# Patient Record
Sex: FEMALE | Race: WHITE | NOT HISPANIC OR LATINO | Employment: OTHER | URBAN - METROPOLITAN AREA
[De-identification: names, ages, dates, MRNs, and addresses within clinical notes are randomized per-mention and may not be internally consistent; named-entity substitution may affect disease eponyms.]

---

## 2017-01-04 ENCOUNTER — TRANSCRIBE ORDERS (OUTPATIENT)
Dept: ADMINISTRATIVE | Facility: HOSPITAL | Age: 67
End: 2017-01-04

## 2017-01-04 DIAGNOSIS — N20.0 CALCULUS OF KIDNEY: Primary | ICD-10-CM

## 2017-01-23 ENCOUNTER — HOSPITAL ENCOUNTER (OUTPATIENT)
Dept: RADIOLOGY | Facility: HOSPITAL | Age: 67
Discharge: HOME | End: 2017-01-23
Attending: UROLOGY
Payer: COMMERCIAL

## 2017-01-23 DIAGNOSIS — N20.0 CALCULUS OF KIDNEY: ICD-10-CM

## 2017-01-23 PROCEDURE — 76770 US EXAM ABDO BACK WALL COMP: CPT

## 2017-09-04 ENCOUNTER — APPOINTMENT (EMERGENCY)
Dept: RADIOLOGY | Facility: HOSPITAL | Age: 67
End: 2017-09-04
Payer: MEDICARE

## 2017-09-04 ENCOUNTER — HOSPITAL ENCOUNTER (EMERGENCY)
Facility: HOSPITAL | Age: 67
Discharge: HOME | End: 2017-09-04
Attending: EMERGENCY MEDICINE
Payer: MEDICARE

## 2017-09-04 VITALS
DIASTOLIC BLOOD PRESSURE: 77 MMHG | HEIGHT: 62 IN | WEIGHT: 255 LBS | TEMPERATURE: 98.7 F | RESPIRATION RATE: 20 BRPM | BODY MASS INDEX: 46.93 KG/M2 | OXYGEN SATURATION: 95 % | HEART RATE: 102 BPM | SYSTOLIC BLOOD PRESSURE: 163 MMHG

## 2017-09-04 DIAGNOSIS — M54.50 LOWER BACK PAIN: Primary | ICD-10-CM

## 2017-09-04 PROCEDURE — 99284 EMERGENCY DEPT VISIT MOD MDM: CPT

## 2017-09-04 PROCEDURE — 72131 CT LUMBAR SPINE W/O DYE: CPT

## 2017-09-04 RX ORDER — OXYCODONE HYDROCHLORIDE AND ACETAMINOPHEN 5; 325 MG/1; MG/1
1 TABLET ORAL EVERY 6 HOURS PRN
Qty: 10 TABLET | Refills: 0 | Status: SHIPPED | OUTPATIENT
Start: 2017-09-04 | End: 2017-09-07

## 2017-09-04 RX ORDER — ONDANSETRON 4 MG/1
4 TABLET, ORALLY DISINTEGRATING ORAL EVERY 8 HOURS PRN
Qty: 10 TABLET | Refills: 0 | Status: SHIPPED | OUTPATIENT
Start: 2017-09-04 | End: 2018-03-08 | Stop reason: ALTCHOICE

## 2017-09-04 RX ORDER — OXYCODONE HYDROCHLORIDE AND ACETAMINOPHEN 5; 325 MG/1; MG/1
2 TABLET ORAL ONCE
Status: COMPLETED | OUTPATIENT
Start: 2017-09-04 | End: 2017-09-04

## 2017-09-04 RX ORDER — ONDANSETRON 4 MG/1
4 TABLET, ORALLY DISINTEGRATING ORAL ONCE
Status: COMPLETED | OUTPATIENT
Start: 2017-09-04 | End: 2017-09-04

## 2017-09-04 RX ADMIN — OXYCODONE HYDROCHLORIDE AND ACETAMINOPHEN 2 TABLET: 5; 325 TABLET ORAL at 13:34

## 2017-09-04 RX ADMIN — ONDANSETRON 4 MG: 4 TABLET, ORALLY DISINTEGRATING ORAL at 14:52

## 2017-09-07 ENCOUNTER — GENERIC CONVERSION - ENCOUNTER (OUTPATIENT)
Dept: OTHER | Facility: OTHER | Age: 67
End: 2017-09-07

## 2017-09-07 ENCOUNTER — ALLSCRIPTS OFFICE VISIT (OUTPATIENT)
Dept: OTHER | Facility: OTHER | Age: 67
End: 2017-09-07

## 2017-09-07 DIAGNOSIS — M54.9 DORSALGIA: ICD-10-CM

## 2017-09-21 ENCOUNTER — APPOINTMENT (OUTPATIENT)
Dept: PHYSICAL THERAPY | Facility: CLINIC | Age: 67
End: 2017-09-21
Payer: MEDICARE

## 2017-09-21 ENCOUNTER — GENERIC CONVERSION - ENCOUNTER (OUTPATIENT)
Dept: OTHER | Facility: OTHER | Age: 67
End: 2017-09-21

## 2017-09-21 DIAGNOSIS — M54.9 DORSALGIA: ICD-10-CM

## 2017-09-21 PROCEDURE — G8978 MOBILITY CURRENT STATUS: HCPCS

## 2017-09-21 PROCEDURE — 97162 PT EVAL MOD COMPLEX 30 MIN: CPT

## 2017-09-21 PROCEDURE — G8979 MOBILITY GOAL STATUS: HCPCS

## 2017-09-27 ENCOUNTER — APPOINTMENT (OUTPATIENT)
Dept: PHYSICAL THERAPY | Facility: CLINIC | Age: 67
End: 2017-09-27
Payer: MEDICARE

## 2017-10-03 ENCOUNTER — APPOINTMENT (OUTPATIENT)
Dept: PHYSICAL THERAPY | Facility: CLINIC | Age: 67
End: 2017-10-03
Payer: MEDICARE

## 2017-10-03 PROCEDURE — 97110 THERAPEUTIC EXERCISES: CPT

## 2017-10-03 PROCEDURE — 97140 MANUAL THERAPY 1/> REGIONS: CPT

## 2017-10-05 ENCOUNTER — APPOINTMENT (OUTPATIENT)
Dept: PHYSICAL THERAPY | Facility: CLINIC | Age: 67
End: 2017-10-05
Payer: MEDICARE

## 2017-10-05 PROCEDURE — 97140 MANUAL THERAPY 1/> REGIONS: CPT

## 2017-10-05 PROCEDURE — 97110 THERAPEUTIC EXERCISES: CPT

## 2017-10-10 ENCOUNTER — APPOINTMENT (OUTPATIENT)
Dept: PHYSICAL THERAPY | Facility: CLINIC | Age: 67
End: 2017-10-10
Payer: MEDICARE

## 2017-10-12 ENCOUNTER — APPOINTMENT (OUTPATIENT)
Dept: PHYSICAL THERAPY | Facility: CLINIC | Age: 67
End: 2017-10-12
Payer: MEDICARE

## 2017-10-17 ENCOUNTER — APPOINTMENT (OUTPATIENT)
Dept: PHYSICAL THERAPY | Facility: CLINIC | Age: 67
End: 2017-10-17
Payer: MEDICARE

## 2017-10-17 PROCEDURE — 97110 THERAPEUTIC EXERCISES: CPT

## 2017-10-17 PROCEDURE — 97140 MANUAL THERAPY 1/> REGIONS: CPT

## 2017-10-19 ENCOUNTER — APPOINTMENT (OUTPATIENT)
Dept: PHYSICAL THERAPY | Facility: CLINIC | Age: 67
End: 2017-10-19
Payer: MEDICARE

## 2017-10-19 PROCEDURE — G8979 MOBILITY GOAL STATUS: HCPCS

## 2017-10-19 PROCEDURE — G8980 MOBILITY D/C STATUS: HCPCS | Performed by: PHYSICAL THERAPIST

## 2017-10-19 PROCEDURE — 97110 THERAPEUTIC EXERCISES: CPT

## 2017-10-19 PROCEDURE — 97140 MANUAL THERAPY 1/> REGIONS: CPT

## 2017-10-24 ENCOUNTER — APPOINTMENT (OUTPATIENT)
Dept: PHYSICAL THERAPY | Facility: CLINIC | Age: 67
End: 2017-10-24
Payer: MEDICARE

## 2017-10-26 ENCOUNTER — APPOINTMENT (OUTPATIENT)
Dept: PHYSICAL THERAPY | Facility: CLINIC | Age: 67
End: 2017-10-26
Payer: MEDICARE

## 2017-11-17 ENCOUNTER — TRANSCRIBE ORDERS (OUTPATIENT)
Dept: ADMINISTRATIVE | Facility: HOSPITAL | Age: 67
End: 2017-11-17

## 2017-11-17 DIAGNOSIS — Z87.442 PERSONAL HISTORY OF URINARY CALCULI: Primary | ICD-10-CM

## 2017-11-20 ENCOUNTER — HOSPITAL ENCOUNTER (OUTPATIENT)
Dept: RADIOLOGY | Facility: HOSPITAL | Age: 67
Discharge: HOME | End: 2017-11-20
Attending: UROLOGY
Payer: MEDICARE

## 2017-11-20 DIAGNOSIS — Z87.442 PERSONAL HISTORY OF URINARY CALCULI: ICD-10-CM

## 2017-11-20 PROCEDURE — 76770 US EXAM ABDO BACK WALL COMP: CPT

## 2018-01-22 VITALS — WEIGHT: 260 LBS | HEIGHT: 62 IN | BODY MASS INDEX: 47.84 KG/M2

## 2018-03-08 ENCOUNTER — APPOINTMENT (INPATIENT)
Dept: RADIOLOGY | Facility: HOSPITAL | Age: 68
DRG: 865 | End: 2018-03-08
Payer: MEDICARE

## 2018-03-08 ENCOUNTER — HOSPITAL ENCOUNTER (INPATIENT)
Facility: HOSPITAL | Age: 68
LOS: 4 days | Discharge: HOME | DRG: 865 | End: 2018-03-12
Attending: INTERNAL MEDICINE | Admitting: INTERNAL MEDICINE
Payer: MEDICARE

## 2018-03-08 DIAGNOSIS — B02.9 HERPES ZOSTER: ICD-10-CM

## 2018-03-08 DIAGNOSIS — E87.5 HYPERKALEMIA: ICD-10-CM

## 2018-03-08 DIAGNOSIS — B02.9 HERPES ZOSTER WITHOUT COMPLICATION: ICD-10-CM

## 2018-03-08 DIAGNOSIS — I47.1 SVT (SUPRAVENTRICULAR TACHYCARDIA) (HCC): ICD-10-CM

## 2018-03-08 DIAGNOSIS — R00.2 PALPITATIONS: ICD-10-CM

## 2018-03-08 DIAGNOSIS — R52 INTRACTABLE PAIN: ICD-10-CM

## 2018-03-08 DIAGNOSIS — R00.0 TACHYCARDIA: ICD-10-CM

## 2018-03-08 DIAGNOSIS — B02.9 SHINGLES: Primary | ICD-10-CM

## 2018-03-08 DIAGNOSIS — R50.9 FEVER: ICD-10-CM

## 2018-03-08 PROBLEM — J45.909 ASTHMA: Status: ACTIVE | Noted: 2018-03-08

## 2018-03-08 PROBLEM — IMO0001 UNCONTROLLED DIABETES MELLITUS TYPE 2 WITHOUT COMPLICATIONS: Status: ACTIVE | Noted: 2018-03-08

## 2018-03-08 PROBLEM — I47.10 SVT (SUPRAVENTRICULAR TACHYCARDIA): Status: ACTIVE | Noted: 2018-03-08

## 2018-03-08 PROBLEM — E66.01 MORBID OBESITY WITH BMI OF 45.0-49.9, ADULT (HCC): Status: ACTIVE | Noted: 2018-03-08

## 2018-03-08 LAB
ALBUMIN SERPL BCP-MCNC: 3.7 G/DL (ref 3.5–5)
ALP SERPL-CCNC: 64 U/L (ref 46–116)
ALT SERPL W P-5'-P-CCNC: 26 U/L (ref 12–78)
ANION GAP SERPL CALCULATED.3IONS-SCNC: 10 MMOL/L (ref 4–13)
AST SERPL W P-5'-P-CCNC: 25 U/L (ref 5–45)
BASOPHILS # BLD AUTO: 0.1 THOUSANDS/ΜL (ref 0–0.1)
BASOPHILS NFR BLD AUTO: 1 % (ref 0–1)
BILIRUB SERPL-MCNC: 0.4 MG/DL (ref 0.2–1)
BUN SERPL-MCNC: 24 MG/DL (ref 5–25)
CALCIUM SERPL-MCNC: 9.8 MG/DL (ref 8.3–10.1)
CHLORIDE SERPL-SCNC: 98 MMOL/L (ref 100–108)
CK MB SERPL-MCNC: 1.1 % (ref 0–2.5)
CK MB SERPL-MCNC: 1.9 NG/ML (ref 0–5)
CK SERPL-CCNC: 172 U/L (ref 26–192)
CO2 SERPL-SCNC: 28 MMOL/L (ref 21–32)
CREAT SERPL-MCNC: 1.22 MG/DL (ref 0.6–1.3)
EOSINOPHIL # BLD AUTO: 0.1 THOUSAND/ΜL (ref 0–0.61)
EOSINOPHIL NFR BLD AUTO: 1 % (ref 0–6)
ERYTHROCYTE [DISTWIDTH] IN BLOOD BY AUTOMATED COUNT: 13.8 % (ref 11.6–15.1)
GFR SERPL CREATININE-BSD FRML MDRD: 47 ML/MIN/1.73SQ M
GLUCOSE SERPL-MCNC: 199 MG/DL (ref 65–140)
GLUCOSE SERPL-MCNC: 240 MG/DL (ref 65–140)
GLUCOSE SERPL-MCNC: 249 MG/DL (ref 65–140)
HCT VFR BLD AUTO: 44.5 % (ref 37–47)
HGB BLD-MCNC: 14.8 G/DL (ref 12–16)
LYMPHOCYTES # BLD AUTO: 1.6 THOUSANDS/ΜL (ref 0.6–4.47)
LYMPHOCYTES NFR BLD AUTO: 19 % (ref 14–44)
MAGNESIUM SERPL-MCNC: 1.6 MG/DL (ref 1.6–2.6)
MCH RBC QN AUTO: 28.8 PG (ref 27–31)
MCHC RBC AUTO-ENTMCNC: 33.2 G/DL (ref 31.4–37.4)
MCV RBC AUTO: 87 FL (ref 82–98)
MONOCYTES # BLD AUTO: 0.8 THOUSAND/ΜL (ref 0.17–1.22)
MONOCYTES NFR BLD AUTO: 9 % (ref 4–12)
NEUTROPHILS # BLD AUTO: 6.1 THOUSANDS/ΜL (ref 1.85–7.62)
NEUTS SEG NFR BLD AUTO: 70 % (ref 43–75)
NRBC BLD AUTO-RTO: 0 /100 WBCS
PLATELET # BLD AUTO: 230 THOUSANDS/UL (ref 130–400)
PMV BLD AUTO: 7.9 FL (ref 8.9–12.7)
POTASSIUM SERPL-SCNC: 4.5 MMOL/L (ref 3.5–5.3)
PROT SERPL-MCNC: 8 G/DL (ref 6.4–8.2)
RBC # BLD AUTO: 5.12 MILLION/UL (ref 4.2–5.4)
SODIUM SERPL-SCNC: 136 MMOL/L (ref 136–145)
TROPONIN I SERPL-MCNC: <0.02 NG/ML
TSH SERPL DL<=0.05 MIU/L-ACNC: 3.04 UIU/ML (ref 0.36–3.74)
WBC # BLD AUTO: 8.7 THOUSAND/UL (ref 4.8–10.8)

## 2018-03-08 PROCEDURE — 96375 TX/PRO/DX INJ NEW DRUG ADDON: CPT

## 2018-03-08 PROCEDURE — 82948 REAGENT STRIP/BLOOD GLUCOSE: CPT

## 2018-03-08 PROCEDURE — 96365 THER/PROPH/DIAG IV INF INIT: CPT

## 2018-03-08 PROCEDURE — 83735 ASSAY OF MAGNESIUM: CPT | Performed by: PHYSICIAN ASSISTANT

## 2018-03-08 PROCEDURE — 87040 BLOOD CULTURE FOR BACTERIA: CPT | Performed by: PHYSICIAN ASSISTANT

## 2018-03-08 PROCEDURE — 82550 ASSAY OF CK (CPK): CPT | Performed by: PHYSICIAN ASSISTANT

## 2018-03-08 PROCEDURE — 99223 1ST HOSP IP/OBS HIGH 75: CPT | Performed by: INTERNAL MEDICINE

## 2018-03-08 PROCEDURE — 80053 COMPREHEN METABOLIC PANEL: CPT | Performed by: PHYSICIAN ASSISTANT

## 2018-03-08 PROCEDURE — 36415 COLL VENOUS BLD VENIPUNCTURE: CPT | Performed by: PHYSICIAN ASSISTANT

## 2018-03-08 PROCEDURE — 96361 HYDRATE IV INFUSION ADD-ON: CPT

## 2018-03-08 PROCEDURE — 84443 ASSAY THYROID STIM HORMONE: CPT | Performed by: STUDENT IN AN ORGANIZED HEALTH CARE EDUCATION/TRAINING PROGRAM

## 2018-03-08 PROCEDURE — 85025 COMPLETE CBC W/AUTO DIFF WBC: CPT | Performed by: PHYSICIAN ASSISTANT

## 2018-03-08 PROCEDURE — 84484 ASSAY OF TROPONIN QUANT: CPT | Performed by: PHYSICIAN ASSISTANT

## 2018-03-08 PROCEDURE — 71045 X-RAY EXAM CHEST 1 VIEW: CPT

## 2018-03-08 PROCEDURE — 87081 CULTURE SCREEN ONLY: CPT | Performed by: INTERNAL MEDICINE

## 2018-03-08 PROCEDURE — 82553 CREATINE MB FRACTION: CPT | Performed by: PHYSICIAN ASSISTANT

## 2018-03-08 PROCEDURE — 99223 1ST HOSP IP/OBS HIGH 75: CPT | Performed by: STUDENT IN AN ORGANIZED HEALTH CARE EDUCATION/TRAINING PROGRAM

## 2018-03-08 PROCEDURE — 99284 EMERGENCY DEPT VISIT MOD MDM: CPT

## 2018-03-08 PROCEDURE — 93005 ELECTROCARDIOGRAM TRACING: CPT | Performed by: PHYSICIAN ASSISTANT

## 2018-03-08 RX ORDER — FAMOTIDINE 20 MG/1
20 TABLET, FILM COATED ORAL DAILY
Status: DISCONTINUED | OUTPATIENT
Start: 2018-03-09 | End: 2018-03-12 | Stop reason: HOSPADM

## 2018-03-08 RX ORDER — RANITIDINE 150 MG/1
150 TABLET ORAL 2 TIMES DAILY PRN
Status: ON HOLD | COMMUNITY
End: 2019-08-01 | Stop reason: SDUPTHER

## 2018-03-08 RX ORDER — ACETAMINOPHEN 325 MG/1
650 TABLET ORAL EVERY 6 HOURS PRN
Status: DISCONTINUED | OUTPATIENT
Start: 2018-03-08 | End: 2018-03-12 | Stop reason: HOSPADM

## 2018-03-08 RX ORDER — ONDANSETRON 2 MG/ML
4 INJECTION INTRAMUSCULAR; INTRAVENOUS ONCE
Status: COMPLETED | OUTPATIENT
Start: 2018-03-08 | End: 2018-03-08

## 2018-03-08 RX ORDER — ALBUTEROL SULFATE 90 UG/1
2 AEROSOL, METERED RESPIRATORY (INHALATION) EVERY 6 HOURS PRN
Status: DISCONTINUED | OUTPATIENT
Start: 2018-03-08 | End: 2018-03-12 | Stop reason: HOSPADM

## 2018-03-08 RX ORDER — ROSUVASTATIN CALCIUM 20 MG/1
20 TABLET, COATED ORAL DAILY
COMMUNITY
End: 2019-09-10 | Stop reason: ALTCHOICE

## 2018-03-08 RX ORDER — ASPIRIN 81 MG/1
81 TABLET ORAL DAILY
Status: DISCONTINUED | OUTPATIENT
Start: 2018-03-09 | End: 2018-03-12 | Stop reason: HOSPADM

## 2018-03-08 RX ORDER — TORSEMIDE 10 MG/1
10 TABLET ORAL DAILY
Status: DISCONTINUED | OUTPATIENT
Start: 2018-03-09 | End: 2018-03-12 | Stop reason: HOSPADM

## 2018-03-08 RX ORDER — ONDANSETRON 2 MG/ML
4 INJECTION INTRAMUSCULAR; INTRAVENOUS EVERY 6 HOURS PRN
Status: DISCONTINUED | OUTPATIENT
Start: 2018-03-08 | End: 2018-03-12 | Stop reason: HOSPADM

## 2018-03-08 RX ORDER — LISINOPRIL 10 MG/1
10 TABLET ORAL DAILY
Status: DISCONTINUED | OUTPATIENT
Start: 2018-03-09 | End: 2018-03-12 | Stop reason: HOSPADM

## 2018-03-08 RX ORDER — ATORVASTATIN CALCIUM 40 MG/1
40 TABLET, FILM COATED ORAL
Status: DISCONTINUED | OUTPATIENT
Start: 2018-03-08 | End: 2018-03-12 | Stop reason: HOSPADM

## 2018-03-08 RX ORDER — ASPIRIN 81 MG/1
81 TABLET ORAL DAILY
COMMUNITY
End: 2019-05-30 | Stop reason: HOSPADM

## 2018-03-08 RX ORDER — MORPHINE SULFATE 4 MG/ML
4 INJECTION, SOLUTION INTRAMUSCULAR; INTRAVENOUS ONCE
Status: COMPLETED | OUTPATIENT
Start: 2018-03-08 | End: 2018-03-08

## 2018-03-08 RX ORDER — MONTELUKAST SODIUM 10 MG/1
10 TABLET ORAL DAILY
COMMUNITY

## 2018-03-08 RX ORDER — LIDOCAINE 50 MG/G
2 PATCH TOPICAL DAILY
Status: DISCONTINUED | OUTPATIENT
Start: 2018-03-08 | End: 2018-03-11

## 2018-03-08 RX ORDER — MONTELUKAST SODIUM 10 MG/1
10 TABLET ORAL
Status: DISCONTINUED | OUTPATIENT
Start: 2018-03-08 | End: 2018-03-12 | Stop reason: HOSPADM

## 2018-03-08 RX ORDER — LISINOPRIL 10 MG/1
10 TABLET ORAL DAILY
COMMUNITY
End: 2019-08-01 | Stop reason: HOSPADM

## 2018-03-08 RX ORDER — ALBUTEROL SULFATE 90 UG/1
2 AEROSOL, METERED RESPIRATORY (INHALATION) EVERY 6 HOURS PRN
COMMUNITY
End: 2022-03-25 | Stop reason: SDUPTHER

## 2018-03-08 RX ORDER — GABAPENTIN 100 MG/1
100 CAPSULE ORAL 3 TIMES DAILY
Status: DISCONTINUED | OUTPATIENT
Start: 2018-03-08 | End: 2018-03-12 | Stop reason: HOSPADM

## 2018-03-08 RX ORDER — TORSEMIDE 10 MG/1
10 TABLET ORAL DAILY
Status: ON HOLD | COMMUNITY
End: 2019-05-30 | Stop reason: SDUPTHER

## 2018-03-08 RX ORDER — INSULIN GLARGINE 100 [IU]/ML
40 INJECTION, SOLUTION SUBCUTANEOUS EVERY 12 HOURS SCHEDULED
Status: DISCONTINUED | OUTPATIENT
Start: 2018-03-08 | End: 2018-03-12 | Stop reason: HOSPADM

## 2018-03-08 RX ADMIN — SODIUM CHLORIDE 1000 ML: 0.9 INJECTION, SOLUTION INTRAVENOUS at 12:32

## 2018-03-08 RX ADMIN — MORPHINE SULFATE 4 MG: 4 INJECTION, SOLUTION INTRAMUSCULAR; INTRAVENOUS at 12:36

## 2018-03-08 RX ADMIN — GABAPENTIN 100 MG: 100 CAPSULE ORAL at 22:53

## 2018-03-08 RX ADMIN — LIDOCAINE 2 PATCH: 50 PATCH TOPICAL at 18:23

## 2018-03-08 RX ADMIN — FLUTICASONE PROPIONATE AND SALMETEROL 2 PUFF: 50; 100 POWDER RESPIRATORY (INHALATION) at 22:53

## 2018-03-08 RX ADMIN — ACYCLOVIR SODIUM 500 MG: 50 INJECTION, SOLUTION INTRAVENOUS at 22:54

## 2018-03-08 RX ADMIN — GABAPENTIN 100 MG: 100 CAPSULE ORAL at 18:23

## 2018-03-08 RX ADMIN — ONDANSETRON 4 MG: 2 INJECTION INTRAMUSCULAR; INTRAVENOUS at 14:39

## 2018-03-08 RX ADMIN — INSULIN LISPRO 2 UNITS: 100 INJECTION, SOLUTION INTRAVENOUS; SUBCUTANEOUS at 18:24

## 2018-03-08 RX ADMIN — INSULIN GLARGINE 40 UNITS: 100 INJECTION, SOLUTION SUBCUTANEOUS at 22:53

## 2018-03-08 RX ADMIN — ACYCLOVIR SODIUM 500 MG: 50 INJECTION, SOLUTION INTRAVENOUS at 13:14

## 2018-03-08 RX ADMIN — ATORVASTATIN CALCIUM 40 MG: 40 TABLET, FILM COATED ORAL at 18:22

## 2018-03-08 RX ADMIN — INSULIN LISPRO 2 UNITS: 100 INJECTION, SOLUTION INTRAVENOUS; SUBCUTANEOUS at 22:54

## 2018-03-08 NOTE — H&P
H&P- Santiago Medley 1950, 74 y o  female MRN: [id number]    Unit/Bed#: ED 12 Encounter: [id number]    Primary Care Provider: Jade Smith   Date and time admitted to hospital: 3/8/2018 11:34 AM        Morbid obesity with BMI of 45 0-49 9, adult ([facility] )   Assessment & Plan    -advised diet with loss  -Nutrition consult        Uncontrolled diabetes mellitus type 2 without complications ([facility] )   Assessment & Plan    -check HA1c (last one was 9 0 in 2016)  -cont with patients home glargine, place on ISS  -diabetic diet        Asthma   Assessment & Plan    -no exacerbation  -cont with home meds        Intractable pain   Assessment & Plan    -2/2 shingles  -treat as noted        Shingles outbreak   Assessment & Plan    -painful  -Patients stated on acyclovir in the ED  -will add Neurontin and lidocaine patch        * SVT (supraventricular tachycardia) (HCC)   Assessment & Plan    -noted on tele  -EKG showing sinus tachy at 120  -patient with hx palpitations, was supposed to see cardio for holter monitor but has not had it done yet  -consult cardio for possible Holter now  -place on tele  -Check TSH  -treat pain from shingles            VTE Prophylaxis: Enoxaparin (Lovenox)  / sequential compression device   Code Status: Prior    Anticipated Length of Stay:  Patient will be admitted on an Inpatient basis with an anticipated length of stay of  > 2 midnights  Justification for Hospital Stay:  Life-threatening arrhythmia    Total Time for Visit, including Counseling / Coordination of Care: 45 minutes  Greater than 50% of this total time spent on direct patient counseling and coordination of care  Chief Complaint:   Rash (Pt sts rash for 2 days under left breast and around to back  Red, raised rash under left breast radiating around to back    Also c/o headache and nausea)      History of Present Illness:    Santiago Medley is a 74 y o  female with a PMH of asthma, type 2 diabetes on long-term insulin, hyperlipidemia, morbid obesity, and undiagnosed palpitations who presents with severe burning pain over the left breast fold that began several days ago and has worsened to the point that the pain is now 10/10  It is associated with pruritus, generalized feeling of being unwell, body aches, headache  She thinks it is shingles and has not had her shingles vaccine  She is also having palpitations which have been ongoing for several months  She seen her PCP and was scheduled to have a Holter monitor placed however she has not had it done yet  She has never had a cardiologist   Currently she denies any chest pain  She has some nausea but no emesis  She denies shortness of breath or lightheadedness  She denies abdominal pain or urinary symptoms  She denies vision changes numbness or focal weakness  In the ED she was noted to be in sinus tachy in the low 100  She then was noted to have a sudden run of a short SVT with heart rate increasing to 160s and then spontaneously converting back to the low 100s  Review of Systems:    Review of Systems   Constitutional: Negative for chills, fatigue, fever and unexpected weight change  HENT: Negative  Eyes: Negative  Respiratory: Negative for cough, chest tightness, shortness of breath and wheezing  Cardiovascular: Positive for palpitations and leg swelling (Chronic)  Negative for chest pain  Gastrointestinal: Positive for nausea  Negative for abdominal pain, constipation, diarrhea and vomiting  Genitourinary: Negative for decreased urine volume, dysuria, frequency, hematuria and urgency  Musculoskeletal: Positive for back pain, myalgias and neck pain  Skin: Positive for rash  Neurological: Positive for weakness and headaches  Negative for dizziness, syncope and light-headedness  Hematological: Bruises/bleeds easily  Psychiatric/Behavioral: Negative for behavioral problems and confusion  The patient is not nervous/anxious          Past Medical and Surgical History:     Past Medical History:   Diagnosis Date    Asthma     Diabetes mellitus ([address] )     GERD (gastroesophageal reflux disease)     Hyperlipidemia     Hypertension     Kidney stones     PONV (postoperative nausea and vomiting)        Past Surgical History:   Procedure Laterality Date    APPENDECTOMY      CYSTOSCOPY W/ LASER LITHOTRIPSY Left 7/12/2016    Procedure: CYSTOSCOPY URETEROSCOPY WITH LITHOTRIPSY HOLMIUM LASER, RETROGRADE PYELOGRAM AND INSERTION STENT URETERAL;  Surgeon: Maria R العراقي MD;  Location: [address] [place] [address];  Service:     DILATION AND CURETTAGE OF UTERUS      JOINT REPLACEMENT      right knee    KNEE ARTHROPLASTY Right     [place] CYSTOURETHROSCOPY,URETER CATHETER Left 6/29/2016    Procedure: CYSTOSCOPY RETROGRADE PYELOGRAM WITH INSERTION STENT URETERAL, left;  Surgeon: Maria R العراقي MD;  Location: [facility];  Service: Urology    SHOULDER ARTHROTOMY Left        Meds/Allergies:    Prior to Admission medications    Medication Sig Start Date End Date Taking?  Authorizing Provider   albuterol (PROVENTIL HFA,VENTOLIN HFA) 90 mcg/act inhaler Inhale 2 puffs every 6 (six) hours as needed for wheezing   Yes Historical Provider, MD   aspirin (ECOTRIN LOW STRENGTH) 81 mg EC tablet Take 81 mg by mouth daily   Yes Historical Provider, MD   Dulaglutide (TRULICITY) 1 5 CO/3 7IH SOPN Inject 1 5 mg under the skin once a week   Yes Historical Provider, MD   Insulin Glargine (TOUJEO SOLOSTAR) injection pen 300 units/mL Inject 40 Units under the skin 2 (two) times a day   Yes Historical Provider, MD   insulin lispro (HumaLOG) 100 units/mL injection Inject under the skin 3 (three) times a day before meals   Yes Historical Provider, MD   lisinopril (ZESTRIL) 10 mg tablet Take 10 mg by mouth daily   Yes Historical Provider, MD   Mometasone Furo-Formoterol Fum (DULERA) 100-5 MCG/ACT AERO Inhale 2 puffs 2 (two) times a day   Yes Historical Provider, MD   montelukast (SINGULAIR) 10 mg tablet Take 10 mg by mouth daily at bedtime   Yes Historical Provider, MD   ranitidine (ZANTAC) 150 mg tablet Take 150 mg by mouth 2 (two) times a day   Yes Historical Provider, MD   rosuvastatin (CRESTOR) 20 MG tablet Take 20 mg by mouth daily   Yes Historical Provider, MD   torsemide (DEMADEX) 10 mg tablet Take 10 mg by mouth daily   Yes Historical Provider, MD   Canagliflozin-Metformin HCl (INVOKAMET)  MG TABS Take 1 tablet by mouth 2 (two) times a day  3/8/18  Historical Provider, MD   desloratadine (CLARINEX) 5 MG tablet Take 5 mg by mouth daily  3/8/18  Historical Provider, MD   esomeprazole (NexIUM) 40 MG capsule Take 40 mg by mouth every morning before breakfast   3/8/18  Historical Provider, MD   ezetimibe-simvastatin (VYTORIN) 10-40 mg per tablet Take 1 tablet by mouth daily at bedtime  3/8/18  Historical Provider, MD   fosinopril-hydrochlorothiazide (MONOPRIL-HCT) 10-12 5 MG per tablet Take 1 tablet by mouth daily  3/8/18  Historical Provider, MD   insulin glargine (LANTUS) 100 units/mL subcutaneous injection Inject 34 Units under the skin 2 (two) times a day  3/8/18  Historical Provider, MD   ondansetron (ZOFRAN-ODT) 4 mg disintegrating tablet Take 1 tablet by mouth every 8 (eight) hours as needed for nausea or vomiting for up to 10 doses 9/4/17 3/8/18  Derian Chavez MD       Allergies:    Allergies   Allergen Reactions    Asa [Aspirin] GI Intolerance    Indocin [Indomethacin] Other (See Comments)     Made patient "loopy"    Penicillins Hives       Social History:     Marital Status: Single   Substance Use History:   History   Alcohol Use    Yes     Comment: rarely     History   Smoking Status    Former Smoker    Packs/day: 1 00    Years: 20 00    Types: Cigarettes    Quit date: 7/8/1996   Smokeless Tobacco    Never Used     History   Drug Use No       Family History:    non-contributory    Physical Exam:     Vitals:   Blood Pressure: 138/70 (03/08/18 1443)  Pulse: 97 (03/08/18 1445)  Temperature: 98 6 °F (37 °C) (03/08/18 1142)  Respirations: 17 (03/08/18 1445)  Height: 5' 2" (157 5 cm) (03/08/18 1142)  Weight - Scale: 116 kg (256 lb) (03/08/18 1142)  SpO2: 92 % (03/08/18 1445)    Physical Exam   Constitutional: She is oriented to person, place, and time  She appears well-developed  No distress  Chronically ill-appearing, morbidly obese   HENT:   Head: Normocephalic and atraumatic  Mouth/Throat: Oropharynx is clear and moist    Neck: Neck supple  No JVD present  Cardiovascular: Regular rhythm and normal heart sounds  No murmur heard  Sinus tachycardia   Pulmonary/Chest: Effort normal and breath sounds normal  No respiratory distress  She has no wheezes  She has no rales  She exhibits no tenderness  Abdominal: Soft  Bowel sounds are normal  She exhibits no distension  There is no tenderness  There is no rebound and no guarding  Musculoskeletal: She exhibits edema (Trace pitting)  She exhibits no tenderness or deformity  Neurological: She is alert and oriented to person, place, and time  Skin: Skin is warm and dry  Rash noted  Rash is vesicular (Rash over the left breast fold in a dermatomal pattern)  Psychiatric: She has a normal mood and affect  Her behavior is normal    Nursing note and vitals reviewed  Additional Data:     Lab Results: I have personally reviewed pertinent reports          Results from last 7 days  Lab Units 03/08/18  1231   WBC Thousand/uL 8 70   HEMOGLOBIN g/dL 14 8   HEMATOCRIT % 44 5   PLATELETS Thousands/uL 230   NEUTROS PCT % 70   LYMPHS PCT % 19   MONOS PCT % 9   EOS PCT % 1       Results from last 7 days  Lab Units 03/08/18  1231   SODIUM mmol/L 136   POTASSIUM mmol/L 4 5   CHLORIDE mmol/L 98*   CO2 mmol/L 28   BUN mg/dL 24   CREATININE mg/dL 1 22   CALCIUM mg/dL 9 8   TOTAL PROTEIN g/dL 8 0   BILIRUBIN TOTAL mg/dL 0 40   ALK PHOS U/L 64   ALT U/L 26   AST U/L 25   GLUCOSE RANDOM mg/dL 240*           Imaging: I have personally reviewed pertinent reports  XR chest 1 view portable   Final Result by Laz Guillen MD (03/08 1506)      No acute cardiopulmonary disease  Workstation performed: [id number]             XR chest 1 view portable   Final Result      No acute cardiopulmonary disease  Workstation performed: [id number]             EKG, Pathology, and Other Studies Reviewed on Admission:   · EKG: sinus tachy at 120 bpm    Allscripts / Epic Records Reviewed: Yes     ** Please Note: This note has been constructed using a voice recognition system   **

## 2018-03-08 NOTE — ED PROVIDER NOTES
History  Chief Complaint   Patient presents with    Rash     Pt sts rash for 2 days under left breast and around to back  Red, raised rash under left breast radiating around to back  Also c/o headache and nausea     60-year-old female presenting today with a rash that started underneath the left breast then and now spread to the back that is very painful and pruritic  She did not get her shingles vaccination  Also notes that she has had chills feels generally unwell including diffuse body aches as well as a headache  She has not taken any indication for pain  Relays that she was supposed to get a Holter monitor as she has that episodes of palpitations and lightheadedness  States that she has not felt like this over the past few days however does present with a heart rate of 132  Denies chest pain, nausea, vomiting, shortness of breath, wheezing, abdominal pain, weakness, numbness, paresthesias, changes in vision  Prior to Admission Medications   Prescriptions Last Dose Informant Patient Reported? Taking?    Dulaglutide (TRULICITY) 1 5 SULLIVAN/3 1DU SOPN   Yes Yes   Sig: Inject 1 5 mg under the skin once a week   Insulin Glargine (TOUJEO SOLOSTAR) injection pen 300 units/mL   Yes Yes   Sig: Inject 40 Units under the skin 2 (two) times a day   Mometasone Furo-Formoterol Fum (DULERA) 100-5 MCG/ACT AERO   Yes Yes   Sig: Inhale 2 puffs 2 (two) times a day   albuterol (PROVENTIL HFA,VENTOLIN HFA) 90 mcg/act inhaler   Yes Yes   Sig: Inhale 2 puffs every 6 (six) hours as needed for wheezing   aspirin (ECOTRIN LOW STRENGTH) 81 mg EC tablet   Yes Yes   Sig: Take 81 mg by mouth daily   insulin lispro (HumaLOG) 100 units/mL injection   Yes Yes   Sig: Inject under the skin 3 (three) times a day before meals   lisinopril (ZESTRIL) 10 mg tablet   Yes Yes   Sig: Take 10 mg by mouth daily   montelukast (SINGULAIR) 10 mg tablet   Yes Yes   Sig: Take 10 mg by mouth daily at bedtime   ranitidine (ZANTAC) 150 mg tablet Yes Yes   Sig: Take 150 mg by mouth 2 (two) times a day   rosuvastatin (CRESTOR) 20 MG tablet   Yes Yes   Sig: Take 20 mg by mouth daily   torsemide (DEMADEX) 10 mg tablet   Yes Yes   Sig: Take 10 mg by mouth daily      Facility-Administered Medications: None       Past Medical History:   Diagnosis Date    Asthma     Diabetes mellitus ([facility] )     GERD (gastroesophageal reflux disease)     Hyperlipidemia     Hypertension     Kidney stones     PONV (postoperative nausea and vomiting)        Past Surgical History:   Procedure Laterality Date    APPENDECTOMY      CYSTOSCOPY W/ LASER LITHOTRIPSY Left 7/12/2016    Procedure: CYSTOSCOPY URETEROSCOPY WITH LITHOTRIPSY HOLMIUM LASER, RETROGRADE PYELOGRAM AND INSERTION STENT URETERAL;  Surgeon: Mai Simmons MD;  Location: [address] [place] [address];  Service:     DILATION AND CURETTAGE OF UTERUS      JOINT REPLACEMENT      right knee    KNEE ARTHROPLASTY Right     [place] CYSTOURETHROSCOPY,URETER CATHETER Left 6/29/2016    Procedure: CYSTOSCOPY RETROGRADE PYELOGRAM WITH INSERTION STENT URETERAL, left;  Surgeon: Mai Simmons MD;  Location: [facility];  Service: Urology    SHOULDER ARTHROTOMY Left        History reviewed  No pertinent family history  I have reviewed and agree with the history as documented  Social History   Substance Use Topics    Smoking status: Former Smoker     Packs/day: 1 00     Years: 20 00     Types: Cigarettes     Quit date: 7/8/1996    Smokeless tobacco: Never Used    Alcohol use Yes      Comment: rarely        Review of Systems   Constitutional: Positive for chills  Negative for activity change, appetite change, diaphoresis, fatigue, fever and unexpected weight change  HENT: Negative  Eyes: Negative  Respiratory: Negative  Cardiovascular: Negative  Gastrointestinal: Negative  Genitourinary: Negative  Musculoskeletal: Positive for arthralgias, myalgias and neck pain   Negative for back pain, gait problem, joint swelling and neck stiffness  Skin: Positive for rash  Negative for color change, pallor and wound  Neurological: Positive for headaches  Negative for dizziness, tremors, seizures, syncope, facial asymmetry, speech difficulty, weakness, light-headedness and numbness  All other systems reviewed and are negative  Physical Exam  ED Triage Vitals   Temperature Pulse Respirations Blood Pressure SpO2   03/08/18 1142 03/08/18 1142 03/08/18 1142 03/08/18 1142 03/08/18 1142   98 6 °F (37 °C) (!) 132 20 (!) 190/91 98 %      Temp src Heart Rate Source Patient Position - Orthostatic VS BP Location FiO2 (%)   -- -- 03/08/18 1348 03/08/18 1348 --     Lying Right arm       Pain Score       03/08/18 1142       8           Orthostatic Vital Signs  Vitals:    03/08/18 1315 03/08/18 1330 03/08/18 1345 03/08/18 1348   BP:    153/79   Pulse: (!) 114 (!) 115 (!) 116    Patient Position - Orthostatic VS:    Lying       Physical Exam   Constitutional: She is oriented to person, place, and time  She appears well-developed and well-nourished  No distress  HENT:   Head: Normocephalic and atraumatic  Right Ear: External ear normal    Left Ear: External ear normal    Nose: Nose normal    Mouth/Throat: Oropharynx is clear and moist    Eyes: Conjunctivae and EOM are normal  Pupils are equal, round, and reactive to light  Neck: Normal range of motion  Neck supple  Good ROM and strength to neck  Able to touch chin to chest without difficulty  Cardiovascular: Regular rhythm, normal heart sounds and intact distal pulses  Exam reveals no gallop and no friction rub  No murmur heard  Pulmonary/Chest: Effort normal and breath sounds normal    spo2 is 95% indicating adequate oxygenation   Abdominal: Soft  Bowel sounds are normal  She exhibits no distension and no mass  There is no tenderness  There is no rebound and no guarding  No hernia  Neurological: She is alert and oriented to person, place, and time  Skin: Skin is warm and dry  Capillary refill takes less than 2 seconds  Rash noted  No purpura noted  Rash is papular and vesicular  Rash is not macular, not maculopapular, not nodular, not pustular and not urticarial  She is not diaphoretic  No erythema  No pallor  Nursing note and vitals reviewed  ED Medications  Medications   ondansetron (ZOFRAN) injection 4 mg (not administered)   sodium chloride 0 9 % bolus 1,000 mL (1,000 mL Intravenous New Bag 3/8/18 1232)   morphine (PF) 4 mg/mL injection 4 mg (4 mg Intravenous Given 3/8/18 1236)   acyclovir (ZOVIRAX) 500 mg in sodium chloride 0 9 % 100 mL IVPB (500 mg Intravenous New Bag 3/8/18 1314)       Diagnostic Studies  Results Reviewed     Procedure Component Value Units Date/Time    CKMB [[id number]]  (Normal) Collected:  03/08/18 1231    Lab Status:  Final result Specimen:  Blood from Arm, Left Updated:  03/08/18 1426     CK-MB Index 1 1 %      CK-MB FRACTION 1 9 ng/mL     Comprehensive metabolic panel [[id number]]  (Abnormal) Collected:  03/08/18 1231    Lab Status:  Final result Specimen:  Blood from Arm, Left Updated:  03/08/18 1338     Sodium 136 mmol/L      Potassium 4 5 mmol/L      Chloride 98 (L) mmol/L      CO2 28 mmol/L      Anion Gap 10 mmol/L      BUN 24 mg/dL      Creatinine 1 22 mg/dL      Glucose 240 (H) mg/dL      Calcium 9 8 mg/dL      AST 25 U/L      ALT 26 U/L      Alkaline Phosphatase 64 U/L      Total Protein 8 0 g/dL      Albumin 3 7 g/dL      Total Bilirubin 0 40 mg/dL      eGFR 47 ml/min/1 73sq m     Narrative:         National Kidney Disease Education Program recommendations are as follows:  GFR calculation is accurate only with a steady state creatinine  Chronic Kidney disease less than 60 ml/min/1 73 sq  meters  Kidney failure less than 15 ml/min/1 73 sq  meters      CK (with reflex to MB) [[id number]]  (Normal) Collected:  03/08/18 1231    Lab Status:  Final result Specimen:  Blood from Arm, Left Updated:  03/08/18 1338     Total  U/L     Magnesium [[id number]]  (Normal) Collected:  03/08/18 1231    Lab Status:  Final result Specimen:  Blood from Arm, Left Updated:  03/08/18 1338     Magnesium 1 6 mg/dL     Troponin I [[id number]]  (Normal) Collected:  03/08/18 1231    Lab Status:  Final result Specimen:  Blood from Arm, Left Updated:  03/08/18 1313     Troponin I <0 02 ng/mL     Narrative:         [employer] Chemistry analyzer 99% cutoff is > 0 04 ng/mL in network labs    o cTnI 99% cutoff is useful only when applied to patients in the clinical setting of myocardial ischemia  o cTnI 99% cutoff should be interpreted in the context of clinical history, ECG findings and possibly cardiac imaging to establish correct diagnosis  o cTnI 99% cutoff may be suggestive but clearly not indicative of a coronary event without the clinical setting of myocardial ischemia  Blood culture #1 [[id number]] Collected:  03/08/18 1231    Lab Status: In process Specimen:  Blood from Arm, Left Updated:  03/08/18 1246    Blood culture #2 [[id number]] Collected:  03/08/18 1231    Lab Status:   In process Specimen:  Blood from Arm, Right Updated:  03/08/18 1246    CBC and differential [[id number]]  (Abnormal) Collected:  03/08/18 1231    Lab Status:  Final result Specimen:  Blood from Arm, Left Updated:  03/08/18 1246     WBC 8 70 Thousand/uL      RBC 5 12 Million/uL      Hemoglobin 14 8 g/dL      Hematocrit 44 5 %      MCV 87 fL      MCH 28 8 pg      MCHC 33 2 g/dL      RDW 13 8 %      MPV 7 9 (L) fL      Platelets 028 Thousands/uL      nRBC 0 /100 WBCs      Neutrophils Relative 70 %      Lymphocytes Relative 19 %      Monocytes Relative 9 %      Eosinophils Relative 1 %      Basophils Relative 1 %      Neutrophils Absolute 6 10 Thousands/µL      Lymphocytes Absolute 1 60 Thousands/µL      Monocytes Absolute 0 80 Thousand/µL      Eosinophils Absolute 0 10 Thousand/µL      Basophils Absolute 0 10 Thousands/µL                  XR chest 1 view portable    (Results Pending) Procedures  ECG 12 Lead Documentation  Date/Time: 3/8/2018 11:55 AM  Performed by: Sarah Sport  Authorized by: Sarah Sport     Indications / Diagnosis:  Tachycardia  ECG reviewed by me, the ED Provider: yes    Patient location:  ED  Previous ECG:     Previous ECG:  Compared to current    Similarity:  Changes noted    Comparison to cardiac monitor: Yes    Interpretation:     Interpretation: abnormal    Rate:     ECG rate:  120    ECG rate assessment: tachycardic    Rhythm:     Rhythm: sinus tachycardia    Ectopy:     Ectopy: none    QRS:     QRS axis:  Normal    QRS intervals:  Normal  Conduction:     Conduction: normal    ST segments:     ST segments:  Normal  T waves:     T waves: normal             Phone Contacts  ED Phone Contact    ED Course  ED Course as of Mar 08 1433   Thu Mar 08, 2018   1402 Paged hospitalist           HEART Risk Score    Flowsheet Row Most Recent Value   History  1 Filed at: 03/08/2018 1423   ECG  0 Filed at: 03/08/2018 1423   Age  2 Filed at: 03/08/2018 1423   Risk Factors  2 Filed at: 03/08/2018 1423   Troponin  0 Filed at: 03/08/2018 1423   Heart Score Risk Calculator   History  1 Filed at: 03/08/2018 1423   ECG  0 Filed at: 03/08/2018 1423   Age  2 Filed at: 03/08/2018 1423   Risk Factors  2 Filed at: 03/08/2018 1423   Troponin  0 Filed at: 03/08/2018 1423   HEART Score  5 Filed at: 03/08/2018 1423   HEART Score  5 Filed at: 03/08/2018 1423                            MDM  Number of Diagnoses or Management Options  Shingles: Tachycardia:   Diagnosis management comments: Initially presented with sinus tach however however during patient's stay she had a short run of SVT however then she then broke into a normal sinus rhythm once again  Overall HR has come down, initially 130 with initial presentation jumping to 160 with SVT and then back to 112  During admission time she was 101  Feeling much better with IV fluids and pain control   Will admit for cardiac eval on tele and for pain control for shingles  Patient verbalizes understanding and agrees with the above assessment and plan  Amount and/or Complexity of Data Reviewed  Clinical lab tests: reviewed and ordered  Tests in the radiology section of CPT®: reviewed and ordered  Review and summarize past medical records: yes  Independent visualization of images, tracings, or specimens: yes      CritCare Time    Disposition  Final diagnoses:   Shingles   Tachycardia     Time reflects when diagnosis was documented in both MDM as applicable and the Disposition within this note     Time User Action Codes Description Comment    3/8/2018  2:06 PM Emilia Rivers Add [B02 9] Shingles     3/8/2018  2:07 PM Emilia Rivers Add [R00 0] Tachycardia     3/8/2018  2:12 PM Lilibeth Alexis Add [I47 1] SVT (supraventricular tachycardia) ([facility] )     3/8/2018  2:12 PM ZOE [address] [R00 2] Palpitations       ED Disposition     ED Disposition Condition Comment    Admit  Case was discussed with Dr Aime Davis and the patient's admission status was agreed to be Admission Status: inpatient status to the service of Dr Aime Davis   Follow-up Information    None       Patient's Medications   Discharge Prescriptions    No medications on file     No discharge procedures on file      ED Provider  Electronically Signed by           Devin Casiano PA-C  03/08/18 1893

## 2018-03-08 NOTE — PLAN OF CARE
CARDIOVASCULAR - ADULT     Maintains optimal cardiac output and hemodynamic stability Progressing     Absence of cardiac dysrhythmias or at baseline rhythm Progressing        INFECTION - ADULT     Absence or prevention of progression during hospitalization Progressing        PAIN - ADULT     Verbalizes/displays adequate comfort level or baseline comfort level Progressing        SKIN/TISSUE INTEGRITY - ADULT     Skin integrity remains intact Progressing     Incision(s), wounds(s) or drain site(s) healing without S/S of infection Progressing

## 2018-03-08 NOTE — ASSESSMENT & PLAN NOTE
-check HA1c (last one was 9 0 in 2016)  -cont with patients home glargine, place on ISS  -diabetic diet

## 2018-03-08 NOTE — ASSESSMENT & PLAN NOTE
-noted on tele  -EKG showing sinus tachy at 120  -patient with hx palpitations, was supposed to see cardio for holter monitor but has not had it done yet  -consult cardio for possible Holter now  -place on tele  -Check TSH  -treat pain from shingles

## 2018-03-09 LAB
ANION GAP SERPL CALCULATED.3IONS-SCNC: 8 MMOL/L (ref 4–13)
BUN SERPL-MCNC: 26 MG/DL (ref 5–25)
CALCIUM SERPL-MCNC: 8.8 MG/DL (ref 8.3–10.1)
CHLORIDE SERPL-SCNC: 100 MMOL/L (ref 100–108)
CO2 SERPL-SCNC: 29 MMOL/L (ref 21–32)
CREAT SERPL-MCNC: 1.3 MG/DL (ref 0.6–1.3)
ERYTHROCYTE [DISTWIDTH] IN BLOOD BY AUTOMATED COUNT: 14 % (ref 11.6–15.1)
EST. AVERAGE GLUCOSE BLD GHB EST-MCNC: 194 MG/DL
GFR SERPL CREATININE-BSD FRML MDRD: 43 ML/MIN/1.73SQ M
GLUCOSE SERPL-MCNC: 118 MG/DL (ref 65–140)
GLUCOSE SERPL-MCNC: 124 MG/DL (ref 65–140)
GLUCOSE SERPL-MCNC: 189 MG/DL (ref 65–140)
GLUCOSE SERPL-MCNC: 223 MG/DL (ref 65–140)
HBA1C MFR BLD: 8.4 % (ref 4.2–6.3)
HCT VFR BLD AUTO: 40.1 % (ref 37–47)
HGB BLD-MCNC: 13.1 G/DL (ref 12–16)
MAGNESIUM SERPL-MCNC: 1.7 MG/DL (ref 1.6–2.6)
MCH RBC QN AUTO: 28.7 PG (ref 27–31)
MCHC RBC AUTO-ENTMCNC: 32.6 G/DL (ref 31.4–37.4)
MCV RBC AUTO: 88 FL (ref 82–98)
PLATELET # BLD AUTO: 192 THOUSANDS/UL (ref 130–400)
PMV BLD AUTO: 7.6 FL (ref 8.9–12.7)
POTASSIUM SERPL-SCNC: 3.8 MMOL/L (ref 3.5–5.3)
RBC # BLD AUTO: 4.55 MILLION/UL (ref 4.2–5.4)
SODIUM SERPL-SCNC: 137 MMOL/L (ref 136–145)
WBC # BLD AUTO: 7.5 THOUSAND/UL (ref 4.8–10.8)

## 2018-03-09 PROCEDURE — 85027 COMPLETE CBC AUTOMATED: CPT | Performed by: STUDENT IN AN ORGANIZED HEALTH CARE EDUCATION/TRAINING PROGRAM

## 2018-03-09 PROCEDURE — 97165 OT EVAL LOW COMPLEX 30 MIN: CPT

## 2018-03-09 PROCEDURE — G8987 SELF CARE CURRENT STATUS: HCPCS

## 2018-03-09 PROCEDURE — G8988 SELF CARE GOAL STATUS: HCPCS

## 2018-03-09 PROCEDURE — 99232 SBSQ HOSP IP/OBS MODERATE 35: CPT | Performed by: INTERNAL MEDICINE

## 2018-03-09 PROCEDURE — 80048 BASIC METABOLIC PNL TOTAL CA: CPT | Performed by: STUDENT IN AN ORGANIZED HEALTH CARE EDUCATION/TRAINING PROGRAM

## 2018-03-09 PROCEDURE — 99232 SBSQ HOSP IP/OBS MODERATE 35: CPT | Performed by: STUDENT IN AN ORGANIZED HEALTH CARE EDUCATION/TRAINING PROGRAM

## 2018-03-09 PROCEDURE — 83735 ASSAY OF MAGNESIUM: CPT | Performed by: STUDENT IN AN ORGANIZED HEALTH CARE EDUCATION/TRAINING PROGRAM

## 2018-03-09 PROCEDURE — 83036 HEMOGLOBIN GLYCOSYLATED A1C: CPT | Performed by: STUDENT IN AN ORGANIZED HEALTH CARE EDUCATION/TRAINING PROGRAM

## 2018-03-09 PROCEDURE — 82948 REAGENT STRIP/BLOOD GLUCOSE: CPT

## 2018-03-09 RX ORDER — ACETAMINOPHEN 325 MG/1
325 TABLET ORAL ONCE AS NEEDED
Status: COMPLETED | OUTPATIENT
Start: 2018-03-09 | End: 2018-03-09

## 2018-03-09 RX ORDER — METOPROLOL TARTRATE 50 MG/1
50 TABLET, FILM COATED ORAL EVERY 12 HOURS SCHEDULED
Status: DISCONTINUED | OUTPATIENT
Start: 2018-03-09 | End: 2018-03-12 | Stop reason: HOSPADM

## 2018-03-09 RX ADMIN — ACYCLOVIR SODIUM 500 MG: 50 INJECTION, SOLUTION INTRAVENOUS at 22:21

## 2018-03-09 RX ADMIN — ASPIRIN 81 MG: 81 TABLET, COATED ORAL at 08:37

## 2018-03-09 RX ADMIN — LIDOCAINE 2 PATCH: 50 PATCH TOPICAL at 08:40

## 2018-03-09 RX ADMIN — SODIUM CHLORIDE 1000 ML: 0.9 INJECTION, SOLUTION INTRAVENOUS at 18:58

## 2018-03-09 RX ADMIN — LISINOPRIL 10 MG: 10 TABLET ORAL at 08:40

## 2018-03-09 RX ADMIN — ENOXAPARIN SODIUM 40 MG: 40 INJECTION SUBCUTANEOUS at 08:41

## 2018-03-09 RX ADMIN — INSULIN LISPRO 2 UNITS: 100 INJECTION, SOLUTION INTRAVENOUS; SUBCUTANEOUS at 12:55

## 2018-03-09 RX ADMIN — ACETAMINOPHEN 650 MG: 325 TABLET, FILM COATED ORAL at 16:25

## 2018-03-09 RX ADMIN — GABAPENTIN 100 MG: 100 CAPSULE ORAL at 08:40

## 2018-03-09 RX ADMIN — GABAPENTIN 100 MG: 100 CAPSULE ORAL at 22:22

## 2018-03-09 RX ADMIN — ACYCLOVIR SODIUM 500 MG: 50 INJECTION, SOLUTION INTRAVENOUS at 05:40

## 2018-03-09 RX ADMIN — INSULIN LISPRO 4 UNITS: 100 INJECTION, SOLUTION INTRAVENOUS; SUBCUTANEOUS at 16:26

## 2018-03-09 RX ADMIN — MONTELUKAST SODIUM 10 MG: 10 TABLET, COATED ORAL at 22:25

## 2018-03-09 RX ADMIN — ACETAMINOPHEN 650 MG: 325 TABLET, FILM COATED ORAL at 08:36

## 2018-03-09 RX ADMIN — INSULIN GLARGINE 40 UNITS: 100 INJECTION, SOLUTION SUBCUTANEOUS at 22:22

## 2018-03-09 RX ADMIN — ACETAMINOPHEN 325 MG: 325 TABLET, FILM COATED ORAL at 23:42

## 2018-03-09 RX ADMIN — TORSEMIDE 10 MG: 10 TABLET ORAL at 08:40

## 2018-03-09 RX ADMIN — INSULIN GLARGINE 40 UNITS: 100 INJECTION, SOLUTION SUBCUTANEOUS at 08:40

## 2018-03-09 RX ADMIN — INSULIN LISPRO 2 UNITS: 100 INJECTION, SOLUTION INTRAVENOUS; SUBCUTANEOUS at 22:38

## 2018-03-09 RX ADMIN — ACYCLOVIR SODIUM 500 MG: 50 INJECTION, SOLUTION INTRAVENOUS at 13:23

## 2018-03-09 RX ADMIN — ATORVASTATIN CALCIUM 40 MG: 40 TABLET, FILM COATED ORAL at 16:26

## 2018-03-09 RX ADMIN — ACETAMINOPHEN 650 MG: 325 TABLET, FILM COATED ORAL at 22:23

## 2018-03-09 RX ADMIN — GABAPENTIN 100 MG: 100 CAPSULE ORAL at 16:26

## 2018-03-09 RX ADMIN — METOPROLOL TARTRATE 50 MG: 50 TABLET ORAL at 22:24

## 2018-03-09 RX ADMIN — FAMOTIDINE 20 MG: 20 TABLET, FILM COATED ORAL at 08:37

## 2018-03-09 NOTE — ASSESSMENT & PLAN NOTE
-noted on tele, short run of SVT  -EKG was done and showed sinus tachy at 120  -patient with hx palpitations, was supposed to see cardio for holter monitor but has not had it done yet  -consulted cardio  -placed on tele, sinus tachy to low 100s  -TSH 3 038  -treat pain from shingles  -start low dose metoprolol  -if HR better can DC home tomorrow with outpt holter

## 2018-03-09 NOTE — CONSULTS
CARDIOLOGY CONSULTATION  Stacy Salvador 74 y o  female MRN: [id number]  Unit/Bed#: [address] [place] [address] Encounter: [id number]      History of Present Illness   Physician Requesting Consult: George Werner MD  Reason for Consult / Principal Problem:  Palpitations    Assessment/Plan   Paroxysmal SVT- continue telemetry monitor  Brief runs of atrial tachycardia  12 lead ECG with evidence of sinus tachycardia  Start metoprolol 37 5mg bid  Morbid obesity  Possible sleep apnea  Prior smoking  Possible shingles/intractable pain  Type 2 diabetes mellitus on long-term insulin  The hyperlipidemia    HPI: Stacy Salvador is a 74y o  year old female presented with severe burning the over her left breast hold that began several days ago with severe 10/10 pain  She denied it worsening with exertion  She denies having dizziness or syncope  She reports having palpitations paroxysmal which were happening for the past couple of months  She denies having any vision changes, numbness or focal weakness  In the ED she was noted to be tachycardic with rates as high as 160 which prostate is heard back to the low 100s        Historical Information   Past Medical History:   Diagnosis Date    Asthma     Diabetes mellitus ([address] )     GERD (gastroesophageal reflux disease)     Hyperlipidemia     Hypertension     Kidney stones     PONV (postoperative nausea and vomiting)      Past Surgical History:   Procedure Laterality Date    APPENDECTOMY      CYSTOSCOPY W/ LASER LITHOTRIPSY Left 7/12/2016    Procedure: CYSTOSCOPY URETEROSCOPY WITH LITHOTRIPSY HOLMIUM LASER, RETROGRADE PYELOGRAM AND INSERTION STENT URETERAL;  Surgeon: Maria R العراقي MD;  Location: [address] [place] [address];  Service:     DILATION AND CURETTAGE OF UTERUS      JOINT REPLACEMENT      right knee    KNEE ARTHROPLASTY Right     PA CYSTOURETHROSCOPY,URETER CATHETER Left 6/29/2016    Procedure: CYSTOSCOPY RETROGRADE PYELOGRAM WITH INSERTION STENT URETERAL, left;  Surgeon: Maria R العراقي MD;  Location: [facility];  Service: Urology    SHOULDER ARTHROTOMY Left      History   Alcohol Use    Yes     Comment: rarely     History   Drug Use No     History   Smoking Status    Former Smoker    Packs/day: 1 00    Years: 20 00    Types: Cigarettes    Quit date: 7/8/1996   Smokeless Tobacco    Never Used     History reviewed  No pertinent family history  Meds/Allergies   Prior to Admission medications    Medication Sig Start Date End Date Taking?  Authorizing Provider   albuterol (PROVENTIL HFA,VENTOLIN HFA) 90 mcg/act inhaler Inhale 2 puffs every 6 (six) hours as needed for wheezing   Yes Historical Provider, MD   aspirin (ECOTRIN LOW STRENGTH) 81 mg EC tablet Take 81 mg by mouth daily   Yes Historical Provider, MD   Dulaglutide (TRULICITY) 1 5 VA/3 7XK SOPN Inject 1 5 mg under the skin once a week   Yes Historical Provider, MD   Insulin Glargine (TOUJEO SOLOSTAR) injection pen 300 units/mL Inject 40 Units under the skin 2 (two) times a day   Yes Historical Provider, MD   insulin lispro (HumaLOG) 100 units/mL injection Inject 10 Units under the skin 3 (three) times a day before meals     Yes Historical Provider, MD   lisinopril (ZESTRIL) 10 mg tablet Take 10 mg by mouth daily   Yes Historical Provider, MD   Mometasone Furo-Formoterol Fum (DULERA) 100-5 MCG/ACT AERO Inhale 2 puffs 2 (two) times a day   Yes Historical Provider, MD   montelukast (SINGULAIR) 10 mg tablet Take 10 mg by mouth daily     Yes Historical Provider, MD   ranitidine (ZANTAC) 150 mg tablet Take 150 mg by mouth 2 (two) times a day as needed     Yes Historical Provider, MD   rosuvastatin (CRESTOR) 20 MG tablet Take 20 mg by mouth daily   Yes Historical Provider, MD   torsemide (DEMADEX) 10 mg tablet Take 10 mg by mouth daily   Yes Historical Provider, MD   Canagliflozin-Metformin HCl (INVOKAMET)  MG TABS Take 1 tablet by mouth 2 (two) times a day  3/8/18  Historical Provider, MD   desloratadine (CLARINEX) 5 MG tablet Take 5 mg by mouth daily  3/8/18  Historical Provider, MD   esomeprazole (NexIUM) 40 MG capsule Take 40 mg by mouth every morning before breakfast   3/8/18  Historical Provider, MD   ezetimibe-simvastatin (VYTORIN) 10-40 mg per tablet Take 1 tablet by mouth daily at bedtime  3/8/18  Historical Provider, MD   fosinopril-hydrochlorothiazide (MONOPRIL-HCT) 10-12 5 MG per tablet Take 1 tablet by mouth daily  3/8/18  Historical Provider, MD   insulin glargine (LANTUS) 100 units/mL subcutaneous injection Inject 34 Units under the skin 2 (two) times a day    3/8/18  Historical Provider, MD   ondansetron (ZOFRAN-ODT) 4 mg disintegrating tablet Take 1 tablet by mouth every 8 (eight) hours as needed for nausea or vomiting for up to 10 doses 9/4/17 3/8/18  Doug Aquino MD     Current Facility-Administered Medications   Medication Dose Route Frequency Provider Last Rate Last Dose    acetaminophen (TYLENOL) tablet 650 mg  650 mg Oral Q6H PRN Christ Navarrete MD        acyclovir (ZOVIRAX) 500 mg in sodium chloride 0 9 % 100 mL IVPB  10 mg/kg ([place]) Intravenous Q8H Christ Navarrete MD        albuterol (PROVENTIL HFA,VENTOLIN HFA) inhaler 2 puff  2 puff Inhalation Q6H PRN Christ Navarrete MD        [START ON 3/9/2018] aspirin (ECOTRIN LOW STRENGTH) EC tablet 81 mg  81 mg Oral Daily Christ Navarrete MD        atorvastatin (LIPITOR) tablet 40 mg  40 mg Oral Daily With Corrie Olea MD   40 mg at 03/08/18 1822    [START ON 3/9/2018] enoxaparin (LOVENOX) subcutaneous injection 40 mg  40 mg Subcutaneous Daily Christ Navarrete MD       [employer] [facility] Corn ON 3/9/2018] famotidine (PEPCID) tablet 20 mg  20 mg Oral Daily Christ Navarrete MD        fluticasone-salmeterol (ADVAIR) 100-50 mcg/dose inhaler 2 puff  2 puff Inhalation Q12H 3630 Corey Veras MD        gabapentin (NEURONTIN) capsule 100 mg  100 mg Oral TID Christ Navarrete MD   100 mg at 03/08/18 1823    insulin glargine (LANTUS) subcutaneous injection 40 Units  40 Units Subcutaneous Q12H 3630 MD Singh Limon insulin lispro (HumaLOG) 100 units/mL subcutaneous injection 1-5 Units  1-5 Units Subcutaneous HS Lavern Murray MD        insulin lispro (HumaLOG) 100 units/mL subcutaneous injection 2-12 Units  2-12 Units Subcutaneous TID [facility] Lavern Murray MD   2 Units at 03/08/18 1824    lidocaine (LIDODERM) 5 % patch 2 patch  2 patch Transdermal Daily Lavern Murray MD   2 patch at 03/08/18 1823    [START ON 3/9/2018] lisinopril (ZESTRIL) tablet 10 mg  10 mg Oral Daily Lavern Murray MD        montelukast (SINGULAIR) tablet 10 mg  10 mg Oral HS Lavern Murray MD        ondansetron (ZOFRAN) injection 4 mg  4 mg Intravenous Q6H PRN Lavern Murray MD       [facility] [START ON 3/9/2018] torsemide (DEMADEX) tablet 10 mg  10 mg Oral Daily Lavern Murray MD         Allergies   Allergen Reactions    Asa [Aspirin] GI Intolerance    Indocin [Indomethacin] Other (See Comments)     Made patient "loopy"    Penicillins Hives       Review of systems  CONSTITUTIONAL:  No weight loss, fever, chills, weakness or fatigue  HEENT:  Eyes:  No visual loss, blurred vision, double vision or yellow sclerae  Ears, Nose, Throat:  No hearing loss, sneezing, congestion, runny nose or sore throat  SKIN:  No rash or itching  CARDIOVASCULAR:  As per HPI  RESPIRATORY:  As per HPI  GASTROINTESTINAL:  No anorexia, nausea, vomiting or diarrhea  No abdominal pain or blood  GENITOURINARY:  Burning on urination  No flank pain  NEUROLOGICAL:  No headache, dizziness, syncope, paralysis, ataxia, numbness or tingling in the extremities  No change in bowel or bladder control  MUSCULOSKELETAL:  No muscle, back pain, joint pain or stiffness  HEMATOLOGIC:  No anemia, bleeding or bruising  LYMPHATICS:  No enlarged nodes  No history of splenectomy  PSYCHIATRIC:  No active suicidal or homicidal ideation  ENDOCRINOLOGIC:  No reports of sweating, cold or heat intolerance  No polyuria or polydipsia  ALLERGIES:  No history of asthma, hives, eczema or rhinitis      More than 10 systems reviewed and otherwise noncontributory  Objective   Vitals: Blood pressure 150/71, pulse (!) 106, temperature 97 8 °F (36 6 °C), temperature source Oral, resp  rate 22, height 5' 2" (1 575 m), weight 118 kg (260 lb 2 3 oz), SpO2 97 %  Physical Exam   Constitutional: She is oriented to person, place, and time  No distress  HENT:   Head: Normocephalic and atraumatic  Right Ear: External ear normal    Left Ear: External ear normal    Nose: Nose normal    Mouth/Throat: No oropharyngeal exudate  Eyes: Conjunctivae are normal  Pupils are equal, round, and reactive to light  Right eye exhibits no discharge  Left eye exhibits no discharge  No scleral icterus  Neck: Normal range of motion  No JVD present  No tracheal deviation present  No thyromegaly present  Cardiovascular: Normal rate, regular rhythm and intact distal pulses  Exam reveals gallop  Exam reveals no friction rub  Murmur heard  Pulmonary/Chest: Effort normal and breath sounds normal  No stridor  No respiratory distress  She has no wheezes  She has no rales  She exhibits no tenderness  Abdominal: Soft  Bowel sounds are normal  She exhibits distension  She exhibits no mass  There is no tenderness  There is no rebound and no guarding  Genitourinary:   Genitourinary Comments: No CVA tenderness   Musculoskeletal: She exhibits deformity  She exhibits no edema or tenderness  Neurological: She is alert and oriented to person, place, and time  She displays normal reflexes  She exhibits normal muscle tone  Skin: Skin is warm and dry  Rash noted  She is not diaphoretic  There is erythema  Psychiatric: She has a normal mood and affect  Her behavior is normal  Judgment and thought content normal    Nursing note and vitals reviewed        Recent Results (from the past 24 hour(s))   CBC and differential    Collection Time: 03/08/18 12:31 PM   Result Value Ref Range    WBC 8 70 4 80 - 10 80 Thousand/uL    RBC 5 12 4 20 - 5 40 Million/uL    Hemoglobin 14 8 12 0 - 16 0 g/dL    Hematocrit 44 5 37 0 - 47 0 %    MCV 87 82 - 98 fL    MCH 28 8 27 0 - 31 0 pg    MCHC 33 2 31 4 - 37 4 g/dL    RDW 13 8 11 6 - 15 1 %    MPV 7 9 (L) 8 9 - 12 7 fL    Platelets 285 518 - 518 Thousands/uL    nRBC 0 /100 WBCs    Neutrophils Relative 70 43 - 75 %    Lymphocytes Relative 19 14 - 44 %    Monocytes Relative 9 4 - 12 %    Eosinophils Relative 1 0 - 6 %    Basophils Relative 1 0 - 1 %    Neutrophils Absolute 6 10 1 85 - 7 62 Thousands/µL    Lymphocytes Absolute 1 60 0 60 - 4 47 Thousands/µL    Monocytes Absolute 0 80 0 17 - 1 22 Thousand/µL    Eosinophils Absolute 0 10 0 00 - 0 61 Thousand/µL    Basophils Absolute 0 10 0 00 - 0 10 Thousands/µL   Comprehensive metabolic panel    Collection Time: 03/08/18 12:31 PM   Result Value Ref Range    Sodium 136 136 - 145 mmol/L    Potassium 4 5 3 5 - 5 3 mmol/L    Chloride 98 (L) 100 - 108 mmol/L    CO2 28 21 - 32 mmol/L    Anion Gap 10 4 - 13 mmol/L    BUN 24 5 - 25 mg/dL    Creatinine 1 22 0 60 - 1 30 mg/dL    Glucose 240 (H) 65 - 140 mg/dL    Calcium 9 8 8 3 - 10 1 mg/dL    AST 25 5 - 45 U/L    ALT 26 12 - 78 U/L    Alkaline Phosphatase 64 46 - 116 U/L    Total Protein 8 0 6 4 - 8 2 g/dL    Albumin 3 7 3 5 - 5 0 g/dL    Total Bilirubin 0 40 0 20 - 1 00 mg/dL    eGFR 47 ml/min/1 73sq m   Troponin I    Collection Time: 03/08/18 12:31 PM   Result Value Ref Range    Troponin I <0 02 <=0 04 ng/mL   CK (with reflex to MB)    Collection Time: 03/08/18 12:31 PM   Result Value Ref Range    Total  26 - 192 U/L   Magnesium    Collection Time: 03/08/18 12:31 PM   Result Value Ref Range    Magnesium 1 6 1 6 - 2 6 mg/dL   CKMB    Collection Time: 03/08/18 12:31 PM   Result Value Ref Range    CK-MB Index 1 1 0 0 - 2 5 %    CK-MB FRACTION 1 9 0 0 - 5 0 ng/mL   TSH, 3rd generation    Collection Time: 03/08/18 12:31 PM   Result Value Ref Range    TSH 3RD GENERATON 3 038 0 358 - 3 740 uIU/mL   Fingerstick Glucose (POCT)    Collection Time: 03/08/18  5:02 PM Result Value Ref Range    POC Glucose 199 (H) 65 - 140 mg/dl     Imaging: I have personally reviewed pertinent films in PACS    EKG:  Sinus tachycardia nonspecific T-wave changes    Counseling / Coordination of Care  Total time spent today 60 minutes  Greater than 50% of total time was spent with the patient and / or family counseling and / or coordination of care  Connie Novak MD [facility]      "This note has been constructed using a voice recognition system  Therefore there may be syntax, spelling, and/or grammatical errors   Please call if you have any questions  "

## 2018-03-09 NOTE — CASE MANAGEMENT
Initial Clinical Review    Admission: Date/Time/Statement: 3/8/18 @ 1407     Orders Placed This Encounter   Procedures    Inpatient Admission (expected length of stay for this patient is greater than two midnights)     Standing Status:   Standing     Number of Occurrences:   1     Order Specific Question:   Admitting Physician     Answer:   Chito Kulkarni     Order Specific Question:   Level of Care     Answer:   Med Surg [16]     Order Specific Question:   Estimated length of stay     Answer:   More than 2 Midnights     Order Specific Question:   Certification     Answer:   I certify that inpatient services are medically necessary for this patient for a duration of greater than two midnights  See H&P and MD Progress Notes for additional information about the patient's course of treatment  ED: Date/Time/Mode of Arrival:   ED Arrival Information     Expected Arrival Acuity Means of Arrival Escorted By Service Admission Type    - 3/8/2018 11:23 Urgent Walk-In Self General Medicine Urgent    Arrival Complaint    rash          Chief Complaint:   Chief Complaint   Patient presents with    Rash     Pt sts rash for 2 days under left breast and around to back  Red, raised rash under left breast radiating around to back  Also c/o headache and nausea       History of Illness: Chau Stoddard is a 74 y o  female with a PMH of asthma, type 2 diabetes on long-term insulin, hyperlipidemia, morbid obesity, and undiagnosed palpitations who presents with severe burning pain over the left breast fold that began several days ago and has worsened to the point that the pain is now 10/10  It is associated with pruritus, generalized feeling of being unwell, body aches, headache  She thinks it is shingles and has not had her shingles vaccine  She is also having palpitations which have been ongoing for several months    She seen her PCP and was scheduled to have a Holter monitor placed however she has not had it done yet   She has never had a cardiologist   Currently she denies any chest pain  She has some nausea but no emesis  She denies shortness of breath or lightheadedness  She denies abdominal pain or urinary symptoms  She denies vision changes numbness or focal weakness      In the ED she was noted to be in sinus tachy in the low 100  She then was noted to have a sudden run of a short SVT with heart rate increasing to 160s and then spontaneously converting back to the low 100s          ED Vital Signs:   ED Triage Vitals   Temperature Pulse Respirations Blood Pressure SpO2   03/08/18 1142 03/08/18 1142 03/08/18 1142 03/08/18 1142 03/08/18 1142   98 6 °F (37 °C) (!) 132 20 (!) 190/91 98 %      Temp Source Heart Rate Source Patient Position - Orthostatic VS BP Location FiO2 (%)   03/08/18 1706 03/08/18 1443 03/08/18 1348 03/08/18 1348 --   Oral Monitor Lying Right arm       Pain Score       03/08/18 1142       8        Wt Readings from Last 1 Encounters:   03/08/18 118 kg (260 lb 2 3 oz)       Vital Signs (abnormal):   03/08/18 1345  --   116  14  153/79  95 %  --  --   03/08/18 1330  --   115  15  --  94 %  --  --   03/08/18 1315  --   114  18  --  95 %  --  --   03/08/18 1300  --   116  13  --  95 %  --  --   03/08/18 1245  --   121  16  --  94 %  --  --   03/08/18 1230  --   125  21  167/76  97 %  --  --   03/08/18 1142  98 6 °F (37 °C)   132  20   190/91  98 %  --  --     Vital Signs (last 2 days)     Date/Time Temp Pulse Resp BP SpO2 O2 Device Patient Position - Orthostatic VS   03/09/18 0839 100 2 °F (37 9 °C)  119 20 130/60 93 % None (Room air)          Abnormal Labs/Diagnostic Test Results:   Results from last 7 days  Lab Units 03/08/18  1231   WBC Thousand/uL 8 70   HEMOGLOBIN g/dL 14 8   HEMATOCRIT % 44 5   PLATELETS Thousands/uL 230   NEUTROS PCT % 70   LYMPHS PCT % 19   MONOS PCT % 9   EOS PCT % 1         Results from last 7 days  Lab Units 03/08/18  1231   SODIUM mmol/L 136   POTASSIUM mmol/L 4 5   CHLORIDE mmol/L 98*   CO2 mmol/L 28   BUN mg/dL 24   CREATININE mg/dL 1 22   CALCIUM mg/dL 9 8   TOTAL PROTEIN g/dL 8 0   BILIRUBIN TOTAL mg/dL 0 40   ALK PHOS U/L 64   ALT U/L 26   AST U/L 25   GLUCOSE RANDOM mg/dL 240*             Imaging: I have personally reviewed pertinent reports        XR chest 1 view portable   Final Result by Thaddeus Diaz MD (03/08 1506)       No acute cardiopulmonary disease                Workstation performed: [id number]                 XR chest 1 view portable   Final Result       No acute cardiopulmonary disease                Workstation performed: [id number]                 EKG, Pathology, and Other Studies Reviewed on Admission:   · EKG: sinus tachy at 120 bpm    ED Treatment:   Medication Administration from 03/08/2018 1123 to 03/08/2018 1638       Date/Time Order Dose Route Action Action by Comments     03/08/2018 1622 sodium chloride 0 9 % bolus 1,000 mL 0 mL Intravenous Stopped Donnie Aguilar RN      03/08/2018 1232 sodium chloride 0 9 % bolus 1,000 mL 1,000 mL Intravenous Casi Page, RN      03/08/2018 1236 morphine (PF) 4 mg/mL injection 4 mg 4 mg Intravenous Given Donnie Aguilar RN      03/08/2018 1443 acyclovir (ZOVIRAX) 500 mg in sodium chloride 0 9 % 100 mL IVPB 0 mg/kg Intravenous Stopped Cherri Van RN      03/08/2018 1314 acyclovir (ZOVIRAX) 500 mg in sodium chloride 0 9 % 100 mL IVPB 500 mg Intravenous [address] Donnie Aguilar RN      03/08/2018 1439 ondansetron (ZOFRAN) injection 4 mg 4 mg Intravenous Given Cherri Van RN           Past Medical/Surgical History:    Active Ambulatory Problems     Diagnosis Date Noted    No Active Ambulatory Problems     Resolved Ambulatory Problems     Diagnosis Date Noted    Left ureteral stone 06/29/2016     Past Medical History:   Diagnosis Date    Asthma     Diabetes mellitus ([address] )     GERD (gastroesophageal reflux disease)     Hyperlipidemia     Hypertension     Kidney stones     PONV (postoperative nausea and vomiting)        Admitting Diagnosis: Palpitations [R00 2]  Shingles [B02 9]  Rash [R21]  SVT (supraventricular tachycardia) ([facility]) [I47 1]  Tachycardia [R00 0]    Age/Sex: 74 y o  female    Assessment/Plan:        Morbid obesity with BMI of 45 0-49 9, adult (HCC)   Assessment & Plan     -advised diet with loss  -Nutrition consult          Uncontrolled diabetes mellitus type 2 without complications ([facility] [address] )   Assessment & Plan     -check HA1c (last one was 9 0 in 2016)  -cont with patients home glargine, place on ISS  -diabetic diet          Asthma   Assessment & Plan     -no exacerbation  -cont with home meds          Intractable pain   Assessment & Plan     -2/2 shingles  -treat as noted          Shingles outbreak   Assessment & Plan     -painful  -Patients stated on acyclovir in the ED  -will add Neurontin and lidocaine patch          * SVT (supraventricular tachycardia) ([facility])   Assessment & Plan     -noted on tele  -EKG showing sinus tachy at 120  -patient with hx palpitations, was supposed to see cardio for holter monitor but has not had it done yet  -consult cardio for possible Holter now  -place on tele  -Check TSH  -treat pain from shingles                VTE Prophylaxis: Enoxaparin (Lovenox)  / sequential compression device   Code Status: Prior     Anticipated Length of Stay:  Patient will be admitted on an Inpatient basis with an anticipated length of stay of  > 2 midnights     Justification for Hospital Stay:  Life-threatening arrhythmia         Admission Orders:  [email or web address]  PT/OT  TELE  SEQ COMP DEVICE  CONS CARB DIET  BEDREST  CONSULT CARDIOLOGY    Scheduled Meds:   Current Facility-Administered Medications:  acetaminophen 650 mg Oral Q6H PRN Juan Jose Muller MD    acyclovir 10 mg/kg (Ideal) Intravenous Q8H Juan Jose Muller MD Last Rate: 500 mg (03/09/18 0540)   albuterol 2 puff Inhalation Q6H PRN Juan Jose Muller MD    aspirin 81 mg Oral Daily Juan Jose Muller MD    atorvastatin 40 mg Oral Daily With [employer] MD    enoxaparin 40 mg Subcutaneous Daily Carolina Jimenez MD    famotidine 20 mg Oral Daily Carolina Jimenez MD    fluticasone-salmeterol 2 puff Inhalation Q12H 3630 Corey Veras MD    gabapentin 100 mg Oral TID Carolina Jimenez MD    insulin glargine 40 Units Subcutaneous Q12H 3630 Corey Veras MD    insulin lispro 1-5 Units Subcutaneous HS Carolina Jimenez MD    insulin lispro 2-12 Units Subcutaneous TID AC Carolina Jimenez MD    lidocaine 2 patch Transdermal Daily Carolina Jimenez MD    lisinopril 10 mg Oral Daily Carolina Jimenez MD    montelukast 10 mg Oral HS Carolina Jimenez MD    ondansetron 4 mg Intravenous Q6H PRN Carolina Jimenez MD    torsemide 10 mg Oral Daily Carolina Jimenez MD      Continuous Infusions:    PRN Meds:   Acetaminophen X1    albuterol    ondansetron

## 2018-03-09 NOTE — PROGRESS NOTES
Progress Note - Gideon Petersen 1950, 74 y o  female MRN: [id number]    Unit/Bed#: [address] [place] [address] Encounter: [id number]    Primary Care Provider: Mathew Romero   Date and time admitted to hospital: 3/8/2018 11:34 AM        * SVT (supraventricular tachycardia) ([place] [address] )   Assessment & Plan    -noted on tele, short run of SVT  -EKG was done and showed sinus tachy at 120  -patient with hx palpitations, was supposed to see cardio for holter monitor but has not had it done yet  -consulted cardio  -placed on tele, sinus tachy to low 100s  -TSH 3 038  -treat pain from shingles  -start low dose metoprolol  -if HR better can DC home tomorrow with outpt holter  Intractable pain   Assessment & Plan    -2/2 shingles  -treat as noted, improved        Shingles outbreak   Assessment & Plan    -Patients stated on acyclovir  -will add Neurontin and lidocaine patch  -pain improved with current treatment        Uncontrolled diabetes mellitus type 2 without complications (HCC)   Assessment & Plan    -HA1c 8 4  -cont with patients home glargine, place on ISS  -diabetic diet        Morbid obesity with BMI of 45 0-49 9, adult ([place] [address] )   Assessment & Plan    -advised diet with loss  -Nutrition consult        Asthma   Assessment & Plan    -no exacerbation  -cont with home meds            VTE Pharmacologic Prophylaxis:   Pharmacologic: Enoxaparin (Lovenox)  Mechanical VTE Prophylaxis in Place: Yes    Patient Centered Rounds: I have performed bedside rounds with nursing staff today  Discussions with Specialists or Other Care Team Provider: Yes Cardio    Education and Discussions with Family / Patient:Yes    Time Spent for Care: 30 minutes  More than 50% of total time spent on counseling and coordination of care as described above      Current Length of Stay: 1 day(s)    Current Patient Status: Inpatient     Discharge Plan: home tomorrow if HR better    Code Status: Level 1 - Full Code      Subjective:   Taylorck Mandy feels better, pain from shingles is improved  She denies any further palpitations since arrival        Objective:       Vitals:   Temp (24hrs), Av 2 °F (37 3 °C), Min:97 8 °F (36 6 °C), Max:100 2 °F (37 9 °C)    HR:  [] 108  Resp:  [18-30] 18  BP: (126-150)/(60-71) 126/65  SpO2:  [93 %-98 %] 93 %  Body mass index is 47 58 kg/m²  Input and Output Summary (last 24 hours): Intake/Output Summary (Last 24 hours) at 18 1549  Last data filed at 18 1257   Gross per 24 hour   Intake              720 ml   Output             1000 ml   Net             -280 ml       Physical Exam:     Physical Exam   Constitutional: She is oriented to person, place, and time  She appears well-developed  No distress  HENT:   Head: Normocephalic and atraumatic  Cardiovascular: Normal rate, regular rhythm and normal heart sounds  Pulmonary/Chest: Effort normal and breath sounds normal  No respiratory distress  She has no wheezes  She has no rales  She exhibits no tenderness  Abdominal: Soft  Bowel sounds are normal  She exhibits no distension  There is no tenderness  There is no rebound and no guarding  Musculoskeletal: She exhibits no edema, tenderness or deformity  Neurological: She is alert and oriented to person, place, and time  Skin: Skin is warm and dry  Vesicular rash in a dermatomal pattern under the left breast fold, drying out compared to yesterday  Lidocaine patches over parts of the rash   Psychiatric: She has a normal mood and affect  Her behavior is normal    Nursing note and vitals reviewed        Additional Data:     Labs:      Results from last 7 days  Lab Units 18  0548 18  1231   WBC Thousand/uL 7 50 8 70   HEMOGLOBIN g/dL 13 1 14 8   HEMATOCRIT % 40 1 44 5   PLATELETS Thousands/uL 192 230   NEUTROS PCT %  --  70   LYMPHS PCT %  --  19   MONOS PCT %  --  9   EOS PCT %  --  1       Results from last 7 days  Lab Units 18  0548 18  1231   SODIUM mmol/L 137 136   POTASSIUM mmol/L 3 8 4 5   CHLORIDE mmol/L 100 98*   CO2 mmol/L 29 28   BUN mg/dL 26* 24   CREATININE mg/dL 1 30 1 22   CALCIUM mg/dL 8 8 9 8   TOTAL PROTEIN g/dL  --  8 0   BILIRUBIN TOTAL mg/dL  --  0 40   ALK PHOS U/L  --  64   ALT U/L  --  26   AST U/L  --  25   GLUCOSE RANDOM mg/dL 118 240*           * I Have Reviewed All Lab Data Listed Above  * Additional Pertinent Lab Tests Reviewed: All Labs For Current Hospital Admission Reviewed    Imaging:  Xr Chest 1 View Portable    Result Date: 3/8/2018  Narrative: CHEST INDICATION: 61-year-old female with dyspnea, vomiting, tachycardia  COMPARISON:  2/13/2015 EXAM PERFORMED/VIEWS:  XR CHEST PORTABLE Images: 1 FINDINGS: Cardiomediastinal silhouette appears unremarkable  The lungs are clear  No pneumothorax or pleural effusion  Osseous structures appear within normal limits for patient age  Impression: No acute cardiopulmonary disease   Workstation performed: [id number]     Imaging Reports Reviewed by myself    Cultures:   Blood Culture: No results found for: BLOODCX  Urine Culture:   Lab Results   Component Value Date    URINECX  11/30/2016     50,000-59,000 cfu/ml beta hemolytic Streptococcus Group B    URINECX <10,000 cfu/ml Mixed Contaminants X2 11/30/2016    URINECX  06/29/2016     30,000-39,000 cfu/ml beta hemolytic Streptococcus Group B    URINECX 40,000-49,000 cfu/ml Mixed Contaminants X3 06/29/2016     Sputum Culture: No components found for: SPUTUMCX  Wound Culture: No results found for: WOUNDCULT    Last 24 Hours Medication List:     Current Facility-Administered Medications:  acetaminophen 650 mg Oral Q6H PRN Anthony Anna MD    acyclovir 10 mg/kg (Ideal) Intravenous Gurpreet Fam MD Last Rate: 500 mg (03/09/18 1323)   albuterol 2 puff Inhalation Q6H PRN Anthony Anna MD    aspirin 81 mg Oral Daily Anthony Anna MD    atorvastatin 40 mg Oral Daily With Chyna Noel MD    enoxaparin 40 mg Subcutaneous Daily Anthony Anna MD    famotidine 20 mg Oral Daily Anthony Anna MD fluticasone-salmeterol 2 puff Inhalation Q12H 3630 Corey Veras MD    gabapentin 100 mg Oral TID Gualberto Castañeda MD    insulin glargine 40 Units Subcutaneous Q12H 3630 Corey Veras MD    insulin lispro 1-5 Units Subcutaneous HS Gualberto Castañeda MD    insulin lispro 2-12 Units Subcutaneous TID AC Gualberto Castañeda MD    lidocaine 2 patch Transdermal Daily Gualberto Castañeda MD    lisinopril 10 mg Oral Daily Gualberto Castañeda MD    metoprolol tartrate 25 mg Oral Q12H 3630 Corey Veras MD    montelukast 10 mg Oral HS Gualberto Castañeda MD    ondansetron 4 mg Intravenous Q6H PRN Gualberto Castañeda MD    torsemide 10 mg Oral Daily Gualberto Castañeda MD         Today, Patient Was Seen By: Gualberto Castañeda MD    ** Please Note: Dragon 360 Dictation voice to text software may have been used in the creation of this document   **

## 2018-03-09 NOTE — PROGRESS NOTES
As per DR Yaniv Peck to follow up with Nurse supervisor about airborne isolation for shingles  As per Sienna Tomas RN - it only required contact precaution

## 2018-03-09 NOTE — SOCIAL WORK
Met with pt  Resides alone anna single floor apt with 5 steps to enter  Has RW, cane, commode, w/c, and tub bench, but does not use them  No hhc  Has been independent and drives  No LW or POA, but wants her Niece Petrona Johnson to be her POA  Explained role of cm, no d/c needs anticipated  CM reviewed d/c planning process including the following: identifying help at home, patient preference for d/c planning needs, and availability of the treatment team to discuss questions or concerns patient and/or family may have regarding understanding of medications and recognizing signs and symptoms once discharged  CM also encouraged patient to follow up with all recommended appointments after discharge  Patient advised of importance for patient and family to participate in managing patient's medical well being

## 2018-03-09 NOTE — OCCUPATIONAL THERAPY NOTE
OT EVALUATION    Patient is not in need of skilled OT or PT at this time  Pt is independent with ADL and functional mobility  Pt is safe to return home independent when medically stable  03/09/18 1039   Note Type   Note type Eval only   Restrictions/Precautions   Other Precautions Contact/isolation;Droplet precautions  (negative pressure room)   Pain Assessment   Pain Assessment 0-10   Pain Score 2   Pain Type Acute pain   Pain Location ("front and back where my shingles are ")   Hospital Pain Intervention(s) Medication (See MAR)   Response to Interventions good relief   Home Living   Type of [address] One level;Stairs to enter with rails  (5 JESS with bilateral rails)   Bathroom Shower/Tub Tub/shower unit   [employer] Raised   Bathroom Equipment Hand-held shower;Grab bars around toilet   [address] Cane;Walker   Prior Function   Level of [place] Independent with ADLs and functional mobility   Lives With Alone   Receives Help From Family   ADL Assistance Independent   IADLs Independent   Falls in the last 6 months 0   Vocational Retired   Comments Independent ambulation community distances without AD PTA  Pt still drives and goes out frequently     Psychosocial   Psychosocial (WDL) WDL   Subjective   Subjective "I'm good "   ADL   Where Assessed Edge of bed   Eating Assistance 7  Independent   Grooming Assistance 7  Independent   UB Bathing Assistance 7  Independent   LB Bathing Assistance 7  Independent   UB Dressing Assistance 7  Independent   LB Dressing Assistance 7  Independent   Toileting Assistance  7  Independent   Bed Mobility   Supine to Sit 7  Independent   Sit to Supine 7  Independent   Transfers   Sit to Stand 7  Independent   Stand to Sit 7  Independent   Stand pivot 7  Independent   Functional Mobility   Functional Mobility 7  Independent   Additional Comments in negative pressure room   Balance   Static Sitting Normal Dynamic Sitting Normal   Static Standing Normal   Dynamic Standing Good   Ambulatory Good   Activity Tolerance   Activity Tolerance Patient tolerated treatment well   RUE Assessment   RUE Assessment WFL   LUE Assessment   LUE Assessment WFL   Hand Function   Gross Motor Coordination Functional   Fine Motor Coordination Functional   Vision-Basic Assessment   Current Vision Wears glasses for distance only   Cognition   Overall Cognitive Status WFL   Assessment   Limitation Decreased endurance   Prognosis Good   Assessment Patient evaluated by Occupational Therapy  [facility] and [facility]igles  The patients occupational profile, medical and therapy history includes a brief history with review of medical and/or therapy records related to the presenting problem  Comorbidities affecting functional mobility and ADLS include: asthma, diabetes, hypertension, hyperlipidemia and obesity  Prior to admission, patient was independent with functional mobility without assistive device, independent with ADLS, independent with IADLS, living alone in 1 story home with 5 steps to enter, ambulating community distances and retired  The evaluation identifies the following performance deficits: decreased endurance and pain, that result in activity limitations and/or participation restrictions  This evaluation requires clinical decision making of low complexity, because the patients presents with no comorbidities that affect occupational performance and required no modification of tasks or assistance with consideration of a limited number of treatment options  The Barthel Index was used as a functional outcome tool presenting with a score of 100, indicating no limitations of functional mobility and ADLS  Patient is not in need of skilled OT or PT at this time  Pt is safe to return home independent when medically stable      Goals   Patient Goals n/a   Plan   OT Frequency Eval only Recommendation   OT Discharge Recommendation Home independent   Barthel Index   Feeding 10   Bathing 5   Grooming Score 5   Dressing Score 10   Bladder Score 10   Bowels Score 10   Toilet Use Score 10   Transfers (Bed/Chair) Score 15   Mobility (Level Surface) Score 15   Stairs Score 10   Barthel Index Score 100

## 2018-03-09 NOTE — PHYSICIAN ADVISOR
Current patient class: Inpatient  The patient is currently on Hospital Day: 2      The patient was admitted to the hospital at [phone] on 3/8/18 for the following diagnosis:  Palpitations [R00 2]  Shingles [B02 9]  Rash [R21]  SVT (supraventricular tachycardia) (HCC) [I47 1]  Tachycardia [R00 0]       There is documentation in the medical record of an expected length of stay of at least 2 midnights  The patient is therefore expected to satisfy the 2 midnight benchmark and given the 2 midnight presumption is appropriate for INPATIENT ADMISSION  Given this expectation of a satisfying stay, CMS instructs us that the patient is most often appropriate for inpatient admission under part A provided medical necessity is documented in the chart  After review of the relevant documentation, labs, vital signs and test results, the patient is appropriate for INPATIENT ADMISSION  Admission to the hospital as an inpatient is a complex decision making process which requires the practitioner to consider the patients presenting complaint, history and physical examination and all relevant testing  With this in mind, in this case, the patient was deemed appropriate for INPATIENT ADMISSION  After review of the documentation and testing available at the time of the admission I concur with this clinical determination of medical necessity  Rationale is as follows: The patient is a 74 yrs old Female who presented to the ED at 3/8/2018 11:34 AM with a chief complaint of Rash (Pt sts rash for 2 days under left breast and around to back  Red, raised rash under left breast radiating around to back  Also c/o headache and nausea) patient continues to remain hospitalized for arrhythmia     Patient is noted to have documented need for at least a 2 midnight length of stay, the patient is expected to remain hospitalized and satisfy the benchmark  Given this, the patient is appropriate for inpatient admission      The patients vitals on arrival were ED Triage Vitals   Temperature Pulse Respirations Blood Pressure SpO2   03/08/18 1142 03/08/18 1142 03/08/18 1142 03/08/18 1142 03/08/18 1142   98 6 °F (37 °C) (!) 132 20 (!) 190/91 98 %      Temp Source Heart Rate Source Patient Position - Orthostatic VS BP Location FiO2 (%)   03/08/18 1706 03/08/18 1443 03/08/18 1348 03/08/18 1348 --   Oral Monitor Lying Right arm       Pain Score       03/08/18 1142       8           Past Medical History:   Diagnosis Date    Asthma     Diabetes mellitus ([address] )     GERD (gastroesophageal reflux disease)     Hyperlipidemia     Hypertension     Kidney stones     PONV (postoperative nausea and vomiting)      Past Surgical History:   Procedure Laterality Date    APPENDECTOMY      CYSTOSCOPY W/ LASER LITHOTRIPSY Left 7/12/2016    Procedure: CYSTOSCOPY URETEROSCOPY WITH LITHOTRIPSY HOLMIUM LASER, RETROGRADE PYELOGRAM AND INSERTION STENT URETERAL;  Surgeon: Francine Garcia MD;  Location: [address] [place] [address];  Service:    [facility] DILATION AND CURETTAGE OF UTERUS      JOINT REPLACEMENT      right knee    KNEE ARTHROPLASTY Right     MI CYSTOURETHROSCOPY,URETER CATHETER Left 6/29/2016    Procedure: CYSTOSCOPY RETROGRADE PYELOGRAM WITH INSERTION STENT URETERAL, left;  Surgeon: Francine Garcia MD;  Location[facility];  Service: Urology    SHOULDER ARTHROTOMY Left            Consults have been placed to:   IP CONSULT TO CARDIOLOGY    Vitals:    03/09/18 0041 03/09/18 0533 03/09/18 0839 03/09/18 1300   BP: 147/70 145/65 130/60 126/65   BP Location: Right arm Left arm Left arm Left arm   Pulse: 104 103 (!) 119 (!) 108   Resp: 20 20 20 18   Temp: 99 7 °F (37 6 °C) 99 9 °F (37 7 °C) 100 2 °F (37 9 °C) 99 6 °F (37 6 °C)   TempSrc: Tympanic Tympanic Oral Oral   SpO2: 97% 97% 93%    Weight:       Height:           Most recent labs:    Recent Labs      03/08/18   1231  03/09/18   0548   WBC  8 70  7 50   HGB  14 8  13 1   HCT  44 5  40 1   PLT  230  192   K  4 5  3 8   NA  136  137 CALCIUM  9 8  8 8   BUN  24  26*   CREATININE  1 22  1 30   TROPONINI  <0 02   --    CKTOTAL  172   --    AST  25   --    ALT  26   --    ALKPHOS  64   --    BILITOT  0 40   --        Scheduled Meds:  Current Facility-Administered Medications:  acetaminophen 650 mg Oral Q6H PRN Bhavana Glasgow MD    acyclovir 10 mg/kg (Ideal) Intravenous Q8H Bhavana Glasgow MD Last Rate: 500 mg (03/09/18 1323)   albuterol 2 puff Inhalation Q6H PRN Bhavana Glasgow MD    aspirin 81 mg Oral Daily Bhavana Glasgow MD    atorvastatin 40 mg Oral Daily With Vara MD [facility]    enoxaparin 40 mg Subcutaneous Daily Bhavana Glasgow MD    famotidine 20 mg Oral Daily Bhavana Glasgow MD    fluticasone-salmeterol 2 puff Inhalation Q12H 3630 Corey Veras MD    gabapentin 100 mg Oral TID Bhavana Glasgow MD    insulin glargine 40 Units Subcutaneous Q12H 3630 Corey Veras MD    insulin lispro 1-5 Units Subcutaneous HS Bhavana Glasgow MD    insulin lispro 2-12 Units Subcutaneous TID AC Bhavana Glasgow MD    lidocaine 2 patch Transdermal Daily Bhavana Glasgow MD    lisinopril 10 mg Oral Daily Bhavana Glasgow MD    montelukast 10 mg Oral HS Bhavana Glasgow MD    ondansetron 4 mg Intravenous Q6H PRN Bhavana Glasgow MD    torsemide 10 mg Oral Daily Bhavana Glasgow MD      Continuous Infusions:   PRN Meds:   acetaminophen    albuterol    ondansetron    Surgical procedures (if appropriate):

## 2018-03-09 NOTE — PLAN OF CARE
Problem: OCCUPATIONAL THERAPY ADULT  Goal: Performs self-care activities at highest level of function for planned discharge setting  See evaluation for individualized goals  Outcome: Completed Date Met: 03/09/18  Limitation: Decreased endurance  Prognosis: Good  Assessment: Patient evaluated by Occupational Therapy  Patient [facility] and elvira  The patients occupational profile, medical and therapy history includes a brief history with review of medical and/or therapy records related to the presenting problem  Comorbidities affecting functional mobility and ADLS include: asthma, diabetes, hypertension, hyperlipidemia and obesity  Prior to admission, patient was independent with functional mobility without assistive device, independent with ADLS, independent with IADLS, living alone in 1 story home with 5 steps to enter, ambulating community distances and retired  The evaluation identifies the following performance deficits: decreased endurance and pain, that result in activity limitations and/or participation restrictions  This evaluation requires clinical decision making of low complexity, because the patients presents with no comorbidities that affect occupational performance and required no modification of tasks or assistance with consideration of a limited number of treatment options  The Barthel Index was used as a functional outcome tool presenting with a score of 100, indicating no limitations of functional mobility and ADLS  Patient is not in need of skilled OT or PT at this time  Pt is safe to return home independent when medically stable        OT Discharge Recommendation: Home independent

## 2018-03-09 NOTE — ASSESSMENT & PLAN NOTE
-Patients stated on acyclovir  -will add Neurontin and lidocaine patch  -pain improved with current treatment

## 2018-03-10 PROBLEM — A41.9 SEPSIS (HCC): Status: ACTIVE | Noted: 2018-03-10

## 2018-03-10 PROBLEM — R52 INTRACTABLE PAIN: Status: RESOLVED | Noted: 2018-03-08 | Resolved: 2018-03-10

## 2018-03-10 LAB
ALBUMIN SERPL BCP-MCNC: 2.9 G/DL (ref 3.5–5)
ALP SERPL-CCNC: 49 U/L (ref 46–116)
ALT SERPL W P-5'-P-CCNC: 26 U/L (ref 12–78)
ANION GAP SERPL CALCULATED.3IONS-SCNC: 11 MMOL/L (ref 4–13)
AST SERPL W P-5'-P-CCNC: 32 U/L (ref 5–45)
BACTERIA UR QL AUTO: ABNORMAL /HPF
BASOPHILS # BLD AUTO: 0.1 THOUSANDS/ΜL (ref 0–0.1)
BASOPHILS NFR BLD AUTO: 1 % (ref 0–1)
BILIRUB SERPL-MCNC: 0.5 MG/DL (ref 0.2–1)
BILIRUB UR QL STRIP: NEGATIVE
BUN SERPL-MCNC: 25 MG/DL (ref 5–25)
CALCIUM SERPL-MCNC: 8 MG/DL (ref 8.3–10.1)
CHLORIDE SERPL-SCNC: 100 MMOL/L (ref 100–108)
CLARITY UR: CLEAR
CO2 SERPL-SCNC: 25 MMOL/L (ref 21–32)
COLOR UR: ABNORMAL
CREAT SERPL-MCNC: 1.3 MG/DL (ref 0.6–1.3)
EOSINOPHIL # BLD AUTO: 0.1 THOUSAND/ΜL (ref 0–0.61)
EOSINOPHIL NFR BLD AUTO: 2 % (ref 0–6)
ERYTHROCYTE [DISTWIDTH] IN BLOOD BY AUTOMATED COUNT: 13.9 % (ref 11.6–15.1)
GFR SERPL CREATININE-BSD FRML MDRD: 43 ML/MIN/1.73SQ M
GLUCOSE SERPL-MCNC: 144 MG/DL (ref 65–140)
GLUCOSE SERPL-MCNC: 159 MG/DL (ref 65–140)
GLUCOSE SERPL-MCNC: 181 MG/DL (ref 65–140)
GLUCOSE SERPL-MCNC: 203 MG/DL (ref 65–140)
GLUCOSE SERPL-MCNC: 95 MG/DL (ref 65–140)
GLUCOSE UR STRIP-MCNC: NEGATIVE MG/DL
HCT VFR BLD AUTO: 38.3 % (ref 37–47)
HGB BLD-MCNC: 12.7 G/DL (ref 12–16)
HGB UR QL STRIP.AUTO: NEGATIVE
KETONES UR STRIP-MCNC: NEGATIVE MG/DL
LACTATE SERPL-SCNC: 1.1 MMOL/L (ref 0.5–2)
LEUKOCYTE ESTERASE UR QL STRIP: ABNORMAL
LYMPHOCYTES # BLD AUTO: 1.4 THOUSANDS/ΜL (ref 0.6–4.47)
LYMPHOCYTES NFR BLD AUTO: 22 % (ref 14–44)
MCH RBC QN AUTO: 29.2 PG (ref 27–31)
MCHC RBC AUTO-ENTMCNC: 33.2 G/DL (ref 31.4–37.4)
MCV RBC AUTO: 88 FL (ref 82–98)
MONOCYTES # BLD AUTO: 0.8 THOUSAND/ΜL (ref 0.17–1.22)
MONOCYTES NFR BLD AUTO: 13 % (ref 4–12)
MRSA NOSE QL CULT: NORMAL
NEUTROPHILS # BLD AUTO: 4.1 THOUSANDS/ΜL (ref 1.85–7.62)
NEUTS SEG NFR BLD AUTO: 63 % (ref 43–75)
NITRITE UR QL STRIP: NEGATIVE
NON-SQ EPI CELLS URNS QL MICRO: ABNORMAL /HPF
NRBC BLD AUTO-RTO: 0 /100 WBCS
PH UR STRIP.AUTO: 5 [PH] (ref 5–9)
PLATELET # BLD AUTO: 168 THOUSANDS/UL (ref 130–400)
PMV BLD AUTO: 7.8 FL (ref 8.9–12.7)
POTASSIUM SERPL-SCNC: 4.4 MMOL/L (ref 3.5–5.3)
PROT SERPL-MCNC: 6.7 G/DL (ref 6.4–8.2)
PROT UR STRIP-MCNC: NEGATIVE MG/DL
RBC # BLD AUTO: 4.36 MILLION/UL (ref 4.2–5.4)
RBC #/AREA URNS AUTO: ABNORMAL /HPF
SODIUM SERPL-SCNC: 136 MMOL/L (ref 136–145)
SP GR UR STRIP.AUTO: 1.01 (ref 1–1.03)
UROBILINOGEN UR QL STRIP.AUTO: 0.2 E.U./DL
WBC # BLD AUTO: 6.5 THOUSAND/UL (ref 4.8–10.8)
WBC #/AREA URNS AUTO: ABNORMAL /HPF

## 2018-03-10 PROCEDURE — 99232 SBSQ HOSP IP/OBS MODERATE 35: CPT | Performed by: STUDENT IN AN ORGANIZED HEALTH CARE EDUCATION/TRAINING PROGRAM

## 2018-03-10 PROCEDURE — 80053 COMPREHEN METABOLIC PANEL: CPT | Performed by: STUDENT IN AN ORGANIZED HEALTH CARE EDUCATION/TRAINING PROGRAM

## 2018-03-10 PROCEDURE — 82948 REAGENT STRIP/BLOOD GLUCOSE: CPT

## 2018-03-10 PROCEDURE — 83605 ASSAY OF LACTIC ACID: CPT | Performed by: STUDENT IN AN ORGANIZED HEALTH CARE EDUCATION/TRAINING PROGRAM

## 2018-03-10 PROCEDURE — 99232 SBSQ HOSP IP/OBS MODERATE 35: CPT | Performed by: INTERNAL MEDICINE

## 2018-03-10 PROCEDURE — 85025 COMPLETE CBC W/AUTO DIFF WBC: CPT | Performed by: STUDENT IN AN ORGANIZED HEALTH CARE EDUCATION/TRAINING PROGRAM

## 2018-03-10 PROCEDURE — 84145 PROCALCITONIN (PCT): CPT | Performed by: STUDENT IN AN ORGANIZED HEALTH CARE EDUCATION/TRAINING PROGRAM

## 2018-03-10 PROCEDURE — 81001 URINALYSIS AUTO W/SCOPE: CPT | Performed by: STUDENT IN AN ORGANIZED HEALTH CARE EDUCATION/TRAINING PROGRAM

## 2018-03-10 RX ADMIN — ACYCLOVIR SODIUM 500 MG: 50 INJECTION, SOLUTION INTRAVENOUS at 05:47

## 2018-03-10 RX ADMIN — INSULIN LISPRO 2 UNITS: 100 INJECTION, SOLUTION INTRAVENOUS; SUBCUTANEOUS at 17:12

## 2018-03-10 RX ADMIN — LISINOPRIL 10 MG: 10 TABLET ORAL at 08:48

## 2018-03-10 RX ADMIN — GABAPENTIN 100 MG: 100 CAPSULE ORAL at 08:48

## 2018-03-10 RX ADMIN — GABAPENTIN 100 MG: 100 CAPSULE ORAL at 22:13

## 2018-03-10 RX ADMIN — ENOXAPARIN SODIUM 40 MG: 40 INJECTION SUBCUTANEOUS at 08:46

## 2018-03-10 RX ADMIN — ACYCLOVIR SODIUM 500 MG: 50 INJECTION, SOLUTION INTRAVENOUS at 22:25

## 2018-03-10 RX ADMIN — MONTELUKAST SODIUM 10 MG: 10 TABLET, COATED ORAL at 22:18

## 2018-03-10 RX ADMIN — FAMOTIDINE 20 MG: 20 TABLET, FILM COATED ORAL at 08:49

## 2018-03-10 RX ADMIN — LIDOCAINE 2 PATCH: 50 PATCH TOPICAL at 08:47

## 2018-03-10 RX ADMIN — INSULIN GLARGINE 40 UNITS: 100 INJECTION, SOLUTION SUBCUTANEOUS at 22:14

## 2018-03-10 RX ADMIN — ASPIRIN 81 MG: 81 TABLET, COATED ORAL at 08:48

## 2018-03-10 RX ADMIN — INSULIN GLARGINE 40 UNITS: 100 INJECTION, SOLUTION SUBCUTANEOUS at 08:49

## 2018-03-10 RX ADMIN — ACYCLOVIR SODIUM 500 MG: 50 INJECTION, SOLUTION INTRAVENOUS at 13:54

## 2018-03-10 RX ADMIN — ACETAMINOPHEN 650 MG: 325 TABLET, FILM COATED ORAL at 22:11

## 2018-03-10 RX ADMIN — METOPROLOL TARTRATE 50 MG: 50 TABLET ORAL at 22:20

## 2018-03-10 RX ADMIN — GABAPENTIN 100 MG: 100 CAPSULE ORAL at 17:11

## 2018-03-10 RX ADMIN — ONDANSETRON 4 MG: 2 INJECTION INTRAMUSCULAR; INTRAVENOUS at 01:54

## 2018-03-10 RX ADMIN — FLUTICASONE PROPIONATE AND SALMETEROL 2 PUFF: 50; 100 POWDER RESPIRATORY (INHALATION) at 08:49

## 2018-03-10 RX ADMIN — TORSEMIDE 10 MG: 10 TABLET ORAL at 08:48

## 2018-03-10 RX ADMIN — ATORVASTATIN CALCIUM 40 MG: 40 TABLET, FILM COATED ORAL at 17:11

## 2018-03-10 RX ADMIN — METOPROLOL TARTRATE 50 MG: 50 TABLET ORAL at 08:49

## 2018-03-10 RX ADMIN — ACETAMINOPHEN 650 MG: 325 TABLET, FILM COATED ORAL at 12:12

## 2018-03-10 RX ADMIN — INSULIN LISPRO 1 UNITS: 100 INJECTION, SOLUTION INTRAVENOUS; SUBCUTANEOUS at 22:21

## 2018-03-10 NOTE — SEPSIS NOTE
Sepsis Note   Stephanie Valencia 74 y o  female MRN: [id number]  Unit/Bed#: [address] [place] [address] Encounter: [id number]            Initial Sepsis Screening     [address] Name 03/10/18 1136                Is the patient's history suggestive of a new or worsening infection? (!)  Yes (Proceed)  -HS        Suspected source of infection suspect infection, source unknown  -HS        Are two or more of the following signs & symptoms of infection both present and new to the patient?         Indicate SIRS criteria Hyperthemia > 38 3C (100 9F); Tachycardia > 90 bpm  -HS        If the answer is yes to both questions, suspicion of sepsis is present          If severe sepsis is present AND tissue hypoperfusion perists in the hour after fluid resuscitation or lactate > 4, the patient meets criteria for SEPTIC SHOCK          Are any of the following organ dysfunction criteria present within 6 hours of suspected infection and SIRS criteria that are NOT considered to be chronic conditions? No  -HS        Organ dysfunction          Date of presentation of severe sepsis          Time of presentation of severe sepsis          Tissue hypoperfusion persists in the hour after crystalloid fluid administration, evidenced, by either:          Was hypotension present within one hour of the conclusion of crystalloid fluid administration?         Date of presentation of septic shock          Time of presentation of septic shock            User Key  (r) = Recorded By, (t) = Taken By, (c) = Cosigned By    [address] Name Provider Type    [address] MD Frank Physician          Lactic acid ordered  UA ordered  CXR done 2 days ago nml  Patient has shingles but doesn't look like she has overlying cellulitis, no complaints including cough, sob, abd pain, diarrhea   Had some nausea without   procalcitonin ordered

## 2018-03-10 NOTE — PLAN OF CARE
CARDIOVASCULAR - ADULT     Maintains optimal cardiac output and hemodynamic stability Progressing     Absence of cardiac dysrhythmias or at baseline rhythm Progressing        DISCHARGE PLANNING - CARE MANAGEMENT     Discharge to post-acute care or home with appropriate resources Progressing        INFECTION - ADULT     Absence or prevention of progression during hospitalization Progressing        PAIN - ADULT     Verbalizes/displays adequate comfort level or baseline comfort level Progressing        SKIN/TISSUE INTEGRITY - ADULT     Skin integrity remains intact Progressing     Incision(s), wounds(s) or drain site(s) healing without S/S of infection Progressing

## 2018-03-10 NOTE — ASSESSMENT & PLAN NOTE
-began on day 2 of admission, as evidence by fever and tachycardia    -Source possibly herpes zoster but fever was quite high  Patient has no respiratory, GI or  complaints  -CXR on arrival showed NAD  -UA showed small leukocytes  -lactic acid 1 1  -procalcitonin pending on discharge  -ID consulted, recommend monitoring on acyclovir   No other source of infection was found for the fevers, so it was attributed to shingles    -she was discharged home once fever trend improved

## 2018-03-10 NOTE — PROGRESS NOTES
Progress Note - Cardiology   Kole Douse 74 y o  female MRN: [id number]  Unit/Bed#: [address] [place], [place] [id number] Encounter: [id number]    Assessment/Plan:  Paroxysmal SVT-   metoprolol 37 5mg bid  Brief run at 1:30pm  Short burst  Secondary to sleep apnea? Obesity/hypoxia? Recommend sleep screen as outpatient  Event monitor  Morbid obesity  Possible sleep apnea  Prior smoking  Possible shingles/intractable pain  Type 2 diabetes mellitus on long-term insulin  The hyperlipidemia    Subjective/Objective   Still feels tachycardia  Febrile today    Objective:     Vitals: /55 (BP Location: Left arm)   Pulse (!) 109   Temp (!) 102 2 °F (39 °C) (Oral)   Resp 18   Ht 5' 2" (1 575 m)   Wt 118 kg (260 lb 2 3 oz)   SpO2 96%   BMI 47 58 kg/m²   Vitals:    03/08/18 1142 03/08/18 1706   Weight: 116 kg (256 lb) 118 kg (260 lb 2 3 oz)     Orthostatic Blood Pressures    Flowsheet Row Most Recent Value   Blood Pressure  122/55 filed at 03/09/2018 1639   Patient Position - Orthostatic VS  Lying filed at 03/09/2018 1639            Intake/Output Summary (Last 24 hours) at 03/09/18 1929  Last data filed at 03/09/18 1257   Gross per 24 hour   Intake              720 ml   Output              700 ml   Net               20 ml       Invasive Devices     Peripheral Intravenous Line            Peripheral IV 03/08/18 Left Arm 1 day                Review of Systems: reviewed    Physical Exam   Constitutional: She is oriented to person, place, and time  No distress  Morbid obesity   HENT:   Head: Normocephalic and atraumatic  Right Ear: External ear normal    Left Ear: External ear normal    Eyes: Conjunctivae are normal  Pupils are equal, round, and reactive to light  Right eye exhibits no discharge  Left eye exhibits no discharge  No scleral icterus  Neck: Normal range of motion  Neck supple  No JVD present  No tracheal deviation present  No thyromegaly present  Cardiovascular: Normal rate and regular rhythm  Exam reveals gallop   Exam reveals no friction rub  No murmur heard  Pulmonary/Chest: Effort normal and breath sounds normal  No stridor  No respiratory distress  She has no wheezes  She has no rales  She exhibits no tenderness  Abdominal: Soft  Bowel sounds are normal  She exhibits no distension and no mass  There is no tenderness  There is no rebound and no guarding  Musculoskeletal: Normal range of motion  She exhibits no edema, tenderness or deformity  Neurological: She is alert and oriented to person, place, and time  She has normal reflexes  No cranial nerve deficit  She exhibits normal muscle tone  Coordination normal    Skin: Skin is warm and dry  Rash noted  She is not diaphoretic  There is erythema  No pallor  Psychiatric: She has a normal mood and affect  Her behavior is normal  Judgment and thought content normal    Nursing note and vitals reviewed  Lab Results: I have personally reviewed pertinent lab results  Imaging: I have personally reviewed pertinent reports  EKG: reviewed  VTE Pharmacologic Prophylaxis: Sequential compression device (Venodyne)   VTE Mechanical Prophylaxis: sequential compression device    Counseling / Coordination of Care  Total time spent today 40 minutes  Greater than 50% of total time was spent with the patient and / or family counseling and / or coordination of care   A description of the counseling / coordination of care: svt

## 2018-03-10 NOTE — PROGRESS NOTES
Progress Note - Joanne Blunt 1950, 74 y o  female MRN: [id number]    Unit/Bed#: [address] [place] [address] Encounter: [id number]    Primary Care Provider: Dario Ross   Date and time admitted to hospital: 3/8/2018 11:34 AM        Sepsis [facility])   Assessment & Plan    -began on day 2 of admission, as evidence by fever and tachycardia    -Source possibly herpes zoster but fever is quite high  Patient has no respiratory, GI or  complaints  -CXR on arrival showed NAD  -UA showed small leukocytes  -lactic acid 1 1  -procalcitonin pending  -ID consulted, recommend monitoring on acyclovir  No Abx for now          * SVT (supraventricular tachycardia) ([facility])   Assessment & Plan    -noted on tele, short run of SVT  -EKG was done and showed sinus tachy at 120  -patient with hx palpitations, was supposed to see cardio for holter monitor but has not had it done yet  -consulted cardio  -placed on tele, sinus tachy to low 100s  -TSH 3 038  -treat pain from shingles  -started low dose metoprolol  -will need outpt cardio follow up        Shingles outbreak   Assessment & Plan    -Patients stated on acyclovir  -will add Neurontin and lidocaine patch  -pain improved with current treatment  -contact precautions        Intractable painresolved as of 3/10/2018   Assessment & Plan    -2/2 shingles  -treat as noted, improved        Uncontrolled diabetes mellitus type 2 without complications ([facility])   Assessment & Plan    -HA1c 8 4  -cont with patients home glargine, place on ISS  -diabetic diet        Morbid obesity with BMI of 45 0-49 9, adult ([facility] [address] )   Assessment & Plan    -advised diet with loss  -Nutrition consult        Asthma   Assessment & Plan    -no exacerbation  -cont with home meds            VTE Pharmacologic Prophylaxis:   Pharmacologic: Enoxaparin (Lovenox)  Mechanical VTE Prophylaxis in Place: Yes    Patient Centered Rounds: I have performed bedside rounds with nursing staff today      Discussions with Specialists or Other Care Team Provider: Yes Cardio    Education and Discussions with Family / Patient:Yes    Time Spent for Care: 30 minutes  More than 50% of total time spent on counseling and coordination of care as described above  Current Length of Stay: 2 day(s)    Current Patient Status: Inpatient     Discharge Plan: home tomorrow if HR better    Code Status: Level 1 - Full Code      Subjective:   Yolanda pain is well controlled  She had some nausea overnight but she also received multiple doses of tylenol and it sometimes causes GI upset with her  No CP, SOB, palpitations, cough  No abd pain, emesis or diarrhea  No LE pain  No dysuria, hematuria, frequency urgency change in color or decreased urine outpt  Has occasional HA when she has a fever, and feels flushed, but otherwise feels pretty good  Objective:       Vitals:   Temp (24hrs), Av 4 °F (38 6 °C), Min:99 6 °F (37 6 °C), Max:103 1 °F (39 5 °C)    HR:  [] 91  Resp:  [18-20] 20  BP: (102-151)/(49-70) 104/55  SpO2:  [92 %-99 %] 92 %  Body mass index is 47 58 kg/m²  Input and Output Summary (last 24 hours): Intake/Output Summary (Last 24 hours) at 03/10/18 1601  Last data filed at 03/10/18 0934   Gross per 24 hour   Intake              480 ml   Output             1050 ml   Net             -570 ml       Physical Exam:     Physical Exam   Constitutional: She is oriented to person, place, and time  She appears well-developed  No distress  HENT:   Head: Normocephalic and atraumatic  Cardiovascular: Normal rate, regular rhythm and normal heart sounds  Pulmonary/Chest: Effort normal and breath sounds normal  No respiratory distress  She has no wheezes  She has no rales  She exhibits no tenderness  Abdominal: Soft  Bowel sounds are normal  She exhibits no distension  There is no tenderness  There is no rebound and no guarding  Musculoskeletal: She exhibits no edema, tenderness or deformity     Neurological: She is alert and oriented to person, place, and time  Skin: Skin is warm and dry  Face is flushed  Vesicular rash in a dermatomal pattern under the left breast fold  Lidocaine patches over parts of the rash   Psychiatric: She has a normal mood and affect  Her behavior is normal    Nursing note and vitals reviewed  Additional Data:     Labs:      Results from last 7 days  Lab Units 03/10/18  0909   WBC Thousand/uL 6 50   HEMOGLOBIN g/dL 12 7   HEMATOCRIT % 38 3   PLATELETS Thousands/uL 168   NEUTROS PCT % 63   LYMPHS PCT % 22   MONOS PCT % 13*   EOS PCT % 2       Results from last 7 days  Lab Units 03/10/18  0909   SODIUM mmol/L 136   POTASSIUM mmol/L 4 4   CHLORIDE mmol/L 100   CO2 mmol/L 25   BUN mg/dL 25   CREATININE mg/dL 1 30   CALCIUM mg/dL 8 0*   TOTAL PROTEIN g/dL 6 7   BILIRUBIN TOTAL mg/dL 0 50   ALK PHOS U/L 49   ALT U/L 26   AST U/L 32   GLUCOSE RANDOM mg/dL 159*           * I Have Reviewed All Lab Data Listed Above  * Additional Pertinent Lab Tests Reviewed: All Labs For Current Hospital Admission Reviewed    Imaging:  Xr Chest 1 View Portable    Result Date: 3/8/2018  Narrative: CHEST INDICATION: 68-year-old female with dyspnea, vomiting, tachycardia  COMPARISON:  2/13/2015 EXAM PERFORMED/VIEWS:  XR CHEST PORTABLE Images: 1 FINDINGS: Cardiomediastinal silhouette appears unremarkable  The lungs are clear  No pneumothorax or pleural effusion  Osseous structures appear within normal limits for patient age  Impression: No acute cardiopulmonary disease   Workstation performed: [id number]     Imaging Reports Reviewed by myself    Cultures:   Blood Culture:   Lab Results   Component Value Date    BLOODCX No Growth at 24 hrs  03/08/2018    BLOODCX No Growth at 24 hrs  03/08/2018     Urine Culture:   Lab Results   Component Value Date    URINECX  11/30/2016     50,000-59,000 cfu/ml beta hemolytic Streptococcus Group B    URINECX <10,000 cfu/ml Mixed Contaminants X2 11/30/2016    URINECX  06/29/2016     30,000-39,000 cfu/ml beta hemolytic Streptococcus Group B    URINECX 40,000-49,000 cfu/ml Mixed Contaminants X3 06/29/2016     Sputum Culture: No components found for: SPUTUMCX  Wound Culture: No results found for: WOUNDCULT    Last 24 Hours Medication List:     Current Facility-Administered Medications:  acetaminophen 650 mg Oral Q6H PRN Charlie Rodriguez MD    acyclovir 10 mg/kg (Ideal) Intravenous Ken Rojas MD Last Rate: 500 mg (03/10/18 1354)   albuterol 2 puff Inhalation Q6H PRN Charlie Rodriguez MD    aspirin 81 mg Oral Daily Charlie Rodriguez MD    atorvastatin 40 mg Oral Daily With Leata Home, MD    enoxaparin 40 mg Subcutaneous Daily Charlie Rodriguez MD    famotidine 20 mg Oral Daily Charlie Rodriguez MD    fluticasone-salmeterol 2 puff Inhalation Q12H 3630 Corey Veras MD    gabapentin 100 mg Oral TID Charlie Rodriguez MD    insulin glargine 40 Units Subcutaneous Q12H 3630 Corey Veras, MD    insulin lispro 1-5 Units Subcutaneous HS Charlie Rodriguez MD    insulin lispro 2-12 Units Subcutaneous TID AC Charlie Rodriguez MD    lidocaine 2 patch Transdermal Daily Charlie Rodriguez MD    lisinopril 10 mg Oral Daily Charlie Rodriguez MD    metoprolol tartrate 50 mg Oral Q12H Homero Soler MD    montelukast 10 mg Oral HS Charlie Rodriguez MD    ondansetron 4 mg Intravenous Q6H PRN Charlie Rodriguez MD    torsemide 10 mg Oral Daily Charlie Rodriguez MD         Today, Patient Was Seen By: Charlie Rodriguez MD    ** Please Note: Dragon 360 Dictation voice to text software may have been used in the creation of this document   **

## 2018-03-10 NOTE — ASSESSMENT & PLAN NOTE
-Patients stated on acyclovir, Neurontin and lidocaine patch  -pain improved with treatment  -contact precautions  Discharged home with contact precaution instructions

## 2018-03-10 NOTE — ASSESSMENT & PLAN NOTE
-on arrival, noted on tele, short run of SVT   EKG was done and showed sinus tachy at 120  -patient with hx palpitations, was supposed to see cardio for holter monitor but has not had it done yet  -consulted cardio  -placed on tele, sinus tachy to low 100s  -TSH 3 038  -treated pain from shingles  -started low dose metoprolol with improvement of HR  -will need outpt cardio follow up after discharge  -will also need sleep study as outpt given obesity

## 2018-03-10 NOTE — CONSULTS
Consultation - Infectious Disease   Padmini Ross 74 y o  female MRN: [id number]  Unit/Bed#: [address] [place] [address] Encounter: [id number]    Assessment:  Herpes zoster left 7th thoracic dermatome  Plan:  Observe on intravenous acyclovir      History of Present Illness   Physician Requesting Consult: Ana María Carrasco MD  Reason for Consult / Principal Problem:   Herpes zosterConsult / Principal Problem:  Hx and PE limited by:   HPI: Padmini Ross is a 74y o  year old female who presents with erythematous vesicular  pustular rash on left posterior chest and going around lateral left chest wall and extending all the way up to the midline  The rash approximately occupies the 7th left thoracic dermatome  The patient had high fevers which seem to be responding to acyclovir, and pain in lateral left chest   She also had runs of paroxysmal supraventricular tachycardia  She is morbidly obese and has DM 2  Consults    Review of Systems   Constitutional: Positive for activity change, chills, diaphoresis, fatigue and fever  Respiratory: Positive for chest tightness and shortness of breath  Cardiovascular: Positive for palpitations  Skin: Positive for color change and rash  All other systems reviewed and are negative        Historical Information   Past Medical History:   Diagnosis Date    Asthma     Diabetes mellitus ([facility] [address] )     GERD (gastroesophageal reflux disease)     Hyperlipidemia     Hypertension     Kidney stones     PONV (postoperative nausea and vomiting)      Past Surgical History:   Procedure Laterality Date    APPENDECTOMY      CYSTOSCOPY W/ LASER LITHOTRIPSY Left 7/12/2016    Procedure: CYSTOSCOPY URETEROSCOPY WITH LITHOTRIPSY HOLMIUM LASER, RETROGRADE PYELOGRAM AND INSERTION STENT URETERAL;  Surgeon: Francine Garcia MD;  Location: [address] [place] [address];  Service:    Danial Kohler DILATION AND CURETTAGE OF UTERUS      JOINT REPLACEMENT      right knee    KNEE ARTHROPLASTY Right     AL CYSTOURETHROSCOPY,URETER CATHETER Left 6/29/2016    Procedure: CYSTOSCOPY RETROGRADE PYELOGRAM WITH INSERTION STENT URETERAL, left;  Surgeon: Maritza Allan MD;  Location: [facility];  Service: Urology    SHOULDER ARTHROTOMY Left      Social History   History   Alcohol Use    Yes     Comment: rarely     History   Drug Use No     History   Smoking Status    Former Smoker    Packs/day: 1 00    Years: 20 00    Types: Cigarettes    Quit date: 7/8/1996   Smokeless Tobacco    Never Used     Family History: History reviewed  No pertinent family history  Meds/Allergies   all current active meds have been reviewed    Allergies   Allergen Reactions    Asa [Aspirin] GI Intolerance    Indocin [Indomethacin] Other (See Comments)     Made patient "loopy"    Penicillins Hives       Objective       Intake/Output Summary (Last 24 hours) at 03/10/18 1348  Last data filed at 03/10/18 0934   Gross per 24 hour   Intake              960 ml   Output             1050 ml   Net              -90 ml       Invasive Devices:   Peripheral IV 03/10/18 Right Arm (Active)   Site Assessment Clean;Dry; Intact 3/10/2018  1:36 AM   Dressing Type Transparent 3/10/2018  1:36 AM   Line Status Blood return noted; Flushed; Infusing 3/10/2018  1:36 AM   Dressing Status Clean;Dry; Intact 3/10/2018  1:36 AM       PHYSICAL EXAM:  Vitals:    03/10/18 0037 03/10/18 0135 03/10/18 0844 03/10/18 1202   BP:   126/61 125/58   BP Location:   Left arm Left arm   Pulse:   92 94   Resp:   18 18   Temp: (!) 100 6 °F (38 1 °C) 99 6 °F (37 6 °C)  99 7 °F (37 6 °C)   TempSrc: Oral Oral Oral Oral   SpO2:   99% 95%   Weight:       Height:           Physical Exam   Constitutional: She is oriented to person, place, and time  She appears well-developed and well-nourished  Morbidly obese   HENT:   Head: Normocephalic and atraumatic  Eyes: Pupils are equal, round, and reactive to light  Neck: Neck supple  No thyromegaly present  Cardiovascular: Normal heart sounds      Pulmonary/Chest: Effort normal  No respiratory distress  Abdominal: Soft  Bowel sounds are normal  She exhibits no distension  There is no tenderness  Musculoskeletal: Normal range of motion  Neurological: She is alert and oriented to person, place, and time  Skin: Skin is warm and dry  Rash noted  There is erythema  Erythematous vesicular pustular rash occupying the left 7th thoracic dermatome   Nursing note and vitals reviewed        Lab Results: CBC: Lab Results   Component Value Date    WBC 6 50 03/10/2018    WBC 7 50 03/09/2018    WBC 8 70 03/08/2018    WBC 10 90 (H) 11/30/2016    WBC 10 70 06/30/2016    WBC 13 30 (H) 06/29/2016    RBC 4 36 03/10/2018    RBC 4 55 03/09/2018    RBC 5 12 03/08/2018    RBC 4 67 11/30/2016    RBC 3 91 (L) 06/30/2016    RBC 4 58 06/29/2016       CMP: Lab Results   Component Value Date     03/10/2018     03/09/2018     03/08/2018     11/30/2016     (L) 06/30/2016     06/30/2016     06/29/2016     03/10/2018     03/09/2018    CL 98 (L) 03/08/2018     11/30/2016    CL 99 (L) 06/30/2016     06/30/2016     06/29/2016    CO2 25 03/10/2018    CO2 29 03/09/2018    CO2 28 03/08/2018    CO2 29 11/30/2016    CO2 28 06/30/2016    CO2 30 06/30/2016    CO2 26 06/29/2016    ANIONGAP 11 03/10/2018    ANIONGAP 8 03/09/2018    ANIONGAP 10 03/08/2018    ANIONGAP 9 11/30/2016    ANIONGAP 7 06/30/2016    ANIONGAP 6 06/30/2016    ANIONGAP 11 06/29/2016    BUN 25 03/10/2018    BUN 26 (H) 03/09/2018    BUN 24 03/08/2018    BUN 16 11/30/2016    BUN 18 06/30/2016    BUN 20 06/30/2016    BUN 19 06/29/2016    CREATININE 1 30 03/10/2018    CREATININE 1 30 03/09/2018    CREATININE 1 22 03/08/2018    CREATININE 1 24 11/30/2016    CREATININE 1 18 06/30/2016    CREATININE 1 31 (H) 06/30/2016    CREATININE 1 12 06/29/2016    GLUCOSE 159 (H) 03/10/2018    GLUCOSE 118 03/09/2018    GLUCOSE 240 (H) 03/08/2018    GLUCOSE 151 (H) 11/30/2016    GLUCOSE 254 (H) 06/30/2016 GLUCOSE 183 (H) 06/30/2016    GLUCOSE 184 (H) 06/29/2016    CALCIUM 8 0 (L) 03/10/2018    CALCIUM 8 8 03/09/2018    CALCIUM 9 8 03/08/2018    CALCIUM 8 7 11/30/2016    CALCIUM 7 4 (L) 06/30/2016    CALCIUM 7 8 (L) 06/30/2016    CALCIUM 8 1 (L) 06/29/2016    AST 32 03/10/2018    AST 25 03/08/2018    AST 17 11/30/2016    AST 23 06/29/2016    ALT 26 03/10/2018    ALT 26 03/08/2018    ALT 32 11/30/2016    ALT 35 06/29/2016    ALKPHOS 49 03/10/2018    ALKPHOS 64 03/08/2018    ALKPHOS 69 11/30/2016    ALKPHOS 70 06/29/2016    PROT 6 7 03/10/2018    PROT 8 0 03/08/2018    PROT 7 0 11/30/2016    PROT 7 4 06/29/2016    BILITOT 0 50 03/10/2018    BILITOT 0 40 03/08/2018    BILITOT 0 20 11/30/2016    BILITOT 0 40 06/29/2016    EGFR 43 03/10/2018    EGFR 43 03/09/2018    EGFR 47 03/08/2018    EGFR 43 5 11/30/2016    EGFR 46 3 06/30/2016    EGFR 41 0 06/30/2016    EGFR 49 1 06/29/2016    Lactic Acid: Lab Results   Component Value Date    LACTICACID 1 1 03/10/2018       Blood Culture:   Lab Results   Component Value Date    BLOODCX No Growth at 24 hrs  03/08/2018    BLOODCX No Growth at 24 hrs  03/08/2018       Urine Culture:   Lab Results   Component Value Date    URINECX  11/30/2016     50,000-59,000 cfu/ml beta hemolytic Streptococcus Group B    URINECX <10,000 cfu/ml Mixed Contaminants X2 11/30/2016    URINECX  06/29/2016     30,000-39,000 cfu/ml beta hemolytic Streptococcus Group B    URINECX 40,000-49,000 cfu/ml Mixed Contaminants X3 06/29/2016       Urinalysis: Lab Results   Component Value Date    COLORU Light Yellow 03/10/2018    CLARITYU Clear 03/10/2018    SPECGRAV 1 010 03/10/2018    PHUR 5 0 03/10/2018    LEUKOCYTESUR Small (A) 03/10/2018    NITRITE Negative 03/10/2018    PROTEINUA Negative 03/10/2018    GLUCOSEU Negative 03/10/2018    KETONESU Negative 03/10/2018    BILIRUBINUR Negative 03/10/2018    BLOODU Negative 03/10/2018    AMYLASE: No results found for: AMYLASE HEMOGLOBIN A1C:   Lab Results   Component Value Date    HGBA1C 8 4 (H) 03/09/2018       SEDRATE: No results found for: SEDRATE    CRP: No results found for: CRP    HEPATITIS: No results found for: HAV, HEPAIGM, HEPBIGM, HEPBCAB, HBEAG, HEPCAB    PPD: No results found for: PPD    Wound Culture: No results found for: WOUNDCULT    Sputum Culture: No results found for: SPUTUMCULTUR    CBC: Lab Results   Component Value Date    WBC 6 50 03/10/2018    RBC 4 36 03/10/2018     CMP: Lab Results   Component Value Date     03/10/2018     03/10/2018    CO2 25 03/10/2018    ANIONGAP 11 03/10/2018    BUN 25 03/10/2018    CREATININE 1 30 03/10/2018    GLUCOSE 159 (H) 03/10/2018    CALCIUM 8 0 (L) 03/10/2018    AST 32 03/10/2018    ALT 26 03/10/2018    ALKPHOS 49 03/10/2018    PROT 6 7 03/10/2018    BILITOT 0 50 03/10/2018    EGFR 43 03/10/2018     Blood Culture:   Lab Results   Component Value Date    BLOODCX No Growth at 24 hrs  03/08/2018    BLOODCX No Growth at 24 hrs  03/08/2018     Urine Culture:   Lab Results   Component Value Date    URINECX  11/30/2016     50,000-59,000 cfu/ml beta hemolytic Streptococcus Group B    URINECX <10,000 cfu/ml Mixed Contaminants X2 11/30/2016     Urinalysis: Lab Results   Component Value Date    COLORU Light Yellow 03/10/2018    CLARITYU Clear 03/10/2018    SPECGRAV 1 010 03/10/2018    PHUR 5 0 03/10/2018    LEUKOCYTESUR Small (A) 03/10/2018    NITRITE Negative 03/10/2018    PROTEINUA Negative 03/10/2018    GLUCOSEU Negative 03/10/2018    KETONESU Negative 03/10/2018    BILIRUBINUR Negative 03/10/2018    BLOODU Negative 03/10/2018     Wound Culture: No results found for: WOUNDCULT  Sputum Culture: No results found for: SPUTUMCULTUR  Lactic Acid:   Lab Results   Component Value Date    LACTICACID 1 1 03/10/2018       Imaging Studies:  Xr Chest 1 View Portable    Result Date: 3/8/2018  Narrative: CHEST INDICATION: 82-year-old female with dyspnea, vomiting, tachycardia   COMPARISON:  2/13/2015 EXAM PERFORMED/VIEWS:  XR CHEST PORTABLE Images: 1 FINDINGS: Cardiomediastinal silhouette appears unremarkable  The lungs are clear  No pneumothorax or pleural effusion  Osseous structures appear within normal limits for patient age  Impression: No acute cardiopulmonary disease  Workstation performed: [id number]        EKG, Pathology, and Other Studies: I have personally reviewed pertinent reports  and I have personally reviewed pertinent films in PACS  [unfilled]  * Cannot find OR log *    Assessment/Plan         Counseling / Coordination of Care  Total floor / unit time spent today 60 minutes  Greater than 50% of total time was spent with the patient and / or family counseling and / or coordination of care   A description of the counseling / coordination of care:

## 2018-03-11 LAB
ANION GAP SERPL CALCULATED.3IONS-SCNC: 7 MMOL/L (ref 4–13)
ATRIAL RATE: 120 BPM
BUN SERPL-MCNC: 29 MG/DL (ref 5–25)
CALCIUM SERPL-MCNC: 8.2 MG/DL (ref 8.3–10.1)
CHLORIDE SERPL-SCNC: 103 MMOL/L (ref 100–108)
CO2 SERPL-SCNC: 28 MMOL/L (ref 21–32)
CREAT SERPL-MCNC: 1.34 MG/DL (ref 0.6–1.3)
GFR SERPL CREATININE-BSD FRML MDRD: 42 ML/MIN/1.73SQ M
GLUCOSE SERPL-MCNC: 105 MG/DL (ref 65–140)
GLUCOSE SERPL-MCNC: 134 MG/DL (ref 65–140)
GLUCOSE SERPL-MCNC: 168 MG/DL (ref 65–140)
GLUCOSE SERPL-MCNC: 170 MG/DL (ref 65–140)
GLUCOSE SERPL-MCNC: 97 MG/DL (ref 65–140)
P AXIS: 38 DEGREES
POTASSIUM SERPL-SCNC: 4.3 MMOL/L (ref 3.5–5.3)
PR INTERVAL: 164 MS
QRS AXIS: -45 DEGREES
QRSD INTERVAL: 66 MS
QT INTERVAL: 310 MS
QTC INTERVAL: 438 MS
SODIUM SERPL-SCNC: 138 MMOL/L (ref 136–145)
T WAVE AXIS: 60 DEGREES
VENTRICULAR RATE: 120 BPM

## 2018-03-11 PROCEDURE — 80048 BASIC METABOLIC PNL TOTAL CA: CPT | Performed by: STUDENT IN AN ORGANIZED HEALTH CARE EDUCATION/TRAINING PROGRAM

## 2018-03-11 PROCEDURE — 99232 SBSQ HOSP IP/OBS MODERATE 35: CPT | Performed by: INTERNAL MEDICINE

## 2018-03-11 PROCEDURE — 82948 REAGENT STRIP/BLOOD GLUCOSE: CPT

## 2018-03-11 PROCEDURE — 99232 SBSQ HOSP IP/OBS MODERATE 35: CPT | Performed by: STUDENT IN AN ORGANIZED HEALTH CARE EDUCATION/TRAINING PROGRAM

## 2018-03-11 RX ORDER — LIDOCAINE 50 MG/G
3 PATCH TOPICAL DAILY
Status: DISCONTINUED | OUTPATIENT
Start: 2018-03-12 | End: 2018-03-12 | Stop reason: HOSPADM

## 2018-03-11 RX ADMIN — ACYCLOVIR SODIUM 500 MG: 50 INJECTION, SOLUTION INTRAVENOUS at 05:03

## 2018-03-11 RX ADMIN — ATORVASTATIN CALCIUM 40 MG: 40 TABLET, FILM COATED ORAL at 16:55

## 2018-03-11 RX ADMIN — LIDOCAINE 2 PATCH: 50 PATCH TOPICAL at 09:41

## 2018-03-11 RX ADMIN — LISINOPRIL 10 MG: 10 TABLET ORAL at 09:42

## 2018-03-11 RX ADMIN — ENOXAPARIN SODIUM 40 MG: 40 INJECTION SUBCUTANEOUS at 09:42

## 2018-03-11 RX ADMIN — ACYCLOVIR SODIUM 500 MG: 50 INJECTION, SOLUTION INTRAVENOUS at 21:30

## 2018-03-11 RX ADMIN — TORSEMIDE 10 MG: 10 TABLET ORAL at 09:42

## 2018-03-11 RX ADMIN — GABAPENTIN 100 MG: 100 CAPSULE ORAL at 09:43

## 2018-03-11 RX ADMIN — INSULIN GLARGINE 40 UNITS: 100 INJECTION, SOLUTION SUBCUTANEOUS at 22:34

## 2018-03-11 RX ADMIN — INSULIN LISPRO 2 UNITS: 100 INJECTION, SOLUTION INTRAVENOUS; SUBCUTANEOUS at 16:54

## 2018-03-11 RX ADMIN — ACYCLOVIR SODIUM 500 MG: 50 INJECTION, SOLUTION INTRAVENOUS at 12:45

## 2018-03-11 RX ADMIN — ASPIRIN 81 MG: 81 TABLET, COATED ORAL at 09:43

## 2018-03-11 RX ADMIN — MONTELUKAST SODIUM 10 MG: 10 TABLET, COATED ORAL at 22:28

## 2018-03-11 RX ADMIN — ACETAMINOPHEN 650 MG: 325 TABLET, FILM COATED ORAL at 05:03

## 2018-03-11 RX ADMIN — INSULIN GLARGINE 40 UNITS: 100 INJECTION, SOLUTION SUBCUTANEOUS at 09:45

## 2018-03-11 RX ADMIN — GABAPENTIN 100 MG: 100 CAPSULE ORAL at 16:55

## 2018-03-11 RX ADMIN — FAMOTIDINE 20 MG: 20 TABLET, FILM COATED ORAL at 09:43

## 2018-03-11 RX ADMIN — METOPROLOL TARTRATE 50 MG: 50 TABLET ORAL at 09:42

## 2018-03-11 RX ADMIN — METOPROLOL TARTRATE 50 MG: 50 TABLET ORAL at 22:28

## 2018-03-11 RX ADMIN — GABAPENTIN 100 MG: 100 CAPSULE ORAL at 22:29

## 2018-03-11 RX ADMIN — INSULIN LISPRO 1 UNITS: 100 INJECTION, SOLUTION INTRAVENOUS; SUBCUTANEOUS at 22:33

## 2018-03-11 RX ADMIN — ACETAMINOPHEN 650 MG: 325 TABLET, FILM COATED ORAL at 22:28

## 2018-03-11 NOTE — PROGRESS NOTES
Progress Note - Santiago File 1950, 74 y o  female MRN: [id number]    Unit/Bed#: [address] [place] [address] Encounter: [id number]    Primary Care Provider: Jade Smith   Date and time admitted to hospital: 3/8/2018 11:34 AM        Sepsis [facility])   Assessment & Plan    -began on day 2 of admission, as evidence by fever and tachycardia    -Source possibly herpes zoster but fever is quite high  Patient has no respiratory, GI or  complaints  -CXR on arrival showed NAD  -UA showed small leukocytes  -lactic acid 1 1  -procalcitonin pending  -ID consulted, recommend monitoring on acyclovir  No Abx for now  -fever trend improving now, if remains fever free can DC home tomorrow          * SVT (supraventricular tachycardia) ([facility])   Assessment & Plan    -on admission, noted on tele, short run of SVT  EKG was done and showed sinus tachy at 120  -patient with hx palpitations, was supposed to see cardio for holter monitor but has not had it done yet  -consulted cardio  -placed on tele, sinus tachy to low 100s  -TSH 3 038  -treated pain from shingles  -started low dose metoprolol with improvement of HR  -will need outpt cardio follow up        Shingles outbreak   Assessment & Plan    -Patients stated on acyclovir, Neurontin and lidocaine patch  -pain improved with current treatment  -contact precautions        Intractable painresolved as of 3/10/2018   Assessment & Plan    -2/2 shingles  -treat as noted, improved        Uncontrolled diabetes mellitus type 2 without complications ([facility] )   Assessment & Plan    -HA1c 8 4  -cont with patients home glargine, place on ISS  -diabetic diet        Morbid obesity with BMI of 45 0-49 9, adult ([facility] )   Assessment & Plan    -advised diet with loss  -Nutrition consult        Asthma   Assessment & Plan    -no exacerbation  -cont with home meds            VTE Pharmacologic Prophylaxis:   Pharmacologic: Enoxaparin (Lovenox)  Mechanical VTE Prophylaxis in Place: Yes    Patient Centered Rounds:  I have performed bedside rounds with nursing staff today  Discussions with Specialists or Other Care Team Provider: Yes Cardio    Education and Discussions with Family / Patient:Yes    Time Spent for Care: 30 minutes  More than 50% of total time spent on counseling and coordination of care as described above  Current Length of Stay: 3 day(s)    Current Patient Status: Inpatient     Discharge Plan: home tomorrow if remains fever free    Code Status: Level 1 - Full Code      Subjective:   Yolanda pain is controlled with lidocaine patches  No more episodes of nausea  Does not feel less sweaty flushed any more pain      Objective:       Vitals:   Temp (24hrs), Av 9 °F (37 7 °C), Min:98 4 °F (36 9 °C), Max:101 5 °F (38 6 °C)    HR:  [78-98] 82  Resp:  [18-20] 20  BP: (117-134)/(59-85) 119/85  SpO2:  [93 %-99 %] 99 %  Body mass index is 47 58 kg/m²  Input and Output Summary (last 24 hours): Intake/Output Summary (Last 24 hours) at 18 1709  Last data filed at 18 1445   Gross per 24 hour   Intake                0 ml   Output             1300 ml   Net            -1300 ml       Physical Exam:     Physical Exam   Constitutional: She is oriented to person, place, and time  She appears well-developed  No distress  HENT:   Head: Normocephalic and atraumatic  Cardiovascular: Normal rate, regular rhythm and normal heart sounds  Pulmonary/Chest: Effort normal and breath sounds normal  No respiratory distress  She has no wheezes  She has no rales  She exhibits no tenderness  Abdominal: Soft  Bowel sounds are normal  She exhibits no distension  There is no tenderness  There is no rebound and no guarding  Musculoskeletal: She exhibits no edema, tenderness or deformity  Neurological: She is alert and oriented to person, place, and time  Skin: Skin is warm and dry  Vesicular rash in a dermatomal pattern under the left breast fold   Lidocaine patches over parts of the rash   Psychiatric: She has a normal mood and affect  Her behavior is normal    Nursing note and vitals reviewed  Additional Data:     Labs:      Results from last 7 days  Lab Units 03/10/18  0909   WBC Thousand/uL 6 50   HEMOGLOBIN g/dL 12 7   HEMATOCRIT % 38 3   PLATELETS Thousands/uL 168   NEUTROS PCT % 63   LYMPHS PCT % 22   MONOS PCT % 13*   EOS PCT % 2       Results from last 7 days  Lab Units 03/11/18  0514 03/10/18  0909   SODIUM mmol/L 138 136   POTASSIUM mmol/L 4 3 4 4   CHLORIDE mmol/L 103 100   CO2 mmol/L 28 25   BUN mg/dL 29* 25   CREATININE mg/dL 1 34* 1 30   CALCIUM mg/dL 8 2* 8 0*   TOTAL PROTEIN g/dL  --  6 7   BILIRUBIN TOTAL mg/dL  --  0 50   ALK PHOS U/L  --  49   ALT U/L  --  26   AST U/L  --  32   GLUCOSE RANDOM mg/dL 105 159*           * I Have Reviewed All Lab Data Listed Above  * Additional Pertinent Lab Tests Reviewed: All Labs For Current Hospital Admission Reviewed    Imaging:  Xr Chest 1 View Portable    Result Date: 3/8/2018  Narrative: CHEST INDICATION: 79-year-old female with dyspnea, vomiting, tachycardia  COMPARISON:  2/13/2015 EXAM PERFORMED/VIEWS:  XR CHEST PORTABLE Images: 1 FINDINGS: Cardiomediastinal silhouette appears unremarkable  The lungs are clear  No pneumothorax or pleural effusion  Osseous structures appear within normal limits for patient age  Impression: No acute cardiopulmonary disease  Workstation performed: [id number]     Imaging Reports Reviewed by myself    Cultures:   Blood Culture:   Lab Results   Component Value Date    BLOODCX No Growth at 48 hrs  03/08/2018    BLOODCX No Growth at 48 hrs  03/08/2018     Urine Culture:   Lab Results   Component Value Date    URINECX  11/30/2016     50,000-59,000 cfu/ml beta hemolytic Streptococcus Group B    URINECX <10,000 cfu/ml Mixed Contaminants X2 11/30/2016    URINECX  06/29/2016     30,000-39,000 cfu/ml beta hemolytic Streptococcus Group B    URINECX 40,000-49,000 cfu/ml Mixed Contaminants X3 06/29/2016     Sputum Culture:  No components found for: SPUTUMCX  Wound Culture: No results found for: WOUNDCULT    Last 24 Hours Medication List:     Current Facility-Administered Medications:  acetaminophen 650 mg Oral Q6H PRN Christ Navarrete MD    acyclovir 10 mg/kg (Ideal) Intravenous Brian Cardoso MD Last Rate: 500 mg (03/11/18 1245)   albuterol 2 puff Inhalation Q6H PRN Christ Navarrete MD    aspirin 81 mg Oral Daily Christ Navarrete MD    atorvastatin 40 mg Oral Daily With Corrie Olea MD    enoxaparin 40 mg Subcutaneous Daily Christ Navarrete MD    famotidine 20 mg Oral Daily Christ Navarrete MD    fluticasone-salmeterol 2 puff Inhalation Q12H 3630 Corey Veras MD    gabapentin 100 mg Oral TID Christ Navarrete MD    insulin glargine 40 Units Subcutaneous Q12H 3630 Corey Veras MD    insulin lispro 1-5 Units Subcutaneous HS Christ Navarrete MD    insulin lispro 2-12 Units Subcutaneous TID AC Christ Navarrete MD    [START ON 3/12/2018] lidocaine 3 patch Transdermal Daily Christ Navarrete MD    lisinopril 10 mg Oral Daily Christ Navarrete MD    metoprolol tartrate 50 mg Oral Q12H [address] Sage Burks MD    montelukast 10 mg Oral HS Christ Navarrete MD    ondansetron 4 mg Intravenous Q6H PRN Christ Navarrete MD    torsemide 10 mg Oral Daily Christ Navarrete MD         Today, Patient Was Seen By: Christ Navarrete MD    ** Please Note: Dragon 360 Dictation voice to text software may have been used in the creation of this document   **

## 2018-03-11 NOTE — PROGRESS NOTES
Progress Note - Cardiology   Chau Stoddard 74 y o  female MRN: [id number]  Unit/Bed#: [address] [place] [address] Encounter: [id number]  03/10/18  7:55 PM        Assessment:  1  Paroxysmal SVT - asymptomatic  May be chronic  Continue metoprolol  Discussed with patient in detail  Can continue metoprolol and have event monitor done as outpatient  2  Obesity - agree with sleep study as outpatient  3  Shingles - improving  4  Dyslipidemia - on statin  5  DM  6  Chronic LE edema - none on exam today  Continue torsemide  Plan:  As above      Subjective: Chau Stoddard denies any chest pain or shortness of breath  No overnight events  She had an episode of SVT today at 4:20 as she was washing her face  She felt palpitations at that time but no other complaints      Meds/Allergies   current meds:   Current Facility-Administered Medications   Medication Dose Route Frequency    acetaminophen (TYLENOL) tablet 650 mg  650 mg Oral Q6H PRN    acyclovir (ZOVIRAX) 500 mg in sodium chloride 0 9 % 100 mL IVPB  10 mg/kg (Ideal) Intravenous Q8H    albuterol (PROVENTIL HFA,VENTOLIN HFA) inhaler 2 puff  2 puff Inhalation Q6H PRN    aspirin (ECOTRIN LOW STRENGTH) EC tablet 81 mg  81 mg Oral Daily    atorvastatin (LIPITOR) tablet 40 mg  40 mg Oral Daily With Dinner    enoxaparin (LOVENOX) subcutaneous injection 40 mg  40 mg Subcutaneous Daily    famotidine (PEPCID) tablet 20 mg  20 mg Oral Daily    fluticasone-salmeterol (ADVAIR) 100-50 mcg/dose inhaler 2 puff  2 puff Inhalation Q12H SELMA    gabapentin (NEURONTIN) capsule 100 mg  100 mg Oral TID    insulin glargine (LANTUS) subcutaneous injection 40 Units  40 Units Subcutaneous Q12H SELMA    insulin lispro (HumaLOG) 100 units/mL subcutaneous injection 1-5 Units  1-5 Units Subcutaneous HS    insulin lispro (HumaLOG) 100 units/mL subcutaneous injection 2-12 Units  2-12 Units Subcutaneous TID AC    lidocaine (LIDODERM) 5 % patch 2 patch  2 patch Transdermal Daily    lisinopril (ZESTRIL) tablet 10 mg  10 mg Oral Daily    metoprolol tartrate (LOPRESSOR) tablet 50 mg  50 mg Oral Q12H SELMA    montelukast (SINGULAIR) tablet 10 mg  10 mg Oral HS    ondansetron (ZOFRAN) injection 4 mg  4 mg Intravenous Q6H PRN    torsemide (DEMADEX) tablet 10 mg  10 mg Oral Daily     Allergies   Allergen Reactions    Asa [Aspirin] GI Intolerance    Indocin [Indomethacin] Other (See Comments)     Made patient "loopy"    Penicillins Hives       Objective   Vitals: Blood pressure 104/55, pulse 91, temperature 99 7 °F (37 6 °C), temperature source Tympanic, resp  rate 20, height 5' 2" (1 575 m), weight 118 kg (260 lb 2 3 oz), SpO2 92 %  , Body mass index is 47 58 kg/m²  Intake/Output Summary (Last 24 hours) at 03/10/18 1955  Last data filed at 03/10/18 0934   Gross per 24 hour   Intake              480 ml   Output             1050 ml   Net             -570 ml       Physical Exam   Constitutional: She appears healthy  No distress  HENT:   Nose: Nose normal    Mouth/Throat: Oropharynx is clear  Eyes: Conjunctivae are normal  Pupils are equal, round, and reactive to light  Neck: Neck supple  No JVD present  Cardiovascular: Normal rate and regular rhythm  Exam reveals no distant heart sounds and no friction rub  No murmur heard  Pulmonary/Chest: Effort normal and breath sounds normal  She has no wheezes  She has no rales  Abdominal: Soft  She exhibits no distension  There is no tenderness  Musculoskeletal: She exhibits no edema  Neurological: She is alert and oriented to person, place, and time  Skin: Skin is warm and dry  Rash noted         Lab Results:     Troponins:   Results from last 7 days  Lab Units 03/08/18  1231   CK TOTAL U/L 172   TROPONIN I ng/mL <0 02   CK MB INDEX % 1 1       Recent Results (from the past 24 hour(s))   Fingerstick Glucose (POCT)    Collection Time: 03/10/18  7:42 AM   Result Value Ref Range    POC Glucose 95 65 - 140 mg/dl   CBC and differential Collection Time: 03/10/18  9:09 AM   Result Value Ref Range    WBC 6 50 4 80 - 10 80 Thousand/uL    RBC 4 36 4 20 - 5 40 Million/uL    Hemoglobin 12 7 12 0 - 16 0 g/dL    Hematocrit 38 3 37 0 - 47 0 %    MCV 88 82 - 98 fL    MCH 29 2 27 0 - 31 0 pg    MCHC 33 2 31 4 - 37 4 g/dL    RDW 13 9 11 6 - 15 1 %    MPV 7 8 (L) 8 9 - 12 7 fL    Platelets 682 941 - 486 Thousands/uL    nRBC 0 /100 WBCs    Neutrophils Relative 63 43 - 75 %    Lymphocytes Relative 22 14 - 44 %    Monocytes Relative 13 (H) 4 - 12 %    Eosinophils Relative 2 0 - 6 %    Basophils Relative 1 0 - 1 %    Neutrophils Absolute 4 10 1 85 - 7 62 Thousands/µL    Lymphocytes Absolute 1 40 0 60 - 4 47 Thousands/µL    Monocytes Absolute 0 80 0 17 - 1 22 Thousand/µL    Eosinophils Absolute 0 10 0 00 - 0 61 Thousand/µL    Basophils Absolute 0 10 0 00 - 0 10 Thousands/µL   Comprehensive metabolic panel    Collection Time: 03/10/18  9:09 AM   Result Value Ref Range    Sodium 136 136 - 145 mmol/L    Potassium 4 4 3 5 - 5 3 mmol/L    Chloride 100 100 - 108 mmol/L    CO2 25 21 - 32 mmol/L    Anion Gap 11 4 - 13 mmol/L    BUN 25 5 - 25 mg/dL    Creatinine 1 30 0 60 - 1 30 mg/dL    Glucose 159 (H) 65 - 140 mg/dL    Calcium 8 0 (L) 8 3 - 10 1 mg/dL    AST 32 5 - 45 U/L    ALT 26 12 - 78 U/L    Alkaline Phosphatase 49 46 - 116 U/L    Total Protein 6 7 6 4 - 8 2 g/dL    Albumin 2 9 (L) 3 5 - 5 0 g/dL    Total Bilirubin 0 50 0 20 - 1 00 mg/dL    eGFR 43 ml/min/1 73sq m   Fingerstick Glucose (POCT)    Collection Time: 03/10/18 11:44 AM   Result Value Ref Range    POC Glucose 144 (H) 65 - 140 mg/dl   UA w Reflex to Microscopic w Reflex to Culture    Collection Time: 03/10/18 11:49 AM   Result Value Ref Range    Color, UA Light Yellow     Clarity, UA Clear     Specific [place], UA 1 010 1 000 - 1 030    pH, UA 5 0 5 0 - 9 0    Leukocytes, UA Small (A) Negative    Nitrite, UA Negative Negative    Protein, UA Negative Negative mg/dl    Glucose, UA Negative Negative mg/dl Ketones, UA Negative Negative mg/dl    Urobilinogen, UA 0 2 0 2, 1 0 E U /dl E U /dl    Bilirubin, UA Negative Negative    Blood, UA Negative Negative   Urine Microscopic    Collection Time: 03/10/18 11:49 AM   Result Value Ref Range    RBC, UA None Seen None Seen, 0-5 /hpf    WBC, UA 4-10 (A) None Seen, 0-5, 5-55, 5-65 /hpf    Epithelial Cells Occasional None Seen, Occasional /hpf    Bacteria, UA Occasional None Seen, Occasional /hpf   Lactic acid, plasma    Collection Time: 03/10/18 12:31 PM   Result Value Ref Range    LACTIC ACID 1 1 0 5 - 2 0 mmol/L   Fingerstick Glucose (POCT)    Collection Time: 03/10/18  4:18 PM   Result Value Ref Range    POC Glucose 181 (H) 65 - 140 mg/dl          Cardiac testing:     EKG/TELE: Personally reviewed  SVT at 4:20 pm today    Imaging: I have personally reviewed pertinent films in PACS

## 2018-03-11 NOTE — PLAN OF CARE
CARDIOVASCULAR - ADULT     Maintains optimal cardiac output and hemodynamic stability Progressing     Absence of cardiac dysrhythmias or at baseline rhythm Progressing        DISCHARGE PLANNING - CARE MANAGEMENT     Discharge to post-acute care or home with appropriate resources Progressing        INFECTION - ADULT     Absence or prevention of progression during hospitalization Progressing        PAIN - ADULT     Verbalizes/displays adequate comfort level or baseline comfort level Progressing        Potential for Falls     Patient will remain free of falls Progressing        SKIN/TISSUE INTEGRITY - ADULT     Skin integrity remains intact Progressing     Incision(s), wounds(s) or drain site(s) healing without S/S of infection Progressing

## 2018-03-11 NOTE — PROGRESS NOTES
Progress Note - Infectious Disease   Angeli Cornejo 74 y o  female MRN: [id number]  Unit/Bed#: [address] [place] [address] Encounter: [id number]        Assessment:  Herpes zoster left 7th thoracic dermatome  Temperature is trending lower    Plan:  Observe on acyclovir      Subjective/Objective     Subjective:  Complains of a left-sided headache  Also has some pain in the zoster lesions in the chest wall  Feels better      HR:  [85-98] 95  Resp:  [18-20] 18  BP: (104-134)/(55-62) 125/60  SpO2:  [92 %-98 %] 95 %      Objective: see PE    Meds/Allergies     Current Facility-Administered Medications:     acetaminophen (TYLENOL) tablet 650 mg, 650 mg, Oral, Q6H PRN, Matthieu Davis MD, 650 mg at 03/11/18 0503    acyclovir (ZOVIRAX) 500 mg in sodium chloride 0 9 % 100 mL IVPB, 10 mg/kg (Ideal), Intravenous, Q8H, Matthieu Davis MD, Last Rate: 100 mL/hr at 03/11/18 0503, 500 mg at 03/11/18 0503    albuterol (PROVENTIL HFA,VENTOLIN HFA) inhaler 2 puff, 2 puff, Inhalation, Q6H PRN, Matthieu Davis MD    aspirin (ECOTRIN LOW STRENGTH) EC tablet 81 mg, 81 mg, Oral, Daily, Matthieu Davis MD, 81 mg at 03/11/18 0943    atorvastatin (LIPITOR) tablet 40 mg, 40 mg, Oral, Daily With Jacquie Garcia MD, 40 mg at 03/10/18 1711    enoxaparin (LOVENOX) subcutaneous injection 40 mg, 40 mg, Subcutaneous, Daily, Matthieu Davis MD, 40 mg at 03/11/18 0942    famotidine (PEPCID) tablet 20 mg, 20 mg, Oral, Daily, Matthieu Davis MD, 20 mg at 03/11/18 0943    fluticasone-salmeterol (ADVAIR) 100-50 mcg/dose inhaler 2 puff, 2 puff, Inhalation, Q12H [address], Matthieu Davis MD, 2 puff at 03/10/18 0849    gabapentin (NEURONTIN) capsule 100 mg, 100 mg, Oral, TID, Matthieu Davis MD, 100 mg at 03/11/18 0943    insulin glargine (LANTUS) subcutaneous injection 40 Units, 40 Units, Subcutaneous, Q12H [address], Matthieu Davis MD, 40 Units at 03/11/18 0945    insulin lispro (HumaLOG) 100 units/mL subcutaneous injection 1-5 Units, 1-5 Units, Subcutaneous, HS, Matthieu Davis MD, 1 Units at 03/10/18 2221    insulin lispro (HumaLOG) 100 units/mL subcutaneous injection 2-12 Units, 2-12 Units, Subcutaneous, TID AC, 2 Units at 03/10/18 1712 **AND** Fingerstick Glucose (POCT), , , TID AC, Memory Canavan, MD    [START ON 3/12/2018] lidocaine (LIDODERM) 5 % patch 3 patch, 3 patch, Transdermal, Daily, Memory Canavan, MD    lisinopril (ZESTRIL) tablet 10 mg, 10 mg, Oral, Daily, Memory Canavan, MD, 10 mg at 18 4303    metoprolol tartrate (LOPRESSOR) tablet 50 mg, 50 mg, Oral, Q12H [address], Tawny Lombardi MD, 50 mg at 18 0942    montelukast (SINGULAIR) tablet 10 mg, 10 mg, Oral, HS, Memory Canavan, MD, 10 mg at 03/10/18 2218    ondansetron (ZOFRAN) injection 4 mg, 4 mg, Intravenous, Q6H PRN, Memory Canavan, MD, 4 mg at 03/10/18 0154    torsemide (DEMADEX) tablet 10 mg, 10 mg, Oral, Daily, Memory Canavan, MD, 10 mg at 18 8953  Allergies   Allergen Reactions    Asa [Aspirin] GI Intolerance    Indocin [Indomethacin] Other (See Comments)     Made patient "loopy"    Penicillins Hives     all current active meds have been reviewed    all current active meds have been reviewed    Physical Exam: Physical Exam   Constitutional: She is oriented to person, place, and time  She appears well-developed and well-nourished  Obese   HENT:   Head: Normocephalic  Eyes: Pupils are equal, round, and reactive to light  No scleral icterus  Neck: Neck supple  No thyromegaly present  Cardiovascular: Normal heart sounds  Pulmonary/Chest: No respiratory distress  Abdominal: Soft  Bowel sounds are normal    Musculoskeletal: Normal range of motion  Neurological: She is alert and oriented to person, place, and time  Skin: Skin is warm  Rash noted  There is erythema  Herpes zoster rash occupying the 7th left thoracic dermatome   Psychiatric: She has a normal mood and affect  Nursing note and vitals reviewed          Temp (24hrs), Av °F (37 8 °C), Min:98 8 °F (37 1 °C), Max:101 5 °F (38 6 °C)  Current: Temperature: 98 8 °F (37 1 °C)    Invasive Devices     Peripheral Intravenous Line            Peripheral IV 03/10/18 Right Arm 1 day                            Lab, Imaging and other studies:      CBC: Lab Results   Component Value Date    WBC 6 50 03/10/2018    RBC 4 36 03/10/2018       Results from last 7 days  Lab Units 03/10/18  0909 03/09/18  0548 03/08/18  1231   PLATELETS Thousands/uL 168 192 230     CMP: Lab Results   Component Value Date     03/11/2018     03/11/2018    CO2 28 03/11/2018    ANIONGAP 7 03/11/2018    BUN 29 (H) 03/11/2018    CREATININE 1 34 (H) 03/11/2018    GLUCOSE 105 03/11/2018    CALCIUM 8 2 (L) 03/11/2018    AST 32 03/10/2018    ALT 26 03/10/2018    ALKPHOS 49 03/10/2018    PROT 6 7 03/10/2018    BILITOT 0 50 03/10/2018    EGFR 42 03/11/2018       Urinalysis: Lab Results   Component Value Date    COLORU Light Yellow 03/10/2018    CLARITYU Clear 03/10/2018    SPECGRAV 1 010 03/10/2018    PHUR 5 0 03/10/2018    LEUKOCYTESUR Small (A) 03/10/2018    NITRITE Negative 03/10/2018    PROTEINUA Negative 03/10/2018    GLUCOSEU Negative 03/10/2018    KETONESU Negative 03/10/2018    BILIRUBINUR Negative 03/10/2018    BLOODU Negative 03/10/2018       Lactic Acid:   Lab Results   Component Value Date    LACTICACID 1 1 03/10/2018        Cultures    Results from last 7 days  Lab Units 03/08/18  1834 03/08/18  1231   BLOOD CULTURE   --  No Growth at 48 hrs  No Growth at 48 hrs  MRSA CULTURE ONLY  No Methicillin Resistant Staphlyococcus aureus (MRSA) isolated  --        I have personally reviewed pertinent reports        Principal Problem:    SVT (supraventricular tachycardia) ([facility])  Active Problems:    Shingles outbreak    Asthma    Uncontrolled diabetes mellitus type 2 without complications ([email or web address] )    Morbid obesity with BMI of 45 0-49 9, adult ([email or web address] 75 )    Sepsis ([place] [address] )

## 2018-03-11 NOTE — PROGRESS NOTES
Progress Note - Cardiology   James Fox 74 y o  female MRN: [id number]  Unit/Bed#: [address] [place] [address] Encounter: [id number]  03/11/18  5:17 PM        Assessment:  1  Paroxysmal SVT - asymptomatic  May be chronic  Continue metoprolol  Discussed with patient in detail  Can continue metoprolol and have event monitor done as outpatient  2  Obesity - agree with sleep study as outpatient  3  Shingles - improving  Still with intermittent fevers  4  Dyslipidemia - on statin  5  DM  6  Chronic LE edema - none on exam today  Continue torsemide  Plan:  As above      Subjective: James Fox denies any chest pain or shortness of breath  No overnight events  She had an episode of SVT today at 4:20 as she was washing her face  She felt palpitations at that time but no other complaints      Meds/Allergies   current meds:   Current Facility-Administered Medications   Medication Dose Route Frequency    acetaminophen (TYLENOL) tablet 650 mg  650 mg Oral Q6H PRN    acyclovir (ZOVIRAX) 500 mg in sodium chloride 0 9 % 100 mL IVPB  10 mg/kg (Ideal) Intravenous Q8H    albuterol (PROVENTIL HFA,VENTOLIN HFA) inhaler 2 puff  2 puff Inhalation Q6H PRN    aspirin (ECOTRIN LOW STRENGTH) EC tablet 81 mg  81 mg Oral Daily    atorvastatin (LIPITOR) tablet 40 mg  40 mg Oral Daily With Dinner    enoxaparin (LOVENOX) subcutaneous injection 40 mg  40 mg Subcutaneous Daily    famotidine (PEPCID) tablet 20 mg  20 mg Oral Daily    fluticasone-salmeterol (ADVAIR) 100-50 mcg/dose inhaler 2 puff  2 puff Inhalation Q12H SELMA    gabapentin (NEURONTIN) capsule 100 mg  100 mg Oral TID    insulin glargine (LANTUS) subcutaneous injection 40 Units  40 Units Subcutaneous Q12H SELMA    insulin lispro (HumaLOG) 100 units/mL subcutaneous injection 1-5 Units  1-5 Units Subcutaneous HS    insulin lispro (HumaLOG) 100 units/mL subcutaneous injection 2-12 Units  2-12 Units Subcutaneous TID AC    [START ON 3/12/2018] lidocaine (LIDODERM) 5 % patch 3 patch  3 patch Transdermal Daily    lisinopril (ZESTRIL) tablet 10 mg  10 mg Oral Daily    metoprolol tartrate (LOPRESSOR) tablet 50 mg  50 mg Oral Q12H SELMA    montelukast (SINGULAIR) tablet 10 mg  10 mg Oral HS    ondansetron (ZOFRAN) injection 4 mg  4 mg Intravenous Q6H PRN    torsemide (DEMADEX) tablet 10 mg  10 mg Oral Daily     Allergies   Allergen Reactions    Asa [Aspirin] GI Intolerance    Indocin [Indomethacin] Other (See Comments)     Made patient "loopy"    Penicillins Hives       Objective   Vitals: Blood pressure 119/85, pulse 82, temperature (!) 100 8 °F (38 2 °C), temperature source Tympanic, resp  rate 20, height 5' 2" (1 575 m), weight 118 kg (260 lb 2 3 oz), SpO2 99 %  , Body mass index is 47 58 kg/m²  Intake/Output Summary (Last 24 hours) at 03/11/18 1717  Last data filed at 03/11/18 1445   Gross per 24 hour   Intake                0 ml   Output             1300 ml   Net            -1300 ml       Physical Exam   Constitutional: She appears healthy  No distress  HENT:   Nose: Nose normal    Mouth/Throat: Oropharynx is clear  Eyes: Conjunctivae are normal  Pupils are equal, round, and reactive to light  Neck: Neck supple  No JVD present  Cardiovascular: Normal rate and regular rhythm  Exam reveals no distant heart sounds and no friction rub  No murmur heard  Pulmonary/Chest: Effort normal and breath sounds normal  She has no wheezes  She has no rales  Abdominal: Soft  She exhibits no distension  There is no tenderness  Musculoskeletal: She exhibits no edema  Neurological: She is alert and oriented to person, place, and time  Skin: Skin is warm and dry  Rash noted         Lab Results:     Troponins:     Results from last 7 days  Lab Units 03/08/18  1231   CK TOTAL U/L 172   TROPONIN I ng/mL <0 02   CK MB INDEX % 1 1       Recent Results (from the past 24 hour(s))   Fingerstick Glucose (POCT)    Collection Time: 03/10/18 10:14 PM   Result Value Ref Range    POC Glucose 203 (H) 65 - 140 mg/dl   Basic metabolic panel    Collection Time: 03/11/18  5:14 AM   Result Value Ref Range    Sodium 138 136 - 145 mmol/L    Potassium 4 3 3 5 - 5 3 mmol/L    Chloride 103 100 - 108 mmol/L    CO2 28 21 - 32 mmol/L    Anion Gap 7 4 - 13 mmol/L    BUN 29 (H) 5 - 25 mg/dL    Creatinine 1 34 (H) 0 60 - 1 30 mg/dL    Glucose 105 65 - 140 mg/dL    Calcium 8 2 (L) 8 3 - 10 1 mg/dL    eGFR 42 ml/min/1 73sq m   Fingerstick Glucose (POCT)    Collection Time: 03/11/18  7:29 AM   Result Value Ref Range    POC Glucose 97 65 - 140 mg/dl   Fingerstick Glucose (POCT)    Collection Time: 03/11/18 11:43 AM   Result Value Ref Range    POC Glucose 134 65 - 140 mg/dl   Fingerstick Glucose (POCT)    Collection Time: 03/11/18  4:32 PM   Result Value Ref Range    POC Glucose 170 (H) 65 - 140 mg/dl          Cardiac testing:     EKG/TELE: Personally reviewed  No events since 4 pm yesterday    Imaging: I have personally reviewed pertinent films in PACS

## 2018-03-12 VITALS
HEART RATE: 87 BPM | TEMPERATURE: 98.5 F | RESPIRATION RATE: 18 BRPM | HEIGHT: 62 IN | BODY MASS INDEX: 47.87 KG/M2 | SYSTOLIC BLOOD PRESSURE: 122 MMHG | OXYGEN SATURATION: 95 % | DIASTOLIC BLOOD PRESSURE: 57 MMHG | WEIGHT: 260.14 LBS

## 2018-03-12 LAB
ANION GAP SERPL CALCULATED.3IONS-SCNC: 11 MMOL/L (ref 4–13)
ANION GAP SERPL CALCULATED.3IONS-SCNC: 9 MMOL/L (ref 4–13)
BUN SERPL-MCNC: 24 MG/DL (ref 5–25)
BUN SERPL-MCNC: 26 MG/DL (ref 5–25)
CALCIUM SERPL-MCNC: 8 MG/DL (ref 8.3–10.1)
CALCIUM SERPL-MCNC: 8.3 MG/DL (ref 8.3–10.1)
CHLORIDE SERPL-SCNC: 104 MMOL/L (ref 100–108)
CHLORIDE SERPL-SCNC: 104 MMOL/L (ref 100–108)
CO2 SERPL-SCNC: 25 MMOL/L (ref 21–32)
CO2 SERPL-SCNC: 26 MMOL/L (ref 21–32)
CREAT SERPL-MCNC: 1.17 MG/DL (ref 0.6–1.3)
CREAT SERPL-MCNC: 1.21 MG/DL (ref 0.6–1.3)
ERYTHROCYTE [DISTWIDTH] IN BLOOD BY AUTOMATED COUNT: 13.9 % (ref 11.6–15.1)
GFR SERPL CREATININE-BSD FRML MDRD: 47 ML/MIN/1.73SQ M
GFR SERPL CREATININE-BSD FRML MDRD: 49 ML/MIN/1.73SQ M
GLUCOSE SERPL-MCNC: 102 MG/DL (ref 65–140)
GLUCOSE SERPL-MCNC: 117 MG/DL (ref 65–140)
GLUCOSE SERPL-MCNC: 155 MG/DL (ref 65–140)
GLUCOSE SERPL-MCNC: 164 MG/DL (ref 65–140)
HCT VFR BLD AUTO: 36.1 % (ref 37–47)
HGB BLD-MCNC: 11.9 G/DL (ref 12–16)
MCH RBC QN AUTO: 28.5 PG (ref 27–31)
MCHC RBC AUTO-ENTMCNC: 33 G/DL (ref 31.4–37.4)
MCV RBC AUTO: 86 FL (ref 82–98)
PLATELET # BLD AUTO: 159 THOUSANDS/UL (ref 130–400)
PMV BLD AUTO: 7.7 FL (ref 8.9–12.7)
POTASSIUM SERPL-SCNC: 4.3 MMOL/L (ref 3.5–5.3)
POTASSIUM SERPL-SCNC: 5.8 MMOL/L (ref 3.5–5.3)
RBC # BLD AUTO: 4.17 MILLION/UL (ref 4.2–5.4)
SODIUM SERPL-SCNC: 138 MMOL/L (ref 136–145)
SODIUM SERPL-SCNC: 141 MMOL/L (ref 136–145)
WBC # BLD AUTO: 6.4 THOUSAND/UL (ref 4.8–10.8)

## 2018-03-12 PROCEDURE — 80048 BASIC METABOLIC PNL TOTAL CA: CPT | Performed by: STUDENT IN AN ORGANIZED HEALTH CARE EDUCATION/TRAINING PROGRAM

## 2018-03-12 PROCEDURE — 99239 HOSP IP/OBS DSCHRG MGMT >30: CPT | Performed by: STUDENT IN AN ORGANIZED HEALTH CARE EDUCATION/TRAINING PROGRAM

## 2018-03-12 PROCEDURE — 85027 COMPLETE CBC AUTOMATED: CPT | Performed by: STUDENT IN AN ORGANIZED HEALTH CARE EDUCATION/TRAINING PROGRAM

## 2018-03-12 PROCEDURE — 82948 REAGENT STRIP/BLOOD GLUCOSE: CPT

## 2018-03-12 RX ORDER — LIDOCAINE 50 MG/G
3 PATCH TOPICAL DAILY
Qty: 42 PATCH | Refills: 0 | Status: SHIPPED | OUTPATIENT
Start: 2018-03-12 | End: 2018-07-15

## 2018-03-12 RX ORDER — GABAPENTIN 100 MG/1
100 CAPSULE ORAL 3 TIMES DAILY
Qty: 42 CAPSULE | Refills: 0 | Status: SHIPPED | OUTPATIENT
Start: 2018-03-12 | End: 2018-07-15

## 2018-03-12 RX ORDER — METOPROLOL TARTRATE 50 MG/1
50 TABLET, FILM COATED ORAL EVERY 12 HOURS SCHEDULED
Qty: 60 TABLET | Refills: 0 | Status: SHIPPED | OUTPATIENT
Start: 2018-03-12 | End: 2019-05-30 | Stop reason: HOSPADM

## 2018-03-12 RX ORDER — ACYCLOVIR 800 MG/1
800 TABLET ORAL 4 TIMES DAILY
Qty: 12 TABLET | Refills: 0 | Status: SHIPPED | OUTPATIENT
Start: 2018-03-12 | End: 2018-07-15

## 2018-03-12 RX ADMIN — INSULIN GLARGINE 40 UNITS: 100 INJECTION, SOLUTION SUBCUTANEOUS at 09:56

## 2018-03-12 RX ADMIN — ACYCLOVIR SODIUM 500 MG: 50 INJECTION, SOLUTION INTRAVENOUS at 05:58

## 2018-03-12 RX ADMIN — ASPIRIN 81 MG: 81 TABLET, COATED ORAL at 09:56

## 2018-03-12 RX ADMIN — FAMOTIDINE 20 MG: 20 TABLET, FILM COATED ORAL at 09:55

## 2018-03-12 RX ADMIN — METOPROLOL TARTRATE 50 MG: 50 TABLET ORAL at 09:56

## 2018-03-12 RX ADMIN — TORSEMIDE 10 MG: 10 TABLET ORAL at 09:55

## 2018-03-12 RX ADMIN — GABAPENTIN 100 MG: 100 CAPSULE ORAL at 09:56

## 2018-03-12 RX ADMIN — LISINOPRIL 10 MG: 10 TABLET ORAL at 09:55

## 2018-03-12 RX ADMIN — LIDOCAINE 3 PATCH: 50 PATCH TOPICAL at 09:57

## 2018-03-12 NOTE — PLAN OF CARE
CARDIOVASCULAR - ADULT     Maintains optimal cardiac output and hemodynamic stability Adequate for Discharge     Absence of cardiac dysrhythmias or at baseline rhythm Adequate for Discharge        DISCHARGE PLANNING - CARE MANAGEMENT     Discharge to post-acute care or home with appropriate resources Adequate for Discharge        INFECTION - ADULT     Absence or prevention of progression during hospitalization Adequate for Discharge        PAIN - ADULT     Verbalizes/displays adequate comfort level or baseline comfort level Adequate for Discharge        Potential for Falls     Patient will remain free of falls Adequate for Discharge        SKIN/TISSUE INTEGRITY - ADULT     Skin integrity remains intact Adequate for Discharge     Incision(s), wounds(s) or drain site(s) healing without S/S of infection Adequate for Discharge

## 2018-03-12 NOTE — DISCHARGE INSTRUCTIONS
Atrial Tachycardia   WHAT YOU NEED TO KNOW:   Atrial tachycardia is a condition that causes your heart to beat more than 100 times each minute  Atrial tachycardia is also called supraventricular tachycardia  It can develop because of problems with your heart's electrical system  Any of the following may also put you at risk for atrial tachycardia:  · A heart condition, hypertension, or fatigue    · Anxiety, stress, or pain    · Large amounts of caffeine from coffee, tea, and energy drinks    · Heavy alcohol use or cigarette smoking    · Use of some medicines or street drugs  DISCHARGE INSTRUCTIONS:   Call 911 or have someone call if:  · You have any of the following signs of a heart attack:     ¨ Squeezing, pressure, or pain in your chest that lasts longer than a few minutes  Chest pain may come and go  ¨ Pain in your jaw, neck, one or both arms, upper and lower back, or stomach  ¨ Shortness of breath, or panting    ¨ Nausea or vomiting    ¨ Lightheadedness    · You cannot be woken  Contact your healthcare provider if:  · Your pulse is faster than your healthcare provider said it should be  · You have frequent periods of a fast heart rate  · You feel weak or dizzy  · You have questions or concerns about your condition or care  Medicines: You may  be given any of the following:  · Antiarrhythmia medicines  help keep your heartbeat in a regular rhythm  · Beta blockers  help slow your heartbeat and keep it in a regular rhythm  · Calcium channel blockers  help slow your heartbeat  · Blood thinners  help prevent blood clots  Clots can lead to stroke, heart attack, and death  Aspirin is a type of blood thinner  You may need to take an aspirin each day to help prevent blood clots  Do not take acetaminophen or ibuprofen instead  Do not take more or less aspirin than healthcare providers say to take   If you are on other blood thinner medicine, ask your healthcare provider before you take aspirin for any reason  · Take your medicine as directed  Contact your healthcare provider if you think your medicine is not helping or if you have side effects  Tell him or her if you are allergic to any medicine  Keep a list of the medicines, vitamins, and herbs you take  Include the amounts, and when and why you take them  Bring the list or the pill bottles to follow-up visits  Carry your medicine list with you in case of an emergency  Follow up with your healthcare provider or cardiologist as directed: You may need more tests  Write down your questions so you remember to ask them during your visits  Prevention and Management:   · Decrease the amount of caffeine you drink  Caffeine can increase your heart rate  · Limit or do not drink alcohol  Alcohol can increase your heart rate  Ask your healthcare provider if it is safe for you to drink alcohol  Also ask how much is safe for you to drink  · Do not smoke  Nicotine and other chemicals in cigarettes can cause damage to your heart  Ask your healthcare provider for information if you currently smoke and need help to quit  E-cigarettes or smokeless tobacco still contain nicotine  Talk to your healthcare provider before you use these products  · Do not use illegal drugs  Drugs such as meth and cocaine can increase your heart rate  Talk to your healthcare provider if you use illegal drugs and need help to quit  · Get more rest   Fatigue can cause your heart rate to increase  · Learn ways to cope with stress  Stress, fear, and anxiety can cause a fast heart rate  Your healthcare provider may recommend relaxation techniques and deep breathing exercises  Your healthcare provider may recommend you talk to someone about your stress or anxiety, such as a counselor or a trusted friend  Check your pulse as directed: Your healthcare provider will show you how to check your pulse, and how often to check it   Write down how fast your pulse is and if it feels regular or like it is skipping beats  Also write down the activity you were doing if your heart rate is above 100  Bring the information with you to your follow-up appointment  © 2017 [address] Information is for End User's use only and may not be sold, redistributed or otherwise used for commercial purposes  All illustrations and images included in CareNotes® are the copyrighted property of [employer]  or Joe Gan  The above information is an  only  It is not intended as medical advice for individual conditions or treatments  Talk to your doctor, nurse or pharmacist before following any medical regimen to see if it is safe and effective for you  Metoprolol (By mouth)   Metoprolol (met-oh-PROE-lol)  Treats high blood pressure, angina (chest pain), and heart failure  May lower the risk of death after a heart attack  This medicine is a beta-blocker  Brand Name(s): Lopressor, Toprol XL   There may be other brand names for this medicine  When This Medicine Should Not Be Used: This medicine is not right for everyone  Do not use if you had an allergic reaction to metoprolol or similar medicines  Do not use this medicine if you have certain blood circulation or heart problems  Ask your doctor about these problems  How to Use This Medicine:   Tablet, Long Acting Tablet  · Take your medicine as directed  Your dose may need to be changed several times to find what works best for you  · Take this medicine with a meal or right after a meal  Take this medicine the same way every day, at the same time  · Swallow the tablet whole with a glass of water  You may break the extended-release tablet in half, but do not chew or crush it  · Missed dose: Take a dose as soon as you remember  If it is almost time for your next dose, wait until then and take a regular dose  Do not take extra medicine to make up for a missed dose    · Store the medicine in a closed container at room temperature, away from heat, moisture, and direct light  Drugs and Foods to Avoid:   Ask your doctor or pharmacist before using any other medicine, including over-the-counter medicines, vitamins, and herbal products  · Some medicines can affect how metoprolol works  Tell your doctor if you are taking any of the following:   ¨ Digoxin, dipyridamole, hydralazine, hydroxychloroquine, methyldopa, quinidine  ¨ Medicine to treat depression (such as bupropion, clomipramine, desipramine, fluoxetine, fluvoxamine, paroxetine, sertraline), medicine to treat mental illness (such as chlorpromazine, fluphenazine, haloperidol, thioridazine), medicine for heart rhythm problems (such as propafenone), HIV/AIDS medicine (such as ritonavir), medicine to treat a fungus infection (such as terbinafine), a monoamine oxidase inhibitor (MAOI), an ergot medicine for headaches, a calcium channel blocker (such as amlodipine, diltiazem, verapamil), or an alpha blocker (such as clonidine, prazosin, reserpine, guanethidine)  Warnings While Using This Medicine:   · Tell your doctor if you are pregnant or breastfeeding, or if you have blood vessel, heart, or circulation problems (such as heart failure, rhythm problems, or slow heartbeat)  Tell your doctor if you have kidney disease, liver disease, diabetes, lung disease (such as asthma), an overactive thyroid, or a history of allergies  · This medicine may cause worse symptoms of heart failure while the dose is being adjusted  · Do not stop using this medicine suddenly  Your doctor will need to slowly decrease your dose before you stop it completely  · Tell any doctor or dentist who treats you that you are using this medicine  You may need to stop using this medicine several days before you have surgery or medical tests  · This medicine could lower your blood pressure too much, especially when you first use it or if you are dehydrated   Stand or sit up slowly if you feel lightheaded or dizzy  · This medicine may make you dizzy or drowsy  Do not drive or do anything else that could be dangerous until you know how this medicine affects you  · Your doctor will check your progress and the effects of this medicine at regular visits  Keep all appointments  · Keep all medicine out of the reach of children  Never share your medicine with anyone  Possible Side Effects While Using This Medicine:   Call your doctor right away if you notice any of these side effects:  · Allergic reaction: Itching or hives, swelling in your face or hands, swelling or tingling in your mouth or throat, chest tightness, trouble breathing  · Lightheadedness, dizziness, or fainting  · Slow heartbeat  · Swelling in your hands, ankles, or feet, trouble breathing, tiredness  · Worsening chest pain  If you notice these less serious side effects, talk with your doctor:   · Diarrhea  · Mild dizziness or tiredness  If you notice other side effects that you think are caused by this medicine, tell your doctor  Call your doctor for medical advice about side effects  You may report side effects to FDA at 2-614-FDA-5543  © 2017 [address] Information is for End User's use only and may not be sold, redistributed or otherwise used for commercial purposes  The above information is an  only  It is not intended as medical advice for individual conditions or treatments  Talk to your doctor, nurse or pharmacist before following any medical regimen to see if it is safe and effective for you  Shingles   WHAT YOU NEED TO KNOW:   Shingles is a painful infection caused by the same virus that causes chickenpox (varicella-zoster virus)  After you get chickenpox, the virus stays in your body for several years without causing any symptoms  Shingles occurs when the virus becomes active again  Once active, the virus will travel along a nerve to your skin and cause a rash  DISCHARGE INSTRUCTIONS:   Seek care immediately if:   · You have painful, red, warm skin around the blisters, or the blisters drain pus  · Your neck is stiff or you have trouble moving it  · You have trouble moving your arms, legs, or face  · You have a seizure  · You have weakness in an arm or leg  · You become confused, or have difficulty speaking  · You have dizziness, a severe headache, or hearing or vision loss  Contact your healthcare provider if:   · You feel weak or have a headache  · You have a cough, chills, or a fever  · You have abdominal pain or nausea, or you are vomiting  · Your rash becomes more itchy or painful  · Your rash spreads to other parts of your body  · Your pain worsens and does not go away even after you take medicine  · You have questions or concerns about your condition or care  Medicines:   · Antiviral medicine  helps decrease symptoms and healing time  It may also decrease your risk for nerve pain  You will need to start taking this medicine within 3 days of the start of symptoms to prevent nerve pain  · Pain medicine  may be prescribed or suggested by your healthcare provider  You may need NSAIDs, acetaminophen, or opioid medicine depending on how much pain you are in  Do not wait until the pain is severe before you take more pain medicine  · Topical anesthetics  are used to numb the skin and decrease pain  They can be a cream, gel, spray, or patch  · Anticonvulsants  decrease nerve pain and may help you sleep at night  · Antidepressants  may be used to decrease nerve pain  · Take your medicine as directed  Contact your healthcare provider if you think your medicine is not helping or if you have side effects  Tell him or her if you are allergic to any medicine  Keep a list of the medicines, vitamins, and herbs you take  Include the amounts, and when and why you take them   Bring the list or the pill bottles to follow-up visits  Carry your medicine list with you in case of an emergency  Follow up with your healthcare provider as directed:  Write down your questions so you remember to ask them during your visits  Self-care:  Keep your rash clean and dry  Cover your rash with a bandage or clothing  Do not use bandages that stick to your skin  The sticky part may irritate your skin and make your rash last longer  Prevent the spread of the shingles: The virus can be passed to a person who has never had chickenpox  This person may get chickenpox, but not shingles  You may pass the virus to others as long as you have a rash  The virus is spread by direct contact with the fluid from the blisters  Usually, you cannot spread the virus once the blisters dry up  Prevent shingles or another shingles outbreak:  A vaccine may be given to help prevent shingles  Ask for more information about this vaccine  For more information:   · Centers for Disease Control and Prevention  [address] , [address]  Phone: 8- 920 - 1434006  Phone: 9- 388 - [phone]  Web Address: [employer] com br  © 2017 [address]  Information is for End User's use only and may not be sold, redistributed or otherwise used for commercial purposes  All illustrations and images included in CareNotes® are the copyrighted property of [email or web address] , [employer]  or Joe Gan  The above information is an  only  It is not intended as medical advice for individual conditions or treatments  Talk to your doctor, nurse or pharmacist before following any medical regimen to see if it is safe and effective for you  Gabapentin (By mouth)   Gabapentin (hector-a-PEN-tin)  Treats seizures and pain caused by shingles  Brand Name(s): ACTIVE-PAC with Gabapentin, Convenience Roshan, Cyclo/Sarah 10/300 Pack, FusePaq Fanatrex, Sarah-V, Gralise, [address] Pack, Neurontin, SmartRx Sarah Kit   There may be other brand names for this medicine    When This Medicine Should Not Be Used: This medicine is not right for everyone  Do not use it if you had an allergic reaction to gabapentin  How to Use This Medicine:   Capsule, Liquid, Tablet  · Take your medicine as directed  Your dose may need to be changed several times to find what works best for you  If you have epilepsy, do not allow more than 12 hours to pass between doses  · Capsule: Swallow the capsule whole with plenty of water  Do not open, crush, or chew it  · Gralise® tablet: Swallow the tablet whole   Do not crush, break, or chew it  · Neurontin® tablet: If you break a tablet into 2 pieces, use the second half as your next dose  If you don't use it within 28 days, throw it away  · Measure the oral liquid medicine with a marked measuring spoon, oral syringe, or medicine cup  · This medicine should come with a Medication Guide  Ask your pharmacist for a copy if you do not have one  · Missed dose: Take a dose as soon as you remember  If it is almost time for your next dose, wait until then and take a regular dose  Do not take extra medicine to make up for a missed dose  · Store the medicine in a closed container at room temperature, away from heat, moisture, and direct light  Store the Neurontin® oral liquid in the refrigerator  Do not freeze  Drugs and Foods to Avoid:   Ask your doctor or pharmacist before using any other medicine, including over-the-counter medicines, vitamins, and herbal products  · Some medicines can affect how gabapentin works  Tell your doctor if you also use any of the following:   ¨ Hydrocodone  ¨ Morphine  · If you take an antacid, wait at least 2 hours before you take gabapentin  · Tell your doctor if you use anything else that makes you sleepy  Some examples are allergy medicine, narcotic pain medicine, and alcohol  Warnings While Using This Medicine:   · Tell your doctor if you are pregnant or breastfeeding, or if you have kidney problems or are receiving dialysis  Tell your doctor if you have a history of depression or mental health problems  · This medicine may increase depression or thoughts of suicide  Tell your doctor right away if you start to feel more depressed or think about hurting yourself  · This medicine may cause a serious allergic reaction called multiorgan hypersensitivity, which can damage organs and be life-threatening  · Do not stop using this medicine suddenly  Your doctor will need to slowly decrease your dose before you stop it completely  If you take this medicine to prevent seizures, your seizures may return or occur more often if you stop this medicine suddenly  · This medicine may make you dizzy or drowsy  Do not drive or do anything else that could be dangerous until you know how this medicine affects you  · Tell any doctor or dentist who treats you that you are using this medicine  This medicine may affect certain medical test results  · Your doctor will check your progress and the effects of this medicine at regular visits  Keep all appointments  · Keep all medicine out of the reach of children  Never share your medicine with anyone    Possible Side Effects While Using This Medicine:   Call your doctor right away if you notice any of these side effects:  · Allergic reaction: Itching or hives, swelling in your face or hands, swelling or tingling in your mouth or throat, chest tightness, trouble breathing  · Behavior problems, aggression, restlessness, trouble concentrating, moodiness (especially in children)  · Blistering, peeling, red skin rash  · Change in how much or how often you urinate, bloody or cloudy urine,  · Chest pain, fast heartbeat, trouble breathing  · Dark urine or pale stools, nausea, vomiting, loss of appetite, stomach pain, yellow skin or eyes  · Fever, rash, swollen or tender glands in the neck, armpit, or groin  · Problems with coordination, shakiness, unsteadiness  · Rapid weight gain, swelling in your hands, ankles, or feet  · Unusual moods or behaviors, thoughts of hurting yourself, feeling depressed  If you notice these less serious side effects, talk with your doctor:   · Dizziness, drowsiness, sleepiness, tiredness  If you notice other side effects that you think are caused by this medicine, tell your doctor  Call your doctor for medical advice about side effects  You may report side effects to FDA at 8-146-FDA-1987  © 2017 [address] Information is for End User's use only and may not be sold, redistributed or otherwise used for commercial purposes  The above information is an  only  It is not intended as medical advice for individual conditions or treatments  Talk to your doctor, nurse or pharmacist before following any medical regimen to see if it is safe and effective for you  Acyclovir (By mouth)   Acyclovir (gw-UZZ-qbhr-vir)  Treats herpes virus infections, including herpes zoster (shingles) and genital herpes  Also treats chickenpox  This medicine will not cure herpes, but may prevent a breakout of herpes sores or blisters  Brand Name(s): Zovirax   There may be other brand names for this medicine  When This Medicine Should Not Be Used: You should not use this medicine if you have had an allergic reaction to acyclovir or to similar medicines such as famciclovir (Famvir®), ganciclovir (Cytovene®, Vitrasert®), valacyclovir (Valtrex®), or valganciclovir (Nathanel Jorge)  How to Use This Medicine:   Capsule, Tablet, Liquid  · Your doctor will tell you how much medicine to use  Do not use more than directed  · Take this medicine at the first sign of a herpes breakout, or as soon as possible after you are diagnosed with herpes zoster  The medicine may not work if you wait longer than 3 days to start using it  · You may take this medicine with or without food  · Drink extra fluids so you will urinate more often and help prevent kidney problems    · Shake the oral liquid thoroughly before each use  Measure the oral liquid medicine with a marked measuring spoon, oral syringe, or medicine cup  If a dose is missed:   · Take a dose as soon as you remember  If it is almost time for your next dose, wait until then and take a regular dose  Do not take extra medicine to make up for a missed dose  How to Store and Dispose of This Medicine:   · Store the medicine in a closed container at room temperature, away from heat, moisture, and direct light  · Ask your pharmacist, doctor, or health caregiver about the best way to dispose of any outdated medicine or medicine no longer needed  · Keep all medicine out of the reach of children  Never share your medicine with anyone  Drugs and Foods to Avoid:   Ask your doctor or pharmacist before using any other medicine, including over-the-counter medicines, vitamins, and herbal products  · Tell your doctor if you are receiving any medicines that weaken the immune system (such as steroids, chemotherapy, or radiation)  Warnings While Using This Medicine:   · Make sure your doctor knows if you are pregnant or breast feeding  Tell your doctor if you have kidney disease or a weak immune system  Some things that cause a weak immune system are cancer chemotherapy, HIV/AIDS, recent infection, or recent bone marrow or kidney transplant  · Acyclovir will not stop the spread of herpes during sex  Avoid having sex while you have herpes sores  · Even if you have no signs of a herpes infection, it is still possible to spread the virus to others during sex  Talk with your doctor about ways to keep from spreading the virus  · Call your doctor if your symptoms do not improve or if they get worse    Possible Side Effects While Using This Medicine:   Call your doctor right away if you notice any of these side effects:  · Allergic reaction: Itching or hives, swelling in your face or hands, swelling or tingling in your mouth or throat, chest tightness, trouble breathing  · Blistering, peeling, red skin rash  · Confusion, agitation, behavior changes  · Decrease in how much or how often you urinate  · Fainting or extreme weakness, problems with walking or coordination  · Pinpoint red spots on your skin  · Unexplained fever  · Unusual bleeding or bruising, blood in your urine or stools  · Yellowing of your skin or the whites of your eyes  If you notice these less serious side effects, talk with your doctor:   · Headache, muscle pain  · Nausea, vomiting, diarrhea, or upset stomach  · Nervousness or tired feeling  · Problems with vision  If you notice other side effects that you think are caused by this medicine, tell your doctor  Call your doctor for medical advice about side effects  You may report side effects to FDA at 6-972-FDA-3773  © 2017 [address]  Information is for End User's use only and may not be sold, redistributed or otherwise used for commercial purposes  The above information is an  only  It is not intended as medical advice for individual conditions or treatments  Talk to your doctor, nurse or pharmacist before following any medical regimen to see if it is safe and effective for you  Lidocaine Patch (On the skin)   Lidocaine ([id number])  Treats nerve pain that is caused by herpes zoster or shingles  Brand Name(s): DermacinRx PHN Roshan, DermacinRx ZRM Roshan, Dermazyl, Lidocaine Novaplus, Lidocare, Triangle, Xryliderm   There may be other brand names for this medicine  When This Medicine Should Not Be Used: This medicine is not right for everyone  Do not use it if you had an allergic reaction to lidocaine or similar medicines  How to Use This Medicine:   Patch  · Your doctor will tell you how many patches to use, where to apply them, and how often to apply them  Do not use more patches or apply them more often than your doctor tells you to  · This medicine should be used only on the skin   Do not get it in your eyes, nose, or mouth  If it does get on these areas, rinse it off with water or saline right away  · Keep the patch in its envelope until you are ready to put it on  You may cut the patch into a smaller size with scissors before you take off the patch liner  · Wash your hands with soap and water before and after applying a patch  · Apply the patch to clean, dry skin  Do not put the patch over burns, cuts, or irritated skin  · The patch will not stick if it gets wet  Do not wear it when you take a bath or shower, or when you go swimming  · Do not wear the patch for longer than 12 hours in any 24-hour period  · Store the patches at room temperature in a closed container, away from heat, moisture, and direct light  · Fold the used patch in half with the sticky sides together  Throw any used patch away so that children or pets cannot get to it  You will also need to throw away old patches after the expiration date has passed  Drugs and Foods to Avoid:   Ask your doctor or pharmacist before using any other medicine, including over-the-counter medicines, vitamins, and herbal products  · Some foods and medicines can affect how lidocaine works  Tell your doctor if you are using the followin [facility] for heart rhythm problems, such as mexiletine  ¨ Other topical medicine  Warnings While Using This Medicine:   · Tell your doctor if you are pregnant or breastfeeding, or if you have liver disease  Tell your doctor if you are allergic to any other medicine  · Do not use a heating pad, electric blanket, or other heat source over the patch     · Keep all medicine out of the reach of children  Never share your medicine with anyone    Possible Side Effects While Using This Medicine:   Call your doctor right away if you notice any of these side effects:  · Allergic reaction: Itching or hives, swelling in your face or hands, swelling or tingling in your mouth or throat, chest tightness, trouble breathing  · Redness, itching, burning, swelling, or blisters where the patch is applied  If you notice other side effects that you think are caused by this medicine, tell your doctor  Call your doctor for medical advice about side effects  You may report side effects to FDA at 3-807-FDA-2644  © 2017 [address] [employer] Information is for End User's use only and may not be sold, redistributed or otherwise used for commercial purposes  The above information is an  only  It is not intended as medical advice for individual conditions or treatments  Talk to your doctor, nurse or pharmacist before following any medical regimen to see if it is safe and effective for you

## 2018-03-13 LAB
BACTERIA BLD CULT: NORMAL
BACTERIA BLD CULT: NORMAL
PROCALCITONIN: 0.1 NG/ML (ref 0–0.08)

## 2018-05-07 ENCOUNTER — TRANSCRIBE ORDERS (OUTPATIENT)
Dept: ADMINISTRATIVE | Facility: HOSPITAL | Age: 68
End: 2018-05-07

## 2018-05-07 DIAGNOSIS — I11.0 HYPERTENSIVE HEART DISEASE WITH CONGESTIVE HEART FAILURE, UNSPECIFIED HEART FAILURE TYPE (HCC): ICD-10-CM

## 2018-05-07 DIAGNOSIS — I40.9 ACUTE MYOCARDITIS, UNSPECIFIED MYOCARDITIS TYPE: ICD-10-CM

## 2018-05-07 DIAGNOSIS — I73.00 RAYNAUD'S DISEASE WITHOUT GANGRENE: Primary | ICD-10-CM

## 2018-05-07 DIAGNOSIS — IMO0002 UNCONTROLLED TYPE 2 DIABETES MELLITUS WITH COMPLICATION, WITHOUT LONG-TERM CURRENT USE OF INSULIN: ICD-10-CM

## 2018-05-07 DIAGNOSIS — E78.5 HYPERLIPIDEMIA, UNSPECIFIED HYPERLIPIDEMIA TYPE: ICD-10-CM

## 2018-05-16 ENCOUNTER — HOSPITAL ENCOUNTER (OUTPATIENT)
Dept: RADIOLOGY | Facility: HOSPITAL | Age: 68
Discharge: HOME | End: 2018-05-16
Payer: MEDICARE

## 2018-05-16 DIAGNOSIS — I73.9 CLAUDICATION OF BOTH LOWER EXTREMITIES (HCC): ICD-10-CM

## 2018-05-16 DIAGNOSIS — I73.00 RAYNAUD'S DISEASE WITHOUT GANGRENE: ICD-10-CM

## 2018-05-16 DIAGNOSIS — IMO0002 UNCONTROLLED TYPE 2 DIABETES MELLITUS WITH COMPLICATION, WITHOUT LONG-TERM CURRENT USE OF INSULIN: ICD-10-CM

## 2018-05-16 DIAGNOSIS — R20.2 NUMBNESS AND TINGLING IN BOTH HANDS: ICD-10-CM

## 2018-05-16 DIAGNOSIS — I11.0 HYPERTENSIVE HEART DISEASE WITH CONGESTIVE HEART FAILURE, UNSPECIFIED HEART FAILURE TYPE (HCC): ICD-10-CM

## 2018-05-16 DIAGNOSIS — E78.5 HYPERLIPIDEMIA, UNSPECIFIED HYPERLIPIDEMIA TYPE: ICD-10-CM

## 2018-05-16 DIAGNOSIS — I40.9 ACUTE MYOCARDITIS, UNSPECIFIED MYOCARDITIS TYPE: ICD-10-CM

## 2018-05-16 DIAGNOSIS — R20.0 NUMBNESS AND TINGLING IN BOTH HANDS: ICD-10-CM

## 2018-05-16 PROCEDURE — 93930 UPPER EXTREMITY STUDY: CPT

## 2018-05-16 PROCEDURE — 93925 LOWER EXTREMITY STUDY: CPT

## 2018-05-16 PROCEDURE — 93922 UPR/L XTREMITY ART 2 LEVELS: CPT | Performed by: SURGERY

## 2018-05-16 PROCEDURE — 93923 UPR/LXTR ART STDY 3+ LVLS: CPT

## 2018-05-16 PROCEDURE — 93925 LOWER EXTREMITY STUDY: CPT | Performed by: SURGERY

## 2018-05-17 PROCEDURE — 93930 UPPER EXTREMITY STUDY: CPT | Performed by: SURGERY

## 2018-07-15 ENCOUNTER — APPOINTMENT (EMERGENCY)
Dept: RADIOLOGY | Facility: HOSPITAL | Age: 68
End: 2018-07-15
Payer: MEDICARE

## 2018-07-15 ENCOUNTER — HOSPITAL ENCOUNTER (EMERGENCY)
Facility: HOSPITAL | Age: 68
Discharge: HOME | End: 2018-07-15
Attending: EMERGENCY MEDICINE | Admitting: EMERGENCY MEDICINE
Payer: MEDICARE

## 2018-07-15 VITALS
RESPIRATION RATE: 18 BRPM | TEMPERATURE: 100.3 F | SYSTOLIC BLOOD PRESSURE: 107 MMHG | OXYGEN SATURATION: 98 % | HEART RATE: 69 BPM | WEIGHT: 252 LBS | BODY MASS INDEX: 46.09 KG/M2 | DIASTOLIC BLOOD PRESSURE: 51 MMHG

## 2018-07-15 DIAGNOSIS — S30.0XXA CONTUSION OF PELVIC REGION, INITIAL ENCOUNTER: Primary | ICD-10-CM

## 2018-07-15 PROCEDURE — 72170 X-RAY EXAM OF PELVIS: CPT

## 2018-07-15 PROCEDURE — 73564 X-RAY EXAM KNEE 4 OR MORE: CPT

## 2018-07-15 PROCEDURE — 99283 EMERGENCY DEPT VISIT LOW MDM: CPT

## 2018-07-15 PROCEDURE — 72100 X-RAY EXAM L-S SPINE 2/3 VWS: CPT

## 2018-07-15 PROCEDURE — 72220 X-RAY EXAM SACRUM TAILBONE: CPT

## 2018-07-15 RX ORDER — HYDROCODONE BITARTRATE AND ACETAMINOPHEN 5; 325 MG/1; MG/1
1 TABLET ORAL ONCE
Status: COMPLETED | OUTPATIENT
Start: 2018-07-15 | End: 2018-07-15

## 2018-07-15 RX ORDER — PREGABALIN 100 MG/1
100 CAPSULE ORAL 2 TIMES DAILY
COMMUNITY
End: 2022-06-14 | Stop reason: SDUPTHER

## 2018-07-15 RX ADMIN — HYDROCODONE BITARTRATE AND ACETAMINOPHEN 1 TABLET: 5; 325 TABLET ORAL at 17:20

## 2018-07-15 NOTE — ED PROVIDER NOTES
History  Chief Complaint   Patient presents with    Tailbone Pain     Patient fell backwards landing on tailbone, c/o pain in tailbone and right knee     82-year-old female presents with complaints of tailbone pain and right knee pain after she was walking yesterday lost her balance fell back landed on her buttocks  No loss of consciousness patient not on anticoagulants other than aspirin  Denies any headache or neck pain no nausea vomiting diarrhea no other noticeable injuries none complained of  History provided by:  Patient   used: No        Prior to Admission Medications   Prescriptions Last Dose Informant Patient Reported? Taking?    Dulaglutide (TRULICITY) 1 5 EQ/2 7IH SOPN 7/14/2018 at Unknown time  Yes Yes   Sig: Inject 1 5 mg under the skin once a week   Insulin Glargine (TOUJEO SOLOSTAR) injection pen 300 units/mL 7/15/2018 at 0830  Yes Yes   Sig: Inject 40 Units under the skin 2 (two) times a day   albuterol (PROVENTIL HFA,VENTOLIN HFA) 90 mcg/act inhaler More than a month at Unknown time  Yes No   Sig: Inhale 2 puffs every 6 (six) hours as needed for wheezing   aspirin (ECOTRIN LOW STRENGTH) 81 mg EC tablet 7/15/2018 at 0830  Yes Yes   Sig: Take 81 mg by mouth daily   insulin lispro (HumaLOG) 100 units/mL injection Past Month at Unknown time  Yes Yes   Sig: Inject 10 Units under the skin 3 (three) times a day before meals     lisinopril (ZESTRIL) 10 mg tablet 7/14/2018 at 1700  Yes Yes   Sig: Take 10 mg by mouth daily   metFORMIN (GLUCOPHAGE) 500 mg tablet 7/15/2018 at 0830  Yes Yes   Sig: Take 500 mg by mouth 2 (two) times a day with meals   metoprolol tartrate (LOPRESSOR) 50 mg tablet 7/15/2018 at 0830  No Yes   Sig: Take 1 tablet (50 mg total) by mouth every 12 (twelve) hours   montelukast (SINGULAIR) 10 mg tablet 7/15/2018 at 1700 Self Yes Yes   Sig: Take 10 mg by mouth daily     pregabalin (LYRICA) 100 mg capsule 7/15/2018 at 0830  Yes Yes   Sig: Take 50 mg by mouth 2 (two) times a day   ranitidine (ZANTAC) 150 mg tablet Unknown at Unknown time Self Yes No   Sig: Take 150 mg by mouth 2 (two) times a day as needed     rosuvastatin (CRESTOR) 20 MG tablet Unknown at Unknown time  Yes No   Sig: Take 20 mg by mouth daily   torsemide (DEMADEX) 10 mg tablet 7/15/2018 at 1700  Yes Yes   Sig: Take 10 mg by mouth daily      Facility-Administered Medications: None       Past Medical History:   Diagnosis Date    Asthma     Diabetes mellitus ([address] )     GERD (gastroesophageal reflux disease)     Hyperlipidemia     Hypertension     Kidney stones     PONV (postoperative nausea and vomiting)        Past Surgical History:   Procedure Laterality Date    APPENDECTOMY      CYSTOSCOPY W/ LASER LITHOTRIPSY Left 7/12/2016    Procedure: CYSTOSCOPY URETEROSCOPY WITH LITHOTRIPSY HOLMIUM LASER, RETROGRADE PYELOGRAM AND INSERTION STENT URETERAL;  Surgeon: Cam Briones MD;  Location: [address] [place] [address];  Service:     DILATION AND CURETTAGE OF UTERUS      JOINT REPLACEMENT      right knee    KNEE ARTHROPLASTY Right     [place] CYSTOURETHROSCOPY,URETER CATHETER Left 6/29/2016    Procedure: CYSTOSCOPY RETROGRADE PYELOGRAM WITH INSERTION STENT URETERAL, left;  Surgeon: Cam Briones MD;  Location: [facility];  Service: Urology    SHOULDER ARTHROTOMY Left        History reviewed  No pertinent family history  I have reviewed and agree with the history as documented  Social History   Substance Use Topics    Smoking status: Former Smoker     Packs/day: 1 00     Years: 20 00     Types: Cigarettes     Quit date: 7/8/1996    Smokeless tobacco: Never Used    Alcohol use Yes      Comment: rarely        Review of Systems   Constitutional: Negative for activity change, chills, diaphoresis and fever  HENT: Negative for congestion, ear pain, nosebleeds, sore throat, trouble swallowing and voice change  Eyes: Negative for pain, discharge and redness     Respiratory: Negative for apnea, cough, choking, shortness of breath, wheezing and stridor  Cardiovascular: Negative for chest pain and palpitations  Gastrointestinal: Negative for abdominal distention, abdominal pain, constipation, diarrhea, nausea and vomiting  Endocrine: Negative for polydipsia  Genitourinary: Negative for difficulty urinating, dysuria, flank pain, frequency, hematuria and urgency  Musculoskeletal: Positive for arthralgias and back pain  Negative for gait problem, joint swelling, myalgias, neck pain and neck stiffness  Skin: Negative for pallor and rash  Neurological: Negative for dizziness, tremors, syncope, speech difficulty, weakness, numbness and headaches  Hematological: Negative for adenopathy  Psychiatric/Behavioral: Negative for confusion, hallucinations, self-injury and suicidal ideas  The patient is not nervous/anxious  Physical Exam  Physical Exam   Constitutional: She is oriented to person, place, and time  Vital signs are normal  She appears well-developed and well-nourished  HENT:   Head: Normocephalic and atraumatic  Right Ear: External ear normal    Left Ear: External ear normal    Nose: Nose normal    Mouth/Throat: Oropharynx is clear and moist    Eyes: Conjunctivae and EOM are normal  Pupils are equal, round, and reactive to light  Neck: Normal range of motion  Neck supple  Cardiovascular: Normal rate, regular rhythm, normal heart sounds and intact distal pulses  Pulmonary/Chest: Effort normal and breath sounds normal    Abdominal: Soft  Bowel sounds are normal    Musculoskeletal: She exhibits edema and tenderness  Neurological: She is alert and oriented to person, place, and time  Skin: Skin is warm  Psychiatric: She has a normal mood and affect  Nursing note and vitals reviewed        Vital Signs  ED Triage Vitals [07/15/18 1651]   Temperature Pulse Respirations Blood Pressure SpO2   100 3 °F (37 9 °C) 88 18 145/76 99 %      Temp Source Heart Rate Source Patient Position - Orthostatic VS BP Location FiO2 (%)   Tympanic Monitor Standing Right arm --      Pain Score       Worst Possible Pain           Vitals:    07/15/18 1651 07/15/18 1808   BP: 145/76 107/51   Pulse: 88 69   Patient Position - Orthostatic VS: Standing Lying       Visual Acuity      ED Medications  Medications   HYDROcodone-acetaminophen (NORCO) 5-325 mg per tablet 1 tablet (1 tablet Oral Given 7/15/18 1720)       Diagnostic Studies  Results Reviewed     None                 XR knee 4+ views Right injury   Final Result by Lakeshia Eid MD (07/15 1826)      Unremarkable appearance of the right knee following total joint replacement  Workstation performed: [id number]         XR lumbar spine 2 or 3 views   Final Result by Lakeshia Eid MD (07/15 1824)      No evidence of fracture or other acute bony abnormality in the lumbar spine  Workstation performed: [id number]         XR sacrum and coccyx   Final Result by Lakeshia Eid MD (07/15 1823)      No fracture  Workstation performed: [id number]         XR pelvis ap only 1 or 2 views   Final Result by Lakeshia Eid MD (07/15 1821)      No acute osseous abnormality  Workstation performed: [id number]                    Procedures  Procedures       Phone Contacts  ED Phone Contact    ED Course                               MDM  CritCare Time    Disposition  Final diagnoses:   Contusion of pelvic region, initial encounter     Time reflects when diagnosis was documented in both MDM as applicable and the Disposition within this note     Time User Action Codes Description Comment    7/15/2018  6:32 PM Serafin Aguilar Add [S30  0XXA] Contusion of pelvic region, initial encounter       ED Disposition     ED Disposition Condition Comment    Discharge  Jourdan Rojas discharge to home/self care      Condition at discharge: Stable        Follow-up Information     Follow up With Specialties Details Why Contact Attila Mancera Schedule an appointment as soon as possible for a visit  [address] [facility]  [phone]            Patient's Medications   Discharge Prescriptions    No medications on file     No discharge procedures on file      ED Provider  Electronically Signed by           Kiersten Rose DO  07/15/18 6399

## 2018-07-15 NOTE — DISCHARGE INSTRUCTIONS

## 2018-07-15 NOTE — ED NOTES
Patient back from [facility], [address] [facility]  07/15/18 5484

## 2019-05-25 ENCOUNTER — HOSPITAL ENCOUNTER (INPATIENT)
Facility: HOSPITAL | Age: 69
LOS: 5 days | Discharge: HOME | DRG: 871 | End: 2019-05-30
Attending: EMERGENCY MEDICINE | Admitting: INTERNAL MEDICINE
Payer: MEDICARE

## 2019-05-25 ENCOUNTER — APPOINTMENT (EMERGENCY)
Dept: RADIOLOGY | Facility: HOSPITAL | Age: 69
DRG: 871 | End: 2019-05-25
Payer: MEDICARE

## 2019-05-25 DIAGNOSIS — N39.0 UTI (URINARY TRACT INFECTION): ICD-10-CM

## 2019-05-25 DIAGNOSIS — A41.9 SEPSIS (HCC): Primary | ICD-10-CM

## 2019-05-25 DIAGNOSIS — K59.00 CONSTIPATION: ICD-10-CM

## 2019-05-25 DIAGNOSIS — I48.0 PAROXYSMAL ATRIAL FIBRILLATION (HCC): ICD-10-CM

## 2019-05-25 DIAGNOSIS — R60.0 LOWER LEG EDEMA: ICD-10-CM

## 2019-05-25 DIAGNOSIS — I47.1 SVT (SUPRAVENTRICULAR TACHYCARDIA) (HCC): ICD-10-CM

## 2019-05-25 PROBLEM — R41.82 ALTERED MENTAL STATUS: Status: ACTIVE | Noted: 2019-05-25

## 2019-05-25 PROBLEM — R19.7 DIARRHEA: Status: ACTIVE | Noted: 2019-05-25

## 2019-05-25 PROBLEM — E83.42 HYPOMAGNESEMIA: Status: ACTIVE | Noted: 2019-05-25

## 2019-05-25 PROBLEM — E83.39 HYPOPHOSPHATEMIA: Status: ACTIVE | Noted: 2019-05-25

## 2019-05-25 LAB
ALBUMIN SERPL BCP-MCNC: 3.5 G/DL (ref 3.5–5)
ALP SERPL-CCNC: 58 U/L (ref 46–116)
ALT SERPL W P-5'-P-CCNC: 18 U/L (ref 12–78)
ANION GAP SERPL CALCULATED.3IONS-SCNC: 9 MMOL/L (ref 4–13)
APTT PPP: 29 SECONDS (ref 24–33)
AST SERPL W P-5'-P-CCNC: 12 U/L (ref 5–45)
BACTERIA UR QL AUTO: ABNORMAL /HPF
BASOPHILS # BLD AUTO: 0.08 THOUSANDS/ΜL (ref 0–0.1)
BASOPHILS NFR BLD AUTO: 1 % (ref 0–1)
BILIRUB SERPL-MCNC: 0.7 MG/DL (ref 0.2–1)
BILIRUB UR QL STRIP: NEGATIVE
BUN SERPL-MCNC: 22 MG/DL (ref 5–25)
CALCIUM SERPL-MCNC: 8.1 MG/DL (ref 8.3–10.1)
CHLORIDE SERPL-SCNC: 97 MMOL/L (ref 100–108)
CLARITY UR: CLEAR
CO2 SERPL-SCNC: 28 MMOL/L (ref 21–32)
COLOR UR: ABNORMAL
CREAT SERPL-MCNC: 1.36 MG/DL (ref 0.6–1.3)
EOSINOPHIL # BLD AUTO: 0.05 THOUSAND/ΜL (ref 0–0.61)
EOSINOPHIL NFR BLD AUTO: 0 % (ref 0–6)
ERYTHROCYTE [DISTWIDTH] IN BLOOD BY AUTOMATED COUNT: 14 % (ref 11.6–15.1)
GFR SERPL CREATININE-BSD FRML MDRD: 41 ML/MIN/1.73SQ M
GLUCOSE SERPL-MCNC: 179 MG/DL (ref 65–140)
GLUCOSE SERPL-MCNC: 186 MG/DL (ref 65–140)
GLUCOSE SERPL-MCNC: 283 MG/DL (ref 65–140)
GLUCOSE UR STRIP-MCNC: NEGATIVE MG/DL
HCT VFR BLD AUTO: 39.3 % (ref 34.8–46.1)
HGB BLD-MCNC: 12.6 G/DL (ref 11.5–15.4)
HGB UR QL STRIP.AUTO: ABNORMAL
IMM GRANULOCYTES # BLD AUTO: 0.1 THOUSAND/UL (ref 0–0.2)
IMM GRANULOCYTES NFR BLD AUTO: 1 % (ref 0–2)
INR PPP: 0.94 (ref 0.86–1.16)
KETONES UR STRIP-MCNC: NEGATIVE MG/DL
LACTATE SERPL-SCNC: 1.6 MMOL/L (ref 0.5–2)
LACTATE SERPL-SCNC: 2.3 MMOL/L (ref 0.5–2)
LEUKOCYTE ESTERASE UR QL STRIP: ABNORMAL
LIPASE SERPL-CCNC: 139 U/L (ref 73–393)
LYMPHOCYTES # BLD AUTO: 1.09 THOUSANDS/ΜL (ref 0.6–4.47)
LYMPHOCYTES NFR BLD AUTO: 7 % (ref 14–44)
MAGNESIUM SERPL-MCNC: 1.1 MG/DL (ref 1.6–2.6)
MCH RBC QN AUTO: 29.4 PG (ref 26.8–34.3)
MCHC RBC AUTO-ENTMCNC: 32.1 G/DL (ref 31.4–37.4)
MCV RBC AUTO: 92 FL (ref 82–98)
MONOCYTES # BLD AUTO: 1.04 THOUSAND/ΜL (ref 0.17–1.22)
MONOCYTES NFR BLD AUTO: 6 % (ref 4–12)
NEUTROPHILS # BLD AUTO: 14.08 THOUSANDS/ΜL (ref 1.85–7.62)
NEUTS SEG NFR BLD AUTO: 85 % (ref 43–75)
NITRITE UR QL STRIP: NEGATIVE
NON-SQ EPI CELLS URNS QL MICRO: ABNORMAL /HPF
NRBC BLD AUTO-RTO: 0 /100 WBCS
OTHER STN SPEC: ABNORMAL
PH UR STRIP.AUTO: 5.5 [PH]
PHOSPHATE SERPL-MCNC: 2 MG/DL (ref 2.3–4.1)
PLATELET # BLD AUTO: 175 THOUSANDS/UL (ref 149–390)
PMV BLD AUTO: 10.2 FL (ref 8.9–12.7)
POTASSIUM SERPL-SCNC: 4.3 MMOL/L (ref 3.5–5.3)
PROT SERPL-MCNC: 7.5 G/DL (ref 6.4–8.2)
PROT UR STRIP-MCNC: NEGATIVE MG/DL
PROTHROMBIN TIME: 9.9 SECONDS (ref 9.4–11.7)
RBC # BLD AUTO: 4.29 MILLION/UL (ref 3.81–5.12)
RBC #/AREA URNS AUTO: ABNORMAL /HPF
SODIUM SERPL-SCNC: 134 MMOL/L (ref 136–145)
SP GR UR STRIP.AUTO: 1.01 (ref 1–1.03)
TROPONIN I SERPL-MCNC: <0.02 NG/ML
UROBILINOGEN UR QL STRIP.AUTO: 0.2 E.U./DL
WBC # BLD AUTO: 16.44 THOUSAND/UL (ref 4.31–10.16)
WBC #/AREA URNS AUTO: ABNORMAL /HPF

## 2019-05-25 PROCEDURE — 93005 ELECTROCARDIOGRAM TRACING: CPT

## 2019-05-25 PROCEDURE — 85610 PROTHROMBIN TIME: CPT | Performed by: EMERGENCY MEDICINE

## 2019-05-25 PROCEDURE — 71045 X-RAY EXAM CHEST 1 VIEW: CPT

## 2019-05-25 PROCEDURE — 99285 EMERGENCY DEPT VISIT HI MDM: CPT

## 2019-05-25 PROCEDURE — 84100 ASSAY OF PHOSPHORUS: CPT | Performed by: EMERGENCY MEDICINE

## 2019-05-25 PROCEDURE — 99223 1ST HOSP IP/OBS HIGH 75: CPT | Performed by: INTERNAL MEDICINE

## 2019-05-25 PROCEDURE — 83605 ASSAY OF LACTIC ACID: CPT | Performed by: EMERGENCY MEDICINE

## 2019-05-25 PROCEDURE — 87081 CULTURE SCREEN ONLY: CPT | Performed by: INTERNAL MEDICINE

## 2019-05-25 PROCEDURE — 1124F ACP DISCUSS-NO DSCNMKR DOCD: CPT | Performed by: INTERNAL MEDICINE

## 2019-05-25 PROCEDURE — 82948 REAGENT STRIP/BLOOD GLUCOSE: CPT

## 2019-05-25 PROCEDURE — 87040 BLOOD CULTURE FOR BACTERIA: CPT | Performed by: EMERGENCY MEDICINE

## 2019-05-25 PROCEDURE — 84484 ASSAY OF TROPONIN QUANT: CPT | Performed by: EMERGENCY MEDICINE

## 2019-05-25 PROCEDURE — 85025 COMPLETE CBC W/AUTO DIFF WBC: CPT | Performed by: EMERGENCY MEDICINE

## 2019-05-25 PROCEDURE — 87086 URINE CULTURE/COLONY COUNT: CPT | Performed by: INTERNAL MEDICINE

## 2019-05-25 PROCEDURE — 83690 ASSAY OF LIPASE: CPT | Performed by: EMERGENCY MEDICINE

## 2019-05-25 PROCEDURE — 74177 CT ABD & PELVIS W/CONTRAST: CPT

## 2019-05-25 PROCEDURE — 96365 THER/PROPH/DIAG IV INF INIT: CPT

## 2019-05-25 PROCEDURE — 85730 THROMBOPLASTIN TIME PARTIAL: CPT | Performed by: EMERGENCY MEDICINE

## 2019-05-25 PROCEDURE — 96375 TX/PRO/DX INJ NEW DRUG ADDON: CPT

## 2019-05-25 PROCEDURE — 96361 HYDRATE IV INFUSION ADD-ON: CPT

## 2019-05-25 PROCEDURE — 70450 CT HEAD/BRAIN W/O DYE: CPT

## 2019-05-25 PROCEDURE — 81001 URINALYSIS AUTO W/SCOPE: CPT | Performed by: EMERGENCY MEDICINE

## 2019-05-25 PROCEDURE — 80053 COMPREHEN METABOLIC PANEL: CPT | Performed by: EMERGENCY MEDICINE

## 2019-05-25 PROCEDURE — 36415 COLL VENOUS BLD VENIPUNCTURE: CPT | Performed by: EMERGENCY MEDICINE

## 2019-05-25 PROCEDURE — 83735 ASSAY OF MAGNESIUM: CPT | Performed by: EMERGENCY MEDICINE

## 2019-05-25 RX ORDER — CEFAZOLIN SODIUM 2 G/50ML
2000 SOLUTION INTRAVENOUS EVERY 8 HOURS
Status: DISCONTINUED | OUTPATIENT
Start: 2019-05-25 | End: 2019-05-30 | Stop reason: HOSPADM

## 2019-05-25 RX ORDER — MAGNESIUM SULFATE HEPTAHYDRATE 40 MG/ML
2 INJECTION, SOLUTION INTRAVENOUS ONCE
Status: COMPLETED | OUTPATIENT
Start: 2019-05-25 | End: 2019-05-26

## 2019-05-25 RX ORDER — ACETAMINOPHEN 325 MG/1
650 TABLET ORAL ONCE
Status: COMPLETED | OUTPATIENT
Start: 2019-05-25 | End: 2019-05-25

## 2019-05-25 RX ORDER — INSULIN GLARGINE 100 [IU]/ML
40 INJECTION, SOLUTION SUBCUTANEOUS EVERY 12 HOURS SCHEDULED
Status: DISCONTINUED | OUTPATIENT
Start: 2019-05-25 | End: 2019-05-30 | Stop reason: HOSPADM

## 2019-05-25 RX ORDER — FAMOTIDINE 20 MG/1
20 TABLET, FILM COATED ORAL DAILY
Status: DISCONTINUED | OUTPATIENT
Start: 2019-05-26 | End: 2019-05-30 | Stop reason: HOSPADM

## 2019-05-25 RX ORDER — CEFTRIAXONE 1 G/50ML
1000 INJECTION, SOLUTION INTRAVENOUS ONCE
Status: COMPLETED | OUTPATIENT
Start: 2019-05-25 | End: 2019-05-25

## 2019-05-25 RX ORDER — PREGABALIN 50 MG/1
50 CAPSULE ORAL 2 TIMES DAILY
Status: DISCONTINUED | OUTPATIENT
Start: 2019-05-25 | End: 2019-05-30 | Stop reason: HOSPADM

## 2019-05-25 RX ORDER — ASPIRIN 81 MG/1
81 TABLET ORAL DAILY
Status: DISCONTINUED | OUTPATIENT
Start: 2019-05-26 | End: 2019-05-29

## 2019-05-25 RX ORDER — METOPROLOL TARTRATE 50 MG/1
50 TABLET, FILM COATED ORAL EVERY 12 HOURS SCHEDULED
Status: DISCONTINUED | OUTPATIENT
Start: 2019-05-25 | End: 2019-05-27

## 2019-05-25 RX ORDER — ATORVASTATIN CALCIUM 40 MG/1
40 TABLET, FILM COATED ORAL
Status: DISCONTINUED | OUTPATIENT
Start: 2019-05-26 | End: 2019-05-30 | Stop reason: HOSPADM

## 2019-05-25 RX ORDER — ONDANSETRON 2 MG/ML
4 INJECTION INTRAMUSCULAR; INTRAVENOUS EVERY 6 HOURS PRN
Status: DISCONTINUED | OUTPATIENT
Start: 2019-05-25 | End: 2019-05-30 | Stop reason: HOSPADM

## 2019-05-25 RX ORDER — MONTELUKAST SODIUM 10 MG/1
10 TABLET ORAL DAILY
Status: DISCONTINUED | OUTPATIENT
Start: 2019-05-26 | End: 2019-05-30 | Stop reason: HOSPADM

## 2019-05-25 RX ORDER — ACETAMINOPHEN 325 MG/1
325 TABLET ORAL ONCE AS NEEDED
Status: COMPLETED | OUTPATIENT
Start: 2019-05-25 | End: 2019-05-25

## 2019-05-25 RX ORDER — SACCHAROMYCES BOULARDII 250 MG
250 CAPSULE ORAL 2 TIMES DAILY
Status: DISCONTINUED | OUTPATIENT
Start: 2019-05-25 | End: 2019-05-30 | Stop reason: HOSPADM

## 2019-05-25 RX ORDER — MAGNESIUM SULFATE HEPTAHYDRATE 40 MG/ML
2 INJECTION, SOLUTION INTRAVENOUS ONCE
Status: COMPLETED | OUTPATIENT
Start: 2019-05-25 | End: 2019-05-25

## 2019-05-25 RX ORDER — ACETAMINOPHEN 325 MG/1
650 TABLET ORAL EVERY 6 HOURS PRN
Status: DISCONTINUED | OUTPATIENT
Start: 2019-05-25 | End: 2019-05-30 | Stop reason: HOSPADM

## 2019-05-25 RX ORDER — ONDANSETRON 2 MG/ML
4 INJECTION INTRAMUSCULAR; INTRAVENOUS ONCE
Status: COMPLETED | OUTPATIENT
Start: 2019-05-25 | End: 2019-05-25

## 2019-05-25 RX ORDER — SODIUM CHLORIDE 9 MG/ML
125 INJECTION, SOLUTION INTRAVENOUS CONTINUOUS
Status: DISCONTINUED | OUTPATIENT
Start: 2019-05-25 | End: 2019-05-26

## 2019-05-25 RX ADMIN — SODIUM CHLORIDE 125 ML/HR: 0.9 INJECTION, SOLUTION INTRAVENOUS at 19:58

## 2019-05-25 RX ADMIN — INSULIN LISPRO 2 UNITS: 100 INJECTION, SOLUTION INTRAVENOUS; SUBCUTANEOUS at 21:05

## 2019-05-25 RX ADMIN — METOPROLOL TARTRATE 50 MG: 50 TABLET, FILM COATED ORAL at 21:08

## 2019-05-25 RX ADMIN — ACETAMINOPHEN 650 MG: 325 TABLET, FILM COATED ORAL at 15:30

## 2019-05-25 RX ADMIN — INSULIN GLARGINE 40 UNITS: 100 INJECTION, SOLUTION SUBCUTANEOUS at 21:05

## 2019-05-25 RX ADMIN — ONDANSETRON 4 MG: 2 INJECTION INTRAMUSCULAR; INTRAVENOUS at 14:49

## 2019-05-25 RX ADMIN — Medication 250 MG: at 19:00

## 2019-05-25 RX ADMIN — MAGNESIUM SULFATE HEPTAHYDRATE 2 G: 40 INJECTION, SOLUTION INTRAVENOUS at 19:00

## 2019-05-25 RX ADMIN — ENOXAPARIN SODIUM 40 MG: 40 INJECTION SUBCUTANEOUS at 21:04

## 2019-05-25 RX ADMIN — IOHEXOL 100 ML: 350 INJECTION, SOLUTION INTRAVENOUS at 16:25

## 2019-05-25 RX ADMIN — SODIUM CHLORIDE 1000 ML: 0.9 INJECTION, SOLUTION INTRAVENOUS at 14:42

## 2019-05-25 RX ADMIN — CEFTRIAXONE 1000 MG: 1 INJECTION, SOLUTION INTRAVENOUS at 15:30

## 2019-05-25 RX ADMIN — PREGABALIN 50 MG: 50 CAPSULE ORAL at 19:00

## 2019-05-25 RX ADMIN — ONDANSETRON 4 MG: 2 INJECTION INTRAMUSCULAR; INTRAVENOUS at 20:50

## 2019-05-25 RX ADMIN — SODIUM PHOSPHATE, MONOBASIC, MONOHYDRATE 21 MMOL: 276; 142 INJECTION, SOLUTION INTRAVENOUS at 19:58

## 2019-05-25 RX ADMIN — ACETAMINOPHEN 650 MG: 325 TABLET, FILM COATED ORAL at 19:53

## 2019-05-25 RX ADMIN — MAGNESIUM SULFATE HEPTAHYDRATE 2 G: 40 INJECTION, SOLUTION INTRAVENOUS at 16:53

## 2019-05-25 RX ADMIN — ACETAMINOPHEN 325 MG: 325 TABLET, FILM COATED ORAL at 23:43

## 2019-05-25 RX ADMIN — CEFAZOLIN SODIUM 2000 MG: 2 SOLUTION INTRAVENOUS at 19:57

## 2019-05-26 PROBLEM — E83.39 HYPOPHOSPHATEMIA: Status: RESOLVED | Noted: 2019-05-25 | Resolved: 2019-05-26

## 2019-05-26 PROBLEM — E83.42 HYPOMAGNESEMIA: Status: RESOLVED | Noted: 2019-05-25 | Resolved: 2019-05-26

## 2019-05-26 LAB
ANION GAP SERPL CALCULATED.3IONS-SCNC: 10 MMOL/L (ref 4–13)
BASOPHILS # BLD AUTO: 0.06 THOUSANDS/ΜL (ref 0–0.1)
BASOPHILS NFR BLD AUTO: 0 % (ref 0–1)
BUN SERPL-MCNC: 19 MG/DL (ref 5–25)
CALCIUM SERPL-MCNC: 7.3 MG/DL (ref 8.3–10.1)
CHLORIDE SERPL-SCNC: 100 MMOL/L (ref 100–108)
CO2 SERPL-SCNC: 24 MMOL/L (ref 21–32)
CREAT SERPL-MCNC: 1.42 MG/DL (ref 0.6–1.3)
EOSINOPHIL # BLD AUTO: 0 THOUSAND/ΜL (ref 0–0.61)
EOSINOPHIL NFR BLD AUTO: 0 % (ref 0–6)
ERYTHROCYTE [DISTWIDTH] IN BLOOD BY AUTOMATED COUNT: 14.2 % (ref 11.6–15.1)
GFR SERPL CREATININE-BSD FRML MDRD: 39 ML/MIN/1.73SQ M
GLUCOSE SERPL-MCNC: 116 MG/DL (ref 65–140)
GLUCOSE SERPL-MCNC: 178 MG/DL (ref 65–140)
GLUCOSE SERPL-MCNC: 184 MG/DL (ref 65–140)
GLUCOSE SERPL-MCNC: 187 MG/DL (ref 65–140)
GLUCOSE SERPL-MCNC: 195 MG/DL (ref 65–140)
HCT VFR BLD AUTO: 34.1 % (ref 34.8–46.1)
HGB BLD-MCNC: 10.9 G/DL (ref 11.5–15.4)
IMM GRANULOCYTES # BLD AUTO: 0.11 THOUSAND/UL (ref 0–0.2)
IMM GRANULOCYTES NFR BLD AUTO: 1 % (ref 0–2)
LYMPHOCYTES # BLD AUTO: 1.14 THOUSANDS/ΜL (ref 0.6–4.47)
LYMPHOCYTES NFR BLD AUTO: 8 % (ref 14–44)
MAGNESIUM SERPL-MCNC: 2 MG/DL (ref 1.6–2.6)
MCH RBC QN AUTO: 30 PG (ref 26.8–34.3)
MCHC RBC AUTO-ENTMCNC: 32 G/DL (ref 31.4–37.4)
MCV RBC AUTO: 94 FL (ref 82–98)
MONOCYTES # BLD AUTO: 0.89 THOUSAND/ΜL (ref 0.17–1.22)
MONOCYTES NFR BLD AUTO: 6 % (ref 4–12)
NEUTROPHILS # BLD AUTO: 12.5 THOUSANDS/ΜL (ref 1.85–7.62)
NEUTS SEG NFR BLD AUTO: 85 % (ref 43–75)
NRBC BLD AUTO-RTO: 0 /100 WBCS
PHOSPHATE SERPL-MCNC: 3.2 MG/DL (ref 2.3–4.1)
PLATELET # BLD AUTO: 154 THOUSANDS/UL (ref 149–390)
PMV BLD AUTO: 10.3 FL (ref 8.9–12.7)
POTASSIUM SERPL-SCNC: 4 MMOL/L (ref 3.5–5.3)
PROCALCITONIN SERPL-MCNC: 4.79 NG/ML
RBC # BLD AUTO: 3.63 MILLION/UL (ref 3.81–5.12)
SODIUM SERPL-SCNC: 134 MMOL/L (ref 136–145)
WBC # BLD AUTO: 14.7 THOUSAND/UL (ref 4.31–10.16)

## 2019-05-26 PROCEDURE — 80048 BASIC METABOLIC PNL TOTAL CA: CPT | Performed by: INTERNAL MEDICINE

## 2019-05-26 PROCEDURE — 83735 ASSAY OF MAGNESIUM: CPT | Performed by: INTERNAL MEDICINE

## 2019-05-26 PROCEDURE — 82948 REAGENT STRIP/BLOOD GLUCOSE: CPT

## 2019-05-26 PROCEDURE — 84100 ASSAY OF PHOSPHORUS: CPT | Performed by: INTERNAL MEDICINE

## 2019-05-26 PROCEDURE — 93005 ELECTROCARDIOGRAM TRACING: CPT

## 2019-05-26 PROCEDURE — 99232 SBSQ HOSP IP/OBS MODERATE 35: CPT | Performed by: INTERNAL MEDICINE

## 2019-05-26 PROCEDURE — 85025 COMPLETE CBC W/AUTO DIFF WBC: CPT | Performed by: INTERNAL MEDICINE

## 2019-05-26 PROCEDURE — 84145 PROCALCITONIN (PCT): CPT | Performed by: INTERNAL MEDICINE

## 2019-05-26 RX ORDER — ACETAMINOPHEN 325 MG/1
325 TABLET ORAL ONCE AS NEEDED
Status: COMPLETED | OUTPATIENT
Start: 2019-05-26 | End: 2019-05-26

## 2019-05-26 RX ADMIN — SODIUM CHLORIDE 125 ML/HR: 0.9 INJECTION, SOLUTION INTRAVENOUS at 14:19

## 2019-05-26 RX ADMIN — CEFAZOLIN SODIUM 2000 MG: 2 SOLUTION INTRAVENOUS at 11:40

## 2019-05-26 RX ADMIN — ATORVASTATIN CALCIUM 40 MG: 40 TABLET, FILM COATED ORAL at 16:51

## 2019-05-26 RX ADMIN — INSULIN GLARGINE 40 UNITS: 100 INJECTION, SOLUTION SUBCUTANEOUS at 09:17

## 2019-05-26 RX ADMIN — ONDANSETRON 4 MG: 2 INJECTION INTRAMUSCULAR; INTRAVENOUS at 14:25

## 2019-05-26 RX ADMIN — ACETAMINOPHEN 650 MG: 325 TABLET, FILM COATED ORAL at 18:43

## 2019-05-26 RX ADMIN — INSULIN GLARGINE 40 UNITS: 100 INJECTION, SOLUTION SUBCUTANEOUS at 21:23

## 2019-05-26 RX ADMIN — INSULIN LISPRO 2 UNITS: 100 INJECTION, SOLUTION INTRAVENOUS; SUBCUTANEOUS at 11:41

## 2019-05-26 RX ADMIN — FAMOTIDINE 20 MG: 20 TABLET ORAL at 09:16

## 2019-05-26 RX ADMIN — CEFAZOLIN SODIUM 2000 MG: 2 SOLUTION INTRAVENOUS at 18:39

## 2019-05-26 RX ADMIN — METOPROLOL TARTRATE 50 MG: 50 TABLET, FILM COATED ORAL at 09:16

## 2019-05-26 RX ADMIN — CEFAZOLIN SODIUM 2000 MG: 2 SOLUTION INTRAVENOUS at 02:56

## 2019-05-26 RX ADMIN — INSULIN LISPRO 2 UNITS: 100 INJECTION, SOLUTION INTRAVENOUS; SUBCUTANEOUS at 09:17

## 2019-05-26 RX ADMIN — Medication 250 MG: at 09:16

## 2019-05-26 RX ADMIN — INSULIN LISPRO 10 UNITS: 100 INJECTION, SOLUTION INTRAVENOUS; SUBCUTANEOUS at 11:41

## 2019-05-26 RX ADMIN — ACETAMINOPHEN 650 MG: 325 TABLET, FILM COATED ORAL at 05:58

## 2019-05-26 RX ADMIN — Medication 250 MG: at 18:39

## 2019-05-26 RX ADMIN — SODIUM CHLORIDE 125 ML/HR: 0.9 INJECTION, SOLUTION INTRAVENOUS at 05:49

## 2019-05-26 RX ADMIN — INSULIN LISPRO 1 UNITS: 100 INJECTION, SOLUTION INTRAVENOUS; SUBCUTANEOUS at 21:27

## 2019-05-26 RX ADMIN — PREGABALIN 50 MG: 50 CAPSULE ORAL at 18:39

## 2019-05-26 RX ADMIN — ENOXAPARIN SODIUM 40 MG: 40 INJECTION SUBCUTANEOUS at 09:16

## 2019-05-26 RX ADMIN — INSULIN LISPRO 10 UNITS: 100 INJECTION, SOLUTION INTRAVENOUS; SUBCUTANEOUS at 09:16

## 2019-05-26 RX ADMIN — ENOXAPARIN SODIUM 40 MG: 40 INJECTION SUBCUTANEOUS at 21:19

## 2019-05-26 RX ADMIN — ACETAMINOPHEN 325 MG: 325 TABLET, FILM COATED ORAL at 06:05

## 2019-05-26 RX ADMIN — ASPIRIN 81 MG: 81 TABLET, COATED ORAL at 09:16

## 2019-05-26 RX ADMIN — METOPROLOL TARTRATE 50 MG: 50 TABLET, FILM COATED ORAL at 21:22

## 2019-05-26 RX ADMIN — PREGABALIN 50 MG: 50 CAPSULE ORAL at 09:16

## 2019-05-26 RX ADMIN — MONTELUKAST SODIUM 10 MG: 10 TABLET, FILM COATED ORAL at 09:16

## 2019-05-27 PROBLEM — R60.0 LOWER LEG EDEMA: Status: ACTIVE | Noted: 2019-05-27

## 2019-05-27 LAB
ANION GAP SERPL CALCULATED.3IONS-SCNC: 10 MMOL/L (ref 4–13)
BACTERIA UR CULT: NORMAL
BASOPHILS # BLD AUTO: 0.08 THOUSANDS/ΜL (ref 0–0.1)
BASOPHILS NFR BLD AUTO: 1 % (ref 0–1)
BUN SERPL-MCNC: 19 MG/DL (ref 5–25)
CALCIUM SERPL-MCNC: 7.5 MG/DL (ref 8.3–10.1)
CHLORIDE SERPL-SCNC: 105 MMOL/L (ref 100–108)
CO2 SERPL-SCNC: 24 MMOL/L (ref 21–32)
CREAT SERPL-MCNC: 1.41 MG/DL (ref 0.6–1.3)
EOSINOPHIL # BLD AUTO: 0.1 THOUSAND/ΜL (ref 0–0.61)
EOSINOPHIL NFR BLD AUTO: 1 % (ref 0–6)
ERYTHROCYTE [DISTWIDTH] IN BLOOD BY AUTOMATED COUNT: 14.5 % (ref 11.6–15.1)
GFR SERPL CREATININE-BSD FRML MDRD: 39 ML/MIN/1.73SQ M
GLUCOSE SERPL-MCNC: 167 MG/DL (ref 65–140)
GLUCOSE SERPL-MCNC: 194 MG/DL (ref 65–140)
GLUCOSE SERPL-MCNC: 92 MG/DL (ref 65–140)
GLUCOSE SERPL-MCNC: 96 MG/DL (ref 65–140)
HCT VFR BLD AUTO: 35.5 % (ref 34.8–46.1)
HGB BLD-MCNC: 11.1 G/DL (ref 11.5–15.4)
IMM GRANULOCYTES # BLD AUTO: 0.15 THOUSAND/UL (ref 0–0.2)
IMM GRANULOCYTES NFR BLD AUTO: 1 % (ref 0–2)
LYMPHOCYTES # BLD AUTO: 1.92 THOUSANDS/ΜL (ref 0.6–4.47)
LYMPHOCYTES NFR BLD AUTO: 16 % (ref 14–44)
MCH RBC QN AUTO: 29.9 PG (ref 26.8–34.3)
MCHC RBC AUTO-ENTMCNC: 31.3 G/DL (ref 31.4–37.4)
MCV RBC AUTO: 96 FL (ref 82–98)
MONOCYTES # BLD AUTO: 0.85 THOUSAND/ΜL (ref 0.17–1.22)
MONOCYTES NFR BLD AUTO: 7 % (ref 4–12)
MRSA NOSE QL CULT: NORMAL
NEUTROPHILS # BLD AUTO: 9.07 THOUSANDS/ΜL (ref 1.85–7.62)
NEUTS SEG NFR BLD AUTO: 74 % (ref 43–75)
NRBC BLD AUTO-RTO: 0 /100 WBCS
PLATELET # BLD AUTO: 169 THOUSANDS/UL (ref 149–390)
PMV BLD AUTO: 10.8 FL (ref 8.9–12.7)
POTASSIUM SERPL-SCNC: 4 MMOL/L (ref 3.5–5.3)
RBC # BLD AUTO: 3.71 MILLION/UL (ref 3.81–5.12)
SODIUM SERPL-SCNC: 139 MMOL/L (ref 136–145)
WBC # BLD AUTO: 12.17 THOUSAND/UL (ref 4.31–10.16)

## 2019-05-27 PROCEDURE — 85025 COMPLETE CBC W/AUTO DIFF WBC: CPT | Performed by: INTERNAL MEDICINE

## 2019-05-27 PROCEDURE — 99232 SBSQ HOSP IP/OBS MODERATE 35: CPT | Performed by: INTERNAL MEDICINE

## 2019-05-27 PROCEDURE — 99222 1ST HOSP IP/OBS MODERATE 55: CPT | Performed by: INTERNAL MEDICINE

## 2019-05-27 PROCEDURE — 82948 REAGENT STRIP/BLOOD GLUCOSE: CPT

## 2019-05-27 PROCEDURE — 80048 BASIC METABOLIC PNL TOTAL CA: CPT | Performed by: INTERNAL MEDICINE

## 2019-05-27 RX ORDER — FUROSEMIDE 10 MG/ML
40 INJECTION INTRAMUSCULAR; INTRAVENOUS DAILY
Status: DISCONTINUED | OUTPATIENT
Start: 2019-05-27 | End: 2019-05-30 | Stop reason: HOSPADM

## 2019-05-27 RX ADMIN — CEFAZOLIN SODIUM 2000 MG: 2 SOLUTION INTRAVENOUS at 02:55

## 2019-05-27 RX ADMIN — Medication 250 MG: at 09:14

## 2019-05-27 RX ADMIN — INSULIN GLARGINE 40 UNITS: 100 INJECTION, SOLUTION SUBCUTANEOUS at 09:07

## 2019-05-27 RX ADMIN — CEFAZOLIN SODIUM 2000 MG: 2 SOLUTION INTRAVENOUS at 18:38

## 2019-05-27 RX ADMIN — Medication 250 MG: at 18:37

## 2019-05-27 RX ADMIN — ATORVASTATIN CALCIUM 40 MG: 40 TABLET, FILM COATED ORAL at 16:39

## 2019-05-27 RX ADMIN — METOPROLOL TARTRATE 50 MG: 50 TABLET, FILM COATED ORAL at 09:08

## 2019-05-27 RX ADMIN — FUROSEMIDE 40 MG: 10 INJECTION, SOLUTION INTRAMUSCULAR; INTRAVENOUS at 12:02

## 2019-05-27 RX ADMIN — MONTELUKAST SODIUM 10 MG: 10 TABLET, FILM COATED ORAL at 09:08

## 2019-05-27 RX ADMIN — INSULIN LISPRO 2 UNITS: 100 INJECTION, SOLUTION INTRAVENOUS; SUBCUTANEOUS at 12:07

## 2019-05-27 RX ADMIN — INSULIN LISPRO 10 UNITS: 100 INJECTION, SOLUTION INTRAVENOUS; SUBCUTANEOUS at 09:06

## 2019-05-27 RX ADMIN — METOPROLOL TARTRATE 75 MG: 25 TABLET, FILM COATED ORAL at 21:14

## 2019-05-27 RX ADMIN — ENOXAPARIN SODIUM 40 MG: 40 INJECTION SUBCUTANEOUS at 21:15

## 2019-05-27 RX ADMIN — FAMOTIDINE 20 MG: 20 TABLET ORAL at 09:08

## 2019-05-27 RX ADMIN — INSULIN LISPRO 10 UNITS: 100 INJECTION, SOLUTION INTRAVENOUS; SUBCUTANEOUS at 12:07

## 2019-05-27 RX ADMIN — PREGABALIN 50 MG: 50 CAPSULE ORAL at 09:08

## 2019-05-27 RX ADMIN — ENOXAPARIN SODIUM 40 MG: 40 INJECTION SUBCUTANEOUS at 09:09

## 2019-05-27 RX ADMIN — INSULIN LISPRO 1 UNITS: 100 INJECTION, SOLUTION INTRAVENOUS; SUBCUTANEOUS at 21:14

## 2019-05-27 RX ADMIN — CEFAZOLIN SODIUM 2000 MG: 2 SOLUTION INTRAVENOUS at 12:05

## 2019-05-27 RX ADMIN — ASPIRIN 81 MG: 81 TABLET, COATED ORAL at 09:08

## 2019-05-27 RX ADMIN — PREGABALIN 50 MG: 50 CAPSULE ORAL at 18:37

## 2019-05-27 RX ADMIN — INSULIN LISPRO 2 UNITS: 100 INJECTION, SOLUTION INTRAVENOUS; SUBCUTANEOUS at 16:38

## 2019-05-27 RX ADMIN — INSULIN GLARGINE 40 UNITS: 100 INJECTION, SOLUTION SUBCUTANEOUS at 21:10

## 2019-05-27 RX ADMIN — INSULIN LISPRO 10 UNITS: 100 INJECTION, SOLUTION INTRAVENOUS; SUBCUTANEOUS at 16:38

## 2019-05-28 ENCOUNTER — APPOINTMENT (INPATIENT)
Dept: NON INVASIVE DIAGNOSTICS | Facility: HOSPITAL | Age: 69
DRG: 871 | End: 2019-05-28
Payer: MEDICARE

## 2019-05-28 PROBLEM — R41.82 ALTERED MENTAL STATUS: Status: RESOLVED | Noted: 2019-05-25 | Resolved: 2019-05-28

## 2019-05-28 PROBLEM — I48.0 PAROXYSMAL ATRIAL FIBRILLATION (HCC): Status: ACTIVE | Noted: 2019-05-28

## 2019-05-28 PROBLEM — R19.7 DIARRHEA: Status: RESOLVED | Noted: 2019-05-25 | Resolved: 2019-05-28

## 2019-05-28 LAB
ANION GAP SERPL CALCULATED.3IONS-SCNC: 10 MMOL/L (ref 4–13)
ATRIAL RATE: 120 BPM
ATRIAL RATE: 91 BPM
BASOPHILS # BLD AUTO: 0.08 THOUSANDS/ΜL (ref 0–0.1)
BASOPHILS NFR BLD AUTO: 1 % (ref 0–1)
BUN SERPL-MCNC: 18 MG/DL (ref 5–25)
CALCIUM SERPL-MCNC: 7.9 MG/DL (ref 8.3–10.1)
CHLORIDE SERPL-SCNC: 105 MMOL/L (ref 100–108)
CO2 SERPL-SCNC: 25 MMOL/L (ref 21–32)
CREAT SERPL-MCNC: 1.31 MG/DL (ref 0.6–1.3)
EOSINOPHIL # BLD AUTO: 0.42 THOUSAND/ΜL (ref 0–0.61)
EOSINOPHIL NFR BLD AUTO: 5 % (ref 0–6)
ERYTHROCYTE [DISTWIDTH] IN BLOOD BY AUTOMATED COUNT: 14.4 % (ref 11.6–15.1)
GFR SERPL CREATININE-BSD FRML MDRD: 42 ML/MIN/1.73SQ M
GLUCOSE SERPL-MCNC: 106 MG/DL (ref 65–140)
GLUCOSE SERPL-MCNC: 109 MG/DL (ref 65–140)
GLUCOSE SERPL-MCNC: 143 MG/DL (ref 65–140)
GLUCOSE SERPL-MCNC: 144 MG/DL (ref 65–140)
GLUCOSE SERPL-MCNC: 166 MG/DL (ref 65–140)
GLUCOSE SERPL-MCNC: 209 MG/DL (ref 65–140)
HCT VFR BLD AUTO: 34.8 % (ref 34.8–46.1)
HGB BLD-MCNC: 10.7 G/DL (ref 11.5–15.4)
IMM GRANULOCYTES # BLD AUTO: 0.05 THOUSAND/UL (ref 0–0.2)
IMM GRANULOCYTES NFR BLD AUTO: 1 % (ref 0–2)
LYMPHOCYTES # BLD AUTO: 1.91 THOUSANDS/ΜL (ref 0.6–4.47)
LYMPHOCYTES NFR BLD AUTO: 23 % (ref 14–44)
MAGNESIUM SERPL-MCNC: 2.4 MG/DL (ref 1.6–2.6)
MCH RBC QN AUTO: 29.2 PG (ref 26.8–34.3)
MCHC RBC AUTO-ENTMCNC: 30.7 G/DL (ref 31.4–37.4)
MCV RBC AUTO: 95 FL (ref 82–98)
MONOCYTES # BLD AUTO: 0.75 THOUSAND/ΜL (ref 0.17–1.22)
MONOCYTES NFR BLD AUTO: 9 % (ref 4–12)
NEUTROPHILS # BLD AUTO: 4.96 THOUSANDS/ΜL (ref 1.85–7.62)
NEUTS SEG NFR BLD AUTO: 61 % (ref 43–75)
NRBC BLD AUTO-RTO: 0 /100 WBCS
P AXIS: 28 DEGREES
P AXIS: 42 DEGREES
PLATELET # BLD AUTO: 185 THOUSANDS/UL (ref 149–390)
PMV BLD AUTO: 10.4 FL (ref 8.9–12.7)
POTASSIUM SERPL-SCNC: 4.2 MMOL/L (ref 3.5–5.3)
PR INTERVAL: 158 MS
PR INTERVAL: 160 MS
QRS AXIS: -20 DEGREES
QRS AXIS: -33 DEGREES
QRSD INTERVAL: 72 MS
QRSD INTERVAL: 72 MS
QT INTERVAL: 314 MS
QT INTERVAL: 368 MS
QTC INTERVAL: 443 MS
QTC INTERVAL: 452 MS
RBC # BLD AUTO: 3.66 MILLION/UL (ref 3.81–5.12)
SODIUM SERPL-SCNC: 140 MMOL/L (ref 136–145)
T WAVE AXIS: 23 DEGREES
T WAVE AXIS: 54 DEGREES
VENTRICULAR RATE: 120 BPM
VENTRICULAR RATE: 91 BPM
WBC # BLD AUTO: 8.17 THOUSAND/UL (ref 4.31–10.16)

## 2019-05-28 PROCEDURE — 85025 COMPLETE CBC W/AUTO DIFF WBC: CPT | Performed by: INTERNAL MEDICINE

## 2019-05-28 PROCEDURE — 93010 ELECTROCARDIOGRAM REPORT: CPT | Performed by: INTERNAL MEDICINE

## 2019-05-28 PROCEDURE — 82948 REAGENT STRIP/BLOOD GLUCOSE: CPT

## 2019-05-28 PROCEDURE — 94762 N-INVAS EAR/PLS OXIMTRY CONT: CPT

## 2019-05-28 PROCEDURE — 99232 SBSQ HOSP IP/OBS MODERATE 35: CPT | Performed by: INTERNAL MEDICINE

## 2019-05-28 PROCEDURE — 80048 BASIC METABOLIC PNL TOTAL CA: CPT | Performed by: INTERNAL MEDICINE

## 2019-05-28 PROCEDURE — 83735 ASSAY OF MAGNESIUM: CPT | Performed by: INTERNAL MEDICINE

## 2019-05-28 RX ORDER — METOPROLOL TARTRATE 100 MG/1
100 TABLET ORAL EVERY 12 HOURS SCHEDULED
Status: DISCONTINUED | OUTPATIENT
Start: 2019-05-28 | End: 2019-05-30 | Stop reason: HOSPADM

## 2019-05-28 RX ORDER — FUROSEMIDE 10 MG/ML
40 INJECTION INTRAMUSCULAR; INTRAVENOUS
Status: DISCONTINUED | OUTPATIENT
Start: 2019-05-28 | End: 2019-05-29

## 2019-05-28 RX ADMIN — CEFAZOLIN SODIUM 2000 MG: 2 SOLUTION INTRAVENOUS at 04:15

## 2019-05-28 RX ADMIN — ACETAMINOPHEN 650 MG: 325 TABLET, FILM COATED ORAL at 06:01

## 2019-05-28 RX ADMIN — PREGABALIN 50 MG: 50 CAPSULE ORAL at 08:32

## 2019-05-28 RX ADMIN — METOPROLOL TARTRATE 75 MG: 25 TABLET, FILM COATED ORAL at 08:20

## 2019-05-28 RX ADMIN — ATORVASTATIN CALCIUM 40 MG: 40 TABLET, FILM COATED ORAL at 15:44

## 2019-05-28 RX ADMIN — ENOXAPARIN SODIUM 40 MG: 40 INJECTION SUBCUTANEOUS at 21:07

## 2019-05-28 RX ADMIN — INSULIN LISPRO 10 UNITS: 100 INJECTION, SOLUTION INTRAVENOUS; SUBCUTANEOUS at 17:44

## 2019-05-28 RX ADMIN — Medication 250 MG: at 17:45

## 2019-05-28 RX ADMIN — INSULIN LISPRO 2 UNITS: 100 INJECTION, SOLUTION INTRAVENOUS; SUBCUTANEOUS at 12:01

## 2019-05-28 RX ADMIN — MONTELUKAST SODIUM 10 MG: 10 TABLET, FILM COATED ORAL at 08:20

## 2019-05-28 RX ADMIN — CEFAZOLIN SODIUM 2000 MG: 2 SOLUTION INTRAVENOUS at 11:58

## 2019-05-28 RX ADMIN — METOPROLOL TARTRATE 100 MG: 100 TABLET, FILM COATED ORAL at 21:12

## 2019-05-28 RX ADMIN — INSULIN GLARGINE 40 UNITS: 100 INJECTION, SOLUTION SUBCUTANEOUS at 08:20

## 2019-05-28 RX ADMIN — INSULIN GLARGINE 40 UNITS: 100 INJECTION, SOLUTION SUBCUTANEOUS at 21:08

## 2019-05-28 RX ADMIN — INSULIN LISPRO 10 UNITS: 100 INJECTION, SOLUTION INTRAVENOUS; SUBCUTANEOUS at 08:21

## 2019-05-28 RX ADMIN — Medication 250 MG: at 08:20

## 2019-05-28 RX ADMIN — PREGABALIN 50 MG: 50 CAPSULE ORAL at 17:44

## 2019-05-28 RX ADMIN — FAMOTIDINE 20 MG: 20 TABLET ORAL at 08:20

## 2019-05-28 RX ADMIN — ASPIRIN 81 MG: 81 TABLET, COATED ORAL at 08:20

## 2019-05-28 RX ADMIN — INSULIN LISPRO 10 UNITS: 100 INJECTION, SOLUTION INTRAVENOUS; SUBCUTANEOUS at 12:01

## 2019-05-28 RX ADMIN — ENOXAPARIN SODIUM 40 MG: 40 INJECTION SUBCUTANEOUS at 08:21

## 2019-05-28 RX ADMIN — FUROSEMIDE 40 MG: 10 INJECTION, SOLUTION INTRAMUSCULAR; INTRAVENOUS at 08:20

## 2019-05-28 RX ADMIN — ACETAMINOPHEN 650 MG: 325 TABLET, FILM COATED ORAL at 15:44

## 2019-05-28 RX ADMIN — FUROSEMIDE 40 MG: 10 INJECTION, SOLUTION INTRAMUSCULAR; INTRAVENOUS at 18:41

## 2019-05-28 RX ADMIN — CEFAZOLIN SODIUM 2000 MG: 2 SOLUTION INTRAVENOUS at 18:42

## 2019-05-29 ENCOUNTER — APPOINTMENT (INPATIENT)
Dept: NON INVASIVE DIAGNOSTICS | Facility: HOSPITAL | Age: 69
DRG: 871 | End: 2019-05-29
Payer: MEDICARE

## 2019-05-29 ENCOUNTER — APPOINTMENT (INPATIENT)
Dept: RADIOLOGY | Facility: HOSPITAL | Age: 69
DRG: 871 | End: 2019-05-29
Payer: MEDICARE

## 2019-05-29 LAB
ANION GAP SERPL CALCULATED.3IONS-SCNC: 8 MMOL/L (ref 4–13)
BASOPHILS # BLD AUTO: 0.07 THOUSANDS/ΜL (ref 0–0.1)
BASOPHILS NFR BLD AUTO: 1 % (ref 0–1)
BUN SERPL-MCNC: 21 MG/DL (ref 5–25)
CALCIUM SERPL-MCNC: 8.5 MG/DL (ref 8.3–10.1)
CHLORIDE SERPL-SCNC: 104 MMOL/L (ref 100–108)
CO2 SERPL-SCNC: 29 MMOL/L (ref 21–32)
CREAT SERPL-MCNC: 1.29 MG/DL (ref 0.6–1.3)
EOSINOPHIL # BLD AUTO: 0.52 THOUSAND/ΜL (ref 0–0.61)
EOSINOPHIL NFR BLD AUTO: 7 % (ref 0–6)
ERYTHROCYTE [DISTWIDTH] IN BLOOD BY AUTOMATED COUNT: 14.3 % (ref 11.6–15.1)
GFR SERPL CREATININE-BSD FRML MDRD: 43 ML/MIN/1.73SQ M
GLUCOSE SERPL-MCNC: 126 MG/DL (ref 65–140)
GLUCOSE SERPL-MCNC: 139 MG/DL (ref 65–140)
GLUCOSE SERPL-MCNC: 167 MG/DL (ref 65–140)
GLUCOSE SERPL-MCNC: 69 MG/DL (ref 65–140)
GLUCOSE SERPL-MCNC: 75 MG/DL (ref 65–140)
HCT VFR BLD AUTO: 36.3 % (ref 34.8–46.1)
HGB BLD-MCNC: 11.3 G/DL (ref 11.5–15.4)
IMM GRANULOCYTES # BLD AUTO: 0.06 THOUSAND/UL (ref 0–0.2)
IMM GRANULOCYTES NFR BLD AUTO: 1 % (ref 0–2)
LYMPHOCYTES # BLD AUTO: 1.98 THOUSANDS/ΜL (ref 0.6–4.47)
LYMPHOCYTES NFR BLD AUTO: 25 % (ref 14–44)
MCH RBC QN AUTO: 29.4 PG (ref 26.8–34.3)
MCHC RBC AUTO-ENTMCNC: 31.1 G/DL (ref 31.4–37.4)
MCV RBC AUTO: 95 FL (ref 82–98)
MONOCYTES # BLD AUTO: 0.78 THOUSAND/ΜL (ref 0.17–1.22)
MONOCYTES NFR BLD AUTO: 10 % (ref 4–12)
NEUTROPHILS # BLD AUTO: 4.37 THOUSANDS/ΜL (ref 1.85–7.62)
NEUTS SEG NFR BLD AUTO: 56 % (ref 43–75)
NRBC BLD AUTO-RTO: 0 /100 WBCS
PLATELET # BLD AUTO: 191 THOUSANDS/UL (ref 149–390)
PMV BLD AUTO: 10.5 FL (ref 8.9–12.7)
POTASSIUM SERPL-SCNC: 4 MMOL/L (ref 3.5–5.3)
RBC # BLD AUTO: 3.84 MILLION/UL (ref 3.81–5.12)
SODIUM SERPL-SCNC: 141 MMOL/L (ref 136–145)
WBC # BLD AUTO: 7.78 THOUSAND/UL (ref 4.31–10.16)

## 2019-05-29 PROCEDURE — 93306 TTE W/DOPPLER COMPLETE: CPT | Performed by: INTERNAL MEDICINE

## 2019-05-29 PROCEDURE — 93306 TTE W/DOPPLER COMPLETE: CPT

## 2019-05-29 PROCEDURE — 99232 SBSQ HOSP IP/OBS MODERATE 35: CPT | Performed by: INTERNAL MEDICINE

## 2019-05-29 PROCEDURE — 93970 EXTREMITY STUDY: CPT | Performed by: SURGERY

## 2019-05-29 PROCEDURE — 93970 EXTREMITY STUDY: CPT

## 2019-05-29 PROCEDURE — 82948 REAGENT STRIP/BLOOD GLUCOSE: CPT

## 2019-05-29 PROCEDURE — 80048 BASIC METABOLIC PNL TOTAL CA: CPT | Performed by: INTERNAL MEDICINE

## 2019-05-29 PROCEDURE — 85025 COMPLETE CBC W/AUTO DIFF WBC: CPT | Performed by: INTERNAL MEDICINE

## 2019-05-29 RX ORDER — LISINOPRIL 10 MG/1
10 TABLET ORAL DAILY
Status: DISCONTINUED | OUTPATIENT
Start: 2019-05-29 | End: 2019-05-30 | Stop reason: HOSPADM

## 2019-05-29 RX ADMIN — ASPIRIN 81 MG: 81 TABLET, COATED ORAL at 09:37

## 2019-05-29 RX ADMIN — PREGABALIN 50 MG: 50 CAPSULE ORAL at 09:37

## 2019-05-29 RX ADMIN — Medication 250 MG: at 09:37

## 2019-05-29 RX ADMIN — CEFAZOLIN SODIUM 2000 MG: 2 SOLUTION INTRAVENOUS at 11:48

## 2019-05-29 RX ADMIN — INSULIN GLARGINE 40 UNITS: 100 INJECTION, SOLUTION SUBCUTANEOUS at 21:56

## 2019-05-29 RX ADMIN — Medication 250 MG: at 17:05

## 2019-05-29 RX ADMIN — INSULIN LISPRO 10 UNITS: 100 INJECTION, SOLUTION INTRAVENOUS; SUBCUTANEOUS at 17:05

## 2019-05-29 RX ADMIN — CEFAZOLIN SODIUM 2000 MG: 2 SOLUTION INTRAVENOUS at 03:13

## 2019-05-29 RX ADMIN — CEFAZOLIN SODIUM 2000 MG: 2 SOLUTION INTRAVENOUS at 19:32

## 2019-05-29 RX ADMIN — APIXABAN 5 MG: 5 TABLET, FILM COATED ORAL at 17:06

## 2019-05-29 RX ADMIN — METOPROLOL TARTRATE 100 MG: 100 TABLET, FILM COATED ORAL at 20:56

## 2019-05-29 RX ADMIN — METOPROLOL TARTRATE 100 MG: 100 TABLET, FILM COATED ORAL at 09:42

## 2019-05-29 RX ADMIN — FAMOTIDINE 20 MG: 20 TABLET ORAL at 09:37

## 2019-05-29 RX ADMIN — INSULIN GLARGINE 40 UNITS: 100 INJECTION, SOLUTION SUBCUTANEOUS at 09:38

## 2019-05-29 RX ADMIN — FUROSEMIDE 40 MG: 10 INJECTION, SOLUTION INTRAMUSCULAR; INTRAVENOUS at 09:37

## 2019-05-29 RX ADMIN — INSULIN LISPRO 10 UNITS: 100 INJECTION, SOLUTION INTRAVENOUS; SUBCUTANEOUS at 11:51

## 2019-05-29 RX ADMIN — ENOXAPARIN SODIUM 40 MG: 40 INJECTION SUBCUTANEOUS at 09:38

## 2019-05-29 RX ADMIN — MONTELUKAST SODIUM 10 MG: 10 TABLET, FILM COATED ORAL at 09:38

## 2019-05-29 RX ADMIN — PREGABALIN 50 MG: 50 CAPSULE ORAL at 17:06

## 2019-05-29 RX ADMIN — APIXABAN 5 MG: 5 TABLET, FILM COATED ORAL at 11:56

## 2019-05-29 RX ADMIN — INSULIN LISPRO 2 UNITS: 100 INJECTION, SOLUTION INTRAVENOUS; SUBCUTANEOUS at 12:01

## 2019-05-29 RX ADMIN — ATORVASTATIN CALCIUM 40 MG: 40 TABLET, FILM COATED ORAL at 17:05

## 2019-05-29 RX ADMIN — LISINOPRIL 10 MG: 10 TABLET ORAL at 11:56

## 2019-05-30 VITALS
WEIGHT: 251 LBS | BODY MASS INDEX: 46.19 KG/M2 | OXYGEN SATURATION: 93 % | RESPIRATION RATE: 20 BRPM | SYSTOLIC BLOOD PRESSURE: 135 MMHG | HEIGHT: 62 IN | TEMPERATURE: 98.3 F | HEART RATE: 70 BPM | DIASTOLIC BLOOD PRESSURE: 64 MMHG

## 2019-05-30 LAB
ANION GAP SERPL CALCULATED.3IONS-SCNC: 5 MMOL/L (ref 4–13)
BACTERIA BLD CULT: NORMAL
BACTERIA BLD CULT: NORMAL
BUN SERPL-MCNC: 23 MG/DL (ref 5–25)
CALCIUM SERPL-MCNC: 8.6 MG/DL (ref 8.3–10.1)
CHLORIDE SERPL-SCNC: 103 MMOL/L (ref 100–108)
CO2 SERPL-SCNC: 32 MMOL/L (ref 21–32)
CREAT SERPL-MCNC: 1.26 MG/DL (ref 0.6–1.3)
GFR SERPL CREATININE-BSD FRML MDRD: 45 ML/MIN/1.73SQ M
GLUCOSE SERPL-MCNC: 105 MG/DL (ref 65–140)
GLUCOSE SERPL-MCNC: 194 MG/DL (ref 65–140)
GLUCOSE SERPL-MCNC: 259 MG/DL (ref 65–140)
GLUCOSE SERPL-MCNC: 89 MG/DL (ref 65–140)
MAGNESIUM SERPL-MCNC: 1.7 MG/DL (ref 1.6–2.6)
POTASSIUM SERPL-SCNC: 3.8 MMOL/L (ref 3.5–5.3)
SODIUM SERPL-SCNC: 140 MMOL/L (ref 136–145)

## 2019-05-30 PROCEDURE — 82948 REAGENT STRIP/BLOOD GLUCOSE: CPT

## 2019-05-30 PROCEDURE — 99239 HOSP IP/OBS DSCHRG MGMT >30: CPT | Performed by: INTERNAL MEDICINE

## 2019-05-30 PROCEDURE — 83735 ASSAY OF MAGNESIUM: CPT | Performed by: INTERNAL MEDICINE

## 2019-05-30 PROCEDURE — 80048 BASIC METABOLIC PNL TOTAL CA: CPT | Performed by: INTERNAL MEDICINE

## 2019-05-30 PROCEDURE — 99232 SBSQ HOSP IP/OBS MODERATE 35: CPT | Performed by: INTERNAL MEDICINE

## 2019-05-30 RX ORDER — METOPROLOL TARTRATE 100 MG/1
100 TABLET ORAL EVERY 12 HOURS SCHEDULED
Qty: 60 TABLET | Refills: 0 | Status: ON HOLD | OUTPATIENT
Start: 2019-05-30 | End: 2019-08-01 | Stop reason: SDUPTHER

## 2019-05-30 RX ORDER — TORSEMIDE 10 MG/1
20 TABLET ORAL DAILY
Refills: 0
Start: 2019-05-30 | End: 2019-06-11

## 2019-05-30 RX ORDER — CEFADROXIL 500 MG/1
1000 CAPSULE ORAL EVERY 12 HOURS SCHEDULED
Qty: 20 CAPSULE | Refills: 0 | Status: SHIPPED | OUTPATIENT
Start: 2019-05-30 | End: 2019-06-04

## 2019-05-30 RX ORDER — CEFADROXIL 500 MG/1
1000 CAPSULE ORAL EVERY 12 HOURS SCHEDULED
Qty: 10 CAPSULE | Refills: 0 | Status: SHIPPED | OUTPATIENT
Start: 2019-05-30 | End: 2019-05-30 | Stop reason: SDUPTHER

## 2019-05-30 RX ORDER — DOCUSATE SODIUM 100 MG/1
100 CAPSULE, LIQUID FILLED ORAL 2 TIMES DAILY
Qty: 10 CAPSULE | Refills: 0 | Status: SHIPPED | OUTPATIENT
Start: 2019-05-30 | End: 2019-07-29

## 2019-05-30 RX ORDER — SACCHAROMYCES BOULARDII 250 MG
250 CAPSULE ORAL 2 TIMES DAILY
Qty: 10 CAPSULE | Refills: 0 | Status: SHIPPED | OUTPATIENT
Start: 2019-05-30 | End: 2022-02-17

## 2019-05-30 RX ORDER — DOCUSATE SODIUM 100 MG/1
100 CAPSULE, LIQUID FILLED ORAL 2 TIMES DAILY
Status: DISCONTINUED | OUTPATIENT
Start: 2019-05-30 | End: 2019-05-30 | Stop reason: HOSPADM

## 2019-05-30 RX ADMIN — METOPROLOL TARTRATE 100 MG: 100 TABLET, FILM COATED ORAL at 08:53

## 2019-05-30 RX ADMIN — FUROSEMIDE 40 MG: 10 INJECTION, SOLUTION INTRAMUSCULAR; INTRAVENOUS at 08:53

## 2019-05-30 RX ADMIN — MONTELUKAST SODIUM 10 MG: 10 TABLET, FILM COATED ORAL at 08:53

## 2019-05-30 RX ADMIN — INSULIN GLARGINE 40 UNITS: 100 INJECTION, SOLUTION SUBCUTANEOUS at 08:54

## 2019-05-30 RX ADMIN — APIXABAN 5 MG: 5 TABLET, FILM COATED ORAL at 08:55

## 2019-05-30 RX ADMIN — Medication 250 MG: at 08:53

## 2019-05-30 RX ADMIN — INSULIN LISPRO 2 UNITS: 100 INJECTION, SOLUTION INTRAVENOUS; SUBCUTANEOUS at 11:39

## 2019-05-30 RX ADMIN — CEFAZOLIN SODIUM 2000 MG: 2 SOLUTION INTRAVENOUS at 03:59

## 2019-05-30 RX ADMIN — CEFAZOLIN SODIUM 2000 MG: 2 SOLUTION INTRAVENOUS at 11:21

## 2019-05-30 RX ADMIN — DOCUSATE SODIUM 100 MG: 100 CAPSULE, LIQUID FILLED ORAL at 08:52

## 2019-05-30 RX ADMIN — FAMOTIDINE 20 MG: 20 TABLET ORAL at 08:53

## 2019-05-30 RX ADMIN — PREGABALIN 50 MG: 50 CAPSULE ORAL at 08:54

## 2019-05-30 RX ADMIN — LISINOPRIL 10 MG: 10 TABLET ORAL at 08:53

## 2019-05-30 RX ADMIN — INSULIN LISPRO 10 UNITS: 100 INJECTION, SOLUTION INTRAVENOUS; SUBCUTANEOUS at 11:39

## 2019-06-03 ENCOUNTER — EPISODE CHANGES (OUTPATIENT)
Dept: CASE MANAGEMENT | Facility: HOSPITAL | Age: 69
End: 2019-06-03

## 2019-06-04 ENCOUNTER — PATIENT OUTREACH (OUTPATIENT)
Dept: CASE MANAGEMENT | Facility: OTHER | Age: 69
End: 2019-06-04

## 2019-06-05 ENCOUNTER — OFFICE VISIT (OUTPATIENT)
Dept: PULMONOLOGY | Facility: MEDICAL CENTER | Age: 69
End: 2019-06-05
Payer: MEDICARE

## 2019-06-05 VITALS
DIASTOLIC BLOOD PRESSURE: 82 MMHG | HEART RATE: 93 BPM | WEIGHT: 249 LBS | BODY MASS INDEX: 45.82 KG/M2 | SYSTOLIC BLOOD PRESSURE: 126 MMHG | OXYGEN SATURATION: 94 % | HEIGHT: 62 IN | RESPIRATION RATE: 12 BRPM

## 2019-06-05 DIAGNOSIS — J45.909 MILD ASTHMA WITHOUT COMPLICATION, UNSPECIFIED WHETHER PERSISTENT: ICD-10-CM

## 2019-06-05 DIAGNOSIS — G47.19 DAYTIME HYPERSOMNOLENCE: ICD-10-CM

## 2019-06-05 DIAGNOSIS — Z87.891 FORMER SMOKER: Primary | ICD-10-CM

## 2019-06-05 PROCEDURE — 99214 OFFICE O/P EST MOD 30 MIN: CPT | Performed by: NURSE PRACTITIONER

## 2019-06-05 PROCEDURE — 94010 BREATHING CAPACITY TEST: CPT | Performed by: NURSE PRACTITIONER

## 2019-06-05 RX ORDER — DESLORATADINE 5 MG/1
TABLET ORAL DAILY
COMMUNITY
End: 2022-04-08

## 2019-06-11 ENCOUNTER — OFFICE VISIT (OUTPATIENT)
Dept: CARDIOLOGY CLINIC | Facility: CLINIC | Age: 69
End: 2019-06-11
Payer: MEDICARE

## 2019-06-11 VITALS
BODY MASS INDEX: 45.64 KG/M2 | DIASTOLIC BLOOD PRESSURE: 60 MMHG | HEART RATE: 79 BPM | WEIGHT: 248 LBS | HEIGHT: 62 IN | SYSTOLIC BLOOD PRESSURE: 118 MMHG | OXYGEN SATURATION: 96 %

## 2019-06-11 DIAGNOSIS — I50.33 ACUTE ON CHRONIC DIASTOLIC CHF (CONGESTIVE HEART FAILURE) (HCC): ICD-10-CM

## 2019-06-11 DIAGNOSIS — I47.1 SVT (SUPRAVENTRICULAR TACHYCARDIA) (HCC): Primary | ICD-10-CM

## 2019-06-11 PROCEDURE — 99215 OFFICE O/P EST HI 40 MIN: CPT | Performed by: INTERNAL MEDICINE

## 2019-06-11 PROCEDURE — 93000 ELECTROCARDIOGRAM COMPLETE: CPT | Performed by: INTERNAL MEDICINE

## 2019-06-11 RX ORDER — TORSEMIDE 20 MG/1
20 TABLET ORAL DAILY
Status: ON HOLD | COMMUNITY
Start: 2019-06-11 | End: 2019-08-01 | Stop reason: SDUPTHER

## 2019-06-11 RX ORDER — DOXYCYCLINE HYCLATE 100 MG
100 TABLET ORAL 2 TIMES DAILY
Refills: 0 | COMMUNITY
Start: 2019-06-07 | End: 2019-06-27 | Stop reason: HOSPADM

## 2019-06-12 ENCOUNTER — PATIENT OUTREACH (OUTPATIENT)
Dept: CASE MANAGEMENT | Facility: OTHER | Age: 69
End: 2019-06-12

## 2019-06-13 ENCOUNTER — HOSPITAL ENCOUNTER (OUTPATIENT)
Dept: SLEEP CENTER | Facility: CLINIC | Age: 69
Discharge: HOME | End: 2019-06-13
Payer: MEDICARE

## 2019-06-13 DIAGNOSIS — G47.19 DAYTIME HYPERSOMNOLENCE: ICD-10-CM

## 2019-06-13 PROCEDURE — 95810 POLYSOM 6/> YRS 4/> PARAM: CPT

## 2019-06-17 ENCOUNTER — TELEPHONE (OUTPATIENT)
Dept: CARDIOLOGY CLINIC | Facility: CLINIC | Age: 69
End: 2019-06-17

## 2019-06-25 ENCOUNTER — HOSPITAL ENCOUNTER (INPATIENT)
Facility: HOSPITAL | Age: 69
LOS: 2 days | Discharge: HOME | DRG: 683 | End: 2019-06-27
Attending: EMERGENCY MEDICINE | Admitting: INTERNAL MEDICINE
Payer: MEDICARE

## 2019-06-25 ENCOUNTER — APPOINTMENT (EMERGENCY)
Dept: RADIOLOGY | Facility: HOSPITAL | Age: 69
DRG: 683 | End: 2019-06-25
Payer: MEDICARE

## 2019-06-25 DIAGNOSIS — R10.9 ABDOMINAL PAIN: ICD-10-CM

## 2019-06-25 DIAGNOSIS — R19.7 DIARRHEA: ICD-10-CM

## 2019-06-25 DIAGNOSIS — E86.0 DEHYDRATION: Primary | ICD-10-CM

## 2019-06-25 PROBLEM — I47.1 SVT (SUPRAVENTRICULAR TACHYCARDIA) (HCC): Status: RESOLVED | Noted: 2018-03-08 | Resolved: 2019-06-25

## 2019-06-25 PROBLEM — B02.9 SHINGLES OUTBREAK: Status: RESOLVED | Noted: 2018-03-08 | Resolved: 2019-06-25

## 2019-06-25 PROBLEM — E78.2 MIXED HYPERLIPIDEMIA: Status: ACTIVE | Noted: 2019-06-25

## 2019-06-25 PROBLEM — I47.10 SVT (SUPRAVENTRICULAR TACHYCARDIA): Status: RESOLVED | Noted: 2018-03-08 | Resolved: 2019-06-25

## 2019-06-25 PROBLEM — A41.9 SEPSIS (HCC): Status: RESOLVED | Noted: 2018-03-10 | Resolved: 2019-06-25

## 2019-06-25 PROBLEM — N17.9 ACUTE KIDNEY INJURY (HCC): Status: ACTIVE | Noted: 2019-06-25

## 2019-06-25 PROBLEM — G47.19 DAYTIME HYPERSOMNOLENCE: Status: RESOLVED | Noted: 2019-06-05 | Resolved: 2019-06-25

## 2019-06-25 LAB
ALBUMIN SERPL BCP-MCNC: 3.4 G/DL (ref 3.5–5)
ALP SERPL-CCNC: 63 U/L (ref 46–116)
ALT SERPL W P-5'-P-CCNC: 32 U/L (ref 12–78)
ANION GAP SERPL CALCULATED.3IONS-SCNC: 10 MMOL/L (ref 4–13)
AST SERPL W P-5'-P-CCNC: 22 U/L (ref 5–45)
BASOPHILS # BLD AUTO: 0.06 THOUSANDS/ΜL (ref 0–0.1)
BASOPHILS NFR BLD AUTO: 1 % (ref 0–1)
BILIRUB SERPL-MCNC: 0.3 MG/DL (ref 0.2–1)
BILIRUB UR QL STRIP: NEGATIVE
BUN SERPL-MCNC: 72 MG/DL (ref 5–25)
CALCIUM SERPL-MCNC: 8.2 MG/DL (ref 8.3–10.1)
CHLORIDE SERPL-SCNC: 103 MMOL/L (ref 100–108)
CLARITY UR: CLEAR
CO2 SERPL-SCNC: 26 MMOL/L (ref 21–32)
COLOR UR: YELLOW
CREAT SERPL-MCNC: 2.14 MG/DL (ref 0.6–1.3)
EOSINOPHIL # BLD AUTO: 0.22 THOUSAND/ΜL (ref 0–0.61)
EOSINOPHIL NFR BLD AUTO: 2 % (ref 0–6)
ERYTHROCYTE [DISTWIDTH] IN BLOOD BY AUTOMATED COUNT: 13.4 % (ref 11.6–15.1)
GFR SERPL CREATININE-BSD FRML MDRD: 23 ML/MIN/1.73SQ M
GLUCOSE SERPL-MCNC: 105 MG/DL (ref 65–140)
GLUCOSE SERPL-MCNC: 141 MG/DL (ref 65–140)
GLUCOSE UR STRIP-MCNC: NEGATIVE MG/DL
HCT VFR BLD AUTO: 37.8 % (ref 34.8–46.1)
HGB BLD-MCNC: 12.3 G/DL (ref 11.5–15.4)
HGB UR QL STRIP.AUTO: NEGATIVE
IMM GRANULOCYTES # BLD AUTO: 0.03 THOUSAND/UL (ref 0–0.2)
IMM GRANULOCYTES NFR BLD AUTO: 0 % (ref 0–2)
KETONES UR STRIP-MCNC: NEGATIVE MG/DL
LEUKOCYTE ESTERASE UR QL STRIP: NEGATIVE
LIPASE SERPL-CCNC: 785 U/L (ref 73–393)
LYMPHOCYTES # BLD AUTO: 2.97 THOUSANDS/ΜL (ref 0.6–4.47)
LYMPHOCYTES NFR BLD AUTO: 32 % (ref 14–44)
MAGNESIUM SERPL-MCNC: 1.8 MG/DL (ref 1.6–2.6)
MCH RBC QN AUTO: 29.9 PG (ref 26.8–34.3)
MCHC RBC AUTO-ENTMCNC: 32.5 G/DL (ref 31.4–37.4)
MCV RBC AUTO: 92 FL (ref 82–98)
MONOCYTES # BLD AUTO: 0.72 THOUSAND/ΜL (ref 0.17–1.22)
MONOCYTES NFR BLD AUTO: 8 % (ref 4–12)
NEUTROPHILS # BLD AUTO: 5.24 THOUSANDS/ΜL (ref 1.85–7.62)
NEUTS SEG NFR BLD AUTO: 57 % (ref 43–75)
NITRITE UR QL STRIP: NEGATIVE
NRBC BLD AUTO-RTO: 0 /100 WBCS
NT-PROBNP SERPL-MCNC: 140 PG/ML
PH UR STRIP.AUTO: 5.5 [PH]
PHOSPHATE SERPL-MCNC: 4.4 MG/DL (ref 2.3–4.1)
PLATELET # BLD AUTO: 157 THOUSANDS/UL (ref 149–390)
PMV BLD AUTO: 11.2 FL (ref 8.9–12.7)
POTASSIUM SERPL-SCNC: 5.2 MMOL/L (ref 3.5–5.3)
PROT SERPL-MCNC: 7.1 G/DL (ref 6.4–8.2)
PROT UR STRIP-MCNC: NEGATIVE MG/DL
RBC # BLD AUTO: 4.11 MILLION/UL (ref 3.81–5.12)
SODIUM SERPL-SCNC: 139 MMOL/L (ref 136–145)
SP GR UR STRIP.AUTO: 1.02 (ref 1–1.03)
TROPONIN I SERPL-MCNC: <0.02 NG/ML
UROBILINOGEN UR QL STRIP.AUTO: 0.2 E.U./DL
WBC # BLD AUTO: 9.24 THOUSAND/UL (ref 4.31–10.16)

## 2019-06-25 PROCEDURE — 82948 REAGENT STRIP/BLOOD GLUCOSE: CPT

## 2019-06-25 PROCEDURE — 87081 CULTURE SCREEN ONLY: CPT | Performed by: NURSE PRACTITIONER

## 2019-06-25 PROCEDURE — 83735 ASSAY OF MAGNESIUM: CPT | Performed by: EMERGENCY MEDICINE

## 2019-06-25 PROCEDURE — 84100 ASSAY OF PHOSPHORUS: CPT | Performed by: EMERGENCY MEDICINE

## 2019-06-25 PROCEDURE — 36415 COLL VENOUS BLD VENIPUNCTURE: CPT | Performed by: EMERGENCY MEDICINE

## 2019-06-25 PROCEDURE — 99222 1ST HOSP IP/OBS MODERATE 55: CPT | Performed by: NURSE PRACTITIONER

## 2019-06-25 PROCEDURE — 71045 X-RAY EXAM CHEST 1 VIEW: CPT

## 2019-06-25 PROCEDURE — 96361 HYDRATE IV INFUSION ADD-ON: CPT

## 2019-06-25 PROCEDURE — 74176 CT ABD & PELVIS W/O CONTRAST: CPT

## 2019-06-25 PROCEDURE — 83880 ASSAY OF NATRIURETIC PEPTIDE: CPT | Performed by: EMERGENCY MEDICINE

## 2019-06-25 PROCEDURE — 96374 THER/PROPH/DIAG INJ IV PUSH: CPT

## 2019-06-25 PROCEDURE — 99285 EMERGENCY DEPT VISIT HI MDM: CPT

## 2019-06-25 PROCEDURE — 93005 ELECTROCARDIOGRAM TRACING: CPT

## 2019-06-25 PROCEDURE — 81003 URINALYSIS AUTO W/O SCOPE: CPT | Performed by: EMERGENCY MEDICINE

## 2019-06-25 PROCEDURE — 84484 ASSAY OF TROPONIN QUANT: CPT | Performed by: EMERGENCY MEDICINE

## 2019-06-25 PROCEDURE — 80053 COMPREHEN METABOLIC PANEL: CPT | Performed by: EMERGENCY MEDICINE

## 2019-06-25 PROCEDURE — 85025 COMPLETE CBC W/AUTO DIFF WBC: CPT | Performed by: EMERGENCY MEDICINE

## 2019-06-25 PROCEDURE — 83690 ASSAY OF LIPASE: CPT | Performed by: EMERGENCY MEDICINE

## 2019-06-25 RX ORDER — METOPROLOL TARTRATE 100 MG/1
100 TABLET ORAL EVERY 12 HOURS SCHEDULED
Status: DISCONTINUED | OUTPATIENT
Start: 2019-06-25 | End: 2019-06-27 | Stop reason: HOSPADM

## 2019-06-25 RX ORDER — ONDANSETRON 2 MG/ML
4 INJECTION INTRAMUSCULAR; INTRAVENOUS ONCE
Status: COMPLETED | OUTPATIENT
Start: 2019-06-25 | End: 2019-06-25

## 2019-06-25 RX ORDER — SODIUM CHLORIDE 9 MG/ML
75 INJECTION, SOLUTION INTRAVENOUS CONTINUOUS
Status: DISCONTINUED | OUTPATIENT
Start: 2019-06-25 | End: 2019-06-27 | Stop reason: HOSPADM

## 2019-06-25 RX ORDER — ONDANSETRON 2 MG/ML
4 INJECTION INTRAMUSCULAR; INTRAVENOUS EVERY 6 HOURS PRN
Status: DISCONTINUED | OUTPATIENT
Start: 2019-06-25 | End: 2019-06-27 | Stop reason: HOSPADM

## 2019-06-25 RX ORDER — ONDANSETRON 2 MG/ML
4 INJECTION INTRAMUSCULAR; INTRAVENOUS ONCE
Status: DISCONTINUED | OUTPATIENT
Start: 2019-06-25 | End: 2019-06-25

## 2019-06-25 RX ORDER — PREGABALIN 100 MG/1
100 CAPSULE ORAL 2 TIMES DAILY
Status: DISCONTINUED | OUTPATIENT
Start: 2019-06-25 | End: 2019-06-27 | Stop reason: HOSPADM

## 2019-06-25 RX ORDER — MONTELUKAST SODIUM 10 MG/1
10 TABLET ORAL
Status: DISCONTINUED | OUTPATIENT
Start: 2019-06-26 | End: 2019-06-27 | Stop reason: HOSPADM

## 2019-06-25 RX ORDER — HYDROMORPHONE HCL/PF 1 MG/ML
0.5 SYRINGE (ML) INJECTION ONCE
Status: DISCONTINUED | OUTPATIENT
Start: 2019-06-25 | End: 2019-06-25

## 2019-06-25 RX ORDER — ATORVASTATIN CALCIUM 40 MG/1
40 TABLET, FILM COATED ORAL
Status: DISCONTINUED | OUTPATIENT
Start: 2019-06-26 | End: 2019-06-27 | Stop reason: HOSPADM

## 2019-06-25 RX ORDER — LORATADINE 10 MG/1
10 TABLET ORAL DAILY
Status: DISCONTINUED | OUTPATIENT
Start: 2019-06-26 | End: 2019-06-27 | Stop reason: HOSPADM

## 2019-06-25 RX ORDER — INSULIN GLARGINE 100 [IU]/ML
30 INJECTION, SOLUTION SUBCUTANEOUS EVERY 12 HOURS SCHEDULED
Status: DISCONTINUED | OUTPATIENT
Start: 2019-06-25 | End: 2019-06-27 | Stop reason: HOSPADM

## 2019-06-25 RX ADMIN — METOPROLOL TARTRATE 100 MG: 100 TABLET, FILM COATED ORAL at 21:18

## 2019-06-25 RX ADMIN — ONDANSETRON 4 MG: 2 INJECTION INTRAMUSCULAR; INTRAVENOUS at 17:30

## 2019-06-25 RX ADMIN — SODIUM CHLORIDE 1000 ML: 0.9 INJECTION, SOLUTION INTRAVENOUS at 17:02

## 2019-06-25 RX ADMIN — PREGABALIN 100 MG: 100 CAPSULE ORAL at 21:18

## 2019-06-25 RX ADMIN — APIXABAN 5 MG: 5 TABLET, FILM COATED ORAL at 21:18

## 2019-06-25 RX ADMIN — INSULIN GLARGINE 30 UNITS: 100 INJECTION, SOLUTION SUBCUTANEOUS at 21:25

## 2019-06-25 RX ADMIN — SODIUM CHLORIDE 75 ML/HR: 0.9 INJECTION, SOLUTION INTRAVENOUS at 21:19

## 2019-06-25 RX ADMIN — IOHEXOL 900 ML: 240 INJECTION, SOLUTION INTRATHECAL; INTRAVASCULAR; INTRAVENOUS; ORAL at 17:40

## 2019-06-26 PROBLEM — K52.9 GASTROENTERITIS: Status: ACTIVE | Noted: 2019-06-25

## 2019-06-26 PROBLEM — E11.9 DIABETES (HCC): Status: ACTIVE | Noted: 2019-06-26

## 2019-06-26 LAB
ALBUMIN SERPL BCP-MCNC: 2.8 G/DL (ref 3.5–5)
ALP SERPL-CCNC: 48 U/L (ref 46–116)
ALT SERPL W P-5'-P-CCNC: 27 U/L (ref 12–78)
ANION GAP SERPL CALCULATED.3IONS-SCNC: 10 MMOL/L (ref 4–13)
AST SERPL W P-5'-P-CCNC: 15 U/L (ref 5–45)
BILIRUB SERPL-MCNC: 0.3 MG/DL (ref 0.2–1)
BUN SERPL-MCNC: 65 MG/DL (ref 5–25)
CALCIUM SERPL-MCNC: 7.8 MG/DL (ref 8.3–10.1)
CHLORIDE SERPL-SCNC: 107 MMOL/L (ref 100–108)
CO2 SERPL-SCNC: 23 MMOL/L (ref 21–32)
CREAT SERPL-MCNC: 1.81 MG/DL (ref 0.6–1.3)
ERYTHROCYTE [DISTWIDTH] IN BLOOD BY AUTOMATED COUNT: 13.5 % (ref 11.6–15.1)
GFR SERPL CREATININE-BSD FRML MDRD: 29 ML/MIN/1.73SQ M
GLUCOSE SERPL-MCNC: 148 MG/DL (ref 65–140)
GLUCOSE SERPL-MCNC: 150 MG/DL (ref 65–140)
GLUCOSE SERPL-MCNC: 203 MG/DL (ref 65–140)
GLUCOSE SERPL-MCNC: 245 MG/DL (ref 65–140)
GLUCOSE SERPL-MCNC: 246 MG/DL (ref 65–140)
HCT VFR BLD AUTO: 33.6 % (ref 34.8–46.1)
HGB BLD-MCNC: 10.7 G/DL (ref 11.5–15.4)
MCH RBC QN AUTO: 29.5 PG (ref 26.8–34.3)
MCHC RBC AUTO-ENTMCNC: 31.8 G/DL (ref 31.4–37.4)
MCV RBC AUTO: 93 FL (ref 82–98)
PLATELET # BLD AUTO: 130 THOUSANDS/UL (ref 149–390)
PMV BLD AUTO: 11.3 FL (ref 8.9–12.7)
POTASSIUM SERPL-SCNC: 4.6 MMOL/L (ref 3.5–5.3)
PROT SERPL-MCNC: 6 G/DL (ref 6.4–8.2)
RBC # BLD AUTO: 3.63 MILLION/UL (ref 3.81–5.12)
SODIUM SERPL-SCNC: 140 MMOL/L (ref 136–145)
WBC # BLD AUTO: 7.6 THOUSAND/UL (ref 4.31–10.16)

## 2019-06-26 PROCEDURE — 80053 COMPREHEN METABOLIC PANEL: CPT | Performed by: NURSE PRACTITIONER

## 2019-06-26 PROCEDURE — 97166 OT EVAL MOD COMPLEX 45 MIN: CPT

## 2019-06-26 PROCEDURE — 97110 THERAPEUTIC EXERCISES: CPT

## 2019-06-26 PROCEDURE — 82948 REAGENT STRIP/BLOOD GLUCOSE: CPT

## 2019-06-26 PROCEDURE — G8987 SELF CARE CURRENT STATUS: HCPCS

## 2019-06-26 PROCEDURE — G8980 MOBILITY D/C STATUS: HCPCS

## 2019-06-26 PROCEDURE — G8979 MOBILITY GOAL STATUS: HCPCS

## 2019-06-26 PROCEDURE — 85027 COMPLETE CBC AUTOMATED: CPT | Performed by: NURSE PRACTITIONER

## 2019-06-26 PROCEDURE — G8978 MOBILITY CURRENT STATUS: HCPCS

## 2019-06-26 PROCEDURE — 97161 PT EVAL LOW COMPLEX 20 MIN: CPT

## 2019-06-26 PROCEDURE — 99232 SBSQ HOSP IP/OBS MODERATE 35: CPT | Performed by: STUDENT IN AN ORGANIZED HEALTH CARE EDUCATION/TRAINING PROGRAM

## 2019-06-26 PROCEDURE — G8988 SELF CARE GOAL STATUS: HCPCS

## 2019-06-26 RX ADMIN — INSULIN LISPRO 2 UNITS: 100 INJECTION, SOLUTION INTRAVENOUS; SUBCUTANEOUS at 16:53

## 2019-06-26 RX ADMIN — INSULIN LISPRO 3 UNITS: 100 INJECTION, SOLUTION INTRAVENOUS; SUBCUTANEOUS at 21:40

## 2019-06-26 RX ADMIN — PREGABALIN 100 MG: 100 CAPSULE ORAL at 09:08

## 2019-06-26 RX ADMIN — APIXABAN 5 MG: 5 TABLET, FILM COATED ORAL at 09:08

## 2019-06-26 RX ADMIN — ATORVASTATIN CALCIUM 40 MG: 40 TABLET, FILM COATED ORAL at 16:54

## 2019-06-26 RX ADMIN — INSULIN GLARGINE 30 UNITS: 100 INJECTION, SOLUTION SUBCUTANEOUS at 21:37

## 2019-06-26 RX ADMIN — INSULIN LISPRO 3 UNITS: 100 INJECTION, SOLUTION INTRAVENOUS; SUBCUTANEOUS at 12:02

## 2019-06-26 RX ADMIN — APIXABAN 5 MG: 5 TABLET, FILM COATED ORAL at 17:00

## 2019-06-26 RX ADMIN — LORATADINE 10 MG: 10 TABLET ORAL at 09:08

## 2019-06-26 RX ADMIN — PREGABALIN 100 MG: 100 CAPSULE ORAL at 17:00

## 2019-06-26 RX ADMIN — METOPROLOL TARTRATE 100 MG: 100 TABLET, FILM COATED ORAL at 21:37

## 2019-06-26 RX ADMIN — INSULIN GLARGINE 30 UNITS: 100 INJECTION, SOLUTION SUBCUTANEOUS at 09:07

## 2019-06-26 RX ADMIN — METOPROLOL TARTRATE 100 MG: 100 TABLET, FILM COATED ORAL at 09:08

## 2019-06-26 RX ADMIN — MONTELUKAST SODIUM 10 MG: 10 TABLET, FILM COATED ORAL at 21:37

## 2019-06-27 VITALS
DIASTOLIC BLOOD PRESSURE: 66 MMHG | HEIGHT: 62 IN | SYSTOLIC BLOOD PRESSURE: 123 MMHG | BODY MASS INDEX: 46.56 KG/M2 | WEIGHT: 253 LBS | HEART RATE: 58 BPM | TEMPERATURE: 97.3 F | OXYGEN SATURATION: 100 % | RESPIRATION RATE: 18 BRPM

## 2019-06-27 LAB
ANION GAP SERPL CALCULATED.3IONS-SCNC: 8 MMOL/L (ref 4–13)
BASOPHILS # BLD AUTO: 0.06 THOUSANDS/ΜL (ref 0–0.1)
BASOPHILS NFR BLD AUTO: 1 % (ref 0–1)
BUN SERPL-MCNC: 43 MG/DL (ref 5–25)
CALCIUM SERPL-MCNC: 7.6 MG/DL (ref 8.3–10.1)
CAMPYLOBACTER DNA SPEC NAA+PROBE: NORMAL
CHLORIDE SERPL-SCNC: 110 MMOL/L (ref 100–108)
CO2 SERPL-SCNC: 24 MMOL/L (ref 21–32)
CREAT SERPL-MCNC: 1.32 MG/DL (ref 0.6–1.3)
EOSINOPHIL # BLD AUTO: 0.42 THOUSAND/ΜL (ref 0–0.61)
EOSINOPHIL NFR BLD AUTO: 5 % (ref 0–6)
ERYTHROCYTE [DISTWIDTH] IN BLOOD BY AUTOMATED COUNT: 13.5 % (ref 11.6–15.1)
GFR SERPL CREATININE-BSD FRML MDRD: 42 ML/MIN/1.73SQ M
GLUCOSE SERPL-MCNC: 100 MG/DL (ref 65–140)
GLUCOSE SERPL-MCNC: 100 MG/DL (ref 65–140)
HCT VFR BLD AUTO: 34.7 % (ref 34.8–46.1)
HGB BLD-MCNC: 10.9 G/DL (ref 11.5–15.4)
IMM GRANULOCYTES # BLD AUTO: 0.04 THOUSAND/UL (ref 0–0.2)
IMM GRANULOCYTES NFR BLD AUTO: 1 % (ref 0–2)
LYMPHOCYTES # BLD AUTO: 2.81 THOUSANDS/ΜL (ref 0.6–4.47)
LYMPHOCYTES NFR BLD AUTO: 36 % (ref 14–44)
MAGNESIUM SERPL-MCNC: 2 MG/DL (ref 1.6–2.6)
MCH RBC QN AUTO: 29.5 PG (ref 26.8–34.3)
MCHC RBC AUTO-ENTMCNC: 31.4 G/DL (ref 31.4–37.4)
MCV RBC AUTO: 94 FL (ref 82–98)
MONOCYTES # BLD AUTO: 0.73 THOUSAND/ΜL (ref 0.17–1.22)
MONOCYTES NFR BLD AUTO: 9 % (ref 4–12)
MRSA NOSE QL CULT: NORMAL
NEUTROPHILS # BLD AUTO: 3.67 THOUSANDS/ΜL (ref 1.85–7.62)
NEUTS SEG NFR BLD AUTO: 48 % (ref 43–75)
NRBC BLD AUTO-RTO: 0 /100 WBCS
PLATELET # BLD AUTO: 121 THOUSANDS/UL (ref 149–390)
PMV BLD AUTO: 11 FL (ref 8.9–12.7)
POTASSIUM SERPL-SCNC: 4.9 MMOL/L (ref 3.5–5.3)
RBC # BLD AUTO: 3.7 MILLION/UL (ref 3.81–5.12)
SALMONELLA DNA SPEC QL NAA+PROBE: NORMAL
SHIGA TOXIN STX GENE SPEC NAA+PROBE: NORMAL
SHIGELLA DNA SPEC QL NAA+PROBE: NORMAL
SODIUM SERPL-SCNC: 142 MMOL/L (ref 136–145)
WBC # BLD AUTO: 7.73 THOUSAND/UL (ref 4.31–10.16)

## 2019-06-27 PROCEDURE — 99239 HOSP IP/OBS DSCHRG MGMT >30: CPT | Performed by: STUDENT IN AN ORGANIZED HEALTH CARE EDUCATION/TRAINING PROGRAM

## 2019-06-27 PROCEDURE — 83735 ASSAY OF MAGNESIUM: CPT | Performed by: STUDENT IN AN ORGANIZED HEALTH CARE EDUCATION/TRAINING PROGRAM

## 2019-06-27 PROCEDURE — 80048 BASIC METABOLIC PNL TOTAL CA: CPT | Performed by: STUDENT IN AN ORGANIZED HEALTH CARE EDUCATION/TRAINING PROGRAM

## 2019-06-27 PROCEDURE — 87505 NFCT AGENT DETECTION GI: CPT | Performed by: NURSE PRACTITIONER

## 2019-06-27 PROCEDURE — 85025 COMPLETE CBC W/AUTO DIFF WBC: CPT | Performed by: STUDENT IN AN ORGANIZED HEALTH CARE EDUCATION/TRAINING PROGRAM

## 2019-06-27 PROCEDURE — 82948 REAGENT STRIP/BLOOD GLUCOSE: CPT

## 2019-06-27 RX ADMIN — PREGABALIN 100 MG: 100 CAPSULE ORAL at 08:01

## 2019-06-27 RX ADMIN — LORATADINE 10 MG: 10 TABLET ORAL at 08:01

## 2019-06-27 RX ADMIN — INSULIN GLARGINE 30 UNITS: 100 INJECTION, SOLUTION SUBCUTANEOUS at 08:02

## 2019-06-27 RX ADMIN — METOPROLOL TARTRATE 100 MG: 100 TABLET, FILM COATED ORAL at 08:01

## 2019-06-27 RX ADMIN — SODIUM CHLORIDE 75 ML/HR: 0.9 INJECTION, SOLUTION INTRAVENOUS at 00:17

## 2019-06-27 RX ADMIN — APIXABAN 5 MG: 5 TABLET, FILM COATED ORAL at 08:00

## 2019-06-28 ENCOUNTER — PATIENT OUTREACH (OUTPATIENT)
Dept: CASE MANAGEMENT | Facility: OTHER | Age: 69
End: 2019-06-28

## 2019-06-28 LAB
ATRIAL RATE: 71 BPM
P AXIS: 23 DEGREES
PR INTERVAL: 164 MS
QRS AXIS: -28 DEGREES
QRSD INTERVAL: 70 MS
QT INTERVAL: 392 MS
QTC INTERVAL: 425 MS
T WAVE AXIS: 17 DEGREES
VENTRICULAR RATE: 71 BPM

## 2019-06-28 PROCEDURE — 93010 ELECTROCARDIOGRAM REPORT: CPT | Performed by: INTERNAL MEDICINE

## 2019-07-25 ENCOUNTER — PATIENT OUTREACH (OUTPATIENT)
Dept: CASE MANAGEMENT | Facility: OTHER | Age: 69
End: 2019-07-25

## 2019-07-29 ENCOUNTER — APPOINTMENT (EMERGENCY)
Dept: RADIOLOGY | Facility: HOSPITAL | Age: 69
DRG: 683 | End: 2019-07-29
Payer: MEDICARE

## 2019-07-29 ENCOUNTER — HOSPITAL ENCOUNTER (INPATIENT)
Facility: HOSPITAL | Age: 69
LOS: 3 days | Discharge: HOME | DRG: 683 | End: 2019-08-01
Attending: EMERGENCY MEDICINE | Admitting: STUDENT IN AN ORGANIZED HEALTH CARE EDUCATION/TRAINING PROGRAM
Payer: MEDICARE

## 2019-07-29 DIAGNOSIS — R55 SYNCOPE: Primary | ICD-10-CM

## 2019-07-29 DIAGNOSIS — N17.9 ACUTE RENAL FAILURE SUPERIMPOSED ON STAGE 3 CHRONIC KIDNEY DISEASE, UNSPECIFIED ACUTE RENAL FAILURE TYPE: ICD-10-CM

## 2019-07-29 DIAGNOSIS — N18.3 ACUTE RENAL FAILURE SUPERIMPOSED ON STAGE 3 CHRONIC KIDNEY DISEASE, UNSPECIFIED ACUTE RENAL FAILURE TYPE: ICD-10-CM

## 2019-07-29 DIAGNOSIS — IMO0001 UNCONTROLLED DIABETES MELLITUS TYPE 2 WITHOUT COMPLICATIONS: ICD-10-CM

## 2019-07-29 DIAGNOSIS — N17.9 AKI (ACUTE KIDNEY INJURY) (HCC): ICD-10-CM

## 2019-07-29 DIAGNOSIS — I47.1 SVT (SUPRAVENTRICULAR TACHYCARDIA) (HCC): ICD-10-CM

## 2019-07-29 DIAGNOSIS — I10 ESSENTIAL HYPERTENSION: ICD-10-CM

## 2019-07-29 DIAGNOSIS — K21.9 GERD (GASTROESOPHAGEAL REFLUX DISEASE): ICD-10-CM

## 2019-07-29 DIAGNOSIS — D64.9 ANEMIA, UNSPECIFIED TYPE: ICD-10-CM

## 2019-07-29 PROBLEM — K52.9 GASTROENTERITIS: Status: RESOLVED | Noted: 2019-06-25 | Resolved: 2019-07-29

## 2019-07-29 PROBLEM — N18.30 ACUTE RENAL FAILURE SUPERIMPOSED ON STAGE 3 CHRONIC KIDNEY DISEASE (HCC): Status: ACTIVE | Noted: 2019-06-25

## 2019-07-29 LAB
ALBUMIN SERPL BCP-MCNC: 3.5 G/DL (ref 3.5–5)
ALP SERPL-CCNC: 47 U/L (ref 46–116)
ALT SERPL W P-5'-P-CCNC: 27 U/L (ref 12–78)
ANION GAP SERPL CALCULATED.3IONS-SCNC: 9 MMOL/L (ref 4–13)
AST SERPL W P-5'-P-CCNC: 12 U/L (ref 5–45)
BASOPHILS # BLD AUTO: 0.07 THOUSANDS/ΜL (ref 0–0.1)
BASOPHILS NFR BLD AUTO: 1 % (ref 0–1)
BILIRUB SERPL-MCNC: 0.4 MG/DL (ref 0.2–1)
BILIRUB UR QL STRIP: NEGATIVE
BUN SERPL-MCNC: 65 MG/DL (ref 5–25)
CALCIUM SERPL-MCNC: 7.8 MG/DL (ref 8.3–10.1)
CHLORIDE SERPL-SCNC: 102 MMOL/L (ref 100–108)
CLARITY UR: NORMAL
CO2 SERPL-SCNC: 25 MMOL/L (ref 21–32)
COLOR UR: NORMAL
CREAT SERPL-MCNC: 3.53 MG/DL (ref 0.6–1.3)
EOSINOPHIL # BLD AUTO: 0.32 THOUSAND/ΜL (ref 0–0.61)
EOSINOPHIL NFR BLD AUTO: 4 % (ref 0–6)
ERYTHROCYTE [DISTWIDTH] IN BLOOD BY AUTOMATED COUNT: 13.5 % (ref 11.6–15.1)
GFR SERPL CREATININE-BSD FRML MDRD: 13 ML/MIN/1.73SQ M
GLUCOSE SERPL-MCNC: 135 MG/DL (ref 65–140)
GLUCOSE SERPL-MCNC: 145 MG/DL (ref 65–140)
GLUCOSE SERPL-MCNC: 148 MG/DL (ref 65–140)
GLUCOSE UR STRIP-MCNC: NEGATIVE MG/DL
HCT VFR BLD AUTO: 34.7 % (ref 34.8–46.1)
HGB BLD-MCNC: 10.9 G/DL (ref 11.5–15.4)
HGB UR QL STRIP.AUTO: NEGATIVE
IMM GRANULOCYTES # BLD AUTO: 0.02 THOUSAND/UL (ref 0–0.2)
IMM GRANULOCYTES NFR BLD AUTO: 0 % (ref 0–2)
KETONES UR STRIP-MCNC: NEGATIVE MG/DL
LEUKOCYTE ESTERASE UR QL STRIP: NEGATIVE
LYMPHOCYTES # BLD AUTO: 2.78 THOUSANDS/ΜL (ref 0.6–4.47)
LYMPHOCYTES NFR BLD AUTO: 32 % (ref 14–44)
MCH RBC QN AUTO: 29 PG (ref 26.8–34.3)
MCHC RBC AUTO-ENTMCNC: 31.4 G/DL (ref 31.4–37.4)
MCV RBC AUTO: 92 FL (ref 82–98)
MONOCYTES # BLD AUTO: 0.76 THOUSAND/ΜL (ref 0.17–1.22)
MONOCYTES NFR BLD AUTO: 9 % (ref 4–12)
NEUTROPHILS # BLD AUTO: 4.8 THOUSANDS/ΜL (ref 1.85–7.62)
NEUTS SEG NFR BLD AUTO: 54 % (ref 43–75)
NITRITE UR QL STRIP: NEGATIVE
NRBC BLD AUTO-RTO: 0 /100 WBCS
PH UR STRIP.AUTO: 5 [PH]
PLATELET # BLD AUTO: 171 THOUSANDS/UL (ref 149–390)
PMV BLD AUTO: 10.4 FL (ref 8.9–12.7)
POTASSIUM SERPL-SCNC: 5.1 MMOL/L (ref 3.5–5.3)
PROT SERPL-MCNC: 6.7 G/DL (ref 6.4–8.2)
PROT UR STRIP-MCNC: NEGATIVE MG/DL
RBC # BLD AUTO: 3.76 MILLION/UL (ref 3.81–5.12)
SODIUM SERPL-SCNC: 136 MMOL/L (ref 136–145)
SP GR UR STRIP.AUTO: 1.02 (ref 1–1.03)
TROPONIN I SERPL-MCNC: <0.02 NG/ML
UROBILINOGEN UR QL STRIP.AUTO: 0.2 E.U./DL
WBC # BLD AUTO: 8.75 THOUSAND/UL (ref 4.31–10.16)

## 2019-07-29 PROCEDURE — 73030 X-RAY EXAM OF SHOULDER: CPT

## 2019-07-29 PROCEDURE — 96360 HYDRATION IV INFUSION INIT: CPT

## 2019-07-29 PROCEDURE — 85025 COMPLETE CBC W/AUTO DIFF WBC: CPT | Performed by: EMERGENCY MEDICINE

## 2019-07-29 PROCEDURE — 82948 REAGENT STRIP/BLOOD GLUCOSE: CPT

## 2019-07-29 PROCEDURE — 99223 1ST HOSP IP/OBS HIGH 75: CPT | Performed by: STUDENT IN AN ORGANIZED HEALTH CARE EDUCATION/TRAINING PROGRAM

## 2019-07-29 PROCEDURE — 70450 CT HEAD/BRAIN W/O DYE: CPT

## 2019-07-29 PROCEDURE — 93005 ELECTROCARDIOGRAM TRACING: CPT

## 2019-07-29 PROCEDURE — 81003 URINALYSIS AUTO W/O SCOPE: CPT | Performed by: EMERGENCY MEDICINE

## 2019-07-29 PROCEDURE — 80053 COMPREHEN METABOLIC PANEL: CPT | Performed by: EMERGENCY MEDICINE

## 2019-07-29 PROCEDURE — 73564 X-RAY EXAM KNEE 4 OR MORE: CPT

## 2019-07-29 PROCEDURE — 99285 EMERGENCY DEPT VISIT HI MDM: CPT

## 2019-07-29 PROCEDURE — 84484 ASSAY OF TROPONIN QUANT: CPT | Performed by: EMERGENCY MEDICINE

## 2019-07-29 PROCEDURE — 87081 CULTURE SCREEN ONLY: CPT | Performed by: STUDENT IN AN ORGANIZED HEALTH CARE EDUCATION/TRAINING PROGRAM

## 2019-07-29 PROCEDURE — 36415 COLL VENOUS BLD VENIPUNCTURE: CPT | Performed by: EMERGENCY MEDICINE

## 2019-07-29 RX ORDER — LORATADINE 10 MG/1
10 TABLET ORAL DAILY
Status: DISCONTINUED | OUTPATIENT
Start: 2019-07-30 | End: 2019-08-01 | Stop reason: HOSPADM

## 2019-07-29 RX ORDER — FAMOTIDINE 20 MG/1
20 TABLET, FILM COATED ORAL DAILY
Status: DISCONTINUED | OUTPATIENT
Start: 2019-07-30 | End: 2019-08-01 | Stop reason: HOSPADM

## 2019-07-29 RX ORDER — MONTELUKAST SODIUM 10 MG/1
10 TABLET ORAL
Status: DISCONTINUED | OUTPATIENT
Start: 2019-07-30 | End: 2019-08-01 | Stop reason: HOSPADM

## 2019-07-29 RX ORDER — PREGABALIN 100 MG/1
100 CAPSULE ORAL 2 TIMES DAILY
Status: DISCONTINUED | OUTPATIENT
Start: 2019-07-29 | End: 2019-08-01 | Stop reason: HOSPADM

## 2019-07-29 RX ORDER — METOPROLOL TARTRATE 100 MG/1
100 TABLET ORAL EVERY 12 HOURS SCHEDULED
Status: DISCONTINUED | OUTPATIENT
Start: 2019-07-29 | End: 2019-07-30

## 2019-07-29 RX ORDER — ALBUTEROL SULFATE 90 UG/1
2 AEROSOL, METERED RESPIRATORY (INHALATION) EVERY 6 HOURS PRN
Status: DISCONTINUED | OUTPATIENT
Start: 2019-07-29 | End: 2019-08-01 | Stop reason: HOSPADM

## 2019-07-29 RX ORDER — ONDANSETRON 2 MG/ML
4 INJECTION INTRAMUSCULAR; INTRAVENOUS EVERY 6 HOURS PRN
Status: DISCONTINUED | OUTPATIENT
Start: 2019-07-29 | End: 2019-08-01 | Stop reason: HOSPADM

## 2019-07-29 RX ORDER — INSULIN GLARGINE 100 [IU]/ML
30 INJECTION, SOLUTION SUBCUTANEOUS EVERY 12 HOURS SCHEDULED
Status: DISCONTINUED | OUTPATIENT
Start: 2019-07-29 | End: 2019-08-01 | Stop reason: HOSPADM

## 2019-07-29 RX ORDER — ACETAMINOPHEN 325 MG/1
650 TABLET ORAL EVERY 6 HOURS PRN
Status: DISCONTINUED | OUTPATIENT
Start: 2019-07-29 | End: 2019-08-01 | Stop reason: HOSPADM

## 2019-07-29 RX ORDER — SODIUM CHLORIDE 9 MG/ML
125 INJECTION, SOLUTION INTRAVENOUS CONTINUOUS
Status: DISCONTINUED | OUTPATIENT
Start: 2019-07-29 | End: 2019-07-30

## 2019-07-29 RX ORDER — HEPARIN SODIUM 5000 [USP'U]/ML
5000 INJECTION, SOLUTION INTRAVENOUS; SUBCUTANEOUS EVERY 8 HOURS SCHEDULED
Status: DISCONTINUED | OUTPATIENT
Start: 2019-07-29 | End: 2019-07-29 | Stop reason: SDUPTHER

## 2019-07-29 RX ORDER — SACCHAROMYCES BOULARDII 250 MG
250 CAPSULE ORAL 2 TIMES DAILY
Status: DISCONTINUED | OUTPATIENT
Start: 2019-07-29 | End: 2019-08-01 | Stop reason: HOSPADM

## 2019-07-29 RX ORDER — MONTELUKAST SODIUM 10 MG/1
10 TABLET ORAL
Status: DISCONTINUED | OUTPATIENT
Start: 2019-07-29 | End: 2019-07-29

## 2019-07-29 RX ADMIN — PREGABALIN 100 MG: 100 CAPSULE ORAL at 20:42

## 2019-07-29 RX ADMIN — SODIUM CHLORIDE 1000 ML: 0.9 INJECTION, SOLUTION INTRAVENOUS at 15:23

## 2019-07-29 RX ADMIN — SODIUM CHLORIDE 125 ML/HR: 0.9 INJECTION, SOLUTION INTRAVENOUS at 20:44

## 2019-07-29 NOTE — ASSESSMENT & PLAN NOTE
Baseline creatinine 1 3  On admission creatinine 3 5, likely prerenal, due to dehydration, decreased PO intake and diuretic use  Patient was doing a bowel prep  UA bland    · Admit to inpatient  · IVF challenge  · Monitor UOP  · Hold nephrotoxic meds  · Serial labs to follow

## 2019-07-29 NOTE — ASSESSMENT & PLAN NOTE
Patient on liquid diet, doing colonoscopy prep and taking diuretics, with dizziness, lightheadedness and syncope  Left shoulder and knee pain  Has hx of PAF and SVT  Denied CP, palpitations     EKG showed NSR, braycardia  CT head with no acute intracranial findings  Orthostatics negative in ED  Most likely hypovolemia/dehydration  · Tele monitor  · Neuro checks  · Fall precautions  · IVF hydration  · Follow-up x-ray shoulder and knee

## 2019-07-29 NOTE — H&P
H&P- Rogelio Schaeffer 1950, 81 y o  female MRN: [id number]    Unit/Bed#: ED 05 Encounter: [id number]    Primary Care Provider: Leia Borrero   Date and time admitted to hospital: 7/29/2019  3:00 PM        * Acute renal failure superimposed on stage 3 chronic kidney disease ([facility] [address] )  Assessment & Plan  Baseline creatinine 1 3  On admission creatinine 3 5, likely prerenal, due to dehydration, decreased PO intake and diuretic use  Patient was doing a bowel prep  UA bland  · Admit to inpatient  · IVF challenge  · Monitor UOP  · Hold nephrotoxic meds  · Serial labs to follow    Uncontrolled diabetes mellitus type 2 without complications [facility])  Assessment & Plan  Lab Results   Component Value Date    HGBA1C 8 4 (H) 03/09/2018       Recent Labs     07/29/19  1501   POCGLU 148*       Blood Sugar Average: Last 72 hrs:  (P) 148     Continue home lantus, place on ISS  Hold metfomin  Diabetic diet    Syncope and collapse  Assessment & Plan  Patient on liquid diet, doing colonoscopy prep and taking diuretics, with dizziness, lightheadedness and syncope  Left shoulder and knee pain  Has hx of PAF and SVT  Denied CP, palpitations  EKG showed NSR, braycardia  CT head with no acute intracranial findings  Orthostatics negative in ED  Most likely hypovolemia/dehydration  · Tele monitor  · Neuro checks  · Fall precautions  · IVF hydration  · Follow-up x-ray shoulder and knee      Mixed hyperlipidemia  Assessment & Plan  Hold statin given SEBASTIAN    Paroxysmal atrial fibrillation ([facility] [address] )  Assessment & Plan  Last echo from 5/2019 showed EF 55% with GIDD  Patient with mild bradycardia, in NSR   EKG noted  Place holding parameters on metoprolol, may require dose adjustment  Continue eliquis  Tele monitor given syncope    Morbid obesity with BMI of 45 0-49 9, adult (HCC)  Assessment & Plan  Advised weight loss, lifestyle changes    Asthma  Assessment & Plan  No evidence of exacerbation  Continue home meds        VTE Prophylaxis: Apixaban (Eliquis)  / sequential compression device   Code Status: FULL CODE    Anticipated Length of Stay:  Patient will be admitted on an Inpatient basis with an anticipated length of stay of  > 2 midnights  Justification for Hospital Stay: Acute renal failure, syncope    Total Time for Visit, including Counseling / Coordination of Care: 1 hour  Greater than 50% of this total time spent on direct patient counseling and coordination of care  Chief Complaint:   Syncope (States she was bending forward to put baby down on the floor " I think I passed out and fell over " Patient for colonscopy tomorrow, took her insulin this am and is drinking clear liquids ( broth and jello ) and will start cleansing at 5pm   FSBS 148 on arrival, states she feels extremely tired  Pain left knee and left shoulder  )      History of Present Illness:    Abbi Woods is a 81 y o  female with a PMH of HTN, T2DM on insulin, HDL, PAF on eliquis, morbid obeisty,  who presents with today with a syncopal event  Patient was planning an in out patient elective colonoscopy tomorrow and today had started drinking only a clear liquid diet  She is on a diuretic which she took this morning  Her last solid meal was yesterday at dinner time  All day today she has been feeling weak and dizzy as well as lightheaded  She was bending over today and fell over and had a very brief moment of loss of consciousness  She denies any loss of bowel or bladder function  She also denies any preceding symptoms including shortness of breath, palpitations, chest pain  In the ED orthostatics were negative  Creatinine was elevated to 3 5 from baseline of 1 3  CT head negative        Review of Systems:    Review of Systems   Constitutional: Positive for fatigue  HENT: Negative  Respiratory: Negative for cough, chest tightness and shortness of breath  Cardiovascular: Negative for chest pain, palpitations and leg swelling     Gastrointestinal: Positive for nausea  Negative for abdominal pain, constipation, diarrhea and vomiting  Endocrine: Negative  Genitourinary: Negative  Musculoskeletal: Negative  Neurological: Positive for dizziness, syncope, weakness (generalized) and light-headedness  Hematological: Negative  Psychiatric/Behavioral: Negative  All other systems reviewed and are negative  Past Medical and Surgical History:     Past Medical History:   Diagnosis Date    Asthma     Diabetes mellitus ([address] )     GERD (gastroesophageal reflux disease)     Hyperlipidemia     Hypertension     Kidney stones     PONV (postoperative nausea and vomiting)        Past Surgical History:   Procedure Laterality Date    APPENDECTOMY      CYSTOSCOPY W/ LASER LITHOTRIPSY Left 7/12/2016    Procedure: CYSTOSCOPY URETEROSCOPY WITH LITHOTRIPSY HOLMIUM LASER, RETROGRADE PYELOGRAM AND INSERTION STENT URETERAL;  Surgeon: Viji Stewart MD;  Location: [address] [place] [address];  Service:     DILATION AND CURETTAGE OF UTERUS      JOINT REPLACEMENT      right knee    KNEE ARTHROPLASTY Right     [place] CYSTOURETHROSCOPY,URETER CATHETER Left 6/29/2016    Procedure: CYSTOSCOPY RETROGRADE PYELOGRAM WITH INSERTION STENT URETERAL, left;  Surgeon: Viji Stewart MD;  Location: [facility];  Service: Urology    SHOULDER ARTHROTOMY Left        Meds/Allergies:    Prior to Admission medications    Medication Sig Start Date End Date Taking? Authorizing Provider   apixaban (ELIQUIS) 5 mg Take 1 tablet (5 mg total) by mouth 2 (two) times a day  Patient taking differently: Take 5 mg by mouth 2 (two) times a day Patient off Eliquis for EGD and colonscopy   Last dose 7/20 5/30/19  Yes Starla Dupont MD   Dulaglutide (TRULICITY) 1 5 AA/6 8BR SOPN Inject 1 5 mg under the skin once a week On hold at present   Yes Historical Provider, MD   Insulin Glargine (TOUJEO SOLOSTAR) injection pen 300 units/mL Inject 30 Units under the skin 2 (two) times a day    Yes Historical Provider, MD lisinopril (ZESTRIL) 10 mg tablet Take 10 mg by mouth daily   Yes Historical Provider, MD   metFORMIN (GLUCOPHAGE) 850 mg tablet Take 1 tablet by mouth 2 (two) times a day with meals 6/10/19  Yes Historical Provider, MD   metoprolol tartrate (LOPRESSOR) 100 mg tablet Take 1 tablet (100 mg total) by mouth every 12 (twelve) hours 5/30/19  Yes Johanna Menchaca MD   montelukast (SINGULAIR) 10 mg tablet Take 10 mg by mouth daily     Yes Historical Provider, MD   pregabalin (LYRICA) 100 mg capsule Take 100 mg by mouth 2 (two) times a day    Yes Historical Provider, MD   ranitidine (ZANTAC) 150 mg tablet Take 150 mg by mouth 2 (two) times a day as needed     Yes Historical Provider, MD   rosuvastatin (CRESTOR) 20 MG tablet Take 20 mg by mouth daily   Yes Historical Provider, MD   torsemide (DEMADEX) 20 mg tablet Take 20 mg by mouth daily  6/11/19  Yes Historical Provider, MD   albuterol (PROVENTIL HFA,VENTOLIN HFA) 90 mcg/act inhaler Inhale 2 puffs every 6 (six) hours as needed for wheezing    Historical Provider, MD   desloratadine (CLARINEX) 5 MG tablet Take by mouth    Historical Provider, MD   docusate sodium (COLACE) 100 mg capsule Take 1 capsule (100 mg total) by mouth 2 (two) times a day  Patient not taking: Reported on 6/5/2019 5/30/19   Johanna Menchaca MD   saccharomyces boulardii (FLORASTOR) 250 mg capsule Take 1 capsule (250 mg total) by mouth 2 (two) times a day  Patient not taking: Reported on 6/5/2019 5/30/19   Johanna Menchaca MD       Allergies:    Allergies   Allergen Reactions    Asa [Aspirin] GI Intolerance    Indocin [Indomethacin] Other (See Comments)     Made patient "loopy"    Penicillins Hives    Percocet [Oxycodone-Acetaminophen]        Social History:     Marital Status: Single   Substance Use History:   Social History     Substance and Sexual Activity   Alcohol Use Yes    Frequency: Monthly or less    Drinks per session: 1 or 2    Binge frequency: Never    Comment: rarely Social History     Tobacco Use   Smoking Status Former Smoker    Packs/day: 1 00    Years: 20 00    Pack years: 20 00    Types: Cigarettes    Last attempt to quit: 1996    Years since quittin 0   Smokeless Tobacco Never Used     Social History     Substance and Sexual Activity   Drug Use No       Family History:    non-contributory    Physical Exam:     Vitals:   Blood Pressure: 156/67 (19 1631)  Pulse: 60 (19 1631)  Temperature: 97 9 °F (36 6 °C) (19 1505)  Temp Source: Oral (19 1505)  Respirations: (!) 24 (19 1631)  Weight - Scale: 112 kg (246 lb 14 6 oz) (19 1505)  SpO2: 97 % (19 1505)    Physical Exam   Constitutional: She is oriented to person, place, and time  She appears well-developed  No distress  HENT:   Head: Normocephalic and atraumatic  Cardiovascular: Normal rate, regular rhythm and normal heart sounds  Pulmonary/Chest: Effort normal and breath sounds normal  No respiratory distress  She has no wheezes  She has no rales  Abdominal: Soft  Bowel sounds are normal  She exhibits no distension  There is no tenderness  There is no rebound and no guarding  Musculoskeletal: She exhibits no edema, tenderness or deformity  Mild point tenderness to palpation to the medial and lateral malleoli of the left elbow  Neurological: She is alert and oriented to person, place, and time  She displays normal reflexes  No cranial nerve deficit or sensory deficit  She exhibits normal muscle tone  Coordination normal    Skin: Skin is warm and dry  There is pallor  Psychiatric: She has a normal mood and affect  Her behavior is normal    Nursing note and vitals reviewed  Additional Data:     Lab Results: I have personally reviewed pertinent reports        Results from last 7 days   Lab Units 19  1519   WBC Thousand/uL 8 75   HEMOGLOBIN g/dL 10 9*   HEMATOCRIT % 34 7*   PLATELETS Thousands/uL 171   NEUTROS PCT % 54     Results from last 7 days   Lab Units 07/29/19  1519   SODIUM mmol/L 136   POTASSIUM mmol/L 5 1   CHLORIDE mmol/L 102   CO2 mmol/L 25   BUN mg/dL 65*   CREATININE mg/dL 3 53*   CALCIUM mg/dL 7 8*   TOTAL BILIRUBIN mg/dL 0 40   ALK PHOS U/L 47   ALT U/L 27   AST U/L 12         Results from last 7 days   Lab Units 07/29/19  1519   TROPONIN I ng/mL <0 02     Results from last 7 days   Lab Units 07/29/19  1501   POC GLUCOSE mg/dl 148*           Imaging: I have personally reviewed pertinent reports  CT head without contrast   Final Result by Chao Morales MD (07/29 1610)      No acute intracranial CT abnormality  Workstation performed: [id number]         XR shoulder 2+ views LEFT    (Results Pending)   XR knee 4+ views left injury    (Results Pending)       CT head without contrast   Final Result      No acute intracranial CT abnormality  Workstation performed: [id number]         XR shoulder 2+ views LEFT    (Results Pending)   XR knee 4+ views left injury    (Results Pending)       EKG, Pathology, and Other Studies Reviewed on Admission:   · EKG:  Normal sinus rhythm  59, bradycardia, no ischemic changes    Allscripts / Epic Records Reviewed: Yes     ** Please Note: This note has been constructed using a voice recognition system   **

## 2019-07-29 NOTE — ASSESSMENT & PLAN NOTE
Last echo from 5/2019 showed EF 55% with GIDD  Patient with mild bradycardia, in NSR   EKG noted  Place holding parameters on metoprolol, may require dose adjustment  Continue eliquis  Tele monitor given syncope

## 2019-07-29 NOTE — ED PROCEDURE NOTE
PROCEDURE  ECG 12 Lead Documentation Only  Date/Time: 7/29/2019 3:18 PM  Performed by: Markell Escamilla DO  Authorized by: Markell Escamilla DO     ECG reviewed by me, the ED Provider: yes    Patient location:  ED  Interpretation:     Interpretation: non-specific    Rate:     ECG rate:  59    ECG rate assessment: bradycardic    Rhythm:     Rhythm: sinus rhythm    Ectopy:     Ectopy: none    ST segments:     ST segments:  Normal  T waves:     T waves: normal           Markell Escamilla DO  07/29/19 1518

## 2019-07-29 NOTE — ASSESSMENT & PLAN NOTE
Lab Results   Component Value Date    HGBA1C 8 4 (H) 03/09/2018       Recent Labs     07/29/19  1501   POCGLU 148*       Blood Sugar Average: Last 72 hrs:  (P) 148     Continue home lantus, place on ISS  Hold metfomin  Diabetic diet

## 2019-07-29 NOTE — ED PROVIDER NOTES
History  Chief Complaint   Patient presents with    Syncope     States she was bending forward to put baby down on the floor " I think I passed out and fell over " Patient for colonscopy tomorrow, took her insulin this am and is drinking clear liquids ( broth and jello ) and will start cleansing at 5pm   FSBS 148 on arrival, states she feels extremely tired  Pain left knee and left shoulder  Patient presents for evaluation after a fall  Patient states she was watching her granddaughter today  She bent over and put the baby down  She then got lightheaded and passed out falling to her left side against the oven  She has been on clear liquids today in preparation for colonoscopy tomorrow  Did not start the prep yet  Eliquis has been on hold for the procedure as well  Complaining of some left shoulder and left knee pain  History provided by:  Patient   used: No    Syncope       Prior to Admission Medications   Prescriptions Last Dose Informant Patient Reported? Taking? Dulaglutide (TRULICITY) 1 5 EW/6 6WM SOPN Past Month at Unknown time Self Yes Yes   Sig: Inject 1 5 mg under the skin once a week On hold at present   Insulin Glargine (TOUJEO SOLOSTAR) injection pen 300 units/mL 7/29/2019 at 0700 Self Yes Yes   Sig: Inject 30 Units under the skin 2 (two) times a day    albuterol (PROVENTIL HFA,VENTOLIN HFA) 90 mcg/act inhaler More than a month at Unknown time Self Yes No   Sig: Inhale 2 puffs every 6 (six) hours as needed for wheezing   apixaban (ELIQUIS) 5 mg Past Month at Unknown time Self No Yes   Sig: Take 1 tablet (5 mg total) by mouth 2 (two) times a day   Patient taking differently: Take 5 mg by mouth 2 (two) times a day Patient off Eliquis for EGD and colonscopy   Last dose 7/20   desloratadine (CLARINEX) 5 MG tablet Not Taking at Unknown time Self Yes No   Sig: Take by mouth   lisinopril (ZESTRIL) 10 mg tablet 7/29/2019 at Unknown time Self Yes Yes   Sig: Take 10 mg by mouth daily   metFORMIN (GLUCOPHAGE) 850 mg tablet 7/29/2019 at Unknown time Self Yes Yes   Sig: Take 1 tablet by mouth 2 (two) times a day with meals   metoprolol tartrate (LOPRESSOR) 100 mg tablet 7/29/2019 at Unknown time Self No Yes   Sig: Take 1 tablet (100 mg total) by mouth every 12 (twelve) hours   montelukast (SINGULAIR) 10 mg tablet 7/29/2019 at Unknown time Self Yes Yes   Sig: Take 10 mg by mouth daily     pregabalin (LYRICA) 100 mg capsule 7/29/2019 at Unknown time Self Yes Yes   Sig: Take 100 mg by mouth 2 (two) times a day    ranitidine (ZANTAC) 150 mg tablet 7/29/2019 at Unknown time Self Yes Yes   Sig: Take 150 mg by mouth 2 (two) times a day as needed     rosuvastatin (CRESTOR) 20 MG tablet 7/29/2019 at Unknown time Self Yes Yes   Sig: Take 20 mg by mouth daily   saccharomyces boulardii (FLORASTOR) 250 mg capsule Not Taking at Unknown time Self No No   Sig: Take 1 capsule (250 mg total) by mouth 2 (two) times a day   Patient not taking: Reported on 6/5/2019   torsemide (DEMADEX) 20 mg tablet 7/29/2019 at Unknown time Self Yes Yes   Sig: Take 20 mg by mouth daily       Facility-Administered Medications: None       Past Medical History:   Diagnosis Date    [facility]     Asthma     CKD (chronic kidney disease)     Diabetes mellitus (HCC)     GERD (gastroesophageal reflux disease)     Hyperlipidemia     Hypertension     Kidney stones     PONV (postoperative nausea and vomiting)     SVT (supraventricular tachycardia) ([facility])        Past Surgical History:   Procedure Laterality Date    APPENDECTOMY      CYSTOSCOPY W/ LASER LITHOTRIPSY Left 7/12/2016    Procedure: CYSTOSCOPY URETEROSCOPY WITH LITHOTRIPSY HOLMIUM LASER, RETROGRADE PYELOGRAM AND INSERTION STENT URETERAL;  Surgeon: Ely Ferraro MD;  Location: [facility];  Service:     DILATION AND CURETTAGE OF UTERUS      JOINT REPLACEMENT      right knee    KNEE ARTHROPLASTY Right     MO CYSTOURETHROSCOPY,URETER CATHETER Left 6/29/2016 Procedure: CYSTOSCOPY RETROGRADE PYELOGRAM WITH INSERTION STENT URETERAL, left;  Surgeon: Dick Dominguez MD;  Location: [facility];  Service: Urology    SHOULDER ARTHROTOMY Left        Family History   Problem Relation Age of Onset    Heart disease Mother     Emphysema Maternal Grandmother     Heart disease Family      I have reviewed and agree with the history as documented  Social History     Tobacco Use    Smoking status: Former Smoker     Packs/day: 1 00     Years: 20 00     Pack years: 20 00     Types: Cigarettes     Last attempt to quit: 1996     Years since quittin 0    Smokeless tobacco: Never Used   Substance Use Topics    Alcohol use: Yes     Frequency: Monthly or less     Drinks per session: 1 or 2     Binge frequency: Never     Comment: rarely    Drug use: No        Review of Systems   Constitutional: Positive for fatigue  Cardiovascular: Positive for syncope  Musculoskeletal: Positive for arthralgias  Neurological: Positive for syncope  All other systems reviewed and are negative  Physical Exam  Physical Exam   Constitutional: She is oriented to person, place, and time  No distress  HENT:   Mouth/Throat: Oropharynx is clear and moist    Eyes: Pupils are equal, round, and reactive to light  Neck: Normal range of motion  Cardiovascular: Normal rate, regular rhythm and intact distal pulses  Pulmonary/Chest: Effort normal and breath sounds normal  No respiratory distress  Abdominal: Soft  There is no tenderness  Musculoskeletal:        Arms:       Legs:  Neurological: She is alert and oriented to person, place, and time  No cranial nerve deficit or sensory deficit  She exhibits normal muscle tone  Coordination normal    Skin: Capillary refill takes less than 2 seconds  She is not diaphoretic  Nursing note and vitals reviewed        Vital Signs  ED Triage Vitals [19 1505]   Temperature Pulse Respirations Blood Pressure SpO2   97 9 °F (36 6 °C) 62 18 142/65 97 %      Temp Source Heart Rate Source Patient Position - Orthostatic VS BP Location FiO2 (%)   Oral Monitor Lying Left arm --      Pain Score       8           Vitals:    07/29/19 1625 07/29/19 1627 07/29/19 1628 07/29/19 1631   BP: 126/59 139/64 147/67 156/67   Pulse: 60 68 63 60   Patient Position - Orthostatic VS: Lying - Orthostatic VS Sitting - Orthostatic VS Standing - Orthostatic VS Standing for 3 minutes - Orthostatic VS         Visual Acuity  Visual Acuity      Most Recent Value   L Pupil Size (mm)  2   R Pupil Size (mm)  2          ED Medications  Medications   sodium chloride 0 9 % bolus 1,000 mL (1,000 mL Intravenous New Bag 7/29/19 1523)       Diagnostic Studies  Results Reviewed     Procedure Component Value Units Date/Time    UA w Reflex to Microscopic [[id number]] Collected:  07/29/19 1557    Lab Status:  Final result Specimen:  Urine, Clean Catch Updated:  07/29/19 1605     Color, UA Light Yellow     Clarity, UA Slightly Cloudy     Specific [place], UA 1 020     pH, UA 5 0     Leukocytes, UA Negative     Nitrite, UA Negative     Protein, UA Negative mg/dl      Glucose, UA Negative mg/dl      Ketones, UA Negative mg/dl      Urobilinogen, UA 0 2 E U /dl      Bilirubin, UA Negative     Blood, UA Negative    Troponin I [[id number]]  (Normal) Collected:  07/29/19 1519    Lab Status:  Final result Specimen:  Blood from Arm, Left Updated:  07/29/19 1548     Troponin I <0 02 ng/mL     Comprehensive metabolic panel [[id number]]  (Abnormal) Collected:  07/29/19 1519    Lab Status:  Final result Specimen:  Blood from Arm, Left Updated:  07/29/19 1546     Sodium 136 mmol/L      Potassium 5 1 mmol/L      Chloride 102 mmol/L      CO2 25 mmol/L      ANION GAP 9 mmol/L      BUN 65 mg/dL      Creatinine 3 53 mg/dL      Glucose 145 mg/dL      Calcium 7 8 mg/dL      AST 12 U/L      ALT 27 U/L      Alkaline Phosphatase 47 U/L      Total Protein 6 7 g/dL      Albumin 3 5 g/dL      Total Bilirubin 0 40 mg/dL eGFR 13 ml/min/1 73sq m     Narrative:       [place] guidelines for Chronic Kidney Disease (CKD):     Stage 1 with normal or high GFR (GFR > 90 mL/min/1 73 square meters)    Stage 2 Mild CKD (GFR = 60-89 mL/min/1 73 square meters)    Stage 3A Moderate CKD (GFR = 45-59 mL/min/1 73 square meters)    Stage 3B Moderate CKD (GFR = 30-44 mL/min/1 73 square meters)    Stage 4 Severe CKD (GFR = 15-29 mL/min/1 73 square meters)    Stage 5 End Stage CKD (GFR <15 mL/min/1 73 square meters)  Note: GFR calculation is accurate only with a steady state creatinine    CBC and differential [[id number]]  (Abnormal) Collected:  07/29/19 1519    Lab Status:  Final result Specimen:  Blood from Arm, Left Updated:  07/29/19 1530     WBC 8 75 Thousand/uL      RBC 3 76 Million/uL      Hemoglobin 10 9 g/dL      Hematocrit 34 7 %      MCV 92 fL      MCH 29 0 pg      MCHC 31 4 g/dL      RDW 13 5 %      MPV 10 4 fL      Platelets 214 Thousands/uL      nRBC 0 /100 WBCs      Neutrophils Relative 54 %      Immat GRANS % 0 %      Lymphocytes Relative 32 %      Monocytes Relative 9 %      Eosinophils Relative 4 %      Basophils Relative 1 %      Neutrophils Absolute 4 80 Thousands/µL      Immature Grans Absolute 0 02 Thousand/uL      Lymphocytes Absolute 2 78 Thousands/µL      Monocytes Absolute 0 76 Thousand/µL      Eosinophils Absolute 0 32 Thousand/µL      Basophils Absolute 0 07 Thousands/µL     Fingerstick Glucose (POCT) [[id number]]  (Abnormal) Collected:  07/29/19 1501    Lab Status:  Final result Updated:  07/29/19 1503     POC Glucose 148 mg/dl                  CT head without contrast   Final Result by Herb Montemayor MD (07/29 1610)      No acute intracranial CT abnormality                    Workstation performed: [id number]         XR shoulder 2+ views LEFT    (Results Pending)   XR knee 4+ views left injury    (Results Pending)              Procedures  Procedures       ED Course MDM  Number of Diagnoses or Management Options  SEBASTIAN (acute kidney injury) [facility]):   Syncope:   Diagnosis management comments: Pulse ox 97% on RA indicating adequate oxygenation  Xray L knee: no fx or dislocation as read by me  Xray L shoulder: no fx or dislocation as read by me    Syncope possibly related to the change in deit and change in position today  However, patient not othrostatic in the ER and feels better after fluids  SEBASTIAN GFr decreased from 42 to 13  Amount and/or Complexity of Data Reviewed  Clinical lab tests: ordered and reviewed  Tests in the radiology section of CPT®: ordered and reviewed  Decide to obtain previous medical records or to obtain history from someone other than the patient: yes  Review and summarize past medical records: yes  Independent visualization of images, tracings, or specimens: yes    Patient Progress  Patient progress: stable      Disposition  Final diagnoses:   Syncope   SEBASTIAN (acute kidney injury) ([facility] [address] )     Time reflects when diagnosis was documented in both MDM as applicable and the Disposition within this note     Time User Action Codes Description Comment    7/29/2019  4:38 PM Kristin Gan [[id number]] Syncope     7/29/2019  4:38 PM Venus Briggs [[id number]] SEBASTIAN (acute kidney injury) [facility])       ED Disposition     ED Disposition Condition Date/Time Comment    Admit Stable Mon Jul 29, 2019  4:38 PM Case was discussed with Dr Wilbur Mcwilliams and the patient's admission status was agreed to be Admission Status: observation status to the service of Dr Wilbur Mcwilliams  Follow-up Information    None         Patient's Medications   Discharge Prescriptions    No medications on file     No discharge procedures on file      ED Provider  Electronically Signed by           Duke Tejeda DO  07/29/19 0792

## 2019-07-30 LAB
ANION GAP SERPL CALCULATED.3IONS-SCNC: 9 MMOL/L (ref 4–13)
ATRIAL RATE: 59 BPM
BUN SERPL-MCNC: 59 MG/DL (ref 5–25)
CALCIUM SERPL-MCNC: 7.5 MG/DL (ref 8.3–10.1)
CHLORIDE SERPL-SCNC: 107 MMOL/L (ref 100–108)
CK SERPL-CCNC: 77 U/L (ref 26–192)
CO2 SERPL-SCNC: 23 MMOL/L (ref 21–32)
CREAT SERPL-MCNC: 2.61 MG/DL (ref 0.6–1.3)
ERYTHROCYTE [DISTWIDTH] IN BLOOD BY AUTOMATED COUNT: 13.3 % (ref 11.6–15.1)
GFR SERPL CREATININE-BSD FRML MDRD: 18 ML/MIN/1.73SQ M
GLUCOSE SERPL-MCNC: 233 MG/DL (ref 65–140)
GLUCOSE SERPL-MCNC: 234 MG/DL (ref 65–140)
GLUCOSE SERPL-MCNC: 236 MG/DL (ref 65–140)
GLUCOSE SERPL-MCNC: 254 MG/DL (ref 65–140)
GLUCOSE SERPL-MCNC: 261 MG/DL (ref 65–140)
HCT VFR BLD AUTO: 33.6 % (ref 34.8–46.1)
HGB BLD-MCNC: 10.7 G/DL (ref 11.5–15.4)
MAGNESIUM SERPL-MCNC: 1.6 MG/DL (ref 1.6–2.6)
MCH RBC QN AUTO: 29.8 PG (ref 26.8–34.3)
MCHC RBC AUTO-ENTMCNC: 31.8 G/DL (ref 31.4–37.4)
MCV RBC AUTO: 94 FL (ref 82–98)
P AXIS: 10 DEGREES
PLATELET # BLD AUTO: 159 THOUSANDS/UL (ref 149–390)
PMV BLD AUTO: 11.1 FL (ref 8.9–12.7)
POTASSIUM SERPL-SCNC: 4.5 MMOL/L (ref 3.5–5.3)
PR INTERVAL: 170 MS
QRS AXIS: -19 DEGREES
QRSD INTERVAL: 74 MS
QT INTERVAL: 432 MS
QTC INTERVAL: 427 MS
RBC # BLD AUTO: 3.59 MILLION/UL (ref 3.81–5.12)
SODIUM SERPL-SCNC: 139 MMOL/L (ref 136–145)
T WAVE AXIS: 24 DEGREES
TSH SERPL DL<=0.05 MIU/L-ACNC: 2.02 UIU/ML (ref 0.36–3.74)
VENTRICULAR RATE: 59 BPM
WBC # BLD AUTO: 7.2 THOUSAND/UL (ref 4.31–10.16)

## 2019-07-30 PROCEDURE — 82550 ASSAY OF CK (CPK): CPT | Performed by: INTERNAL MEDICINE

## 2019-07-30 PROCEDURE — 82948 REAGENT STRIP/BLOOD GLUCOSE: CPT

## 2019-07-30 PROCEDURE — 93010 ELECTROCARDIOGRAM REPORT: CPT | Performed by: INTERNAL MEDICINE

## 2019-07-30 PROCEDURE — 85027 COMPLETE CBC AUTOMATED: CPT | Performed by: STUDENT IN AN ORGANIZED HEALTH CARE EDUCATION/TRAINING PROGRAM

## 2019-07-30 PROCEDURE — 99223 1ST HOSP IP/OBS HIGH 75: CPT | Performed by: INTERNAL MEDICINE

## 2019-07-30 PROCEDURE — G8978 MOBILITY CURRENT STATUS: HCPCS

## 2019-07-30 PROCEDURE — G8987 SELF CARE CURRENT STATUS: HCPCS

## 2019-07-30 PROCEDURE — 83735 ASSAY OF MAGNESIUM: CPT | Performed by: STUDENT IN AN ORGANIZED HEALTH CARE EDUCATION/TRAINING PROGRAM

## 2019-07-30 PROCEDURE — 97163 PT EVAL HIGH COMPLEX 45 MIN: CPT

## 2019-07-30 PROCEDURE — 80048 BASIC METABOLIC PNL TOTAL CA: CPT | Performed by: STUDENT IN AN ORGANIZED HEALTH CARE EDUCATION/TRAINING PROGRAM

## 2019-07-30 PROCEDURE — 97166 OT EVAL MOD COMPLEX 45 MIN: CPT

## 2019-07-30 PROCEDURE — 97535 SELF CARE MNGMENT TRAINING: CPT

## 2019-07-30 PROCEDURE — 84443 ASSAY THYROID STIM HORMONE: CPT | Performed by: INTERNAL MEDICINE

## 2019-07-30 PROCEDURE — G8979 MOBILITY GOAL STATUS: HCPCS

## 2019-07-30 PROCEDURE — 99232 SBSQ HOSP IP/OBS MODERATE 35: CPT | Performed by: INTERNAL MEDICINE

## 2019-07-30 PROCEDURE — G8988 SELF CARE GOAL STATUS: HCPCS

## 2019-07-30 RX ORDER — SODIUM CHLORIDE 9 MG/ML
75 INJECTION, SOLUTION INTRAVENOUS CONTINUOUS
Status: DISCONTINUED | OUTPATIENT
Start: 2019-07-30 | End: 2019-08-01

## 2019-07-30 RX ADMIN — LORATADINE 10 MG: 10 TABLET ORAL at 08:09

## 2019-07-30 RX ADMIN — Medication 250 MG: at 17:07

## 2019-07-30 RX ADMIN — PREGABALIN 100 MG: 100 CAPSULE ORAL at 17:07

## 2019-07-30 RX ADMIN — PREGABALIN 100 MG: 100 CAPSULE ORAL at 08:09

## 2019-07-30 RX ADMIN — INSULIN LISPRO 3 UNITS: 100 INJECTION, SOLUTION INTRAVENOUS; SUBCUTANEOUS at 08:10

## 2019-07-30 RX ADMIN — INSULIN GLARGINE 30 UNITS: 100 INJECTION, SOLUTION SUBCUTANEOUS at 08:11

## 2019-07-30 RX ADMIN — FAMOTIDINE 20 MG: 20 TABLET ORAL at 08:10

## 2019-07-30 RX ADMIN — APIXABAN 5 MG: 5 TABLET, FILM COATED ORAL at 08:09

## 2019-07-30 RX ADMIN — MONTELUKAST SODIUM 10 MG: 10 TABLET, FILM COATED ORAL at 21:51

## 2019-07-30 RX ADMIN — INSULIN LISPRO 3 UNITS: 100 INJECTION, SOLUTION INTRAVENOUS; SUBCUTANEOUS at 17:08

## 2019-07-30 RX ADMIN — METOPROLOL TARTRATE 100 MG: 100 TABLET, FILM COATED ORAL at 08:09

## 2019-07-30 RX ADMIN — INSULIN LISPRO 3 UNITS: 100 INJECTION, SOLUTION INTRAVENOUS; SUBCUTANEOUS at 11:42

## 2019-07-30 RX ADMIN — INSULIN GLARGINE 30 UNITS: 100 INJECTION, SOLUTION SUBCUTANEOUS at 21:51

## 2019-07-30 RX ADMIN — SODIUM CHLORIDE 125 ML/HR: 0.9 INJECTION, SOLUTION INTRAVENOUS at 05:23

## 2019-07-30 RX ADMIN — APIXABAN 5 MG: 5 TABLET, FILM COATED ORAL at 17:07

## 2019-07-30 RX ADMIN — Medication 250 MG: at 08:11

## 2019-07-30 NOTE — OCCUPATIONAL THERAPY NOTE
Occupational Therapy Evaluation/Treatment       07/30/19 1420   Note Type   Note type Eval/Treat   Restrictions/Precautions   Other Precautions Pain; Fall Risk   Pain Assessment   Pain Assessment 0-10   Pain Score 6   Pain Type Acute pain   Pain Location Knee   Pain Orientation Left   Hospital Pain Intervention(s) Ambulation/increased activity;Repositioned; Emotional support   Response to Interventions some relief   Home Living   Type of Home Apartment  (1st floor)   Home Layout One level;Stairs to enter with rails  (6 JESS)   Bathroom Shower/Tub Tub/shower unit   Bathroom Toilet Standard   Home Equipment Walker;Cane   Prior Function   Level of [place] Independent with ADLs and functional mobility   Lives With Alone   Receives Help From Family   ADL Assistance Independent   IADLs Independent   Falls in the last 6 months 1 to 4   Comments Ambulates without AD PTA  Helps with  for grand nieces  Drives  Psychosocial   Psychosocial (WDL) WDL   Subjective   Subjective "I still have a little pain in my knee from when I fell "   ADL   Where Assessed Chair   Eating Assistance 7  Independent   Grooming Assistance 5  Supervision/Setup   UB Bathing Assistance 5  Supervision/Setup   LB Bathing Assistance 4  Minimal Assistance   UB Dressing Assistance 5  Supervision/Setup   LB Dressing Assistance 5  Supervision/Setup   Toileting Assistance  5  Supervision/Setup   Bed Mobility   Rolling R 7  Independent   Rolling L 7  Independent   Supine to Sit 7  Independent   Sit to Supine 5  Supervision   Transfers   Sit to Stand 5  Supervision   Stand to Sit 5  Supervision   Stand pivot 5  Supervision   Balance   Static Sitting Good   Dynamic Sitting Fair +   Static Standing Fair +   Dynamic Standing Fair   Activity Tolerance   Activity Tolerance Patient limited by pain; Patient limited by fatigue   RUE Assessment   RUE Assessment WFL   LUE Assessment   LUE Assessment WFL   Hand Function   Gross Motor Coordination Functional Fine Motor Coordination Functional   Vision-Basic Assessment   Current Vision Wears glasses for distance only   Cognition   Overall Cognitive Status WFL   Arousal/Participation Alert; Cooperative   Attention Within functional limits   Orientation Level Oriented X4   Memory Within functional limits   Following Commands Follows all commands and directions without difficulty   Assessment   Limitation Decreased ADL status; Decreased UE strength;Decreased endurance;Decreased self-care trans;Decreased high-level ADLs  (decreased balance and mobility)   Prognosis Good   Assessment Patient evaluated by Occupational Therapy  Patient admitted with Acute renal failure superimposed on stage 3 chronic kidney disease ([facility] [address] )  The patients occupational profile, medical and therapy history includes a extensive additional review of physical, cognitive, or psychosocial history related to current functional performance  Comorbidities affecting functional mobility and ADLS include: HTN, Y1OJ on insulin, HDL, PAF on eliquis, morbid obeisty  Prior to admission, patient was independent with functional mobility without assistive device, independent with ADLS, independent with IADLS and ambulating community distances, lives alone in 1st floor apartment with 6 steps to enter with bilateral rails  The evaluation identifies the following performance deficits: weakness, impaired balance, decreased endurance, increased fall risk, decreased ADLS, decreased IADLS, pain, decreased activity tolerance and decreased safety awareness, that result in activity limitations and/or participation restrictions  This evaluation requires clinical decision making of moderate complexity, because the patient may present with comorbidities that affect occupational performance and required minimal or moderate modification of tasks or assistance with the consideration of several treatment options    The Barthel Index was used as a functional outcome tool presenting with a score of 65, indicating moderate limitations of functional mobility and ADLS  Patient will benefit from skilled Occupational Therapy services to address above deficits and facilitate a safe return to prior level of function  Goals   STG Time Frame   (1-7)   Short Term Goal #1 Patient will increase standing tolerance to 5 minutes during ADL task to decrease assistance level and decrease fall risk; Patient will increase functional mobility to and from bathroom with no assistive device with supervision to increase performance with ADLS and to use a toilet; Patient will improve functional activity tolerance to 10 minutes of sustained functional tasks to increase participation in basic self-care and decrease assistance level;  Patient will be able to to verbalize understanding and perform energy conservation/proper body mechanics during ADLS and functional mobility at least 75% of the time with minimal cueing to decrease signs of fatigue and increase stamina to return to prior level of function; Patient will increase dynamic sitting balance to good to improve the ability to sit at edge of bed or on a chair for ADLS;  Patient will increase dynamic standing balance to fair+ to improve postural stability and decrease fall risk during standing ADLS and transfers       LTG Time Frame   (8-14)   Long Term Goal #1 Patient will increase standing tolerance to 10 minutes during ADL task to decrease assistance level and decrease fall risk; Patient will increase functional mobility to and from bathroom with no assistive device independently to increase performance with ADLS and to use a toilet; Patient will improve functional activity tolerance to 20 minutes of sustained functional tasks to increase participation in basic self-care and decrease assistance level;  Patient will be able to to verbalize understanding and perform energy conservation/proper body mechanics during ADLS and functional mobility at least 90% of the time with no cueing to decrease signs of fatigue and increase stamina to return to prior level of function; Patient will increase static/dynamic standing balance to good to improve postural stability and decrease fall risk during standing ADLS and transfers  Functional Transfer Goals   Pt Will Perform All Functional Transfers With mod indep; With good judgment/safety  (LTG- Independent)   ADL Goals   Pt Will Perform Bathing In shower/tub seat; With stand by assist;With good judgment/safety; With setup  (LTG- Independent)   Pt Will Perform LE Dressing In chair; With stand by assist;With setup  (LTG- Independent)   Plan   Treatment Interventions ADL retraining;UE strengthening/ROM; Functional transfer training; Endurance training;Patient/family training;Equipment evaluation/education; Compensatory technique education; Energy conservation   Goal Expiration Date 08/13/19   OT Frequency 3-5x/wk   Additional Treatment Session   Start Time 4057   End Time 2362   Treatment Assessment Pt seen for ADL training  Education and training begun with pt regarding energy conservation/proper body mechanics during ADLS and functional mobility to decrease signs of fatigue and increase stamina needed to return to prior level of function  Pt demonstrating good verbal understanding of all information provided and all questions answered  Functional ambulation 150 feet with Jose holding IV pole for support to retrieve ADL supplies in hallway  Toileting with Jose for clothing management and steadying  Pt able to stand at sink for 5 minutes to wash hands and detangle matted hair  Should progress well with therapy  Recommended home independent with occasional family support as needed  Patient returned to bed with all needs within reach, SCD pumps in place  Cont OT per POC      Recommendation   OT Discharge Recommendation Home independent  (occasional family support)   Barthel Index   Feeding 10   Bathing 0   Grooming Score 0   Dressing Score 5 Bladder Score 10   Bowels Score 10   Toilet Use Score 5   Transfers (Bed/Chair) Score 10   Mobility (Level Surface) Score 10   Stairs Score 5   Barthel Index Score 65   Licensure   [place] License Number  Campbell Edwards Claudette [place] [address], OTR/L, [place] Lic# [id number]

## 2019-07-30 NOTE — UTILIZATION REVIEW
Initial Clinical Review    Admission: Date/Time/Statement: 7/29/19 @ 1735     Orders Placed This Encounter   Procedures    Inpatient Admission     Standing Status:   Standing     Number of Occurrences:   1     Order Specific Question:   Admitting Physician     Answer:   Autumn Massey [[id number]]     Order Specific Question:   Level of Care     Answer:   Med Surg [16]     Order Specific Question:   Bed request comments     Answer:   with tele     Order Specific Question:   Estimated length of stay     Answer:   More than 2 Midnights     Order Specific Question:   Certification     Answer:   I certify that inpatient services are medically necessary for this patient for a duration of greater than two midnights  See H&P and MD Progress Notes for additional information about the patient's course of treatment  ED Arrival Information     Expected Arrival Acuity Means of Arrival Escorted By Service Admission Type    - 7/29/2019 14:59 [facility] [address]        Chief Complaint   Patient presents with    Syncope     States she was bending forward to put baby down on the floor " I think I passed out and fell over " Patient for colonscopy tomorrow, took her insulin this am and is drinking clear liquids ( broth and jello ) and will start cleansing at 5pm   FSBS 148 on arrival, states she feels extremely tired  Pain left knee and left shoulder  Assessment/Plan:   Patient presents for evaluation after a fall  Patient states she was watching her granddaughter today  She bent over and put the baby down  She then got lightheaded and passed out falling to her left side against the oven  She has been on clear liquids today in preparation for colonoscopy tomorrow  Did not start the prep yet  Eliquis has been on hold for the procedure as well   Complaining of some left shoulder and left knee pain  Acute renal failure superimposed on stage 3 chronic kidney disease [facility])  Assessment & Plan  Baseline creatinine 1 3  On admission creatinine 3 5, likely prerenal, due to dehydration, decreased PO intake and diuretic use  Patient was doing a bowel prep  UA bland  · Admit to inpatient  · IVF challenge  · Monitor UOP  · Hold nephrotoxic meds  · Serial labs to follow  Syncope and collapse  Assessment & Plan  Patient on liquid diet, doing colonoscopy prep and taking diuretics, with dizziness, lightheadedness and syncope  Left shoulder and knee pain  Has hx of PAF and SVT  Denied CP, palpitations     EKG showed NSR, braycardia  CT head with no acute intracranial findings  Orthostatics negative in ED  Most likely hypovolemia/dehydration  · Tele monitor  · Neuro checks  · Fall precautions  · IVF hydration  · Follow-up x-ray shoulder and knee  ED Triage Vitals [07/29/19 1505]   Temperature Pulse Respirations Blood Pressure SpO2   97 9 °F (36 6 °C) 62 18 142/65 97 %      Temp Source Heart Rate Source Patient Position - Orthostatic VS BP Location FiO2 (%)   Oral Monitor Lying Left arm --      Pain Score       8        Wt Readings from Last 1 Encounters:   07/29/19 114 kg (251 lb 11 2 oz)     Additional Vital Signs:   07/29/19 1631    60  24Abnormal   156/67      Standing for 3 minutes - Orthostatic VS   07/29/19 1628    63  16  147/67      Standing - Orthostatic VS   07/29/19 1627    68  22  139/64      Sitting - Orthostatic VS   07/29/19 1625    60  20  126/59      Lying - Orthostatic    Pertinent Labs/Diagnostic Test Results:   Results from last 7 days   Lab Units 07/30/19  0603 07/29/19  1519   WBC Thousand/uL 7 20 8 75   HEMOGLOBIN g/dL 10 7* 10 9*   HEMATOCRIT % 33 6* 34 7*   PLATELETS Thousands/uL 159 171   NEUTROS ABS Thousands/µL  --  4 80     Results from last 7 days   Lab Units 07/30/19  0603 07/29/19  1519   SODIUM mmol/L 139 136   POTASSIUM mmol/L 4 5 5 1   CHLORIDE mmol/L 107 102   CO2 mmol/L 23 25   ANION GAP mmol/L 9 9   BUN mg/dL 59* 65*   CREATININE mg/dL 2 61* 3 53*   EGFR ml/min/1 73sq m 18 13   CALCIUM mg/dL 7 5* 7 8*   MAGNESIUM mg/dL 1 6  --      Results from last 7 days   Lab Units 07/29/19  1519   AST U/L 12   ALT U/L 27   ALK PHOS U/L 47   TOTAL PROTEIN g/dL 6 7   ALBUMIN g/dL 3 5   TOTAL BILIRUBIN mg/dL 0 40     Results from last 7 days   Lab Units 07/30/19  0714 07/29/19  1940 07/29/19  1501   POC GLUCOSE mg/dl 234* 135 148*     Results from last 7 days   Lab Units 07/30/19  0603 07/29/19  1519   GLUCOSE RANDOM mg/dL 236* 145*     Results from last 7 days   Lab Units 07/29/19  1519   TROPONIN I ng/mL <0 02     Results from last 7 days   Lab Units 07/30/19  0603   TSH 3RD GENERATON uIU/mL 2 017     Results from last 7 days   Lab Units 07/29/19  1557   CLARITY UA  Slightly Cloudy   COLOR UA  Light Yellow   SPEC GRAV UA  1 020   PH UA  5 0   GLUCOSE UA mg/dl Negative   KETONES UA mg/dl Negative   BLOOD UA  Negative   PROTEIN UA mg/dl Negative   NITRITE UA  Negative   BILIRUBIN UA  Negative   UROBILINOGEN UA E U /dl 0 2   LEUKOCYTES UA  Negative     CT HEAD=No acute intracranial CT abnormality  L KNEE XRAY=No acute osseous abnormality  Moderate degenerative changes in the left knee  L SHOULDER XRAY=No acute osseous abnormality    EKG=ECG rate:  59    ECG rate assessment: bradycardic    Rhythm:     Rhythm: sinus rhythm    Ectopy:     Ectopy: none    ST segments:     ST segments:  Normal  T waves:     T waves: normal    ED Treatment:   Medication Administration from 07/29/2019 1459 to 07/29/2019 1905       Date/Time Order Dose Route Action Action by Comments     07/29/2019 1752 sodium chloride 0 9 % bolus 1,000 mL 0 mL Intravenous Stopped Omar Irwin RN      07/29/2019 1523 sodium chloride 0 9 % bolus 1,000 mL 1,000 mL Intravenous New Bag Omar Irwin RN         Past Medical History:   Diagnosis Date    A-fib [facility])     Asthma     CKD (chronic kidney disease)     Diabetes mellitus ([address] )     GERD (gastroesophageal reflux disease)     Hyperlipidemia     Hypertension     Kidney stones     PONV (postoperative nausea and vomiting)     SVT (supraventricular tachycardia) (HCC)      Present on Admission:   Acute renal failure superimposed on stage 3 chronic kidney disease (HCC)   Uncontrolled diabetes mellitus type 2 without complications (HCC)   Asthma   Paroxysmal atrial fibrillation (HCC)   Mixed hyperlipidemia   Syncope and collapse  Admitting Diagnosis: Syncope [R55]  SEBASTIAN (acute kidney injury) ([facility] [address] ) [N17 9]  Age/Sex: 81 y o  female  Admission Orders:  TELEMETRY  PT/OT EVAL & TX  ACCUCHECKS WITH COVERAGE SCALE  INCENTIVE SPIROMETRY  VENODYNES  ORTHOSTATIC VS  CONSULT CARDIO  Current Facility-Administered Medications:  acetaminophen 650 mg Oral Q6H PRN   albuterol 2 puff Inhalation Q6H PRN   apixaban 5 mg Oral BID   famotidine 20 mg Oral Daily   insulin glargine 30 Units Subcutaneous Q12H [address]   insulin lispro 1-6 Units Subcutaneous TID AC   loratadine 10 mg Oral Daily   metoprolol tartrate 100 mg Oral Q12H SELMA   montelukast 10 mg Oral HS   ondansetron 4 mg Intravenous Q6H PRN   pregabalin 100 mg Oral BID   saccharomyces boulardii 250 mg Oral BID   sodium chloride 75 mL/hr Intravenous Continuous     Network Utilization Review Department  Phone: [phone]; Fax [phone]  [email or web address]  ATTENTION: Please call with any questions or concerns to [phone]  and carefully listen to the prompts so that you are directed to the right person  Send all requests for admission clinical reviews, approved or denied determinations and any other requests to fax [phone]   All voicemails are confidential

## 2019-07-30 NOTE — PROGRESS NOTES
[facility] Progress Note  Patient: Siena Villarreal 81 y o  female   MRN: [id number]  PCP: Nick Doe  Unit/Bed#: [address] [place] [address] Encounter: [id number]  Date Of Visit: 07/30/19    Problem List:    Principal Problem:    Syncope and collapse  Active Problems:    Acute renal failure superimposed on stage 3 chronic kidney disease ([place] [address] )    Uncontrolled diabetes mellitus type 2 without complications (HCC)    Paroxysmal atrial fibrillation ([place] [address] )    Mixed hyperlipidemia    Asthma    Morbid obesity with BMI of 45 0-49 9, adult ([place] [address] )    Anemia      Assessment & Plan:    Acute renal failure superimposed on stage 3 chronic kidney disease ([place] [address] )  Assessment & Plan  Baseline creatinine 1 3  On admission creatinine 3 5, unclear etiology   Possibilities includes pre renal setting of diuretic and ACE inhibitor use  Recent hospitalization with a KI in setting of gastroenteritis with subsequently improved  Patient reports last blood work around 2 weeks ago with PMD, was told that her kidney function was fine  Denied any changes in medications recently except addition of ranitidine  UA bland  CT scan of the abdomen and pelvis last month without any evidence of hydronephrosis  · Improving with IV fluids  · Continue to hold torsemide and lisinopril  · Check PVR  · Orthostasis was negative at presentation, monitor blood pressure  · Hold nephrotoxic meds  · Considered nephrology evaluation    * Syncope and collapse  Assessment & Plan  Patient presented with syncopal episode from standing position  Noted to have acute kidney injury  Orthostasis negative  Has hx of PAF and SVT  Denied CP, palpitations     EKG showed NSR, telemetry-braycardia  CT head with no acute intracranial findings  Possibly related to dehydration plus/minus bradycardia  · Tele monitor  · Neuro checks-stable  · Decrease metoprolol  · Continue to hold lisinopril and torsemide  · Fall precautions, x-ray of the left knee and left shoulder without any acute abnormality  · Continue IV fluid  · Cardiology evaluation      Paroxysmal atrial fibrillation [facility])  Assessment & Plan  Last echo from 5/2019 showed EF 55% with GIDD  Patient with mild bradycardia, in NSR  EKG noted  Decrease metoprolol with holding parameter  Continue eliquis  Tele monitor given syncope  Follow up Cardiology recommendation    Uncontrolled diabetes mellitus type 2 without complications [facility])  Assessment & Plan  Lab Results   Component Value Date    HGBA1C 8 4 (H) 03/09/2018       Recent Labs     07/30/19  0714 07/30/19  1119 07/30/19  1621 07/30/19  2057   POCGLU 234* 254* 233* 261*       Blood Sugar Average: Last 72 hrs:  (P) 580 [id number]     Continue home lantus, place on ISS  Hold metfomin  Diabetic diet    Mixed hyperlipidemia  Assessment & Plan  Hold statin given SEBASTIAN    Anemia  Assessment & Plan  No overt bleeding  Normocytic  Will check iron studies, B12 folate  Monitor    Morbid obesity with BMI of 45 0-49 9, adult (HCC)  Assessment & Plan  Advised weight loss, lifestyle changes    Asthma  Assessment & Plan  No evidence of exacerbation  Continue home meds        VTE Pharmacologic Prophylaxis:   Pharmacologic: Apixaban (Eliquis)  Mechanical VTE Prophylaxis in Place: Yes    Patient Centered Rounds: I have performed bedside rounds with nursing staff today  Discussions with Specialists or Other Care Team Provider:  Dr Silas Ibarra    Education and Discussions with Family / Patient:  Yes    Time Spent for Care: 45 minutes  More than 50% of total time spent on counseling and coordination of care as described above      Current Length of Stay: 1 day(s)    Current Patient Status: Inpatient   Certification Statement: The patient will continue to require additional inpatient hospital stay due to Acute kidney injury    Discharge Plan:  Home when clinically    Code Status: Level 1 - Full Code      Subjective:   Reports feeling better  Denies any chest pain shortness of breath or dizziness  Mild left knee pain after fall but improving    Patient reported compliance with the medication  She also reported she was slowly improving since hospitalization last month though initially had poor p o  Intake  She had followed up with PMD and blood test was done around 2 weeks ago and she was told that her kidney test was fine  Denies any complains of is nausea vomiting diarrhea, change in medication, urinary complaints over last 2 weeks      Objective:     Vitals:   Temp (24hrs), Av °F (36 7 °C), Min:97 7 °F (36 5 °C), Max:98 3 °F (36 8 °C)    Temp:  [97 7 °F (36 5 °C)-98 3 °F (36 8 °C)] 97 9 °F (36 6 °C)  HR:  [58-77] 62  Resp:  [18-20] 18  BP: (111-132)/(54-63) 120/56  SpO2:  [98 %-99 %] 99 %  Body mass index is 46 04 kg/m²  Input and Output Summary (last 24 hours): Intake/Output Summary (Last 24 hours) at 2019  Last data filed at 2019 1744  Gross per 24 hour   Intake 5627 5 ml   Output 500 ml   Net 5127 5 ml       Physical Exam:     Physical Exam   Constitutional: She appears well-developed  No distress  Morbidly obese   HENT:   Head: Normocephalic and atraumatic  Nose: Nose normal    Eyes: Pupils are equal, round, and reactive to light  Conjunctivae are normal    Neck: Normal range of motion  Neck supple  Cardiovascular: Normal rate, regular rhythm and normal heart sounds  Pulmonary/Chest: Effort normal and breath sounds normal  No respiratory distress  She has no wheezes  She has no rales  Diminished   Abdominal: Soft  Bowel sounds are normal  She exhibits no distension  There is no tenderness  There is no rebound and no guarding  Musculoskeletal: She exhibits no edema  Neurological: She is alert  No cranial nerve deficit  Skin: Skin is warm and dry  No rash noted  Psychiatric: She has a normal mood and affect         Additional Data:     Labs:    Results from last 7 days   Lab Units 19  0603 19  1519   WBC Thousand/uL 7 20 8 75 HEMOGLOBIN g/dL 10 7* 10 9*   HEMATOCRIT % 33 6* 34 7*   PLATELETS Thousands/uL 159 171   NEUTROS PCT %  --  54   LYMPHS PCT %  --  32   MONOS PCT %  --  9   EOS PCT %  --  4     Results from last 7 days   Lab Units 07/30/19  0603 07/29/19  1519   POTASSIUM mmol/L 4 5 5 1   CHLORIDE mmol/L 107 102   CO2 mmol/L 23 25   BUN mg/dL 59* 65*   CREATININE mg/dL 2 61* 3 53*   CALCIUM mg/dL 7 5* 7 8*   ALK PHOS U/L  --  47   ALT U/L  --  27   AST U/L  --  12           * I Have Reviewed All Lab Data Listed Above  * Additional Pertinent Lab Tests Reviewed: All Labs Within Last 24 Hours Reviewed      Imaging:  Imaging Reports Reviewed Today Include:  CT scan, x-ray      Recent Cultures (last 7 days):           Last 24 Hours Medication List:     Current Facility-Administered Medications:  acetaminophen 650 mg Oral Q6H PRN Omar Rivera MD    albuterol 2 puff Inhalation Q6H PRN Omar Rivera MD    apixaban 5 mg Oral BID Omar Rivera MD    famotidine 20 mg Oral Daily Omar Rivera MD    insulin glargine 30 Units Subcutaneous Q12H [address] [facility]    insulin lispro 1-6 Units Subcutaneous TID AC Omar Rivera MD    loratadine 10 mg Oral Daily Omar Rivera MD    metoprolol tartrate 75 mg Oral Q12H [address] Lewis Daniel MD    montelukast 10 mg Oral HS Oamr Rivera MD    ondansetron 4 mg Intravenous Q6H PRN Omar Rivera MD    pregabalin 100 mg Oral BID Omar Rivera MD    saccharomyces boulardii 250 mg Oral BID Omar Rivera MD    sodium chloride 75 mL/hr Intravenous Continuous Maxwell Ovalle MD Last Rate: 75 mL/hr (07/30/19 0946)          Today, Patient Was Seen By: Maxwell Ovalle MD    ** Please Note: "This note has been constructed using a voice recognition system  Therefore there may be syntax, spelling, and/or grammatical errors   Please call if you have any questions  "**

## 2019-07-30 NOTE — PLAN OF CARE
Problem: Potential for Falls  Goal: Patient will remain free of falls  Description  INTERVENTIONS:  - Assess patient frequently for physical needs  -  Identify cognitive and physical deficits and behaviors that affect risk of falls    -  [place] fall precautions as indicated by assessment   - Educate patient/family on patient safety including physical limitations  - Instruct patient to call for assistance with activity based on assessment  - Modify environment to reduce risk of injury  - Consider OT/PT consult to assist with strengthening/mobility  Outcome: Progressing     Problem: METABOLIC, FLUID AND ELECTROLYTES - ADULT  Goal: Electrolytes maintained within normal limits  Description  INTERVENTIONS:  - Monitor labs and assess patient for signs and symptoms of electrolyte imbalances  - Administer electrolyte replacement as ordered  - Monitor response to electrolyte replacements, including repeat lab results as appropriate  - Instruct patient on fluid and nutrition as appropriate  Outcome: Progressing  Goal: Glucose maintained within target range  Description  INTERVENTIONS:  - Monitor Blood Glucose as ordered  - Assess for signs and symptoms of hyperglycemia and hypoglycemia  - Administer ordered medications to maintain glucose within target range  - Assess nutritional intake and initiate nutrition service referral as needed  Outcome: Progressing     Problem: PAIN - ADULT  Goal: Verbalizes/displays adequate comfort level or baseline comfort level  Description  Interventions:  - Encourage patient to monitor pain and request assistance  - Assess pain using appropriate pain scale  - Administer analgesics based on type and severity of pain and evaluate response  - Implement non-pharmacological measures as appropriate and evaluate response  - Consider cultural and social influences on pain and pain management  - Notify physician/advanced practitioner if interventions unsuccessful or patient reports new pain  Outcome: Progressing     Problem: DISCHARGE PLANNING  Goal: Discharge to home or other facility with appropriate resources  Description  INTERVENTIONS:  - Identify barriers to discharge w/patient and caregiver  - Arrange for needed discharge resources and transportation as appropriate  - Identify discharge learning needs (meds, wound care, etc )  - Refer to Case Management Department for coordinating discharge planning if the patient needs post-hospital services based on physician/advanced practitioner order or complex needs related to functional status, cognitive ability, or social support system   Outcome: Progressing

## 2019-07-30 NOTE — PROGRESS NOTES
07/30/19 [address]   Patient Information   Mental Status Alert   Primary Caregiver Self   Support System Immediate family   Legal Information   Advance Directives Living will;Power of  for health care   Advance Directives Status Second request   Activities of Daily Living Prior to Admission   Functional Status Minimum assistance   Assistive Device Walker   Living Arrangement Apartment   Ambulation Minimum assistance   Means of Transportation   Means of Transport to Appts: Drive Self     LOS/Readmit/MBP: LOS is 1 day, pt is a MBP and letter provided to her in May  Pt is not a readmission  Lives alone   in a 1 level apartment and has 6 JESS  BR on the same floor  Pt has a cane and RW in the home for DME and has no oxygen or neb  Pt has no HHC  PCP: Dr Fermin Banks  Pt has no advanced directives, pt has paperwork but has not yet completed it  Pt does drive, and is relatively independent  Pt indicated that she has poss plans to move in with her Niece eventually as they just bought a house and are putting extra rooms on it  Pharmacy is [facility], pt notes no issues with prescription plan    Pt denies any current needs at this time, remains [facility]BP [facility]

## 2019-07-31 ENCOUNTER — APPOINTMENT (INPATIENT)
Dept: NON INVASIVE DIAGNOSTICS | Facility: HOSPITAL | Age: 69
DRG: 683 | End: 2019-07-31
Payer: MEDICARE

## 2019-07-31 ENCOUNTER — APPOINTMENT (INPATIENT)
Dept: RADIOLOGY | Facility: HOSPITAL | Age: 69
DRG: 683 | End: 2019-07-31
Payer: MEDICARE

## 2019-07-31 PROBLEM — D64.9 ANEMIA: Status: ACTIVE | Noted: 2019-07-31

## 2019-07-31 LAB
ANION GAP SERPL CALCULATED.3IONS-SCNC: 8 MMOL/L (ref 4–13)
BUN SERPL-MCNC: 46 MG/DL (ref 5–25)
CALCIUM SERPL-MCNC: 7.6 MG/DL (ref 8.3–10.1)
CHLORIDE SERPL-SCNC: 109 MMOL/L (ref 100–108)
CO2 SERPL-SCNC: 25 MMOL/L (ref 21–32)
CREAT SERPL-MCNC: 1.93 MG/DL (ref 0.6–1.3)
FERRITIN SERPL-MCNC: 124 NG/ML (ref 8–388)
FOLATE SERPL-MCNC: 11.7 NG/ML (ref 3.1–17.5)
GFR SERPL CREATININE-BSD FRML MDRD: 26 ML/MIN/1.73SQ M
GLUCOSE SERPL-MCNC: 161 MG/DL (ref 65–140)
GLUCOSE SERPL-MCNC: 170 MG/DL (ref 65–140)
GLUCOSE SERPL-MCNC: 271 MG/DL (ref 65–140)
GLUCOSE SERPL-MCNC: 313 MG/DL (ref 65–140)
IRON SATN MFR SERPL: 33 %
IRON SERPL-MCNC: 73 UG/DL (ref 50–170)
MAGNESIUM SERPL-MCNC: 1.6 MG/DL (ref 1.6–2.6)
MRSA NOSE QL CULT: NORMAL
PHOSPHATE SERPL-MCNC: 4.2 MG/DL (ref 2.3–4.1)
POTASSIUM SERPL-SCNC: 5 MMOL/L (ref 3.5–5.3)
SODIUM SERPL-SCNC: 142 MMOL/L (ref 136–145)
TIBC SERPL-MCNC: 221 UG/DL (ref 250–450)
VIT B12 SERPL-MCNC: 206 PG/ML (ref 100–900)

## 2019-07-31 PROCEDURE — 93018 CV STRESS TEST I&R ONLY: CPT | Performed by: INTERNAL MEDICINE

## 2019-07-31 PROCEDURE — 82728 ASSAY OF FERRITIN: CPT | Performed by: INTERNAL MEDICINE

## 2019-07-31 PROCEDURE — 83550 IRON BINDING TEST: CPT | Performed by: INTERNAL MEDICINE

## 2019-07-31 PROCEDURE — 99232 SBSQ HOSP IP/OBS MODERATE 35: CPT | Performed by: INTERNAL MEDICINE

## 2019-07-31 PROCEDURE — 82948 REAGENT STRIP/BLOOD GLUCOSE: CPT

## 2019-07-31 PROCEDURE — 71046 X-RAY EXAM CHEST 2 VIEWS: CPT

## 2019-07-31 PROCEDURE — 93660 TILT TABLE EVALUATION: CPT

## 2019-07-31 PROCEDURE — 84100 ASSAY OF PHOSPHORUS: CPT | Performed by: INTERNAL MEDICINE

## 2019-07-31 PROCEDURE — 78452 HT MUSCLE IMAGE SPECT MULT: CPT

## 2019-07-31 PROCEDURE — 78452 HT MUSCLE IMAGE SPECT MULT: CPT | Performed by: INTERNAL MEDICINE

## 2019-07-31 PROCEDURE — 83735 ASSAY OF MAGNESIUM: CPT | Performed by: INTERNAL MEDICINE

## 2019-07-31 PROCEDURE — 83540 ASSAY OF IRON: CPT | Performed by: INTERNAL MEDICINE

## 2019-07-31 PROCEDURE — 93017 CV STRESS TEST TRACING ONLY: CPT

## 2019-07-31 PROCEDURE — 82746 ASSAY OF FOLIC ACID SERUM: CPT | Performed by: INTERNAL MEDICINE

## 2019-07-31 PROCEDURE — 82607 VITAMIN B-12: CPT | Performed by: INTERNAL MEDICINE

## 2019-07-31 PROCEDURE — A9502 TC99M TETROFOSMIN: HCPCS

## 2019-07-31 PROCEDURE — 76770 US EXAM ABDO BACK WALL COMP: CPT

## 2019-07-31 PROCEDURE — 80048 BASIC METABOLIC PNL TOTAL CA: CPT | Performed by: INTERNAL MEDICINE

## 2019-07-31 PROCEDURE — 99223 1ST HOSP IP/OBS HIGH 75: CPT | Performed by: INTERNAL MEDICINE

## 2019-07-31 PROCEDURE — 93016 CV STRESS TEST SUPVJ ONLY: CPT | Performed by: INTERNAL MEDICINE

## 2019-07-31 RX ORDER — MAGNESIUM SULFATE HEPTAHYDRATE 40 MG/ML
2 INJECTION, SOLUTION INTRAVENOUS ONCE
Status: COMPLETED | OUTPATIENT
Start: 2019-07-31 | End: 2019-07-31

## 2019-07-31 RX ADMIN — INSULIN LISPRO 4 UNITS: 100 INJECTION, SOLUTION INTRAVENOUS; SUBCUTANEOUS at 15:09

## 2019-07-31 RX ADMIN — FAMOTIDINE 20 MG: 20 TABLET ORAL at 08:45

## 2019-07-31 RX ADMIN — SODIUM CHLORIDE 75 ML/HR: 0.9 INJECTION, SOLUTION INTRAVENOUS at 05:36

## 2019-07-31 RX ADMIN — PREGABALIN 100 MG: 100 CAPSULE ORAL at 17:45

## 2019-07-31 RX ADMIN — INSULIN GLARGINE 30 UNITS: 100 INJECTION, SOLUTION SUBCUTANEOUS at 21:59

## 2019-07-31 RX ADMIN — MONTELUKAST SODIUM 10 MG: 10 TABLET, FILM COATED ORAL at 21:59

## 2019-07-31 RX ADMIN — Medication 250 MG: at 08:45

## 2019-07-31 RX ADMIN — Medication 250 MG: at 17:45

## 2019-07-31 RX ADMIN — REGADENOSON 0.4 MG: 0.08 INJECTION, SOLUTION INTRAVENOUS at 12:23

## 2019-07-31 RX ADMIN — METOPROLOL TARTRATE 75 MG: 50 TABLET, FILM COATED ORAL at 21:59

## 2019-07-31 RX ADMIN — LORATADINE 10 MG: 10 TABLET ORAL at 08:43

## 2019-07-31 RX ADMIN — MAGNESIUM SULFATE HEPTAHYDRATE 2 G: 40 INJECTION, SOLUTION INTRAVENOUS at 16:12

## 2019-07-31 RX ADMIN — INSULIN GLARGINE 30 UNITS: 100 INJECTION, SOLUTION SUBCUTANEOUS at 08:47

## 2019-07-31 RX ADMIN — APIXABAN 5 MG: 5 TABLET, FILM COATED ORAL at 17:45

## 2019-07-31 RX ADMIN — APIXABAN 5 MG: 5 TABLET, FILM COATED ORAL at 08:42

## 2019-07-31 RX ADMIN — INSULIN LISPRO 5 UNITS: 100 INJECTION, SOLUTION INTRAVENOUS; SUBCUTANEOUS at 17:45

## 2019-07-31 RX ADMIN — INSULIN LISPRO 1 UNITS: 100 INJECTION, SOLUTION INTRAVENOUS; SUBCUTANEOUS at 08:45

## 2019-07-31 RX ADMIN — PREGABALIN 100 MG: 100 CAPSULE ORAL at 08:54

## 2019-07-31 NOTE — SOCIAL WORK
Per chart review, nurse and provider rounds pt is A&Ox4, independently ambulating, progressing and is a tentative discharge for tomorrow  No discharge needs identified at this time

## 2019-07-31 NOTE — ASSESSMENT & PLAN NOTE
Baseline creatinine 1 2-1 42  On admission creatinine 3 5   Possibilities includes pre renal setting of diuretic and ACE inhibitor use  Recent hospitalization with a KI in setting of gastroenteritis with subsequently improved  Patient reports last blood work around 2 weeks ago with PMD, was told that her kidney function was fine  Denied any changes in medications recently except addition of ranitidine  UA bland    CT scan of the abdomen and pelvis last month without any evidence of hydronephrosis  Seen and followed by Nephrology during hospitalization  · held lisinopril and torsemide during hospitalization  · PVR, 0  · Orthostasis remains negative at presentation, monitor blood pressure  · Hold nephrotoxic meds  Improved with IV hydration down to baseline 1 48  Will continue to hold lisinopril at the time of discharge secondary to recent SEBASTIAN and borderline hyperkalemia  Continue to hold metformin  Torsemide will be resumed in 2 days  Follow-up BMP in 1 week and follow up with Nephrology after discharge

## 2019-07-31 NOTE — PROGRESS NOTES
Progress Note - Cardiology   [facility]     Rogelio Schaeffer 81 y o  female MRN: [id number]  : 1950  Unit/Bed#: [address] [place] [address] Encounter: [id number]    Assessment and Plan:   1  Fall with question of syncope:  No further episodes since admission  Patient's renal function improving  Syncope most likely due to orthostasis secondary to dehydration  Tilt-table testing was negative  Nuclear stress test pending  2  Acute renal failure on chronic kidney disease stage 3:  Baseline creatinine is 1 3-1 4  Today's creatinine is 1 9  Slowly improving with IV hydration  Nephrology consultation appreciated  Continue to hold patient's diuretics and ACE-inhibitor  3  Diabetes with hemoglobin A1c of 8 4:  Managed per primary team     4  SVT/PAF:  Heart rate improved with decreased dose of beta-blocker  No ectopy noted  5  Obesity    Subjective / Objective:   Patient seen and examined  Tilt test this morning was performed and was negative  Patient also scheduled for Lexiscan nuclear stress test which is currently pending  No further complaints of dizziness or lightheadedness  Renal function slowly improving with IV fluids  Vitals: Blood pressure 151/67, pulse 64, temperature 97 7 °F (36 5 °C), temperature source Oral, resp  rate 18, height 5' 2" (1 575 m), weight 115 kg (252 lb 13 9 oz), SpO2 100 %, not currently breastfeeding  Vitals:    19 1934 19 0600   Weight: 114 kg (251 lb 11 2 oz) 115 kg (252 lb 13 9 oz)     Body mass index is 46 25 kg/m²  BP Readings from Last 3 Encounters:   19 151/67   19 123/66   19 118/60     Orthostatic Blood Pressures      Most Recent Value   Blood Pressure  151/67 filed at 2019 0736   Patient Position - Orthostatic VS  Sitting filed at 2019 0736        I/O       701 -  07 -  07 -  0700    P  O   3040     I V  (mL/kg) 990 (8 7) 597 5 (5 2)     IV Piggyback 1000      Total Intake(mL/kg) 1990 (17 5) 3637 5 (31 6)     Urine (mL/kg/hr)  1150 (0 4) 600 (1 2)    Total Output  1150 600    Net +1990 +2487 5 -600               Invasive Devices     Peripheral Intravenous Line            Peripheral IV 07/30/19 Right Forearm 1 day                  Intake/Output Summary (Last 24 hours) at 7/31/2019 1116  Last data filed at 7/31/2019 0901  Gross per 24 hour   Intake 3317 5 ml   Output 1550 ml   Net 1767 5 ml         Physical Exam:   Physical Exam   Constitutional: She is oriented to person, place, and time  She appears well-developed and well-nourished  No distress  HENT:   Head: Normocephalic and atraumatic  Right Ear: External ear normal    Left Ear: External ear normal    Eyes: Pupils are equal, round, and reactive to light  Conjunctivae are normal  Right eye exhibits no discharge  Left eye exhibits no discharge  No scleral icterus  Neck: Normal range of motion  Neck supple  No JVD present  No thyromegaly present  Cardiovascular: Normal rate, regular rhythm, normal heart sounds and intact distal pulses  Pulmonary/Chest: Effort normal and breath sounds normal  No respiratory distress  She has no wheezes  She has no rales  Abdominal: Soft  Bowel sounds are normal  She exhibits no distension  Musculoskeletal: She exhibits no edema  Neurological: She is alert and oriented to person, place, and time  Skin: Skin is warm and dry  Capillary refill takes less than 2 seconds  She is not diaphoretic  Psychiatric: She has a normal mood and affect  Nursing note and vitals reviewed              Medications/ Allergies:     Current Facility-Administered Medications:  acetaminophen 650 mg Oral Q6H PRN Hetal Maki MD    albuterol 2 puff Inhalation Q6H PRN Hetal Maki MD    apixaban 5 mg Oral BID Hetal Maki MD    famotidine 20 mg Oral Daily Hetal Maki MD    insulin glargine 30 Units Subcutaneous Q12H 3630 Corey Veras MD    insulin lispro 1-6 Units Subcutaneous TID SHANNON Mcelroy Susan Menard MD    loratadine 10 mg Oral Daily April Dorado MD    metoprolol tartrate 75 mg Oral Q12H [address] Alexi Bennett MD    montelukast 10 mg Oral HS April Dorado MD    ondansetron 4 mg Intravenous Q6H PRN April Dorado MD    pregabalin 100 mg Oral BID April Dorado MD    saccharomyces boulardii 250 mg Oral BID April Dorado MD    sodium chloride 75 mL/hr Intravenous Continuous Promise Armenta MD Last Rate: 75 mL/hr (07/31/19 0536)       acetaminophen 650 mg Q6H PRN   albuterol 2 puff Q6H PRN   ondansetron 4 mg Q6H PRN     Allergies   Allergen Reactions    Asa [Aspirin] GI Intolerance    Indocin [Indomethacin] Other (See Comments)     Made patient "loopy"    Penicillins Hives    Percocet [Oxycodone-Acetaminophen]        VTE Pharmacologic Prophylaxis:   Sequential compression device (Venodyne)     Labs:   Troponins:  Results from last 7 days   Lab Units 07/30/19  0603 07/29/19  1519   CK TOTAL U/L 77  --    TROPONIN I ng/mL  --  <0 02     CBC with diff:  Results from last 7 days   Lab Units 07/30/19  0603 07/29/19  1519   WBC Thousand/uL 7 20 8 75   HEMOGLOBIN g/dL 10 7* 10 9*   HEMATOCRIT % 33 6* 34 7*   MCV fL 94 92   PLATELETS Thousands/uL 159 171   MCH pg 29 8 29 0   MCHC g/dL 31 8 31 4   RDW % 13 3 13 5   MPV fL 11 1 10 4   NRBC AUTO /100 WBCs  --  0     CMP:  Results from last 7 days   Lab Units 07/31/19  0514 07/30/19  0603 07/29/19  1519   SODIUM mmol/L 142 139 136   POTASSIUM mmol/L 5 0 4 5 5 1   CHLORIDE mmol/L 109* 107 102   CO2 mmol/L 25 23 25   ANION GAP mmol/L 8 9 9   BUN mg/dL 46* 59* 65*   CREATININE mg/dL 1 93* 2 61* 3 53*   CALCIUM mg/dL 7 6* 7 5* 7 8*   AST U/L  --   --  12   ALT U/L  --   --  27   ALK PHOS U/L  --   --  47   TOTAL PROTEIN g/dL  --   --  6 7   ALBUMIN g/dL  --   --  3 5   TOTAL BILIRUBIN mg/dL  --   --  0 40   EGFR ml/min/1 73sq m 26 18 13     Magnesium:  Results from last 7 days   Lab Units 07/31/19  0514 07/30/19  0603   MAGNESIUM mg/dL 1 6 1 6     TSH:  Results from last 7 days Lab Units 07/30/19  0603   TSH 3RD GENERATON uIU/mL 2 017       Imaging & Testing   I have personally reviewed pertinent reports  Xr Shoulder 2+ Views Left    Result Date: 7/30/2019  Narrative: LEFT SHOULDER INDICATION:   fall  COMPARISON:  None VIEWS:  XR SHOULDER 2+ VW LEFT Images: 3 FINDINGS: There is no acute fracture or dislocation  No significant degenerative changes  No lytic or blastic lesions are seen  Soft tissues are unremarkable  Impression: No acute osseous abnormality  Workstation performed: [id number]     Xr Knee 4+ Views Left Injury    Result Date: 7/30/2019  Narrative: LEFT KNEE INDICATION:   fall  COMPARISON:  05/03/2016 VIEWS:  XR KNEE 4+ VW LEFT INJURY Images: 4 FINDINGS: There is no acute fracture or dislocation  There is no joint effusion  There is patellar spurring present  There is moderate narrowing of the medial joint compartment  There is pointing of the tibial spines  No lytic or blastic lesions are seen  Soft tissues are unremarkable  Impression: No acute osseous abnormality  Moderate degenerative changes in the left knee  Workstation performed: [id number]     Ct Head Without Contrast    Result Date: 7/29/2019  Narrative: CT BRAIN - WITHOUT CONTRAST INDICATION:   fall/LOC  COMPARISON:  Head CT 5/25/2019 TECHNIQUE:  CT examination of the brain was performed  In addition to axial images, coronal 2D reformatted images were created and submitted for interpretation  Radiation dose length product (DLP) for this visit:  1050 9 mGy-cm   This examination, like all CT scans performed in the [facility], was performed utilizing techniques to minimize radiation dose exposure, including the use of iterative  reconstruction and automated exposure control  IMAGE QUALITY:  Diagnostic  FINDINGS: PARENCHYMA: No acute intracranial hemorrhage  No masslike lesion, mass effect effect or midline shift  No CT evidence of acute infarct   Decreased attenuation is noted in periventricular and subcortical white matter, which is nonspecific but most likely  to represent mild microangiopathic change  VENTRICLES AND EXTRA-AXIAL SPACES:  Normal for the patient's age  VISUALIZED ORBITS AND PARANASAL SINUSES:  The orbits are unremarkable  Polypoid mucosal thickening/retention cyst in the left locule of the sphenoid sinus CALVARIUM AND EXTRACRANIAL SOFT TISSUES:  Normal      Impression: No acute intracranial CT abnormality  Workstation performed: [id number]        EKG / Monitor: Personally reviewed  Sinus rhythm    Cardiac testing:   Results for orders placed during the hospital encounter of 19   Echo complete with contrast if indicated    Narrative [address]  [address] [facility]  [phone]    Transthoracic Echocardiogram  2D, M-mode, Doppler, and Color Doppler    Study date:  29-May-2019    Patient: Lucio Woods  MR number: [id number]  Account number: [de-identified]  : 1950  Age: 79 years  Gender: Female  Status: Inpatient  Location: Bedside  Height: 62 in  Weight: 250 6 lb  BP: 133/ 62 mmHg    Indications: Tachycardia    Diagnoses: I11 9 - Hypertensive heart disease without heart failure    Sonographer:  Collette Ripple, RCS  Referring Physician:  Trina Rodriguez MD  Group:  [facility]  Interpreting Physician:  Lucas Perea DO    SUMMARY    LEFT VENTRICLE:  Systolic function was normal by visual assessment  Ejection fraction was estimated to be 55 %  There were no regional wall motion abnormalities  Wall thickness was mildly to moderately increased  Doppler parameters were consistent with abnormal left ventricular relaxation (grade 1 diastolic dysfunction)  MITRAL VALVE:  There was mild regurgitation  AORTIC VALVE:  There was no evidence for stenosis  There was no regurgitation  TRICUSPID VALVE:  There was mild regurgitation    Pulmonary artery systolic pressure was within the normal range     HISTORY: PRIOR HISTORY: Diabetes,Diabetes, HTN, Hyperlipidemia, A Fib  PROCEDURE: The procedure was performed at the bedside  This was a routine study  The transthoracic approach was used  The study included complete 2D imaging, M-mode, complete spectral Doppler, and color Doppler  The heart rate was 77 bpm,  at the start of the study  Images were obtained from the parasternal, apical, subcostal, and suprasternal notch acoustic windows  Echocardiographic views were limited due to restricted patient mobility, poor patient compliance, poor  acoustic window availability, decreased penetration, and lung interference  This was a technically difficult study  LEFT VENTRICLE: Size was normal  Systolic function was normal by visual assessment  Ejection fraction was estimated to be 55 %  There were no regional wall motion abnormalities  Wall thickness was mildly to moderately increased  DOPPLER:  Doppler parameters were consistent with abnormal left ventricular relaxation (grade 1 diastolic dysfunction)  RIGHT VENTRICLE: The size was normal  Systolic function was normal     LEFT ATRIUM: The atrium was mildly dilated  RIGHT ATRIUM: Size was normal     MITRAL VALVE: Valve structure was normal  There was normal leaflet separation  No echocardiographic evidence for prolapse  DOPPLER: The transmitral velocity was within the normal range  There was no evidence for stenosis  There was mild  regurgitation  AORTIC VALVE: The valve was trileaflet  Leaflets exhibited normal thickness, normal cuspal separation, and sclerosis  DOPPLER: Transaortic velocity was within the normal range  There was no evidence for stenosis  There was no  regurgitation  TRICUSPID VALVE: The valve structure was normal  There was normal leaflet separation  DOPPLER: The transtricuspid velocity was within the normal range  There was mild regurgitation  Pulmonary artery systolic pressure was within the normal  range   Estimated peak PA pressure was 32 mmHg  PULMONIC VALVE: Leaflets exhibited normal thickness, no calcification, and normal cuspal separation  DOPPLER: The transpulmonic velocity was within the normal range  There was no regurgitation  PERICARDIUM: There was no thickening  There was no pericardial effusion  AORTA: The root exhibited normal size  PULMONARY ARTERY: The size was normal  The morphology appeared normal     SYSTEM MEASUREMENT TABLES    2D mode  AoR Diam 2D: 3 6 cm  LA Diam (2D): 3 9 cm  LA/Ao (2D): 1 08  FS (2D Teich): 26 9 %  IVSd (2D): 1 29 cm  LVDEV: 75 1 cmï¾³  LVESV: 35 3 cmï¾³  LVIDd(2D): 4 12 cm  LVISd (2D): 3 01 cm  LVOT Area 2D: 3 14 cmï¾²  LVPWd (2D): 1 2 cm  SV (Teich): 39 8 cmï¾³    Apical four chamber  LVEF A4C: 51 %    Unspecified Scan Mode  HANNA Cont Eq (Peak Gutierrez): 2 58 cmï¾²  LVOT Diam : 2 cm  LVOT Vmax: 963 mm/s  LVOT Vmax; Mean: 963 mm/s  Peak Grad ; Mean: 4 mm[Hg]  MV Peak A Gutierrez: 893 mm/s  MV Peak E Gutierrez  Mean: 805 mm/s  MVA (PHT): 3 67 cmï¾²  PHT: 60 ms  Max P mm[Hg]  V Max: 2360 mm/s  Vmax: 2430 mm/s  RA Area: 12 8 cmï¾²  RA Volume: 27 6 cmï¾³  TAPSE: 2 cm    [employer] Accredited Echocardiography Laboratory    Prepared and electronically signed by    Cameron Wheeler DO  Signed 29-May-2019 16:19:07           Kerry Clemons        "This note has been constructed using a voice recognition system  Therefore there may be syntax, spelling, and/or grammatical errors   Please call if you have any questions  "

## 2019-07-31 NOTE — ASSESSMENT & PLAN NOTE
Last echo from 5/2019 showed EF 55% with GIDD  Patient with mild bradycardia, in NSR   EKG noted  Monitor with decreased dose of metoprolol  Continue eliquis  Cardiology input appreciated  Follow up with Cardiology after discharge

## 2019-07-31 NOTE — PROGRESS NOTES
[facility] Progress Note  Patient: Alex Goldman 81 y o  female   MRN: [id number]  PCP: Jak Anders  Unit/Bed#: [address] [place] [address] Encounter: [id number]  Date Of Visit: 07/31/19    Problem List:    Principal Problem:    Syncope and collapse  Active Problems:    Acute renal failure superimposed on stage 3 chronic kidney disease ([place] [address] )    Uncontrolled diabetes mellitus type 2 without complications (HCC)    Paroxysmal atrial fibrillation ([place] [address] )    Mixed hyperlipidemia    Asthma    Morbid obesity with BMI of 45 0-49 9, adult ([place] [address] )    Anemia      Assessment & Plan:    Acute renal failure superimposed on stage 3 chronic kidney disease ([place] [address] )  Assessment & Plan  Baseline creatinine 1 2-1 42  On admission creatinine 3 5, unclear etiology   Possibilities includes pre renal setting of diuretic and ACE inhibitor use  Recent hospitalization with a KI in setting of gastroenteritis with subsequently improved  Patient reports last blood work around 2 weeks ago with PMD, was told that her kidney function was fine  Denied any changes in medications recently except addition of ranitidine  UA bland  CT scan of the abdomen and pelvis last month without any evidence of hydronephrosis  · Improving with IV fluids  · Continue to hold torsemide and lisinopril  · PVR, 0  · Orthostasis remains negative at presentation, monitor blood pressure  · Hold nephrotoxic meds  · Nephrology evaluation appreciated, recommended to continue IV fluid  Follow-up renal ultrasound    * Syncope and collapse  Assessment & Plan  Patient presented with syncopal episode from standing position  Most probably secondary to dehydration next  Noted to have acute kidney injury  though Orthostasis negative in ED  Has hx of PAF and SVT  Denied CP, palpitations     EKG showed NSR, telemetry-braycardia - improved after decreasing metoprolol  CT head with no acute intracranial findings  Possibly related to dehydration plus/minus bradycardia  · Tele monitor without significant arrhythmias  Mild bradycardia improved after decreasing metoprolol  · Neuro checks-stable  · Continue to hold lisinopril and torsemide  · Fall precautions, x-ray of the left knee and left shoulder without any acute abnormality  · Continue IV fluid  · Cardiology evaluation appreciated, recommended tilt-table test which was negative  · Underwent nuclear stress test today, follow-up results      Paroxysmal atrial fibrillation [facility])  Assessment & Plan  Last echo from 5/2019 showed EF 55% with GIDD  Patient with mild bradycardia, in NSR  EKG noted  Monitor with decreased dose of metoprolol  Continue eliquis  Follow up Cardiology recommendation    Uncontrolled diabetes mellitus type 2 without complications [facility])  Assessment & Plan  Lab Results   Component Value Date    HGBA1C 8 4 (H) 03/09/2018       Recent Labs     07/30/19  2057 07/31/19  0719 07/31/19  1118 07/31/19  1604   POCGLU 261* 161* 271* 313*       Blood Sugar Average: Last 72 hrs:  (P) 216 [id number]   Recheck hemoglobin A1c  Continue home lantus, place on ISS  Hold metfomin  Diabetic diet    Mixed hyperlipidemia  Assessment & Plan  Hold statin given SEBASTIAN    Anemia  Assessment & Plan  No overt bleeding  Normocytic  Iron sat 33%, folate within normal limit  Vitamin B12 level low at 20 6, will replete  Remains stable, Monitor    Morbid obesity with BMI of 45 0-49 9, adult (HCC)  Assessment & Plan  Advised weight loss, lifestyle changes    Asthma  Assessment & Plan  No evidence of exacerbation  Continue home meds        VTE Pharmacologic Prophylaxis:   Pharmacologic: Apixaban (Eliquis)  Mechanical VTE Prophylaxis in Place: Yes    Patient Centered Rounds: I have performed bedside rounds with nursing staff today  Discussions with Specialists or Other Care Team Provider:  Dr Uday Tan, Dr Mar Nair    Education and Discussions with Family / Patient:  Yes    Time Spent for Care: 45 minutes    More than 50% of total time spent on counseling and coordination of care as described above  Current Length of Stay: 2 day(s)    Current Patient Status: Inpatient   Certification Statement: The patient will continue to require additional inpatient hospital stay due to Acute kidney injury    Discharge Plan:  Home when clinically    Code Status: Level 1 - Full Code      Subjective:   Continues to improve  Denies any dizziness, palpitation  Denies any chest pain or shortness of breath  Denies any urinary complaint  Tolerating diet well      Objective:     Vitals:   Temp (24hrs), Av 9 °F (36 6 °C), Min:97 7 °F (36 5 °C), Max:98 3 °F (36 8 °C)    Temp:  [97 7 °F (36 5 °C)-98 3 °F (36 8 °C)] 97 7 °F (36 5 °C)  HR:  [62-72] 64  Resp:  [18-20] 18  BP: (120-151)/(56-67) 151/67  SpO2:  [96 %-100 %] 100 %  Body mass index is 46 25 kg/m²  Input and Output Summary (last 24 hours): Intake/Output Summary (Last 24 hours) at 2019 1102  Last data filed at 2019 0901  Gross per 24 hour   Intake 3317 5 ml   Output 1550 ml   Net 1767 5 ml       Physical Exam:     Physical Exam   Constitutional: She is oriented to person, place, and time  She appears well-developed  No distress  Morbidly obese   HENT:   Head: Normocephalic and atraumatic  Nose: Nose normal    Eyes: Pupils are equal, round, and reactive to light  Conjunctivae are normal    Neck: Normal range of motion  Neck supple  Cardiovascular: Normal rate, regular rhythm and normal heart sounds  Pulmonary/Chest: Effort normal and breath sounds normal  No respiratory distress  She has no wheezes  She has no rales  Diminished   Abdominal: Soft  Bowel sounds are normal  She exhibits no distension  There is no tenderness  There is no rebound and no guarding  Musculoskeletal: She exhibits no edema  Neurological: She is alert and oriented to person, place, and time  No cranial nerve deficit  Skin: Skin is warm and dry  No rash noted  Psychiatric: She has a normal mood and affect  Additional Data:     Labs:    Results from last 7 days   Lab Units 07/30/19  0603 07/29/19  1519   WBC Thousand/uL 7 20 8 75   HEMOGLOBIN g/dL 10 7* 10 9*   HEMATOCRIT % 33 6* 34 7*   PLATELETS Thousands/uL 159 171   NEUTROS PCT %  --  54   LYMPHS PCT %  --  32   MONOS PCT %  --  9   EOS PCT %  --  4     Results from last 7 days   Lab Units 07/31/19  0514  07/29/19  1519   POTASSIUM mmol/L 5 0   < > 5 1   CHLORIDE mmol/L 109*   < > 102   CO2 mmol/L 25   < > 25   BUN mg/dL 46*   < > 65*   CREATININE mg/dL 1 93*   < > 3 53*   CALCIUM mg/dL 7 6*   < > 7 8*   ALK PHOS U/L  --   --  47   ALT U/L  --   --  27   AST U/L  --   --  12    < > = values in this interval not displayed  * I Have Reviewed All Lab Data Listed Above  * Additional Pertinent Lab Tests Reviewed: All Labs Within Last 24 Hours Reviewed      Imaging:  Imaging Reports Reviewed Today Include:  CT scan, x-ray      Recent Cultures (last 7 days):           Last 24 Hours Medication List:     Current Facility-Administered Medications:  acetaminophen 650 mg Oral Q6H PRN Sandee Thompson MD    albuterol 2 puff Inhalation Q6H PRN Sandee Thompson MD    apixaban 5 mg Oral BID Sandee Thompson MD    famotidine 20 mg Oral Daily Sandee Thompson MD    insulin glargine 30 Units Subcutaneous Q12H 3630 Corey Veras MD    insulin lispro 1-6 Units Subcutaneous TID  Sandee Thompson MD    loratadine 10 mg Oral Daily Sandee Thompson MD    metoprolol tartrate 75 mg Oral Q12H [facility] Tanya Hernandez MD    montelukast 10 mg Oral HS Sandee Thompson MD    ondansetron 4 mg Intravenous Q6H PRN Sandee Thompson MD    pregabalin 100 mg Oral BID Sandee Thompson MD    saccharomyces boulardii 250 mg Oral BID Sandee Thompson MD    sodium chloride 75 mL/hr Intravenous Continuous Sekou Cronin MD Last Rate: 75 mL/hr (07/31/19 0536)          Today, Patient Was Seen By: Sekou Cronin MD    ** Please Note: "This note has been constructed using a voice recognition system  Therefore there may be syntax, spelling, and/or grammatical errors   Please call if you have any questions  "**

## 2019-07-31 NOTE — ASSESSMENT & PLAN NOTE
Lab Results   Component Value Date    HGBA1C 8 5 (H) 08/01/2019       Recent Labs     07/31/19  1118 07/31/19  1604 08/01/19  0704 08/01/19  1108   POCGLU 271* 313* 182* 275*       Blood Sugar Average: Last 72 hrs:  (P) 224 [id number]   Repeat hemoglobin A1c 8 5  Continue Lantus at home dose  continue to hold metformin at present  Patient was previously on trulicity which was discontinue recently by PMD  Discussed with patient regarding different alternatives and side effect profile    Will try Januvia  Advised to monitor blood sugar and symptoms  Follow up with PMD after discharge  Diabetic diet

## 2019-07-31 NOTE — ASSESSMENT & PLAN NOTE
No overt bleeding  Normocytic  Iron sat 33%, folate within normal limit  Vitamin B12 level low at 20 6, will replete  Remains stable, Monitor

## 2019-07-31 NOTE — CONSULTS
Consultation - Nephrology   Shanae Ward 81 y o  female MRN: [id number]  Unit/Bed#: [address] [place] [address] Encounter: [id number]    Inpatient consult to Nephrology  Consult performed by: Samra Trammell MD  Consult ordered by: Latesha Nguyen MD          History of Present Illness   Physician Requesting Consult: Latesha Nguyen MD  Reason for Consult / Principal Problem:  CKD stage 3/AK I     HPI: Shanae Ward is a 81y o  year old female with history type 2 diabetes mellitus/hypertension/dyslipidemia/PAF/morbid obesity who presents with a syncopal episode and SEBASTIAN  Apparently the patient was preparing for a elective colonoscopy with drinking just clear liquids not taking much fluid in, she did continue her diuretic and lisinopril  Apparently she was leaning over to take care of 1 of her grand nieces and apparently had loss of consciousness  She denies any premonition, dizziness or lightheadedness or headache change in vision or localized weakness or numbness or tingling  She denies any chest pain or shortness of Breath or worsening leg swelling  We are asked to see her for AK I on top of chronic kidney disease:  · Baseline creatinine ranges anywhere from 1 2-1 42  · Recent episode of cellulitis of her right knee associated with SEBASTIAN peak creatinine 2 14 down to 1 32 on discharge at 06/27/2019  · Creatinine on 07/29/2019 was 3 53 repeated 2 61 the following day yesterday  She denies any urinary symptoms including dysuria hematuria voiding symptoms or foamy urine  She denies any NSAID use  She denies any bladder problems  Remote kidney stone history of few years ago        History obtained from the patient, the old records and chart which I completely reviewed        General:  No fevers chills or illnesses  Cardiovascular:  No chest pain or shortness of breath and leg swelling is doing well  Respiratory:  No productive cough, post nasal drip, no shortness of breath  Gastrointestinal:  No nausea vomiting or recent diarrhea related to the prep for the colonoscopy; no abdominal pain  Genitourinary:  See HPI  Neuro:  No headaches, dizziness lightheadedness or numbness or tingling  Rest of review of systems as completely reviewed with the patient are negative    Historical Information   Past Medical History:   Diagnosis Date    A-fib ([facility] )     Asthma     CKD (chronic kidney disease)     Diabetes mellitus ([facility] )     GERD (gastroesophageal reflux disease)     Hyperlipidemia     Hypertension     Kidney stones     PONV (postoperative nausea and vomiting)     SVT (supraventricular tachycardia) ([facility])      Past Surgical History:   Procedure Laterality Date    APPENDECTOMY      CYSTOSCOPY W/ LASER LITHOTRIPSY Left 2016    Procedure: CYSTOSCOPY URETEROSCOPY WITH LITHOTRIPSY HOLMIUM LASER, RETROGRADE PYELOGRAM AND INSERTION STENT URETERAL;  Surgeon: William Pritchett MD;  [facility];  Service:    Malena Hardy DILATION AND CURETTAGE OF UTERUS      JOINT REPLACEMENT      right knee    KNEE ARTHROPLASTY Right     [place] CYSTOURETHROSCOPY,URETER CATHETER Left 2016    Procedure: CYSTOSCOPY RETROGRADE PYELOGRAM WITH INSERTION STENT URETERAL, left;  Surgeon: William Pritchett MD;  Location: [facility];  Service: Urology    SHOULDER ARTHROTOMY Left      Social History   Social History     Substance and Sexual Activity   Alcohol Use Not Currently    Frequency: Monthly or less    Drinks per session: 1 or 2    Binge frequency: Never    Comment: rarely     Social History     Substance and Sexual Activity   Drug Use No     Social History     Tobacco Use   Smoking Status Former Smoker    Packs/day:     Years: 20 00    Pack years: 20 00    Types: Cigarettes    Last attempt to quit: 1996    Years since quittin 0   Smokeless Tobacco Never Used     Family History   Problem Relation Age of Onset    Heart disease Mother     Emphysema Maternal Grandmother     Heart disease Family        Meds/Allergies   all current active meds have been reviewed, current meds:   Current Facility-Administered Medications   Medication Dose Route Frequency    acetaminophen (TYLENOL) tablet 650 mg  650 mg Oral Q6H PRN    albuterol (PROVENTIL HFA,VENTOLIN HFA) inhaler 2 puff  2 puff Inhalation Q6H PRN    apixaban (ELIQUIS) tablet 5 mg  5 mg Oral BID    famotidine (PEPCID) tablet 20 mg  20 mg Oral Daily    insulin glargine (LANTUS) subcutaneous injection 30 Units 0 3 mL  30 Units Subcutaneous Q12H [address]    insulin lispro (HumaLOG) 100 units/mL subcutaneous injection 1-6 Units  1-6 Units Subcutaneous TID AC    loratadine (CLARITIN) tablet 10 mg  10 mg Oral Daily    metoprolol tartrate (LOPRESSOR) tablet 75 mg  75 mg Oral Q12H SELMA    montelukast (SINGULAIR) tablet 10 mg  10 mg Oral HS    ondansetron (ZOFRAN) injection 4 mg  4 mg Intravenous Q6H PRN    pregabalin (LYRICA) capsule 100 mg  100 mg Oral BID    saccharomyces boulardii (FLORASTOR) capsule 250 mg  250 mg Oral BID    sodium chloride 0 9 % infusion  75 mL/hr Intravenous Continuous    and PTA meds:    Medications Prior to Admission   Medication    apixaban (ELIQUIS) 5 mg    Dulaglutide (TRULICITY) 1 5 EE/4 1VO SOPN    Insulin Glargine (TOUJEO SOLOSTAR) injection pen 300 units/mL    lisinopril (ZESTRIL) 10 mg tablet    metFORMIN (GLUCOPHAGE) 850 mg tablet    metoprolol tartrate (LOPRESSOR) 100 mg tablet    montelukast (SINGULAIR) 10 mg tablet    pregabalin (LYRICA) 100 mg capsule    ranitidine (ZANTAC) 150 mg tablet    rosuvastatin (CRESTOR) 20 MG tablet    torsemide (DEMADEX) 20 mg tablet    albuterol (PROVENTIL HFA,VENTOLIN HFA) 90 mcg/act inhaler    desloratadine (CLARINEX) 5 MG tablet    saccharomyces boulardii (FLORASTOR) 250 mg capsule       Allergies   Allergen Reactions    Asa [Aspirin] GI Intolerance    Indocin [Indomethacin] Other (See Comments)     Made patient "loopy"    Penicillins Hives    Percocet [Oxycodone-Acetaminophen]        Objective Intake/Output Summary (Last 24 hours) at 7/31/2019 0852  Last data filed at 7/31/2019 0601  Gross per 24 hour   Intake 3317 5 ml   Output 950 ml   Net 2367 5 ml     Patient Vitals for the past 24 hrs:   BP Temp Temp src Pulse Resp SpO2 Weight   07/31/19 0736 151/67 97 7 °F (36 5 °C) Oral 64 18 100 %    07/31/19 0600       115 kg (252 lb 13 9 oz)   07/31/19 0345 123/58 97 8 °F (36 6 °C) Oral 66 20 96 %    07/30/19 2105 135/63   67      07/30/19 2100 124/58 98 3 °F (36 8 °C) Oral 65 18 96 %    07/30/19 1357 120/56 97 9 °F (36 6 °C) Oral 62 18 99 %        Invasive Devices:      Vitals:    07/31/19 0736   BP: 151/67   Pulse: 64   Resp: 18   Temp: 97 7 °F (36 5 °C)   SpO2: 100%     General:  Morbidly obese, sitting in the chair no acute distress  Skin:  No acute rash  Eyes:  No scleral icterus and noninjected  ENT:  Normocephalic/atraumatic, mucous membranes moist  Neck:  Supple, no jugular venous distention, no carotid bruits, 1+ carotid upstroke, no thyromegaly  Back:  No CVA tenderness  Chest:  Very scant bibasilar crackles, good respiratory effort, no dullness to percussion, no use of accessory respiratory muscles  CVS:  Regular rate and rhythm without a murmur rub or gallops appreciable  Abdomen:  Obese, soft and nontender normal bowel sounds, no hepatosplenomegaly or bruits  Extremities:  No clubbing, no cyanosis, trace lower extremity/pedal edema at most, 1+ dorsalis pedis pulses, no femoral bruits  Neuro:  No gross focality  Psych:  Alert oriented and appropriate    Current Weight: Weight - Scale: 115 kg (252 lb 13 9 oz)  First Weight: Weight - Scale: 112 kg (246 lb 14 6 oz)    Lab Results:  I have personally reviewed pertinent labs    CBC:   Lab Results   Component Value Date    WBC 7 20 07/30/2019    RBC 3 59 (L) 07/30/2019     CMP:   Lab Results   Component Value Date     07/30/2019    CO2 23 07/30/2019    BUN 59 (H) 07/30/2019    CREATININE 2 61 (H) 07/30/2019    CALCIUM 7 5 (L) 07/30/2019    AST 12 07/29/2019    ALT 27 07/29/2019    ALKPHOS 47 07/29/2019    EGFR 18 07/30/2019     Phosphorus:   Lab Results   Component Value Date    PHOS 4 4 (H) 06/25/2019     Magnesium:   Lab Results   Component Value Date    MG 1 6 07/30/2019     Urinalysis:   Lab Results   Component Value Date    COLORU Light Yellow 07/29/2019    CLARITYU Slightly Cloudy 07/29/2019    SPECGRAV 1 020 07/29/2019    PHUR 5 0 07/29/2019    PHUR 5 0 03/10/2018    LEUKOCYTESUR Negative 07/29/2019    NITRITE Negative 07/29/2019    GLUCOSEU Negative 07/29/2019    KETONESU Negative 07/29/2019    BILIRUBINUR Negative 07/29/2019    BLOODU Negative 07/29/2019     BMP:   Lab Results   Component Value Date    SODIUM 139 07/30/2019    CO2 23 07/30/2019    BUN 59 (H) 07/30/2019    CREATININE 2 61 (H) 07/30/2019    CALCIUM 7 5 (L) 07/30/2019     Radiology review:        Assessment/Plan   1  CKD stage 3:  Compatible with diabetic nephropathy/hypertensive nephrosclerosis/arteriolar nephrosclerosis unlikely related to FSGS from obesity given lack of proteinuria   -baseline 1 2-1 42    2  AK I:  Differential diagnosis would include prerenal certainly with the colonic preparation not eating as well in addition to the diuretic, plus vasomotor effect from the ACE-inhibitor  Less likely but rule out obstructive uropathy  Doubt intrinsic disease with a bland urinalysis with no hematuria and no proteinuria  -UA:  Negative proteinuria hematuria or pyuria  Recommendations: Workup:  -renal ultrasound  -PVR with bladder scan  -recheck labs  Treatment:  -IV fluids  -hold diuretics  -hold ACE-inhibitor  -avoid nephrotoxic agents such as NSAIDs  -avoid hypoperfusion  -hold metformin  Further recommendations will be forthcoming depending upon the results and course  3  Hypertension:  Acceptable at this time  Avoid hypoperfusion in the setting of AK I   Course hold ACE-inhibitor and diuretics at this time   -check orthostatic blood pressures but apparently they have been unremarkable thus far    4  Electrolytes: All acceptable  5  Mineral bone disorder:  Recheck phosphorus  Magnesium is low yesterday  Check today  May need repletion  6  Anemia:  Possibly from chronic disease/CKD  Hemoglobin has been acceptable at 10 7  Will check iron studies  7  Other issues:  -atrial fibrillation  -diabetes mellitus type 2  -asthma  -dyslipidemia  -morbid obesity  -syncopal episode being evaluated by primary service  Portions of the record may have been created with voice recognition software   Occasional wrong word or "sound a like" substitutions may have occurred due to the inherent limitations of voice recognition software   Read the chart carefully and recognize, using context, where substitutions have occurred

## 2019-07-31 NOTE — ASSESSMENT & PLAN NOTE
Patient presented with syncopal episode from standing position  Most probably secondary to dehydration next  Noted to have acute kidney injury  though Orthostasis negative in ED  Has hx of PAF and SVT  Denied CP, palpitations  EKG showed NSR, telemetry-braycardia - improved after decreasing metoprolol  CT head with no acute intracranial findings  Possibly related to dehydration plus/minus bradycardia  · Tele monitor without significant arrhythmias  Mild bradycardia improved after decreasing metoprolol  · Neuro checks-stable  · Will continue metoprolol at lower dose  · x-ray of the left knee and left shoulder without any acute abnormality  · Cardiology evaluation appreciated, recommended tilt-table test which was negative  · Underwent nuclear stress test on 08/01, which was a normal study with mild likely artifactual defect    Normal EF  · Follow up with Cardiology after discharge

## 2019-08-01 VITALS
TEMPERATURE: 98.2 F | BODY MASS INDEX: 47.29 KG/M2 | RESPIRATION RATE: 18 BRPM | OXYGEN SATURATION: 97 % | SYSTOLIC BLOOD PRESSURE: 163 MMHG | WEIGHT: 257 LBS | DIASTOLIC BLOOD PRESSURE: 70 MMHG | HEIGHT: 62 IN | HEART RATE: 57 BPM

## 2019-08-01 PROBLEM — N17.9 ACUTE RENAL FAILURE SUPERIMPOSED ON STAGE 3 CHRONIC KIDNEY DISEASE (HCC): Status: RESOLVED | Noted: 2019-06-25 | Resolved: 2019-08-01

## 2019-08-01 PROBLEM — N18.30 ACUTE RENAL FAILURE SUPERIMPOSED ON STAGE 3 CHRONIC KIDNEY DISEASE (HCC): Status: RESOLVED | Noted: 2019-06-25 | Resolved: 2019-08-01

## 2019-08-01 PROBLEM — R55 SYNCOPE AND COLLAPSE: Status: RESOLVED | Noted: 2019-07-29 | Resolved: 2019-08-01

## 2019-08-01 PROBLEM — I10 ESSENTIAL HYPERTENSION: Status: ACTIVE | Noted: 2019-08-01

## 2019-08-01 LAB
ANION GAP SERPL CALCULATED.3IONS-SCNC: 7 MMOL/L (ref 4–13)
BASOPHILS # BLD AUTO: 0.06 THOUSANDS/ΜL (ref 0–0.1)
BASOPHILS NFR BLD AUTO: 1 % (ref 0–1)
BUN SERPL-MCNC: 33 MG/DL (ref 5–25)
CALCIUM SERPL-MCNC: 8.1 MG/DL (ref 8.3–10.1)
CHLORIDE SERPL-SCNC: 109 MMOL/L (ref 100–108)
CO2 SERPL-SCNC: 25 MMOL/L (ref 21–32)
CREAT SERPL-MCNC: 1.48 MG/DL (ref 0.6–1.3)
EOSINOPHIL # BLD AUTO: 0.41 THOUSAND/ΜL (ref 0–0.61)
EOSINOPHIL NFR BLD AUTO: 6 % (ref 0–6)
ERYTHROCYTE [DISTWIDTH] IN BLOOD BY AUTOMATED COUNT: 13.6 % (ref 11.6–15.1)
EST. AVERAGE GLUCOSE BLD GHB EST-MCNC: 197 MG/DL
GFR SERPL CREATININE-BSD FRML MDRD: 36 ML/MIN/1.73SQ M
GLUCOSE SERPL-MCNC: 182 MG/DL (ref 65–140)
GLUCOSE SERPL-MCNC: 197 MG/DL (ref 65–140)
GLUCOSE SERPL-MCNC: 275 MG/DL (ref 65–140)
HBA1C MFR BLD: 8.5 % (ref 4.2–6.3)
HCT VFR BLD AUTO: 33 % (ref 34.8–46.1)
HGB BLD-MCNC: 10.6 G/DL (ref 11.5–15.4)
IMM GRANULOCYTES # BLD AUTO: 0.02 THOUSAND/UL (ref 0–0.2)
IMM GRANULOCYTES NFR BLD AUTO: 0 % (ref 0–2)
LYMPHOCYTES # BLD AUTO: 2.46 THOUSANDS/ΜL (ref 0.6–4.47)
LYMPHOCYTES NFR BLD AUTO: 33 % (ref 14–44)
MCH RBC QN AUTO: 29.7 PG (ref 26.8–34.3)
MCHC RBC AUTO-ENTMCNC: 32.1 G/DL (ref 31.4–37.4)
MCV RBC AUTO: 92 FL (ref 82–98)
MONOCYTES # BLD AUTO: 0.79 THOUSAND/ΜL (ref 0.17–1.22)
MONOCYTES NFR BLD AUTO: 11 % (ref 4–12)
NEUTROPHILS # BLD AUTO: 3.66 THOUSANDS/ΜL (ref 1.85–7.62)
NEUTS SEG NFR BLD AUTO: 49 % (ref 43–75)
NRBC BLD AUTO-RTO: 0 /100 WBCS
PLATELET # BLD AUTO: 172 THOUSANDS/UL (ref 149–390)
PMV BLD AUTO: 10.4 FL (ref 8.9–12.7)
POTASSIUM SERPL-SCNC: 4.8 MMOL/L (ref 3.5–5.3)
RBC # BLD AUTO: 3.57 MILLION/UL (ref 3.81–5.12)
SODIUM SERPL-SCNC: 141 MMOL/L (ref 136–145)
WBC # BLD AUTO: 7.4 THOUSAND/UL (ref 4.31–10.16)

## 2019-08-01 PROCEDURE — 93660 TILT TABLE EVALUATION: CPT | Performed by: INTERNAL MEDICINE

## 2019-08-01 PROCEDURE — 83036 HEMOGLOBIN GLYCOSYLATED A1C: CPT | Performed by: INTERNAL MEDICINE

## 2019-08-01 PROCEDURE — 80048 BASIC METABOLIC PNL TOTAL CA: CPT | Performed by: INTERNAL MEDICINE

## 2019-08-01 PROCEDURE — 85025 COMPLETE CBC W/AUTO DIFF WBC: CPT | Performed by: INTERNAL MEDICINE

## 2019-08-01 PROCEDURE — 99239 HOSP IP/OBS DSCHRG MGMT >30: CPT | Performed by: INTERNAL MEDICINE

## 2019-08-01 PROCEDURE — 99232 SBSQ HOSP IP/OBS MODERATE 35: CPT | Performed by: INTERNAL MEDICINE

## 2019-08-01 PROCEDURE — 82948 REAGENT STRIP/BLOOD GLUCOSE: CPT

## 2019-08-01 RX ORDER — RANITIDINE 150 MG/1
150 TABLET ORAL
Refills: 0
Start: 2019-08-01 | End: 2022-02-17

## 2019-08-01 RX ORDER — TORSEMIDE 10 MG/1
10 TABLET ORAL DAILY
Qty: 30 TABLET | Refills: 0 | Status: SHIPPED | OUTPATIENT
Start: 2019-08-03 | End: 2022-02-20 | Stop reason: HOSPADM

## 2019-08-01 RX ORDER — CYANOCOBALAMIN 1000 UG/ML
1000 INJECTION INTRAMUSCULAR; SUBCUTANEOUS DAILY
Status: DISCONTINUED | OUTPATIENT
Start: 2019-08-01 | End: 2019-08-01 | Stop reason: HOSPADM

## 2019-08-01 RX ORDER — AMLODIPINE BESYLATE 5 MG/1
5 TABLET ORAL DAILY
Qty: 30 TABLET | Refills: 0 | Status: SHIPPED | OUTPATIENT
Start: 2019-08-02 | End: 2022-03-25 | Stop reason: SDUPTHER

## 2019-08-01 RX ORDER — AMLODIPINE BESYLATE 5 MG/1
5 TABLET ORAL DAILY
Status: DISCONTINUED | OUTPATIENT
Start: 2019-08-01 | End: 2019-08-01 | Stop reason: HOSPADM

## 2019-08-01 RX ORDER — METOPROLOL TARTRATE 75 MG/1
75 TABLET, FILM COATED ORAL EVERY 12 HOURS SCHEDULED
Qty: 60 TABLET | Refills: 0 | Status: SHIPPED | OUTPATIENT
Start: 2019-08-01 | End: 2019-09-10

## 2019-08-01 RX ADMIN — INSULIN LISPRO 4 UNITS: 100 INJECTION, SOLUTION INTRAVENOUS; SUBCUTANEOUS at 12:09

## 2019-08-01 RX ADMIN — LORATADINE 10 MG: 10 TABLET ORAL at 08:07

## 2019-08-01 RX ADMIN — CYANOCOBALAMIN 1000 MCG: 1000 INJECTION, SOLUTION INTRAMUSCULAR at 08:09

## 2019-08-01 RX ADMIN — PREGABALIN 100 MG: 100 CAPSULE ORAL at 08:26

## 2019-08-01 RX ADMIN — METOPROLOL TARTRATE 75 MG: 50 TABLET, FILM COATED ORAL at 08:06

## 2019-08-01 RX ADMIN — Medication 250 MG: at 08:08

## 2019-08-01 RX ADMIN — AMLODIPINE BESYLATE 5 MG: 5 TABLET ORAL at 12:08

## 2019-08-01 RX ADMIN — FAMOTIDINE 20 MG: 20 TABLET ORAL at 08:07

## 2019-08-01 RX ADMIN — INSULIN LISPRO 1 UNITS: 100 INJECTION, SOLUTION INTRAVENOUS; SUBCUTANEOUS at 08:09

## 2019-08-01 RX ADMIN — APIXABAN 5 MG: 5 TABLET, FILM COATED ORAL at 08:07

## 2019-08-01 RX ADMIN — INSULIN GLARGINE 30 UNITS: 100 INJECTION, SOLUTION SUBCUTANEOUS at 08:08

## 2019-08-01 NOTE — DISCHARGE SUMMARY
Discharge Summary - [facility]    Patient Information: Abbi Woods 81 y o  female MRN: [id number]  Unit/Bed#: [address] [place] [address] Encounter: [id number]    Discharging Physician / Practitioner: Manda Michelle MD  PCP: Shalini Sarabia  Admission Date: 7/29/2019  Discharge Date: 08/01/19    Reason for Admission: Syncope (States she was bending forward to put baby down on the floor " I think I passed out and fell over " Patient for colonscopy tomorrow, took her insulin this am and is drinking clear liquids ( broth and jello ) and will start cleansing at 5pm   FSBS 148 on arrival, states she feels extremely tired  Pain left knee and left shoulder  )      Discharge Diagnoses:     Principal Problem (Resolved):    Syncope and collapse  Active Problems:    Uncontrolled diabetes mellitus type 2 with hyperglycemia    Paroxysmal atrial fibrillation (HCC)    Mixed hyperlipidemia    Asthma    Morbid obesity with BMI of 45 0-49 9, adult (HCC)    Anemia    Essential hypertension  Resolved Problems:    Acute renal failure superimposed on stage 3 chronic kidney disease (HCC)        Acute renal failure superimposed on stage 3 chronic kidney disease (HCC)resolved as of 8/1/2019  Assessment & Plan  Baseline creatinine 1 2-1 42  On admission creatinine 3 5   Possibilities includes pre renal setting of diuretic and ACE inhibitor use  Recent hospitalization with a KI in setting of gastroenteritis with subsequently improved  Patient reports last blood work around 2 weeks ago with PMD, was told that her kidney function was fine  Denied any changes in medications recently except addition of ranitidine  UA bland    CT scan of the abdomen and pelvis last month without any evidence of hydronephrosis  Seen and followed by Nephrology during hospitalization  · held lisinopril and torsemide during hospitalization  · PVR, 0  · Orthostasis remains negative at presentation, monitor blood pressure  · Hold nephrotoxic meds  Improved with IV hydration down to baseline 1 48  Will continue to hold lisinopril at the time of discharge secondary to recent SEBASTAIN and borderline hyperkalemia  Continue to hold metformin  Torsemide will be resumed in 2 days  Follow-up BMP in 1 week and follow up with Nephrology after discharge    * Syncope and collapseresolved as of 8/1/2019  Assessment & Plan  Patient presented with syncopal episode from standing position  Most probably secondary to dehydration next  Noted to have acute kidney injury  though Orthostasis negative in ED  Has hx of PAF and SVT  Denied CP, palpitations  EKG showed NSR, telemetry-braycardia - improved after decreasing metoprolol  CT head with no acute intracranial findings  Possibly related to dehydration plus/minus bradycardia  · Tele monitor without significant arrhythmias  Mild bradycardia improved after decreasing metoprolol  · Neuro checks-stable  · Will continue metoprolol at lower dose  · x-ray of the left knee and left shoulder without any acute abnormality  · Cardiology evaluation appreciated, recommended tilt-table test which was negative  · Underwent nuclear stress test on 08/01, which was a normal study with mild likely artifactual defect  Normal EF  · Follow up with Cardiology after discharge      Paroxysmal atrial fibrillation [facility])  Assessment & Plan  Last echo from 5/2019 showed EF 55% with GIDD  Patient with mild bradycardia, in NSR   EKG noted  Monitor with decreased dose of metoprolol  Continue eliquis  Cardiology input appreciated  Follow up with Cardiology after discharge    Uncontrolled diabetes mellitus type 2 with hyperglycemia  Assessment & Plan  Lab Results   Component Value Date    HGBA1C 8 5 (H) 08/01/2019       Recent Labs     07/31/19  1118 07/31/19  1604 08/01/19  0704 08/01/19  1108   POCGLU 271* 313* 182* 275*       Blood Sugar Average: Last 72 hrs:  (P) 224 [id number]   Repeat hemoglobin A1c 8 5  Continue Lantus at home dose  continue to hold metformin at present  Patient was previously on trulicity which was discontinue recently by PMD  Discussed with patient regarding different alternatives and side effect profile  Will try Gabrieluvia  Advised to monitor blood sugar and symptoms  Follow up with PMD after discharge  Diabetic diet    Mixed hyperlipidemia  Assessment & Plan  Resume statin    Essential hypertension  Assessment & Plan  Orthostasis negative  Metoprolol was reduced to 75 mg twice a day  Holding lisinopril  Added Norvasc 5 mg daily  Follow-up as outpatient    Anemia  Assessment & Plan  No overt bleeding  Normocytic  Iron sat 33%, folate within normal limit  Vitamin B12 level low at 20 6, will replete  Remains stable, Monitor    Morbid obesity with BMI of 45 0-49 9, adult (HCC)  Assessment & Plan  Advised weight loss, lifestyle changes    Asthma  Assessment & Plan  No evidence of exacerbation  Continue home meds      Consultations During Hospital Stay:  IP CONSULT TO CARDIOLOGY  IP CONSULT TO NEPHROLOGY    Procedures Performed:     · Tilt-table test  · Stress test    Significant Findings:     · Refer to hospital course and above listed diagnosis related plan for details    Imaging while in hospital:    Xr Chest Pa & Lateral    Result Date: 7/31/2019  Narrative: CHEST INDICATION:   Abnormal lung exam with bilateral crackles  COMPARISON:  6/25/2019 EXAM PERFORMED/VIEWS:  XR CHEST PA & LATERAL  The frontal view was performed utilizing dual energy radiographic technique  FINDINGS: Cardiomediastinal silhouette appears unremarkable  The lungs are clear  No pneumothorax or pleural effusion  Osseous structures appear within normal limits for patient age  Impression: No acute cardiopulmonary disease  Workstation performed: [id number]     Xr Shoulder 2+ Views Left    Result Date: 7/30/2019  Narrative: LEFT SHOULDER INDICATION:   fall  COMPARISON:  None VIEWS:  XR SHOULDER 2+ VW LEFT Images: 3 FINDINGS: There is no acute fracture or dislocation   No significant degenerative changes  No lytic or blastic lesions are seen  Soft tissues are unremarkable  Impression: No acute osseous abnormality  Workstation performed: [id number]     Xr Knee 4+ Views Left Injury    Result Date: 7/30/2019  Narrative: LEFT KNEE INDICATION:   fall  COMPARISON:  05/03/2016 VIEWS:  XR KNEE 4+ VW LEFT INJURY Images: 4 FINDINGS: There is no acute fracture or dislocation  There is no joint effusion  There is patellar spurring present  There is moderate narrowing of the medial joint compartment  There is pointing of the tibial spines  No lytic or blastic lesions are seen  Soft tissues are unremarkable  Impression: No acute osseous abnormality  Moderate degenerative changes in the left knee  Workstation performed: [id number]     Ct Head Without Contrast    Result Date: 7/29/2019  Narrative: CT BRAIN - WITHOUT CONTRAST INDICATION:   fall/LOC  COMPARISON:  Head CT 5/25/2019 TECHNIQUE:  CT examination of the brain was performed  In addition to axial images, coronal 2D reformatted images were created and submitted for interpretation  Radiation dose length product (DLP) for this visit:  1050 9 mGy-cm   This examination, like all CT scans performed in the [facility], was performed utilizing techniques to minimize radiation dose exposure, including the use of iterative  reconstruction and automated exposure control  IMAGE QUALITY:  Diagnostic  FINDINGS: PARENCHYMA: No acute intracranial hemorrhage  No masslike lesion, mass effect effect or midline shift  No CT evidence of acute infarct  Decreased attenuation is noted in periventricular and subcortical white matter, which is nonspecific but most likely  to represent mild microangiopathic change  VENTRICLES AND EXTRA-AXIAL SPACES:  Normal for the patient's age  VISUALIZED ORBITS AND PARANASAL SINUSES:  The orbits are unremarkable   Polypoid mucosal thickening/retention cyst in the left locule of the sphenoid sinus CALVARIUM AND EXTRACRANIAL SOFT TISSUES:  Normal      Impression: No acute intracranial CT abnormality  Workstation performed: [id number]     Us Kidney And Bladder    Result Date: 7/31/2019  Narrative: RENAL ULTRASOUND INDICATION:   RENAL FAILURE, ACUTE arf  COMPARISON: 11/20/2017 and June 25, 2019 CT TECHNIQUE:   Ultrasound of the retroperitoneum was performed with a curvilinear transducer utilizing volumetric sweeps and still imaging techniques  FINDINGS: KIDNEYS: Symmetric and normal size  Right kidney:  10 0 x 6 4 cm  Left kidney:  11 3 x 6 9 cm  Right kidney Normal echogenicity and contour  No suspicious masses detected  No hydronephrosis  No shadowing calculi  No perinephric fluid collections  Left kidney Normal echogenicity and contour  No suspicious masses detected  No hydronephrosis  No shadowing calculi  No perinephric fluid collections  URETERS: Nonvisualized  BLADDER: Normally distended  No focal thickening or mass lesions  Bilateral ureteral jets detected  Impression: Overall detail limited by obese body habitus  No hydronephrosis  Workstation performed: [id number]       Incidental Findings:   · None    Test Results Pending at Discharge (will require follow up):   · As per After Visit Summary     Outpatient Tests Requested:  · BMP in 1 week    Complications:  Refer to hospital course and above listed diagnosis related plan, if any    Hospital Course:     Andrea Orellana is a 81 y o  female patient multiple comorbidities including diabetes mellitus type 2, hypertension, dyslipidemia, paroxysmal AFib on Eliquis, morbid obesity, CKD who originally presented to the hospital on 7/29/2019 due to a syncopal event  Patient was planning an in out patient elective colonoscopy next and had started drinking only a clear liquid diet  She was supposed to have her bowel prep started later in the day  Patient reported being on her usual medications including diuretics     Patient did reported feeling little dizzy and lightheaded  She was bending over today and fell over and had a possible very brief moment of loss of consciousness  She denies any loss of bowel or bladder function  She also denies any preceding symptoms including shortness of breath, palpitations, chest pain  Patient was subsequently brought to ED for further evaluation      In the ED orthostatics were negative  Creatinine was elevated to 3 5 from baseline of 1 3  CT head negative        Please see above list of diagnoses and related plan for additional information  Condition at Discharge: stable     Discharge Day Visit / Exam:     Subjective:  Feels much better  Ambulating in hallway without any chest pain dizziness or shortness of breath  Tolerating diet well  Denies any urinary difficulties or diarrhea  Denies abdominal pain  Left knee discomfort is improving    Vitals: Blood Pressure: 163/70 (08/01/19 1105)  Pulse: 57 (08/01/19 1105)  Temperature: 98 2 °F (36 8 °C) (08/01/19 1105)  Temp Source: Oral (08/01/19 1105)  Respirations: 18 (08/01/19 1105)  Height: 5' 2" (157 5 cm) (07/29/19 1934)  Weight - Scale: 117 kg (257 lb) (08/01/19 0600)  SpO2: 97 % (08/01/19 1105)  Exam:   Physical Exam   Constitutional: She is oriented to person, place, and time  She appears well-developed  No distress  Morbidly obese   HENT:   Head: Normocephalic and atraumatic  Nose: Nose normal    Eyes: Pupils are equal, round, and reactive to light  Conjunctivae are normal    Neck: Normal range of motion  Neck supple  Cardiovascular: Normal rate, regular rhythm and normal heart sounds  Pulmonary/Chest: Effort normal and breath sounds normal  No respiratory distress  She has no wheezes  She has no rales  Diminished   Abdominal: Soft  Bowel sounds are normal  She exhibits no distension  There is no tenderness  There is no rebound and no guarding  Musculoskeletal: Normal range of motion  She exhibits no edema or deformity          Left shoulder: She exhibits normal range of motion  Left knee: Normal  She exhibits normal range of motion and no effusion  No tenderness found  Neurological: She is alert and oriented to person, place, and time  No cranial nerve deficit  Skin: Skin is warm and dry  No rash noted  Psychiatric: She has a normal mood and affect  Discharge instructions/Information to patient and family:(Discharge Medications and Follow up):   See after visit summary for information provided to patient and family  Provisions for Follow-Up Care:  See after visit summary for information related to follow-up care and any pertinent home health orders  Disposition: Home, physical therapy was suggested secondary to recent fall but patient reported that she feels much better and she is familiar with exercises from prior knee surgery which she would continue defers any outpatient physical therapy at present    Planned Readmission:  No     Discharge Statement:  I spent 45 minutes discharging the patient  This time was spent on the day of discharge  I had direct contact with the patient on the day of discharge  Greater than 50% of the total time was spent examining patient, answering all patient questions, arranging and discussing plan of care with patient as well as directly providing post-discharge instructions  Additional time then spent on discharge activities  Discharge Medications:  See after visit summary for reconciled discharge medications provided to patient and family  ** Please Note: "This note has been constructed using a voice recognition system  Therefore there may be syntax, spelling, and/or grammatical errors   Please call if you have any questions  "**

## 2019-08-01 NOTE — PROGRESS NOTES
Progress Note - Nephrology   Naty Valera 81 y o  female MRN: [id number]  Unit/Bed#: [address] [place] [address] Encounter: [id number]    ASSESSMENT AND PLAN:  1  CKD stage 3:  Compatible with diabetic nephropathy/hypertensive nephrosclerosis/arteriolar nephrosclerosis unlikely related to FSGS from obesity given lack of proteinuria   -baseline 1 2-1 42     2  AK I:  Differential diagnosis would include prerenal certainly with the colonic preparation not eating as well in addition to the diuretic, plus vasomotor effect from the ACE-inhibitor  Doubt intrinsic disease with a bland urinalysis with no hematuria and no proteinuria  No evidence of obstructive uropathy  -UA:  Negative proteinuria hematuria or pyuria    Workup:  -renal ultrasound:  NO HYDRONEPHROSIS  -PVR with bladder scan:  NEGATIVE  CURRENT CREATININE ESSENTIALLY BACK TO BASELINE 1 48  Treatment:  -HEP-LOCK IV FLUIDS  -HOLD TORSEMIDE TODAY BUT REINITIATE TORSEMIDE AT A LOWER DOSE OF 10 MG DAILY BEGINNING IN 2 DAYS  -hold ACE-inhibitor:  FOR NOW I WOULD HOLD THE ACE-INHIBITOR SPECIALLY WITH BORDERLINE HYPERKALEMIA WITH RECENT AK I AND NO PROTEINURIA  -avoid nephrotoxic agents such as NSAIDs  -avoid hypoperfusion  -hold metformin  PATIENT CAN BE DISCHARGED TODAY FROM MY PERSPECTIVE  I WOULD DO THE ABOVE RECOMMENDATIONS REGARDING MEDICATIONS  I WILL ARRANGE FOR OUTPATIENT FOLLOW-UP      3  Hypertension:  Acceptable at this time  Avoid hypoperfusion in the setting of AK I  Course hold ACE-inhibitor and diuretics at this time   -no evidence of orthostasis  -hold ACE-inhibitor in definitely  -torsemide 10 mg daily as outlined above to begin in 2 days  -begin amlodipine 5 mg daily  -follow blood pressure at home     4  Electrolytes: All acceptable:  High normal with potassium  Continue off ACE-inhibitor  5  Mineral bone disorder:  Replete borderline magnesium before discharge  Phosphorus improved at 4 2  Follow-up as an outpatient    6  Anemia:  Possibly from chronic disease/CKD  Hemoglobin has been acceptable at 10 6  Iron studies are adequate  7  Other issues:  -atrial fibrillation  -diabetes mellitus type 2  -asthma  -dyslipidemia  -morbid obesity  -syncopal episode being evaluated by primary service and Cardiology  :  Tilt-table test was negative  :  Nuclear stress test pending           Subjective: The patient is asymptomatic  No chest pain or shortness of breath except for asthma type symptoms  No nausea vomiting or diarrhea  No active urinary symptoms  Objective:     Vitals: Blood pressure 162/73, pulse 60, temperature 98 4 °F (36 9 °C), temperature source Oral, resp  rate 20, height 5' 2" (1 575 m), weight 117 kg (257 lb), SpO2 95 %, not currently breastfeeding  ,Body mass index is 47 01 kg/m²      Weight (last 2 days)     Date/Time   Weight    08/01/19 0600   117 (257)    07/31/19 0600   115 (252 87)                Intake/Output Summary (Last 24 hours) at 8/1/2019 0809  Last data filed at 8/1/2019 0600  Gross per 24 hour   Intake 650 ml   Output 2750 ml   Net -2100 ml            Physical Exam: General:  No acute distress, obese sitting at the edge of bed  Skin:  No acute rash  Eyes:  No scleral icterus  ENT:  Moist mucous membranes  Neck:  Supple, no jugular venous distention  Chest:  Clear to auscultation except for bibasilar crackle unchanged from yesterday  CVS:  Regular rate and rhythm without evidence of a cardiac rub or gallops  Abdomen:  Obese, soft and nontender with normal bowel sounds  Extremities:  Trace lower extremity edema  Neuro:  No gross focality  Psych:  Alert and oriented                Medications:    Scheduled Meds:  Current Facility-Administered Medications:  acetaminophen 650 mg Oral Q6H PRN Satish Cloud MD    albuterol 2 puff Inhalation Q6H PRN Satish Cloud MD    apixaban 5 mg Oral BID Satish Cloud MD    cyanocobalamin 1,000 mcg Subcutaneous Daily Chapito West MD    famotidine 20 mg Oral Daily Satish Cloud MD    insulin glargine 30 Units Subcutaneous Q12H 3630 Corey Veras MD    insulin lispro 1-6 Units Subcutaneous TID AC Jer Hilton MD    loratadine 10 mg Oral Daily Jer Hilton MD    metoprolol tartrate 75 mg Oral Q12H [address] Albin Paulino MD    montelukast 10 mg Oral HS Jer Hilton MD    ondansetron 4 mg Intravenous Q6H PRN Jer Hilton MD    pregabalin 100 mg Oral BID Jer Hilton MD    saccharomyces boulardii 250 mg Oral BID Jer Hilton MD    sodium chloride 75 mL/hr Intravenous Continuous Nikhil Marroquin MD Last Rate: 75 mL/hr (07/31/19 0536)       PRN Meds:   acetaminophen    albuterol    ondansetron    Continuous Infusions:  sodium chloride 75 mL/hr Last Rate: 75 mL/hr (07/31/19 0536)       Lab, Imaging and other studies: I have personally reviewed pertinent labs    Laboratory Results:  Results from last 7 days   Lab Units 08/01/19  0505 07/31/19  0514 07/30/19  0603 07/29/19  1519   WBC Thousand/uL 7 40  --  7 20 8 75   HEMOGLOBIN g/dL 10 6*  --  10 7* 10 9*   HEMATOCRIT % 33 0*  --  33 6* 34 7*   PLATELETS Thousands/uL 172  --  159 171   POTASSIUM mmol/L 4 8 5 0 4 5 5 1   CHLORIDE mmol/L 109* 109* 107 102   CO2 mmol/L 25 25 23 25   BUN mg/dL 33* 46* 59* 65*   CREATININE mg/dL 1 48* 1 93* 2 61* 3 53*   CALCIUM mg/dL 8 1* 7 6* 7 5* 7 8*   MAGNESIUM mg/dL  --  1 6 1 6  --    PHOSPHORUS mg/dL  --  4 2*  --   --      Urinalysis: Lab Results   Component Value Date    COLORU Light Yellow 07/29/2019    CLARITYU Slightly Cloudy 07/29/2019    SPECGRAV 1 020 07/29/2019    PHUR 5 0 07/29/2019    PHUR 5 0 03/10/2018    LEUKOCYTESUR Negative 07/29/2019    NITRITE Negative 07/29/2019    GLUCOSEU Negative 07/29/2019    KETONESU Negative 07/29/2019    BILIRUBINUR Negative 07/29/2019    BLOODU Negative 07/29/2019     ABGs: No results found for: Ronnie 30  Radiology review:     Portions of the record may have been created with voice recognition software   Occasional wrong word or "sound a like" substitutions may have occurred due to the inherent limitations of voice recognition software   Read the chart carefully and recognize, using context, where substitutions have occurred

## 2019-08-01 NOTE — ASSESSMENT & PLAN NOTE
Orthostasis negative  Metoprolol was reduced to 75 mg twice a day  Holding lisinopril  Added Norvasc 5 mg daily  Follow-up as outpatient

## 2019-08-01 NOTE — PROGRESS NOTES
Progress Note - Cardiology   [facility]     Herberth Acosta 81 y o  female MRN: [id number]  : 1950  Unit/Bed#: [address] [place] [address] Encounter: [id number]    Assessment and Plan:   1  Syncope  Most likely secondary to orthostatic hypertension due to dehydration and acute kidney injury  Tilt-table test has been negative  Nuclear stress test shows no ischemia  2  Acute kidney injury  Patient's baseline creatinine is 1 3-1 4  Currently creatinine is close to baseline  Continue close monitoring  She is followed by nephrology  Lisinopril on hold    3  Essential hypertension  Patient blood pressure is still mildly elevated  Added amlodipine  Lisinopril on hold  Metoprolol decreased 75 twice a day due to marked sinus bradycardia    4  History of SVT and PAF  Currently in sinus  She was bradycardic currently heart rate is around 55-70 beats per minute    5  Obesity  Advised to lose weight    6  Diabetes mellitus  HbA1c 8 1       7  Dyslipidemia  Resume statins on discharge  Concur with other Rx provided  Subjective / Objective:    Patient seen and evaluated  No new complaints  She has chronic shortness of breath which is not changed  Creatinine has returned close to baseline  Nephrology still want to hold on lisinopril  Vitals: Blood pressure 163/70, pulse 57, temperature 98 2 °F (36 8 °C), temperature source Oral, resp  rate 18, height 5' 2" (1 575 m), weight 117 kg (257 lb), SpO2 97 %, not currently breastfeeding  Vitals:    19 0600 19 06   Weight: 115 kg (252 lb 13 9 oz) 117 kg (257 lb)     Body mass index is 47 01 kg/m²    BP Readings from Last 3 Encounters:   19 163/70   19 123/66   19 118/60     Orthostatic Blood Pressures      Most Recent Value   Blood Pressure  163/70 filed at 2019 1105   Patient Position - Orthostatic VS  Sitting filed at 2019 1105        I/O       701 -  -  07 08/01 0701 - 08/02 0700    P  O  3040 650 250    I V  (mL/kg) 597 5 (5 2) 75 (0 6) 75 (0 6)    IV Piggyback       Total Intake(mL/kg) 3637 5 (31 6) 725 (6 2) 325 (2 8)    Urine (mL/kg/hr) 1150 (0 4) 2750 (1) 450 (0 8)    Total Output 1150 2750 450    Net +2487 5 -2025 -125               Invasive Devices     Peripheral Intravenous Line            Peripheral IV 07/30/19 Right Forearm 2 days                  Intake/Output Summary (Last 24 hours) at 8/1/2019 1251  Last data filed at 8/1/2019 0930  Gross per 24 hour   Intake 600 ml   Output 2600 ml   Net -2000 ml         Physical Exam:   Physical Exam    Neurologic:  Alert & oriented x 3,  no focal deficits noted   Constitutional:  Well developed, well nourished,  With no acute distress  Eyes:  PERRL, conjunctiva normal   HENT:  Atraumatic, external ears normal, nose normal,   NECK: Normal range of motion, no tenderness, neck is supple , No JVP  Respiratory:  Bilateral air entry decreased at bases  Cardiovascular: S1-S2 regular with a 2/6 ejection systolic murmur  S4 is heard  GI:  Soft, nondistended, normal bowel sounds, nontender, no hepatosplenomegaly appreciated  Musculoskeletal:  No tenderness, no deformities      Extremities:  No significant edema  Psychiatric:  Speech and behavior appropriate             Medications/ Allergies:       Current Facility-Administered Medications:  acetaminophen 650 mg Oral Q6H PRN Odalis Gutierrez MD   albuterol 2 puff Inhalation Q6H PRN Odalis Gutierrez MD   amLODIPine 5 mg Oral Daily Dereck Keane MD   apixaban 5 mg Oral BID Odalis Gutierrez MD   cyanocobalamin 1,000 mcg Subcutaneous Daily Cori Alanis MD   famotidine 20 mg Oral Daily Odalis Gutierrez MD   insulin glargine 30 Units Subcutaneous Q12H 3630 Corey Veras MD   insulin lispro 1-6 Units Subcutaneous TID AC Odalis Gutierrez MD   loratadine 10 mg Oral Daily Odalis Gutierrez MD   metoprolol tartrate 75 mg Oral Q12H [facility] Estelle Mcclain MD   montelukast 10 mg Oral HS Odalis Gutierrez MD   ondansetron 4 mg Intravenous Q6H PRN April Dorado MD   pregabalin 100 mg Oral BID April Dorado MD   saccharomyces boulardii 250 mg Oral BID April Dorado MD       acetaminophen 650 mg Q6H PRN   albuterol 2 puff Q6H PRN   ondansetron 4 mg Q6H PRN     Allergies   Allergen Reactions    Asa [Aspirin] GI Intolerance    Indocin [Indomethacin] Other (See Comments)     Made patient "loopy"    Penicillins Hives    Percocet [Oxycodone-Acetaminophen]        VTE Pharmacologic Prophylaxis:   Eliquis    Labs:   Troponins:  Results from last 7 days   Lab Units 07/30/19  0603 07/29/19  1519   CK TOTAL U/L 77  --    TROPONIN I ng/mL  --  <0 02       CBC with diff:  Results from last 7 days   Lab Units 08/01/19  0505 07/30/19  0603 07/29/19  1519   WBC Thousand/uL 7 40 7 20 8 75   HEMOGLOBIN g/dL 10 6* 10 7* 10 9*   HEMATOCRIT % 33 0* 33 6* 34 7*   MCV fL 92 94 92   PLATELETS Thousands/uL 172 159 171   MCH pg 29 7 29 8 29 0   MCHC g/dL 32 1 31 8 31 4   RDW % 13 6 13 3 13 5   MPV fL 10 4 11 1 10 4   NRBC AUTO /100 WBCs 0  --  0       CMP:  Results from last 7 days   Lab Units 08/01/19  0505 07/31/19  0514 07/30/19  0603 07/29/19  1519   SODIUM mmol/L 141 142 139 136   POTASSIUM mmol/L 4 8 5 0 4 5 5 1   CHLORIDE mmol/L 109* 109* 107 102   CO2 mmol/L 25 25 23 25   ANION GAP mmol/L 7 8 9 9   BUN mg/dL 33* 46* 59* 65*   CREATININE mg/dL 1 48* 1 93* 2 61* 3 53*   CALCIUM mg/dL 8 1* 7 6* 7 5* 7 8*   AST U/L  --   --   --  12   ALT U/L  --   --   --  27   ALK PHOS U/L  --   --   --  47   TOTAL PROTEIN g/dL  --   --   --  6 7   ALBUMIN g/dL  --   --   --  3 5   TOTAL BILIRUBIN mg/dL  --   --   --  0 40   EGFR ml/min/1 73sq m 36 26 18 13       Magnesium:  Results from last 7 days   Lab Units 07/31/19  0514 07/30/19  0603   MAGNESIUM mg/dL 1 6 1 6     TSH:  Results from last 7 days   Lab Units 07/30/19  0603   TSH 3RD GENERATON uIU/mL 2 017     Hgb A1c:  Results from last 7 days   Lab Units 08/01/19  0505   HEMOGLOBIN A1C % 8 5*         Imaging & Testing   I have personally reviewed pertinent reports  Xr Chest Pa & Lateral    Result Date: 7/31/2019  Narrative: CHEST INDICATION:   Abnormal lung exam with bilateral crackles  COMPARISON:  6/25/2019 EXAM PERFORMED/VIEWS:  XR CHEST PA & LATERAL  The frontal view was performed utilizing dual energy radiographic technique  FINDINGS: Cardiomediastinal silhouette appears unremarkable  The lungs are clear  No pneumothorax or pleural effusion  Osseous structures appear within normal limits for patient age  Impression: No acute cardiopulmonary disease  Workstation performed: [id number]     Xr Shoulder 2+ Views Left    Result Date: 7/30/2019  Narrative: LEFT SHOULDER INDICATION:   fall  COMPARISON:  None VIEWS:  XR SHOULDER 2+ VW LEFT Images: 3 FINDINGS: There is no acute fracture or dislocation  No significant degenerative changes  No lytic or blastic lesions are seen  Soft tissues are unremarkable  Impression: No acute osseous abnormality  Workstation performed: [id number]     Xr Knee 4+ Views Left Injury    Result Date: 7/30/2019  Narrative: LEFT KNEE INDICATION:   fall  COMPARISON:  05/03/2016 VIEWS:  XR KNEE 4+ VW LEFT INJURY Images: 4 FINDINGS: There is no acute fracture or dislocation  There is no joint effusion  There is patellar spurring present  There is moderate narrowing of the medial joint compartment  There is pointing of the tibial spines  No lytic or blastic lesions are seen  Soft tissues are unremarkable  Impression: No acute osseous abnormality  Moderate degenerative changes in the left knee  Workstation performed: [id number]     Ct Head Without Contrast    Result Date: 7/29/2019  Narrative: CT BRAIN - WITHOUT CONTRAST INDICATION:   fall/LOC  COMPARISON:  Head CT 5/25/2019 TECHNIQUE:  CT examination of the brain was performed  In addition to axial images, coronal 2D reformatted images were created and submitted for interpretation    Radiation dose length product (DLP) for this visit:  1050 9 mGy-cm   This examination, like all CT scans performed in the [facility], was performed utilizing techniques to minimize radiation dose exposure, including the use of iterative  reconstruction and automated exposure control  IMAGE QUALITY:  Diagnostic  FINDINGS: PARENCHYMA: No acute intracranial hemorrhage  No masslike lesion, mass effect effect or midline shift  No CT evidence of acute infarct  Decreased attenuation is noted in periventricular and subcortical white matter, which is nonspecific but most likely  to represent mild microangiopathic change  VENTRICLES AND EXTRA-AXIAL SPACES:  Normal for the patient's age  VISUALIZED ORBITS AND PARANASAL SINUSES:  The orbits are unremarkable  Polypoid mucosal thickening/retention cyst in the left locule of the sphenoid sinus CALVARIUM AND EXTRACRANIAL SOFT TISSUES:  Normal      Impression: No acute intracranial CT abnormality  Workstation performed: [id number]     Us Kidney And Bladder    Result Date: 7/31/2019  Narrative: RENAL ULTRASOUND INDICATION:   RENAL FAILURE, ACUTE arf  COMPARISON: 11/20/2017 and June 25, 2019 CT TECHNIQUE:   Ultrasound of the retroperitoneum was performed with a curvilinear transducer utilizing volumetric sweeps and still imaging techniques  FINDINGS: KIDNEYS: Symmetric and normal size  Right kidney:  10 0 x 6 4 cm  Left kidney:  11 3 x 6 9 cm  Right kidney Normal echogenicity and contour  No suspicious masses detected  No hydronephrosis  No shadowing calculi  No perinephric fluid collections  Left kidney Normal echogenicity and contour  No suspicious masses detected  No hydronephrosis  No shadowing calculi  No perinephric fluid collections  URETERS: Nonvisualized  BLADDER: Normally distended  No focal thickening or mass lesions  Bilateral ureteral jets detected  Impression: Overall detail limited by obese body habitus  No hydronephrosis  Workstation performed: [id number]        EKG / Monitor: Personally reviewed  Normal sinus rhythm heart rate around 60 beats per minute  She does get periods of short atrial run with aberration    Cardiac testing:   Results for orders placed during the hospital encounter of 19   Echo complete with contrast if indicated    Narrative [address]  [address]  [address] [facility], [place] [address]  [phone]    Transthoracic Echocardiogram  2D, M-mode, Doppler, and Color Doppler    Study date:  29-May-2019    Patient: Champ Anand  MR number: [id number]  Account number: [de-identified]  : 1950  Age: 79 years  Gender: Female  Status: Inpatient  Location: Bedside  Height: 62 in  Weight: 250 6 lb  BP: 133/ 62 mmHg    Indications: Tachycardia    Diagnoses: I11 9 - Hypertensive heart disease without heart failure    Sonographer:  QUETA Reese  Referring Physician:  Jose Perez MD  Group:  [facility]  Interpreting Physician:  Jagruti Brannon DO    SUMMARY    LEFT VENTRICLE:  Systolic function was normal by visual assessment  Ejection fraction was estimated to be 55 %  There were no regional wall motion abnormalities  Wall thickness was mildly to moderately increased  Doppler parameters were consistent with abnormal left ventricular relaxation (grade 1 diastolic dysfunction)  MITRAL VALVE:  There was mild regurgitation  AORTIC VALVE:  There was no evidence for stenosis  There was no regurgitation  TRICUSPID VALVE:  There was mild regurgitation  Pulmonary artery systolic pressure was within the normal range  HISTORY: PRIOR HISTORY: Diabetes,Diabetes, HTN, Hyperlipidemia, A Fib  PROCEDURE: The procedure was performed at the bedside  This was a routine study  The transthoracic approach was used  The study included complete 2D imaging, M-mode, complete spectral Doppler, and color Doppler  The heart rate was 77 bpm,  at the start of the study   Images were obtained from the parasternal, apical, subcostal, and suprasternal notch acoustic windows  Echocardiographic views were limited due to restricted patient mobility, poor patient compliance, poor  acoustic window availability, decreased penetration, and lung interference  This was a technically difficult study  LEFT VENTRICLE: Size was normal  Systolic function was normal by visual assessment  Ejection fraction was estimated to be 55 %  There were no regional wall motion abnormalities  Wall thickness was mildly to moderately increased  DOPPLER:  Doppler parameters were consistent with abnormal left ventricular relaxation (grade 1 diastolic dysfunction)  RIGHT VENTRICLE: The size was normal  Systolic function was normal     LEFT ATRIUM: The atrium was mildly dilated  RIGHT ATRIUM: Size was normal     MITRAL VALVE: Valve structure was normal  There was normal leaflet separation  No echocardiographic evidence for prolapse  DOPPLER: The transmitral velocity was within the normal range  There was no evidence for stenosis  There was mild  regurgitation  AORTIC VALVE: The valve was trileaflet  Leaflets exhibited normal thickness, normal cuspal separation, and sclerosis  DOPPLER: Transaortic velocity was within the normal range  There was no evidence for stenosis  There was no  regurgitation  TRICUSPID VALVE: The valve structure was normal  There was normal leaflet separation  DOPPLER: The transtricuspid velocity was within the normal range  There was mild regurgitation  Pulmonary artery systolic pressure was within the normal  range  Estimated peak PA pressure was 32 mmHg  PULMONIC VALVE: Leaflets exhibited normal thickness, no calcification, and normal cuspal separation  DOPPLER: The transpulmonic velocity was within the normal range  There was no regurgitation  PERICARDIUM: There was no thickening  There was no pericardial effusion  AORTA: The root exhibited normal size      PULMONARY ARTERY: The size was normal  The morphology appeared normal     SYSTEM MEASUREMENT TABLES    2D mode  AoR Diam 2D: 3 6 cm  LA Diam (2D): 3 9 cm  LA/Ao (2D): 1 08  FS (2D Teich): 26 9 %  IVSd (2D): 1 29 cm  LVDEV: 75 1 cmï¾³  LVESV: 35 3 cmï¾³  LVIDd(2D): 4 12 cm  LVISd (2D): 3 01 cm  LVOT Area 2D: 3 14 cmï¾²  LVPWd (2D): 1 2 cm  SV (Teich): 39 8 cmï¾³    Apical four chamber  LVEF A4C: 51 %    Unspecified Scan Mode  HANNA Cont Eq (Peak Gutierrez): 2 58 cmï¾²  LVOT Diam : 2 cm  LVOT Vmax: 963 mm/s  LVOT Vmax; Mean: 963 mm/s  Peak Grad ; Mean: 4 mm[Hg]  MV Peak A Gutierrez: 893 mm/s  MV Peak E Gutierrez  Mean: 805 mm/s  MVA (PHT): 3 67 cmï¾²  PHT: 60 ms  Max P mm[Hg]  V Max: 2360 mm/s  Vmax: 2430 mm/s  RA Area: 12 8 cmï¾²  RA Volume: 27 6 cmï¾³  TAPSE: 2 cm    [employer] Accredited [facility]ocardi[facility]    Prepared and electronically signed by    Dominga Martinez DO  Signed 29-May-2019 16:19:07           Dr Mahogany Wayne MD [facility]      "This note has been constructed using a voice recognition system  Therefore there may be syntax, spelling, and/or grammatical errors   Please call if you have any questions  "

## 2019-08-05 ENCOUNTER — TELEPHONE (OUTPATIENT)
Dept: NEPHROLOGY | Facility: CLINIC | Age: 69
End: 2019-08-05

## 2019-08-06 ENCOUNTER — PATIENT OUTREACH (OUTPATIENT)
Dept: CASE MANAGEMENT | Facility: OTHER | Age: 69
End: 2019-08-06

## 2019-08-06 LAB
CHEST PAIN STATEMENT: NORMAL
MAX DIASTOLIC BP: 80 MMHG
MAX HEART RATE: 116 BPM
MAX PREDICTED HEART RATE: 153 BPM
MAX. SYSTOLIC BP: 177 MMHG
PROTOCOL NAME: NORMAL
REASON FOR TERMINATION: NORMAL
TARGET HR FORMULA: NORMAL
TEST INDICATION: NORMAL
TIME IN EXERCISE PHASE: NORMAL

## 2019-08-07 NOTE — PROGRESS NOTES
NEPHROLOGY OFFICE VISIT   Naty Valera 81 y o  female MRN: [id number]  8/8/2019    Reason for Visit:  Hospital follow-up    INTERVAL HISTORY and SUBJECTIVE:    I had the pleasure of seeing Natty Krishnamurthy today in the renal clinic for hospital follow-up  She was hospitalized recently from 07/29/19-08/01/2019 due to syncopal episode  She has a history of diabetes mellitus type 2, PAF, hyperlipidemia, asthma and hypertension  Nephrology was consulted for management of acute kidney injury on CKD  Creatinine elevated to 3 5 on admission with a baseline between 1 2-1 4  She had also sustained a recent acute kidney injury during hospitalization in June related to gastroenteritis  She is weak and easily fatigued from recent hospitalizations  She has persistent lower extremity edema  Weight loss 4 lbs  Patient denies any chest pain, shortness of breath and unusual swelling  No urinary symptoms  No nausea, vomiting, diarrhea  The last blood work was done on 8/8/2019, which we have reviewed together  She will still need prep for colonoscopy which is on hold at this time  ASSESSMENT AND PLAN:    1  Recent acute kidney injury x2:  · Last hospitalization 2/2 syncopal episode- SEBASTIAN felt to be related to volume depletion since patient was undergoing colonic prep for colonoscopy, plus/minus ACE-inhibitor, recent SEBASTIAN  No evidence of obstructive uropathy  · Creatinine 3 5 on admission down to 1 48 at discharge  · Hospitalized in June for gastroenteritis-creatinine up to 2 14 on admission and down to 1 3 at discharge  · Hospitalized for sepsis in May related to cellulitis of the lower extremity  Creatinine 1 4    · Renal ultrasound:  No hydronephrosis  Right kidney 10 cm, left kidney 11 3 cm, normal echogenicity and contour  · Follow up creatinine 1 5, GFR 35  · Plan:   · Hold ACEi, avoid nephrotoxic agents  Elevated blood sugar- off metformin  Patient encouraged to follow up with PCP  Good BP control- avoid hypotension  · Follow-up blood work in 4 weeks and again before next appointment in October/November  · Follow up with Dr Humberto Gaitan  2  CKD, stage III:    · Baseline creatinine 1 2-1 4     · Etiology diabetic nephropathy, hypertensive nephrosclerosis, arteriolar nephrosclerosis  3  Hypertension:    · Metoprolol 75 mg twice a day, Norvasc 5 mg daily, lisinopril on hold due to acute kidney injury  · Torsemide was resumed 2 days after discharge at a dose of torsemide 10 mg daily  · Continue to hold lisinopril  · Blood pressure acceptable  · No change in medication at this time  4  Syncopal episode: r/t bradycardia likely  5  Electrolytes:    · Potassium was high normal-Ace on hold  · Follow-up potassium 4 8  6  PAF:    · On Eliquis, metoprolol which was recently decreased during hospitalization due to mild bradycardia  · Rhythm regular, rate acceptable   7  Anemia:    · Hemoglobin near 10 6  · Iron saturation 33%, folate within normal limits, vitamin B12 level low given repletion during hospitalization  8  Morbid obesity  9  Asthma  10  Diabetes mellitus type 2:    · Hemoglobin A1c 8 5 on 08/01/2019  · Blood sugars elevated  · Metformin can be resumed since GFR >30  11  Mixed hyperlipidemia:  Recent myocardial perfusion SPECT within normal limits  12  Shingles: March 2018    PATIENT INSTRUCTIONS:    Patient Instructions     1  No change in medications  2  Call if BP greater than 140/80 or less than 110/60  3  Follow up blood work in 4-6 weeks  4  Follow up appointment in 2-3 months        Acute Kidney Injury (SEBASTIAN)  You have been diagnosed with Acute Kidney Injury (SEBASTIAN)  The following information has been developed to provide you with information about SEBASTIAN and treatment  What is Acute Kidney Injury (SEBASTIAN)? SEBASTIAN occurs when kidney function decreases over a short period of time   This condition causes a buildup of waste products in the blood and can cause fluid to build up causing swelling in the legs and shortness of breath  Sometimes called Acute Kidney Failure or Acute Renal Failure, SEBASTIAN is often reversible if it is found and treated quickly  How do you know if you have SEBASTIAN? SEBASTIAN is diagnosed by assessing kidney function  This is done by obtaining a blood test to measure the blood level of creatinine  Decreased urine output can sometimes also indicate SEBASTIAN  Who is at risk for SEBASTIAN? SEBASTIAN can happen to anyone but usually happens to people who are already sick and may be in the hospital  People are at higher risk for SEBASTIAN if they have any of the following:   age 72 years or older   high or low blood pressure   underlying kidney disease (e g , Chronic Kidney Disease (CKD)   peripheral vascular disease (hardening of arteries)   chronic diseases such as liver disease, heart disease and diabetes   a single kidney    What are the symptoms of SEBASTIAN? You may or may not have the symptoms to suggest you have kidney injury until the SEBASTIAN has progressed  Some of the symptoms are listed below:   Not making enough urine   Increased swelling in legs    Feeling tired   Trouble breathing or shortness of breath   Nausea   New or worsening confusion    What causes SEBASTIAN? The causes are divided into three categories:   Not enough blood flowing to the kidneys (e g , low blood pressure, bleeding, diarrhea, dehydration)   Injury directly to the kidneys (e g , blood clots, severe infections such as sepsis, medicine toxicity, IV contrast dye used for cardiac catheterization or CT scans)   Blockage to the tubes (ureters) that drain the urine from the kidneys (e g , enlarged prostate, kidney stones, blood clots)    What is the treatment for SEBASTIAN? The treatment for SEBASTIAN depends on correcting what caused it  Treatment usually involves removing the cause and measures to prevent further injury to the kidneys  This may require the use of intravenous fluids or medications and/or temporary dialysis      Dialysis is a process using a machine that does the job of the kidneys to remove waste and help correct the electrolyte and fluid balance while the kidneys are recovering  If dialysis is needed to treat SEBASTIAN, the doctor will assess daily to see if the kidneys are showing signs of recovery  The daily assessments determine how long dialysis needs to continue  Depending on the cause and the extent of damage, an episode of SEBASTIAN may resolve in a few days to several weeks to several months  What are the long term effects of having an episode of SEBASTIAN?  People who have one episode of SEBASTIAN are at an increased risk of having another episode of SEBASTIAN as well as other health problems such as kidney disease, stroke, and heart disease   In a small number of people who had unrecognized kidney disease, an SEBASTIAN episode may result in Chronic Kidney Disease (CKD) which requires lifelong monitoring and treatment  How do you prevent future episodes of SEBASTIAN?  Make sure to follow up with the kidney doctor after hospital discharge and obtain blood work to reassess and monitor kidney function   If you have diabetes, keep your blood sugar in goal range and keep appointments with your diabetes specialist    Jorge Funez If you have high blood pressure, have your blood pressure checked regularly to make sure it is in target range  o If you take blood pressure medicine called ACEIs or ARBs (e g , Lisinopril, Enalapril, Diovan, Losartan), your doctor may tell you to skip a dose or two if you have severe dehydration and your blood pressure is running low   Avoid using medicines such as NSAIDs (Nonsteroidal Anti-inflammatory Drugs) and Stanton-2 Inhibitors (a type of NSAID) that may be harmful to kidney function  These may include the medicines listed in the table that follows  Examples of NSAIDS and Stanton-2 Inhibitors   Talk to your doctor or healthcare provider before stopping any medicine ordered for you     Celecoxib (CELEBREX) Ketoprofen (ORUDIS, ORUVAIL)   Diclofenac (VOLTAREN, CATAFLAM) Ketorolac (TORADOL)   Diflunisal (DOLOBID) Meloxicam (MOBIC)   Etodolac (LODINE) Nabumetone (RELAFEN)   Fenoprofen (NALFON) Naproxen (ALEVE, NAPROSYN,    NAPRELAN, ANAPROX)   Flurbiprofen (ANSAID) Oxaprozin (DAYPRO)   Ibuprofen (MOTRIN, ADVIL) Piroxicam (FELDENE)   Indomethacin (INDOCIN) Sulindac (CLINORIL)    Tolmetin (Janelle Radha)     Is there a special diet for people with SEBASTIAN? People with SEBASTIAN and/or other kidney disease often have high potassium and phosphorus levels in their blood  To protect the kidneys from further injury and to avoid complications, most doctors recommend following a healthy diet choosing foods low potassium and low phosphorus   Limiting dietary potassium to 2 5 grams/day and phosphorus to 800 milligrams/day is recommended   A dietitian can help you with learning more about this type of diet   The tables on the following page may help you to choose lower potassium and phosphorus foods  The following websites are also good sources of information:  Jorge at  org/nutrition/Kidney-Disease-Stages-1-4   [employer]   https://www niddk nih gov/health-information/kidney-disease/chronic-kidney-disease-ckd/eating-nutrition  https://[facility] org/health/articles/15641-renal-diet-basics  [facility] com cy    If you have any questions or concerns about your condition, please contact your doctor or healthcare provider  These tables may help you to choose lower potassium and phosphorus foods    AVOID these higher phosphorus* foods: CHOOSE these lower phosphorus* foods:   Milk, pudding , yogurt made from animals and from many soy varieties Rice milk (unfortified), nondairy creamer   Hard cheeses, ricotta, cottage cheese, fat-free cream cheese Regular and low-fat cream cheese   Ice cream, frozen yogurt Sherbet, frozen fruit pops, sorbet   Soups made with milk, dried peas, beans, lentils or other high phosphorus ingredients Soups made with broth, are water-based, or other lower phosphorus ingredients   Whole grains, including whole-grain breads, crackers, cereal, rice and pasta, corn tortillas Refined grains, including white bread, crackers, cereals, rice and pasta   Quick breads, biscuits, cornbread, muffins, pancakes, waffles, granola, wheat germ Homemade refined (white) dinner rolls, bagels, English muffins, sugar cookies, shortbread cookies, angie food cake   Dried peas (split, black-eyed), beans (black, garbanzo, lima, kidney, navy, vazquez), lentils Green peas (canned, frozen), green beans, wax beans   Organ meats, walleye, [place], sardines Lean beef, pork, lamb, poultry, other fish   Nuts and seeds Popcorn   Peanut butter, other nut butters; tofu, veggie or soy burgers Jam, jelly, honey   Chocolate, including chocolate drinks Carob (chocolate-flavored) candy, hard candy,  gumdrops   Jaye, pepper-type sodas, flavored rodriguez, bottled teas, beer Lemon-lime soda, ginger ale or root beer, plain water, cream soda, grape soda   *Always read labels and avoid foods with ingredients containing "phos"  AVOID these higher potassium foods: CHOOSE these lower potassium foods:      Milk (fat free, low fat, whole, buttermilk, Soy), yogurt    Regular and low-fat cream cheese      Beans (white, [place]), [place] sprouts, spinach Swiss       chard, broccoli, avocado, artichoke, potatoes, sweet      potatoes, tomatoes/tomato sauce, beet greens      Green beans, alfalfa sprouts, bamboo shoots (canned),       cabbage, carrots, cauliflower, corn, cucumber, eggplant,      endive, lettuce, mushrooms, onions, radishes,  watercress,       water chestnuts (canned), rice, peas      Halibut, tuna, cod, snapper, tuna fish, turkey    Egg, lean beef, pork, lamb, shellfish, chicken       Banana, papaya, orange, cantaloupe, dates, raisins and      other dried fruit, pomegranate, avocado      Apple, applesauce, blackberries, raspberries, pears,     watermelon, cucumbers, blueberries, cranberries,       peaches      Almonds, peanuts, hazelnuts, [place], cashew, mixed,      seeds (sunflower, pumpkin)     Homemade refined (white) dinner rolls, bagels,      English muffins, flour tortilla, crackers, juancho      crackers, popcorn, pretzels, spaghetti or macaroni,       hummus      Tomato or vegetable juice, prune juice    Papaya, evert, or pear nectar, cranberry juice cocktail      Molasses                                                                       Orders Placed This Encounter   Procedures    Basic metabolic panel     This is a patient instruction: Patient fasting for 8 hours or longer recommended  Standing Status:   Future     Standing Expiration Date:   9/8/2019    CBC     Standing Status:   Future     Standing Expiration Date:   1/8/2020    Magnesium     Standing Status:   Future     Standing Expiration Date:   1/8/2020    Renal function panel     Standing Status:   Future     Standing Expiration Date:   1/8/2020    Urinalysis with microscopic     Standing Status:   Future     Standing Expiration Date:   1/8/2020    PTH, intact     Standing Status:   Future     Standing Expiration Date:   1/8/2020    Vitamin D 25 hydroxy     Standing Status:   Future     Standing Expiration Date:   1/8/2020       OBJECTIVE:  Current Weight: Weight - Scale: 114 kg (251 lb)  Vitals:    08/08/19 1618 08/08/19 1635 08/08/19 1637   BP:  128/70 124/70   BP Location:  Left arm Right arm   Patient Position:  Sitting Sitting   Cuff Size:  Standard Large   Weight: 114 kg (251 lb)     Height: 5' 2" (1 575 m)      Body mass index is 45 91 kg/m²  REVIEW OF SYSTEMS:    Review of Systems   Constitutional: Positive for fatigue  Negative for unexpected weight change  HENT: Negative  Respiratory: Negative  Cardiovascular: Positive for leg swelling  Negative for chest pain and palpitations     Gastrointestinal: Negative  Genitourinary: Negative  Musculoskeletal: Positive for arthralgias and joint swelling  Skin: Negative  Neurological: Negative  Psychiatric/Behavioral: Negative  PHYSICAL EXAM:      Physical Exam   Constitutional: She is oriented to person, place, and time  She appears well-developed and well-nourished  No distress  HENT:   Head: Normocephalic and atraumatic  Mouth/Throat: Oropharynx is clear and moist    Eyes: Pupils are equal, round, and reactive to light  Conjunctivae are normal  Right eye exhibits no discharge  Left eye exhibits no discharge  No scleral icterus  Neck: Normal range of motion  Neck supple  No JVD present  No tracheal deviation present  No thyromegaly present  Cardiovascular: Normal rate, regular rhythm and intact distal pulses  Exam reveals no gallop and no friction rub  No murmur heard  Pulmonary/Chest: Effort normal and breath sounds normal  No stridor  No respiratory distress  She has no wheezes  She has no rales  Abdominal: Soft  Bowel sounds are normal  She exhibits no distension and no mass  There is no tenderness  There is no rebound and no guarding  Musculoskeletal: Normal range of motion  She exhibits edema  Neurological: She is alert and oriented to person, place, and time  Cranial nerve deficit: mild to moderate lower extremity edema  Skin: Skin is warm and dry  No rash noted  She is not diaphoretic  There is erythema ( mild erythema right pretibial area where she had cellulitis in the past)  No pallor  Psychiatric: She has a normal mood and affect   Her behavior is normal  Judgment and thought content normal        Medications:    Current Outpatient Medications:     albuterol (PROVENTIL HFA,VENTOLIN HFA) 90 mcg/act inhaler, Inhale 2 puffs every 6 (six) hours as needed for wheezing, Disp: , Rfl:     amLODIPine (NORVASC) 5 mg tablet, Take 1 tablet (5 mg total) by mouth daily, Disp: 30 tablet, Rfl: 0    apixaban (ELIQUIS) 5 mg, Take 1 tablet (5 mg total) by mouth 2 (two) times a day (Patient taking differently: Take 5 mg by mouth 2 (two) times a day Patient off Eliquis for EGD and colonscopy   Last dose 7/20), Disp: 60 tablet, Rfl: 0    Cyanocobalamin 1000 MCG SUBL, Place 1 tablet (1,000 mcg total) under the tongue daily, Disp: 60 tablet, Rfl: 0    desloratadine (CLARINEX) 5 MG tablet, Take by mouth, Disp: , Rfl:     Insulin Glargine (TOUJEO SOLOSTAR) injection pen 300 units/mL, Inject 30 Units under the skin 2 (two) times a day , Disp: , Rfl:     metoprolol tartrate 75 MG TABS, Take 1 tablet (75 mg total) by mouth every 12 (twelve) hours, Disp: 60 tablet, Rfl: 0    pregabalin (LYRICA) 100 mg capsule, Take 100 mg by mouth 2 (two) times a day , Disp: , Rfl:     ranitidine (ZANTAC) 150 mg tablet, Take 1 tablet (150 mg total) by mouth daily at bedtime, Disp: , Rfl: 0    rosuvastatin (CRESTOR) 20 MG tablet, Take 20 mg by mouth daily, Disp: , Rfl:     sitaGLIPtin (JANUVIA) 50 mg tablet, Take 1 tablet (50 mg total) by mouth daily, Disp: 30 tablet, Rfl: 0    torsemide (DEMADEX) 10 mg tablet, Take 1 tablet (10 mg total) by mouth daily for 30 days, Disp: 30 tablet, Rfl: 0    montelukast (SINGULAIR) 10 mg tablet, Take 10 mg by mouth daily  , Disp: , Rfl:     saccharomyces boulardii (FLORASTOR) 250 mg capsule, Take 1 capsule (250 mg total) by mouth 2 (two) times a day (Patient not taking: Reported on 6/5/2019), Disp: 10 capsule, Rfl: 0    Laboratory Results:  Results from last 7 days   Lab Units 08/08/19  0958   POTASSIUM mmol/L 4 8   CHLORIDE mmol/L 100   CO2 mmol/L 31   BUN mg/dL 34*   CREATININE mg/dL 1 53*   CALCIUM mg/dL 8 7       Results for orders placed or performed in visit on 98/99/05   Basic metabolic panel (BMP)   Result Value Ref Range    Sodium 137 136 - 145 mmol/L    Potassium 4 8 3 5 - 5 3 mmol/L    Chloride 100 100 - 108 mmol/L    CO2 31 21 - 32 mmol/L    ANION GAP 6 4 - 13 mmol/L    BUN 34 (H) 5 - 25 mg/dL    Creatinine 1 53 (H) 0 60 - 1 30 mg/dL    Glucose 383 (H) 65 - 140 mg/dL    Calcium 8 7 8 3 - 10 1 mg/dL    eGFR 35 ml/min/1 73sq m

## 2019-08-08 ENCOUNTER — TRANSCRIBE ORDERS (OUTPATIENT)
Dept: ADMINISTRATIVE | Facility: HOSPITAL | Age: 69
End: 2019-08-08

## 2019-08-08 ENCOUNTER — APPOINTMENT (OUTPATIENT)
Dept: LAB | Facility: HOSPITAL | Age: 69
End: 2019-08-08
Attending: INTERNAL MEDICINE
Payer: MEDICARE

## 2019-08-08 ENCOUNTER — OFFICE VISIT (OUTPATIENT)
Dept: NEPHROLOGY | Facility: CLINIC | Age: 69
End: 2019-08-08
Payer: MEDICARE

## 2019-08-08 VITALS
BODY MASS INDEX: 46.19 KG/M2 | DIASTOLIC BLOOD PRESSURE: 70 MMHG | HEIGHT: 62 IN | SYSTOLIC BLOOD PRESSURE: 124 MMHG | WEIGHT: 251 LBS

## 2019-08-08 DIAGNOSIS — N17.9 ACUTE RENAL FAILURE SUPERIMPOSED ON STAGE 3 CHRONIC KIDNEY DISEASE, UNSPECIFIED ACUTE RENAL FAILURE TYPE: ICD-10-CM

## 2019-08-08 DIAGNOSIS — N18.30 CKD (CHRONIC KIDNEY DISEASE) STAGE 3, GFR 30-59 ML/MIN (HCC): ICD-10-CM

## 2019-08-08 DIAGNOSIS — I10 ESSENTIAL HYPERTENSION: ICD-10-CM

## 2019-08-08 DIAGNOSIS — R60.0 LOWER LEG EDEMA: Primary | ICD-10-CM

## 2019-08-08 DIAGNOSIS — N17.9 ACUTE RENAL FAILURE, UNSPECIFIED ACUTE RENAL FAILURE TYPE (HCC): ICD-10-CM

## 2019-08-08 DIAGNOSIS — N18.3 ACUTE RENAL FAILURE SUPERIMPOSED ON STAGE 3 CHRONIC KIDNEY DISEASE, UNSPECIFIED ACUTE RENAL FAILURE TYPE: ICD-10-CM

## 2019-08-08 DIAGNOSIS — D64.9 ANEMIA, UNSPECIFIED TYPE: ICD-10-CM

## 2019-08-08 LAB
ANION GAP SERPL CALCULATED.3IONS-SCNC: 6 MMOL/L (ref 4–13)
BUN SERPL-MCNC: 34 MG/DL (ref 5–25)
CALCIUM SERPL-MCNC: 8.7 MG/DL (ref 8.3–10.1)
CHLORIDE SERPL-SCNC: 100 MMOL/L (ref 100–108)
CO2 SERPL-SCNC: 31 MMOL/L (ref 21–32)
CREAT SERPL-MCNC: 1.53 MG/DL (ref 0.6–1.3)
GFR SERPL CREATININE-BSD FRML MDRD: 35 ML/MIN/1.73SQ M
GLUCOSE SERPL-MCNC: 383 MG/DL (ref 65–140)
POTASSIUM SERPL-SCNC: 4.8 MMOL/L (ref 3.5–5.3)
SODIUM SERPL-SCNC: 137 MMOL/L (ref 136–145)

## 2019-08-08 PROCEDURE — 80048 BASIC METABOLIC PNL TOTAL CA: CPT

## 2019-08-08 PROCEDURE — 36415 COLL VENOUS BLD VENIPUNCTURE: CPT

## 2019-08-08 PROCEDURE — 99214 OFFICE O/P EST MOD 30 MIN: CPT | Performed by: NURSE PRACTITIONER

## 2019-08-08 NOTE — PATIENT INSTRUCTIONS
1  No change in medications  2  Call if BP greater than 140/80 or less than 110/60  3  Follow up blood work in 4-6 weeks  4  Follow up appointment in 2-3 months        Acute Kidney Injury (SEBASTIAN)  You have been diagnosed with Acute Kidney Injury (SEBASTIAN)  The following information has been developed to provide you with information about SEBASTIAN and treatment  What is Acute Kidney Injury (SEBASTIAN)? SEBSATIAN occurs when kidney function decreases over a short period of time  This condition causes a buildup of waste products in the blood and can cause fluid to build up causing swelling in the legs and shortness of breath  Sometimes called Acute Kidney Failure or Acute Renal Failure, SEBASTIAN is often reversible if it is found and treated quickly  How do you know if you have SEBASTIAN? SEBASTIAN is diagnosed by assessing kidney function  This is done by obtaining a blood test to measure the blood level of creatinine  Decreased urine output can sometimes also indicate SEBASTIAN  Who is at risk for SEBASTIAN? SEBASTIAN can happen to anyone but usually happens to people who are already sick and may be in the hospital  People are at higher risk for SEBASTIAN if they have any of the following:   age 72 years or older   high or low blood pressure   underlying kidney disease (e g , Chronic Kidney Disease (CKD)   peripheral vascular disease (hardening of arteries)   chronic diseases such as liver disease, heart disease and diabetes   a single kidney    What are the symptoms of SEBASTIAN? You may or may not have the symptoms to suggest you have kidney injury until the SEBASTIAN has progressed  Some of the symptoms are listed below:   Not making enough urine   Increased swelling in legs    Feeling tired   Trouble breathing or shortness of breath   Nausea   New or worsening confusion    What causes SEBASTIAN?   The causes are divided into three categories:   Not enough blood flowing to the kidneys (e g , low blood pressure, bleeding, diarrhea, dehydration)   Injury directly to the kidneys (e g , blood clots, severe infections such as sepsis, medicine toxicity, IV contrast dye used for cardiac catheterization or CT scans)   Blockage to the tubes (ureters) that drain the urine from the kidneys (e g , enlarged prostate, kidney stones, blood clots)    What is the treatment for SEBASTIAN? The treatment for SEBASTIAN depends on correcting what caused it  Treatment usually involves removing the cause and measures to prevent further injury to the kidneys  This may require the use of intravenous fluids or medications and/or temporary dialysis  Dialysis is a process using a machine that does the job of the kidneys to remove waste and help correct the electrolyte and fluid balance while the kidneys are recovering  If dialysis is needed to treat SEBASTIAN, the doctor will assess daily to see if the kidneys are showing signs of recovery  The daily assessments determine how long dialysis needs to continue  Depending on the cause and the extent of damage, an episode of SEBASTIAN may resolve in a few days to several weeks to several months  What are the long term effects of having an episode of SEBASTIAN?  People who have one episode of SEBASTIAN are at an increased risk of having another episode of SEBASTIAN as well as other health problems such as kidney disease, stroke, and heart disease   In a small number of people who had unrecognized kidney disease, an SEBASTIAN episode may result in Chronic Kidney Disease (CKD) which requires lifelong monitoring and treatment  How do you prevent future episodes of SEBASTIAN?  Make sure to follow up with the kidney doctor after hospital discharge and obtain blood work to reassess and monitor kidney function     If you have diabetes, keep your blood sugar in goal range and keep appointments with your diabetes specialist    Mary Funez If you have high blood pressure, have your blood pressure checked regularly to make sure it is in target range  o If you take blood pressure medicine called ACEIs or ARBs (e g , Lisinopril, Enalapril, Diovan, Losartan), your doctor may tell you to skip a dose or two if you have severe dehydration and your blood pressure is running low   Avoid using medicines such as NSAIDs (Nonsteroidal Anti-inflammatory Drugs) and Stanton-2 Inhibitors (a type of NSAID) that may be harmful to kidney function  These may include the medicines listed in the table that follows  Examples of NSAIDS and Stanton-2 Inhibitors   Talk to your doctor or healthcare provider before stopping any medicine ordered for you  Celecoxib (CELEBREX) Ketoprofen (ORUDIS, ORUVAIL)   Diclofenac (VOLTAREN, CATAFLAM) Ketorolac (TORADOL)   Diflunisal (DOLOBID) Meloxicam (MOBIC)   Etodolac (LODINE) Nabumetone (RELAFEN)   Fenoprofen (NALFON) Naproxen (ALEVE, NAPROSYN,    NAPRELAN, ANAPROX)   Flurbiprofen (ANSAID) Oxaprozin (DAYPRO)   Ibuprofen (MOTRIN, ADVIL) Piroxicam (FELDENE)   Indomethacin (INDOCIN) Sulindac (CLINORIL)    Tolmetin (Shashi Hopper)     Is there a special diet for people with SEBASTIAN? People with SEBASTIAN and/or other kidney disease often have high potassium and phosphorus levels in their blood  To protect the kidneys from further injury and to avoid complications, most doctors recommend following a healthy diet choosing foods low potassium and low phosphorus   Limiting dietary potassium to 2 5 grams/day and phosphorus to 800 milligrams/day is recommended   A dietitian can help you with learning more about this type of diet   The tables on the following page may help you to choose lower potassium and phosphorus foods  The following websites are also good sources of information:  Jorge at  org/nutrition/Kidney-Disease-Stages-1-4 Raymundo russell  https://www niddk nih gov/health-information/kidney-disease/chronic-kidney-disease-ckd/eating-nutrition  https://[facility]/health/articles/15641-renal-diet-basics  [employer]    If you have any questions or concerns about your condition, please contact your doctor or healthcare provider  These tables may help you to choose lower potassium and phosphorus foods    AVOID these higher phosphorus* foods: CHOOSE these lower phosphorus* foods:   Milk, pudding , yogurt made from animals and from many soy varieties Rice milk (unfortified), nondairy creamer   Hard cheeses, ricotta, cottage cheese, fat-free cream cheese Regular and low-fat cream cheese   Ice cream, frozen yogurt Sherbet, frozen fruit pops, sorbet   Soups made with milk, dried peas, beans, lentils or other high phosphorus ingredients Soups made with broth, are water-based, or other lower phosphorus ingredients   Whole grains, including whole-grain breads, crackers, cereal, rice and pasta, corn tortillas Refined grains, including white bread, crackers, cereals, rice and pasta   Quick breads, biscuits, cornbread, muffins, pancakes, waffles, granola, wheat germ Homemade refined (white) dinner rolls, bagels, English muffins, sugar cookies, shortbread cookies, angie food cake   Dried peas (split, black-eyed), beans (black, garbanzo, lima, kidney, navy, vazquez), lentils Green peas (canned, frozen), green beans, wax beans   Organ meats, walleye, [place], sardines Lean beef, pork, lamb, poultry, other fish   Nuts and seeds Popcorn   Peanut butter, other nut butters; tofu, veggie or soy burgers Jam, jelly, honey   Chocolate, including chocolate drinks Carob (chocolate-flavored) candy, hard candy,  gumdrops   Jaye, pepper-type sodas, flavored rodriguez, bottled teas, beer Lemon-lime soda, ginger ale or root beer, plain water, cream soda, grape soda   *Always read labels and avoid foods with ingredients containing "phos"  AVOID these higher potassium foods: CHOOSE these lower potassium foods:      Milk (fat free, low fat, whole, buttermilk, Soy), yogurt    Regular and low-fat cream cheese      Beans (white, [place]), Vici sprouts, spinach Swiss       chard, broccoli, avocado, artichoke, potatoes, sweet      potatoes, tomatoes/tomato sauce, beet greens      Green beans, alfalfa sprouts, bamboo shoots (canned),       cabbage, carrots, cauliflower, corn, cucumber, eggplant,      endive, lettuce, mushrooms, onions, radishes,  watercress,       water chestnuts (canned), rice, peas      Halibut, tuna, cod, snapper, tuna fish, turkey    Egg, lean beef, pork, lamb, shellfish, chicken       Banana, papaya, orange, cantaloupe, dates, raisins and      other dried fruit, pomegranate, avocado      Apple, applesauce, blackberries, raspberries, pears,     watermelon, cucumbers, blueberries, cranberries,       peaches      Almonds, peanuts, hazelnuts, [place], cashew, mixed,      seeds (sunflower, pumpkin)     Homemade refined (white) dinner rolls, bagels,      English muffins, flour tortilla, crackers, juancho      crackers, popcorn, pretzels, spaghetti or macaroni,       hummus      Tomato or vegetable juice, prune juice    Papaya, evert, or pear nectar, cranberry juice cocktail      Molasses

## 2019-08-13 ENCOUNTER — PATIENT OUTREACH (OUTPATIENT)
Dept: CASE MANAGEMENT | Facility: OTHER | Age: 69
End: 2019-08-13

## 2019-08-13 NOTE — PROGRESS NOTES
Patient is going to see her PCP today  She will discuss seeing a diabetic educator and an endocrinologist with him  She states her blood sugars have been elevated since she's been off of metformin which she will also discuss with her PCP  She reports "pushing fluids" throughout the day  She drinks a lot of diluted juices, 1/2 juice & 1/2 water  Discussed that these are still very high in sugar, but she feels the difference is negligible  She verbalizes difficulty in managing multiple specialty diets, but is not interested in meeting with a dietician at this time  She is concerned about her anemia and has added red meat back into her diet  Discussed other foods high in iron, but she prefers an "occasional steak or burger"  She denies current GI symptoms, pain, worsening edema, fever, SOB  C/o fatigue, doctor is aware  Her family is buying her an electric scooter for family trips that involve a lot of walking  She states a desire to continue to work towards her goal of walking a mile & will not use the scooter for day to day activities  She agrees to one more call prior to bundle end

## 2019-08-27 ENCOUNTER — PATIENT OUTREACH (OUTPATIENT)
Dept: CASE MANAGEMENT | Facility: OTHER | Age: 69
End: 2019-08-27

## 2019-08-27 NOTE — PROGRESS NOTES
Patient saw her PCP  He did not restart her metformin due to her kidney function  She will be going for routine monthly labs to monitor  She reports her blood sugars are still high  Asked her if she discussed diabetic ed or seeing an endocrinologist with her PCP  She states she just found out her sister has cancer & she's at her house now so she "can't think about these things"  She is aware her bundle episode is ending & I will no longer be calling, but she has my number if she has questions, concerns, or needs  [facility] closure, episode end, 8/27/19

## 2019-08-29 ENCOUNTER — APPOINTMENT (OUTPATIENT)
Dept: LAB | Facility: HOSPITAL | Age: 69
End: 2019-08-29
Payer: MEDICARE

## 2019-08-29 ENCOUNTER — TRANSCRIBE ORDERS (OUTPATIENT)
Dept: ADMINISTRATIVE | Facility: HOSPITAL | Age: 69
End: 2019-08-29

## 2019-08-29 ENCOUNTER — TELEPHONE (OUTPATIENT)
Dept: OTHER | Facility: HOSPITAL | Age: 69
End: 2019-08-29

## 2019-08-29 DIAGNOSIS — N18.30 CKD (CHRONIC KIDNEY DISEASE) STAGE 3, GFR 30-59 ML/MIN (HCC): ICD-10-CM

## 2019-08-29 LAB
ANION GAP SERPL CALCULATED.3IONS-SCNC: 7 MMOL/L (ref 4–13)
BUN SERPL-MCNC: 22 MG/DL (ref 5–25)
CALCIUM SERPL-MCNC: 8.9 MG/DL (ref 8.3–10.1)
CHLORIDE SERPL-SCNC: 101 MMOL/L (ref 100–108)
CO2 SERPL-SCNC: 30 MMOL/L (ref 21–32)
CREAT SERPL-MCNC: 1.24 MG/DL (ref 0.6–1.3)
GFR SERPL CREATININE-BSD FRML MDRD: 45 ML/MIN/1.73SQ M
GLUCOSE P FAST SERPL-MCNC: 329 MG/DL (ref 65–99)
POTASSIUM SERPL-SCNC: 4.3 MMOL/L (ref 3.5–5.3)
SODIUM SERPL-SCNC: 138 MMOL/L (ref 136–145)

## 2019-08-29 PROCEDURE — 80048 BASIC METABOLIC PNL TOTAL CA: CPT

## 2019-08-29 PROCEDURE — 36415 COLL VENOUS BLD VENIPUNCTURE: CPT

## 2019-08-29 NOTE — TELEPHONE ENCOUNTER
I reported blood work to [facility] which was happily back to baseline  Creatinine down to 1 2    Follow-up appointment in [facility] November

## 2019-09-10 ENCOUNTER — OFFICE VISIT (OUTPATIENT)
Dept: CARDIOLOGY CLINIC | Facility: CLINIC | Age: 69
End: 2019-09-10
Payer: MEDICARE

## 2019-09-10 VITALS
OXYGEN SATURATION: 95 % | WEIGHT: 252 LBS | BODY MASS INDEX: 46.38 KG/M2 | DIASTOLIC BLOOD PRESSURE: 66 MMHG | HEART RATE: 60 BPM | HEIGHT: 62 IN | SYSTOLIC BLOOD PRESSURE: 122 MMHG

## 2019-09-10 DIAGNOSIS — I48.0 PAROXYSMAL ATRIAL FIBRILLATION (HCC): ICD-10-CM

## 2019-09-10 DIAGNOSIS — N18.30 CKD (CHRONIC KIDNEY DISEASE) STAGE 3, GFR 30-59 ML/MIN (HCC): ICD-10-CM

## 2019-09-10 DIAGNOSIS — I47.1 SVT (SUPRAVENTRICULAR TACHYCARDIA) (HCC): Primary | ICD-10-CM

## 2019-09-10 PROCEDURE — 99214 OFFICE O/P EST MOD 30 MIN: CPT | Performed by: INTERNAL MEDICINE

## 2019-09-10 PROCEDURE — 1124F ACP DISCUSS-NO DSCNMKR DOCD: CPT | Performed by: INTERNAL MEDICINE

## 2019-09-10 RX ORDER — ROSUVASTATIN CALCIUM 10 MG/1
10 TABLET, COATED ORAL DAILY
Refills: 0 | COMMUNITY
Start: 2019-08-29 | End: 2022-07-25 | Stop reason: SDUPTHER

## 2019-09-10 RX ORDER — PANTOPRAZOLE SODIUM 40 MG/1
40 TABLET, DELAYED RELEASE ORAL DAILY
COMMUNITY
End: 2022-02-17

## 2019-09-10 NOTE — PROGRESS NOTES
Cardiology Follow Up  Cedrick Ramirez  1950  [id number]  [address] [place] [id number] [place] [address], [address] [facility]  [place] [address] [phone]    1  SVT (supraventricular tachycardia) (Formerly [facility])  Cardiac event monitor   2  CKD (chronic kidney disease) stage 3, GFR 30-59 ml/min (Formerly [facility])     3  Paroxysmal atrial fibrillation (Formerly [facility])        Discussion/Plan:  SVT paroxysmal- continue metoprolol 50 mg twice a day plus Eliquis 5 mg twice a day  Sleep study is mild  We will also put her on event monitor for 2 weeks  Will rule out paroxysmal atrial fibrillation  We have never seen clear-cut atrial fibrillation- she had a sister rhythm of atrial tachycardia noted in the hospital      Mild sleep apnea- weight loss  Off her oral mandibular device  Acute on chronic diastolic hf- continue torsemide 10mg daily  Low salt diet  Continue metoprolol 50mg twice a day  Low sodium diet  Leg elevation    Chronic kidney disease- followed by Nephrology    Abnormal sleep screen- sleep study pending    Diabetes mellitus- on insulin    Cellulitis- on antibiotics    Lymphedema- compression sock knee high      Interval History:  79year-old with a history of SVT, abnormal sleep screen with recent hospital admission secondary to acute infection noted to have periodic atrial tachycardia  She reports having continued symptoms at night  She is pending a sleep study  She is currently on metoprolol 100 mg twice a day  She also taking eliquis 5mg twice a day    Her lower extremity edema is better    Patient Active Problem List   Diagnosis    Asthma    Uncontrolled diabetes mellitus type 2 with hyperglycemia    Morbid obesity with BMI of 45 0-49 9, adult (HCC)    Lower leg edema    Paroxysmal atrial fibrillation (HCC)    Mixed hyperlipidemia    Anemia    Essential hypertension    Acute kidney failure, unspecified (HCC)    CKD (chronic kidney disease) stage 3, GFR 30-59 ml/min (HCC)     Past Medical History:   Diagnosis Date    A-fib ([facility] )     Asthma     CKD (chronic kidney disease)     Diabetes mellitus (HCC)     GERD (gastroesophageal reflux disease)     Hyperlipidemia     Hypertension     Kidney stones     PONV (postoperative nausea and vomiting)     SVT (supraventricular tachycardia) ([facility])      Social History     Socioeconomic History    Marital status: Single     Spouse name: Not on file    Number of children: Not on file    Years of education: Not on file    Highest education level: Not on file   Occupational History    Not on file   Social Needs    Financial resource strain: Not on file    Food insecurity:     Worry: Not on file     Inability: Not on file    Transportation needs:     Medical: Not on file     Non-medical: Not on file   Tobacco Use    Smoking status: Former Smoker     Packs/day:      Years:      Pack years:      Types: Cigarettes     Last attempt to quit: 1996     Years since quittin 1    Smokeless tobacco: Never Used   Substance and Sexual Activity    Alcohol use: Not Currently     Frequency: Monthly or less     Drinks per session: 1 or 2     Binge frequency: Never     Comment: rarely    Drug use: No    Sexual activity: Not on file   Lifestyle    Physical activity:     Days per week: Not on file     Minutes per session: Not on file    Stress: Not on file   Relationships    Social connections:     Talks on phone: Not on file     Gets together: Not on file     Attends [employer] service: Not on file     Active member of club or organization: Not on file     Attends meetings of clubs or organizations: Not on file     Relationship status: Not on file    Intimate partner violence:     Fear of current or ex partner: Not on file     Emotionally abused: Not on file     Physically abused: Not on file     Forced sexual activity: Not on file   Other Topics Concern    Not on file   Social History Narrative    Not on file      Family History   Problem Relation Age of Onset    Heart disease Mother     Emphysema Maternal Grandmother     Heart disease Family      Past Surgical History:   Procedure Laterality Date    APPENDECTOMY      CYSTOSCOPY W/ LASER LITHOTRIPSY Left 7/12/2016    Procedure: CYSTOSCOPY URETEROSCOPY WITH LITHOTRIPSY HOLMIUM LASER, RETROGRADE PYELOGRAM AND INSERTION STENT URETERAL;  Surgeon: Anh Tong MD;  Location: [address] [place] [address];  Service:    Elwyn Serge DILATION AND CURETTAGE OF UTERUS      JOINT REPLACEMENT      right knee    KNEE ARTHROPLASTY Right     OK CYSTOURETHROSCOPY,URETER CATHETER Left 6/29/2016    Procedure: CYSTOSCOPY RETROGRADE PYELOGRAM WITH INSERTION STENT URETERAL, left;  Surgeon: Anh Tong MD;  Location: [address] [place] [address];  Service: Urology    SHOULDER ARTHROTOMY Left        Current Outpatient Medications:     albuterol (PROVENTIL HFA,VENTOLIN HFA) 90 mcg/act inhaler, Inhale 2 puffs every 6 (six) hours as needed for wheezing, Disp: , Rfl:     amLODIPine (NORVASC) 5 mg tablet, Take 1 tablet (5 mg total) by mouth daily, Disp: 30 tablet, Rfl: 0    apixaban (ELIQUIS) 5 mg, Take 1 tablet (5 mg total) by mouth 2 (two) times a day, Disp: 60 tablet, Rfl: 0    Cyanocobalamin 1000 MCG SUBL, Place 1 tablet (1,000 mcg total) under the tongue daily, Disp: 60 tablet, Rfl: 0    Insulin Glargine (TOUJEO SOLOSTAR) injection pen 300 units/mL, Inject 30 Units under the skin 2 (two) times a day , Disp: , Rfl:     insulin lispro (HUMALOG) 100 units/mL injection, Inject under the skin 3 (three) times a day before meals Inject 5 units subq every 8 hours before meals if fasting BS>125, Disp: , Rfl:     metoprolol tartrate 75 MG TABS, Take 1 tablet (75 mg total) by mouth every 12 (twelve) hours (Patient taking differently: Take 50 mg by mouth every 12 (twelve) hours ), Disp: 60 tablet, Rfl: 0    montelukast (SINGULAIR) 10 mg tablet, Take 10 mg by mouth daily  , Disp: , Rfl:    Faye Minus 30G X 8 MM MISC, , Disp: , Rfl: 0    pantoprazole (PROTONIX) 40 mg tablet, Take 40 mg by mouth daily, Disp: , Rfl:     pregabalin (LYRICA) 100 mg capsule, Take 100 mg by mouth 2 (two) times a day , Disp: , Rfl:     ranitidine (ZANTAC) 150 mg tablet, Take 1 tablet (150 mg total) by mouth daily at bedtime, Disp: , Rfl: 0    rosuvastatin (CRESTOR) 10 MG tablet, , Disp: , Rfl: 0    torsemide (DEMADEX) 10 mg tablet, Take 1 tablet (10 mg total) by mouth daily for 30 days, Disp: 30 tablet, Rfl: 0    desloratadine (CLARINEX) 5 MG tablet, Take by mouth, Disp: , Rfl:     saccharomyces boulardii (FLORASTOR) 250 mg capsule, Take 1 capsule (250 mg total) by mouth 2 (two) times a day (Patient not taking: Reported on 9/10/2019), Disp: 10 capsule, Rfl: 0    sitaGLIPtin (JANUVIA) 50 mg tablet, Take 1 tablet (50 mg total) by mouth daily (Patient not taking: Reported on 9/10/2019), Disp: 30 tablet, Rfl: 0  Allergies   Allergen Reactions    Asa [Aspirin] GI Intolerance    Indocin [Indomethacin] Other (See Comments)     Made patient "loopy"    Penicillins Hives    Percocet [Oxycodone-Acetaminophen]        Review of Systems:  Review of Systems   Constitutional: Negative  Negative for activity change, appetite change, chills, diaphoresis, fatigue, fever and unexpected weight change  HENT: Negative  Negative for congestion, dental problem, drooling, ear discharge, ear pain, facial swelling, hearing loss, mouth sores, nosebleeds, postnasal drip, rhinorrhea, sinus pressure, sinus pain, sneezing, sore throat, tinnitus, trouble swallowing and voice change  Eyes: Negative  Negative for photophobia, pain, redness, itching and visual disturbance  Respiratory: Negative  Negative for apnea, cough, choking, chest tightness, shortness of breath, wheezing and stridor  Cardiovascular: Positive for palpitations and leg swelling  Negative for chest pain  Gastrointestinal: Negative    Negative for abdominal distention, abdominal pain, anal bleeding, blood in stool, constipation, diarrhea, nausea, rectal pain and vomiting  Endocrine: Negative  Negative for cold intolerance, heat intolerance, polydipsia, polyphagia and polyuria  Genitourinary: Negative  Negative for decreased urine volume, difficulty urinating, dyspareunia, dysuria, enuresis, flank pain, frequency, genital sores, hematuria, menstrual problem, pelvic pain, urgency, vaginal bleeding, vaginal discharge and vaginal pain  Musculoskeletal: Negative  Negative for arthralgias, back pain, gait problem, joint swelling, myalgias, neck pain and neck stiffness  Skin: Negative  Negative for color change, pallor, rash and wound  Allergic/Immunologic: Negative  Negative for environmental allergies, food allergies and immunocompromised state  Neurological: Negative  Negative for dizziness, tremors, seizures, syncope, facial asymmetry, speech difficulty, weakness, light-headedness, numbness and headaches  Hematological: Negative  Negative for adenopathy  Does not bruise/bleed easily  Psychiatric/Behavioral: Negative  Negative for agitation, behavioral problems, confusion, decreased concentration, dysphoric mood, hallucinations, self-injury, sleep disturbance and suicidal ideas  The patient is not nervous/anxious and is not hyperactive  All other systems reviewed and are negative  Vitals:    09/10/19 1529   BP: 122/66   BP Location: Right arm   Patient Position: Sitting   Cuff Size: Large   Pulse: 60   SpO2: 95%   Weight: 114 kg (252 lb)   Height: 5' 2" (1 575 m)     Physical Exam:  Physical Exam   Constitutional: She is oriented to person, place, and time  She appears well-developed and well-nourished  No distress  HENT:   Head: Normocephalic and atraumatic  Right Ear: External ear normal    Left Ear: External ear normal    Eyes: Pupils are equal, round, and reactive to light  Conjunctivae are normal  Right eye exhibits no discharge  Left eye exhibits no discharge  No scleral icterus  Neck: Normal range of motion  Neck supple  No JVD present  No tracheal deviation present  No thyromegaly present  Cardiovascular: Normal rate and regular rhythm  Exam reveals gallop  Exam reveals no friction rub  No murmur heard  Pulmonary/Chest: Effort normal and breath sounds normal  No stridor  No respiratory distress  She has no wheezes  She has no rales  She exhibits no tenderness  Abdominal: Soft  Bowel sounds are normal  She exhibits no distension and no mass  There is no tenderness  There is no rebound and no guarding  Musculoskeletal: Normal range of motion  She exhibits no edema, tenderness or deformity  Neurological: She is alert and oriented to person, place, and time  She has normal reflexes  She displays normal reflexes  No cranial nerve deficit  She exhibits normal muscle tone  Coordination normal    Skin: Skin is warm and dry  No rash noted  She is not diaphoretic  No erythema  No pallor  Psychiatric: She has a normal mood and affect  Her behavior is normal  Judgment and thought content normal        Labs:     Lab Results   Component Value Date    WBC 7 40 08/01/2019    HGB 10 6 (L) 08/01/2019    HCT 33 0 (L) 08/01/2019    MCV 92 08/01/2019     08/01/2019     Lab Results   Component Value Date    K 4 3 08/29/2019     08/29/2019    CO2 30 08/29/2019    BUN 22 08/29/2019    CREATININE 1 24 08/29/2019    GLUF 329 (H) 08/29/2019    CALCIUM 8 9 08/29/2019    AST 12 07/29/2019    ALT 27 07/29/2019    ALKPHOS 47 07/29/2019    EGFR 45 08/29/2019     No results found for: CHOL  No results found for: HDL  No results found for: LDLCALC  No results found for: TRIG  Lab Results   Component Value Date    HGBA1C 8 5 (H) 08/01/2019       Imaging & Testing   I have personally reviewed pertinent reports  EKG: Personally reviewed  Normal sinus rhythm poor R-wave progression no acute ST changes unchanged from prior      Cardiac testing:   Results for orders placed during the hospital encounter of 19   Echo complete with contrast if indicated    Narrative [address]  [address] [facility]  [place] [address]  [phone]    Transthoracic Echocardiogram  2D, M-mode, Doppler, and Color Doppler    Study date:  29-May-2019    Patient: Bill Gonzalez  MR number: [id number]  Account number: [de-identified]  : 1950  Age: 79 years  Gender: Female  Status: Inpatient  Location: Bedside  Height: 62 in  Weight: 250 6 lb  BP: 133/ 62 mmHg    Indications: Tachycardia    Diagnoses: I11 9 - Hypertensive heart disease without heart failure    Sonographer:  QUETA Jurado  Referring Physician:  Stephanie Fernandez MD  Group:  [facility]  Interpreting Physician:  Kulwinder García DO    SUMMARY    LEFT VENTRICLE:  Systolic function was normal by visual assessment  Ejection fraction was estimated to be 55 %  There were no regional wall motion abnormalities  Wall thickness was mildly to moderately increased  Doppler parameters were consistent with abnormal left ventricular relaxation (grade 1 diastolic dysfunction)  MITRAL VALVE:  There was mild regurgitation  AORTIC VALVE:  There was no evidence for stenosis  There was no regurgitation  TRICUSPID VALVE:  There was mild regurgitation  Pulmonary artery systolic pressure was within the normal range  HISTORY: PRIOR HISTORY: Diabetes,Diabetes, HTN, Hyperlipidemia, A Fib  PROCEDURE: The procedure was performed at the bedside  This was a routine study  The transthoracic approach was used  The study included complete 2D imaging, M-mode, complete spectral Doppler, and color Doppler  The heart rate was 77 bpm,  at the start of the study  Images were obtained from the parasternal, apical, subcostal, and suprasternal notch acoustic windows   Echocardiographic views were limited due to restricted patient mobility, poor patient compliance, poor  acoustic window availability, decreased penetration, and lung interference  This was a technically difficult study  LEFT VENTRICLE: Size was normal  Systolic function was normal by visual assessment  Ejection fraction was estimated to be 55 %  There were no regional wall motion abnormalities  Wall thickness was mildly to moderately increased  DOPPLER:  Doppler parameters were consistent with abnormal left ventricular relaxation (grade 1 diastolic dysfunction)  RIGHT VENTRICLE: The size was normal  Systolic function was normal     LEFT ATRIUM: The atrium was mildly dilated  RIGHT ATRIUM: Size was normal     MITRAL VALVE: Valve structure was normal  There was normal leaflet separation  No echocardiographic evidence for prolapse  DOPPLER: The transmitral velocity was within the normal range  There was no evidence for stenosis  There was mild  regurgitation  AORTIC VALVE: The valve was trileaflet  Leaflets exhibited normal thickness, normal cuspal separation, and sclerosis  DOPPLER: Transaortic velocity was within the normal range  There was no evidence for stenosis  There was no  regurgitation  TRICUSPID VALVE: The valve structure was normal  There was normal leaflet separation  DOPPLER: The transtricuspid velocity was within the normal range  There was mild regurgitation  Pulmonary artery systolic pressure was within the normal  range  Estimated peak PA pressure was 32 mmHg  PULMONIC VALVE: Leaflets exhibited normal thickness, no calcification, and normal cuspal separation  DOPPLER: The transpulmonic velocity was within the normal range  There was no regurgitation  PERICARDIUM: There was no thickening  There was no pericardial effusion  AORTA: The root exhibited normal size      PULMONARY ARTERY: The size was normal  The morphology appeared normal     SYSTEM MEASUREMENT TABLES    2D mode  AoR Diam 2D: 3 6 cm  LA Diam (2D): 3 9 cm  LA/Ao (2D): 1 08  FS (2D Teich): 26 9 %  IVSd (2D): 1 29 cm  LVDEV: 75 1 cmï¾³  LVESV: 35 3 cmï¾³  LVIDd(2D): 4 12 cm  LVISd (2D): 3 01 cm  LVOT Area 2D: 3 14 cmï¾²  LVPWd (2D): 1 2 cm  SV (Teich): 39 8 cmï¾³    Apical four chamber  LVEF A4C: 51 %    Unspecified Scan Mode  HANNA Cont Eq (Peak Gutierrez): 2 58 cmï¾²  LVOT Diam : 2 cm  LVOT Vmax: 963 mm/s  LVOT Vmax; Mean: 963 mm/s  Peak Grad ; Mean: 4 mm[Hg]  MV Peak A Gutierrez: 893 mm/s  MV Peak E Gutierrez  Mean: 805 mm/s  MVA (PHT): 3 67 cmï¾²  PHT: 60 ms  Max P mm[Hg]  V Max: 2360 mm/s  Vmax: 2430 mm/s  RA Area: 12 8 cmï¾²  RA Volume: 27 6 cmï¾³  TAPSE: 2 cm    [facility]    Prepared and electronically signed by    Niru Dunlap DO  Signed 29-May-2019 16:19:07           Chemo Mccabe  Please call with any questions or suggestions    A description of the counseling:   Goals and Barriers:  Patient's ability to self care:  Medication side effect reviewed with patient in detail and all their questions answered  "This note has been constructed using a voice recognition system  Therefore there may be syntax, spelling, and/or grammatical errors   Please call if you have any questions  "

## 2019-09-11 ENCOUNTER — TELEPHONE (OUTPATIENT)
Dept: CARDIOLOGY CLINIC | Facility: CLINIC | Age: 69
End: 2019-09-11

## 2019-10-18 ENCOUNTER — TRANSCRIBE ORDERS (OUTPATIENT)
Dept: ADMINISTRATIVE | Facility: HOSPITAL | Age: 69
End: 2019-10-18

## 2019-10-18 ENCOUNTER — APPOINTMENT (OUTPATIENT)
Dept: LAB | Facility: HOSPITAL | Age: 69
End: 2019-10-18
Payer: MEDICARE

## 2019-10-18 DIAGNOSIS — N18.30 CKD (CHRONIC KIDNEY DISEASE) STAGE 3, GFR 30-59 ML/MIN (HCC): ICD-10-CM

## 2019-10-18 DIAGNOSIS — D64.9 ANEMIA, UNSPECIFIED TYPE: ICD-10-CM

## 2019-10-18 DIAGNOSIS — R60.0 LOWER LEG EDEMA: ICD-10-CM

## 2019-10-18 DIAGNOSIS — I10 ESSENTIAL HYPERTENSION: ICD-10-CM

## 2019-10-18 LAB
25(OH)D3 SERPL-MCNC: 18.4 NG/ML (ref 30–100)
ALBUMIN SERPL BCP-MCNC: 3.3 G/DL (ref 3.5–5)
ANION GAP SERPL CALCULATED.3IONS-SCNC: 6 MMOL/L (ref 4–13)
BILIRUB UR QL STRIP: NEGATIVE
BUN SERPL-MCNC: 19 MG/DL (ref 5–25)
CALCIUM SERPL-MCNC: 8.7 MG/DL (ref 8.3–10.1)
CHLORIDE SERPL-SCNC: 103 MMOL/L (ref 100–108)
CLARITY UR: CLEAR
CO2 SERPL-SCNC: 32 MMOL/L (ref 21–32)
COLOR UR: NORMAL
CREAT SERPL-MCNC: 1.1 MG/DL (ref 0.6–1.3)
ERYTHROCYTE [DISTWIDTH] IN BLOOD BY AUTOMATED COUNT: 12.6 % (ref 11.6–15.1)
GFR SERPL CREATININE-BSD FRML MDRD: 52 ML/MIN/1.73SQ M
GLUCOSE P FAST SERPL-MCNC: 224 MG/DL (ref 65–99)
GLUCOSE UR STRIP-MCNC: NEGATIVE MG/DL
HCT VFR BLD AUTO: 41.8 % (ref 34.8–46.1)
HGB BLD-MCNC: 13.1 G/DL (ref 11.5–15.4)
HGB UR QL STRIP.AUTO: NEGATIVE
KETONES UR STRIP-MCNC: NEGATIVE MG/DL
LEUKOCYTE ESTERASE UR QL STRIP: NEGATIVE
MAGNESIUM SERPL-MCNC: 1.6 MG/DL (ref 1.6–2.6)
MCH RBC QN AUTO: 28.7 PG (ref 26.8–34.3)
MCHC RBC AUTO-ENTMCNC: 31.3 G/DL (ref 31.4–37.4)
MCV RBC AUTO: 92 FL (ref 82–98)
NITRITE UR QL STRIP: NEGATIVE
PH UR STRIP.AUTO: 5 [PH]
PHOSPHATE SERPL-MCNC: 3.8 MG/DL (ref 2.3–4.1)
PLATELET # BLD AUTO: 203 THOUSANDS/UL (ref 149–390)
PMV BLD AUTO: 10.6 FL (ref 8.9–12.7)
POTASSIUM SERPL-SCNC: 4 MMOL/L (ref 3.5–5.3)
PROT UR STRIP-MCNC: NEGATIVE MG/DL
PTH-INTACT SERPL-MCNC: 32.2 PG/ML (ref 18.4–80.1)
RBC # BLD AUTO: 4.57 MILLION/UL (ref 3.81–5.12)
SODIUM SERPL-SCNC: 141 MMOL/L (ref 136–145)
SP GR UR STRIP.AUTO: 1.01 (ref 1–1.03)
UROBILINOGEN UR QL STRIP.AUTO: 0.2 E.U./DL
WBC # BLD AUTO: 6.62 THOUSAND/UL (ref 4.31–10.16)

## 2019-10-18 PROCEDURE — 82306 VITAMIN D 25 HYDROXY: CPT

## 2019-10-18 PROCEDURE — 36415 COLL VENOUS BLD VENIPUNCTURE: CPT

## 2019-10-18 PROCEDURE — 80069 RENAL FUNCTION PANEL: CPT

## 2019-10-18 PROCEDURE — 81003 URINALYSIS AUTO W/O SCOPE: CPT

## 2019-10-18 PROCEDURE — 85027 COMPLETE CBC AUTOMATED: CPT

## 2019-10-18 PROCEDURE — 83735 ASSAY OF MAGNESIUM: CPT

## 2019-10-18 PROCEDURE — 83970 ASSAY OF PARATHORMONE: CPT

## 2019-11-11 NOTE — PROGRESS NOTES
NEPHROLOGY OFFICE VISIT   Ankur Griggs 81 y o  female MRN: [id number]  11/12/2019    Reason for Visit:  Follow-up    INTERVAL HISTORY and SUBJECTIVE:    I had the pleasure of seeing Keron Pendleton today in the renal clinic for the continued management of CKD  She had several hospitalizations with acute kidney injury related to volume depletion and sepsis related to cellulitis  Since our last visit, there has been no ER visits or hospitilizations  She has a sinus infection and will be seeing her primary care physician later today for treatment     Other than that she is doing quite well  No fevers or chills  No urinary complaints  Lower extremity edema is well controlled  She does complain of some dizziness particularly with bending over and standing up-I encouraged her to follow-up with event recorder as planned by Cardiology  The last blood work was done on 10/18/2019, which we have reviewed together  She is quite busy helping to care for her sister and her niece's children        ASSESSMENT AND PLAN:     1   CKD, stage III:   Follows with Dr Lopez Ramos  · Renal function stable, creatinine 1 1  Baseline creatinine 1 2-1 4  · Renal ultrasound right kidney 10 cm, left kidney 11 3 cm, normal echogenicity and contour, no hydronephrosis  · Urinalysis:  Kassandra Leventhal  · Etiology diabetic nephropathy, hypertensive nephrosclerosis, arteriolar nephrosclerosis, acute kidney injury  · Continue to closely monitor renal function  Control of blood sugar and blood pressure essential   2   Hypertension/volume status:    · Metoprolol 75 mg twice a day, Norvasc 5 mg daily, lisinopril on hold since acute kidney injury  · Current torsemide 10 mg daily  · No proteinuria but continue to closely monitor  May need to reinitiate ACE-inhibitor at some time  · Lower extremity lymphedema-compression stockings  Well contolled  · Blood pressure acceptable  · No evidence of orthostasis  · No change in medication at this time  3     History of Syncopal episode: r/t bradycardia likely  4    Electrolytes:   Potassium 4 0  Magnesium 1 6   5  PAF/SVT acute on chronic diastolic CHF:   Follows with Cardiology, Dr Darcy Oviedo  · On Eliquis, metoprolol   · Cardiology planning event monitor since there is no clear-cut evidence of PAF  Will also be beneficial due to dizziness  · Torsemide 10 mg daily  · Rhythm regular, rate acceptable  6  Anemia:    · Hemoglobin has improved nicely, currently 13 1    · Iron saturation 33%, folate within normal limits, vitamin B12 level low, previously given repletion during hospitalization  · Continue to monitor, check iron studies before next appointment in February 7  CKD MBD:    · Vitamin-D level 18 4     · PTH 32 2  · Plan:  Ergo calciferol 36631 units weekly times 12 weeks  Other:  Asthma, morbid obesity, Diabetes mellitus type 2:  Hemoglobin A1c 8 5 on 08/01/2019  Insulin-dependent, Mixed hyperlipidemia:  Recent myocardial perfusion SPECT within normal limits  Shingles: March 2018  Sleep apnea-weight loss      PATIENT INSTRUCTIONS:    Patient Instructions   1  Increase magnesium intake:  Dark chocolate, avocados, nuts, legumes ( lentils, black beans, chickpeas, peas)  2  No NSAIDS  3  No change in medications  4  Follow up in 3 months  5  Urine and blood studies before next appointment  6  Vitamin-D supplement weekly times 12 weeks      Hypomagnesemia   WHAT YOU NEED TO KNOW:   Hypomagnesemia is a condition that develops when the amount of magnesium in your body is too low  Magnesium is a mineral that helps your heart, muscles, and nerves work normally  It also helps strengthen your bones  DISCHARGE INSTRUCTIONS:   Return to the emergency department if:   · You have numbness and tingling in your arms or legs  · You have painful muscle spasms and tremors in your arms or legs  · You are not able to move your muscles, and you have trouble thinking clearly       · Your heartbeat is faster than usual, or is irregular  · You have a seizure  Contact your healthcare provider if:   · You have fatigue and muscle tremors or twitching  · You become irritable and have trouble sleeping  · You have questions or concerns about your condition or care  Prevent hypomagnesemia:   · Manage health conditions  by following your treatment plan  Health conditions such as congestive heart failure, diabetes, and chronic diarrhea can put you at risk for hypomagnesemia  · Eat foods that contain magnesium every day  Ask your dietitian or healthcare provider how much magnesium you need each day  · Limit or do not drink alcohol  Alcohol can prevent your body from absorbing magnesium  Alcohol also makes your body release large amounts of magnesium through your urine  · You may need to take a magnesium supplement  Ask your healthcare provider which supplement to take and how often to take it  Foods that contain magnesium:   · Almonds, cashews, peanuts, and peanut butter    · Dark green leafy vegetables, such as spinach    · Raisins, bananas, apples, broccoli, and carrots    · Soy milk and soy beans    · Black beans and kidney beans    · Whole-wheat bread and brown rice    · Shredded-wheat cereal, oatmeal, and other breakfast cereals fortified with magnesium    · Plain low-fat yogurt and milk    · Cooked halibut  Follow up with your healthcare provider or specialist as directed:  Write down your questions so you remember to ask them during your visits  © 2017 [employer] Information is for End User's use only and may not be sold, redistributed or otherwise used for commercial purposes  All illustrations and images included in CareNotes® are the copyrighted property of [employer]  or [employer]  The above information is an  only  It is not intended as medical advice for individual conditions or treatments   Talk to your doctor, nurse or pharmacist before following any medical regimen to see if it is safe and effective for you  Orders Placed This Encounter   Procedures    Magnesium     Standing Status:   Future     Standing Expiration Date:   5/12/2020    Comprehensive metabolic panel     This is a patient instruction: Patient fasting for 8 hours or longer recommended  Standing Status:   Future     Standing Expiration Date:   5/12/2020    Urinalysis with microscopic     Standing Status:   Future     Standing Expiration Date:   5/12/2020    Protein / creatinine ratio, urine     Standing Status:   Future     Standing Expiration Date:   5/12/2020    CBC     Standing Status:   Future     Standing Expiration Date:   5/12/2020    Phosphorus     Standing Status:   Future     Standing Expiration Date:   5/12/2020    Iron Saturation %     Standing Status:   Future     Standing Expiration Date:   4/12/2020    Ferritin     Standing Status:   Future     Standing Expiration Date:   4/12/2020       OBJECTIVE:  Current Weight: Weight - Scale: 113 kg (249 lb)  Vitals:    11/12/19 0935 11/12/19 1009 11/12/19 1010 11/12/19 1019   BP:  124/66 132/72 130/74   BP Location:  Left arm Right arm Right arm   Patient Position:  Sitting Sitting Standing   Cuff Size:  Large Large Large   Pulse:   74    Weight: 113 kg (249 lb)      Height: 5' 2" (1 575 m)       Body mass index is 45 54 kg/m²  REVIEW OF SYSTEMS:    Review of Systems   Constitutional: Negative  HENT: Positive for sinus pressure and sinus pain  Eyes: Negative  Respiratory: Negative  Cardiovascular: Negative  Gastrointestinal: Negative  Genitourinary: Negative  Musculoskeletal: Negative  Skin: Negative  Neurological: Positive for dizziness (Dizziness occurs when patient bends over and stands up  She does not have dizziness upon standing from a sitting position  )  Negative for syncope, weakness and headaches  Psychiatric/Behavioral: Negative          PHYSICAL EXAM:      Physical Exam Constitutional: She is oriented to person, place, and time  No distress  HENT:   Head: Normocephalic and atraumatic  Mouth/Throat: Oropharynx is clear and moist    Eyes: Conjunctivae and EOM are normal  Right eye exhibits no discharge  Left eye exhibits no discharge  No scleral icterus  Neck: Normal range of motion  Neck supple  No JVD present  Carotid bruit is not present  No tracheal deviation present  Cardiovascular: Normal rate, regular rhythm and intact distal pulses  Exam reveals no gallop and no friction rub  No murmur heard  Pulmonary/Chest: Effort normal  No stridor  No respiratory distress  She has no wheezes  She has no rales  Somewhat decreased throughout, slightly coarse   Abdominal: Soft  Bowel sounds are normal  She exhibits no distension and no mass  There is no tenderness  There is no rebound and no guarding  Musculoskeletal: Normal range of motion  She exhibits edema (Mild lower extremity edema right greater than left)  She exhibits no tenderness or deformity  Neurological: She is alert and oriented to person, place, and time  Skin: Skin is warm and dry  No rash noted  She is not diaphoretic  No erythema  No pallor  Psychiatric: She has a normal mood and affect   Her behavior is normal  Judgment and thought content normal        Medications:    Current Outpatient Medications:     albuterol (PROVENTIL HFA,VENTOLIN HFA) 90 mcg/act inhaler, Inhale 2 puffs every 6 (six) hours as needed for wheezing, Disp: , Rfl:     amLODIPine (NORVASC) 5 mg tablet, Take 1 tablet (5 mg total) by mouth daily, Disp: 30 tablet, Rfl: 0    apixaban (ELIQUIS) 5 mg, Take 1 tablet (5 mg total) by mouth 2 (two) times a day, Disp: 60 tablet, Rfl: 0    Cyanocobalamin 1000 MCG SUBL, Place 1 tablet (1,000 mcg total) under the tongue daily, Disp: 60 tablet, Rfl: 0    desloratadine (CLARINEX) 5 MG tablet, Take by mouth, Disp: , Rfl:     Insulin Glargine (TOUJEO SOLOSTAR) injection pen 300 units/mL, Inject 30 Units under the skin 2 (two) times a day , Disp: , Rfl:     insulin lispro (HUMALOG) 100 units/mL injection, Inject under the skin 3 (three) times a day before meals Inject 5 units subq every 8 hours before meals if fasting BS>125, Disp: , Rfl:     metoprolol tartrate (LOPRESSOR) 25 mg tablet, Take 2 tablets (50 mg total) by mouth every 12 (twelve) hours, Disp: 180 tablet, Rfl: 3    montelukast (SINGULAIR) 10 mg tablet, Take 10 mg by mouth daily  , Disp: , Rfl:     NOVOFINE AUTOCOVER 30G X 8 MM MISC, , Disp: , Rfl: 0    pantoprazole (PROTONIX) 40 mg tablet, Take 40 mg by mouth daily, Disp: , Rfl:     pregabalin (LYRICA) 100 mg capsule, Take 100 mg by mouth 2 (two) times a day , Disp: , Rfl:     ranitidine (ZANTAC) 150 mg tablet, Take 1 tablet (150 mg total) by mouth daily at bedtime, Disp: , Rfl: 0    rosuvastatin (CRESTOR) 10 MG tablet, , Disp: , Rfl: 0    torsemide (DEMADEX) 10 mg tablet, Take 1 tablet (10 mg total) by mouth daily for 30 days, Disp: 30 tablet, Rfl: 0    ergocalciferol (ERGOCALCIFEROL) 1 25 MG (19459 UT) capsule, Take 1 capsule (50,000 Units total) by mouth once a week, Disp: 12 capsule, Rfl: 0    saccharomyces boulardii (FLORASTOR) 250 mg capsule, Take 1 capsule (250 mg total) by mouth 2 (two) times a day (Patient not taking: Reported on 11/12/2019), Disp: 10 capsule, Rfl: 0    sitaGLIPtin (JANUVIA) 50 mg tablet, Take 1 tablet (50 mg total) by mouth daily (Patient not taking: Reported on 9/10/2019), Disp: 30 tablet, Rfl: 0    Laboratory Results:        Invalid input(s): ALBUMIN    Results for orders placed or performed in visit on 10/18/19   CBC   Result Value Ref Range    WBC 6 62 4 31 - 10 16 Thousand/uL    RBC 4 57 3 81 - 5 12 Million/uL    Hemoglobin 13 1 11 5 - 15 4 g/dL    Hematocrit 41 8 34 8 - 46 1 %    MCV 92 82 - 98 fL    MCH 28 7 26 8 - 34 3 pg    MCHC 31 3 (L) 31 4 - 37 4 g/dL    RDW 12 6 11 6 - 15 1 %    Platelets 029 189 - 120 Thousands/uL    MPV 10 6 8 9 - 12 7 fL Magnesium   Result Value Ref Range    Magnesium 1 6 1 6 - 2 6 mg/dL   Renal function panel   Result Value Ref Range    Albumin 3 3 (L) 3 5 - 5 0 g/dL    Calcium 8 7 8 3 - 10 1 mg/dL    Phosphorus 3 8 2 3 - 4 1 mg/dL    BUN 19 5 - 25 mg/dL    Creatinine 1 10 0 60 - 1 30 mg/dL    Sodium 141 136 - 145 mmol/L    Potassium 4 0 3 5 - 5 3 mmol/L    Chloride 103 100 - 108 mmol/L    CO2 32 21 - 32 mmol/L    ANION GAP 6 4 - 13 mmol/L    eGFR 52 ml/min/1 73sq m    Glucose, Fasting 224 (H) 65 - 99 mg/dL   PTH, intact   Result Value Ref Range    PTH 32 2 18 4 - 80 1 pg/mL   Vitamin D 25 hydroxy   Result Value Ref Range    Vit D, 25-Hydroxy 18 4 (L) 30 0 - 100 0 ng/mL   UA (URINE) with reflex to Microscopic   Result Value Ref Range    Color, UA Light Yellow     Clarity, UA Clear     Specific [place], UA 1 010 1 000 - 1 030    pH, UA 5 0 5 0, 5 5, 6 0, 6 5, 7 0, 7 5, 8 0, 8 5, 9 0    Leukocytes, UA Negative Negative    Nitrite, UA Negative Negative    Protein, UA Negative Negative mg/dl    Glucose, UA Negative Negative mg/dl    Ketones, UA Negative Negative mg/dl    Urobilinogen, UA 0 2 0 2, 1 0 E U /dl E U /dl    Bilirubin, UA Negative Negative    Blood, UA Negative Negative

## 2019-11-12 ENCOUNTER — OFFICE VISIT (OUTPATIENT)
Dept: NEPHROLOGY | Facility: CLINIC | Age: 69
End: 2019-11-12
Payer: MEDICARE

## 2019-11-12 VITALS
WEIGHT: 249 LBS | HEIGHT: 62 IN | BODY MASS INDEX: 45.82 KG/M2 | DIASTOLIC BLOOD PRESSURE: 74 MMHG | HEART RATE: 74 BPM | SYSTOLIC BLOOD PRESSURE: 130 MMHG

## 2019-11-12 DIAGNOSIS — N18.30 CKD (CHRONIC KIDNEY DISEASE) STAGE 3, GFR 30-59 ML/MIN (HCC): ICD-10-CM

## 2019-11-12 DIAGNOSIS — I10 ESSENTIAL HYPERTENSION: ICD-10-CM

## 2019-11-12 DIAGNOSIS — D64.9 ANEMIA, UNSPECIFIED TYPE: Primary | ICD-10-CM

## 2019-11-12 DIAGNOSIS — E55.9 VITAMIN D DEFICIENCY: ICD-10-CM

## 2019-11-12 DIAGNOSIS — R60.0 LOWER LEG EDEMA: ICD-10-CM

## 2019-11-12 PROCEDURE — 99214 OFFICE O/P EST MOD 30 MIN: CPT | Performed by: NURSE PRACTITIONER

## 2019-11-12 RX ORDER — ERGOCALCIFEROL (VITAMIN D2) 1250 MCG
50000 CAPSULE ORAL WEEKLY
Qty: 12 CAPSULE | Refills: 0 | Status: SHIPPED | OUTPATIENT
Start: 2019-11-12 | End: 2022-02-17

## 2019-11-12 NOTE — PATIENT INSTRUCTIONS
1  Increase magnesium intake:  Dark chocolate, avocados, nuts, legumes ( lentils, black beans, chickpeas, peas)  2  No NSAIDS  3  No change in medications  4  Follow up in 3 months  5  Urine and blood studies before next appointment  6  Vitamin-D supplement weekly times 12 weeks      Hypomagnesemia   WHAT YOU NEED TO KNOW:   Hypomagnesemia is a condition that develops when the amount of magnesium in your body is too low  Magnesium is a mineral that helps your heart, muscles, and nerves work normally  It also helps strengthen your bones  DISCHARGE INSTRUCTIONS:   Return to the emergency department if:   · You have numbness and tingling in your arms or legs  · You have painful muscle spasms and tremors in your arms or legs  · You are not able to move your muscles, and you have trouble thinking clearly  · Your heartbeat is faster than usual, or is irregular  · You have a seizure  Contact your healthcare provider if:   · You have fatigue and muscle tremors or twitching  · You become irritable and have trouble sleeping  · You have questions or concerns about your condition or care  Prevent hypomagnesemia:   · Manage health conditions  by following your treatment plan  Health conditions such as congestive heart failure, diabetes, and chronic diarrhea can put you at risk for hypomagnesemia  · Eat foods that contain magnesium every day  Ask your dietitian or healthcare provider how much magnesium you need each day  · Limit or do not drink alcohol  Alcohol can prevent your body from absorbing magnesium  Alcohol also makes your body release large amounts of magnesium through your urine  · You may need to take a magnesium supplement  Ask your healthcare provider which supplement to take and how often to take it    Foods that contain magnesium:   · Almonds, cashews, peanuts, and peanut butter    · Dark green leafy vegetables, such as spinach    · Raisins, bananas, apples, broccoli, and carrots    · Soy milk and soy beans    · Black beans and kidney beans    · Whole-wheat bread and brown rice    · Shredded-wheat cereal, oatmeal, and other breakfast cereals fortified with magnesium    · Plain low-fat yogurt and milk    · Cooked halibut  Follow up with your healthcare provider or specialist as directed:  Write down your questions so you remember to ask them during your visits  © 2017 [employer] Information is for End User's use only and may not be sold, redistributed or otherwise used for commercial purposes  All illustrations and images included in CareNotes® are the copyrighted property of [employer]  or [employer]  The above information is an  only  It is not intended as medical advice for individual conditions or treatments  Talk to your doctor, nurse or pharmacist before following any medical regimen to see if it is safe and effective for you

## 2020-01-01 ENCOUNTER — HOSPITAL ENCOUNTER (EMERGENCY)
Facility: HOSPITAL | Age: 70
Discharge: HOME | End: 2020-01-01
Attending: EMERGENCY MEDICINE
Payer: MEDICARE

## 2020-01-01 ENCOUNTER — APPOINTMENT (EMERGENCY)
Dept: RADIOLOGY | Facility: HOSPITAL | Age: 70
End: 2020-01-01
Payer: MEDICARE

## 2020-01-01 VITALS
TEMPERATURE: 98.4 F | OXYGEN SATURATION: 98 % | WEIGHT: 249 LBS | DIASTOLIC BLOOD PRESSURE: 68 MMHG | SYSTOLIC BLOOD PRESSURE: 158 MMHG | BODY MASS INDEX: 45.54 KG/M2 | HEART RATE: 62 BPM | RESPIRATION RATE: 18 BRPM

## 2020-01-01 DIAGNOSIS — N39.0 UTI (URINARY TRACT INFECTION): Primary | ICD-10-CM

## 2020-01-01 LAB
ALBUMIN SERPL BCP-MCNC: 3.9 G/DL (ref 3.5–5)
ALP SERPL-CCNC: 72 U/L (ref 46–116)
ALT SERPL W P-5'-P-CCNC: 27 U/L (ref 12–78)
ANION GAP SERPL CALCULATED.3IONS-SCNC: 7 MMOL/L (ref 4–13)
AST SERPL W P-5'-P-CCNC: 15 U/L (ref 5–45)
BACTERIA UR QL AUTO: ABNORMAL /HPF
BASOPHILS # BLD AUTO: 0.11 THOUSANDS/ΜL (ref 0–0.1)
BASOPHILS NFR BLD AUTO: 1 % (ref 0–1)
BILIRUB SERPL-MCNC: 0.4 MG/DL (ref 0.2–1)
BILIRUB UR QL STRIP: NEGATIVE
BUN SERPL-MCNC: 21 MG/DL (ref 5–25)
CALCIUM SERPL-MCNC: 9.2 MG/DL (ref 8.3–10.1)
CHLORIDE SERPL-SCNC: 100 MMOL/L (ref 100–108)
CLARITY UR: CLEAR
CO2 SERPL-SCNC: 32 MMOL/L (ref 21–32)
COLOR UR: YELLOW
CREAT SERPL-MCNC: 1.24 MG/DL (ref 0.6–1.3)
EOSINOPHIL # BLD AUTO: 0.26 THOUSAND/ΜL (ref 0–0.61)
EOSINOPHIL NFR BLD AUTO: 2 % (ref 0–6)
ERYTHROCYTE [DISTWIDTH] IN BLOOD BY AUTOMATED COUNT: 13.2 % (ref 11.6–15.1)
GFR SERPL CREATININE-BSD FRML MDRD: 45 ML/MIN/1.73SQ M
GLUCOSE SERPL-MCNC: 142 MG/DL (ref 65–140)
GLUCOSE UR STRIP-MCNC: NEGATIVE MG/DL
HCT VFR BLD AUTO: 43.3 % (ref 34.8–46.1)
HGB BLD-MCNC: 13.9 G/DL (ref 11.5–15.4)
HGB UR QL STRIP.AUTO: ABNORMAL
HYALINE CASTS #/AREA URNS LPF: ABNORMAL /LPF
IMM GRANULOCYTES # BLD AUTO: 0.03 THOUSAND/UL (ref 0–0.2)
IMM GRANULOCYTES NFR BLD AUTO: 0 % (ref 0–2)
KETONES UR STRIP-MCNC: NEGATIVE MG/DL
LEUKOCYTE ESTERASE UR QL STRIP: ABNORMAL
LYMPHOCYTES # BLD AUTO: 3.7 THOUSANDS/ΜL (ref 0.6–4.47)
LYMPHOCYTES NFR BLD AUTO: 31 % (ref 14–44)
MCH RBC QN AUTO: 28.3 PG (ref 26.8–34.3)
MCHC RBC AUTO-ENTMCNC: 32.1 G/DL (ref 31.4–37.4)
MCV RBC AUTO: 88 FL (ref 82–98)
MONOCYTES # BLD AUTO: 1.06 THOUSAND/ΜL (ref 0.17–1.22)
MONOCYTES NFR BLD AUTO: 9 % (ref 4–12)
MUCOUS THREADS UR QL AUTO: ABNORMAL
NEUTROPHILS # BLD AUTO: 6.9 THOUSANDS/ΜL (ref 1.85–7.62)
NEUTS SEG NFR BLD AUTO: 57 % (ref 43–75)
NITRITE UR QL STRIP: NEGATIVE
NON-SQ EPI CELLS URNS QL MICRO: ABNORMAL /HPF
NRBC BLD AUTO-RTO: 0 /100 WBCS
PH UR STRIP.AUTO: 5 [PH]
PLATELET # BLD AUTO: 224 THOUSANDS/UL (ref 149–390)
PMV BLD AUTO: 10.3 FL (ref 8.9–12.7)
POTASSIUM SERPL-SCNC: 3.8 MMOL/L (ref 3.5–5.3)
PROT SERPL-MCNC: 8.2 G/DL (ref 6.4–8.2)
PROT UR STRIP-MCNC: ABNORMAL MG/DL
RBC # BLD AUTO: 4.92 MILLION/UL (ref 3.81–5.12)
RBC #/AREA URNS AUTO: ABNORMAL /HPF
SODIUM SERPL-SCNC: 139 MMOL/L (ref 136–145)
SP GR UR STRIP.AUTO: 1.02 (ref 1–1.03)
UROBILINOGEN UR QL STRIP.AUTO: 0.2 E.U./DL
WBC # BLD AUTO: 12.06 THOUSAND/UL (ref 4.31–10.16)
WBC #/AREA URNS AUTO: ABNORMAL /HPF

## 2020-01-01 PROCEDURE — 96374 THER/PROPH/DIAG INJ IV PUSH: CPT

## 2020-01-01 PROCEDURE — 99284 EMERGENCY DEPT VISIT MOD MDM: CPT | Performed by: EMERGENCY MEDICINE

## 2020-01-01 PROCEDURE — 74176 CT ABD & PELVIS W/O CONTRAST: CPT

## 2020-01-01 PROCEDURE — 99284 EMERGENCY DEPT VISIT MOD MDM: CPT

## 2020-01-01 PROCEDURE — 96361 HYDRATE IV INFUSION ADD-ON: CPT

## 2020-01-01 PROCEDURE — 81001 URINALYSIS AUTO W/SCOPE: CPT | Performed by: EMERGENCY MEDICINE

## 2020-01-01 PROCEDURE — 85025 COMPLETE CBC W/AUTO DIFF WBC: CPT | Performed by: EMERGENCY MEDICINE

## 2020-01-01 PROCEDURE — 36415 COLL VENOUS BLD VENIPUNCTURE: CPT | Performed by: EMERGENCY MEDICINE

## 2020-01-01 PROCEDURE — 87086 URINE CULTURE/COLONY COUNT: CPT | Performed by: EMERGENCY MEDICINE

## 2020-01-01 PROCEDURE — 80053 COMPREHEN METABOLIC PANEL: CPT | Performed by: EMERGENCY MEDICINE

## 2020-01-01 RX ORDER — CEPHALEXIN 500 MG/1
500 CAPSULE ORAL EVERY 6 HOURS SCHEDULED
Qty: 28 CAPSULE | Refills: 0 | Status: SHIPPED | OUTPATIENT
Start: 2020-01-01 | End: 2020-01-08

## 2020-01-01 RX ORDER — CEPHALEXIN 500 MG/1
500 CAPSULE ORAL ONCE
Status: COMPLETED | OUTPATIENT
Start: 2020-01-01 | End: 2020-01-01

## 2020-01-01 RX ORDER — ONDANSETRON 2 MG/ML
4 INJECTION INTRAMUSCULAR; INTRAVENOUS ONCE
Status: COMPLETED | OUTPATIENT
Start: 2020-01-01 | End: 2020-01-01

## 2020-01-01 RX ADMIN — SODIUM CHLORIDE 1000 ML: 0.9 INJECTION, SOLUTION INTRAVENOUS at 17:19

## 2020-01-01 RX ADMIN — ONDANSETRON 4 MG: 2 INJECTION INTRAMUSCULAR; INTRAVENOUS at 17:20

## 2020-01-01 RX ADMIN — CEPHALEXIN 500 MG: 500 CAPSULE ORAL at 18:22

## 2020-01-01 NOTE — ED PROVIDER NOTES
History  Chief Complaint   Patient presents with    Back Pain     right sided back pain  states she has a hx  of kidney stones  33-year-old female with history of kidney stones in the past states she started with right flank pain feels that she has another kidney stone  Initially she thought it might have been a muscle spasm however she has developed nausea since that time  No fevers chills no vomiting or diarrhea          Prior to Admission Medications   Prescriptions Last Dose Informant Patient Reported? Taking?    Cyanocobalamin 1000 MCG SUBL  Self No No   Sig: Place 1 tablet (1,000 mcg total) under the tongue daily   Insulin Glargine (TOUJEO SOLOSTAR) injection pen 300 units/mL  Self Yes No   Sig: Inject 30 Units under the skin 2 (two) times a day    NOVOFINE AUTOCOVER 30G X 8 MM MISC  Self Yes No   albuterol (PROVENTIL HFA,VENTOLIN HFA) 90 mcg/act inhaler  Self Yes No   Sig: Inhale 2 puffs every 6 (six) hours as needed for wheezing   amLODIPine (NORVASC) 5 mg tablet  Self No No   Sig: Take 1 tablet (5 mg total) by mouth daily   apixaban (ELIQUIS) 5 mg  Self No No   Sig: Take 1 tablet (5 mg total) by mouth 2 (two) times a day   desloratadine (CLARINEX) 5 MG tablet  Self Yes No   Sig: Take by mouth   ergocalciferol (ERGOCALCIFEROL) 1 25 MG (11578 UT) capsule   No No   Sig: Take 1 capsule (50,000 Units total) by mouth once a week   insulin lispro (HUMALOG) 100 units/mL injection  Self Yes No   Sig: Inject under the skin 3 (three) times a day before meals Inject 5 units subq every 8 hours before meals if fasting BS>125   metoprolol tartrate (LOPRESSOR) 25 mg tablet  Self No No   Sig: Take 2 tablets (50 mg total) by mouth every 12 (twelve) hours   montelukast (SINGULAIR) 10 mg tablet  Self Yes No   Sig: Take 10 mg by mouth daily     pantoprazole (PROTONIX) 40 mg tablet  Self Yes No   Sig: Take 40 mg by mouth daily   pregabalin (LYRICA) 100 mg capsule  Self Yes No   Sig: Take 100 mg by mouth 2 (two) times a day    ranitidine (ZANTAC) 150 mg tablet  Self No No   Sig: Take 1 tablet (150 mg total) by mouth daily at bedtime   rosuvastatin (CRESTOR) 10 MG tablet  Self Yes No   saccharomyces boulardii (FLORASTOR) 250 mg capsule Not Taking at Unknown time Self No No   Sig: Take 1 capsule (250 mg total) by mouth 2 (two) times a day   Patient not taking: Reported on 11/12/2019   sitaGLIPtin (JANUVIA) 50 mg tablet Not Taking at Unknown time Self No No   Sig: Take 1 tablet (50 mg total) by mouth daily   Patient not taking: Reported on 9/10/2019   torsemide (DEMADEX) 10 mg tablet 1/1/2020 at Unknown time Self No Yes   Sig: Take 1 tablet (10 mg total) by mouth daily for 30 days   Patient taking differently: Take 5 mg by mouth daily       Facility-Administered Medications: None       Past Medical History:   Diagnosis Date    A-fib ([facility] )     Asthma     CKD (chronic kidney disease)     Diabetes mellitus ([facility] )     GERD (gastroesophageal reflux disease)     Hyperlipidemia     Hypertension     Kidney stones     PONV (postoperative nausea and vomiting)     SVT [facility]supraventricular tachycardia) ([facility])        Past Surgical History:   Procedure Laterality Date    APPENDECTOMY      CYSTOSCOPY W/ LASER LITHOTRIPSY Left 7/12/2016    Procedure: CYSTOSCOPY URETEROSCOPY WITH LITHOTRIPSY HOLMIUM LASER, RETROGRADE PYELOGRAM AND INSERTION STENT URETERAL;  Surgeon: Myranda Sevilla MD;  Location: [facility];  Service:     DILATION AND CURETTAGE OF UTERUS      JOINT REPLACEMENT      right knee    KNEE ARTHROPLASTY Right     [place] CYSTOURETHROSCOPY,URETER CATHETER Left 6/29/2016    Procedure: CYSTOSCOPY RETROGRADE PYELOGRAM WITH INSERTION STENT URETERAL, left;  Surgeon: Myranda Sevilla MD;  Location: [facility];  Service: Urology    SHOULDER ARTHROTOMY Left        Family History   Problem Relation Age of Onset    Heart disease Mother     Emphysema Maternal Grandmother     Heart disease Family      I have reviewed and agree with the history as documented  Social History     Tobacco Use    Smoking status: Former Smoker     Packs/day: 1 00     Years: 20 00     Pack years: 20 00     Types: Cigarettes     Last attempt to quit: 1996     Years since quittin 4    Smokeless tobacco: Never Used   Substance Use Topics    Alcohol use: Not Currently     Frequency: Monthly or less     Drinks per session: 1 or 2     Binge frequency: Never     Comment: rarely    Drug use: No        Review of Systems   Constitutional: Negative for activity change, chills, diaphoresis and fever  HENT: Negative for congestion, ear pain, nosebleeds, sore throat, trouble swallowing and voice change  Dental problem: ood noticed in her urine  Eyes: Negative for pain, discharge and redness  Respiratory: Negative for apnea, cough, choking, shortness of breath, wheezing and stridor  Cardiovascular: Negative for chest pain and palpitations  Gastrointestinal: Negative for abdominal distention, abdominal pain, constipation, diarrhea, nausea and vomiting  Endocrine: Negative for polydipsia  Genitourinary: Positive for flank pain  Negative for difficulty urinating, dysuria, frequency, hematuria and urgency  Musculoskeletal: Negative for back pain, gait problem, joint swelling, myalgias, neck pain and neck stiffness  Skin: Negative for pallor and rash  Neurological: Negative for dizziness, tremors, syncope, speech difficulty, weakness, numbness and headaches  Hematological: Negative for adenopathy  Psychiatric/Behavioral: Negative for confusion, hallucinations, self-injury and suicidal ideas  The patient is not nervous/anxious  Physical Exam  Physical Exam   Constitutional: She is oriented to person, place, and time  Vital signs are normal  She appears well-developed and well-nourished  No distress  HENT:   Head: Normocephalic and atraumatic     Right Ear: External ear normal    Left Ear: External ear normal    Nose: Nose normal  Mouth/Throat: Oropharynx is clear and moist    Eyes: Pupils are equal, round, and reactive to light  Conjunctivae are normal    Neck: Normal range of motion  Neck supple  Cardiovascular: Normal rate, regular rhythm, normal heart sounds and intact distal pulses  Pulmonary/Chest: Effort normal and breath sounds normal    Abdominal: Soft  Bowel sounds are normal    Musculoskeletal: Normal range of motion  Neurological: She is alert and oriented to person, place, and time  She has normal reflexes  Skin: Skin is warm and dry  She is not diaphoretic  Psychiatric: She has a normal mood and affect  Nursing note and vitals reviewed        Vital Signs  ED Triage Vitals [01/01/20 1645]   Temperature Pulse Respirations Blood Pressure SpO2   98 4 °F (36 9 °C) 69 20 (!) 179/78 97 %      Temp src Heart Rate Source Patient Position - Orthostatic VS BP Location FiO2 (%)   -- -- -- -- --      Pain Score       8           Vitals:    01/01/20 1645   BP: (!) 179/78   Pulse: 69         Visual Acuity      ED Medications  Medications   cephalexin (KEFLEX) capsule 500 mg (has no administration in time range)   sodium chloride 0 9 % bolus 1,000 mL (0 mL Intravenous Stopped 1/1/20 1821)   ondansetron (ZOFRAN) injection 4 mg (4 mg Intravenous Given 1/1/20 1720)       Diagnostic Studies  Results Reviewed     Procedure Component Value Units Date/Time    Comprehensive metabolic panel [[id number]]  (Abnormal) Collected:  01/01/20 1715    Lab Status:  Final result Specimen:  Blood from Arm, Left Updated:  01/01/20 1742     Sodium 139 mmol/L      Potassium 3 8 mmol/L      Chloride 100 mmol/L      CO2 32 mmol/L      ANION GAP 7 mmol/L      BUN 21 mg/dL      Creatinine 1 24 mg/dL      Glucose 142 mg/dL      Calcium 9 2 mg/dL      AST 15 U/L      ALT 27 U/L      Alkaline Phosphatase 72 U/L      Total Protein 8 2 g/dL      Albumin 3 9 g/dL      Total Bilirubin 0 40 mg/dL      eGFR 45 ml/min/1 73sq m     Narrative:       National Kidney Disease Foundation guidelines for Chronic Kidney Disease (CKD):     Stage 1 with normal or high GFR (GFR > 90 mL/min/1 73 square meters)    Stage 2 Mild CKD (GFR = 60-89 mL/min/1 73 square meters)    Stage 3A Moderate CKD (GFR = 45-59 mL/min/1 73 square meters)    Stage 3B Moderate CKD (GFR = 30-44 mL/min/1 73 square meters)    Stage 4 Severe CKD (GFR = 15-29 mL/min/1 73 square meters)    Stage 5 End Stage CKD (GFR <15 mL/min/1 73 square meters)  Note: GFR calculation is accurate only with a steady state creatinine    Urine Microscopic [[id number]]  (Abnormal) Collected:  01/01/20 1715    Lab Status:  Final result Specimen:  Urine, Clean Catch Updated:  01/01/20 1736     RBC, UA 0-1 /hpf      WBC, UA 20-30 /hpf      Epithelial Cells Occasional /hpf      Bacteria, UA Occasional /hpf      Hyaline Casts, UA 2-4 /lpf      MUCUS THREADS Occasional    UA (URINE) with reflex to Scope [[id number]]  (Abnormal) Collected:  01/01/20 1715    Lab Status:  Final result Specimen:  Urine, Clean Catch Updated:  01/01/20 1725     Color, UA Yellow     Clarity, UA Clear     Specific [place], UA 1 020     pH, UA 5 0     Leukocytes, UA Trace     Nitrite, UA Negative     Protein, UA 30 (1+) mg/dl      Glucose, UA Negative mg/dl      Ketones, UA Negative mg/dl      Urobilinogen, UA 0 2 E U /dl      Bilirubin, UA Negative     Blood, UA Trace-Intact    CBC and differential [[id number]]  (Abnormal) Collected:  01/01/20 1715    Lab Status:  Final result Specimen:  Blood from Arm, Left Updated:  01/01/20 1721     WBC 12 06 Thousand/uL      RBC 4 92 Million/uL      Hemoglobin 13 9 g/dL      Hematocrit 43 3 %      MCV 88 fL      MCH 28 3 pg      MCHC 32 1 g/dL      RDW 13 2 %      MPV 10 3 fL      Platelets 888 Thousands/uL      nRBC 0 /100 WBCs      Neutrophils Relative 57 %      Immat GRANS % 0 %      Lymphocytes Relative 31 %      Monocytes Relative 9 %      Eosinophils Relative 2 %      Basophils Relative 1 %      Neutrophils Absolute 6 90 Thousands/µL      Immature Grans Absolute 0 03 Thousand/uL      Lymphocytes Absolute 3 70 Thousands/µL      Monocytes Absolute 1 06 Thousand/µL      Eosinophils Absolute 0 26 Thousand/µL      Basophils Absolute 0 11 Thousands/µL     Urine culture [[id number]] Collected:  01/01/20 1715    Lab Status: In process Specimen:  Urine, Clean Catch Updated:  01/01/20 1718                 CT renal stone study abdomen pelvis without contrast   Final Result by Des Wheeler MD (01/01 1756)      Study negative for urinary tract calculi or hydronephrosis  Small irregular left breast nodule  Suggest diagnostic mammogram and ultrasound on a nonemergent basis  Small fat-containing umbilical hernia  The study was marked in EPIC for significant notification  Workstation performed: [id number]                    Procedures  Procedures         ED Course                               MDM  Number of Diagnoses or Management Options  UTI (urinary tract infection):   Diagnosis management comments: I also made the patient aware of incidental finding of nodule in the left breast for which they recommend routine mammogram         Disposition  Final diagnoses:   UTI (urinary tract infection)     Time reflects when diagnosis was documented in both MDM as applicable and the Disposition within this note     Time User Action Codes Description Comment    1/1/2020  6:17 PM Cynthia MORENO Add [N39 0] UTI (urinary tract infection)       ED Disposition     ED Disposition Condition Date/Time Comment    Discharge Stable Wed Jan 1, 2020  6:17 PM Thea Dean discharge to home/self care              Follow-up Information     Follow up With Specialties Details Why Contact Attila Velasquez  Schedule an appointment as soon as possible for a visit  As needed [address] [facility]  [phone]            Patient's Medications   Discharge Prescriptions    CEPHALEXIN (KEFLEX) 500 MG CAPSULE    Take 1 capsule (500 mg total) by mouth every 6 (six) hours for 7 days       Start Date: 1/1/2020  End Date: 1/8/2020       Order Dose: 500 mg       Quantity: 28 capsule    Refills: 0     No discharge procedures on file      ED Provider  Electronically Signed by           Tiffany Vance DO  01/01/20 Alex Divers

## 2020-01-02 LAB — BACTERIA UR CULT: NORMAL

## 2020-01-21 LAB
CREAT ?TM UR-SCNC: 101 UMOL/L
EXT MICROALBUMIN URINE RANDOM: 27.9
HBA1C MFR BLD HPLC: 10.3 %
MICROALBUMIN/CREAT UR: 276.2 MG/G{CREAT}

## 2021-12-28 ENCOUNTER — OFFICE VISIT (OUTPATIENT)
Dept: URGENT CARE | Facility: CLINIC | Age: 71
End: 2021-12-28
Payer: MEDICARE

## 2021-12-28 VITALS
WEIGHT: 265 LBS | RESPIRATION RATE: 16 BRPM | OXYGEN SATURATION: 98 % | BODY MASS INDEX: 48.76 KG/M2 | TEMPERATURE: 97.4 F | HEART RATE: 93 BPM | HEIGHT: 62 IN

## 2021-12-28 DIAGNOSIS — B34.9 ACUTE VIRAL DISEASE: Primary | ICD-10-CM

## 2021-12-28 DIAGNOSIS — R05.9 COUGH: ICD-10-CM

## 2021-12-28 PROCEDURE — 87636 SARSCOV2 & INF A&B AMP PRB: CPT | Performed by: PHYSICIAN ASSISTANT

## 2021-12-28 PROCEDURE — 99213 OFFICE O/P EST LOW 20 MIN: CPT | Performed by: PHYSICIAN ASSISTANT

## 2021-12-28 RX ORDER — LOSARTAN POTASSIUM 25 MG/1
25 TABLET ORAL DAILY
COMMUNITY
Start: 2021-10-11 | End: 2022-02-17

## 2021-12-28 RX ORDER — DULAGLUTIDE 1.5 MG/.5ML
INJECTION, SOLUTION SUBCUTANEOUS
COMMUNITY
Start: 2021-12-16

## 2021-12-28 RX ORDER — CODEINE PHOSPHATE AND GUAIFENESIN 10; 100 MG/5ML; MG/5ML
SOLUTION ORAL
COMMUNITY
Start: 2021-10-06 | End: 2022-02-17

## 2021-12-28 RX ORDER — AMMONIUM LACTATE 12 G/100G
LOTION TOPICAL
COMMUNITY
Start: 2021-12-16

## 2021-12-28 RX ORDER — CEFUROXIME AXETIL 500 MG/1
500 TABLET ORAL 2 TIMES DAILY
COMMUNITY
Start: 2021-10-05 | End: 2022-02-17

## 2021-12-28 NOTE — PROGRESS NOTES
[facility]        NAME: Faheem Lemus is a 73 y o  female  : 1950    MRN: [id number]  DATE: 2021  TIME: 5:29 PM    Assessment and Plan   Acute viral disease [B34 9]  1  Acute viral disease     2  Cough  COVID/FLU- Office Collect     Patient Instructions   Acute viral disease  covid vs influenza, combo swab done, mychart for results  Self isolation until results received  Advised patient may be too early for swab and results may not be accurate, pt wants to be swabbed anyway  Recommend tylenol/ibuprofen as needed for pain/fever  Rest, fluids, and supportive care  Follow up with PCP in 3-5 days  Proceed to  ER if symptoms worsen  Chief Complaint   No chief complaint on file  History of Present Illness       Lupe Monroy is a 80-year-old female who presents to clinic complaining of sore throat and fever x2 days  She states this T-max was 99 1° F  She is also complaining of cough, chills, headache, right ear pain, fatigue, and myalgias  She describes the cough is dry and nonproductive and worse when she lays down  She denies any shortness of breath, nasal congestion, nausea, vomiting, diarrhea, loss of taste or smell, recent travel, or exposure to anyone known COVID positive  Review of Systems   Review of Systems   Constitutional: Positive for chills, fatigue and fever  HENT: Positive for ear pain and sore throat  Negative for congestion, sinus pressure and sinus pain  Respiratory: Positive for cough  Negative for shortness of breath  Gastrointestinal: Negative for diarrhea, nausea and vomiting  Musculoskeletal: Positive for myalgias  Neurological: Positive for headaches           Current Medications       Current Outpatient Medications:     albuterol (PROVENTIL HFA,VENTOLIN HFA) 90 mcg/act inhaler, Inhale 2 puffs every 6 (six) hours as needed for wheezing, Disp: , Rfl:     amLODIPine (NORVASC) 5 mg tablet, Take 1 tablet (5 mg total) by mouth daily, Disp: 30 tablet, Rfl: 0    ammonium lactate (LAC-HYDRIN) 12 % lotion, APPLY TOPICALLY TO AFFECTED AREAS twice a day, Disp: , Rfl:     apixaban (ELIQUIS) 5 mg, Take 1 tablet (5 mg total) by mouth 2 (two) times a day, Disp: 60 tablet, Rfl: 0    cefuroxime (CEFTIN) 500 mg tablet, Take 500 mg by mouth 2 (two) times a day, Disp: , Rfl:     desloratadine (CLARINEX) 5 MG tablet, Take by mouth, Disp: , Rfl:     Insulin Glargine (TOUJEO SOLOSTAR) injection pen 300 units/mL, Inject 30 Units under the skin 2 (two) times a day , Disp: , Rfl:     insulin lispro (HUMALOG) 100 units/mL injection, Inject under the skin 3 (three) times a day before meals Inject 5 units subq every 8 hours before meals if fasting BS>125, Disp: , Rfl:     metoprolol tartrate (LOPRESSOR) 25 mg tablet, Take 2 tablets (50 mg total) by mouth every 12 (twelve) hours, Disp: 180 tablet, Rfl: 3    pregabalin (LYRICA) 100 mg capsule, Take 100 mg by mouth 2 (two) times a day , Disp: , Rfl:     rosuvastatin (CRESTOR) 10 MG tablet, , Disp: , Rfl: 0    Trulicity 1 5 ZL/4 2BS SOPN, inject 0 5 milliliter subcutaneously every week 28, Disp: , Rfl:     Cyanocobalamin 1000 MCG SUBL, Place 1 tablet (1,000 mcg total) under the tongue daily (Patient not taking: Reported on 12/28/2021 ), Disp: 60 tablet, Rfl: 0    ergocalciferol (ERGOCALCIFEROL) 1 25 MG (28758 UT) capsule, Take 1 capsule (50,000 Units total) by mouth once a week (Patient not taking: Reported on 12/28/2021 ), Disp: 12 capsule, Rfl: 0    guaiFENesin-codeine (ROBITUSSIN AC) 100-10 mg/5 mL oral solution, take 5 milliliters by mouth every 6 hours for 10 days (Patient not taking: Reported on 12/28/2021), Disp: , Rfl:     losartan (COZAAR) 25 mg tablet, Take 25 mg by mouth daily (Patient not taking: Reported on 12/28/2021 ), Disp: , Rfl:     montelukast (SINGULAIR) 10 mg tablet, Take 10 mg by mouth daily   (Patient not taking: Reported on 12/28/2021 ), Disp: , Rfl:     NOVOFINE AUTOCOVER 30G X 8 MM MISC, , Disp: , Rfl: 0    pantoprazole (PROTONIX) 40 mg tablet, Take 40 mg by mouth daily (Patient not taking: Reported on 12/28/2021 ), Disp: , Rfl:     ranitidine (ZANTAC) 150 mg tablet, Take 1 tablet (150 mg total) by mouth daily at bedtime (Patient not taking: Reported on 12/28/2021 ), Disp: , Rfl: 0    saccharomyces boulardii (FLORASTOR) 250 mg capsule, Take 1 capsule (250 mg total) by mouth 2 (two) times a day (Patient not taking: Reported on 11/12/2019), Disp: 10 capsule, Rfl: 0    sitaGLIPtin (JANUVIA) 50 mg tablet, Take 1 tablet (50 mg total) by mouth daily (Patient not taking: Reported on 9/10/2019), Disp: 30 tablet, Rfl: 0    torsemide (DEMADEX) 10 mg tablet, Take 1 tablet (10 mg total) by mouth daily for 30 days (Patient taking differently: Take 5 mg by mouth daily ), Disp: 30 tablet, Rfl: 0    Current Allergies     Allergies as of 12/28/2021 - Reviewed 12/28/2021   Allergen Reaction Noted    Asa [aspirin] GI Intolerance 06/29/2016    Indocin [indomethacin] Other (See Comments) 06/29/2016    Penicillins Hives 06/29/2016    Percocet [oxycodone-acetaminophen]  07/15/2018            The following portions of the patient's history were reviewed and updated as appropriate: allergies, current medications, past family history, past medical history, past social history, past surgical history and problem list      Past Medical History:   Diagnosis Date    [facility]     Asthma     CKD (chronic kidney disease)     [facility] mellitus ([facility] )     GERD (gastroesophageal reflux disease)     Hyperlipidemia     Hypertension     Kidney stones     PONV (postoperative nausea and vomiting)     SVT (supraventricular tachycardia) ([facility] )        Past Surgical History:   Procedure Laterality Date    APPENDECTOMY      CYSTOSCOPY W/ LASER LITHOTRIPSY Left 7/12/2016    Procedure: CYSTOSCOPY URETEROSCOPY WITH LITHOTRIPSY HOLMIUM LASER, RETROGRADE PYELOGRAM AND INSERTION STENT URETERAL;  Surgeon: Ryley Church MD; Location: [facility];  Service:     DILATION AND CURETTAGE OF UTERUS      JOINT REPLACEMENT      right knee    KNEE ARTHROPLASTY Right     [place] CYSTOURETHROSCOPY,URETER CATHETER Left 6/29/2016    Procedure: CYSTOSCOPY RETROGRADE PYELOGRAM WITH INSERTION STENT URETERAL, left;  Surgeon: Yo Dejesus MD;  Location: [facility];  Service: Urology    SHOULDER ARTHROTOMY Left        Family History   Problem Relation Age of Onset    Heart disease Mother     Emphysema Maternal Grandmother     Heart disease Family          Medications have been verified  Objective   Pulse 93   Temp (!) 97 4 °F (36 3 °C)   Resp 16   Ht 5' 2" (1 575 m)   Wt 120 kg (265 lb)   SpO2 98%   BMI 48 47 kg/m²   No LMP recorded  Patient is postmenopausal        Physical Exam     Physical Exam  Vitals and nursing note reviewed  Constitutional:       General: She is not in acute distress  Appearance: Normal appearance  She is not ill-appearing  HENT:      Right Ear: Tympanic membrane, ear canal and external ear normal       Left Ear: Tympanic membrane, ear canal and external ear normal       Nose: Nose normal       Mouth/Throat:      Mouth: Mucous membranes are moist       Pharynx: Posterior oropharyngeal erythema present  No oropharyngeal exudate  Cardiovascular:      Rate and Rhythm: Normal rate and regular rhythm  Heart sounds: Normal heart sounds  Pulmonary:      Effort: Pulmonary effort is normal       Breath sounds: Normal breath sounds  Lymphadenopathy:      Cervical: No cervical adenopathy  Neurological:      Mental Status: She is alert and oriented to person, place, and time     Psychiatric:         Mood and Affect: Mood normal          Behavior: Behavior normal

## 2021-12-28 NOTE — PATIENT INSTRUCTIONS
Acute viral disease  covid vs influenza, combo swab done, mychart for results  Self isolation until results received  Advised patient may be too early for swab and results may not be accurate, pt wants to be swabbed anyway  Recommend tylenol/ibuprofen as needed for pain/fever  Rest, fluids, and supportive care  Follow up with PCP in 3-5 days  Proceed to  ER if symptoms worsen

## 2022-01-01 DIAGNOSIS — S42.295D OTHER CLOSED NONDISPLACED FRACTURE OF PROXIMAL END OF LEFT HUMERUS WITH ROUTINE HEALING, SUBSEQUENT ENCOUNTER: Primary | ICD-10-CM

## 2022-01-01 DIAGNOSIS — M85.812 OTHER SPECIFIED DISORDERS OF BONE DENSITY AND STRUCTURE, LEFT SHOULDER: ICD-10-CM

## 2022-01-01 LAB
FLUAV RNA RESP QL NAA+PROBE: NEGATIVE
FLUBV RNA RESP QL NAA+PROBE: NEGATIVE
SARS-COV-2 RNA RESP QL NAA+PROBE: POSITIVE

## 2022-01-03 ENCOUNTER — TELEPHONE (OUTPATIENT)
Dept: URGENT CARE | Facility: CLINIC | Age: 72
End: 2022-01-03

## 2022-01-03 NOTE — TELEPHONE ENCOUNTER
Spoke with patient, aware of results, feels congested but otherwise ok, fever resolved  Make sure PCP aware and have f/u  Can break quarantine 7-10 days from symptom start and fever free for 24hrs without medication

## 2022-02-16 ENCOUNTER — HOSPITAL ENCOUNTER (INPATIENT)
Facility: HOSPITAL | Age: 72
LOS: 3 days | Discharge: NURSING, REHAB OR LONG-TERM CARE | DRG: 563 | End: 2022-02-20
Attending: EMERGENCY MEDICINE | Admitting: STUDENT IN AN ORGANIZED HEALTH CARE EDUCATION/TRAINING PROGRAM
Payer: MEDICARE

## 2022-02-16 ENCOUNTER — APPOINTMENT (EMERGENCY)
Dept: RADIOLOGY | Facility: HOSPITAL | Age: 72
DRG: 563 | End: 2022-02-16
Payer: MEDICARE

## 2022-02-16 DIAGNOSIS — S42.292A CLOSED FRACTURE OF HEAD OF LEFT HUMERUS, INITIAL ENCOUNTER: ICD-10-CM
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
## 2022-02-16 DIAGNOSIS — S42.302A: ICD-10-CM

## 2022-02-16 LAB
APTT PPP: 31 SECONDS (ref 23–37)
BASOPHILS # BLD AUTO: 0.12 THOUSANDS/ΜL (ref 0–0.1)
BASOPHILS NFR BLD AUTO: 1 % (ref 0–1)
EOSINOPHIL # BLD AUTO: 0.16 THOUSAND/ΜL (ref 0–0.61)
EOSINOPHIL NFR BLD AUTO: 1 % (ref 0–6)
ERYTHROCYTE [DISTWIDTH] IN BLOOD BY AUTOMATED COUNT: 15.1 % (ref 11.6–15.1)
HCT VFR BLD AUTO: 36.7 % (ref 34.8–46.1)
HGB BLD-MCNC: 11.7 G/DL (ref 11.5–15.4)
IMM GRANULOCYTES # BLD AUTO: 0.09 THOUSAND/UL (ref 0–0.2)
IMM GRANULOCYTES NFR BLD AUTO: 1 % (ref 0–2)
INR PPP: 1.13 (ref 0.84–1.19)
LYMPHOCYTES # BLD AUTO: 2.5 THOUSANDS/ΜL (ref 0.6–4.47)
LYMPHOCYTES NFR BLD AUTO: 21 % (ref 14–44)
MCH RBC QN AUTO: 29.3 PG (ref 26.8–34.3)
MCHC RBC AUTO-ENTMCNC: 31.9 G/DL (ref 31.4–37.4)
MCV RBC AUTO: 92 FL (ref 82–98)
MONOCYTES # BLD AUTO: 0.99 THOUSAND/ΜL (ref 0.17–1.22)
MONOCYTES NFR BLD AUTO: 8 % (ref 4–12)
NEUTROPHILS # BLD AUTO: 8.06 THOUSANDS/ΜL (ref 1.85–7.62)
NEUTS SEG NFR BLD AUTO: 68 % (ref 43–75)
NRBC BLD AUTO-RTO: 0 /100 WBCS
PLATELET # BLD AUTO: 188 THOUSANDS/UL (ref 149–390)
PMV BLD AUTO: 11.9 FL (ref 8.9–12.7)
PROTHROMBIN TIME: 14.3 SECONDS (ref 11.6–14.5)
RBC # BLD AUTO: 4 MILLION/UL (ref 3.81–5.12)
WBC # BLD AUTO: 11.92 THOUSAND/UL (ref 4.31–10.16)

## 2022-02-16 PROCEDURE — 1124F ACP DISCUSS-NO DSCNMKR DOCD: CPT | Performed by: EMERGENCY MEDICINE

## 2022-02-16 PROCEDURE — 85730 THROMBOPLASTIN TIME PARTIAL: CPT | Performed by: EMERGENCY MEDICINE

## 2022-02-16 PROCEDURE — 96374 THER/PROPH/DIAG INJ IV PUSH: CPT

## 2022-02-16 PROCEDURE — 73010 X-RAY EXAM OF SHOULDER BLADE: CPT

## 2022-02-16 PROCEDURE — 99285 EMERGENCY DEPT VISIT HI MDM: CPT

## 2022-02-16 PROCEDURE — 85610 PROTHROMBIN TIME: CPT | Performed by: EMERGENCY MEDICINE

## 2022-02-16 PROCEDURE — 96375 TX/PRO/DX INJ NEW DRUG ADDON: CPT

## 2022-02-16 PROCEDURE — 85025 COMPLETE CBC W/AUTO DIFF WBC: CPT | Performed by: EMERGENCY MEDICINE

## 2022-02-16 PROCEDURE — 36415 COLL VENOUS BLD VENIPUNCTURE: CPT | Performed by: EMERGENCY MEDICINE

## 2022-02-16 PROCEDURE — 80053 COMPREHEN METABOLIC PANEL: CPT | Performed by: EMERGENCY MEDICINE

## 2022-02-16 PROCEDURE — 73030 X-RAY EXAM OF SHOULDER: CPT

## 2022-02-16 PROCEDURE — G1004 CDSM NDSC: HCPCS

## 2022-02-16 PROCEDURE — 96376 TX/PRO/DX INJ SAME DRUG ADON: CPT

## 2022-02-16 PROCEDURE — 99285 EMERGENCY DEPT VISIT HI MDM: CPT | Performed by: EMERGENCY MEDICINE

## 2022-02-16 PROCEDURE — 73200 CT UPPER EXTREMITY W/O DYE: CPT

## 2022-02-16 RX ORDER — HYDROMORPHONE HCL/PF 1 MG/ML
0.5 SYRINGE (ML) INJECTION ONCE
Status: COMPLETED | OUTPATIENT
Start: 2022-02-16 | End: 2022-02-16

## 2022-02-16 RX ORDER — ONDANSETRON 2 MG/ML
4 INJECTION INTRAMUSCULAR; INTRAVENOUS ONCE
Status: COMPLETED | OUTPATIENT
Start: 2022-02-16 | End: 2022-02-16

## 2022-02-16 RX ORDER — HYDROCODONE BITARTRATE AND ACETAMINOPHEN 5; 325 MG/1; MG/1
1 TABLET ORAL EVERY 6 HOURS PRN
Qty: 15 TABLET | Refills: 0 | Status: SHIPPED | OUTPATIENT
Start: 2022-02-16 | End: 2022-02-20 | Stop reason: HOSPADM

## 2022-02-16 RX ADMIN — ONDANSETRON 4 MG: 2 INJECTION INTRAMUSCULAR; INTRAVENOUS at 23:40

## 2022-02-16 RX ADMIN — HYDROMORPHONE HYDROCHLORIDE 0.5 MG: 1 INJECTION, SOLUTION INTRAMUSCULAR; INTRAVENOUS; SUBCUTANEOUS at 23:40

## 2022-02-16 RX ADMIN — HYDROMORPHONE HYDROCHLORIDE 0.5 MG: 1 INJECTION, SOLUTION INTRAMUSCULAR; INTRAVENOUS; SUBCUTANEOUS at 21:56

## 2022-02-17 ENCOUNTER — APPOINTMENT (OUTPATIENT)
Dept: RADIOLOGY | Facility: HOSPITAL | Age: 72
DRG: 563 | End: 2022-02-17
Payer: MEDICARE

## 2022-02-17 PROBLEM — S42.293A HUMERAL HEAD FRACTURE: Status: ACTIVE | Noted: 2022-02-17

## 2022-02-17 PROBLEM — E11.9 DIABETES MELLITUS TYPE 2 IN NONOBESE (HCC): Status: ACTIVE | Noted: 2018-03-08

## 2022-02-17 LAB
ALBUMIN SERPL BCP-MCNC: 3.2 G/DL (ref 3.5–5)
ALP SERPL-CCNC: 74 U/L (ref 46–116)
ALT SERPL W P-5'-P-CCNC: 27 U/L (ref 12–78)
ANION GAP SERPL CALCULATED.3IONS-SCNC: 10 MMOL/L (ref 4–13)
AST SERPL W P-5'-P-CCNC: 17 U/L (ref 5–45)
BILIRUB SERPL-MCNC: 0.38 MG/DL (ref 0.2–1)
BUN SERPL-MCNC: 39 MG/DL (ref 5–25)
CALCIUM ALBUM COR SERPL-MCNC: 8.7 MG/DL (ref 8.3–10.1)
CALCIUM SERPL-MCNC: 8.1 MG/DL (ref 8.3–10.1)
CHLORIDE SERPL-SCNC: 103 MMOL/L (ref 100–108)
CO2 SERPL-SCNC: 29 MMOL/L (ref 21–32)
CREAT SERPL-MCNC: 1.52 MG/DL (ref 0.6–1.3)
GFR SERPL CREATININE-BSD FRML MDRD: 34 ML/MIN/1.73SQ M
GLUCOSE SERPL-MCNC: 305 MG/DL (ref 65–140)
GLUCOSE SERPL-MCNC: 331 MG/DL (ref 65–140)
GLUCOSE SERPL-MCNC: 430 MG/DL (ref 65–140)
GLUCOSE SERPL-MCNC: 442 MG/DL (ref 65–140)
GLUCOSE SERPL-MCNC: 469 MG/DL (ref 65–140)
POTASSIUM SERPL-SCNC: 4.8 MMOL/L (ref 3.5–5.3)
PROT SERPL-MCNC: 6.9 G/DL (ref 6.4–8.2)
SODIUM SERPL-SCNC: 142 MMOL/L (ref 136–145)

## 2022-02-17 PROCEDURE — 70450 CT HEAD/BRAIN W/O DYE: CPT

## 2022-02-17 PROCEDURE — 82948 REAGENT STRIP/BLOOD GLUCOSE: CPT

## 2022-02-17 PROCEDURE — 96376 TX/PRO/DX INJ SAME DRUG ADON: CPT

## 2022-02-17 PROCEDURE — 99220 PR INITIAL OBSERVATION CARE/DAY 70 MINUTES: CPT | Performed by: NURSE PRACTITIONER

## 2022-02-17 PROCEDURE — G1004 CDSM NDSC: HCPCS

## 2022-02-17 PROCEDURE — 97535 SELF CARE MNGMENT TRAINING: CPT

## 2022-02-17 PROCEDURE — 97163 PT EVAL HIGH COMPLEX 45 MIN: CPT

## 2022-02-17 PROCEDURE — 99223 1ST HOSP IP/OBS HIGH 75: CPT | Performed by: STUDENT IN AN ORGANIZED HEALTH CARE EDUCATION/TRAINING PROGRAM

## 2022-02-17 PROCEDURE — 97167 OT EVAL HIGH COMPLEX 60 MIN: CPT

## 2022-02-17 PROCEDURE — 99222 1ST HOSP IP/OBS MODERATE 55: CPT | Performed by: PHYSICIAN ASSISTANT

## 2022-02-17 PROCEDURE — 97530 THERAPEUTIC ACTIVITIES: CPT

## 2022-02-17 RX ORDER — LIDOCAINE 50 MG/G
2 PATCH TOPICAL DAILY
Status: DISCONTINUED | OUTPATIENT
Start: 2022-02-17 | End: 2022-02-20 | Stop reason: HOSPADM

## 2022-02-17 RX ORDER — ACETAMINOPHEN 325 MG/1
975 TABLET ORAL EVERY 8 HOURS SCHEDULED
Status: DISCONTINUED | OUTPATIENT
Start: 2022-02-17 | End: 2022-02-20 | Stop reason: HOSPADM

## 2022-02-17 RX ORDER — METOCLOPRAMIDE HYDROCHLORIDE 5 MG/ML
10 INJECTION INTRAMUSCULAR; INTRAVENOUS ONCE
Status: COMPLETED | OUTPATIENT
Start: 2022-02-17 | End: 2022-02-17

## 2022-02-17 RX ORDER — METOPROLOL TARTRATE 50 MG/1
50 TABLET, FILM COATED ORAL EVERY 12 HOURS SCHEDULED
Status: DISCONTINUED | OUTPATIENT
Start: 2022-02-17 | End: 2022-02-20 | Stop reason: HOSPADM

## 2022-02-17 RX ORDER — PREGABALIN 100 MG/1
100 CAPSULE ORAL 2 TIMES DAILY
Status: DISCONTINUED | OUTPATIENT
Start: 2022-02-17 | End: 2022-02-20 | Stop reason: HOSPADM

## 2022-02-17 RX ORDER — ONDANSETRON 2 MG/ML
4 INJECTION INTRAMUSCULAR; INTRAVENOUS ONCE
Status: COMPLETED | OUTPATIENT
Start: 2022-02-17 | End: 2022-02-17

## 2022-02-17 RX ORDER — PRAVASTATIN SODIUM 80 MG/1
80 TABLET ORAL
Status: DISCONTINUED | OUTPATIENT
Start: 2022-02-17 | End: 2022-02-20 | Stop reason: HOSPADM

## 2022-02-17 RX ORDER — LORATADINE 10 MG/1
10 TABLET ORAL DAILY
Status: DISCONTINUED | OUTPATIENT
Start: 2022-02-17 | End: 2022-02-20 | Stop reason: HOSPADM

## 2022-02-17 RX ORDER — AMLODIPINE BESYLATE 5 MG/1
5 TABLET ORAL DAILY
Status: DISCONTINUED | OUTPATIENT
Start: 2022-02-17 | End: 2022-02-20 | Stop reason: HOSPADM

## 2022-02-17 RX ORDER — ONDANSETRON 2 MG/ML
INJECTION INTRAMUSCULAR; INTRAVENOUS
Status: COMPLETED
Start: 2022-02-17 | End: 2022-02-17

## 2022-02-17 RX ORDER — INSULIN GLARGINE 100 [IU]/ML
40 INJECTION, SOLUTION SUBCUTANEOUS EVERY 12 HOURS SCHEDULED
Status: DISCONTINUED | OUTPATIENT
Start: 2022-02-17 | End: 2022-02-20 | Stop reason: HOSPADM

## 2022-02-17 RX ORDER — AMMONIUM LACTATE 12 G/100G
LOTION TOPICAL 2 TIMES DAILY
Status: DISCONTINUED | OUTPATIENT
Start: 2022-02-17 | End: 2022-02-20 | Stop reason: HOSPADM

## 2022-02-17 RX ORDER — MONTELUKAST SODIUM 10 MG/1
10 TABLET ORAL DAILY
Status: DISCONTINUED | OUTPATIENT
Start: 2022-02-17 | End: 2022-02-20 | Stop reason: HOSPADM

## 2022-02-17 RX ORDER — ALBUTEROL SULFATE 90 UG/1
2 AEROSOL, METERED RESPIRATORY (INHALATION) EVERY 6 HOURS PRN
Status: DISCONTINUED | OUTPATIENT
Start: 2022-02-17 | End: 2022-02-20 | Stop reason: HOSPADM

## 2022-02-17 RX ADMIN — ACETAMINOPHEN 975 MG: 325 TABLET, FILM COATED ORAL at 11:39

## 2022-02-17 RX ADMIN — METOPROLOL TARTRATE 50 MG: 50 TABLET, FILM COATED ORAL at 08:09

## 2022-02-17 RX ADMIN — ONDANSETRON 4 MG: 2 INJECTION INTRAMUSCULAR; INTRAVENOUS at 00:43

## 2022-02-17 RX ADMIN — APIXABAN 5 MG: 5 TABLET, FILM COATED ORAL at 08:09

## 2022-02-17 RX ADMIN — INSULIN LISPRO 12 UNITS: 100 INJECTION, SOLUTION INTRAVENOUS; SUBCUTANEOUS at 08:08

## 2022-02-17 RX ADMIN — METOPROLOL TARTRATE 50 MG: 50 TABLET, FILM COATED ORAL at 21:28

## 2022-02-17 RX ADMIN — Medication: at 17:29

## 2022-02-17 RX ADMIN — INSULIN LISPRO 12 UNITS: 100 INJECTION, SOLUTION INTRAVENOUS; SUBCUTANEOUS at 11:00

## 2022-02-17 RX ADMIN — PREGABALIN 100 MG: 100 CAPSULE ORAL at 08:09

## 2022-02-17 RX ADMIN — Medication: at 09:19

## 2022-02-17 RX ADMIN — INSULIN LISPRO 16 UNITS: 100 INJECTION, SOLUTION INTRAVENOUS; SUBCUTANEOUS at 21:36

## 2022-02-17 RX ADMIN — LIDOCAINE 5% 2 PATCH: 700 PATCH TOPICAL at 10:51

## 2022-02-17 RX ADMIN — METOCLOPRAMIDE 10 MG: 5 INJECTION, SOLUTION INTRAMUSCULAR; INTRAVENOUS at 02:45

## 2022-02-17 RX ADMIN — INSULIN LISPRO 20 UNITS: 100 INJECTION, SOLUTION INTRAVENOUS; SUBCUTANEOUS at 16:30

## 2022-02-17 RX ADMIN — PRAVASTATIN SODIUM 80 MG: 80 TABLET ORAL at 16:30

## 2022-02-17 RX ADMIN — INSULIN GLARGINE 40 UNITS: 100 INJECTION, SOLUTION SUBCUTANEOUS at 21:29

## 2022-02-17 RX ADMIN — PREGABALIN 100 MG: 100 CAPSULE ORAL at 17:29

## 2022-02-17 RX ADMIN — APIXABAN 5 MG: 5 TABLET, FILM COATED ORAL at 17:29

## 2022-02-17 RX ADMIN — AMLODIPINE BESYLATE 5 MG: 5 TABLET ORAL at 08:09

## 2022-02-17 RX ADMIN — MONTELUKAST SODIUM 10 MG: 10 TABLET, FILM COATED ORAL at 08:09

## 2022-02-17 RX ADMIN — LORATADINE 10 MG: 10 TABLET ORAL at 08:09

## 2022-02-17 RX ADMIN — INSULIN GLARGINE 40 UNITS: 100 INJECTION, SOLUTION SUBCUTANEOUS at 08:09

## 2022-02-17 RX ADMIN — ACETAMINOPHEN 975 MG: 325 TABLET, FILM COATED ORAL at 21:29

## 2022-02-17 NOTE — PHYSICAL THERAPY NOTE
PT TREATMENT     02/17/22 1400   PT Last Visit   PT Visit Date 02/17/22   Note Type   Note Type Treatment   Pain Assessment   Pain Assessment Tool Guan-Baker FACES   Guan-Baker FACES Pain Rating 4   Pain Location/Orientation Orientation: Left; Location: Shoulder   Restrictions/Precautions   Weight Bearing Precautions Per Order Yes   RUE Weight Bearing Per Order NWB   General   Chart Reviewed Yes   Family/Caregiver Present No   Cognition   Overall Cognitive Status WFL   Arousal/Participation Cooperative   Attention Within functional limits   Following Commands Follows all commands and directions without difficulty   Subjective   Subjective "I want to get back in bed, I'm tired"   Bed Mobility   Sit to Supine 2  Maximal assistance   Additional items Assist x 2;LE management;Verbal cues   Additional Comments Pt presents in chair   Transfers   Sit to Stand 3  Moderate assistance   Additional items Assist x 2;Verbal cues   Stand to Sit 3  Moderate assistance   Additional items Assist x 2;Verbal cues   Stand pivot 3  Moderate assistance   Additional items Assist x 2;Verbal cues   Additional Comments Pt able to take a few steps to get from chair to bed  Activity Tolerance   Activity Tolerance Patient limited by pain; Patient limited by fatigue   Nurse Made Aware yes: Muriel Briggs: pt back in bed   Assessment   Prognosis Good   Problem List Decreased strength;Decreased endurance; Impaired balance;Decreased mobility; Decreased skin integrity;Pain;Orthopedic restrictions   Assessment Pt presents in chair , fatigued, requesting to return to bed  Pt transferred sit > stand with Mod A of 2 with verbal and tactile cues to use RUE on chair arm and assist with legs to stand  Assist given on left side around pt's torso to boost into standing , on right : assist under right arm  Pt able to stand at the chair to get her weight shifted over her feet to allow pt to take steps     Pt took several steps from chair to bed with Mod A  and verbal cues  Stand > sit with Min/Mod A of 2 and verbal cues  Pt sit > supine with Max A of 2 to manage LEs and left UE to now cause increased pain with transfer  Pt positioned with pillow under left shoulder for comfort   all needs in reach  Pt able to assist once on her feet and steady in standing  Cont as per plan  The patient's AM-PAC Basic Mobility Inpatient Short Form Raw Score is 11  A Raw score of less than or equal to 16 suggests the patient may benefit from discharge to post-acute rehabilitation services  Please also refer to the recommendation of the Physical Therapist for safe discharge planning     Goals   Patient Goals to have less pain   Plan   Treatment/Interventions Functional transfer training   Progress Progressing toward goals   PT Frequency Other (Comment)  (5w)   Recommendation   PT Discharge Recommendation Post acute rehabilitation services   Additional Comments RN requested assist to get pt back to bed; she could not get her into standing  AM-PAC Basic Mobility Inpatient   Turning in Bed Without Bedrails 2   Lying on Back to Sitting on Edge of Flat Bed 2   Moving Bed to Chair 2   Standing Up From Chair 2   Walk in Room 2   Climb 3-5 Stairs 1   Basic Mobility Inpatient Raw Score 11   Basic Mobility Standardized Score 30 25   Highest Level Of Mobility   JH-HLM Goal 4: Move to chair/commode   JH-HLM Highest Level of Mobility 4: Move to chair/commode   JH-HLM Goal Achieved Yes   End of Consult   Patient Position at End of Consult Supine;Bed/Chair alarm activated; All needs within reach   Licensure   [address] [facility]  Kyree Dyson [place] PT  [id number]

## 2022-02-17 NOTE — CASE MANAGEMENT
Case Management Assessment & Discharge Planning Note    Patient name Constantine Gutierrez  [facility] MRN [id number]  : 1950 Date 2022       Current Admission Date: 2022  Current Admission Diagnosis:Humeral head fracture   Patient Active Problem List    Diagnosis Date Noted    Humeral head fracture 2022    Acute kidney failure, unspecified ([facility] ) 2019    CKD (chronic kidney disease) stage 3, GFR 30-59 ml/min ([place] [address] ) 2019    Essential hypertension 2019    Anemia 2019    Mixed hyperlipidemia 2019    Paroxysmal atrial fibrillation ([place] [address] ) 2019    Lower leg edema 2019    Asthma 2018    Diabetes mellitus type 2 in nonobese ([place] [address] ) 2018    Morbid obesity with BMI of 45 0-49 9, adult ([place] [address] ) 2018      LOS (days): 0  Geometric Mean LOS (GMLOS) (days):   Days to GMLOS:     OBJECTIVE:              Current admission status: Observation       Preferred Pharmacy:   [facility] [address] [place] [id number]  Phone: [phone] Fax: [phone]    Primary Care Provider: Marcia Chavez    Primary Insurance: MEDICARE  Secondary Insurance: [employer]    ASSESSMENT:  Active Health Care Agents    There are no active Health Care Agents on file  Patient Information  Admitted from[de-identified] Home  Mental Status: Alert  During Assessment patient was accompanied by: Not accompanied during assessment  Assessment information provided by[de-identified] Patient  Primary Caregiver: Self  Support Systems: Family members  [place] of Residence: [address] [place] [address] do you live in?: [place]  Home entry access options   Select all that apply : Stairs  Number of steps to enter home : 5  Do the steps have railings?: Yes  Type of Current Residence: Apartment  Floor Level: 1  Upon entering residence, is there a bedroom on the main floor (no further steps)?: Yes  Upon entering residence, is there a bathroom on the main floor (no further steps)?: Yes  In the last 12 months, was there a time when you were not able to pay the mortgage or rent on time?: No  In the last 12 months, how many places have you lived?: 1  In the last 12 months, was there a time when you did not have a steady place to sleep or slept in a shelter (including now)?: No  Homeless/housing insecurity resource given?: N/A  Living Arrangements: Lives Alone  Is patient a ?: No    Activities of Daily Living Prior to Admission  Functional Status: Independent  Completes ADLs independently?: Yes  Ambulates independently?: Yes  Does patient use assisted devices?: No  Does patient currently own DME?: Yes  What DME does the patient currently own?: Shower Chair,Straight Cane,Walker  Does patient have a history of Outpatient Therapy (PT/OT)?: No  Does the patient have a history of Short-Term Rehab?: Yes (has stayed at [facility] in the past would go back)  Does patient have a history of [employer] [employer]?: No  Does patient currently have [address]?: No         Patient Information Continued  Income Source: SSI/SSD  Does patient have prescription coverage?: Yes  Within the past 12 months, you worried that your food would run out before you got the money to buy more : Never true  Within the past 12 months, the food you bought just didnt last and you didnt have money to get more : Never true  Food insecurity resource given?: N/A  Does patient receive dialysis treatments?: No  Does patient have a history of substance abuse?: No  Does patient have a history of Mental Health Diagnosis?: No         Means of Transportation  Means of Transport to Appts[de-identified] Drives Self  In the past 12 months, has lack of transportation kept you from medical appointments or from getting medications?: No  In the past 12 months, has lack of transportation kept you from meetings, work, or from getting things needed for daily living?: No  Was application for public transport provided?: N/A        DISCHARGE DETAILS:    Discharge planning discussed with[de-identified] Patient  Freedom of Choice: Yes  Comments - Freedom of Choice: Pt/Ot eval pending, pt is open to STR if need and HHC,  she undertands FOC and is ok with blanket referrals being sent for either dispostion  CM contacted family/caregiver?: No- see comments (patient stated she will update neice)  Were Treatment Team discharge recommendations reviewed with patient/caregiver?: Yes  Did patient/caregiver verbalize understanding of patient care needs?: Yes  Were patient/caregiver advised of the risks associated with not following Treatment Team discharge recommendations?: Yes         Requested [address]         Is the patient interested in [facility] at discharge?:  (TBD)    DME Referral Provided  Referral made for DME?:  (pt to speak with neice to decide if any other DME is needed)         Would you like to participate in our [address] service program?  : No - Declined    CM spoke with pt reviewed demographics and performed assessment  Pt currently lives alone, but has spoken to her niece about staying with her after post acute stay  PT/OT eval pending for dispo recs, pt is open to going to STR if needed, and is open to [facility]  Pt states she can update niece on conversation, and will speak with her about possibly needing a hospital bed after post acute stay    Cm to follow

## 2022-02-17 NOTE — DISCHARGE INSTRUCTIONS
Wear the sling, ice the area off and on  Use tylenol for mild pain and the tramadol for more severe pain  Call the orthopedist tomorrow and let them know you were in the ER and we spoke with Dr Lambert Hendrickson who said he would see you in the office Friday and schedule surgery  Return to the ER if severely worsening pain, numb/cold/discolored fingers        Arm Fracture in Adults   WHAT YOU NEED TO KNOW:   An arm fracture is a crack or break in one or more of the bones in your arm  DISCHARGE INSTRUCTIONS:   Return to the emergency department if:   · The pain in your injured arm does not get better or gets worse, even after you rest and take medicine  · Your injured arm, hand, or fingers feel numb  · Your arm is swollen, red, and feels warm  · Your skin over the fracture is swollen, cold, or pale  · You cannot move your arm, hand, or fingers  Call your doctor if:   · You have a fever  · Your brace or splint becomes wet, damaged, or comes off  · You have questions or concerns about your injury, treatment, or care  Medicines: You may need any of the following:  · NSAIDs , such as ibuprofen, help decrease swelling, pain, and fever  This medicine is available with or without a doctor's order  NSAIDs can cause stomach bleeding or kidney problems in certain people  If you take blood thinner medicine, always ask your healthcare provider if NSAIDs are safe for you  Always read the medicine label and follow directions  · Acetaminophen  decreases pain and fever  It is available without a doctor's order  Ask how much to take and how often to take it  Follow directions  Read the labels of all other medicines you are using to see if they also contain acetaminophen, or ask your doctor or pharmacist  Acetaminophen can cause liver damage if not taken correctly  Do not use more than 4 grams (4,000 milligrams) total of acetaminophen in one day  · Prescription pain medicine  may be given   Ask your healthcare provider how to take this medicine safely  Some prescription pain medicines contain acetaminophen  Do not take other medicines that contain acetaminophen without talking to your healthcare provider  Too much acetaminophen may cause liver damage  Prescription pain medicine may cause constipation  Ask your healthcare provider how to prevent or treat constipation  · Take your medicine as directed  Contact your healthcare provider if you think your medicine is not helping or if you have side effects  Tell him or her if you are allergic to any medicine  Keep a list of the medicines, vitamins, and herbs you take  Include the amounts, and when and why you take them  Bring the list or the pill bottles to follow-up visits  Carry your medicine list with you in case of an emergency  Self-care:   · Elevate  your arm above the level of your heart as often as you can  This will help decrease swelling and pain  Prop your arm on pillows or blankets to keep it elevated comfortably  · Apply ice  on your arm for 15 to 20 minutes every hour or as directed  Use an ice pack, or put crushed ice in a plastic bag  Cover it with a towel  Ice helps prevent tissue damage and decreases swelling and pain  · Rest  your arm as much as possible  Ask your healthcare provider when you can put pressure or weight on your arm  Also ask when you can return to sports or exercise  Care for your cast or splint:  Ask your healthcare provider when it is okay to bathe  Do not get your cast or splint wet  Before you take a bath or shower, cover your cast or splint with a plastic bag  Tape the bag to your skin to help keep water out  Hold your arm away from the water in case the bag has a hole or tear  · Check the skin around your cast or splint each day for any redness or open skin  · Do not use a sharp or pointed object to scratch your skin under the cast or splint      Physical therapy:  A physical therapist teaches you exercises to help improve movement and strength, and to decrease pain  Follow up with your doctor within 1 week: You may need to see a bone specialist within 3 to 4 days if you need surgery or more treatment  Write down your questions so you remember to ask them during your visits  © Copyright [employer] 2021 Information is for End User's use only and may not be sold, redistributed or otherwise used for commercial purposes  All illustrations and images included in CareNotes® are the copyrighted property of [employer]  or [employer]ina [employer]  The above information is an  only  It is not intended as medical advice for individual conditions or treatments  Talk to your doctor, nurse or pharmacist before following any medical regimen to see if it is safe and effective for you

## 2022-02-17 NOTE — ASSESSMENT & PLAN NOTE
Lab Results   Component Value Date    HGBA1C 10 3 01/21/2020       No results for input(s): POCGLU in the last 72 hours      Blood Sugar Average: Last 72 hrs:  Continue lantus  Accuchecks AC/HS with insulin sliding scale

## 2022-02-17 NOTE — PHYSICIAN ADVISOR
Current patient class: Observation  The patient is currently on Hospital Day: 2 at [address] [place] [address]      This patient was originally admitted to the hospital under observation class  After admission the patient was reevaluated and determined to require further hospitalization  The patient was then documented to require at least a 2nd midnight in the hospital  As such the patient was then expected to satisfy the 2 midnight benchmark and was therefore eligible for inpatient admission  After review of the relevant documentation, labs, vital signs and test results, the patient is appropriate for INPATIENT ADMISSION  Admission to the hospital as an inpatient is a complex decision making process which requires the practitioner to consider the patients presenting complaint, history and physical examination and all relevant testing  With this in mind, in this case, the patient was deemed appropriate for INPATIENT ADMISSION  After review of the documentation and testing available at the time of the admission I concur with this clinical determination of medical necessity  Rationale is as follows: The patient is a 73 yrs Female who presented to the ED at 2/16/2022  9:23 PM with a chief complaint of Fall (Pt fell from step outside, reports landing on left shoulder and hearing/feeling a crack, reports no LOC, is on blood thinners  ) and Shoulder Injury  Patient was noted to have a humeral fracture     Patient was noted to have extensive community fracture of the surgical neck  Patient also states that she cannot ambulate safely at this time  She did also have some nausea and vomiting after receiving pain medications  Patient is still awaiting PT OT evaluation    Given that this is an elderly female patient who fell with the pretty significant fracture with difficulty with ambulation requiring skilled physical and occupational therapy evaluations for safe disposition and pain management, I am recommending if the patient does state tonight that she be changed to inpatient status because she will cross the 2 midnight benchmark while receiving appropriate inpatient services      The patients vitals on arrival were   ED Triage Vitals   Temperature Pulse Respirations Blood Pressure SpO2   02/16/22 2130 02/16/22 2130 02/16/22 2130 02/16/22 2133 02/16/22 2130   (!) 96 9 °F (36 1 °C) 76 20 162/71 99 %      Temp Source Heart Rate Source Patient Position - Orthostatic VS BP Location FiO2 (%)   02/16/22 2130 02/16/22 2130 02/16/22 2133 02/16/22 2133 --   Tympanic Monitor Lying Right arm       Pain Score       02/16/22 2130       10 - Worst Possible Pain           Past Medical History:   Diagnosis Date    A-fib ([address] )     Asthma     CKD (chronic kidney disease)     Diabetes mellitus (HCC)     GERD (gastroesophageal reflux disease)     Hyperlipidemia     Hypertension     Kidney stones     PONV (postoperative nausea and vomiting)     SVT [facility]supraventricular tachycardia) ([facility])      Past Surgical History:   Procedure Laterality Date    APPENDECTOMY      CYSTOSCOPY W/ LASER LITHOTRIPSY Left 7/12/2016    Procedure: CYSTOSCOPY URETEROSCOPY WITH LITHOTRIPSY HOLMIUM LASER, RETROGRADE PYELOGRAM AND INSERTION STENT URETERAL;  Surgeon: Krystyna Hernandez MD;  Location: [address] [place] [address];  Service:    [facility] DILATION AND CURETTAGE OF UTERUS      JOINT REPLACEMENT      right knee    KNEE ARTHROPLASTY Right     WA CYSTOURETHROSCOPY,URETER CATHETER Left 6/29/2016    Procedure: CYSTOSCOPY RETROGRADE PYELOGRAM WITH INSERTION STENT URETERAL, left;  Surgeon: Krystyna Hernandez MD;  Location: [address] [place] [address];  Service: Urology    SHOULDER ARTHROTOMY Left        The patient was admitted to the hospital at N/A on N/A for the following diagnosis:  Shoulder pain [M25 519]  Displaced fracture of left humerus [S42 302A]    Consults have been placed to:   IP CONSULT TO CASE MANAGEMENT  IP CONSULT TO [address] [place] [address]:    02/17/22 1885 TIFFANIE Burks 02/17/22 0330 02/17/22 0419 02/17/22 0800   BP: 102/56  151/70 132/61   BP Location: Right arm  Right arm Right arm   Pulse: 91 96 100 (!) 107   Resp: 18  18 16   Temp:   97 6 °F (36 4 °C) 97 9 °F (36 6 °C)   TempSrc:   Oral Oral   SpO2: 95% 100% 100% 98%   Weight:   122 kg (268 lb)    Height:   5' 2" (1 575 m)        Most recent labs:    Recent Labs     02/16/22  2335   WBC 11 92*   HGB 11 7   HCT 36 7      K 4 8   CALCIUM 8 1*   BUN 39*   CREATININE 1 52*   INR 1 13   AST 17   ALT 27   ALKPHOS 74       Scheduled Meds:  Current Facility-Administered Medications   Medication Dose Route Frequency Provider Last Rate    acetaminophen  975 mg Oral Q8H [facility] Jared Verdin DO      albuterol  2 puff Inhalation Q6H PRN RK Duron      amLODIPine  5 mg Oral Daily RK Duron      ammonium lactate   Topical BID RK Duron      apixaban  5 mg Oral BID RK Duron      insulin glargine  40 Units Subcutaneous Q12H [facility] RK Duron      insulin lispro  4-20 Units Subcutaneous TID AC Jared Verdin DO      insulin lispro  4-20 Units Subcutaneous HS Jared Verdin DO      lidocaine  2 patch Topical Daily Jared Verdin DO      loratadine  10 mg Oral Daily RK Duron      metoprolol tartrate  50 mg Oral Q12H [facility] RK Duron      montelukast  10 mg Oral Daily RK Duron      pravastatin  80 mg Oral Daily With [employer], RK      pregabalin  100 mg Oral BID RK Duron       Continuous Infusions:   PRN Meds: albuterol    Surgical procedures (if appropriate):

## 2022-02-17 NOTE — ED NOTES
Patient expressed she was uncomfortable in bed; linens changed and patient repositioned in stretcher  Patient stated she was more comfortable but felt nauseous  Patient now retching  Alayna Pichardo NP made aware        Lyle Henderson, [address] [place] [address]  02/17/22 1611

## 2022-02-17 NOTE — ASSESSMENT & PLAN NOTE
Patient sustained a mechanical fall when she bent over to reach the railing, landing on her left shoulder  CT of the left upper extremity revealed an extensively comminuted fracture at the surgical neck of the left proximal humerus  Case was discussed with surgery - plan is for outpatient repair next week  Patient states that she cannot ambulate safely  She had nausea and vomiting after receiving dilaudid in the ER  CT head negative      · Admit for observation  · PT/OT/PM

## 2022-02-17 NOTE — PLAN OF CARE
Problem: MOBILITY - ADULT  Goal: Maintain or return to baseline ADL function  Description: INTERVENTIONS:  -  Assess patient's ability to carry out ADLs; assess patient's baseline for ADL function and identify physical deficits which impact ability to perform ADLs (bathing, care of mouth/teeth, toileting, grooming, dressing, etc )  - Assess/evaluate cause of self-care deficits   - Assess range of motion  - Assess patient's mobility; develop plan if impaired  - Assess patient's need for assistive devices and provide as appropriate  - Encourage maximum independence but intervene and supervise when necessary  - Involve family in performance of ADLs  - Assess for home care needs following discharge   - Consider OT consult to assist with ADL evaluation and planning for discharge  - Provide patient education as appropriate  Outcome: Progressing  Goal: Maintains/Returns to pre admission functional level  Description: INTERVENTIONS:  - Perform BMAT or MOVE assessment daily    - Set and communicate daily mobility goal to care team and patient/family/caregiver  - Collaborate with rehabilitation services on mobility goals if consulted  - Perform Range of Motion  times a day  - Reposition patient every  hours    - Dangle patient  times a day  - Stand patient  times a day  - Ambulate patient  times a day  - Out of bed to chair  times a day   - Out of bed for meals  times a day  - Out of bed for toileting  - Record patient progress and toleration of activity level   Outcome: Progressing     Problem: Potential for Falls  Goal: Patient will remain free of falls  Description: INTERVENTIONS:  - Educate patient/family on patient safety including physical limitations  - Instruct patient to call for assistance with activity   - Consult OT/PT to assist with strengthening/mobility   - Keep Call bell within reach  - Keep bed low and locked with side rails adjusted as appropriate  - Keep care items and personal belongings within reach  - Initiate and maintain comfort rounds  - Make Fall Risk Sign visible to staff  - Offer Toileting every  Hours, in advance of need  - Initiate/Maintain alarm  - Obtain necessary fall risk management equipment:   - Apply yellow socks and bracelet for high fall risk patients  - Consider moving patient to room near nurses station  Outcome: Progressing     Problem: MUSCULOSKELETAL - ADULT  Goal: Maintain or return mobility to safest level of function  Description: INTERVENTIONS:  - Assess patient's ability to carry out ADLs; assess patient's baseline for ADL function and identify physical deficits which impact ability to perform ADLs (bathing, care of mouth/teeth, toileting, grooming, dressing, etc )  - Assess/evaluate cause of self-care deficits   - Assess range of motion  - Assess patient's mobility  - Assess patient's need for assistive devices and provide as appropriate  - Encourage maximum independence but intervene and supervise when necessary  - Involve family in performance of ADLs  - Assess for home care needs following discharge   - Consider OT consult to assist with ADL evaluation and planning for discharge  - Provide patient education as appropriate  Outcome: Progressing  Goal: Maintain proper alignment of affected body part  Description: INTERVENTIONS:  - Support, maintain and protect limb and body alignment  - Provide patient/ family with appropriate education  Outcome: Progressing

## 2022-02-17 NOTE — PHYSICAL THERAPY NOTE
PHYSICAL THERAPY EVALUATION/TREATMENT     02/17/22 1045   PT Last Visit   PT Visit Date 02/17/22   Note Type   Note type Evaluation   Pain Assessment   Pain Assessment Tool 0-10   Pain Score 5   Pain Location/Orientation Orientation: Left; Location: Shoulder  (RN aware and handling accordingly)   Restrictions/Precautions   Weight Bearing Precautions Per Order Yes   LUE Weight Bearing Per Order NWB   Braces or Orthoses Sling  (Left UE)   Other Precautions Chair Alarm; Bed Alarm;Pain; Fall Risk   Home Living   Type of Home Apartment   Home Layout One level   Bathroom Shower/Tub Tub/shower unit   [address] chair   Home Equipment Walker;Cane  (home O2: usually 3L ; uses cane)   Additional Comments Pt also helps to care for her nicole's children at times; and nicole will help her as needed   Prior Function   Level of [place] Independent with ADLs and functional mobility   Lives With Alone   Receives Help From Family   ADL Assistance Independent   IADLs Independent   Comments +    General   Additional Pertinent History admitted 2/2 fall with subsequeent left humeral fracture: for surgery next week   Family/Caregiver Present No   Cognition   Overall Cognitive Status WFL   Arousal/Participation Cooperative   Attention Within functional limits   Orientation Level Oriented X4   Following Commands Follows all commands and directions without difficulty   Subjective   Subjective Pt cannot get comfortable with her left arm   RLE Assessment   RLE Assessment WFL  (strength: at least 3+/5; ROM limited by body habitus at hip)   LLE Assessment   LLE Assessment WFL  (strength: at least 3+/5; ROM limited by body habitus at hip)   Transfers   Sit to Stand 3  Moderate assistance   Additional items Assist x 2;Verbal cues   Stand to Sit 3  Moderate assistance   Additional items Assist x 2;Verbal cues   Ambulation/Elevation   Gait pattern Wide ONUR; Forward Flexion  (guarded due to left arm pain)   Gait Assistance 3  Moderate assist   Additional items Assist x 2;Verbal cues   Assistive Device None  (bilateral assist: right arm and around pt's torso)   Distance 4 feet x2   Ambulation/Elevation Additional Comments ambulated from bed > commode > chair    Balance   Static Sitting Fair   Dynamic Sitting Fair -   Static Standing Poor   Dynamic Standing Poor   Ambulatory Poor  (without AD)   Activity Tolerance   Activity Tolerance Patient limited by pain; Patient limited by fatigue   Nurse Made Aware yes: Kevin Melisajohnny: pt in chair   Assessment   Prognosis Good   Problem List Decreased strength;Decreased endurance; Impaired balance;Decreased mobility; Decreased skin integrity;Orthopedic restrictions;Pain   Assessment Patient seen for Physical Therapy evaluation  Patient admitted with Humeral head fracture  Comorbidities affecting patient's physical performance include: A fib, CKD, DM,HTN,  Personal factors affecting patient at time of initial evaluation include: lives in single story house, ambulating with assistive device, stairs to enter home, inability to ambulate household distances, inability to navigate community distances, inability to navigate level surfaces without external assistance, limited home support, positive fall history, inability to perform caregiver support/tasks, inability to perform physical activity, inability to perform ADLS and inability to perform IADLS   Prior to admission, patient was independent with functional mobility with occasional use of a cane, independent with ADLS, independent with IADLS, living alone in single story home with 5 steps to enter, ambulating household distance and ambulating community distances    Please find objective findings from Physical Therapy assessment regarding body systems outlined above with impairments and limitations including weakness, decreased ROM, impaired balance, decreased endurance, gait deviations, pain, decreased activity tolerance, decreased functional mobility tolerance, fall risk, orthopedic restrictions and decreased skin integrity  The Barthel Index was used as a functional outcome tool presenting with a score of Barthel Index Score: 30 today indicating marked limitations of functional mobility and ADLS  Patient's clinical presentation is currently unstable/unpredictable as seen in patient's presentation of changing level of pain, increased fall risk, new onset of impairment of functional mobility, decreased endurance and new onset of weakness  Pt would benefit from continued Physical Therapy treatment to address deficits as defined above and maximize level of functional mobility  As demonstrated by objective findings, the assigned level of complexity for this evaluation is high  The patient's AM-[facility] Basic Mobility Inpatient Short Form Raw Score is 11  A Raw score of less than or equal to 16 suggests the patient may benefit from discharge to post-acute rehabilitation services  Please also refer to the recommendation of the Physical Therapist for safe discharge plannin   Goals   Patient Goals to have surgery on her shoulder   STG Expiration Date 02/24/22   Short Term Goal #1 Min A for transfers supine <> short sit and sit <> stand with fair balance   Short Term Goal #2 Min A for amb  with SPC for 50 feet with changes in direction and fair balance   LTG Expiration Date 03/03/22   Long Term Goal #1 Indep transfers sit <> stand with good balance   Long Term Goal #2 Indep amb  with /without AD with good balance for functional household distances   , Indep navigating 5 stairs to allow pt to enter/exit her home  Plan   Treatment/Interventions ADL retraining;Functional transfer training;LE strengthening/ROM; Elevations; Therapeutic exercise; Endurance training;Patient/family training;Equipment eval/education; Bed mobility;Gait training;Spoke to nursing;Spoke to case management;OT   PT Frequency Other (Comment)  (5/wk)   Recommendation   PT Discharge Recommendation Post acute rehabilitation services   Additional Comments Pt is having a difficult time with functional mobility due to no use of her left (dominant ) UE and pain  will benefit from [facility]   Turning in Bed Without Bedrails 2   Lying on Back to Sitting on Edge of Flat Bed 2   Moving Bed to Chair 2   Standing Up From Chair 2   Walk in Room 2   Climb 3-5 Stairs 1   Basic Mobility Inpatient Raw Score 11   Basic Mobility Standardized Score 30 25   Highest Level Of Mobility   -[facility] Goal 4: Move to chair/commode   -[facility] Highest Level of Mobility 4: Move to chair/commode   -[facility] Goal Achieved Yes   Barthel Index   Feeding 5   Bathing 0   Grooming Score 0   Dressing Score 0   Bladder Score 5   Bowels Score 10   Toilet Use Score 5   Transfers (Bed/Chair) Score 5   Mobility (Level Surface) Score 0   Stairs Score 0   Barthel Index Score 30   Additional Treatment Session   Start Time 1035   End Time 1045   Treatment Assessment Pt worked on static standing at bedside to get her balance with Mod A and verbal cues   Pt progressed to taking steps with Mod A  and verbal cues 4 feet x 2 and  fwd/bwd steps   Pt positioned in chair at end of session   Tolerated fairly well  Limited by increased pain  Will benefit from continued PT, will try use of cane next session  Exercises   Balance training  with standing static and dynamic taking steps    End of Consult   Patient Position at End of Consult Bedside chair;Bed/Chair alarm activated; All needs within reach   [address] [place] Number  Bev Whiteside PT  [phone]

## 2022-02-17 NOTE — CONSULTS
Chief Complaint     Left shoulder pain      History of the Present Illness     Chau Stoddard is a 73 y o  female who presented to the ED overnight for left shoulder pain after a mechanical fall backwards after she missed a step  Patient is left hand dominant  She states that prior to this fall, she did not have any pain or problems using the arm  She did have a rotator cuff repair when she was younger on this side, but states she regained all of her motion and function after this  Currently, patient states the pain is manageable with Tylenol and lidocaine patches  She did get n/v after Dilaudid earlier and is hoping to avoid this  Patient describes some slight n/t sensation along her anterior shoulder  She states she is able to move her fingers and hand without issue  She has some soreness around the elbow and was informed she has a bruise here  She believes she may have hit this too when she fell  Patient states she normally is very active and lives on her own  She describes herself as self sufficient  Patient is on Eliquis            Past Medical History:   Diagnosis Date    A-fib ([facility] [address] )     Asthma     CKD (chronic kidney disease)     Diabetes mellitus (HCC)     GERD (gastroesophageal reflux disease)     Hyperlipidemia     Hypertension     Kidney stones     PONV (postoperative nausea and vomiting)     SVT [facility]supra[facility]ricular tachycardia) ([facility])        Past Surgical History:   Procedure Laterality Date    APPENDECTOMY      CYSTOSCOPY W/ LASER LITHOTRIPSY Left 7/12/2016    Procedure: CYSTOSCOPY URETEROSCOPY WITH LITHOTRIPSY HOLMIUM LASER, RETROGRADE PYELOGRAM AND INSERTION STENT URETERAL;  Surgeon: Dorota Bernstein MD;  Location: [address] [place] [address];  Service:    [facility] Needs DILATION AND CURETTAGE OF UTERUS      JOINT REPLACEMENT      right knee    KNEE ARTHROPLASTY Right     ND CYSTOURETHROSCOPY,URETER CATHETER Left 6/29/2016    Procedure: CYSTOSCOPY RETROGRADE PYELOGRAM WITH INSERTION STENT URETERAL, left;  Surgeon: Dave Rico MD;  Location: [facility];  Service: Urology    SHOULDER ARTHROTOMY Left        Allergies   Allergen Reactions    Asa [Aspirin] GI Intolerance    Indocin [Indomethacin] Other (See Comments)     Made patient "loopy"    Penicillins Hives    Percocet [Oxycodone-Acetaminophen]        No current facility-administered medications on file prior to encounter       Current Outpatient Medications on File Prior to Encounter   Medication Sig Dispense Refill    amLODIPine (NORVASC) 5 mg tablet Take 1 tablet (5 mg total) by mouth daily 30 tablet 0    ammonium lactate (LAC-HYDRIN) 12 % lotion APPLY TOPICALLY TO AFFECTED AREAS twice a day      apixaban (ELIQUIS) 5 mg Take 1 tablet (5 mg total) by mouth 2 (two) times a day 60 tablet 0    desloratadine (CLARINEX) 5 MG tablet Take by mouth      Insulin Glargine (TOUJEO SOLOSTAR) injection pen 300 units/mL Inject 40 Units under the skin 2 (two) times a day        metoprolol tartrate (LOPRESSOR) 25 mg tablet Take 2 tablets (50 mg total) by mouth every 12 (twelve) hours 180 tablet 3    montelukast (SINGULAIR) 10 mg tablet Take 10 mg by mouth daily        pregabalin (LYRICA) 100 mg capsule Take 100 mg by mouth 2 (two) times a day       rosuvastatin (CRESTOR) 10 MG tablet   0    torsemide (DEMADEX) 10 mg tablet Take 1 tablet (10 mg total) by mouth daily for 30 days (Patient taking differently: Take 5 mg by mouth daily ) 30 tablet 0    Trulicity 1 5 EN/9 7DX SOPN inject 0 5 milliliter subcutaneously every week 28      albuterol (PROVENTIL HFA,VENTOLIN HFA) 90 mcg/act inhaler Inhale 2 puffs every 6 (six) hours as needed for wheezing      insulin lispro (HUMALOG) 100 units/mL injection Inject under the skin 3 (three) times a day before meals Inject 5 units subq every 8 hours before meals if fasting BS>125      NOVOFINE AUTOCOVER 30G X 8 MM MISC   0    [DISCONTINUED] cefuroxime (CEFTIN) 500 mg tablet Take 500 mg by mouth 2 (two) times a day      [DISCONTINUED] Cyanocobalamin 1000 MCG SUBL Place 1 tablet (1,000 mcg total) under the tongue daily (Patient not taking: Reported on 2021 ) 60 tablet 0    [DISCONTINUED] ergocalciferol (ERGOCALCIFEROL) 1 25 MG (99536 UT) capsule Take 1 capsule (50,000 Units total) by mouth once a week (Patient not taking: Reported on 2021 ) 12 capsule 0    [DISCONTINUED] guaiFENesin-codeine (ROBITUSSIN AC) 100-10 mg/5 mL oral solution take 5 milliliters by mouth every 6 hours for 10 days (Patient not taking: Reported on 2021)      [DISCONTINUED] losartan (COZAAR) 25 mg tablet Take 25 mg by mouth daily (Patient not taking: Reported on 2021 )      [DISCONTINUED] pantoprazole (PROTONIX) 40 mg tablet Take 40 mg by mouth daily (Patient not taking: Reported on 2021 )      [DISCONTINUED] ranitidine (ZANTAC) 150 mg tablet Take 1 tablet (150 mg total) by mouth daily at bedtime (Patient not taking: Reported on 2021 )  0    [DISCONTINUED] saccharomyces boulardii (FLORASTOR) 250 mg capsule Take 1 capsule (250 mg total) by mouth 2 (two) times a day (Patient not taking: Reported on 2019) 10 capsule 0    [DISCONTINUED] sitaGLIPtin (JANUVIA) 50 mg tablet Take 1 tablet (50 mg total) by mouth daily (Patient not taking: Reported on 9/10/2019) 30 tablet 0       Social History     Tobacco Use    Smoking status: Former Smoker     Packs/day: 1 00     Years: 20 00     Pack years: 20 00     Types: Cigarettes     Quit date: 1996     Years since quittin 6    Smokeless tobacco: Never Used   Substance Use Topics    Alcohol use: Not Currently     Comment: rarely    Drug use: No       Family History   Problem Relation Age of Onset    Heart disease Mother     Emphysema Maternal Grandmother     Heart disease Family        Review of Systems     As stated in the HPI  All other systems were reviewed and are negative        Physical Exam     /51   Pulse 82   Temp 98 2 °F (36 8 °C)   Resp 15   Ht 5' 2" (1 575 m)   Wt 122 kg (268 lb)   SpO2 100%   BMI 49 02 kg/m²     GENERAL: This is a well-developed, well-nourished, age-appropriate patient in no acute distress  The patient is alert and oriented x3  Pleasant and cooperative  Eyes: Anicteric sclerae  Extraocular movements appear intact  HENT: Nares are patent with no drainage  Lungs: There is equal chest rise on inspection  Breathing is non-labored with no audible wheezing  Cardiovascular: No cyanosis  No upper extremity lymphadema  Skin: Skin is warm to touch  No obvious skin lesions or rashes other than described below  Neurologic: No ataxia  Psychiatric: Mood and affect are appropriate  Left Upper Extremity:  Patient currently in a well-fitted sling  She has a very superficial abrasion along her lower anterior arm as well as her posterior elbow  Otherwise, skin appears intact  Currently with lidocaine patches on shoulder  Elbow does have some ecchymosis posteriorly  Patient does have tenderness to palpation of the proximal humerus  Milder tenderness along the posterior elbow where ecchymosis is located  She describes sensation intact but tingling to light palpation around the supraclavicular nerve region  Otherwise, SILT throughout the rest of the arm  She has motor intact to m/r/u nerves  Brisk capillary refill  Brief tertiary exam results in no tenderness to palpation of RUE or BLEs        Data Review     Results Reviewed     Procedure Component Value Units Date/Time    Comprehensive metabolic panel [[id number]]  (Abnormal) Collected: 02/16/22 1414    Lab Status: Final result Specimen: Blood from Arm, Right Updated: 02/17/22 0009     Sodium 142 mmol/L      Potassium 4 8 mmol/L      Chloride 103 mmol/L      CO2 29 mmol/L      ANION GAP 10 mmol/L      BUN 39 mg/dL      Creatinine 1 52 mg/dL      Glucose 305 mg/dL      Calcium 8 1 mg/dL      Corrected Calcium 8 7 mg/dL      AST 17 U/L      ALT 27 U/L      Alkaline Phosphatase 74 U/L Total Protein 6 9 g/dL      Albumin 3 2 g/dL      Total Bilirubin 0 38 mg/dL      eGFR 34 ml/min/1 73sq m     Narrative:      National Kidney Disease Foundation guidelines for Chronic Kidney Disease (CKD):     Stage 1 with normal or high GFR (GFR > 90 mL/min/1 73 square meters)    Stage 2 Mild CKD (GFR = 60-89 mL/min/1 73 square meters)    Stage 3A Moderate CKD (GFR = 45-59 mL/min/1 73 square meters)    Stage 3B Moderate CKD (GFR = 30-44 mL/min/1 73 square meters)    Stage 4 Severe CKD (GFR = 15-29 mL/min/1 73 square meters)    Stage 5 End Stage CKD (GFR <15 mL/min/1 73 square meters)  Note: GFR calculation is accurate only with a steady state creatinine    Protime-INR [[id number]]  (Normal) Collected: 02/16/22 2335    Lab Status: Final result Specimen: Blood from Arm, Right Updated: 02/16/22 2355     Protime 14 3 seconds      INR 1 13    APTT [[id number]]  (Normal) Collected: 02/16/22 2335    Lab Status: Final result Specimen: Blood from Arm, Right Updated: 02/16/22 2355     PTT 31 seconds     CBC and differential [[id number]]  (Abnormal) Collected: 02/16/22 2335    Lab Status: Final result Specimen: Blood from Arm, Right Updated: 02/16/22 2340     WBC 11 92 Thousand/uL      RBC 4 00 Million/uL      Hemoglobin 11 7 g/dL      Hematocrit 36 7 %      MCV 92 fL      MCH 29 3 pg      MCHC 31 9 g/dL      RDW 15 1 %      MPV 11 9 fL      Platelets 123 Thousands/uL      nRBC 0 /100 WBCs      Neutrophils Relative 68 %      Immat GRANS % 1 %      Lymphocytes Relative 21 %      Monocytes Relative 8 %      Eosinophils Relative 1 %      Basophils Relative 1 %      Neutrophils Absolute 8 06 Thousands/µL      Immature Grans Absolute 0 09 Thousand/uL      Lymphocytes Absolute 2 50 Thousands/µL      Monocytes Absolute 0 99 Thousand/µL      Eosinophils Absolute 0 16 Thousand/µL      Basophils Absolute 0 12 Thousands/µL              Imaging:  Xrays and CT of the left upper extremity were reviewed in PACS and demonstrate a displaced comminuted fracture along the surgical neck of the left proximal humerus  The glenohumeral joint appears intact  Assessment and Plan     73year old female with left proximal humerus fracture status post fall  I did discuss patient's imaging and physical exam findings with my attending physician  At this time, patient should continue with use of the sling  NWB LUE  She will need outpatient follow up with one of our shoulder specialists next week  Pain control per primary team  Since patient does have some tenderness and ecchymosis along the posterior elbow, I have ordered an elbow xray as well to make sure no other acute injury  Will monitor for these results  Continue to monitor neurovascular status and notify our team if any changes with this (patient made aware to notify nursing staff if she experiences this)

## 2022-02-17 NOTE — OCCUPATIONAL THERAPY NOTE
Occupational Therapy Evaluation/Treatment       02/17/22 1025   Note Type   Note type Evaluation   Restrictions/Precautions   LUE Weight Bearing Per Order NWB   Braces or Orthoses Sling  (L shoulder )   Other Precautions Chair Alarm; Bed Alarm; Fall Risk;O2   Pain Assessment   Pain Assessment Tool 0-10   Pain Score 5   Pain Location/Orientation Orientation: Left; Location: Shoulder   Home Living   Type of [address] One level  (5 JESS)   Bathroom Shower/Tub Tub/shower unit   Bathroom Toilet Standard   Bathroom Equipment Shower chair   Home Equipment Cane;Walker  (uses cane mostly, O2 3L)   Prior Function   Level of Dike Independent with ADLs and functional mobility   Lives With Alone   Receives Help From Family  (niece)   ADL Assistance Independent   IADLs Independent   Comments pt drives    ADL   Eating Assistance 4  Minimal Assistance   Grooming Assistance 3  Moderate Assistance   UB Bathing Assistance 2  Maximal Assistance   LB Bathing Assistance 2  Maximal Assistance   UB Dressing Assistance 2  Maximal Assistance   LB Dressing Assistance 2  Maximal Assistance   Toileting Assistance  2  Maximal Assistance   Bed Mobility   Supine to Sit 2  Maximal assistance   Additional items Assist x 1;Verbal cues   Transfers   Sit to Stand 3  Moderate assistance   Additional items Assist x 2;Verbal cues   Stand to Sit 3  Moderate assistance   Additional items Assist x 2;Verbal cues   Functional Mobility   Functional Mobility 3  Moderate assistance   Additional Comments assist of 2, few steps    Additional items Hand hold assistance  (on R)   Balance   Static Sitting Fair   Dynamic Sitting Fair -   Static Standing Poor   Dynamic Standing Poor   Activity Tolerance   Activity Tolerance Patient limited by pain; Patient limited by fatigue   Nurse Made Aware yes, Deb    RUE Assessment   RUE Assessment WFL   LUE Assessment   LUE Assessment   (s/p humeral fx in sling )   Cognition   Overall Cognitive Status [facility] Arousal/Participation Cooperative   Attention Within functional limits   Orientation Level Oriented X4   Following Commands Follows all commands and directions without difficulty   Assessment   Limitation Decreased ADL status; Decreased UE ROM; Decreased Safe judgement during ADL;Decreased UE strength;Decreased endurance;Decreased self-care trans;Decreased high-level ADLs  (decreased balance and mobility )   Prognosis Good   Assessment Patient evaluated by Occupational Therapy  Patient admitted with Humeral head fracture  The patients occupational profile, medical and therapy history includes a extensive additional review of physical, cognitive, or psychosocial history related to current functional performance  Comorbidities affecting functional mobility and ADLS include: Afib, asthma, CKD, diabetes and hypertension  Prior to admission, patient was independent with functional mobility with cane, independent with ADLS and independent with IADLS  The evaluation identifies the following performance deficits: weakness, impaired balance, decreased endurance, increased fall risk, new onset of impairment of functional mobility, decreased ADLS, decreased IADLS, pain, decreased activity tolerance, decreased safety awareness, impaired judgement and decreased strength, that result in activity limitations and/or participation restrictions  This evaluation requires clinical decision making of high complexity, because the patient presents with comorbidites that affect occupational performance and required significant modification of tasks or assistance with consideration of multiple treatment options  The Barthel Index was used as a functional outcome tool presenting with a score of Barthel Index Score: 30, indicating marked limitations of functional mobility and ADLS   The patient's raw score on the AM-PAC Daily Activity inpatient short form low function score is 16, standardized score is Low Function Daily Activity Standardized Score: 27 65  Patients with a standardized score less than 39 4 are likely to benefit from discharge to post-acute rehab services  Please refer to the recommendation of the Occupational Therapist for safe discharge planning  Patient will benefit from skilled Occupational Therapy services to address above deficits and facilitate a safe return to prior level of function  Goals   Patient Goals to go home and less pain   STG Time Frame   (1-7 days)   Short Term Goal  Goals established to promote Patient Goals: to go home and less pain:  Patient will increase standing tolerance to 3 minutes during ADL task to decrease assistance level and decrease fall risk; Patient will increase bed mobility to min assist in preparation for ADLS and transfers; Patient will increase functional mobility to and from bathroom with rolling walker with min assist to increase performance with ADLS and to use a toilet; Patient will tolerate 10 minutes of UE ROM/strengthening to increase general activity tolerance and performance in ADLS/IADLS; Patient will improve functional activity tolerance to 10 minutes of sustained functional tasks to increase participation in basic self-care and decrease assistance level;  Patient will increase dynamic sitting balance to fair to improve the ability to sit at edge of bed or on a chair for ADLS;  Patient will increase dynamic standing balance to poor+ to improve postural stability and decrease fall risk during standing ADLS and transfers  LTG Time Frame   (8-14 days)   Long Term Goal Patient will increase standing tolerance to 6 minutes during ADL task to decrease assistance level and decrease fall risk; Patient will increase bed mobility to supervision in preparation for ADLS and transfers;  Patient will increase functional mobility to and from bathroom with rolling walker with supervision to increase performance with ADLS and to use a toilet; Patient will tolerate 20 minutes of UE ROM/strengthening to increase general activity tolerance and performance in ADLS/IADLS; Patient will improve functional activity tolerance to 20 minutes of sustained functional tasks to increase participation in basic self-care and decrease assistance level;  Patient will increase dynamic sitting balance to fair+ to improve the ability to sit at edge of bed or on a chair for ADLS;  Patient will increase dynamic standing balance to fair- to improve postural stability and decrease fall risk during standing ADLS and transfers  Pt will score >/= 20/24 on AM-PAC Daily Activity Inpatient scale to promote safe independence with ADLs and functional mobility; Pt will score >/= 60/100 on Barthel Index in order to decrease caregiver assistance needed and increase ability to perform ADLs and functional mobility  Functional Transfer Goals   Pt Will Perform All Functional Transfers   (STG mod assist LTG min assist )   ADL Goals   Pt Will Perform Eating   (STG min assist LTG supervision )   Pt Will Perform Grooming   (STG min assist LTG supervision )   Pt Will Perform Bathing   (STG mod assist LTG min assist )   Pt Will Perform UE Dressing   (STG mod assist LTG min assist )   Pt Will Perform LE Dressing   (STG mod assist LTG min assist )   Pt Will Perform Toileting   (STG mod assist LTG min assist )   Plan   Treatment Interventions ADL retraining;Functional transfer training;UE strengthening/ROM; Endurance training;Patient/family training;Equipment evaluation/education; Activityengagement; Compensatory technique education   Goal Expiration Date 03/03/22   OT Frequency 3-5x/wk   Additional Treatment Session   Start Time 1015   End Time 1025   Treatment Assessment S: 5/10 L shoulder pain O: COmpleted commode transfer with mod assist of 2  Hygiene in stance with hand on bedrail with mod assist for dependent hygiene for urination and BM  Functional mobility 3 feet with mod assist with hand hold assist to chair    A: Patient is limited by pain and fatigue  Patient is generally weak and deconditioned and requires max assist for ADLS    P: STR   Recommendation   OT Discharge Recommendation Post acute rehabilitation services   AM-PAC Daily Activity Inpatient   Lower Body Dressing 1   Bathing 1   Toileting 1   Upper Body Dressing 1   Grooming 2   Eating 2   Daily Activity Raw Score 8   Turning Head Towards Sound 4   Follow Simple Instructions 4   Low Function Daily Activity Raw Score 16   Low Function Daily Activity Standardized Score 27 65   AM-PAC Applied Cognition Inpatient   Following a Speech/Presentation 4   Understanding Ordinary Conversation 4   Taking Medications 4   Remembering Where Things Are Placed or Put Away 4   Remembering List of 4-5 Errands 4   Taking Care of Complicated Tasks 4   Applied Cognition Raw Score 24   Applied Cognition Standardized Score 62 21   Barthel Index   Feeding 5   Bathing 0   Grooming Score 0   Dressing Score 0   Bladder Score 5   Bowels Score 10   Toilet Use Score 5   Transfers (Bed/Chair) Score 5   Mobility (Level Surface) Score 0   Stairs Score 0   Barthel Index Score 30   Licensure   [place] License Number  Lalit Santiago Chacho 89 OTR/L [id number]

## 2022-02-17 NOTE — ED PROVIDER NOTES
History  Chief Complaint   Patient presents with    Fall     Pt fell from step outside, reports landing on left shoulder and hearing/feeling a crack, reports no LOC, is on blood thinners   Shoulder Injury     72 yo female slipped on step outside and fell backward onto back of left shoulder just prior to arrival   She heard a crack and had severe pain in left shoulder  She did not hit head, no head pain  No LOC  No neck pain  Had been well prior to this  No fever, cough, vomiting  She did have covid infection about a month and a half ago  History provided by:  Patient   used: No    Fall  Associated symptoms: no neck pain and no vomiting    Shoulder Injury  Associated symptoms: no fever and no neck pain        Prior to Admission Medications   Prescriptions Last Dose Informant Patient Reported? Taking?    Cyanocobalamin 1000 MCG SUBL  Self No No   Sig: Place 1 tablet (1,000 mcg total) under the tongue daily   Patient not taking: Reported on 12/28/2021    Insulin Glargine (TOUJEO SOLOSTAR) injection pen 300 units/mL  Self Yes No   Sig: Inject 30 Units under the skin 2 (two) times a day    NOVOFINE AUTOCOVER 30G X 8 MM MISC  Self Yes No   Trulicity 1 5 AC/7 5TA SOPN   Yes No   Sig: inject 0 5 milliliter subcutaneously every week 28   albuterol (PROVENTIL HFA,VENTOLIN HFA) 90 mcg/act inhaler  Self Yes No   Sig: Inhale 2 puffs every 6 (six) hours as needed for wheezing   amLODIPine (NORVASC) 5 mg tablet  Self No No   Sig: Take 1 tablet (5 mg total) by mouth daily   ammonium lactate (LAC-HYDRIN) 12 % lotion   Yes No   Sig: APPLY TOPICALLY TO AFFECTED AREAS twice a day   apixaban (ELIQUIS) 5 mg  Self No No   Sig: Take 1 tablet (5 mg total) by mouth 2 (two) times a day   cefuroxime (CEFTIN) 500 mg tablet   Yes No   Sig: Take 500 mg by mouth 2 (two) times a day   desloratadine (CLARINEX) 5 MG tablet  Self Yes No   Sig: Take by mouth   ergocalciferol (ERGOCALCIFEROL) 1 25 MG (66305 UT) capsule   No No   Sig: Take 1 capsule (50,000 Units total) by mouth once a week   Patient not taking: Reported on 12/28/2021    guaiFENesin-codeine (ROBITUSSIN AC) 100-10 mg/5 mL oral solution   Yes No   Sig: take 5 milliliters by mouth every 6 hours for 10 days   Patient not taking: Reported on 12/28/2021   insulin lispro (HUMALOG) 100 units/mL injection  Self Yes No   Sig: Inject under the skin 3 (three) times a day before meals Inject 5 units subq every 8 hours before meals if fasting BS>125   losartan (COZAAR) 25 mg tablet   Yes No   Sig: Take 25 mg by mouth daily   Patient not taking: Reported on 12/28/2021    metoprolol tartrate (LOPRESSOR) 25 mg tablet  Self No No   Sig: Take 2 tablets (50 mg total) by mouth every 12 (twelve) hours   montelukast (SINGULAIR) 10 mg tablet  Self Yes No   Sig: Take 10 mg by mouth daily     Patient not taking: Reported on 12/28/2021    pantoprazole (PROTONIX) 40 mg tablet  Self Yes No   Sig: Take 40 mg by mouth daily   Patient not taking: Reported on 12/28/2021    pregabalin (LYRICA) 100 mg capsule  Self Yes No   Sig: Take 100 mg by mouth 2 (two) times a day    ranitidine (ZANTAC) 150 mg tablet  Self No No   Sig: Take 1 tablet (150 mg total) by mouth daily at bedtime   Patient not taking: Reported on 12/28/2021    rosuvastatin (CRESTOR) 10 MG tablet  Self Yes No   saccharomyces boulardii (FLORASTOR) 250 mg capsule  Self No No   Sig: Take 1 capsule (250 mg total) by mouth 2 (two) times a day   Patient not taking: Reported on 11/12/2019   sitaGLIPtin (JANUVIA) 50 mg tablet  Self No No   Sig: Take 1 tablet (50 mg total) by mouth daily   Patient not taking: Reported on 9/10/2019   torsemide (DEMADEX) 10 mg tablet  Self No No   Sig: Take 1 tablet (10 mg total) by mouth daily for 30 days   Patient taking differently: Take 5 mg by mouth daily       Facility-Administered Medications: None       Past Medical History:   Diagnosis Date    [facility]fib ([facility] )     Asthma     CKD (chronic kidney disease)     Diabetes mellitus ([facility] )     GERD (gastroesophageal reflux disease)     Hyperlipidemia     Hypertension     Kidney stones     PONV (postoperative nausea and vomiting)     SV[facility]ra[facility]ricular tachycardia) ([facility])        Past Surgical History:   Procedure Laterality Date    APPENDECTOMY      CYSTOSCOPY W/ LASER LITHOTRIPSY Left 2016    Procedure: CYSTOSCOPY URETEROSCOPY WITH LITHOTRIPSY HOLMIUM LASER, RETROGRADE PYELOGRAM AND INSERTION STENT URETERAL;  Surgeon: Krystyna Hernandez MD;  Location: [address] [place] [address];  Service:     DILATION AND CURETTAGE OF UTERUS      JOINT REPLACEMENT      right knee    KNEE ARTHROPLASTY Right     IA CYSTOURETHROSCOPY,URETER CATHETER Left 2016    Procedure: CYSTOSCOPY RETROGRADE PYELOGRAM WITH INSERTION STENT URETERAL, left;  Surgeon: Krystyna Hernandez MD;  Location: [address] [place] [address];  Service: Urology    SHOULDER ARTHROTOMY Left        Family History   Problem Relation Age of Onset    Heart disease Mother     Emphysema Maternal Grandmother     Heart disease Family      I have reviewed and agree with the history as documented  E-Cigarette/Vaping     E-Cigarette/Vaping Substances     Social History     Tobacco Use    Smoking status: Former Smoker     Packs/day: 1 00     Years: 20 00     Pack years: 20 00     Types: Cigarettes     Quit date: 1996     Years since quittin 6    Smokeless tobacco: Never Used   Substance Use Topics    Alcohol use: Not Currently     Comment: rarely    Drug use: No       Review of Systems   Constitutional: Negative for fever  Respiratory: Negative for cough  Gastrointestinal: Negative for diarrhea and vomiting  Musculoskeletal: Negative for neck pain  Physical Exam  Physical Exam  Vitals and nursing note reviewed  Constitutional:       General: She is in acute distress  Appearance: She is not ill-appearing  HENT:      Head: Normocephalic and atraumatic        Right Ear: External ear normal       Left Ear: External ear normal    Eyes:      General: No scleral icterus  Conjunctiva/sclera: Conjunctivae normal    Cardiovascular:      Rate and Rhythm: Normal rate and regular rhythm  Heart sounds: Normal heart sounds  Pulmonary:      Effort: Pulmonary effort is normal  No respiratory distress  Breath sounds: Normal breath sounds  Abdominal:      General: Abdomen is flat  Palpations: Abdomen is soft  Tenderness: There is no abdominal tenderness  Musculoskeletal:         General: Swelling, tenderness and deformity present  Cervical back: Normal range of motion and neck supple  No tenderness  Right lower leg: No edema  Left lower leg: No edema  Comments: Left shoulder + anterior fullness palp with ttp diffusely  No step off at [facility]  Radial pulse palp  Distal m/s intact  Skin:     General: Skin is warm and dry  Capillary Refill: Capillary refill takes less than 2 seconds  Findings: No rash  Neurological:      General: No focal deficit present  Mental Status: She is alert and oriented to person, place, and time     Psychiatric:         Mood and Affect: Mood normal          Behavior: Behavior normal          Vital Signs  ED Triage Vitals   Temperature Pulse Respirations Blood Pressure SpO2   02/16/22 2130 02/16/22 2130 02/16/22 2130 02/16/22 2133 02/16/22 2130   (!) 96 9 °F (36 1 °C) 76 20 162/71 99 %      Temp Source Heart Rate Source Patient Position - Orthostatic VS BP Location FiO2 (%)   02/16/22 2130 02/16/22 2130 02/16/22 2133 02/16/22 2133 --   Tympanic Monitor Lying Right arm       Pain Score       02/16/22 2130       10 - Worst Possible Pain           Vitals:    02/16/22 2130 02/16/22 2133   BP:  162/71   Pulse: 76    Patient Position - Orthostatic VS:  Lying         Visual Acuity      ED Medications  Medications   HYDROmorphone (DILAUDID) injection 0 5 mg (0 5 mg Intravenous Given 2/16/22 2156)   ondansetron (ZOFRAN) injection 4 mg (4 mg Intravenous Given 2/16/22 2340)   HYDROmorphone (DILAUDID) injection 0 5 mg (0 5 mg Intravenous Given 2/16/22 2340)   ondansetron (ZOFRAN) injection 4 mg (4 mg Intravenous Given 2/17/22 0043)       Diagnostic Studies  Results Reviewed     Procedure Component Value Units Date/Time    Comprehensive metabolic panel [[id number]]  (Abnormal) Collected: 02/16/22 2335    Lab Status: Final result Specimen: Blood from Arm, Right Updated: 02/17/22 0009     Sodium 142 mmol/L      Potassium 4 8 mmol/L      Chloride 103 mmol/L      CO2 29 mmol/L      ANION GAP 10 mmol/L      BUN 39 mg/dL      Creatinine 1 52 mg/dL      Glucose 305 mg/dL      Calcium 8 1 mg/dL      Corrected Calcium 8 7 mg/dL      AST 17 U/L      ALT 27 U/L      Alkaline Phosphatase 74 U/L      Total Protein 6 9 g/dL      Albumin 3 2 g/dL      Total Bilirubin 0 38 mg/dL      eGFR 34 ml/min/1 73sq m     Narrative:      [place] guidelines for Chronic Kidney Disease (CKD):     Stage 1 with normal or high GFR (GFR > 90 mL/min/1 73 square meters)    Stage 2 Mild CKD (GFR = 60-89 mL/min/1 73 square meters)    Stage 3A Moderate CKD (GFR = 45-59 mL/min/1 73 square meters)    Stage 3B Moderate CKD (GFR = 30-44 mL/min/1 73 square meters)    Stage 4 Severe CKD (GFR = 15-29 mL/min/1 73 square meters)    Stage 5 End Stage CKD (GFR <15 mL/min/1 73 square meters)  Note: GFR calculation is accurate only with a steady state creatinine    Protime-INR [[id number]]  (Normal) Collected: 02/16/22 2335    Lab Status: Final result Specimen: Blood from Arm, Right Updated: 02/16/22 2355     Protime 14 3 seconds      INR 1 13    APTT [[id number]]  (Normal) Collected: 02/16/22 2335    Lab Status: Final result Specimen: Blood from Arm, Right Updated: 02/16/22 2355     PTT 31 seconds     CBC and differential [[id number]]  (Abnormal) Collected: 02/16/22 2335    Lab Status: Final result Specimen: Blood from Arm, Right Updated: 02/16/22 2340     WBC 11 92 Thousand/uL RBC 4 00 Million/uL      Hemoglobin 11 7 g/dL      Hematocrit 36 7 %      MCV 92 fL      MCH 29 3 pg      MCHC 31 9 g/dL      RDW 15 1 %      MPV 11 9 fL      Platelets 143 Thousands/uL      nRBC 0 /100 WBCs      Neutrophils Relative 68 %      Immat GRANS % 1 %      Lymphocytes Relative 21 %      Monocytes Relative 8 %      Eosinophils Relative 1 %      Basophils Relative 1 %      Neutrophils Absolute 8 06 Thousands/µL      Immature Grans Absolute 0 09 Thousand/uL      Lymphocytes Absolute 2 50 Thousands/µL      Monocytes Absolute 0 99 Thousand/µL      Eosinophils Absolute 0 16 Thousand/µL      Basophils Absolute 0 12 Thousands/µL                  CT upper extremity wo contrast left   Final Result by Colleen Uribe MD (02/17 0025)      Extensively comminuted fracture at the surgical neck of left proximal humerus as detailed above  Glenohumeral joint appears intact  Workstation performed: [id number]         XR shoulder 2+ views LEFT    (Results Pending)   XR scapula LEFT    (Results Pending)              Procedures  Procedures         ED Course                                             MDM  Number of Diagnoses or Management Options  Displaced fracture of left humerus  Fall, initial encounter  Diagnosis management comments: Discussed with Dr Hipolito Neal who reviewed xrays as well  He advised pt  Can be discharged, she will need surgery but it won't be able to be done until next week  He will see her in office on Friday to schedule it  Advised can do CT of the fracture, sling, pain medicine  56 - discussed discharge with pt  She would like to go home but is still having pain, nausea, vomiting despite being medicated  Pt  Is concerned she is not set up to take care of herself as well    Will admit for obs and try to get symptoms under control and PT/OT eval       Disposition  Final diagnoses:   Fall, initial encounter   Displaced fracture of left humerus     Time reflects when diagnosis was documented in both MDM as applicable and the Disposition within this note     Time User Action Codes Description Comment    5/69/2907 12:61 PM Sugey Landis Add [A65  XXXA] Fall, initial encounter     5/46/8243 81:66 PM Jackie AGUIRRE Add [[id number]] Displaced fracture of left humerus       ED Disposition     ED Disposition Condition Date/Time Comment    Admit Stable Thu Feb 17, 2022 12:44 AM Case was discussed with **Dr Rajinder Flynn, hospitalist* and the patient's admission status was agreed to be Admission Status: observation status to the service of Dr Curielhospitalist**   Follow-up Information     Follow up With Specialties Details Why Contact Info    Danny Mcfarland MD Orthopedic Surgery Schedule an appointment as soon as possible for a visit  for Friday morning [address] [id number] [phone]            Patient's Medications   Discharge Prescriptions    HYDROCODONE-ACETAMINOPHEN (NORCO) 5-325 MG PER TABLET    Take 1 tablet by mouth every 6 (six) hours as needed for pain for up to 10 days Max Daily Amount: 4 tablets       Start Date: 2/16/2022 End Date: 2/26/2022       Order Dose: 1 tablet       Quantity: 15 tablet    Refills: 0       No discharge procedures on file      PDMP Review     None          ED Provider  Electronically Signed by           Apolonia Torrez MD  72/06/34 5927

## 2022-02-17 NOTE — H&P
[address] [facility] 1950, 73 y o  female MRN: [id number]  Unit/Bed#: [address] [place] [address] Encounter: [id number]  Primary Care Provider: Joana Macdonald   Date and time admitted to hospital: 2/16/2022  9:23 PM    * Humeral head fracture  Assessment & Plan  Patient sustained a mechanical fall when she bent over to reach the railing, landing on her left shoulder  CT of the left upper extremity revealed an extensively comminuted fracture at the surgical neck of the left proximal humerus  Case was discussed with surgery - plan is for outpatient repair next week  Patient states that she cannot ambulate safely  She had nausea and vomiting after receiving dilaudid in the ER  CT head negative  · Admit for observation  · PT/OT/PM    CKD (chronic kidney disease) stage 3, GFR 30-59 ml/min [facility])  Assessment & Plan  Lab Results   Component Value Date    EGFR 34 02/16/2022    EGFR 45 01/01/2020    EGFR 52 10/18/2019    CREATININE 1 52 (H) 02/16/2022    CREATININE 1 24 01/01/2020    CREATININE 1 10 10/18/2019   Baseline Cr 1 2-1 4   Cr slightly elevated at 1 5  Hold demadex  Trend     Essential hypertension  Assessment & Plan  Continue norvasc, lopressor    Mixed hyperlipidemia  Assessment & Plan  Continue statin     Paroxysmal atrial fibrillation (HCC)  Assessment & Plan  Continue metoprolol and eliquis     Diabetes mellitus type 2 in nonobese [facility])  Assessment & Plan  Lab Results   Component Value Date    HGBA1C 10 3 01/21/2020       No results for input(s): POCGLU in the last 72 hours  Blood Sugar Average: Last 72 hrs:  Continue lantus  Accuchecks AC/HS with insulin sliding scale     Asthma  Assessment & Plan  Continue albuterol, singulair     VTE Prophylaxis: Apixaban (Eliquis)  / sequential compression device   Code Status: Prior    Anticipated Length of Stay:  Patient will be admitted on an Observation basis with an anticipated length of stay of less than 2 midnights  Justification for Hospital Stay: fall, fracture     Total Time for Visit, including Counseling / Coordination of Care: 15 minutes  Greater than 50% of this total time spent on direct patient counseling and coordination of care  Chief Complaint:   Fall (Pt fell from step outside, reports landing on left shoulder and hearing/feeling a crack, reports no LOC, is on blood thinners  ) and Shoulder Injury      History of Present Illness:    Stephanie Valencia is a 73 y o  female with a PMH of atrial fibrillation, CKD, diabetes, hypertension, hyperlipidemia who presents with a fall  Patient sustained a mechanical fall when she bent over to reach the railing, landing on her left shoulder  CT of the left upper extremity revealed an extensively comminuted fracture at the surgical neck of the left proximal humerus  Case was discussed with surgery - plan is for outpatient repair next week  Patient states that she cannot ambulate safely  She had nausea and vomiting after receiving dilaudid in the ER  CT head negative  Review of Systems:    Review of Systems   Constitutional: Negative for chills and fever  HENT: Negative for ear pain and sore throat  Eyes: Negative for pain and visual disturbance  Respiratory: Negative for cough and shortness of breath  Cardiovascular: Negative for chest pain and palpitations  Gastrointestinal: Negative for abdominal pain and vomiting  Genitourinary: Negative for dysuria and hematuria  Musculoskeletal: Positive for joint swelling  Negative for arthralgias and back pain  Skin: Negative for color change and rash  Neurological: Negative for seizures and syncope  All other systems reviewed and are negative        Past Medical and Surgical History:     Past Medical History:   Diagnosis Date    A-fib ([facility] )     Asthma     CKD (chronic kidney disease)     Diabetes mellitus ([facility] )     GERD (gastroesophageal reflux disease)     Hyperlipidemia     Hypertension     Kidney stones     PONV (postoperative nausea and vomiting)     SVT (supraventricular tachycardia) ([facility])        Past Surgical History:   Procedure Laterality Date    APPENDECTOMY      CYSTOSCOPY W/ LASER LITHOTRIPSY Left 7/12/2016    Procedure: CYSTOSCOPY URETEROSCOPY WITH LITHOTRIPSY HOLMIUM LASER, RETROGRADE PYELOGRAM AND INSERTION STENT URETERAL;  Surgeon: Dave Rico MD;  Location: [address] [place] [address];  Service:     DILATION AND CURETTAGE OF UTERUS      JOINT REPLACEMENT      right knee    KNEE ARTHROPLASTY Right     OR CYSTOURETHROSCOPY,URETER CATHETER Left 6/29/2016    Procedure: CYSTOSCOPY RETROGRADE PYELOGRAM WITH INSERTION STENT URETERAL, left;  Surgeon: Dave Rico MD;  Location: [facility];  Service: Urology    SHOULDER ARTHROTOMY Left        Meds/Allergies:    Prior to Admission medications    Medication Sig Start Date End Date Taking?  Authorizing Provider   amLODIPine (NORVASC) 5 mg tablet Take 1 tablet (5 mg total) by mouth daily 8/2/19  Yes May Mitchell MD   ammonium lactate (LAC-HYDRIN) 12 % lotion APPLY TOPICALLY TO AFFECTED AREAS twice a day 12/16/21  Yes Historical Provider, MD   apixaban (ELIQUIS) 5 mg Take 1 tablet (5 mg total) by mouth 2 (two) times a day 5/30/19  Yes Kya Pickering MD   desloratadine (CLARINEX) 5 MG tablet Take by mouth   Yes Historical Provider, MD   Insulin Glargine (TOUJEO SOLOSTAR) injection pen 300 units/mL Inject 40 Units under the skin 2 (two) times a day     Yes Historical Provider, MD   metoprolol tartrate (LOPRESSOR) 25 mg tablet Take 2 tablets (50 mg total) by mouth every 12 (twelve) hours 9/10/19  Yes Balwinder Grant MD   montelukast (SINGULAIR) 10 mg tablet Take 10 mg by mouth daily     Yes Historical Provider, MD   pregabalin (LYRICA) 100 mg capsule Take 100 mg by mouth 2 (two) times a day    Yes Historical Provider, MD   rosuvastatin (CRESTOR) 10 MG tablet  8/29/19  Yes Historical Provider, MD   torsemide (DEMADEX) 10 mg tablet Take 1 tablet (10 mg total) by mouth daily for 30 days  Patient taking differently: Take 5 mg by mouth daily  8/3/19 2/17/22 Yes Jovana Singh MD   Trulicity 1 5 BS/8 2FG SOPN inject 0 5 milliliter subcutaneously every week 28 12/16/21  Yes Historical Provider, MD   albuterol (PROVENTIL HFA,VENTOLIN HFA) 90 mcg/act inhaler Inhale 2 puffs every 6 (six) hours as needed for wheezing    Historical Provider, MD   HYDROcodone-acetaminophen (Norco) 5-325 mg per tablet Take 1 tablet by mouth every 6 (six) hours as needed for pain for up to 10 days Max Daily Amount: 4 tablets 2/16/22 6/44/26  Fish Rivera MD   insulin lispro (HUMALOG) 100 units/mL injection Inject under the skin 3 (three) times a day before meals Inject 5 units subq every 8 hours before meals if fasting BS>125    Historical Provider, MD Alicia Suazo 30G X 8 MM MISC  8/25/19   Historical Provider, MD   cefuroxime (CEFTIN) 500 mg tablet Take 500 mg by mouth 2 (two) times a day 10/5/21 2/17/22  Historical Provider, MD   Cyanocobalamin 1000 MCG SUBL Place 1 tablet (1,000 mcg total) under the tongue daily  Patient not taking: Reported on 12/28/2021 8/1/19 2/17/22  Jovana Singh MD   ergocalciferol (ERGOCALCIFEROL) 1 25 MG (36786 UT) capsule Take 1 capsule (50,000 Units total) by mouth once a week  Patient not taking: Reported on 12/28/2021 11/12/19 2/17/22  Reynold camachoaiFENesin-codeine [facility]) 100-10 mg/5 mL oral solution take 5 milliliters by mouth every 6 hours for 10 days  Patient not taking: Reported on 12/28/2021 10/6/21 2/17/22  Historical Provider, MD   losartan (COZAAR) 25 mg tablet Take 25 mg by mouth daily  Patient not taking: Reported on 12/28/2021  10/11/21 2/17/22  Historical Provider, MD   pantoprazole (PROTONIX) 40 mg tablet Take 40 mg by mouth daily  Patient not taking: Reported on 12/28/2021 2/17/22  Historical Provider, MD   ranitidine (ZANTAC) 150 mg tablet Take 1 tablet (150 mg total) by mouth daily at bedtime  Patient not taking: Reported on 2021  Katlyn Nagy MD   saccharomyces boulardii (FLORASTOR) 250 mg capsule Take 1 capsule (250 mg total) by mouth 2 (two) times a day  Patient not taking: Reported on 2019  Salud Millan MD   sitaGLIPtin (JANUVIA) 50 mg tablet Take 1 tablet (50 mg total) by mouth daily  Patient not taking: Reported on 9/10/2019 8/1/19 2/17/22  Katlyn Nagy MD       Allergies: Allergies   Allergen Reactions    Asa [Aspirin] GI Intolerance    Indocin [Indomethacin] Other (See Comments)     Made patient "loopy"    Penicillins Hives    Percocet [Oxycodone-Acetaminophen]        Social History:     Marital Status: Single   Substance Use History:   Social History     Substance and Sexual Activity   Alcohol Use Not Currently    Comment: rarely     Social History     Tobacco Use   Smoking Status Former Smoker    Packs/day: 1 00    Years: 20 00    Pack years: 20 00    Types: Cigarettes    Quit date: 1996    Years since quittin 6   Smokeless Tobacco Never Used     Social History     Substance and Sexual Activity   Drug Use No       Family History:    Family History   Problem Relation Age of Onset    Heart disease Mother     Emphysema Maternal Grandmother     Heart disease Family        Physical Exam:     Vitals:   Blood Pressure: 151/70 (22)  Pulse: 100 (22)  Temperature: 97 6 °F (36 4 °C) (22)  Temp Source: Oral (22)  Respirations: 18 (22)  Height: 5' 2" (157 5 cm) (22)  Weight - Scale: 122 kg (268 lb) (22)  SpO2: 100 % (22)    Physical Exam  Vitals and nursing note reviewed  Constitutional:       Appearance: Normal appearance  HENT:      Head: Normocephalic  Nose: Nose normal    Eyes:      Extraocular Movements: Extraocular movements intact  Cardiovascular:      Rate and Rhythm: Normal rate and regular rhythm        Heart sounds: Murmur heard        Pulmonary:      Effort: Pulmonary effort is normal  No respiratory distress  Breath sounds: Normal breath sounds  No wheezing  Abdominal:      General: Abdomen is flat  Bowel sounds are normal  There is no distension  Palpations: Abdomen is soft  Tenderness: There is no abdominal tenderness  Musculoskeletal:      Comments: Left arm in sling, moves fingers, warm, 2+ pulses    Skin:     General: Skin is warm and dry  Neurological:      General: No focal deficit present  Mental Status: She is alert and oriented to person, place, and time  Psychiatric:         Mood and Affect: Mood normal          Behavior: Behavior normal            Additional Data:     Lab Results: I have personally reviewed pertinent reports  Results from last 7 days   Lab Units 02/16/22  2335   WBC Thousand/uL 11 92*   HEMOGLOBIN g/dL 11 7   HEMATOCRIT % 36 7   PLATELETS Thousands/uL 188   NEUTROS PCT % 68     Results from last 7 days   Lab Units 02/16/22  2335   SODIUM mmol/L 142   POTASSIUM mmol/L 4 8   CHLORIDE mmol/L 103   CO2 mmol/L 29   BUN mg/dL 39*   CREATININE mg/dL 1 52*   CALCIUM mg/dL 8 1*   TOTAL BILIRUBIN mg/dL 0 38   ALK PHOS U/L 74   ALT U/L 27   AST U/L 17     Results from last 7 days   Lab Units 02/16/22  2335   INR  1 13                   Imaging: I have personally reviewed pertinent reports  CT head wo contrast   Final Result by Tammy Srinivasan MD (02/17 0330)      No acute intracranial abnormality  Mild chronic small vessel ischemic changes  Workstation performed: [id number]         CT upper extremity wo contrast left   Final Result by Cece Cottrell MD (02/17 0025)      Extensively comminuted fracture at the surgical neck of left proximal humerus as detailed above  Glenohumeral joint appears intact           Workstation performed: [id number]         XR shoulder 2+ views LEFT    (Results Pending)   XR scapula LEFT    (Results Pending)       CT head wo contrast Final Result      No acute intracranial abnormality  Mild chronic small vessel ischemic changes  Workstation performed: [id number]         CT upper extremity wo contrast left   Final Result      Extensively comminuted fracture at the surgical neck of left proximal humerus as detailed above  Glenohumeral joint appears intact  Workstation performed: [id number]         XR shoulder 2+ views LEFT    (Results Pending)   XR scapula LEFT    (Results Pending)       EKG, Pathology, and Other Studies Reviewed on Admission:   · EKG: not done     Allscripts / Epic Records Reviewed: Yes     ** Please Note: This note has been constructed using a voice recognition system   **

## 2022-02-17 NOTE — PROGRESS NOTES
Chief Complaint     Left shoulder pain      History of the Present Illness     Stacy Salvador is a 73 y o  female who presented to the ED overnight for left shoulder pain after a mechanical fall bending over to reach a railing ***          Past Medical History:   Diagnosis Date    [facility] )     Asthma     CKD (chronic kidney disease)     Diabetes mellitus ([facility] )     GERD (gastroesophageal reflux disease)     Hyperlipidemia     Hypertension     Kidney stones     PONV (postoperative nausea and vomiting)     SVT [facility]supra[facility]ricular tachycardia) ([facility])        Past Surgical History:   Procedure Laterality Date    APPENDECTOMY      CYSTOSCOPY W/ LASER LITHOTRIPSY Left 7/12/2016    Procedure: CYSTOSCOPY URETEROSCOPY WITH LITHOTRIPSY HOLMIUM LASER, RETROGRADE PYELOGRAM AND INSERTION STENT URETERAL;  Surgeon: Maria R العراقي MD;  Location: [address] [place] [address];  Service:     DILATION AND CURETTAGE OF UTERUS      JOINT REPLACEMENT      right knee    KNEE ARTHROPLASTY Right     DE CYSTOURETHROSCOPY,URETER CATHETER Left 6/29/2016    Procedure: CYSTOSCOPY RETROGRADE PYELOGRAM WITH INSERTION STENT URETERAL, left;  Surgeon: Maria R العراقي MD;  Location: [facility];  Service: Urology    SHOULDER ARTHROTOMY Left        Allergies   Allergen Reactions    Asa [Aspirin] GI Intolerance    Indocin [Indomethacin] Other (See Comments)     Made patient "loopy"    Penicillins Hives    Percocet [Oxycodone-Acetaminophen]        No current facility-administered medications on file prior to encounter       Current Outpatient Medications on File Prior to Encounter   Medication Sig Dispense Refill    amLODIPine (NORVASC) 5 mg tablet Take 1 tablet (5 mg total) by mouth daily 30 tablet 0    ammonium lactate (LAC-HYDRIN) 12 % lotion APPLY TOPICALLY TO AFFECTED AREAS twice a day      apixaban (ELIQUIS) 5 mg Take 1 tablet (5 mg total) by mouth 2 (two) times a day 60 tablet 0    desloratadine (CLARINEX) 5 MG tablet Take by mouth      Insulin Glargine (TOUJEO SOLOSTAR) injection pen 300 units/mL Inject 40 Units under the skin 2 (two) times a day        metoprolol tartrate (LOPRESSOR) 25 mg tablet Take 2 tablets (50 mg total) by mouth every 12 (twelve) hours 180 tablet 3    montelukast (SINGULAIR) 10 mg tablet Take 10 mg by mouth daily        pregabalin (LYRICA) 100 mg capsule Take 100 mg by mouth 2 (two) times a day       rosuvastatin (CRESTOR) 10 MG tablet   0    torsemide (DEMADEX) 10 mg tablet Take 1 tablet (10 mg total) by mouth daily for 30 days (Patient taking differently: Take 5 mg by mouth daily ) 30 tablet 0    Trulicity 1 5 FE/1 4CZ SOPN inject 0 5 milliliter subcutaneously every week 28      albuterol (PROVENTIL HFA,VENTOLIN HFA) 90 mcg/act inhaler Inhale 2 puffs every 6 (six) hours as needed for wheezing      insulin lispro (HUMALOG) 100 units/mL injection Inject under the skin 3 (three) times a day before meals Inject 5 units subq every 8 hours before meals if fasting BS>125      NOVOFINE AUTOCOVER 30G X 8 MM MISC   0    [DISCONTINUED] cefuroxime (CEFTIN) 500 mg tablet Take 500 mg by mouth 2 (two) times a day      [DISCONTINUED] Cyanocobalamin 1000 MCG SUBL Place 1 tablet (1,000 mcg total) under the tongue daily (Patient not taking: Reported on 12/28/2021 ) 60 tablet 0    [DISCONTINUED] ergocalciferol (ERGOCALCIFEROL) 1 25 MG (88495 UT) capsule Take 1 capsule (50,000 Units total) by mouth once a week (Patient not taking: Reported on 12/28/2021 ) 12 capsule 0    [DISCONTINUED] guaiFENesin-codeine (ROBITUSSIN AC) 100-10 mg/5 mL oral solution take 5 milliliters by mouth every 6 hours for 10 days (Patient not taking: Reported on 12/28/2021)      [DISCONTINUED] losartan (COZAAR) 25 mg tablet Take 25 mg by mouth daily (Patient not taking: Reported on 12/28/2021 )      [DISCONTINUED] pantoprazole (PROTONIX) 40 mg tablet Take 40 mg by mouth daily (Patient not taking: Reported on 12/28/2021 )      [DISCONTINUED] ranitidine (ZANTAC) 150 mg tablet Take 1 tablet (150 mg total) by mouth daily at bedtime (Patient not taking: Reported on 2021 )  0    [DISCONTINUED] saccharomyces boulardii (FLORASTOR) 250 mg capsule Take 1 capsule (250 mg total) by mouth 2 (two) times a day (Patient not taking: Reported on 2019) 10 capsule 0    [DISCONTINUED] sitaGLIPtin (JANUVIA) 50 mg tablet Take 1 tablet (50 mg total) by mouth daily (Patient not taking: Reported on 9/10/2019) 30 tablet 0       Social History     Tobacco Use    Smoking status: Former Smoker     Packs/day:  00     Years: 20 00     Pack years: 20 00     Types: Cigarettes     Quit date: 1996     Years since quittin 6    Smokeless tobacco: Never Used   Substance Use Topics    Alcohol use: Not Currently     Comment: rarely    Drug use: No       Family History   Problem Relation Age of Onset    Heart disease Mother     Emphysema Maternal Grandmother     Heart disease Family        Review of Systems     As stated in the HPI  All other systems were reviewed and are negative  Physical Exam     /51   Pulse 82   Temp 98 2 °F (36 8 °C)   Resp 15   Ht 5' 2" (1 575 m)   Wt 122 kg (268 lb)   SpO2 100%   BMI 49 02 kg/m²     GENERAL: This is a well-developed, well-nourished, age-appropriate patient in no acute distress  The patient is alert and oriented x3  Pleasant and cooperative  Eyes: Anicteric sclerae  Extraocular movements appear intact  HENT: Nares are patent with no drainage  Lungs: There is equal chest rise on inspection  Breathing is non-labored with no audible wheezing  Cardiovascular: No cyanosis  No upper extremity lymphadema  Skin: Skin is warm to touch  No obvious skin lesions or rashes other than described below  Neurologic: No ataxia  Psychiatric: Mood and affect are appropriate      Left Upper Extremity:  ***    Data Review     Results Reviewed     Procedure Component Value Units Date/Time    Comprehensive metabolic panel [[id number]] (Abnormal) Collected: 02/16/22 2335    Lab Status: Final result Specimen: Blood from Arm, Right Updated: 02/17/22 0009     Sodium 142 mmol/L      Potassium 4 8 mmol/L      Chloride 103 mmol/L      CO2 29 mmol/L      ANION GAP 10 mmol/L      BUN 39 mg/dL      Creatinine 1 52 mg/dL      Glucose 305 mg/dL      Calcium 8 1 mg/dL      Corrected Calcium 8 7 mg/dL      AST 17 U/L      ALT 27 U/L      Alkaline Phosphatase 74 U/L      Total Protein 6 9 g/dL      Albumin 3 2 g/dL      Total Bilirubin 0 38 mg/dL      eGFR 34 ml/min/1 73sq m     Narrative:      National Kidney Disease Foundation guidelines for Chronic Kidney Disease (CKD):     Stage 1 with normal or high GFR (GFR > 90 mL/min/1 73 square meters)    Stage 2 Mild CKD (GFR = 60-89 mL/min/1 73 square meters)    Stage 3A Moderate CKD (GFR = 45-59 mL/min/1 73 square meters)    Stage 3B Moderate CKD (GFR = 30-44 mL/min/1 73 square meters)    Stage 4 Severe CKD (GFR = 15-29 mL/min/1 73 square meters)    Stage 5 End Stage CKD (GFR <15 mL/min/1 73 square meters)  Note: GFR calculation is accurate only with a steady state creatinine    Protime-INR [[id number]]  (Normal) Collected: 02/16/22 2335    Lab Status: Final result Specimen: Blood from Arm, Right Updated: 02/16/22 2355     Protime 14 3 seconds      INR 1 13    APTT [[id number]]  (Normal) Collected: 02/16/22 2335    Lab Status: Final result Specimen: Blood from Arm, Right Updated: 02/16/22 2355     PTT 31 seconds     CBC and differential [[id number]]  (Abnormal) Collected: 02/16/22 2335    Lab Status: Final result Specimen: Blood from Arm, Right Updated: 02/16/22 2340     WBC 11 92 Thousand/uL      RBC 4 00 Million/uL      Hemoglobin 11 7 g/dL      Hematocrit 36 7 %      MCV 92 fL      MCH 29 3 pg      MCHC 31 9 g/dL      RDW 15 1 %      MPV 11 9 fL      Platelets 342 Thousands/uL      nRBC 0 /100 WBCs      Neutrophils Relative 68 %      Immat GRANS % 1 %      Lymphocytes Relative 21 %      Monocytes Relative 8 % Eosinophils Relative 1 %      Basophils Relative 1 %      Neutrophils Absolute 8 06 Thousands/µL      Immature Grans Absolute 0 09 Thousand/uL      Lymphocytes Absolute 2 50 Thousands/µL      Monocytes Absolute 0 99 Thousand/µL      Eosinophils Absolute 0 16 Thousand/µL      Basophils Absolute 0 12 Thousands/µL              Imaging:  Xrays and CT of the left upper extremity were reviewed in PACS and demonstrate a displaced comminuted fracture along the surgical neck of the left proximal humerus  The glenohumeral joint appears intact  Assessment and Plan     73year old female with left proximal humerus fracture       ***

## 2022-02-18 ENCOUNTER — APPOINTMENT (INPATIENT)
Dept: RADIOLOGY | Facility: HOSPITAL | Age: 72
DRG: 563 | End: 2022-02-18
Payer: MEDICARE

## 2022-02-18 ENCOUNTER — TELEPHONE (OUTPATIENT)
Dept: OBGYN CLINIC | Facility: OTHER | Age: 72
End: 2022-02-18

## 2022-02-18 LAB
ANION GAP SERPL CALCULATED.3IONS-SCNC: 9 MMOL/L (ref 4–13)
BASOPHILS # BLD AUTO: 0.08 THOUSANDS/ΜL (ref 0–0.1)
BASOPHILS NFR BLD AUTO: 1 % (ref 0–1)
BUN SERPL-MCNC: 65 MG/DL (ref 5–25)
CALCIUM SERPL-MCNC: 7.5 MG/DL (ref 8.3–10.1)
CHLORIDE SERPL-SCNC: 102 MMOL/L (ref 100–108)
CO2 SERPL-SCNC: 25 MMOL/L (ref 21–32)
CREAT SERPL-MCNC: 3.32 MG/DL (ref 0.6–1.3)
EOSINOPHIL # BLD AUTO: 0.08 THOUSAND/ΜL (ref 0–0.61)
EOSINOPHIL NFR BLD AUTO: 1 % (ref 0–6)
ERYTHROCYTE [DISTWIDTH] IN BLOOD BY AUTOMATED COUNT: 15.7 % (ref 11.6–15.1)
GFR SERPL CREATININE-BSD FRML MDRD: 13 ML/MIN/1.73SQ M
GLUCOSE SERPL-MCNC: 115 MG/DL (ref 65–140)
GLUCOSE SERPL-MCNC: 117 MG/DL (ref 65–140)
GLUCOSE SERPL-MCNC: 165 MG/DL (ref 65–140)
GLUCOSE SERPL-MCNC: 241 MG/DL (ref 65–140)
GLUCOSE SERPL-MCNC: 304 MG/DL (ref 65–140)
HCT VFR BLD AUTO: 27.6 % (ref 34.8–46.1)
HGB BLD-MCNC: 8.5 G/DL (ref 11.5–15.4)
IMM GRANULOCYTES # BLD AUTO: 0.08 THOUSAND/UL (ref 0–0.2)
IMM GRANULOCYTES NFR BLD AUTO: 1 % (ref 0–2)
LYMPHOCYTES # BLD AUTO: 2.73 THOUSANDS/ΜL (ref 0.6–4.47)
LYMPHOCYTES NFR BLD AUTO: 25 % (ref 14–44)
MCH RBC QN AUTO: 29 PG (ref 26.8–34.3)
MCHC RBC AUTO-ENTMCNC: 30.8 G/DL (ref 31.4–37.4)
MCV RBC AUTO: 94 FL (ref 82–98)
MONOCYTES # BLD AUTO: 1.07 THOUSAND/ΜL (ref 0.17–1.22)
MONOCYTES NFR BLD AUTO: 10 % (ref 4–12)
NEUTROPHILS # BLD AUTO: 6.81 THOUSANDS/ΜL (ref 1.85–7.62)
NEUTS SEG NFR BLD AUTO: 62 % (ref 43–75)
NRBC BLD AUTO-RTO: 0 /100 WBCS
PLATELET # BLD AUTO: 168 THOUSANDS/UL (ref 149–390)
PMV BLD AUTO: 11.4 FL (ref 8.9–12.7)
POTASSIUM SERPL-SCNC: 5.7 MMOL/L (ref 3.5–5.3)
RBC # BLD AUTO: 2.93 MILLION/UL (ref 3.81–5.12)
SODIUM SERPL-SCNC: 136 MMOL/L (ref 136–145)
WBC # BLD AUTO: 10.85 THOUSAND/UL (ref 4.31–10.16)

## 2022-02-18 PROCEDURE — 73070 X-RAY EXAM OF ELBOW: CPT

## 2022-02-18 PROCEDURE — 85025 COMPLETE CBC W/AUTO DIFF WBC: CPT | Performed by: STUDENT IN AN ORGANIZED HEALTH CARE EDUCATION/TRAINING PROGRAM

## 2022-02-18 PROCEDURE — 80048 BASIC METABOLIC PNL TOTAL CA: CPT | Performed by: STUDENT IN AN ORGANIZED HEALTH CARE EDUCATION/TRAINING PROGRAM

## 2022-02-18 PROCEDURE — 82948 REAGENT STRIP/BLOOD GLUCOSE: CPT

## 2022-02-18 PROCEDURE — 99232 SBSQ HOSP IP/OBS MODERATE 35: CPT | Performed by: STUDENT IN AN ORGANIZED HEALTH CARE EDUCATION/TRAINING PROGRAM

## 2022-02-18 RX ORDER — ONDANSETRON 2 MG/ML
4 INJECTION INTRAMUSCULAR; INTRAVENOUS EVERY 6 HOURS PRN
Status: DISCONTINUED | OUTPATIENT
Start: 2022-02-18 | End: 2022-02-20 | Stop reason: HOSPADM

## 2022-02-18 RX ORDER — TRAMADOL HYDROCHLORIDE 50 MG/1
50 TABLET ORAL EVERY 6 HOURS PRN
Status: DISCONTINUED | OUTPATIENT
Start: 2022-02-18 | End: 2022-02-20 | Stop reason: HOSPADM

## 2022-02-18 RX ORDER — SODIUM CHLORIDE 9 MG/ML
100 INJECTION, SOLUTION INTRAVENOUS CONTINUOUS
Status: DISCONTINUED | OUTPATIENT
Start: 2022-02-18 | End: 2022-02-20 | Stop reason: HOSPADM

## 2022-02-18 RX ORDER — SODIUM POLYSTYRENE SULFONATE 4.1 MEQ/G
30 POWDER, FOR SUSPENSION ORAL; RECTAL ONCE
Status: COMPLETED | OUTPATIENT
Start: 2022-02-18 | End: 2022-02-18

## 2022-02-18 RX ADMIN — METOPROLOL TARTRATE 50 MG: 50 TABLET, FILM COATED ORAL at 09:37

## 2022-02-18 RX ADMIN — INSULIN LISPRO 8 UNITS: 100 INJECTION, SOLUTION INTRAVENOUS; SUBCUTANEOUS at 23:47

## 2022-02-18 RX ADMIN — INSULIN LISPRO 12 UNITS: 100 INJECTION, SOLUTION INTRAVENOUS; SUBCUTANEOUS at 16:13

## 2022-02-18 RX ADMIN — TRAMADOL HYDROCHLORIDE 50 MG: 50 TABLET, COATED ORAL at 16:12

## 2022-02-18 RX ADMIN — ACETAMINOPHEN 975 MG: 325 TABLET, FILM COATED ORAL at 13:07

## 2022-02-18 RX ADMIN — PRAVASTATIN SODIUM 80 MG: 80 TABLET ORAL at 16:12

## 2022-02-18 RX ADMIN — INSULIN LISPRO 4 UNITS: 100 INJECTION, SOLUTION INTRAVENOUS; SUBCUTANEOUS at 12:23

## 2022-02-18 RX ADMIN — ONDANSETRON 4 MG: 2 INJECTION INTRAMUSCULAR; INTRAVENOUS at 16:16

## 2022-02-18 RX ADMIN — PREGABALIN 100 MG: 100 CAPSULE ORAL at 09:06

## 2022-02-18 RX ADMIN — ACETAMINOPHEN 975 MG: 325 TABLET, FILM COATED ORAL at 05:16

## 2022-02-18 RX ADMIN — PREGABALIN 100 MG: 100 CAPSULE ORAL at 16:13

## 2022-02-18 RX ADMIN — AMLODIPINE BESYLATE 5 MG: 5 TABLET ORAL at 09:36

## 2022-02-18 RX ADMIN — INSULIN GLARGINE 40 UNITS: 100 INJECTION, SOLUTION SUBCUTANEOUS at 09:07

## 2022-02-18 RX ADMIN — MONTELUKAST SODIUM 10 MG: 10 TABLET, FILM COATED ORAL at 09:05

## 2022-02-18 RX ADMIN — Medication 1 APPLICATION: at 09:55

## 2022-02-18 RX ADMIN — LORATADINE 10 MG: 10 TABLET ORAL at 09:06

## 2022-02-18 RX ADMIN — SODIUM CHLORIDE 100 ML/HR: 0.9 INJECTION, SOLUTION INTRAVENOUS at 09:38

## 2022-02-18 RX ADMIN — ACETAMINOPHEN 975 MG: 325 TABLET, FILM COATED ORAL at 23:36

## 2022-02-18 RX ADMIN — APIXABAN 5 MG: 5 TABLET, FILM COATED ORAL at 09:06

## 2022-02-18 RX ADMIN — INSULIN GLARGINE 40 UNITS: 100 INJECTION, SOLUTION SUBCUTANEOUS at 23:46

## 2022-02-18 RX ADMIN — APIXABAN 5 MG: 5 TABLET, FILM COATED ORAL at 16:12

## 2022-02-18 RX ADMIN — LIDOCAINE 5% 2 PATCH: 700 PATCH TOPICAL at 09:07

## 2022-02-18 RX ADMIN — SODIUM POLYSTYRENE SULFONATE 30 G: 1 POWDER ORAL; RECTAL at 09:38

## 2022-02-18 RX ADMIN — METOPROLOL TARTRATE 50 MG: 50 TABLET, FILM COATED ORAL at 23:36

## 2022-02-18 NOTE — PLAN OF CARE
Problem: MOBILITY - ADULT  Goal: Maintain or return to baseline ADL function  Description: INTERVENTIONS:  -  Assess patient's ability to carry out ADLs; assess patient's baseline for ADL function and identify physical deficits which impact ability to perform ADLs (bathing, care of mouth/teeth, toileting, grooming, dressing, etc )  - Assess/evaluate cause of self-care deficits   - Assess range of motion  - Assess patient's mobility; develop plan if impaired  - Assess patient's need for assistive devices and provide as appropriate  - Encourage maximum independence but intervene and supervise when necessary  - Involve family in performance of ADLs  - Assess for home care needs following discharge   - Consider OT consult to assist with ADL evaluation and planning for discharge  - Provide patient education as appropriate  Outcome: Progressing  Goal: Maintains/Returns to pre admission functional level  Description: INTERVENTIONS:  - Perform BMAT or MOVE assessment daily    - Set and communicate daily mobility goal to care team and patient/family/caregiver     - Collaborate with rehabilitation services on mobility goals if consulted  -  Problem: MUSCULOSKELETAL - ADULT  Goal: Maintain or return mobility to safest level of function  Description: INTERVENTIONS:  - Assess patient's ability to carry out ADLs; assess patient's baseline for ADL function and identify physical deficits which impact ability to perform ADLs (bathing, care of mouth/teeth, toileting, grooming, dressing, etc )  - Assess/evaluate cause of self-care deficits   - Assess range of motion  - Assess patient's mobility  - Assess patient's need for assistive devices and provide as appropriate  - Encourage maximum independence but intervene and supervise when necessary  - Involve family in performance of ADLs  - Assess for home care needs following discharge   - Consider OT consult to assist with ADL evaluation and planning for discharge  - Provide patient education as appropriate  Outcome: Progressing  Goal: Maintain proper alignment of affected body part  Description: INTERVENTIONS:  - Support, maintain and protect limb and body alignment  - Provide patient/ family with appropriate education  Outcome: Progressing   - Out of bed for toileting  - Record patient progress and toleration of activity level   Outcome: Progressing

## 2022-02-18 NOTE — TELEPHONE ENCOUNTER
Email sent to [facility] , [facility], [address] and [employer]!!    I have a patient that is in need of a Shoulder Specialist     Her Niece is requesting Dr Ulises Gabriel or Dr Verónica Huber  Ed note says follow up in a week from today , so with Dr Ulises Gabriel not being in on Thursday I think Dr Verónica Huber is our best option    Patient is  Edna Harmon   : 57[birth year]  MRN : [id number]  C/b # [phone]  Reason for Appointment : Displaced Shoulder Fracture  Requested Doctor & Location : Dr Verónica Huber or Dr Ulises Gabreil    Thank you!     Melony Adame

## 2022-02-18 NOTE — CASE MANAGEMENT
Case Management Discharge Planning Note    Patient name Janis Beckford  Location [address]-* MRN [id number]  : 1950 Date 2022       Current Admission Date: 2022  Current Admission Diagnosis:Humeral head fracture   Patient Active Problem List    Diagnosis Date Noted    Humeral head fracture 2022    Acute kidney failure, unspecified ([facility] ) 2019    CKD (chronic kidney disease) stage 3, GFR 30-59 ml/min ([place] [address] ) 2019    Essential hypertension 2019    Anemia 2019    Mixed hyperlipidemia 2019    Paroxysmal atrial fibrillation ([place] [address] ) 2019    Lower leg edema 2019    Asthma 2018    Diabetes mellitus type 2 in nonobese ([place] [address] ) 2018    Morbid obesity with BMI of 45 0-49 9, adult ([place] [address] ) 2018      LOS (days): 1  Geometric Mean LOS (GMLOS) (days): 3 00  Days to GMLOS:2 1     OBJECTIVE:  Risk of Unplanned Readmission Score: 14         Current admission status: Inpatient   Preferred Pharmacy:   [facility] [place] [id number]  Phone: [phone] Fax: [phone]    Primary Care Provider: Gregoria Mabry    Primary Insurance: MEDICARE  Secondary Insurance: [employer]    DISCHARGE DETAILS:       Freedom of Choice: Yes     CM contacted family/caregiver?: Yes  Were Treatment Team discharge recommendations reviewed with patient/caregiver?: Yes  Did patient/caregiver verbalize understanding of patient care needs?: Yes  Were patient/caregiver advised of the risks associated with not following Treatment Team discharge recommendations?: Yes    Contacts  Patient Contacts: Edson Rosamariamarlee (Relative) [phone] (M)  Relationship to Patient[de-identified] Family  Contact Method: Phone  Phone Number: Edson Boston (Relative) [phone] Carly Shultz  Reason/Outcome: Discharge [address]         Is the patient interested in [facility] at discharge?: No    DME Referral Provided  Referral made for DME?: No         Would you like to participate in our [address] service program?  : No - Declined    Treatment Team Recommendation: Short Term Rehab  Discharge Destination Plan[de-identified] Short Term Rehab         IMM Given (Date):: 02/18/22 (reviewed with pt, pt is agreeable to d/c imm placed in scan bin)  IMM Given to[de-identified] Patient          Accepting Facility Name, [facility] : [address]  Receiving Facility/Agency Phone Number: [phone]       CM updated pt with care plan, and accepting facility [facility] will possibly have an open bed on Sunday  Voicemail left message on contact's Ed Lombardi (Relative) [phone] (M) to call Cm back    Cm to follow

## 2022-02-18 NOTE — PROGRESS NOTES
Pastoral Care Progress Note    2022  Patient: Reuben Croft : 1950  Admission Date & Time: 2022  MRN: [id number] [facility]: [id number]                     Chaplaincy Interventions Utilized:   Relationship Building: Cultivated a relationship of care and support  Patient said her shoulder injury may be the toughest experience she has ever been through, and she said she has experienced a lot of pain in her life previously  She said she got quite depressed this morning, but she began reflecting on her nate and it helped her  Her nate is a great source of hope and strength for her  She appreciated the visit and prayer     Ritual: Provided prayer and Read sacred text     22 1500   Clinical Encounter Type   Visited With Patient   Routine Visit Introduction   Referral From Nurse   Referral To [address] Text;Prayer

## 2022-02-19 PROBLEM — N18.9 ACUTE KIDNEY INJURY SUPERIMPOSED ON CKD (HCC): Status: ACTIVE | Noted: 2019-08-08

## 2022-02-19 PROBLEM — N17.9 ACUTE KIDNEY INJURY SUPERIMPOSED ON CKD (HCC): Status: ACTIVE | Noted: 2019-08-08

## 2022-02-19 LAB
ANION GAP SERPL CALCULATED.3IONS-SCNC: 7 MMOL/L (ref 4–13)
BASOPHILS # BLD AUTO: 0.1 THOUSANDS/ΜL (ref 0–0.1)
BASOPHILS NFR BLD AUTO: 1 % (ref 0–1)
BUN SERPL-MCNC: 69 MG/DL (ref 5–25)
CALCIUM SERPL-MCNC: 6.8 MG/DL (ref 8.3–10.1)
CHLORIDE SERPL-SCNC: 104 MMOL/L (ref 100–108)
CO2 SERPL-SCNC: 28 MMOL/L (ref 21–32)
CREAT SERPL-MCNC: 2.47 MG/DL (ref 0.6–1.3)
EOSINOPHIL # BLD AUTO: 0.18 THOUSAND/ΜL (ref 0–0.61)
EOSINOPHIL NFR BLD AUTO: 2 % (ref 0–6)
ERYTHROCYTE [DISTWIDTH] IN BLOOD BY AUTOMATED COUNT: 15.5 % (ref 11.6–15.1)
GFR SERPL CREATININE-BSD FRML MDRD: 19 ML/MIN/1.73SQ M
GLUCOSE SERPL-MCNC: 119 MG/DL (ref 65–140)
GLUCOSE SERPL-MCNC: 134 MG/DL (ref 65–140)
GLUCOSE SERPL-MCNC: 299 MG/DL (ref 65–140)
GLUCOSE SERPL-MCNC: 426 MG/DL (ref 65–140)
GLUCOSE SERPL-MCNC: 89 MG/DL (ref 65–140)
HCT VFR BLD AUTO: 24.7 % (ref 34.8–46.1)
HGB BLD-MCNC: 7.9 G/DL (ref 11.5–15.4)
IMM GRANULOCYTES # BLD AUTO: 0.1 THOUSAND/UL (ref 0–0.2)
IMM GRANULOCYTES NFR BLD AUTO: 1 % (ref 0–2)
LYMPHOCYTES # BLD AUTO: 2.73 THOUSANDS/ΜL (ref 0.6–4.47)
LYMPHOCYTES NFR BLD AUTO: 28 % (ref 14–44)
MCH RBC QN AUTO: 29.5 PG (ref 26.8–34.3)
MCHC RBC AUTO-ENTMCNC: 32 G/DL (ref 31.4–37.4)
MCV RBC AUTO: 92 FL (ref 82–98)
MONOCYTES # BLD AUTO: 0.93 THOUSAND/ΜL (ref 0.17–1.22)
MONOCYTES NFR BLD AUTO: 10 % (ref 4–12)
NEUTROPHILS # BLD AUTO: 5.75 THOUSANDS/ΜL (ref 1.85–7.62)
NEUTS SEG NFR BLD AUTO: 58 % (ref 43–75)
NRBC BLD AUTO-RTO: 0 /100 WBCS
PLATELET # BLD AUTO: 167 THOUSANDS/UL (ref 149–390)
PMV BLD AUTO: 11.4 FL (ref 8.9–12.7)
POTASSIUM SERPL-SCNC: 5.2 MMOL/L (ref 3.5–5.3)
RBC # BLD AUTO: 2.68 MILLION/UL (ref 3.81–5.12)
SODIUM SERPL-SCNC: 139 MMOL/L (ref 136–145)
WBC # BLD AUTO: 9.79 THOUSAND/UL (ref 4.31–10.16)

## 2022-02-19 PROCEDURE — 80048 BASIC METABOLIC PNL TOTAL CA: CPT | Performed by: STUDENT IN AN ORGANIZED HEALTH CARE EDUCATION/TRAINING PROGRAM

## 2022-02-19 PROCEDURE — 85025 COMPLETE CBC W/AUTO DIFF WBC: CPT | Performed by: STUDENT IN AN ORGANIZED HEALTH CARE EDUCATION/TRAINING PROGRAM

## 2022-02-19 PROCEDURE — 99231 SBSQ HOSP IP/OBS SF/LOW 25: CPT | Performed by: PHYSICIAN ASSISTANT

## 2022-02-19 PROCEDURE — 99232 SBSQ HOSP IP/OBS MODERATE 35: CPT | Performed by: STUDENT IN AN ORGANIZED HEALTH CARE EDUCATION/TRAINING PROGRAM

## 2022-02-19 PROCEDURE — 82948 REAGENT STRIP/BLOOD GLUCOSE: CPT

## 2022-02-19 RX ADMIN — PRAVASTATIN SODIUM 80 MG: 80 TABLET ORAL at 16:15

## 2022-02-19 RX ADMIN — PREGABALIN 100 MG: 100 CAPSULE ORAL at 17:31

## 2022-02-19 RX ADMIN — SODIUM CHLORIDE 100 ML/HR: 0.9 INJECTION, SOLUTION INTRAVENOUS at 00:20

## 2022-02-19 RX ADMIN — Medication 1 APPLICATION: at 08:23

## 2022-02-19 RX ADMIN — APIXABAN 5 MG: 5 TABLET, FILM COATED ORAL at 17:31

## 2022-02-19 RX ADMIN — LIDOCAINE 5% 2 PATCH: 700 PATCH TOPICAL at 08:21

## 2022-02-19 RX ADMIN — LORATADINE 10 MG: 10 TABLET ORAL at 08:21

## 2022-02-19 RX ADMIN — INSULIN GLARGINE 40 UNITS: 100 INJECTION, SOLUTION SUBCUTANEOUS at 20:58

## 2022-02-19 RX ADMIN — SODIUM CHLORIDE 100 ML/HR: 0.9 INJECTION, SOLUTION INTRAVENOUS at 21:00

## 2022-02-19 RX ADMIN — INSULIN GLARGINE 40 UNITS: 100 INJECTION, SOLUTION SUBCUTANEOUS at 08:21

## 2022-02-19 RX ADMIN — TRAMADOL HYDROCHLORIDE 50 MG: 50 TABLET, COATED ORAL at 20:54

## 2022-02-19 RX ADMIN — MONTELUKAST SODIUM 10 MG: 10 TABLET, FILM COATED ORAL at 08:21

## 2022-02-19 RX ADMIN — SODIUM CHLORIDE 100 ML/HR: 0.9 INJECTION, SOLUTION INTRAVENOUS at 10:19

## 2022-02-19 RX ADMIN — INSULIN LISPRO 12 UNITS: 100 INJECTION, SOLUTION INTRAVENOUS; SUBCUTANEOUS at 16:15

## 2022-02-19 RX ADMIN — PREGABALIN 100 MG: 100 CAPSULE ORAL at 08:21

## 2022-02-19 RX ADMIN — METOPROLOL TARTRATE 50 MG: 50 TABLET, FILM COATED ORAL at 20:55

## 2022-02-19 RX ADMIN — ACETAMINOPHEN 975 MG: 325 TABLET, FILM COATED ORAL at 14:14

## 2022-02-19 RX ADMIN — APIXABAN 5 MG: 5 TABLET, FILM COATED ORAL at 08:21

## 2022-02-19 RX ADMIN — ONDANSETRON 4 MG: 2 INJECTION INTRAMUSCULAR; INTRAVENOUS at 20:52

## 2022-02-19 RX ADMIN — ACETAMINOPHEN 975 MG: 325 TABLET, FILM COATED ORAL at 06:26

## 2022-02-19 RX ADMIN — Medication 1 APPLICATION: at 17:31

## 2022-02-19 RX ADMIN — ACETAMINOPHEN 975 MG: 325 TABLET, FILM COATED ORAL at 21:01

## 2022-02-19 RX ADMIN — INSULIN LISPRO 20 UNITS: 100 INJECTION, SOLUTION INTRAVENOUS; SUBCUTANEOUS at 21:01

## 2022-02-19 NOTE — ASSESSMENT & PLAN NOTE
Patient sustained a mechanical fall when she bent over to reach the railing, landing on her left shoulder  CT of the left upper extremity revealed an extensively comminuted fracture at the surgical neck of the left proximal humerus  · Appreciate orthopedic surgery recs   · PT/OT/PM  · Analgesics prn

## 2022-02-19 NOTE — ASSESSMENT & PLAN NOTE
Lab Results   Component Value Date    HGBA1C 10 3 01/21/2020       Recent Labs     02/18/22  1539 02/18/22  2258 02/19/22  0720 02/19/22  1120   POCGLU 304* 241* 89 134       Blood Sugar Average: Last 72 hrs:  (P) 272 2     Continue lantus  Accuchecks AC/HS with insulin sliding scale

## 2022-02-19 NOTE — PROGRESS NOTES
Progress Note - Orthopedics   Angeli Cornejo 73 y o  female MRN: [id number]  Unit/Bed#: [address] [place] [address] Encounter: [id number]    Assessment & Plan:  Left proximal humerus fracture  1  Xr left elbow with no findings  2  Maintain sling, NWB LUE  3  Pain control  4  PT/OT  5  Will require outpatient follow up next week with Dr Rani Stephens  6  Stable from ortho standpoint, please call with any issues    Subjective: Patient seen this morning  Reports pain in the shoulder, using the sling  No numbness or tingling to LUE  No fever or chills    Objective:    Vitals:    02/19/22 0759   BP: 99/53   Pulse: 80   Resp: 18   Temp: 98 1 °F (36 7 °C)   SpO2: 98%       Physical Exam:  LUE: in sling, mild ecchymosis to shoulder  No erythema  Tender about the shoulder  No ROM due to injury  Left elbow tender, pain with gentle ROM  Left upper extremity intact sensation to light touch throughout  +Finger/hand/wrsit ROM, strength 5/5  Fingers warm and perfused, brisk CR  Data Review:  I have personally reviewed pertinent lab results      Lab Results   Component Value Date    K 5 2 02/19/2022     02/19/2022    CO2 28 02/19/2022    BUN 69 (H) 02/19/2022    CREATININE 2 47 (H) 02/19/2022     Lab Results   Component Value Date    WBC 9 79 02/19/2022    HGB 7 9 (L) 02/19/2022     02/19/2022     No results found for: PT, INR, PTT

## 2022-02-19 NOTE — ASSESSMENT & PLAN NOTE
Lab Results   Component Value Date    EGFR 19 02/19/2022    EGFR 13 02/18/2022    EGFR 34 02/16/2022    CREATININE 2 47 (H) 02/19/2022    CREATININE 3 32 (H) 02/18/2022    CREATININE 1 52 (H) 02/16/2022     Baseline Cr 1 2-1 4   Hold demadex  Gentle hydration with IVF

## 2022-02-19 NOTE — PLAN OF CARE
Problem: MOBILITY - ADULT  Goal: Maintain or return to baseline ADL function  Description: INTERVENTIONS:  -  Assess patient's ability to carry out ADLs; assess patient's baseline for ADL function and identify physical deficits which impact ability to perform ADLs (bathing, care of mouth/teeth, toileting, grooming, dressing, etc )  - Assess/evaluate cause of self-care deficits   - Assess range of motion  - Assess patient's mobility; develop plan if impaired  - Assess patient's need for assistive devices and provide as appropriate  - Encourage maximum independence but intervene and supervise when necessary  - Involve family in performance of ADLs  - Assess for home care needs following discharge   - Consider OT consult to assist with ADL evaluation and planning for discharge  - Provide patient education as appropriate  Outcome: Progressing  Goal: Maintains/Returns to pre admission functional level  Description: INTERVENTIONS:  - Perform BMAT or MOVE assessment daily    - Set and communicate daily mobility goal to care team and patient/family/caregiver  - Collaborate with rehabilitation services on mobility goals if consulted  - Perform Range of Motion 2 times a day  - Reposition patient every 3 hours    - Dangle patient 2 times a day  - Stand patient 2  times a day  - Ambulate patient 2 times a day  - Out of bed to chair 3 times a day   - Out of bed for meals 2 times a day  - Out of bed for toileting  - Record patient progress and toleration of activity level   Outcome: Progressing

## 2022-02-19 NOTE — ASSESSMENT & PLAN NOTE
Lab Results   Component Value Date    HGBA1C 10 3 01/21/2020       Recent Labs     02/17/22  2125 02/18/22  0726 02/18/22  1138 02/18/22  1539   POCGLU 331* 117 165* 304*       Blood Sugar Average: Last 72 hrs:  (P) 322 [id number] lantus  Accuchecks AC/HS with insulin sliding scale

## 2022-02-19 NOTE — PROGRESS NOTES
[facility]   Progress Note Yanci Jacobsen 1950, 73 y o  female MRN: [id number]  Unit/Bed#: [address] [place] [address] Encounter: [id number]  Primary Care Provider: Olga Marie   Date and time admitted to hospital: 2/16/2022  9:23 PM    * Humeral head fracture  Assessment & Plan  Patient sustained a mechanical fall when she bent over to reach the railing, landing on her left shoulder  CT of the left upper extremity revealed an extensively comminuted fracture at the surgical neck of the left proximal humerus  · Appreciate orthopedic surgery recs   · PT/OT/PM  · Analgesics prn     CKD (chronic kidney disease) stage 3, GFR 30-59 ml/min [facility])  Assessment & Plan  Lab Results   Component Value Date    EGFR 13 02/18/2022    EGFR 34 02/16/2022    EGFR 45 01/01/2020    CREATININE 3 32 (H) 02/18/2022    CREATININE 1 52 (H) 02/16/2022    CREATININE 1 24 01/01/2020     Baseline Cr 1 2-1 4   Hold demadex    Essential hypertension  Assessment & Plan  Continue norvasc, lopressor    Mixed hyperlipidemia  Assessment & Plan  Continue statin     Paroxysmal atrial fibrillation (HCC)  Assessment & Plan  Continue metoprolol and eliquis     Diabetes mellitus type 2 in nonobese [facility])  Assessment & Plan  Lab Results   Component Value Date    HGBA1C 10 3 01/21/2020       Recent Labs     02/17/22  2125 02/18/22  0726 02/18/22  1138 02/18/22  1539   POCGLU 331* 117 165* 304*       Blood Sugar Average: Last 72 hrs:  (P) 322 [id number] lantus  Accuchecks AC/HS with insulin sliding scale     Asthma  Assessment & Plan  Continue albuterol, singulair         VTE Pharmacologic Prophylaxis: eliquis    Patient Centered Rounds: I performed bedside rounds with nursing staff today  Discussions with Specialists or Other Care Team Provider: Yes    Education and Discussions with Family / Patient: Updated  (niece) via phone  Time Spent for Care: 45 minutes   More than 50% of total time spent on counseling and coordination of care as described above  Current Length of Stay: 1 day(s)  Current Patient Status: Inpatient   Certification Statement: The patient will continue to require additional inpatient hospital stay due to humeral fracture  Discharge Plan: Anticipate discharge tomorrow to rehab facility  Code Status: Level 1 - Full Code    Subjective:   Patient with persistent pain     Objective:     Vitals:   Temp (24hrs), Av 3 °F (36 8 °C), Min:98 1 °F (36 7 °C), Max:98 4 °F (36 9 °C)    Temp:  [98 1 °F (36 7 °C)-98 4 °F (36 9 °C)] 98 1 °F (36 7 °C)  HR:  [76-94] 88  Resp:  [16-19] 16  BP: (119-124)/(57-58) 121/57  SpO2:  [95 %-99 %] 95 %  Body mass index is 49 02 kg/m²  Input and Output Summary (last 24 hours):   No intake or output data in the 24 hours ending 22    Physical Exam:   Physical Exam  HENT:      Head: Normocephalic and atraumatic  Mouth/Throat:      Mouth: Mucous membranes are moist    Eyes:      Extraocular Movements: Extraocular movements intact  Cardiovascular:      Rate and Rhythm: Normal rate  Pulmonary:      Effort: Pulmonary effort is normal    Abdominal:      General: Abdomen is flat  Palpations: Abdomen is soft  Tenderness: There is no abdominal tenderness  Musculoskeletal:      Comments: LUE in sling   Skin:     General: Skin is warm and dry  Neurological:      Mental Status: She is alert            Additional Data:     Labs:  Results from last 7 days   Lab Units 22  0504   WBC Thousand/uL 10 85*   HEMOGLOBIN g/dL 8 5*   HEMATOCRIT % 27 6*   PLATELETS Thousands/uL 168   NEUTROS PCT % 62   LYMPHS PCT % 25   MONOS PCT % 10   EOS PCT % 1     Results from last 7 days   Lab Units 22  0504 22  2335 22  2335   SODIUM mmol/L 136   < > 142   POTASSIUM mmol/L 5 7*   < > 4 8   CHLORIDE mmol/L 102   < > 103   CO2 mmol/L 25   < > 29   BUN mg/dL 65*   < > 39*   CREATININE mg/dL 3 32*   < > 1 52*   ANION GAP mmol/L 9   < > 10   CALCIUM mg/dL 7 5*   < > 8 1*   ALBUMIN g/dL  --   --  3 2*   TOTAL BILIRUBIN mg/dL  --   --  0 38   ALK PHOS U/L  --   --  74   ALT U/L  --   --  27   AST U/L  --   --  17   GLUCOSE RANDOM mg/dL 115   < > 305*    < > = values in this interval not displayed       Results from last 7 days   Lab Units 02/16/22  2335   INR  1 13     Results from last 7 days   Lab Units 02/18/22  1539 02/18/22  1138 02/18/22  0726 02/17/22  2125 02/17/22  1623 02/17/22  1058 02/17/22  0746   POC GLUCOSE mg/dl 304* 165* 117 331* 430* 469* 442*               Lines/Drains:  Invasive Devices  Report    Peripheral Intravenous Line            Peripheral IV 02/16/22 Right Antecubital 1 day                      Imaging: Reviewed radiology reports from this admission including: xray(s)    Recent Cultures (last 7 days):         Last 24 Hours Medication List:   Current Facility-Administered Medications   Medication Dose Route Frequency Provider Last Rate    acetaminophen  975 mg Oral Q8H [facility] Jared Verdin, DO      albuterol  2 puff Inhalation Q6H PRN MARLENE SoNP      amLODIPine  5 mg Oral Daily RK So      ammonium lactate   Topical BID Brendalyn MARLENE BrooksNP      apixaban  5 mg Oral BID SharnoalyRK Lopez      insulin glargine  40 Units Subcutaneous Q12H [facility] RK So      insulin lispro  4-20 Units Subcutaneous TID AC Jared Verdin, DO      insulin lispro  4-20 Units Subcutaneous HS Jared Verdin, DO      lidocaine  2 patch Topical Daily Jared Verdin, DO      loratadine  10 mg Oral Daily RK So      metoprolol tartrate  50 mg Oral Q12H [facility] RK So      montelukast  10 mg Oral Daily SharonalyRK Lopez      ondansetron  4 mg Intravenous Q6H PRN Jared Verdin, DO      pravastatin  80 mg Oral Daily [facility], CRNP      pregabalin  100 mg Oral BID RK So      sodium chloride  100 mL/hr Intravenous Continuous Jared Verdin  mL/hr (02/18/22 1970)    traMADol  50 mg Oral Q6H PRN Jared Melo DO          Today, Patient Was Seen By: Kat Hernandez,     **Please Note: This note may have been constructed using a voice recognition system  **

## 2022-02-19 NOTE — PLAN OF CARE
Problem: MOBILITY - ADULT  Goal: Maintain or return to baseline ADL function  Description: INTERVENTIONS:  -  Assess patient's ability to carry out ADLs; assess patient's baseline for ADL function and identify physical deficits which impact ability to perform ADLs (bathing, care of mouth/teeth, toileting, grooming, dressing, etc )  - Assess/evaluate cause of self-care deficits   - Assess range of motion  - Assess patient's mobility; develop plan if impaired  - Assess patient's need for assistive devices and provide as appropriate  - Encourage maximum independence but intervene and supervise when necessary  - Involve family in performance of ADLs  - Assess for home care needs following discharge   - Consider OT consult to assist with ADL evaluation and planning for discharge  - Provide patient education as appropriate  Outcome: Progressing  Goal: Maintains/Returns to pre admission functional level  Description: INTERVENTIONS:  - Perform BMAT or MOVE assessment daily    - Set and communicate daily mobility goal to care team and patient/family/caregiver  - Collaborate with rehabilitation services on mobility goals if consulted  - Perform Range of Motion 2 times a day  - Reposition patient every 2 hours    - Dangle patient 2 times a day    - Ambulate patient 2 times as tolerated times a day  - Out of bed to chair daily     - Out of bed for toileting  - Record patient progress and toleration of activity level   Outcome: Progressing     Problem: Potential for Falls  Goal: Patient will remain free of falls  Description: INTERVENTIONS:  - Educate patient/family on patient safety including physical limitations  - Instruct patient to call for assistance with activity   - Consult OT/PT to assist with strengthening/mobility   - Keep Call bell within reach  - Keep bed low and locked with side rails adjusted as appropriate  - Keep care items and personal belongings within reach  - Initiate and maintain comfort rounds  - Make Fall Risk Sign visible to staff  - Offer Toileting every 2 Hours, in advance of need- Initiate/Maintain bed/chair alarm    - Apply yellow socks and bracelet for high fall risk patients  - Consider moving patient to room near nurses station  Outcome: Progressing     Problem: MUSCULOSKELETAL - ADULT  Goal: Maintain or return mobility to safest level of function  Description: INTERVENTIONS:  - Assess patient's ability to carry out ADLs; assess patient's baseline for ADL function and identify physical deficits which impact ability to perform ADLs (bathing, care of mouth/teeth, toileting, grooming, dressing, etc )  - Assess/evaluate cause of self-care deficits   - Assess range of motion  - Assess patient's mobility  - Assess patient's need for assistive devices and provide as appropriate  - Encourage maximum independence but intervene and supervise when necessary  - Involve family in performance of ADLs  - Assess for home care needs following discharge   - Consider OT consult to assist with ADL evaluation and planning for discharge  - Provide patient education as appropriate  Outcome: Progressing  Goal: Maintain proper alignment of affected body part  Description: INTERVENTIONS:  - Support, maintain and protect limb and body alignment  - Provide patient/ family with appropriate education  Outcome: Progressing

## 2022-02-19 NOTE — ASSESSMENT & PLAN NOTE
Lab Results   Component Value Date    EGFR 13 02/18/2022    EGFR 34 02/16/2022    EGFR 45 01/01/2020    CREATININE 3 32 (H) 02/18/2022    CREATININE 1 52 (H) 02/16/2022    CREATININE 1 24 01/01/2020     Baseline Cr 1 2-1 4   Hold demadex

## 2022-02-19 NOTE — PROGRESS NOTES
[place] [facility]  Progress Note Isela Reyna 1950, 73 y o  female MRN: [id number]  Unit/Bed#: [address] [place] [address] Encounter: [id number]  Primary Care Provider: Diane Delgado   Date and time admitted to hospital: 2/16/2022  9:23 PM    Acute kidney injury superimposed on CKD [facility])  Assessment & Plan  Lab Results   Component Value Date    EGFR 19 02/19/2022    EGFR 13 02/18/2022    EGFR 34 02/16/2022    CREATININE 2 47 (H) 02/19/2022    CREATININE 3 32 (H) 02/18/2022    CREATININE 1 52 (H) 02/16/2022     Baseline Cr 1 2-1 4   Hold demadex  Gentle hydration with IVF     Essential hypertension  Assessment & Plan  Continue norvasc, lopressor    Mixed hyperlipidemia  Assessment & Plan  Continue statin     Paroxysmal atrial fibrillation ([address] )  Assessment & Plan  Continue metoprolol and eliquis     Diabetes mellitus type 2 in nonobese [facility])  Assessment & Plan  Lab Results   Component Value Date    HGBA1C 10 3 01/21/2020       Recent Labs     02/18/22  1539 02/18/22  2258 02/19/22  0720 02/19/22  1120   POCGLU 304* 241* 89 134       Blood Sugar Average: Last 72 hrs:  (P) 272 2     Continue lantus  Accuchecks AC/HS with insulin sliding scale     Asthma  Assessment & Plan  Continue albuterol, singulair       Morbid obesity due to excess calories, as evidenced by a BMI of 49 02, requiring dietary and lifestyle modifications  VTE Pharmacologic Prophylaxis: Eliquis    Patient Centered Rounds: I performed bedside rounds with nursing staff today  Discussions with Specialists or Other Care Team Provider: Yes    Education and Discussions with Family / Patient: Yes    Time Spent for Care: 45 minutes  More than 50% of total time spent on counseling and coordination of care as described above      Current Length of Stay: 2 day(s)  Current Patient Status: Inpatient   Certification Statement: The patient will continue to require additional inpatient hospital stay due to SEBASTIAN   Discharge Plan: Anticipate discharge tomorrow to rehab facility  Code Status: Level 1 - Full Code    Subjective:   Patient with improvement in pain     Objective:     Vitals:   Temp (24hrs), Av 1 °F (36 7 °C), Min:97 9 °F (36 6 °C), Max:98 2 °F (36 8 °C)    Temp:  [97 9 °F (36 6 °C)-98 2 °F (36 8 °C)] 98 1 °F (36 7 °C)  HR:  [80-96] 80  Resp:  [16-18] 18  BP: ()/(53-65) 99/53  SpO2:  [95 %-98 %] 98 %  Body mass index is 49 02 kg/m²  Input and Output Summary (last 24 hours): Intake/Output Summary (Last 24 hours) at 2022 1429  Last data filed at 2022 1101  Gross per 24 hour   Intake 1300 ml   Output 700 ml   Net 600 ml       Physical Exam:   Physical Exam  HENT:      Head: Normocephalic and atraumatic  Mouth/Throat:      Mouth: Mucous membranes are moist    Eyes:      Extraocular Movements: Extraocular movements intact  Cardiovascular:      Rate and Rhythm: Normal rate  Pulmonary:      Effort: Pulmonary effort is normal    Abdominal:      General: Abdomen is flat  Palpations: Abdomen is soft  Tenderness: There is no abdominal tenderness  Musculoskeletal:      Comments: LUE in sling    Skin:     General: Skin is warm and dry  Neurological:      Mental Status: She is alert             Additional Data:     Labs:  Results from last 7 days   Lab Units 22  0510   WBC Thousand/uL 9 79   HEMOGLOBIN g/dL 7 9*   HEMATOCRIT % 24 7*   PLATELETS Thousands/uL 167   NEUTROS PCT % 58   LYMPHS PCT % 28   MONOS PCT % 10   EOS PCT % 2     Results from last 7 days   Lab Units 22  0510 22  0504 22  2335   SODIUM mmol/L 139   < > 142   POTASSIUM mmol/L 5 2   < > 4 8   CHLORIDE mmol/L 104   < > 103   CO2 mmol/L 28   < > 29   BUN mg/dL 69*   < > 39*   CREATININE mg/dL 2 47*   < > 1 52*   ANION GAP mmol/L 7   < > 10   CALCIUM mg/dL 6 8*   < > 8 1*   ALBUMIN g/dL  --   --  3 2*   TOTAL BILIRUBIN mg/dL  --   --  0 38   ALK PHOS U/L  --   --  74   ALT U/L  --   --  27   AST U/L  --   --  17   GLUCOSE RANDOM mg/dL 119   < > 305*    < > = values in this interval not displayed       Results from last 7 days   Lab Units 02/16/22  2335   INR  1 13     Results from last 7 days   Lab Units 02/19/22  1120 02/19/22  0720 02/18/22  2258 02/18/22  1539 02/18/22  1138 02/18/22  0726 02/17/22  2125 02/17/22  1623 02/17/22  1058 02/17/22  0746   POC GLUCOSE mg/dl 134 89 241* 304* 165* 117 331* 430* 469* 442*               Lines/Drains:  Invasive Devices  Report    Peripheral Intravenous Line            Peripheral IV 02/19/22 Proximal;Right;Ventral (anterior) Forearm <1 day                      Imaging: Reviewed radiology reports from this admission including: CT upper extremity    Recent Cultures (last 7 days):         Last 24 Hours Medication List:   Current Facility-Administered Medications   Medication Dose Route Frequency Provider Last Rate    acetaminophen  975 mg Oral Q8H [address] Jared Verdin DO      albuterol  2 puff Inhalation Q6H PRN Pollie Peppers, CRNP      amLODIPine  5 mg Oral Daily Pollie Peppers, CRNP      ammonium lactate   Topical BID Pollie Peppers, CRNP      apixaban  5 mg Oral BID Pollie Peppers, CRNP      insulin glargine  40 Units Subcutaneous Q12H [address] Pollie Peppers, CRNP      insulin lispro  4-20 Units Subcutaneous TID AC Jared Verdin DO      insulin lispro  4-20 Units Subcutaneous HS Jared Verdin DO      lidocaine  2 patch Topical Daily Jared Verdin DO      loratadine  10 mg Oral Daily Pollie Peppers, CRNP      metoprolol tartrate  50 mg Oral Q12H [address] Pollie Peppers, CRNP      montelukast  10 mg Oral Daily Pollie Peppers, CRNP      ondansetron  4 mg Intravenous Q6H PRN Jared Verdin DO      pravastatin  80 mg Oral Daily With [employer], CRNP      pregabalin  100 mg Oral BID Pollie Peppers, CRNP      sodium chloride  100 mL/hr Intravenous Continuous Jared Verdin  mL/hr (02/19/22 1019)    traMADol  50 mg Oral Q6H PRN Jared Retana DO          Today, Patient Was Seen By: Bubba Lozoya DO    **Please Note: This note may have been constructed using a voice recognition system  **

## 2022-02-20 VITALS
BODY MASS INDEX: 49.32 KG/M2 | DIASTOLIC BLOOD PRESSURE: 57 MMHG | SYSTOLIC BLOOD PRESSURE: 122 MMHG | TEMPERATURE: 98.3 F | RESPIRATION RATE: 18 BRPM | HEIGHT: 62 IN | HEART RATE: 81 BPM | OXYGEN SATURATION: 95 % | WEIGHT: 268 LBS

## 2022-02-20 LAB
ANION GAP SERPL CALCULATED.3IONS-SCNC: 7 MMOL/L (ref 4–13)
BUN SERPL-MCNC: 54 MG/DL (ref 5–25)
CALCIUM SERPL-MCNC: 7 MG/DL (ref 8.3–10.1)
CHLORIDE SERPL-SCNC: 108 MMOL/L (ref 100–108)
CO2 SERPL-SCNC: 26 MMOL/L (ref 21–32)
CREAT SERPL-MCNC: 1.68 MG/DL (ref 0.6–1.3)
GFR SERPL CREATININE-BSD FRML MDRD: 30 ML/MIN/1.73SQ M
GLUCOSE SERPL-MCNC: 110 MG/DL (ref 65–140)
GLUCOSE SERPL-MCNC: 114 MG/DL (ref 65–140)
GLUCOSE SERPL-MCNC: 181 MG/DL (ref 65–140)
GLUCOSE SERPL-MCNC: 270 MG/DL (ref 65–140)
POTASSIUM SERPL-SCNC: 4.6 MMOL/L (ref 3.5–5.3)
SODIUM SERPL-SCNC: 141 MMOL/L (ref 136–145)

## 2022-02-20 PROCEDURE — 99239 HOSP IP/OBS DSCHRG MGMT >30: CPT | Performed by: STUDENT IN AN ORGANIZED HEALTH CARE EDUCATION/TRAINING PROGRAM

## 2022-02-20 PROCEDURE — 80048 BASIC METABOLIC PNL TOTAL CA: CPT | Performed by: STUDENT IN AN ORGANIZED HEALTH CARE EDUCATION/TRAINING PROGRAM

## 2022-02-20 PROCEDURE — 82948 REAGENT STRIP/BLOOD GLUCOSE: CPT

## 2022-02-20 RX ORDER — ONDANSETRON 4 MG/1
4 TABLET, ORALLY DISINTEGRATING ORAL EVERY 6 HOURS PRN
Qty: 20 TABLET | Refills: 0
Start: 2022-02-20 | End: 2022-03-25

## 2022-02-20 RX ORDER — ONDANSETRON 2 MG/ML
4 INJECTION INTRAMUSCULAR; INTRAVENOUS EVERY 6 HOURS PRN
Refills: 0
Start: 2022-02-20 | End: 2022-02-20 | Stop reason: HOSPADM

## 2022-02-20 RX ORDER — TRAMADOL HYDROCHLORIDE 50 MG/1
50 TABLET ORAL EVERY 6 HOURS PRN
Refills: 0 | Status: SHIPPED | OUTPATIENT
Start: 2022-02-20 | End: 2022-03-02

## 2022-02-20 RX ORDER — DOCUSATE SODIUM 100 MG/1
100 CAPSULE, LIQUID FILLED ORAL 2 TIMES DAILY
Status: DISCONTINUED | OUTPATIENT
Start: 2022-02-20 | End: 2022-02-20 | Stop reason: HOSPADM

## 2022-02-20 RX ORDER — LIDOCAINE 50 MG/G
2 PATCH TOPICAL DAILY
Refills: 0
Start: 2022-02-21 | End: 2022-05-26

## 2022-02-20 RX ADMIN — Medication: at 09:27

## 2022-02-20 RX ADMIN — TRAMADOL HYDROCHLORIDE 50 MG: 50 TABLET, COATED ORAL at 09:25

## 2022-02-20 RX ADMIN — ONDANSETRON 4 MG: 2 INJECTION INTRAMUSCULAR; INTRAVENOUS at 15:20

## 2022-02-20 RX ADMIN — ONDANSETRON 4 MG: 2 INJECTION INTRAMUSCULAR; INTRAVENOUS at 09:00

## 2022-02-20 RX ADMIN — INSULIN LISPRO 4 UNITS: 100 INJECTION, SOLUTION INTRAVENOUS; SUBCUTANEOUS at 11:27

## 2022-02-20 RX ADMIN — LORATADINE 10 MG: 10 TABLET ORAL at 09:25

## 2022-02-20 RX ADMIN — APIXABAN 5 MG: 5 TABLET, FILM COATED ORAL at 09:24

## 2022-02-20 RX ADMIN — ACETAMINOPHEN 975 MG: 325 TABLET, FILM COATED ORAL at 13:38

## 2022-02-20 RX ADMIN — ACETAMINOPHEN 975 MG: 325 TABLET, FILM COATED ORAL at 05:25

## 2022-02-20 RX ADMIN — INSULIN GLARGINE 40 UNITS: 100 INJECTION, SOLUTION SUBCUTANEOUS at 09:25

## 2022-02-20 RX ADMIN — TRAMADOL HYDROCHLORIDE 50 MG: 50 TABLET, COATED ORAL at 15:25

## 2022-02-20 RX ADMIN — DOCUSATE SODIUM 100 MG: 100 CAPSULE ORAL at 09:24

## 2022-02-20 RX ADMIN — AMLODIPINE BESYLATE 5 MG: 5 TABLET ORAL at 09:25

## 2022-02-20 RX ADMIN — MONTELUKAST SODIUM 10 MG: 10 TABLET, FILM COATED ORAL at 09:25

## 2022-02-20 RX ADMIN — LIDOCAINE 5% 2 PATCH: 700 PATCH TOPICAL at 09:25

## 2022-02-20 RX ADMIN — SODIUM CHLORIDE 100 ML/HR: 0.9 INJECTION, SOLUTION INTRAVENOUS at 05:26

## 2022-02-20 RX ADMIN — METOPROLOL TARTRATE 50 MG: 50 TABLET, FILM COATED ORAL at 09:25

## 2022-02-20 RX ADMIN — PREGABALIN 100 MG: 100 CAPSULE ORAL at 09:24

## 2022-02-20 NOTE — PLAN OF CARE
Problem: MOBILITY - ADULT  Goal: Maintain or return to baseline ADL function  Description: INTERVENTIONS:  -  Assess patient's ability to carry out ADLs; assess patient's baseline for ADL function and identify physical deficits which impact ability to perform ADLs (bathing, care of mouth/teeth, toileting, grooming, dressing, etc )  - Assess/evaluate cause of self-care deficits   - Assess range of motion  - Assess patient's mobility; develop plan if impaired  - Assess patient's need for assistive devices and provide as appropriate  - Encourage maximum independence but intervene and supervise when necessary  - Involve family in performance of ADLs  - Assess for home care needs following discharge   - Consider OT consult to assist with ADL evaluation and planning for discharge  - Provide patient education as appropriate  Outcome: Progressing  Goal: Maintains/Returns to pre admission functional level  Description: INTERVENTIONS:  - Perform BMAT or MOVE assessment daily    - Set and communicate daily mobility goal to care team and patient/family/caregiver  - Collaborate with rehabilitation services on mobility goals if consulted  - Perform Range of Motion 4 times a day  - Reposition patient every 2 hours    - Stand patient  4 times a day  - Ambulate patient 2 times a day  - Out of bed to chair 2  times a day   - Out of bed for toileting  - Record patient progress and toleration of activity level   Outcome: Progressing     Problem: Potential for Falls  Goal: Patient will remain free of falls  Description: INTERVENTIONS:  - Educate patient/family on patient safety including physical limitations  - Instruct patient to call for assistance with activity   - Consult OT/PT to assist with strengthening/mobility   - Keep Call bell within reach  - Keep bed low and locked with side rails adjusted as appropriate  - Keep care items and personal belongings within reach  - Initiate and maintain comfort rounds  - Make Fall Risk Sign visible to staff  - Offer Toileting every 1 Hours, in advance of need  - Initiate/Maintain bed alarm  - Apply yellow socks and bracelet for high fall risk patients  - Consider moving patient to room near nurses station  Outcome: Progressing     Problem: MUSCULOSKELETAL - ADULT  Goal: Maintain or return mobility to safest level of function  Description: INTERVENTIONS:  - Assess patient's ability to carry out ADLs; assess patient's baseline for ADL function and identify physical deficits which impact ability to perform ADLs (bathing, care of mouth/teeth, toileting, grooming, dressing, etc )  - Assess/evaluate cause of self-care deficits   - Assess range of motion  - Assess patient's mobility  - Assess patient's need for assistive devices and provide as appropriate  - Encourage maximum independence but intervene and supervise when necessary  - Involve family in performance of ADLs  - Assess for home care needs following discharge   - Consider OT consult to assist with ADL evaluation and planning for discharge  - Provide patient education as appropriate  Outcome: Progressing  Goal: Maintain proper alignment of affected body part  Description: INTERVENTIONS:  - Support, maintain and protect limb and body alignment  - Provide patient/ family with appropriate education  Outcome: Progressing     Problem: Prexisting or High Potential for Compromised Skin Integrity  Goal: Skin integrity is maintained or improved  Description: INTERVENTIONS:  - Identify patients at risk for skin breakdown  - Assess and monitor skin integrity  - Assess and monitor nutrition and hydration status  - Monitor labs   - Assess for incontinence   - Turn and reposition patient  - Assist with mobility/ambulation  - Relieve pressure over bony prominences  - Avoid friction and shearing  - Provide appropriate hygiene as needed including keeping skin clean and dry  - Evaluate need for skin moisturizer/barrier cream  - Collaborate with interdisciplinary team - Patient/family teaching  - Consider wound care consult   Outcome: Progressing

## 2022-02-20 NOTE — DISCHARGE SUMMARY
[address]  Discharge- Faheem Lemus 1950, 73 y o  female MRN: [id number]  Unit/Bed#: [address] [place] [address] Encounter: [id number]  Primary Care Provider: Lisbet Corley   Date and time admitted to hospital: 2/16/2022  9:23 PM    No new Assessment & Plan notes have been filed under this hospital service since the last note was generated  Service: Hospitalist      Medical Problems             Resolved Problems  Date Reviewed: 2/17/2022    None              Discharging Physician / Practitioner: Massiel Grover DO  PCP: Lisbet Corley  Admission Date:   Admission Orders (From admission, onward)     Ordered        02/17/22 1448  Inpatient Admission  Once            02/17/22 0205  Place in Observation  Once                      Discharge Date: 02/20/22    Consultations During Hospital Stay:  · Orthopedic surgery     Procedures Performed:   · None    Significant Findings / Test Results:   · CT upper extremity: Extensively comminuted fracture at the surgical neck of left proximal humerus as detailed above  Glenohumeral joint appears intact  Complications:  None    Reason for Admission: Humerus fracture     Hospital Course:   Faheem Lemus is a 73 y o  female patient who originally presented to the hospital on 2/16/2022 status post mechanical fall  Imaging significant for fracture proximal left humerus  Patient was evaluated by Orthopedic surgery, patient to be evaluated next week for surgery  Patient developed acute kidney injury on CKD  Patient was treated with IV fluids and creatinine trended down  Patient was made optimized for discharge to rehab and outpatient orthopedic surgery follow-up  Please see above list of diagnoses and related plan for additional information  Condition at Discharge: fair    Discharge Day Visit / Exam:   Subjective:  No acute events overnight    Vitals: Blood Pressure: 122/57 (02/20/22 0700)  Pulse: 81 (02/20/22 0700)  Temperature: 98 3 °F (36 8 °C) (02/20/22 0700)  Temp Source: Oral (02/20/22 0700)  Respirations: 18 (02/20/22 0700)  Height: 5' 2" (157 5 cm) (02/17/22 0419)  Weight - Scale: 122 kg (268 lb) (02/17/22 0419)  SpO2: 95 % (02/20/22 0700)    Exam:   Physical Exam  HENT:      Head: Normocephalic and atraumatic  Mouth/Throat:      Mouth: Mucous membranes are moist    Eyes:      Extraocular Movements: Extraocular movements intact  Cardiovascular:      Rate and Rhythm: Normal rate  Pulmonary:      Effort: Pulmonary effort is normal    Abdominal:      General: Abdomen is flat  Palpations: Abdomen is soft  Tenderness: There is no abdominal tenderness  Musculoskeletal:      Comments: LUE in sling    Skin:     General: Skin is warm and dry  Neurological:      Mental Status: She is alert  Mental status is at baseline  Discharge instructions/Information to patient and family:   See after visit summary for information provided to patient and family  Provisions for Follow-Up Care:  See after visit summary for information related to follow-up care and any pertinent home health orders  Disposition:   [facility] Readmission: Yes     Discharge Statement:  I spent greater than 30 minutes discharging the patient  This time was spent on the day of discharge  I had direct contact with the patient on the day of discharge  Greater than 50% of the total time was spent examining patient, answering all patient questions, arranging and discussing plan of care with patient as well as directly providing post-discharge instructions  Additional time then spent on discharge activities  Discharge Medications:  See after visit summary for reconciled discharge medications provided to patient and/or family        **Please Note: This note may have been constructed using a voice recognition system**

## 2022-02-20 NOTE — CASE MANAGEMENT
Case Management Discharge Planning Note    Patient name Michael Galvan  [address] [facility]-* MRN [id number]  : 1950 Date 2022       Current Admission Date: 2022  Current Admission Diagnosis:Humeral head fracture   Patient Active Problem List    Diagnosis Date Noted    Humeral head fracture 2022    Acute kidney failure, unspecified ([place] [address] ) 2019    Acute kidney injury superimposed on CKD ([place] [address] ) 2019    Essential hypertension 2019    Anemia 2019    Mixed hyperlipidemia 2019    Paroxysmal atrial fibrillation ([place] [address] ) 2019    Lower leg edema 2019    Asthma 2018    Diabetes mellitus type 2 in nonobese ([place] [address] ) 2018    Morbid obesity with BMI of 45 0-49 9, adult ([place] [address] ) 2018      LOS (days): 3  Geometric Mean LOS (GMLOS) (days): 3 00  Days to GMLOS:0 1     OBJECTIVE:  Risk of Unplanned Readmission Score: 16     Current admission status: Inpatient   Preferred Pharmacy:   [facility] [address] [place] [address]  Phone: [phone] Fax: [phone]    Primary Care Provider: Ksaia Crawley    Primary Insurance: MEDICARE  Secondary Insurance: CIG[employer]    DISCHARGE DETAILS:  Comments - Freedom of Choice: Discharge planned today per Dr Denice Sue  [facility] placement at [facility] for skilled rehab is planned    CM contacted family/caregiver?: Yes (message left for [facility] requesting call back for discharge details)    Contacts  Patient Contacts: [facility]  Contact Method: Phone (left message)  Phone Number: [phone]     Treatment Team Recommendation: Short Term Rehab  Discharge Destination Plan[de-identified] Short Term Rehab  Transport at Discharge : Wheelchair Janine Fitzgerald  Dispatcher Contacted: Yes  Number/Name of Dispatcher: SLETS  Transported by [employer] and Unit #):  (pending)  ETA of Transport (Date): 22  ETA of Transport (Time): 1500 (requested)      Accepting Facility Name, [address] : [facility] [place] [place]  Receiving Facility/Agency Phone Number: [phone]

## 2022-02-20 NOTE — [place] UNIVERSAL TRANSFER FORM
[place] UNIVERSAL TRANSFER FORM  (ALL ITEMS MUST BE COMPLETED)    1  TRANSFER FROM: Eren Lindquist TO: [facility]    2  DATE OF TRANSFER: 2/20/2022                        TIME OF TRANSFER: 1500    3  PATIENT NAME: Min Allen,        YOB: 1950                             GENDER: female    4  LANGUAGE:   English    5  PHYSICIAN NAME:  Ngozi Foster DO                   PHONE: [phone]  CODE STATUS: Level 1 - Full Code        Out of Hospital DNR Attached: n/a    7  :                                      :  Extended Emergency Contact Information  Primary Emergency Contact: Laurel Orozco   [facility]  Mobile Phone: [phone]  Relation: Relative           Health Care Representative/Proxy:  No           Legal Guardian: No      8  REASON FOR TRANSFER: s/p fall 2/16 with Left proximal humoral fracture; L hand dominant with sling; strengthening & conditioning until possible surgery with Dr Dena Monroy outpatient            V/S: /57 (BP Location: Right arm)   Pulse 81   Temp 98 3 °F (36 8 °C) (Oral)   Resp 18   Ht 5' 2" (1 575 m)   Wt 122 kg (268 lb)   SpO2 95%   BMI 49 02 kg/m²           PAIN: 3/10; tylenol 975 q 8; tramadol q 6 PRN given at 0925 for L shoulder pain    9  PRIMARY DIAGNOSIS: Humeral head fracture      Secondary Diagnosis: SEBASTIAN        Pacemaker: No Internal Defib: No        10  RESTRAINTS: n/a    11  RESPIRATORY NEEDS: n/a Rm air    12  ISOLATION/PRECAUTION: n/a    13  ALLERGY: Asa [aspirin], Indocin [indomethacin], Penicillins, and Percocet [oxycodone-acetaminophen]    14  SENSORY: Limited b/l LE; continent of bladder & bowels    15  SKIN CONDITION: Redness to sacrum- zinc oxide applied; redness & ben b/l LE ammonia lotion applied    16  DIET: Diabetic    17  IV ACCESS: n/a    18   PERSONAL ITEMS SENT WITH PATIENT: Flowers, cards, candy in pink tote; toiletries; purple hair brush; tan sneakers; bra; blouse; pants; socks; arm sling; phone &     19  ATTACHED DOCUMENTS: MUST ATTACH CURRENT MEDICATION INFORMATION; MAR; Labs; Diagnostics     20  AT RISK ALERTS: Falls          21  WEIGHT BEARING STATUS:         Left Leg: Limited        Right Leg: Limited        Left Upper Arm: NWB      22  MENTAL STATUS: AOX4    23  FUNCTION:        Walk: With help & quad cane        Transfer: With help        Toilet: With help to commode        Feed: With help; needs cutting    24  IMMUNIZATIONS/SCREENING:   COVID 1st dose completed; due for 2nd dose March 11 2022     25  BOWEL: Continent; last bm 2/17 on colace    26  BLADDER: Continent; to commode    27   SENDING FACILITY CONTACT:                   Title: Coby Ponce RN        Unit: 3N        Phone: [phone] [address] (if known):        Title: iLnnette Galvan, [address] [facility]

## 2022-02-20 NOTE — NURSING NOTE
Pt discharged in wheelchair with transport  Paperwork handed over to transport  All patient belongings sent with patient; pink tote with cards flowers candy; plaid blanket; cellphone & ; shirt pants shoes socks  IV removed with catheter clean dry and intact  Questions answered  Report given to Bill Tierney at [place]  No further complaints at this time

## 2022-02-20 NOTE — CASE MANAGEMENT
Case Management Discharge Planning Note    Patient name Fabien Hutchins  Location [address] [facility]-* MRN [id number]  : 1950 Date 2022       Current Admission Date: 2022  Current Admission Diagnosis:Humeral head fracture   Patient Active Problem List    Diagnosis Date Noted    Humeral head fracture 2022    Acute kidney failure, unspecified ([place] [address] ) 2019    Acute kidney injury superimposed on CKD ([place] [address] ) 2019    Essential hypertension 2019    Anemia 2019    Mixed hyperlipidemia 2019    Paroxysmal atrial fibrillation ([place] [address] ) 2019    Lower leg edema 2019    Asthma 2018    Diabetes mellitus type 2 in nonobese ([place] [address] ) 2018    Morbid obesity with BMI of 45 0-49 9, adult ([place] [address] ) 2018      LOS (days): 3  Geometric Mean LOS (GMLOS) (days): 3 00  Days to GMLOS:0     OBJECTIVE:  Risk of Unplanned Readmission Score: 16         Current admission status: Inpatient   Preferred Pharmacy:   [facility] [address] [place] [address]  Phone: [phone] Fax: [phone]    Primary Care Provider: Farida Mulligan    Primary Insurance: MEDICARE  Secondary Insurance: CIGNA    DISCHARGE DETAILS:    Discharge Destination Plan[de-identified] Short Term Rehab  Transport at Discharge : Wheelchair van  Dispatcher Contacted: Yes  Number/Name of Dispatcher: SLETS  Transported by [employer] and Unit #): Les  ETA of Transport (Date): 22  ETA of Transport (Time): [address] [facility] [address] : [facility]  Receiving Facility/Agency Phone Number: [phone]

## 2022-02-21 ENCOUNTER — TELEPHONE (OUTPATIENT)
Dept: OBGYN CLINIC | Facility: CLINIC | Age: 72
End: 2022-02-21

## 2022-02-21 NOTE — TELEPHONE ENCOUNTER
[facility] patient needs to be scheduled for Displaced Proximal Shoulder fracture for Dr Thornton Brought this week  I advised to call back to schedule

## 2022-02-21 NOTE — TELEPHONE ENCOUNTER
Critical access hospital requesting update on appt   [phone] [facility] 5598  Ask for philip

## 2022-02-23 ENCOUNTER — OFFICE VISIT (OUTPATIENT)
Dept: OBGYN CLINIC | Facility: CLINIC | Age: 72
End: 2022-02-23
Payer: MEDICARE

## 2022-02-23 ENCOUNTER — APPOINTMENT (OUTPATIENT)
Dept: RADIOLOGY | Facility: CLINIC | Age: 72
End: 2022-02-23
Payer: COMMERCIAL

## 2022-02-23 VITALS — HEART RATE: 77 BPM | DIASTOLIC BLOOD PRESSURE: 69 MMHG | SYSTOLIC BLOOD PRESSURE: 121 MMHG

## 2022-02-23 DIAGNOSIS — Z01.812 ENCOUNTER FOR PRE-OPERATIVE LABORATORY TESTING: ICD-10-CM

## 2022-02-23 DIAGNOSIS — S42.292A CLOSED FRACTURE OF HEAD OF LEFT HUMERUS, INITIAL ENCOUNTER: Primary | ICD-10-CM

## 2022-02-23 DIAGNOSIS — S42.292A CLOSED FRACTURE OF HEAD OF LEFT HUMERUS, INITIAL ENCOUNTER: ICD-10-CM

## 2022-02-23 PROCEDURE — 73030 X-RAY EXAM OF SHOULDER: CPT

## 2022-02-23 PROCEDURE — 99215 OFFICE O/P EST HI 40 MIN: CPT | Performed by: ORTHOPAEDIC SURGERY

## 2022-02-23 NOTE — H&P (VIEW-ONLY)
Assessment/Plan:  1  Closed fracture of head of left humerus, initial encounter  Ambulatory referral to Orthopedic Surgery    XR shoulder 2+ vw left    Case request operating room: ARTHROPLASTY SHOULDER REVERSE    Ambulatory referral to Cardiology    Type and screen    CBC and differential    APTT    Protime-INR    Basic metabolic panel    MRSA culture    EKG 12 lead    Case request operating room: ARTHROPLASTY SHOULDER REVERSE    Comfort Arm Sling    PAT Covid Screening   2  Encounter for pre-operative laboratory testing  Ambulatory referral to Cardiology    Type and screen    CBC and differential    APTT    Protime-INR    Basic metabolic panel    MRSA culture    EKG 12 lead    PAT Covid Screening       Scribe Attestation    I,:  Guy Rios am acting as a scribe while in the presence of the attending physician :       I,:  Nilda Alvarez MD personally performed the services described in this documentation    as scribed in my presence :           Jamie Montana upon examination and review the x-rays obtained today 2/23/2022 as well as the CT scan of the left shoulder obtained on 2/17/2022 does demonstrate an extensively comminuted displaced fracture of the proximal humerus  I did explain to her and her niece today that due to the extensive nature of the fracture, she does require surgical intervention the form of a reverse total shoulder arthroplasty  She understands that this is a major surgery  I did discuss the procedure with her at length as well as the risks including but not limited to bleeding, infection, medical problems perioperatively, death, nerve injury resulting weakness, numbness, pain, dislocation, prosthesis rejection, stiffness, recurrent injury, blood clot, failure surgery, fracture while implanting the prosthesis, and need for further surgery  I did also remark that I will likely incorporate her existing scar into the incision  As the existing scar is the incision location    She did verbalize understanding and verbalized consent  Her niece was present to sign the consent for a left reverse total shoulder arthroplasty as Casandra Carpenter stated that she was unable to sign due to her injury  She did state that she understood the risks and benefits of this procedure and agreed to have her niece sign consent for her  She was instructed that she is not to take any of her Eliquis for at least 48 hours prior to surgery  This was also indicated on her rehab center paperwork she does have a follow-up visit with Dr Vahe Tong tomorrow 2/24/2022  She will be provided with a postoperative sling  She will meet with my surgical scheduler to set up a date and time with expectation of having this completed on 3/1/2022  All questions were answered and patient and niece verbalized understanding  Subjective:   Niko Valentine is a 73 y o  female who presents to the office today for initial evaluation of her left shoulder  She unfortunately suffered a mechanical fall on 2/16/2022 when she missed grabbing onto her stair railing resulting in her falling backwards onto her left side from the stairs  She states that she believes that she struck her elbow 1st but has severe pain into the shoulder  She describes her pain as moderate to severe and does have difficulty managing her pain with over-the-counter oral analgesics  She does have some complaints of elbow pain however is much less severe when compared to her shoulder  She denies any distal paresthesias extending into her left upper extremity  She was evaluated at the emergency department on 2/16/2022 where she did have multiple images completed of the shoulder as well as the elbow  The x-rays and CT scan of the left shoulder does demonstrate a comminuted displaced fracture of the proximal humerus  She does have a history of a rotator cuff repair performed many years ago  She states she did successfully recovered from that surgery   She was consult by   Yanira Hendrickson the surgical a reverse total shoulder arthroplasty  She states that she is currently on Eliquis for history a that  She remarks that she only had 1 episode of Afib demonstrated EKG  Review of Systems   Constitutional: Negative for chills, fever and unexpected weight change  HENT: Negative for hearing loss, nosebleeds and sore throat  Eyes: Negative for pain, redness and visual disturbance  Respiratory: Negative for cough, shortness of breath and wheezing  Cardiovascular: Negative for chest pain, palpitations and leg swelling  Gastrointestinal: Negative for abdominal pain, nausea and vomiting  Endocrine: Negative for polydipsia and polyuria  Genitourinary: Negative for dysuria and hematuria  Musculoskeletal: Positive for arthralgias and myalgias  See HPI   Skin: Negative for rash and wound  Neurological: Negative for dizziness, numbness and headaches  Psychiatric/Behavioral: Negative for decreased concentration and suicidal ideas  The patient is not nervous/anxious            Past Medical History:   Diagnosis Date    [facility]     Asthma     CKD (chronic kidney disease)     Diabetes mellitus (HCC)     GERD (gastroesophageal reflux disease)     Hyperlipidemia     Hypertension     Kidney stones     PONV (postoperative nausea and vomiting)     [facility])        Past Surgical History:   Procedure Laterality Date    APPENDECTOMY      CYSTOSCOPY W/ LASER LITHOTRIPSY Left 7/12/2016    Procedure: CYSTOSCOPY URETEROSCOPY WITH LITHOTRIPSY HOLMIUM LASER, RETROGRADE PYELOGRAM AND INSERTION STENT URETERAL;  Surgeon: Rose Ray MD;  Location: [address] [place] [address];  Service:     DILATION AND CURETTAGE OF UTERUS      JOINT REPLACEMENT      right knee    KNEE ARTHROPLASTY Right     [place] CYSTOURETHROSCOPY,URETER CATHETER Left 6/29/2016    Procedure: CYSTOSCOPY RETROGRADE PYELOGRAM WITH INSERTION STENT URETERAL, left;  Surgeon: Jer Chong Tushar Santizo MD;  Location: [address] [place] [address];  Service: Urology    SHOULDER ARTHROTOMY Left        Family History   Problem Relation Age of Onset    Heart disease Mother     Emphysema Maternal Grandmother     Heart disease Family        Social History     Occupational History    Not on file   Tobacco Use    Smoking status: Former Smoker     Packs/day:  00     Years: 20 00     Pack years: 20 00     Types: Cigarettes     Quit date: 1996     Years since quittin 6    Smokeless tobacco: Never Used   Substance and Sexual Activity    Alcohol use: Not Currently     Comment: rarely    Drug use: No    Sexual activity: Not on file         Current Outpatient Medications:     albuterol (PROVENTIL HFA,VENTOLIN HFA) 90 mcg/act inhaler, Inhale 2 puffs every 6 (six) hours as needed for wheezing, Disp: , Rfl:     amLODIPine (NORVASC) 5 mg tablet, Take 1 tablet (5 mg total) by mouth daily, Disp: 30 tablet, Rfl: 0    ammonium lactate (LAC-HYDRIN) 12 % lotion, APPLY TOPICALLY TO AFFECTED AREAS twice a day, Disp: , Rfl:     apixaban (ELIQUIS) 5 mg, Take 1 tablet (5 mg total) by mouth 2 (two) times a day, Disp: 60 tablet, Rfl: 0    desloratadine (CLARINEX) 5 MG tablet, Take by mouth, Disp: , Rfl:     Insulin Glargine (TOUJEO SOLOSTAR) injection pen 300 units/mL, Inject 40 Units under the skin 2 (two) times a day  , Disp: , Rfl:     insulin lispro (HUMALOG) 100 units/mL injection, Inject under the skin 3 (three) times a day before meals Inject 5 units subq every 8 hours before meals if fasting BS>125, Disp: , Rfl:     lidocaine (LIDODERM) 5 %, Apply 2 patches topically daily Remove & Discard patch within 12 hours or as directed by MD, Disp: , Rfl: 0    metoprolol tartrate (LOPRESSOR) 25 mg tablet, Take 2 tablets (50 mg total) by mouth every 12 (twelve) hours, Disp: 180 tablet, Rfl: 3    montelukast (SINGULAIR) 10 mg tablet, Take 10 mg by mouth daily  , Disp: , Rfl:     NOVOFINE AUTOCOVER 30G X 8 MM MISC, , Disp: , Rfl: 0    ondansetron (Zofran ODT) 4 mg disintegrating tablet, Take 1 tablet (4 mg total) by mouth every 6 (six) hours as needed for nausea or vomiting, Disp: 20 tablet, Rfl: 0    pregabalin (LYRICA) 100 mg capsule, Take 100 mg by mouth 2 (two) times a day , Disp: , Rfl:     rosuvastatin (CRESTOR) 10 MG tablet, , Disp: , Rfl: 0    traMADol (ULTRAM) 50 mg tablet, Take 1 tablet (50 mg total) by mouth every 6 (six) hours as needed for moderate pain for up to 10 days, Disp: , Rfl: 0    Trulicity 1 5 AF/4 0XY SOPN, inject 0 5 milliliter subcutaneously every week 28, Disp: , Rfl:     Allergies   Allergen Reactions    Asa [Aspirin] GI Intolerance    Indocin [Indomethacin] Other (See Comments)     Made patient "loopy"    Moxifloxacin Other (See Comments)    Other Other (See Comments)    Penicillins Hives    Percocet [Oxycodone-Acetaminophen]     Zinc Acetate Other (See Comments)       Objective:  Vitals:    02/23/22 1034   BP: 121/69   Pulse: 77       Left Shoulder Exam     Tenderness   Left shoulder tenderness location: anterior     Muscle Strength   Left shoulder normal muscle strength: EPL: 5/5, FPL: 5/5, 1st dorsal interossesus: 5/5, APB: 5/5  Other   Erythema: absent  Scars: present  Sensation: normal  Pulse: present     Comments:  ROM and strength at shoulder omitted due to fracture  Sensation light touch is intact about the left upper extremity in the axillary nerve distribution as well as throughout C5 through T1 with a 2+ radial pulse  There is previous surgical scar from previous rotator cuff repair surgery  This is a scar from an open procedure  It is curvilinear in nature and will be incorporated with her surgical incision for the reverse total shoulder arthroplasty  Physical Exam  Vitals reviewed  HENT:      Head: Normocephalic and atraumatic  Eyes:      General:         Right eye: No discharge  Left eye: No discharge        Conjunctiva/sclera: Conjunctivae normal  Pupils: Pupils are equal, round, and reactive to light  Cardiovascular:      Rate and Rhythm: Normal rate  Heart sounds: Normal heart sounds  Pulmonary:      Effort: Pulmonary effort is normal  No respiratory distress  Breath sounds: Normal breath sounds  Musculoskeletal:      Cervical back: Normal range of motion and neck supple  Comments: As noted in HPI   Skin:     General: Skin is warm and dry  Neurological:      Mental Status: She is alert and oriented to person, place, and time  I have personally reviewed pertinent films in PACS and my interpretation is as follows:    X-rays of the left shoulder obtained today 2/23/2022 demonstrates a comminuted displaced proximal humerus fracture with a located humeral head  CT scan of the left upper extremity obtained on 2/17/2022 demonstrates a extensively comminuted fracture of the surgical neck of the left proximal humerus and humeral head  Glenohumeral joint does appear to be located

## 2022-02-23 NOTE — PROGRESS NOTES
Assessment/Plan:  1  Closed fracture of head of left humerus, initial encounter  Ambulatory referral to Orthopedic Surgery    XR shoulder 2+ vw left    Case request operating room: ARTHROPLASTY SHOULDER REVERSE    Ambulatory referral to Cardiology    Type and screen    CBC and differential    APTT    Protime-INR    Basic metabolic panel    MRSA culture    EKG 12 lead    Case request operating room: ARTHROPLASTY SHOULDER REVERSE    Comfort Arm Sling    PAT Covid Screening   2  Encounter for pre-operative laboratory testing  Ambulatory referral to Cardiology    Type and screen    CBC and differential    APTT    Protime-INR    Basic metabolic panel    MRSA culture    EKG 12 lead    PAT Covid Screening       Scribe Attestation    I,:  Versa Mortimer am acting as a scribe while in the presence of the attending physician :       I,:  Michael Montesinos MD personally performed the services described in this documentation    as scribed in my presence :           Zofia Freedman upon examination and review the x-rays obtained today 2/23/2022 as well as the CT scan of the left shoulder obtained on 2/17/2022 does demonstrate an extensively comminuted displaced fracture of the proximal humerus  I did explain to her and her niece today that due to the extensive nature of the fracture, she does require surgical intervention the form of a reverse total shoulder arthroplasty  She understands that this is a major surgery  I did discuss the procedure with her at length as well as the risks including but not limited to bleeding, infection, medical problems perioperatively, death, nerve injury resulting weakness, numbness, pain, dislocation, prosthesis rejection, stiffness, recurrent injury, blood clot, failure surgery, fracture while implanting the prosthesis, and need for further surgery  I did also remark that I will likely incorporate her existing scar into the incision  As the existing scar is the incision location    She did verbalize understanding and verbalized consent  Her niece was present to sign the consent for a left reverse total shoulder arthroplasty as Netta Garay stated that she was unable to sign due to her injury  She did state that she understood the risks and benefits of this procedure and agreed to have her niece sign consent for her  She was instructed that she is not to take any of her Eliquis for at least 48 hours prior to surgery  This was also indicated on her rehab center paperwork she does have a follow-up visit with Dr Ai Bunch tomorrow 2/24/2022  She will be provided with a postoperative sling  She will meet with my surgical scheduler to set up a date and time with expectation of having this completed on 3/1/2022  All questions were answered and patient and niece verbalized understanding  Subjective:   Stacy Salvador is a 73 y o  female who presents to the office today for initial evaluation of her left shoulder  She unfortunately suffered a mechanical fall on 2/16/2022 when she missed grabbing onto her stair railing resulting in her falling backwards onto her left side from the stairs  She states that she believes that she struck her elbow 1st but has severe pain into the shoulder  She describes her pain as moderate to severe and does have difficulty managing her pain with over-the-counter oral analgesics  She does have some complaints of elbow pain however is much less severe when compared to her shoulder  She denies any distal paresthesias extending into her left upper extremity  She was evaluated at the emergency department on 2/16/2022 where she did have multiple images completed of the shoulder as well as the elbow  The x-rays and CT scan of the left shoulder does demonstrate a comminuted displaced fracture of the proximal humerus  She does have a history of a rotator cuff repair performed many years ago  She states she did successfully recovered from that surgery   She was consult by   Chantale Favors the surgical a reverse total shoulder arthroplasty  She states that she is currently on Eliquis for history a that  She remarks that she only had 1 episode of Afib demonstrated EKG  Review of Systems   Constitutional: Negative for chills, fever and unexpected weight change  HENT: Negative for hearing loss, nosebleeds and sore throat  Eyes: Negative for pain, redness and visual disturbance  Respiratory: Negative for cough, shortness of breath and wheezing  Cardiovascular: Negative for chest pain, palpitations and leg swelling  Gastrointestinal: Negative for abdominal pain, nausea and vomiting  Endocrine: Negative for polydipsia and polyuria  Genitourinary: Negative for dysuria and hematuria  Musculoskeletal: Positive for arthralgias and myalgias  See HPI   Skin: Negative for rash and wound  Neurological: Negative for dizziness, numbness and headaches  Psychiatric/Behavioral: Negative for decreased concentration and suicidal ideas  The patient is not nervous/anxious            Past Medical History:   Diagnosis Date    [facility]     Asthma     CKD (chronic kidney disease)     Diabetes mellitus (HCC)     GERD (gastroesophageal reflux disease)     Hyperlipidemia     Hypertension     Kidney stones     PONV (postoperative nausea and vomiting)     [facility] ([facility])        Past Surgical History:   Procedure Laterality Date    APPENDECTOMY      CYSTOSCOPY W/ LASER LITHOTRIPSY Left 7/12/2016    Procedure: CYSTOSCOPY URETEROSCOPY WITH LITHOTRIPSY HOLMIUM LASER, RETROGRADE PYELOGRAM AND INSERTION STENT URETERAL;  Surgeon: Maritza Allan MD;  Location: [address] [place] [address];  Service:     DILATION AND CURETTAGE OF UTERUS      JOINT REPLACEMENT      right knee    KNEE ARTHROPLASTY Right     [place] CYSTOURETHROSCOPY,URETER CATHETER Left 6/29/2016    Procedure: CYSTOSCOPY RETROGRADE PYELOGRAM WITH INSERTION STENT URETERAL, left;  Surgeon: Orlando Rangel Sukh Paulino MD;  Location: [address] [place] [address];  Service: Urology    SHOULDER ARTHROTOMY Left        Family History   Problem Relation Age of Onset    Heart disease Mother     Emphysema Maternal Grandmother     Heart disease Family        Social History     Occupational History    Not on file   Tobacco Use    Smoking status: Former Smoker     Packs/day: 1 00     Years: 20 00     Pack years: 20 00     Types: Cigarettes     Quit date: 1996     Years since quittin 6    Smokeless tobacco: Never Used   Substance and Sexual Activity    Alcohol use: Not Currently     Comment: rarely    Drug use: No    Sexual activity: Not on file         Current Outpatient Medications:     albuterol (PROVENTIL HFA,VENTOLIN HFA) 90 mcg/act inhaler, Inhale 2 puffs every 6 (six) hours as needed for wheezing, Disp: , Rfl:     amLODIPine (NORVASC) 5 mg tablet, Take 1 tablet (5 mg total) by mouth daily, Disp: 30 tablet, Rfl: 0    ammonium lactate (LAC-HYDRIN) 12 % lotion, APPLY TOPICALLY TO AFFECTED AREAS twice a day, Disp: , Rfl:     apixaban (ELIQUIS) 5 mg, Take 1 tablet (5 mg total) by mouth 2 (two) times a day, Disp: 60 tablet, Rfl: 0    desloratadine (CLARINEX) 5 MG tablet, Take by mouth, Disp: , Rfl:     Insulin Glargine (TOUJEO SOLOSTAR) injection pen 300 units/mL, Inject 40 Units under the skin 2 (two) times a day  , Disp: , Rfl:     insulin lispro (HUMALOG) 100 units/mL injection, Inject under the skin 3 (three) times a day before meals Inject 5 units subq every 8 hours before meals if fasting BS>125, Disp: , Rfl:     lidocaine (LIDODERM) 5 %, Apply 2 patches topically daily Remove & Discard patch within 12 hours or as directed by MD, Disp: , Rfl: 0    metoprolol tartrate (LOPRESSOR) 25 mg tablet, Take 2 tablets (50 mg total) by mouth every 12 (twelve) hours, Disp: 180 tablet, Rfl: 3    montelukast (SINGULAIR) 10 mg tablet, Take 10 mg by mouth daily  , Disp: , Rfl:     NOVOFINE AUTOCOVER 30G X 8 MM MISC, , Disp: , Rfl: 0    ondansetron (Zofran ODT) 4 mg disintegrating tablet, Take 1 tablet (4 mg total) by mouth every 6 (six) hours as needed for nausea or vomiting, Disp: 20 tablet, Rfl: 0    pregabalin (LYRICA) 100 mg capsule, Take 100 mg by mouth 2 (two) times a day , Disp: , Rfl:     rosuvastatin (CRESTOR) 10 MG tablet, , Disp: , Rfl: 0    traMADol (ULTRAM) 50 mg tablet, Take 1 tablet (50 mg total) by mouth every 6 (six) hours as needed for moderate pain for up to 10 days, Disp: , Rfl: 0    Trulicity 1 5 DQ/1 6YQ SOPN, inject 0 5 milliliter subcutaneously every week 28, Disp: , Rfl:     Allergies   Allergen Reactions    Asa [Aspirin] GI Intolerance    Indocin [Indomethacin] Other (See Comments)     Made patient "loopy"    Moxifloxacin Other (See Comments)    Other Other (See Comments)    Penicillins Hives    Percocet [Oxycodone-Acetaminophen]     Zinc Acetate Other (See Comments)       Objective:  Vitals:    02/23/22 1034   BP: 121/69   Pulse: 77       Left Shoulder Exam     Tenderness   Left shoulder tenderness location: anterior     Muscle Strength   Left shoulder normal muscle strength: EPL: 5/5, FPL: 5/5, 1st dorsal interossesus: 5/5, APB: 5/5  Other   Erythema: absent  Scars: present  Sensation: normal  Pulse: present     Comments:  ROM and strength at shoulder omitted due to fracture  Sensation light touch is intact about the left upper extremity in the axillary nerve distribution as well as throughout C5 through T1 with a 2+ radial pulse  There is previous surgical scar from previous rotator cuff repair surgery  This is a scar from an open procedure  It is curvilinear in nature and will be incorporated with her surgical incision for the reverse total shoulder arthroplasty  Physical Exam  Vitals reviewed  HENT:      Head: Normocephalic and atraumatic  Eyes:      General:         Right eye: No discharge  Left eye: No discharge        Conjunctiva/sclera: Conjunctivae normal  Pupils: Pupils are equal, round, and reactive to light  Cardiovascular:      Rate and Rhythm: Normal rate  Heart sounds: Normal heart sounds  Pulmonary:      Effort: Pulmonary effort is normal  No respiratory distress  Breath sounds: Normal breath sounds  Musculoskeletal:      Cervical back: Normal range of motion and neck supple  Comments: As noted in HPI   Skin:     General: Skin is warm and dry  Neurological:      Mental Status: She is alert and oriented to person, place, and time  I have personally reviewed pertinent films in PACS and my interpretation is as follows:    X-rays of the left shoulder obtained today 2/23/2022 demonstrates a comminuted displaced proximal humerus fracture with a located humeral head  CT scan of the left upper extremity obtained on 2/17/2022 demonstrates a extensively comminuted fracture of the surgical neck of the left proximal humerus and humeral head  Glenohumeral joint does appear to be located

## 2022-02-24 ENCOUNTER — OFFICE VISIT (OUTPATIENT)
Dept: CARDIOLOGY CLINIC | Facility: CLINIC | Age: 72
End: 2022-02-24
Payer: MEDICARE

## 2022-02-24 VITALS
TEMPERATURE: 97.2 F | HEART RATE: 77 BPM | DIASTOLIC BLOOD PRESSURE: 74 MMHG | OXYGEN SATURATION: 100 % | SYSTOLIC BLOOD PRESSURE: 122 MMHG

## 2022-02-24 DIAGNOSIS — E78.2 MIXED HYPERLIPIDEMIA: ICD-10-CM

## 2022-02-24 DIAGNOSIS — E11.9 DIABETES MELLITUS TYPE 2 IN NONOBESE (HCC): ICD-10-CM

## 2022-02-24 DIAGNOSIS — S42.292A CLOSED FRACTURE OF HEAD OF LEFT HUMERUS, INITIAL ENCOUNTER: ICD-10-CM

## 2022-02-24 DIAGNOSIS — Z01.812 ENCOUNTER FOR PRE-OPERATIVE LABORATORY TESTING: ICD-10-CM

## 2022-02-24 DIAGNOSIS — Z01.810 PRE-OPERATIVE CARDIOVASCULAR EXAMINATION: Primary | ICD-10-CM

## 2022-02-24 DIAGNOSIS — I10 ESSENTIAL HYPERTENSION: ICD-10-CM

## 2022-02-24 DIAGNOSIS — I48.0 PAROXYSMAL ATRIAL FIBRILLATION (HCC): ICD-10-CM

## 2022-02-24 PROCEDURE — 99214 OFFICE O/P EST MOD 30 MIN: CPT | Performed by: INTERNAL MEDICINE

## 2022-02-24 PROCEDURE — 93010 ELECTROCARDIOGRAM REPORT: CPT | Performed by: INTERNAL MEDICINE

## 2022-02-24 RX ORDER — NYSTATIN 100000 [USP'U]/G
POWDER TOPICAL 2 TIMES DAILY
COMMUNITY
End: 2022-07-11

## 2022-02-24 RX ORDER — ACETAMINOPHEN 325 MG/1
650 TABLET ORAL EVERY 6 HOURS PRN
COMMUNITY

## 2022-02-24 NOTE — PRE-PROCEDURE INSTRUCTIONS
My Surgical Experience    The following information was developed to assist you to prepare for your operation  What do I need to do before coming to the hospital?   Arrange for a responsible person to drive you to and from the hospital    Arrange care for your children at home  Children are not allowed in the recovery areas of the hospital   Plan to wear clothing that is easy to put on and take off  If you are having shoulder surgery, wear a shirt that buttons or zippers in the front  Bathing  o Shower the evening before and the morning of your surgery with an antibacterial soap  Please refer to the Pre Op Showering Instructions for Surgery Patients Sheet   o Remove nail polish and all body piercing jewelry  o Do not shave any body part for at least 24 hours before surgery-this includes face, arms, legs and upper body  Food  o Nothing to eat or drink after midnight the night before your surgery  This includes candy and chewing gum  o Exception: If your surgery is after 12:00pm (noon), you may have clear liquids such as 7-Up®, ginger ale, apple or cranberry juice, Jell-O®, water, or clear broth until 8:00 am  o Do not drink milk or juice with pulp on the morning before surgery  o Do not drink alcohol 24 hours before surgery  Medicine  o Follow instructions you received from your surgeon about which medicines you may take on the day of surgery  o If instructed to take medicine on the morning of surgery, take pills with just a small sip of water  Call your prescribing doctor for specific infroamtion on what to do if you take insulin    What should I bring to the hospital?    Bring:  Jigar Louis or a walker, if you have them, for foot or knee surgery   A list of the daily medicines, vitamins, minerals, herbals and nutritional supplements you take   Include the dosages of medicines and the time you take them each day   Glasses, dentures or hearing aids   Minimal clothing; you will be wearing hospital maci   Photo ID; required to verify your identity   If you have a Living Will or Power of , bring a copy of the documents   If you have an ostomy, bring an extra pouch and any supplies you use    Do not bring   Medicines or inhalers   Money, valuables or jewelry    What other information should I know about the day of surgery?  Notify your surgeons if you develop a cold, sore throat, cough, fever, rash or any other illness   Report to the Ambulatory Surgical/Same Day Surgery Unit   You will be instructed to stop at Registration only if you have not been pre-registered   Inform your  fi they do not stay that they will be asked by the staff to leave a phone number where they can be reached   Be available to be reached before surgery  In the event the operating room schedule changes, you may be asked to come in earlier or later than expected    *It is important to tell your doctor and others involved in your health care if you are taking or have been taking any non-prescription drugs, vitamins, minerals, herbals or other nutritional supplements  Any of these may interact with some food or medicines and cause a reaction      Pre-Surgery Instructions:   Medication Instructions    acetaminophen (TYLENOL) 325 mg tablet Instructed patient per Anesthesia Guidelines   albuterol (PROVENTIL HFA,VENTOLIN HFA) 90 mcg/act inhaler Instructed patient per Anesthesia Guidelines   amLODIPine (NORVASC) 5 mg tablet Instructed patient per Anesthesia Guidelines   ammonium lactate (LAC-HYDRIN) 12 % lotion Instructed patient per Anesthesia Guidelines   apixaban (ELIQUIS) 5 mg Instructed patient per Anesthesia Guidelines   desloratadine (CLARINEX) 5 MG tablet Instructed patient per Anesthesia Guidelines   Insulin Glargine (TOUJEO SOLOSTAR) injection pen 300 units/mL Instructed patient per Anesthesia Guidelines      insulin lispro (HUMALOG) 100 units/mL injection Instructed patient per Anesthesia Guidelines   lidocaine (LIDODERM) 5 % Instructed patient per Anesthesia Guidelines   metoprolol tartrate (LOPRESSOR) 25 mg tablet Instructed patient per Anesthesia Guidelines   montelukast (SINGULAIR) 10 mg tablet Instructed patient per Anesthesia Guidelines   NOVOFINE AUTOCOVER 30G X 8 MM MISC Instructed patient per Anesthesia Guidelines   nystatin (MYCOSTATIN) powder Instructed patient per Anesthesia Guidelines   pregabalin (LYRICA) 100 mg capsule Instructed patient per Anesthesia Guidelines   rosuvastatin (CRESTOR) 10 MG tablet Instructed patient per Anesthesia Guidelines   traMADol (ULTRAM) 50 mg tablet Instructed patient per Anesthesia Guidelines   Trulicity 1 5 LM/2 1BO SOPN Instructed patient per Anesthesia Guidelines  To take amlodipine, metoprolol and lyrica a m  of surgeryto check with surgeon when to hold eliquis

## 2022-02-24 NOTE — PROGRESS NOTES
Cardiology Follow Up  Constantine Gutierrez  1950  [id number]  [facility] [address]  [address] [address], [address] [facility]  [place] [id number]  [phone]    1  Pre-operative cardiovascular examination  POCT ECG   2  Diabetes mellitus type 2 in nonobese [facility])  POCT ECG   3  Paroxysmal atrial fibrillation (HCC)  POCT ECG   4  Essential hypertension  POCT ECG   5  Mixed hyperlipidemia  POCT ECG   6  Closed fracture of head of left humerus, initial encounter  Ambulatory referral to Cardiology   7  Encounter for pre-operative laboratory testing  Ambulatory referral to Cardiology      Discussion/Plan:  Preoperative- she is near her baseline weight  She has chronic lower extremity swelling  Her EKG is in sinus rhythm with APCs no acute ST changes  She denies having active chest pain  Her blood pressure is well controlled  Intermediate cardiac risk  She has a history of insulin dependent diabetes mellitus with last A1c 7 5  No cardiac contraindication for anesthesia or orthopedic surgery  No contraindication to hold Eliquis 3 days prior to procedure  I have instructed the patient to take metoprolol even day of her procedure  Hold diuretic day of procedure  SVT paroxysmal- remote episode of afib  continue metoprolol 50 mg twice a day  She is currently on eliquis 5mg bid  Her chads Vasc score is 3  Sleep study is mild  We have never seen clear-cut atrial fibrillation- she had a sister rhythm of atrial tachycardia noted in the hospital   Discussed with patient and daughter if no recurrence of atrial fibrillation on monitoring consideration to discontinue Eliquis  Mild sleep apnea- weight loss  Off her oral mandibular device  Acute on chronic diastolic hf- continue torsemide 10mg daily  Low salt diet  Continue metoprolol 50mg twice a day  Low sodium diet  Leg elevation    Compression socks of lower extremities    Diabetes mellitus with insulin dependence last A1c 7 5    Chronic kidney disease- followed by Nephrology  Controlled    Abnormal sleep screen- sleep study pending    Diabetes mellitus- on insulin    Cellulitis- on antibiotics    Lymphedema- compression sock knee high      Interval History:  79year-old with a history of SVT, abnormal sleep screen with recent hospital admission secondary to acute infection noted to have periodic atrial tachycardia  She reports having continued symptoms at night  She is pending a sleep study  She is currently on metoprolol 100 mg twice a day  She also taking eliquis 5mg twice a day  Her lower extremity edema is better    02/24/2022:  She has had an unfortunate mechanical fall  She denies having chest heaviness  She denies feeling dizziness or lightheadedness  She has chronic lower extremity swelling      Patient Active Problem List   Diagnosis    Asthma    Diabetes mellitus type 2 in nonobese ([place] [address] )    Morbid obesity with BMI of 45 0-49 9, adult ([place] [address] )    Lower leg edema    Paroxysmal atrial fibrillation (HCC)    Mixed hyperlipidemia    Anemia    Essential hypertension    Acute kidney failure, unspecified ([facility])    Acute kidney injury superimposed on CKD ([place] [address] )    Humeral head fracture     Past Medical History:   Diagnosis Date    A-fib ([place] [address] )     Anemia     Asthma     Chronic kidney disease     CKD (chronic kidney disease)     Diabetes mellitus (HCC)     GERD (gastroesophageal reflux disease)     Hyperlipidemia     Hypertension     Kidney stones     PONV (postoperative nausea and vomiting)     SVT (supraventricular tachycardia) ([facility])      Social History     Socioeconomic History    Marital status: Single     Spouse name: Not on file    Number of children: Not on file    Years of education: Not on file    Highest education level: Not on file   Occupational History    Not on file   Tobacco Use    Smoking status: Former Smoker     Packs/day: 1 00     Years: 20 00 Pack years: 20 00     Types: Cigarettes     Quit date: 1996     Years since quittin 6    Smokeless tobacco: Never Used   Vaping Use    Vaping Use: Never used   Substance and Sexual Activity    Alcohol use: Not Currently     Comment: rarely    Drug use: No    Sexual activity: Not Currently   Other Topics Concern    Not on file   Social History Narrative    Not on file     Social Determinants of Health     Financial Resource Strain: Not on file   Food Insecurity: No Food Insecurity    Worried About Running Out of Food in the Last Year: Never true    Wes of Food in the Last Year: Never true   Transportation Needs: No Transportation Needs    Lack of Transportation (Medical): No    Lack of Transportation (Non-Medical):  No   Physical Activity: Not on file   Stress: Not on file   Social Connections: Not on file   Intimate Partner Violence: Not on file   Housing Stability: Low Risk     Unable to Pay for Housing in the Last Year: No    Number of Places Lived in the Last Year: 1    Unstable Housing in the Last Year: No      Family History   Problem Relation Age of Onset    Heart disease Mother     Emphysema Maternal Grandmother     Heart disease Family      Past Surgical History:   Procedure Laterality Date    APPENDECTOMY      CYSTOSCOPY W/ LASER LITHOTRIPSY Left 2016    Procedure: CYSTOSCOPY URETEROSCOPY WITH LITHOTRIPSY HOLMIUM LASER, RETROGRADE PYELOGRAM AND INSERTION STENT URETERAL;  Surgeon: Vivienne Rowe MD;  Location: [address] [place] [address];  Service:     DILATION AND CURETTAGE OF UTERUS      JOINT REPLACEMENT      right knee    KNEE ARTHROPLASTY Right     TX CYSTOURETHROSCOPY,URETER CATHETER Left 2016    Procedure: CYSTOSCOPY RETROGRADE PYELOGRAM WITH INSERTION STENT URETERAL, left;  Surgeon: Vivienne Rowe MD;  Location: [address] [place] [address];  Service: Urology    SHOULDER ARTHROTOMY Left        Current Outpatient Medications:     albuterol (PROVENTIL HFA,VENTOLIN HFA) 90 mcg/act inhaler, Inhale 2 puffs every 6 (six) hours as needed for wheezing, Disp: , Rfl:     amLODIPine (NORVASC) 5 mg tablet, Take 1 tablet (5 mg total) by mouth daily, Disp: 30 tablet, Rfl: 0    ammonium lactate (LAC-HYDRIN) 12 % lotion, APPLY TOPICALLY TO AFFECTED AREAS twice a day, Disp: , Rfl:     apixaban (ELIQUIS) 5 mg, Take 1 tablet (5 mg total) by mouth 2 (two) times a day (Patient taking differently: Take 5 mg by mouth 2 (two) times a day Staff to check with surgeon when to hold ), Disp: 60 tablet, Rfl: 0    desloratadine (CLARINEX) 5 MG tablet, Take by mouth daily  , Disp: , Rfl:     Insulin Glargine (TOUJEO SOLOSTAR) injection pen 300 units/mL, Inject 40 Units under the skin 2 (two) times a day  , Disp: , Rfl:     insulin lispro (HUMALOG) 100 units/mL injection, Inject under the skin 3 (three) times a day before meals Inject 5 units subq every 8 hours before meals if fasting BS>125, Disp: , Rfl:     metoprolol tartrate (LOPRESSOR) 25 mg tablet, Take 2 tablets (50 mg total) by mouth every 12 (twelve) hours, Disp: 180 tablet, Rfl: 3    montelukast (SINGULAIR) 10 mg tablet, Take 10 mg by mouth daily  , Disp: , Rfl:     NOVOFINE AUTOCOVER 30G X 8 MM MISC, , Disp: , Rfl: 0    nystatin (MYCOSTATIN) powder, Apply topically 2 (two) times a day, Disp: , Rfl:     pregabalin (LYRICA) 100 mg capsule, Take 100 mg by mouth 2 (two) times a day , Disp: , Rfl:     rosuvastatin (CRESTOR) 10 MG tablet, 10 mg daily  , Disp: , Rfl: 0    traMADol (ULTRAM) 50 mg tablet, Take 1 tablet (50 mg total) by mouth every 6 (six) hours as needed for moderate pain for up to 10 days, Disp: , Rfl: 0    Trulicity 1 5 ZW/0 6QR SOPN, inject 0 5 milliliter subcutaneously every week 28, Disp: , Rfl:     acetaminophen (TYLENOL) 325 mg tablet, Take 650 mg by mouth every 6 (six) hours as needed for mild pain (Patient not taking: Reported on 2/24/2022 ), Disp: , Rfl:     lidocaine (LIDODERM) 5 %, Apply 2 patches topically daily Remove & Discard patch within 12 hours or as directed by MD, Disp: , Rfl: 0    ondansetron (Zofran ODT) 4 mg disintegrating tablet, Take 1 tablet (4 mg total) by mouth every 6 (six) hours as needed for nausea or vomiting (Patient not taking: Reported on 2/24/2022 ), Disp: 20 tablet, Rfl: 0  Allergies   Allergen Reactions    Penicillins Hives    Moxifloxacin Other (See Comments)     unknown    Percocet [Oxycodone-Acetaminophen] Other (See Comments)     unknown    Zinc Acetate Other (See Comments)     unknown    Nuts - Food Allergy Other (See Comments)    Asa [Aspirin] GI Intolerance    Indocin [Indomethacin] Other (See Comments)     Made patient "loopy"    Other Other (See Comments)     unknown       Review of Systems:  Review of Systems   Constitutional: Negative  Negative for activity change, appetite change, chills, diaphoresis, fatigue, fever and unexpected weight change  HENT: Negative  Negative for congestion, dental problem, drooling, ear discharge, ear pain, facial swelling, hearing loss, mouth sores, nosebleeds, postnasal drip, rhinorrhea, sinus pressure, sinus pain, sneezing, sore throat, tinnitus, trouble swallowing and voice change  Eyes: Negative  Negative for photophobia, pain, redness, itching and visual disturbance  Respiratory: Negative  Negative for apnea, cough, choking, chest tightness, shortness of breath, wheezing and stridor  Cardiovascular: Positive for palpitations and leg swelling  Negative for chest pain  Gastrointestinal: Negative  Negative for abdominal distention, abdominal pain, anal bleeding, blood in stool, constipation, diarrhea, nausea, rectal pain and vomiting  Endocrine: Negative  Negative for cold intolerance, heat intolerance, polydipsia, polyphagia and polyuria  Genitourinary: Negative    Negative for decreased urine volume, difficulty urinating, dyspareunia, dysuria, enuresis, flank pain, frequency, genital sores, hematuria, menstrual problem, pelvic pain, urgency, vaginal bleeding, vaginal discharge and vaginal pain  Musculoskeletal: Negative  Negative for arthralgias, back pain, gait problem, joint swelling, myalgias, neck pain and neck stiffness  Skin: Negative  Negative for color change, pallor, rash and wound  Allergic/Immunologic: Negative  Negative for environmental allergies, food allergies and immunocompromised state  Neurological: Negative  Negative for dizziness, tremors, seizures, syncope, facial asymmetry, speech difficulty, weakness, light-headedness, numbness and headaches  Hematological: Negative  Negative for adenopathy  Does not bruise/bleed easily  Psychiatric/Behavioral: Negative  Negative for agitation, behavioral problems, confusion, decreased concentration, dysphoric mood, hallucinations, self-injury, sleep disturbance and suicidal ideas  The patient is not nervous/anxious and is not hyperactive  All other systems reviewed and are negative  Vitals:    02/24/22 1456   BP: 122/74   BP Location: Right arm   Patient Position: Sitting   Cuff Size: Standard   Pulse: 77   Temp: (!) 97 2 °F (36 2 °C)   SpO2: 100%     Physical Exam:  Physical Exam  Constitutional:       General: She is not in acute distress  Appearance: She is well-developed  She is not diaphoretic  HENT:      Head: Normocephalic and atraumatic  Right Ear: External ear normal       Left Ear: External ear normal    Eyes:      General: No scleral icterus  Right eye: No discharge  Left eye: No discharge  Conjunctiva/sclera: Conjunctivae normal       Pupils: Pupils are equal, round, and reactive to light  Neck:      Thyroid: No thyromegaly  Vascular: No JVD  Trachea: No tracheal deviation  Cardiovascular:      Rate and Rhythm: Normal rate and regular rhythm  Heart sounds: No murmur heard  No friction rub  Gallop present  Pulmonary:      Effort: Pulmonary effort is normal  No respiratory distress  Breath sounds: Normal breath sounds  No stridor  No wheezing or rales  Chest:      Chest wall: No tenderness  Abdominal:      General: Bowel sounds are normal  There is distension  Palpations: Abdomen is soft  There is no mass  Tenderness: There is no abdominal tenderness  There is no guarding or rebound  Musculoskeletal:         General: Swelling, deformity and signs of injury present  No tenderness  Normal range of motion  Cervical back: Normal range of motion and neck supple  Skin:     General: Skin is warm and dry  Coloration: Skin is not pale  Findings: No erythema or rash  Neurological:      Mental Status: She is alert and oriented to person, place, and time  Cranial Nerves: No cranial nerve deficit  Motor: No abnormal muscle tone  Coordination: Coordination normal       Deep Tendon Reflexes: Reflexes are normal and symmetric  Reflexes normal    Psychiatric:         Behavior: Behavior normal          Thought Content: Thought content normal          Judgment: Judgment normal          Labs:     Lab Results   Component Value Date    WBC 9 79 02/19/2022    HGB 7 9 (L) 02/19/2022    HCT 24 7 (L) 02/19/2022    MCV 92 02/19/2022     02/19/2022     Lab Results   Component Value Date    K 4 6 02/20/2022     02/20/2022    CO2 26 02/20/2022    BUN 54 (H) 02/20/2022    CREATININE 1 68 (H) 02/20/2022    GLUF 224 (H) 10/18/2019    CALCIUM 7 0 (L) 02/20/2022    CORRECTEDCA 8 7 02/16/2022    AST 17 02/16/2022    ALT 27 02/16/2022    ALKPHOS 74 02/16/2022    EGFR 30 02/20/2022     No results found for: CHOL  No results found for: HDL  No results found for: LDLCALC  No results found for: TRIG  Lab Results   Component Value Date    HGBA1C 10 3 01/21/2020       Imaging & Testing   I have personally reviewed pertinent reports  EKG: Personally reviewed  Normal sinus rhythm poor R-wave progression no acute ST changes unchanged from prior      Cardiac testing: Results for orders placed during the hospital encounter of 19   Echo complete with contrast if indicated    Narrative [address]  [address] [facility]  [place] [address]  [phone]    Transthoracic Echocardiogram  2D, M-mode, Doppler, and Color Doppler    Study date:  29-May-2019    Patient: González Giraldo  MR number: [id number]  Account number: [de-identified]  : 1950  Age: 79 years  Gender: Female  Status: Inpatient  Location: Bedside  Height: 62 in  Weight: 250 6 lb  BP: 133/ 62 mmHg    Indications: Tachycardia    Diagnoses: I11 9 - Hypertensive heart disease without heart failure    Sonographer:  QUETA Vora  Referring Physician:  Mable Larson MD  Group:  [facility]  Interpreting Physician:  Svitlana Poole DO    SUMMARY    LEFT VENTRICLE:  Systolic function was normal by visual assessment  Ejection fraction was estimated to be 55 %  There were no regional wall motion abnormalities  Wall thickness was mildly to moderately increased  Doppler parameters were consistent with abnormal left ventricular relaxation (grade 1 diastolic dysfunction)  MITRAL VALVE:  There was mild regurgitation  AORTIC VALVE:  There was no evidence for stenosis  There was no regurgitation  TRICUSPID VALVE:  There was mild regurgitation  Pulmonary artery systolic pressure was within the normal range  HISTORY: PRIOR HISTORY: Diabetes,Diabetes, HTN, Hyperlipidemia, A Fib  PROCEDURE: The procedure was performed at the bedside  This was a routine study  The transthoracic approach was used  The study included complete 2D imaging, M-mode, complete spectral Doppler, and color Doppler  The heart rate was 77 bpm,  at the start of the study  Images were obtained from the parasternal, apical, subcostal, and suprasternal notch acoustic windows   Echocardiographic views were limited due to restricted patient mobility, poor patient compliance, poor  acoustic window availability, decreased penetration, and lung interference  This was a technically difficult study  LEFT VENTRICLE: Size was normal  Systolic function was normal by visual assessment  Ejection fraction was estimated to be 55 %  There were no regional wall motion abnormalities  Wall thickness was mildly to moderately increased  DOPPLER:  Doppler parameters were consistent with abnormal left ventricular relaxation (grade 1 diastolic dysfunction)  RIGHT VENTRICLE: The size was normal  Systolic function was normal     LEFT ATRIUM: The atrium was mildly dilated  RIGHT ATRIUM: Size was normal     MITRAL VALVE: Valve structure was normal  There was normal leaflet separation  No echocardiographic evidence for prolapse  DOPPLER: The transmitral velocity was within the normal range  There was no evidence for stenosis  There was mild  regurgitation  AORTIC VALVE: The valve was trileaflet  Leaflets exhibited normal thickness, normal cuspal separation, and sclerosis  DOPPLER: Transaortic velocity was within the normal range  There was no evidence for stenosis  There was no  regurgitation  TRICUSPID VALVE: The valve structure was normal  There was normal leaflet separation  DOPPLER: The transtricuspid velocity was within the normal range  There was mild regurgitation  Pulmonary artery systolic pressure was within the normal  range  Estimated peak PA pressure was 32 mmHg  PULMONIC VALVE: Leaflets exhibited normal thickness, no calcification, and normal cuspal separation  DOPPLER: The transpulmonic velocity was within the normal range  There was no regurgitation  PERICARDIUM: There was no thickening  There was no pericardial effusion  AORTA: The root exhibited normal size      PULMONARY ARTERY: The size was normal  The morphology appeared normal     SYSTEM MEASUREMENT TABLES    2D mode  AoR Diam 2D: 3 6 cm  LA Diam (2D): 3 9 cm  LA/Ao (2D): 1 08  FS (2D Teich): 26 9 %  IVSd (2D): 1 29 cm  LVDEV: 75 1 cmï¾³  LVESV: 35 3 cmï¾³  LVIDd(2D): 4 12 cm  LVISd (2D): 3 01 cm  LVOT Area 2D: 3 14 cmï¾²  LVPWd (2D): 1 2 cm  SV (Teich): 39 8 cmï¾³    Apical four chamber  LVEF A4C: 51 %    Unspecified Scan Mode  HANNA Cont Eq (Peak Gutierrez): 2 58 cmï¾²  LVOT Diam : 2 cm  LVOT Vmax: 963 mm/s  LVOT Vmax; Mean: 963 mm/s  Peak Grad ; Mean: 4 mm[Hg]  MV Peak A Gutierrez: 893 mm/s  MV Peak E Gutierrez  Mean: 805 mm/s  MVA (PHT): 3 67 cmï¾²  PHT: 60 ms  Max P mm[Hg]  V Max: 2360 mm/s  Vmax: 2430 mm/s  RA Area: 12 8 cmï¾²  RA Volume: 27 6 cmï¾³  TAPSE: 2 cm    [facility]    Prepared and electronically signed by    Orlando Rosales DO  Signed 29-May-2019 16:19:07       Sinus rhythm with APCs no acute ST changes    Tod Showers MD Mccabe  Please call with any questions or suggestions    A description of the counseling:   Goals and Barriers:  Patient's ability to self care:  Medication side effect reviewed with patient in detail and all their questions answered  "This note has been constructed using a voice recognition system  Therefore there may be syntax, spelling, and/or grammatical errors   Please call if you have any questions  "

## 2022-02-28 ENCOUNTER — ANESTHESIA EVENT (OUTPATIENT)
Dept: PERIOP | Facility: HOSPITAL | Age: 72
DRG: 483 | End: 2022-02-28
Payer: MEDICARE

## 2022-02-28 PROBLEM — N20.0 NEPHROLITHIASIS: Status: ACTIVE | Noted: 2022-02-28

## 2022-02-28 PROBLEM — R11.2 PONV (POSTOPERATIVE NAUSEA AND VOMITING): Status: ACTIVE | Noted: 2022-02-28

## 2022-02-28 PROBLEM — E11.9 DIABETES MELLITUS, TYPE 2 (HCC): Status: ACTIVE | Noted: 2022-02-28

## 2022-02-28 PROBLEM — K21.9 GASTROESOPHAGEAL REFLUX DISEASE: Status: ACTIVE | Noted: 2022-02-28

## 2022-02-28 PROBLEM — N17.9 ACUTE KIDNEY INJURY SUPERIMPOSED ON CKD (HCC): Status: RESOLVED | Noted: 2019-08-08 | Resolved: 2022-02-28

## 2022-02-28 PROBLEM — N18.9 CHRONIC KIDNEY DISEASE: Status: ACTIVE | Noted: 2022-02-28

## 2022-02-28 PROBLEM — Z79.01 ON CONTINUOUS ORAL ANTICOAGULATION: Status: ACTIVE | Noted: 2022-02-28

## 2022-02-28 PROBLEM — N18.9 ACUTE KIDNEY INJURY SUPERIMPOSED ON CKD (HCC): Status: RESOLVED | Noted: 2019-08-08 | Resolved: 2022-02-28

## 2022-02-28 PROBLEM — M19.90 ARTHRITIS: Status: ACTIVE | Noted: 2022-02-28

## 2022-02-28 PROBLEM — N17.9 ACUTE KIDNEY FAILURE, UNSPECIFIED (HCC): Status: RESOLVED | Noted: 2019-08-08 | Resolved: 2022-02-28

## 2022-02-28 PROBLEM — Z98.890 PONV (POSTOPERATIVE NAUSEA AND VOMITING): Status: ACTIVE | Noted: 2022-02-28

## 2022-02-28 PROBLEM — Z96.651 S/P TOTAL KNEE REPLACEMENT, RIGHT: Status: ACTIVE | Noted: 2022-02-28

## 2022-02-28 NOTE — ANESTHESIA PREPROCEDURE EVALUATION
Procedure:  ARTHROPLASTY SHOULDER REVERSE (Left Shoulder)    Relevant Problems   ANESTHESIA   (+) PONV (postoperative nausea and vomiting)      CARDIO   (+) Essential hypertension   (+) Mixed hyperlipidemia   (+) Paroxysmal atrial fibrillation (HCC) (h/o SVT)      ENDO   (+) Diabetes mellitus, type 2 (HCC)      GI/HEPATIC   (+) Gastroesophageal reflux disease      /RENAL   (+) Acute kidney failure, unspecified (HCC) (Resolved)   (+) Acute kidney injury superimposed on CKD (HCC) (Resolved)   (+) Chronic kidney disease   (+) Nephrolithiasis      HEMATOLOGY   (+) Anemia      MUSCULOSKELETAL   (+) Arthritis      PULMONARY   (+) Asthma      Other   (+) Morbid obesity with BMI of 45 0-49 9, adult (HCC)   (+) On continuous oral anticoagulation - eliquis   (+) S/P total knee replacement, right        Physical Exam    Airway    Mallampati score: II  TM Distance: >3 FB  Neck ROM: full     Dental       Cardiovascular  Rhythm: regular, Rate: normal,     Pulmonary  Breath sounds clear to auscultation,     Other Findings        Anesthesia Plan  ASA Score- 3     Anesthesia Type- general with ASA Monitors  Additional Monitors:   Airway Plan: ETT  Comment: Possible ETT due to airway concerns related to BMI;  Recommend TIVA due to PONV history; Left interscalene block  Patient arrived with high potasium and possible ectopy on EKG  Consulted cardiology, clearance given for surgery (see note)  Plan Factors-    Chart reviewed  Patient is not a current smoker  Induction- intravenous  Postoperative Plan- Plan for postoperative opioid use  Informed Consent- Anesthetic plan and risks discussed with patient  I personally reviewed this patient with the CRNA  Discussed and agreed on the Anesthesia Plan with the CRNA  Laney Rayo

## 2022-03-01 ENCOUNTER — HOSPITAL ENCOUNTER (INPATIENT)
Facility: HOSPITAL | Age: 72
LOS: 4 days | Discharge: NURSING, REHAB OR LONG-TERM CARE | DRG: 483 | End: 2022-03-07
Attending: ORTHOPAEDIC SURGERY | Admitting: ORTHOPAEDIC SURGERY
Payer: MEDICARE

## 2022-03-01 ENCOUNTER — ANESTHESIA (OUTPATIENT)
Dept: PERIOP | Facility: HOSPITAL | Age: 72
DRG: 483 | End: 2022-03-01
Payer: MEDICARE

## 2022-03-01 ENCOUNTER — APPOINTMENT (OUTPATIENT)
Dept: RADIOLOGY | Facility: HOSPITAL | Age: 72
DRG: 483 | End: 2022-03-01
Payer: MEDICARE

## 2022-03-01 DIAGNOSIS — E11.9 DIABETES MELLITUS, TYPE 2 (HCC): ICD-10-CM

## 2022-03-01 DIAGNOSIS — I48.0 PAROXYSMAL ATRIAL FIBRILLATION (HCC): Primary | ICD-10-CM

## 2022-03-01 DIAGNOSIS — N18.9 ACUTE RENAL FAILURE SUPERIMPOSED ON CHRONIC KIDNEY DISEASE (HCC): ICD-10-CM

## 2022-03-01 DIAGNOSIS — E87.5 HYPERKALEMIA: ICD-10-CM

## 2022-03-01 DIAGNOSIS — N17.9 ACUTE RENAL FAILURE SUPERIMPOSED ON CHRONIC KIDNEY DISEASE (HCC): ICD-10-CM

## 2022-03-01 DIAGNOSIS — S42.292A CLOSED FRACTURE OF HEAD OF LEFT HUMERUS, INITIAL ENCOUNTER: ICD-10-CM

## 2022-03-01 LAB
ALBUMIN SERPL BCP-MCNC: 2.8 G/DL (ref 3.5–5)
ALP SERPL-CCNC: 69 U/L (ref 46–116)
ALT SERPL W P-5'-P-CCNC: 23 U/L (ref 12–78)
ANION GAP SERPL CALCULATED.3IONS-SCNC: 4 MMOL/L (ref 4–13)
AST SERPL W P-5'-P-CCNC: 23 U/L (ref 5–45)
ATRIAL RATE: 117 BPM
ATRIAL RATE: 156 BPM
ATRIAL RATE: 74 BPM
ATRIAL RATE: 74 BPM
BILIRUB SERPL-MCNC: 0.77 MG/DL (ref 0.2–1)
BUN SERPL-MCNC: 24 MG/DL (ref 5–25)
CALCIUM ALBUM COR SERPL-MCNC: 9.4 MG/DL (ref 8.3–10.1)
CALCIUM SERPL-MCNC: 8.4 MG/DL (ref 8.3–10.1)
CHLORIDE SERPL-SCNC: 107 MMOL/L (ref 100–108)
CO2 SERPL-SCNC: 28 MMOL/L (ref 21–32)
CREAT SERPL-MCNC: 1.03 MG/DL (ref 0.6–1.3)
GFR SERPL CREATININE-BSD FRML MDRD: 55 ML/MIN/1.73SQ M
GLUCOSE P FAST SERPL-MCNC: 74 MG/DL (ref 65–99)
GLUCOSE SERPL-MCNC: 121 MG/DL (ref 65–140)
GLUCOSE SERPL-MCNC: 63 MG/DL (ref 65–140)
GLUCOSE SERPL-MCNC: 63 MG/DL (ref 65–140)
GLUCOSE SERPL-MCNC: 70 MG/DL (ref 65–140)
GLUCOSE SERPL-MCNC: 74 MG/DL (ref 65–140)
GLUCOSE SERPL-MCNC: 85 MG/DL (ref 65–140)
P AXIS: 22 DEGREES
P AXIS: 37 DEGREES
P AXIS: 4 DEGREES
POTASSIUM SERPL-SCNC: 5.7 MMOL/L (ref 3.5–5.3)
PR INTERVAL: 124 MS
PR INTERVAL: 170 MS
PR INTERVAL: 178 MS
PROT SERPL-MCNC: 6.4 G/DL (ref 6.4–8.2)
QRS AXIS: -1 DEGREES
QRS AXIS: 210 DEGREES
QRS AXIS: 32 DEGREES
QRS AXIS: 66 DEGREES
QRSD INTERVAL: 146 MS
QRSD INTERVAL: 66 MS
QRSD INTERVAL: 74 MS
QRSD INTERVAL: 74 MS
QT INTERVAL: 214 MS
QT INTERVAL: 346 MS
QT INTERVAL: 388 MS
QT INTERVAL: 410 MS
QTC INTERVAL: 340 MS
QTC INTERVAL: 433 MS
QTC INTERVAL: 455 MS
QTC INTERVAL: 580 MS
SODIUM SERPL-SCNC: 139 MMOL/L (ref 136–145)
T WAVE AXIS: 19 DEGREES
T WAVE AXIS: 200 DEGREES
T WAVE AXIS: 31 DEGREES
T WAVE AXIS: 33 DEGREES
VENTRICULAR RATE: 152 BPM
VENTRICULAR RATE: 169 BPM
VENTRICULAR RATE: 74 BPM
VENTRICULAR RATE: 75 BPM

## 2022-03-01 PROCEDURE — C9290 INJ, BUPIVACAINE LIPOSOME: HCPCS | Performed by: ANESTHESIOLOGY

## 2022-03-01 PROCEDURE — 82948 REAGENT STRIP/BLOOD GLUCOSE: CPT

## 2022-03-01 PROCEDURE — C1713 ANCHOR/SCREW BN/BN,TIS/BN: HCPCS | Performed by: ORTHOPAEDIC SURGERY

## 2022-03-01 PROCEDURE — 0RRK0JZ REPLACEMENT OF LEFT SHOULDER JOINT WITH SYNTHETIC SUBSTITUTE, OPEN APPROACH: ICD-10-PCS | Performed by: ORTHOPAEDIC SURGERY

## 2022-03-01 PROCEDURE — C1776 JOINT DEVICE (IMPLANTABLE): HCPCS | Performed by: ORTHOPAEDIC SURGERY

## 2022-03-01 PROCEDURE — 23472 RECONSTRUCT SHOULDER JOINT: CPT | Performed by: ORTHOPAEDIC SURGERY

## 2022-03-01 PROCEDURE — 23472 RECONSTRUCT SHOULDER JOINT: CPT | Performed by: PHYSICIAN ASSISTANT

## 2022-03-01 PROCEDURE — 99215 OFFICE O/P EST HI 40 MIN: CPT | Performed by: INTERNAL MEDICINE

## 2022-03-01 PROCEDURE — G9196 MED REASON FOR NO CEPH: HCPCS | Performed by: ORTHOPAEDIC SURGERY

## 2022-03-01 PROCEDURE — 73020 X-RAY EXAM OF SHOULDER: CPT

## 2022-03-01 PROCEDURE — 80053 COMPREHEN METABOLIC PANEL: CPT | Performed by: ANESTHESIOLOGY

## 2022-03-01 PROCEDURE — 93005 ELECTROCARDIOGRAM TRACING: CPT

## 2022-03-01 PROCEDURE — 93010 ELECTROCARDIOGRAM REPORT: CPT | Performed by: INTERNAL MEDICINE

## 2022-03-01 DEVICE — DELTA XTEND LATERALIZED GLENOSPHERE +2MM STANDARD 038MM
Type: IMPLANTABLE DEVICE | Site: SHOULDER | Status: FUNCTIONAL
Brand: DELTA XTEND

## 2022-03-01 DEVICE — DELTA XTEND STANDARD HUMERAL PE CUP DIA38 /+6MM STD
Type: IMPLANTABLE DEVICE | Site: SHOULDER | Status: FUNCTIONAL
Brand: DELTA XTEND

## 2022-03-01 DEVICE — DELTA XTEND MONOBLOC HUM CEMENTED EPIPHYSIS SZ1 /DIA10 STD
Type: IMPLANTABLE DEVICE | Site: SHOULDER | Status: FUNCTIONAL
Brand: DELTA XTEND

## 2022-03-01 DEVICE — CEMENT RESTRICTOR SIZE 2 10.75MM: Type: IMPLANTABLE DEVICE | Site: SHOULDER | Status: FUNCTIONAL

## 2022-03-01 DEVICE — DELTA XTEND CEMENTLESS METAGLENE HA
Type: IMPLANTABLE DEVICE | Site: SHOULDER | Status: FUNCTIONAL
Brand: DELTA XTEND

## 2022-03-01 DEVICE — PALACOS® R+G IS A FAST SETTING POLYMER CONTAINING GENTAMICIN, FOR USE IN BONE SURGERY. MIXING OF THE TWO COMPONENT SYSTEM, CONSISTING OF A POWDER AND A LIQUID, INITIALLY PRODUCES A LIQUID AND THEN A PASTE, WHICH IS USED TO ANCHOR THE PROSTHESIS TO THE BONE. THE HARDENED BONE CEMENT ALLOWS STABLE FIXATION OF THE PROSTHESIS AND TRANSFERS ALL STRESSES PRODUCED IN A MOVEMENT TO THE BONE VIA THE LARGE INTERFACE. INSOLUBLE ZIRCONIUM DIOXIDE IS INCLUDED IN THE CEMENT POWDER AS AN X RAY CONTRAST MEDIUM. THE CHLOROPHYLL ADDITIVE SERVES AS OPTICAL MARKING OF THE BONE CEMENT AT THE SITE OF THE OPERATION.
Type: IMPLANTABLE DEVICE | Site: SHOULDER | Status: FUNCTIONAL
Brand: PALACOS®

## 2022-03-01 DEVICE — DELTA XTEND NON LOCKING METAGLENE SCREW DIA 4.5 LG 18MM
Type: IMPLANTABLE DEVICE | Site: SHOULDER | Status: FUNCTIONAL
Brand: DELTA XTEND

## 2022-03-01 DEVICE — DELTA XTEND LOCKING METAGLENE SCREW DIA 4.5 LG 24MM
Type: IMPLANTABLE DEVICE | Site: SHOULDER | Status: FUNCTIONAL
Brand: DELTA XTEND

## 2022-03-01 RX ORDER — NEOSTIGMINE METHYLSULFATE 1 MG/ML
INJECTION INTRAVENOUS AS NEEDED
Status: DISCONTINUED | OUTPATIENT
Start: 2022-03-01 | End: 2022-03-01

## 2022-03-01 RX ORDER — FENTANYL CITRATE 50 UG/ML
INJECTION, SOLUTION INTRAMUSCULAR; INTRAVENOUS AS NEEDED
Status: DISCONTINUED | OUTPATIENT
Start: 2022-03-01 | End: 2022-03-01

## 2022-03-01 RX ORDER — MAGNESIUM HYDROXIDE 1200 MG/15ML
LIQUID ORAL AS NEEDED
Status: DISCONTINUED | OUTPATIENT
Start: 2022-03-01 | End: 2022-03-01 | Stop reason: HOSPADM

## 2022-03-01 RX ORDER — ONDANSETRON 2 MG/ML
4 INJECTION INTRAMUSCULAR; INTRAVENOUS EVERY 6 HOURS PRN
Status: DISCONTINUED | OUTPATIENT
Start: 2022-03-01 | End: 2022-03-07 | Stop reason: HOSPADM

## 2022-03-01 RX ORDER — PRAVASTATIN SODIUM 80 MG/1
80 TABLET ORAL
Status: DISCONTINUED | OUTPATIENT
Start: 2022-03-02 | End: 2022-03-07 | Stop reason: HOSPADM

## 2022-03-01 RX ORDER — DOCUSATE SODIUM 100 MG/1
100 CAPSULE, LIQUID FILLED ORAL 2 TIMES DAILY
Status: DISCONTINUED | OUTPATIENT
Start: 2022-03-01 | End: 2022-03-07 | Stop reason: HOSPADM

## 2022-03-01 RX ORDER — ONDANSETRON 2 MG/ML
4 INJECTION INTRAMUSCULAR; INTRAVENOUS ONCE AS NEEDED
Status: COMPLETED | OUTPATIENT
Start: 2022-03-01 | End: 2022-03-01

## 2022-03-01 RX ORDER — MIDAZOLAM HYDROCHLORIDE 2 MG/2ML
INJECTION, SOLUTION INTRAMUSCULAR; INTRAVENOUS AS NEEDED
Status: DISCONTINUED | OUTPATIENT
Start: 2022-03-01 | End: 2022-03-01

## 2022-03-01 RX ORDER — NYSTATIN 100000 [USP'U]/G
POWDER TOPICAL 2 TIMES DAILY
Status: DISCONTINUED | OUTPATIENT
Start: 2022-03-01 | End: 2022-03-07 | Stop reason: HOSPADM

## 2022-03-01 RX ORDER — PROPOFOL 10 MG/ML
INJECTION, EMULSION INTRAVENOUS AS NEEDED
Status: DISCONTINUED | OUTPATIENT
Start: 2022-03-01 | End: 2022-03-01

## 2022-03-01 RX ORDER — EPHEDRINE SULFATE 50 MG/ML
INJECTION INTRAVENOUS AS NEEDED
Status: DISCONTINUED | OUTPATIENT
Start: 2022-03-01 | End: 2022-03-01

## 2022-03-01 RX ORDER — PREGABALIN 100 MG/1
100 CAPSULE ORAL 2 TIMES DAILY
Status: DISCONTINUED | OUTPATIENT
Start: 2022-03-01 | End: 2022-03-07 | Stop reason: HOSPADM

## 2022-03-01 RX ORDER — GLYCOPYRROLATE 0.2 MG/ML
INJECTION INTRAMUSCULAR; INTRAVENOUS AS NEEDED
Status: DISCONTINUED | OUTPATIENT
Start: 2022-03-01 | End: 2022-03-01

## 2022-03-01 RX ORDER — HYDROMORPHONE HCL/PF 1 MG/ML
0.5 SYRINGE (ML) INJECTION
Status: DISCONTINUED | OUTPATIENT
Start: 2022-03-01 | End: 2022-03-01 | Stop reason: HOSPADM

## 2022-03-01 RX ORDER — VANCOMYCIN HYDROCHLORIDE 1 G/200ML
INJECTION, SOLUTION INTRAVENOUS AS NEEDED
Status: DISCONTINUED | OUTPATIENT
Start: 2022-03-01 | End: 2022-03-01

## 2022-03-01 RX ORDER — METOPROLOL TARTRATE 50 MG/1
50 TABLET, FILM COATED ORAL EVERY 12 HOURS SCHEDULED
Status: DISCONTINUED | OUTPATIENT
Start: 2022-03-01 | End: 2022-03-07 | Stop reason: HOSPADM

## 2022-03-01 RX ORDER — LIDOCAINE HYDROCHLORIDE 10 MG/ML
INJECTION, SOLUTION EPIDURAL; INFILTRATION; INTRACAUDAL; PERINEURAL AS NEEDED
Status: DISCONTINUED | OUTPATIENT
Start: 2022-03-01 | End: 2022-03-01

## 2022-03-01 RX ORDER — AMLODIPINE BESYLATE 5 MG/1
5 TABLET ORAL DAILY
Status: DISCONTINUED | OUTPATIENT
Start: 2022-03-02 | End: 2022-03-04

## 2022-03-01 RX ORDER — SCOLOPAMINE TRANSDERMAL SYSTEM 1 MG/1
PATCH, EXTENDED RELEASE TRANSDERMAL AS NEEDED
Status: DISCONTINUED | OUTPATIENT
Start: 2022-03-01 | End: 2022-03-01

## 2022-03-01 RX ORDER — MONTELUKAST SODIUM 10 MG/1
10 TABLET ORAL
Status: DISCONTINUED | OUTPATIENT
Start: 2022-03-02 | End: 2022-03-07 | Stop reason: HOSPADM

## 2022-03-01 RX ORDER — ACETAMINOPHEN 325 MG/1
650 TABLET ORAL EVERY 6 HOURS PRN
Status: DISCONTINUED | OUTPATIENT
Start: 2022-03-01 | End: 2022-03-07 | Stop reason: HOSPADM

## 2022-03-01 RX ORDER — SODIUM CHLORIDE, SODIUM LACTATE, POTASSIUM CHLORIDE, CALCIUM CHLORIDE 600; 310; 30; 20 MG/100ML; MG/100ML; MG/100ML; MG/100ML
75 INJECTION, SOLUTION INTRAVENOUS CONTINUOUS
Status: DISCONTINUED | OUTPATIENT
Start: 2022-03-01 | End: 2022-03-02

## 2022-03-01 RX ORDER — PROMETHAZINE HYDROCHLORIDE 25 MG/ML
12.5 INJECTION, SOLUTION INTRAMUSCULAR; INTRAVENOUS ONCE AS NEEDED
Status: DISCONTINUED | OUTPATIENT
Start: 2022-03-01 | End: 2022-03-01 | Stop reason: HOSPADM

## 2022-03-01 RX ORDER — FENTANYL CITRATE/PF 50 MCG/ML
50 SYRINGE (ML) INJECTION
Status: DISCONTINUED | OUTPATIENT
Start: 2022-03-01 | End: 2022-03-01 | Stop reason: HOSPADM

## 2022-03-01 RX ORDER — SODIUM CHLORIDE 9 MG/ML
INJECTION, SOLUTION INTRAVENOUS CONTINUOUS PRN
Status: DISCONTINUED | OUTPATIENT
Start: 2022-03-01 | End: 2022-03-01

## 2022-03-01 RX ORDER — LORATADINE 10 MG/1
10 TABLET ORAL DAILY
Status: DISCONTINUED | OUTPATIENT
Start: 2022-03-02 | End: 2022-03-07 | Stop reason: HOSPADM

## 2022-03-01 RX ORDER — SODIUM CHLORIDE, SODIUM LACTATE, POTASSIUM CHLORIDE, CALCIUM CHLORIDE 600; 310; 30; 20 MG/100ML; MG/100ML; MG/100ML; MG/100ML
50 INJECTION, SOLUTION INTRAVENOUS CONTINUOUS
Status: DISCONTINUED | OUTPATIENT
Start: 2022-03-01 | End: 2022-03-02

## 2022-03-01 RX ORDER — ROCURONIUM BROMIDE 10 MG/ML
INJECTION, SOLUTION INTRAVENOUS AS NEEDED
Status: DISCONTINUED | OUTPATIENT
Start: 2022-03-01 | End: 2022-03-01

## 2022-03-01 RX ORDER — DEXTROSE MONOHYDRATE 25 G/50ML
INJECTION, SOLUTION INTRAVENOUS AS NEEDED
Status: DISCONTINUED | OUTPATIENT
Start: 2022-03-01 | End: 2022-03-01

## 2022-03-01 RX ORDER — BUPIVACAINE HYDROCHLORIDE 5 MG/ML
INJECTION, SOLUTION PERINEURAL
Status: COMPLETED | OUTPATIENT
Start: 2022-03-01 | End: 2022-03-01

## 2022-03-01 RX ORDER — HYDROCODONE BITARTRATE AND ACETAMINOPHEN 5; 325 MG/1; MG/1
1 TABLET ORAL EVERY 4 HOURS PRN
Status: DISCONTINUED | OUTPATIENT
Start: 2022-03-01 | End: 2022-03-07 | Stop reason: HOSPADM

## 2022-03-01 RX ORDER — VANCOMYCIN HYDROCHLORIDE 1 G/200ML
1000 INJECTION, SOLUTION INTRAVENOUS EVERY 12 HOURS
Status: COMPLETED | OUTPATIENT
Start: 2022-03-02 | End: 2022-03-02

## 2022-03-01 RX ORDER — ALBUTEROL SULFATE 90 UG/1
2 AEROSOL, METERED RESPIRATORY (INHALATION) EVERY 6 HOURS PRN
Status: DISCONTINUED | OUTPATIENT
Start: 2022-03-01 | End: 2022-03-07 | Stop reason: HOSPADM

## 2022-03-01 RX ADMIN — SCOPALAMINE 1 PATCH: 1 PATCH, EXTENDED RELEASE TRANSDERMAL at 15:24

## 2022-03-01 RX ADMIN — SODIUM CHLORIDE: 0.9 INJECTION, SOLUTION INTRAVENOUS at 17:00

## 2022-03-01 RX ADMIN — MIDAZOLAM 1 MG: 1 INJECTION INTRAMUSCULAR; INTRAVENOUS at 14:59

## 2022-03-01 RX ADMIN — EPHEDRINE SULFATE 10 MG: 50 INJECTION, SOLUTION INTRAVENOUS at 15:51

## 2022-03-01 RX ADMIN — SODIUM CHLORIDE, SODIUM LACTATE, POTASSIUM CHLORIDE, AND CALCIUM CHLORIDE 50 ML/HR: .6; .31; .03; .02 INJECTION, SOLUTION INTRAVENOUS at 20:24

## 2022-03-01 RX ADMIN — MIDAZOLAM 1 MG: 1 INJECTION INTRAMUSCULAR; INTRAVENOUS at 15:31

## 2022-03-01 RX ADMIN — SODIUM CHLORIDE, SODIUM LACTATE, POTASSIUM CHLORIDE, AND CALCIUM CHLORIDE 75 ML/HR: .6; .31; .03; .02 INJECTION, SOLUTION INTRAVENOUS at 12:35

## 2022-03-01 RX ADMIN — METOPROLOL TARTRATE 50 MG: 50 TABLET, FILM COATED ORAL at 20:28

## 2022-03-01 RX ADMIN — NEOSTIGMINE METHYLSULFATE 3 MG: 1 INJECTION INTRAVENOUS at 17:44

## 2022-03-01 RX ADMIN — ONDANSETRON 4 MG: 2 INJECTION INTRAMUSCULAR; INTRAVENOUS at 18:48

## 2022-03-01 RX ADMIN — DOCUSATE SODIUM 100 MG: 100 CAPSULE ORAL at 20:28

## 2022-03-01 RX ADMIN — VANCOMYCIN HYDROCHLORIDE 1250 MG: 1 INJECTION, SOLUTION INTRAVENOUS at 15:25

## 2022-03-01 RX ADMIN — FENTANYL CITRATE 25 MCG: 50 INJECTION, SOLUTION INTRAMUSCULAR; INTRAVENOUS at 14:59

## 2022-03-01 RX ADMIN — GLYCOPYRROLATE 0.4 MG: 0.2 INJECTION, SOLUTION INTRAMUSCULAR; INTRAVENOUS at 17:44

## 2022-03-01 RX ADMIN — BUPIVACAINE 20 ML: 13.3 INJECTION, SUSPENSION, LIPOSOMAL INFILTRATION at 15:08

## 2022-03-01 RX ADMIN — ROCURONIUM BROMIDE 50 MG: 10 INJECTION, SOLUTION INTRAVENOUS at 15:31

## 2022-03-01 RX ADMIN — PREGABALIN 100 MG: 100 CAPSULE ORAL at 20:28

## 2022-03-01 RX ADMIN — BUPIVACAINE HYDROCHLORIDE 5 ML: 5 INJECTION, SOLUTION PERINEURAL at 15:11

## 2022-03-01 RX ADMIN — FENTANYL CITRATE 25 MCG: 50 INJECTION, SOLUTION INTRAMUSCULAR; INTRAVENOUS at 15:31

## 2022-03-01 RX ADMIN — NYSTATIN: 100000 POWDER TOPICAL at 21:25

## 2022-03-01 RX ADMIN — EPHEDRINE SULFATE 10 MG: 50 INJECTION, SOLUTION INTRAVENOUS at 16:39

## 2022-03-01 RX ADMIN — PHENYLEPHRINE HYDROCHLORIDE 50 MCG/MIN: 10 INJECTION INTRAVENOUS at 15:50

## 2022-03-01 RX ADMIN — DEXTROSE MONOHYDRATE 25 G: 25 INJECTION, SOLUTION INTRAVENOUS at 15:50

## 2022-03-01 RX ADMIN — PROPOFOL 200 MG: 10 INJECTION, EMULSION INTRAVENOUS at 15:31

## 2022-03-01 RX ADMIN — LIDOCAINE HYDROCHLORIDE 50 MG: 10 INJECTION, SOLUTION EPIDURAL; INFILTRATION; INTRACAUDAL; PERINEURAL at 15:31

## 2022-03-01 NOTE — ANESTHESIA PROCEDURE NOTES
Peripheral Block    Start time: 3/1/2022 3:08 PM  Reason for block: procedure for pain, at surgeon's request and post-op pain management  Staffing  Anesthesiologist: Omar Fu MD  Other anesthesia staff: Lulú Rey MD  Preanesthetic Checklist  Completed: patient identified, IV checked, site marked, risks and benefits discussed, surgical consent, monitors and equipment checked, pre-op evaluation and timeout performed  Peripheral Block  Patient position: supine  Prep: ChloraPrep  Patient monitoring: continuous pulse ox and heart rate  Block type: interscalene  Laterality: left  Injection technique: single-shot  Procedures: ultrasound guided, Ultrasound guidance required for the procedure to increase accuracy and safety of medication placement and decrease risk of complications  Ultrasound permanent image savedbupivacaine (MARCAINE) 0 5 % perineural infiltration, 5 mL (with 20 ml of exparel)  Needle  Needle type: Stimuplex   Needle gauge: 22 G  Needle length: 10 cm  Needle localization: ultrasound guidance  Assessment  Injection assessment: local visualized surrounding nerve on ultrasound, incremental injection, negative aspiration for heme, negative aspiration for CSF and no paresthesia on injection  Paresthesia pain: none  Heart rate change: no  Slow fractionated injection: yes  Post-procedure:  site cleaned  patient tolerated the procedure well with no immediate complications  Additional Notes  Plexus was poorly visible, neurostim used to confirm location of plexus with setting at 0 6  Twitch in deltoid muscle easily appreciated    Twitch stopped after administration of local

## 2022-03-01 NOTE — OP NOTE
OPERATIVE REPORT  PATIENT NAME: Aneesh Tate    :  1950  MRN: [id number]  Pt Location: [facility] OR ROOM 02    SURGERY DATE: 3/1/2022    Surgeon(s) and Role:     * Jose Santillan MD - Primary     * Nessa Cramer PA-C - Assisting necessary for the procedure for assistance with retraction of vital structures well as assistance with arthroplasty techniques for this 4 part displaced proximal humerus fracture     * Maddy Priest PA-C - [address] [place] [address] A  [facility] 3rd assistant    Preop Diagnosis:  Closed fracture of head of left humerus, initial encounter [S42 292A] 4 part displaced proximal humerus fracture    Post-Op Diagnosis Codes:     * Closed fracture of head of left humerus, initial encounter [S42 292A] 4 part displaced proximal humerus fracture    Procedure(s) (LRB):  ARTHROPLASTY SHOULDER REVERSE (Left) utilizing Depuy Delta extend cement less metaglene hydroxyapatite coated standard as well as 2 locking screws and 1 nonlocking screw and the humeral polyethylene cup standard 38/6 and the mono block humeral epiphysis size 1 standard by 10 cemented with a cement restrictor size 2 x 10 75 mm and a lateralized glenosphere +2 mm x 38 mm standard    Specimen(s):  * No specimens in log *    Estimated Blood Loss:   100 mL    Drains:  * No LDAs found *    Anesthesia Type:   General w/ Regional    Operative Indications:  Closed fracture of head of left humerus, initial encounter Fredy Benavides is a 42-year-old female who unfortunately suffered a left proximal humerus 4 part displaced fracture over week ago  She was on anticoagulation and thus we did have to stop that prior to her operation and she also required medical workup for preoperatively  She understood the risks and benefits of a left reverse total shoulder arthroplasty as she was unable to move her shoulder and had significant displacement of this fracture pattern  She wished to go ahead with this procedure    The risks are inclusive of but not limited to infection, stiffness, nerve or blood vessel injury causing numbness pain and weakness, dislocation, fracture, failure of the operation provide anticipated results, failure to regain strength and ability, and need for further surgery  Operative Findings:  Left shoulder examination demonstrated a significantly displaced proximal humerus fracture with a 4 part proximal humerus fracture demonstrated  We were able to repair the tuberosities back to bone and soft tissue at the end of the procedure but the humeral head was significantly displaced and comparison to the other portions of the proximal humerus albeit located in the glenoid  We did have a stable glenohumeral joint at the end of the procedure and achieved forward flexion of 150° with abduction 140° external rotation was easily to 90° and a internal rotation was to 80°  We had good stability we felt and the conjoined tendon as well as the deltoid appeared to have good tension  Complications:   None    Procedure and Technique:  Anmol Royal was taken to the operating room and placed supine on the OR table  She was given preoperative IV antibiotics was preoperative regional anesthesia by attending anesthesiologist   General anesthesia was induced and she was brought comfortably and safely into the semi beach chair position with all parts well padded and the head neutral   The left upper extremity was prepped and draped in usual sterile fashion  Surgical time-out was taken  We then utilized her previous left shoulder incision and extended that distally  This was a curvilinear incision for her previous rotator cuff repair surgery  We did utilize a 15 blade through skin and then carefully dissected down to the deltopectoral interval and took the cephalic vein laterally and protected that throughout the procedure  We were then able to dissect the conjoined tendon and retracted that medially    We did come down upon a significantly displaced proximal humerus fracture with the humeral shaft displaced anteriorly and an obvious 4 part proximal humerus fracture demonstrated with both tuberosities off and the humeral head located in the glenohumeral vault  We did remove the humeral head utilizing a Zach  We then isolated the tuberosities and placed interrupted horizontal mattress Ethibond sutures into the tuberosities for later repair back to the proximal humerus  We were then able to expose the glenoid and did remove the glenoid labrum circumferentially and the long head of the biceps tendon which was torn  We had excellent exposure of the glenoid  We then utilized the glenoid aiming guide and placed a pin in the center portion of the glenoid slightly inferior to the midline  This was a fairly small glenoid vault  We were then able to utilize the power Reamer followed by the eccentric Reamer and then utilized the drill for the center peg  We then irrigated and placed the metaglene which was a standard metaglene and impacted that very nicely  This was quite stable  We then placed a locking screw proximally as well as a locking screw along the inferior aspect of the metaglene and then a nonlocking screw along the posterior aspect  All had excellent purchase  We then chose a +2 by 38 mm standard lateralized glenosphere and engaged that and impacted it and engaged further giving excellent fixation  We then turned our attention to preparation of the proximal humerus and hand reamed up to a size 10  We knew we were going to cement the prosthesis  We did place a trial prosthesis that is set at approximately 10° of retroversion  We did trial +3 followed by a +6 and felt that the +6 was a reasonable fit  We did have some medial calcar remaining on the proximal humerus to test tight  We then removed all trial components and irrigated thoroughly    We then measured for and placed a cement restrictor distally which is a size 2 x 10 75 mm restrictor  We then utilized antibiotic cement and cemented in place in approximately 10° of retroversion the mono block humeral stem  Once this was nicely hardened, we were then able to place a trial +3 followed by a trial +6 humeral polyethylene cup standard  We felt that the +6 was more appropriate with tension along the deltoid and conjoint tendon and that was nicely stable in the range of motion listed above  We then removed the trial polyethylene cup and irrigated thoroughly and impacted the real polyethylene cup with a +6 x 38  We then irrigated thoroughly  We located the glenohumeral joint and once again felt that we were nicely stable with excellent range of motion as listed above  We had good tension along the conjoined tendon as well as along the deltoid  We then further irrigated and repaired the tuberosities back to the proximal humerus utilizing the Ethibond suture through bone and adjacent soft tissue  We were able to repair the greater and lesser tuberosity  We were pleased with our fixation and our stability at this point  We then irrigated further and closed the deltopectoral interval utilizing 0 Vicryl suture followed by 2-0 Vicryl and 4-0 Vicryl and skin glue  Dry, sterile dressings were applied with a sling  She tolerated the procedure well and transferred to recovery room stable condition  She will be admitted overnight for observation and will be discharged home in the morning  She will wear sling for 6 weeks postoperatively  We will hold off on any range of motion at the shoulder to be certain that we can achieve good stability as reverse total shoulder arthroplasty for fracture is known to have a higher incidence of shoulder instability postoperatively  We will have a postoperative x-ray in the recovery room     I was present for the entire procedure and A qualified resident physician was not available    Patient Disposition:  PACU       SIGNATURE: Scottie Guerrero Meche Mcgowan MD  DATE: March 1, 2022  TIME: 6:02 PM

## 2022-03-01 NOTE — ANESTHESIA POSTPROCEDURE EVALUATION
Post-Op Assessment Note    CV Status:  Stable  Pain Score: 0    Pain management: adequate     Mental Status:  Awake   Hydration Status:  Stable   PONV Controlled:  None   Airway Patency:  Patent      Post Op Vitals Reviewed: Yes      Staff: Anesthesiologist         No complications documented      BP   135/77   Temp   97 2   Pulse  83   Resp   25   SpO2   99%

## 2022-03-01 NOTE — INTERVAL H&P NOTE
H&P reviewed  After examining the patient I find no changes in the patients condition since the H&P had been written      Vitals:    03/01/22 1208   BP: 146/66   Pulse: 70   Resp: 18   Temp: 97 6 °F (36 4 °C)   SpO2: 98%

## 2022-03-01 NOTE — CONSULTS
Consultation - Cardiology   [facility]     Brookie Kawasaki 73 y o  female MRN: [id number]  : 1950  Unit/Bed#: OR POOL Encounter: [id number]      Assessment & Plan     1  Preoperative cardiovascular examination  Patient's stress test from 2019 and echo from 2019 reviewed  Clinically the he has no evidence of any volume overload  She is comfortably lying flat  I believe she is in optimal condition for the procedure as planned  And she is low risk for the surgery  2  Paroxysmal atrial fibrillation currently in sinus  EKG reviewed continue beta-blockers  3  Obesity with history of sleep apnea  Management as per Anesthesiology    4  Essential hypertension  Blood pressure is acceptable continue amlodipine and beta-blocker    5  Mild hyperkalemia with normal kidney function  Her potassium is 5 7  Encouraged her to have low potassium diet  Continue IV fluids  May need repeat labs after surgery  No EKG changes noted  6  Close fracture of head of left humerus  Patient is scheduled for shoulder surgery    7  Diabetes mellitus  Monitor blood sugars    8  Dyslipidemia on statins  Continue statins    Discussed with Anesthesiology  Thank you for your consultation  Physician Requesting Consult: Basil Ledbetterr*    Reason for Consult / Principal Problem:  Preoperative cardiovascular examination    Inpatient consult to Cardiology  Consult performed by: Shy Love MD  Consult ordered by: Opal Carolina MD          HPI: Brookie Kawasaki is a 73y o  year old female who presents for shoulder surgery  She has medical history significant for paroxysmal atrial fibrillation, CKD stage 3, diabetes mellitus, lymphedema, abnormal sleep screen, who had a mechanical fall and now needs shoulder surgery  Her EKG shows lot of baseline artifact hence was asked by the anesthesiology service to re-evaluate    Patient was examined and history was taken from patient and patient's daughter was present bedside  She denies any chest pain or any shortness of breath  Before the fall she was able to do her activities without any problem  She does have history of paroxysmal atrial fibrillation but currently she is in sinus with PACs and her Patricia Vasc score is high though she is holding her Eliquis  She had history of chronic diastolic heart failure and she is on torsemide at this time she is comfortably lying flat and she is euvolemic  Review of Systems   Constitutional: Negative for activity change, chills, diaphoresis, fever and unexpected weight change  HENT: Negative for congestion  Eyes: Negative for discharge and redness  Respiratory: Negative for cough, chest tightness, shortness of breath and wheezing  Cardiovascular: Negative  Negative for chest pain, palpitations and leg swelling  Gastrointestinal: Negative for abdominal pain, diarrhea and nausea  Endocrine: Negative  Genitourinary: Negative for decreased urine volume and urgency  Musculoskeletal: Negative  Negative for arthralgias, back pain and gait problem  Shoulder pain   Skin: Negative for rash and wound  Allergic/Immunologic: Negative  Neurological: Negative for dizziness, seizures, syncope, weakness, light-headedness and headaches  Hematological: Negative  Psychiatric/Behavioral: Negative for agitation and confusion  The patient is not nervous/anxious          Historical Information   Past Medical History:   Diagnosis Date    [facility]     Anemia     Asthma     Chronic kidney disease     CKD (chronic kidney disease)     Diabetes mellitus (HCC)     GERD (gastroesophageal reflux disease)     Hyperlipidemia     Hypertension     Kidney stones     PONV (postoperative nausea and vomiting)     SVT (supraventricular tachycardia) ([facility])      Past Surgical History:   Procedure Laterality Date    APPENDECTOMY      CYSTOSCOPY W/ LASER LITHOTRIPSY Left 7/12/2016    Procedure: CYSTOSCOPY URETEROSCOPY WITH LITHOTRIPSY HOLMIUM LASER, RETROGRADE PYELOGRAM AND INSERTION STENT URETERAL;  Surgeon: Rachell Dougherty MD;  Location: [facility] MAIN [facility];  Service:     DILATION AND CURETTAGE OF UTERUS      JOINT REPLACEMENT      right knee    KNEE ARTHROPLASTY Right     AL CYSTOURETHROSCOPY,URETER CATHETER Left 2016    Procedure: CYSTOSCOPY RETROGRADE PYELOGRAM WITH INSERTION STENT URETERAL, left;  Surgeon: Rachell Dougherty MD;  Location: [facility];  Service: Urology    SHOULDER ARTHROTOMY Left      Social History     Substance and Sexual Activity   Alcohol Use Not Currently    Comment: rarely     Social History     Substance and Sexual Activity   Drug Use No     Social History     Tobacco Use   Smoking Status Former Smoker    Packs/day: 1 00    Years: 20 00    Pack years: 20     Types: Cigarettes    Quit date: 1996    Years since quittin 6   Smokeless Tobacco Never Used     Family History:   Family History   Problem Relation Age of Onset    Heart disease Mother     Emphysema Maternal Grandmother     Heart disease Family        Meds/Allergies    PTA meds:    Medications Prior to Admission   Medication    acetaminophen (TYLENOL) 325 mg tablet    amLODIPine (NORVASC) 5 mg tablet    ammonium lactate (LAC-HYDRIN) 12 % lotion    apixaban (ELIQUIS) 5 mg    desloratadine (CLARINEX) 5 MG tablet    Insulin Glargine (TOUJEO SOLOSTAR) injection pen 300 units/mL    insulin lispro (HUMALOG) 100 units/mL injection    lidocaine (LIDODERM) 5 %    metoprolol tartrate (LOPRESSOR) 25 mg tablet    montelukast (SINGULAIR) 10 mg tablet    NOVOFINE AUTOCOVER 30G X 8 MM MISC    nystatin (MYCOSTATIN) powder    pregabalin (LYRICA) 100 mg capsule    rosuvastatin (CRESTOR) 10 MG tablet    Trulicity 1 5 UC/2 0PK SOPN    albuterol (PROVENTIL HFA,VENTOLIN HFA) 90 mcg/act inhaler    ondansetron (Zofran ODT) 4 mg disintegrating tablet    traMADol (ULTRAM) 50 mg tablet      Allergies Allergen Reactions    Penicillins Hives    Moxifloxacin Other (See Comments)     unknown    Percocet [Oxycodone-Acetaminophen] Other (See Comments)     unknown    Zinc Acetate Other (See Comments)     unknown    Nuts - Food Allergy Other (See Comments)    Asa [Aspirin] GI Intolerance    Indocin [Indomethacin] Other (See Comments)     Made patient "loopy"    Other Other (See Comments)     unknown       Current Facility-Administered Medications:     bupivacaine liposomal (EXPAREL) 1 3 % injection 20 mL, 20 mL, Infiltration, Once, Glennis Kehr, MD    lactated ringers infusion, 75 mL/hr, Intravenous, Continuous, Glennis Kehr, MD, Last Rate: 75 mL/hr at 03/01/22 1235, 75 mL/hr at 03/01/22 1235        Objective:   Vitals: Blood pressure 146/66, pulse 70, temperature 97 6 °F (36 4 °C), temperature source Temporal, resp  rate 18, weight 122 kg (268 lb), SpO2 98 %, not currently breastfeeding  Body mass index is 49 02 kg/m²    Wt Readings from Last 3 Encounters:   03/01/22 122 kg (268 lb)   02/17/22 122 kg (268 lb)   12/28/21 120 kg (265 lb)     BP Readings from Last 3 Encounters:   03/01/22 146/66   02/24/22 122/74   02/23/22 121/69     Orthostatic Blood Pressures      Most Recent Value   Blood Pressure 146/66 filed at 03/01/2022 1208        No intake or output data in the 24 hours ending 03/01/22 1446    Invasive Devices  Report    Peripheral Intravenous Line            Peripheral IV 03/01/22 Right Wrist <1 day                  Physical Exam:   Physical Exam    Neurologic:  Alert & oriented x 3, no new focal deficits, Not in any acute distress,  Constitutional:  Adequate built, non-toxic appearance   Neck: Normal range of motion, no tenderness,  Neck supple   Respiratory:  Bilateral air entry, mostly clear to auscultation  Cardiovascular: S1-S2 regular with a 2/6 ejection systolic murmur and S4 is present  GI:  Soft, nondistended,  nontender, no hepatosplenomegaly appreciated  Musculoskeletal:  No edema, no tenderness, no deformities  Skin:  Well hydrated, no rash   Extremities:  Mild chronic lower extremity edema  Psychiatric:  Speech and behavior appropriate     Labs:     CMP:   Results from last 7 days   Lab Units 22  1246   SODIUM mmol/L 139   POTASSIUM mmol/L 5 7*   CHLORIDE mmol/L 107   CO2 mmol/L 28   ANION GAP mmol/L 4   BUN mg/dL 24   CREATININE mg/dL 1 03   GLUCOSE FASTING mg/dL 74   CALCIUM mg/dL 8 4   AST U/L 23   ALT U/L 23   ALK PHOS U/L 69   TOTAL PROTEIN g/dL 6 4   ALBUMIN g/dL 2 8*   TOTAL BILIRUBIN mg/dL 0 77   EGFR ml/min/1 73sq m 55   GLUCOSE RANDOM mg/dL 74       Imaging & Testing   Cardiac testing:   Results for orders placed during the hospital encounter of 19    Echo complete with contrast if indicated    Todd [address]lamas [address]  [address] [facility] [place] [address]  [phone]    Transthoracic Echocardiogram  2D, M-mode, Doppler, and Color Doppler    Study date:  29-May-2019    Patient: Fermin Iverson  MR number: [id number]  Account number: [de-identified]  : 1950  Age: 79 years  Gender: Female  Status: Inpatient  Location: Bedside  Height: 62 in  Weight: 250 6 lb  BP: 133/ 62 mmHg    Indications: Tachycardia    Diagnoses: I11 9 - Hypertensive heart disease without heart failure    Sonographer:  QUETA Sidhu  Referring Physician:  Davy Carrillo MD  Group:  [facility]  Interpreting Physician:  Divine Lopez DO    SUMMARY    LEFT VENTRICLE:  Systolic function was normal by visual assessment  Ejection fraction was estimated to be 55 %  There were no regional wall motion abnormalities  Wall thickness was mildly to moderately increased  Doppler parameters were consistent with abnormal left ventricular relaxation (grade 1 diastolic dysfunction)  MITRAL VALVE:  There was mild regurgitation  AORTIC VALVE:  There was no evidence for stenosis    There was no regurgitation  TRICUSPID VALVE:  There was mild regurgitation  Pulmonary artery systolic pressure was within the normal range  HISTORY: PRIOR HISTORY: Diabetes,Diabetes, HTN, Hyperlipidemia, A Fib  PROCEDURE: The procedure was performed at the bedside  This was a routine study  The transthoracic approach was used  The study included complete 2D imaging, M-mode, complete spectral Doppler, and color Doppler  The heart rate was 77 bpm,  at the start of the study  Images were obtained from the parasternal, apical, subcostal, and suprasternal notch acoustic windows  Echocardiographic views were limited due to restricted patient mobility, poor patient compliance, poor  acoustic window availability, decreased penetration, and lung interference  This was a technically difficult study  LEFT VENTRICLE: Size was normal  Systolic function was normal by visual assessment  Ejection fraction was estimated to be 55 %  There were no regional wall motion abnormalities  Wall thickness was mildly to moderately increased  DOPPLER:  Doppler parameters were consistent with abnormal left ventricular relaxation (grade 1 diastolic dysfunction)  RIGHT VENTRICLE: The size was normal  Systolic function was normal     LEFT ATRIUM: The atrium was mildly dilated  RIGHT ATRIUM: Size was normal     MITRAL VALVE: Valve structure was normal  There was normal leaflet separation  No echocardiographic evidence for prolapse  DOPPLER: The transmitral velocity was within the normal range  There was no evidence for stenosis  There was mild  regurgitation  AORTIC VALVE: The valve was trileaflet  Leaflets exhibited normal thickness, normal cuspal separation, and sclerosis  DOPPLER: Transaortic velocity was within the normal range  There was no evidence for stenosis  There was no  regurgitation  TRICUSPID VALVE: The valve structure was normal  There was normal leaflet separation   DOPPLER: The transtricuspid velocity was within the normal range  There was mild regurgitation  Pulmonary artery systolic pressure was within the normal  range  Estimated peak PA pressure was 32 mmHg  PULMONIC VALVE: Leaflets exhibited normal thickness, no calcification, and normal cuspal separation  DOPPLER: The transpulmonic velocity was within the normal range  There was no regurgitation  PERICARDIUM: There was no thickening  There was no pericardial effusion  AORTA: The root exhibited normal size  PULMONARY ARTERY: The size was normal  The morphology appeared normal     SYSTEM MEASUREMENT TABLES    2D mode  AoR Diam 2D: 3 6 cm  LA Diam (2D): 3 9 cm  LA/Ao (2D): 1 08  FS (2D Teich): 26 9 %  IVSd (2D): 1 29 cm  LVDEV: 75 1 cmï¾³  LVESV: 35 3 cmï¾³  LVIDd(2D): 4 12 cm  LVISd (2D): 3 01 cm  LVOT Area 2D: 3 14 cmï¾²  LVPWd (2D): 1 2 cm  SV (Teich): 39 8 cmï¾³    Apical four chamber  LVEF A4C: 51 %    Unspecified Scan Mode  HANNA Cont Eq (Peak Gutierrez): 2 58 cmï¾²  LVOT Diam : 2 cm  LVOT Vmax: 963 mm/s  LVOT Vmax; Mean: 963 mm/s  Peak Grad ; Mean: 4 mm[Hg]  MV Peak A Gutierrez: 893 mm/s  MV Peak E Gutierrez  Mean: 805 mm/s  MVA (PHT): 3 67 cmï¾²  PHT: 60 ms  Max P mm[Hg]  V Max: 2360 mm/s  Vmax: 2430 mm/s  RA Area: 12 8 cmï¾²  RA Volume: 27 6 cmï¾³  TAPSE: 2 cm    [employer] Accredited Echocardi[facility]    Prepared and electronically signed by    Kirk Church DO  Signed 29-May-2019 16:19:07    No results found for this or any previous visit  No results found for this or any previous visit  No results found for this or any previous visit  No results found for this or any previous visit  No results found for this or any previous visit  Imaging: I have personally reviewed pertinent reports  XR scapula LEFT    Result Date: 2022  Narrative: LEFT SCAPULA INDICATION:   fall, left shoulder pain   COMPARISON:  Left shoulder x-rays same date VIEWS:  XR SCAPULA LEFT FINDINGS: Comminuted fracture of the left proximal humerus including the neck and head noted with head remaining articulated with the scapula, neck displaced from the head  No lytic or blastic osseous lesion  Visualized left hemithorax appears intact  Impression: Displaced comminuted left proximal humeral fracture  Findings concur with the referring clinician's preliminary interpretation already in the patient's electronic health record  Workstation performed: [id number]     XR shoulder 2+ vw left    Result Date: 2/25/2022  Narrative: LEFT SHOULDER INDICATION:   S42 292A: Other displaced fracture of upper end of left humerus, initial encounter for closed fracture  COMPARISON:  02/16/2022 VIEWS:  XR SHOULDER 2+ VW LEFT Images: 3 FINDINGS: Comminuted fracture of the proximal humerus  Humeral shaft is displaced from the humeral head which is located within the glenoid  Mild widening at the glenohumeral joint likely represents pseudosubluxation  No significant degenerative changes  No lytic or blastic osseous lesion  Soft tissues are unremarkable  Impression: Comminuted displaced fracture of the proximal humerus  Workstation performed: [id number]     XR shoulder 2+ views LEFT    Result Date: 2/17/2022  Narrative: LEFT SHOULDER INDICATION:   fall, left shoulder pain  COMPARISON:  Earlier study same day VIEWS:  XR SHOULDER 2+ VW LEFT FINDINGS: Displaced and comminuted fracture of the left proximal humerus is identified similar to the earlier examination  It would appear that the humeral head remains articulated with the scapula wall the humeral neck is displaced from the head  No significant degenerative changes  No lytic or blastic osseous lesion  Soft tissues are unremarkable  Impression: Displaced comminuted left proximal humeral fracture Workstation performed: [id number]     XR elbow 2 vw left    Result Date: 2/18/2022  Narrative: LEFT ELBOW INDICATION:   elbow pain/ecchymosis s/p fall  COMPARISON:  None VIEWS:  A single image is submitted   1 FINDINGS: Limited single image study  There is no acute fracture or dislocation  There is no joint effusion  No significant degenerative changes  No lytic or blastic osseous lesion  Soft tissues are unremarkable  Impression: Limited single image study shows no acute osseous deformity  Workstation performed: [id number]     CT head wo contrast    Result Date: 2/17/2022  Narrative: CT BRAIN - WITHOUT CONTRAST INDICATION:   Head trauma, minor (Age >= 65y) fall  COMPARISON:  CT brain dated July 29, 2019  TECHNIQUE:  CT examination of the brain was performed  In addition to axial images, sagittal and coronal 2D reformatted images were created and submitted for interpretation  Radiation dose length product (DLP) for this visit:  948 mGy-cm   This examination, like all CT scans performed in the [facility], was performed utilizing techniques to minimize radiation dose exposure, including the use of iterative reconstruction and automated exposure control  IMAGE QUALITY:  Diagnostic  FINDINGS: PARENCHYMA:  No intracranial mass, mass effect or midline shift  No CT signs of acute infarction  No acute parenchymal hemorrhage  There is mild periventricular white matter low attenuation which is nonspecific and most likely related to chronic small vessel ischemic changes  VENTRICLES AND EXTRA-AXIAL SPACES:  Normal for the patient's age  VISUALIZED ORBITS AND PARANASAL SINUSES:  Unremarkable  CALVARIUM AND EXTRACRANIAL SOFT TISSUES:  Normal      Impression: No acute intracranial abnormality  Mild chronic small vessel ischemic changes  Workstation performed: [id number]     CT upper extremity wo contrast left    Result Date: 2/17/2022  Narrative: CT left shoulder without IV contrast INDICATION: Fracture, humerus Fracture, shoulder displaced fracture  COMPARISON: None  TECHNIQUE: CT examination of the above was performed    This examination, like all CT scans performed in the [facility], was performed utilizing techniques to minimize radiation dose exposure, including the use of iterative reconstruction  and automated exposure control software  Sagittal and coronal two dimensional reconstructed images were also submitted for interpretation  Rad dose  1299 mGy-cm FINDINGS: OSSEOUS STRUCTURES:  Extensively comminuted fracture at the surgical neck of the left proximal humerus with angulation and overriding of the fracture fragments with distal shaft displaced anterior to the humeral head  Glenohumeral joint appears intact  VISUALIZED MUSCULATURE:  Unremarkable  SOFT TISSUES:  Unremarkable  OTHER PERTINENT FINDINGS:  None  Impression: Extensively comminuted fracture at the surgical neck of left proximal humerus as detailed above  Glenohumeral joint appears intact  Workstation performed: [id number]     EKG/ Monitor: Personally reviewed  Repeat EKG done shows normal sinus with few PACs  Low voltage due to body attenuation artifact  QS in V1 to V3 most likely due to lead location  Code Status: Prior  Advance Directive and Living Will:      POLST:        Shy Love MD MD, [facility]      "This note has been constructed using a voice recognition system  Therefore there may be syntax, spelling, and/or grammatical errors   Please call if you have any questions  "

## 2022-03-02 PROBLEM — E87.5 HYPERKALEMIA: Status: ACTIVE | Noted: 2022-03-02

## 2022-03-02 LAB
ANION GAP SERPL CALCULATED.3IONS-SCNC: 5 MMOL/L (ref 4–13)
ANION GAP SERPL CALCULATED.3IONS-SCNC: 7 MMOL/L (ref 4–13)
APTT PPP: 31 SECONDS (ref 23–37)
BUN SERPL-MCNC: 26 MG/DL (ref 5–25)
BUN SERPL-MCNC: 29 MG/DL (ref 5–25)
CALCIUM SERPL-MCNC: 7.5 MG/DL (ref 8.3–10.1)
CALCIUM SERPL-MCNC: 7.6 MG/DL (ref 8.3–10.1)
CHLORIDE SERPL-SCNC: 104 MMOL/L (ref 100–108)
CHLORIDE SERPL-SCNC: 106 MMOL/L (ref 100–108)
CO2 SERPL-SCNC: 26 MMOL/L (ref 21–32)
CO2 SERPL-SCNC: 27 MMOL/L (ref 21–32)
CREAT SERPL-MCNC: 1.31 MG/DL (ref 0.6–1.3)
CREAT SERPL-MCNC: 1.8 MG/DL (ref 0.6–1.3)
ERYTHROCYTE [DISTWIDTH] IN BLOOD BY AUTOMATED COUNT: 16.8 % (ref 11.6–15.1)
GFR SERPL CREATININE-BSD FRML MDRD: 28 ML/MIN/1.73SQ M
GFR SERPL CREATININE-BSD FRML MDRD: 41 ML/MIN/1.73SQ M
GLUCOSE P FAST SERPL-MCNC: 128 MG/DL (ref 65–99)
GLUCOSE SERPL-MCNC: 109 MG/DL (ref 65–140)
GLUCOSE SERPL-MCNC: 117 MG/DL (ref 65–140)
GLUCOSE SERPL-MCNC: 126 MG/DL (ref 65–140)
GLUCOSE SERPL-MCNC: 128 MG/DL (ref 65–140)
GLUCOSE SERPL-MCNC: 145 MG/DL (ref 65–140)
GLUCOSE SERPL-MCNC: 465 MG/DL (ref 65–140)
HCT VFR BLD AUTO: 27.7 % (ref 34.8–46.1)
HGB BLD-MCNC: 8.1 G/DL (ref 11.5–15.4)
MCH RBC QN AUTO: 28.8 PG (ref 26.8–34.3)
MCHC RBC AUTO-ENTMCNC: 29.2 G/DL (ref 31.4–37.4)
MCV RBC AUTO: 99 FL (ref 82–98)
PLATELET # BLD AUTO: 271 THOUSANDS/UL (ref 149–390)
PMV BLD AUTO: 10.1 FL (ref 8.9–12.7)
POTASSIUM SERPL-SCNC: 5.8 MMOL/L (ref 3.5–5.3)
POTASSIUM SERPL-SCNC: 6.6 MMOL/L (ref 3.5–5.3)
RBC # BLD AUTO: 2.81 MILLION/UL (ref 3.81–5.12)
SODIUM SERPL-SCNC: 137 MMOL/L (ref 136–145)
SODIUM SERPL-SCNC: 138 MMOL/L (ref 136–145)
WBC # BLD AUTO: 11.72 THOUSAND/UL (ref 4.31–10.16)

## 2022-03-02 PROCEDURE — 99024 POSTOP FOLLOW-UP VISIT: CPT | Performed by: ORTHOPAEDIC SURGERY

## 2022-03-02 PROCEDURE — 85730 THROMBOPLASTIN TIME PARTIAL: CPT | Performed by: PHYSICIAN ASSISTANT

## 2022-03-02 PROCEDURE — 94640 AIRWAY INHALATION TREATMENT: CPT

## 2022-03-02 PROCEDURE — 97535 SELF CARE MNGMENT TRAINING: CPT

## 2022-03-02 PROCEDURE — 83036 HEMOGLOBIN GLYCOSYLATED A1C: CPT | Performed by: PHYSICIAN ASSISTANT

## 2022-03-02 PROCEDURE — 80048 BASIC METABOLIC PNL TOTAL CA: CPT | Performed by: PHYSICIAN ASSISTANT

## 2022-03-02 PROCEDURE — 82948 REAGENT STRIP/BLOOD GLUCOSE: CPT

## 2022-03-02 PROCEDURE — 97110 THERAPEUTIC EXERCISES: CPT

## 2022-03-02 PROCEDURE — 97116 GAIT TRAINING THERAPY: CPT

## 2022-03-02 PROCEDURE — 94760 N-INVAS EAR/PLS OXIMETRY 1: CPT

## 2022-03-02 PROCEDURE — 80048 BASIC METABOLIC PNL TOTAL CA: CPT | Performed by: INTERNAL MEDICINE

## 2022-03-02 PROCEDURE — 99220 PR INITIAL OBSERVATION CARE/DAY 70 MINUTES: CPT | Performed by: INTERNAL MEDICINE

## 2022-03-02 PROCEDURE — 97167 OT EVAL HIGH COMPLEX 60 MIN: CPT

## 2022-03-02 PROCEDURE — 85027 COMPLETE CBC AUTOMATED: CPT | Performed by: PHYSICIAN ASSISTANT

## 2022-03-02 PROCEDURE — 97163 PT EVAL HIGH COMPLEX 45 MIN: CPT

## 2022-03-02 RX ORDER — SODIUM POLYSTYRENE SULFONATE 4.1 MEQ/G
15 POWDER, FOR SUSPENSION ORAL; RECTAL ONCE
Status: COMPLETED | OUTPATIENT
Start: 2022-03-02 | End: 2022-03-02

## 2022-03-02 RX ORDER — INSULIN GLARGINE 100 [IU]/ML
40 INJECTION, SOLUTION SUBCUTANEOUS EVERY 12 HOURS SCHEDULED
Status: DISCONTINUED | OUTPATIENT
Start: 2022-03-02 | End: 2022-03-05

## 2022-03-02 RX ORDER — ALBUTEROL SULFATE 2.5 MG/3ML
2.5 SOLUTION RESPIRATORY (INHALATION) ONCE
Status: COMPLETED | OUTPATIENT
Start: 2022-03-02 | End: 2022-03-02

## 2022-03-02 RX ORDER — SODIUM CHLORIDE 9 MG/ML
75 INJECTION, SOLUTION INTRAVENOUS CONTINUOUS
Status: DISCONTINUED | OUTPATIENT
Start: 2022-03-02 | End: 2022-03-02

## 2022-03-02 RX ORDER — SODIUM POLYSTYRENE SULFONATE 4.1 MEQ/G
30 POWDER, FOR SUSPENSION ORAL; RECTAL ONCE
Status: COMPLETED | OUTPATIENT
Start: 2022-03-02 | End: 2022-03-02

## 2022-03-02 RX ORDER — DEXTROSE 10 % IN WATER 10 %
125 INTRAVENOUS SOLUTION INTRAVENOUS ONCE
Status: COMPLETED | OUTPATIENT
Start: 2022-03-02 | End: 2022-03-02

## 2022-03-02 RX ADMIN — INSULIN GLARGINE 40 UNITS: 100 INJECTION, SOLUTION SUBCUTANEOUS at 21:22

## 2022-03-02 RX ADMIN — PREGABALIN 100 MG: 100 CAPSULE ORAL at 18:04

## 2022-03-02 RX ADMIN — METOPROLOL TARTRATE 50 MG: 50 TABLET, FILM COATED ORAL at 10:25

## 2022-03-02 RX ADMIN — PREGABALIN 100 MG: 100 CAPSULE ORAL at 10:15

## 2022-03-02 RX ADMIN — ALBUTEROL SULFATE 2.5 MG: 2.5 SOLUTION RESPIRATORY (INHALATION) at 10:40

## 2022-03-02 RX ADMIN — DOCUSATE SODIUM 100 MG: 100 CAPSULE ORAL at 18:04

## 2022-03-02 RX ADMIN — PRAVASTATIN SODIUM 80 MG: 80 TABLET ORAL at 16:22

## 2022-03-02 RX ADMIN — MONTELUKAST 10 MG: 10 TABLET, FILM COATED ORAL at 21:22

## 2022-03-02 RX ADMIN — SODIUM POLYSTYRENE SULFONATE 30 G: 1 POWDER ORAL; RECTAL at 10:09

## 2022-03-02 RX ADMIN — SODIUM POLYSTYRENE SULFONATE 15 G: 1 POWDER ORAL; RECTAL at 16:22

## 2022-03-02 RX ADMIN — INSULIN HUMAN 10 UNITS: 100 INJECTION, SOLUTION PARENTERAL at 13:38

## 2022-03-02 RX ADMIN — INSULIN LISPRO 5 UNITS: 100 INJECTION, SOLUTION INTRAVENOUS; SUBCUTANEOUS at 13:41

## 2022-03-02 RX ADMIN — DEXTROSE 125 ML: 10 SOLUTION INTRAVENOUS at 10:15

## 2022-03-02 RX ADMIN — LORATADINE 10 MG: 10 TABLET ORAL at 10:15

## 2022-03-02 RX ADMIN — INSULIN LISPRO 12 UNITS: 100 INJECTION, SOLUTION INTRAVENOUS; SUBCUTANEOUS at 11:47

## 2022-03-02 RX ADMIN — METOPROLOL TARTRATE 50 MG: 50 TABLET, FILM COATED ORAL at 21:22

## 2022-03-02 RX ADMIN — INSULIN GLARGINE 40 UNITS: 100 INJECTION, SOLUTION SUBCUTANEOUS at 13:45

## 2022-03-02 RX ADMIN — NYSTATIN: 100000 POWDER TOPICAL at 10:25

## 2022-03-02 RX ADMIN — SODIUM CHLORIDE 75 ML/HR: 0.9 INJECTION, SOLUTION INTRAVENOUS at 09:27

## 2022-03-02 RX ADMIN — AMLODIPINE BESYLATE 5 MG: 5 TABLET ORAL at 10:15

## 2022-03-02 RX ADMIN — DOCUSATE SODIUM 100 MG: 100 CAPSULE ORAL at 10:14

## 2022-03-02 RX ADMIN — VANCOMYCIN HYDROCHLORIDE 1000 MG: 1 INJECTION, SOLUTION INTRAVENOUS at 02:58

## 2022-03-02 RX ADMIN — NYSTATIN: 100000 POWDER TOPICAL at 18:04

## 2022-03-02 NOTE — ASSESSMENT & PLAN NOTE
Lab Results   Component Value Date    HGBA1C 10 3 01/21/2020   Patient can resume Toujeo insulin 40 units b i d  And Humalog 5 units with meals  Can continue Humalog sliding scale  Will need to restart patient on Lantus 40 units b i d   While in the hospital along with Humalog  Patient will also be on diabetic diet urine

## 2022-03-02 NOTE — ASSESSMENT & PLAN NOTE
Lab Results   Component Value Date    EGFR 41 03/02/2022    EGFR 55 03/01/2022    EGFR 30 02/20/2022    CREATININE 1 31 (H) 03/02/2022    CREATININE 1 03 03/01/2022    CREATININE 1 68 (H) 02/20/2022   History of CKD stage 3  Baseline creatinine around 1 2-1 4  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    Creatinine continued remained close to baseline  Follow-up with nephrology as outpatient with follow-up BMP in 1 week

## 2022-03-02 NOTE — PROGRESS NOTES
[address]  Progress Note Madhav Corley 1950, 73 y o  female MRN: [id number]  Unit/Bed#: 2 [place] [address] Encounter: [id number]  Primary Care Provider: Leana French   Date and time admitted to hospital: 3/1/2022 10:20 AM    Hyperkalemia  Assessment & Plan  Potassium level upon admission was 5 7 which increased to 6 6  Patient not on any medications that can cause hyperkalemia  Patient will be placed on low-potassium diet  Patient was given insulin with D10, nebulizer treatment and Kayexalate 30 grams this morning  Repeat BMP 4 hours after the regimen  Patient was previously on torsemide which might need to be restarted  Outpatient follow-up with Nephrology and follow-up BMP in 1 week    * Closed 4-part fracture of proximal humerus, left, initial encounter  Assessment & Plan  Patient sustained a left proximal humerus fracture after a fall  Patient underwent left shoulder reverse arthroplasty by Orthopedics POD# 1  Further care per orthopedics  Encourage incentive spirometry, pain management    Paroxysmal atrial fibrillation [facility])  Assessment & Plan  Patient has known history of proximal atrial fibrillation patient currently in sinus rhythm  Continue metoprolol 50 milligram p o  B i d  And anticoagulation with Eliquis  Patient follows up with Dr Mcfarlane Few    Diabetes mellitus, type 2 [facility])  Assessment & Plan    Lab Results   Component Value Date    HGBA1C 10 3 01/21/2020   Patient can resume Toujeo insulin 40 units b i d   And Humalog 5 units with meals  Can continue Humalog sliding scale    Chronic kidney disease  Assessment & Plan  Lab Results   Component Value Date    EGFR 41 03/02/2022    EGFR 55 03/01/2022    EGFR 30 02/20/2022    CREATININE 1 31 (H) 03/02/2022    CREATININE 1 03 03/01/2022    CREATININE 1 68 (H) 02/20/2022   History of CKD stage 3  Baseline creatinine around 1 2-1 4  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  Creatinine continued remained close to baseline  Follow-up with nephrology as outpatient with follow-up BMP in 1 week          VTE Prophylaxis:   Low Risk (Score 0-2) - Encourage Ambulation  Recommendations for Discharge:  · Patient needs to be on low-potassium diet with follow-up BMP in 1 week  Patient will also need outpatient follow-up with Nephrology  Might need to restart torsemide based on further potassium levels  Counseling / Coordination of Care Time: 60 minutes Greater than 50% of total time spent on patient counseling and coordination of care  Collaboration of Care: Were Recommendations Directly Discussed with Primary Treatment Team? Yes    History of Present Illness:  Darrell Patel is a 73 y o  female who is originally admitted to the orthopedic service due to left humerus fracture  We are consulted for medical management of hyperkalemia and diabetes  Patient has known history of proximal atrial fibrillation, CKD stage 2, diabetes mellitus, GERD, hypertension, hyperlipidemia, kidney stones  Patient sustained a fall under CT scan of the left shoulder on February 17, 2022 showed extensively comminuted displaced fracture of the proximal humerus  Patient was seen by Orthopedics who recommended reverse shoulder arthroplasty  Patient was seen by Cardiology and was cleared for surgery and patient later underwent reverse shoulder arthroplasty  Patient currently complaining of some pain in the left shoulder  Denies any chest pain, abdominal pain, nausea, vomiting    Review of Systems:  Review of Systems   Constitutional: Negative for activity change, appetite change, chills, diaphoresis, fatigue, fever and unexpected weight change  HENT: Negative for congestion, ear discharge, ear pain, facial swelling, hearing loss, mouth sores, nosebleeds, postnasal drip, rhinorrhea, sinus pressure, sneezing, sore throat, tinnitus, trouble swallowing and voice change      Eyes: Negative for photophobia, discharge, redness and visual disturbance  Respiratory: Negative for cough, chest tightness, shortness of breath, wheezing and stridor  Cardiovascular: Negative for chest pain, palpitations and leg swelling  Gastrointestinal: Negative for abdominal distention, abdominal pain, anal bleeding, blood in stool, constipation, diarrhea, nausea and vomiting  Endocrine: Negative for polydipsia, polyphagia and polyuria  Genitourinary: Negative for decreased urine volume, difficulty urinating, dysuria, flank pain, hematuria, menstrual problem, pelvic pain, urgency, vaginal bleeding and vaginal discharge  Musculoskeletal: Negative for arthralgias, back pain and neck stiffness  Left shoulder pain   Skin: Negative for pallor and rash  Neurological: Negative for dizziness, seizures, facial asymmetry, speech difficulty, light-headedness, numbness and headaches  Hematological: Negative for adenopathy  Psychiatric/Behavioral: Negative for agitation and confusion         Past Medical and Surgical History:   Past Medical History:   Diagnosis Date    A[facility]fib ([facility] [address] )     Anemia     Asthma     Chronic kidney disease     CKD (chronic kidney disease)     Diabetes mellitus (HCC)     GERD (gastroesophageal reflux disease)     Hyperlipidemia     Hypertension     Kidney stones     PONV (postoperative nausea and vomiting)     SVT [facility]supraventricular tachycardia) ([facility])        Past Surgical History:   Procedure Laterality Date    APPENDECTOMY      CYSTOSCOPY W/ LASER LITHOTRIPSY Left 7/12/2016    Procedure: CYSTOSCOPY URETEROSCOPY WITH LITHOTRIPSY HOLMIUM LASER, RETROGRADE PYELOGRAM AND INSERTION STENT URETERAL;  Surgeon: Francine Garcia MD;  Location: [address] [place] [address];  Service:     DILATION AND CURETTAGE OF UTERUS      JOINT REPLACEMENT      right knee    KNEE ARTHROPLASTY Right     AL CYSTOURETHROSCOPY,URETER CATHETER Left 6/29/2016    Procedure: CYSTOSCOPY RETROGRADE PYELOGRAM WITH INSERTION STENT URETERAL, left;  Surgeon: Jose Goldsmith MD;  Location: [address] [place] [address];  Service: Urology    [place] RECONSTR TOTAL SHOULDER IMPLANT Left 3/1/2022    Procedure: ARTHROPLASTY SHOULDER REVERSE;  Surgeon: Jey Goldman MD;  [facility]: [facility];  Service: Orthopedics    SHOULDER ARTHROTOMY Left        Meds/Allergies:  all medications and allergies reviewed    Allergies: Allergies   Allergen Reactions    Penicillins Hives    Moxifloxacin Other (See Comments)     unknown    Percocet [Oxycodone-Acetaminophen] Other (See Comments)     unknown    Zinc Acetate Other (See Comments)     unknown    Nuts - Food Allergy Other (See Comments)    Asa [Aspirin] GI Intolerance    Indocin [Indomethacin] Other (See Comments)     Made patient "loopy"    Other Other (See Comments)     unknown       Social History:  Marital Status: Single  Substance Use History:   Social History     Substance and Sexual Activity   Alcohol Use Not Currently    Comment: rarely     Social History     Tobacco Use   Smoking Status Former Smoker    Packs/day: 1 00    Years: 20 00    Pack years: 20 00    Types: Cigarettes    Quit date: 1996    Years since quittin 6   Smokeless Tobacco Never Used     Social History     Substance and Sexual Activity   Drug Use No       Family History:  Family History   Problem Relation Age of Onset    Heart disease Mother     Emphysema Maternal Grandmother     Heart disease Family        Physical Exam:   Vitals:   Blood Pressure: 121/57 (22 1013)  Pulse: 84 (22 034)  Temperature: (!) 97 4 °F (36 3 °C) (22)  Temp Source: Oral (22)  Respirations: 20 (22)  Height: 5' 2" (157 5 cm) (22)  Weight - Scale: 127 kg (279 lb 15 8 oz) (22)  SpO2: 95 % (22 1040)    Physical Exam  Constitutional:       Appearance: Normal appearance  HENT:      Head: Normocephalic and atraumatic        Nose: Nose normal       Mouth/Throat:      Mouth: Mucous membranes are moist       Pharynx: Oropharynx is clear  Eyes:      Extraocular Movements: Extraocular movements intact  Pupils: Pupils are equal, round, and reactive to light  Cardiovascular:      Rate and Rhythm: Normal rate and regular rhythm  Pulmonary:      Effort: Pulmonary effort is normal       Breath sounds: Normal breath sounds  Abdominal:      General: Bowel sounds are normal  There is no distension  Palpations: Abdomen is soft  Tenderness: There is no abdominal tenderness  Musculoskeletal:         General: No swelling  Cervical back: Normal range of motion and neck supple  Skin:     General: Skin is warm and dry  Neurological:      General: No focal deficit present  Mental Status: She is alert  Additional Data:   Lab Results:    Results from last 7 days   Lab Units 03/02/22  0551   WBC Thousand/uL 11 72*   HEMOGLOBIN g/dL 8 1*   HEMATOCRIT % 27 7*   PLATELETS Thousands/uL 271     Results from last 7 days   Lab Units 03/02/22  0551 03/01/22  1246 03/01/22  1246   SODIUM mmol/L 138   < > 139   POTASSIUM mmol/L 6 6*   < > 5 7*   CHLORIDE mmol/L 106   < > 107   CO2 mmol/L 27   < > 28   BUN mg/dL 26*   < > 24   CREATININE mg/dL 1 31*   < > 1 03   ANION GAP mmol/L 5   < > 4   CALCIUM mg/dL 7 6*   < > 8 4   ALBUMIN g/dL  --   --  2 8*   TOTAL BILIRUBIN mg/dL  --   --  0 77   ALK PHOS U/L  --   --  69   ALT U/L  --   --  23   AST U/L  --   --  23   GLUCOSE RANDOM mg/dL 128   < > 74    < > = values in this interval not displayed               Lab Results   Component Value Date/Time    HGBA1C 10 3 01/21/2020 12:00 AM    HGBA1C 8 5 (H) 08/01/2019 05:05 AM    HGBA1C 8 4 (H) 03/09/2018 05:48 AM    HGBA1C 9 0 (H) 06/30/2016 06:07 AM     Results from last 7 days   Lab Units 03/02/22  1127 03/02/22  0704 03/01/22  2131 03/01/22  1839 03/01/22  1620 03/01/22  1542 03/01/22  1450   POC GLUCOSE mg/dl 465* 126 70 85 121 63* 63*           Imaging: Reviewed radiology reports from this admission including: ECHO, left shoulder x-ray  XR shoulder left 1 view    (Results Pending)       EKG, Pathology, and Other Studies Reviewed on Admission:   · EKG:  Could not be obtained    ** Please Note: This note may have been constructed using a voice recognition system   **

## 2022-03-02 NOTE — CONSULTS
[address] [facility] 1950, 73 y o  female MRN: [id number]  Unit/Bed#: 2 [place] [address] Encounter: [id number]  Primary Care Provider: Joana Macdonald   Date and time admitted to hospital: 3/1/2022 10:20 AM          Hyperkalemia  Assessment & Plan  Potassium level upon admission was 5 7 which increased to 6 6  Patient not on any medications that can cause hyperkalemia  Patient will be placed on low-potassium diet  Patient was given insulin with D10, nebulizer treatment and Kayexalate 30 grams this morning  Repeat BMP 4 hours after the regimen  Patient was previously on torsemide which might need to be restarted  Outpatient follow-up with Nephrology and follow-up BMP in 1 week  EKG could not be obtained as patient's left shoulder is in sling     * Closed 4-part fracture of proximal humerus, left, initial encounter  Assessment & Plan  Patient sustained a left proximal humerus fracture after a fall  Patient underwent left shoulder reverse arthroplasty by Orthopedics POD# 1  Further care per orthopedics  Encourage incentive spirometry, pain management     Paroxysmal atrial fibrillation [facility])  Assessment & Plan  Patient has known history of proximal atrial fibrillation patient currently in sinus rhythm  Continue metoprolol 50 milligram p o  B i d  And anticoagulation with Eliquis  Patient follows up with Dr Tess Catherine     Diabetes mellitus, type 2 [facility])  Assessment & Plan           Lab Results   Component Value Date     HGBA1C 10 3 01/21/2020   Patient can resume Toujeo insulin 40 units b i d  And Humalog 5 units with meals  Can continue Humalog sliding scale  Will need to restart patient on Lantus 40 units b i d   While in the hospital along with Humalog  Patient will also be on diabetic diet      Chronic kidney disease  Assessment & Plan        Lab Results   Component Value Date     EGFR 41 03/02/2022     EGFR 55 03/01/2022     EGFR 30 02/20/2022     CREATININE 1 31 (H) 03/02/2022     CREATININE 1 03 03/01/2022     CREATININE 1 68 (H) 02/20/2022   History of CKD stage 3  Baseline creatinine around 1 2-1 4  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  Creatinine continued remained close to baseline  Follow-up with nephrology as outpatient with follow-up BMP in 1 week        VTE Prophylaxis:   Low Risk (Score 0-2) - Encourage Ambulation      Recommendations for Discharge:  · Patient needs to be on low-potassium diet with follow-up BMP in 1 week   Patient will also need outpatient follow-up with Nephrology  Sindhu Hamilton need to restart torsemide based on further potassium levels      Counseling / Coordination of Care Time: 60 minutes Greater than 50% of total time spent on patient counseling and coordination of care      Collaboration of Care: Were Recommendations Directly Discussed with Primary Treatment Team? Yes     History of Present Illness:  Deanne Otto a 73 y o  female who is originally admitted to the orthopedic service due to left humerus fracture  We are consulted for medical management of hyperkalemia and diabetes   Patient has known history of proximal atrial fibrillation, CKD stage 2, diabetes mellitus, GERD, hypertension, hyperlipidemia, kidney stones   Patient sustained a fall under CT scan of the left shoulder on February 17, 2022 showed extensively comminuted displaced fracture of the proximal humerus   Patient was seen by Orthopedics who recommended reverse shoulder arthroplasty   Patient was seen by Cardiology and was cleared for surgery and patient later underwent reverse shoulder arthroplasty   Patient currently complaining of some pain in the left shoulder   Denies any chest pain, abdominal pain, nausea, vomiting     Review of Systems:  Review of Systems   Constitutional: Negative for activity change, appetite change, chills, diaphoresis, fatigue, fever and unexpected weight change     HENT: Negative for congestion, ear discharge, ear pain, facial swelling, hearing loss, mouth sores, nosebleeds, postnasal drip, rhinorrhea, sinus pressure, sneezing, sore throat, tinnitus, trouble swallowing and voice change     Eyes: Negative for photophobia, discharge, redness and visual disturbance  Respiratory: Negative for cough, chest tightness, shortness of breath, wheezing and stridor     Cardiovascular: Negative for chest pain, palpitations and leg swelling  Gastrointestinal: Negative for abdominal distention, abdominal pain, anal bleeding, blood in stool, constipation, diarrhea, nausea and vomiting  Endocrine: Negative for polydipsia, polyphagia and polyuria  Genitourinary: Negative for decreased urine volume, difficulty urinating, dysuria, flank pain, hematuria, menstrual problem, pelvic pain, urgency, vaginal bleeding and vaginal discharge  Musculoskeletal: Negative for arthralgias, back pain and neck stiffness         Left shoulder pain   Skin: Negative for pallor and rash  Neurological: Negative for dizziness, seizures, facial asymmetry, speech difficulty, light-headedness, numbness and headaches  Hematological: Negative for adenopathy     Psychiatric/Behavioral: Negative for agitation and confusion          Past Medical and Surgical History:   Medical History           Past Medical History:   Diagnosis Date    A-fib (HCC)      Anemia      Asthma      Chronic kidney disease      CKD (chronic kidney disease)      Diabetes mellitus (HCC)      GERD (gastroesophageal reflux disease)      Hyperlipidemia      Hypertension      Kidney stones      PONV (postoperative nausea and vomiting)      SVT [facility]supraventricular tachycardia) ([facility])              Surgical History             Past Surgical History:   Procedure Laterality Date    APPENDECTOMY        CYSTOSCOPY W/ LASER LITHOTRIPSY Left 7/12/2016     Procedure: CYSTOSCOPY URETEROSCOPY WITH LITHOTRIPSY HOLMIUM LASER, RETROGRADE PYELOGRAM AND INSERTION STENT URETERAL;  Surgeon: Lorrayne Cooks, MD;  Location: [facility];  Service:     DILATION AND CURETTAGE OF UTERUS        JOINT REPLACEMENT         right knee    KNEE ARTHROPLASTY Right      [place] CYSTOURETHROSCOPY,URETER CATHETER Left 2016     Procedure: CYSTOSCOPY RETROGRADE PYELOGRAM WITH INSERTION STENT URETERAL, left;  Surgeon: Lorrayne Cooks, MD;  Location: [facility];  Service: Urology    [place] RECONSTR TOTAL SHOULDER IMPLANT Left 3/1/2022     Procedure: ARTHROPLASTY SHOULDER REVERSE;  Surgeon: Mortimer Fix, MD;  Location: [facility];  Service: Orthopedics    SHOULDER ARTHROTOMY Left              Meds/Allergies:  all medications and allergies reviewed     Allergies:             Allergies   Allergen Reactions    Penicillins Hives    Moxifloxacin Other (See Comments)       unknown    Percocet [Oxycodone-Acetaminophen] Other (See Comments)       unknown    Zinc Acetate Other (See Comments)       unknown    Nuts - Food Allergy Other (See Comments)    Asa [Aspirin] GI Intolerance    Indocin [Indomethacin] Other (See Comments)       Made patient "loopy"    Other Other (See Comments)       unknown         Social History:  Marital Status: Single  Substance Use History:   Social History              Substance and Sexual Activity   Alcohol Use Not Currently     Comment: rarely      Social History              Tobacco Use   Smoking Status Former Smoker    Packs/day: 1 00    Years: 20 00    Pack years: 20 00    Types: Cigarettes    Quit date: 1996    Years since quittin 6   Smokeless Tobacco Never Used      Social History            Substance and Sexual Activity   Drug Use No         Family History:            Family History   Problem Relation Age of Onset    Heart disease Mother      Emphysema Maternal Grandmother      Heart disease Family           Physical Exam:   Vitals:   Blood Pressure: 121/57 (22 1013)  Pulse: 84 (22 0341)  Temperature: (!) 97 4 °F (36 3 °C) (22 1927)  Temp Source: Oral (03/02/22 0341)  Respirations: 20 (03/02/22 0341)  Height: 5' 2" (157 5 cm) (03/01/22 1940)  Weight - Scale: 127 kg (279 lb 15 8 oz) (03/01/22 1940)  SpO2: 95 % (03/02/22 1040)     Physical Exam  Constitutional:       Appearance: Normal appearance  HENT:      Head: Normocephalic and atraumatic       Nose: Nose normal       Mouth/Throat:      Mouth: Mucous membranes are moist       Pharynx: Oropharynx is clear  Eyes:      Extraocular Movements: Extraocular movements intact       Pupils: Pupils are equal, round, and reactive to light  Cardiovascular:      Rate and Rhythm: Normal rate and regular rhythm  Pulmonary:      Effort: Pulmonary effort is normal       Breath sounds: Normal breath sounds  Abdominal:      General: Bowel sounds are normal  There is no distension       Palpations: Abdomen is soft       Tenderness: There is no abdominal tenderness  Musculoskeletal:         General: No swelling       Cervical back: Normal range of motion and neck supple  Skin:     General: Skin is warm and dry     Neurological:      General: No focal deficit present       Mental Status: She is alert           Additional Data:   Lab Results:             Results from last 7 days   Lab Units 03/02/22  0551   WBC Thousand/uL 11 72*   HEMOGLOBIN g/dL 8 1*   HEMATOCRIT % 27 7*   PLATELETS Thousands/uL 271                  Results from last 7 days   Lab Units 03/02/22  0551 03/01/22  1246 03/01/22  1246   SODIUM mmol/L 138   < > 139   POTASSIUM mmol/L 6 6*   < > 5 7*   CHLORIDE mmol/L 106   < > 107   CO2 mmol/L 27   < > 28   BUN mg/dL 26*   < > 24   CREATININE mg/dL 1 31*   < > 1 03   ANION GAP mmol/L 5   < > 4   CALCIUM mg/dL 7 6*   < > 8 4   ALBUMIN g/dL  --   --  2 8*   TOTAL BILIRUBIN mg/dL  --   --  0 77   ALK PHOS U/L  --   --  69   ALT U/L  --   --  23   AST U/L  --   --  23   GLUCOSE RANDOM mg/dL 128   < > 74    < > = values in this interval not displayed                         Lab Results Component Value Date/Time     HGBA1C 10 3 01/21/2020 12:00 AM     HGBA1C 8 5 (H) 08/01/2019 05:05 AM     HGBA1C 8 4 (H) 03/09/2018 05:48 AM     HGBA1C 9 0 (H) 06/30/2016 06:07 AM                          Results from last 7 days   Lab Units 03/02/22  1127 03/02/22  0704 03/01/22  2131 03/01/22  1839 03/01/22  1620 03/01/22  1542 03/01/22  1450   POC GLUCOSE mg/dl 465* 126 70 85 121 61* 61*             Imaging: Reviewed radiology reports from this admission including: ECHO, left shoulder x-ray  XR shoulder left 1 view    (Results Pending)         EKG, Pathology, and Other Studies Reviewed on Admission:   · EKG:  Could not be obtained     ** Please Note: This note may have been constructed using a voice recognition system   **

## 2022-03-02 NOTE — ASSESSMENT & PLAN NOTE
Lab Results   Component Value Date    EGFR 41 03/02/2022    EGFR 55 03/01/2022    EGFR 30 02/20/2022    CREATININE 1 31 (H) 03/02/2022    CREATININE 1 03 03/01/2022    CREATININE 1 68 (H) 02/20/2022   History of CKD stage 3  Baseline creatinine around 1 2-1 4  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    Creatinine has gotten worse and has increased to 2 1  Monitor PVR  Avoid nephrotoxic agents and hypotension  Restarted on fluids with normal saline at 75 mL/hour

## 2022-03-02 NOTE — DISCHARGE INSTRUCTIONS
Sling:   Wear your sling at all times after your surgery (this includes sleeping)  Additionally, you should not carry anything heavier than a pencil in your hand  Dressing:   Leave dressing in place  Showering: You may shower 3 days after surgery  Please use CAUTION!! Be careful not to slip and fall  The effects of anesthesia and/or medication may make you drowsy or light-headed  Do not soak in a bathtub, hot tub, or pool until the doctor tells you it is O K , to do so  Your dressing should be waterproof  If this gets soaked in the shower you may remove this  While in the shower you must keep the arm across the front of the body as if it were still in the sling  Sleeping:   You will most likely have difficulty sleeping in the first few weeks after surgery  Most people find it more comfortable to sleep in a reclining position  You can either sleep in a recliner chair or create this position with pillows  Ice:   You can ice the shoulder to reduce swelling and discomfort  Do not ice the shoulder more than 20 minutes at a time  Let the shoulder warm up before reapplication  Avoid getting you wound wet  If you have a Cryocuff you may keep this on continuously  Follow-up visit:   You need to see the doctor about one week following surgery for your first post-op visit  You will be given a prescription to begin physical therapy if you were not already given one  Common concerns:   Bruising and/or swelling of the shoulder, arm, or hand are common after surgery  To relieve this discomfort it is best to ice the shoulder  Please call if:   1  Any oozing or redness of the wound, fevers (>101 3°F), or chills  2  Any difficulty breathing or heaviness in the chest      REMEMBER - these are only guidelines for what to expect  If you have any questions or concerns, please do not hesitate to call the office  [phone]

## 2022-03-02 NOTE — PROGRESS NOTES
Progress Note - Orthopedics   Brookie Kawasaki 73 y o  female MRN: [id number]  Unit/Bed#: [address] [place] [address] Encounter: [id number]    Assessment:  Status post left reverse total shoulder for 4 part proximal humerus fracture postoperative day 1    Plan:  Rogelio Garcia is doing very well today from a shoulder perspective and her x-ray looks appropriate  She does however have significant difficulty trying to ambulate and would need significant assistance which she will be unable to get at home with just her niece taking care of her  Thus, we do feel that she should go back to the rehab facility  She can be discharged there today  We appreciate the hospitalist service helping with managing her potassium which has been consistently high during her care  She was seen by Cardiology yesterday  We will see her back in 1 week for repeat clinical evaluation and repeat x-ray  She did work with physical therapy today and her block is still quite dense  I spoke with the therapist directly and we agree that she is more appropriate to go back to rehab today  She can restart her anticoagulation 24 hours postoperatively  Weight bearing:  Sling left shoulder at all times        Subjective:  Taz Schmid states that she is doing quite well on postoperative day 1 status post reverse total shoulder arthroplasty for fracture  Her left shoulder she says is doing well with no pain and she still has a dense block  Vitals: Blood pressure 124/57, pulse 84, temperature (!) 97 4 °F (36 3 °C), temperature source Oral, resp  rate 20, height 5' 2" (1 575 m), weight 127 kg (279 lb 15 8 oz), SpO2 100 %, not currently breastfeeding  ,Body mass index is 51 21 kg/m²        Intake/Output Summary (Last 24 hours) at 3/2/2022 0959  Last data filed at 3/1/2022 1917  Gross per 24 hour   Intake 900 ml   Output 100 ml   Net 800 ml       Invasive Devices  Report    Peripheral Intravenous Line            Peripheral IV 03/01/22 Right Wrist <1 day                Physical Exam:   Ortho Exam: Shoulder:  Left shoulder incision dressing is clean and dry and intact  She still has a dense block and has little to no movement into the left upper extremity actively and sensation is decreased throughout secondary to the block  She says she is quite comfortable  Lab, Imaging and other studies:   CBC:   Lab Results   Component Value Date    WBC 11 72 (H) 03/02/2022    HGB 8 1 (L) 03/02/2022    HCT 27 7 (L) 03/02/2022    MCV 99 (H) 03/02/2022     03/02/2022    MCH 28 8 03/02/2022    MCHC 29 2 (L) 03/02/2022    RDW 16 8 (H) 03/02/2022    MPV 10 1 03/02/2022       Left shoulder x-rays taken postoperatively demonstrate a cemented implant that is in place and located with the tuberosities also reduced

## 2022-03-02 NOTE — ASSESSMENT & PLAN NOTE
Potassium level upon admission was 5 7 which increased to 6 6  Patient not on any medications that can cause hyperkalemia  Patient will be placed on low-potassium diet  Patient was given insulin with D10, nebulizer treatment and Kayexalate 30 grams this morning  Potassium level improved to 5 6 and was given another dose of Kayexalate with improved potassium to 5 4 today  Patient was previously on torsemide  Patient was given another dose of Kayexalate today

## 2022-03-02 NOTE — PLAN OF CARE
Problem: MOBILITY - ADULT  Goal: Maintain or return to baseline ADL function  Description: INTERVENTIONS:  -  Assess patient's ability to carry out ADLs; assess patient's baseline for ADL function and identify physical deficits which impact ability to perform ADLs (bathing, care of mouth/teeth, toileting, grooming, dressing, etc )  - Assess/evaluate cause of self-care deficits   - Assess range of motion  - Assess patient's mobility; develop plan if impaired  - Assess patient's need for assistive devices and provide as appropriate  - Encourage maximum independence but intervene and supervise when necessary  - Involve family in performance of ADLs  - Assess for home care needs following discharge   - Consider OT consult to assist with ADL evaluation and planning for discharge  - Provide patient education as appropriate  Outcome: Progressing  Goal: Maintains/Returns to pre admission functional level  Description: INTERVENTIONS:  - Perform BMAT or MOVE assessment daily    - Set and communicate daily mobility goal to care team and patient/family/caregiver  - Collaborate with rehabilitation services on mobility goals if consulted  - Perform Range of Motion 3 times a day  - Reposition patient every 2 hours    - Dangle patient 3 times a day  - Stand patient 3  times a day  - Ambulate patient 3 times a day  - Out of bed to chair 2 times a day   - Out of bed for meals 2 times a day  - Out of bed for toileting  - Record patient progress and toleration of activity level   Outcome: Progressing     Problem: Prexisting or High Potential for Compromised Skin Integrity  Goal: Skin integrity is maintained or improved  Description: INTERVENTIONS:  - Identify patients at risk for skin breakdown  - Assess and monitor skin integrity  - Assess and monitor nutrition and hydration status  - Monitor labs   - Assess for incontinence   - Turn and reposition patient  - Assist with mobility/ambulation  - Relieve pressure over bony prominences  - Avoid friction and shearing  - Provide appropriate hygiene as needed including keeping skin clean and dry  - Evaluate need for skin moisturizer/barrier cream  - Collaborate with interdisciplinary team   - Patient/family teaching  - Consider wound care consult   Outcome: Progressing     Problem: Potential for Falls  Goal: Patient will remain free of falls  Description: INTERVENTIONS:  - Educate patient/family on patient safety including physical limitations  - Instruct patient to call for assistance with activity   - Consult OT/PT to assist with strengthening/mobility   - Keep Call bell within reach  - Keep bed low and locked with side rails adjusted as appropriate  - Keep care items and personal belongings within reach  - Initiate and maintain comfort rounds  - Make Fall Risk Sign visible to staff  - Offer Toileting every 2 Hours, in advance of need  - Initiate/Maintain bed alarm  - Obtain necessary fall risk management equipment:   - Apply yellow socks and bracelet for high fall risk patients  - Consider moving patient to room near nurses station  Outcome: Progressing

## 2022-03-02 NOTE — ASSESSMENT & PLAN NOTE
Patient has known history of proximal atrial fibrillation patient currently in sinus rhythm  Continue metoprolol 50 milligram p o  B i d   And anticoagulation with Eliquis  Patient follows up with Dr Tess Catherine

## 2022-03-02 NOTE — PROGRESS NOTES
Nick [id number]  Progress Note Kin Blend 1950, 73 y o  female MRN: [id number]  Unit/Bed#: [address] [place] [address] Encounter: [id number]  Primary Care Provider: Shobha Lazcano   Date and time admitted to hospital: 3/1/2022 10:20 AM    Hyperkalemia  Assessment & Plan  Potassium level upon admission was 5 7 which increased to 6 6  Patient not on any medications that can cause hyperkalemia  Patient will be placed on low-potassium diet  Patient was given insulin with D10, nebulizer treatment and Kayexalate 30 grams this morning  Repeat BMP 4 hours after the regimen  Patient was previously on torsemide which might need to be restarted  Outpatient follow-up with Nephrology and follow-up BMP in 1 week  EKG could not be obtained as patient's left shoulder is in sling    * Closed 4-part fracture of proximal humerus, left, initial encounter  Assessment & Plan  Patient sustained a left proximal humerus fracture after a fall  Patient underwent left shoulder reverse arthroplasty by Orthopedics POD# 1  Further care per orthopedics  Encourage incentive spirometry, pain management    Paroxysmal atrial fibrillation [facility])  Assessment & Plan  Patient has known history of proximal atrial fibrillation patient currently in sinus rhythm  Continue metoprolol 50 milligram p o  B i d  And anticoagulation with Eliquis  Patient follows up with Dr Ti Rojo    Diabetes mellitus, type 2 [facility])  Assessment & Plan    Lab Results   Component Value Date    HGBA1C 10 3 01/21/2020   Patient can resume Toujeo insulin 40 units b i d  And Humalog 5 units with meals  Can continue Humalog sliding scale  Will need to restart patient on Lantus 40 units b i d   While in the hospital along with Humalog  Patient will also be on diabetic diet     Chronic kidney disease  Assessment & Plan  Lab Results   Component Value Date    EGFR 41 03/02/2022    EGFR 55 03/01/2022    EGFR 30 02/20/2022    CREATININE 1 31 (H) 03/02/2022    CREATININE 1 03 03/01/2022    CREATININE 1 68 (H) 02/20/2022   History of CKD stage 3  Baseline creatinine around 1 2-1 4  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  Creatinine continued remained close to baseline  Follow-up with nephrology as outpatient with follow-up BMP in 1 week      VTE Prophylaxis:   Low Risk (Score 0-2) - Encourage Ambulation      Recommendations for Discharge:  · Patient needs to be on low-potassium diet with follow-up BMP in 1 week  Patient will also need outpatient follow-up with Nephrology  Might need to restart torsemide based on further potassium levels      Counseling / Coordination of Care Time: 60 minutes Greater than 50% of total time spent on patient counseling and coordination of care      Collaboration of Care: Were Recommendations Directly Discussed with Primary Treatment Team? Yes     History of Present Illness:  Constantine Gutierrez is a 73 y o  female who is originally admitted to the orthopedic service due to left humerus fracture  We are consulted for medical management of hyperkalemia and diabetes  Patient has known history of proximal atrial fibrillation, CKD stage 2, diabetes mellitus, GERD, hypertension, hyperlipidemia, kidney stones  Patient sustained a fall under CT scan of the left shoulder on February 17, 2022 showed extensively comminuted displaced fracture of the proximal humerus  Patient was seen by Orthopedics who recommended reverse shoulder arthroplasty  Patient was seen by Cardiology and was cleared for surgery and patient later underwent reverse shoulder arthroplasty  Patient currently complaining of some pain in the left shoulder  Denies any chest pain, abdominal pain, nausea, vomiting     Review of Systems:  Review of Systems   Constitutional: Negative for activity change, appetite change, chills, diaphoresis, fatigue, fever and unexpected weight change     HENT: Negative for congestion, ear discharge, ear pain, facial swelling, hearing loss, mouth sores, nosebleeds, postnasal drip, rhinorrhea, sinus pressure, sneezing, sore throat, tinnitus, trouble swallowing and voice change  Eyes: Negative for photophobia, discharge, redness and visual disturbance  Respiratory: Negative for cough, chest tightness, shortness of breath, wheezing and stridor  Cardiovascular: Negative for chest pain, palpitations and leg swelling  Gastrointestinal: Negative for abdominal distention, abdominal pain, anal bleeding, blood in stool, constipation, diarrhea, nausea and vomiting  Endocrine: Negative for polydipsia, polyphagia and polyuria  Genitourinary: Negative for decreased urine volume, difficulty urinating, dysuria, flank pain, hematuria, menstrual problem, pelvic pain, urgency, vaginal bleeding and vaginal discharge  Musculoskeletal: Negative for arthralgias, back pain and neck stiffness  Left shoulder pain   Skin: Negative for pallor and rash  Neurological: Negative for dizziness, seizures, facial asymmetry, speech difficulty, light-headedness, numbness and headaches  Hematological: Negative for adenopathy     Psychiatric/Behavioral: Negative for agitation and confusion          Past Medical and Surgical History:   Medical History        Past Medical History:   Diagnosis Date    [facility]      Anemia      Asthma      Chronic kidney disease      CKD (chronic kidney disease)      Diabetes mellitus (HCC)      GERD (gastroesophageal reflux disease)      Hyperlipidemia      Hypertension      Kidney stones      PONV (postoperative nausea and vomiting)      SVT (supraventricular tachycardia) ([facility])              Surgical History         Past Surgical History:   Procedure Laterality Date    APPENDECTOMY        CYSTOSCOPY W/ LASER LITHOTRIPSY Left 7/12/2016     Procedure: CYSTOSCOPY URETEROSCOPY WITH LITHOTRIPSY HOLMIUM LASER, RETROGRADE PYELOGRAM AND INSERTION STENT URETERAL;  Surgeon: Zach Rae MD; Location: [place] [facility];  Service:     DILATION AND CURETTAGE OF UTERUS        JOINT REPLACEMENT         right knee    KNEE ARTHROPLASTY Right      NC CYSTOURETHROSCOPY,URETER CATHETER Left 2016     Procedure: CYSTOSCOPY RETROGRADE PYELOGRAM WITH INSERTION STENT URETERAL, left;  Surgeon: Rose Ray MD;  Location: [address] [place] [address];  Service: Urology    NC RECONSTR TOTAL SHOULDER IMPLANT Left 3/1/2022     Procedure: ARTHROPLASTY SHOULDER REVERSE;  Surgeon: Tiffany Feliciano MD;  Location: [place] [facility];  Service: Orthopedics    SHOULDER ARTHROTOMY Left              Meds/Allergies:  all medications and allergies reviewed     Allergies:          Allergies   Allergen Reactions    Penicillins Hives    Moxifloxacin Other (See Comments)       unknown    Percocet [Oxycodone-Acetaminophen] Other (See Comments)       unknown    Zinc Acetate Other (See Comments)       unknown    Nuts - Food Allergy Other (See Comments)    Asa [Aspirin] GI Intolerance    Indocin [Indomethacin] Other (See Comments)       Made patient "loopy"    Other Other (See Comments)       unknown         Social History:  Marital Status: Single  Substance Use History:   Social History           Substance and Sexual Activity   Alcohol Use Not Currently     Comment: rarely      Social History           Tobacco Use   Smoking Status Former Smoker    Packs/day: 1 00    Years: 20 00    Pack years: 20 00    Types: Cigarettes    Quit date: 1996    Years since quittin 6   Smokeless Tobacco Never Used      Social History          Substance and Sexual Activity   Drug Use No         Family History:        Family History   Problem Relation Age of Onset    Heart disease Mother      Emphysema Maternal Grandmother      Heart disease Family           Physical Exam:   Vitals:   Blood Pressure: 121/57 (22 1013)  Pulse: 84 (22 0341)  Temperature: (!) 97 4 °F (36 3 °C) (22 0341)  Temp Source: Oral (22 3327)  Respirations: 20 (03/02/22 0341)  Height: 5' 2" (157 5 cm) (03/01/22 1940)  Weight - Scale: 127 kg (279 lb 15 8 oz) (03/01/22 1940)  SpO2: 95 % (03/02/22 1040)     Physical Exam  Constitutional:       Appearance: Normal appearance  HENT:      Head: Normocephalic and atraumatic  Nose: Nose normal       Mouth/Throat:      Mouth: Mucous membranes are moist       Pharynx: Oropharynx is clear  Eyes:      Extraocular Movements: Extraocular movements intact  Pupils: Pupils are equal, round, and reactive to light  Cardiovascular:      Rate and Rhythm: Normal rate and regular rhythm  Pulmonary:      Effort: Pulmonary effort is normal       Breath sounds: Normal breath sounds  Abdominal:      General: Bowel sounds are normal  There is no distension  Palpations: Abdomen is soft  Tenderness: There is no abdominal tenderness  Musculoskeletal:         General: No swelling  Cervical back: Normal range of motion and neck supple  Skin:     General: Skin is warm and dry  Neurological:      General: No focal deficit present        Mental Status: She is alert           Additional Data:   Lab Results:          Results from last 7 days   Lab Units 03/02/22  0551   WBC Thousand/uL 11 72*   HEMOGLOBIN g/dL 8 1*   HEMATOCRIT % 27 7*   PLATELETS Thousands/uL 271             Results from last 7 days   Lab Units 03/02/22  0551 03/01/22  1246 03/01/22  1246   SODIUM mmol/L 138   < > 139   POTASSIUM mmol/L 6 6*   < > 5 7*   CHLORIDE mmol/L 106   < > 107   CO2 mmol/L 27   < > 28   BUN mg/dL 26*   < > 24   CREATININE mg/dL 1 31*   < > 1 03   ANION GAP mmol/L 5   < > 4   CALCIUM mg/dL 7 6*   < > 8 4   ALBUMIN g/dL  --   --  2 8*   TOTAL BILIRUBIN mg/dL  --   --  0 77   ALK PHOS U/L  --   --  69   ALT U/L  --   --  23   AST U/L  --   --  23   GLUCOSE RANDOM mg/dL 128   < > 74    < > = values in this interval not displayed                     Lab Results   Component Value Date/Time     HGBA1C 10 3 01/21/2020 12:00 AM     HGBA1C 8 5 (H) 08/01/2019 05:05 AM     HGBA1C 8 4 (H) 03/09/2018 05:48 AM     HGBA1C 9 0 (H) 06/30/2016 06:07 AM                 Results from last 7 days   Lab Units 03/02/22  1127 03/02/22  0704 03/01/22  2131 03/01/22  1839 03/01/22  1620 03/01/22  1542 03/01/22  1450   POC GLUCOSE mg/dl 465* 126 70 85 121 63* 63*             Imaging: Reviewed radiology reports from this admission including: ECHO, left shoulder x-ray  XR shoulder left 1 view    (Results Pending)         EKG, Pathology, and Other Studies Reviewed on Admission:   · EKG:  Could not be obtained     ** Please Note: This note may have been constructed using a voice recognition system   **

## 2022-03-02 NOTE — CASE MANAGEMENT
Case Management Assessment & Discharge Planning Note    Patient name Shaniqua Reinoso  Location [address] MRN [id number]  : 1950 Date 3/2/2022       Current Admission Date: 3/1/2022  Current Admission Diagnosis:Closed 4-part fracture of proximal humerus, left, initial encounter   Patient Active Problem List    Diagnosis Date Noted    Closed 4-part fracture of proximal humerus, left, initial encounter 2022    PONV (postoperative nausea and vomiting) 2022    Chronic kidney disease 2022    Diabetes mellitus, type 2 [facility] ) 2022    Gastroesophageal reflux disease 2022    Nephrolithiasis 2022    Arthritis 2022    S/P total knee replacement, right 2022    On continuous oral anticoagulation - eliquis 2022    Humeral head fracture 2022    Essential hypertension 2019    Anemia 2019    Mixed hyperlipidemia 2019    Paroxysmal atrial fibrillation ([facility] ) 2019    Lower leg edema 2019    Asthma 2018    Morbid obesity with BMI of 45 0-49 9, adult ([facility] ) 2018      LOS (days): 0  Geometric Mean LOS (GMLOS) (days):   Days to GMLOS:     OBJECTIVE:     Current admission status: Outpatient Surgery    Preferred Pharmacy:   [facility] [address] [place], [place] [address]  Phone: [phone] Fax: [phone]    Primary Care Provider: Leana French    Primary Insurance: MEDICARE  Secondary Insurance: [employer]    ASSESSMENT:  [address] [facility] Representative - Relative   Primary Phone: [phone] (Mobile)               Readmission Root Cause  30 Day Readmission: No    Patient Information  Admitted from[de-identified] Facility ([facility])  Mental Status: Alert  During Assessment patient was accompanied by: Not accompanied during assessment  Assessment information provided by[de-identified] Patient  Primary Caregiver: Other (Comment)  Caregiver's Name[de-identified] Sweta Dural Relationship to Patient[de-identified] Family Member  Caregiver's Telephone Number[de-identified] [phone]  Support Systems: Family members  [place] of Residence: [address] [place] [address] [place] do you live in?: [address] of Daily Living Prior to Admission  Functional Status: Assistance  Completes ADLs independently?: No  Level of ADL dependence: Assistance  Ambulates independently?: No  Level of ambulatory dependence: Assistance  Does patient use assisted devices?: Yes  Assisted Devices (DME) used: Wheelchair  Does the patient have a history of Short-Term Rehab?: Yes ([facility] currently)    DISCHARGE DETAILS:    Discharge planning discussed with[de-identified] Patient  Freedom of Choice: Yes  Comments - Freedom of Choice: Case discussed with Dr Mireya Caballero  Per Dr Mireya Caballero pt can transfer back to rehab facility this afternoon  Pt was admitted following planned shoulder surgery  SW met with pt to discuss DCP  Per pt she has been at [facility] [address] at [facility] for rehab and is requesting transfer back to continue recovery    CM contacted family/caregiver?: Yes  Were Treatment Team discharge recommendations reviewed with patient/caregiver?: Yes  Did patient/caregiver verbalize understanding of patient care needs?: Yes  Were patient/caregiver advised of the risks associated with not following Treatment Team discharge recommendations?: Yes    Contacts  Patient Contacts: Figueroa Patel  Relationship to Patient[de-identified] Family  Contact Method: Phone  Phone Number: [phone]  Reason/Outcome: Discharge Planning    Other Referral/Resources/Interventions Provided:  Interventions: Short Term Rehab  Referral Comments: Referral made to [facility] to prepare for pt's return    Treatment Team Recommendation: Short Term Rehab  Discharge Destination Plan[de-identified] Short Term Rehab  Transport at Discharge : Bob Contacted: Yes  Number/Name of Dispatcher: SUZI    ETA of Transport (Date): 03/02/22  ETA of Transport (Time): 1500 (requested)      Accepting Facility Name, [facility] [address] : [facility] [place], [place]  Receiving Facility/Agency Phone Number: [phone]

## 2022-03-02 NOTE — ASSESSMENT & PLAN NOTE
Potassium level upon admission was 5 7 which increased to 6 6  Patient not on any medications that can cause hyperkalemia  Patient will be placed on low-potassium diet  Patient was given insulin with D10, nebulizer treatment and Kayexalate 30 grams this morning  Repeat BMP 4 hours after the regimen  Patient was previously on torsemide which might need to be restarted  Outpatient follow-up with Nephrology and follow-up BMP in 1 week  EKG could not be obtained as patient's left shoulder is in sling

## 2022-03-02 NOTE — PLAN OF CARE
Problem: MOBILITY - ADULT  Goal: Maintain or return to baseline ADL function  Description: INTERVENTIONS:  -  Assess patient's ability to carry out ADLs; assess patient's baseline for ADL function and identify physical deficits which impact ability to perform ADLs (bathing, care of mouth/teeth, toileting, grooming, dressing, etc )  - Assess/evaluate cause of self-care deficits   - Assess range of motion  - Assess patient's mobility; develop plan if impaired  - Assess patient's need for assistive devices and provide as appropriate  - Encourage maximum independence but intervene and supervise when necessary  - Involve family in performance of ADLs  - Assess for home care needs following discharge   - Consider OT consult to assist with ADL evaluation and planning for discharge  - Provide patient education as appropriate  Outcome: Progressing  Goal: Maintains/Returns to pre admission functional level  Description: INTERVENTIONS:  - Perform BMAT or MOVE assessment daily    - Set and communicate daily mobility goal to care team and patient/family/caregiver  - Collaborate with rehabilitation services on mobility goals if consulted  - Perform Range of Motion 2 times a day  - Reposition patient every 2 hours    - Dangle patient 2 times a day  - Stand patient 2 times a day  - Out of bed to chair 2 times a day   - Out of bed for meals 2 times a day  - Out of bed for toileting  - Record patient progress and toleration of activity level   Outcome: Progressing     Problem: Prexisting or High Potential for Compromised Skin Integrity  Goal: Skin integrity is maintained or improved  Description: INTERVENTIONS:  - Identify patients at risk for skin breakdown  - Assess and monitor skin integrity  - Assess and monitor nutrition and hydration status  - Monitor labs   - Assess for incontinence   - Turn and reposition patient  - Assist with mobility/ambulation  - Relieve pressure over bony prominences  - Avoid friction and shearing  - Provide appropriate hygiene as needed including keeping skin clean and dry  - Evaluate need for skin moisturizer/barrier cream  - Collaborate with interdisciplinary team   - Patient/family teaching  - Consider wound care consult   Outcome: Progressing

## 2022-03-02 NOTE — OCCUPATIONAL THERAPY NOTE
Occupational Therapy Evaluation/Treatment       03/02/22 0925   Note Type   Note type Evaluation   Restrictions/Precautions   LUE Weight Bearing Per Order NWB   Braces or Orthoses Sling  (LUE abductor brace)   Other Precautions Fall Risk;Pain;O2  (wrist/elbow ROM only per order )   Pain Assessment   Pain Assessment Tool 0-10   Pain Score 7   Pain Location/Orientation Orientation: Left; Location: Shoulder   Home Living   Type of Home Other (Comment)  (pt admitted from [facility])   [facility] to live on main level with bedroom/bathroom  (2 JESS at nieces home where pt plans to go upon D/c)   [address]   Additional Comments pt reports niece ordered a hospital bed and wheelchair   Prior Function   Level of [place] Needs assistance with ADLs and functional mobility; Needs assistance with IADLs   Lives With Facility staff   Receives Help From Personal care attendant   ADL Assistance Needs assistance   IADLs Needs assistance   Falls in the last 6 months 1 to 4   Comments pt s/p L humeral fracture, went to [facility] and has a reverse TSA 3/1/22      Lifestyle   Intrinsic Gratification family, great neices    ADL   Eating Assistance 4  Minimal Assistance   Grooming Assistance 3  Moderate Assistance   UB Bathing Assistance 2  Maximal Assistance   LB Bathing Assistance 2  Maximal Assistance   UB Dressing Assistance 2  Maximal Assistance   LB Dressing Assistance 2  Maximal [address]  2  Maximal Assistance   Bed Mobility   Supine to Sit 3  Moderate assistance   Additional items Assist x 1;Verbal cues   Transfers   Sit to Stand 3  Moderate assistance   Additional items Assist x 1;Verbal cues   Stand to Sit 3  Moderate assistance   Additional items Assist x 1;Verbal cues   Functional Mobility   Functional Mobility 3  Moderate assistance   Additional Comments few steps with mod assist of 1 and min assist of another with hand hold assist on R   Balance Static Sitting Fair   Dynamic Sitting Fair -   Static Standing Poor +   Dynamic Standing Poor   Activity Tolerance   Activity Tolerance Patient limited by fatigue  (dizziness, weakness, unsteady gait, spO2 87% room air activi)   Nurse Made Aware yes, Viviana Anand Assessment   RUE Assessment WFL   LUE Assessment   LUE Assessment   (sling, only elbow, wrist hand ROM, PROM elbow/wrist)   LUE Overall AROM   L Mass Grasp can  slightly but limited due to nerve block, unable to fully grasp    Cognition   Overall Cognitive Status WFL   Arousal/Participation Cooperative   Attention Within functional limits   Orientation Level Oriented X4   Following Commands Follows multistep commands with increased time or repetition   Assessment   Limitation Decreased ADL status; Decreased UE strength;Decreased Safe judgement during ADL;Decreased endurance;Decreased UE ROM; Decreased self-care trans;Decreased high-level ADLs  (decreased balance and mobility )   Prognosis Good   Assessment Patient evaluated by Occupational Therapy  Patient admitted with Closed 4-part fracture of proximal humerus, left, initial encounter s/p reverse total shoulder replacement 3/1/22  The patients occupational profile, medical and therapy history includes a extensive additional review of physical, cognitive, or psychosocial history related to current functional performance  Comorbidities affecting functional mobility and ADLS include: Afib, anemia, asthma, CKD, diabetes and hypertension  Prior to admission, patient was requiring assist for functional mobility with hemiwalker, requiring assist for ADLS, requiring assist for IADLS and in short term rehab    The evaluation identifies the following performance deficits: weakness, decreased ROM, impaired balance, decreased endurance, increased fall risk, new onset of impairment of functional mobility, decreased ADLS, decreased IADLS, pain, decreased activity tolerance, decreased safety awareness, impaired judgement, ortheopedic restrictions and decreased strength, that result in activity limitations and/or participation restrictions  This evaluation requires clinical decision making of high complexity, because the patient presents with comorbidites that affect occupational performance and required significant modification of tasks or assistance with consideration of multiple treatment options  The patient's raw score on the AM-PAC Daily Activity inpatient short form low function score is 17, standardized score is Low Function Daily Activity Standardized Score: 28 95  Patients with a standardized score less than 39 4 are likely to benefit from discharge to post-acute rehab services  Please refer to the recommendation of the Occupational Therapist for safe discharge planning  Patient will benefit from skilled Occupational Therapy services to address above deficits and facilitate a safe return to prior level of function  Goals   [facility]   STG Time Frame   (1-7 days)   Short Term Goal  Goals established to promote Patient Goals: To go to my [facility] of possible:  Patient will increase standing tolerance to 1 minutes during ADL task to decrease assistance level and decrease fall risk; Patient will increase bed mobility to min assist in preparation for ADLS and transfers;  Patient will increase functional mobility to and from bathroom with vin-walker with mod assist to increase performance with ADLS and to use a toilet; Patient will tolerate 10 minutes of RUE ROM/strengthening/LUE per order (wrist and elbow) to increase general activity tolerance and performance in ADLS/IADLS; Patient will improve functional activity tolerance to 10 minutes of sustained functional tasks to increase participation in basic self-care and decrease assistance level;   Patient will increase dynamic sitting balance to fair to improve the ability to sit at edge of bed or on a chair for ADLS;  Patient will increase dynamic standing balance to poor+ to improve postural stability and decrease fall risk during standing ADLS and transfers  LTG Time Frame   (8-14 days)   Long Term Goal Patient will increase standing tolerance to 2 minutes during ADL task to decrease assistance level and decrease fall risk; Patient will increase bed mobility to supervision in preparation for ADLS and transfers; Patient will increase functional mobility to and from bathroom with vin-walker with min assist to increase performance with ADLS and to use a toilet; Patient will tolerate 20 minutes of RUE ROM/strengthening/LUE per order (wrist and elbow) to increase general activity tolerance and performance in ADLS/IADLS; Patient will improve functional activity tolerance to 20 minutes of sustained functional tasks to increase participation in basic self-care and decrease assistance level;   Patient will increase dynamic sitting balance to fair+ to improve the ability to sit at edge of bed or on a chair for ADLS;  Patient will increase dynamic standing balance to fair- to improve postural stability and decrease fall risk during standing ADLS and transfers  Pt will score >/= 13/24 on AM-PAC Daily Activity Inpatient scale to promote safe independence with ADLs and functional mobility; Pt will score >/= 60/100 on Barthel Index in order to decrease caregiver assistance needed and increase ability to perform ADLs and functional mobility     Functional Transfer Goals   Pt Will Perform All Functional Transfers   (STG mod assist LTG min assist )   ADL Goals   Pt Will Perform Eating   (STG supervision LTG independent )   Pt Will Perform Grooming   (STG min assist LTG supervision )   Pt Will Perform Bathing   (STG mod assist LTG min assist )   Pt Will Perform UE Dressing   (STG mod assist LTG min assist )   Pt Will Perform LE Dressing   (STG mod assist LTG min assist )   Pt Will Perform Toileting   (STG mod assist LTG min assist )   Plan Treatment Interventions ADL retraining;Functional transfer training;UE strengthening/ROM; Endurance training;Patient/family training;Equipment evaluation/education; Activityengagement; Compensatory technique education   Goal Expiration Date 03/16/22   OT Frequency 5x/wk   Additional Treatment Session   Start Time 0850   End Time 0925   Treatment Assessment S: 7/10 L shoulder pain  O: Commode transfer with mod assist   Hygiene for urination dependent and underwear management with mod assist required for balance  Few steps with hemiwalker to chair with mod assist of 1 and min assist of another  While supine completed elbow/wrist PROM x 10   x 10 supine, unable to fully  pt still having the effects of nerve block  A: Patient requires assist for all ADLS and unsteady gait and requires mod assist of 1 and min assist of 1 for transfers  Patient will benefit from STR  Dr Toshia Whalen present and aware    P: STR    Recommendation   OT Discharge Recommendation Post acute rehabilitation services   AM-PAC Daily Activity Inpatient   Lower Body Dressing 1   Bathing 1   Toileting 1   Upper Body Dressing 1   Grooming 2   Eating 3   Daily Activity Raw Score 9   Turning Head Towards Sound 4   Follow Simple Instructions 4   Low Function Daily Activity Raw Score 17   Low Function Daily Activity Standardized Score 28 95   AM-PAC Applied Cognition Inpatient   Following a Speech/Presentation 4   Understanding Ordinary Conversation 4   Taking Medications 4   Remembering Where Things Are Placed or Put Away 4   Remembering List of 4-5 Errands 4   Taking Care of Complicated Tasks 4   Applied Cognition Raw Score 24   Applied Cognition Standardized Score 62 21   Barthel Index   Feeding 5   Bathing 0   Grooming Score 0   Dressing Score 5   Bladder Score 5   Bowels Score 5   Toilet Use Score 5   Transfers (Bed/Chair) Score 5   Mobility (Level Surface) Score 0   Stairs Score 0   Barthel Index Score 30   Licensure   [place] License Number Oracio Jean Baptiste MS OTR/L [id number]

## 2022-03-02 NOTE — ASSESSMENT & PLAN NOTE
Patient sustained a left proximal humerus fracture after a fall  Patient underwent left shoulder reverse arthroplasty by Orthopedics POD# 1  Further care per orthopedics  Encourage incentive spirometry, pain management

## 2022-03-02 NOTE — PHYSICAL THERAPY NOTE
PHYSICAL THERAPY EVALUATION/TREATMENT     03/02/22 0935   PT Last Visit   PT Visit Date 03/02/22   Note Type   Note type Evaluation   Pain Assessment   Pain Assessment Tool 0-10   Pain Score 7   Pain Location/Orientation Orientation: Left; Location: Shoulder   Restrictions/Precautions   LUE Weight Bearing Per Order NWB   Braces or Orthoses Other (Comment)  (Left upper extremity abduction brace)   Other Precautions Pain; Fall Risk;Bed Alarm; Chair Alarm   Home Living   Type of Home Other (Comment)  (Pt admitted from [address] [place] [address])   [address]; Shower chair   Home Equipment Cane   Additional Comments Pt is hoping to go to [facility] upon discharge, which is a 1 level house with 2 JESS with a rail on the right-hand side  Pt and her niece planned to obtain a hospital bed and a wheelchair for discharge  Prior Function   Level of [place] Needs assistance with ADLs and functional mobility   Lives With Facility staff   Receives Help From Personal care attendant   ADL Assistance Needs assistance   IADLs Needs assistance   Falls in the last 6 months 1 to 4   Comments Pt was ambulating with assistance at rehab and using a vin walker   General   Additional Pertinent History Pt suffered a left humeral fracture a few weeks ago and was admitted on 03/01/2022 for a reverse total shoulder  Cognition   Overall Cognitive Status WFL   Arousal/Participation Cooperative   Orientation Level Oriented X4   Following Commands Follows multistep commands with increased time or repetition   Subjective   Subjective Pt reports 7/10 pain left upper extremity and mild dizziness   RLE Assessment   RLE Assessment WFL  (3-to 3/5)   LLE Assessment   LLE Assessment WFL  (3-to 3/5)   Transfers   Sit to Stand 3  Moderate assistance   Additional items Assist x 1;Verbal cues   Stand to Sit 3  Moderate assistance   Additional items Assist x 1;Verbal cues   Ambulation/Elevation   Gait pattern Step to; Foward flexed  (Forward leaning) Gait Assistance 3  Moderate assist  (Mod assist of 1 and Min assist of 1)   Additional items Assist x 1;Assist x 2   Assistive Device None  (Moderate handhold assist of 1 and Min assist of another)   Distance 5 ft with a wheelchair follow   Balance   Static Sitting Fair   Static Standing Poor +   Dynamic Standing Poor   Ambulatory Poor   Activity Tolerance   Activity Tolerance Patient limited by fatigue;Treatment limited secondary to medical complications (Comment)  (Dizziness; generalized weakness; moderately unsteady)   Assessment   Problem List Decreased strength;Decreased range of motion;Decreased endurance; Impaired balance;Decreased mobility; Decreased coordination; Impaired judgement;Decreased safety awareness;Pain;Orthopedic restrictions; Obesity   Assessment Patient seen for Physical Therapy evaluation  Patient admitted with Closed 4-part fracture of proximal humerus, left, initial encounter  Comorbidities affecting patient's physical performance include:  P AFib, CKD, dm 2, arthritis, status post right TKA, asthma, morbid obesity, lower leg edema, essential hypertension, anemia, humeral head fracture 02/17/2022  Personal factors affecting patient at time of initial evaluation include: ambulating with assistive device, inability to navigate community distances, inability to navigate level surfaces without external assistance, inability to perform dynamic tasks in community and positive fall history  Prior to admission, patient was requiring assist for functional mobility with hemiwalker, requiring assist for ADLS and in short term rehab    Please find objective findings from Physical Therapy assessment regarding body systems outlined above with impairments and limitations including weakness, decreased ROM, impaired balance, decreased endurance, impaired coordination, gait deviations, pain, decreased activity tolerance, decreased functional mobility tolerance, decreased safety awareness, impaired judgement and fall risk  The Barthel Index was used as a functional outcome tool presenting with a score of Barthel Index Score: 30 today indicating marked limitations of functional mobility and ADLS  Patient's clinical presentation is currently unstable/unpredictable as seen in patient's presentation of vital sign response, changing level of pain, varying levels of cognitive performance, increased fall risk, new onset of impairment of functional mobility, decreased endurance and new onset of weakness  Pt would benefit from continued Physical Therapy treatment to address deficits as defined above and maximize level of functional mobility  As demonstrated by objective findings, the assigned level of complexity for this evaluation is high  The patient's AM-[facility] Basic Mobility Inpatient Short Form Raw Score is 8  A Raw score of less than or equal to 16 suggests the patient may benefit from discharge to post-acute rehabilitation services  Please also refer to the recommendation of the Physical Therapist for safe discharge planning  Goals   Patient Goals To go to my [facility] house of possible   STG Expiration Date 03/09/22   Short Term Goal #1 Bed mobility and transfers-Min assist   Short Term Goal #2 Pt will ambulate with a vin walker short, functional distances with Min assist; increase balance to P+/F-or greater with gait and functional mobility with a vin walker to decrease fall risk   LTG Expiration Date 03/16/22   Long Term Goal #1 Bed mobility and transfers-S/I; pt will ambulate short, functional distances with a vin walker-S/I   Long Term Goal #2 Balance with a vin walker will be F/F+ or greater for safe gait and mobility and to decrease fall risk; strength lower extremities 3 to 3+/5   Plan   Treatment/Interventions ADL retraining;Functional transfer training;LE strengthening/ROM; Therapeutic exercise; Endurance training;Patient/family training;Equipment eval/education; Bed mobility;Gait training; Compensatory technique education   PT Frequency Other (Comment)  (Daily)   Recommendation   PT Discharge Recommendation Post acute rehabilitation services   AM-PAC Basic Mobility Inpatient   Turning in Bed Without Bedrails 2   Lying on Back to Sitting on Edge of Flat Bed 2   Moving Bed to Chair 1   Standing Up From Chair 1   Walk in Room 1   Climb 3-5 Stairs 1   Basic Mobility Inpatient Raw Score 8   Turning Head Towards Sound 3   Follow Simple Instructions 3   Low Function Basic Mobility Raw Score 14   Low Function Basic Mobility Standardized Score 22 01   Highest Level Of Mobility   [facility] Goal 3: Sit at edge of bed   [facility] Highest Level of Mobility 4: Move to chair/commode   [facility] Goal Achieved Yes   Barthel Index   Feeding 5   Bathing 0   Grooming Score 0   Dressing Score 5   Bladder Score 5   Bowels Score 5   Toilet Use Score 5   Transfers (Bed/Chair) Score 5   Mobility (Level Surface) Score 0   Stairs Score 0   Barthel Index Score 30   Additional Treatment Session   Start Time 0935   End Time 0945   Treatment Assessment S:  I feel a little dizzy O:  Pt transfers sit to stand with mod assist of 1 and ambulates 8 ft with a vin walker with mod assist of 1 and Min assist of another  Pt transfers stand to sit with mod assist of 1  Ambulation is with a wheelchair follow  Pt performs seated hip flexion, LAQ, and ankle pumps bilaterally times 10 reps  A:  Pt with limited tolerance to transfers, short distance ambulation due to pain left upper extremity, generalized fatigue and weakness, and dizziness  Pt is forward leaning during transfers and ambulation and is moderately unsteady  Pt remains at high risk for falls  Pt will continue to benefit from skilled physical therapy services to increase her functional mobility and gait, as well as her balance and endurance  Pt is appropriate for post acute rehab services at this time and is not safe for discharge to her niece's house as pt preferred    Pt is in agreement with recommendation for post acute rehab services  End of Consult   Patient Position at End of Consult Bedside chair; All needs within reach  (Pt is in a wheelchair and RN is aware)   Licensure   [place] License Number  Ramez Abbott PT [id number]     Portions of the documentation may have been created using voice recognition software  Occasional wrong word or sound alike substitutions may have occurred due to the inherent limitation of the voice recognition software  Read the chart carefully and recognize, using context, where substitutions have occurred

## 2022-03-02 NOTE — ASSESSMENT & PLAN NOTE
Patient sustained a left proximal humerus fracture after a fall  Patient underwent left shoulder reverse arthroplasty by Orthopedics POD# 2  Further care per orthopedics  Encourage incentive spirometry, pain management  PT/OT

## 2022-03-02 NOTE — ASSESSMENT & PLAN NOTE
Patient has known history of proximal atrial fibrillation patient currently in sinus rhythm  Continue metoprolol 50 milligram p o  B i d   And anticoagulation with Eliquis  Patient follows up with Dr Lola Gottron

## 2022-03-02 NOTE — ASSESSMENT & PLAN NOTE
Lab Results   Component Value Date    HGBA1C 10 3 01/21/2020   Patient can resume Toujeo insulin 40 units b i d   And Humalog 5 units with meals  Can continue Humalog sliding scale  Blood sugars are better after restarting Lantus and Humalog with meals

## 2022-03-02 NOTE — CASE MANAGEMENT
Case Management Discharge Planning Note    Patient name Stephanie Valencia  Location [address] [place] [address] MRN [id number]  : 1950 Date 3/2/2022       Current Admission Date: 3/1/2022  Current Admission Diagnosis:Closed 4-part fracture of proximal humerus, left, initial encounter   Patient Active Problem List    Diagnosis Date Noted    Hyperkalemia 2022    Closed 4-part fracture of proximal humerus, left, initial encounter 2022    PONV (postoperative nausea and vomiting) 2022    Chronic kidney disease 2022    Diabetes mellitus, type 2 ([facility] [address] [facility] ) 2022    Gastroesophageal reflux disease 2022    Nephrolithiasis 2022    Arthritis 2022    S/P total knee replacement, right 2022    On continuous oral anticoagulation - eliquis 2022    Humeral head fracture 2022    Essential hypertension 2019    Anemia 2019    Mixed hyperlipidemia 2019    Paroxysmal atrial fibrillation ([facility] [facility] ) 2019    Lower leg edema 2019    Asthma 2018    Morbid obesity with BMI of 45 0-49 9, adult ([facility] [address] ) 2018      LOS (days): 0  Geometric Mean LOS (GMLOS) (days):   Days to GMLOS:     OBJECTIVE:     Current admission status: Outpatient Surgery   [facility]acy:   [facility] [address]  Phone: [phone] Fax: [phone]    Primary Care Provider: Costa Berry    Primary Insurance: MEDICARE  Secondary Insurance: [employer]    DISCHARGE DETAILS:    Discharge planning discussed with[de-identified] Dr Gibbs Proper of Choice: Per Dr Micki Rivero pt is not stable for discharge due to abnormal labs  Discharge not planned for today  [facility] and pt's sister    CM contacted family/caregiver?: Yes (notified sister)      Contacts  Patient Contacts: Andre Pod  Relationship to Patient[de-identified] Family  Contact Method: Phone  Phone Number: [phone]  Reason/Outcome: Discharge Planning

## 2022-03-03 ENCOUNTER — EPISODE CHANGES (OUTPATIENT)
Dept: CASE MANAGEMENT | Facility: OTHER | Age: 72
End: 2022-03-03

## 2022-03-03 PROBLEM — N17.9 ACUTE RENAL FAILURE SUPERIMPOSED ON CHRONIC KIDNEY DISEASE (HCC): Status: ACTIVE | Noted: 2022-02-28

## 2022-03-03 LAB
ANION GAP SERPL CALCULATED.3IONS-SCNC: 8 MMOL/L (ref 4–13)
BUN SERPL-MCNC: 39 MG/DL (ref 5–25)
CALCIUM SERPL-MCNC: 7.1 MG/DL (ref 8.3–10.1)
CHLORIDE SERPL-SCNC: 104 MMOL/L (ref 100–108)
CO2 SERPL-SCNC: 24 MMOL/L (ref 21–32)
CREAT SERPL-MCNC: 2.16 MG/DL (ref 0.6–1.3)
EST. AVERAGE GLUCOSE BLD GHB EST-MCNC: 189 MG/DL
GFR SERPL CREATININE-BSD FRML MDRD: 22 ML/MIN/1.73SQ M
GLUCOSE SERPL-MCNC: 121 MG/DL (ref 65–140)
GLUCOSE SERPL-MCNC: 143 MG/DL (ref 65–140)
GLUCOSE SERPL-MCNC: 172 MG/DL (ref 65–140)
GLUCOSE SERPL-MCNC: 176 MG/DL (ref 65–140)
GLUCOSE SERPL-MCNC: 180 MG/DL (ref 65–140)
GLUCOSE SERPL-MCNC: 252 MG/DL (ref 65–140)
HBA1C MFR BLD: 8.2 %
POTASSIUM SERPL-SCNC: 5.6 MMOL/L (ref 3.5–5.3)
SODIUM SERPL-SCNC: 136 MMOL/L (ref 136–145)

## 2022-03-03 PROCEDURE — 97116 GAIT TRAINING THERAPY: CPT

## 2022-03-03 PROCEDURE — 97110 THERAPEUTIC EXERCISES: CPT

## 2022-03-03 PROCEDURE — 99024 POSTOP FOLLOW-UP VISIT: CPT | Performed by: PHYSICIAN ASSISTANT

## 2022-03-03 PROCEDURE — 99232 SBSQ HOSP IP/OBS MODERATE 35: CPT | Performed by: INTERNAL MEDICINE

## 2022-03-03 PROCEDURE — 80048 BASIC METABOLIC PNL TOTAL CA: CPT | Performed by: INTERNAL MEDICINE

## 2022-03-03 PROCEDURE — 82948 REAGENT STRIP/BLOOD GLUCOSE: CPT

## 2022-03-03 PROCEDURE — 97535 SELF CARE MNGMENT TRAINING: CPT

## 2022-03-03 RX ORDER — SODIUM POLYSTYRENE SULFONATE 4.1 MEQ/G
30 POWDER, FOR SUSPENSION ORAL; RECTAL ONCE
Status: COMPLETED | OUTPATIENT
Start: 2022-03-03 | End: 2022-03-03

## 2022-03-03 RX ORDER — SENNOSIDES 8.6 MG
1 TABLET ORAL
Status: DISCONTINUED | OUTPATIENT
Start: 2022-03-03 | End: 2022-03-07 | Stop reason: HOSPADM

## 2022-03-03 RX ORDER — SODIUM POLYSTYRENE SULFONATE 4.1 MEQ/G
15 POWDER, FOR SUSPENSION ORAL; RECTAL ONCE
Status: COMPLETED | OUTPATIENT
Start: 2022-03-03 | End: 2022-03-03

## 2022-03-03 RX ORDER — SODIUM CHLORIDE 9 MG/ML
75 INJECTION, SOLUTION INTRAVENOUS CONTINUOUS
Status: DISCONTINUED | OUTPATIENT
Start: 2022-03-03 | End: 2022-03-04

## 2022-03-03 RX ORDER — POLYETHYLENE GLYCOL 3350 17 G/17G
17 POWDER, FOR SOLUTION ORAL DAILY PRN
Status: DISCONTINUED | OUTPATIENT
Start: 2022-03-03 | End: 2022-03-05

## 2022-03-03 RX ADMIN — INSULIN LISPRO 5 UNITS: 100 INJECTION, SOLUTION INTRAVENOUS; SUBCUTANEOUS at 17:29

## 2022-03-03 RX ADMIN — PREGABALIN 100 MG: 100 CAPSULE ORAL at 17:28

## 2022-03-03 RX ADMIN — METOPROLOL TARTRATE 50 MG: 50 TABLET, FILM COATED ORAL at 21:35

## 2022-03-03 RX ADMIN — HYDROCODONE BITARTRATE AND ACETAMINOPHEN 1 TABLET: 5; 325 TABLET ORAL at 22:24

## 2022-03-03 RX ADMIN — POLYETHYLENE GLYCOL 3350 17 G: 17 POWDER, FOR SOLUTION ORAL at 16:24

## 2022-03-03 RX ADMIN — MONTELUKAST 10 MG: 10 TABLET, FILM COATED ORAL at 21:35

## 2022-03-03 RX ADMIN — APIXABAN 5 MG: 5 TABLET, FILM COATED ORAL at 11:51

## 2022-03-03 RX ADMIN — INSULIN LISPRO 6 UNITS: 100 INJECTION, SOLUTION INTRAVENOUS; SUBCUTANEOUS at 17:29

## 2022-03-03 RX ADMIN — ACETAMINOPHEN 650 MG: 325 TABLET, FILM COATED ORAL at 22:24

## 2022-03-03 RX ADMIN — INSULIN LISPRO 5 UNITS: 100 INJECTION, SOLUTION INTRAVENOUS; SUBCUTANEOUS at 11:56

## 2022-03-03 RX ADMIN — AMLODIPINE BESYLATE 5 MG: 5 TABLET ORAL at 09:50

## 2022-03-03 RX ADMIN — APIXABAN 5 MG: 5 TABLET, FILM COATED ORAL at 17:28

## 2022-03-03 RX ADMIN — DOCUSATE SODIUM 100 MG: 100 CAPSULE ORAL at 17:28

## 2022-03-03 RX ADMIN — SODIUM POLYSTYRENE SULFONATE 30 G: 1 POWDER ORAL; RECTAL at 10:00

## 2022-03-03 RX ADMIN — SODIUM CHLORIDE 75 ML/HR: 9 INJECTION, SOLUTION INTRAVENOUS at 11:51

## 2022-03-03 RX ADMIN — PRAVASTATIN SODIUM 80 MG: 80 TABLET ORAL at 16:21

## 2022-03-03 RX ADMIN — SENNOSIDES 8.6 MG: 8.6 TABLET, FILM COATED ORAL at 21:35

## 2022-03-03 RX ADMIN — METOPROLOL TARTRATE 50 MG: 50 TABLET, FILM COATED ORAL at 09:50

## 2022-03-03 RX ADMIN — PREGABALIN 100 MG: 100 CAPSULE ORAL at 09:50

## 2022-03-03 RX ADMIN — DOCUSATE SODIUM 100 MG: 100 CAPSULE ORAL at 10:01

## 2022-03-03 RX ADMIN — LORATADINE 10 MG: 10 TABLET ORAL at 09:50

## 2022-03-03 RX ADMIN — HYDROCODONE BITARTRATE AND ACETAMINOPHEN 1 TABLET: 5; 325 TABLET ORAL at 06:35

## 2022-03-03 RX ADMIN — INSULIN GLARGINE 40 UNITS: 100 INJECTION, SOLUTION SUBCUTANEOUS at 09:50

## 2022-03-03 RX ADMIN — INSULIN LISPRO 2 UNITS: 100 INJECTION, SOLUTION INTRAVENOUS; SUBCUTANEOUS at 11:55

## 2022-03-03 RX ADMIN — INSULIN GLARGINE 40 UNITS: 100 INJECTION, SOLUTION SUBCUTANEOUS at 21:33

## 2022-03-03 RX ADMIN — SODIUM POLYSTYRENE SULFONATE 15 G: 1 POWDER ORAL; RECTAL at 16:23

## 2022-03-03 NOTE — PLAN OF CARE
Problem: OCCUPATIONAL THERAPY ADULT  Goal: Performs self-care activities at highest level of function for planned discharge setting  See evaluation for individualized goals  Description: Treatment Interventions: ADL retraining,Functional transfer training,UE strengthening/ROM,Endurance training,Patient/family training,Equipment evaluation/education,Activityengagement,Compensatory technique education          See flowsheet documentation for full assessment, interventions and recommendations  Outcome: Progressing  Note: Limitation: Decreased ADL status,Decreased UE strength,Decreased Safe judgement during ADL,Decreased endurance,Decreased UE ROM,Decreased self-care trans,Decreased high-level ADLs (decreased balance and mobility )  Prognosis: Good  Assessment: Patient seen for OT treatment  Patient requires max assist for toileting  patient able to comb hair with setup  Patient is generally weak and deconditioned and unsteady gait  Patient will benefit from STR         OT Discharge Recommendation: Post acute rehabilitation services

## 2022-03-03 NOTE — PHYSICAL THERAPY NOTE
PT TREATMENT     03/03/22 1044   PT Last Visit   PT Visit Date 03/03/22   Note Type   Note Type Treatment   Pain Assessment   Pain Assessment Tool 0-10   Pain Score 7   Pain Location/Orientation Orientation: Left; Location: Shoulder  (RN aware and handling accordingly)   Restrictions/Precautions   Weight Bearing Precautions Per Order Yes   LUE Weight Bearing Per Order NWB   Braces or Orthoses Sling  (abductor sling LUE)   Other Precautions Fall Risk;Pain   General   Chart Reviewed Yes   Family/Caregiver Present No   Cognition   Overall Cognitive Status WFL   Arousal/Participation Cooperative   Attention Within functional limits   Following Commands Follows multistep commands with increased time or repetition   Subjective   Subjective Pt is agreeable to mobility with PT   Bed Mobility   Additional Comments Pt presents in chair   Transfers   Sit to Stand 3  Moderate assistance   Additional items Assist x 1;Verbal cues   Stand to Sit 3  Moderate assistance   Additional items Assist x 1;Verbal cues   Ambulation/Elevation   Gait pattern Step to; Foward flexed   Gait Assistance 3  Moderate assist  (and min A of 1)   Additional items Verbal cues   Assistive Device Adarsh-walker   Distance 3 feet x 2 with changes in direction   Ambulation/Elevation Additional Comments does better with use of adarsh walker   Activity Tolerance   Activity Tolerance Patient limited by fatigue;Patient limited by pain   Nurse Made Aware yes: Jewel Perez   Exercises   Knee AROM Long Arc Quad Sitting;20 reps;Bilateral   Ankle Pumps Sitting;20 reps;Bilateral   Balance training  with Adarsh walker   Assessment   Prognosis Good   Problem List Decreased strength;Decreased endurance; Impaired balance;Decreased mobility;Obesity; Decreased skin integrity;Orthopedic restrictions;Pain   Assessment Pt has difficulty with functional mobility due to unable to use LUE  Pt able to take several steps with hemiwalker and assist of 1 with standby of 1    Pt making slow improvements with mobility  Cont as per plan  The patient's AM-PAC Basic Mobility Inpatient Short Form Raw Score is 11  A Raw score of less than or equal to 16 suggests the patient may benefit from discharge to post-acute rehabilitation services  Please also refer to the recommendation of the Physical Therapist for safe discharge planning  Goals   Patient Goals to move around better   Plan   Treatment/Interventions ADL retraining;Functional transfer training;LE strengthening/ROM; Therapeutic exercise; Endurance training;Patient/family training;Equipment eval/education; Bed mobility;Gait training;Spoke to nursing;Spoke to case management;OT   Progress Progressing toward goals   Recommendation   PT Discharge Recommendation Post acute rehabilitation services   AM-PAC Basic Mobility Inpatient   Turning in Bed Without Bedrails 2   Lying on Back to Sitting on Edge of Flat Bed 2   Moving Bed to Chair 2   Standing Up From Chair 2   Walk in Room 2   Climb 3-5 Stairs 1   Basic Mobility Inpatient Raw Score 11   Basic Mobility Standardized Score 30 25   Turning Head Towards Sound 4   Follow Simple Instructions 3   Low Function Basic Mobility Raw Score 18   Low Function Basic Mobility Standardized Score 29 25   Highest Level Of Mobility   -[facility] Goal 4: Move to chair/commode   -[facility] Highest Level of Mobility 5: Stand (1 or more minutes)   -[facility] Goal Achieved Yes   Education   Education Provided Assistive device   Patient Demonstrates acceptance/verbal understanding;Explanation/teachback used; Reinforcement needed   End of Consult   Patient Position at End of Consult Bedside chair;Bed/Chair alarm activated; All needs within reach   Licensure   [facility] Number  Angeles Rodriguez PT  [id number]

## 2022-03-03 NOTE — PLAN OF CARE
Problem: MOBILITY - ADULT  Goal: Maintain or return to baseline ADL function  Description: INTERVENTIONS:  -  Assess patient's ability to carry out ADLs; assess patient's baseline for ADL function and identify physical deficits which impact ability to perform ADLs (bathing, care of mouth/teeth, toileting, grooming, dressing, etc )  - Assess/evaluate cause of self-care deficits   - Assess range of motion  - Assess patient's mobility; develop plan if impaired  - Assess patient's need for assistive devices and provide as appropriate  - Encourage maximum independence but intervene and supervise when necessary  - Involve family in performance of ADLs  - Assess for home care needs following discharge   - Consider OT consult to assist with ADL evaluation and planning for discharge  - Provide patient education as appropriate  Outcome: Progressing  Goal: Maintains/Returns to pre admission functional level  Description: INTERVENTIONS:  - Perform BMAT or MOVE assessment daily    - Set and communicate daily mobility goal to care team and patient/family/caregiver  - Collaborate with rehabilitation services on mobility goals if consulted  - Perform Range of Motion 4 times a day  - Reposition patient every 2 hours    - Dangle patient 2 times a day  - Stand patient 3 times a day  - Ambulate patient 2 times a day  - Out of bed to chair 3 times a day   - Out of bed for meals 3 times a day  - Out of bed for toileting  - Record patient progress and toleration of activity level   Outcome: Progressing     Problem: Prexisting or High Potential for Compromised Skin Integrity  Goal: Skin integrity is maintained or improved  Description: INTERVENTIONS:  - Identify patients at risk for skin breakdown  - Assess and monitor skin integrity  - Assess and monitor nutrition and hydration status  - Monitor labs   - Assess for incontinence   - Turn and reposition patient  - Assist with mobility/ambulation  - Relieve pressure over bony prominences  - Avoid friction and shearing  - Provide appropriate hygiene as needed including keeping skin clean and dry  - Evaluate need for skin moisturizer/barrier cream  - Collaborate with interdisciplinary team   - Patient/family teaching  - Consider wound care consult   Outcome: Progressing     Problem: Potential for Falls  Goal: Patient will remain free of falls  Description: INTERVENTIONS:  - Educate patient/family on patient safety including physical limitations  - Instruct patient to call for assistance with activity   - Consult OT/PT to assist with strengthening/mobility   - Keep Call bell within reach  - Keep bed low and locked with side rails adjusted as appropriate  - Keep care items and personal belongings within reach  - Initiate and maintain comfort rounds  - Make Fall Risk Sign visible to staff  - Offer Toileting every 2 Hours, in advance of need  - Initiate/Maintain bed/chair alarm  - Obtain necessary fall risk management equipment: walker  - Apply yellow socks and bracelet for high fall risk patients  - Consider moving patient to room near nurses station  Outcome: Progressing

## 2022-03-03 NOTE — PROGRESS NOTES
Progress Note - Orthopedics   Aneesh Tate 73 y o  female MRN: [id number]  Unit/Bed#: [address] [place] [address] Encounter: [id number]      Subjective:  Status post left reverse total shoulder replacement for fracture performed 2 days ago  The patient notes mild discomfort about the shoulder  She notes good sensation of the left upper extremity  She notes being able to move all fingers freely  Patient was not discharged yesterday due to hyperkalemia and hyperglycemia  No acute overnight events  Vitals: Blood pressure 109/51, pulse 80, temperature 98 5 °F (36 9 °C), resp  rate 16, height 5' 2 01" (1 575 m), weight 127 kg (279 lb 15 8 oz), SpO2 98 %, not currently breastfeeding  ,Body mass index is 51 2 kg/m²  Intake/Output Summary (Last 24 hours) at 3/3/2022 1450  Last data filed at 3/3/2022 1101  Gross per 24 hour   Intake --   Output 700 ml   Net -700 ml       Invasive Devices  Report    Peripheral Intravenous Line            Peripheral IV Dorsal (posterior); Right Forearm -- days                Ortho Exam: Alert and oriented X 3  Dressing CDI  Left shoulder: Sling in place  Swelling shoulder with ecchymosis  Sensation intact axillay, median, ulnar, and radial nerve distributions on operative extremity  Moves all fingers freely  2+radial pulse  Lab, Imaging and other studies: I have personally reviewed pertinent lab results      CBC:   Lab Results   Component Value Date    WBC 11 72 (H) 03/02/2022    HGB 8 1 (L) 03/02/2022    HCT 27 7 (L) 03/02/2022    MCV 99 (H) 03/02/2022     03/02/2022    ADJUSTEDWBC 10 70 06/30/2016    MCH 28 8 03/02/2022    MCHC 29 2 (L) 03/02/2022    RDW 16 8 (H) 03/02/2022    MPV 10 1 03/02/2022    NRBC 0 02/19/2022     CMP:   Lab Results   Component Value Date     03/03/2022    CO2 24 03/03/2022    BUN 39 (H) 03/03/2022    CREATININE 2 16 (H) 03/03/2022    CALCIUM 7 1 (L) 03/03/2022    AST 23 03/01/2022    ALT 23 03/01/2022    ALKPHOS 69 03/01/2022    EGFR 22 03/03/2022 PT/INR:   Lab Results   Component Value Date    INR 1 13 02/16/2022                   Assessment:  Status post reverse total shoulder replacement    Plan:  Clinically the patient looks well today, however there was concern about her increasing creatinine  Internal medicine would like the patient to stay another night to be medically optimized prior to discharge  She will continue her sling at all times  Dressing will remain in place  She will resume her anticoagulation at this point  We hope to be able to discharge the patient to back to rehab tomorrow

## 2022-03-03 NOTE — PROGRESS NOTES
[place] [address]  Progress Note Devin Reyes 1950, 73 y o  female MRN: [id number]  Unit/Bed#: [address] [address] Encounter: [id number]  Primary Care Provider: Jade Smith   Date and time admitted to hospital: 3/1/2022 10:20 AM    Hyperkalemia  Assessment & Plan  Potassium level upon admission was 5 7 which increased to 6 6  Patient not on any medications that can cause hyperkalemia  Patient will be placed on low-potassium diet  Patient was given insulin with D10, nebulizer treatment and Kayexalate 30 grams this morning  Potassium level improved to 5 6 and was given another dose of Kayexalate with improved potassium to 5 4 today  Patient was previously on torsemide  Patient was given another dose of Kayexalate today    Assessment & Plan  Potassium level upon admission was 5 7 which increased to 6 6  Patient not on any medications that can cause hyperkalemia  Patient will be placed on low-potassium diet  Patient was given insulin with D10, nebulizer treatment and Kayexalate 30 grams this morning  Repeat BMP 4 hours after the regimen  Patient was previously on torsemide which might need to be restarted  Outpatient follow-up with Nephrology and follow-up BMP in 1 week  EKG could not be obtained as patient's left shoulder is in sling    * Closed 4-part fracture of proximal humerus, left, initial encounter  Assessment & Plan  Patient sustained a left proximal humerus fracture after a fall  Patient underwent left shoulder reverse arthroplasty by Orthopedics POD# 2  Further care per orthopedics  Encourage incentive spirometry, pain management  PT/OT    Assessment & Plan  Patient sustained a left proximal humerus fracture after a fall  Patient underwent left shoulder reverse arthroplasty by Orthopedics POD# 1  Further care per orthopedics  Encourage incentive spirometry, pain management    Acute renal failure superimposed on chronic kidney disease [facility])  Assessment & Plan  Lab Results   Component Value Date    EGFR 41 03/02/2022    EGFR 55 03/01/2022    EGFR 30 02/20/2022    CREATININE 1 31 (H) 03/02/2022    CREATININE 1 03 03/01/2022    CREATININE 1 68 (H) 02/20/2022   History of CKD stage 3  Baseline creatinine around 1 2-1 4  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  Creatinine has gotten worse and has increased to 2 1  Monitor PVR  Avoid nephrotoxic agents and hypotension  Restarted on fluids with normal saline at 75 mL/hour    Assessment & Plan  Lab Results   Component Value Date    EGFR 41 03/02/2022    EGFR 55 03/01/2022    EGFR 30 02/20/2022    CREATININE 1 31 (H) 03/02/2022    CREATININE 1 03 03/01/2022    CREATININE 1 68 (H) 02/20/2022   History of CKD stage 3  Baseline creatinine around 1 2-1 4  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  Creatinine continued remained close to baseline  Follow-up with nephrology as outpatient with follow-up BMP in 1 week    Paroxysmal atrial fibrillation [facility])  Assessment & Plan  Patient has known history of proximal atrial fibrillation patient currently in sinus rhythm  Continue metoprolol 50 milligram p o  B i d  And anticoagulation with Eliquis  Patient follows up with Dr Meliton Govea  Patient has known history of proximal atrial fibrillation patient currently in sinus rhythm  Continue metoprolol 50 milligram p o  B i d  And anticoagulation with Eliquis  Patient follows up with Dr Margarita Cristina    Diabetes mellitus, type 2 [facility])  Assessment & Plan    Lab Results   Component Value Date    HGBA1C 10 3 01/21/2020   Patient can resume Toujeo insulin 40 units b i d  And Humalog 5 units with meals  Can continue Humalog sliding scale  Blood sugars are better after restarting Lantus and Humalog with meals    Assessment & Plan    Lab Results   Component Value Date    HGBA1C 10 3 01/21/2020   Patient can resume Toujeo insulin 40 units b i d   And Humalog 5 units with meals  Can continue Humalog sliding scale  Will need to restart patient on Lantus 40 units b i d  While in the hospital along with Humalog  Patient will also be on diabetic diet urine          VTE Pharmacologic Prophylaxis:   High Risk (Score >/= 5) - Pharmacological DVT Prophylaxis Ordered: apixaban (Eliquis)  Sequential Compression Devices Ordered  Patient Centered Rounds: I performed bedside rounds with nursing staff today  Discussions with Specialists or Other Care Team Provider: Lainey Andrew        Time Spent for Care: 45 minutes  More than 50% of total time spent on counseling and coordination of care as described above  Current Length of Stay: 0 day(s)  Current Patient Status: Inpatient   Certification Statement: The patient will continue to require additional inpatient hospital stay due to Hyperkalemia, acute kidney injury  Discharge Plan: Anticipate discharge in 24-48 hrs to rehab facility  Code Status: Level 1 - Full Code    Subjective:   Patient still complaining of pain in the left shoulder  Patient did not have a bowel movement  Denies any chest pain, shortness of breath, palpitations  Objective:     Vitals:   Temp (24hrs), Av 4 °F (36 9 °C), Min:97 9 °F (36 6 °C), Max:98 8 °F (37 1 °C)    Temp:  [97 9 °F (36 6 °C)-98 8 °F (37 1 °C)] 98 5 °F (36 9 °C)  HR:  [61-83] 80  Resp:  [16-18] 16  BP: (109-132)/(46-57) 109/51  SpO2:  [91 %-100 %] 98 %  Body mass index is 51 2 kg/m²  Input and Output Summary (last 24 hours): Intake/Output Summary (Last 24 hours) at 3/3/2022 1509  Last data filed at 3/3/2022 1101  Gross per 24 hour   Intake --   Output 700 ml   Net -700 ml       Physical Exam:   Physical Exam  Constitutional:       Appearance: Normal appearance  HENT:      Head: Normocephalic and atraumatic  Nose: Nose normal       Mouth/Throat:      Mouth: Mucous membranes are moist       Pharynx: Oropharynx is clear     Eyes:      Extraocular Movements: Extraocular movements intact  Pupils: Pupils are equal, round, and reactive to light  Cardiovascular:      Rate and Rhythm: Normal rate and regular rhythm  Pulmonary:      Effort: Pulmonary effort is normal       Breath sounds: Normal breath sounds  Abdominal:      General: Bowel sounds are normal  There is no distension  Palpations: Abdomen is soft  Tenderness: There is no abdominal tenderness  Musculoskeletal:         General: No swelling  Cervical back: Normal range of motion and neck supple  Comments: Left shoulder in sling  Left shoulder dressing is clean dry and intact  There is some bruising noted around the left shoulder and left upper arm  Skin:     General: Skin is warm and dry  Neurological:      General: No focal deficit present  Mental Status: She is alert  Additional Data:     Labs:  Results from last 7 days   Lab Units 03/02/22  0551   WBC Thousand/uL 11 72*   HEMOGLOBIN g/dL 8 1*   HEMATOCRIT % 27 7*   PLATELETS Thousands/uL 271     Results from last 7 days   Lab Units 03/03/22  0922 03/02/22  0551 03/01/22  1246   SODIUM mmol/L 136   < > 139   POTASSIUM mmol/L 5 6*   < > 5 7*   CHLORIDE mmol/L 104   < > 107   CO2 mmol/L 24   < > 28   BUN mg/dL 39*   < > 24   CREATININE mg/dL 2 16*   < > 1 03   ANION GAP mmol/L 8   < > 4   CALCIUM mg/dL 7 1*   < > 8 4   ALBUMIN g/dL  --   --  2 8*   TOTAL BILIRUBIN mg/dL  --   --  0 77   ALK PHOS U/L  --   --  69   ALT U/L  --   --  23   AST U/L  --   --  23   GLUCOSE RANDOM mg/dL 143*   < > 74    < > = values in this interval not displayed           Results from last 7 days   Lab Units 03/03/22  1058 03/03/22  0708 03/02/22  2048 03/02/22  1616 03/02/22  1127 03/02/22  0704 03/01/22  2131 03/01/22  1839 03/01/22  1620 03/01/22  1542 03/01/22  1450   POC GLUCOSE mg/dl 172* 121 117 145* 465* 126 70 85 121 63* 63*     Results from last 7 days   Lab Units 03/02/22  0009   HEMOGLOBIN A1C % 8 2*           Lines/Drains:  Invasive Devices  Report    Peripheral Intravenous Line            Peripheral IV Dorsal (posterior); Right Forearm -- days                      Imaging: Reviewed radiology reports from this admission including: Left shoulder x-ray    Recent Cultures (last 7 days):         Last 24 Hours Medication List:   Current Facility-Administered Medications   Medication Dose Route Frequency Provider Last Rate    acetaminophen  650 mg Oral Q6H PRN Johnathon Media, PA-C      albuterol  2 puff Inhalation Q6H PRN Johnathon Media, PA-C      amLODIPine  5 mg Oral Daily Johnathon Media, PA-C      apixaban  5 mg Oral BID Johnathon Media, PA-C      docusate sodium  100 mg Oral BID Johnathon Media, PA-C      HYDROcodone-acetaminophen  1 tablet Oral Q4H PRN Jey Goldman MD      insulin glargine  40 Units Subcutaneous Q12H [address] Chao Riggs MD      insulin lispro  2-12 Units Subcutaneous TID AC Tim Oakes, PA-C      insulin lispro  5 Units Subcutaneous TID With Meals Chao Riggs MD      loratadine  10 mg Oral Daily Johnathon Media, PA-C      metoprolol tartrate  50 mg Oral Q12H [address] Johnathon Media, PA-C      montelukast  10 mg Oral HS Johnathon Media, PA-C      nystatin   Topical BID Johnathon Media, PA-C      ondansetron  4 mg Intravenous Q6H PRN Johnathon Media, PA-C      polyethylene glycol  17 g Oral Daily PRN Chao Riggs MD      pravastatin  80 mg Oral Daily With Raoul Murray, PA-C      pregabalin  100 mg Oral BID Johnathon Media, PA-C      senna  1 tablet Oral HS Chao Riggs MD      sodium chloride  75 mL/hr Intravenous Continuous Chao Riggs MD 75 mL/hr (03/03/22 1151)    sodium polystyrene  15 g Oral Once Chao Riggs MD          Today, Patient Was Seen By: Chao Riggs MD    **Please Note: This note may have been constructed using a voice recognition system  **

## 2022-03-03 NOTE — PLAN OF CARE
Problem: Potential for Falls  Goal: Patient will remain free of falls  Description: INTERVENTIONS:  - Educate patient/family on patient safety including physical limitations  - Instruct patient to call for assistance with activity   - Consult OT/PT to assist with strengthening/mobility   - Keep Call bell within reach  - Keep bed low and locked with side rails adjusted as appropriate  - Keep care items and personal belongings within reach  - Initiate and maintain comfort rounds  - Make Fall Risk Sign visible to staff  -   Problem: Prexisting or High Potential for Compromised Skin Integrity  Goal: Skin integrity is maintained or improved  Description: INTERVENTIONS:  - Identify patients at risk for skin breakdown  - Assess and monitor skin integrity  - Assess and monitor nutrition and hydration status  - Monitor labs   - Assess for incontinence   - Turn and reposition patient  - Assist with mobility/ambulation  - Relieve pressure over bony prominences  - Avoid friction and shearing  - Provide appropriate hygiene as needed including keeping skin clean and dry  - Evaluate need for skin moisturizer/barrier cream  - Collaborate with interdisciplinary team   - Patient/family teaching  - Consider wound care consult   Outcome: Progressing   - Apply yellow socks and bracelet for high fall risk patients  - Consider moving patient to room near nurses station  Outcome: Progressing

## 2022-03-03 NOTE — OCCUPATIONAL THERAPY NOTE
OT TREATMENT     03/03/22 1020   Note Type   Note Type Treatment   Restrictions/Precautions   LUE Weight Bearing Per Order NWB   Braces or Orthoses Sling  (LUE abductor brace)   Other Precautions Fall Risk;Pain   Pain Assessment   Pain Assessment Tool 0-10   Pain Score 7   Pain Location/Orientation Orientation: Left; Location: Shoulder   ADL   Eating Assistance 5  Supervision/Setup   Eating Deficit Beverage management   Grooming Assistance 5  Supervision/Setup   Grooming Deficit Brushing hair   Grooming Comments seated   Toileting Assistance  1  Total Assistance   Toileting Deficit Perineal hygiene   Toileting Comments urination on commode    Bed Mobility   Supine to Sit 3  Moderate assistance   Additional items Assist x 1   Transfers   Sit to Stand 3  Moderate assistance   Additional items Assist x 1;Verbal cues   Stand to Sit 3  Moderate assistance   Additional items Assist x 1;Verbal cues   Toilet transfer 3  Moderate assistance   Additional items Commode   Functional Mobility   Functional Mobility   (mod assist of 1, min assist of 1)   Additional Comments few steps to commode and then to chair    Additional items Hemiwalker   ROM- Right Upper Extremities   R Shoulder AROM; Flexion; Horizontal ABduction   R Elbow AROM;Elbow flexion;Elbow extension   R Hand AROM; Thumb; Index finger; Long finger;Ring finger;Little finger   R Weight/Reps/Sets 10 times each seated in chair    Assessment   Assessment Patient seen for OT treatment  Patient requires max assist for toileting  patient able to comb hair with setup  Patient is generally weak and deconditioned and unsteady gait  Patient will benefit from STR  The patient's raw score on the AM-PAC Daily Activity inpatient short form low function score is 18, standardized score is Low Function Daily Activity Standardized Score: 30 17  Patients with a standardized score less than 39 4 are likely to benefit from discharge to post-acute rehab services   Please refer to the recommendation of the Occupational Therapist for safe discharge planning  Plan   Treatment Interventions ADL retraining;Functional transfer training;UE strengthening/ROM; Endurance training;Patient/family training;Equipment evaluation/education; Activityengagement; Compensatory technique education   OT Frequency 5x/wk   Recommendation   OT Discharge Recommendation Post acute rehabilitation services   AM-PAC Daily Activity Inpatient   Lower Body Dressing 1   Bathing 1   Toileting 1   Upper Body Dressing 1   Grooming 3   Eating 3   Daily Activity Raw Score 10   Turning Head Towards Sound 4   Follow Simple Instructions 4   Low Function Daily Activity Raw Score 18   Low Function Daily Activity Standardized Score 30 17   AM-PAC Applied Cognition Inpatient   Following a Speech/Presentation 4   Understanding Ordinary Conversation 4   Taking Medications 4   Remembering Where Things Are Placed or Put Away 4   Remembering List of 4-5 Errands 4   Taking Care of Complicated Tasks 4   Applied Cognition Raw Score 24   Applied Cognition Standardized Score 32 82   Licensure   [place] License Number  Kaitjasper Rogelannmiriam Jack Chacho 87 OTR/L [id number]

## 2022-03-04 PROBLEM — K59.00 CONSTIPATION: Status: ACTIVE | Noted: 2022-03-04

## 2022-03-04 LAB
ABO GROUP BLD: NORMAL
ANION GAP SERPL CALCULATED.3IONS-SCNC: 8 MMOL/L (ref 4–13)
BASOPHILS # BLD AUTO: 0.04 THOUSANDS/ΜL (ref 0–0.1)
BASOPHILS NFR BLD AUTO: 0 % (ref 0–1)
BLD GP AB SCN SERPL QL: NEGATIVE
BUN SERPL-MCNC: 47 MG/DL (ref 5–25)
CALCIUM SERPL-MCNC: 6.9 MG/DL (ref 8.3–10.1)
CHLORIDE SERPL-SCNC: 104 MMOL/L (ref 100–108)
CO2 SERPL-SCNC: 27 MMOL/L (ref 21–32)
CREAT SERPL-MCNC: 2.3 MG/DL (ref 0.6–1.3)
EOSINOPHIL # BLD AUTO: 0.39 THOUSAND/ΜL (ref 0–0.61)
EOSINOPHIL NFR BLD AUTO: 4 % (ref 0–6)
ERYTHROCYTE [DISTWIDTH] IN BLOOD BY AUTOMATED COUNT: 16.5 % (ref 11.6–15.1)
GFR SERPL CREATININE-BSD FRML MDRD: 21 ML/MIN/1.73SQ M
GLUCOSE SERPL-MCNC: 159 MG/DL (ref 65–140)
GLUCOSE SERPL-MCNC: 74 MG/DL (ref 65–140)
GLUCOSE SERPL-MCNC: 84 MG/DL (ref 65–140)
GLUCOSE SERPL-MCNC: 86 MG/DL (ref 65–140)
GLUCOSE SERPL-MCNC: 99 MG/DL (ref 65–140)
HCT VFR BLD AUTO: 23.2 % (ref 34.8–46.1)
HGB BLD-MCNC: 6.8 G/DL (ref 11.5–15.4)
IMM GRANULOCYTES # BLD AUTO: 0.12 THOUSAND/UL (ref 0–0.2)
IMM GRANULOCYTES NFR BLD AUTO: 1 % (ref 0–2)
LYMPHOCYTES # BLD AUTO: 1.89 THOUSANDS/ΜL (ref 0.6–4.47)
LYMPHOCYTES NFR BLD AUTO: 18 % (ref 14–44)
MCH RBC QN AUTO: 28.4 PG (ref 26.8–34.3)
MCHC RBC AUTO-ENTMCNC: 28.9 G/DL (ref 31.4–37.4)
MCV RBC AUTO: 98 FL (ref 82–98)
MONOCYTES # BLD AUTO: 1.43 THOUSAND/ΜL (ref 0.17–1.22)
MONOCYTES NFR BLD AUTO: 13 % (ref 4–12)
NEUTROPHILS # BLD AUTO: 6.81 THOUSANDS/ΜL (ref 1.85–7.62)
NEUTS SEG NFR BLD AUTO: 64 % (ref 43–75)
NRBC BLD AUTO-RTO: 0 /100 WBCS
PLATELET # BLD AUTO: 263 THOUSANDS/UL (ref 149–390)
PMV BLD AUTO: 9.6 FL (ref 8.9–12.7)
POTASSIUM SERPL-SCNC: 4.1 MMOL/L (ref 3.5–5.3)
RBC # BLD AUTO: 2.36 MILLION/UL (ref 3.81–5.12)
RH BLD: NEGATIVE
SODIUM SERPL-SCNC: 139 MMOL/L (ref 136–145)
SPECIMEN EXPIRATION DATE: NORMAL
WBC # BLD AUTO: 10.68 THOUSAND/UL (ref 4.31–10.16)

## 2022-03-04 PROCEDURE — 99223 1ST HOSP IP/OBS HIGH 75: CPT | Performed by: INTERNAL MEDICINE

## 2022-03-04 PROCEDURE — 86900 BLOOD TYPING SEROLOGIC ABO: CPT

## 2022-03-04 PROCEDURE — 86920 COMPATIBILITY TEST SPIN: CPT

## 2022-03-04 PROCEDURE — 97110 THERAPEUTIC EXERCISES: CPT

## 2022-03-04 PROCEDURE — 82948 REAGENT STRIP/BLOOD GLUCOSE: CPT

## 2022-03-04 PROCEDURE — 99024 POSTOP FOLLOW-UP VISIT: CPT | Performed by: PHYSICIAN ASSISTANT

## 2022-03-04 PROCEDURE — 86901 BLOOD TYPING SEROLOGIC RH(D): CPT

## 2022-03-04 PROCEDURE — 80048 BASIC METABOLIC PNL TOTAL CA: CPT | Performed by: INTERNAL MEDICINE

## 2022-03-04 PROCEDURE — 99232 SBSQ HOSP IP/OBS MODERATE 35: CPT | Performed by: INTERNAL MEDICINE

## 2022-03-04 PROCEDURE — 30233N1 TRANSFUSION OF NONAUTOLOGOUS RED BLOOD CELLS INTO PERIPHERAL VEIN, PERCUTANEOUS APPROACH: ICD-10-PCS | Performed by: ORTHOPAEDIC SURGERY

## 2022-03-04 PROCEDURE — 86850 RBC ANTIBODY SCREEN: CPT

## 2022-03-04 PROCEDURE — 85025 COMPLETE CBC W/AUTO DIFF WBC: CPT | Performed by: INTERNAL MEDICINE

## 2022-03-04 PROCEDURE — P9016 RBC LEUKOCYTES REDUCED: HCPCS

## 2022-03-04 RX ORDER — ALBUMIN (HUMAN) 12.5 G/50ML
25 SOLUTION INTRAVENOUS ONCE
Status: COMPLETED | OUTPATIENT
Start: 2022-03-04 | End: 2022-03-04

## 2022-03-04 RX ORDER — INSULIN GLARGINE 100 [IU]/ML
30 INJECTION, SOLUTION SUBCUTANEOUS ONCE
Status: COMPLETED | OUTPATIENT
Start: 2022-03-04 | End: 2022-03-04

## 2022-03-04 RX ORDER — BISACODYL 10 MG
10 SUPPOSITORY, RECTAL RECTAL DAILY PRN
Status: DISCONTINUED | OUTPATIENT
Start: 2022-03-04 | End: 2022-03-07 | Stop reason: HOSPADM

## 2022-03-04 RX ORDER — AMLODIPINE BESYLATE 5 MG/1
5 TABLET ORAL DAILY
Status: DISCONTINUED | OUTPATIENT
Start: 2022-03-05 | End: 2022-03-07 | Stop reason: HOSPADM

## 2022-03-04 RX ADMIN — INSULIN LISPRO 5 UNITS: 100 INJECTION, SOLUTION INTRAVENOUS; SUBCUTANEOUS at 12:50

## 2022-03-04 RX ADMIN — INSULIN LISPRO 2 UNITS: 100 INJECTION, SOLUTION INTRAVENOUS; SUBCUTANEOUS at 12:50

## 2022-03-04 RX ADMIN — PRAVASTATIN SODIUM 80 MG: 80 TABLET ORAL at 17:56

## 2022-03-04 RX ADMIN — MONTELUKAST 10 MG: 10 TABLET, FILM COATED ORAL at 22:30

## 2022-03-04 RX ADMIN — SENNOSIDES 8.6 MG: 8.6 TABLET, FILM COATED ORAL at 22:30

## 2022-03-04 RX ADMIN — INSULIN GLARGINE 30 UNITS: 100 INJECTION, SOLUTION SUBCUTANEOUS at 22:42

## 2022-03-04 RX ADMIN — PREGABALIN 100 MG: 100 CAPSULE ORAL at 17:55

## 2022-03-04 RX ADMIN — ALBUTEROL SULFATE 2 PUFF: 90 AEROSOL, METERED RESPIRATORY (INHALATION) at 18:43

## 2022-03-04 RX ADMIN — INSULIN GLARGINE 40 UNITS: 100 INJECTION, SOLUTION SUBCUTANEOUS at 08:53

## 2022-03-04 RX ADMIN — NYSTATIN: 100000 POWDER TOPICAL at 09:56

## 2022-03-04 RX ADMIN — DOCUSATE SODIUM 100 MG: 100 CAPSULE ORAL at 08:54

## 2022-03-04 RX ADMIN — METOPROLOL TARTRATE 50 MG: 50 TABLET, FILM COATED ORAL at 08:54

## 2022-03-04 RX ADMIN — METOPROLOL TARTRATE 50 MG: 50 TABLET, FILM COATED ORAL at 22:30

## 2022-03-04 RX ADMIN — LORATADINE 10 MG: 10 TABLET ORAL at 08:54

## 2022-03-04 RX ADMIN — HYDROCODONE BITARTRATE AND ACETAMINOPHEN 1 TABLET: 5; 325 TABLET ORAL at 18:42

## 2022-03-04 RX ADMIN — PREGABALIN 100 MG: 100 CAPSULE ORAL at 08:54

## 2022-03-04 RX ADMIN — NYSTATIN: 100000 POWDER TOPICAL at 17:57

## 2022-03-04 RX ADMIN — HYDROCODONE BITARTRATE AND ACETAMINOPHEN 1 TABLET: 5; 325 TABLET ORAL at 10:49

## 2022-03-04 RX ADMIN — APIXABAN 5 MG: 5 TABLET, FILM COATED ORAL at 08:54

## 2022-03-04 RX ADMIN — AMLODIPINE BESYLATE 5 MG: 5 TABLET ORAL at 08:54

## 2022-03-04 RX ADMIN — DOCUSATE SODIUM 100 MG: 100 CAPSULE ORAL at 17:55

## 2022-03-04 RX ADMIN — ALBUMIN (HUMAN) 25 G: 0.25 INJECTION, SOLUTION INTRAVENOUS at 16:28

## 2022-03-04 NOTE — OCCUPATIONAL THERAPY NOTE
OT TREATMENT     03/04/22 1452   Note Type   Note Type Cancelled Session   Cancel Reasons Medical status  (pt getting blood)   Oracio Jean Baptiste MS OTR/L [id number]

## 2022-03-04 NOTE — ASSESSMENT & PLAN NOTE
Patient has known history of paroxysmal l atrial fibrillation patient currently in sinus rhythm  Continue metoprolol 50 milligram p o  B i d   And anticoagulation with Eliquis  Patient follows up with Dr Vernell Allan  Patient continues to remain in sinus rhythm  Eliquis was held in the setting of anemia and hemoglobin dropped

## 2022-03-04 NOTE — ASSESSMENT & PLAN NOTE
Potassium level upon admission was 5 7 which increased to 6 6  Patient not on any medications that can cause hyperkalemia  Patient will be placed on low-potassium diet  Patient was given insulin with D10, nebulizer treatment and Kayexalate 30 grams this morning  Potassium level improved to 5 6 and was given another dose of Kayexalate with improved potassium to 5 4 today  Patient was previously on torsemide  Patient received another dose of Kayexalate with improved potassium level  Continue low-potassium diet

## 2022-03-04 NOTE — PROGRESS NOTES
Progress Note - Cardiology   [facility]     Chun Gilmore 73 y o  female MRN: [id number]  : 1950  Unit/Bed#: [address] [place] [address] Encounter: [id number]    Assessment and Plan:   1  Acute kidney injury with BUN creatinine elevated  On gentle hydration  Avoid nephrotoxin drugs  Management as per medical team      2  Paroxysmal atrial fibrillation currently in sinus  EKG reviewed continue beta-blockers  He is also on Eliquis  Her hemoglobin has dropped  It is okay to hold Eliquis for 2-3 days      3  Obesity with history of sleep apnea  Management as per Anesthesiology     4  Essential hypertension  Blood pressure is acceptable continue amlodipine and beta-blocker     5  Anemia  Etiology not clear there may be a component of acute blood loss as well as anemia of chronic disease she is having acute kidney injury  She is getting blood transfusion  Okay to hold Eliquis if desired by Medicine and Orthopedics  6  Close fracture of head of left humerus  Status post surgery management as per orthopedics      7  Diabetes mellitus  Monitor blood sugars HbA1c 8 2      8  Dyslipidemia on statins  Continue statins    Plan  Continue current cardiac medication  Management of acute kidney injury as per Medicine  She is getting blood transfusion for her says drop in hemoglobin  Okay to hold Eliquis if desired by Medicine and Orthopedics  We can hold it for 2-3 days      Subjective / Objective:   Patient seen and evaluated  No new complaints  No cardiovascular issues heart rate remains stable  She is maintaining sinus rhythm but dropped her hemoglobin  Her kidney function remains elevated  Medicine and orthopedics note noted  No issues during surgery    Vitals: Blood pressure 115/65, pulse 80, temperature 98 1 °F (36 7 °C), temperature source Oral, resp  rate 16, height 5' 2 01" (1 575 m), weight 127 kg (279 lb 15 8 oz), SpO2 98 %, not currently breastfeeding    Vitals:    22 03/03/22 0758   Weight: 127 kg (279 lb 15 8 oz) 127 kg (279 lb 15 8 oz)     Body mass index is 51 2 kg/m²  BP Readings from Last 3 Encounters:   03/04/22 115/65   02/24/22 122/74   02/23/22 121/69     Orthostatic Blood Pressures      Most Recent Value   Blood Pressure 115/65 filed at 03/04/2022 0857   Patient Position - Orthostatic VS Lying filed at 03/04/2022 0712        I/O       03/02 0701  03/03 0700 03/03 0701  03/04 0700 03/04 0701  03/05 0700    P  O        I V  (mL/kg)       Total Intake(mL/kg)       Urine (mL/kg/hr)  950 (0 3)     Emesis/NG output       Stool       Blood       Total Output  950     Net  -950            Unmeasured Urine Occurrence 4 x 1 x         Invasive Devices  Report    Peripheral Intravenous Line            Peripheral IV Dorsal (posterior); Right Forearm -- days                  Intake/Output Summary (Last 24 hours) at 3/4/2022 1039  Last data filed at 3/4/2022 0300  Gross per 24 hour   Intake --   Output 950 ml   Net -950 ml         Physical Exam:   Physical Exam    Neurologic:  Alert & oriented x 3,  no focal deficits noted   Constitutional:  Well developed, well nourished,  comfortably lying flat  Eyes:  PERRL, conjunctiva normal   HENT:  Atraumatic, external ears normal, nose normal,   NECK: Normal range of motion, no tenderness, neck is supple , No JVP  Respiratory:  Bilateral air entry mostly clear to auscultation  Cardiovascular: S1-S2 regular with a 2/6 ejection systolic murmur  S4 is heard  GI:  Soft, nondistended, normal bowel sounds, nontender, no hepatosplenomegaly appreciated  Musculoskeletal:  Left shoulder is in dressing  Some ecchymosis noted    Sensations are intact   Extremities:  No lower extremity edema  Psychiatric:  Speech and behavior appropriate             Medications/ Allergies:     Current Facility-Administered Medications   Medication Dose Route Frequency Provider Last Rate    acetaminophen  650 mg Oral Q6H PRN Jesús Dhillon PA-C      albuterol  2 puff Inhalation Q6H PRN Mittie Limb, PA-C      amLODIPine  5 mg Oral Daily Mittie Limb, PA-C      apixaban  5 mg Oral BID Mittie Limb, PA-C      docusate sodium  100 mg Oral BID Mittie Limb, PA-C      HYDROcodone-acetaminophen  1 tablet Oral Q4H PRN Shawna Caraballo MD      insulin glargine  40 Units Subcutaneous Q12H [address] Jovany Solares MD      insulin lispro  2-12 Units Subcutaneous TID AC Davmarti Bergamo, PA-C      insulin lispro  5 Units Subcutaneous TID With Meals Jovany Solares MD      loratadine  10 mg Oral Daily Mittie Limb, PA-C      metoprolol tartrate  50 mg Oral Q12H [address] Mittie Limb, PA-C      montelukast  10 mg Oral HS Mittie Limb, PA-C      nystatin   Topical BID Mittie Limb, PA-C      ondansetron  4 mg Intravenous Q6H PRN Mittie Limb, PA-C      polyethylene glycol  17 g Oral Daily PRN Jovany Solares MD      pravastatin  80 mg Oral Daily With Zuleyma Gonzalez, PA-C      pregabalin  100 mg Oral BID Mittie Limb, PA-C      senna  1 tablet Oral HS Jovany Solares MD      sodium chloride  75 mL/hr Intravenous Continuous Georgi Dye MD 75 mL/hr (03/03/22 1151)     acetaminophen, 650 mg, Q6H PRN  albuterol, 2 puff, Q6H PRN  HYDROcodone-acetaminophen, 1 tablet, Q4H PRN  ondansetron, 4 mg, Q6H PRN  polyethylene glycol, 17 g, Daily PRN      Allergies   Allergen Reactions    Penicillins Hives    Moxifloxacin Other (See Comments)     unknown    Percocet [Oxycodone-Acetaminophen] Other (See Comments)     unknown    Zinc Acetate Other (See Comments)     unknown    Nuts - Food Allergy Other (See Comments)    Asa [Aspirin] GI Intolerance    Indocin [Indomethacin] Other (See Comments)     Made patient "loopy"    Other Other (See Comments)     unknown       VTE Pharmacologic Prophylaxis:   Eliquis    Labs:   Troponins:        CBC with diff:  Results from last 7 days   Lab Units 03/04/22  0515 03/02/22  0551   WBC Thousand/uL 10 68* 11 72*   HEMOGLOBIN g/dL 6 8* 8 1*   HEMATOCRIT % 23 2* 27 7*   MCV fL 98 99*   PLATELETS Thousands/uL 263 271   MCH pg 28 4 28 8   MCHC g/dL 28 9* 29 2*   RDW % 16 5* 16 8*   MPV fL 9 6 10 1   NRBC AUTO /100 WBCs 0  --        CMP:  Results from last 7 days   Lab Units 22  0515 22  0922 22  1439 22  0551 22  1246   SODIUM mmol/L 139 136 137 138 139   POTASSIUM mmol/L 4 1 5 6* 5 8* 6 6* 5 7*   CHLORIDE mmol/L 104 104 104 106 107   CO2 mmol/L 27 24 26 27 28   ANION GAP mmol/L 8 8 7 5 4   BUN mg/dL 47* 39* 29* 26* 24   CREATININE mg/dL 2 30* 2 16* 1 80* 1 31* 1 03   GLUCOSE FASTING mg/dL  --   --   --  128* 74   CALCIUM mg/dL 6 9* 7 1* 7 5* 7 6* 8 4   AST U/L  --   --   --   --  23   ALT U/L  --   --   --   --  23   ALK PHOS U/L  --   --   --   --  69   TOTAL PROTEIN g/dL  --   --   --   --  6 4   ALBUMIN g/dL  --   --   --   --  2 8*   TOTAL BILIRUBIN mg/dL  --   --   --   --  0 77   EGFR ml/min/1 73sq m 21 22 28 41 55       Coags:  Results from last 7 days   Lab Units 22  0009   PTT seconds 31     Hgb A1c:  Results from last 7 days   Lab Units 22  0009   HEMOGLOBIN A1C % 8 2*         Imaging & Testing   I have personally reviewed pertinent reports  EKG / Monitor: Personally reviewed  Admission EKG shows sinus rhythm with no significant abnormality currently also she has regular rhythm      Cardiac testing:   Results for orders placed during the hospital encounter of 19    Echo complete with contrast if indicated    Todd Llamas [address]  [address] [facility]   [phone]    Transthoracic Echocardiogram  2D, M-mode, Doppler, and Color Doppler    Study date:  29-May-2019    Patient: Ilda Montez  MR number: [id number]  Account number: [de-identified]  : 1950  Age: 79 years  Gender: Female  Status: Inpatient  Location: Bedside  Height: 62 in  Weight: 250 6 lb  BP: 133/ 62 mmHg    Indications: Tachycardia    Diagnoses: I11 9 - Hypertensive heart disease without heart failure    Sonographer:  QUETA Cornelius  Referring Physician:  Kd Hawley MD  Group:  [facility]  Interpreting Physician:  DO GRUPO Meyers    LEFT VENTRICLE:  Systolic function was normal by visual assessment  Ejection fraction was estimated to be 55 %  There were no regional wall motion abnormalities  Wall thickness was mildly to moderately increased  Doppler parameters were consistent with abnormal left ventricular relaxation (grade 1 diastolic dysfunction)  MITRAL VALVE:  There was mild regurgitation  AORTIC VALVE:  There was no evidence for stenosis  There was no regurgitation  TRICUSPID VALVE:  There was mild regurgitation  Pulmonary artery systolic pressure was within the normal range  HISTORY: PRIOR HISTORY: Diabetes,Diabetes, HTN, Hyperlipidemia, A Fib  PROCEDURE: The procedure was performed at the bedside  This was a routine study  The transthoracic approach was used  The study included complete 2D imaging, M-mode, complete spectral Doppler, and color Doppler  The heart rate was 77 bpm,  at the start of the study  Images were obtained from the parasternal, apical, subcostal, and suprasternal notch acoustic windows  Echocardiographic views were limited due to restricted patient mobility, poor patient compliance, poor  acoustic window availability, decreased penetration, and lung interference  This was a technically difficult study  LEFT VENTRICLE: Size was normal  Systolic function was normal by visual assessment  Ejection fraction was estimated to be 55 %  There were no regional wall motion abnormalities  Wall thickness was mildly to moderately increased  DOPPLER:  Doppler parameters were consistent with abnormal left ventricular relaxation (grade 1 diastolic dysfunction)      RIGHT VENTRICLE: The size was normal  Systolic function was normal     LEFT ATRIUM: The atrium was mildly dilated  RIGHT ATRIUM: Size was normal     MITRAL VALVE: Valve structure was normal  There was normal leaflet separation  No echocardiographic evidence for prolapse  DOPPLER: The transmitral velocity was within the normal range  There was no evidence for stenosis  There was mild  regurgitation  AORTIC VALVE: The valve was trileaflet  Leaflets exhibited normal thickness, normal cuspal separation, and sclerosis  DOPPLER: Transaortic velocity was within the normal range  There was no evidence for stenosis  There was no  regurgitation  TRICUSPID VALVE: The valve structure was normal  There was normal leaflet separation  DOPPLER: The transtricuspid velocity was within the normal range  There was mild regurgitation  Pulmonary artery systolic pressure was within the normal  range  Estimated peak PA pressure was 32 mmHg  PULMONIC VALVE: Leaflets exhibited normal thickness, no calcification, and normal cuspal separation  DOPPLER: The transpulmonic velocity was within the normal range  There was no regurgitation  PERICARDIUM: There was no thickening  There was no pericardial effusion  AORTA: The root exhibited normal size  PULMONARY ARTERY: The size was normal  The morphology appeared normal     SYSTEM MEASUREMENT TABLES    2D mode  AoR Diam 2D: 3 6 cm  LA Diam (2D): 3 9 cm  LA/Ao (2D): 1 08  FS (2D Teich): 26 9 %  IVSd (2D): 1 29 cm  LVDEV: 75 1 cmï¾³  LVESV: 35 3 cmï¾³  LVIDd(2D): 4 12 cm  LVISd (2D): 3 01 cm  LVOT Area 2D: 3 14 cmï¾²  LVPWd (2D): 1 2 cm  SV (Teich): 39 8 cmï¾³    Apical four chamber  LVEF A4C: 51 %    Unspecified Scan Mode  HANNA Cont Eq (Peak Gutierrez): 2 58 cmï¾²  LVOT Diam : 2 cm  LVOT Vmax: 963 mm/s  LVOT Vmax; Mean: 963 mm/s  Peak Grad ; Mean: 4 mm[Hg]  MV Peak A Gutierrez: 893 mm/s  MV Peak E Gutierrez   Mean: 805 mm/s  MVA (PHT): 3 67 cmï¾²  PHT: 60 ms  Max P mm[Hg]  V Max: 2360 mm/s  Vmax: 2430 mm/s  RA Area: 12 8 cmï¾²  RA Volume: 27 6 cmï¾³  TAPSE: 2 cm    [employer] Accredited Echocardiography Laboratory    Prepared and electronically signed by    Duncan Metz DO  Signed 29-May-2019 16:19:07      Results for orders placed during the hospital encounter of 19    NM Myocardial Perfusion Spect (Pharmacological Induced Stress and/or Rest)    [address]   [phone]    Rest/Stress Gated SPECT Myocardial Perfusion Imaging After Regadenoson    Patient: Alex Rich  MR number: [id number]  Account number: [de-identified]  : 1950  Age: 81 years  Gender: Female  Status: Inpatient  Location: Stress lab  Height: 62 in  Weight: 252 lb  BP: 149/ 72 mmHg    Allergies: ASPIRIN, INDOMETHACIN, PENICILLINS, OXYCODONE-ACETAMINOPHEN    Diagnosis: R00 1 - Bradycardia, unspecified, R55  - Syncope and collapse    Primary Physician:  Angelic Nunez  RN:  LOYD Yang  Group:  [facility]  Interpreting Physician:  Chepe Pandey MD    INDICATIONS: Evaluation for coronary artery disease  HISTORY: The patient is a 81year old  female  Chest pain status: no chest pain  Other symptoms: syncope with collapse  Coronary artery disease risk factors: family history of premature coronary artery disease and diabetes  mellitus  Cardiovascular history: arrhythmia  Co-morbidity: history of renal disease and obesity  Medications: an anticoagulant, a beta blocker and diabetic medications  PHYSICAL EXAM: Baseline physical exam screening: no wheezes audible  REST ECG: Sinus bradycardia  PROCEDURE: The study was performed in the the Stress lab  A regadenoson infusion pharmacologic stress test was performed  Gated SPECT myocardial perfusion imaging was performed after stress and at rest  Systolic blood pressure was 149  mmHg, at the start of the study  Diastolic blood pressure was 72 mmHg, at the start of the study  The heart rate was 58 bpm, at the start of the study   IV double checked  Regadenoson protocol:  Time HR bpm SBP mmHg DBP mmHg Symptoms Rhythm/conduct  Baseline 12:21 58 149 72 none sinus kenney  Immediate 12:23 112 146 65 mild dyspnea sinus tach  1 min 12:24 115 167 78 nausea same as above  2 min 12:25 109 177 80 subsiding --  3 min 12:26 102 170 77 none --  No medications or fluids given  STRESS SUMMARY: Duration of pharmacologic stress was 3 min  Maximal heart rate during stress was 116 bpm  The heart rate response to stress was normal  There was resting hypertension with an appropriate blood pressure response to stress  The rate-pressure product for the peak heart rate and blood pressure was 83941  There was no chest pain during stress  The stress test was terminated due to protocol completion  Pre oxygen saturation: 100 %  Peak oxygen saturation: 100 %  The stress ECG was negative for ischemia and normal  There were no stress arrhythmias or conduction abnormalities  ISOTOPE ADMINISTRATION:  Resting isotope administration Stress isotope administration  Agent Tetrofosmin Tetrofosmin  Dose 15 2 mCi 454 2 mCi  Date 07/31/2019 --  Injection time 10:35 12:20    The radiopharmaceutical was injected at the peak effect of pharmacologic stress  MYOCARDIAL PERFUSION IMAGING:  Left ventricular size was normal  The TID ratio was 0 99  PERFUSION DEFECTS:  -  There was a small, mildly severe, fixed myocardial perfusion defect of the apical apical and anterior wall likely due to attenuation from breast tissue  GATED SPECT:  The calculated left ventricular ejection fraction was 74 %  Left ventricular ejection fraction was within normal limits by visual estimate  There was no left ventricular regional abnormality  SUMMARY:  -  Stress results: There was resting hypertension with an appropriate blood pressure response to stress  There was no chest pain during stress  -  ECG conclusions: The stress ECG was negative for ischemia and normal   -  Perfusion imaging:  There was a small, mildly severe, fixed myocardial perfusion defect of the apical apical and anterior wall likely due to attenuation from breast tissue   -  Gated SPECT: The calculated left ventricular ejection fraction was 74 %  Left ventricular ejection fraction was within normal limits by visual estimate  There was no left ventricular regional abnormality  IMPRESSIONS: Probably normal study after pharmacologic vasodilation  mils apical defect, most likely due to body attenuation artifact  There was image artifact, without diagnostic evidence for perfusion abnormality  Left ventricular  systolic function was normal     Prepared and signed by    Shy Love MD  Signed 07/31/2019 15:24:47      Dr Shy Love MD [facility] [place]      "This note has been constructed using a voice recognition system  Therefore there may be syntax, spelling, and/or grammatical errors   Please call if you have any questions  "

## 2022-03-04 NOTE — QUICK NOTE
Pt hemoglobin 6 8 on CBC this AM  Consent obtained and ordered 1 unit PRBCs  Patient does not report any new lightheadedness, dizziness, palpitations, SOB, fatigue

## 2022-03-04 NOTE — PLAN OF CARE
Problem: MOBILITY - ADULT  Goal: Maintain or return to baseline ADL function  Description: INTERVENTIONS:  -  Assess patient's ability to carry out ADLs; assess patient's baseline for ADL function and identify physical deficits which impact ability to perform ADLs (bathing, care of mouth/teeth, toileting, grooming, dressing, etc )  - Assess/evaluate cause of self-care deficits   - Assess range of motion  - Assess patient's mobility; develop plan if impaired  - Assess patient's need for assistive devices and provide as appropriate  - Encourage maximum independence but intervene and supervise when necessary  - Involve family in performance of ADLs  - Assess for home care needs following discharge   - Consider OT consult to assist with ADL evaluation and planning for discharge  - Provide patient education as appropriate  Outcome: Progressing  Goal: Maintains/Returns to pre admission functional level  Description: INTERVENTIONS:  - Perform BMAT or MOVE assessment daily    - Set and communicate daily mobility goal to care team and patient/family/caregiver  - Collaborate with rehabilitation services on mobility goals if consulted  - Perform Range of Motion 3 times a day  - Reposition patient every 3 hours    - Dangle patient 3 times a day  - Stand patient 3 times a day  - Ambulate patient 3 times a day  - Out of bed to chair 3 times a day   - Out of bed for meals 3 times a day  - Out of bed for toileting  - Record patient progress and toleration of activity level   Outcome: Progressing     Problem: Prexisting or High Potential for Compromised Skin Integrity  Goal: Skin integrity is maintained or improved  Description: INTERVENTIONS:  - Identify patients at risk for skin breakdown  - Assess and monitor skin integrity  - Assess and monitor nutrition and hydration status  - Monitor labs   - Assess for incontinence   - Turn and reposition patient  - Assist with mobility/ambulation  - Relieve pressure over bony prominences  - Avoid friction and shearing  - Provide appropriate hygiene as needed including keeping skin clean and dry  - Evaluate need for skin moisturizer/barrier cream  - Collaborate with interdisciplinary team   - Patient/family teaching  - Consider wound care consult   Outcome: Progressing     Problem: Potential for Falls  Goal: Patient will remain free of falls  Description: INTERVENTIONS:  - Educate patient/family on patient safety including physical limitations  - Instruct patient to call for assistance with activity   - Consult OT/PT to assist with strengthening/mobility   - Keep Call bell within reach  - Keep bed low and locked with side rails adjusted as appropriate  - Keep care items and personal belongings within reach  - Initiate and maintain comfort rounds  - Make Fall Risk Sign visible to staff  - Offer Toileting every 2 Hours, in advance of need  - Initiate/Maintain bed alarm  - Obtain necessary fall risk management equipment:   - Apply yellow socks and bracelet for high fall risk patients  - Consider moving patient to room near nurses station  Outcome: Progressing     Problem: Nutrition/Hydration-ADULT  Goal: Nutrient/Hydration intake appropriate for improving, restoring or maintaining nutritional needs  Description: Monitor and assess patient's nutrition/hydration status for malnutrition  Collaborate with interdisciplinary team and initiate plan and interventions as ordered  Monitor patient's weight and dietary intake as ordered or per policy  Utilize nutrition screening tool and intervene as necessary  Determine patient's food preferences and provide high-protein, high-caloric foods as appropriate       INTERVENTIONS:  - Monitor oral intake, urinary output, labs, and treatment plans  - Assess nutrition and hydration status and recommend course of action  - Evaluate amount of meals eaten  - Assist patient with eating if necessary   - Allow adequate time for meals  - Recommend/ encourage appropriate diets, oral nutritional supplements, and vitamin/mineral supplements  - Order, calculate, and assess calorie counts as needed  - Recommend, monitor, and adjust tube feedings and TPN/PPN based on assessed needs  - Assess need for intravenous fluids  - Provide specific nutrition/hydration education as appropriate  - Include patient/family/caregiver in decisions related to nutrition  Outcome: Progressing

## 2022-03-04 NOTE — CASE MANAGEMENT
Case Management Discharge Planning Note    Patient name Cristina Fleischer  Location [address] MRN [id number]  : 1950 Date 3/4/2022       Current Admission Date: 3/1/2022  Current Admission Diagnosis:Closed 4-part fracture of proximal humerus, left, initial encounter   Patient Active Problem List    Diagnosis Date Noted    Constipation 2022    Hyperkalemia 2022    Closed 4-part fracture of proximal humerus, left, initial encounter 2022    PONV (postoperative nausea and vomiting) 2022    Acute renal failure superimposed on chronic kidney disease ([facility] ) 2022    Diabetes mellitus, type 2 ([facility] ) 2022    Gastroesophageal reflux disease 2022    Nephrolithiasis 2022    Arthritis 2022    S/P total knee replacement, right 2022    On continuous oral anticoagulation - eliquis 2022    Humeral head fracture 2022    Essential hypertension 2019    Anemia 2019    Mixed hyperlipidemia 2019    Paroxysmal atrial fibrillation ([facility] ) 2019    Lower leg edema 2019    Asthma 2018    Morbid obesity with BMI of 45 0-49 9, adult ([facility] ) 2018      LOS (days): 1  Geometric Mean LOS (GMLOS) (days): 1 40  Days to GMLOS:0 2     OBJECTIVE:  Risk of Unplanned Readmission Score: 22     Current admission status: Inpatient   Preferred Pharmacy:   [address] [place] [address],[address] , [address] [place] [id number]  Phone: [phone] Fax: [phone]    Primary Care Provider: Que Briggs    Primary Insurance: MEDICARE  Secondary Insurance: [employer]    DISCHARGE DETAILS:    Discharge planning discussed with[de-identified] Patient and niece, Sanjay Mealing of Choice: Yes  Comments - Freedom of Choice: SW following to assist with DCP    Pt was admitted from [facility] for shoulder surgery and pt's request is to return to [facility] upon discharge  SW met with pt and her niece to review plans  CM contacted family/caregiver?: Yes (message received from sister earlier today requesting call back, message left for sister)    Contacts  Patient Contacts: Drew Butler  Relationship to Patient[de-identified] Family (niece)  Contact Method: In Person  Reason/Outcome: Discharge [address]         Is the patient interested in [facility] [place] at discharge?: No    DME Referral Provided  Referral made for DME?: No    Other Referral/Resources/Interventions Provided:  Interventions: Short Term Rehab  Referral Comments: [facility] will be accepting pt back upon discharge    Treatment Team Recommendation: Short Term Rehab  Discharge Destination Plan[de-identified] Short Term Rehab  Transport at Discharge : Wheelchair Laban Boards (has bariatric wheelchair from [facility])    IMM Given (Date):: 03/04/22  IMM Given to[de-identified] Patient (Initial IMM reviewed with pt and niece  Pt verbalized understanding  Pt requested that her niece sign form since pt is left handed and she just had left shoulder surgery  Niece signed IMM and copy given to pt    Copy also placed in scan bin for chart )

## 2022-03-04 NOTE — ASSESSMENT & PLAN NOTE
Lab Results   Component Value Date    HGBA1C 8 2 (H) 03/02/2022   Patient can resume Toujeo insulin 40 units b i d   And Humalog 5 units with meals  Can continue Humalog sliding scale  Blood sugars are better after restarting Lantus and Humalog with meals

## 2022-03-04 NOTE — CONSULTS
Consultation - Nephrology   Shaniqua Reinoso 73 y o  female MRN: [id number]  Unit/Bed#: [address] [place] [address] Encounter: [id number]    ASSESSMENT and PLAN:  Acute kidney injury  -Baseline creatinine:  1 1-1 5 mg/dl  -Admission creatinine:  1 03 mg/dl  - Work up:   · UA with microscopy:  Ordered  Previous UA with 1+ protein and 0-1 RBCs  · Imaging:-  -Etiology:  Acute kidney injury likely due to postop ATN  [facility] Course:  Renal function has worsened to creatinine 2 3 today, likely due to progression of ATN as well as due to drop in hemoglobin   -Plan:   · Recommend monitoring hemoglobin per primary team   She does have lower extremity edema so would avoid further IV fluids  Has low serum albumins ordered for 1 dose of IV albumin  check CK level  Check bladder scan  · Discussed with patient about the course of ATN, possibly renal function would continue to worsen before improving  · No indication for renal replacement therapy  · Decreased amlodipine to once a day with hold parameters  · Avoid nephrotoxins and dose all medications per EGFR  · Avoid hypotension  Chronic Kidney Disease Stage 3  -Outpatient Nephrologist: Dr Jimmie Hadley  -Baseline Creatinine: 1 1-1 5  Recent acute kidney injury in February 2022, creatinine improved from peak 3 3 to creatinine 1 6 on 02/20   -Etiology:  Chronic kidney disease likely due to diabetic nephropathy, hypertensive nephrosclerosis, sequelae of SEBASTIAN  -Avoid Nephrotoxins and Dose all medications per eGFR  -Will need outpatient follow up after discharge  BP/hypertension  -blood pressure acceptable, avoid hypotension and so decreasing amlodipine to 5 mg daily with hold parameters  Continue metoprolol    Hyperkalemia:  Potassium was 5 7 on admission  -peak potassium was 6 6, received medical treatment and last potassium was 4 1, continue to monitor  Recommend low-potassium diet    Check CK level    Anemia, hemoglobin has trended down to 6 8, recommend monitoring per primary team and PRBC per primary team    Left humerus fracture, status post left shoulder reverse arthroplasty on 03/01, continue postop care per Orthopedics    Others:  Paroxysmal atrial fibrillation on metoprolol and Eliquis/diabetes mellitus type 2 on insulin management per primary team    Discussed with SLIM  HISTORY OF PRESENT ILLNESS:  Requesting Physician: Fransisco Dyer*  Reason for Consult: SEBASTIAN Sharp is a 73 y o  female with history of chronic kidney disease stage 3, diabetes mellitus type 2, CKD follows with Naida Leong but was last seen in office in 2019 by advanced practitioner, hypertension, chronic diastolic CHF, paroxysmal atrial fibrillation who was admitted to [facility] for  left humerus fracture and underwent left shoulder reverse arthroplasty on 03/01/2022  CT left upper extremity was suggestive of fracture of the left proximal femur on 02/17  -was recently admitted in February status post fall, found to have left proximal humerus fracture  Had acute kidney injury which improved with IV fluids  On presentation on 03/01 creatinine was 1 03, renal function worsened to creatinine 2 3 on 03/04 with stable electrolyte  Hemoglobin is 6 8 g per dL  And has trended down  No hypotensive episode but blood pressure was low during intraop      Currently complaining of lower extremity edema but denies any urinary symptoms    PAST MEDICAL HISTORY:  Past Medical History:   Diagnosis Date    [facility]fib ([facility] )     Anemia     Asthma     Chronic kidney disease     CKD (chronic kidney disease)     Diabetes mellitus (HCC)     GERD (gastroesophageal reflux disease)     Hyperlipidemia     Hypertension     Kidney stones     PONV (postoperative nausea and vomiting)     SVT (supraventricular tachycardia) ([email or web address]        PAST SURGICAL HISTORY:  Past Surgical History:   Procedure Laterality Date    APPENDECTOMY      CYSTOSCOPY W/ LASER LITHOTRIPSY Left 7/12/2016    Procedure: CYSTOSCOPY URETEROSCOPY WITH LITHOTRIPSY HOLMIUM LASER, RETROGRADE PYELOGRAM AND INSERTION STENT URETERAL;  Surgeon: Alize Brumfield MD;  Location: [address] [place] [address];  Service:     DILATION AND CURETTAGE OF UTERUS      JOINT REPLACEMENT      right knee    KNEE ARTHROPLASTY Right     AR CYSTOURETHROSCOPY,URETER CATHETER Left 2016    Procedure: CYSTOSCOPY RETROGRADE PYELOGRAM WITH INSERTION STENT URETERAL, left;  Surgeon: Alize Brumfield MD;  Location: [address] [place] [address];  Service: Urology    AR RECONSTR TOTAL SHOULDER IMPLANT Left 3/1/2022    Procedure: ARTHROPLASTY SHOULDER REVERSE;  Surgeon: Carlos Alberto Steve MD;  Location: [facility];  Service: Orthopedics    SHOULDER ARTHROTOMY Left        SOCIAL HISTORY:  Social History     Substance and Sexual Activity   Alcohol Use Not Currently    Comment: rarely     Social History     Substance and Sexual Activity   Drug Use No     Social History     Tobacco Use   Smoking Status Former Smoker    Packs/day: 1 00    Years: 20 00    Pack years: 20 00    Types: Cigarettes    Quit date: 1996    Years since quittin 6   Smokeless Tobacco Never Used       FAMILY HISTORY:  Family History   Problem Relation Age of Onset    Heart disease Mother     Emphysema Maternal Grandmother     Heart disease Family        ALLERGIES:  Allergies   Allergen Reactions    Penicillins Hives    Moxifloxacin Other (See Comments)     unknown    Percocet [Oxycodone-Acetaminophen] Other (See Comments)     unknown    Zinc Acetate Other (See Comments)     unknown    Nuts - Food Allergy Other (See Comments)    Asa [Aspirin] GI Intolerance    Indocin [Indomethacin] Other (See Comments)     Made patient "loopy"    Other Other (See Comments)     unknown       MEDICATIONS:    Current Facility-Administered Medications:     acetaminophen (TYLENOL) tablet 650 mg, 650 mg, Oral, Q6H PRN, Derian Valladares PA-C, 650 mg at 22 0613    albuterol (PROVENTIL HFA,VENTOLIN HFA) inhaler 2 puff, 2 puff, Inhalation, Q6H PRN, Derian Valladares PA-C    amLODIPine (NORVASC) tablet 5 mg, 5 mg, Oral, Daily, GEE Atkinson-C, 5 mg at 03/04/22 9424    apixaban (ELIQUIS) tablet 5 mg, 5 mg, Oral, BID, Derian Valladares PA-C, 5 mg at 03/04/22 0458    docusate sodium (COLACE) capsule 100 mg, 100 mg, Oral, BID, GEE Atkinson-C, 100 mg at 03/04/22 0854    HYDROcodone-acetaminophen (NORCO) 5-325 mg per tablet 1 tablet, 1 tablet, Oral, Q4H PRN, Carlos Alberto Steve MD, 1 tablet at 03/03/22 2224    insulin glargine (LANTUS) subcutaneous injection 40 Units 0 4 mL, 40 Units, Subcutaneous, Q12H [address], Georgi Dye MD, 40 Units at 03/04/22 0853    insulin lispro (HumaLOG) 100 units/mL subcutaneous injection 2-12 Units, 2-12 Units, Subcutaneous, TID AC, 6 Units at 03/03/22 1729 **AND** Fingerstick Glucose (POCT), , , TID AC, Milind Rubi PA-C    insulin lispro (HumaLOG) 100 units/mL subcutaneous injection 5 Units, 5 Units, Subcutaneous, TID With Meals, Kamala Powers MD, 5 Units at 03/03/22 1729    loratadine (CLARITIN) tablet 10 mg, 10 mg, Oral, Daily, Derian Valladares PA-C, 10 mg at 03/04/22 0854    metoprolol tartrate (LOPRESSOR) tablet 50 mg, 50 mg, Oral, Q12H [address], GEE Atkinson-NAKITA, 50 mg at 03/04/22 0854    montelukast (SINGULAIR) tablet 10 mg, 10 mg, Oral, HS, GEE Atkinson-NAKITA, 10 mg at 03/03/22 2135    nystatin (MYCOSTATIN) powder, , Topical, BID, Derian Valladares PA-C, Given at 03/04/22 9995    ondansetron (ZOFRAN) injection 4 mg, 4 mg, Intravenous, Q6H PRN, Derian Valladares PA-C    polyethylene glycol (MIRALAX) packet 17 g, 17 g, Oral, Daily PRN, Kamala Powers MD, 17 g at 03/03/22 1624    pravastatin (PRAVACHOL) tablet 80 mg, 80 mg, Oral, Daily With Radha Pickett PA-C, 80 mg at 03/03/22 1621    pregabalin (LYRICA) capsule 100 mg, 100 mg, Oral, BID, Derian Valladares PA-C, 100 mg at 03/04/22 0854    senna (SENOKOT) tablet 8 6 mg, 1 tablet, Oral, HS, Ralph Magana MD, 8 6 mg at 03/03/22 2135    sodium chloride 0 9 % infusion, 75 mL/hr, Intravenous, Continuous, Georgi Dye MD, Last Rate: 75 mL/hr at 03/03/22 1151, 75 mL/hr at 03/03/22 1151    REVIEW OF SYSTEMS:   Review of Systems   Constitutional: Negative for activity change, appetite change, chills, diaphoresis, fatigue and fever  HENT: Negative for congestion, facial swelling and nosebleeds  Eyes: Negative for pain and visual disturbance  Respiratory: Negative for cough, chest tightness and shortness of breath  Cardiovascular: Positive for leg swelling  Negative for chest pain and palpitations  Gastrointestinal: Negative for abdominal distention, abdominal pain, diarrhea, nausea and vomiting  Genitourinary: Negative for difficulty urinating, dysuria, flank pain, frequency and hematuria  Musculoskeletal: Negative for arthralgias, back pain and joint swelling  Skin: Negative for rash  Neurological: Negative for dizziness, seizures, syncope, weakness and headaches  Psychiatric/Behavioral: Negative for agitation and confusion  The patient is not nervous/anxious  All the systems were reviewed and were negative except as documented on the HPI  PHYSICAL EXAM:  Current Weight: Weight - Scale: 127 kg (279 lb 15 8 oz)  First Weight: Weight - Scale: 122 kg (268 lb)  Vitals:    03/03/22 2336 03/04/22 0712 03/04/22 0854 03/04/22 0857   BP: 112/75 (!) 132/48 113/68 115/65   BP Location:  Left leg     Pulse: 93 80 82 80   Resp:  16  16   Temp: 98 6 °F (37 °C) 98 1 °F (36 7 °C)     TempSrc:  Oral     SpO2: 98% 95%  98%   Weight:       Height:           Intake/Output Summary (Last 24 hours) at 3/4/2022 1015  Last data filed at 3/4/2022 0300  Gross per 24 hour   Intake --   Output 950 ml   Net -950 ml     Physical Exam  Constitutional:       General: She is not in acute distress  Appearance: Normal appearance  She is well-developed  She is obese     HENT:      Head: Normocephalic and atraumatic  Nose: Nose normal       Mouth/Throat:      Mouth: Mucous membranes are moist    Eyes:      General: No scleral icterus  Conjunctiva/sclera: Conjunctivae normal       Pupils: Pupils are equal, round, and reactive to light  Neck:      Thyroid: No thyromegaly  Vascular: No JVD  Cardiovascular:      Rate and Rhythm: Normal rate and regular rhythm  Heart sounds: Normal heart sounds  No murmur heard  No friction rub  Pulmonary:      Effort: Pulmonary effort is normal  No respiratory distress  Breath sounds: Normal breath sounds  No wheezing or rales  Abdominal:      General: Bowel sounds are normal  There is no distension  Palpations: Abdomen is soft  Tenderness: There is no abdominal tenderness  Musculoskeletal:         General: No deformity  Cervical back: Neck supple  Right lower leg: Edema (trace) present  Left lower leg: Edema (trace) present  Comments: Left arm in sling    Skin:     General: Skin is warm and dry  Findings: No rash  Neurological:      Mental Status: She is alert and oriented to person, place, and time  Psychiatric:         Mood and Affect: Mood normal          Behavior: Behavior normal          Thought Content:  Thought content normal            Invasive Devices:        Lab Results:   Results from last 7 days   Lab Units 03/04/22  0515 03/03/22  0922 03/02/22  1439 03/02/22  0551 03/02/22  0551 03/01/22  1246 03/01/22  1246   WBC Thousand/uL 10 68*  --   --   --  11 72*  --   --    HEMOGLOBIN g/dL 6 8*  --   --   --  8 1*  --   --    HEMATOCRIT % 23 2*  --   --   --  27 7*  --   --    PLATELETS Thousands/uL 263  --   --   --  271  --   --    POTASSIUM mmol/L 4 1 5 6* 5 8*   < > 6 6*   < > 5 7*   CHLORIDE mmol/L 104 104 104   < > 106   < > 107   CO2 mmol/L 27 24 26   < > 27   < > 28   BUN mg/dL 47* 39* 29*   < > 26*   < > 24   CREATININE mg/dL 2 30* 2 16* 1 80*   < > 1 31*   < > 1 03   CALCIUM mg/dL 6 9* 7 1* 7 5* < > 7 6*   < > 8 4   ALK PHOS U/L  --   --   --   --   --   --  69   ALT U/L  --   --   --   --   --   --  23   AST U/L  --   --   --   --   --   --  23    < > = values in this interval not displayed  Other Studies: LEFT SHOULDER     INDICATION:   post op      COMPARISON:  2/23/2022     VIEWS:  XR SHOULDER 1 VW LEFT         FINDINGS:     There is a reverse left shoulder arthroplasty  Components are in anatomic alignment      There are expected postoperative changes      IMPRESSION:     Anatomic alignment of left shoulder reverse arthroplasty      Portions of the record may have been created with voice recognition software  Occasional wrong word or "sound a like" substitutions may have occurred due to the inherent limitations of voice recognition software  Read the chart carefully and recognize, using context, where substitutions have occurred  If you have any questions, please contact the dictating provider

## 2022-03-04 NOTE — ASSESSMENT & PLAN NOTE
Lab Results   Component Value Date    EGFR 21 03/04/2022    EGFR 22 03/03/2022    EGFR 28 03/02/2022    CREATININE 2 30 (H) 03/04/2022    CREATININE 2 16 (H) 03/03/2022    CREATININE 1 80 (H) 03/02/2022   History of CKD stage 3  Baseline creatinine around 1 2-1 4  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    Creatinine continues to get worse postoperatively and has increased to 2 3  PVRs were unremarkable  Avoid nephrotoxic agents and hypotension  Patient received IV hydration and Nephrology was consulted due to worsening creatinine  Etiology felt to be secondary to ATN and drop in hemoglobin  Patient ordered for 1 unit of PRBC transfusion  Fluids were discontinued and patient was ordered for 1 dose of IV albumin  Check CK level

## 2022-03-04 NOTE — PHYSICAL THERAPY NOTE
PT TREATMENT     03/04/22 1430   Note Type   Note Type Treatment   Pain Assessment   Pain Assessment Tool 0-10   Pain Score 5   Pain Location/Orientation Orientation: Left; Location: Shoulder   Restrictions/Precautions   LUE Weight Bearing Per Order NWB   Braces or Orthoses   (abduction sling)   Other Precautions Pain; Fall Risk   General   Chart Reviewed Yes   Additional Pertinent History Pt getting a blood transfusion for hgb 6 8  Pt seen for in bed exercises only  Cognition   Overall Cognitive Status WFL   Arousal/Participation Arousable   Subjective   Subjective 'I'm so tired today"   Activity Tolerance   Activity Tolerance Patient limited by fatigue   Exercises   Neuro re-ed x 20 each ankle pumps, hip IR/ER;  x 10 each SAQ, hip abd/add AAROM, heel slides AAROM;  Cues needed for pt to stay awake during exercises  Assessment   Prognosis Good   Problem List Decreased strength;Decreased endurance; Impaired balance;Decreased mobility;Pain;Orthopedic restrictions; Obesity   Assessment Pt is sleepy today  In bed exercises only due to pt recieving a blood transfusion at the time of treatment  Plan to resume bed mobility, transfer, gait training next session if medically appropriate  The patient's AM-[facility] Basic Mobility Inpatient Short Form Raw Score is 10  A Raw score of less than or equal to 16 suggests the patient may benefit from discharge to post-acute rehabilitation services  Plan   Treatment/Interventions ADL retraining;Functional transfer training;LE strengthening/ROM; Therapeutic exercise;Gait training;Bed mobility; Equipment eval/education;Patient/family training   Progress Progressing toward goals   PT Frequency Other (Comment)  (daily)   Recommendation   PT Discharge Recommendation Post acute rehabilitation services   AM-PAC Basic Mobility Inpatient   Turning in Bed Without Bedrails 2   Lying on Back to Sitting on Edge of Flat Bed 2   Moving Bed to Chair 2   Standing Up From Chair 2   Walk in Room 1   Climb 3-5 Stairs 1   Basic Mobility Inpatient Raw Score 10   Turning Head Towards Sound 3   Follow Simple Instructions 3   Low Function Basic Mobility Raw Score 16   Low Function Basic Mobility Standardized Score 25 72   Highest Level Of Mobility   [facility] Goal 4: Move to chair/commode   [facility] Highest Level of Mobility 2: Bed activities/Dependent transfer   [facility] Goal Achieved No   End of Consult   Patient Position at End of Consult Supine; All needs within reach   [facility] License Number  Bindu Barrow PT [id number]

## 2022-03-04 NOTE — ASSESSMENT & PLAN NOTE
Patient's baseline hemoglobin is between 8 and 9  Hemoglobin today 6 8  Likely secondary to acute blood loss during surgery  Patient ordered for 1 unit of PRBC transfusion  Try to maintain hemoglobin level around 8

## 2022-03-04 NOTE — NURSING NOTE
IV started leaking after the start of the blood transfusion  ED nurse called to start new IV since the patient was a very difficult stick  ED Nurse arrived around [phone] with ultrasound machine and was successfully able to insert new IV line, see flowsheets for details  Patient able to tolerate remaining blood transfusion  VS within baseline, patient exhibited no s/s of transfusion reaction

## 2022-03-04 NOTE — PLAN OF CARE
Problem: MOBILITY - ADULT  Goal: Maintain or return to baseline ADL function  Description: INTERVENTIONS:  -  Assess patient's ability to carry out ADLs; assess patient's baseline for ADL function and identify physical deficits which impact ability to perform ADLs (bathing, care of mouth/teeth, toileting, grooming, dressing, etc )  - Assess/evaluate cause of self-care deficits   - Assess range of motion  - Assess patient's mobility; develop plan if impaired  - Assess patient's need for assistive devices and provide as appropriate  - Encourage maximum independence but intervene and supervise when necessary  - Involve family in performance of ADLs  - Assess for home care needs following discharge   - Consider OT consult to assist with ADL evaluation and planning for discharge  - Provide patient education as appropriate  Outcome: Progressing  Goal: Maintains/Returns to pre admission functional level  Description: INTERVENTIONS:  - Perform BMAT or MOVE assessment daily    - Set and communicate daily mobility goal to care team and patient/family/caregiver  - Collaborate with rehabilitation services on mobility goals if consulted  -   Problem: Nutrition/Hydration-ADULT  Goal: Nutrient/Hydration intake appropriate for improving, restoring or maintaining nutritional needs  Description: Monitor and assess patient's nutrition/hydration status for malnutrition  Collaborate with interdisciplinary team and initiate plan and interventions as ordered  Monitor patient's weight and dietary intake as ordered or per policy  Utilize nutrition screening tool and intervene as necessary  Determine patient's food preferences and provide high-protein, high-caloric foods as appropriate       INTERVENTIONS:  - Monitor oral intake, urinary output, labs, and treatment plans  - Assess nutrition and hydration status and recommend course of action  - Evaluate amount of meals eaten  - Assist patient with eating if necessary   - Allow adequate time for meals  - Recommend/ encourage appropriate diets, oral nutritional supplements, and vitamin/mineral supplements  - Order, calculate, and assess calorie counts as needed  - Recommend, monitor, and adjust tube feedings and TPN/PPN based on assessed needs  - Assess need for intravenous fluids  - Provide specific nutrition/hydration education as appropriate  - Include patient/family/caregiver in decisions related to nutrition  Outcome: Progressing   - Out of bed for toileting  - Record patient progress and toleration of activity level   Outcome: Progressing

## 2022-03-04 NOTE — ASSESSMENT & PLAN NOTE
Patient did not have a bowel movement in few days  Patient is on Colace and MiraLax  Added on Dulcolax suppository

## 2022-03-04 NOTE — PROGRESS NOTES
[place] 47  Progress Note Tenisha Ríos 1950, 73 y o  female MRN: [id number]  Unit/Bed#: [address] [place] [address] Encounter: [id number]  Primary Care Provider: Antwan Ware   Date and time admitted to hospital: 3/1/2022 10:20 AM    Hyperkalemia  Assessment & Plan  Potassium level upon admission was 5 7 which increased to 6 6  Patient not on any medications that can cause hyperkalemia  Patient will be placed on low-potassium diet  Patient was given insulin with D10, nebulizer treatment and Kayexalate 30 grams this morning  Potassium level improved to 5 6 and was given another dose of Kayexalate with improved potassium to 5 4 today  Patient was previously on torsemide  Patient received another dose of Kayexalate with improved potassium level  Continue low-potassium diet    Anemia  Assessment & Plan  Patient's baseline hemoglobin is between 8 and 9  Hemoglobin today 6 8  Likely secondary to acute blood loss during surgery  Patient ordered for 1 unit of PRBC transfusion  Try to maintain hemoglobin level around 8    * Closed 4-part fracture of proximal humerus, left, initial encounter  Assessment & Plan  Patient sustained a left proximal humerus fracture after a fall  Patient underwent left shoulder reverse arthroplasty by Orthopedics POD# 3  Further care per orthopedics  Encourage incentive spirometry, pain management  PT/OT    Acute renal failure superimposed on chronic kidney disease [facility])  Assessment & Plan  Lab Results   Component Value Date    EGFR 21 03/04/2022    EGFR 22 03/03/2022    EGFR 28 03/02/2022    CREATININE 2 30 (H) 03/04/2022    CREATININE 2 16 (H) 03/03/2022    CREATININE 1 80 (H) 03/02/2022   History of CKD stage 3  Baseline creatinine around 1 2-1 4  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    Creatinine continues to get worse postoperatively and has increased to 2 3  PVRs were unremarkable  Avoid nephrotoxic agents and hypotension  Patient received IV hydration and Nephrology was consulted due to worsening creatinine  Etiology felt to be secondary to ATN and drop in hemoglobin  Patient ordered for 1 unit of PRBC transfusion  Fluids were discontinued and patient was ordered for 1 dose of IV albumin  Check CK level    Paroxysmal atrial fibrillation [facility])  Assessment & Plan  Patient has known history of paroxysmal l atrial fibrillation patient currently in sinus rhythm  Continue metoprolol 50 milligram p o  B i d  And anticoagulation with Eliquis  Patient follows up with Dr Edgar Harkins  Patient continues to remain in sinus rhythm  Eliquis was held in the setting of anemia and hemoglobin dropped    Constipation  Assessment & Plan  Patient did not have a bowel movement in few days  Patient is on Colace and MiraLax  Added on Dulcolax suppository    Diabetes mellitus, type 2 [facility])  Assessment & Plan    Lab Results   Component Value Date    HGBA1C 8 2 (H) 03/02/2022   Patient can resume Toujeo insulin 40 units b i d  And Humalog 5 units with meals  Can continue Humalog sliding scale  Blood sugars are better after restarting Lantus and Humalog with meals        VTE Pharmacologic Prophylaxis:   High Risk (Score >/= 5) - Pharmacological DVT Prophylaxis Ordered: apixaban (Eliquis)  Sequential Compression Devices Ordered  -Eliquis held today due to anemia and acute blood    Patient Centered Rounds: I performed bedside rounds with nursing staff today  Discussions with Specialists or Other Care Team Provider: Dr Abdullahi Bryant      Time Spent for Care: 45 minutes  More than 50% of total time spent on counseling and coordination of care as described above  Current Length of Stay: 1 day(s)  Current Patient Status: Inpatient   Certification Statement: The patient will continue to require additional inpatient hospital stay due to Acute kidney injury, anemia  Discharge Plan: Anticipate discharge in 24-48 hrs to rehab facility      Code Status: Level 1 - Full Code    Subjective:   Patient still complaining of some shoulder pain  Patient did not have a bowel movement  Patient denies any chest pain, shortness breast or abdominal pain  Objective:     Vitals:   Temp (24hrs), Av 6 °F (37 °C), Min:97 8 °F (36 6 °C), Max:99 7 °F (37 6 °C)    Temp:  [97 8 °F (36 6 °C)-99 7 °F (37 6 °C)] 98 2 °F (36 8 °C)  HR:  [76-98] 76  Resp:  [15-20] 16  BP: (109-156)/(47-75) 132/57  SpO2:  [95 %-100 %] 99 %  Body mass index is 51 2 kg/m²  Input and Output Summary (last 24 hours): Intake/Output Summary (Last 24 hours) at 3/4/2022 1123  Last data filed at 3/4/2022 0300  Gross per 24 hour   Intake --   Output 250 ml   Net -250 ml       Physical Exam:   Physical Exam  Constitutional:       Appearance: Normal appearance  HENT:      Head: Normocephalic and atraumatic  Nose: Nose normal       Mouth/Throat:      Mouth: Mucous membranes are moist       Pharynx: Oropharynx is clear  Eyes:      Extraocular Movements: Extraocular movements intact  Pupils: Pupils are equal, round, and reactive to light  Cardiovascular:      Rate and Rhythm: Normal rate and regular rhythm  Pulmonary:      Effort: Pulmonary effort is normal       Breath sounds: Normal breath sounds  Abdominal:      General: Bowel sounds are normal  There is no distension  Palpations: Abdomen is soft  Tenderness: There is no abdominal tenderness  Musculoskeletal:         General: No swelling  Cervical back: Normal range of motion and neck supple  Right lower leg: Edema present  Left lower leg: Edema present  Comments: Left shoulder in sling  Bruising noted in the left upper chest wall and left upper arm   Skin:     General: Skin is warm and dry  Neurological:      General: No focal deficit present  Mental Status: She is alert           Additional Data:     Labs:  Results from last 7 days   Lab Units 22  0515   WBC Thousand/uL 10 68*   HEMOGLOBIN g/dL 6 8*   HEMATOCRIT % 23 2*   PLATELETS Thousands/uL 263   NEUTROS PCT % 64   LYMPHS PCT % 18   MONOS PCT % 13*   EOS PCT % 4     Results from last 7 days   Lab Units 03/04/22  0515 03/02/22  0551 03/01/22  1246   SODIUM mmol/L 139   < > 139   POTASSIUM mmol/L 4 1   < > 5 7*   CHLORIDE mmol/L 104   < > 107   CO2 mmol/L 27   < > 28   BUN mg/dL 47*   < > 24   CREATININE mg/dL 2 30*   < > 1 03   ANION GAP mmol/L 8   < > 4   CALCIUM mg/dL 6 9*   < > 8 4   ALBUMIN g/dL  --   --  2 8*   TOTAL BILIRUBIN mg/dL  --   --  0 77   ALK PHOS U/L  --   --  69   ALT U/L  --   --  23   AST U/L  --   --  23   GLUCOSE RANDOM mg/dL 86   < > 74    < > = values in this interval not displayed  Results from last 7 days   Lab Units 03/04/22  1119 03/04/22  0715 03/03/22  2126 03/03/22  2042 03/03/22  1634 03/03/22  1058 03/03/22  0708 03/02/22  2048 03/02/22  1616 03/02/22  1127 03/02/22  0704 03/01/22  2131   POC GLUCOSE mg/dl 159* 84 176* 180* 252* 172* 121 117 145* 465* 126 70     Results from last 7 days   Lab Units 03/02/22  0009   HEMOGLOBIN A1C % 8 2*           Lines/Drains:  Invasive Devices  Report    Peripheral Intravenous Line            Peripheral IV Dorsal (posterior); Right Forearm -- days                      Imaging: Reviewed radiology reports from this admission including: chest xray, left shoulder x-ray    Recent Cultures (last 7 days):         Last 24 Hours Medication List:   Current Facility-Administered Medications   Medication Dose Route Frequency Provider Last Rate    acetaminophen  650 mg Oral Q6H PRN Nic Stephen PA-C      albumin human  25 g Intravenous Once Yobani Trimble MD      albuterol  2 puff Inhalation Q6H PRN Nic Stephen PA-C      [START ON 3/5/2022] amLODIPine  5 mg Oral Daily Yobani Trimble MD      bisacodyl  10 mg Rectal Daily PRN Kirk Thurman MD      docusate sodium  100 mg Oral BID Nic Stephen PA-C      HYDROcodone-acetaminophen  1 tablet Oral Q4H PRN Verner Buoy Lambert Hendrickson MD      insulin glargine  40 Units Subcutaneous Q12H [facility] Georgi Dye MD      insulin lispro  2-12 Units Subcutaneous TID [facility]      insulin lispro  5 Units Subcutaneous TID With Meals Sushila Maya MD      loratadine  10 mg Oral Daily Eyestela Clemons, MICHELLE      metoprolol tartrate  50 mg Oral Q12H [facility] Neal Clemons, MICHELLE      montelukast  10 mg Oral HS Eyestela Clemons PA-C      nystatin   Topical BID Eyestela Clemons PA-C      ondansetron  4 mg Intravenous Q6H PRN Neal Clemons PA-C      polyethylene glycol  17 g Oral Daily PRN Sushila Maya MD      pravastatin  80 mg Oral Daily With America Lawrence PA-C      pregabalin  100 mg Oral BID Neal Clemons, MICHELLE      senna  1 tablet Oral HS Sushila Maya MD          Today, Patient Was Seen By: Sushila Maya MD    **Please Note: This note may have been constructed using a voice recognition system  **

## 2022-03-04 NOTE — PROGRESS NOTES
Progress Note - Orthopedics   Shaniqua Reinoso 73 y o  female MRN: [id number]  Unit/Bed#: [address] [place] [address] Encounter: [id number]      Subjective:  Status post left reverse total shoulder replacement performed 3 days ago  The patient notes some mild to moderate discomfort about the left shoulder, mostly when it moves  She notes good sensation of the left upper extremity and good movement of all digits of the left hand  Hemoglobin did drop to 6 8 today  Internal medicine has already ordered 1 unit of PRBCs  The patient denies any chest pain, or dizziness  She does complain of being fatigued though  No acute overnight events  Vitals: Blood pressure (!) 132/48, pulse 80, temperature 98 1 °F (36 7 °C), temperature source Oral, resp  rate 16, height 5' 2 01" (1 575 m), weight 127 kg (279 lb 15 8 oz), SpO2 95 %, not currently breastfeeding  ,Body mass index is 51 2 kg/m²  Intake/Output Summary (Last 24 hours) at 3/4/2022 0834  Last data filed at 3/4/2022 0300  Gross per 24 hour   Intake --   Output 950 ml   Net -950 ml       Invasive Devices  Report    Peripheral Intravenous Line            Peripheral IV Dorsal (posterior); Right Forearm -- days                Ortho Exam: Alert and oriented X 3, sitting up comfortably in bed eating breakfast  Left shoulder:  Dressing CDI  Swelling shoulder with ecchymosis  Sensation intact axillay, median, ulnar, and radial nerve distributions on operative extremity  Move all  fingers freely  2+radial pulse  Lab, Imaging and other studies: I have personally reviewed pertinent lab results      CBC:   Lab Results   Component Value Date    WBC 10 68 (H) 03/04/2022    HGB 6 8 (LL) 03/04/2022    HCT 23 2 (L) 03/04/2022    MCV 98 03/04/2022     03/04/2022    ADJUSTEDWBC 10 70 06/30/2016    MCH 28 4 03/04/2022    MCHC 28 9 (L) 03/04/2022    RDW 16 5 (H) 03/04/2022    MPV 9 6 03/04/2022    NRBC 0 03/04/2022     CMP:   Lab Results   Component Value Date     03/04/2022    CO2 27 03/04/2022    BUN 47 (H) 03/04/2022    CREATININE 2 30 (H) 03/04/2022    CALCIUM 6 9 (L) 03/04/2022    AST 23 03/01/2022    ALT 23 03/01/2022    ALKPHOS 69 03/01/2022    EGFR 21 03/04/2022     PT/INR:   Lab Results   Component Value Date    INR 1 13 02/16/2022                   Assessment:  Status post reverse total shoulder replacement    Plan:  The patient is doing well from a shoulder perspective  Due to her ongoing medical issues, she likely will not be ready for discharge today  We will continue to monitor her hemoglobin status post transfusion  Her creatinine does continue to be elevated  Potassium levels are improving  Internal medicine is on board for medical management  She will remain in her sling with her dressing in place until her first post operative appointment  The patient's admission was planned and anticipated prior to her surgery due to her multiple medical comorbidities

## 2022-03-05 LAB
ABO GROUP BLD BPU: NORMAL
AMORPH URATE CRY URNS QL MICRO: ABNORMAL /HPF
ANION GAP SERPL CALCULATED.3IONS-SCNC: 8 MMOL/L (ref 4–13)
BACTERIA UR QL AUTO: ABNORMAL /HPF
BASOPHILS # BLD AUTO: 0.05 THOUSANDS/ΜL (ref 0–0.1)
BASOPHILS NFR BLD AUTO: 1 % (ref 0–1)
BILIRUB UR QL STRIP: NEGATIVE
BPU ID: NORMAL
BUN SERPL-MCNC: 47 MG/DL (ref 5–25)
CALCIUM SERPL-MCNC: 7.7 MG/DL (ref 8.3–10.1)
CHLORIDE SERPL-SCNC: 103 MMOL/L (ref 100–108)
CK SERPL-CCNC: 102 U/L (ref 26–192)
CLARITY UR: ABNORMAL
CO2 SERPL-SCNC: 28 MMOL/L (ref 21–32)
COLOR UR: YELLOW
CREAT SERPL-MCNC: 2.02 MG/DL (ref 0.6–1.3)
CROSSMATCH: NORMAL
EOSINOPHIL # BLD AUTO: 0.38 THOUSAND/ΜL (ref 0–0.61)
EOSINOPHIL NFR BLD AUTO: 4 % (ref 0–6)
ERYTHROCYTE [DISTWIDTH] IN BLOOD BY AUTOMATED COUNT: 16.2 % (ref 11.6–15.1)
GFR SERPL CREATININE-BSD FRML MDRD: 24 ML/MIN/1.73SQ M
GLUCOSE SERPL-MCNC: 101 MG/DL (ref 65–140)
GLUCOSE SERPL-MCNC: 119 MG/DL (ref 65–140)
GLUCOSE SERPL-MCNC: 45 MG/DL (ref 65–140)
GLUCOSE SERPL-MCNC: 62 MG/DL (ref 65–140)
GLUCOSE SERPL-MCNC: 69 MG/DL (ref 65–140)
GLUCOSE SERPL-MCNC: 74 MG/DL (ref 65–140)
GLUCOSE SERPL-MCNC: 77 MG/DL (ref 65–140)
GLUCOSE SERPL-MCNC: 78 MG/DL (ref 65–140)
GLUCOSE SERPL-MCNC: 78 MG/DL (ref 65–140)
GLUCOSE SERPL-MCNC: 93 MG/DL (ref 65–140)
GLUCOSE UR STRIP-MCNC: NEGATIVE MG/DL
HCT VFR BLD AUTO: 25.9 % (ref 34.8–46.1)
HGB BLD-MCNC: 7.8 G/DL (ref 11.5–15.4)
HGB UR QL STRIP.AUTO: NEGATIVE
IMM GRANULOCYTES # BLD AUTO: 0.08 THOUSAND/UL (ref 0–0.2)
IMM GRANULOCYTES NFR BLD AUTO: 1 % (ref 0–2)
KETONES UR STRIP-MCNC: NEGATIVE MG/DL
LEUKOCYTE ESTERASE UR QL STRIP: NEGATIVE
LYMPHOCYTES # BLD AUTO: 1.38 THOUSANDS/ΜL (ref 0.6–4.47)
LYMPHOCYTES NFR BLD AUTO: 15 % (ref 14–44)
MCH RBC QN AUTO: 29.7 PG (ref 26.8–34.3)
MCHC RBC AUTO-ENTMCNC: 30.1 G/DL (ref 31.4–37.4)
MCV RBC AUTO: 99 FL (ref 82–98)
MONOCYTES # BLD AUTO: 1.04 THOUSAND/ΜL (ref 0.17–1.22)
MONOCYTES NFR BLD AUTO: 11 % (ref 4–12)
NEUTROPHILS # BLD AUTO: 6.41 THOUSANDS/ΜL (ref 1.85–7.62)
NEUTS SEG NFR BLD AUTO: 68 % (ref 43–75)
NITRITE UR QL STRIP: NEGATIVE
NON-SQ EPI CELLS URNS QL MICRO: ABNORMAL /HPF
NRBC BLD AUTO-RTO: 0 /100 WBCS
PH UR STRIP.AUTO: 5 [PH]
PLATELET # BLD AUTO: 320 THOUSANDS/UL (ref 149–390)
PMV BLD AUTO: 9.9 FL (ref 8.9–12.7)
POTASSIUM SERPL-SCNC: 4.4 MMOL/L (ref 3.5–5.3)
PROT UR STRIP-MCNC: ABNORMAL MG/DL
RBC # BLD AUTO: 2.63 MILLION/UL (ref 3.81–5.12)
RBC #/AREA URNS AUTO: ABNORMAL /HPF
SODIUM SERPL-SCNC: 139 MMOL/L (ref 136–145)
SP GR UR STRIP.AUTO: >=1.03 (ref 1–1.03)
UNIT DISPENSE STATUS: NORMAL
UNIT PRODUCT CODE: NORMAL
UNIT PRODUCT VOLUME: 350 ML
UNIT RH: NORMAL
UROBILINOGEN UR QL STRIP.AUTO: 0.2 E.U./DL
WBC # BLD AUTO: 9.34 THOUSAND/UL (ref 4.31–10.16)
WBC #/AREA URNS AUTO: ABNORMAL /HPF

## 2022-03-05 PROCEDURE — 81001 URINALYSIS AUTO W/SCOPE: CPT | Performed by: INTERNAL MEDICINE

## 2022-03-05 PROCEDURE — 80048 BASIC METABOLIC PNL TOTAL CA: CPT | Performed by: INTERNAL MEDICINE

## 2022-03-05 PROCEDURE — 85025 COMPLETE CBC W/AUTO DIFF WBC: CPT | Performed by: INTERNAL MEDICINE

## 2022-03-05 PROCEDURE — 82948 REAGENT STRIP/BLOOD GLUCOSE: CPT

## 2022-03-05 PROCEDURE — 97535 SELF CARE MNGMENT TRAINING: CPT

## 2022-03-05 PROCEDURE — 97530 THERAPEUTIC ACTIVITIES: CPT

## 2022-03-05 PROCEDURE — 99024 POSTOP FOLLOW-UP VISIT: CPT | Performed by: PHYSICIAN ASSISTANT

## 2022-03-05 PROCEDURE — 82550 ASSAY OF CK (CPK): CPT | Performed by: INTERNAL MEDICINE

## 2022-03-05 PROCEDURE — 99232 SBSQ HOSP IP/OBS MODERATE 35: CPT | Performed by: INTERNAL MEDICINE

## 2022-03-05 RX ORDER — MAGNESIUM CARB/ALUMINUM HYDROX 105-160MG
148 TABLET,CHEWABLE ORAL ONCE
Status: COMPLETED | OUTPATIENT
Start: 2022-03-05 | End: 2022-03-05

## 2022-03-05 RX ORDER — DEXTROSE 10 % IN WATER 10 %
INTRAVENOUS SOLUTION INTRAVENOUS
Status: COMPLETED
Start: 2022-03-05 | End: 2022-03-06

## 2022-03-05 RX ORDER — POLYETHYLENE GLYCOL 3350 17 G/17G
17 POWDER, FOR SOLUTION ORAL DAILY
Status: DISCONTINUED | OUTPATIENT
Start: 2022-03-05 | End: 2022-03-07 | Stop reason: HOSPADM

## 2022-03-05 RX ORDER — INSULIN GLARGINE 100 [IU]/ML
30 INJECTION, SOLUTION SUBCUTANEOUS EVERY 12 HOURS SCHEDULED
Status: DISCONTINUED | OUTPATIENT
Start: 2022-03-05 | End: 2022-03-06

## 2022-03-05 RX ORDER — HEPARIN SODIUM 5000 [USP'U]/ML
5000 INJECTION, SOLUTION INTRAVENOUS; SUBCUTANEOUS EVERY 8 HOURS SCHEDULED
Status: DISCONTINUED | OUTPATIENT
Start: 2022-03-05 | End: 2022-03-07 | Stop reason: HOSPADM

## 2022-03-05 RX ORDER — DEXTROSE MONOHYDRATE 25 G/50ML
50 INJECTION, SOLUTION INTRAVENOUS ONCE
Status: DISCONTINUED | OUTPATIENT
Start: 2022-03-05 | End: 2022-03-07 | Stop reason: HOSPADM

## 2022-03-05 RX ADMIN — METOPROLOL TARTRATE 50 MG: 50 TABLET, FILM COATED ORAL at 21:57

## 2022-03-05 RX ADMIN — PREGABALIN 100 MG: 100 CAPSULE ORAL at 17:15

## 2022-03-05 RX ADMIN — NYSTATIN: 100000 POWDER TOPICAL at 10:48

## 2022-03-05 RX ADMIN — METOPROLOL TARTRATE 50 MG: 50 TABLET, FILM COATED ORAL at 09:09

## 2022-03-05 RX ADMIN — AMLODIPINE BESYLATE 5 MG: 5 TABLET ORAL at 09:08

## 2022-03-05 RX ADMIN — DOCUSATE SODIUM 100 MG: 100 CAPSULE ORAL at 17:15

## 2022-03-05 RX ADMIN — PREGABALIN 100 MG: 100 CAPSULE ORAL at 09:09

## 2022-03-05 RX ADMIN — ACETAMINOPHEN 650 MG: 325 TABLET, FILM COATED ORAL at 09:06

## 2022-03-05 RX ADMIN — HYDROCODONE BITARTRATE AND ACETAMINOPHEN 1 TABLET: 5; 325 TABLET ORAL at 10:46

## 2022-03-05 RX ADMIN — MAGNESIUM CITRATE 148 ML: 1.75 LIQUID ORAL at 15:14

## 2022-03-05 RX ADMIN — INSULIN GLARGINE 30 UNITS: 100 INJECTION, SOLUTION SUBCUTANEOUS at 10:46

## 2022-03-05 RX ADMIN — PRAVASTATIN SODIUM 80 MG: 80 TABLET ORAL at 17:15

## 2022-03-05 RX ADMIN — NYSTATIN: 100000 POWDER TOPICAL at 17:16

## 2022-03-05 RX ADMIN — INSULIN GLARGINE 30 UNITS: 100 INJECTION, SOLUTION SUBCUTANEOUS at 21:57

## 2022-03-05 RX ADMIN — HEPARIN SODIUM 5000 UNITS: 5000 INJECTION INTRAVENOUS; SUBCUTANEOUS at 21:58

## 2022-03-05 RX ADMIN — MONTELUKAST 10 MG: 10 TABLET, FILM COATED ORAL at 21:57

## 2022-03-05 RX ADMIN — BISACODYL 10 MG: 10 SUPPOSITORY RECTAL at 10:46

## 2022-03-05 RX ADMIN — HYDROCODONE BITARTRATE AND ACETAMINOPHEN 1 TABLET: 5; 325 TABLET ORAL at 18:18

## 2022-03-05 RX ADMIN — LORATADINE 10 MG: 10 TABLET ORAL at 09:09

## 2022-03-05 RX ADMIN — HEPARIN SODIUM 5000 UNITS: 5000 INJECTION INTRAVENOUS; SUBCUTANEOUS at 15:19

## 2022-03-05 RX ADMIN — POLYETHYLENE GLYCOL 3350 17 G: 17 POWDER, FOR SOLUTION ORAL at 10:47

## 2022-03-05 RX ADMIN — DOCUSATE SODIUM 100 MG: 100 CAPSULE ORAL at 09:09

## 2022-03-05 RX ADMIN — DEXTROSE MONOHYDRATE 125 ML: 100 INJECTION, SOLUTION INTRAVENOUS at 00:13

## 2022-03-05 RX ADMIN — SENNOSIDES 8.6 MG: 8.6 TABLET, FILM COATED ORAL at 21:57

## 2022-03-05 NOTE — ASSESSMENT & PLAN NOTE
Patient did not have a bowel movement in few days  Patient is on Colace and MiraLax  Added on senna    Patient was given a classic positive and had a very small bowel movement  Ordered for magnesium citrate 146 milliliters today

## 2022-03-05 NOTE — ASSESSMENT & PLAN NOTE
Patient sustained a left proximal humerus fracture after a fall  Patient underwent left shoulder reverse arthroplasty by Orthopedics POD# 4  Further care per orthopedics  Encourage incentive spirometry, pain management  PT/OT  Continue Tylenol for mild pain and Percocet for moderate pain

## 2022-03-05 NOTE — OCCUPATIONAL THERAPY NOTE
Occupational Therapy Treatment Session        03/05/22 1435   OT Last Visit   OT Visit Date 03/05/22   Note Type   Note Type Treatment   Restrictions/Precautions   Weight Bearing Precautions Per Order Yes   LUE Weight Bearing Per Order NWB   Braces or Orthoses   (LUE abduction sling )   Other Precautions Chair Alarm;Pain; Fall Risk;O2;Telemetry;Multiple lines   Pain Assessment   Pain Assessment Tool 0-10   Pain Score 5   Pain Location/Orientation Orientation: Left; Location: Arm   Pain Onset/Description Onset: Ongoing   Effect of Pain on Daily Activities limits activity    Patient's Stated Pain Goal No pain   Hospital Pain Intervention(s) Repositioned; Rest   Multiple Pain Sites No   ADL   Eating Assistance 5  Supervision/Setup   Eating Deficit Setup   Grooming Assistance 4  Minimal Assistance   Grooming Deficit Setup; Increased time to complete;Brushing hair   Grooming Comments assistance needed for thoroughness    UB Dressing Assistance 2  Maximal Assistance   UB Dressing Deficit Increased time to complete;Supervision/safety  (to manage LUE sling and hospital gown )   LB Dressing Assistance 2  Maximal Assistance   LB Dressing Deficit Don/doff R sock; Don/doff L sock; Increased time to complete;Supervision/safety   Toileting Assistance  2  Maximal Assistance   Toileting Deficit Clothing management up;Clothing management down;Perineal hygiene; Bedside commode   Bed Mobility   Supine to Sit 3  Moderate assistance   Additional items Assist x 2; Increased time required;Verbal cues   Transfers   Sit to Stand 3  Moderate assistance   Additional items Assist x 1; Increased time required;Verbal cues   Stand to Sit 3  Moderate assistance   Additional items Assist x 1; Increased time required;Verbal cues   Stand pivot 3  Moderate assistance   Toilet transfer 3  Moderate assistance   Additional items Assist x 1;Trapeze bar;Verbal cues; Commode   Additional items   (+ hemiwalker )   Functional Mobility   Functional Mobility 3  Moderate assistance   Additional Comments few steps to/from commode   Additional items Hemiwalker   Cognition   Overall Cognitive Status WFL   Arousal/Participation Alert   Attention Within functional limits   Orientation Level Oriented X4   Memory Within functional limits   Following Commands Follows all commands and directions without difficulty   Assessment   Assessment Patient participated in OT treatment session this PM focusing on ADLs, functional transfers, and functional mobility  Patient received in bed with + O2 via NC in NAD  Patient agreeable and tolerated session well  Patient left out of bed in the chair with all lines intact, call bell and all needs in reach and chair alarm on  The patient's raw score on the AM-PAC Daily Activity inpatient short form is 12, standardized score is 30 6, less than 39 4  Patients at this level are likely to benefit from DC to post-acute rehabilitation services  Please refer to the recommendation of the Occupational Therapist for safe DC planning  Plan   Treatment Interventions ADL retraining;Functional transfer training; Endurance training;Cognitive reorientation;UE strengthening/ROM; Patient/family training;Equipment evaluation/education; Compensatory technique education;Continued evaluation; Energy conservation; Activityengagement   Goal Expiration Date 03/16/22   OT Frequency 5x/wk   Recommendation   OT Discharge Recommendation Post acute rehabilitation services   [facility]   Lower Body Dressing 1   Bathing 1   Toileting 2   Upper Body Dressing 2   Grooming 3   Eating 3   Daily Activity Raw Score 12   Daily Activity Standardized Score (Calc for Raw Score >=11) 30 6   Barthel Index   Feeding 5   Bathing 0   Grooming Score 0   Dressing Score 5   Bladder Score 10   Bowels Score 10   Toilet Use Score 5   Transfers (Bed/Chair) Score 5   Mobility (Level Surface) Score 0   Stairs Score 0   Barthel Index Score 40       MS Shashi, OTR/L [id number]

## 2022-03-05 NOTE — PROGRESS NOTES
Progress Note - Orthopedics   Stacy Salvador 73 y o  female MRN: [id number]  Unit/Bed#: [address] [place] [address]      Subjective:    73 y  o female s/p Left reverse total shoulder for fracture on 3/1/22 with Dr Michel Angelo No acute events, no complaints  Pt doing well  Pain controlled   Denies fevers chills, CP, SOB    Labs:  0   Lab Value Date/Time    HCT 25 9 (L) 03/05/2022 0558    HCT 23 2 (L) 03/04/2022 0515    HCT 27 7 (L) 03/02/2022 0551    HGB 7 8 (L) 03/05/2022 0558    HGB 6 8 (LL) 03/04/2022 0515    HGB 8 1 (L) 03/02/2022 0551    INR 1 13 02/16/2022 2335    WBC 9 34 03/05/2022 0558    WBC 10 68 (H) 03/04/2022 0515    WBC 11 72 (H) 03/02/2022 0551       Meds:    Current Facility-Administered Medications:     acetaminophen (TYLENOL) tablet 650 mg, 650 mg, Oral, Q6H PRN, Bren Browne PA-C, 650 mg at 03/05/22 0906    albuterol (PROVENTIL HFA,VENTOLIN HFA) inhaler 2 puff, 2 puff, Inhalation, Q6H PRN, Bren Browne PA-C, 2 puff at 03/04/22 1843    amLODIPine (NORVASC) tablet 5 mg, 5 mg, Oral, Daily, Isaías Up MD, 5 mg at 03/05/22 0908    bisacodyl (DULCOLAX) rectal suppository 10 mg, 10 mg, Rectal, Daily PRN, Jerod Garcia MD    dextrose 50 % IV solution 50 mL, 50 mL, Intravenous, Once, Fernanda MICHELLE Rojas    docusate sodium (COLACE) capsule 100 mg, 100 mg, Oral, BID, Bren Browne PA-C, 100 mg at 03/05/22 0909    HYDROcodone-acetaminophen (NORCO) 5-325 mg per tablet 1 tablet, 1 tablet, Oral, Q4H PRN, Benn Landau, MD, 1 tablet at 03/04/22 1842    insulin glargine (LANTUS) subcutaneous injection 30 Units 0 3 mL, 30 Units, Subcutaneous, Q12H [facility], RK Horner    insulin lispro (HumaLOG) 100 units/mL subcutaneous injection 2-12 Units, 2-12 Units, Subcutaneous, TID AC, 2 Units at 03/04/22 1250 **AND** Fingerstick Glucose (POCT), , , TID AC, Shantel Agrawal PA-C    insulin lispro (HumaLOG) 100 units/mL subcutaneous injection 5 Units, 5 Units, Subcutaneous, TID With Meals, Ben Fernandez MD Hardik, 5 Units at 03/04/22 1250    loratadine (CLARITIN) tablet 10 mg, 10 mg, Oral, Daily, GEE Atkinson-NAKITA, 10 mg at 03/05/22 0909    metoprolol tartrate (LOPRESSOR) tablet 50 mg, 50 mg, Oral, Q12H [facility], GEE Atkinson-C, 50 mg at 03/05/22 0909    montelukast (SINGULAIR) tablet 10 mg, 10 mg, Oral, HS, GEE Atkinson-C, 10 mg at 03/04/22 2230    nystatin (MYCOSTATIN) powder, , Topical, BID, Derian Valladares PA-C, Given at 03/04/22 1757    [facility]ron [facility] injection 4 mg, 4 mg, Intravenous, Q6H PRN, GEE Atkinson-NAKITA    polyethylene glycol (MIRALAX) packet 17 g, 17 g, Oral, Daily PRN, Kamala Powers MD, 17 g at 03/03/22 1624    pravastatin (PRAVACHOL) tablet 80 mg, 80 mg, Oral, Daily With Dinner, Derian Valladares PA-C, 80 mg at 03/04/22 1756    pregabalin (LYRICA) capsule 100 mg, 100 mg, Oral, BID, GEE Atkinson-C, 100 mg at 03/05/22 0909    senna (SENOKOT) tablet 8 6 mg, 1 tablet, Oral, HS, Kamala Powers MD, 8 6 mg at 03/04/22 2230    Blood Culture:   Lab Results   Component Value Date    BLOODCX No Growth After 5 Days  05/25/2019       Wound Culture:   No results found for: WOUNDCULT    Ins and Outs:  I/O last 24 hours: In: 350 [Blood:350]  Out: -           Physical:  Vitals:    03/05/22 0808   BP: 146/67   Pulse: 75   Resp: 20   Temp: 98 °F (36 7 °C)   SpO2: 95%     Musculoskeletal: left Upper Extremity  · Skin mild ecchymosis to upper arm, no erythema  · Dressing c/d/i  · TTP about the shoulder  · SILT m/r/u  Motor intact ain/pin/m/r/u, 2+ radial pulse        Assessment:    73 y  o female POD 4 s/p L reverse total shoulder for fracture     Plan:  · NWB LUE  · Greater than 2 gram drop which qualifies for diagnosis of acute blood loss anemia   Received 1 unit PRBC yesterday with Hg 7 8, baseline is between 8 and 9  · Potassium inproved after Kayexelate to 4 4  · PT/OT  · Pain control  · DVT ppx  · Dispo: Discussed with medicine, her Cr is elevate and due to cardiac history may received another unit of PRBC today  Likely discharge tomorrow       Maryetta Apley, PA-C

## 2022-03-05 NOTE — PROGRESS NOTES
NEPHROLOGY PROGRESS NOTE   Niko Valentine 73 y o  female MRN: [id number]  Unit/Bed#: [address] [address] Encounter: [id number]    ASSESSMENT & PLAN:  73 y o  female with history of chronic kidney disease stage 3, diabetes mellitus type 2, CKD follows with Giacomo Riley but was last seen in office in 2019 by advanced practitioner, hypertension, chronic diastolic CHF, paroxysmal atrial fibrillation who was admitted to [facility] for  left humerus fracture and underwent left shoulder reverse arthroplasty on 03/01/2022  Nephrology consulted for acute kidney injury    Acute kidney injury  -Baseline creatinine:  1 1-1 5 mg/dl  -Admission creatinine:  1 03 mg/dl  - Work up:   · UA with microscopy:  Ordered  Previous UA with 1+ protein and 0-1 RBCs  -Etiology:  Acute kidney injury likely due to postop ATN hemodynamic changes as well as drop in hemoglobin requiring PRBC on 03/04   [facility] Course:  renal function improving from peak creatinine 2 3 to creatinine 2 0 today  Bladder scan 282 mL on 3/1    -Plan:   · Renal function improving to creatinine 2 0, stable electrolytes  Hemoglobin improving, continue to monitor  Need for IV fluids  · No indication for renal replacement therapy  · Avoid nephrotoxins and dose all medications per EGFR  · Avoid hypotension                                              Chronic Kidney Disease Stage 3  -Outpatient Nephrologist: Dr Shawna Garcia  -Baseline Creatinine: 1 1-1 5    Recent acute kidney injury in February 2022, creatinine improved from peak 3 3 to creatinine 1 6 on 02/20   -Etiology:  Chronic kidney disease likely due to diabetic nephropathy, hypertensive nephrosclerosis, sequelae of SEBASTIAN  -Avoid Nephrotoxins and Dose all medications per eGFR  -Will need outpatient follow up after discharge      BP/hypertension  -blood pressure was low and so dose of amlodipine was decreased to 5 mg daily with hold parameters on 03/04, blood pressure now trending up, continue current dose of amlodipine but may need to increase the dose if blood pressure remains elevated  Continue metoprolol     Hyperkalemia:  Potassium was 5 7 on admission  -peak potassium was 6 6, received medical treatment and last potassium was 4 4, continue to monitor  Recommend low-potassium diet  CK level was 102 on 03/05     Anemia unspecified  -status post PRBC on 03/04 for hemoglobin 6 8 and hemoglobin since then has improved to 7 8 mg/dL on 03/05, continue to monitor primary team     Left humerus fracture, status post left shoulder reverse arthroplasty on 03/01, continue postop care per Orthopedics     Others:  Paroxysmal atrial fibrillation on metoprolol and Eliquis/diabetes mellitus type 2 on insulin management per primary team/obstructive sleep apnea     Discussed with SLIM  SUBJECTIVE:  No new complaints, no chest pain or shortness of breath    OBJECTIVE:  Current Weight: Weight - Scale: 127 kg (279 lb 15 8 oz)  Vitals:    03/05/22 0808   BP: 146/67   Pulse: 75   Resp: 20   Temp: 98 °F (36 7 °C)   SpO2: 95%     No intake or output data in the 24 hours ending 03/05/22 1526    Physical Exam  General:  Ill looking, awake  Obese appearing   Eyes: Conjunctivae pink,  Sclera anicteric  ENT: lips and mucous membranes moist  Neck: supple   Chest: Clear to Auscultation both lungs,  no crackles, ronchus or wheezing  CVS: S1 & S2 present, normal rate, regular rhythm, no murmur    Abdomen: soft, non-tender, non-distended, Bowel sounds normoactive  Extremities: no edema of  legs  Skin: no rash  Neuro: awake, alert, oriented x 3   Psych: Mood and affect appropriate       Medications:    Current Facility-Administered Medications:     acetaminophen (TYLENOL) tablet 650 mg, 650 mg, Oral, Q6H PRN, Madhav Hsu PA-C, 650 mg at 03/05/22 0906    albuterol (PROVENTIL HFA,VENTOLIN HFA) inhaler 2 puff, 2 puff, Inhalation, Q6H PRN, Madhav Hsu PA-C, 2 puff at 03/04/22 1843    amLODIPine (NORVASC) tablet 5 mg, 5 mg, Oral, Daily, Enid Marx MD, 5 mg at 03/05/22 0908    bisacodyl (DULCOLAX) rectal suppository 10 mg, 10 mg, Rectal, Daily PRN, Harsh Dye MD, 10 mg at 03/05/22 1046    dextrose 50 % IV solution 50 mL, 50 mL, Intravenous, Once, Fernanda Rojas PA-C    docusate sodium (COLACE) capsule 100 mg, 100 mg, Oral, BID, Belia Conrad PA-C, 100 mg at 03/05/22 0909    heparin (porcine) subcutaneous injection 5,000 Units, 5,000 Units, Subcutaneous, Q8H [address], Veronica Escamilla MD, 5,000 Units at 03/05/22 1519    HYDROcodone-acetaminophen (NORCO) 5-325 mg per tablet 1 tablet, 1 tablet, Oral, Q4H PRN, Tiffany Feliciano MD, 1 tablet at 03/05/22 1046    insulin glargine (LANTUS) subcutaneous injection 30 Units 0 3 mL, 30 Units, Subcutaneous, Q12H [address], RK Robles, 30 Units at 03/05/22 1046    insulin lispro (HumaLOG) 100 units/mL subcutaneous injection 2-12 Units, 2-12 Units, Subcutaneous, TID AC, 2 Units at 03/04/22 1250 **AND** Fingerstick Glucose (POCT), , , TID AC, Rachelle Lam PA-C    loratadine (CLARITIN) tablet 10 mg, 10 mg, Oral, Daily, Belia Conrad PA-C, 10 mg at 03/05/22 0909    metoprolol tartrate (LOPRESSOR) tablet 50 mg, 50 mg, Oral, Q12H [address], Belia Conrad PA-C, 50 mg at 03/05/22 7306    montelukast (SINGULAIR) tablet 10 mg, 10 mg, Oral, HS, Belia Conrad PA-C, 10 mg at 03/04/22 2230    nystatin (MYCOSTATIN) powder, , Topical, BID, Belia Conrad PA-C, Given at 03/05/22 1048    ondansetron (ZOFRAN) injection 4 mg, 4 mg, Intravenous, Q6H PRN, Belia Conrad PA-C    polyethylene glycol (MIRALAX) packet 17 g, 17 g, Oral, Daily, Harsh Dye MD, 17 g at 03/05/22 1047    pravastatin (PRAVACHOL) tablet 80 mg, 80 mg, Oral, Daily With Dinner, Belia Conrad PA-C, 80 mg at 03/04/22 1756    pregabalin (LYRICA) capsule 100 mg, 100 mg, Oral, BID, Belia Conrad PA-C, 100 mg at 03/05/22 0909    senna (SENOKOT) tablet 8 6 mg, 1 tablet, Oral, HS, Veronica Escamilla MD, 8 6 mg at 03/04/22 2230    Invasive Devices:        Lab Results:   Results from last 7 days   Lab Units 03/05/22  0558 03/04/22  0515 03/03/22  0922 03/02/22  1439 03/02/22  0551 03/01/22  1246 03/01/22  1246   WBC Thousand/uL 9 34 10 68*  --   --  11 72*  --   --    HEMOGLOBIN g/dL 7 8* 6 8*  --   --  8 1*  --   --    HEMATOCRIT % 25 9* 23 2*  --   --  27 7*  --   --    PLATELETS Thousands/uL 320 263  --   --  271  --   --    POTASSIUM mmol/L 4 4 4 1 5 6*   < > 6 6*   < > 5 7*   CHLORIDE mmol/L 103 104 104   < > 106   < > 107   CO2 mmol/L 28 27 24   < > 27   < > 28   BUN mg/dL 47* 47* 39*   < > 26*   < > 24   CREATININE mg/dL 2 02* 2 30* 2 16*   < > 1 31*   < > 1 03   CALCIUM mg/dL 7 7* 6 9* 7 1*   < > 7 6*   < > 8 4   ALK PHOS U/L  --   --   --   --   --   --  69   ALT U/L  --   --   --   --   --   --  23   AST U/L  --   --   --   --   --   --  23    < > = values in this interval not displayed  Previous work up:         Portions of the record may have been created with voice recognition software  Occasional wrong word or "sound a like" substitutions may have occurred due to the inherent limitations of voice recognition software  Read the chart carefully and recognize, using context, where substitutions have occurred  If you have any questions, please contact the dictating provider

## 2022-03-05 NOTE — PLAN OF CARE
Problem: MOBILITY - ADULT  Goal: Maintain or return to baseline ADL function  Description: INTERVENTIONS:  -  Assess patient's ability to carry out ADLs; assess patient's baseline for ADL function and identify physical deficits which impact ability to perform ADLs (bathing, care of mouth/teeth, toileting, grooming, dressing, etc )  - Assess/evaluate cause of self-care deficits   - Assess range of motion  - Assess patient's mobility; develop plan if impaired  - Assess patient's need for assistive devices and provide as appropriate  - Encourage maximum independence but intervene and supervise when necessary  - Involve family in performance of ADLs  - Assess for home care needs following discharge   - Consider OT consult to assist with ADL evaluation and planning for discharge  - Provide patient education as appropriate  Outcome: Progressing  Goal: Maintains/Returns to pre admission functional level  Description: INTERVENTIONS:  - Perform BMAT or MOVE assessment daily    - Set and communicate daily mobility goal to care team and patient/family/caregiver  - Collaborate with rehabilitation services on mobility goals if consulted  - Perform Range of Motion 3 times a day  - Reposition patient every 2 hours    - Dangle patient 3 times a day  - Stand patient 3 times a day  - Ambulate patient 3 times a day  - Out of bed to chair 3 times a day   - Out of bed for meals 3 times a day  - Out of bed for toileting  - Record patient progress and toleration of activity level   Outcome: Progressing     Problem: Prexisting or High Potential for Compromised Skin Integrity  Goal: Skin integrity is maintained or improved  Description: INTERVENTIONS:  - Identify patients at risk for skin breakdown  - Assess and monitor skin integrity  - Assess and monitor nutrition and hydration status  - Monitor labs   - Assess for incontinence   - Turn and reposition patient  - Assist with mobility/ambulation  - Relieve pressure over bony prominences  - Avoid friction and shearing  - Provide appropriate hygiene as needed including keeping skin clean and dry  - Evaluate need for skin moisturizer/barrier cream  - Collaborate with interdisciplinary team   - Patient/family teaching  - Consider wound care consult   Outcome: Progressing     Problem: Potential for Falls  Goal: Patient will remain free of falls  Description: INTERVENTIONS:  - Educate patient/family on patient safety including physical limitations  - Instruct patient to call for assistance with activity   - Consult OT/PT to assist with strengthening/mobility   - Keep Call bell within reach  - Keep bed low and locked with side rails adjusted as appropriate  - Keep care items and personal belongings within reach  - Initiate and maintain comfort rounds  - Make Fall Risk Sign visible to staff  - Offer Toileting every 2 Hours, in advance of need  - Initiate/Maintain bed alarm  - Obtain necessary fall risk management equipment  - Apply yellow socks and bracelet for high fall risk patients  - Consider moving patient to room near nurses station  Outcome: Progressing     Problem: Nutrition/Hydration-ADULT  Goal: Nutrient/Hydration intake appropriate for improving, restoring or maintaining nutritional needs  Description: Monitor and assess patient's nutrition/hydration status for malnutrition  Collaborate with interdisciplinary team and initiate plan and interventions as ordered  Monitor patient's weight and dietary intake as ordered or per policy  Utilize nutrition screening tool and intervene as necessary  Determine patient's food preferences and provide high-protein, high-caloric foods as appropriate       INTERVENTIONS:  - Monitor oral intake, urinary output, labs, and treatment plans  - Assess nutrition and hydration status and recommend course of action  - Evaluate amount of meals eaten  - Assist patient with eating if necessary   - Allow adequate time for meals  - Recommend/ encourage appropriate diets, oral nutritional supplements, and vitamin/mineral supplements  - Order, calculate, and assess calorie counts as needed  - Recommend, monitor, and adjust tube feedings and TPN/PPN based on assessed needs  - Assess need for intravenous fluids  - Provide specific nutrition/hydration education as appropriate  - Include patient/family/caregiver in decisions related to nutrition  Outcome: Progressing

## 2022-03-05 NOTE — PROGRESS NOTES
Progress Note - Cardiology   [facility]     Margarita Fernandez 73 y o  female MRN: [id number]  : 1950  Unit/Bed#: 2 [place] [address] Encounter: [id number]    Assessment and Plan:   1  Paroxysmal atrial fibrillation:  Patient maintaining sinus rhythm    -  continue Lopressor 50 mg q 12 hours    -  Eliquis currently on hold since 2022 due to drop in hemoglobin    2  Fall status post fracture of left humeral head:  Postop day 4  Left reverse total shoulder replacement    -  care per Orthopedics and PT/OT    3  Acute kidney injury:  Patient's baseline creatinine appears to be around 1 5     -  was 3 32 on admission and is slowly improving    -  continue monitor    -  avoid ACE/ARB/NSAIDs    4  Hypertension:  Currently stable  Continue monitor    5  Postop anemia:  Appears baseline hemoglobin is 11 7       -  She has been anemic since admission and received 1 unit packed red blood cells on 2022 for hemoglobin of 6 8    -  Eliquis currently on hold    -  would recommend outpatient GI workup once stable from her surgery  6  Diabetes:  Hemoglobin A1c 8 2  Would recommend to be less than 7  Followed by the primary team    7  Dyslipidemia:  Continue statin therapy    8  Obesity with obstructive sleep apnea    Subjective / Objective:   Patient seen and examined  She is maintaining sinus rhythm  She is 4 days post left reverse total shoulder replacement secondary to fracture  No cardiac complaints offered  Her only complaint is of operative discomfort  Vitals: Blood pressure 146/67, pulse 75, temperature 98 °F (36 7 °C), temperature source Oral, resp  rate 20, height 5' 2 01" (1 575 m), weight 127 kg (279 lb 15 8 oz), SpO2 95 %, not currently breastfeeding  Vitals:    22 1940 22 0758   Weight: 127 kg (279 lb 15 8 oz) 127 kg (279 lb 15 8 oz)     Body mass index is 51 2 kg/m²    BP Readings from Last 3 Encounters:   22 146/67   22 122/74   22 121/69 TEST:  Treadmill Stress Test  DATE OF TEST:  01/19/2019  DATE OF BIRTH:    INDICATION:    ORDERING PHYSICIAN:  Sha Granado MD  LOCATION:  [facility] Lab  MEDICATIONS:                             DO NOT SIGN UNTIL TABLE DATA HAS BEEN ENTERED    REPORT TITLE:  Exercise electrocardiogram.       DICTATION ENDED AT THIS POINT.            Sha Granado MD    AP/MedQ     DD:  01/19/2019 14:55:22 / DT:  01/19/2019 15:26:46     Job #:  [id number]      Orthostatic Blood Pressures      Most Recent Value   Blood Pressure 146/67 filed at 03/05/2022 6041   Patient Position - Orthostatic VS Lying filed at 03/05/2022 2309        I/O       03/03 0701  03/04 0700 03/04 0701  03/05 0700 03/05 0701  03/06 0700    Blood  350     Total Intake(mL/kg)  350 (2 8)     Urine (mL/kg/hr) 950 (0 3)      Total Output 950      Net -950 +350            Unmeasured Urine Occurrence 1 x 2 x     Unmeasured Stool Occurrence   1 x        Invasive Devices  Report    Peripheral Intravenous Line            Peripheral IV 03/04/22 Distal;Right;Upper;Ventral (anterior) Arm 1 day                  Intake/Output Summary (Last 24 hours) at 3/5/2022 1343  Last data filed at 3/4/2022 1514  Gross per 24 hour   Intake 350 ml   Output --   Net 350 ml         Physical Exam:   Physical Exam  Vitals and nursing note reviewed  Constitutional:       Appearance: Normal appearance  She is well-developed  She is morbidly obese  HENT:      Right Ear: External ear normal       Left Ear: External ear normal    Eyes:      General: No scleral icterus  Right eye: No discharge  Left eye: No discharge  Neck:      Thyroid: No thyromegaly  Cardiovascular:      Rate and Rhythm: Normal rate and regular rhythm  Pulses: Normal pulses  Heart sounds: Normal heart sounds  Pulmonary:      Effort: Pulmonary effort is normal  No accessory muscle usage or respiratory distress  Breath sounds: Examination of the right-middle field reveals decreased breath sounds  Examination of the right-lower field reveals decreased breath sounds  Examination of the left-lower field reveals decreased breath sounds  Decreased breath sounds present  Abdominal:      General: Bowel sounds are normal  There is no distension  Palpations: Abdomen is soft  Musculoskeletal:      Cervical back: Normal range of motion and neck supple  Right lower leg: Edema present  Left lower leg: Edema present  Comments: Component chronic lymphedema   Skin:     General: Skin is warm and dry  Capillary Refill: Capillary refill takes less than 2 seconds  Neurological:      General: No focal deficit present  Mental Status: She is alert and oriented to person, place, and time  Mental status is at baseline                   Medications/ Allergies:     Current Facility-Administered Medications   Medication Dose Route Frequency Provider Last Rate    acetaminophen  650 mg Oral Q6H PRN Katjackleen Hives, MICHELLE      albuterol  2 puff Inhalation Q6H PRN Katdenis HivesMICHELLE      amLODIPine  5 mg Oral Daily Belinda Hodges MD      bisacodyl  10 mg Rectal Daily PRN Ariel Auguste MD      dextrose  50 mL Intravenous Once Geni Peterson PA-C      docusate sodium  100 mg Oral BID Kathyleen Hives, MICHELLE      HYDROcodone-acetaminophen  1 tablet Oral Q4H PRN Ally Malloy MD      insulin glargine  30 Units Subcutaneous Q12H [address] RK Edwards      insulin lispro  2-12 Units Subcutaneous TID AC Zaira Lou PA-C      insulin lispro  5 Units Subcutaneous TID With Meals Ariel Auguste MD      loratadine  10 mg Oral Daily Kathyleen Hives, MICHELLE      metoprolol tartrate  50 mg Oral Q12H [address] Kathyleen HivesMICHELLE      montelukast  10 mg Oral HS Kathyleen Hives, MICHELLE      nystatin   Topical BID Kathyleen Hives, MICHELLE      ondansetron  4 mg Intravenous Q6H PRN Katjacklemoon HivesMICHELLE      polyethylene glycol  17 g Oral Daily Ariel Auguste MD      pravastatin  80 mg Oral Daily With Bubba Barrett PA-C      pregabalin  100 mg Oral BID Kathyleen Hives, MICHELLE      senna  1 tablet Oral HS Georgi Dye MD       acetaminophen, 650 mg, Q6H PRN  albuterol, 2 puff, Q6H PRN  bisacodyl, 10 mg, Daily PRN  HYDROcodone-acetaminophen, 1 tablet, Q4H PRN  ondansetron, 4 mg, Q6H PRN      Allergies   Allergen Reactions    Penicillins Hives    Moxifloxacin Other (See Comments)     unknown    Percocet [Oxycodone-Acetaminophen] Other (See Comments)     unknown    Zinc Acetate Other (See Comments)     unknown    Nuts - Food Allergy Other (See Comments)    Asa [Aspirin] GI Intolerance    Indocin [Indomethacin] Other (See Comments)     Made patient "loopy"    Other Other (See Comments)     unknown       VTE Pharmacologic Prophylaxis:   Sequential compression device (Venodyne)     Labs:   Troponins:  Results from last 7 days   Lab Units 03/05/22  0558   CK TOTAL U/L 102     CBC with diff:  Results from last 7 days   Lab Units 03/05/22  0558 03/04/22  0515 03/02/22  0551   WBC Thousand/uL 9 34 10 68* 11 72*   HEMOGLOBIN g/dL 7 8* 6 8* 8 1*   HEMATOCRIT % 25 9* 23 2* 27 7*   MCV fL 99* 98 99*   PLATELETS Thousands/uL 320 263 271   MCH pg 29 7 28 4 28 8   MCHC g/dL 30 1* 28 9* 29 2*   RDW % 16 2* 16 5* 16 8*   MPV fL 9 9 9 6 10 1   NRBC AUTO /100 WBCs 0 0  --      CMP:  Results from last 7 days   Lab Units 03/05/22  0558 03/04/22  0515 03/03/22  0922 03/02/22  1439 03/02/22  0551 03/01/22  1246   SODIUM mmol/L 139 139 136 137 138 139   POTASSIUM mmol/L 4 4 4 1 5 6* 5 8* 6 6* 5 7*   CHLORIDE mmol/L 103 104 104 104 106 107   CO2 mmol/L 28 27 24 26 27 28   ANION GAP mmol/L 8 8 8 7 5 4   BUN mg/dL 47* 47* 39* 29* 26* 24   CREATININE mg/dL 2 02* 2 30* 2 16* 1 80* 1 31* 1 03   GLUCOSE FASTING mg/dL  --   --   --   --  128* 74   CALCIUM mg/dL 7 7* 6 9* 7 1* 7 5* 7 6* 8 4   AST U/L  --   --   --   --   --  23   ALT U/L  --   --   --   --   --  23   ALK PHOS U/L  --   --   --   --   --  69   TOTAL PROTEIN g/dL  --   --   --   --   --  6 4   ALBUMIN g/dL  --   --   --   --   --  2 8*   TOTAL BILIRUBIN mg/dL  --   --   --   --   --  0 77   EGFR ml/min/1 73sq m 24 21 22 28 41 55     Coags:  Results from last 7 days   Lab Units 03/02/22 0009   PTT seconds 31     Hgb A1c:  Results from last 7 days   Lab Units 03/02/22 0009   HEMOGLOBIN A1C % 8 2*       Imaging & Testing   I have personally reviewed pertinent reports      XR scapula LEFT    Result Date: 2/17/2022  Narrative: LEFT SCAPULA INDICATION:   fall, left shoulder pain  COMPARISON:  Left shoulder x-rays same date VIEWS:  XR SCAPULA LEFT FINDINGS: Comminuted fracture of the left proximal humerus including the neck and head noted with head remaining articulated with the scapula, neck displaced from the head  No lytic or blastic osseous lesion  Visualized left hemithorax appears intact  Impression: Displaced comminuted left proximal humeral fracture  Findings concur with the referring clinician's preliminary interpretation already in the patient's electronic health record  Workstation performed: [id number]     XR shoulder left 1 view    Result Date: 3/3/2022  Narrative: LEFT SHOULDER INDICATION:   post op  COMPARISON:  2/23/2022 VIEWS:  XR SHOULDER 1 VW LEFT FINDINGS: There is a reverse left shoulder arthroplasty  Components are in anatomic alignment  There are expected postoperative changes  Impression: Anatomic alignment of left shoulder reverse arthroplasty  Workstation performed: [id number]     XR shoulder 2+ vw left    Result Date: 2/25/2022  Narrative: LEFT SHOULDER INDICATION:   S42 292A: Other displaced fracture of upper end of left humerus, initial encounter for closed fracture  COMPARISON:  02/16/2022 VIEWS:  XR SHOULDER 2+ VW LEFT Images: 3 FINDINGS: Comminuted fracture of the proximal humerus  Humeral shaft is displaced from the humeral head which is located within the glenoid  Mild widening at the glenohumeral joint likely represents pseudosubluxation  No significant degenerative changes  No lytic or blastic osseous lesion  Soft tissues are unremarkable  Impression: Comminuted displaced fracture of the proximal humerus  Workstation performed: [id number]     XR shoulder 2+ views LEFT    Result Date: 2/17/2022  Narrative: LEFT SHOULDER INDICATION:   fall, left shoulder pain   COMPARISON:  Earlier study same day VIEWS:  XR SHOULDER 2+ VW LEFT FINDINGS: Displaced and comminuted fracture of the left proximal humerus is identified similar to the earlier examination  It would appear that the humeral head remains articulated with the scapula wall the humeral neck is displaced from the head  No significant degenerative changes  No lytic or blastic osseous lesion  Soft tissues are unremarkable  Impression: Displaced comminuted left proximal humeral fracture Workstation performed: [id number]     XR elbow 2 vw left    Result Date: 2/18/2022  Narrative: LEFT ELBOW INDICATION:   elbow pain/ecchymosis s/p fall  COMPARISON:  None VIEWS:  A single image is submitted  1 FINDINGS: Limited single image study  There is no acute fracture or dislocation  There is no joint effusion  No significant degenerative changes  No lytic or blastic osseous lesion  Soft tissues are unremarkable  Impression: Limited single image study shows no acute osseous deformity  Workstation performed: [id number]     CT head wo contrast    Result Date: 2/17/2022  Narrative: CT BRAIN - WITHOUT CONTRAST INDICATION:   Head trauma, minor (Age >= 65y) fall  COMPARISON:  CT brain dated July 29, 2019  TECHNIQUE:  CT examination of the brain was performed  In addition to axial images, sagittal and coronal 2D reformatted images were created and submitted for interpretation  Radiation dose length product (DLP) for this visit:  948 mGy-cm   This examination, like all CT scans performed in the [facility], was performed utilizing techniques to minimize radiation dose exposure, including the use of iterative reconstruction and automated exposure control  IMAGE QUALITY:  Diagnostic  FINDINGS: PARENCHYMA:  No intracranial mass, mass effect or midline shift  No CT signs of acute infarction  No acute parenchymal hemorrhage  There is mild periventricular white matter low attenuation which is nonspecific and most likely related to chronic small vessel ischemic changes   VENTRICLES AND EXTRA-AXIAL SPACES:  Normal for the patient's age  VISUALIZED ORBITS AND PARANASAL SINUSES:  Unremarkable  CALVARIUM AND EXTRACRANIAL SOFT TISSUES:  Normal      Impression: No acute intracranial abnormality  Mild chronic small vessel ischemic changes  Workstation performed: [id number]      bedside procedure    Result Date: 3/1/2022  Narrative: 1 2 [id number] [id number]  1 [id number]  9 [id number]    CT upper extremity wo contrast left    Result Date: 2/17/2022  Narrative: CT left shoulder without IV contrast INDICATION: Fracture, humerus Fracture, shoulder displaced fracture  COMPARISON: None  TECHNIQUE: CT examination of the above was performed  This examination, like all CT scans performed in the [facility], was performed utilizing techniques to minimize radiation dose exposure, including the use of iterative reconstruction  and automated exposure control software  Sagittal and coronal two dimensional reconstructed images were also submitted for interpretation  Rad dose  1299 mGy-cm FINDINGS: OSSEOUS STRUCTURES:  Extensively comminuted fracture at the surgical neck of the left proximal humerus with angulation and overriding of the fracture fragments with distal shaft displaced anterior to the humeral head  Glenohumeral joint appears intact  VISUALIZED MUSCULATURE:  Unremarkable  SOFT TISSUES:  Unremarkable  OTHER PERTINENT FINDINGS:  None  Impression: Extensively comminuted fracture at the surgical neck of left proximal humerus as detailed above  Glenohumeral joint appears intact  Workstation performed: [id number]        Suman Frances  Cardiology      "This note has been constructed using a voice recognition system  Therefore there may be syntax, spelling, and/or grammatical errors   Please call if you have any questions  "

## 2022-03-05 NOTE — ASSESSMENT & PLAN NOTE
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

## 2022-03-05 NOTE — ASSESSMENT & PLAN NOTE
Patient has known history of paroxysmal l atrial fibrillation patient currently in sinus rhythm  Continue metoprolol 50 milligram p o  B i d  And anticoagulation with Eliquis  Patient follows up with Dr Chioma Steinberg  Patient continues to remain in sinus rhythm  Continue to hold Eliquis    Will restart Eliquis if hemoglobin continues to remain stable

## 2022-03-05 NOTE — PHYSICAL THERAPY NOTE
PT TREATMENT     03/05/22 1458   PT Last Visit   PT Visit Date 03/05/22   Note Type   Note Type Treatment   Pain Assessment   Pain Assessment Tool 0-10   Pain Score 5   Pain Location/Orientation Orientation: Left; Location: Shoulder   Restrictions/Precautions   Weight Bearing Precautions Per Order Yes   LUE Weight Bearing Per Order NWB   Braces or Orthoses Other (Comment)  (abduction brace/sling)   Other Precautions Chair Alarm; Bed Alarm;O2;Fall Risk;Pain   General   Chart Reviewed Yes   Family/Caregiver Present No   Cognition   Overall Cognitive Status WFL   Arousal/Participation Cooperative   Attention Within functional limits   Orientation Level Oriented X4   Following Commands Follows all commands and directions without difficulty   Subjective   Subjective "I need to use the commode"   Bed Mobility   Supine to Sit 3  Moderate assistance   Additional items Assist x 2;Verbal cues; Increased time required   Additional Comments to chair after using commode   Transfers   Sit to Stand 3  Moderate assistance   Additional items Assist x 1;Verbal cues   Stand to Sit 3  Moderate assistance   Additional items Assist x 1;Verbal cues   Stand pivot 3  Moderate assistance   Additional items Assist x 1;Verbal cues   Additional Comments all above transfers with stand by of 1 due to multiple lines and pt's limited use of LUE, for safety   Ambulation/Elevation   Gait pattern Short stride; Wide NOUR   Gait Assistance 4  Minimal assist   Additional items Assist x 1;Verbal cues; Tactile cues   Assistive Device Adarsh-walker   Distance 3 feet x 2 with changes in direction   Activity Tolerance   Activity Tolerance Patient limited by pain; Patient limited by fatigue   Nurse Made Aware yes: [facility] : how to assist pt to commode with Adarsh walker   Exercises   Ankle Pumps Sitting;20 reps;Bilateral   Balance training  sitting static without support to adjust left abduction brace   Abduction pillow not on correctly   Assessment   Prognosis Good Problem List Decreased strength;Decreased endurance; Impaired balance;Decreased mobility;Obesity; Decreased skin integrity;Pain;Orthopedic restrictions   Assessment Pt presents low in bed in supine, pt's neck was at the part of bed where her low back should be  Pt could not sit herself up in the bed  Pt was assisted to short sit and readjusted brace correctly  Pt has a hard time with bedmobility, however once on her feet, does fairly well with walking a few steps with vin walker  Pt assisted with some personal hygiene items once positioned in wheelchair  Pt reports that she would like to brush her teeth  Pt set up and pt was able to brush her teeth with assist to apply toothpaste  Will continue to progress ambulation with future sessions as tolerated  The patient's AM-PAC Basic Mobility Inpatient Short Form Raw Score is 12  A Raw score of less than or equal to 16 suggests the patient may benefit from discharge to post-acute rehabilitation services  Please also refer to the recommendation of the Physical Therapist for safe discharge planning  Goals   Patient Goals to bruch her teeth, and get to the commode   Plan   Treatment/Interventions ADL retraining;Functional transfer training;LE strengthening/ROM; Therapeutic exercise; Endurance training;Patient/family training;Equipment eval/education; Bed mobility;Gait training;Spoke to nursing;OT   Progress Progressing toward goals   PT Frequency Other (Comment)  (5/wk)   Recommendation   PT Discharge Recommendation Post acute rehabilitation services   AM-PAC Basic Mobility Inpatient   Turning in Bed Without Bedrails 2   Lying on Back to Sitting on Edge of Flat Bed 2   Moving Bed to Chair 2   Standing Up From Chair 3   Walk in Room 2   Climb 3-5 Stairs 1   Basic Mobility Inpatient Raw Score 12   Basic Mobility Standardized Score 32 23   Highest Level Of Mobility   -[facility] Goal 4: Move to chair/commode   End of Consult   Patient Position at End of Consult Bedside chair;Bed/Chair alarm activated; All needs within reach   Licensure   [address] Number  Jeanette Dayna Falcon PT  [phone]

## 2022-03-05 NOTE — PLAN OF CARE
Problem: MOBILITY - ADULT  Goal: Maintain or return to baseline ADL function  Description: INTERVENTIONS:  -  Assess patient's ability to carry out ADLs; assess patient's baseline for ADL function and identify physical deficits which impact ability to perform ADLs (bathing, care of mouth/teeth, toileting, grooming, dressing, etc )  - Assess/evaluate cause of self-care deficits   - Assess range of motion  - Assess patient's mobility; develop plan if impaired  - Assess patient's need for assistive devices and provide as appropriate  - Encourage maximum independence but intervene and supervise when necessary  - Involve family in performance of ADLs  - Assess for home care needs following discharge   - Consider OT consult to assist with ADL evaluation and planning for discharge  - Provide patient education as appropriate  Outcome: Progressing  Goal: Maintains/Returns to pre admission functional level  Description: INTERVENTIONS:  - Perform BMAT or MOVE assessment daily    - Set and communicate daily mobility goal to care team and patient/family/caregiver     - Collaborate with rehabilitation services on mobility goals if consulted  - Out of bed for toileting  - Record patient progress and toleration of activity level   Outcome: Progressing     Problem: Prexisting or High Potential for Compromised Skin Integrity  Goal: Skin integrity is maintained or improved  Description: INTERVENTIONS:  - Identify patients at risk for skin breakdown  - Assess and monitor skin integrity  - Assess and monitor nutrition and hydration status  - Monitor labs   - Assess for incontinence   - Turn and reposition patient  - Assist with mobility/ambulation  - Relieve pressure over bony prominences  - Avoid friction and shearing  - Provide appropriate hygiene as needed including keeping skin clean and dry  - Evaluate need for skin moisturizer/barrier cream  - Collaborate with interdisciplinary team   - Patient/family teaching  - Consider wound care consult   Outcome: Progressing     Problem: Potential for Falls  Goal: Patient will remain free of falls  Description: INTERVENTIONS:  - Educate patient/family on patient safety including physical limitations  - Instruct patient to call for assistance with activity   - Consult OT/PT to assist with strengthening/mobility   - Keep Call bell within reach  - Keep bed low and locked with side rails adjusted as appropriate  - Keep care items and personal belongings within reach  - Initiate and maintain comfort rounds  - Make Fall Risk Sign visible to staff  - Apply yellow socks and bracelet for high fall risk patients  - Consider moving patient to room near nurses station  Outcome: Progressing     Problem: Nutrition/Hydration-ADULT  Goal: Nutrient/Hydration intake appropriate for improving, restoring or maintaining nutritional needs  Description: Monitor and assess patient's nutrition/hydration status for malnutrition  Collaborate with interdisciplinary team and initiate plan and interventions as ordered  Monitor patient's weight and dietary intake as ordered or per policy  Utilize nutrition screening tool and intervene as necessary  Determine patient's food preferences and provide high-protein, high-caloric foods as appropriate       INTERVENTIONS:  - Monitor oral intake, urinary output, labs, and treatment plans  - Assess nutrition and hydration status and recommend course of action  - Evaluate amount of meals eaten  - Assist patient with eating if necessary   - Allow adequate time for meals  - Recommend/ encourage appropriate diets, oral nutritional supplements, and vitamin/mineral supplements  - Order, calculate, and assess calorie counts as needed  - Recommend, monitor, and adjust tube feedings and TPN/PPN based on assessed needs  - Assess need for intravenous fluids  - Provide specific nutrition/hydration education as appropriate  - Include patient/family/caregiver in decisions related to nutrition  Outcome: Progressing

## 2022-03-05 NOTE — ASSESSMENT & PLAN NOTE
Potassium level upon admission was 5 7 which increased to 6 6  Patient not on any medications that can cause hyperkalemia  Patient will be placed on low-potassium diet  Patient was given insulin with D10, nebulizer treatment and Kayexalate 30 grams this morning  Potassium level improved to 5 6 and was given another dose of Kayexalate with improved potassium to 5 4 today  Patient was previously on torsemide  Patient received another dose of Kayexalate with improved potassium level    Potassium level has been stable and normal  Continue low-potassium diet

## 2022-03-05 NOTE — OCCUPATIONAL THERAPY NOTE
Occupational Therapy cancel note        03/05/22 0935   OT Last Visit   OT Visit Date 03/05/22   Note Type   Note Type Cancelled Session   Cancel Reasons Other  (pt unavailable receiving bedside care   will re-attempt as able)       Esmer Sky MS, OTR/L [id number]

## 2022-03-05 NOTE — ASSESSMENT & PLAN NOTE
Lab Results   Component Value Date    HGBA1C 8 2 (H) 03/02/2022   Patient can resume Toujeo insulin 40 units b i d  And Humalog 5 units with meals  Can continue Humalog sliding scale  Blood sugars have been running low  Lantus dose was decreased to 30 units b i d

## 2022-03-05 NOTE — PROGRESS NOTES
[facility] [id number]  Progress Note Ana Hyman 1950, 73 y o  female MRN: [id number]  Unit/Bed#: [address] [place] [address] Encounter: [id number]  Primary Care Provider: Marcia Chavez   Date and time admitted to hospital: 3/1/2022 10:20 AM    Hyperkalemia  Assessment & Plan  Potassium level upon admission was 5 7 which increased to 6 6  Patient not on any medications that can cause hyperkalemia  Patient will be placed on low-potassium diet  Patient was given insulin with D10, nebulizer treatment and Kayexalate 30 grams this morning  Potassium level improved to 5 6 and was given another dose of Kayexalate with improved potassium to 5 4 today  Patient was previously on torsemide  Patient received another dose of Kayexalate with improved potassium level    Potassium level has been stable and normal  Continue low-potassium diet    Anemia  Assessment & Plan  Patient's baseline hemoglobin is between 8 and 9  Hemoglobin today 6 8  Likely secondary to acute blood loss during surgery  Patient received 1 unit of PRBC transfusion with improved hemoglobin to 7 8  Patient ordered for 1 more unit of PRBC transfusion but had to be held off at the current time as patient has O negative blood which is on short supply     * Closed 4-part fracture of proximal humerus, left, initial encounter  Assessment & Plan  Patient sustained a left proximal humerus fracture after a fall  Patient underwent left shoulder reverse arthroplasty by Orthopedics POD# 4  Further care per orthopedics  Encourage incentive spirometry, pain management  PT/OT  Continue Tylenol for mild pain and Percocet for moderate pain    Acute renal failure superimposed on chronic kidney disease [facility])  Assessment & Plan  Lab Results   Component Value Date    EGFR 24 03/05/2022    EGFR 21 03/04/2022    EGFR 22 03/03/2022    CREATININE 2 02 (H) 03/05/2022    CREATININE 2 30 (H) 03/04/2022    CREATININE 2 16 (H) 03/03/2022   History of CKD stage 3  Baseline creatinine around 1 2-1 4  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  Creatinine continues to get worse postoperatively and has increased to 2 3  PVRs were unremarkable  Avoid nephrotoxic agents and hypotension  Patient received IV hydration and Nephrology was consulted due to worsening creatinine  Etiology felt to be secondary to ATN and drop in hemoglobin  Patient ordered for 1 unit of PRBC transfusion  Fluids were discontinued and patient received a dose of albumin on March 4, 2022  Creatinine has improved to 2  CK level is normal    Paroxysmal atrial fibrillation [facility])  Assessment & Plan  Patient has known history of paroxysmal l atrial fibrillation patient currently in sinus rhythm  Continue metoprolol 50 milligram p o  B i d  And anticoagulation with Eliquis  Patient follows up with Dr Natasha Finley  Patient continues to remain in sinus rhythm  Continue to hold Eliquis  Will restart Eliquis if hemoglobin continues to remain stable    Constipation  Assessment & Plan  Patient did not have a bowel movement in few days  Patient is on Colace and MiraLax  Added on senna  Patient was given a classic positive and had a very small bowel movement  Ordered for magnesium citrate 146 milliliters today    Diabetes mellitus, type 2 [facility])  Assessment & Plan    Lab Results   Component Value Date    HGBA1C 8 2 (H) 03/02/2022   Patient can resume Toujeo insulin 40 units b i d  And Humalog 5 units with meals  Can continue Humalog sliding scale  Blood sugars have been running low  Lantus dose was decreased to 30 units b i d           VTE Pharmacologic Prophylaxis:   High Risk (Score >/= 5) - Pharmacological DVT Prophylaxis Ordered: heparin  Sequential Compression Devices Ordered  Patient Centered Rounds: I performed bedside rounds with nursing staff today    Discussions with Specialists or Other Care Team Provider: Dr Leonard Taylor and Discussions with Family / Patient: Updated  (niece) via phone  Time Spent for Care: 45 minutes  More than 50% of total time spent on counseling and coordination of care as described above  Current Length of Stay: 2 day(s)  Current Patient Status: Inpatient   Certification Statement: The patient will continue to require additional inpatient hospital stay due to Acute kidney injury, anemia  Discharge Plan: Anticipate discharge in 24-48 hrs to rehab facility  Code Status: Level 1 - Full Code    Subjective:   Patient had a very small bowel movement after receiving Dulcolax suppository  Patient does complain of some left shoulder pain  Denies any chest pain, shortness of breath  Objective:     Vitals:   Temp (24hrs), Av 2 °F (36 8 °C), Min:97 6 °F (36 4 °C), Max:99 °F (37 2 °C)    Temp:  [97 6 °F (36 4 °C)-99 °F (37 2 °C)] 98 °F (36 7 °C)  HR:  [71-84] 75  Resp:  [16-20] 20  BP: (140-153)/(58-69) 146/67  SpO2:  [95 %-98 %] 95 %  Body mass index is 51 2 kg/m²  Input and Output Summary (last 24 hours): Intake/Output Summary (Last 24 hours) at 3/5/2022 1445  Last data filed at 3/4/2022 1514  Gross per 24 hour   Intake 350 ml   Output --   Net 350 ml       Physical Exam:   Physical Exam  Constitutional:       Appearance: Normal appearance  HENT:      Head: Normocephalic and atraumatic  Nose: Nose normal       Mouth/Throat:      Mouth: Mucous membranes are moist       Pharynx: Oropharynx is clear  Eyes:      Extraocular Movements: Extraocular movements intact  Pupils: Pupils are equal, round, and reactive to light  Cardiovascular:      Rate and Rhythm: Normal rate and regular rhythm  Pulmonary:      Effort: Pulmonary effort is normal       Breath sounds: Normal breath sounds  Abdominal:      General: Bowel sounds are normal  There is no distension  Palpations: Abdomen is soft  Tenderness: There is no abdominal tenderness  Musculoskeletal:         General: No swelling        Cervical back: Normal range of motion and neck supple  Comments: Left shoulder in sling   Skin:     General: Skin is warm and dry  Comments: Bruising on the left chest wall and left upper arm   Neurological:      General: No focal deficit present  Mental Status: She is alert  Additional Data:     Labs:  Results from last 7 days   Lab Units 03/05/22  0558   WBC Thousand/uL 9 34   HEMOGLOBIN g/dL 7 8*   HEMATOCRIT % 25 9*   PLATELETS Thousands/uL 320   NEUTROS PCT % 68   LYMPHS PCT % 15   MONOS PCT % 11   EOS PCT % 4     Results from last 7 days   Lab Units 03/05/22  0558 03/02/22  0551 03/01/22  1246   SODIUM mmol/L 139   < > 139   POTASSIUM mmol/L 4 4   < > 5 7*   CHLORIDE mmol/L 103   < > 107   CO2 mmol/L 28   < > 28   BUN mg/dL 47*   < > 24   CREATININE mg/dL 2 02*   < > 1 03   ANION GAP mmol/L 8   < > 4   CALCIUM mg/dL 7 7*   < > 8 4   ALBUMIN g/dL  --   --  2 8*   TOTAL BILIRUBIN mg/dL  --   --  0 77   ALK PHOS U/L  --   --  69   ALT U/L  --   --  23   AST U/L  --   --  23   GLUCOSE RANDOM mg/dL 62*   < > 74    < > = values in this interval not displayed           Results from last 7 days   Lab Units 03/05/22  1128 03/05/22  0753 03/05/22  0553 03/05/22  0310 03/05/22  0148 03/05/22  0038 03/05/22  0004 03/04/22  2108 03/04/22  1628 03/04/22  1119 03/04/22  0715 03/03/22  2126   POC GLUCOSE mg/dl 93 78 69 74 101 77 45* 99 74 159* 84 176*     Results from last 7 days   Lab Units 03/02/22  0009   HEMOGLOBIN A1C % 8 2*           Lines/Drains:  Invasive Devices  Report    Peripheral Intravenous Line            Peripheral IV 03/04/22 Distal;Right;Upper;Ventral (anterior) Arm 1 day                      Imaging: Reviewed radiology reports from this admission including: Shoulder x-ray    Recent Cultures (last 7 days):         Last 24 Hours Medication List:   Current Facility-Administered Medications   Medication Dose Route Frequency Provider Last Rate    acetaminophen  650 mg Oral Q6H PRN Zane Elmore PA-C      albuterol  2 puff Inhalation Q6H PRN Mi Dubon, MICHELLE      amLODIPine  5 mg Oral Daily Belinda Hodges MD      bisacodyl  10 mg Rectal Daily PRN Ariel Auguste MD      dextrose  50 mL Intravenous Once Geni MICHELLE Peterson      docusate sodium  100 mg Oral BID Mi Dubon, MICHELLE      HYDROcodone-acetaminophen  1 tablet Oral Q4H PRN Ally Malloy MD      insulin glargine  30 Units Subcutaneous Q12H [address] RK Edwards      insulin lispro  2-12 Units Subcutaneous TID AC Zaira Lou PA-C      insulin lispro  5 Units Subcutaneous TID With Meals Ariel Auguste MD      loratadine  10 mg Oral Daily Mi Dubon, MICHELLE      magnesium citrate  148 mL Oral Once Ariel Auguste MD      metoprolol tartrate  50 mg Oral Q12H [address] Mi Dubon, MICHELLE      montelukast  10 mg Oral HS Mi Dubon PA-C      nystatin   Topical BID Mi Dubon PA-C      ondansetron  4 mg Intravenous Q6H PRN Mi Dubon, MICHELLE      polyethylene glycol  17 g Oral Daily Ariel Auguste MD      pravastatin  80 mg Oral Daily With Bubba Barrett PA-C      pregabalin  100 mg Oral BID Mi Dubon, MICHELLE      senna  1 tablet Oral HS Ariel Auguste MD          Today, Patient Was Seen By: Ariel Auguste MD    **Please Note: This note may have been constructed using a voice recognition system  **

## 2022-03-05 NOTE — ASSESSMENT & PLAN NOTE
Patient's baseline hemoglobin is between 8 and 9  Hemoglobin today 6 8  Likely secondary to acute blood loss during surgery  Patient received 1 unit of PRBC transfusion with improved hemoglobin to 7 8  Patient ordered for 1 more unit of PRBC transfusion but had to be held off at the current time as patient has O negative blood which is on short supply

## 2022-03-06 PROBLEM — E87.5 HYPERKALEMIA: Status: RESOLVED | Noted: 2022-03-02 | Resolved: 2022-03-06

## 2022-03-06 LAB
ANION GAP SERPL CALCULATED.3IONS-SCNC: 6 MMOL/L (ref 4–13)
BUN SERPL-MCNC: 49 MG/DL (ref 5–25)
CALCIUM SERPL-MCNC: 7.9 MG/DL (ref 8.3–10.1)
CHLORIDE SERPL-SCNC: 105 MMOL/L (ref 100–108)
CO2 SERPL-SCNC: 31 MMOL/L (ref 21–32)
CREAT SERPL-MCNC: 2.02 MG/DL (ref 0.6–1.3)
GFR SERPL CREATININE-BSD FRML MDRD: 24 ML/MIN/1.73SQ M
GLUCOSE SERPL-MCNC: 108 MG/DL (ref 65–140)
GLUCOSE SERPL-MCNC: 115 MG/DL (ref 65–140)
GLUCOSE SERPL-MCNC: 42 MG/DL (ref 65–140)
GLUCOSE SERPL-MCNC: 65 MG/DL (ref 65–140)
GLUCOSE SERPL-MCNC: 73 MG/DL (ref 65–140)
GLUCOSE SERPL-MCNC: 74 MG/DL (ref 65–140)
GLUCOSE SERPL-MCNC: 78 MG/DL (ref 65–140)
GLUCOSE SERPL-MCNC: 92 MG/DL (ref 65–140)
HCT VFR BLD AUTO: 21.2 % (ref 34.8–46.1)
HCT VFR BLD AUTO: 24.7 % (ref 34.8–46.1)
HGB BLD-MCNC: 6 G/DL (ref 11.5–15.4)
HGB BLD-MCNC: 7.3 G/DL (ref 11.5–15.4)
POTASSIUM SERPL-SCNC: 3.9 MMOL/L (ref 3.5–5.3)
SODIUM SERPL-SCNC: 142 MMOL/L (ref 136–145)

## 2022-03-06 PROCEDURE — P9016 RBC LEUKOCYTES REDUCED: HCPCS

## 2022-03-06 PROCEDURE — 85014 HEMATOCRIT: CPT | Performed by: INTERNAL MEDICINE

## 2022-03-06 PROCEDURE — 30233N1 TRANSFUSION OF NONAUTOLOGOUS RED BLOOD CELLS INTO PERIPHERAL VEIN, PERCUTANEOUS APPROACH: ICD-10-PCS | Performed by: ORTHOPAEDIC SURGERY

## 2022-03-06 PROCEDURE — 80048 BASIC METABOLIC PNL TOTAL CA: CPT | Performed by: INTERNAL MEDICINE

## 2022-03-06 PROCEDURE — 99232 SBSQ HOSP IP/OBS MODERATE 35: CPT | Performed by: INTERNAL MEDICINE

## 2022-03-06 PROCEDURE — 97530 THERAPEUTIC ACTIVITIES: CPT

## 2022-03-06 PROCEDURE — 82948 REAGENT STRIP/BLOOD GLUCOSE: CPT

## 2022-03-06 PROCEDURE — 85018 HEMOGLOBIN: CPT | Performed by: INTERNAL MEDICINE

## 2022-03-06 PROCEDURE — 99024 POSTOP FOLLOW-UP VISIT: CPT | Performed by: PHYSICIAN ASSISTANT

## 2022-03-06 RX ORDER — INSULIN GLARGINE 100 [IU]/ML
20 INJECTION, SOLUTION SUBCUTANEOUS EVERY 12 HOURS SCHEDULED
Status: DISCONTINUED | OUTPATIENT
Start: 2022-03-06 | End: 2022-03-07

## 2022-03-06 RX ADMIN — MONTELUKAST 10 MG: 10 TABLET, FILM COATED ORAL at 22:03

## 2022-03-06 RX ADMIN — LORATADINE 10 MG: 10 TABLET ORAL at 08:52

## 2022-03-06 RX ADMIN — POLYETHYLENE GLYCOL 3350 17 G: 17 POWDER, FOR SOLUTION ORAL at 08:52

## 2022-03-06 RX ADMIN — SENNOSIDES 8.6 MG: 8.6 TABLET, FILM COATED ORAL at 22:03

## 2022-03-06 RX ADMIN — HYDROCODONE BITARTRATE AND ACETAMINOPHEN 1 TABLET: 5; 325 TABLET ORAL at 10:41

## 2022-03-06 RX ADMIN — PREGABALIN 100 MG: 100 CAPSULE ORAL at 08:53

## 2022-03-06 RX ADMIN — DOCUSATE SODIUM 100 MG: 100 CAPSULE ORAL at 17:54

## 2022-03-06 RX ADMIN — METOPROLOL TARTRATE 50 MG: 50 TABLET, FILM COATED ORAL at 08:52

## 2022-03-06 RX ADMIN — NYSTATIN: 100000 POWDER TOPICAL at 08:53

## 2022-03-06 RX ADMIN — PRAVASTATIN SODIUM 80 MG: 80 TABLET ORAL at 16:26

## 2022-03-06 RX ADMIN — NYSTATIN: 100000 POWDER TOPICAL at 17:54

## 2022-03-06 RX ADMIN — AMLODIPINE BESYLATE 5 MG: 5 TABLET ORAL at 08:52

## 2022-03-06 RX ADMIN — DOCUSATE SODIUM 100 MG: 100 CAPSULE ORAL at 08:52

## 2022-03-06 RX ADMIN — HEPARIN SODIUM 5000 UNITS: 5000 INJECTION INTRAVENOUS; SUBCUTANEOUS at 22:03

## 2022-03-06 RX ADMIN — HEPARIN SODIUM 5000 UNITS: 5000 INJECTION INTRAVENOUS; SUBCUTANEOUS at 05:11

## 2022-03-06 RX ADMIN — HEPARIN SODIUM 5000 UNITS: 5000 INJECTION INTRAVENOUS; SUBCUTANEOUS at 16:27

## 2022-03-06 RX ADMIN — Medication 16 G: at 21:57

## 2022-03-06 RX ADMIN — METOPROLOL TARTRATE 50 MG: 50 TABLET, FILM COATED ORAL at 22:21

## 2022-03-06 RX ADMIN — PREGABALIN 100 MG: 100 CAPSULE ORAL at 17:54

## 2022-03-06 RX ADMIN — INSULIN GLARGINE 30 UNITS: 100 INJECTION, SOLUTION SUBCUTANEOUS at 08:52

## 2022-03-06 NOTE — PROGRESS NOTES
NEPHROLOGY PROGRESS NOTE   Andrew Ricketts 73 y o  female MRN: [id number]  Unit/Bed#: [address] [place] [address] Encounter: [id number]    ASSESSMENT & PLAN:  73 y o  female with history of chronic kidney disease stage 3, diabetes mellitus type 2, CKD follows with Lorena Matti but was last seen in office in 2019 by advanced practitioner, hypertension, chronic diastolic CHF, paroxysmal atrial fibrillation who was admitted to [facility] for  left humerus fracture and underwent left shoulder reverse arthroplasty on 03/01/2022  Nephrology consulted for acute kidney injury    Acute kidney injury  -Baseline creatinine:  1 1-1 5 mg/dl  -Admission creatinine:  1 03 mg/dl  - Work up:   · UA with microscopy:  Ordered  Previous UA with 1+ protein and 0-1 RBCs  -Etiology:  Acute kidney injury likely due to postop ATN hemodynamic changes as well as drop in hemoglobin requiring PRBC on 03/04   [facility] Course:  renal function improving from peak creatinine 2 3 to creatinine 2 0 today  Bladder scan 282 mL on 3/1    -Plan:   · Renal function improving to creatinine 2 0 and stable for last 2 days, stable electrolytes  Currently receiving PRBC  Expect gradual improvement in renal function  Okay for outpatient care from Nephrology side if no other indication to stay in the hospital   Will arrange for outpatient follow-up once discharged  · No indication for renal replacement therapy  · Avoid nephrotoxins and dose all medications per EGFR  · Avoid hypotension                                              Chronic Kidney Disease Stage 3  -Outpatient Nephrologist: Dr Ha Larsen  -Baseline Creatinine: 1 1-1 5    Recent acute kidney injury in February 2022, creatinine improved from peak 3 3 to creatinine 1 6 on 02/20   -Etiology:  Chronic kidney disease likely due to diabetic nephropathy, hypertensive nephrosclerosis, sequelae of SEBASTIAN  -Avoid Nephrotoxins and Dose all medications per eGFR  -Will need outpatient follow up after discharge      BP/hypertension  -blood pressure was low and so dose of amlodipine was decreased to 5 mg daily with hold parameters on 03/04, blood pressure currently stable and at goal, continue current dose of amlodipine  Continue metoprolol     Hyperkalemia:  Potassium was 5 7 on admission  -peak potassium was 6 6, received medical treatment and last potassium was 3 9, continue to monitor  Recommend low-potassium diet  CK level was 102 on 03/05     Anemia unspecified  -status post PRBC on 03/04 for hemoglobin 6 8   -hemoglobin improved and again trended down to 6 0 on 03/06, currently receiving PRBC  Continue to monitor per primary team       Left humerus fracture, status post left shoulder reverse arthroplasty on 03/01, continue postop care per Orthopedics     Others:  Paroxysmal atrial fibrillation on metoprolol  Eliquis currently on hold due to anemia and drop in hemoglobin/diabetes mellitus type 2 on insulin management per primary team/obstructive sleep apnea     Discussed with SLIM  SUBJECTIVE:  No new complaints  No chest pain or shortness of breath, no nausea vomiting  Receiving PRBC    OBJECTIVE:  Current Weight: Weight - Scale: 127 kg (279 lb 15 8 oz)  Vitals:    03/06/22 1211   BP: 132/57   Pulse: 76   Resp: 18   Temp: 98 2 °F (36 8 °C)   SpO2: 96%     No intake or output data in the 24 hours ending 03/06/22 1244    Physical Exam  General:  Ill looking, awake  Obese  Eyes: Conjunctivae pink,  Sclera anicteric  ENT: lips and mucous membranes moist  Neck: supple   Chest:  Decreased air entry bilateral lung bases  CVS: S1 & S2 present, normal rate, regular rhythm, no murmur  Abdomen: soft, non-tender, non-distended, Bowel sounds normoactive  Extremities: no edema of  legs    Dressing over left shoulder  Skin: no rash  Neuro: awake, alert, oriented x 3   Psych: Mood and affect appropriate     Medications:    Current Facility-Administered Medications:     acetaminophen (TYLENOL) tablet 650 mg, 650 mg, Oral, Q6H PRN, Huma Ellis MICHELLE, 650 mg at 03/05/22 0906    albuterol (PROVENTIL HFA,VENTOLIN HFA) inhaler 2 puff, 2 puff, Inhalation, Q6H PRN, Nessa Cramer PA-C, 2 puff at 03/04/22 1843    amLODIPine (NORVASC) tablet 5 mg, 5 mg, Oral, Daily, Spenser Huddleston MD, 5 mg at 03/06/22 7280    bisacodyl (DULCOLAX) rectal suppository 10 mg, 10 mg, Rectal, Daily PRN, Tan Greer MD, 10 mg at 03/05/22 1046    dextrose 50 % IV solution 50 mL, 50 mL, Intravenous, Once, Fernanda Rojas PA-C    docusate sodium (COLACE) capsule 100 mg, 100 mg, Oral, BID, Nessa Cramer PA-C, 100 mg at 03/06/22 5538    heparin (porcine) subcutaneous injection 5,000 Units, 5,000 Units, Subcutaneous, Q8H [address], Tan Greer MD, 5,000 Units at 03/06/22 0511    HYDROcodone-acetaminophen (NORCO) 5-325 mg per tablet 1 tablet, 1 tablet, Oral, Q4H PRN, Jose Santillan MD, 1 tablet at 03/06/22 1041    insulin glargine (LANTUS) subcutaneous injection 20 Units 0 2 mL, 20 Units, Subcutaneous, Q12H [address], Georgi Dye MD    insulin lispro (HumaLOG) 100 units/mL subcutaneous injection 2-12 Units, 2-12 Units, Subcutaneous, TID AC, 2 Units at 03/04/22 1250 **AND** Fingerstick Glucose (POCT), , , TID AC, Maddy Priest PA-C    loratadine (CLARITIN) tablet 10 mg, 10 mg, Oral, Daily, Nessa Cramer PA-C, 10 mg at 03/06/22 7279    metoprolol tartrate (LOPRESSOR) tablet 50 mg, 50 mg, Oral, Q12H [address], Nessa Cramer PA-C, 50 mg at 03/06/22 4522    montelukast (SINGULAIR) tablet 10 mg, 10 mg, Oral, HS, Nessa Cramer PA-C, 10 mg at 03/05/22 2157    nystatin (MYCOSTATIN) powder, , Topical, BID, Nessa Cramer PA-C, Given at 03/06/22 0853    ondansetron (ZOFRAN) injection 4 mg, 4 mg, Intravenous, Q6H PRN, Nessa Cramer PA-C    polyethylene glycol (MIRALAX) packet 17 g, 17 g, Oral, Daily, Tan Greer MD, 17 g at 03/06/22 0852    pravastatin (PRAVACHOL) tablet 80 mg, 80 mg, Oral, Daily With Louie Mohs, PA-C, 80 mg at 03/05/22 1715    pregabalin (LYRICA) capsule 100 mg, 100 mg, Oral, BID, Huma Ellis PA-C, 100 mg at 03/06/22 0481    senna (SENOKOT) tablet 8 6 mg, 1 tablet, Oral, HS, Johnathan Ohara MD, 8 6 mg at 03/05/22 2157    Invasive Devices:        Lab Results:   Results from last 7 days   Lab Units 03/06/22  0919 03/06/22  0510 03/05/22  0558 03/04/22  0515 03/02/22  1439 03/02/22  0551 03/01/22  1246 03/01/22  1246   WBC Thousand/uL  --   --  9 34 10 68*  --  11 72*  --   --    HEMOGLOBIN g/dL 6 0*  --  7 8* 6 8*  --  8 1*   < >  --    HEMATOCRIT % 21 2*  --  25 9* 23 2*  --  27 7*   < >  --    PLATELETS Thousands/uL  --   --  320 263  --  271  --   --    POTASSIUM mmol/L  --  3 9 4 4 4 1   < > 6 6*   < > 5 7*   CHLORIDE mmol/L  --  105 103 104   < > 106   < > 107   CO2 mmol/L  --  31 28 27   < > 27   < > 28   BUN mg/dL  --  49* 47* 47*   < > 26*   < > 24   CREATININE mg/dL  --  2 02* 2 02* 2 30*   < > 1 31*   < > 1 03   CALCIUM mg/dL  --  7 9* 7 7* 6 9*   < > 7 6*   < > 8 4   ALK PHOS U/L  --   --   --   --   --   --   --  69   ALT U/L  --   --   --   --   --   --   --  23   AST U/L  --   --   --   --   --   --   --  23    < > = values in this interval not displayed  Previous work up:         Portions of the record may have been created with voice recognition software  Occasional wrong word or "sound a like" substitutions may have occurred due to the inherent limitations of voice recognition software  Read the chart carefully and recognize, using context, where substitutions have occurred  If you have any questions, please contact the dictating provider

## 2022-03-06 NOTE — ASSESSMENT & PLAN NOTE
Patient has known history of paroxysmal l atrial fibrillation patient currently in sinus rhythm  Continue metoprolol 50 milligram p o  B i d  And anticoagulation with Eliquis  Patient follows up with Dr Ti Rojo  Patient continues to remain in sinus rhythm  Continue to hold Eliquis as hemoglobin continues to drop

## 2022-03-06 NOTE — PROGRESS NOTES
Progress Note - Orthopedics   Aleyda Guard 73 y o  female MRN: [id number]  Unit/Bed#: [address] [place] [address]      Subjective:    73 y  o female status post left reverse total shoulder arthroplasty for fracture on 03/01/2022 with Dr Main Marshall No acute events, no complaints  Pt doing well  Pain controlled   Denies fevers chills, CP, SOB    Labs:  0   Lab Value Date/Time    HCT 25 9 (L) 03/05/2022 0558    HCT 23 2 (L) 03/04/2022 0515    HCT 27 7 (L) 03/02/2022 0551    HGB 7 8 (L) 03/05/2022 0558    HGB 6 8 (LL) 03/04/2022 0515    HGB 8 1 (L) 03/02/2022 0551    INR 1 13 02/16/2022 2335    WBC 9 34 03/05/2022 0558    WBC 10 68 (H) 03/04/2022 0515    WBC 11 72 (H) 03/02/2022 0551       Meds:    Current Facility-Administered Medications:     acetaminophen (TYLENOL) tablet 650 mg, 650 mg, Oral, Q6H PRN, Kishan Rubio PA-C, 650 mg at 03/05/22 0906    albuterol (PROVENTIL HFA,VENTOLIN HFA) inhaler 2 puff, 2 puff, Inhalation, Q6H PRN, Kishan Rubio PA-C, 2 puff at 03/04/22 1843    amLODIPine (NORVASC) tablet 5 mg, 5 mg, Oral, Daily, Britni Quijano MD, 5 mg at 03/06/22 6712    bisacodyl (DULCOLAX) rectal suppository 10 mg, 10 mg, Rectal, Daily PRN, Roxana Serna MD, 10 mg at 03/05/22 1046    dextrose 50 % IV solution 50 mL, 50 mL, Intravenous, Once, Fernanda MICHELLE Rojas    docusate sodium (COLACE) capsule 100 mg, 100 mg, Oral, BID, Kishan Rubio PA-C, 100 mg at 03/06/22 8746    heparin (porcine) subcutaneous injection 5,000 Units, 5,000 Units, Subcutaneous, Q8H [facility], Roxana Serna MD, 5,000 Units at 03/06/22 0511    HYDROcodone-acetaminophen (NORCO) 5-325 mg per tablet 1 tablet, 1 tablet, Oral, Q4H PRN, Mortimer Fix, MD, 1 tablet at 03/05/22 1818    insulin glargine (LANTUS) subcutaneous injection 30 Units 0 3 mL, 30 Units, Subcutaneous, Q12H [facility], [place] RK Fernandez, 30 Units at 03/06/22 0852    insulin lispro (HumaLOG) 100 units/mL subcutaneous injection 2-12 Units, 2-12 Units, Subcutaneous, TID AC, 2 Units at 03/04/22 1250 **AND** Fingerstick Glucose (POCT), , , TID AC, Shira Berrios PA-C    loratadine (CLARITIN) tablet 10 mg, 10 mg, Oral, Daily, Avril Culver PA-C, 10 mg at 03/06/22 1335    metoprolol tartrate (LOPRESSOR) tablet 50 mg, 50 mg, Oral, Q12H [address], Avril Abiola, PA-C, 50 mg at 03/06/22 7770    montelukast (SINGULAIR) tablet 10 mg, 10 mg, Oral, HS, Avril Culver, PA-C, 10 mg at 03/05/22 2157    nystatin (MYCOSTATIN) powder, , Topical, BID, GEE Moreno-C, Given at 03/05/22 1716    ondansetron [facility]) injection 4 mg, 4 mg, Intravenous, Q6H PRN, GEE Moreno-C    polyethylene glycol (MIRALAX) packet 17 g, 17 g, Oral, Daily, Tim Dye MD, 17 g at 03/06/22 1432    pravastatin (PRAVACHOL) tablet 80 mg, 80 mg, Oral, Daily With Liang Correa PA-C, 80 mg at 03/05/22 1715    pregabalin (LYRICA) capsule 100 mg, 100 mg, Oral, BID, Avril Culver PA-C, 100 mg at 03/05/22 1715    senna (SENOKOT) tablet 8 6 mg, 1 tablet, Oral, HS, Sebas Rios MD, 8 6 mg at 03/05/22 2157    Blood Culture:   Lab Results   Component Value Date    BLOODCX No Growth After 5 Days  05/25/2019       Wound Culture:   No results found for: WOUNDCULT    Ins and Outs:  No intake/output data recorded  Physical:  Vitals:    03/06/22 0757   BP: 132/59   Pulse: 80   Resp: 19   Temp: 99 1 °F (37 3 °C)   SpO2: 97%     Musculoskeletal: left Upper Extremity  · Skin no erythema  Mild ecchymosis about the upper arm  · Dressing clean dry and intact  · TTP about the upper arm and shoulder  · SILT m/r/u  Motor intact ain/pin/m/r/u, 2+ radial pulse      Assessment:    73 y  o female status post left reverse total shoulder arthroplasty  Plan:  · MOHAN CLARK  · Patient has chronic anemia received 1 unit of PRBC yesterday  Await H&H this morning  · Patient has had elevated creatinine    She is stable at 2 02 today  · PT/OT  · Pain control  · Anticoagulation has been held due to her anemia per Medicine  · Await labs this morning    Will discuss with Medicine for possible discharge to rehab today versus tomorrow due to her ongoing medical issues    Shea Pham PA-C

## 2022-03-06 NOTE — ASSESSMENT & PLAN NOTE
Patient did not have a bowel movement in few days  Patient is on Colace and MiraLax  Added on senna  Patient also on Dulcolax suppository p r n    Patient had a very large bowel movement yesterday  Continue bowel regimen

## 2022-03-06 NOTE — ASSESSMENT & PLAN NOTE
Lab Results   Component Value Date    EGFR 24 03/06/2022    EGFR 24 03/05/2022    EGFR 21 03/04/2022    CREATININE 2 02 (H) 03/06/2022    CREATININE 2 02 (H) 03/05/2022    CREATININE 2 30 (H) 03/04/2022   History of CKD stage 3  Baseline creatinine around 1 2-1 4  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  Creatinine continues to get worse postoperatively and has increased to 2 3  PVRs were unremarkable  Avoid nephrotoxic agents and hypotension  Patient received IV hydration and Nephrology was consulted due to worsening creatinine  Etiology felt to be secondary to ATN and drop in hemoglobin  Patient received 1 unit of PRBC transfusion for anemia  Fluids were discontinued and patient received a dose of albumin on March 4, 2022   Creatinine has improved and has been stable around 2  CK level is normal

## 2022-03-06 NOTE — ASSESSMENT & PLAN NOTE
Lab Results   Component Value Date    HGBA1C 8 2 (H) 03/02/2022   Patient can resume Toujeo insulin 40 units b i d  And Humalog 5 units with meals  Can continue Humalog sliding scale  Blood sugars continued to remain on the low range  Lantus dose will be decreased to 20 units b i d

## 2022-03-06 NOTE — PHYSICAL THERAPY NOTE
PT cancellation     03/06/22 1330   PT Last Visit   PT Visit Date 03/06/22   Note Type   Note Type Cancelled Session   Cancel Reasons Other  (Pt getting blood; will follow )   Licensure   [place] License Number  Jorden Carrion PT  [id number]

## 2022-03-06 NOTE — ASSESSMENT & PLAN NOTE
Patient sustained a left proximal humerus fracture after a fall  Patient underwent left shoulder reverse arthroplasty by Orthopedics POD# 5  Further care per orthopedics  Encourage incentive spirometry, pain management  PT/OT  Continue Tylenol for mild pain and Percocet for moderate pain

## 2022-03-06 NOTE — PROGRESS NOTES
[place] 47  Progress Note Colton Landa 1950, 73 y o  female MRN: [id number]  Unit/Bed#: [address] [place] [address] Encounter: [id number]  Primary Care Provider: Farida Mulligan   Date and time admitted to hospital: 3/1/2022 10:20 AM    Anemia  Assessment & Plan  Patient's baseline hemoglobin is between 8 and 9  Hemoglobin today 6 8  Likely secondary to acute blood loss during surgery  Will check stool occult blood to rule out GI blood loss  Patient received 1 unit of PRBC transfusion with improved hemoglobin to 7 8 but dropped again to 6  Patient ordered for 2 units of PRBC transfusion but will be given 1 unit due to short supply of O-negative blood followed by H&H    * Closed 4-part fracture of proximal humerus, left, initial encounter  Assessment & Plan  Patient sustained a left proximal humerus fracture after a fall  Patient underwent left shoulder reverse arthroplasty by Orthopedics POD# 5  Further care per orthopedics  Encourage incentive spirometry, pain management  PT/OT  Continue Tylenol for mild pain and Percocet for moderate pain    Hyperkalemia-resolved as of 3/6/2022  Assessment & Plan  Potassium level upon admission was 5 7 which increased to 6 6  Patient not on any medications that can cause hyperkalemia  Patient will be placed on low-potassium diet  Patient was given insulin with D10, nebulizer treatment and Kayexalate 30 grams this morning  Potassium level improved to 5 6 and was given another dose of Kayexalate with improved potassium to 5 4 today  Patient was previously on torsemide  Patient received another dose of Kayexalate with improved potassium level    Potassium level has been stable and normal  Continue low-potassium diet    Acute renal failure superimposed on chronic kidney disease [facility])  Assessment & Plan  Lab Results   Component Value Date    EGFR 24 03/06/2022    EGFR 24 03/05/2022    EGFR 21 03/04/2022    CREATININE 2 02 (H) 03/06/2022    CREATININE 2 02 (H) 03/05/2022 CREATININE 2 30 (H) 03/04/2022   History of CKD stage 3  Baseline creatinine around 1 2-1 4  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  Creatinine continues to get worse postoperatively and has increased to 2 3  PVRs were unremarkable  Avoid nephrotoxic agents and hypotension  Patient received IV hydration and Nephrology was consulted due to worsening creatinine  Etiology felt to be secondary to ATN and drop in hemoglobin  Patient received 1 unit of PRBC transfusion for anemia  Fluids were discontinued and patient received a dose of albumin on March 4, 2022  Creatinine has improved and has been stable around 2  CK level is normal    Paroxysmal atrial fibrillation [facility])  Assessment & Plan  Patient has known history of paroxysmal l atrial fibrillation patient currently in sinus rhythm  Continue metoprolol 50 milligram p o  B i d  And anticoagulation with Eliquis  Patient follows up with Dr Loretta Mehta  Patient continues to remain in sinus rhythm  Continue to hold Eliquis as hemoglobin continues to drop  Constipation  Assessment & Plan  Patient did not have a bowel movement in few days  Patient is on Colace and MiraLax  Added on senna  Patient also on Dulcolax suppository p r n  Patient had a very large bowel movement yesterday  Continue bowel regimen    Diabetes mellitus, type 2 [facility])  Assessment & Plan    Lab Results   Component Value Date    HGBA1C 8 2 (H) 03/02/2022   Patient can resume Toujeo insulin 40 units b i d  And Humalog 5 units with meals  Can continue Humalog sliding scale  Blood sugars continued to remain on the low range  Lantus dose will be decreased to 20 units b i d           VTE Pharmacologic Prophylaxis:   High Risk (Score >/= 5) - Pharmacological DVT Prophylaxis Ordered: heparin  Sequential Compression Devices Ordered  Patient Centered Rounds: I performed bedside rounds with nursing staff today    Discussions with Specialists or Other Care Team Provider: Yes    Education and Discussions with Family / Patient: yes    Time Spent for Care: 45 minutes  More than 50% of total time spent on counseling and coordination of care as described above  Current Length of Stay: 3 day(s)  Current Patient Status: Inpatient   Certification Statement: The patient will continue to require additional inpatient hospital stay due to Anemia  Discharge Plan: Anticipate discharge in 24-48 hrs to rehab facility  Code Status: Level 1 - Full Code    Subjective:   Patient continues to complain of left shoulder pain  Patient had a very large bowel movement yesterday  Denies any abdominal pain, chest pain or shortness of breath  Objective:     Vitals:   Temp (24hrs), Av 2 °F (36 8 °C), Min:97 8 °F (36 6 °C), Max:99 1 °F (37 3 °C)    Temp:  [97 8 °F (36 6 °C)-99 1 °F (37 3 °C)] 99 1 °F (37 3 °C)  HR:  [70-81] 80  Resp:  [18-20] 19  BP: ()/(52-59) 132/59  SpO2:  [97 %-100 %] 97 %  Body mass index is 51 2 kg/m²  Input and Output Summary (last 24 hours):   No intake or output data in the 24 hours ending 22 1114    Physical Exam:   Physical Exam  Constitutional:       Appearance: Normal appearance  HENT:      Head: Normocephalic and atraumatic  Nose: Nose normal       Mouth/Throat:      Mouth: Mucous membranes are moist       Pharynx: Oropharynx is clear  Eyes:      Extraocular Movements: Extraocular movements intact  Pupils: Pupils are equal, round, and reactive to light  Cardiovascular:      Rate and Rhythm: Normal rate and regular rhythm  Pulmonary:      Effort: Pulmonary effort is normal       Breath sounds: Normal breath sounds  Abdominal:      General: Bowel sounds are normal  There is no distension  Palpations: Abdomen is soft  Tenderness: There is no abdominal tenderness  Musculoskeletal:         General: No swelling  Cervical back: Normal range of motion and neck supple        Comments: Left shoulder in sling sling     Skin:     General: Skin is warm and dry  Comments: Bruising in the left upper chest wall and left arm   Neurological:      General: No focal deficit present  Mental Status: She is alert  Additional Data:     Labs:  Results from last 7 days   Lab Units 03/06/22  0919 03/05/22  0558 03/05/22  0558   WBC Thousand/uL  --   --  9 34   HEMOGLOBIN g/dL 6 0*   < > 7 8*   HEMATOCRIT % 21 2*   < > 25 9*   PLATELETS Thousands/uL  --   --  320   NEUTROS PCT %  --   --  68   LYMPHS PCT %  --   --  15   MONOS PCT %  --   --  11   EOS PCT %  --   --  4    < > = values in this interval not displayed  Results from last 7 days   Lab Units 03/06/22  0510 03/02/22  0551 03/01/22  1246   SODIUM mmol/L 142   < > 139   POTASSIUM mmol/L 3 9   < > 5 7*   CHLORIDE mmol/L 105   < > 107   CO2 mmol/L 31   < > 28   BUN mg/dL 49*   < > 24   CREATININE mg/dL 2 02*   < > 1 03   ANION GAP mmol/L 6   < > 4   CALCIUM mg/dL 7 9*   < > 8 4   ALBUMIN g/dL  --   --  2 8*   TOTAL BILIRUBIN mg/dL  --   --  0 77   ALK PHOS U/L  --   --  69   ALT U/L  --   --  23   AST U/L  --   --  23   GLUCOSE RANDOM mg/dL 92   < > 74    < > = values in this interval not displayed           Results from last 7 days   Lab Units 03/06/22  1103 03/06/22  0727 03/06/22  0229 03/05/22  2057 03/05/22  1622 03/05/22  1128 03/05/22  0753 03/05/22  0553 03/05/22  0310 03/05/22  0148 03/05/22  0038 03/05/22  0004   POC GLUCOSE mg/dl 78 73 115 119 78 93 78 69 74 101 77 45*     Results from last 7 days   Lab Units 03/02/22  0009   HEMOGLOBIN A1C % 8 2*           Lines/Drains:  Invasive Devices  Report    Peripheral Intravenous Line            Peripheral IV 03/04/22 Distal;Right;Upper;Ventral (anterior) Arm 1 day                      Imaging: Reviewed radiology reports from this admission including: chest xray    Recent Cultures (last 7 days):         Last 24 Hours Medication List:   Current Facility-Administered Medications   Medication Dose Route Frequency Provider Last Rate    acetaminophen  650 mg Oral Q6H PRN Fredy Lease, PA-C      albuterol  2 puff Inhalation Q6H PRN Fredy Lease, PA-C      amLODIPine  5 mg Oral Daily Kizzy Linda MD      bisacodyl  10 mg Rectal Daily PRN Chin Patel MD      dextrose  50 mL Intravenous Once Eladio MICHELLE Boudreaux      docusate sodium  100 mg Oral BID Fredy Lease, PA-C      heparin (porcine)  5,000 Units Subcutaneous Q8H [address] Chin Patel MD      HYDROcodone-acetaminophen  1 tablet Oral Q4H PRN Reena Thomas MD      insulin glargine  20 Units Subcutaneous Q12H [address] Georgi Dye MD      insulin lispro  2-12 Units Subcutaneous TID AC GEE Ferrer-NAKITA      loratadine  10 mg Oral Daily Fredy Lease, PA-C      metoprolol tartrate  50 mg Oral Q12H [address] Fredy Lease, PA-C      montelukast  10 mg Oral HS Fredy Lease, PA-C      nystatin   Topical BID Fredy Lease, PA-C      ondansetron  4 mg Intravenous Q6H PRN Fredy Lease, PA-C      polyethylene glycol  17 g Oral Daily Chin Patel MD      pravastatin  80 mg Oral Daily With Marcha Hamman, PA-C      pregabalin  100 mg Oral BID Fredy Lease, PA-NAKITA      senna  1 tablet Oral HS Chin Patel MD          Today, Patient Was Seen By: Chin Patel MD    **Please Note: This note may have been constructed using a voice recognition system  **

## 2022-03-06 NOTE — ASSESSMENT & PLAN NOTE
Patient's baseline hemoglobin is between 8 and 9  Hemoglobin today 6 8  Likely secondary to acute blood loss during surgery    Will check stool occult blood to rule out GI blood loss  Patient received 1 unit of PRBC transfusion with improved hemoglobin to 7 8 but dropped again to 6  Patient ordered for 2 units of PRBC transfusion but will be given 1 unit due to short supply of O-negative blood followed by H&H

## 2022-03-06 NOTE — PHYSICAL THERAPY NOTE
PT TREATMENT     03/06/22 1400   PT Last Visit   PT Visit Date 03/06/22   Note Type   Note Type Treatment   Restrictions/Precautions   Weight Bearing Precautions Per Order Yes   LUE Weight Bearing Per Order NWB   Braces or Orthoses Other (Comment)  (abduction brace: left shoulder)   Other Precautions Chair Alarm; Bed Alarm;O2;Fall Risk;Pain   General   Chart Reviewed Yes   Family/Caregiver Present No   Cognition   Overall Cognitive Status WFL   Arousal/Participation Cooperative   Attention Within functional limits   Following Commands Follows multistep commands with increased time or repetition   Subjective   Subjective Pt wanted to use the commode before going to the chair   Bed Mobility   Supine to Sit 3  Moderate assistance   Additional items Assist x 2;Verbal cues; Increased time required   Additional Comments to chair   Transfers   Sit to Stand 3  Moderate assistance   Additional items Assist x 1;Verbal cues   Stand to Sit 3  Moderate assistance   Additional items Assist x 1;Verbal cues   Ambulation/Elevation   Gait pattern Step to; Wide ONUR; Short stride   Gait Assistance 4  Minimal assist   Additional items Assist x 1;Verbal cues   Assistive Device Adarsh-walker   Distance 3 feet x 2    Activity Tolerance   Activity Tolerance Patient limited by pain; Patient limited by fatigue   Nurse Made Aware yes : Quang Elizabeth present during transfer    Assessment   Prognosis Good   Problem List Decreased strength;Decreased endurance; Impaired balance;Decreased mobility; Decreased coordination;Decreased skin integrity;Orthopedic restrictions;Pain   Assessment Pt requires a lot of asssit to go from supine < > short sit due to no use of LUE   Pt brace presents no on correctly  abduction block is on the outside of the sling, not against her body  Pt is positioned with several green wedges instead  In sitting pt's sling was re positoned correctly  Pt doing better with transfers sit <> stand : more like min A today with verbal cues  Cont  to progress as tolerated  The patient's AM-PAC Basic Mobility Inpatient Short Form Raw Score is 13  A Raw score of less than or equal to 16 suggests the patient may benefit from discharge to post-acute rehabilitation services  Please also refer to the recommendation of the Physical Therapist for safe discharge planning  Goals   Patient Goals to get into chair again  (was in chair earlier prior to getting blood)   Plan   Treatment/Interventions ADL retraining;Functional transfer training;LE strengthening/ROM; Therapeutic exercise; Endurance training;Patient/family training;Equipment eval/education; Bed mobility;Gait training;Spoke to nursing   Progress Progressing toward goals   PT Frequency Other (Comment)  (5/w)   Recommendation   PT Discharge Recommendation Post acute rehabilitation services   AM-PAC Basic Mobility Inpatient   Turning in Bed Without Bedrails 2   Lying on Back to Sitting on Edge of Flat Bed 2   Moving Bed to Chair 2   Standing Up From Chair 3   Walk in Room 2   Climb 3-5 Stairs 2   Basic Mobility Inpatient Raw Score 13   Basic Mobility Standardized Score 33 99   Turning Head Towards Sound 4   Follow Simple Instructions 3   Low Function Basic Mobility Raw Score 20   Low Function Basic Mobility Standardized Score 32 8   Highest Level Of Mobility   JH-HLM Goal 4: Move to chair/commode   JH-HLM Highest Level of Mobility 6: Walk 10 steps or more   JH-HLM Goal Achieved Yes   End of Consult   Patient Position at End of Consult Bedside chair;Bed/Chair alarm activated; All needs within reach   End of Consult Comments in wheelchair   Licensure   [facility] Number  [facility] PT  [id number]

## 2022-03-07 ENCOUNTER — PATIENT OUTREACH (OUTPATIENT)
Dept: CASE MANAGEMENT | Facility: OTHER | Age: 72
End: 2022-03-07

## 2022-03-07 VITALS
TEMPERATURE: 97.4 F | BODY MASS INDEX: 51.52 KG/M2 | WEIGHT: 279.98 LBS | SYSTOLIC BLOOD PRESSURE: 164 MMHG | OXYGEN SATURATION: 96 % | RESPIRATION RATE: 17 BRPM | HEART RATE: 76 BPM | HEIGHT: 62 IN | DIASTOLIC BLOOD PRESSURE: 69 MMHG

## 2022-03-07 LAB
ABO GROUP BLD BPU: NORMAL
ANION GAP SERPL CALCULATED.3IONS-SCNC: 7 MMOL/L (ref 4–13)
BPU ID: NORMAL
BUN SERPL-MCNC: 48 MG/DL (ref 5–25)
CALCIUM SERPL-MCNC: 8.4 MG/DL (ref 8.3–10.1)
CHLORIDE SERPL-SCNC: 106 MMOL/L (ref 100–108)
CO2 SERPL-SCNC: 30 MMOL/L (ref 21–32)
CREAT SERPL-MCNC: 1.52 MG/DL (ref 0.6–1.3)
CROSSMATCH: NORMAL
ERYTHROCYTE [DISTWIDTH] IN BLOOD BY AUTOMATED COUNT: 15.9 % (ref 11.6–15.1)
GFR SERPL CREATININE-BSD FRML MDRD: 34 ML/MIN/1.73SQ M
GLUCOSE SERPL-MCNC: 50 MG/DL (ref 65–140)
GLUCOSE SERPL-MCNC: 54 MG/DL (ref 65–140)
GLUCOSE SERPL-MCNC: 68 MG/DL (ref 65–140)
GLUCOSE SERPL-MCNC: 89 MG/DL (ref 65–140)
HCT VFR BLD AUTO: 29.5 % (ref 34.8–46.1)
HEMOCCULT STL QL: NEGATIVE
HEMOCCULT STL QL: NORMAL
HEMOCCULT STL QL: NORMAL
HGB BLD-MCNC: 8.8 G/DL (ref 11.5–15.4)
MCH RBC QN AUTO: 28.6 PG (ref 26.8–34.3)
MCHC RBC AUTO-ENTMCNC: 29.8 G/DL (ref 31.4–37.4)
MCV RBC AUTO: 96 FL (ref 82–98)
PLATELET # BLD AUTO: 297 THOUSANDS/UL (ref 149–390)
PMV BLD AUTO: 9.8 FL (ref 8.9–12.7)
POTASSIUM SERPL-SCNC: 4.3 MMOL/L (ref 3.5–5.3)
RBC # BLD AUTO: 3.08 MILLION/UL (ref 3.81–5.12)
SODIUM SERPL-SCNC: 143 MMOL/L (ref 136–145)
UNIT DISPENSE STATUS: NORMAL
UNIT PRODUCT CODE: NORMAL
UNIT PRODUCT VOLUME: 350 ML
UNIT RH: NORMAL
WBC # BLD AUTO: 6.85 THOUSAND/UL (ref 4.31–10.16)

## 2022-03-07 PROCEDURE — 97110 THERAPEUTIC EXERCISES: CPT

## 2022-03-07 PROCEDURE — 80048 BASIC METABOLIC PNL TOTAL CA: CPT | Performed by: INTERNAL MEDICINE

## 2022-03-07 PROCEDURE — 82948 REAGENT STRIP/BLOOD GLUCOSE: CPT

## 2022-03-07 PROCEDURE — 85027 COMPLETE CBC AUTOMATED: CPT | Performed by: INTERNAL MEDICINE

## 2022-03-07 PROCEDURE — 82272 OCCULT BLD FECES 1-3 TESTS: CPT | Performed by: INTERNAL MEDICINE

## 2022-03-07 PROCEDURE — 99024 POSTOP FOLLOW-UP VISIT: CPT | Performed by: PHYSICIAN ASSISTANT

## 2022-03-07 PROCEDURE — 99232 SBSQ HOSP IP/OBS MODERATE 35: CPT | Performed by: INTERNAL MEDICINE

## 2022-03-07 RX ORDER — INSULIN GLARGINE 100 [IU]/ML
20 INJECTION, SOLUTION SUBCUTANEOUS
Status: DISCONTINUED | OUTPATIENT
Start: 2022-03-07 | End: 2022-03-07 | Stop reason: HOSPADM

## 2022-03-07 RX ORDER — POLYETHYLENE GLYCOL 3350 17 G/17G
17 POWDER, FOR SOLUTION ORAL DAILY
Refills: 0
Start: 2022-03-08 | End: 2022-03-25

## 2022-03-07 RX ORDER — INSULIN GLARGINE 100 [IU]/ML
20 INJECTION, SOLUTION SUBCUTANEOUS
Qty: 10 ML | Refills: 0
Start: 2022-03-07 | End: 2022-03-25

## 2022-03-07 RX ADMIN — ALBUTEROL SULFATE 2 PUFF: 90 AEROSOL, METERED RESPIRATORY (INHALATION) at 02:36

## 2022-03-07 RX ADMIN — METOPROLOL TARTRATE 50 MG: 50 TABLET, FILM COATED ORAL at 08:45

## 2022-03-07 RX ADMIN — HYDROCODONE BITARTRATE AND ACETAMINOPHEN 1 TABLET: 5; 325 TABLET ORAL at 02:50

## 2022-03-07 RX ADMIN — POLYETHYLENE GLYCOL 3350 17 G: 17 POWDER, FOR SOLUTION ORAL at 08:45

## 2022-03-07 RX ADMIN — PREGABALIN 100 MG: 100 CAPSULE ORAL at 08:45

## 2022-03-07 RX ADMIN — NYSTATIN: 100000 POWDER TOPICAL at 09:25

## 2022-03-07 RX ADMIN — HEPARIN SODIUM 5000 UNITS: 5000 INJECTION INTRAVENOUS; SUBCUTANEOUS at 05:35

## 2022-03-07 RX ADMIN — DOCUSATE SODIUM 100 MG: 100 CAPSULE ORAL at 08:45

## 2022-03-07 RX ADMIN — HYDROCODONE BITARTRATE AND ACETAMINOPHEN 1 TABLET: 5; 325 TABLET ORAL at 08:40

## 2022-03-07 RX ADMIN — HEPARIN SODIUM 5000 UNITS: 5000 INJECTION INTRAVENOUS; SUBCUTANEOUS at 14:58

## 2022-03-07 RX ADMIN — ONDANSETRON 4 MG: 2 INJECTION INTRAMUSCULAR; INTRAVENOUS at 09:26

## 2022-03-07 RX ADMIN — LORATADINE 10 MG: 10 TABLET ORAL at 08:45

## 2022-03-07 RX ADMIN — AMLODIPINE BESYLATE 5 MG: 5 TABLET ORAL at 08:45

## 2022-03-07 NOTE — PROGRESS NOTES
Progress Note - Orthopedics   Davye File 73 y o  female MRN: [id number]  Unit/Bed#: [address] [place] [address] Encounter: [id number]      Subjective:  Status post left reverse total shoulder replacement for fracture performed 6 days ago  The patient notes that her pain has been relatively well controlled about the left shoulder  She notes good sensation of the left upper extremity  She denies any chest pain, dizziness or shortness of breath today  Her hemoglobin is up to 8 8 and creatinine down to 1 5  No acute overnight events  Vitals: Blood pressure 164/69, pulse 76, temperature (!) 97 4 °F (36 3 °C), temperature source Oral, resp  rate 17, height 5' 2 01" (1 575 m), weight 127 kg (279 lb 15 8 oz), SpO2 96 %, not currently breastfeeding  ,Body mass index is 51 2 kg/m²  Intake/Output Summary (Last 24 hours) at 3/7/2022 1252  Last data filed at 3/7/2022 0310  Gross per 24 hour   Intake 350 ml   Output 1275 ml   Net -925 ml       Invasive Devices  Report    Peripheral Intravenous Line            Peripheral IV 03/04/22 Distal;Right;Upper;Ventral (anterior) Arm 3 days                Ortho Exam: Alert and oriented X 3 lying comfortably in bed    Dressing removed, incision clean dry and intact  No erythema appreciated     Swelling left shoulder with ecchymosis  Sensation intact axillay, median, ulnar, and radial nerve distributions on operative extremity  5/5 strength EPL, FPL, APB, and first dorsal interosseous of operative extremity  2+radial pulse  Lab, Imaging and other studies: I have personally reviewed pertinent lab results      CBC:   Lab Results   Component Value Date    WBC 6 85 03/07/2022    HGB 8 8 (L) 03/07/2022    HCT 29 5 (L) 03/07/2022    MCV 96 03/07/2022     03/07/2022    ADJUSTEDWBC 10 70 06/30/2016    MCH 28 6 03/07/2022    MCHC 29 8 (L) 03/07/2022    RDW 15 9 (H) 03/07/2022    MPV 9 8 03/07/2022    NRBC 0 03/05/2022     CMP:   Lab Results   Component Value Date     03/07/2022    CO2 30 03/07/2022    BUN 48 (H) 03/07/2022    CREATININE 1 52 (H) 03/07/2022    CALCIUM 8 4 03/07/2022    AST 23 03/01/2022    ALT 23 03/01/2022    ALKPHOS 69 03/01/2022    EGFR 34 03/07/2022     PT/INR:   Lab Results   Component Value Date    INR 1 13 02/16/2022                   Assessment:  Status post left reverse total shoulder replacement    Plan:  Patient is doing well from a shoulder perspective and appears to be medically optimized for discharge back to acute rehab at this time, per Internal Medicine  I did remove the patient's dressing today, with no signs of infection  She may leave her incision open to air at this time  She will remain in her sling at all times except for showering and dressing  She will follow up in the office in approximately 1 week for repeat evaluation  Please see discharge instructions for further details

## 2022-03-07 NOTE — ASSESSMENT & PLAN NOTE
Patient has known history of paroxysmal l atrial fibrillation patient currently in sinus rhythm  Continue metoprolol 50 milligram p o  B i d  And anticoagulation with Eliquis  [facility]  Patient continues to remain in sinus rhythm  Patient's Eliquis was on hold during hospitalization due to anemia  Resume Eliquis

## 2022-03-07 NOTE — PROGRESS NOTES
Pastoral Care Progress Note    3/7/2022  Patient: Aleyda Lovett : 1950  Admission Date & Time: 3/1/2022 1020  MRN: [id number] [facility]: [id number]                     Chaplaincy Interventions Utilized:   Relationship Building: Cultivated a relationship of care and support  Patient said she is trying to keep a positive attitude, although it has been difficult to do so  She is hoping that rehab for her shoulder goes very well and then she can go back home  She welcomed a prayer for nate, strength and healing     Ritual: Provided prayer     22 1500   Clinical Encounter Type   Visited With Patient   Routine Visit Introduction   Referral To   (census/rounds)   Moravian Encounters   [facility] Needs [facility] Text;Prayer

## 2022-03-07 NOTE — [place] UNIVERSAL TRANSFER FORM
[place] UNIV[place]AL TRANSFER FORM  (ALL ITEMS MUST BE COMPLETED)    1  TRANSFER FROM: [address]      TRANSFER TO: [facility]       2  DATE OF TRANSFER: 3/7/2022                        TIME OF TRANSFER: 1500     3  PATIENT NAME: Marylou Kat,        YOB: 1950                             GENDER: female    4  LANGUAGE:   English    5  PHYSICIAN NAME:  Yajaira Pal*                   PHONE: [phone]    6  CODE STATUS: Level 1 - Full Code        Out of Hospital DNR Attached: No    7  :                                      :  Extended Emergency Contact Information  Primary Emergency Contact: Laurel Orozco   [facility]  Mobile Phone: [phone]  Relation: Relative           Health Care Representative/Proxy:  Yes           Legal Guardian:  No             NAME OF:           HEALTH CARE REPRESENTATIVE/PROXY:                                         OR           LEGAL GUARDIAN, IF NOT :                                               PHONE:  (Day)           (Night)                        (Cell)    8  REASON FOR TRANSFER: (Must include brief medical history and recent changes in physical function or cognition )             V/S: /69 (BP Location: Right arm)   Pulse 76   Temp (!) 97 4 °F (36 3 °C) (Oral)   Resp 17   Ht 5' 2 01" (1 575 m)   Wt 127 kg (279 lb 15 8 oz)   SpO2 96%   BMI 51 20 kg/m²           PAIN: None    9  PRIMARY DIAGNOSIS: Closed 4-part fracture of proximal humerus, left, initial encounter      Secondary Diagnosis:         Pacemaker: No      Internal Defib: No          Mental Health Diagnosis (if Applicable):    10  RESTRAINTS: No     11  RESPIRATORY NEEDS: None    12  ISOLATION/PRECAUTION: None    13   ALLERGY: Penicillins, Moxifloxacin, Percocet [oxycodone-acetaminophen], Zinc acetate, Nuts - food allergy, Asa [aspirin], Indocin [indomethacin], and Other    14  SENSORY:       Vision Good    15  SKIN CONDITION: Yes:  Surgical    16  DIET: Special (describe)Ephraim/CHO controlled; Potassium 2 gm     17  IV ACCESS: None    18  PERSONAL ITEMS SENT WITH PATIENT: Melani    23  ATTACHED DOCUMENTS: MUST ATTACH CURRENT MEDICATION INFORMATION Face Sheet and Medication Reconciliation    20  AT RISK ALERTS:Falls        HARM TO: N/A    21  WEIGHT BEARING STATUS:         Left Leg: None        Right Leg: None    22  MENTAL STATUS:Alert    23  FUNCTION:        Walk: With Help        Transfer: With Help        Toilet: Self        Feed: Self    24  IMMUNIZATIONS/SCREENING:     There is no immunization history on file for this patient  25  BOWEL: Date Last BM3/7/22    32  BLADDER: Continent    27   SENDING FACILITY CONTACT: Lynette Mesa                  Title: RN        Unit:         Phone: [phone] [address] (if known):        Title:        Unit:         Phone:         FORM PREFILLED BY (if applicable)       Title:       Unit:        Phone:         FORM COMPLETED BY Lynette Mesa RN      Title: LUISITO      Phone: [phone]

## 2022-03-07 NOTE — CASE MANAGEMENT
Case Management Discharge Planning Note    Patient name Sd Enriquez  Location [address] MRN [id number]  : 1950 Date 3/7/2022       Current Admission Date: 3/1/2022  Current Admission Diagnosis:Closed 4-part fracture of proximal humerus, left, initial encounter   Patient Active Problem List    Diagnosis Date Noted    Constipation 2022    Closed 4-part fracture of proximal humerus, left, initial encounter 2022    PONV (postoperative nausea and vomiting) 2022    Acute renal failure superimposed on chronic kidney disease ([facility] [address] ) 2022    Diabetes mellitus, type 2 ([facility] [address] ) 2022    Gastroesophageal reflux disease 2022    Nephrolithiasis 2022    Arthritis 2022    S/P total knee replacement, right 2022    On continuous oral anticoagulation - eliquis 2022    Humeral head fracture 2022    Essential hypertension 2019    Anemia 2019    Mixed hyperlipidemia 2019    Paroxysmal atrial fibrillation ([facility] [address] ) 2019    Lower leg edema 2019    Asthma 2018    Morbid obesity with BMI of 45 0-49 9, adult ([facility] [address] ) 2018      LOS (days): 4  Geometric Mean LOS (GMLOS) (days): 1 40  Days to GMLOS:-2 8     OBJECTIVE:  Risk of Unplanned Readmission Score: 20     Current admission status: Inpatient   Preferred Pharmacy:   [address] [place] [address], [address] [place] [id number]  Phone: [phone] Fax: [phone]    Primary Care Provider: Francisco Jones    Primary Insurance: MEDICARE  Secondary Insurance: [employer]    DISCHARGE DETAILS:    Discharge planning discussed with[de-identified] Patient  Freedom of Choice: Yes  Comments - Freedom of Choice: SW following to assist with DCP  PT was admitted from [facility] for shoulder surgery and pt's request has been to return to [facility] upon discharge    SW met with pt to review plan and pt confirmed same  CM contacted family/caregiver?: Yes (notified sister of possible discharge today)    Contacts  Patient Contacts: Hilario Atwood  Relationship to Patient[de-identified] Family (sister)  Contact Method: Phone  Phone Number: [phone]  Reason/Outcome: Discharge Planning    Other Referral/Resources/Interventions Provided:  Interventions: Short Term Rehab  Referral Comments: Clinical update sent to [facility] to prepare for pt's return    Treatment Team Recommendation: Short Term Rehab  Discharge Destination Plan[de-identified] Short Term Rehab  Transport at Discharge : Wheelchair Stephania roberts (pt has bariatric wheelchair from facility)    IMM Given (Date):: 03/07/22  IMM Given to[de-identified] Patient (IMM reviewed with pt  Pt verbalized understanding and expressed desire to talk with physicians before discharge  Dr Vinod Holloway notified of request   Pt signed IMM with her initials and copy given    Copy also placed in scan bin for chart )

## 2022-03-07 NOTE — ASSESSMENT & PLAN NOTE
Patient sustained a left proximal humerus fracture after a fall  Patient underwent left shoulder reverse arthroplasty by Orthopedics POD# 6  Further care per orthopedics  Encourage incentive spirometry, pain management  PT/OT  Continue Tylenol for mild pain and Percocet for moderate pain

## 2022-03-07 NOTE — OCCUPATIONAL THERAPY NOTE
Occupational Therapy Attempt Note       03/07/22 1130   Note Type   Note Type Treatment   Cancel Reasons Other  (Pt eating lunch requesting OT return later this PM, will f/u)   Licensure   [place] License Number  Shamir Celeste OTR/L [id number]

## 2022-03-07 NOTE — ASSESSMENT & PLAN NOTE
Patient's baseline hemoglobin is between 8 and 9  Likely secondary to acute blood loss during surgery  Will check stool occult blood to rule out GI blood loss  Patient received 1 unit of PRBC transfusion with improved hemoglobin to 7 8 but dropped again to 6  Patient ordered for 2 units of PRBC transfusion but will be given 1 unit due to short supply of O-negative blood followed by H&H    Patient received 1 unit of PRBC transfusion with improved hemoglobin to 8 8  Stool occult blood was negative

## 2022-03-07 NOTE — ASSESSMENT & PLAN NOTE
Lab Results   Component Value Date    HGBA1C 8 2 (H) 03/02/2022   Patient can resume Toujeo insulin 40 units b i d  And Humalog 5 units with meals  Can continue Humalog sliding scale  Patient has been running hypoglycemic    Lantus dose decreased to 20 units q h s   Monitor blood sugars and adjust insulin as needed

## 2022-03-07 NOTE — PLAN OF CARE
Problem: MOBILITY - ADULT  Goal: Maintain or return to baseline ADL function  Description: INTERVENTIONS:  -  Assess patient's ability to carry out ADLs; assess patient's baseline for ADL function and identify physical deficits which impact ability to perform ADLs (bathing, care of mouth/teeth, toileting, grooming, dressing, etc )  - Assess/evaluate cause of self-care deficits   - Assess range of motion  - Assess patient's mobility; develop plan if impaired  - Assess patient's need for assistive devices and provide as appropriate  - Encourage maximum independence but intervene and supervise when necessary  - Involve family in performance of ADLs  - Assess for home care needs following discharge   - Consider OT consult to assist with ADL evaluation and planning for discharge  - Provide patient education as appropriate  Outcome: Progressing  Goal: Maintains/Returns to pre admission functional level  Description: INTERVENTIONS:  - Perform BMAT or MOVE assessment daily    - Set and communicate daily mobility goal to care team and patient/family/caregiver  - Collaborate with rehabilitation services on mobility goals if consulted  - Perform Range of Motion  times a day  - Reposition patient every  hours    - Dangle patient  times a day  - Stand patient  times a day  - Ambulate patient  times a day  - Out of bed to chair  times a day   - Out of bed for meals  times a day  - Out of bed for toileting  - Record patient progress and toleration of activity level   Outcome: Progressing     Problem: Prexisting or High Potential for Compromised Skin Integrity  Goal: Skin integrity is maintained or improved  Description: INTERVENTIONS:  - Identify patients at risk for skin breakdown  - Assess and monitor skin integrity  - Assess and monitor nutrition and hydration status  - Monitor labs   - Assess for incontinence   - Turn and reposition patient  - Assist with mobility/ambulation  - Relieve pressure over bony prominences  - Avoid friction and shearing  - Provide appropriate hygiene as needed including keeping skin clean and dry  - Evaluate need for skin moisturizer/barrier cream  - Collaborate with interdisciplinary team   - Patient/family teaching  - Consider wound care consult   Outcome: Progressing     Problem: Potential for Falls  Goal: Patient will remain free of falls  Description: INTERVENTIONS:  - Educate patient/family on patient safety including physical limitations  - Instruct patient to call for assistance with activity   - Consult OT/PT to assist with strengthening/mobility   - Keep Call bell within reach  - Keep bed low and locked with side rails adjusted as appropriate  - Keep care items and personal belongings within reach  - Initiate and maintain comfort rounds  - Make Fall Risk Sign visible to staff  - Offer Toileting every  Hours, in advance of need  - Initiate/Maintain alarm  - Obtain necessary fall risk management equipment:   - Apply yellow socks and bracelet for high fall risk patients  - Consider moving patient to room near nurses station  Outcome: Progressing     Problem: Nutrition/Hydration-ADULT  Goal: Nutrient/Hydration intake appropriate for improving, restoring or maintaining nutritional needs  Description: Monitor and assess patient's nutrition/hydration status for malnutrition  Collaborate with interdisciplinary team and initiate plan and interventions as ordered  Monitor patient's weight and dietary intake as ordered or per policy  Utilize nutrition screening tool and intervene as necessary  Determine patient's food preferences and provide high-protein, high-caloric foods as appropriate       INTERVENTIONS:  - Monitor oral intake, urinary output, labs, and treatment plans  - Assess nutrition and hydration status and recommend course of action  - Evaluate amount of meals eaten  - Assist patient with eating if necessary   - Allow adequate time for meals  - Recommend/ encourage appropriate diets, oral nutritional supplements, and vitamin/mineral supplements  - Order, calculate, and assess calorie counts as needed  - Recommend, monitor, and adjust tube feedings and TPN/PPN based on assessed needs  - Assess need for intravenous fluids  - Provide specific nutrition/hydration education as appropriate  - Include patient/family/caregiver in decisions related to nutrition  Outcome: Progressing

## 2022-03-07 NOTE — PROGRESS NOTES
[facility] [address]  Progress Note [address] 1950, 73 y o  female MRN: [id number]  Unit/Bed#: [address] [place] [address] Encounter: [id number]  Primary Care Provider: Gregoria Mabry   Date and time admitted to hospital: 3/1/2022 10:20 AM    Anemia  Assessment & Plan  Patient's baseline hemoglobin is between 8 and 9  Likely secondary to acute blood loss during surgery  Will check stool occult blood to rule out GI blood loss  Patient received 1 unit of PRBC transfusion with improved hemoglobin to 7 8 but dropped again to 6  Patient ordered for 2 units of PRBC transfusion but will be given 1 unit due to short supply of O-negative blood followed by H&H  Patient received 1 unit of PRBC transfusion with improved hemoglobin to 8 8  Stool occult blood was negative    * Closed 4-part fracture of proximal humerus, left, initial encounter  Assessment & Plan  Patient sustained a left proximal humerus fracture after a fall  Patient underwent left shoulder reverse arthroplasty by Orthopedics POD# 6  Further care per orthopedics  Encourage incentive spirometry, pain management  PT/OT  Continue Tylenol for mild pain and Percocet for moderate pain    Acute renal failure superimposed on chronic kidney disease [facility])  Assessment & Plan  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    Creatinine continues to get worse postoperatively and has increased to 2 3  PVRs were unremarkable  Avoid nephrotoxic agents and hypotension  Patient received IV hydration and Nephrology was consulted due to worsening creatinine  Etiology felt to be secondary to ATN and drop in hemoglobin  Patient received 1 unit of PRBC transfusion for anemia  Fluids were discontinued and patient received a dose of albumin on 2022  Creatinine has improved and has been stable around 2  CK level is normal  Creatinine has improved significantly and is close to baseline  Outpatient follow-up with Nephrology    Paroxysmal atrial fibrillation [facility])  Assessment & Plan  Patient has known history of paroxysmal l atrial fibrillation patient currently in sinus rhythm  Continue metoprolol 50 milligram p o  B i d  And anticoagulation with Eliquis  Patient follows up with Dr Ti Rojo  Patient continues to remain in sinus rhythm  Patient's Eliquis was on hold during hospitalization due to anemia  Resume Eliquis  Diabetes mellitus, type 2 [facility])  Assessment & Plan    Lab Results   Component Value Date    HGBA1C 8 2 (H) 2022   Patient can resume Toujeo insulin 40 units b i d  And Humalog 5 units with meals  Can continue Humalog sliding scale  Patient has been running hypoglycemic  Lantus dose decreased to 20 units q h s   Monitor blood sugars and adjust insulin as needed          VTE Pharmacologic Prophylaxis:   High Risk (Score >/= 5) - Pharmacological DVT Prophylaxis Ordered: apixaban (Eliquis)  Sequential Compression Devices Ordered  Patient Centered Rounds: I performed bedside rounds with nursing staff today  Discussions with Specialists or Other Care Team Provider: Lainey Andrew    Education and Discussions with Family / Patient: yes    Time Spent for Care: 45 minutes  More than 50% of total time spent on counseling and coordination of care as described above  Current Length of Stay: 4 day(s)  Current Patient Status: Inpatient       Code Status: Level 1 - Full Code    Subjective:   Patient had some nausea this morning  Still having some shoulder pain  Did not have a bowel movement yesterday      Objective:     Vitals:   Temp (24hrs), Av 1 °F (36 7 °C), Min:97 4 °F (36 3 °C), Max:98 8 °F (37 1 °C)    Temp:  [97 4 °F (36 3 °C)-98 8 °F (37 1 °C)] 97 4 °F (36 3 °C)  HR:  [70-83] 76  Resp:  [17-20] 17  BP: (132-176)/(56-90) 164/69  SpO2:  [94 %-98 %] 96 %  Body mass index is 51 2 kg/m²  Input and Output Summary (last 24 hours): Intake/Output Summary (Last 24 hours) at 3/7/2022 1149  Last data filed at 3/7/2022 0310  Gross per 24 hour   Intake 350 ml   Output 1275 ml   Net -925 ml       Physical Exam:   Physical Exam  Constitutional:       Appearance: Normal appearance  HENT:      Head: Normocephalic and atraumatic  Nose: Nose normal       Mouth/Throat:      Mouth: Mucous membranes are moist       Pharynx: Oropharynx is clear  Eyes:      Extraocular Movements: Extraocular movements intact  Pupils: Pupils are equal, round, and reactive to light  Cardiovascular:      Rate and Rhythm: Normal rate and regular rhythm  Pulmonary:      Effort: Pulmonary effort is normal       Breath sounds: Normal breath sounds  Abdominal:      General: Bowel sounds are normal  There is no distension  Palpations: Abdomen is soft  Tenderness: There is no abdominal tenderness  Musculoskeletal:         General: No swelling  Cervical back: Normal range of motion and neck supple  Comments: Left shoulder in sling   Skin:     General: Skin is warm and dry  Comments: Some bruising in the left upper chest and left upper arm   Neurological:      General: No focal deficit present  Mental Status: She is alert  Additional Data:     Labs:  Results from last 7 days   Lab Units 03/07/22  0543 03/06/22  0919 03/05/22  0558   WBC Thousand/uL 6 85  --  9 34   HEMOGLOBIN g/dL 8 8*   < > 7 8*   HEMATOCRIT % 29 5*   < > 25 9*   PLATELETS Thousands/uL 297  --  320   NEUTROS PCT %  --   --  68   LYMPHS PCT %  --   --  15   MONOS PCT %  --   --  11   EOS PCT %  --   --  4    < > = values in this interval not displayed       Results from last 7 days   Lab Units 03/07/22  0543 03/02/22  0551 03/01/22  1246   SODIUM mmol/L 143   < > 139   POTASSIUM mmol/L 4 3   < > 5 7*   CHLORIDE mmol/L 106   < > 107   CO2 mmol/L 30   < > 28   BUN mg/dL 48*   < > 24   CREATININE mg/dL 1 52*   < > 1 03   ANION GAP mmol/L 7   < > 4   CALCIUM mg/dL 8 4   < > 8 4   ALBUMIN g/dL  --   --  2 8*   TOTAL BILIRUBIN mg/dL  --   --  0 77   ALK PHOS U/L  --   --  69   ALT U/L  --   --  23   AST U/L  --   --  23   GLUCOSE RANDOM mg/dL 54*   < > 74    < > = values in this interval not displayed           Results from last 7 days   Lab Units 03/07/22  1058 03/07/22  0736 03/07/22  0659 03/06/22  2339 03/06/22  2225 03/06/22  2153 03/06/22  1603 03/06/22  1103 03/06/22  0727 03/06/22  0229 03/05/22  2057 03/05/22  1622   POC GLUCOSE mg/dl 89 68 50* 108 74 42* 65 78 73 115 119 78     Results from last 7 days   Lab Units 03/02/22  0009   HEMOGLOBIN A1C % 8 2*           Lines/Drains:  Invasive Devices  Report    Peripheral Intravenous Line            Peripheral IV 03/04/22 Distal;Right;Upper;Ventral (anterior) Arm 2 days                      Imaging: Reviewed radiology reports from this admission including: Left shoulder x-ray    Recent Cultures (last 7 days):         Last 24 Hours Medication List:   Current Facility-Administered Medications   Medication Dose Route Frequency Provider Last Rate    acetaminophen  650 mg Oral Q6H PRN Belia Conrad PA-C      albuterol  2 puff Inhalation Q6H PRN Belia Conrad PA-C      amLODIPine  5 mg Oral Daily Sandy Bustos MD      bisacodyl  10 mg Rectal Daily PRN Veronica Escamilla MD      dextrose  50 mL Intravenous Once Fernanda MICHELLE Rojas      docusate sodium  100 mg Oral BID Belia Conrad PA-C      heparin (porcine)  5,000 Units Subcutaneous Q8H [facility] Veronica Escamilla MD      HYDROcodone-acetaminophen  1 tablet Oral Q4H PRN Tiffany Feliciano MD      insulin glargine  20 Units Subcutaneous HS Georgi Dye MD      insulin lispro  2-12 Units Subcutaneous TID SHANNON Lam PA-C      loratadine 10 mg Oral Daily Errol Ospina PA-C      metoprolol tartrate  50 mg Oral Q12H [address] Errol Ospina PA-C      montelukast  10 mg Oral HS Errol Ospina PA-C      nystatin   Topical BID Errol Ospina PA-C      ondansetron  4 mg Intravenous Q6H PRN Errol Ospina PA-C      polyethylene glycol  17 g Oral Daily Katya Jenkins MD      pravastatin  80 mg Oral Daily With Gómez Burroughs PA-C      pregabalin  100 mg Oral BID Errol Ospina PA-C      senna  1 tablet Oral HS Katya Jenkins MD          Today, Patient Was Seen By: Katya Jenkins MD    **Please Note: This note may have been constructed using a voice recognition system  **

## 2022-03-07 NOTE — CASE MANAGEMENT
Case Management Discharge Planning Note    Patient name Velia Bob  Location [address] MRN [id number]  : 1950 Date 3/7/2022       Current Admission Date: 3/1/2022  Current Admission Diagnosis:Closed 4-part fracture of proximal humerus, left, initial encounter   Patient Active Problem List    Diagnosis Date Noted    Constipation 2022    Closed 4-part fracture of proximal humerus, left, initial encounter 2022    PONV (postoperative nausea and vomiting) 2022    Acute renal failure superimposed on chronic kidney disease ([facility] [address] ) 2022    Diabetes mellitus, type 2 ([facility] [address] ) 2022    Gastroesophageal reflux disease 2022    Nephrolithiasis 2022    Arthritis 2022    S/P total knee replacement, right 2022    On continuous oral anticoagulation - eliquis 2022    Humeral head fracture 2022    Essential hypertension 2019    Anemia 2019    Mixed hyperlipidemia 2019    Paroxysmal atrial fibrillation ([facility] [address] ) 2019    Lower leg edema 2019    Asthma 2018    Morbid obesity with BMI of 45 0-49 9, adult ([facility] [address] ) 2018      LOS (days): 4  Geometric Mean LOS (GMLOS) (days): 1 40  Days to GMLOS:-2 8     OBJECTIVE:  Risk of Unplanned Readmission Score: 20     Current admission status: Inpatient   Preferred Pharmacy:   [address] [place], [place] [address] , [address] [place] [id number]  Phone: [phone] Fax: [phone]    Primary Care Provider: Miguel Leblanc    Primary Insurance: MEDICARE  Secondary Insurance: [employer]    DISCHARGE DETAILS:    Discharge planning discussed with[de-identified] Patient  Freedom of Choice: Yes  Comments - Freedom of Choice: Discharge ordered  Met with pt to discuss discharge today  Pt is requesting transfer back to [facility] today       Discharge Destination Plan[de-identified] Short Term Rehab  Transport at Discharge : Wheelchair [facility]  Dispatcher Contacted: Yes  Number/Name of Dispatcher: [facility]  Transported by [employer] and Unit #):  (pending)  ETA of Transport (Date): 03/07/22  ETA of Transport (Time): 1500 (requested)      Accepting Facility Name, [facility] : [facility] [place], [place]  Receiving Facility/Agency Phone Number: [phone]

## 2022-03-07 NOTE — ASSESSMENT & PLAN NOTE
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  Creatinine continues to get worse postoperatively and has increased to 2 3  PVRs were unremarkable  Avoid nephrotoxic agents and hypotension  Patient received IV hydration and Nephrology was consulted due to worsening creatinine  Etiology felt to be secondary to ATN and drop in hemoglobin  Patient received 1 unit of PRBC transfusion for anemia  Fluids were discontinued and patient received a dose of albumin on March 4, 2022  Creatinine has improved and has been stable around 2  CK level is normal  Creatinine has improved significantly and is close to baseline    Outpatient follow-up with Nephrology

## 2022-03-07 NOTE — OCCUPATIONAL THERAPY NOTE
Occupational Therapy Treatment Note       03/07/22 1415   Note Type   Note Type Treatment   Restrictions/Precautions   LUE Weight Bearing Per Order NWB   Braces or Orthoses Sling  (L shoulder abduction brace)   Other Precautions Chair Alarm; Bed Alarm; Fall Risk;Pain  (Elbowr/wrist/hand ROM only per Ortho)   Bed Mobility   Additional Comments Pt declined OOB activity today due to being discharged to [facility] soon, agreeable to therex   Therapeutic Exercise - ROM   UE-ROM Yes   ROM - Left Upper Extremities    L Elbow AROM;AAROM;Elbow flexion;Elbow extension   L Wrist AROM; Wrist flexion;Wrist extension   L Hand AROM; Thumb; Index finger; Long finger;Ring finger;Little finger   L Position Supine   L Weight/Reps/Sets 10 reps x 3   Activity Tolerance   Activity Tolerance Patient limited by fatigue;Patient limited by pain   Assessment   Assessment Patient seen for OT treatment this PM  Patient declined OOB activity due to being discharged to [facility] this afternoon, but agreeable to supine therex  Completed LUE ROM for elbow/wrist/hand only as per ortho MD orders  Patient required AAROM for elbow flexion due to pain and increased weakness; increased swelling noted to dorsum of L hand; pt required increased time to complete exercises due to weakness and pain with movement  Educated pain to keep LUE positioned appropriately in sling with hand elevated to reduce swelling, pt verbalized good understanding  The patient's raw score on the -PAC Daily Activity inpatient short form is 14, standardized score is 33 39, less than 39 4  Patients at this level are likely to benefit from discharge to post-acute rehabilitation services  Please refer to the recommendation of the Occupational Therapist for safe discharge planning  At end of session patient supine in bed with all needs met, continue OT per POC     Plan   OT Frequency 5x/wk   Recommendation   OT Discharge Recommendation Post acute rehabilitation services   -[facility] Daily Activity Inpatient Lower Body Dressing 2   Bathing 2   Toileting 2   Upper Body Dressing 2   Grooming 3   Eating 3   Daily Activity Raw Score 14   Daily Activity Standardized Score (Calc for Raw Score >=11) 33 39   AM-PAC Applied Cognition Inpatient   Following a Speech/Presentation 4   Understanding Ordinary Conversation 4   Taking Medications 4   Remembering Where Things Are Placed or Put Away 4   Remembering List of 4-5 Errands 4   Taking Care of Complicated Tasks 3   Applied Cognition Raw Score 23   Applied Cognition Standardized Score 59 76   Licensure   [place] License Number  Vineet Alvarado, OTR/L [id number]

## 2022-03-07 NOTE — PROGRESS NOTES
NEPHROLOGY PROGRESS NOTE    Gideon Petersen 73 y o  female MRN: [id number]  Unit/Bed#: [address] [place] [address] Encounter: [id number]  Reason for Consult:  SEBASTIAN    Patient is awake and alert just looks a little anxious this morning as she just feels that she can not really do much could she is restricted because of her arm  Other than that did have much of an appetite  ASSESSMENT/PLAN:  1  Renal    Patient had acute kidney injury baseline creatinine appears to range 1 1-1 5  Creatinine is improving and is actually down to 1 5 today so at top end of baseline range  Reviewing medications she is not on any diuretics  She did receive some blood transfusion that helps effective arterial blood volume  For now she appears stable will need rehab  Once discharged will follow-up with her primary nephrologist   Hyperkalemia is resolved  No changes can monitor renal function over next 24 hours  Follow-up with primary nephrologist on discharge  No NSAIDs    2  Left humerus fracture    Patient underwent left shoulder reverse arthroplasty  Postop care per Orthopedics  Arm in a sling  SUBJECTIVE:  Review of Systems   Constitutional: Positive for malaise/fatigue  Negative for chills, diaphoresis and fever  Not very hungry  HENT: Negative  Eyes: Negative  Cardiovascular: Negative for chest pain, dyspnea on exertion, orthopnea and palpitations  Respiratory: Negative  Negative for cough, shortness of breath, sputum production and wheezing  Musculoskeletal:        Postoperative shoulder pain  Gastrointestinal: Negative for abdominal pain, diarrhea, nausea and vomiting  Genitourinary: Negative for dysuria, flank pain, hematuria and incomplete emptying  Neurological: Positive for weakness  Negative for dizziness, focal weakness, headaches and light-headedness  Psychiatric/Behavioral: Negative for altered mental status, depression, hallucinations and hypervigilance  The patient is nervous/anxious  Frustrated and anxious  OBJECTIVE:  Current Weight: Weight - Scale: 127 kg (279 lb 15 8 oz)  Vitals:Temp (24hrs), Av 1 °F (36 7 °C), Min:97 4 °F (36 3 °C), Max:98 8 °F (37 1 °C)  Current: Temperature: (!) 97 4 °F (36 3 °C)   Blood pressure 164/69, pulse 76, temperature (!) 97 4 °F (36 3 °C), temperature source Oral, resp  rate 17, height 5' 2 01" (1 575 m), weight 127 kg (279 lb 15 8 oz), SpO2 96 %, not currently breastfeeding  , Body mass index is 51 2 kg/m²  Intake/Output Summary (Last 24 hours) at 3/7/2022 0828  Last data filed at 3/7/2022 0310  Gross per 24 hour   Intake 350 ml   Output 1275 ml   Net -925 ml       Physical Exam: /69 (BP Location: Right arm)   Pulse 76   Temp (!) 97 4 °F (36 3 °C) (Oral)   Resp 17   Ht 5' 2 01" (1 575 m)   Wt 127 kg (279 lb 15 8 oz)   SpO2 96%   BMI 51 20 kg/m²   Physical Exam  Constitutional:       General: She is not in acute distress  Appearance: She is not toxic-appearing or diaphoretic  HENT:      Head: Normocephalic and atraumatic  Mouth/Throat:      Mouth: Mucous membranes are dry  Eyes:      General: No scleral icterus  Extraocular Movements: Extraocular movements intact  Cardiovascular:      Rate and Rhythm: Normal rate and regular rhythm  Heart sounds: No friction rub  No gallop  Pulmonary:      Effort: Pulmonary effort is normal  No respiratory distress  Breath sounds: No rhonchi or rales  Comments: Rare wheeze  Abdominal:      General: Bowel sounds are normal  There is no distension  Palpations: Abdomen is soft  Tenderness: There is no abdominal tenderness  There is no rebound  Musculoskeletal:      Cervical back: Normal range of motion and neck supple  Neurological:      Mental Status: She is alert and oriented to person, place, and time  Psychiatric:         Mood and Affect: Mood normal          Behavior: Behavior normal          Thought Content:  Thought content normal  Judgment: Judgment normal          Medications:    Current Facility-Administered Medications:     acetaminophen (TYLENOL) tablet 650 mg, 650 mg, Oral, Q6H PRN, Kwadwo Tomas PA-C, 650 mg at 03/05/22 0906    albuterol (PROVENTIL HFA,VENTOLIN HFA) inhaler 2 puff, 2 puff, Inhalation, Q6H PRN, Kwadwo Tomas PA-C, 2 puff at 03/07/22 0236    amLODIPine (NORVASC) tablet 5 mg, 5 mg, Oral, Daily, Barrett Bennett MD, 5 mg at 03/06/22 4066    bisacodyl (DULCOLAX) rectal suppository 10 mg, 10 mg, Rectal, Daily PRN, Evelin Trejo MD, 10 mg at 03/05/22 1046    dextrose 50 % IV solution 50 mL, 50 mL, Intravenous, Once, Fernanda Rojas PA-C    docusate sodium (COLACE) capsule 100 mg, 100 mg, Oral, BID, Kwadwo Tomas PA-C, 100 mg at 03/06/22 1754    heparin (porcine) subcutaneous injection 5,000 Units, 5,000 Units, Subcutaneous, Q8H [facility], Evelin Trejo MD, 5,000 Units at 03/07/22 0535    HYDROcodone-acetaminophen (NORCO) 5-325 mg per tablet 1 tablet, 1 tablet, Oral, Q4H PRN, Mame Yoder MD, 1 tablet at 03/07/22 0250    insulin glargine (LANTUS) subcutaneous injection 20 Units 0 2 mL, 20 Units, Subcutaneous, Q12H [facility], Georgi Dye MD    insulin lispro (HumaLOG) 100 units/mL subcutaneous injection 2-12 Units, 2-12 Units, Subcutaneous, TID AC, 2 Units at 03/04/22 1250 **AND** Fingerstick Glucose (POCT), , , TID AC, Diana Soliz PA-C    loratadine (CLARITIN) tablet 10 mg, 10 mg, Oral, Daily, Kwadwo Tomas PA-C, 10 mg at 03/06/22 0719    metoprolol tartrate (LOPRESSOR) tablet 50 mg, 50 mg, Oral, Q12H [facility], Kwadwo Tomas PA-C, 50 mg at 03/06/22 2221    montelukast (SINGULAIR) tablet 10 mg, 10 mg, Oral, HS, Kwadwo Tomas PA-C, 10 mg at 03/06/22 2203    nystatin (MYCOSTATIN) powder, , Topical, BID, Kwadwo Tomas PA-C, Given at 03/06/22 1754    ondansetron [facility]) injection 4 mg, 4 mg, Intravenous, Q6H PRN, Kwadwo Tomas PA-C    polyethylene glycol (MIRALAX) packet 17 g, 17 g, Oral, Daily, Salud Millan MD, 17 g at 03/06/22 0852    pravastatin (PRAVACHOL) tablet 80 mg, 80 mg, Oral, Daily With Johanna Westbrook PA-C, 80 mg at 03/06/22 1626    pregabalin (LYRICA) capsule 100 mg, 100 mg, Oral, BID, Kriss Hodgson PA-C, 100 mg at 03/06/22 1754    senna (SENOKOT) tablet 8 6 mg, 1 tablet, Oral, HS, Salud Millan MD, 8 6 mg at 03/06/22 2203    Laboratory Results:  Lab Results   Component Value Date    WBC 6 85 03/07/2022    HGB 8 8 (L) 03/07/2022    HCT 29 5 (L) 03/07/2022    MCV 96 03/07/2022     03/07/2022     Lab Results   Component Value Date    SODIUM 143 03/07/2022    K 4 3 03/07/2022     03/07/2022    CO2 30 03/07/2022    BUN 48 (H) 03/07/2022    CREATININE 1 52 (H) 03/07/2022    GLUC 54 (L) 03/07/2022    CALCIUM 8 4 03/07/2022     Lab Results   Component Value Date    CALCIUM 8 4 03/07/2022    PHOS 3 8 10/18/2019     No results found for: LABPROT

## 2022-03-07 NOTE — NURSING NOTE
I printed out the AVS form  I gave the AVS, face sheet, and [place] transfer form to the transport team  I called [facility] and gave report to [facility]

## 2022-03-08 ENCOUNTER — HOSPITAL ENCOUNTER (EMERGENCY)
Facility: HOSPITAL | Age: 72
DRG: 291 | End: 2022-03-09
Attending: EMERGENCY MEDICINE
Payer: MEDICARE

## 2022-03-08 ENCOUNTER — APPOINTMENT (EMERGENCY)
Dept: RADIOLOGY | Facility: HOSPITAL | Age: 72
DRG: 291 | End: 2022-03-08
Payer: MEDICARE

## 2022-03-08 DIAGNOSIS — R07.9 CHEST PAIN: Primary | ICD-10-CM

## 2022-03-08 DIAGNOSIS — N39.0 UTI (URINARY TRACT INFECTION): ICD-10-CM

## 2022-03-08 LAB
2HR DELTA HS TROPONIN: 0 NG/L
ALBUMIN SERPL BCP-MCNC: 2.3 G/DL (ref 3.5–5)
ALP SERPL-CCNC: 83 U/L (ref 46–116)
ALT SERPL W P-5'-P-CCNC: 18 U/L (ref 12–78)
ANION GAP SERPL CALCULATED.3IONS-SCNC: 11 MMOL/L (ref 4–13)
APTT PPP: 41 SECONDS (ref 23–37)
AST SERPL W P-5'-P-CCNC: 22 U/L (ref 5–45)
BACTERIA UR QL AUTO: ABNORMAL /HPF
BASOPHILS # BLD AUTO: 0.06 THOUSANDS/ΜL (ref 0–0.1)
BASOPHILS NFR BLD AUTO: 1 % (ref 0–1)
BILIRUB SERPL-MCNC: 0.52 MG/DL (ref 0.2–1)
BILIRUB UR QL STRIP: NEGATIVE
BUN SERPL-MCNC: 37 MG/DL (ref 5–25)
CALCIUM ALBUM COR SERPL-MCNC: 9.6 MG/DL (ref 8.3–10.1)
CALCIUM SERPL-MCNC: 8.2 MG/DL (ref 8.3–10.1)
CARDIAC TROPONIN I PNL SERPL HS: 7 NG/L
CARDIAC TROPONIN I PNL SERPL HS: 7 NG/L
CHLORIDE SERPL-SCNC: 107 MMOL/L (ref 100–108)
CLARITY UR: ABNORMAL
CO2 SERPL-SCNC: 27 MMOL/L (ref 21–32)
COLOR UR: YELLOW
CREAT SERPL-MCNC: 1.24 MG/DL (ref 0.6–1.3)
EOSINOPHIL # BLD AUTO: 0.21 THOUSAND/ΜL (ref 0–0.61)
EOSINOPHIL NFR BLD AUTO: 4 % (ref 0–6)
ERYTHROCYTE [DISTWIDTH] IN BLOOD BY AUTOMATED COUNT: 15.8 % (ref 11.6–15.1)
GFR SERPL CREATININE-BSD FRML MDRD: 44 ML/MIN/1.73SQ M
GLUCOSE SERPL-MCNC: 122 MG/DL (ref 65–140)
GLUCOSE UR STRIP-MCNC: NEGATIVE MG/DL
HCT VFR BLD AUTO: 29.9 % (ref 34.8–46.1)
HGB BLD-MCNC: 8.8 G/DL (ref 11.5–15.4)
HGB UR QL STRIP.AUTO: ABNORMAL
HYALINE CASTS #/AREA URNS LPF: ABNORMAL /LPF
IMM GRANULOCYTES # BLD AUTO: 0.03 THOUSAND/UL (ref 0–0.2)
IMM GRANULOCYTES NFR BLD AUTO: 1 % (ref 0–2)
INR PPP: 1.36 (ref 0.84–1.19)
KETONES UR STRIP-MCNC: ABNORMAL MG/DL
LEUKOCYTE ESTERASE UR QL STRIP: NEGATIVE
LYMPHOCYTES # BLD AUTO: 1.28 THOUSANDS/ΜL (ref 0.6–4.47)
LYMPHOCYTES NFR BLD AUTO: 21 % (ref 14–44)
MAGNESIUM SERPL-MCNC: 2.5 MG/DL (ref 1.6–2.6)
MCH RBC QN AUTO: 28.3 PG (ref 26.8–34.3)
MCHC RBC AUTO-ENTMCNC: 29.4 G/DL (ref 31.4–37.4)
MCV RBC AUTO: 96 FL (ref 82–98)
MONOCYTES # BLD AUTO: 0.93 THOUSAND/ΜL (ref 0.17–1.22)
MONOCYTES NFR BLD AUTO: 15 % (ref 4–12)
MUCOUS THREADS UR QL AUTO: ABNORMAL
NEUTROPHILS # BLD AUTO: 3.51 THOUSANDS/ΜL (ref 1.85–7.62)
NEUTS SEG NFR BLD AUTO: 58 % (ref 43–75)
NITRITE UR QL STRIP: NEGATIVE
NON-SQ EPI CELLS URNS QL MICRO: ABNORMAL /HPF
NRBC BLD AUTO-RTO: 0 /100 WBCS
PH UR STRIP.AUTO: 6 [PH]
PLATELET # BLD AUTO: 331 THOUSANDS/UL (ref 149–390)
PMV BLD AUTO: 9.8 FL (ref 8.9–12.7)
POTASSIUM SERPL-SCNC: 4.6 MMOL/L (ref 3.5–5.3)
PROT SERPL-MCNC: 6.2 G/DL (ref 6.4–8.2)
PROT UR STRIP-MCNC: ABNORMAL MG/DL
PROTHROMBIN TIME: 16.4 SECONDS (ref 11.6–14.5)
RBC # BLD AUTO: 3.11 MILLION/UL (ref 3.81–5.12)
RBC #/AREA URNS AUTO: ABNORMAL /HPF
SODIUM SERPL-SCNC: 145 MMOL/L (ref 136–145)
SP GR UR STRIP.AUTO: 1.02 (ref 1–1.03)
UROBILINOGEN UR QL STRIP.AUTO: 0.2 E.U./DL
WBC # BLD AUTO: 6.02 THOUSAND/UL (ref 4.31–10.16)
WBC #/AREA URNS AUTO: ABNORMAL /HPF

## 2022-03-08 PROCEDURE — 80053 COMPREHEN METABOLIC PANEL: CPT | Performed by: EMERGENCY MEDICINE

## 2022-03-08 PROCEDURE — 93005 ELECTROCARDIOGRAM TRACING: CPT

## 2022-03-08 PROCEDURE — 83735 ASSAY OF MAGNESIUM: CPT | Performed by: EMERGENCY MEDICINE

## 2022-03-08 PROCEDURE — 85025 COMPLETE CBC W/AUTO DIFF WBC: CPT | Performed by: EMERGENCY MEDICINE

## 2022-03-08 PROCEDURE — 71045 X-RAY EXAM CHEST 1 VIEW: CPT

## 2022-03-08 PROCEDURE — 96375 TX/PRO/DX INJ NEW DRUG ADDON: CPT

## 2022-03-08 PROCEDURE — 36415 COLL VENOUS BLD VENIPUNCTURE: CPT | Performed by: EMERGENCY MEDICINE

## 2022-03-08 PROCEDURE — 99285 EMERGENCY DEPT VISIT HI MDM: CPT

## 2022-03-08 PROCEDURE — 99285 EMERGENCY DEPT VISIT HI MDM: CPT | Performed by: EMERGENCY MEDICINE

## 2022-03-08 PROCEDURE — 81001 URINALYSIS AUTO W/SCOPE: CPT | Performed by: EMERGENCY MEDICINE

## 2022-03-08 PROCEDURE — 84484 ASSAY OF TROPONIN QUANT: CPT | Performed by: EMERGENCY MEDICINE

## 2022-03-08 PROCEDURE — 85610 PROTHROMBIN TIME: CPT | Performed by: EMERGENCY MEDICINE

## 2022-03-08 PROCEDURE — 85730 THROMBOPLASTIN TIME PARTIAL: CPT | Performed by: EMERGENCY MEDICINE

## 2022-03-08 PROCEDURE — 96376 TX/PRO/DX INJ SAME DRUG ADON: CPT

## 2022-03-08 PROCEDURE — 96361 HYDRATE IV INFUSION ADD-ON: CPT

## 2022-03-08 PROCEDURE — 96365 THER/PROPH/DIAG IV INF INIT: CPT

## 2022-03-08 RX ORDER — FUROSEMIDE 10 MG/ML
20 INJECTION INTRAMUSCULAR; INTRAVENOUS ONCE
Status: COMPLETED | OUTPATIENT
Start: 2022-03-08 | End: 2022-03-08

## 2022-03-08 RX ORDER — HYDROMORPHONE HCL/PF 1 MG/ML
0.5 SYRINGE (ML) INJECTION ONCE
Status: DISCONTINUED | OUTPATIENT
Start: 2022-03-08 | End: 2022-03-08

## 2022-03-08 RX ORDER — ONDANSETRON 2 MG/ML
4 INJECTION INTRAMUSCULAR; INTRAVENOUS ONCE
Status: COMPLETED | OUTPATIENT
Start: 2022-03-08 | End: 2022-03-08

## 2022-03-08 RX ORDER — CEFTRIAXONE 1 G/50ML
1000 INJECTION, SOLUTION INTRAVENOUS ONCE
Status: COMPLETED | OUTPATIENT
Start: 2022-03-08 | End: 2022-03-08

## 2022-03-08 RX ORDER — HYDROMORPHONE HCL/PF 1 MG/ML
0.2 SYRINGE (ML) INJECTION ONCE
Status: COMPLETED | OUTPATIENT
Start: 2022-03-08 | End: 2022-03-08

## 2022-03-08 RX ORDER — METOPROLOL TARTRATE 5 MG/5ML
5 INJECTION INTRAVENOUS ONCE
Status: COMPLETED | OUTPATIENT
Start: 2022-03-08 | End: 2022-03-08

## 2022-03-08 RX ORDER — CEPHALEXIN 500 MG/1
500 CAPSULE ORAL 2 TIMES DAILY
Qty: 10 CAPSULE | Refills: 0 | Status: SHIPPED | OUTPATIENT
Start: 2022-03-08 | End: 2022-03-22 | Stop reason: HOSPADM

## 2022-03-08 RX ADMIN — SODIUM CHLORIDE 500 ML: 9 INJECTION, SOLUTION INTRAVENOUS at 17:26

## 2022-03-08 RX ADMIN — HYDROMORPHONE HYDROCHLORIDE 0.2 MG: 1 INJECTION, SOLUTION INTRAMUSCULAR; INTRAVENOUS; SUBCUTANEOUS at 19:01

## 2022-03-08 RX ADMIN — HYDROMORPHONE HYDROCHLORIDE 0.2 MG: 1 INJECTION, SOLUTION INTRAMUSCULAR; INTRAVENOUS; SUBCUTANEOUS at 21:35

## 2022-03-08 RX ADMIN — METOPROLOL TARTRATE 5 MG: 5 INJECTION INTRAVENOUS at 18:19

## 2022-03-08 RX ADMIN — FUROSEMIDE 20 MG: 10 INJECTION, SOLUTION INTRAMUSCULAR; INTRAVENOUS at 22:21

## 2022-03-08 RX ADMIN — CEFTRIAXONE 1000 MG: 1 INJECTION, SOLUTION INTRAVENOUS at 20:09

## 2022-03-08 RX ADMIN — ONDANSETRON 4 MG: 2 INJECTION INTRAMUSCULAR; INTRAVENOUS at 18:17

## 2022-03-08 NOTE — ED PROCEDURE NOTE
PROCEDURE  ECG 12 Lead Documentation Only    Date/Time: 3/8/2022 5:10 PM  Performed by: Irina Anna DO  Authorized by: Irina Anna DO     ECG reviewed by me, the ED Provider: yes    Patient location:  ED  Interpretation:     Interpretation: abnormal    Rate:     ECG rate:  106    ECG rate assessment: tachycardic    Rhythm:     Rhythm: atrial fibrillation    Ectopy:     Ectopy: PVCs    ST segments:     ST segments:  Normal  T waves:     T waves: normal           Irina Anna DO  03/08/22 1710

## 2022-03-09 ENCOUNTER — APPOINTMENT (EMERGENCY)
Dept: RADIOLOGY | Facility: HOSPITAL | Age: 72
DRG: 291 | End: 2022-03-09
Payer: MEDICARE

## 2022-03-09 ENCOUNTER — HOSPITAL ENCOUNTER (INPATIENT)
Facility: HOSPITAL | Age: 72
LOS: 13 days | Discharge: HOME HEALTH | DRG: 291 | End: 2022-03-22
Attending: EMERGENCY MEDICINE | Admitting: INTERNAL MEDICINE
Payer: MEDICARE

## 2022-03-09 VITALS
RESPIRATION RATE: 20 BRPM | WEIGHT: 293 LBS | TEMPERATURE: 97.9 F | HEART RATE: 89 BPM | SYSTOLIC BLOOD PRESSURE: 144 MMHG | BODY MASS INDEX: 53.92 KG/M2 | DIASTOLIC BLOOD PRESSURE: 66 MMHG | OXYGEN SATURATION: 90 % | HEIGHT: 62 IN

## 2022-03-09 DIAGNOSIS — N17.9 AKI (ACUTE KIDNEY INJURY) (HCC): ICD-10-CM

## 2022-03-09 DIAGNOSIS — I50.31 ACUTE DIASTOLIC CONGESTIVE HEART FAILURE (HCC): ICD-10-CM

## 2022-03-09 DIAGNOSIS — Z96.612 STATUS POST TOTAL REPLACEMENT OF LEFT SHOULDER: ICD-10-CM

## 2022-03-09 DIAGNOSIS — I50.9 CHF (CONGESTIVE HEART FAILURE) (HCC): Primary | ICD-10-CM

## 2022-03-09 PROBLEM — R11.2 NAUSEA & VOMITING: Status: ACTIVE | Noted: 2022-03-09

## 2022-03-09 PROBLEM — R06.02 SHORTNESS OF BREATH: Status: ACTIVE | Noted: 2022-03-09

## 2022-03-09 LAB
2HR DELTA HS TROPONIN: 0 NG/L
4HR DELTA HS TROPONIN: 0 NG/L
ALBUMIN SERPL BCP-MCNC: 2.4 G/DL (ref 3.5–5)
ALP SERPL-CCNC: 84 U/L (ref 46–116)
ALT SERPL W P-5'-P-CCNC: 17 U/L (ref 12–78)
ANION GAP SERPL CALCULATED.3IONS-SCNC: 7 MMOL/L (ref 4–13)
APTT PPP: 37 SECONDS (ref 23–37)
AST SERPL W P-5'-P-CCNC: 13 U/L (ref 5–45)
ATRIAL RATE: 127 BPM
ATRIAL RATE: 83 BPM
BASOPHILS # BLD AUTO: 0.09 THOUSANDS/ΜL (ref 0–0.1)
BASOPHILS NFR BLD AUTO: 2 % (ref 0–1)
BILIRUB SERPL-MCNC: 0.41 MG/DL (ref 0.2–1)
BUN SERPL-MCNC: 32 MG/DL (ref 5–25)
CALCIUM ALBUM COR SERPL-MCNC: 9.7 MG/DL (ref 8.3–10.1)
CALCIUM SERPL-MCNC: 8.4 MG/DL (ref 8.3–10.1)
CARDIAC TROPONIN I PNL SERPL HS: 7 NG/L
CHLORIDE SERPL-SCNC: 108 MMOL/L (ref 100–108)
CO2 SERPL-SCNC: 31 MMOL/L (ref 21–32)
CREAT SERPL-MCNC: 1.14 MG/DL (ref 0.6–1.3)
EOSINOPHIL # BLD AUTO: 0.24 THOUSAND/ΜL (ref 0–0.61)
EOSINOPHIL NFR BLD AUTO: 4 % (ref 0–6)
ERYTHROCYTE [DISTWIDTH] IN BLOOD BY AUTOMATED COUNT: 15.5 % (ref 11.6–15.1)
FLUAV RNA RESP QL NAA+PROBE: NEGATIVE
FLUBV RNA RESP QL NAA+PROBE: NEGATIVE
GFR SERPL CREATININE-BSD FRML MDRD: 49 ML/MIN/1.73SQ M
GLUCOSE SERPL-MCNC: 135 MG/DL (ref 65–140)
GLUCOSE SERPL-MCNC: 149 MG/DL (ref 65–140)
GLUCOSE SERPL-MCNC: 152 MG/DL (ref 65–140)
HCT VFR BLD AUTO: 31.7 % (ref 34.8–46.1)
HGB BLD-MCNC: 9.2 G/DL (ref 11.5–15.4)
IMM GRANULOCYTES # BLD AUTO: 0.03 THOUSAND/UL (ref 0–0.2)
IMM GRANULOCYTES NFR BLD AUTO: 1 % (ref 0–2)
INR PPP: 1.22 (ref 0.84–1.19)
LYMPHOCYTES # BLD AUTO: 1.46 THOUSANDS/ΜL (ref 0.6–4.47)
LYMPHOCYTES NFR BLD AUTO: 24 % (ref 14–44)
MAGNESIUM SERPL-MCNC: 2.3 MG/DL (ref 1.6–2.6)
MCH RBC QN AUTO: 28 PG (ref 26.8–34.3)
MCHC RBC AUTO-ENTMCNC: 29 G/DL (ref 31.4–37.4)
MCV RBC AUTO: 96 FL (ref 82–98)
MONOCYTES # BLD AUTO: 0.8 THOUSAND/ΜL (ref 0.17–1.22)
MONOCYTES NFR BLD AUTO: 13 % (ref 4–12)
NEUTROPHILS # BLD AUTO: 3.57 THOUSANDS/ΜL (ref 1.85–7.62)
NEUTS SEG NFR BLD AUTO: 56 % (ref 43–75)
NRBC BLD AUTO-RTO: 0 /100 WBCS
NT-PROBNP SERPL-MCNC: 5992 PG/ML
P AXIS: 40 DEGREES
PLATELET # BLD AUTO: 356 THOUSANDS/UL (ref 149–390)
PMV BLD AUTO: 10 FL (ref 8.9–12.7)
POTASSIUM SERPL-SCNC: 4.6 MMOL/L (ref 3.5–5.3)
PR INTERVAL: 168 MS
PROCALCITONIN SERPL-MCNC: 0.08 NG/ML
PROT SERPL-MCNC: 6.2 G/DL (ref 6.4–8.2)
PROTHROMBIN TIME: 15.2 SECONDS (ref 11.6–14.5)
QRS AXIS: -10 DEGREES
QRS AXIS: -2 DEGREES
QRS AXIS: -5 DEGREES
QRSD INTERVAL: 56 MS
QRSD INTERVAL: 72 MS
QRSD INTERVAL: 72 MS
QT INTERVAL: 310 MS
QT INTERVAL: 314 MS
QT INTERVAL: 382 MS
QTC INTERVAL: 411 MS
QTC INTERVAL: 448 MS
QTC INTERVAL: 456 MS
RBC # BLD AUTO: 3.29 MILLION/UL (ref 3.81–5.12)
RETICS # AUTO: ABNORMAL 10*3/UL (ref 14097–95744)
RETICS # CALC: 3.98 % (ref 0.37–1.87)
RSV RNA RESP QL NAA+PROBE: NEGATIVE
SARS-COV-2 RNA RESP QL NAA+PROBE: NEGATIVE
SODIUM SERPL-SCNC: 146 MMOL/L (ref 136–145)
T WAVE AXIS: 12 DEGREES
T WAVE AXIS: 17 DEGREES
T WAVE AXIS: 54 DEGREES
TSH SERPL DL<=0.05 MIU/L-ACNC: 3.55 UIU/ML (ref 0.36–3.74)
VENTRICULAR RATE: 106 BPM
VENTRICULAR RATE: 127 BPM
VENTRICULAR RATE: 83 BPM
WBC # BLD AUTO: 6.19 THOUSAND/UL (ref 4.31–10.16)

## 2022-03-09 PROCEDURE — 0241U HB NFCT DS VIR RESP RNA 4 TRGT: CPT | Performed by: EMERGENCY MEDICINE

## 2022-03-09 PROCEDURE — 71045 X-RAY EXAM CHEST 1 VIEW: CPT

## 2022-03-09 PROCEDURE — 99285 EMERGENCY DEPT VISIT HI MDM: CPT | Performed by: EMERGENCY MEDICINE

## 2022-03-09 PROCEDURE — 96374 THER/PROPH/DIAG INJ IV PUSH: CPT

## 2022-03-09 PROCEDURE — 84484 ASSAY OF TROPONIN QUANT: CPT | Performed by: INTERNAL MEDICINE

## 2022-03-09 PROCEDURE — 36415 COLL VENOUS BLD VENIPUNCTURE: CPT | Performed by: EMERGENCY MEDICINE

## 2022-03-09 PROCEDURE — 82948 REAGENT STRIP/BLOOD GLUCOSE: CPT

## 2022-03-09 PROCEDURE — 99222 1ST HOSP IP/OBS MODERATE 55: CPT | Performed by: INTERNAL MEDICINE

## 2022-03-09 PROCEDURE — 85025 COMPLETE CBC W/AUTO DIFF WBC: CPT | Performed by: EMERGENCY MEDICINE

## 2022-03-09 PROCEDURE — 85730 THROMBOPLASTIN TIME PARTIAL: CPT | Performed by: EMERGENCY MEDICINE

## 2022-03-09 PROCEDURE — 80053 COMPREHEN METABOLIC PANEL: CPT | Performed by: EMERGENCY MEDICINE

## 2022-03-09 PROCEDURE — 82746 ASSAY OF FOLIC ACID SERUM: CPT | Performed by: INTERNAL MEDICINE

## 2022-03-09 PROCEDURE — 99285 EMERGENCY DEPT VISIT HI MDM: CPT

## 2022-03-09 PROCEDURE — 93005 ELECTROCARDIOGRAM TRACING: CPT

## 2022-03-09 PROCEDURE — 85045 AUTOMATED RETICULOCYTE COUNT: CPT | Performed by: INTERNAL MEDICINE

## 2022-03-09 PROCEDURE — 83036 HEMOGLOBIN GLYCOSYLATED A1C: CPT | Performed by: INTERNAL MEDICINE

## 2022-03-09 PROCEDURE — 84145 PROCALCITONIN (PCT): CPT | Performed by: EMERGENCY MEDICINE

## 2022-03-09 PROCEDURE — 82728 ASSAY OF FERRITIN: CPT | Performed by: INTERNAL MEDICINE

## 2022-03-09 PROCEDURE — 83540 ASSAY OF IRON: CPT | Performed by: INTERNAL MEDICINE

## 2022-03-09 PROCEDURE — 84484 ASSAY OF TROPONIN QUANT: CPT | Performed by: EMERGENCY MEDICINE

## 2022-03-09 PROCEDURE — 84443 ASSAY THYROID STIM HORMONE: CPT | Performed by: INTERNAL MEDICINE

## 2022-03-09 PROCEDURE — 71275 CT ANGIOGRAPHY CHEST: CPT

## 2022-03-09 PROCEDURE — G1004 CDSM NDSC: HCPCS

## 2022-03-09 PROCEDURE — 85610 PROTHROMBIN TIME: CPT | Performed by: EMERGENCY MEDICINE

## 2022-03-09 PROCEDURE — 82607 VITAMIN B-12: CPT | Performed by: INTERNAL MEDICINE

## 2022-03-09 PROCEDURE — 83550 IRON BINDING TEST: CPT | Performed by: INTERNAL MEDICINE

## 2022-03-09 PROCEDURE — 87040 BLOOD CULTURE FOR BACTERIA: CPT | Performed by: EMERGENCY MEDICINE

## 2022-03-09 PROCEDURE — 83735 ASSAY OF MAGNESIUM: CPT | Performed by: EMERGENCY MEDICINE

## 2022-03-09 PROCEDURE — 93010 ELECTROCARDIOGRAM REPORT: CPT | Performed by: INTERNAL MEDICINE

## 2022-03-09 PROCEDURE — 83880 ASSAY OF NATRIURETIC PEPTIDE: CPT | Performed by: EMERGENCY MEDICINE

## 2022-03-09 RX ORDER — PRAVASTATIN SODIUM 80 MG/1
80 TABLET ORAL
Status: DISCONTINUED | OUTPATIENT
Start: 2022-03-10 | End: 2022-03-22 | Stop reason: HOSPADM

## 2022-03-09 RX ORDER — NYSTATIN 100000 [USP'U]/G
POWDER TOPICAL 2 TIMES DAILY
Status: DISCONTINUED | OUTPATIENT
Start: 2022-03-09 | End: 2022-03-22 | Stop reason: HOSPADM

## 2022-03-09 RX ORDER — ALBUTEROL SULFATE 90 UG/1
2 AEROSOL, METERED RESPIRATORY (INHALATION) EVERY 6 HOURS PRN
Status: DISCONTINUED | OUTPATIENT
Start: 2022-03-09 | End: 2022-03-17

## 2022-03-09 RX ORDER — METOPROLOL TARTRATE 50 MG/1
50 TABLET, FILM COATED ORAL ONCE
Status: COMPLETED | OUTPATIENT
Start: 2022-03-09 | End: 2022-03-09

## 2022-03-09 RX ORDER — TRAMADOL HYDROCHLORIDE 50 MG/1
50 TABLET ORAL ONCE
Status: COMPLETED | OUTPATIENT
Start: 2022-03-09 | End: 2022-03-09

## 2022-03-09 RX ORDER — SODIUM FERRIC GLUCONATE COMPLEX IN SUCROSE 12.5 MG/ML
125 INJECTION INTRAVENOUS DAILY
Status: COMPLETED | OUTPATIENT
Start: 2022-03-10 | End: 2022-03-14

## 2022-03-09 RX ORDER — AMLODIPINE BESYLATE 5 MG/1
5 TABLET ORAL DAILY
Status: DISCONTINUED | OUTPATIENT
Start: 2022-03-10 | End: 2022-03-22 | Stop reason: HOSPADM

## 2022-03-09 RX ORDER — ONDANSETRON 2 MG/ML
4 INJECTION INTRAMUSCULAR; INTRAVENOUS EVERY 6 HOURS PRN
Status: DISCONTINUED | OUTPATIENT
Start: 2022-03-09 | End: 2022-03-22 | Stop reason: HOSPADM

## 2022-03-09 RX ORDER — AMMONIUM LACTATE 12 G/100G
LOTION TOPICAL 2 TIMES DAILY
Status: DISCONTINUED | OUTPATIENT
Start: 2022-03-09 | End: 2022-03-22 | Stop reason: HOSPADM

## 2022-03-09 RX ORDER — FUROSEMIDE 10 MG/ML
40 INJECTION INTRAMUSCULAR; INTRAVENOUS ONCE
Status: COMPLETED | OUTPATIENT
Start: 2022-03-09 | End: 2022-03-09

## 2022-03-09 RX ORDER — METOPROLOL TARTRATE 5 MG/5ML
5 INJECTION INTRAVENOUS EVERY 6 HOURS PRN
Status: DISCONTINUED | OUTPATIENT
Start: 2022-03-09 | End: 2022-03-22 | Stop reason: HOSPADM

## 2022-03-09 RX ORDER — ONDANSETRON 2 MG/ML
4 INJECTION INTRAMUSCULAR; INTRAVENOUS ONCE
Status: COMPLETED | OUTPATIENT
Start: 2022-03-09 | End: 2022-03-09

## 2022-03-09 RX ORDER — AMLODIPINE BESYLATE 5 MG/1
5 TABLET ORAL ONCE
Status: COMPLETED | OUTPATIENT
Start: 2022-03-09 | End: 2022-03-09

## 2022-03-09 RX ORDER — LIDOCAINE 50 MG/G
2 PATCH TOPICAL DAILY
Status: DISCONTINUED | OUTPATIENT
Start: 2022-03-10 | End: 2022-03-22 | Stop reason: HOSPADM

## 2022-03-09 RX ORDER — INSULIN GLARGINE 100 [IU]/ML
20 INJECTION, SOLUTION SUBCUTANEOUS
Status: DISCONTINUED | OUTPATIENT
Start: 2022-03-09 | End: 2022-03-17

## 2022-03-09 RX ORDER — FUROSEMIDE 10 MG/ML
40 INJECTION INTRAMUSCULAR; INTRAVENOUS
Status: COMPLETED | OUTPATIENT
Start: 2022-03-10 | End: 2022-03-11

## 2022-03-09 RX ORDER — PREGABALIN 100 MG/1
100 CAPSULE ORAL 2 TIMES DAILY
Status: DISCONTINUED | OUTPATIENT
Start: 2022-03-09 | End: 2022-03-22 | Stop reason: HOSPADM

## 2022-03-09 RX ORDER — LORATADINE 10 MG/1
10 TABLET ORAL DAILY
Status: DISCONTINUED | OUTPATIENT
Start: 2022-03-10 | End: 2022-03-22 | Stop reason: HOSPADM

## 2022-03-09 RX ORDER — METOPROLOL TARTRATE 5 MG/5ML
5 INJECTION INTRAVENOUS ONCE
Status: COMPLETED | OUTPATIENT
Start: 2022-03-09 | End: 2022-03-09

## 2022-03-09 RX ORDER — MONTELUKAST SODIUM 10 MG/1
10 TABLET ORAL
Status: DISCONTINUED | OUTPATIENT
Start: 2022-03-09 | End: 2022-03-22 | Stop reason: HOSPADM

## 2022-03-09 RX ORDER — METOCLOPRAMIDE HYDROCHLORIDE 5 MG/ML
10 INJECTION INTRAMUSCULAR; INTRAVENOUS ONCE
Status: COMPLETED | OUTPATIENT
Start: 2022-03-09 | End: 2022-03-09

## 2022-03-09 RX ORDER — METOPROLOL TARTRATE 50 MG/1
50 TABLET, FILM COATED ORAL EVERY 12 HOURS SCHEDULED
Status: DISCONTINUED | OUTPATIENT
Start: 2022-03-09 | End: 2022-03-22 | Stop reason: HOSPADM

## 2022-03-09 RX ORDER — MAGNESIUM HYDROXIDE/ALUMINUM HYDROXICE/SIMETHICONE 120; 1200; 1200 MG/30ML; MG/30ML; MG/30ML
30 SUSPENSION ORAL EVERY 6 HOURS PRN
Status: DISCONTINUED | OUTPATIENT
Start: 2022-03-09 | End: 2022-03-22 | Stop reason: HOSPADM

## 2022-03-09 RX ADMIN — APIXABAN 5 MG: 5 TABLET, FILM COATED ORAL at 22:32

## 2022-03-09 RX ADMIN — TRAMADOL HYDROCHLORIDE 50 MG: 50 TABLET, COATED ORAL at 10:26

## 2022-03-09 RX ADMIN — TRAMADOL HYDROCHLORIDE 50 MG: 50 TABLET, COATED ORAL at 19:58

## 2022-03-09 RX ADMIN — Medication: at 22:42

## 2022-03-09 RX ADMIN — METOPROLOL TARTRATE 50 MG: 50 TABLET, FILM COATED ORAL at 07:52

## 2022-03-09 RX ADMIN — NYSTATIN: 100000 POWDER TOPICAL at 22:42

## 2022-03-09 RX ADMIN — METOPROLOL TARTRATE 5 MG: 5 INJECTION INTRAVENOUS at 21:24

## 2022-03-09 RX ADMIN — FUROSEMIDE 40 MG: 10 INJECTION, SOLUTION INTRAMUSCULAR; INTRAVENOUS at 21:48

## 2022-03-09 RX ADMIN — METOCLOPRAMIDE 10 MG: 5 INJECTION, SOLUTION INTRAMUSCULAR; INTRAVENOUS at 21:06

## 2022-03-09 RX ADMIN — PREGABALIN 100 MG: 100 CAPSULE ORAL at 22:32

## 2022-03-09 RX ADMIN — TRAMADOL HYDROCHLORIDE 50 MG: 50 TABLET ORAL at 00:53

## 2022-03-09 RX ADMIN — IOHEXOL 85 ML: 350 INJECTION, SOLUTION INTRAVENOUS at 19:58

## 2022-03-09 RX ADMIN — ONDANSETRON 4 MG: 2 INJECTION INTRAMUSCULAR; INTRAVENOUS at 19:12

## 2022-03-09 RX ADMIN — INSULIN LISPRO 1 UNITS: 100 INJECTION, SOLUTION INTRAVENOUS; SUBCUTANEOUS at 22:43

## 2022-03-09 RX ADMIN — AMLODIPINE BESYLATE 5 MG: 5 TABLET ORAL at 07:52

## 2022-03-09 RX ADMIN — INSULIN GLARGINE 20 UNITS: 100 INJECTION, SOLUTION SUBCUTANEOUS at 22:42

## 2022-03-09 RX ADMIN — MONTELUKAST 10 MG: 10 TABLET, FILM COATED ORAL at 22:32

## 2022-03-09 NOTE — ED NOTES
Pt ambulated to the BR w/ no complication  Pt eating breakfast       Elham BRAXTON   [address] , [address] [place] [address]  03/09/22 4391

## 2022-03-09 NOTE — ED NOTES
[facility] called at this time in regards to update for transportation back to [place]  As per [employer] no current transportation is available  Patient noted to be sleeping at this time, breathing is steady and unlabored w/o s/s of acute distress  Will continue to monitor        Ld Farah RN  03/09/22 6836

## 2022-03-09 NOTE — ED NOTES
Patient asleep breathing steadily and unlabored w/o s/s of acute distress  Patient pending transportation, still no set time for   Will continue to monitor and update patient       Efraín Mantilla RN  03/09/22 6389

## 2022-03-09 NOTE — ED NOTES
Pt asleep no distress noted  Breakfast ordered  Elham Harveywesley Noe, [facility]  03/09/22 6331

## 2022-03-09 NOTE — ED PROCEDURE NOTE
PROCEDURE  ECG 12 Lead Documentation Only    Date/Time: 3/8/2022 7:04 PM  Performed by: Magdy Lemus DO  Authorized by: Magdy Lemus DO     ECG reviewed by me, the ED Provider: yes    Patient location:  ED  Interpretation:     Interpretation: abnormal    Rate:     ECG rate:  83    ECG rate assessment: normal    Rhythm:     Rhythm: sinus rhythm    Ectopy:     Ectopy: none    ST segments:     ST segments:  Normal  T waves:     T waves: normal           Magdy Lemus DO  03/08/22 1909

## 2022-03-09 NOTE — ED NOTES
This RN calls [facility] at this time  Receiving RN Akin Levin notified that patient is being discharged  Furthermore reveiving RN verbalizes understanding of all provided information and timeframe of patient transport        Hunter Benedict RN  03/08/22 0526

## 2022-03-09 NOTE — ED NOTES
Patient in bed at this time, updated and aware of plan of care  Patient verbalizes understanding  Has no further needs at this time       Get Morgan RN  03/08/22 7278

## 2022-03-09 NOTE — ED NOTES
Patient provided clean gown and splint repositioned for comfort at this time  Patient tolerates well, aware of plan of care pending transfer home        Get Morgan RN  03/09/22 0707

## 2022-03-09 NOTE — ED NOTES
Patient asleep in bed at this time, breathing steadily and unlabored at this time  Will continue to monitor  Pending transport back to facility,        Carla Diaz RN  03/08/22 0903

## 2022-03-09 NOTE — ED NOTES
Patient assisted to commode at this time, tolerates well and voids  States some burning on urination, no blood noted  Patient linens cleaned and changed, placed back into stretcher in position of comfort and provided warm blankets        Clemente Bell RN  03/09/22 6744

## 2022-03-09 NOTE — ED NOTES
Received pt resting comfortably waiting for transport  Pt stable  Elham Coles [facility], [facility]  03/09/22 7654

## 2022-03-09 NOTE — ED PROVIDER NOTES
History  Chief Complaint   Patient presents with    Chest Pain     Arrives via EMS from [facility] after left shoulder surgery c/o CP w/ palpitations and SOB  Patient presents via EMS from short-term rehab for evaluation of chest pain and palpitations  Patient recently had shoulder replacement surgery on her left shoulder after a fall and fracture  Patient also reports that she feels confused  However patient is answering questions appropriately is AAOx3  History provided by:  Patient and EMS personnel   used: No    Chest Pain  Associated symptoms: palpitations    Associated symptoms: no abdominal pain, no back pain, no cough, no fever, no shortness of breath and not vomiting        Prior to Admission Medications   Prescriptions Last Dose Informant Patient Reported? Taking?    Alexsander Brenda 30G X 8 MM [facility] (Specify) Yes No   Trulicity 1 5 NQ/8 3SB SOPN  Outside Facility (Specify) Yes No   Sig: inject 0 5 milliliter subcutaneously every week 28   acetaminophen (TYLENOL) 325 mg tablet  Outside Facility (Specify) Yes No   Sig: Take 650 mg by mouth every 6 (six) hours as needed for mild pain   Patient not taking: Reported on 2/24/2022    albuterol (PROVENTIL HFA,VENTOLIN HFA) 90 mcg/act inhaler  Outside Facility (Specify) Yes No   Sig: Inhale 2 puffs every 6 (six) hours as needed for wheezing   amLODIPine (NORVASC) 5 mg tablet  Outside Facility (Specify) No No   Sig: Take 1 tablet (5 mg total) by mouth daily   ammonium lactate (LAC-HYDRIN) 12 % lotion  Outside Facility (Specify) Yes No   Sig: APPLY TOPICALLY TO AFFECTED AREAS twice a day   apixaban (ELIQUIS) 5 mg  Outside Facility (Specify) No No   Sig: Take 1 tablet (5 mg total) by mouth 2 (two) times a day   Patient taking differently: Take 5 mg by mouth 2 (two) times a day Staff to check with surgeon when to hold    desloratadine (CLARINEX) 5 MG tablet  Outside Facility (Specify) Yes No   Sig: Take by mouth daily     insulin glargine (LANTUS) 100 units/mL subcutaneous injection   No No   Sig: Inject 20 Units under the skin daily at bedtime   lidocaine (LIDODERM) 5 %  Outside Facility (Specify) No No   Sig: Apply 2 patches topically daily Remove & Discard patch within 12 hours or as directed by MD   metoprolol tartrate (LOPRESSOR) 25 mg tablet  Outside Facility (Specify) No No   Sig: Take 2 tablets (50 mg total) by mouth every 12 (twelve) hours   montelukast (SINGULAIR) 10 mg tablet  Outside Facility (Specify) Yes No   Sig: Take 10 mg by mouth daily     nystatin (MYCOSTATIN) powder  Outside Facility (Specify) Yes No   Sig: Apply topically 2 (two) times a day   ondansetron (Zofran ODT) 4 mg disintegrating tablet  Outside Facility (Specify) No No   Sig: Take 1 tablet (4 mg total) by mouth every 6 (six) hours as needed for nausea or vomiting   Patient not taking: Reported on 2/24/2022    polyethylene glycol (MIRALAX) 17 g packet   No No   Sig: Take 17 g by mouth daily   pregabalin (LYRICA) 100 mg capsule  Outside Facility (Specify) Yes No   Sig: Take 100 mg by mouth 2 (two) times a day    rosuvastatin (CRESTOR) 10 MG tablet  Outside Facility (Specify) Yes No   Sig: 10 mg daily        Facility-Administered Medications: None       Past Medical History:   Diagnosis Date    A-fib ([facility] [address] )     Anemia     Asthma     Chronic kidney disease     CKD (chronic kidney disease)     Diabetes mellitus (HCC)     GERD (gastroesophageal reflux disease)     Hyperlipidemia     Hypertension     Kidney stones     PONV (postoperative nausea and vomiting)     SV[facility]ra[facility] tachycardia) ([facility])        Past Surgical History:   Procedure Laterality Date    APPENDECTOMY      CYSTOSCOPY W/ LASER LITHOTRIPSY Left 7/12/2016    Procedure: CYSTOSCOPY URETEROSCOPY WITH LITHOTRIPSY HOLMIUM LASER, RETROGRADE PYELOGRAM AND INSERTION STENT URETERAL;  Surgeon: Wendie Dejesus MD;  Location: [facility];  Service:    [address] [place] [address] OF UTERUS      JOINT REPLACEMENT      right knee    KNEE ARTHROPLASTY Right     MA CYSTOURETHROSCOPY,URETER CATHETER Left 2016    Procedure: CYSTOSCOPY RETROGRADE PYELOGRAM WITH INSERTION STENT URETERAL, left;  Surgeon: Cristian Capellan MD;  Location: [address] [place] [address];  Service: Urology    MA RECONSTR TOTAL SHOULDER IMPLANT Left 3/1/2022    Procedure: ARTHROPLASTY SHOULDER REVERSE;  Surgeon: Aby Rhoades MD;  Location: [facility];  Service: Orthopedics    SHOULDER ARTHROTOMY Left        Family History   Problem Relation Age of Onset    Heart disease Mother     Emphysema Maternal Grandmother     Heart disease Family      I have reviewed and agree with the history as documented  E-Cigarette/Vaping    E-Cigarette Use Never User      E-Cigarette/Vaping Substances     Social History     Tobacco Use    Smoking status: Former Smoker     Packs/day:      Years:      Pack years:      Types: Cigarettes     Quit date: 1996     Years since quittin 6    Smokeless tobacco: Never Used   Vaping Use    Vaping Use: Never used   Substance Use Topics    Alcohol use: Not Currently     Comment: rarely    Drug use: No       Review of Systems   Constitutional: Negative for chills and fever  HENT: Negative for ear pain and sore throat  Eyes: Negative for pain and visual disturbance  Respiratory: Negative for cough and shortness of breath  Cardiovascular: Positive for chest pain and palpitations  Gastrointestinal: Negative for abdominal pain and vomiting  Genitourinary: Negative for dysuria and hematuria  Musculoskeletal: Negative for arthralgias and back pain  Skin: Negative for color change and rash  Neurological: Negative for seizures and syncope  All other systems reviewed and are negative  Physical Exam  Physical Exam  Vitals and nursing note reviewed  Constitutional:       General: She is not in acute distress  HENT:      Head: Atraumatic        Right Ear: External ear normal       Left Ear: External ear normal       Nose: Nose normal       Mouth/Throat:      Mouth: Mucous membranes are moist       Pharynx: Oropharynx is clear  Eyes:      General: No scleral icterus  Conjunctiva/sclera: Conjunctivae normal    Cardiovascular:      Rate and Rhythm: Tachycardia present  Rhythm irregular  Pulses: Normal pulses  Pulmonary:      Effort: Pulmonary effort is normal  No respiratory distress  Breath sounds: Normal breath sounds  Abdominal:      General: Abdomen is flat  Bowel sounds are normal  There is no distension  Palpations: Abdomen is soft  Tenderness: There is no abdominal tenderness  There is no guarding or rebound  Musculoskeletal:         General: No deformity  Normal range of motion  Skin:     Capillary Refill: Capillary refill takes less than 2 seconds  Findings: No rash  Neurological:      General: No focal deficit present  Mental Status: She is alert and oriented to person, place, and time           Vital Signs  ED Triage Vitals   Temperature Pulse Respirations Blood Pressure SpO2   03/08/22 1702 03/08/22 1702 03/08/22 1702 03/08/22 1702 03/08/22 1702   98 8 °F (37 1 °C) 82 20 149/67 95 %      Temp Source Heart Rate Source Patient Position - Orthostatic VS BP Location FiO2 (%)   03/08/22 1702 03/08/22 1702 03/08/22 1702 03/08/22 1702 --   Oral Monitor Lying Left arm       Pain Score       03/08/22 1901       3           Vitals:    03/08/22 2045 03/08/22 2115 03/08/22 2200 03/09/22 0030   BP:   130/58 144/65   Pulse: 81 83 80    Patient Position - Orthostatic VS:   Lying Lying         Visual Acuity      ED Medications  Medications   sodium chloride 0 9 % bolus 500 mL (0 mL Intravenous Stopped 3/8/22 1905)   metoprolol (LOPRESSOR) injection 5 mg (5 mg Intravenous Given 3/8/22 1819)   ondansetron (ZOFRAN) injection 4 mg (4 mg Intravenous Given 3/8/22 1817)   HYDROmorphone (DILAUDID) injection 0 2 mg (0 2 mg Intravenous Given 3/8/22 1901)   cefTRIAXone (ROCEPHIN) IVPB (premix in dextrose) 1,000 mg 50 mL (0 mg Intravenous Stopped 3/8/22 2135)   HYDROmorphone (DILAUDID) injection 0 2 mg (0 2 mg Intravenous Given 3/8/22 2135)   furosemide (LASIX) injection 20 mg (20 mg Intravenous Given 3/8/22 2221)   traMADol (ULTRAM) tablet 50 mg (50 mg Oral Given 3/9/22 0053)       Diagnostic Studies  Results Reviewed     Procedure Component Value Units Date/Time    HS Troponin I 2hr [[id number]]  (Normal) Collected: 03/08/22 2012    Lab Status: Final result Specimen: Blood from Hand, Right Updated: 03/08/22 2040     hs TnI 2hr 7 ng/L      Delta 2hr hsTnI 0 ng/L     HS Troponin I 4hr [[id number]]     Lab Status: No result Specimen: Blood     Urine Microscopic [[id number]]  (Abnormal) Collected: 03/08/22 1834    Lab Status: Final result Specimen: Urine, Straight Cath Updated: 03/08/22 1850     RBC, UA 0-1 /hpf      WBC, UA 2-4 /hpf      Epithelial Cells Moderate /hpf      Bacteria, UA Moderate /hpf      Hyaline Casts, UA 1-2 /lpf      MUCUS THREADS Occasional    UA w Reflex to Microscopic w Reflex to Culture [[id number]]  (Abnormal) Collected: 03/08/22 1834    Lab Status: Final result Specimen: Urine, Straight Cath Updated: 03/08/22 1840     Color, UA Yellow     Clarity, UA Slightly Cloudy     Specific Dana, UA 1 020     pH, UA 6 0     Leukocytes, UA Negative     Nitrite, UA Negative     Protein, UA 30 (1+) mg/dl      Glucose, UA Negative mg/dl      Ketones, UA Trace mg/dl      Urobilinogen, UA 0 2 E U /dl      Bilirubin, UA Negative     Blood, UA Trace-lysed    HS Troponin 0hr (reflex protocol) [[id number]]  (Normal) Collected: 03/08/22 1736    Lab Status: Final result Specimen: Blood Updated: 03/08/22 1804     hs TnI 0hr 7 ng/L     Comprehensive metabolic panel [[id number]]  (Abnormal) Collected: 03/08/22 1736    Lab Status: Final result Specimen: Blood Updated: 03/08/22 1756     Sodium 145 mmol/L      Potassium 4 6 mmol/L      Chloride 107 mmol/L      CO2 27 mmol/L      ANION GAP 11 mmol/L      BUN 37 mg/dL      Creatinine 1 24 mg/dL      Glucose 122 mg/dL      Calcium 8 2 mg/dL      Corrected Calcium 9 6 mg/dL      AST 22 U/L      ALT 18 U/L      Alkaline Phosphatase 83 U/L      Total Protein 6 2 g/dL      Albumin 2 3 g/dL      Total Bilirubin 0 52 mg/dL      eGFR 44 ml/min/1 73sq m     Narrative:      National Kidney Disease Foundation guidelines for Chronic Kidney Disease (CKD):     Stage 1 with normal or high GFR (GFR > 90 mL/min/1 73 square meters)    Stage 2 Mild CKD (GFR = 60-89 mL/min/1 73 square meters)    Stage 3A Moderate CKD (GFR = 45-59 mL/min/1 73 square meters)    Stage 3B Moderate CKD (GFR = 30-44 mL/min/1 73 square meters)    Stage 4 Severe CKD (GFR = 15-29 mL/min/1 73 square meters)    Stage 5 End Stage CKD (GFR <15 mL/min/1 73 square meters)  Note: GFR calculation is accurate only with a steady state creatinine    Magnesium [[id number]]  (Normal) Collected: 03/08/22 1736    Lab Status: Final result Specimen: Blood Updated: 03/08/22 1756     Magnesium 2 5 mg/dL     APTT [[id number]]  (Abnormal) Collected: 03/08/22 1736    Lab Status: Final result Specimen: Blood Updated: 03/08/22 1753     PTT 41 seconds     Protime-INR [[id number]]  (Abnormal) Collected: 03/08/22 1736    Lab Status: Final result Specimen: Blood Updated: 03/08/22 1753     Protime 16 4 seconds      INR 1 36    CBC and differential [[id number]]  (Abnormal) Collected: 03/08/22 1736    Lab Status: Final result Specimen: Blood Updated: 03/08/22 1739     WBC 6 02 Thousand/uL      RBC 3 11 Million/uL      Hemoglobin 8 8 g/dL      Hematocrit 29 9 %      MCV 96 fL      MCH 28 3 pg      MCHC 29 4 g/dL      RDW 15 8 %      MPV 9 8 fL      Platelets 604 Thousands/uL      nRBC 0 /100 WBCs      Neutrophils Relative 58 %      Immat GRANS % 1 %      Lymphocytes Relative 21 %      Monocytes Relative 15 %      Eosinophils Relative 4 %      Basophils Relative 1 %      Neutrophils Absolute 3 51 Thousands/µL      Immature Grans Absolute 0 03 Thousand/uL      Lymphocytes Absolute 1 28 Thousands/µL      Monocytes Absolute 0 93 Thousand/µL      Eosinophils Absolute 0 21 Thousand/µL      Basophils Absolute 0 06 Thousands/µL                  XR chest 1 view portable    (Results Pending)              Procedures  Procedures         ED Course                                             MDM  Number of Diagnoses or Management Options  Chest pain  UTI (urinary tract infection)  Diagnosis management comments: Pulse ox 96% on room air indicating adequate oxygenation  Patient was in a rapid AFib on arrival   She was given metoprolol 5 mg IV  This resulted in good rate control patient ultimately converted back to sinus rhythm  Chest pain resolved while in the ER and troponin x2 is negative  Will treat patient with antibiotics for UTI and discharge back to short-term rehab  Amount and/or Complexity of Data Reviewed  Clinical lab tests: ordered and reviewed  Tests in the radiology section of CPT®: ordered and reviewed  Decide to obtain previous medical records or to obtain history from someone other than the patient: yes  Review and summarize past medical records: yes    Patient Progress  Patient progress: stable      Disposition  Final diagnoses:   Chest pain   UTI (urinary tract infection)     Time reflects when diagnosis was documented in both MDM as applicable and the Disposition within this note     Time User Action Codes Description Comment    3/8/2022  8:52 PM Myles Gan [R07 [id number]] Chest pain     3/8/2022  8:52 PM Venus Briggs [N39 [id number]] UTI (urinary tract infection)       ED Disposition     ED Disposition Condition Date/Time Comment    Discharge Stable Tue Mar 8, 2022  8:52 PM James Fox discharge to home/self care              Follow-up Information     Follow up With Specialties Details Why Contact Info Additional [address]  In 1 week  [address] [facility] [address]  [address] [facility] Emergency Department Emergency Medicine  If symptoms worsen [address] [address]  [address] [address] Emergency Department, [place], [place], [address]          Patient's Medications   Discharge Prescriptions    CEPHALEXIN (KEFLEX) 500 MG CAPSULE    Take 1 capsule (500 mg total) by mouth 2 (two) times a day for 5 days       Start Date: 3/8/2022  End Date: 3/13/2022       Order Dose: 500 mg       Quantity: 10 capsule    Refills: 0       No discharge procedures on file      PDMP Review       Value Time User    PDMP Reviewed  Yes 2/20/2022  1:42  [facility],           ED Provider  Electronically Signed by           Brent Ríos DO  03/09/22 0744

## 2022-03-10 ENCOUNTER — APPOINTMENT (INPATIENT)
Dept: RADIOLOGY | Facility: HOSPITAL | Age: 72
DRG: 291 | End: 2022-03-10
Payer: MEDICARE

## 2022-03-10 ENCOUNTER — PATIENT OUTREACH (OUTPATIENT)
Dept: CASE MANAGEMENT | Facility: OTHER | Age: 72
End: 2022-03-10

## 2022-03-10 ENCOUNTER — APPOINTMENT (INPATIENT)
Dept: NON INVASIVE DIAGNOSTICS | Facility: HOSPITAL | Age: 72
DRG: 291 | End: 2022-03-10
Payer: MEDICARE

## 2022-03-10 PROBLEM — R11.2 NAUSEA & VOMITING: Status: RESOLVED | Noted: 2022-03-09 | Resolved: 2022-03-10

## 2022-03-10 PROBLEM — R07.9 CHEST PAIN: Status: ACTIVE | Noted: 2022-03-10

## 2022-03-10 PROBLEM — I50.31 ACUTE DIASTOLIC CONGESTIVE HEART FAILURE (HCC): Status: ACTIVE | Noted: 2022-03-10

## 2022-03-10 PROBLEM — R07.9 CHEST PAIN: Status: RESOLVED | Noted: 2022-03-10 | Resolved: 2022-03-10

## 2022-03-10 LAB
ALBUMIN SERPL BCP-MCNC: 2.5 G/DL (ref 3.5–5)
ALP SERPL-CCNC: 79 U/L (ref 46–116)
ALT SERPL W P-5'-P-CCNC: 18 U/L (ref 12–78)
ANION GAP SERPL CALCULATED.3IONS-SCNC: 7 MMOL/L (ref 4–13)
APTT PPP: 40 SECONDS (ref 23–37)
AST SERPL W P-5'-P-CCNC: 14 U/L (ref 5–45)
ATRIAL RATE: 102 BPM
BACTERIA UR QL AUTO: ABNORMAL /HPF
BASOPHILS # BLD AUTO: 0.08 THOUSANDS/ΜL (ref 0–0.1)
BASOPHILS NFR BLD AUTO: 1 % (ref 0–1)
BILIRUB SERPL-MCNC: 0.32 MG/DL (ref 0.2–1)
BILIRUB UR QL STRIP: NEGATIVE
BUN SERPL-MCNC: 30 MG/DL (ref 5–25)
CALCIUM ALBUM COR SERPL-MCNC: 9.4 MG/DL (ref 8.3–10.1)
CALCIUM SERPL-MCNC: 8.2 MG/DL (ref 8.3–10.1)
CHLORIDE SERPL-SCNC: 107 MMOL/L (ref 100–108)
CHOLEST SERPL-MCNC: 104 MG/DL
CLARITY UR: CLEAR
CO2 SERPL-SCNC: 32 MMOL/L (ref 21–32)
COLOR UR: ABNORMAL
CREAT SERPL-MCNC: 1.24 MG/DL (ref 0.6–1.3)
EOSINOPHIL # BLD AUTO: 0.22 THOUSAND/ΜL (ref 0–0.61)
EOSINOPHIL NFR BLD AUTO: 4 % (ref 0–6)
ERYTHROCYTE [DISTWIDTH] IN BLOOD BY AUTOMATED COUNT: 15.7 % (ref 11.6–15.1)
EST. AVERAGE GLUCOSE BLD GHB EST-MCNC: 148 MG/DL
FERRITIN SERPL-MCNC: 237 NG/ML (ref 8–388)
FOLATE SERPL-MCNC: 17.1 NG/ML (ref 3.1–17.5)
GFR SERPL CREATININE-BSD FRML MDRD: 44 ML/MIN/1.73SQ M
GLUCOSE SERPL-MCNC: 121 MG/DL (ref 65–140)
GLUCOSE SERPL-MCNC: 130 MG/DL (ref 65–140)
GLUCOSE SERPL-MCNC: 158 MG/DL (ref 65–140)
GLUCOSE SERPL-MCNC: 167 MG/DL (ref 65–140)
GLUCOSE SERPL-MCNC: 184 MG/DL (ref 65–140)
GLUCOSE UR STRIP-MCNC: NEGATIVE MG/DL
HBA1C MFR BLD: 6.8 %
HCT VFR BLD AUTO: 30.9 % (ref 34.8–46.1)
HDLC SERPL-MCNC: 30 MG/DL
HGB BLD-MCNC: 8.9 G/DL (ref 11.5–15.4)
HGB UR QL STRIP.AUTO: NEGATIVE
IMM GRANULOCYTES # BLD AUTO: 0.03 THOUSAND/UL (ref 0–0.2)
IMM GRANULOCYTES NFR BLD AUTO: 1 % (ref 0–2)
INR PPP: 1.34 (ref 0.84–1.19)
IRON SATN MFR SERPL: 16 % (ref 15–50)
IRON SERPL-MCNC: 32 UG/DL (ref 50–170)
KETONES UR STRIP-MCNC: NEGATIVE MG/DL
LDLC SERPL CALC-MCNC: 48 MG/DL (ref 0–100)
LEUKOCYTE ESTERASE UR QL STRIP: ABNORMAL
LYMPHOCYTES # BLD AUTO: 1.54 THOUSANDS/ΜL (ref 0.6–4.47)
LYMPHOCYTES NFR BLD AUTO: 25 % (ref 14–44)
MAGNESIUM SERPL-MCNC: 2.1 MG/DL (ref 1.6–2.6)
MCH RBC QN AUTO: 28.4 PG (ref 26.8–34.3)
MCHC RBC AUTO-ENTMCNC: 28.8 G/DL (ref 31.4–37.4)
MCV RBC AUTO: 99 FL (ref 82–98)
MONOCYTES # BLD AUTO: 0.9 THOUSAND/ΜL (ref 0.17–1.22)
MONOCYTES NFR BLD AUTO: 15 % (ref 4–12)
NEUTROPHILS # BLD AUTO: 3.42 THOUSANDS/ΜL (ref 1.85–7.62)
NEUTS SEG NFR BLD AUTO: 54 % (ref 43–75)
NITRITE UR QL STRIP: NEGATIVE
NON-SQ EPI CELLS URNS QL MICRO: ABNORMAL /HPF
NRBC BLD AUTO-RTO: 0 /100 WBCS
OTHER STN SPEC: ABNORMAL
P AXIS: 42 DEGREES
PH UR STRIP.AUTO: 5 [PH]
PHOSPHATE SERPL-MCNC: 3.8 MG/DL (ref 2.3–4.1)
PLATELET # BLD AUTO: 300 THOUSANDS/UL (ref 149–390)
PMV BLD AUTO: 9.9 FL (ref 8.9–12.7)
POTASSIUM SERPL-SCNC: 4.2 MMOL/L (ref 3.5–5.3)
PR INTERVAL: 174 MS
PROCALCITONIN SERPL-MCNC: 0.08 NG/ML
PROT SERPL-MCNC: 6.3 G/DL (ref 6.4–8.2)
PROT UR STRIP-MCNC: NEGATIVE MG/DL
PROTHROMBIN TIME: 16.2 SECONDS (ref 11.6–14.5)
QRS AXIS: -10 DEGREES
QRSD INTERVAL: 68 MS
QT INTERVAL: 332 MS
QTC INTERVAL: 432 MS
RBC # BLD AUTO: 3.13 MILLION/UL (ref 3.81–5.12)
RBC #/AREA URNS AUTO: ABNORMAL /HPF
SODIUM SERPL-SCNC: 146 MMOL/L (ref 136–145)
SP GR UR STRIP.AUTO: 1.01 (ref 1–1.03)
T WAVE AXIS: 26 DEGREES
TIBC SERPL-MCNC: 198 UG/DL (ref 250–450)
TRIGL SERPL-MCNC: 132 MG/DL
UROBILINOGEN UR QL STRIP.AUTO: 0.2 E.U./DL
VENTRICULAR RATE: 102 BPM
VIT B12 SERPL-MCNC: 338 PG/ML (ref 100–900)
WBC # BLD AUTO: 6.19 THOUSAND/UL (ref 4.31–10.16)
WBC #/AREA URNS AUTO: ABNORMAL /HPF

## 2022-03-10 PROCEDURE — 97530 THERAPEUTIC ACTIVITIES: CPT

## 2022-03-10 PROCEDURE — 84100 ASSAY OF PHOSPHORUS: CPT | Performed by: INTERNAL MEDICINE

## 2022-03-10 PROCEDURE — 97163 PT EVAL HIGH COMPLEX 45 MIN: CPT

## 2022-03-10 PROCEDURE — 85610 PROTHROMBIN TIME: CPT | Performed by: INTERNAL MEDICINE

## 2022-03-10 PROCEDURE — 85025 COMPLETE CBC W/AUTO DIFF WBC: CPT | Performed by: INTERNAL MEDICINE

## 2022-03-10 PROCEDURE — 85730 THROMBOPLASTIN TIME PARTIAL: CPT | Performed by: INTERNAL MEDICINE

## 2022-03-10 PROCEDURE — 93971 EXTREMITY STUDY: CPT

## 2022-03-10 PROCEDURE — 87086 URINE CULTURE/COLONY COUNT: CPT | Performed by: INTERNAL MEDICINE

## 2022-03-10 PROCEDURE — 84145 PROCALCITONIN (PCT): CPT | Performed by: EMERGENCY MEDICINE

## 2022-03-10 PROCEDURE — 82948 REAGENT STRIP/BLOOD GLUCOSE: CPT

## 2022-03-10 PROCEDURE — C8929 TTE W OR WO FOL WCON,DOPPLER: HCPCS

## 2022-03-10 PROCEDURE — 93010 ELECTROCARDIOGRAM REPORT: CPT | Performed by: INTERNAL MEDICINE

## 2022-03-10 PROCEDURE — 83735 ASSAY OF MAGNESIUM: CPT | Performed by: INTERNAL MEDICINE

## 2022-03-10 PROCEDURE — 80053 COMPREHEN METABOLIC PANEL: CPT | Performed by: INTERNAL MEDICINE

## 2022-03-10 PROCEDURE — 97167 OT EVAL HIGH COMPLEX 60 MIN: CPT

## 2022-03-10 PROCEDURE — 99232 SBSQ HOSP IP/OBS MODERATE 35: CPT | Performed by: PHYSICIAN ASSISTANT

## 2022-03-10 PROCEDURE — 80061 LIPID PANEL: CPT | Performed by: INTERNAL MEDICINE

## 2022-03-10 PROCEDURE — 99222 1ST HOSP IP/OBS MODERATE 55: CPT | Performed by: INTERNAL MEDICINE

## 2022-03-10 PROCEDURE — 97535 SELF CARE MNGMENT TRAINING: CPT

## 2022-03-10 PROCEDURE — 93971 EXTREMITY STUDY: CPT | Performed by: SURGERY

## 2022-03-10 PROCEDURE — 71045 X-RAY EXAM CHEST 1 VIEW: CPT

## 2022-03-10 PROCEDURE — 81001 URINALYSIS AUTO W/SCOPE: CPT | Performed by: INTERNAL MEDICINE

## 2022-03-10 PROCEDURE — 99024 POSTOP FOLLOW-UP VISIT: CPT | Performed by: ORTHOPAEDIC SURGERY

## 2022-03-10 RX ORDER — NITROGLYCERIN 0.4 MG/1
0.4 TABLET SUBLINGUAL
Status: DISCONTINUED | OUTPATIENT
Start: 2022-03-10 | End: 2022-03-22 | Stop reason: HOSPADM

## 2022-03-10 RX ORDER — TRAMADOL HYDROCHLORIDE 50 MG/1
50 TABLET ORAL EVERY 6 HOURS PRN
Status: DISCONTINUED | OUTPATIENT
Start: 2022-03-10 | End: 2022-03-22 | Stop reason: HOSPADM

## 2022-03-10 RX ORDER — LISINOPRIL 5 MG/1
5 TABLET ORAL DAILY
Status: DISCONTINUED | OUTPATIENT
Start: 2022-03-10 | End: 2022-03-10

## 2022-03-10 RX ADMIN — INSULIN LISPRO 1 UNITS: 100 INJECTION, SOLUTION INTRAVENOUS; SUBCUTANEOUS at 12:08

## 2022-03-10 RX ADMIN — FUROSEMIDE 40 MG: 10 INJECTION, SOLUTION INTRAMUSCULAR; INTRAVENOUS at 08:30

## 2022-03-10 RX ADMIN — APIXABAN 5 MG: 5 TABLET, FILM COATED ORAL at 08:40

## 2022-03-10 RX ADMIN — METOPROLOL TARTRATE 50 MG: 50 TABLET, FILM COATED ORAL at 21:38

## 2022-03-10 RX ADMIN — TRAMADOL HYDROCHLORIDE 50 MG: 50 TABLET, COATED ORAL at 16:25

## 2022-03-10 RX ADMIN — NYSTATIN: 100000 POWDER TOPICAL at 17:33

## 2022-03-10 RX ADMIN — METOPROLOL TARTRATE 50 MG: 50 TABLET, FILM COATED ORAL at 08:30

## 2022-03-10 RX ADMIN — INSULIN LISPRO 1 UNITS: 100 INJECTION, SOLUTION INTRAVENOUS; SUBCUTANEOUS at 16:34

## 2022-03-10 RX ADMIN — PREGABALIN 100 MG: 100 CAPSULE ORAL at 17:31

## 2022-03-10 RX ADMIN — NYSTATIN: 100000 POWDER TOPICAL at 08:32

## 2022-03-10 RX ADMIN — PRAVASTATIN SODIUM 80 MG: 80 TABLET ORAL at 16:24

## 2022-03-10 RX ADMIN — INSULIN GLARGINE 20 UNITS: 100 INJECTION, SOLUTION SUBCUTANEOUS at 21:39

## 2022-03-10 RX ADMIN — AMLODIPINE BESYLATE 5 MG: 5 TABLET ORAL at 08:30

## 2022-03-10 RX ADMIN — PREGABALIN 100 MG: 100 CAPSULE ORAL at 08:30

## 2022-03-10 RX ADMIN — FUROSEMIDE 40 MG: 10 INJECTION, SOLUTION INTRAMUSCULAR; INTRAVENOUS at 16:24

## 2022-03-10 RX ADMIN — LISINOPRIL 5 MG: 5 TABLET ORAL at 08:30

## 2022-03-10 RX ADMIN — LIDOCAINE 5% 2 PATCH: 700 PATCH TOPICAL at 08:30

## 2022-03-10 RX ADMIN — ONDANSETRON 4 MG: 2 INJECTION INTRAMUSCULAR; INTRAVENOUS at 16:25

## 2022-03-10 RX ADMIN — Medication: at 17:32

## 2022-03-10 RX ADMIN — Medication: at 08:31

## 2022-03-10 RX ADMIN — APIXABAN 5 MG: 5 TABLET, FILM COATED ORAL at 17:31

## 2022-03-10 RX ADMIN — INSULIN LISPRO 1 UNITS: 100 INJECTION, SOLUTION INTRAVENOUS; SUBCUTANEOUS at 21:40

## 2022-03-10 RX ADMIN — MONTELUKAST 10 MG: 10 TABLET, FILM COATED ORAL at 21:38

## 2022-03-10 RX ADMIN — PERFLUTREN 0.6 ML/MIN: 6.52 INJECTION, SUSPENSION INTRAVENOUS at 11:31

## 2022-03-10 RX ADMIN — SODIUM FERRIC GLUCONATE COMPLEX 125 MG: 12.5 INJECTION INTRAVENOUS at 08:31

## 2022-03-10 RX ADMIN — LORATADINE 10 MG: 10 TABLET ORAL at 08:30

## 2022-03-10 NOTE — ASSESSMENT & PLAN NOTE
- s/p fall resulting in Comminuted displaced fracture of the proximal humerus ( 2/23/22)    - s/p left shoulder reverse complete arthroplasty (3/1/22)   - I discussed with Dr Dena Monroy - cleared from ortho standpoint for patient to start Eliquis - consult placed

## 2022-03-10 NOTE — ASSESSMENT & PLAN NOTE
- improved at 9 2   - likely worsened secondary to post op loss   - IV iron ordered since pro calcitonin negative   - unlikely to contribute to anemia at this level   - no blood in stool or urine   - fu B 12, folate level, reticulocyte, hemolysis panel

## 2022-03-10 NOTE — ASSESSMENT & PLAN NOTE
- associated with palpitations   - once HR was controlled nausea resolved   - Last BM today morning - normal   - Zofran IV prn

## 2022-03-10 NOTE — PROGRESS NOTES
Patient currently admitted to [facility] on 3/9/2022 with CHF  This care manager assistant will continue to monitor via chart review throughout bundle episode

## 2022-03-10 NOTE — ED PROVIDER NOTES
History  Chief Complaint   Patient presents with    Decreased Oxygen Level     EMS reports that per nursing home, Pt becomes hypoxic intermittently they want a CT to rule out PE  Pt currently 96% on 2 L, was 89% on RA, patient in A-Fib     70-year-old female with history of atrial fibrillation currently not anticoagulated with recent surgery left shoulder arthroscopic done a week ago presents with shortness of breath and chest pain and palpitations  Patient is from a nursing home was seen yesterday in the ED for chest pain evaluated and discharged  Patient said that she was being discharged back home when her pulse ox dropped and she became tachycardic so family concerned about a pulmonary embolism so brought to the ED for further evaluation and management  Denies any leg swelling nausea vomiting fevers chills cough congestion or any other symptoms at this time  History provided by:  Patient   used: No        Prior to Admission Medications   Prescriptions Last Dose Informant Patient Reported? Taking?    Jose Alfredo Flood 30G X 8 MM 1200 [facility] (Specify) Yes No   Trulicity 1 5 LC/2 0TY SOPN  Outside Facility (Specify) Yes No   Sig: inject 0 5 milliliter subcutaneously every week 28   acetaminophen (TYLENOL) 325 mg tablet  Outside Facility (Specify) Yes No   Sig: Take 650 mg by mouth every 6 (six) hours as needed for mild pain   Patient not taking: Reported on 2/24/2022    albuterol (PROVENTIL HFA,VENTOLIN HFA) 90 mcg/act inhaler 3/8/2022 at Unknown time Outside Facility (Specify) Yes Yes   Sig: Inhale 2 puffs every 6 (six) hours as needed for wheezing   amLODIPine (NORVASC) 5 mg tablet 3/8/2022 at Unknown time Outside Facility (Specify) No Yes   Sig: Take 1 tablet (5 mg total) by mouth daily   ammonium lactate (LAC-HYDRIN) 12 % lotion 3/8/2022 at Unknown time Outside Facility (Specify) Yes Yes   Sig: APPLY TOPICALLY TO AFFECTED AREAS twice a day   apixaban (ELIQUIS) 5 mg Not Taking at Unknown time Outside Facility (Specify) No No   Sig: Take 1 tablet (5 mg total) by mouth 2 (two) times a day   Patient not taking: Reported on 3/9/2022    cephalexin (KEFLEX) 500 mg capsule 3/8/2022 at Unknown time  No Yes   Sig: Take 1 capsule (500 mg total) by mouth 2 (two) times a day for 5 days   desloratadine (CLARINEX) 5 MG tablet  Outside Facility (Specify) Yes No   Sig: Take by mouth daily     insulin glargine (LANTUS) 100 units/mL subcutaneous injection 3/8/2022 at Unknown time  No Yes   Sig: Inject 20 Units under the skin daily at bedtime   lidocaine (LIDODERM) 5 % Not Taking at Unknown time Outside Facility (Specify) No No   Sig: Apply 2 patches topically daily Remove & Discard patch within 12 hours or as directed by MD   Patient not taking: Reported on 3/9/2022    metoprolol tartrate (LOPRESSOR) 25 mg tablet 3/8/2022 at Unknown time Outside Facility (Specify) No Yes   Sig: Take 2 tablets (50 mg total) by mouth every 12 (twelve) hours   montelukast (SINGULAIR) 10 mg tablet 3/8/2022 at Unknown time Outside Facility ([address] [place] [address]) Yes Yes   Sig: Take 10 mg by mouth daily     nystatin (MYCOSTATIN) powder 3/8/2022 at Unknown time Outside Facility ([address] [place] [address]) Yes Yes   Sig: Apply topically 2 (two) times a day   ondansetron (Zofran ODT) 4 mg disintegrating tablet Not Taking at Unknown time Outside Facility (Specify) No No   Sig: Take 1 tablet (4 mg total) by mouth every 6 (six) hours as needed for nausea or vomiting   Patient not taking: Reported on 2/24/2022    polyethylene glycol (MIRALAX) 17 g packet 3/8/2022 at Unknown time  No Yes   Sig: Take 17 g by mouth daily   pregabalin (LYRICA) 100 mg capsule 3/8/2022 at Unknown time Outside Facility ([address] [place] [address]) Yes Yes   Sig: Take 100 mg by mouth 2 (two) times a day    rosuvastatin (CRESTOR) 10 MG tablet 3/8/2022 at Unknown time Outside Facility ([address] [place] [address]) Yes Yes   Sig: 10 mg daily        Facility-Administered Medications: None       Past Medical History: Diagnosis Date    A-fib ([facility] [address] )     Anemia     Asthma     Chronic kidney disease     CKD (chronic kidney disease)     Diabetes mellitus (HCC)     GERD (gastroesophageal reflux disease)     Hyperlipidemia     Hypertension     Kidney stones     PONV (postoperative nausea and vomiting)     SVT (supraventricular tachycardia) (HCC)        Past Surgical History:   Procedure Laterality Date    APPENDECTOMY      CYSTOSCOPY W/ LASER LITHOTRIPSY Left 2016    Procedure: CYSTOSCOPY URETEROSCOPY WITH LITHOTRIPSY HOLMIUM LASER, RETROGRADE PYELOGRAM AND INSERTION STENT URETERAL;  Surgeon: Heath Harden MD;  Location: [address] [place] [address];  Service:     DILATION AND CURETTAGE OF UTERUS      JOINT REPLACEMENT      right knee    KNEE ARTHROPLASTY Right     MN CYSTOURETHROSCOPY,URETER CATHETER Left 2016    Procedure: CYSTOSCOPY RETROGRADE PYELOGRAM WITH INSERTION STENT URETERAL, left;  Surgeon: Heath Harden MD;  Location: [address] [place] [address];  Service: Urology    MN RECONSTR TOTAL SHOULDER IMPLANT Left 3/1/2022    Procedure: ARTHROPLASTY SHOULDER REVERSE;  Surgeon: Marcellus Ayala MD;  Location: [facility];  Service: Orthopedics    SHOULDER ARTHROTOMY Left        Family History   Problem Relation Age of Onset    Heart disease Mother     Emphysema Maternal Grandmother     Heart disease Family      I have reviewed and agree with the history as documented  E-Cigarette/Vaping    E-Cigarette Use Never User      E-Cigarette/Vaping Substances     Social History     Tobacco Use    Smoking status: Former Smoker     Packs/day: 1 00     Years: 20 00     Pack years: 20 00     Types: Cigarettes     Quit date: 1996     Years since quittin 6    Smokeless tobacco: Never Used   Vaping Use    Vaping Use: Never used   Substance Use Topics    Alcohol use: Not Currently     Comment: rarely    Drug use: No       Review of Systems   Constitutional: Negative  HENT: Negative  Eyes: Negative  Respiratory: Positive for shortness of breath  Cardiovascular: Positive for chest pain and palpitations  Gastrointestinal: Negative  Endocrine: Negative  Genitourinary: Negative  Musculoskeletal: Negative  Skin: Negative  Allergic/Immunologic: Negative  Neurological: Negative  Hematological: Negative  Psychiatric/Behavioral: Negative  All other systems reviewed and are negative  Physical Exam  Physical Exam  Constitutional:       Appearance: Normal appearance  HENT:      Head: Normocephalic and atraumatic  Nose: Nose normal       Mouth/Throat:      Mouth: Mucous membranes are moist    Eyes:      Extraocular Movements: Extraocular movements intact  Pupils: Pupils are equal, round, and reactive to light  Cardiovascular:      Rate and Rhythm: Normal rate and regular rhythm  Pulmonary:      Effort: Pulmonary effort is normal  No respiratory distress  Breath sounds: Normal breath sounds  No stridor  No wheezing, rhonchi or rales  Comments: Diminished breath sounds bilaterally  Chest:      Chest wall: No tenderness  Abdominal:      General: Abdomen is flat  Bowel sounds are normal       Palpations: Abdomen is soft  Musculoskeletal:      Cervical back: Normal range of motion and neck supple  Comments: Left shoulder with wound noted status post surgical repair with no drainage no erythema  Skin:     General: Skin is warm  Capillary Refill: Capillary refill takes less than 2 seconds  Neurological:      General: No focal deficit present  Mental Status: She is alert and oriented to person, place, and time  Mental status is at baseline  Psychiatric:         Mood and Affect: Mood normal          Thought Content:  Thought content normal          Vital Signs  ED Triage Vitals   Temperature Pulse Respirations Blood Pressure SpO2   03/09/22 2005 03/09/22 1630 03/09/22 1642 03/09/22 1630 03/09/22 1630   98 8 °F (37 1 °C) (!) 112 18 148/65 95 % Temp Source Heart Rate Source Patient Position - Orthostatic VS BP Location FiO2 (%)   03/09/22 2005 03/09/22 1642 03/09/22 1642 03/09/22 1642 --   Tympanic Monitor Lying Left arm       Pain Score       03/09/22 1642       No Pain           Vitals:    03/10/22 0140 03/10/22 0346 03/10/22 0734 03/10/22 1514   BP:  163/81 118/74 147/75   Pulse: 85 95 90 73   Patient Position - Orthostatic VS:             Visual Acuity      ED Medications  Medications   metoprolol (LOPRESSOR) injection 5 mg (has no administration in time range)   magnesium hydroxide (MILK OF MAGNESIA) oral suspension 30 mL (has no administration in time range)   ondansetron (ZOFRAN) injection 4 mg (4 mg Intravenous Given 3/10/22 1625)   aluminum-magnesium hydroxide-simethicone (MYLANTA) oral suspension 30 mL (has no administration in time range)   pravastatin (PRAVACHOL) tablet 80 mg (80 mg Oral Given 3/10/22 1624)   pregabalin (LYRICA) capsule 100 mg (100 mg Oral Given 3/10/22 0830)   nystatin (MYCOSTATIN) powder ( Topical Given 3/10/22 0832)   montelukast (SINGULAIR) tablet 10 mg (10 mg Oral Given 3/9/22 2232)   metoprolol tartrate (LOPRESSOR) tablet 50 mg (50 mg Oral Given 3/10/22 0830)   lidocaine (LIDODERM) 5 % patch 2 patch (2 patches Topical Medication Applied 3/10/22 0830)   loratadine (CLARITIN) tablet 10 mg (10 mg Oral Given 3/10/22 0830)   ammonium lactate (LAC-HYDRIN) 12 % lotion ( Topical Given 3/10/22 0831)   amLODIPine (NORVASC) tablet 5 mg (5 mg Oral Given 3/10/22 0830)   albuterol (PROVENTIL HFA,VENTOLIN HFA) inhaler 2 puff (has no administration in time range)   insulin glargine (LANTUS) subcutaneous injection 20 Units 0 2 mL (20 Units Subcutaneous Given 3/9/22 2242)   apixaban (ELIQUIS) tablet 5 mg (5 mg Oral Given 3/10/22 0840)   furosemide (LASIX) injection 40 mg (40 mg Intravenous Given 3/10/22 1624)   insulin lispro (HumaLOG) 100 units/mL subcutaneous injection 1-5 Units (1 Units Subcutaneous Given 3/10/22 1634)   insulin lispro (HumaLOG) 100 units/mL subcutaneous injection 1-5 Units (1 Units Subcutaneous Given 3/9/22 2243)   ferric gluconate (FERRLECIT) injection 125 mg (125 mg Intravenous Given 3/10/22 0831)   nitroglycerin (NITROSTAT) SL tablet 0 4 mg (has no administration in time range)   traMADol (ULTRAM) tablet 50 mg (50 mg Oral Given 3/10/22 1625)   traMADol (ULTRAM) tablet 50 mg (50 mg Oral Given 3/9/22 1958)   ondansetron (ZOFRAN) injection 4 mg (4 mg Intravenous Given 3/9/22 1912)   iohexol (OMNIPAQUE) 350 MG/ML injection (SINGLE-DOSE) 85 mL (85 mL Intravenous Given 3/9/22 1958)   furosemide (LASIX) injection 40 mg (40 mg Intravenous Given 3/9/22 2148)   metoclopramide (REGLAN) injection 10 mg (10 mg Intravenous Given 3/9/22 2106)   metoprolol (LOPRESSOR) injection 5 mg (5 mg Intravenous Given 3/9/22 2124)   perflutren lipid microsphere (DEFINITY) injection (0 6 mL/min Intravenous Given 3/10/22 1131)       Diagnostic Studies  Results Reviewed     Procedure Component Value Units Date/Time    Blood culture #1 [[id number]] Collected: 03/09/22 2106    Lab Status: Preliminary result Specimen: Blood from Hand, Left Updated: 03/10/22 1014     Blood Culture Received in Microbiology Lab  Culture in Progress  Blood culture #2 [[id number]] Collected: 03/09/22 2126    Lab Status: Preliminary result Specimen: Blood from Arm, Left Updated: 03/10/22 1014     Blood Culture Received in Microbiology Lab  Culture in Progress      Vitamin B12 [[id number]]  (Normal) Collected: 03/09/22 1830    Lab Status: Final result Specimen: Blood from Arm, Right Updated: 03/10/22 0955     Vitamin B-12 338 pg/mL     Folate [[id number]]  (Normal) Collected: 03/09/22 1830    Lab Status: Final result Specimen: Blood from Arm, Right Updated: 03/10/22 0955     Folate 17 1 ng/mL     Iron Saturation % [[id number]]  (Abnormal) Collected: 03/09/22 1830    Lab Status: Final result Specimen: Blood from Arm, Right Updated: 03/10/22 0955     Iron Saturation 16 %      TIBC 198 ug/dL      Iron 32 ug/dL     Ferritin [[id number]]  (Normal) Collected: 03/09/22 1830    Lab Status: Final result Specimen: Blood from Arm, Right Updated: 03/10/22 0955     Ferritin 237 ng/mL     Hemoglobin A1C [[id number]]  (Abnormal) Collected: 03/09/22 1830    Lab Status: Final result Specimen: Blood Updated: 03/10/22 0947     Hemoglobin A1C 6 8 %       mg/dl     APTT [[id number]]  (Abnormal) Collected: 03/10/22 0513    Lab Status: Final result Specimen: Blood from Arm, Right Updated: 03/10/22 0641     PTT 40 seconds     Protime-INR [[id number]]  (Abnormal) Collected: 03/10/22 0513    Lab Status: Final result Specimen: Blood from Arm, Right Updated: 03/10/22 0641     Protime 16 2 seconds      INR 1 34    Comprehensive metabolic panel [[id number]]  (Abnormal) Collected: 03/10/22 0513    Lab Status: Final result Specimen: Blood from Arm, Right Updated: 03/10/22 8718     Sodium 146 mmol/L      Potassium 4 2 mmol/L      Chloride 107 mmol/L      CO2 32 mmol/L      ANION GAP 7 mmol/L      BUN 30 mg/dL      Creatinine 1 24 mg/dL      Glucose 130 mg/dL      Calcium 8 2 mg/dL      Corrected Calcium 9 4 mg/dL      AST 14 U/L      ALT 18 U/L      Alkaline Phosphatase 79 U/L      Total Protein 6 3 g/dL      Albumin 2 5 g/dL      Total Bilirubin 0 32 mg/dL      eGFR 44 ml/min/1 73sq m     Narrative:      [place] guidelines for Chronic Kidney Disease (CKD):     Stage 1 with normal or high GFR (GFR > 90 mL/min/1 73 square meters)    Stage 2 Mild CKD (GFR = 60-89 mL/min/1 73 square meters)    Stage 3A Moderate CKD (GFR = 45-59 mL/min/1 73 square meters)    Stage 3B Moderate CKD (GFR = 30-44 mL/min/1 73 square meters)    Stage 4 Severe CKD (GFR = 15-29 mL/min/1 73 square meters)    Stage 5 End Stage CKD (GFR <15 mL/min/1 73 square meters)  Note: GFR calculation is accurate only with a steady state creatinine    Phosphorus [[id number]]  (Normal) Collected: 03/10/22 0513    Lab Status: Final result Specimen: Blood from Arm, Right Updated: 03/10/22 7261     Phosphorus 3 8 mg/dL     Lipid Panel with Direct LDL reflex [[id number]]  (Abnormal) Collected: 03/10/22 0513    Lab Status: Final result Specimen: Blood from Arm, Right Updated: 03/10/22 3566     Cholesterol 104 mg/dL      Triglycerides 132 mg/dL      HDL, Direct 30 mg/dL      LDL Calculated 48 mg/dL     Magnesium [[id number]]  (Normal) Collected: 03/10/22 0513    Lab Status: Final result Specimen: Blood from Arm, Right Updated: 03/10/22 0794     Magnesium 2 1 mg/dL     CBC and differential [[id number]]  (Abnormal) Collected: 03/10/22 0513    Lab Status: Final result Specimen: Blood from Arm, Right Updated: 03/10/22 0610     WBC 6 19 Thousand/uL      RBC 3 13 Million/uL      Hemoglobin 8 9 g/dL      Hematocrit 30 9 %      MCV 99 fL      MCH 28 4 pg      MCHC 28 8 g/dL      RDW 15 7 %      MPV 9 9 fL      Platelets 725 Thousands/uL      nRBC 0 /100 WBCs      Neutrophils Relative 54 %      Immat GRANS % 1 %      Lymphocytes Relative 25 %      Monocytes Relative 15 %      Eosinophils Relative 4 %      Basophils Relative 1 %      Neutrophils Absolute 3 42 Thousands/µL      Immature Grans Absolute 0 03 Thousand/uL      Lymphocytes Absolute 1 54 Thousands/µL      Monocytes Absolute 0 90 Thousand/µL      Eosinophils Absolute 0 22 Thousand/µL      Basophils Absolute 0 08 Thousands/µL     HS Troponin I 4hr [[id number]]  (Normal) Collected: 03/09/22 2228    Lab Status: Final result Specimen: Blood from Arm, Right Updated: 03/09/22 2302     hs TnI 4hr 7 ng/L      Delta 4hr hsTnI 0 ng/L     Retic Count [[id number]]  (Abnormal) Collected: 03/09/22 1830    Lab Status: Final result Specimen: Blood from Arm, Right Updated: 03/09/22 2236     Retic Ct Abs 129,700     Retic Ct Pct 3 98 %     TSH, 3rd generation with Free T4 reflex [[id number]]  (Normal) Collected: 03/09/22 1830    Lab Status: Final result Specimen: Blood from Arm, Right Updated: 03/09/22 2221     TSH 3RD SHIRAELIE 3 550 uIU/mL     Narrative:      Patients undergoing fluorescein dye angiography may retain small amounts of fluorescein in the body for 48-72 hours post procedure  Samples containing fluorescein can produce falsely depressed TSH values  If the patient had this procedure,a specimen should be resubmitted post fluorescein clearance  Procalcitonin with AM Reflex [[id number]]  (Normal) Collected: 03/09/22 2106    Lab Status: Final result Specimen: Blood from Arm, Left Updated: 03/09/22 2211     Procalcitonin 0 08 ng/ml     HS Troponin I 2hr [[id number]]  (Normal) Collected: 03/09/22 2040    Lab Status: Final result Specimen: Blood from Arm, Right Updated: 03/09/22 2121     hs TnI 2hr 7 ng/L      Delta 2hr hsTnI 0 ng/L     COVID/FLU/RSV - 2 hour TAT [[id number]]  (Normal) Collected: 03/09/22 1830    Lab Status: Final result Specimen: Nares from Nose Updated: 03/09/22 1935     SARS-CoV-2 Negative     INFLUENZA A PCR Negative     INFLUENZA B PCR Negative     RSV PCR Negative    Narrative:      FOR PEDIATRIC PATIENTS - copy/paste COVID Guidelines URL to browser: [email or web address]    SARS-CoV-2 assay is a Nucleic Acid Amplification assay intended for the  qualitative detection of nucleic acid from SARS-CoV-2 in nasopharyngeal  swabs  Results are for the presumptive identification of SARS-CoV-2 RNA  Positive results are indicative of infection with SARS-CoV-2, the virus  causing COVID-19, but do not rule out bacterial infection or co-infection  with other viruses  Laboratories within the [place] and its  territories are required to report all positive results to the appropriate  public health authorities  Negative results do not preclude SARS-CoV-2  infection and should not be used as the sole basis for treatment or other  patient management decisions   Negative results must be combined with  clinical observations, patient history, and epidemiological information  This test has not been FDA cleared or approved  This test has been authorized by FDA under an Emergency Use Authorization  (EUA)  This test is only authorized for the duration of time the  declaration that circumstances exist justifying the authorization of the  emergency use of an in vitro diagnostic tests for detection of SARS-CoV-2  virus and/or diagnosis of COVID-19 infection under section 564(b)(1) of  the Act, 21 U  S C  422AYW-2(Y)(8), unless the authorization is terminated  or revoked sooner  The test has been validated but independent review by FDA  and CLIA is pending  Test performed using [employer] GeneXpert: This RT-PCR assay targets N2,  a region unique to SARS-CoV-2  A conserved region in the E-gene was chosen  for pan-Sarbecovirus detection which includes SARS-CoV-2      HS Troponin 0hr (reflex protocol) [[id number]]  (Normal) Collected: 03/09/22 1830    Lab Status: Final result Specimen: Blood from Arm, Right Updated: 03/09/22 1921     hs TnI 0hr 7 ng/L     Protime-INR [[id number]]  (Abnormal) Collected: 03/09/22 1830    Lab Status: Final result Specimen: Blood from Arm, Right Updated: 03/09/22 1915     Protime 15 2 seconds      INR 1 22    APTT [[id number]]  (Normal) Collected: 03/09/22 1830    Lab Status: Final result Specimen: Blood from Arm, Right Updated: 03/09/22 1915     PTT 37 seconds     NT-BNP PRO [[id number]]  (Abnormal) Collected: 03/09/22 1830    Lab Status: Final result Specimen: Blood from Arm, Right Updated: 03/09/22 1906     NT-proBNP 5,992 pg/mL     Comprehensive metabolic panel [[id number]]  (Abnormal) Collected: 03/09/22 1830    Lab Status: Final result Specimen: Blood from Arm, Right Updated: 03/09/22 1900     Sodium 146 mmol/L      Potassium 4 6 mmol/L      Chloride 108 mmol/L      CO2 31 mmol/L      ANION GAP 7 mmol/L      BUN 32 mg/dL      Creatinine 1 14 mg/dL      Glucose 149 mg/dL      Calcium 8 4 mg/dL      Corrected Calcium 9 7 mg/dL      AST 13 U/L      ALT 17 U/L      Alkaline Phosphatase 84 U/L      Total Protein 6 2 g/dL      Albumin 2 4 g/dL      Total Bilirubin 0 41 mg/dL      eGFR 49 ml/min/1 73sq m     Narrative:      [place] guidelines for Chronic Kidney Disease (CKD):     Stage 1 with normal or high GFR (GFR > 90 mL/min/1 73 square meters)    Stage 2 Mild CKD (GFR = 60-89 mL/min/1 73 square meters)    Stage 3A Moderate CKD (GFR = 45-59 mL/min/1 73 square meters)    Stage 3B Moderate CKD (GFR = 30-44 mL/min/1 73 square meters)    Stage 4 Severe CKD (GFR = 15-29 mL/min/1 73 square meters)    Stage 5 End Stage CKD (GFR <15 mL/min/1 73 square meters)  Note: GFR calculation is accurate only with a steady state creatinine    Magnesium [[id number]]  (Normal) Collected: 03/09/22 1830    Lab Status: Final result Specimen: Blood from Arm, Right Updated: 03/09/22 1856     Magnesium 2 3 mg/dL     CBC and differential [[id number]]  (Abnormal) Collected: 03/09/22 1830    Lab Status: Final result Specimen: Blood from Arm, Right Updated: 03/09/22 1842     WBC 6 19 Thousand/uL      RBC 3 29 Million/uL      Hemoglobin 9 2 g/dL      Hematocrit 31 7 %      MCV 96 fL      MCH 28 0 pg      MCHC 29 0 g/dL      RDW 15 5 %      MPV 10 0 fL      Platelets 045 Thousands/uL      nRBC 0 /100 WBCs      Neutrophils Relative 56 %      Immat GRANS % 1 %      Lymphocytes Relative 24 %      Monocytes Relative 13 %      Eosinophils Relative 4 %      Basophils Relative 2 %      Neutrophils Absolute 3 57 Thousands/µL      Immature Grans Absolute 0 03 Thousand/uL      Lymphocytes Absolute 1 46 Thousands/µL      Monocytes Absolute 0 80 Thousand/µL      Eosinophils Absolute 0 24 Thousand/µL      Basophils Absolute 0 09 Thousands/µL                  XR chest portable   Final Result by Rosey Morales MD (03/10 4950)      Increased pulmonary vascular congestion and development of small bilateral pleural effusions                    Workstation performed: [id number]         CTA ED chest PE Study   Final Result by Ernesto Ramirez MD (03/09 2032)         1  Small bilateral pleural effusions and bibasilar atelectasis  Few airspace opacities in the right lung could represent mild aspiration  2   No evidence of acute pulmonary embolus, thoracic aortic aneurysm or dissection  Workstation performed: [id number]         XR chest 1 view portable   Final Result by Cari Reyes MD (03/10 5868)      Hypoventilation with subsegmental atelectasis at the bases  Developing edema or inflammatory infiltrates are difficult to exclude        Please see subsequent CT for further report                  Workstation performed: [id number]         VAS upper limb venous duplex scan, unilateral/limited    (Results Pending)              Procedures  ECG 12 Lead Documentation Only  Performed by: Kiersten Larose DO  Authorized by: Kiersten Larose DO     ECG reviewed by me, the ED Provider: yes    Patient location:  ED  Previous ECG:     Previous ECG:  Unavailable    Comparison to cardiac monitor: Yes    Interpretation:     Interpretation: non-specific    Rate:     ECG rate assessment: normal    Rhythm:     Rhythm: atrial fibrillation    Ectopy:     Ectopy: none    QRS:     QRS axis:  Normal  Conduction:     Conduction: normal    ST segments:     ST segments:  Non-specific  T waves:     T waves: non-specific               ED Course                                             MDM  Number of Diagnoses or Management Options     Amount and/or Complexity of Data Reviewed  Clinical lab tests: ordered and reviewed  Tests in the radiology section of CPT®: ordered and reviewed  Tests in the medicine section of CPT®: ordered and reviewed    Patient Progress  Patient progress: stable      Disposition  Final diagnoses:   CHF (congestive heart failure) ([facility] [address] )     Time reflects when diagnosis was documented in both MDM as applicable and the Disposition within this note     Time User Action Codes Description Comment    3/9/2022  8:51 PM Tejas Camacho Add [I50 9] CHF (congestive heart failure) ([facility] [address] )     3/9/2022  9:37 PM Sophy Busch Add [P26 819] Status post total replacement of left shoulder       ED Disposition     ED Disposition Condition Date/Time Comment    Admit Stable Wed Mar 9, 2022  8:51 PM          Follow-up Information    None         Current Discharge Medication List      CONTINUE these medications which have NOT CHANGED    Details   albuterol (PROVENTIL HFA,VENTOLIN HFA) 90 mcg/act inhaler Inhale 2 puffs every 6 (six) hours as needed for wheezing      amLODIPine (NORVASC) 5 mg tablet Take 1 tablet (5 mg total) by mouth daily  Qty: 30 tablet, Refills: 0    Associated Diagnoses: Essential hypertension      ammonium lactate (LAC-HYDRIN) 12 % lotion APPLY TOPICALLY TO AFFECTED AREAS twice a day      cephalexin (KEFLEX) 500 mg capsule Take 1 capsule (500 mg total) by mouth 2 (two) times a day for 5 days  Qty: 10 capsule, Refills: 0    Associated Diagnoses: UTI (urinary tract infection)      insulin glargine (LANTUS) 100 units/mL subcutaneous injection Inject 20 Units under the skin daily at bedtime  Qty: 10 mL, Refills: 0    Associated Diagnoses: Diabetes mellitus, type 2 (HCC)      metoprolol tartrate (LOPRESSOR) 25 mg tablet Take 2 tablets (50 mg total) by mouth every 12 (twelve) hours  Qty: 180 tablet, Refills: 3    Associated Diagnoses: Paroxysmal atrial fibrillation (HCC)      montelukast (SINGULAIR) 10 mg tablet Take 10 mg by mouth daily        nystatin (MYCOSTATIN) powder Apply topically 2 (two) times a day      polyethylene glycol (MIRALAX) 17 g packet Take 17 g by mouth daily  Refills: 0    Associated Diagnoses: Diabetes mellitus, type 2 (HCC)      pregabalin (LYRICA) 100 mg capsule Take 100 mg by mouth 2 (two) times a day       rosuvastatin (CRESTOR) 10 MG tablet 10 mg daily    Refills: 0      acetaminophen (TYLENOL) 325 mg tablet Take 650 mg by mouth every 6 (six) hours as needed for mild pain      apixaban (ELIQUIS) 5 mg Take 1 tablet (5 mg total) by mouth 2 (two) times a day  Qty: 60 tablet, Refills: 0    Associated Diagnoses: Paroxysmal atrial fibrillation (HCC)      desloratadine (CLARINEX) 5 MG tablet Take by mouth daily        lidocaine (LIDODERM) 5 % Apply 2 patches topically daily Remove & Discard patch within 12 hours or as directed by MD  Refills: 0    Associated Diagnoses: Closed fracture of head of left humerus, initial encounter      NOVOFINE AUTOCOVER 30G X 8 MM MISC Refills: 0      ondansetron (Zofran ODT) 4 mg disintegrating tablet Take 1 tablet (4 mg total) by mouth every 6 (six) hours as needed for nausea or vomiting  Qty: 20 tablet, Refills: 0    Associated Diagnoses: Displaced fracture of left humerus      Trulicity 1 5 NB/7 6BZ SOPN inject 0 5 milliliter subcutaneously every week 28             No discharge procedures on file      PDMP Review       Value Time User    PDMP Reviewed  Yes 2/20/2022  1:42 PM [address] [place] [address]          ED Provider  Electronically Signed by           Kita Bence, DO  03/10/22 7600

## 2022-03-10 NOTE — ASSESSMENT & PLAN NOTE
- on arrival rapid a fib with RVR as per ER   - responded to IV lopressor - continue 5 mg IV prn   - continue metoprolol tartrate 50 mg PO BID at home   - DEMETRIS Vasc score 3   - Dr Meche Mcgowan cleared to resume Eliquis   - as per cardiology note for pre op clearance 3/1/22:  Patient typically has episodes of paroxysmal SVT & a fib was only in past - as per note - if no recurrence then consideration for dc Eliquis - cardiology consulted    - tele monitoring

## 2022-03-10 NOTE — ASSESSMENT & PLAN NOTE
Lab Results   Component Value Date    HGBA1C 8 2 (H) 03/02/2022       Recent Labs     03/07/22  0736 03/07/22  1058 03/09/22  0833 03/09/22 2229   POCGLU 68 89 135 152*       Blood Sugar Average: Last 72 hrs:  (P) 152     - on Lantus 20 units SQ daily   - Insulin Sliding scale   - A1c ordered

## 2022-03-10 NOTE — PLAN OF CARE
Problem: Nutrition/Hydration-ADULT  Goal: Nutrient/Hydration intake appropriate for improving, restoring or maintaining nutritional needs  Description: Monitor and assess patient's nutrition/hydration status for malnutrition  Collaborate with interdisciplinary team and initiate plan and interventions as ordered  Monitor patient's weight and dietary intake as ordered or per policy  Utilize nutrition screening tool and intervene as necessary  Determine patient's food preferences and provide high-protein, high-caloric foods as appropriate       INTERVENTIONS:  - Monitor oral intake, urinary output, labs, and treatment plans  - Assess nutrition and hydration status and recommend course of action  - Evaluate amount of meals eaten  - Assist patient with eating if necessary   - Allow adequate time for meals  - Recommend/ encourage appropriate diets, oral nutritional supplements, and vitamin/mineral supplements  - Order, calculate, and assess calorie counts as needed  - Recommend, monitor, and adjust tube feedings and TPN/PPN based on assessed needs  - Assess need for intravenous fluids  - Provide specific nutrition/hydration education as appropriate  - Include patient/family/caregiver in decisions related to nutrition  Outcome: Progressing     Problem: Prexisting or High Potential for Compromised Skin Integrity  Goal: Skin integrity is maintained or improved  Description: INTERVENTIONS:  - Identify patients at risk for skin breakdown  - Assess and monitor skin integrity  - Assess and monitor nutrition and hydration status  - Monitor labs   - Assess for incontinence   - Turn and reposition patient  - Assist with mobility/ambulation  - Relieve pressure over bony prominences  - Avoid friction and shearing  - Provide appropriate hygiene as needed including keeping skin clean and dry  - Evaluate need for skin moisturizer/barrier cream  - Collaborate with interdisciplinary team   - Patient/family teaching  - Consider wound care consult   Outcome: Progressing     Problem: Potential for Falls  Goal: Patient will remain free of falls  Description: INTERVENTIONS:  - Educate patient/family on patient safety including physical limitations  - Instruct patient to call for assistance with activity   - Consult OT/PT to assist with strengthening/mobility   - Keep Call bell within reach  - Keep bed low and locked with side rails adjusted as appropriate  - Keep care items and personal belongings within reach  - Initiate and maintain comfort rounds  - Make Fall Risk Sign visible to staff  - Offer Toileting every 2 Hours, in advance of need  - Initiate/Maintain bed alarm  - Obtain necessary fall risk management equipment: yellow socks  - Apply yellow socks and bracelet for high fall risk patients  - Consider moving patient to room near nurses station  Outcome: Progressing     Problem: MOBILITY - ADULT  Goal: Maintain or return to baseline ADL function  Description: INTERVENTIONS:  -  Assess patient's ability to carry out ADLs; assess patient's baseline for ADL function and identify physical deficits which impact ability to perform ADLs (bathing, care of mouth/teeth, toileting, grooming, dressing, etc )  - Assess/evaluate cause of self-care deficits   - Assess range of motion  - Assess patient's mobility; develop plan if impaired  - Assess patient's need for assistive devices and provide as appropriate  - Encourage maximum independence but intervene and supervise when necessary  - Involve family in performance of ADLs  - Assess for home care needs following discharge   - Consider OT consult to assist with ADL evaluation and planning for discharge  - Provide patient education as appropriate  Outcome: Progressing  Goal: Maintains/Returns to pre admission functional level  Description: INTERVENTIONS:  - Perform BMAT or MOVE assessment daily    - Set and communicate daily mobility goal to care team and patient/family/caregiver     - Collaborate with rehabilitation services on mobility goals if consulted  - Perform Range of Motion 4 times a day  - Reposition patient every 2 hours    - Dangle patient 3 times a day  - Stand patient 3 times a day  - Ambulate patient 3 times a day  - Out of bed to chair 3 times a day   - Out of bed for meals 2 times a day  - Out of bed for toileting  - Record patient progress and toleration of activity level   Outcome: Progressing     Problem: PAIN - ADULT  Goal: Verbalizes/displays adequate comfort level or baseline comfort level  Description: Interventions:  - Encourage patient to monitor pain and request assistance  - Assess pain using appropriate pain scale  - Administer analgesics based on type and severity of pain and evaluate response  - Implement non-pharmacological measures as appropriate and evaluate response  - Consider cultural and social influences on pain and pain management  - Notify physician/advanced practitioner if interventions unsuccessful or patient reports new pain  Outcome: Progressing     Problem: INFECTION - ADULT  Goal: Absence or prevention of progression during hospitalization  Description: INTERVENTIONS:  - Assess and monitor for signs and symptoms of infection  - Monitor lab/diagnostic results  - Monitor all insertion sites, i e  indwelling lines, tubes, and drains  - Monitor endotracheal if appropriate and nasal secretions for changes in amount and color  - [place] appropriate cooling/warming therapies per order  - Administer medications as ordered  - Instruct and encourage patient and family to use good hand hygiene technique  - Identify and instruct in appropriate isolation precautions for identified infection/condition  Outcome: Progressing     Problem: SAFETY ADULT  Goal: Patient will remain free of falls  Description: INTERVENTIONS:  - Educate patient/family on patient safety including physical limitations  - Instruct patient to call for assistance with activity   - Consult OT/PT to assist with strengthening/mobility   - Keep Call bell within reach  - Keep bed low and locked with side rails adjusted as appropriate  - Keep care items and personal belongings within reach  - Initiate and maintain comfort rounds  - Make Fall Risk Sign visible to staff  - Offer Toileting every 2 Hours, in advance of need  - Initiate/Maintain bed alarm  - Obtain necessary fall risk management equipment: yellow socks  - Apply yellow socks and bracelet for high fall risk patients  - Consider moving patient to room near nurses station  Outcome: Progressing  Goal: Maintain or return to baseline ADL function  Description: INTERVENTIONS:  -  Assess patient's ability to carry out ADLs; assess patient's baseline for ADL function and identify physical deficits which impact ability to perform ADLs (bathing, care of mouth/teeth, toileting, grooming, dressing, etc )  - Assess/evaluate cause of self-care deficits   - Assess range of motion  - Assess patient's mobility; develop plan if impaired  - Assess patient's need for assistive devices and provide as appropriate  - Encourage maximum independence but intervene and supervise when necessary  - Involve family in performance of ADLs  - Assess for home care needs following discharge   - Consider OT consult to assist with ADL evaluation and planning for discharge  - Provide patient education as appropriate  Outcome: Progressing  Goal: Maintains/Returns to pre admission functional level  Description: INTERVENTIONS:  - Perform BMAT or MOVE assessment daily    - Set and communicate daily mobility goal to care team and patient/family/caregiver  - Collaborate with rehabilitation services on mobility goals if consulted  - Perform Range of Motion 4 times a day  - Reposition patient every 2 hours    - Dangle patient 3 times a day  - Stand patient 3 times a day  - Ambulate patient 3 times a day  - Out of bed to chair 3 times a day   - Out of bed for meals 3 times a day  - Out of bed for toileting  - Record patient progress and toleration of activity level   Outcome: Progressing     Problem: DISCHARGE PLANNING  Goal: Discharge to home or other facility with appropriate resources  Description: INTERVENTIONS:  - Identify barriers to discharge w/patient and caregiver  - Arrange for needed discharge resources and transportation as appropriate  - Identify discharge learning needs (meds, wound care, etc )  - Arrange for interpretive services to assist at discharge as needed  - Refer to Case Management Department for coordinating discharge planning if the patient needs post-hospital services based on physician/advanced practitioner order or complex needs related to functional status, cognitive ability, or social support system  Outcome: Progressing     Problem: Knowledge Deficit  Goal: Patient/family/caregiver demonstrates understanding of disease process, treatment plan, medications, and discharge instructions  Description: Complete learning assessment and assess knowledge base    Interventions:  - Provide teaching at level of understanding  - Provide teaching via preferred learning methods  Outcome: Progressing     Problem: CARDIOVASCULAR - ADULT  Goal: Maintains optimal cardiac output and hemodynamic stability  Description: INTERVENTIONS:  - Monitor I/O, vital signs and rhythm  - Monitor for S/S and trends of decreased cardiac output  - Administer and titrate ordered vasoactive medications to optimize hemodynamic stability  - Assess quality of pulses, skin color and temperature  - Assess for signs of decreased coronary artery perfusion  - Instruct patient to report change in severity of symptoms  Outcome: Progressing  Goal: Absence of cardiac dysrhythmias or at baseline rhythm  Description: INTERVENTIONS:  - Continuous cardiac monitoring, vital signs, obtain 12 lead EKG if ordered  - Administer antiarrhythmic and heart rate control medications as ordered  - Monitor electrolytes and administer replacement therapy as ordered  Outcome: Progressing     Problem: RESPIRATORY - ADULT  Goal: Achieves optimal ventilation and oxygenation  Description: INTERVENTIONS:  - Assess for changes in respiratory status  - Assess for changes in mentation and behavior  - Position to facilitate oxygenation and minimize respiratory effort  - Oxygen administered by appropriate delivery if ordered  - Initiate smoking cessation education as indicated  - Encourage broncho-pulmonary hygiene including cough, deep breathe, Incentive Spirometry  - Assess the need for suctioning and aspirate as needed  - Assess and instruct to report SOB or any respiratory difficulty  - Respiratory Therapy support as indicated  Outcome: Progressing

## 2022-03-10 NOTE — CONSULTS
Consultation - Cardiology   Aleyda Lovett 73 y o  female MRN: [id number]  Unit/Bed#: 92540 [facility] 401-01 Encounter: [id number]    Assessment/Plan     Assessment:    1  Acute on chronic diastolic heart failure  2  Left hand edema number well left upper extremity DVT  3  Paroxysmal atrial fibrillation  4  Hypertension  5  Anemia  6  Left total shoulder replacement:  03/01/2022       Plan:  Patient has been admitted to the hospitalist service  1  Continue IV diuresis with Lasix 40 mg IV b i d  With close monitoring of I&O, daily weights and electrolytes  2  Will obtain venous duplex of left upper extremity to evaluate for DVT secondary to left hand swelling  3  Patient's Eliquis was resumed today at 5 mg b i d     4  Patient was not discharged to rehab with diuretics, will re-evaluate prior to discharge     History of Present Illness   Physician Requesting Consult: Yordy Pichardo MD  Reason for Consult / Principal Problem:  Volume overload    HPI: Aleyda Lovett is a 73y o  year old female who presented to the emergency room from [facility] care at [facility] with complaints of shortness breath and chest tightness  She has been there for rehab after having a left total reverse shoulder replacement  Chest x-ray was concerning for volume overload and she did receive Lasix 40 mg IV x2 with a very good response  She notes improvement in her breathing today  Her only complaint at this time is of operative pain and also edema of her left hand  Patient was noted to be in atrial fibrillation with rate of 127 beats per minute on arrival this in the emergency room  After receiving Lasix and beginning to diurese heart rate improved and she is now in sinus rhythm  Of note patient has not been on her Eliquis since March 1st 1 was held for her surgery  It was not started immediately postop because of anemia      Past medical history includes paroxysmal atrial fibrillation, SVT, diabetes, chronic kidney disease, hypertension, dyslipidemia, asthma/COPD  Inpatient consult to Cardiology  Consult performed by: RK Chavez  Consult ordered by: Lupe Spencer MD          Review of Systems   Constitutional: Positive for activity change  Negative for appetite change and fever  HENT: Negative  Negative for congestion, facial swelling, sinus pain and tinnitus  Eyes: Negative  Negative for photophobia and visual disturbance  Respiratory: Positive for cough, chest tightness and shortness of breath  Cardiovascular: Positive for leg swelling  Gastrointestinal: Negative  Negative for abdominal distention, diarrhea, nausea and vomiting  Endocrine: Negative  Negative for polydipsia, polyphagia and polyuria  Genitourinary: Negative  Negative for difficulty urinating  Neurological: Negative  Negative for dizziness, syncope, weakness and headaches  Hematological: Negative  Psychiatric/Behavioral: Negative          Historical Information   Past Medical History:   Diagnosis Date    [facility]     Anemia     Asthma     Chronic kidney disease     CKD (chronic kidney disease)     Diabetes mellitus (HCC)     GERD (gastroesophageal reflux disease)     Hyperlipidemia     Hypertension     Kidney stones     PONV (postoperative nausea and vomiting)     SVT (supraventricular tachycardia) (HCC)      Past Surgical History:   Procedure Laterality Date    APPENDECTOMY      CYSTOSCOPY W/ LASER LITHOTRIPSY Left 7/12/2016    Procedure: CYSTOSCOPY URETEROSCOPY WITH LITHOTRIPSY HOLMIUM LASER, RETROGRADE PYELOGRAM AND INSERTION STENT URETERAL;  Surgeon: Francis Castillo MD;  Location: [address] [place] [address];  Service:     DILATION AND CURETTAGE OF UTERUS      JOINT REPLACEMENT      right knee    KNEE ARTHROPLASTY Right     HI CYSTOURETHROSCOPY,URETER CATHETER Left 6/29/2016    Procedure: CYSTOSCOPY RETROGRADE PYELOGRAM WITH INSERTION STENT URETERAL, left;  Surgeon: Francis Castillo MD;  Location: [address] [place] [address];  Service: Urology    IN RECONSTR TOTAL SHOULDER IMPLANT Left 3/1/2022    Procedure: ARTHROPLASTY SHOULDER REVERSE;  Surgeon: Aby Rhoades MD;  Location: [facility];  Service: Orthopedics    SHOULDER ARTHROTOMY Left      Social History     Substance and Sexual Activity   Alcohol Use Not Currently    Comment: rarely     Social History     Substance and Sexual Activity   Drug Use No     E-Cigarette/Vaping    E-Cigarette Use Never User      E-Cigarette/Vaping Substances     Social History     Tobacco Use   Smoking Status Former Smoker    Packs/day: 1 00    Years: 20 00    Pack years: 20 00    Types: Cigarettes    Quit date: 1996    Years since quittin 6   Smokeless Tobacco Never Used     Family History:   Family History   Problem Relation Age of Onset    Heart disease Mother     Emphysema Maternal Grandmother     Heart disease Family        Meds/Allergies   all current active meds have been reviewed, current meds:   Current Facility-Administered Medications   Medication Dose Route Frequency    albuterol (PROVENTIL HFA,VENTOLIN HFA) inhaler 2 puff  2 puff Inhalation Q6H PRN    aluminum-magnesium hydroxide-simethicone (MYLANTA) oral suspension 30 mL  30 mL Oral Q6H PRN    amLODIPine (NORVASC) tablet 5 mg  5 mg Oral Daily    ammonium lactate (LAC-HYDRIN) 12 % lotion   Topical BID    apixaban (ELIQUIS) tablet 5 mg  5 mg Oral BID    ferric gluconate (FERRLECIT) injection 125 mg  125 mg Intravenous Daily    furosemide (LASIX) injection 40 mg  40 mg Intravenous BID (diuretic)    insulin glargine (LANTUS) subcutaneous injection 20 Units 0 2 mL  20 Units Subcutaneous HS    insulin lispro (HumaLOG) 100 units/mL subcutaneous injection 1-5 Units  1-5 Units Subcutaneous TID AC    insulin lispro (HumaLOG) 100 units/mL subcutaneous injection 1-5 Units  1-5 Units Subcutaneous HS    lidocaine (LIDODERM) 5 % patch 2 patch  2 patch Topical Daily    lisinopril (ZESTRIL) tablet 5 mg  5 mg Oral Daily    loratadine (CLARITIN) tablet 10 mg  10 mg Oral Daily    magnesium hydroxide (MILK OF MAGNESIA) oral suspension 30 mL  30 mL Oral Daily PRN    metoprolol (LOPRESSOR) injection 5 mg  5 mg Intravenous Q6H PRN    metoprolol tartrate (LOPRESSOR) tablet 50 mg  50 mg Oral Q12H SELMA    montelukast (SINGULAIR) tablet 10 mg  10 mg Oral HS    nitroglycerin (NITROSTAT) SL tablet 0 4 mg  0 4 mg Sublingual Q5 Min PRN    nystatin (MYCOSTATIN) powder   Topical BID    ondansetron (ZOFRAN) injection 4 mg  4 mg Intravenous Q6H PRN    pravastatin (PRAVACHOL) tablet 80 mg  80 mg Oral Daily With Dinner    pregabalin (LYRICA) capsule 100 mg  100 mg Oral BID    and PTA meds:   Prior to Admission Medications   Prescriptions Last Dose Informant Patient Reported? Taking?    Brianna Galvin 30G X 8 MM [facility] (Specify) Yes No   Trulicity 1 5 QZ/3 2DV SOPN  Outside Facility (Specify) Yes No   Sig: inject 0 5 milliliter subcutaneously every week 28   acetaminophen (TYLENOL) 325 mg tablet  Outside Facility (Specify) Yes No   Sig: Take 650 mg by mouth every 6 (six) hours as needed for mild pain   Patient not taking: Reported on 2/24/2022    albuterol (PROVENTIL HFA,VENTOLIN HFA) 90 mcg/act inhaler 3/8/2022 at Unknown time Outside Facility (Specify) Yes Yes   Sig: Inhale 2 puffs every 6 (six) hours as needed for wheezing   amLODIPine (NORVASC) 5 mg tablet 3/8/2022 at Unknown time Outside Facility (Specify) No Yes   Sig: Take 1 tablet (5 mg total) by mouth daily   ammonium lactate (LAC-HYDRIN) 12 % lotion 3/8/2022 at Unknown time Outside Facility (Specify) Yes Yes   Sig: APPLY TOPICALLY TO AFFECTED AREAS twice a day   apixaban (ELIQUIS) 5 mg Not Taking at Unknown time Outside Facility (Specify) No No   Sig: Take 1 tablet (5 mg total) by mouth 2 (two) times a day   Patient not taking: Reported on 3/9/2022    cephalexin (KEFLEX) 500 mg capsule 3/8/2022 at Unknown time  No Yes   Sig: Take 1 capsule (500 mg total) by mouth 2 (two) times a day for 5 days   desloratadine (CLARINEX) 5 MG tablet  Outside Facility (Specify) Yes No   Sig: Take by mouth daily     insulin glargine (LANTUS) 100 units/mL subcutaneous injection 3/8/2022 at Unknown time  No Yes   Sig: Inject 20 Units under the skin daily at bedtime   lidocaine (LIDODERM) 5 % Not Taking at Unknown time Outside Facility (Specify) No No   Sig: Apply 2 patches topically daily Remove & Discard patch within 12 hours or as directed by MD   Patient not taking: Reported on 3/9/2022    metoprolol tartrate (LOPRESSOR) 25 mg tablet 3/8/2022 at Unknown time Outside Facility (Specify) No Yes   Sig: Take 2 tablets (50 mg total) by mouth every 12 (twelve) hours   montelukast (SINGULAIR) 10 mg tablet 3/8/2022 at Unknown time Outside Facility ([address] [place] [address]) Yes Yes   Sig: Take 10 mg by mouth daily     nystatin (MYCOSTATIN) powder 3/8/2022 at Unknown time Outside Facility ([address] [place] [address]) Yes Yes   Sig: Apply topically 2 (two) times a day   ondansetron (Zofran ODT) 4 mg disintegrating tablet Not Taking at Unknown time Outside Facility (Specify) No No   Sig: Take 1 tablet (4 mg total) by mouth every 6 (six) hours as needed for nausea or vomiting   Patient not taking: Reported on 2/24/2022    polyethylene glycol (MIRALAX) 17 g packet 3/8/2022 at Unknown time  No Yes   Sig: Take 17 g by mouth daily   pregabalin (LYRICA) 100 mg capsule 3/8/2022 at Unknown time Outside Facility ([address] [place] [address]) Yes Yes   Sig: Take 100 mg by mouth 2 (two) times a day    rosuvastatin (CRESTOR) 10 MG tablet 3/8/2022 at Unknown time Outside Facility ([address] [place] [address]) Yes Yes   Sig: 10 mg daily        Facility-Administered Medications: None     Allergies   Allergen Reactions    Penicillins Hives    Moxifloxacin Other (See Comments)     unknown    Percocet [Oxycodone-Acetaminophen] Other (See Comments)     unknown    Zinc Acetate Other (See Comments)     unknown    Nuts - Food Allergy Other (See Comments)    Asa [Aspirin] GI Intolerance    Indocin [Indomethacin] Other (See Comments)     Made patient "loopy"    Other Other (See Comments)     unknown       Objective   Vitals: Blood pressure 118/74, pulse 90, temperature 97 8 °F (36 6 °C), resp  rate 19, height 5' 2" (1 575 m), weight 122 kg (269 lb 13 5 oz), SpO2 92 %, not currently breastfeeding  Orthostatic Blood Pressures      Most Recent Value   Blood Pressure 118/74 filed at 03/10/2022 2099   Patient Position - Orthostatic VS Lying filed at 03/09/2022 2015            Intake/Output Summary (Last 24 hours) at 3/10/2022 0913  Last data filed at 3/10/2022 0701  Gross per 24 hour   Intake --   Output 1400 ml   Net -1400 ml       Invasive Devices  Report    Peripheral Intravenous Line            Peripheral IV 03/09/22 Right;Ventral (anterior) Forearm <1 day                Physical Exam  Vitals and nursing note reviewed  Constitutional:       Appearance: Normal appearance  She is morbidly obese  HENT:      Head: Normocephalic  Right Ear: External ear normal       Left Ear: External ear normal    Eyes:      General: No scleral icterus  Right eye: No discharge  Left eye: No discharge  Cardiovascular:      Rate and Rhythm: Normal rate and regular rhythm  Pulses: Normal pulses  Heart sounds: Heart sounds are distant  Pulmonary:      Effort: No accessory muscle usage or respiratory distress  Breath sounds: Examination of the right-lower field reveals decreased breath sounds and rales  Examination of the left-lower field reveals decreased breath sounds and rales  Decreased breath sounds and rales present  Abdominal:      General: Bowel sounds are normal  There is no distension  Palpations: Abdomen is soft  Musculoskeletal:      Right lower leg: Edema present  Skin:     Capillary Refill: Capillary refill takes less than 2 seconds  Neurological:      General: No focal deficit present  Mental Status: She is alert and oriented to person, place, and time   Mental status is at baseline  Psychiatric:         Mood and Affect: Mood normal          Lab Results:   I have personally reviewed pertinent lab results  CBC with diff:   Results from last 7 days   Lab Units 03/10/22  0513   WBC Thousand/uL 6 19   RBC Million/uL 3 13*   HEMOGLOBIN g/dL 8 9*   HEMATOCRIT % 30 9*   MCV fL 99*   MCH pg 28 4   MCHC g/dL 28 8*   RDW % 15 7*   MPV fL 9 9   PLATELETS Thousands/uL 300     CMP:   Results from last 7 days   Lab Units 03/10/22  0513   SODIUM mmol/L 146*   CHLORIDE mmol/L 107   CO2 mmol/L 32   BUN mg/dL 30*   CREATININE mg/dL 1 24   CALCIUM mg/dL 8 2*   AST U/L 14   ALT U/L 18   ALK PHOS U/L 79   EGFR ml/min/1 73sq m 44     HS Troponin:   0   Lab Value Date/Time    HSTNI0 7 03/09/2022 1830    HSTNI2 7 03/09/2022 2040    HSTNI4 7 03/09/2022 2228     BNP:   Results from last 7 days   Lab Units 03/10/22  0513   POTASSIUM mmol/L 4 2   CHLORIDE mmol/L 107   CO2 mmol/L 32   BUN mg/dL 30*   CREATININE mg/dL 1 24   CALCIUM mg/dL 8 2*   EGFR ml/min/1 73sq m 44     Coags:   Results from last 7 days   Lab Units 03/10/22  0513   PTT seconds 40*   INR  1 34*     TSH:   Results from last 7 days   Lab Units 03/09/22  1830   TSH 3RD GENERATON uIU/mL 3 550     Magnesium:   Results from last 7 days   Lab Units 03/10/22  0513   MAGNESIUM mg/dL 2 1     Lipid Profile:   Results from last 7 days   Lab Units 03/10/22  0513   HDL mg/dL 30*   LDL CALC mg/dL 48   TRIGLYCERIDES mg/dL 132     Imaging: I have personally reviewed pertinent reports      EKG:  Initial 12 lead EKG demonstrated atrial fibrillation at a rate of 127, as rate control improved and she was diuresed she converted back to sinus rhythm  VTE Prophylaxis: Sequential compression device Melvena Barley)     Code Status: Level 1 - Full Code  Advance Directive and Living Will:      Power of :    POLST:      Scooter Bolanos, [address]  Cardiology

## 2022-03-10 NOTE — ASSESSMENT & PLAN NOTE
Wt Readings from Last 3 Encounters:   03/10/22 122 kg (269 lb)   03/08/22 135 kg (297 lb 9 9 oz)   03/03/22 127 kg (279 lb 15 8 oz)     - SOB POA, secondary to acute on chronic diastolic CHF  BNP 5992   CT shows small b/l plerual effusions   - recent shoulder surgery, SEBASTIAN, not on diuretics at SNF (previously on torsemide 10mg/d)  - follow up repeat Echo - previously LVEF 55%  - pro calcitonin negative - would hold off abx for aspiration related air space opacities seen on CT    - CTA chest negative for pulmonary embolism   - continue Lasix IV 40mg BID, metoprolol  - daily weight, I/O, CHF diet   - cardiology evaluation appreciated

## 2022-03-10 NOTE — ASSESSMENT & PLAN NOTE
- On arrival rapid a fib with RVR responded to IV lopressor - continue 5 mg IV prn   - continue metoprolol tartrate 50 mg PO BID at home   - DEMETRIS Vasc score 3   - Dr Michael Gerber cleared to resume Eliquis   - as per cardiology note for pre op clearance 3/1/22:  Patient typically has episodes of paroxysmal SVT & a fib was only in past - as per note - if no recurrence then consideration for dc Eliquis - cardiology consulted    - tele monitoring

## 2022-03-10 NOTE — CONSULTS
Chief Complaint     SOB      History of the Present Illness     Vanda De Jesus is a 73 y o  female s/p left reverse TSA for fracture performed on 03/01/22 by Dr Hipoliot Neal  She had presented to the ED last night with c/o SOB and CP and found to have acute on chronic diastolic CHF with her CTA being negative for PE  She states overall the shoulder is doing well, no major complaints  She denies n/t  She states she actually feels much more comfortable now that the pillow has been removed from her sling by Dr Hipolito Neal earlier today            Past Medical History:   Diagnosis Date    A-fib ([facility] [address] )     Anemia     Asthma     Chronic kidney disease     CKD (chronic kidney disease)     Diabetes mellitus (HCC)     GERD (gastroesophageal reflux disease)     Hyperlipidemia     Hypertension     Kidney stones     PONV (postoperative nausea and vomiting)     SVT [facility]supra[facility]ricular tachycardia) ([facility])        Past Surgical History:   Procedure Laterality Date    APPENDECTOMY      CYSTOSCOPY W/ LASER LITHOTRIPSY Left 7/12/2016    Procedure: CYSTOSCOPY URETEROSCOPY WITH LITHOTRIPSY HOLMIUM LASER, RETROGRADE PYELOGRAM AND INSERTION STENT URETERAL;  Surgeon: Lisa Rivera MD;  Location: [address] [place] [address];  Service:     DILATION AND CURETTAGE OF UTERUS      JOINT REPLACEMENT      right knee    KNEE ARTHROPLASTY Right     MI CYSTOURETHROSCOPY,URETER CATHETER Left 6/29/2016    Procedure: CYSTOSCOPY RETROGRADE PYELOGRAM WITH INSERTION STENT URETERAL, left;  Surgeon: Lisa Rivera MD;  Location: [address] [place] [address];  Service: Urology    MI RECONSTR TOTAL SHOULDER IMPLANT Left 3/1/2022    Procedure: ARTHROPLASTY SHOULDER REVERSE;  Surgeon: Ally Malloy MD;  Location: [facility] OR;  Service: Orthopedics    SHOULDER ARTHROTOMY Left        Allergies   Allergen Reactions    Penicillins Hives    Moxifloxacin Other (See Comments)     unknown    Percocet [Oxycodone-Acetaminophen] Other (See Comments)     unknown    Zinc Acetate Other (See Comments)     unknown    Nuts - Food Allergy Other (See Comments)    Asa [Aspirin] GI Intolerance    Indocin [Indomethacin] Other (See Comments)     Made patient "loopy"    Other Other (See Comments)     unknown       No current facility-administered medications on file prior to encounter       Current Outpatient Medications on File Prior to Encounter   Medication Sig Dispense Refill    albuterol (PROVENTIL HFA,VENTOLIN HFA) 90 mcg/act inhaler Inhale 2 puffs every 6 (six) hours as needed for wheezing      amLODIPine (NORVASC) 5 mg tablet Take 1 tablet (5 mg total) by mouth daily 30 tablet 0    ammonium lactate (LAC-HYDRIN) 12 % lotion APPLY TOPICALLY TO AFFECTED AREAS twice a day      cephalexin (KEFLEX) 500 mg capsule Take 1 capsule (500 mg total) by mouth 2 (two) times a day for 5 days 10 capsule 0    insulin glargine (LANTUS) 100 units/mL subcutaneous injection Inject 20 Units under the skin daily at bedtime 10 mL 0    metoprolol tartrate (LOPRESSOR) 25 mg tablet Take 2 tablets (50 mg total) by mouth every 12 (twelve) hours 180 tablet 3    montelukast (SINGULAIR) 10 mg tablet Take 10 mg by mouth daily        nystatin (MYCOSTATIN) powder Apply topically 2 (two) times a day      polyethylene glycol (MIRALAX) 17 g packet Take 17 g by mouth daily  0    pregabalin (LYRICA) 100 mg capsule Take 100 mg by mouth 2 (two) times a day       rosuvastatin (CRESTOR) 10 MG tablet 10 mg daily    0    acetaminophen (TYLENOL) 325 mg tablet Take 650 mg by mouth every 6 (six) hours as needed for mild pain (Patient not taking: Reported on 2/24/2022 )      apixaban (ELIQUIS) 5 mg Take 1 tablet (5 mg total) by mouth 2 (two) times a day (Patient not taking: Reported on 3/9/2022 ) 60 tablet 0    desloratadine (CLARINEX) 5 MG tablet Take by mouth daily        lidocaine (LIDODERM) 5 % Apply 2 patches topically daily Remove & Discard patch within 12 hours or as directed by MD (Patient not taking: Reported on 3/9/2022 )  0    NOVOFINE AUTOCOVER 30G X 8 MM MISC   0    ondansetron (Zofran ODT) 4 mg disintegrating tablet Take 1 tablet (4 mg total) by mouth every 6 (six) hours as needed for nausea or vomiting (Patient not taking: Reported on 2022 ) 20 tablet 0    Trulicity 1 5 ES/5 2YY SOPN inject 0 5 milliliter subcutaneously every week 28         Social History     Tobacco Use    Smoking status: Former Smoker     Packs/day: 1 00     Years: 20 00     Pack years: 20 00     Types: Cigarettes     Quit date: 1996     Years since quittin 6    Smokeless tobacco: Never Used   Vaping Use    Vaping Use: Never used   Substance Use Topics    Alcohol use: Not Currently     Comment: rarely    Drug use: No       Family History   Problem Relation Age of Onset    Heart disease Mother     Emphysema Maternal Grandmother     Heart disease Family        Review of Systems     As stated in the HPI  All other systems were reviewed and are negative  Physical Exam     /75   Pulse 73   Temp 98 2 °F (36 8 °C)   Resp 19   Ht 5' 2" (1 575 m)   Wt 122 kg (269 lb)   SpO2 98%   BMI 49 20 kg/m²     GENERAL: This is a well-developed, well-nourished, age-appropriate patient in no acute distress  The patient is alert and oriented x3  Pleasant and cooperative  Eyes: Anicteric sclerae  Extraocular movements appear intact  HENT: Nares are patent with no drainage  Lungs: There is equal chest rise on inspection  Breathing is non-labored with no audible wheezing  Cardiovascular: No cyanosis  No upper extremity lymphadema  Skin: Skin is warm to touch  No obvious skin lesions or rashes other than described below  Neurologic: No ataxia  Psychiatric: Mood and affect are appropriate  Left Upper Extremity:  Sling in place  Incision is c/d/i  No erythema, induration, fluctuance  Patient with some ecchymosis proximal to elbow  Compartments are soft  S/M intact to m/r/u nerve distributions    Brisk capillary refill  Data Review     Results Reviewed     Procedure Component Value Units Date/Time    Blood culture #1 [[id number]] Collected: 03/09/22 2106    Lab Status: Preliminary result Specimen: Blood from Hand, Left Updated: 03/10/22 1014     Blood Culture Received in Microbiology Lab  Culture in Progress  Blood culture #2 [[id number]] Collected: 03/09/22 2126    Lab Status: Preliminary result Specimen: Blood from Arm, Left Updated: 03/10/22 1014     Blood Culture Received in Microbiology Lab  Culture in Progress      Vitamin B12 [[id number]]  (Normal) Collected: 03/09/22 1830    Lab Status: Final result Specimen: Blood from Arm, Right Updated: 03/10/22 0955     Vitamin B-12 338 pg/mL     Folate [[id number]]  (Normal) Collected: 03/09/22 1830    Lab Status: Final result Specimen: Blood from Arm, Right Updated: 03/10/22 0955     Folate 17 1 ng/mL     Iron Saturation % [[id number]]  (Abnormal) Collected: 03/09/22 1830    Lab Status: Final result Specimen: Blood from Arm, Right Updated: 03/10/22 0955     Iron Saturation 16 %      TIBC 198 ug/dL      Iron 32 ug/dL     Ferritin [[id number]]  (Normal) Collected: 03/09/22 1830    Lab Status: Final result Specimen: Blood from Arm, Right Updated: 03/10/22 0955     Ferritin 237 ng/mL     Hemoglobin A1C [[id number]]  (Abnormal) Collected: 03/09/22 1830    Lab Status: Final result Specimen: Blood Updated: 03/10/22 0947     Hemoglobin A1C 6 8 %       mg/dl     APTT [[id number]]  (Abnormal) Collected: 03/10/22 0513    Lab Status: Final result Specimen: Blood from Arm, Right Updated: 03/10/22 0641     PTT 40 seconds     Protime-INR [[id number]]  (Abnormal) Collected: 03/10/22 0513    Lab Status: Final result Specimen: Blood from Arm, Right Updated: 03/10/22 0641     Protime 16 2 seconds      INR 1 34    Comprehensive metabolic panel [[id number]]  (Abnormal) Collected: 03/10/22 0513    Lab Status: Final result Specimen: Blood from Arm, Right Updated: 03/10/22 6304     Sodium 146 mmol/L      Potassium 4 2 mmol/L      Chloride 107 mmol/L      CO2 32 mmol/L      ANION GAP 7 mmol/L      BUN 30 mg/dL      Creatinine 1 24 mg/dL      Glucose 130 mg/dL      Calcium 8 2 mg/dL      Corrected Calcium 9 4 mg/dL      AST 14 U/L      ALT 18 U/L      Alkaline Phosphatase 79 U/L      Total Protein 6 3 g/dL      Albumin 2 5 g/dL      Total Bilirubin 0 32 mg/dL      eGFR 44 ml/min/1 73sq m     Narrative:      [place] guidelines for Chronic Kidney Disease (CKD):     Stage 1 with normal or high GFR (GFR > 90 mL/min/1 73 square meters)    Stage 2 Mild CKD (GFR = 60-89 mL/min/1 73 square meters)    Stage 3A Moderate CKD (GFR = 45-59 mL/min/1 73 square meters)    Stage 3B Moderate CKD (GFR = 30-44 mL/min/1 73 square meters)    Stage 4 Severe CKD (GFR = 15-29 mL/min/1 73 square meters)    Stage 5 End Stage CKD (GFR <15 mL/min/1 73 square meters)  Note: GFR calculation is accurate only with a steady state creatinine    Phosphorus [[id number]]  (Normal) Collected: 03/10/22 0513    Lab Status: Final result Specimen: Blood from Arm, Right Updated: 03/10/22 2629     Phosphorus 3 8 mg/dL     Lipid Panel with Direct LDL reflex [[id number]]  (Abnormal) Collected: 03/10/22 0513    Lab Status: Final result Specimen: Blood from Arm, Right Updated: 03/10/22 6311     Cholesterol 104 mg/dL      Triglycerides 132 mg/dL      HDL, Direct 30 mg/dL      LDL Calculated 48 mg/dL     Magnesium [[id number]]  (Normal) Collected: 03/10/22 0513    Lab Status: Final result Specimen: Blood from Arm, Right Updated: 03/10/22 0638     Magnesium 2 1 mg/dL     CBC and differential [[id number]]  (Abnormal) Collected: 03/10/22 0513    Lab Status: Final result Specimen: Blood from Arm, Right Updated: 03/10/22 0610     WBC 6 19 Thousand/uL      RBC 3 13 Million/uL      Hemoglobin 8 9 g/dL      Hematocrit 30 9 %      MCV 99 fL      MCH 28 4 pg      MCHC 28 8 g/dL      RDW 15 7 %      MPV 9 9 fL      Platelets 067 Thousands/uL      nRBC 0 /100 WBCs      Neutrophils Relative 54 %      Immat GRANS % 1 %      Lymphocytes Relative 25 %      Monocytes Relative 15 %      Eosinophils Relative 4 %      Basophils Relative 1 %      Neutrophils Absolute 3 42 Thousands/µL      Immature Grans Absolute 0 03 Thousand/uL      Lymphocytes Absolute 1 54 Thousands/µL      Monocytes Absolute 0 90 Thousand/µL      Eosinophils Absolute 0 22 Thousand/µL      Basophils Absolute 0 08 Thousands/µL     HS Troponin I 4hr [[id number]]  (Normal) Collected: 03/09/22 2228    Lab Status: Final result Specimen: Blood from Arm, Right Updated: 03/09/22 2302     hs TnI 4hr 7 ng/L      Delta 4hr hsTnI 0 ng/L     Retic Count [[id number]]  (Abnormal) Collected: 03/09/22 1830    Lab Status: Final result Specimen: Blood from Arm, Right Updated: 03/09/22 2236     Retic Ct Abs 129,700     Retic Ct Pct 3 98 %     TSH, 3rd generation with Free T4 reflex [[id number]]  (Normal) Collected: 03/09/22 1830    Lab Status: Final result Specimen: Blood from Arm, Right Updated: 03/09/22 2221     TSH 3RD GENERATON 3 550 uIU/mL     Narrative:      Patients undergoing fluorescein dye angiography may retain small amounts of fluorescein in the body for 48-72 hours post procedure  Samples containing fluorescein can produce falsely depressed TSH values  If the patient had this procedure,a specimen should be resubmitted post fluorescein clearance        Procalcitonin with AM Reflex [[id number]]  (Normal) Collected: 03/09/22 2106    Lab Status: Final result Specimen: Blood from Arm, Left Updated: 03/09/22 2211     Procalcitonin 0 08 ng/ml     HS Troponin I 2hr [[id number]]  (Normal) Collected: 03/09/22 2040    Lab Status: Final result Specimen: Blood from Arm, Right Updated: 03/09/22 2121     hs TnI 2hr 7 ng/L      Delta 2hr hsTnI 0 ng/L     COVID/FLU/RSV - 2 hour TAT [[id number]]  (Normal) Collected: 03/09/22 1830    Lab Status: Final result Specimen: Nares from Nose Updated: 03/09/22 1935 SARS-CoV-2 Negative     INFLUENZA A PCR Negative     INFLUENZA B PCR Negative     RSV PCR Negative    Narrative:      FOR PEDIATRIC PATIENTS - copy/paste COVID Guidelines URL to browser: [email or web address]    SARS-CoV-2 assay is a Nucleic Acid Amplification assay intended for the  qualitative detection of nucleic acid from SARS-CoV-2 in nasopharyngeal  swabs  Results are for the presumptive identification of SARS-CoV-2 RNA  Positive results are indicative of infection with SARS-CoV-2, the virus  causing COVID-19, but do not rule out bacterial infection or co-infection  with other viruses  Laboratories within the [place] and its  territories are required to report all positive results to the appropriate  public health authorities  Negative results do not preclude SARS-CoV-2  infection and should not be used as the sole basis for treatment or other  patient management decisions  Negative results must be combined with  clinical observations, patient history, and epidemiological information  This test has not been FDA cleared or approved  This test has been authorized by FDA under an Emergency Use Authorization  (EUA)  This test is only authorized for the duration of time the  declaration that circumstances exist justifying the authorization of the  emergency use of an in vitro diagnostic tests for detection of SARS-CoV-2  virus and/or diagnosis of COVID-19 infection under section 564(b)(1) of  the Act, 21 U  S C  191FJC-1(V)(3), unless the authorization is terminated  or revoked sooner  The test has been validated but independent review by FDA  and CLIA is pending  Test performed using [facility] GeneXpert: This RT-PCR assay targets N2,  a region unique to SARS-CoV-2  A conserved region in the E-gene was chosen  for pan-Sarbecovirus detection which includes SARS-CoV-2      HS Troponin 0hr (reflex protocol) [[id number]]  (Normal) Collected: 03/09/22 6068 Lab Status: Final result Specimen: Blood from Arm, Right Updated: 03/09/22 1921     hs TnI 0hr 7 ng/L     Protime-INR [[id number]]  (Abnormal) Collected: 03/09/22 1830    Lab Status: Final result Specimen: Blood from Arm, Right Updated: 03/09/22 1915     Protime 15 2 seconds      INR 1 22    APTT [[id number]]  (Normal) Collected: 03/09/22 1830    Lab Status: Final result Specimen: Blood from Arm, Right Updated: 03/09/22 1915     PTT 37 seconds     NT-BNP PRO [[id number]]  (Abnormal) Collected: 03/09/22 1830    Lab Status: Final result Specimen: Blood from Arm, Right Updated: 03/09/22 1906     NT-proBNP 5,992 pg/mL     Comprehensive metabolic panel [[id number]]  (Abnormal) Collected: 03/09/22 1830    Lab Status: Final result Specimen: Blood from Arm, Right Updated: 03/09/22 1900     Sodium 146 mmol/L      Potassium 4 6 mmol/L      Chloride 108 mmol/L      CO2 31 mmol/L      ANION GAP 7 mmol/L      BUN 32 mg/dL      Creatinine 1 14 mg/dL      Glucose 149 mg/dL      Calcium 8 4 mg/dL      Corrected Calcium 9 7 mg/dL      AST 13 U/L      ALT 17 U/L      Alkaline Phosphatase 84 U/L      Total Protein 6 2 g/dL      Albumin 2 4 g/dL      Total Bilirubin 0 41 mg/dL      eGFR 49 ml/min/1 73sq m     Narrative:      [place] guidelines for Chronic Kidney Disease (CKD):     Stage 1 with normal or high GFR (GFR > 90 mL/min/1 73 square meters)    Stage 2 Mild CKD (GFR = 60-89 mL/min/1 73 square meters)    Stage 3A Moderate CKD (GFR = 45-59 mL/min/1 73 square meters)    Stage 3B Moderate CKD (GFR = 30-44 mL/min/1 73 square meters)    Stage 4 Severe CKD (GFR = 15-29 mL/min/1 73 square meters)    Stage 5 End Stage CKD (GFR <15 mL/min/1 73 square meters)  Note: GFR calculation is accurate only with a steady state creatinine    Magnesium [[id number]]  (Normal) Collected: 03/09/22 1830    Lab Status: Final result Specimen: Blood from Arm, Right Updated: 03/09/22 1856     Magnesium 2 3 mg/dL     CBC and differential [[id number]]  (Abnormal) Collected: 03/09/22 1830    Lab Status: Final result Specimen: Blood from Arm, Right Updated: 03/09/22 1842     WBC 6 19 Thousand/uL      RBC 3 29 Million/uL      Hemoglobin 9 2 g/dL      Hematocrit 31 7 %      MCV 96 fL      MCH 28 0 pg      MCHC 29 0 g/dL      RDW 15 5 %      MPV 10 0 fL      Platelets 061 Thousands/uL      nRBC 0 /100 WBCs      Neutrophils Relative 56 %      Immat GRANS % 1 %      Lymphocytes Relative 24 %      Monocytes Relative 13 %      Eosinophils Relative 4 %      Basophils Relative 2 %      Neutrophils Absolute 3 57 Thousands/µL      Immature Grans Absolute 0 03 Thousand/uL      Lymphocytes Absolute 1 46 Thousands/µL      Monocytes Absolute 0 80 Thousand/µL      Eosinophils Absolute 0 24 Thousand/µL      Basophils Absolute 0 09 Thousands/µL              Imaging:  Chest xrays were reviewed which demonstrate part of patient's left rTSA (nearly full implant in one film)  These demonstrate good alignment of the implant without hardware failure  Assessment and Plan      73 y o  female s/p left reverse TSA for fracture performed on 03/01/22  Dr Hipolito Neal did see the patient and review her chest XRs and, at this time, she is doing well from an orthopedic perspective  Patient should remain in her sling for a total of 6 weeks  We will have her follow up as an outpatient in 1 week for reevaluation

## 2022-03-10 NOTE — CASE MANAGEMENT
Case Management Assessment & Discharge Planning Note    Patient name Vanda De Jesus  Location [address] 401/4 Joss 12-* MRN [id number]  : 1950 Date 3/10/2022       Current Admission Date: 3/9/2022  Current Admission Diagnosis:Acute diastolic congestive heart failure [facility])   Patient Active Problem List    Diagnosis Date Noted    Acute diastolic congestive heart failure ([facility] ) 03/10/2022    Shortness of breath 2022    Status post reverse total replacement of left shoulder 2022    Constipation 2022    Closed 4-part fracture of proximal humerus, left, initial encounter 2022    PONV (postoperative nausea and vomiting) 2022    Acute renal failure superimposed on chronic kidney disease ([facility] ) 2022    Diabetes mellitus, type 2 ([place] [address] ) 2022    Gastroesophageal reflux disease 2022    Nephrolithiasis 2022    Arthritis 2022    S/P total knee replacement, right 2022    On continuous oral anticoagulation - eliquis 2022    Humeral head fracture 2022    Essential hypertension 2019    Anemia 2019    Mixed hyperlipidemia 2019    Paroxysmal atrial fibrillation ([facility] ) 2019    Lower leg edema 2019    Asthma 2018    Morbid obesity with BMI of 45 0-49 9, adult ([facility] ) 2018      LOS (days): 1  Geometric Mean LOS (GMLOS) (days): 3 80  Days to GMLOS:2 9     OBJECTIVE:  PATIENT READMITTED [facility]  of Unplanned Readmission Score: 30   Current admission status: Inpatient    Preferred Pharmacy:   [address] [place] [address],[address] , [address] [place] [id number]  Phone: [phone] Fax: [phone]    Primary Care Provider: Ivon Or  Primary Insurance: MEDICARE  Secondary Insurance: ISABEL    ASSESSMENT:  [address], [address] Representative - Relative   Primary Phone: [phone] (Mobile) Advance Directives  Does patient have a [address]?: No  Was patient offered paperwork?: Yes  Does patient currently have a Health Care decision maker?: Yes, please see Health Care Proxy section  Does patient have Advance Directives?: No  Was patient offered paperwork?: Yes    Readmission Root Cause  30 Day Readmission: Yes  Who directed you to return to the hospital?: Other (comment) (STR [facility])  Did you understand whom to contact if you had questions or problems?: Yes (CCB)  Did you get your prescriptions before you left the hospital?: No  Reason[de-identified] Not preferred pharmacy (CCB)  Were you able to get your prescriptions filled when you left the hospital?: Yes (CCB)  Did you take your medications as prescribed?: Yes (CCB)  Were you able to get to your follow-up appointments?: Yes (CCB)  During previous admission, was a post-acute recommendation made?: Yes  What post-acute resources were offered?: STR (Patient went to [facility] [place] last admission)  Patient was readmitted due to: SOB, CHF, LE edema, AF w/RVR; last admission s/p mechanical fall w/fx humerus and subsequent SEBASTIAN  Action Plan: Patient and family education  Patient is going home with her niece at this [place]  Extensive CHF education w/patient and niece Bailey Hong and LELIAA referral made to [facility]  Patient Information  Admitted from[de-identified] Facility  Mental Status: Alert  During Assessment patient was accompanied by: Not accompanied during assessment  Assessment information provided by[de-identified] Patient  Primary Caregiver: Family  Caregiver's Name[de-identified] Alicia Wei Relationship to Patient[de-identified] Family Member (niece)  Caregiver's Telephone Number[de-identified] [phone]  Support Systems: Self,Family members  [place] of Residence: [address] [place] [address] do you live in?: [address] entry access options   Select all that apply : Stairs  Number of steps to enter home : 2  Do the steps have railings?: Yes  Type of Current Residence: 2 story home  Upon entering residence, is there a bedroom on the main floor (no further steps)?: Yes  Upon entering residence, is there a bathroom on the main floor (no further steps)?: Yes  In the last 12 months, was there a time when you were not able to pay the mortgage or rent on time?: No  In the last 12 months, how many places have you lived?: 1  In the last 12 months, was there a time when you did not have a steady place to sleep or slept in a shelter (including now)?: No  Homeless/housing insecurity resource given?: N/A  Living Arrangements: Lives w/ Extended Family (Reynold Welsh (niece) [phone])    Activities of Daily Living Prior to Admission  Functional Status: Assistance  Ambulates independently?: No  Level of ambulatory dependence: Assistance  Does patient use assisted devices?: Yes  Assisted Devices (DME) used: [facility]  Does patient currently own DME?: Yes  What DME does the patient currently own?: Bedside Commode,Walker,Wheelchair,Hospital Bed  Does patient have a history of Outpatient Therapy (PT/OT)?: Yes  Does the patient have a history of Short-Term Rehab?: Yes  Does patient have a history of HHC?: No  Does patient currently have [address]?: No    Patient Information Continued  Income Source: Pension/halfway  Does patient have prescription coverage?: Yes  Within the past 12 months, you worried that your food would run out before you got the money to buy more : Never true  Within the past 12 months, the food you bought just didnt last and you didnt have money to get more : Never true  Food insecurity resource given?: N/A  Does patient have a history of substance abuse?: No  Does patient have a history of Mental Health Diagnosis?: No    Means of Transportation  Means of Transport to Appts[de-identified] Family transport  In the past 12 months, has lack of transportation kept you from medical appointments or from getting medications?: No  In the past 12 months, has lack of transportation kept you from meetings, work, or from getting things needed for daily living?: No  Was application for public transport provided?: N/A    DISCHARGE DETAILS:  Discharge planning discussed with[de-identified] Patient  Freedom of Choice: Yes  Comments - Freedom of Choice: [facility] recommended  Discussed with patient and she would like HHC at dc  She does not want to return to [facility]  She was provided with [address] choice and she says she has no preference  A referral was made to [facility] in [place]    CM contacted family/caregiver?: No- see comments (Bianca Oliva (niece) [phone] was at a  today and unable to discuss-will f/u in am )  Were Treatment Team discharge recommendations reviewed with patient/caregiver?: Yes  Did patient/caregiver verbalize understanding of patient care needs?: Yes  Were patient/caregiver advised of the risks associated with not following Treatment Team discharge recommendations?: Yes    Contacts  Patient Contacts: Bianca Cousin  Relationship to Patient[de-identified] Family (niece)  Contact Method: Phone  Phone Number: [phone]  Reason/Outcome: Emergency [address]         Is the patient interested in [address] at discharge?: Yes  [address] requested[de-identified] [address] Name[de-identified] Hoang Burks Provider[de-identified] PCP  Home Health Services Needed[de-identified] Strengthening/Theraputic Exercises to Improve Function,Heart Failure Management,Gait/ADL Training,Evaluate Functional Status and Safety  Homebound Criteria Met[de-identified] Uses an Assist Device (i e  cane, walker, etc),Requires the Assistance of Another Person for Safe Ambulation or to Leave the Home  Supporting Clincal Findings[de-identified] Fatigues Easliy in Short Distances,Limited Endurance,Dyspnea with Exertion    DME Referral Provided  Referral made for DME?: No    Other Referral/Resources/Interventions Provided:  Interventions: [facility]    Would you like to participate in our [address] service program?  : No - Declined    Treatment Team Recommendation: [facility]  [address]  Discharge Destination Plan[de-identified] Home with [place] at Discharge : Family,Automobile     IMM Given (Date):: 22  IMM Given to[de-identified] Patient (Given to patient on admission  )        CM met with patient and discussed dc planning, available services and the role of CM  Patient is a readmission and was sent to [facility]  She was now readmitted from B  Patient was evaluated by PT today and they are now recommending home with [address]  Patient states she does not want to return to a STR at dc  She states that she her niece Raul Romero has all DME in place at her home and she states she would like to stay with her niece and receive HHC at dc  Patients niece has a hospital bed, wc, bedside commode and walker in place for patient to move in with her  Patient was provided with choice for [address]  She states she has no preference and a referral was sent to [address] [place] [address] in [place]  Patients carter Romero was at a  today therefore unable to be reached  CM to follow up in the am and confirm above dc plan

## 2022-03-10 NOTE — PHYSICAL THERAPY NOTE
PHYSICAL THERAPY EVALUATION/TREATMENT     03/10/22 1500   PT Last Visit   PT Visit Date 03/10/22   Note Type   Note type Evaluation   Pain Assessment   Pain Assessment Tool Guan-Baker FACES   Guan-Baker FACES Pain Rating 6   Pain Location/Orientation Orientation: Left; Location: Shoulder   Restrictions/Precautions   Weight Bearing Precautions Per Order Yes   LUE Weight Bearing Per Order NWB   Braces or Orthoses   (left sling with abductor pillow)   Other Precautions Chair Alarm; Bed Alarm;O2;Fall Risk   Home Living   Type of Home SNF   Prior Function   Level of [place] Needs assistance with IADLs   ADL Assistance Needs assistance   IADLs Needs assistance   General   Additional Pertinent History Pt was living alone, prior to falling and breaking her shoulder  Pt was in rehab, but returned to hospital with CHF  Upon d/c pt has made arrangements to stay at her neice's house ([facility]) who will be her caretaker  She works from home  Pt will have a hospital bed, vin walker and w/c, with 24 hour care  Family/Caregiver Present Yes  (pt's sister and pt's best friend)   Cognition   Overall Cognitive Status WFL   Arousal/Participation Cooperative   Attention Within functional limits   Orientation Level Oriented X4   Following Commands Follows all commands and directions without difficulty   Subjective   Subjective Pt reports feeling so much better than when she left the hospital a few days ago     RLE Assessment   RLE Assessment WFL  (strength at least 3+/5 grossly)   LLE Assessment   LLE Assessment WFL  (strength at least 3+/5 grossly)   Bed Mobility   Additional Comments Pt presents in bedside chair   Transfers   Sit to Stand 4  Minimal assistance   Additional items Verbal cues   Stand to Sit 4  Minimal assistance   Additional items Verbal cues  (1 L via NC)   Ambulation/Elevation   Gait pattern Wide ONUR  (slow steady kameron)   Gait Assistance 4  Minimal assist   Additional items Assist x 1;Verbal cues Assistive Device None  (O2 at 1L)   Distance 10 feet   Balance   Static Sitting Fair +   Dynamic Sitting Fair   Static Standing Fair   Dynamic Standing Fair   Ambulatory Fair   Activity Tolerance   Activity Tolerance Patient limited by pain; Patient limited by fatigue   Nurse Made Aware yes   Assessment   Prognosis Good   Problem List Decreased strength;Decreased endurance; Impaired balance;Decreased mobility;Obesity; Decreased skin integrity;Orthopedic restrictions;Pain   Assessment Patient seen for Physical Therapy evaluation  Patient admitted with Acute diastolic congestive heart failure ([address] )  Comorbidities affecting patient's physical performance include: s/p left reverse TSA on 3/1/22, A fib, anemia, CKD, DM, HTN  Personal factors affecting patient at time of initial evaluation include: lives in single story house, stairs to enter home, inability to ambulate household distances, positive fall history, inability to perform physical activity, inability to perform ADLS and inability to perform IADLS   Prior to admission, patient was independent with functional mobility without assistive device, independent with ADLS, independent with IADLS, living alone in single story home with 2  steps to enter and ambulating household distance  Please find objective findings from Physical Therapy assessment regarding body systems outlined above with impairments and limitations including weakness, impaired balance, decreased endurance, impaired coordination, gait deviations, pain, decreased activity tolerance, decreased functional mobility tolerance, fall risk, orthopedic restrictions and decreased skin integrity  The Barthel Index was used as a functional outcome tool presenting with a score of Barthel Index Score: 50 today indicating marked limitations of functional mobility and ADLS    Patient's clinical presentation is currently unstable/unpredictable as seen in patient's presentation of vital sign response, changing level of pain, increased fall risk, new onset of impairment of functional mobility and decreased endurance  Pt would benefit from continued Physical Therapy treatment to address deficits as defined above and maximize level of functional mobility  As demonstrated by objective findings, the assigned level of complexity for this evaluation is high  The patient's AM-PAC Basic Mobility Inpatient Short Form Raw Score is 15  A Raw score of less than or equal to 16 suggests the patient may benefit from discharge to post-acute rehabilitation services, however expect pt to progress with physical therapy during hospital course to allow pt to return to carter's home with 24 hour care  Please also refer to the recommendation of the Physical Therapist for safe discharge planning  Goals   Patient Goals to not go back to rehab: wants to go to her nicole's house   STG Expiration Date 03/17/22   Short Term Goal #1 Min A for transfers supine <> short sit and sit <> stand with good balance   Short Term Goal #2 Supervision for amb  without AD for functional household distances with fair + balance   LTG Expiration Date 03/24/22   Long Term Goal #1 Indep amb  without AD for functional household distances with good balance on RA  Long Term Goal #2 Indep navigating 2 stairs to allow pt to enter/exit her nicole's home  Plan   Treatment/Interventions ADL retraining;Functional transfer training;LE strengthening/ROM; Elevations; Therapeutic exercise; Endurance training;Patient/family training;Bed mobility;Gait training;Spoke to nursing;Spoke to case management;OT;Family   PT Frequency Other (Comment)  (5x/wk)   Recommendation   PT Discharge Recommendation Home with home health rehabilitation   Additional Comments Pt has home set up and care at her neice's house upon d/c   Pt's nicole will be her caretaker  Pt's nicole works from home    Has hospital bed, w/c and vin walker   AM-[facility] Basic Mobility Inpatient   Turning in Bed Without Bedrails 2 Lying on Back to Sitting on Edge of Flat Bed 2   Moving Bed to Chair 3   Standing Up From Chair 3   Walk in Room 3   Climb 3-5 Stairs 2   Basic Mobility Inpatient Raw Score 15   Basic Mobility Standardized Score 36 97   Highest Level Of Mobility   [facility] Goal 4: Move to chair/commode   -[facility] Highest Level of Mobility 7: Walk 25 feet or more   -[facility] Goal Achieved Yes   Barthel Index   Feeding 10   Bathing 0   Grooming Score 0   Dressing Score 5   Bladder Score 10   Bowels Score 10   Toilet Use Score 5   Transfers (Bed/Chair) Score 10   Mobility (Level Surface) Score 0   Stairs Score 0   Barthel Index Score 50   Additional Treatment Session   Start Time 1450   End Time 1500   Treatment Assessment Pt has improved since last session in hospital a few days ago when she was d/c to rehab  Pt readmitted and today is able to stand and ambulate around room without AD with Min A and verbal cues  Pt ambulated 10 feet and 20 feet with changes indirection  Pt tolerated well  Will progress toward goals and expect pt to be able to go home to her neice's house when medically stable  Equipment Use none   Exercises   Balance training  walking with changes in direction without AD   End of Consult   Patient Position at End of Consult Bedside chair;Bed/Chair alarm activated; All needs within reach   End of Consult Comments family/friend still present    Licensure   [place] License Number  Lora Fernández PT  [id number]

## 2022-03-10 NOTE — H&P
[place] 47  H&P- Bill Gómez 1950, 73 y o  female MRN: [id number]  Unit/Bed#: [address] [place] [address] Encounter: [id number]  Primary Care Provider: Lili Richardson   Date and time admitted to hospital: 3/9/2022  4:26 PM    * Shortness of breath  Assessment & Plan  - secondary to acute on chronic diastolic CHF   - EF 55 % with grade I diastolic dysfunction as per Echo May 2019   - bilateral worsening on LE edema / on torsemide 10 mg po daily   - BNP 5992     - pro calcitonin negative - would hold off abx for aspiration related air space     opacities - low threshold for Abx   - CTA chest negative for pulmonary embolism     PLAN: Lasix 40 mg IV BID, c/w metoprolol, Lisinopril added, NTG added, daily weight, input output, fluid and salt restriction   - cardiology evaluation repeat echo ordered     Status post reverse total replacement of left shoulder  Assessment & Plan  - s/p fall resulting in Comminuted displaced fracture of the proximal humerus ( 2/23/22)  - s/p left shoulder reverse complete arthroplasty (3/1/22)   - I discussed with Dr Carmen Lopez - cleared from ortho standpoint for patient to start Eliquis - consult placed     Chest pain  Assessment & Plan  - secondary to tachycardia   - troponin negative   - resolved with control of heart rate   - echo ordered   - cardiology consulted       Paroxysmal atrial fibrillation (HCC)  Assessment & Plan  - on arrival rapid a fib with RVR as per ER   - responded to IV lopressor - continue 5 mg IV prn   - continue metoprolol tartrate 50 mg PO BID at home   - DEMETRIS Vasc score 3   - Dr Carmen Lopez cleared to resume Eliquis   - as per cardiology note for pre op clearance 3/1/22:  Patient typically has episodes of paroxysmal SVT & a fib was only in past - as per note - if no recurrence then consideration for dc Eliquis - cardiology consulted    - tele monitoring     Anemia  Assessment & Plan  - improved at 9 2   - likely worsened secondary to post op loss   - IV iron ordered since pro calcitonin negative   - unlikely to contribute to anemia at this level   - no blood in stool or urine   - fu B 12, folate level, reticulocyte, hemolysis panel  Nausea & vomiting  Assessment & Plan  - associated with palpitations   - once HR was controlled nausea resolved   - Last BM today morning - normal   - Zofran IV prn     Essential hypertension  Assessment & Plan  - on lopressor oral & IV prn  - enalapril added   - 2 gm sodium test       Mixed hyperlipidemia  Assessment & Plan  - on rosuvastatin   - fasting lipid profile     Morbid obesity with BMI of 45 0-49 9, adult ([facility] [place] [address] )  Assessment & Plan  - counseled weight loss   - sleep apnea - refusing CPAP     S/P total knee replacement, right  Assessment & Plan  - Right LE edema chronic after knee replacement   - now both legs swollen due to fluid overload     Diabetes mellitus, type 2 [facility])  Assessment & Plan  Lab Results   Component Value Date    HGBA1C 8 2 (H) 03/02/2022       Recent Labs     03/07/22  0736 03/07/22  1058 03/09/22  0833 03/09/22  2229   POCGLU 68 89 135 152*       Blood Sugar Average: Last 72 hrs:  (P) 152     - on Lantus 20 units SQ daily   - Insulin Sliding scale   - A1c ordered       VTE Pharmacologic Prophylaxis: VTE Score: 17 Moderate Risk (Score 3-4) - Pharmacological DVT Prophylaxis Ordered: apixaban (Eliquis)  Code Status: Level 1 - Full Code    Discussion with family: Patient declined call to   Anticipated Length of Stay: Patient will be admitted on an inpatient basis with an anticipated length of stay of greater than 2 midnights secondary to sob  Total Time for Visit, including Counseling / Coordination of Care: 60 minutes Greater than 50% of this total time spent on direct patient counseling and coordination of care      Chief Complaint: shortness of breath, hypoxia     History of Present Illness:  Padmini Ross is a 73 y o  female with a PMH of DM, hypertension, dyslipidemia, Chronic diastolic CHF, morbid obesity who recently had a fall resulting in left shoulder fracture  She is on eliquis for atrial fibrillation that was held and patient underwent reverse left shoulder complete arthroplasty on and dcd to short term rehab  I spoke to Dr Martina batista who cleared patient to resume eliquis  Patient denies any blood in stool or urine  Patient sent from facility for evaluation of chest pain and was found to be in rapid a fib with RVR and responded to IV lopressor  She had negative troponin and chest pain resolved after palpitations resolved  Labs showed abnormal UA and patient was dcd back to facility with keflex for UTI  However, patient was sent back again today due to intermittent drop in oxygen saturation  In ER, 96 % on 2 liters and  89 % on RA and in rapid A fib with RVR  No chest pain today but reports shortness of breath at rest    CTA chest was negative for pulmonary embolism  Small bilateral pleural effusions and bibasilar atelectasis  Few airspace opacities in the right lung could represent mild aspiration  Patient was given a dose of Lasix 40 mg IV in ER and one dose of IV lopressor 5 mg after which her shortness of breath improved  She was also feeling nauseous but after heart rate was controlled, her nausea resolved  BNP elevated at 5992   Echo as of may 2019 - EF 55 % with grade I diastolic dysfunction  She is being admitted for acute respiratory insufficiency secondary to acute on chronic diastolic CHF  Review of Systems:  Review of Systems   Constitutional: Positive for fatigue  Respiratory: Positive for shortness of breath  Cardiovascular: Positive for chest pain, palpitations and leg swelling  Genitourinary: Positive for dysuria  All other systems reviewed and are negative        Past Medical and Surgical History:   Past Medical History:   Diagnosis Date    A-fib ([facility] [address] )     Anemia     Asthma     Chronic kidney disease  CKD (chronic kidney disease)     Diabetes mellitus (HCC)     GERD (gastroesophageal reflux disease)     Hyperlipidemia     Hypertension     Kidney stones     PONV (postoperative nausea and vomiting)     SVT [facility]supraventricular tachycardia) ([facility])        Past Surgical History:   Procedure Laterality Date    APPENDECTOMY      CYSTOSCOPY W/ LASER LITHOTRIPSY Left 7/12/2016    Procedure: CYSTOSCOPY URETEROSCOPY WITH LITHOTRIPSY HOLMIUM LASER, RETROGRADE PYELOGRAM AND INSERTION STENT URETERAL;  Surgeon: Vivienne Rowe MD;  Location: [address] [place] [address];  Service:     DILATION AND CURETTAGE OF UTERUS      JOINT REPLACEMENT      right knee    KNEE ARTHROPLASTY Right     NC CYSTOURETHROSCOPY,URETER CATHETER Left 6/29/2016    Procedure: CYSTOSCOPY RETROGRADE PYELOGRAM WITH INSERTION STENT URETERAL, left;  Surgeon: Vivienne Rowe MD;  Location: [address] [place] [address];  Service: Urology    NC RECONSTR TOTAL SHOULDER IMPLANT Left 3/1/2022    Procedure: ARTHROPLASTY SHOULDER REVERSE;  Surgeon: Mame Yoder MD;  Location: [facility];  Service: Orthopedics    SHOULDER ARTHROTOMY Left        Meds/Allergies:  Prior to Admission medications    Medication Sig Start Date End Date Taking?  Authorizing Provider   acetaminophen (TYLENOL) 325 mg tablet Take 650 mg by mouth every 6 (six) hours as needed for mild pain  Patient not taking: Reported on 2/24/2022     Historical Provider, MD   albuterol (PROVENTIL HFA,VENTOLIN HFA) 90 mcg/act inhaler Inhale 2 puffs every 6 (six) hours as needed for wheezing    Historical Provider, MD   amLODIPine (NORVASC) 5 mg tablet Take 1 tablet (5 mg total) by mouth daily 8/2/19   Jae Ramírez MD   ammonium lactate (LAC-HYDRIN) 12 % lotion APPLY TOPICALLY TO AFFECTED AREAS twice a day 12/16/21   Historical Provider, MD   apixaban (ELIQUIS) 5 mg Take 1 tablet (5 mg total) by mouth 2 (two) times a day  Patient taking differently: Take 5 mg by mouth 2 (two) times a day Staff to check with surgeon when to hold  5/30/19   Mable Larson MD   cephalexin (KEFLEX) 500 mg capsule Take 1 capsule (500 mg total) by mouth 2 (two) times a day for 5 days 3/8/22 3/13/22  Blair Briggs,    desloratadine (CLARINEX) 5 MG tablet Take by mouth daily      Historical Provider, MD   insulin glargine (LANTUS) 100 units/mL subcutaneous injection Inject 20 Units under the skin daily at bedtime 3/7/22   Mable Larson MD   lidocaine (LIDODERM) 5 % Apply 2 patches topically daily Remove & Discard patch within 12 hours or as directed by MD 2/21/22   Jared Farris DO   metoprolol tartrate (LOPRESSOR) 25 mg tablet Take 2 tablets (50 mg total) by mouth every 12 (twelve) hours 9/10/19   Sejal Bee MD   montelukast (SINGULAIR) 10 mg tablet Take 10 mg by mouth daily      Historical Provider, MD Alicja Holguin 30G X 8 MM [address] [place] [address]  8/25/19   Historical Provider, MD   nystatin (MYCOSTATIN) powder Apply topically 2 (two) times a day    Historical Provider, MD   ondansetron (Zofran ODT) 4 mg disintegrating tablet Take 1 tablet (4 mg total) by mouth every 6 (six) hours as needed for nausea or vomiting  Patient not taking: Reported on 2/24/2022 2/20/22   Jared Verdin DO   polyethylene glycol (MIRALAX) 17 g packet Take 17 g by mouth daily 3/8/22   Mable Larson MD   pregabalin (LYRICA) 100 mg capsule Take 100 mg by mouth 2 (two) times a day     Historical Provider, MD   rosuvastatin (CRESTOR) 10 MG tablet 10 mg daily   8/29/19   Historical Provider, MD   Trulicity 1 5 IG/1 7VK SOPN inject 0 5 milliliter subcutaneously every week 28 12/16/21   Historical Provider, MD     I have reviewed home medications using recent Epic encounter  Allergies:    Allergies   Allergen Reactions    Penicillins Hives    Moxifloxacin Other (See Comments)     unknown    Percocet [Oxycodone-Acetaminophen] Other (See Comments)     unknown    Zinc Acetate Other (See Comments)     unknown    Nuts - Food Allergy Other (See Comments)   [place] [Aspirin] GI Intolerance    Indocin [Indomethacin] Other (See Comments)     Made patient "loopy"    Other Other (See Comments)     unknown       Social History:  Marital Status: Single      Patient Pre-hospital Living Situation: short term rehab   Patient Pre-hospital Level of Mobility: unable to be assessed at time of evaluation     Substance Use History:   Social History     Substance and Sexual Activity   Alcohol Use Not Currently    Comment: rarely     Social History     Tobacco Use   Smoking Status Former Smoker    Packs/day: 1 00    Years: 20 00    Pack years: 20 00    Types: Cigarettes    Quit date: 1996    Years since quittin 6   Smokeless Tobacco Never Used     Social History     Substance and Sexual Activity   Drug Use No       Family History:  Family History   Problem Relation Age of Onset    Heart disease Mother     Emphysema Maternal Grandmother     Heart disease Family        Physical Exam:     Vitals:   Blood Pressure: 168/84 (22)  Pulse: 74 (22)  Temperature: 98 1 °F (36 7 °C) (22)  Temp Source: Tympanic (22)  Respirations: 19 (22)  Height: 5' 2" (157 5 cm) (22)  Weight - Scale: 120 kg (265 lb) (22)  SpO2: 95 % (22)    Physical Exam  Constitutional:       Appearance: She is obese  HENT:      Head: Normocephalic and atraumatic  Mouth/Throat:      Mouth: Mucous membranes are moist    Eyes:      Extraocular Movements: Extraocular movements intact  Pupils: Pupils are equal, round, and reactive to light  Cardiovascular:      Rate and Rhythm: Rhythm irregular  Heart sounds: Normal heart sounds  Pulmonary:      Breath sounds: Normal breath sounds  Abdominal:      General: Abdomen is flat  Bowel sounds are normal       Palpations: Abdomen is soft  Tenderness: There is no abdominal tenderness  There is no guarding or rebound     Musculoskeletal:         General: Normal range of motion  Cervical back: Normal range of motion and neck supple  Comments: Left shoulder s/p replacement and scar healing well with mild erythema   Skin:     General: Skin is warm  Neurological:      General: No focal deficit present  Mental Status: She is alert and oriented to person, place, and time  Mental status is at baseline  Psychiatric:         Mood and Affect: Mood normal           Additional Data:     Lab Results:  Results from last 7 days   Lab Units 03/09/22  1830   WBC Thousand/uL 6 19   HEMOGLOBIN g/dL 9 2*   HEMATOCRIT % 31 7*   PLATELETS Thousands/uL 356   NEUTROS PCT % 56   LYMPHS PCT % 24   MONOS PCT % 13*   EOS PCT % 4     Results from last 7 days   Lab Units 03/09/22  1830   SODIUM mmol/L 146*   POTASSIUM mmol/L 4 6   CHLORIDE mmol/L 108   CO2 mmol/L 31   BUN mg/dL 32*   CREATININE mg/dL 1 14   ANION GAP mmol/L 7   CALCIUM mg/dL 8 4   ALBUMIN g/dL 2 4*   TOTAL BILIRUBIN mg/dL 0 41   ALK PHOS U/L 84   ALT U/L 17   AST U/L 13   GLUCOSE RANDOM mg/dL 149*     Results from last 7 days   Lab Units 03/09/22  1830   INR  1 22*     Results from last 7 days   Lab Units 03/09/22  2229 03/09/22  0833 03/07/22  1058 03/07/22  0736 03/07/22  0659 03/06/22  2339 03/06/22  2225 03/06/22  2153 03/06/22  1603 03/06/22  1103 03/06/22  0727 03/06/22  0229   POC GLUCOSE mg/dl 152* 135 89 68 50* 108 74 42* 65 78 73 115         Results from last 7 days   Lab Units 03/09/22  2106   PROCALCITONIN ng/ml 0 08       Imaging: Reviewed radiology reports from this admission including: chest CT scan  CTA ED chest PE Study   Final Result by Saige Rosen MD (03/09 2032)         1  Small bilateral pleural effusions and bibasilar atelectasis  Few airspace opacities in the right lung could represent mild aspiration  2   No evidence of acute pulmonary embolus, thoracic aortic aneurysm or dissection                    Workstation performed: [id number]         XR chest 1 view portable    (Results Pending) XR chest portable    (Results Pending)       EKG and Other Studies Reviewed on Admission:   · EKG: Atrial fibrillation  HR 74     ** Please Note: This note has been constructed using a voice recognition system   **

## 2022-03-10 NOTE — ASSESSMENT & PLAN NOTE
- secondary to acute on chronic diastolic CHF   - EF 55 % with grade I diastolic dysfunction as per Echo May 2019   - bilateral worsening on LE edema / on torsemide 10 mg po daily   - BNP 5992     - pro calcitonin negative - would hold off abx for aspiration related air space     opacities - low threshold for Abx   - CTA chest negative for pulmonary embolism     PLAN: Lasix 40 mg IV BID, c/w metoprolol, Lisinopril added, NTG added, daily weight, input output, fluid and salt restriction   - cardiology evaluation repeat echo ordered

## 2022-03-10 NOTE — PROGRESS NOTES
[facility] [address]  Progress Note Manjula Petersen 1950, 73 y o  female MRN: [id number]  Unit/Bed#: [address] [place] [address] Encounter: [id number]  Primary Care Provider: Linda Cisneros   Date and time admitted to hospital: 3/9/2022  4:26 PM    * Acute diastolic congestive heart failure [facility])  Assessment & Plan  Wt Readings from Last 3 Encounters:   03/10/22 122 kg (269 lb)   03/08/22 135 kg (297 lb 9 9 oz)   03/03/22 127 kg (279 lb 15 8 oz)     - SOB POA, secondary to acute on chronic diastolic CHF  BNP 5992  CT shows small b/l plerual effusions   - recent shoulder surgery, SEBASTIAN, not on diuretics at SNF (previously on torsemide 10mg/d)  - follow up repeat Echo - previously LVEF 55%  - pro calcitonin negative - would hold off abx for aspiration related air space opacities seen on CT    - CTA chest negative for pulmonary embolism   - continue Lasix IV 40mg BID, metoprolol  - daily weight, I/O, CHF diet   - cardiology evaluation appreciated     Status post reverse total replacement of left shoulder  Assessment & Plan  - s/p fall resulting in Comminuted displaced fracture of the proximal humerus (2/23/22)    - s/p left shoulder reverse complete arthroplasty (3/1/22)   - Cleared from ortho standpoint for patient to start Eliquis   - r/o LUE DVT venous duplex pending - left hand swelling noted   - PT / OT consulted   - will likely return to [facility] rehab upon discharge    Paroxysmal atrial fibrillation ([facility] [address] )  Assessment & Plan  - On arrival rapid a fib with RVR responded to IV lopressor - continue 5 mg IV prn   - continue metoprolol tartrate 50 mg PO BID at home   - DEMETRIS Vasc score 3   - Dr Rani Stephens cleared to resume Eliquis   - as per cardiology note for pre op clearance 3/1/22:  Patient typically has episodes of paroxysmal SVT & a fib was only in past - as per note - if no recurrence then consideration for dc Eliquis - cardiology consulted    - tele monitoring     Diabetes mellitus, type 2 ([facility] [address] )  Assessment & Plan  Lab Results   Component Value Date    HGBA1C 8 2 (H) 03/02/2022     Recent Labs     03/07/22  0736 03/07/22  1058 03/09/22  0833 03/09/22  2229   POCGLU 68 89 135 152*     Blood Sugar Average: Last 72 hrs:  (P) 152   - on Lantus 20 units SQ daily   - Insulin Sliding scale   - A1c ordered     Essential hypertension  Assessment & Plan  - on lopressor oral & IV prn  - lisinopril adding on admission will d/c in the setting of diuresis    Anemia  Assessment & Plan  - Hgb 8 9 today   - likely worsened secondary to post op loss     Mixed hyperlipidemia  Assessment & Plan  - on rosuvastatin    Morbid obesity with BMI of 45 0-49 9, adult ([facility] [place] [address] )  Assessment & Plan  - counseled weight loss   - sleep apnea - refusing CPAP     Chest pain-resolved as of 3/10/2022  Assessment & Plan  - secondary to tachycardia / CHF   - troponin negative   - resolved with control of heart rate     VTE Pharmacologic Prophylaxis: VTE Score: 17 High Risk (Score >/= 5) - Pharmacological DVT Prophylaxis Ordered: apixaban (Eliquis)  Sequential Compression Devices Ordered  Patient Centered Rounds: I performed bedside rounds with nursing staff today  Discussions with Specialists or Other Care Team Provider: nursing, cm     Education and Discussions with Family / Patient: Updated  (sister) at bedside  Time Spent for Care: 30 minutes  More than 50% of total time spent on counseling and coordination of care as described above  Current Length of Stay: 1 day(s)  Current Patient Status: Inpatient   Certification Statement: The patient will continue to require additional inpatient hospital stay due to pending improvement in diuresis   Discharge Plan: Anticipate discharge in 48-72 hrs to rehab facility  Code Status: Level 1 - Full Code    Subjective:   Pt seen and examined at bedside  Family is visiting  Pt reports left shoulder pain  Felt ok in the AM  Ambulating to the bathroom without dyspnea        Objective:     Vitals:   Temp (24hrs), Av 1 °F (36 7 °C), Min:97 4 °F (36 3 °C), Max:98 8 °F (37 1 °C)    Temp:  [97 4 °F (36 3 °C)-98 8 °F (37 1 °C)] 98 2 °F (36 8 °C)  HR:  [] 73  Resp:  [15-24] 19  BP: (118-168)/(63-84) 147/75  SpO2:  [91 %-98 %] 98 %  Body mass index is 49 2 kg/m²  Input and Output Summary (last 24 hours): Intake/Output Summary (Last 24 hours) at 3/10/2022 1631  Last data filed at 3/10/2022 1501  Gross per 24 hour   Intake 560 ml   Output 2750 ml   Net -2190 ml       Physical Exam:   Physical Exam  Constitutional:       Appearance: Normal appearance  She is obese  She is not ill-appearing  HENT:      Head: Normocephalic and atraumatic  Mouth/Throat:      Mouth: Mucous membranes are moist    Eyes:      Extraocular Movements: Extraocular movements intact  Cardiovascular:      Rate and Rhythm: Normal rate and regular rhythm  Heart sounds: No murmur heard  Pulmonary:      Effort: Pulmonary effort is normal       Breath sounds: Normal breath sounds  Comments: Dec at bases, on NC   Abdominal:      General: Bowel sounds are normal       Palpations: Abdomen is soft  Musculoskeletal:         General: Swelling (+2 pitting edema worse in right foot and LE compared to left  left hand and UE swelling noted) present  Normal range of motion  Cervical back: Normal range of motion and neck supple  Right lower leg: Edema present  Left lower leg: Edema present  Comments: Left shoulder immobilizer sling in place    Skin:     General: Skin is warm and dry  Neurological:      General: No focal deficit present  Mental Status: She is alert     Psychiatric:         Mood and Affect: Mood normal          Behavior: Behavior normal        Additional Data:     Labs:  Results from last 7 days   Lab Units 03/10/22  0513   WBC Thousand/uL 6 19   HEMOGLOBIN g/dL 8 9*   HEMATOCRIT % 30 9*   PLATELETS Thousands/uL 300   NEUTROS PCT % 54   LYMPHS PCT % 25   MONOS PCT % 15*   EOS PCT % 4 Results from last 7 days   Lab Units 03/10/22  0513   SODIUM mmol/L 146*   POTASSIUM mmol/L 4 2   CHLORIDE mmol/L 107   CO2 mmol/L 32   BUN mg/dL 30*   CREATININE mg/dL 1 24   ANION GAP mmol/L 7   CALCIUM mg/dL 8 2*   ALBUMIN g/dL 2 5*   TOTAL BILIRUBIN mg/dL 0 32   ALK PHOS U/L 79   ALT U/L 18   AST U/L 14   GLUCOSE RANDOM mg/dL 130     Results from last 7 days   Lab Units 03/10/22  0513   INR  1 34*     Results from last 7 days   Lab Units 03/10/22  1120 03/10/22  0729 03/09/22  2229 03/09/22  0833 03/07/22  1058 03/07/22  0736 03/07/22  0659 03/06/22  2339 03/06/22  2225 03/06/22  2153 03/06/22  1603 03/06/22  1103   POC GLUCOSE mg/dl 158* 121 152* 135 89 68 50* 108 74 42* 65 78     Results from last 7 days   Lab Units 03/09/22  1830   HEMOGLOBIN A1C % 6 8*     Results from last 7 days   Lab Units 03/10/22  0513 03/09/22  2106   PROCALCITONIN ng/ml 0 08 0 08       Lines/Drains:  Invasive Devices  Report    Peripheral Intravenous Line            Peripheral IV 03/09/22 Right;Ventral (anterior) Forearm <1 day                  Telemetry:  Telemetry Orders (From admission, onward)             48 Hour Telemetry Monitoring  (ED Bridging Orders Panel)  Continuous x 48 hours        References:    Telemetry Guidelines   Question:  Reason for 48 Hour Telemetry  Answer:  Acute Decompensated CHF (continuous diuretic infusion or total diuretic dose > 200 mg daily, associated electrolyte derangement, ionotropic drip, history of ventricular arrhythmia, or new EF <35%)                 Telemetry Reviewed: Normal Sinus Rhythm  Indication for Continued Telemetry Use: No indication for continued use  Will discontinue  Imaging: Reviewed radiology reports from this admission including: chest CT scan    Recent Cultures (last 7 days):   Results from last 7 days   Lab Units 03/09/22  2126 03/09/22  2106   BLOOD CULTURE  Received in Microbiology Lab  Culture in Progress  Received in Microbiology Lab  Culture in Progress  Last 24 Hours Medication List:   Current Facility-Administered Medications   Medication Dose Route Frequency Provider Last Rate    albuterol  2 puff Inhalation Q6H PRN Velvet Morales MD      aluminum-magnesium hydroxide-simethicone  30 mL Oral Q6H PRN Velvet Morales MD      amLODIPine  5 mg Oral Daily Velvet Morales MD      ammonium lactate   Topical BID Velvet Morales MD      apixaban  5 mg Oral BID Velvet Morales MD      ferric gluconate  125 mg Intravenous Daily Velvet Morales MD      furosemide  40 mg Intravenous BID (diuretic) Velvet Morales MD      insulin glargine  20 Units Subcutaneous HS Velvet Morales MD      insulin lispro  1-5 Units Subcutaneous TID [facility] Velvet Morales MD      insulin lispro  1-5 Units Subcutaneous HS Velvet Morales MD      lidocaine  2 patch Topical Daily Velvet Morales MD      loratadine  10 mg Oral Daily Velvet Morales MD      magnesium hydroxide  30 mL Oral Daily PRN Velvet Morales MD      metoprolol  5 mg Intravenous Q6H PRN Velvet Morales MD      metoprolol tartrate  50 mg Oral Q12H Thermon MD Seymour      montelukast  10 mg Oral HS Velvet Morales MD      nitroglycerin  0 4 mg Sublingual Q5 Min PRN Velvet Morales MD      nystatin   Topical BID Velvet Morales MD      ondansetron  4 mg Intravenous Q6H PRN Velvet Morales MD      pravastatin  80 mg Oral Daily With Raysa Chavez MD      pregabalin  100 mg Oral BID Velvet Morales MD      traMADol  50 mg Oral Q6H PRN Stuart Rm PA-C          Today, Patient Was Seen By: Newman Goldmann, PA-C    **Please Note: This note may have been constructed using a voice recognition system  **

## 2022-03-10 NOTE — ASSESSMENT & PLAN NOTE
- s/p fall resulting in Comminuted displaced fracture of the proximal humerus (2/23/22)    - s/p left shoulder reverse complete arthroplasty (3/1/22)   - Cleared from ortho standpoint for patient to start Eliquis   - r/o EDUARDO DVT venous duplex pending - left hand swelling noted   - PT / OT consulted   - will likely return to [place] rehab upon discharge

## 2022-03-10 NOTE — PROGRESS NOTES
Discussed with Dr Alyssa Roth, from ortho stand point patient has been cleared for resuming eliquis       Hgb 9 2 no evidence of bleeding as of now     Cardiology also consulted

## 2022-03-10 NOTE — OCCUPATIONAL THERAPY NOTE
Occupational Therapy Evaluation/Treatment       03/10/22 1520   Note Type   Note type Evaluation   Restrictions/Precautions   Weight Bearing Precautions Per Order Yes   LUE Weight Bearing Per Order NWB   Braces or Orthoses   (L shoulder sling with abduction pillow)   Other Precautions Chair Alarm; Bed Alarm; Fall Risk;O2  (NO AROM L shoulder, AROM elbow/wrist ok)   Pain Assessment   Pain Assessment Tool Guan-Baker FACES   Guan-Baker FACES Pain Rating 6   Pain Location/Orientation Orientation: Left; Location: Shoulder   Home Living   Type of Home SNF   Additional Comments Patient is s/p L reverse TSA on 3/1/22 s/p fracture; pt [facility] then returned to ED with c/o chest pain; previously patient was requiring assist for all ADLs and mobility with use of hemiwalker;  Pt reports discharge plan is now to return to her niece's home: 1 floor home with 2 JESS, pt will have hospital bed, hemiwalker, and wheelchair, niece works from home and will be able to provide assist to patient   Prior Function   Level of [place] Needs assistance with ADLs and functional mobility   ADL Assistance Needs assistance   IADLs Needs assistance   ADL   Eating Assistance 5  Supervision/Setup   Grooming Assistance 5  Supervision/Setup   UB Bathing Assistance 2  Maximal Assistance   LB Bathing Assistance 2  Maximal Assistance    [address] 2  Maximal Assistance    [address] 2  Maximal [address]  4  Minimal Assistance   Bed Mobility   Additional Comments Not assessed, patient received seated in recliner chair   Transfers   Sit to Stand 4  Minimal assistance   Stand to Sit 4  Minimal assistance   Functional Mobility   Functional Mobility 4  Minimal assistance   Additional Comments Patient ambulated short household distance in room without AD   Balance   Static Sitting Fair +   Dynamic Sitting Fair   Static Standing Fair   Dynamic Standing Fair   Activity Tolerance   Activity Tolerance Patient limited by fatigue;Patient limited by pain   RUE Assessment   RUE Assessment WFL   LUE Assessment   LUE Assessment X  (Shoulder NT d/t precautions, wrist/hand WFL)   Cognition   Overall Cognitive Status WFL   Arousal/Participation Alert; Cooperative   Attention Within functional limits   Orientation Level Oriented X4   Memory Within functional limits   Following Commands Follows all commands and directions without difficulty   Assessment   Limitation Decreased ADL status; Decreased UE ROM; Decreased UE strength;Decreased Safe judgement during ADL;Decreased endurance;Decreased self-care trans;Decreased high-level ADLs   Prognosis Good   Assessment Patient evaluated by Occupational Therapy  Patient admitted with Acute diastolic congestive heart failure ([facility] [address] )  The patients occupational profile, medical and therapy history includes a extensive additional review of physical, cognitive, or psychosocial history related to current functional performance  Comorbidities affecting functional mobility and ADLS include: asthma, DM, GERD, HLD, HTN, Afib  Prior to admission, patient was requiring assist for functional mobility with hemiwalker, requiring assist for ADLS, requiring assist for IADLS and in short term rehab  The evaluation identifies the following performance deficits: weakness, decreased ROM, impaired balance, decreased endurance, increased fall risk, new onset of impairment of functional mobility, decreased ADLS, decreased IADLS, pain, decreased activity tolerance, decreased safety awareness, ortheopedic restrictions and decreased strength, that result in activity limitations and/or participation restrictions  This evaluation requires clinical decision making of high complexity, because the patient presents with comorbidites that affect occupational performance and required significant modification of tasks or assistance with consideration of multiple treatment options    The Barthel Index was used as a functional outcome tool presenting with a score of Barthel Index Score: 50, indicating marked limitations of functional mobility and ADLS  The patient's raw score on the AM-PAC Daily Activity inpatient short form is 16, standardized score is 35 96, less than 39 4  Patients at this level are likely to benefit from DC to post-acute rehabilitation services  Please refer to the recommendation of the Occupational Therapist for safe DC planning  Despite AM-PAC score, patient with plans to return to niece's home with 24 hour care and assist for all ADLs and mobility  Recommend home with home OT services to address ADL limitations  Patient will benefit from skilled Occupational Therapy services to address above deficits and facilitate a safe return to prior level of function  Goals   Patient Goals 'To go home to my niece's house'   STG Time Frame   (1-7 days)   Short Term Goal  Goals established to promote Patient Goals: 'To go home to my niece's house':  Patient will increase standing tolerance to 5 minutes during ADL task to decrease assistance level and decrease fall risk; Patient will increase bed mobility to min assist in preparation for ADLS and transfers;  Patient will increase functional mobility to and from bathroom with no assistive device with supervision to increase performance with ADLS and to use a toilet; Patient will tolerate 5 minutes of UE ROM/strengthening to increase general activity tolerance and performance in ADLS/IADLS; Patient will improve functional activity tolerance to 5 minutes of sustained functional tasks to increase participation in basic self-care and decrease assistance level;  Patient will be able to to verbalize understanding and perform energy conservation/proper body mechanics during ADLS and functional mobility at least 75% of the time with minimal cueing to decrease signs of fatigue and increase stamina to return to prior level of function; Patient will increase dynamic sitting balance to fair+ to improve the ability to sit at edge of bed or on a chair for ADLS;  Patient will increase static/dynamic standing balance to fair+ to improve postural stability and decrease fall risk during standing ADLS and transfers  Patient will verbalize shoulder precautions at least 75% of the time with minimal cueing to promote safety during ADL/iADL and functional mobility performance; Patient will don/doff sling with mod assist; Patient will perform upper body dressing while adhering to shoulder precautions with mod assist  Patient/family will participate in family training to address shoulder precautions and safe ADL performance while maintaining precautions   LTG Time Frame   (8-14 days)   Long Term Goal Patient will increase standing tolerance to 10 minutes during ADL task to decrease assistance level and decrease fall risk; Patient will increase bed mobility to supervision in preparation for ADLS and transfers; Patient will increase functional mobility to and from bathroom with no assistive device independently to increase performance with ADLS and to use a toilet; Patient will tolerate 10 minutes of UE ROM/strengthening to increase general activity tolerance and performance in ADLS/IADLS; Patient will improve functional activity tolerance to 10 minutes of sustained functional tasks to increase participation in basic self-care and decrease assistance level;  Patient will be able to to verbalize understanding and perform energy conservation/proper body mechanics during ADLS and functional mobility at least 90% of the time with no cueing to decrease signs of fatigue and increase stamina to return to prior level of function; Patient will increase static/dynamic sitting balance to good to improve the ability to sit at edge of bed or on a chair for ADLS;  Patient will increase static/dynamic standing balance to good to improve postural stability and decrease fall risk during standing ADLS and transfers;  Patient will verbalize shoulder precautions at least 90% of the time with no cueing to promote safety during ADL/iADL and functional mobility performance; Patient will don/doff sling with min assist; Patient will perform upper body dressing while adhering to shoulder precautions with min assist; Pt will score >/= 20/24 on AM-PAC Daily Activity Inpatient scale to promote safe independence with ADLs and functional mobility; Pt will score >/= 80/100 on Barthel Index in order to decrease caregiver assistance needed and increase ability to perform ADLs and functional mobility  Functional Transfer Goals   Pt Will Perform All Functional Transfers   (STG supervision, LTG independent)   ADL Goals   Pt Will Perform Eating   (LTG independent)   Pt Will Perform Grooming   (LTG independent)   Pt Will Perform Bathing   (STG mod assist, LTG min assist)   Pt Will Perform UE Dressing   (STG mod assist, LTG min assist)   Pt Will Perform LE Dressing   (STG mod assist, LTG min assist)   Pt Will Perform Toileting   (STG supervision, LTG independent)   Plan   Treatment Interventions ADL retraining;Functional transfer training;UE strengthening/ROM; Endurance training;Patient/family training;Equipment evaluation/education; Compensatory technique education;Continued evaluation; Energy conservation; Activityengagement   Goal Expiration Date 03/24/22   OT Frequency 5x/wk   Additional Treatment Session   Start Time 1510   End Time 1520   Treatment Assessment S: "I am doing so much better" O: Patient performed upper body dressing: don/doff hospital gown with max assist; don/doff shoulder sling with total assist, limited by pain  Sling readjusted for appropriate fit  Noted to have missing waist strap so abduction pillow not staying in placing or supporting patient's shoulder  Reached out to ortho PA-C to try and find replacement strap for patient  Will follow up   A; Patient with significant improvement in functional mobility since discharge earlier this week ; plans for patient now to return to niece's house and niece will provide assist with ADLs/iADLs as needed  Plan for family training tomorrow afternoon when niece is present for education/review of shoulder precautions, sling management/education, and ADL training  Pt verbalized understanding and reports niece should be at hospital sometime after 1PM tomorrow, OT will follow up tomorrow  P: Return home with family support, home OT for ADL training      Recommendation   OT Discharge Recommendation Home with home health rehabilitation   AM-PAC Daily Activity Inpatient   Lower Body Dressing 2   Bathing 2   Toileting 2   Upper Body Dressing 2   Grooming 4   Eating 4   Daily Activity Raw Score 16   Daily Activity Standardized Score (Calc for Raw Score >=11) 35 96   AM-PAC Applied Cognition Inpatient   Following a Speech/Presentation 4   Understanding Ordinary Conversation 4   Taking Medications 4   Remembering Where Things Are Placed or Put Away 4   Remembering List of 4-5 Errands 4   Taking Care of Complicated Tasks 4   Applied Cognition Raw Score 24   Applied Cognition Standardized Score 62 21   Barthel Index   Feeding 10   Bathing 0   Grooming Score 0   Dressing Score 5   Bladder Score 10   Bowels Score 10   Toilet Use Score 5   Transfers (Bed/Chair) Score 10   Mobility (Level Surface) Score 0   Stairs Score 0   Barthel Index Score 48   Licensure   [place] License Number  [id number], OTR/L [id number]

## 2022-03-11 ENCOUNTER — APPOINTMENT (INPATIENT)
Dept: FAMILY MEDICINE CLINIC | Facility: HOSPITAL | Age: 72
DRG: 291 | End: 2022-03-11
Payer: MEDICARE

## 2022-03-11 LAB
BACTERIA UR CULT: NORMAL
GLUCOSE SERPL-MCNC: 105 MG/DL (ref 65–140)
GLUCOSE SERPL-MCNC: 157 MG/DL (ref 65–140)
GLUCOSE SERPL-MCNC: 193 MG/DL (ref 65–140)
GLUCOSE SERPL-MCNC: 202 MG/DL (ref 65–140)
SL CV LV EF: 53

## 2022-03-11 PROCEDURE — 93306 TTE W/DOPPLER COMPLETE: CPT | Performed by: INTERNAL MEDICINE

## 2022-03-11 PROCEDURE — 97530 THERAPEUTIC ACTIVITIES: CPT

## 2022-03-11 PROCEDURE — 99232 SBSQ HOSP IP/OBS MODERATE 35: CPT | Performed by: PHYSICIAN ASSISTANT

## 2022-03-11 PROCEDURE — 82948 REAGENT STRIP/BLOOD GLUCOSE: CPT

## 2022-03-11 PROCEDURE — 99232 SBSQ HOSP IP/OBS MODERATE 35: CPT | Performed by: INTERNAL MEDICINE

## 2022-03-11 PROCEDURE — 97535 SELF CARE MNGMENT TRAINING: CPT

## 2022-03-11 RX ORDER — TORSEMIDE 20 MG/1
20 TABLET ORAL DAILY
Status: DISCONTINUED | OUTPATIENT
Start: 2022-03-12 | End: 2022-03-13

## 2022-03-11 RX ADMIN — INSULIN LISPRO 1 UNITS: 100 INJECTION, SOLUTION INTRAVENOUS; SUBCUTANEOUS at 16:48

## 2022-03-11 RX ADMIN — NYSTATIN 1 APPLICATION: 100000 POWDER TOPICAL at 18:08

## 2022-03-11 RX ADMIN — METOPROLOL TARTRATE 50 MG: 50 TABLET, FILM COATED ORAL at 21:50

## 2022-03-11 RX ADMIN — LORATADINE 10 MG: 10 TABLET ORAL at 08:56

## 2022-03-11 RX ADMIN — AMLODIPINE BESYLATE 5 MG: 5 TABLET ORAL at 08:56

## 2022-03-11 RX ADMIN — METOPROLOL TARTRATE 50 MG: 50 TABLET, FILM COATED ORAL at 08:56

## 2022-03-11 RX ADMIN — NYSTATIN: 100000 POWDER TOPICAL at 10:56

## 2022-03-11 RX ADMIN — PREGABALIN 100 MG: 100 CAPSULE ORAL at 17:00

## 2022-03-11 RX ADMIN — PRAVASTATIN SODIUM 80 MG: 80 TABLET ORAL at 16:47

## 2022-03-11 RX ADMIN — ONDANSETRON 4 MG: 2 INJECTION INTRAMUSCULAR; INTRAVENOUS at 03:45

## 2022-03-11 RX ADMIN — LIDOCAINE 5% 2 PATCH: 700 PATCH TOPICAL at 08:47

## 2022-03-11 RX ADMIN — MONTELUKAST 10 MG: 10 TABLET, FILM COATED ORAL at 21:50

## 2022-03-11 RX ADMIN — APIXABAN 5 MG: 5 TABLET, FILM COATED ORAL at 08:56

## 2022-03-11 RX ADMIN — INSULIN LISPRO 1 UNITS: 100 INJECTION, SOLUTION INTRAVENOUS; SUBCUTANEOUS at 21:52

## 2022-03-11 RX ADMIN — Medication: at 18:09

## 2022-03-11 RX ADMIN — TRAMADOL HYDROCHLORIDE 50 MG: 50 TABLET, COATED ORAL at 03:46

## 2022-03-11 RX ADMIN — FUROSEMIDE 40 MG: 10 INJECTION, SOLUTION INTRAMUSCULAR; INTRAVENOUS at 08:50

## 2022-03-11 RX ADMIN — INSULIN LISPRO 1 UNITS: 100 INJECTION, SOLUTION INTRAVENOUS; SUBCUTANEOUS at 11:54

## 2022-03-11 RX ADMIN — PREGABALIN 100 MG: 100 CAPSULE ORAL at 08:56

## 2022-03-11 RX ADMIN — APIXABAN 5 MG: 5 TABLET, FILM COATED ORAL at 18:09

## 2022-03-11 RX ADMIN — INSULIN GLARGINE 20 UNITS: 100 INJECTION, SOLUTION SUBCUTANEOUS at 21:52

## 2022-03-11 RX ADMIN — FUROSEMIDE 40 MG: 10 INJECTION, SOLUTION INTRAMUSCULAR; INTRAVENOUS at 16:47

## 2022-03-11 RX ADMIN — Medication 1 APPLICATION: at 09:01

## 2022-03-11 RX ADMIN — TRAMADOL HYDROCHLORIDE 50 MG: 50 TABLET, COATED ORAL at 13:46

## 2022-03-11 RX ADMIN — SODIUM FERRIC GLUCONATE COMPLEX 125 MG: 12.5 INJECTION INTRAVENOUS at 09:04

## 2022-03-11 NOTE — PLAN OF CARE
Problem: OCCUPATIONAL THERAPY ADULT  Goal: Performs self-care activities at highest level of function for planned discharge setting  See evaluation for individualized goals  Description: Treatment Interventions: ADL retraining,Functional transfer training,UE strengthening/ROM,Endurance training,Patient/family training,Equipment evaluation/education,Compensatory technique education,Continued evaluation,Energy conservation,Activityengagement          See flowsheet documentation for full assessment, interventions and recommendations  Outcome: Progressing  Note: Limitation: Decreased ADL status,Decreased UE ROM,Decreased UE strength,Decreased Safe judgement during ADL,Decreased endurance,Decreased self-care trans,Decreased high-level ADLs  Prognosis: Good  Assessment: Patient seen for OT treatment  Patient and niece educated on dressing and ADLS  Niece demonstrated understanding of dressing and donning/doffing sling  Patient requires max assist for ADLS at this time due to L arm immobilizer and patient weakness  Patients plan to return home with niece  Patients niece demonstrated and verbalized understanding  Patient will benefit from continued OT services to maximize functional performance with ADLS         OT Discharge Recommendation: Home with home health rehabilitation

## 2022-03-11 NOTE — PROGRESS NOTES
Progress Note - Cardiology   [facility]     Angeli Cornejo 73 y o  female MRN: [id number]  : 1950  Unit/Bed#: [place] [address] Encounter: [id number]    Assessment and Plan:   1  Acute on chronic diastolic heart failure:  Noted patient was not discharged on her home dose of torsemide    -  patient diuresing well with IV Lasix 40 mg b i d , will continue for today      -  transition back to torsemide 20 mg daily starting 2022    -  low-sodium diet    -  I&O, daily weights and monitor labs    2  Superficial thrombophlebitis of left cephalic vein:  Eliquis resumed    3  Paroxysmal atrial fibrillation:  Currently sinus rhythm    -  Eliquis 5 mg b i d  Was resumed during this admission    -  continue Lopressor 50 mg q 12 hours     4  Hypertension:  Blood pressure stable on Lopressor 50 mg b i d  And Norvasc 5 mg daily    -  continue to monitor    5  Postop Anemia:  Hemoglobin stable    6  Left total shoulder replacement:  2022     Subjective / Objective:   Patient seen and examined  She is sitting out of bed in the chair  Venous duplex of left arm demonstrated superficial thrombophlebitis of the left cephalic vein  Edema slowly improving with elevation of left arm  Vitals: Blood pressure 134/61, pulse 86, temperature 98 °F (36 7 °C), resp  rate 18, height 5' 2" (1 575 m), weight 124 kg (273 lb 6 4 oz), SpO2 92 %, not currently breastfeeding  Vitals:    03/10/22 0734 22 0534   Weight: 122 kg (269 lb) 124 kg (273 lb 6 4 oz)     Body mass index is 50 01 kg/m²  BP Readings from Last 3 Encounters:   22 134/61   22 144/66   22 164/69     Orthostatic Blood Pressures      Most Recent Value   Blood Pressure 134/61 filed at 2022 3150   Patient Position - Orthostatic VS Lying filed at 2022        I/O        0701  03/10 0700 03/10 0701   07 07 0700    P  O   1000 250    I V  (mL/kg)  10 (0 1) 10 (0 1)    Total Intake(mL/kg)  1010 (8 1) 260 (2 1)    Urine (mL/kg/hr) 950 2500 (0 8) 500 (1 6)    Total Output 950 2500 500    Net -950 -1490 -240               Invasive Devices  Report    Peripheral Intravenous Line            Peripheral IV 03/09/22 Right;Ventral (anterior) Forearm 1 day                  Intake/Output Summary (Last 24 hours) at 3/11/2022 0933  Last data filed at 3/11/2022 0830  Gross per 24 hour   Intake 1270 ml   Output 2550 ml   Net -1280 ml         Physical Exam:   Physical Exam  Vitals and nursing note reviewed  Constitutional:       Appearance: Normal appearance  She is well-developed  She is morbidly obese  HENT:      Head: Normocephalic  Right Ear: External ear normal       Left Ear: External ear normal    Eyes:      General: No scleral icterus  Right eye: No discharge  Left eye: No discharge  Pupils: Pupils are equal, round, and reactive to light  Neck:      Thyroid: No thyromegaly  Cardiovascular:      Rate and Rhythm: Normal rate and regular rhythm  Pulses: Normal pulses  Heart sounds: Heart sounds are distant  Pulmonary:      Effort: Pulmonary effort is normal  No respiratory distress  Breath sounds: Normal breath sounds  No wheezing, rhonchi or rales  Abdominal:      General: Bowel sounds are normal  There is no distension  Palpations: Abdomen is soft  Musculoskeletal:         General: Swelling present  Cervical back: Normal range of motion and neck supple  Right lower leg: Edema present  Left lower leg: Edema present  Skin:     General: Skin is warm and dry  Neurological:      General: No focal deficit present  Mental Status: She is alert and oriented to person, place, and time  Mental status is at baseline     Psychiatric:         Mood and Affect: Mood normal                  Medications/ Allergies:     Current Facility-Administered Medications   Medication Dose Route Frequency Provider Last Rate    albuterol  2 puff Inhalation Q6H PRN Judit Bhatti MD      aluminum-magnesium hydroxide-simethicone  30 mL Oral Q6H PRN Judit Bhatti MD      amLODIPine  5 mg Oral Daily Judit Bhatti MD      ammonium lactate   Topical BID Judit Bhatti MD      apixaban  5 mg Oral BID Judit Bhatti MD      ferric gluconate  125 mg Intravenous Daily Judit Bhatti MD      furosemide  40 mg Intravenous BID (diuretic) RK Donis      insulin glargine  20 Units Subcutaneous HS Judit Bhatti MD      insulin lispro  1-5 Units Subcutaneous TID [facility] Judit Bhatti MD      insulin lispro  1-5 Units Subcutaneous HS Judit Bhatti MD      lidocaine  2 patch Topical Daily Judit Bhatti MD      loratadine  10 mg Oral Daily Judit Bhatti MD      magnesium hydroxide  30 mL Oral Daily PRN Judit Bhatti MD      metoprolol  5 mg Intravenous Q6H PRN Judit Bhatti MD      metoprolol tartrate  50 mg Oral Q12H [facility] Judit Bhatti MD      montelukast  10 mg Oral HS Judit Bhatti MD      nitroglycerin  0 4 mg Sublingual Q5 Min PRN Judit Bhatti MD      nystatin   Topical BID Judit Bhatti MD      ondansetron  4 mg Intravenous Q6H PRN Judit Bhatti MD      pravastatin  80 mg Oral Daily With Eleazar Gómez MD      pregabalin  100 mg Oral BID MD Oneil Mcgraw ON 3/12/2022] torsemide  20 mg Oral Daily RK Donis      traMADol  50 mg Oral Q6H PRN Mikey Rm PA-C       albuterol, 2 puff, Q6H PRN  aluminum-magnesium hydroxide-simethicone, 30 mL, Q6H PRN  magnesium hydroxide, 30 mL, Daily PRN  metoprolol, 5 mg, Q6H PRN  nitroglycerin, 0 4 mg, Q5 Min PRN  ondansetron, 4 mg, Q6H PRN  traMADol, 50 mg, Q6H PRN      Allergies   Allergen Reactions    Penicillins Hives    Moxifloxacin Other (See Comments)     unknown    Percocet [Oxycodone-Acetaminophen] Other (See Comments)     unknown    Zinc Acetate Other (See Comments)     unknown    Nuts - Food Allergy Other (See Comments)    Asa [Aspirin] GI Intolerance    Indocin [Indomethacin] Other (See Comments)     Made patient "loopy"    Other Other (See Comments)     unknown       VTE Pharmacologic Prophylaxis:   Sequential compression device (Venodyne)     Labs:   Troponins:  Results from last 7 days   Lab Units 03/09/22 2228 03/09/22  2040 03/08/22 2012 03/05/22  0558   CK TOTAL U/L  --   --   --  102   HSTNI D2 ng/L  --  0   < >  --    HSTNI D4 ng/L 0  --   --   --     < > = values in this interval not displayed       CBC with diff:  Results from last 7 days   Lab Units 03/10/22  0513 03/09/22  1830 03/08/22  1736 03/07/22  0543 03/06/22  1610 03/06/22  0919 03/05/22  0558   WBC Thousand/uL 6 19 6 19 6 02 6 85  --   --  9 34   HEMOGLOBIN g/dL 8 9* 9 2* 8 8* 8 8* 7 3* 6 0* 7 8*   HEMATOCRIT % 30 9* 31 7* 29 9* 29 5* 24 7* 21 2* 25 9*   MCV fL 99* 96 96 96  --   --  99*   PLATELETS Thousands/uL 300 356 331 297  --   --  320   MCH pg 28 4 28 0 28 3 28 6  --   --  29 7   MCHC g/dL 28 8* 29 0* 29 4* 29 8*  --   --  30 1*   RDW % 15 7* 15 5* 15 8* 15 9*  --   --  16 2*   MPV fL 9 9 10 0 9 8 9 8  --   --  9 9   NRBC AUTO /100 WBCs 0 0 0  --   --   --  0       CMP:  Results from last 7 days   Lab Units 03/10/22  0513 03/09/22  1830 03/08/22  1736 03/07/22  0543 03/06/22  0510 03/05/22  0558   SODIUM mmol/L 146* 146* 145 143 142 139   POTASSIUM mmol/L 4 2 4 6 4 6 4 3 3 9 4 4   CHLORIDE mmol/L 107 108 107 106 105 103   CO2 mmol/L 32 31 27 30 31 28   ANION GAP mmol/L 7 7 11 7 6 8   BUN mg/dL 30* 32* 37* 48* 49* 47*   CREATININE mg/dL 1 24 1 14 1 24 1 52* 2 02* 2 02*   CALCIUM mg/dL 8 2* 8 4 8 2* 8 4 7 9* 7 7*   AST U/L 14 13 22  --   --   --    ALT U/L 18 17 18  --   --   --    ALK PHOS U/L 79 84 83  --   --   --    TOTAL PROTEIN g/dL 6 3* 6 2* 6 2*  --   --   --    ALBUMIN g/dL 2 5* 2 4* 2 3*  --   --   --    TOTAL BILIRUBIN mg/dL 0 32 0 41 0 52  --   --   --    EGFR ml/min/1 73sq m 44 49 44 34 24 24       Magnesium:  Results from last 7 days   Lab Units 03/10/22  0513 03/09/22  1830 03/08/22  1738 MAGNESIUM mg/dL 2 1 2 3 2 5     Coags:  Results from last 7 days   Lab Units 03/10/22  0513 03/09/22  1830 03/08/22  1736   PTT seconds 40* 37 41*   INR  1 34* 1 22* 1 36*     TSH:  Results from last 7 days   Lab Units 03/09/22  1830   TSH 3RD GENERATON uIU/mL 3 550     No components found for: TSH3  Lipid Profile:  Results from last 7 days   Lab Units 03/10/22  0513   CHOLESTEROL mg/dL 104   TRIGLYCERIDES mg/dL 132   HDL mg/dL 30*   LDL CALC mg/dL 48     Hgb A1c:  Results from last 7 days   Lab Units 03/09/22  1830   HEMOGLOBIN A1C % 6 8*     NT-proBNP:   Recent Labs     03/09/22  1830   NTBNP 5,992*        Imaging & Testing   I have personally reviewed pertinent reports  XR chest portable    Result Date: 3/10/2022  Narrative: CHEST INDICATION:   sob  fu on status of fluid overload  COMPARISON:  3/9/2022  EXAM PERFORMED/VIEWS:  XR CHEST PORTABLE Images:  1 FINDINGS: Cardiac and mediastinal contours are stable and within normal limits  The aorta is calcified  There is increased pulmonary vascular congestion and development of small bilateral pleural effusions  No pneumothorax or focal airspace consolidation  Left shoulder reverse total arthroplasty partially seen  Impression: Increased pulmonary vascular congestion and development of small bilateral pleural effusions  Workstation performed: [id number]     XR chest 1 view portable    Result Date: 3/10/2022  Narrative: CHEST INDICATION:   shortness of breath  COMPARISON:  3/8/2022 ; 7/31/2019 EXAM PERFORMED/VIEWS:  XR CHEST PORTABLE FINDINGS:  Mild hypoventilation is present  Cardiomediastinal silhouette appears unremarkable  Subsegmental atelectasis at the bases is noted probably related to hypoventilation although some developing edema is difficult to exclude  No pneumothorax or pleural effusion  Left shoulder replacement is present  Impression: Hypoventilation with subsegmental atelectasis at the bases    Developing edema or inflammatory infiltrates are difficult to exclude  Please see subsequent CT for further report Workstation performed: [id number]     XR chest 1 view portable    Result Date: 3/9/2022  Narrative: CHEST INDICATION:   chest pain  COMPARISON:  7/31/2019 EXAM PERFORMED/VIEWS:  XR CHEST PORTABLE Single view FINDINGS: Cardiomediastinal silhouette appears unremarkable  The lungs are clear  No pneumothorax or pleural effusion  Reverse left shoulder arthroplasty has been performed since the prior study     Impression: No acute cardiopulmonary disease  Findings are stable except for left shoulder arthroplasty Workstation performed: [id number]       XR shoulder left 1 view    Result Date: 3/3/2022  Narrative: LEFT SHOULDER INDICATION:   post op  COMPARISON:  2/23/2022 VIEWS:  XR SHOULDER 1 VW LEFT FINDINGS: There is a reverse left shoulder arthroplasty  Components are in anatomic alignment  There are expected postoperative changes  Impression: Anatomic alignment of left shoulder reverse arthroplasty  Workstation performed: [id number]       VAS upper limb venous duplex scan, unilateral/limited    Result Date: 3/10/2022  Narrative:  [facility] REPORT CLINICAL: Indications:  Patient presents with left upper extremity edema x  several days  S/P left shoulder surgery 3/1/2022  Operative History: Patient denies any cardiovascular surgeries  Risk Factors The patient has history of HTN, Diabetes (Yes), Hyperlipidemia, CKD and previous smoking (quit >10yrs ago)  FINDINGS:  Left          Thrombus  Ceph Mid Arm  Acute        CONCLUSION: Impression  RIGHT UPPER LIMB LIMITED: Evaluation shows no evidence of thrombus in the internal jugular vein, subclavian vein, and the brachiocephalic vein  LEFT UPPER LIMB: Evidence of superficial thrombophlebitis noted in the cephalic vein  No evidence of acute or chronic deep vein thrombosis  Doppler evaluation shows a normal response to augmentation maneuvers    Technically difficult/limited study due to immobile shoulder, pitting edema and poor visualization of deep system  SIGNATURE: Electronically Signed by: Teresa Betancourt MD on 2022-03-10 06:23:16 PM    CTA ED chest PE Study    Result Date: 3/9/2022  Narrative: CTA - CHEST WITH IV CONTRAST - PULMONARY ANGIOGRAM INDICATION:   Shortness of breath  COMPARISON: 3/9/2022  TECHNIQUE: CTA examination of the chest was performed using angiographic technique according to a protocol specifically tailored to evaluate for pulmonary embolism  Axial, sagittal, and coronal 2D reformatted images were created from the source data and  submitted for interpretation  In addition, coronal 3D MIP postprocessing was performed on the acquisition scanner  Radiation dose length product (DLP) for this visit:  684 94 mGy-cm   This examination, like all CT scans performed in the [facility], was performed utilizing techniques to minimize radiation dose exposure, including the use of iterative  reconstruction and automated exposure control  IV Contrast:  85 mL of iohexol (OMNIPAQUE)  FINDINGS: PULMONARY ARTERIAL TREE:  No filling defect in the visualized pulmonary arteries to suggest acute embolus  LUNGS:  Bibasilar atelectasis  Few small airspace opacities scattered throughout the right lung could represent mild aspiration  No pulmonary interstitial edema  There is no tracheal or endobronchial lesion  PLEURA:  Small bilateral pleural effusions  HEART/GREAT VESSELS: Mild cardiomegaly  Aberrant right subclavian artery  No thoracic aortic aneurysm or dissection  MEDIASTINUM AND LUIGI:  No lymphadenopathy  CHEST WALL AND LOWER NECK:   Unremarkable  VISUALIZED STRUCTURES IN THE UPPER ABDOMEN:  Unremarkable  OSSEOUS STRUCTURES:  No acute fracture or destructive osseous lesion  Impression: 1  Small bilateral pleural effusions and bibasilar atelectasis  Few airspace opacities in the right lung could represent mild aspiration   2   No evidence of acute pulmonary embolus, thoracic aortic aneurysm or dissection  Workstation performed: [id number]     [address] bedside procedure    Result Date: 3/1/2022  Narrative: 1 2 [id number] [id number]  5 [id number]  7 [id number]    CT upper extremity wo contrast left    Result Date: 2/17/2022  Narrative: CT left shoulder without IV contrast INDICATION: Fracture, humerus Fracture, shoulder displaced fracture  COMPARISON: None  TECHNIQUE: CT examination of the above was performed  This examination, like all CT scans performed in the [facility], was performed utilizing techniques to minimize radiation dose exposure, including the use of iterative reconstruction  and automated exposure control software  Sagittal and coronal two dimensional reconstructed images were also submitted for interpretation  Rad dose  1299 mGy-cm FINDINGS: OSSEOUS STRUCTURES:  Extensively comminuted fracture at the surgical neck of the left proximal humerus with angulation and overriding of the fracture fragments with distal shaft displaced anterior to the humeral head  Glenohumeral joint appears intact  VISUALIZED MUSCULATURE:  Unremarkable  SOFT TISSUES:  Unremarkable  OTHER PERTINENT FINDINGS:  None  Impression: Extensively comminuted fracture at the surgical neck of left proximal humerus as detailed above  Glenohumeral joint appears intact  Workstation performed: [id number]          Meka Palomino, [address]  Cardiology      "This note has been constructed using a voice recognition system  Therefore there may be syntax, spelling, and/or grammatical errors   Please call if you have any questions  "

## 2022-03-11 NOTE — DISCHARGE SUMMARY
Discharge Summary - Darrell Patel 73 y o  female MRN: [id number]    Unit/Bed#: [address] [place] [address] Encounter: [id number]    Admission Date: 3/1/2022     Discharge Date: 3/7/22    Discharge Diagnosis: Status post left reverse total shoulder replacement  Admitting Diagnosis: Closed fracture of head of left humerus, initial encounter [S42 292A]    Procedures Performed: Left reverse total shoulder replacement on 3/1/22    History and Hospital Course:  Patient underwent elective left reverse total shoulder replacement for fracture on 3/1/2022  She was admitted postop of her medical management, orthopedic observation, and pain control  This was a planned admission due to the complex medical history of this patient  Patient did develop some postoperative anemia, hyperkalemia, as well as elevated creatinine  Please see internal medicine notes for further details regarding medical management of these conditions during her hospital course  Patient had a benign hospital course her regards to her left shoulder  Patient was medically optimized and was discharged on 3/7/2022 back to short-term rehab, where she had been pre-operatively  Complications:  No orthopedic complications  Condition at Discharge: stable     Discharge instructions/Information to patient and family:   See after visit summary for information provided to patient and family  Provisions for Follow-Up Care: Follow-up in 7-10 days with Dr Jose Orosco outpatient  Disposition:  Discharge to short term rehab    Discharge Medications:  See after visit summary for reconciled discharge medications provided to patient and family

## 2022-03-11 NOTE — PLAN OF CARE
Problem: Nutrition/Hydration-ADULT  Goal: Nutrient/Hydration intake appropriate for improving, restoring or maintaining nutritional needs  Description: Monitor and assess patient's nutrition/hydration status for malnutrition  Collaborate with interdisciplinary team and initiate plan and interventions as ordered  Monitor patient's weight and dietary intake as ordered or per policy  Utilize nutrition screening tool and intervene as necessary  Determine patient's food preferences and provide high-protein, high-caloric foods as appropriate       INTERVENTIONS:  - Monitor oral intake, urinary output, labs, and treatment plans  - Assess nutrition and hydration status and recommend course of action  - Evaluate amount of meals eaten  - Assist patient with eating if necessary   - Allow adequate time for meals  - Recommend/ encourage appropriate diets, oral nutritional supplements, and vitamin/mineral supplements  - Order, calculate, and assess calorie counts as needed  - Recommend, monitor, and adjust tube feedings and TPN/PPN based on assessed needs  - Assess need for intravenous fluids  - Provide specific nutrition/hydration education as appropriate  - Include patient/family/caregiver in decisions related to nutrition  Outcome: Progressing     Problem: Prexisting or High Potential for Compromised Skin Integrity  Goal: Skin integrity is maintained or improved  Description: INTERVENTIONS:  - Identify patients at risk for skin breakdown  - Assess and monitor skin integrity  - Assess and monitor nutrition and hydration status  - Monitor labs   - Assess for incontinence   - Turn and reposition patient  - Assist with mobility/ambulation  - Relieve pressure over bony prominences  - Avoid friction and shearing  - Provide appropriate hygiene as needed including keeping skin clean and dry  - Evaluate need for skin moisturizer/barrier cream  - Collaborate with interdisciplinary team   - Patient/family teaching  - Consider wound care consult   Outcome: Progressing     Problem: Potential for Falls  Goal: Patient will remain free of falls  Description: INTERVENTIONS:  - Educate patient/family on patient safety including physical limitations  - Instruct patient to call for assistance with activity   - Consult OT/PT to assist with strengthening/mobility   - Keep Call bell within reach  - Keep bed low and locked with side rails adjusted as appropriate  - Keep care items and personal belongings within reach  - Initiate and maintain comfort rounds  - Make Fall Risk Sign visible to staff  - Offer Toileting every 2 Hours, in advance of need  - Initiate/Maintain bed alarm  - Obtain necessary fall risk management equipment  - Apply yellow socks and bracelet for high fall risk patients  - Consider moving patient to room near nurses station  Outcome: Progressing     Problem: MOBILITY - ADULT  Goal: Maintain or return to baseline ADL function  Description: INTERVENTIONS:  -  Assess patient's ability to carry out ADLs; assess patient's baseline for ADL function and identify physical deficits which impact ability to perform ADLs (bathing, care of mouth/teeth, toileting, grooming, dressing, etc )  - Assess/evaluate cause of self-care deficits   - Assess range of motion  - Assess patient's mobility; develop plan if impaired  - Assess patient's need for assistive devices and provide as appropriate  - Encourage maximum independence but intervene and supervise when necessary  - Involve family in performance of ADLs  - Assess for home care needs following discharge   - Consider OT consult to assist with ADL evaluation and planning for discharge  - Provide patient education as appropriate  Outcome: Progressing  Goal: Maintains/Returns to pre admission functional level  Description: INTERVENTIONS:  - Perform BMAT or MOVE assessment daily    - Set and communicate daily mobility goal to care team and patient/family/caregiver     - Collaborate with rehabilitation services on mobility goals if consulted  - Perform Range of Motion 3 times a day  - Reposition patient every 2 hours    - Dangle patient 3 times a day  - Stand patient 3 times a day  - Ambulate patient 3 times a day  - Out of bed to chair 3 times a day   - Out of bed for meals 3 times a day  - Out of bed for toileting  - Record patient progress and toleration of activity level   Outcome: Progressing     Problem: PAIN - ADULT  Goal: Verbalizes/displays adequate comfort level or baseline comfort level  Description: Interventions:  - Encourage patient to monitor pain and request assistance  - Assess pain using appropriate pain scale  - Administer analgesics based on type and severity of pain and evaluate response  - Implement non-pharmacological measures as appropriate and evaluate response  - Consider cultural and social influences on pain and pain management  - Notify physician/advanced practitioner if interventions unsuccessful or patient reports new pain  Outcome: Progressing     Problem: INFECTION - ADULT  Goal: Absence or prevention of progression during hospitalization  Description: INTERVENTIONS:  - Assess and monitor for signs and symptoms of infection  - Monitor lab/diagnostic results  - Monitor all insertion sites, i e  indwelling lines, tubes, and drains  - Monitor endotracheal if appropriate and nasal secretions for changes in amount and color  - [place] appropriate cooling/warming therapies per order  - Administer medications as ordered  - Instruct and encourage patient and family to use good hand hygiene technique  - Identify and instruct in appropriate isolation precautions for identified infection/condition  Outcome: Progressing     Problem: SAFETY ADULT  Goal: Patient will remain free of falls  Description: INTERVENTIONS:  - Educate patient/family on patient safety including physical limitations  - Instruct patient to call for assistance with activity   - Consult OT/PT to assist with strengthening/mobility   - Keep Call bell within reach  - Keep bed low and locked with side rails adjusted as appropriate  - Keep care items and personal belongings within reach  - Initiate and maintain comfort rounds  - Make Fall Risk Sign visible to staff  - Offer Toileting every 2 Hours, in advance of need  - Initiate/Maintain bed alarm  - Obtain necessary fall risk management equipment  - Apply yellow socks and bracelet for high fall risk patients  - Consider moving patient to room near nurses station  Outcome: Progressing  Goal: Maintain or return to baseline ADL function  Description: INTERVENTIONS:  -  Assess patient's ability to carry out ADLs; assess patient's baseline for ADL function and identify physical deficits which impact ability to perform ADLs (bathing, care of mouth/teeth, toileting, grooming, dressing, etc )  - Assess/evaluate cause of self-care deficits   - Assess range of motion  - Assess patient's mobility; develop plan if impaired  - Assess patient's need for assistive devices and provide as appropriate  - Encourage maximum independence but intervene and supervise when necessary  - Involve family in performance of ADLs  - Assess for home care needs following discharge   - Consider OT consult to assist with ADL evaluation and planning for discharge  - Provide patient education as appropriate  Outcome: Progressing  Goal: Maintains/Returns to pre admission functional level  Description: INTERVENTIONS:  - Perform BMAT or MOVE assessment daily    - Set and communicate daily mobility goal to care team and patient/family/caregiver  - Collaborate with rehabilitation services on mobility goals if consulted  - Perform Range of Motion 3 times a day  - Reposition patient every 2 hours    - Dangle patient 3 times a day  - Stand patient 3 times a day  - Ambulate patient 3 times a day  - Out of bed to chair 3 times a day   - Out of bed for meals 3 times a day  - Out of bed for toileting  - Record patient progress and toleration of activity level   Outcome: Progressing     Problem: DISCHARGE PLANNING  Goal: Discharge to home or other facility with appropriate resources  Description: INTERVENTIONS:  - Identify barriers to discharge w/patient and caregiver  - Arrange for needed discharge resources and transportation as appropriate  - Identify discharge learning needs (meds, wound care, etc )  - Arrange for interpretive services to assist at discharge as needed  - Refer to Case Management Department for coordinating discharge planning if the patient needs post-hospital services based on physician/advanced practitioner order or complex needs related to functional status, cognitive ability, or social support system  Outcome: Progressing     Problem: Knowledge Deficit  Goal: Patient/family/caregiver demonstrates understanding of disease process, treatment plan, medications, and discharge instructions  Description: Complete learning assessment and assess knowledge base    Interventions:  - Provide teaching at level of understanding  - Provide teaching via preferred learning methods  Outcome: Progressing     Problem: RESPIRATORY - ADULT  Goal: Achieves optimal ventilation and oxygenation  Description: INTERVENTIONS:  - Assess for changes in respiratory status  - Assess for changes in mentation and behavior  - Position to facilitate oxygenation and minimize respiratory effort  - Oxygen administered by appropriate delivery if ordered  - Initiate smoking cessation education as indicated  - Encourage broncho-pulmonary hygiene including cough, deep breathe, Incentive Spirometry  - Assess the need for suctioning and aspirate as needed  - Assess and instruct to report SOB or any respiratory difficulty  - Respiratory Therapy support as indicated  Outcome: Progressing

## 2022-03-11 NOTE — CASE MANAGEMENT
Case Management Discharge Planning Note    Patient name Diane Serna [address] [facility] [address] [place] 12-* MRN [id number]  : 1950 Date 3/11/2022       Current Admission Date: 3/9/2022  Current Admission Diagnosis:Acute diastolic congestive heart failure [facility])   Patient Active Problem List    Diagnosis Date Noted    Acute diastolic congestive heart failure ([facility] ) 03/10/2022    Shortness of breath 2022    Status post reverse total replacement of left shoulder 2022    Constipation 2022    Closed 4-part fracture of proximal humerus, left, initial encounter 2022    PONV (postoperative nausea and vomiting) 2022    Acute renal failure superimposed on chronic kidney disease ([facility] ) 2022    Diabetes mellitus, type 2 ([facility] ) 2022    Gastroesophageal reflux disease 2022    Nephrolithiasis 2022    Arthritis 2022    S/P total knee replacement, right 2022    On continuous oral anticoagulation - eliquis 2022    Humeral head fracture 2022    Essential hypertension 2019    Anemia 2019    Mixed hyperlipidemia 2019    Paroxysmal atrial fibrillation ([facility] ) 2019    Lower leg edema 2019    Asthma 2018    Morbid obesity with BMI of 45 0-49 9, adult ([facility] ) 2018      LOS (days): 2  Geometric Mean LOS (GMLOS) (days): 3 80  Days to GMLOS:2 1     OBJECTIVE:  Risk of Unplanned Readmission Score: 30   Current admission status: Inpatient   Preferred Pharmacy:   [id number] [facility],[address] , [address] [place] [id number]  Phone: [phone] Fax: [phone]    Primary Care Provider: Ute Mancuso    Primary Insurance: MEDICARE  Secondary Insurance: CIGIVY    DISCHARGE DETAILS:  Discharge planning discussed with[de-identified] Patient and her niece Marcello Chang [phone]  [place] of Choice: Yes  Comments - [place] of Choice: [facility] (accepted)  CM contacted family/caregiver?: Yes  Were Treatment Team discharge recommendations reviewed with patient/caregiver?: Yes     Were patient/caregiver advised of the risks associated with not following Treatment Team discharge recommendations?: Yes    Contacts  Patient Contacts: Jonnathan Alves  Relationship to Patient[de-identified] Family (niece/lives with)  Contact Method: Phone  Phone Number: [phone]  Reason/Outcome: Emergency Contact,Discharge Planning    Other Referral/Resources/Interventions Provided:  Interventions: [facility]    Would you like to participate in our [address] service program?  : No - Declined    Treatment Team Recommendation: Home with [address]  Discharge Destination Plan[de-identified] Home with Karinaraúlalisa at Discharge : Family,Automobile     IMM Given (Date):: 03/11/22  IMM Given to[de-identified] Patient (IMM was discussed with patient and she states understanding )     CM met with patient and her niece Lisa Noguera and discussed anticipated dc for tomorrow with niece and [facility]  Confirmed patient will be moving in with her at dc  She also has all DME in place including a hospital bed, BSC, walker and a wc  She is picking up a shower chair today  [facility]

## 2022-03-11 NOTE — ASSESSMENT & PLAN NOTE
- On arrival rapid a fib with RVR responded to IV lopressor - continue 5 mg IV prn   - continue metoprolol tartrate 50 mg PO BID at home   - DEMETRIS Vasc score 3   - Dr Carmen Lopez cleared to resume Eliquis   - as per cardiology note for pre op clearance 3/1/22: Patient typically has episodes of paroxysmal SVT & a fib was only in past - as per note - if no recurrence then consideration for dc Eliquis - cardiology consulted    - tele monitoring

## 2022-03-11 NOTE — ASSESSMENT & PLAN NOTE
Wt Readings from Last 3 Encounters:   03/11/22 124 kg (273 lb 6 4 oz)   03/08/22 135 kg (297 lb 9 9 oz)   03/03/22 127 kg (279 lb 15 8 oz)     - SOB POA, secondary to acute on chronic diastolic CHF  BNP 5992   CT shows small b/l plerual effusions   - recent shoulder surgery, SEBASTIAN, not on diuretics at SNF (previously on torsemide 10mg/d)  - follow up repeat Echo - previously LVEF 55%  - pro calcitonin negative - would hold off abx for aspiration related air space opacities seen on CT    - CTA chest negative for pulmonary embolism   - continue Lasix IV 40mg BID, metoprolol  - daily weight, I/O, CHF diet   - cardiology evaluation appreciated

## 2022-03-11 NOTE — CASE MANAGEMENT
Case Management Discharge Planning Note    Patient name Stacy Salvador  Location [address] [facility] 401/[address] [place] 12-* MRN [id number]  : 1950 Date 3/11/2022       Current Admission Date: 3/9/2022  Current Admission Diagnosis:Acute diastolic congestive heart failure [facility])   Patient Active Problem List    Diagnosis Date Noted    Acute diastolic congestive heart failure ([facility] ) 03/10/2022    Shortness of breath 2022    Status post reverse total replacement of left shoulder 2022    Constipation 2022    Closed 4-part fracture of proximal humerus, left, initial encounter 2022    PONV (postoperative nausea and vomiting) 2022    Acute renal failure superimposed on chronic kidney disease ([facility] ) 2022    Diabetes mellitus, type 2 ([place] [address] ) 2022    Gastroesophageal reflux disease 2022    Nephrolithiasis 2022    Arthritis 2022    S/P total knee replacement, right 2022    On continuous oral anticoagulation - eliquis 2022    Humeral head fracture 2022    Essential hypertension 2019    Anemia 2019    Mixed hyperlipidemia 2019    Paroxysmal atrial fibrillation ([facility] ) 2019    Lower leg edema 2019    Asthma 2018    Morbid obesity with BMI of 45 0-49 9, adult ([facility] ) 2018      LOS (days): 2  Geometric Mean LOS (GMLOS) (days): 3 80  Days to GMLOS:2     OBJECTIVE:  Risk of Unplanned Readmission Score: 30         Current admission status: Inpatient   Preferred Pharmacy:   [address] [facility],[address] , [address] [place] [id number]  Phone: [phone] Fax: [phone]    Primary Care Provider: Lucia Monterroso    Primary Insurance: MEDICARE  Secondary Insurance: CIGIVY    DISCHARGE DETAILS:    Discharge planning discussed with[de-identified] Patient and her sister Julius Gee of Choice: Yes     Patient has a follow up with Dr Melissa Preston office Monday 3/14/22 at 10:45 AM with his PA Marygrace Boast  This was provided to patient and her sister Pipo Alycia

## 2022-03-11 NOTE — OCCUPATIONAL THERAPY NOTE
OT TREATMENT       03/11/22 1340   Note Type   Note Type Treatment   Restrictions/Precautions   LUE Weight Bearing Per Order NWB   Braces or Orthoses Sling  (L shoulder without abduction pillow, Dr Iris Veloz removed)   Other Precautions Chair Alarm; Bed Alarm; Fall Risk;O2  (no AROM L shoulder)   General   Family/Caregiver Present Niece Ines   Pain Assessment   Pain Assessment Tool 0-10   Pain Score 10 - Worst Possible Pain   Pain Location/Orientation Orientation: Left; Location: Shoulder  (nurse notified )   ADL   UB Dressing Assistance 2  Maximal Assistance   UB Dressing Deficit Thread LUE; Thread RUE;Pull over head;Pull down in back   UB Dressing Comments Niece educated on how to wendi/doff sling and UB/LB dressing  Niece demonstrated understanding and all questions answered  Patient requires max assist for UB dressing   LB Dressing Assistance 3  Moderate Assistance   LB Dressing Deficit Thread LLE into underwear; Thread RLE into underwear; Thread LLE into pants; Thread RLE into pants;Pull up over hips   LB Dressing Comments Niece assisted patient and educated on technique, demonstrated understanding   Toileting Assistance  2  Maximal Assistance   Toileting Deficit Clothing management up;Clothing management down;Perineal hygiene   Toileting Comments urination and BM on toilet, max assist for hygiene    Transfers   Sit to Stand 5  Supervision   Stand to Sit 5  Supervision   Toilet transfer 4  Minimal assistance   Additional items Standard toilet   Functional Mobility   Functional Mobility 4  Minimal assistance   Additional Comments 15 feet x 2, to and from bathroom    Additional items SPC   ROM - Left Upper Extremities    L Elbow Elbow flexion;Elbow extension;AAROM   L Wrist AROM; Wrist flexion;Wrist extension   L Hand AROM; Thumb; Index finger; Long finger;Ring finger;Little finger   L Weight/Reps/Sets 10 times each seated in chair    Assessment   Assessment Patient seen for OT treatment    Patient and niece educated on dressing and ADLS  Niece demonstrated understanding of dressing and donning/doffing sling  Patient requires max assist for ADLS at this time due to L arm immobilizer and patient weakness  Patients plan to return home with niece  Patients niece demonstrated and verbalized understanding  Patient will benefit from continued OT services to maximize functional performance with ADLS  The patient's raw score on the AM-PAC Daily Activity inpatient short form is 16, standardized score is 35 96, less than 39 4  Patients at this level are likely to benefit from DC to post-acute rehabilitation services  Please refer to the recommendation of the Occupational Therapist for safe DC planning  Plan   Treatment Interventions ADL retraining;Functional transfer training;UE strengthening/ROM; Endurance training;Patient/family training;Equipment evaluation/education; Activityengagement; Compensatory technique education   OT Frequency 5x/wk   Recommendation   OT Discharge Recommendation Home with home health rehabilitation   AM-PAC Daily Activity Inpatient   Lower Body Dressing 2   Bathing 2   Toileting 2   Upper Body Dressing 1   Grooming 4   Eating 4   Daily Activity Raw Score 15   Daily Activity Standardized Score (Calc for Raw Score >=11) 34 69   AM-PAC Applied Cognition Inpatient   Following a Speech/Presentation 4   Understanding Ordinary Conversation 4   Taking Medications 4   Remembering Where Things Are Placed or Put Away 4   Remembering List of 4-5 Errands 4   Taking Care of Complicated Tasks 4   Applied Cognition Raw Score 24   Applied Cognition Standardized Score 48 67   Licensure   [place] License Number  Keeley Alvarado Jack Chacho 87 OTR/L [id number]

## 2022-03-11 NOTE — PHYSICAL THERAPY NOTE
PT TREATMENT     03/11/22 1007   Note Type   Note Type Treatment   Pain Assessment   Pain Assessment Tool 0-10   Pain Score 5   Pain Location/Orientation   (L shoulder)   Restrictions/Precautions   LUE Weight Bearing Per Order NWB   Braces or Orthoses Sling   General   Chart Reviewed Yes   Cognition   Overall Cognitive Status WFL   Subjective   Subjective "feeling better, ready to go to my niece's house"   Transfers   Sit to Stand 5  Supervision   Stand to Sit 5  Supervision   Stand pivot 5  Supervision   Ambulation/Elevation   Gait pattern Wide ONUR   Gait Assistance 5  Supervision   Assistive Device   (vin walker, none)   Distance 50 feet each with change in direction   Balance   Static Sitting Fair +   Dynamic Sitting Fair   Static Standing Fair   Dynamic Standing Fair   Ambulatory Fair   Activity Tolerance   Activity Tolerance Patient tolerated treatment well;Patient limited by fatigue   Assessment   Prognosis Good   Problem List Decreased strength;Decreased endurance; Impaired balance;Decreased mobility;Pain;Orthopedic restrictions; Obesity   Assessment Pt demonstrates improving endurance and function s/p recent L reverse TSA  Pt is able to ambulate with or without a hemiwalker house hold distances with supervision  Discussed using a cane to ambulate at home if needed as pt feels the hemiwalker 'gets in the way'  The patient's AM-PAC Basic Mobility Inpatient Short Form Raw Score is 16  A Raw score of less than or equal to 16 suggests the patient may benefit from discharge to post-acute rehabilitation services, however pt is safe to go home with her niece with 24 hour supervision  Plan   Treatment/Interventions ADL retraining;Functional transfer training;LE strengthening/ROM; Elevations; Therapeutic exercise; Endurance training;Gait training;Bed mobility; Equipment eval/education;Patient/family training;Spoke to case management   Progress Progressing toward goals   PT Frequency Other (Comment)  (5w) Recommendation   PT Discharge Recommendation Home with home health rehabilitation   Equipment Recommended   (pt has a cane, WC, hospital bed)   AM-PAC Basic Mobility Inpatient   Turning in Bed Without Bedrails 2   Lying on Back to Sitting on Edge of Flat Bed 2   Moving Bed to Chair 3   Standing Up From Chair 3   Walk in Room 3   Climb 3-5 Stairs 3   Basic Mobility Inpatient Raw Score 16   Basic Mobility Standardized Score 38 32   Highest Level Of Mobility   JH-HL Goal 5: Stand one or more mins   JH-HLM Highest Level of Mobility 7: Walk 25 feet or more   JH-HLM Goal Achieved Yes   End of Consult   Patient Position at End of Consult Bedside chair; All needs within reach   [facility] License Number  Toño Wheeler PT [id number]

## 2022-03-11 NOTE — PROGRESS NOTES
Nick [address]  Progress Note Kuldeep James 1950, 73 y o  female MRN: [id number]  Unit/Bed#: [address] [place] [address] Encounter: [id number]  Primary Care Provider: Francisco Jones   Date and time admitted to hospital: 3/9/2022  4:26 PM    * Acute diastolic congestive heart failure [facility])  Assessment & Plan  Wt Readings from Last 3 Encounters:   03/11/22 124 kg (273 lb 6 4 oz)   03/08/22 135 kg (297 lb 9 9 oz)   03/03/22 127 kg (279 lb 15 8 oz)     - SOB POA, secondary to acute on chronic diastolic CHF  BNP 5992  CT shows small b/l plerual effusions   - recent shoulder surgery, SEBASTIAN, not on diuretics at SNF (previously on torsemide 10mg/d)  - follow up repeat Echo - previously LVEF 55%  - pro calcitonin negative - would hold off abx for aspiration related air space opacities seen on CT    - CTA chest negative for pulmonary embolism   - continue Lasix IV 40mg BID, metoprolol  - daily weight, I/O, CHF diet   - cardiology evaluation appreciated     Status post reverse total replacement of left shoulder  Assessment & Plan  - s/p fall resulting in Comminuted displaced fracture of the proximal humerus (2/23/22)    - s/p left shoulder reverse complete arthroplasty (3/1/22)   - Cleared from ortho standpoint for patient to start Eliquis   - LUE venous duplex is negative for DVT  - PT / OT consults appreciated     Paroxysmal atrial fibrillation ([facility] )  Assessment & Plan  - On arrival rapid a fib with RVR responded to IV lopressor - continue 5 mg IV prn   - continue metoprolol tartrate 50 mg PO BID at home   - DEMETRIS Vasc score 3   - Dr Lazarus Riis cleared to resume Eliquis   - as per cardiology note for pre op clearance 3/1/22: Patient typically has episodes of paroxysmal SVT & a fib was only in past - as per note - if no recurrence then consideration for dc Eliquis - cardiology consulted    - tele monitoring     Diabetes mellitus, type 2 ([facility] )  Assessment & Plan  Lab Results   Component Value Date    HGBA1C 6 8 (H) 2022     Recent Labs     03/10/22  1120 03/10/22  1631 03/10/22  2006 03/11/22  0656   POCGLU 158* 184* 167* 105     Blood Sugar Average: Last 72 hrs:  (P) 064 [id number]   - on Lantus 20 units SQ daily   - Insulin Sliding scale   - A1c indicated good control     Essential hypertension  Assessment & Plan  - on lopressor PO    Anemia  Assessment & Plan  - Hgb stable  - likely worsened secondary to post op loss     Mixed hyperlipidemia  Assessment & Plan  - on rosuvastatin    Morbid obesity with BMI of 45 0-49 9, adult ([facility] )  Assessment & Plan  - counseled weight loss   - sleep apnea   - refusing CPAP     VTE Pharmacologic Prophylaxis: VTE Score: 17 High Risk (Score >/= 5) - Pharmacological DVT Prophylaxis Ordered: apixaban (Eliquis)  Sequential Compression Devices Ordered  Patient Centered Rounds: I performed bedside rounds with nursing staff today  Discussions with Specialists or Other Care Team Provider: nursing     Education and Discussions with Family / Patient: Patient declined call to   Time Spent for Care: 30 minutes  More than 50% of total time spent on counseling and coordination of care as described above  Current Length of Stay: 2 day(s)  Current Patient Status: Inpatient   Certification Statement: The patient will continue to require additional inpatient hospital stay due to pending improvement in volume overload on IV lasix   Discharge Plan: Anticipate discharge tomorrow to home with home services  Code Status: Level 1 - Full Code    Subjective:   Pt seen and examined at bedside  Notes dec urination today  Feels overall improved  Just walked in the hallway  No cp and sob minimal  Tramadol helps her left shoulder pain       Objective:     Vitals:   Temp (24hrs), Av °F (36 7 °C), Min:97 8 °F (36 6 °C), Max:98 2 °F (36 8 °C)    Temp:  [97 8 °F (36 6 °C)-98 2 °F (36 8 °C)] 98 °F (36 7 °C)  HR:  [73-88] 86  Resp:  [18-19] 18  BP: (134-154)/(60-80) 134/61  SpO2:  [92 %-98 %] 92 %  Body mass index is 50 01 kg/m²  Input and Output Summary (last 24 hours): Intake/Output Summary (Last 24 hours) at 3/11/2022 1048  Last data filed at 3/11/2022 0830  Gross per 24 hour   Intake 1270 ml   Output 1850 ml   Net -580 ml       Physical Exam:   Physical Exam  Constitutional:       Appearance: Normal appearance  She is obese  She is not ill-appearing  HENT:      Head: Normocephalic and atraumatic  Mouth/Throat:      Mouth: Mucous membranes are moist    Eyes:      Extraocular Movements: Extraocular movements intact  Cardiovascular:      Rate and Rhythm: Normal rate and regular rhythm  Pulmonary:      Effort: Pulmonary effort is normal       Breath sounds: Normal breath sounds  No wheezing, rhonchi or rales  Abdominal:      General: Abdomen is flat  Palpations: Abdomen is soft  Musculoskeletal:         General: Swelling (pedal edema b/l) present  Normal range of motion  Cervical back: Normal range of motion and neck supple  Comments: Left shoulder sling in place  Incision c/d/i    Skin:     General: Skin is warm and dry  Neurological:      General: No focal deficit present  Mental Status: She is alert and oriented to person, place, and time     Psychiatric:         Mood and Affect: Mood normal          Behavior: Behavior normal         Additional Data:     Labs:  Results from last 7 days   Lab Units 03/10/22  0513   WBC Thousand/uL 6 19   HEMOGLOBIN g/dL 8 9*   HEMATOCRIT % 30 9*   PLATELETS Thousands/uL 300   NEUTROS PCT % 54   LYMPHS PCT % 25   MONOS PCT % 15*   EOS PCT % 4     Results from last 7 days   Lab Units 03/10/22  0513   SODIUM mmol/L 146*   POTASSIUM mmol/L 4 2   CHLORIDE mmol/L 107   CO2 mmol/L 32   BUN mg/dL 30*   CREATININE mg/dL 1 24   ANION GAP mmol/L 7   CALCIUM mg/dL 8 2*   ALBUMIN g/dL 2 5*   TOTAL BILIRUBIN mg/dL 0 32   ALK PHOS U/L 79   ALT U/L 18   AST U/L 14   GLUCOSE RANDOM mg/dL 130     Results from last 7 days   Lab Units 03/10/22  0513   INR  1 34*     Results from last 7 days   Lab Units 03/11/22  0656 03/10/22  2006 03/10/22  1631 03/10/22  1120 03/10/22  0729 03/09/22  2229 03/09/22  6427 03/07/22  1058 03/07/22  0736 03/07/22  0659 03/06/22  2339 03/06/22  2225   POC GLUCOSE mg/dl 105 167* 184* 158* 121 152* 135 89 68 50* 108 74     Results from last 7 days   Lab Units 03/09/22  1830   HEMOGLOBIN A1C % 6 8*     Results from last 7 days   Lab Units 03/10/22  0513 03/09/22  2106   PROCALCITONIN ng/ml 0 08 0 08       Lines/Drains:  Invasive Devices  Report    Peripheral Intravenous Line            Peripheral IV 03/09/22 Right;Ventral (anterior) Forearm 1 day                      Imaging: Reviewed radiology reports from this admission including: chest xray    Recent Cultures (last 7 days):   Results from last 7 days   Lab Units 03/10/22  0349 03/09/22 2126 03/09/22  2106   BLOOD CULTURE   --  No Growth at 24 hrs  No Growth at 24 hrs     URINE CULTURE  <10,000 cfu/ml   --   --        Last 24 Hours Medication List:   Current Facility-Administered Medications   Medication Dose Route Frequency Provider Last Rate    albuterol  2 puff Inhalation Q6H PRN Christian Santiago MD      aluminum-magnesium hydroxide-simethicone  30 mL Oral Q6H PRN Christian Santiago MD      amLODIPine  5 mg Oral Daily Christian Santiago MD      ammonium lactate   Topical BID Christian Santiago MD      apixaban  5 mg Oral BID Christian Santiago MD      ferric gluconate  125 mg Intravenous Daily Christian Santiago MD      furosemide  40 mg Intravenous BID (diuretic) RK Sanchez      insulin glargine  20 Units Subcutaneous HS Christian Santiago MD      insulin lispro  1-5 Units Subcutaneous TID [facility] Christian Santiago MD      insulin lispro  1-5 Units Subcutaneous HS Christian Santiago MD      lidocaine  2 patch Topical Daily Christian Santiago MD      loratadine  10 mg Oral Daily Christian Santiago MD      magnesium hydroxide  30 mL Oral Daily PRN Christian Santiago MD      metoprolol  5 mg Intravenous Q6H PRN Hodan Abts Ceci Wiggins MD      metoprolol tartrate  50 mg Oral Q12H [facility] MD Cassius      montelukast  10 mg Oral HS Travis Last MD      nitroglycerin  0 4 mg Sublingual Q5 Min PRN Travis Last MD      nystatin   Topical BID Travis Last MD      ondansetron  4 mg Intravenous Q6H PRN Travis Last MD      pravastatin  80 mg Oral Daily With Krystin Urrutia MD      pregabalin  100 mg Oral BID Travis Last MD      [facility] ON 3/12/2022] torsemide  20 mg Oral Daily RK Murray      traMADol  50 mg Oral Q6H PRN Freddy Rm PA-C          Today, Patient Was Seen By: Tacos Beaver PA-C    **Please Note: This note may have been constructed using a voice recognition system  **

## 2022-03-11 NOTE — ASSESSMENT & PLAN NOTE
Lab Results   Component Value Date    HGBA1C 6 8 (H) 03/09/2022     Recent Labs     03/10/22  1120 03/10/22  1631 03/10/22  2006 03/11/22  0656   POCGLU 158* 184* 167* 105     Blood Sugar Average: Last 72 hrs:  (P) 170 [id number]   - on Lantus 20 units SQ daily   - Insulin Sliding scale   - A1c indicated good control

## 2022-03-11 NOTE — ASSESSMENT & PLAN NOTE
- s/p fall resulting in Comminuted displaced fracture of the proximal humerus (2/23/22)    - s/p left shoulder reverse complete arthroplasty (3/1/22)   - Cleared from ortho standpoint for patient to start Eliquis   - LUE venous duplex is negative for DVT  - PT / OT consults appreciated

## 2022-03-12 LAB
ANION GAP SERPL CALCULATED.3IONS-SCNC: 4 MMOL/L (ref 4–13)
BUN SERPL-MCNC: 32 MG/DL (ref 5–25)
CALCIUM SERPL-MCNC: 7.6 MG/DL (ref 8.3–10.1)
CHLORIDE SERPL-SCNC: 106 MMOL/L (ref 100–108)
CO2 SERPL-SCNC: 34 MMOL/L (ref 21–32)
CREAT SERPL-MCNC: 1.78 MG/DL (ref 0.6–1.3)
GFR SERPL CREATININE-BSD FRML MDRD: 28 ML/MIN/1.73SQ M
GLUCOSE SERPL-MCNC: 112 MG/DL (ref 65–140)
GLUCOSE SERPL-MCNC: 191 MG/DL (ref 65–140)
GLUCOSE SERPL-MCNC: 249 MG/DL (ref 65–140)
GLUCOSE SERPL-MCNC: 63 MG/DL (ref 65–140)
GLUCOSE SERPL-MCNC: 91 MG/DL (ref 65–140)
POTASSIUM SERPL-SCNC: 4.3 MMOL/L (ref 3.5–5.3)
SODIUM SERPL-SCNC: 144 MMOL/L (ref 136–145)

## 2022-03-12 PROCEDURE — 80048 BASIC METABOLIC PNL TOTAL CA: CPT | Performed by: NURSE PRACTITIONER

## 2022-03-12 PROCEDURE — 94761 N-INVAS EAR/PLS OXIMETRY MLT: CPT

## 2022-03-12 PROCEDURE — 82948 REAGENT STRIP/BLOOD GLUCOSE: CPT

## 2022-03-12 PROCEDURE — 99232 SBSQ HOSP IP/OBS MODERATE 35: CPT | Performed by: NURSE PRACTITIONER

## 2022-03-12 RX ADMIN — APIXABAN 5 MG: 5 TABLET, FILM COATED ORAL at 17:02

## 2022-03-12 RX ADMIN — AMLODIPINE BESYLATE 5 MG: 5 TABLET ORAL at 08:18

## 2022-03-12 RX ADMIN — LORATADINE 10 MG: 10 TABLET ORAL at 08:13

## 2022-03-12 RX ADMIN — Medication: at 17:04

## 2022-03-12 RX ADMIN — INSULIN GLARGINE 20 UNITS: 100 INJECTION, SOLUTION SUBCUTANEOUS at 21:39

## 2022-03-12 RX ADMIN — PREGABALIN 100 MG: 100 CAPSULE ORAL at 17:02

## 2022-03-12 RX ADMIN — SODIUM FERRIC GLUCONATE COMPLEX 125 MG: 12.5 INJECTION INTRAVENOUS at 08:21

## 2022-03-12 RX ADMIN — PREGABALIN 100 MG: 100 CAPSULE ORAL at 08:11

## 2022-03-12 RX ADMIN — METOPROLOL TARTRATE 50 MG: 50 TABLET, FILM COATED ORAL at 08:18

## 2022-03-12 RX ADMIN — MONTELUKAST 10 MG: 10 TABLET, FILM COATED ORAL at 21:40

## 2022-03-12 RX ADMIN — NYSTATIN 1 APPLICATION: 100000 POWDER TOPICAL at 17:04

## 2022-03-12 RX ADMIN — APIXABAN 5 MG: 5 TABLET, FILM COATED ORAL at 08:13

## 2022-03-12 RX ADMIN — NYSTATIN: 100000 POWDER TOPICAL at 08:23

## 2022-03-12 RX ADMIN — INSULIN LISPRO 2 UNITS: 100 INJECTION, SOLUTION INTRAVENOUS; SUBCUTANEOUS at 21:39

## 2022-03-12 RX ADMIN — METOPROLOL TARTRATE 50 MG: 50 TABLET, FILM COATED ORAL at 21:41

## 2022-03-12 RX ADMIN — PRAVASTATIN SODIUM 80 MG: 80 TABLET ORAL at 17:02

## 2022-03-12 RX ADMIN — LIDOCAINE 5% 2 PATCH: 700 PATCH TOPICAL at 08:11

## 2022-03-12 RX ADMIN — TORSEMIDE 20 MG: 20 TABLET ORAL at 08:18

## 2022-03-12 RX ADMIN — INSULIN LISPRO 1 UNITS: 100 INJECTION, SOLUTION INTRAVENOUS; SUBCUTANEOUS at 17:00

## 2022-03-12 RX ADMIN — Medication: at 08:22

## 2022-03-12 NOTE — CASE MANAGEMENT
Case Management Discharge Planning Note    Patient name Aleyda Lovett  Location [address] [facility] [address] Mane Garg 12-* MRN [id number]  : 1950 Date 3/12/2022       Current Admission Date: 3/9/2022  Current Admission Diagnosis:Acute diastolic congestive heart failure [facility])   Patient Active Problem List    Diagnosis Date Noted    Acute diastolic congestive [facility] failure ([facility] ) 03/10/2022    Shortness of breath 2022    Status post reverse total replacement of left shoulder 2022    Constipation 2022    Closed 4-part fracture of proximal humerus, left, initial encounter 2022    PONV (postoperative nausea and vomiting) 2022    Acute renal failure superimposed on chronic kidney disease ([facility] ) 2022    Diabetes mellitus, type 2 ([place] [address] ) 2022    Gastroesophageal reflux disease 2022    Nephrolithiasis 2022    Arthritis 2022    S/P total knee replacement, right 2022    On continuous oral anticoagulation - eliquis 2022    Humeral head fracture 2022    Essential hypertension 2019    Anemia 2019    Mixed hyperlipidemia 2019    Paroxysmal atrial fibrillation ([facility] ) 2019    Lower leg edema 2019    Asthma 2018    Morbid obesity with BMI of 45 0-49 9, adult ([facility] ) 2018      LOS (days): 3  Geometric Mean LOS (GMLOS) (days): 3 80  Days to GMLOS:1     OBJECTIVE:  Risk of Unplanned Readmission Score: 32         Current admission status: Inpatient   Preferred Pharmacy:   [address] [facility],[address] , [address] [place] [id number]  Phone: [phone] Fax: [phone]    Primary Care Provider: Dana Santana    Primary Insurance: MEDICARE  Secondary Insurance: ISABEL    DISCHARGE DETAILS:    Discharge planning discussed with[de-identified] Patient and carter Christian Felty of Choice: Yes  Comments - Freedom of Choice: CM met with patient and delivered portable O2 to bedside  Confirmed dc plan for patient to dc home with carter Irvin and [facility]  She states she already spoke with [employer] and they are arranging delivery for tomorrow  Confirmed patient will be transported home by carter tomorrow    CM contacted family/caregiver?: Yes  Were Treatment Team discharge recommendations reviewed with patient/caregiver?: Yes  Did patient/caregiver verbalize understanding of patient care needs?: Yes  Were patient/caregiver advised of the risks associated with not following Treatment Team discharge recommendations?: Yes    Other Referral/Resources/Interventions Provided:  Interventions: HHC,DME    Would you like to participate in our [address] service program?  : No - Declined    [facility]  Discharge Destination Plan[de-identified] Home with Afsaneh at Discharge : [facility]

## 2022-03-12 NOTE — PLAN OF CARE
Problem: Nutrition/Hydration-ADULT  Goal: Nutrient/Hydration intake appropriate for improving, restoring or maintaining nutritional needs  Description: Monitor and assess patient's nutrition/hydration status for malnutrition  Collaborate with interdisciplinary team and initiate plan and interventions as ordered  Monitor patient's weight and dietary intake as ordered or per policy  Utilize nutrition screening tool and intervene as necessary  Determine patient's food preferences and provide high-protein, high-caloric foods as appropriate       INTERVENTIONS:  - Monitor oral intake, urinary output, labs, and treatment plans  - Assess nutrition and hydration status and recommend course of action  - Evaluate amount of meals eaten  - Assist patient with eating if necessary   - Allow adequate time for meals  - Recommend/ encourage appropriate diets, oral nutritional supplements, and vitamin/mineral supplements  - Order, calculate, and assess calorie counts as needed  - Recommend, monitor, and adjust tube feedings and TPN/PPN based on assessed needs  - Assess need for intravenous fluids  - Provide specific nutrition/hydration education as appropriate  - Include patient/family/caregiver in decisions related to nutrition  Outcome: Progressing     Problem: Prexisting or High Potential for Compromised Skin Integrity  Goal: Skin integrity is maintained or improved  Description: INTERVENTIONS:  - Identify patients at risk for skin breakdown  - Assess and monitor skin integrity  - Assess and monitor nutrition and hydration status  - Monitor labs   - Turn and reposition patient  - Assist with mobility/ambulation  - Relieve pressure over bony prominences  - Avoid friction and shearing  - Provide appropriate hygiene as needed including keeping skin clean and dry  - Evaluate need for skin moisturizer/barrier cream  - Collaborate with interdisciplinary team   - Patient/family teaching    Outcome: Progressing     Problem: Potential for Falls  Goal: Patient will remain free of falls  Description: INTERVENTIONS:  - Educate patient/family on patient safety including physical limitations  - Instruct patient to call for assistance with activity   - Consult OT/PT to assist with strengthening/mobility   - Keep Call bell within reach  - Keep bed low and locked with side rails adjusted as appropriate  - Keep care items and personal belongings within reach  - Initiate and maintain comfort rounds  - Make Fall Risk Sign visible to staff    - Initiate/Maintain alarm  - Obtain necessary fall risk management equipment:   - Apply yellow socks and bracelet for high fall risk patients  - Consider moving patient to room near nurses station  Outcome: Progressing     Problem: MOBILITY - ADULT  Goal: Maintain or return to baseline ADL function  Description: INTERVENTIONS:  - Educate patient/family on patient safety including physical limitations  - Instruct patient to call for assistance with activity   - Consult OT/PT to assist with strengthening/mobility   - Keep Call bell within reach  - Keep bed low and locked with side rails adjusted as appropriate  - Keep care items and personal belongings within reach  - Initiate and maintain comfort rounds  - Make Fall Risk Sign visible to staff    - Apply yellow socks and bracelet for high fall risk patients  - Consider moving patient to room near nurses station  Outcome: Progressing  Goal: Maintains/Returns to pre admission functional level  Description: INTERVENTIONS:  - Perform BMAT or MOVE assessment daily    - Set and communicate daily mobility goal to care team and patient/family/caregiver  - Collaborate with rehabilitation services on mobility goals if consulted  - Perform Range of Motion TID times a day  - Reposition patient every 2 hours    - Dangle patient  tid times a day  - Stand patient tid  times a day  - Ambulate patient times a day  - Out of bed to chair TID times a day   - Out of bed for meals TID  times a day  - Out of bed for toileting  - Record patient progress and toleration of activity level   Outcome: Progressing     Problem: PAIN - ADULT  Goal: Verbalizes/displays adequate comfort level or baseline comfort level  Description: Interventions:  - Encourage patient to monitor pain and request assistance  - Assess pain using appropriate pain scale  - Administer analgesics based on type and severity of pain and evaluate response  - Implement non-pharmacological measures as appropriate and evaluate response  - Consider cultural and social influences on pain and pain management  - Notify physician/advanced practitioner if interventions unsuccessful or patient reports new pain  Outcome: Progressing

## 2022-03-12 NOTE — ASSESSMENT & PLAN NOTE
Lab Results   Component Value Date    HGBA1C 6 8 (H) 03/09/2022     Recent Labs     03/11/22  1618 03/11/22  1955 03/12/22  0708 03/12/22  1057   POCGLU 157* 202* 63* 112     Blood Sugar Average: Last 72 hrs:  (P) 146 [id number]   - on Lantus 20 units SQ daily   - Insulin Sliding scale   - A1c indicated good control

## 2022-03-12 NOTE — ASSESSMENT & PLAN NOTE
- On arrival rapid a fib with RVR responded to IV lopressor - continue 5 mg IV prn   - continue metoprolol tartrate 50 mg PO BID at home   - DEMETRIS Vasc score 3   - Dr Jose Orosco cleared to resume Eliquis   - as per cardiology note for pre op clearance 3/1/22: Patient typically has episodes of paroxysmal SVT & a fib was only in past - as per note - if no recurrence then consideration for dc Eliquis - cardiology consulted    Continue Eliquis 5 mg p o  B i d

## 2022-03-12 NOTE — CASE MANAGEMENT
Case Management Discharge Planning Note    Patient name Andree Padron  Location [address] [facility] 401/[address] Shea Voss 12-* MRN [id number]  : 1950 Date 3/12/2022       Current Admission Date: 3/9/2022  Current Admission Diagnosis:Acute diastolic congestive heart failure [facility])   Patient Active Problem List    Diagnosis Date Noted    Acute diastolic congestive heart failure ([facility] ) 03/10/2022    Shortness of breath 2022    Status post reverse total replacement of left shoulder 2022    Constipation 2022    Closed 4-part fracture of proximal humerus, left, initial encounter 2022    PONV (postoperative nausea and vomiting) 2022    Acute renal failure superimposed on chronic kidney disease ([facility] ) 2022    Diabetes mellitus, type 2 ([facility] ) 2022    Gastroesophageal reflux disease 2022    Nephrolithiasis 2022    Arthritis 2022    S/P total knee replacement, right 2022    On continuous oral anticoagulation - eliquis 2022    Humeral head fracture 2022    Essential hypertension 2019    Anemia 2019    Mixed hyperlipidemia 2019    Paroxysmal atrial fibrillation ([facility] ) 2019    Lower leg edema 2019    Asthma 2018    Morbid obesity with BMI of 45 0-49 9, adult ([facility] ) 2018      LOS (days): 3  Geometric Mean LOS (GMLOS) (days): 3 80  Days to GMLOS:1 2     OBJECTIVE:  Risk of Unplanned Readmission Score: 32   Current admission status: Inpatient   Preferred Pharmacy:   [address] [place] [address],[facility], [address] [place] [id number]  Phone: [phone] Fax: [phone]    Primary Care Provider: Shobha Lazcano    Primary Insurance: MEDICARE  Secondary Insurance: ISABEL    DISCHARGE DETAILS:  Discharge planning discussed with[de-identified] Patient and her niece Eduardo Bennetter of Choice: Yes  Comments - Freedom of Choice: [facility]  CM contacted family/caregiver?: Yes  Were Treatment Team discharge recommendations reviewed with patient/caregiver?: Yes  Did patient/caregiver verbalize understanding of patient care needs?: Yes  Were patient/caregiver advised of the risks associated with not following Treatment Team discharge recommendations?: Yes    Contacts  Patient Contacts: Jimmy Jackson  Relationship to Patient[de-identified] Family (niece)  Contact Method: Phone  Phone Number: [phone]  Reason/Outcome: Emergency Contact,Continuity of [facility]    DME Referral Provided  Referral made for DME?: Yes  DME referral completed for the following items[de-identified] Portable Oxygen tanks,Home Oxygen concentrator  DME Supplier Name[de-identified] [employer]    Other Referral/Resources/Interventions Provided:  Interventions: DME,HHC    Would you like to participate in our [address] service program?  : No - Declined    Treatment Team Recommendation: Home with [address]  Discharge Destination Plan[de-identified] Home with Karinaraúlalisa at Discharge : [address] (Date):: 03/11/22  IMM Given to[de-identified] Patient         Home O2 test complete and patient will need O2 at home  CM spoke with patient and she is aware and has no DME preference  Confirmed dc plan for patient to go home with her carter Irvin and [facility]  An order was placed to [address] in [facility] for O2  F/U apptmt card also provided to patient for Dr Nguyen Motley office  CM called patients carter Irvin and confirmed the above dc plan  She states [facility]  Discharge is anticipated for tomorrow  Albaro to pick her up

## 2022-03-12 NOTE — PLAN OF CARE
Problem: Nutrition/Hydration-ADULT  Goal: Nutrient/Hydration intake appropriate for improving, restoring or maintaining nutritional needs  Description: Monitor and assess patient's nutrition/hydration status for malnutrition  Collaborate with interdisciplinary team and initiate plan and interventions as ordered  Monitor patient's weight and dietary intake as ordered or per policy  Utilize nutrition screening tool and intervene as necessary  Determine patient's food preferences and provide high-protein, high-caloric foods as appropriate       INTERVENTIONS:  - Monitor oral intake, urinary output, labs, and treatment plans  - Assess nutrition and hydration status and recommend course of action  - Evaluate amount of meals eaten  - Assist patient with eating if necessary   - Allow adequate time for meals  - Recommend/ encourage appropriate diets, oral nutritional supplements, and vitamin/mineral supplements  - Order, calculate, and assess calorie counts as needed  - Recommend, monitor, and adjust tube feedings and TPN/PPN based on assessed needs  - Assess need for intravenous fluids  - Provide specific nutrition/hydration education as appropriate  - Include patient/family/caregiver in decisions related to nutrition  Outcome: Progressing     Problem: Prexisting or High Potential for Compromised Skin Integrity  Goal: Skin integrity is maintained or improved  Description: INTERVENTIONS:  - Identify patients at risk for skin breakdown  - Assess and monitor skin integrity  - Assess and monitor nutrition and hydration status  - Monitor labs   - Turn and reposition patient  - Assist with mobility/ambulation  - Relieve pressure over bony prominences  - Avoid friction and shearing  - Provide appropriate hygiene as needed including keeping skin clean and dry  - Evaluate need for skin moisturizer/barrier cream  - Collaborate with interdisciplinary team   - Patient/family teaching    Outcome: Progressing     Problem: Potential for Falls  Goal: Patient will remain free of falls  Description: INTERVENTIONS:  - Educate patient/family on patient safety including physical limitations  - Instruct patient to call for assistance with activity   - Consult OT/PT to assist with strengthening/mobility   - Keep Call bell within reach  - Keep bed low and locked with side rails adjusted as appropriate  - Keep care items and personal belongings within reach  - Initiate and maintain comfort rounds  - Make Fall Risk Sign visible to staff    - Initiate/Maintain alarm  - Obtain necessary fall risk management equipment:   - Apply yellow socks and bracelet for high fall risk patients  - Consider moving patient to room near nurses station  Outcome: Progressing     Problem: MOBILITY - ADULT  Goal: Maintain or return to baseline ADL function  Description: INTERVENTIONS:  - Educate patient/family on patient safety including physical limitations  - Instruct patient to call for assistance with activity   - Consult OT/PT to assist with strengthening/mobility   - Keep Call bell within reach  - Keep bed low and locked with side rails adjusted as appropriate  - Keep care items and personal belongings within reach  - Initiate and maintain comfort rounds  - Make Fall Risk Sign visible to staff    - Apply yellow socks and bracelet for high fall risk patients  - Consider moving patient to room near nurses station  Outcome: Progressing     Problem: PAIN - ADULT  Goal: Verbalizes/displays adequate comfort level or baseline comfort level  Description: Interventions:  - Encourage patient to monitor pain and request assistance  - Assess pain using appropriate pain scale  - Administer analgesics based on type and severity of pain and evaluate response  - Implement non-pharmacological measures as appropriate and evaluate response  - Consider cultural and social influences on pain and pain management  - Notify physician/advanced practitioner if interventions unsuccessful or patient reports new pain  Outcome: Progressing     Problem: INFECTION - ADULT  Goal: Absence or prevention of progression during hospitalization  Description: INTERVENTIONS:  - Assess and monitor for signs and symptoms of infection  - Monitor lab/diagnostic results  - Monitor all insertion sites, i e  indwelling lines, tubes, and drains  - Monitor endotracheal if appropriate and nasal secretions for changes in amount and color  - [place] appropriate cooling/warming therapies per order  - Administer medications as ordered  - Instruct and encourage patient and family to use good hand hygiene technique  - Identify and instruct in appropriate isolation precautions for identified infection/condition  Outcome: Progressing     Problem: SAFETY ADULT  Goal: Patient will remain free of falls  Description: INTERVENTIONS:  - Educate patient/family on patient safety including physical limitations  - Instruct patient to call for assistance with activity   - Consult OT/PT to assist with strengthening/mobility   - Keep Call bell within reach  - Keep bed low and locked with side rails adjusted as appropriate  - Keep care items and personal belongings within reach  - Initiate and maintain comfort rounds  - Make Fall Risk Sign visible to staff    - Initiate/Maintain alarm  - Obtain necessary fall risk management equipment:   - Apply yellow socks and bracelet for high fall risk patients  - Consider moving patient to room near nurses station  Outcome: Progressing  Goal: Maintain or return to baseline ADL function  Description: INTERVENTIONS:  - Educate patient/family on patient safety including physical limitations  - Instruct patient to call for assistance with activity   - Consult OT/PT to assist with strengthening/mobility   - Keep Call bell within reach  - Keep bed low and locked with side rails adjusted as appropriate  - Keep care items and personal belongings within reach  - Initiate and maintain comfort rounds  - Make Fall Risk Sign visible to staff    - Apply yellow socks and bracelet for high fall risk patients  - Consider moving patient to room near nurses station  Outcome: Progressing  Goal: Maintains/Returns to pre admission functional level  Description: INTERVENTIONS:  - Perform BMAT or MOVE assessment daily    - Set and communicate daily mobility goal to care team and patient/family/caregiver  - Collaborate with rehabilitation services on mobility goals if consulted  - Perform Range of Motion 3 times a day  - Reposition patient every 2 hours  - Dangle patient 3 times a day  - Stand patient 3 times a day  - Ambulate patient 3 times a day  - Out of bed to chair 3 times a day   - Out of bed for meals 3 times a day  - Out of bed for toileting  - Record patient progress and toleration of activity level   Outcome: Progressing     Problem: DISCHARGE PLANNING  Goal: Discharge to home or other facility with appropriate resources  Description: INTERVENTIONS:  - Identify barriers to discharge w/patient and caregiver  - Arrange for needed discharge resources and transportation as appropriate  - Identify discharge learning needs (meds, wound care, etc )  - Arrange for interpretive services to assist at discharge as needed  - Refer to Case Management Department for coordinating discharge planning if the patient needs post-hospital services based on physician/advanced practitioner order or complex needs related to functional status, cognitive ability, or social support system  Outcome: Progressing     Problem: Knowledge Deficit  Goal: Patient/family/caregiver demonstrates understanding of disease process, treatment plan, medications, and discharge instructions  Description: Complete learning assessment and assess knowledge base    Interventions:  - Provide teaching at level of understanding  - Provide teaching via preferred learning methods  Outcome: Progressing     Problem: CARDIOVASCULAR - ADULT  Goal: Maintains optimal cardiac output and hemodynamic stability  Description: INTERVENTIONS:  - Monitor I/O, vital signs and rhythm  - Monitor for S/S and trends of decreased cardiac output  - Assess quality of pulses, skin color and temperature  - Instruct patient to report change in severity of symptoms  Outcome: Progressing  Goal: Absence of cardiac dysrhythmias or at baseline rhythm  Description: INTERVENTIONS:  - Continuous cardiac monitoring, vital signs, obtain 12 lead EKG if ordered  - Administer antiarrhythmic and heart rate control medications as ordered  - Monitor electrolytes and administer replacement therapy as ordered  Outcome: Progressing     Problem: RESPIRATORY - ADULT  Goal: Achieves optimal ventilation and oxygenation  Description: INTERVENTIONS:  - Assess for changes in respiratory status  - Assess for changes in mentation and behavior  - Position to facilitate oxygenation and minimize respiratory effort  - Oxygen administered by appropriate delivery if ordered  - Initiate smoking cessation education as indicated  - Encourage broncho-pulmonary hygiene including cough, deep breathe, Incentive Spirometry  - Assess the need for suctioning and aspirate as needed  - Assess and instruct to report SOB or any respiratory difficulty  - Respiratory Therapy support as indicated  Outcome: Progressing

## 2022-03-12 NOTE — PROGRESS NOTES
[place] [facility]  Progress Note Melonie [place] 1950, 73 y o  female MRN: [id number]  Unit/Bed#: [address] [place] [address] Encounter: [id number]  Primary Care Provider: Jose Raul Goldsmith   Date and time admitted to hospital: 3/9/2022  4:26 PM    * Acute diastolic congestive heart failure [facility])  Assessment & Plan  Wt Readings from Last 3 Encounters:   03/12/22 133 kg (294 lb 3 2 oz)   03/08/22 135 kg (297 lb 9 9 oz)   03/03/22 127 kg (279 lb 15 8 oz)     - SOB POA, secondary to acute on chronic diastolic CHF  BNP 5992  CT shows small b/l plerual effusions   - recent shoulder surgery, SEBASTIAN, not on diuretics at SNF (previously on torsemide 10mg/d)  - follow up repeat Echo - previously LVEF 55%  -echocardiogram performed 3/10 shows EF 37%, systolic function low normal, borderline concentric hypertrophy as per Cardiology report  - pro calcitonin negative - would hold off abx for aspiration related air space opacities seen on CT    - CTA chest negative for pulmonary embolism   -transition from IV Lasix to p o   Demadex 20 mg q day  Continue metoprolol  - daily weight, I/O, CHF diet   - cardiology evaluation appreciated     Diabetes mellitus, type 2 [facility])  Assessment & Plan  Lab Results   Component Value Date    HGBA1C 6 8 (H) 03/09/2022     Recent Labs     03/11/22  1618 03/11/22  1955 03/12/22  0708 03/12/22  1057   POCGLU 157* 202* 63* 112     Blood Sugar Average: Last 72 hrs:  (P) 146 [id number]   - on Lantus 20 units SQ daily   - Insulin Sliding scale   - A1c indicated good control     Paroxysmal atrial fibrillation (HCC)  Assessment & Plan  - On arrival rapid a fib with RVR responded to IV lopressor - continue 5 mg IV prn   - continue metoprolol tartrate 50 mg PO BID at home   - DEMETRIS Vasc score 3   - Dr Zazueta Nail cleared to resume Eliquis   - as per cardiology note for pre op clearance 3/1/22: Patient typically has episodes of paroxysmal SVT & a fib was only in past - as per note - if no recurrence then consideration for dc Eliquis - cardiology consulted    Continue Eliquis 5 mg p o  B i d     Essential hypertension  Assessment & Plan  - on lopressor PO    Status post reverse total replacement of left shoulder  Assessment & Plan  - s/p fall resulting in Comminuted displaced fracture of the proximal humerus (2/23/22)  - s/p left shoulder reverse complete arthroplasty (3/1/22)   - Cleared from ortho standpoint for patient to start Eliquis   - LUE venous duplex is negative for DVT  - PT / OT consults appreciated   -patient has appointment with Orthopedics on 03/14 outpatient    Anemia  Assessment & Plan  - Hgb stable  - likely worsened secondary to post op loss     Mixed hyperlipidemia  Assessment & Plan  - on rosuvastatin  Heart healthy diet    Morbid obesity with BMI of 45 0-49 9, adult ([facility] [address] )  Assessment & Plan  - counseled weight loss   - sleep apnea   - refusing CPAP           VTE Pharmacologic Prophylaxis: VTE Score: 17 High Risk (Score >/= 5) - Pharmacological DVT Prophylaxis Ordered: apixaban (Eliquis)  Sequential Compression Devices Ordered  Patient Centered Rounds: I performed bedside rounds with nursing staff today  Discussions with Specialists or Other Care Team Provider:  Case management    Education and Discussions with Family / Patient: Updated  (carter) via phone  Time Spent for Care: 30 minutes  More than 50% of total time spent on counseling and coordination of care as described above  Current Length of Stay: 3 day(s)  Current Patient Status: Inpatient   Certification Statement: The patient will continue to require additional inpatient hospital stay due to Coordination of care for safe discharge to home with O2 and medications  Discharge Plan: Anticipate discharge tomorrow to home with home services  Code Status: Level 1 - Full Code    Subjective:   Patient seen sitting in bed resting comfortably    Had just finished breakfast   Reports that overall she is feeling pretty good   O2 evaluation was performed patient will require oxygen home  This was discussed with the patient  She denies any headache, reported that she did have a period of brief dizziness when ambulating with respiratory therapy  Objective:     Vitals:   Temp (24hrs), Av 5 °F (36 4 °C), Min:97 3 °F (36 3 °C), Max:97 8 °F (36 6 °C)    Temp:  [97 3 °F (36 3 °C)-97 8 °F (36 6 °C)] 97 8 °F (36 6 °C)  HR:  [71-87] 71  Resp:  [18-19] 19  BP: (140-148)/(61-66) 140/61  SpO2:  [92 %-98 %] 92 %  Body mass index is 53 81 kg/m²  Input and Output Summary (last 24 hours): Intake/Output Summary (Last 24 hours) at 3/12/2022 1149  Last data filed at 3/11/2022 1901  Gross per 24 hour   Intake 525 ml   Output 300 ml   Net 225 ml       Physical Exam:   Physical Exam  Vitals and nursing note reviewed  Constitutional:       General: She is not in acute distress  Appearance: She is obese  HENT:      Head: Normocephalic  Nose: Nose normal       Mouth/Throat:      Mouth: Mucous membranes are moist    Eyes:      Extraocular Movements: Extraocular movements intact  Conjunctiva/sclera: Conjunctivae normal       Pupils: Pupils are equal, round, and reactive to light  Cardiovascular:      Rate and Rhythm: Normal rate and regular rhythm  Pulses: Normal pulses  Heart sounds: Normal heart sounds  Pulmonary:      Effort: Pulmonary effort is normal       Breath sounds: Normal breath sounds  Abdominal:      General: Bowel sounds are normal  There is no distension  Palpations: Abdomen is soft  Tenderness: There is no abdominal tenderness  Genitourinary:     Comments: Voiding spontaneously  Musculoskeletal:      Cervical back: Normal range of motion  Comments: Pedal edema bilat; +1   Skin:     General: Skin is warm and dry  Capillary Refill: Capillary refill takes less than 2 seconds        Comments: Left shoulder incision area approximated, CDI   Neurological:      General: No focal deficit present  Mental Status: She is alert and oriented to person, place, and time  Psychiatric:         Mood and Affect: Mood normal          Behavior: Behavior normal          Thought Content: Thought content normal          Judgment: Judgment normal          Additional Data:     Labs:  Results from last 7 days   Lab Units 03/10/22  0513   WBC Thousand/uL 6 19   HEMOGLOBIN g/dL 8 9*   HEMATOCRIT % 30 9*   PLATELETS Thousands/uL 300   NEUTROS PCT % 54   LYMPHS PCT % 25   MONOS PCT % 15*   EOS PCT % 4     Results from last 7 days   Lab Units 03/12/22  0426 03/10/22  0513 03/10/22  0513   SODIUM mmol/L 144   < > 146*   POTASSIUM mmol/L 4 3   < > 4 2   CHLORIDE mmol/L 106   < > 107   CO2 mmol/L 34*   < > 32   BUN mg/dL 32*   < > 30*   CREATININE mg/dL 1 78*   < > 1 24   ANION GAP mmol/L 4   < > 7   CALCIUM mg/dL 7 6*   < > 8 2*   ALBUMIN g/dL  --   --  2 5*   TOTAL BILIRUBIN mg/dL  --   --  0 32   ALK PHOS U/L  --   --  79   ALT U/L  --   --  18   AST U/L  --   --  14   GLUCOSE RANDOM mg/dL 91   < > 130    < > = values in this interval not displayed  Results from last 7 days   Lab Units 03/10/22  0513   INR  1 34*     Results from last 7 days   Lab Units 03/12/22  1057 03/12/22  0708 03/11/22  1955 03/11/22  1618 03/11/22  1128 03/11/22  0656 03/10/22  2006 03/10/22  1631 03/10/22  1120 03/10/22  0729 03/09/22  2229 03/09/22  0833   POC GLUCOSE mg/dl 112 63* 202* 157* 193* 105 167* 184* 158* 121 152* 135     Results from last 7 days   Lab Units 03/09/22  1830   HEMOGLOBIN A1C % 6 8*     Results from last 7 days   Lab Units 03/10/22  0513 03/09/22  2106   PROCALCITONIN ng/ml 0 08 0 08       Lines/Drains:  Invasive Devices  Report    Peripheral Intravenous Line            Peripheral IV 03/09/22 Right;Ventral (anterior) Forearm 2 days                      Imaging: No pertinent imaging reviewed      Recent Cultures (last 7 days):   Results from last 7 days   Lab Units 03/10/22  0349 03/09/22  2127 03/09/22 2106   BLOOD CULTURE   --  No Growth at 48 hrs  No Growth at 48 hrs  URINE CULTURE  <10,000 cfu/ml   --   --        Last 24 Hours Medication List:   Current Facility-Administered Medications   Medication Dose Route Frequency Provider Last Rate    albuterol  2 puff Inhalation Q6H PRN Dorota Blanco, MD      aluminum-magnesium hydroxide-simethicone  30 mL Oral Q6H PRN Dorota Blanco, MD      amLODIPine  5 mg Oral Daily Dorota Late, MD      ammonium lactate   Topical BID Tetollene Late, MD      apixaban  5 mg Oral BID Tetollene Late, MD      ferric gluconate  125 mg Intravenous Daily Dorota Blanco, MD      insulin glargine  20 Units Subcutaneous HS Dorota Blanco, MD      insulin lispro  1-5 Units Subcutaneous TID [facility] [place] Dorota Blanco, MD      insulin lispro  1-5 Units Subcutaneous HS Dorota Late, MD      lidocaine  2 patch Topical Daily Dorota Late, MD      loratadine  10 mg Oral Daily Dorota Blanco, MD      magnesium hydroxide  30 mL Oral Daily PRN Dorota Late, MD      metoprolol  5 mg Intravenous Q6H PRN Dorota Late, MD      metoprolol tartrate  50 mg Oral Q12H Edyta Francisco MD      montelukast  10 mg Oral HS Jillene Late, MD      nitroglycerin  0 4 mg Sublingual Q5 Min PRN Tetollck Late, MD      nystatin   Topical BID Tetollene Late, MD      ondansetron  4 mg Intravenous Q6H PRN Tetollene Late, MD      pravastatin  80 mg Oral Daily With Chery Hays MD      pregabalin  100 mg Oral BID Dorota Late, MD      torsemide  20 mg Oral Daily RK Ashley      traMADol  50 mg Oral Q6H PRN Lakeshia Laughter MICHELLE Rm          Today, Patient Was Seen By: RK Falcon    **Please Note: This note may have been constructed using a voice recognition system  **

## 2022-03-12 NOTE — TREATMENT PLAN
Home Oxygen Qualifying Test     Patient name: Beny Katz        : 1950   Date of Test:  2022  Diagnosis:    Home Oxygen Test:    Medicare Guidelines require item(s) 1-5 on all ambulatory patients or 1 and 2 on non-ambulatory patients  1  Baseline SPO2 on Room Air at rest 98%  a  If <= 88% on Room Air add O2 via NC to obtain SpO2 >=88%  If LPM needed, document LPM  needed to reach =>88%    2  SPO2 during exertion on Room Air 87 %  a  During exertion monitor SPO2  If SPO2 increases >=89%, do not add supplemental oxygen    3  SPO2 on Oxygen at Rest 98 % at 2 LPM    4  SPO2 during exertion on Oxygen 92 % at 3LPM    5  Test performed during exertion activity  [x]  Supplemental Home Oxygen is indicated  []  Client does not qualify for home oxygen  Respiratory Additional Notes-   Patient will need oxygen when walking    Bary Oz

## 2022-03-12 NOTE — ASSESSMENT & PLAN NOTE
- s/p fall resulting in Comminuted displaced fracture of the proximal humerus (2/23/22)    - s/p left shoulder reverse complete arthroplasty (3/1/22)   - Cleared from ortho standpoint for patient to start Eliquis   - LUE venous duplex is negative for DVT  - PT / OT consults appreciated   -patient has appointment with Orthopedics on 03/14 outpatient

## 2022-03-12 NOTE — ASSESSMENT & PLAN NOTE
Wt Readings from Last 3 Encounters:   03/12/22 133 kg (294 lb 3 2 oz)   03/08/22 135 kg (297 lb 9 9 oz)   03/03/22 127 kg (279 lb 15 8 oz)     - SOB POA, secondary to acute on chronic diastolic CHF  BNP 5992  CT shows small b/l plerual effusions   - recent shoulder surgery, SEBASTIAN, not on diuretics at SNF (previously on torsemide 10mg/d)  - follow up repeat Echo - previously LVEF 55%  -echocardiogram performed 3/10 shows EF 47%, systolic function low normal, borderline concentric hypertrophy as per Cardiology report  - pro calcitonin negative - would hold off abx for aspiration related air space opacities seen on CT    - CTA chest negative for pulmonary embolism   -transition from IV Lasix to p o   Demadex 20 mg q day  Continue metoprolol  - daily weight, I/O, CHF diet   - cardiology evaluation appreciated

## 2022-03-13 ENCOUNTER — APPOINTMENT (INPATIENT)
Dept: RADIOLOGY | Facility: HOSPITAL | Age: 72
DRG: 291 | End: 2022-03-13
Payer: MEDICARE

## 2022-03-13 LAB
ANION GAP SERPL CALCULATED.3IONS-SCNC: 4 MMOL/L (ref 4–13)
BUN SERPL-MCNC: 33 MG/DL (ref 5–25)
CALCIUM SERPL-MCNC: 7.6 MG/DL (ref 8.3–10.1)
CHLORIDE SERPL-SCNC: 106 MMOL/L (ref 100–108)
CO2 SERPL-SCNC: 33 MMOL/L (ref 21–32)
CREAT SERPL-MCNC: 1.85 MG/DL (ref 0.6–1.3)
DME PARACHUTE DELIVERY DATE ACTUAL: NORMAL
DME PARACHUTE DELIVERY DATE EXPECTED: NORMAL
DME PARACHUTE DELIVERY DATE REQUESTED: NORMAL
DME PARACHUTE ITEM DESCRIPTION: NORMAL
DME PARACHUTE ORDER STATUS: NORMAL
DME PARACHUTE SUPPLIER NAME: NORMAL
DME PARACHUTE SUPPLIER PHONE: NORMAL
ERYTHROCYTE [DISTWIDTH] IN BLOOD BY AUTOMATED COUNT: 15.5 % (ref 11.6–15.1)
GFR SERPL CREATININE-BSD FRML MDRD: 27 ML/MIN/1.73SQ M
GLUCOSE SERPL-MCNC: 167 MG/DL (ref 65–140)
GLUCOSE SERPL-MCNC: 196 MG/DL (ref 65–140)
GLUCOSE SERPL-MCNC: 199 MG/DL (ref 65–140)
GLUCOSE SERPL-MCNC: 211 MG/DL (ref 65–140)
GLUCOSE SERPL-MCNC: 223 MG/DL (ref 65–140)
HCT VFR BLD AUTO: 29.7 % (ref 34.8–46.1)
HGB BLD-MCNC: 8.7 G/DL (ref 11.5–15.4)
MCH RBC QN AUTO: 28.4 PG (ref 26.8–34.3)
MCHC RBC AUTO-ENTMCNC: 29.3 G/DL (ref 31.4–37.4)
MCV RBC AUTO: 97 FL (ref 82–98)
PLATELET # BLD AUTO: 253 THOUSANDS/UL (ref 149–390)
PMV BLD AUTO: 10.1 FL (ref 8.9–12.7)
POTASSIUM SERPL-SCNC: 4.5 MMOL/L (ref 3.5–5.3)
RBC # BLD AUTO: 3.06 MILLION/UL (ref 3.81–5.12)
SODIUM SERPL-SCNC: 143 MMOL/L (ref 136–145)
WBC # BLD AUTO: 8.23 THOUSAND/UL (ref 4.31–10.16)

## 2022-03-13 PROCEDURE — 99233 SBSQ HOSP IP/OBS HIGH 50: CPT | Performed by: INTERNAL MEDICINE

## 2022-03-13 PROCEDURE — 99024 POSTOP FOLLOW-UP VISIT: CPT | Performed by: PHYSICIAN ASSISTANT

## 2022-03-13 PROCEDURE — 97110 THERAPEUTIC EXERCISES: CPT

## 2022-03-13 PROCEDURE — 82948 REAGENT STRIP/BLOOD GLUCOSE: CPT

## 2022-03-13 PROCEDURE — 99232 SBSQ HOSP IP/OBS MODERATE 35: CPT | Performed by: NURSE PRACTITIONER

## 2022-03-13 PROCEDURE — 71045 X-RAY EXAM CHEST 1 VIEW: CPT

## 2022-03-13 PROCEDURE — 80048 BASIC METABOLIC PNL TOTAL CA: CPT | Performed by: NURSE PRACTITIONER

## 2022-03-13 PROCEDURE — 97535 SELF CARE MNGMENT TRAINING: CPT

## 2022-03-13 PROCEDURE — 85027 COMPLETE CBC AUTOMATED: CPT | Performed by: NURSE PRACTITIONER

## 2022-03-13 RX ORDER — ALBUMIN, HUMAN INJ 5% 5 %
12.5 SOLUTION INTRAVENOUS
Status: DISCONTINUED | OUTPATIENT
Start: 2022-03-14 | End: 2022-03-15

## 2022-03-13 RX ORDER — FUROSEMIDE 10 MG/ML
40 INJECTION INTRAMUSCULAR; INTRAVENOUS DAILY
Status: DISCONTINUED | OUTPATIENT
Start: 2022-03-13 | End: 2022-03-13

## 2022-03-13 RX ORDER — FUROSEMIDE 10 MG/ML
60 INJECTION INTRAMUSCULAR; INTRAVENOUS
Status: DISCONTINUED | OUTPATIENT
Start: 2022-03-14 | End: 2022-03-15

## 2022-03-13 RX ORDER — METOPROLOL TARTRATE 50 MG/1
50 TABLET, FILM COATED ORAL EVERY 12 HOURS SCHEDULED
Qty: 60 TABLET | Refills: 0 | Status: SHIPPED | OUTPATIENT
Start: 2022-03-13 | End: 2022-04-28

## 2022-03-13 RX ORDER — TORSEMIDE 20 MG/1
20 TABLET ORAL DAILY
Qty: 30 TABLET | Refills: 0 | Status: SHIPPED | OUTPATIENT
Start: 2022-03-14 | End: 2022-03-22

## 2022-03-13 RX ORDER — ALBUMIN, HUMAN INJ 5% 5 %
12.5 SOLUTION INTRAVENOUS ONCE
Status: COMPLETED | OUTPATIENT
Start: 2022-03-13 | End: 2022-03-13

## 2022-03-13 RX ORDER — FUROSEMIDE 10 MG/ML
20 INJECTION INTRAMUSCULAR; INTRAVENOUS ONCE
Status: COMPLETED | OUTPATIENT
Start: 2022-03-13 | End: 2022-03-13

## 2022-03-13 RX ADMIN — METOPROLOL TARTRATE 50 MG: 50 TABLET, FILM COATED ORAL at 08:18

## 2022-03-13 RX ADMIN — INSULIN LISPRO 1 UNITS: 100 INJECTION, SOLUTION INTRAVENOUS; SUBCUTANEOUS at 21:09

## 2022-03-13 RX ADMIN — ALBUMIN (HUMAN) 12.5 G: 12.5 INJECTION, SOLUTION INTRAVENOUS at 14:02

## 2022-03-13 RX ADMIN — Medication: at 18:00

## 2022-03-13 RX ADMIN — FUROSEMIDE 20 MG: 10 INJECTION, SOLUTION INTRAMUSCULAR; INTRAVENOUS at 17:59

## 2022-03-13 RX ADMIN — Medication: at 08:19

## 2022-03-13 RX ADMIN — LIDOCAINE 5% 2 PATCH: 700 PATCH TOPICAL at 08:19

## 2022-03-13 RX ADMIN — METOPROLOL TARTRATE 50 MG: 50 TABLET, FILM COATED ORAL at 21:06

## 2022-03-13 RX ADMIN — MONTELUKAST 10 MG: 10 TABLET, FILM COATED ORAL at 21:06

## 2022-03-13 RX ADMIN — SODIUM FERRIC GLUCONATE COMPLEX 125 MG: 12.5 INJECTION INTRAVENOUS at 08:20

## 2022-03-13 RX ADMIN — FUROSEMIDE 40 MG: 10 INJECTION, SOLUTION INTRAMUSCULAR; INTRAVENOUS at 14:54

## 2022-03-13 RX ADMIN — INSULIN LISPRO 2 UNITS: 100 INJECTION, SOLUTION INTRAVENOUS; SUBCUTANEOUS at 12:12

## 2022-03-13 RX ADMIN — APIXABAN 5 MG: 5 TABLET, FILM COATED ORAL at 17:59

## 2022-03-13 RX ADMIN — APIXABAN 5 MG: 5 TABLET, FILM COATED ORAL at 08:18

## 2022-03-13 RX ADMIN — INSULIN LISPRO 1 UNITS: 100 INJECTION, SOLUTION INTRAVENOUS; SUBCUTANEOUS at 17:59

## 2022-03-13 RX ADMIN — NYSTATIN: 100000 POWDER TOPICAL at 08:19

## 2022-03-13 RX ADMIN — INSULIN GLARGINE 20 UNITS: 100 INJECTION, SOLUTION SUBCUTANEOUS at 21:07

## 2022-03-13 RX ADMIN — PREGABALIN 100 MG: 100 CAPSULE ORAL at 08:18

## 2022-03-13 RX ADMIN — PREGABALIN 100 MG: 100 CAPSULE ORAL at 17:59

## 2022-03-13 RX ADMIN — TORSEMIDE 20 MG: 20 TABLET ORAL at 08:18

## 2022-03-13 RX ADMIN — PRAVASTATIN SODIUM 80 MG: 80 TABLET ORAL at 17:59

## 2022-03-13 RX ADMIN — AMLODIPINE BESYLATE 5 MG: 5 TABLET ORAL at 08:18

## 2022-03-13 RX ADMIN — INSULIN LISPRO 1 UNITS: 100 INJECTION, SOLUTION INTRAVENOUS; SUBCUTANEOUS at 08:18

## 2022-03-13 RX ADMIN — LORATADINE 10 MG: 10 TABLET ORAL at 08:18

## 2022-03-13 RX ADMIN — NYSTATIN: 100000 POWDER TOPICAL at 18:00

## 2022-03-13 NOTE — PROGRESS NOTES
[place] 47  Progress Note Leon Brantley 1950, 73 y o  female MRN: [id number]  Unit/Bed#: [address] [place] [address] Encounter: [id number]  Primary Care Provider: Jas Sparrow   Date and time admitted to hospital: 3/9/2022  4:26 PM    * Acute diastolic congestive heart failure [facility])  Assessment & Plan  Wt Readings from Last 3 Encounters:   03/13/22 132 kg (292 lb 1 8 oz)   03/08/22 135 kg (297 lb 9 9 oz)   03/03/22 127 kg (279 lb 15 8 oz)     - SOB POA, secondary to acute on chronic diastolic CHF  BNP 5992  CT shows small b/l plerual effusions   - recent shoulder surgery, SEBASTIAN, not on diuretics at SNF (previously on torsemide 10mg/d)  - follow up repeat Echo - previously LVEF 55%  -echocardiogram performed 3/10 shows EF 51%, systolic function low normal, borderline concentric hypertrophy as per Cardiology report  - pro calcitonin negative - would hold off abx for aspiration related air space opacities seen on CT    - CTA chest negative for pulmonary embolism   Patient had developed some SOB on 3/13; repeat chest xray continues to show pulmonary congestion  Transitioned back to lasix IV with a one time albumin IV  Continue metoprolol 50 mg q 12 hours  Continue intake and output, daily weights     Discussed plan with patient and her family      Diabetes mellitus, type 2 [facility])  Assessment & Plan  Lab Results   Component Value Date    HGBA1C 6 8 (H) 03/09/2022     Recent Labs     03/12/22  1057 03/12/22  1658 03/12/22 2016 03/13/22  0614   POCGLU 112 191* 249* 167*     Blood Sugar Average: Last 72 hrs:  (P) 584 [id number]   - on Lantus 20 units SQ daily   - A1c indicated good control   Continue sliding scale coverage    Paroxysmal atrial fibrillation (HCC)  Assessment & Plan  - On arrival rapid a fib with RVR responded to IV lopressor - continue 5 mg IV prn   - continue metoprolol tartrate 50 mg PO BID at home   - DEMETRIS Vasc score 3   - Dr Hammond Salvage cleared to resume Eliquis    Continue Eliquis 5 mg p o  B i d       Essential hypertension  Assessment & Plan  - on lopressor PO; increased to 50 mg p o  Q 12 hours during hospitalization  Status post reverse total replacement of left shoulder  Assessment & Plan  - s/p fall resulting in Comminuted displaced fracture of the proximal humerus (2/23/22)  - s/p left shoulder reverse complete arthroplasty (3/1/22)   - Cleared from ortho standpoint for patient to start Eliquis   - LUE venous duplex is negative for DVT  - PT / OT consults appreciated; recommending home with therapy      Anemia  Assessment & Plan  - Hgb stable  - likely worsened secondary to post op loss     Mixed hyperlipidemia  Assessment & Plan  - on rosuvastatin  Heart healthy diet    Morbid obesity with BMI of 45 0-49 9, adult ([facility] [address] )  Assessment & Plan  - counseled weight loss   - sleep apnea   - refusing CPAP           VTE Pharmacologic Prophylaxis: VTE Score: 17 High Risk (Score >/= 5) - Pharmacological DVT Prophylaxis Ordered: apixaban (Eliquis)  Sequential Compression Devices Ordered  Patient Centered Rounds: I performed bedside rounds with nursing staff today  Discussions with Specialists or Other Care Team Provider: Attending    Education and Discussions with Family / Patient: Updated  (niece) at bedside  Time Spent for Care: 30 minutes  More than 50% of total time spent on counseling and coordination of care as described above  Current Length of Stay: 4 day(s)  Current Patient Status: Inpatient   Certification Statement: The patient will continue to require additional inpatient hospital stay due to IV lasix, SOB, abnormal chest xray  Discharge Plan: Anticipate discharge in 24-48 hrs to home with home services  Code Status: Level 1 - Full Code    Subjective:   Patient seen initially in bed, appeared comfortable  When patient was seen later after getting up and getting dressed, she appeared short of breath  She admitted to feeling short of breath  Repeated chest xray, still shows pulmonary congestion  Patient agreeable to continuing her stay and receiving additional lasix for more diuresis  Objective:     Vitals:   Temp (24hrs), Av 7 °F (36 5 °C), Min:97 6 °F (36 4 °C), Max:97 8 °F (36 6 °C)    Temp:  [97 6 °F (36 4 °C)-97 8 °F (36 6 °C)] 97 6 °F (36 4 °C)  HR:  [75-86] 75  Resp:  [18] 18  BP: (134-178)/(59-83) 178/83  SpO2:  [92 %-98 %] 94 %  Body mass index is 53 43 kg/m²  Input and Output Summary (last 24 hours): Intake/Output Summary (Last 24 hours) at 3/13/2022 1556  Last data filed at 3/13/2022 1143  Gross per 24 hour   Intake 440 ml   Output 1400 ml   Net -960 ml       Physical Exam:   Physical Exam  Vitals and nursing note reviewed  Constitutional:       Appearance: She is obese  HENT:      Head: Normocephalic  Nose: Congestion present  Mouth/Throat:      Mouth: Mucous membranes are moist    Eyes:      Extraocular Movements: Extraocular movements intact  Conjunctiva/sclera: Conjunctivae normal       Pupils: Pupils are equal, round, and reactive to light  Cardiovascular:      Rate and Rhythm: Normal rate  Rhythm irregular  Pulses: Normal pulses  Pulmonary:      Comments: Diminished bilaterally  Patient demonstrating SOB with exertion  Abdominal:      General: Bowel sounds are normal       Palpations: Abdomen is soft  Comments: Obese  Genitourinary:     Comments: Voiding spontaneously  Musculoskeletal:      Cervical back: Normal range of motion  Right lower leg: Edema present  Left lower leg: Edema present  Comments: Left arm in sling; limited mobility   Skin:     General: Skin is warm and dry  Comments: Left shoulder surgical incision present, CDI   Neurological:      General: No focal deficit present  Mental Status: She is alert and oriented to person, place, and time     Psychiatric:         Mood and Affect: Mood normal          Behavior: Behavior normal          Thought Content: Thought content normal          Judgment: Judgment normal          Additional Data:     Labs:  Results from last 7 days   Lab Units 03/13/22  0445 03/10/22  0513 03/10/22  0513   WBC Thousand/uL 8 23   < > 6 19   HEMOGLOBIN g/dL 8 7*   < > 8 9*   HEMATOCRIT % 29 7*   < > 30 9*   PLATELETS Thousands/uL 253   < > 300   NEUTROS PCT %  --   --  54   LYMPHS PCT %  --   --  25   MONOS PCT %  --   --  15*   EOS PCT %  --   --  4    < > = values in this interval not displayed  Results from last 7 days   Lab Units 03/13/22  0445 03/12/22  0426 03/10/22  0513   SODIUM mmol/L 143   < > 146*   POTASSIUM mmol/L 4 5   < > 4 2   CHLORIDE mmol/L 106   < > 107   CO2 mmol/L 33*   < > 32   BUN mg/dL 33*   < > 30*   CREATININE mg/dL 1 85*   < > 1 24   ANION GAP mmol/L 4   < > 7   CALCIUM mg/dL 7 6*   < > 8 2*   ALBUMIN g/dL  --   --  2 5*   TOTAL BILIRUBIN mg/dL  --   --  0 32   ALK PHOS U/L  --   --  79   ALT U/L  --   --  18   AST U/L  --   --  14   GLUCOSE RANDOM mg/dL 196*   < > 130    < > = values in this interval not displayed       Results from last 7 days   Lab Units 03/10/22  0513   INR  1 34*     Results from last 7 days   Lab Units 03/13/22  1543 03/13/22  1113 03/13/22  0614 03/12/22 2016 03/12/22  1658 03/12/22  1057 03/12/22  0708 03/11/22  1955 03/11/22  1618 03/11/22  1128 03/11/22  0656 03/10/22  2006   POC GLUCOSE mg/dl 199* 223* 167* 249* 191* 112 63* 202* 157* 193* 105 167*     Results from last 7 days   Lab Units 03/09/22  1830   HEMOGLOBIN A1C % 6 8*     Results from last 7 days   Lab Units 03/10/22  0513 03/09/22  2106   PROCALCITONIN ng/ml 0 08 0 08       Lines/Drains:  Invasive Devices  Report    Peripheral Intravenous Line            Peripheral IV 03/09/22 Right;Ventral (anterior) Forearm 3 days          Drain            External Urinary Catheter <1 day                      Imaging: Reviewed radiology reports from this admission including: chest xray    Recent Cultures (last 7 days):   Results from last 7 days   Lab Units 03/10/22  0349 03/09/22 2126 03/09/22  2106   BLOOD CULTURE   --  No Growth at 72 hrs  No Growth at 72 hrs  URINE CULTURE  <10,000 cfu/ml   --   --        Last 24 Hours Medication List:   Current Facility-Administered Medications   Medication Dose Route Frequency Provider Last Rate    albuterol  2 puff Inhalation Q6H PRN Izabel Paredes, MD      aluminum-magnesium hydroxide-simethicone  30 mL Oral Q6H PRN Izabel Paredes, MD      amLODIPine  5 mg Oral Daily Izabel Bodily, MD      ammonium lactate   Topical BID Izabel Bodily, MD      apixaban  5 mg Oral BID Izabel Bodily, MD      ferric gluconate  125 mg Intravenous Daily Izabel Bodily, MD      furosemide  40 mg Intravenous Daily RK Sheth      insulin glargine  20 Units Subcutaneous HS Izabel Paredes, MD      insulin lispro  1-5 Units Subcutaneous [facility] Izabel Paredes MD      insulin lispro  1-5 Units Subcutaneous HS Izabel Paredes, MD      lidocaine  2 patch Topical Daily Izabel [facility], MD      loratadine  10 mg Oral Daily Izabel Bodily, MD      magnesium hydroxide  30 mL Oral Daily PRN Izabel Paredes, MD      metoprolol  5 mg Intravenous Q6H PRN Izabel Paredes, MD      metoprolol tartrate  50 mg Oral Q12H Melissa García MD      montelukast  10 mg Oral HS Izabel [facility], MD      nitroglycerin  0 4 mg Sublingual Q5 Min PRN Izabel Paredes, MD      nystatin   Topical BID Izabel [facility], MD      ondansetron  4 mg Intravenous Q6H PRN Izabel Paredes, MD      pravastatin  80 mg Oral Daily With Brent Corey MD      pregabalin  100 mg Oral BID Izabel [facility], MD      traMADol  50 mg Oral Q6H PRN Brittany Rm PA-C          Today, Patient Was Seen By: RK Sheth    **Please Note: This note may have been constructed using a voice recognition system  **

## 2022-03-13 NOTE — ASSESSMENT & PLAN NOTE
- On arrival rapid a fib with RVR responded to IV lopressor - continue 5 mg IV prn   - continue metoprolol tartrate 50 mg PO BID at home   - DEMETRIS Vasc score 3   - Dr Zazueta Nail cleared to resume Eliquis    Continue Eliquis 5 mg p o  B i d

## 2022-03-13 NOTE — PLAN OF CARE
Problem: Nutrition/Hydration-ADULT  Goal: Nutrient/Hydration intake appropriate for improving, restoring or maintaining nutritional needs  Description: Monitor and assess patient's nutrition/hydration status for malnutrition  Collaborate with interdisciplinary team and initiate plan and interventions as ordered  Monitor patient's weight and dietary intake as ordered or per policy  Utilize nutrition screening tool and intervene as necessary  Determine patient's food preferences and provide high-protein, high-caloric foods as appropriate       INTERVENTIONS:  - Monitor oral intake, urinary output, labs, and treatment plans  - Assess nutrition and hydration status and recommend course of action  - Evaluate amount of meals eaten  - Assist patient with eating if necessary   - Allow adequate time for meals  - Recommend/ encourage appropriate diets, oral nutritional supplements, and vitamin/mineral supplements  - Order, calculate, and assess calorie counts as needed  - Recommend, monitor, and adjust tube feedings and TPN/PPN based on assessed needs  - Assess need for intravenous fluids  - Provide specific nutrition/hydration education as appropriate  - Include patient/family/caregiver in decisions related to nutrition  Outcome: Progressing     Problem: Prexisting or High Potential for Compromised Skin Integrity  Goal: Skin integrity is maintained or improved  Description: INTERVENTIONS:  - Identify patients at risk for skin breakdown  - Assess and monitor skin integrity  - Assess and monitor nutrition and hydration status  - Monitor labs   - Turn and reposition patient  - Assist with mobility/ambulation  - Relieve pressure over bony prominences  - Avoid friction and shearing  - Provide appropriate hygiene as needed including keeping skin clean and dry  - Evaluate need for skin moisturizer/barrier cream  - Collaborate with interdisciplinary team   - Patient/family teaching    Outcome: Progressing     Problem: Potential for Falls  Goal: Patient will remain free of falls  Description: INTERVENTIONS:  - Educate patient/family on patient safety including physical limitations  - Instruct patient to call for assistance with activity   - Consult OT/PT to assist with strengthening/mobility   - Keep Call bell within reach  - Keep bed low and locked with side rails adjusted as appropriate  - Keep care items and personal belongings within reach  - Initiate and maintain comfort rounds  - Make Fall Risk Sign visible to staff    - Initiate/Maintain alarm  - Obtain necessary fall risk management equipment:   - Apply yellow socks and bracelet for high fall risk patients  - Consider moving patient to room near nurses station  Outcome: Progressing     Problem: MOBILITY - ADULT  Goal: Maintain or return to baseline ADL function  Description: INTERVENTIONS:  - Educate patient/family on patient safety including physical limitations  - Instruct patient to call for assistance with activity   - Consult OT/PT to assist with strengthening/mobility   - Keep Call bell within reach  - Keep bed low and locked with side rails adjusted as appropriate  - Keep care items and personal belongings within reach  - Initiate and maintain comfort rounds  - Make Fall Risk Sign visible to staff    - Apply yellow socks and bracelet for high fall risk patients  - Consider moving patient to room near nurses station  Outcome: Progressing     Problem: PAIN - ADULT  Goal: Verbalizes/displays adequate comfort level or baseline comfort level  Description: Interventions:  - Encourage patient to monitor pain and request assistance  - Assess pain using appropriate pain scale  - Administer analgesics based on type and severity of pain and evaluate response  - Implement non-pharmacological measures as appropriate and evaluate response  - Consider cultural and social influences on pain and pain management  - Notify physician/advanced practitioner if interventions unsuccessful or patient reports new pain  Outcome: Progressing     Problem: INFECTION - ADULT  Goal: Absence or prevention of progression during hospitalization  Description: INTERVENTIONS:  - Assess and monitor for signs and symptoms of infection  - Monitor lab/diagnostic results  - Monitor all insertion sites, i e  indwelling lines, tubes, and drains  - Monitor endotracheal if appropriate and nasal secretions for changes in amount and color  - [place] appropriate cooling/warming therapies per order  - Administer medications as ordered  - Instruct and encourage patient and family to use good hand hygiene technique  - Identify and instruct in appropriate isolation precautions for identified infection/condition  Outcome: Progressing     Problem: SAFETY ADULT  Goal: Patient will remain free of falls  Description: INTERVENTIONS:  - Educate patient/family on patient safety including physical limitations  - Instruct patient to call for assistance with activity   - Consult OT/PT to assist with strengthening/mobility   - Keep Call bell within reach  - Keep bed low and locked with side rails adjusted as appropriate  - Keep care items and personal belongings within reach  - Initiate and maintain comfort rounds  - Make Fall Risk Sign visible to staff    - Initiate/Maintain alarm  - Obtain necessary fall risk management equipment:   - Apply yellow socks and bracelet for high fall risk patients  - Consider moving patient to room near nurses station  Outcome: Progressing  Goal: Maintain or return to baseline ADL function  Description: INTERVENTIONS:  - Educate patient/family on patient safety including physical limitations  - Instruct patient to call for assistance with activity   - Consult OT/PT to assist with strengthening/mobility   - Keep Call bell within reach  - Keep bed low and locked with side rails adjusted as appropriate  - Keep care items and personal belongings within reach  - Initiate and maintain comfort rounds  - Make Fall Risk Sign visible to staff    - Apply yellow socks and bracelet for high fall risk patients  - Consider moving patient to room near nurses station  Outcome: Progressing  Goal: Maintains/Returns to pre admission functional level  Description: INTERVENTIONS:  - Perform BMAT or MOVE assessment daily    - Set and communicate daily mobility goal to care team and patient/family/caregiver  - Collaborate with rehabilitation services on mobility goals if consulted  - Perform Range of Motion 3 times a day  - Reposition patient every 2 hours  - Dangle patient 3 times a day  - Stand patient 3 times a day  - Ambulate patient 3 times a day  - Out of bed to chair 3 times a day   - Out of bed for meals 3 times a day  - Out of bed for toileting  - Record patient progress and toleration of activity level   Outcome: Progressing     Problem: DISCHARGE PLANNING  Goal: Discharge to home or other facility with appropriate resources  Description: INTERVENTIONS:  - Identify barriers to discharge w/patient and caregiver  - Arrange for needed discharge resources and transportation as appropriate  - Identify discharge learning needs (meds, wound care, etc )  - Arrange for interpretive services to assist at discharge as needed  - Refer to Case Management Department for coordinating discharge planning if the patient needs post-hospital services based on physician/advanced practitioner order or complex needs related to functional status, cognitive ability, or social support system  Outcome: Progressing     Problem: Knowledge Deficit  Goal: Patient/family/caregiver demonstrates understanding of disease process, treatment plan, medications, and discharge instructions  Description: Complete learning assessment and assess knowledge base    Interventions:  - Provide teaching at level of understanding  - Provide teaching via preferred learning methods  Outcome: Progressing     Problem: CARDIOVASCULAR - ADULT  Goal: Maintains optimal cardiac output and hemodynamic stability  Description: INTERVENTIONS:  - Monitor I/O, vital signs and rhythm  - Monitor for S/S and trends of decreased cardiac output  - Assess quality of pulses, skin color and temperature  - Instruct patient to report change in severity of symptoms  Outcome: Progressing  Goal: Absence of cardiac dysrhythmias or at baseline rhythm  Description: INTERVENTIONS:  - Continuous cardiac monitoring, vital signs, obtain 12 lead EKG if ordered  - Administer antiarrhythmic and heart rate control medications as ordered  - Monitor electrolytes and administer replacement therapy as ordered  Outcome: Progressing     Problem: RESPIRATORY - ADULT  Goal: Achieves optimal ventilation and oxygenation  Description: INTERVENTIONS:  - Assess for changes in respiratory status  - Assess for changes in mentation and behavior  - Position to facilitate oxygenation and minimize respiratory effort  - Oxygen administered by appropriate delivery if ordered  - Initiate smoking cessation education as indicated  - Encourage broncho-pulmonary hygiene including cough, deep breathe, Incentive Spirometry  - Assess the need for suctioning and aspirate as needed  - Assess and instruct to report SOB or any respiratory difficulty  - Respiratory Therapy support as indicated  Outcome: Progressing

## 2022-03-13 NOTE — OCCUPATIONAL THERAPY NOTE
Occupational Therapy Note     03/13/22 0948   Note Type   Note Type Treatment   Restrictions/Precautions   Weight Bearing Precautions Per Order Yes   LUE Weight Bearing Per Order NWB   Braces or Orthoses Sling   Other Precautions Chair Alarm; Bed Alarm; Fall Risk;Pain;O2  (no AROM to L shoulder)   Pain Assessment   Pain Assessment Tool 0-10   Pain Score 6  (with movement)   Pain Location/Orientation Location: Shoulder   Pain Onset/Description Onset: Ongoing   Effect of Pain on Daily Activities   (limits tolerance for PROM during session)   ADL   Grooming Assistance 5  Supervision/Setup   Grooming Deficit   (patient now using R hand for ADL tasks ( patient is L handed)   ROM - Left Upper Extremities    L Elbow AAROM   L Wrist AROM; Wrist flexion;Wrist extension   L Hand AROM  (fisting/opening of hand exercises)   L Position Supine   L Weight/Reps/Sets   (10 reps x 2 sets for each plane of motion)   Assessment   Assessment Patient seen for OT this AM while in bed  Patient alert, cooperative and pleasant  Patient reports she is discharging to home today  Reviewed with patient L shoulder precautions- NWB and constant wearing of sling unless instructed by ortho MD  Patient tolerated L UE exercises well (no AROM to shoulder per order) with rest periods due to pain to shoulder  Instructed patient to continue with same exercises with elevation and gentle massage/hygiene to maintain skin integrity and reduce swelling  Patient showed good understanding  Patient reported that niece will be assisting once d/c to home  At end of session, patient supine in bed with all needs met, continue OT per plan of care  The patient's raw score on the AM-PAC Daily Activity inpatient short form is 15, standardized score is 34 69, less than 39 4  Patients at this level are likely to benefit from discharge to home with home health rehabilitation  Please refer to the recommendation of the Occupational Therapist for safe discharge planning     Plan Treatment Interventions ADL retraining;Functional transfer training;UE strengthening/ROM; Endurance training;Patient/family training;Equipment evaluation/education; Compensatory technique education;Continued evaluation; Activityengagement; Energy conservation   OT Frequency 5x/wk   Recommendation   OT Discharge Recommendation Home with home health rehabilitation   AM-PAC Daily Activity Inpatient   Lower Body Dressing 2   Bathing 2   Toileting 2   Upper Body Dressing 1   Grooming 4   Eating 4   Daily Activity Raw Score 15   Daily Activity Standardized Score (Calc for Raw Score >=11) 34 69   AM-PAC Applied Cognition Inpatient   Following a Speech/Presentation 4   Understanding Ordinary Conversation 4   Taking Medications 4   Remembering Where Things Are Placed or Put Away 4   Remembering List of 4-5 Errands 4   Taking Care of Complicated Tasks 4   Applied Cognition Raw Score 24   Applied Cognition Standardized Score 91 94   Licensure   [place] License Number  Rony Isabelle Shields, OTR/L [id number]

## 2022-03-13 NOTE — ASSESSMENT & PLAN NOTE
Wt Readings from Last 3 Encounters:   03/13/22 132 kg (292 lb 1 8 oz)   03/08/22 135 kg (297 lb 9 9 oz)   03/03/22 127 kg (279 lb 15 8 oz)     - SOB POA, secondary to acute on chronic diastolic CHF  BNP 5992  CT shows small b/l plerual effusions   - recent shoulder surgery, SEBASTIAN, not on diuretics at SNF (previously on torsemide 10mg/d)  - follow up repeat Echo - previously LVEF 55%  -echocardiogram performed 3/10 shows EF 00%, systolic function low normal, borderline concentric hypertrophy as per Cardiology report  - pro calcitonin negative - would hold off abx for aspiration related air space opacities seen on CT    - CTA chest negative for pulmonary embolism   Patient had developed some SOB on 3/13; repeat chest xray continues to show pulmonary congestion  Transitioned back to lasix IV with a one time albumin IV  Continue metoprolol 50 mg q 12 hours  Continue intake and output, daily weights     Discussed plan with patient and her family

## 2022-03-13 NOTE — PLAN OF CARE
Problem: Nutrition/Hydration-ADULT  Goal: Nutrient/Hydration intake appropriate for improving, restoring or maintaining nutritional needs  Description: Monitor and assess patient's nutrition/hydration status for malnutrition  Collaborate with interdisciplinary team and initiate plan and interventions as ordered  Monitor patient's weight and dietary intake as ordered or per policy  Utilize nutrition screening tool and intervene as necessary  Determine patient's food preferences and provide high-protein, high-caloric foods as appropriate       INTERVENTIONS:  - Monitor oral intake, urinary output, labs, and treatment plans  - Assess nutrition and hydration status and recommend course of action  - Evaluate amount of meals eaten  - Assist patient with eating if necessary   - Allow adequate time for meals  - Recommend/ encourage appropriate diets, oral nutritional supplements, and vitamin/mineral supplements  - Order, calculate, and assess calorie counts as needed  - Recommend, monitor, and adjust tube feedings and TPN/PPN based on assessed needs  - Assess need for intravenous fluids  - Provide specific nutrition/hydration education as appropriate  - Include patient/family/caregiver in decisions related to nutrition  Outcome: Progressing     Problem: Prexisting or High Potential for Compromised Skin Integrity  Goal: Skin integrity is maintained or improved  Description: INTERVENTIONS:  - Identify patients at risk for skin breakdown  - Assess and monitor skin integrity  - Assess and monitor nutrition and hydration status  - Monitor labs   - Turn and reposition patient  - Assist with mobility/ambulation  - Relieve pressure over bony prominences  - Avoid friction and shearing  - Provide appropriate hygiene as needed including keeping skin clean and dry  - Evaluate need for skin moisturizer/barrier cream  - Collaborate with interdisciplinary team   - Patient/family teaching    Outcome: Progressing     Problem: Potential for Falls  Goal: Patient will remain free of falls  Description: INTERVENTIONS:  - Educate patient/family on patient safety including physical limitations  - Instruct patient to call for assistance with activity   - Consult OT/PT to assist with strengthening/mobility   - Keep Call bell within reach  - Keep bed low and locked with side rails adjusted as appropriate  - Keep care items and personal belongings within reach  - Initiate and maintain comfort rounds  - Make Fall Risk Sign visible to staff    - Initiate/Maintain alarm  - Obtain necessary fall risk management equipment:   - Apply yellow socks and bracelet for high fall risk patients  - Consider moving patient to room near nurses station  Outcome: Progressing     Problem: MOBILITY - ADULT  Goal: Maintain or return to baseline ADL function  Description: INTERVENTIONS:  - Educate patient/family on patient safety including physical limitations  - Instruct patient to call for assistance with activity   - Consult OT/PT to assist with strengthening/mobility   - Keep Call bell within reach  - Keep bed low and locked with side rails adjusted as appropriate  - Keep care items and personal belongings within reach  - Initiate and maintain comfort rounds  - Make Fall Risk Sign visible to staff    - Apply yellow socks and bracelet for high fall risk patients  - Consider moving patient to room near nurses station  Outcome: Progressing  Goal: Maintains/Returns to pre admission functional level  Description: INTERVENTIONS:  - Perform BMAT or MOVE assessment daily    - Set and communicate daily mobility goal to care team and patient/family/caregiver  - Collaborate with rehabilitation services on mobility goals if consulted  - Perform Range of Motion 3 times a day  - Reposition patient every 2 hours    - Dangle patient 3 times a day  - Stand patient 3 times a day  - Ambulate patient 3 times a day  - Out of bed to chair 3 times a day   - Out of bed for meals 3 times a day  - Out of bed for toileting  - Record patient progress and toleration of activity level   Outcome: Progressing     Problem: PAIN - ADULT  Goal: Verbalizes/displays adequate comfort level or baseline comfort level  Description: Interventions:  - Encourage patient to monitor pain and request assistance  - Assess pain using appropriate pain scale  - Administer analgesics based on type and severity of pain and evaluate response  - Implement non-pharmacological measures as appropriate and evaluate response  - Consider cultural and social influences on pain and pain management  - Notify physician/advanced practitioner if interventions unsuccessful or patient reports new pain  Outcome: Progressing     Problem: INFECTION - ADULT  Goal: Absence or prevention of progression during hospitalization  Description: INTERVENTIONS:  - Assess and monitor for signs and symptoms of infection  - Monitor lab/diagnostic results  - Monitor all insertion sites, i e  indwelling lines, tubes, and drains  - Monitor endotracheal if appropriate and nasal secretions for changes in amount and color  - [place] appropriate cooling/warming therapies per order  - Administer medications as ordered  - Instruct and encourage patient and family to use good hand hygiene technique  - Identify and instruct in appropriate isolation precautions for identified infection/condition  Outcome: Progressing     Problem: SAFETY ADULT  Goal: Patient will remain free of falls  Description: INTERVENTIONS:  - Educate patient/family on patient safety including physical limitations  - Instruct patient to call for assistance with activity   - Consult OT/PT to assist with strengthening/mobility   - Keep Call bell within reach  - Keep bed low and locked with side rails adjusted as appropriate  - Keep care items and personal belongings within reach  - Initiate and maintain comfort rounds  - Make Fall Risk Sign visible to staff    - Initiate/Maintain alarm  - Obtain necessary fall risk management equipment:   - Apply yellow socks and bracelet for high fall risk patients  - Consider moving patient to room near nurses station  Outcome: Progressing  Goal: Maintain or return to baseline ADL function  Description: INTERVENTIONS:  - Educate patient/family on patient safety including physical limitations  - Instruct patient to call for assistance with activity   - Consult OT/PT to assist with strengthening/mobility   - Keep Call bell within reach  - Keep bed low and locked with side rails adjusted as appropriate  - Keep care items and personal belongings within reach  - Initiate and maintain comfort rounds  - Make Fall Risk Sign visible to staff    - Apply yellow socks and bracelet for high fall risk patients  - Consider moving patient to room near nurses station  Outcome: Progressing  Goal: Maintains/Returns to pre admission functional level  Description: INTERVENTIONS:  - Perform BMAT or MOVE assessment daily    - Set and communicate daily mobility goal to care team and patient/family/caregiver  - Collaborate with rehabilitation services on mobility goals if consulted  - Perform Range of Motion 2 times a day  - Reposition patient every 3 hours    - Dangle patient 3 times a day  - Stand patient 3 times a day  - Ambulate patient 3 times a day  - Out of bed to chair 3 times a day   - Out of bed for meals 3 times a day  - Out of bed for toileting  - Record patient progress and toleration of activity level   Outcome: Progressing     Problem: DISCHARGE PLANNING  Goal: Discharge to home or other facility with appropriate resources  Description: INTERVENTIONS:  - Identify barriers to discharge w/patient and caregiver  - Arrange for needed discharge resources and transportation as appropriate  - Identify discharge learning needs (meds, wound care, etc )  - Arrange for interpretive services to assist at discharge as needed  - Refer to Case Management Department for coordinating discharge planning if the patient needs post-hospital services based on physician/advanced practitioner order or complex needs related to functional status, cognitive ability, or social support system  Outcome: Progressing     Problem: Knowledge Deficit  Goal: Patient/family/caregiver demonstrates understanding of disease process, treatment plan, medications, and discharge instructions  Description: Complete learning assessment and assess knowledge base    Interventions:  - Provide teaching at level of understanding  - Provide teaching via preferred learning methods  Outcome: Progressing     Problem: CARDIOVASCULAR - ADULT  Goal: Maintains optimal cardiac output and hemodynamic stability  Description: INTERVENTIONS:  - Monitor I/O, vital signs and rhythm  - Monitor for S/S and trends of decreased cardiac output  - Assess quality of pulses, skin color and temperature  - Instruct patient to report change in severity of symptoms  Outcome: Progressing  Goal: Absence of cardiac dysrhythmias or at baseline rhythm  Description: INTERVENTIONS:  - Continuous cardiac monitoring, vital signs, obtain 12 lead EKG if ordered  - Administer antiarrhythmic and heart rate control medications as ordered  - Monitor electrolytes and administer replacement therapy as ordered  Outcome: Progressing     Problem: RESPIRATORY - ADULT  Goal: Achieves optimal ventilation and oxygenation  Description: INTERVENTIONS:  - Assess for changes in respiratory status  - Assess for changes in mentation and behavior  - Position to facilitate oxygenation and minimize respiratory effort  - Oxygen administered by appropriate delivery if ordered  - Initiate smoking cessation education as indicated  - Encourage broncho-pulmonary hygiene including cough, deep breathe, Incentive Spirometry  - Assess the need for suctioning and aspirate as needed  - Assess and instruct to report SOB or any respiratory difficulty  - Respiratory Therapy support as indicated  Outcome: Progressing

## 2022-03-13 NOTE — ASSESSMENT & PLAN NOTE
Lab Results   Component Value Date    HGBA1C 6 8 (H) 03/09/2022     Recent Labs     03/12/22  1057 03/12/22  1658 03/12/22 2016 03/13/22  0614   POCGLU 112 191* 249* 167*     Blood Sugar Average: Last 72 hrs:  (P) 192 [id number]   - on Lantus 20 units SQ daily   - A1c indicated good control   Continue sliding scale coverage

## 2022-03-13 NOTE — PROGRESS NOTES
Cardiology Inpatient Progress Note  [facility]   Niharika Boss  1950  [id number]  PCP: Spring Lantigua  Date of Admission:  3/9/2022    Assessment/Plan:   Acute on chronic diastolic heart failure- we do not have accurate weights  I discussed with nursing staff  We will try to stand her up for a standing weight  Possible 3rd spacing secondary to low albumin  We will restart aggressive IV diuresis  Patient still markedly dyspneic  Change to IV Lasix 60 mg with albumin twice a day  Daily standing weight  Patient was still significant lower extremity swelling  Chest x-ray concerning for continued pulmonary vascular congestion  Her blood pressures are suboptimally controlled  Possible addition of hydralazine with her amlodipine  Atrial fibrillation- currently in sinus rhythm  Continue metoprolol 50 mg twice a day plus Eliquis 5 mg    Status post reverse total replacement left shoulder    Subjective:   Still feels significantly short of breath  She is currently on a oxygen    Review of Systems:   CONSTITUTIONAL:  As per hpi  HEENT:  Eyes:  No visual loss, blurred vision, double vision or yellow sclerae  Ears, Nose, Throat:  No hearing loss, sneezing, congestion, runny nose or sore throat  SKIN:  No rash or itching  CARDIOVASCULAR:  As per hpi  RESPIRATORY:  As per hpi  GASTROINTESTINAL:  No anorexia, nausea, vomiting or diarrhea  No abdominal pain or blood  GENITOURINARY:  Burning on urination  No flank pain  NEUROLOGICAL:  No headache, dizziness, syncope, paralysis, ataxia, numbness or tingling in the extremities  No change in bowel or bladder control  MUSCULOSKELETAL:  No muscle, back pain, joint pain or stiffness  HEMATOLOGIC:  No anemia, bleeding or bruising  LYMPHATICS:  No enlarged nodes  No history of splenectomy  PSYCHIATRIC:  No active suicidal or homicidal ideation  ENDOCRINOLOGIC:  No reports of sweating, cold or heat intolerance   No polyuria or polydipsia  ALLERGIES:  No history of asthma, hives, eczema or rhinitis  More than 10 systems reviewed and otherwise noncontributory  Vitals: Blood pressure (!) 178/83, pulse 75, temperature 97 6 °F (36 4 °C), resp  rate 18, height 5' 2" (1 575 m), weight 132 kg (292 lb 1 8 oz), SpO2 94 %, not currently breastfeeding  Vitals:    03/12/22 0600 03/13/22 0500   Weight: 133 kg (294 lb 3 2 oz) 132 kg (292 lb 1 8 oz)     Body mass index is 53 43 kg/m²  BP Readings from Last 3 Encounters:   03/13/22 (!) 178/83   03/09/22 144/66   03/07/22 164/69     Orthostatic Blood Pressures      Most Recent Value   Blood Pressure 178/83 filed at 03/13/2022 1519   Patient Position - Orthostatic VS Lying filed at 03/12/2022 2250        I/O       03/11 0701  03/12 0700 03/12 0701  03/13 0700 03/13 0701  03/14 0700    P  O  775 840     I V  (mL/kg) 10 (0 1)      Total Intake(mL/kg) 785 (5 9) 840 (6 4)     Urine (mL/kg/hr) 1050 (0 3) 1800 (0 6) 650 (0 5)    Total Output 1050 1800 650    Net -265 -960 -650               Invasive Devices  Report    Peripheral Intravenous Line            Peripheral IV 03/09/22 Right;Ventral (anterior) Forearm 3 days          Drain            External Urinary Catheter <1 day                  Intake/Output Summary (Last 24 hours) at 3/13/2022 1731  Last data filed at 3/13/2022 1143  Gross per 24 hour   Intake 240 ml   Output 1400 ml   Net -1160 ml     Physical Examination:   Physical Exam  Constitutional:       General: She is not in acute distress  Appearance: She is well-developed  She is not diaphoretic  HENT:      Head: Normocephalic and atraumatic  Right Ear: External ear normal       Left Ear: External ear normal    Eyes:      General: No scleral icterus  Right eye: No discharge  Left eye: No discharge  Conjunctiva/sclera: Conjunctivae normal       Pupils: Pupils are equal, round, and reactive to light  Neck:      Thyroid: No thyromegaly  Vascular: No JVD  Trachea: No tracheal deviation  Cardiovascular:      Rate and Rhythm: Normal rate and regular rhythm  Heart sounds: Murmur heard  No friction rub  Gallop present  Pulmonary:      Effort: Pulmonary effort is normal  No respiratory distress  Breath sounds: Normal breath sounds  No stridor  No wheezing or rales  Chest:      Chest wall: No tenderness  Abdominal:      General: Bowel sounds are normal  There is distension  Palpations: Abdomen is soft  There is no mass  Tenderness: There is no abdominal tenderness  There is no guarding or rebound  Musculoskeletal:         General: Swelling and signs of injury present  No tenderness or deformity  Normal range of motion  Cervical back: Normal range of motion and neck supple  Skin:     General: Skin is warm and dry  Coloration: Skin is not pale  Findings: No erythema or rash  Neurological:      Mental Status: She is alert and oriented to person, place, and time  Cranial Nerves: No cranial nerve deficit  Motor: No abnormal muscle tone  Coordination: Coordination normal       Deep Tendon Reflexes: Reflexes are normal and symmetric  Reflexes normal    Psychiatric:         Behavior: Behavior normal          Thought Content: Thought content normal          Judgment: Judgment normal          Labs:   Lab Results:  I Have Reviewed All Lab Data Below:  CBC with diff:  Results from last 7 days   Lab Units 03/13/22 0445 03/10/22  0513 03/09/22  1830 03/08/22  1736 03/07/22  0543   WBC Thousand/uL 8 23 6 19 6 19 6 02 6 85   HEMOGLOBIN g/dL 8 7* 8 9* 9 2* 8 8* 8 8*   HEMATOCRIT % 29 7* 30 9* 31 7* 29 9* 29 5*   MCV fL 97 99* 96 96 96   PLATELETS Thousands/uL 253 300 356 331 297   MCH pg 28 4 28 4 28 0 28 3 28 6   MCHC g/dL 29 3* 28 8* 29 0* 29 4* 29 8*   RDW % 15 5* 15 7* 15 5* 15 8* 15 9*   MPV fL 10 1 9 9 10 0 9 8 9 8   NRBC AUTO /100 WBCs  --  0 0 0  --        CMP:  Results from last 7 days   Lab Units 03/13/22 0445 22  0426 03/10/22  0513 22  1830 22  1736 22  0543   SODIUM mmol/L 143 144 146* 146* 145 143   POTASSIUM mmol/L 4 5 4 3 4 2 4 6 4 6 4 3   CHLORIDE mmol/L 106 106 107 108 107 106   CO2 mmol/L 33* 34* 32 31 27 30   ANION GAP mmol/L 4 4 7 7 11 7   BUN mg/dL 33* 32* 30* 32* 37* 48*   CREATININE mg/dL 1 85* 1 78* 1 24 1 14 1 24 1 52*   CALCIUM mg/dL 7 6* 7 6* 8 2* 8 4 8 2* 8 4   AST U/L  --   --  14 13 22  --    ALT U/L  --   --  18 17 18  --    ALK PHOS U/L  --   --  79 84 83  --    TOTAL PROTEIN g/dL  --   --  6 3* 6 2* 6 2*  --    ALBUMIN g/dL  --   --  2 5* 2 4* 2 3*  --    TOTAL BILIRUBIN mg/dL  --   --  0 32 0 41 0 52  --    EGFR ml/min/1 73sq m 27 28 44 49 44 34     Magnesium:  Results from last 7 days   Lab Units 03/10/22  0513 22  1830 22  1736   MAGNESIUM mg/dL 2 1 2 3 2 5       Coags:  Results from last 7 days   Lab Units 03/10/22  0513 22  1830 22  1736   PTT seconds 40* 37 41*   INR  1 34* 1 22* 1 36*     TSH:  Results from last 7 days   Lab Units 22  1830   TSH 3RD GENERATON uIU/mL 3 550     Lipid Profile:  Results from last 7 days   Lab Units 03/10/22  0513   CHOLESTEROL mg/dL 104   TRIGLYCERIDES mg/dL 132   HDL mg/dL 30*   LDL CALC mg/dL 48     Hgb A1c:  Results from last 7 days   Lab Units 22  1830   HEMOGLOBIN A1C % 6 8*     NT-proBNP: No results for input(s): NTBNP in the last 72 hours  Troponins:      Imaging & Testing   I have personally reviewed pertinent reports        Cardiac Testing   Results for orders placed during the hospital encounter of 19    Echo complete with contrast if indicated    Narrative  [address]  [address] [facility]  [phone]    Transthoracic Echocardiogram  2D, M-mode, Doppler, and Color Doppler    Study date:  29-May-2019    Patient: Gely Aguillon  MR number: [id number]  Account number: [de-identified]  : 1950  Age: 79 years  Gender: Female  Status: Inpatient  Location: Bedside  Height: 62 in  Weight: 250 6 lb  BP: 133/ 62 mmHg    Indications: Tachycardia    Diagnoses: I11 9 - Hypertensive heart disease without heart failure    Sonographer:  QUETA Boogie  Referring Physician:  Ariel Auguste MD  Group[facility]  [facility]  Interpreting Physician:  Warden Lzibet DO    SUMMARY    LEFT VENTRICLE:  Systolic function was normal by visual assessment  Ejection fraction was estimated to be 55 %  There were no regional wall motion abnormalities  Wall thickness was mildly to moderately increased  Doppler parameters were consistent with abnormal left ventricular relaxation (grade 1 diastolic dysfunction)  MITRAL VALVE:  There was mild regurgitation  AORTIC VALVE:  There was no evidence for stenosis  There was no regurgitation  TRICUSPID VALVE:  There was mild regurgitation  Pulmonary artery systolic pressure was within the normal range  HISTORY: PRIOR HISTORY: Diabetes,Diabetes, HTN, Hyperlipidemia, A Fib  PROCEDURE: The procedure was performed at the bedside  This was a routine study  The transthoracic approach was used  The study included complete 2D imaging, M-mode, complete spectral Doppler, and color Doppler  The heart rate was 77 bpm,  at the start of the study  Images were obtained from the parasternal, apical, subcostal, and suprasternal notch acoustic windows  Echocardiographic views were limited due to restricted patient mobility, poor patient compliance, poor  acoustic window availability, decreased penetration, and lung interference  This was a technically difficult study  LEFT VENTRICLE: Size was normal  Systolic function was normal by visual assessment  Ejection fraction was estimated to be 55 %  There were no regional wall motion abnormalities  Wall thickness was mildly to moderately increased   DOPPLER:  Doppler parameters were consistent with abnormal left ventricular relaxation (grade 1 diastolic dysfunction)  RIGHT VENTRICLE: The size was normal  Systolic function was normal     LEFT ATRIUM: The atrium was mildly dilated  RIGHT ATRIUM: Size was normal     MITRAL VALVE: Valve structure was normal  There was normal leaflet separation  No echocardiographic evidence for prolapse  DOPPLER: The transmitral velocity was within the normal range  There was no evidence for stenosis  There was mild  regurgitation  AORTIC VALVE: The valve was trileaflet  Leaflets exhibited normal thickness, normal cuspal separation, and sclerosis  DOPPLER: Transaortic velocity was within the normal range  There was no evidence for stenosis  There was no  regurgitation  TRICUSPID VALVE: The valve structure was normal  There was normal leaflet separation  DOPPLER: The transtricuspid velocity was within the normal range  There was mild regurgitation  Pulmonary artery systolic pressure was within the normal  range  Estimated peak PA pressure was 32 mmHg  PULMONIC VALVE: Leaflets exhibited normal thickness, no calcification, and normal cuspal separation  DOPPLER: The transpulmonic velocity was within the normal range  There was no regurgitation  PERICARDIUM: There was no thickening  There was no pericardial effusion  AORTA: The root exhibited normal size  PULMONARY ARTERY: The size was normal  The morphology appeared normal     SYSTEM MEASUREMENT TABLES    2D mode  AoR Diam 2D: 3 6 cm  LA Diam (2D): 3 9 cm  LA/Ao (2D): 1 08  FS (2D Teich): 26 9 %  IVSd (2D): 1 29 cm  LVDEV: 75 1 cmï¾³  LVESV: 35 3 cmï¾³  LVIDd(2D): 4 12 cm  LVISd (2D): 3 01 cm  LVOT Area 2D: 3 14 cmï¾²  LVPWd (2D): 1 2 cm  SV (Teich): 39 8 cmï¾³    Apical four chamber  LVEF A4C: 51 %    Unspecified Scan Mode  HANNA Cont Eq (Peak Gutierrez): 2 58 cmï¾²  LVOT Diam : 2 cm  LVOT Vmax: 963 mm/s  LVOT Vmax; Mean: 963 mm/s  Peak Grad ; Mean: 4 mm[Hg]  MV Peak A Gutierrez: 893 mm/s  MV Peak E Gutierrez   Mean: 805 mm/s  MVA (PHT): 3 67 cmï¾²  PHT: 60 ms  Max P mm[Hg]  V Max: 2360 mm/s  Vmax: 2430 mm/s  RA Area: 12 8 cmï¾²  RA Volume: 27 6 cmï¾³  TAPSE: 2 cm    [employer] Accredited Echocardiography Laboratory    Prepared and electronically signed by    Diana Cox DO  Signed 29-May-2019 16:19:07    No results found for this or any previous visit  No results found for this or any previous visit  No results found for this or any previous visit  No results found for this or any previous visit  Results for orders placed during the hospital encounter of 19    NM Myocardial Perfusion Spect (Pharmacological Induced Stress and/or Rest)    Narrative  [address]  [address] [facility] [address]  [phone]    Rest/Stress Gated SPECT Myocardial Perfusion Imaging After Regadenoson    Patient: Jean Morfin  MR number: [id number]  Account number: [de-identified]  : 1950  Age: 81 years  Gender: Female  Status: Inpatient  Location: Stress lab  Height: 62 in  Weight: 252 lb  BP: 149/ 72 mmHg    Allergies: ASPIRIN, INDOMETHACIN, PENICILLINS, OXYCODONE-ACETAMINOPHEN    Diagnosis: R00 1 - Bradycardia, unspecified, R55  - Syncope and collapse    Primary Physician:  Juan Ramon Montoya  RN:  LOYD Onofre  Group:  Rachel Peace  Interpreting Physician:  Jonelle Malloy MD    INDICATIONS: Evaluation for coronary artery disease  HISTORY: The patient is a 81year old  female  Chest pain status: no chest pain  Other symptoms: syncope with collapse  Coronary artery disease risk factors: family history of premature coronary artery disease and diabetes  mellitus  Cardiovascular history: arrhythmia  Co-morbidity: history of renal disease and obesity  Medications: an anticoagulant, a beta blocker and diabetic medications  PHYSICAL EXAM: Baseline physical exam screening: no wheezes audible  REST ECG: Sinus bradycardia  PROCEDURE: The study was performed in the the Stress lab   A regadenoson infusion pharmacologic stress test was performed  Gated SPECT myocardial perfusion imaging was performed after stress and at rest  Systolic blood pressure was 149  mmHg, at the start of the study  Diastolic blood pressure was 72 mmHg, at the start of the study  The heart rate was 58 bpm, at the start of the study  IV double checked  Regadenoson protocol:  Time HR bpm SBP mmHg DBP mmHg Symptoms Rhythm/conduct  Baseline 12:21 58 149 72 none sinus kenney  Immediate 12:23 112 146 65 mild dyspnea sinus tach  1 min 12:24 115 167 78 nausea same as above  2 min 12:25 109 177 80 subsiding --  3 min 12:26 102 170 77 none --  No medications or fluids given  STRESS SUMMARY: Duration of pharmacologic stress was 3 min  Maximal heart rate during stress was 116 bpm  The heart rate response to stress was normal  There was resting hypertension with an appropriate blood pressure response to stress  The rate-pressure product for the peak heart rate and blood pressure was 76812  There was no chest pain during stress  The stress test was terminated due to protocol completion  Pre oxygen saturation: 100 %  Peak oxygen saturation: 100 %  The stress ECG was negative for ischemia and normal  There were no stress arrhythmias or conduction abnormalities  ISOTOPE ADMINISTRATION:  Resting isotope administration Stress isotope administration  Agent Tetrofosmin Tetrofosmin  Dose 15 2 mCi 454 2 mCi  Date 07/31/2019 --  Injection time 10:35 12:20    The radiopharmaceutical was injected at the peak effect of pharmacologic stress  MYOCARDIAL PERFUSION IMAGING:  Left ventricular size was normal  The TID ratio was 0 99  PERFUSION DEFECTS:  -  There was a small, mildly severe, fixed myocardial perfusion defect of the apical apical and anterior wall likely due to attenuation from breast tissue  GATED SPECT:  The calculated left ventricular ejection fraction was 74 %  Left ventricular ejection fraction was within normal limits by visual estimate   There was no left ventricular regional abnormality  SUMMARY:  -  Stress results: There was resting hypertension with an appropriate blood pressure response to stress  There was no chest pain during stress  -  ECG conclusions: The stress ECG was negative for ischemia and normal   -  Perfusion imaging: There was a small, mildly severe, fixed myocardial perfusion defect of the apical apical and anterior wall likely due to attenuation from breast tissue   -  Gated SPECT: The calculated left ventricular ejection fraction was 74 %  Left ventricular ejection fraction was within normal limits by visual estimate  There was no left ventricular regional abnormality  IMPRESSIONS: Probably normal study after pharmacologic vasodilation  mils apical defect, most likely due to body attenuation artifact  There was image artifact, without diagnostic evidence for perfusion abnormality  Left ventricular  systolic function was normal     Prepared and signed by    Roselyn Claire MD  Signed 07/31/2019 15:24:47      EKG: Personally reviewed  Counseling :  Counseling / Coordination of Care Time: 40min  Greater than 50% of total time spent on patient counseling and coordination of care  Code Status: Level 1 - Full Code  Collaboration of Care: Were Recommendations Directly Discussed with Primary Treatment Team? - Yes     Angel Orona MD [facility]    "This note has been constructed using a voice recognition system  Therefore there may be syntax, spelling, and/or grammatical errors   Please call if you have any questions  "

## 2022-03-13 NOTE — ASSESSMENT & PLAN NOTE
- s/p fall resulting in Comminuted displaced fracture of the proximal humerus (2/23/22)    - s/p left shoulder reverse complete arthroplasty (3/1/22)   - Cleared from ortho standpoint for patient to start Eliquis   - LUE venous duplex is negative for DVT  - PT / OT consults appreciated; recommending home with therapy

## 2022-03-14 LAB
ALBUMIN SERPL BCP-MCNC: 2.6 G/DL (ref 3.5–5)
ALP SERPL-CCNC: 87 U/L (ref 46–116)
ALT SERPL W P-5'-P-CCNC: 20 U/L (ref 12–78)
ANION GAP SERPL CALCULATED.3IONS-SCNC: 0 MMOL/L (ref 4–13)
AST SERPL W P-5'-P-CCNC: 16 U/L (ref 5–45)
BILIRUB DIRECT SERPL-MCNC: 0.11 MG/DL (ref 0–0.2)
BILIRUB SERPL-MCNC: 0.29 MG/DL (ref 0.2–1)
BUN SERPL-MCNC: 34 MG/DL (ref 5–25)
CALCIUM SERPL-MCNC: 7.6 MG/DL (ref 8.3–10.1)
CHLORIDE SERPL-SCNC: 105 MMOL/L (ref 100–108)
CO2 SERPL-SCNC: 38 MMOL/L (ref 21–32)
CREAT SERPL-MCNC: 1.89 MG/DL (ref 0.6–1.3)
ERYTHROCYTE [DISTWIDTH] IN BLOOD BY AUTOMATED COUNT: 15.5 % (ref 11.6–15.1)
GFR SERPL CREATININE-BSD FRML MDRD: 26 ML/MIN/1.73SQ M
GLUCOSE SERPL-MCNC: 158 MG/DL (ref 65–140)
GLUCOSE SERPL-MCNC: 159 MG/DL (ref 65–140)
GLUCOSE SERPL-MCNC: 164 MG/DL (ref 65–140)
GLUCOSE SERPL-MCNC: 206 MG/DL (ref 65–140)
GLUCOSE SERPL-MCNC: 238 MG/DL (ref 65–140)
HCT VFR BLD AUTO: 29.6 % (ref 34.8–46.1)
HGB BLD-MCNC: 8.8 G/DL (ref 11.5–15.4)
MCH RBC QN AUTO: 29.1 PG (ref 26.8–34.3)
MCHC RBC AUTO-ENTMCNC: 29.7 G/DL (ref 31.4–37.4)
MCV RBC AUTO: 98 FL (ref 82–98)
PLATELET # BLD AUTO: 223 THOUSANDS/UL (ref 149–390)
PMV BLD AUTO: 10.2 FL (ref 8.9–12.7)
POTASSIUM SERPL-SCNC: 4.5 MMOL/L (ref 3.5–5.3)
PROT SERPL-MCNC: 5.9 G/DL (ref 6.4–8.2)
RBC # BLD AUTO: 3.02 MILLION/UL (ref 3.81–5.12)
SODIUM SERPL-SCNC: 143 MMOL/L (ref 136–145)
WBC # BLD AUTO: 7.82 THOUSAND/UL (ref 4.31–10.16)

## 2022-03-14 PROCEDURE — 82948 REAGENT STRIP/BLOOD GLUCOSE: CPT

## 2022-03-14 PROCEDURE — 97530 THERAPEUTIC ACTIVITIES: CPT

## 2022-03-14 PROCEDURE — 99223 1ST HOSP IP/OBS HIGH 75: CPT | Performed by: STUDENT IN AN ORGANIZED HEALTH CARE EDUCATION/TRAINING PROGRAM

## 2022-03-14 PROCEDURE — 80048 BASIC METABOLIC PNL TOTAL CA: CPT | Performed by: NURSE PRACTITIONER

## 2022-03-14 PROCEDURE — 85027 COMPLETE CBC AUTOMATED: CPT | Performed by: NURSE PRACTITIONER

## 2022-03-14 PROCEDURE — 97535 SELF CARE MNGMENT TRAINING: CPT

## 2022-03-14 PROCEDURE — 99232 SBSQ HOSP IP/OBS MODERATE 35: CPT | Performed by: INTERNAL MEDICINE

## 2022-03-14 PROCEDURE — 97110 THERAPEUTIC EXERCISES: CPT

## 2022-03-14 PROCEDURE — 99232 SBSQ HOSP IP/OBS MODERATE 35: CPT | Performed by: PHYSICIAN ASSISTANT

## 2022-03-14 PROCEDURE — 80076 HEPATIC FUNCTION PANEL: CPT | Performed by: PHYSICIAN ASSISTANT

## 2022-03-14 RX ADMIN — PREGABALIN 100 MG: 100 CAPSULE ORAL at 08:09

## 2022-03-14 RX ADMIN — ALBUMIN (HUMAN) 12.5 G: 12.5 INJECTION, SOLUTION INTRAVENOUS at 17:09

## 2022-03-14 RX ADMIN — INSULIN LISPRO 1 UNITS: 100 INJECTION, SOLUTION INTRAVENOUS; SUBCUTANEOUS at 08:07

## 2022-03-14 RX ADMIN — MONTELUKAST 10 MG: 10 TABLET, FILM COATED ORAL at 21:03

## 2022-03-14 RX ADMIN — APIXABAN 5 MG: 5 TABLET, FILM COATED ORAL at 17:08

## 2022-03-14 RX ADMIN — INSULIN LISPRO 1 UNITS: 100 INJECTION, SOLUTION INTRAVENOUS; SUBCUTANEOUS at 21:04

## 2022-03-14 RX ADMIN — TRAMADOL HYDROCHLORIDE 50 MG: 50 TABLET, COATED ORAL at 00:13

## 2022-03-14 RX ADMIN — INSULIN LISPRO 1 UNITS: 100 INJECTION, SOLUTION INTRAVENOUS; SUBCUTANEOUS at 12:09

## 2022-03-14 RX ADMIN — ONDANSETRON 4 MG: 2 INJECTION INTRAMUSCULAR; INTRAVENOUS at 00:13

## 2022-03-14 RX ADMIN — INSULIN LISPRO 2 UNITS: 100 INJECTION, SOLUTION INTRAVENOUS; SUBCUTANEOUS at 17:10

## 2022-03-14 RX ADMIN — PREGABALIN 100 MG: 100 CAPSULE ORAL at 17:08

## 2022-03-14 RX ADMIN — LORATADINE 10 MG: 10 TABLET ORAL at 08:09

## 2022-03-14 RX ADMIN — ALBUMIN (HUMAN) 12.5 G: 12.5 INJECTION, SOLUTION INTRAVENOUS at 08:07

## 2022-03-14 RX ADMIN — NYSTATIN: 100000 POWDER TOPICAL at 08:07

## 2022-03-14 RX ADMIN — TRAMADOL HYDROCHLORIDE 50 MG: 50 TABLET, COATED ORAL at 21:28

## 2022-03-14 RX ADMIN — FUROSEMIDE 60 MG: 10 INJECTION, SOLUTION INTRAMUSCULAR; INTRAVENOUS at 17:09

## 2022-03-14 RX ADMIN — SODIUM FERRIC GLUCONATE COMPLEX 125 MG: 12.5 INJECTION INTRAVENOUS at 08:13

## 2022-03-14 RX ADMIN — METOPROLOL TARTRATE 50 MG: 50 TABLET, FILM COATED ORAL at 08:09

## 2022-03-14 RX ADMIN — Medication: at 08:07

## 2022-03-14 RX ADMIN — APIXABAN 5 MG: 5 TABLET, FILM COATED ORAL at 08:09

## 2022-03-14 RX ADMIN — ONDANSETRON 4 MG: 2 INJECTION INTRAMUSCULAR; INTRAVENOUS at 21:28

## 2022-03-14 RX ADMIN — INSULIN GLARGINE 20 UNITS: 100 INJECTION, SOLUTION SUBCUTANEOUS at 21:05

## 2022-03-14 RX ADMIN — Medication: at 17:11

## 2022-03-14 RX ADMIN — METOPROLOL TARTRATE 50 MG: 50 TABLET, FILM COATED ORAL at 21:03

## 2022-03-14 RX ADMIN — AMLODIPINE BESYLATE 5 MG: 5 TABLET ORAL at 08:09

## 2022-03-14 RX ADMIN — NYSTATIN: 100000 POWDER TOPICAL at 17:11

## 2022-03-14 RX ADMIN — FUROSEMIDE 60 MG: 10 INJECTION, SOLUTION INTRAMUSCULAR; INTRAVENOUS at 08:08

## 2022-03-14 RX ADMIN — LIDOCAINE 5% 2 PATCH: 700 PATCH TOPICAL at 08:08

## 2022-03-14 RX ADMIN — PRAVASTATIN SODIUM 80 MG: 80 TABLET ORAL at 17:09

## 2022-03-14 NOTE — ASSESSMENT & PLAN NOTE
· On arrival rapid a fib with RVR, responded to IV lopressor - continue 5 mg IV prn   · Continue metoprolol tartrate 50 mg PO BID at home   · DEMETRIS Vasc score 3, Dr Alyssa Roth cleared to resume Eliquis    · Continue Eliquis 5 mg p o  B i d

## 2022-03-14 NOTE — ASSESSMENT & PLAN NOTE
Lab Results   Component Value Date    HGBA1C 6 8 (H) 03/09/2022     Recent Labs     03/13/22  1113 03/13/22  1543 03/13/22 2019 03/14/22  0713   POCGLU 223* 199* 211* 158*     Blood Sugar Average: Last 72 hrs:  (P) 171 [id number]   · Lantus 20 units SQ daily   · A1c indicated good control   · Continue sliding scale coverage

## 2022-03-14 NOTE — PLAN OF CARE
Problem: Nutrition/Hydration-ADULT  Goal: Nutrient/Hydration intake appropriate for improving, restoring or maintaining nutritional needs  Description: Monitor and assess patient's nutrition/hydration status for malnutrition  Collaborate with interdisciplinary team and initiate plan and interventions as ordered  Monitor patient's weight and dietary intake as ordered or per policy  Utilize nutrition screening tool and intervene as necessary  Determine patient's food preferences and provide high-protein, high-caloric foods as appropriate       INTERVENTIONS:  - Monitor oral intake, urinary output, labs, and treatment plans  - Assess nutrition and hydration status and recommend course of action  - Evaluate amount of meals eaten  - Assist patient with eating if necessary   - Allow adequate time for meals  - Recommend/ encourage appropriate diets, oral nutritional supplements, and vitamin/mineral supplements  - Order, calculate, and assess calorie counts as needed  - Recommend, monitor, and adjust tube feedings and TPN/PPN based on assessed needs  - Assess need for intravenous fluids  - Provide specific nutrition/hydration education as appropriate  - Include patient/family/caregiver in decisions related to nutrition  Outcome: Progressing     Problem: Prexisting or High Potential for Compromised Skin Integrity  Goal: Skin integrity is maintained or improved  Description: INTERVENTIONS:  - Identify patients at risk for skin breakdown  - Assess and monitor skin integrity  - Assess and monitor nutrition and hydration status  - Monitor labs   - Turn and reposition patient  - Assist with mobility/ambulation  - Relieve pressure over bony prominences  - Avoid friction and shearing  - Provide appropriate hygiene as needed including keeping skin clean and dry  - Evaluate need for skin moisturizer/barrier cream  - Collaborate with interdisciplinary team   - Patient/family teaching    Outcome: Progressing     Problem: Potential for Falls  Goal: Patient will remain free of falls  Description: INTERVENTIONS:  - Educate patient/family on patient safety including physical limitations  - Instruct patient to call for assistance with activity   - Consult OT/PT to assist with strengthening/mobility   - Keep Call bell within reach  - Keep bed low and locked with side rails adjusted as appropriate  - Keep care items and personal belongings within reach  - Initiate and maintain comfort rounds  - Make Fall Risk Sign visible to staff    - Initiate/Maintain alarm  - Obtain necessary fall risk management equipment:   - Apply yellow socks and bracelet for high fall risk patients  - Consider moving patient to room near nurses station  Outcome: Progressing     Problem: MOBILITY - ADULT  Goal: Maintain or return to baseline ADL function  Description: INTERVENTIONS:  - Educate patient/family on patient safety including physical limitations  - Instruct patient to call for assistance with activity   - Consult OT/PT to assist with strengthening/mobility   - Keep Call bell within reach  - Keep bed low and locked with side rails adjusted as appropriate  - Keep care items and personal belongings within reach  - Initiate and maintain comfort rounds  - Make Fall Risk Sign visible to staff    - Apply yellow socks and bracelet for high fall risk patients  - Consider moving patient to room near nurses station  Outcome: Progressing  Goal: Maintains/Returns to pre admission functional level  Description: INTERVENTIONS:  - Perform BMAT or MOVE assessment daily    - Set and communicate daily mobility goal to care team and patient/family/caregiver     - Collaborate with rehabilitation services on mobility goals if consulted  - Out of bed for toileting  - Record patient progress and toleration of activity level   Outcome: Progressing     Problem: PAIN - ADULT  Goal: Verbalizes/displays adequate comfort level or baseline comfort level  Description: Interventions:  - Encourage patient to monitor pain and request assistance  - Assess pain using appropriate pain scale  - Administer analgesics based on type and severity of pain and evaluate response  - Implement non-pharmacological measures as appropriate and evaluate response  - Consider cultural and social influences on pain and pain management  - Notify physician/advanced practitioner if interventions unsuccessful or patient reports new pain  Outcome: Progressing     Problem: INFECTION - ADULT  Goal: Absence or prevention of progression during hospitalization  Description: INTERVENTIONS:  - Assess and monitor for signs and symptoms of infection  - Monitor lab/diagnostic results  - Monitor all insertion sites, i e  indwelling lines, tubes, and drains  - Monitor endotracheal if appropriate and nasal secretions for changes in amount and color  - [place] appropriate cooling/warming therapies per order  - Administer medications as ordered  - Instruct and encourage patient and family to use good hand hygiene technique  - Identify and instruct in appropriate isolation precautions for identified infection/condition  Outcome: Progressing     Problem: SAFETY ADULT  Goal: Patient will remain free of falls  Description: INTERVENTIONS:  - Educate patient/family on patient safety including physical limitations  - Instruct patient to call for assistance with activity   - Consult OT/PT to assist with strengthening/mobility   - Keep Call bell within reach  - Keep bed low and locked with side rails adjusted as appropriate  - Keep care items and personal belongings within reach  - Initiate and maintain comfort rounds  - Make Fall Risk Sign visible to staff    - Initiate/Maintain alarm  - Obtain necessary fall risk management equipment:   - Apply yellow socks and bracelet for high fall risk patients  - Consider moving patient to room near nurses station  Outcome: Progressing  Goal: Maintain or return to baseline ADL function  Description: INTERVENTIONS:  - Educate patient/family on patient safety including physical limitations  - Instruct patient to call for assistance with activity   - Consult OT/PT to assist with strengthening/mobility   - Keep Call bell within reach  - Keep bed low and locked with side rails adjusted as appropriate  - Keep care items and personal belongings within reach  - Initiate and maintain comfort rounds  - Make Fall Risk Sign visible to staff    - Apply yellow socks and bracelet for high fall risk patients  - Consider moving patient to room near nurses station  Outcome: Progressing  Goal: Maintains/Returns to pre admission functional level  Description: INTERVENTIONS:  - Perform BMAT or MOVE assessment daily    - Set and communicate daily mobility goal to care team and patient/family/caregiver  - Collaborate with rehabilitation services on mobility goals if consulted  - Perform Range of Motion 3 times a day  - Reposition patient every 2 hours    - Dangle patient 3 times a day  - Stand patient 3 times a day  - Ambulate patient 3 times a day  - Out of bed to chair 3 times a day   - Out of bed for meals 3 times a day  - Out of bed for toileting  - Record patient progress and toleration of activity level   Outcome: Progressing     Problem: DISCHARGE PLANNING  Goal: Discharge to home or other facility with appropriate resources  Description: INTERVENTIONS:  - Identify barriers to discharge w/patient and caregiver  - Arrange for needed discharge resources and transportation as appropriate  - Identify discharge learning needs (meds, wound care, etc )  - Arrange for interpretive services to assist at discharge as needed  - Refer to Case Management Department for coordinating discharge planning if the patient needs post-hospital services based on physician/advanced practitioner order or complex needs related to functional status, cognitive ability, or social support system  Outcome: Progressing     Problem: Knowledge Deficit  Goal: Patient/family/caregiver demonstrates understanding of disease process, treatment plan, medications, and discharge instructions  Description: Complete learning assessment and assess knowledge base    Interventions:  - Provide teaching at level of understanding  - Provide teaching via preferred learning methods  Outcome: Progressing

## 2022-03-14 NOTE — PHYSICAL THERAPY NOTE
PT TREATMENT     03/14/22 1140   Note Type   Note Type Treatment   Pain Assessment   Pain Assessment Tool Guan-Baker FACES   Guan-Baker FACES Pain Rating 6   Pain Location/Orientation   (L shoulder)   Restrictions/Precautions   LUE Weight Bearing Per Order NWB   Braces or Orthoses Sling   Other Precautions O2;Fall Risk;Pain   General   Chart Reviewed Yes   Cognition   Overall Cognitive Status WFL   Subjective   Subjective "I feel tired today, I 'm falling apart"   Bed Mobility   Sit to Supine 4  Minimal assistance   Additional items LE management; Increased time required   Transfers   Sit to Stand 4  Minimal assistance   Stand to Sit 4  Minimal assistance   Stand pivot 4  Minimal assistance   Additional items   (handheld assist)   Toilet transfer 4  Minimal assistance   Additional Comments pt stood x 5 minutes while bed and equipment prepared for pt  Ambulation/Elevation   Gait pattern   (wide ONUR)   Gait Assistance 4  Minimal assist   Assistive Device   (02)   Distance 2x15 feet   Balance   Static Sitting Fair +   Dynamic Sitting Fair   Static Standing Fair +   Dynamic Standing Fair   Ambulatory Fair   Activity Tolerance   Activity Tolerance Patient tolerated treatment well;Patient limited by fatigue   Assessment   Prognosis Good   Problem List Decreased strength; Impaired balance;Decreased mobility; Decreased endurance;Pain;Obesity;Orthopedic restrictions   Assessment Pt demonstrates decreased endurance today, reports  feeling tired, notes indicated continued CHF  Pt was able to walk in the room but declined walking in the rouse  Plan to bring a cane next time for pt use as gait is mildly unsteady/pt reaching for furniture  The patient's AM-PAC Basic Mobility Inpatient Short Form Raw Score is 17  A Raw score of greater than 16 suggests the patient may benefit from discharge to home  Plan   Treatment/Interventions ADL retraining;Functional transfer training;LE strengthening/ROM; Therapeutic exercise; Endurance training;Gait training;Bed mobility; Equipment eval/education;Patient/family training;Elevations   Progress Progressing toward goals   PT Frequency Other (Comment)  (5w)   Recommendation   PT Discharge Recommendation Home with home health rehabilitation  (24 hour assist upon DC)   Equipment Recommended   (pt has a cane)   AM-PAC Basic Mobility Inpatient   Turning in Bed Without Bedrails 3   Lying on Back to Sitting on Edge of Flat Bed 3   Moving Bed to Chair 3   Standing Up From Chair 3   Walk in Room 3   Climb 3-5 Stairs 2   Basic Mobility Inpatient Raw Score 17   Basic Mobility Standardized Score 35 55   Highest Level Of Mobility   -HL Goal 4: Move to chair/commode   JH-HLM Highest Level of Mobility 6: Walk 10 steps or more   -HL Goal Achieved Yes   End of Consult   Patient Position at End of Consult All needs within reach; Supine   End of Consult Comments L UE elevated on a pillow, sling strap loosened due to pt c/o neck pain from the strap being tight     Licensure   [place] License Number  Mohan CHRISTIANSON  [id number]

## 2022-03-14 NOTE — ASSESSMENT & PLAN NOTE
Wt Readings from Last 3 Encounters:   03/14/22 127 kg (279 lb 15 8 oz)   03/08/22 135 kg (297 lb 9 9 oz)   03/03/22 127 kg (279 lb 15 8 oz)     · SOB POA, secondary to acute on chronic diastolic CHF  BNP 5992  CT shows small b/l plerual effusions   · Recent shoulder surgery, SEBASTIAN, not on diuretics at SNF (previously on torsemide 10mg/d)  · ECHO performed 3/10 shows EF 26%, systolic function low normal, borderline concentric hypertrophy as per Cardiology report  · Procalcitonin negative - would hold off abx for aspiration related air space opacities seen on CT    · CTA chest negative for pulmonary embolism   · Patient had developed some SOB on 3/13; repeat chest xray continues to show pulmonary congestion  · Transitioned back to lasix IV and scheduled albumin IV BID  · Cardiology following, recommend continue IV diuresis for another 24 hours and reasses volume status in a m  · Patient currently requiring 2L NC O2, will need desaturation screening prior to discharge  · Continue metoprolol 50 mg q 12 hours  · Continue intake and output, daily weights

## 2022-03-14 NOTE — DISCHARGE SUMMARY
[place] 47  Discharge- Cristina Fleischer 1950, 73 y o  female MRN: [id number]  Unit/Bed#: [address] [place] [address] Encounter: [id number]  Primary Care Provider: Que Briggs   Date and time admitted to hospital: 3/9/2022  4:26 PM    No new Assessment & Plan notes have been filed under this hospital service since the last note was generated  Service: Hospitalist      Medical Problems             Resolved Problems  Date Reviewed: 3/14/2022          Resolved    Nausea & vomiting 3/10/2022     Resolved by  Flor Del Real PA-C    Chest pain 3/10/2022     Resolved by  Flor Del Real PA-C              Discharging Physician / Practitioner: Krista Jacobo PA-C  PCP: Que Briggs  Admission Date:   Admission Orders (From admission, onward)     Ordered        03/09/22 2051  INPATIENT ADMISSION  Once                      Discharge Date: 03/14/22    Consultations During Hospital Stay:  · Orthopedic surgery, Cardiology, Nephrology    Procedures Performed:   · None    Significant Findings / Test Results:   XR chest portable   Final Result by Rock Cassie MD (03/13 1304)      Pulmonary vascular congestion is similar to prior study  Workstation performed: [id number]         VAS upper limb venous duplex scan, unilateral/limited   Final Result by Rossy De La Garza MD (03/10 2133)      XR chest portable   Final Result by Kinza Camarena MD (03/10 0239)      Increased pulmonary vascular congestion and development of small bilateral pleural effusions  Workstation performed: [id number]         CTA ED chest PE Study   Final Result by Bernardo Almanza MD (03/09 2032)         1  Small bilateral pleural effusions and bibasilar atelectasis  Few airspace opacities in the right lung could represent mild aspiration  2   No evidence of acute pulmonary embolus, thoracic aortic aneurysm or dissection                    Workstation performed: [id number]         XR chest 1 view portable   Final Result by Boris Ma MD (03/10 1231)      Hypoventilation with subsegmental atelectasis at the bases  Developing edema or inflammatory infiltrates are difficult to exclude  Please see subsequent CT for further report                  Workstation performed: [id number]         · Echo 3/10: Left ventricular cavity size is normal  Wall thickness is mildly increased  The left ventricular ejection fraction is 53% by biplane measurement  Systolic function is low normal  Wall motion is normal  There is borderline concentric hypertrophy  Incidental Findings:   · None     Test Results Pending at Discharge (will require follow up): · None     Outpatient Tests Requested:  · Follow-up with orthopedic surgery outpatient  · Follow-up with Cardiology outpatient    Complications:  None    Reason for Admission:  Acute CHF    Hospital Course:   Darrell Patel is a 73 y o  female, PMH AFib on Eliquis, anemia, HTN, obesity, DM, 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, who originally presented to the hospital on 3/9/2022 due to SOB in setting of acute CHF exacerbation  Patient recently had total left shoulder replacement, concern for VTE event, CTA chest negative for PE, procalcitonin negative, low suspicion for pneumonia  BNP elevated, patient appeared volume overloaded, started on IV Lasix, cardiology consulted  Orthopedics consulted, okay to continue Eliquis  Throughout course of admission, patient's volume status initially improved, was possibly going to be discharged on 03/13, however SOB worsened and required further IV diuretics  Patient also with SEBASTIAN in setting of diuresis, nephrology consulted  Patient eventually was transition to oral diuretics ***, to follow-up with Cardiology outpatient    In addition, patient required supplemental oxygen during hospitalization, not previously on home oxygen, desaturation screening by RT revealed ***, case management on board     {Complete this smartphrase if the patient is an inpatient and being discharged earlier than 2 midnights  If this does not apply, please delete this line:33526}    Please see above list of diagnoses and related plan for additional information  Condition at Discharge: {Condition:01076}    Discharge Day Visit / Exam:   Subjective:  ***  Vitals: Blood Pressure: 139/62 (03/14/22 1524)  Pulse: 82 (03/14/22 1524)  Temperature: 97 7 °F (36 5 °C) (03/14/22 1524)  Temp Source: Axillary (03/14/22 0000)  Respirations: 18 (03/14/22 1524)  Height: 5' 2" (157 5 cm) (03/10/22 0734)  Weight - Scale: 127 kg (279 lb 15 8 oz) (03/14/22 0600)  SpO2: 94 % (03/14/22 1524)  Exam:   Physical Exam ***    Discussion with Family: {Family Communication:81855}    Discharge instructions/Information to patient and family:   See after visit summary for information provided to patient and family  Provisions for Follow-Up Care:  See after visit summary for information related to follow-up care and any pertinent home health orders  Disposition:   {Disposition on Discharge:80017}    Planned Readmission: ***     Discharge Statement:  I spent *** minutes discharging the patient  This time was spent on the day of discharge  I had direct contact with the patient on the day of discharge  Greater than 50% of the total time was spent examining patient, answering all patient questions, arranging and discussing plan of care with patient as well as directly providing post-discharge instructions  Additional time then spent on discharge activities  Discharge Medications:  See after visit summary for reconciled discharge medications provided to patient and/or family        **Please Note: This note may have been constructed using a voice recognition system**

## 2022-03-14 NOTE — PROGRESS NOTES
Progress Note - Orthopedics   Reuben Croft 73 y o  female MRN: [id number]  Unit/Bed#: [address] [place] [address]      Subjective:    73 y  o female POD #12 left rTSA for fx  No acute events  Patient states she has some pain in the shoulder, but it is manageable  No n/t  She is continuing to be treated for acute on chronic CHF       Labs:  0   Lab Value Date/Time    HCT 29 7 (L) 03/13/2022 0445    HCT 30 9 (L) 03/10/2022 0513    HCT 31 7 (L) 03/09/2022 1830    HGB 8 7 (L) 03/13/2022 0445    HGB 8 9 (L) 03/10/2022 0513    HGB 9 2 (L) 03/09/2022 1830    INR 1 34 (H) 03/10/2022 0513    WBC 8 23 03/13/2022 0445    WBC 6 19 03/10/2022 0513    WBC 6 19 03/09/2022 1830       Meds:    Current Facility-Administered Medications:     [START ON 3/14/2022] albumin human (FLEXBUMIN) 5 % injection 12 5 g, 12 5 g, Intravenous, BID (diuretic), Tiburcio Kehr, CRNP    albuterol (PROVENTIL HFA,VENTOLIN HFA) inhaler 2 puff, 2 puff, Inhalation, Q6H PRN, Maynor Telles MD    aluminum-magnesium hydroxide-simethicone (MYLANTA) oral suspension 30 mL, 30 mL, Oral, Q6H PRN, Maynor Telles MD    amLODIPine (NORVASC) tablet 5 mg, 5 mg, Oral, Daily, Maynor Telles MD, 5 mg at 03/13/22 0818    ammonium lactate (LAC-HYDRIN) 12 % lotion, , Topical, BID, Maynor Telles MD, Given at 03/13/22 1800    apixaban (ELIQUIS) tablet 5 mg, 5 mg, Oral, BID, Maynor Telles MD, 5 mg at 03/13/22 1759    ferric gluconate (FERRLECIT) injection 125 mg, 125 mg, Intravenous, Daily, Maynor Telles MD, 125 mg at 03/13/22 0820    [START ON 3/14/2022] furosemide (LASIX) injection 60 mg, 60 mg, Intravenous, BID (diuretic), Tiburcio Kehr, CRNP    insulin glargine (LANTUS) subcutaneous injection 20 Units 0 2 mL, 20 Units, Subcutaneous, HS, Maynor Telles MD, 20 Units at 03/12/22 2139    insulin lispro (HumaLOG) 100 units/mL subcutaneous injection 1-5 Units, 1-5 Units, Subcutaneous, TID AC, 1 Units at 03/13/22 2459 **AND** Fingerstick Glucose (POCT), , , TID AC, MD Singh Brown insulin lispro (HumaLOG) 100 units/mL subcutaneous injection 1-5 Units, 1-5 Units, Subcutaneous, HS, Velvet Morales MD, 2 Units at 03/12/22 2139    lidocaine (LIDODERM) 5 % patch 2 patch, 2 patch, Topical, Daily, Velvet Morales MD, 2 patch at 03/13/22 0819    loratadine (CLARITIN) tablet 10 mg, 10 mg, Oral, Daily, Velvet Morales MD, 10 mg at 03/13/22 0818    magnesium hydroxide (MILK OF MAGNESIA) oral suspension 30 mL, 30 mL, Oral, Daily PRN, Velvet Morales MD    metoprolol (LOPRESSOR) injection 5 mg, 5 mg, Intravenous, Q6H PRN, Velvet Morales MD    metoprolol tartrate (LOPRESSOR) tablet 50 mg, 50 mg, Oral, Q12H [address], Velvet Morales MD, 50 mg at 03/13/22 0818    montelukast (SINGULAIR) tablet 10 mg, 10 mg, Oral, HS, Velvet Morales MD, 10 mg at 03/12/22 2140    nitroglycerin (NITROSTAT) SL tablet 0 4 mg, 0 4 mg, Sublingual, Q5 Min PRN, Velvet Morales MD    nystatin (MYCOSTATIN) powder, , Topical, BID, Velvet Morales MD, Given at 03/13/22 1800    ondansetron (ZOFRAN) injection 4 mg, 4 mg, Intravenous, Q6H PRN, Velvet Morales MD, 4 mg at 03/11/22 0345    pravastatin (PRAVACHOL) tablet 80 mg, 80 mg, Oral, Daily With Magno Hunt MD, 80 mg at 03/13/22 1759    pregabalin (LYRICA) capsule 100 mg, 100 mg, Oral, BID, Velvet Morales MD, 100 mg at 03/13/22 1759    traMADol (ULTRAM) tablet 50 mg, 50 mg, Oral, Q6H PRN, Mikey Rm PA-C, 50 mg at 03/11/22 1346    Blood Culture:   Lab Results   Component Value Date    BLOODCX No Growth at 72 hrs  03/09/2022       Wound Culture:   No results found for: WOUNDCULT    Ins and Outs:  I/O last 24 hours: In: 240 [P O :240]  Out: 1400 [Urine:1400]          Physical:  Vitals:    03/13/22 1907   BP: 160/75   Pulse: 82   Resp: 18   Temp: (!) 97 4 °F (36 3 °C)   SpO2: 94%     Musculoskeletal: left Upper Extremity  · Skin - incision c/d/i   No erythema/fluctuance  · Sling in place  · Compartments soft  · SILT m/r/u    · Motor intact ain/pin/m/r/u  · Fingers warm and well perfused      _*_*_*_*_*_*_*_*_*_*_*_*_*_*_*_*_*_*_*_*_*_*_*_*_*_*_*_*_*_*_*_*_*_*_*_*_*_*_*_*_*    Assessment:    73 y  o female POD #12 left rTSA for fx, currently admitted for acute on chronic CHF  Plan:  · NWB LUE  · Continue with use of sling x 6 weeks total from surgery    · Will inform Dr Nicolás Brady team that pt still in the hospital to help set up appropriate post-op care for her    Marilyn Chavez PA-C

## 2022-03-14 NOTE — PROGRESS NOTES
[place] 47  Progress Note Alton Ray 1950, 73 y o  female MRN: [id number]  Unit/Bed#: [address] [place] [address] Encounter: [id number]  Primary Care Provider: Porfirio Shah   Date and time admitted to hospital: 3/9/2022  4:26 PM    * Acute diastolic congestive heart failure [facility])  Assessment & Plan  Wt Readings from Last 3 Encounters:   03/14/22 127 kg (279 lb 15 8 oz)   03/08/22 135 kg (297 lb 9 9 oz)   03/03/22 127 kg (279 lb 15 8 oz)     · SOB POA, secondary to acute on chronic diastolic CHF  BNP 5992  CT shows small b/l plerual effusions   · Recent shoulder surgery, SEBASTIAN, not on diuretics at SNF (previously on torsemide 10mg/d)  · ECHO performed 3/10 shows EF 10%, systolic function low normal, borderline concentric hypertrophy as per Cardiology report  · Procalcitonin negative - would hold off abx for aspiration related air space opacities seen on CT    · CTA chest negative for pulmonary embolism   · Patient had developed some SOB on 3/13; repeat chest xray continues to show pulmonary congestion  · Transitioned back to lasix IV and scheduled albumin IV BID  · Cardiology following, recommend continue IV diuresis for another 24 hours and reasses volume status in a m  · Patient currently requiring 2L NC O2, will need desaturation screening prior to discharge  · Continue metoprolol 50 mg q 12 hours  · Continue intake and output, daily weights       SEBASTIAN (acute kidney injury) ([facility] [address] )  Assessment & Plan  · Creatinine gradually trending up this admission, 1 89 today  · Baseline Cr 1 4-1 6 with multiple episodes of SEBASTIAN  · Likely secondary to CRS in setting of CHF exacerbation complicated with contrast induced nephropathy  · Had CT contrast 3/9  · Nephrology consulted:  · Plan for renal US  · Avoid nephrotoxins  · Caution with diuresis  · Monitor BMP    Status post reverse total replacement of left shoulder  Assessment & Plan  · S/p fall resulting in Comminuted displaced fracture of the proximal humerus (2/23/22)  · S/p left shoulder reverse complete arthroplasty (3/1/22)   · Cleared from ortho standpoint for patient to start Eliquis   · LUE venous duplex is negative for DVT  · PT / OT consults appreciated; recommending home with therapy      Diabetes mellitus, type 2 [facility])  Assessment & Plan  Lab Results   Component Value Date    HGBA1C 6 8 (H) 03/09/2022     Recent Labs     03/13/22  1113 03/13/22  1543 03/13/22 2019 03/14/22  0713   POCGLU 223* 199* 211* 158*     Blood Sugar Average: Last 72 hrs:  (P) 171 [id number]   · Lantus 20 units SQ daily   · A1c indicated good control   · Continue sliding scale coverage    Essential hypertension  Assessment & Plan  · On lopressor PO; increased to 50 mg p o  Q 12 hours during hospitalization  · Continue Norvasc    Anemia  Assessment & Plan  · Hgb stable, likely worsened secondary to post op loss     Mixed hyperlipidemia  Assessment & Plan  · On rosuvastatin  · Heart healthy diet    Paroxysmal atrial fibrillation (HCC)  Assessment & Plan  · On arrival rapid a fib with RVR, responded to IV lopressor - continue 5 mg IV prn   · Continue metoprolol tartrate 50 mg PO BID at home   · DEMETRIS Vasc score 3, Dr Hipolito Neal cleared to resume Eliquis    · Continue Eliquis 5 mg p o  B i d     Morbid obesity with BMI of 45 0-49 9, adult (HCC)  Assessment & Plan  · Counseled weight loss   · Sleep apnea, refusing CPAP         VTE Pharmacologic Prophylaxis: VTE Score: 17 High Risk (Score >/= 5) - Pharmacological DVT Prophylaxis Ordered: apixaban (Eliquis)  Sequential Compression Devices Ordered  Patient Centered Rounds: I performed bedside rounds with nursing staff today  Discussions with Specialists or Other Care Team Provider: Nephrology    Education and Discussions with Family / Patient: Attempted to update  (POA) via phone  Left voicemail  Time Spent for Care: 30 minutes   More than 50% of total time spent on counseling and coordination of care as described above  Current Length of Stay: 5 day(s)  Current Patient Status: Inpatient   Certification Statement: The patient will continue to require additional inpatient hospital stay due to SEBASTIAN, IV diuresis  Discharge Plan: Anticipate discharge in 24-48 hrs to home with home services  Code Status: Level 1 - Full Code    Subjective:   Patient is seen at bedside this am, reports improved SOB, no other acute complaints  Objective:     Vitals:   Temp (24hrs), Av 5 °F (36 4 °C), Min:97 3 °F (36 3 °C), Max:97 8 °F (36 6 °C)    Temp:  [97 3 °F (36 3 °C)-97 8 °F (36 6 °C)] 97 3 °F (36 3 °C)  HR:  [70-82] 72  Resp:  [16-19] 19  BP: (148-178)/(71-83) 148/71  SpO2:  [94 %-97 %] 96 %  Body mass index is 51 21 kg/m²  Input and Output Summary (last 24 hours): Intake/Output Summary (Last 24 hours) at 3/14/2022 1117  Last data filed at 3/14/2022 0601  Gross per 24 hour   Intake 150 ml   Output 1475 ml   Net -1325 ml       Physical Exam:   Physical Exam  Constitutional:       General: She is not in acute distress  Appearance: She is obese  She is not ill-appearing, toxic-appearing or diaphoretic  Cardiovascular:      Rate and Rhythm: Normal rate and regular rhythm  Pulses: Normal pulses  Heart sounds: Normal heart sounds  Pulmonary:      Effort: Pulmonary effort is normal  No respiratory distress  Breath sounds: Rales present  Comments: Diffuse rales across lung fields  Abdominal:      General: Bowel sounds are normal  There is no distension  Palpations: Abdomen is soft  Tenderness: There is no abdominal tenderness  Musculoskeletal:         General: No swelling or tenderness  Skin:     General: Skin is warm  Neurological:      General: No focal deficit present  Mental Status: She is alert and oriented to person, place, and time     Psychiatric:         Mood and Affect: Mood normal          Behavior: Behavior normal          Additional Data:     Labs:  Results from last 7 days   Lab Units 03/14/22  0515 03/13/22  0445 03/10/22  0513   WBC Thousand/uL 7 82   < > 6 19   HEMOGLOBIN g/dL 8 8*   < > 8 9*   HEMATOCRIT % 29 6*   < > 30 9*   PLATELETS Thousands/uL 223   < > 300   NEUTROS PCT %  --   --  54   LYMPHS PCT %  --   --  25   MONOS PCT %  --   --  15*   EOS PCT %  --   --  4    < > = values in this interval not displayed  Results from last 7 days   Lab Units 03/14/22  0515   SODIUM mmol/L 143   POTASSIUM mmol/L 4 5   CHLORIDE mmol/L 105   CO2 mmol/L 38*   BUN mg/dL 34*   CREATININE mg/dL 1 89*   ANION GAP mmol/L 0*   CALCIUM mg/dL 7 6*   ALBUMIN g/dL 2 6*   TOTAL BILIRUBIN mg/dL 0 29   ALK PHOS U/L 87   ALT U/L 20   AST U/L 16   GLUCOSE RANDOM mg/dL 159*     Results from last 7 days   Lab Units 03/10/22  0513   INR  1 34*     Results from last 7 days   Lab Units 03/14/22  0713 03/13/22  2019 03/13/22  1543 03/13/22  1113 03/13/22  0614 03/12/22  2016 03/12/22  1658 03/12/22  1057 03/12/22  0708 03/11/22  1955 03/11/22  1618 03/11/22  1128   POC GLUCOSE mg/dl 158* 211* 199* 223* 167* 249* 191* 112 63* 202* 157* 193*     Results from last 7 days   Lab Units 03/09/22  1830   HEMOGLOBIN A1C % 6 8*     Results from last 7 days   Lab Units 03/10/22  0513 03/09/22  2106   PROCALCITONIN ng/ml 0 08 0 08       Lines/Drains:  Invasive Devices  Report    Peripheral Intravenous Line            Peripheral IV 03/13/22 Right Antecubital <1 day          Drain            External Urinary Catheter <1 day                      Imaging: Reviewed radiology reports from this admission including: chest xray, chest CT scan and ultrasound(s)    Recent Cultures (last 7 days):   Results from last 7 days   Lab Units 03/10/22  0349 03/09/22  2126 03/09/22  2106   BLOOD CULTURE   --  No Growth After 4 Days  No Growth After 4 Days     URINE CULTURE  <10,000 cfu/ml   --   --        Last 24 Hours Medication List:   Current Facility-Administered Medications   Medication Dose Route Frequency Provider Last Rate    albumin human  12 5 g Intravenous BID (diuretic) RK Horne      albuterol  2 puff Inhalation Q6H PRN Meryle Laws, MD      aluminum-magnesium hydroxide-simethicone  30 mL Oral Q6H PRN Meryle Laws, MD      amLODIPine  5 mg Oral Daily Meryle Laws, MD      ammonium lactate   Topical BID Meryle Laws, MD      apixaban  5 mg Oral BID Meryle Laws, MD      furosemide  60 mg Intravenous BID (diuretic) RK Horne      insulin glargine  20 Units Subcutaneous HS Meryle Laws, MD      insulin lispro  1-5 Units Subcutaneous TID [facility] Meryle Laws, MD      insulin lispro  1-5 Units Subcutaneous HS Meryle Laws, MD      lidocaine  2 patch Topical Daily Meryle Laws, MD      loratadine  10 mg Oral Daily Meryle Laws, MD      magnesium hydroxide  30 mL Oral Daily PRN Meryle Laws, MD      metoprolol  5 mg Intravenous Q6H PRN Meryle Laws, MD      metoprolol tartrate  50 mg Oral Q12H [address] Meryle Laws, MD      montelukast  10 mg Oral HS Meryle Laws, MD      nitroglycerin  0 4 mg Sublingual Q5 Min PRN Meryle Laws, MD      nystatin   Topical BID Meryle Laws, MD      ondansetron  4 mg Intravenous Q6H PRN Meryle Laws, MD      pravastatin  80 mg Oral Daily With Babatunde White MD      pregabalin  100 mg Oral BID Meryle Laws, MD      traMADol  50 mg Oral Q6H PRN Reed Rm PA-C          Today, Patient Was Seen By: Chuy Benedict PA-C    **Please Note: This note may have been constructed using a voice recognition system  **

## 2022-03-14 NOTE — ASSESSMENT & PLAN NOTE
· S/p fall resulting in Comminuted displaced fracture of the proximal humerus (2/23/22)    · S/p left shoulder reverse complete arthroplasty (3/1/22)   · Cleared from ortho standpoint for patient to start Eliquis   · MIKALAE venous duplex is negative for DVT  · PT / OT consults appreciated; recommending home with therapy

## 2022-03-14 NOTE — PROGRESS NOTES
Progress Note - Cardiology   [facility]     Stacy Salvador 73 y o  female MRN: [id number]  : 1950  Unit/Bed#: [address] [place] [address] Encounter: [id number]    Assessment and Plan:   1  Acute on chronic diastolic heart failure:  Noted patient was not discharged on her home dose of torsemide    -  patient diuretics as related to Lasix 60 mg b i d  With albumin  Continues to diurese well  -  continue for another 24 hours and re-evaluate    -  low-sodium diet    -  I&O, daily weights and monitor labs     2  Superficial thrombophlebitis of left cephalic vein:  Eliquis resumed     3  Paroxysmal atrial fibrillation:  Currently sinus rhythm    -  Eliquis 5 mg b i d  Was resumed during this admission    -  continue Lopressor 50 mg q 12 hours      4  Hypertension:  Blood pressure stable on Lopressor 50 mg b i d  And Norvasc 5 mg daily    -  continue to monitor     5  Postop Anemia:  Hemoglobin stable     6  Left total shoulder replacement:  2022     Subjective / Objective:   Patient seen and examined  She states that a good weight for her is around 269 lb  Her diuretics were escalated over weekend with good response  Will continue for another 24 hours and re-evaluate    Vitals: Blood pressure 148/71, pulse 72, temperature (!) 97 3 °F (36 3 °C), resp  rate 19, height 5' 2" (1 575 m), weight 127 kg (279 lb 15 8 oz), SpO2 96 %, not currently breastfeeding  Vitals:    22 1737 22 0600   Weight: 129 kg (284 lb 2 8 oz) 127 kg (279 lb 15 8 oz)     Body mass index is 51 21 kg/m²  BP Readings from Last 3 Encounters:   22 148/71   22 144/66   22 164/69     Orthostatic Blood Pressures      Most Recent Value   Blood Pressure 148/71 filed at 2022 0735   Patient Position - Orthostatic VS Lying filed at 2022 0000        I/O        0701   07 07 07 0701  03/15 0700    P  O  840 150     I V  (mL/kg)       Total Intake(mL/kg) 840 (6 4) 150 (1 2)     Urine (mL/kg/hr) 1800 (0 6) 1475 (0 5)     Total Output 1800 1475     Net -960 -1325            Unmeasured Urine Occurrence  1 x         Invasive Devices  Report    Peripheral Intravenous Line            Peripheral IV 03/13/22 Right Antecubital <1 day          Drain            External Urinary Catheter <1 day                  Intake/Output Summary (Last 24 hours) at 3/14/2022 0931  Last data filed at 3/14/2022 0601  Gross per 24 hour   Intake 150 ml   Output 1475 ml   Net -1325 ml         Physical Exam:   Physical Exam  Vitals and nursing note reviewed  Constitutional:       Appearance: Normal appearance  She is obese  HENT:      Right Ear: External ear normal       Left Ear: External ear normal       Nose: Nose normal    Eyes:      General: No scleral icterus  Right eye: No discharge  Left eye: No discharge  Cardiovascular:      Rate and Rhythm: Normal rate and regular rhythm  Pulses: Normal pulses  Heart sounds: No murmur heard  Pulmonary:      Effort: Pulmonary effort is normal  No respiratory distress  Breath sounds: Normal breath sounds  Abdominal:      General: Bowel sounds are normal  There is no distension  Palpations: Abdomen is soft  Musculoskeletal:      Right lower leg: Edema present  Left lower leg: Edema present  Skin:     General: Skin is warm and dry  Capillary Refill: Capillary refill takes less than 2 seconds  Neurological:      General: No focal deficit present  Mental Status: She is alert and oriented to person, place, and time  Mental status is at baseline     Psychiatric:         Mood and Affect: Mood normal                    Medications/ Allergies:     Current Facility-Administered Medications   Medication Dose Route Frequency Provider Last Rate    albumin human  12 5 g Intravenous BID (diuretic) RK Vega      albuterol  2 puff Inhalation Q6H PRN Natalia Mcintosh MD      aluminum-magnesium hydroxide-simethicone  30 mL Oral Q6H PRN Samantha Ochoa MD      amLODIPine  5 mg Oral Daily Samantha Ochoa MD      ammonium lactate   Topical BID Samantha Ochoa MD      apixaban  5 mg Oral BID Samantha Ochoa MD      furosemide  60 mg Intravenous BID (diuretic) RK Gonzalez      insulin glargine  20 Units Subcutaneous HS Samantha Ochoa MD      insulin lispro  1-5 Units Subcutaneous TID [facility] Samantha Ochoa MD      insulin lispro  1-5 Units Subcutaneous HS Samantha Ochoa MD      lidocaine  2 patch Topical Daily Samantha Ochoa MD      loratadine  10 mg Oral Daily Samantha Ochoa MD      magnesium hydroxide  30 mL Oral Daily PRN Samantha Ochoa MD      metoprolol  5 mg Intravenous Q6H PRN Samantha Ochoa MD      metoprolol tartrate  50 mg Oral Q12H [address] Samantha Ochoa MD      montelukast  10 mg Oral HS Samantha Ochoa MD      nitroglycerin  0 4 mg Sublingual Q5 Min PRN Samantha Ochoa MD      nystatin   Topical BID Samantha Ochoa MD      ondansetron  4 mg Intravenous Q6H PRN Samantha Ochoa MD      pravastatin  80 mg Oral Daily With Chante Hill MD      pregabalin  100 mg Oral BID Samantha Ochoa MD      traMADol  50 mg Oral Q6H PRN Mikey Rm PA-C       albuterol, 2 puff, Q6H PRN  aluminum-magnesium hydroxide-simethicone, 30 mL, Q6H PRN  magnesium hydroxide, 30 mL, Daily PRN  metoprolol, 5 mg, Q6H PRN  nitroglycerin, 0 4 mg, Q5 Min PRN  ondansetron, 4 mg, Q6H PRN  traMADol, 50 mg, Q6H PRN      Allergies   Allergen Reactions    Penicillins Hives    Moxifloxacin Other (See Comments)     unknown    Percocet [Oxycodone-Acetaminophen] Other (See Comments)     unknown    Zinc Acetate Other (See Comments)     unknown    Nuts - Food Allergy Other (See Comments)    Asa [Aspirin] GI Intolerance    Indocin [Indomethacin] Other (See Comments)     Made patient "loopy"    Other Other (See Comments)     unknown       VTE Pharmacologic Prophylaxis:   Sequential compression device (Venodyne)     Labs: Troponins:  Results from last 7 days   Lab Units 03/09/22  2228 03/09/22  2040   HSTNI D2 ng/L  --  0   HSTNI D4 ng/L 0  --      CBC with diff:  Results from last 7 days   Lab Units 03/14/22  0515 03/13/22  0445 03/10/22  0513 03/09/22  1830 03/08/22  1736   WBC Thousand/uL 7 82 8 23 6 19 6 19 6 02   HEMOGLOBIN g/dL 8 8* 8 7* 8 9* 9 2* 8 8*   HEMATOCRIT % 29 6* 29 7* 30 9* 31 7* 29 9*   MCV fL 98 97 99* 96 96   PLATELETS Thousands/uL 223 253 300 356 331   MCH pg 29 1 28 4 28 4 28 0 28 3   MCHC g/dL 29 7* 29 3* 28 8* 29 0* 29 4*   RDW % 15 5* 15 5* 15 7* 15 5* 15 8*   MPV fL 10 2 10 1 9 9 10 0 9 8   NRBC AUTO /100 WBCs  --   --  0 0 0     CMP:  Results from last 7 days   Lab Units 03/14/22  0515 03/13/22 0445 03/12/22  0426 03/10/22  0513 03/09/22  1830 03/08/22  1736   SODIUM mmol/L 143 143 144 146* 146* 145   POTASSIUM mmol/L 4 5 4 5 4 3 4 2 4 6 4 6   CHLORIDE mmol/L 105 106 106 107 108 107   CO2 mmol/L 38* 33* 34* 32 31 27   ANION GAP mmol/L 0* 4 4 7 7 11   BUN mg/dL 34* 33* 32* 30* 32* 37*   CREATININE mg/dL 1 89* 1 85* 1 78* 1 24 1 14 1 24   CALCIUM mg/dL 7 6* 7 6* 7 6* 8 2* 8 4 8 2*   AST U/L 16  --   --  14 13 22   ALT U/L 20  --   --  18 17 18   ALK PHOS U/L 87  --   --  79 84 83   TOTAL PROTEIN g/dL 5 9*  --   --  6 3* 6 2* 6 2*   ALBUMIN g/dL 2 6*  --   --  2 5* 2 4* 2 3*   TOTAL BILIRUBIN mg/dL 0 29  --   --  0 32 0 41 0 52   EGFR ml/min/1 73sq m 26 27 28 44 49 44     Magnesium:  Results from last 7 days   Lab Units 03/10/22  0513 03/09/22  1830 03/08/22  1736   MAGNESIUM mg/dL 2 1 2 3 2 5     Coags:  Results from last 7 days   Lab Units 03/10/22  0513 03/09/22  1830 03/08/22  1736   PTT seconds 40* 37 41*   INR  1 34* 1 22* 1 36*     TSH:  Results from last 7 days   Lab Units 03/09/22  1830   TSH 3RD GENERATON uIU/mL 3 550     No components found for: TSH3  Lipid Profile:  Results from last 7 days   Lab Units 03/10/22  0513   CHOLESTEROL mg/dL 104   TRIGLYCERIDES mg/dL 132   HDL mg/dL 30*   LDL CALC mg/dL 48     Hgb A1c:  Results from last 7 days   Lab Units 03/09/22  1830   HEMOGLOBIN A1C % 6 8*       Imaging & Testing   I have personally reviewed pertinent reports  XR chest portable    Result Date: 3/13/2022  Narrative: CHEST INDICATION:   sob  COMPARISON:  Chest radiograph March 10, 2022  Correlation with chest CT March 9, 2022 EXAM PERFORMED/VIEWS:  XR CHEST PORTABLE FINDINGS: Cardiomediastinal silhouette appears unremarkable  Persistent mild prominence of the interstitial markings  Limited assessment for pleural effusions; no large pleural effusion  No pneumothorax  Partially imaged left shoulder reverse arthroplasty  Impression: Pulmonary vascular congestion is similar to prior study  Workstation performed: [id number]     XR chest portable    Result Date: 3/10/2022  Narrative: CHEST INDICATION:   sob  fu on status of fluid overload  COMPARISON:  3/9/2022  EXAM PERFORMED/VIEWS:  XR CHEST PORTABLE Images:  1 FINDINGS: Cardiac and mediastinal contours are stable and within normal limits  The aorta is calcified  There is increased pulmonary vascular congestion and development of small bilateral pleural effusions  No pneumothorax or focal airspace consolidation  Left shoulder reverse total arthroplasty partially seen  Impression: Increased pulmonary vascular congestion and development of small bilateral pleural effusions  Workstation performed: [id number]     XR chest 1 view portable    Result Date: 3/10/2022  Narrative: CHEST INDICATION:   shortness of breath  COMPARISON:  3/8/2022 ; 7/31/2019 EXAM PERFORMED/VIEWS:  XR CHEST PORTABLE FINDINGS:  Mild hypoventilation is present  Cardiomediastinal silhouette appears unremarkable  Subsegmental atelectasis at the bases is noted probably related to hypoventilation although some developing edema is difficult to exclude  No pneumothorax or pleural effusion  Left shoulder replacement is present       Impression: Hypoventilation with subsegmental atelectasis at the bases  Developing edema or inflammatory infiltrates are difficult to exclude  Please see subsequent CT for further report Workstation performed: [id number]     XR chest 1 view portable    Result Date: 3/9/2022  Narrative: CHEST INDICATION:   chest pain  COMPARISON:  7/31/2019 EXAM PERFORMED/VIEWS:  XR CHEST PORTABLE Single view FINDINGS: Cardiomediastinal silhouette appears unremarkable  The lungs are clear  No pneumothorax or pleural effusion  Reverse left shoulder arthroplasty has been performed since the prior study     Impression: No acute cardiopulmonary disease  Findings are stable except for left shoulder arthroplasty Workstation performed: [id number]       XR shoulder left 1 view    Result Date: 3/3/2022  Narrative: LEFT SHOULDER INDICATION:   post op  COMPARISON:  2/23/2022 VIEWS:  XR SHOULDER 1 VW LEFT FINDINGS: There is a reverse left shoulder arthroplasty  Components are in anatomic alignment  There are expected postoperative changes  Impression: Anatomic alignment of left shoulder reverse arthroplasty  Workstation performed: [id number]       VAS upper limb venous duplex scan, unilateral/limited    Result Date: 3/10/2022  Narrative:  [facility] REPORT CLINICAL: Indications:  Patient presents with left upper extremity edema x  several days  S/P left shoulder surgery 3/1/2022  Operative History: Patient denies any cardiovascular surgeries  Risk Factors The patient has history of HTN, Diabetes (Yes), Hyperlipidemia, CKD and previous smoking (quit >10yrs ago)  FINDINGS:  Left          Thrombus  Ceph Mid Arm  Acute        CONCLUSION: Impression  RIGHT UPPER LIMB LIMITED: Evaluation shows no evidence of thrombus in the internal jugular vein, subclavian vein, and the brachiocephalic vein  LEFT UPPER LIMB: Evidence of superficial thrombophlebitis noted in the cephalic vein  No evidence of acute or chronic deep vein thrombosis   Doppler evaluation shows a normal response to augmentation maneuvers  Technically difficult/limited study due to immobile shoulder, pitting edema and poor visualization of deep system  SIGNATURE: Electronically Signed by: Cata Lindsey MD on 2022-03-10 06:23:16 PM    CTA ED chest PE Study    Result Date: 3/9/2022  Narrative: CTA - CHEST WITH IV CONTRAST - PULMONARY ANGIOGRAM INDICATION:   Shortness of breath  COMPARISON: 3/9/2022  TECHNIQUE: CTA examination of the chest was performed using angiographic technique according to a protocol specifically tailored to evaluate for pulmonary embolism  Axial, sagittal, and coronal 2D reformatted images were created from the source data and  submitted for interpretation  In addition, coronal 3D MIP postprocessing was performed on the acquisition scanner  Radiation dose length product (DLP) for this visit:  684 94 mGy-cm   This examination, like all CT scans performed in the [facility], was performed utilizing techniques to minimize radiation dose exposure, including the use of iterative  reconstruction and automated exposure control  IV Contrast:  85 mL of iohexol (OMNIPAQUE)  FINDINGS: PULMONARY ARTERIAL TREE:  No filling defect in the visualized pulmonary arteries to suggest acute embolus  LUNGS:  Bibasilar atelectasis  Few small airspace opacities scattered throughout the right lung could represent mild aspiration  No pulmonary interstitial edema  There is no tracheal or endobronchial lesion  PLEURA:  Small bilateral pleural effusions  HEART/GREAT VESSELS: Mild cardiomegaly  Aberrant right subclavian artery  No thoracic aortic aneurysm or dissection  MEDIASTINUM AND LUIGI:  No lymphadenopathy  CHEST WALL AND LOWER NECK:   Unremarkable  VISUALIZED STRUCTURES IN THE UPPER ABDOMEN:  Unremarkable  OSSEOUS STRUCTURES:  No acute fracture or destructive osseous lesion  Impression: 1  Small bilateral pleural effusions and bibasilar atelectasis    Few airspace opacities in the right lung could represent mild aspiration  2   No evidence of acute pulmonary embolus, thoracic aortic aneurysm or dissection  Workstation performed: [id number]     [address] [facility] bedside procedure    Result Date: 3/1/2022  Narrative: 1 2 [id number] [id number]  3 [id number]  7 [id number]    Echo complete w/ contrast if indicated    Result Date: 3/11/2022  Narrative: Daniel Venegas  Left Ventricle: Left ventricular cavity size is normal  Wall thickness is mildly increased  The left ventricular ejection fraction is 53% by biplane measurement  Systolic function is low normal  Wall motion is normal  There is borderline concentric hypertrophy    Right Ventricle: Systolic function is low normal  Normal tricuspid annular plane systolic excursion (TAPSE) > 1 7 cm    Study Details: This transthoracic echocardiogram was performed at bedside  This was a routine, inpatient study  Study quality was poor  This was a technically difficult study due to decreased penetration, restricted mobility and poor acoustic windows  A complete 2D, color flow Doppler, spectral Doppler, 2D, color flow Doppler and spectral Doppler transthoracic echocardiogram was performed  The apical, parasternal, subcostal and suprasternal views were obtained  0 6 ml/min of Definity ultrasound enhancing agent was used due to opacify the left ventricle  Kinza March  Prior TTE study available for comparison  Prior study date: 5/29/2019  No significant changes noted compared to the prior study  Dee Hall   Cardiology      "This note has been constructed using a voice recognition system  Therefore there may be syntax, spelling, and/or grammatical errors   Please call if you have any questions  "

## 2022-03-14 NOTE — CONSULTS
Consultation - Nephrology   Chau Stoddard 73 y o  female MRN: [id number]  Unit/Bed#: [address] [place] [address] Encounter: [id number]    ASSESSMENT AND PLAN:   73 y o  woman PMH of CKD G3b (creatinine were 1 4-1 6mg/dL) last time seen by Danice Boast in 2019, with multiple episodes of SEBASTIAN, DM, obesity, HTN, CHF, recent fall s/p left shoulder fracture, AFib on Eliquis p/w chest pain, elevated creatinine and fluid overload  Nephrology is consulted for SEBASTIAN in diuretic management      PLAN    #Non-Oliguric KDIGO SEBASTIAN stage 1 on CKD G3bA2      Etiology:  Likely secondary to CRS settings of CHF exacerbation complicated with contrast induced nephropathy   Had CT with contrast on 3/9   Baseline creatinine:  1 4-1 6 mg/dL with multiple episodes of SEBASTIAN   Current creatinine:  1 8 mg/dL above baseline   Peak creatinine:  Trending   UA:  Leukocyturia, no micro hematuria   Renal imaging : Will plan for ultrasound of worsening kidney function   Treatment:   No urgent indication of dialysis at this time   Maintain MAP:  Over 65 mmHg if possible/avoid hypoperfusion:  Hold parameters on blood pressure medications   Avoid nephrotoxic agents such as NSAIDs, and IV contrast if possible  Avoid opioids    Adjust medications to GFR    #CKD G3bA2   Baseline creatinine:  1 4-1 6 mg/dL   Etiology:  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      #Acid-base Disorder   serum HCO3 38 millimoles per L   Metabolic alkalosis likely secondary to vascular contraction in the settings of diuretics    #Volume status/hypertension:   Volume:  Fluid overload   Blood pressure:  Hypertensive /71, goal< 140/90mmhg   Recommend:   Low-sodium diet   On diuretics:  Losing weight and good urinary output   Agree with Lasix 60 mg b i d     Will accept higher levels of creatinine to achieve more appropriate fluid status   Monitor urinary output, daily weight      #Anemia:   Current hemoglobin:  8 8 mg/dL   Treatment:   Transfuse for hemoglobin less than 7 0 per primary service      # CHF exacerbation  · Diuretics as above  · Low-sodium and restriction    HISTORY OF PRESENT ILLNESS:  Requesting Physician: Lexus Ford MD  Reason for Consult: SEBASTIAN    Cristina Fleischer is a 73 y o  female PMH of CKD G3b (creatinine were 1 4-1 6mg/dL) last time seen by Chele Padilla in 2019, with multiple episodes of SEBASTIAN, DM, obesity, hypertension, dyslipidemia, CHF, recent fall s/p left shoulder fracture, AFib on Eliquis p/w chest pain, was found to be fluid overload with elevated creatinine    A renal consultation is requested today for assistance in the management of fluid overload and SEBASTIAN    PAST MEDICAL HISTORY:  Past Medical History:   Diagnosis Date    A-fib ([place] [address] )     Anemia     Asthma     Chronic kidney disease     CKD (chronic kidney disease)     Diabetes mellitus ([facility] )     GERD (gastroesophageal reflux disease)     Hyperlipidemia     Hypertension     Kidney stones     PONV (postoperative nausea and vomiting)     SVT (supraventricular tachycardia) ([facility])        PAST SURGICAL HISTORY:  Past Surgical History:   Procedure Laterality Date    APPENDECTOMY      CYSTOSCOPY W/ LASER LITHOTRIPSY Left 7/12/2016    Procedure: CYSTOSCOPY URETEROSCOPY WITH LITHOTRIPSY HOLMIUM LASER, RETROGRADE PYELOGRAM AND INSERTION STENT URETERAL;  Surgeon: Alize Brumfield MD;  Location: [address] [place] [address];  Service:     DILATION AND CURETTAGE OF UTERUS      JOINT REPLACEMENT      right knee    KNEE ARTHROPLASTY Right     RI CYSTOURETHROSCOPY,URETER CATHETER Left 6/29/2016    Procedure: CYSTOSCOPY RETROGRADE PYELOGRAM WITH INSERTION STENT URETERAL, left;  Surgeon: Alize Brumfield MD;  Location: [address] [place] [address];  Service: Urology    RI RECONSTR TOTAL SHOULDER IMPLANT Left 3/1/2022    Procedure: ARTHROPLASTY SHOULDER REVERSE;  Surgeon: Carlos Alberto Steve MD;  Location: [facility];  Service: Orthopedics    SHOULDER ARTHROTOMY Left ALLERGIES:  Allergies   Allergen Reactions    Penicillins Hives    Moxifloxacin Other (See Comments)     unknown    Percocet [Oxycodone-Acetaminophen] Other (See Comments)     unknown    Zinc Acetate Other (See Comments)     unknown    Nuts - Food Allergy Other (See Comments)    Asa [Aspirin] GI Intolerance    Indocin [Indomethacin] Other (See Comments)     Made patient "loopy"    Other Other (See Comments)     unknown       SOCIAL HISTORY:  Social History     Substance and Sexual Activity   Alcohol Use Not Currently    Comment: rarely     Social History     Substance and Sexual Activity   Drug Use No     Social History     Tobacco Use   Smoking Status Former Smoker    Packs/day:  00    Years: 20     Pack years: 20     Types: Cigarettes    Quit date: 1996    Years since quittin 6   Smokeless Tobacco Never Used       FAMILY HISTORY:  Family History   Problem Relation Age of Onset    Heart disease Mother     Emphysema Maternal Grandmother     Heart disease Family        MEDICATIONS:    Current Facility-Administered Medications:     albumin human (FLEXBUMIN) 5 % injection 12 5 g, 12 5 g, Intravenous, BID (diuretic), Opal Raddle, CRNP, 12 5 g at 22 0807    albuterol (PROVENTIL HFA,VENTOLIN HFA) inhaler 2 puff, 2 puff, Inhalation, Q6H PRN, Izabel Paredes MD    aluminum-magnesium hydroxide-simethicone (MYLANTA) oral suspension 30 mL, 30 mL, Oral, Q6H PRN, Izabel Paredes MD    amLODIPine (NORVASC) tablet 5 mg, 5 mg, Oral, Daily, Izabel Paredes MD, 5 mg at 22 0809    ammonium lactate (LAC-HYDRIN) 12 % lotion, , Topical, BID, Izabel Paredes MD, Given at 22 0807    apixaban (ELIQUIS) tablet 5 mg, 5 mg, Oral, BID, Izabel Paredes MD, 5 mg at 22 0809    furosemide (LASIX) injection 60 mg, 60 mg, Intravenous, BID (diuretic), Birgitl MARLENE KamaraNP, 60 mg at 22 0808    insulin glargine (LANTUS) subcutaneous injection 20 Units 0 2 mL, 20 Units, Subcutaneous, HS, Lupe Spencer MD, 20 Units at 03/13/22 2107    insulin lispro (HumaLOG) 100 units/mL subcutaneous injection 1-5 Units, 1-5 Units, Subcutaneous, TID AC, 1 Units at 03/14/22 0807 **AND** Fingerstick Glucose (POCT), , , TID AC, Lupe Spencer MD    insulin lispro (HumaLOG) 100 units/mL subcutaneous injection 1-5 Units, 1-5 Units, Subcutaneous, HS, Lupe Spencer MD, 1 Units at 03/13/22 2109    lidocaine (LIDODERM) 5 % patch 2 patch, 2 patch, Topical, Daily, Lupe Spencer MD, 2 patch at 03/14/22 0808    loratadine (CLARITIN) tablet 10 mg, 10 mg, Oral, Daily, Lupe Spencer MD, 10 mg at 03/14/22 0809    magnesium hydroxide (MILK OF MAGNESIA) oral suspension 30 mL, 30 mL, Oral, Daily PRN, Lupe Spencer MD    metoprolol (LOPRESSOR) injection 5 mg, 5 mg, Intravenous, Q6H PRN, Lupe Spencer MD    metoprolol tartrate (LOPRESSOR) tablet 50 mg, 50 mg, Oral, Q12H [address], Lupe Spencer MD, 50 mg at 03/14/22 0809    montelukast (SINGULAIR) tablet 10 mg, 10 mg, Oral, HS, Lupe Spencer MD, 10 mg at 03/13/22 2106    nitroglycerin (NITROSTAT) SL tablet 0 4 mg, 0 4 mg, Sublingual, Q5 Min PRN, Lupe Spencer MD    nystatin (MYCOSTATIN) powder, , Topical, BID, Lupe Spencer MD, Given at 03/14/22 0807    ondansetron (ZOFRAN) injection 4 mg, 4 mg, Intravenous, Q6H PRN, Lupe Spencer MD, 4 mg at 03/14/22 0013    pravastatin (PRAVACHOL) tablet 80 mg, 80 mg, Oral, Daily With Shameka Sanchez MD, 80 mg at 03/13/22 1759    pregabalin (LYRICA) capsule 100 mg, 100 mg, Oral, BID, Lupe Spencer MD, 100 mg at 03/14/22 0809    traMADol (ULTRAM) tablet 50 mg, 50 mg, Oral, Q6H PRN, Mikey Rm PA-C, 50 mg at 03/14/22 0013    REVIEW OF SYSTEMS:  Complete 10 point review of systems were obtained and discussed in length with the patient  Complete review of systems were negative / unremarkable except mentioned in the HPI section       Review of Systems - Psychological ROS: negative  Ophthalmic ROS: negative  ENT ROS: negative  Hematological and Lymphatic ROS: negative  Endocrine ROS: negative  Respiratory ROS: positive for - shortness of breath  Cardiovascular ROS: no chest pain or dyspnea on exertion  Gastrointestinal ROS: no abdominal pain, change in bowel habits, or black or bloody stools  Genito-Urinary ROS: no dysuria, trouble voiding, or hematuria  Musculoskeletal ROS: negative  Neurological ROS: no TIA or stroke symptoms  Dermatological ROS: negative     PHYSICAL EXAM:  Current Weight: Weight - Scale: 127 kg (279 lb 15 8 oz)  First Weight: Weight - Scale: 120 kg (265 lb)  Vitals:    03/14/22 0735   BP: 148/71   Pulse: 72   Resp:    Temp: (!) 97 3 °F (36 3 °C)   SpO2: 96%       Intake/Output Summary (Last 24 hours) at 3/14/2022 1102  Last data filed at 3/14/2022 0601  Gross per 24 hour   Intake 150 ml   Output 1475 ml   Net -1325 ml     Wt Readings from Last 3 Encounters:   03/14/22 127 kg (279 lb 15 8 oz)   03/08/22 135 kg (297 lb 9 9 oz)   03/03/22 127 kg (279 lb 15 8 oz)     Temp Readings from Last 3 Encounters:   03/14/22 (!) 97 3 °F (36 3 °C)   03/09/22 97 9 °F (36 6 °C) (Tympanic)   03/07/22 (!) 97 4 °F (36 3 °C) (Oral)     BP Readings from Last 3 Encounters:   03/14/22 148/71   03/09/22 144/66   03/07/22 164/69     Pulse Readings from Last 3 Encounters:   03/14/22 72   03/09/22 89   03/07/22 76        General:  Obese, no acute distress at this time  Skin:  No acute rash  Eyes:  No scleral icterus and noninjected  ENT:   mucous membranes moist  Neck:   no carotid bruits,   Chest:  Breath sounds decreased in both bases, good respiratory effort, no use of accessory respiratory muscles  CVS:  Regular rate and rhythm without a murmur rub, unable to assess JVD due to body habitus  Abdomen:  Obese, soft and nontender   Extremities:  Lower extremity edema 1+  Neuro:  No gross focality  Psych:  Alert , cooperative  External cath:   With 600 cc of clear urine      Invasive Devices:      Lab Results:   Results from last 7 days Lab Units 03/14/22  0515 03/13/22  0445 03/12/22  0426 03/10/22  0513 03/09/22  1830 03/08/22  1736   WBC Thousand/uL 7 82 8 23  --  6 19 6 19 6 02   HEMOGLOBIN g/dL 8 8* 8 7*  --  8 9* 9 2* 8 8*   HEMATOCRIT % 29 6* 29 7*  --  30 9* 31 7* 29 9*   PLATELETS Thousands/uL 223 253  --  300 356 331   POTASSIUM mmol/L 4 5 4 5 4 3 4 2 4 6 4 6   CHLORIDE mmol/L 105 106 106 107 108 107   CO2 mmol/L 38* 33* 34* 32 31 27   BUN mg/dL 34* 33* 32* 30* 32* 37*   CREATININE mg/dL 1 89* 1 85* 1 78* 1 24 1 14 1 24   CALCIUM mg/dL 7 6* 7 6* 7 6* 8 2* 8 4 8 2*   MAGNESIUM mg/dL  --   --   --  2 1 2 3 2 5   PHOSPHORUS mg/dL  --   --   --  3 8  --   --        Other Studies:   XR chest portable   Final Result by Washington Molina MD (03/13 1304)      Pulmonary vascular congestion is similar to prior study  Workstation performed: [id number]         VAS upper limb venous duplex scan, unilateral/limited   Final Result by Irlanda Patel MD (03/10 3523)      XR chest portable   Final Result by Collette Daigle MD (03/10 2960)      Increased pulmonary vascular congestion and development of small bilateral pleural effusions  Workstation performed: [id number]         CTA ED chest PE Study   Final Result by Hazel Moreno MD (03/09 2032)         1  Small bilateral pleural effusions and bibasilar atelectasis  Few airspace opacities in the right lung could represent mild aspiration  2   No evidence of acute pulmonary embolus, thoracic aortic aneurysm or dissection  Workstation performed: [id number]         XR chest 1 view portable   Final Result by Junior Flores MD (03/10 9397)      Hypoventilation with subsegmental atelectasis at the bases  Developing edema or inflammatory infiltrates are difficult to exclude        Please see subsequent CT for further report                  Workstation performed: [id number]             Portions of the record may have been created with voice recognition software  Occasional wrong word or "sound a like" substitutions may have occurred due to the inherent limitations of voice recognition software  Read the chart carefully and recognize, using context, where substitutions have occurred

## 2022-03-14 NOTE — OCCUPATIONAL THERAPY NOTE
Occupational Therapy Treatment Note       03/14/22 1030   Note Type   Note Type Treatment   Restrictions/Precautions   LUE Weight Bearing Per Order NWB   Braces or Orthoses Sling  (Abduction pillow removed by Dr Rajinder Flynn)   Other Precautions O2;Fall Risk;Pain  (No AROM L shoulder)   Pain Assessment   Pain Assessment Tool 0-10   Pain Score 4   Pain Location/Orientation Orientation: Left; Location: Shoulder   ADL   UB Dressing Assistance 2  Maximal Assistance   UB Dressing Comments Review of education regarding upper body dressing and sling management; pt declined practice with dressing today, reports niece feels confident with all education provided during previous OT session and patient previously issued shoulder surgery handout for reference   Bed Mobility   Supine to Sit 4  Minimal assistance   Additional items Assist x 1; Increased time required   Transfers   Sit to Stand 4  Minimal assistance   Additional items Assist x 1   Stand to Sit 4  Minimal assistance   Additional items Assist x 1   Functional Mobility   Functional Mobility 4  Minimal assistance   Additional Comments Patient ambulated short household distance in room with hand hold assist; mildly unsteady, pt reports increased fatigue today   Therapeutic Exercise - ROM   UE-ROM Yes   ROM - Left Upper Extremities    L Shoulder AAROM; Flexion; Extension   L Elbow AROM;Elbow flexion;Elbow extension   L Wrist AROM; Wrist flexion;Wrist extension   L Hand AROM; Thumb; Index finger; Long finger;Ring finger;Little finger   L Position   (Seated in recliner chair)   L Weight/Reps/Sets 10 reps x 2   LUE ROM Comment Patient continues to demonstrate difficulty with active elbow flexion; required active assist to get full elbow flexion/extension during exercises today, increased pain with elbow movement   Cognition   Overall Cognitive Status [facility]   Arousal/Participation Alert; Cooperative   Attention Within functional limits   Orientation Level Oriented X4   Following Commands Follows all commands and directions without difficulty   Activity Tolerance   Activity Tolerance Patient limited by fatigue   Assessment   Assessment Patient seen for OT treatment this AM  Patient reports increased fatigue today but agreeable to participate in OT treatment  Edema to dorsum of L hand noted, sling adjusted in order to have hand in more functional position and at end of session LUE elevated on pillow to assist in decreasing edema  Patient education provided on sling management and upper body ADLs in adherence to shoulder precautions, pt verbalized understanding and reports carter will assist her at home with upper body ADLs and sling management  Patient continues to demonstrate increased difficulty with active elbow flexion and is limited by pain and weakness  The patient's raw score on the AM-PAC Daily Activity inpatient short form is 17, standardized score is 37 26, less than 39 4  Patients at this level are likely to benefit from discharge to post-acute rehabilitation services  Please refer to the recommendation of the Occupational Therapist for safe discharge planning  Despite AM-PAC score, patient will have assist for all ADLs, sling management, with plans to discharge to carter's home who is able to support patient and has all recommended DME  At end of session patient seated in recliner with all needs met   Continue OT per POC   Plan   OT Frequency 5x/wk   Recommendation   OT Discharge Recommendation Home with home health rehabilitation   AM-PAC Daily Activity Inpatient   Lower Body Dressing 2   Bathing 2   Toileting 3   Upper Body Dressing 2   Grooming 4   Eating 4   Daily Activity Raw Score 17   Daily Activity Standardized Score (Calc for Raw Score >=11) 37 26   AM-PAC Applied Cognition Inpatient   Following a Speech/Presentation 4   Understanding Ordinary Conversation 4   Taking Medications 4   Remembering Where Things Are Placed or Put Away 4   Remembering List of 4-5 Errands 4   Taking Care of Complicated Tasks 4   Applied Cognition Raw Score 24   Applied Cognition Standardized Score 46 70   Licensure   [place] License Number  [place], [place] [id number]

## 2022-03-15 ENCOUNTER — APPOINTMENT (INPATIENT)
Dept: RADIOLOGY | Facility: HOSPITAL | Age: 72
DRG: 291 | End: 2022-03-15
Payer: MEDICARE

## 2022-03-15 LAB
ALBUMIN SERPL BCP-MCNC: 2.9 G/DL (ref 3.5–5)
ALP SERPL-CCNC: 85 U/L (ref 46–116)
ALT SERPL W P-5'-P-CCNC: 14 U/L (ref 12–78)
ANION GAP SERPL CALCULATED.3IONS-SCNC: 2 MMOL/L (ref 4–13)
AST SERPL W P-5'-P-CCNC: 12 U/L (ref 5–45)
BACTERIA BLD CULT: NORMAL
BACTERIA BLD CULT: NORMAL
BASOPHILS # BLD AUTO: 0.07 THOUSANDS/ΜL (ref 0–0.1)
BASOPHILS NFR BLD AUTO: 1 % (ref 0–1)
BILIRUB SERPL-MCNC: 0.23 MG/DL (ref 0.2–1)
BUN SERPL-MCNC: 40 MG/DL (ref 5–25)
CALCIUM ALBUM COR SERPL-MCNC: 8.5 MG/DL (ref 8.3–10.1)
CALCIUM SERPL-MCNC: 7.6 MG/DL (ref 8.3–10.1)
CHLORIDE SERPL-SCNC: 104 MMOL/L (ref 100–108)
CO2 SERPL-SCNC: 38 MMOL/L (ref 21–32)
CREAT SERPL-MCNC: 2.16 MG/DL (ref 0.6–1.3)
EOSINOPHIL # BLD AUTO: 0.55 THOUSAND/ΜL (ref 0–0.61)
EOSINOPHIL NFR BLD AUTO: 7 % (ref 0–6)
ERYTHROCYTE [DISTWIDTH] IN BLOOD BY AUTOMATED COUNT: 15.8 % (ref 11.6–15.1)
GFR SERPL CREATININE-BSD FRML MDRD: 22 ML/MIN/1.73SQ M
GLUCOSE SERPL-MCNC: 173 MG/DL (ref 65–140)
GLUCOSE SERPL-MCNC: 193 MG/DL (ref 65–140)
GLUCOSE SERPL-MCNC: 194 MG/DL (ref 65–140)
GLUCOSE SERPL-MCNC: 199 MG/DL (ref 65–140)
GLUCOSE SERPL-MCNC: 281 MG/DL (ref 65–140)
HCT VFR BLD AUTO: 28.8 % (ref 34.8–46.1)
HGB BLD-MCNC: 8.2 G/DL (ref 11.5–15.4)
IMM GRANULOCYTES # BLD AUTO: 0.06 THOUSAND/UL (ref 0–0.2)
IMM GRANULOCYTES NFR BLD AUTO: 1 % (ref 0–2)
LYMPHOCYTES # BLD AUTO: 1.34 THOUSANDS/ΜL (ref 0.6–4.47)
LYMPHOCYTES NFR BLD AUTO: 18 % (ref 14–44)
MCH RBC QN AUTO: 28.8 PG (ref 26.8–34.3)
MCHC RBC AUTO-ENTMCNC: 28.5 G/DL (ref 31.4–37.4)
MCV RBC AUTO: 101 FL (ref 82–98)
MONOCYTES # BLD AUTO: 0.95 THOUSAND/ΜL (ref 0.17–1.22)
MONOCYTES NFR BLD AUTO: 13 % (ref 4–12)
NEUTROPHILS # BLD AUTO: 4.49 THOUSANDS/ΜL (ref 1.85–7.62)
NEUTS SEG NFR BLD AUTO: 60 % (ref 43–75)
NRBC BLD AUTO-RTO: 0 /100 WBCS
PLATELET # BLD AUTO: 214 THOUSANDS/UL (ref 149–390)
PMV BLD AUTO: 10.2 FL (ref 8.9–12.7)
POTASSIUM SERPL-SCNC: 4.8 MMOL/L (ref 3.5–5.3)
PROT SERPL-MCNC: 5.9 G/DL (ref 6.4–8.2)
RBC # BLD AUTO: 2.85 MILLION/UL (ref 3.81–5.12)
SODIUM SERPL-SCNC: 144 MMOL/L (ref 136–145)
WBC # BLD AUTO: 7.46 THOUSAND/UL (ref 4.31–10.16)

## 2022-03-15 PROCEDURE — 85025 COMPLETE CBC W/AUTO DIFF WBC: CPT | Performed by: PHYSICIAN ASSISTANT

## 2022-03-15 PROCEDURE — 76770 US EXAM ABDO BACK WALL COMP: CPT

## 2022-03-15 PROCEDURE — 80053 COMPREHEN METABOLIC PANEL: CPT | Performed by: PHYSICIAN ASSISTANT

## 2022-03-15 PROCEDURE — 99232 SBSQ HOSP IP/OBS MODERATE 35: CPT | Performed by: INTERNAL MEDICINE

## 2022-03-15 PROCEDURE — 99232 SBSQ HOSP IP/OBS MODERATE 35: CPT | Performed by: STUDENT IN AN ORGANIZED HEALTH CARE EDUCATION/TRAINING PROGRAM

## 2022-03-15 PROCEDURE — 82948 REAGENT STRIP/BLOOD GLUCOSE: CPT

## 2022-03-15 RX ORDER — TORSEMIDE 20 MG/1
20 TABLET ORAL DAILY
Status: DISCONTINUED | OUTPATIENT
Start: 2022-03-16 | End: 2022-03-17

## 2022-03-15 RX ORDER — DOCUSATE SODIUM 100 MG/1
100 CAPSULE, LIQUID FILLED ORAL 2 TIMES DAILY
Status: DISCONTINUED | OUTPATIENT
Start: 2022-03-15 | End: 2022-03-22 | Stop reason: HOSPADM

## 2022-03-15 RX ADMIN — APIXABAN 5 MG: 5 TABLET, FILM COATED ORAL at 17:05

## 2022-03-15 RX ADMIN — INSULIN LISPRO 3 UNITS: 100 INJECTION, SOLUTION INTRAVENOUS; SUBCUTANEOUS at 12:19

## 2022-03-15 RX ADMIN — DOCUSATE SODIUM 100 MG: 100 CAPSULE ORAL at 12:19

## 2022-03-15 RX ADMIN — INSULIN LISPRO 1 UNITS: 100 INJECTION, SOLUTION INTRAVENOUS; SUBCUTANEOUS at 09:36

## 2022-03-15 RX ADMIN — NYSTATIN: 100000 POWDER TOPICAL at 09:37

## 2022-03-15 RX ADMIN — INSULIN LISPRO 1 UNITS: 100 INJECTION, SOLUTION INTRAVENOUS; SUBCUTANEOUS at 21:52

## 2022-03-15 RX ADMIN — ALBUMIN (HUMAN) 12.5 G: 12.5 INJECTION, SOLUTION INTRAVENOUS at 09:34

## 2022-03-15 RX ADMIN — APIXABAN 5 MG: 5 TABLET, FILM COATED ORAL at 09:35

## 2022-03-15 RX ADMIN — NYSTATIN: 100000 POWDER TOPICAL at 17:06

## 2022-03-15 RX ADMIN — METOPROLOL TARTRATE 50 MG: 50 TABLET, FILM COATED ORAL at 09:36

## 2022-03-15 RX ADMIN — INSULIN GLARGINE 20 UNITS: 100 INJECTION, SOLUTION SUBCUTANEOUS at 21:52

## 2022-03-15 RX ADMIN — DOCUSATE SODIUM 100 MG: 100 CAPSULE ORAL at 17:05

## 2022-03-15 RX ADMIN — INSULIN LISPRO 1 UNITS: 100 INJECTION, SOLUTION INTRAVENOUS; SUBCUTANEOUS at 17:06

## 2022-03-15 RX ADMIN — AMLODIPINE BESYLATE 5 MG: 5 TABLET ORAL at 09:36

## 2022-03-15 RX ADMIN — MONTELUKAST 10 MG: 10 TABLET, FILM COATED ORAL at 21:52

## 2022-03-15 RX ADMIN — METOPROLOL TARTRATE 50 MG: 50 TABLET, FILM COATED ORAL at 21:52

## 2022-03-15 RX ADMIN — PREGABALIN 100 MG: 100 CAPSULE ORAL at 09:35

## 2022-03-15 RX ADMIN — Medication: at 09:37

## 2022-03-15 RX ADMIN — FUROSEMIDE 60 MG: 10 INJECTION, SOLUTION INTRAMUSCULAR; INTRAVENOUS at 09:35

## 2022-03-15 RX ADMIN — PREGABALIN 100 MG: 100 CAPSULE ORAL at 17:05

## 2022-03-15 RX ADMIN — PRAVASTATIN SODIUM 80 MG: 80 TABLET ORAL at 15:52

## 2022-03-15 RX ADMIN — Medication: at 17:06

## 2022-03-15 RX ADMIN — LORATADINE 10 MG: 10 TABLET ORAL at 09:35

## 2022-03-15 RX ADMIN — LIDOCAINE 5% 2 PATCH: 700 PATCH TOPICAL at 09:34

## 2022-03-15 NOTE — ASSESSMENT & PLAN NOTE
· On arrival rapid a fib with RVR, responded to IV lopressor - continue 5 mg IV prn   · Continue metoprolol tartrate 50 mg PO BID at home   · DEMETRIS Vasc score 3, Dr Zaida Rodrigues cleared to resume Eliquis    · Continue Eliquis 5 mg p o  B i d

## 2022-03-15 NOTE — PLAN OF CARE
Problem: Nutrition/Hydration-ADULT  Goal: Nutrient/Hydration intake appropriate for improving, restoring or maintaining nutritional needs  Description: Monitor and assess patient's nutrition/hydration status for malnutrition  Collaborate with interdisciplinary team and initiate plan and interventions as ordered  Monitor patient's weight and dietary intake as ordered or per policy  Utilize nutrition screening tool and intervene as necessary  Determine patient's food preferences and provide high-protein, high-caloric foods as appropriate       INTERVENTIONS:  - Monitor oral intake, urinary output, labs, and treatment plans  - Assess nutrition and hydration status and recommend course of action  - Evaluate amount of meals eaten  - Assist patient with eating if necessary   - Allow adequate time for meals  - Recommend/ encourage appropriate diets, oral nutritional supplements, and vitamin/mineral supplements  - Order, calculate, and assess calorie counts as needed  - Recommend, monitor, and adjust tube feedings and TPN/PPN based on assessed needs  - Assess need for intravenous fluids  - Provide specific nutrition/hydration education as appropriate  - Include patient/family/caregiver in decisions related to nutrition  Outcome: Progressing     Problem: Prexisting or High Potential for Compromised Skin Integrity  Goal: Skin integrity is maintained or improved  Description: INTERVENTIONS:  - Identify patients at risk for skin breakdown  - Assess and monitor skin integrity  - Assess and monitor nutrition and hydration status  - Monitor labs   - Turn and reposition patient  - Assist with mobility/ambulation  - Relieve pressure over bony prominences  - Avoid friction and shearing  - Provide appropriate hygiene as needed including keeping skin clean and dry  - Evaluate need for skin moisturizer/barrier cream  - Collaborate with interdisciplinary team   - Patient/family teaching    Outcome: Progressing     Problem: Potential for Falls  Goal: Patient will remain free of falls  Description: INTERVENTIONS:  - Educate patient/family on patient safety including physical limitations  - Instruct patient to call for assistance with activity   - Consult OT/PT to assist with strengthening/mobility   - Keep Call bell within reach  - Keep bed low and locked with side rails adjusted as appropriate  - Keep care items and personal belongings within reach  - Initiate and maintain comfort rounds  - Make Fall Risk Sign visible to staff    - Initiate/Maintain alarm  - Obtain necessary fall risk management equipment:   - Apply yellow socks and bracelet for high fall risk patients  - Consider moving patient to room near nurses station  Outcome: Progressing     Problem: MOBILITY - ADULT  Goal: Maintain or return to baseline ADL function  Description: INTERVENTIONS:  - Educate patient/family on patient safety including physical limitations  - Instruct patient to call for assistance with activity   - Consult OT/PT to assist with strengthening/mobility   - Keep Call bell within reach  - Keep bed low and locked with side rails adjusted as appropriate  - Keep care items and personal belongings within reach  - Initiate and maintain comfort rounds  - Make Fall Risk Sign visible to staff    - Apply yellow socks and bracelet for high fall risk patients  - Consider moving patient to room near nurses station  Outcome: Progressing  Problem: PAIN - ADULT  Goal: Verbalizes/displays adequate comfort level or baseline comfort level  Description: Interventions:  - Encourage patient to monitor pain and request assistance  - Assess pain using appropriate pain scale  - Administer analgesics based on type and severity of pain and evaluate response  - Implement non-pharmacological measures as appropriate and evaluate response  - Consider cultural and social influences on pain and pain management  - Notify physician/advanced practitioner if interventions unsuccessful or patient reports new pain  Outcome: Progressing     Problem: INFECTION - ADULT  Goal: Absence or prevention of progression during hospitalization  Description: INTERVENTIONS:  - Assess and monitor for signs and symptoms of infection  - Monitor lab/diagnostic results  - Monitor all insertion sites, i e  indwelling lines, tubes, and drains  - Monitor endotracheal if appropriate and nasal secretions for changes in amount and color  - [place] appropriate cooling/warming therapies per order  - Administer medications as ordered  - Instruct and encourage patient and family to use good hand hygiene technique  - Identify and instruct in appropriate isolation precautions for identified infection/condition  Outcome: Progressing     Problem: SAFETY ADULT  Goal: Patient will remain free of falls  Description: INTERVENTIONS:  - Educate patient/family on patient safety including physical limitations  - Instruct patient to call for assistance with activity   - Consult OT/PT to assist with strengthening/mobility   - Keep Call bell within reach  - Keep bed low and locked with side rails adjusted as appropriate  - Keep care items and personal belongings within reach  - Initiate and maintain comfort rounds  - Make Fall Risk Sign visible to staff    - Initiate/Maintain alarm  - Obtain necessary fall risk management equipment:   - Apply yellow socks and bracelet for high fall risk patients  - Consider moving patient to room near nurses station  Outcome: Progressing  Goal: Maintain or return to baseline ADL function  Description: INTERVENTIONS:  - Educate patient/family on patient safety including physical limitations  - Instruct patient to call for assistance with activity   - Consult OT/PT to assist with strengthening/mobility   - Keep Call bell within reach  - Keep bed low and locked with side rails adjusted as appropriate  - Keep care items and personal belongings within reach  - Initiate and maintain comfort rounds  - Make Fall Risk Sign visible to staff    - Apply yellow socks and bracelet for high fall risk patients  - Consider moving patient to room near nurses station  Outcome: Progressing  Goal: Maintains/Returns to pre admission functional level  Description: INTERVENTIONS:  - Perform BMAT or MOVE assessment daily    - Set and communicate daily mobility goal to care team and patient/family/caregiver  - Collaborate with rehabilitation services on mobility goals if consulted  - Perform Range of Motion 3 times a day  - Reposition patient every 2 hours  - Dangle patient 3 times a day  - Stand patient 3 times a day  - Ambulate patient 3 times a day  - Out of bed to chair 3 times a day   - Out of bed for meals 3 times a day  - Out of bed for toileting  - Record patient progress and toleration of activity level   Outcome: Progressing     Problem: DISCHARGE PLANNING  Goal: Discharge to home or other facility with appropriate resources  Description: INTERVENTIONS:  - Identify barriers to discharge w/patient and caregiver  - Arrange for needed discharge resources and transportation as appropriate  - Identify discharge learning needs (meds, wound care, etc )  - Arrange for interpretive services to assist at discharge as needed  - Refer to Case Management Department for coordinating discharge planning if the patient needs post-hospital services based on physician/advanced practitioner order or complex needs related to functional status, cognitive ability, or social support system  Outcome: Progressing     Problem: Knowledge Deficit  Goal: Patient/family/caregiver demonstrates understanding of disease process, treatment plan, medications, and discharge instructions  Description: Complete learning assessment and assess knowledge base    Interventions:  - Provide teaching at level of understanding  - Provide teaching via preferred learning methods  Outcome: Progressing     Problem: CARDIOVASCULAR - ADULT  Goal: Maintains optimal cardiac output and hemodynamic stability  Description: INTERVENTIONS:  - Monitor I/O, vital signs and rhythm  - Monitor for S/S and trends of decreased cardiac output  - Assess quality of pulses, skin color and temperature  - Instruct patient to report change in severity of symptoms  Outcome: Progressing  Goal: Absence of cardiac dysrhythmias or at baseline rhythm  Description: INTERVENTIONS:  - Continuous cardiac monitoring, vital signs, obtain 12 lead EKG if ordered  - Administer antiarrhythmic and heart rate control medications as ordered  - Monitor electrolytes and administer replacement therapy as ordered  Outcome: Progressing     Problem: RESPIRATORY - ADULT  Goal: Achieves optimal ventilation and oxygenation  Description: INTERVENTIONS:  - Assess for changes in respiratory status  - Assess for changes in mentation and behavior  - Position to facilitate oxygenation and minimize respiratory effort  - Oxygen administered by appropriate delivery if ordered  - Initiate smoking cessation education as indicated  - Encourage broncho-pulmonary hygiene including cough, deep breathe, Incentive Spirometry  - Assess the need for suctioning and aspirate as needed  - Assess and instruct to report SOB or any respiratory difficulty  - Respiratory Therapy support as indicated  Outcome: Progressing

## 2022-03-15 NOTE — PROGRESS NOTES
Saul U  68   Progress Note [address] 1950, 73 y o  female MRN: [id number]  Unit/Bed#: [address] [place] [address] Encounter: [id number]  Primary Care Provider: Gregoria Mabry   Date and time admitted to hospital: 3/9/2022  4:26 PM    * Acute diastolic congestive heart failure [facility])  Assessment & Plan  Wt Readings from Last 3 Encounters:   03/15/22 128 kg (281 lb 4 9 oz)   03/08/22 135 kg (297 lb 9 9 oz)   03/03/22 127 kg (279 lb 15 8 oz)     · SOB POA, secondary to acute on chronic diastolic CHF  BNP 5992  CT shows small b/l plerual effusions   · Recent shoulder surgery, SEBASTIAN, not on diuretics at SNF (previously on torsemide 10mg/d)  · ECHO performed 3/10 shows EF 93%, systolic function low normal, borderline concentric hypertrophy as per Cardiology report  · Procalcitonin negative - would hold off abx for aspiration related air space opacities seen on CT    · CTA chest negative for pulmonary embolism   · Patient had developed some SOB on 3/13; repeat chest xray continues to show pulmonary congestion  · Patient is on IV Lasix 60 milligram q 12 hours along with albumin  · Patient to be transitioned to torsemide 20 milligrams tomorrow  · Patient currently requiring 2L NC O2, will need desaturation screening prior to discharge  · Continue metoprolol 50 mg q 12 hours  · Continue intake and output, daily weights  SEBASTIAN (acute kidney injury) ([facility] [address] )  Assessment & Plan  · Creatinine continues to get worse and is up to 2 6  · Baseline Cr 1 4-1 6 with multiple episodes of SEBASTIAN  · Likely secondary to CRS in setting of CHF exacerbation complicated with contrast induced nephropathy  · Had CT contrast 3/9  · Nephrology consulted:  · Follow-up renal ultrasound  · Avoid nephrotoxins and hypotension  · Monitor BMP  · No urgent indication for dialysis  · Patient is on Lasix 60 milligram IV q 12 hours and received a dose this morning    Patient to be transitioned to torsemide 20 milligram tomorrow  · Might need accept higher creatinine to maintain euvolemic state    Paroxysmal atrial fibrillation (HCC)  Assessment & Plan  · On arrival rapid a fib with RVR, responded to IV lopressor - continue 5 mg IV prn   · Continue metoprolol tartrate 50 mg PO BID at home   · DEMETRIS Vasc score 3, Dr Meche Mcgowan cleared to resume Eliquis    · Continue Eliquis 5 mg p o  B i d  Anemia  Assessment & Plan  · Hgb stable, likely worsened secondary to post op loss     Morbid obesity with BMI of 45 0-49 9, adult ([facility] [address] )  Assessment & Plan  · Counseled weight loss   · Sleep apnea, refusing CPAP     Status post reverse total replacement of left shoulder  Assessment & Plan  · S/p fall resulting in Comminuted displaced fracture of the proximal humerus (2/23/22)  · S/p left shoulder reverse complete arthroplasty (3/1/22)   · Cleared from ortho standpoint for patient to start Eliquis   · LUE venous duplex is negative for DVT  · PT / OT consults appreciated; recommending home with therapy      Diabetes mellitus, type 2 [facility])  Assessment & Plan  Lab Results   Component Value Date    HGBA1C 6 8 (H) 03/09/2022     Recent Labs     03/14/22  1608 03/14/22  1949 03/15/22  0720 03/15/22  1137   POCGLU 238* 206* 173* 281*     Blood Sugar Average: Last 72 hrs:  (P) 188 [id number]   · Lantus 20 units SQ daily   · A1c indicated good control   · Continue Humalog sliding scale    Essential hypertension  Assessment & Plan  · On lopressor PO; increased to 50 mg p o  Q 12 hours during hospitalization  · Continue Norvasc    Mixed hyperlipidemia  Assessment & Plan  · On rosuvastatin  · Heart healthy diet      VTE Pharmacologic Prophylaxis: VTE Score: 17 High Risk (Score >/= 5) - Pharmacological DVT Prophylaxis Ordered: apixaban (Eliquis)  Sequential Compression Devices Ordered  Patient Centered Rounds: I performed bedside rounds with nursing staff today    Discussions with Specialists or Other Care Team Provider: yes    Education and Discussions with Family / Patient: Attempted to update  (significant other) via phone  Left voicemail  Time Spent for Care: 45 minutes  More than 50% of total time spent on counseling and coordination of care as described above  Current Length of Stay: 6 day(s)  Current Patient Status: Inpatient   Certification Statement: The patient will continue to require additional inpatient hospital stay due to Acute kidney injury, acute CHF  Discharge Plan: Anticipate discharge in 24-48 hrs to home with home services  Code Status: Level 1 - Full Code    Subjective:   Patient did not have a bowel movement and had a very small bowel movement yesterday  Improved shortness of breath  Patient had been urinating well  Objective:     Vitals:   Temp (24hrs), Av 5 °F (36 4 °C), Min:97 2 °F (36 2 °C), Max:97 7 °F (36 5 °C)    Temp:  [97 2 °F (36 2 °C)-97 7 °F (36 5 °C)] 97 2 °F (36 2 °C)  HR:  [71-82] 71  Resp:  [17-20] 20  BP: (134-139)/(61-64) 134/64  SpO2:  [94 %-98 %] 96 %  Body mass index is 51 45 kg/m²  Input and Output Summary (last 24 hours): Intake/Output Summary (Last 24 hours) at 3/15/2022 1459  Last data filed at 3/15/2022 0535  Gross per 24 hour   Intake --   Output 950 ml   Net -950 ml       Physical Exam:   Physical Exam  Constitutional:       Appearance: Normal appearance  HENT:      Head: Normocephalic and atraumatic  Nose: Nose normal       Mouth/Throat:      Mouth: Mucous membranes are moist       Pharynx: Oropharynx is clear  Eyes:      Extraocular Movements: Extraocular movements intact  Pupils: Pupils are equal, round, and reactive to light  Cardiovascular:      Rate and Rhythm: Normal rate and regular rhythm  Pulmonary:      Effort: Pulmonary effort is normal       Breath sounds: Rales present  Comments: Few basilar crackles  Abdominal:      General: Bowel sounds are normal  There is no distension  Palpations: Abdomen is soft  Tenderness:  There is no abdominal tenderness  Musculoskeletal:         General: No swelling  Cervical back: Normal range of motion and neck supple  Right lower leg: Edema present  Left lower leg: Edema present  Comments: Bilateral +1 pedal edema   Skin:     General: Skin is warm and dry  Neurological:      General: No focal deficit present  Mental Status: She is alert          Additional Data:     Labs:  Results from last 7 days   Lab Units 03/15/22  0503   WBC Thousand/uL 7 46   HEMOGLOBIN g/dL 8 2*   HEMATOCRIT % 28 8*   PLATELETS Thousands/uL 214   NEUTROS PCT % 60   LYMPHS PCT % 18   MONOS PCT % 13*   EOS PCT % 7*     Results from last 7 days   Lab Units 03/15/22  0503   SODIUM mmol/L 144   POTASSIUM mmol/L 4 8   CHLORIDE mmol/L 104   CO2 mmol/L 38*   BUN mg/dL 40*   CREATININE mg/dL 2 16*   ANION GAP mmol/L 2*   CALCIUM mg/dL 7 6*   ALBUMIN g/dL 2 9*   TOTAL BILIRUBIN mg/dL 0 23   ALK PHOS U/L 85   ALT U/L 14   AST U/L 12   GLUCOSE RANDOM mg/dL 194*     Results from last 7 days   Lab Units 03/10/22  0513   INR  1 34*     Results from last 7 days   Lab Units 03/15/22  1137 03/15/22  0720 03/14/22  1949 03/14/22  1608 03/14/22  1150 03/14/22  0713 03/13/22  2019 03/13/22  1543 03/13/22  1113 03/13/22  0614 03/12/22  2016 03/12/22  1658   POC GLUCOSE mg/dl 281* 173* 206* 238* 164* 158* 211* 199* 223* 167* 249* 191*     Results from last 7 days   Lab Units 03/09/22  1830   HEMOGLOBIN A1C % 6 8*     Results from last 7 days   Lab Units 03/10/22  0513 03/09/22  2106   PROCALCITONIN ng/ml 0 08 0 08       Lines/Drains:  Invasive Devices  Report    Peripheral Intravenous Line            Peripheral IV 03/13/22 Right Antecubital 1 day          Drain            External Urinary Catheter 2 days                      Imaging: Reviewed radiology reports from this admission including: chest xray    Recent Cultures (last 7 days):   Results from last 7 days   Lab Units 03/10/22  0349 03/09/22  2126 03/09/22  2106   BLOOD CULTURE   -- No Growth After 5 Days  No Growth After 5 Days  URINE CULTURE  <10,000 cfu/ml   --   --        Last 24 Hours Medication List:   Current Facility-Administered Medications   Medication Dose Route Frequency Provider Last Rate    albuterol  2 puff Inhalation Q6H PRN Christian Santiago MD      aluminum-magnesium hydroxide-simethicone  30 mL Oral Q6H PRN Christian Santiago MD      amLODIPine  5 mg Oral Daily Christian Santiago MD      ammonium lactate   Topical BID Christian Santiago MD      apixaban  5 mg Oral BID Christian Snatiago MD      docusate sodium  100 mg Oral BID Isabelle Butler MD      insulin glargine  20 Units Subcutaneous HS Christian Santiago MD      insulin lispro  1-5 Units Subcutaneous TID [facility] Christian Santiago MD      insulin lispro  1-5 Units Subcutaneous HS Christian Santiago MD      lidocaine  2 patch Topical Daily Christian Santiago MD      loratadine  10 mg Oral Daily Christian Santiago MD      magnesium hydroxide  30 mL Oral Daily PRN Christian Santiago MD      metoprolol  5 mg Intravenous Q6H PRN Christian Santiago MD      metoprolol tartrate  50 mg Oral Q12H Sahra Christensen MD      montelukast  10 mg Oral HS Christian Santiago MD      nitroglycerin  0 4 mg Sublingual Q5 Min PRN Christian Santiago MD      nystatin   Topical BID Christian Santiago MD      ondansetron  4 mg Intravenous Q6H PRN Christian Santiago MD      pravastatin  80 mg Oral Daily With Dilshad Calhoun MD      pregabalin  100 mg Oral BID MD Oneil Fagan ON 3/16/2022] torsemide  20 mg Oral Daily Philip Hodge MD      traMADol  50 mg Oral Q6H PRN Pedro Rm PA-C          Today, Patient Was Seen By: Isabelle Butler MD    **Please Note: This note may have been constructed using a voice recognition system  **

## 2022-03-15 NOTE — ASSESSMENT & PLAN NOTE
Lab Results   Component Value Date    HGBA1C 6 8 (H) 03/09/2022     Recent Labs     03/14/22  1608 03/14/22  1949 03/15/22  0720 03/15/22  1137   POCGLU 238* 206* 173* 281*     Blood Sugar Average: Last 72 hrs:  (P) 188 [id number]   · Lantus 20 units SQ daily   · A1c indicated good control   · Continue Humalog sliding scale

## 2022-03-15 NOTE — PLAN OF CARE
Problem: Nutrition/Hydration-ADULT  Goal: Nutrient/Hydration intake appropriate for improving, restoring or maintaining nutritional needs  Description: Monitor and assess patient's nutrition/hydration status for malnutrition  Collaborate with interdisciplinary team and initiate plan and interventions as ordered  Monitor patient's weight and dietary intake as ordered or per policy  Utilize nutrition screening tool and intervene as necessary  Determine patient's food preferences and provide high-protein, high-caloric foods as appropriate       INTERVENTIONS:  - Monitor oral intake, urinary output, labs, and treatment plans  - Assess nutrition and hydration status and recommend course of action  - Evaluate amount of meals eaten  - Assist patient with eating if necessary   - Allow adequate time for meals  - Recommend/ encourage appropriate diets, oral nutritional supplements, and vitamin/mineral supplements  - Order, calculate, and assess calorie counts as needed  - Recommend, monitor, and adjust tube feedings and TPN/PPN based on assessed needs  - Assess need for intravenous fluids  - Provide specific nutrition/hydration education as appropriate  - Include patient/family/caregiver in decisions related to nutrition  Outcome: Progressing     Problem: Prexisting or High Potential for Compromised Skin Integrity  Goal: Skin integrity is maintained or improved  Description: INTERVENTIONS:  - Identify patients at risk for skin breakdown  - Assess and monitor skin integrity  - Assess and monitor nutrition and hydration status  - Monitor labs   - Turn and reposition patient  - Assist with mobility/ambulation  - Relieve pressure over bony prominences  - Avoid friction and shearing  - Provide appropriate hygiene as needed including keeping skin clean and dry  - Evaluate need for skin moisturizer/barrier cream  - Collaborate with interdisciplinary team   - Patient/family teaching    Outcome: Progressing     Problem: Potential for Falls  Goal: Patient will remain free of falls  Description: INTERVENTIONS:  - Educate patient/family on patient safety including physical limitations  - Instruct patient to call for assistance with activity   - Consult OT/PT to assist with strengthening/mobility   - Keep Call bell within reach  - Keep bed low and locked with side rails adjusted as appropriate  - Keep care items and personal belongings within reach  - Initiate and maintain comfort rounds  - Make Fall Risk Sign visible to staff    - Initiate/Maintain alarm  - Obtain necessary fall risk management equipment:   - Apply yellow socks and bracelet for high fall risk patients  - Consider moving patient to room near nurses station  Outcome: Progressing     Problem: MOBILITY - ADULT  Goal: Maintain or return to baseline ADL function  Description: INTERVENTIONS:  - Educate patient/family on patient safety including physical limitations  - Instruct patient to call for assistance with activity   - Consult OT/PT to assist with strengthening/mobility   - Keep Call bell within reach  - Keep bed low and locked with side rails adjusted as appropriate  - Keep care items and personal belongings within reach  - Initiate and maintain comfort rounds  - Make Fall Risk Sign visible to staff    - Apply yellow socks and bracelet for high fall risk patients  - Consider moving patient to room near nurses station  Outcome: Progressing  Goal: Maintains/Returns to pre admission functional level  Description: INTERVENTIONS:  - Perform BMAT or MOVE assessment daily    - Set and communicate daily mobility goal to care team and patient/family/caregiver  - Collaborate with rehabilitation services on mobility goals if consulted  - Perform Range of Motion 3 times a day  - Reposition patient every 2 hours    - Dangle patient 3 times a day  - Stand patient 3 times a day  - Ambulate patient 3 times a day  - Out of bed to chair 3 times a day   - Out of bed for meals 3 times a day  - Out of bed for toileting  - Record patient progress and toleration of activity level   Outcome: Progressing     Problem: PAIN - ADULT  Goal: Verbalizes/displays adequate comfort level or baseline comfort level  Description: Interventions:  - Encourage patient to monitor pain and request assistance  - Assess pain using appropriate pain scale  - Administer analgesics based on type and severity of pain and evaluate response  - Implement non-pharmacological measures as appropriate and evaluate response  - Consider cultural and social influences on pain and pain management  - Notify physician/advanced practitioner if interventions unsuccessful or patient reports new pain  Outcome: Progressing     Problem: INFECTION - ADULT  Goal: Absence or prevention of progression during hospitalization  Description: INTERVENTIONS:  - Assess and monitor for signs and symptoms of infection  - Monitor lab/diagnostic results  - Monitor all insertion sites, i e  indwelling lines, tubes, and drains  - Monitor endotracheal if appropriate and nasal secretions for changes in amount and color  - [place] appropriate cooling/warming therapies per order  - Administer medications as ordered  - Instruct and encourage patient and family to use good hand hygiene technique  - Identify and instruct in appropriate isolation precautions for identified infection/condition  Outcome: Progressing     Problem: DISCHARGE PLANNING  Goal: Discharge to home or other facility with appropriate resources  Description: INTERVENTIONS:  - Identify barriers to discharge w/patient and caregiver  - Arrange for needed discharge resources and transportation as appropriate  - Identify discharge learning needs (meds, wound care, etc )  - Arrange for interpretive services to assist at discharge as needed  - Refer to Case Management Department for coordinating discharge planning if the patient needs post-hospital services based on physician/advanced practitioner order or complex needs related to functional status, cognitive ability, or social support system  Outcome: Progressing     Problem: Knowledge Deficit  Goal: Patient/family/caregiver demonstrates understanding of disease process, treatment plan, medications, and discharge instructions  Description: Complete learning assessment and assess knowledge base    Interventions:  - Provide teaching at level of understanding  - Provide teaching via preferred learning methods  Outcome: Progressing     Problem: CARDIOVASCULAR - ADULT  Goal: Maintains optimal cardiac output and hemodynamic stability  Description: INTERVENTIONS:  - Monitor I/O, vital signs and rhythm  - Monitor for S/S and trends of decreased cardiac output  - Assess quality of pulses, skin color and temperature  - Instruct patient to report change in severity of symptoms  Outcome: Progressing  Goal: Absence of cardiac dysrhythmias or at baseline rhythm  Description: INTERVENTIONS:  - Continuous cardiac monitoring, vital signs, obtain 12 lead EKG if ordered  - Administer antiarrhythmic and heart rate control medications as ordered  - Monitor electrolytes and administer replacement therapy as ordered  Outcome: Progressing     Problem: RESPIRATORY - ADULT  Goal: Achieves optimal ventilation and oxygenation  Description: INTERVENTIONS:  - Assess for changes in respiratory status  - Assess for changes in mentation and behavior  - Position to facilitate oxygenation and minimize respiratory effort  - Oxygen administered by appropriate delivery if ordered  - Initiate smoking cessation education as indicated  - Encourage broncho-pulmonary hygiene including cough, deep breathe, Incentive Spirometry  - Assess the need for suctioning and aspirate as needed  - Assess and instruct to report SOB or any respiratory difficulty  - Respiratory Therapy support as indicated  Outcome: Progressing

## 2022-03-15 NOTE — PROGRESS NOTES
Progress Note - Cardiology   [facility]     Janis Beckford 73 y o  female MRN: [id number]  : 1950  Unit/Bed#: [address] [place] [address] Encounter: [id number]    Assessment and Plan:   1  Acute on chronic diastolic heart failure:  Noted patient was not discharged on her home dose of torsemide    -  patient diuretics were escalated to Lasix 60 mg b i d  IV over the weekend    -  diuretics per Nephrology    -  low-sodium diet    -  I&O, daily weights and monitor labs    2  Acute kidney injury on chronic kidney disease:  Previous baseline creatinine was 1 4 to 1 6 in 2019     Creatinine today is 2 16    -  followed by Nephrology    -  may need to readjust patient's baseline creatinine to keep her euvolemic      3  Superficial thrombophlebitis of left cephalic vein:  Eliquis resumed     4  Paroxysmal atrial fibrillation:  Currently sinus rhythm    -  Eliquis 5 mg b i d  Was resumed during this admission    -  continue Lopressor 50 mg q 12 hours      5  Hypertension:  Blood pressure stable on Lopressor 50 mg b i d  And Norvasc 5 mg daily    -  continue to monitor     6  Postop Anemia:  Hemoglobin stable     7  Left total shoulder replacement:  2022     Subjective / Objective:   Patient seen and examined she states that her breathing is slowly improving from how she felt over the weekend  Still does not feel that she is back to her baseline  Creatinine elevated, nephrology consult appreciated  Vitals: Blood pressure 134/64, pulse 71, temperature (!) 97 2 °F (36 2 °C), resp  rate 20, height 5' 2" (1 575 m), weight 128 kg (281 lb 4 9 oz), SpO2 96 %, not currently breastfeeding  Vitals:    22 0600 03/15/22 0600   Weight: 127 kg (279 lb 15 8 oz) 128 kg (281 lb 4 9 oz)     Body mass index is 51 45 kg/m²    BP Readings from Last 3 Encounters:   03/15/22 134/64   22 144/66   22 164/69     Orthostatic Blood Pressures      Most Recent Value   Blood Pressure 134/64 filed at 03/15/2022 9636   Patient Position - Orthostatic VS Lying filed at 03/14/2022 2322        I/O       03/13 0701  03/14 0700 03/14 0701  03/15 0700 03/15 0701  03/16 0700    P  O  150      Total Intake(mL/kg) 150 (1 2)      Urine (mL/kg/hr) 1475 (0 5) 1600 (0 5)     Total Output 1475 1600     Net -1325 -1600            Unmeasured Urine Occurrence 1 x          Invasive Devices  Report    Peripheral Intravenous Line            Peripheral IV 03/13/22 Right Antecubital 1 day          Drain            External Urinary Catheter 1 day                  Intake/Output Summary (Last 24 hours) at 3/15/2022 1455  Last data filed at 3/15/2022 0535  Gross per 24 hour   Intake --   Output 1600 ml   Net -1600 ml         Physical Exam:   Physical Exam  Vitals and nursing note reviewed  Constitutional:       Appearance: Normal appearance  She is morbidly obese  HENT:      Right Ear: External ear normal       Left Ear: External ear normal       Nose: Nose normal    Eyes:      General: No scleral icterus  Right eye: No discharge  Left eye: No discharge  Cardiovascular:      Rate and Rhythm: Normal rate and regular rhythm  Pulmonary:      Breath sounds: Examination of the right-lower field reveals decreased breath sounds  Examination of the left-lower field reveals decreased breath sounds  Decreased breath sounds present  Comments: Coarse breath sounds in upper airways  Abdominal:      General: Bowel sounds are normal  There is no distension  Palpations: Abdomen is soft  Musculoskeletal:      Right lower leg: Edema present  Left lower leg: Edema present  Skin:     General: Skin is warm and dry  Capillary Refill: Capillary refill takes less than 2 seconds  Neurological:      General: No focal deficit present  Mental Status: She is alert and oriented to person, place, and time  Mental status is at baseline     Psychiatric:         Mood and Affect: Mood normal                    Medications/ Allergies: Current Facility-Administered Medications   Medication Dose Route Frequency Provider Last Rate    albumin human  12 5 g Intravenous BID (diuretic) RK Hernandez      albuterol  2 puff Inhalation Q6H PRN Alan Rather, MD      aluminum-magnesium hydroxide-simethicone  30 mL Oral Q6H PRN Alan Rather, MD      amLODIPine  5 mg Oral Daily Alan Rather, MD      ammonium lactate   Topical BID Alan Rather, MD      apixaban  5 mg Oral BID Alan Rather, MD      furosemide  60 mg Intravenous BID (diuretic) RK Hernandez      insulin glargine  20 Units Subcutaneous HS Alan Rather, MD      insulin lispro  1-5 Units Subcutaneous TID [facility] Alan Rather, MD      insulin lispro  1-5 Units Subcutaneous HS Alan Rather, MD      lidocaine  2 patch Topical Daily Alan Rather, MD      loratadine  10 mg Oral Daily Alan Rather, MD      magnesium hydroxide  30 mL Oral Daily PRN Alan Rather, MD      metoprolol  5 mg Intravenous Q6H PRN Alan Rather, MD      metoprolol tartrate  50 mg Oral Q12H [address] Alan Rather, MD      montelukast  10 mg Oral HS Alan Rather, MD      nitroglycerin  0 4 mg Sublingual Q5 Min PRN Alan Rather, MD      nystatin   Topical BID Alan Rather, MD      ondansetron  4 mg Intravenous Q6H PRN Alan Rather, MD      pravastatin  80 mg Oral Daily With Jamie Farmer MD      pregabalin  100 mg Oral BID Alan Rather, MD      traMADol  50 mg Oral Q6H PRN Mikey Rm PA-C       albuterol, 2 puff, Q6H PRN  aluminum-magnesium hydroxide-simethicone, 30 mL, Q6H PRN  magnesium hydroxide, 30 mL, Daily PRN  metoprolol, 5 mg, Q6H PRN  nitroglycerin, 0 4 mg, Q5 Min PRN  ondansetron, 4 mg, Q6H PRN  traMADol, 50 mg, Q6H PRN      Allergies   Allergen Reactions    Penicillins Hives    Moxifloxacin Other (See Comments)     unknown    Percocet [Oxycodone-Acetaminophen] Other (See Comments)     unknown    Zinc Acetate Other (See Comments)     unknown    Nuts - Food Allergy Other (See Comments)    Asa [Aspirin] GI Intolerance    Indocin [Indomethacin] Other (See Comments)     Made patient "loopy"    Other Other (See Comments)     unknown       VTE Pharmacologic Prophylaxis:   Sequential compression device (Venodyne)  and Eliquis    Labs:   Troponins:  Results from last 7 days   Lab Units 03/09/22  2228 03/09/22  2040   HSTNI D2 ng/L  --  0   HSTNI D4 ng/L 0  --      CBC with diff:  Results from last 7 days   Lab Units 03/15/22  0503 03/14/22  0515 03/13/22  0445 03/10/22  0513 03/09/22  1830 03/08/22  1736   WBC Thousand/uL 7 46 7 82 8 23 6 19 6 19 6 02   HEMOGLOBIN g/dL 8 2* 8 8* 8 7* 8 9* 9 2* 8 8*   HEMATOCRIT % 28 8* 29 6* 29 7* 30 9* 31 7* 29 9*   MCV fL 101* 98 97 99* 96 96   PLATELETS Thousands/uL 214 223 253 300 356 331   MCH pg 28 8 29 1 28 4 28 4 28 0 28 3   MCHC g/dL 28 5* 29 7* 29 3* 28 8* 29 0* 29 4*   RDW % 15 8* 15 5* 15 5* 15 7* 15 5* 15 8*   MPV fL 10 2 10 2 10 1 9 9 10 0 9 8   NRBC AUTO /100 WBCs 0  --   --  0 0 0     CMP:  Results from last 7 days   Lab Units 03/15/22  0503 03/14/22  0515 03/13/22  0445 03/12/22  0426 03/10/22  0513 03/09/22  1830 03/08/22  1736   SODIUM mmol/L 144 143 143 144 146* 146* 145   POTASSIUM mmol/L 4 8 4 5 4 5 4 3 4 2 4 6 4 6   CHLORIDE mmol/L 104 105 106 106 107 108 107   CO2 mmol/L 38* 38* 33* 34* 32 31 27   ANION GAP mmol/L 2* 0* 4 4 7 7 11   BUN mg/dL 40* 34* 33* 32* 30* 32* 37*   CREATININE mg/dL 2 16* 1 89* 1 85* 1 78* 1 24 1 14 1 24   CALCIUM mg/dL 7 6* 7 6* 7 6* 7 6* 8 2* 8 4 8 2*   AST U/L 12 16  --   --  14 13 22   ALT U/L 14 20  --   --  18 17 18   ALK PHOS U/L 85 87  --   --  79 84 83   TOTAL PROTEIN g/dL 5 9* 5 9*  --   --  6 3* 6 2* 6 2*   ALBUMIN g/dL 2 9* 2 6*  --   --  2 5* 2 4* 2 3*   TOTAL BILIRUBIN mg/dL 0 23 0 29  --   --  0 32 0 41 0 52   EGFR ml/min/1 73sq m 22 26 27 28 44 49 44     Magnesium:  Results from last 7 days   Lab Units 03/10/22  0513 03/09/22  1830 03/08/22  1736   MAGNESIUM mg/dL 2 1 2 3 2 5     Coags:  Results from last 7 days   Lab Units 03/10/22  0513 03/09/22  1830 03/08/22  1736   PTT seconds 40* 37 41*   INR  1 34* 1 22* 1 36*     TSH:  Results from last 7 days   Lab Units 03/09/22  1830   TSH 3RD GENERATON uIU/mL 3 550     No components found for: TSH3  Lipid Profile:  Results from last 7 days   Lab Units 03/10/22  0513   CHOLESTEROL mg/dL 104   TRIGLYCERIDES mg/dL 132   HDL mg/dL 30*   LDL CALC mg/dL 48     Hgb A1c:  Results from last 7 days   Lab Units 03/09/22  1830   HEMOGLOBIN A1C % 6 8*       Imaging & Testing   I have personally reviewed pertinent reports  XR chest portable    Result Date: 3/13/2022  Narrative: CHEST INDICATION:   sob  COMPARISON:  Chest radiograph March 10, 2022  Correlation with chest CT March 9, 2022 EXAM PERFORMED/VIEWS:  XR CHEST PORTABLE FINDINGS: Cardiomediastinal silhouette appears unremarkable  Persistent mild prominence of the interstitial markings  Limited assessment for pleural effusions; no large pleural effusion  No pneumothorax  Partially imaged left shoulder reverse arthroplasty  Impression: Pulmonary vascular congestion is similar to prior study  Workstation performed: [id number]     XR chest portable    Result Date: 3/10/2022  Narrative: CHEST INDICATION:   sob  fu on status of fluid overload  COMPARISON:  3/9/2022  EXAM PERFORMED/VIEWS:  XR CHEST PORTABLE Images:  1 FINDINGS: Cardiac and mediastinal contours are stable and within normal limits  The aorta is calcified  There is increased pulmonary vascular congestion and development of small bilateral pleural effusions  No pneumothorax or focal airspace consolidation  Left shoulder reverse total arthroplasty partially seen  Impression: Increased pulmonary vascular congestion and development of small bilateral pleural effusions  Workstation performed: [id number]     XR chest 1 view portable    Result Date: 3/10/2022  Narrative: CHEST INDICATION:   shortness of breath   COMPARISON:  3/8/2022 ; 7/31/2019 EXAM PERFORMED/VIEWS:  XR CHEST PORTABLE FINDINGS:  Mild hypoventilation is present  Cardiomediastinal silhouette appears unremarkable  Subsegmental atelectasis at the bases is noted probably related to hypoventilation although some developing edema is difficult to exclude  No pneumothorax or pleural effusion  Left shoulder replacement is present  Impression: Hypoventilation with subsegmental atelectasis at the bases  Developing edema or inflammatory infiltrates are difficult to exclude  Please see subsequent CT for further report Workstation performed: [id number]     XR chest 1 view portable    Result Date: 3/9/2022  Narrative: CHEST INDICATION:   chest pain  COMPARISON:  7/31/2019 EXAM PERFORMED/VIEWS:  XR CHEST PORTABLE Single view FINDINGS: Cardiomediastinal silhouette appears unremarkable  The lungs are clear  No pneumothorax or pleural effusion  Reverse left shoulder arthroplasty has been performed since the prior study     Impression: No acute cardiopulmonary disease  Findings are stable except for left shoulder arthroplasty Workstation performed: [id number]     VAS upper limb venous duplex scan, unilateral/limited    Result Date: 3/10/2022  Narrative:  [facility] REPORT CLINICAL: Indications:  Patient presents with left upper extremity edema x  several days  S/P left shoulder surgery 3/1/2022  Operative History: Patient denies any cardiovascular surgeries  Risk Factors The patient has history of HTN, Diabetes (Yes), Hyperlipidemia, CKD and previous smoking (quit >10yrs ago)  FINDINGS:  Left          Thrombus  Ceph Mid Arm  Acute        CONCLUSION: Impression  RIGHT UPPER LIMB LIMITED: Evaluation shows no evidence of thrombus in the internal jugular vein, subclavian vein, and the brachiocephalic vein  LEFT UPPER LIMB: Evidence of superficial thrombophlebitis noted in the cephalic vein  No evidence of acute or chronic deep vein thrombosis   Doppler evaluation shows a normal response to augmentation maneuvers  Technically difficult/limited study due to immobile shoulder, pitting edema and poor visualization of deep system  SIGNATURE: Electronically Signed by: Heena Marcial MD on 2022-03-10 06:23:16 PM    CTA ED chest PE Study    Result Date: 3/9/2022  Narrative: CTA - CHEST WITH IV CONTRAST - PULMONARY ANGIOGRAM INDICATION:   Shortness of breath  COMPARISON: 3/9/2022  TECHNIQUE: CTA examination of the chest was performed using angiographic technique according to a protocol specifically tailored to evaluate for pulmonary embolism  Axial, sagittal, and coronal 2D reformatted images were created from the source data and  submitted for interpretation  In addition, coronal 3D MIP postprocessing was performed on the acquisition scanner  Radiation dose length product (DLP) for this visit:  684 94 mGy-cm   This examination, like all CT scans performed in the [facility], was performed utilizing techniques to minimize radiation dose exposure, including the use of iterative  reconstruction and automated exposure control  IV Contrast:  85 mL of iohexol (OMNIPAQUE)  FINDINGS: PULMONARY ARTERIAL TREE:  No filling defect in the visualized pulmonary arteries to suggest acute embolus  LUNGS:  Bibasilar atelectasis  Few small airspace opacities scattered throughout the right lung could represent mild aspiration  No pulmonary interstitial edema  There is no tracheal or endobronchial lesion  PLEURA:  Small bilateral pleural effusions  HEART/GREAT VESSELS: Mild cardiomegaly  Aberrant right subclavian artery  No thoracic aortic aneurysm or dissection  MEDIASTINUM AND LUIGI:  No lymphadenopathy  CHEST WALL AND LOWER NECK:   Unremarkable  VISUALIZED STRUCTURES IN THE UPPER ABDOMEN:  Unremarkable  OSSEOUS STRUCTURES:  No acute fracture or destructive osseous lesion  Impression: 1  Small bilateral pleural effusions and bibasilar atelectasis    Few airspace opacities in the right lung could represent mild aspiration  2   No evidence of acute pulmonary embolus, thoracic aortic aneurysm or dissection  Workstation performed: [id number]     [address] [facility] bedside procedure    Result Date: 3/1/2022  Narrative: 1 2 [id number] [id number]  3 [id number]  0 [id number]    Echo complete w/ contrast if indicated    Result Date: 3/11/2022  Narrative: Kirstin Courser  Left Ventricle: Left ventricular cavity size is normal  Wall thickness is mildly increased  The left ventricular ejection fraction is 53% by biplane measurement  Systolic function is low normal  Wall motion is normal  There is borderline concentric hypertrophy    Right Ventricle: Systolic function is low normal  Normal tricuspid annular plane systolic excursion (TAPSE) > 1 7 cm    Study Details: This transthoracic echocardiogram was performed at bedside  This was a routine, inpatient study  Study quality was poor  This was a technically difficult study due to decreased penetration, restricted mobility and poor acoustic windows  A complete 2D, color flow Doppler, spectral Doppler, 2D, color flow Doppler and spectral Doppler transthoracic echocardiogram was performed  The apical, parasternal, subcostal and suprasternal views were obtained  0 6 ml/min of Definity ultrasound enhancing agent was used due to opacify the left ventricle  Ricci Denny  Prior TTE study available for comparison  Prior study date: 5/29/2019  No significant changes noted compared to the prior study  Dee Shelton    Cardiology      "This note has been constructed using a voice recognition system  Therefore there may be syntax, spelling, and/or grammatical errors   Please call if you have any questions  "

## 2022-03-15 NOTE — ASSESSMENT & PLAN NOTE
· Creatinine continues to get worse and is up to 2 6  · Baseline Cr 1 4-1 6 with multiple episodes of SEBASTIAN  · Likely secondary to CRS in setting of CHF exacerbation complicated with contrast induced nephropathy  · Had CT contrast 3/9  · Nephrology consulted:  · Follow-up renal ultrasound  · Avoid nephrotoxins and hypotension  · Monitor BMP  · No urgent indication for dialysis  · Patient is on Lasix 60 milligram IV q 12 hours and received a dose this morning    Patient to be transitioned to torsemide 20 milligram tomorrow  · Might need accept higher creatinine to maintain euvolemic state

## 2022-03-15 NOTE — PHYSICAL THERAPY NOTE
03/15/22 1258   PT Last Visit   PT Visit Date 03/15/22   Note Type   Note Type Cancelled Session   Cancel Reasons Other - pt having an issue with her IV, does not want to walk  Pt also awaiting an ultrasound of her kidneys   Licensure   [place] License Number  Josh Tuckerost PT [id number]     Portions of the documentation may have been created using voice recognition software  Occasional wrong word or sound alike substitutions may have occurred due to the inherent limitation of the voice recognition software  Read the chart carefully and recognize, using context, where substitutions have occurred

## 2022-03-15 NOTE — PROGRESS NOTES
NEPHROLOGY PROGRESS NOTE   Kole Sharp 73 y o  female MRN: [id number]  Unit/Bed#: [address] [place] [address] Encounter: [id number]  Reason for Consult: SEBASTIAN    ASSESSMENT AND PLAN:  73 y o  woman PMH of CKD G3b (creatinine were 1 4-1 6mg/dL) last time seen by Rani Quintana in 2019, with multiple episodes of SEBASTIAN, DM, obesity, HTN, CHF, recent fall s/p left shoulder fracture, AFib on Eliquis p/w chest pain, elevated creatinine and fluid overload  Nephrology is consulted for SEBASTIAN in diuretic management        PLAN     #Non-Oliguric KDIGO SEBASTIAN stage 1 on CKD G3bA2     · Etiology:  Likely secondary to CRS settings of CHF exacerbation complicated with contrast induced nephropathy  · Had CT with contrast on 3/9  · Baseline creatinine:  1 4-1 6 mg/dL with multiple episodes of SEBASTIAN  · Current creatinine:  2 60 mg/dL trending up , in the settings of diuresis  · Peak creatinine:  Trending  · UA:  Leukocyturia, no micro hematuria  · Renal imaging :   ordered  · Treatment:  ·  No urgent indication of dialysis at this time  · Maintain MAP:  Over 65 mmHg if possible/avoid hypoperfusion:  Hold parameters on blood pressure medications  · Avoid nephrotoxic agents such as NSAIDs, and IV contrast if possible  Avoid opioids   · Adjust medications to GFR     #CKD G3bA2  · Baseline creatinine:  1 4-1 6 mg/dL  · Etiology:  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        #Acid-base Disorder  · serum HCO3 38 millimoles per L  · Metabolic alkalosis likely secondary to vascular contraction in the settings of diuretics     #Volume status/hypertension:  · Volume:   more euvolemic, weight lost 5 kg, improving edema and shortness of breath  · Blood pressure:   normotensive /64, goal< 140/90mmhg  · Recommend:  · Low-sodium diet  · On diuretics:  Losing weight and good urinary output  · Lasix 60 mg IV this a m  · Plan to transition to torsemide 20 mg tomorrow a m    · Will accept higher levels of creatinine to achieve more appropriate fluid status  · Monitor urinary output, daily weight      #Anemia:  · Current hemoglobin:  8 2 mg/dL  · Treatment:  · Transfuse for hemoglobin less than 7 0 per primary service       # CHF exacerbation  · Diuretics as above  · Low-sodium and restriction      SUBJECTIVE:  Patient seen and examined at bedside  Patient is feeling better, shortness of breath is improving, no chest pain    No urinary complaints     OBJECTIVE:  Current Weight: Weight - Scale: 128 kg (281 lb 4 9 oz)  Vitals:    03/15/22 0716   BP: 134/64   Pulse: 71   Resp: 20   Temp: (!) 97 2 °F (36 2 °C)   SpO2: 96%       Intake/Output Summary (Last 24 hours) at 3/15/2022 0956  Last data filed at 3/15/2022 0535  Gross per 24 hour   Intake --   Output 1600 ml   Net -1600 ml     Wt Readings from Last 3 Encounters:   03/15/22 128 kg (281 lb 4 9 oz)   03/08/22 135 kg (297 lb 9 9 oz)   03/03/22 127 kg (279 lb 15 8 oz)     Temp Readings from Last 3 Encounters:   03/15/22 (!) 97 2 °F (36 2 °C)   03/09/22 97 9 °F (36 6 °C) (Tympanic)   03/07/22 (!) 97 4 °F (36 3 °C) (Oral)     BP Readings from Last 3 Encounters:   03/15/22 134/64   03/09/22 144/66   03/07/22 164/69     Pulse Readings from Last 3 Encounters:   03/15/22 71   03/09/22 89   03/07/22 76      General:  Obese, no acute distress at this time  Skin:  No acute rash  Eyes:  No scleral icterus and noninjected  ENT:   mucous membranes moist  Neck:  no carotid bruits  Chest:  Clear to auscultation percussion, good respiratory effort, no use of accessory respiratory muscles  CVS:  Regular rate and rhythm without a murmur rub , unable to assess JVD due to body habitus  Abdomen:  Obese, soft and nontender   Extremities:  Trace lower extremity edema  Neuro:  No gross focality  Psych:  Alert, cooperative    Medications:    Current Facility-Administered Medications:     albumin human (FLEXBUMIN) 5 % injection 12 5 g, 12 5 g, Intravenous, BID (diuretic), Tia Hermleigh, CRNP, 12 5 g at 03/15/22 0934    albuterol (PROVENTIL HFA,VENTOLIN HFA) inhaler 2 puff, 2 puff, Inhalation, Q6H PRN, Patti White MD    aluminum-magnesium hydroxide-simethicone (MYLANTA) oral suspension 30 mL, 30 mL, Oral, Q6H PRN, Patti White MD    amLODIPine (NORVASC) tablet 5 mg, 5 mg, Oral, Daily, Patti White MD, 5 mg at 03/15/22 0936    ammonium lactate (LAC-HYDRIN) 12 % lotion, , Topical, BID, Patti White MD, Given at 03/15/22 7991    apixaban (ELIQUIS) tablet 5 mg, 5 mg, Oral, BID, Patti White MD, 5 mg at 03/15/22 0935    furosemide (LASIX) injection 60 mg, 60 mg, Intravenous, BID (diuretic), Maicel Gum, CRNP, 60 mg at 03/15/22 0935    insulin glargine (LANTUS) subcutaneous injection 20 Units 0 2 mL, 20 Units, Subcutaneous, HS, Patti White MD, 20 Units at 03/14/22 2105    insulin lispro (HumaLOG) 100 units/mL subcutaneous injection 1-5 Units, 1-5 Units, Subcutaneous, TID AC, 1 Units at 03/15/22 0936 **AND** Fingerstick Glucose (POCT), , , TID AC, Patti White MD    insulin lispro (HumaLOG) 100 units/mL subcutaneous injection 1-5 Units, 1-5 Units, Subcutaneous, HS, Patti White MD, 1 Units at 03/14/22 2104    lidocaine (LIDODERM) 5 % patch 2 patch, 2 patch, Topical, Daily, Patti White MD, 2 patch at 03/15/22 0934    loratadine (CLARITIN) tablet 10 mg, 10 mg, Oral, Daily, Patti White MD, 10 mg at 03/15/22 0935    magnesium hydroxide (MILK OF MAGNESIA) oral suspension 30 mL, 30 mL, Oral, Daily PRN, Patti White MD    metoprolol (LOPRESSOR) injection 5 mg, 5 mg, Intravenous, Q6H PRN, Patti White MD    metoprolol tartrate (LOPRESSOR) tablet 50 mg, 50 mg, Oral, Q12H [facility], Patti White MD, 50 mg at 03/15/22 0936    montelukast (SINGULAIR) tablet 10 mg, 10 mg, Oral, HS, Patti White MD, 10 mg at 03/14/22 2103    nitroglycerin (NITROSTAT) SL tablet 0 4 mg, 0 4 mg, Sublingual, Q5 Min PRN, Patti White MD    nystatin (MYCOSTATIN) powder, , Topical, BID, Patti White MD, Given at 03/15/22 0113   ondansetron [facility]) injection 4 mg, 4 mg, Intravenous, Q6H PRN, Samantha Ochoa MD, 4 mg at 03/14/22 2128    pravastatin (PRAVACHOL) tablet 80 mg, 80 mg, Oral, Daily With Guillermo Roche MD, 80 mg at 03/14/22 1709    pregabalin (LYRICA) capsule 100 mg, 100 mg, Oral, BID, Samantha Ochoa MD, 100 mg at 03/15/22 0935    traMADol (ULTRAM) tablet 50 mg, 50 mg, Oral, Q6H PRN, Mikey Rm PA-C, 50 mg at 03/14/22 2128    Laboratory Results:  Results from last 7 days   Lab Units 03/15/22  0503 03/14/22  0515 03/13/22  0445 03/12/22  0426 03/10/22  0513 03/09/22  1830 03/08/22  1736   WBC Thousand/uL 7 46 7 82 8 23  --  6 19 6 19 6 02   HEMOGLOBIN g/dL 8 2* 8 8* 8 7*  --  8 9* 9 2* 8 8*   HEMATOCRIT % 28 8* 29 6* 29 7*  --  30 9* 31 7* 29 9*   PLATELETS Thousands/uL 214 223 253  --  300 356 331   SODIUM mmol/L 144 143 143 144 146* 146* 145   POTASSIUM mmol/L 4 8 4 5 4 5 4 3 4 2 4 6 4 6   CHLORIDE mmol/L 104 105 106 106 107 108 107   CO2 mmol/L 38* 38* 33* 34* 32 31 27   BUN mg/dL 40* 34* 33* 32* 30* 32* 37*   CREATININE mg/dL 2 16* 1 89* 1 85* 1 78* 1 24 1 14 1 24   CALCIUM mg/dL 7 6* 7 6* 7 6* 7 6* 8 2* 8 4 8 2*   MAGNESIUM mg/dL  --   --   --   --  2 1 2 3 2 5   PHOSPHORUS mg/dL  --   --   --   --  3 8  --   --        XR chest portable   Final Result by Chase Arvizu MD (03/13 1304)      Pulmonary vascular congestion is similar to prior study  Workstation performed: [id number]         VAS upper limb venous duplex scan, unilateral/limited   Final Result by Milan Rodriguez MD (03/10 1823)      XR chest portable   Final Result by Bj Villalobos MD (03/10 2503)      Increased pulmonary vascular congestion and development of small bilateral pleural effusions  Workstation performed: [id number]         CTA ED chest PE Study   Final Result by Bill Cronin MD (03/09 2032)         1  Small bilateral pleural effusions and bibasilar atelectasis    Few airspace opacities in the right lung could represent mild aspiration  2   No evidence of acute pulmonary embolus, thoracic aortic aneurysm or dissection  Workstation performed: [id number]         XR chest 1 view portable   Final Result by Jules Morley MD (03/10 9432)      Hypoventilation with subsegmental atelectasis at the bases  Developing edema or inflammatory infiltrates are difficult to exclude  Please see subsequent CT for further report                  Workstation performed: [id number]             Portions of the record may have been created with voice recognition software  Occasional wrong word or "sound a like" substitutions may have occurred due to the inherent limitations of voice recognition software  Read the chart carefully and recognize, using context, where substitutions have occurred

## 2022-03-15 NOTE — ASSESSMENT & PLAN NOTE
Wt Readings from Last 3 Encounters:   03/15/22 128 kg (281 lb 4 9 oz)   03/08/22 135 kg (297 lb 9 9 oz)   03/03/22 127 kg (279 lb 15 8 oz)     · SOB POA, secondary to acute on chronic diastolic CHF  BNP 5992  CT shows small b/l plerual effusions   · Recent shoulder surgery, SEBASTIAN, not on diuretics at SNF (previously on torsemide 10mg/d)  · ECHO performed 3/10 shows EF 53%, systolic function low normal, borderline concentric hypertrophy as per Cardiology report  · Procalcitonin negative - would hold off abx for aspiration related air space opacities seen on CT    · CTA chest negative for pulmonary embolism   · Patient had developed some SOB on 3/13; repeat chest xray continues to show pulmonary congestion  · Patient is on IV Lasix 60 milligram q 12 hours along with albumin  · Patient to be transitioned to torsemide 20 milligrams tomorrow  · Patient currently requiring 2L NC O2, will need desaturation screening prior to discharge  · Continue metoprolol 50 mg q 12 hours  · Continue intake and output, daily weights

## 2022-03-16 LAB
ANION GAP SERPL CALCULATED.3IONS-SCNC: 6 MMOL/L (ref 4–13)
BUN SERPL-MCNC: 53 MG/DL (ref 5–25)
CALCIUM SERPL-MCNC: 7.6 MG/DL (ref 8.3–10.1)
CHLORIDE SERPL-SCNC: 102 MMOL/L (ref 100–108)
CO2 SERPL-SCNC: 37 MMOL/L (ref 21–32)
CREAT SERPL-MCNC: 2.44 MG/DL (ref 0.6–1.3)
GFR SERPL CREATININE-BSD FRML MDRD: 19 ML/MIN/1.73SQ M
GLUCOSE SERPL-MCNC: 175 MG/DL (ref 65–140)
GLUCOSE SERPL-MCNC: 188 MG/DL (ref 65–140)
GLUCOSE SERPL-MCNC: 203 MG/DL (ref 65–140)
GLUCOSE SERPL-MCNC: 255 MG/DL (ref 65–140)
GLUCOSE SERPL-MCNC: 331 MG/DL (ref 65–140)
POTASSIUM SERPL-SCNC: 5.3 MMOL/L (ref 3.5–5.3)
SODIUM SERPL-SCNC: 145 MMOL/L (ref 136–145)

## 2022-03-16 PROCEDURE — 97116 GAIT TRAINING THERAPY: CPT

## 2022-03-16 PROCEDURE — 80048 BASIC METABOLIC PNL TOTAL CA: CPT | Performed by: INTERNAL MEDICINE

## 2022-03-16 PROCEDURE — 86334 IMMUNOFIX E-PHORESIS SERUM: CPT | Performed by: PATHOLOGY

## 2022-03-16 PROCEDURE — 97530 THERAPEUTIC ACTIVITIES: CPT

## 2022-03-16 PROCEDURE — 82948 REAGENT STRIP/BLOOD GLUCOSE: CPT

## 2022-03-16 PROCEDURE — 84165 PROTEIN E-PHORESIS SERUM: CPT | Performed by: PATHOLOGY

## 2022-03-16 PROCEDURE — 99232 SBSQ HOSP IP/OBS MODERATE 35: CPT | Performed by: INTERNAL MEDICINE

## 2022-03-16 PROCEDURE — 99232 SBSQ HOSP IP/OBS MODERATE 35: CPT | Performed by: STUDENT IN AN ORGANIZED HEALTH CARE EDUCATION/TRAINING PROGRAM

## 2022-03-16 PROCEDURE — 86334 IMMUNOFIX E-PHORESIS SERUM: CPT | Performed by: STUDENT IN AN ORGANIZED HEALTH CARE EDUCATION/TRAINING PROGRAM

## 2022-03-16 PROCEDURE — 84165 PROTEIN E-PHORESIS SERUM: CPT | Performed by: STUDENT IN AN ORGANIZED HEALTH CARE EDUCATION/TRAINING PROGRAM

## 2022-03-16 PROCEDURE — 97535 SELF CARE MNGMENT TRAINING: CPT

## 2022-03-16 RX ADMIN — INSULIN LISPRO 2 UNITS: 100 INJECTION, SOLUTION INTRAVENOUS; SUBCUTANEOUS at 21:09

## 2022-03-16 RX ADMIN — INSULIN LISPRO 1 UNITS: 100 INJECTION, SOLUTION INTRAVENOUS; SUBCUTANEOUS at 15:55

## 2022-03-16 RX ADMIN — LORATADINE 10 MG: 10 TABLET ORAL at 08:50

## 2022-03-16 RX ADMIN — METOPROLOL TARTRATE 50 MG: 50 TABLET, FILM COATED ORAL at 21:09

## 2022-03-16 RX ADMIN — DOCUSATE SODIUM 100 MG: 100 CAPSULE ORAL at 17:20

## 2022-03-16 RX ADMIN — AMLODIPINE BESYLATE 5 MG: 5 TABLET ORAL at 08:50

## 2022-03-16 RX ADMIN — NYSTATIN: 100000 POWDER TOPICAL at 08:52

## 2022-03-16 RX ADMIN — INSULIN LISPRO 1 UNITS: 100 INJECTION, SOLUTION INTRAVENOUS; SUBCUTANEOUS at 08:51

## 2022-03-16 RX ADMIN — Medication: at 17:20

## 2022-03-16 RX ADMIN — APIXABAN 5 MG: 5 TABLET, FILM COATED ORAL at 08:50

## 2022-03-16 RX ADMIN — NYSTATIN: 100000 POWDER TOPICAL at 17:20

## 2022-03-16 RX ADMIN — Medication: at 08:52

## 2022-03-16 RX ADMIN — INSULIN GLARGINE 20 UNITS: 100 INJECTION, SOLUTION SUBCUTANEOUS at 21:07

## 2022-03-16 RX ADMIN — LIDOCAINE 5% 2 PATCH: 700 PATCH TOPICAL at 08:49

## 2022-03-16 RX ADMIN — DOCUSATE SODIUM 100 MG: 100 CAPSULE ORAL at 08:49

## 2022-03-16 RX ADMIN — PREGABALIN 100 MG: 100 CAPSULE ORAL at 08:50

## 2022-03-16 RX ADMIN — MONTELUKAST 10 MG: 10 TABLET, FILM COATED ORAL at 21:07

## 2022-03-16 RX ADMIN — PRAVASTATIN SODIUM 80 MG: 80 TABLET ORAL at 15:55

## 2022-03-16 RX ADMIN — METOPROLOL TARTRATE 50 MG: 50 TABLET, FILM COATED ORAL at 08:49

## 2022-03-16 RX ADMIN — PREGABALIN 100 MG: 100 CAPSULE ORAL at 17:20

## 2022-03-16 RX ADMIN — INSULIN LISPRO 4 UNITS: 100 INJECTION, SOLUTION INTRAVENOUS; SUBCUTANEOUS at 12:03

## 2022-03-16 RX ADMIN — APIXABAN 5 MG: 5 TABLET, FILM COATED ORAL at 17:20

## 2022-03-16 RX ADMIN — TORSEMIDE 20 MG: 20 TABLET ORAL at 08:50

## 2022-03-16 NOTE — PLAN OF CARE
Problem: OCCUPATIONAL THERAPY ADULT  Goal: Performs self-care activities at highest level of function for planned discharge setting  See evaluation for individualized goals  Description: Treatment Interventions: ADL retraining,Functional transfer training,UE strengthening/ROM,Endurance training,Patient/family training,Equipment evaluation/education,Compensatory technique education,Continued evaluation,Energy conservation,Activityengagement          See flowsheet documentation for full assessment, interventions and recommendations  Outcome: Progressing  Note: Limitation: Decreased ADL status,Decreased UE ROM,Decreased UE strength,Decreased Safe judgement during ADL,Decreased endurance,Decreased self-care trans,Decreased high-level ADLs  Prognosis: Good  Assessment: Patient seen for OT treatment  Patient is generally weak and deconditioned  Patient walked in hallway  Patient is fatigued with activity  Patient required min assist for bed mobility and transfers today  Patients sling adjusted for better fit  Patient will benefit from continued OT services to maximize functional performance with ADLS         OT Discharge Recommendation: Home with home health rehabilitation

## 2022-03-16 NOTE — PROGRESS NOTES
Progress Note - Cardiology   [facility]     Vanda De Jesus 73 y o  female MRN: [id number]  : 1950  Unit/Bed#: [address] [place] [address] Encounter: [id number]    Assessment and Plan:   1  Acute on chronic diastolic heart failure:  Noted patient was not discharged on her home dose of torsemide    -  patient diuretics were escalated to Lasix 60 mg b i d  IV over the weekend, now on Demadex 20 mg per Nephrology    -  low-sodium diet    -  I&O, daily weights and monitor labs     2  Acute kidney injury on chronic kidney disease:  Previous baseline creatinine was 1 4 to 1 6 in 2019     Creatinine today is 2 44    -  followed by Nephrology    -  may need to readjust patient's baseline creatinine to keep her euvolemic      3  Superficial thrombophlebitis of left cephalic vein:  Eliquis resumed     4  Paroxysmal atrial fibrillation:  Currently sinus rhythm    -  Eliquis 5 mg b i d  Was resumed during this admission    -  continue Lopressor 50 mg q 12 hours      5  Hypertension:  Blood pressure stable on Lopressor 50 mg b i d  And Norvasc 5 mg daily    -  continue to monitor     6  Postop Anemia:  Hemoglobin stable     7  Left total shoulder replacement:  2022     Subjective / Objective:   Patient seen and examined  No cardiac complaints offered today  Diuretics per Nephrology    Vitals: Blood pressure 136/59, pulse 67, temperature (!) 97 2 °F (36 2 °C), resp  rate 16, height 5' 2" (1 575 m), weight 129 kg (284 lb 2 8 oz), SpO2 96 %, not currently breastfeeding  Vitals:    03/15/22 0600 22 0600   Weight: 128 kg (281 lb 4 9 oz) 129 kg (284 lb 2 8 oz)     Body mass index is 51 98 kg/m²    BP Readings from Last 3 Encounters:   22 136/59   22 144/66   22 164/69     Orthostatic Blood Pressures      Most Recent Value   Blood Pressure 136/59 filed at 2022 0736   Patient Position - Orthostatic VS Lying filed at 2022 2322        I/O        0701  03/15 0700 03/15 0701   0700  0701   0700    P  O  Total Intake(mL/kg)       Urine (mL/kg/hr) 1600 (0 5) 400 (0 1)     Total Output 1600 400     Net -1600 -400                Invasive Devices  Report    Peripheral Intravenous Line            Peripheral IV 22 Right Antecubital 2 days          Drain            External Urinary Catheter 2 days                  Intake/Output Summary (Last 24 hours) at 3/16/2022 1121  Last data filed at 3/16/2022 6662  Gross per 24 hour   Intake --   Output 400 ml   Net -400 ml         Physical Exam:   Physical Exam  Vitals and nursing note reviewed  Constitutional:       Appearance: Normal appearance  She is well-developed  She is morbidly obese  HENT:      Right Ear: External ear normal       Left Ear: External ear normal       Nose: Nose normal    Eyes:      General: No scleral icterus  Right eye: No discharge  Left eye: No discharge  Pupils: Pupils are equal, round, and reactive to light  Neck:      Thyroid: No thyromegaly  Cardiovascular:      Rate and Rhythm: Normal rate and regular rhythm  Pulses: Normal pulses  Pulmonary:      Effort: Pulmonary effort is normal  No accessory muscle usage or respiratory distress  Breath sounds: Examination of the right-lower field reveals decreased breath sounds  Examination of the left-lower field reveals decreased breath sounds  Decreased breath sounds present  Abdominal:      General: Bowel sounds are normal       Palpations: Abdomen is soft  Musculoskeletal:      Cervical back: Normal range of motion and neck supple  Right lower le+ Pitting Edema present  Left lower le+ Pitting Edema present  Comments: Component of chronic lymphedema   Skin:     General: Skin is warm and dry  Capillary Refill: Capillary refill takes less than 2 seconds  Neurological:      General: No focal deficit present  Mental Status: She is alert and oriented to person, place, and time  Mental status is at baseline  Psychiatric:         Mood and Affect: Mood normal          Behavior: Behavior is cooperative                     Medications/ Allergies:     Current Facility-Administered Medications   Medication Dose Route Frequency Provider Last Rate    albuterol  2 puff Inhalation Q6H PRN Cee Ladd MD      aluminum-magnesium hydroxide-simethicone  30 mL Oral Q6H PRN Cee GivenMD abigail      amLODIPine  5 mg Oral Daily Pearle GivenMD abigail      ammonium lactate   Topical BID Pearle Givens, MD      apixaban  5 mg Oral BID Pearle Givens, MD      docusate sodium  100 mg Oral BID Ghazal Osman MD      insulin glargine  20 Units Subcutaneous HS Pearle Givens, MD      insulin lispro  1-5 Units Subcutaneous TID [facility] Cee GivenMD abigail      insulin lispro  1-5 Units Subcutaneous HS Pearle Givens, MD      lidocaine  2 patch Topical Daily Pearle Givens, MD      loratadine  10 mg Oral Daily Pearle Givens, MD      magnesium hydroxide  30 mL Oral Daily PRN Carlosle GivenMD abigail      metoprolol  5 mg Intravenous Q6H PRN Pearle GivenMD abigail      metoprolol tartrate  50 mg Oral Q12H Ricardo Toney MD      montelukast  10 mg Oral HS Pearle Givens, MD      nitroglycerin  0 4 mg Sublingual Q5 Min PRN Pearle Givens, MD      nystatin   Topical BID Pearle Givens, MD      ondansetron  4 mg Intravenous Q6H PRN Pearle Givens, MD      pravastatin  80 mg Oral Daily With Consuelo Beaver MD      pregabalin  100 mg Oral BID Pearle Givens, MD      torsemide  20 mg Oral Daily Merry Rincon MD      traMADol  50 mg Oral Q6H PRN Mikey Rm PA-C       albuterol, 2 puff, Q6H PRN  aluminum-magnesium hydroxide-simethicone, 30 mL, Q6H PRN  magnesium hydroxide, 30 mL, Daily PRN  metoprolol, 5 mg, Q6H PRN  nitroglycerin, 0 4 mg, Q5 Min PRN  ondansetron, 4 mg, Q6H PRN  traMADol, 50 mg, Q6H PRN      Allergies   Allergen Reactions    Penicillins Hives    Moxifloxacin Other (See Comments)     unknown    Percocet [Oxycodone-Acetaminophen] Other (See Comments)     unknown    Zinc Acetate Other (See Comments)     unknown    Nuts - Food Allergy Other (See Comments)    Asa [Aspirin] GI Intolerance    Indocin [Indomethacin] Other (See Comments)     Made patient "loopy"    Other Other (See Comments)     unknown       VTE Pharmacologic Prophylaxis:   Sequential compression device (Venodyne)     Labs:   Troponins:  Results from last 7 days   Lab Units 03/09/22  2228 03/09/22  2040   HSTNI D2 ng/L  --  0   HSTNI D4 ng/L 0  --      CBC with diff:  Results from last 7 days   Lab Units 03/15/22  0503 03/14/22  0515 03/13/22  0445 03/10/22  0513 03/09/22  1830   WBC Thousand/uL 7 46 7 82 8 23 6 19 6 19   HEMOGLOBIN g/dL 8 2* 8 8* 8 7* 8 9* 9 2*   HEMATOCRIT % 28 8* 29 6* 29 7* 30 9* 31 7*   MCV fL 101* 98 97 99* 96   PLATELETS Thousands/uL 214 223 253 300 356   MCH pg 28 8 29 1 28 4 28 4 28 0   MCHC g/dL 28 5* 29 7* 29 3* 28 8* 29 0*   RDW % 15 8* 15 5* 15 5* 15 7* 15 5*   MPV fL 10 2 10 2 10 1 9 9 10 0   NRBC AUTO /100 WBCs 0  --   --  0 0     CMP:  Results from last 7 days   Lab Units 03/16/22  0616 03/15/22  0503 03/14/22  0515 03/13/22  0445 03/12/22  0426 03/10/22  0513 03/09/22  1830   SODIUM mmol/L 145 144 143 143 144 146* 146*   POTASSIUM mmol/L 5 3 4 8 4 5 4 5 4 3 4 2 4 6   CHLORIDE mmol/L 102 104 105 106 106 107 108   CO2 mmol/L 37* 38* 38* 33* 34* 32 31   ANION GAP mmol/L 6 2* 0* 4 4 7 7   BUN mg/dL 53* 40* 34* 33* 32* 30* 32*   CREATININE mg/dL 2 44* 2 16* 1 89* 1 85* 1 78* 1 24 1 14   CALCIUM mg/dL 7 6* 7 6* 7 6* 7 6* 7 6* 8 2* 8 4   AST U/L  --  12 16  --   --  14 13   ALT U/L  --  14 20  --   --  18 17   ALK PHOS U/L  --  85 87  --   --  79 84   TOTAL PROTEIN g/dL  --  5 9* 5 9*  --   --  6 3* 6 2*   ALBUMIN g/dL  --  2 9* 2 6*  --   --  2 5* 2 4*   TOTAL BILIRUBIN mg/dL  --  0 23 0 29  --   --  0 32 0 41   EGFR ml/min/1 73sq m 19 22 26 27 28 44 49     Magnesium:  Results from last 7 days   Lab Units 03/10/22  0513 03/09/22  1830   MAGNESIUM mg/dL 2 1 2 3     Coags:  Results from last 7 days   Lab Units 03/10/22  0513 03/09/22  1830   PTT seconds 40* 37   INR  1 34* 1 22*     TSH:  Results from last 7 days   Lab Units 03/09/22  1830   TSH 3RD GENERATON uIU/mL 3 550     No components found for: TSH3  Lipid Profile:  Results from last 7 days   Lab Units 03/10/22  0513   CHOLESTEROL mg/dL 104   TRIGLYCERIDES mg/dL 132   HDL mg/dL 30*   LDL CALC mg/dL 48     Hgb A1c:  Results from last 7 days   Lab Units 03/09/22  1830   HEMOGLOBIN A1C % 6 8*       Imaging & Testing   I have personally reviewed pertinent reports  XR chest portable    Result Date: 3/13/2022  Narrative: CHEST INDICATION:   sob  COMPARISON:  Chest radiograph March 10, 2022  Correlation with chest CT March 9, 2022 EXAM PERFORMED/VIEWS:  XR CHEST PORTABLE FINDINGS: Cardiomediastinal silhouette appears unremarkable  Persistent mild prominence of the interstitial markings  Limited assessment for pleural effusions; no large pleural effusion  No pneumothorax  Partially imaged left shoulder reverse arthroplasty  Impression: Pulmonary vascular congestion is similar to prior study  Workstation performed: [id number]     XR chest portable    Result Date: 3/10/2022  Narrative: CHEST INDICATION:   sob  fu on status of fluid overload  COMPARISON:  3/9/2022  EXAM PERFORMED/VIEWS:  XR CHEST PORTABLE Images:  1 FINDINGS: Cardiac and mediastinal contours are stable and within normal limits  The aorta is calcified  There is increased pulmonary vascular congestion and development of small bilateral pleural effusions  No pneumothorax or focal airspace consolidation  Left shoulder reverse total arthroplasty partially seen  Impression: Increased pulmonary vascular congestion and development of small bilateral pleural effusions  Workstation performed: [id number]     XR chest 1 view portable    Result Date: 3/10/2022  Narrative: CHEST INDICATION:   shortness of breath  COMPARISON:  3/8/2022 ; 7/31/2019 EXAM PERFORMED/VIEWS:  XR CHEST PORTABLE FINDINGS:  Mild hypoventilation is present  Cardiomediastinal silhouette appears unremarkable  Subsegmental atelectasis at the bases is noted probably related to hypoventilation although some developing edema is difficult to exclude  No pneumothorax or pleural effusion  Left shoulder replacement is present  Impression: Hypoventilation with subsegmental atelectasis at the bases  Developing edema or inflammatory infiltrates are difficult to exclude  Please see subsequent CT for further report Workstation performed: [id number]     XR chest 1 view portable    Result Date: 3/9/2022  Narrative: CHEST INDICATION:   chest pain  COMPARISON:  7/31/2019 EXAM PERFORMED/VIEWS:  XR CHEST PORTABLE Single view FINDINGS: Cardiomediastinal silhouette appears unremarkable  The lungs are clear  No pneumothorax or pleural effusion  Reverse left shoulder arthroplasty has been performed since the prior study     Impression: No acute cardiopulmonary disease  Findings are stable except for left shoulder arthroplasty Workstation performed: [id number]       XR shoulder left 1 view    Result Date: 3/3/2022  Narrative: LEFT SHOULDER INDICATION:   post op  COMPARISON:  2/23/2022 VIEWS:  XR SHOULDER 1 VW LEFT FINDINGS: There is a reverse left shoulder arthroplasty  Components are in anatomic alignment  There are expected postoperative changes  Impression: Anatomic alignment of left shoulder reverse arthroplasty  Workstation performed: [id number]       VAS upper limb venous duplex scan, unilateral/limited    Result Date: 3/10/2022  Narrative:  [facility] REPORT CLINICAL: Indications:  Patient presents with left upper extremity edema x  several days  S/P left shoulder surgery 3/1/2022  Operative History: Patient denies any cardiovascular surgeries   Risk Factors The patient has history of HTN, Diabetes (Yes), Hyperlipidemia, CKD and previous smoking (quit >10yrs ago)  FINDINGS:  Left          Thrombus  Ceph Mid Arm  Acute        CONCLUSION: Impression  RIGHT UPPER LIMB LIMITED: Evaluation shows no evidence of thrombus in the internal jugular vein, subclavian vein, and the brachiocephalic vein  LEFT UPPER LIMB: Evidence of superficial thrombophlebitis noted in the cephalic vein  No evidence of acute or chronic deep vein thrombosis  Doppler evaluation shows a normal response to augmentation maneuvers  Technically difficult/limited study due to immobile shoulder, pitting edema and poor visualization of deep system  SIGNATURE: Electronically Signed by: Jeanelle Lefort, MD on 2022-03-10 06:23:16 PM    US kidney and bladder    Result Date: 3/15/2022  Narrative: RENAL ULTRASOUND INDICATION:   SEBASTIAN  COMPARISON: 7/31/2019 TECHNIQUE:   Ultrasound of the retroperitoneum was performed with a curvilinear transducer utilizing volumetric sweeps and still imaging techniques  FINDINGS: KIDNEYS: Symmetric and normal size  Right kidney:  10 7 x 5 0 x 6 2 cm  Volume 176 6 mL Left kidney:  11 2 x 6 4 x 5 5 cm  Volume 206 5 mL Right kidney Normal echogenicity and contour  No mass is identified  No hydronephrosis  No shadowing calculi  No perinephric fluid collections  Left kidney Normal echogenicity and contour  No mass is identified  No hydronephrosis  No shadowing calculi  No perinephric fluid collections  URETERS: Nonvisualized  BLADDER: The bladder is underdistended and suboptimally evaluated  Impression: No hydronephrosis  Workstation performed: [id number]     CTA ED chest PE Study    Result Date: 3/9/2022  Narrative: CTA - CHEST WITH IV CONTRAST - PULMONARY ANGIOGRAM INDICATION:   Shortness of breath  COMPARISON: 3/9/2022  TECHNIQUE: CTA examination of the chest was performed using angiographic technique according to a protocol specifically tailored to evaluate for pulmonary embolism    Axial, sagittal, and coronal 2D reformatted images were created from the source data and  submitted for interpretation  In addition, coronal 3D MIP postprocessing was performed on the acquisition scanner  Radiation dose length product (DLP) for this visit:  684 94 mGy-cm   This examination, like all CT scans performed in the [facility], was performed utilizing techniques to minimize radiation dose exposure, including the use of iterative  reconstruction and automated exposure control  IV Contrast:  85 mL of iohexol (OMNIPAQUE)  FINDINGS: PULMONARY ARTERIAL TREE:  No filling defect in the visualized pulmonary arteries to suggest acute embolus  LUNGS:  Bibasilar atelectasis  Few small airspace opacities scattered throughout the right lung could represent mild aspiration  No pulmonary interstitial edema  There is no tracheal or endobronchial lesion  PLEURA:  Small bilateral pleural effusions  HEART/GREAT VESSELS: Mild cardiomegaly  Aberrant right subclavian artery  No thoracic aortic aneurysm or dissection  MEDIASTINUM AND LUIGI:  No lymphadenopathy  CHEST WALL AND LOWER NECK:   Unremarkable  VISUALIZED STRUCTURES IN THE UPPER ABDOMEN:  Unremarkable  OSSEOUS STRUCTURES:  No acute fracture or destructive osseous lesion  Impression: 1  Small bilateral pleural effusions and bibasilar atelectasis  Few airspace opacities in the right lung could represent mild aspiration  2   No evidence of acute pulmonary embolus, thoracic aortic aneurysm or dissection  Workstation performed: [id number]       Echo complete w/ contrast if indicated    Result Date: 3/11/2022  Narrative: Ardeth Needs  Left Ventricle: Left ventricular cavity size is normal  Wall thickness is mildly increased  The left ventricular ejection fraction is 53% by biplane measurement  Systolic function is low normal  Wall motion is normal  There is borderline concentric hypertrophy    Right Ventricle: Systolic function is low normal  Normal tricuspid annular plane systolic excursion (TAPSE) > 1 7 cm    Study Details:  This transthoracic echocardiogram was performed at bedside  This was a routine, inpatient study  Study quality was poor  This was a technically difficult study due to decreased penetration, restricted mobility and poor acoustic windows  A complete 2D, color flow Doppler, spectral Doppler, 2D, color flow Doppler and spectral Doppler transthoracic echocardiogram was performed  The apical, parasternal, subcostal and suprasternal views were obtained  0 6 ml/min of Definity ultrasound enhancing agent was used due to opacify the left ventricle  Leane Pacer  Prior TTE study available for comparison  Prior study date: 5/29/2019  No significant changes noted compared to the prior study  Dee Ashley   Cardiology      "This note has been constructed using a voice recognition system  Therefore there may be syntax, spelling, and/or grammatical errors   Please call if you have any questions  "

## 2022-03-16 NOTE — PLAN OF CARE
Problem: Nutrition/Hydration-ADULT  Goal: Nutrient/Hydration intake appropriate for improving, restoring or maintaining nutritional needs  Description: Monitor and assess patient's nutrition/hydration status for malnutrition  Collaborate with interdisciplinary team and initiate plan and interventions as ordered  Monitor patient's weight and dietary intake as ordered or per policy  Utilize nutrition screening tool and intervene as necessary  Determine patient's food preferences and provide high-protein, high-caloric foods as appropriate       INTERVENTIONS:  - Monitor oral intake, urinary output, labs, and treatment plans  - Assess nutrition and hydration status and recommend course of action  - Evaluate amount of meals eaten  - Assist patient with eating if necessary   - Allow adequate time for meals  - Recommend/ encourage appropriate diets, oral nutritional supplements, and vitamin/mineral supplements  - Order, calculate, and assess calorie counts as needed  - Recommend, monitor, and adjust tube feedings and TPN/PPN based on assessed needs  - Assess need for intravenous fluids  - Provide specific nutrition/hydration education as appropriate  - Include patient/family/caregiver in decisions related to nutrition  Outcome: Progressing     Problem: Prexisting or High Potential for Compromised Skin Integrity  Goal: Skin integrity is maintained or improved  Description: INTERVENTIONS:  - Identify patients at risk for skin breakdown  - Assess and monitor skin integrity  - Assess and monitor nutrition and hydration status  - Monitor labs   - Turn and reposition patient  - Assist with mobility/ambulation  - Relieve pressure over bony prominences  - Avoid friction and shearing  - Provide appropriate hygiene as needed including keeping skin clean and dry  - Evaluate need for skin moisturizer/barrier cream  - Collaborate with interdisciplinary team   - Patient/family teaching    Outcome: Progressing     Problem: Potential for Falls  Goal: Patient will remain free of falls  Description: INTERVENTIONS:  - Educate patient/family on patient safety including physical limitations  - Instruct patient to call for assistance with activity   - Consult OT/PT to assist with strengthening/mobility   - Keep Call bell within reach  - Keep bed low and locked with side rails adjusted as appropriate  - Keep care items and personal belongings within reach  - Initiate and maintain comfort rounds  - Make Fall Risk Sign visible to staff    - Initiate/Maintain alarm  - Obtain necessary fall risk management equipment:   - Apply yellow socks and bracelet for high fall risk patients  - Consider moving patient to room near nurses station  Outcome: Progressing     Problem: MOBILITY - ADULT  Goal: Maintain or return to baseline ADL function  Description: INTERVENTIONS:  - Educate patient/family on patient safety including physical limitations  - Instruct patient to call for assistance with activity   - Consult OT/PT to assist with strengthening/mobility   - Keep Call bell within reach  - Keep bed low and locked with side rails adjusted as appropriate  - Keep care items and personal belongings within reach  - Initiate and maintain comfort rounds  - Make Fall Risk Sign visible to staff    - Apply yellow socks and bracelet for high fall risk patients  - Consider moving patient to room near nurses station  Outcome: Progressing     Problem: PAIN - ADULT  Goal: Verbalizes/displays adequate comfort level or baseline comfort level  Description: Interventions:  - Encourage patient to monitor pain and request assistance  - Assess pain using appropriate pain scale  - Administer analgesics based on type and severity of pain and evaluate response  - Implement non-pharmacological measures as appropriate and evaluate response  - Consider cultural and social influences on pain and pain management  - Notify physician/advanced practitioner if interventions unsuccessful or patient reports new pain  Outcome: Progressing     Problem: INFECTION - ADULT  Goal: Absence or prevention of progression during hospitalization  Description: INTERVENTIONS:  - Assess and monitor for signs and symptoms of infection  - Monitor lab/diagnostic results  - Monitor all insertion sites, i e  indwelling lines, tubes, and drains  - Monitor endotracheal if appropriate and nasal secretions for changes in amount and color  - [place] appropriate cooling/warming therapies per order  - Administer medications as ordered  - Instruct and encourage patient and family to use good hand hygiene technique  - Identify and instruct in appropriate isolation precautions for identified infection/condition  Outcome: Progressing     Problem: SAFETY ADULT  Goal: Patient will remain free of falls  Description: INTERVENTIONS:  - Educate patient/family on patient safety including physical limitations  - Instruct patient to call for assistance with activity   - Consult OT/PT to assist with strengthening/mobility   - Keep Call bell within reach  - Keep bed low and locked with side rails adjusted as appropriate  - Keep care items and personal belongings within reach  - Initiate and maintain comfort rounds  - Make Fall Risk Sign visible to staff    - Initiate/Maintain alarm  - Obtain necessary fall risk management equipment:   - Apply yellow socks and bracelet for high fall risk patients  - Consider moving patient to room near nurses station  Outcome: Progressing     Problem: RESPIRATORY - ADULT  Goal: Achieves optimal ventilation and oxygenation  Description: INTERVENTIONS:  - Assess for changes in respiratory status  - Assess for changes in mentation and behavior  - Position to facilitate oxygenation and minimize respiratory effort  - Oxygen administered by appropriate delivery if ordered  - Initiate smoking cessation education as indicated  - Encourage broncho-pulmonary hygiene including cough, deep breathe, Incentive Spirometry  - Assess the need for suctioning and aspirate as needed  - Assess and instruct to report SOB or any respiratory difficulty  - Respiratory Therapy support as indicated  Outcome: Progressing     Problem: CARDIOVASCULAR - ADULT  Goal: Maintains optimal cardiac output and hemodynamic stability  Description: INTERVENTIONS:  - Monitor I/O, vital signs and rhythm  - Monitor for S/S and trends of decreased cardiac output  - Assess quality of pulses, skin color and temperature  - Instruct patient to report change in severity of symptoms  Outcome: Progressing     Problem: SAFETY ADULT  Goal: Patient will remain free of falls  Description: INTERVENTIONS:  - Educate patient/family on patient safety including physical limitations  - Instruct patient to call for assistance with activity   - Consult OT/PT to assist with strengthening/mobility   - Keep Call bell within reach  - Keep bed low and locked with side rails adjusted as appropriate  - Keep care items and personal belongings within reach  - Initiate and maintain comfort rounds  - Make Fall Risk Sign visible to staff    - Initiate/Maintain alarm  - Obtain necessary fall risk management equipment:   - Apply yellow socks and bracelet for high fall risk patients  - Consider moving patient to room near nurses station  Outcome: Progressing

## 2022-03-16 NOTE — ASSESSMENT & PLAN NOTE
Wt Readings from Last 3 Encounters:   03/16/22 129 kg (284 lb 2 8 oz)   03/08/22 135 kg (297 lb 9 9 oz)   03/03/22 127 kg (279 lb 15 8 oz)     · SOB POA, secondary to acute on chronic diastolic CHF  BNP 5992  CT shows small b/l plerual effusions   · Recent shoulder surgery, SEBASTIAN, not on diuretics at SNF (previously on torsemide 10mg/d)  · ECHO performed 3/10 shows EF 35%, systolic function low normal, borderline concentric hypertrophy as per Cardiology report  · Procalcitonin negative - would hold off abx for aspiration related air space opacities seen on CT    · CTA chest negative for pulmonary embolism   · Patient had developed some SOB on 3/13; repeat chest xray continues to show pulmonary congestion  · Patient is on IV Lasix 60 milligram q 12 hours along with albumin  · Patient to be transitioned to torsemide 20 milligrams today  · Continue 2 liters oxygen  Patient arranged for home oxygen  · Continue metoprolol 50 mg q 12 hours  · Continue intake and output, daily weights

## 2022-03-16 NOTE — PROGRESS NOTES
NEPHROLOGY PROGRESS NOTE   Fabien Hutchins 73 y o  female MRN: [id number]  Unit/Bed#: [address] [place] [address] Encounter: [id number]  Reason for Consult: SEBASTIAN    ASSESSMENT AND PLAN:  73 y o  woman PMH of CKD G3b (creatinine were 1 4-1 6mg/dL) last time seen by Akosua Naidu in 2019, with multiple episodes of SEBASTIAN, DM, obesity, HTN, CHF, recent fall s/p left shoulder fracture, AFib on Eliquis p/w chest pain, elevated creatinine and fluid overload   Nephrology is consulted for SEBASTIAN in diuretic management        PLAN     #Non-Oliguric KDIGO SEBASTIAN stage 1 on CKD G3bA2     · Etiology:  Likely secondary to CRS settings of CHF exacerbation complicated with contrast induced nephropathy  · Had CT with contrast on 3/9  · Baseline creatinine:  1 4-1 6 mg/dL with multiple episodes of SEBASTIAN  · Current creatinine:  2 4 mg/dL, slowly trending up  · Peak creatinine:  Trending  · UA:  Leukocyturia, no micro hematuria  · Renal imaging :    no hydronephrosis  · Treatment:  · No urgent indication of dialysis at this time  · Maintain MAP:  Over 65 mmHg if possible/avoid hypoperfusion:  Hold parameters on blood pressure medications  · Avoid nephrotoxic agents such as NSAIDs, and IV contrast if possible   Avoid opioids   · Adjust medications to GFR     #CKD G3bA2  · Baseline creatinine:  1 4-1 6 mg/dL  · Etiology:  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        #Acid-base Disorder  · serum HCO3 37 millimoles per L  · Metabolic alkalosis likely secondary to vascular contraction in the settings of diuretics     #Volume status/hypertension:  · Volume:  Close to euvolemia, weight lost 5 kg, improving edema and shortness of breath  · Blood pressure: normotensive /59mmhg, goal< 140/90mmhg  · Recommend:  · Low-sodium diet  · On diuretics:  Losing weight and good urinary output  · S/p Lasix 60 mg IV  · Start torsemide 20 mg this morning  · Will accept higher levels of creatinine to achieve more appropriate fluid status  · Monitor urinary output, daily weight      #Anemia:  · Current hemoglobin:  8 2 mg/dL  · Treatment:  · Transfuse for hemoglobin less than 7 0 per primary service       # CHF exacerbation  · Diuretics as above  · Low-sodium and restriction  ·      SUBJECTIVE:  Patient seen and examined at bedside  Patient is feeling better, no shortness of breath, no chest pain      OBJECTIVE:  Current Weight: Weight - Scale: 129 kg (284 lb 2 8 oz)  Vitals:    03/16/22 0736   BP: 136/59   Pulse: 67   Resp: 16   Temp: (!) 97 2 °F (36 2 °C)   SpO2: 96%       Intake/Output Summary (Last 24 hours) at 3/16/2022 0844  Last data filed at 3/16/2022 0461  Gross per 24 hour   Intake --   Output 400 ml   Net -400 ml     Wt Readings from Last 3 Encounters:   03/16/22 129 kg (284 lb 2 8 oz)   03/08/22 135 kg (297 lb 9 9 oz)   03/03/22 127 kg (279 lb 15 8 oz)     Temp Readings from Last 3 Encounters:   03/16/22 (!) 97 2 °F (36 2 °C)   03/09/22 97 9 °F (36 6 °C) (Tympanic)   03/07/22 (!) 97 4 °F (36 3 °C) (Oral)     BP Readings from Last 3 Encounters:   03/16/22 136/59   03/09/22 144/66   03/07/22 164/69     Pulse Readings from Last 3 Encounters:   03/16/22 67   03/09/22 89   03/07/22 76        General:  Obese, no acute distress at this time  Skin:  No acute rash  Eyes:  No scleral icterus and noninjected  ENT:  mucous membranes moist  Neck: no carotid bruits  Chest:  Clear to auscultation percussion, good respiratory effort, no use of accessory respiratory muscles  CVS:  Regular rate and rhythm without a murmur rub , unable to assess JVD due to body habitus  Abdomen:  soft and nontender  Extremities:  Trace lower extremity edema  Neuro:  No gross focality  Psych:  Alert, cooperative      Medications:    Current Facility-Administered Medications:     albuterol (PROVENTIL HFA,VENTOLIN HFA) inhaler 2 puff, 2 puff, Inhalation, Q6H PRN, Izabel Paredes MD    aluminum-magnesium hydroxide-simethicone (MYLANTA) oral suspension 30 mL, 30 mL, Oral, Q6H PRN, Deirdre Perez MD    amLODIPine (NORVASC) tablet 5 mg, 5 mg, Oral, Daily, Deirdre Perez MD, 5 mg at 03/15/22 0936    ammonium lactate (LAC-HYDRIN) 12 % lotion, , Topical, BID, Deirdre Perez MD, Given at 03/15/22 1706    apixaban (ELIQUIS) tablet 5 mg, 5 mg, Oral, BID, Deirdre Perez MD, 5 mg at 03/15/22 1705    docusate sodium (COLACE) capsule 100 mg, 100 mg, Oral, BID, Johnathan Ohara MD, 100 mg at 03/15/22 1705    insulin glargine (LANTUS) subcutaneous injection 20 Units 0 2 mL, 20 Units, Subcutaneous, HS, Deirdre Perez MD, 20 Units at 03/15/22 2152    insulin lispro (HumaLOG) 100 units/mL subcutaneous injection 1-5 Units, 1-5 Units, Subcutaneous, TID AC, 1 Units at 03/15/22 1706 **AND** Fingerstick Glucose (POCT), , , TID AC, Deirdre Perez MD    insulin lispro (HumaLOG) 100 units/mL subcutaneous injection 1-5 Units, 1-5 Units, Subcutaneous, HS, Deirdre Perez MD, 1 Units at 03/15/22 2152    lidocaine (LIDODERM) 5 % patch 2 patch, 2 patch, Topical, Daily, Deirdre Perez MD, 2 patch at 03/15/22 0934    loratadine (CLARITIN) tablet 10 mg, 10 mg, Oral, Daily, Deirdre Perez MD, 10 mg at 03/15/22 0935    magnesium hydroxide (MILK OF MAGNESIA) oral suspension 30 mL, 30 mL, Oral, Daily PRN, Deirdre Perez MD    metoprolol (LOPRESSOR) injection 5 mg, 5 mg, Intravenous, Q6H PRN, Deirdre Perez MD    metoprolol tartrate (LOPRESSOR) tablet 50 mg, 50 mg, Oral, Q12H [address], Deirdre Perez MD, 50 mg at 03/15/22 2152    montelukast (SINGULAIR) tablet 10 mg, 10 mg, Oral, HS, Deirdre Perez MD, 10 mg at 03/15/22 2152    nitroglycerin (NITROSTAT) SL tablet 0 4 mg, 0 4 mg, Sublingual, Q5 Min PRN, Deirdre Perez MD    nystatin (MYCOSTATIN) powder, , Topical, BID, Deirdre Perez MD, Given at 03/15/22 1706    ondansetron (ZOFRAN) injection 4 mg, 4 mg, Intravenous, Q6H PRN, Deirdre Perez MD, 4 mg at 03/14/22 2128    pravastatin (PRAVACHOL) tablet 80 mg, 80 mg, Oral, Daily With Emmanuel Hall MD, 80 mg at 03/15/22 3162   pregabalin (LYRICA) capsule 100 mg, 100 mg, Oral, BID, Duncan Brian MD, 100 mg at 03/15/22 1705    torsemide (DEMADEX) tablet 20 mg, 20 mg, Oral, Daily, Jabier Turk MD    traMADol Alejandra Neve) tablet 50 mg, 50 mg, Oral, Q6H PRN, Mikey Rm PA-C, 50 mg at 03/14/22 2128    Laboratory Results:  Results from last 7 days   Lab Units 03/16/22  0616 03/15/22  0503 03/14/22  0515 03/13/22  0445 03/12/22  0426 03/10/22  0513 03/09/22  1830   WBC Thousand/uL  --  7 46 7 82 8 23  --  6 19 6 19   HEMOGLOBIN g/dL  --  8 2* 8 8* 8 7*  --  8 9* 9 2*   HEMATOCRIT %  --  28 8* 29 6* 29 7*  --  30 9* 31 7*   PLATELETS Thousands/uL  --  214 223 253  --  300 356   SODIUM mmol/L 145 144 143 143 144 146* 146*   POTASSIUM mmol/L 5 3 4 8 4 5 4 5 4 3 4 2 4 6   CHLORIDE mmol/L 102 104 105 106 106 107 108   CO2 mmol/L 37* 38* 38* 33* 34* 32 31   BUN mg/dL 53* 40* 34* 33* 32* 30* 32*   CREATININE mg/dL 2 44* 2 16* 1 89* 1 85* 1 78* 1 24 1 14   CALCIUM mg/dL 7 6* 7 6* 7 6* 7 6* 7 6* 8 2* 8 4   MAGNESIUM mg/dL  --   --   --   --   --  2 1 2 3   PHOSPHORUS mg/dL  --   --   --   --   --  3 8  --        US kidney and bladder   Final Result by Kendell Schultz MD (03/15 1505)      No hydronephrosis  Workstation performed: [id number]         XR chest portable   Final Result by Elizabeht Cartagena MD (03/13 1304)      Pulmonary vascular congestion is similar to prior study  Workstation performed: [id number]         VAS upper limb venous duplex scan, unilateral/limited   Final Result by Jordny Darby MD (03/10 1823)      XR chest portable   Final Result by Arline Baumgarten, MD (03/10 2881)      Increased pulmonary vascular congestion and development of small bilateral pleural effusions  Workstation performed: [id number]         CTA ED chest PE Study   Final Result by Cal Monreal MD (03/09 2032)         1  Small bilateral pleural effusions and bibasilar atelectasis    Few airspace opacities in the right lung could represent mild aspiration  2   No evidence of acute pulmonary embolus, thoracic aortic aneurysm or dissection  Workstation performed: [id number]         XR chest 1 view portable   Final Result by Renato Bravo MD (03/10 3991)      Hypoventilation with subsegmental atelectasis at the bases  Developing edema or inflammatory infiltrates are difficult to exclude  Please see subsequent CT for further report                  Workstation performed: [id number]             Portions of the record may have been created with voice recognition software  Occasional wrong word or "sound a like" substitutions may have occurred due to the inherent limitations of voice recognition software  Read the chart carefully and recognize, using context, where substitutions have occurred

## 2022-03-16 NOTE — PLAN OF CARE
Problem: Nutrition/Hydration-ADULT  Goal: Nutrient/Hydration intake appropriate for improving, restoring or maintaining nutritional needs  Description: Monitor and assess patient's nutrition/hydration status for malnutrition  Collaborate with interdisciplinary team and initiate plan and interventions as ordered  Monitor patient's weight and dietary intake as ordered or per policy  Utilize nutrition screening tool and intervene as necessary  Determine patient's food preferences and provide high-protein, high-caloric foods as appropriate       INTERVENTIONS:  - Monitor oral intake, urinary output, labs, and treatment plans  - Assess nutrition and hydration status and recommend course of action  - Evaluate amount of meals eaten  - Assist patient with eating if necessary   - Allow adequate time for meals  - Recommend/ encourage appropriate diets, oral nutritional supplements, and vitamin/mineral supplements  - Order, calculate, and assess calorie counts as needed  - Recommend, monitor, and adjust tube feedings and TPN/PPN based on assessed needs  - Assess need for intravenous fluids  - Provide specific nutrition/hydration education as appropriate  - Include patient/family/caregiver in decisions related to nutrition  Outcome: Progressing     Problem: Prexisting or High Potential for Compromised Skin Integrity  Goal: Skin integrity is maintained or improved  Description: INTERVENTIONS:  - Identify patients at risk for skin breakdown  - Assess and monitor skin integrity  - Assess and monitor nutrition and hydration status  - Monitor labs   - Turn and reposition patient  - Assist with mobility/ambulation  - Relieve pressure over bony prominences  - Avoid friction and shearing  - Provide appropriate hygiene as needed including keeping skin clean and dry  - Evaluate need for skin moisturizer/barrier cream  - Collaborate with interdisciplinary team   - Patient/family teaching    Outcome: Progressing     Problem: Potential for Falls  Goal: Patient will remain free of falls  Description: INTERVENTIONS:  - Educate patient/family on patient safety including physical limitations  - Instruct patient to call for assistance with activity   - Consult OT/PT to assist with strengthening/mobility   - Keep Call bell within reach  - Keep bed low and locked with side rails adjusted as appropriate  - Keep care items and personal belongings within reach  - Initiate and maintain comfort rounds  - Make Fall Risk Sign visible to staff    - Initiate/Maintain alarm  - Obtain necessary fall risk management equipment:   - Apply yellow socks and bracelet for high fall risk patients  - Consider moving patient to room near nurses station  Outcome: Progressing     Problem: MOBILITY - ADULT  Goal: Maintain or return to baseline ADL function  Description: INTERVENTIONS:  - Educate patient/family on patient safety including physical limitations  - Instruct patient to call for assistance with activity   - Consult OT/PT to assist with strengthening/mobility   - Keep Call bell within reach  - Keep bed low and locked with side rails adjusted as appropriate  - Keep care items and personal belongings within reach  - Initiate and maintain comfort rounds  - Make Fall Risk Sign visible to staff    - Apply yellow socks and bracelet for high fall risk patients  - Consider moving patient to room near nurses station  Outcome: Progressing  Goal: Maintains/Returns to pre admission functional level  Description: INTERVENTIONS:  - Perform BMAT or MOVE assessment daily    - Set and communicate daily mobility goal to care team and patient/family/caregiver  - Collaborate with rehabilitation services on mobility goals if consulted  - Perform Range of Motion 3 times a day  - Reposition patient every 2 hours    - Dangle patient 3 times a day  - Stand patient 3 times a day  - Ambulate patient 3  times a day  - Out of bed to chair 3 times a day   - Out of bed for meals 3 times a day  - Out of bed for toileting  - Record patient progress and toleration of activity level   Outcome: Progressing     Problem: PAIN - ADULT  Goal: Verbalizes/displays adequate comfort level or baseline comfort level  Description: Interventions:  - Encourage patient to monitor pain and request assistance  - Assess pain using appropriate pain scale  - Administer analgesics based on type and severity of pain and evaluate response  - Implement non-pharmacological measures as appropriate and evaluate response  - Consider cultural and social influences on pain and pain management  - Notify physician/advanced practitioner if interventions unsuccessful or patient reports new pain  Outcome: Progressing     Problem: INFECTION - ADULT  Goal: Absence or prevention of progression during hospitalization  Description: INTERVENTIONS:  - Assess and monitor for signs and symptoms of infection  - Monitor lab/diagnostic results  - Monitor all insertion sites, i e  indwelling lines, tubes, and drains  - Monitor endotracheal if appropriate and nasal secretions for changes in amount and color  - [place] appropriate cooling/warming therapies per order  - Administer medications as ordered  - Instruct and encourage patient and family to use good hand hygiene technique  - Identify and instruct in appropriate isolation precautions for identified infection/condition  Outcome: Progressing     Problem: SAFETY ADULT  Goal: Patient will remain free of falls  Description: INTERVENTIONS:  - Educate patient/family on patient safety including physical limitations  - Instruct patient to call for assistance with activity   - Consult OT/PT to assist with strengthening/mobility   - Keep Call bell within reach  - Keep bed low and locked with side rails adjusted as appropriate  - Keep care items and personal belongings within reach  - Initiate and maintain comfort rounds  - Make Fall Risk Sign visible to staff    - Initiate/Maintain alarm  - Obtain necessary fall risk management equipment:   - Apply yellow socks and bracelet for high fall risk patients  - Consider moving patient to room near nurses station  Outcome: Progressing  Goal: Maintain or return to baseline ADL function  Description: INTERVENTIONS:  - Educate patient/family on patient safety including physical limitations  - Instruct patient to call for assistance with activity   - Consult OT/PT to assist with strengthening/mobility   - Keep Call bell within reach  - Keep bed low and locked with side rails adjusted as appropriate  - Keep care items and personal belongings within reach  - Initiate and maintain comfort rounds  - Make Fall Risk Sign visible to staff    - Apply yellow socks and bracelet for high fall risk patients  - Consider moving patient to room near nurses station  Outcome: Progressing  Goal: Maintains/Returns to pre admission functional level  Description: INTERVENTIONS:  - Perform BMAT or MOVE assessment daily    - Set and communicate daily mobility goal to care team and patient/family/caregiver     - Collaborate with rehabilitation services on mobility goals if consulted  - Out of bed for toileting  - Record patient progress and toleration of activity level   Outcome: Progressing     Problem: DISCHARGE PLANNING  Goal: Discharge to home or other facility with appropriate resources  Description: INTERVENTIONS:  - Identify barriers to discharge w/patient and caregiver  - Arrange for needed discharge resources and transportation as appropriate  - Identify discharge learning needs (meds, wound care, etc )  - Arrange for interpretive services to assist at discharge as needed  - Refer to Case Management Department for coordinating discharge planning if the patient needs post-hospital services based on physician/advanced practitioner order or complex needs related to functional status, cognitive ability, or social support system  Outcome: Progressing     Problem: Knowledge Deficit  Goal: Patient/family/caregiver demonstrates understanding of disease process, treatment plan, medications, and discharge instructions  Description: Complete learning assessment and assess knowledge base    Interventions:  - Provide teaching at level of understanding  - Provide teaching via preferred learning methods  Outcome: Progressing     Problem: CARDIOVASCULAR - ADULT  Goal: Maintains optimal cardiac output and hemodynamic stability  Description: INTERVENTIONS:  - Monitor I/O, vital signs and rhythm  - Monitor for S/S and trends of decreased cardiac output  - Assess quality of pulses, skin color and temperature  - Instruct patient to report change in severity of symptoms  Outcome: Progressing  Goal: Absence of cardiac dysrhythmias or at baseline rhythm  Description: INTERVENTIONS:  - Continuous cardiac monitoring, vital signs, obtain 12 lead EKG if ordered  - Administer antiarrhythmic and heart rate control medications as ordered  - Monitor electrolytes and administer replacement therapy as ordered  Outcome: Progressing     Problem: RESPIRATORY - ADULT  Goal: Achieves optimal ventilation and oxygenation  Description: INTERVENTIONS:  - Assess for changes in respiratory status  - Assess for changes in mentation and behavior  - Position to facilitate oxygenation and minimize respiratory effort  - Oxygen administered by appropriate delivery if ordered  - Initiate smoking cessation education as indicated  - Encourage broncho-pulmonary hygiene including cough, deep breathe, Incentive Spirometry  - Assess the need for suctioning and aspirate as needed  - Assess and instruct to report SOB or any respiratory difficulty  - Respiratory Therapy support as indicated  Outcome: Progressing

## 2022-03-16 NOTE — ASSESSMENT & PLAN NOTE
· Creatinine continues to get worse and is up to 2 6  · Baseline Cr 1 4-1 6 with multiple episodes of SEBASTIAN  · Likely secondary to CRS in setting of CHF exacerbation complicated with contrast induced nephropathy  · Had CT contrast 3/9  · Nephrology consulted:  · Renal ultrasound showed no hydronephrosis  · Avoid nephrotoxins and hypotension  · Monitor BMP  · No urgent indication for dialysis  · Patient was on Lasix 60 milligram IV q 12 hours which was changed to torsemide 20 milligram p o  Daily today  · Might need accept higher creatinine to maintain euvolemic state  · If creatinine continues to remain stable possible discharge in a

## 2022-03-16 NOTE — PHYSICAL THERAPY NOTE
PT TREATMENT     03/16/22 1405   PT Last Visit   PT Visit Date 03/16/22   Note Type   Note Type Treatment   Pain Assessment   Pain Assessment Tool 0-10   Pain Score 7   Pain Location/Orientation Orientation: Left; Location: Shoulder   Restrictions/Precautions   LUE Weight Bearing Per Order NWB   Other Precautions Pain;O2;Fall Risk;Bed Alarm; Chair Alarm   General   Chart Reviewed Yes   Subjective   Subjective Doing okay   Transfers   Sit to Stand 5  Supervision   Additional items Verbal cues   Stand to Sit 5  Supervision   Additional items Verbal cues   Ambulation/Elevation   Gait pattern   (Increased lateral sway; slow kameron; short stride)   Gait Assistance   (S to minimal assistance)   Additional items Assist x 1;Verbal cues; Tactile cues   Assistive Device SPC   Distance 50 ft with change in direction; pt mostly ambulates with a cane and S but had 3 mild episodes of loss of balance needing Min assist of PT to correct; at end of ambulation pt sat out of bed in chair, removed her O2, blew her nose and then continued to have a bloody nose - pt deferred further PT - nurse attending to pt due to bloody nose   Balance   Static Sitting Fair +   Static Standing Fair   Dynamic Standing   (F to occasional F-)   Ambulatory   (F to occasional F-)   Activity Tolerance   Activity Tolerance Patient limited by fatigue;Patient limited by pain;Treatment limited secondary to medical complications (Comment)  (Bloody nose)   Assessment   Assessment Pt will benefit from continued skilled physical therapy services to increase transfers, ambulation with and without the cane and overall functional mobility to S/I  Pt continues with occasional balance loss with ambulation and remains at risk for falls  Pt is safe for discharge home with care of her niece, continued ambulation with/without the cane, and home PT  The patient's AM-PAC Basic Mobility Inpatient Short Form Raw Score is 15   A Raw score of less than or equal to 16 suggests the patient may benefit from discharge to post-acute rehabilitation services  Please also refer to the recommendation of the Physical Therapist for safe discharge planning  Despite ampac score, pt is safe for discharge home with assist of her niece and home PT  Plan   Treatment/Interventions ADL retraining;Functional transfer training;LE strengthening/ROM; Therapeutic exercise; Endurance training;Patient/family training;Equipment eval/education; Bed mobility;Gait training; Compensatory technique education   PT Frequency Other (Comment)  (5x/wk)   Recommendation   PT Discharge Recommendation Home with home health rehabilitation   Luna Aguilar [phone]   Turning in Bed Without Bedrails 2   Lying on Back to Sitting on Edge of Flat Bed 2   Moving Bed to Chair 3   Standing Up From Chair 3   Walk in Room 3   Climb 3-5 Stairs 2   Basic Mobility Inpatient Raw Score 15   Basic Mobility Standardized Score 36 97   Highest Level Of Mobility   JH-HLM Goal 4: Move to chair/commode   JH-HLM Highest Level of Mobility 7: Walk 25 feet or more   JH-HLM Goal Achieved Yes   Education   Education Provided Mobility training;Assistive device   Patient Demonstrates acceptance/verbal understanding;Explanation/teachback used;Demonstrates verbal understanding;Reinforcement needed   End of Consult   Patient Position at End of Consult Bedside chair; All needs within reach;Bed/Chair alarm activated   Licensure   [place] License Number  Gabrielle Coroneljed PT [id number]     Portions of the documentation may have been created using voice recognition software  Occasional wrong word or sound alike substitutions may have occurred due to the inherent limitation of the voice recognition software  Read the chart carefully and recognize, using context, where substitutions have occurred

## 2022-03-16 NOTE — ASSESSMENT & PLAN NOTE
· On arrival rapid a fib with RVR, responded to IV lopressor  · Continue metoprolol tartrate 50 mg PO BID at home   · DEMETRIS Vasc score 3, Dr Thornton Brought cleared to resume Eliquis    · Continue Eliquis 5 mg p o  B i d

## 2022-03-16 NOTE — PROGRESS NOTES
Nick [address]  Progress Note Miguel Dock 1950, 73 y o  female MRN: [id number]  Unit/Bed#: [address] [place] [address] Encounter: [id number]  Primary Care Provider: Kiersten Ponce   Date and time admitted to hospital: 3/9/2022  4:26 PM    * Acute diastolic congestive heart failure [facility])  Assessment & Plan  Wt Readings from Last 3 Encounters:   03/16/22 129 kg (284 lb 2 8 oz)   03/08/22 135 kg (297 lb 9 9 oz)   03/03/22 127 kg (279 lb 15 8 oz)     · SOB POA, secondary to acute on chronic diastolic CHF  BNP 5992  CT shows small b/l plerual effusions   · Recent shoulder surgery, SEBASTIAN, not on diuretics at SNF (previously on torsemide 10mg/d)  · ECHO performed 3/10 shows EF 00%, systolic function low normal, borderline concentric hypertrophy as per Cardiology report  · Procalcitonin negative - would hold off abx for aspiration related air space opacities seen on CT    · CTA chest negative for pulmonary embolism   · Patient had developed some SOB on 3/13; repeat chest xray continues to show pulmonary congestion  · Patient is on IV Lasix 60 milligram q 12 hours along with albumin  · Patient to be transitioned to torsemide 20 milligrams today  · Continue 2 liters oxygen  Patient arranged for home oxygen  · Continue metoprolol 50 mg q 12 hours  · Continue intake and output, daily weights  SEBASTIAN (acute kidney injury) ([facility] [address] )  Assessment & Plan  · Creatinine continues to get worse and is up to 2 6  · Baseline Cr 1 4-1 6 with multiple episodes of SEBASTIAN  · Likely secondary to CRS in setting of CHF exacerbation complicated with contrast induced nephropathy  · Had CT contrast 3/9  · Nephrology consulted:  · Renal ultrasound showed no hydronephrosis  · Avoid nephrotoxins and hypotension  · Monitor BMP  · No urgent indication for dialysis  · Patient was on Lasix 60 milligram IV q 12 hours which was changed to torsemide 20 milligram p o   Daily today  · Might need accept higher creatinine to maintain euvolemic state  · If creatinine continues to remain stable possible discharge in a m  Paroxysmal atrial fibrillation (HCC)  Assessment & Plan  · On arrival rapid a fib with RVR, responded to IV lopressor  · Continue metoprolol tartrate 50 mg PO BID at home   · DEMETRIS Vasc score 3, Dr Zaida Rodrigues cleared to resume Eliquis    · Continue Eliquis 5 mg p o  B i d  Anemia  Assessment & Plan  · Hgb stable, likely worsened secondary to post op loss     Morbid obesity with BMI of 45 0-49 9, adult ([facility] [address] )  Assessment & Plan  · Counseled weight loss   · Sleep apnea, refusing CPAP     Status post reverse total replacement of left shoulder  Assessment & Plan  · S/p fall resulting in Comminuted displaced fracture of the proximal humerus (2/23/22)  · S/p left shoulder reverse complete arthroplasty (3/1/22)   · Cleared from ortho standpoint for patient to start Eliquis   · LUE venous duplex is negative for DVT  · PT / OT consults appreciated; recommending home with therapy      Diabetes mellitus, type 2 [facility])  Assessment & Plan  Lab Results   Component Value Date    HGBA1C 6 8 (H) 03/09/2022     Recent Labs     03/15/22  1558 03/15/22  2006 03/16/22  0734 03/16/22  1105   POCGLU 193* 199* 175* 331*     Blood Sugar Average: Last 72 hrs:  (P) 003 [id number]   · Continue Lantus 20 units SQ daily   · A1c indicated good control   · Continue Humalog sliding scale    Essential hypertension  Assessment & Plan  · On lopressor PO; increased to 50 mg p o  Q 12 hours during hospitalization  · Continue Norvasc    Mixed hyperlipidemia  Assessment & Plan  · On rosuvastatin  · Heart healthy diet          VTE Pharmacologic Prophylaxis: VTE Score: 17 High Risk (Score >/= 5) - Pharmacological DVT Prophylaxis Ordered: apixaban (Eliquis)  Sequential Compression Devices Ordered  Patient Centered Rounds: I performed bedside rounds with nursing staff today    Discussions with Specialists or Other Care Team Provider: Dr Delfina Wiseman    Education and Discussions with Family / Patient: Attempted to update  (niece) via phone  Left voicemail  Time Spent for Care: 45 minutes  More than 50% of total time spent on counseling and coordination of care as described above  Current Length of Stay: 7 day(s)  Current Patient Status: Inpatient   Certification Statement: The patient will continue to require additional inpatient hospital stay due to Acute CHF, acute kidney injury  Discharge Plan: Anticipate discharge in 24-48 hrs to home with home services  Code Status: Level 1 - Full Code    Subjective:   Patient is feeling better  Denies any chest pain or shortness of breath  Legs are feeling better  Objective:     Vitals:   Temp (24hrs), Av 4 °F (36 3 °C), Min:97 2 °F (36 2 °C), Max:97 7 °F (36 5 °C)    Temp:  [97 2 °F (36 2 °C)-97 7 °F (36 5 °C)] 97 2 °F (36 2 °C)  HR:  [67-74] 67  Resp:  [16-18] 16  BP: (123-147)/(51-71) 136/59  SpO2:  [96 %-99 %] 96 %  Body mass index is 51 98 kg/m²  Input and Output Summary (last 24 hours): Intake/Output Summary (Last 24 hours) at 3/16/2022 1508  Last data filed at 3/16/2022 2202  Gross per 24 hour   Intake --   Output 400 ml   Net -400 ml       Physical Exam:   Physical Exam  Constitutional:       Appearance: Normal appearance  HENT:      Head: Normocephalic and atraumatic  Nose: Nose normal       Mouth/Throat:      Mouth: Mucous membranes are moist       Pharynx: Oropharynx is clear  Eyes:      Extraocular Movements: Extraocular movements intact  Pupils: Pupils are equal, round, and reactive to light  Cardiovascular:      Rate and Rhythm: Normal rate and regular rhythm  Pulmonary:      Effort: Pulmonary effort is normal       Breath sounds: Normal breath sounds  Abdominal:      General: Bowel sounds are normal  There is no distension  Palpations: Abdomen is soft  Tenderness: There is no abdominal tenderness  Musculoskeletal:         General: No swelling        Cervical back: Normal range of motion and neck supple  Right lower leg: Edema present  Left lower leg: Edema present  Comments: Bilateral trace pedal edema   Skin:     General: Skin is warm and dry  Neurological:      General: No focal deficit present  Mental Status: She is alert  Additional Data:     Labs:  Results from last 7 days   Lab Units 03/15/22  0503   WBC Thousand/uL 7 46   HEMOGLOBIN g/dL 8 2*   HEMATOCRIT % 28 8*   PLATELETS Thousands/uL 214   NEUTROS PCT % 60   LYMPHS PCT % 18   MONOS PCT % 13*   EOS PCT % 7*     Results from last 7 days   Lab Units 03/16/22  0616 03/15/22  0503 03/15/22  0503   SODIUM mmol/L 145   < > 144   POTASSIUM mmol/L 5 3   < > 4 8   CHLORIDE mmol/L 102   < > 104   CO2 mmol/L 37*   < > 38*   BUN mg/dL 53*   < > 40*   CREATININE mg/dL 2 44*   < > 2 16*   ANION GAP mmol/L 6   < > 2*   CALCIUM mg/dL 7 6*   < > 7 6*   ALBUMIN g/dL  --   --  2 9*   TOTAL BILIRUBIN mg/dL  --   --  0 23   ALK PHOS U/L  --   --  85   ALT U/L  --   --  14   AST U/L  --   --  12   GLUCOSE RANDOM mg/dL 188*   < > 194*    < > = values in this interval not displayed       Results from last 7 days   Lab Units 03/10/22  0513   INR  1 34*     Results from last 7 days   Lab Units 03/16/22  1105 03/16/22  0734 03/15/22  2006 03/15/22  1558 03/15/22  1137 03/15/22  0720 03/14/22  1949 03/14/22  1608 03/14/22  1150 03/14/22  0713 03/13/22  2019 03/13/22  1543   POC GLUCOSE mg/dl 331* 175* 199* 193* 281* 173* 206* 238* 164* 158* 211* 199*     Results from last 7 days   Lab Units 03/09/22  1830   HEMOGLOBIN A1C % 6 8*     Results from last 7 days   Lab Units 03/10/22  0513 03/09/22  2106   PROCALCITONIN ng/ml 0 08 0 08       Lines/Drains:  Invasive Devices  Report    Peripheral Intravenous Line            Peripheral IV 03/13/22 Right Antecubital 2 days          Drain            External Urinary Catheter 3 days                      Imaging: Reviewed radiology reports from this admission including: chest xray    Recent Cultures (last 7 days):   Results from last 7 days   Lab Units 03/10/22  0349 03/09/22 2126 03/09/22 2106   BLOOD CULTURE   --  No Growth After 5 Days  No Growth After 5 Days  URINE CULTURE  <10,000 cfu/ml   --   --        Last 24 Hours Medication List:   Current Facility-Administered Medications   Medication Dose Route Frequency Provider Last Rate    albuterol  2 puff Inhalation Q6H PRN Deirdre Perez, MD      aluminum-magnesium hydroxide-simethicone  30 mL Oral Q6H PRN Deirdrezane Perez, MD      amLODIPine  5 mg Oral Daily Deirdre Linker, MD      ammonium lactate   Topical BID Deirdre Linker, MD      apixaban  5 mg Oral BID Deirdre Linker, MD      docusate sodium  100 mg Oral BID Johnathan Ohara MD      insulin glargine  20 Units Subcutaneous HS Deirdre Linker, MD      insulin lispro  1-5 Units Subcutaneous TID [facility] Deirdre Perez, MD      insulin lispro  1-5 Units Subcutaneous HS Deirdre Perez, MD      lidocaine  2 patch Topical Daily Deirdre Linker, MD      loratadine  10 mg Oral Daily Deirdre Linker, MD      magnesium hydroxide  30 mL Oral Daily PRN Deirdre Perez, MD      metoprolol  5 mg Intravenous Q6H PRN Deirdre Perez, MD      metoprolol tartrate  50 mg Oral Q12H Mago Ch MD      montelukast  10 mg Oral HS Deirdre Linker, MD      nitroglycerin  0 4 mg Sublingual Q5 Min PRN Deirdrezane Perez, MD      nystatin   Topical BID Deirdre Linker, MD      ondansetron  4 mg Intravenous Q6H PRN Deirdre Linker, MD      pravastatin  80 mg Oral Daily With Sammy Ponce MD      pregabalin  100 mg Oral BID Deirdre Linker, MD      torsemide  20 mg Oral Daily Macariochantal Rowe MD      traMADol  50 mg Oral Q6H PRN Wanda Rm PA-C          Today, Patient Was Seen By: Johnathan Ohara MD    **Please Note: This note may have been constructed using a voice recognition system  **

## 2022-03-16 NOTE — ASSESSMENT & PLAN NOTE
Lab Results   Component Value Date    HGBA1C 6 8 (H) 03/09/2022     Recent Labs     03/15/22  1558 03/15/22  2006 03/16/22  0734 03/16/22  1105   POCGLU 193* 199* 175* 331*     Blood Sugar Average: Last 72 hrs:  (P) 233 [id number]   · Continue Lantus 20 units SQ daily   · A1c indicated good control   · Continue Humalog sliding scale

## 2022-03-17 ENCOUNTER — APPOINTMENT (INPATIENT)
Dept: RADIOLOGY | Facility: HOSPITAL | Age: 72
DRG: 291 | End: 2022-03-17
Payer: MEDICARE

## 2022-03-17 ENCOUNTER — PATIENT OUTREACH (OUTPATIENT)
Dept: CASE MANAGEMENT | Facility: OTHER | Age: 72
End: 2022-03-17

## 2022-03-17 LAB
ALBUMIN SERPL ELPH-MCNC: 3.35 G/DL (ref 3.5–5)
ALBUMIN SERPL ELPH-MCNC: 55.8 % (ref 52–65)
ALPHA1 GLOB SERPL ELPH-MCNC: 0.42 G/DL (ref 0.1–0.4)
ALPHA1 GLOB SERPL ELPH-MCNC: 7 % (ref 2.5–5)
ALPHA2 GLOB SERPL ELPH-MCNC: 0.83 G/DL (ref 0.4–1.2)
ALPHA2 GLOB SERPL ELPH-MCNC: 13.9 % (ref 7–13)
ANION GAP SERPL CALCULATED.3IONS-SCNC: 7 MMOL/L (ref 4–13)
BETA GLOB ABNORMAL SERPL ELPH-MCNC: 0.31 G/DL (ref 0.4–0.8)
BETA1 GLOB SERPL ELPH-MCNC: 5.1 % (ref 5–13)
BETA2 GLOB SERPL ELPH-MCNC: 6.3 % (ref 2–8)
BETA2+GAMMA GLOB SERPL ELPH-MCNC: 0.38 G/DL (ref 0.2–0.5)
BUN SERPL-MCNC: 53 MG/DL (ref 5–25)
CALCIUM SERPL-MCNC: 8 MG/DL (ref 8.3–10.1)
CHLORIDE SERPL-SCNC: 102 MMOL/L (ref 100–108)
CO2 SERPL-SCNC: 35 MMOL/L (ref 21–32)
CREAT SERPL-MCNC: 1.83 MG/DL (ref 0.6–1.3)
GAMMA GLOB ABNORMAL SERPL ELPH-MCNC: 0.71 G/DL (ref 0.5–1.6)
GAMMA GLOB SERPL ELPH-MCNC: 11.9 % (ref 12–22)
GFR SERPL CREATININE-BSD FRML MDRD: 27 ML/MIN/1.73SQ M
GLUCOSE SERPL-MCNC: 194 MG/DL (ref 65–140)
GLUCOSE SERPL-MCNC: 204 MG/DL (ref 65–140)
GLUCOSE SERPL-MCNC: 285 MG/DL (ref 65–140)
GLUCOSE SERPL-MCNC: 296 MG/DL (ref 65–140)
GLUCOSE SERPL-MCNC: 306 MG/DL (ref 65–140)
IGG/ALB SER: 1.26 {RATIO} (ref 1.1–1.8)
INTERPRETATION UR IFE-IMP: NORMAL
POTASSIUM SERPL-SCNC: 5.2 MMOL/L (ref 3.5–5.3)
PROT PATTERN SERPL ELPH-IMP: ABNORMAL
PROT SERPL-MCNC: 6 G/DL (ref 6.4–8.2)
SODIUM SERPL-SCNC: 144 MMOL/L (ref 136–145)

## 2022-03-17 PROCEDURE — 99232 SBSQ HOSP IP/OBS MODERATE 35: CPT | Performed by: STUDENT IN AN ORGANIZED HEALTH CARE EDUCATION/TRAINING PROGRAM

## 2022-03-17 PROCEDURE — 71250 CT THORAX DX C-: CPT

## 2022-03-17 PROCEDURE — 71045 X-RAY EXAM CHEST 1 VIEW: CPT

## 2022-03-17 PROCEDURE — 99232 SBSQ HOSP IP/OBS MODERATE 35: CPT | Performed by: INTERNAL MEDICINE

## 2022-03-17 PROCEDURE — 82948 REAGENT STRIP/BLOOD GLUCOSE: CPT

## 2022-03-17 PROCEDURE — 80048 BASIC METABOLIC PNL TOTAL CA: CPT | Performed by: INTERNAL MEDICINE

## 2022-03-17 PROCEDURE — 94640 AIRWAY INHALATION TREATMENT: CPT

## 2022-03-17 PROCEDURE — 94761 N-INVAS EAR/PLS OXIMETRY MLT: CPT

## 2022-03-17 RX ORDER — FUROSEMIDE 10 MG/ML
80 INJECTION INTRAMUSCULAR; INTRAVENOUS
Status: DISCONTINUED | OUTPATIENT
Start: 2022-03-17 | End: 2022-03-18

## 2022-03-17 RX ORDER — FUROSEMIDE 10 MG/ML
80 INJECTION INTRAMUSCULAR; INTRAVENOUS ONCE
Status: COMPLETED | OUTPATIENT
Start: 2022-03-17 | End: 2022-03-17

## 2022-03-17 RX ORDER — TORSEMIDE 20 MG/1
20 TABLET ORAL DAILY
Status: DISCONTINUED | OUTPATIENT
Start: 2022-03-18 | End: 2022-03-17

## 2022-03-17 RX ORDER — INSULIN GLARGINE 100 [IU]/ML
24 INJECTION, SOLUTION SUBCUTANEOUS
Status: DISCONTINUED | OUTPATIENT
Start: 2022-03-17 | End: 2022-03-19

## 2022-03-17 RX ORDER — IPRATROPIUM BROMIDE AND ALBUTEROL SULFATE 2.5; .5 MG/3ML; MG/3ML
3 SOLUTION RESPIRATORY (INHALATION) EVERY 4 HOURS PRN
Status: DISCONTINUED | OUTPATIENT
Start: 2022-03-17 | End: 2022-03-20

## 2022-03-17 RX ADMIN — FUROSEMIDE 80 MG: 10 INJECTION, SOLUTION INTRAVENOUS at 16:27

## 2022-03-17 RX ADMIN — ONDANSETRON 4 MG: 2 INJECTION INTRAMUSCULAR; INTRAVENOUS at 21:48

## 2022-03-17 RX ADMIN — INSULIN LISPRO 2 UNITS: 100 INJECTION, SOLUTION INTRAVENOUS; SUBCUTANEOUS at 21:39

## 2022-03-17 RX ADMIN — PREGABALIN 100 MG: 100 CAPSULE ORAL at 08:26

## 2022-03-17 RX ADMIN — PREGABALIN 100 MG: 100 CAPSULE ORAL at 17:01

## 2022-03-17 RX ADMIN — METOPROLOL TARTRATE 50 MG: 50 TABLET, FILM COATED ORAL at 21:39

## 2022-03-17 RX ADMIN — METOPROLOL TARTRATE 50 MG: 50 TABLET, FILM COATED ORAL at 08:26

## 2022-03-17 RX ADMIN — TRAMADOL HYDROCHLORIDE 50 MG: 50 TABLET, COATED ORAL at 21:43

## 2022-03-17 RX ADMIN — Medication: at 17:01

## 2022-03-17 RX ADMIN — PRAVASTATIN SODIUM 80 MG: 80 TABLET ORAL at 16:27

## 2022-03-17 RX ADMIN — FUROSEMIDE 80 MG: 10 INJECTION, SOLUTION INTRAMUSCULAR; INTRAVENOUS at 08:28

## 2022-03-17 RX ADMIN — IPRATROPIUM BROMIDE AND ALBUTEROL SULFATE 3 ML: 2.5; .5 SOLUTION RESPIRATORY (INHALATION) at 11:04

## 2022-03-17 RX ADMIN — DOCUSATE SODIUM 100 MG: 100 CAPSULE ORAL at 17:01

## 2022-03-17 RX ADMIN — INSULIN GLARGINE 24 UNITS: 100 INJECTION, SOLUTION SUBCUTANEOUS at 21:38

## 2022-03-17 RX ADMIN — INSULIN LISPRO 1 UNITS: 100 INJECTION, SOLUTION INTRAVENOUS; SUBCUTANEOUS at 08:27

## 2022-03-17 RX ADMIN — NYSTATIN: 100000 POWDER TOPICAL at 08:36

## 2022-03-17 RX ADMIN — DOCUSATE SODIUM 100 MG: 100 CAPSULE ORAL at 08:26

## 2022-03-17 RX ADMIN — LORATADINE 10 MG: 10 TABLET ORAL at 08:26

## 2022-03-17 RX ADMIN — INSULIN LISPRO 3 UNITS: 100 INJECTION, SOLUTION INTRAVENOUS; SUBCUTANEOUS at 11:40

## 2022-03-17 RX ADMIN — MONTELUKAST 10 MG: 10 TABLET, FILM COATED ORAL at 21:39

## 2022-03-17 RX ADMIN — APIXABAN 5 MG: 5 TABLET, FILM COATED ORAL at 08:26

## 2022-03-17 RX ADMIN — AMLODIPINE BESYLATE 5 MG: 5 TABLET ORAL at 08:26

## 2022-03-17 RX ADMIN — APIXABAN 5 MG: 5 TABLET, FILM COATED ORAL at 17:01

## 2022-03-17 RX ADMIN — LIDOCAINE 5% 2 PATCH: 700 PATCH TOPICAL at 08:27

## 2022-03-17 RX ADMIN — NYSTATIN: 100000 POWDER TOPICAL at 17:01

## 2022-03-17 RX ADMIN — INSULIN LISPRO 3 UNITS: 100 INJECTION, SOLUTION INTRAVENOUS; SUBCUTANEOUS at 16:26

## 2022-03-17 RX ADMIN — ALBUTEROL SULFATE 2 PUFF: 90 AEROSOL, METERED RESPIRATORY (INHALATION) at 07:24

## 2022-03-17 RX ADMIN — Medication: at 08:36

## 2022-03-17 NOTE — ASSESSMENT & PLAN NOTE
Wt Readings from Last 3 Encounters:   03/17/22 132 kg (292 lb 1 8 oz)   03/08/22 135 kg (297 lb 9 9 oz)   03/03/22 127 kg (279 lb 15 8 oz)     · SOB POA, secondary to acute on chronic diastolic CHF  BNP 5992  CT shows small b/l plerual effusions   · Recent shoulder surgery, SEBASTIAN, not on diuretics at SNF (previously on torsemide 10mg/d)  · ECHO performed 3/10 shows EF 00%, systolic function low normal, borderline concentric hypertrophy as per Cardiology report  · Procalcitonin negative - would hold off abx for aspiration related air space opacities seen on CT    · CTA chest negative for pulmonary embolism   · Patient had developed some SOB on 3/13; repeat chest xray continues to show pulmonary congestion  · Patient was on Lasix 60 milligram IV q 12 hours with albumin which was later changed to torsemide 20 milligram p o  Daily on 3/16/22  · Continue 2 liters oxygen  Patient arranged for home oxygen  · Continue metoprolol 50 mg q 12 hours  · Continue intake and output, daily weights  · Patient developed shortness of breath with wheezing again this morning which improved slightly with nebulizer treatment  Repeat chest x-ray showing persistent pulmonary vascular congestion  CT scan of the chest was ordered which showed septal thickening and alveolar nodules due to interstitial and alveolar edema with persistent moderate effusions  · Patient was given Lasix 80 milligram IV today along with torsemide 20 milligram p o  Daily  Might need to continue IV Lasix for better diuresis    Questionable Lasix drip

## 2022-03-17 NOTE — ASSESSMENT & PLAN NOTE
· Hgb stable, likely worsened secondary to post op loss   · Hemoglobin has been stable in the range of 8

## 2022-03-17 NOTE — PLAN OF CARE
Problem: Nutrition/Hydration-ADULT  Goal: Nutrient/Hydration intake appropriate for improving, restoring or maintaining nutritional needs  Description: Monitor and assess patient's nutrition/hydration status for malnutrition  Collaborate with interdisciplinary team and initiate plan and interventions as ordered  Monitor patient's weight and dietary intake as ordered or per policy  Utilize nutrition screening tool and intervene as necessary  Determine patient's food preferences and provide high-protein, high-caloric foods as appropriate       INTERVENTIONS:  - Monitor oral intake, urinary output, labs, and treatment plans  - Assess nutrition and hydration status and recommend course of action  - Evaluate amount of meals eaten  - Assist patient with eating if necessary   - Allow adequate time for meals  - Recommend/ encourage appropriate diets, oral nutritional supplements, and vitamin/mineral supplements  - Order, calculate, and assess calorie counts as needed  - Recommend, monitor, and adjust tube feedings and TPN/PPN based on assessed needs  - Assess need for intravenous fluids  - Provide specific nutrition/hydration education as appropriate  - Include patient/family/caregiver in decisions related to nutrition  Outcome: Progressing     Problem: Prexisting or High Potential for Compromised Skin Integrity  Goal: Skin integrity is maintained or improved  Description: INTERVENTIONS:  - Identify patients at risk for skin breakdown  - Assess and monitor skin integrity  - Assess and monitor nutrition and hydration status  - Monitor labs   - Turn and reposition patient  - Assist with mobility/ambulation  - Relieve pressure over bony prominences  - Avoid friction and shearing  - Provide appropriate hygiene as needed including keeping skin clean and dry  - Evaluate need for skin moisturizer/barrier cream  - Collaborate with interdisciplinary team   - Patient/family teaching    Outcome: Progressing     Problem: Potential for Falls  Goal: Patient will remain free of falls  Description: INTERVENTIONS:  - Educate patient/family on patient safety including physical limitations  - Instruct patient to call for assistance with activity   - Consult OT/PT to assist with strengthening/mobility   - Keep Call bell within reach  - Keep bed low and locked with side rails adjusted as appropriate  - Keep care items and personal belongings within reach  - Initiate and maintain comfort rounds  - Make Fall Risk Sign visible to staff    - Initiate/Maintain alarm  - Obtain necessary fall risk management equipment:   - Apply yellow socks and bracelet for high fall risk patients  - Consider moving patient to room near nurses station  Outcome: Progressing     Problem: MOBILITY - ADULT  Goal: Maintain or return to baseline ADL function  Description: INTERVENTIONS:  - Educate patient/family on patient safety including physical limitations  - Instruct patient to call for assistance with activity   - Consult OT/PT to assist with strengthening/mobility   - Keep Call bell within reach  - Keep bed low and locked with side rails adjusted as appropriate  - Keep care items and personal belongings within reach  - Initiate and maintain comfort rounds  - Make Fall Risk Sign visible to staff    - Apply yellow socks and bracelet for high fall risk patients  - Consider moving patient to room near nurses station  Outcome: Progressing  Goal: Maintains/Returns to pre admission functional level  Description: INTERVENTIONS:  - Perform BMAT or MOVE assessment daily    - Set and communicate daily mobility goal to care team and patient/family/caregiver     - Collaborate with rehabilitation services on mobility goals if consulted  - Out of bed for toileting  - Record patient progress and toleration of activity level   Outcome: Progressing     Problem: PAIN - ADULT  Goal: Verbalizes/displays adequate comfort level or baseline comfort level  Description: Interventions:  - Encourage patient to monitor pain and request assistance  - Assess pain using appropriate pain scale  - Administer analgesics based on type and severity of pain and evaluate response  - Implement non-pharmacological measures as appropriate and evaluate response  - Consider cultural and social influences on pain and pain management  - Notify physician/advanced practitioner if interventions unsuccessful or patient reports new pain  Outcome: Progressing     Problem: INFECTION - ADULT  Goal: Absence or prevention of progression during hospitalization  Description: INTERVENTIONS:  - Assess and monitor for signs and symptoms of infection  - Monitor lab/diagnostic results  - Monitor all insertion sites, i e  indwelling lines, tubes, and drains  - Monitor endotracheal if appropriate and nasal secretions for changes in amount and color  - [place] appropriate cooling/warming therapies per order  - Administer medications as ordered  - Instruct and encourage patient and family to use good hand hygiene technique  - Identify and instruct in appropriate isolation precautions for identified infection/condition  Outcome: Progressing     Problem: SAFETY ADULT  Goal: Patient will remain free of falls  Description: INTERVENTIONS:  - Educate patient/family on patient safety including physical limitations  - Instruct patient to call for assistance with activity   - Consult OT/PT to assist with strengthening/mobility   - Keep Call bell within reach  - Keep bed low and locked with side rails adjusted as appropriate  - Keep care items and personal belongings within reach  - Initiate and maintain comfort rounds  - Make Fall Risk Sign visible to staff    - Initiate/Maintain alarm  - Obtain necessary fall risk management equipment:   - Apply yellow socks and bracelet for high fall risk patients  - Consider moving patient to room near nurses station  Outcome: Progressing  Goal: Maintain or return to baseline ADL function  Description: INTERVENTIONS:  - Educate patient/family on patient safety including physical limitations  - Instruct patient to call for assistance with activity   - Consult OT/PT to assist with strengthening/mobility   - Keep Call bell within reach  - Keep bed low and locked with side rails adjusted as appropriate  - Keep care items and personal belongings within reach  - Initiate and maintain comfort rounds  - Make Fall Risk Sign visible to staff    - Apply yellow socks and bracelet for high fall risk patients  - Consider moving patient to room near nurses station  Outcome: Progressing  Goal: Maintains/Returns to pre admission functional level  Description: INTERVENTIONS:  - Perform BMAT or MOVE assessment daily    - Set and communicate daily mobility goal to care team and patient/family/caregiver  - Collaborate with rehabilitation services on mobility goals if consulted  - Out of bed for toileting  - Record patient progress and toleration of activity level   Outcome: Progressing     Problem: DISCHARGE PLANNING  Goal: Discharge to home or other facility with appropriate resources  Description: INTERVENTIONS:  - Identify barriers to discharge w/patient and caregiver  - Arrange for needed discharge resources and transportation as appropriate  - Identify discharge learning needs (meds, wound care, etc )  - Arrange for interpretive services to assist at discharge as needed  - Refer to Case Management Department for coordinating discharge planning if the patient needs post-hospital services based on physician/advanced practitioner order or complex needs related to functional status, cognitive ability, or social support system  Outcome: Progressing     Problem: Knowledge Deficit  Goal: Patient/family/caregiver demonstrates understanding of disease process, treatment plan, medications, and discharge instructions  Description: Complete learning assessment and assess knowledge base    Interventions:  - Provide teaching at level of understanding  - Provide teaching via preferred learning methods  Outcome: Progressing     Problem: CARDIOVASCULAR - ADULT  Goal: Maintains optimal cardiac output and hemodynamic stability  Description: INTERVENTIONS:  - Monitor I/O, vital signs and rhythm  - Monitor for S/S and trends of decreased cardiac output  - Assess quality of pulses, skin color and temperature  - Instruct patient to report change in severity of symptoms  Outcome: Progressing  Goal: Absence of cardiac dysrhythmias or at baseline rhythm  Description: INTERVENTIONS:  - Continuous cardiac monitoring, vital signs, obtain 12 lead EKG if ordered  - Administer antiarrhythmic and heart rate control medications as ordered  - Monitor electrolytes and administer replacement therapy as ordered  Outcome: Progressing     Problem: RESPIRATORY - ADULT  Goal: Achieves optimal ventilation and oxygenation  Description: INTERVENTIONS:  - Assess for changes in respiratory status  - Assess for changes in mentation and behavior  - Position to facilitate oxygenation and minimize respiratory effort  - Oxygen administered by appropriate delivery if ordered  - Initiate smoking cessation education as indicated  - Encourage broncho-pulmonary hygiene including cough, deep breathe, Incentive Spirometry  - Assess the need for suctioning and aspirate as needed  - Assess and instruct to report SOB or any respiratory difficulty  - Respiratory Therapy support as indicated  Outcome: Progressing

## 2022-03-17 NOTE — ASSESSMENT & PLAN NOTE
Lab Results   Component Value Date    HGBA1C 6 8 (H) 03/09/2022     Recent Labs     03/16/22  1555 03/16/22  1944 03/17/22  0719 03/17/22  1139   POCGLU 203* 255* 194* 306*     Blood Sugar Average: Last 72 hrs:  (P) 219 [id number]   · Increase Lantus to 24 units daily due to persistent elevated blood sugars  · A1c indicated good control   · Continue Humalog sliding scale

## 2022-03-17 NOTE — PROGRESS NOTES
Progress Note - Cardiology   Constantine Gutierrez 73 y o  female MRN: [id number]  Unit/Bed#: [address] [place] [address] Encounter: [id number]  03/17/22          Assessment:  1  Acute on chronic diastolic congestive heart failure - patient had acute dyspnea overnight with CT scan of chest done today showing findings consistent with ongoing congestion  She has bilateral pleural effusions along with vascular congestion   - she received furosemide 80 mg IV this morning   - Recommend additional dose this afternoon  - monitor urine output  - repeat standing weight  2  Acute kidney injury - patient with significant improvement in creatinine compared to yesterday  - nephrology has been consulted  3  Paroxysmal atrial fibrillation-currently in sinus rhythm  - continue Eliquis 5 mg b i d  Keiko Soulier - continue Lopressor 50 mg b i d  4  Hypertension - BP has been stable  5  S P Left shoulder replacement on 3/1/22  Plan:  As above      Subjective: Constantine Gutierrez had shortness of breath along with wheezing overnight  She received IV lasix this AM  Felt some improvement with albuterol nebulizer  Meds/Allergies   all current active meds have been reviewed  Allergies   Allergen Reactions    Penicillins Hives    Moxifloxacin Other (See Comments)     unknown    Percocet [Oxycodone-Acetaminophen] Other (See Comments)     unknown    Zinc Acetate Other (See Comments)     unknown    Nuts - Food Allergy Other (See Comments)    Asa [Aspirin] GI Intolerance    Indocin [Indomethacin] Other (See Comments)     Made patient "loopy"    Other Other (See Comments)     unknown       Objective   Vitals: Blood pressure 165/78, pulse 73, temperature (!) 97 °F (36 1 °C), temperature source Axillary, resp   rate 20, height 5' 2" (1 575 m), weight 132 kg (292 lb 1 8 oz), SpO2 96 %, not currently breastfeeding ,         Intake/Output Summary (Last 24 hours) at 3/17/2022 1130  Last data filed at 3/17/2022 0525  Gross per 24 hour   Intake 500 ml   Output 950 ml   Net -450 ml       Physical Exam   Constitutional: She appears healthy  No distress  Eyes: Pupils are equal, round, and reactive to light  Conjunctivae are normal    Neck: No JVD present  Cardiovascular: Normal rate and regular rhythm  Exam reveals distant heart sounds  Exam reveals no gallop and no friction rub  No murmur heard  Pulmonary/Chest: Effort normal  She has no rales  She has diffuse wheezes  Musculoskeletal:         General: Deformity present  No tenderness or edema  Cervical back: Normal range of motion and neck supple  Neurological: She is alert and oriented to person, place, and time  Skin: Skin is warm and dry         Lab Results:     Troponins:       Recent Results (from the past 24 hour(s))   Fingerstick Glucose (POCT)    Collection Time: 03/16/22  3:55 PM   Result Value Ref Range    POC Glucose 203 (H) 65 - 140 mg/dl   Fingerstick Glucose (POCT)    Collection Time: 03/16/22  7:44 PM   Result Value Ref Range    POC Glucose 255 (H) 65 - 140 mg/dl   Basic metabolic panel    Collection Time: 03/17/22  5:22 AM   Result Value Ref Range    Sodium 144 136 - 145 mmol/L    Potassium 5 2 3 5 - 5 3 mmol/L    Chloride 102 100 - 108 mmol/L    CO2 35 (H) 21 - 32 mmol/L    ANION GAP 7 4 - 13 mmol/L    BUN 53 (H) 5 - 25 mg/dL    Creatinine 1 83 (H) 0 60 - 1 30 mg/dL    Glucose 204 (H) 65 - 140 mg/dL    Calcium 8 0 (L) 8 3 - 10 1 mg/dL    eGFR 27 ml/min/1 73sq m   Fingerstick Glucose (POCT)    Collection Time: 03/17/22  7:19 AM   Result Value Ref Range    POC Glucose 194 (H) 65 - 140 mg/dl          Cardiac testing: Reviewed - See Above      Imaging: I have personally reviewed pertinent films in PACS - CT chest with pleural effusions and vascular congestion

## 2022-03-17 NOTE — PROGRESS NOTES
NEPHROLOGY PROGRESS NOTE   Gideon Petersen 73 y o  female MRN: [id number]  Unit/Bed#: [address] [place] [address] Encounter: [id number]  Reason for Consult: SEBASTIAN    ASSESSMENT AND PLAN:  73 y o  woman PMH of CKD G3b (creatinine were 1 4-1 6mg/dL) last time seen by Akosua Naidu in 2019, with multiple episodes of SEBASTIAN, DM, obesity, HTN, CHF, recent fall s/p left shoulder fracture, AFib on Eliquis p/w chest pain, elevated creatinine and fluid overload   Nephrology is consulted for SEBASTIAN in diuretic management        PLAN     #Non-Oliguric KDIGO SEBASTIAN stage 1 on CKD G3bA2 with evidence of recovery  · Etiology:  Likely secondary to CRS settings of CHF exacerbation complicated with contrast induced nephropathy  · Had CT with contrast on 3/9  · Baseline creatinine:  1 4-1 6 mg/dL with multiple episodes of SEBASTIAN  · Peak creatinine:  2 4 mg/dL  · Current creatinine:   1 8 mg/dL, close to baseline   · UA:  Leukocyturia, no micro hematuria  · Renal imaging :    no hydronephrosis  · Treatment:  · No indication of dialysis  ·  Maintain MAP:  Over 65 mmHg if possible/avoid hypoperfusion:  Hold parameters on blood pressure medications  · Avoid nephrotoxic agents such as NSAIDs, and IV contrast if possible   Avoid opioids   · Adjust medications to GFR     #CKD G3bA2  · Baseline creatinine:  1 4-1 6 mg/dL  · Etiology:  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        #Acid-base Disorder  · serum HCO3 35 millimoles per L  · Metabolic alkalosis likely secondary to vascular contraction in the settings of diuretics     #Volume status/hypertension:  · Volume:   more euvolemic, weight variation due to different scale  · Blood pressure:  Hypertensive BP was 165/78mmhg, goal< 140/90mmhg  · Recommend:  · Low-sodium diet  · S/p Lasix 60 mg IV  · Due to worsening shortness of breath today plan to give Lasix 80 mg once  · Start torsemide 20 mg  tomorrow morning  · Will accept higher levels of creatinine to achieve more appropriate fluid status  · Monitor urinary output, daily weight    #SOB  · Responded well to diuresis  · Improvement of lower extremity edema, weight loss  · Today more short of breath with diffuse wheezing  · Less likely fluid related  · Will have chest x-ray and nebulizer  · One dose of Lasix as above to help with respiratory status      #Anemia:  · Current hemoglobin:  8 2 mg/dL  · Treatment:  · Transfuse for hemoglobin less than 7 0 per primary service       # CHF exacerbation  · Diuretics as above  · Low-sodium and restriction      SUBJECTIVE:  Patient seen and examined at bedside  Patient feels short of breath this morning, within, good urinary output  No chest pain, no urinary complaints       OBJECTIVE:  Current Weight: Weight - Scale: 132 kg (292 lb 1 8 oz)  Vitals:    03/17/22 0731   BP: 165/78   Pulse: 65   Resp: 20   Temp: (!) 97 °F (36 1 °C)   SpO2: 96%       Intake/Output Summary (Last 24 hours) at 3/17/2022 0831  Last data filed at 3/17/2022 0525  Gross per 24 hour   Intake 500 ml   Output 950 ml   Net -450 ml     Wt Readings from Last 3 Encounters:   03/17/22 132 kg (292 lb 1 8 oz)   03/08/22 135 kg (297 lb 9 9 oz)   03/03/22 127 kg (279 lb 15 8 oz)     Temp Readings from Last 3 Encounters:   03/17/22 (!) 97 °F (36 1 °C) (Axillary)   03/09/22 97 9 °F (36 6 °C) (Tympanic)   03/07/22 (!) 97 4 °F (36 3 °C) (Oral)     BP Readings from Last 3 Encounters:   03/17/22 165/78   03/09/22 144/66   03/07/22 164/69     Pulse Readings from Last 3 Encounters:   03/17/22 65   03/09/22 89   03/07/22 76        General:  Obese, no acute distress at this time  Skin:  No acute rash  Eyes:  No scleral icterus and noninjected  ENT:  mucous membranes moist  Neck:  no carotid bruits  Chest:  Bilateral within, increased respiratory effort, no use of accessory respiratory muscles  CVS:  Regular rate and rhythm without a murmur rub , unable to assess JVD due to body habitus   Abdomen:  soft and nontender   Extremities:  Lower extremity edema 1+  Neuro:  No gross focality  Psych:  Alert , cooperative       Medications:    Current Facility-Administered Medications:     aluminum-magnesium hydroxide-simethicone (MYLANTA) oral suspension 30 mL, 30 mL, Oral, Q6H PRN, Wendy Og MD    amLODIPine (NORVASC) tablet 5 mg, 5 mg, Oral, Daily, Wendy Og MD, 5 mg at 03/17/22 0826    ammonium lactate (LAC-HYDRIN) 12 % lotion, , Topical, BID, Wendy Og MD, Given at 03/16/22 1720    apixaban (ELIQUIS) tablet 5 mg, 5 mg, Oral, BID, Wendy Og MD, 5 mg at 03/17/22 8922    docusate sodium (COLACE) capsule 100 mg, 100 mg, Oral, BID, Shana Miller MD, 100 mg at 03/17/22 0826    insulin glargine (LANTUS) subcutaneous injection 20 Units 0 2 mL, 20 Units, Subcutaneous, HS, Wendy Og MD, 20 Units at 03/16/22 2107    insulin lispro (HumaLOG) 100 units/mL subcutaneous injection 1-5 Units, 1-5 Units, Subcutaneous, TID AC, 1 Units at 03/17/22 0827 **AND** Fingerstick Glucose (POCT), , , TID AC, Wendy Og MD    insulin lispro (HumaLOG) 100 units/mL subcutaneous injection 1-5 Units, 1-5 Units, Subcutaneous, HS, Wendy Og MD, 2 Units at 03/16/22 2109    ipratropium-albuterol (DUO-NEB) 0 5-2 5 mg/3 mL inhalation solution 3 mL, 3 mL, Nebulization, Q4H PRN, Shana Miller MD    lidocaine (LIDODERM) 5 % patch 2 patch, 2 patch, Topical, Daily, Wendy Og MD, 2 patch at 03/17/22 0827    loratadine (CLARITIN) tablet 10 mg, 10 mg, Oral, Daily, Wendy Og MD, 10 mg at 03/17/22 6336    magnesium hydroxide (MILK OF MAGNESIA) oral suspension 30 mL, 30 mL, Oral, Daily PRN, Wendy Og MD    metoprolol (LOPRESSOR) injection 5 mg, 5 mg, Intravenous, Q6H PRN, Wendy Og MD    metoprolol tartrate (LOPRESSOR) tablet 50 mg, 50 mg, Oral, Q12H [address], Wendy Og MD, 50 mg at 03/17/22 0826    montelukast (SINGULAIR) tablet 10 mg, 10 mg, Oral, HS, Wendy Og MD, 10 mg at 03/16/22 2107    nitroglycerin (NITROSTAT) SL tablet 0 4 mg, 0 4 mg, Sublingual, Q5 Min PRN, Izabel Paredes MD    nystatin (MYCOSTATIN) powder, , Topical, BID, Izabel Paredes MD, Given at 03/16/22 1720    ondansetron [facility]) injection 4 mg, 4 mg, Intravenous, Q6H PRN, Izabel Paredes MD, 4 mg at 03/14/22 2128    pravastatin (PRAVACHOL) tablet 80 mg, 80 mg, Oral, Daily With Magnolia Beckham MD, 80 mg at 03/16/22 1555    pregabalin (LYRICA) capsule 100 mg, 100 mg, Oral, BID, Izabel Paredes MD, 100 mg at 03/17/22 0826    [START ON 3/18/2022] torsemide (DEMADEX) tablet 20 mg, 20 mg, Oral, Daily, Shelia Hall MD    traMADol Enrike Locker) tablet 50 mg, 50 mg, Oral, Q6H PRN, Mikey Rm PA-C, 50 mg at 03/14/22 2128    Laboratory Results:  Results from last 7 days   Lab Units 03/17/22  0522 03/16/22  0616 03/15/22  0503 03/14/22  0515 03/13/22  0445 03/12/22  0426   WBC Thousand/uL  --   --  7 46 7 82 8 23  --    HEMOGLOBIN g/dL  --   --  8 2* 8 8* 8 7*  --    HEMATOCRIT %  --   --  28 8* 29 6* 29 7*  --    PLATELETS Thousands/uL  --   --  214 223 253  --    SODIUM mmol/L 144 145 144 143 143 144   POTASSIUM mmol/L 5 2 5 3 4 8 4 5 4 5 4 3   CHLORIDE mmol/L 102 102 104 105 106 106   CO2 mmol/L 35* 37* 38* 38* 33* 34*   BUN mg/dL 53* 53* 40* 34* 33* 32*   CREATININE mg/dL 1 83* 2 44* 2 16* 1 89* 1 85* 1 78*   CALCIUM mg/dL 8 0* 7 6* 7 6* 7 6* 7 6* 7 6*       US kidney and bladder   Final Result by Shanda Perdue MD (03/15 1505)      No hydronephrosis  Workstation performed: [id number]         XR chest portable   Final Result by Damon Huber MD (03/13 0201)      Pulmonary vascular congestion is similar to prior study  Workstation performed: [id number]         VAS upper limb venous duplex scan, unilateral/limited   Final Result by Sarabjit Shah MD (03/10 5653)      XR chest portable   Final Result by Tu Dawson MD (03/10 8073)      Increased pulmonary vascular congestion and development of small bilateral pleural effusions  Workstation performed: [id number]         CTA ED chest PE Study   Final Result by Susie Low MD (03/09 2032)         1  Small bilateral pleural effusions and bibasilar atelectasis  Few airspace opacities in the right lung could represent mild aspiration  2   No evidence of acute pulmonary embolus, thoracic aortic aneurysm or dissection  Workstation performed: [id number]         XR chest 1 view portable   Final Result by Deb Garza MD (03/10 3728)      Hypoventilation with subsegmental atelectasis at the bases  Developing edema or inflammatory infiltrates are difficult to exclude  Please see subsequent CT for further report                  Workstation performed: [id number]         XR chest portable    (Results Pending)       Portions of the record may have been created with voice recognition software  Occasional wrong word or "sound a like" substitutions may have occurred due to the inherent limitations of voice recognition software  Read the chart carefully and recognize, using context, where substitutions have occurred

## 2022-03-17 NOTE — PLAN OF CARE
Problem: Nutrition/Hydration-ADULT  Goal: Nutrient/Hydration intake appropriate for improving, restoring or maintaining nutritional needs  Description: Monitor and assess patient's nutrition/hydration status for malnutrition  Collaborate with interdisciplinary team and initiate plan and interventions as ordered  Monitor patient's weight and dietary intake as ordered or per policy  Utilize nutrition screening tool and intervene as necessary  Determine patient's food preferences and provide high-protein, high-caloric foods as appropriate       INTERVENTIONS:  - Monitor oral intake, urinary output, labs, and treatment plans  - Assess nutrition and hydration status and recommend course of action  - Evaluate amount of meals eaten  - Assist patient with eating if necessary   - Allow adequate time for meals  - Recommend/ encourage appropriate diets, oral nutritional supplements, and vitamin/mineral supplements  - Order, calculate, and assess calorie counts as needed  - Recommend, monitor, and adjust tube feedings and TPN/PPN based on assessed needs  - Assess need for intravenous fluids  - Provide specific nutrition/hydration education as appropriate  - Include patient/family/caregiver in decisions related to nutrition  Outcome: Progressing     Problem: Prexisting or High Potential for Compromised Skin Integrity  Goal: Skin integrity is maintained or improved  Description: INTERVENTIONS:  - Identify patients at risk for skin breakdown  - Assess and monitor skin integrity  - Assess and monitor nutrition and hydration status  - Monitor labs   - Turn and reposition patient  - Assist with mobility/ambulation  - Relieve pressure over bony prominences  - Avoid friction and shearing  - Provide appropriate hygiene as needed including keeping skin clean and dry  - Evaluate need for skin moisturizer/barrier cream  - Collaborate with interdisciplinary team   - Patient/family teaching    Outcome: Progressing

## 2022-03-17 NOTE — ASSESSMENT & PLAN NOTE
· Creatinine continues to get worse and is up to 2 6  · Baseline Cr 1 4-1 6 with multiple episodes of SEBASTIAN  · Likely secondary to CRS in setting of CHF exacerbation complicated with contrast induced nephropathy  · Had CT contrast 3/9  · Nephrology consulted:  · Renal ultrasound showed no hydronephrosis  · Avoid nephrotoxins and hypotension  · Monitor BMP  · No urgent indication for dialysis  · Patient was on Lasix 60 milligram IV q 12 hours which was changed to torsemide 20 milligram p o  Daily today  · Might need accept higher creatinine to maintain euvolemic state  · Creatinine has improved significantly to 1 8    Continue IV diuresis as patient has persistent fluid overload

## 2022-03-17 NOTE — ASSESSMENT & PLAN NOTE
· On arrival rapid a fib with RVR, responded to IV lopressor  · Continue metoprolol tartrate 50 mg PO BID at home   · DEMETRIS Vasc score 3, Dr Michel Angelo cleared to resume Eliquis    · Continue Eliquis 5 mg p o  B i d

## 2022-03-17 NOTE — PROGRESS NOTES
[address]  Progress Note Madhav Corley 1950, 73 y o  female MRN: [id number]  Unit/Bed#: [address] [place] [address] Encounter: [id number]  Primary Care Provider: Leana French   Date and time admitted to hospital: 3/9/2022  4:26 PM    * Acute diastolic congestive heart failure [facility])  Assessment & Plan  Wt Readings from Last 3 Encounters:   03/17/22 132 kg (292 lb 1 8 oz)   03/08/22 135 kg (297 lb 9 9 oz)   03/03/22 127 kg (279 lb 15 8 oz)     · SOB POA, secondary to acute on chronic diastolic CHF  BNP 5992  CT shows small b/l plerual effusions   · Recent shoulder surgery, SEBASTIAN, not on diuretics at SNF (previously on torsemide 10mg/d)  · ECHO performed 3/10 shows EF 57%, systolic function low normal, borderline concentric hypertrophy as per Cardiology report  · Procalcitonin negative - would hold off abx for aspiration related air space opacities seen on CT    · CTA chest negative for pulmonary embolism   · Patient had developed some SOB on 3/13; repeat chest xray continues to show pulmonary congestion  · Patient was on Lasix 60 milligram IV q 12 hours with albumin which was later changed to torsemide 20 milligram p o  Daily on 3/16/22  · Continue 2 liters oxygen  Patient arranged for home oxygen  · Continue metoprolol 50 mg q 12 hours  · Continue intake and output, daily weights  · Patient developed shortness of breath with wheezing again this morning which improved slightly with nebulizer treatment  Repeat chest x-ray showing persistent pulmonary vascular congestion  CT scan of the chest was ordered which showed septal thickening and alveolar nodules due to interstitial and alveolar edema with persistent moderate effusions  · Patient was given Lasix 80 milligram IV today along with torsemide 20 milligram p o  Daily  Might need to continue IV Lasix for better diuresis    Questionable Lasix drip    [facility]  Assessment & Plan  · Creatinine continues to get worse and is up to 2 6  · Baseline Cr 1 4-1 6 with multiple episodes of SEBASTIAN  · Likely secondary to CRS in setting of CHF exacerbation complicated with contrast induced nephropathy  · Had CT contrast 3/9  · Nephrology consulted:  · Renal ultrasound showed no hydronephrosis  · Avoid nephrotoxins and hypotension  · Monitor BMP  · No urgent indication for dialysis  · Patient was on Lasix 60 milligram IV q 12 hours which was changed to torsemide 20 milligram p o  Daily today  · Might need accept higher creatinine to maintain euvolemic state  · Creatinine has improved significantly to 1 8  Continue IV diuresis as patient has persistent fluid overload    Paroxysmal atrial fibrillation (HCC)  Assessment & Plan  · On arrival rapid a fib with RVR, responded to IV lopressor  · Continue metoprolol tartrate 50 mg PO BID at home   · DEMETRIS Vasc score 3, Dr Dennise Schirmer cleared to resume Eliquis    · Continue Eliquis 5 mg p o  B i d  Anemia  Assessment & Plan  · Hgb stable, likely worsened secondary to post op loss   · Hemoglobin has been stable in the range of 8    Morbid obesity with BMI of 45 0-49 9, adult ([facility])  Assessment & Plan  · Counseled weight loss   · Sleep apnea, refusing CPAP     Status post reverse total replacement of left shoulder  Assessment & Plan  · S/p fall resulting in Comminuted displaced fracture of the proximal humerus (2/23/22)    · S/p left shoulder reverse complete arthroplasty (3/1/22)   · Cleared from ortho standpoint for patient to start Eliquis   · LUE venous duplex is negative for DVT  · PT / OT consults appreciated; recommending home with therapy      Diabetes mellitus, type 2 [facility])  Assessment & Plan  Lab Results   Component Value Date    HGBA1C 6 8 (H) 03/09/2022     Recent Labs     03/16/22  1555 03/16/22  1944 03/17/22  0719 03/17/22  1139   POCGLU 203* 255* 194* 306*     Blood Sugar Average: Last 72 hrs:  (P) 219 [id number]   · Increase Lantus to 24 units daily due to persistent elevated blood sugars  · A1c indicated good control   · Continue Humalog sliding scale    Essential hypertension  Assessment & Plan  · On lopressor PO; increased to 50 mg p o  Q 12 hours during hospitalization  · Continue Norvasc    Mixed hyperlipidemia  Assessment & Plan  · On rosuvastatin  · Heart healthy diet          VTE Pharmacologic Prophylaxis: VTE Score: 17 High Risk (Score >/= 5) - Pharmacological DVT Prophylaxis Ordered: apixaban (Eliquis)  Sequential Compression Devices Ordered  Patient Centered Rounds: I performed bedside rounds with nursing staff today  Discussions with Specialists or Other Care Team Provider: Dr Juanita Lyn    Education and Discussions with Family / Patient: Updated  (niece) via phone  Time Spent for Care: 45 minutes  More than 50% of total time spent on counseling and coordination of care as described above  Current Length of Stay: 8 day(s)  Current Patient Status: Inpatient   Certification Statement: The patient will continue to require additional inpatient hospital stay due to Acute CHF  Discharge Plan: Anticipate discharge in 48-72 hrs to home with home services  Code Status: Level 1 - Full Code    Subjective:   Patient reported shortness of breath of overnight  Patient had episode this morning when she had significant wheezing or shortness of breath with O2 saturation in high 80s  Objective:     Vitals:   Temp (24hrs), Av 7 °F (36 5 °C), Min:97 °F (36 1 °C), Max:98 7 °F (37 1 °C)    Temp:  [97 °F (36 1 °C)-98 7 °F (37 1 °C)] 97 °F (36 1 °C)  HR:  [65-81] 73  Resp:  [17-22] 20  BP: (151-176)/(72-86) 165/78  SpO2:  [92 %-98 %] 96 %  Body mass index is 53 43 kg/m²  Input and Output Summary (last 24 hours): Intake/Output Summary (Last 24 hours) at 3/17/2022 1439  Last data filed at 3/17/2022 0525  Gross per 24 hour   Intake 500 ml   Output 950 ml   Net -450 ml       Physical Exam:   Physical Exam  Constitutional:       Appearance: Normal appearance     HENT: Head: Normocephalic and atraumatic  Nose: Nose normal       Mouth/Throat:      Mouth: Mucous membranes are moist       Pharynx: Oropharynx is clear  Eyes:      Extraocular Movements: Extraocular movements intact  Pupils: Pupils are equal, round, and reactive to light  Cardiovascular:      Rate and Rhythm: Normal rate and regular rhythm  Pulmonary:      Effort: Pulmonary effort is normal       Breath sounds: Normal breath sounds  Abdominal:      General: Bowel sounds are normal  There is no distension  Palpations: Abdomen is soft  Tenderness: There is no abdominal tenderness  Musculoskeletal:         General: No swelling  Cervical back: Normal range of motion and neck supple  Right lower leg: Edema present  Left lower leg: Edema present  Skin:     General: Skin is warm and dry  Neurological:      General: No focal deficit present  Mental Status: She is alert  Additional Data:     Labs:  Results from last 7 days   Lab Units 03/15/22  0503   WBC Thousand/uL 7 46   HEMOGLOBIN g/dL 8 2*   HEMATOCRIT % 28 8*   PLATELETS Thousands/uL 214   NEUTROS PCT % 60   LYMPHS PCT % 18   MONOS PCT % 13*   EOS PCT % 7*     Results from last 7 days   Lab Units 03/17/22  0522 03/16/22  0616 03/15/22  0503   SODIUM mmol/L 144   < > 144   POTASSIUM mmol/L 5 2   < > 4 8   CHLORIDE mmol/L 102   < > 104   CO2 mmol/L 35*   < > 38*   BUN mg/dL 53*   < > 40*   CREATININE mg/dL 1 83*   < > 2 16*   ANION GAP mmol/L 7   < > 2*   CALCIUM mg/dL 8 0*   < > 7 6*   ALBUMIN g/dL  --   --  2 9*   TOTAL BILIRUBIN mg/dL  --   --  0 23   ALK PHOS U/L  --   --  85   ALT U/L  --   --  14   AST U/L  --   --  12   GLUCOSE RANDOM mg/dL 204*   < > 194*    < > = values in this interval not displayed           Results from last 7 days   Lab Units 03/17/22  1139 03/17/22  0719 03/16/22  1944 03/16/22  1555 03/16/22  1105 03/16/22  0734 03/15/22  2006 03/15/22  1558 03/15/22  1137 03/15/22  0720 03/14/22  1949 03/14/22  1608   POC GLUCOSE mg/dl 306* 194* 255* 203* 331* 175* 199* 193* 281* 173* 206* 238*               Lines/Drains:  Invasive Devices  Report    Peripheral Intravenous Line            Peripheral IV 03/13/22 Right Antecubital 3 days          Drain            External Urinary Catheter 4 days                      Imaging: Reviewed radiology reports from this admission including: chest xray and chest CT scan    Recent Cultures (last 7 days):         Last 24 Hours Medication List:   Current Facility-Administered Medications   Medication Dose Route Frequency Provider Last Rate    aluminum-magnesium hydroxide-simethicone  30 mL Oral Q6H PRN Duncan Brian MD      amLODIPine  5 mg Oral Daily Duncan Brian MD      ammonium lactate   Topical BID Duncan Brian MD      apixaban  5 mg Oral BID Duncan Brian MD      docusate sodium  100 mg Oral BID Kya Pickering MD      furosemide  80 mg Intravenous BID (diuretic) Janneth Quarles DO      insulin glargine  24 Units Subcutaneous HS Kya Pickering MD      insulin lispro  1-5 Units Subcutaneous TID AC Duncan Brian MD      insulin lispro  1-5 Units Subcutaneous HS Duncan Brian MD      ipratropium-albuterol  3 mL Nebulization Q4H PRN Kya Pickering MD      lidocaine  2 patch Topical Daily Duncan Brian MD      loratadine  10 mg Oral Daily Duncan Brian MD      magnesium hydroxide  30 mL Oral Daily PRN Duncan Brian MD      metoprolol  5 mg Intravenous Q6H PRN Duncan Brian MD      metoprolol tartrate  50 mg Oral Q12H Deanna Rojas MD      montelukast  10 mg Oral HS Duncan Brian MD      nitroglycerin  0 4 mg Sublingual Q5 Min PRN Duncan Brian MD      nystatin   Topical BID Duncan Brian MD      ondansetron  4 mg Intravenous Q6H PRN Duncan Brian MD      pravastatin  80 mg Oral Daily With Juan José Kwong MD      pregabalin  100 mg Oral BID Duncan Brian MD      traMADol  50 mg Oral Q6H PRN Mili Rm PA-C          Today, Patient Was Seen By: Lesvia Kimble MD    **Please Note: This note may have been constructed using a voice recognition system  **

## 2022-03-17 NOTE — PROGRESS NOTES
Patient currently admitted to [facility] on 3/9/2022 with CHF  This care manager assistant will continue to monitor via chart review throughout bundle episode

## 2022-03-18 ENCOUNTER — APPOINTMENT (INPATIENT)
Dept: NON INVASIVE DIAGNOSTICS | Facility: HOSPITAL | Age: 72
DRG: 291 | End: 2022-03-18
Attending: INTERNAL MEDICINE
Payer: MEDICARE

## 2022-03-18 LAB
ANION GAP SERPL CALCULATED.3IONS-SCNC: 4 MMOL/L (ref 4–13)
APPEARANCE FLD: ABNORMAL
BUN SERPL-MCNC: 49 MG/DL (ref 5–25)
CALCIUM SERPL-MCNC: 7.9 MG/DL (ref 8.3–10.1)
CHLORIDE SERPL-SCNC: 101 MMOL/L (ref 100–108)
CO2 SERPL-SCNC: 41 MMOL/L (ref 21–32)
COLOR FLD: ABNORMAL
CREAT SERPL-MCNC: 1.6 MG/DL (ref 0.6–1.3)
ERYTHROCYTE [DISTWIDTH] IN BLOOD BY AUTOMATED COUNT: 15.9 % (ref 11.6–15.1)
GFR SERPL CREATININE-BSD FRML MDRD: 32 ML/MIN/1.73SQ M
GLUCOSE FLD-MCNC: 261 MG/DL
GLUCOSE SERPL-MCNC: 157 MG/DL (ref 65–140)
GLUCOSE SERPL-MCNC: 168 MG/DL (ref 65–140)
GLUCOSE SERPL-MCNC: 234 MG/DL (ref 65–140)
GLUCOSE SERPL-MCNC: 249 MG/DL (ref 65–140)
GLUCOSE SERPL-MCNC: 336 MG/DL (ref 65–140)
HCT VFR BLD AUTO: 30.7 % (ref 34.8–46.1)
HGB BLD-MCNC: 8.8 G/DL (ref 11.5–15.4)
HISTIOCYTES NFR FLD: 23 %
LDH FLD L TO P-CCNC: 145 U/L
LYMPHOCYTES NFR BLD AUTO: 55 %
MCH RBC QN AUTO: 28.4 PG (ref 26.8–34.3)
MCHC RBC AUTO-ENTMCNC: 28.7 G/DL (ref 31.4–37.4)
MCV RBC AUTO: 99 FL (ref 82–98)
MONO+MESO NFR FLD MANUAL: 8 %
MONOCYTES NFR BLD AUTO: 7 %
NEUTS SEG NFR BLD AUTO: 7 %
PH BODY FLUID: 7.6
PLATELET # BLD AUTO: 228 THOUSANDS/UL (ref 149–390)
PMV BLD AUTO: 10.8 FL (ref 8.9–12.7)
POTASSIUM SERPL-SCNC: 5.1 MMOL/L (ref 3.5–5.3)
PROT FLD-MCNC: 2.5 G/DL
RBC # BLD AUTO: 3.1 MILLION/UL (ref 3.81–5.12)
SITE: ABNORMAL
SODIUM SERPL-SCNC: 146 MMOL/L (ref 136–145)
TOTAL CELLS COUNTED SPEC: 100
WBC # BLD AUTO: 6.99 THOUSAND/UL (ref 4.31–10.16)
WBC # FLD MANUAL: 1335 /UL

## 2022-03-18 PROCEDURE — 99232 SBSQ HOSP IP/OBS MODERATE 35: CPT | Performed by: STUDENT IN AN ORGANIZED HEALTH CARE EDUCATION/TRAINING PROGRAM

## 2022-03-18 PROCEDURE — 88305 TISSUE EXAM BY PATHOLOGIST: CPT | Performed by: PATHOLOGY

## 2022-03-18 PROCEDURE — 82945 GLUCOSE OTHER FLUID: CPT | Performed by: INTERNAL MEDICINE

## 2022-03-18 PROCEDURE — 85027 COMPLETE CBC AUTOMATED: CPT | Performed by: INTERNAL MEDICINE

## 2022-03-18 PROCEDURE — 32555 ASPIRATE PLEURA W/ IMAGING: CPT | Performed by: INTERNAL MEDICINE

## 2022-03-18 PROCEDURE — 97530 THERAPEUTIC ACTIVITIES: CPT | Performed by: PHYSICAL THERAPIST

## 2022-03-18 PROCEDURE — 87205 SMEAR GRAM STAIN: CPT | Performed by: INTERNAL MEDICINE

## 2022-03-18 PROCEDURE — 83615 LACTATE (LD) (LDH) ENZYME: CPT | Performed by: INTERNAL MEDICINE

## 2022-03-18 PROCEDURE — 99232 SBSQ HOSP IP/OBS MODERATE 35: CPT | Performed by: INTERNAL MEDICINE

## 2022-03-18 PROCEDURE — 87070 CULTURE OTHR SPECIMN AEROBIC: CPT | Performed by: INTERNAL MEDICINE

## 2022-03-18 PROCEDURE — 88112 CYTOPATH CELL ENHANCE TECH: CPT | Performed by: PATHOLOGY

## 2022-03-18 PROCEDURE — 82948 REAGENT STRIP/BLOOD GLUCOSE: CPT

## 2022-03-18 PROCEDURE — NC001 PR NO CHARGE: Performed by: INTERNAL MEDICINE

## 2022-03-18 PROCEDURE — 80048 BASIC METABOLIC PNL TOTAL CA: CPT | Performed by: INTERNAL MEDICINE

## 2022-03-18 PROCEDURE — 94760 N-INVAS EAR/PLS OXIMETRY 1: CPT

## 2022-03-18 PROCEDURE — 89051 BODY FLUID CELL COUNT: CPT | Performed by: INTERNAL MEDICINE

## 2022-03-18 PROCEDURE — 0W9B3ZZ DRAINAGE OF LEFT PLEURAL CAVITY, PERCUTANEOUS APPROACH: ICD-10-PCS | Performed by: INTERNAL MEDICINE

## 2022-03-18 PROCEDURE — 84157 ASSAY OF PROTEIN OTHER: CPT | Performed by: INTERNAL MEDICINE

## 2022-03-18 PROCEDURE — 97110 THERAPEUTIC EXERCISES: CPT | Performed by: PHYSICAL THERAPIST

## 2022-03-18 PROCEDURE — 83986 ASSAY PH BODY FLUID NOS: CPT | Performed by: INTERNAL MEDICINE

## 2022-03-18 PROCEDURE — 0W993ZZ DRAINAGE OF RIGHT PLEURAL CAVITY, PERCUTANEOUS APPROACH: ICD-10-PCS | Performed by: INTERNAL MEDICINE

## 2022-03-18 RX ORDER — LIDOCAINE WITH 8.4% SOD BICARB 0.9%(10ML)
SYRINGE (ML) INJECTION CODE/TRAUMA/SEDATION MEDICATION
Status: COMPLETED | OUTPATIENT
Start: 2022-03-18 | End: 2022-03-18

## 2022-03-18 RX ORDER — FUROSEMIDE 10 MG/ML
10 SYRINGE (ML) INJECTION CONTINUOUS
Status: DISCONTINUED | OUTPATIENT
Start: 2022-03-18 | End: 2022-03-21

## 2022-03-18 RX ADMIN — INSULIN LISPRO 2 UNITS: 100 INJECTION, SOLUTION INTRAVENOUS; SUBCUTANEOUS at 17:06

## 2022-03-18 RX ADMIN — APIXABAN 5 MG: 5 TABLET, FILM COATED ORAL at 08:25

## 2022-03-18 RX ADMIN — PRAVASTATIN SODIUM 80 MG: 80 TABLET ORAL at 17:06

## 2022-03-18 RX ADMIN — DOCUSATE SODIUM 100 MG: 100 CAPSULE ORAL at 17:06

## 2022-03-18 RX ADMIN — INSULIN LISPRO 1 UNITS: 100 INJECTION, SOLUTION INTRAVENOUS; SUBCUTANEOUS at 08:26

## 2022-03-18 RX ADMIN — NYSTATIN: 100000 POWDER TOPICAL at 17:07

## 2022-03-18 RX ADMIN — NYSTATIN: 100000 POWDER TOPICAL at 08:26

## 2022-03-18 RX ADMIN — METOPROLOL TARTRATE 50 MG: 50 TABLET, FILM COATED ORAL at 21:35

## 2022-03-18 RX ADMIN — INSULIN LISPRO 3 UNITS: 100 INJECTION, SOLUTION INTRAVENOUS; SUBCUTANEOUS at 21:35

## 2022-03-18 RX ADMIN — APIXABAN 5 MG: 5 TABLET, FILM COATED ORAL at 17:06

## 2022-03-18 RX ADMIN — Medication: at 17:07

## 2022-03-18 RX ADMIN — LORATADINE 10 MG: 10 TABLET ORAL at 08:25

## 2022-03-18 RX ADMIN — MONTELUKAST 10 MG: 10 TABLET, FILM COATED ORAL at 21:34

## 2022-03-18 RX ADMIN — INSULIN GLARGINE 24 UNITS: 100 INJECTION, SOLUTION SUBCUTANEOUS at 21:34

## 2022-03-18 RX ADMIN — FUROSEMIDE 80 MG: 10 INJECTION, SOLUTION INTRAVENOUS at 08:24

## 2022-03-18 RX ADMIN — PREGABALIN 100 MG: 100 CAPSULE ORAL at 17:06

## 2022-03-18 RX ADMIN — INSULIN LISPRO 2 UNITS: 100 INJECTION, SOLUTION INTRAVENOUS; SUBCUTANEOUS at 12:20

## 2022-03-18 RX ADMIN — Medication: at 08:26

## 2022-03-18 RX ADMIN — Medication 10 ML: at 16:05

## 2022-03-18 RX ADMIN — DOCUSATE SODIUM 100 MG: 100 CAPSULE ORAL at 08:25

## 2022-03-18 RX ADMIN — AMLODIPINE BESYLATE 5 MG: 5 TABLET ORAL at 08:25

## 2022-03-18 RX ADMIN — Medication 10 ML: at 15:55

## 2022-03-18 RX ADMIN — METOPROLOL TARTRATE 50 MG: 50 TABLET, FILM COATED ORAL at 08:25

## 2022-03-18 RX ADMIN — LIDOCAINE 5% 2 PATCH: 700 PATCH TOPICAL at 08:24

## 2022-03-18 RX ADMIN — PREGABALIN 100 MG: 100 CAPSULE ORAL at 08:25

## 2022-03-18 RX ADMIN — Medication 10 MG/HR: at 11:40

## 2022-03-18 NOTE — PHYSICAL THERAPY NOTE
PT TREATMENT     Time In: 1310  Time Out: 1340 03/18/22 1310   PT Last Visit   PT Visit Date 03/18/22   Pain Assessment   Pain Assessment Tool 0-10   Pain Score 5   Pain Location/Orientation Orientation: Left; Location: Shoulder   Restrictions/Precautions   Weight Bearing Precautions Per Order Yes   LUE Weight Bearing Per Order NWB   Braces or Orthoses Sling   Other Precautions Fall Risk;Pain;O2   General   Chart Reviewed Yes   Family/Caregiver Present No   Cognition   Overall Cognitive Status WFL   Arousal/Participation Alert; Cooperative   Attention Within functional limits   Orientation Level Oriented X4   Following Commands Follows all commands and directions without difficulty   Bed Mobility   Sit to Supine 4  Minimal assistance   Additional items Assist x 1   Transfers   Sit to Stand 4  Minimal assistance   Additional items Assist x 1   Stand to Sit 4  Minimal assistance   Additional items Assist x 1   Toilet transfer 4  Minimal assistance   Additional items Assist x 1;Standard toilet; Increased time required   Ambulation/Elevation   Gait pattern Wide ONUR; Foward flexed; Short stride   Gait Assistance 4  Minimal assist   Additional items Assist x 1   Assistive Device Other (Comment)  (PT HH)   Distance 40 feet x 1 within room; 20 feet bed <> toilet   Balance   Static Sitting Fair +   Dynamic Sitting Fair   Static Standing Fair   Activity Tolerance   Activity Tolerance Patient tolerated treatment well;Patient limited by fatigue   Nurse Made Aware RN [facility]   Exercises   Glute Sets Sitting;20 reps;AROM; Bilateral   Hip Flexion Sitting;10 reps;AROM; Bilateral   Knee AROM Long Arc Quad Sitting;15 reps;AROM; Bilateral   Ankle Pumps Sitting;20 reps;AROM; Bilateral   Assessment   Prognosis Good   Problem List Decreased strength;Decreased range of motion;Decreased endurance; Impaired balance;Decreased mobility; Decreased coordination;Orthopedic restrictions;Pain;Decreased skin integrity;Obesity   Assessment Pt seated in bedside chair upon PT arrival  Pt agreeable to PT session today  Patient demonstrating some fatigue with seated exercises performed today  Patient min A x 1 for all transfers sit <> stand, sit <> supine, and toilet transfer  PT removing purwick tubing today for ambulation  Patient ambulating within room 40 feet x 1 with PT hand hold for R UE  Patient reporting she needed to use bathroom, patient ambulating with RW to bathroom, urinating in toilet, PT assist needed for toileting and wiping  Patient returned to bed  Call bell and phone within reach  All needs met and pt reports no further questions for PT at this time  FELICIA Fernandez made aware of patient's need for new Purewick  The patient's AM-PAC Basic Mobility Inpatient Short Form Raw Score is 15  A Raw score of less than or equal to 16 suggests the patient may benefit from discharge to post-acute rehabilitation services  Please also refer to the recommendation of the Physical Therapist for safe discharge planning  Goals   STG Expiration Date 03/17/22   LTG Expiration Date 03/24/22   Plan   Treatment/Interventions ADL retraining;Functional transfer training;LE strengthening/ROM; Therapeutic exercise; Endurance training;Cognitive reorientation;Patient/family training;Bed mobility;Gait training;Spoke to nursing   Progress Progressing toward goals   Recommendation   PT Discharge Recommendation Home with home health rehabilitation   AM-PAC Basic Mobility Inpatient   Turning in Bed Without Bedrails 2   Lying on Back to Sitting on Edge of Flat Bed 2   Moving Bed to Chair 3   Standing Up From Chair 3   Walk in Room 3   Climb 3-5 Stairs 2   Basic Mobility Inpatient Raw Score 15   Basic Mobility Standardized Score 36 97   Highest Level Of Mobility   JH-HLM Goal 4: Move to chair/commode   JH-HLM Highest Level of Mobility 7: Walk 25 feet or more   JH-HLM Goal Achieved Yes   End of Consult   Patient Position at End of Consult Supine;Bed/Chair alarm activated; All needs within reach   [address] [facility] Number  Jan Odom PT, [place] [id number]      Subjective: Patient reports "okay" upon arrival PT arrival today  Patient seated in bedside chair and agreeable to PT session       Jan Odom PT, DPT [id number]

## 2022-03-18 NOTE — ASSESSMENT & PLAN NOTE
Lab Results   Component Value Date    HGBA1C 6 8 (H) 03/09/2022     Recent Labs     03/17/22  1625 03/17/22  2054 03/18/22  0708 03/18/22  1104   POCGLU 285* 296* 157* 249*     Blood Sugar Average: Last 72 hrs:  (P) 235 5   · Continue Lantus 24 units daily  · A1c indicated good control   · Continue Humalog sliding scale

## 2022-03-18 NOTE — ASSESSMENT & PLAN NOTE
· Hgb stable, likely worsened secondary to post op loss   · Hemoglobin has been stable in the range of 8 45

## 2022-03-18 NOTE — PLAN OF CARE
Problem: Nutrition/Hydration-ADULT  Goal: Nutrient/Hydration intake appropriate for improving, restoring or maintaining nutritional needs  Description: Monitor and assess patient's nutrition/hydration status for malnutrition  Collaborate with interdisciplinary team and initiate plan and interventions as ordered  Monitor patient's weight and dietary intake as ordered or per policy  Utilize nutrition screening tool and intervene as necessary  Determine patient's food preferences and provide high-protein, high-caloric foods as appropriate       INTERVENTIONS:  - Monitor oral intake, urinary output, labs, and treatment plans  - Assess nutrition and hydration status and recommend course of action  - Evaluate amount of meals eaten  - Assist patient with eating if necessary   - Allow adequate time for meals  - Recommend/ encourage appropriate diets, oral nutritional supplements, and vitamin/mineral supplements  - Order, calculate, and assess calorie counts as needed  - Recommend, monitor, and adjust tube feedings and TPN/PPN based on assessed needs  - Assess need for intravenous fluids  - Provide specific nutrition/hydration education as appropriate  - Include patient/family/caregiver in decisions related to nutrition  Outcome: Progressing     Problem: Prexisting or High Potential for Compromised Skin Integrity  Goal: Skin integrity is maintained or improved  Description: INTERVENTIONS:  - Identify patients at risk for skin breakdown  - Assess and monitor skin integrity  - Assess and monitor nutrition and hydration status  - Monitor labs   - Turn and reposition patient  - Assist with mobility/ambulation  - Relieve pressure over bony prominences  - Avoid friction and shearing  - Provide appropriate hygiene as needed including keeping skin clean and dry  - Evaluate need for skin moisturizer/barrier cream  - Collaborate with interdisciplinary team   - Patient/family teaching    Outcome: Progressing     Problem: Potential for Falls  Goal: Patient will remain free of falls  Description: INTERVENTIONS:  - Educate patient/family on patient safety including physical limitations  - Instruct patient to call for assistance with activity   - Consult OT/PT to assist with strengthening/mobility   - Keep Call bell within reach  - Keep bed low and locked with side rails adjusted as appropriate  - Keep care items and personal belongings within reach  - Initiate and maintain comfort rounds  - Make Fall Risk Sign visible to staff    - Initiate/Maintain alarm  - Obtain necessary fall risk management equipment:   - Apply yellow socks and bracelet for high fall risk patients  - Consider moving patient to room near nurses station  Outcome: Progressing     Problem: MOBILITY - ADULT  Goal: Maintain or return to baseline ADL function  Description: INTERVENTIONS:  - Educate patient/family on patient safety including physical limitations  - Instruct patient to call for assistance with activity   - Consult OT/PT to assist with strengthening/mobility   - Keep Call bell within reach  - Keep bed low and locked with side rails adjusted as appropriate  - Keep care items and personal belongings within reach  - Initiate and maintain comfort rounds  - Make Fall Risk Sign visible to staff    - Apply yellow socks and bracelet for high fall risk patients  - Consider moving patient to room near nurses station  Outcome: Progressing  Goal: Maintains/Returns to pre admission functional level  Description: INTERVENTIONS:  - Perform BMAT or MOVE assessment daily    - Set and communicate daily mobility goal to care team and patient/family/caregiver  - Collaborate with rehabilitation services on mobility goals if consulted  - Perform Range of Motion 4 times a day  - Reposition patient every 3 hours    - Dangle patient 3 times a day  - Stand patient 3 times a day  - Ambulate patient 3 times a day  - Out of bed to chair 3 times a day   - Out of bed for meals 3 times a day  - Out of bed for toileting  - Record patient progress and toleration of activity level   Outcome: Progressing     Problem: PAIN - ADULT  Goal: Verbalizes/displays adequate comfort level or baseline comfort level  Description: Interventions:  - Encourage patient to monitor pain and request assistance  - Assess pain using appropriate pain scale  - Administer analgesics based on type and severity of pain and evaluate response  - Implement non-pharmacological measures as appropriate and evaluate response  - Consider cultural and social influences on pain and pain management  - Notify physician/advanced practitioner if interventions unsuccessful or patient reports new pain  Outcome: Progressing     Problem: INFECTION - ADULT  Goal: Absence or prevention of progression during hospitalization  Description: INTERVENTIONS:  - Assess and monitor for signs and symptoms of infection  - Monitor lab/diagnostic results  - Monitor all insertion sites, i e  indwelling lines, tubes, and drains  - Monitor endotracheal if appropriate and nasal secretions for changes in amount and color  - [place] appropriate cooling/warming therapies per order  - Administer medications as ordered  - Instruct and encourage patient and family to use good hand hygiene technique  - Identify and instruct in appropriate isolation precautions for identified infection/condition  Outcome: Progressing     Problem: SAFETY ADULT  Goal: Patient will remain free of falls  Description: INTERVENTIONS:  - Educate patient/family on patient safety including physical limitations  - Instruct patient to call for assistance with activity   - Consult OT/PT to assist with strengthening/mobility   - Keep Call bell within reach  - Keep bed low and locked with side rails adjusted as appropriate  - Keep care items and personal belongings within reach  - Initiate and maintain comfort rounds  - Make Fall Risk Sign visible to staff    - Initiate/Maintain alarm  - Obtain necessary fall risk management equipment:   - Apply yellow socks and bracelet for high fall risk patients  - Consider moving patient to room near nurses station  Outcome: Progressing  Goal: Maintain or return to baseline ADL function  Description: INTERVENTIONS:  - Educate patient/family on patient safety including physical limitations  - Instruct patient to call for assistance with activity   - Consult OT/PT to assist with strengthening/mobility   - Keep Call bell within reach  - Keep bed low and locked with side rails adjusted as appropriate  - Keep care items and personal belongings within reach  - Initiate and maintain comfort rounds  - Make Fall Risk Sign visible to staff    - Apply yellow socks and bracelet for high fall risk patients  - Consider moving patient to room near nurses station  Outcome: Progressing  Goal: Maintains/Returns to pre admission functional level  Description: INTERVENTIONS:  - Perform BMAT or MOVE assessment daily    - Set and communicate daily mobility goal to care team and patient/family/caregiver  - Collaborate with rehabilitation services on mobility goals if consulted  - Perform Range of Motion 4 times a day  - Reposition patient every 2 hours    - Dangle patient 3 times a day  - Stand patient 3 times a day  - Ambulate patient 3 times a day  - Out of bed to chair 3 times a day   - Out of bed for meals 3 times a day  - Out of bed for toileting  - Record patient progress and toleration of activity level   Outcome: Progressing     Problem: DISCHARGE PLANNING  Goal: Discharge to home or other facility with appropriate resources  Description: INTERVENTIONS:  - Identify barriers to discharge w/patient and caregiver  - Arrange for needed discharge resources and transportation as appropriate  - Identify discharge learning needs (meds, wound care, etc )  - Arrange for interpretive services to assist at discharge as needed  - Refer to Case Management Department for coordinating discharge planning if the patient needs post-hospital services based on physician/advanced practitioner order or complex needs related to functional status, cognitive ability, or social support system  Outcome: Progressing     Problem: Knowledge Deficit  Goal: Patient/family/caregiver demonstrates understanding of disease process, treatment plan, medications, and discharge instructions  Description: Complete learning assessment and assess knowledge base    Interventions:  - Provide teaching at level of understanding  - Provide teaching via preferred learning methods  Outcome: Progressing     Problem: CARDIOVASCULAR - ADULT  Goal: Maintains optimal cardiac output and hemodynamic stability  Description: INTERVENTIONS:  - Monitor I/O, vital signs and rhythm  - Monitor for S/S and trends of decreased cardiac output  - Assess quality of pulses, skin color and temperature  - Instruct patient to report change in severity of symptoms  Outcome: Progressing  Goal: Absence of cardiac dysrhythmias or at baseline rhythm  Description: INTERVENTIONS:  - Continuous cardiac monitoring, vital signs, obtain 12 lead EKG if ordered  - Administer antiarrhythmic and heart rate control medications as ordered  - Monitor electrolytes and administer replacement therapy as ordered  Outcome: Progressing     Problem: RESPIRATORY - ADULT  Goal: Achieves optimal ventilation and oxygenation  Description: INTERVENTIONS:  - Assess for changes in respiratory status  - Assess for changes in mentation and behavior  - Position to facilitate oxygenation and minimize respiratory effort  - Oxygen administered by appropriate delivery if ordered  - Initiate smoking cessation education as indicated  - Encourage broncho-pulmonary hygiene including cough, deep breathe, Incentive Spirometry  - Assess the need for suctioning and aspirate as needed  - Assess and instruct to report SOB or any respiratory difficulty  - Respiratory Therapy support as indicated  Outcome: Progressing

## 2022-03-18 NOTE — PROGRESS NOTES
NEPHROLOGY PROGRESS NOTE   Kole Sharp 73 y o  female MRN: [id number]  Unit/Bed#: [address] [place] [address] Encounter: [id number]  Reason for Consult: SEBASTIAN    ASSESSMENT AND PLAN:  73 y o  woman PMH of CKD G3b (creatinine were 1 4-1 6mg/dL) last time seen by Akosua Naidu in 2019, with multiple episodes of SEBASTIAN, DM, obesity, HTN, CHF, recent fall s/p left shoulder fracture, AFib on Eliquis p/w chest pain, elevated creatinine and fluid overload   Nephrology is consulted for SEBASTIAN in diuretic management        PLAN     #Non-Oliguric KDIGO SEBASTIAN stage 1 on CKD G3bA2 with evidence of recovery  · Etiology:  Likely secondary to CRS settings of CHF exacerbation complicated with contrast induced nephropathy  · Had CT with contrast on 3/9  · Baseline creatinine:  1 4-1 6 mg/dL with multiple episodes of SEBASTIAN  · Peak creatinine:  2 4 mg/dL  · Current creatinine:  1 6 mg/dL, back at baseline  · UA:  Leukocyturia, no micro hematuria  · Renal imaging :    no hydronephrosis  · Treatment:  ·  Maintain MAP:  Over 65 mmHg if possible/avoid hypoperfusion:  Hold parameters on blood pressure medications  · Avoid nephrotoxic agents such as NSAIDs, and IV contrast if possible   Avoid opioids   · Adjust medications to GFR     #CKD G3bA2  · Baseline creatinine:  1 4-1 6 mg/dL  · Etiology:  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        #Acid-base Disorder  · serum HCO3 41 millimoles per L  · Metabolic alkalosis likely secondary to vascular contraction in the settings of diuretics with possible component of compensation for hypercapnia due to obesity  · VBG with next set of labs     #Volume status/blood pressure:  · Volume:  Hypervolemic  · Blood pressure:   hypertensive /64mmhg, goal< 140/90mmhg  · CT showed pulmonary edema  · Recommend:  · Start Lasix infusion 10 milligrams/hour  · Monitor urinary output  · Daily weight  · Will accept higher levels of creatinine to achieve more appropriate fluid status        #Anemia:  · Current hemoglobin:  7 9 mg/dL no albumin to correct  · Treatment:  · Transfuse for hemoglobin less than 7 0 per primary service       # CHF exacerbation  · Diuretics as above  · Low-sodium and restriction+  · Patient does not follow sodium and fluid restriction      SUBJECTIVE:  Patient seen and examined at bedside  Patient feels better today, she noticed improvement of shortness of breath, no chest pain  Good response to Lasix       OBJECTIVE:  Current Weight: Weight - Scale: 127 kg (280 lb 6 8 oz)  Vitals:    03/18/22 0745   BP: 139/64   Pulse: 70   Resp:    Temp: (!) 97 4 °F (36 3 °C)   SpO2: 96%     No intake or output data in the 24 hours ending 03/18/22 0941  Wt Readings from Last 3 Encounters:   03/18/22 127 kg (280 lb 6 8 oz)   03/08/22 135 kg (297 lb 9 9 oz)   03/03/22 127 kg (279 lb 15 8 oz)     Temp Readings from Last 3 Encounters:   03/18/22 (!) 97 4 °F (36 3 °C)   03/09/22 97 9 °F (36 6 °C) (Tympanic)   03/07/22 (!) 97 4 °F (36 3 °C) (Oral)     BP Readings from Last 3 Encounters:   03/18/22 139/64   03/09/22 144/66   03/07/22 164/69     Pulse Readings from Last 3 Encounters:   03/18/22 70   03/09/22 89   03/07/22 76      General:  Obese, no acute distress at this time  Skin:  No acute rash  Eyes:  No scleral icterus and noninjected  ENT:  mucous membranes moist  Neck:  no carotid bruits  Chest:  Clear to auscultation percussion, good respiratory effort, no use of accessory respiratory muscles  CVS:  Regular rate and rhythm without a murmur rub , unable to assess JVD due to body habitus  Abdomen:  soft and nontender   Extremities:  Lower extremity edema  Neuro:  No gross focality  Psych:  Alert , cooperative       Medications:    Current Facility-Administered Medications:     aluminum-magnesium hydroxide-simethicone (MYLANTA) oral suspension 30 mL, 30 mL, Oral, Q6H PRN, Jaimie Piper MD    amLODIPine (NORVASC) tablet 5 mg, 5 mg, Oral, Daily, Jaimie Piper MD, 5 mg at 03/18/22 0825    ammonium lactate (LAC-HYDRIN) 12 % lotion, , Topical, BID, Travis Last MD, Given at 03/18/22 0826    apixaban (ELIQUIS) tablet 5 mg, 5 mg, Oral, BID, Travis Last MD, 5 mg at 03/18/22 0825    docusate sodium (COLACE) capsule 100 mg, 100 mg, Oral, BID, [place] MD, 100 mg at 03/18/22 0825    furosemide (LASIX) injection 80 mg, 80 mg, Intravenous, BID (diuretic), Brice Auguste MD, 80 mg at 03/18/22 0824    insulin glargine (LANTUS) subcutaneous injection 24 Units 0 24 mL, 24 Units, Subcutaneous, HS, [place] MD, 24 Units at 03/17/22 2138    insulin lispro (HumaLOG) 100 units/mL subcutaneous injection 1-5 Units, 1-5 Units, Subcutaneous, TID AC, 1 Units at 03/18/22 0826 **AND** Fingerstick Glucose (POCT), , , TID AC, Travis Last MD    insulin lispro (HumaLOG) 100 units/mL subcutaneous injection 1-5 Units, 1-5 Units, Subcutaneous, HS, Travis Last MD, 2 Units at 03/17/22 2139    ipratropium-albuterol (DUO-NEB) 0 5-2 5 mg/3 mL inhalation solution 3 mL, 3 mL, Nebulization, Q4H PRN, [place] MD, 3 mL at 03/17/22 1104    lidocaine (LIDODERM) 5 % patch 2 patch, 2 patch, Topical, Daily, Travis Last MD, 2 patch at 03/18/22 0824    loratadine (CLARITIN) tablet 10 mg, 10 mg, Oral, Daily, Travis Last MD, 10 mg at 03/18/22 0825    magnesium hydroxide (MILK OF MAGNESIA) oral suspension 30 mL, 30 mL, Oral, Daily PRN, Travis Last MD    metoprolol (LOPRESSOR) injection 5 mg, 5 mg, Intravenous, Q6H PRN, Travis Last MD    metoprolol tartrate (LOPRESSOR) tablet 50 mg, 50 mg, Oral, Q12H [facility], Travis Last MD, 50 mg at 03/18/22 0825    montelukast (SINGULAIR) tablet 10 mg, 10 mg, Oral, HS, Travis Last MD, 10 mg at 03/17/22 2139    nitroglycerin (NITROSTAT) SL tablet 0 4 mg, 0 4 mg, Sublingual, Q5 Min PRN, Travis Last MD    nystatin (MYCOSTATIN) powder, , Topical, BID, Travis Last MD, Given at 03/18/22 0826    ondansetron (ZOFRAN) injection 4 mg, 4 mg, Intravenous, Q6H PRN, Jose Alfredo Badillo MD, 4 mg at 03/17/22 2148    pravastatin (PRAVACHOL) tablet 80 mg, 80 mg, Oral, Daily With Rodger Stephen MD, 80 mg at 03/17/22 1627    pregabalin (LYRICA) capsule 100 mg, 100 mg, Oral, BID, Jose Alfredo Badillo MD, 100 mg at 03/18/22 0825    traMADol (ULTRAM) tablet 50 mg, 50 mg, Oral, Q6H PRN, Mikey Rm PA-C, 50 mg at 03/17/22 2143    Laboratory Results:  Results from last 7 days   Lab Units 03/18/22  0650 03/17/22  0522 03/16/22  0616 03/15/22  0503 03/14/22  0515 03/13/22  0445 03/12/22  0426   WBC Thousand/uL 6 99  --   --  7 46 7 82 8 23  --    HEMOGLOBIN g/dL 8 8*  --   --  8 2* 8 8* 8 7*  --    HEMATOCRIT % 30 7*  --   --  28 8* 29 6* 29 7*  --    PLATELETS Thousands/uL 228  --   --  214 223 253  --    SODIUM mmol/L 146* 144 145 144 143 143 144   POTASSIUM mmol/L 5 1 5 2 5 3 4 8 4 5 4 5 4 3   CHLORIDE mmol/L 101 102 102 104 105 106 106   CO2 mmol/L 41* 35* 37* 38* 38* 33* 34*   BUN mg/dL 49* 53* 53* 40* 34* 33* 32*   CREATININE mg/dL 1 60* 1 83* 2 44* 2 16* 1 89* 1 85* 1 78*   CALCIUM mg/dL 7 9* 8 0* 7 6* 7 6* 7 6* 7 6* 7 6*       CT chest wo contrast   Final Result by German Snowden MD (03/17 1025)      Nothing to suggest pneumonia  Septal thickening and alveolar nodules due to interstitial and alveolar edema with persistent moderate effusions  Workstation performed: [id number]         XR chest portable   Final Result by German Snowden MD (03/17 2107)      Persistent mild pulmonary edema with small effusions  Workstation performed: [id number]         US kidney and bladder   Final Result by Dorie Dave MD (03/15 0947)      No hydronephrosis  Workstation performed: [id number]         XR chest portable   Final Result by Dez Booth MD (03/13 1304)      Pulmonary vascular congestion is similar to prior study                    Workstation performed: [id number]         VAS upper limb venous duplex scan, unilateral/limited   Final Result by Tejas Sifuentes MD (03/10 1823)      XR chest portable   Final Result by Darrell Rivas MD (03/10 0122)      Increased pulmonary vascular congestion and development of small bilateral pleural effusions  Workstation performed: [id number]         CTA ED chest PE Study   Final Result by Lacey Milner MD (03/09 2032)         1  Small bilateral pleural effusions and bibasilar atelectasis  Few airspace opacities in the right lung could represent mild aspiration  2   No evidence of acute pulmonary embolus, thoracic aortic aneurysm or dissection  Workstation performed: [id number]         XR chest 1 view portable   Final Result by Miguel Angel Bermeo MD (03/10 3833)      Hypoventilation with subsegmental atelectasis at the bases  Developing edema or inflammatory infiltrates are difficult to exclude  Please see subsequent CT for further report                  Workstation performed: [id number]         IR IN-Patient Thoracentesis    (Results Pending)       Portions of the record may have been created with voice recognition software  Occasional wrong word or "sound a like" substitutions may have occurred due to the inherent limitations of voice recognition software  Read the chart carefully and recognize, using context, where substitutions have occurred

## 2022-03-18 NOTE — ASSESSMENT & PLAN NOTE
· On arrival rapid a fib with RVR, responded to IV lopressor  · Continue metoprolol tartrate 50 mg PO BID at home   · DEMETRIS Vasc score 3, Dr Dennise Schirmer cleared to resume Eliquis    · Continue Eliquis 5 mg p o  B i d

## 2022-03-18 NOTE — SEDATION DOCUMENTATION
Procedure ended  Bilateral thoracentesis completed by Dr Jayshree Avila  500 ml red fluid removed from right; 300 ml ange fluid removed from left  Bandaids applied  Patient tolerated well   Specimen sent to lab

## 2022-03-18 NOTE — PROGRESS NOTES
Progress Note - Cardiology   [facility]     Faheem Lemus 73 y o  female MRN: [id number]  : 1950  Unit/Bed#: [address] [place] [address] Encounter: [id number]    Assessment and Plan:   1  Acute on chronic diastolic heart failure:  Noted patient was not discharged on her home dose of torsemide    -  patient diuretics were escalated to Lasix 80 mg IV b i d  Yesterday per Nephrology and patient states she feels much better  Weight today is 280 lbs    -  low-sodium diet    -  I&O, daily weights and monitor labs     2  Acute kidney injury on chronic kidney disease:  Previous baseline creatinine was 1 4 to 1 6 in 2019     Creatinine today is 1 6    -  followed by Nephrology     3  Superficial thrombophlebitis of left cephalic vein:  Eliquis resumed     4  Paroxysmal atrial fibrillation:  Currently sinus rhythm    -  Eliquis 5 mg b i d  Was resumed during this admission    -  continue Lopressor 50 mg q 12 hours      5  Hypertension:  Blood pressure stable on Lopressor 50 mg b i d  And Norvasc 5 mg daily    -  continue to monitor     6  Postop Anemia:  Hemoglobin stable     7  Left total shoulder replacement:  2022     Subjective / Objective:   Patient seen and examined  Yesterday she had episode of shortness of breath Wednesday night into Thursday morning  She states that today she feels somewhat better after receiving IV Lasix 80 mg b i d  Per Nephrology  Her weight is down to 280 lb per standing scale  Still with some fine crackles at bases  Vitals: Blood pressure 139/64, pulse 70, temperature (!) 97 4 °F (36 3 °C), resp  rate 16, height 5' 2" (1 575 m), weight 127 kg (280 lb 6 8 oz), SpO2 96 %, not currently breastfeeding  Vitals:    22 0551 22 0559   Weight: 132 kg (292 lb 1 8 oz) 127 kg (280 lb 6 8 oz)     Body mass index is 51 29 kg/m²    BP Readings from Last 3 Encounters:   22 139/64   22 144/66   22 164/69     Orthostatic Blood Pressures      Most Recent Value   Blood Pressure 139/64 filed at 2022 0745   Patient Position - Orthostatic VS Lying filed at 2022 2137        I/O        0701   0700  0701   0700  0701   0700    P  O  500      Total Intake(mL/kg) 500 (3 8)      Urine (mL/kg/hr) 950 (0 3)      Total Output 950      Net -450                 Invasive Devices  Report    Peripheral Intravenous Line            Peripheral IV 22 Right Antecubital 4 days    Peripheral IV 22 Right Forearm <1 day          Drain            External Urinary Catheter 4 days                No intake or output data in the 24 hours ending 22 0817      Physical Exam:   Physical Exam  Vitals and nursing note reviewed  Constitutional:       Appearance: Normal appearance  She is morbidly obese  HENT:      Right Ear: External ear normal       Left Ear: External ear normal       Nose: Nose normal    Eyes:      General: No scleral icterus  Right eye: No discharge  Left eye: No discharge  Cardiovascular:      Rate and Rhythm: Normal rate and regular rhythm  Pulses: Normal pulses  Heart sounds: Heart sounds are distant  Pulmonary:      Effort: No accessory muscle usage or respiratory distress  Breath sounds: Examination of the right-lower field reveals decreased breath sounds and rales  Examination of the left-lower field reveals decreased breath sounds  Decreased breath sounds and rales present  Abdominal:      General: Bowel sounds are normal  There is no distension  Palpations: Abdomen is soft  Musculoskeletal:         General: Swelling (Left hand) present  Right lower le+ Pitting Edema present  Left lower le+ Pitting Edema present  Skin:     General: Skin is warm and dry  Capillary Refill: Capillary refill takes less than 2 seconds  Neurological:      General: No focal deficit present  Mental Status: She is alert and oriented to person, place, and time  Mental status is at baseline     Psychiatric:         Mood and Affect: Mood normal                    Medications/ Allergies:     Current Facility-Administered Medications   Medication Dose Route Frequency Provider Last Rate    aluminum-magnesium hydroxide-simethicone  30 mL Oral Q6H PRN Maynor Telles MD      amLODIPine  5 mg Oral Daily Maynor Telles MD      ammonium lactate   Topical BID Maynor Telles MD      apixaban  5 mg Oral BID Maynor Telles MD      docusate sodium  100 mg Oral BID Salud Millan MD      furosemide  80 mg Intravenous BID (diuretic) MD Singh Rogers insulin glargine  24 Units Subcutaneous HS Salud Millan MD      insulin lispro  1-5 Units Subcutaneous TID AC Maynor Telles MD      insulin lispro  1-5 Units Subcutaneous HS Maynor Telles MD      ipratropium-albuterol  3 mL Nebulization Q4H PRN Salud Millan MD      lidocaine  2 patch Topical Daily Maynor Telles MD      loratadine  10 mg Oral Daily Maynor Telles MD      magnesium hydroxide  30 mL Oral Daily PRN Maynor Telles MD      metoprolol  5 mg Intravenous Q6H PRN Maynor Telles MD      metoprolol tartrate  50 mg Oral Q12H [address] Maynor Telles MD      montelukast  10 mg Oral HS Maynor Telles MD      nitroglycerin  0 4 mg Sublingual Q5 Min PRN Maynor Telles MD      nystatin   Topical BID Maynor Telles MD      ondansetron  4 mg Intravenous Q6H PRN Maynor Telles MD      pravastatin  80 mg Oral Daily With Ayanna Murillo MD      pregabalin  100 mg Oral BID Maynor Telles MD      traMADol  50 mg Oral Q6H PRN Mikey Rm PA-C       aluminum-magnesium hydroxide-simethicone, 30 mL, Q6H PRN  ipratropium-albuterol, 3 mL, Q4H PRN  magnesium hydroxide, 30 mL, Daily PRN  metoprolol, 5 mg, Q6H PRN  nitroglycerin, 0 4 mg, Q5 Min PRN  ondansetron, 4 mg, Q6H PRN  traMADol, 50 mg, Q6H PRN      Allergies   Allergen Reactions    Penicillins Hives    Moxifloxacin Other (See Comments)     unknown    Percocet [Oxycodone-Acetaminophen] Other (See Comments)     unknown    Zinc Acetate Other (See Comments)     unknown    Nuts - Food Allergy Other (See Comments)    Asa [Aspirin] GI Intolerance    Indocin [Indomethacin] Other (See Comments)     Made patient "loopy"    Other Other (See Comments)     unknown       VTE Pharmacologic Prophylaxis:   Sequential compression device (Venodyne)     Labs:   CBC with diff:  Results from last 7 days   Lab Units 03/18/22  0650 03/15/22  0503 03/14/22  0515 03/13/22  0445   WBC Thousand/uL 6 99 7 46 7 82 8 23   HEMOGLOBIN g/dL 8 8* 8 2* 8 8* 8 7*   HEMATOCRIT % 30 7* 28 8* 29 6* 29 7*   MCV fL 99* 101* 98 97   PLATELETS Thousands/uL 228 214 223 253   MCH pg 28 4 28 8 29 1 28 4   MCHC g/dL 28 7* 28 5* 29 7* 29 3*   RDW % 15 9* 15 8* 15 5* 15 5*   MPV fL 10 8 10 2 10 2 10 1   NRBC AUTO /100 WBCs  --  0  --   --      CMP:  Results from last 7 days   Lab Units 03/18/22  0650 03/17/22  0522 03/16/22  0616 03/15/22  0503 03/14/22  0515 03/13/22  0445 03/12/22  0426   SODIUM mmol/L 146* 144 145 144 143 143 144   POTASSIUM mmol/L 5 1 5 2 5 3 4 8 4 5 4 5 4 3   CHLORIDE mmol/L 101 102 102 104 105 106 106   CO2 mmol/L 41* 35* 37* 38* 38* 33* 34*   ANION GAP mmol/L 4 7 6 2* 0* 4 4   BUN mg/dL 49* 53* 53* 40* 34* 33* 32*   CREATININE mg/dL 1 60* 1 83* 2 44* 2 16* 1 89* 1 85* 1 78*   CALCIUM mg/dL 7 9* 8 0* 7 6* 7 6* 7 6* 7 6* 7 6*   AST U/L  --   --   --  12 16  --   --    ALT U/L  --   --   --  14 20  --   --    ALK PHOS U/L  --   --   --  85 87  --   --    TOTAL PROTEIN g/dL  --   --  6 0* 5 9* 5 9*  --   --    ALBUMIN g/dL  --   --   --  2 9* 2 6*  --   --    TOTAL BILIRUBIN mg/dL  --   --   --  0 23 0 29  --   --    EGFR ml/min/1 73sq m 32 27 19 22 26 27 28       Imaging & Testing   I have personally reviewed pertinent reports  XR chest portable    Result Date: 3/17/2022  Narrative: CHEST INDICATION:   CHF  COMPARISON:  Chest radiograph from 3/13/2022 and 3/10/2022, chest CT from 3/9/2022  EXAM PERFORMED/VIEWS:  XR CHEST PORTABLE FINDINGS: Cardiomediastinal silhouette appears unremarkable  Persistent pulmonary edema with small effusions  No pneumothorax  Reverse left shoulder arthroplasty  Impression: Persistent mild pulmonary edema with small effusions  Workstation performed: [id number]     XR chest portable    Result Date: 3/13/2022  Narrative: CHEST INDICATION:   sob  COMPARISON:  Chest radiograph March 10, 2022  Correlation with chest CT March 9, 2022 EXAM PERFORMED/VIEWS:  XR CHEST PORTABLE FINDINGS: Cardiomediastinal silhouette appears unremarkable  Persistent mild prominence of the interstitial markings  Limited assessment for pleural effusions; no large pleural effusion  No pneumothorax  Partially imaged left shoulder reverse arthroplasty  Impression: Pulmonary vascular congestion is similar to prior study  Workstation performed: [id number]     XR chest portable    Result Date: 3/10/2022  Narrative: CHEST INDICATION:   sob  fu on status of fluid overload  COMPARISON:  3/9/2022  EXAM PERFORMED/VIEWS:  XR CHEST PORTABLE Images:  1 FINDINGS: Cardiac and mediastinal contours are stable and within normal limits  The aorta is calcified  There is increased pulmonary vascular congestion and development of small bilateral pleural effusions  No pneumothorax or focal airspace consolidation  Left shoulder reverse total arthroplasty partially seen  Impression: Increased pulmonary vascular congestion and development of small bilateral pleural effusions  Workstation performed: [id number]     XR chest 1 view portable    Result Date: 3/10/2022  Narrative: CHEST INDICATION:   shortness of breath  COMPARISON:  3/8/2022 ; 7/31/2019 EXAM PERFORMED/VIEWS:  XR CHEST PORTABLE FINDINGS:  Mild hypoventilation is present  Cardiomediastinal silhouette appears unremarkable   Subsegmental atelectasis at the bases is noted probably related to hypoventilation although some developing edema is difficult to exclude  No pneumothorax or pleural effusion  Left shoulder replacement is present  Impression: Hypoventilation with subsegmental atelectasis at the bases  Developing edema or inflammatory infiltrates are difficult to exclude  Please see subsequent CT for further report Workstation performed: [id number]     XR chest 1 view portable    Result Date: 3/9/2022  Narrative: CHEST INDICATION:   chest pain  COMPARISON:  7/31/2019 EXAM PERFORMED/VIEWS:  XR CHEST PORTABLE Single view FINDINGS: Cardiomediastinal silhouette appears unremarkable  The lungs are clear  No pneumothorax or pleural effusion  Reverse left shoulder arthroplasty has been performed since the prior study     Impression: No acute cardiopulmonary disease  Findings are stable except for left shoulder arthroplasty Workstation performed: [id number]       XR shoulder left 1 view    Result Date: 3/3/2022  Narrative: LEFT SHOULDER INDICATION:   post op  COMPARISON:  2/23/2022 VIEWS:  XR SHOULDER 1 VW LEFT FINDINGS: There is a reverse left shoulder arthroplasty  Components are in anatomic alignment  There are expected postoperative changes  Impression: Anatomic alignment of left shoulder reverse arthroplasty  Workstation performed: [id number]     CT chest wo contrast    Result Date: 3/17/2022  Narrative: CT CHEST WITHOUT IV CONTRAST INDICATION: "Bilateral infiltrates rule out pneumonia " Per my review of the medical record, the patient has acute on chronic kidney disease and CHF exacerbation  COMPARISON:  Chest radiograph from 3/17/2022 and chest CT from 3/9/2022  TECHNIQUE: Chest CT without intravenous contrast   Axial, sagittal, coronal 2D reformats and coronal MIPS from source data  Radiation dose length product (DLP):  941 09 mGy-cm   Radiation dose exposure minimized using iterative reconstruction and automated exposure control   FINDINGS: LUNGS:  Septal thickening with scattered alveolar nodularity due to mild alveolar and interstitial edema  Resolving nodular airspace opacity in the superior segment right lower lobe  Mild dependent atelectasis in both lower lobes  AIRWAYS: No significant filling defects  PLEURA:  Stable moderate effusions  HEART/GREAT VESSELS:  Normal heart size  Mild coronary artery calcification indicating atherosclerotic heart disease  MEDIASTINUM AND LUIGI:  Unremarkable  CHEST WALL AND LOWER NECK: Unremarkable  UPPER ABDOMEN:  Unremarkable  OSSEOUS STRUCTURES: Mild degenerative disease in the spine  Left shoulder arthroplasty  Impression: Nothing to suggest pneumonia  Septal thickening and alveolar nodules due to interstitial and alveolar edema with persistent moderate effusions  Workstation performed: [id number]     VAS upper limb venous duplex scan, unilateral/limited    Result Date: 3/10/2022  Narrative:  [facility] REPORT CLINICAL: Indications:  Patient presents with left upper extremity edema x  several days  S/P left shoulder surgery 3/1/2022  Operative History: Patient denies any cardiovascular surgeries  Risk Factors The patient has history of HTN, Diabetes (Yes), Hyperlipidemia, CKD and previous smoking (quit >10yrs ago)  FINDINGS:  Left          Thrombus  Ceph Mid Arm  Acute        CONCLUSION: Impression  RIGHT UPPER LIMB LIMITED: Evaluation shows no evidence of thrombus in the internal jugular vein, subclavian vein, and the brachiocephalic vein  LEFT UPPER LIMB: Evidence of superficial thrombophlebitis noted in the cephalic vein  No evidence of acute or chronic deep vein thrombosis  Doppler evaluation shows a normal response to augmentation maneuvers  Technically difficult/limited study due to immobile shoulder, pitting edema and poor visualization of deep system  SIGNATURE: Electronically Signed by: Jeanelle Lefort, MD on 2022-03-10 06:23:16 PM    US kidney and bladder    Result Date: 3/15/2022  Narrative: RENAL ULTRASOUND INDICATION:   SEBASTIAN   COMPARISON: 7/31/2019 TECHNIQUE:   Ultrasound of the retroperitoneum was performed with a curvilinear transducer utilizing volumetric sweeps and still imaging techniques  FINDINGS: KIDNEYS: Symmetric and normal size  Right kidney:  10 7 x 5 0 x 6 2 cm  Volume 176 6 mL Left kidney:  11 2 x 6 4 x 5 5 cm  Volume 206 5 mL Right kidney Normal echogenicity and contour  No mass is identified  No hydronephrosis  No shadowing calculi  No perinephric fluid collections  Left kidney Normal echogenicity and contour  No mass is identified  No hydronephrosis  No shadowing calculi  No perinephric fluid collections  URETERS: Nonvisualized  BLADDER: The bladder is underdistended and suboptimally evaluated  Impression: No hydronephrosis  Workstation performed: [id number]     CTA ED chest PE Study    Result Date: 3/9/2022  Narrative: CTA - CHEST WITH IV CONTRAST - PULMONARY ANGIOGRAM INDICATION:   Shortness of breath  COMPARISON: 3/9/2022  TECHNIQUE: CTA examination of the chest was performed using angiographic technique according to a protocol specifically tailored to evaluate for pulmonary embolism  Axial, sagittal, and coronal 2D reformatted images were created from the source data and  submitted for interpretation  In addition, coronal 3D MIP postprocessing was performed on the acquisition scanner  Radiation dose length product (DLP) for this visit:  684 94 mGy-cm   This examination, like all CT scans performed in the [facility], was performed utilizing techniques to minimize radiation dose exposure, including the use of iterative  reconstruction and automated exposure control  IV Contrast:  85 mL of iohexol (OMNIPAQUE)  FINDINGS: PULMONARY ARTERIAL TREE:  No filling defect in the visualized pulmonary arteries to suggest acute embolus  LUNGS:  Bibasilar atelectasis  Few small airspace opacities scattered throughout the right lung could represent mild aspiration  No pulmonary interstitial edema  There is no tracheal or endobronchial lesion   PLEURA: Small bilateral pleural effusions  HEART/GREAT VESSELS: Mild cardiomegaly  Aberrant right subclavian artery  No thoracic aortic aneurysm or dissection  MEDIASTINUM AND LUIGI:  No lymphadenopathy  CHEST WALL AND LOWER NECK:   Unremarkable  VISUALIZED STRUCTURES IN THE UPPER ABDOMEN:  Unremarkable  OSSEOUS STRUCTURES:  No acute fracture or destructive osseous lesion  Impression: 1  Small bilateral pleural effusions and bibasilar atelectasis  Few airspace opacities in the right lung could represent mild aspiration  2   No evidence of acute pulmonary embolus, thoracic aortic aneurysm or dissection  Workstation performed: [id number]     Echo complete w/ contrast if indicated    Result Date: 3/11/2022  Narrative: Provencalpetra Pedroza  Left Ventricle: Left ventricular cavity size is normal  Wall thickness is mildly increased  The left ventricular ejection fraction is 53% by biplane measurement  Systolic function is low normal  Wall motion is normal  There is borderline concentric hypertrophy    Right Ventricle: Systolic function is low normal  Normal tricuspid annular plane systolic excursion (TAPSE) > 1 7 cm    Study Details: This transthoracic echocardiogram was performed at bedside  This was a routine, inpatient study  Study quality was poor  This was a technically difficult study due to decreased penetration, restricted mobility and poor acoustic windows  A complete 2D, color flow Doppler, spectral Doppler, 2D, color flow Doppler and spectral Doppler transthoracic echocardiogram was performed  The apical, parasternal, subcostal and suprasternal views were obtained  0 6 ml/min of Definity ultrasound enhancing agent was used due to opacify the left ventricle  Guilherme Israel  Prior TTE study available for comparison  Prior study date: 5/29/2019  No significant changes noted compared to the prior study  Dee Aragon  Cardiology      "This note has been constructed using a voice recognition system  Therefore there may be syntax, spelling, and/or grammatical errors   Please call if you have any questions  "

## 2022-03-18 NOTE — BRIEF OP NOTE (RAD/CATH)
INTERVENTIONAL RADIOLOGY PROCEDURE NOTE    Date: 3/18/2022    Procedure: Bilateral thoracentesis    Preoperative diagnosis:   1  CHF (congestive heart failure) ([facility] )    2  Status post total replacement of left shoulder    3  Acute diastolic congestive heart failure ([facility] )    4  [facility]STIAN (acute kidney [facility]) ([facility] )         Postoperative diagnosis: Same  Surgeon: Jovi Echevarria MD     Assistant: None  No qualified resident was available  Blood loss: None    Specimens: Pleural fluid sent to the laboratory for further evaluation     Findings: Ultrasound-guided bilateral thoracentesis performed  Complications: None immediate      Anesthesia: local

## 2022-03-18 NOTE — ASSESSMENT & PLAN NOTE
Wt Readings from Last 3 Encounters:   03/18/22 127 kg (280 lb 6 8 oz)   03/08/22 135 kg (297 lb 9 9 oz)   03/03/22 127 kg (279 lb 15 8 oz)     · SOB POA, secondary to acute on chronic diastolic CHF  BNP 5992  CT shows small b/l plerual effusions   · Recent shoulder surgery, SEBASTIAN, not on diuretics at SNF (previously on torsemide 10mg/d)  · ECHO performed 3/10 shows EF 59%, systolic function low normal, borderline concentric hypertrophy as per Cardiology report  · Procalcitonin negative - would hold off abx for aspiration related air space opacities seen on CT    · CTA chest negative for pulmonary embolism   · Patient had developed some SOB on 3/13; repeat chest xray continues to show pulmonary congestion  · Patient was on Lasix 60 milligram IV q 12 hours with albumin which was later changed to torsemide 20 milligram p o  Daily on 3/16/22  · Continue 2 liters oxygen  Patient arranged for home oxygen  · Continue metoprolol 50 mg q 12 hours  · Continue intake and output, daily weights  · Patient developed shortness of breath with wheezing again this morning which improved slightly with nebulizer treatment  Repeat chest x-ray showing persistent pulmonary vascular congestion  CT scan of the chest was ordered which showed septal thickening and alveolar nodules due to interstitial and alveolar edema with persistent moderate effusions  · Patient received Lasix 80 milligram IV and torsemide 20 milligram on March 17, 2022  · Patient to be started on Lasix drip 10 milligram/hour for better diuresis    IR was consulted for bilateral thoracentesis

## 2022-03-18 NOTE — PROGRESS NOTES
Saul U  68   Progress Note Ana Hyman 1950, 73 y o  female MRN: [id number]  Unit/Bed#: [address] [place] [address] Encounter: [id number]  Primary Care Provider: Macria Chavez   Date and time admitted to hospital: 3/9/2022  4:26 PM    * Acute diastolic congestive heart failure [facility])  Assessment & Plan  Wt Readings from Last 3 Encounters:   03/18/22 127 kg (280 lb 6 8 oz)   03/08/22 135 kg (297 lb 9 9 oz)   03/03/22 127 kg (279 lb 15 8 oz)     · SOB POA, secondary to acute on chronic diastolic CHF  BNP 5992  CT shows small b/l plerual effusions   · Recent shoulder surgery, SEBASTIAN, not on diuretics at SNF (previously on torsemide 10mg/d)  · ECHO performed 3/10 shows EF 45%, systolic function low normal, borderline concentric hypertrophy as per Cardiology report  · Procalcitonin negative - would hold off abx for aspiration related air space opacities seen on CT    · CTA chest negative for pulmonary embolism   · Patient had developed some SOB on 3/13; repeat chest xray continues to show pulmonary congestion  · Patient was on Lasix 60 milligram IV q 12 hours with albumin which was later changed to torsemide 20 milligram p o  Daily on 3/16/22  · Continue 2 liters oxygen  Patient arranged for home oxygen  · Continue metoprolol 50 mg q 12 hours  · Continue intake and output, daily weights  · Patient developed shortness of breath with wheezing again this morning which improved slightly with nebulizer treatment  Repeat chest x-ray showing persistent pulmonary vascular congestion  CT scan of the chest was ordered which showed septal thickening and alveolar nodules due to interstitial and alveolar edema with persistent moderate effusions  · Patient received Lasix 80 milligram IV and torsemide 20 milligram on March 17, 2022  · Patient to be started on Lasix drip 10 milligram/hour for better diuresis    IR was consulted for bilateral thoracentesis    SEBASTIAN (acute kidney injury) [facility])  Assessment & Plan  · Creatinine continues to get worse and is up to 2 6  · Baseline Cr 1 4-1 6 with multiple episodes of SEBASTIAN  · Likely secondary to CRS in setting of CHF exacerbation complicated with contrast induced nephropathy  · Had CT contrast 3/9  · Nephrology consulted:  · Renal ultrasound showed no hydronephrosis  · Avoid nephrotoxins and hypotension  · Monitor BMP  · No urgent indication for dialysis  · Patient was on Lasix 60 milligram IV q 12 hours which was changed to torsemide 20 milligram p o  Daily   · Might need accept higher creatinine to maintain euvolemic state  · Patient was restarted back on IV Lasix which was changed to drip at 10 milligram/hour due to persistent fluid overload    Paroxysmal atrial fibrillation (HCC)  Assessment & Plan  · On arrival rapid a fib with RVR, responded to IV lopressor  · Continue metoprolol tartrate 50 mg PO BID at home   · DEMETRIS Vasc score 3, Dr Ying Soler cleared to resume Eliquis    · Continue Eliquis 5 mg p o  B i d  Anemia  Assessment & Plan  · Hgb stable, likely worsened secondary to post op loss   · Hemoglobin has been stable in the range of 8    Status post reverse total replacement of left shoulder  Assessment & Plan  · S/p fall resulting in Comminuted displaced fracture of the proximal humerus (2/23/22)    · S/p left shoulder reverse complete arthroplasty (3/1/22)   · Cleared from ortho standpoint for patient to start Eliquis   · EDUARDO venous duplex is negative for DVT  · PT / OT consults appreciated; recommending home with therapy      Diabetes mellitus, type 2 [facility])  Assessment & Plan  Lab Results   Component Value Date    HGBA1C 6 8 (H) 03/09/2022     Recent Labs     03/17/22  1625 03/17/22  2054 03/18/22  0708 03/18/22  1104   POCGLU 285* 296* 157* 249*     Blood Sugar Average: Last 72 hrs:  (P) 235 5   · Continue Lantus 24 units daily  · A1c indicated good control   · Continue Humalog sliding scale    Essential hypertension  Assessment & Plan  · On lopressor PO; increased to 50 mg p o  Q 12 hours during hospitalization  · Continue Norvasc          VTE Pharmacologic Prophylaxis: VTE Score: 17 High Risk (Score >/= 5) - Pharmacological DVT Prophylaxis Ordered: apixaban (Eliquis)  Sequential Compression Devices Ordered  Patient Centered Rounds: I performed bedside rounds with nursing staff today  Discussions with Specialists or Other Care Team Provider: Dr Aristeo Valdovinos    Education and Discussions with Family / Patient: Updated  (niece) via phone  Time Spent for Care: 45 minutes  More than 50% of total time spent on counseling and coordination of care as described above  Current Length of Stay: 9 day(s)  Current Patient Status: Inpatient   Certification Statement: The patient will continue to require additional inpatient hospital stay due to Acute CHF, bilateral pleural effusion, acute kidney injury  Discharge Plan: Anticipate discharge in 48-72 hrs to home  Code Status: Level 1 - Full Code    Subjective:   Patient is feeling better  Improved shortness of breath  Patient did have a bowel movement yesterday   Objective:     Vitals:   Temp (24hrs), Av 6 °F (36 4 °C), Min:97 4 °F (36 3 °C), Max:98 °F (36 7 °C)    Temp:  [97 4 °F (36 3 °C)-98 °F (36 7 °C)] 97 4 °F (36 3 °C)  HR:  [70-78] 70  Resp:  [16-19] 16  BP: (126-153)/(55-83) 139/64  SpO2:  [94 %-99 %] 96 %  Body mass index is 51 29 kg/m²  Input and Output Summary (last 24 hours):   No intake or output data in the 24 hours ending 22 1449    Physical Exam:   Physical Exam  Constitutional:       Appearance: Normal appearance  HENT:      Head: Normocephalic and atraumatic  Nose: Nose normal       Mouth/Throat:      Mouth: Mucous membranes are moist       Pharynx: Oropharynx is clear  Eyes:      Extraocular Movements: Extraocular movements intact  Pupils: Pupils are equal, round, and reactive to light  Cardiovascular:      Rate and Rhythm: Normal rate and regular rhythm  Pulmonary:      Effort: Pulmonary effort is normal       Breath sounds: Rales present  Comments: Decreased breath sounds bilaterally at bases  Abdominal:      General: Bowel sounds are normal  There is no distension  Palpations: Abdomen is soft  Tenderness: There is no abdominal tenderness  Musculoskeletal:         General: No swelling  Cervical back: Normal range of motion and neck supple  Right lower leg: Edema present  Left lower leg: Edema present  Skin:     General: Skin is warm and dry  Neurological:      General: No focal deficit present  Mental Status: She is alert  Additional Data:     Labs:  Results from last 7 days   Lab Units 03/18/22  0650 03/15/22  0503 03/15/22  0503   WBC Thousand/uL 6 99   < > 7 46   HEMOGLOBIN g/dL 8 8*   < > 8 2*   HEMATOCRIT % 30 7*   < > 28 8*   PLATELETS Thousands/uL 228   < > 214   NEUTROS PCT %  --   --  60   LYMPHS PCT %  --   --  18   MONOS PCT %  --   --  13*   EOS PCT %  --   --  7*    < > = values in this interval not displayed  Results from last 7 days   Lab Units 03/18/22  0650 03/16/22  0616 03/15/22  0503   SODIUM mmol/L 146*   < > 144   POTASSIUM mmol/L 5 1   < > 4 8   CHLORIDE mmol/L 101   < > 104   CO2 mmol/L 41*   < > 38*   BUN mg/dL 49*   < > 40*   CREATININE mg/dL 1 60*   < > 2 16*   ANION GAP mmol/L 4   < > 2*   CALCIUM mg/dL 7 9*   < > 7 6*   ALBUMIN g/dL  --   --  2 9*   TOTAL BILIRUBIN mg/dL  --   --  0 23   ALK PHOS U/L  --   --  85   ALT U/L  --   --  14   AST U/L  --   --  12   GLUCOSE RANDOM mg/dL 168*   < > 194*    < > = values in this interval not displayed           Results from last 7 days   Lab Units 03/18/22  1104 03/18/22  0708 03/17/22 2054 03/17/22  1625 03/17/22  1139 03/17/22  0719 03/16/22  1944 03/16/22  1555 03/16/22  1105 03/16/22  0734 03/15/22  2006 03/15/22  1558   POC GLUCOSE mg/dl 249* 157* 296* 285* 306* 194* 255* 203* 331* 175* 199* 193*               Lines/Drains:  Invasive Devices  Report    Peripheral Intravenous Line            Peripheral IV 03/13/22 Right Antecubital 4 days    Peripheral IV 03/17/22 Right Forearm <1 day          Drain            External Urinary Catheter 5 days                      Imaging: Reviewed radiology reports from this admission including: chest xray    Recent Cultures (last 7 days):         Last 24 Hours Medication List:   Current Facility-Administered Medications   Medication Dose Route Frequency Provider Last Rate    aluminum-magnesium hydroxide-simethicone  30 mL Oral Q6H PRN Lupe Spencer MD      amLODIPine  5 mg Oral Daily Lupe Spencer MD      ammonium lactate   Topical BID Lupe Spencer MD      apixaban  5 mg Oral BID Lupe Spencer MD      docusate sodium  100 mg Oral BID Ari Brush MD      furosemide  10 mg/hr Intravenous Continuous Singh Challenger, MD 10 mg/hr (03/18/22 1147)    insulin glargine  24 Units Subcutaneous HS Ari Brush MD      insulin lispro  1-5 Units Subcutaneous TID AC Lupe Spencer MD      insulin lispro  1-5 Units Subcutaneous HS Lupe Spencer MD      ipratropium-albuterol  3 mL Nebulization Q4H PRN Ari Brush MD      lidocaine  2 patch Topical Daily Lupe Spencer MD      loratadine  10 mg Oral Daily Lupe Spencer MD      magnesium hydroxide  30 mL Oral Daily PRN Lupe Spencer MD      metoprolol  5 mg Intravenous Q6H PRN Lupe Spencer MD      metoprolol tartrate  50 mg Oral Q12H Pili Felipe MD      montelukast  10 mg Oral HS Lupe Spencer MD      nitroglycerin  0 4 mg Sublingual Q5 Min PRN Lupe Spencer MD      nystatin   Topical BID Lupe Spencer MD      ondansetron  4 mg Intravenous Q6H PRN Lupe Spencer MD      pravastatin  80 mg Oral Daily With Xenia Carroll MD      pregabalin  100 mg Oral BID Lupe Spencer MD      traMADol  50 mg Oral Q6H PRN Adam Rm PA-C          Today, Patient Was Seen By: Ari Brush MD    **Please Note: This note may have been constructed using a voice recognition system  **

## 2022-03-18 NOTE — DISCHARGE INSTRUCTIONS
Thoracentesis   WHAT YOU NEED TO KNOW:   A thoracentesis is a procedure to remove extra fluid or air from between your lungs and your inner chest wall  Air or fluid buildup may make it hard for you to breathe  A thoracentesis allows your lungs to expand fully so you can breathe more easily  DISCHARGE INSTRUCTIONS:     Small amount of shoulder pain and bloody sputum is normal after a Thoracentesis  Rest:  Rest when you feel it is needed  Slowly start to do more each day  Return to your daily activities as directed  Resume your normal diet  Small sips of flat soda will help mild nausea  Do not smoke: If you smoke, it is never too late to quit  Ask for information about how to stop smoking if you need help  Contact Interventional Radiology at [phone] Kris PATIENTS: Contact Interventional Radiology at [phone]) Stephanie Burleson PATIENTS: Contact Interventional Radiology at [phone]) if:   · You have a fever  · Your puncture site is red, warm, swollen, or draining pus  · You have questions or concerns about your procedure, medicine, or care  Seek care immediately or call 911 if:   · Severe chest pain with inspiration and shortness of breath    · Large amounts of blood in your sputum    Follow up with your healthcare provider as directed

## 2022-03-18 NOTE — ASSESSMENT & PLAN NOTE
· Creatinine continues to get worse and is up to 2 6  · Baseline Cr 1 4-1 6 with multiple episodes of SEBASTIAN  · Likely secondary to CRS in setting of CHF exacerbation complicated with contrast induced nephropathy  · Had CT contrast 3/9  · Nephrology consulted:  · Renal ultrasound showed no hydronephrosis  · Avoid nephrotoxins and hypotension  · Monitor BMP  · No urgent indication for dialysis  · Patient was on Lasix 60 milligram IV q 12 hours which was changed to torsemide 20 milligram p o  Daily     · Might need accept higher creatinine to maintain euvolemic state  · Patient was restarted back on IV Lasix which was changed to drip at 10 milligram/hour due to persistent fluid overload

## 2022-03-18 NOTE — CASE MANAGEMENT
Case Management Discharge Planning Note    Patient name Michael Galvan  Location [address] [facility] 401/[address] [place] 12-* MRN [id number]  : 1950 Date 3/18/2022       Current Admission Date: 3/9/2022  Current Admission Diagnosis:Acute diastolic congestive heart failure [facility])   Patient Active Problem List    Diagnosis Date Noted    Acute diastolic congestive heart failure ([facility] ) 03/10/2022    Shortness of breath 2022    Status post reverse total replacement of left shoulder 2022    Constipation 2022    Closed 4-part fracture of proximal humerus, left, initial encounter 2022    PONV (postoperative nausea and vomiting) 2022    SEBASTIAN (acute kidney injury) ([place] [address] ) 2022    Diabetes mellitus, type 2 ([place] [address] ) 2022    Gastroesophageal reflux disease 2022    Nephrolithiasis 2022    Arthritis 2022    S/P total knee replacement, right 2022    On continuous oral anticoagulation - eliquis 2022    Humeral head fracture 2022    Essential hypertension 2019    Anemia 2019    Mixed hyperlipidemia 2019    Paroxysmal atrial fibrillation ([place] [address] ) 2019    Lower leg edema 2019    Asthma 2018    Morbid obesity with BMI of 45 0-49 9, adult ([facility] ) 2018      LOS (days): 9  Geometric Mean LOS (GMLOS) (days): 3 80  Days to GMLOS:-5     OBJECTIVE:  Risk of Unplanned Readmission Score: 28   Current admission status: Inpatient   Preferred Pharmacy:   [address] [place] [address],[address] , [address] [place] [id number]  Phone: [phone] Fax: [phone]    Primary Care Provider: Kasia Crawley  Primary Insurance: MEDICARE  Secondary Insurance: ISABEL    DISCHARGE DETAILS:  Discharge planning discussed with[de-identified] Patient  Freedom of Choice: Yes  Comments - Freedom of Choice: [facility] (accepted)  CM contacted family/caregiver?: Yes  Were Treatment Team discharge recommendations reviewed with patient/caregiver?: Yes  Did patient/caregiver verbalize understanding of patient care needs?: Yes  Were patient/caregiver advised of the risks associated with not following Treatment Team discharge recommendations?: Yes    Contacts  Patient Contacts: Sade Andrade  Relationship to Patient[de-identified] Family (niece)  Contact Method: Phone  Phone Number: [phone]  Reason/Outcome: Emergency Contact     IMM Given (Date):: 03/18/22  IMM Given to[de-identified] Patient (IMM was discussed with patient and she states understanding )        CM met with patient and discussed dc planning and confirmed patient will be discharged to her [facility] home and [facility]  Discharge was anticipated last weekend and O2 was ordered and delivered to patients bedside and is still there

## 2022-03-19 LAB
ANION GAP SERPL CALCULATED.3IONS-SCNC: 3 MMOL/L (ref 4–13)
BASE EX.OXY STD BLDV CALC-SCNC: 95.3 % (ref 60–80)
BASE EXCESS BLDV CALC-SCNC: 19.5 MMOL/L
BUN SERPL-MCNC: 51 MG/DL (ref 5–25)
CALCIUM SERPL-MCNC: 8 MG/DL (ref 8.3–10.1)
CHLORIDE SERPL-SCNC: 98 MMOL/L (ref 100–108)
CO2 SERPL-SCNC: 44 MMOL/L (ref 21–32)
CREAT SERPL-MCNC: 1.65 MG/DL (ref 0.6–1.3)
GFR SERPL CREATININE-BSD FRML MDRD: 31 ML/MIN/1.73SQ M
GLUCOSE SERPL-MCNC: 183 MG/DL (ref 65–140)
GLUCOSE SERPL-MCNC: 188 MG/DL (ref 65–140)
GLUCOSE SERPL-MCNC: 234 MG/DL (ref 65–140)
GLUCOSE SERPL-MCNC: 294 MG/DL (ref 65–140)
GLUCOSE SERPL-MCNC: 380 MG/DL (ref 65–140)
HCO3 BLDV-SCNC: 47.6 MMOL/L (ref 24–30)
O2 CT BLDV-SCNC: 13.4 ML/DL
PCO2 BLDV: 80.3 MM HG (ref 42–50)
PH BLDV: 7.39 [PH] (ref 7.3–7.4)
PO2 BLDV: 91.4 MM HG (ref 35–45)
POTASSIUM SERPL-SCNC: 4.5 MMOL/L (ref 3.5–5.3)
SODIUM SERPL-SCNC: 145 MMOL/L (ref 136–145)

## 2022-03-19 PROCEDURE — 99232 SBSQ HOSP IP/OBS MODERATE 35: CPT | Performed by: INTERNAL MEDICINE

## 2022-03-19 PROCEDURE — 82805 BLOOD GASES W/O2 SATURATION: CPT | Performed by: STUDENT IN AN ORGANIZED HEALTH CARE EDUCATION/TRAINING PROGRAM

## 2022-03-19 PROCEDURE — 80048 BASIC METABOLIC PNL TOTAL CA: CPT | Performed by: INTERNAL MEDICINE

## 2022-03-19 PROCEDURE — 99232 SBSQ HOSP IP/OBS MODERATE 35: CPT | Performed by: STUDENT IN AN ORGANIZED HEALTH CARE EDUCATION/TRAINING PROGRAM

## 2022-03-19 PROCEDURE — 82948 REAGENT STRIP/BLOOD GLUCOSE: CPT

## 2022-03-19 RX ORDER — INSULIN GLARGINE 100 [IU]/ML
28 INJECTION, SOLUTION SUBCUTANEOUS
Status: DISCONTINUED | OUTPATIENT
Start: 2022-03-19 | End: 2022-03-20

## 2022-03-19 RX ADMIN — NYSTATIN: 100000 POWDER TOPICAL at 19:03

## 2022-03-19 RX ADMIN — METOPROLOL TARTRATE 50 MG: 50 TABLET, FILM COATED ORAL at 09:37

## 2022-03-19 RX ADMIN — Medication: at 19:04

## 2022-03-19 RX ADMIN — INSULIN LISPRO 4 UNITS: 100 INJECTION, SOLUTION INTRAVENOUS; SUBCUTANEOUS at 21:15

## 2022-03-19 RX ADMIN — METOPROLOL TARTRATE 50 MG: 50 TABLET, FILM COATED ORAL at 21:15

## 2022-03-19 RX ADMIN — Medication 10 MG/HR: at 03:47

## 2022-03-19 RX ADMIN — DOCUSATE SODIUM 100 MG: 100 CAPSULE ORAL at 17:25

## 2022-03-19 RX ADMIN — PREGABALIN 100 MG: 100 CAPSULE ORAL at 09:39

## 2022-03-19 RX ADMIN — LORATADINE 10 MG: 10 TABLET ORAL at 09:37

## 2022-03-19 RX ADMIN — INSULIN LISPRO 3 UNITS: 100 INJECTION, SOLUTION INTRAVENOUS; SUBCUTANEOUS at 16:58

## 2022-03-19 RX ADMIN — PREGABALIN 100 MG: 100 CAPSULE ORAL at 17:25

## 2022-03-19 RX ADMIN — MONTELUKAST 10 MG: 10 TABLET, FILM COATED ORAL at 21:15

## 2022-03-19 RX ADMIN — DOCUSATE SODIUM 100 MG: 100 CAPSULE ORAL at 09:37

## 2022-03-19 RX ADMIN — INSULIN GLARGINE 28 UNITS: 100 INJECTION, SOLUTION SUBCUTANEOUS at 21:15

## 2022-03-19 RX ADMIN — LIDOCAINE 5% 2 PATCH: 700 PATCH TOPICAL at 09:39

## 2022-03-19 RX ADMIN — APIXABAN 5 MG: 5 TABLET, FILM COATED ORAL at 17:25

## 2022-03-19 RX ADMIN — AMLODIPINE BESYLATE 5 MG: 5 TABLET ORAL at 09:37

## 2022-03-19 RX ADMIN — PRAVASTATIN SODIUM 80 MG: 80 TABLET ORAL at 16:58

## 2022-03-19 RX ADMIN — INSULIN LISPRO 2 UNITS: 100 INJECTION, SOLUTION INTRAVENOUS; SUBCUTANEOUS at 12:04

## 2022-03-19 RX ADMIN — Medication: at 12:04

## 2022-03-19 RX ADMIN — APIXABAN 5 MG: 5 TABLET, FILM COATED ORAL at 09:37

## 2022-03-19 RX ADMIN — INSULIN LISPRO 1 UNITS: 100 INJECTION, SOLUTION INTRAVENOUS; SUBCUTANEOUS at 09:37

## 2022-03-19 RX ADMIN — NYSTATIN: 100000 POWDER TOPICAL at 09:50

## 2022-03-19 NOTE — PLAN OF CARE
Problem: Nutrition/Hydration-ADULT  Goal: Nutrient/Hydration intake appropriate for improving, restoring or maintaining nutritional needs  Description: Monitor and assess patient's nutrition/hydration status for malnutrition  Collaborate with interdisciplinary team and initiate plan and interventions as ordered  Monitor patient's weight and dietary intake as ordered or per policy  Utilize nutrition screening tool and intervene as necessary  Determine patient's food preferences and provide high-protein, high-caloric foods as appropriate       INTERVENTIONS:  - Monitor oral intake, urinary output, labs, and treatment plans  - Assess nutrition and hydration status and recommend course of action  - Evaluate amount of meals eaten  - Assist patient with eating if necessary   - Allow adequate time for meals  - Recommend/ encourage appropriate diets, oral nutritional supplements, and vitamin/mineral supplements  - Order, calculate, and assess calorie counts as needed  - Recommend, monitor, and adjust tube feedings and TPN/PPN based on assessed needs  - Assess need for intravenous fluids  - Provide specific nutrition/hydration education as appropriate  - Include patient/family/caregiver in decisions related to nutrition  Outcome: Progressing     Problem: Prexisting or High Potential for Compromised Skin Integrity  Goal: Skin integrity is maintained or improved  Description: INTERVENTIONS:  - Identify patients at risk for skin breakdown  - Assess and monitor skin integrity  - Assess and monitor nutrition and hydration status  - Monitor labs   - Turn and reposition patient  - Assist with mobility/ambulation  - Relieve pressure over bony prominences  - Avoid friction and shearing  - Provide appropriate hygiene as needed including keeping skin clean and dry  - Evaluate need for skin moisturizer/barrier cream  - Collaborate with interdisciplinary team   - Patient/family teaching    Outcome: Progressing     Problem: Potential for Falls  Goal: Patient will remain free of falls  Description: INTERVENTIONS:  - Educate patient/family on patient safety including physical limitations  - Instruct patient to call for assistance with activity   - Consult OT/PT to assist with strengthening/mobility   - Keep Call bell within reach  - Keep bed low and locked with side rails adjusted as appropriate  - Keep care items and personal belongings within reach  - Initiate and maintain comfort rounds  - Make Fall Risk Sign visible to staff    - Initiate/Maintain alarm  - Obtain necessary fall risk management equipment:   - Apply yellow socks and bracelet for high fall risk patients  - Consider moving patient to room near nurses station  Outcome: Progressing     Problem: MOBILITY - ADULT  Goal: Maintain or return to baseline ADL function  Description: INTERVENTIONS:  - Educate patient/family on patient safety including physical limitations  - Instruct patient to call for assistance with activity   - Consult OT/PT to assist with strengthening/mobility   - Keep Call bell within reach  - Keep bed low and locked with side rails adjusted as appropriate  - Keep care items and personal belongings within reach  - Initiate and maintain comfort rounds  - Make Fall Risk Sign visible to staff    - Apply yellow socks and bracelet for high fall risk patients  - Consider moving patient to room near nurses station  Outcome: Progressing  Goal: Maintains/Returns to pre admission functional level  Description: INTERVENTIONS:  - Perform BMAT or MOVE assessment daily    - Set and communicate daily mobility goal to care team and patient/family/caregiver  - Collaborate with rehabilitation services on mobility goals if consulted  - Perform Range of Motion 4 times a day  - Reposition patient every 2 hours    - Dangle patient 3 times a day  - Stand patient 3 times a day  - Ambulate patient 3 times a day  - Out of bed to chair 3 times a day   - Out of bed for meals 3 times a day  - Out of bed for toileting  - Record patient progress and toleration of activity level   Outcome: Progressing     Problem: PAIN - ADULT  Goal: Verbalizes/displays adequate comfort level or baseline comfort level  Description: Interventions:  - Encourage patient to monitor pain and request assistance  - Assess pain using appropriate pain scale  - Administer analgesics based on type and severity of pain and evaluate response  - Implement non-pharmacological measures as appropriate and evaluate response  - Consider cultural and social influences on pain and pain management  - Notify physician/advanced practitioner if interventions unsuccessful or patient reports new pain  Outcome: Progressing     Problem: INFECTION - ADULT  Goal: Absence or prevention of progression during hospitalization  Description: INTERVENTIONS:  - Assess and monitor for signs and symptoms of infection  - Monitor lab/diagnostic results  - Monitor all insertion sites, i e  indwelling lines, tubes, and drains  - Monitor endotracheal if appropriate and nasal secretions for changes in amount and color  - [place] appropriate cooling/warming therapies per order  - Administer medications as ordered  - Instruct and encourage patient and family to use good hand hygiene technique  - Identify and instruct in appropriate isolation precautions for identified infection/condition  Outcome: Progressing     Problem: SAFETY ADULT  Goal: Patient will remain free of falls  Description: INTERVENTIONS:  - Educate patient/family on patient safety including physical limitations  - Instruct patient to call for assistance with activity   - Consult OT/PT to assist with strengthening/mobility   - Keep Call bell within reach  - Keep bed low and locked with side rails adjusted as appropriate  - Keep care items and personal belongings within reach  - Initiate and maintain comfort rounds  - Make Fall Risk Sign visible to staff    - Initiate/Maintain alarm  - Obtain necessary fall risk management equipment:   - Apply yellow socks and bracelet for high fall risk patients  - Consider moving patient to room near nurses station  Outcome: Progressing  Goal: Maintain or return to baseline ADL function  Description: INTERVENTIONS:  - Educate patient/family on patient safety including physical limitations  - Instruct patient to call for assistance with activity   - Consult OT/PT to assist with strengthening/mobility   - Keep Call bell within reach  - Keep bed low and locked with side rails adjusted as appropriate  - Keep care items and personal belongings within reach  - Initiate and maintain comfort rounds  - Make Fall Risk Sign visible to staff    - Apply yellow socks and bracelet for high fall risk patients  - Consider moving patient to room near nurses station  Outcome: Progressing  Goal: Maintains/Returns to pre admission functional level  Description: INTERVENTIONS:  - Perform BMAT or MOVE assessment daily    - Set and communicate daily mobility goal to care team and patient/family/caregiver  - Collaborate with rehabilitation services on mobility goals if consulted  - Perform Range of Motion 4 times a day  - Reposition patient every 2 hours    - Dangle patient 3 times a day  - Stand patient 3 times a day  - Ambulate patient 3 times a day  - Out of bed to chair 3 times a day   - Out of bed for meals 3 times a day  - Out of bed for toileting  - Record patient progress and toleration of activity level   Outcome: Progressing     Problem: DISCHARGE PLANNING  Goal: Discharge to home or other facility with appropriate resources  Description: INTERVENTIONS:  - Identify barriers to discharge w/patient and caregiver  - Arrange for needed discharge resources and transportation as appropriate  - Identify discharge learning needs (meds, wound care, etc )  - Arrange for interpretive services to assist at discharge as needed  - Refer to Case Management Department for coordinating discharge planning if the patient needs post-hospital services based on physician/advanced practitioner order or complex needs related to functional status, cognitive ability, or social support system  Outcome: Progressing     Problem: Knowledge Deficit  Goal: Patient/family/caregiver demonstrates understanding of disease process, treatment plan, medications, and discharge instructions  Description: Complete learning assessment and assess knowledge base    Interventions:  - Provide teaching at level of understanding  - Provide teaching via preferred learning methods  Outcome: Progressing     Problem: CARDIOVASCULAR - ADULT  Goal: Maintains optimal cardiac output and hemodynamic stability  Description: INTERVENTIONS:  - Monitor I/O, vital signs and rhythm  - Monitor for S/S and trends of decreased cardiac output  - Assess quality of pulses, skin color and temperature  - Instruct patient to report change in severity of symptoms  Outcome: Progressing  Goal: Absence of cardiac dysrhythmias or at baseline rhythm  Description: INTERVENTIONS:  - Continuous cardiac monitoring, vital signs, obtain 12 lead EKG if ordered  - Administer antiarrhythmic and heart rate control medications as ordered  - Monitor electrolytes and administer replacement therapy as ordered  Outcome: Progressing     Problem: RESPIRATORY - ADULT  Goal: Achieves optimal ventilation and oxygenation  Description: INTERVENTIONS:  - Assess for changes in respiratory status  - Assess for changes in mentation and behavior  - Position to facilitate oxygenation and minimize respiratory effort  - Oxygen administered by appropriate delivery if ordered  - Initiate smoking cessation education as indicated  - Encourage broncho-pulmonary hygiene including cough, deep breathe, Incentive Spirometry  - Assess the need for suctioning and aspirate as needed  - Assess and instruct to report SOB or any respiratory difficulty  - Respiratory Therapy support as indicated  Outcome: Progressing

## 2022-03-19 NOTE — ASSESSMENT & PLAN NOTE
· Creatinine continues to get worse and is up to 2 6  · Baseline Cr 1 4-1 6 with multiple episodes of SEBASTIAN  · Likely secondary to CRS in setting of CHF exacerbation complicated with contrast induced nephropathy  · Had CT contrast 3/9  · Nephrology consulted:  · Renal ultrasound showed no hydronephrosis  · Avoid nephrotoxins and hypotension  · Monitor BMP  · No urgent indication for dialysis  · Patient was on Lasix 60 milligram IV q 12 hours which was changed to torsemide 20 milligram p o  Daily     · Might need accept higher creatinine to maintain euvolemic state  · Patient was started on Lasix drip at 10 milligram/hour with stable creatinine

## 2022-03-19 NOTE — PROGRESS NOTES
Progress Note - Cardiology   Aneesh Tate 73 y o  female MRN: [id number]  Unit/Bed#: [address] [place] [address] Encounter: [id number]  03/19/22          Assessment:  1  Acute on chronic diastolic congestive heart failure - patient started on IV lasix gtt  She had bilateral thoracentesis yesterday  - Continue diuresis  - monitor urine output  - repeat standing weight  2  Acute kidney injury - patient with significant improvement in creatinine - returned to baseline with diuresis  - nephrology has been consulted  3  Paroxysmal atrial fibrillation-currently in sinus rhythm  - continue Eliquis 5 mg b i d  Marganne Mik - continue Lopressor 50 mg b i d  4  Hypertension - BP has been stable  5  S P Left shoulder replacement on 3/1/22  Plan:  As above      Subjective: Aneesh Tate feels shortness of breath is improving  She was started on a lasix gtt by nephrology  Meds/Allergies   all current active meds have been reviewed  Allergies   Allergen Reactions    Penicillins Hives    Moxifloxacin Other (See Comments)     unknown    Percocet [Oxycodone-Acetaminophen] Other (See Comments)     unknown    Zinc Acetate Other (See Comments)     unknown    Nuts - Food Allergy Other (See Comments)    Asa [Aspirin] GI Intolerance    Indocin [Indomethacin] Other (See Comments)     Made patient "loopy"    Other Other (See Comments)     unknown       Objective   Vitals: Blood pressure 145/73, pulse 69, temperature (!) 97 3 °F (36 3 °C), temperature source Oral, resp  rate 20, height 5' 2" (1 575 m), weight 127 kg (280 lb 6 8 oz), SpO2 95 %, not currently breastfeeding ,         Intake/Output Summary (Last 24 hours) at 3/19/2022 1706  Last data filed at 3/19/2022 1555  Gross per 24 hour   Intake 100 ml   Output 3160 ml   Net -3060 ml       Physical Exam   Constitutional: She appears healthy  No distress  Eyes: Pupils are equal, round, and reactive to light  Conjunctivae are normal    Neck: No JVD present  Cardiovascular: Normal rate, regular rhythm and normal heart sounds  Exam reveals no gallop and no friction rub  No murmur heard  Pulmonary/Chest: Effort normal  She has no rales  She has scattered wheezes  Musculoskeletal:         General: No tenderness, deformity or edema  Cervical back: Normal range of motion and neck supple  Neurological: She is alert and oriented to person, place, and time  Skin: Skin is warm and dry         Lab Results:     Troponins:       Recent Results (from the past 24 hour(s))   Fingerstick Glucose (POCT)    Collection Time: 03/18/22  8:37 PM   Result Value Ref Range    POC Glucose 336 (H) 65 - 140 mg/dl   Fingerstick Glucose (POCT)    Collection Time: 03/19/22  7:06 AM   Result Value Ref Range    POC Glucose 188 (H) 65 - 140 mg/dl   Blood gas, venous    Collection Time: 03/19/22  7:22 AM   Result Value Ref Range    pH, [place] 7 391 7 300 - 7 400    pCO2, [place] 80 3 (H) 42 0 - 50 0 mm Hg    pO2, [place] 91 4 (H) 35 0 - 45 0 mm Hg    HCO3, [place] 47 6 (H) 24 - 30 mmol/L    Base Excess, Pillo 19 5 mmol/L    O2 Content, P[place] 13 4 ml/dL    O2 HGB, VENOUS 95 3 (H) 60 0 - 80 0 %   Basic metabolic panel    Collection Time: 03/19/22  7:22 AM   Result Value Ref Range    Sodium 145 136 - 145 mmol/L    Potassium 4 5 3 5 - 5 3 mmol/L    Chloride 98 (L) 100 - 108 mmol/L    CO2 44 (H) 21 - 32 mmol/L    ANION GAP 3 (L) 4 - 13 mmol/L    BUN 51 (H) 5 - 25 mg/dL    Creatinine 1 65 (H) 0 60 - 1 30 mg/dL    Glucose 183 (H) 65 - 140 mg/dL    Calcium 8 0 (L) 8 3 - 10 1 mg/dL    eGFR 31 ml/min/1 73sq m   Fingerstick Glucose (POCT)    Collection Time: 03/19/22 11:16 AM   Result Value Ref Range    POC Glucose 234 (H) 65 - 140 mg/dl   Fingerstick Glucose (POCT)    Collection Time: 03/19/22  4:17 PM   Result Value Ref Range    POC Glucose 294 (H) 65 - 140 mg/dl          Cardiac testing: Reviewed - See Above      Imaging: I have personally reviewed pertinent films in PACS - CT chest with pleural effusions and vascular congestion

## 2022-03-19 NOTE — ASSESSMENT & PLAN NOTE
Lab Results   Component Value Date    HGBA1C 6 8 (H) 03/09/2022     Recent Labs     03/18/22  1637 03/18/22 2037 03/19/22  0706 03/19/22  1116   POCGLU 234* 336* 188* 234*     Blood Sugar Average: Last 72 hrs:  (P) 245 [id number]   · Continue Lantus 24 units daily  · A1c indicated good control   · Continue Humalog sliding scale

## 2022-03-19 NOTE — ASSESSMENT & PLAN NOTE
Wt Readings from Last 3 Encounters:   03/18/22 127 kg (280 lb 6 8 oz)   03/08/22 135 kg (297 lb 9 9 oz)   03/03/22 127 kg (279 lb 15 8 oz)     · SOB POA, secondary to acute on chronic diastolic CHF  BNP 5992  CT shows small b/l plerual effusions   · Recent shoulder surgery, SEBASTIAN, not on diuretics at SNF (previously on torsemide 10mg/d)  · ECHO performed 3/10 shows EF 16%, systolic function low normal, borderline concentric hypertrophy as per Cardiology report  · Procalcitonin negative - would hold off abx for aspiration related air space opacities seen on CT    · CTA chest negative for pulmonary embolism   · Patient had developed some SOB on 3/13; repeat chest xray continues to show pulmonary congestion  · Patient was on Lasix 60 milligram IV q 12 hours with albumin which was later changed to torsemide 20 milligram p o  Daily on 3/16/22  · Continue 2 liters oxygen  Patient arranged for home oxygen  · Continue metoprolol 50 mg q 12 hours  · Continue intake and output, daily weights  · Patient developed shortness of breath with wheezing again this morning which improved slightly with nebulizer treatment  Repeat chest x-ray showing persistent pulmonary vascular congestion  CT scan of the chest was ordered which showed septal thickening and alveolar nodules due to interstitial and alveolar edema with persistent moderate effusions  · Patient received Lasix 80 milligram IV and torsemide 20 milligram on March 17, 2022  · Patient has been started on Lasix drip at 10 milligram/hour on March 18, 2022  Continue Lasix drip at 10 milligram/hour  · Patient underwent thoracentesis with removal of 500 milliliters of pleural fluid from the right and 300 mL from the left  · Will repeat chest x-ray in a m   To follow-up on CHF

## 2022-03-19 NOTE — PROGRESS NOTES
[address] 47  Progress Note Becky Monday 1950, 73 y o  female MRN: [id number]  Unit/Bed#: [address] [place] [address] Encounter: [id number]  Primary Care Provider: Dario Ross   Date and time admitted to hospital: 3/9/2022  4:26 PM    * Acute diastolic congestive heart failure [facility])  Assessment & Plan  Wt Readings from Last 3 Encounters:   03/18/22 127 kg (280 lb 6 8 oz)   03/08/22 135 kg (297 lb 9 9 oz)   03/03/22 127 kg (279 lb 15 8 oz)     · SOB POA, secondary to acute on chronic diastolic CHF  BNP 5992  CT shows small b/l plerual effusions   · Recent shoulder surgery, SEBASTIAN, not on diuretics at SNF (previously on torsemide 10mg/d)  · ECHO performed 3/10 shows EF 70%, systolic function low normal, borderline concentric hypertrophy as per Cardiology report  · Procalcitonin negative - would hold off abx for aspiration related air space opacities seen on CT    · CTA chest negative for pulmonary embolism   · Patient had developed some SOB on 3/13; repeat chest xray continues to show pulmonary congestion  · Patient was on Lasix 60 milligram IV q 12 hours with albumin which was later changed to torsemide 20 milligram p o  Daily on 3/16/22  · Continue 2 liters oxygen  Patient arranged for home oxygen  · Continue metoprolol 50 mg q 12 hours  · Continue intake and output, daily weights  · Patient developed shortness of breath with wheezing again this morning which improved slightly with nebulizer treatment  Repeat chest x-ray showing persistent pulmonary vascular congestion  CT scan of the chest was ordered which showed septal thickening and alveolar nodules due to interstitial and alveolar edema with persistent moderate effusions  · Patient received Lasix 80 milligram IV and torsemide 20 milligram on March 17, 2022  · Patient has been started on Lasix drip at 10 milligram/hour on March 18, 2022   Continue Lasix drip at 10 milligram/hour  · Patient underwent thoracentesis with removal of 500 milliliters of pleural fluid from the right and 300 mL from the left  · Will repeat chest x-ray in a m  To follow-up on CHF    SEBASTIAN (acute kidney injury) ([facility] [address] )  Assessment & Plan  · Creatinine continues to get worse and is up to 2 6  · Baseline Cr 1 4-1 6 with multiple episodes of SEBASTIAN  · Likely secondary to CRS in setting of CHF exacerbation complicated with contrast induced nephropathy  · Had CT contrast 3/9  · Nephrology consulted:  · Renal ultrasound showed no hydronephrosis  · Avoid nephrotoxins and hypotension  · Monitor BMP  · No urgent indication for dialysis  · Patient was on Lasix 60 milligram IV q 12 hours which was changed to torsemide 20 milligram p o  Daily   · Might need accept higher creatinine to maintain euvolemic state  · Patient was started on Lasix drip at 10 milligram/hour with stable creatinine    Paroxysmal atrial fibrillation (HCC)  Assessment & Plan  · On arrival rapid a fib with RVR, responded to IV lopressor  · Continue metoprolol tartrate 50 mg PO BID at home   · DEMETRIS Vasc score 3, Dr Alyssa Roth cleared to resume Eliquis    · Continue Eliquis 5 mg p o  B i d  Anemia  Assessment & Plan  · Hgb stable, likely worsened secondary to post op loss   · Hemoglobin has been stable in the range of 8    Morbid obesity with BMI of 45 0-49 9, adult (HCC)  Assessment & Plan  · Counseled weight loss   · Sleep apnea, refusing CPAP     Status post reverse total replacement of left shoulder  Assessment & Plan  · S/p fall resulting in Comminuted displaced fracture of the proximal humerus (2/23/22)    · S/p left shoulder reverse complete arthroplasty (3/1/22)   · Cleared from ortho standpoint for patient to start Eliquis   · LUE venous duplex is negative for DVT  · PT / OT consults appreciated; recommending home with therapy      Diabetes mellitus, type 2 [facility])  Assessment & Plan  Lab Results   Component Value Date    HGBA1C 6 8 (H) 03/09/2022     Recent Labs     03/18/22  1637 03/18/22 2037 03/19/22  3943 22  1116   POCGLU 234* 336* 188* 234*     Blood Sugar Average: Last 72 hrs:  (P) 245 [id number]   · Continue Lantus 24 units daily  · A1c indicated good control   · Continue Humalog sliding scale    Essential hypertension  Assessment & Plan  · On lopressor PO; increased to 50 mg p o  Q 12 hours during hospitalization  · Continue Norvasc    Mixed hyperlipidemia  Assessment & Plan  · On rosuvastatin  · Heart healthy diet          VTE Pharmacologic Prophylaxis: VTE Score: 17 High Risk (Score >/= 5) - Pharmacological DVT Prophylaxis Ordered: apixaban (Eliquis)  Sequential Compression Devices Ordered  Patient Centered Rounds: I performed bedside rounds with nursing staff today  Discussions with Specialists or Other Care Team Provider: Dr Anali Wang and Discussions with Family / Patient: yes    Time Spent for Care: 45 minutes  More than 50% of total time spent on counseling and coordination of care as described above  Current Length of Stay: 10 day(s)  Current Patient Status: Inpatient   Certification Statement: The patient will continue to require additional inpatient hospital stay due to Acute CHF  Discharge Plan: Anticipate discharge in 48-72 hrs to home with home services  Code Status: Level 1 - Full Code    Subjective:   Patient has improved shortness of breath  Denies any chest pain, palpitations    Objective:     Vitals:   Temp (24hrs), Av 6 °F (36 4 °C), Min:97 3 °F (36 3 °C), Max:97 8 °F (36 6 °C)    Temp:  [97 3 °F (36 3 °C)-97 8 °F (36 6 °C)] 97 3 °F (36 3 °C)  HR:  [61-76] 69  Resp:  [14-20] 20  BP: (129-145)/(56-73) 145/73  SpO2:  [92 %-100 %] 95 %  Body mass index is 51 29 kg/m²  Input and Output Summary (last 24 hours): Intake/Output Summary (Last 24 hours) at 3/19/2022 1640  Last data filed at 3/19/2022 1555  Gross per 24 hour   Intake 100 ml   Output 3160 ml   Net -3060 ml       Physical Exam:   Physical Exam  Constitutional:       Appearance: Normal appearance  HENT:      Head: Normocephalic and atraumatic  Nose: Nose normal       Mouth/Throat:      Mouth: Mucous membranes are moist       Pharynx: Oropharynx is clear  Eyes:      Extraocular Movements: Extraocular movements intact  Pupils: Pupils are equal, round, and reactive to light  Cardiovascular:      Rate and Rhythm: Normal rate and regular rhythm  Pulmonary:      Effort: Pulmonary effort is normal       Breath sounds: Rales present  Comments: Few basilar crackles  Abdominal:      General: Bowel sounds are normal  There is no distension  Palpations: Abdomen is soft  Tenderness: There is no abdominal tenderness  Musculoskeletal:         General: No swelling  Cervical back: Normal range of motion and neck supple  Right lower leg: Edema present  Left lower leg: Edema present  Skin:     General: Skin is warm and dry  Neurological:      General: No focal deficit present  Mental Status: She is alert  Additional Data:     Labs:  Results from last 7 days   Lab Units 03/18/22  0650 03/15/22  0503 03/15/22  0503   WBC Thousand/uL 6 99   < > 7 46   HEMOGLOBIN g/dL 8 8*   < > 8 2*   HEMATOCRIT % 30 7*   < > 28 8*   PLATELETS Thousands/uL 228   < > 214   NEUTROS PCT %  --   --  60   LYMPHS PCT %  --   --  18   MONOS PCT %  --   --  13*   EOS PCT %  --   --  7*    < > = values in this interval not displayed  Results from last 7 days   Lab Units 03/19/22  0722 03/16/22  0616 03/15/22  0503   SODIUM mmol/L 145   < > 144   POTASSIUM mmol/L 4 5   < > 4 8   CHLORIDE mmol/L 98*   < > 104   CO2 mmol/L 44*   < > 38*   BUN mg/dL 51*   < > 40*   CREATININE mg/dL 1 65*   < > 2 16*   ANION GAP mmol/L 3*   < > 2*   CALCIUM mg/dL 8 0*   < > 7 6*   ALBUMIN g/dL  --   --  2 9*   TOTAL BILIRUBIN mg/dL  --   --  0 23   ALK PHOS U/L  --   --  85   ALT U/L  --   --  14   AST U/L  --   --  12   GLUCOSE RANDOM mg/dL 183*   < > 194*    < > = values in this interval not displayed  Results from last 7 days   Lab Units 03/19/22  1617 03/19/22  1116 03/19/22  0706 03/18/22  2037 03/18/22  1637 03/18/22  1104 03/18/22  0708 03/17/22  2054 03/17/22  1625 03/17/22  1139 03/17/22  0719 03/16/22  1944   POC GLUCOSE mg/dl 294* 234* 188* 336* 234* 249* 157* 296* 285* 306* 194* 255*               Lines/Drains:  Invasive Devices  Report    Peripheral Intravenous Line            Peripheral IV 03/17/22 Right Forearm 2 days          Drain            External Urinary Catheter 6 days                      Imaging: Reviewed radiology reports from this admission including: chest xray    Recent Cultures (last 7 days):   Results from last 7 days   Lab Units 03/18/22  1610   GRAM STAIN RESULT  1+ Polys  No bacteria seen   BODY FLUID CULTURE, STERILE  No growth       Last 24 Hours Medication List:   Current Facility-Administered Medications   Medication Dose Route Frequency Provider Last Rate    aluminum-magnesium hydroxide-simethicone  30 mL Oral Q6H PRN Jaimie Piper MD      amLODIPine  5 mg Oral Daily Jaimie Piper MD      ammonium lactate   Topical BID Jaimie Piper MD      apixaban  5 mg Oral BID Jaimie Piper MD      docusate sodium  100 mg Oral BID Sushila Maya MD      furosemide  10 mg/hr Intravenous Continuous Felicidary Oshea MD 10 mg/hr (03/19/22 0347)    insulin glargine  28 Units Subcutaneous HS Sushila Maya MD      insulin lispro  1-5 Units Subcutaneous TID AC Jaimie Piper MD      insulin lispro  1-5 Units Subcutaneous HS Jaimie Piper MD      ipratropium-albuterol  3 mL Nebulization Q4H PRN Sushila Maya MD      lidocaine  2 patch Topical Daily Jaimie Piper MD      loratadine  10 mg Oral Daily Jaimie Piper MD      magnesium hydroxide  30 mL Oral Daily PRN Jaimie Piper MD      metoprolol  5 mg Intravenous Q6H PRN Jaimie Piper MD      metoprolol tartrate  50 mg Oral Q12H [address] Jaimie Piper MD      montelukast  10 mg Oral HS Jaimie Piper MD      nitroglycerin 0 4 mg Sublingual Q5 Min PRN Lupe Spencer MD      nystatin   Topical BID Lupe Spencer MD      ondansetron  4 mg Intravenous Q6H PRN Lupe Spencer MD      pravastatin  80 mg Oral Daily With Xenia Carroll MD      pregabalin  100 mg Oral BID Lupe Spencer MD      traMADol  50 mg Oral Q6H PRN Adam Rm PA-C          Today, Patient Was Seen By: Ari Brush MD    **Please Note: This note may have been constructed using a voice recognition system  **

## 2022-03-19 NOTE — ASSESSMENT & PLAN NOTE
· On arrival rapid a fib with RVR, responded to IV lopressor  · Continue metoprolol tartrate 50 mg PO BID at home   · DEMETRIS Vasc score 3, Dr Svetlana Hdz cleared to resume Eliquis    · Continue Eliquis 5 mg p o  B i d

## 2022-03-19 NOTE — PROGRESS NOTES
NEPHROLOGY PROGRESS NOTE   Chun Gilmore 73 y o  female MRN: [id number]  Unit/Bed#: [address] [place] [address] Encounter: [id number]  Reason for Consult: SEBASTIAN    ASSESSMENT AND PLAN:  73 y o  woman PMH of CKD G3b (creatinine were 1 4-1 6mg/dL) last time seen by Akosua Naidu in 2019, with multiple episodes of SEBASTIAN, DM, obesity, HTN, CHF, recent fall s/p left shoulder fracture, AFib on Eliquis p/w chest pain, elevated creatinine and fluid overload   Nephrology is consulted for SEBASTIAN in diuretic management        PLAN     #Non-Oliguric KDIGO SEBASTIAN stage 1 on CKD G3bA2 with evidence of recovery  · Etiology:  Likely secondary to CRS settings of CHF exacerbation complicated with contrast induced nephropathy  · Had CT with contrast on 3/9  · Baseline creatinine:  1 4-1 6 mg/dL with multiple episodes of SEBASTIAN  · Peak creatinine:  2 4 mg/dL  · Current creatinine:  1 6 mg/dL, remains at baseline  · UA:  Leukocyturia, no micro hematuria  · Renal imaging :    no hydronephrosis  · Treatment:  ·  Maintain MAP:  Over 65 mmHg if possible/avoid hypoperfusion:  Hold parameters on blood pressure medications  · Avoid nephrotoxic agents such as NSAIDs, and IV contrast if possible   Avoid opioids   · Adjust medications to GFR     #CKD G3bA2  · Baseline creatinine:  1 4-1 6 mg/dL  · Etiology:  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        #Acid-base Disorder  · PH 7 39 , pCO2 80 3, serum TOV3 68 VHYSEUYENM per L  · Metabolic alkalosis secondary to vascular contraction in the settings of diuretics with respiratory acidosis   · Consider BiPAP     #Volume status/blood pressure:  · Volume:   remains hypervolemic but improving  · Blood pressure:    normotensive /62mmhg, goal< 140/90mmhg  · CT showed pulmonary edema and pleural effusion  · Status post thoracentesis  · Recommend:  · Continue with Lasix infusion 10 milligrams/hour for 24 hours  · Monitor urinary output  · Daily weight  · Will accept higher levels of creatinine to achieve more appropriate fluid status        #Anemia:  · Current hemoglobin:  8 8 mg/dL no albumin to correct  · Treatment:  · Transfuse for hemoglobin less than 7 0 per primary service       # CHF exacerbation  · Diuretics as above  · Low-sodium and restriction       SUBJECTIVE:  Patient seen and examined at bedside    Patient reports that her breathing is improving, no chest pain    OBJECTIVE:  Current Weight: Weight - Scale: 127 kg (280 lb 6 8 oz)  Vitals:    03/19/22 0738   BP: 135/62   Pulse: 64   Resp:    Temp: (!) 97 3 °F (36 3 °C)   SpO2: 96%       Intake/Output Summary (Last 24 hours) at 3/19/2022 0847  Last data filed at 3/19/2022 0400  Gross per 24 hour   Intake 100 ml   Output 2150 ml   Net -2050 ml     Wt Readings from Last 3 Encounters:   03/18/22 127 kg (280 lb 6 8 oz)   03/08/22 135 kg (297 lb 9 9 oz)   03/03/22 127 kg (279 lb 15 8 oz)     Temp Readings from Last 3 Encounters:   03/19/22 (!) 97 3 °F (36 3 °C)   03/09/22 97 9 °F (36 6 °C) (Tympanic)   03/07/22 (!) 97 4 °F (36 3 °C) (Oral)     BP Readings from Last 3 Encounters:   03/19/22 135/62   03/09/22 144/66   03/07/22 164/69     Pulse Readings from Last 3 Encounters:   03/19/22 64   03/09/22 89   03/07/22 76      General:  Obese, no acute distress at this time  Skin:  No acute rash  Eyes:  No scleral icterus and noninjected  ENT:  mucous membranes moist  Neck:  no carotid bruits  Chest:  It sounds decreased in both lungs, good respiratory effort, no use of accessory respiratory muscles  CVS:  Regular rate and rhythm without a murmur rub , unable to assess  JVD due to body habitus   Abdomen:  soft and nontender   Extremities:  Lower extremity edema 1+  Neuro:  No gross focality  Psych:  Alert , cooperative   External urinary catheter:  Clear urine      Medications:    Current Facility-Administered Medications:     aluminum-magnesium hydroxide-simethicone (MYLANTA) oral suspension 30 mL, 30 mL, Oral, Q6H PRN, Kaur Ho MD  [facility] amLODIPine (NORVASC) tablet 5 mg, 5 mg, Oral, Daily, Judit Bhatti MD, 5 mg at 03/18/22 0825    ammonium lactate (LAC-HYDRIN) 12 % lotion, , Topical, BID, Judit Bhatti MD, Given at 03/18/22 1707    apixaban (ELIQUIS) tablet 5 mg, 5 mg, Oral, BID, Judit Bhatti MD, 5 mg at 03/18/22 1706    docusate sodium (COLACE) capsule 100 mg, 100 mg, Oral, BID, Sarah Law MD, 100 mg at 03/18/22 1706    furosemide (LASIX) 500 mg infusion 50 mL, 10 mg/hr, Intravenous, Continuous, Logan De Dios MD, Last Rate: 1 mL/hr at 03/19/22 0347, 10 mg/hr at 03/19/22 0347    insulin glargine (LANTUS) subcutaneous injection 24 Units 0 24 mL, 24 Units, Subcutaneous, HS, Sarah Law MD, 24 Units at 03/18/22 2134    insulin lispro (HumaLOG) 100 units/mL subcutaneous injection 1-5 Units, 1-5 Units, Subcutaneous, TID AC, 2 Units at 03/18/22 1706 **AND** Fingerstick Glucose (POCT), , , TID AC, Judit Bhatti MD    insulin lispro (HumaLOG) 100 units/mL subcutaneous injection 1-5 Units, 1-5 Units, Subcutaneous, HS, Judit Bhatti MD, 3 Units at 03/18/22 2135    ipratropium-albuterol (DUO-NEB) 0 5-2 5 mg/3 mL inhalation solution 3 mL, 3 mL, Nebulization, Q4H PRN, Sarah Law MD, 3 mL at 03/17/22 1104    lidocaine (LIDODERM) 5 % patch 2 patch, 2 patch, Topical, Daily, Judit Bhatti MD, 2 patch at 03/18/22 0824    loratadine (CLARITIN) tablet 10 mg, 10 mg, Oral, Daily, Judit Bhatti MD, 10 mg at 03/18/22 0825    magnesium hydroxide (MILK OF MAGNESIA) oral suspension 30 mL, 30 mL, Oral, Daily PRN, Judit Bhatti MD    metoprolol (LOPRESSOR) injection 5 mg, 5 mg, Intravenous, Q6H PRN, Judit Bhatti MD    metoprolol tartrate (LOPRESSOR) tablet 50 mg, 50 mg, Oral, Q12H [address], Judit Bhatti MD, 50 mg at 03/18/22 2135    montelukast (SINGULAIR) tablet 10 mg, 10 mg, Oral, HS, Judit Bhatti MD, 10 mg at 03/18/22 2134    nitroglycerin (NITROSTAT) SL tablet 0 4 mg, 0 4 mg, Sublingual, Q5 Min PRN, Judit Bhatti MD    nystatin (MYCOSTATIN) powder, , Topical, BID, Kevin Noble MD, Given at 03/18/22 1707    ondansetron [facility]) injection 4 mg, 4 mg, Intravenous, Q6H PRN, Kevin Noble MD, 4 mg at 03/17/22 2148    pravastatin (PRAVACHOL) tablet 80 mg, 80 mg, Oral, Daily With Marek De Souza MD, 80 mg at 03/18/22 1706    pregabalin (LYRICA) capsule 100 mg, 100 mg, Oral, BID, Kevin Noble MD, 100 mg at 03/18/22 1706    traMADol (ULTRAM) tablet 50 mg, 50 mg, Oral, Q6H PRN, Mikey Rm PA-C, 50 mg at 03/17/22 2143    Laboratory Results:  Results from last 7 days   Lab Units 03/19/22  0722 03/18/22  0650 03/17/22  0522 03/16/22  0616 03/15/22  0503 03/14/22  0515 03/13/22  0445   WBC Thousand/uL  --  6 99  --   --  7 46 7 82 8 23   HEMOGLOBIN g/dL  --  8 8*  --   --  8 2* 8 8* 8 7*   HEMATOCRIT %  --  30 7*  --   --  28 8* 29 6* 29 7*   PLATELETS Thousands/uL  --  228  --   --  214 223 253   SODIUM mmol/L 145 146* 144 145 144 143 143   POTASSIUM mmol/L 4 5 5 1 5 2 5 3 4 8 4 5 4 5   CHLORIDE mmol/L 98* 101 102 102 104 105 106   CO2 mmol/L 44* 41* 35* 37* 38* 38* 33*   BUN mg/dL 51* 49* 53* 53* 40* 34* 33*   CREATININE mg/dL 1 65* 1 60* 1 83* 2 44* 2 16* 1 89* 1 85*   CALCIUM mg/dL 8 0* 7 9* 8 0* 7 6* 7 6* 7 6* 7 6*       IR IN-Patient Thoracentesis   Final Result by Arielle Laws MD (03/18 1708)   Impression:   1  Successful ultrasound-guided thoracentesis yielding 500 mL of pleural fluid from the right chest and 300 mL of pleural fluid from the left chest              Workstation performed: [id number]         CT chest wo contrast   Final Result by Yeni Celestin MD (03/17 1020)      Nothing to suggest pneumonia  Septal thickening and alveolar nodules due to interstitial and alveolar edema with persistent moderate effusions  Workstation performed: [id number]         XR chest portable   Final Result by Yeni Celestin MD (03/17 6814)      Persistent mild pulmonary edema with small effusions  Workstation performed: [id number]         US kidney and bladder   Final Result by Dee Fritz MD (03/15 1505)      No hydronephrosis  Workstation performed: [id number]         XR chest portable   Final Result by Ayush Simon MD (03/13 1304)      Pulmonary vascular congestion is similar to prior study  Workstation performed: [id number]         VAS upper limb venous duplex scan, unilateral/limited   Final Result by Dinora Hyman MD (03/10 1823)      XR chest portable   Final Result by Neema Cope MD (03/10 0263)      Increased pulmonary vascular congestion and development of small bilateral pleural effusions  Workstation performed: [id number]         CTA ED chest PE Study   Final Result by Cece Valladares MD (03/09 2032)         1  Small bilateral pleural effusions and bibasilar atelectasis  Few airspace opacities in the right lung could represent mild aspiration  2   No evidence of acute pulmonary embolus, thoracic aortic aneurysm or dissection  Workstation performed: [id number]         XR chest 1 view portable   Final Result by Db Griffin MD (03/10 6460)      Hypoventilation with subsegmental atelectasis at the bases  Developing edema or inflammatory infiltrates are difficult to exclude  Please see subsequent CT for further report                  Workstation performed: [id number]             Portions of the record may have been created with voice recognition software  Occasional wrong word or "sound a like" substitutions may have occurred due to the inherent limitations of voice recognition software  Read the chart carefully and recognize, using context, where substitutions have occurred

## 2022-03-20 ENCOUNTER — APPOINTMENT (INPATIENT)
Dept: RADIOLOGY | Facility: HOSPITAL | Age: 72
DRG: 291 | End: 2022-03-20
Payer: MEDICARE

## 2022-03-20 LAB
ANION GAP SERPL CALCULATED.3IONS-SCNC: 3 MMOL/L (ref 4–13)
BUN SERPL-MCNC: 52 MG/DL (ref 5–25)
CALCIUM SERPL-MCNC: 8.2 MG/DL (ref 8.3–10.1)
CHLORIDE SERPL-SCNC: 94 MMOL/L (ref 100–108)
CO2 SERPL-SCNC: 44 MMOL/L (ref 21–32)
CREAT SERPL-MCNC: 1.66 MG/DL (ref 0.6–1.3)
GFR SERPL CREATININE-BSD FRML MDRD: 31 ML/MIN/1.73SQ M
GLUCOSE SERPL-MCNC: 222 MG/DL (ref 65–140)
GLUCOSE SERPL-MCNC: 228 MG/DL (ref 65–140)
GLUCOSE SERPL-MCNC: 276 MG/DL (ref 65–140)
GLUCOSE SERPL-MCNC: 362 MG/DL (ref 65–140)
GLUCOSE SERPL-MCNC: 366 MG/DL (ref 65–140)
GLUCOSE SERPL-MCNC: 500 MG/DL (ref 65–140)
GLUCOSE SERPL-MCNC: >500 MG/DL (ref 65–140)
LDH SERPL-CCNC: 254 U/L (ref 81–234)
POTASSIUM SERPL-SCNC: 4.1 MMOL/L (ref 3.5–5.3)
SODIUM SERPL-SCNC: 141 MMOL/L (ref 136–145)

## 2022-03-20 PROCEDURE — 99232 SBSQ HOSP IP/OBS MODERATE 35: CPT | Performed by: INTERNAL MEDICINE

## 2022-03-20 PROCEDURE — 82947 ASSAY GLUCOSE BLOOD QUANT: CPT | Performed by: INTERNAL MEDICINE

## 2022-03-20 PROCEDURE — 80048 BASIC METABOLIC PNL TOTAL CA: CPT | Performed by: INTERNAL MEDICINE

## 2022-03-20 PROCEDURE — 99232 SBSQ HOSP IP/OBS MODERATE 35: CPT | Performed by: STUDENT IN AN ORGANIZED HEALTH CARE EDUCATION/TRAINING PROGRAM

## 2022-03-20 PROCEDURE — 97110 THERAPEUTIC EXERCISES: CPT

## 2022-03-20 PROCEDURE — 83615 LACTATE (LD) (LDH) ENZYME: CPT | Performed by: INTERNAL MEDICINE

## 2022-03-20 PROCEDURE — 82948 REAGENT STRIP/BLOOD GLUCOSE: CPT

## 2022-03-20 PROCEDURE — 94760 N-INVAS EAR/PLS OXIMETRY 1: CPT

## 2022-03-20 PROCEDURE — 71045 X-RAY EXAM CHEST 1 VIEW: CPT

## 2022-03-20 RX ORDER — INSULIN GLARGINE 100 [IU]/ML
32 INJECTION, SOLUTION SUBCUTANEOUS
Status: DISCONTINUED | OUTPATIENT
Start: 2022-03-20 | End: 2022-03-21

## 2022-03-20 RX ADMIN — AMLODIPINE BESYLATE 5 MG: 5 TABLET ORAL at 10:41

## 2022-03-20 RX ADMIN — METOPROLOL TARTRATE 50 MG: 50 TABLET, FILM COATED ORAL at 10:42

## 2022-03-20 RX ADMIN — DOCUSATE SODIUM 100 MG: 100 CAPSULE ORAL at 17:52

## 2022-03-20 RX ADMIN — INSULIN LISPRO 3 UNITS: 100 INJECTION, SOLUTION INTRAVENOUS; SUBCUTANEOUS at 16:18

## 2022-03-20 RX ADMIN — ONDANSETRON 4 MG: 2 INJECTION INTRAMUSCULAR; INTRAVENOUS at 16:24

## 2022-03-20 RX ADMIN — LIDOCAINE 5% 2 PATCH: 700 PATCH TOPICAL at 10:43

## 2022-03-20 RX ADMIN — DOCUSATE SODIUM 100 MG: 100 CAPSULE ORAL at 10:42

## 2022-03-20 RX ADMIN — INSULIN LISPRO 3 UNITS: 100 INJECTION, SOLUTION INTRAVENOUS; SUBCUTANEOUS at 22:10

## 2022-03-20 RX ADMIN — TRAMADOL HYDROCHLORIDE 50 MG: 50 TABLET, COATED ORAL at 16:24

## 2022-03-20 RX ADMIN — NYSTATIN: 100000 POWDER TOPICAL at 10:44

## 2022-03-20 RX ADMIN — INSULIN LISPRO 14 UNITS: 100 INJECTION, SOLUTION INTRAVENOUS; SUBCUTANEOUS at 12:02

## 2022-03-20 RX ADMIN — Medication: at 10:43

## 2022-03-20 RX ADMIN — APIXABAN 5 MG: 5 TABLET, FILM COATED ORAL at 10:41

## 2022-03-20 RX ADMIN — PREGABALIN 100 MG: 100 CAPSULE ORAL at 10:42

## 2022-03-20 RX ADMIN — APIXABAN 5 MG: 5 TABLET, FILM COATED ORAL at 17:52

## 2022-03-20 RX ADMIN — PREGABALIN 100 MG: 100 CAPSULE ORAL at 17:52

## 2022-03-20 RX ADMIN — PRAVASTATIN SODIUM 80 MG: 80 TABLET ORAL at 16:18

## 2022-03-20 RX ADMIN — METOPROLOL TARTRATE 50 MG: 50 TABLET, FILM COATED ORAL at 21:59

## 2022-03-20 RX ADMIN — MONTELUKAST 10 MG: 10 TABLET, FILM COATED ORAL at 22:09

## 2022-03-20 RX ADMIN — INSULIN LISPRO 2 UNITS: 100 INJECTION, SOLUTION INTRAVENOUS; SUBCUTANEOUS at 08:08

## 2022-03-20 RX ADMIN — LORATADINE 10 MG: 10 TABLET ORAL at 10:42

## 2022-03-20 RX ADMIN — INSULIN GLARGINE 32 UNITS: 100 INJECTION, SOLUTION SUBCUTANEOUS at 22:09

## 2022-03-20 RX ADMIN — NYSTATIN: 100000 POWDER TOPICAL at 17:55

## 2022-03-20 RX ADMIN — Medication: at 17:54

## 2022-03-20 NOTE — ASSESSMENT & PLAN NOTE
· On arrival rapid a fib with RVR, responded to IV lopressor  · Continue metoprolol tartrate 50 mg PO BID at home   · DEMETRIS Vasc score 3, Dr Dena Monroy cleared to resume Eliquis    · Continue Eliquis 5 mg p o  B i d

## 2022-03-20 NOTE — PROGRESS NOTES
Progress Note - Cardiology   Stacy Salvador 73 y o  female MRN: [id number]  Unit/Bed#: [address] [place] [address] Encounter: [id number]  03/20/22          Assessment:  1  Acute on chronic diastolic congestive heart failure - patient started on IV lasix gtt  She had bilateral thoracentesis Friday  - Continue diuresis  - monitor urine output  - repeat standing weight was 264 pounds  - Will defer diuretic dosing to Nephrology  - chest x-ray scheduled for later today  2  Acute kidney injury - patient with significant improvement in creatinine - returned to baseline with diuresis  - nephrology has been consulted  3  Paroxysmal atrial fibrillation-currently in sinus rhythm  - continue Eliquis 5 mg b i d  Filiberto Garfield - continue Lopressor 50 mg b i d  4  Hypertension - BP has been stable  5  S P Left shoulder replacement on 3/1/22  Plan:  As above      Subjective: Stacy Salvador feels shortness of breath is improving  She was started on a lasix gtt by nephrology  Meds/Allergies   all current active meds have been reviewed  Allergies   Allergen Reactions    Penicillins Hives    Moxifloxacin Other (See Comments)     unknown    Percocet [Oxycodone-Acetaminophen] Other (See Comments)     unknown    Zinc Acetate Other (See Comments)     unknown    Nuts - Food Allergy Other (See Comments)    Asa [Aspirin] GI Intolerance    Indocin [Indomethacin] Other (See Comments)     Made patient "loopy"    Other Other (See Comments)     unknown       Objective   Vitals: Blood pressure 151/59, pulse 70, temperature 97 5 °F (36 4 °C), temperature source Oral, resp  rate 17, height 5' 2" (1 575 m), weight 120 kg (264 lb 8 8 oz), SpO2 93 %, not currently breastfeeding ,         Intake/Output Summary (Last 24 hours) at 3/20/2022 1404  Last data filed at 3/20/2022 1107  Gross per 24 hour   Intake 10 ml   Output 2310 ml   Net -2300 ml       Physical Exam   Constitutional: She appears healthy  No distress     Eyes: Pupils are equal, round, and reactive to light  Conjunctivae are normal    Neck: No JVD present  Cardiovascular: Normal rate, regular rhythm and normal heart sounds  Exam reveals no gallop and no friction rub  No murmur heard  Pulmonary/Chest: Effort normal and breath sounds normal  She has no wheezes  She has no rales  Musculoskeletal:         General: No tenderness or edema  Cervical back: Normal range of motion and neck supple  Comments: Left arm in sling   Neurological: She is alert and oriented to person, place, and time  Skin: Skin is warm and dry         Lab Results:     Troponins:       Recent Results (from the past 24 hour(s))   Fingerstick Glucose (POCT)    Collection Time: 03/19/22  4:17 PM   Result Value Ref Range    POC Glucose 294 (H) 65 - 140 mg/dl   Fingerstick Glucose (POCT)    Collection Time: 03/19/22  9:13 PM   Result Value Ref Range    POC Glucose 380 (H) 65 - 140 mg/dl   Basic metabolic panel    Collection Time: 03/20/22  6:09 AM   Result Value Ref Range    Sodium 141 136 - 145 mmol/L    Potassium 4 1 3 5 - 5 3 mmol/L    Chloride 94 (L) 100 - 108 mmol/L    CO2 44 (H) 21 - 32 mmol/L    ANION GAP 3 (L) 4 - 13 mmol/L    BUN 52 (H) 5 - 25 mg/dL    Creatinine 1 66 (H) 0 60 - 1 30 mg/dL    Glucose 222 (H) 65 - 140 mg/dL    Calcium 8 2 (L) 8 3 - 10 1 mg/dL    eGFR 31 ml/min/1 73sq m   LD,Blood    Collection Time: 03/20/22  6:09 AM   Result Value Ref Range     (H) 81 - 234 U/L   Fingerstick Glucose (POCT)    Collection Time: 03/20/22  7:56 AM   Result Value Ref Range    POC Glucose 228 (H) 65 - 140 mg/dl   Fingerstick Glucose (POCT)    Collection Time: 03/20/22 11:34 AM   Result Value Ref Range    POC Glucose 500 (H) 65 - 140 mg/dl   Fingerstick Glucose (POCT)    Collection Time: 03/20/22 11:41 AM   Result Value Ref Range    POC Glucose >500 (HH) 65 - 140 mg/dl   Glucose, random    Collection Time: 03/20/22 12:01 PM   Result Value Ref Range    Glucose 366 (H) 65 - 140 mg/dL Cardiac testing: Reviewed - See Above      Imaging: I have personally reviewed pertinent films in PACS - CT chest with pleural effusions and vascular congestion

## 2022-03-20 NOTE — PHYSICAL THERAPY NOTE
PT TREATMENT     03/20/22 1015   Note Type   Note Type Treatment   Pain Assessment   Pain Assessment Tool Guan-Baker FACES   Guan-Baker FACES Pain Rating 2  (Left shoulder area)   Restrictions/Precautions   LUE Weight Bearing Per Order NWB   Braces or Orthoses Sling  (No abductor pillow present)   Other Precautions Chair Alarm; Bed Alarm; Fall Risk  (Orthopedic restrictions with left shoulder)   General   Chart Reviewed Yes   Family/Caregiver Present No   Cognition   Arousal/Participation Cooperative   Subjective   Subjective Patient states wanting to get up and get moving   Bed Mobility   Supine to Sit 4  Minimal assistance   Additional items Assist x 1;Verbal cues   Transfers   Sit to Stand 4  Minimal assistance   Additional items Assist x 1   Stand to Sit 4  Minimal assistance   Additional items Assist x 1   Ambulation/Elevation   Gait Assistance 4  Minimal assist   Additional items Assist x 1;Verbal cues; Tactile cues   Assistive Device Straight cane   Distance 10 ft from bed to chair, nonweightbearing left upper extremity   Balance   Static Sitting Fair +   Dynamic Sitting Fair   Static Standing Fair   Dynamic Standing Fair -   Ambulatory Fair -   Activity Tolerance   Activity Tolerance Patient tolerated treatment well;Patient limited by fatigue   Nurse Made Aware Yes   Exercises   Heelslides Supine;5 reps;Bilateral   Hip Flexion Sitting;10 reps;Bilateral   Hip Abduction Sitting;10 reps;Bilateral   Knee AROM Short Arc Quad Supine;5 reps;Bilateral   Knee AROM Long Arc Quad Sitting;10 reps;Bilateral   Ankle Pumps Sitting;10 reps;Bilateral   Marching Standing;5 reps;Bilateral   Balance training  Sidestepping and backward walking for balance coordination   Assessment   Assessment Patient cooperative and motivated to increase functional mobility  Patient out of bed in recliner chair at end therapy session and will benefit continued physical therapy with progression as tolerated and follow-up home therapy   Patient to be staying with her family upon discharge (niece)  The patient's AM-PAC Basic Mobility Inpatient Short Form Raw Score is 17  A Raw score of greater than 16 suggests the patient may benefit from discharge to home  Please also refer to the recommendation of the Physical Therapist for safe discharge planning  Plan   Treatment/Interventions ADL retraining;Functional transfer training;LE strengthening/ROM; Therapeutic exercise; Endurance training;Patient/family training;Equipment eval/education; Bed mobility;Gait training; Compensatory technique education   PT Frequency Other (Comment)  (5 times/week)   Recommendation   PT Discharge Recommendation Home with home health rehabilitation   [address] [place] [address] Mobility Inpatient   Turning in Bed Without Bedrails 3   Lying on Back to Sitting on Edge of Flat Bed 3   Moving Bed to Chair 3   Standing Up From Chair 3   Walk in Room 3   Climb 3-5 Stairs 2   Basic Mobility Inpatient Raw Score 17   Basic Mobility Standardized Score 39 67   Highest Level Of Mobility   JH-HLM Goal 5: Stand one or more mins   JH-HLM Highest Level of Mobility 6: Walk 10 steps or more   JH-HLM Goal Achieved Yes   Education   Patient Explanation/teachback used;Demonstrates acceptance/verbal understanding   Licensure   [place] License Number  Leeanna Garner [id number]

## 2022-03-20 NOTE — ASSESSMENT & PLAN NOTE
Lab Results   Component Value Date    HGBA1C 6 8 (H) 03/09/2022     Recent Labs     03/19/22  1116 03/19/22  1617 03/19/22  2113 03/20/22  0756   POCGLU 234* 294* 380* 228*     Blood Sugar Average: Last 72 hrs:  (P) 260 [id number]   · Blood sugars continued remained uncontrolled    Lantus dose increased to 32 units daily  · A1c indicated good control   · Continue Humalog sliding scale

## 2022-03-20 NOTE — ASSESSMENT & PLAN NOTE
· Creatinine continues to get worse and is up to 2 6  · Baseline Cr 1 4-1 6 with multiple episodes of SEBASTIAN  · Likely secondary to CRS in setting of CHF exacerbation complicated with contrast induced nephropathy  · Had CT contrast 3/9  · Nephrology consulted:  · Renal ultrasound showed no hydronephrosis  · Avoid nephrotoxins and hypotension  · Monitor BMP  · No urgent indication for dialysis  · Patient was on Lasix 60 milligram IV q 12 hours which was changed to torsemide 20 milligram p o  Daily   · Might need accept higher creatinine to maintain euvolemic state  · Patient was started on Lasix drip at 10 milligram/hour with stable creatinine  · Possible transition to p o  Diuretics in a m

## 2022-03-20 NOTE — PLAN OF CARE
Problem: Nutrition/Hydration-ADULT  Goal: Nutrient/Hydration intake appropriate for improving, restoring or maintaining nutritional needs  Description: Monitor and assess patient's nutrition/hydration status for malnutrition  Collaborate with interdisciplinary team and initiate plan and interventions as ordered  Monitor patient's weight and dietary intake as ordered or per policy  Utilize nutrition screening tool and intervene as necessary  Determine patient's food preferences and provide high-protein, high-caloric foods as appropriate       INTERVENTIONS:  - Monitor oral intake, urinary output, labs, and treatment plans  - Assess nutrition and hydration status and recommend course of action  - Evaluate amount of meals eaten  - Assist patient with eating if necessary   - Allow adequate time for meals  - Recommend/ encourage appropriate diets, oral nutritional supplements, and vitamin/mineral supplements  - Order, calculate, and assess calorie counts as needed  - Recommend, monitor, and adjust tube feedings and TPN/PPN based on assessed needs  - Assess need for intravenous fluids  - Provide specific nutrition/hydration education as appropriate  - Include patient/family/caregiver in decisions related to nutrition  Outcome: Progressing     Problem: Prexisting or High Potential for Compromised Skin Integrity  Goal: Skin integrity is maintained or improved  Description: INTERVENTIONS:  - Identify patients at risk for skin breakdown  - Assess and monitor skin integrity  - Assess and monitor nutrition and hydration status  - Monitor labs   - Turn and reposition patient  - Assist with mobility/ambulation  - Relieve pressure over bony prominences  - Avoid friction and shearing  - Provide appropriate hygiene as needed including keeping skin clean and dry  - Evaluate need for skin moisturizer/barrier cream  - Collaborate with interdisciplinary team   - Patient/family teaching    Outcome: Progressing     Problem: Potential for Falls  Goal: Patient will remain free of falls  Description: INTERVENTIONS:  - Educate patient/family on patient safety including physical limitations  - Instruct patient to call for assistance with activity   - Consult OT/PT to assist with strengthening/mobility   - Keep Call bell within reach  - Keep bed low and locked with side rails adjusted as appropriate  - Keep care items and personal belongings within reach  - Initiate and maintain comfort rounds  - Make Fall Risk Sign visible to staff    - Initiate/Maintain alarm  - Obtain necessary fall risk management equipment:   - Apply yellow socks and bracelet for high fall risk patients  - Consider moving patient to room near nurses station  Outcome: Progressing     Problem: MOBILITY - ADULT  Goal: Maintain or return to baseline ADL function  Description: INTERVENTIONS:  - Educate patient/family on patient safety including physical limitations  - Instruct patient to call for assistance with activity   - Consult OT/PT to assist with strengthening/mobility   - Keep Call bell within reach  - Keep bed low and locked with side rails adjusted as appropriate  - Keep care items and personal belongings within reach  - Initiate and maintain comfort rounds  - Make Fall Risk Sign visible to staff    - Apply yellow socks and bracelet for high fall risk patients  - Consider moving patient to room near nurses station  Outcome: Progressing       Problem: PAIN - ADULT  Goal: Verbalizes/displays adequate comfort level or baseline comfort level  Description: Interventions:  - Encourage patient to monitor pain and request assistance  - Assess pain using appropriate pain scale  - Administer analgesics based on type and severity of pain and evaluate response  - Implement non-pharmacological measures as appropriate and evaluate response  - Consider cultural and social influences on pain and pain management  - Notify physician/advanced practitioner if interventions unsuccessful or patient reports new pain  Outcome: Progressing     Problem: INFECTION - ADULT  Goal: Absence or prevention of progression during hospitalization  Description: INTERVENTIONS:  - Assess and monitor for signs and symptoms of infection  - Monitor lab/diagnostic results  - Monitor all insertion sites, i e  indwelling lines, tubes, and drains  - Monitor endotracheal if appropriate and nasal secretions for changes in amount and color  - [place] appropriate cooling/warming therapies per order  - Administer medications as ordered  - Instruct and encourage patient and family to use good hand hygiene technique  - Identify and instruct in appropriate isolation precautions for identified infection/condition  Outcome: Progressing     Problem: SAFETY ADULT  Goal: Patient will remain free of falls  Description: INTERVENTIONS:  - Educate patient/family on patient safety including physical limitations  - Instruct patient to call for assistance with activity   - Consult OT/PT to assist with strengthening/mobility   - Keep Call bell within reach  - Keep bed low and locked with side rails adjusted as appropriate  - Keep care items and personal belongings within reach  - Initiate and maintain comfort rounds  - Make Fall Risk Sign visible to staff    - Initiate/Maintain alarm  - Obtain necessary fall risk management equipment:   - Apply yellow socks and bracelet for high fall risk patients  - Consider moving patient to room near nurses station  Outcome: Progressing  Goal: Maintain or return to baseline ADL function  Description: INTERVENTIONS:  - Educate patient/family on patient safety including physical limitations  - Instruct patient to call for assistance with activity   - Consult OT/PT to assist with strengthening/mobility   - Keep Call bell within reach  - Keep bed low and locked with side rails adjusted as appropriate  - Keep care items and personal belongings within reach  - Initiate and maintain comfort rounds  - Make Fall Risk Sign visible to staff    - Apply yellow socks and bracelet for high fall risk patients  - Consider moving patient to room near nurses station  Outcome: Progressing  Goal: Maintains/Returns to pre admission functional level  Description: INTERVENTIONS:  - Perform BMAT or MOVE assessment daily    - Set and communicate daily mobility goal to care team and patient/family/caregiver  - Collaborate with rehabilitation services on mobility goals if consulted  - Perform Range of Motion 2 times a day  - Reposition patient every 2 hours    - Dangle patient 3 times a day  - Stand patient 3 times a day  - Ambulate patient 3 times a day  - Out of bed to chair 3 times a day   - Out of bed for meals 3 times a day  - Out of bed for toileting  - Record patient progress and toleration of activity level   Outcome: Progressing     Problem: DISCHARGE PLANNING  Goal: Discharge to home or other facility with appropriate resources  Description: INTERVENTIONS:  - Identify barriers to discharge w/patient and caregiver  - Arrange for needed discharge resources and transportation as appropriate  - Identify discharge learning needs (meds, wound care, etc )  - Arrange for interpretive services to assist at discharge as needed  - Refer to Case Management Department for coordinating discharge planning if the patient needs post-hospital services based on physician/advanced practitioner order or complex needs related to functional status, cognitive ability, or social support system  Outcome: Progressing     Problem: CARDIOVASCULAR - ADULT  Goal: Maintains optimal cardiac output and hemodynamic stability  Description: INTERVENTIONS:  - Monitor I/O, vital signs and rhythm  - Monitor for S/S and trends of decreased cardiac output  - Assess quality of pulses, skin color and temperature  - Instruct patient to report change in severity of symptoms  Outcome: Progressing  Goal: Absence of cardiac dysrhythmias or at baseline rhythm  Description: INTERVENTIONS:  - Continuous cardiac monitoring, vital signs, obtain 12 lead EKG if ordered  - Administer antiarrhythmic and heart rate control medications as ordered  - Monitor electrolytes and administer replacement therapy as ordered  Outcome: Progressing     Problem: RESPIRATORY - ADULT  Goal: Achieves optimal ventilation and oxygenation  Description: INTERVENTIONS:  - Assess for changes in respiratory status  - Assess for changes in mentation and behavior  - Position to facilitate oxygenation and minimize respiratory effort  - Oxygen administered by appropriate delivery if ordered  - Initiate smoking cessation education as indicated  - Encourage broncho-pulmonary hygiene including cough, deep breathe, Incentive Spirometry  - Assess the need for suctioning and aspirate as needed  - Assess and instruct to report SOB or any respiratory difficulty  - Respiratory Therapy support as indicated  Outcome: Progressing

## 2022-03-20 NOTE — PROGRESS NOTES
[address]  Progress Note Madhav Corley 1950, 73 y o  female MRN: [id number]  Unit/Bed#: [address] [place] [address] Encounter: [id number]  Primary Care Provider: Leana French   Date and time admitted to hospital: 3/9/2022  4:26 PM    * Acute diastolic congestive heart failure [facility])  Assessment & Plan  Wt Readings from Last 3 Encounters:   03/20/22 120 kg (264 lb 8 8 oz)   03/08/22 135 kg (297 lb 9 9 oz)   03/03/22 127 kg (279 lb 15 8 oz)     · SOB POA, secondary to acute on chronic diastolic CHF  BNP 5992  CT shows small b/l plerual effusions   · Recent shoulder surgery, SEBASTIAN, not on diuretics at SNF (previously on torsemide 10mg/d)  · ECHO performed 3/10 shows EF 08%, systolic function low normal, borderline concentric hypertrophy as per Cardiology report  · Procalcitonin negative - would hold off abx for aspiration related air space opacities seen on CT    · CTA chest negative for pulmonary embolism   · Patient had developed some SOB on 3/13; repeat chest xray continues to show pulmonary congestion  · Patient was on Lasix 60 milligram IV q 12 hours with albumin which was later changed to torsemide 20 milligram p o  Daily on 3/16/22  · Continue 2 liters oxygen  Patient arranged for home oxygen  · Continue metoprolol 50 mg q 12 hours  · Continue intake and output, daily weights  · Patient developed shortness of breath with wheezing again this morning which improved slightly with nebulizer treatment  Repeat chest x-ray showing persistent pulmonary vascular congestion  CT scan of the chest was ordered which showed septal thickening and alveolar nodules due to interstitial and alveolar edema with persistent moderate effusions  · Patient received Lasix 80 milligram IV and torsemide 20 milligram on March 17, 2022  · Patient has been started on Lasix drip at 10 milligram/hour on March 18, 2022  Continue Lasix drip at 10 milligram/hour for today  Possible transition to p o   Torsemide 40 milligram p o  B i d  tomorrow based on chest x-ray  · Patient underwent thoracentesis with removal of 500 milliliters of pleural fluid from the right and 300 mL from the left  Pleural fluid analysis suggesting transudative effusion  · Follow-up chest x-ray    SEBASTIAN (acute kidney injury) ([facility] [address] )  Assessment & Plan  · Creatinine continues to get worse and is up to 2 6  · Baseline Cr 1 4-1 6 with multiple episodes of SEBASTIAN  · Likely secondary to CRS in setting of CHF exacerbation complicated with contrast induced nephropathy  · Had CT contrast 3/9  · Nephrology consulted:  · Renal ultrasound showed no hydronephrosis  · Avoid nephrotoxins and hypotension  · Monitor BMP  · No urgent indication for dialysis  · Patient was on Lasix 60 milligram IV q 12 hours which was changed to torsemide 20 milligram p o  Daily   · Might need accept higher creatinine to maintain euvolemic state  · Patient was started on Lasix drip at 10 milligram/hour with stable creatinine  · Possible transition to p o  Diuretics in a m  Paroxysmal atrial fibrillation (HCC)  Assessment & Plan  · On arrival rapid a fib with RVR, responded to IV lopressor  · Continue metoprolol tartrate 50 mg PO BID at home   · DEMETRIS Vasc score 3, Dr Alvin Haynes cleared to resume Eliquis    · Continue Eliquis 5 mg p o  B i d  Anemia  Assessment & Plan  · Hgb stable, likely worsened secondary to post op loss   · Hemoglobin has been stable in the range of 8    Morbid obesity with BMI of 45 0-49 9, adult ([facility])  Assessment & Plan  · Counseled weight loss   · Sleep apnea, refusing CPAP     Status post reverse total replacement of left shoulder  Assessment & Plan  · S/p fall resulting in Comminuted displaced fracture of the proximal humerus (2/23/22)    · S/p left shoulder reverse complete arthroplasty (3/1/22)   · Cleared from ortho standpoint for patient to start Eliquis   · LUE venous duplex is negative for DVT  · PT / OT consults appreciated; recommending home with therapy      Diabetes mellitus, type 2 [facility])  Assessment & Plan  Lab Results   Component Value Date    HGBA1C 6 8 (H) 2022     Recent Labs     22  1116 22  1617 22  2113 22  0756   POCGLU 234* 294* 380* 228*     Blood Sugar Average: Last 72 hrs:  (P) 260 [id number]   · Blood sugars continued remained uncontrolled  Lantus dose increased to 32 units daily  · A1c indicated good control   · Continue Humalog sliding scale    Essential hypertension  Assessment & Plan  · On lopressor PO; increased to 50 mg p o  Q 12 hours during hospitalization  · Continue Norvasc    Mixed hyperlipidemia  Assessment & Plan  · On rosuvastatin  · Heart healthy diet          VTE Pharmacologic Prophylaxis: VTE Score: 17 High Risk (Score >/= 5) - Pharmacological DVT Prophylaxis Ordered: apixaban (Eliquis)  Sequential Compression Devices Ordered  Patient Centered Rounds: I performed bedside rounds with nursing staff today  Discussions with Specialists or Other Care Team Provider: Yes    Education and Discussions with Family / Patient: Attempted to update  (niece) via phone  Left voicemail  Time Spent for Care: 45 minutes  More than 50% of total time spent on counseling and coordination of care as described above  Current Length of Stay: 11 day(s)  Current Patient Status: Inpatient   Certification Statement: The patient will continue to require additional inpatient hospital stay due to Acute CHF  Discharge Plan: Anticipate discharge in 48-72 hrs to home with home services  Code Status: Level 1 - Full Code    Subjective:   Patient is feeling better  Improved shortness of breath and leg swelling    Denies any chest pain, palpitations, abdominal pain    Objective:     Vitals:   Temp (24hrs), Av 7 °F (36 5 °C), Min:97 5 °F (36 4 °C), Max:98 °F (36 7 °C)    Temp:  [97 5 °F (36 4 °C)-98 °F (36 7 °C)] 97 5 °F (36 4 °C)  HR:  [69-75] 70  Resp:  [17-19] 17  BP: (108-151)/(47-73) 151/59  SpO2:  [93 %-95 %] 93 %  Body mass index is 48 39 kg/m²  Input and Output Summary (last 24 hours): Intake/Output Summary (Last 24 hours) at 3/20/2022 1141  Last data filed at 3/20/2022 1107  Gross per 24 hour   Intake 10 ml   Output 2310 ml   Net -2300 ml       Physical Exam:   Physical Exam  Constitutional:       Appearance: Normal appearance  HENT:      Head: Normocephalic and atraumatic  Nose: Nose normal       Mouth/Throat:      Mouth: Mucous membranes are moist       Pharynx: Oropharynx is clear  Eyes:      Extraocular Movements: Extraocular movements intact  Pupils: Pupils are equal, round, and reactive to light  Cardiovascular:      Rate and Rhythm: Normal rate and regular rhythm  Pulmonary:      Effort: Pulmonary effort is normal       Breath sounds: Normal breath sounds  Abdominal:      General: Bowel sounds are normal  There is no distension  Palpations: Abdomen is soft  Tenderness: There is no abdominal tenderness  Musculoskeletal:         General: No swelling  Cervical back: Normal range of motion and neck supple  Right lower leg: Edema present  Left lower leg: Edema present  Comments: Trace edema   Skin:     General: Skin is warm and dry  Neurological:      General: No focal deficit present  Mental Status: She is alert  Additional Data:     Labs:  Results from last 7 days   Lab Units 03/18/22  0650 03/15/22  0503 03/15/22  0503   WBC Thousand/uL 6 99   < > 7 46   HEMOGLOBIN g/dL 8 8*   < > 8 2*   HEMATOCRIT % 30 7*   < > 28 8*   PLATELETS Thousands/uL 228   < > 214   NEUTROS PCT %  --   --  60   LYMPHS PCT %  --   --  18   MONOS PCT %  --   --  13*   EOS PCT %  --   --  7*    < > = values in this interval not displayed       Results from last 7 days   Lab Units 03/20/22  0609 03/16/22  0616 03/15/22  0503   SODIUM mmol/L 141   < > 144   POTASSIUM mmol/L 4 1   < > 4 8   CHLORIDE mmol/L 94*   < > 104   CO2 mmol/L 44*   < > 38*   BUN mg/dL 52*   < > 40*   CREATININE mg/dL 1 66*   < > 2 16*   ANION GAP mmol/L 3*   < > 2*   CALCIUM mg/dL 8 2*   < > 7 6*   ALBUMIN g/dL  --   --  2 9*   TOTAL BILIRUBIN mg/dL  --   --  0 23   ALK PHOS U/L  --   --  85   ALT U/L  --   --  14   AST U/L  --   --  12   GLUCOSE RANDOM mg/dL 222*   < > 194*    < > = values in this interval not displayed           Results from last 7 days   Lab Units 03/20/22  0756 03/19/22  2113 03/19/22  1617 03/19/22  1116 03/19/22  0706 03/18/22  2037 03/18/22  1637 03/18/22  1104 03/18/22  0708 03/17/22  2054 03/17/22  1625 03/17/22  1139   POC GLUCOSE mg/dl 228* 380* 294* 234* 188* 336* 234* 249* 157* 296* 285* 306*               Lines/Drains:  Invasive Devices  Report    Peripheral Intravenous Line            Peripheral IV 03/17/22 Right Forearm 2 days          Drain            External Urinary Catheter 6 days                      Imaging: Reviewed radiology reports from this admission including: chest xray and chest CT scan    Recent Cultures (last 7 days):   Results from last 7 days   Lab Units 03/18/22  1610   GRAM STAIN RESULT  1+ Polys  No bacteria seen   BODY FLUID CULTURE, STERILE  No growth       Last 24 Hours Medication List:   Current Facility-Administered Medications   Medication Dose Route Frequency Provider Last Rate    aluminum-magnesium hydroxide-simethicone  30 mL Oral Q6H PRN Jaimie Piper MD      amLODIPine  5 mg Oral Daily Jaimie Piper MD      ammonium lactate   Topical BID Jaimie Piper MD      apixaban  5 mg Oral BID Jaimie Piper MD      docusate sodium  100 mg Oral BID Sushila Maya MD      furosemide  10 mg/hr Intravenous Continuous Felluis Oshea MD 10 mg/hr (03/20/22 0701)    insulin glargine  32 Units Subcutaneous HS Sushila Maya MD      insulin lispro  1-5 Units Subcutaneous TID AC Jaimie Piper MD      insulin lispro  1-5 Units Subcutaneous HS Jaimie Piper MD      ipratropium-albuterol  3 mL Nebulization Q4H PRN Dee Rojas MD      lidocaine  2 patch Topical Daily Rico Ramirez MD      loratadine  10 mg Oral Daily Rico Ramirez MD      magnesium hydroxide  30 mL Oral Daily PRN Rico Ramirez MD      metoprolol  5 mg Intravenous Q6H PRN Rico Ramirez MD      metoprolol tartrate  50 mg Oral Q12H [address] Rico Ramirez MD      montelukast  10 mg Oral HS Rico Ramirez MD      nitroglycerin  0 4 mg Sublingual Q5 Min PRN Rico Ramirez MD      nystatin   Topical BID Rico Ramirez MD      ondansetron  4 mg Intravenous Q6H PRN Rico Ramirez MD      pravastatin  80 mg Oral Daily With Celsa King MD      pregabalin  100 mg Oral BID Rico Ramirez MD      traMADol  50 mg Oral Q6H PRN Rajat Rm PA-C          Today, Patient Was Seen By: Dee Rojas MD    **Please Note: This note may have been constructed using a voice recognition system  **

## 2022-03-20 NOTE — PROGRESS NOTES
NEPHROLOGY PROGRESS NOTE   Faheem Lemus 73 y o  female MRN: [id number]  Unit/Bed#: [address] [place] [address] Encounter: [id number]  Reason for Consult: SEBASTIAN    ASSESSMENT AND PLAN:  73 y o  woman PMH of CKD G3b (creatinine were 1 4-1 6mg/dL) last time seen by Akosua Naidu in 2019, with multiple episodes of SEBASTIAN, DM, obesity, HTN, CHF, recent fall s/p left shoulder fracture, AFib on Eliquis p/w chest pain, elevated creatinine and fluid overload   Nephrology is consulted for SEBASTIAN in diuretic management        PLAN     #Non-Oliguric KDIGO SEBASTIAN stage 1 on CKD G3bA2 with evidence of recovery  · Etiology:  Likely secondary to CRS settings of CHF exacerbation complicated with contrast induced nephropathy  · Had CT with contrast on 3/9  · Baseline creatinine:  1 4-1 6 mg/dL with multiple episodes of SEBASTIAN  · Peak creatinine:  2 4 mg/dL  · Current creatinine:  1 6 mg/dL continue to be at baseline  · UA:  Leukocyturia, no micro hematuria  · Renal imaging :    no hydronephrosis  · Treatment:  ·  Maintain MAP:  Over 65 mmHg if possible/avoid hypoperfusion:  Hold parameters on blood pressure medications  · Avoid nephrotoxic agents such as NSAIDs, and IV contrast if possible  Avoid opioids   · Adjust medications to GFR     #CKD G3bA2  · Baseline creatinine:  1 4-1 6 mg/dL  · Etiology:  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        #Acid-base Disorder  · Last ABG PH 7 39 , pCO2 80 3, serum YCB9 03 ROTGZILSTV per L  · Metabolic alkalosis secondary to vascular contraction in the settings of diuretics with respiratory acidosis      #Volume status/blood pressure:  · Volume:  Hypervolemic but improving  · Blood pressure:  Hypertensive /59mmhg, goal< 140/90mmhg  · CT showed pulmonary edema and pleural effusion  · Status post thoracentesis  · Recommend:  · Continue with Lasix infusion 10 milligrams/hour for today  · Might be able to switch to b i d   Tomorrow  · Monitor urinary output  · Daily weight (weights are inconsistent)  · Will accept higher levels of creatinine to achieve more appropriate fluid status      #Anemia:  · Current hemoglobin:  8 8 mg/dL no albumin to correct  · Treatment:  · Transfuse for hemoglobin less than 7 0 per primary service       # CHF exacerbation  · Diuretics as above  · Low-sodium and fluid restriction  · Cardiology following appreciate recs      SUBJECTIVE:  Patient seen and examined at bedside  Patient is feeling better this morning, eating breakfast, no shortness of breath, no chest pain    Has a very good at urinary output with IV Lasix , had 1 episode of unclear hypotension  overnight , this morning hypertensive again     OBJECTIVE:  Current Weight: Weight - Scale: 120 kg (264 lb 8 8 oz)  Vitals:    03/20/22 0736   BP: 151/59   Pulse: 70   Resp: 17   Temp: 97 5 °F (36 4 °C)   SpO2: 93%       Intake/Output Summary (Last 24 hours) at 3/20/2022 0833  Last data filed at 3/20/2022 0813  Gross per 24 hour   Intake 10 ml   Output 2760 ml   Net -2750 ml     Wt Readings from Last 3 Encounters:   03/20/22 120 kg (264 lb 8 8 oz)   03/08/22 135 kg (297 lb 9 9 oz)   03/03/22 127 kg (279 lb 15 8 oz)     Temp Readings from Last 3 Encounters:   03/20/22 97 5 °F (36 4 °C) (Oral)   03/09/22 97 9 °F (36 6 °C) (Tympanic)   03/07/22 (!) 97 4 °F (36 3 °C) (Oral)     BP Readings from Last 3 Encounters:   03/20/22 151/59   03/09/22 144/66   03/07/22 164/69     Pulse Readings from Last 3 Encounters:   03/20/22 70   03/09/22 89   03/07/22 76      General:  Obese, no acute distress at this time  Skin:  No acute rash  Eyes:  No scleral icterus and noninjected  ENT:  mucous membranes moist  Neck:  no carotid bruits  Chest:  Breath sounds decreased in both lungs bilateral crackles in bases, no wheezing, good respiratory effort, no use of accessory respiratory muscles  CVS:  Regular rate and rhythm without a murmur rub , no jugular venous distention,  Abdomen:  soft and nontender   Extremities:  Trace lower extremity edema  Neuro:  No gross focality  Psych:  Alert , cooperative   External catheter:  Clear urine      Medications:    Current Facility-Administered Medications:     aluminum-magnesium hydroxide-simethicone (MYLANTA) oral suspension 30 mL, 30 mL, Oral, Q6H PRN, Shin Calhoun MD    amLODIPine (NORVASC) tablet 5 mg, 5 mg, Oral, Daily, Shin Calhoun MD, 5 mg at 03/19/22 2531    ammonium lactate (LAC-HYDRIN) 12 % lotion, , Topical, BID, Shin Calhoun MD, Given at 03/19/22 1904    apixaban (ELIQUIS) tablet 5 mg, 5 mg, Oral, BID, Shin Calhoun MD, 5 mg at 03/19/22 1725    docusate sodium (COLACE) capsule 100 mg, 100 mg, Oral, BID, Doris Smyth MD, 100 mg at 03/19/22 1725    furosemide (LASIX) 500 mg infusion 50 mL, 10 mg/hr, Intravenous, Continuous, Allen Worrell MD, Last Rate: 1 mL/hr at 03/20/22 0701, 10 mg/hr at 03/20/22 0701    insulin glargine (LANTUS) subcutaneous injection 28 Units 0 28 mL, 28 Units, Subcutaneous, HS, Georgi Dye MD, 28 Units at 03/19/22 2115    insulin lispro (HumaLOG) 100 units/mL subcutaneous injection 1-5 Units, 1-5 Units, Subcutaneous, TID AC, 2 Units at 03/20/22 0808 **AND** Fingerstick Glucose (POCT), , , TID AC, Shin Calhoun MD    insulin lispro (HumaLOG) 100 units/mL subcutaneous injection 1-5 Units, 1-5 Units, Subcutaneous, HS, Shin Calhoun MD, 4 Units at 03/19/22 2115    ipratropium-albuterol (DUO-NEB) 0 5-2 5 mg/3 mL inhalation solution 3 mL, 3 mL, Nebulization, Q4H PRN, Kristen Dye MD, 3 mL at 03/17/22 1104    lidocaine (LIDODERM) 5 % patch 2 patch, 2 patch, Topical, Daily, Shin Calhoun MD, 2 patch at 03/19/22 0939    loratadine (CLARITIN) tablet 10 mg, 10 mg, Oral, Daily, Shin Calhoun MD, 10 mg at 03/19/22 1115    magnesium hydroxide (MILK OF MAGNESIA) oral suspension 30 mL, 30 mL, Oral, Daily PRN, Shin Calhoun MD    metoprolol (LOPRESSOR) injection 5 mg, 5 mg, Intravenous, Q6H PRN, Shin Calhoun MD    metoprolol tartrate (LOPRESSOR) tablet 50 mg, 50 mg, Oral, Q12H [address], Kody Naylor MD, 50 mg at 03/19/22 2115    montelukast (SINGULAIR) tablet 10 mg, 10 mg, Oral, HS, Kody Naylor MD, 10 mg at 03/19/22 2115    nitroglycerin (NITROSTAT) SL tablet 0 4 mg, 0 4 mg, Sublingual, Q5 Min PRN, Kody Naylor MD    nystatin (MYCOSTATIN) powder, , Topical, BID, Kody Naylor MD, Given at 03/19/22 1903    ondansetron [facility]) injection 4 mg, 4 mg, Intravenous, Q6H PRN, Kody Naylor MD, 4 mg at 03/17/22 2148    pravastatin (PRAVACHOL) tablet 80 mg, 80 mg, Oral, Daily With Gino Pedro MD, 80 mg at 03/19/22 1658    pregabalin (LYRICA) capsule 100 mg, 100 mg, Oral, BID, Kody Naylor MD, 100 mg at 03/19/22 1725    traMADol (ULTRAM) tablet 50 mg, 50 mg, Oral, Q6H PRN, Mikey Rm PA-C, 50 mg at 03/17/22 2143    Laboratory Results:  Results from last 7 days   Lab Units 03/20/22  0609 03/19/22  0722 03/18/22  0650 03/17/22  0522 03/16/22  0616 03/15/22  0503 03/14/22  0515   WBC Thousand/uL  --   --  6 99  --   --  7 46 7 82   HEMOGLOBIN g/dL  --   --  8 8*  --   --  8 2* 8 8*   HEMATOCRIT %  --   --  30 7*  --   --  28 8* 29 6*   PLATELETS Thousands/uL  --   --  228  --   --  214 223   SODIUM mmol/L 141 145 146* 144 145 144 143   POTASSIUM mmol/L 4 1 4 5 5 1 5 2 5 3 4 8 4 5   CHLORIDE mmol/L 94* 98* 101 102 102 104 105   CO2 mmol/L 44* 44* 41* 35* 37* 38* 38*   BUN mg/dL 52* 51* 49* 53* 53* 40* 34*   CREATININE mg/dL 1 66* 1 65* 1 60* 1 83* 2 44* 2 16* 1 89*   CALCIUM mg/dL 8 2* 8 0* 7 9* 8 0* 7 6* 7 6* 7 6*       IR IN-Patient Thoracentesis   Final Result by Erica Harden MD (03/18 5856)   Impression:   1  Successful ultrasound-guided thoracentesis yielding 500 mL of pleural fluid from the right chest and 300 mL of pleural fluid from the left chest              Workstation performed: [id number]         CT chest wo contrast   Final Result by Mayra Baugh MD (03/17 9575)      Nothing to suggest pneumonia        Septal thickening and alveolar nodules due to interstitial and alveolar edema with persistent moderate effusions  Workstation performed: [id number]         XR chest portable   Final Result by Ha Perez MD (03/17 2227)      Persistent mild pulmonary edema with small effusions  Workstation performed: [id number]         US kidney and bladder   Final Result by Rufino Cameron MD (03/15 1505)      No hydronephrosis  Workstation performed: [id number]         XR chest portable   Final Result by Shahid Paredes MD (03/13 1304)      Pulmonary vascular congestion is similar to prior study  Workstation performed: [id number]         VAS upper limb venous duplex scan, unilateral/limited   Final Result by Noel Looney MD (03/10 8407)      XR chest portable   Final Result by Kerry Carnes MD (03/10 6393)      Increased pulmonary vascular congestion and development of small bilateral pleural effusions  Workstation performed: [id number]         CTA ED chest PE Study   Final Result by Jamel Modi MD (03/09 2032)         1  Small bilateral pleural effusions and bibasilar atelectasis  Few airspace opacities in the right lung could represent mild aspiration  2   No evidence of acute pulmonary embolus, thoracic aortic aneurysm or dissection  Workstation performed: [id number]         XR chest 1 view portable   Final Result by Steve Nguyen MD (03/10 6629)      Hypoventilation with subsegmental atelectasis at the bases  Developing edema or inflammatory infiltrates are difficult to exclude  Please see subsequent CT for further report                  Workstation performed: [id number]             Portions of the record may have been created with voice recognition software  Occasional wrong word or "sound a like" substitutions may have occurred due to the inherent limitations of voice recognition software   Read the chart carefully and recognize, using context, where substitutions have occurred

## 2022-03-20 NOTE — ASSESSMENT & PLAN NOTE
Wt Readings from Last 3 Encounters:   03/20/22 120 kg (264 lb 8 8 oz)   03/08/22 135 kg (297 lb 9 9 oz)   03/03/22 127 kg (279 lb 15 8 oz)     · SOB POA, secondary to acute on chronic diastolic CHF  BNP 5992  CT shows small b/l plerual effusions   · Recent shoulder surgery, SEBASTIAN, not on diuretics at SNF (previously on torsemide 10mg/d)  · ECHO performed 3/10 shows EF 15%, systolic function low normal, borderline concentric hypertrophy as per Cardiology report  · Procalcitonin negative - would hold off abx for aspiration related air space opacities seen on CT    · CTA chest negative for pulmonary embolism   · Patient had developed some SOB on 3/13; repeat chest xray continues to show pulmonary congestion  · Patient was on Lasix 60 milligram IV q 12 hours with albumin which was later changed to torsemide 20 milligram p o  Daily on 3/16/22  · Continue 2 liters oxygen  Patient arranged for home oxygen  · Continue metoprolol 50 mg q 12 hours  · Continue intake and output, daily weights  · Patient developed shortness of breath with wheezing again this morning which improved slightly with nebulizer treatment  Repeat chest x-ray showing persistent pulmonary vascular congestion  CT scan of the chest was ordered which showed septal thickening and alveolar nodules due to interstitial and alveolar edema with persistent moderate effusions  · Patient received Lasix 80 milligram IV and torsemide 20 milligram on March 17, 2022  · Patient has been started on Lasix drip at 10 milligram/hour on March 18, 2022  Continue Lasix drip at 10 milligram/hour for today  Possible transition to p o  Torsemide 40 milligram p o  B i d  tomorrow based on chest x-ray  · Patient underwent thoracentesis with removal of 500 milliliters of pleural fluid from the right and 300 mL from the left    Pleural fluid analysis suggesting transudative effusion  · Follow-up chest x-ray

## 2022-03-21 LAB
ANION GAP SERPL CALCULATED.3IONS-SCNC: 5 MMOL/L (ref 4–13)
BACTERIA SPEC BFLD CULT: NO GROWTH
BUN SERPL-MCNC: 57 MG/DL (ref 5–25)
CALCIUM SERPL-MCNC: 7.9 MG/DL (ref 8.3–10.1)
CHLORIDE SERPL-SCNC: 95 MMOL/L (ref 100–108)
CO2 SERPL-SCNC: 43 MMOL/L (ref 21–32)
CREAT SERPL-MCNC: 2.16 MG/DL (ref 0.6–1.3)
GFR SERPL CREATININE-BSD FRML MDRD: 22 ML/MIN/1.73SQ M
GLUCOSE SERPL-MCNC: 204 MG/DL (ref 65–140)
GLUCOSE SERPL-MCNC: 211 MG/DL (ref 65–140)
GLUCOSE SERPL-MCNC: 253 MG/DL (ref 65–140)
GLUCOSE SERPL-MCNC: 296 MG/DL (ref 65–140)
GLUCOSE SERPL-MCNC: 361 MG/DL (ref 65–140)
GRAM STN SPEC: NORMAL
GRAM STN SPEC: NORMAL
POTASSIUM SERPL-SCNC: 4.2 MMOL/L (ref 3.5–5.3)
SODIUM SERPL-SCNC: 143 MMOL/L (ref 136–145)

## 2022-03-21 PROCEDURE — 99232 SBSQ HOSP IP/OBS MODERATE 35: CPT | Performed by: INTERNAL MEDICINE

## 2022-03-21 PROCEDURE — 97530 THERAPEUTIC ACTIVITIES: CPT

## 2022-03-21 PROCEDURE — 82948 REAGENT STRIP/BLOOD GLUCOSE: CPT

## 2022-03-21 PROCEDURE — 97116 GAIT TRAINING THERAPY: CPT

## 2022-03-21 PROCEDURE — 80048 BASIC METABOLIC PNL TOTAL CA: CPT | Performed by: INTERNAL MEDICINE

## 2022-03-21 RX ORDER — INSULIN GLARGINE 100 [IU]/ML
36 INJECTION, SOLUTION SUBCUTANEOUS
Status: DISCONTINUED | OUTPATIENT
Start: 2022-03-21 | End: 2022-03-22 | Stop reason: HOSPADM

## 2022-03-21 RX ADMIN — NYSTATIN: 100000 POWDER TOPICAL at 09:57

## 2022-03-21 RX ADMIN — MONTELUKAST 10 MG: 10 TABLET, FILM COATED ORAL at 21:46

## 2022-03-21 RX ADMIN — INSULIN LISPRO 3 UNITS: 100 INJECTION, SOLUTION INTRAVENOUS; SUBCUTANEOUS at 17:24

## 2022-03-21 RX ADMIN — INSULIN GLARGINE 36 UNITS: 100 INJECTION, SOLUTION SUBCUTANEOUS at 21:45

## 2022-03-21 RX ADMIN — AMLODIPINE BESYLATE 5 MG: 5 TABLET ORAL at 09:54

## 2022-03-21 RX ADMIN — Medication: at 10:02

## 2022-03-21 RX ADMIN — APIXABAN 5 MG: 5 TABLET, FILM COATED ORAL at 17:57

## 2022-03-21 RX ADMIN — METOPROLOL TARTRATE 50 MG: 50 TABLET, FILM COATED ORAL at 21:46

## 2022-03-21 RX ADMIN — PREGABALIN 100 MG: 100 CAPSULE ORAL at 09:54

## 2022-03-21 RX ADMIN — APIXABAN 5 MG: 5 TABLET, FILM COATED ORAL at 09:54

## 2022-03-21 RX ADMIN — PRAVASTATIN SODIUM 80 MG: 80 TABLET ORAL at 15:59

## 2022-03-21 RX ADMIN — INSULIN LISPRO 1 UNITS: 100 INJECTION, SOLUTION INTRAVENOUS; SUBCUTANEOUS at 08:01

## 2022-03-21 RX ADMIN — Medication: at 18:20

## 2022-03-21 RX ADMIN — Medication 10 MG/HR: at 07:16

## 2022-03-21 RX ADMIN — METOPROLOL TARTRATE 50 MG: 50 TABLET, FILM COATED ORAL at 09:55

## 2022-03-21 RX ADMIN — DOCUSATE SODIUM 100 MG: 100 CAPSULE ORAL at 17:57

## 2022-03-21 RX ADMIN — INSULIN LISPRO 3 UNITS: 100 INJECTION, SOLUTION INTRAVENOUS; SUBCUTANEOUS at 21:47

## 2022-03-21 RX ADMIN — DOCUSATE SODIUM 100 MG: 100 CAPSULE ORAL at 09:53

## 2022-03-21 RX ADMIN — PREGABALIN 100 MG: 100 CAPSULE ORAL at 17:58

## 2022-03-21 RX ADMIN — NYSTATIN: 100000 POWDER TOPICAL at 17:59

## 2022-03-21 RX ADMIN — LIDOCAINE 5% 2 PATCH: 700 PATCH TOPICAL at 09:57

## 2022-03-21 RX ADMIN — INSULIN LISPRO 2 UNITS: 100 INJECTION, SOLUTION INTRAVENOUS; SUBCUTANEOUS at 12:54

## 2022-03-21 RX ADMIN — LORATADINE 10 MG: 10 TABLET ORAL at 09:54

## 2022-03-21 NOTE — ASSESSMENT & PLAN NOTE
· On arrival rapid a fib with RVR, responded to IV lopressor  · Continue metoprolol tartrate 50 mg PO BID at home   · DEMETRIS Vasc score 3, Dr Belinda Parker cleared to resume Eliquis    · Continue Eliquis 5 mg p o  B i d

## 2022-03-21 NOTE — PLAN OF CARE
Problem: Nutrition/Hydration-ADULT  Goal: Nutrient/Hydration intake appropriate for improving, restoring or maintaining nutritional needs  Description: Monitor and assess patient's nutrition/hydration status for malnutrition  Collaborate with interdisciplinary team and initiate plan and interventions as ordered  Monitor patient's weight and dietary intake as ordered or per policy  Utilize nutrition screening tool and intervene as necessary  Determine patient's food preferences and provide high-protein, high-caloric foods as appropriate       INTERVENTIONS:  - Monitor oral intake, urinary output, labs, and treatment plans  - Assess nutrition and hydration status and recommend course of action  - Evaluate amount of meals eaten  - Assist patient with eating if necessary   - Allow adequate time for meals  - Recommend/ encourage appropriate diets, oral nutritional supplements, and vitamin/mineral supplements  - Order, calculate, and assess calorie counts as needed  - Recommend, monitor, and adjust tube feedings and TPN/PPN based on assessed needs  - Assess need for intravenous fluids  - Provide specific nutrition/hydration education as appropriate  - Include patient/family/caregiver in decisions related to nutrition  Outcome: Progressing     Problem: Prexisting or High Potential for Compromised Skin Integrity  Goal: Skin integrity is maintained or improved  Description: INTERVENTIONS:  - Identify patients at risk for skin breakdown  - Assess and monitor skin integrity  - Assess and monitor nutrition and hydration status  - Monitor labs   - Turn and reposition patient  - Assist with mobility/ambulation  - Relieve pressure over bony prominences  - Avoid friction and shearing  - Provide appropriate hygiene as needed including keeping skin clean and dry  - Evaluate need for skin moisturizer/barrier cream  - Collaborate with interdisciplinary team   - Patient/family teaching    Outcome: Progressing     Problem: Potential for Falls  Goal: Patient will remain free of falls  Description: INTERVENTIONS:  - Educate patient/family on patient safety including physical limitations  - Instruct patient to call for assistance with activity   - Consult OT/PT to assist with strengthening/mobility   - Keep Call bell within reach  - Keep bed low and locked with side rails adjusted as appropriate  - Keep care items and personal belongings within reach  - Initiate and maintain comfort rounds  - Make Fall Risk Sign visible to staff    - Initiate/Maintain alarm  - Obtain necessary fall risk management equipment:   - Apply yellow socks and bracelet for high fall risk patients  - Consider moving patient to room near nurses station  Outcome: Progressing     Problem: MOBILITY - ADULT  Goal: Maintain or return to baseline ADL function  Description: INTERVENTIONS:  - Educate patient/family on patient safety including physical limitations  - Instruct patient to call for assistance with activity   - Consult OT/PT to assist with strengthening/mobility   - Keep Call bell within reach  - Keep bed low and locked with side rails adjusted as appropriate  - Keep care items and personal belongings within reach  - Initiate and maintain comfort rounds  - Make Fall Risk Sign visible to staff    - Apply yellow socks and bracelet for high fall risk patients  - Consider moving patient to room near nurses station  Outcome: Progressing     Problem: PAIN - ADULT  Goal: Verbalizes/displays adequate comfort level or baseline comfort level  Description: Interventions:  - Encourage patient to monitor pain and request assistance  - Assess pain using appropriate pain scale  - Administer analgesics based on type and severity of pain and evaluate response  - Implement non-pharmacological measures as appropriate and evaluate response  - Consider cultural and social influences on pain and pain management  - Notify physician/advanced practitioner if interventions unsuccessful or patient reports new pain  Outcome: Progressing     Problem: INFECTION - ADULT  Goal: Absence or prevention of progression during hospitalization  Description: INTERVENTIONS:  - Assess and monitor for signs and symptoms of infection  - Monitor lab/diagnostic results  - Monitor all insertion sites, i e  indwelling lines, tubes, and drains  - Monitor endotracheal if appropriate and nasal secretions for changes in amount and color  - [place] appropriate cooling/warming therapies per order  - Administer medications as ordered  - Instruct and encourage patient and family to use good hand hygiene technique  - Identify and instruct in appropriate isolation precautions for identified infection/condition  Outcome: Progressing     Problem: SAFETY ADULT  Goal: Patient will remain free of falls  Description: INTERVENTIONS:  - Educate patient/family on patient safety including physical limitations  - Instruct patient to call for assistance with activity   - Consult OT/PT to assist with strengthening/mobility   - Keep Call bell within reach  - Keep bed low and locked with side rails adjusted as appropriate  - Keep care items and personal belongings within reach  - Initiate and maintain comfort rounds  - Make Fall Risk Sign visible to staff    - Initiate/Maintain alarm  - Obtain necessary fall risk management equipment:   - Apply yellow socks and bracelet for high fall risk patients  - Consider moving patient to room near nurses station  Outcome: Progressing  Goal: Maintain or return to baseline ADL function  Description: INTERVENTIONS:  - Educate patient/family on patient safety including physical limitations  - Instruct patient to call for assistance with activity   - Consult OT/PT to assist with strengthening/mobility   - Keep Call bell within reach  - Keep bed low and locked with side rails adjusted as appropriate  - Keep care items and personal belongings within reach  - Initiate and maintain comfort rounds  - Make Fall Risk Sign visible to staff    - Apply yellow socks and bracelet for high fall risk patients  - Consider moving patient to room near nurses station  Outcome: Progressing  Goal: Maintains/Returns to pre admission functional level  Description: INTERVENTIONS:  - Perform BMAT or MOVE assessment daily    - Set and communicate daily mobility goal to care team and patient/family/caregiver  - Collaborate with rehabilitation services on mobility goals if consulted  - Perform Range of Motion 2 times a day  - Reposition patient every 2 hours  - Dangle patient 3 times a day  - Stand patient 3 times a day  - Ambulate patient 3 times a day  - Out of bed to chair 3 times a day   - Out of bed for meals 3 times a day  - Out of bed for toileting  - Record patient progress and toleration of activity level   Outcome: Progressing     Problem: DISCHARGE PLANNING  Goal: Discharge to home or other facility with appropriate resources  Description: INTERVENTIONS:  - Identify barriers to discharge w/patient and caregiver  - Arrange for needed discharge resources and transportation as appropriate  - Identify discharge learning needs (meds, wound care, etc )  - Arrange for interpretive services to assist at discharge as needed  - Refer to Case Management Department for coordinating discharge planning if the patient needs post-hospital services based on physician/advanced practitioner order or complex needs related to functional status, cognitive ability, or social support system  Outcome: Progressing     Problem: Knowledge Deficit  Goal: Patient/family/caregiver demonstrates understanding of disease process, treatment plan, medications, and discharge instructions  Description: Complete learning assessment and assess knowledge base    Interventions:  - Provide teaching at level of understanding  - Provide teaching via preferred learning methods  Outcome: Progressing     Problem: CARDIOVASCULAR - ADULT  Goal: Maintains optimal cardiac output and hemodynamic stability  Description: INTERVENTIONS:  - Monitor I/O, vital signs and rhythm  - Monitor for S/S and trends of decreased cardiac output  - Assess quality of pulses, skin color and temperature  - Instruct patient to report change in severity of symptoms  Outcome: Progressing     Problem: RESPIRATORY - ADULT  Goal: Achieves optimal ventilation and oxygenation  Description: INTERVENTIONS:  - Assess for changes in respiratory status  - Assess for changes in mentation and behavior  - Position to facilitate oxygenation and minimize respiratory effort  - Oxygen administered by appropriate delivery if ordered  - Initiate smoking cessation education as indicated  - Encourage broncho-pulmonary hygiene including cough, deep breathe, Incentive Spirometry  - Assess the need for suctioning and aspirate as needed  - Assess and instruct to report SOB or any respiratory difficulty  - Respiratory Therapy support as indicated  Outcome: Progressing

## 2022-03-21 NOTE — PROGRESS NOTES
Progress Note - Cardiology   [facility]     Aleyda Lovett 73 y o  female MRN: [id number]  : 1950  Unit/Bed#: [address] [place] [address] Encounter: [id number]    Assessment and Plan:   1  Acute on chronic diastolic heart failure:  Noted patient was not discharged on her home dose of torsemide    -  patient diuretics were escalated to Lasix  drip at 10 milligrams/hour over the weekend, it was discontinued today by primary care team  Weight today is 263    -  diuretics managed per Nephrology    -  continue Lopressor 50 mg q 12 hours    -  continue Norvasc 5 mg once a day    -  low-sodium diet    -  I&O, daily weights and monitor labs     2  Acute kidney injury on chronic kidney disease:  Previous baseline creatinine was 1 4 to 1 6 in 2019  Catherine Pritchard-  followed by Nephrology     3  Superficial thrombophlebitis of left cephalic vein:  Eliquis resumed     4  Paroxysmal atrial fibrillation:  Currently sinus rhythm    -  Eliquis 5 mg b i d  Was resumed during this admission    -  continue Lopressor 50 mg q 12 hours      5  Hypertension:  Blood pressure stable on Lopressor 50 mg b i d  And Norvasc 5 mg daily    -  continue to monitor    6  Bilateral pleural effusions:  Patient underwent bilateral thoracentesis on 2022 with removal of 500 mL on right and 300 mL on left      7  Postop Anemia:  Hemoglobin stable     8  Left total shoulder replacement:  2022     Subjective / Objective:   Patient seen and examined  She states her breathing has greatly improved since admission  Her weight is down to 263 lb which she states is much lower than it was at home  Lasix drip discontinued by primary team today  Vitals: Blood pressure 139/66, pulse 72, temperature 97 7 °F (36 5 °C), temperature source Oral, resp  rate 17, height 5' 2" (1 575 m), weight 119 kg (263 lb 5 oz), SpO2 98 %, not currently breastfeeding    Vitals:    22 0549 22 0553   Weight: 119 kg (263 lb) 119 kg (263 lb 5 oz)     Body mass index is 48 16 kg/m²  BP Readings from Last 3 Encounters:   03/21/22 139/66   03/09/22 144/66   03/07/22 164/69     Orthostatic Blood Pressures      Most Recent Value   Blood Pressure 139/66 filed at 03/21/2022 0803   Patient Position - Orthostatic VS Lying filed at 03/21/2022 0803        I/O       03/19 0701  03/20 0700 03/20 0701  03/21 0700 03/21 0701  03/22 0700    P  O   930     I V  (mL/kg) 10 (0 1) 10 (0 1)     Total Intake(mL/kg) 10 (0 1) 940 (7 9)     Urine (mL/kg/hr) 2610 (0 9) 2250 (0 8)     Other       Stool 0      Total Output 2610 2250     Net -2600 -1310            Unmeasured Stool Occurrence 2 x          Invasive Devices  Report    Peripheral Intravenous Line            Peripheral IV 03/17/22 Right Forearm 3 days          Drain            External Urinary Catheter 7 days                  Intake/Output Summary (Last 24 hours) at 3/21/2022 0843  Last data filed at 3/21/2022 0600  Gross per 24 hour   Intake 240 ml   Output 2100 ml   Net -1860 ml         Physical Exam:   Physical Exam  Vitals and nursing note reviewed  Constitutional:       General: She is not in acute distress  Appearance: Normal appearance  She is obese  HENT:      Right Ear: External ear normal       Left Ear: External ear normal       Nose: Nose normal    Eyes:      General: No scleral icterus  Right eye: No discharge  Left eye: No discharge  Cardiovascular:      Rate and Rhythm: Normal rate and regular rhythm  Pulses: Normal pulses  Heart sounds: Normal heart sounds  No murmur heard  Pulmonary:      Effort: Pulmonary effort is normal  No respiratory distress  Breath sounds: Normal breath sounds  Abdominal:      General: Bowel sounds are normal  There is no distension  Palpations: Abdomen is soft  Musculoskeletal:      Right lower leg: Edema present  Left lower leg: Edema present  Comments: Trace to +1 edema   Skin:     General: Skin is warm and dry        Capillary Refill: Capillary refill takes less than 2 seconds  Neurological:      General: No focal deficit present  Mental Status: She is alert and oriented to person, place, and time  Mental status is at baseline                     Medications/ Allergies:     Current Facility-Administered Medications   Medication Dose Route Frequency Provider Last Rate    aluminum-magnesium hydroxide-simethicone  30 mL Oral Q6H PRN Velvet Morales MD      amLODIPine  5 mg Oral Daily Velvet Morales MD      ammonium lactate   Topical BID Velvet Morales MD      apixaban  5 mg Oral BID Velvet Morales MD      docusate sodium  100 mg Oral BID Marilyn Gutierrez MD      furosemide  10 mg/hr Intravenous Continuous Shashi Garcia MD 10 mg/hr (03/21/22 0716)    insulin glargine  32 Units Subcutaneous HS Marilyn Gutierrez MD      insulin lispro  1-5 Units Subcutaneous TID AC Velvet Morales MD      insulin lispro  1-5 Units Subcutaneous HS Velvet Morales MD      lidocaine  2 patch Topical Daily Velvet Morales MD      loratadine  10 mg Oral Daily Velvet Morales MD      magnesium hydroxide  30 mL Oral Daily PRN Velvet Morales MD      metoprolol  5 mg Intravenous Q6H PRN Velvet Morales MD      metoprolol tartrate  50 mg Oral Q12H Thermon Given, MD      montelukast  10 mg Oral HS Velvet Morales MD      nitroglycerin  0 4 mg Sublingual Q5 Min PRN Velvet Morales MD      nystatin   Topical BID Velvet Morales MD      ondansetron  4 mg Intravenous Q6H PRN Velvet Morales MD      pravastatin  80 mg Oral Daily With Magno Hunt MD      pregabalin  100 mg Oral BID Velvet Morales MD      traMADol  50 mg Oral Q6H PRN Mikey Rm PA-C       aluminum-magnesium hydroxide-simethicone, 30 mL, Q6H PRN  magnesium hydroxide, 30 mL, Daily PRN  metoprolol, 5 mg, Q6H PRN  nitroglycerin, 0 4 mg, Q5 Min PRN  ondansetron, 4 mg, Q6H PRN  traMADol, 50 mg, Q6H PRN      Allergies   Allergen Reactions    Penicillins Hives    Moxifloxacin Other (See Comments)     unknown    Percocet [Oxycodone-Acetaminophen] Other (See Comments)     unknown    Zinc Acetate Other (See Comments)     unknown    Nuts - Food Allergy Other (See Comments)    Asa [Aspirin] GI Intolerance    Indocin [Indomethacin] Other (See Comments)     Made patient "loopy"    Other Other (See Comments)     unknown       VTE Pharmacologic Prophylaxis:   Sequential compression device (Venodyne)  and Eliquis    Labs:   Troponins:        CBC with diff:  Results from last 7 days   Lab Units 03/18/22  0650 03/15/22  0503   WBC Thousand/uL 6 99 7 46   HEMOGLOBIN g/dL 8 8* 8 2*   HEMATOCRIT % 30 7* 28 8*   MCV fL 99* 101*   PLATELETS Thousands/uL 228 214   MCH pg 28 4 28 8   MCHC g/dL 28 7* 28 5*   RDW % 15 9* 15 8*   MPV fL 10 8 10 2   NRBC AUTO /100 WBCs  --  0       CMP:  Results from last 7 days   Lab Units 03/21/22  0612 03/20/22  0609 03/19/22  0722 03/18/22  0650 03/17/22  0522 03/16/22  0616 03/15/22  0503   SODIUM mmol/L 143 141 145 146* 144 145 144   POTASSIUM mmol/L 4 2 4 1 4 5 5 1 5 2 5 3 4 8   CHLORIDE mmol/L 95* 94* 98* 101 102 102 104   CO2 mmol/L 43* 44* 44* 41* 35* 37* 38*   ANION GAP mmol/L 5 3* 3* 4 7 6 2*   BUN mg/dL 57* 52* 51* 49* 53* 53* 40*   CREATININE mg/dL 2 16* 1 66* 1 65* 1 60* 1 83* 2 44* 2 16*   CALCIUM mg/dL 7 9* 8 2* 8 0* 7 9* 8 0* 7 6* 7 6*   AST U/L  --   --   --   --   --   --  12   ALT U/L  --   --   --   --   --   --  14   ALK PHOS U/L  --   --   --   --   --   --  85   TOTAL PROTEIN g/dL  --   --   --   --   --  6 0* 5 9*   ALBUMIN g/dL  --   --   --   --   --   --  2 9*   TOTAL BILIRUBIN mg/dL  --   --   --   --   --   --  0 23   EGFR ml/min/1 73sq m 22 31 31 32 27 19 22         Imaging & Testing   I have personally reviewed pertinent reports  XR chest portable    Result Date: 3/21/2022  Narrative: CHEST INDICATION:   Follow-up CHF  COMPARISON:  March 17, 2022 EXAM PERFORMED/VIEWS:  XR CHEST PORTABLE FINDINGS: Cardiomediastinal silhouette appears unremarkable  Mild pulmonary vascular congestive changes, slightly improved from March 17, 2022  Trace right costophrenic angle effusion  No pneumothorax  Left shoulder arthroplasty  Impression: Improvement in mild pulmonary vascular congestive change  Trace right costophrenic angle effusion  Workstation performed: [id number]     XR chest portable    Result Date: 3/17/2022  Narrative: CHEST INDICATION:   CHF  COMPARISON:  Chest radiograph from 3/13/2022 and 3/10/2022, chest CT from 3/9/2022  EXAM PERFORMED/VIEWS:  XR CHEST PORTABLE FINDINGS: Cardiomediastinal silhouette appears unremarkable  Persistent pulmonary edema with small effusions  No pneumothorax  Reverse left shoulder arthroplasty  Impression: Persistent mild pulmonary edema with small effusions  Workstation performed: [id number]     XR chest portable    Result Date: 3/13/2022  Narrative: CHEST INDICATION:   sob  COMPARISON:  Chest radiograph March 10, 2022  Correlation with chest CT March 9, 2022 EXAM PERFORMED/VIEWS:  XR CHEST PORTABLE FINDINGS: Cardiomediastinal silhouette appears unremarkable  Persistent mild prominence of the interstitial markings  Limited assessment for pleural effusions; no large pleural effusion  No pneumothorax  Partially imaged left shoulder reverse arthroplasty  Impression: Pulmonary vascular congestion is similar to prior study  Workstation performed: [id number]     XR chest portable    Result Date: 3/10/2022  Narrative: CHEST INDICATION:   sob  fu on status of fluid overload  COMPARISON:  3/9/2022  EXAM PERFORMED/VIEWS:  XR CHEST PORTABLE Images:  1 FINDINGS: Cardiac and mediastinal contours are stable and within normal limits  The aorta is calcified  There is increased pulmonary vascular congestion and development of small bilateral pleural effusions  No pneumothorax or focal airspace consolidation  Left shoulder reverse total arthroplasty partially seen       Impression: Increased pulmonary vascular congestion and development of small bilateral pleural effusions  Workstation performed: [id number]     XR chest 1 view portable    Result Date: 3/10/2022  Narrative: CHEST INDICATION:   shortness of breath  COMPARISON:  3/8/2022 ; 7/31/2019 EXAM PERFORMED/VIEWS:  XR CHEST PORTABLE FINDINGS:  Mild hypoventilation is present  Cardiomediastinal silhouette appears unremarkable  Subsegmental atelectasis at the bases is noted probably related to hypoventilation although some developing edema is difficult to exclude  No pneumothorax or pleural effusion  Left shoulder replacement is present  Impression: Hypoventilation with subsegmental atelectasis at the bases  Developing edema or inflammatory infiltrates are difficult to exclude  Please see subsequent CT for further report Workstation performed: [id number]     XR chest 1 view portable    Result Date: 3/9/2022  Narrative: CHEST INDICATION:   chest pain  COMPARISON:  7/31/2019 EXAM PERFORMED/VIEWS:  XR CHEST PORTABLE Single view FINDINGS: Cardiomediastinal silhouette appears unremarkable  The lungs are clear  No pneumothorax or pleural effusion  Reverse left shoulder arthroplasty has been performed since the prior study     Impression: No acute cardiopulmonary disease  Findings are stable except for left shoulder arthroplasty Workstation performed: [id number]     XR shoulder left 1 view    Result Date: 3/3/2022  Narrative: LEFT SHOULDER INDICATION:   post op  COMPARISON:  2/23/2022 VIEWS:  XR SHOULDER 1 VW LEFT FINDINGS: There is a reverse left shoulder arthroplasty  Components are in anatomic alignment  There are expected postoperative changes  Impression: Anatomic alignment of left shoulder reverse arthroplasty   Workstation performed: [id number]       CT chest wo contrast    Result Date: 3/17/2022  Narrative: CT CHEST WITHOUT IV CONTRAST INDICATION: "Bilateral infiltrates rule out pneumonia " Per my review of the medical record, the patient has acute on chronic kidney disease and CHF exacerbation  COMPARISON:  Chest radiograph from 3/17/2022 and chest CT from 3/9/2022  TECHNIQUE: Chest CT without intravenous contrast   Axial, sagittal, coronal 2D reformats and coronal MIPS from source data  Radiation dose length product (DLP):  941 09 mGy-cm   Radiation dose exposure minimized using iterative reconstruction and automated exposure control  FINDINGS: LUNGS:  Septal thickening with scattered alveolar nodularity due to mild alveolar and interstitial edema  Resolving nodular airspace opacity in the superior segment right lower lobe  Mild dependent atelectasis in both lower lobes  AIRWAYS: No significant filling defects  PLEURA:  Stable moderate effusions  HEART/GREAT VESSELS:  Normal heart size  Mild coronary artery calcification indicating atherosclerotic heart disease  MEDIASTINUM AND LUIGI:  Unremarkable  CHEST WALL AND LOWER NECK: Unremarkable  UPPER ABDOMEN:  Unremarkable  OSSEOUS STRUCTURES: Mild degenerative disease in the spine  Left shoulder arthroplasty  Impression: Nothing to suggest pneumonia  Septal thickening and alveolar nodules due to interstitial and alveolar edema with persistent moderate effusions  Workstation performed: [id number]     IR IN-Patient Thoracentesis    Result Date: 3/18/2022  Narrative: Ultrasound-guided thoracentesis Clinical History: 71-year-old female with history of bilateral pleural effusions    Technique: The patient was brought to the interventional radiology area and placed in the sitting position on the side of a stretcher  The bilateral chest was then examined with ultrasound and the skin was marked  The skin was then prepped, and draped in usual sterile fashion  Lidocaine was administered to the skin and a small skin incision was made at each site  Under direct ultrasound guidance, a 5 [place] Yueh multisidehole catheter was advanced into each pleural space   500 mL of red pleural fluid was drained from the right chest and 300 mL of ange-colored pleural fluid was drained from the left chest  The patient tolerated the procedure well and suffered no complications  Impression: Impression: 1  Successful ultrasound-guided thoracentesis yielding 500 mL of pleural fluid from the right chest and 300 mL of pleural fluid from the left chest  Workstation performed: [id number]     VAS upper limb venous duplex scan, unilateral/limited    Result Date: 3/10/2022  Narrative:  [facility] REPORT CLINICAL: Indications:  Patient presents with left upper extremity edema x  several days  S/P left shoulder surgery 3/1/2022  Operative History: Patient denies any cardiovascular surgeries  Risk Factors The patient has history of HTN, Diabetes (Yes), Hyperlipidemia, CKD and previous smoking (quit >10yrs ago)  FINDINGS:  Left          Thrombus  Ceph Mid Arm  Acute        CONCLUSION: Impression  RIGHT UPPER LIMB LIMITED: Evaluation shows no evidence of thrombus in the internal jugular vein, subclavian vein, and the brachiocephalic vein  LEFT UPPER LIMB: Evidence of superficial thrombophlebitis noted in the cephalic vein  No evidence of acute or chronic deep vein thrombosis  Doppler evaluation shows a normal response to augmentation maneuvers  Technically difficult/limited study due to immobile shoulder, pitting edema and poor visualization of deep system  SIGNATURE: Electronically Signed by: Jackelyn Portillo MD on 2022-03-10 06:23:16 PM    US kidney and bladder    Result Date: 3/15/2022  Narrative: RENAL ULTRASOUND INDICATION:   SEBASTIAN  COMPARISON: 7/31/2019 TECHNIQUE:   Ultrasound of the retroperitoneum was performed with a curvilinear transducer utilizing volumetric sweeps and still imaging techniques  FINDINGS: KIDNEYS: Symmetric and normal size  Right kidney:  10 7 x 5 0 x 6 2 cm  Volume 176 6 mL Left kidney:  11 2 x 6 4 x 5 5 cm  Volume 206 5 mL Right kidney Normal echogenicity and contour  No mass is identified  No hydronephrosis  No shadowing calculi  No perinephric fluid collections  Left kidney Normal echogenicity and contour  No mass is identified  No hydronephrosis  No shadowing calculi  No perinephric fluid collections  URETERS: Nonvisualized  BLADDER: The bladder is underdistended and suboptimally evaluated  Impression: No hydronephrosis  Workstation performed: [id number]     CTA ED chest PE Study    Result Date: 3/9/2022  Narrative: CTA - CHEST WITH IV CONTRAST - PULMONARY ANGIOGRAM INDICATION:   Shortness of breath  COMPARISON: 3/9/2022  TECHNIQUE: CTA examination of the chest was performed using angiographic technique according to a protocol specifically tailored to evaluate for pulmonary embolism  Axial, sagittal, and coronal 2D reformatted images were created from the source data and  submitted for interpretation  In addition, coronal 3D MIP postprocessing was performed on the acquisition scanner  Radiation dose length product (DLP) for this visit:  684 94 mGy-cm   This examination, like all CT scans performed in the [facility], was performed utilizing techniques to minimize radiation dose exposure, including the use of iterative  reconstruction and automated exposure control  IV Contrast:  85 mL of iohexol (OMNIPAQUE)  FINDINGS: PULMONARY ARTERIAL TREE:  No filling defect in the visualized pulmonary arteries to suggest acute embolus  LUNGS:  Bibasilar atelectasis  Few small airspace opacities scattered throughout the right lung could represent mild aspiration  No pulmonary interstitial edema  There is no tracheal or endobronchial lesion  PLEURA:  Small bilateral pleural effusions  HEART/GREAT VESSELS: Mild cardiomegaly  Aberrant right subclavian artery  No thoracic aortic aneurysm or dissection  MEDIASTINUM AND LUIGI:  No lymphadenopathy  CHEST WALL AND LOWER NECK:   Unremarkable  VISUALIZED STRUCTURES IN THE UPPER ABDOMEN:  Unremarkable  OSSEOUS STRUCTURES:  No acute fracture or destructive osseous lesion  Impression: 1  Small bilateral pleural effusions and bibasilar atelectasis  Few airspace opacities in the right lung could represent mild aspiration  2   No evidence of acute pulmonary embolus, thoracic aortic aneurysm or dissection  Workstation performed: [id number]     [address] [facility] bedside procedure    Result Date: 3/1/2022  Narrative: 1 2 840 [id number]  6 [id number]  9 [id number]    Echo complete w/ contrast if indicated    Result Date: 3/11/2022  Narrative: Exie Sanford  Left Ventricle: Left ventricular cavity size is normal  Wall thickness is mildly increased  The left ventricular ejection fraction is 53% by biplane measurement  Systolic function is low normal  Wall motion is normal  There is borderline concentric hypertrophy    Right Ventricle: Systolic function is low normal  Normal tricuspid annular plane systolic excursion (TAPSE) > 1 7 cm    Study Details: This transthoracic echocardiogram was performed at bedside  This was a routine, inpatient study  Study quality was poor  This was a technically difficult study due to decreased penetration, restricted mobility and poor acoustic windows  A complete 2D, color flow Doppler, spectral Doppler, 2D, color flow Doppler and spectral Doppler transthoracic echocardiogram was performed  The apical, parasternal, subcostal and suprasternal views were obtained  0 6 ml/min of Definity ultrasound enhancing agent was used due to opacify the left ventricle  Leane Pacer  Prior TTE study available for comparison  Prior study date: 5/29/2019  No significant changes noted compared to the prior study  Dee Ashley  Cardiology      "This note has been constructed using a voice recognition system  Therefore there may be syntax, spelling, and/or grammatical errors   Please call if you have any questions  "

## 2022-03-21 NOTE — PHYSICAL THERAPY NOTE
PT TREATMENT     03/21/22 1522   PT Last Visit   PT Visit Date 03/21/22   Note Type   Note Type Treatment   Pain Assessment   Pain Assessment Tool 0-10   Pain Score 5   Pain Location/Orientation Orientation: Left; Location: Shoulder   Restrictions/Precautions   LUE Weight Bearing Per Order NWB   Braces or Orthoses Sling   Other Precautions Fall Risk;O2   General   Chart Reviewed Yes   Subjective   Subjective Better today   Transfers   Sit to Stand 5  Supervision   Additional items Verbal cues   Stand to Sit 5  Supervision   Additional items Verbal cues   Toilet transfer 5  Supervision   Additional items Verbal cues  (Pt is dependent for hygiene after urination)   Ambulation/Elevation   Gait pattern Wide ONUR  (Slow, steady kameron)   Gait Assistance   (Close S)   Additional items Assist x 1;Verbal cues; Tactile cues   Assistive Device SPC   Distance 50 ft with change in direction x3 reps with rest in between each walk   Balance   Static Sitting Good   Static Standing Fair +   Dynamic Standing Fair   Ambulatory   (F to occasional F-)   Activity Tolerance   Activity Tolerance Patient limited by fatigue   Assessment   Assessment Pt is making steady progress with transfers, gait with a single-point cane, gait endurance, balance, and functional mobility/toileting  Pt will benefit from progression of gait and mobility as tolerated  Pt remains appropriate for home PT when medically stable for discharge  The patient's AM-[facility] Basic Mobility Inpatient Short Form Raw Score is 15  A Raw score of less than or equal to 16 suggests the patient may benefit from discharge to post-acute rehabilitation services  Please also refer to the recommendation of the Physical Therapist for safe discharge planning  Despite ampac score, pt is safe for discharge home with home PT and family support  Pt will benefit from continued ambulation with a cane as well     Plan   Treatment/Interventions ADL retraining;Functional transfer training;LE strengthening/ROM; Therapeutic exercise; Endurance training;Patient/family training;Equipment eval/education; Bed mobility;Gait training; Compensatory technique education   PT Frequency Other (Comment)  (5x/wk)   Recommendation   PT Discharge Recommendation Home with home health rehabilitation   Luna Aguilar 435   Turning in Bed Without Bedrails 2   Lying on Back to Sitting on Edge of Flat Bed 2   Moving Bed to Chair 3   Standing Up From Chair 3   Walk in Room 3   Climb 3-5 Stairs 2   Basic Mobility Inpatient Raw Score 15   Basic Mobility Standardized Score 36 97   Highest Level Of Mobility   -[facility] Goal 4: Move to chair/commode   [facility] Highest Level of Mobility 7: Walk 25 feet or more   -HL Goal Achieved Yes   Education   Education Provided Mobility training;Assistive device   Patient Demonstrates acceptance/verbal understanding;Explanation/teachback used;Demonstrates verbal understanding   End of Consult   Patient Position at End of Consult Bedside chair; All needs within reach   [facility] Insurance Number  Chrisjonathan Page PT [id number]     Portions of the documentation may have been created using voice recognition software  Occasional wrong word or sound alike substitutions may have occurred due to the inherent limitation of the voice recognition software  Read the chart carefully and recognize, using context, where substitutions have occurred

## 2022-03-21 NOTE — PROGRESS NOTES
[address]  Progress Note Tex Talamantes 1950, 73 y o  female MRN: [id number]  Unit/Bed#: [address] [place] [address] Encounter: [id number]  Primary Care Provider: Paula Craig   Date and time admitted to hospital: 3/9/2022  4:26 PM    * Acute diastolic congestive heart failure [facility])  Assessment & Plan  Wt Readings from Last 3 Encounters:   03/21/22 119 kg (263 lb 5 oz)   03/08/22 135 kg (297 lb 9 9 oz)   03/03/22 127 kg (279 lb 15 8 oz)     · SOB POA, secondary to acute on chronic diastolic CHF  BNP 5992  CT shows small b/l plerual effusions   · Recent shoulder surgery, SEBASTIAN, not on diuretics at SNF (previously on torsemide 10mg/d)  · ECHO performed 3/10 shows EF 23%, systolic function low normal, borderline concentric hypertrophy as per Cardiology report  · Procalcitonin negative - would hold off abx for aspiration related air space opacities seen on CT    · CTA chest negative for pulmonary embolism   · Patient had developed some SOB on 3/13; repeat chest xray continues to show pulmonary congestion  · Patient was on Lasix 60 milligram IV q 12 hours with albumin which was later changed to torsemide 20 milligram p o  Daily on 3/16/22  · Continue 2 liters oxygen  Patient arranged for home oxygen  · Continue metoprolol 50 mg q 12 hours  · Continue intake and output, daily weights  · Patient developed shortness of breath with wheezing again this morning which improved slightly with nebulizer treatment  Repeat chest x-ray showing persistent pulmonary vascular congestion  CT scan of the chest was ordered which showed septal thickening and alveolar nodules due to interstitial and alveolar edema with persistent moderate effusions  · Patient received Lasix 80 milligram IV and torsemide 20 milligram on March 17, 2022  · Patient has been started on Lasix drip at 10 milligram/hour on March 18, 2022  Continue Lasix drip at 10 milligram/hour for today     · Patient underwent thoracentesis with removal of 500 milliliters of pleural fluid from the right and 300 mL from the left  Pleural fluid analysis suggesting transudative effusion  · Chest x-ray showing improving pulmonary vascular congestion with trace right costophrenic angle effusion  · Lasix drip was stopped today as creatinine is increasing  Possible transition to p o  Torsemide in a m  SEBASTIAN (acute kidney injury) ([facility] [address] )  Assessment & Plan  · Creatinine continues to get worse and is up to 2 6  · Baseline Cr 1 4-1 6 with multiple episodes of SEBASTIAN  · Likely secondary to CRS in setting of CHF exacerbation complicated with contrast induced nephropathy  · Had CT contrast 3/9  · Nephrology consulted:  · Renal ultrasound showed no hydronephrosis  · Avoid nephrotoxins and hypotension  · Monitor BMP  · No urgent indication for dialysis  · Patient was on Lasix 60 milligram IV q 12 hours which was changed to torsemide 20 milligram p o  Daily   · Might need accept higher creatinine to maintain euvolemic state  · Patient was started on Lasix drip at 10 milligram/hour with stable creatinine   · Creatinine slightly increased from 1 6-2 1 today  Lasix drip was stopped this morning  · Possible transition to p o  Diuretics over the next 24 hours based on creatinine    Paroxysmal atrial fibrillation (HCC)  Assessment & Plan  · On arrival rapid a fib with RVR, responded to IV lopressor  · Continue metoprolol tartrate 50 mg PO BID at home   · DEMETRIS Vasc score 3, Dr Markos Thorne cleared to resume Eliquis    · Continue Eliquis 5 mg p o  B i d  Anemia  Assessment & Plan  · Hgb stable, likely worsened secondary to post op loss   · Hemoglobin has been stable in the range of 8    Status post reverse total replacement of left shoulder  Assessment & Plan  · S/p fall resulting in Comminuted displaced fracture of the proximal humerus (2/23/22)    · S/p left shoulder reverse complete arthroplasty (3/1/22)   · Cleared from ortho standpoint for patient to start Eliquis   · LUE venous duplex is negative for DVT  · PT / OT consults appreciated; recommending home with therapy      Diabetes mellitus, type 2 [facility])  Assessment & Plan  Lab Results   Component Value Date    HGBA1C 6 8 (H) 2022     Recent Labs     22  0706 22  1128 22  1601   POCGLU 362* 204* 253* 296*     Blood Sugar Average: Last 72 hrs:  (P) 279 4   · Blood sugars continued remained uncontrolled  Will increase Lantus to 36 units daily  · A1c indicated good control   · Continue Humalog sliding scale    Essential hypertension  Assessment & Plan  · On lopressor PO; increased to 50 mg p o  Q 12 hours during hospitalization  · Continue Norvasc          VTE Pharmacologic Prophylaxis: VTE Score: 17 High Risk (Score >/= 5) - Pharmacological DVT Prophylaxis Ordered: apixaban (Eliquis)  Sequential Compression Devices Ordered  Patient Centered Rounds: I performed bedside rounds with nursing staff today  Discussions with Specialists or Other Care Team Provider: Yes    Education and Discussions with Family / Patient: yes    Time Spent for Care: 45 minutes  More than 50% of total time spent on counseling and coordination of care as described above  Current Length of Stay: 12 day(s)  Current Patient Status: Inpatient   Certification Statement: The patient will continue to require additional inpatient hospital stay due to Acute CHF, acute kidney injury  Discharge Plan: Anticipate discharge in 24-48 hrs to home with home services  Code Status: Level 1 - Full Code    Subjective:   Patient is feeling much better  Improved shortness of breath  Denies any leg swelling, abdominal pain, nausea or vomiting  Objective:     Vitals:   Temp (24hrs), Av 6 °F (36 4 °C), Min:97 5 °F (36 4 °C), Max:97 7 °F (36 5 °C)    Temp:  [97 5 °F (36 4 °C)-97 7 °F (36 5 °C)] 97 6 °F (36 4 °C)  HR:  [66-85] 66  Resp:  [17-18] 17  BP: (110-139)/(50-66) 134/66  SpO2:  [92 %-100 %] 100 %  Body mass index is 48 16 kg/m²  Input and Output Summary (last 24 hours): Intake/Output Summary (Last 24 hours) at 3/21/2022 1904  Last data filed at 3/21/2022 1459  Gross per 24 hour   Intake 420 ml   Output 850 ml   Net -430 ml       Physical Exam:   Physical Exam  Constitutional:       Appearance: Normal appearance  HENT:      Head: Normocephalic and atraumatic  Nose: Nose normal       Mouth/Throat:      Mouth: Mucous membranes are moist       Pharynx: Oropharynx is clear  Eyes:      Extraocular Movements: Extraocular movements intact  Pupils: Pupils are equal, round, and reactive to light  Cardiovascular:      Rate and Rhythm: Normal rate and regular rhythm  Pulmonary:      Effort: Pulmonary effort is normal       Breath sounds: Normal breath sounds  Abdominal:      General: Bowel sounds are normal  There is no distension  Palpations: Abdomen is soft  Tenderness: There is no abdominal tenderness  Musculoskeletal:         General: No swelling  Cervical back: Normal range of motion and neck supple  Right lower leg: Edema present  Left lower leg: Edema present  Skin:     General: Skin is warm and dry  Neurological:      General: No focal deficit present  Mental Status: She is alert  Additional Data:     Labs:  Results from last 7 days   Lab Units 03/18/22  0650 03/15/22  0503 03/15/22  0503   WBC Thousand/uL 6 99   < > 7 46   HEMOGLOBIN g/dL 8 8*   < > 8 2*   HEMATOCRIT % 30 7*   < > 28 8*   PLATELETS Thousands/uL 228   < > 214   NEUTROS PCT %  --   --  60   LYMPHS PCT %  --   --  18   MONOS PCT %  --   --  13*   EOS PCT %  --   --  7*    < > = values in this interval not displayed       Results from last 7 days   Lab Units 03/21/22  0612 03/16/22  0616 03/15/22  0503   SODIUM mmol/L 143   < > 144   POTASSIUM mmol/L 4 2   < > 4 8   CHLORIDE mmol/L 95*   < > 104   CO2 mmol/L 43*   < > 38*   BUN mg/dL 57*   < > 40*   CREATININE mg/dL 2 16*   < > 2 16*   ANION GAP mmol/L 5   < > 2*   CALCIUM mg/dL 7 9*   < > 7 6*   ALBUMIN g/dL  --   --  2 9*   TOTAL BILIRUBIN mg/dL  --   --  0 23   ALK PHOS U/L  --   --  85   ALT U/L  --   --  14   AST U/L  --   --  12   GLUCOSE RANDOM mg/dL 211*   < > 194*    < > = values in this interval not displayed           Results from last 7 days   Lab Units 03/21/22  1601 03/21/22  1128 03/21/22  0706 03/20/22 2009 03/20/22  1555 03/20/22  1141 03/20/22  1134 03/20/22  0756 03/19/22  2113 03/19/22  1617 03/19/22  1116 03/19/22  0706   POC GLUCOSE mg/dl 296* 253* 204* 362* 276* >500* 500* 228* 380* 294* 234* 188*               Lines/Drains:  Invasive Devices  Report    Peripheral Intravenous Line            Peripheral IV 03/17/22 Right Forearm 4 days          Drain            External Urinary Catheter 8 days                      Imaging: Reviewed radiology reports from this admission including: chest xray    Recent Cultures (last 7 days):   Results from last 7 days   Lab Units 03/18/22  1610   GRAM STAIN RESULT  1+ Polys  No bacteria seen   BODY FLUID CULTURE, STERILE  No growth       Last 24 Hours Medication List:   Current Facility-Administered Medications   Medication Dose Route Frequency Provider Last Rate    aluminum-magnesium hydroxide-simethicone  30 mL Oral Q6H PRN Cleven Jeans, MD      amLODIPine  5 mg Oral Daily Cleven Jeans, MD      ammonium lactate   Topical BID Cleven Jeans, MD      apixaban  5 mg Oral BID Cleven Jeans, MD      docusate sodium  100 mg Oral BID Rubens Poole MD      insulin glargine  36 Units Subcutaneous HS Rubens Poole MD      insulin lispro  1-5 Units Subcutaneous TID AC Cleven Jeans, MD      insulin lispro  1-5 Units Subcutaneous HS Cleven Jeans, MD      lidocaine  2 patch Topical Daily Cleven Jeans, MD      loratadine  10 mg Oral Daily Cleven Jeans, MD      magnesium hydroxide  30 mL Oral Daily PRN Cleven Jeans, MD      metoprolol  5 mg Intravenous Q6H PRN Cleven Jeans, MD      metoprolol tartrate  50 mg Oral Q12H [facility] Meryle Laws, MD      montelukast  10 mg Oral HS Meryle Laws, MD      nitroglycerin  0 4 mg Sublingual Q5 Min PRN Meryle Laws, MD      nystatin   Topical BID Meryle Laws, MD      ondansetron  4 mg Intravenous Q6H PRN Meryle Laws, MD      pravastatin  80 mg Oral Daily With Babatunde White MD      pregabalin  100 mg Oral BID Meryle Laws, MD      traMADol  50 mg Oral Q6H PRN Reed Rm PA-C          Today, Patient Was Seen By: Davy Carrillo MD    **Please Note: This note may have been constructed using a voice recognition system  **

## 2022-03-21 NOTE — ASSESSMENT & PLAN NOTE
Lab Results   Component Value Date    HGBA1C 6 8 (H) 03/09/2022     Recent Labs     03/20/22 2009 03/21/22  0706 03/21/22  1128 03/21/22  1601   POCGLU 362* 204* 253* 296*     Blood Sugar Average: Last 72 hrs:  (P) 279 4   · Blood sugars continued remained uncontrolled    Will increase Lantus to 36 units daily  · A1c indicated good control   · Continue Humalog sliding scale

## 2022-03-21 NOTE — PROGRESS NOTES
Pastoral Care Progress Note    3/21/2022  Patient: Angeli Cornejo : 1950  Admission Date & Time: 3/9/2022 1626  MRN: [id number] CSN: [id number]                     Chaplaincy Interventions Utilized:   Relationship Building: Cultivated a relationship of care and support  Patient said that she is waiting on a different diuretic treatment to remove fluid  The previous treatment didn't work  Patient has a very strong nate and is confident this new treatment will work and that she will be able to be discharged soon  She will go live with her niece and give up her own apartment  She feels very supported by her niece and extended family     Ritual: Provided prayer     22 1100   Clinical Encounter Type   Visited With Patient   Routine Visit Introduction   Referral To   (census/rounds)   Lutheran Encounters   Lutheran Needs Prayer

## 2022-03-21 NOTE — ASSESSMENT & PLAN NOTE
· Creatinine continues to get worse and is up to 2 6  · Baseline Cr 1 4-1 6 with multiple episodes of SEBATSIAN  · Likely secondary to CRS in setting of CHF exacerbation complicated with contrast induced nephropathy  · Had CT contrast 3/9  · Nephrology consulted:  · Renal ultrasound showed no hydronephrosis  · Avoid nephrotoxins and hypotension  · Monitor BMP  · No urgent indication for dialysis  · Patient was on Lasix 60 milligram IV q 12 hours which was changed to torsemide 20 milligram p o  Daily   · Might need accept higher creatinine to maintain euvolemic state  · Patient was started on Lasix drip at 10 milligram/hour with stable creatinine   · Creatinine slightly increased from 1 6-2 1 today  Lasix drip was stopped this morning  · Possible transition to p o   Diuretics over the next 24 hours based on creatinine

## 2022-03-21 NOTE — PROGRESS NOTES
NEPHROLOGY PROGRESS NOTE   Keesha Blanco 73 y o  female MRN: [id number]  Unit/Bed#: [address] [place] [address] Encounter: [id number]  Reason for Consult: SEBASTIAN      SUMMARY:    73 y o  woman PMH of CKD G3b (creatinine were 1 4-1 6mg/dL) last time seen by Akosua Naidu in 2019, with multiple episodes of SEBASTIAN, DM, obesity, HTN, CHF, recent fall s/p left shoulder fracture, AFib on Eliquis p/w chest pain, elevated creatinine and fluid overload   Nephrology is consulted for SEBASTIAN in diuretic management    ASSESSMENT and PLAN:    Acute kidney injury  --admission creatinine 1 03 mg/dL, but presented volume overloaded and with CHF    Peak creatinine 2 44 mg/dL  --acute kidney injury in the setting of cardiorenal syndrome from congestive heart failure complicated by contrast associated nephropathy  --baseline creatinine in the mid 1s  --patient had been diuresing on Lasix drip, with net negative fluid balance being achieved, and maintain her creatinine at the high end of a baseline over the weekend  --creatinine increased to 2 1 mg/dL  --urinalysis was bland  --ultrasound showed no hydronephrosis  --agree with stopping the Lasix drip today  --will monitor for oral diuretic needs tomorrow  --Daily BMP  --avoid contrast studies    Chronic kidney disease stage IIIB  --baseline creatinine mid 1's, may need to tolerate a slightly higher creatinine to keep out of volume overload and congestive heart failure  --presumed secondary to diabetic kidney disease hypertensive nephrosclerosis    Blood pressure/volume status  --history of hypertension  --volume overloaded with evidence of pulmonary edema and pleural effusion with weight gain  --patient diuresed with Lasix drip, which was discontinued this morning  --patient net -13 L since admission  --her weight has improved to 263 lb, which is essentially back to her dry weight  --hold off on oral diuretics today  --blood pressure control  --avoid angiotensin receptor blocker and ARB at this    Acute on chronic diastolic heart failure  --cardiology on board  --discontinue Lasix drip this  --currently on metoprolol and amlodipine  --back to her dry weight  --volume status appears to have improved    Status post fall  --resulting in a Comminuted displaced fracture of the proximal humerus   --status post left shoulder reverse complete arthroplasty on March 1st        SUBJECTIVE / INTERVAL HISTORY:    No chest pain or shortness of breath  Swelling has improved    OBJECTIVE:  Current Weight: Weight - Scale: 119 kg (263 lb 5 oz)  Vitals:    03/21/22 0549 03/21/22 0553 03/21/22 0802 03/21/22 0803   BP:   139/66 139/66   BP Location:    Right arm   Pulse:   67 72   Resp:   17 17   Temp:   97 7 °F (36 5 °C) 97 7 °F (36 5 °C)   TempSrc:    Oral   SpO2:   96% 98%   Weight: 119 kg (263 lb) 119 kg (263 lb 5 oz)     Height:           Intake/Output Summary (Last 24 hours) at 3/21/2022 1024  Last data filed at 3/21/2022 9974  Gross per 24 hour   Intake 470 ml   Output 2100 ml   Net -1630 ml       Review of Systems:    12 point ROS has been reviewed  Physical Exam  Vitals and nursing note reviewed  Constitutional:       General: She is not in acute distress  Appearance: She is well-developed  She is obese  HENT:      Head: Normocephalic and atraumatic  Mouth/Throat:      Mouth: Mucous membranes are dry  Eyes:      General: No scleral icterus  Conjunctiva/sclera: Conjunctivae normal       Pupils: Pupils are equal, round, and reactive to light  Cardiovascular:      Rate and Rhythm: Normal rate and regular rhythm  Heart sounds: S1 normal and S2 normal  No murmur heard  No friction rub  No gallop  Pulmonary:      Effort: Pulmonary effort is normal  No respiratory distress  Breath sounds: Normal breath sounds  No wheezing or rales  Abdominal:      General: Bowel sounds are normal       Palpations: Abdomen is soft  Tenderness: There is no abdominal tenderness  There is no rebound     Musculoskeletal: General: Normal range of motion  Cervical back: Normal range of motion and neck supple  Skin:     General: Skin is dry  Coloration: Skin is pale  Findings: No rash  Neurological:      Mental Status: She is alert and oriented to person, place, and time     Psychiatric:         Behavior: Behavior normal          Medications:    Current Facility-Administered Medications:     aluminum-magnesium hydroxide-simethicone (MYLANTA) oral suspension 30 mL, 30 mL, Oral, Q6H PRN, Duncan Brian MD    amLODIPine (NORVASC) tablet 5 mg, 5 mg, Oral, Daily, Duncan Brian MD, 5 mg at 03/21/22 0954    ammonium lactate (LAC-HYDRIN) 12 % lotion, , Topical, BID, Duncan Brian MD, Given at 03/21/22 1002    apixaban (ELIQUIS) tablet 5 mg, 5 mg, Oral, BID, Duncan Brian MD, 5 mg at 03/21/22 0954    docusate sodium (COLACE) capsule 100 mg, 100 mg, Oral, BID, Kya Pickering MD, 100 mg at 03/21/22 0953    insulin glargine (LANTUS) subcutaneous injection 36 Units 0 36 mL, 36 Units, Subcutaneous, HS, Georgi Dye MD    insulin lispro (HumaLOG) 100 units/mL subcutaneous injection 1-5 Units, 1-5 Units, Subcutaneous, TID AC, 1 Units at 03/21/22 0801 **AND** Fingerstick Glucose (POCT), , , TID AC, Duncan Brian MD    insulin lispro (HumaLOG) 100 units/mL subcutaneous injection 1-5 Units, 1-5 Units, Subcutaneous, HS, Duncan Brian MD, 3 Units at 03/20/22 2210    lidocaine (LIDODERM) 5 % patch 2 patch, 2 patch, Topical, Daily, Duncan Brian MD, 2 patch at 03/21/22 0957    loratadine (CLARITIN) tablet 10 mg, 10 mg, Oral, Daily, Duncan Brian MD, 10 mg at 03/21/22 0954    magnesium hydroxide (MILK OF MAGNESIA) oral suspension 30 mL, 30 mL, Oral, Daily PRN, Duncan Brian MD    metoprolol (LOPRESSOR) injection 5 mg, 5 mg, Intravenous, Q6H PRN, Duncan Brian MD    metoprolol tartrate (LOPRESSOR) tablet 50 mg, 50 mg, Oral, Q12H [facility], Duncan Brian MD, 50 mg at 03/21/22 0955    montelukast (SINGULAIR) tablet 10 mg, 10 mg, Oral, HS, Michelle Anne MD, 10 mg at 03/20/22 2209    nitroglycerin (NITROSTAT) SL tablet 0 4 mg, 0 4 mg, Sublingual, Q5 Min PRN, Michelle Anne MD    nystatin (MYCOSTATIN) powder, , Topical, BID, Michelle Anne MD, Given at 03/21/22 0957    [facility]ansetron [facility]) injection 4 mg, 4 mg, Intravenous, Q6H PRN, Michelle Anne MD, 4 mg at 03/20/22 1624    pravastatin (PRAVACHOL) tablet 80 mg, 80 mg, Oral, Daily With Mejia Aguillon MD, 80 mg at 03/20/22 1618    pregabalin (LYRICA) capsule 100 mg, 100 mg, Oral, BID, Michelle Anne MD, 100 mg at 03/21/22 0954    traMADol (ULTRAM) tablet 50 mg, 50 mg, Oral, Q6H PRN, Mikey Rm PA-C, 50 mg at 03/20/22 1624    Laboratory Results:  Results from last 7 days   Lab Units 03/21/22  0612 03/20/22  0609 03/19/22  0722 03/18/22  0650 03/17/22  0522 03/16/22  0616 03/15/22  0503   WBC Thousand/uL  --   --   --  6 99  --   --  7 46   HEMOGLOBIN g/dL  --   --   --  8 8*  --   --  8 2*   HEMATOCRIT %  --   --   --  30 7*  --   --  28 8*   PLATELETS Thousands/uL  --   --   --  228  --   --  214   POTASSIUM mmol/L 4 2 4 1 4 5 5 1 5 2 5 3 4 8   CHLORIDE mmol/L 95* 94* 98* 101 102 102 104   CO2 mmol/L 43* 44* 44* 41* 35* 37* 38*   BUN mg/dL 57* 52* 51* 49* 53* 53* 40*   CREATININE mg/dL 2 16* 1 66* 1 65* 1 60* 1 83* 2 44* 2 16*   CALCIUM mg/dL 7 9* 8 2* 8 0* 7 9* 8 0* 7 6* 7 6*       PLEASE NOTE:  This encounter was completed utilizing the M- Mod[employer]Fluency Direct Speech Voice Recognition Software  Grammatical errors, random word insertions, pronoun errors and incomplete sentences are occasional consequences of the system due to software limitations, ambient noise and hardware issues  These may be missed by proof reading prior to affixing electronic signature  Any questions or concerns about the content, text or information contained within the body of this dictation should be directly addressed to the physician for clarification   Please do not hesitate to call me directly if you have any any questions or concerns

## 2022-03-22 ENCOUNTER — PATIENT OUTREACH (OUTPATIENT)
Dept: CASE MANAGEMENT | Facility: OTHER | Age: 72
End: 2022-03-22

## 2022-03-22 VITALS
RESPIRATION RATE: 18 BRPM | DIASTOLIC BLOOD PRESSURE: 50 MMHG | OXYGEN SATURATION: 100 % | HEIGHT: 62 IN | BODY MASS INDEX: 48.36 KG/M2 | SYSTOLIC BLOOD PRESSURE: 123 MMHG | HEART RATE: 62 BPM | TEMPERATURE: 97.3 F | WEIGHT: 262.79 LBS

## 2022-03-22 PROBLEM — J96.01 ACUTE RESPIRATORY FAILURE WITH HYPOXIA (HCC): Status: ACTIVE | Noted: 2022-03-22

## 2022-03-22 LAB
ANION GAP SERPL CALCULATED.3IONS-SCNC: 5 MMOL/L (ref 4–13)
BUN SERPL-MCNC: 63 MG/DL (ref 5–25)
CALCIUM SERPL-MCNC: 7.6 MG/DL (ref 8.3–10.1)
CHLORIDE SERPL-SCNC: 94 MMOL/L (ref 100–108)
CO2 SERPL-SCNC: 42 MMOL/L (ref 21–32)
CREAT SERPL-MCNC: 2.1 MG/DL (ref 0.6–1.3)
GFR SERPL CREATININE-BSD FRML MDRD: 23 ML/MIN/1.73SQ M
GLUCOSE SERPL-MCNC: 139 MG/DL (ref 65–140)
GLUCOSE SERPL-MCNC: 142 MG/DL (ref 65–140)
GLUCOSE SERPL-MCNC: 303 MG/DL (ref 65–140)
POTASSIUM SERPL-SCNC: 3.9 MMOL/L (ref 3.5–5.3)
SODIUM SERPL-SCNC: 141 MMOL/L (ref 136–145)

## 2022-03-22 PROCEDURE — 99239 HOSP IP/OBS DSCHRG MGMT >30: CPT | Performed by: PHYSICIAN ASSISTANT

## 2022-03-22 PROCEDURE — 82948 REAGENT STRIP/BLOOD GLUCOSE: CPT

## 2022-03-22 PROCEDURE — 99232 SBSQ HOSP IP/OBS MODERATE 35: CPT | Performed by: INTERNAL MEDICINE

## 2022-03-22 PROCEDURE — 80048 BASIC METABOLIC PNL TOTAL CA: CPT | Performed by: INTERNAL MEDICINE

## 2022-03-22 PROCEDURE — 97116 GAIT TRAINING THERAPY: CPT

## 2022-03-22 PROCEDURE — 97530 THERAPEUTIC ACTIVITIES: CPT

## 2022-03-22 RX ORDER — TORSEMIDE 10 MG/1
10 TABLET ORAL DAILY
Qty: 30 TABLET | Refills: 0 | Status: ON HOLD | OUTPATIENT
Start: 2022-03-23 | End: 2022-05-23

## 2022-03-22 RX ADMIN — AMLODIPINE BESYLATE 5 MG: 5 TABLET ORAL at 08:50

## 2022-03-22 RX ADMIN — Medication: at 08:49

## 2022-03-22 RX ADMIN — LORATADINE 10 MG: 10 TABLET ORAL at 08:51

## 2022-03-22 RX ADMIN — APIXABAN 5 MG: 5 TABLET, FILM COATED ORAL at 08:50

## 2022-03-22 RX ADMIN — METOPROLOL TARTRATE 50 MG: 50 TABLET, FILM COATED ORAL at 08:50

## 2022-03-22 RX ADMIN — DOCUSATE SODIUM 100 MG: 100 CAPSULE ORAL at 08:51

## 2022-03-22 RX ADMIN — NYSTATIN: 100000 POWDER TOPICAL at 08:49

## 2022-03-22 RX ADMIN — INSULIN LISPRO 3 UNITS: 100 INJECTION, SOLUTION INTRAVENOUS; SUBCUTANEOUS at 11:56

## 2022-03-22 RX ADMIN — LIDOCAINE 5% 2 PATCH: 700 PATCH TOPICAL at 08:49

## 2022-03-22 RX ADMIN — PREGABALIN 100 MG: 100 CAPSULE ORAL at 08:50

## 2022-03-22 NOTE — ASSESSMENT & PLAN NOTE
· On arrival rapid a fib with RVR, responded to IV lopressor  · Continue metoprolol tartrate 50 mg PO BID at home   · DEMETRIS Vasc score 3, Dr Garcia Clinrima cleared to resume Eliquis 5 mg p o  B i d

## 2022-03-22 NOTE — PROGRESS NOTES
[place] 47  Progress Note Chris Dupont 1950, 73 y o  female MRN: [id number]  Unit/Bed#: [address] [place] [address] Encounter: [id number]  Primary Care Provider: Joana Macdonald   Date and time admitted to hospital: 3/9/2022  4:26 PM    * Acute diastolic congestive heart failure [facility])  Assessment & Plan  Wt Readings from Last 3 Encounters:   03/22/22 119 kg (262 lb 12 6 oz)   03/08/22 135 kg (297 lb 9 9 oz)   03/03/22 127 kg (279 lb 15 8 oz)     · SOB POA, secondary to acute on chronic diastolic CHF  BNP 5992  CT shows small b/l plerual effusions   · Recent shoulder surgery, SEBASTIAN, not on diuretics at SNF (previously on torsemide 10mg/d)  · ECHO performed 3/10 shows EF 47%, systolic function low normal, borderline concentric hypertrophy   · Patient was treated with IV Lasix, transitioned to p o  torsemide however c/o SOB and repeat CXR showed persistent pulmonary congestion  She was transitioned back on IV Lasix and restarted on p o  Torsemide 20 mg on 03/17  Sx worsened again and patient was started on Lasix drip on 3/18    · Status post thoracentesis with removal of 500 cc on the right and 300 cc on the left, pleural fluid analysis suggests transudative effusions  · Most recent CXR shows improved pulmonary vascular congestion   · Continue beta-blocker, I/O, daily weights, and encouraged compliance with CHF diet  · Follow-up recommendations from cards/Nephrology regarding transition to p o  diuretics     Acute respiratory failure with hypoxia (HCC)  Assessment & Plan  · Likely 2/2 decompensated CHF   · Procalcitonin negative - would hold off abx for aspiration related air space opacities seen on CT    · CTA chest negative for pulmonary embolism   · Home o2 eval per RRT appreciated   · Pt arranged for home O2   · Continue 2L NC     SEBASTIAN (acute kidney injury) ([facility] [address] )  Assessment & Plan  · Creatinine peaked 2 6  · Baseline Cr 1 4-1 6 with multiple episodes of SEBASTIAN  · Likely secondary to CRS in setting of CHF exacerbation complicated with contrast induced nephropathy  · Had CT contrast 3/9  · Nephrology consulted  · Might need accept higher creatinine to maintain euvolemic state  · Patient was started on Lasix drip, cr today 2 1 after drip was d/c 3/21   · Possible transition to p o  Diuretics over the next 24 hours based on creatinine    Diabetes mellitus, type 2 [facility])  Assessment & Plan  Lab Results   Component Value Date    HGBA1C 6 8 (H) 03/09/2022     Recent Labs     03/21/22  1128 03/21/22  1601 03/21/22  2034 03/22/22  0717   POCGLU 253* 296* 361* 139     Blood Sugar Average: Last 72 hrs:  (P) 285 [id number]   · Blood sugars continued remained uncontrolled  HA1C indicated good control  · Continue Lantus 36 units daily  · Continue Humalog sliding scale    Status post reverse total replacement of left shoulder  Assessment & Plan  · S/p fall resulting in Comminuted displaced fracture of the proximal humerus (2/23/22)  · S/p left shoulder reverse complete arthroplasty (3/1/22)   · LUE venous duplex is negative for DVT  · PT/OT consults appreciated; recommending home with therapy  · OP ortho follow up     Paroxysmal atrial fibrillation ([address] )  Assessment & Plan  · On arrival rapid a fib with RVR, responded to IV lopressor  · Continue metoprolol tartrate 50 mg PO BID at home   · DEMETRIS Vasc score 3, Dr Michael Gerber cleared to resume Eliquis 5 mg p o  B i d     Essential hypertension  Assessment & Plan  · On lopressor PO; increased to 50 mg p o  Q 12 hours during hospitalization    · Continue Norvasc    Anemia  Assessment & Plan  · Hgb stable, likely worsened secondary to post op loss   · Hemoglobin has been stable in the range of 8    Mixed hyperlipidemia  Assessment & Plan  · On rosuvastatin    Morbid obesity with BMI of 45 0-49 9, adult (HCC)  Assessment & Plan  · Counseled weight loss   · Sleep apnea, refusing CPAP     VTE Pharmacologic Prophylaxis: VTE Score: 17 High Risk (Score >/= 5) - Pharmacological DVT Prophylaxis Ordered: apixaban (Eliquis)  Sequential Compression Devices Ordered  Patient Centered Rounds: I performed bedside rounds with nursing staff today  Discussions with Specialists or Other Care Team Provider:     Education and Discussions with Family / Patient: Patient declined call to   will update pending d c plan after disucssion with specialists  Time Spent for Care: 30 minutes  More than 50% of total time spent on counseling and coordination of care as described above  Current Length of Stay: 13 day(s)  Current Patient Status: Inpatient   Certification Statement: The patient will continue to require additional inpatient hospital stay due to pending improvement in renal function, transition to PO diuretics   Discharge Plan: Anticipate discharge later today or tomorrow to home with home services  Code Status: Level 1 - Full Code    Subjective:   Pt seen and examined at bedside  Feels improved  No cp or sob  Reports good urine output  Objective:     Vitals:   Temp (24hrs), Av 6 °F (36 4 °C), Min:97 3 °F (36 3 °C), Max:97 7 °F (36 5 °C)    Temp:  [97 3 °F (36 3 °C)-97 7 °F (36 5 °C)] 97 3 °F (36 3 °C)  HR:  [62-66] 62  Resp:  [18] 18  BP: (122-134)/(49-66) 123/50  SpO2:  [95 %-100 %] 100 %  Body mass index is 48 06 kg/m²  Input and Output Summary (last 24 hours): Intake/Output Summary (Last 24 hours) at 3/22/2022 0928  Last data filed at 3/22/2022 0601  Gross per 24 hour   Intake 520 ml   Output 1200 ml   Net -680 ml       Physical Exam:   Physical Exam  Constitutional:       Appearance: Normal appearance  She is obese  She is not ill-appearing  HENT:      Head: Normocephalic and atraumatic  Mouth/Throat:      Mouth: Mucous membranes are moist    Eyes:      Extraocular Movements: Extraocular movements intact  Cardiovascular:      Rate and Rhythm: Normal rate and regular rhythm     Pulmonary:      Effort: Pulmonary effort is normal       Breath sounds: Normal breath sounds  Comments: On 1 5 L NC  No w/r/r   Abdominal:      General: Bowel sounds are normal       Palpations: Abdomen is soft  Musculoskeletal:         General: No swelling  Normal range of motion  Cervical back: Normal range of motion and neck supple  Comments: LUE in sling  Minimal edema  Incision healing well with no erythema or drainage  Skin:     General: Skin is warm and dry  Neurological:      General: No focal deficit present  Mental Status: She is alert     Psychiatric:         Mood and Affect: Mood normal        Additional Data:     Labs:  Results from last 7 days   Lab Units 03/18/22  0650   WBC Thousand/uL 6 99   HEMOGLOBIN g/dL 8 8*   HEMATOCRIT % 30 7*   PLATELETS Thousands/uL 228     Results from last 7 days   Lab Units 03/22/22  0607   SODIUM mmol/L 141   POTASSIUM mmol/L 3 9   CHLORIDE mmol/L 94*   CO2 mmol/L 42*   BUN mg/dL 63*   CREATININE mg/dL 2 10*   ANION GAP mmol/L 5   CALCIUM mg/dL 7 6*   GLUCOSE RANDOM mg/dL 142*         Results from last 7 days   Lab Units 03/22/22  0717 03/21/22  2034 03/21/22  1601 03/21/22  1128 03/21/22  0706 03/20/22  2009 03/20/22  1555 03/20/22  1141 03/20/22  1134 03/20/22  0756 03/19/22  2113 03/19/22  1617   POC GLUCOSE mg/dl 139 361* 296* 253* 204* 362* 276* >500* 500* 228* 380* 294*               Lines/Drains:  Invasive Devices  Report    Peripheral Intravenous Line            Peripheral IV 03/17/22 Right Forearm 4 days          Drain            External Urinary Catheter 8 days                      Imaging: Reviewed radiology reports from this admission including: xray(s)    Recent Cultures (last 7 days):   Results from last 7 days   Lab Units 03/18/22  1610   GRAM STAIN RESULT  1+ Polys  No bacteria seen   BODY FLUID CULTURE, STERILE  No growth       Last 24 Hours Medication List:   Current Facility-Administered Medications   Medication Dose Route Frequency Provider Last Rate    aluminum-magnesium hydroxide-simethicone 30 mL Oral Q6H PRN Patti White MD      amLODIPine  5 mg Oral Daily Patti White MD      ammonium lactate   Topical BID Patti White MD      apixaban  5 mg Oral BID Patti White MD      docusate sodium  100 mg Oral BID Bowen Salvador MD      insulin glargine  36 Units Subcutaneous HS Bowen Salvador MD      insulin lispro  1-5 Units Subcutaneous TID AC Patti White MD      insulin lispro  1-5 Units Subcutaneous HS Patti White MD      lidocaine  2 patch Topical Daily Patti White MD      loratadine  10 mg Oral Daily Patti White MD      magnesium hydroxide  30 mL Oral Daily PRN Patti White MD      metoprolol  5 mg Intravenous Q6H PRN Patti White MD      metoprolol tartrate  50 mg Oral Q12H Doris Guevara MD      montelukast  10 mg Oral HS Patti White MD      nitroglycerin  0 4 mg Sublingual Q5 Min PRN Patti White MD      nystatin   Topical BID Patti White MD      ondansetron  4 mg Intravenous Q6H PRN Patti White MD      pravastatin  80 mg Oral Daily With Flory Plasencia MD      pregabalin  100 mg Oral BID Patti White MD      traMADol  50 mg Oral Q6H PRN Dina Rm PA-C          Today, Patient Was Seen By: Madalyn Philippe PA-C    **Please Note: This note may have been constructed using a voice recognition system  **

## 2022-03-22 NOTE — ASSESSMENT & PLAN NOTE
Lab Results   Component Value Date    HGBA1C 6 8 (H) 03/09/2022     Recent Labs     03/21/22  1128 03/21/22  1601 03/21/22 2034 03/22/22  0717   POCGLU 253* 296* 361* 139     Blood Sugar Average: Last 72 hrs:  (P) 285 [id number]   · Blood sugars continued remained uncontrolled  HA1C indicated good control     · Continue Lantus 36 units daily  · Continue Humalog sliding scale

## 2022-03-22 NOTE — PROGRESS NOTES
Progress Note - Cardiology   [facility]     Bill Gómez 73 y o  female MRN: [id number]  : 1950  Unit/Bed#: [address] [place] [address] Encounter: [id number]    ASSESSMENT:  Acute on chronic diastolic heart failure  TTE, 2022:   EF 53%, borderline LVH  Weight down to 119 lb from 133 lb  Lasix infusion transition to low-dose Demadex from tomorrow per Nephrology     Hypertension     PAF     SEBASTIAN on CKD     Superficial thrombophlebitis of left cephalic vein     Bilateral pleural effusions, s/p bilateral thoracentesis     S/p left shoulder replacement     Anemia              RECOMMENDATIONS:  Continue cardiac medications including Lopressor, amlodipine, Eliquis, pravastatin  Lasix infusion transition to low-dose Demadex from tomorrow     Daily weight, I/O  Low-sodium diet     Stable from cardiac standpoint               Subjective / Objective:     Review of Systems   Denies chest pain or any increased dyspnea  Denies any new or acute symptoms  Up in recliner stocking to physical therapist  Vitals: Blood pressure 123/50, pulse 62, temperature (!) 97 3 °F (36 3 °C), temperature source Oral, resp  rate 18, height 5' 2" (1 575 m), weight 119 kg (262 lb 12 6 oz), SpO2 100 %, not currently breastfeeding  Vitals:    22 0553 22 0600   Weight: 119 kg (263 lb 5 oz) 119 kg (262 lb 12 6 oz)     Body mass index is 48 06 kg/m²  BP Readings from Last 3 Encounters:   22 123/50   22 144/66   22 164/69     Orthostatic Blood Pressures      Most Recent Value   Blood Pressure 123/50 filed at 2022 0801   Patient Position - Orthostatic VS Lying filed at 2022 0803        I/O        07 0700  0701   0700  0701   0700    P  O  930 510     I V  (mL/kg) 10 (0 1) 10 (0 1)     Total Intake(mL/kg) 940 (7 9) 520 (4 4)     Urine (mL/kg/hr) 2250 (0 8) 1200 (0 4)     Stool       Total Output 2250 1200     Net -1310 -680                Invasive Devices Report    Peripheral Intravenous Line            Peripheral IV 03/17/22 Right Forearm 4 days          Drain            External Urinary Catheter 8 days                  Intake/Output Summary (Last 24 hours) at 3/22/2022 1120  Last data filed at 3/22/2022 0601  Gross per 24 hour   Intake 290 ml   Output 1000 ml   Net -710 ml         Physical Exam:   Physical Exam    Neurologic:  Alert & oriented x 3,  no focal deficits noted   Constitutional:  Morbid obesity  Eyes:  PERRL, conjunctiva normal   HENT:  Atraumatic, external ears normal, nose normal,   NECK: Normal range of motion, no tenderness, neck is supple , No JVP  Respiratory:  Bilateral air entry, mostly clear to auscultation  Cardiovascular: Regular S1-S2 with a I/VI ejection systolic murmur  No pericardial rub or gallop audible  GI:  Soft, nondistended, audible bowel sounds, nontender, no hepatosplenomegaly appreciated  Musculoskeletal:  No tenderness, no deformities  Extremities:  Mild bilateral edema   Distal pulses are present  Psychiatric:  Speech and behavior appropriate             Medications/ Allergies:     Current Facility-Administered Medications   Medication Dose Route Frequency Provider Last Rate    aluminum-magnesium hydroxide-simethicone  30 mL Oral Q6H PRN Meryle Laws, MD      amLODIPine  5 mg Oral Daily Meryle Laws, MD      ammonium lactate   Topical BID Meryle Laws, MD      apixaban  5 mg Oral BID Meryle Laws, MD      docusate sodium  100 mg Oral BID Davy Carrillo MD      insulin glargine  36 Units Subcutaneous HS Davy Carrillo MD      insulin lispro  1-5 Units Subcutaneous TID AC Meryle Laws, MD      insulin lispro  1-5 Units Subcutaneous HS Meryle Laws, MD      lidocaine  2 patch Topical Daily Meryle Laws, MD      loratadine  10 mg Oral Daily Meryle Laws, MD      magnesium hydroxide  30 mL Oral Daily PRN Meryle Laws, MD      metoprolol  5 mg Intravenous Q6H PRN Meryle Laws, MD      metoprolol tartrate  50 mg Oral Q12H Lind Day, MD      montelukast  10 mg Oral HS Kevin Noble MD      nitroglycerin  0 4 mg Sublingual Q5 Min PRN Kevin Noble MD      nystatin   Topical BID Kevin Noble MD      ondansetron  4 mg Intravenous Q6H PRN Kevin Noble, MD      pravastatin  80 mg Oral Daily With Randee Massey MD      pregabalin  100 mg Oral BID Kevin Noble, MD      traMADol  50 mg Oral Q6H PRN Mikey Rm PA-C       aluminum-magnesium hydroxide-simethicone, 30 mL, Q6H PRN  magnesium hydroxide, 30 mL, Daily PRN  metoprolol, 5 mg, Q6H PRN  nitroglycerin, 0 4 mg, Q5 Min PRN  ondansetron, 4 mg, Q6H PRN  traMADol, 50 mg, Q6H PRN      Allergies   Allergen Reactions    Penicillins Hives    Moxifloxacin Other (See Comments)     unknown    Percocet [Oxycodone-Acetaminophen] Other (See Comments)     unknown    Zinc Acetate Other (See Comments)     unknown    Nuts - Food Allergy Other (See Comments)    Asa [Aspirin] GI Intolerance    Indocin [Indomethacin] Other (See Comments)     Made patient "loopy"    Other Other (See Comments)     unknown           Labs:   Troponins:      CBC with diff:  Results from last 7 days   Lab Units 03/18/22  0650   WBC Thousand/uL 6 99   HEMOGLOBIN g/dL 8 8*   HEMATOCRIT % 30 7*   MCV fL 99*   PLATELETS Thousands/uL 228   MCH pg 28 4   MCHC g/dL 28 7*   RDW % 15 9*   MPV fL 10 8       CMP:  Results from last 7 days   Lab Units 03/22/22  0607 03/21/22  0612 03/20/22  0609 03/19/22  0722 03/18/22  0650 03/17/22  0522 03/16/22  0616   SODIUM mmol/L 141 143 141 145 146* 144 145   POTASSIUM mmol/L 3 9 4 2 4 1 4 5 5 1 5 2 5 3   CHLORIDE mmol/L 94* 95* 94* 98* 101 102 102   CO2 mmol/L 42* 43* 44* 44* 41* 35* 37*   ANION GAP mmol/L 5 5 3* 3* 4 7 6   BUN mg/dL 63* 57* 52* 51* 49* 53* 53*   CREATININE mg/dL 2 10* 2 16* 1 66* 1 65* 1 60* 1 83* 2 44*   CALCIUM mg/dL 7 6* 7 9* 8 2* 8 0* 7 9* 8 0* 7 6*   TOTAL PROTEIN g/dL  --   --   --   --   --   --  6 0*   EGFR ml/min/1 73sq m 23 22 31 31 32 27 19       Magnesium:    Coags:    TSH:    No components found for: TSH3  Lipid Profile:    Hgb A1c:    NT-proBNP: No results for input(s): NTBNP in the last 72 hours  Imaging & Testing   I have personally reviewed pertinent reports  XR chest portable    Result Date: 3/21/2022  Narrative: CHEST INDICATION:   Follow-up CHF  COMPARISON:  March 17, 2022 EXAM PERFORMED/VIEWS:  XR CHEST PORTABLE FINDINGS: Cardiomediastinal silhouette appears unremarkable  Mild pulmonary vascular congestive changes, slightly improved from March 17, 2022  Trace right costophrenic angle effusion  No pneumothorax  Left shoulder arthroplasty  Impression: Improvement in mild pulmonary vascular congestive change  Trace right costophrenic angle effusion  Workstation performed: [id number]     XR chest portable    Result Date: 3/17/2022  Narrative: CHEST INDICATION:   CHF  COMPARISON:  Chest radiograph from 3/13/2022 and 3/10/2022, chest CT from 3/9/2022  EXAM PERFORMED/VIEWS:  XR CHEST PORTABLE FINDINGS: Cardiomediastinal silhouette appears unremarkable  Persistent pulmonary edema with small effusions  No pneumothorax  Reverse left shoulder arthroplasty  Impression: Persistent mild pulmonary edema with small effusions  Workstation performed: [id number]     XR chest portable    Result Date: 3/13/2022  Narrative: CHEST INDICATION:   sob  COMPARISON:  Chest radiograph March 10, 2022  Correlation with chest CT March 9, 2022 EXAM PERFORMED/VIEWS:  XR CHEST PORTABLE FINDINGS: Cardiomediastinal silhouette appears unremarkable  Persistent mild prominence of the interstitial markings  Limited assessment for pleural effusions; no large pleural effusion  No pneumothorax  Partially imaged left shoulder reverse arthroplasty  Impression: Pulmonary vascular congestion is similar to prior study   Workstation performed: [id number]     XR chest portable    Result Date: 3/10/2022  Narrative: CHEST INDICATION:   sob  fu on status of fluid overload  COMPARISON:  3/9/2022  EXAM PERFORMED/VIEWS:  XR CHEST PORTABLE Images:  1 FINDINGS: Cardiac and mediastinal contours are stable and within normal limits  The aorta is calcified  There is increased pulmonary vascular congestion and development of small bilateral pleural effusions  No pneumothorax or focal airspace consolidation  Left shoulder reverse total arthroplasty partially seen  Impression: Increased pulmonary vascular congestion and development of small bilateral pleural effusions  Workstation performed: [id number]     XR chest 1 view portable    Result Date: 3/10/2022  Narrative: CHEST INDICATION:   shortness of breath  COMPARISON:  3/8/2022 ; 7/31/2019 EXAM PERFORMED/VIEWS:  XR CHEST PORTABLE FINDINGS:  Mild hypoventilation is present  Cardiomediastinal silhouette appears unremarkable  Subsegmental atelectasis at the bases is noted probably related to hypoventilation although some developing edema is difficult to exclude  No pneumothorax or pleural effusion  Left shoulder replacement is present  Impression: Hypoventilation with subsegmental atelectasis at the bases  Developing edema or inflammatory infiltrates are difficult to exclude  Please see subsequent CT for further report Workstation performed: [id number]     XR chest 1 view portable    Result Date: 3/9/2022  Narrative: CHEST INDICATION:   chest pain  COMPARISON:  7/31/2019 EXAM PERFORMED/VIEWS:  XR CHEST PORTABLE Single view FINDINGS: Cardiomediastinal silhouette appears unremarkable  The lungs are clear  No pneumothorax or pleural effusion  Reverse left shoulder arthroplasty has been performed since the prior study     Impression: No acute cardiopulmonary disease  Findings are stable except for left shoulder arthroplasty Workstation performed: [id number]     XR shoulder left 1 view    Result Date: 3/3/2022  Narrative: LEFT SHOULDER INDICATION:   post op   COMPARISON:  2/23/2022 VIEWS:  XR SHOULDER 1 VW LEFT FINDINGS: There is a reverse left shoulder arthroplasty  Components are in anatomic alignment  There are expected postoperative changes  Impression: Anatomic alignment of left shoulder reverse arthroplasty  Workstation performed: [id number]     XR shoulder 2+ vw left    Result Date: 2/25/2022  Narrative: LEFT SHOULDER INDICATION:   S42 292A: Other displaced fracture of upper end of left humerus, initial encounter for closed fracture  COMPARISON:  02/16/2022 VIEWS:  XR SHOULDER 2+ VW LEFT Images: 3 FINDINGS: Comminuted fracture of the proximal humerus  Humeral shaft is displaced from the humeral head which is located within the glenoid  Mild widening at the glenohumeral joint likely represents pseudosubluxation  No significant degenerative changes  No lytic or blastic osseous lesion  Soft tissues are unremarkable  Impression: Comminuted displaced fracture of the proximal humerus  Workstation performed: [id number]     CT chest wo contrast    Result Date: 3/17/2022  Narrative: CT CHEST WITHOUT IV CONTRAST INDICATION: "Bilateral infiltrates rule out pneumonia " Per my review of the medical record, the patient has acute on chronic kidney disease and CHF exacerbation  COMPARISON:  Chest radiograph from 3/17/2022 and chest CT from 3/9/2022  TECHNIQUE: Chest CT without intravenous contrast   Axial, sagittal, coronal 2D reformats and coronal MIPS from source data  Radiation dose length product (DLP):  941 09 mGy-cm   Radiation dose exposure minimized using iterative reconstruction and automated exposure control  FINDINGS: LUNGS:  Septal thickening with scattered alveolar nodularity due to mild alveolar and interstitial edema  Resolving nodular airspace opacity in the superior segment right lower lobe  Mild dependent atelectasis in both lower lobes  AIRWAYS: No significant filling defects  PLEURA:  Stable moderate effusions  HEART/GREAT VESSELS:  Normal heart size   Mild coronary artery calcification indicating atherosclerotic heart disease  MEDIASTINUM AND LUIGI:  Unremarkable  CHEST WALL AND LOWER NECK: Unremarkable  UPPER ABDOMEN:  Unremarkable  OSSEOUS STRUCTURES: Mild degenerative disease in the spine  Left shoulder arthroplasty  Impression: Nothing to suggest pneumonia  Septal thickening and alveolar nodules due to interstitial and alveolar edema with persistent moderate effusions  Workstation performed: [id number]     IR IN-Patient Thoracentesis    Result Date: 3/18/2022  Narrative: Ultrasound-guided thoracentesis Clinical History: 57-year-old female with history of bilateral pleural effusions    Technique: The patient was brought to the interventional radiology area and placed in the sitting position on the side of a stretcher  The bilateral chest was then examined with ultrasound and the skin was marked  The skin was then prepped, and draped in usual sterile fashion  Lidocaine was administered to the skin and a small skin incision was made at each site  Under direct ultrasound guidance, a 5 [place] Yueh multisidehole catheter was advanced into each pleural space  500 mL of red pleural fluid was drained from the right chest and 300 mL of ange-colored pleural fluid was drained from the left chest  The patient tolerated the procedure well and suffered no complications  Impression: Impression: 1  Successful ultrasound-guided thoracentesis yielding 500 mL of pleural fluid from the right chest and 300 mL of pleural fluid from the left chest  Workstation performed: [id number]     VAS upper limb venous duplex scan, unilateral/limited    Result Date: 3/10/2022  Narrative:  [facility] REPORT CLINICAL: Indications:  Patient presents with left upper extremity edema x  several days  S/P left shoulder surgery 3/1/2022  Operative History: Patient denies any cardiovascular surgeries  Risk Factors The patient has history of HTN, Diabetes (Yes), Hyperlipidemia, CKD and previous smoking (quit >10yrs ago)    FINDINGS:  Left Thrombus  Ceph Mid Arm  Acute        CONCLUSION: Impression  RIGHT UPPER LIMB LIMITED: Evaluation shows no evidence of thrombus in the internal jugular vein, subclavian vein, and the brachiocephalic vein  LEFT UPPER LIMB: Evidence of superficial thrombophlebitis noted in the cephalic vein  No evidence of acute or chronic deep vein thrombosis  Doppler evaluation shows a normal response to augmentation maneuvers  Technically difficult/limited study due to immobile shoulder, pitting edema and poor visualization of deep system  SIGNATURE: Electronically Signed by: Loyda Flynn MD on 2022-03-10 06:23:16 PM    US kidney and bladder    Result Date: 3/15/2022  Narrative: RENAL ULTRASOUND INDICATION:   SEBASTIAN  COMPARISON: 7/31/2019 TECHNIQUE:   Ultrasound of the retroperitoneum was performed with a curvilinear transducer utilizing volumetric sweeps and still imaging techniques  FINDINGS: KIDNEYS: Symmetric and normal size  Right kidney:  10 7 x 5 0 x 6 2 cm  Volume 176 6 mL Left kidney:  11 2 x 6 4 x 5 5 cm  Volume 206 5 mL Right kidney Normal echogenicity and contour  No mass is identified  No hydronephrosis  No shadowing calculi  No perinephric fluid collections  Left kidney Normal echogenicity and contour  No mass is identified  No hydronephrosis  No shadowing calculi  No perinephric fluid collections  URETERS: Nonvisualized  BLADDER: The bladder is underdistended and suboptimally evaluated  Impression: No hydronephrosis  Workstation performed: [id number]     CTA ED chest PE Study    Result Date: 3/9/2022  Narrative: CTA - CHEST WITH IV CONTRAST - PULMONARY ANGIOGRAM INDICATION:   Shortness of breath  COMPARISON: 3/9/2022  TECHNIQUE: CTA examination of the chest was performed using angiographic technique according to a protocol specifically tailored to evaluate for pulmonary embolism  Axial, sagittal, and coronal 2D reformatted images were created from the source data and  submitted for interpretation    In addition, coronal 3D MIP postprocessing was performed on the acquisition scanner  Radiation dose length product (DLP) for this visit:  684 94 mGy-cm   This examination, like all CT scans performed in the [facility], was performed utilizing techniques to minimize radiation dose exposure, including the use of iterative  reconstruction and automated exposure control  IV Contrast:  85 mL of iohexol (OMNIPAQUE)  FINDINGS: PULMONARY ARTERIAL TREE:  No filling defect in the visualized pulmonary arteries to suggest acute embolus  LUNGS:  Bibasilar atelectasis  Few small airspace opacities scattered throughout the right lung could represent mild aspiration  No pulmonary interstitial edema  There is no tracheal or endobronchial lesion  PLEURA:  Small bilateral pleural effusions  HEART/GREAT VESSELS: Mild cardiomegaly  Aberrant right subclavian artery  No thoracic aortic aneurysm or dissection  MEDIASTINUM AND LUIGI:  No lymphadenopathy  CHEST WALL AND LOWER NECK:   Unremarkable  VISUALIZED STRUCTURES IN THE UPPER ABDOMEN:  Unremarkable  OSSEOUS STRUCTURES:  No acute fracture or destructive osseous lesion  Impression: 1  Small bilateral pleural effusions and bibasilar atelectasis  Few airspace opacities in the right lung could represent mild aspiration  2   No evidence of acute pulmonary embolus, thoracic aortic aneurysm or dissection  Workstation performed: [id number]     [address] [facility] bedside procedure    Result Date: 3/1/2022  Narrative: 1 2 840 [id number]  8 [id number]  9 [id number]    Echo complete w/ contrast if indicated    Result Date: 3/11/2022  Narrative: Deb Thomas  Left Ventricle: Left ventricular cavity size is normal  Wall thickness is mildly increased  The left ventricular ejection fraction is 53% by biplane measurement  Systolic function is low normal  Wall motion is normal  There is borderline concentric hypertrophy     Right Ventricle: Systolic function is low normal  Normal tricuspid annular plane systolic excursion (TAPSE) > 1 7 cm    Study Details: This transthoracic echocardiogram was performed at bedside  This was a routine, inpatient study  Study quality was poor  This was a technically difficult study due to decreased penetration, restricted mobility and poor acoustic windows  A complete 2D, color flow Doppler, spectral Doppler, 2D, color flow Doppler and spectral Doppler transthoracic echocardiogram was performed  The apical, parasternal, subcostal and suprasternal views were obtained  0 6 ml/min of Definity ultrasound enhancing agent was used due to opacify the left ventricle  Thiago Mejia  Prior TTE study available for comparison  Prior study date: 2019  No significant changes noted compared to the prior study  Cardiac testing:   Results for orders placed during the hospital encounter of 19    Echo complete with contrast if indicated    Todd Llamas [address]  [address] [address]  [phone]    Transthoracic Echocardiogram  2D, M-mode, Doppler, and Color Doppler    Study date:  29-May-2019    Patient: Vel Carolina  MR number: [id number]  Account number: [de-identified]  : 1950  Age: 79 years  Gender: Female  Status: Inpatient  Location: Bedside  Height: 62 in  Weight: 250 6 lb  BP: 133/ 62 mmHg    Indications: Tachycardia    Diagnoses: I11 9 - Hypertensive heart disease without heart failure    Sonographer:  QUETA Zuniga  Referring Physician:  Bowen Salvador MD  Group:  [facility]  Interpreting Physician:  Kristal Burns DO    SUMMARY    LEFT VENTRICLE:  Systolic function was normal by visual assessment  Ejection fraction was estimated to be 55 %  There were no regional wall motion abnormalities  Wall thickness was mildly to moderately increased  Doppler parameters were consistent with abnormal left ventricular relaxation (grade 1 diastolic dysfunction)      MITRAL VALVE:  There was mild regurgitation  AORTIC VALVE:  There was no evidence for stenosis  There was no regurgitation  TRICUSPID VALVE:  There was mild regurgitation  Pulmonary artery systolic pressure was within the normal range  HISTORY: PRIOR HISTORY: Diabetes,Diabetes, HTN, Hyperlipidemia, A Fib  PROCEDURE: The procedure was performed at the bedside  This was a routine study  The transthoracic approach was used  The study included complete 2D imaging, M-mode, complete spectral Doppler, and color Doppler  The heart rate was 77 bpm,  at the start of the study  Images were obtained from the parasternal, apical, subcostal, and suprasternal notch acoustic windows  Echocardiographic views were limited due to restricted patient mobility, poor patient compliance, poor  acoustic window availability, decreased penetration, and lung interference  This was a technically difficult study  LEFT VENTRICLE: Size was normal  Systolic function was normal by visual assessment  Ejection fraction was estimated to be 55 %  There were no regional wall motion abnormalities  Wall thickness was mildly to moderately increased  DOPPLER:  Doppler parameters were consistent with abnormal left ventricular relaxation (grade 1 diastolic dysfunction)  RIGHT VENTRICLE: The size was normal  Systolic function was normal     LEFT ATRIUM: The atrium was mildly dilated  RIGHT ATRIUM: Size was normal     MITRAL VALVE: Valve structure was normal  There was normal leaflet separation  No echocardiographic evidence for prolapse  DOPPLER: The transmitral velocity was within the normal range  There was no evidence for stenosis  There was mild  regurgitation  AORTIC VALVE: The valve was trileaflet  Leaflets exhibited normal thickness, normal cuspal separation, and sclerosis  DOPPLER: Transaortic velocity was within the normal range  There was no evidence for stenosis  There was no  regurgitation      TRICUSPID VALVE: The valve structure was normal  There was normal leaflet separation  DOPPLER: The transtricuspid velocity was within the normal range  There was mild regurgitation  Pulmonary artery systolic pressure was within the normal  range  Estimated peak PA pressure was 32 mmHg  PULMONIC VALVE: Leaflets exhibited normal thickness, no calcification, and normal cuspal separation  DOPPLER: The transpulmonic velocity was within the normal range  There was no regurgitation  PERICARDIUM: There was no thickening  There was no pericardial effusion  AORTA: The root exhibited normal size  PULMONARY ARTERY: The size was normal  The morphology appeared normal     SYSTEM MEASUREMENT TABLES    2D mode  AoR Diam 2D: 3 6 cm  LA Diam (2D): 3 9 cm  LA/Ao (2D): 1 08  FS (2D Teich): 26 9 %  IVSd (2D): 1 29 cm  LVDEV: 75 1 cmï¾³  LVESV: 35 3 cmï¾³  LVIDd(2D): 4 12 cm  LVISd (2D): 3 01 cm  LVOT Area 2D: 3 14 cmï¾²  LVPWd (2D): 1 2 cm  SV (Teich): 39 8 cmï¾³    Apical four chamber  LVEF A4C: 51 %    Unspecified Scan Mode  HANNA Cont Eq (Peak Gutierrez): 2 58 cmï¾²  LVOT Diam : 2 cm  LVOT Vmax: 963 mm/s  LVOT Vmax; Mean: 963 mm/s  Peak Grad ; Mean: 4 mm[Hg]  MV Peak A Gutierrez: 893 mm/s  MV Peak E Gutierrez  Mean: 805 mm/s  MVA (PHT): 3 67 cmï¾²  PHT: 60 ms  Max P mm[Hg]  V Max: 2360 mm/s  Vmax: 2430 mm/s  RA Area: 12 8 cmï¾²  RA Volume: 27 6 cmï¾³  TAPSE: 2 cm    [employer] Accredited [facility]    Prepared and electronically signed by    Jaden Kay DO  Signed 29-May-2019 16:19:07            Dr Soco Prado MD, [facility]      "This note has been constructed using a voice recognition system  Therefore there may be syntax, spelling, and/or grammatical errors   Please call if you have any questions  "

## 2022-03-22 NOTE — PROGRESS NOTES
NEPHROLOGY PROGRESS NOTE   Shaniqua Reinoso 73 y o  female MRN: [id number]  Unit/Bed#: [address] [place] [address] Encounter: [id number]  Reason for Consult: SEBASTIAN      SUMMARY:    73 y o  woman PMH of CKD G3b (creatinine were 1 4-1 6mg/dL) last time seen by Akosua Naidu in 2019, with multiple episodes of SEBASTIAN, DM, obesity, HTN, CHF, recent fall s/p left shoulder fracture, AFib on Eliquis p/w chest pain, elevated creatinine and fluid overload   Nephrology is consulted for SEBASTIAN in diuretic management     ASSESSMENT and PLAN:     Acute kidney injury  --admission creatinine 1 03 mg/dL, but presented volume overloaded and with CHF    Peak creatinine 2 44 mg/dL  --acute kidney injury in the setting of cardiorenal syndrome from congestive heart failure complicated by contrast associated nephropathy  --baseline creatinine in the mid 1s  --patient had been diuresing on Lasix drip, with net negative fluid balance being achieved, and maintain her creatinine at the high end of a baseline over the weekend  --creatinine stable at 2 1 from 2 16  --urinalysis was bland  --ultrasound showed no hydronephrosis  --Daily BMP  --avoid contrast studies  --no objections to discharge today, with outpatient follow up, message sent to office for a follow up  --can start Torsemide 10 mg daily tomorrow, repeat BMP in 1-2 weeks  --will likely need higher baseline to keep her close to euvolemic, and I suspect it will be high 1's to low 2's     Chronic kidney disease stage IIIB  --baseline creatinine mid 1's, may need to tolerate a slightly higher creatinine to keep out of volume overload and congestive heart failure  --presumed secondary to diabetic kidney disease hypertensive nephrosclerosis     Blood pressure/volume status  --history of hypertension  --volume overloaded with evidence of pulmonary edema and pleural effusion with weight gain  --patient diuresed with Lasix drip, which was discontinued this morning  --patient net -14 L since admission  --her weight has improved to 262 lb, which is essentially back to her dry weight  --can start Torsemide 10 mg tomorrow  --blood pressure control  --avoid angiotensin receptor blocker and ARB at this     Acute on chronic diastolic heart failure  --cardiology on board  --can start Torsemide 10 mg tomorrow   --currently on metoprolol and amlodipine  --back to her dry weight  --volume status appears to have improved     Status post fall  --resulting in a Comminuted displaced fracture of the proximal humerus   --status post left shoulder reverse complete arthroplasty on March 1st      SUBJECTIVE / INTERVAL HISTORY:    No chest pain or SOB    OBJECTIVE:  Current Weight: Weight - Scale: 119 kg (262 lb 12 6 oz)  Vitals:    03/21/22 2001 03/21/22 2324 03/22/22 0600 03/22/22 0801   BP: 132/66 (!) 122/49  123/50   BP Location:       Pulse: 62 65  62   Resp:   18 18   Temp: 97 7 °F (36 5 °C) 97 6 °F (36 4 °C)  (!) 97 3 °F (36 3 °C)   TempSrc:    Oral   SpO2: 100% 95%  100%   Weight:   119 kg (262 lb 12 6 oz)    Height:           Intake/Output Summary (Last 24 hours) at 3/22/2022 1006  Last data filed at 3/22/2022 0601  Gross per 24 hour   Intake 290 ml   Output 1200 ml   Net -910 ml       Review of Systems:    12 point ROS has been reviewed  Physical Exam  Vitals and nursing note reviewed  Constitutional:       General: She is not in acute distress  Appearance: She is well-developed  She is obese  HENT:      Head: Normocephalic and atraumatic  Eyes:      General: No scleral icterus  Conjunctiva/sclera: Conjunctivae normal       Pupils: Pupils are equal, round, and reactive to light  Cardiovascular:      Rate and Rhythm: Normal rate and regular rhythm  Heart sounds: S1 normal and S2 normal  No murmur heard  No friction rub  No gallop  Pulmonary:      Effort: Pulmonary effort is normal  No respiratory distress  Breath sounds: Normal breath sounds  No wheezing or rales     Abdominal:      General: Bowel sounds are normal       Palpations: Abdomen is soft  Tenderness: There is no abdominal tenderness  There is no rebound  Musculoskeletal:         General: Normal range of motion  Cervical back: Normal range of motion and neck supple  Right lower leg: Edema present  Left lower leg: Edema present  Skin:     Findings: No rash  Neurological:      Mental Status: She is alert and oriented to person, place, and time     Psychiatric:         Behavior: Behavior normal          Medications:    Current Facility-Administered Medications:     aluminum-magnesium hydroxide-simethicone (MYLANTA) oral suspension 30 mL, 30 mL, Oral, Q6H PRN, Saroj Morejon MD    amLODIPine (NORVASC) tablet 5 mg, 5 mg, Oral, Daily, Saroj Morejon MD, 5 mg at 03/22/22 0850    ammonium lactate (LAC-HYDRIN) 12 % lotion, , Topical, BID, Saroj Morejon MD, Given at 03/22/22 0849    apixaban (ELIQUIS) tablet 5 mg, 5 mg, Oral, BID, Saroj Morejon MD, 5 mg at 03/22/22 0850    docusate sodium (COLACE) capsule 100 mg, 100 mg, Oral, BID, Jerod Garcia MD, 100 mg at 03/22/22 0851    insulin glargine (LANTUS) subcutaneous injection 36 Units 0 36 mL, 36 Units, Subcutaneous, HS, Georgi Dye MD, 36 Units at 03/21/22 2145    insulin lispro (HumaLOG) 100 units/mL subcutaneous injection 1-5 Units, 1-5 Units, Subcutaneous, TID AC, 3 Units at 03/21/22 1724 **AND** Fingerstick Glucose (POCT), , , TID AC, Saroj Morejon MD    insulin lispro (HumaLOG) 100 units/mL subcutaneous injection 1-5 Units, 1-5 Units, Subcutaneous, HS, Saroj Morejon MD, 3 Units at 03/21/22 2147    lidocaine (LIDODERM) 5 % patch 2 patch, 2 patch, Topical, Daily, Saroj Morejon MD, 2 patch at 03/22/22 0849    loratadine (CLARITIN) tablet 10 mg, 10 mg, Oral, Daily, Saroj Morejon MD, 10 mg at 03/22/22 5973    magnesium hydroxide (MILK OF MAGNESIA) oral suspension 30 mL, 30 mL, Oral, Daily PRN, Saroj Morejon MD    metoprolol (LOPRESSOR) injection 5 mg, 5 mg, Intravenous, Q6H PRN, Michelle Anne MD    metoprolol tartrate (LOPRESSOR) tablet 50 mg, 50 mg, Oral, Q12H [address], Michelle Anne MD, 50 mg at 03/22/22 0850    montelukast (SINGULAIR) tablet 10 mg, 10 mg, Oral, HS, Michelle Anne MD, 10 mg at 03/21/22 2146    nitroglycerin (NITROSTAT) SL tablet 0 4 mg, 0 4 mg, Sublingual, Q5 Min PRN, Michelle Anne MD    nystatin (MYCOSTATIN) powder, , Topical, BID, Michelle Anne MD, Given at 03/22/22 0849    [facility]etron [facility]) injection 4 mg, 4 mg, Intravenous, Q6H PRN, Michelle Anne MD, 4 mg at 03/20/22 1624    pravastatin (PRAVACHOL) tablet 80 mg, 80 mg, Oral, Daily With Mejia Aguillon MD, 80 mg at 03/21/22 1559    pregabalin (LYRICA) capsule 100 mg, 100 mg, Oral, BID, Michelle Anne MD, 100 mg at 03/22/22 0850    traMADol (ULTRAM) tablet 50 mg, 50 mg, Oral, Q6H PRN, Mikey Rm PA-C, 50 mg at 03/20/22 1624    Laboratory Results:  Results from last 7 days   Lab Units 03/22/22  0607 03/21/22  0612 03/20/22  0609 03/19/22  0722 03/18/22  0650 03/17/22  0522 03/16/22  0616   WBC Thousand/uL  --   --   --   --  6 99  --   --    HEMOGLOBIN g/dL  --   --   --   --  8 8*  --   --    HEMATOCRIT %  --   --   --   --  30 7*  --   --    PLATELETS Thousands/uL  --   --   --   --  228  --   --    POTASSIUM mmol/L 3 9 4 2 4 1 4 5 5 1 5 2 5 3   CHLORIDE mmol/L 94* 95* 94* 98* 101 102 102   CO2 mmol/L 42* 43* 44* 44* 41* 35* 37*   BUN mg/dL 63* 57* 52* 51* 49* 53* 53*   CREATININE mg/dL 2 10* 2 16* 1 66* 1 65* 1 60* 1 83* 2 44*   CALCIUM mg/dL 7 6* 7 9* 8 2* 8 0* 7 9* 8 0* 7 6*       PLEASE NOTE:  This encounter was completed utilizing the [employer] Direct Speech Voice Recognition Software  Grammatical errors, random word insertions, pronoun errors and incomplete sentences are occasional consequences of the system due to software limitations, ambient noise and hardware issues  These may be missed by proof reading prior to affixing electronic signature   Any questions or concerns about the content, text or information contained within the body of this dictation should be directly addressed to the physician for clarification  Please do not hesitate to call me directly if you have any any questions or concerns

## 2022-03-22 NOTE — PHYSICAL THERAPY NOTE
PT TREATMENT     03/22/22 0855   PT Last Visit   PT Visit Date 03/22/22   Note Type   Note Type Treatment   Pain Assessment   Pain Assessment Tool 0-10   Pain Score No Pain   Restrictions/Precautions   LUE Weight Bearing Per Order NWB   Braces or Orthoses Sling   Other Precautions O2;Fall Risk   General   Chart Reviewed Yes   Subjective   Subjective "Good   Bed Mobility   Supine to Sit 4  Minimal assistance   Additional items Assist x 1;Verbal cues   Transfers   Sit to Stand 5  Supervision   Additional items Verbal cues   Stand to Sit 5  Supervision   Additional items Verbal cues   Toilet transfer 5  Supervision   Additional items Verbal cues  (Pt is dependent for hygiene after urination)   Ambulation/Elevation   Gait pattern   (Slow, steady kameron)   Gait Assistance   (Close S/S)   Additional items Assist x 1;Verbal cues; Tactile cues   Assistive Device SPC   Distance 10 ft to and from the bathroom; 50 ft x 2 in the hallway with change in direction with 2 L O2   Balance   Static Sitting Good   Static Standing Fair +   Dynamic Standing Fair   Ambulatory   (Mostly F to occasional F-)   Activity Tolerance   Activity Tolerance Patient limited by fatigue   Assessment   Assessment Pt with good tolerance to bed mobility, functional mobility/toileting, transfers, and ambulation with a cane  Will continue to progress pt as tolerated with functional mobility and gait  Pt declined stair training this morning stating she has 2 small stairs to get up and down when she goes to her niece's house and her niece and  will help her then  She also has a railing on the stairs  The patient's AM-PAC Basic Mobility Inpatient Short Form Raw Score is 17  A Raw score of greater than 16 suggests the patient may benefit from discharge to home  Please also refer to the recommendation of the Physical Therapist for safe discharge planning     Plan   Treatment/Interventions ADL retraining;Functional transfer training;LE strengthening/ROM; Elevations; Therapeutic exercise; Endurance training;Patient/family training;Equipment eval/education; Bed mobility;Gait training; Compensatory technique education   PT Frequency Other (Comment)  (5x/wk)   Recommendation   PT Discharge Recommendation Home with home health rehabilitation   Luna Aguilar 435   Turning in Bed Without Bedrails 2   Lying on Back to Sitting on Edge of Flat Bed 3   Moving Bed to Chair 3   Standing Up From Chair 3   Walk in Room 3   Climb 3-5 Stairs 3   Basic Mobility Inpatient Raw Score 17   Basic Mobility Standardized Score 39 67   Highest Level Of Mobility   JH-HL Goal 5: Stand one or more mins   JH-HLM Highest Level of Mobility 7: Walk 25 feet or more   JH-HLM Goal Achieved Yes   Education   Education Provided Mobility training;Assistive device   Patient Demonstrates acceptance/verbal understanding;Explanation/teachback used;Demonstrates verbal understanding   End of Consult   Patient Position at End of Consult Bedside chair; All needs within reach   [facility] Insurance Number  [facility] [id number]     Portions of the documentation may have been created using voice recognition software  Occasional wrong word or sound alike substitutions may have occurred due to the inherent limitation of the voice recognition software  Read the chart carefully and recognize, using context, where substitutions have occurred

## 2022-03-22 NOTE — ASSESSMENT & PLAN NOTE
Wt Readings from Last 3 Encounters:   03/22/22 119 kg (262 lb 12 6 oz)   03/08/22 135 kg (297 lb 9 9 oz)   03/03/22 127 kg (279 lb 15 8 oz)     · SOB POA, secondary to acute on chronic diastolic CHF  BNP 5992  CT shows small b/l plerual effusions   · Recent shoulder surgery, SEBASTIAN, not on diuretics at SNF (previously on torsemide 10mg/d)  · ECHO performed 3/10 shows EF 84%, systolic function low normal, borderline concentric hypertrophy   · Patient was treated with IV Lasix, transitioned to p o  torsemide however c/o SOB and repeat CXR showed persistent pulmonary congestion  She was transitioned back on IV Lasix and restarted on p o  Torsemide 20 mg on 03/17  Sx worsened again and patient was started on Lasix drip on 3/18    · Status post thoracentesis with removal of 500 cc on the right and 300 cc on the left, pleural fluid analysis suggests transudative effusions  · Most recent CXR shows improved pulmonary vascular congestion   · Continue beta-blocker, I/O, daily weights, and encouraged compliance with CHF diet  · Follow-up recommendations from cards/Nephrology regarding transition to p o  diuretics

## 2022-03-22 NOTE — CASE MANAGEMENT
Case Management Discharge Planning Note    Patient name Aneesh Tate  Location [address] [facility] 401/4 [address] 12-* MRN [id number]  : 1950 Date 3/22/2022       Current Admission Date: 3/9/2022  Current Admission Diagnosis:Acute diastolic congestive heart failure [facility])   Patient Active Problem List    Diagnosis Date Noted    [facility] ) 2022    Acute diastolic congestive heart failure ([facility] ) 03/10/2022    Shortness of breath 2022    Status post reverse total replacement of left shoulder 2022    Constipation 2022    Closed 4-part fracture of proximal humerus, left, initial encounter 2022    PONV (postoperative nausea and vomiting) 2022    SEBASTIAN (acute kidney injury) ([facility] ) 2022    Diabetes mellitus, type 2 ([facility]  ) 2022    Gastroesophageal reflux disease 2022    Nephrolithiasis 2022    Arthritis 2022    S/P total knee replacement, right 2022    On continuous oral anticoagulation - eliquis 2022    Humeral head fracture 2022    Essential hypertension 2019    Anemia 2019    Mixed hyperlipidemia 2019    Paroxysmal atrial fibrillation ([facility] ) 2019    Lower leg edema 2019    Asthma 2018    Morbid obesity with BMI of 45 0-49 9, adult ([facility] ) 2018      LOS (days): 13  Geometric Mean LOS (GMLOS) (days): 3 80  Days to GMLOS:-8 9     OBJECTIVE:  Risk of Unplanned Readmission Score: 27   Current admission status: Inpatient   Preferred Pharmacy:   [address] [place] [address],[address] , [address] [place] [id number]  Phone: [phone] Fax: [phone]    Primary Care Provider: Antwan Ware    Primary Insurance: MEDICARE  Secondary Insurance: REGINANA    DISCHARGE DETAILS:    Patient is written for dc  CM met with patient and discussed dc today   Confirmed dc plan for patient to go home with her niece Albaro [facility]  Confirmed that O2 concentrator was delivered to her home and portable tank is at bedside  Patients jaseyoshi Dank Chowdhury will transport patient home  DC, SOC/AVS faxed to [facility] in [address] [place] [address] Rd      IMM Given (Date):: 03/22/22  IMM Given to[de-identified] Patient (IMM was discussed with patient and she states understanding and agrees to dc home today )

## 2022-03-22 NOTE — PROGRESS NOTES
This CM Assistant received an ADT alert indicating the patient discharged 3/22/22 to Home with [facility]  I have removed myself off of the care team, added the CM to the care team who will follow the patient through the bundle episode, sent the care manager a inbasket notifying them of the bundle episode, updated the BPCI form, and updated the care coordination note

## 2022-03-22 NOTE — ASSESSMENT & PLAN NOTE
· Creatinine peaked 2 6  · Baseline Cr 1 4-1 6 with multiple episodes of SEBASTIAN  · Likely secondary to CRS in setting of CHF exacerbation complicated with contrast induced nephropathy  · Had CT contrast 3/9  · Nephrology consulted  · Might need accept higher creatinine to maintain euvolemic state  · Patient was started on Lasix drip, cr today 2 1 after drip was d/c 3/21   · Possible transition to p o   Diuretics over the next 24 hours based on creatinine

## 2022-03-22 NOTE — ASSESSMENT & PLAN NOTE
· S/p fall resulting in Comminuted displaced fracture of the proximal humerus (2/23/22)    · S/p left shoulder reverse complete arthroplasty (3/1/22)   · LUE venous duplex is negative for DVT  · PT/OT consults appreciated; recommending home with therapy  · OP ortho follow up

## 2022-03-22 NOTE — PLAN OF CARE
Problem: Nutrition/Hydration-ADULT  Goal: Nutrient/Hydration intake appropriate for improving, restoring or maintaining nutritional needs  Description: Monitor and assess patient's nutrition/hydration status for malnutrition  Collaborate with interdisciplinary team and initiate plan and interventions as ordered  Monitor patient's weight and dietary intake as ordered or per policy  Utilize nutrition screening tool and intervene as necessary  Determine patient's food preferences and provide high-protein, high-caloric foods as appropriate       INTERVENTIONS:  - Monitor oral intake, urinary output, labs, and treatment plans  - Assess nutrition and hydration status and recommend course of action  - Evaluate amount of meals eaten  - Assist patient with eating if necessary   - Allow adequate time for meals  - Recommend/ encourage appropriate diets, oral nutritional supplements, and vitamin/mineral supplements  - Order, calculate, and assess calorie counts as needed  - Recommend, monitor, and adjust tube feedings and TPN/PPN based on assessed needs  - Assess need for intravenous fluids  - Provide specific nutrition/hydration education as appropriate  - Include patient/family/caregiver in decisions related to nutrition  Outcome: Progressing     Problem: Prexisting or High Potential for Compromised Skin Integrity  Goal: Skin integrity is maintained or improved  Description: INTERVENTIONS:  - Identify patients at risk for skin breakdown  - Assess and monitor skin integrity  - Assess and monitor nutrition and hydration status  - Monitor labs   - Turn and reposition patient  - Assist with mobility/ambulation  - Relieve pressure over bony prominences  - Avoid friction and shearing  - Provide appropriate hygiene as needed including keeping skin clean and dry  - Evaluate need for skin moisturizer/barrier cream  - Collaborate with interdisciplinary team   - Patient/family teaching    Outcome: Progressing     Problem: Potential for Falls  Goal: Patient will remain free of falls  Description: INTERVENTIONS:  - Educate patient/family on patient safety including physical limitations  - Instruct patient to call for assistance with activity   - Consult OT/PT to assist with strengthening/mobility   - Keep Call bell within reach  - Keep bed low and locked with side rails adjusted as appropriate  - Keep care items and personal belongings within reach  - Initiate and maintain comfort rounds  - Make Fall Risk Sign visible to staff    - Initiate/Maintain alarm  - Obtain necessary fall risk management equipment:   - Apply yellow socks and bracelet for high fall risk patients  - Consider moving patient to room near nurses station  Outcome: Progressing     Problem: MOBILITY - ADULT  Goal: Maintain or return to baseline ADL function  Description: INTERVENTIONS:  - Educate patient/family on patient safety including physical limitations  - Instruct patient to call for assistance with activity   - Consult OT/PT to assist with strengthening/mobility   - Keep Call bell within reach  - Keep bed low and locked with side rails adjusted as appropriate  - Keep care items and personal belongings within reach  - Initiate and maintain comfort rounds  - Make Fall Risk Sign visible to staff    - Apply yellow socks and bracelet for high fall risk patients  - Consider moving patient to room near nurses station  Outcome: Progressing  Goal: Maintains/Returns to pre admission functional level  Description: INTERVENTIONS:  - Perform BMAT or MOVE assessment daily    - Set and communicate daily mobility goal to care team and patient/family/caregiver  - Collaborate with rehabilitation services on mobility goals if consulted  - Perform Range of Motion 2 times a day  - Reposition patient every 2 hours    - Dangle patient 2 times a day  - Stand patient 2 times a day  - Ambulate patient 2 times a day  - Out of bed to chair 2 times a day   - Out of bed for meals 2 times a day  - Out of bed for toileting  - Record patient progress and toleration of activity level   Outcome: Progressing     Problem: PAIN - ADULT  Goal: Verbalizes/displays adequate comfort level or baseline comfort level  Description: Interventions:  - Encourage patient to monitor pain and request assistance  - Assess pain using appropriate pain scale  - Administer analgesics based on type and severity of pain and evaluate response  - Implement non-pharmacological measures as appropriate and evaluate response  - Consider cultural and social influences on pain and pain management  - Notify physician/advanced practitioner if interventions unsuccessful or patient reports new pain  Outcome: Progressing     Problem: INFECTION - ADULT  Goal: Absence or prevention of progression during hospitalization  Description: INTERVENTIONS:  - Assess and monitor for signs and symptoms of infection  - Monitor lab/diagnostic results  - Monitor all insertion sites, i e  indwelling lines, tubes, and drains  - Monitor endotracheal if appropriate and nasal secretions for changes in amount and color  - Lipan appropriate cooling/warming therapies per order  - Administer medications as ordered  - Instruct and encourage patient and family to use good hand hygiene technique  - Identify and instruct in appropriate isolation precautions for identified infection/condition  Outcome: Progressing     Problem: SAFETY ADULT  Goal: Patient will remain free of falls  Description: INTERVENTIONS:  - Educate patient/family on patient safety including physical limitations  - Instruct patient to call for assistance with activity   - Consult OT/PT to assist with strengthening/mobility   - Keep Call bell within reach  - Keep bed low and locked with side rails adjusted as appropriate  - Keep care items and personal belongings within reach  - Initiate and maintain comfort rounds  - Make Fall Risk Sign visible to staff    - Initiate/Maintain alarm  - Obtain necessary fall risk management equipment:   - Apply yellow socks and bracelet for high fall risk patients  - Consider moving patient to room near nurses station  Outcome: Progressing  Goal: Maintain or return to baseline ADL function  Description: INTERVENTIONS:  - Educate patient/family on patient safety including physical limitations  - Instruct patient to call for assistance with activity   - Consult OT/PT to assist with strengthening/mobility   - Keep Call bell within reach  - Keep bed low and locked with side rails adjusted as appropriate  - Keep care items and personal belongings within reach  - Initiate and maintain comfort rounds  - Make Fall Risk Sign visible to staff    - Apply yellow socks and bracelet for high fall risk patients  - Consider moving patient to room near nurses station  Outcome: Progressing  Goal: Maintains/Returns to pre admission functional level  Description: INTERVENTIONS:  - Perform BMAT or MOVE assessment daily    - Set and communicate daily mobility goal to care team and patient/family/caregiver  - Collaborate with rehabilitation services on mobility goals if consulted  - Perform Range of Motion 2 times a day  - Reposition patient every 2 hours    - Dangle patient 2 times a day  - Stand patient 2 times a day  - Ambulate patient 2 times a day  - Out of bed to chair 2 times a day   - Out of bed for meals 2 times a day  - Out of bed for toileting  - Record patient progress and toleration of activity level   Outcome: Progressing     Problem: DISCHARGE PLANNING  Goal: Discharge to home or other facility with appropriate resources  Description: INTERVENTIONS:  - Identify barriers to discharge w/patient and caregiver  - Arrange for needed discharge resources and transportation as appropriate  - Identify discharge learning needs (meds, wound care, etc )  - Arrange for interpretive services to assist at discharge as needed  - Refer to Case Management Department for coordinating discharge planning if the patient needs post-hospital services based on physician/advanced practitioner order or complex needs related to functional status, cognitive ability, or social support system  Outcome: Progressing     Problem: Knowledge Deficit  Goal: Patient/family/caregiver demonstrates understanding of disease process, treatment plan, medications, and discharge instructions  Description: Complete learning assessment and assess knowledge base    Interventions:  - Provide teaching at level of understanding  - Provide teaching via preferred learning methods  Outcome: Progressing     Problem: CARDIOVASCULAR - ADULT  Goal: Maintains optimal cardiac output and hemodynamic stability  Description: INTERVENTIONS:  - Monitor I/O, vital signs and rhythm  - Monitor for S/S and trends of decreased cardiac output  - Assess quality of pulses, skin color and temperature  - Instruct patient to report change in severity of symptoms  Outcome: Progressing     Problem: RESPIRATORY - ADULT  Goal: Achieves optimal ventilation and oxygenation  Description: INTERVENTIONS:  - Assess for changes in respiratory status  - Assess for changes in mentation and behavior  - Position to facilitate oxygenation and minimize respiratory effort  - Oxygen administered by appropriate delivery if ordered  - Initiate smoking cessation education as indicated  - Encourage broncho-pulmonary hygiene including cough, deep breathe, Incentive Spirometry  - Assess the need for suctioning and aspirate as needed  - Assess and instruct to report SOB or any respiratory difficulty  - Respiratory Therapy support as indicated  Outcome: Progressing

## 2022-03-22 NOTE — DISCHARGE SUMMARY
Ever[place] 47  Discharge- Arthuro Haley 1950, 73 y o  female MRN: [id number]  Unit/Bed#: [address] [place] [address] Encounter: [id number]  Primary Care Provider: Ute Mancuso   Date and time admitted to hospital: 3/9/2022  4:26 PM    * Acute diastolic congestive heart failure [facility])  Assessment & Plan  Wt Readings from Last 3 Encounters:   03/22/22 119 kg (262 lb 12 6 oz)   03/08/22 135 kg (297 lb 9 9 oz)   03/03/22 127 kg (279 lb 15 8 oz)     · SOB POA, secondary to acute on chronic diastolic CHF  BNP 5992  CT shows small b/l plerual effusions   · Recent shoulder surgery, SEBASTIAN, not on diuretics at SNF (previously on torsemide 10mg/d)  · ECHO performed 3/10 shows EF 17%, systolic function low normal, borderline concentric hypertrophy   · Patient was treated with IV Lasix, transitioned to p o  torsemide however c/o SOB and repeat CXR showed persistent pulmonary congestion  She was transitioned back on IV Lasix and restarted on p o  Torsemide 20 mg on 03/17  Sx worsened again and patient was started on Lasix drip on 3/18    · Status post thoracentesis with removal of 500 cc on the right and 300 cc on the left, pleural fluid analysis suggests transudative effusions  · Most recent CXR shows improved pulmonary vascular congestion   · Continue beta-blocker, I/O, daily weights, and encouraged compliance with CHF diet  · Follow-up recommendations from cards/Nephrology regarding transition to p o  diuretics     Acute respiratory failure with hypoxia (HCC)  Assessment & Plan  · Likely 2/2 decompensated CHF   · Procalcitonin negative - would hold off abx for aspiration related air space opacities seen on CT    · CTA chest negative for pulmonary embolism   · Home o2 eval per RRT appreciated   · Pt arranged for home O2   · Continue 2L NC     SEBASTIAN (acute kidney injury) ([facility] [address] )  Assessment & Plan  · Creatinine peaked 2 6  · Baseline Cr 1 4-1 6 with multiple episodes of SEBASTIAN  · Likely secondary to CRS in setting of CHF exacerbation complicated with contrast induced nephropathy  · Had CT contrast 3/9  · Nephrology consulted  · Might need accept higher creatinine to maintain euvolemic state  · Patient was started on Lasix drip, cr today 2 1 after drip was d/c 3/21   · Transition to PO torsemide 10mg daily tomorrow    Diabetes mellitus, type 2 [facility])  Assessment & Plan  Lab Results   Component Value Date    HGBA1C 6 8 (H) 03/09/2022     Recent Labs     03/21/22  1128 03/21/22  1601 03/21/22 2034 03/22/22  0717   POCGLU 253* 296* 361* 139     Blood Sugar Average: Last 72 hrs:  (P) 285 [id number]   · Blood sugars continued remained uncontrolled  HA1C indicated good control  · Continue Lantus 36 units daily  · Continue Humalog sliding scale    Status post reverse total replacement of left shoulder  Assessment & Plan  · S/p fall resulting in Comminuted displaced fracture of the proximal humerus (2/23/22)  · S/p left shoulder reverse complete arthroplasty (3/1/22)   · LUE venous duplex is negative for DVT  · PT/OT consults appreciated; recommending home with therapy  · OP ortho follow up     Paroxysmal atrial fibrillation ([address] )  Assessment & Plan  · On arrival rapid a fib with RVR, responded to IV lopressor  · Continue metoprolol tartrate 50 mg PO BID at home   · DEMETRIS Vasc score 3, Dr Hipolito Neal cleared to resume Eliquis 5 mg p o  B i d     Essential hypertension  Assessment & Plan  · On lopressor PO; increased to 50 mg p o  Q 12 hours during hospitalization    · Continue Norvasc    Anemia  Assessment & Plan  · Hgb stable, likely worsened secondary to post op loss   · Hemoglobin has been stable in the range of 8    Mixed hyperlipidemia  Assessment & Plan  · On rosuvastatin    Morbid obesity with BMI of 45 0-49 9, adult ([facility])  Assessment & Plan  · Counseled weight loss   · Sleep apnea, refusing CPAP       Medical Problems             Resolved Problems  Date Reviewed: 3/22/2022          Resolved    Nausea & vomiting 3/10/2022 Resolved by  John Hill PA-C    Chest pain 3/10/2022     Resolved by  John Hill PA-C              Discharging Physician / Practitioner: John Hill PA-C  PCP: Musa Coles  Admission Date:   Admission Orders (From admission, onward)     Ordered        03/09/22 2051  INPATIENT ADMISSION  Once                      Discharge Date: 03/22/22    Consultations During Hospital Stay:  · Ortho surgery   · Nephrology  · Cardiology   · IR     Procedures Performed:   · Bilateral thoracentesis     Significant Findings / Test Results:   · Acute resp failure with hypoxia  · SEBASTIAN likely CRS   · Decompensated CHF     Incidental Findings:   · None     Test Results Pending at Discharge (will require follow up): · None      Outpatient Tests Requested:  · Follow up with PCP cards Renal Ortho     Complications:  None     Reason for Admission: SOB & CP     Hospital Course:   Margarita Fernandez is a 73 y o  female patient with past medical history significant for chronic diastolic heart failure, paroxysmal AFib, type 2 diabetes, morbid obesity, hypertension, hyperlipidemia, who originally presented to the hospital on 3/9/2022 due to chest pain and shortness of breath  Patient was sent from nursing home with these complaints where she was undergoing short-term rehab following a left shoulder surgery by Orthopedics  Patient appeared to be in decompensated heart failure and was started on IV Lasix  She was also found to be in atrial fibrillation with RVR which responded to IV metoprolol  Patient was seen by Cardiology for ongoing management of fluid overload  Patient was eventually transition to p o  Diuretics however had persistent symptoms and IV Lasix was resumed  This occurred twice and patient was eventually placed on Lasix drip  Nephrology was consulted  Patient's renal function was stable on Lasix drip however bumped to above 2 with baseline in the mid 1s    Patient was taken off of Lasix drip on 03/21   She will be transition to p o  Diuretics at the discretion of Cardiology and Nephrology  Patient was also evaluated by Ortho surgery who recommended outpatient follow-up  She was cleared to resume her Eliquis  Patient was also evaluated by physical therapy and occupational therapy and did quite well, and will be discharged home with home health  Patient is medically stable at this time for discharge home in the care of her niece  Patient and family expressed understanding and agreed with plan  She will need close outpatient follow-up with her PCP, Ortho surgery, Cardiology and Nephrology  She was educated on a cardiac diet important for maintaining dry weight and compensated heart failure  Please see above list of diagnoses and related plan for additional information  Condition at Discharge: stable    Discharge Day Visit / Exam:   * Please refer to separate progress note for these details *    Discussion with Family: Updated  (niece) via phone  Discharge instructions/Information to patient and family:   See after visit summary for information provided to patient and family  Provisions for Follow-Up Care:  See after visit summary for information related to follow-up care and any pertinent home health orders  Disposition:   Home with VNA Services (Reminder: Complete face to face encounter)    Planned Readmission: none     Discharge Statement:  I spent 45 minutes discharging the patient  This time was spent on the day of discharge  I had direct contact with the patient on the day of discharge  Greater than 50% of the total time was spent examining patient, answering all patient questions, arranging and discussing plan of care with patient as well as directly providing post-discharge instructions  Additional time then spent on discharge activities  Discharge Medications:  See after visit summary for reconciled discharge medications provided to patient and/or family  **Please Note: This note may have been constructed using a voice recognition system**

## 2022-03-22 NOTE — DISCHARGE INSTR - AVS FIRST PAGE
Start torsemide 10mg daily on [facility] 3/23  Repeat lab work at a [facility]'s lab in 1-2 weeks   Please follow a low salt diet ( less than 2 gm of sodium per day) - read food labels!! Monitor fluid intake and do not exceed 1  L of fluids       Follow up with nephrology     Follow up with cardiology     Establish care with pcp     Wear O2 with goal saturation above 90%   You can purchase O2 pulse oximeter at [facility], [facility], etc      Follow up with ortho Dr Charisse Mark

## 2022-03-22 NOTE — ASSESSMENT & PLAN NOTE
· Likely 2/2 decompensated CHF   · Procalcitonin negative - would hold off abx for aspiration related air space opacities seen on CT    · CTA chest negative for pulmonary embolism   · Home o2 eval per RRT appreciated   · Pt arranged for home O2   · Continue 2L NC

## 2022-03-23 ENCOUNTER — TELEPHONE (OUTPATIENT)
Dept: NEPHROLOGY | Facility: CLINIC | Age: 72
End: 2022-03-23

## 2022-03-23 ENCOUNTER — TELEPHONE (OUTPATIENT)
Dept: OBGYN CLINIC | Facility: OTHER | Age: 72
End: 2022-03-23

## 2022-03-23 NOTE — TELEPHONE ENCOUNTER
Patients relative , Wyatt gelyindra called in to see if you can send some Tramadol in for her recent ED visit      [address] [facility] [address] MACE/b # [phone]

## 2022-03-24 ENCOUNTER — PATIENT OUTREACH (OUTPATIENT)
Dept: CASE MANAGEMENT | Facility: HOSPITAL | Age: 72
End: 2022-03-24

## 2022-03-24 NOTE — PROGRESS NOTES
Inbasket notification received for hand off of pateint enrolled in [facility] of UE Medicare Bundle patient, discharged from [address]  [place] with VNA services provided by [facility]  [facility] and [facility]  Chart review completed  Patient hospitalized   3/1/2022 - 3/7/2022 (6 days) for left reverse total shoulder replacement  2/16/2022 - 2/20/2022 (4 days) [facility] - for Humeral head fracture, left  Review of IP CM note indicates patient lives alone in apartment, 5 steps to enter  Patient reportedly independent with ADLs - no assistive devices used  DME at home:  [address]  Past Medical History  DM2 (HGBA1C 6 8 drawn 3/9/22),   See problem list for complete list of medical diagnosis  Future appointments:  3/25/2022 1:00 PM Appointment with Elijah Blount MD at [facility] Internal Medicine ([phone])   3/25/2022 3:15 PM Appointment with Murali Esteban PA-C [facility] ([phone])   3/28/2022 1:40 PM Appointment with Campos Hutchins MD at [facility] ([phone])   3/30/2022 3:40 PM Appointment with Jaime Lama MD at [facility] [address] ([phone])     CM contacted patient to introduce self, explain the role of the OP CM and purpose of this phone call is to assess patient's general wellbeing or for any assistance needed with follow-up care  BPCI-A program explained  Shelley Douglass consented to future outreach of CM  Patient informed CM she discharged home from hospital to her nieces home and has decided to permanently move in  She will reside with her niece, her nieces  and their three children (ages 11, 1 and 2)  [address] [address]  She acknowledges her future appointments stating her niece will be providing transportation    She is scheduled to establish care with Dr Danny Aase tomorrow for which CM informed her that once established patient would be handed off to embedded RNCM  Patient interested in having her niece become her paid caregiver for which CM will inform embedded CM in handoff once established  Medications reviewed with patient who states she is no longer taking any pain pills, Zofran or stool softener  She plans to ask new PCP for pain medication  CM informed patient to refrain from NSAIDs due to history of CKD  Patient acknowledges the same stating she was hoping he would prescribe tramadol  Education provided regarding CHF, symptoms and red flags of when to call the doctor  Patient immediately informed CM She knows to weigh herself daily and does not use any table salt when preparing foods  States her niece prepares fresh or frozen  CM emphasized to take all medications as prescribed, follow a heart healthy diet and to call doctor for 1) weight gain of 3 pounds in day or 5 pounds in 1 week, 2) edema to BLE or abdomen and 3) SOB that does not resolve with rest  Patient verbalized same stating her weight yesterday was 256 pounds and today it was 258 pounds  Rosa [place] reports being told to call if she gains three or four pounds and confirms receiving congestive heart failure booklet while in patient  She informed CM that she is also not taking the metoprolol tartrate as her blood pressure yesterday was low when the visiting nurses started her care period CM encouraged her to discuss all medications at PCP visit tomorrow  Patient does admit to using a Walker with ambulation for far distances and has wheelchair as well  Rosa Reece informed CM of long history with diabetes (40 years)  She states that she can tell when her sugar is high as she has a sensation in her face and when it is too low she feels deflated     She states she is not under the care of endocrinology and would call the doctor for blood sugar reading greater than 400      CM encouraged patient to call provider for blood sugars consistently greater than 300  Guillermo Benites is checking her blood sugar twice daily:  fasting and at dinner  States her fasting blood sugar level yesterday was 250 and today was 268  Patient is not currently under the care of pulmonology but would like to establish within [employer]  She states history of asthma and has environmental allergies (cigarette smoke and perfume)  Reminder sent to self with closure date of medicare bundle  This CM will continue to monitor through chart review, outreach and Evon Luu

## 2022-03-25 ENCOUNTER — OFFICE VISIT (OUTPATIENT)
Dept: OBGYN CLINIC | Facility: CLINIC | Age: 72
End: 2022-03-25

## 2022-03-25 ENCOUNTER — OFFICE VISIT (OUTPATIENT)
Dept: INTERNAL MEDICINE CLINIC | Facility: CLINIC | Age: 72
End: 2022-03-25
Payer: MEDICARE

## 2022-03-25 ENCOUNTER — TELEPHONE (OUTPATIENT)
Dept: OBGYN CLINIC | Facility: CLINIC | Age: 72
End: 2022-03-25

## 2022-03-25 ENCOUNTER — TRANSITIONAL CARE MANAGEMENT (OUTPATIENT)
Dept: INTERNAL MEDICINE CLINIC | Facility: CLINIC | Age: 72
End: 2022-03-25

## 2022-03-25 ENCOUNTER — TELEPHONE (OUTPATIENT)
Dept: OBGYN CLINIC | Facility: HOSPITAL | Age: 72
End: 2022-03-25

## 2022-03-25 ENCOUNTER — APPOINTMENT (OUTPATIENT)
Dept: RADIOLOGY | Facility: CLINIC | Age: 72
End: 2022-03-25
Payer: MEDICARE

## 2022-03-25 VITALS — WEIGHT: 259 LBS | OXYGEN SATURATION: 97 % | HEIGHT: 62 IN | BODY MASS INDEX: 47.66 KG/M2 | HEART RATE: 59 BPM

## 2022-03-25 DIAGNOSIS — J45.909 MILD ASTHMA WITHOUT COMPLICATION, UNSPECIFIED WHETHER PERSISTENT: ICD-10-CM

## 2022-03-25 DIAGNOSIS — E66.01 MORBID OBESITY WITH BMI OF 45.0-49.9, ADULT (HCC): ICD-10-CM

## 2022-03-25 DIAGNOSIS — I50.31 ACUTE DIASTOLIC CONGESTIVE HEART FAILURE (HCC): Primary | ICD-10-CM

## 2022-03-25 DIAGNOSIS — N18.30 TYPE 2 DIABETES MELLITUS WITH STAGE 3 CHRONIC KIDNEY DISEASE, WITH LONG-TERM CURRENT USE OF INSULIN, UNSPECIFIED WHETHER STAGE 3A OR 3B CKD (HCC): ICD-10-CM

## 2022-03-25 DIAGNOSIS — J96.01 ACUTE RESPIRATORY FAILURE WITH HYPOXIA (HCC): ICD-10-CM

## 2022-03-25 DIAGNOSIS — Z79.4 TYPE 2 DIABETES MELLITUS WITH STAGE 3 CHRONIC KIDNEY DISEASE, WITH LONG-TERM CURRENT USE OF INSULIN, UNSPECIFIED WHETHER STAGE 3A OR 3B CKD (HCC): ICD-10-CM

## 2022-03-25 DIAGNOSIS — S42.292A CLOSED FRACTURE OF HEAD OF LEFT HUMERUS, INITIAL ENCOUNTER: ICD-10-CM

## 2022-03-25 DIAGNOSIS — N18.32 STAGE 3B CHRONIC KIDNEY DISEASE (HCC): ICD-10-CM

## 2022-03-25 DIAGNOSIS — I73.00 RAYNAUD'S DISEASE WITHOUT GANGRENE: ICD-10-CM

## 2022-03-25 DIAGNOSIS — N17.9 AKI (ACUTE KIDNEY INJURY) (HCC): ICD-10-CM

## 2022-03-25 DIAGNOSIS — K59.00 CONSTIPATION, UNSPECIFIED CONSTIPATION TYPE: ICD-10-CM

## 2022-03-25 DIAGNOSIS — I48.0 PAROXYSMAL ATRIAL FIBRILLATION (HCC): ICD-10-CM

## 2022-03-25 DIAGNOSIS — E78.2 MIXED HYPERLIPIDEMIA: ICD-10-CM

## 2022-03-25 DIAGNOSIS — I10 ESSENTIAL HYPERTENSION: ICD-10-CM

## 2022-03-25 DIAGNOSIS — Z96.612 STATUS POST REVERSE TOTAL SHOULDER REPLACEMENT, LEFT: Primary | ICD-10-CM

## 2022-03-25 DIAGNOSIS — R60.0 LOWER LEG EDEMA: ICD-10-CM

## 2022-03-25 DIAGNOSIS — D64.9 ANEMIA, UNSPECIFIED TYPE: ICD-10-CM

## 2022-03-25 DIAGNOSIS — E11.22 TYPE 2 DIABETES MELLITUS WITH STAGE 3 CHRONIC KIDNEY DISEASE, WITH LONG-TERM CURRENT USE OF INSULIN, UNSPECIFIED WHETHER STAGE 3A OR 3B CKD (HCC): ICD-10-CM

## 2022-03-25 PROBLEM — B02.23 POST-HERPETIC POLYNEUROPATHY: Status: ACTIVE | Noted: 2022-03-25

## 2022-03-25 PROBLEM — I87.2 PERIPHERAL VENOUS INSUFFICIENCY: Status: ACTIVE | Noted: 2022-03-25

## 2022-03-25 PROBLEM — R91.1 SOLITARY PULMONARY NODULE: Status: ACTIVE | Noted: 2022-03-25

## 2022-03-25 PROCEDURE — 73030 X-RAY EXAM OF SHOULDER: CPT

## 2022-03-25 PROCEDURE — 99024 POSTOP FOLLOW-UP VISIT: CPT | Performed by: PHYSICIAN ASSISTANT

## 2022-03-25 PROCEDURE — 99215 OFFICE O/P EST HI 40 MIN: CPT | Performed by: INTERNAL MEDICINE

## 2022-03-25 RX ORDER — ALBUTEROL SULFATE 90 UG/1
2 AEROSOL, METERED RESPIRATORY (INHALATION) EVERY 6 HOURS PRN
Qty: 8 G | Refills: 1 | Status: SHIPPED | OUTPATIENT
Start: 2022-03-25 | End: 2022-07-28 | Stop reason: SDUPTHER

## 2022-03-25 RX ORDER — AMLODIPINE BESYLATE 5 MG/1
5 TABLET ORAL DAILY
Qty: 30 TABLET | Refills: 1 | Status: SHIPPED | OUTPATIENT
Start: 2022-03-25 | End: 2022-04-02 | Stop reason: HOSPADM

## 2022-03-25 RX ORDER — INSULIN GLARGINE 300 U/ML
20 INJECTION, SOLUTION SUBCUTANEOUS
Status: ON HOLD | COMMUNITY
Start: 2022-02-28 | End: 2022-04-02 | Stop reason: SDUPTHER

## 2022-03-25 RX ORDER — DOCUSATE SODIUM 100 MG/1
100 CAPSULE, LIQUID FILLED ORAL DAILY
COMMUNITY

## 2022-03-25 NOTE — ASSESSMENT & PLAN NOTE
Diabetes 40 years, positive history for diabetes in father mother and 1 of the grandparents  Patient is also on insulin  Patient is on Toujeo for 10+ years  currently on 20 units at nighttime only  Check sugar twice a day appetite is fair and normal   Watch his diabetic diet  Yesterday's sugar was in 250-300 range this morning again it was around 250 range  Baseline need for to [facility] was 40 units twice a day in the past before she got sick      The recommend to increase by 5 units and make it 25 units at nighttime  Lab Results   Component Value Date    HGBA1C 6 8 (H) 03/09/2022

## 2022-03-25 NOTE — ASSESSMENT & PLAN NOTE
The acute respiratory failure is better     The sees on oxygen 2 L per minute    Known case of a restrictive pulmonary disease as well as recent CHF

## 2022-03-25 NOTE — ASSESSMENT & PLAN NOTE
Paroxysmal atrial fibrillations for almost 2 years  Remains on anticoagulation as well as beta-blocker for the rate control      Recommend to hold metoprolol if the systolic blood pressure less than 90 or heart rate less than 60

## 2022-03-25 NOTE — ASSESSMENT & PLAN NOTE
Wt Readings from Last 3 Encounters:   03/25/22 117 kg (259 lb)   03/22/22 119 kg (262 lb 12 6 oz)   03/08/22 135 kg (297 lb 9 9 oz)         Home weight is in the range of 256-259  Of they are educated about the range of 2532-62  Outside that will sees to let us know    Continue same regimen    Weight torsemide metoprolol diabetes control anti coag

## 2022-03-25 NOTE — TELEPHONE ENCOUNTER
Pt sees Dr Cande Bianchi    Pt visiting nurse is calling stating  That the patient is on services with the for PT and OT and is not sure if Dr Iris Veloz will get the visit notes and would like a call back from Tonya Valentin    # [phone]

## 2022-03-25 NOTE — PROGRESS NOTES
Assessment/Plan:  1  Status post reverse total shoulder replacement, left  XR shoulder 2+ vw left    Ambulatory Referral to [address] [place] [address] Kendrick Saavedra       The patient is doing well and will start home PT at this point on the reverse total shoulder replacement protocol  She will remain in her sling for the first 6 weeks post operatively  She will follow-up in 3 weeks for repeat evaluation  We hope to wean her from her sling at that time  Subjective:   Chino Brandt is a 73 y o  female who presents today for follow-up of her left shoulder, now about 3 weeks status post reverse total shoulder replacement for fracture  She has been compliant with wearing her sling  She notes that her pain has been well controlled  She notes good sensation of the left upper extremity  She did just return home from short term rehab         Review of Systems      Past Medical History:   Diagnosis Date    A-fib ([facility] [address] )     Anemia     Asthma     CH[facility]congestive [facility] failure) [facility])     Chronic kidney disease     CKD (chronic kidney disease)     COVID-19     January 2022    Diabetes mellitus ([facility] [address] )     GERD (gastroesophageal reflux disease)     Hyperlipidemia     Kidney stones     PONV (postoperative nausea and vomiting)     SVT (supraventricular tachycardia) ([facility])        Past Surgical History:   Procedure Laterality Date    APPENDECTOMY      CYSTOSCOPY W/ LASER LITHOTRIPSY Left 7/12/2016    Procedure: CYSTOSCOPY URETEROSCOPY WITH LITHOTRIPSY HOLMIUM LASER, RETROGRADE PYELOGRAM AND INSERTION STENT URETERAL;  Surgeon: Lorri English MD;  Location: [address] [place] [address];  Service:     DILATION AND CURETTAGE OF UTERUS      IR THORACENTESIS  3/18/2022    JOINT REPLACEMENT      right knee    KNEE ARTHROPLASTY Right     PA CYSTOURETHROSCOPY,URETER CATHETER Left 6/29/2016    Procedure: CYSTOSCOPY RETROGRADE PYELOGRAM WITH INSERTION STENT URETERAL, left;  Surgeon: Lorri English MD;  Location: [facility];  Service: Urology    PA RECONSTR TOTAL SHOULDER IMPLANT Left 3/1/2022    Procedure: ARTHROPLASTY SHOULDER REVERSE;  Surgeon: Vicky Min MD;  Location: [facility];  Service: Orthopedics    SHOULDER ARTHROTOMY Left        Family History   Problem Relation Age of Onset    Heart disease Mother     Emphysema Maternal Grandmother     Heart disease Family        Social History     Occupational History    Not on file   Tobacco Use    Smoking status: Former Smoker     Packs/day: 1 00     Years: 20 00     Pack years: 20 00     Types: Cigarettes     Quit date: 1996     Years since quittin 7    Smokeless tobacco: Never Used   Vaping Use    Vaping Use: Never used   Substance and Sexual Activity    Alcohol use: Not Currently     Comment: rarely    Drug use: No    Sexual activity: Not Currently       No current facility-administered medications for this visit  No current outpatient medications on file      Facility-Administered Medications Ordered in Other Visits:     acetaminophen (TYLENOL) tablet 650 mg, 650 mg, Oral, Q6H PRN, Jose Alfredo Badillo MD, 650 mg at 22 0352    albuterol (PROVENTIL HFA,VENTOLIN HFA) inhaler 2 puff, 2 puff, Inhalation, Q6H PRN, Jose Alfredo Badillo MD    aluminum-magnesium hydroxide-simethicone (MYLANTA) oral suspension 30 mL, 30 mL, Oral, Q6H PRN, Jose Alfredo Badillo MD    amLODIPine (NORVASC) tablet 5 mg, 5 mg, Oral, Daily, Jose Alfredo Badillo MD    apixaban (ELIQUIS) tablet 5 mg, 5 mg, Oral, BID, Jose Alfredo Badillo MD    atorvastatin (LIPITOR) tablet 80 mg, 80 mg, Oral, Daily With Dinner, Jose Alfredo Badillo MD    bisacodyl (DULCOLAX) rectal suppository 10 mg, 10 mg, Rectal, Daily PRN, Jose Alfredo Badillo MD    diltiazem (CARDIZEM) injection 15 mg, 15 mg, Intravenous, Once PRN, Jose Alfredo Badillo MD    docusate sodium (COLACE) capsule 100 mg, 100 mg, Oral, Daily, Jose Alfredo Badillo MD    insulin glargine (LANTUS) subcutaneous injection 20 Units 0 2 mL, 20 Units, Subcutaneous, HS, Jose Alfredo Badillo MD, 20 Units at 22 0232    insulin lispro (HumaLOG) 100 units/mL subcutaneous injection 1-6 Units, 1-6 Units, Subcutaneous, HS, Jose Alfredo Badillo MD, 6 Units at 03/28/22 2342    insulin lispro (HumaLOG) 100 units/mL subcutaneous injection 2-12 Units, 2-12 Units, Subcutaneous, TID AC, 4 Units at 03/29/22 0804 **AND** Fingerstick Glucose (POCT), , , TID AC, Jose Alfredo Badillo MD    metoprolol tartrate (LOPRESSOR) tablet 50 mg, 50 mg, Oral, Q12H [facility], Jose Alfredo Badillo MD, 50 mg at 03/28/22 2344    montelukast (SINGULAIR) tablet 10 mg, 10 mg, Oral, HS, Jose Alfredo Badillo MD, 10 mg at 03/28/22 2344    nystatin (MYCOSTATIN) powder, , Topical, BID, Jose Alfredo Badillo MD    ondansetron [facility]) injection 4 mg, 4 mg, Intravenous, Q6H PRN, Jose Alfredo Badillo MD    torsemide [facility]) tablet 10 mg, 10 mg, Oral, Daily, Jose Alfredo Badillo MD    Allergies   Allergen Reactions    Penicillins Hives    Moxifloxacin Other (See Comments)     unknown    Percocet [Oxycodone-Acetaminophen] Other (See Comments)     unknown    Zinc Acetate Other (See Comments)     unknown    Asa [Aspirin] GI Intolerance    Indocin [Indomethacin] Other (See Comments)     Made patient "loopy"    Other Other (See Comments)     unknown       Objective: There were no vitals filed for this visit  Ortho Exam    Physical Exam   Left shoulder: Incision CDI  No erythema  Sensation intact left upper extremity  Sling in place  Moves all fingers and wrist freely  2+ radial pulse  I have personally reviewed pertinent films in PACS and my interpretation is as follows:  Xrays left shoulder: Status post left reverse total shoulder replacement  Prosthesis in acceptable position

## 2022-03-25 NOTE — TELEPHONE ENCOUNTER
Patient was order home services upon D/C from hospital by PCP  PCP will get her notes as the referring provider  Since services have already started through that referral they would not be to process a new one to have notes sent to Dr Lazarus Riis  PCP is out of [facility]  We can contact PCP office and request notes from them if we would like to review them

## 2022-03-25 NOTE — PATIENT INSTRUCTIONS
Recommend to hold metoprolol if the systolic blood pressure less than 90 or heart rate less than 60    Heart Failure   AMBULATORY CARE:   Heart failure  is a condition that does not allow your heart to fill or pump properly  Not enough oxygen in your blood gets to your organs and tissues  Fluid may not move through your body properly  Fluid builds up and causes swelling and trouble breathing  This is known as congestive heart failure  Heart failure may start in the left or right ventricle  Heart failure is often caused by damage or injury to your heart  The damage may be caused by other heart problems, diabetes, or high blood pressure  The damage may have also been caused by an infection  Heart failure is a long-term condition that tends to get worse over time  It is important to manage your health to improve your quality of life  Common signs and symptoms:   · Trouble breathing with activity that worsens to trouble breathing at rest    · Shortness of breath while lying flat    · Severe shortness of breath and coughing at night that usually wakes you    · Feeling lightheaded when you stand up    · Purple color around your mouth and nails    · Confusion or anxiety    · Chest pain at night    · Periods of no breathing, then breathing fast    · Lack of energy (often worsened by physical activity), or trouble sleeping    · Swelling in your ankles, legs, or abdomen    · Heartbeat that is fast or not regular    · Fingers and toes feel cool to the touch    Call your local emergency number (911 in the [address] [facility]) if:   · You have any of the following signs of a heart attack:      ? Squeezing, pressure, or pain in your chest    ? You may  also have any of the following:     § Discomfort or pain in your back, neck, jaw, stomach, or arm    § Shortness of breath    § Nausea or vomiting    § Lightheadedness or a sudden cold sweat      Call your doctor if:   · Your heartbeat is fast, slow, or uneven all the time      · You have symptoms of worsening heart failure:      ? Shortness of breath at rest, at night, or that is getting worse in any way    ? Weight gain of 3 or more pounds (1 4 kg) in a day, or more than your healthcare provider says is okay    ? More swelling in your legs or ankles    ? Abdominal pain or swelling    ? More coughing    ? Loss of appetite    ? Feeling tired all the time    · You feel hopeless or depressed, or you have lost interest in things you used to enjoy  · You often feel worried or afraid  · You have questions or concerns about your condition or care  Treatment for heart failure  may include any of the following:  · Medicines  may be needed to help regulate your heart rhythm  You may also need medicines to lower your blood pressure, and to decrease extra fluids  · Oxygen  may help you breathe easier if your oxygen level is lower than normal  A CPAP machine may be used to keep your airway open while you sleep  · Cardiac rehab  is a program run by specialists who will help you safely strengthen your heart  In the program you will learn about exercise, relaxation, stress management, and heart-healthy nutrition  Cardiac rehab may be recommended if your heart failure is not severe  · Surgery  can be done to implant a pacemaker or another device in your chest to regulate your heart rhythm  Other types of surgery can open blocked heart vessels, replace a damaged heart valve, or remove scar tissue  Manage swelling from extra fluid:   · Elevate (raise) your legs above the level of your heart  This will help with fluid that builds up in your legs or ankles  Elevate your legs as often as possible during the day  Prop your legs on pillows or blankets to keep them elevated comfortably  Try not to stand for long periods of time during the day  Move around to keep your blood circulating  · Limit sodium (salt)  Ask how much sodium you can have each day   Your healthcare provider may give you a limit, such as 2,300 milligrams (mg) a day  Your provider or a dietitian can teach you how to read food labels for the number of mg in a food  He or she can also help you find ways to have less salt  For example, if you add salt to food as you cook, do not add more at the table  · Drink liquids as directed  You may need to limit the amount of liquid you drink within 24 hours  Your healthcare provider will tell you how much liquid to have and which liquids are best for you  He or she may tell you to limit liquid to 1 5 to 2 liters in a day  He or she will also tell you how often to drink liquid throughout the day  · Weigh yourself every morning  Use the same scale, in the same spot  Do this after you use the bathroom, but before you eat or drink  Wear the same type of clothing each time  Write down your weight and call your healthcare provider if you have a sudden weight gain  Swelling and weight gain are signs of fluid buildup  Manage heart failure: Your quality of life may improve with treatment and the following:  · Do not smoke  Nicotine and other chemicals in cigarettes and cigars can cause lung and heart damage  Ask your healthcare provider for information if you currently smoke and need help to quit  E-cigarettes or smokeless tobacco still contain nicotine  Talk to your healthcare provider before you use these products  · Do not drink alcohol or use illegal drugs  Alcohol and drugs can increase your risk for high blood pressure, diabetes, and coronary artery disease  · Eat heart-healthy foods  Heart-healthy foods include fruits, vegetables, lean meat (such as beef, chicken, or pork), and low-fat dairy products  Fatty fish such as salmon and tuna are also heart healthy  Other heart-healthy foods include walnuts, whole-grain breads, beans, and cooked beans  Replace butter and margarine with heart-healthy oils such as olive oil or canola oil   Your provider or a dietitian can help you create heart-healthy meal plans  · Manage any chronic health conditions you have  These include high blood pressure, diabetes, obesity, high cholesterol, metabolic syndrome, and COPD  You will have fewer symptoms if you manage these health conditions  Follow your healthcare provider's recommendations and follow up with him or her regularly  · Maintain a healthy weight  Being overweight can increase your risk for high blood pressure, diabetes, and coronary artery disease  These conditions can make your symptoms worse  Ask your healthcare provider how much you should weigh  Ask him or her to help you create a weight loss plan if you are overweight  · Stay active  Activity can help keep your symptoms from getting worse  Walking is a type of physical activity that helps maintain your strength and improve your mood  Physical activity also helps you manage your weight  Work with your healthcare provider to create an exercise plan that is right for you  · Get vaccines as directed  The flu and pneumonia can be severe for a person who has heart failure  Vaccines protect you from these infections  Get a flu shot every year as soon as it is recommended, usually in September or October  You may also need the pneumonia vaccine  Your healthcare provider can tell you if you need other vaccines, and when to get them  Follow up with your doctor or cardiologist within 7 days or as directed: You may need to return for other tests  You may need home health care  A healthcare provider will monitor your vital signs, weight, and make sure your medicines are working  Write down your questions so you remember to ask them during your visits  Join a support group:  Heart failure can be difficult to manage  It may be helpful to talk with others who have heart failure  You may learn how to better manage your condition or get emotional support   For more information:  · American [employer] Association  [address]   Phone: 0- 506 - 224-2061  Web Address: https://www strong [employer]/ [address] [facility] 2022 Information is for End User's use only and may not be sold, redistributed or otherwise used for commercial purposes  All illustrations and images included in CareNotes® are the copyrighted property of A D A M , Inc  or [address] [place], [place] [address]lev   The above information is an  only  It is not intended as medical advice for individual conditions or treatments  Talk to your doctor, nurse or pharmacist before following any medical regimen to see if it is safe and effective for you  Follow with Consultants as per their and our suggestion    Follow up in 2  week(s) or as needed earlier    Follow all instructions as advised and discussed  Take your medications as prescribed  Call the office immediately if you experience any side effects  Ask questions if you do not understand  Keep your scheduled appointment as advised or come sooner if necessary or in doubt  Best time to call for non-urgent matter or questions on weekdays is between 9am and 12 noon  See physician for any new symptoms or worsening of current symptoms  Urgent or emergent situations call 911 and report to nearest emergency room  I spent  70 minutes taking care of this patient including clinical care, conseling, collaboration, chart, lab and consultaion review      Patient is to get labs today

## 2022-03-25 NOTE — PROGRESS NOTES
[facility] Office Visit Note  22     Andreas Garg 73 y o  female MRN: [id number]  : 1950    Assessment:  Discussed with family member at bedside  Patient came in wheelchair  Assisted with the oxygen and transportation  1  Acute diastolic congestive heart failure [facility])  Assessment & Plan:  Wt Readings from Last 3 Encounters:   22 117 kg (259 lb)   22 119 kg (262 lb 12 6 oz)   22 135 kg (297 lb 9 9 oz)         Home weight is in the range of 256-259  Of they are educated about the range of 2532-62  Outside that will sees to let us know    Continue same regimen  Weight torsemide metoprolol diabetes control anti coag      2  Essential hypertension  -     amLODIPine (NORVASC) 5 mg tablet; Take 1 tablet (5 mg total) by mouth daily  -     CBC; Future; Expected date: 2022  -     Comprehensive metabolic panel; Future; Expected date: 2022    3  Mild asthma without complication, unspecified whether persistent  Assessment & Plan:  Patient has asthma since he was a teenager  Last pulmonary function test noticed  Orders:  -     albuterol (PROVENTIL HFA,VENTOLIN HFA) 90 mcg/act inhaler; Inhale 2 puffs every 6 (six) hours as needed for wheezing    4  Type 2 diabetes mellitus with stage 3 chronic kidney disease, with long-term current use of insulin, unspecified whether stage 3a or 3b CKD ([facility])  Assessment & Plan:  Diabetes 40 years, positive history for diabetes in father mother and 1 of the grandparents  Patient is also on insulin  Patient is on Toujeo for 10+ years  currently on 20 units at nighttime only  Check sugar twice a day appetite is fair and normal   Watch his diabetic diet  Yesterday's sugar was in 250-300 range this morning again it was around 250 range  Baseline need for to [facility] was 40 units twice a day in the past before she got sick      The recommend to increase by 5 units and make it 25 units at nighttime  Lab Results   Component Value Date HGBA1C 6 8 (H) 03/09/2022       Orders:  -     CBC; Future; Expected date: 03/25/2022  -     Comprehensive metabolic panel; Future; Expected date: 03/25/2022    5  Paroxysmal atrial fibrillation [facility])  Assessment & Plan:  Paroxysmal atrial fibrillations for almost 2 years  Remains on anticoagulation as well as beta-blocker for the rate control  Recommend to hold metoprolol if the systolic blood pressure less than 90 or heart rate less than 60      6  Acute respiratory failure with hypoxia [facility])  Assessment & Plan:  The acute respiratory failure is better     The sees on oxygen 2 L per minute  Known case of a restrictive pulmonary disease as well as recent CHF      7  Closed fracture of head of left humerus, initial encounter  Assessment & Plan:  Status post fall  Status for fracture  Status post a reverse surgery shoulder  Sling in the place  Being followed by orthopedic  Therapy per them  8  SEBASTIAN (acute kidney injury) ([facility] )  Assessment & Plan:  Improved kidney function      9  Morbid obesity with BMI of 45 0-49 9, adult ([facility] )    10  Stage 3b chronic kidney disease [facility])  Assessment & Plan:  Lab Results   Component Value Date    EGFR 23 03/22/2022    EGFR 22 03/21/2022    EGFR 31 03/20/2022    CREATININE 2 10 (H) 03/22/2022    CREATININE 2 16 (H) 03/21/2022    CREATININE 1 66 (H) 03/20/2022       Orders:  -     CBC; Future; Expected date: 03/25/2022  -     Comprehensive metabolic panel; Future; Expected date: 03/25/2022    11  Mixed hyperlipidemia  Assessment & Plan:  Lab Results   Component Value Date    CHOLESTEROL 104 03/10/2022     Lab Results   Component Value Date    HDL 30 (L) 03/10/2022     Lab Results   Component Value Date    TRIG 132 03/10/2022   Continue rosuvastatin 20 mg daily      12  Lower leg edema  Assessment & Plan:  Some mild chronic edema of the legs baseline    Orders:  -     CBC; Future; Expected date: 03/25/2022  -     Comprehensive metabolic panel;  Future; Expected date: 03/25/2022    13  Anemia, unspecified type  Assessment & Plan:  Anemia multifactorial   Hemoglobin baseline in the 8-9 g  Recently received 2 transfusion  Will keep an close eye on hemoglobin    Orders:  -     CBC; Future; Expected date: 03/25/2022  -     Comprehensive metabolic panel; Future; Expected date: 03/25/2022    14  Constipation, unspecified constipation type  Assessment & Plan:  Constipation is fair a continue same high-fiber diet      15  Raynaud's disease without gangrene        Discussion Summary and Plan: Today's care plan and medications were reviewed with patient in detail and all their questions answered to their satisfaction  Chief Complaint   Patient presents with    Transition of Care Management    Diabetes    Hyperlipidemia    Hypertension    CKD III    Congestive Heart Failure    Asthma    Follow-up      Subjective:         This is the 1st office visit  Patient recently was hospitalized on 03/09/2022  Date of discharge 03/22/2022    Patient with past medical history of chronic diastolic CHF, paroxysmal atrial fibrillation, diabetes, or morbid obesity, hypertension, hyperlipidemia, presented to the emergency room on 03/09/2022 due to chest pain or shortness of breath  Patient was sent from nursing home  Patient was undergoing short-term rehab following left shoulder surgery done by orthopedic  Patient appeared to be in decompensated heart failure and was started on IV Lasix  Patient ended atrial fibrillation with rapid ventricular rate which responded to IV metoprolol  Patient was seen by cardiologist as well as the nephrologist   Renal functions were stable  Lasix drip was started bumped to 2 from baseline in mid 1  Patient was taken of the Lasix patient will be started back on Lasix per Cardiology and Nephrology  Pair    Patient was seen by orthopedic recommended outpatient follow-up  Patient was cleared for [facility]    Physical therapy occupational therapy evaluated patient has  Patient was discharged home  Admission weight 279, discharge weight 262,  Echocardiogram:ECHO performed 3/10 shows EF 41%, systolic function low normal, borderline concentric hypertrophy   Status post thoracentesis with removal of 500 cc on the right and 300 cc on the left, pleural fluid analysis suggests transudative effusions  Most recent CXR-03/20/2022:  shows improved pulmonary vascular congestion   Continue beta-blocker, I/O, daily weights, and encouraged compliance with CHF diet, torsemide   patient discharged on home oxygen per O2 protocol   CTA chest negative for pulmonary embolism      Abnormal CT scan of the chest:   03/09/2022:  CTA chest:  1  Small bilateral pleural effusions and bibasilar atelectasis  Few airspace opacities in the right lung could represent mild aspiration  2   No evidence of acute pulmonary embolus, thoracic aortic aneurysm or dissection    3-:  LUNGS:  Bibasilar atelectasis  Few small airspace opacities scattered throughout the right lung could represent mild aspiration  No pulmonary interstitial edema  There is no tracheal or endobronchial lesion  PLEURA:  Small bilateral pleural effusions   HEART/GREAT VESSELS: Mild cardiomegaly  Aberrant right subclavian artery  No thoracic aortic aneurysm or dissection  LUNGS:  Septal thickening with scattered alveolar nodularity due to mild alveolar and interstitial edema  Resolving nodular airspace opacity in the superior segment right lower lobe  Mild dependent atelectasis in both lower lobes  IIRWAYS: No significant filling defects  PLEURA:  Stable moderate effusions   HEART/GREAT VESSELS:  Normal heart size  Mild coronary artery calcification indicating atherosclerotic heart disease    MEDIASTINUM AND LUIGI:  Unremarkable    IMPRESSION:   Nothing to suggest pneumonia    Septal thickening and alveolar nodules due to interstitial and alveolar edema with persistent moderate effusions    Acute respiratory failure with hypoxia:  Resolved      CKD 3:  Superimposed SEBASTIAN:  Baseline creatinine 1 4-1 6  Creatinine spike to 2 6, patient had a contrast on 03/09, discharge creatinine 2 1 on 03/21/2021    Diabetes:  Hemoglobin A1c 6 8  Blood sugars continued remained uncontrolled  HA1C indicated good control  ·Continue Lantus 36 units daily  ·Continue Humalog sliding scale    ·  Status post reverse total replacement of left shoulder  S/p fall resulting in Comminuted displaced fracture of the proximal humerus (2/23/22)  ·S/p left shoulder reverse complete arthroplasty (3/1/22)   ·LUE venous duplex is negative for DVT  ·PT/OT consults appreciated; recommending home with therapy    Paroxysmal atrial fibrillation with rapid ventricular rate:  Patient was treated with IV Lopressor responded is transitioned to Lopressor 50 mg twice a day  Cleared by orthopedic for Eliquis which was started  DEMETRIS vascular score 3    Anemia remains stable around 8 g hemoglobin  Hypertension control with metoprolol 50 mg twice a day      Hyperlipidemia patient being managed with rosuvastatin 20 mg daily  BMI 48 for diet  Sleep apnea:  Refuse CPAP  Lung nodule:  03/08/2021:  CT scan of the chest compared with previous CT scan of the 09/24/2020:1  Multiple small bilateral lung nodules appears stable compared to 9/24/2020  No suspicious enlarging nodule is identified  Restrictive pulmonary disease:  PFT:  11/09/2020:Conclusions:   A restrictive ventilatory defect of extra-parenchymal type with normal   diffusing capacity is present   Obesity, chest wall disorders,   neuro-muscular disorders and pleural disorders are to be considered  Patient is currently on 2 L oxygen 24 hours a day  No prior history of home oxygen use    Patient in between the hospitalization had urinary tract infection as well as hypokalemia      Patient did receive blood transfusion x2 day hospitalization or at least 1 of this hospitalization      WBC Value: 6 02(Thousand/uL)  Dt: 03/08/2022  RBC                       Value: 3 11(Million/uL)*  Dt: 03/08/2022  Hemoglobin                Value: 8 8(g/dL)*         Dt: 03/08/2022  Hematocrit                Value: 29 9(%)*           Dt: 03/08/2022  MCV                       Value: 96(fL)             Dt: 03/08/2022  MCH                       Value: 28 3(pg)           Dt: 03/08/2022  MCHC                      Value: 29 4(g/dL)*        Dt: 03/08/2022  RDW                       Value: 15 8(%)*           Dt: 03/08/2022  MPV                       Value: 9 8(fL)            Dt: 03/08/2022  Platelets                 Value: 331(Thousands/uL)  Dt: 03/08/2022  nRBC                      Value: 0(/100 WBCs)       Dt: 03/08/2022  Neutrophils Relative      Value: 58(%)              Dt: 03/08/2022  Immat GRANS %             Value: 1(%)               Dt: 03/08/2022  Lymphocytes Relative      Value: 21(%)              Dt: 03/08/2022  Monocytes Relative        Value: 15(%)*             Dt: 03/08/2022  Eosinophils Relative      Value: 4(%)               Dt: 03/08/2022  Basophils Relative        Value: 1(%)               Dt: 03/08/2022  Neutrophils Absolute      Value: 3 51(Thousands/µL) Dt: 03/08/2022  Immature Grans Absolute   Value: 0 03(Thousand/uL)  Dt: 03/08/2022  Lymphocytes Absolute      Value: 1 28(Thousands/µL) Dt: 03/08/2022  Monocytes Absolute        Value: 0 93(Thousand/µL)  Dt: 03/08/2022  Eosinophils Absolute      Value: 0 21(Thousand/µL)  Dt: 03/08/2022  Basophils Absolute        Value: 0 06(Thousands/µL) Dt: 03/08/2022  Protime                   Value: 16  4(seconds)*     Dt: 03/08/2022  INR                       Value: 1 36*              Dt: 03/08/2022  PTT                       Value: 41(seconds)*       Dt: 03/08/2022  Sodium                    Value: 145(mmol/L)        Dt: 03/08/2022  Potassium                 Value: 4 6(mmol/L)        Dt: 03/08/2022  Chloride                  Value: 107(mmol/L)        Dt: 03/08/2022  CO2 Value: 27(mmol/L)         Dt: 03/08/2022  ANION GAP                 Value: 11(mmol/L)         Dt: 03/08/2022  BUN                       Value: 37(mg/dL)*         Dt: 03/08/2022  Creatinine                Value: 1 24(mg/dL)        Dt: 03/08/2022  Glucose                   Value: 122(mg/dL)         Dt: 03/08/2022  Calcium                   Value: 8 2(mg/dL)*        Dt: 03/08/2022  Corrected Calcium         Value: 9 6(mg/dL)         Dt: 03/08/2022  AST                       Value: 22(U/L)            Dt: 03/08/2022  ALT                       Value: 18(U/L)            Dt: 03/08/2022  Alkaline Phosphatase      Value: 83(U/L)            Dt: 03/08/2022  Total Protein             Value: 6 2(g/dL)*         Dt: 03/08/2022  Albumin                   Value: 2 3(g/dL)*         Dt: 03/08/2022  Total Bilirubin           Value: 0 52(mg/dL)        Dt: 03/08/2022  eGFR                      Value: 44(ml/min/1 73sq m) Dt: 03/08/2022  hs TnI 0hr                Value: 7(ng/L)            Dt: 03/08/2022  Magnesium                 Value: 2 5(mg/dL)         Dt: 03/08/2022  hs TnI 2hr                Value:  7(ng/L)            Dt: 03/08/2022  Delta 2hr hsTnI           Value: 0(ng/L)            Dt: 03/08/2022  Color, UA                 Value: Yellow             Dt: 03/08/2022  Clarity, UA                                         Dt: 03/08/2022                  Value: Slightly Cloudy   Specific Gravity, UA      Value: 1 020              Dt: 03/08/2022  pH, UA                    Value: 6 0                Dt: 03/08/2022  Leukocytes, UA            Value: Negative           Dt: 03/08/2022  Nitrite, UA               Value: Negative           Dt: 03/08/2022  Protein, UA               Value: 30 (1+)(mg/dl)*    Dt: 03/08/2022  Glucose, UA               Value: Negative(mg/dl)    Dt: 03/08/2022  Ketones, UA               Value: Trace(mg/dl)*      Dt: 03/08/2022  Urobilinogen, UA          Value: 0 2(E U /dl)       Dt: 03/08/2022  Bilirubin, UA             Value: Negative           Dt: 03/08/2022  Blood, UA                 Value: Trace-lysed*       Dt: 03/08/2022  RBC, UA                   Value: 0-1(/hpf)          Dt: 03/08/2022  WBC, UA                   Value: 2-4(/hpf)          Dt: 03/08/2022  Epithelial Cells          Value: Moderate(/hpf)*    Dt: 03/08/2022  Bacteria, UA              Value:  Moderate(/hpf)*    Dt: 03/08/2022  Hyaline Casts, UA         Value: 1-2(/lpf)*         Dt: 03/08/2022  MUCUS THREADS             Value: Occasional*        Dt: 03/08/2022  POC Glucose               Value: 135(mg/dl)         Dt: 03/09/2022  Ventricular Rate          Value: 83(BPM)            Dt: 03/08/2022  Atrial Rate               Value: 83(BPM)            Dt: 03/08/2022  NV Interval               Value: 168(ms)            Dt: 03/08/2022  QRSD Interval             Value: 72(ms)             Dt: 03/08/2022  QT Interval               Value: 382(ms)            Dt: 03/08/2022  QTC Interval              Value: 448(ms)            Dt: 03/08/2022  P Axis                    Value: 40(degrees)        Dt: 03/08/2022  QRS Axis                  Value: -10(degrees)       Dt: 03/08/2022  T Wave Axis               Value: 17(degrees)        Dt: 03/08/2022  Ventricular Rate          Value: 106(BPM)           Dt: 03/08/2022  QRSD Interval             Value: 72(ms)             Dt: 03/08/2022  QT Interval               Value: 310(ms)            Dt: 03/08/2022  QTC Interval              Value: 411(ms)            Dt: 03/08/2022  QRS Axis                  Value: -5(degrees)        Dt: 03/08/2022  T Wave Axis               Value: 12(degrees)        Dt: 03/08/2022  Ventricular Rate          Value: 127(BPM)           Dt: 03/08/2022  Atrial Rate               Value: 127(BPM)           Dt: 03/08/2022  QRSD Interval             Value: 56(ms)             Dt: 03/08/2022  QT Interval               Value: 314(ms)            Dt: 03/08/2022  QTC Interval              Value: 456(ms)            Dt: 03/08/2022  QRS Axis                  Value: -2(degrees)        Dt: 03/08/2022  T Wave Axis               Value: 54(degrees)        Dt: 03/08/2022  ------------  Admission on 03/01/2022, Discharged on 03/07/2022  No results displayed because visit has over 200 results      ------------  Admission on 02/16/2022, Discharged on 02/20/2022  Sodium                    Value: 142(mmol/L)        Dt: 02/16/2022  Potassium                 Value: 4 8(mmol/L)        Dt: 02/16/2022  Chloride                  Value: 103(mmol/L)        Dt: 02/16/2022  CO2                       Value: 29(mmol/L)         Dt: 02/16/2022  ANION GAP                 Value: 10(mmol/L)         Dt: 02/16/2022  BUN                       Value: 39(mg/dL)*         Dt: 02/16/2022  Creatinine                Value: 1 52(mg/dL)*       Dt: 02/16/2022  Glucose                   Value: 305(mg/dL)*        Dt: 02/16/2022  Calcium                   Value: 8 1(mg/dL)*        Dt: 02/16/2022  Corrected Calcium         Value: 8 7(mg/dL)         Dt: 02/16/2022  AST                       Value: 17(U/L)            Dt: 02/16/2022  ALT                       Value: 27(U/L)            Dt: 02/16/2022  Alkaline Phosphatase      Value: 74(U/L)            Dt: 02/16/2022  Total Protein             Value: 6 9(g/dL)          Dt: 02/16/2022  Albumin                   Value: 3 2(g/dL)*         Dt: 02/16/2022  Total Bilirubin           Value: 0 38(mg/dL)        Dt: 02/16/2022  eGFR                      Value: 34(ml/min/1 73sq m) Dt: 02/16/2022  WBC                       Value: 11 92(Thousand/uL)*Dt: 02/16/2022  RBC                       Value: 4 00(Million/uL)   Dt: 02/16/2022  Hemoglobin                Value: 11 7(g/dL)         Dt: 02/16/2022  Hematocrit                Value: 36 7(%)            Dt: 02/16/2022  MCV                       Value: 92(fL)             Dt: 02/16/2022  MCH                       Value: 29 3(pg)           Dt: 02/16/2022  MCHC Value: 31 9(g/dL)         Dt: 02/16/2022  RDW                       Value: 15 1(%)            Dt: 02/16/2022  MPV                       Value: 11 9(fL)           Dt: 02/16/2022  Platelets                 Value: 188(Thousands/uL)  Dt: 02/16/2022  nRBC                      Value: 0(/100 WBCs)       Dt: 02/16/2022  Neutrophils Relative      Value: 68(%)              Dt: 02/16/2022  Immat GRANS %             Value: 1(%)               Dt: 02/16/2022  Lymphocytes Relative      Value: 21(%)              Dt: 02/16/2022  Monocytes Relative        Value: 8(%)               Dt: 02/16/2022  Eosinophils Relative      Value: 1(%)               Dt: 02/16/2022  Basophils Relative        Value: 1(%)               Dt: 02/16/2022  Neutrophils Absolute      Value: 8 06(Thousands/µL)*Dt: 02/16/2022  Immature Grans Absolute   Value: 0 09(Thousand/uL)  Dt: 02/16/2022  Lymphocytes Absolute      Value: 2 50(Thousands/µL) Dt: 02/16/2022  Monocytes Absolute        Value: 0 99(Thousand/µL)  Dt: 02/16/2022  Eosinophils Absolute      Value: 0 16(Thousand/µL)  Dt: 02/16/2022  Basophils Absolute        Value: 0 12(Thousands/µL)*Dt: 02/16/2022  Protime                   Value: 14  3(seconds)      Dt: 02/16/2022  INR                       Value: 1 13               Dt: 02/16/2022  PTT                       Value: 31(seconds)        Dt: 02/16/2022  POC Glucose               Value: 442(mg/dl)*        Dt: 02/17/2022  POC Glucose               Value: 469(mg/dl)*        Dt: 02/17/2022  POC Glucose               Value: 430(mg/dl)*        Dt: 02/17/2022  POC Glucose               Value: 331(mg/dl)*        Dt: 02/17/2022  WBC                       Value: 10 85(Thousand/uL)*Dt: 02/18/2022  RBC                       Value: 2 93(Million/uL)*  Dt: 02/18/2022  Hemoglobin                Value: 8 5(g/dL)*         Dt: 02/18/2022  Hematocrit                Value: 27 6(%)*           Dt: 02/18/2022  MCV                       Value: 94(fL)             Dt: 02/18/2022  MCH                       Value: 29 0(pg)           Dt: 02/18/2022  MCHC                      Value: 30 8(g/dL)*        Dt: 02/18/2022  RDW                       Value: 15 7(%)*           Dt: 02/18/2022  MPV                       Value: 11 4(fL)           Dt: 02/18/2022  Platelets                 Value: 168(Thousands/uL)  Dt: 02/18/2022  nRBC                      Value: 0(/100 WBCs)       Dt: 02/18/2022  Neutrophils Relative      Value: 62(%)              Dt: 02/18/2022  Immat GRANS %             Value: 1(%)               Dt: 02/18/2022  Lymphocytes Relative      Value: 25(%)              Dt: 02/18/2022  Monocytes Relative        Value: 10(%)              Dt: 02/18/2022  Eosinophils Relative      Value: 1(%)               Dt: 02/18/2022  Basophils Relative        Value: 1(%)               Dt: 02/18/2022  Neutrophils Absolute      Value: 6 81(Thousands/µL) Dt: 02/18/2022  Immature Grans Absolute   Value: 0 08(Thousand/uL)  Dt: 02/18/2022  Lymphocytes Absolute      Value: 2 73(Thousands/µL) Dt: 02/18/2022  Monocytes Absolute        Value: 1 07(Thousand/µL)  Dt: 02/18/2022  Eosinophils Absolute      Value: 0 08(Thousand/µL)  Dt: 02/18/2022  Basophils Absolute        Value: 0 08(Thousands/µL) Dt: 02/18/2022  Sodium                    Value: 136(mmol/L)        Dt: 02/18/2022  Potassium                 Value: 5 7(mmol/L)*       Dt: 02/18/2022  Chloride                  Value: 102(mmol/L)        Dt: 02/18/2022  CO2                       Value: 25(mmol/L)         Dt: 02/18/2022  ANION GAP                 Value:  9(mmol/L)          Dt: 02/18/2022  BUN                       Value: 65(mg/dL)*         Dt: 02/18/2022  Creatinine                Value: 3 32(mg/dL)*       Dt: 02/18/2022  Glucose                   Value: 115(mg/dL)         Dt: 02/18/2022  Calcium                   Value: 7 5(mg/dL)*        Dt: 02/18/2022  eGFR                      Value: 13(ml/min/1 73sq m) Dt: 02/18/2022  POC Glucose               Value: 117(mg/dl)         Dt: 02/18/2022  POC Glucose               Value: 165(mg/dl)*        Dt: 02/18/2022  POC Glucose               Value: 304(mg/dl)*        Dt: 02/18/2022  POC Glucose               Value: 241(mg/dl)*        Dt: 02/18/2022  WBC                       Value: 9 79(Thousand/uL)  Dt: 02/19/2022  RBC                       Value: 2 68(Million/uL)*  Dt: 02/19/2022  Hemoglobin                Value: 7 9(g/dL)*         Dt: 02/19/2022  Hematocrit                Value: 24 7(%)*           Dt: 02/19/2022  MCV                       Value: 92(fL)             Dt: 02/19/2022  MCH                       Value: 29 5(pg)           Dt: 02/19/2022  MCHC                      Value: 32 0(g/dL)         Dt: 02/19/2022  RDW                       Value: 15 5(%)*           Dt: 02/19/2022  MPV                       Value: 11 4(fL)           Dt: 02/19/2022  Platelets                 Value: 167(Thousands/uL)  Dt: 02/19/2022  nRBC                      Value: 0(/100 WBCs)       Dt: 02/19/2022  Neutrophils Relative      Value: 58(%)              Dt: 02/19/2022  Immat GRANS %             Value: 1(%)               Dt: 02/19/2022  Lymphocytes Relative      Value: 28(%)              Dt: 02/19/2022  Monocytes Relative        Value: 10(%)              Dt: 02/19/2022  Eosinophils Relative      Value: 2(%)               Dt: 02/19/2022  Basophils Relative        Value: 1(%)               Dt: 02/19/2022  Neutrophils Absolute      Value: 5 75(Thousands/µL) Dt: 02/19/2022  Immature Grans Absolute   Value: 0 10(Thousand/uL)  Dt: 02/19/2022  Lymphocytes Absolute      Value: 2 73(Thousands/µL) Dt: 02/19/2022  Monocytes Absolute        Value: 0 93(Thousand/µL)  Dt: 02/19/2022  Eosinophils Absolute      Value: 0 18(Thousand/µL)  Dt: 02/19/2022  Basophils Absolute        Value: 0 10(Thousands/µL) Dt: 02/19/2022  Sodium                    Value: 139(mmol/L)        Dt: 02/19/2022  Potassium                 Value: 5 2(mmol/L)        Dt: 02/19/2022  Chloride Value: 104(mmol/L)        Dt: 02/19/2022  CO2                       Value: 28(mmol/L)         Dt: 02/19/2022  ANION GAP                 Value: 7(mmol/L)          Dt: 02/19/2022  BUN                       Value: 69(mg/dL)*         Dt: 02/19/2022  Creatinine                Value: 2 47(mg/dL)*       Dt: 02/19/2022  Glucose                   Value: 119(mg/dL)         Dt: 02/19/2022  Calcium                   Value: 6 8(mg/dL)*        Dt: 02/19/2022  eGFR                      Value: 19(ml/min/1 73sq m) Dt: 02/19/2022  POC Glucose               Value: 89(mg/dl)          Dt: 02/19/2022  POC Glucose               Value: 134(mg/dl)         Dt: 02/19/2022  POC Glucose               Value: 299(mg/dl)*        Dt: 02/19/2022  POC Glucose               Value: 426(mg/dl)*        Dt: 02/19/2022  Sodium                    Value: 141(mmol/L)        Dt: 02/20/2022  Potassium                 Value: 4 6(mmol/L)        Dt: 02/20/2022  Chloride                  Value: 108(mmol/L)        Dt: 02/20/2022  CO2                       Value: 26(mmol/L)         Dt: 02/20/2022  ANION GAP                 Value:  7(mmol/L)          Dt: 02/20/2022  BUN                       Value: 54(mg/dL)*         Dt: 02/20/2022  Creatinine                Value: 1 68(mg/dL)*       Dt: 02/20/2022  Glucose                   Value: 110(mg/dL)         Dt: 02/20/2022  Calcium                   Value: 7 0(mg/dL)*        Dt: 02/20/2022  eGFR                      Value: 30(ml/min/1 73sq m) Dt: 02/20/2022  POC Glucose               Value: 114(mg/dl)         Dt: 02/20/2022  POC Glucose               Value: 181(mg/dl)*        Dt: 02/20/2022  POC Glucose               Value: 270(mg/dl)*        Dt: 02/20/2022  ------------  Office Visit on 12/28/2021  SARS-CoV-2                Value: Positive*          Dt: 12/28/2021  INFLUENZA A PCR           Value: Negative           Dt: 12/28/2021  INFLUENZA B PCR           Value: Negative           Dt: 12/28/2021  ------------ - 6 moths labs          The following portions of the patient's history were reviewed and updated as appropriate: allergies, current medications, past family history, past medical history, past social history, past surgical history and problem list     Review of Systems   All other systems reviewed and are negative          Historical Information   Patient Active Problem List   Diagnosis    Asthma    Morbid obesity with BMI of 45 0-49 9, adult (HCC)    Lower leg edema    Paroxysmal atrial fibrillation (HCC)    Mixed hyperlipidemia    Anemia    Essential hypertension    Humeral head fracture    PONV (postoperative nausea and vomiting)    [facility]STIAN [facility]acute kidney injury) ([facility] )    Diabetes mellitus, type 2 (HCC)    Gastroesophageal reflux disease    Nephrolithiasis    Arthritis    S/P total knee replacement, right    On continuous oral anticoagulation - eliquis    Closed 4-part fracture of proximal humerus, left, initial encounter    Constipation    Shortness of breath    Status post reverse total replacement of left shoulder    Acute diastolic congestive heart failure (HCC)    Acute respiratory failure with hypoxia (HCC)    Stage 3 chronic kidney disease (HCC)    Peripheral venous insufficiency    Post-herpetic polyneuropathy    Raynaud's disease    Solitary pulmonary nodule     Past Medical History:   Diagnosis Date    A-fib ([facility] )     Anemia     Asthma     Chronic kidney disease     CKD (chronic kidney disease)     Diabetes mellitus (HCC)     GERD (gastroesophageal reflux disease)     Hyperlipidemia     Kidney stones     PONV (postoperative nausea and vomiting)     SVT (supraventricular tachycardia) (HCC)      Past Surgical History:   Procedure Laterality Date    APPENDECTOMY      CYSTOSCOPY W/ LASER LITHOTRIPSY Left 7/12/2016    Procedure: CYSTOSCOPY URETEROSCOPY WITH LITHOTRIPSY HOLMIUM LASER, RETROGRADE PYELOGRAM AND INSERTION STENT URETERAL;  Surgeon: Alize Brumfield MD; Location: [facility];  Service:     DILATION AND CURETTAGE OF UTERUS      IR THORACENTESIS  3/18/2022    JOINT REPLACEMENT      right knee    KNEE ARTHROPLASTY Right     LA CYSTOURETHROSCOPY,URETER CATHETER Left 2016    Procedure: CYSTOSCOPY RETROGRADE PYELOGRAM WITH INSERTION STENT URETERAL, left;  Surgeon: Xenia Miller MD;  Location: [facility];  Service: Urology    LA RECONSTR TOTAL SHOULDER IMPLANT Left 3/1/2022    Procedure: ARTHROPLASTY SHOULDER REVERSE;  Surgeon: Nelly Garcia MD;  Location: [facility];  Service: Orthopedics    SHOULDER ARTHROTOMY Left      Social History     Substance and Sexual Activity   Alcohol Use Not Currently    Comment: rarely     Social History     Substance and Sexual Activity   Drug Use No     Social History     Tobacco Use   Smoking Status Former Smoker    Packs/day:     Years: 20     Pack years: 20     Types: Cigarettes    Quit date: 1996    Years since quittin 7   Smokeless Tobacco Never Used     Family History   Problem Relation Age of Onset    Heart disease Mother     Emphysema Maternal Grandmother     Heart disease Family      Health Maintenance Due   Topic    Hepatitis C Screening     Medicare Annual Wellness Visit (AWV)     Pneumococcal Vaccine: 65+ Years (1 of 4 - PCV13)    Diabetic Foot Exam     DM Eye Exam     BMI: Followup Plan     DTaP,Tdap,and Td Vaccines (1 - Tdap)    Breast Cancer Screening: Mammogram     Osteoporosis Screening     Fall Risk     URINE MICROALBUMIN     Influenza Vaccine (1)    COVID-19 Vaccine (2 - Moderna 3-dose series)      Meds/Allergies       Current Outpatient Medications:     acetaminophen (TYLENOL) 325 mg tablet, Take 650 mg by mouth every 6 (six) hours as needed for mild pain  , Disp: , Rfl:     albuterol (PROVENTIL HFA,VENTOLIN HFA) 90 mcg/act inhaler, Inhale 2 puffs every 6 (six) hours as needed for wheezing, Disp: 8 g, Rfl: 1    amLODIPine (NORVASC) 5 mg tablet, Take 1 tablet (5 mg total) by mouth daily, Disp: 30 tablet, Rfl: 1    ammonium lactate (LAC-HYDRIN) 12 % lotion, APPLY TOPICALLY TO AFFECTED AREAS twice a day, Disp: , Rfl:     apixaban (ELIQUIS) 5 mg, Take 1 tablet (5 mg total) by mouth 2 (two) times a day, Disp: 60 tablet, Rfl: 0    desloratadine (CLARINEX) 5 MG tablet, Take by mouth daily  , Disp: , Rfl:     docusate sodium (COLACE) 100 mg capsule, Take 100 mg by mouth in the morning, Disp: , Rfl:     lidocaine (LIDODERM) 5 %, Apply 2 patches topically daily Remove & Discard patch within 12 hours or as directed by MD, Disp: , Rfl: 0    montelukast (SINGULAIR) 10 mg tablet, Take 10 mg by mouth daily  , Disp: , Rfl:     NOVOFINE AUTOCOVER 30G X 8 MM MISC, , Disp: , Rfl: 0    nystatin (MYCOSTATIN) powder, Apply topically 2 (two) times a day, Disp: , Rfl:     pregabalin (LYRICA) 100 mg capsule, Take 100 mg by mouth 2 (two) times a day , Disp: , Rfl:     rosuvastatin (CRESTOR) 10 MG tablet, 10 mg daily  , Disp: , Rfl: 0    torsemide (DEMADEX) 10 mg tablet, Take 1 tablet (10 mg total) by mouth daily, Disp: 30 tablet, Rfl: 0    Toujeo SoloStar 300 units/mL CONCENTRATED U-300 injection pen (1-unit dial), Inject 20 Units under the skin daily at bedtime  , Disp: , Rfl:     Trulicity 1 5 RB/7 6MA SOPN, inject 0 5 milliliter subcutaneously every week 28, Disp: , Rfl:     metoprolol tartrate (LOPRESSOR) 50 mg tablet, Take 1 tablet (50 mg total) by mouth every 12 (twelve) hours, Disp: 60 tablet, Rfl: 0      Objective:    Vitals:   Pulse 59   Ht 5' 2" (1 575 m)   Wt 117 kg (259 lb)   SpO2 97%   BMI 47 37 kg/m²   Body mass index is 47 37 kg/m²  Vitals:    03/25/22 1313   Weight: 117 kg (259 lb)       Physical Exam  Vitals reviewed  Exam conducted with a chaperone present  Constitutional:       General: She is not in acute distress  Appearance: She is well-developed  She is obese  She is not ill-appearing, toxic-appearing or diaphoretic     HENT:      Head: Normocephalic and atraumatic  Right Ear: External ear normal       Left Ear: External ear normal       Nose: No congestion or rhinorrhea  Eyes:      General: Lids are normal          Right eye: No discharge  Left eye: No discharge  Conjunctiva/sclera: Conjunctivae normal    Neck:      Thyroid: No thyroid mass or thyromegaly  Vascular: No carotid bruit or JVD  Trachea: Trachea normal    Cardiovascular:      Rate and Rhythm: Regular rhythm  Heart sounds: Normal heart sounds  No murmur heard  Pulmonary:      Effort: No respiratory distress  Breath sounds: Normal breath sounds  No wheezing, rhonchi or rales  Abdominal:      General: Bowel sounds are normal  There is no distension  Palpations: There is no mass  Tenderness: There is no abdominal tenderness  There is no rebound  Musculoskeletal:      Right lower leg: Edema present  Left lower leg: Edema present  Lymphadenopathy:      Cervical: No cervical adenopathy  Skin:     General: Skin is warm  Coloration: Skin is not jaundiced or pale  Findings: No rash  Neurological:      Mental Status: She is alert  Coordination: Coordination normal       Gait: Gait abnormal    Psychiatric:         Mood and Affect: Mood normal          Behavior: Behavior normal          Thought Content: Thought content normal          Judgment: Judgment normal         Patient is on nasal oxygen  Lab Review   No results displayed because visit has over 200 results        Admission on 03/08/2022, Discharged on 03/09/2022   Component Date Value Ref Range Status    WBC 03/08/2022 6 02  4 31 - 10 16 Thousand/uL Final    RBC 03/08/2022 3 11* 3 81 - 5 12 Million/uL Final    Hemoglobin 03/08/2022 8 8* 11 5 - 15 4 g/dL Final    Hematocrit 03/08/2022 29 9* 34 8 - 46 1 % Final    MCV 03/08/2022 96  82 - 98 fL Final    MCH 03/08/2022 28 3  26 8 - 34 3 pg Final    MCHC 03/08/2022 29 4* 31 4 - 37 4 g/dL Final    RDW 03/08/2022 15 8* 11 6 - 15 1 % Final    MPV 03/08/2022 9 8  8 9 - 12 7 fL Final    Platelets 55/19/6509 331  149 - 390 Thousands/uL Final    nRBC 03/08/2022 0  /100 WBCs Final    Neutrophils Relative 03/08/2022 58  43 - 75 % Final    Immat GRANS % 03/08/2022 1  0 - 2 % Final    Lymphocytes Relative 03/08/2022 21  14 - 44 % Final    Monocytes Relative 03/08/2022 15* 4 - 12 % Final    Eosinophils Relative 03/08/2022 4  0 - 6 % Final    Basophils Relative 03/08/2022 1  0 - 1 % Final    Neutrophils Absolute 03/08/2022 3 51  1 85 - 7 62 Thousands/µL Final    Immature Grans Absolute 03/08/2022 0 03  0 00 - 0 20 Thousand/uL Final    Lymphocytes Absolute 03/08/2022 1 28  0 60 - 4 47 Thousands/µL Final    Monocytes Absolute 03/08/2022 0 93  0 17 - 1 22 Thousand/µL Final    Eosinophils Absolute 03/08/2022 0 21  0 00 - 0 61 Thousand/µL Final    Basophils Absolute 03/08/2022 0 06  0 00 - 0 10 Thousands/µL Final    Protime 03/08/2022 16 4* 11 6 - 14 5 seconds Final    INR 03/08/2022 1 36* 0 84 - 1 19 Final    PTT 03/08/2022 41* 23 - 37 seconds Final    Therapeutic Heparin Range =  60-90 seconds    Sodium 03/08/2022 145  136 - 145 mmol/L Final    Potassium 03/08/2022 4 6  3 5 - 5 3 mmol/L Final    Chloride 03/08/2022 107  100 - 108 mmol/L Final    CO2 03/08/2022 27  21 - 32 mmol/L Final    ANION GAP 03/08/2022 11  4 - 13 mmol/L Final    BUN 03/08/2022 37* 5 - 25 mg/dL Final    Creatinine 03/08/2022 1 24  0 60 - 1 30 mg/dL Final    Standardized to IDMS reference method    Glucose 03/08/2022 122  65 - 140 mg/dL Final    If the patient is fasting, the ADA then defines impaired fasting glucose as > 100 mg/dL and diabetes as > or equal to 123 mg/dL  Specimen collection should occur prior to Sulfasalazine administration due to the potential for falsely depressed results  Specimen collection should occur prior to Sulfapyridine administration due to the potential for falsely elevated results      Calcium 03/08/2022 8 2* 8 3 - 10 1 mg/dL Final    Corrected Calcium 03/08/2022 9 6  8 3 - 10 1 mg/dL Final    AST 03/08/2022 22  5 - 45 U/L Final    Specimen collection should occur prior to Sulfasalazine administration due to the potential for falsely depressed results   ALT 03/08/2022 18  12 - 78 U/L Final    Specimen collection should occur prior to Sulfasalazine administration due to the potential for falsely depressed results   Alkaline Phosphatase 03/08/2022 83  46 - 116 U/L Final    Total Protein 03/08/2022 6 2* 6 4 - 8 2 g/dL Final    Albumin 03/08/2022 2 3* 3 5 - 5 0 g/dL Final    Total Bilirubin 03/08/2022 0 52  0 20 - 1 00 mg/dL Final    Use of this assay is not recommended for patients undergoing treatment with eltrombopag due to the potential for falsely elevated results   eGFR 03/08/2022 44  ml/min/1 73sq m Final    hs TnI 0hr 03/08/2022 7  "Refer to ACS Flowchart"- see link ng/L Final    Comment:                                              Initial (time 0) result  If >=50 ng/L, Myocardial injury suggested ;  Type of myocardial injury and treatment strategy  to be determined  If 5-49 ng/L, a delta result at 2 hours and or 4 hours will be needed to further evaluate  If <4 ng/L, and chest pain has been >3 hours since onset, patient may qualify for discharge based on the HEART score in the ED  If <5 ng/L and <3hours since onset of chest pain, a delta result at 2 hours will be needed to further evaluate  HS Troponin 99th Percentile URL of a Health Population=12 ng/L with a 95% Confidence Interval of 8-18 ng/L  Second Troponin (time 2 hours)  If calculated delta >= 20 ng/L,  Myocardial injury suggested ; Type of myocardial injury and treatment strategy to be determined  If 5-49 ng/L and the calculated delta is 5-19 ng/L, consult medical service for evaluation  Continue evaluation for ischemia on ecg and other possible etiology and repeat hs troponin at 4 hours    If delta is <5 ng/L at 2 hours, consider discharge based on risk stratification via the HEART score (if in ED), or VASILE risk score in IP/Observation  HS Troponin 99th Percentile URL of a Health Population=12 ng/L with a 95% Confidence Interval of 8-18 ng/L   Magnesium 03/08/2022 2 5  1 6 - 2 6 mg/dL Final    hs TnI 2hr 03/08/2022 7  "Refer to ACS Flowchart"- see link ng/L Final    Comment:                                              Initial (time 0) result  If >=50 ng/L, Myocardial injury suggested ;  Type of myocardial injury and treatment strategy  to be determined  If 5-49 ng/L, a delta result at 2 hours and or 4 hours will be needed to further evaluate  If <4 ng/L, and chest pain has been >3 hours since onset, patient may qualify for discharge based on the HEART score in the ED  If <5 ng/L and <3hours since onset of chest pain, a delta result at 2 hours will be needed to further evaluate  HS Troponin 99th Percentile URL of a Health Population=12 ng/L with a 95% Confidence Interval of 8-18 ng/L  Second Troponin (time 2 hours)  If calculated delta >= 20 ng/L,  Myocardial injury suggested ; Type of myocardial injury and treatment strategy to be determined  If 5-49 ng/L and the calculated delta is 5-19 ng/L, consult medical service for evaluation  Continue evaluation for ischemia on ecg and other possible etiology and repeat hs troponin at 4 hours  If delta                            is <5 ng/L at 2 hours, consider discharge based on risk stratification via the HEART score (if in ED), or VASILE risk score in IP/Observation  HS Troponin 99th Percentile URL of a Health Population=12 ng/L with a 95% Confidence Interval of 8-18 ng/L      Delta 2hr hsTnI 03/08/2022 0  <20 ng/L Final    Color, UA 03/08/2022 Yellow   Final    Clarity, UA 03/08/2022 Slightly Cloudy   Final    Specific [place], UA 03/08/2022 1 020  1 000 - 1 030 Final    pH, UA 03/08/2022 6 0  5 0, 5 5, 6 0, 6 5, 7 0, 7 5, 8 0, 8  5, 9 0 Final    Leukocytes, UA 03/08/2022 Negative  Negative Final    Nitrite, UA 03/08/2022 Negative  Negative Final    Protein, UA 03/08/2022 30 (1+)* Negative mg/dl Final    Glucose, UA 03/08/2022 Negative  Negative mg/dl Final    Ketones, UA 03/08/2022 Trace* Negative mg/dl Final    Urobilinogen, UA 03/08/2022 0 2  0 2, 1 0 E U /dl E U /dl Final    Bilirubin, UA 03/08/2022 Negative  Negative Final    Blood, UA 03/08/2022 Trace-lysed* Negative Final    RBC, UA 03/08/2022 0-1  None Seen, 0-1, 1-2, 2-4, 0-5 /hpf Final    WBC, UA 03/08/2022 2-4  None Seen, 0-1, 1-2, 0-5, 2-4 /hpf Final    Epithelial Cells 03/08/2022 Moderate* None Seen, Occasional /hpf Final    Bacteria, UA 03/08/2022 Moderate* None Seen, Occasional /hpf Final    Hyaline Casts, UA 03/08/2022 1-2* (none) /lpf Final    MUCUS THREADS 03/08/2022 Occasional* None Seen Final    POC Glucose 03/09/2022 135  65 - 140 mg/dl Final    Ventricular Rate 03/08/2022 83  BPM Final    Atrial Rate 03/08/2022 83  BPM Final    [place] Interval 03/08/2022 168  ms Final    QRSD Interval 03/08/2022 72  ms Final    QT Interval 03/08/2022 382  ms Final    QTC Interval 03/08/2022 448  ms Final    P Axis 03/08/2022 40  degrees Final    QRS Axis 03/08/2022 -10  degrees Final    T Wave [place] 03/08/2022 17  degrees Final    Ventricular Rate 03/08/2022 106  BPM Final    QRSD Interval 03/08/2022 72  ms Final    QT Interval 03/08/2022 310  ms Final    QTC Interval 03/08/2022 411  ms Final    QRS Axis 03/08/2022 -5  degrees Final    T Wave [place] 03/08/2022 12  degrees Final    Ventricular Rate 03/08/2022 127  BPM Final    Atrial Rate 03/08/2022 127  BPM Final    QRSD Interval 03/08/2022 56  ms Final    QT Interval 03/08/2022 314  ms Final    QTC Interval 03/08/2022 456  ms Final    QRS Axis 03/08/2022 -2  degrees Final    T Wave [place] 03/08/2022 54  degrees Final   No results displayed because visit has over 200 results        Admission on 02/16/2022, Discharged on 02/20/2022   Component Date Value Ref Range Status    Sodium 02/16/2022 142  136 - 145 mmol/L Final    Potassium 02/16/2022 4 8  3 5 - 5 3 mmol/L Final    Chloride 02/16/2022 103  100 - 108 mmol/L Final    CO2 02/16/2022 29  21 - 32 mmol/L Final    ANION GAP 02/16/2022 10  4 - 13 mmol/L Final    BUN 02/16/2022 39* 5 - 25 mg/dL Final    Creatinine 02/16/2022 1 52* 0 60 - 1 30 mg/dL Final    Standardized to IDMS reference method    Glucose 02/16/2022 305* 65 - 140 mg/dL Final    If the patient is fasting, the ADA then defines impaired fasting glucose as > 100 mg/dL and diabetes as > or equal to 123 mg/dL  Specimen collection should occur prior to Sulfasalazine administration due to the potential for falsely depressed results  Specimen collection should occur prior to Sulfapyridine administration due to the potential for falsely elevated results   Calcium 02/16/2022 8 1* 8 3 - 10 1 mg/dL Final    Corrected Calcium 02/16/2022 8 7  8 3 - 10 1 mg/dL Final    AST 02/16/2022 17  5 - 45 U/L Final    Specimen collection should occur prior to Sulfasalazine administration due to the potential for falsely depressed results   ALT 02/16/2022 27  12 - 78 U/L Final    Specimen collection should occur prior to Sulfasalazine administration due to the potential for falsely depressed results   Alkaline Phosphatase 02/16/2022 74  46 - 116 U/L Final    Total Protein 02/16/2022 6 9  6 4 - 8 2 g/dL Final    Albumin 02/16/2022 3 2* 3 5 - 5 0 g/dL Final    Total Bilirubin 02/16/2022 0 38  0 20 - 1 00 mg/dL Final    Use of this assay is not recommended for patients undergoing treatment with eltrombopag due to the potential for falsely elevated results      eGFR 02/16/2022 34  ml/min/1 73sq m Final    WBC 02/16/2022 11 92* 4 31 - 10 16 Thousand/uL Final    RBC 02/16/2022 4 00  3 81 - 5 12 Million/uL Final    Hemoglobin 02/16/2022 11 7  11 5 - 15 4 g/dL Final    Hematocrit 02/16/2022 36 7  34 8 - 46 1 % Final    MCV 02/16/2022 92  82 - 98 fL Final    MCH 02/16/2022 29 3  26 8 - 34 3 pg Final    MCHC 02/16/2022 31 9  31 4 - 37 4 g/dL Final    RDW 02/16/2022 15 1  11 6 - 15 1 % Final    MPV 02/16/2022 11 9  8 9 - 12 7 fL Final    Platelets 77/63/8443 188  149 - 390 Thousands/uL Final    nRBC 02/16/2022 0  /100 WBCs Final    Neutrophils Relative 02/16/2022 68  43 - 75 % Final    Immat GRANS % 02/16/2022 1  0 - 2 % Final    Lymphocytes Relative 02/16/2022 21  14 - 44 % Final    Monocytes Relative 02/16/2022 8  4 - 12 % Final    Eosinophils Relative 02/16/2022 1  0 - 6 % Final    Basophils Relative 02/16/2022 1  0 - 1 % Final    Neutrophils Absolute 02/16/2022 8 06* 1 85 - 7 62 Thousands/µL Final    Immature Grans Absolute 02/16/2022 0 09  0 00 - 0 20 Thousand/uL Final    Lymphocytes Absolute 02/16/2022 2 50  0 60 - 4 47 Thousands/µL Final    Monocytes Absolute 02/16/2022 0 99  0 17 - 1 22 Thousand/µL Final    Eosinophils Absolute 02/16/2022 0 16  0 00 - 0 61 Thousand/µL Final    Basophils Absolute 02/16/2022 0 12* 0 00 - 0 10 Thousands/µL Final    Protime 02/16/2022 14 3  11 6 - 14 5 seconds Final    INR 02/16/2022 1 13  0 84 - 1 19 Final    PTT 02/16/2022 31  23 - 37 seconds Final    Therapeutic Heparin Range =  60-90 seconds    POC Glucose 02/17/2022 442* 65 - 140 mg/dl Final    POC Glucose 02/17/2022 469* 65 - 140 mg/dl Final    POC Glucose 02/17/2022 430* 65 - 140 mg/dl Final    POC Glucose 02/17/2022 331* 65 - 140 mg/dl Final    WBC 02/18/2022 10 85* 4 31 - 10 16 Thousand/uL Final    RBC 02/18/2022 2 93* 3 81 - 5 12 Million/uL Final    Hemoglobin 02/18/2022 8 5* 11 5 - 15 4 g/dL Final    Results verified by repeat    Hematocrit 02/18/2022 27 6* 34 8 - 46 1 % Final    MCV 02/18/2022 94  82 - 98 fL Final    MCH 02/18/2022 29 0  26 8 - 34 3 pg Final    MCHC 02/18/2022 30 8* 31 4 - 37 4 g/dL Final    RDW 02/18/2022 15 7* 11 6 - 15 1 % Final    MPV 02/18/2022 11 4  8 9 - 12 7 fL Final  Platelets 95/81/3236 168  149 - 390 Thousands/uL Final    nRBC 02/18/2022 0  /100 WBCs Final    This is an appended report  These results have been appended to a previously preliminary verified report   Neutrophils Relative 02/18/2022 62  43 - 75 % Final    Immat GRANS % 02/18/2022 1  0 - 2 % Final    Lymphocytes Relative 02/18/2022 25  14 - 44 % Final    Monocytes Relative 02/18/2022 10  4 - 12 % Final    Eosinophils Relative 02/18/2022 1  0 - 6 % Final    Basophils Relative 02/18/2022 1  0 - 1 % Final    Neutrophils Absolute 02/18/2022 6 81  1 85 - 7 62 Thousands/µL Final    Immature Grans Absolute 02/18/2022 0 08  0 00 - 0 20 Thousand/uL Final    Lymphocytes Absolute 02/18/2022 2 73  0 60 - 4 47 Thousands/µL Final    Monocytes Absolute 02/18/2022 1 07  0 17 - 1 22 Thousand/µL Final    Eosinophils Absolute 02/18/2022 0 08  0 00 - 0 61 Thousand/µL Final    Basophils Absolute 02/18/2022 0 08  0 00 - 0 10 Thousands/µL Final    Sodium 02/18/2022 136  136 - 145 mmol/L Final    Potassium 02/18/2022 5 7* 3 5 - 5 3 mmol/L Final    Chloride 02/18/2022 102  100 - 108 mmol/L Final    CO2 02/18/2022 25  21 - 32 mmol/L Final    ANION GAP 02/18/2022 9  4 - 13 mmol/L Final    BUN 02/18/2022 65* 5 - 25 mg/dL Final    Creatinine 02/18/2022 3 32* 0 60 - 1 30 mg/dL Final    Verified by repeat analysis    Glucose 02/18/2022 115  65 - 140 mg/dL Final    If the patient is fasting, the ADA then defines impaired fasting glucose as > 100 mg/dL and diabetes as > or equal to 123 mg/dL  Specimen collection should occur prior to Sulfasalazine administration due to the potential for falsely depressed results  Specimen collection should occur prior to Sulfapyridine administration due to the potential for falsely elevated results      Calcium 02/18/2022 7 5* 8 3 - 10 1 mg/dL Final    eGFR 02/18/2022 13  ml/min/1 73sq m Final    POC Glucose 02/18/2022 117  65 - 140 mg/dl Final    POC Glucose 02/18/2022 165* 65 - 140 mg/dl Final    POC Glucose 02/18/2022 304* 65 - 140 mg/dl Final    POC Glucose 02/18/2022 241* 65 - 140 mg/dl Final    WBC 02/19/2022 9 79  4 31 - 10 16 Thousand/uL Final    RBC 02/19/2022 2 68* 3 81 - 5 12 Million/uL Final    Hemoglobin 02/19/2022 7 9* 11 5 - 15 4 g/dL Final    Hematocrit 02/19/2022 24 7* 34 8 - 46 1 % Final    MCV 02/19/2022 92  82 - 98 fL Final    MCH 02/19/2022 29 5  26 8 - 34 3 pg Final    MCHC 02/19/2022 32 0  31 4 - 37 4 g/dL Final    RDW 02/19/2022 15 5* 11 6 - 15 1 % Final    MPV 02/19/2022 11 4  8 9 - 12 7 fL Final    Platelets 66/18/2374 167  149 - 390 Thousands/uL Final    nRBC 02/19/2022 0  /100 WBCs Final    Neutrophils Relative 02/19/2022 58  43 - 75 % Final    Immat GRANS % 02/19/2022 1  0 - 2 % Final    Lymphocytes Relative 02/19/2022 28  14 - 44 % Final    Monocytes Relative 02/19/2022 10  4 - 12 % Final    Eosinophils Relative 02/19/2022 2  0 - 6 % Final    Basophils Relative 02/19/2022 1  0 - 1 % Final    Neutrophils Absolute 02/19/2022 5 75  1 85 - 7 62 Thousands/µL Final    Immature Grans Absolute 02/19/2022 0 10  0 00 - 0 20 Thousand/uL Final    Lymphocytes Absolute 02/19/2022 2 73  0 60 - 4 47 Thousands/µL Final    Monocytes Absolute 02/19/2022 0 93  0 17 - 1 22 Thousand/µL Final    Eosinophils Absolute 02/19/2022 0 18  0 00 - 0 61 Thousand/µL Final    Basophils Absolute 02/19/2022 0 10  0 00 - 0 10 Thousands/µL Final    Sodium 02/19/2022 139  136 - 145 mmol/L Final    Potassium 02/19/2022 5 2  3 5 - 5 3 mmol/L Final    Chloride 02/19/2022 104  100 - 108 mmol/L Final    CO2 02/19/2022 28  21 - 32 mmol/L Final    ANION GAP 02/19/2022 7  4 - 13 mmol/L Final    BUN 02/19/2022 69* 5 - 25 mg/dL Final    Creatinine 02/19/2022 2 47* 0 60 - 1 30 mg/dL Final    Standardized to IDMS reference method    Glucose 02/19/2022 119  65 - 140 mg/dL Final    If the patient is fasting, the ADA then defines impaired fasting glucose as > 100 mg/dL and diabetes as > or equal to 123 mg/dL  Specimen collection should occur prior to Sulfasalazine administration due to the potential for falsely depressed results  Specimen collection should occur prior to Sulfapyridine administration due to the potential for falsely elevated results   Calcium 02/19/2022 6 8* 8 3 - 10 1 mg/dL Final    eGFR 02/19/2022 19  ml/min/1 73sq m Final    POC Glucose 02/19/2022 89  65 - 140 mg/dl Final    POC Glucose 02/19/2022 134  65 - 140 mg/dl Final    POC Glucose 02/19/2022 299* 65 - 140 mg/dl Final    POC Glucose 02/19/2022 426* 65 - 140 mg/dl Final    Sodium 02/20/2022 141  136 - 145 mmol/L Final    Potassium 02/20/2022 4 6  3 5 - 5 3 mmol/L Final    Chloride 02/20/2022 108  100 - 108 mmol/L Final    CO2 02/20/2022 26  21 - 32 mmol/L Final    ANION GAP 02/20/2022 7  4 - 13 mmol/L Final    BUN 02/20/2022 54* 5 - 25 mg/dL Final    Creatinine 02/20/2022 1 68* 0 60 - 1 30 mg/dL Final    Standardized to IDMS reference method    Glucose 02/20/2022 110  65 - 140 mg/dL Final    If the patient is fasting, the ADA then defines impaired fasting glucose as > 100 mg/dL and diabetes as > or equal to 123 mg/dL  Specimen collection should occur prior to Sulfasalazine administration due to the potential for falsely depressed results  Specimen collection should occur prior to Sulfapyridine administration due to the potential for falsely elevated results   Calcium 02/20/2022 7 0* 8 3 - 10 1 mg/dL Final    eGFR 02/20/2022 30  ml/min/1 73sq m Final    POC Glucose 02/20/2022 114  65 - 140 mg/dl Final    POC Glucose 02/20/2022 181* 65 - 140 mg/dl Final    POC Glucose 02/20/2022 270* 65 - 140 mg/dl Final         Patient Instructions     Recommend to hold metoprolol if the systolic blood pressure less than 90 or heart rate less than 60    Heart Failure   AMBULATORY CARE:   Heart failure  is a condition that does not allow your heart to fill or pump properly   Not enough oxygen in your blood gets to your organs and tissues  Fluid may not move through your body properly  Fluid builds up and causes swelling and trouble breathing  This is known as congestive heart failure  Heart failure may start in the left or right ventricle  Heart failure is often caused by damage or injury to your heart  The damage may be caused by other heart problems, diabetes, or high blood pressure  The damage may have also been caused by an infection  Heart failure is a long-term condition that tends to get worse over time  It is important to manage your health to improve your quality of life  Common signs and symptoms:   · Trouble breathing with activity that worsens to trouble breathing at rest    · Shortness of breath while lying flat    · Severe shortness of breath and coughing at night that usually wakes you    · Feeling lightheaded when you stand up    · Purple color around your mouth and nails    · Confusion or anxiety    · Chest pain at night    · Periods of no breathing, then breathing fast    · Lack of energy (often worsened by physical activity), or trouble sleeping    · Swelling in your ankles, legs, or abdomen    · Heartbeat that is fast or not regular    · Fingers and toes feel cool to the touch    Call your local emergency number (911 in the [address] [facility]) if:   · You have any of the following signs of a heart attack:      ? Squeezing, pressure, or pain in your chest    ? You may  also have any of the following:     § Discomfort or pain in your back, neck, jaw, stomach, or arm    § Shortness of breath    § Nausea or vomiting    § Lightheadedness or a sudden cold sweat      Call your doctor if:   · Your heartbeat is fast, slow, or uneven all the time  · You have symptoms of worsening heart failure:      ? Shortness of breath at rest, at night, or that is getting worse in any way    ? Weight gain of 3 or more pounds (1 4 kg) in a day, or more than your healthcare provider says is okay    ?  More swelling in your legs or ankles    ? Abdominal pain or swelling    ? More coughing    ? Loss of appetite    ? Feeling tired all the time    · You feel hopeless or depressed, or you have lost interest in things you used to enjoy  · You often feel worried or afraid  · You have questions or concerns about your condition or care  Treatment for heart failure  may include any of the following:  · Medicines  may be needed to help regulate your heart rhythm  You may also need medicines to lower your blood pressure, and to decrease extra fluids  · Oxygen  may help you breathe easier if your oxygen level is lower than normal  A CPAP machine may be used to keep your airway open while you sleep  · Cardiac rehab  is a program run by specialists who will help you safely strengthen your heart  In the program you will learn about exercise, relaxation, stress management, and heart-healthy nutrition  Cardiac rehab may be recommended if your heart failure is not severe  · Surgery  can be done to implant a pacemaker or another device in your chest to regulate your heart rhythm  Other types of surgery can open blocked heart vessels, replace a damaged heart valve, or remove scar tissue  Manage swelling from extra fluid:   · Elevate (raise) your legs above the level of your heart  This will help with fluid that builds up in your legs or ankles  Elevate your legs as often as possible during the day  Prop your legs on pillows or blankets to keep them elevated comfortably  Try not to stand for long periods of time during the day  Move around to keep your blood circulating  · Limit sodium (salt)  Ask how much sodium you can have each day  Your healthcare provider may give you a limit, such as 2,300 milligrams (mg) a day  Your provider or a dietitian can teach you how to read food labels for the number of mg in a food  He or she can also help you find ways to have less salt   For example, if you add salt to food as you cook, do not add more at the table  · Drink liquids as directed  You may need to limit the amount of liquid you drink within 24 hours  Your healthcare provider will tell you how much liquid to have and which liquids are best for you  He or she may tell you to limit liquid to 1 5 to 2 liters in a day  He or she will also tell you how often to drink liquid throughout the day  · Weigh yourself every morning  Use the same scale, in the same spot  Do this after you use the bathroom, but before you eat or drink  Wear the same type of clothing each time  Write down your weight and call your healthcare provider if you have a sudden weight gain  Swelling and weight gain are signs of fluid buildup  Manage heart failure: Your quality of life may improve with treatment and the following:  · Do not smoke  Nicotine and other chemicals in cigarettes and cigars can cause lung and heart damage  Ask your healthcare provider for information if you currently smoke and need help to quit  E-cigarettes or smokeless tobacco still contain nicotine  Talk to your healthcare provider before you use these products  · Do not drink alcohol or use illegal drugs  Alcohol and drugs can increase your risk for high blood pressure, diabetes, and coronary artery disease  · Eat heart-healthy foods  Heart-healthy foods include fruits, vegetables, lean meat (such as beef, chicken, or pork), and low-fat dairy products  Fatty fish such as salmon and tuna are also heart healthy  Other heart-healthy foods include walnuts, whole-grain breads, beans, and cooked beans  Replace butter and margarine with heart-healthy oils such as olive oil or canola oil  Your provider or a dietitian can help you create heart-healthy meal plans  · Manage any chronic health conditions you have  These include high blood pressure, diabetes, obesity, high cholesterol, metabolic syndrome, and COPD   You will have fewer symptoms if you manage these health conditions  Follow your healthcare provider's recommendations and follow up with him or her regularly  · Maintain a healthy weight  Being overweight can increase your risk for high blood pressure, diabetes, and coronary artery disease  These conditions can make your symptoms worse  Ask your healthcare provider how much you should weigh  Ask him or her to help you create a weight loss plan if you are overweight  · Stay active  Activity can help keep your symptoms from getting worse  Walking is a type of physical activity that helps maintain your strength and improve your mood  Physical activity also helps you manage your weight  Work with your healthcare provider to create an exercise plan that is right for you  · Get vaccines as directed  The flu and pneumonia can be severe for a person who has heart failure  Vaccines protect you from these infections  Get a flu shot every year as soon as it is recommended, usually in September or October  You may also need the pneumonia vaccine  Your healthcare provider can tell you if you need other vaccines, and when to get them  Follow up with your doctor or cardiologist within 7 days or as directed: You may need to return for other tests  You may need home health care  A healthcare provider will monitor your vital signs, weight, and make sure your medicines are working  Write down your questions so you remember to ask them during your visits  Join a support group:  Heart failure can be difficult to manage  It may be helpful to talk with others who have heart failure  You may learn how to better manage your condition or get emotional support  For more information:  · [address]  [place] , [place]   Phone: 5- [phone]  Web Address: https://www strong [employer]/  [address]22 Information is for End User's use only and may not be sold, redistributed or otherwise used for commercial purposes   All illustrations and images included in CareNotes® are the copyrighted property of [employer]  or [facility]illo   The above information is an  only  It is not intended as medical advice for individual conditions or treatments  Talk to your doctor, nurse or pharmacist before following any medical regimen to see if it is safe and effective for you  Follow with Consultants as per their and our suggestion    Follow up in 2  week(s) or as needed earlier    Follow all instructions as advised and discussed  Take your medications as prescribed  Call the office immediately if you experience any side effects  Ask questions if you do not understand  Keep your scheduled appointment as advised or come sooner if necessary or in doubt  Best time to call for non-urgent matter or questions on weekdays is between 9am and 12 noon  See physician for any new symptoms or worsening of current symptoms  Urgent or emergent situations call 911 and report to nearest emergency room  I spent  70 minutes taking care of this patient including clinical care, conseling, collaboration, chart, lab and consultaion review  Patient is to get labs today               Dr Chele Arellano MD  [facility]       "This note has been constructed using a voice recognition system  Therefore there may be syntax, spelling, and/or grammatical errors   Please call if you have any questions  "

## 2022-03-25 NOTE — ASSESSMENT & PLAN NOTE
Status post fall  Status for fracture  Status post a reverse surgery shoulder  Sling in the place  Being followed by orthopedic  Therapy per them

## 2022-03-25 NOTE — ASSESSMENT & PLAN NOTE
Lab Results   Component Value Date    CHOLESTEROL 104 03/10/2022     Lab Results   Component Value Date    HDL 30 (L) 03/10/2022     Lab Results   Component Value Date    TRIG 132 03/10/2022   Continue rosuvastatin 20 mg daily

## 2022-03-25 NOTE — ASSESSMENT & PLAN NOTE
Anemia multifactorial   Hemoglobin baseline in the 8-9 g  Recently received 2 transfusion    Will keep an close eye on hemoglobin

## 2022-03-25 NOTE — ASSESSMENT & PLAN NOTE
Lab Results   Component Value Date    EGFR 23 03/22/2022    EGFR 22 03/21/2022    EGFR 31 03/20/2022    CREATININE 2 10 (H) 03/22/2022    CREATININE 2 16 (H) 03/21/2022    CREATININE 1 66 (H) 03/20/2022

## 2022-03-27 ENCOUNTER — APPOINTMENT (EMERGENCY)
Dept: RADIOLOGY | Facility: HOSPITAL | Age: 72
DRG: 872 | End: 2022-03-27
Payer: MEDICARE

## 2022-03-27 ENCOUNTER — HOSPITAL ENCOUNTER (EMERGENCY)
Facility: HOSPITAL | Age: 72
Discharge: HOME | DRG: 872 | End: 2022-03-28
Attending: EMERGENCY MEDICINE | Admitting: EMERGENCY MEDICINE
Payer: MEDICARE

## 2022-03-27 DIAGNOSIS — N39.0 UTI (URINARY TRACT INFECTION): ICD-10-CM

## 2022-03-27 DIAGNOSIS — R50.9 FEVER: Primary | ICD-10-CM

## 2022-03-27 DIAGNOSIS — R53.1 GENERALIZED WEAKNESS: ICD-10-CM

## 2022-03-27 LAB
ALBUMIN SERPL BCP-MCNC: 2.8 G/DL (ref 3.5–5)
ALP SERPL-CCNC: 90 U/L (ref 46–116)
ALT SERPL W P-5'-P-CCNC: 20 U/L (ref 12–78)
ANION GAP SERPL CALCULATED.3IONS-SCNC: 5 MMOL/L (ref 4–13)
AST SERPL W P-5'-P-CCNC: 12 U/L (ref 5–45)
BASOPHILS # BLD AUTO: 0.04 THOUSANDS/ΜL (ref 0–0.1)
BASOPHILS NFR BLD AUTO: 0 % (ref 0–1)
BILIRUB SERPL-MCNC: 0.38 MG/DL (ref 0.2–1)
BUN SERPL-MCNC: 31 MG/DL (ref 5–25)
CALCIUM ALBUM COR SERPL-MCNC: 8.8 MG/DL (ref 8.3–10.1)
CALCIUM SERPL-MCNC: 7.8 MG/DL (ref 8.3–10.1)
CARDIAC TROPONIN I PNL SERPL HS: 12 NG/L
CHLORIDE SERPL-SCNC: 96 MMOL/L (ref 100–108)
CO2 SERPL-SCNC: 35 MMOL/L (ref 21–32)
CREAT SERPL-MCNC: 1.86 MG/DL (ref 0.6–1.3)
EOSINOPHIL # BLD AUTO: 0.05 THOUSAND/ΜL (ref 0–0.61)
EOSINOPHIL NFR BLD AUTO: 0 % (ref 0–6)
ERYTHROCYTE [DISTWIDTH] IN BLOOD BY AUTOMATED COUNT: 15.7 % (ref 11.6–15.1)
GFR SERPL CREATININE-BSD FRML MDRD: 27 ML/MIN/1.73SQ M
GLUCOSE SERPL-MCNC: 358 MG/DL (ref 65–140)
GLUCOSE SERPL-MCNC: 360 MG/DL (ref 65–140)
HCT VFR BLD AUTO: 29.9 % (ref 34.8–46.1)
HGB BLD-MCNC: 8.9 G/DL (ref 11.5–15.4)
IMM GRANULOCYTES # BLD AUTO: 0.06 THOUSAND/UL (ref 0–0.2)
IMM GRANULOCYTES NFR BLD AUTO: 0 % (ref 0–2)
LYMPHOCYTES # BLD AUTO: 1.76 THOUSANDS/ΜL (ref 0.6–4.47)
LYMPHOCYTES NFR BLD AUTO: 13 % (ref 14–44)
MAGNESIUM SERPL-MCNC: 1.9 MG/DL (ref 1.6–2.6)
MCH RBC QN AUTO: 28.5 PG (ref 26.8–34.3)
MCHC RBC AUTO-ENTMCNC: 29.8 G/DL (ref 31.4–37.4)
MCV RBC AUTO: 96 FL (ref 82–98)
MONOCYTES # BLD AUTO: 1.12 THOUSAND/ΜL (ref 0.17–1.22)
MONOCYTES NFR BLD AUTO: 8 % (ref 4–12)
NEUTROPHILS # BLD AUTO: 10.45 THOUSANDS/ΜL (ref 1.85–7.62)
NEUTS SEG NFR BLD AUTO: 79 % (ref 43–75)
NRBC BLD AUTO-RTO: 0 /100 WBCS
NT-PROBNP SERPL-MCNC: 3576 PG/ML
PLATELET # BLD AUTO: 155 THOUSANDS/UL (ref 149–390)
PMV BLD AUTO: 11.7 FL (ref 8.9–12.7)
POTASSIUM SERPL-SCNC: 4.1 MMOL/L (ref 3.5–5.3)
PROT SERPL-MCNC: 6.5 G/DL (ref 6.4–8.2)
RBC # BLD AUTO: 3.12 MILLION/UL (ref 3.81–5.12)
SODIUM SERPL-SCNC: 136 MMOL/L (ref 136–145)
WBC # BLD AUTO: 13.48 THOUSAND/UL (ref 4.31–10.16)

## 2022-03-27 PROCEDURE — 99284 EMERGENCY DEPT VISIT MOD MDM: CPT

## 2022-03-27 PROCEDURE — 83735 ASSAY OF MAGNESIUM: CPT | Performed by: EMERGENCY MEDICINE

## 2022-03-27 PROCEDURE — 82948 REAGENT STRIP/BLOOD GLUCOSE: CPT

## 2022-03-27 PROCEDURE — 36415 COLL VENOUS BLD VENIPUNCTURE: CPT | Performed by: EMERGENCY MEDICINE

## 2022-03-27 PROCEDURE — 99285 EMERGENCY DEPT VISIT HI MDM: CPT | Performed by: EMERGENCY MEDICINE

## 2022-03-27 PROCEDURE — 71045 X-RAY EXAM CHEST 1 VIEW: CPT

## 2022-03-27 PROCEDURE — 93005 ELECTROCARDIOGRAM TRACING: CPT

## 2022-03-27 PROCEDURE — 83880 ASSAY OF NATRIURETIC PEPTIDE: CPT | Performed by: EMERGENCY MEDICINE

## 2022-03-27 PROCEDURE — 85610 PROTHROMBIN TIME: CPT | Performed by: EMERGENCY MEDICINE

## 2022-03-27 PROCEDURE — 87040 BLOOD CULTURE FOR BACTERIA: CPT | Performed by: EMERGENCY MEDICINE

## 2022-03-27 PROCEDURE — 83605 ASSAY OF LACTIC ACID: CPT | Performed by: EMERGENCY MEDICINE

## 2022-03-27 PROCEDURE — 0241U HB NFCT DS VIR RESP RNA 4 TRGT: CPT | Performed by: EMERGENCY MEDICINE

## 2022-03-27 PROCEDURE — 84484 ASSAY OF TROPONIN QUANT: CPT | Performed by: EMERGENCY MEDICINE

## 2022-03-27 PROCEDURE — 85025 COMPLETE CBC W/AUTO DIFF WBC: CPT | Performed by: EMERGENCY MEDICINE

## 2022-03-27 PROCEDURE — 85730 THROMBOPLASTIN TIME PARTIAL: CPT | Performed by: EMERGENCY MEDICINE

## 2022-03-27 PROCEDURE — 80053 COMPREHEN METABOLIC PANEL: CPT | Performed by: EMERGENCY MEDICINE

## 2022-03-28 ENCOUNTER — HOSPITAL ENCOUNTER (INPATIENT)
Facility: HOSPITAL | Age: 72
LOS: 4 days | Discharge: HOME HEALTH | DRG: 872 | End: 2022-04-02
Attending: EMERGENCY MEDICINE
Payer: MEDICARE

## 2022-03-28 ENCOUNTER — APPOINTMENT (EMERGENCY)
Dept: RADIOLOGY | Facility: HOSPITAL | Age: 72
DRG: 872 | End: 2022-03-28
Payer: MEDICARE

## 2022-03-28 ENCOUNTER — TELEPHONE (OUTPATIENT)
Dept: INTERNAL MEDICINE CLINIC | Facility: CLINIC | Age: 72
End: 2022-03-28

## 2022-03-28 VITALS
OXYGEN SATURATION: 95 % | TEMPERATURE: 100.3 F | HEART RATE: 96 BPM | WEIGHT: 258 LBS | BODY MASS INDEX: 47.19 KG/M2 | RESPIRATION RATE: 20 BRPM | DIASTOLIC BLOOD PRESSURE: 67 MMHG | SYSTOLIC BLOOD PRESSURE: 159 MMHG

## 2022-03-28 DIAGNOSIS — I50.31 ACUTE DIASTOLIC CONGESTIVE HEART FAILURE (HCC): ICD-10-CM

## 2022-03-28 DIAGNOSIS — D64.9 ANEMIA, UNSPECIFIED TYPE: ICD-10-CM

## 2022-03-28 DIAGNOSIS — A41.9 SEPSIS (HCC): ICD-10-CM

## 2022-03-28 DIAGNOSIS — N18.32 STAGE 3B CHRONIC KIDNEY DISEASE (HCC): ICD-10-CM

## 2022-03-28 DIAGNOSIS — N17.9 AKI (ACUTE KIDNEY INJURY) (HCC): ICD-10-CM

## 2022-03-28 DIAGNOSIS — N39.0 UTI (URINARY TRACT INFECTION): ICD-10-CM

## 2022-03-28 DIAGNOSIS — E11.00 TYPE 2 DIABETES MELLITUS WITH HYPEROSMOLARITY WITHOUT COMA, WITHOUT LONG-TERM CURRENT USE OF INSULIN (HCC): ICD-10-CM

## 2022-03-28 DIAGNOSIS — R50.9 FEVER: ICD-10-CM

## 2022-03-28 DIAGNOSIS — I48.91 ATRIAL FIBRILLATION WITH RAPID VENTRICULAR RESPONSE (HCC): Primary | ICD-10-CM

## 2022-03-28 LAB
2HR DELTA HS TROPONIN: -9 NG/L
2HR DELTA HS TROPONIN: 1 NG/L
4HR DELTA HS TROPONIN: -12 NG/L
ALBUMIN SERPL BCP-MCNC: 2.9 G/DL (ref 3.5–5)
ALP SERPL-CCNC: 91 U/L (ref 46–116)
ALT SERPL W P-5'-P-CCNC: 18 U/L (ref 12–78)
ANION GAP SERPL CALCULATED.3IONS-SCNC: 6 MMOL/L (ref 4–13)
APTT PPP: 41 SECONDS (ref 23–37)
APTT PPP: 42 SECONDS (ref 23–37)
AST SERPL W P-5'-P-CCNC: 15 U/L (ref 5–45)
ATRIAL RATE: 98 BPM
BACTERIA UR QL AUTO: ABNORMAL /HPF
BASOPHILS # BLD AUTO: 0.06 THOUSANDS/ΜL (ref 0–0.1)
BASOPHILS NFR BLD AUTO: 0 % (ref 0–1)
BILIRUB SERPL-MCNC: 0.55 MG/DL (ref 0.2–1)
BILIRUB UR QL STRIP: NEGATIVE
BUN SERPL-MCNC: 31 MG/DL (ref 5–25)
CALCIUM ALBUM COR SERPL-MCNC: 9.4 MG/DL (ref 8.3–10.1)
CALCIUM SERPL-MCNC: 8.5 MG/DL (ref 8.3–10.1)
CARDIAC TROPONIN I PNL SERPL HS: 13 NG/L
CARDIAC TROPONIN I PNL SERPL HS: 33 NG/L
CARDIAC TROPONIN I PNL SERPL HS: 36 NG/L
CARDIAC TROPONIN I PNL SERPL HS: 45 NG/L
CHLORIDE SERPL-SCNC: 96 MMOL/L (ref 100–108)
CLARITY UR: ABNORMAL
CO2 SERPL-SCNC: 34 MMOL/L (ref 21–32)
COLOR UR: YELLOW
CREAT SERPL-MCNC: 2.05 MG/DL (ref 0.6–1.3)
EOSINOPHIL # BLD AUTO: 0.2 THOUSAND/ΜL (ref 0–0.61)
EOSINOPHIL NFR BLD AUTO: 1 % (ref 0–6)
ERYTHROCYTE [DISTWIDTH] IN BLOOD BY AUTOMATED COUNT: 15.6 % (ref 11.6–15.1)
FINE GRAN CASTS URNS QL MICRO: ABNORMAL /LPF
FLUAV RNA RESP QL NAA+PROBE: NEGATIVE
FLUAV RNA RESP QL NAA+PROBE: NEGATIVE
FLUBV RNA RESP QL NAA+PROBE: NEGATIVE
FLUBV RNA RESP QL NAA+PROBE: NEGATIVE
GFR SERPL CREATININE-BSD FRML MDRD: 24 ML/MIN/1.73SQ M
GLUCOSE SERPL-MCNC: 312 MG/DL (ref 65–140)
GLUCOSE SERPL-MCNC: 399 MG/DL (ref 65–140)
GLUCOSE UR STRIP-MCNC: ABNORMAL MG/DL
HCT VFR BLD AUTO: 35.3 % (ref 34.8–46.1)
HGB BLD-MCNC: 10.4 G/DL (ref 11.5–15.4)
HGB UR QL STRIP.AUTO: ABNORMAL
HYALINE CASTS #/AREA URNS LPF: ABNORMAL /LPF
IMM GRANULOCYTES # BLD AUTO: 0.22 THOUSAND/UL (ref 0–0.2)
IMM GRANULOCYTES NFR BLD AUTO: 1 % (ref 0–2)
INR PPP: 1.67 (ref 0.84–1.19)
INR PPP: 2.01 (ref 0.84–1.19)
KETONES UR STRIP-MCNC: NEGATIVE MG/DL
LACTATE SERPL-SCNC: 1.3 MMOL/L (ref 0.5–2)
LACTATE SERPL-SCNC: 1.4 MMOL/L (ref 0.5–2)
LEUKOCYTE ESTERASE UR QL STRIP: ABNORMAL
LYMPHOCYTES # BLD AUTO: 1.78 THOUSANDS/ΜL (ref 0.6–4.47)
LYMPHOCYTES NFR BLD AUTO: 8 % (ref 14–44)
MCH RBC QN AUTO: 28.2 PG (ref 26.8–34.3)
MCHC RBC AUTO-ENTMCNC: 29.5 G/DL (ref 31.4–37.4)
MCV RBC AUTO: 96 FL (ref 82–98)
MONOCYTES # BLD AUTO: 1.86 THOUSAND/ΜL (ref 0.17–1.22)
MONOCYTES NFR BLD AUTO: 8 % (ref 4–12)
NEUTROPHILS # BLD AUTO: 19.43 THOUSANDS/ΜL (ref 1.85–7.62)
NEUTS SEG NFR BLD AUTO: 82 % (ref 43–75)
NITRITE UR QL STRIP: NEGATIVE
NON-SQ EPI CELLS URNS QL MICRO: ABNORMAL /HPF
NRBC BLD AUTO-RTO: 0 /100 WBCS
NT-PROBNP SERPL-MCNC: 3733 PG/ML
OTHER STN SPEC: ABNORMAL
P AXIS: 19 DEGREES
PH UR STRIP.AUTO: 5 [PH]
PLATELET # BLD AUTO: 189 THOUSANDS/UL (ref 149–390)
PMV BLD AUTO: 11.6 FL (ref 8.9–12.7)
POTASSIUM SERPL-SCNC: 4.6 MMOL/L (ref 3.5–5.3)
PR INTERVAL: 150 MS
PROT SERPL-MCNC: 7.1 G/DL (ref 6.4–8.2)
PROT UR STRIP-MCNC: ABNORMAL MG/DL
PROTHROMBIN TIME: 19.2 SECONDS (ref 11.6–14.5)
PROTHROMBIN TIME: 22.2 SECONDS (ref 11.6–14.5)
QRS AXIS: -26 DEGREES
QRSD INTERVAL: 74 MS
QT INTERVAL: 352 MS
QTC INTERVAL: 449 MS
RBC # BLD AUTO: 3.69 MILLION/UL (ref 3.81–5.12)
RBC #/AREA URNS AUTO: ABNORMAL /HPF
RSV RNA RESP QL NAA+PROBE: NEGATIVE
RSV RNA RESP QL NAA+PROBE: NEGATIVE
SARS-COV-2 RNA RESP QL NAA+PROBE: NEGATIVE
SARS-COV-2 RNA RESP QL NAA+PROBE: NEGATIVE
SODIUM SERPL-SCNC: 136 MMOL/L (ref 136–145)
SP GR UR STRIP.AUTO: 1.02 (ref 1–1.03)
T WAVE AXIS: 20 DEGREES
UROBILINOGEN UR QL STRIP.AUTO: 0.2 E.U./DL
VENTRICULAR RATE: 98 BPM
WBC # BLD AUTO: 23.55 THOUSAND/UL (ref 4.31–10.16)
WBC #/AREA URNS AUTO: ABNORMAL /HPF
WBC CLUMPS # UR AUTO: ABNORMAL /UL

## 2022-03-28 PROCEDURE — 71045 X-RAY EXAM CHEST 1 VIEW: CPT

## 2022-03-28 PROCEDURE — 85730 THROMBOPLASTIN TIME PARTIAL: CPT | Performed by: EMERGENCY MEDICINE

## 2022-03-28 PROCEDURE — 93005 ELECTROCARDIOGRAM TRACING: CPT

## 2022-03-28 PROCEDURE — 83880 ASSAY OF NATRIURETIC PEPTIDE: CPT | Performed by: EMERGENCY MEDICINE

## 2022-03-28 PROCEDURE — 85610 PROTHROMBIN TIME: CPT | Performed by: EMERGENCY MEDICINE

## 2022-03-28 PROCEDURE — 84484 ASSAY OF TROPONIN QUANT: CPT | Performed by: EMERGENCY MEDICINE

## 2022-03-28 PROCEDURE — 96365 THER/PROPH/DIAG IV INF INIT: CPT

## 2022-03-28 PROCEDURE — 85025 COMPLETE CBC W/AUTO DIFF WBC: CPT | Performed by: EMERGENCY MEDICINE

## 2022-03-28 PROCEDURE — 99291 CRITICAL CARE FIRST HOUR: CPT

## 2022-03-28 PROCEDURE — 81001 URINALYSIS AUTO W/SCOPE: CPT | Performed by: EMERGENCY MEDICINE

## 2022-03-28 PROCEDURE — 93010 ELECTROCARDIOGRAM REPORT: CPT | Performed by: INTERNAL MEDICINE

## 2022-03-28 PROCEDURE — 82948 REAGENT STRIP/BLOOD GLUCOSE: CPT

## 2022-03-28 PROCEDURE — 99152 MOD SED SAME PHYS/QHP 5/>YRS: CPT | Performed by: EMERGENCY MEDICINE

## 2022-03-28 PROCEDURE — 96368 THER/DIAG CONCURRENT INF: CPT

## 2022-03-28 PROCEDURE — 87086 URINE CULTURE/COLONY COUNT: CPT | Performed by: EMERGENCY MEDICINE

## 2022-03-28 PROCEDURE — 02HV33Z INSERTION OF INFUSION DEVICE INTO SUPERIOR VENA CAVA, PERCUTANEOUS APPROACH: ICD-10-PCS | Performed by: EMERGENCY MEDICINE

## 2022-03-28 PROCEDURE — 87040 BLOOD CULTURE FOR BACTERIA: CPT | Performed by: EMERGENCY MEDICINE

## 2022-03-28 PROCEDURE — 36415 COLL VENOUS BLD VENIPUNCTURE: CPT | Performed by: EMERGENCY MEDICINE

## 2022-03-28 PROCEDURE — 96376 TX/PRO/DX INJ SAME DRUG ADON: CPT

## 2022-03-28 PROCEDURE — 96361 HYDRATE IV INFUSION ADD-ON: CPT

## 2022-03-28 PROCEDURE — 83605 ASSAY OF LACTIC ACID: CPT | Performed by: EMERGENCY MEDICINE

## 2022-03-28 PROCEDURE — 0241U HB NFCT DS VIR RESP RNA 4 TRGT: CPT | Performed by: EMERGENCY MEDICINE

## 2022-03-28 PROCEDURE — 84145 PROCALCITONIN (PCT): CPT | Performed by: INTERNAL MEDICINE

## 2022-03-28 PROCEDURE — 99285 EMERGENCY DEPT VISIT HI MDM: CPT | Performed by: EMERGENCY MEDICINE

## 2022-03-28 PROCEDURE — 80053 COMPREHEN METABOLIC PANEL: CPT | Performed by: EMERGENCY MEDICINE

## 2022-03-28 PROCEDURE — 96366 THER/PROPH/DIAG IV INF ADDON: CPT

## 2022-03-28 PROCEDURE — 99219 PR INITIAL OBSERVATION CARE/DAY 50 MINUTES: CPT | Performed by: INTERNAL MEDICINE

## 2022-03-28 RX ORDER — DOCUSATE SODIUM 100 MG/1
100 CAPSULE, LIQUID FILLED ORAL DAILY
Status: DISCONTINUED | OUTPATIENT
Start: 2022-03-29 | End: 2022-04-02 | Stop reason: HOSPADM

## 2022-03-28 RX ORDER — CEFTRIAXONE 1 G/50ML
1000 INJECTION, SOLUTION INTRAVENOUS ONCE
Status: COMPLETED | OUTPATIENT
Start: 2022-03-28 | End: 2022-03-28

## 2022-03-28 RX ORDER — HEPARIN SODIUM 5000 [USP'U]/ML
5000 INJECTION, SOLUTION INTRAVENOUS; SUBCUTANEOUS EVERY 8 HOURS SCHEDULED
Status: DISCONTINUED | OUTPATIENT
Start: 2022-03-28 | End: 2022-03-28

## 2022-03-28 RX ORDER — BISACODYL 10 MG
10 SUPPOSITORY, RECTAL RECTAL DAILY PRN
Status: DISCONTINUED | OUTPATIENT
Start: 2022-03-28 | End: 2022-04-02 | Stop reason: HOSPADM

## 2022-03-28 RX ORDER — ATORVASTATIN CALCIUM 80 MG/1
80 TABLET, FILM COATED ORAL
Status: DISCONTINUED | OUTPATIENT
Start: 2022-03-29 | End: 2022-04-02 | Stop reason: HOSPADM

## 2022-03-28 RX ORDER — TORSEMIDE 10 MG/1
10 TABLET ORAL DAILY
Status: DISCONTINUED | OUTPATIENT
Start: 2022-03-29 | End: 2022-03-30

## 2022-03-28 RX ORDER — LIDOCAINE HYDROCHLORIDE AND EPINEPHRINE 10; 10 MG/ML; UG/ML
INJECTION, SOLUTION INFILTRATION; PERINEURAL
Status: COMPLETED
Start: 2022-03-28 | End: 2022-03-28

## 2022-03-28 RX ORDER — ACETAMINOPHEN 325 MG/1
650 TABLET ORAL ONCE
Status: COMPLETED | OUTPATIENT
Start: 2022-03-28 | End: 2022-03-28

## 2022-03-28 RX ORDER — DILTIAZEM HYDROCHLORIDE 5 MG/ML
15 INJECTION INTRAVENOUS ONCE AS NEEDED
Status: COMPLETED | OUTPATIENT
Start: 2022-03-28 | End: 2022-03-29

## 2022-03-28 RX ORDER — LIDOCAINE HYDROCHLORIDE AND EPINEPHRINE 10; 10 MG/ML; UG/ML
1 INJECTION, SOLUTION INFILTRATION; PERINEURAL ONCE
Status: COMPLETED | OUTPATIENT
Start: 2022-03-28 | End: 2022-03-28

## 2022-03-28 RX ORDER — ONDANSETRON 2 MG/ML
4 INJECTION INTRAMUSCULAR; INTRAVENOUS EVERY 6 HOURS PRN
Status: DISCONTINUED | OUTPATIENT
Start: 2022-03-28 | End: 2022-04-02 | Stop reason: HOSPADM

## 2022-03-28 RX ORDER — METOPROLOL TARTRATE 50 MG/1
50 TABLET, FILM COATED ORAL EVERY 12 HOURS SCHEDULED
Status: DISCONTINUED | OUTPATIENT
Start: 2022-03-28 | End: 2022-03-29

## 2022-03-28 RX ORDER — AMLODIPINE BESYLATE 5 MG/1
5 TABLET ORAL DAILY
Status: DISCONTINUED | OUTPATIENT
Start: 2022-03-29 | End: 2022-03-29

## 2022-03-28 RX ORDER — MONTELUKAST SODIUM 10 MG/1
10 TABLET ORAL
Status: DISCONTINUED | OUTPATIENT
Start: 2022-03-28 | End: 2022-04-02 | Stop reason: HOSPADM

## 2022-03-28 RX ORDER — MAGNESIUM HYDROXIDE/ALUMINUM HYDROXICE/SIMETHICONE 120; 1200; 1200 MG/30ML; MG/30ML; MG/30ML
30 SUSPENSION ORAL EVERY 6 HOURS PRN
Status: DISCONTINUED | OUTPATIENT
Start: 2022-03-28 | End: 2022-04-02 | Stop reason: HOSPADM

## 2022-03-28 RX ORDER — ALBUTEROL SULFATE 90 UG/1
2 AEROSOL, METERED RESPIRATORY (INHALATION) EVERY 6 HOURS PRN
Status: DISCONTINUED | OUTPATIENT
Start: 2022-03-28 | End: 2022-04-02 | Stop reason: HOSPADM

## 2022-03-28 RX ORDER — CEPHALEXIN 500 MG/1
500 CAPSULE ORAL EVERY 12 HOURS SCHEDULED
Qty: 10 CAPSULE | Refills: 0 | Status: SHIPPED | OUTPATIENT
Start: 2022-03-28 | End: 2022-04-02 | Stop reason: HOSPADM

## 2022-03-28 RX ORDER — ACETAMINOPHEN 325 MG/1
650 TABLET ORAL ONCE
Status: CANCELLED | OUTPATIENT
Start: 2022-03-28 | End: 2022-03-28

## 2022-03-28 RX ORDER — DILTIAZEM HYDROCHLORIDE 5 MG/ML
15 INJECTION INTRAVENOUS ONCE
Status: COMPLETED | OUTPATIENT
Start: 2022-03-28 | End: 2022-03-28

## 2022-03-28 RX ORDER — NYSTATIN 100000 [USP'U]/G
POWDER TOPICAL 2 TIMES DAILY
Status: DISCONTINUED | OUTPATIENT
Start: 2022-03-29 | End: 2022-03-30

## 2022-03-28 RX ADMIN — SODIUM CHLORIDE 1000 ML: 0.9 INJECTION, SOLUTION INTRAVENOUS at 14:41

## 2022-03-28 RX ADMIN — LIDOCAINE HYDROCHLORIDE,EPINEPHRINE BITARTRATE 1 ML: 10; .01 INJECTION, SOLUTION INFILTRATION; PERINEURAL at 17:44

## 2022-03-28 RX ADMIN — DOXYCYCLINE 100 MG: 100 INJECTION, POWDER, LYOPHILIZED, FOR SOLUTION INTRAVENOUS at 17:43

## 2022-03-28 RX ADMIN — DILTIAZEM HYDROCHLORIDE 15 MG: 5 INJECTION INTRAVENOUS at 14:40

## 2022-03-28 RX ADMIN — ACETAMINOPHEN 650 MG: 325 TABLET, FILM COATED ORAL at 02:20

## 2022-03-28 RX ADMIN — ACETAMINOPHEN 650 MG: 325 TABLET, FILM COATED ORAL at 18:45

## 2022-03-28 RX ADMIN — METOPROLOL TARTRATE 50 MG: 50 TABLET, FILM COATED ORAL at 23:44

## 2022-03-28 RX ADMIN — INSULIN LISPRO 6 UNITS: 100 INJECTION, SOLUTION INTRAVENOUS; SUBCUTANEOUS at 23:42

## 2022-03-28 RX ADMIN — CEFTRIAXONE 1000 MG: 1 INJECTION, SOLUTION INTRAVENOUS at 03:34

## 2022-03-28 RX ADMIN — DILTIAZEM HYDROCHLORIDE 10 MG/HR: 5 INJECTION INTRAVENOUS at 17:43

## 2022-03-28 RX ADMIN — MONTELUKAST 10 MG: 10 TABLET, FILM COATED ORAL at 23:44

## 2022-03-28 RX ADMIN — LIDOCAINE HYDROCHLORIDE AND EPINEPHRINE 1 ML: 10; 10 INJECTION, SOLUTION INFILTRATION; PERINEURAL at 17:44

## 2022-03-28 NOTE — ED NOTES
IV attempted X2 by MD at bedside  Unsuccessful   Central Line in process by MD Viviane Massey, RN  03/28/22 0887

## 2022-03-28 NOTE — ED NOTES
Temp of 101 7 noted  Dr Keyon Trejo made aware   New orders in progress     [facility]  03/28/22 1830

## 2022-03-28 NOTE — ED NOTES
Pt ok to eat as per Dr Antwan Bennett  Dinner tray handed to pt with daughter at bedside        Mayra Woods RN  03/28/22 [id number]

## 2022-03-28 NOTE — TELEPHONE ENCOUNTER
Patients daughter called this morning regarding her recent ED visit and diagnosis of urinary tract infection  Daughter said she was given IV antibiotics and sent home  This morning patient woke up with confusion , weakness and a fever of 104 F  I advised the daughter to take her back to the ED for evaluation  Daughter agreed

## 2022-03-28 NOTE — TELEPHONE ENCOUNTER
[facility] they can work with her shoulder then if this is already set up  Can we please provide them a copy of the reverse total shoulder protocol

## 2022-03-28 NOTE — ED PROVIDER NOTES
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 Taking?    Dory Coon 30G X 8 MM MISC  Outside Facility (Specify) Yes No   Toujeo SoloStar 300 units/mL CONCENTRATED U-300 injection pen (1-unit dial)   Yes No   Sig: Inject 20 Units under the skin daily at bedtime     Trulicity 1 5 AT/0 9IT SOPN  Outside Facility (Specify) Yes No   Sig: inject 0 5 milliliter subcutaneously every week 28   acetaminophen (TYLENOL) 325 mg tablet  Outside Facility (Specify) Yes No   Sig: Take 650 mg by mouth every 6 (six) hours as needed for mild pain     albuterol (PROVENTIL HFA,VENTOLIN HFA) 90 mcg/act inhaler   No No   Sig: Inhale 2 puffs every 6 (six) hours as needed for wheezing   amLODIPine (NORVASC) 5 mg tablet   No No   Sig: Take 1 tablet (5 mg total) by mouth daily   ammonium lactate (LAC-HYDRIN) 12 % lotion  Outside Facility (Specify) Yes No   Sig: APPLY TOPICALLY TO AFFECTED AREAS twice a day   apixaban (ELIQUIS) 5 mg  Outside Facility (Specify) No No   Sig: Take 1 tablet (5 mg total) by mouth 2 (two) times a day   desloratadine (CLARINEX) 5 MG tablet  Outside Facility (Specify) Yes No   Sig: Take by mouth daily     docusate sodium (COLACE) 100 mg capsule   Yes No   Sig: Take 100 mg by mouth in the morning   lidocaine (LIDODERM) 5 %  Outside Facility (Specify) No No   Sig: Apply 2 patches topically daily Remove & Discard patch within 12 hours or as directed by MD   metoprolol tartrate (LOPRESSOR) 50 mg tablet   No No   Sig: Take 1 tablet (50 mg total) by mouth every 12 (twelve) hours   montelukast (SINGULAIR) 10 mg tablet  Outside Facility (Specify) Yes No   Sig: Take 10 mg by mouth daily     nystatin (MYCOSTATIN) powder  Outside Facility (Specify) Yes No   Sig: Apply topically 2 (two) times a day   pregabalin (LYRICA) 100 mg capsule  Outside Facility (Specify) Yes No   Sig: Take 100 mg by mouth 2 (two) times a day    rosuvastatin (CRESTOR) 10 MG tablet  Outside Facility (Specify) Yes No   Sig: 10 mg daily     torsemide (DEMADEX) 10 mg tablet   No No   Sig: Take 1 tablet (10 mg total) by mouth daily      Facility-Administered Medications: None       Past Medical History:   Diagnosis Date    A-fib ([place] [address] )     Anemia     Asthma     [facility]congest[facility] failure) ([facility])     Chronic kidney disease     CKD (chronic kidney disease)     COVID-19     January 2022    Diabetes mellitus ([facility] )     GERD (gastroesophageal reflux disease)     Hyperlipidemia     Kidney stones     PONV (postoperative nausea and vomiting)     SVT (supraventricular tachycardia) ([facility])        Past Surgical History:   Procedure Laterality Date    APPENDECTOMY      CYSTOSCOPY W/ LASER LITHOTRIPSY Left 7/12/2016    Procedure: CYSTOSCOPY URETEROSCOPY WITH LITHOTRIPSY HOLMIUM LASER, RETROGRADE PYELOGRAM AND INSERTION STENT URETERAL;  Surgeon: Jerson Watts MD;  Location: [address] [place] [address]; Service:     DILATION AND CURETTAGE OF UTERUS      IR THORACENTESIS  3/18/2022    JOINT REPLACEMENT      right knee    KNEE ARTHROPLASTY Right     ND CYSTOURETHROSCOPY,URETER CATHETER Left 2016    Procedure: CYSTOSCOPY RETROGRADE PYELOGRAM WITH INSERTION STENT URETERAL, left;  Surgeon: Shelley Garcia MD;  Location: [address] [place] [address];  Service: Urology    ND RECONSTR TOTAL SHOULDER IMPLANT Left 3/1/2022    Procedure: ARTHROPLASTY SHOULDER REVERSE;  Surgeon: Matty Villegas MD;  Location[facility];  Service: Orthopedics    SHOULDER ARTHROTOMY Left        Family History   Problem Relation Age of Onset    Heart disease Mother     Emphysema Maternal Grandmother     Heart disease Family      I have reviewed and agree with the history as documented  E-Cigarette/Vaping    E-Cigarette Use Never User      E-Cigarette/Vaping Substances     Social History     Tobacco Use    Smoking status: Former Smoker     Packs/day:      Years:      Pack years: 20      Types: Cigarettes     Quit date: 1996     Years since quittin 7    Smokeless tobacco: Never Used   Vaping Use    Vaping Use: Never used   Substance Use Topics    Alcohol use: Not Currently     Comment: rarely    Drug use: No       Review of Systems   Constitutional: Positive for fatigue and fever  Negative for activity change, appetite change and chills  HENT: Negative for congestion and ear pain  Eyes: Negative for pain and discharge  Respiratory: Negative for cough, chest tightness, shortness of breath, wheezing and stridor  Cardiovascular: Negative for chest pain and palpitations  Gastrointestinal: Negative for abdominal distention, abdominal pain, constipation, diarrhea and nausea  Endocrine: Negative for cold intolerance  Genitourinary: Negative for dysuria, frequency and urgency  Musculoskeletal: Negative for arthralgias and back pain  Skin: Negative for color change and rash     Allergic/Immunologic: Negative for environmental allergies and food allergies  Neurological: Negative for dizziness, weakness, numbness and headaches  Hematological: Negative for adenopathy  Psychiatric/Behavioral: Negative for agitation, behavioral problems and confusion  The patient is not nervous/anxious  All other systems reviewed and are negative  Physical Exam  Physical Exam  Vitals and nursing note reviewed  Constitutional:       Appearance: She is well-developed  HENT:      Head: Normocephalic and atraumatic  Eyes:      Conjunctiva/sclera: Conjunctivae normal    Cardiovascular:      Rate and Rhythm: Normal rate and regular rhythm  Heart sounds: Normal heart sounds  Pulmonary:      Effort: Pulmonary effort is normal       Breath sounds: Rales present  Comments: Fine rales noted to bibasilar lungs  Abdominal:      General: Bowel sounds are normal  There is no distension  Palpations: Abdomen is soft  Tenderness: There is no abdominal tenderness  Musculoskeletal:         General: Normal range of motion  Cervical back: Normal range of motion and neck supple  Right lower leg: Edema present  Left lower leg: Edema present  Comments: 1+ pitting edema noted to bilateral lower extremities that is chronic for patient  Surgical wound to the left shoulder is healing very well  No signs of infection noted  Patient does not have any increased pain to the area  Skin:     General: Skin is warm and dry  Neurological:      Mental Status: She is alert and oriented to person, place, and time  Psychiatric:         Behavior: Behavior normal          Thought Content:  Thought content normal          Judgment: Judgment normal          Vital Signs  ED Triage Vitals   Temperature Pulse Respirations Blood Pressure SpO2   03/27/22 2232 03/27/22 2232 03/27/22 2232 03/27/22 2232 03/27/22 2232   100 3 °F (37 9 °C) (!) 111 20 124/56 98 %      Temp Source Heart Rate Source Patient Position - Orthostatic VS BP Location FiO2 (%)   03/27/22 2232 03/27/22 2232 03/27/22 2232 03/27/22 2232 --   Tympanic Monitor Sitting Left arm       Pain Score       03/28/22 0220       Med Not Given for Pain - for MAR use only           Vitals:    03/28/22 0100 03/28/22 0130 03/28/22 0230 03/28/22 0300   BP: 149/71 149/64 169/69 148/65   Pulse: 100 102 96 96   Patient Position - Orthostatic VS: Sitting Sitting Lying Lying         Visual Acuity      ED Medications  Medications   cefTRIAXone (ROCEPHIN) IVPB (premix in dextrose) 1,000 mg 50 mL (1,000 mg Intravenous New Bag 3/28/22 0334)   acetaminophen (TYLENOL) tablet 650 mg (650 mg Oral Given 3/28/22 0220)       Diagnostic Studies  Results Reviewed     Procedure Component Value Units Date/Time    Urine Microscopic [[id number]]  (Abnormal) Collected: 03/28/22 0152    Lab Status: Final result Specimen: Urine, Other Updated: 03/28/22 0223     RBC, UA 2-4 /hpf      WBC, UA 30-50 /hpf      Epithelial Cells Occasional /hpf      Bacteria, UA Innumerable /hpf      Hyaline Casts, UA 2-4 /lpf      Fine granular casts 1-2 /lpf      WBC Clumps Occasional     OTHER OBSERVATIONS Transitional Epithelial Cells    Urine culture [[id number]] Collected: 03/28/22 0152    Lab Status:  In process Specimen: Urine, Other Updated: 03/28/22 0223    HS Troponin I 2hr [[id number]]  (Normal) Collected: 03/28/22 0134    Lab Status: Final result Specimen: Blood from Hand, Right Updated: 03/28/22 0206     hs TnI 2hr 13 ng/L      Delta 2hr hsTnI 1 ng/L     UA w Reflex to Microscopic w Reflex to Culture [[id number]]  (Abnormal) Collected: 03/28/22 0152    Lab Status: Final result Specimen: Urine, Other Updated: 03/28/22 0158     Color, UA Yellow     Clarity, UA Slightly Cloudy     Specific [place], UA 1 020     pH, UA 5 0     Leukocytes, UA Small     Nitrite, UA Negative     Protein, UA 30 (1+) mg/dl      Glucose,  (1/10%) mg/dl      Ketones, UA Negative mg/dl      Urobilinogen, UA 0 2 E U /dl Bilirubin, UA Negative     Blood, UA Trace-Intact    Blood culture #2 [[id number]] Collected: 03/28/22 0134    Lab Status: In process Specimen: Blood from Hand, Right Updated: 03/28/22 0138    Lactic acid [[id number]]  (Normal) Collected: 03/27/22 2359    Lab Status: Final result Specimen: Blood from Arm, Left Updated: 03/28/22 0038     LACTIC ACID 1 4 mmol/L     Narrative:      Result may be elevated if tourniquet was used during collection  Protime-INR [[id number]]  (Abnormal) Collected: 03/27/22 2359    Lab Status: Final result Specimen: Blood from Arm, Left Updated: 03/28/22 0024     Protime 19 2 seconds      INR 1 67    APTT [[id number]]  (Abnormal) Collected: 03/27/22 2359    Lab Status: Final result Specimen: Blood from Arm, Left Updated: 03/28/22 0024     PTT 41 seconds     COVID/FLU/RSV - 2 hour TAT [[id number]]  (Normal) Collected: 03/27/22 2320    Lab Status: Final result Specimen: Nares from Nose Updated: 03/28/22 0005     SARS-CoV-2 Negative     INFLUENZA A PCR Negative     INFLUENZA B PCR Negative     RSV PCR Negative    Narrative:      FOR PEDIATRIC PATIENTS - copy/paste COVID Guidelines URL to browser: [email or web address] org/  [employer]    SARS-CoV-2 assay is a Nucleic Acid Amplification assay intended for the  qualitative detection of nucleic acid from SARS-CoV-2 in nasopharyngeal  swabs  Results are for the presumptive identification of SARS-CoV-2 RNA  Positive results are indicative of infection with SARS-CoV-2, the virus  causing COVID-19, but do not rule out bacterial infection or co-infection  with other viruses  Laboratories within the [place] and its  territories are required to report all positive results to the appropriate  public health authorities  Negative results do not preclude SARS-CoV-2  infection and should not be used as the sole basis for treatment or other  patient management decisions   Negative results must be combined with  clinical observations, patient history, and epidemiological information  This test has not been FDA cleared or approved  This test has been authorized by FDA under an Emergency Use Authorization  (EUA)  This test is only authorized for the duration of time the  declaration that circumstances exist justifying the authorization of the  emergency use of an in vitro diagnostic tests for detection of SARS-CoV-2  virus and/or diagnosis of COVID-19 infection under section 564(b)(1) of  the Act, 21 U  S C  355SRZ-4(X)(4), unless the authorization is terminated  or revoked sooner  The test has been validated but independent review by FDA  and CLIA is pending  Test performed using [employer] GeneXpert: This RT-PCR assay targets N2,  a region unique to SARS-CoV-2  A conserved region in the E-gene was chosen  for pan-Sarbecovirus detection which includes SARS-CoV-2  Blood culture #1 [[id number]] Collected: 03/27/22 2359    Lab Status:  In process Specimen: Blood from Arm, Left Updated: 03/28/22 0003    Magnesium [[id number]]  (Normal) Collected: 03/27/22 2318    Lab Status: Final result Specimen: Blood from Arm, Right Updated: 03/27/22 2352     Magnesium 1 9 mg/dL     NT-BNP PRO [[id number]]  (Abnormal) Collected: 03/27/22 2318    Lab Status: Final result Specimen: Blood from Arm, Right Updated: 03/27/22 2352     NT-proBNP 3,576 pg/mL     HS Troponin 0hr (reflex protocol) [[id number]]  (Normal) Collected: 03/27/22 2318    Lab Status: Final result Specimen: Blood from Arm, Right Updated: 03/27/22 2351     hs TnI 0hr 12 ng/L     Comprehensive metabolic panel [[id number]]  (Abnormal) Collected: 03/27/22 2318    Lab Status: Final result Specimen: Blood from Arm, Right Updated: 03/27/22 2344     Sodium 136 mmol/L      Potassium 4 1 mmol/L      Chloride 96 mmol/L      CO2 35 mmol/L      ANION GAP 5 mmol/L      BUN 31 mg/dL      Creatinine 1 86 mg/dL      Glucose 358 mg/dL      Calcium 7 8 mg/dL      Corrected Calcium 8 8 mg/dL AST 12 U/L      ALT 20 U/L      Alkaline Phosphatase 90 U/L      Total Protein 6 5 g/dL      Albumin 2 8 g/dL      Total Bilirubin 0 38 mg/dL      eGFR 27 ml/min/1 73sq m     Narrative:      [place] guidelines for Chronic Kidney Disease (CKD):     Stage 1 with normal or high GFR (GFR > 90 mL/min/1 73 square meters)    Stage 2 Mild CKD (GFR = 60-89 mL/min/1 73 square meters)    Stage 3A Moderate CKD (GFR = 45-59 mL/min/1 73 square meters)    Stage 3B Moderate CKD (GFR = 30-44 mL/min/1 73 square meters)    Stage 4 Severe CKD (GFR = 15-29 mL/min/1 73 square meters)    Stage 5 End Stage CKD (GFR <15 mL/min/1 73 square meters)  Note: GFR calculation is accurate only with a steady state creatinine    CBC and differential [[id number]]  (Abnormal) Collected: 03/27/22 2318    Lab Status: Final result Specimen: Blood from Arm, Right Updated: 03/27/22 2326     WBC 13 48 Thousand/uL      RBC 3 12 Million/uL      Hemoglobin 8 9 g/dL      Hematocrit 29 9 %      MCV 96 fL      MCH 28 5 pg      MCHC 29 8 g/dL      RDW 15 7 %      MPV 11 7 fL      Platelets 567 Thousands/uL      nRBC 0 /100 WBCs      Neutrophils Relative 79 %      Immat GRANS % 0 %      Lymphocytes Relative 13 %      Monocytes Relative 8 %      Eosinophils Relative 0 %      Basophils Relative 0 %      Neutrophils Absolute 10 45 Thousands/µL      Immature Grans Absolute 0 06 Thousand/uL      Lymphocytes Absolute 1 76 Thousands/µL      Monocytes Absolute 1 12 Thousand/µL      Eosinophils Absolute 0 05 Thousand/µL      Basophils Absolute 0 04 Thousands/µL     Fingerstick Glucose (POCT) [[id number]]  (Abnormal) Collected: 03/27/22 2320    Lab Status: Final result Updated: 03/27/22 2321     POC Glucose 360 mg/dl                  XR chest 1 view portable    (Results Pending)              Procedures  ECG 12 Lead Documentation Only    Date/Time: 3/27/2022 11:42 PM  Performed by: Justyna Piper DO  Authorized by: Justyna Piper DO Indications / Diagnosis:  Fever  ECG reviewed by me, the ED Provider: yes    Patient location:  ED  Previous ECG:     Previous ECG:  Compared to current    Similarity:  No change    Comparison to cardiac monitor: Yes    Interpretation:     Interpretation: abnormal    Comments:      Sinus rhythm, rate 98, normal axis, normal intervals, no acute ST elevations noted, low amplitude T-waves noted throughout, Q-waves noted in lead 3 and AVF suggesting inferior infarct of undetermined age that is unchanged from previous study  ED Course  ED Course as of 03/28/22 0342   Uche Gallegos Mar 27, 2022   2321 Fingerstick glucose 360   Mon Mar 28, 2022   0144 Patient re-examined at bedside  Lab and radiology results are essentially at baseline  Her chest x-ray does not show any signs of infection  Patient's urine wound to the left shoulder looks good with no signs of infection  Patient does not have any pain  Patient complains of generalized weakness since her discharge from hospital   Patient has follow-up with Cardiology in the morning  Patient is also supposed to start in home rehab for strength training this week  Patient will also see her PCP this week  I offered patient admission versus discharging home with her outpatient follow-ups  At this time patient would prefer to go home  Patient usually ambulates with the wheelchair  Patient's niece takes close care of her  At this time UA is pending  Once UA is resulted patient can be discharged home  MDM  Number of Diagnoses or Management Options  Fever: new and requires workup  Generalized weakness: new and requires workup  UTI (urinary tract infection): new and requires workup  Diagnosis management comments: Patient triggered SIRS with tachycardia and tachypnea    Will obtain septic workup as well as chest x-ray  Given antipyretic and continue to monitor patient for worsening symptoms       Amount and/or Complexity of Data Reviewed  Clinical lab tests: ordered and reviewed  Tests in the radiology section of CPT®: ordered and reviewed  Tests in the medicine section of CPT®: ordered and reviewed  Review and summarize past medical records: yes  Independent visualization of images, tracings, or specimens: yes    Risk of Complications, Morbidity, and/or Mortality  General comments: Patient's lab work was at baseline  Patient did not have any leukocytosis  After Tylenol where it of patient did have an episode of fever and was given Tylenol  Patient's UA showed concern for UTI  Patient empirically placed on Keflex  Patient given a dose of IV Rocephin in the emergency department prior to discharge  At this time UTI may be the source of patient's increased weakness  Patient usually ambulates with a wheelchair  I offered to admit patient for increasing weakness however patient would like to go home as she has a cardiology appointment in the morning and she is scheduled to start physical therapy for strength training this week  At this time patient is discharged home with follow-up to her PCP as well as Cardiology along with all of her other outpatient appointments  Close return instructions given to return to the ER for any worsening symptoms  Patient agrees with discharge plan  Patient well appearing at time of discharge  Please Note: Fluency Direct voice recognition software may have been used in the creation of this document  Wrong words or sound a like substitutions may have occurred due to the inherent limitations of the voice software         Patient Progress  Patient progress: stable      Disposition  Final diagnoses:   Fever   UTI (urinary tract infection)   Generalized weakness     Time reflects when diagnosis was documented in both MDM as applicable and the Disposition within this note     Time User Action Codes Description Comment    3/28/2022  3:23 AM Rory Breaux Add [R50 9] Fever     3/28/2022  3:23 AM Nelsy Guo Add [N39 0] UTI (urinary tract infection)     3/28/2022  3:23 AM Nelsymoon Guo Add [R53 1] Generalized weakness       ED Disposition     ED Disposition Condition Date/Time Comment    Discharge Stable Mon Mar 28, 2022  3:23 AM [place]  discharge to home/self care  Follow-up Information     Follow up With Specialties Details Why Contact Shawanda Bhatt MD Family Medicine In 2 days  [facility]  [address]  [phone]      Your cardiologist   As scheduled in the morning           Patient's Medications   Discharge Prescriptions    CEPHALEXIN (KEFLEX) 500 MG CAPSULE    Take 1 capsule (500 mg total) by mouth every 12 (twelve) hours for 5 days       Start Date: 3/28/2022 End Date: 4/2/2022       Order Dose: 500 mg       Quantity: 10 capsule    Refills: 0       No discharge procedures on file      PDMP Review       Value Time User    PDMP Reviewed  Yes 3/12/2022 10:25 AM RK Owens          ED Provider  Electronically Signed by           Mahogany Bravo DO  03/28/22 9585

## 2022-03-28 NOTE — ED NOTES
Pt seen, assessed and d/c by provider  Pt appeared to be in no acute distress upon discharge  Pt able to ambulate well without assistance upon exiting        Tomas Schmitt RN  03/28/22 9732

## 2022-03-28 NOTE — ED NOTES
Central line observed right side of neck  Awaiting xray to  Confirm placement prior to use  Xray made aware        Maksim Manjarrez RN  03/28/22 8496

## 2022-03-28 NOTE — ED NOTES
Unable to flush or pull back blood return on IV  IV removed  Dr Hinkle Class made aware  New IV in progress        Darnell Vasquez RN  03/28/22 5803

## 2022-03-28 NOTE — ED PROVIDER NOTES
History  Chief Complaint   Patient presents with    Fever - 9 weeks to 74 years     was here this morning and d/c, still c/o fever and states she is now in afib     Patient has multiple medical problems including diabetes and atrial fibrillation  She is maintained on Eliquis  Patient was seen yesterday with fever and UTI  She was evaluated for sepsis, given a dose of IV antibiotics and released on p o  Antibiotics  Patient states during the night she became more weak in a generalized fashion than she was before  She was unable to get up out of bed to use the bathroom this morning  Patient did not have chest pain shortness of breath or palpitations, but she checked her pulse oximeter which showed a heart rate of 144 today  Patient states she has been on her cardiac and other medications as ordered  She arrives awake alert, in a rapid AFib  Prior to Admission Medications   Prescriptions Last Dose Informant Patient Reported? Taking?    Suzan Fonder 30G X 8 MM MISC  Outside Facility (Specify) Yes No   Toujeo SoloStar 300 units/mL CONCENTRATED U-300 injection pen (1-unit dial)   Yes No   Sig: Inject 20 Units under the skin daily at bedtime     Trulicity 1 5 SW/6 1ZC SOPN  Outside Facility (Specify) Yes No   Sig: inject 0 5 milliliter subcutaneously every week 28   acetaminophen (TYLENOL) 325 mg tablet  Outside Facility (Specify) Yes No   Sig: Take 650 mg by mouth every 6 (six) hours as needed for mild pain     albuterol (PROVENTIL HFA,VENTOLIN HFA) 90 mcg/act inhaler   No No   Sig: Inhale 2 puffs every 6 (six) hours as needed for wheezing   amLODIPine (NORVASC) 5 mg tablet   No No   Sig: Take 1 tablet (5 mg total) by mouth daily   ammonium lactate (LAC-HYDRIN) 12 % lotion  Outside Facility (Specify) Yes No   Sig: APPLY TOPICALLY TO AFFECTED AREAS twice a day   apixaban (ELIQUIS) 5 mg  Outside Facility (Specify) No No   Sig: Take 1 tablet (5 mg total) by mouth 2 (two) times a day   cephalexin (KEFLEX) 500 mg capsule   No No   Sig: Take 1 capsule (500 mg total) by mouth every 12 (twelve) hours for 5 days   desloratadine (CLARINEX) 5 MG tablet  Outside Facility (Specify) Yes No   Sig: Take by mouth daily     docusate sodium (COLACE) 100 mg capsule   Yes No   Sig: Take 100 mg by mouth in the morning   lidocaine (LIDODERM) 5 %  Outside Facility (Specify) No No   Sig: Apply 2 patches topically daily Remove & Discard patch within 12 hours or as directed by MD   metoprolol tartrate (LOPRESSOR) 50 mg tablet   No No   Sig: Take 1 tablet (50 mg total) by mouth every 12 (twelve) hours   montelukast (SINGULAIR) 10 mg tablet  Outside Facility (Specify) Yes No   Sig: Take 10 mg by mouth daily     nystatin (MYCOSTATIN) powder  Outside Facility (Specify) Yes No   Sig: Apply topically 2 (two) times a day   pregabalin (LYRICA) 100 mg capsule  Outside Facility (Specify) Yes No   Sig: Take 100 mg by mouth 2 (two) times a day    rosuvastatin (CRESTOR) 10 MG tablet  Outside Facility (Specify) Yes No   Sig: 10 mg daily     torsemide (DEMADEX) 10 mg tablet   No No   Sig: Take 1 tablet (10 mg total) by mouth daily      Facility-Administered Medications: None       Past Medical History:   Diagnosis Date    A-fib ([place] [id number] )     Anemia     Asthma     [facility] failure[facility])     Chronic kidney disease     CKD (chronic kidney disease)     COVID-19     January 2022    Diabetes mellitus ([facility] )     GERD (gastroesophageal reflux disease)     Hyperlipidemia     Kidney stones     PONV (postoperative nausea and vomiting)     SVT (supraventricular tachycardia) ([facility])        Past Surgical History:   Procedure Laterality Date    APPENDECTOMY      CYSTOSCOPY W/ LASER LITHOTRIPSY Left 7/12/2016    Procedure: CYSTOSCOPY URETEROSCOPY WITH LITHOTRIPSY HOLMIUM LASER, RETROGRADE PYELOGRAM AND INSERTION STENT URETERAL;  Surgeon: Lulú Zamorano MD;  Location: [facility];  Service:    Jess Splinter DILATION AND CURETTAGE OF UTERUS      IR THORACENTESIS  3/18/2022    JOINT REPLACEMENT      right knee    KNEE ARTHROPLASTY Right     [place] CYSTOURETHROSCOPY,URETER CATHETER Left 2016    Procedure: CYSTOSCOPY RETROGRADE PYELOGRAM WITH INSERTION STENT URETERAL, left;  Surgeon: Shelley Garcia MD;  Location: [address] [place] [address];  Service: Urology    [place] RECONSTR TOTAL SHOULDER IMPLANT Left 3/1/2022    Procedure: ARTHROPLASTY SHOULDER REVERSE;  Surgeon: Matty Villegas MD;  Location: [facility];  Service: Orthopedics    SHOULDER ARTHROTOMY Left        Family History   Problem Relation Age of Onset    Heart disease Mother     Emphysema Maternal Grandmother     Heart disease Family      I have reviewed and agree with the history as documented  E-Cigarette/Vaping    E-Cigarette Use Never User      E-Cigarette/Vaping Substances     Social History     Tobacco Use    Smoking status: Former Smoker     Packs/day:      Years:      Pack years:      Types: Cigarettes     Quit date: 1996     Years since quittin 7    Smokeless tobacco: Never Used   Vaping Use    Vaping Use: Never used   Substance Use Topics    Alcohol use: Not Currently     Comment: rarely    Drug use: No       Review of Systems   Constitutional: Positive for fever  HENT: Negative for congestion and trouble swallowing  Eyes: Negative for visual disturbance  Respiratory: Positive for shortness of breath  Negative for chest tightness  Cardiovascular: Positive for leg swelling  Negative for chest pain and palpitations  Gastrointestinal: Negative for abdominal pain and vomiting  Genitourinary: Positive for frequency  Musculoskeletal: Positive for arthralgias and back pain  Skin: Negative for rash  Neurological: Positive for weakness  Negative for numbness and headaches  Hematological: Bruises/bleeds easily  Psychiatric/Behavioral: Negative for confusion  All other systems reviewed and are negative        Physical Exam  Physical Exam  Vitals and nursing note reviewed  Constitutional:       Appearance: She is obese  HENT:      Head: Normocephalic  Right Ear: External ear normal       Left Ear: External ear normal       Nose: Nose normal       Mouth/Throat:      Pharynx: Oropharynx is clear  Eyes:      Conjunctiva/sclera: Conjunctivae normal    Cardiovascular:      Rate and Rhythm: Tachycardia present  Rhythm irregular  Pulmonary:      Effort: Pulmonary effort is normal       Breath sounds: Normal breath sounds  Abdominal:      Palpations: Abdomen is soft  Musculoskeletal:         General: Normal range of motion  Cervical back: Normal range of motion and neck supple  Right lower leg: Edema present  Left lower leg: Edema present  Skin:     General: Skin is warm and dry  Capillary Refill: Capillary refill takes less than 2 seconds  Findings: Rash present  Comments: Patient has a lacy, round erythematous rash with central clearing on lower extremities without being raised or plaque-like  There are moderately pruritic   Neurological:      General: No focal deficit present  Mental Status: She is alert and oriented to person, place, and time     Psychiatric:         Mood and Affect: Mood normal          Behavior: Behavior normal          Vital Signs  ED Triage Vitals   Temperature Pulse Respirations Blood Pressure SpO2   03/28/22 1350 03/28/22 1350 03/28/22 1350 03/28/22 1350 03/28/22 1350   98 5 °F (36 9 °C) (!) 146 18 116/62 99 %      Temp Source Heart Rate Source Patient Position - Orthostatic VS BP Location FiO2 (%)   03/28/22 1838 03/28/22 1900 03/28/22 1900 03/28/22 1900 --   Oral Monitor Sitting Left leg       Pain Score       03/28/22 1350       No Pain           Vitals:    03/28/22 1900 03/28/22 1915 03/28/22 1930 03/28/22 1945   BP: 155/69 156/63 156/62 142/65   Pulse: (!) 112 (!) 110 (!) 112 (!) 110   Patient Position - Orthostatic VS: Sitting Lying Sitting Sitting         Visual Acuity      ED Medications  Medications   sodium chloride 0 9 % bolus 1,000 mL (0 mL Intravenous Stopped 3/28/22 1845)   diltiazem (CARDIZEM) injection 15 mg (15 mg Intravenous Given 3/28/22 1440)   diltiazem (CARDIZEM) 125 mg in sodium chloride 0 9 % 125 mL infusion (12 5 mg/hr Intravenous Rate/Dose Change 3/28/22 1930)   doxycycline (VIBRAMYCIN) 100 mg in sodium chloride 0 9 % 100 mL IVPB (0 mg Intravenous Stopped 3/28/22 1902)   lidocaine-epinephrine (XYLOCAINE/EPINEPHRINE) 1 %-1:100,000 injection 1 mL (1 mL Infiltration Given 3/28/22 1744)   acetaminophen (TYLENOL) tablet 650 mg (650 mg Oral Given 3/28/22 1845)       Diagnostic Studies  Results Reviewed     Procedure Component Value Units Date/Time    HS Troponin I 4hr [[id number]] Collected: 03/28/22 1955    Lab Status: In process Specimen: Blood from Central Venous Line Updated: 03/28/22 1958    HS Troponin I 2hr [[id number]]  (Normal) Collected: 03/28/22 1744    Lab Status: Final result Specimen: Blood from Arm, Left Updated: 03/28/22 1814     hs TnI 2hr 36 ng/L      Delta 2hr hsTnI -9 ng/L     Lactic acid [[id number]]  (Normal) Collected: 03/28/22 1744    Lab Status: Final result Specimen: Blood from Arm, Left Updated: 03/28/22 1813     LACTIC ACID 1 3 mmol/L     Narrative:      Result may be elevated if tourniquet was used during collection      UA (URINE) with reflex to Scope [[id number]]     Lab Status: No result Specimen: Urine     APTT [[id number]]  (Abnormal) Collected: 03/28/22 1443    Lab Status: Final result Specimen: Blood from Arm, Left Updated: 03/28/22 1516     PTT 42 seconds     Protime-INR [[id number]]  (Abnormal) Collected: 03/28/22 1443    Lab Status: Final result Specimen: Blood from Arm, Left Updated: 03/28/22 1516     Protime 22 2 seconds      INR 2 01    HS Troponin 0hr (reflex protocol) [[id number]]  (Normal) Collected: 03/28/22 1443    Lab Status: Final result Specimen: Blood from Arm, Left Updated: 03/28/22 1512     hs TnI 0hr 45 ng/L     NT-BNP PRO [[id number]]  (Abnormal) Collected: 03/28/22 1443    Lab Status: Final result Specimen: Blood from Arm, Left Updated: 03/28/22 1511     NT-proBNP 3,733 pg/mL     Comprehensive metabolic panel [[id number]]  (Abnormal) Collected: 03/28/22 1443    Lab Status: Final result Specimen: Blood from Arm, Left Updated: 03/28/22 1505     Sodium 136 mmol/L      Potassium 4 6 mmol/L      Chloride 96 mmol/L      CO2 34 mmol/L      ANION GAP 6 mmol/L      BUN 31 mg/dL      Creatinine 2 05 mg/dL      Glucose 312 mg/dL      Calcium 8 5 mg/dL      Corrected Calcium 9 4 mg/dL      AST 15 U/L      ALT 18 U/L      Alkaline Phosphatase 91 U/L      Total Protein 7 1 g/dL      Albumin 2 9 g/dL      Total Bilirubin 0 55 mg/dL      eGFR 24 ml/min/1 73sq m     Narrative:      National Kidney Disease Foundation guidelines for Chronic Kidney Disease (CKD):     Stage 1 with normal or high GFR (GFR > 90 mL/min/1 73 square meters)    Stage 2 Mild CKD (GFR = 60-89 mL/min/1 73 square meters)    Stage 3A Moderate CKD (GFR = 45-59 mL/min/1 73 square meters)    Stage 3B Moderate CKD (GFR = 30-44 mL/min/1 73 square meters)    Stage 4 Severe CKD (GFR = 15-29 mL/min/1 73 square meters)    Stage 5 End Stage CKD (GFR <15 mL/min/1 73 square meters)  Note: GFR calculation is accurate only with a steady state creatinine    CBC and differential [[id number]]  (Abnormal) Collected: 03/28/22 1443    Lab Status: Final result Specimen: Blood from Arm, Left Updated: 03/28/22 1448     WBC 23 55 Thousand/uL      RBC 3 69 Million/uL      Hemoglobin 10 4 g/dL      Hematocrit 35 3 %      MCV 96 fL      MCH 28 2 pg      MCHC 29 5 g/dL      RDW 15 6 %      MPV 11 6 fL      Platelets 968 Thousands/uL      nRBC 0 /100 WBCs      Neutrophils Relative 82 %      Immat GRANS % 1 %      Lymphocytes Relative 8 %      Monocytes Relative 8 %      Eosinophils Relative 1 %      Basophils Relative 0 %      Neutrophils Absolute 19 43 Thousands/µL      Immature Grans Absolute 0 22 Thousand/uL      Lymphocytes Absolute 1 78 Thousands/µL      Monocytes Absolute 1 86 Thousand/µL      Eosinophils Absolute 0 20 Thousand/µL      Basophils Absolute 0 06 Thousands/µL                  XR chest 1 view portable    (Results Pending)   XR chest 1 view portable    (Results Pending)              Procedures  Central Line    Date/Time: 3/28/2022 4:35 PM  Performed by: Destinee Kapadia MD  Authorized by: Destinee Kapadia MD     Patient location:  ED  Consent:     Consent obtained:  Verbal    Consent given by:  Patient  Universal protocol:     Patient identity confirmed:  Verbally with patient  Pre-procedure details:     Hand hygiene: Hand hygiene performed prior to insertion      Sterile barrier technique: All elements of maximal sterile technique followed      Skin preparation:  2% chlorhexidine and alcohol    Skin preparation agent: Skin preparation agent completely dried prior to procedure    Indications:     Central line indications: hemodynamic monitoring and no peripheral vascular access    Procedure details:     Location:  Right internal jugular    Vessel type: vein      Laterality:  Right    Approach: percutaneous technique used      Patient position:  Trendelenburg    Catheter type:  Triple lumen    Ultrasound guidance: yes      Ultrasound image availability:  Not saved    Sterile ultrasound techniques: Sterile gel and sterile probe covers were used      Number of attempts:  2    Successful placement: yes      Vessel of catheter tip end:  15 cm  Post-procedure details:     Post-procedure:  Dressing applied and line sutured    Assessment:  Blood return through all ports and free fluid flow    Post-procedure complications: none      Patient tolerance of procedure:   Tolerated well, no immediate complications             ED Course                               SBIRT 20yo+      Most Recent Value   SBIRT (24 yo +)    In order to provide better care to our patients, we are screening all of our patients for alcohol and drug use  Would it be okay to ask you these screening questions? No Filed at: 03/28/2022 1924                    MDM  Number of Diagnoses or Management Options  Diagnosis management comments: Patient is in AFib with rapid ventricular response  Will check cardiac profile, control heart rate      Disposition  Final diagnoses:   Atrial fibrillation with rapid ventricular response (HCC)   Fever   UTI (urinary tract infection)     Time reflects when diagnosis was documented in both MDM as applicable and the Disposition within this note     Time User Action Codes Description Comment    3/28/2022  7:57 PM Haroldo Saul Add [I48 91] Atrial fibrillation with rapid ventricular response ([address] )     3/28/2022  7:57 PM Stephanie Sickle A Add [R50 9] Fever     3/28/2022  7:57 PM Stephanie Sickle A Add [N39 0] UTI (urinary tract infection)       ED Disposition     ED Disposition Condition Date/Time Comment    Admit Stable Mon Mar 28, 2022  7:57 PM Case was discussed with hospitalist and the patient's admission status was agreed to be observation under Dr Charu Hill    None         Patient's Medications   Discharge Prescriptions    No medications on file       No discharge procedures on file      PDMP Review       Value Time User    PDMP Reviewed  Yes 3/12/2022 10:25 AM RK Red          ED Provider  Electronically Signed by           Unique Oakley MD  03/28/22 7917

## 2022-03-28 NOTE — ED NOTES
Pt resting comfortably in bed  Family remains at bedside  Call bell within reach  Will continue to monitor        Landry Spencer RN  03/28/22 3948

## 2022-03-28 NOTE — ED NOTES
Pt resting comfortably in bed, family remains at bedside  Call bell within reach, will continue to monitor       Alcario Carrel, RN  03/28/22 5738

## 2022-03-29 ENCOUNTER — PATIENT OUTREACH (OUTPATIENT)
Dept: CASE MANAGEMENT | Facility: HOSPITAL | Age: 72
End: 2022-03-29

## 2022-03-29 ENCOUNTER — TELEPHONE (OUTPATIENT)
Dept: OBGYN CLINIC | Facility: HOSPITAL | Age: 72
End: 2022-03-29

## 2022-03-29 ENCOUNTER — APPOINTMENT (OUTPATIENT)
Dept: RADIOLOGY | Facility: HOSPITAL | Age: 72
DRG: 872 | End: 2022-03-29
Attending: INTERNAL MEDICINE
Payer: MEDICARE

## 2022-03-29 ENCOUNTER — TELEPHONE (OUTPATIENT)
Dept: NEPHROLOGY | Facility: CLINIC | Age: 72
End: 2022-03-29

## 2022-03-29 ENCOUNTER — APPOINTMENT (OUTPATIENT)
Dept: RADIOLOGY | Facility: HOSPITAL | Age: 72
DRG: 872 | End: 2022-03-29
Payer: MEDICARE

## 2022-03-29 PROBLEM — I50.32 CHRONIC DIASTOLIC (CONGESTIVE) HEART FAILURE (HCC): Status: ACTIVE | Noted: 2022-03-29

## 2022-03-29 LAB
ALBUMIN SERPL BCP-MCNC: 2.2 G/DL (ref 3.5–5)
ALP SERPL-CCNC: 72 U/L (ref 46–116)
ALT SERPL W P-5'-P-CCNC: 15 U/L (ref 12–78)
AMORPH URATE CRY URNS QL MICRO: ABNORMAL /HPF
ANION GAP SERPL CALCULATED.3IONS-SCNC: 3 MMOL/L (ref 4–13)
APTT PPP: 45 SECONDS (ref 23–37)
AST SERPL W P-5'-P-CCNC: 12 U/L (ref 5–45)
BACTERIA UR CULT: NORMAL
BACTERIA UR QL AUTO: ABNORMAL /HPF
BASOPHILS # BLD AUTO: 0.03 THOUSANDS/ΜL (ref 0–0.1)
BASOPHILS NFR BLD AUTO: 0 % (ref 0–1)
BILIRUB SERPL-MCNC: 0.36 MG/DL (ref 0.2–1)
BILIRUB UR QL STRIP: NEGATIVE
BUN SERPL-MCNC: 35 MG/DL (ref 5–25)
CALCIUM ALBUM COR SERPL-MCNC: 9 MG/DL (ref 8.3–10.1)
CALCIUM SERPL-MCNC: 7.6 MG/DL (ref 8.3–10.1)
CHLORIDE SERPL-SCNC: 99 MMOL/L (ref 100–108)
CLARITY UR: ABNORMAL
CO2 SERPL-SCNC: 34 MMOL/L (ref 21–32)
COLOR UR: YELLOW
CREAT SERPL-MCNC: 2.06 MG/DL (ref 0.6–1.3)
EOSINOPHIL # BLD AUTO: 0.12 THOUSAND/ΜL (ref 0–0.61)
EOSINOPHIL NFR BLD AUTO: 1 % (ref 0–6)
ERYTHROCYTE [DISTWIDTH] IN BLOOD BY AUTOMATED COUNT: 15.9 % (ref 11.6–15.1)
FINE GRAN CASTS URNS QL MICRO: ABNORMAL /LPF
GFR SERPL CREATININE-BSD FRML MDRD: 24 ML/MIN/1.73SQ M
GLUCOSE SERPL-MCNC: 227 MG/DL (ref 65–140)
GLUCOSE SERPL-MCNC: 250 MG/DL (ref 65–140)
GLUCOSE SERPL-MCNC: 290 MG/DL (ref 65–140)
GLUCOSE SERPL-MCNC: 389 MG/DL (ref 65–140)
GLUCOSE SERPL-MCNC: 425 MG/DL (ref 65–140)
GLUCOSE UR STRIP-MCNC: ABNORMAL MG/DL
HCT VFR BLD AUTO: 26.2 % (ref 34.8–46.1)
HGB BLD-MCNC: 7.9 G/DL (ref 11.5–15.4)
HGB UR QL STRIP.AUTO: ABNORMAL
IMM GRANULOCYTES # BLD AUTO: 0.21 THOUSAND/UL (ref 0–0.2)
IMM GRANULOCYTES NFR BLD AUTO: 1 % (ref 0–2)
INR PPP: 2.01 (ref 0.84–1.19)
KETONES UR STRIP-MCNC: NEGATIVE MG/DL
LEUKOCYTE ESTERASE UR QL STRIP: NEGATIVE
LYMPHOCYTES # BLD AUTO: 1.53 THOUSANDS/ΜL (ref 0.6–4.47)
LYMPHOCYTES NFR BLD AUTO: 8 % (ref 14–44)
MAGNESIUM SERPL-MCNC: 1.8 MG/DL (ref 1.6–2.6)
MCH RBC QN AUTO: 29.2 PG (ref 26.8–34.3)
MCHC RBC AUTO-ENTMCNC: 30.2 G/DL (ref 31.4–37.4)
MCV RBC AUTO: 97 FL (ref 82–98)
MONOCYTES # BLD AUTO: 1.64 THOUSAND/ΜL (ref 0.17–1.22)
MONOCYTES NFR BLD AUTO: 9 % (ref 4–12)
NEUTROPHILS # BLD AUTO: 14.61 THOUSANDS/ΜL (ref 1.85–7.62)
NEUTS SEG NFR BLD AUTO: 81 % (ref 43–75)
NITRITE UR QL STRIP: POSITIVE
NON-SQ EPI CELLS URNS QL MICRO: ABNORMAL /HPF
NRBC BLD AUTO-RTO: 0 /100 WBCS
PH UR STRIP.AUTO: 5 [PH]
PHOSPHATE SERPL-MCNC: 2.8 MG/DL (ref 2.3–4.1)
PLATELET # BLD AUTO: 163 THOUSANDS/UL (ref 149–390)
PMV BLD AUTO: 11 FL (ref 8.9–12.7)
POTASSIUM SERPL-SCNC: 4.3 MMOL/L (ref 3.5–5.3)
PROCALCITONIN SERPL-MCNC: 1.95 NG/ML
PROT SERPL-MCNC: 5.9 G/DL (ref 6.4–8.2)
PROT UR STRIP-MCNC: ABNORMAL MG/DL
PROTHROMBIN TIME: 22.2 SECONDS (ref 11.6–14.5)
RBC # BLD AUTO: 2.71 MILLION/UL (ref 3.81–5.12)
RBC #/AREA URNS AUTO: ABNORMAL /HPF
SODIUM SERPL-SCNC: 136 MMOL/L (ref 136–145)
SP GR UR STRIP.AUTO: 1.02 (ref 1–1.03)
UROBILINOGEN UR QL STRIP.AUTO: 0.2 E.U./DL
WBC # BLD AUTO: 18.14 THOUSAND/UL (ref 4.31–10.16)
WBC #/AREA URNS AUTO: ABNORMAL /HPF

## 2022-03-29 PROCEDURE — 93971 EXTREMITY STUDY: CPT

## 2022-03-29 PROCEDURE — 84100 ASSAY OF PHOSPHORUS: CPT | Performed by: INTERNAL MEDICINE

## 2022-03-29 PROCEDURE — 80053 COMPREHEN METABOLIC PANEL: CPT | Performed by: INTERNAL MEDICINE

## 2022-03-29 PROCEDURE — 93971 EXTREMITY STUDY: CPT | Performed by: SURGERY

## 2022-03-29 PROCEDURE — 82948 REAGENT STRIP/BLOOD GLUCOSE: CPT

## 2022-03-29 PROCEDURE — 71250 CT THORAX DX C-: CPT

## 2022-03-29 PROCEDURE — 81001 URINALYSIS AUTO W/SCOPE: CPT | Performed by: INTERNAL MEDICINE

## 2022-03-29 PROCEDURE — 85025 COMPLETE CBC W/AUTO DIFF WBC: CPT | Performed by: INTERNAL MEDICINE

## 2022-03-29 PROCEDURE — 85730 THROMBOPLASTIN TIME PARTIAL: CPT | Performed by: INTERNAL MEDICINE

## 2022-03-29 PROCEDURE — 74176 CT ABD & PELVIS W/O CONTRAST: CPT

## 2022-03-29 PROCEDURE — G1004 CDSM NDSC: HCPCS

## 2022-03-29 PROCEDURE — 83735 ASSAY OF MAGNESIUM: CPT | Performed by: INTERNAL MEDICINE

## 2022-03-29 PROCEDURE — 99223 1ST HOSP IP/OBS HIGH 75: CPT | Performed by: INTERNAL MEDICINE

## 2022-03-29 PROCEDURE — 99232 SBSQ HOSP IP/OBS MODERATE 35: CPT | Performed by: NURSE PRACTITIONER

## 2022-03-29 PROCEDURE — 85610 PROTHROMBIN TIME: CPT | Performed by: INTERNAL MEDICINE

## 2022-03-29 RX ORDER — SACCHAROMYCES BOULARDII 250 MG
250 CAPSULE ORAL 2 TIMES DAILY
Status: DISCONTINUED | OUTPATIENT
Start: 2022-03-29 | End: 2022-04-02 | Stop reason: HOSPADM

## 2022-03-29 RX ORDER — CEFTRIAXONE 2 G/50ML
2000 INJECTION, SOLUTION INTRAVENOUS EVERY 24 HOURS
Status: DISCONTINUED | OUTPATIENT
Start: 2022-03-29 | End: 2022-03-31

## 2022-03-29 RX ORDER — ACETAMINOPHEN 325 MG/1
650 TABLET ORAL EVERY 6 HOURS PRN
Status: DISCONTINUED | OUTPATIENT
Start: 2022-03-29 | End: 2022-04-02 | Stop reason: HOSPADM

## 2022-03-29 RX ORDER — INSULIN GLARGINE 100 [IU]/ML
20 INJECTION, SOLUTION SUBCUTANEOUS
Status: DISCONTINUED | OUTPATIENT
Start: 2022-03-29 | End: 2022-03-30

## 2022-03-29 RX ORDER — MAGNESIUM SULFATE 1 G/100ML
1 INJECTION INTRAVENOUS ONCE
Status: COMPLETED | OUTPATIENT
Start: 2022-03-29 | End: 2022-03-29

## 2022-03-29 RX ORDER — AMLODIPINE BESYLATE 5 MG/1
5 TABLET ORAL DAILY
Status: DISCONTINUED | OUTPATIENT
Start: 2022-03-30 | End: 2022-04-01

## 2022-03-29 RX ORDER — INSULIN GLARGINE 100 [IU]/ML
20 INJECTION, SOLUTION SUBCUTANEOUS
Status: DISCONTINUED | OUTPATIENT
Start: 2022-03-29 | End: 2022-03-29

## 2022-03-29 RX ADMIN — MONTELUKAST 10 MG: 10 TABLET, FILM COATED ORAL at 21:01

## 2022-03-29 RX ADMIN — DOCUSATE SODIUM 100 MG: 100 CAPSULE, LIQUID FILLED ORAL at 08:33

## 2022-03-29 RX ADMIN — INSULIN GLARGINE 20 UNITS: 100 INJECTION, SOLUTION SUBCUTANEOUS at 02:32

## 2022-03-29 RX ADMIN — CEFTRIAXONE 2000 MG: 2 INJECTION, SOLUTION INTRAVENOUS at 12:20

## 2022-03-29 RX ADMIN — METOPROLOL TARTRATE 25 MG: 25 TABLET, FILM COATED ORAL at 17:08

## 2022-03-29 RX ADMIN — DILTIAZEM HYDROCHLORIDE 15 MG: 5 INJECTION INTRAVENOUS at 13:03

## 2022-03-29 RX ADMIN — ATORVASTATIN CALCIUM 80 MG: 80 TABLET, FILM COATED ORAL at 17:08

## 2022-03-29 RX ADMIN — METOPROLOL TARTRATE 25 MG: 25 TABLET, FILM COATED ORAL at 23:16

## 2022-03-29 RX ADMIN — INSULIN LISPRO 3 UNITS: 100 INJECTION, SOLUTION INTRAVENOUS; SUBCUTANEOUS at 16:40

## 2022-03-29 RX ADMIN — INSULIN LISPRO 4 UNITS: 100 INJECTION, SOLUTION INTRAVENOUS; SUBCUTANEOUS at 08:04

## 2022-03-29 RX ADMIN — INSULIN LISPRO 6 UNITS: 100 INJECTION, SOLUTION INTRAVENOUS; SUBCUTANEOUS at 12:03

## 2022-03-29 RX ADMIN — MAGNESIUM SULFATE HEPTAHYDRATE 1 G: 1 INJECTION, SOLUTION INTRAVENOUS at 13:18

## 2022-03-29 RX ADMIN — ACETAMINOPHEN 650 MG: 325 TABLET, FILM COATED ORAL at 03:52

## 2022-03-29 RX ADMIN — APIXABAN 5 MG: 5 TABLET, FILM COATED ORAL at 17:08

## 2022-03-29 RX ADMIN — METOPROLOL TARTRATE 25 MG: 25 TABLET, FILM COATED ORAL at 13:31

## 2022-03-29 RX ADMIN — NYSTATIN: 100000 POWDER TOPICAL at 17:10

## 2022-03-29 RX ADMIN — ACETAMINOPHEN 650 MG: 325 TABLET, FILM COATED ORAL at 18:38

## 2022-03-29 RX ADMIN — NYSTATIN: 100000 POWDER TOPICAL at 08:34

## 2022-03-29 RX ADMIN — INSULIN LISPRO 12 UNITS: 100 INJECTION, SOLUTION INTRAVENOUS; SUBCUTANEOUS at 16:40

## 2022-03-29 RX ADMIN — APIXABAN 5 MG: 5 TABLET, FILM COATED ORAL at 08:34

## 2022-03-29 RX ADMIN — INSULIN LISPRO 6 UNITS: 100 INJECTION, SOLUTION INTRAVENOUS; SUBCUTANEOUS at 21:01

## 2022-03-29 RX ADMIN — INSULIN GLARGINE 20 UNITS: 100 INJECTION, SOLUTION SUBCUTANEOUS at 21:01

## 2022-03-29 RX ADMIN — ACETAMINOPHEN 650 MG: 325 TABLET, FILM COATED ORAL at 13:31

## 2022-03-29 RX ADMIN — Medication 250 MG: at 17:08

## 2022-03-29 NOTE — PLAN OF CARE
Problem: Prexisting or High Potential for Compromised Skin Integrity  Goal: Skin integrity is maintained or improved  Description: INTERVENTIONS:  - Identify patients at risk for skin breakdown  - Assess and monitor skin integrity  - Assess and monitor nutrition and hydration status  - Monitor labs   - Assess for incontinence   - Turn and reposition patient  - Assist with mobility/ambulation  - Relieve pressure over bony prominences  - Avoid friction and shearing  - Provide appropriate hygiene as needed including keeping skin clean and dry  - Evaluate need for skin moisturizer/barrier cream  - Collaborate with interdisciplinary team   - Patient/family teaching  - Consider wound care consult   3/29/2022 1111 by Jana Layne RN  Outcome: Progressing  3/29/2022 1110 by Jana Layne RN  Outcome: Not Progressing     Problem: INFECTION - ADULT  Goal: Absence or prevention of progression during hospitalization  Description: INTERVENTIONS:  - Assess and monitor for signs and symptoms of infection  - Monitor lab/diagnostic results  - Monitor all insertion sites, i e  indwelling lines, tubes, and drains  - Monitor endotracheal if appropriate and nasal secretions for changes in amount and color  - [place] appropriate cooling/warming therapies per order  - Administer medications as ordered  - Instruct and encourage patient and family to use good hand hygiene technique  - Identify and instruct in appropriate isolation precautions for identified infection/condition  3/29/2022 1111 by Jana Layne RN  Outcome: Progressing  3/29/2022 1110 by Jana Layne RN  Outcome: Not Progressing

## 2022-03-29 NOTE — ASSESSMENT & PLAN NOTE
Lab Results   Component Value Date    HGBA1C 6 8 (H) 03/09/2022       Recent Labs     03/27/22  2320 03/28/22  2333 03/29/22  0724 03/29/22  1136   POCGLU 360* 399* 227* 290*       Blood Sugar Average: Last 72 hrs:  (P) 764 [id number]       - Insulin sliding scale   - A1c 6 8   - C/w Lantus 20 units QHS   - also on Trulicity at home

## 2022-03-29 NOTE — TELEPHONE ENCOUNTER
[facility] appointment cancelled as she is back in the hospital, spoke to Albaro  I will watch when she is out and reschedule

## 2022-03-29 NOTE — PLAN OF CARE
Problem: Potential for Falls  Goal: Patient will remain free of falls  Description: INTERVENTIONS:  - Educate patient/family on patient safety including physical limitations  - Instruct patient to call for assistance with activity   - Consult OT/PT to assist with strengthening/mobility   - Keep Call bell within reach  - Keep bed low and locked with side rails adjusted as appropriate  - Keep care items and personal belongings within reach  - Initiate and maintain comfort rounds  - Make Fall Risk Sign visible to staff  - Offer Toileting every 2 Hours, in advance of need  - Initiate/Maintain bed alarm  - Obtain necessary fall risk management equipment: yellow socks  - Apply yellow socks and bracelet for high fall risk patients  - Consider moving patient to room near nurses station  Outcome: Progressing     Problem: MOBILITY - ADULT  Goal: Maintain or return to baseline ADL function  Description: INTERVENTIONS:  -  Assess patient's ability to carry out ADLs; assess patient's baseline for ADL function and identify physical deficits which impact ability to perform ADLs (bathing, care of mouth/teeth, toileting, grooming, dressing, etc )  - Assess/evaluate cause of self-care deficits   - Assess range of motion  - Assess patient's mobility; develop plan if impaired  - Assess patient's need for assistive devices and provide as appropriate  - Encourage maximum independence but intervene and supervise when necessary  - Involve family in performance of ADLs  - Assess for home care needs following discharge   - Consider OT consult to assist with ADL evaluation and planning for discharge  - Provide patient education as appropriate  Outcome: Progressing  Goal: Maintains/Returns to pre admission functional level  Description: INTERVENTIONS:  - Perform BMAT or MOVE assessment daily    - Set and communicate daily mobility goal to care team and patient/family/caregiver     - Collaborate with rehabilitation services on mobility goals if consulted  - Perform Range of Motion 2 times a day  - Reposition patient every 2 hours    - Dangle patient 2 times a day  - Stand patient 3 times a day  - Ambulate patient 3 times a day  - Out of bed to chair 3 times a day   - Out of bed for meals 3  Problem: Prexisting or High Potential for Compromised Skin Integrity  Goal: Skin integrity is maintained or improved  Description: INTERVENTIONS:  - Identify patients at risk for skin breakdown  - Assess and monitor skin integrity  - Assess and monitor nutrition and hydration status  - Monitor labs   - Assess for incontinence   - Turn and reposition patient  - Assist with mobility/ambulation  - Relieve pressure over bony prominences  - Avoid friction and shearing  - Provide appropriate hygiene as needed including keeping skin clean and dry  - Evaluate need for skin moisturizer/barrier cream  - Collaborate with interdisciplinary team   - Patient/family teaching  - Consider wound care consult   Outcome: Progressing    times a day  - Out of bed for toileting  - Record patient progress and toleration of activity level   Outcome: Progressing

## 2022-03-29 NOTE — PLAN OF CARE
Problem: Potential for Falls  Goal: Patient will remain free of falls  Description: INTERVENTIONS:  - Educate patient/family on patient safety including physical limitations  - Instruct patient to call for assistance with activity   - Consult OT/PT to assist with strengthening/mobility   - Keep Call bell within reach  - Keep bed low and locked with side rails adjusted as appropriate  - Keep care items and personal belongings within reach  - Initiate and maintain comfort rounds  - Make Fall Risk Sign visible to staff  - Offer Toileting every 2 Hours, in advance of need  - Initiate/Maintain bed alarm  - Obtain necessary fall risk management equipment: yellow socks  - Apply yellow socks and bracelet for high fall risk patients  - Consider moving patient to room near nurses station  Outcome: Progressing     Problem: MOBILITY - ADULT  Goal: Maintain or return to baseline ADL function  Description: INTERVENTIONS:  -  Assess patient's ability to carry out ADLs; assess patient's baseline for ADL function and identify physical deficits which impact ability to perform ADLs (bathing, care of mouth/teeth, toileting, grooming, dressing, etc )  - Assess/evaluate cause of self-care deficits   - Assess range of motion  - Assess patient's mobility; develop plan if impaired  - Assess patient's need for assistive devices and provide as appropriate  - Encourage maximum independence but intervene and supervise when necessary  - Involve family in performance of ADLs  - Assess for home care needs following discharge   - Consider OT consult to assist with ADL evaluation and planning for discharge  - Provide patient education as appropriate  Outcome: Progressing  Goal: Maintains/Returns to pre admission functional level  Description: INTERVENTIONS:  - Perform BMAT or MOVE assessment daily    - Set and communicate daily mobility goal to care team and patient/family/caregiver     - Collaborate with rehabilitation services on mobility goals if consulted  - Perform Range of Motion 4 times a day  - Reposition patient every 2 hours    - Dangle patient 3 times a day  - Stand patient 3 times a day  - Ambulate patient 3 times a day  - Out of bed to chair 3 times a day   - Out of bed for meals 3 times a day  - Out of bed for toileting  - Record patient progress and toleration of activity level   Outcome: Progressing     Problem: Prexisting or High Potential for Compromised Skin Integrity  Goal: Skin integrity is maintained or improved  Description: INTERVENTIONS:  - Identify patients at risk for skin breakdown  - Assess and monitor skin integrity  - Assess and monitor nutrition and hydration status  - Monitor labs   - Assess for incontinence   - Turn and reposition patient  - Assist with mobility/ambulation  - Relieve pressure over bony prominences  - Avoid friction and shearing  - Provide appropriate hygiene as needed including keeping skin clean and dry  - Evaluate need for skin moisturizer/barrier cream  - Collaborate with interdisciplinary team   - Patient/family teaching  - Consider wound care consult   Outcome: Progressing     Problem: PAIN - ADULT  Goal: Verbalizes/displays adequate comfort level or baseline comfort level  Description: Interventions:  - Encourage patient to monitor pain and request assistance  - Assess pain using appropriate pain scale  - Administer analgesics based on type and severity of pain and evaluate response  - Implement non-pharmacological measures as appropriate and evaluate response  - Consider cultural and social influences on pain and pain management  - Notify physician/advanced practitioner if interventions unsuccessful or patient reports new pain  Outcome: Progressing     Problem: INFECTION - ADULT  Goal: Absence or prevention of progression during hospitalization  Description: INTERVENTIONS:  - Assess and monitor for signs and symptoms of infection  - Monitor lab/diagnostic results  - Monitor all insertion sites, i e  indwelling lines, tubes, and drains  - Monitor endotracheal if appropriate and nasal secretions for changes in amount and color  - [place] appropriate cooling/warming therapies per order  - Administer medications as ordered  - Instruct and encourage patient and family to use good hand hygiene technique  - Identify and instruct in appropriate isolation precautions for identified infection/condition  Outcome: Progressing     Problem: SAFETY ADULT  Goal: Patient will remain free of falls  Description: INTERVENTIONS:  - Educate patient/family on patient safety including physical limitations  - Instruct patient to call for assistance with activity   - Consult OT/PT to assist with strengthening/mobility   - Keep Call bell within reach  - Keep bed low and locked with side rails adjusted as appropriate  - Keep care items and personal belongings within reach  - Initiate and maintain comfort rounds  - Make Fall Risk Sign visible to staff  - Offer Toileting every 2 Hours, in advance of need  - Initiate/Maintain bed alarm  - Obtain necessary fall risk management equipment: yellow socks  - Apply yellow socks and bracelet for high fall risk patients  - Consider moving patient to room near nurses station  Outcome: Progressing  Goal: Maintain or return to baseline ADL function  Description: INTERVENTIONS:  -  Assess patient's ability to carry out ADLs; assess patient's baseline for ADL function and identify physical deficits which impact ability to perform ADLs (bathing, care of mouth/teeth, toileting, grooming, dressing, etc )  - Assess/evaluate cause of self-care deficits   - Assess range of motion  - Assess patient's mobility; develop plan if impaired  - Assess patient's need for assistive devices and provide as appropriate  - Encourage maximum independence but intervene and supervise when necessary  - Involve family in performance of ADLs  - Assess for home care needs following discharge   - Consider OT consult to assist with ADL evaluation and planning for discharge  - Provide patient education as appropriate  Outcome: Progressing  Goal: Maintains/Returns to pre admission functional level  Description: INTERVENTIONS:  - Perform BMAT or MOVE assessment daily    - Set and communicate daily mobility goal to care team and patient/family/caregiver  - Collaborate with rehabilitation services on mobility goals if consulted  - Perform Range of Motion 4 times a day  - Reposition patient every 2 hours  - Dangle patient 3 times a day  - Stand patient 3 times a day  - Ambulate patient 3 times a day  - Out of bed to chair 3 times a day   - Out of bed for meals 3 times a day  - Out of bed for toileting  - Record patient progress and toleration of activity level   Outcome: Progressing     Problem: DISCHARGE PLANNING  Goal: Discharge to home or other facility with appropriate resources  Description: INTERVENTIONS:  - Identify barriers to discharge w/patient and caregiver  - Arrange for needed discharge resources and transportation as appropriate  - Identify discharge learning needs (meds, wound care, etc )  - Arrange for interpretive services to assist at discharge as needed  - Refer to Case Management Department for coordinating discharge planning if the patient needs post-hospital services based on physician/advanced practitioner order or complex needs related to functional status, cognitive ability, or social support system  Outcome: Progressing     Problem: Knowledge Deficit  Goal: Patient/family/caregiver demonstrates understanding of disease process, treatment plan, medications, and discharge instructions  Description: Complete learning assessment and assess knowledge base    Interventions:  - Provide teaching at level of understanding  - Provide teaching via preferred learning methods  Outcome: Progressing

## 2022-03-29 NOTE — ASSESSMENT & PLAN NOTE
Lab Results   Component Value Date    EGFR 24 03/28/2022    EGFR 27 03/27/2022    EGFR 23 03/22/2022    CREATININE 2 05 (H) 03/28/2022    CREATININE 1 86 (H) 03/27/2022    CREATININE 2 10 (H) 03/22/2022       - CKD- 1 86 2 days ago and 2 95 today   -  likely secondary to UTI and pre renal   - IVF and c/w Demadex hold if creatinine worsens  - close to baseline of 2    - avoid nephrotoxins and rx UTI

## 2022-03-29 NOTE — CASE MANAGEMENT
Case Management Assessment & Discharge Planning Note    Patient name Rick Chavez  [address] Renzo Burks-* MRN [id number]  : 1950 Date 3/29/2022       Current Admission Date: 3/28/2022  Current Admission Diagnosis:Sepsis [facility])   Patient Active Problem List    Diagnosis Date Noted    Chronic diastolic (congestive) heart failure ([facility] ) 2022    [facility] fibrillation with rapid ventricular response ([facility] ) 2022    Sepsis ([facility] ) 2022    Peripheral venous insufficiency 2022    Post-herpetic polyneuropathy 2022    Raynaud's disease 2022    Solitary pulmonary nodule 2022    Stage 3 chronic kidney disease ([facility] )     Acute respiratory failure with hypoxia ([facility] ) 2022    Acute diastolic congestive heart failure ([facility] ) 03/10/2022    Shortness of breath 2022    Status post reverse total replacement of left shoulder 2022    Constipation 2022    Closed 4-part fracture of proximal humerus, left, initial encounter 2022    PONV (postoperative nausea and vomiting) 2022    SEBASTIAN (acute kidney injury) ([facility] ) 2022    Diabetes mellitus, type 2 ([facility] ) 2022    Gastroesophageal reflux disease 2022    Nephrolithiasis 2022    Arthritis 2022    S/P total knee replacement, right 2022    On continuous oral anticoagulation - eliquis 2022    Humeral head fracture 2022    Essential hypertension 2019    Anemia 2019    Mixed hyperlipidemia 2019    Paroxysmal atrial fibrillation ([facility] ) 2019    Lower leg edema 2019    Asthma 2018    Morbid obesity with BMI of 45 0-49 9, adult ([facility] ) 2018      LOS (days): 0  Geometric Mean LOS (GMLOS) (days):   Days to GMLOS:     OBJECTIVE:  PATIENT READMITTED TO HOSPITAL       Current admission status: Inpatient  Referral Reason: Other (Discharge planning)    Preferred Pharmacy:   [address] [facility],[address] [facility] - [place], [facility] ([address])  [address] [address] [place] [address] [address] ([address])  [place] [address]  Phone: [phone] Fax: [phone] [facility] [place] [address], [address] [place] [id number]  Phone: [phone] Fax: [phone]    Primary Care Provider: Gemma Osman MD    Primary Insurance: MEDICARE  Secondary Insurance: [employer]    ASSESSMENT:  Active Health Care Proxies     Tam Stephens [facility] Care Representative - Relative   Primary Phone: [phone] (Mobile)                Obs Notice Signed: 03/28/22 (Given by registration per chart)    Readmission Root Cause  30 Day Readmission: Yes  Who directed you to return to the hospital?: Self  Did you understand whom to contact if you had questions or problems?: Yes  Did you get your prescriptions before you left the hospital?: No  Reason[de-identified] Delivery service not offered  Were you able to get your prescriptions filled when you left the hospital?: Yes  Did you take your medications as prescribed?: Yes  Were you able to get to your follow-up appointments?: Yes  During previous admission, was a post-acute recommendation made?: Yes  What post-acute resources were offered?: Sindi [place] ([facility])  Patient was readmitted due to: Sepsis, recent left total shoulder replacement  Action Plan: IV abx, cultures pending, repeat LUE venous doppler, ID consulted    Patient Information  Admitted from[de-identified] Home  Mental Status: Alert  During Assessment patient was accompanied by: Not accompanied during assessment  Assessment information provided by[de-identified] Patient  Primary Caregiver: Family  Caregiver's Name[de-identified] Allie Kramer Relationship to Patient[de-identified] Family Member  Caregiver's Telephone Number[de-identified] [phone]  Support Systems: Family members  [place] of Residence: [address] [place] [address] do you live in?: [facility] entry access options   Select all that apply : Stairs  Number of steps to enter home : 2  Type of Current Residence: [address] [place] home  Upon entering residence, is there a bedroom on the main floor (no further steps)?: Yes  Upon entering residence, is there a bathroom on the main floor (no further steps)?: Yes  In the last 12 months, was there a time when you were not able to pay the mortgage or rent on time?: No  In the last 12 months, how many places have you lived?: 2  In the last 12 months, was there a time when you did not have a steady place to sleep or slept in a shelter (including now)?: No  Homeless/housing insecurity resource given?: N/A  Living Arrangements: Lives w/ Extended Family    Activities of Daily Living Prior to Admission  Functional Status: Assistance  Completes ADLs independently?: No  Level of ADL dependence: Assistance  Ambulates independently?: No  Level of ambulatory dependence: Assistance  Does patient currently own DME?: Yes  What DME does the patient currently own?: [address]  Does the patient have a history of Short-Term Rehab?: Yes ([facility])  Does patient have a history of [address]?: Yes (formerly [facility])  Does patient currently have [address]?: Yes    Current Home Health Care  Type of Current Home Care Services: Nurse visit,Home OT,Home PT  [address][de-identified] Edin Cash [facility] Provider[de-identified] PCP    Patient Information Continued  Income Source: Pension/senior care  Does patient have prescription coverage?: Yes  Within the past 12 months, you worried that your food would run out before you got the money to buy more : Never true  Within the past 12 months, the food you bought just didnt last and you didnt have money to get more : Never true  Food insecurity resource given?: N/A  Does patient receive dialysis treatments?: No  Does patient have a history of substance abuse?: No  Does patient have a history of Mental Health Diagnosis?: No     Means of Transportation  Means of Transport to [facility][de-identified] Family transport  In the past 12 months, has lack of transportation kept you from medical appointments or from getting medications?: No  In the past 12 months, has lack of transportation kept you from meetings, work, or from getting things needed for daily living?: No  Was application for public transport provided?: N/A    DISCHARGE DETAILS:    Discharge planning discussed with[de-identified] Patient  Freedom of Choice: Yes  Comments - Freedom of Choice: FOC reviewed with patient  If HHC is recommended patient requests [facility] with [facility]  SW will discuss again when recommendations are known  CM contacted family/caregiver?: Yes (Voice mail left for primary contact Scooby Santana)     SANDRA spoke with patient at bedside  Patient recently had a left shoulder replacement and has been staying with her niece Francisco J Sanchez in [address] [place] [address]  She used to live in her own apartment in [facility] but believes that she may stay permanently with her niece  Patient had a recent [facility] stay at [facility] and is open with [facility] now  SANDRA left a voice mail for primary contact Alireza Mark and will attempt to reach Francisco J Sanchez  Patient has not yet been medically cleared for discharge  SW will continue to follow for discharge planning needs

## 2022-03-29 NOTE — ED NOTES
Cardizem drip stopped as per Dr Matt Jones orders        Hilario Barrios, [facility]  03/28/22 2100

## 2022-03-29 NOTE — PROGRESS NOTES
Review of chart indicates patient established care with Dr Rae Ashley / [facility]  Hand off to embedded RN OP CM

## 2022-03-29 NOTE — H&P
Saul U  68   H&P- Beatriz Fu 1950, 73 y o  female MRN: [id number]  Unit/Bed#: [address] [place] [address] Encounter: [id number]  Primary Care Provider: Kelsey Wright MD   Date and time admitted to hospital: 3/28/2022  1:50 PM    * Sepsis ([facility] )  Assessment & Plan  - WBC 13---> 23 5 today   - Fever 102 9 at home - 101 in ER   - Hemodynamically stable   - clinically not in distress   - CT c/a/p pending   - UA positive asymptomatic - ?  Other occult source   - Recent Left Total shoulder replacement 3/1/22  - Superficial venous thrombophlebitis on left basilic vein   - IVF, S/p doxycycline in ER  - Blood cultures, urine cultures, pro calcitonin & repeat venous doppler LUE - Ordered   - ID consulted     Atrial fibrillation with rapid ventricular response (HCC)  Assessment & Plan  - likely secondary to sepsis   - responded to Cardizem 15 mg IV x 1   - c/w lopressor 50 mg PO BID and Eliquis 5 mg po BID     Status post reverse total replacement of left shoulder  Assessment & Plan  - 3/1/22   - consider Xray if source of sepsis remains unclear   - on exam does not appear infected     Diabetes mellitus, type 2 [facility])  Assessment & Plan  Lab Results   Component Value Date    HGBA1C 6 8 (H) 03/09/2022       Recent Labs     03/27/22  2320 03/28/22  2333   POCGLU 360* 399*       Blood Sugar Average: Last 72 hrs:  (P) 399       - Insulin sliding scale   - A1c 6 8   - C/w Lantus 20 units QHS   - also on Trulicity at home     Mixed hyperlipidemia  Assessment & Plan  - on lipitor inpatient   - crestor outpatient    Anemia  Assessment & Plan  - Hgb 10 4   - suspect hemoconcentration as previously baseline 8 5-8 9 g/dl   - anemia work up including Iron profile ordered since on eliquis     Gastroesophageal reflux disease  Assessment & Plan  - on PPI and mylanta     Morbid obesity with BMI of 45 0-49 9, adult ([facility] )  Assessment & Plan  - counseled weight loss     Chronic diastolic (congestive) heart failure [facility])  Assessment & Plan  Wt Readings from Last 3 Encounters:   03/28/22 117 kg (258 lb)   03/27/22 117 kg (258 lb)   03/25/22 117 kg (259 lb)         - no increase in weight   - fu on BNP and CT chest   - Echo 53 % EF 3/10/2022  - continue with Demadex as creatinine close to baseline   - on lopressor and statin and eliquis     Stage 3 chronic kidney disease [facility])  Assessment & Plan  Lab Results   Component Value Date    EGFR 24 03/28/2022    EGFR 27 03/27/2022    EGFR 23 03/22/2022    CREATININE 2 05 (H) 03/28/2022    CREATININE 1 86 (H) 03/27/2022    CREATININE 2 10 (H) 03/22/2022       - CKD- 1 86 2 days ago and 2 95 today   -  likely secondary to UTI and pre renal   - IVF and c/w Demadex hold if creatinine worsens  - close to baseline of 2    - avoid nephrotoxins and rx UTI        VTE Pharmacologic Prophylaxis: VTE Score: 11 Moderate Risk (Score 3-4) - Pharmacological DVT Prophylaxis Ordered: apixaban (Eliquis)  Code Status: Level 1 - Full Code    Discussion with family: Patient declined call to   Anticipated Length of Stay: Patient will be admitted on an observation basis with an anticipated length of stay of less than 2 midnights secondary to sepsis   Total Time for Visit, including Counseling / Coordination of Care: 60 minutes Greater than 50% of this total time spent on direct patient counseling and coordination of care  Chief Complaint:      History of Present Illness:    Deepak Del Real is a 73 y o  female with a PMH of chronic diastolic CHF on 2 L supplemental oxygen, asthma, insulin-dependent diabetes, GERD, hyperlipidemia, atrial fibrillation on Eliquis, kidney stones, SVT, CKD, anemia, status post left total shoulder replacement on 03/01/2022, presents to the ED for evaluation of generalized weakness, fever, fatigue, dyspnea on 3/27/22 due to fever of 102 9 at home and was dcd on keflex for possible UTI since patient preferred to go home as per ER notes       Patient returns today with worsening overall weakness and found to be in Afib with RVR in ER and responded to Cardizem 15 mg IV x 1  WBC went from 13 to 23 5  today   Fever at home   UA positive   CT c/a/p pending   She has excoriations in groin and anterior abdominal wall  Was given one dose doxycyline In ER and admitted for evaluation and management of Sepsis  Review of Systems:  Review of Systems   Constitutional: Positive for activity change, appetite change, fatigue and fever  Musculoskeletal: Positive for arthralgias and myalgias  All other systems reviewed and are negative        Past Medical and Surgical History:   Past Medical History:   Diagnosis Date    A-fib ([place] [address] )     Anemia     Asthma     [facility]congestive heart failure) ([facility])     Chronic kidney disease     CKD (chronic kidney disease)     COVID-19 January 2022    Diabetes mellitus ([place] [address] )     GERD (gastroesophageal reflux disease)     Hyperlipidemia     Kidney stones     PONV (postoperative nausea and vomiting)     SVT (supraventricular tachycardia) ([facility])        Past Surgical History:   Procedure Laterality Date    APPENDECTOMY      CYSTOSCOPY W/ LASER LITHOTRIPSY Left 7/12/2016    Procedure: CYSTOSCOPY URETEROSCOPY WITH LITHOTRIPSY HOLMIUM LASER, RETROGRADE PYELOGRAM AND INSERTION STENT URETERAL;  Surgeon: Lulú Zamorano MD;  Location: [address] [place] [address];  Service:     DILATION AND CURETTAGE OF UTERUS      IR THORACENTESIS  3/18/2022    JOINT REPLACEMENT      right knee    KNEE ARTHROPLASTY Right     VT CYSTOURETHROSCOPY,URETER CATHETER Left 6/29/2016    Procedure: CYSTOSCOPY RETROGRADE PYELOGRAM WITH INSERTION STENT URETERAL, left;  Surgeon: Lulú Zamorano MD;  Location: [address] [place] [address];  Service: Urology    VT RECONSTR TOTAL SHOULDER IMPLANT Left 3/1/2022    Procedure: ARTHROPLASTY SHOULDER REVERSE;  Surgeon: Emmett Lai MD;  Location: [facility];  Service: Orthopedics    SHOULDER ARTHROTOMY Left        Meds/Allergies:  Prior to Admission medications    Medication Sig Start Date End Date Taking?  Authorizing Provider   acetaminophen (TYLENOL) 325 mg tablet Take 650 mg by mouth every 6 (six) hours as needed for mild pain      Historical Provider, MD   albuterol (PROVENTIL HFA,VENTOLIN HFA) 90 mcg/act inhaler Inhale 2 puffs every 6 (six) hours as needed for wheezing 3/25/22   Zay Rader MD   amLODIPine (NORVASC) 5 mg tablet Take 1 tablet (5 mg total) by mouth daily 3/25/22   Zay Rader MD   ammonium lactate (LAC-HYDRIN) 12 % lotion APPLY TOPICALLY TO AFFECTED AREAS twice a day 12/16/21   Historical Provider, MD   apixaban (ELIQUIS) 5 mg Take 1 tablet (5 mg total) by mouth 2 (two) times a day 5/30/19   Simran Wright MD   cephalexin (KEFLEX) 500 mg capsule Take 1 capsule (500 mg total) by mouth every 12 (twelve) hours for 5 days 3/28/22 4/2/22  Jono Yanez,    desloratadine (CLARINEX) 5 MG tablet Take by mouth daily      Historical Provider, MD   docusate sodium (COLACE) 100 mg capsule Take 100 mg by mouth in the morning    Historical Provider, MD   lidocaine (LIDODERM) 5 % Apply 2 patches topically daily Remove & Discard patch within 12 hours or as directed by MD 2/21/22   Jared Thao DO   metoprolol tartrate (LOPRESSOR) 50 mg tablet Take 1 tablet (50 mg total) by mouth every 12 (twelve) hours 3/13/22 4/12/22  RK Keene   montelukast (SINGULAIR) 10 mg tablet Take 10 mg by mouth daily      Historical Provider, MD Cherylene Dolores 30G X 8 MM [facility]  8/25/19   Historical Provider, MD   nystatin (MYCOSTATIN) powder Apply topically 2 (two) times a day    Historical Provider, MD   pregabalin (LYRICA) 100 mg capsule Take 100 mg by mouth 2 (two) times a day     Historical Provider, MD   rosuvastatin (CRESTOR) 10 MG tablet 10 mg daily   8/29/19   Historical Provider, MD   torsemide (DEMADEX) 10 mg tablet Take 1 tablet (10 mg total) by mouth daily 3/23/22 4/22/22  Donivan President MICHELLE Rm Toujeo SoloStar 300 units/mL CONCENTRATED U-300 injection pen (1-unit dial) Inject 20 Units under the skin daily at bedtime   22   Historical Provider, MD Hansen 1 5 BQ/3 9GV SOPN inject 0 5 milliliter subcutaneously every week 21   Historical Provider, MD RENTERIA have reviewed home medications using recent Epic encounter  Allergies: Allergies   Allergen Reactions    Penicillins Hives    Moxifloxacin Other (See Comments)     unknown    Percocet [Oxycodone-Acetaminophen] Other (See Comments)     unknown    Zinc Acetate Other (See Comments)     unknown    Asa [Aspirin] GI Intolerance    Indocin [Indomethacin] Other (See Comments)     Made patient "loopy"    Other Other (See Comments)     unknown       Social History:  Marital Status: Single      Patient Pre-hospital Living Situation: Home  Patient Pre-hospital Level of Mobility: unable to be assessed at time of evaluation     Substance Use History:   Social History     Substance and Sexual Activity   Alcohol Use Not Currently    Comment: rarely     Social History     Tobacco Use   Smoking Status Former Smoker    Packs/day: 1 00    Years: 20 00    Pack years: 20 00    Types: Cigarettes    Quit date: 1996    Years since quittin 7   Smokeless Tobacco Never Used     Social History     Substance and Sexual Activity   Drug Use No       Family History:  Family History   Problem Relation Age of Onset    Heart disease Mother     Emphysema Maternal Grandmother     Heart disease Family        Physical Exam:     Vitals:   Blood Pressure: 146/57 (22 2343)  Pulse: 82 (22 0024)  Temperature: 98 9 °F (37 2 °C) (22)  Temp Source: Oral (22)  Respirations: 18 (22)  Height: 5' 2" (157 5 cm) (22 1350)  Weight - Scale: 117 kg (258 lb) (22 1350)  SpO2: 100 % (22 0024)    Physical Exam  Constitutional:       Appearance: Normal appearance  She is obese     HENT:      Head: Normocephalic and atraumatic  Nose: Nose normal       Mouth/Throat:      Mouth: Mucous membranes are moist    Eyes:      Extraocular Movements: Extraocular movements intact  Pupils: Pupils are equal, round, and reactive to light  Cardiovascular:      Rate and Rhythm: Normal rate  Heart sounds: Normal heart sounds  Pulmonary:      Effort: Pulmonary effort is normal       Breath sounds: Normal breath sounds  Abdominal:      General: Abdomen is flat  Bowel sounds are normal       Palpations: Abdomen is soft  Comments: Obese   Red excoriation anterior abdominal wall and groin   Musculoskeletal:         General: Normal range of motion  Cervical back: Normal range of motion and neck supple  Skin:     General: Skin is warm and dry  Neurological:      Mental Status: She is alert  Mental status is at baseline            Additional Data:     Lab Results:  Results from last 7 days   Lab Units 03/28/22  1443   WBC Thousand/uL 23 55*   HEMOGLOBIN g/dL 10 4*   HEMATOCRIT % 35 3   PLATELETS Thousands/uL 189   NEUTROS PCT % 82*   LYMPHS PCT % 8*   MONOS PCT % 8   EOS PCT % 1     Results from last 7 days   Lab Units 03/28/22  1443   SODIUM mmol/L 136   POTASSIUM mmol/L 4 6   CHLORIDE mmol/L 96*   CO2 mmol/L 34*   BUN mg/dL 31*   CREATININE mg/dL 2 05*   ANION GAP mmol/L 6   CALCIUM mg/dL 8 5   ALBUMIN g/dL 2 9*   TOTAL BILIRUBIN mg/dL 0 55   ALK PHOS U/L 91   ALT U/L 18   AST U/L 15   GLUCOSE RANDOM mg/dL 312*     Results from last 7 days   Lab Units 03/28/22  1443   INR  2 01*     Results from last 7 days   Lab Units 03/28/22  2333 03/27/22  2320 03/22/22  1124 03/22/22  0717   POC GLUCOSE mg/dl 399* 360* 303* 139         Results from last 7 days   Lab Units 03/28/22  1744 03/28/22  1443 03/27/22  2359   LACTIC ACID mmol/L 1 3  --  1 4   PROCALCITONIN ng/ml  --  1 95*  --        Imaging: Reviewed radiology reports from this admission including: pending   XR chest 1 view portable    (Results Pending)   XR chest 1 view portable    (Results Pending)   CT chest abdomen pelvis wo contrast    (Results Pending)   VAS upper limb venous duplex scan, unilateral/limited    (Results Pending)       EKG and Other Studies Reviewed on Admission:   · EKG: NSR    ** Please Note: This note has been constructed using a voice recognition system   **

## 2022-03-29 NOTE — ASSESSMENT & PLAN NOTE
Wt Readings from Last 3 Encounters:   03/28/22 117 kg (258 lb)   03/27/22 117 kg (258 lb)   03/25/22 117 kg (259 lb)         - no increase in weight   - proBNP 3733   CT chest improving pulmonary edema and bilateral pleural effusions  - Echo 53 % EF 3/10/2022  - continue with Demadex as creatinine close to baseline   - on lopressor and statin and eliquis   -Daily weight and I & Os

## 2022-03-29 NOTE — ASSESSMENT & PLAN NOTE
Lab Results   Component Value Date    EGFR 24 03/29/2022    EGFR 24 03/28/2022    EGFR 27 03/27/2022    CREATININE 2 06 (H) 03/29/2022    CREATININE 2 05 (H) 03/28/2022    CREATININE 1 86 (H) 03/27/2022       - CKD- 1 86 2 days ago and 2 05 on admission   -baseline creatinine appears to be around 1 4-1 6s    - likely secondary to sepsis  - received NS 1L in ED  C/w Demadex hold if creatinine worsens  - avoid nephrotoxins  -CT no hydro  -repeat BMP in the morning

## 2022-03-29 NOTE — PROGRESS NOTES
Dr Teddy Montez removed the patients triple lumen central line  Patient tolerated well  Guaze and pressure tape applied after bleeding stopped  Dressing is clean, dry, and intact  VS stable  Patient denies pain  Will continue to monitor

## 2022-03-29 NOTE — ASSESSMENT & PLAN NOTE
- Hgb 10 4   - suspect hemoconcentration as previously baseline 8 5-8 9 g/dl   - anemia work up including Iron profile ordered since on eliquis

## 2022-03-29 NOTE — ASSESSMENT & PLAN NOTE
Lab Results   Component Value Date    HGBA1C 6 8 (H) 03/09/2022       Recent Labs     03/27/22  2320 03/28/22  2333   POCGLU 360* 399*       Blood Sugar Average: Last 72 hrs:  (P) 399       - Insulin sliding scale   - A1c 6 8   - C/w Lantus 20 units QHS   - also on Trulicity at home

## 2022-03-29 NOTE — ASSESSMENT & PLAN NOTE
Wt Readings from Last 3 Encounters:   03/28/22 117 kg (258 lb)   03/27/22 117 kg (258 lb)   03/25/22 117 kg (259 lb)         - no increase in weight   - fu on BNP and CT chest   - Echo 53 % EF 3/10/2022  - continue with Demadex as creatinine close to baseline   - on lopressor and statin and eliquis

## 2022-03-29 NOTE — ASSESSMENT & PLAN NOTE
- Sepsis versus SIRS  -WBC 13---> 23 5 on admission   - Fever 102 9 at home - 101 in ER   - Hemodynamically stable   - clinically not in distress   - CT c/a/p unremarkable for acute infection  - UA positive asymptomatic - Stable appearing mild bilateral perinephric stranding on CT   - Recent Left Total shoulder replacement 3/1/22  No redness or tenderness around left shoulder  Left shoulder x-ray on 3/25 was unremarkable  - Superficial venous thrombophlebitis on left basilic vein   - IVF, S/p doxycycline in ER  - Blood cultures pending  - urine culture grew mixed contaminants x 4    -pro calcitonin elevated  -repeat venous doppler LUE - Ordered   - ID consulted, appreciate input  Started on IV Rocephin  Check ESR,CRP    Repeat procalcitonin, CBC with diff in the morning

## 2022-03-29 NOTE — CONSULTS
Consultation - Infectious Disease   Sophia Wright 73 y o  female MRN: [id number]  Unit/Bed#: [address] [address] Encounter: [id number]      IMPRESSION & RECOMMENDATIONS:   1  SIRS versus Sepsis  POA  With fever, rigors, leukocytosis, in AFib with RVR  In setting of recent acute CHF and SEBASTIAN status post recent shoulder surgery 03/01/2022  No urinary symptoms  CT mild bilateral perinephric stranding  Status post Left total shoulder replacement 31/22 with exam and left shoulder x-ray negative for any acute infection  Fever yet, WBC 23 > 18 K  -continues Ceftriaxone at present   -follow-up cultures and adjust antibiotics as needed  -monitor temperature and hemodynamics  -serial exam  -recheck CBC and BMP in a m      2  Abnormal U/A with CT mild bilateral perinephric stranding  Patient denies urinary symptoms  However patient's daughter reports that she has had UTI in the past with encephalopathy and no urinary symptoms previously  No prior urine culture found for any MDR bacteria  -antibiotic as above  -await requested 03/28/2022 urine culture identification  -monitor temperature and hemodynamics  -serial exam  -close surgical follow-up  -recheck CBC and BMP      3  SEBASTIAN on CKD  Peak creatinine 2 06  Baseline Cr 1 4-1 6   -renal dose adjust antibiotic as needed  -volume management   -recheck BMP     4  LUE SVT  03/29/2022 LUED left arm with SVT in the cephalic vein  Status post left shoulder arthroplasty with patient to be in a sling post op 3 more weeks  -monitor symptoms  -symptomatic management per primary care team  -on Eliquis anticoagulation    5  Diabetes mellitus type 2 with hyperglycemia  03/09/2022 hemoglobin A1c 6 8  -blood glucose management per primary care team    6  Morbid obesity  BMI 47    -weight loss management per primary care team  -on Eliquis anticoagulation    7  Penicillin/amoxicillin allergies  Patient reports in adulthood    However she tolerated Keflex at home   -monitor for antibiotic tolerance    Antibiotics:  Ceftriaxone     I have discussed the above management plan in detail with patient and daughter at bedside, RN, and the primary service    Extensive review of the medical records in epic including review of the notes, radiographs, and laboratory results     HISTORY OF PRESENT ILLNESS:  Reason for Consult:  Sepsis    HPI: Bal Kamara is a 73y o  year old female with chronic diastolic CHF on 2 L supplemental oxygen, asthma, insulin-dependent diabetes, GERD, hyperlipidemia, atrial fibrillation on Eliquis, kidney stones, SVT, CKD, anemia, status post left total shoulder replacement on 03/01/2022, who presented to the ED for evaluation of generalized weakness, fever, fatigue, dyspnea on 3/27/22 due to fever of 102 9 at home  Patient was given a dose ceftriaxone 1 g and was discharged on keflex for possible UTI because patient preferred to go home  Patient then returned 3/28/22 with worsening overall weakness and found to be in Afib with RVR in ER and responded to Cardizem 15 mg IV x 1  She was febrile and WBC had risen from 13 to 23 5 with persistent fever reported at home  Blood cultures were obtained  Patient was given one dose doxycyline in ER and admitted for evaluation and management of sepsis  Patient had limited IV access  A right IJ CVP line was placed on 03/29/2022 with good position on portable chest x-ray and ceftriaxone was re-initiated at 2 g IV every 24 hours  Infectious Disease now being consulted for evaluation and management of sepsis with unclear source  In review of extensive records, patient had an admission 03/09/2022 for acute CHF and required bilateral thoracentesis and IV drip Lasix management  Patient discharged on torsemide on 03/22/2022  3/28 ER visit U/A with moderate pyuria bacteriuria but urine culture reflex showed moderate colony count of mixed contaminants x 4     Patient and daughter at bedside at time of exam   Daughter reports that patient has had a urine infection in the past where she was encephalopathic without urinary symptoms  She feels that she was behaving similarly on Saturday with fever and rigors and was again denying any urinary symptoms  Patient reports feeling better with her right IJ CVP line out  She denies any left shoulder pain postoperatively  She denies any nausea, vomiting, diarrhea, chest pain, shortness of breath, cough, low back pain, or dysuria  She states she feels a little off yet  Her legs were very weak at home and she is starting to feel a little stronger  REVIEW OF SYSTEMS:  A complete review of systems is negative other than that noted in the HPI      PAST MEDICAL HISTORY:  Past Medical History:   Diagnosis Date    A-fib ([facility] )     Anemia     Asthma     [facility]congestive [facility] failure) [facility])     Chronic kidney disease     COVID-19 January 2022    Diabetes mellitus ([place] [address] )     GERD (gastroesophageal reflux disease)     Hyperlipidemia     Kidney stones     PONV (postoperative nausea and vomiting)     SVT (supraventricular tachycardia) ([facility])      Past Surgical History:   Procedure Laterality Date    APPENDECTOMY      CYSTOSCOPY W/ LASER LITHOTRIPSY Left 7/12/2016    Procedure: CYSTOSCOPY URETEROSCOPY WITH LITHOTRIPSY HOLMIUM LASER, RETROGRADE PYELOGRAM AND INSERTION STENT URETERAL;  Surgeon: Layla Yi MD;  Location: [address] [place] [address];  Service:     DILATION AND CURETTAGE OF UTERUS      IR THORACENTESIS  3/18/2022    JOINT REPLACEMENT      right knee    KNEE ARTHROPLASTY Right     IA CYSTOURETHROSCOPY,URETER CATHETER Left 6/29/2016    Procedure: CYSTOSCOPY RETROGRADE PYELOGRAM WITH INSERTION STENT URETERAL, left;  Surgeon: Layla Yi MD;  Location: [address] [place] [address];  Service: Urology    IA RECONSTR TOTAL SHOULDER IMPLANT Left 3/1/2022    Procedure: ARTHROPLASTY SHOULDER REVERSE;  Surgeon: Keri Paredes MD;  Location: [facility];  Service: Orthopedics    SHOULDER ARTHROTOMY Left FAMILY HISTORY:  Non-contributory    SOCIAL HISTORY:  Social History   Social History     Substance and Sexual Activity   Alcohol Use Not Currently    Comment: rarely     Social History     Substance and Sexual Activity   Drug Use No     Social History     Tobacco Use   Smoking Status Former Smoker    Packs/day: 1 00    Years: 20 00    Pack years: 20 00    Types: Cigarettes    Quit date: 1996    Years since quittin 7   Smokeless Tobacco Never Used       ALLERGIES:  Allergies   Allergen Reactions    Penicillins Hives    Moxifloxacin Other (See Comments)     unknown    Percocet [Oxycodone-Acetaminophen] Other (See Comments)     unknown    Zinc Acetate Other (See Comments)     unknown    Asa [Aspirin] GI Intolerance    Indocin [Indomethacin] Other (See Comments)     Made patient "loopy"    Other Other (See Comments)     unknown       MEDICATIONS:  All current active medications have been reviewed      PHYSICAL EXAM:  Temp:  [98 4 °F (36 9 °C)-101 7 °F (38 7 °C)] 100 9 °F (38 3 °C)  HR:  [] 107  Resp:  [18-20] 18  BP: (101-190)/(46-77) 102/53  SpO2:  [42 %-100 %] 100 %  Temp (24hrs), Av 2 °F (37 9 °C), Min:98 4 °F (36 9 °C), Max:101 7 °F (38 7 °C)  Current: Temperature: (!) 100 9 °F (38 3 °C)    Intake/Output Summary (Last 24 hours) at 3/29/2022 1625  Last data filed at 3/29/2022 1311  Gross per 24 hour   Intake 1736 37 ml   Output 300 ml   Net 1436 37 ml       General Appearance:  70-year-old female, chronically debilitated, appearing propped fairly comfortably in bed feeding self, and in no distress   Head:  Normocephalic, without obvious abnormality, atraumatic   Eyes:  Conjunctiva pink and sclera anicteric, both eyes   Nose: Nares normal, mucosa normal, no drainage   Throat: Oropharynx moist without lesions   Neck: Supple, symmetrical, no adenopathy, no tenderness/mass/nodules   Back:   Symmetric, no curvature, ROM normal, no CVA tenderness   Lungs:   Clear to auscultation bilaterally, respirations unlabored   Chest Wall:  No tenderness or deformity   Heart:  Tachy S1-S2, decreased tones   Abdomen:   Soft, non-tender, protuberant, positive bowel sounds    Extremities: No cyanosis, clubbing or edema, venodynes in place  Left shoulder incision well healed without surrounding erythema, drainage or swelling   : Pure wick in place with somewhat cloudy yellow urine in canister, no SPT or flank pain  Skin: No diffuse rashes or lesions  Right upper extremity midline site nontender  Intertrigo of skin folds in bilateral groin on photographs reviewed   Lymph nodes: Cervical, supraclavicular nodes normal   Neurologic: Alert and oriented times 3, extremity strength 5/5 and symmetric, decreased range of motion left upper extremity due to recent surgery       LABS, IMAGING, & OTHER STUDIES:  Lab Results:  I have personally reviewed pertinent labs  Results from last 7 days   Lab Units 03/29/22  0440 03/28/22  1443 03/27/22  2318   WBC Thousand/uL 18 14* 23 55* 13 48*   HEMOGLOBIN g/dL 7 9* 10 4* 8 9*   PLATELETS Thousands/uL 163 189 155     Results from last 7 days   Lab Units 03/29/22  0440 03/28/22  1443 03/28/22  1443 03/27/22  2318 03/27/22  2318   SODIUM mmol/L 136  --  136  --  136   POTASSIUM mmol/L 4 3   < > 4 6   < > 4 1   CHLORIDE mmol/L 99*   < > 96*   < > 96*   CO2 mmol/L 34*   < > 34*   < > 35*   BUN mg/dL 35*   < > 31*   < > 31*   CREATININE mg/dL 2 06*   < > 2 05*   < > 1 86*   EGFR ml/min/1 73sq m 24   < > 24   < > 27   CALCIUM mg/dL 7 6*   < > 8 5   < > 7 8*   AST U/L 12  --  15  --  12   ALT U/L 15   < > 18   < > 20   ALK PHOS U/L 72   < > 91   < > 90    < > = values in this interval not displayed  Results from last 7 days   Lab Units 03/28/22  0152 03/28/22  0134 03/27/22  2359   BLOOD CULTURE   --  No Growth at 24 hrs  No Growth at 24 hrs     URINE CULTURE  50,000-59,000 cfu/ml   --   --      Results from last 7 days   Lab Units 03/28/22  1443   PROCALCITONIN ng/ml 1 95*                   Imaging Studies:   Imaging reviewed personally  03/29/2022 CT chest abdomen pelvis:  Decreased bilateral pleural effusions and pulmonary edema  Lower lobe atelectasis  No hydronephrosis  Stable appearing mild bilateral perinephric stranding compared to CT imaging of 03/17/2022  No bowel obstruction or inflammatory disease  03/29/2022 portable chest x-ray:  Right IJ catheter tip in good position in SVC  03/27/2022 shoulder x-ray:  Stable reverse of total shoulder arthroplasty  3/15/22 renal ultrasound:  No hydronephrosis    Other Studies:   I have personally reviewed pertinent reports      03/29/2022 LUED left arm with SVT in the cephalic vein

## 2022-03-29 NOTE — QUICK NOTE
3/29/2022 at 3:50 PM    I was asked to remove this patient's line by Silvia Organ  Patient was encountered well and AAO x3  Agreed to removal of the triple lumen central line on the right side of her neck by me  Bandage was removed and area was cleaned  3 sutures were removed  Patient was placed in Trendelenburg Position  Line was removed without difficulties and with minimal bleeding  Pressure was applied to the area for 5 minutes  Patient tolerated the procedure well  Nurse and Dr Fern Quan were present during procedure  It was a pleasure to be of service to [employer]  Owen Castillo MD , MSMS     [address] PGY1 - Ramesh Castro

## 2022-03-29 NOTE — ASSESSMENT & PLAN NOTE
- likely secondary to sepsis   - responded to Cardizem 15 mg IV x 1   - On lopressor 50 mg PO BID and Eliquis 5 mg po BID at home   -change Lopressor to 25 mg p o  Q 6 hours due to soft BP  Continue Eliquis  -optimize electrolytes  Telemetry sinus rhythm to uncontrolled AFib

## 2022-03-29 NOTE — ASSESSMENT & PLAN NOTE
- WBC 13---> 23 5 today   - Fever 102 9 at home - 101 in ER   - Hemodynamically stable   - clinically not in distress   - CT c/a/p pending   - UA positive asymptomatic - ?  Other occult source   - Recent Left Total shoulder replacement 3/1/22  - Superficial venous thrombophlebitis on left basilic vein   - IVF, S/p doxycycline in ER  - Blood cultures, urine cultures, pro calcitonin & repeat venous doppler LUE - Ordered   - ID consulted

## 2022-03-29 NOTE — ASSESSMENT & PLAN NOTE
- Hgb 10 4 on admission  - suspect hemoconcentration as previously baseline 8 5-8 9 g/dl   -recent iron panel showed iron 32, ferritin 237  Slight low  Recent B12 338   -hemoglobin today 7 9  - Check stool OB    Monitor CBC

## 2022-03-29 NOTE — ASSESSMENT & PLAN NOTE
- likely secondary to sepsis   - responded to Cardizem 15 mg IV x 1   - c/w lopressor 50 mg PO BID and Eliquis 5 mg po BID

## 2022-03-29 NOTE — PROGRESS NOTES
[place] [facility]  Progress Note Sheri Meza 1950, 73 y o  female MRN: [id number]  Unit/Bed#: [address] [place] [address] Encounter: [id number]  Primary Care Provider: Shadi Duffy MD   Date and time admitted to hospital: 3/28/2022  1:50 PM    * Sepsis [facility])  Assessment & Plan  - Sepsis versus SIRS  -WBC 13---> 23 5 on admission   - Fever 102 9 at home - 101 in ER   - Hemodynamically stable   - clinically not in distress   - CT c/a/p unremarkable for acute infection  - UA positive asymptomatic - Stable appearing mild bilateral perinephric stranding on CT   - Recent Left Total shoulder replacement 3/1/22  No redness or tenderness around left shoulder  Left shoulder x-ray on 3/25 was unremarkable  - Superficial venous thrombophlebitis on left basilic vein   - IVF, S/p doxycycline in ER  - Blood cultures pending  - urine culture grew mixed contaminants x 4    -pro calcitonin elevated  -repeat venous doppler LUE - Ordered   - ID consulted, appreciate input  Started on IV Rocephin  Check ESR,CRP  Repeat procalcitonin, CBC with diff in the morning    Atrial fibrillation with rapid ventricular response (HCC)  Assessment & Plan  - likely secondary to sepsis   - responded to Cardizem 15 mg IV x 1   - On lopressor 50 mg PO BID and Eliquis 5 mg po BID at home   -change Lopressor to 25 mg p o  Q 6 hours due to soft BP  Continue Eliquis  -optimize electrolytes  Telemetry sinus rhythm to uncontrolled AFib  Chronic diastolic (congestive) heart failure [facility])  Assessment & Plan  Wt Readings from Last 3 Encounters:   03/28/22 117 kg (258 lb)   03/27/22 117 kg (258 lb)   03/25/22 117 kg (259 lb)         - no increase in weight   - proBNP 3733  CT chest improving pulmonary edema and bilateral pleural effusions  - Echo 53 % EF 3/10/2022  - continue with Demadex as creatinine close to baseline   - on lopressor and statin and eliquis   -Daily weight and I & Os      Stage 3 chronic kidney disease [facility])  Assessment & Plan  Lab Results   Component Value Date    EGFR 24 03/29/2022    EGFR 24 03/28/2022    EGFR 27 03/27/2022    CREATININE 2 06 (H) 03/29/2022    CREATININE 2 05 (H) 03/28/2022    CREATININE 1 86 (H) 03/27/2022       - CKD- 1 86 2 days ago and 2 05 on admission   -baseline creatinine appears to be around 1 4-1 6s    - likely secondary to sepsis  - received NS 1L in ED  C/w Demadex hold if creatinine worsens  - avoid nephrotoxins  -CT no hydro  -repeat BMP in the morning         Status post reverse total replacement of left shoulder  Assessment & Plan  - 3/1/22   -  Xray 3/25 appears stable  - on exam does not appear infected     Gastroesophageal reflux disease  Assessment & Plan  - on PPI and mylanta     Diabetes mellitus, type 2 [facility])  Assessment & Plan  Lab Results   Component Value Date    HGBA1C 6 8 (H) 03/09/2022       Recent Labs     03/27/22  2320 03/28/22  2333 03/29/22  0724 03/29/22  1136   POCGLU 360* 399* 227* 290*       Blood Sugar Average: Last 72 hrs:  (P) 839 [id number]       - Insulin sliding scale   - A1c 6 8   - C/w Lantus 20 units QHS   - also on Trulicity at home     Anemia  Assessment & Plan  - Hgb 10 4 on admission  - suspect hemoconcentration as previously baseline 8 5-8 9 g/dl   -recent iron panel showed iron 32, ferritin 237  Slight low  Recent B12 338   -hemoglobin today 7 9  - Check stool OB  Monitor CBC    Mixed hyperlipidemia  Assessment & Plan  - on lipitor inpatient   - crestor outpatient    Morbid obesity with BMI of 45 0-49 9, adult ([facility] [address] )  Assessment & Plan  - counseled weight loss       VTE Pharmacologic Prophylaxis:   Pharmacologic: Apixaban (Eliquis)  Mechanical VTE Prophylaxis in Place: yes    Patient Centered Rounds: I have performed bedside rounds with nursing staff today  Discussions with Specialists or Other Care Team Provider: yes    Education and Discussions with Family / Patient: yes    Time Spent for Care: 20 minutes    More than 50% of total time spent on counseling and coordination of care as described above  Current Length of Stay: 0 day(s)    Current Patient Status: inpatient  Certification Statement: The patient will continue to require additional inpatient hospital stay due to Sepsis versus SIRS, elevated creatinine, AFib with RVR    Discharge Plan:  Pending progress    Code Status: Level 1 - Full Code      Subjective:   Patient denies SOB, cough, sore throat, runny nose, chest pain, headache, dizziness, nausea vomiting abdominal pain, diarrhea constipation  Reports left shoulder pain post surgery  Denies urine symptoms, denies back pain or flank pain  Objective:     Vitals:   Temp (24hrs), Av 1 °F (37 8 °C), Min:98 4 °F (36 9 °C), Max:101 7 °F (38 7 °C)    Temp:  [98 4 °F (36 9 °C)-101 7 °F (38 7 °C)] 100 8 °F (38 2 °C)  HR:  [] 128  Resp:  [18-20] 18  BP: (101-190)/(46-77) 149/68  SpO2:  [42 %-100 %] 100 %  Body mass index is 47 19 kg/m²  Input and Output Summary (last 24 hours): Intake/Output Summary (Last 24 hours) at 3/29/2022 1502  Last data filed at 3/29/2022 1311  Gross per 24 hour   Intake 1736 37 ml   Output 300 ml   Net 1436 37 ml       Physical Exam:     Physical Exam  Vitals and nursing note reviewed  Constitutional:       Appearance: She is well-developed  She is obese  HENT:      Head: Normocephalic and atraumatic  Neck:      Thyroid: No thyromegaly  Vascular: No JVD  Trachea: No tracheal deviation  Cardiovascular:      Rate and Rhythm: Normal rate and regular rhythm  Heart sounds: Normal heart sounds  Comments: HR regular currently  Right IJ triple-lumen  Pulmonary:      Effort: Pulmonary effort is normal  No respiratory distress  Breath sounds: No wheezing or rales  Comments: Breath sounds diminished bilateral, on oxygen 3 L since recent discharge, satting well  Abdominal:      General: Bowel sounds are normal  There is no distension        Palpations: Abdomen is soft       Tenderness: There is no abdominal tenderness  There is no guarding  Musculoskeletal:         General: No swelling or deformity  Cervical back: Neck supple  Right lower leg: No edema  Left lower leg: No edema  Comments: Left shoulder old surgical scar healed, no redness or tenderness to left shoulder  Skin:     General: Skin is warm and dry  Comments: Rash in abdomen folds  Neurological:      General: No focal deficit present  Mental Status: She is alert and oriented to person, place, and time  Psychiatric:         Mood and Affect: Mood normal          Judgment: Judgment normal          Additional Data:     Labs:    Results from last 7 days   Lab Units 03/29/22  0440   WBC Thousand/uL 18 14*   HEMOGLOBIN g/dL 7 9*   HEMATOCRIT % 26 2*   PLATELETS Thousands/uL 163   NEUTROS PCT % 81*   LYMPHS PCT % 8*   MONOS PCT % 9   EOS PCT % 1     Results from last 7 days   Lab Units 03/29/22  0440   POTASSIUM mmol/L 4 3   CHLORIDE mmol/L 99*   CO2 mmol/L 34*   BUN mg/dL 35*   CREATININE mg/dL 2 06*   CALCIUM mg/dL 7 6*   ALK PHOS U/L 72   ALT U/L 15   AST U/L 12     Results from last 7 days   Lab Units 03/29/22  0440   INR  2 01*       * I Have Reviewed All Lab Data Listed Above  * Additional Pertinent Lab Tests Reviewed: [facility] Admission Reviewed    Imaging:    Imaging Reports Reviewed Today Include:  CT chest abdomen pelvis, chest X ray  Imaging Personally Reviewed by Myself Includes:  None    Recent Cultures (last 7 days):     Results from last 7 days   Lab Units 03/28/22  0152 03/28/22  0134 03/27/22  2359   BLOOD CULTURE   --  No Growth at 24 hrs  No Growth at 24 hrs     URINE CULTURE  50,000-59,000 cfu/ml   --   --        Last 24 Hours Medication List:   Current Facility-Administered Medications   Medication Dose Route Frequency Provider Last Rate    acetaminophen  650 mg Oral Q6H PRN Mis Correa MD      albuterol  2 puff Inhalation Q6H PRN [facility] Sudarshan Chaudhry MD      aluminum-magnesium hydroxide-simethicone  30 mL Oral Q6H PRN Bhaskar Corrales MD      Chele Pham ON 3/30/2022] amLODIPine  5 mg Oral Daily RK Clements      apixaban  5 mg Oral BID Bhaskar Corrales MD      atorvastatin  80 mg Oral Daily With Roman Hamilton MD      bisacodyl  10 mg Rectal Daily PRN Bhaskar Corrales MD      cefTRIAXone  2,000 mg Intravenous Q24H Randal Pa, DO 2,000 mg (03/29/22 1220)    docusate sodium  100 mg Oral Daily Bhaskar Corrales MD      insulin glargine  20 Units Subcutaneous HS Bhaskar Corrales MD      insulin lispro  1-6 Units Subcutaneous HS Bhaskar Corrales MD      insulin lispro  2-12 Units Subcutaneous TID [facility] Bhaskar Corrales MD      magnesium sulfate  1 g Intravenous Once RK Clements      metoprolol tartrate  25 mg Oral Q6H [address] [facility] [place]      montelukast  10 mg Oral HS Bhaskar Corrales MD      nystatin   Topical BID Bhaskar Corrales MD      ondansetron  4 mg Intravenous Q6H PRN Bhaskar Corrales MD      saccharomyces boulardii  250 mg Oral BID RK Clements      torsemide  10 mg Oral Daily Bhaskar Corrales MD          Today, Patient Was Seen By: RK Dubon    ** Please Note: Dragon 360 Dictation voice to text software may have been used in the creation of this document   **

## 2022-03-30 PROBLEM — R91.8 PULMONARY NODULES: Status: ACTIVE | Noted: 2022-03-30

## 2022-03-30 LAB
ANION GAP SERPL CALCULATED.3IONS-SCNC: 6 MMOL/L (ref 4–13)
BASOPHILS # BLD AUTO: 0.03 THOUSANDS/ΜL (ref 0–0.1)
BASOPHILS NFR BLD AUTO: 0 % (ref 0–1)
BUN SERPL-MCNC: 44 MG/DL (ref 5–25)
CALCIUM SERPL-MCNC: 7.6 MG/DL (ref 8.3–10.1)
CHLORIDE SERPL-SCNC: 98 MMOL/L (ref 100–108)
CO2 SERPL-SCNC: 33 MMOL/L (ref 21–32)
CREAT SERPL-MCNC: 2.25 MG/DL (ref 0.6–1.3)
EOSINOPHIL # BLD AUTO: 0.59 THOUSAND/ΜL (ref 0–0.61)
EOSINOPHIL NFR BLD AUTO: 4 % (ref 0–6)
ERYTHROCYTE [DISTWIDTH] IN BLOOD BY AUTOMATED COUNT: 15.6 % (ref 11.6–15.1)
GFR SERPL CREATININE-BSD FRML MDRD: 21 ML/MIN/1.73SQ M
GLUCOSE SERPL-MCNC: 211 MG/DL (ref 65–140)
GLUCOSE SERPL-MCNC: 224 MG/DL (ref 65–140)
GLUCOSE SERPL-MCNC: 242 MG/DL (ref 65–140)
GLUCOSE SERPL-MCNC: 244 MG/DL (ref 65–140)
GLUCOSE SERPL-MCNC: 251 MG/DL (ref 65–140)
HCT VFR BLD AUTO: 27.3 % (ref 34.8–46.1)
HGB BLD-MCNC: 8 G/DL (ref 11.5–15.4)
IMM GRANULOCYTES # BLD AUTO: 0.15 THOUSAND/UL (ref 0–0.2)
IMM GRANULOCYTES NFR BLD AUTO: 1 % (ref 0–2)
LYMPHOCYTES # BLD AUTO: 2.06 THOUSANDS/ΜL (ref 0.6–4.47)
LYMPHOCYTES NFR BLD AUTO: 13 % (ref 14–44)
MAGNESIUM SERPL-MCNC: 2.2 MG/DL (ref 1.6–2.6)
MCH RBC QN AUTO: 28.4 PG (ref 26.8–34.3)
MCHC RBC AUTO-ENTMCNC: 29.3 G/DL (ref 31.4–37.4)
MCV RBC AUTO: 97 FL (ref 82–98)
MONOCYTES # BLD AUTO: 1.18 THOUSAND/ΜL (ref 0.17–1.22)
MONOCYTES NFR BLD AUTO: 8 % (ref 4–12)
NEUTROPHILS # BLD AUTO: 11.63 THOUSANDS/ΜL (ref 1.85–7.62)
NEUTS SEG NFR BLD AUTO: 74 % (ref 43–75)
NRBC BLD AUTO-RTO: 0 /100 WBCS
PLATELET # BLD AUTO: 185 THOUSANDS/UL (ref 149–390)
PMV BLD AUTO: 11.7 FL (ref 8.9–12.7)
POTASSIUM SERPL-SCNC: 4 MMOL/L (ref 3.5–5.3)
PROCALCITONIN SERPL-MCNC: 3.57 NG/ML
RBC # BLD AUTO: 2.82 MILLION/UL (ref 3.81–5.12)
SODIUM SERPL-SCNC: 137 MMOL/L (ref 136–145)
WBC # BLD AUTO: 15.64 THOUSAND/UL (ref 4.31–10.16)

## 2022-03-30 PROCEDURE — 99232 SBSQ HOSP IP/OBS MODERATE 35: CPT | Performed by: INTERNAL MEDICINE

## 2022-03-30 PROCEDURE — 82948 REAGENT STRIP/BLOOD GLUCOSE: CPT

## 2022-03-30 PROCEDURE — 84145 PROCALCITONIN (PCT): CPT | Performed by: NURSE PRACTITIONER

## 2022-03-30 PROCEDURE — 85025 COMPLETE CBC W/AUTO DIFF WBC: CPT | Performed by: NURSE PRACTITIONER

## 2022-03-30 PROCEDURE — 83735 ASSAY OF MAGNESIUM: CPT | Performed by: NURSE PRACTITIONER

## 2022-03-30 PROCEDURE — 80048 BASIC METABOLIC PNL TOTAL CA: CPT | Performed by: NURSE PRACTITIONER

## 2022-03-30 RX ORDER — SODIUM CHLORIDE 9 MG/ML
60 INJECTION, SOLUTION INTRAVENOUS CONTINUOUS
Status: DISPENSED | OUTPATIENT
Start: 2022-03-30 | End: 2022-03-30

## 2022-03-30 RX ORDER — INSULIN GLARGINE 100 [IU]/ML
25 INJECTION, SOLUTION SUBCUTANEOUS
Status: DISCONTINUED | OUTPATIENT
Start: 2022-03-30 | End: 2022-04-01

## 2022-03-30 RX ADMIN — TORSEMIDE 10 MG: 10 TABLET ORAL at 08:21

## 2022-03-30 RX ADMIN — AMLODIPINE BESYLATE 5 MG: 5 TABLET ORAL at 08:21

## 2022-03-30 RX ADMIN — METOPROLOL TARTRATE 25 MG: 25 TABLET, FILM COATED ORAL at 23:48

## 2022-03-30 RX ADMIN — INSULIN LISPRO 6 UNITS: 100 INJECTION, SOLUTION INTRAVENOUS; SUBCUTANEOUS at 12:01

## 2022-03-30 RX ADMIN — DOCUSATE SODIUM 100 MG: 100 CAPSULE, LIQUID FILLED ORAL at 08:22

## 2022-03-30 RX ADMIN — INSULIN LISPRO 3 UNITS: 100 INJECTION, SOLUTION INTRAVENOUS; SUBCUTANEOUS at 17:23

## 2022-03-30 RX ADMIN — ATORVASTATIN CALCIUM 80 MG: 80 TABLET, FILM COATED ORAL at 17:19

## 2022-03-30 RX ADMIN — INSULIN LISPRO 3 UNITS: 100 INJECTION, SOLUTION INTRAVENOUS; SUBCUTANEOUS at 12:01

## 2022-03-30 RX ADMIN — APIXABAN 5 MG: 5 TABLET, FILM COATED ORAL at 08:22

## 2022-03-30 RX ADMIN — NYSTATIN: 100000 POWDER TOPICAL at 08:23

## 2022-03-30 RX ADMIN — INSULIN LISPRO 2 UNITS: 100 INJECTION, SOLUTION INTRAVENOUS; SUBCUTANEOUS at 21:20

## 2022-03-30 RX ADMIN — ACETAMINOPHEN 650 MG: 325 TABLET, FILM COATED ORAL at 14:37

## 2022-03-30 RX ADMIN — INSULIN LISPRO 3 UNITS: 100 INJECTION, SOLUTION INTRAVENOUS; SUBCUTANEOUS at 08:25

## 2022-03-30 RX ADMIN — INSULIN LISPRO 4 UNITS: 100 INJECTION, SOLUTION INTRAVENOUS; SUBCUTANEOUS at 17:24

## 2022-03-30 RX ADMIN — METOPROLOL TARTRATE 25 MG: 25 TABLET, FILM COATED ORAL at 12:04

## 2022-03-30 RX ADMIN — INSULIN GLARGINE 25 UNITS: 100 INJECTION, SOLUTION SUBCUTANEOUS at 21:20

## 2022-03-30 RX ADMIN — METOPROLOL TARTRATE 25 MG: 25 TABLET, FILM COATED ORAL at 17:22

## 2022-03-30 RX ADMIN — INSULIN LISPRO 4 UNITS: 100 INJECTION, SOLUTION INTRAVENOUS; SUBCUTANEOUS at 08:25

## 2022-03-30 RX ADMIN — APIXABAN 5 MG: 5 TABLET, FILM COATED ORAL at 17:22

## 2022-03-30 RX ADMIN — SODIUM CHLORIDE 60 ML/HR: 9 INJECTION, SOLUTION INTRAVENOUS at 12:15

## 2022-03-30 RX ADMIN — MONTELUKAST 10 MG: 10 TABLET, FILM COATED ORAL at 21:20

## 2022-03-30 RX ADMIN — CEFTRIAXONE 2000 MG: 2 INJECTION, SOLUTION INTRAVENOUS at 12:11

## 2022-03-30 RX ADMIN — Medication 250 MG: at 08:22

## 2022-03-30 RX ADMIN — Medication 250 MG: at 17:22

## 2022-03-30 RX ADMIN — METOPROLOL TARTRATE 25 MG: 25 TABLET, FILM COATED ORAL at 05:08

## 2022-03-30 NOTE — DISCHARGE INSTR - OTHER ORDERS
Skin Care Plan: 1  Cleanse wound to right flank with wound cleanser & pat dry  Apply Triad paste to wound & cover with small Allevyn bordered foam  Change every other day & as needed for soilage/dislodgement  2 Cleanse wound to right sacrum with wound cleanser & pat dry  Apply Triad paste to wound & cover with Allevyn sacral bordered foam  Change every other day & prn soilage/dislodgement  3 Cleanse skin folds daily to R&L breasts and R&L abdomen with foaming cleanser & dry well  Apply Interdry sheet (cut in half) single layer with 2" overhang to all four areas  Do not mix with antifungal powder, regular powder or lotions due to Interdry dressing impregnated with silver  Do not discard Interdry-rinse with soap & water and hang to dry  Change every 3 days & prn soilage/dislodgement (used 2 packages on all 4 areas)  4  Apply hydraguard to bony prominences not covered with foam dressings 2x/day and as needed for prevention and protection  5  Apply skin nourishing cream to the entire skin daily for moisture  6  Turn and reposition patient every 2 hours with foam wedges/pillows  7  Elevate heels off of bed with pillows to offload pressure   8   Apply pressure redistribution waffle cushion to chair when OOB

## 2022-03-30 NOTE — ASSESSMENT & PLAN NOTE
Lab Results   Component Value Date    HGBA1C 6 8 (H) 03/09/2022       Recent Labs     03/29/22  1557 03/29/22  1946 03/30/22  0716 03/30/22  1113   POCGLU 425* 389* 244* 251*       Blood Sugar Average: Last 72 hrs:  (P) 317 [id number]     · Increase Lantus 25 units HS  · Monitor Accu-Cheks, sliding scale for coverage

## 2022-03-30 NOTE — PROGRESS NOTES
Progress Note - Infectious Disease   Mata Sanchez 73 y o  female MRN: [id number]  Unit/Bed#: [address] [place] [address] Encounter: [id number]      Impression/Plan:  leukocytosis, in AFib with RVR  In setting of recent acute CHF and SEBASTIAN status post recent shoulder surgery 03/01/2022  No urinary symptoms  CT mild bilateral perinephric stranding  Status post Left total shoulder replacement 31/22 with exam and left shoulder x-ray negative for any acute infection  Fever yet, WBC 23 > 15 K  -continues Ceftriaxone at present   -follow-up cultures and adjust antibiotics as needed  -monitor temperature and hemodynamics  -serial exam  -recheck CBC and BMP in a m      2  Abnormal U/A with CT mild bilateral perinephric stranding  Patient denies urinary symptoms  However patient's daughter reports that she has had UTI in the past with encephalopathy and no urinary symptoms previously  No prior urine culture found for any MDR bacteria  Currently Urine culture with low colony count of mixed coag negative staph, Beta strep and staph aureus   -antibiotic as above  -monitor temperature and hemodynamics  -serial exam  -monitor urinary symptoms/output  -recheck CBC and BMP      3  SEBASTIAN on CKD  Peak creatinine 2 25  Baseline Cr 1 4-1 6  Patient had been scheduled for outpatient Nephrology appt today prior to this admission  -renal dose adjust antibiotic as needed  -volume management   -recheck BMP  -recommend Nephrology consult     4  LUE SVT  03/29/2022 LUED left arm with SVT in the cephalic vein  Status post left shoulder arthroplasty with patient to be in a sling post op 3 more weeks  -monitor symptoms  -Arm elevation as possible  -symptomatic management per primary care team  -on Eliquis anticoagulation     5  Diabetes mellitus type 2 with hyperglycemia  03/09/2022 hemoglobin A1c 6 8  -blood glucose management per primary care team     6  Morbid obesity  BMI 47    -weight loss management per primary care team     7  Penicillin/amoxicillin allergies  Patient reports in adulthood  However she tolerated Keflex at home   -monitor for antibiotic tolerance     Antibiotics:  Ceftriaxone D3     I have discussed the above management plan in detail with patient and granddaughter at bedside and daughter on Shelli RN, and Dr Sheri Le       Subjective:  Patient had no fever spikes, chills, sweats overnight; no nausea, vomiting, diarrhea; no cough, shortness of breath; no focal left shoulder pain but LUE edema noted  Patient denies dysuria but isn't making much urination and PVR is low  Appears to be tolerating IV antibiotic  No new symptoms  Objective:  Vitals:  Temp:  [98 °F (36 7 °C)-99 7 °F (37 6 °C)] 98 4 °F (36 9 °C)  HR:  [] 80  Resp:  [16-19] 18  BP: (103-133)/(47-60) 127/60  SpO2:  [98 %-100 %] 100 %  Temp (24hrs), Av 5 °F (36 9 °C), Min:98 °F (36 7 °C), Max:99 7 °F (37 6 °C)  Current: Temperature: 98 4 °F (36 9 °C)    Physical Exam:   General Appearance:  Alert, interactive, nontoxic, no acute distress  Throat: Oropharynx moist without lesions  Lungs:   Decreased breath sounds clear to auscultation bilaterally; no wheezes, rhonchi or rales; respirations unlabored   Heart:  RRR; no murmur   Abdomen:   Soft, no focal tenderness, protuberant, positive bowel sounds  Extremities: No clubbing, cyanosis, + MIKALA dependent edema, LUE just propped on pillow,  + venodynes   : No oneal, no SPT   Skin: No new rashes or lesions  Right arm IV site nontender  No draining wounds noted         Labs, Imaging, & Other studies:   All pertinent labs and imaging studies were personally reviewed  Results from last 7 days   Lab Units 22  0440 22  1443   WBC Thousand/uL 15 64* 18 14* 23 55*   HEMOGLOBIN g/dL 8 0* 7 9* 10 4*   PLATELETS Thousands/uL 185 163 189     Results from last 7 days   Lab Units 22  04222  0440 22  0440 22  1443 22  1443 22  2318 22  2318 SODIUM mmol/L 137  --  136  --  136   < > 136   POTASSIUM mmol/L 4 0   < > 4 3   < > 4 6   < > 4 1   CHLORIDE mmol/L 98*   < > 99*   < > 96*   < > 96*   CO2 mmol/L 33*   < > 34*   < > 34*   < > 35*   BUN mg/dL 44*   < > 35*   < > 31*   < > 31*   CREATININE mg/dL 2 25*   < > 2 06*   < > 2 05*   < > 1 86*   EGFR ml/min/1 73sq m 21   < > 24   < > 24   < > 27   CALCIUM mg/dL 7 6*   < > 7 6*   < > 8 5   < > 7 8*   AST U/L  --   --  12  --  15  --  12   ALT U/L  --   --  15   < > 18   < > 20   ALK PHOS U/L  --   --  72   < > 91   < > 90    < > = values in this interval not displayed  Results from last 7 days   Lab Units 03/28/22  0152 03/28/22  0134 03/27/22  2359   BLOOD CULTURE   --  No Growth at 48 hrs  No Growth at 48 hrs     URINE CULTURE  50,000-59,000 cfu/ml   --   --      Results from last 7 days   Lab Units 03/30/22  0429 03/28/22  1443   PROCALCITONIN ng/ml 3 57* 1 95*

## 2022-03-30 NOTE — ASSESSMENT & PLAN NOTE
This is a 79-year-old female with history of chronic diastolic CHF on 2 L supplemental oxygen, asthma, diabetes mellitus, GERD, hyperlipidemia, atrial fibrillation on Eliquis, SVT, CKD stage 3, anemia, recent left shoulder replacement on 03/01/2022 presented with generalized weakness, fever, fatigue with T-max of 102 9° at home  Patient was initially seen in the ED on 03/27/2022 for fevers and was discharged home on Keflex for possible UTI however returned with worsening symptoms      · Criteria met with leukocytosis, fever  · CT abdomen pelvis revealed decreased bilateral pleural effusions, no hydronephrosis or obstructive uropathy, no bowel obstruction or inflammatory process  · Chest x-ray revealed right IJ catheter, small right effusion  · UA positive asymptomatic - Stable appearing mild bilateral perinephric stranding on CT   · No evidence of erythema, tenderness or infection of right shoulder  · Elevated procalcitonin  · Infectious Disease consultation recommendations appreciated  · Continue with ceftriaxone  · Follow-up culture

## 2022-03-30 NOTE — PROGRESS NOTES
[place] 47  Progress Note Jacqualine Mode 1950, 73 y o  female MRN: [id number]  Unit/Bed#: [address] [place] [address] Encounter: [id number]  Primary Care Provider: Kelsey Wright MD   Date and time admitted to hospital: 3/28/2022  1:50 PM    * Sepsis [facility])  Assessment & Plan  This is a 21-year-old female with history of chronic diastolic CHF on 2 L supplemental oxygen, asthma, diabetes mellitus, GERD, hyperlipidemia, atrial fibrillation on Eliquis, SVT, CKD stage 3, anemia, recent left shoulder replacement on 03/01/2022 presented with generalized weakness, fever, fatigue with T-max of 102 9° at home  Patient was initially seen in the ED on 03/27/2022 for fevers and was discharged home on Keflex for possible UTI however returned with worsening symptoms      · Criteria met with leukocytosis, fever  · CT abdomen pelvis revealed decreased bilateral pleural effusions, no hydronephrosis or obstructive uropathy, no bowel obstruction or inflammatory process  · Chest x-ray revealed right IJ catheter, small right effusion  · UA positive asymptomatic - Stable appearing mild bilateral perinephric stranding on CT   · No evidence of erythema, tenderness or infection of right shoulder  · Elevated procalcitonin  · Infectious Disease consultation recommendations appreciated  · Continue with ceftriaxone  · Follow-up culture    Pulmonary nodules  Assessment & Plan  · Imaging revealed multiple pulmonary nodules  · Short-term follow-up with CT chest in 3 months    Chronic diastolic (congestive) heart failure (HCC)  Assessment & Plan  Wt Readings from Last 3 Encounters:   03/30/22 121 kg (266 lb)   03/27/22 117 kg (258 lb)   03/25/22 117 kg (259 lb)     · Appears euvolemic  · Echocardiogram 50% ejection fraction 03/10/2022  · ProBNP 14048, CT scan with improving pulmonary edema and bilateral effusions  · Trialing gentle IV fluids for SEBASTIAN, will monitor closely  · Hold diuretics in setting of acute kidney injury  · Daily weight and I & Os  Atrial fibrillation with rapid ventricular response (HCC)  Assessment & Plan  ·  likely secondary to sepsis   · responded to Cardizem 15 mg IV x 1   · change Lopressor to 25 mg p o  Q 6 hours due to soft BP  · Continue Eliquis  · Telemetry sinus rhythm to uncontrolled AFib  Stage 3 chronic kidney disease [facility])  Assessment & Plan  Lab Results   Component Value Date    EGFR 21 03/30/2022    EGFR 24 03/29/2022    EGFR 24 03/28/2022    CREATININE 2 25 (H) 03/30/2022    CREATININE 2 06 (H) 03/29/2022    CREATININE 2 05 (H) 03/28/2022       · CKD- 1 86 2 days ago and 2 05 on admission  · baseline creatinine appears to be around 1 4-1  6s  · Now with acute kidney injury likely secondary to sepsis  · Will hold diuretics  · Gentle IV fluids  · Urinary retention protocol  · Nephrology consultation will be appreciated  · Monitor BMP    Status post reverse total replacement of left shoulder  Assessment & Plan  · Underwent surgery on 3/1/22   · Xray 3/25 appears stable  · PT eval    Gastroesophageal reflux disease  Assessment & Plan  · PPI and mylanta     Diabetes mellitus, type 2 [facility])  Assessment & Plan  Lab Results   Component Value Date    HGBA1C 6 8 (H) 03/09/2022       Recent Labs     03/29/22  1557 03/29/22  1946 03/30/22  0716 03/30/22  1113   POCGLU 425* 389* 244* 251*       Blood Sugar Average: Last 72 hrs:  (P) 317 [id number]     · Increase Lantus 25 units HS  · Monitor Accu-Cheks, sliding scale for coverage    Anemia  Assessment & Plan  baseline 8 5-8 9 g/dl   Will monitor H&H    Mixed hyperlipidemia  Assessment & Plan  · on lipitor inpatient   · crestor outpatient    Morbid obesity with BMI of 45 0-49 9, adult (HCC)  Assessment & Plan  · Would benefit from diet and lifestyle modification        VTE Pharmacologic Prophylaxis:   Pharmacologic:  Eliquis    Patient Centered Rounds: I have performed bedside rounds with nursing staff today      Discussions with Specialists or Other Care Team Provider:  Infectious Disease    Education and Discussions with Family / Patient: Updated neice    Time Spent for Care: 20 minutes  More than 50% of total time spent on counseling and coordination of care as described above  Current Length of Stay: 1 day(s)    Current Patient Status: Inpatient   Certification Statement: The patient will continue to require additional inpatient hospital stay due to Sepsis    Discharge Plan / Estimated Discharge Date: TBD    Code Status: Level 1 - Full Code      Subjective:   Patient seen and examined at bedside, complains of generalized fatigue, denies any chest pain, abdominal pain, nausea or vomiting    Objective:     Vitals:   Temp (24hrs), Av 9 °F (37 2 °C), Min:98 °F (36 7 °C), Max:101 4 °F (38 6 °C)    Temp:  [98 °F (36 7 °C)-101 4 °F (38 6 °C)] 98 4 °F (36 9 °C)  HR:  [] 74  Resp:  [16-19] 18  BP: (103-133)/(47-67) 122/49  SpO2:  [98 %-100 %] 100 %  Body mass index is 48 65 kg/m²  Input and Output Summary (last 24 hours): Intake/Output Summary (Last 24 hours) at 3/30/2022 1539  Last data filed at 3/30/2022 1437  Gross per 24 hour   Intake 1000 ml   Output 850 ml   Net 150 ml       Physical Exam:    Constitutional: Patient is oriented to person, place and time, no acute distress, obese  HEENT:  Normocephalic, atraumatic  Cardiovascular: Normal S1S2, RRR, No murmurs/rubs/gallops appreciated  Pulmonary:  Bilateral air entry, No rhonchi/rales/wheezing appreciated  Abdominal: Soft, Bowel sounds present, Non-tender, Non-distended  Extremities:  No cyanosis, clubbing or edema  Neurological: Cranial nerves II-XII grossly intact, sensation intact, otherwise no focal neurological symptoms     Skin:  Warm, dry  Musculoskeletal, left shoulder with decreased range of motion due to pain and recent surgery    Additional Data:     Labs:    Results from last 7 days   Lab Units 22  0429   WBC Thousand/uL 15 64*   HEMOGLOBIN g/dL 8 0*   HEMATOCRIT % 27 3*   PLATELETS Thousands/uL 185   NEUTROS PCT % 74   LYMPHS PCT % 13*   MONOS PCT % 8   EOS PCT % 4     Results from last 7 days   Lab Units 03/30/22  0429 03/29/22  0440 03/29/22  0440   POTASSIUM mmol/L 4 0   < > 4 3   CHLORIDE mmol/L 98*   < > 99*   CO2 mmol/L 33*   < > 34*   BUN mg/dL 44*   < > 35*   CREATININE mg/dL 2 25*   < > 2 06*   CALCIUM mg/dL 7 6*   < > 7 6*   ALK PHOS U/L  --   --  72   ALT U/L  --   --  15   AST U/L  --   --  12    < > = values in this interval not displayed  Results from last 7 days   Lab Units 03/29/22  0440   INR  2 01*        I Have Reviewed All Lab Data Listed Above  Recent Cultures (last 7 days):     Results from last 7 days   Lab Units 03/28/22  0152 03/28/22  0134 03/27/22  2359   BLOOD CULTURE   --  No Growth at 48 hrs  No Growth at 48 hrs     URINE CULTURE  50,000-59,000 cfu/ml   --   --        Last 24 Hours Medication List:   Current Facility-Administered Medications   Medication Dose Route Frequency Provider Last Rate    acetaminophen  650 mg Oral Q6H PRN Estiven Mock MD      albuterol  2 puff Inhalation Q6H PRN Estiven Mock MD      aluminum-magnesium hydroxide-simethicone  30 mL Oral Q6H PRN Estiven Mock MD      amLODIPine  5 mg Oral Daily RK Clements      apixaban  5 mg Oral BID Estiven Mock MD      atorvastatin  80 mg Oral Daily With Marybeth Mooney MD      bisacodyl  10 mg Rectal Daily PRN Estiven Mock MD      cefTRIAXone  2,000 mg Intravenous Q24H Randal Pa DO 2,000 mg (03/30/22 1211)    docusate sodium  100 mg Oral Daily Estiven Mock MD      insulin glargine  25 Units Subcutaneous HS Micheal Mario MD      insulin lispro  1-6 Units Subcutaneous HS Estiven Mock MD      insulin lispro  2-12 Units Subcutaneous TID [facility] Estiven Mock MD      insulin lispro  3 Units Subcutaneous TID With Meals RK Clements      metoprolol tartrate  25 mg Oral Q6H [address] RK Clements      montelukast  10 mg Oral HS Kala Burleson Wolf Ferrer MD      ondansetron  4 mg Intravenous Q6H PRN Emelyn Peres MD      saccharomyces boulardii  250 mg Oral BID RK Clements      sodium chloride  60 mL/hr Intravenous Continuous Jetta Cogan, MD 60 mL/hr (03/30/22 1215)        Today, Patient Was Seen By: Jetta Cogan, MD

## 2022-03-30 NOTE — ASSESSMENT & PLAN NOTE
·  likely secondary to sepsis   · responded to Cardizem 15 mg IV x 1   · change Lopressor to 25 mg p o  Q 6 hours due to soft BP  · Continue Eliquis  · Telemetry sinus rhythm to uncontrolled AFib

## 2022-03-30 NOTE — PLAN OF CARE
Problem: Potential for Falls  Goal: Patient will remain free of falls  Description: INTERVENTIONS:  - Educate patient/family on patient safety including physical limitations  - Instruct patient to call for assistance with activity   - Consult OT/PT to assist with strengthening/mobility   - Keep Call bell within reach  - Keep bed low and locked with side rails adjusted as appropriate  - Keep care items and personal belongings within reach  - Initiate and maintain comfort rounds  - Make Fall Risk Sign visible to staff  - Offer Toileting every 2 Hours, in advance of need  - Initiate/Maintain bed alarm  - Obtain necessary fall risk management equipment: yellow bracelet  - Apply yellow socks and bracelet for high fall risk patients  - Consider moving patient to room near nurses station  Outcome: Progressing     Problem: MOBILITY - ADULT  Goal: Maintain or return to baseline ADL function  Description: INTERVENTIONS:  -  Assess patient's ability to carry out ADLs; assess patient's baseline for ADL function and identify physical deficits which impact ability to perform ADLs (bathing, care of mouth/teeth, toileting, grooming, dressing, etc )  - Assess/evaluate cause of self-care deficits   - Assess range of motion  - Assess patient's mobility; develop plan if impaired  - Assess patient's need for assistive devices and provide as appropriate  - Encourage maximum independence but intervene and supervise when necessary  - Involve family in performance of ADLs  - Assess for home care needs following discharge   - Consider OT consult to assist with ADL evaluation and planning for discharge  - Provide patient education as appropriate  Outcome: Progressing  Goal: Maintains/Returns to pre admission functional level  Description: INTERVENTIONS:  - Perform BMAT or MOVE assessment daily    - Set and communicate daily mobility goal to care team and patient/family/caregiver     - Collaborate with rehabilitation services on mobility goals if consulted  - Perform Range of Motion  times a day  - Reposition patient every  hours    - Dangle patient  times a day  - Stand patient  times a day  - Ambulate patient  times a day  - Out of bed to chair  times a day   - Out of bed for meals  times a day  - Out of bed for toileting  - Record patient progress and toleration of activity level   Outcome: Progressing     Problem: Prexisting or High Potential for Compromised Skin Integrity  Goal: Skin integrity is maintained or improved  Description: INTERVENTIONS:  - Identify patients at risk for skin breakdown  - Assess and monitor skin integrity  - Assess and monitor nutrition and hydration status  - Monitor labs   - Assess for incontinence   - Turn and reposition patient  - Assist with mobility/ambulation  - Relieve pressure over bony prominences  - Avoid friction and shearing  - Provide appropriate hygiene as needed including keeping skin clean and dry  - Evaluate need for skin moisturizer/barrier cream  - Collaborate with interdisciplinary team   - Patient/family teaching  - Consider wound care consult   Outcome: Progressing     Problem: PAIN - ADULT  Goal: Verbalizes/displays adequate comfort level or baseline comfort level  Description: Interventions:  - Encourage patient to monitor pain and request assistance  - Assess pain using appropriate pain scale  - Administer analgesics based on type and severity of pain and evaluate response  - Implement non-pharmacological measures as appropriate and evaluate response  - Consider cultural and social influences on pain and pain management  - Notify physician/advanced practitioner if interventions unsuccessful or patient reports new pain  Outcome: Progressing     Problem: INFECTION - ADULT  Goal: Absence or prevention of progression during hospitalization  Description: INTERVENTIONS:  - Assess and monitor for signs and symptoms of infection  - Monitor lab/diagnostic results  - Monitor all insertion sites, i e  indwelling lines, tubes, and drains  - Monitor endotracheal if appropriate and nasal secretions for changes in amount and color  - [place] appropriate cooling/warming therapies per order  - Administer medications as ordered  - Instruct and encourage patient and family to use good hand hygiene technique  - Identify and instruct in appropriate isolation precautions for identified infection/condition  Outcome: Progressing     Problem: SAFETY ADULT  Goal: Patient will remain free of falls  Description: INTERVENTIONS:  - Educate patient/family on patient safety including physical limitations  - Instruct patient to call for assistance with activity   - Consult OT/PT to assist with strengthening/mobility   - Keep Call bell within reach  - Keep bed low and locked with side rails adjusted as appropriate  - Keep care items and personal belongings within reach  - Initiate and maintain comfort rounds  - Make Fall Risk Sign visible to staff  - Offer Toileting every  Hours, in advance of need  - Initiate/Maintain alarm  - Obtain necessary fall risk management equipment:   - Apply yellow socks and bracelet for high fall risk patients  - Consider moving patient to room near nurses station  Outcome: Progressing  Goal: Maintain or return to baseline ADL function  Description: INTERVENTIONS:  -  Assess patient's ability to carry out ADLs; assess patient's baseline for ADL function and identify physical deficits which impact ability to perform ADLs (bathing, care of mouth/teeth, toileting, grooming, dressing, etc )  - Assess/evaluate cause of self-care deficits   - Assess range of motion  - Assess patient's mobility; develop plan if impaired  - Assess patient's need for assistive devices and provide as appropriate  - Encourage maximum independence but intervene and supervise when necessary  - Involve family in performance of ADLs  - Assess for home care needs following discharge   - Consider OT consult to assist with ADL evaluation and planning for discharge  - Provide patient education as appropriate  Outcome: Progressing  Goal: Maintains/Returns to pre admission functional level  Description: INTERVENTIONS:  - Perform BMAT or MOVE assessment daily    - Set and communicate daily mobility goal to care team and patient/family/caregiver  - Collaborate with rehabilitation services on mobility goals if consulted  - Perform Range of Motion  times a day  - Reposition patient every  hours  - Dangle patient  times a day  - Stand patient  times a day  - Ambulate patient  times a day  - Out of bed to chair  times a day   - Out of bed for meals  times a day  - Out of bed for toileting  - Record patient progress and toleration of activity level   Outcome: Progressing     Problem: DISCHARGE PLANNING  Goal: Discharge to home or other facility with appropriate resources  Description: INTERVENTIONS:  - Identify barriers to discharge w/patient and caregiver  - Arrange for needed discharge resources and transportation as appropriate  - Identify discharge learning needs (meds, wound care, etc )  - Arrange for interpretive services to assist at discharge as needed  - Refer to Case Management Department for coordinating discharge planning if the patient needs post-hospital services based on physician/advanced practitioner order or complex needs related to functional status, cognitive ability, or social support system  Outcome: Progressing     Problem: Knowledge Deficit  Goal: Patient/family/caregiver demonstrates understanding of disease process, treatment plan, medications, and discharge instructions  Description: Complete learning assessment and assess knowledge base    Interventions:  - Provide teaching at level of understanding  - Provide teaching via preferred learning methods  Outcome: Progressing     Problem: Nutrition/Hydration-ADULT  Goal: Nutrient/Hydration intake appropriate for improving, restoring or maintaining nutritional needs  Description: Monitor and assess patient's nutrition/hydration status for malnutrition  Collaborate with interdisciplinary team and initiate plan and interventions as ordered  Monitor patient's weight and dietary intake as ordered or per policy  Utilize nutrition screening tool and intervene as necessary  Determine patient's food preferences and provide high-protein, high-caloric foods as appropriate       INTERVENTIONS:  - Monitor oral intake, urinary output, labs, and treatment plans  - Assess nutrition and hydration status and recommend course of action  - Evaluate amount of meals eaten  - Assist patient with eating if necessary   - Allow adequate time for meals  - Recommend/ encourage appropriate diets, oral nutritional supplements, and vitamin/mineral supplements  - Order, calculate, and assess calorie counts as needed  - Recommend, monitor, and adjust tube feedings and TPN/PPN based on assessed needs  - Assess need for intravenous fluids  - Provide specific nutrition/hydration education as appropriate  - Include patient/family/caregiver in decisions related to nutrition  Outcome: Progressing

## 2022-03-30 NOTE — WOUND OSTOMY CARE
Progress Note - Wound   Mello Henderson 73 y o  female MRN: [id number]  Unit/Bed#: [address] [place] [address] Encounter: [id number]      Assessment: This is a 73year old female patient admitted on 3/28/22 with sepsis  She has a history of AFIB, DM2 with Ropb0j=0 8, GERD, CHF, CKD3, morbid obesity and s/p left total shoulder replacement 3/1/22  She was awake, alert & oriented x 3 and was pleasant and cooperative  She moves in bed with assist of 2 and is dependent on nursing staff for all of her care  She has external female urinary catheter device in place and no fecal incontinence noted  Primary RN Maite North present at time of wound care consult  Please see below for assessment of wounds  Plan: 1  Cleanse wound to right flank with NSS & pat dry  Apply Triad paste to wound & cover with small Allevyn bordered foam  Change every other day & prn soilage/dislodgement  2 Cleanse wound to right sacrum with NSS & pat dry  Apply Triad paste to wound & cover with Allevyn sacral bordered foam  Change every other day & prn soilage/dislodgement  If patient has fecal incontinence more than 1x/shift, may apply Triad paste 3x/day and leave open to air  3 Cleanse skin folds daily to R&L breasts and R&L abdomen with foaming cleanser & dry well  Apply Interdry sheet (cut in half) single layer with 2" overhang to all four areas  Do not mix with antifungal powder, regular powder or lotions due to Interdry dressing impregnated with silver  Do not discard Interdry-rinse with soap & water and hang to dry  Change every 3 days & prn soilage/dislodgement (used 2 packages on all 4 areas)  4  Apply hydraguard to bony prominences not covered with foam dressings BID and PRN for prevention and protection  5  Apply skin nourishing cream to the entire skin daily for moisture  6  Turn and reposition patient every 2 hours with foam wedges/pillows  7  Elevate heels off of bed with pillows to offload pressure   8   Apply bariatric EHOB waffle cushion to chair when OOB, if able          Wound 03/01/22 Shoulder Left (Active)   Wound Image   03/30/22 0923   Wound Description Epithelialization (HEALED) 03/30/22 0923   Marlene-wound Assessment Dry; Intact;Pink;Scaly 03/30/22 0923   Wound Length (cm) 0 cm 03/30/22 0923   Wound Width (cm) 0 cm 03/30/22 0923   Wound Depth (cm) 0 cm 03/30/22 0923   Wound Surface Area (cm^2) 0 cm^2 03/30/22 0923   Wound Volume (cm^3) 0 cm^3 03/30/22 0923   Calculated Wound Volume (cm^3) 0 cm^3 03/30/22 0923   Drainage Amount None 03/30/22 0923   Dressing Open to air 03/30/22 0923       Wound 03/09/22 Abrasion(s) Flank Right (Active)   Wound Image   03/30/22 0924   Wound Description Pink;Granulation tissue; Yellow;Slough 03/30/22 0924   Marlene-wound Assessment Intact; Pink 03/30/22 0924   Wound Length (cm) 0 6 cm 03/30/22 0924   Wound Width (cm) 1 1 cm 03/30/22 0924   Wound Depth (cm) 0 1 cm 03/30/22 0924   Wound Surface Area (cm^2) 0 66 cm^2 03/30/22 0924   Wound Volume (cm^3) 0 066 cm^3 03/30/22 0924   Calculated Wound Volume (cm^3) 0 07 cm^3 03/30/22 0924   Drainage Amount None 03/30/22 0924   Non-staged Wound Description Full thickness 03/30/22 0924   Treatments Cleansed 03/30/22 0924   Dressing Foam, Silicon (eg  Allevyn, etc) 03/30/22 0924   Wound packed? No 03/30/22 0924   Dressing Changed New 03/30/22 0924   Dressing Status Clean;Dry; Intact 03/30/22 0924       Wound 03/28/22 Pressure Injury Sacrum (Active)   Wound Image   03/30/22 0945   Wound Description Beefy red;Black;Eschar;Granulation tissue 03/30/22 0945   Pressure Injury Stage Stage 3 Present on admission 03/30/22 0945   Wound Length (cm) 6 cm 03/30/22 0945   Wound Width (cm) 1 5 cm 03/30/22 0945   Wound Depth (cm) 0 cm 03/30/22 0945   Wound Surface Area (cm^2) 9 cm^2 03/30/22 0945   Wound Volume (cm^3) 0 cm^3 03/30/22 0945   Calculated Wound Volume (cm^3) 0 cm^3 03/30/22 0945   Drainage Amount None 03/30/22 0945   Treatments Cleansed 03/30/22 0945   Dressing Foam, Silicon (eg   Allevyn, etc) 03/30/22 0945   Wound packed? No 03/30/22 0945   Dressing Changed New 03/30/22 0945   Dressing Status Clean;Dry; Intact 03/30/22 0945       Discussed assessment findings, and plan of care/recommendations with Samuel Scherer  Wound care will follow along with patient throughout admission, please call or tiger text with questions and concerns    Recommendations written as orders    Katherine Mendoza RN, BSN, [employer]

## 2022-03-30 NOTE — ASSESSMENT & PLAN NOTE
Lab Results   Component Value Date    EGFR 21 03/30/2022    EGFR 24 03/29/2022    EGFR 24 03/28/2022    CREATININE 2 25 (H) 03/30/2022    CREATININE 2 06 (H) 03/29/2022    CREATININE 2 05 (H) 03/28/2022       · CKD- 1 86 2 days ago and 2 05 on admission  · baseline creatinine appears to be around 1 4-1  6s  · Now with acute kidney injury likely secondary to sepsis    · Will hold diuretics  · Gentle IV fluids  · Urinary retention protocol  · Nephrology consultation will be appreciated  · Monitor BMP

## 2022-03-30 NOTE — ASSESSMENT & PLAN NOTE
Wt Readings from Last 3 Encounters:   03/30/22 121 kg (266 lb)   03/27/22 117 kg (258 lb)   03/25/22 117 kg (259 lb)     · Appears euvolemic  · Echocardiogram 50% ejection fraction 03/10/2022  · ProBNP 04612, CT scan with improving pulmonary edema and bilateral effusions  · Trialing gentle IV fluids for SEBASTIAN, will monitor closely  · Hold diuretics in setting of acute kidney injury  · Daily weight and I & Os

## 2022-03-31 LAB
ANION GAP SERPL CALCULATED.3IONS-SCNC: 8 MMOL/L (ref 4–13)
BASOPHILS # BLD AUTO: 0.03 THOUSANDS/ΜL (ref 0–0.1)
BASOPHILS NFR BLD AUTO: 0 % (ref 0–1)
BUN SERPL-MCNC: 46 MG/DL (ref 5–25)
CALCIUM SERPL-MCNC: 7.8 MG/DL (ref 8.3–10.1)
CHLORIDE SERPL-SCNC: 100 MMOL/L (ref 100–108)
CO2 SERPL-SCNC: 31 MMOL/L (ref 21–32)
CREAT SERPL-MCNC: 1.91 MG/DL (ref 0.6–1.3)
EOSINOPHIL # BLD AUTO: 0.62 THOUSAND/ΜL (ref 0–0.61)
EOSINOPHIL NFR BLD AUTO: 5 % (ref 0–6)
ERYTHROCYTE [DISTWIDTH] IN BLOOD BY AUTOMATED COUNT: 15.4 % (ref 11.6–15.1)
GFR SERPL CREATININE-BSD FRML MDRD: 26 ML/MIN/1.73SQ M
GLUCOSE SERPL-MCNC: 177 MG/DL (ref 65–140)
GLUCOSE SERPL-MCNC: 177 MG/DL (ref 65–140)
GLUCOSE SERPL-MCNC: 183 MG/DL (ref 65–140)
GLUCOSE SERPL-MCNC: 193 MG/DL (ref 65–140)
GLUCOSE SERPL-MCNC: 204 MG/DL (ref 65–140)
HCT VFR BLD AUTO: 25 % (ref 34.8–46.1)
HGB BLD-MCNC: 7.6 G/DL (ref 11.5–15.4)
IMM GRANULOCYTES # BLD AUTO: 0.16 THOUSAND/UL (ref 0–0.2)
IMM GRANULOCYTES NFR BLD AUTO: 1 % (ref 0–2)
LYMPHOCYTES # BLD AUTO: 1.58 THOUSANDS/ΜL (ref 0.6–4.47)
LYMPHOCYTES NFR BLD AUTO: 14 % (ref 14–44)
MCH RBC QN AUTO: 28.9 PG (ref 26.8–34.3)
MCHC RBC AUTO-ENTMCNC: 30.4 G/DL (ref 31.4–37.4)
MCV RBC AUTO: 95 FL (ref 82–98)
MONOCYTES # BLD AUTO: 0.96 THOUSAND/ΜL (ref 0.17–1.22)
MONOCYTES NFR BLD AUTO: 8 % (ref 4–12)
NEUTROPHILS # BLD AUTO: 8.11 THOUSANDS/ΜL (ref 1.85–7.62)
NEUTS SEG NFR BLD AUTO: 72 % (ref 43–75)
NRBC BLD AUTO-RTO: 0 /100 WBCS
PLATELET # BLD AUTO: 204 THOUSANDS/UL (ref 149–390)
PMV BLD AUTO: 11.3 FL (ref 8.9–12.7)
POTASSIUM SERPL-SCNC: 4 MMOL/L (ref 3.5–5.3)
RBC # BLD AUTO: 2.63 MILLION/UL (ref 3.81–5.12)
SODIUM SERPL-SCNC: 139 MMOL/L (ref 136–145)
WBC # BLD AUTO: 11.46 THOUSAND/UL (ref 4.31–10.16)

## 2022-03-31 PROCEDURE — 82948 REAGENT STRIP/BLOOD GLUCOSE: CPT

## 2022-03-31 PROCEDURE — 99222 1ST HOSP IP/OBS MODERATE 55: CPT | Performed by: INTERNAL MEDICINE

## 2022-03-31 PROCEDURE — 99232 SBSQ HOSP IP/OBS MODERATE 35: CPT | Performed by: NURSE PRACTITIONER

## 2022-03-31 PROCEDURE — 85025 COMPLETE CBC W/AUTO DIFF WBC: CPT | Performed by: INTERNAL MEDICINE

## 2022-03-31 PROCEDURE — 97530 THERAPEUTIC ACTIVITIES: CPT

## 2022-03-31 PROCEDURE — 97163 PT EVAL HIGH COMPLEX 45 MIN: CPT

## 2022-03-31 PROCEDURE — 97167 OT EVAL HIGH COMPLEX 60 MIN: CPT

## 2022-03-31 PROCEDURE — 80048 BASIC METABOLIC PNL TOTAL CA: CPT | Performed by: INTERNAL MEDICINE

## 2022-03-31 PROCEDURE — 99232 SBSQ HOSP IP/OBS MODERATE 35: CPT | Performed by: INTERNAL MEDICINE

## 2022-03-31 RX ORDER — CEFDINIR 300 MG/1
300 CAPSULE ORAL EVERY 12 HOURS SCHEDULED
Status: DISCONTINUED | OUTPATIENT
Start: 2022-03-31 | End: 2022-04-01

## 2022-03-31 RX ORDER — TORSEMIDE 10 MG/1
10 TABLET ORAL DAILY
Status: DISCONTINUED | OUTPATIENT
Start: 2022-03-31 | End: 2022-04-02 | Stop reason: HOSPADM

## 2022-03-31 RX ADMIN — INSULIN LISPRO 1 UNITS: 100 INJECTION, SOLUTION INTRAVENOUS; SUBCUTANEOUS at 21:36

## 2022-03-31 RX ADMIN — INSULIN GLARGINE 25 UNITS: 100 INJECTION, SOLUTION SUBCUTANEOUS at 21:10

## 2022-03-31 RX ADMIN — TORSEMIDE 10 MG: 10 TABLET ORAL at 12:02

## 2022-03-31 RX ADMIN — Medication 250 MG: at 08:52

## 2022-03-31 RX ADMIN — CEFDINIR 300 MG: 300 CAPSULE ORAL at 23:20

## 2022-03-31 RX ADMIN — METOPROLOL TARTRATE 25 MG: 25 TABLET, FILM COATED ORAL at 05:32

## 2022-03-31 RX ADMIN — INSULIN LISPRO 3 UNITS: 100 INJECTION, SOLUTION INTRAVENOUS; SUBCUTANEOUS at 12:02

## 2022-03-31 RX ADMIN — INSULIN LISPRO 2 UNITS: 100 INJECTION, SOLUTION INTRAVENOUS; SUBCUTANEOUS at 07:48

## 2022-03-31 RX ADMIN — ATORVASTATIN CALCIUM 80 MG: 80 TABLET, FILM COATED ORAL at 17:21

## 2022-03-31 RX ADMIN — APIXABAN 5 MG: 5 TABLET, FILM COATED ORAL at 17:21

## 2022-03-31 RX ADMIN — INSULIN LISPRO 3 UNITS: 100 INJECTION, SOLUTION INTRAVENOUS; SUBCUTANEOUS at 07:48

## 2022-03-31 RX ADMIN — METOPROLOL TARTRATE 25 MG: 25 TABLET, FILM COATED ORAL at 17:21

## 2022-03-31 RX ADMIN — INSULIN LISPRO 4 UNITS: 100 INJECTION, SOLUTION INTRAVENOUS; SUBCUTANEOUS at 16:39

## 2022-03-31 RX ADMIN — INSULIN LISPRO 2 UNITS: 100 INJECTION, SOLUTION INTRAVENOUS; SUBCUTANEOUS at 12:02

## 2022-03-31 RX ADMIN — ONDANSETRON 4 MG: 2 INJECTION INTRAMUSCULAR; INTRAVENOUS at 05:32

## 2022-03-31 RX ADMIN — APIXABAN 5 MG: 5 TABLET, FILM COATED ORAL at 08:52

## 2022-03-31 RX ADMIN — DOCUSATE SODIUM 100 MG: 100 CAPSULE, LIQUID FILLED ORAL at 08:52

## 2022-03-31 RX ADMIN — CEFDINIR 300 MG: 300 CAPSULE ORAL at 12:02

## 2022-03-31 RX ADMIN — Medication 250 MG: at 17:21

## 2022-03-31 RX ADMIN — METOPROLOL TARTRATE 25 MG: 25 TABLET, FILM COATED ORAL at 12:02

## 2022-03-31 RX ADMIN — INSULIN LISPRO 3 UNITS: 100 INJECTION, SOLUTION INTRAVENOUS; SUBCUTANEOUS at 16:41

## 2022-03-31 RX ADMIN — MONTELUKAST 10 MG: 10 TABLET, FILM COATED ORAL at 21:10

## 2022-03-31 RX ADMIN — METOPROLOL TARTRATE 25 MG: 25 TABLET, FILM COATED ORAL at 23:20

## 2022-03-31 NOTE — ASSESSMENT & PLAN NOTE
Lab Results   Component Value Date    EGFR 26 03/31/2022    EGFR 21 03/30/2022    EGFR 24 03/29/2022    CREATININE 1 91 (H) 03/31/2022    CREATININE 2 25 (H) 03/30/2022    CREATININE 2 06 (H) 03/29/2022     Nephrology consulted  As discussed with Nephrology may have to accept slightly higher creatinine as patient's new baseline in order to maintain euvolemia  Repeat labs in a m   · CKD- 1 86 2 days ago and 2 05 on admission  · baseline creatinine appears to be around 1 4-1  6s  · Now with acute kidney injury likely secondary to sepsis    · Will hold diuretics  · Gentle IV fluids  · Urinary retention protocol  · Nephrology consultation will be appreciated  · Monitor BMP

## 2022-03-31 NOTE — ASSESSMENT & PLAN NOTE
Wt Readings from Last 3 Encounters:   03/31/22 121 kg (267 lb 3 2 oz)   03/27/22 117 kg (258 lb)   03/25/22 117 kg (259 lb)     · Appears euvolemic  · Echocardiogram 50% ejection fraction 03/10/2022  · ProBNP 52467, CT scan with improving pulmonary edema and bilateral effusions  · Nephrology consulted, patient had received gentle IV hydration; as per discussion with Nephrology may have to except slightly higher creatinine for patient to maintain euvolemia  Will restart patient's Demadex 10 mg p o  Daily and monitor response    · Daily weight and I & Os

## 2022-03-31 NOTE — ASSESSMENT & PLAN NOTE
This is a 77-year-old female with history of chronic diastolic CHF on 2 L supplemental oxygen, asthma, diabetes mellitus, GERD, hyperlipidemia, atrial fibrillation on Eliquis, SVT, CKD stage 3, anemia, recent left shoulder replacement on 03/01/2022 presented with generalized weakness, fever, fatigue with T-max of 102 9° at home  Patient was initially seen in the ED on 03/27/2022 for fevers and was discharged home on Keflex for possible UTI however returned with worsening symptoms      · Criteria met with leukocytosis, fever  · CT abdomen pelvis revealed decreased bilateral pleural effusions, no hydronephrosis or obstructive uropathy, no bowel obstruction or inflammatory process  · Chest x-ray revealed right IJ catheter, small right effusion  · UA positive asymptomatic - Stable appearing mild bilateral perinephric stranding on CT   · No evidence of erythema, tenderness or infection of right shoulder  · Elevated procalcitonin  · Infectious Disease consultation recommendations appreciated  · Patient had been on ceftriaxone, transition to oral Omnicef 300 mg p o  Q 12 hours for total 1 week antibiotic course through April 4th, 2020   · Blood cultures negative to date

## 2022-03-31 NOTE — PLAN OF CARE
Problem: Potential for Falls  Goal: Patient will remain free of falls  Description: INTERVENTIONS:  - Educate patient/family on patient safety including physical limitations  - Instruct patient to call for assistance with activity   - Consult OT/PT to assist with strengthening/mobility   - Keep Call bell within reach  - Keep bed low and locked with side rails adjusted as appropriate  - Keep care items and personal belongings within reach  - Initiate and maintain comfort rounds  - Make Fall Risk Sign visible to staff  - Offer Toileting every 2 Hours, in advance of need  - Initiate/Maintain bedalarm  - Obtain necessary fall risk management equipment: n/a  - Apply yellow socks and bracelet for high fall risk patients  - Consider moving patient to room near nurses station  Outcome: Progressing     Problem: INFECTION - ADULT  Goal: Absence or prevention of progression during hospitalization  Description: INTERVENTIONS:  - Assess and monitor for signs and symptoms of infection  - Monitor lab/diagnostic results  - Monitor all insertion sites, i e  indwelling lines, tubes, and drains  - Monitor endotracheal if appropriate and nasal secretions for changes in amount and color  - Pride appropriate cooling/warming therapies per order  - Administer medications as ordered  - Instruct and encourage patient and family to use good hand hygiene technique  - Identify and instruct in appropriate isolation precautions for identified infection/condition  Outcome: Progressing     Problem: PAIN - ADULT  Goal: Verbalizes/displays adequate comfort level or baseline comfort level  Description: Interventions:  - Encourage patient to monitor pain and request assistance  - Assess pain using appropriate pain scale  - Administer analgesics based on type and severity of pain and evaluate response  - Implement non-pharmacological measures as appropriate and evaluate response  - Consider cultural and social influences on pain and pain management  - Notify physician/advanced practitioner if interventions unsuccessful or patient reports new pain  Outcome: Progressing

## 2022-03-31 NOTE — PROGRESS NOTES
Progress Note - Infectious Disease   Humberto Driver 73 y o  female MRN: [id number]  Unit/Bed#: [address] [place] [address] Encounter: [id number]      Impression/Plan:  1  SIRS versus Sepsis  POA  With fever, rigors, leukocytosis, in AFib with RVR   In setting of recent acute CHF and SEBASTIAN status post recent shoulder surgery 03/01/2022   No urinary symptoms   CT mild bilateral perinephric stranding  Status post Left total shoulder replacement 31/22 with exam and left shoulder x-ray negative for any acute infection  Fever yet, WBC 23 > 11 K  -transition to cefdinir 300 mg PO q 12 hours to complete 1 week antibiotic course from high dose Ceftriaxone through 4/4/22  -follow-up final blood cultures  -monitor temperature and hemodynamics     2  Abnormal U/A with CT mild bilateral perinephric stranding   Patient denies urinary symptoms   However patient's daughter reports that she has had UTI in the past with encephalopathy and no urinary symptoms previously   No prior urine culture found for any MDR bacteria  Currently Urine culture with low colony count of mixed coag negative staph, Beta strep and staph aureus   -antibiotic as above  -monitor temperature and hemodynamics  -serial exam  -monitor urinary symptoms/output  -recheck CBC and BMP      3  SEBASTIAN on CKD  Peak creatinine 2 25 > 1 91  Baseline Cr 1 4-1 6   Patient had been scheduled for outpatient Nephrology appt today prior to this admission  -renal dose adjust antibiotic as needed  -volume management per Nephrology  -recheck BMP     4  LUE SVT  03/29/2022 LUED left arm with SVT in the cephalic vein  Status post left shoulder arthroplasty with patient to be in a sling post op 3 more weeks  -monitor symptoms  -Arm elevation as possible  -symptomatic management per primary care team  -on Eliquis anticoagulation     5  Diabetes mellitus type 2 with hyperglycemia   03/09/2022 hemoglobin A1c 6 8  -blood glucose management per primary care team     6  Morbid obesity   BMI 47    -weight loss management per primary care team     7  Penicillin/amoxicillin allergies   Patient reports in adulthood  Yanely Goldsmith she tolerated Keflex at home   -monitor for antibiotic tolerance     Antibiotics:  Ceftriaxone D3     I have discussed the above management plan in detail with patient, RN, and Dr Kerry Rivero  Infectious disease consultation service will sign off for now  Please call if any new signs or symptoms of infection develop  Thank you!      Subjective:  Patient had no fever spikes, chills, sweats overnight; no nausea, vomiting, diarrhea; no cough, shortness of breath; no focal left shoulder pain and LUE edema better with elevation and sling  Patient denies dysuria  Patient now using commode more than pure wick  Appears to be tolerating antibiotic  No new symptoms  Objective:  Vitals:  Temp:  [98 °F (36 7 °C)-98 8 °F (37 1 °C)] 98 °F (36 7 °C)  HR:  [73-83] 77  Resp:  [17-18] 18  BP: (110-154)/(49-77) 154/77  SpO2:  [97 %-100 %] 100 %  Temp (24hrs), Av 3 °F (36 8 °C), Min:98 °F (36 7 °C), Max:98 8 °F (37 1 °C)  Current: Temperature: 98 °F (36 7 °C)    Physical Exam:   General Appearance:  Alert, interactive, nontoxic, no acute distress  Sitting in chair  Throat: Oropharynx moist without lesions  Lungs:   Decreased breath sounds fairly clear to auscultation bilaterally; no wheezes, rhonchi or rales; respirations unlabored   Heart:  RRR; no murmur   Abdomen:   Soft, non-tender, protuberant, positive bowel sounds  Extremities: No clubbing, cyanosis, 1+ LE edema with legs dependent  left upper extremity sling in place and propped on pillows  : Pure wick with clear, yellow urine in canister, no SPT   Skin: No new rashes or lesions  Right arm IV site nontender  No draining wounds noted         Labs, Imaging, & Other studies:   All pertinent labs and imaging studies were personally reviewed  Results from last 7 days   Lab Units 22  0548 22  0429 22  0440   WBC Thousand/uL 11 46* 15 64* 18 14*   HEMOGLOBIN g/dL 7 6* 8 0* 7 9*   PLATELETS Thousands/uL 204 185 163     Results from last 7 days   Lab Units 03/31/22  0548 03/30/22  0429 03/30/22  0429 03/29/22  0440 03/29/22  0440 03/28/22  1443 03/28/22  1443 03/27/22  2318 03/27/22  2318   SODIUM mmol/L 139  --  137  --  136   < > 136   < > 136   POTASSIUM mmol/L 4 0   < > 4 0   < > 4 3   < > 4 6   < > 4 1   CHLORIDE mmol/L 100   < > 98*   < > 99*   < > 96*   < > 96*   CO2 mmol/L 31   < > 33*   < > 34*   < > 34*   < > 35*   BUN mg/dL 46*   < > 44*   < > 35*   < > 31*   < > 31*   CREATININE mg/dL 1 91*   < > 2 25*   < > 2 06*   < > 2 05*   < > 1 86*   EGFR ml/min/1 73sq m 26   < > 21   < > 24   < > 24   < > 27   CALCIUM mg/dL 7 8*   < > 7 6*   < > 7 6*   < > 8 5   < > 7 8*   AST U/L  --   --   --   --  12  --  15  --  12   ALT U/L  --   --   --   --  15   < > 18   < > 20   ALK PHOS U/L  --   --   --   --  72   < > 91   < > 90    < > = values in this interval not displayed  Results from last 7 days   Lab Units 03/28/22  0152 03/28/22  0134 03/27/22  2359   BLOOD CULTURE   --  No Growth at 72 hrs  No Growth at 72 hrs     URINE CULTURE  50,000-59,000 cfu/ml   --   --      Results from last 7 days   Lab Units 03/30/22 0429 03/28/22  1443   PROCALCITONIN ng/ml 3 57* 1 95*

## 2022-03-31 NOTE — PHYSICAL THERAPY NOTE
PHYSICAL THERAPY EVALUATION/TREATMENT     03/31/22 0930   Note Type   Note type Evaluation   Pain Assessment   Pain Assessment Tool 0-10   Pain Score No Pain   Restrictions/Precautions   Weight Bearing Precautions Per Order Yes   LUE Weight Bearing Per Order NWB   Braces or Orthoses Sling  (L UE)   Other Precautions Fall Risk;Pain;O2   Home Living   Type of [address] One level  (2 JESS)   Home Equipment [place]; Hemiwalker; Hospital bed   Prior Function   Level of [place] Needs assistance with ADLs and functional mobility  (since L reverse TSA 3/1/2022)   Lives With   (niece)   Receives Help From Family   ADL Assistance Needs assistance   General   Additional Pertinent History Pt admitted with generalized weakness with diagnosis sepsis  Pt had a fall with a shoulder fx with subsequent L reverse TSA 3/1/22 with loss of independence of functional mobility since then  Cognition   Overall Cognitive Status WFL   Subjective   Subjective "I want to get moving"   RLE Assessment   RLE Assessment   (ROM WFL, MMT 4-/5)   LLE Assessment   LLE Assessment   (ROM WFL , MMT 4-/5)   Transfers   Sit to Stand 4  Minimal assistance   Stand to Sit 4  Minimal assistance   Stand pivot 4  Minimal assistance   Ambulation/Elevation   Gait pattern Wide ONUR  (slow [place])   Gait Assistance 4  Minimal assist   Assistive Device   (hand held assist)   Distance 15 feet with 3L02   Balance   Static Sitting Fair +   Dynamic Sitting Fair   Static Standing Fair   Dynamic Standing Fair -   Ambulatory Fair -   Activity Tolerance   Activity Tolerance Patient limited by fatigue;Patient tolerated treatment well   Assessment   Prognosis Good   Problem List Decreased strength;Decreased endurance; Impaired balance;Decreased mobility;Obesity   Assessment Patient seen for Physical Therapy evaluation  Patient admitted with Sepsis ([facility] [address] )  Comorbidities affecting patient's physical performance include: DM, reverse TSA L, CHF, obesity  Personal factors affecting patient at time of initial evaluation include: ambulating with assistive device, stairs to enter home, inability to navigate level surfaces without external assistance, positive fall history and inability to live alone  Prior to admission, patient was requiring assist for functional mobility with cane since shoulder fx/reverse TSA  Please find objective findings from Physical Therapy assessment regarding body systems outlined above with impairments and limitations including weakness, impaired balance, impaired coordination, gait deviations, pain, decreased activity tolerance, decreased functional mobility tolerance, fall risk and SOB upon exertion  The Barthel Index was used as a functional outcome tool presenting with a score of Barthel Index Score: 50 today indicating marked limitations of functional mobility and ADLS  Patient's clinical presentation is currently unstable/unpredictable as seen in patient's presentation of changing level of pain, increased fall risk, new onset of impairment of functional mobility, decreased endurance and new onset of weakness  Pt would benefit from continued Physical Therapy treatment to address deficits as defined above and maximize level of functional mobility  As demonstrated by objective findings, the assigned level of complexity for this evaluation is high  The patient's AM-[facility] Basic Mobility Inpatient Short Form Raw Score is 15  A Raw score of less than or equal to 16 suggests the patient may benefit from discharge to post-acute rehabilitation services, however recommend home PT as pt has a very supportive niece and a first floor set up     Goals   Patient Goals "get stronger"   STG Expiration Date 04/07/22   Short Term Goal #1 Improved bed mobility to Min assist improved transfers to supervision, patient will ambulate with a vin walker 40 ft with supervision   LTG Expiration Date 04/14/22   Long Term Goal #1 Independent bed mobility, independent transfers, distant supervision ambulation with a vin walker 60 ft indoor level surfaces, supervision up and down 2 steps   Plan   Treatment/Interventions ADL retraining;Functional transfer training;LE strengthening/ROM; Elevations; Therapeutic exercise; Endurance training;Gait training;Bed mobility; Equipment eval/education;Patient/family training   PT Frequency Other (Comment)  (5w)   Recommendation   PT Discharge Recommendation Home with home health rehabilitation   Equipment Recommended   (pt has a cane, hemiwalker, 02, hospital bed)   AM-PAC Basic Mobility Inpatient   Turning in Bed Without Bedrails 2   Lying on Back to Sitting on Edge of Flat Bed 2   Moving Bed to Chair 3   Standing Up From Chair 3   Walk in Room 3   Climb 3-5 Stairs 2   Basic Mobility Inpatient Raw Score 15   Basic Mobility Standardized Score 36 97   Highest Level Of Mobility   -[facility] Goal 4: Move to chair/commode   -[facility] Highest Level of Mobility 7: Walk 25 feet or more   -[facility] Goal Achieved Yes   Barthel Index   Feeding 5   Bathing 0   Grooming Score 0   Dressing Score 5   Bladder Score 10   Bowels Score 10   Toilet Use Score 5   Transfers (Bed/Chair) Score 10   Mobility (Level Surface) Score 0   Stairs Score 5   Barthel Index Score 50   Additional Treatment Session   Start Time 0920   End Time 0930   Treatment Assessment S: "I haven't been up walking in a few days" O: Pt ambulated wtih R hand held assist x 10 feet, 2x 15 feet with a hemiwalker and min assist/supervision all with 3L02  Sp02 and HR stable with activity  A:  Pt tolerated activity well but fatigues easily  Quality of gait improved with practice  P:  Continue PT to improve endurance and independence with functional mobility  Defer to OT for L UE activity per Dr Eli Garcia protocol   End of Consult   Patient Position at End of Consult All needs within reach; Bedside chair   Licensure   [place] License Number  Mohan SAMMY [id number]

## 2022-03-31 NOTE — OCCUPATIONAL THERAPY NOTE
Occupational Therapy Evaluation/Treatment       03/31/22 1055   Note Type   Note type Evaluation   Restrictions/Precautions   Weight Bearing Precautions Per Order Yes   LUE Weight Bearing Per Order NWB   Braces or Orthoses Sling  (LUE)   Other Precautions Fall Risk;O2;Pain  (L shoulder precautions)   Pain Assessment   Pain Assessment Tool 0-10   Pain Score No Pain   Home Living   Type of [address] [place] [address] One level;Stairs to enter with rails  (2 JESS)   Bathroom Shower/Tub Tub/shower unit   Bathroom Toilet Standard   Bathroom Equipment Shower chair;Commode   Home Equipment Hemiwalker;Cane;Hospital bed   Additional Comments Patient presented to hospital with UTI and fever, weakness; pt is s/p L reverse TSA on 3/1/22 with Dr Ayush Boyd, since then with several hospital admissions   Prior Function   Level of [place] Needs assistance with ADLs and functional mobility  (Ambulatory with hemiwalker)   Lives With Family  (Niece)   Receives Help From Family   ADL Assistance Needs assistance   IADLs Needs assistance   Comments Since previous hospital admission patient has been staying at [facility];  Niece has been assisting patient with ADLs and sling management, pt reports being able to participate more in ADLs since last admission; per chart review Ortho notes report patient okay to start reverse TSA rehab protocol, was supposed to start with home OT however patient reports has not started yet   ADL   Eating Assistance 5  Supervision/Setup   Grooming Assistance 5  Supervision/Setup   UB Bathing Assistance 3  Moderate Assistance   LB Bathing Assistance 3  Moderate Assistance   UB Dressing Assistance 3  Moderate Assistance   LB Dressing Assistance 3  Moderate [address]  4  Minimal Assistance   Bed Mobility   Additional Comments Patient received seated OOB in chair   Transfers   Sit to Stand 4  Minimal assistance   Additional items Assist x 1   Stand to Sit 4  Minimal assistance   Additional items Assist x 1   Functional Mobility   Functional Mobility 4  Minimal assistance   Additional Comments Few steps with hemiwalker   Balance   Static Sitting Fair +   Dynamic Sitting Fair   Static Standing Fair   Dynamic Standing Fair -   Activity Tolerance   Activity Tolerance Patient limited by fatigue;Patient limited by pain   RUE Assessment   RUE Assessment WFL   LUE Assessment   LUE Assessment X   LUE Overall AROM   L Shoulder Flexion Note test, no AROM per protocol   L Elbow Flexion Moderately limited due to weakness, pt able to achieve approx  0-45*  (PROM WFL)   L Wrist Flexion WFL   L Wrist Extension WFL   L Wrist ABduction WFL   L Wrist ADduction WFL   L Mass Grasp WFL   L Finger Flexion WFL   L Finger Extension WFL   L Finger ABduction WFL   L Finger ADduction WFL   Cognition   Overall Cognitive Status WFL   Arousal/Participation Alert; Cooperative   Attention Within functional limits   Orientation Level Oriented X4   Following Commands Follows all commands and directions without difficulty   Assessment   Limitation Decreased ADL status; Decreased UE ROM; Decreased UE strength;Decreased Safe judgement during ADL;Decreased endurance;Decreased self-care trans;Decreased high-level ADLs   Prognosis Good   Assessment Patient evaluated by Occupational Therapy  Patient admitted with Sepsis ([facility] [address] )  The patients occupational profile, medical and therapy history includes a extensive additional review of physical, cognitive, or psychosocial history related to current functional performance  Comorbidities affecting functional mobility and ADLS include: asthma, DM, GERD, HLD, Afib, CKD, anemia, Covid 19, CHF, humeral fracture s/p L reverse TSA 3/1/22    Prior to admission, patient was independent with functional mobility with hemiwalker, requiring assist for ADLS, requiring assist for IADLS and home with family assist   The evaluation identifies the following performance deficits: weakness, decreased ROM, decreased endurance, increased fall risk, decreased ADLS, decreased IADLS, pain, decreased activity tolerance, decreased safety awareness and decreased strength, that result in activity limitations and/or participation restrictions  This evaluation requires clinical decision making of high complexity, because the patient presents with comorbidites that affect occupational performance and required significant modification of tasks or assistance with consideration of multiple treatment options  The Barthel Index was used as a functional outcome tool presenting with a score of Barthel Index Score: 50, indicating marked limitations of functional mobility and ADLS  The patient's raw score on the AM-PAC Daily Activity inpatient short form is 17, standardized score is 37 26, less than 39 4  Patients at this level are likely to benefit from DC to post-acute rehabilitation services  Please refer to the recommendation of the Occupational Therapist for safe DC planning  Despite AM-PAC score, patient at baseline was receiving assist with all ADLs from family, plan is to return home with continued care from niece, continued home OT services  Patient will benefit from skilled Occupational Therapy services to address above deficits and facilitate a safe return to prior level of function  Goals   Patient Goals 'to get this arm moving and stronger'   STG Time Frame   (1-7 days)   Short Term Goal  Goals established to promote Patient Goals: 'to get this arm moving and stronger':  Patient will increase standing tolerance to 5 minutes during ADL task to decrease assistance level and decrease fall risk; Patient will increase bed mobility to supervision in preparation for ADLS and transfers;  Patient will increase functional mobility to and from bathroom with vin-walker with supervision to increase performance with ADLS and to use a toilet; Patient will tolerate 5 minutes of UE ROM/strengthening per shoulder protocol to increase general activity tolerance and performance in ADLS/IADLS; Patient will improve functional activity tolerance to 5 minutes of sustained functional tasks to increase participation in basic self-care and decrease assistance level;  Patient will be able to to verbalize understanding and perform energy conservation/proper body mechanics during ADLS and functional mobility at least 75% of the time with minimal cueing to decrease signs of fatigue and increase stamina to return to prior level of function; Patient will increase dynamic sitting balance to fair+ to improve the ability to sit at edge of bed or on a chair for ADLS;  Patient will increase dynamic standing balance to fair to improve postural stability and decrease fall risk during standing ADLS and transfers  LTG Time Frame   (8-14 days)   Long Term Goal Patient will increase standing tolerance to 10 minutes during ADL task to decrease assistance level and decrease fall risk; Patient will increase bed mobility to independent in preparation for ADLS and transfers;  Patient will increase functional mobility to and from bathroom with vin-walker independently to increase performance with ADLS and to use a toilet; Patient will tolerate 10 minutes of UE ROM/strengthening per rehab protocol to increase general activity tolerance and performance in ADLS/IADLS; Patient will improve functional activity tolerance to 10 minutes of sustained functional tasks to increase participation in basic self-care and decrease assistance level;  Patient will be able to to verbalize understanding and perform energy conservation/proper body mechanics during ADLS and functional mobility at least 90% of the time with no cueing to decrease signs of fatigue and increase stamina to return to prior level of function; Patient will increase static/dynamic sitting balance to good to improve the ability to sit at edge of bed or on a chair for ADLS;  Patient will increase static/dynamic standing balance to fair+ to improve postural stability and decrease fall risk during standing ADLS and transfers  Pt will score >/= 21/24 on AM-PAC Daily Activity Inpatient scale to promote safe independence with ADLs and functional mobility; Pt will score >/= 80/100 on Barthel Index in order to decrease caregiver assistance needed and increase ability to perform ADLs and functional mobility  Functional Transfer Goals   Pt Will Perform All Functional Transfers   (STG supervision, LTG independent)   ADL Goals   Pt Will Perform Eating   (LTG independent)   Pt Will Perform Grooming   (LTG independent)   Pt Will Perform Bathing   (STG min assist, LTG supervision)   Pt Will Perform UE Dressing   (STG min assist, LTG supervision)   Pt Will Perform LE Dressing   (STG min assist, LTG supervision)   Pt Will Perform Toileting   (STG supervision, LTG independent)   Plan   Treatment Interventions ADL retraining;Functional transfer training;UE strengthening/ROM; Endurance training;Patient/family training;Equipment evaluation/education; Compensatory technique education;Continued evaluation; Energy conservation; Activityengagement   Goal Expiration Date 04/14/22   OT Frequency 3-5x/wk   Additional Treatment Session   Start Time 1045   End Time 1055   Treatment Assessment S: "I've been doing much better since last time I saw you" O: Pt doffed shoulder sling with mod assist; completed active ROM exercises for elbow/wrist/hand per protocol; needed AAROM for elbow flexion/extension due to weakness, able to complete active wrist flexion/extension, wrist ab/adduction, and gross finger flexion extension  Completed 10 reps x 2 of each  Don shoulder sling with max assist  Patient then performed sit to stand from recliner with supervision, ambulated short household distance in room with hemiwalker with supervision, stand to sit with supervision  A: Patient tolerated session well, on 2L O2 via nasal cannula, SpO2 >94% with all activity   Patient reports niece is still able to assist her with ADLs/iADLs as needed  At end of session, patient seated in recliner with all needs met   P: Home OT   Recommendation   OT Discharge Recommendation Home with home health rehabilitation   AM-PAC Daily Activity Inpatient   Lower Body Dressing 2   Bathing 2   Toileting 3   Upper Body Dressing 2   Grooming 4   Eating 4   Daily Activity Raw Score 17   Daily Activity Standardized Score (Calc for Raw Score >=11) 37 26   AM-PAC Applied Cognition Inpatient   Following a Speech/Presentation 4   Understanding Ordinary Conversation 4   Taking Medications 4   Remembering Where Things Are Placed or Put Away 4   Remembering List of 4-5 Errands 4   Taking Care of Complicated Tasks 4   Applied Cognition Raw Score 24   Applied Cognition Standardized Score 62 21   Barthel Index   Feeding 5   Bathing 0   Grooming Score 0   Dressing Score 5   Bladder Score 10   Bowels Score 10   Toilet Use Score 5   Transfers (Bed/Chair) Score 10   Mobility (Level Surface) Score 0   Stairs Score 5   Barthel Index Score 48   Licensure   [place] License Number  Alejandra Louis OTR/L [id number]

## 2022-03-31 NOTE — PLAN OF CARE
Problem: Potential for Falls  Goal: Patient will remain free of falls  Description: INTERVENTIONS:  - Educate patient/family on patient safety including physical limitations  - Instruct patient to call for assistance with activity   - Consult OT/PT to assist with strengthening/mobility   - Keep Call bell within reach  - Keep bed low and locked with side rails adjusted as appropriate  - Keep care items and personal belongings within reach  - Initiate and maintain comfort rounds  - Make Fall Risk Sign visible to staff  - Apply yellow socks and bracelet for high fall risk patients  - Consider moving patient to room near nurses station  Outcome: Progressing     Problem: MOBILITY - ADULT  Goal: Maintains/Returns to pre admission functional level  Description: INTERVENTIONS:  - Perform BMAT or MOVE assessment daily    - Set and communicate daily mobility goal to care team and patient/family/caregiver  - Collaborate with rehabilitation services on mobility goals if consulted  - Perform Range of Motion 3 times a day  - Reposition patient every 2  hours    - Dangle patient 3  times a day  - Stand patient 3  times a day  - Ambulate patient 3  times a day  - Out of bed to chair 3  times a day   - Out of bed for meals 3 times a day  - Record patient progress and toleration of activity level   Outcome: Progressing     Problem: Prexisting or High Potential for Compromised Skin Integrity  Goal: Skin integrity is maintained or improved  Description: INTERVENTIONS:  - Identify patients at risk for skin breakdown  - Assess and monitor skin integrity  - Assess and monitor nutrition and hydration status  - Monitor labs   - Assess for incontinence   - Turn and reposition patient  - Assist with mobility/ambulation  - Relieve pressure over bony prominences  - Avoid friction and shearing  - Provide appropriate hygiene as needed including keeping skin clean and dry  - Evaluate need for skin moisturizer/barrier cream  - Collaborate with interdisciplinary team   - Patient/family teaching  - Consider wound care consult   Outcome: Progressing     Problem: PAIN - ADULT  Goal: Verbalizes/displays adequate comfort level or baseline comfort level  Description: Interventions:  - Encourage patient to monitor pain and request assistance  - Assess pain using appropriate pain scale  - Administer analgesics based on type and severity of pain and evaluate response  - Implement non-pharmacological measures as appropriate and evaluate response  - Consider cultural and social influences on pain and pain management  - Notify physician/advanced practitioner if interventions unsuccessful or patient reports new pain  Outcome: Progressing     Problem: INFECTION - ADULT  Goal: Absence or prevention of progression during hospitalization  Description: INTERVENTIONS:  - Assess and monitor for signs and symptoms of infection  - Monitor lab/diagnostic results  - Monitor all insertion sites, i e  indwelling lines, tubes, and drains  - Monitor endotracheal if appropriate and nasal secretions for changes in amount and color  - [place] appropriate cooling/warming therapies per order  - Administer medications as ordered  - Instruct and encourage patient and family to use good hand hygiene technique  - Identify and instruct in appropriate isolation precautions for identified infection/condition  Outcome: Progressing     Problem: DISCHARGE PLANNING  Goal: Discharge to home or other facility with appropriate resources  Description: INTERVENTIONS:  - Identify barriers to discharge w/patient and caregiver  - Arrange for needed discharge resources and transportation as appropriate  - Identify discharge learning needs (meds, wound care, etc )  - Arrange for interpretive services to assist at discharge as needed  - Refer to Case Management Department for coordinating discharge planning if the patient needs post-hospital services based on physician/advanced practitioner order or complex needs related to functional status, cognitive ability, or social support system  Outcome: Progressing     Problem: Knowledge Deficit  Goal: Patient/family/caregiver demonstrates understanding of disease process, treatment plan, medications, and discharge instructions  Description: Complete learning assessment and assess knowledge base  Interventions:  - Provide teaching at level of understanding  - Provide teaching via preferred learning methods  Outcome: Progressing     Problem: Nutrition/Hydration-ADULT  Goal: Nutrient/Hydration intake appropriate for improving, restoring or maintaining nutritional needs  Description: Monitor and assess patient's nutrition/hydration status for malnutrition  Collaborate with interdisciplinary team and initiate plan and interventions as ordered  Monitor patient's weight and dietary intake as ordered or per policy  Utilize nutrition screening tool and intervene as necessary  Determine patient's food preferences and provide high-protein, high-caloric foods as appropriate       INTERVENTIONS:  - Monitor oral intake, urinary output, labs, and treatment plans  - Assess nutrition and hydration status and recommend course of action  - Evaluate amount of meals eaten  - Assist patient with eating if necessary   - Allow adequate time for meals  - Recommend/ encourage appropriate diets, oral nutritional supplements, and vitamin/mineral supplements  - Order, calculate, and assess calorie counts as needed  - Recommend, monitor, and adjust tube feedings and TPN/PPN based on assessed needs  - Assess need for intravenous fluids  - Provide specific nutrition/hydration education as appropriate  - Include patient/family/caregiver in decisions related to nutrition  Outcome: Progressing

## 2022-03-31 NOTE — ASSESSMENT & PLAN NOTE
Lab Results   Component Value Date    HGBA1C 6 8 (H) 03/09/2022       Recent Labs     03/30/22  1626 03/30/22 2050 03/31/22  0723 03/31/22  1118   POCGLU 242* 211* 177* 177*       Blood Sugar Average: Last 72 hrs:  (P) 166 [id number]     · Lantus 25 units HS  · Monitor Accu-Cheks, sliding scale for coverage

## 2022-03-31 NOTE — PROGRESS NOTES
[place] [address]  Progress Note Abril Mack 1950, 73 y o  female MRN: [id number]  Unit/Bed#: [address] [place] [address] Encounter: [id number]  Primary Care Provider: Basil Stanton MD   Date and time admitted to hospital: 3/28/2022  1:50 PM    * Sepsis [facility])  Assessment & Plan  This is a 70-year-old female with history of chronic diastolic CHF on 2 L supplemental oxygen, asthma, diabetes mellitus, GERD, hyperlipidemia, atrial fibrillation on Eliquis, SVT, CKD stage 3, anemia, recent left shoulder replacement on 03/01/2022 presented with generalized weakness, fever, fatigue with T-max of 102 9° at home  Patient was initially seen in the ED on 03/27/2022 for fevers and was discharged home on Keflex for possible UTI however returned with worsening symptoms  · Criteria met with leukocytosis, fever  · CT abdomen pelvis revealed decreased bilateral pleural effusions, no hydronephrosis or obstructive uropathy, no bowel obstruction or inflammatory process  · Chest x-ray revealed right IJ catheter, small right effusion  · UA positive asymptomatic - Stable appearing mild bilateral perinephric stranding on CT   · No evidence of erythema, tenderness or infection of right shoulder  · Elevated procalcitonin  · Infectious Disease consultation recommendations appreciated  · Patient had been on ceftriaxone, transition to oral Omnicef 300 mg p o  Q 12 hours for total 1 week antibiotic course through April 4th, 2020   · Blood cultures negative to date    Atrial fibrillation with rapid ventricular response (HCC)  Assessment & Plan  ·  likely secondary to sepsis   · responded to Cardizem 15 mg IV x 1   · change Lopressor to 25 mg p o  Q 6 hours due to soft BP  · Continue Eliquis  · Telemetry sinus rhythm to uncontrolled AFib      Chronic diastolic (congestive) heart failure [facility])  Assessment & Plan  Wt Readings from Last 3 Encounters:   03/31/22 121 kg (267 lb 3 2 oz)   03/27/22 117 kg (258 lb)   03/25/22 117 kg (259 lb)     · Appears euvolemic  · Echocardiogram 50% ejection fraction 03/10/2022  · ProBNP 42962, CT scan with improving pulmonary edema and bilateral effusions  · Nephrology consulted, patient had received gentle IV hydration; as per discussion with Nephrology may have to except slightly higher creatinine for patient to maintain euvolemia  Will restart patient's Demadex 10 mg p o  Daily and monitor response  · Daily weight and I & Os  Stage 3 chronic kidney disease [facility])  Assessment & Plan  Lab Results   Component Value Date    EGFR 26 03/31/2022    EGFR 21 03/30/2022    EGFR 24 03/29/2022    CREATININE 1 91 (H) 03/31/2022    CREATININE 2 25 (H) 03/30/2022    CREATININE 2 06 (H) 03/29/2022     Nephrology consulted  As discussed with Nephrology may have to accept slightly higher creatinine as patient's new baseline in order to maintain euvolemia  Repeat labs in a m   · CKD- 1 86 2 days ago and 2 05 on admission  · baseline creatinine appears to be around 1 4-1  6s  · Now with acute kidney injury likely secondary to sepsis  · Will hold diuretics  · Gentle IV fluids  · Urinary retention protocol  · Nephrology consultation will be appreciated  · Monitor BMP    Status post reverse total replacement of left shoulder  Assessment & Plan  · Underwent surgery on 3/1/22   · Xray 3/25 appears stable  · PT eval recommending home with home healthcare    Anemia  Assessment & Plan  baseline 8 5-8 9 g/dl   Currently 7 6,?   Dilutional secondary to patient receiving IV hydration  Will monitor H&H  No active signs of bleeding    Pulmonary nodules  Assessment & Plan  · Imaging revealed multiple pulmonary nodules  · Short-term follow-up with CT chest in 3 months    Morbid obesity with BMI of 45 0-49 9, adult (HCC)  Assessment & Plan  · Would benefit from diet and lifestyle modification    Diabetes mellitus, type 2 [facility])  Assessment & Plan  Lab Results   Component Value Date    HGBA1C 6 8 (H) 03/09/2022       Recent Labs     22  1626 22  2050 22  0723 22  1118   POCGLU 242* 211* 177* 177*       Blood Sugar Average: Last 72 hrs:  (P) 253 [id number]     · Lantus 25 units HS  · Monitor Accu-Cheks, sliding scale for coverage    Mixed hyperlipidemia  Assessment & Plan  · on lipitor inpatient   · crestor outpatient    Gastroesophageal reflux disease  Assessment & Plan  · PPI and mylanta           VTE Pharmacologic Prophylaxis: VTE Score: 11 High Risk (Score >/= 5) - Pharmacological DVT Prophylaxis Ordered: apixaban (Eliquis)  Sequential Compression Devices Ordered  Patient Centered Rounds: I performed bedside rounds with nursing staff today  Discussions with Specialists or Other Care Team Provider:  Multi disciplinary team    Education and Discussions with Family / Patient: Updated  (niece) via phone  Time Spent for Care: 30 minutes  More than 50% of total time spent on counseling and coordination of care as described above  Current Length of Stay: 2 day(s)  Current Patient Status: Inpatient   Certification Statement: The patient will continue to require additional inpatient hospital stay due to resumption diuretic, monitor kidney fx, abx  Discharge Plan: Anticipate discharge in 24-48 hrs to home with home services  Code Status: Level 1 - Full Code    Subjective:   Patient seen sitting up in chair watching TV  Reports that she needed to get out of the bed because she was uncomfortable, feels more comfortable in the chair  Was able to work with physical therapy, stated that she is motivated to work hard in order to get home  Denies any fevers or chills  Good appetite no nausea or vomiting      Objective:     Vitals:   Temp (24hrs), Av 3 °F (36 8 °C), Min:98 °F (36 7 °C), Max:98 8 °F (37 1 °C)    Temp:  [98 °F (36 7 °C)-98 8 °F (37 1 °C)] 98 2 °F (36 8 °C)  HR:  [73-83] 76  Resp:  [17-18] 18  BP: (104-154)/(49-78) 104/78  SpO2:  [97 %-100 %] 100 %  Body mass index is 48 87 kg/m²  Input and Output Summary (last 24 hours): Intake/Output Summary (Last 24 hours) at 3/31/2022 1404  Last data filed at 3/31/2022 1221  Gross per 24 hour   Intake 1595 ml   Output 1150 ml   Net 445 ml       Physical Exam:   Physical Exam  Vitals and nursing note reviewed  Constitutional:       General: She is not in acute distress  Appearance: She is obese  HENT:      Head: Normocephalic  Nose: Nose normal       Mouth/Throat:      Mouth: Mucous membranes are moist    Eyes:      Extraocular Movements: Extraocular movements intact  Conjunctiva/sclera: Conjunctivae normal       Pupils: Pupils are equal, round, and reactive to light  Cardiovascular:      Rate and Rhythm: Normal rate  Rhythm irregular  Pulses: Normal pulses  Heart sounds: Normal heart sounds  Pulmonary:      Effort: Pulmonary effort is normal       Breath sounds: Normal breath sounds  Abdominal:      General: Bowel sounds are normal  There is no distension  Palpations: Abdomen is soft  Tenderness: There is no abdominal tenderness  Genitourinary:     Comments: Voiding spontaneously  Musculoskeletal:      Cervical back: Normal range of motion  Right lower leg: Edema present  Left lower leg: Edema present  Comments: Left arm in sling   Skin:     General: Skin is warm and dry  Capillary Refill: Capillary refill takes less than 2 seconds  Neurological:      General: No focal deficit present  Mental Status: She is alert and oriented to person, place, and time  Psychiatric:         Mood and Affect: Mood normal          Behavior: Behavior normal          Thought Content:  Thought content normal          Judgment: Judgment normal          Additional Data:     Labs:  Results from last 7 days   Lab Units 03/31/22  0548   WBC Thousand/uL 11 46*   HEMOGLOBIN g/dL 7 6*   HEMATOCRIT % 25 0*   PLATELETS Thousands/uL 204   NEUTROS PCT % 72   LYMPHS PCT % 14   MONOS PCT % 8   EOS PCT % 5     Results from last 7 days   Lab Units 22  0548 22  0429 22  0440   SODIUM mmol/L 139   < > 136   POTASSIUM mmol/L 4 0   < > 4 3   CHLORIDE mmol/L 100   < > 99*   CO2 mmol/L 31   < > 34*   BUN mg/dL 46*   < > 35*   CREATININE mg/dL 1 91*   < > 2 06*   ANION GAP mmol/L 8   < > 3*   CALCIUM mg/dL 7 8*   < > 7 6*   ALBUMIN g/dL  --   --  2 2*   TOTAL BILIRUBIN mg/dL  --   --  0 36   ALK PHOS U/L  --   --  72   ALT U/L  --   --  15   AST U/L  --   --  12   GLUCOSE RANDOM mg/dL 193*   < > 250*    < > = values in this interval not displayed  Results from last 7 days   Lab Units 22  0440   INR  2 01*     Results from last 7 days   Lab Units 22  1118 22  0723 22  2050 22  1626 22  1113 22  0716 22  1946 22  1557 22  1136 22  0724 22  2333 22  2320   POC GLUCOSE mg/dl 177* 177* 211* 242* 251* 244* 389* 425* 290* 227* 399* 360*         Results from last 7 days   Lab Units 22  0429 22  1744 22  1443 22  2359   LACTIC ACID mmol/L  --  1 3  --  1 4   PROCALCITONIN ng/ml 3 57*  --  1 95*  --        Lines/Drains:  Invasive Devices  Report    Peripheral Intravenous Line            Long-Dwell Peripheral IV (Midline) 55/79/03 Right Cephalic 1 day          Drain            External Urinary Catheter 2 days                  Telemetry:  Telemetry Orders (From admission, onward)             48 Hour Telemetry Monitoring  Continuous x 48 hours           References:    Telemetry Guidelines   Question:  Reason for 48 Hour Telemetry  Answer:  Arrhythmias Requiring Medical Therapy (eg  SVT, Vtach/fib, Bradycardia, Uncontrolled A-fib)                 Telemetry Reviewed: sr  Indication for Continued Telemetry Use: No indication for continued use  Will discontinue  Imaging: No pertinent imaging reviewed      Recent Cultures (last 7 days):   Results from last 7 days   Lab Units 22  0154 03/28/22  0134 03/27/22  2359   BLOOD CULTURE   --  No Growth at 72 hrs  No Growth at 72 hrs  URINE CULTURE  50,000-59,000 cfu/ml   --   --        Last 24 Hours Medication List:   Current Facility-Administered Medications   Medication Dose Route Frequency Provider Last Rate    acetaminophen  650 mg Oral Q6H PRN Emelyn Peres MD      albuterol  2 puff Inhalation Q6H PRN Emelyn Peres MD      aluminum-magnesium hydroxide-simethicone  30 mL Oral Q6H PRN Emelyn Peres MD      amLODIPine  5 mg Oral Daily RK Clements      apixaban  5 mg Oral BID Emelyn Peres MD      atorvastatin  80 mg Oral Daily With Melody Gil MD      bisacodyl  10 mg Rectal Daily PRN Emelyn Peres MD      cefdinir  300 mg Oral Q12H [facility] Randal Gutierrez DO      docusate sodium  100 mg Oral Daily Emelyn Peres MD      insulin glargine  25 Units Subcutaneous HS Jetta Cogan, MD      insulin lispro  1-6 Units Subcutaneous HS Emelyn Peres MD      insulin lispro  2-12 Units Subcutaneous TID [facility] Emelyn Peres MD      insulin lispro  3 Units Subcutaneous TID With Meals RK Clements      metoprolol tartrate  25 mg Oral Q6H [facility] RK Clements      montelukast  10 mg Oral HS Emelyn Peres MD      ondansetron  4 mg Intravenous Q6H PRN Emelyn Peres MD      saccharomyces boulardii  250 mg Oral BID RK Clements      torsemide  10 mg Oral Daily Marcellus Good MD          Today, Patient Was Seen By: RK Styles    **Please Note: This note may have been constructed using a voice recognition system  **

## 2022-03-31 NOTE — CONSULTS
Cecilia [address]  73 y o  female MRN: [id number]  Unit/Bed#: [address] [address] Encounter: [id number]    ASSESSMENT and PLAN:  1  Elevated creatinine:  · Previous baseline creatinine was around 1 1 - 1 4  · She was recently admitted to [facility] and diagnosed with CHF between March 9 and March 22  During that time, her creatinine appears to have settled between 1 7 and 2 2 with diuresis and she was discharged on Torsemide 10 mg daily  · She now presents with a creatinine of 1 86 and her creatinine range since admission has been between 1 8 and 2 2   · I suspect that we are simply going to need to tolerate a higher creatinine range in order to keep her euvolemic  · Her renal function is stable today with a creatinine of 1 91   · I would recommend restarting torsemide 10 mg daily and monitor her renal function  · Urinalysis is nondiagnostic  Her renal ultrasound from March 15 showed no hydronephrosis  2  Chronic kidney disease, stage III:  · Previous baseline is 1 1 to 1 4    · As above, may need to tolerate a higher creatinine to keep euvolemic  · Probably new baseline is 1 8 to 2 2  · Will need renal follow up  3  Hypertension:  · Blood pressure is currently at goal   · Continue amlodipine 5 mg daily and metoprolol 25 mg QID  · Monitor with addition of Torsemide  4  Diastolic CHF  · Close to euvolemic  · Restart Torsemide 10 mg daily  5  Suspected UTI: on abx per ID  6  Atrial fibrillation with RVR  7  S/p L shoulder replacement  8  Obesity:    SUMMARY OF RECOMMENDATIONS:  · Restart Torsemide 10 mg OD  · Trend creatinine  The above plan was discussed with SLIM  Previous records were personally reviewed by me to obtain a baseline creatinine     The images (CXR and CT) were personally reviewed by me in PACS    HISTORY OF PRESENT ILLNESS:  Requesting Physician: Francisca Bowden MD  Reason for Consult: elevated creatinine    Evangelina Donaldson is a 73 y o  female who was admitted to [facility] on 3/28/22 after presenting with weakness  A renal consultation is requested today for assistance in the management of elevated creatinine  Larry Ferrari has a history of HTN, DM, HLP, CKD, CHF, atrial fibrillation, asthma, obesity  She follows with us in the office and was last seen by Babita Bynum in November 2019  Since that time, she has been lost to follow-up  Based on my personal review of the records, I note that her baseline creatinine appeared to be around 1 1- 1 4  She has been admitted twice to [facility] in March 2022 alone and this is her 3rd admission  During an admission from March 1 to March 7 for a L shoulder replacement, she had acute kidney injury with a creatinine as high as 2 30 on March 4  She was discharged improved on March 7, 2022 but came back on March 9  The 2nd admission lasted between March 9 and March 22 at which time she was diagnosed with congestive heart failure and was afforded intravenous diuretics  During the course of the most recent hospital stay, her creatinine went up to around 1 8 - 2 1 with diuresis  During the last few hospital days, her creatinine was between 1 7 and 2 2  Her creatinine on discharge was 2 10 on March 22, 2022 and she was discharged on torsemide 10 mg daily  She now presents back to [facility] with symptoms of weakness and was diagnosed with an infection felt to be of urinary source  She is currently being treated empirically for a urinary tract infection  On presentation, her creatinine was noted be 1 86 and has stayed between 1 91 and 2 06 this admission prompting renal consultation  She denied any NSAID use  She reports weakness, fatigue, fever, joint pain  She denied any nausea, vomiting, abdominal pain, diarrhea      PAST MEDICAL HISTORY:  Past Medical History:   Diagnosis Date    A-fib ([facility] [address] )     Anemia     Asthma     CHF (congestive heart failure) (HCC)     Chronic kidney disease     COVID-19 2022    Diabetes mellitus (HCC)     GERD (gastroesophageal reflux disease)     Hyperlipidemia     Kidney stones     PONV (postoperative nausea and vomiting)     SVT (supraventricular tachycardia) (HCC)      PAST SURGICAL HISTORY:  Past Surgical History:   Procedure Laterality Date    APPENDECTOMY      CYSTOSCOPY W/ LASER LITHOTRIPSY Left 2016    Procedure: CYSTOSCOPY URETEROSCOPY WITH LITHOTRIPSY HOLMIUM LASER, RETROGRADE PYELOGRAM AND INSERTION STENT URETERAL;  Surgeon: Justin Galicia MD;  Location: [address] [place] [address];  Service:     DILATION AND CURETTAGE OF UTERUS      IR THORACENTESIS  3/18/2022    JOINT REPLACEMENT      right knee    KNEE ARTHROPLASTY Right     MI CYSTOURETHROSCOPY,URETER CATHETER Left 2016    Procedure: CYSTOSCOPY RETROGRADE PYELOGRAM WITH INSERTION STENT URETERAL, left;  Surgeon: Justin Galicia MD;  Location: [address] [place] [address];  Service: Urology    MI RECONSTR TOTAL SHOULDER IMPLANT Left 3/1/2022    Procedure: ARTHROPLASTY SHOULDER REVERSE;  Surgeon: Angelica Kulkarni MD;  Location: [facility];  Service: Orthopedics    SHOULDER ARTHROTOMY Left      ALLERGIES:  Allergies   Allergen Reactions    Penicillins Hives    Moxifloxacin Other (See Comments)     unknown    Percocet [Oxycodone-Acetaminophen] Other (See Comments)     unknown    Zinc Acetate Other (See Comments)     unknown    Asa [Aspirin] GI Intolerance    Indocin [Indomethacin] Other (See Comments)     Made patient "loopy"    Other Other (See Comments)     unknown     SOCIAL HISTORY:  Social History     Substance and Sexual Activity   Alcohol Use Not Currently    Comment: rarely     Social History     Substance and Sexual Activity   Drug Use No     Social History     Tobacco Use   Smoking Status Former Smoker    Packs/day: 1 00    Years: 20 00    Pack years: 20 00    Types: Cigarettes    Quit date: 1996    Years since quittin 7   Smokeless Tobacco Never Used     FAMILY HISTORY:  Family History   Problem Relation Age of Onset    Heart disease Mother     Emphysema Maternal Grandmother     Heart disease Family      MEDICATIONS:    Current Facility-Administered Medications:     acetaminophen (TYLENOL) tablet 650 mg, 650 mg, Oral, Q6H PRN, Duarte Mott MD, 650 mg at 03/30/22 1437    albuterol (PROVENTIL HFA,VENTOLIN HFA) inhaler 2 puff, 2 puff, Inhalation, Q6H PRN, Duarte Mott MD    aluminum-magnesium hydroxide-simethicone (MYLANTA) oral suspension 30 mL, 30 mL, Oral, Q6H PRN, Duarte Mott MD    amLODIPine (NORVASC) tablet 5 mg, 5 mg, Oral, Daily, RK Clements, 5 mg at 03/30/22 8115    apixaban (ELIQUIS) tablet 5 mg, 5 mg, Oral, BID, Duarte Mott MD, 5 mg at 03/31/22 2860    atorvastatin (LIPITOR) tablet 80 mg, 80 mg, Oral, Daily With Sheri Schneider MD, 80 mg at 03/30/22 1719    bisacodyl (DULCOLAX) rectal suppository 10 mg, 10 mg, Rectal, Daily PRN, Duarte Mott MD    cefdinir (OMNICEF) capsule 300 mg, 300 mg, Oral, Q12H [facility]    docusate sodium (COLACE) capsule 100 mg, 100 mg, Oral, Daily, Duarte Mott MD, 100 mg at 03/31/22 0852    insulin glargine (LANTUS) subcutaneous injection 25 Units 0 25 mL, 25 Units, Subcutaneous, HS, Ac Salomon MD, 25 Units at 03/30/22 2120    insulin lispro (HumaLOG) 100 units/mL subcutaneous injection 1-6 Units, 1-6 Units, Subcutaneous, HS, Duarte Mott MD, 2 Units at 03/30/22 2120    insulin lispro (HumaLOG) 100 units/mL subcutaneous injection 2-12 Units, 2-12 Units, Subcutaneous, TID AC, 2 Units at 03/31/22 0748 **AND** Fingerstick Glucose (POCT), , , TID AC, Duarte Mott MD    insulin lispro (HumaLOG) 100 units/mL subcutaneous injection 3 Units, 3 Units, Subcutaneous, TID With Meals, RK Clements, 3 Units at 03/31/22 0748    metoprolol tartrate (LOPRESSOR) tablet 25 mg, 25 mg, Oral, Q6H [facility], RK Clements, 25 mg at 03/31/22 0532    montelukast (SINGULAIR) tablet 10 mg, 10 mg, Oral, HS, Gloria Akbar Brandon Harrell MD, 10 mg at 03/30/22 2120    ondansetron [facility] injection 4 mg, 4 mg, Intravenous, Q6H PRN, Tenisha Mccoy MD, 4 mg at 03/31/22 0532    saccharomyces boulardii (FLORASTOR) capsule 250 mg, 250 mg, Oral, BID, Osiris Palencia, RK, 250 mg at 03/31/22 5753    REVIEW OF SYSTEMS:  All the systems were reviewed and were negative except as documented on the HPI  PHYSICAL EXAM:  Current Weight: Weight - Scale: 121 kg (267 lb 3 2 oz)  First Weight: Weight - Scale: 117 kg (258 lb)  Vitals:    03/31/22 0309 03/31/22 0528 03/31/22 0600 03/31/22 0729   BP: 124/54 126/60  121/51   BP Location:       Pulse: 77 83  73   Resp: 18 18  18   Temp: 98 1 °F (36 7 °C) 98 4 °F (36 9 °C)  98 °F (36 7 °C)   TempSrc:  Oral  Axillary   SpO2: 98% 97%  97%   Weight:   121 kg (267 lb 3 2 oz)    Height:           Intake/Output Summary (Last 24 hours) at 3/31/2022 1100  Last data filed at 3/31/2022 0601  Gross per 24 hour   Intake 1995 ml   Output 750 ml   Net 1245 ml     Physical Exam  General: conscious, coherent, cooperative, not in distress  Skin: warm, dry, good turgor  Eyes: pale conjunctivae, no scleral icterus  ENT: moist lips and mucous membranes  Respiratory: equal chest expansion, clear breath sounds  Cardiovascular: distinct heart sounds, normal rate, regular rhythm, no rub  Abdomen: soft, non-tender, non-distended, normoactive bowel sounds  Extremities: (+) bilateral LE edema  Genitourinary: no oneal catheter  Neuro: awake, alert, oriented to time, place and person  Psych: appropriate affect        Lab Results:   Results from last 7 days   Lab Units 03/31/22  0548 03/30/22  0429 03/29/22  0440 03/28/22  1443 03/28/22  1443 03/27/22  2318 03/27/22  2318   WBC Thousand/uL 11 46* 15 64* 18 14*   < > 23 55*   < > 13 48*   HEMOGLOBIN g/dL 7 6* 8 0* 7 9*   < > 10 4*   < > 8 9*   HEMATOCRIT % 25 0* 27 3* 26 2*   < > 35 3   < > 29 9*   PLATELETS Thousands/uL 204 185 163   < > 189   < > 155   POTASSIUM mmol/L 4 0 4 0 4 3 < > 4 6   < > 4 1   CHLORIDE mmol/L 100 98* 99*   < > 96*   < > 96*   CO2 mmol/L 31 33* 34*   < > 34*   < > 35*   BUN mg/dL 46* 44* 35*   < > 31*   < > 31*   CREATININE mg/dL 1 91* 2 25* 2 06*   < > 2 05*   < > 1 86*   CALCIUM mg/dL 7 8* 7 6* 7 6*   < > 8 5   < > 7 8*   MAGNESIUM mg/dL  --  2 2 1 8  --   --   --  1 9   PHOSPHORUS mg/dL  --   --  2 8  --   --   --   --    ALK PHOS U/L  --   --  72  --  91  --  90   ALT U/L  --   --  15  --  18  --  20   AST U/L  --   --  12  --  15  --  12    < > = values in this interval not displayed  Other Studies:   Chest x-ray personally reviewed by me in PACS showed no vascular congestion  CT of the abdomen and pelvis showed no hydronephrosis but somewhat atrophic kidneys and some perinephric stranding

## 2022-03-31 NOTE — ASSESSMENT & PLAN NOTE
baseline 8 5-8 9 g/dl   Currently 7 6,?   Dilutional secondary to patient receiving IV hydration  Will monitor H&H  No active signs of bleeding

## 2022-03-31 NOTE — CASE MANAGEMENT
Case Management Assessment & Discharge Planning Note    Patient name Grayson Mantilla[address] Renzo Burks-* MRN [id number]  : 1950 Date 3/31/2022       Current Admission Date: 3/28/2022  Current Admission Diagnosis:Sepsis [facility])   Patient Active Problem List    Diagnosis Date Noted    Pulmonary nodules 2022    Chronic diastolic (congestive) heart failure ([facility] [facility] ) 2022    Atrial fibrillation with rapid ventricular response ([facility] ) 2022    Sepsis ([facility] ) 2022    Peripheral venous insufficiency 2022    Post-herpetic polyneuropathy 2022    Raynaud's disease 2022    Solitary pulmonary nodule 2022    Stage 3 chronic kidney disease ([facility] )     Acute respiratory failure with hypoxia ([facility] ) 2022    Acute diastolic congestive heart failure ([facility] ) 03/10/2022    Shortness of breath 2022    Status post reverse total replacement of left shoulder 2022    Constipation 2022    Closed 4-part fracture of proximal humerus, left, initial encounter 2022    PONV (postoperative nausea and vomiting) 2022    SEBASTIAN (acute kidney injury) ([facility] ) 2022    Diabetes mellitus, type 2 ([facility] ) 2022    Gastroesophageal reflux disease 2022    Nephrolithiasis 2022    Arthritis 2022    S/P total knee replacement, right 2022    On continuous oral anticoagulation - eliquis 2022    Humeral head fracture 2022    Essential hypertension 2019    Anemia 2019    Mixed hyperlipidemia 2019    Paroxysmal atrial fibrillation ([facility] ) 2019    Lower leg edema 2019    Asthma 2018    Morbid obesity with BMI of 45 0-49 9, adult ([facility] ) 2018      LOS (days): 2  Geometric Mean LOS (GMLOS) (days): 3 50  Days to GMLOS:1 7     OBJECTIVE:  PATIENT READMITTED TO HOSPITAL  Risk of Unplanned Readmission Score: 39       Current admission status: Inpatient  Referral Reason: Other (Discharge planning)    Preferred Pharmacy:   [address] ([address], [address] [facility] ([address])  [address] [address] [place] [address] [id number] [phone])  [place] [address]  Phone: [phone] Fax: [phone] [facility] [place] [address], [address] [place] [address]  20 [facility] [address]  Phone: [phone] Fax: [phone]    Primary Care Provider: Trevor Vásquez MD    Primary Insurance: MEDICARE  Secondary Insurance: CIG[employer]    ASSESSMENT:  Active Health Care Proxies     [facility] Representative - Relative   Primary Phone: [phone] (Mobile)                DISCHARGE DETAILS:    Discharge planning discussed with[de-identified] Patient  Freedom of Choice: Yes     Comments - Freedom of Choice: Patient has been recommended for discharge home with [address] when medically cleared  [facility]  SW placed referral in [facility]       CM contacted family/caregiver?: Yes  Were Treatment Team discharge recommendations reviewed with patient/caregiver?: Yes  Did patient/caregiver verbalize understanding of patient care needs?: Yes  Were patient/caregiver advised of the risks associated with not following Treatment Team discharge recommendations?: Yes    Contacts  Patient Contacts: Yina Bhakta (niece)  Relationship to Patient[de-identified] Family  Contact Method: Phone  Phone Number: [phone]  Reason/Outcome: Continuity of [address]         Is the patient interested in [address] at discharge?: Yes  [address] requested[de-identified] [address] Name[de-identified] [address] Burks Provider[de-identified] PCP  [address] Needed[de-identified] Evaluate Functional Status and Safety,Gait/ADL Training,Heart Failure Management,Oxygen Via Nasal Cannula,Restore Joint Function Post Joint Replacement,Strengthening/Theraputic Exercises to Improve Function,Wound/Ostomy Care  Oxygen LPM Ordered (if applicable based on home health services needed):: 2 LPM  Homebound Criteria Met[de-identified] Requires the Assistance of Another Person for Safe Ambulation or to Leave the Home,Uses an Assist Device (i e  cane, walker, etc)  Supporting Clincal Findings[de-identified] Limited Endurance,Requires Oxygen,Dyspnea with Exertion,Fatigues Easliy in [employer]    DME Referral Provided  Referral made for DME?: No    Other Referral/Resources/Interventions Provided:  Interventions: C  Programs[de-identified] CHF    Would you like to participate in our [address] service program?  : No - Declined    Treatment Team Recommendation: Home with [address]  Discharge Destination Plan[de-identified] Home with [place] at Discharge : Family      IMM Given (Date):: 03/31/22  IMM Given to[de-identified] Patient (IMM reviewed with and signed by patient  Copy given to patient and copy placed in scan bin for chart )      SANDRA spoke with patient at bedside and with carter Youssef [phone] by phone to discuss treatment team recommendations  Therapy has recommended that patient return home with [facility] and SANDRA reviewed PT evaluation with Dale Youssef  Patient and niece request referral to [facility] and SANDRA placed referral in [facility]  SANDRA also confirmed that patient has home O2 from [employer] [employer] and uses 2L NC at baseline  Patient has not yet been medically cleared for discharge  SW will continue to follow for discharge planning needs

## 2022-03-31 NOTE — ASSESSMENT & PLAN NOTE
· Underwent surgery on 3/1/22   · Xray 3/25 appears stable  · PT eval recommending home with home healthcare

## 2022-04-01 ENCOUNTER — APPOINTMENT (INPATIENT)
Dept: RADIOLOGY | Facility: HOSPITAL | Age: 72
DRG: 872 | End: 2022-04-01
Payer: MEDICARE

## 2022-04-01 LAB
ANION GAP SERPL CALCULATED.3IONS-SCNC: 6 MMOL/L (ref 4–13)
ATRIAL RATE: 111 BPM
ATRIAL RATE: 178 BPM
BUN SERPL-MCNC: 47 MG/DL (ref 5–25)
CALCIUM SERPL-MCNC: 8.3 MG/DL (ref 8.3–10.1)
CHLORIDE SERPL-SCNC: 101 MMOL/L (ref 100–108)
CO2 SERPL-SCNC: 32 MMOL/L (ref 21–32)
CREAT SERPL-MCNC: 1.81 MG/DL (ref 0.6–1.3)
ERYTHROCYTE [DISTWIDTH] IN BLOOD BY AUTOMATED COUNT: 15.5 % (ref 11.6–15.1)
GFR SERPL CREATININE-BSD FRML MDRD: 28 ML/MIN/1.73SQ M
GLUCOSE SERPL-MCNC: 144 MG/DL (ref 65–140)
GLUCOSE SERPL-MCNC: 158 MG/DL (ref 65–140)
GLUCOSE SERPL-MCNC: 176 MG/DL (ref 65–140)
GLUCOSE SERPL-MCNC: 180 MG/DL (ref 65–140)
GLUCOSE SERPL-MCNC: 200 MG/DL (ref 65–140)
HCT VFR BLD AUTO: 27.2 % (ref 34.8–46.1)
HGB BLD-MCNC: 7.9 G/DL (ref 11.5–15.4)
MAGNESIUM SERPL-MCNC: 2.5 MG/DL (ref 1.6–2.6)
MCH RBC QN AUTO: 28.5 PG (ref 26.8–34.3)
MCHC RBC AUTO-ENTMCNC: 29 G/DL (ref 31.4–37.4)
MCV RBC AUTO: 98 FL (ref 82–98)
P AXIS: 23 DEGREES
PLATELET # BLD AUTO: 239 THOUSANDS/UL (ref 149–390)
PMV BLD AUTO: 11.6 FL (ref 8.9–12.7)
POTASSIUM SERPL-SCNC: 4.5 MMOL/L (ref 3.5–5.3)
PR INTERVAL: 150 MS
PROCALCITONIN SERPL-MCNC: 1.17 NG/ML
QRS AXIS: -24 DEGREES
QRS AXIS: -36 DEGREES
QRSD INTERVAL: 76 MS
QRSD INTERVAL: 76 MS
QT INTERVAL: 280 MS
QT INTERVAL: 294 MS
QTC INTERVAL: 399 MS
QTC INTERVAL: 463 MS
RBC # BLD AUTO: 2.77 MILLION/UL (ref 3.81–5.12)
SODIUM SERPL-SCNC: 139 MMOL/L (ref 136–145)
T WAVE AXIS: 103 DEGREES
T WAVE AXIS: 66 DEGREES
VENTRICULAR RATE: 111 BPM
VENTRICULAR RATE: 165 BPM
WBC # BLD AUTO: 10.44 THOUSAND/UL (ref 4.31–10.16)

## 2022-04-01 PROCEDURE — 99232 SBSQ HOSP IP/OBS MODERATE 35: CPT | Performed by: NURSE PRACTITIONER

## 2022-04-01 PROCEDURE — 82948 REAGENT STRIP/BLOOD GLUCOSE: CPT

## 2022-04-01 PROCEDURE — 97535 SELF CARE MNGMENT TRAINING: CPT

## 2022-04-01 PROCEDURE — 93010 ELECTROCARDIOGRAM REPORT: CPT | Performed by: INTERNAL MEDICINE

## 2022-04-01 PROCEDURE — 71045 X-RAY EXAM CHEST 1 VIEW: CPT

## 2022-04-01 PROCEDURE — 84145 PROCALCITONIN (PCT): CPT | Performed by: NURSE PRACTITIONER

## 2022-04-01 PROCEDURE — 83735 ASSAY OF MAGNESIUM: CPT | Performed by: NURSE PRACTITIONER

## 2022-04-01 PROCEDURE — 99232 SBSQ HOSP IP/OBS MODERATE 35: CPT | Performed by: INTERNAL MEDICINE

## 2022-04-01 PROCEDURE — 80048 BASIC METABOLIC PNL TOTAL CA: CPT | Performed by: NURSE PRACTITIONER

## 2022-04-01 PROCEDURE — 97530 THERAPEUTIC ACTIVITIES: CPT

## 2022-04-01 PROCEDURE — 85027 COMPLETE CBC AUTOMATED: CPT | Performed by: NURSE PRACTITIONER

## 2022-04-01 PROCEDURE — 97110 THERAPEUTIC EXERCISES: CPT

## 2022-04-01 RX ORDER — METOPROLOL TARTRATE 50 MG/1
50 TABLET, FILM COATED ORAL EVERY 12 HOURS SCHEDULED
Status: DISCONTINUED | OUTPATIENT
Start: 2022-04-01 | End: 2022-04-02 | Stop reason: HOSPADM

## 2022-04-01 RX ORDER — INSULIN GLARGINE 100 [IU]/ML
27 INJECTION, SOLUTION SUBCUTANEOUS
Status: DISCONTINUED | OUTPATIENT
Start: 2022-04-01 | End: 2022-04-02 | Stop reason: HOSPADM

## 2022-04-01 RX ORDER — CEFDINIR 300 MG/1
300 CAPSULE ORAL EVERY 12 HOURS SCHEDULED
Status: DISCONTINUED | OUTPATIENT
Start: 2022-04-01 | End: 2022-04-02 | Stop reason: HOSPADM

## 2022-04-01 RX ADMIN — INSULIN LISPRO 4 UNITS: 100 INJECTION, SOLUTION INTRAVENOUS; SUBCUTANEOUS at 08:51

## 2022-04-01 RX ADMIN — DOCUSATE SODIUM 100 MG: 100 CAPSULE, LIQUID FILLED ORAL at 08:49

## 2022-04-01 RX ADMIN — Medication 250 MG: at 08:49

## 2022-04-01 RX ADMIN — METOPROLOL TARTRATE 50 MG: 50 TABLET, FILM COATED ORAL at 21:30

## 2022-04-01 RX ADMIN — APIXABAN 5 MG: 5 TABLET, FILM COATED ORAL at 18:35

## 2022-04-01 RX ADMIN — AMLODIPINE BESYLATE 5 MG: 5 TABLET ORAL at 08:49

## 2022-04-01 RX ADMIN — Medication 250 MG: at 18:35

## 2022-04-01 RX ADMIN — APIXABAN 5 MG: 5 TABLET, FILM COATED ORAL at 08:49

## 2022-04-01 RX ADMIN — METOPROLOL TARTRATE 25 MG: 25 TABLET, FILM COATED ORAL at 12:34

## 2022-04-01 RX ADMIN — TORSEMIDE 10 MG: 10 TABLET ORAL at 08:49

## 2022-04-01 RX ADMIN — CEFDINIR 300 MG: 300 CAPSULE ORAL at 21:30

## 2022-04-01 RX ADMIN — INSULIN GLARGINE 27 UNITS: 100 INJECTION, SOLUTION SUBCUTANEOUS at 21:30

## 2022-04-01 RX ADMIN — MONTELUKAST 10 MG: 10 TABLET, FILM COATED ORAL at 21:30

## 2022-04-01 RX ADMIN — INSULIN LISPRO 3 UNITS: 100 INJECTION, SOLUTION INTRAVENOUS; SUBCUTANEOUS at 12:34

## 2022-04-01 RX ADMIN — INSULIN LISPRO 3 UNITS: 100 INJECTION, SOLUTION INTRAVENOUS; SUBCUTANEOUS at 18:35

## 2022-04-01 RX ADMIN — INSULIN LISPRO 2 UNITS: 100 INJECTION, SOLUTION INTRAVENOUS; SUBCUTANEOUS at 12:34

## 2022-04-01 RX ADMIN — CEFDINIR 300 MG: 300 CAPSULE ORAL at 08:49

## 2022-04-01 RX ADMIN — INSULIN LISPRO 3 UNITS: 100 INJECTION, SOLUTION INTRAVENOUS; SUBCUTANEOUS at 08:52

## 2022-04-01 RX ADMIN — METOPROLOL TARTRATE 25 MG: 25 TABLET, FILM COATED ORAL at 06:22

## 2022-04-01 RX ADMIN — INSULIN LISPRO 1 UNITS: 100 INJECTION, SOLUTION INTRAVENOUS; SUBCUTANEOUS at 21:30

## 2022-04-01 RX ADMIN — ATORVASTATIN CALCIUM 80 MG: 80 TABLET, FILM COATED ORAL at 18:34

## 2022-04-01 RX ADMIN — ALBUTEROL SULFATE 2 PUFF: 90 AEROSOL, METERED RESPIRATORY (INHALATION) at 08:50

## 2022-04-01 NOTE — PROGRESS NOTES
[address] [facility] NOTE   Thea Dean 73 y o  female MRN: [id number]  Unit/Bed#: [address] [address] Encounter: [id number]  Reason for Consult: CKD    ASSESSMENT and PLAN:  1  Elevated creatinine:  · Previous baseline creatinine was around 1 1 - 1 4   · Suspect new baseline SCr is around 1 8 to 2 2 since admission for CHF in middle of March 2022  · Urinalysis is nondiagnostic  Her renal ultrasound from March 15 showed no hydronephrosis  · Stable renal function today - SCr 1 81    · Continue Torsemide 10 mg daily       2  Chronic kidney disease, stage III:  · Previous baseline creatinine is 1 1 to 1 4    · May need to tolerate a higher creatinine to keep euvolemic  · Probably new baseline is 1 8 to 2 2  Will need to continue to monitor       3  Hypertension:  · Blood pressure is occasionally low  · Amlodipine stopped today - agree  · Current Rx: Metoprolol 50 mg BID, Torsemide 10 mg OD  4  Diastolic CHF  · Close to euvolemic  · Continue Torsemide 10 mg daily  5  Sepsis: Due to suspected UTI - on abx per ID  6  Atrial fibrillation with RVR  7  S/p L shoulder replacement  8  Obesity:     DISPOSITION:  · Stable renal function  · We will likely need to tolerate a higher creatinine to maintain euvolemia  · Continue torsemide 10 mg daily  · Stable for discharge from renal standpoint  · If discharged, would recommend checking BMP in 1 week  · Will arrange for renal follow-up upon discharge  The above plan was discussed with SLIM  SUBJECTIVE / 24H INTERVAL HISTORY:  Fair appetite  No CP or SOB  UO 1250 cc       OBJECTIVE:  Current Weight: Weight - Scale: 121 kg (267 lb 3 2 oz)  Vitals:    03/31/22 2305 04/01/22 0614 04/01/22 0844 04/01/22 0845   BP: 128/50 146/68 (!) 109/47 (!) 109/47   BP Location:       Pulse: 72 77 73 73   Resp: 18 18     Temp: 98 4 °F (36 9 °C)      TempSrc: Oral      SpO2: 98% 98% 98% 98%   Weight:       Height:           Intake/Output Summary (Last 24 hours) at 4/1/2022 1033  Last data filed at 4/1/2022 0630  Gross per 24 hour   Intake 860 ml   Output 1250 ml   Net -390 ml     General: conscious, cooperative, no distress  Skin: dry  Eyes: pink conjunctivae  ENT: moist mucous membranes  Respiratory: equal chest expansion, clear breath sounds  Cardiovascular: distinct heart sounds, normal rate, regular rhythm, no rub  Abdomen: soft, non tender, non distended, normal bowel sounds  Extremities: mild LE edema - reports stable and chronic  Genitourinary: no oneal catheter  Neuro: awake, alert     Psych: appropriate affect    Medications:    Current Facility-Administered Medications:     acetaminophen (TYLENOL) tablet 650 mg, 650 mg, Oral, Q6H PRN, Roby Rodriguez MD, 650 mg at 03/30/22 1437    albuterol (PROVENTIL HFA,VENTOLIN HFA) inhaler 2 puff, 2 puff, Inhalation, Q6H PRN, Roby Rodriguez MD, 2 puff at 04/01/22 0850    aluminum-magnesium hydroxide-simethicone (MYLANTA) oral suspension 30 mL, 30 mL, Oral, Q6H PRN, Roby Rodriguez MD    apixaban Dalia Kulwinder) tablet 5 mg, 5 mg, Oral, BID, Roby Rodriguez MD, 5 mg at 04/01/22 0849    atorvastatin (LIPITOR) tablet 80 mg, 80 mg, Oral, Daily With Ciara Chow MD, 80 mg at 03/31/22 1721    bisacodyl (DULCOLAX) rectal suppository 10 mg, 10 mg, Rectal, Daily PRN, Roby Rodriguez MD    cefdinir (OMNICEF) capsule 300 mg, 300 mg, Oral, Q12H [address], Ahsrinivasan Pa, DO, 300 mg at 04/01/22 0849    docusate sodium (COLACE) capsule 100 mg, 100 mg, Oral, Daily, Roby Rodriguez MD, 100 mg at 04/01/22 0849    insulin glargine (LANTUS) subcutaneous injection 25 Units 0 25 mL, 25 Units, Subcutaneous, HS, Bud Daniel MD, 25 Units at 03/31/22 2110    insulin lispro (HumaLOG) 100 units/mL subcutaneous injection 1-6 Units, 1-6 Units, Subcutaneous, HS, Roby Rodriguez MD, 1 Units at 03/31/22 2136    insulin lispro (HumaLOG) 100 units/mL subcutaneous injection 2-12 Units, 2-12 Units, Subcutaneous, TID AC, 4 Units at 04/01/22 0851 **AND** Fingerstick Glucose (POCT), , , TID AC, Reji Steve MD    insulin lispro (HumaLOG) 100 units/mL subcutaneous injection 3 Units, 3 Units, Subcutaneous, TID With Meals, RK Clements, 3 Units at 04/01/22 0852    metoprolol tartrate (LOPRESSOR) tablet 25 mg, 25 mg, Oral, Q12H SELMA, RK Clements    metoprolol tartrate (LOPRESSOR) tablet 50 mg, 50 mg, Oral, Q12H SELMA, RK Clements    montelukast (SINGULAIR) tablet 10 mg, 10 mg, Oral, HS, Reji Steve MD, 10 mg at 03/31/22 2110    ondansetron (ZOFRAN) injection 4 mg, 4 mg, Intravenous, Q6H PRN, Reji Steve MD, 4 mg at 03/31/22 0532    saccharomyces boulardii (FLORASTOR) capsule 250 mg, 250 mg, Oral, BID, RK Clements, 250 mg at 04/01/22 0849    torsemide (DEMADEX) tablet 10 mg, 10 mg, Oral, Daily, Kaitlyn Salvador MD, 10 mg at 04/01/22 0849    Laboratory Results:  Results from last 7 days   Lab Units 04/01/22  0627 03/31/22  0548 03/30/22  0429 03/29/22  0440 03/28/22  1443 03/27/22  2318   WBC Thousand/uL 10 44* 11 46* 15 64* 18 14* 23 55* 13 48*   HEMOGLOBIN g/dL 7 9* 7 6* 8 0* 7 9* 10 4* 8 9*   HEMATOCRIT % 27 2* 25 0* 27 3* 26 2* 35 3 29 9*   PLATELETS Thousands/uL 239 204 185 163 189 155   POTASSIUM mmol/L 4 5 4 0 4 0 4 3 4 6 4 1   CHLORIDE mmol/L 101 100 98* 99* 96* 96*   CO2 mmol/L 32 31 33* 34* 34* 35*   BUN mg/dL 47* 46* 44* 35* 31* 31*   CREATININE mg/dL 1 81* 1 91* 2 25* 2 06* 2 05* 1 86*   CALCIUM mg/dL 8 3 7 8* 7 6* 7 6* 8 5 7 8*   MAGNESIUM mg/dL 2 5  --  2 2 1 8  --  1 9   PHOSPHORUS mg/dL  --   --   --  2 8  --   --

## 2022-04-01 NOTE — PROGRESS NOTES
[address]  Progress Note Sheri Meza 1950, 73 y o  female MRN: [id number]  Unit/Bed#: [address] [place] [address] Encounter: [id number]  Primary Care Provider: Shadi Duffy MD   Date and time admitted to hospital: 3/28/2022  1:50 PM    * Sepsis [facility])  Assessment & Plan  This is a 73-year-old female with history of chronic diastolic CHF on 3 L supplemental oxygen, asthma, diabetes mellitus, GERD, hyperlipidemia, atrial fibrillation on Eliquis, SVT, CKD stage 3, anemia, recent left shoulder replacement on 03/01/2022 presented with generalized weakness, fever, fatigue with T-max of 102 9° at home  Patient was initially seen in the ED on 03/27/2022 for fevers and was discharged home on Keflex for possible UTI however returned with worsening symptoms  · Criteria met with leukocytosis, fever, suspect complicated UTI  · CT abdomen pelvis revealed decreased bilateral pleural effusions, no hydronephrosis or obstructive uropathy, no bowel obstruction or inflammatory process  · Chest x-ray revealed right IJ catheter, small right effusion  · UA positive asymptomatic - Stable appearing mild bilateral perinephric stranding on CT   · No evidence of erythema, tenderness or infection of left shoulder  · Elevated procalcitonin  · Infectious Disease consultation recommendations appreciated  · Patient had been on ceftriaxone, transitioned to oral Omnicef 300 mg p o  Q 12 hours for total 1 week antibiotic course through April 4th  · Blood cultures negative to date    Atrial fibrillation with rapid ventricular response (HCC)  Assessment & Plan  · likely secondary to sepsis   · responded to Cardizem 15 mg IV x 1   · changed Lopressor to 25 mg p o  Q 6 hours due to soft BP  Will change back to 50 mg bid for discharge planning  · Continue Eliquis  · Telemetry sinus rhythm to uncontrolled AFib  Discontinued    · Optimize electrolytes    Chronic diastolic (congestive) heart failure (HCC)  Assessment & Plan  Wt Readings from Last 3 Encounters:   03/31/22 121 kg (267 lb 3 2 oz)   03/27/22 117 kg (258 lb)   03/25/22 117 kg (259 lb)     · Appears euvolemic  · Echocardiogram 50% ejection fraction 03/10/2022  · ProBNP 3733, CT scan with improving pulmonary edema and bilateral effusions  · Nephrology consulted, patient had received gentle IV hydration; as per discussion with Nephrology may have to except slightly higher creatinine for patient to maintain euvolemia  Restarted patient's Demadex 10 mg p o  Daily yesterday  ·  Daily weight and I & Os  · Check chest x-ray to rule out CHF  Stage 3 chronic kidney disease [facility])  Assessment & Plan  Lab Results   Component Value Date    EGFR 28 04/01/2022    EGFR 26 03/31/2022    EGFR 21 03/30/2022    CREATININE 1 81 (H) 04/01/2022    CREATININE 1 91 (H) 03/31/2022    CREATININE 2 25 (H) 03/30/2022     Nephrology consulted  As discussed with Nephrology may have to accept slightly higher creatinine as patient's new baseline in order to maintain euvolemia  · CKD- 1 86 2 days ago and 2 05 on admission  · baseline creatinine appears to be around 1 4-1  6s  · Now with elevated creatinine likely secondary to sepsis  · Diuretic was held initially  Received Gentle IV fluids  · Urinary retention protocol  · Nephrology consultation appreciated  · Resumed Demadex yesterday  · Creatinine today 1 81   · Nephrology recommend checking BMP 1 week, outpatient follow-up      Pulmonary nodules  Assessment & Plan  · Imaging revealed multiple pulmonary nodules  · Short-term follow-up with CT chest in 3 months    Status post reverse total replacement of left shoulder  Assessment & Plan  · Underwent surgery on 3/1/22   · Xray 3/25 appears stable  · PT eval recommending home with home healthcare    Gastroesophageal reflux disease  Assessment & Plan  · PPI and mylanta     Diabetes mellitus, type 2 [facility])  Assessment & Plan  Lab Results   Component Value Date    HGBA1C 6 8 (H) 03/09/2022 Recent Labs     22  1622 22  2135 22  0737 22  1201   POCGLU 204* 183* 200* 176*       Blood Sugar Average: Last 72 hrs:  (P) 242 [id number]     · Lantus 25 units HS  · Blood sugar slightly elevated  Will increase Lantus to 27 units subQ HS  · Monitor Accu-Cheks, sliding scale for coverage, mealtime insulin  Anemia  Assessment & Plan  baseline hemoglobin 8 5-8 9 g/dl   Currently 7 9,? Dilutional secondary to patient receiving IV hydration  Will monitor H&H  No active signs of bleeding    Mixed hyperlipidemia  Assessment & Plan  · on lipitor inpatient   · crestor outpatient    Morbid obesity with BMI of 45 0-49 9, adult ([facility] )  Assessment & Plan  · Would benefit from diet and lifestyle modification        VTE Pharmacologic Prophylaxis:   Pharmacologic: Apixaban (Eliquis)  Mechanical VTE Prophylaxis in Place: Yes    Patient Centered Rounds: I have performed bedside rounds with nursing staff today  Discussions with Specialists or Other Care Team Provider:  Nephrology    Education and Discussions with Family / Patient:  Yes    Time Spent for Care: 20 minutes  More than 50% of total time spent on counseling and coordination of care as described above  Current Length of Stay: 3 day(s)    Current Patient Status: Inpatient   Certification Statement: The patient will continue to require additional inpatient hospital stay due to Sepsis, complicated UTI    Discharge Plan:  Home in a   With VNA    Code Status: Level 1 - Full Code      Subjective:   Patient reports feeling congested overnight likely due to allergy  Denies SOB, cough, chest pain, headache, dizziness, nausea vomiting, diarrhea, constipation, fever, chills      Objective:     Vitals:   Temp (24hrs), Av 5 °F (36 9 °C), Min:98 4 °F (36 9 °C), Max:98 6 °F (37 °C)    Temp:  [98 4 °F (36 9 °C)-98 6 °F (37 °C)] 98 4 °F (36 9 °C)  HR:  [72-79] 79  Resp:  [18-19] 19  BP: (109-146)/(47-68) 119/47  SpO2:  [98 %-100 %] 100 %  Body mass index is 48 87 kg/m²  Input and Output Summary (last 24 hours): Intake/Output Summary (Last 24 hours) at 4/1/2022 1525  Last data filed at 4/1/2022 0630  Gross per 24 hour   Intake 620 ml   Output 850 ml   Net -230 ml       Physical Exam:     Physical Exam  Vitals and nursing note reviewed  Constitutional:       Appearance: She is well-developed  She is obese  HENT:      Head: Normocephalic and atraumatic  Neck:      Thyroid: No thyromegaly  Vascular: No JVD  Trachea: No tracheal deviation  Cardiovascular:      Rate and Rhythm: Normal rate and regular rhythm  Heart sounds: Normal heart sounds  Pulmonary:      Effort: Pulmonary effort is normal  No respiratory distress  Breath sounds: No wheezing or rales  Comments: Diminished BS bilateral, on O2 3 L, satting well  Abdominal:      General: Bowel sounds are normal  There is no distension  Palpations: Abdomen is soft  Tenderness: There is no abdominal tenderness  There is no guarding  Musculoskeletal:         General: No swelling or deformity  Cervical back: Neck supple  Right lower leg: No edema  Left lower leg: No edema  Comments: On sling to left shoulder  Skin:     General: Skin is warm and dry  Neurological:      General: No focal deficit present  Mental Status: She is alert and oriented to person, place, and time  Psychiatric:         Mood and Affect: Mood normal          Judgment: Judgment normal          Additional Data:     Labs:    Results from last 7 days   Lab Units 04/01/22  0627 03/31/22  0548 03/31/22  0548   WBC Thousand/uL 10 44*   < > 11 46*   HEMOGLOBIN g/dL 7 9*   < > 7 6*   HEMATOCRIT % 27 2*   < > 25 0*   PLATELETS Thousands/uL 239   < > 204   NEUTROS PCT %  --   --  72   LYMPHS PCT %  --   --  14   MONOS PCT %  --   --  8   EOS PCT %  --   --  5    < > = values in this interval not displayed       Results from last 7 days   Lab Units 04/01/22  5067 03/30/22  0429 03/29/22  0440   POTASSIUM mmol/L 4 5   < > 4 3   CHLORIDE mmol/L 101   < > 99*   CO2 mmol/L 32   < > 34*   BUN mg/dL 47*   < > 35*   CREATININE mg/dL 1 81*   < > 2 06*   CALCIUM mg/dL 8 3   < > 7 6*   ALK PHOS U/L  --   --  72   ALT U/L  --   --  15   AST U/L  --   --  12    < > = values in this interval not displayed  Results from last 7 days   Lab Units 03/29/22  0440   INR  2 01*       * I Have Reviewed All Lab Data Listed Above  * Additional Pertinent Lab Tests Reviewed: [address] Admission Reviewed    Imaging:    Imaging Reports Reviewed Today Include:  Chest x-ray  Imaging Personally Reviewed by Myself Includes:  None    Recent Cultures (last 7 days):     Results from last 7 days   Lab Units 03/28/22  0152 03/28/22  0134 03/27/22  2359   BLOOD CULTURE   --  No Growth After 4 Days  No Growth After 4 Days     URINE CULTURE  50,000-59,000 cfu/ml   --   --        Last 24 Hours Medication List:   Current Facility-Administered Medications   Medication Dose Route Frequency Provider Last Rate    acetaminophen  650 mg Oral Q6H PRN Duarte Mott MD      albuterol  2 puff Inhalation Q6H PRN Duarte Mott MD      aluminum-magnesium hydroxide-simethicone  30 mL Oral Q6H PRN Duarte Mott MD      apixaban  5 mg Oral BID Duarte Mott MD      atorvastatin  80 mg Oral Daily With Sunny Savage MD      bisacodyl  10 mg Rectal Daily PRN Duarte Mott MD      cefdinir  300 mg Oral Q12H [address] RK Parks      docusate sodium  100 mg Oral Daily Duarte Mott MD      insulin glargine  27 Units Subcutaneous HS RK Clements      insulin lispro  1-6 Units Subcutaneous HS Duarte Mott MD      insulin lispro  2-12 Units Subcutaneous TID [facility] Duarte Mott MD      insulin lispro  3 Units Subcutaneous TID With Meals RK Clements      metoprolol tartrate  25 mg Oral Q12H [address] Cuiyin Yurik, CRNP      metoprolol tartrate  50 mg Oral Q12H [address], RK Hodges montelukast  10 mg Oral HS Dillon Martinez MD      ondansetron  4 mg Intravenous Q6H PRN Dillon Martinez MD      saccharomyces boulardii  250 mg Oral BID RK Clements      torsemide  10 mg Oral Daily Jaleesa Fernández MD          Today, Patient Was Seen By: RK Parks    ** Please Note: Dragon 360 Dictation voice to text software may have been used in the creation of this document   **

## 2022-04-01 NOTE — ASSESSMENT & PLAN NOTE
baseline hemoglobin 8 5-8 9 g/dl   Currently 7 9,?   Dilutional secondary to patient receiving IV hydration  Will monitor H&H  No active signs of bleeding

## 2022-04-01 NOTE — PLAN OF CARE
Problem: MOBILITY - ADULT  Goal: Maintain or return to baseline ADL function  Description: INTERVENTIONS:  -  Assess patient's ability to carry out ADLs; assess patient's baseline for ADL function and identify physical deficits which impact ability to perform ADLs (bathing, care of mouth/teeth, toileting, grooming, dressing, etc )  - Assess/evaluate cause of self-care deficits   - Assess range of motion  - Assess patient's mobility; develop plan if impaired  - Assess patient's need for assistive devices and provide as appropriate  - Encourage maximum independence but intervene and supervise when necessary  - Involve family in performance of ADLs  - Assess for home care needs following discharge   - Consider OT consult to assist with ADL evaluation and planning for discharge  - Provide patient education as appropriate  4/1/2022 1655 by Emelia Pineda RN  Outcome: Progressing  4/1/2022 1650 by Emelia Pineda RN  Outcome: Progressing  Goal: Maintains/Returns to pre admission functional level  Description: INTERVENTIONS:  - Perform BMAT or MOVE assessment daily    - Set and communicate daily mobility goal to care team and patient/family/caregiver  - Collaborate with rehabilitation services on mobility goals if consulted  - Perform Range of Motion tid times a day  - Reposition patient every 2 hours    - Dangle patient tid times a day  - Stand patient tid  times a day  - Ambulate patient tid  times a day  - Out of bed to chair tid times a day   - Out of bed for meals tid  times a day  - Out of bed for toileting  - Record patient progress and toleration of activity level   4/1/2022 1655 by Emelia Pineda RN  Outcome: Progressing  4/1/2022 1650 by Emelia Pineda RN  Outcome: Progressing

## 2022-04-01 NOTE — ASSESSMENT & PLAN NOTE
· likely secondary to sepsis   · responded to Cardizem 15 mg IV x 1   · changed Lopressor to 25 mg p o  Q 6 hours due to soft BP  Will change back to 50 mg bid for discharge planning  · Continue Eliquis  · Telemetry sinus rhythm to uncontrolled AFib  Discontinued    · Optimize electrolytes

## 2022-04-01 NOTE — PLAN OF CARE
Problem: Potential for Falls  Goal: Patient will remain free of falls  Description: INTERVENTIONS:  - Educate patient/family on patient safety including physical limitations  - Instruct patient to call for assistance with activity   - Consult OT/PT to assist with strengthening/mobility   - Keep Call bell within reach  - Keep bed low and locked with side rails adjusted as appropriate  - Keep care items and personal belongings within reach  - Initiate and maintain comfort rounds  - Make Fall Risk Sign visible to staff  - Offer Toileting every 2 Hours, in advance of need  - Initiate/Maintain bed/chair alarm  - Obtain necessary fall risk management equipment: walker  - Apply yellow socks and bracelet for high fall risk patients  - Consider moving patient to room near nurses station  4/1/2022 1655 by Emelia Jamison RN  Outcome: Progressing  4/1/2022 1650 by Emelia Jamison RN  Outcome: Progressing

## 2022-04-01 NOTE — ASSESSMENT & PLAN NOTE
Lab Results   Component Value Date    HGBA1C 6 8 (H) 03/09/2022       Recent Labs     03/31/22  1622 03/31/22  2135 04/01/22  0737 04/01/22  1201   POCGLU 204* 183* 200* 176*       Blood Sugar Average: Last 72 hrs:  (P) 242 [id number]     · Lantus 25 units HS  · Blood sugar slightly elevated  Will increase Lantus to 27 units subQ HS  · Monitor Accu-Cheks, sliding scale for coverage, mealtime insulin

## 2022-04-01 NOTE — CASE MANAGEMENT
Case Management Discharge Planning Note    Patient name Juan Diego Valenzuela [phone] MRN [id number]  : 1950 Date 2022       Current Admission Date: 3/28/2022  Current Admission Diagnosis:Sepsis [facility])   Patient Active Problem List    Diagnosis Date Noted    Pulmonary nodules 2022    Chronic diastolic [facility]congestive) [facility] failure [facility] ) 2022    Atrial fibrillation with rapid ventricular response ([facility] ) 2022    Sepsis ([facility] ) 2022    Peripheral venous insufficiency 2022    Post-herpetic polyneuropathy 2022    Raynaud's disease 2022    Solitary pulmonary nodule 2022    Stage 3 chronic kidney disease ([facility] )     Acute respiratory failure with hypoxia ([facility] ) 2022    Acute diastolic congestive heart failure ([facility] ) 03/10/2022    Shortness of breath 2022    Status post reverse total replacement of left shoulder 2022    Constipation 2022    Closed 4-part fracture of proximal humerus, left, initial encounter 2022    PONV (postoperative nausea and vomiting) 2022    SEBASTIAN (acute kidney injury) ([facility] ) 2022    Diabetes mellitus, type 2 ([place] [address] ) 2022    Gastroesophageal reflux disease 2022    Nephrolithiasis 2022    Arthritis 2022    S/P total knee replacement, right 2022    On continuous oral anticoagulation - eliquis 2022    Humeral head fracture 2022    Essential hypertension 2019    Anemia 2019    Mixed hyperlipidemia 2019    Paroxysmal atrial fibrillation ([facility] ) 2019    Lower leg edema 2019    Asthma 2018    Morbid obesity with BMI of 45 0-49 9, adult ([facility] ) 2018      LOS (days): 3  Geometric Mean LOS (GMLOS) (days): 3 50  Days to GMLOS:0 5     OBJECTIVE:  Risk of Unplanned Readmission Score: 30       Current admission status: Inpatient   Preferred Pharmacy:   [facility] ([address] [facility], [facility] - [address] [address] [place] [address] [phone])  [place] [address]  Phone: [phone] Fax: [phone] [facility] [place] [address]  Phone: [phone] Fax: [phone]    Primary Care Provider: Kelsey Wright MD    Primary Insurance: MEDICARE  Secondary Insurance: [employer]    DISCHARGE DETAILS:    Discharge planning discussed with[de-identified] Patient  Freedom of Choice: Yes     Comments - Freedom of Choice: [facility]  Were Treatment Team discharge recommendations reviewed with patient/caregiver?: Yes  Did patient/caregiver verbalize understanding of patient care needs?: Yes  Were patient/caregiver advised of the risks associated with not following Treatment Team discharge recommendations?: Yes    Contacts  Reason/Outcome: Discharge Planning    Other Referral/Resources/Interventions Provided:  Interventions: [facility]    Would you like to participate in our [address] service program?  : No - Declined    Treatment Team Recommendation: Home with [address]  Discharge Destination Plan[de-identified] Home with [address]      IMM Given (Date):: 04/01/22  IMM Given to[de-identified] Patient (IMM reviewed with and signed by patient    Patient declined copy and copy was placed in scan bin for chart )

## 2022-04-01 NOTE — OCCUPATIONAL THERAPY NOTE
OT TREATMENT       04/01/22 1102   Note Type   Note Type Treatment   Restrictions/Precautions   LUE Weight Bearing Per Order NWB   Braces or Orthoses Sling   Other Precautions Chair Alarm; Bed Alarm; Fall Risk;O2   Pain Assessment   Pain Assessment Tool 0-10   Pain Score No Pain   ADL   Eating Assistance 5  Supervision/Setup   Eating Deficit Beverage management   Grooming Assistance 5  Supervision/Setup   Grooming Deficit Brushing hair   Grooming Comments Seated in recliner chair    Toileting Assistance  2  Maximal Assistance   Toileting Deficit Perineal hygiene   Toileting Comments BM on commode    Transfers   Sit to Stand 5  Supervision   Stand to Sit 5  Supervision   Toilet transfer 5  Supervision   Additional items Commode   Functional Mobility   Functional Mobility 5  Supervision   Additional Comments 15 feet x 2   Additional items Hemiwalker   ROM- Right Upper Extremities   R Shoulder AROM; Flexion; Horizontal ABduction   R Elbow AROM;Elbow flexion;Elbow extension   R Hand AROM; Index finger; Thumb; Long finger;Ring finger;Little finger   R Weight/Reps/Sets 10 times each seated in chair    Cognition   Overall Cognitive Status WFL   Arousal/Participation Cooperative   Attention Within functional limits   Orientation Level Oriented X4   Following Commands Follows all commands and directions without difficulty   Assessment   Assessment Patient seen for OT treatment  Patient requires max assist for BM hygiene  Patient is supervision for transfers and functional mobility and has a niece who has been assisting with ADLS at home  Patient will benefit from continued OT serives to maximize functional performance with ADLS  The patient's raw score on the AM-PAC Daily Activity inpatient short form is 17, standardized score is 37 26, less than 39 4   Patients at this level are likely to benefit from DC to post-acute rehabilitation services, however pts niece is able to assist with all ADLS at home, therefore plan for home OT/PT  Please refer to the recommendation of the Occupational Therapist for safe DC planning  Plan   Treatment Interventions ADL retraining;Functional transfer training;UE strengthening/ROM; Endurance training;Patient/family training;Equipment evaluation/education; Activityengagement; Compensatory technique education   OT Frequency 3-5x/wk   Recommendation   OT Discharge Recommendation Home with home health rehabilitation   AM-PAC Daily Activity Inpatient   Lower Body Dressing 2   Bathing 2   Toileting 3   Upper Body Dressing 2   Grooming 4   Eating 4   Daily Activity Raw Score 17   Daily Activity Standardized Score (Calc for Raw Score >=11) 37 26   AM-PAC Applied Cognition Inpatient   Following a Speech/Presentation 4   Understanding Ordinary Conversation 4   Taking Medications 4   Remembering Where Things Are Placed or Put Away 4   Remembering List of 4-5 Errands 4   Taking Care of Complicated Tasks 4   Applied Cognition Raw Score 24   Applied Cognition Standardized Score 25 98   Licensure   [place] License Number  Blas Alcaraz Jack Chacho 87 OTR/L [id number]

## 2022-04-01 NOTE — PLAN OF CARE
Problem: Potential for Falls  Goal: Patient will remain free of falls  Description: INTERVENTIONS:  - Educate patient/family on patient safety including physical limitations  - Instruct patient to call for assistance with activity   - Consult OT/PT to assist with strengthening/mobility   - Keep Call bell within reach  - Keep bed low and locked with side rails adjusted as appropriate  - Keep care items and personal belongings within reach  - Initiate and maintain comfort rounds  - Make Fall Risk Sign visible to staff  - Offer Toileting every 2  Hours, in advance of need  - Apply yellow socks and bracelet for high fall risk patients  - Consider moving patient to room near nurses station  Outcome: Progressing     Problem: MOBILITY - ADULT  Goal: Maintains/Returns to pre admission functional level  Description: INTERVENTIONS:  - Perform BMAT or MOVE assessment daily    - Set and communicate daily mobility goal to care team and patient/family/caregiver  - Collaborate with rehabilitation services on mobility goals if consulted  - Perform Range of Motion 3  times a day  - Reposition patient every 2  hours    - Dangle patient 3 times a day  - Stand patient 3  times a day  - Ambulate patient 1 times a day  - Out of bed to chair 3 times a day   - Out of bed for meals 3 times a day  - Out of bed for toileting  - Record patient progress and toleration of activity level   Outcome: Progressing     Problem: Prexisting or High Potential for Compromised Skin Integrity  Goal: Skin integrity is maintained or improved  Description: INTERVENTIONS:  - Identify patients at risk for skin breakdown  - Assess and monitor skin integrity  - Assess and monitor nutrition and hydration status  - Monitor labs   - Assess for incontinence   - Turn and reposition patient  - Assist with mobility/ambulation  - Relieve pressure over bony prominences  - Avoid friction and shearing  - Provide appropriate hygiene as needed including keeping skin clean and dry  - Evaluate need for skin moisturizer/barrier cream  - Collaborate with interdisciplinary team   - Patient/family teaching  - Consider wound care consult   Outcome: Progressing     Problem: PAIN - ADULT  Goal: Verbalizes/displays adequate comfort level or baseline comfort level  Description: Interventions:  - Encourage patient to monitor pain and request assistance  - Assess pain using appropriate pain scale  - Administer analgesics based on type and severity of pain and evaluate response  - Implement non-pharmacological measures as appropriate and evaluate response  - Consider cultural and social influences on pain and pain management  - Notify physician/advanced practitioner if interventions unsuccessful or patient reports new pain  Outcome: Progressing     Problem: INFECTION - ADULT  Goal: Absence or prevention of progression during hospitalization  Description: INTERVENTIONS:  - Assess and monitor for signs and symptoms of infection  - Monitor lab/diagnostic results  - Monitor all insertion sites, i e  indwelling lines, tubes, and drains  - Monitor endotracheal if appropriate and nasal secretions for changes in amount and color  - [place] appropriate cooling/warming therapies per order  - Administer medications as ordered  - Instruct and encourage patient and family to use good hand hygiene technique  - Identify and instruct in appropriate isolation precautions for identified infection/condition  Outcome: Progressing     Problem: DISCHARGE PLANNING  Goal: Discharge to home or other facility with appropriate resources  Description: INTERVENTIONS:  - Identify barriers to discharge w/patient and caregiver  - Arrange for needed discharge resources and transportation as appropriate  - Identify discharge learning needs (meds, wound care, etc )  - Arrange for interpretive services to assist at discharge as needed  - Refer to Case Management Department for coordinating discharge planning if the patient needs post-hospital services based on physician/advanced practitioner order or complex needs related to functional status, cognitive ability, or social support system  Outcome: Progressing     Problem: Knowledge Deficit  Goal: Patient/family/caregiver demonstrates understanding of disease process, treatment plan, medications, and discharge instructions  Description: Complete learning assessment and assess knowledge base    Interventions:  - Provide teaching at level of understanding  - Provide teaching via preferred learning methods  Outcome: Progressing

## 2022-04-01 NOTE — PLAN OF CARE
Problem: Potential for Falls  Goal: Patient will remain free of falls  Description: INTERVENTIONS:  - Educate patient/family on patient safety including physical limitations  - Instruct patient to call for assistance with activity   - Consult OT/PT to assist with strengthening/mobility   - Keep Call bell within reach  - Keep bed low and locked with side rails adjusted as appropriate  - Keep care items and personal belongings within reach  - Initiate and maintain comfort rounds  - Make Fall Risk Sign visible to staff  - Offer Toileting every  2 Hours, in advance of need  - Initiate/Maintain bed/chair alarm  - Obtain necessary fall risk management equipment:  walker  - Apply yellow socks and bracelet for high fall risk patients  - Consider moving patient to room near nurses station  Outcome: Progressing

## 2022-04-01 NOTE — ASSESSMENT & PLAN NOTE
This is a 53-year-old female with history of chronic diastolic CHF on 3 L supplemental oxygen, asthma, diabetes mellitus, GERD, hyperlipidemia, atrial fibrillation on Eliquis, SVT, CKD stage 3, anemia, recent left shoulder replacement on 03/01/2022 presented with generalized weakness, fever, fatigue with T-max of 102 9° at home  Patient was initially seen in the ED on 03/27/2022 for fevers and was discharged home on Keflex for possible UTI however returned with worsening symptoms  · Criteria met with leukocytosis, fever, suspect complicated UTI    · CT abdomen pelvis revealed decreased bilateral pleural effusions, no hydronephrosis or obstructive uropathy, no bowel obstruction or inflammatory process  · Chest x-ray revealed right IJ catheter, small right effusion  · UA positive asymptomatic - Stable appearing mild bilateral perinephric stranding on CT   · No evidence of erythema, tenderness or infection of left shoulder  · Elevated procalcitonin  · Infectious Disease consultation recommendations appreciated  · Patient had been on ceftriaxone, transitioned to oral Omnicef 300 mg p o  Q 12 hours for total 1 week antibiotic course through April 4th  · Blood cultures negative to date

## 2022-04-01 NOTE — PHYSICAL THERAPY NOTE
PT TREATMENT     04/01/22 0948   Note Type   Note Type Treatment   Pain Assessment   Pain Assessment Tool 0-10   Pain Score 4   Pain Location/Orientation   (L arm)   Restrictions/Precautions   LUE Weight Bearing Per Order NWB   Braces or Orthoses Sling   Other Precautions Fall Risk;O2   General   Chart Reviewed Yes   Cognition   Overall Cognitive Status WFL   Subjective   Subjective c/o feeling tired   Bed Mobility   Supine to Sit 3  Moderate assistance   Transfers   Sit to Stand 5  Supervision   Stand to Sit 5  Supervision   Stand pivot 5  Supervision   Additional items   (with hemiwalker and 2L02)   Ambulation/Elevation   Gait pattern Wide ONUR   Gait Assistance 5  Supervision   Assistive Device Adarsh-walker  (3L02)   Distance 2x20 feet   Balance   Static Sitting Fair +   Dynamic Sitting Fair +   Static Standing Fair +   Dynamic Standing Fair   Ambulatory Fair   Activity Tolerance   Activity Tolerance Patient tolerated treatment well;Patient limited by fatigue   Exercises   Neuro re-ed x20 each ankle pumps, LAQ, hip flexion sitting in the chair  pt encouarged to perform these exercises throughout the day   Assessment   Prognosis Good   Problem List Decreased strength;Decreased endurance; Impaired balance;Decreased mobility;Pain;Orthopedic restrictions; Obesity   Assessment Pt motivated and demonstrates improving endurance and function  Pt demonstrates a generally steady gait with use of the hemiwalker  Plan to continue PT to improve independence with mobility, improve strength, and improve endurance  The patient's AM-PAC Basic Mobility Inpatient Short Form Raw Score is 15  A Raw score of less than or equal to 16 suggests the patient may benefit from discharge to post-acute rehabilitation services, however pt has 24 hour care at home so recommend home PT upon DC  Plan   Treatment/Interventions ADL retraining;Functional transfer training;LE strengthening/ROM; Elevations; Therapeutic exercise; Endurance training;Gait training;Bed mobility; Equipment eval/education;Patient/family training   Progress Progressing toward goals   PT Frequency Other (Comment)  (5w)   Recommendation   PT Discharge Recommendation Home with home health rehabilitation   Equipment Recommended   (pt has all DME needed)   AM-PAC Basic Mobility Inpatient   Turning in Bed Without Bedrails 2   Lying on Back to Sitting on Edge of Flat Bed 2   Moving Bed to Chair 3   Standing Up From Chair 3   Walk in Room 3   Climb 3-5 Stairs 2   Basic Mobility Inpatient Raw Score 15   Basic Mobility Standardized Score 36 97   Highest Level Of Mobility   JH-HLM Goal 4: Move to chair/commode   JH-HLM Highest Level of Mobility 7: Walk 25 feet or more   JH-HLM Goal Achieved Yes   End of Consult   Patient Position at End of Consult Bedside chair; All needs within reach   [employer] License Number  Evrin Modi PT [id number]

## 2022-04-01 NOTE — PLAN OF CARE
Problem: OCCUPATIONAL THERAPY ADULT  Goal: Performs self-care activities at highest level of function for planned discharge setting  See evaluation for individualized goals  Description: Treatment Interventions: ADL retraining,Functional transfer training,UE strengthening/ROM,Endurance training,Patient/family training,Equipment evaluation/education,Compensatory technique education,Continued evaluation,Energy conservation,Activityengagement          See flowsheet documentation for full assessment, interventions and recommendations  Outcome: Progressing  Note: Limitation: Decreased ADL status,Decreased UE ROM,Decreased UE strength,Decreased Safe judgement during ADL,Decreased endurance,Decreased self-care trans,Decreased high-level ADLs  Prognosis: Good  Assessment: Patient seen for OT treatment  Patient requires max assist for BM hygiene  Patient is supervision for transfers and functional mobility and has a niece who has been assisting with ADLS at home  Patient will benefit from continued OT serives to maximize functional performance with ADLS         OT Discharge Recommendation: Home with home health rehabilitation

## 2022-04-01 NOTE — ASSESSMENT & PLAN NOTE
Lab Results   Component Value Date    EGFR 28 04/01/2022    EGFR 26 03/31/2022    EGFR 21 03/30/2022    CREATININE 1 81 (H) 04/01/2022    CREATININE 1 91 (H) 03/31/2022    CREATININE 2 25 (H) 03/30/2022     Nephrology consulted  As discussed with Nephrology may have to accept slightly higher creatinine as patient's new baseline in order to maintain euvolemia  · CKD- 1 86 2 days ago and 2 05 on admission  · baseline creatinine appears to be around 1 4-1  6s  · Now with elevated creatinine likely secondary to sepsis  · Diuretic was held initially  Received Gentle IV fluids  · Urinary retention protocol  · Nephrology consultation appreciated  · Resumed Demadex yesterday  · Creatinine today 1 81   · Nephrology recommend checking BMP 1 week, outpatient follow-up

## 2022-04-01 NOTE — ASSESSMENT & PLAN NOTE
Wt Readings from Last 3 Encounters:   03/31/22 121 kg (267 lb 3 2 oz)   03/27/22 117 kg (258 lb)   03/25/22 117 kg (259 lb)     · Appears euvolemic  · Echocardiogram 50% ejection fraction 03/10/2022  · ProBNP 3733, CT scan with improving pulmonary edema and bilateral effusions  · Nephrology consulted, patient had received gentle IV hydration; as per discussion with Nephrology may have to except slightly higher creatinine for patient to maintain euvolemia  Restarted patient's Demadex 10 mg p o  Daily yesterday  ·  Daily weight and I & Os  · Check chest x-ray to rule out CHF

## 2022-04-02 VITALS
TEMPERATURE: 97.5 F | BODY MASS INDEX: 49.13 KG/M2 | SYSTOLIC BLOOD PRESSURE: 128 MMHG | HEIGHT: 62 IN | HEART RATE: 76 BPM | RESPIRATION RATE: 18 BRPM | DIASTOLIC BLOOD PRESSURE: 58 MMHG | WEIGHT: 266.98 LBS | OXYGEN SATURATION: 100 %

## 2022-04-02 LAB
ANION GAP SERPL CALCULATED.3IONS-SCNC: 4 MMOL/L (ref 4–13)
BACTERIA BLD CULT: NORMAL
BACTERIA BLD CULT: NORMAL
BASOPHILS # BLD AUTO: 0.08 THOUSANDS/ΜL (ref 0–0.1)
BASOPHILS NFR BLD AUTO: 1 % (ref 0–1)
BUN SERPL-MCNC: 41 MG/DL (ref 5–25)
CALCIUM SERPL-MCNC: 8.2 MG/DL (ref 8.3–10.1)
CHLORIDE SERPL-SCNC: 103 MMOL/L (ref 100–108)
CO2 SERPL-SCNC: 31 MMOL/L (ref 21–32)
CREAT SERPL-MCNC: 1.5 MG/DL (ref 0.6–1.3)
EOSINOPHIL # BLD AUTO: 0.55 THOUSAND/ΜL (ref 0–0.61)
EOSINOPHIL NFR BLD AUTO: 5 % (ref 0–6)
ERYTHROCYTE [DISTWIDTH] IN BLOOD BY AUTOMATED COUNT: 15.5 % (ref 11.6–15.1)
GFR SERPL CREATININE-BSD FRML MDRD: 35 ML/MIN/1.73SQ M
GLUCOSE SERPL-MCNC: 154 MG/DL (ref 65–140)
GLUCOSE SERPL-MCNC: 155 MG/DL (ref 65–140)
GLUCOSE SERPL-MCNC: 191 MG/DL (ref 65–140)
HCT VFR BLD AUTO: 27.1 % (ref 34.8–46.1)
HGB BLD-MCNC: 7.9 G/DL (ref 11.5–15.4)
IMM GRANULOCYTES # BLD AUTO: 0.24 THOUSAND/UL (ref 0–0.2)
IMM GRANULOCYTES NFR BLD AUTO: 2 % (ref 0–2)
LYMPHOCYTES # BLD AUTO: 2.07 THOUSANDS/ΜL (ref 0.6–4.47)
LYMPHOCYTES NFR BLD AUTO: 19 % (ref 14–44)
MAGNESIUM SERPL-MCNC: 2.4 MG/DL (ref 1.6–2.6)
MCH RBC QN AUTO: 28.2 PG (ref 26.8–34.3)
MCHC RBC AUTO-ENTMCNC: 29.2 G/DL (ref 31.4–37.4)
MCV RBC AUTO: 97 FL (ref 82–98)
MONOCYTES # BLD AUTO: 1.32 THOUSAND/ΜL (ref 0.17–1.22)
MONOCYTES NFR BLD AUTO: 12 % (ref 4–12)
NEUTROPHILS # BLD AUTO: 6.49 THOUSANDS/ΜL (ref 1.85–7.62)
NEUTS SEG NFR BLD AUTO: 61 % (ref 43–75)
NRBC BLD AUTO-RTO: 0 /100 WBCS
PLATELET # BLD AUTO: 253 THOUSANDS/UL (ref 149–390)
PMV BLD AUTO: 11.4 FL (ref 8.9–12.7)
POTASSIUM SERPL-SCNC: 5.7 MMOL/L (ref 3.5–5.3)
PROCALCITONIN SERPL-MCNC: 0.65 NG/ML
RBC # BLD AUTO: 2.8 MILLION/UL (ref 3.81–5.12)
SODIUM SERPL-SCNC: 138 MMOL/L (ref 136–145)
WBC # BLD AUTO: 10.75 THOUSAND/UL (ref 4.31–10.16)

## 2022-04-02 PROCEDURE — 84145 PROCALCITONIN (PCT): CPT | Performed by: NURSE PRACTITIONER

## 2022-04-02 PROCEDURE — 83735 ASSAY OF MAGNESIUM: CPT | Performed by: NURSE PRACTITIONER

## 2022-04-02 PROCEDURE — 99239 HOSP IP/OBS DSCHRG MGMT >30: CPT | Performed by: NURSE PRACTITIONER

## 2022-04-02 PROCEDURE — 99232 SBSQ HOSP IP/OBS MODERATE 35: CPT | Performed by: INTERNAL MEDICINE

## 2022-04-02 PROCEDURE — 82948 REAGENT STRIP/BLOOD GLUCOSE: CPT

## 2022-04-02 PROCEDURE — 85025 COMPLETE CBC W/AUTO DIFF WBC: CPT | Performed by: NURSE PRACTITIONER

## 2022-04-02 PROCEDURE — 80048 BASIC METABOLIC PNL TOTAL CA: CPT | Performed by: INTERNAL MEDICINE

## 2022-04-02 RX ORDER — CHOLECALCIFEROL (VITAMIN D3) 10 MCG
1 TABLET ORAL DAILY
Qty: 30 CAPSULE | Refills: 0 | Status: SHIPPED | OUTPATIENT
Start: 2022-04-02 | End: 2022-05-03 | Stop reason: SDUPTHER

## 2022-04-02 RX ORDER — INSULIN GLARGINE 300 U/ML
25 INJECTION, SOLUTION SUBCUTANEOUS
Qty: 4.5 ML | Refills: 0
Start: 2022-04-02 | End: 2022-04-22 | Stop reason: SDUPTHER

## 2022-04-02 RX ORDER — CEFDINIR 300 MG/1
300 CAPSULE ORAL EVERY 12 HOURS SCHEDULED
Qty: 5 CAPSULE | Refills: 0 | Status: SHIPPED | OUTPATIENT
Start: 2022-04-02 | End: 2022-04-05

## 2022-04-02 RX ADMIN — CEFDINIR 300 MG: 300 CAPSULE ORAL at 08:46

## 2022-04-02 RX ADMIN — TORSEMIDE 10 MG: 10 TABLET ORAL at 08:46

## 2022-04-02 RX ADMIN — INSULIN LISPRO 3 UNITS: 100 INJECTION, SOLUTION INTRAVENOUS; SUBCUTANEOUS at 12:11

## 2022-04-02 RX ADMIN — APIXABAN 5 MG: 5 TABLET, FILM COATED ORAL at 08:46

## 2022-04-02 RX ADMIN — METOPROLOL TARTRATE 50 MG: 50 TABLET, FILM COATED ORAL at 08:46

## 2022-04-02 RX ADMIN — DOCUSATE SODIUM 100 MG: 100 CAPSULE, LIQUID FILLED ORAL at 08:46

## 2022-04-02 RX ADMIN — Medication 250 MG: at 08:46

## 2022-04-02 RX ADMIN — INSULIN LISPRO 2 UNITS: 100 INJECTION, SOLUTION INTRAVENOUS; SUBCUTANEOUS at 12:11

## 2022-04-02 RX ADMIN — INSULIN LISPRO 2 UNITS: 100 INJECTION, SOLUTION INTRAVENOUS; SUBCUTANEOUS at 08:43

## 2022-04-02 RX ADMIN — INSULIN LISPRO 3 UNITS: 100 INJECTION, SOLUTION INTRAVENOUS; SUBCUTANEOUS at 08:44

## 2022-04-02 NOTE — ASSESSMENT & PLAN NOTE
baseline hemoglobin 8 5-8 9 g/dl   Currently 7 9,? Dilutional secondary to patient receiving IV hydration  No active signs of bleeding  Repeat CBC in 3 days   Will prescribe nephrocaps daily

## 2022-04-02 NOTE — DISCHARGE INSTR - AVS FIRST PAGE
Take cefdinir 300 mg PO q 12 hours to complete 1 week antibiotic course  through 4/4/22  Take OTC probiotic when on antibiotic  Monitor weight at home  Notify provider if weight gain of 2 lb in one day or 5 lb in three days  Monitor blood sugar at home before each meal and at bedtime for now  Goal pre meal BS < 140,random < 180  Monitor BP at home  Goal BP < 140/90  May resume Norvasc if SBP > 140 consistently  Monitor HR at home  Goal HR 60-80s  Check CBC with diff,BMP in one week  Follow up Nephrology post discharge  Office will arrange for follow up  Imaging revealed multiple pulmonary nodules  Short-term follow-up with CT chest in 3 months  Please discuss with PCP  Follow up PCP in one week  Continue incentive spirometer at home

## 2022-04-02 NOTE — ASSESSMENT & PLAN NOTE
This is a 44-year-old female with history of chronic diastolic CHF on 3 L supplemental oxygen, asthma, diabetes mellitus, GERD, hyperlipidemia, atrial fibrillation on Eliquis, SVT, CKD stage 3, anemia, recent left shoulder replacement on 03/01/2022 presented with generalized weakness, fever, fatigue with T-max of 102 9° at home  Patient was initially seen in the ED on 03/27/2022 for fevers and was discharged home on Keflex for possible UTI however returned with worsening symptoms  · Criteria met with leukocytosis, fever, suspect complicated UTI  · CT abdomen pelvis revealed decreased bilateral pleural effusions, no hydronephrosis or obstructive uropathy, no bowel obstruction or inflammatory process  · Chest x-ray revealed right IJ catheter, small right effusion  · UA positive asymptomatic - Stable appearing mild bilateral perinephric stranding on CT   · No evidence of erythema, tenderness or infection of left shoulder  · Elevated procalcitonin,down trending  · Infectious Disease consultation recommendations appreciated  · Patient had been on ceftriaxone, transitioned to oral Omnicef 300 mg p o  Q 12 hours for total 1 week antibiotic course through April 4th  · Blood cultures negative     Patient afebrile for at least past 48 hours  procal trending down,WBC improving  DC patient home today with Omnicef through 4/4  F/U PCP in one week  Repeat CBC with diff in one week

## 2022-04-02 NOTE — PROGRESS NOTES
[address] [facility] NOTE   Beatriz Fu 73 y o  female MRN: [id number]  Unit/Bed#: [address] [facility] Encounter: [id number]  Reason for Consult: CKD    ASSESSMENT and PLAN:  1  Elevated creatinine:  · Previous baseline creatinine was around 1 1 - 1 4   · Urinalysis is nondiagnostic  Renal ultrasound 3/15/22 - no hydronephrosis  · Stable renal function  - SCr 1 50 today  · Will need to tolerate a higher creatinine to keep out of CHF  · Continue Torsemide 10 mg daily       2  Chronic kidney disease, stage III:  · Previous baseline creatinine is 1 1 to 1 4    · Need to tolerate a higher creatinine to keep euvolemic  · New baseline probably high 1s to low 2s  3  Hypertension:  · BP is controlled  · Amlodipine stopped 4/1/22  · Current Rx: Metoprolol 50 mg BID, Torsemide 10 mg OD  · No changes  4  Diastolic CHF  · Close to euvolemic  · Continue Torsemide 10 mg daily  5  Hyperkalemia:  · K 5 7 but hemolyzed specimen  · No intervention needed  6  Sepsis: Due to suspected UTI - on abx per ID    7  Atrial fibrillation with RVR  8  S/p L shoulder replacement  9  Obesity:     DISPOSITION:  · Stable renal function  · Stable for discharge from renal standpoint  · Continue torsemide 10 mg daily on discharge  · Check BMP in 1 week  · Will arrange for renal follow-up upon discharge  SUBJECTIVE / 24H INTERVAL HISTORY:  No new complaints  Feels a little cold  Appetite okay  UO 1000 cc       OBJECTIVE:  Current Weight: Weight - Scale: 121 kg (266 lb 15 6 oz)  Vitals:    04/01/22 2002 04/01/22 2217 04/02/22 0600 04/02/22 0703   BP: (!) 112/40 143/66  128/58   BP Location:       Pulse: 80 79  76   Resp: 17 18  18   Temp: 98 4 °F (36 9 °C) 98 °F (36 7 °C)  97 5 °F (36 4 °C)   TempSrc: Oral      SpO2: 99% 99%  100%   Weight:   121 kg (266 lb 15 6 oz)    Height:           Intake/Output Summary (Last 24 hours) at 4/2/2022 0718  Last data filed at 4/2/2022 0601  Gross per 24 hour   Intake 500 ml   Output 1000 ml   Net -500 ml     General: conscious, cooperative, no distress, obese  Skin: dry  Eyes: pink conjunctivae  ENT: moist mucous membranes  Respiratory: equal chest expansion, clear breath sounds  Cardiovascular: distinct heart sounds, normal rate, regular rhythm, no rub  Abdomen: soft, non tender, non distended, normal bowel sounds  Extremities: mild LE edema  Genitourinary: no onela catheter  Neuro: awake, alert     Psych: appropriate affect    Medications:    Current Facility-Administered Medications:     acetaminophen (TYLENOL) tablet 650 mg, 650 mg, Oral, Q6H PRN, Dillon Martinez MD, 650 mg at 03/30/22 1437    albuterol (PROVENTIL HFA,VENTOLIN HFA) inhaler 2 puff, 2 puff, Inhalation, Q6H PRN, Dillon Martinez MD, 2 puff at 04/01/22 0850    aluminum-magnesium hydroxide-simethicone (MYLANTA) oral suspension 30 mL, 30 mL, Oral, Q6H PRN, Dillon Martinez MD    apixaban [facility]) tablet 5 mg, 5 mg, Oral, BID, Dillon Martinez MD, 5 mg at 04/01/22 1835    atorvastatin (LIPITOR) tablet 80 mg, 80 mg, Oral, Daily With Lucia Ashton MD, 80 mg at 04/01/22 1834    bisacodyl (DULCOLAX) rectal suppository 10 mg, 10 mg, Rectal, Daily PRN, Dillon Martinez MD    cefdinir (OMNICEF) capsule 300 mg, 300 mg, Oral, Q12H [address], RK Clements, 300 mg at 04/01/22 2130    docusate sodium (COLACE) capsule 100 mg, 100 mg, Oral, Daily, Dillon Martinez MD, 100 mg at 04/01/22 0849    insulin glargine (LANTUS) subcutaneous injection 27 Units 0 27 mL, 27 Units, Subcutaneous, HS, RK Clements, 27 Units at 04/01/22 2130    insulin lispro (HumaLOG) 100 units/mL subcutaneous injection 1-6 Units, 1-6 Units, Subcutaneous, HS, Dillon Martinez MD, 1 Units at 04/01/22 2130    insulin lispro (HumaLOG) 100 units/mL subcutaneous injection 2-12 Units, 2-12 Units, Subcutaneous, TID AC, 2 Units at 04/01/22 1234 **AND** Fingerstick Glucose (POCT), , , TID AC, Dillon Martinez MD    insulin lispro (HumaLOG) 100 units/mL subcutaneous injection 3 Units, 3 Units, Subcutaneous, TID With Meals, Osiris RK Palencia, 3 Units at 04/01/22 1835    metoprolol tartrate (LOPRESSOR) tablet 50 mg, 50 mg, Oral, Q12H [facility], Osiris RK Palencia, 50 mg at 04/01/22 2130    montelukast (SINGULAIR) tablet 10 mg, 10 mg, Oral, HS, Leandra Lazcano MD, 10 mg at 04/01/22 2130    ondansetron [facility]) injection 4 mg, 4 mg, Intravenous, Q6H PRN, Leandra Lazcano MD, 4 mg at 03/31/22 0532    saccharomyces boulardii (FLORASTOR) capsule 250 mg, 250 mg, Oral, BID, JennyRK Blood, 250 mg at 04/01/22 1835    torsemide [facility]) tablet 10 mg, 10 mg, Oral, Daily, Alice Hugo MD, 10 mg at 04/01/22 0849    Laboratory Results:  Results from last 7 days   Lab Units 04/02/22  0551 04/01/22  0627 03/31/22  0548 03/30/22  0429 03/29/22  0440 03/28/22  1443 03/27/22  2318   WBC Thousand/uL 10 75* 10 44* 11 46* 15 64* 18 14* 23 55* 13 48*   HEMOGLOBIN g/dL 7 9* 7 9* 7 6* 8 0* 7 9* 10 4* 8 9*   HEMATOCRIT % 27 1* 27 2* 25 0* 27 3* 26 2* 35 3 29 9*   PLATELETS Thousands/uL 253 239 204 185 163 189 155   POTASSIUM mmol/L 5 7* 4 5 4 0 4 0 4 3 4 6 4 1   CHLORIDE mmol/L 103 101 100 98* 99* 96* 96*   CO2 mmol/L 31 32 31 33* 34* 34* 35*   BUN mg/dL 41* 47* 46* 44* 35* 31* 31*   CREATININE mg/dL 1 50* 1 81* 1 91* 2 25* 2 06* 2 05* 1 86*   CALCIUM mg/dL 8 2* 8 3 7 8* 7 6* 7 6* 8 5 7 8*   MAGNESIUM mg/dL 2 4 2 5  --  2 2 1 8  --  1 9   PHOSPHORUS mg/dL  --   --   --   --  2 8  --   --

## 2022-04-02 NOTE — PLAN OF CARE
Problem: Potential for Falls  Goal: Patient will remain free of falls  Description: INTERVENTIONS:  - Educate patient/family on patient safety including physical limitations  - Instruct patient to call for assistance with activity   - Consult OT/PT to assist with strengthening/mobility   - Keep Call bell within reach  - Keep bed low and locked with side rails adjusted as appropriate  - Keep care items and personal belongings within reach  - Initiate and maintain comfort rounds  - Make Fall Risk Sign visible to staff  - Offer Toileting every 2  Hours, in advance of need  - Apply yellow socks and bracelet for high fall risk patients  - Consider moving patient to room near nurses station  Outcome: Progressing     Problem: MOBILITY - ADULT  Goal: Maintains/Returns to pre admission functional level  Description: INTERVENTIONS:  - Perform BMAT or MOVE assessment daily    - Set and communicate daily mobility goal to care team and patient/family/caregiver  - Collaborate with rehabilitation services on mobility goals if consulted  - Perform Range of Motion 3  times a day  - Reposition patient every 2  hours  - Dangle patient 3  times a day  - Stand patient 3  times a day  - Ambulate patient as tolerated    - Out of bed to chair 3  times a day   - Out of bed for meals 3  times a day  - Out of bed for toileting  - Record patient progress and toleration of activity level   Outcome: Progressing     Problem: Prexisting or High Potential for Compromised Skin Integrity  Goal: Skin integrity is maintained or improved  Description: INTERVENTIONS:  - Identify patients at risk for skin breakdown  - Assess and monitor skin integrity  - Assess and monitor nutrition and hydration status  - Monitor labs   - Assess for incontinence   - Turn and reposition patient  - Assist with mobility/ambulation  - Relieve pressure over bony prominences  - Avoid friction and shearing  - Provide appropriate hygiene as needed including keeping skin clean and dry  - Evaluate need for skin moisturizer/barrier cream  - Collaborate with interdisciplinary team   - Patient/family teaching  - Consider wound care consult   Outcome: Progressing     Problem: PAIN - ADULT  Goal: Verbalizes/displays adequate comfort level or baseline comfort level  Description: Interventions:  - Encourage patient to monitor pain and request assistance  - Assess pain using appropriate pain scale  - Administer analgesics based on type and severity of pain and evaluate response  - Implement non-pharmacological measures as appropriate and evaluate response  - Consider cultural and social influences on pain and pain management  - Notify physician/advanced practitioner if interventions unsuccessful or patient reports new pain  Outcome: Progressing     Problem: INFECTION - ADULT  Goal: Absence or prevention of progression during hospitalization  Description: INTERVENTIONS:  - Assess and monitor for signs and symptoms of infection  - Monitor lab/diagnostic results  - Monitor all insertion sites, i e  indwelling lines, tubes, and drains  - Monitor endotracheal if appropriate and nasal secretions for changes in amount and color  - [place] appropriate cooling/warming therapies per order  - Administer medications as ordered  - Instruct and encourage patient and family to use good hand hygiene technique  - Identify and instruct in appropriate isolation precautions for identified infection/condition  Outcome: Progressing     Problem: DISCHARGE PLANNING  Goal: Discharge to home or other facility with appropriate resources  Description: INTERVENTIONS:  - Identify barriers to discharge w/patient and caregiver  - Arrange for needed discharge resources and transportation as appropriate  - Identify discharge learning needs (meds, wound care, etc )  - Arrange for interpretive services to assist at discharge as needed  - Refer to Case Management Department for coordinating discharge planning if the patient needs post-hospital services based on physician/advanced practitioner order or complex needs related to functional status, cognitive ability, or social support system  Outcome: Progressing     Problem: Knowledge Deficit  Goal: Patient/family/caregiver demonstrates understanding of disease process, treatment plan, medications, and discharge instructions  Description: Complete learning assessment and assess knowledge base    Interventions:  - Provide teaching at level of understanding  - Provide teaching via preferred learning methods  Outcome: Progressing

## 2022-04-02 NOTE — PLAN OF CARE
Problem: Potential for Falls  Goal: Patient will remain free of falls  Description: INTERVENTIONS:  - Educate patient/family on patient safety including physical limitations  - Instruct patient to call for assistance with activity   - Consult OT/PT to assist with strengthening/mobility   - Keep Call bell within reach  - Keep bed low and locked with side rails adjusted as appropriate  - Keep care items and personal belongings within reach  - Initiate and maintain comfort rounds  - Make Fall Risk Sign visible to staff  - Offer Toileting every 2 Hours, in advance of need  - Initiate/Maintain bed alarm  - Obtain necessary fall risk management equipment:   - Apply yellow socks and bracelet for high fall risk patients  - Consider moving patient to room near nurses station  Outcome: Progressing     Problem: MOBILITY - ADULT  Goal: Maintain or return to baseline ADL function  Description: INTERVENTIONS:  -  Assess patient's ability to carry out ADLs; assess patient's baseline for ADL function and identify physical deficits which impact ability to perform ADLs (bathing, care of mouth/teeth, toileting, grooming, dressing, etc )  - Assess/evaluate cause of self-care deficits   - Assess range of motion  - Assess patient's mobility; develop plan if impaired  - Assess patient's need for assistive devices and provide as appropriate  - Encourage maximum independence but intervene and supervise when necessary  - Involve family in performance of ADLs  - Assess for home care needs following discharge   - Consider OT consult to assist with ADL evaluation and planning for discharge  - Provide patient education as appropriate  Outcome: Progressing  Goal: Maintains/Returns to pre admission functional level  Description: INTERVENTIONS:  - Perform BMAT or MOVE assessment daily    - Set and communicate daily mobility goal to care team and patient/family/caregiver     - Collaborate with rehabilitation services on mobility goals if consulted  - Perform Range of Motion 3 times a day  - Reposition patient every 2 hours    - Dangle patient 3 times a day  - Stand patient 3 times a day  - Ambulate patient 3 times a day  - Out of bed to chair 3 times a day   - Out of bed for meals 3 times a day  - Out of bed for toileting  - Record patient progress and toleration of activity level   Outcome: Progressing     Problem: Prexisting or High Potential for Compromised Skin Integrity  Goal: Skin integrity is maintained or improved  Description: INTERVENTIONS:  - Identify patients at risk for skin breakdown  - Assess and monitor skin integrity  - Assess and monitor nutrition and hydration status  - Monitor labs   - Assess for incontinence   - Turn and reposition patient  - Assist with mobility/ambulation  - Relieve pressure over bony prominences  - Avoid friction and shearing  - Provide appropriate hygiene as needed including keeping skin clean and dry  - Evaluate need for skin moisturizer/barrier cream  - Collaborate with interdisciplinary team   - Patient/family teaching  - Consider wound care consult   Outcome: Progressing     Problem: PAIN - ADULT  Goal: Verbalizes/displays adequate comfort level or baseline comfort level  Description: Interventions:  - Encourage patient to monitor pain and request assistance  - Assess pain using appropriate pain scale  - Administer analgesics based on type and severity of pain and evaluate response  - Implement non-pharmacological measures as appropriate and evaluate response  - Consider cultural and social influences on pain and pain management  - Notify physician/advanced practitioner if interventions unsuccessful or patient reports new pain  Outcome: Progressing     Problem: INFECTION - ADULT  Goal: Absence or prevention of progression during hospitalization  Description: INTERVENTIONS:  - Assess and monitor for signs and symptoms of infection  - Monitor lab/diagnostic results  - Monitor all insertion sites, i e  indwelling lines, tubes, and drains  - Monitor endotracheal if appropriate and nasal secretions for changes in amount and color  - [place] appropriate cooling/warming therapies per order  - Administer medications as ordered  - Instruct and encourage patient and family to use good hand hygiene technique  - Identify and instruct in appropriate isolation precautions for identified infection/condition  Outcome: Progressing     Problem: SAFETY ADULT  Goal: Patient will remain free of falls  Description: INTERVENTIONS:  - Educate patient/family on patient safety including physical limitations  - Instruct patient to call for assistance with activity   - Consult OT/PT to assist with strengthening/mobility   - Keep Call bell within reach  - Keep bed low and locked with side rails adjusted as appropriate  - Keep care items and personal belongings within reach  - Initiate and maintain comfort rounds  - Make Fall Risk Sign visible to staff  - Offer Toileting every 2 Hours, in advance of need  - Initiate/Maintain bed alarm  - Obtain necessary fall risk management equipment:   - Apply yellow socks and bracelet for high fall risk patients  - Consider moving patient to room near nurses station  Outcome: Progressing  Goal: Maintain or return to baseline ADL function  Description: INTERVENTIONS:  -  Assess patient's ability to carry out ADLs; assess patient's baseline for ADL function and identify physical deficits which impact ability to perform ADLs (bathing, care of mouth/teeth, toileting, grooming, dressing, etc )  - Assess/evaluate cause of self-care deficits   - Assess range of motion  - Assess patient's mobility; develop plan if impaired  - Assess patient's need for assistive devices and provide as appropriate  - Encourage maximum independence but intervene and supervise when necessary  - Involve family in performance of ADLs  - Assess for home care needs following discharge   - Consider OT consult to assist with ADL evaluation and planning for discharge  - Provide patient education as appropriate  Outcome: Progressing  Goal: Maintains/Returns to pre admission functional level  Description: INTERVENTIONS:  - Perform BMAT or MOVE assessment daily    - Set and communicate daily mobility goal to care team and patient/family/caregiver  - Collaborate with rehabilitation services on mobility goals if consulted  - Perform Range of Motion 3 times a day  - Reposition patient every 2 hours  - Dangle patient 3 times a day  - Stand patient 3 times a day  - Ambulate patient 3 times a day  - Out of bed to chair 3 times a day   - Out of bed for meals 3 times a day  - Out of bed for toileting  - Record patient progress and toleration of activity level   Outcome: Progressing     Problem: DISCHARGE PLANNING  Goal: Discharge to home or other facility with appropriate resources  Description: INTERVENTIONS:  - Identify barriers to discharge w/patient and caregiver  - Arrange for needed discharge resources and transportation as appropriate  - Identify discharge learning needs (meds, wound care, etc )  - Arrange for interpretive services to assist at discharge as needed  - Refer to Case Management Department for coordinating discharge planning if the patient needs post-hospital services based on physician/advanced practitioner order or complex needs related to functional status, cognitive ability, or social support system  Outcome: Progressing     Problem: Knowledge Deficit  Goal: Patient/family/caregiver demonstrates understanding of disease process, treatment plan, medications, and discharge instructions  Description: Complete learning assessment and assess knowledge base    Interventions:  - Provide teaching at level of understanding  - Provide teaching via preferred learning methods  Outcome: Progressing     Problem: Nutrition/Hydration-ADULT  Goal: Nutrient/Hydration intake appropriate for improving, restoring or maintaining nutritional needs  Description: Monitor and assess patient's nutrition/hydration status for malnutrition  Collaborate with interdisciplinary team and initiate plan and interventions as ordered  Monitor patient's weight and dietary intake as ordered or per policy  Utilize nutrition screening tool and intervene as necessary  Determine patient's food preferences and provide high-protein, high-caloric foods as appropriate       INTERVENTIONS:  - Monitor oral intake, urinary output, labs, and treatment plans  - Assess nutrition and hydration status and recommend course of action  - Evaluate amount of meals eaten  - Assist patient with eating if necessary   - Allow adequate time for meals  - Recommend/ encourage appropriate diets, oral nutritional supplements, and vitamin/mineral supplements  - Order, calculate, and assess calorie counts as needed  - Recommend, monitor, and adjust tube feedings and TPN/PPN based on assessed needs  - Assess need for intravenous fluids  - Provide specific nutrition/hydration education as appropriate  - Include patient/family/caregiver in decisions related to nutrition  Outcome: Progressing

## 2022-04-02 NOTE — DISCHARGE INSTRUCTIONS
Cefdinir (By mouth)   Cefdinir (SEF-di-maribell)  Treats bacterial infections  This medicine is a cephalosporin antibiotic  Brand Name(s):   There may be other brand names for this medicine  When This Medicine Should Not Be Used: This medicine is not right for everyone  Do not use it if you had an allergic reaction to cefdinir or to any type of cephalosporin  How to Use This Medicine:   Capsule, Liquid  · Your doctor will tell you how much medicine to use  Do not use more than directed  · Shake the oral liquid well before use  · Measure the oral liquid medicine with a marked measuring spoon, oral syringe, or medicine cup  · Take all of the medicine in your prescription to clear up your infection, even if you feel better after the first few doses  · This medicine is not for long-term use  · Missed dose: Take a dose as soon as you remember  If it is almost time for your next dose, wait until then and take a regular dose  Do not take extra medicine to make up for a missed dose  · Store the medicine in a closed container at room temperature, away from heat, moisture, and direct light  Discard any unused portion of the oral liquid after 10 days  Drugs and Foods to Avoid:   Ask your doctor or pharmacist before using any other medicine, including over-the-counter medicines, vitamins, and herbal products  · Some medicines can affect how cefdinir works  Tell your doctor if you are also taking probenecid  · If you use antacids or iron supplements (including those found in multivitamins), take them at least 2 hours before or 2 hours after you take cefdinir  Warnings While Using This Medicine:   · Tell your doctor if you are pregnant or breastfeeding, have kidney disease, or had an allergic reaction to penicillin antibiotics or any other medicines  · This medicine can cause diarrhea  Call your doctor if the diarrhea becomes severe, does not stop, or is bloody   Do not take any medicine to stop diarrhea until you have talked to your doctor  Diarrhea can occur 2 months or more after you stop taking this medicine  · If you have diabetes, use Clinistix® or Elvia-Tape® for urine glucose tests while you are taking cefdinir  Cefdinir capsules and liquid may cause incorrect results on the Clinitest® urine glucose test   · Tell any doctor or dentist who treats you that you are using this medicine  This medicine may affect certain medical test results  · Call your doctor if your symptoms do not improve or if they get worse  · Keep all medicine out of the reach of children  Never share your medicine with anyone  Possible Side Effects While Using This Medicine:   Call your doctor right away if you notice any of these side effects:  · Allergic reaction: Itching or hives, swelling in your face or hands, swelling or tingling in your mouth or throat, chest tightness, trouble breathing  · Blistering, peeling, red skin rash  · Red or black stools  · Severe diarrhea or stomach pain  · Sores or white patches on your lips, mouth, or throat  If you notice these less serious side effects, talk with your doctor:   · Bloated feeling, constipation, loss of appetite, nausea, vomiting, or upset stomach  · Dizziness, drowsiness, sleepiness, or unusual weakness  · Dry mouth  · Trouble sleeping  If you notice other side effects that you think are caused by this medicine, tell your doctor  Call your doctor for medical advice about side effects  You may report side effects to FDA at 7-208-FDA-8175    © Copyright [employer] 2022 Information is for End User's use only and may not be sold, redistributed or otherwise used for commercial purposes  The above information is an  only  It is not intended as medical advice for individual conditions or treatments  Talk to your doctor, nurse or pharmacist before following any medical regimen to see if it is safe and effective for you            Vitamin B Combination (By mouth)   Supplies your body with B vitamins  You might need extra B vitamins because of an illness or other medicines that you are using  You might not be getting enough B vitamins from the food you eat, or for other reasons  Brand Name(s): Allbee w/C, Av-Alonso FB, B Complex with C, B-Compleet, B-Complex plus Vitamin C, M-QCNSALM-42, B6-Folic Acid, Balanced K49, Bull Homocysteine Guard, Cerefolin, Deplin 15, Deplin 7 5, Eligen B12, FaBB, Folast   There may be other brand names for this medicine  When This Medicine Should Not Be Used: You should not use this medicine if you have had an allergic reaction to any kind of vitamin B  How to Use This Medicine:   Tablet, Liquid, Long Acting Tablet, Capsule  · Your doctor will tell you how much medicine to use  Do not use more than directed  · Carefully follow your doctor's instructions about any special diet  · Follow the instructions on the medicine label if you are using this medicine without a prescription  · Swallow the extended-release tablet whole  Do not crush, break, or chew it  · The chewable tablet must be chewed completely before you swallow it  · Measure the oral liquid medicine with a marked measuring spoon, oral syringe, or medicine cup  You might need to shake the liquid just before you use it  If a dose is missed:   · Missing a dose of a vitamin supplement is usually not a cause for concern  · Take a dose as soon as you remember  If it is almost time for your next dose, wait until then and take a regular dose  Do not take extra medicine to make up for a missed dose  How to Store and Dispose of This Medicine:   · Store the medicine in a closed container at room temperature, away from heat, moisture, and direct light  · Ask your pharmacist, doctor, or health caregiver about the best way to dispose of any outdated medicine or medicine no longer needed  · Keep all medicine out of the reach of children  Never share your medicine with anyone    Drugs and Foods to Avoid:      Ask your doctor or pharmacist before using any other medicine, including over-the-counter medicines, vitamins, and herbal products  Warnings While Using This Medicine:   · Make sure your doctor knows if you are pregnant or breast feeding  Tell your doctor if you have liver disease or kidney disease  Possible Side Effects While Using This Medicine:   Call your doctor right away if you notice any of these side effects:  · Allergic reaction: Itching or hives, swelling in your face or hands, swelling or tingling in your mouth or throat, chest tightness, trouble breathing  If you notice these less serious side effects, talk with your doctor:   · Nausea, diarrhea, gas  If you notice other side effects that you think are caused by this medicine, tell your doctor  Call your doctor for medical advice about side effects  You may report side effects to FDA at 8-613-FDA-8089    © Copyright [address] 2022 Information is for End User's use only and may not be sold, redistributed or otherwise used for commercial purposes  The above information is an  only  It is not intended as medical advice for individual conditions or treatments  Talk to your doctor, nurse or pharmacist before following any medical regimen to see if it is safe and effective for you

## 2022-04-02 NOTE — ASSESSMENT & PLAN NOTE
Lab Results   Component Value Date    HGBA1C 6 8 (H) 03/09/2022       Recent Labs     04/01/22  1606 04/01/22  2035 04/02/22  0747 04/02/22  1118   POCGLU 144* 158* 154* 191*       Blood Sugar Average: Last 72 hrs:  (P) 193 [id number]     · Lantus 25 units HS  · Blood sugar slightly elevated  Increased Lantus to 27 units subQ HS last night  · Recieved sliding scale for coverage, mealtime insulin  · DC patient home on Lantus 25 units HS and resume Trulicity weekly on discharge  · Monitor blood sugar before each meal and at bedtime at home  Goal pre meal blood sugar less than 140, random less than 180  How Severe Is Your Skin Lesion?: mild Have Your Skin Lesions Been Treated?: not been treated Is This A New Presentation, Or A Follow-Up?: Skin Lesions

## 2022-04-02 NOTE — PROGRESS NOTES
IV removed  Discharge instructions reviewed and understood by patient and family member  Patient left in stable condition by wheelchair

## 2022-04-02 NOTE — DISCHARGE SUMMARY
[address]  Discharge- Rebecca Martinez 1950, 73 y o  female MRN: [id number]  Unit/Bed#: [address] [place] [address] Encounter: [id number]  Primary Care Provider: Darian Dumas MD   Date and time admitted to hospital: 3/28/2022  1:50 PM    * Sepsis [facility])  Assessment & Plan  This is a 79-year-old female with history of chronic diastolic CHF on 3 L supplemental oxygen, asthma, diabetes mellitus, GERD, hyperlipidemia, atrial fibrillation on Eliquis, SVT, CKD stage 3, anemia, recent left shoulder replacement on 03/01/2022 presented with generalized weakness, fever, fatigue with T-max of 102 9° at home  Patient was initially seen in the ED on 03/27/2022 for fevers and was discharged home on Keflex for possible UTI however returned with worsening symptoms  · Criteria met with leukocytosis, fever, suspect complicated UTI  · CT abdomen pelvis revealed decreased bilateral pleural effusions, no hydronephrosis or obstructive uropathy, no bowel obstruction or inflammatory process  · Chest x-ray revealed right IJ catheter, small right effusion  · UA positive asymptomatic - Stable appearing mild bilateral perinephric stranding on CT   · No evidence of erythema, tenderness or infection of left shoulder  · Elevated procalcitonin,down trending  · Infectious Disease consultation recommendations appreciated  · Patient had been on ceftriaxone, transitioned to oral Omnicef 300 mg p o  Q 12 hours for total 1 week antibiotic course through April 4th  · Blood cultures negative     Patient afebrile for at least past 48 hours  procal trending down,WBC improving  DC patient home today with Omnicef through 4/4  F/U PCP in one week  Repeat CBC with diff in one week  Atrial fibrillation with rapid ventricular response (HCC)  Assessment & Plan  · likely secondary to sepsis   · responded to Cardizem 15 mg IV x 1   · changed Lopressor to 25 mg p o  Q 6 hours due to soft BP    Changed back to 50 mg bid for discharge planning  · Continue Eliquis  · Telemetry sinus rhythm to uncontrolled AFib  Discontinued  · Optimize electrolytes    Chronic diastolic (congestive) heart failure (HCC)  Assessment & Plan  Wt Readings from Last 3 Encounters:   04/02/22 121 kg (266 lb 15 6 oz)   03/27/22 117 kg (258 lb)   03/25/22 117 kg (259 lb)     · Appears euvolemic  · Echocardiogram 50% ejection fraction 03/10/2022  · ProBNP 3733, CT scan with improving pulmonary edema and bilateral effusions on admission  · Nephrology consulted, patient had received gentle IV hydration; as per discussion with Nephrology may have to except slightly higher creatinine for patient to maintain euvolemia  Restarted patient's Demadex 10 mg p o  Daily 3/31  · Weight stable hospital course  · Chest x-ray 4/1 - Small right pleural effusion is similar to prior study  · Monitor weight at home  Notify provider if weight gain of 2 lb in one day or 5 lb in three days  Stage 3 chronic kidney disease [facility])  Assessment & Plan  Lab Results   Component Value Date    EGFR 35 04/02/2022    EGFR 28 04/01/2022    EGFR 26 03/31/2022    CREATININE 1 50 (H) 04/02/2022    CREATININE 1 81 (H) 04/01/2022    CREATININE 1 91 (H) 03/31/2022     Nephrology consulted  As discussed with Nephrology may have to accept slightly higher creatinine as patient's new baseline in order to maintain euvolemia  · CKD- 1 86 2 days ago and 2 05 on admission  · baseline creatinine appears to be around 1 4-1  6s  · Now with elevated creatinine likely secondary to sepsis  · Diuretic was held initially  Received Gentle IV fluids  · Urinary retention protocol  · Nephrology consultation appreciated  · Resumed Demadex 3/31  · Creatinine today 1 50  · Nephrology recommend checking BMP 1 week, outpatient follow-up      Pulmonary nodules  Assessment & Plan  · Imaging revealed multiple pulmonary nodules  · Short-term follow-up with CT chest in 3 months    Status post reverse total replacement of left shoulder  Assessment & Plan  · Underwent surgery on 3/1/22   · Xray 3/25 appears stable  · PT eval recommending home with home healthcare    Gastroesophageal reflux disease  Assessment & Plan  · PPI and mylanta     Diabetes mellitus, type 2 [facility])  Assessment & Plan  Lab Results   Component Value Date    HGBA1C 6 8 (H) 03/09/2022       Recent Labs     04/01/22  1606 04/01/22  2035 04/02/22  0747 04/02/22  1118   POCGLU 144* 158* 154* 191*       Blood Sugar Average: Last 72 hrs:  (P) 193 [id number]     · Lantus 25 units HS  · Blood sugar slightly elevated  Increased Lantus to 27 units subQ HS last night  · Recieved sliding scale for coverage, mealtime insulin  · DC patient home on Lantus 25 units HS and resume Trulicity weekly on discharge  · Monitor blood sugar before each meal and at bedtime at home  Goal pre meal blood sugar less than 140, random less than 180  Anemia  Assessment & Plan  baseline hemoglobin 8 5-8 9 g/dl   Currently 7 9,? Dilutional secondary to patient receiving IV hydration  No active signs of bleeding  Repeat CBC in 3 days   Will prescribe nephrocaps daily        Mixed hyperlipidemia  Assessment & Plan  · on lipitor inpatient   · crestor outpatient    Morbid obesity with BMI of 45 0-49 9, adult ([facility] [address] )  Assessment & Plan  · Would benefit from diet and lifestyle modification      Discharging Physician / Practitioner: RK Parks  PCP: Basil Stanton MD  Admission Date: 3/28/2022  Discharge Date: 04/02/22    Reason for Admission: Fever - 9 weeks to 74 years (was here this morning and d/c, still c/o fever and states she is now in afib)        Medical Problems             Resolved Problems  Date Reviewed: 4/2/2022    None                Consultations During Hospital Stay:  IP CONSULT TO INFECTIOUS DISEASES  IP CONSULT TO NEPHROLOGY    Procedures Performed:     · None    Significant Findings / Test Results:     · As below  Results from last 7 days   Lab Units 04/02/22  0555 04/01/22  0627 03/31/22  0548   WBC Thousand/uL 10 75* 10 44* 11 46*   HEMOGLOBIN g/dL 7 9* 7 9* 7 6*   PLATELETS Thousands/uL 253 239 204     Results from last 7 days   Lab Units 04/02/22  0551 04/01/22  0627 03/31/22  0548 03/30/22  0429 03/29/22  0440 03/28/22  1443 03/28/22  1443 03/27/22  2318 03/27/22  2318   SODIUM mmol/L 138 139 139   < > 136   < > 136   < > 136   POTASSIUM mmol/L 5 7* 4 5 4 0   < > 4 3   < > 4 6   < > 4 1   CHLORIDE mmol/L 103 101 100   < > 99*   < > 96*   < > 96*   CO2 mmol/L 31 32 31   < > 34*   < > 34*   < > 35*   BUN mg/dL 41* 47* 46*   < > 35*   < > 31*   < > 31*   CREATININE mg/dL 1 50* 1 81* 1 91*   < > 2 06*   < > 2 05*   < > 1 86*   CALCIUM mg/dL 8 2* 8 3 7 8*   < > 7 6*   < > 8 5   < > 7 8*   TOTAL BILIRUBIN mg/dL  --   --   --   --  0 36  --  0 55  --  0 38   ALK PHOS U/L  --   --   --   --  72  --  91  --  90   ALT U/L  --   --   --   --  15  --  18  --  20   AST U/L  --   --   --   --  12  --  15  --  12    < > = values in this interval not displayed  Results from last 7 days   Lab Units 03/29/22  0440 03/28/22  1443 03/27/22  2359   INR  2 01* 2 01* 1 67*         Lab Results   Component Value Date/Time    HGBA1C 6 8 (H) 03/09/2022 06:30 PM     Results from last 7 days   Lab Units 04/02/22  1118 04/02/22  0747 04/01/22 2035 04/01/22  1606 04/01/22  1201 04/01/22  0737 03/31/22  2135 03/31/22  1622 03/31/22  1118 03/31/22  0723 03/30/22 2050 03/30/22  1626   POC GLUCOSE mg/dl 191* 154* 158* 144* 176* 200* 183* 204* 177* 177* 211* 242*     Results from last 7 days   Lab Units 04/02/22  0551 04/01/22  0627 03/30/22  0429 03/28/22  1744 03/28/22  1443 03/27/22  2359   LACTIC ACID mmol/L  --   --   --  1 3  --  1 4   PROCALCITONIN ng/ml 0 65* 1 17* 3 57*  --  1 95*  --      Blood Culture:   Lab Results   Component Value Date    BLOODCX No Growth After 5 Days  03/28/2022    BLOODCX No Growth After 5 Days  03/27/2022    BLOODCX No Growth After 5 Days   03/09/2022    BLOODCX No Growth After 5 Days  03/09/2022    BLOODCX No Growth After 5 Days  05/25/2019    BLOODCX No Growth After 5 Days  05/25/2019     Urine Culture:   Lab Results   Component Value Date    URINECX 50,000-59,000 cfu/ml  03/28/2022    URINECX <10,000 cfu/ml  03/10/2022    URINECX No Growth <1000 cfu/mL 01/01/2020     Sputum Culture: No components found for: SPUTUMCX  Wound Culture: No results found for: WOUNDCULT     XR chest portable   Final Result by Velma Brown MD (04/01 1606)      Small right pleural effusion is similar to prior study  Workstation performed: [id number]         VAS upper limb venous duplex scan, unilateral/limited   Final Result by Mila Pedroza MD (03/29 1439)      CT chest abdomen pelvis wo contrast   Final Result by Kenny Medley MD (03/29 0299)      Decreased bilateral pleural effusions and pulmonary edema as described above with a residual small right pleural effusion remaining  Passive atelectasis in the lower lobes  Multiple pulmonary nodules as described above  Short-term follow-up chest CT study is advised in 3 months  No hydronephrosis or obstructive uropathy  No bowel obstruction or focal inflammatory process  Stable additional findings as above  Workstation performed: [id number]         XR chest 1 view portable   Final Result by Benitez Upton MD (03/29 0827)      Right IJ catheter tip in SVC  No pneumothorax  Small right effusion  Workstation performed: [id number]         XR chest 1 view portable   Final Result by Rea Dubon MD (03/29 1020)      No radiographic evidence of acute intrathoracic process or significant interval change  Workstation performed: [id number]                Incidental Findings:   ·  Pulmonary nodules  Notified patient to follow-up with PCP on discharge  Test Results Pending at Discharge (will require follow up):    · None     Outpatient Tests Requested:  · CBC with diff, BMP in 1 week    Complications:  None    Reason for Admission:   Chief Complaint   Patient presents with    Fever - 9 weeks to 74 years     was here this morning and d/c, still c/o fever and states she is now in [facility] Course:     Per HPI: Maxine Daigle is a 73 y o  female patient with a PMH of anemia, AFib, CHF, type 2 diabetes, GERD, hyperlipidemia, CKD 3, recent left shoulder replacement who originally presented to the hospital on 3/28/2022 due to sepsis likely due to complicated UTI, AFib with RVR, elevated creatinine  Patient received IV Rocephin, then switched to cefdinir through April 4th to complete 1 week course per ID  Patient was seen by renal for elevated creatinine  Received gentle IV hydration and holding Demadex  Creatinine returned to baseline today  Discharge patient home today with VNA, repeat BMP, CBC with diff in 1 week  Outpatient follow-up with Nephrology post discharge  Follow-up PCP in 1 week  Spoke to patient at bedside, all questions answered  Recommend to monitor blood sugar, blood pressure, heart rate, weight at home  Patient verbalized understanding  Hospital Course:    Please see above list of diagnoses and related plan for additional information  Condition at Discharge: good       Discharge Day Visit / Exam:     Subjective:  Patient denies SOB, cough, chest pain, headache, dizziness, nausea vomiting, diarrhea, constipation, fever, chills  Denies UTI  Reports mild symptoms  Ready to go home  Patient reports mild pain left shoulder  Vitals: Blood Pressure: 128/58 (04/02/22 0703)  Pulse: 76 (04/02/22 0703)  Temperature: 97 5 °F (36 4 °C) (04/02/22 0703)  Temp Source: Oral (04/01/22 2002)  Respirations: 18 (04/02/22 0703)  Height: 5' 2" (157 5 cm) (03/28/22 1350)  Weight - Scale: 121 kg (266 lb 15 6 oz) (04/02/22 0600)  SpO2: 100 % (04/02/22 0703)  Exam:   Physical Exam  Vitals and nursing note reviewed     Constitutional: Appearance: She is well-developed  She is obese  HENT:      Head: Normocephalic and atraumatic  Neck:      Thyroid: No thyromegaly  Vascular: No JVD  Trachea: No tracheal deviation  Cardiovascular:      Rate and Rhythm: Normal rate and regular rhythm  Heart sounds: Normal heart sounds  Pulmonary:      Effort: Pulmonary effort is normal  No respiratory distress  Breath sounds: Normal breath sounds  No wheezing or rales  Comments: On oxygen 3 L, satting well  Abdominal:      General: Bowel sounds are normal  There is no distension  Palpations: Abdomen is soft  Tenderness: There is no abdominal tenderness  There is no guarding  Musculoskeletal:         General: Deformity present  No swelling  Cervical back: Neck supple  Right lower leg: No edema  Left lower leg: No edema  Comments: Left shoulder on sling  Skin:     General: Skin is warm and dry  Neurological:      General: No focal deficit present  Mental Status: She is alert and oriented to person, place, and time  Psychiatric:         Mood and Affect: Mood normal          Judgment: Judgment normal            Discharge instructions/Information to patient and family:   See after visit summary for information provided to patient and family  Provisions for Follow-Up Care:  See after visit summary for information related to follow-up care and any pertinent home health orders  Disposition:     Home with VNA Services (Reminder: Complete face to face encounter)    Planned Readmission: no     Discharge Statement:  I spent 40 minutes discharging the patient  This time was spent on the day of discharge  I had direct contact with the patient on the day of discharge  Greater than 50% of the total time was spent examining patient, answering all patient questions, arranging and discussing plan of care with patient as well as directly providing post-discharge instructions    Additional time then spent on discharge activities  Discharge Medications:  See after visit summary for reconciled discharge medications provided to patient and family        ** Please Note: This note has been constructed using a voice recognition system **

## 2022-04-02 NOTE — ASSESSMENT & PLAN NOTE
Lab Results   Component Value Date    EGFR 35 04/02/2022    EGFR 28 04/01/2022    EGFR 26 03/31/2022    CREATININE 1 50 (H) 04/02/2022    CREATININE 1 81 (H) 04/01/2022    CREATININE 1 91 (H) 03/31/2022     Nephrology consulted  As discussed with Nephrology may have to accept slightly higher creatinine as patient's new baseline in order to maintain euvolemia  · CKD- 1 86 2 days ago and 2 05 on admission  · baseline creatinine appears to be around 1 4-1  6s  · Now with elevated creatinine likely secondary to sepsis  · Diuretic was held initially  Received Gentle IV fluids  · Urinary retention protocol  · Nephrology consultation appreciated  · Resumed Demadex 3/31  · Creatinine today 1 50  · Nephrology recommend checking BMP 1 week, outpatient follow-up

## 2022-04-02 NOTE — ASSESSMENT & PLAN NOTE
Wt Readings from Last 3 Encounters:   04/02/22 121 kg (266 lb 15 6 oz)   03/27/22 117 kg (258 lb)   03/25/22 117 kg (259 lb)     · Appears euvolemic  · Echocardiogram 50% ejection fraction 03/10/2022  · ProBNP 3733, CT scan with improving pulmonary edema and bilateral effusions on admission  · Nephrology consulted, patient had received gentle IV hydration; as per discussion with Nephrology may have to except slightly higher creatinine for patient to maintain euvolemia  Restarted patient's Demadex 10 mg p o  Daily 3/31  · Weight stable hospital course  · Chest x-ray 4/1 - Small right pleural effusion is similar to prior study  · Monitor weight at home  Notify provider if weight gain of 2 lb in one day or 5 lb in three days

## 2022-04-04 ENCOUNTER — TELEPHONE (OUTPATIENT)
Dept: NEPHROLOGY | Facility: CLINIC | Age: 72
End: 2022-04-04

## 2022-04-04 ENCOUNTER — PATIENT OUTREACH (OUTPATIENT)
Dept: INTERNAL MEDICINE CLINIC | Facility: CLINIC | Age: 72
End: 2022-04-04

## 2022-04-04 ENCOUNTER — TRANSITIONAL CARE MANAGEMENT (OUTPATIENT)
Dept: INTERNAL MEDICINE CLINIC | Facility: CLINIC | Age: 72
End: 2022-04-04

## 2022-04-04 LAB
ATRIAL RATE: 114 BPM
P AXIS: 22 DEGREES
PR INTERVAL: 158 MS
QRS AXIS: -30 DEGREES
QRSD INTERVAL: 74 MS
QT INTERVAL: 290 MS
QTC INTERVAL: 399 MS
T WAVE AXIS: 61 DEGREES
VENTRICULAR RATE: 114 BPM

## 2022-04-04 PROCEDURE — 93010 ELECTROCARDIOGRAM REPORT: CPT | Performed by: INTERNAL MEDICINE

## 2022-04-04 NOTE — PROGRESS NOTES
[facility]igh notification received for new Medicare  Bundle patient  Chart review completed  Outside records through [facility] requested and refreshed  Call placed to patient cell  LM on VM with this CM contact number and request for return call

## 2022-04-06 ENCOUNTER — PATIENT OUTREACH (OUTPATIENT)
Dept: INTERNAL MEDICINE CLINIC | Facility: CLINIC | Age: 72
End: 2022-04-06

## 2022-04-06 ENCOUNTER — LAB (OUTPATIENT)
Dept: LAB | Facility: CLINIC | Age: 72
End: 2022-04-06
Payer: MEDICARE

## 2022-04-06 DIAGNOSIS — E11.22 TYPE 2 DIABETES MELLITUS WITH STAGE 3 CHRONIC KIDNEY DISEASE, WITH LONG-TERM CURRENT USE OF INSULIN, UNSPECIFIED WHETHER STAGE 3A OR 3B CKD (HCC): ICD-10-CM

## 2022-04-06 DIAGNOSIS — I50.31 ACUTE DIASTOLIC CONGESTIVE HEART FAILURE (HCC): ICD-10-CM

## 2022-04-06 DIAGNOSIS — A41.9 SEPSIS (HCC): ICD-10-CM

## 2022-04-06 DIAGNOSIS — N18.32 STAGE 3B CHRONIC KIDNEY DISEASE (HCC): ICD-10-CM

## 2022-04-06 DIAGNOSIS — I10 ESSENTIAL HYPERTENSION: ICD-10-CM

## 2022-04-06 DIAGNOSIS — N18.30 TYPE 2 DIABETES MELLITUS WITH STAGE 3 CHRONIC KIDNEY DISEASE, WITH LONG-TERM CURRENT USE OF INSULIN, UNSPECIFIED WHETHER STAGE 3A OR 3B CKD (HCC): ICD-10-CM

## 2022-04-06 DIAGNOSIS — R60.0 LOWER LEG EDEMA: ICD-10-CM

## 2022-04-06 DIAGNOSIS — D64.9 ANEMIA, UNSPECIFIED TYPE: ICD-10-CM

## 2022-04-06 DIAGNOSIS — Z79.4 TYPE 2 DIABETES MELLITUS WITH STAGE 3 CHRONIC KIDNEY DISEASE, WITH LONG-TERM CURRENT USE OF INSULIN, UNSPECIFIED WHETHER STAGE 3A OR 3B CKD (HCC): ICD-10-CM

## 2022-04-06 DIAGNOSIS — E11.00 TYPE 2 DIABETES MELLITUS WITH HYPEROSMOLARITY WITHOUT COMA, WITHOUT LONG-TERM CURRENT USE OF INSULIN (HCC): ICD-10-CM

## 2022-04-06 LAB
ALBUMIN SERPL BCP-MCNC: 2.8 G/DL (ref 3.5–5)
ALP SERPL-CCNC: 69 U/L (ref 46–116)
ALT SERPL W P-5'-P-CCNC: 15 U/L (ref 12–78)
ANION GAP SERPL CALCULATED.3IONS-SCNC: 3 MMOL/L (ref 4–13)
ANISOCYTOSIS BLD QL SMEAR: PRESENT
ARTIFACT: PRESENT
AST SERPL W P-5'-P-CCNC: 14 U/L (ref 5–45)
BASOPHILS # BLD MANUAL: 0 THOUSAND/UL (ref 0–0.1)
BASOPHILS NFR MAR MANUAL: 0 % (ref 0–1)
BILIRUB SERPL-MCNC: 0.4 MG/DL (ref 0.2–1)
BUN SERPL-MCNC: 25 MG/DL (ref 5–25)
CALCIUM ALBUM COR SERPL-MCNC: 9.9 MG/DL (ref 8.3–10.1)
CALCIUM SERPL-MCNC: 8.9 MG/DL (ref 8.3–10.1)
CHLORIDE SERPL-SCNC: 104 MMOL/L (ref 100–108)
CO2 SERPL-SCNC: 34 MMOL/L (ref 21–32)
CREAT SERPL-MCNC: 1.25 MG/DL (ref 0.6–1.3)
EOSINOPHIL # BLD MANUAL: 0.95 THOUSAND/UL (ref 0–0.4)
EOSINOPHIL NFR BLD MANUAL: 11 % (ref 0–6)
ERYTHROCYTE [DISTWIDTH] IN BLOOD BY AUTOMATED COUNT: 15.5 % (ref 11.6–15.1)
GFR SERPL CREATININE-BSD FRML MDRD: 43 ML/MIN/1.73SQ M
GLUCOSE SERPL-MCNC: 173 MG/DL (ref 65–140)
HCT VFR BLD AUTO: 31.2 % (ref 34.8–46.1)
HGB BLD-MCNC: 8.9 G/DL (ref 11.5–15.4)
LYMPHOCYTES # BLD AUTO: 3.35 THOUSAND/UL (ref 0.6–4.47)
LYMPHOCYTES # BLD AUTO: 39 % (ref 14–44)
MCH RBC QN AUTO: 27.9 PG (ref 26.8–34.3)
MCHC RBC AUTO-ENTMCNC: 28.5 G/DL (ref 31.4–37.4)
MCV RBC AUTO: 98 FL (ref 82–98)
MONOCYTES # BLD AUTO: 0.77 THOUSAND/UL (ref 0–1.22)
MONOCYTES NFR BLD: 9 % (ref 4–12)
NEUTROPHILS # BLD MANUAL: 3.18 THOUSAND/UL (ref 1.85–7.62)
NEUTS BAND NFR BLD MANUAL: 11 % (ref 0–8)
NEUTS SEG NFR BLD AUTO: 26 % (ref 43–75)
PLATELET # BLD AUTO: 330 THOUSANDS/UL (ref 149–390)
PLATELET BLD QL SMEAR: ADEQUATE
PMV BLD AUTO: 10.7 FL (ref 8.9–12.7)
POLYCHROMASIA BLD QL SMEAR: PRESENT
POTASSIUM SERPL-SCNC: 4.8 MMOL/L (ref 3.5–5.3)
PROT SERPL-MCNC: 7.2 G/DL (ref 6.4–8.2)
RBC # BLD AUTO: 3.19 MILLION/UL (ref 3.81–5.12)
RBC MORPH BLD: PRESENT
SODIUM SERPL-SCNC: 141 MMOL/L (ref 136–145)
TOXIC GRANULES BLD QL SMEAR: PRESENT
VARIANT LYMPHS # BLD AUTO: 4 %
WBC # BLD AUTO: 8.6 THOUSAND/UL (ref 4.31–10.16)

## 2022-04-06 PROCEDURE — 80053 COMPREHEN METABOLIC PANEL: CPT

## 2022-04-06 PROCEDURE — 36415 COLL VENOUS BLD VENIPUNCTURE: CPT

## 2022-04-06 PROCEDURE — 85027 COMPLETE CBC AUTOMATED: CPT

## 2022-04-06 PROCEDURE — 85007 BL SMEAR W/DIFF WBC COUNT: CPT

## 2022-04-06 NOTE — PROGRESS NOTES
[facility] notification received for new Medicare  Bundle patient  Chart review completed  Outside records through [facility] requested and refreshed  Patient hospitalized   [facility]-3/28/22-4/2/22  Past Medical History  See problem list for complete list of medical diagnosis  Future appointments:  4/8/22-[facility]    CM contacted patient to introduce self, explain the role of the OP CM and purpose of this phone call is to assess patient's general wellbeing or for any assistance needed with follow-up care, medication costs, or financial difficulty  Explanation given regarding the free Medicare BPCI-A program that allows for added telephonic nursing support for 90 days to help coordinate and manage further care, as needed  In addition, CM explained there would be a one time telephonic assessment to collect a medical and social history intake  CM added that outreach calls would be weekly/ biweekly/ monthly to ensure patient/caregiver is progressing  CM would communicate with other healthcare providers on the patient care team to ensure their care is coordinated  Patient consented to  future outreach of CM  Margret Ames reports she is doing well  She tells this CM she is on her way to get blood work done  Margret Ames agrees to outreach in 1 week from this CM  CM provided contact information and encouraged patient to contact CM as needed

## 2022-04-07 ENCOUNTER — TELEPHONE (OUTPATIENT)
Dept: NEPHROLOGY | Facility: CLINIC | Age: 72
End: 2022-04-07

## 2022-04-07 NOTE — TELEPHONE ENCOUNTER
Pt advised that kidney function (labs of 4/6/22) is stable per  Dr Natalie Carey     ----- Message from Eric Nagel MD sent at 4/7/2022 10:55 AM EDT -----  Please inform patient that kidney function is stable based on latest lab tests (4/6/22)

## 2022-04-08 ENCOUNTER — TELEPHONE (OUTPATIENT)
Dept: OBGYN CLINIC | Facility: CLINIC | Age: 72
End: 2022-04-08

## 2022-04-08 ENCOUNTER — OFFICE VISIT (OUTPATIENT)
Dept: INTERNAL MEDICINE CLINIC | Facility: CLINIC | Age: 72
End: 2022-04-08
Payer: MEDICARE

## 2022-04-08 VITALS
WEIGHT: 269 LBS | DIASTOLIC BLOOD PRESSURE: 78 MMHG | TEMPERATURE: 97.5 F | OXYGEN SATURATION: 97 % | BODY MASS INDEX: 49.5 KG/M2 | HEIGHT: 62 IN | SYSTOLIC BLOOD PRESSURE: 122 MMHG | HEART RATE: 80 BPM

## 2022-04-08 DIAGNOSIS — E11.00 TYPE 2 DIABETES MELLITUS WITH HYPEROSMOLARITY WITHOUT COMA, WITHOUT LONG-TERM CURRENT USE OF INSULIN (HCC): ICD-10-CM

## 2022-04-08 DIAGNOSIS — R91.1 LUNG NODULE < 6CM ON CT: Primary | ICD-10-CM

## 2022-04-08 DIAGNOSIS — E78.2 MIXED HYPERLIPIDEMIA: ICD-10-CM

## 2022-04-08 DIAGNOSIS — I50.32 CHRONIC DIASTOLIC (CONGESTIVE) HEART FAILURE (HCC): ICD-10-CM

## 2022-04-08 DIAGNOSIS — N18.32 STAGE 3B CHRONIC KIDNEY DISEASE (HCC): ICD-10-CM

## 2022-04-08 DIAGNOSIS — I87.2 PERIPHERAL VENOUS INSUFFICIENCY: ICD-10-CM

## 2022-04-08 DIAGNOSIS — I50.31 ACUTE DIASTOLIC CONGESTIVE HEART FAILURE (HCC): ICD-10-CM

## 2022-04-08 DIAGNOSIS — J96.01 ACUTE RESPIRATORY FAILURE WITH HYPOXIA (HCC): ICD-10-CM

## 2022-04-08 DIAGNOSIS — R91.8 PULMONARY NODULES: ICD-10-CM

## 2022-04-08 DIAGNOSIS — L89.156 PRESSURE INJURY OF DEEP TISSUE OF SACRAL REGION: ICD-10-CM

## 2022-04-08 DIAGNOSIS — D64.9 ANEMIA, UNSPECIFIED TYPE: ICD-10-CM

## 2022-04-08 DIAGNOSIS — I10 ESSENTIAL HYPERTENSION: ICD-10-CM

## 2022-04-08 DIAGNOSIS — I48.91 ATRIAL FIBRILLATION WITH RAPID VENTRICULAR RESPONSE (HCC): ICD-10-CM

## 2022-04-08 DIAGNOSIS — A41.9 SEPSIS, DUE TO UNSPECIFIED ORGANISM, UNSPECIFIED WHETHER ACUTE ORGAN DYSFUNCTION PRESENT (HCC): ICD-10-CM

## 2022-04-08 PROBLEM — N63.20 LUMP OF LEFT BREAST: Status: ACTIVE | Noted: 2022-04-08

## 2022-04-08 PROBLEM — L89.152 PRESSURE INJURY OF SACRAL REGION, STAGE 2 (HCC): Status: ACTIVE | Noted: 2022-04-08

## 2022-04-08 PROBLEM — IMO0001 LUNG NODULE < 6CM ON CT: Status: ACTIVE | Noted: 2022-03-25

## 2022-04-08 PROCEDURE — 99496 TRANSJ CARE MGMT HIGH F2F 7D: CPT | Performed by: INTERNAL MEDICINE

## 2022-04-08 RX ORDER — LANCETS
EACH MISCELLANEOUS
Qty: 100 EACH | Refills: 2 | Status: SHIPPED | OUTPATIENT
Start: 2022-04-08

## 2022-04-08 RX ORDER — BLOOD SUGAR DIAGNOSTIC
STRIP MISCELLANEOUS
Qty: 100 STRIP | Refills: 2 | Status: SHIPPED | OUTPATIENT
Start: 2022-04-08 | End: 2022-07-06 | Stop reason: SDUPTHER

## 2022-04-08 NOTE — ASSESSMENT & PLAN NOTE
Lab Results   Component Value Date    EGFR 43 04/06/2022    EGFR 35 04/02/2022    EGFR 28 04/01/2022    CREATININE 1 25 04/06/2022    CREATININE 1 50 (H) 04/02/2022    CREATININE 1 81 (H) 04/01/2022   Renal function improved  Creatinine 1 24  Discharge creatinine was 1 5    Electrolytes normal   Patient has appointment with nephrologist

## 2022-04-08 NOTE — ASSESSMENT & PLAN NOTE
Oxygenation at rest was adequate here  See continuously monitor  Remains on oxygen the 3 litre per minute    Patient to be seen by cardiologist and nephrologist in near future

## 2022-04-08 NOTE — ASSESSMENT & PLAN NOTE
Wt Readings from Last 3 Encounters:   04/08/22 122 kg (269 lb)   04/02/22 121 kg (266 lb 15 6 oz)   03/27/22 117 kg (258 lb)     Chest x-ray is not impressive minimal right pleural effusion generally seems to be doing fair    Will continue to monitor  Her dry weight is 262 lb  Range will with   They will continue to monitor    Patient has appointment with cardiologist coming up

## 2022-04-08 NOTE — ASSESSMENT & PLAN NOTE
03/29/2022:  CT scan of chest abdomen and pelvis done during hospitalizations revealed  CHEST WALL AND LOWER NECK:   Tiny nodular density in the left breast is stable when compared with the prior study of 2020   No thyroid gland enlargement

## 2022-04-08 NOTE — PATIENT INSTRUCTIONS
Hold your metoprolol if your systolic blood pressure less than 100 or heart rate less than 60    Continue to weigh yourself every day  Your today and last couple of days weight average will be your dry weight which should be  262 lb  Your range will be 259-265 lb    Continue to check your blood pressure blood sugar weight  Keep your appointment with your cardiologist and nephrologist next month  Continue skin check every day  If your 1 is not completely healed you let me know  Follow with Consultants as per their and our suggestion    Follow up in 2  week(s) or as needed earlier    Follow all instructions as advised and discussed  Take your medications as prescribed  Call the office immediately if you experience any side effects  Ask questions if you do not understand  Keep your scheduled appointment as advised or come sooner if necessary or in doubt  Best time to call for non-urgent matter or questions on weekdays is between 9am and 12 noon  See physician for any new symptoms or worsening of current symptoms  Urgent or emergent situations call 911 and report to nearest emergency room  I spent  45 minutes taking care of this patient including clinical care, conseling, collaboration, chart, lab and consultaion review  Please watch your skin condition closely  If there is any change or any worsening I need to know a need to see you will need to refer you to the wound care center etc   But as per you things are getting much better and family will keep an eye on that  You will need follow-up CT scan of the chest in 3 months for your lung nodule follow-up    I already ordered that for you

## 2022-04-08 NOTE — ASSESSMENT & PLAN NOTE
Lab Results   Component Value Date    CHOLESTEROL 104 03/10/2022     Lab Results   Component Value Date    HDL 30 (L) 03/10/2022     Lab Results   Component Value Date    TRIG 132 03/10/2022     Continue rosuvastatin 10 mg daily

## 2022-04-08 NOTE — ASSESSMENT & PLAN NOTE
03/29/2022:  CT scan of the chest abdomen and pelvis:  LUNGS:  Passive atelectasis in the right lower lobe and minimal patchy dependent atelectasis in the left lower lobe  8 x 6 mm nodule in the right lower lobe laterally on series 3, image 46 is not significantly changed since 3/17/2022  Continued   short-term follow-up is advised in 3 months  Stable 4 mm pulmonary nodule in the right upper lobe apex on series 3, image 14  Stable 4 mm nodule in the right middle lobe on series 3, image 38  Patchy nodular groundglass densities have significantly   decreased when compared with the prior study  There is a 5 mm nodule in the right lower lobe on series 3, image 53 previously obscured by patchy airspace consolidation  5 mm pulmonary nodule in the superior segment of the left lower lobe on series 3,   image 39 appears stable  No obvious endobronchial lesion      Decreased bilateral pleural effusions and pulmonary edema as described above with a residual small right pleural effusion remaining  Passive atelectasis in the lower lobes      Multiple pulmonary nodules as described above  Short-term follow-up chest CT study is advised in 3 months      No hydronephrosis or obstructive uropathy      No bowel obstruction or focal inflammatory process      Stable additional findings as above

## 2022-04-08 NOTE — ASSESSMENT & PLAN NOTE
Diabetes well controlled  Remains on Toujeo 25 units every day    Recommend to continue to check sugar 3 times a day  Lab Results   Component Value Date    HGBA1C 6 8 (H) 03/09/2022

## 2022-04-08 NOTE — ASSESSMENT & PLAN NOTE
Pulse regular  Remains on anticoagulation  Discussed with them and educated about metoprolol metoprolol should be held only if systolic blood pressure less than 100 or heart rate less than 60   They will also continue to follow with cardiologist

## 2022-04-08 NOTE — ASSESSMENT & PLAN NOTE
Hospital hemoglobin as was low as low as 7 6  Discharge hemoglobin was 7 9  Hemoglobin now 8 9    Will continue to monitor

## 2022-04-08 NOTE — ASSESSMENT & PLAN NOTE
Heart rate is better  Pulses regular  Remains on anticoagulation    Patient to follow with cardiologist

## 2022-04-08 NOTE — PROGRESS NOTES
[facility] Office Visit Note  22     Johnnie Calhoun 73 y o  female MRN: [id number]  : 1950    Assessment:     1  Lung nodule < 6cm on CT  Assessment & Plan:  2022:  CT scan of the chest abdomen and pelvis:  LUNGS:  Passive atelectasis in the right lower lobe and minimal patchy dependent atelectasis in the left lower lobe  8 x 6 mm nodule in the right lower lobe laterally on series 3, image 46 is not significantly changed since 3/17/2022  Continued   short-term follow-up is advised in 3 months  Stable 4 mm pulmonary nodule in the right upper lobe apex on series 3, image 14  Stable 4 mm nodule in the right middle lobe on series 3, image 38  Patchy nodular groundglass densities have significantly   decreased when compared with the prior study  There is a 5 mm nodule in the right lower lobe on series 3, image 53 previously obscured by patchy airspace consolidation  5 mm pulmonary nodule in the superior segment of the left lower lobe on series 3,   image 39 appears stable  No obvious endobronchial lesion      Decreased bilateral pleural effusions and pulmonary edema as described above with a residual small right pleural effusion remaining  Passive atelectasis in the lower lobes      Multiple pulmonary nodules as described above  Short-term follow-up chest CT study is advised in 3 months      No hydronephrosis or obstructive uropathy      No bowel obstruction or focal inflammatory process      Stable additional findings as above  Orders:  -     CT chest wo contrast; Future; Expected date: 2022    2  Type 2 diabetes mellitus with hyperosmolarity without coma, without long-term current use of insulin ([facility] )  Assessment & Plan:  Diabetes well controlled  Remains on Toujeo 25 units every day  Recommend to continue to check sugar 3 times a day  Lab Results   Component Value Date    HGBA1C 6 8 (H) 2022       Orders:  -     glucose blood (OneTouch Ultra) test strip; Test twice daily    - Lancets (onetouch ultrasoft) lancets; Use once daily    3  Sepsis, due to unspecified organism, unspecified whether acute organ dysfunction present ([facility] )    4  Acute diastolic congestive heart failure (HCC)  Assessment & Plan:  Wt Readings from Last 3 Encounters:   04/08/22 122 kg (269 lb)   04/02/22 121 kg (266 lb 15 6 oz)   03/27/22 117 kg (258 lb)     Chest x-ray is not impressive minimal right pleural effusion generally seems to be doing fair    Will continue to monitor  Her dry weight is 262 lb  Range will with   They will continue to monitor  Patient has appointment with cardiologist coming up      5  Acute respiratory failure with hypoxia (HCC)  Assessment & Plan:  Oxygenation at rest was adequate here  See continuously monitor  Remains on oxygen the 3 litre per minute  Patient to be seen by cardiologist and nephrologist in near future      6  Essential hypertension  Assessment & Plan:  Blood pressure is well controlled  Continue torsemide as well as metoprolol  Patient will continue to monitor blood pressure at home and bring the diary    Hold metoprolol if blood pressure less than 100 or heart rate less than 60      7  Peripheral venous insufficiency    8  Atrial fibrillation with rapid ventricular response (HCC)  Assessment & Plan:  Heart rate is better  Pulses regular  Remains on anticoagulation  Patient to follow with cardiologist      9  Chronic diastolic (congestive) heart failure [facility])  Assessment & Plan:  Wt Readings from Last 3 Encounters:   04/08/22 122 kg (269 lb)   04/02/22 121 kg (266 lb 15 6 oz)   03/27/22 117 kg (258 lb)       Chronic diastolic CHF 2 by and large compensated  Will continue torsemide, metoprolol, may benefit from additional medication for CHF patient is awaiting cardiologist follow-up      10   Stage 3b chronic kidney disease [facility])  Assessment & Plan:  Lab Results   Component Value Date    EGFR 43 04/06/2022    EGFR 35 04/02/2022    EGFR 28 04/01/2022 CREATININE 1 25 04/06/2022    CREATININE 1 50 (H) 04/02/2022    CREATININE 1 81 (H) 04/01/2022   Renal function improved  Creatinine 1 24  Discharge creatinine was 1 5  Electrolytes normal   Patient has appointment with nephrologist       11  Mixed hyperlipidemia  Assessment & Plan:  Lab Results   Component Value Date    CHOLESTEROL 104 03/10/2022     Lab Results   Component Value Date    HDL 30 (L) 03/10/2022     Lab Results   Component Value Date    TRIG 132 03/10/2022     Continue rosuvastatin 10 mg daily      12  Anemia, unspecified type  Assessment & Plan:  Hospital hemoglobin as was low as low as 7 6  Discharge hemoglobin was 7 9  Hemoglobin now 8 9  Will continue to monitor      13  Pressure injury of deep tissue of sacral region  Assessment & Plan:  Per patient and family member it is much better  Patient does not want me to examine  They will keep an eye  If it is any worse they will out let me know  Flank area as well as the shoulder area is improved per family      15  Pulmonary nodules  Assessment & Plan:  Follow-up CT scan for 3 months ordered          Discussion Summary and Plan: Today's care plan and medications were reviewed with patient in detail and all their questions answered to their satisfaction  Wound care nurses picture from 03/31/2022 reviewed  DTI of sacral reviewed   Pt was in the bed for 5 days per patient and then she asked to get out   Chief Complaint   Patient presents with    Transition of Care Management     Recent hospital admission for sepsis, UTI, atrial fibrillation with rapid ventricular rate,    Follow-up     Skin issue noted, anemia, CKD level 3, hypertension, diabetes, hyperlipidemia, chronic respiratory failure, CHF, morbid obesity,      Subjective:  TCM Call (since 3/8/2022)     Date and time call was made  4/4/2022 10:30 AM    Hospital care reviewed  Records reviewed        Patient was hospitialized at  [address] [place] [address]        Date of Admission  03/28/22 Date of discharge  04/04/22    Diagnosis  Sepsis, Afib, CKD, CHR, UTI    Disposition  Home; Home health services  She said the nurse was coming   out today    Were the patients medications reviewed and updated  Yes  Reviewed   throughly her meds  she understood  Current Symptoms  None      TCM Call (since 3/8/2022)     Post hospital issues  Reduced activity; Poor ADL (Activities of Daily   Living) performance  She is living with a neice who is assisting with ADL   and IADL  Should patient be enrolled in anticoag monitoring? No    Scheduled for follow up? Yes    Patients specialists  Nephrologist; Cardiologist    Cardiologist name  Dr Silvio Neal    Cardiologist contact #  [phone]    Nephrologist name  Dr Krista Austin    Nephrologist contact #  [phone]    Did you obtain your prescribed medications  Yes    Do you need help managing your prescriptions or medications  No    Is transportation to your appointment needed  Yes    Specify why  Pt does not drive, She relies on her neice to transport her  Her neice is her caregiver  I have advised the patient to call PCP with any new or worsening symptoms    Werner Abernathy, Practice Coordinator    Living Arrangements  Family members    Support System  Family    The type of support provided  Emotional; Physical    Do you have social support  Yes, as much as I need    Are you recieving any outpatient services  No    Are you recieving home care services  Yes    Types of home care services  Home PT; Nurse visit; Home health aid    Are you using any community resources  No    Current waiver services  No    Have you fallen in the last 12 months  Yes    How many times  1, she had to have surgery on her shoulder  Interperter language line needed  No    Counseling  Patient; Family  Jd Adornoefrain her neice was in the background also   answering questions    Counseling topics  Activities of daily living; Importance of RX   compliance; Home health agency benefits;  Risk factor reduction      Patient was hospitalized from 2022  Discharge summaries were reviewed  61-year-old female with multiple comorbidities including chronic diastolic CHF on 3 L oxygen with history of chronic hypoxic respiratory failure, asthma, diabetes, GERD, hyperlipidemia, atrial fibrillation on Eliquis, history of SVT, CKD level 3, anemia, recent right shoulder replacement on 2022 presented with generalized weakness fever fatigue temperature 102 9 at home  Patient was initially seen on 2022 for the fever and discharged home on the Keflex  Due to worsening we had advised her to go back to the emergency room  In emergency room patient was suspected to have UTI CT scan of abdomen and pelvis revealed decreased bilateral pleural effusion no hydronephrosis  Chest x-ray revealed right IJ catheter and small right effusion  Urinalysis was positive  Patient had a stable appearing mild bilateral perinephric stranding stranding on CT scan  No evidence of erythema on the left shoulder  Patient was suspected to have sepsis with elevated procalcitonin trending down worse  Id consultation was obtained  Patient was started on the ceftriaxone which was transitioned to oral Omnicef 300 mg q 12 hour for 1 week course through   But blood cultures were negative  Other problems:  Atrial fibrillation:  The rate was rapid due to sepsis Cardizem 15 mg IV given the patient was transitioned to Lopressor 25 mg Q 6 hour subsequently switch over to 50 mg twice a day at the time of discharge Eliquis were continued the optimize electrolytes  CHF discharge weight on :  121 k lb  Echocardiogram had revealed 50% ejection fraction on 03/10/2022  ProBNP was 3733  CT scan showed improving pulmonary edema and bilateral effusion on admission  Nephrology consultation was obtained  Patient received gentle IV hydration initially  Subsequently restarted back on Demadex 10 mg daily on   Weight remains stable chest x-ray on 04/01 reveals small right effusion  Patient to monitor weight at home    CKD level 3 discharge creatinine 1 5 admission creatinine 1 91 discharge GFR 35 baseline CKD level 3 baseline creatinine 1 4-1 6 diuretic initially held initial creatinine 12 05 the patient improved    Pulmonary nodule multiple pulmonary nodules identified on the CT scan of the chest short-term CT scan of the chest is recommended in 3 months        Diabetes hemoglobin A1c 6 8 on 03/09/2022  Currently on Lantus 25 units at bedtime which was increased to 27 at the time of discharge  Patient also on Trulicity weekly  Patient to monitor blood sugar at home    GERD on PPI and Mylanta fair  Anemia is baseline hemoglobin in the range of 8 5-8 9 discharged on 7 9 probably due to IV hydration no active bleeding repeat CBC was done in 3 days improving hemoglobin    Hyperlipidemia on Lipitor Crestor  BMI in the range of 45-49  Left:  04/06/2022:  Hemoglobin 8 9  Discharge hemoglobin was 7 9  Discharge creatinine was 1 5  Admission hospital creatinine was 2 05  Now it is 1 24  Skin care instructions noted as outlined underneath  Cleanse wound to right flank with wound cleanser & pat dry  Apply Triad paste to wound & cover with small  Allevyn bordered foam  Change every other day & as needed for soilage/dislodgement  2 Cleanse wound to right sacrum with wound cleanser & pat dry  Apply Triad paste to wound & cover with Allevyn  sacral bordered foam  Change every other day & prn soilage/dislodgement  3 Cleanse skin folds daily to R&L breasts and R&L abdomen with foaming cleanser & dry well  Apply Interdry  sheet (cut in half) single layer with 2" overhang to all four areas  Do not mix with antifungal powder, regular  powder or lotions due to Interdry dressing impregnated with silver  Do not discard Interdry-rinse with soap &  water and hang to dry   Change every 3 days & prn soilage/dislodgement (used 2 packages on all 4 areas)  4  Apply hydraguard to bony prominences not covered with foam dressings 2x/day and as needed for prevention  and protection  5  Apply skin nourishing cream to the entire skin daily for moisture  6  Turn and reposition patient every 2 hours with foam wedges/pillows  7  Elevate heels off of bed with pillows to offload pressure  8  Apply pressure redistribution waffle cushion to chair when OOB      Discharge Lab for 02/2022:  Potassium 5 7 BUN 41 creatinine 1 5 hemoglobin 7 9 WBC 10 75 urine culture grew [id number] colon is  Chest x-ray 04/01/2022 small right effusion  CT scan of the chest abdomen and pelvis:  329:  Decreased bilateral pleural effusion and pulmonary edema, passive atelectasis, multiple pulmonary nodule as described, follow-up CT scan in 3 months recommended, no hydronephrosis, no bowel obstruction, additional finding    Generally Virgil Justo is doing very well  Denies any chest pain palpitation PND orthopnea  Edema is mild  Weight at home is 262 lb week which is stable  They do a daily blood pressure check pulse check pulse ox check as well as the weight check  Diabetes fair control denies any polydipsia polyuria hypoglycemic hyperglycemic symptoms  Renal function improved  Paragraphs skin condition according to patient and family is much better  She did on 1 me to check skin condition today  His a small sacral wound is pretty much close per them  Three other lesions are pretty much close per them  They will keep an eye if they need any assistance or if any of them get worse they will let me know  They are educated risk of skin in diabetic people  Chronic respiratory failure remains on oxygen pulse ox is reasonable  Paragraphs hypertension fair control  Hyperlipidemia reasonable  Obesity remains unchanged  Overall condition is much better  Paragraphs discussed with the none daughter or family member    All medications last labs hospital discharge summary reviewed with them      The following portions of the patient's history were reviewed and updated as appropriate: allergies, current medications, past family history, past medical history, past social history, past surgical history and problem list     Review of Systems   All other systems reviewed and are negative          Historical Information   Patient Active Problem List   Diagnosis    Asthma    Morbid obesity with BMI of 45 0-49 9, adult (HCC)    Lower leg edema    Paroxysmal atrial fibrillation (HCC)    Mixed hyperlipidemia    Anemia    Essential hypertension    Humeral head fracture    PONV (postoperative nausea and vomiting)    SEBASTIAN (acute kidney injury) ([facility] )    Diabetes mellitus, type 2 (HCC)    Gastroesophageal reflux disease    Nephrolithiasis    Arthritis    S/P total knee replacement, right    On continuous oral anticoagulation - eliquis    Closed 4-part fracture of proximal humerus, left, initial encounter    Constipation    Shortness of breath    Status post reverse total replacement of left shoulder    Acute diastolic congestive heart failure (HCC)    Acute respiratory failure with hypoxia (HCC)    Stage 3 chronic kidney disease (HCC)    Peripheral venous insufficiency    Post-herpetic polyneuropathy    Raynaud's disease    Lung nodule < 6cm on CT    Atrial fibrillation with rapid ventricular response (HCC)    Sepsis (HCC)    Chronic diastolic (congestive) heart failure (HCC)    Pulmonary nodules    Lump of left breast    Pressure injury of deep tissue of sacral region     Past Medical History:   Diagnosis Date    Anemia     Asthma     COVID-19     January 2022    GERD (gastroesophageal reflux disease)     Hyperlipidemia     Kidney stones     PONV (postoperative nausea and vomiting)     SVT (supraventricular tachycardia) (HCC)      Past Surgical History:   Procedure Laterality Date    APPENDECTOMY      CYSTOSCOPY W/ LASER LITHOTRIPSY Left 2016    Procedure: CYSTOSCOPY URETEROSCOPY WITH LITHOTRIPSY HOLMIUM LASER, RETROGRADE PYELOGRAM AND INSERTION STENT URETERAL;  Surgeon: Genaro Mishra MD;  Location: [address] [place] [address];  Service:     DILATION AND CURETTAGE OF UTERUS      IR THORACENTESIS  3/18/2022    JOINT REPLACEMENT      right knee    KNEE ARTHROPLASTY Right     SC CYSTOURETHROSCOPY,URETER CATHETER Left 2016    Procedure: CYSTOSCOPY RETROGRADE PYELOGRAM WITH INSERTION STENT URETERAL, left;  Surgeon: Genaro Mishra MD;  Location: [facility];  Service: Urology    SC RECONSTR TOTAL SHOULDER IMPLANT Left 3/1/2022    Procedure: ARTHROPLASTY SHOULDER REVERSE;  Surgeon: Luis Morris MD;  Location: [facility];  Service: Orthopedics    SHOULDER ARTHROTOMY Left      Social History     Substance and Sexual Activity   Alcohol Use Not Currently    Comment: rarely     Social History     Substance and Sexual Activity   Drug Use No     Social History     Tobacco Use   Smoking Status Former Smoker    Packs/day: 1 00    Years: 20 00    Pack years: 20 00    Types: Cigarettes    Quit date: 1996    Years since quittin 7   Smokeless Tobacco Never Used     Family History   Problem Relation Age of Onset    Heart disease Mother     Emphysema Maternal Grandmother     Heart disease Family      Health Maintenance Due   Topic    Hepatitis C Screening     Medicare Annual Wellness Visit (AWV)     Pneumococcal Vaccine: 65+ Years (1 of 4 - PCV13)    Diabetic Foot Exam     DM Eye Exam     BMI: Followup Plan     DTaP,Tdap,and Td Vaccines (1 - Tdap)    Breast Cancer Screening: Mammogram     Osteoporosis Screening     Fall Risk     URINE MICROALBUMIN     Influenza Vaccine (1)    COVID-19 Vaccine (2 - Moderna 3-dose series)      Meds/Allergies       Current Outpatient Medications:     acetaminophen (TYLENOL) 325 mg tablet, Take 650 mg by mouth every 6 (six) hours as needed for mild pain  , Disp: , Rfl:     albuterol (PROVENTIL HFA,VENTOLIN HFA) 90 mcg/act inhaler, Inhale 2 puffs every 6 (six) hours as needed for wheezing, Disp: 8 g, Rfl: 1    ammonium lactate (LAC-HYDRIN) 12 % lotion, APPLY TOPICALLY TO AFFECTED AREAS twice a day, Disp: , Rfl:     apixaban (ELIQUIS) 5 mg, Take 1 tablet (5 mg total) by mouth 2 (two) times a day, Disp: 60 tablet, Rfl: 0    b complex-vitamin C-folic acid (NEPHROCAPS) 1 mg capsule, Take 1 capsule by mouth daily, Disp: 30 capsule, Rfl: 0    docusate sodium (COLACE) 100 mg capsule, Take 100 mg by mouth in the morning, Disp: , Rfl:     montelukast (SINGULAIR) 10 mg tablet, Take 10 mg by mouth daily  , Disp: , Rfl:     NOVOFINE AUTOCOVER 30G X 8 MM MISC, , Disp: , Rfl: 0    nystatin (MYCOSTATIN) powder, Apply topically 2 (two) times a day, Disp: , Rfl:     pregabalin (LYRICA) 100 mg capsule, Take 100 mg by mouth 2 (two) times a day , Disp: , Rfl:     rosuvastatin (CRESTOR) 10 MG tablet, 10 mg daily  , Disp: , Rfl: 0    torsemide (DEMADEX) 10 mg tablet, Take 1 tablet (10 mg total) by mouth daily, Disp: 30 tablet, Rfl: 0    Toujeo SoloStar 300 units/mL CONCENTRATED U-300 injection pen (1-unit dial), Inject 25 Units under the skin daily at bedtime, Disp: 4 5 mL, Rfl: 0    Trulicity 1 5 XT/5 4EG SOPN, inject 0 5 milliliter subcutaneously every week 28, Disp: , Rfl:     glucose blood (OneTouch Ultra) test strip, Test twice daily  , Disp: 100 strip, Rfl: 2    Lancets (onetouch ultrasoft) lancets, Use once daily, Disp: 100 each, Rfl: 2    lidocaine (LIDODERM) 5 %, Apply 2 patches topically daily Remove & Discard patch within 12 hours or as directed by MD (Patient not taking: Reported on 4/8/2022 ), Disp: , Rfl: 0    metoprolol tartrate (LOPRESSOR) 50 mg tablet, Take 1 tablet (50 mg total) by mouth every 12 (twelve) hours (Patient not taking: Reported on 4/8/2022 ), Disp: 60 tablet, Rfl: 0      Objective:    Vitals:   /78   Pulse 80   Temp 97 5 °F (36 4 °C)   Ht 5' 2" (1 575 m)   Wt 122 kg (269 lb)   SpO2 97%   BMI 49 20 kg/m²   Body mass index is 49 2 kg/m²  Vitals:    04/08/22 1314   Weight: 122 kg (269 lb)       Physical Exam  Vitals reviewed  Exam conducted with a chaperone present  Constitutional:       General: She is not in acute distress  Appearance: She is well-developed  She is obese  She is not ill-appearing, toxic-appearing or diaphoretic  HENT:      Head: Normocephalic and atraumatic  Right Ear: External ear normal       Left Ear: External ear normal       Nose: No congestion or rhinorrhea  Eyes:      General: Lids are normal          Right eye: No discharge  Left eye: No discharge  Conjunctiva/sclera: Conjunctivae normal    Neck:      Thyroid: No thyroid mass or thyromegaly  Vascular: No carotid bruit or JVD  Trachea: Trachea normal    Cardiovascular:      Rate and Rhythm: Regular rhythm  Heart sounds: Normal heart sounds  No murmur heard  Pulmonary:      Effort: No respiratory distress  Breath sounds: Normal breath sounds  No wheezing, rhonchi or rales  Abdominal:      General: Bowel sounds are normal  There is no distension  Palpations: There is no mass  Tenderness: There is no abdominal tenderness  There is no rebound  Musculoskeletal:      Right lower leg: Edema present  Left lower leg: Edema present  Lymphadenopathy:      Cervical: No cervical adenopathy  Skin:     General: Skin is warm  Coloration: Skin is not jaundiced or pale  Findings: No rash  Neurological:      Mental Status: She is alert  Coordination: Coordination normal       Gait: Gait abnormal    Psychiatric:         Mood and Affect: Mood normal          Behavior: Behavior normal          Thought Content:  Thought content normal          Judgment: Judgment normal          Lab Review   Lab on 04/06/2022   Component Date Value Ref Range Status    Sodium 04/06/2022 141  136 - 145 mmol/L Final    Potassium 04/06/2022 4 8 3 5 - 5 3 mmol/L Final    Chloride 04/06/2022 104  100 - 108 mmol/L Final    CO2 04/06/2022 34* 21 - 32 mmol/L Final    ANION GAP 04/06/2022 3* 4 - 13 mmol/L Final    BUN 04/06/2022 25  5 - 25 mg/dL Final    Creatinine 04/06/2022 1 25  0 60 - 1 30 mg/dL Final    Standardized to IDMS reference method    Glucose 04/06/2022 173* 65 - 140 mg/dL Final    If the patient is fasting, the ADA then defines impaired fasting glucose as > 100 mg/dL and diabetes as > or equal to 123 mg/dL  Specimen collection should occur prior to Sulfasalazine administration due to the potential for falsely depressed results  Specimen collection should occur prior to Sulfapyridine administration due to the potential for falsely elevated results   Calcium 04/06/2022 8 9  8 3 - 10 1 mg/dL Final    Corrected Calcium 04/06/2022 9 9  8 3 - 10 1 mg/dL Final    AST 04/06/2022 14  5 - 45 U/L Final    Specimen collection should occur prior to Sulfasalazine administration due to the potential for falsely depressed results   ALT 04/06/2022 15  12 - 78 U/L Final    Specimen collection should occur prior to Sulfasalazine and/or Sulfapyridine administration due to the potential for falsely depressed results   Alkaline Phosphatase 04/06/2022 69  46 - 116 U/L Final    Total Protein 04/06/2022 7 2  6 4 - 8 2 g/dL Final    Albumin 04/06/2022 2 8* 3 5 - 5 0 g/dL Final    Total Bilirubin 04/06/2022 0 40  0 20 - 1 00 mg/dL Final    Use of this assay is not recommended for patients undergoing treatment with eltrombopag due to the potential for falsely elevated results      eGFR 04/06/2022 43  ml/min/1 73sq m Final    WBC 04/06/2022 8 60  4 31 - 10 16 Thousand/uL Final    RBC 04/06/2022 3 19* 3 81 - 5 12 Million/uL Final    Hemoglobin 04/06/2022 8 9* 11 5 - 15 4 g/dL Final    Hematocrit 04/06/2022 31 2* 34 8 - 46 1 % Final    MCV 04/06/2022 98  82 - 98 fL Final    MCH 04/06/2022 27 9  26 8 - 34 3 pg Final    [facility] 04/06/2022 28 5* 31 4 - 37 4 g/dL Final    RDW 04/06/2022 15 5* 11 6 - 15 1 % Final    MPV 04/06/2022 10 7  8 9 - 12 7 fL Final    Platelets 93/92/1765 330  149 - 390 Thousands/uL Final    Segmented % 04/06/2022 26* 43 - 75 % Final    Bands % 04/06/2022 11* 0 - 8 % Final    Lymphocytes % 04/06/2022 39  14 - 44 % Final    Monocytes % 04/06/2022 9  4 - 12 % Final    Eosinophils, % 04/06/2022 11* 0 - 6 % Final    Basophils % 04/06/2022 0  0 - 1 % Final    Atypical Lymphocytes % 04/06/2022 4* <=0 % Final    Absolute Neutrophils 04/06/2022 3 18  1 85 - 7 62 Thousand/uL Final    Lymphocytes Absolute 04/06/2022 3 35  0 60 - 4 47 Thousand/uL Final    Monocytes Absolute 04/06/2022 0 77  0 00 - 1 22 Thousand/uL Final    Eosinophils Absolute 04/06/2022 0 95* 0 00 - 0 40 Thousand/uL Final    Basophils Absolute 04/06/2022 0 00  0 00 - 0 10 Thousand/uL Final    Toxic Granulation 04/06/2022 Present   Final    RBC Morphology 04/06/2022 Present   Final    Anisocytosis 04/06/2022 Present   Final    Polychromasia 04/06/2022 Present   Final    Platelet Estimate 75/08/7224 Adequate  Adequate Final    Artifact 04/06/2022 Present   Final   No results displayed because visit has over 200 results        Admission on 03/27/2022, Discharged on 03/28/2022   Component Date Value Ref Range Status    WBC 03/27/2022 13 48* 4 31 - 10 16 Thousand/uL Final    RBC 03/27/2022 3 12* 3 81 - 5 12 Million/uL Final    Hemoglobin 03/27/2022 8 9* 11 5 - 15 4 g/dL Final    Hematocrit 03/27/2022 29 9* 34 8 - 46 1 % Final    MCV 03/27/2022 96  82 - 98 fL Final    MCH 03/27/2022 28 5  26 8 - 34 3 pg Final    MCHC 03/27/2022 29 8* 31 4 - 37 4 g/dL Final    RDW 03/27/2022 15 7* 11 6 - 15 1 % Final    MPV 03/27/2022 11 7  8 9 - 12 7 fL Final    Platelets 30/38/2933 155  149 - 390 Thousands/uL Final    nRBC 03/27/2022 0  /100 WBCs Final    Neutrophils Relative 03/27/2022 79* 43 - 75 % Final    Immat GRANS % 03/27/2022 0  0 - 2 % Final    Lymphocytes Relative 03/27/2022 13* 14 - 44 % Final    Monocytes Relative 03/27/2022 8  4 - 12 % Final    Eosinophils Relative 03/27/2022 0  0 - 6 % Final    Basophils Relative 03/27/2022 0  0 - 1 % Final    Neutrophils Absolute 03/27/2022 10 45* 1 85 - 7 62 Thousands/µL Final    Immature Grans Absolute 03/27/2022 0 06  0 00 - 0 20 Thousand/uL Final    Lymphocytes Absolute 03/27/2022 1 76  0 60 - 4 47 Thousands/µL Final    Monocytes Absolute 03/27/2022 1 12  0 17 - 1 22 Thousand/µL Final    Eosinophils Absolute 03/27/2022 0 05  0 00 - 0 61 Thousand/µL Final    Basophils Absolute 03/27/2022 0 04  0 00 - 0 10 Thousands/µL Final    Sodium 03/27/2022 136  136 - 145 mmol/L Final    Potassium 03/27/2022 4 1  3 5 - 5 3 mmol/L Final    Chloride 03/27/2022 96* 100 - 108 mmol/L Final    CO2 03/27/2022 35* 21 - 32 mmol/L Final    ANION GAP 03/27/2022 5  4 - 13 mmol/L Final    BUN 03/27/2022 31* 5 - 25 mg/dL Final    Creatinine 03/27/2022 1 86* 0 60 - 1 30 mg/dL Final    Standardized to IDMS reference method    Glucose 03/27/2022 358* 65 - 140 mg/dL Final    If the patient is fasting, the ADA then defines impaired fasting glucose as > 100 mg/dL and diabetes as > or equal to 123 mg/dL  Specimen collection should occur prior to Sulfasalazine administration due to the potential for falsely depressed results  Specimen collection should occur prior to Sulfapyridine administration due to the potential for falsely elevated results   Calcium 03/27/2022 7 8* 8 3 - 10 1 mg/dL Final    Corrected Calcium 03/27/2022 8 8  8 3 - 10 1 mg/dL Final    AST 03/27/2022 12  5 - 45 U/L Final    Specimen collection should occur prior to Sulfasalazine administration due to the potential for falsely depressed results   ALT 03/27/2022 20  12 - 78 U/L Final    Specimen collection should occur prior to Sulfasalazine administration due to the potential for falsely depressed results       Alkaline Phosphatase 03/27/2022 90  46 - 116 U/L Final  Total Protein 03/27/2022 6 5  6 4 - 8 2 g/dL Final    Albumin 03/27/2022 2 8* 3 5 - 5 0 g/dL Final    Total Bilirubin 03/27/2022 0 38  0 20 - 1 00 mg/dL Final    Use of this assay is not recommended for patients undergoing treatment with eltrombopag due to the potential for falsely elevated results   eGFR 03/27/2022 27  ml/min/1 73sq m Final    SARS-CoV-2 03/27/2022 Negative  Negative Final    INFLUENZA A PCR 03/27/2022 Negative  Negative Final    INFLUENZA B PCR 03/27/2022 Negative  Negative Final    RSV PCR 03/27/2022 Negative  Negative Final    Color, UA 03/28/2022 Yellow   Final    Clarity, UA 03/28/2022 Slightly Cloudy   Final    Specific [place], UA 03/28/2022 1 020  1 000 - 1 030 Final    pH, UA 03/28/2022 5 0  5 0, 5 5, 6 0, 6 5, 7 0, 7 5, 8 0, 8 5, 9 0 Final    Leukocytes, UA 03/28/2022 Small* Negative Final    Nitrite, UA 03/28/2022 Negative  Negative Final    Protein, UA 03/28/2022 30 (1+)* Negative mg/dl Final    Glucose, UA 03/28/2022 100 (1/10%)* Negative mg/dl Final    Ketones, UA 03/28/2022 Negative  Negative mg/dl Final    Urobilinogen, UA 03/28/2022 0 2  0 2, 1 0 E U /dl E U /dl Final    Bilirubin, UA 03/28/2022 Negative  Negative Final    Blood, UA 03/28/2022 Trace-Intact* Negative Final    Magnesium 03/27/2022 1 9  1 6 - 2 6 mg/dL Final    NT-proBNP 03/27/2022 3,576* <125 pg/mL Final    hs TnI 0hr 03/27/2022 12  "Refer to ACS Flowchart"- see link ng/L Final    Comment:                                              Initial (time 0) result  If >=50 ng/L, Myocardial injury suggested ;  Type of myocardial injury and treatment strategy  to be determined  If 5-49 ng/L, a delta result at 2 hours and or 4 hours will be needed to further evaluate  If <4 ng/L, and chest pain has been >3 hours since onset, patient may qualify for discharge based on the HEART score in the ED    If <5 ng/L and <3hours since onset of chest pain, a delta result at 2 hours will be needed to further evaluate  HS Troponin 99th Percentile URL of a Health Population=12 ng/L with a 95% Confidence Interval of 8-18 ng/L  Second Troponin (time 2 hours)  If calculated delta >= 20 ng/L,  Myocardial injury suggested ; Type of myocardial injury and treatment strategy to be determined  If 5-49 ng/L and the calculated delta is 5-19 ng/L, consult medical service for evaluation  Continue evaluation for ischemia on ecg and other possible etiology and repeat hs troponin at 4 hours  If delta                            is <5 ng/L at 2 hours, consider discharge based on risk stratification via the HEART score (if in ED), or VASILE risk score in IP/Observation  HS Troponin 99th Percentile URL of a Health Population=12 ng/L with a 95% Confidence Interval of 8-18 ng/L     LACTIC ACID 03/27/2022 1 4  0 5 - 2 0 mmol/L Final    Blood Culture 03/27/2022 No Growth After 5 Days  Final    Blood Culture 03/28/2022 No Growth After 5 Days  Final    Protime 03/27/2022 19 2* 11 6 - 14 5 seconds Final    INR 03/27/2022 1 67* 0 84 - 1 19 Final    PTT 03/27/2022 41* 23 - 37 seconds Final    Therapeutic Heparin Range =  60-90 seconds    POC Glucose 03/27/2022 360* 65 - 140 mg/dl Final    hs TnI 2hr 03/28/2022 13  "Refer to ACS Flowchart"- see link ng/L Final    Comment:                                              Initial (time 0) result  If >=50 ng/L, Myocardial injury suggested ;  Type of myocardial injury and treatment strategy  to be determined  If 5-49 ng/L, a delta result at 2 hours and or 4 hours will be needed to further evaluate  If <4 ng/L, and chest pain has been >3 hours since onset, patient may qualify for discharge based on the HEART score in the ED  If <5 ng/L and <3hours since onset of chest pain, a delta result at 2 hours will be needed to further evaluate  HS Troponin 99th Percentile URL of a Health Population=12 ng/L with a 95% Confidence Interval of 8-18 ng/L      Second Troponin (time 2 hours)  If calculated delta >= 20 ng/L,  Myocardial injury suggested ; Type of myocardial injury and treatment strategy to be determined  If 5-49 ng/L and the calculated delta is 5-19 ng/L, consult medical service for evaluation  Continue evaluation for ischemia on ecg and other possible etiology and repeat hs troponin at 4 hours  If delta                            is <5 ng/L at 2 hours, consider discharge based on risk stratification via the HEART score (if in ED), or VASILE risk score in IP/Observation  HS Troponin 99th Percentile URL of a Health Population=12 ng/L with a 95% Confidence Interval of 8-18 ng/L   Delta 2hr hsTnI 03/28/2022 1  <20 ng/L Final    RBC, UA 03/28/2022 2-4  None Seen, 0-1, 1-2, 2-4, 0-5 /hpf Final    WBC, UA 03/28/2022 30-50* None Seen, 0-1, 1-2, 0-5, 2-4 /hpf Final    Epithelial Cells 03/28/2022 Occasional  None Seen, Occasional /hpf Final    Bacteria, UA 03/28/2022 Innumerable* None Seen, Occasional /hpf Final    Hyaline Casts, UA 03/28/2022 2-4* (none) /lpf Final    Fine granular casts 03/28/2022 1-2  /lpf Final    WBC Clumps 03/28/2022 Occasional   Final    OTHER OBSERVATIONS 03/28/2022 Transitional Epithelial Cells   Final    Urine Culture 03/28/2022 50,000-59,000 cfu/ml    Final    Mixed Contaminants X4    Ventricular Rate 03/27/2022 98  BPM Final    Atrial Rate 03/27/2022 98  BPM Final    MO Interval 03/27/2022 150  ms Final    QRSD Interval 03/27/2022 74  ms Final    QT Interval 03/27/2022 352  ms Final    QTC Interval 03/27/2022 449  ms Final    P Axis 03/27/2022 19  degrees Final    QRS Axis 03/27/2022 -26  degrees Final    T Wave [place] 03/27/2022 20  degrees Final   No results displayed because visit has over 200 results        Admission on 03/08/2022, Discharged on 03/09/2022   Component Date Value Ref Range Status    WBC 03/08/2022 6 02  4 31 - 10 16 Thousand/uL Final    RBC 03/08/2022 3 11* 3 81 - 5 12 Million/uL Final    Hemoglobin 03/08/2022 8 8* 11 5 - 15 4 g/dL Final    Hematocrit 03/08/2022 29 9* 34 8 - 46 1 % Final    MCV 03/08/2022 96  82 - 98 fL Final    MCH 03/08/2022 28 3  26 8 - 34 3 pg Final    MCHC 03/08/2022 29 4* 31 4 - 37 4 g/dL Final    RDW 03/08/2022 15 8* 11 6 - 15 1 % Final    MPV 03/08/2022 9 8  8 9 - 12 7 fL Final    Platelets 98/60/8974 331  149 - 390 Thousands/uL Final    nRBC 03/08/2022 0  /100 WBCs Final    Neutrophils Relative 03/08/2022 58  43 - 75 % Final    Immat GRANS % 03/08/2022 1  0 - 2 % Final    Lymphocytes Relative 03/08/2022 21  14 - 44 % Final    Monocytes Relative 03/08/2022 15* 4 - 12 % Final    Eosinophils Relative 03/08/2022 4  0 - 6 % Final    Basophils Relative 03/08/2022 1  0 - 1 % Final    Neutrophils Absolute 03/08/2022 3 51  1 85 - 7 62 Thousands/µL Final    Immature Grans Absolute 03/08/2022 0 03  0 00 - 0 20 Thousand/uL Final    Lymphocytes Absolute 03/08/2022 1 28  0 60 - 4 47 Thousands/µL Final    Monocytes Absolute 03/08/2022 0 93  0 17 - 1 22 Thousand/µL Final    Eosinophils Absolute 03/08/2022 0 21  0 00 - 0 61 Thousand/µL Final    Basophils Absolute 03/08/2022 0 06  0 00 - 0 10 Thousands/µL Final    Protime 03/08/2022 16 4* 11 6 - 14 5 seconds Final    INR 03/08/2022 1 36* 0 84 - 1 19 Final    PTT 03/08/2022 41* 23 - 37 seconds Final    Therapeutic Heparin Range =  60-90 seconds    Sodium 03/08/2022 145  136 - 145 mmol/L Final    Potassium 03/08/2022 4 6  3 5 - 5 3 mmol/L Final    Chloride 03/08/2022 107  100 - 108 mmol/L Final    CO2 03/08/2022 27  21 - 32 mmol/L Final    ANION GAP 03/08/2022 11  4 - 13 mmol/L Final    BUN 03/08/2022 37* 5 - 25 mg/dL Final    Creatinine 03/08/2022 1 24  0 60 - 1 30 mg/dL Final    Standardized to IDMS reference method    Glucose 03/08/2022 122  65 - 140 mg/dL Final    If the patient is fasting, the ADA then defines impaired fasting glucose as > 100 mg/dL and diabetes as > or equal to 123 mg/dL    Specimen collection should occur prior to Sulfasalazine administration due to the potential for falsely depressed results  Specimen collection should occur prior to Sulfapyridine administration due to the potential for falsely elevated results   Calcium 03/08/2022 8 2* 8 3 - 10 1 mg/dL Final    Corrected Calcium 03/08/2022 9 6  8 3 - 10 1 mg/dL Final    AST 03/08/2022 22  5 - 45 U/L Final    Specimen collection should occur prior to Sulfasalazine administration due to the potential for falsely depressed results   ALT 03/08/2022 18  12 - 78 U/L Final    Specimen collection should occur prior to Sulfasalazine administration due to the potential for falsely depressed results   Alkaline Phosphatase 03/08/2022 83  46 - 116 U/L Final    Total Protein 03/08/2022 6 2* 6 4 - 8 2 g/dL Final    Albumin 03/08/2022 2 3* 3 5 - 5 0 g/dL Final    Total Bilirubin 03/08/2022 0 52  0 20 - 1 00 mg/dL Final    Use of this assay is not recommended for patients undergoing treatment with eltrombopag due to the potential for falsely elevated results   eGFR 03/08/2022 44  ml/min/1 73sq m Final    hs TnI 0hr 03/08/2022 7  "Refer to ACS Flowchart"- see link ng/L Final    Comment:                                              Initial (time 0) result  If >=50 ng/L, Myocardial injury suggested ;  Type of myocardial injury and treatment strategy  to be determined  If 5-49 ng/L, a delta result at 2 hours and or 4 hours will be needed to further evaluate  If <4 ng/L, and chest pain has been >3 hours since onset, patient may qualify for discharge based on the HEART score in the ED  If <5 ng/L and <3hours since onset of chest pain, a delta result at 2 hours will be needed to further evaluate  HS Troponin 99th Percentile URL of a Health Population=12 ng/L with a 95% Confidence Interval of 8-18 ng/L  Second Troponin (time 2 hours)  If calculated delta >= 20 ng/L,  Myocardial injury suggested ; Type of myocardial injury and treatment strategy to be determined    If 5-49 ng/L and the calculated delta is 5-19 ng/L, consult medical service for evaluation  Continue evaluation for ischemia on ecg and other possible etiology and repeat hs troponin at 4 hours  If delta                            is <5 ng/L at 2 hours, consider discharge based on risk stratification via the HEART score (if in ED), or VASILE risk score in IP/Observation  HS Troponin 99th Percentile URL of a Health Population=12 ng/L with a 95% Confidence Interval of 8-18 ng/L   Magnesium 03/08/2022 2 5  1 6 - 2 6 mg/dL Final    hs TnI 2hr 03/08/2022 7  "Refer to ACS Flowchart"- see link ng/L Final    Comment:                                              Initial (time 0) result  If >=50 ng/L, Myocardial injury suggested ;  Type of myocardial injury and treatment strategy  to be determined  If 5-49 ng/L, a delta result at 2 hours and or 4 hours will be needed to further evaluate  If <4 ng/L, and chest pain has been >3 hours since onset, patient may qualify for discharge based on the HEART score in the ED  If <5 ng/L and <3hours since onset of chest pain, a delta result at 2 hours will be needed to further evaluate  HS Troponin 99th Percentile URL of a Health Population=12 ng/L with a 95% Confidence Interval of 8-18 ng/L  Second Troponin (time 2 hours)  If calculated delta >= 20 ng/L,  Myocardial injury suggested ; Type of myocardial injury and treatment strategy to be determined  If 5-49 ng/L and the calculated delta is 5-19 ng/L, consult medical service for evaluation  Continue evaluation for ischemia on ecg and other possible etiology and repeat hs troponin at 4 hours  If delta                            is <5 ng/L at 2 hours, consider discharge based on risk stratification via the HEART score (if in ED), or VASILE risk score in IP/Observation  HS Troponin 99th Percentile URL of a Health Population=12 ng/L with a 95% Confidence Interval of 8-18 ng/L      Delta 2hr hsTnI 03/08/2022 0  <20 ng/L Final    Color, UA 03/08/2022 Yellow   Final    Clarity, UA 03/08/2022 Slightly Cloudy   Final    Specific [place], UA 03/08/2022 1 020  1 000 - 1 030 Final    pH, UA 03/08/2022 6 0  5 0, 5 5, 6 0, 6 5, 7 0, 7 5, 8 0, 8 5, 9 0 Final    Leukocytes, UA 03/08/2022 Negative  Negative Final    Nitrite, UA 03/08/2022 Negative  Negative Final    Protein, UA 03/08/2022 30 (1+)* Negative mg/dl Final    Glucose, UA 03/08/2022 Negative  Negative mg/dl Final    Ketones, UA 03/08/2022 Trace* Negative mg/dl Final    Urobilinogen, UA 03/08/2022 0 2  0 2, 1 0 E U /dl E U /dl Final    Bilirubin, UA 03/08/2022 Negative  Negative Final    Blood, UA 03/08/2022 Trace-lysed* Negative Final    RBC, UA 03/08/2022 0-1  None Seen, 0-1, 1-2, 2-4, 0-5 /hpf Final    WBC, UA 03/08/2022 2-4  None Seen, 0-1, 1-2, 0-5, 2-4 /hpf Final    Epithelial Cells 03/08/2022 Moderate* None Seen, Occasional /hpf Final    Bacteria, UA 03/08/2022 Moderate* None Seen, Occasional /hpf Final    Hyaline Casts, UA 03/08/2022 1-2* (none) /lpf Final    MUCUS THREADS 03/08/2022 Occasional* None Seen Final    POC Glucose 03/09/2022 135  65 - 140 mg/dl Final    Ventricular Rate 03/08/2022 83  BPM Final    Atrial Rate 03/08/2022 83  BPM Final    MO Interval 03/08/2022 168  ms Final    QRSD Interval 03/08/2022 72  ms Final    QT Interval 03/08/2022 382  ms Final    QTC Interval 03/08/2022 448  ms Final    P Axis 03/08/2022 40  degrees Final    QRS Axis 03/08/2022 -10  degrees Final    T Wave [place] 03/08/2022 17  degrees Final    Ventricular Rate 03/08/2022 106  BPM Final    QRSD Interval 03/08/2022 72  ms Final    QT Interval 03/08/2022 310  ms Final    QTC Interval 03/08/2022 411  ms Final    QRS Axis 03/08/2022 -5  degrees Final    T Wave [place] 03/08/2022 12  degrees Final    Ventricular Rate 03/08/2022 127  BPM Final    Atrial Rate 03/08/2022 127  BPM Final    QRSD Interval 03/08/2022 56  ms Final    QT Interval 03/08/2022 314  ms Final    QTC Interval 03/08/2022 456  ms Final    QRS Axis 03/08/2022 -2  degrees Final    T Wave [place] 03/08/2022 54  degrees Final   No results displayed because visit has over 200 results  Admission on 02/16/2022, Discharged on 02/20/2022   Component Date Value Ref Range Status    Sodium 02/16/2022 142  136 - 145 mmol/L Final    Potassium 02/16/2022 4 8  3 5 - 5 3 mmol/L Final    Chloride 02/16/2022 103  100 - 108 mmol/L Final    CO2 02/16/2022 29  21 - 32 mmol/L Final    ANION GAP 02/16/2022 10  4 - 13 mmol/L Final    BUN 02/16/2022 39* 5 - 25 mg/dL Final    Creatinine 02/16/2022 1 52* 0 60 - 1 30 mg/dL Final    Standardized to IDMS reference method    Glucose 02/16/2022 305* 65 - 140 mg/dL Final    If the patient is fasting, the ADA then defines impaired fasting glucose as > 100 mg/dL and diabetes as > or equal to 123 mg/dL  Specimen collection should occur prior to Sulfasalazine administration due to the potential for falsely depressed results  Specimen collection should occur prior to Sulfapyridine administration due to the potential for falsely elevated results   Calcium 02/16/2022 8 1* 8 3 - 10 1 mg/dL Final    Corrected Calcium 02/16/2022 8 7  8 3 - 10 1 mg/dL Final    AST 02/16/2022 17  5 - 45 U/L Final    Specimen collection should occur prior to Sulfasalazine administration due to the potential for falsely depressed results   ALT 02/16/2022 27  12 - 78 U/L Final    Specimen collection should occur prior to Sulfasalazine administration due to the potential for falsely depressed results   Alkaline Phosphatase 02/16/2022 74  46 - 116 U/L Final    Total Protein 02/16/2022 6 9  6 4 - 8 2 g/dL Final    Albumin 02/16/2022 3 2* 3 5 - 5 0 g/dL Final    Total Bilirubin 02/16/2022 0 38  0 20 - 1 00 mg/dL Final    Use of this assay is not recommended for patients undergoing treatment with eltrombopag due to the potential for falsely elevated results      eGFR 02/16/2022 34  ml/min/1 73sq m Final    WBC 02/16/2022 11 92* 4 31 - 10 16 Thousand/uL Final    RBC 02/16/2022 4 00  3 81 - 5 12 Million/uL Final    Hemoglobin 02/16/2022 11 7  11 5 - 15 4 g/dL Final    Hematocrit 02/16/2022 36 7  34 8 - 46 1 % Final    MCV 02/16/2022 92  82 - 98 fL Final    MCH 02/16/2022 29 3  26 8 - 34 3 pg Final    MCHC 02/16/2022 31 9  31 4 - 37 4 g/dL Final    RDW 02/16/2022 15 1  11 6 - 15 1 % Final    MPV 02/16/2022 11 9  8 9 - 12 7 fL Final    Platelets 94/37/8318 188  149 - 390 Thousands/uL Final    nRBC 02/16/2022 0  /100 WBCs Final    Neutrophils Relative 02/16/2022 68  43 - 75 % Final    Immat GRANS % 02/16/2022 1  0 - 2 % Final    Lymphocytes Relative 02/16/2022 21  14 - 44 % Final    Monocytes Relative 02/16/2022 8  4 - 12 % Final    Eosinophils Relative 02/16/2022 1  0 - 6 % Final    Basophils Relative 02/16/2022 1  0 - 1 % Final    Neutrophils Absolute 02/16/2022 8 06* 1 85 - 7 62 Thousands/µL Final    Immature Grans Absolute 02/16/2022 0 09  0 00 - 0 20 Thousand/uL Final    Lymphocytes Absolute 02/16/2022 2 50  0 60 - 4 47 Thousands/µL Final    Monocytes Absolute 02/16/2022 0 99  0 17 - 1 22 Thousand/µL Final    Eosinophils Absolute 02/16/2022 0 16  0 00 - 0 61 Thousand/µL Final    Basophils Absolute 02/16/2022 0 12* 0 00 - 0 10 Thousands/µL Final    Protime 02/16/2022 14 3  11 6 - 14 5 seconds Final    INR 02/16/2022 1 13  0 84 - 1 19 Final    PTT 02/16/2022 31  23 - 37 seconds Final    Therapeutic Heparin Range =  60-90 seconds    POC Glucose 02/17/2022 442* 65 - 140 mg/dl Final    POC Glucose 02/17/2022 469* 65 - 140 mg/dl Final    POC Glucose 02/17/2022 430* 65 - 140 mg/dl Final    POC Glucose 02/17/2022 331* 65 - 140 mg/dl Final    WBC 02/18/2022 10 85* 4 31 - 10 16 Thousand/uL Final    RBC 02/18/2022 2 93* 3 81 - 5 12 Million/uL Final    Hemoglobin 02/18/2022 8 5* 11 5 - 15 4 g/dL Final    Results verified by repeat    Hematocrit 02/18/2022 27 6* 34 8 - 46 1 % Final    MCV 02/18/2022 94  82 - 98 fL Final    MCH 02/18/2022 29 0  26 8 - 34 3 pg Final    MCHC 02/18/2022 30 8* 31 4 - 37 4 g/dL Final    RDW 02/18/2022 15 7* 11 6 - 15 1 % Final    MPV 02/18/2022 11 4  8 9 - 12 7 fL Final    Platelets 06/50/1983 168  149 - 390 Thousands/uL Final    nRBC 02/18/2022 0  /100 WBCs Final    This is an appended report  These results have been appended to a previously preliminary verified report   Neutrophils Relative 02/18/2022 62  43 - 75 % Final    Immat GRANS % 02/18/2022 1  0 - 2 % Final    Lymphocytes Relative 02/18/2022 25  14 - 44 % Final    Monocytes Relative 02/18/2022 10  4 - 12 % Final    Eosinophils Relative 02/18/2022 1  0 - 6 % Final    Basophils Relative 02/18/2022 1  0 - 1 % Final    Neutrophils Absolute 02/18/2022 6 81  1 85 - 7 62 Thousands/µL Final    Immature Grans Absolute 02/18/2022 0 08  0 00 - 0 20 Thousand/uL Final    Lymphocytes Absolute 02/18/2022 2 73  0 60 - 4 47 Thousands/µL Final    Monocytes Absolute 02/18/2022 1 07  0 17 - 1 22 Thousand/µL Final    Eosinophils Absolute 02/18/2022 0 08  0 00 - 0 61 Thousand/µL Final    Basophils Absolute 02/18/2022 0 08  0 00 - 0 10 Thousands/µL Final    Sodium 02/18/2022 136  136 - 145 mmol/L Final    Potassium 02/18/2022 5 7* 3 5 - 5 3 mmol/L Final    Chloride 02/18/2022 102  100 - 108 mmol/L Final    CO2 02/18/2022 25  21 - 32 mmol/L Final    ANION GAP 02/18/2022 9  4 - 13 mmol/L Final    BUN 02/18/2022 65* 5 - 25 mg/dL Final    Creatinine 02/18/2022 3 32* 0 60 - 1 30 mg/dL Final    Verified by repeat analysis    Glucose 02/18/2022 115  65 - 140 mg/dL Final    If the patient is fasting, the ADA then defines impaired fasting glucose as > 100 mg/dL and diabetes as > or equal to 123 mg/dL  Specimen collection should occur prior to Sulfasalazine administration due to the potential for falsely depressed results   Specimen collection should occur prior to Sulfapyridine administration due to the potential for falsely elevated results      Calcium 02/18/2022 7 5* 8 3 - 10 1 mg/dL Final    eGFR 02/18/2022 13  ml/min/1 73sq m Final    POC Glucose 02/18/2022 117  65 - 140 mg/dl Final    POC Glucose 02/18/2022 165* 65 - 140 mg/dl Final    POC Glucose 02/18/2022 304* 65 - 140 mg/dl Final    POC Glucose 02/18/2022 241* 65 - 140 mg/dl Final    WBC 02/19/2022 9 79  4 31 - 10 16 Thousand/uL Final    RBC 02/19/2022 2 68* 3 81 - 5 12 Million/uL Final    Hemoglobin 02/19/2022 7 9* 11 5 - 15 4 g/dL Final    Hematocrit 02/19/2022 24 7* 34 8 - 46 1 % Final    MCV 02/19/2022 92  82 - 98 fL Final    MCH 02/19/2022 29 5  26 8 - 34 3 pg Final    MCHC 02/19/2022 32 0  31 4 - 37 4 g/dL Final    RDW 02/19/2022 15 5* 11 6 - 15 1 % Final    MPV 02/19/2022 11 4  8 9 - 12 7 fL Final    Platelets 70/40/2535 167  149 - 390 Thousands/uL Final    nRBC 02/19/2022 0  /100 WBCs Final    Neutrophils Relative 02/19/2022 58  43 - 75 % Final    Immat GRANS % 02/19/2022 1  0 - 2 % Final    Lymphocytes Relative 02/19/2022 28  14 - 44 % Final    Monocytes Relative 02/19/2022 10  4 - 12 % Final    Eosinophils Relative 02/19/2022 2  0 - 6 % Final    Basophils Relative 02/19/2022 1  0 - 1 % Final    Neutrophils Absolute 02/19/2022 5 75  1 85 - 7 62 Thousands/µL Final    Immature Grans Absolute 02/19/2022 0 10  0 00 - 0 20 Thousand/uL Final    Lymphocytes Absolute 02/19/2022 2 73  0 60 - 4 47 Thousands/µL Final    Monocytes Absolute 02/19/2022 0 93  0 17 - 1 22 Thousand/µL Final    Eosinophils Absolute 02/19/2022 0 18  0 00 - 0 61 Thousand/µL Final    Basophils Absolute 02/19/2022 0 10  0 00 - 0 10 Thousands/µL Final    Sodium 02/19/2022 139  136 - 145 mmol/L Final    Potassium 02/19/2022 5 2  3 5 - 5 3 mmol/L Final    Chloride 02/19/2022 104  100 - 108 mmol/L Final    CO2 02/19/2022 28  21 - 32 mmol/L Final    ANION GAP 02/19/2022 7  4 - 13 mmol/L Final    BUN 02/19/2022 69* 5 - 25 mg/dL Final    Creatinine 02/19/2022 2 47* 0 60 - 1 30 mg/dL Final    Standardized to IDMS reference method    Glucose 02/19/2022 119  65 - 140 mg/dL Final    If the patient is fasting, the ADA then defines impaired fasting glucose as > 100 mg/dL and diabetes as > or equal to 123 mg/dL  Specimen collection should occur prior to Sulfasalazine administration due to the potential for falsely depressed results  Specimen collection should occur prior to Sulfapyridine administration due to the potential for falsely elevated results   Calcium 02/19/2022 6 8* 8 3 - 10 1 mg/dL Final    eGFR 02/19/2022 19  ml/min/1 73sq m Final    POC Glucose 02/19/2022 89  65 - 140 mg/dl Final    POC Glucose 02/19/2022 134  65 - 140 mg/dl Final    POC Glucose 02/19/2022 299* 65 - 140 mg/dl Final    POC Glucose 02/19/2022 426* 65 - 140 mg/dl Final    Sodium 02/20/2022 141  136 - 145 mmol/L Final    Potassium 02/20/2022 4 6  3 5 - 5 3 mmol/L Final    Chloride 02/20/2022 108  100 - 108 mmol/L Final    CO2 02/20/2022 26  21 - 32 mmol/L Final    ANION GAP 02/20/2022 7  4 - 13 mmol/L Final    BUN 02/20/2022 54* 5 - 25 mg/dL Final    Creatinine 02/20/2022 1 68* 0 60 - 1 30 mg/dL Final    Standardized to IDMS reference method    Glucose 02/20/2022 110  65 - 140 mg/dL Final    If the patient is fasting, the ADA then defines impaired fasting glucose as > 100 mg/dL and diabetes as > or equal to 123 mg/dL  Specimen collection should occur prior to Sulfasalazine administration due to the potential for falsely depressed results  Specimen collection should occur prior to Sulfapyridine administration due to the potential for falsely elevated results      Calcium 02/20/2022 7 0* 8 3 - 10 1 mg/dL Final    eGFR 02/20/2022 30  ml/min/1 73sq m Final    POC Glucose 02/20/2022 114  65 - 140 mg/dl Final    POC Glucose 02/20/2022 181* 65 - 140 mg/dl Final    POC Glucose 02/20/2022 270* 65 - 140 mg/dl Final         Patient Instructions   Hold your metoprolol if your systolic blood pressure less than 100 or heart rate less than 60    Continue to weigh yourself every day  Your today and last couple of days weight average will be your dry weight which should be  262 lb  Your range will be 259-265 lb    Continue to check your blood pressure blood sugar weight  Keep your appointment with your cardiologist and nephrologist next month  Continue skin check every day  If your 1 is not completely healed you let me know  Follow with Consultants as per their and our suggestion    Follow up in 2  week(s) or as needed earlier    Follow all instructions as advised and discussed  Take your medications as prescribed  Call the office immediately if you experience any side effects  Ask questions if you do not understand  Keep your scheduled appointment as advised or come sooner if necessary or in doubt  Best time to call for non-urgent matter or questions on weekdays is between 9am and 12 noon  See physician for any new symptoms or worsening of current symptoms  Urgent or emergent situations call 911 and report to nearest emergency room  I spent  45 minutes taking care of this patient including clinical care, conseling, collaboration, chart, lab and consultaion review  Please watch your skin condition closely  If there is any change or any worsening I need to know a need to see you will need to refer you to the wound care center etc   But as per you things are getting much better and family will keep an eye on that  You will need follow-up CT scan of the chest in 3 months for your lung nodule follow-up  I already ordered that for you       Dr Tiffany Gee MD  [facility]       "This note has been constructed using a voice recognition system  Therefore there may be syntax, spelling, and/or grammatical errors   Please call if you have any questions  "

## 2022-04-08 NOTE — ASSESSMENT & PLAN NOTE
Wt Readings from Last 3 Encounters:   04/08/22 122 kg (269 lb)   04/02/22 121 kg (266 lb 15 6 oz)   03/27/22 117 kg (258 lb)       Chronic diastolic CHF 2 by and large compensated    Will continue torsemide, metoprolol, may benefit from additional medication for CHF patient is awaiting cardiologist follow-up

## 2022-04-08 NOTE — ASSESSMENT & PLAN NOTE
Per patient and family member it is much better  Patient does not want me to examine  They will keep an eye  If it is any worse they will out let me know      Flank area as well as the shoulder area is improved per family

## 2022-04-08 NOTE — TELEPHONE ENCOUNTER
Left detailed message for patient asking if we could have her come in earlier for her appointment due to us trying to get out a little bit earlier for the holiday weekend

## 2022-04-08 NOTE — ASSESSMENT & PLAN NOTE
Blood pressure is well controlled  Continue torsemide as well as metoprolol      Patient will continue to monitor blood pressure at home and bring the diary    Hold metoprolol if blood pressure less than 100 or heart rate less than 60

## 2022-04-11 ENCOUNTER — PATIENT OUTREACH (OUTPATIENT)
Dept: INTERNAL MEDICINE CLINIC | Facility: CLINIC | Age: 72
End: 2022-04-11

## 2022-04-11 ENCOUNTER — TELEPHONE (OUTPATIENT)
Dept: INPATIENT UNIT | Facility: HOSPITAL | Age: 72
End: 2022-04-11

## 2022-04-11 NOTE — PROGRESS NOTES
Call placed to patient  brian Sorensonallis reports she is just getting out of the shower and is getting ready for physical therapy  Tracy Jackman requests that this CM call back this afternoon  This CM will call back later today  Placed call to patient  kelinjj Sorensonallis reports she had a PT appointment and she is doing well with it, however Tracy Jackman is concerned about having outpatient PT because of her oxygen equipment  She would prefer having PT at home  It's too difficult to travel with the oxygen because the tank may run out before the session is over  Tracy Jackman is also concerned that her heart rate will get too high as well  She tells this CM she is very nervous about having to go out to get to PT  Tracy Jackman thanked this CM for checking in on her  This note routed to PCP and Dr Evi Webster

## 2022-04-11 NOTE — TELEPHONE ENCOUNTER
CHF Evaluation/Screening Form    Patient Level Data  Encounter Level Data  Knowledge Assessment/Post-Discharge Questions  Following Diet: Foods to Limit/Avoid Salt: Yes  Daily Weights Done?: Yes  Date of last weight: 4/11/22  Weight recorded: 265  Knows name of medication/water pill?: Yes  Understands Activity/Exercise:  Yes  Knows when to report symptoms?: Yes  Plan in place to call for worsening symptoms?: Yes  Does the patient have a means of transporation?: Yes

## 2022-04-12 ENCOUNTER — LAB (OUTPATIENT)
Dept: LAB | Facility: CLINIC | Age: 72
End: 2022-04-12
Payer: MEDICARE

## 2022-04-12 ENCOUNTER — APPOINTMENT (OUTPATIENT)
Dept: LAB | Facility: CLINIC | Age: 72
End: 2022-04-12
Payer: MEDICARE

## 2022-04-12 DIAGNOSIS — N18.32 STAGE 3B CHRONIC KIDNEY DISEASE (HCC): ICD-10-CM

## 2022-04-12 DIAGNOSIS — D64.9 ANEMIA, UNSPECIFIED TYPE: ICD-10-CM

## 2022-04-12 DIAGNOSIS — R35.0 URINE FREQUENCY: ICD-10-CM

## 2022-04-12 DIAGNOSIS — E11.00 TYPE 2 DIABETES MELLITUS WITH HYPEROSMOLARITY WITHOUT COMA, WITHOUT LONG-TERM CURRENT USE OF INSULIN (HCC): Primary | ICD-10-CM

## 2022-04-12 DIAGNOSIS — I10 ESSENTIAL HYPERTENSION: ICD-10-CM

## 2022-04-12 DIAGNOSIS — E11.00 TYPE 2 DIABETES MELLITUS WITH HYPEROSMOLARITY WITHOUT COMA, WITHOUT LONG-TERM CURRENT USE OF INSULIN (HCC): ICD-10-CM

## 2022-04-12 LAB
ALBUMIN SERPL BCP-MCNC: 3 G/DL (ref 3.5–5)
ALP SERPL-CCNC: 76 U/L (ref 46–116)
ALT SERPL W P-5'-P-CCNC: 18 U/L (ref 12–78)
ANION GAP SERPL CALCULATED.3IONS-SCNC: 4 MMOL/L (ref 4–13)
AST SERPL W P-5'-P-CCNC: 25 U/L (ref 5–45)
BASOPHILS # BLD AUTO: 0.14 THOUSANDS/ΜL (ref 0–0.1)
BASOPHILS NFR BLD AUTO: 2 % (ref 0–1)
BILIRUB SERPL-MCNC: 0.18 MG/DL (ref 0.2–1)
BILIRUB UR QL STRIP: NEGATIVE
BUN SERPL-MCNC: 24 MG/DL (ref 5–25)
CALCIUM ALBUM COR SERPL-MCNC: 9.8 MG/DL (ref 8.3–10.1)
CALCIUM SERPL-MCNC: 9 MG/DL (ref 8.3–10.1)
CHLORIDE SERPL-SCNC: 104 MMOL/L (ref 100–108)
CLARITY UR: CLEAR
CO2 SERPL-SCNC: 29 MMOL/L (ref 21–32)
COLOR UR: COLORLESS
CREAT SERPL-MCNC: 1.12 MG/DL (ref 0.6–1.3)
EOSINOPHIL # BLD AUTO: 0.43 THOUSAND/ΜL (ref 0–0.61)
EOSINOPHIL NFR BLD AUTO: 5 % (ref 0–6)
ERYTHROCYTE [DISTWIDTH] IN BLOOD BY AUTOMATED COUNT: 15.7 % (ref 11.6–15.1)
GFR SERPL CREATININE-BSD FRML MDRD: 50 ML/MIN/1.73SQ M
GLUCOSE SERPL-MCNC: 195 MG/DL (ref 65–140)
GLUCOSE UR STRIP-MCNC: NEGATIVE MG/DL
HCT VFR BLD AUTO: 33.2 % (ref 34.8–46.1)
HGB BLD-MCNC: 9.6 G/DL (ref 11.5–15.4)
HGB UR QL STRIP.AUTO: NEGATIVE
IMM GRANULOCYTES # BLD AUTO: 0.06 THOUSAND/UL (ref 0–0.2)
IMM GRANULOCYTES NFR BLD AUTO: 1 % (ref 0–2)
KETONES UR STRIP-MCNC: NEGATIVE MG/DL
LEUKOCYTE ESTERASE UR QL STRIP: NEGATIVE
LYMPHOCYTES # BLD AUTO: 2.03 THOUSANDS/ΜL (ref 0.6–4.47)
LYMPHOCYTES NFR BLD AUTO: 23 % (ref 14–44)
MCH RBC QN AUTO: 28.1 PG (ref 26.8–34.3)
MCHC RBC AUTO-ENTMCNC: 28.9 G/DL (ref 31.4–37.4)
MCV RBC AUTO: 97 FL (ref 82–98)
MONOCYTES # BLD AUTO: 0.8 THOUSAND/ΜL (ref 0.17–1.22)
MONOCYTES NFR BLD AUTO: 9 % (ref 4–12)
NEUTROPHILS # BLD AUTO: 5.28 THOUSANDS/ΜL (ref 1.85–7.62)
NEUTS SEG NFR BLD AUTO: 60 % (ref 43–75)
NITRITE UR QL STRIP: NEGATIVE
NRBC BLD AUTO-RTO: 0 /100 WBCS
PH UR STRIP.AUTO: 5.5 [PH]
PLATELET # BLD AUTO: 212 THOUSANDS/UL (ref 149–390)
PMV BLD AUTO: 10.8 FL (ref 8.9–12.7)
POTASSIUM SERPL-SCNC: 5.1 MMOL/L (ref 3.5–5.3)
PROT SERPL-MCNC: 7.4 G/DL (ref 6.4–8.2)
PROT UR STRIP-MCNC: NEGATIVE MG/DL
RBC # BLD AUTO: 3.42 MILLION/UL (ref 3.81–5.12)
SODIUM SERPL-SCNC: 137 MMOL/L (ref 136–145)
SP GR UR STRIP.AUTO: 1.01 (ref 1–1.03)
UROBILINOGEN UR STRIP-ACNC: <2 MG/DL
WBC # BLD AUTO: 8.74 THOUSAND/UL (ref 4.31–10.16)

## 2022-04-12 PROCEDURE — 81003 URINALYSIS AUTO W/O SCOPE: CPT

## 2022-04-12 PROCEDURE — 36415 COLL VENOUS BLD VENIPUNCTURE: CPT

## 2022-04-12 PROCEDURE — 85025 COMPLETE CBC W/AUTO DIFF WBC: CPT

## 2022-04-12 PROCEDURE — 87086 URINE CULTURE/COLONY COUNT: CPT

## 2022-04-12 PROCEDURE — 80053 COMPREHEN METABOLIC PANEL: CPT

## 2022-04-13 ENCOUNTER — TELEPHONE (OUTPATIENT)
Dept: OBGYN CLINIC | Facility: HOSPITAL | Age: 72
End: 2022-04-13

## 2022-04-13 LAB — BACTERIA UR CULT: NORMAL

## 2022-04-13 NOTE — TELEPHONE ENCOUNTER
Spoke to Jody Parsons from [facility] who said patient is already getting Home PT services  I called patient to clarify to make sure she is not going to out-patient PT  Patient stated the therapist said they may end up sending her to out patient PT which is what concerned her   I called back Jody Parsons and patient will continue getting Home PT

## 2022-04-13 NOTE — TELEPHONE ENCOUNTER
Patient sees Dr Eliza Salas from [facility] is calling because they recevied a referral for home PT for this patient but according to the notes, the PT is seeing outpatient PT and she cannot have both at the same time        Please advise      CB: [phone]

## 2022-04-14 NOTE — PROGRESS NOTES
NEPHROLOGY OFFICE VISIT   Mirna Chase 73 y o  female MRN: [id number]  4/18/2022    Reason for Visit: Follow up    INTERVAL HISTORY and SUBJECTIVE:    I had the pleasure of seeing Luis Lucero today in the renal clinic for hospital follow-up/CKD  She is a 79-year-old female who was recently seen during hospitalization at [facility] due to sepsis related to UTI  She has a history of chronic diastolic CHF, asthma, diabetes mellitus, GERD, hyperlipidemia, atrial fibrillation on Eliquis, SVT, CKD stage 3, anemia, recent left shoulder replacement on March 1, 2022, chronic oxygen use on 3 L nasal cannula  Admission complicated by atrial fibrillation with RVR due to sepsis  Making progress and feeling better since hospitalization  Weighing herself every day and today weight was 262 which is the lower end of her usual baseline although lower extremity edema seems to be increasing  Feels that she may have lost weight since she was not eating well during hospitalization  No increasing shortness of breath  No nausea, vomiting, diarrhea  No bloody stools or evidence of bleeding  ASSESSMENT AND PLAN:    Chronic kidney disease, stage III:  · Previous baseline creatinine 1 1-1 4 mg/dL  · Follows with Dr Dian Horton  · During hospitalization earlier this month it was felt that she will likely need to tolerate a higher creatinine to keep out of CHF  · She was hospitalized in March and in April  Required diuresis due to CHF  · It was felt that she would likely have a baseline creatinine near 1 5-2 2 depending on volume status  · Follow-up creatinine 1 25-->1 12  · Urinalysis unrevealing, ultrasound no hydronephrosis  · No function stable  Follow-up in 3 months  · BMP in 6 weeks  · Follow-up blood work and urine studies prior to the next appointment in 3 months      Hypertension:  · Medications:  Metoprolol 50 mg b i d , torsemide 10 mg daily  · Volume status: Edema LE, Weight between 262-266 lbs  · Blood pressure adequately controlled during hospitalization  · Plan:  Increase torsemide to alternating doses of 10 mg/20 mg every other day  · Continue daily weight  If edema improves torsemide dose can be reduced to 10 mg daily  Diastolic CHF:  · Discharged from the hospital on torsemide 10 mg daily  · Volume status:  +2 lower extremity edema  Suspect weight loss since she is at the lower end of her usual weight  · Will increase torsemide to alternating doses of 10/20 every other day see if that helps with edema  · Small right pleural effusion noted on chest x-ray prior to discharge  History of bilateral pleural effusions requiring thoracentesis  · Ejection fraction 50%  · Monitor daily weight  · On chronic nasal cannula oxygen 2 L  No changes in oxygen requirements    Hyperkalemia:  · Resolved  · Potassium 4 8-5 1 on recent labs  · Increased dose of torsemide  Will check follow-up BMP in approximately 4-6 weeks    Atrial fibrillation:  · Atrial fibrillation with RVR during recent hospitalization  · Discharged on Lopressor 50 mg b i d   · On Eliquis  · Rhythm regular during visit  Rate controlled  Anemia:  · Iron levels on 03/09/2022:  Saturation 16%, ferritin 237  · S/p ferric gluconate during hospitalization earlier in March for acute diastolic CHF  · Hemoglobin improving 8 9-->9 6    Other:  · Status post left shoulder replacement  · Recent sepsis due to UTI  · Pulmonary nodules: There is a plan for CT follow-up in 3 months  · GERD:  On PPI and Mylanta  · Diabetes mellitus type 2:  A1c 6 8%  · Next hyperlipidemia:  On Crestor    PATIENT INSTRUCTIONS:    Patient Instructions   1  NO diet restriction for eliquis  2  Follow low salt diet  3  Increase torsemide to 20 mg alternating with 10 mg every other day  If weight decreases/swelling improves you can resume torsemide 10 mg a day with increased dose of 20 mg as needed  4  Follow-up Dr Angelita Shrestha in approximately 3 months  5  Follow-up BMP in 4-6 weeks  6   Blood work and urine studies prior to the appointment        Orders Placed This Encounter   Procedures    CBC     Standing Status:   Future     Standing Expiration Date:   9/1/2022    Renal function panel     Standing Status:   Future     Standing Expiration Date:   9/1/2022    Urinalysis with microscopic     Standing Status:   Future     Standing Expiration Date:   9/1/2022    Protein / creatinine ratio, urine     Standing Status:   Future     Standing Expiration Date:   9/1/2022    Magnesium     Standing Status:   Future     Standing Expiration Date:   9/1/2022    PTH, intact     Standing Status:   Future     Standing Expiration Date:   9/1/2022    Vitamin D 25 hydroxy     Standing Status:   Future     Standing Expiration Date:   9/1/2022    Basic metabolic panel     This is a patient instruction: Patient fasting for 8 hours or longer recommended  Standing Status:   Future     Standing Expiration Date:   6/18/2022       OBJECTIVE:  Current Weight: Weight - Scale: 121 kg (266 lb)  Vitals:    04/18/22 1522 04/18/22 1550   BP:  132/58   BP Location:  Right arm   Patient Position:  Sitting   Cuff Size:  Large   Pulse:  80   Weight: 121 kg (266 lb)    Height: 5' 2" (1 575 m)     Body mass index is 48 65 kg/m²  REVIEW OF SYSTEMS:    Review of Systems   Constitutional: Negative for activity change, appetite change, chills, fatigue, fever and unexpected weight change  HENT: Negative for congestion, ear pain and sore throat  Eyes: Negative for pain and visual disturbance  Respiratory: Negative for cough and shortness of breath  No unusual shortness of breath   Cardiovascular: Positive for leg swelling  Negative for chest pain and palpitations  Gastrointestinal: Negative for abdominal pain, constipation, diarrhea, nausea and vomiting  Genitourinary: Negative for dysuria and hematuria  Musculoskeletal: Positive for arthralgias  Negative for back pain     Skin: Negative for color change and rash    Neurological: Negative for dizziness, seizures, syncope and light-headedness  Psychiatric/Behavioral: The patient is not nervous/anxious  All other systems reviewed and are negative  PHYSICAL EXAM:      Physical Exam  Constitutional:       General: She is not in acute distress  Appearance: She is well-developed  She is not ill-appearing, toxic-appearing or diaphoretic  HENT:      Head: Normocephalic and atraumatic  Nose: Nose normal  No congestion  Eyes:      General: No scleral icterus  Right eye: No discharge  Left eye: No discharge  Extraocular Movements: Extraocular movements intact  Conjunctiva/sclera: Conjunctivae normal    Neck:      Vascular: No carotid bruit or JVD  Trachea: No tracheal deviation  Cardiovascular:      Rate and Rhythm: Normal rate and regular rhythm  Heart sounds: Murmur heard  Systolic murmur is present with a grade of 2/6  No friction rub  No gallop  Pulmonary:      Effort: Pulmonary effort is normal       Breath sounds: Normal breath sounds  Abdominal:      General: Bowel sounds are normal  There is no distension  Palpations: Abdomen is soft  There is no mass  Tenderness: There is no abdominal tenderness  There is no guarding or rebound  Musculoskeletal:         General: No tenderness, deformity or signs of injury  Normal range of motion  Cervical back: Normal range of motion and neck supple  No rigidity or tenderness  Right lower le+ Edema present  Left lower le+ Edema present  Skin:     General: Skin is warm and dry  Coloration: Skin is not jaundiced or pale  Findings: No erythema or rash  Neurological:      General: No focal deficit present  Mental Status: She is alert and oriented to person, place, and time  Psychiatric:         Behavior: Behavior normal          Thought Content:  Thought content normal          Judgment: Judgment normal  Medications:    Current Outpatient Medications:     acetaminophen (TYLENOL) 325 mg tablet, Take 650 mg by mouth every 6 (six) hours as needed for mild pain  , Disp: , Rfl:     albuterol (PROVENTIL HFA,VENTOLIN HFA) 90 mcg/act inhaler, Inhale 2 puffs every 6 (six) hours as needed for wheezing, Disp: 8 g, Rfl: 1    apixaban (ELIQUIS) 5 mg, Take 1 tablet (5 mg total) by mouth 2 (two) times a day, Disp: 60 tablet, Rfl: 0    b complex-vitamin C-folic acid (NEPHROCAPS) 1 mg capsule, Take 1 capsule by mouth daily, Disp: 30 capsule, Rfl: 0    docusate sodium (COLACE) 100 mg capsule, Take 100 mg by mouth in the morning, Disp: , Rfl:     montelukast (SINGULAIR) 10 mg tablet, Take 10 mg by mouth daily  , Disp: , Rfl:     pregabalin (LYRICA) 100 mg capsule, Take 100 mg by mouth 2 (two) times a day , Disp: , Rfl:     rosuvastatin (CRESTOR) 10 MG tablet, 10 mg daily  , Disp: , Rfl: 0    torsemide (DEMADEX) 10 mg tablet, Take 1 tablet (10 mg total) by mouth daily, Disp: 30 tablet, Rfl: 0    Toujeo SoloStar 300 units/mL CONCENTRATED U-300 injection pen (1-unit dial), Inject 25 Units under the skin daily at bedtime, Disp: 4 5 mL, Rfl: 0    Trulicity 1 5 HV/1 9WS SOPN, inject 0 5 milliliter subcutaneously every week 28, Disp: , Rfl:     ammonium lactate (LAC-HYDRIN) 12 % lotion, APPLY TOPICALLY TO AFFECTED AREAS twice a day, Disp: , Rfl:     glucose blood (OneTouch Ultra) test strip, Test twice daily  , Disp: 100 strip, Rfl: 2    Lancets (onetouch ultrasoft) lancets, Use once daily, Disp: 100 each, Rfl: 2    lidocaine (LIDODERM) 5 %, Apply 2 patches topically daily Remove & Discard patch within 12 hours or as directed by MD (Patient not taking: Reported on 4/8/2022 ), Disp: , Rfl: 0    metoprolol tartrate (LOPRESSOR) 50 mg tablet, Take 1 tablet (50 mg total) by mouth every 12 (twelve) hours (Patient not taking: Reported on 4/8/2022 ), Disp: 60 tablet, Rfl: 0    NOVOFINE AUTOCOVER 30G X 8 MM MISC, , Disp: , Rfl: 0    nystatin (MYCOSTATIN) powder, Apply topically 2 (two) times a day, Disp: , Rfl:     Laboratory Results:  Results from last 7 days   Lab Units 04/12/22  0922   WBC Thousand/uL 8 74   HEMOGLOBIN g/dL 9 6*   HEMATOCRIT % 33 2*   PLATELETS Thousands/uL 212   POTASSIUM mmol/L 5 1   CHLORIDE mmol/L 104   CO2 mmol/L 29   BUN mg/dL 24   CREATININE mg/dL 1 12   CALCIUM mg/dL 9 0       Results for orders placed or performed in visit on 04/12/22   Urine culture    Specimen: Urine   Result Value Ref Range    Urine Culture 50,000-59,000 cfu/ml

## 2022-04-15 ENCOUNTER — OFFICE VISIT (OUTPATIENT)
Dept: OBGYN CLINIC | Facility: CLINIC | Age: 72
End: 2022-04-15

## 2022-04-15 VITALS — SYSTOLIC BLOOD PRESSURE: 122 MMHG | DIASTOLIC BLOOD PRESSURE: 78 MMHG | TEMPERATURE: 98.4 F

## 2022-04-15 DIAGNOSIS — Z96.612 STATUS POST REVERSE TOTAL SHOULDER REPLACEMENT, LEFT: Primary | ICD-10-CM

## 2022-04-15 PROCEDURE — 99024 POSTOP FOLLOW-UP VISIT: CPT | Performed by: PHYSICIAN ASSISTANT

## 2022-04-15 NOTE — PROGRESS NOTES
Assessment/Plan:  1  Status post reverse total shoulder replacement, left       The patient is doing well with no signs of infection today  She can discontinue her sling at this point  She will continue home physical therapy  I did provide her with a copy of our reverse total shoulder protocol for the therapist today, as the patient notes they have not been working much on the shoulder and more the elbow and wrist   She may do simple activity with the arm but should avoid any strenuous activity  She will follow up in 4 weeks for repeat evaluation  Subjective:   Rashad Briggs is a 73 y o  female who presents today for follow-up of her left shoulder, now about 6 weeks status post reverse total shoulder replacement for fracture  She notes minimal pain about the shoulder  She has been removing her sling quite frequently while at home  She has been having home physical therapy  She notes good sensation of the left upper extremity        Review of Systems      Past Medical History:   Diagnosis Date    Anemia     Asthma     COVID-19 January 2022    GERD (gastroesophageal reflux disease)     Hyperlipidemia     Kidney stones     PONV (postoperative nausea and vomiting)     SVT (supraventricular tachycardia) (HCC)        Past Surgical History:   Procedure Laterality Date    APPENDECTOMY      CYSTOSCOPY W/ LASER LITHOTRIPSY Left 7/12/2016    Procedure: CYSTOSCOPY URETEROSCOPY WITH LITHOTRIPSY HOLMIUM LASER, RETROGRADE PYELOGRAM AND INSERTION STENT URETERAL;  Surgeon: Isabelle Alan MD;  Location: [address] [place] [address];  Service:     DILATION AND CURETTAGE OF UTERUS      IR THORACENTESIS  3/18/2022    JOINT REPLACEMENT      right knee    KNEE ARTHROPLASTY Right     [facility] CYSTOURETHROSCOPY,URETER CATHETER Left 6/29/2016    Procedure: CYSTOSCOPY RETROGRADE PYELOGRAM WITH INSERTION STENT URETERAL, left;  Surgeon: Isabelle Alan MD;  Location: [address] [place] [address];  Service: Urology    VA [address] [place] [address] IMPLANT Left 3/1/2022    Procedure: ARTHROPLASTY SHOULDER REVERSE;  Surgeon: Ketan Oden MD;  [facility];  Service: Orthopedics    SHOULDER ARTHROTOMY Left        Family History   Problem Relation Age of Onset    Heart disease Mother     Emphysema Maternal Grandmother     Heart disease Family        Social History     Occupational History    Not on file   Tobacco Use    Smoking status: Former Smoker     Packs/day: 1 00     Years: 20 00     Pack years: 20 00     Types: Cigarettes     Quit date: 1996     Years since quittin 7    Smokeless tobacco: Never Used   Vaping Use    Vaping Use: Never used   Substance and Sexual Activity    Alcohol use: Not Currently     Comment: rarely    Drug use: No    Sexual activity: Not Currently         Current Outpatient Medications:     acetaminophen (TYLENOL) 325 mg tablet, Take 650 mg by mouth every 6 (six) hours as needed for mild pain  , Disp: , Rfl:     albuterol (PROVENTIL HFA,VENTOLIN HFA) 90 mcg/act inhaler, Inhale 2 puffs every 6 (six) hours as needed for wheezing, Disp: 8 g, Rfl: 1    ammonium lactate (LAC-HYDRIN) 12 % lotion, APPLY TOPICALLY TO AFFECTED AREAS twice a day, Disp: , Rfl:     apixaban (ELIQUIS) 5 mg, Take 1 tablet (5 mg total) by mouth 2 (two) times a day, Disp: 60 tablet, Rfl: 0    b complex-vitamin C-folic acid (NEPHROCAPS) 1 mg capsule, Take 1 capsule by mouth daily, Disp: 30 capsule, Rfl: 0    docusate sodium (COLACE) 100 mg capsule, Take 100 mg by mouth in the morning, Disp: , Rfl:     glucose blood (OneTouch Ultra) test strip, Test twice daily  , Disp: 100 strip, Rfl: 2    Lancets (onetouch ultrasoft) lancets, Use once daily, Disp: 100 each, Rfl: 2    lidocaine (LIDODERM) 5 %, Apply 2 patches topically daily Remove & Discard patch within 12 hours or as directed by MD (Patient not taking: Reported on 2022 ), Disp: , Rfl: 0    metoprolol tartrate (LOPRESSOR) 50 mg tablet, Take 1 tablet (50 mg total) by mouth every 12 (twelve) hours (Patient not taking: Reported on 4/8/2022 ), Disp: 60 tablet, Rfl: 0    montelukast (SINGULAIR) 10 mg tablet, Take 10 mg by mouth daily  , Disp: , Rfl:     NOVOFINE AUTOCOVER 30G X 8 MM MISC, , Disp: , Rfl: 0    nystatin (MYCOSTATIN) powder, Apply topically 2 (two) times a day, Disp: , Rfl:     pregabalin (LYRICA) 100 mg capsule, Take 100 mg by mouth 2 (two) times a day , Disp: , Rfl:     rosuvastatin (CRESTOR) 10 MG tablet, 10 mg daily  , Disp: , Rfl: 0    torsemide (DEMADEX) 10 mg tablet, Take 1 tablet (10 mg total) by mouth daily, Disp: 30 tablet, Rfl: 0    Toujeo SoloStar 300 units/mL CONCENTRATED U-300 injection pen (1-unit dial), Inject 25 Units under the skin daily at bedtime, Disp: 4 5 mL, Rfl: 0    Trulicity 1 5 FM/6 0RI SOPN, inject 0 5 milliliter subcutaneously every week 28, Disp: , Rfl:     Allergies   Allergen Reactions    Penicillins Hives    Moxifloxacin Other (See Comments)     unknown    Percocet [Oxycodone-Acetaminophen] Other (See Comments)     unknown    Zinc Acetate Other (See Comments)     unknown    Asa [Aspirin] GI Intolerance    Indocin [Indomethacin] Other (See Comments)     Made patient "loopy"    Other Other (See Comments)     unknown       Objective:  Vitals:    04/15/22 1205   BP: 122/78   Temp: 98 4 °F (36 9 °C)       Ortho Exam    Physical Exam    Left shoulder:  Incision clean dry and intact  No tenderness  Near full range of motion the elbow  Patient cannot actively forward flex the shoulder yet  Diffuse limitations in passive range of motion  Sensation intact left upper extremity

## 2022-04-18 ENCOUNTER — OFFICE VISIT (OUTPATIENT)
Dept: NEPHROLOGY | Facility: CLINIC | Age: 72
End: 2022-04-18
Payer: MEDICARE

## 2022-04-18 VITALS
DIASTOLIC BLOOD PRESSURE: 58 MMHG | SYSTOLIC BLOOD PRESSURE: 132 MMHG | BODY MASS INDEX: 48.95 KG/M2 | WEIGHT: 266 LBS | HEART RATE: 80 BPM | HEIGHT: 62 IN

## 2022-04-18 DIAGNOSIS — I10 ESSENTIAL HYPERTENSION: ICD-10-CM

## 2022-04-18 DIAGNOSIS — Z96.651 S/P TOTAL KNEE REPLACEMENT, RIGHT: ICD-10-CM

## 2022-04-18 DIAGNOSIS — N18.31 STAGE 3A CHRONIC KIDNEY DISEASE (HCC): ICD-10-CM

## 2022-04-18 DIAGNOSIS — D64.9 ANEMIA, UNSPECIFIED TYPE: Primary | ICD-10-CM

## 2022-04-18 DIAGNOSIS — N17.9 AKI (ACUTE KIDNEY INJURY) (HCC): ICD-10-CM

## 2022-04-18 DIAGNOSIS — E11.00 TYPE 2 DIABETES MELLITUS WITH HYPEROSMOLARITY WITHOUT COMA, WITHOUT LONG-TERM CURRENT USE OF INSULIN (HCC): ICD-10-CM

## 2022-04-18 PROCEDURE — 99214 OFFICE O/P EST MOD 30 MIN: CPT | Performed by: NURSE PRACTITIONER

## 2022-04-18 NOTE — PATIENT INSTRUCTIONS
1  NO diet restriction for eliquis  2  Follow low salt diet  3  Increase torsemide to 20 mg alternating with 10 mg every other day  If weight decreases/swelling improves you can resume torsemide 10 mg a day with increased dose of 20 mg as needed  4  Follow-up Dr Natalie Sommers in approximately 3 months  5  Follow-up BMP in 4-6 weeks  6   Blood work and urine studies prior to the appointment

## 2022-04-21 ENCOUNTER — TELEPHONE (OUTPATIENT)
Dept: INPATIENT UNIT | Facility: HOSPITAL | Age: 72
End: 2022-04-21

## 2022-04-22 ENCOUNTER — TELEPHONE (OUTPATIENT)
Dept: OBGYN CLINIC | Facility: HOSPITAL | Age: 72
End: 2022-04-22

## 2022-04-22 ENCOUNTER — TELEMEDICINE (OUTPATIENT)
Dept: INTERNAL MEDICINE CLINIC | Facility: CLINIC | Age: 72
End: 2022-04-22
Payer: MEDICARE

## 2022-04-22 VITALS
WEIGHT: 262 LBS | SYSTOLIC BLOOD PRESSURE: 139 MMHG | HEIGHT: 62 IN | DIASTOLIC BLOOD PRESSURE: 69 MMHG | OXYGEN SATURATION: 98 % | BODY MASS INDEX: 48.21 KG/M2 | HEART RATE: 89 BPM

## 2022-04-22 DIAGNOSIS — E78.2 MIXED HYPERLIPIDEMIA: ICD-10-CM

## 2022-04-22 DIAGNOSIS — I48.91 ATRIAL FIBRILLATION WITH RAPID VENTRICULAR RESPONSE (HCC): ICD-10-CM

## 2022-04-22 DIAGNOSIS — I50.32 CHRONIC DIASTOLIC (CONGESTIVE) HEART FAILURE (HCC): ICD-10-CM

## 2022-04-22 DIAGNOSIS — K59.00 CONSTIPATION, UNSPECIFIED CONSTIPATION TYPE: ICD-10-CM

## 2022-04-22 DIAGNOSIS — R91.1 LUNG NODULE < 6CM ON CT: ICD-10-CM

## 2022-04-22 DIAGNOSIS — I50.32 CHRONIC DIASTOLIC CONGESTIVE HEART FAILURE (HCC): ICD-10-CM

## 2022-04-22 DIAGNOSIS — A41.9 SEPSIS, DUE TO UNSPECIFIED ORGANISM, UNSPECIFIED WHETHER ACUTE ORGAN DYSFUNCTION PRESENT (HCC): ICD-10-CM

## 2022-04-22 DIAGNOSIS — I10 ESSENTIAL HYPERTENSION: ICD-10-CM

## 2022-04-22 DIAGNOSIS — E11.00 TYPE 2 DIABETES MELLITUS WITH HYPEROSMOLARITY WITHOUT COMA, WITHOUT LONG-TERM CURRENT USE OF INSULIN (HCC): Primary | ICD-10-CM

## 2022-04-22 DIAGNOSIS — K21.9 GASTROESOPHAGEAL REFLUX DISEASE WITHOUT ESOPHAGITIS: ICD-10-CM

## 2022-04-22 DIAGNOSIS — N18.31 STAGE 3A CHRONIC KIDNEY DISEASE (HCC): ICD-10-CM

## 2022-04-22 DIAGNOSIS — I48.0 PAROXYSMAL ATRIAL FIBRILLATION (HCC): ICD-10-CM

## 2022-04-22 PROBLEM — E78.00 PURE HYPERCHOLESTEROLEMIA: Status: ACTIVE | Noted: 2022-04-22

## 2022-04-22 PROBLEM — E66.01 MORBID OBESITY (HCC): Status: ACTIVE | Noted: 2022-04-22

## 2022-04-22 PROCEDURE — 99213 OFFICE O/P EST LOW 20 MIN: CPT | Performed by: INTERNAL MEDICINE

## 2022-04-22 RX ORDER — INSULIN GLARGINE 300 U/ML
35 INJECTION, SOLUTION SUBCUTANEOUS
Qty: 4.5 ML | Refills: 4 | Status: ON HOLD
Start: 2022-04-22 | End: 2022-05-23 | Stop reason: SDUPTHER

## 2022-04-22 NOTE — ASSESSMENT & PLAN NOTE
Days anemia at the time of discharge was in the hemoglobin 8 g at this time 9 g  Patient is taking multivitamin  Not taking iron pill but will be eating high and diet  Follow-up CBC in 4 weeks to be awaited  Patient is tolerating anemia without side effect  No bleeding  Tolerating Eliquis without any problem

## 2022-04-22 NOTE — PROGRESS NOTES
Virtual Regular Visit    Verification of patient location:    Patient is located in the following state in which I hold an active license [place]      Assessment/Plan:    Problem List Items Addressed This Visit        Digestive    Gastroesophageal reflux disease       Endocrine    Diabetes mellitus, type 2 ([facility] ) - Primary       Lab Results   Component Value Date    HGBA1C 6 8 (H) 03/09/2022            Relevant Medications    Toujeo SoloStar 300 units/mL CONCENTRATED U-300 injection pen (1-unit dial)    Other Relevant Orders    CBC    Comprehensive metabolic panel       Cardiovascular and Mediastinum    Paroxysmal atrial fibrillation (HCC)    Essential hypertension    Relevant Orders    CBC    Comprehensive metabolic panel    Chronic diastolic congestive heart failure ([facility] )    Relevant Orders    CBC    Comprehensive metabolic panel    Atrial fibrillation with rapid ventricular response (HCC)    Relevant Orders    CBC    Comprehensive metabolic panel    Chronic diastolic (congestive) heart failure (HCC)    Relevant Orders    CBC    Comprehensive metabolic panel       Genitourinary    Stage 3 chronic kidney disease ([facility] )    Relevant Orders    CBC    Comprehensive metabolic panel               Reason for visit is   Chief Complaint   Patient presents with    Virtual Regular Visit    Hypertension    Hyperlipidemia    Diabetes    Congestive Heart Failure        Encounter provider Oswaldo Aden MD    Provider located at [address] [address] [phone]      Recent Visits  No visits were found meeting these conditions    Showing recent visits within past 7 days and meeting all other requirements  Today's Visits  Date Type Provider Dept   04/22/22 Telemedicine Oswaldo Aden MD [facility]   Showing today's visits and meeting all other requirements  Future Appointments  No visits were found meeting these conditions  Showing future appointments within next 150 days and meeting all other requirements       The patient was identified by name and date of birth  Mirna Chase was informed that this is a telemedicine visit and that the visit is being conducted through [address] [employer] and patient was informed that this is a secure, HIPAA-compliant platform  She agrees to proceed     My office door was closed  No one else was in the room  She acknowledged consent and understanding of privacy and security of the video platform  The patient has agreed to participate and understands they can discontinue the visit at any time  Patient is aware this is a billable service  Subjective  Mirna Chase is a 73 y o  female Chronic disease manage CHF, DM     Patient is here for multiple medical problems with multiple complexity  Paragraphs on CHF:  Patient denies any chest pain palpitation PND orthopnea  Peripheral edema issue noted  Torsemide was increased 10 mg alternating with 20 mg by nephrologist service  I will order CMP back in 3 weeks  UTI symptom-free  atrial fibrillation remains on metoprolol as well as Eliquis  Rate is controlled  Pay patient denies any chest pain palpitation PND orthopnea  There is no bleeding  Patient will continue to follow with cardiologist     Respiratory failure remains on oxygen  History of SVT denies any palpitation  CKD level 3 baseline creatinine 1 2-1 5 torsemide increased a elevated creatinine and BUN to be accepted to keep her dry  Of the palm nephrologist explanation noted  Paragraphs GERD symptom-free  How right shoulder replacement pain is better range of motion is better of surgery was done  Status post shoulder replacement on 03/01/2022    Pulmonary nodule CT scan report noted  Follow-up CT scan to be done in 3 months  Hyperlipidemia continue statin      Diabetes:  Patient used to be on Lantus 40 units twice a day in the past prior to any of this hospitalization  Recently patient was discharged on the 20 units due to hypoglycemia in hospital   Also metformin were discontinued due to acute renal failure  Patient's Lantus were increased to 25 units at last visit  Patient call yesterday sugar is running high in the range of 200-300  Around 200 in the morning and 250-300 in the evening  Lantus was increased to 35 units once a day yesterday  Will monitor the trend  Patient will let us know in couple of weeks  Will keep of morphine for right now  Continue Trulicity  Home patient probably has insulin resistance insulin resistant issues were discussed with the patient's  BMI in the range of 45-49 unchanged  Anemia no active bleeding discharge hemoglobin was in the range of 7 98 hemoglobin at this time in the range of 9 g tolerating anemia were fairly well  Of patient claims that all the skin issues have resolved completely at this time      Discharge Lab for 02/2022:  Potassium 5 7 BUN 41 creatinine 1 5 hemoglobin 7 9 WBC 10 75 urine culture grew 50-94100 colon is  Chest x-ray 04/01/2022 small right effusion  CT scan of the chest abdomen and pelvis:  329:  Decreased bilateral pleural effusion and pulmonary edema, passive atelectasis, multiple pulmonary nodule as described, follow-up CT scan in 3 months recommended, no hydronephrosis, no bowel obstruction, additional finding    Generally Larry Ferrari is doing very well  Denies any chest pain palpitation PND orthopnea  Edema is mild      04/12/2022:  Urine culture 50-50 59,000 colon is, hemoglobin 9 6 rest CBC normal, blood sugar 195 creatinine 1 12, GFR 50, blood sugar 195, urinalysis unremarkable  04/06/2022:  BUN 25 creatinine 1 25 blood sugar 173 albumin 2 8 GFR 43 hemoglobin 8 9 sed rest of the CMP normal  03/28/2022:  Hemoglobin 8 9, WBC 14 13 48, rest of the CBC normal, CMP normal except creatinine 1 86, BUN 31, GFR 27, blood sugar 358, urinalysis 1+ protein negative nitrite small leukocytes, lactic acid 1 4 proBNP 3576  03/09/2022:  Hemoglobin A1c 6 8 next  03/10/2022:  HDL 30 triglyceride 132 total cholesterol 104  03/08/2022:  Hemoglobin 8 8, GFR 44, creatinine 1 24 BUN 37 albumin 2 3  02/20/2022:  CMP normal except GFR 34 blood sugar 3 of 5 creatinine 1 52 BUN 39 CBC normal except WBC 11 9 hemoglobin 11 7            No results found for: Padmaja           Past Medical History:   Diagnosis Date    Anemia     Asthma     COVID-19 January 2022    GERD (gastroesophageal reflux disease)     Hyperlipidemia     Kidney stones     PONV (postoperative nausea and vomiting)     SVT (supraventricular tachycardia) (HCC)        Past Surgical History:   Procedure Laterality Date    APPENDECTOMY      CYSTOSCOPY W/ LASER LITHOTRIPSY Left 7/12/2016    Procedure: CYSTOSCOPY URETEROSCOPY WITH LITHOTRIPSY HOLMIUM LASER, RETROGRADE PYELOGRAM AND INSERTION STENT URETERAL;  Surgeon: Ashish Alexis MD;  Location: [address] [place] [address];  Service:     DILATION AND CURETTAGE OF UTERUS      IR THORACENTESIS  3/18/2022    JOINT REPLACEMENT      right knee    KNEE ARTHROPLASTY Right     TX CYSTOURETHROSCOPY,URETER CATHETER Left 6/29/2016    Procedure: CYSTOSCOPY RETROGRADE PYELOGRAM WITH INSERTION STENT URETERAL, left;  Surgeon: Ashish Alexis MD;  Location: [address] [place] [address];  Service: Urology    TX RECONSTR TOTAL SHOULDER IMPLANT Left 3/1/2022    Procedure: ARTHROPLASTY SHOULDER REVERSE;  Surgeon: Morales Nails MD;  Location: [address] [place] [address];  Service: Orthopedics    SHOULDER ARTHROTOMY Left        Current Outpatient Medications   Medication Sig Dispense Refill    acetaminophen (TYLENOL) 325 mg tablet Take 650 mg by mouth every 6 (six) hours as needed for mild pain        albuterol (PROVENTIL HFA,VENTOLIN HFA) 90 mcg/act inhaler Inhale 2 puffs every 6 (six) hours as needed for wheezing 8 g 1    ammonium lactate (LAC-HYDRIN) 12 % lotion APPLY TOPICALLY TO AFFECTED AREAS twice a day      apixaban (ELIQUIS) 5 mg Take 1 tablet (5 mg total) by mouth 2 (two) times a day 60 tablet 0    b complex-vitamin C-folic acid (NEPHROCAPS) 1 mg capsule Take 1 capsule by mouth daily 30 capsule 0    docusate sodium (COLACE) 100 mg capsule Take 100 mg by mouth in the morning      glucose blood (OneTouch Ultra) test strip Test twice daily  100 strip 2    Insulin Pen Needle (NovoFine ABS Medicalover) 30G X 8 MM MISC Inject under the skin daily 100 each 3    Lancets (onetouch ultrasoft) lancets Use once daily 100 each 2    lidocaine (LIDODERM) 5 % Apply 2 patches topically daily Remove & Discard patch within 12 hours or as directed by MD  0    montelukast (SINGULAIR) 10 mg tablet Take 10 mg by mouth daily        nystatin (MYCOSTATIN) powder Apply topically 2 (two) times a day      pregabalin (LYRICA) 100 mg capsule Take 100 mg by mouth 2 (two) times a day       rosuvastatin (CRESTOR) 10 MG tablet 10 mg daily    0    torsemide (DEMADEX) 10 mg tablet Take 1 tablet (10 mg total) by mouth daily 30 tablet 0    Toujeo SoloStar 300 units/mL CONCENTRATED U-300 injection pen (1-unit dial) Inject 35 Units under the skin daily at bedtime 4 5 mL 4    Trulicity 1 5 EO/7 1VI SOPN inject 0 5 milliliter subcutaneously every week 28      metoprolol tartrate (LOPRESSOR) 50 mg tablet Take 1 tablet (50 mg total) by mouth every 12 (twelve) hours (Patient not taking: Reported on 4/8/2022 ) 60 tablet 0     No current facility-administered medications for this visit  Allergies   Allergen Reactions    Penicillins Hives    Moxifloxacin Other (See Comments)     unknown    Percocet [Oxycodone-Acetaminophen] Other (See Comments)     unknown    Zinc Acetate Other (See Comments)     unknown    Asa [Aspirin] GI Intolerance    Indocin [Indomethacin] Other (See Comments)     Made patient "loopy"    Other Other (See Comments)     unknown       Review of Systems   All other systems reviewed and are negative        Video Exam    Vitals: 04/22/22 1618   BP: 139/69   Pulse: 89   SpO2: 98%   Weight: 119 kg (262 lb)   Height: 5' 2" (1 575 m)       Physical Exam  Constitutional:       General: She is not in acute distress  Appearance: She is obese  She is not ill-appearing, toxic-appearing or diaphoretic  Pulmonary:      Effort: No respiratory distress  Neurological:      Mental Status: She is oriented to person, place, and time  I spent 20 minutes directly with the patient during this visit    VIRTUAL VISIT [address] [facility] verbally agrees to participate in [employer]  Pt is aware that [employer] could be limited without vital signs or the ability to perform a full hands-on physical Aneeshdary Contreras understands she or the provider may request at any time to terminate the video visit and request the patient to seek care or treatment in person

## 2022-04-22 NOTE — ASSESSMENT & PLAN NOTE
Lab Results   Component Value Date    EGFR 50 04/12/2022    EGFR 43 04/06/2022    EGFR 35 04/02/2022    CREATININE 1 12 04/12/2022    CREATININE 1 25 04/06/2022    CREATININE 1 50 (H) 04/02/2022   Renal notes were noted  Torsemide is being increased 10 mg alternating with 20 mg on alternate day  Of initial creatinine range was 1 22-1 5  Of 1 5-2 0 will be except up to keep her dry  Renal notes were noted  Will monitor closely at this time  Patient is in much better mood

## 2022-04-22 NOTE — PATIENT INSTRUCTIONS
Follow with Consultants as per their and our suggestion    Follow up in one month or as needed earlier    Follow all instructions as advised and discussed  Take your medications as prescribed  Call the office immediately if you experience any side effects  Ask questions if you do not understand  Keep your scheduled appointment as advised or come sooner if necessary or in doubt  Best time to call for non-urgent matter or questions on weekdays is between 9am and 12 noon  See physician for any new symptoms or worsening of current symptoms  Urgent or emergent situations call 911 and report to nearest emergency room      I spent  30 -40 minutes taking care of this patient including clinical care, conseling, collaboration, chart, lab and consultaion review as appropriate

## 2022-04-22 NOTE — TELEPHONE ENCOUNTER
I called and advised that she does not need a new script for home therapy  Spoke to Dru from [facility]  She is still receiving home PT from them

## 2022-04-22 NOTE — ASSESSMENT & PLAN NOTE
Lab Results   Component Value Date    HGBA1C 6 8 (H) 03/09/2022   Patient call yesterday claiming that her sugar has been running high in the range of 200 in the morning and 250-300 in the evening  Apparently patient was on Lantus 40 units twice a day prior to all this surgeries  At the time of hospital discharge she was discharged on 20 units with I had increase to 25 units at last visit  Blood sugar remains high  Patient is also no longer on metformin because of her acute renal failure in hospital it was discontinued  Patient remains on lantus 35 units at this time as well as well as  Trulicity weekly  Paragraphs yesterday I advised her to increase Lantus 35 units  We also discussed about insulin resistant  Home and also bringing down sugar slowly  Patient is in agreement with plan  Will do 3 well back in 3 weeks    With lab and follow back in 4 weeks

## 2022-04-22 NOTE — TELEPHONE ENCOUNTER
Pt Sees Dr Annemarie Phillips    Pt states that she needs a script for in home physical therapy   Please fax to    [facility]#[phone]

## 2022-04-22 NOTE — ASSESSMENT & PLAN NOTE
Wt Readings from Last 3 Encounters:   04/22/22 119 kg (262 lb)   04/18/22 121 kg (266 lb)   04/08/22 122 kg (269 lb)     By symptom or CHF is compensated the except peripheral edema  Patient was seen by nephrologist   The notes were reviewed  Of the torsemide is increased 10 mg alternating with 20 mg  Elevated elevated BUN creatinine to be accepted to keep her dry and out of hospital   That explanations were reviewed  Will do follow-up CMP in 4 weeks  Home will see in 4 weeks    Home patient remains on other risk management for hypertension diabetes hyperlipidemia as outlined in other notes

## 2022-04-22 NOTE — ASSESSMENT & PLAN NOTE
Patient denies any palpitation  Paragraphs remains on metoprolol for the rate control as well as the Eliquis 5 mg twice a day    Relatively symptom-free from the cardiac standpoint

## 2022-04-28 ENCOUNTER — TELEPHONE (OUTPATIENT)
Dept: INPATIENT UNIT | Facility: HOSPITAL | Age: 72
End: 2022-04-28

## 2022-04-28 ENCOUNTER — OFFICE VISIT (OUTPATIENT)
Dept: CARDIOLOGY CLINIC | Facility: CLINIC | Age: 72
End: 2022-04-28
Payer: MEDICARE

## 2022-04-28 VITALS
HEART RATE: 94 BPM | SYSTOLIC BLOOD PRESSURE: 126 MMHG | DIASTOLIC BLOOD PRESSURE: 62 MMHG | OXYGEN SATURATION: 100 % | HEIGHT: 62 IN | TEMPERATURE: 97.5 F | BODY MASS INDEX: 49.13 KG/M2 | WEIGHT: 267 LBS

## 2022-04-28 DIAGNOSIS — I10 ESSENTIAL HYPERTENSION: ICD-10-CM

## 2022-04-28 DIAGNOSIS — I50.9 CHF (CONGESTIVE HEART FAILURE) (HCC): ICD-10-CM

## 2022-04-28 DIAGNOSIS — E11.9 DIABETES MELLITUS TYPE 2 IN NONOBESE (HCC): ICD-10-CM

## 2022-04-28 DIAGNOSIS — I48.0 PAROXYSMAL ATRIAL FIBRILLATION (HCC): Primary | ICD-10-CM

## 2022-04-28 PROCEDURE — 99213 OFFICE O/P EST LOW 20 MIN: CPT | Performed by: INTERNAL MEDICINE

## 2022-04-28 PROCEDURE — 93000 ELECTROCARDIOGRAM COMPLETE: CPT | Performed by: INTERNAL MEDICINE

## 2022-04-28 RX ORDER — AMLODIPINE BESYLATE 5 MG/1
5 TABLET ORAL DAILY
COMMUNITY
Start: 2022-04-27 | End: 2022-04-28

## 2022-04-28 NOTE — PATIENT INSTRUCTIONS
He will stop taking amlodipine starting tomorrow morning    We will start metoprolol 25 mg tomorrow evening

## 2022-04-28 NOTE — PROGRESS NOTES
Cardiology Follow Up  Mata Sanchez  1950  [id number]  [address]  [address] [address], [address] [facility]  [place] [id number] [phone]    1  Paroxysmal atrial fibrillation (HCC)  POCT ECG    apixaban (ELIQUIS) 5 mg    metoprolol tartrate (LOPRESSOR) 25 mg tablet   2  CHF (congestive heart failure) ([address] )  Ambulatory referral to Cardiology    POCT ECG    apixaban (ELIQUIS) 5 mg    metoprolol tartrate (LOPRESSOR) 25 mg tablet   3  Diabetes mellitus type 2 in nonobese (HCC)  POCT ECG    apixaban (ELIQUIS) 5 mg    metoprolol tartrate (LOPRESSOR) 25 mg tablet   4  Essential hypertension  POCT ECG      Discussion/Plan:  SVT paroxysmal/AFib- her beta-blocker was discontinued on discharge secondary to low blood pressure  She is feeling daily palpitations  She is unable to ambulate to the bathroom without having severe palpitations  We will discontinue her amlodipine and restart metoprolol 25 milligrams twice a day  Will have her wear a extended Holter monitor in 2 weeks  She will continue with her Eliquis 5 milligrams twice a day  She denies having major bleeding  Acute on chronic diastolic hf in the setting of chronic kidney disease- she is currently on torsemide 20 milligrams alternating with 10 milligrams  We were unable to put her on SGLT2 given her fluctuating kidney function and history of urinary tract infections  We will restart metoprolol and discontinue amlodipine    Mild sleep apnea- weight loss  Off her oral mandibular device  Diabetes mellitus with insulin dependence recent up titration of her insulin by her primary    Chronic kidney disease- followed by Nephrology  May need to accept a high GFR with diuresis      Lymphedema- compression sock knee high      Interval History:  79year-old with a history of SVT, abnormal sleep screen with recent hospital admission secondary to acute infection noted to have periodic atrial tachycardia  She reports having continued symptoms at night  She is pending a sleep study  She is currently on metoprolol 100 mg twice a day  She also taking eliquis 5mg twice a day  Her lower extremity edema is better    02/24/2022:  She has had an unfortunate mechanical fall  She denies having chest heaviness  She denies feeling dizziness or lightheadedness  She has chronic lower extremity swelling  04/20/2022:  Recent hospitalization post surgery with decompensated heart failure  She was noted to have elevated kidney function  She was noted to have continued lower extremity swelling  Her torsemide was up titrated  She still has some lower extremity swelling  She feels palpitations daily  She states having palpitations light activities  Here she is currently in sinus rhythm at 80 beats per minute      Patient Active Problem List   Diagnosis    Asthma    Morbid obesity with BMI of 45 0-49 9, adult ([facility])    Lower leg edema    Paroxysmal atrial fibrillation ([facility])    Mixed hyperlipidemia    Anemia    Essential hypertension    Humeral head fracture    PONV (postoperative nausea and vomiting)    [facility]STIAN [facility]) [facility]    Diabetes mellitus, type 2 ([facility])    Gastroesophageal reflux disease    Nephrolithiasis    Arthritis    S/P total knee replacement, right    On continuous oral anticoagulation - eliquis    Closed 4-part fracture of proximal humerus, left, initial encounter    Constipation    Shortness of breath    Status post reverse total replacement of left shoulder    Chronic diastolic congestive heart failure (HCC)    Acute respiratory failure with hypoxia ([facility])    Stage 3 chronic kidney disease ([facility])    Peripheral venous insufficiency    Post-herpetic polyneuropathy    Raynaud's disease    Lung nodule < 6cm on CT    Atrial fibrillation with rapid ventricular response (HCC)    Sepsis (HCC)    Chronic diastolic [facility]congestive) heart failure [facility]    Pulmonary nodules    Lump of left breast    Pressure injury of deep tissue of sacral region    Morbid obesity (HCC)    Pure hypercholesterolemia     Past Medical History:   Diagnosis Date    Anemia     Asthma     COVID-19     2022    GERD (gastroesophageal reflux disease)     Hyperlipidemia     Kidney stones     PONV (postoperative nausea and vomiting)     SVT (supraventricular tachycardia) (HCC)      Social History     Socioeconomic History    Marital status: Single     Spouse name: Not on file    Number of children: Not on file    Years of education: Not on file    Highest education level: Not on file   Occupational History    Not on file   Tobacco Use    Smoking status: Former Smoker     Packs/day:      Years:      Pack years:      Types: Cigarettes     Quit date: 1996     Years since quittin 8    Smokeless tobacco: Never Used   Vaping Use    Vaping Use: Never used   Substance and Sexual Activity    Alcohol use: Not Currently     Comment: rarely    Drug use: No    Sexual activity: Not Currently   Other Topics Concern    Not on file   Social History Narrative    Not on file     Social Determinants of Health     Financial Resource Strain: Not on file   Food Insecurity: No Food Insecurity    Worried About Running Out of Food in the Last Year: Never true    Wes of Food in the Last Year: Never true   Transportation Needs: No Transportation Needs    Lack of Transportation (Medical): No    Lack of Transportation (Non-Medical):  No   Physical Activity: Not on file   Stress: Not on file   Social Connections: Not on file   Intimate Partner Violence: Not on file   Housing Stability: Low Risk     Unable to Pay for Housing in the Last Year: No    Number of Places Lived in the Last Year: 2    Unstable Housing in the Last Year: No      Family History   Problem Relation Age of Onset    Heart disease Mother     Emphysema Maternal Grandmother     Heart disease Family      Past Surgical History:   Procedure Laterality Date    APPENDECTOMY      CYSTOSCOPY W/ LASER LITHOTRIPSY Left 7/12/2016    Procedure: CYSTOSCOPY URETEROSCOPY WITH LITHOTRIPSY HOLMIUM LASER, RETROGRADE PYELOGRAM AND INSERTION STENT URETERAL;  Surgeon: Maria Isabel Ardon MD;  Location: [address] [place] [address];  Service:     DILATION AND CURETTAGE OF UTERUS      IR THORACENTESIS  3/18/2022    JOINT REPLACEMENT      right knee    KNEE ARTHROPLASTY Right     MT CYSTOURETHROSCOPY,URETER CATHETER Left 6/29/2016    Procedure: CYSTOSCOPY RETROGRADE PYELOGRAM WITH INSERTION STENT URETERAL, left;  Surgeon: Maria Isabel Ardon MD;  Location: [address] [place] [address];  Service: Urology    MT RECONSTR TOTAL SHOULDER IMPLANT Left 3/1/2022    Procedure: ARTHROPLASTY SHOULDER REVERSE;  Surgeon: Félix Ramos MD;  [facility];  Service: Orthopedics    SHOULDER ARTHROTOMY Left        Current Outpatient Medications:     acetaminophen (TYLENOL) 325 mg tablet, Take 650 mg by mouth every 6 (six) hours as needed for mild pain  , Disp: , Rfl:     albuterol (PROVENTIL HFA,VENTOLIN HFA) 90 mcg/act inhaler, Inhale 2 puffs every 6 (six) hours as needed for wheezing, Disp: 8 g, Rfl: 1    ammonium lactate (LAC-HYDRIN) 12 % lotion, APPLY TOPICALLY TO AFFECTED AREAS twice a day, Disp: , Rfl:     apixaban (ELIQUIS) 5 mg, Take 1 tablet (5 mg total) by mouth 2 (two) times a day, Disp: 60 tablet, Rfl: 3    b complex-vitamin C-folic acid (NEPHROCAPS) 1 mg capsule, Take 1 capsule by mouth daily, Disp: 30 capsule, Rfl: 0    docusate sodium (COLACE) 100 mg capsule, Take 100 mg by mouth in the morning, Disp: , Rfl:     glucose blood (OneTouch Ultra) test strip, Test twice daily  , Disp: 100 strip, Rfl: 2    Insulin Pen Needle (NovoFine Autocover) 30G X 8 MM MISC, Inject under the skin daily, Disp: 100 each, Rfl: 3    Lancets (onetouch ultrasoft) lancets, Use once daily, Disp: 100 each, Rfl: 2    montelukast (SINGULAIR) 10 mg tablet, Take 10 mg by mouth daily  , Disp: , Rfl:     nystatin (MYCOSTATIN) powder, Apply topically 2 (two) times a day, Disp: , Rfl:     pregabalin (LYRICA) 100 mg capsule, Take 100 mg by mouth 2 (two) times a day , Disp: , Rfl:     rosuvastatin (CRESTOR) 10 MG tablet, 10 mg daily  , Disp: , Rfl: 0    torsemide (DEMADEX) 10 mg tablet, Take 1 tablet (10 mg total) by mouth daily, Disp: 30 tablet, Rfl: 0    Toujeo SoloStar 300 units/mL CONCENTRATED U-300 injection pen (1-unit dial), Inject 35 Units under the skin daily at bedtime, Disp: 4 5 mL, Rfl: 4    Trulicity 1 5 VH/2 7TH SOPN, inject 0 5 milliliter subcutaneously every week 28, Disp: , Rfl:     lidocaine (LIDODERM) 5 %, Apply 2 patches topically daily Remove & Discard patch within 12 hours or as directed by MD, Disp: , Rfl: 0    metoprolol tartrate (LOPRESSOR) 25 mg tablet, Take 1 tablet (25 mg total) by mouth every 12 (twelve) hours, Disp: 60 tablet, Rfl: 3  Allergies   Allergen Reactions    Penicillins Hives    Moxifloxacin Other (See Comments)     unknown    Percocet [Oxycodone-Acetaminophen] Other (See Comments)     unknown    Zinc Acetate Other (See Comments)     unknown    Asa [Aspirin] GI Intolerance    Indocin [Indomethacin] Other (See Comments)     Made patient "loopy"    Other Other (See Comments)     unknown       Review of Systems:  Review of Systems   Constitutional: Negative  Negative for activity change, appetite change, chills, diaphoresis, fatigue, fever and unexpected weight change  HENT: Negative  Negative for congestion, dental problem, drooling, ear discharge, ear pain, facial swelling, hearing loss, mouth sores, nosebleeds, postnasal drip, rhinorrhea, sinus pressure, sinus pain, sneezing, sore throat, tinnitus, trouble swallowing and voice change  Eyes: Negative  Negative for photophobia, pain, redness, itching and visual disturbance  Respiratory: Negative    Negative for apnea, cough, choking, chest tightness, shortness of breath, wheezing and stridor  Cardiovascular: Positive for palpitations and leg swelling  Negative for chest pain  Gastrointestinal: Negative  Negative for abdominal distention, abdominal pain, anal bleeding, blood in stool, constipation, diarrhea, nausea, rectal pain and vomiting  Endocrine: Negative  Negative for cold intolerance, heat intolerance, polydipsia, polyphagia and polyuria  Genitourinary: Negative  Negative for decreased urine volume, difficulty urinating, dyspareunia, dysuria, enuresis, flank pain, frequency, genital sores, hematuria, menstrual problem, pelvic pain, urgency, vaginal bleeding, vaginal discharge and vaginal pain  Musculoskeletal: Negative  Negative for arthralgias, back pain, gait problem, joint swelling, myalgias, neck pain and neck stiffness  Skin: Negative  Negative for color change, pallor, rash and wound  Allergic/Immunologic: Negative  Negative for environmental allergies, food allergies and immunocompromised state  Neurological: Negative  Negative for dizziness, tremors, seizures, syncope, facial asymmetry, speech difficulty, weakness, light-headedness, numbness and headaches  Hematological: Negative  Negative for adenopathy  Does not bruise/bleed easily  Psychiatric/Behavioral: Negative  Negative for agitation, behavioral problems, confusion, decreased concentration, dysphoric mood, hallucinations, self-injury, sleep disturbance and suicidal ideas  The patient is not nervous/anxious and is not hyperactive  All other systems reviewed and are negative  Vitals:    04/28/22 1536   BP: 126/62   BP Location: Right arm   Patient Position: Sitting   Cuff Size: Large   Pulse: 94   Temp: 97 5 °F (36 4 °C)   SpO2: 100%   Weight: 121 kg (267 lb)   Height: 5' 2" (1 575 m)     Physical Exam:  Physical Exam  Constitutional:       General: She is not in acute distress  Appearance: She is well-developed  She is not diaphoretic     HENT: Head: Normocephalic and atraumatic  Right Ear: External ear normal       Left Ear: External ear normal    Eyes:      General: No scleral icterus  Right eye: No discharge  Left eye: No discharge  Conjunctiva/sclera: Conjunctivae normal       Pupils: Pupils are equal, round, and reactive to light  Neck:      Thyroid: No thyromegaly  Vascular: No JVD  Trachea: No tracheal deviation  Cardiovascular:      Rate and Rhythm: Normal rate and regular rhythm  Heart sounds: No murmur heard  No friction rub  Gallop present  Pulmonary:      Effort: Pulmonary effort is normal  No respiratory distress  Breath sounds: Normal breath sounds  No stridor  No wheezing or rales  Chest:      Chest wall: No tenderness  Abdominal:      General: Bowel sounds are normal  There is distension  Palpations: Abdomen is soft  There is no mass  Tenderness: There is no abdominal tenderness  There is no guarding or rebound  Musculoskeletal:         General: Swelling, deformity and signs of injury present  No tenderness  Normal range of motion  Cervical back: Normal range of motion and neck supple  Skin:     General: Skin is warm and dry  Coloration: Skin is not pale  Findings: No erythema or rash  Neurological:      Mental Status: She is alert and oriented to person, place, and time  Cranial Nerves: No cranial nerve deficit  Motor: No abnormal muscle tone  Coordination: Coordination normal       Deep Tendon Reflexes: Reflexes are normal and symmetric  Reflexes normal    Psychiatric:         Behavior: Behavior normal          Thought Content:  Thought content normal          Judgment: Judgment normal          Labs:     Lab Results   Component Value Date    WBC 8 74 04/12/2022    HGB 9 6 (L) 04/12/2022    HCT 33 2 (L) 04/12/2022    MCV 97 04/12/2022     04/12/2022     Lab Results   Component Value Date    K 5 1 04/12/2022     04/12/2022 CO2 29 2022    BUN 24 2022    CREATININE 1 12 2022    GLUF 128 (H) 2022    CALCIUM 9 0 2022    CORRECTEDCA 9 8 2022    AST 25 2022    ALT 18 2022    ALKPHOS 76 2022    EGFR 50 2022     No results found for: CHOL  Lab Results   Component Value Date    HDL 30 (L) 03/10/2022     Lab Results   Component Value Date    LDLCALC 48 03/10/2022     Lab Results   Component Value Date    TRIG 132 03/10/2022     Lab Results   Component Value Date    HGBA1C 6 8 (H) 2022       Imaging & Testing   I have personally reviewed pertinent reports  EKG: Personally reviewed  Normal sinus rhythm poor R-wave progression no acute ST changes unchanged from prior  Cardiac testing:   Results for orders placed during the hospital encounter of 19   Echo complete with contrast if indicated    Narrative [address]  [address] [facility]   [phone]    Transthoracic Echocardiogram  2D, M-mode, Doppler, and Color Doppler    Study date:  29-May-2019    Patient: Mala Ponce  MR number: [id number]  Account number: [de-identified]  : 1950  Age: 79 years  Gender: Female  Status: Inpatient  Location: Bedside  Height: 62 in  Weight: 250 6 lb  BP: 133/ 62 mmHg    Indications: Tachycardia    Diagnoses: I11 9 - Hypertensive heart disease without heart failure    Sonographer:  QUETA Marshall  Referring Physician:  Magno Denise MD  Group:  [facility]  Interpreting Physician:  Clara Stephens DO    SUMMARY    LEFT VENTRICLE:  Systolic function was normal by visual assessment  Ejection fraction was estimated to be 55 %  There were no regional wall motion abnormalities  Wall thickness was mildly to moderately increased  Doppler parameters were consistent with abnormal left ventricular relaxation (grade 1 diastolic dysfunction)  MITRAL VALVE:  There was mild regurgitation      AORTIC VALVE:  There was no evidence for stenosis  There was no regurgitation  TRICUSPID VALVE:  There was mild regurgitation  Pulmonary artery systolic pressure was within the normal range  HISTORY: PRIOR HISTORY: Diabetes,Diabetes, HTN, Hyperlipidemia, A Fib  PROCEDURE: The procedure was performed at the bedside  This was a routine study  The transthoracic approach was used  The study included complete 2D imaging, M-mode, complete spectral Doppler, and color Doppler  The heart rate was 77 bpm,  at the start of the study  Images were obtained from the parasternal, apical, subcostal, and suprasternal notch acoustic windows  Echocardiographic views were limited due to restricted patient mobility, poor patient compliance, poor  acoustic window availability, decreased penetration, and lung interference  This was a technically difficult study  LEFT VENTRICLE: Size was normal  Systolic function was normal by visual assessment  Ejection fraction was estimated to be 55 %  There were no regional wall motion abnormalities  Wall thickness was mildly to moderately increased  DOPPLER:  Doppler parameters were consistent with abnormal left ventricular relaxation (grade 1 diastolic dysfunction)  RIGHT VENTRICLE: The size was normal  Systolic function was normal     LEFT ATRIUM: The atrium was mildly dilated  RIGHT ATRIUM: Size was normal     MITRAL VALVE: Valve structure was normal  There was normal leaflet separation  No echocardiographic evidence for prolapse  DOPPLER: The transmitral velocity was within the normal range  There was no evidence for stenosis  There was mild  regurgitation  AORTIC VALVE: The valve was trileaflet  Leaflets exhibited normal thickness, normal cuspal separation, and sclerosis  DOPPLER: Transaortic velocity was within the normal range  There was no evidence for stenosis  There was no  regurgitation  TRICUSPID VALVE: The valve structure was normal  There was normal leaflet separation   DOPPLER: The transtricuspid velocity was within the normal range  There was mild regurgitation  Pulmonary artery systolic pressure was within the normal  range  Estimated peak PA pressure was 32 mmHg  PULMONIC VALVE: Leaflets exhibited normal thickness, no calcification, and normal cuspal separation  DOPPLER: The transpulmonic velocity was within the normal range  There was no regurgitation  PERICARDIUM: There was no thickening  There was no pericardial effusion  AORTA: The root exhibited normal size  PULMONARY ARTERY: The size was normal  The morphology appeared normal     SYSTEM MEASUREMENT TABLES    2D mode  AoR Diam 2D: 3 6 cm  LA Diam (2D): 3 9 cm  LA/Ao (2D): 1 08  FS (2D Teich): 26 9 %  IVSd (2D): 1 29 cm  LVDEV: 75 1 cmï¾³  LVESV: 35 3 cmï¾³  LVIDd(2D): 4 12 cm  LVISd (2D): 3 01 cm  LVOT Area 2D: 3 14 cmï¾²  LVPWd (2D): 1 2 cm  SV (Teich): 39 8 cmï¾³    Apical four chamber  LVEF A4C: 51 %    Unspecified Scan Mode  HANNA Cont Eq (Peak Gutierrez): 2 58 cmï¾²  LVOT Diam : 2 cm  LVOT Vmax: 963 mm/s  LVOT Vmax; Mean: 963 mm/s  Peak Grad ; Mean: 4 mm[Hg]  MV Peak A Gutierrez: 893 mm/s  MV Peak E Gutierrez  Mean: 805 mm/s  MVA (PHT): 3 67 cmï¾²  PHT: 60 ms  Max P mm[Hg]  V Max: 2360 mm/s  Vmax: 2430 mm/s  RA Area: 12 8 cmï¾²  RA Volume: 27 6 cmï¾³  TAPSE: 2 cm    [employer] Accredited Echocardiography [facility]    Prepared and electronically signed by    Jocelyn Palomino DO  Signed 29-May-2019 16:19:07       Sinus rhythm with APCs no acute ST changes  2022 normal sinus rhythm with poor R-wave progression    Cyndee Mccabe  Please call with any questions or suggestions    A description of the counseling:   Goals and Barriers:  Patient's ability to self care:  Medication side effect reviewed with patient in detail and all their questions answered  "This note has been constructed using a voice recognition system  Therefore there may be syntax, spelling, and/or grammatical errors   Please call if you have any questions  "

## 2022-04-28 NOTE — TELEPHONE ENCOUNTER
CHF Evaluation/Screening Form    Patient Level Data  Encounter Level Data  Knowledge Assessment/Post-Discharge Questions  Daily Weights Done?: Yes  Date of last weight: 4/28/22  Weight recorded: 46  Knows name of medication/water pill?: Yes  Understands Activity/Exercise:  Yes  Knows when to report symptoms?: Yes  Plan in place to call for worsening symptoms?: Yes  Does the patient have a means of transporation?: Yes

## 2022-04-29 ENCOUNTER — TELEPHONE (OUTPATIENT)
Dept: CARDIOLOGY CLINIC | Facility: CLINIC | Age: 72
End: 2022-04-29

## 2022-04-29 DIAGNOSIS — I50.9 CHF (CONGESTIVE HEART FAILURE) (HCC): ICD-10-CM

## 2022-04-29 DIAGNOSIS — E11.9 DIABETES MELLITUS TYPE 2 IN NONOBESE (HCC): ICD-10-CM

## 2022-04-29 DIAGNOSIS — I48.0 PAROXYSMAL ATRIAL FIBRILLATION (HCC): ICD-10-CM

## 2022-05-03 ENCOUNTER — TELEPHONE (OUTPATIENT)
Dept: CARDIOLOGY CLINIC | Facility: CLINIC | Age: 72
End: 2022-05-03

## 2022-05-03 ENCOUNTER — PATIENT MESSAGE (OUTPATIENT)
Dept: INTERNAL MEDICINE CLINIC | Facility: CLINIC | Age: 72
End: 2022-05-03

## 2022-05-03 DIAGNOSIS — I50.9 CHF (CONGESTIVE HEART FAILURE) (HCC): ICD-10-CM

## 2022-05-03 DIAGNOSIS — D64.9 ANEMIA, UNSPECIFIED TYPE: ICD-10-CM

## 2022-05-03 DIAGNOSIS — I48.0 PAROXYSMAL ATRIAL FIBRILLATION (HCC): ICD-10-CM

## 2022-05-03 DIAGNOSIS — E11.9 DIABETES MELLITUS TYPE 2 IN NONOBESE (HCC): ICD-10-CM

## 2022-05-03 RX ORDER — CHOLECALCIFEROL (VITAMIN D3) 10 MCG
1 TABLET ORAL DAILY
Qty: 30 CAPSULE | Refills: 5 | Status: SHIPPED | OUTPATIENT
Start: 2022-05-03 | End: 2022-06-14 | Stop reason: SDUPTHER

## 2022-05-03 NOTE — TELEPHONE ENCOUNTER
Requesting refill of Metoprolol Tart 25 mg sent to [facility] in [place]

## 2022-05-03 NOTE — PROGRESS NOTES
Outreach to patient  Introduced CM role in managing her care  Explained that I was Maritza's replacement  Pt reports all is going well  Niece takes her to her appointments  Pt is taking medications as directed  Continues with in home therapy and skilled nursing services by [employer] [facility]  No additional questions or concerns at this time

## 2022-05-12 ENCOUNTER — CLINICAL SUPPORT (OUTPATIENT)
Dept: CARDIOLOGY CLINIC | Facility: CLINIC | Age: 72
End: 2022-05-12
Payer: MEDICARE

## 2022-05-12 DIAGNOSIS — E11.9 DIABETES MELLITUS TYPE 2 IN NONOBESE (HCC): ICD-10-CM

## 2022-05-12 DIAGNOSIS — I48.0 PAROXYSMAL ATRIAL FIBRILLATION (HCC): ICD-10-CM

## 2022-05-12 DIAGNOSIS — I10 ESSENTIAL HYPERTENSION: ICD-10-CM

## 2022-05-12 PROCEDURE — 99285 EMERGENCY DEPT VISIT HI MDM: CPT | Performed by: EMERGENCY MEDICINE

## 2022-05-12 PROCEDURE — 93244 EXT ECG>48HR<7D REV&INTERPJ: CPT | Performed by: INTERNAL MEDICINE

## 2022-05-12 PROCEDURE — 99285 EMERGENCY DEPT VISIT HI MDM: CPT

## 2022-05-13 ENCOUNTER — HOSPITAL ENCOUNTER (EMERGENCY)
Facility: HOSPITAL | Age: 72
Discharge: HOME | DRG: 291 | End: 2022-05-13
Attending: EMERGENCY MEDICINE
Payer: MEDICARE

## 2022-05-13 ENCOUNTER — APPOINTMENT (EMERGENCY)
Dept: RADIOLOGY | Facility: HOSPITAL | Age: 72
DRG: 291 | End: 2022-05-13
Payer: MEDICARE

## 2022-05-13 VITALS
HEART RATE: 71 BPM | RESPIRATION RATE: 20 BRPM | BODY MASS INDEX: 47.02 KG/M2 | SYSTOLIC BLOOD PRESSURE: 146 MMHG | DIASTOLIC BLOOD PRESSURE: 65 MMHG | OXYGEN SATURATION: 99 % | TEMPERATURE: 98.2 F | HEIGHT: 62 IN | WEIGHT: 255.51 LBS

## 2022-05-13 DIAGNOSIS — N17.9 AKI (ACUTE KIDNEY INJURY) (HCC): ICD-10-CM

## 2022-05-13 DIAGNOSIS — N39.0 UTI (URINARY TRACT INFECTION): ICD-10-CM

## 2022-05-13 DIAGNOSIS — R00.2 PALPITATIONS: Primary | ICD-10-CM

## 2022-05-13 LAB
2HR DELTA HS TROPONIN: 1 NG/L
ALBUMIN SERPL BCP-MCNC: 3.1 G/DL (ref 3.5–5)
ALP SERPL-CCNC: 91 U/L (ref 46–116)
ALT SERPL W P-5'-P-CCNC: 15 U/L (ref 12–78)
ANION GAP SERPL CALCULATED.3IONS-SCNC: 5 MMOL/L (ref 4–13)
APTT PPP: 33 SECONDS (ref 23–37)
AST SERPL W P-5'-P-CCNC: 10 U/L (ref 5–45)
ATRIAL RATE: 85 BPM
BACTERIA UR QL AUTO: ABNORMAL /HPF
BASOPHILS # BLD AUTO: 0.06 THOUSANDS/ΜL (ref 0–0.1)
BASOPHILS NFR BLD AUTO: 1 % (ref 0–1)
BILIRUB SERPL-MCNC: 0.19 MG/DL (ref 0.2–1)
BILIRUB UR QL STRIP: NEGATIVE
BUN SERPL-MCNC: 36 MG/DL (ref 5–25)
CALCIUM ALBUM COR SERPL-MCNC: 8.7 MG/DL (ref 8.3–10.1)
CALCIUM SERPL-MCNC: 8 MG/DL (ref 8.3–10.1)
CARDIAC TROPONIN I PNL SERPL HS: 10 NG/L
CARDIAC TROPONIN I PNL SERPL HS: 9 NG/L
CHLORIDE SERPL-SCNC: 99 MMOL/L (ref 100–108)
CLARITY UR: CLEAR
CO2 SERPL-SCNC: 37 MMOL/L (ref 21–32)
COLOR UR: YELLOW
CREAT SERPL-MCNC: 1.49 MG/DL (ref 0.6–1.3)
EOSINOPHIL # BLD AUTO: 0.38 THOUSAND/ΜL (ref 0–0.61)
EOSINOPHIL NFR BLD AUTO: 5 % (ref 0–6)
ERYTHROCYTE [DISTWIDTH] IN BLOOD BY AUTOMATED COUNT: 14.3 % (ref 11.6–15.1)
GFR SERPL CREATININE-BSD FRML MDRD: 35 ML/MIN/1.73SQ M
GLUCOSE SERPL-MCNC: 282 MG/DL (ref 65–140)
GLUCOSE UR STRIP-MCNC: NEGATIVE MG/DL
HCT VFR BLD AUTO: 31.4 % (ref 34.8–46.1)
HGB BLD-MCNC: 9.2 G/DL (ref 11.5–15.4)
HGB UR QL STRIP.AUTO: ABNORMAL
IMM GRANULOCYTES # BLD AUTO: 0.03 THOUSAND/UL (ref 0–0.2)
IMM GRANULOCYTES NFR BLD AUTO: 0 % (ref 0–2)
INR PPP: 1.18 (ref 0.84–1.19)
KETONES UR STRIP-MCNC: NEGATIVE MG/DL
LEUKOCYTE ESTERASE UR QL STRIP: ABNORMAL
LYMPHOCYTES # BLD AUTO: 2.12 THOUSANDS/ΜL (ref 0.6–4.47)
LYMPHOCYTES NFR BLD AUTO: 26 % (ref 14–44)
MAGNESIUM SERPL-MCNC: 1.8 MG/DL (ref 1.6–2.6)
MCH RBC QN AUTO: 27.9 PG (ref 26.8–34.3)
MCHC RBC AUTO-ENTMCNC: 29.3 G/DL (ref 31.4–37.4)
MCV RBC AUTO: 95 FL (ref 82–98)
MONOCYTES # BLD AUTO: 0.84 THOUSAND/ΜL (ref 0.17–1.22)
MONOCYTES NFR BLD AUTO: 10 % (ref 4–12)
MUCOUS THREADS UR QL AUTO: ABNORMAL
NEUTROPHILS # BLD AUTO: 4.87 THOUSANDS/ΜL (ref 1.85–7.62)
NEUTS SEG NFR BLD AUTO: 58 % (ref 43–75)
NITRITE UR QL STRIP: NEGATIVE
NON-SQ EPI CELLS URNS QL MICRO: ABNORMAL /HPF
NRBC BLD AUTO-RTO: 0 /100 WBCS
NT-PROBNP SERPL-MCNC: 1548 PG/ML
P AXIS: 56 DEGREES
PH UR STRIP.AUTO: 5.5 [PH]
PLATELET # BLD AUTO: 174 THOUSANDS/UL (ref 149–390)
PMV BLD AUTO: 10.5 FL (ref 8.9–12.7)
POTASSIUM SERPL-SCNC: 4.4 MMOL/L (ref 3.5–5.3)
PR INTERVAL: 186 MS
PROT SERPL-MCNC: 7.4 G/DL (ref 6.4–8.2)
PROT UR STRIP-MCNC: ABNORMAL MG/DL
PROTHROMBIN TIME: 14.8 SECONDS (ref 11.6–14.5)
QRS AXIS: 8 DEGREES
QRSD INTERVAL: 72 MS
QT INTERVAL: 372 MS
QTC INTERVAL: 442 MS
RBC # BLD AUTO: 3.3 MILLION/UL (ref 3.81–5.12)
RBC #/AREA URNS AUTO: ABNORMAL /HPF
SODIUM SERPL-SCNC: 141 MMOL/L (ref 136–145)
SP GR UR STRIP.AUTO: 1.02 (ref 1–1.03)
T WAVE AXIS: 27 DEGREES
UROBILINOGEN UR QL STRIP.AUTO: 0.2 E.U./DL
VENTRICULAR RATE: 85 BPM
WBC # BLD AUTO: 8.3 THOUSAND/UL (ref 4.31–10.16)
WBC #/AREA URNS AUTO: ABNORMAL /HPF

## 2022-05-13 PROCEDURE — 85730 THROMBOPLASTIN TIME PARTIAL: CPT | Performed by: EMERGENCY MEDICINE

## 2022-05-13 PROCEDURE — 93005 ELECTROCARDIOGRAM TRACING: CPT

## 2022-05-13 PROCEDURE — 83735 ASSAY OF MAGNESIUM: CPT | Performed by: EMERGENCY MEDICINE

## 2022-05-13 PROCEDURE — 84484 ASSAY OF TROPONIN QUANT: CPT | Performed by: EMERGENCY MEDICINE

## 2022-05-13 PROCEDURE — 93010 ELECTROCARDIOGRAM REPORT: CPT | Performed by: INTERNAL MEDICINE

## 2022-05-13 PROCEDURE — 71045 X-RAY EXAM CHEST 1 VIEW: CPT

## 2022-05-13 PROCEDURE — 36415 COLL VENOUS BLD VENIPUNCTURE: CPT | Performed by: EMERGENCY MEDICINE

## 2022-05-13 PROCEDURE — 87086 URINE CULTURE/COLONY COUNT: CPT | Performed by: EMERGENCY MEDICINE

## 2022-05-13 PROCEDURE — 81001 URINALYSIS AUTO W/SCOPE: CPT | Performed by: EMERGENCY MEDICINE

## 2022-05-13 PROCEDURE — 80053 COMPREHEN METABOLIC PANEL: CPT | Performed by: EMERGENCY MEDICINE

## 2022-05-13 PROCEDURE — 85610 PROTHROMBIN TIME: CPT | Performed by: EMERGENCY MEDICINE

## 2022-05-13 PROCEDURE — 85025 COMPLETE CBC W/AUTO DIFF WBC: CPT | Performed by: EMERGENCY MEDICINE

## 2022-05-13 PROCEDURE — 83880 ASSAY OF NATRIURETIC PEPTIDE: CPT | Performed by: EMERGENCY MEDICINE

## 2022-05-13 RX ORDER — SULFAMETHOXAZOLE AND TRIMETHOPRIM 800; 160 MG/1; MG/1
1 TABLET ORAL 2 TIMES DAILY
Qty: 14 TABLET | Refills: 0 | Status: SHIPPED | OUTPATIENT
Start: 2022-05-13 | End: 2022-05-23

## 2022-05-13 RX ORDER — SULFAMETHOXAZOLE AND TRIMETHOPRIM 800; 160 MG/1; MG/1
1 TABLET ORAL ONCE
Status: COMPLETED | OUTPATIENT
Start: 2022-05-13 | End: 2022-05-13

## 2022-05-13 RX ADMIN — SULFAMETHOXAZOLE AND TRIMETHOPRIM 1 TABLET: 800; 160 TABLET ORAL at 03:13

## 2022-05-13 NOTE — ED NOTES
Received pt from previous RN who stated pt came in for rapid heart rate  HR now controlled   Pt reports no cp     Annabel Singh, RN  05/13/22 [id number] [facility], RN  05/13/22 3091

## 2022-05-13 NOTE — ED PROCEDURE NOTE
PROCEDURE  ECG 12 Lead Documentation Only    Date/Time: 5/13/2022 12:29 AM  Performed by: Oralia Escamilla DO  Authorized by: Oralia Escamilla DO     ECG reviewed by me, the ED Provider: yes    Patient location:  ED  Interpretation:     Interpretation: abnormal    Rate:     ECG rate:  85    ECG rate assessment: normal    Rhythm:     Rhythm: sinus rhythm    Ectopy:     Ectopy: none    Conduction:     Conduction: normal    ST segments:     ST segments:  Normal  T waves:     T waves: normal           Oralia Escamilla DO  05/13/22 0030

## 2022-05-13 NOTE — ED PROVIDER NOTES
History  Chief Complaint   Patient presents with    Shortness of Breath    Rapid Heart Rate     Patient presents for evaluation of shortness breath and rapid heart rate  She felt a rapid irregular heartbeat similar to AFib in the past   Patient does report feeling better at time of exam   She is not experiencing any chest pain or shortness of breath this time  Normally wears oxygen via nasal cannula at home  History provided by:  Patient   used: No        Prior to Admission Medications   Prescriptions Last Dose Informant Patient Reported? Taking? Insulin Pen Needle (NovoFine Autocover) 30G X 8 MM MISC  Self No No   Sig: Inject under the skin daily   Lancets (onetouch ultrasoft) lancets  Self No No   Sig: Use once daily   Toujeo SoloStar 300 units/mL CONCENTRATED U-300 injection pen (1-unit dial)  Self No No   Sig: Inject 35 Units under the skin daily at bedtime   Trulicity 1 5 OJ/5 1LJ SOPN  Self Yes No   Sig: inject 0 5 milliliter subcutaneously every week 28   acetaminophen (TYLENOL) 325 mg tablet  Self Yes No   Sig: Take 650 mg by mouth every 6 (six) hours as needed for mild pain     albuterol (PROVENTIL HFA,VENTOLIN HFA) 90 mcg/act inhaler  Self No No   Sig: Inhale 2 puffs every 6 (six) hours as needed for wheezing   ammonium lactate (LAC-HYDRIN) 12 % lotion  Self Yes No   Sig: APPLY TOPICALLY TO AFFECTED AREAS twice a day   apixaban (ELIQUIS) 5 mg   No No   Sig: Take 1 tablet (5 mg total) by mouth 2 (two) times a day   b complex-vitamin C-folic acid (NEPHROCAPS) 1 mg capsule   No No   Sig: Take 1 capsule by mouth daily   docusate sodium (COLACE) 100 mg capsule  Self Yes No   Sig: Take 100 mg by mouth in the morning   glucose blood (OneTouch Ultra) test strip  Self No No   Sig: Test twice daily     lidocaine (LIDODERM) 5 %  Self No No   Sig: Apply 2 patches topically daily Remove & Discard patch within 12 hours or as directed by MD   metoprolol tartrate (LOPRESSOR) 25 mg tablet   No No   Sig: Take 1 tablet (25 mg total) by mouth every 12 (twelve) hours   montelukast (SINGULAIR) 10 mg tablet  Self Yes No   Sig: Take 10 mg by mouth daily     nystatin (MYCOSTATIN) powder  Self Yes No   Sig: Apply topically 2 (two) times a day   pregabalin (LYRICA) 100 mg capsule  Self Yes No   Sig: Take 100 mg by mouth 2 (two) times a day    rosuvastatin (CRESTOR) 10 MG tablet  Self Yes No   Sig: 10 mg daily     torsemide (DEMADEX) 10 mg tablet  Self No No   Sig: Take 1 tablet (10 mg total) by mouth daily      Facility-Administered Medications: None       Past Medical History:   Diagnosis Date    Anemia     Asthma     COVID-19 January 2022    GERD (gastroesophageal reflux disease)     Hyperlipidemia     Kidney stones     PONV (postoperative nausea and vomiting)     SVT [facility]supra[facility]ricular tachycardia) ([facility])        Past Surgical History:   Procedure Laterality Date    APPENDECTOMY      CYSTOSCOPY W/ LASER LITHOTRIPSY Left 7/12/2016    Procedure: CYSTOSCOPY URETEROSCOPY WITH LITHOTRIPSY HOLMIUM LASER, RETROGRADE PYELOGRAM AND INSERTION STENT URETERAL;  Surgeon: Jerson Watts MD;  Location: [address] [place] [address];  Service:     DILATION AND CURETTAGE OF UTERUS      IR THORACENTESIS  3/18/2022    JOINT REPLACEMENT      right knee    KNEE ARTHROPLASTY Right     MO CYSTOURETHROSCOPY,URETER CATHETER Left 6/29/2016    Procedure: CYSTOSCOPY RETROGRADE PYELOGRAM WITH INSERTION STENT URETERAL, left;  Surgeon: Jerson Watts MD;  Location: [facility];  Service: Urology    MO RECONSTR TOTAL SHOULDER IMPLANT Left 3/1/2022    Procedure: ARTHROPLASTY SHOULDER REVERSE;  Surgeon: Clayton Bowen MD;  Location: [facility];  Service: Orthopedics    SHOULDER ARTHROTOMY Left        Family History   Problem Relation Age of Onset    Heart disease Mother     Emphysema Maternal Grandmother     Heart disease Family      I have reviewed and agree with the history as documented      E-Cigarette/Vaping    E-Cigarette Use Never User      E-Cigarette/Vaping Substances     Social History     Tobacco Use    Smoking status: Former Smoker     Packs/day: 1 00     Years: 20 00     Pack years: 20 00     Types: Cigarettes     Quit date: 1996     Years since quittin 8    Smokeless tobacco: Never Used   Vaping Use    Vaping Use: Never used   Substance Use Topics    Alcohol use: Not Currently     Comment: rarely    Drug use: No       Review of Systems   Constitutional: Negative for chills and fever  HENT: Negative for ear pain and sore throat  Eyes: Negative for pain and visual disturbance  Respiratory: Positive for shortness of breath  Negative for cough  Cardiovascular: Positive for palpitations  Negative for chest pain  Gastrointestinal: Negative for abdominal pain and vomiting  Genitourinary: Negative for dysuria and hematuria  Musculoskeletal: Negative for arthralgias and back pain  Skin: Negative for color change and rash  Neurological: Negative for seizures and syncope  All other systems reviewed and are negative  Physical Exam  Physical Exam  Vitals and nursing note reviewed  Constitutional:       General: She is not in acute distress  HENT:      Head: Atraumatic  Right Ear: External ear normal       Left Ear: External ear normal       Nose: Nose normal       Mouth/Throat:      Mouth: Mucous membranes are moist       Pharynx: Oropharynx is clear  Eyes:      General: No scleral icterus  Conjunctiva/sclera: Conjunctivae normal    Cardiovascular:      Rate and Rhythm: Normal rate and regular rhythm  Pulses: Normal pulses  Pulmonary:      Effort: Pulmonary effort is normal  No respiratory distress  Breath sounds: No rales  Comments: Decreased breath sounds bilateral bases  Abdominal:      General: Abdomen is flat  Bowel sounds are normal  There is no distension  Tenderness: There is no abdominal tenderness  There is no guarding or rebound  Musculoskeletal:         General: Normal range of motion  Right lower leg: Edema present  Left lower leg: Edema present  Skin:     Capillary Refill: Capillary refill takes less than 2 seconds  Findings: No rash  Neurological:      General: No focal deficit present  Mental Status: She is alert and oriented to person, place, and time  Vital Signs  ED Triage Vitals [05/13/22 0015]   Temperature Pulse Respirations Blood Pressure SpO2   98 8 °F (37 1 °C) 102 22 (!) 185/88 98 %      Temp Source Heart Rate Source Patient Position - Orthostatic VS BP Location FiO2 (%)   Oral Monitor Lying Left arm --      Pain Score       3           Vitals:    05/13/22 0015 05/13/22 0130 05/13/22 0300 05/13/22 0330   BP: (!) 185/88 142/66 147/65 146/65   Pulse: 102 75 71 71   Patient Position - Orthostatic VS: Lying Lying  Sitting         Visual Acuity      ED Medications  Medications   sulfamethoxazole-trimethoprim (BACTRIM DS) 800-160 mg per tablet 1 tablet (1 tablet Oral Given 5/13/22 0313)       Diagnostic Studies  Results Reviewed     Procedure Component Value Units Date/Time    HS Troponin I 2hr [[id number]]  (Normal) Collected: 05/13/22 0218    Lab Status: Final result Specimen: Blood from Arm, Right Updated: 05/13/22 0252     hs TnI 2hr 10 ng/L      Delta 2hr hsTnI 1 ng/L     Urine Microscopic [[id number]]  (Abnormal) Collected: 05/13/22 0218    Lab Status: Final result Specimen: Urine, Clean Catch Updated: 05/13/22 0238     RBC, UA 1-2 /hpf      WBC, UA 20-30 /hpf      Epithelial Cells Moderate /hpf      Bacteria, UA Occasional /hpf      MUCUS THREADS Occasional    Urine culture [[id number]] Collected: 05/13/22 0218    Lab Status:  In process Specimen: Urine, Clean Catch Updated: 05/13/22 0237    UA w Reflex to Microscopic w Reflex to Culture [[id number]]  (Abnormal) Collected: 05/13/22 0218    Lab Status: Final result Specimen: Urine, Clean Catch Updated: 05/13/22 0229     Color, UA Yellow Clarity, UA Clear     Specific Gravity, UA 1 025     pH, UA 5 5     Leukocytes, UA Trace     Nitrite, UA Negative     Protein, UA 30 (1+) mg/dl      Glucose, UA Negative mg/dl      Ketones, UA Negative mg/dl      Urobilinogen, UA 0 2 E U /dl      Bilirubin, UA Negative     Blood, UA Trace-Intact    Protime-INR [[id number]]  (Abnormal) Collected: 05/13/22 0027    Lab Status: Final result Specimen: Blood from Arm, Right Updated: 05/13/22 0059     Protime 14 8 seconds      INR 1 18    APTT [[id number]]  (Normal) Collected: 05/13/22 0027    Lab Status: Final result Specimen: Blood from Arm, Right Updated: 05/13/22 0059     PTT 33 seconds     HS Troponin 0hr (reflex protocol) [[id number]]  (Normal) Collected: 05/13/22 0027    Lab Status: Final result Specimen: Blood from Arm, Right Updated: 05/13/22 0056     hs TnI 0hr 9 ng/L     Magnesium [[id number]]  (Normal) Collected: 05/13/22 0027    Lab Status: Final result Specimen: Blood from Arm, Right Updated: 05/13/22 0055     Magnesium 1 8 mg/dL     NT-BNP PRO [[id number]]  (Abnormal) Collected: 05/13/22 0027    Lab Status: Final result Specimen: Blood from Arm, Right Updated: 05/13/22 0055     NT-proBNP 1,548 pg/mL     Comprehensive metabolic panel [[id number]]  (Abnormal) Collected: 05/13/22 0027    Lab Status: Final result Specimen: Blood from Arm, Right Updated: 05/13/22 0048     Sodium 141 mmol/L      Potassium 4 4 mmol/L      Chloride 99 mmol/L      CO2 37 mmol/L      ANION GAP 5 mmol/L      BUN 36 mg/dL      Creatinine 1 49 mg/dL      Glucose 282 mg/dL      Calcium 8 0 mg/dL      Corrected Calcium 8 7 mg/dL      AST 10 U/L      ALT 15 U/L      Alkaline Phosphatase 91 U/L      Total Protein 7 4 g/dL      Albumin 3 1 g/dL      Total Bilirubin 0 19 mg/dL      eGFR 35 ml/min/1 73sq m     Narrative:      [facility] guidelines for Chronic Kidney Disease (CKD):     Stage 1 with normal or high GFR (GFR > 90 mL/min/1 73 square meters)    Stage 2 Mild CKD (GFR = 60-89 mL/min/1 73 square meters)    Stage 3A Moderate CKD (GFR = 45-59 mL/min/1 73 square meters)    Stage 3B Moderate CKD (GFR = 30-44 mL/min/1 73 square meters)    Stage 4 Severe CKD (GFR = 15-29 mL/min/1 73 square meters)    Stage 5 End Stage CKD (GFR <15 mL/min/1 73 square meters)  Note: GFR calculation is accurate only with a steady state creatinine    CBC and differential [[id number]]  (Abnormal) Collected: 05/13/22 0027    Lab Status: Final result Specimen: Blood from Arm, Right Updated: 05/13/22 0032     WBC 8 30 Thousand/uL      RBC 3 30 Million/uL      Hemoglobin 9 2 g/dL      Hematocrit 31 4 %      MCV 95 fL      MCH 27 9 pg      MCHC 29 3 g/dL      RDW 14 3 %      MPV 10 5 fL      Platelets 606 Thousands/uL      nRBC 0 /100 WBCs      Neutrophils Relative 58 %      Immat GRANS % 0 %      Lymphocytes Relative 26 %      Monocytes Relative 10 %      Eosinophils Relative 5 %      Basophils Relative 1 %      Neutrophils Absolute 4 87 Thousands/µL      Immature Grans Absolute 0 03 Thousand/uL      Lymphocytes Absolute 2 12 Thousands/µL      Monocytes Absolute 0 84 Thousand/µL      Eosinophils Absolute 0 38 Thousand/µL      Basophils Absolute 0 06 Thousands/µL                  XR chest 1 view portable   Final Result by Jason Denson MD (05/13 0080)      Mild pulmonary vascular congestion with small bilateral pleural effusions  Workstation performed: [id number]                    Procedures  Procedures         ED Course                                             MDM  Number of Diagnoses or Management Options  SEBASTIAN (acute kidney injury) ([address] )  Palpitations  UTI (urinary tract infection)  Diagnosis management comments: Pulse ox 99% on nasal cannula indicating adequate oxygenation  CXR:  Mild PVC as read by me    Patient is feeling better on arrival and his found to be in normal sinus rhythm  Will check lab work and observe on the cardiac monitor    Patient signed out next provider   Asher Dawn  Amount and/or Complexity of Data Reviewed  Clinical lab tests: ordered and reviewed  Tests in the radiology section of CPT®: ordered and reviewed  Decide to obtain previous medical records or to obtain history from someone other than the patient: yes  Review and summarize past medical records: yes  Discuss the patient with other providers: yes  Independent visualization of images, tracings, or specimens: yes    Patient Progress  Patient progress: improved      Disposition  Final diagnoses:   Palpitations   SEBASTIAN (acute kidney injury) ([facility] [address] )   UTI (urinary tract infection)     Time reflects when diagnosis was documented in both MDM as applicable and the Disposition within this note     Time User Action Codes Description Comment    5/13/2022  3:21 AM Monica Jose Maria Add [R00 2] Palpitations     5/13/2022  3:21 AM Monica Jose Maria Add [N17 9] SEBASTIAN (acute kidney injury) ([facility] [address] )     5/13/2022  3:21 AM Monica Jose Maria Add [N39 0] UTI (urinary tract infection)       ED Disposition     ED Disposition   Discharge    Condition   Stable    Date/Time   Fri May 13, 2022  3:21 AM    Comment   Maxine Daigle discharge to home/self care  Follow-up Information     Follow up With Specialties Details Why [address] [place] [address], [address]   [address]  [address] [facility]  [phone]            Discharge Medication List as of 5/13/2022  3:23 AM      START taking these medications    Details   sulfamethoxazole-trimethoprim (BACTRIM DS) 800-160 mg per tablet Take 1 tablet by mouth in the morning and 1 tablet in the evening  Do all this for 7 days  smx-tmp DS (BACTRIM) 800-160 mg tabs (1tab q12 D10)  , Starting Fri 5/13/2022, Until Fri 5/20/2022, Normal         CONTINUE these medications which have NOT CHANGED    Details   acetaminophen (TYLENOL) 325 mg tablet Take 650 mg by mouth every 6 (six) hours as needed for mild pain  , Historical Med      albuterol (PROVENTIL HFA,VENTOLIN HFA) 90 mcg/act inhaler Inhale 2 puffs every 6 (six) hours as needed for wheezing, Starting Fri 3/25/2022, Normal      ammonium lactate (LAC-HYDRIN) 12 % lotion APPLY TOPICALLY TO AFFECTED AREAS twice a day, Historical Med      apixaban (ELIQUIS) 5 mg Take 1 tablet (5 mg total) by mouth 2 (two) times a day, Starting Fri 4/29/2022, Sample      b complex-vitamin C-folic acid (NEPHROCAPS) 1 mg capsule Take 1 capsule by mouth daily, Starting Tue 5/3/2022, Until Thu 6/2/2022, Normal      docusate sodium (COLACE) 100 mg capsule Take 100 mg by mouth in the morning, Historical Med      glucose blood (OneTouch Ultra) test strip Test twice daily  , Normal      Insulin Pen Needle (NovoFine Autocover) 30G X 8 MM MISC Inject under the skin daily, Starting Fri 4/22/2022, Normal      Lancets (onetouch ultrasoft) lancets Use once daily, Normal      lidocaine (LIDODERM) 5 % Apply 2 patches topically daily Remove & Discard patch within 12 hours or as directed by MD, Starting Mon 2/21/2022, No Print      metoprolol tartrate (LOPRESSOR) 25 mg tablet Take 1 tablet (25 mg total) by mouth every 12 (twelve) hours, Starting Tue 5/3/2022, Normal      montelukast (SINGULAIR) 10 mg tablet Take 10 mg by mouth daily  , Historical Med      nystatin (MYCOSTATIN) powder Apply topically 2 (two) times a day, Historical Med      pregabalin (LYRICA) 100 mg capsule Take 100 mg by mouth 2 (two) times a day , Historical Med      rosuvastatin (CRESTOR) 10 MG tablet 10 mg daily  , Starting Thu 8/29/2019, Historical Med      torsemide (DEMADEX) 10 mg tablet Take 1 tablet (10 mg total) by mouth daily, Starting Wed 3/23/2022, Until Thu 4/28/2022, Normal      Toujeo SoloStar 300 units/mL CONCENTRATED U-300 injection pen (1-unit dial) Inject 35 Units under the skin daily at bedtime, Starting Fri 4/22/2022, No Print      Trulicity 1 5 MJ/0 8OD SOPN inject 0 5 milliliter subcutaneously every week 28, Historical Med             Outpatient Discharge Orders   Basic metabolic panel   Standing Status: Future Standing Exp   Date: 05/13/23       PDMP Review       Value Time User    PDMP Reviewed  Yes 3/12/2022 10:25 AM Nando Couch, [facility]          ED Provider  Electronically Signed by           Juan Da Silva DO  05/13/22 1531

## 2022-05-14 LAB — BACTERIA UR CULT: NORMAL

## 2022-05-16 ENCOUNTER — APPOINTMENT (EMERGENCY)
Dept: RADIOLOGY | Facility: HOSPITAL | Age: 72
DRG: 291 | End: 2022-05-16
Payer: MEDICARE

## 2022-05-16 ENCOUNTER — HOSPITAL ENCOUNTER (INPATIENT)
Facility: HOSPITAL | Age: 72
LOS: 7 days | Discharge: HOME HEALTH | DRG: 291 | End: 2022-05-23
Attending: EMERGENCY MEDICINE | Admitting: INTERNAL MEDICINE
Payer: MEDICARE

## 2022-05-16 DIAGNOSIS — I50.9 CHF EXACERBATION (HCC): Primary | ICD-10-CM

## 2022-05-16 DIAGNOSIS — J96.22 ACUTE ON CHRONIC RESPIRATORY FAILURE WITH HYPOXIA AND HYPERCAPNIA (HCC): ICD-10-CM

## 2022-05-16 DIAGNOSIS — E66.01 MORBID OBESITY (HCC): ICD-10-CM

## 2022-05-16 DIAGNOSIS — R06.03 RESPIRATORY DISTRESS: ICD-10-CM

## 2022-05-16 DIAGNOSIS — E66.2 OBESITY HYPOVENTILATION SYNDROME (HCC): ICD-10-CM

## 2022-05-16 DIAGNOSIS — I48.91 ATRIAL FIBRILLATION WITH RAPID VENTRICULAR RESPONSE (HCC): ICD-10-CM

## 2022-05-16 DIAGNOSIS — E11.9 DIABETES MELLITUS TYPE 2 IN NONOBESE (HCC): ICD-10-CM

## 2022-05-16 DIAGNOSIS — J96.21 ACUTE ON CHRONIC RESPIRATORY FAILURE WITH HYPOXIA AND HYPERCAPNIA (HCC): ICD-10-CM

## 2022-05-16 DIAGNOSIS — I48.0 PAROXYSMAL ATRIAL FIBRILLATION (HCC): ICD-10-CM

## 2022-05-16 DIAGNOSIS — R06.89 HYPERCAPNIA: ICD-10-CM

## 2022-05-16 DIAGNOSIS — I50.31 ACUTE DIASTOLIC CONGESTIVE HEART FAILURE (HCC): ICD-10-CM

## 2022-05-16 DIAGNOSIS — E11.00 TYPE 2 DIABETES MELLITUS WITH HYPEROSMOLARITY WITHOUT COMA, WITHOUT LONG-TERM CURRENT USE OF INSULIN (HCC): ICD-10-CM

## 2022-05-16 DIAGNOSIS — I50.9 CHF (CONGESTIVE HEART FAILURE) (HCC): ICD-10-CM

## 2022-05-16 DIAGNOSIS — J96.22 ACUTE ON CHRONIC RESPIRATORY FAILURE WITH HYPERCAPNIA (HCC): ICD-10-CM

## 2022-05-16 DIAGNOSIS — R09.81 CONGESTION OF NASAL SINUS: ICD-10-CM

## 2022-05-16 PROBLEM — I50.33 ACUTE ON CHRONIC DIASTOLIC CHF (CONGESTIVE HEART FAILURE) (HCC): Status: ACTIVE | Noted: 2022-03-29

## 2022-05-16 PROBLEM — N39.0 UTI (URINARY TRACT INFECTION): Status: ACTIVE | Noted: 2022-05-16

## 2022-05-16 PROBLEM — G93.40 ACUTE ENCEPHALOPATHY: Status: ACTIVE | Noted: 2022-05-16

## 2022-05-16 LAB
2HR DELTA HS TROPONIN: 8 NG/L
4HR DELTA HS TROPONIN: 12 NG/L
ALBUMIN SERPL BCP-MCNC: 3.3 G/DL (ref 3.5–5)
ALP SERPL-CCNC: 84 U/L (ref 46–116)
ALT SERPL W P-5'-P-CCNC: 18 U/L (ref 12–78)
ANION GAP SERPL CALCULATED.3IONS-SCNC: 4 MMOL/L (ref 4–13)
AST SERPL W P-5'-P-CCNC: 19 U/L (ref 5–45)
ATRIAL RATE: 156 BPM
ATRIAL RATE: 73 BPM
BACTERIA UR QL AUTO: ABNORMAL /HPF
BASE EX.OXY STD BLDV CALC-SCNC: 70.6 % (ref 60–80)
BASE EX.OXY STD BLDV CALC-SCNC: 80.4 % (ref 60–80)
BASE EX.OXY STD BLDV CALC-SCNC: 89.1 % (ref 60–80)
BASE EXCESS BLDV CALC-SCNC: 1.9 MMOL/L
BASE EXCESS BLDV CALC-SCNC: 4.8 MMOL/L
BASE EXCESS BLDV CALC-SCNC: 6.6 MMOL/L
BASOPHILS # BLD AUTO: 0.1 THOUSANDS/ΜL (ref 0–0.1)
BASOPHILS NFR BLD AUTO: 1 % (ref 0–1)
BILIRUB SERPL-MCNC: 0.15 MG/DL (ref 0.2–1)
BILIRUB UR QL STRIP: NEGATIVE
BODY TEMPERATURE: 99.4 DEGREES FEHRENHEIT
BUN SERPL-MCNC: 41 MG/DL (ref 5–25)
CALCIUM ALBUM COR SERPL-MCNC: 9.2 MG/DL (ref 8.3–10.1)
CALCIUM SERPL-MCNC: 8.6 MG/DL (ref 8.3–10.1)
CARDIAC TROPONIN I PNL SERPL HS: 15 NG/L
CARDIAC TROPONIN I PNL SERPL HS: 23 NG/L
CARDIAC TROPONIN I PNL SERPL HS: 27 NG/L
CHLORIDE SERPL-SCNC: 102 MMOL/L (ref 100–108)
CLARITY UR: CLEAR
CO2 SERPL-SCNC: 37 MMOL/L (ref 21–32)
COLOR UR: ABNORMAL
CREAT SERPL-MCNC: 2.13 MG/DL (ref 0.6–1.3)
EOSINOPHIL # BLD AUTO: 0.05 THOUSAND/ΜL (ref 0–0.61)
EOSINOPHIL NFR BLD AUTO: 0 % (ref 0–6)
ERYTHROCYTE [DISTWIDTH] IN BLOOD BY AUTOMATED COUNT: 14.4 % (ref 11.6–15.1)
FLUAV RNA RESP QL NAA+PROBE: NEGATIVE
FLUBV RNA RESP QL NAA+PROBE: NEGATIVE
GFR SERPL CREATININE-BSD FRML MDRD: 23 ML/MIN/1.73SQ M
GLUCOSE SERPL-MCNC: 274 MG/DL (ref 65–140)
GLUCOSE SERPL-MCNC: 321 MG/DL (ref 65–140)
GLUCOSE SERPL-MCNC: 332 MG/DL (ref 65–140)
GLUCOSE SERPL-MCNC: 334 MG/DL (ref 65–140)
GLUCOSE UR STRIP-MCNC: NEGATIVE MG/DL
HCO3 BLDV-SCNC: 32.8 MMOL/L (ref 24–30)
HCO3 BLDV-SCNC: 33 MMOL/L (ref 24–30)
HCO3 BLDV-SCNC: 38.2 MMOL/L (ref 24–30)
HCT VFR BLD AUTO: 37.2 % (ref 34.8–46.1)
HGB BLD-MCNC: 10.5 G/DL (ref 11.5–15.4)
HGB UR QL STRIP.AUTO: ABNORMAL
IMM GRANULOCYTES # BLD AUTO: 0.26 THOUSAND/UL (ref 0–0.2)
IMM GRANULOCYTES NFR BLD AUTO: 2 % (ref 0–2)
IPAP: 14
KETONES UR STRIP-MCNC: NEGATIVE MG/DL
LEUKOCYTE ESTERASE UR QL STRIP: ABNORMAL
LYMPHOCYTES # BLD AUTO: 1.47 THOUSANDS/ΜL (ref 0.6–4.47)
LYMPHOCYTES NFR BLD AUTO: 9 % (ref 14–44)
MCH RBC QN AUTO: 27.9 PG (ref 26.8–34.3)
MCHC RBC AUTO-ENTMCNC: 28.2 G/DL (ref 31.4–37.4)
MCV RBC AUTO: 99 FL (ref 82–98)
MONOCYTES # BLD AUTO: 1.08 THOUSAND/ΜL (ref 0.17–1.22)
MONOCYTES NFR BLD AUTO: 6 % (ref 4–12)
MUCOUS THREADS UR QL AUTO: ABNORMAL
NEUTROPHILS # BLD AUTO: 13.9 THOUSANDS/ΜL (ref 1.85–7.62)
NEUTS SEG NFR BLD AUTO: 82 % (ref 43–75)
NITRITE UR QL STRIP: NEGATIVE
NON VENT- BIPAP: ABNORMAL
NON-SQ EPI CELLS URNS QL MICRO: ABNORMAL /HPF
NRBC BLD AUTO-RTO: 0 /100 WBCS
NT-PROBNP SERPL-MCNC: 3158 PG/ML
O2 CT BLDV-SCNC: 10.5 ML/DL
O2 CT BLDV-SCNC: 13.6 ML/DL
O2 CT BLDV-SCNC: 14.6 ML/DL
PCO2 BLDV: 127.5 MM HG (ref 42–50)
PCO2 BLDV: 56 MM HG (ref 42–50)
PCO2 BLDV: 95.8 MM HG (ref 42–50)
PEEP MAX SETTING VENT: 6 CM[H2O]
PH BLDV: 7.09 [PH] (ref 7.3–7.4)
PH BLDV: 7.16 [PH] (ref 7.3–7.4)
PH BLDV: 7.39 [PH] (ref 7.3–7.4)
PH UR STRIP.AUTO: 5.5 [PH]
PLATELET # BLD AUTO: 243 THOUSANDS/UL (ref 149–390)
PMV BLD AUTO: 10.9 FL (ref 8.9–12.7)
PO2 BLDV: 35 MM HG (ref 35–45)
PO2 BLDV: 55.6 MM HG (ref 35–45)
PO2 BLDV: 68.8 MM HG (ref 35–45)
POTASSIUM SERPL-SCNC: 5.2 MMOL/L (ref 3.5–5.3)
PROCALCITONIN SERPL-MCNC: 0.07 NG/ML
PROT SERPL-MCNC: 8.3 G/DL (ref 6.4–8.2)
PROT UR STRIP-MCNC: ABNORMAL MG/DL
QRS AXIS: -3 DEGREES
QRS AXIS: -9 DEGREES
QRSD INTERVAL: 64 MS
QRSD INTERVAL: 74 MS
QT INTERVAL: 298 MS
QT INTERVAL: 328 MS
QTC INTERVAL: 433 MS
QTC INTERVAL: 471 MS
RBC # BLD AUTO: 3.76 MILLION/UL (ref 3.81–5.12)
RBC #/AREA URNS AUTO: ABNORMAL /HPF
RSV RNA RESP QL NAA+PROBE: NEGATIVE
SARS-COV-2 RNA RESP QL NAA+PROBE: NEGATIVE
SODIUM SERPL-SCNC: 143 MMOL/L (ref 136–145)
SP GR UR STRIP.AUTO: 1.02 (ref 1–1.03)
T WAVE AXIS: 45 DEGREES
T WAVE AXIS: 53 DEGREES
UROBILINOGEN UR QL STRIP.AUTO: 0.2 E.U./DL
VENT BIPAP FIO2: 25 %
VENTRICULAR RATE: 124 BPM
VENTRICULAR RATE: 127 BPM
WBC # BLD AUTO: 16.86 THOUSAND/UL (ref 4.31–10.16)
WBC #/AREA URNS AUTO: ABNORMAL /HPF

## 2022-05-16 PROCEDURE — 81001 URINALYSIS AUTO W/SCOPE: CPT | Performed by: EMERGENCY MEDICINE

## 2022-05-16 PROCEDURE — 71045 X-RAY EXAM CHEST 1 VIEW: CPT

## 2022-05-16 PROCEDURE — 82805 BLOOD GASES W/O2 SATURATION: CPT | Performed by: PHYSICIAN ASSISTANT

## 2022-05-16 PROCEDURE — 82805 BLOOD GASES W/O2 SATURATION: CPT | Performed by: EMERGENCY MEDICINE

## 2022-05-16 PROCEDURE — 87040 BLOOD CULTURE FOR BACTERIA: CPT

## 2022-05-16 PROCEDURE — 94760 N-INVAS EAR/PLS OXIMETRY 1: CPT

## 2022-05-16 PROCEDURE — 70450 CT HEAD/BRAIN W/O DYE: CPT

## 2022-05-16 PROCEDURE — 87154 CUL TYP ID BLD PTHGN 6+ TRGT: CPT

## 2022-05-16 PROCEDURE — 99291 CRITICAL CARE FIRST HOUR: CPT | Performed by: INTERNAL MEDICINE

## 2022-05-16 PROCEDURE — 84145 PROCALCITONIN (PCT): CPT | Performed by: STUDENT IN AN ORGANIZED HEALTH CARE EDUCATION/TRAINING PROGRAM

## 2022-05-16 PROCEDURE — 93005 ELECTROCARDIOGRAM TRACING: CPT

## 2022-05-16 PROCEDURE — 85025 COMPLETE CBC W/AUTO DIFF WBC: CPT | Performed by: EMERGENCY MEDICINE

## 2022-05-16 PROCEDURE — 99285 EMERGENCY DEPT VISIT HI MDM: CPT

## 2022-05-16 PROCEDURE — 0241U HB NFCT DS VIR RESP RNA 4 TRGT: CPT | Performed by: EMERGENCY MEDICINE

## 2022-05-16 PROCEDURE — 94640 AIRWAY INHALATION TREATMENT: CPT

## 2022-05-16 PROCEDURE — 94002 VENT MGMT INPAT INIT DAY: CPT

## 2022-05-16 PROCEDURE — 93010 ELECTROCARDIOGRAM REPORT: CPT | Performed by: INTERNAL MEDICINE

## 2022-05-16 PROCEDURE — 84484 ASSAY OF TROPONIN QUANT: CPT | Performed by: EMERGENCY MEDICINE

## 2022-05-16 PROCEDURE — G1004 CDSM NDSC: HCPCS

## 2022-05-16 PROCEDURE — 83880 ASSAY OF NATRIURETIC PEPTIDE: CPT | Performed by: EMERGENCY MEDICINE

## 2022-05-16 PROCEDURE — 96374 THER/PROPH/DIAG INJ IV PUSH: CPT

## 2022-05-16 PROCEDURE — 87449 NOS EACH ORGANISM AG IA: CPT

## 2022-05-16 PROCEDURE — 36415 COLL VENOUS BLD VENIPUNCTURE: CPT | Performed by: EMERGENCY MEDICINE

## 2022-05-16 PROCEDURE — 99291 CRITICAL CARE FIRST HOUR: CPT | Performed by: EMERGENCY MEDICINE

## 2022-05-16 PROCEDURE — 82948 REAGENT STRIP/BLOOD GLUCOSE: CPT

## 2022-05-16 PROCEDURE — 94003 VENT MGMT INPAT SUBQ DAY: CPT

## 2022-05-16 PROCEDURE — 87081 CULTURE SCREEN ONLY: CPT | Performed by: INTERNAL MEDICINE

## 2022-05-16 PROCEDURE — 80053 COMPREHEN METABOLIC PANEL: CPT | Performed by: EMERGENCY MEDICINE

## 2022-05-16 RX ORDER — INSULIN LISPRO 100 [IU]/ML
2-12 INJECTION, SOLUTION INTRAVENOUS; SUBCUTANEOUS EVERY 6 HOURS SCHEDULED
Status: DISCONTINUED | OUTPATIENT
Start: 2022-05-16 | End: 2022-05-17

## 2022-05-16 RX ORDER — DOCUSATE SODIUM 100 MG/1
100 CAPSULE, LIQUID FILLED ORAL DAILY
Status: DISCONTINUED | OUTPATIENT
Start: 2022-05-16 | End: 2022-05-23 | Stop reason: HOSPADM

## 2022-05-16 RX ORDER — FUROSEMIDE 10 MG/ML
40 INJECTION INTRAMUSCULAR; INTRAVENOUS ONCE
Status: COMPLETED | OUTPATIENT
Start: 2022-05-16 | End: 2022-05-16

## 2022-05-16 RX ORDER — NYSTATIN 100000 [USP'U]/G
POWDER TOPICAL 2 TIMES DAILY
Status: DISCONTINUED | OUTPATIENT
Start: 2022-05-16 | End: 2022-05-23 | Stop reason: HOSPADM

## 2022-05-16 RX ORDER — ALBUTEROL SULFATE 90 UG/1
2 AEROSOL, METERED RESPIRATORY (INHALATION) EVERY 6 HOURS PRN
Status: DISCONTINUED | OUTPATIENT
Start: 2022-05-16 | End: 2022-05-23 | Stop reason: HOSPADM

## 2022-05-16 RX ORDER — CEFTRIAXONE 1 G/50ML
1000 INJECTION, SOLUTION INTRAVENOUS EVERY 24 HOURS
Status: DISCONTINUED | OUTPATIENT
Start: 2022-05-16 | End: 2022-05-18

## 2022-05-16 RX ORDER — PRAVASTATIN SODIUM 80 MG/1
80 TABLET ORAL
Refills: 0 | Status: DISCONTINUED | OUTPATIENT
Start: 2022-05-16 | End: 2022-05-23 | Stop reason: HOSPADM

## 2022-05-16 RX ORDER — LEVALBUTEROL 1.25 MG/.5ML
1.25 SOLUTION, CONCENTRATE RESPIRATORY (INHALATION)
Status: DISCONTINUED | OUTPATIENT
Start: 2022-05-16 | End: 2022-05-23 | Stop reason: HOSPADM

## 2022-05-16 RX ORDER — INSULIN GLARGINE 100 [IU]/ML
35 INJECTION, SOLUTION SUBCUTANEOUS
Status: DISCONTINUED | OUTPATIENT
Start: 2022-05-16 | End: 2022-05-16

## 2022-05-16 RX ORDER — INSULIN LISPRO 100 [IU]/ML
2-12 INJECTION, SOLUTION INTRAVENOUS; SUBCUTANEOUS EVERY 6 HOURS
Status: DISCONTINUED | OUTPATIENT
Start: 2022-05-16 | End: 2022-05-16

## 2022-05-16 RX ADMIN — FUROSEMIDE 40 MG: 10 INJECTION, SOLUTION INTRAMUSCULAR; INTRAVENOUS at 14:13

## 2022-05-16 RX ADMIN — INSULIN LISPRO 6 UNITS: 100 INJECTION, SOLUTION INTRAVENOUS; SUBCUTANEOUS at 19:00

## 2022-05-16 RX ADMIN — INSULIN LISPRO 8 UNITS: 100 INJECTION, SOLUTION INTRAVENOUS; SUBCUTANEOUS at 14:37

## 2022-05-16 RX ADMIN — METOPROLOL TARTRATE 25 MG: 25 TABLET, FILM COATED ORAL at 21:43

## 2022-05-16 RX ADMIN — LEVALBUTEROL HYDROCHLORIDE 1.25 MG: 1.25 SOLUTION, CONCENTRATE RESPIRATORY (INHALATION) at 19:32

## 2022-05-16 RX ADMIN — IPRATROPIUM BROMIDE 0.5 MG: 0.5 SOLUTION RESPIRATORY (INHALATION) at 15:44

## 2022-05-16 RX ADMIN — IPRATROPIUM BROMIDE 0.5 MG: 0.5 SOLUTION RESPIRATORY (INHALATION) at 19:32

## 2022-05-16 RX ADMIN — CEFTRIAXONE 1000 MG: 1 INJECTION, SOLUTION INTRAVENOUS at 14:21

## 2022-05-16 RX ADMIN — FUROSEMIDE 40 MG: 10 INJECTION, SOLUTION INTRAMUSCULAR; INTRAVENOUS at 09:06

## 2022-05-16 RX ADMIN — NYSTATIN 1 APPLICATION: 100000 POWDER TOPICAL at 14:27

## 2022-05-16 RX ADMIN — LEVALBUTEROL HYDROCHLORIDE 1.25 MG: 1.25 SOLUTION, CONCENTRATE RESPIRATORY (INHALATION) at 15:44

## 2022-05-16 NOTE — ASSESSMENT & PLAN NOTE
· Patient was seen on 5/13 in the ED and found to have UTI treated with Bactrim (will hold)  · UA and urine microscopic-trace leukocytes and WBC 4-10 (improved from previous UA and urine microscopic on 5/13)  · Day # 2 Ceftriaxone 1000 mg IV Q24H

## 2022-05-16 NOTE — ASSESSMENT & PLAN NOTE
· Patient suffered from fall resulting in Comminuted displaced fracture of the proximal humerus (2/23/22)    · S/p left shoulder reverse complete arthroplasty (3/1/22)

## 2022-05-16 NOTE — ASSESSMENT & PLAN NOTE
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

## 2022-05-16 NOTE — ASSESSMENT & PLAN NOTE
· On presentation patient was SOB with HR between 115-140, irregularly irregular rhythmn  · Continue Lopressor 25 BID  · Telemetry  · Optimize electrolytes

## 2022-05-16 NOTE — ED PROVIDER NOTES
History  Chief Complaint   Patient presents with    Shortness of Breath    Chest Pain    Altered Mental Status     Pt altered, lethargic, sob, was here [facility] for UTI     HPI  Patient is a 14-year-old female history of diastolic heart failure, paroxysmal atrial fibrillation anticoagulated on Eliquis , diabetes presenting for evaluation of multiple complaints including chest pain, shortness of breath, increased work of breathing, somnolence and change in mental status  Patient is poor historian secondary to mental status  Patient states that she has felt fatigued, short of breath, had chest pain for unclear period of time  Patient states she has had a cough, denies productive component, denies fevers, chills, headache, sore throat, myalgia, recent travel or sick contacts  Patient denies weight gain or leg swelling  Patient states sensation like her heart is racing  Patient denies prior history of COPD or asthma  Patient denies abdominal pain, urinary changes, diarrhea, constipation, blood per rectum, melena, nausea, vomiting  Patient noted to be diaphoretic emergency department  Patient satting in the mid 80s on home 2 L nasal cannula, transition is 6 L nasal cannula in emergency department with continued increased work of breathing and somnolence  Prior to Admission Medications   Prescriptions Last Dose Informant Patient Reported? Taking?    Insulin Pen Needle (NovoFine Autocover) 30G X 8 MM MISC Unknown at Unknown time Self No No   Sig: Inject under the skin daily   Lancets (onetouch ultrasoft) lancets Unknown at Unknown time Self No No   Sig: Use once daily   Toujeo SoloStar 300 units/mL CONCENTRATED U-300 injection pen (1-unit dial) Unknown at Unknown time Self No No   Sig: Inject 35 Units under the skin daily at bedtime   Trulicity 1 5 QX/3 5YG SOPN Unknown at Unknown time Self Yes No   Sig: inject 0 5 milliliter subcutaneously every week 28   acetaminophen (TYLENOL) 325 mg tablet Unknown at Unknown time Self Yes No   Sig: Take 650 mg by mouth every 6 (six) hours as needed for mild pain     albuterol (PROVENTIL HFA,VENTOLIN HFA) 90 mcg/act inhaler Unknown at Unknown time Self No No   Sig: Inhale 2 puffs every 6 (six) hours as needed for wheezing   ammonium lactate (LAC-HYDRIN) 12 % lotion Unknown at Unknown time Self Yes No   Sig: APPLY TOPICALLY TO AFFECTED AREAS twice a day   apixaban (ELIQUIS) 5 mg Unknown at Unknown time  No No   Sig: Take 1 tablet (5 mg total) by mouth 2 (two) times a day   b complex-vitamin C-folic acid (NEPHROCAPS) 1 mg capsule Unknown at Unknown time  No No   Sig: Take 1 capsule by mouth daily   docusate sodium (COLACE) 100 mg capsule Unknown at Unknown time Self Yes No   Sig: Take 100 mg by mouth in the morning   glucose blood (OneTouch Ultra) test strip Unknown at Unknown time Self No No   Sig: Test twice daily  lidocaine (LIDODERM) 5 %  Self No No   Sig: Apply 2 patches topically daily Remove & Discard patch within 12 hours or as directed by MD   metoprolol tartrate (LOPRESSOR) 25 mg tablet Unknown at Unknown time  No No   Sig: Take 1 tablet (25 mg total) by mouth every 12 (twelve) hours   montelukast (SINGULAIR) 10 mg tablet Unknown at Unknown time Self Yes No   Sig: Take 10 mg by mouth daily     nystatin (MYCOSTATIN) powder Unknown at Unknown time Self Yes No   Sig: Apply topically 2 (two) times a day   pregabalin (LYRICA) 100 mg capsule Unknown at Unknown time Self Yes No   Sig: Take 100 mg by mouth 2 (two) times a day    rosuvastatin (CRESTOR) 10 MG tablet Unknown at Unknown time Self Yes No   Sig: 10 mg daily     sulfamethoxazole-trimethoprim (BACTRIM DS) 800-160 mg per tablet Unknown at Unknown time  No No   Sig: Take 1 tablet by mouth in the morning and 1 tablet in the evening  Do all this for 7 days  smx-tmp DS (BACTRIM) 800-160 mg tabs (1tab q12 D10)     torsemide (DEMADEX) 10 mg tablet  Self No No   Sig: Take 1 tablet (10 mg total) by mouth daily      Facility-Administered Medications: None       Past Medical History:   Diagnosis Date    Anemia     Asthma     COVID-19     2022    GERD (gastroesophageal reflux disease)     Hyperlipidemia     Kidney stones     PONV (postoperative nausea and vomiting)     SVT (supraventricular tachycardia) (HCC)        Past Surgical History:   Procedure Laterality Date    APPENDECTOMY      CYSTOSCOPY W/ LASER LITHOTRIPSY Left 2016    Procedure: CYSTOSCOPY URETEROSCOPY WITH LITHOTRIPSY HOLMIUM LASER, RETROGRADE PYELOGRAM AND INSERTION STENT URETERAL;  Surgeon: Shelley Garcia MD;  Location: [address] [place] [address];  Service:     DILATION AND CURETTAGE OF UTERUS      IR THORACENTESIS  3/18/2022    JOINT REPLACEMENT      right knee    KNEE ARTHROPLASTY Right     SC CYSTOURETHROSCOPY,URETER CATHETER Left 2016    Procedure: CYSTOSCOPY RETROGRADE PYELOGRAM WITH INSERTION STENT URETERAL, left;  Surgeon: Shelley Garcia MD;  Location: [address] [place] [address];  Service: Urology    SC RECONSTR TOTAL SHOULDER IMPLANT Left 3/1/2022    Procedure: ARTHROPLASTY SHOULDER REVERSE;  Surgeon: Matty Villegas MD;  Location: [facility];  Service: Orthopedics    SHOULDER ARTHROTOMY Left        Family History   Problem Relation Age of Onset    Heart disease Mother     Emphysema Maternal Grandmother     Heart disease Family      I have reviewed and agree with the history as documented  E-Cigarette/Vaping    E-Cigarette Use Never User      E-Cigarette/Vaping Substances     Social History     Tobacco Use    Smoking status: Former Smoker     Packs/day: 1 00     Years: 20 00     Pack years: 20 00     Types: Cigarettes     Quit date: 1996     Years since quittin 8    Smokeless tobacco: Never Used   Vaping Use    Vaping Use: Never used   Substance Use Topics    Alcohol use: Not Currently     Comment: rarely    Drug use: No       Review of Systems   Constitutional: Positive for fatigue  Negative for chills and fever     HENT: Negative for sore throat  Eyes: Negative for visual disturbance  Respiratory: Positive for cough and shortness of breath  Negative for chest tightness  Cardiovascular: Negative for chest pain  Gastrointestinal: Negative for abdominal distention, abdominal pain, constipation, diarrhea, nausea and vomiting  Endocrine: Negative for polydipsia and polyuria  Genitourinary: Negative for dysuria and hematuria  Musculoskeletal: Negative for arthralgias and myalgias  Skin: Negative for color change and rash  Neurological: Negative for dizziness and headaches  Psychiatric/Behavioral: Positive for confusion  The patient is not nervous/anxious  All other systems reviewed and are negative  Physical Exam  Physical Exam  Vitals reviewed  Constitutional:       General: She is not in acute distress  Appearance: She is morbidly obese  She is not diaphoretic  Comments: Ill-appearing, in moderate respiratory distress, lethargic, diaphoretic   HENT:      Head: Normocephalic and atraumatic  Comments: Moist mucous membranes     Right Ear: External ear normal       Left Ear: External ear normal       Nose: Nose normal       Mouth/Throat:      Pharynx: No oropharyngeal exudate  Eyes:      Pupils: Pupils are equal, round, and reactive to light  Cardiovascular:      Rate and Rhythm: Tachycardia present  Rhythm irregularly irregular  Heart sounds: Normal heart sounds  No murmur heard  No friction rub  No gallop  Comments: Atrial fibrillation rate of 120-130  Extremities warm well perfused  No murmurs rubs or gallops  Pulmonary:      Effort: Tachypnea, accessory muscle usage and respiratory distress present  Breath sounds: Rales present  No wheezing  Comments: Satting 100% on 6 L nasal cannula however increased work of breathing, conversational dyspnea  Rales in all lung fields  Chest:      Chest wall: No tenderness     Abdominal:      General: Bowel sounds are normal  There is no distension  Palpations: Abdomen is soft  There is no mass  Tenderness: There is no abdominal tenderness  There is no guarding  Musculoskeletal:         General: No deformity  Normal range of motion  Cervical back: Normal range of motion  Comments: Trace bilateral lower extremity edema   Lymphadenopathy:      Cervical: No cervical adenopathy  Skin:     General: Skin is warm and dry  Capillary Refill: Capillary refill takes less than 2 seconds  Neurological:      Mental Status: She is oriented to person, place, and time  She is lethargic           Vital Signs  ED Triage Vitals   Temperature Pulse Respirations Blood Pressure SpO2   05/16/22 0847 05/16/22 0847 05/16/22 0847 05/16/22 0847 05/16/22 0847   (!) 95 8 °F (35 4 °C) (!) 114 (!) 32 128/75 100 %      Temp Source Heart Rate Source Patient Position - Orthostatic VS BP Location FiO2 (%)   05/16/22 0847 05/16/22 0847 -- 05/16/22 0847 05/16/22 1353   Temporal Monitor  Right arm 40      Pain Score       --                  Vitals:    05/16/22 1200 05/16/22 1230 05/16/22 1300 05/16/22 1353   BP: 143/63 122/58 120/56 123/70   Pulse: 89 79 77 (!) 124         Visual Acuity  Visual Acuity    Flowsheet Row Most Recent Value   L Pupil Size (mm) 2   R Pupil Size (mm) 2   L Pupil Shape Round   R Pupil Shape Round          ED Medications  Medications   albuterol (PROVENTIL HFA,VENTOLIN HFA) inhaler 2 puff (has no administration in time range)   apixaban (ELIQUIS) tablet 5 mg (has no administration in time range)   docusate sodium (COLACE) capsule 100 mg (100 mg Oral Not Given 5/16/22 1508)   metoprolol tartrate (LOPRESSOR) tablet 25 mg (25 mg Oral Not Given 5/16/22 1508)   nystatin (MYCOSTATIN) powder (1 application Topical Given 5/16/22 1427)   pravastatin (PRAVACHOL) tablet 80 mg (has no administration in time range)   cefTRIAXone (ROCEPHIN) IVPB (premix in dextrose) 1,000 mg 50 mL (1,000 mg Intravenous New Bag 5/16/22 1421)   insulin lispro (HumaLOG) 100 units/mL subcutaneous injection 2-12 Units (8 Units Subcutaneous Given 5/16/22 1437)   levalbuterol (XOPENEX) inhalation solution 1 25 mg (1 25 mg Nebulization Given 5/16/22 1544)   ipratropium (ATROVENT) 0 02 % inhalation solution 0 5 mg (0 5 mg Nebulization Given 5/16/22 1544)   furosemide (LASIX) injection 40 mg (40 mg Intravenous Given 5/16/22 0906)   furosemide (LASIX) injection 40 mg (40 mg Intravenous Given 5/16/22 1413)       Diagnostic Studies  Results Reviewed     Procedure Component Value Units Date/Time    Blood culture [[id number]] Collected: 05/16/22 1215    Lab Status: Preliminary result Specimen: Blood from Arm, Left Updated: 05/16/22 1504     Blood Culture Received in Microbiology Lab  Culture in Progress  Blood culture [[id number]] Collected: 05/16/22 1215    Lab Status: Preliminary result Specimen: Blood from Arm, Right Updated: 05/16/22 1504     Blood Culture Received in Microbiology Lab  Culture in Progress  HS Troponin I 4hr [[id number]]  (Normal) Collected: 05/16/22 1309    Lab Status: Final result Specimen: Blood from Arm, Right Updated: 05/16/22 1338     hs TnI 4hr 27 ng/L      Delta 4hr hsTnI 12 ng/L     Fingerstick Glucose (POCT) [[id number]]  (Abnormal) Collected: 05/16/22 1310    Lab Status: Final result Updated: 05/16/22 1312     POC Glucose 332 mg/dl     COVID/FLU/RSV [[id number]]  (Normal) Collected: 05/16/22 1125    Lab Status: Final result Specimen: Nares from Nose Updated: 05/16/22 1217     SARS-CoV-2 Negative     INFLUENZA A PCR Negative     INFLUENZA B PCR Negative     RSV PCR Negative    Narrative:      FOR PEDIATRIC PATIENTS - copy/paste COVID Guidelines URL to browser: [email or web address]    SARS-CoV-2 assay is a Nucleic Acid Amplification assay intended for the  qualitative detection of nucleic acid from SARS-CoV-2 in nasopharyngeal  swabs   Results are for the presumptive identification of SARS-CoV-2 RNA     Positive results are indicative of infection with SARS-CoV-2, the virus  causing COVID-19, but do not rule out bacterial infection or co-infection  with other viruses  Laboratories within the [place] and its  territories are required to report all positive results to the appropriate  public health authorities  Negative results do not preclude SARS-CoV-2  infection and should not be used as the sole basis for treatment or other  patient management decisions  Negative results must be combined with  clinical observations, patient history, and epidemiological information  This test has not been FDA cleared or approved  This test has been authorized by FDA under an Emergency Use Authorization  (EUA)  This test is only authorized for the duration of time the  declaration that circumstances exist justifying the authorization of the  emergency use of an in vitro diagnostic tests for detection of SARS-CoV-2  virus and/or diagnosis of COVID-19 infection under section 564(b)(1) of  the Act, 21 U  S C  563NJB-5(V)(3), unless the authorization is terminated  or revoked sooner  The test has been validated but independent review by FDA  and CLIA is pending  Test performed using [employer] GeneXpert: This RT-PCR assay targets N2,  a region unique to SARS-CoV-2  A conserved region in the E-gene was chosen  for pan-Sarbecovirus detection which includes SARS-CoV-2      Procalcitonin [[id number]]  (Normal) Collected: 05/16/22 0901    Lab Status: Final result Specimen: Blood from Arm, Right Updated: 05/16/22 1216     Procalcitonin 0 07 ng/ml     HS Troponin I 2hr [[id number]]  (Normal) Collected: 05/16/22 1125    Lab Status: Final result Specimen: Blood from Arm, Right Updated: 05/16/22 1205     hs TnI 2hr 23 ng/L      Delta 2hr hsTnI 8 ng/L     MRSA culture [[id number]]     Lab Status: No result Specimen: Nares     Blood gas, venous [[id number]]  (Abnormal) Collected: 05/16/22 1055    Lab Status: Final result Specimen: Blood from Arm, Left Updated: 05/16/22 1105     pH, Pillo 7 155     pCO2, Pillo 95 8 mm Hg      pO2, Pillo 68 8 mm Hg      HCO3, Pillo 33 0 mmol/L      Base Excess, Pillo 1 9 mmol/L      O2 Content, Pillo 14 6 ml/dL      O2 HGB, VENOUS 89 1 %     HS Troponin 0hr (reflex protocol) [[id number]]  (Normal) Collected: 05/16/22 0901    Lab Status: Final result Specimen: Blood from Arm, Right Updated: 05/16/22 0942     hs TnI 0hr 15 ng/L     NT-BNP PRO [[id number]]  (Abnormal) Collected: 05/16/22 0901    Lab Status: Final result Specimen: Blood from Arm, Right Updated: 05/16/22 0941     NT-proBNP 3,158 pg/mL     Comprehensive metabolic panel [[id number]]  (Abnormal) Collected: 05/16/22 0901    Lab Status: Final result Specimen: Blood from Arm, Right Updated: 05/16/22 0933     Sodium 143 mmol/L      Potassium 5 2 mmol/L      Chloride 102 mmol/L      CO2 37 mmol/L      ANION GAP 4 mmol/L      BUN 41 mg/dL      Creatinine 2 13 mg/dL      Glucose 334 mg/dL      Calcium 8 6 mg/dL      Corrected Calcium 9 2 mg/dL      AST 19 U/L      ALT 18 U/L      Alkaline Phosphatase 84 U/L      Total Protein 8 3 g/dL      Albumin 3 3 g/dL      Total Bilirubin 0 15 mg/dL      eGFR 23 ml/min/1 73sq m     Narrative:      [facility] [place] by [facility] guidelines for Chronic Kidney Disease (CKD):     Stage 1 with normal or high GFR (GFR > 90 mL/min/1 73 square meters)    Stage 2 Mild CKD (GFR = 60-89 mL/min/1 73 square meters)    Stage 3A Moderate CKD (GFR = 45-59 mL/min/1 73 square meters)    Stage 3B Moderate CKD (GFR = 30-44 mL/min/1 73 square meters)    Stage 4 Severe CKD (GFR = 15-29 mL/min/1 73 square meters)    Stage 5 End Stage CKD (GFR <15 mL/min/1 73 square meters)  Note: GFR calculation is accurate only with a steady state creatinine    CBC and differential [[id number]]  (Abnormal) Collected: 05/16/22 0901    Lab Status: Final result Specimen: Blood from Arm, Right Updated: 05/16/22 0929     WBC 16 86 Thousand/uL      RBC 3 76 Million/uL Hemoglobin 10 5 g/dL      Hematocrit 37 2 %      MCV 99 fL      MCH 27 9 pg      MCHC 28 2 g/dL      RDW 14 4 %      MPV 10 9 fL      Platelets 304 Thousands/uL      nRBC 0 /100 WBCs      Neutrophils Relative 82 %      Immat GRANS % 2 %      Lymphocytes Relative 9 %      Monocytes Relative 6 %      Eosinophils Relative 0 %      Basophils Relative 1 %      Neutrophils Absolute 13 90 Thousands/µL      Immature Grans Absolute 0 26 Thousand/uL      Lymphocytes Absolute 1 47 Thousands/µL      Monocytes Absolute 1 08 Thousand/µL      Eosinophils Absolute 0 05 Thousand/µL      Basophils Absolute 0 10 Thousands/µL     Blood gas, venous [[id number]]  (Abnormal) Collected: 05/16/22 0901    Lab Status: Final result Specimen: Blood from Arm, Right Updated: 05/16/22 0920     pH, Pillo 7 094     pCO2, Pillo 127 5 mm Hg      pO2, Pillo 55 6 mm Hg      HCO3, Pillo 38 2 mmol/L      Base Excess, Pillo 4 8 mmol/L      O2 Content, Pillo 13 6 ml/dL      O2 HGB, VENOUS 80 4 %     Urinalysis with microscopic [[id number]]     Lab Status: No result Specimen: Urine                  CT head without contrast   Final Result by Eladio Gilbert MD (05/16 1223)      1  No acute intracranial abnormality  2   New left mastoid effusion  Correlation for acute mastoiditis recommended                    Workstation performed: [id number]         XR chest 1 view portable   Final Result by Randy Cope MD (40/18 1113)      Mild cardiomegaly      Mild increased vascular congestion      Increased bilateral pleural effusions                  Workstation performed: [id number]                    Procedures  CriticalCare Time  Performed by: Brock Silveira MD  Authorized by: Brock Silveira MD     Critical care provider statement:     Critical care time (minutes):  35    Critical care was necessary to treat or prevent imminent or life-threatening deterioration of the following conditions:  Respiratory failure    Critical care was time spent personally by me on the following activities:  Interpretation of cardiac output measurements, ordering and performing treatments and interventions, obtaining history from patient or surrogate, development of treatment plan with patient or surrogate, ordering and review of laboratory studies, discussions with consultants, evaluation of patient's response to treatment, examination of patient, review of old charts, re-evaluation of patient's condition and ordering and review of radiographic studies             ED Course                                             MDM  Number of Diagnoses or Management Options  CHF exacerbation ([facility] 75 )  Hypercapnia  Respiratory distress  Diagnosis management comments: In respiratory distress with end-tidal CO2 in the 70s per EMS report, increasing oxygen requirement  Plan for cardiac workup including CBC, CMP, troponin, BNP, EKG, chest x-ray  Early diuresis with 40 of IV Lasix  Transition to BiPAP for probable hypercapnia and respiratory distress  Patient with improvement of mental status, repeat VBG 90 from 01/20  Chest x-ray with findings concerning for pulmonary edema and pleural effusions, BNP of 3000 without additional lab abnormalities  Patient was initially in atrial fibrillation with rapid ventricular response however converted to sinus rhythm rate in the 90s on reassessment  Patient admitted to ICU level 1 step-down for further management in stable condition        Disposition  Final diagnoses:   Respiratory distress   CHF exacerbation ([facility] 75 )   Hypercapnia     Time reflects when diagnosis was documented in both MDM as applicable and the Disposition within this note     Time User Action Codes Description Comment    5/16/2022 11:19 AM Nazareth Madi Add [R06 03] Respiratory distress     5/16/2022 11:20 AM Nazareth Madi Add [I50 9] CHF exacerbation ([facility] 75 )     5/16/2022 11:20 AM Nazareth Madi Add [R06 89] Hypercapnia     5/16/2022 11:41 AM Josem Clock Add [J96 22] Acute on chronic respiratory failure with hypercapnia ([facility] )     5/16/2022 11:41 AM Spring Batista Add [I48 91] Atrial fibrillation with rapid ventricular response ([facility] 75 )     5/16/2022 11:41 AM Spring Batista Add [E66 01] Morbid obesity [facility])       ED Disposition     ED Disposition   Admit    Condition   Stable    Date/Time   Mon May 16, 2022 11:19 AM    Comment   Case was discussed with critical care and the patient's admission status was agreed to be Admission Status: inpatient status to the service of Dr Heather Acosta   Follow-up Information    None         Current Discharge Medication List      CONTINUE these medications which have NOT CHANGED    Details   acetaminophen (TYLENOL) 325 mg tablet Take 650 mg by mouth every 6 (six) hours as needed for mild pain        albuterol (PROVENTIL HFA,VENTOLIN HFA) 90 mcg/act inhaler Inhale 2 puffs every 6 (six) hours as needed for wheezing  Qty: 8 g, Refills: 1    Comments: Substitution to a formulary equivalent within the same pharmaceutical class is authorized  Associated Diagnoses: Mild asthma without complication, unspecified whether persistent      ammonium lactate (LAC-HYDRIN) 12 % lotion APPLY TOPICALLY TO AFFECTED AREAS twice a day      apixaban (ELIQUIS) 5 mg Take 1 tablet (5 mg total) by mouth 2 (two) times a day  Qty: 56 tablet, Refills: 0    Associated Diagnoses: CHF (congestive heart failure) ([facility] ); Paroxysmal atrial fibrillation ([facility] 75 ); Diabetes mellitus type 2 in nonobese (HCC)      b complex-vitamin C-folic acid (NEPHROCAPS) 1 mg capsule Take 1 capsule by mouth daily  Qty: 30 capsule, Refills: 5    Associated Diagnoses: Anemia, unspecified type      docusate sodium (COLACE) 100 mg capsule Take 100 mg by mouth in the morning      glucose blood (OneTouch Ultra) test strip Test twice daily    Qty: 100 strip, Refills: 2    Comments: New Fax [phone]  Associated Diagnoses: Type 2 diabetes mellitus with hyperosmolarity without coma, without long-term current use of insulin ([facility])      Insulin Pen Needle (NovoFine Autocover) 30G X 8 MM MISC Inject under the skin daily  Qty: 100 each, Refills: 3    Associated Diagnoses: Type 2 diabetes mellitus with hyperosmolarity without coma, without long-term current use of insulin ([facility])      Lancets (onetouch ultrasoft) lancets Use once daily  Qty: 100 each, Refills: 2    Comments: New FAX number [phone]  Associated Diagnoses: Type 2 diabetes mellitus with hyperosmolarity without coma, without long-term current use of insulin ([facility])      lidocaine (LIDODERM) 5 % Apply 2 patches topically daily Remove & Discard patch within 12 hours or as directed by MD  Refills: 0    Associated Diagnoses: Closed fracture of head of left humerus, initial encounter      metoprolol tartrate (LOPRESSOR) 25 mg tablet Take 1 tablet (25 mg total) by mouth every 12 (twelve) hours  Qty: 60 tablet, Refills: 3    Associated Diagnoses: CHF (congestive heart failure) ([facility] ); Paroxysmal atrial fibrillation ([facility] ); Diabetes mellitus type 2 in nonobese ([facility])      montelukast (SINGULAIR) 10 mg tablet Take 10 mg by mouth daily        nystatin (MYCOSTATIN) powder Apply topically 2 (two) times a day      pregabalin (LYRICA) 100 mg capsule Take 100 mg by mouth 2 (two) times a day       rosuvastatin (CRESTOR) 10 MG tablet 10 mg daily    Refills: 0      sulfamethoxazole-trimethoprim (BACTRIM DS) 800-160 mg per tablet Take 1 tablet by mouth in the morning and 1 tablet in the evening  Do all this for 7 days  smx-tmp DS (BACTRIM) 800-160 mg tabs (1tab q12 D10)    Qty: 14 tablet, Refills: 0    Associated Diagnoses: UTI (urinary tract infection)      torsemide (DEMADEX) 10 mg tablet Take 1 tablet (10 mg total) by mouth daily  Qty: 30 tablet, Refills: 0    Associated Diagnoses: Acute diastolic congestive heart failure ([facility])      Toujeo SoloStar 300 units/mL CONCENTRATED U-300 injection pen (1-unit dial) Inject 35 Units under the skin daily at bedtime  Qty: 4 5 mL, Refills: 4    Associated Diagnoses: Type 2 diabetes mellitus with hyperosmolarity without coma, without long-term current use of insulin (HCC)      Trulicity 1 5 RZ/2 7EL SOPN inject 0 5 milliliter subcutaneously every week 28             No discharge procedures on file      PDMP Review       Value Time User    PDMP Reviewed  Yes 3/12/2022 10:25 AM RK Christina          ED Provider  Electronically Signed by           Luci Warren MD  05/16/22 [id number]

## 2022-05-16 NOTE — ASSESSMENT & PLAN NOTE
Wt Readings from Last 3 Encounters:   05/16/22 122 kg (268 lb 15 4 oz)   05/13/22 116 kg (255 lb 8 2 oz)   04/28/22 121 kg (267 lb)     · Patient presented SOB in ED received IV lasix 40 mg IV in ED  · Echocardiogram 50% ejection fraction 03/10/2022  · CXR shows mild vascular congestion along with increasing pleural effusions  · ProBNP 3158  · Daily weights  · Strict I's and O'S

## 2022-05-16 NOTE — ASSESSMENT & PLAN NOTE
· Presented to ED for evaluation of multiple complaints including chest pain, shortness of breath, increased work of breathing, somnolence and change in mental status  · Suspect 2/2 decompensated CHF  · Initial Admission ABG  7 094/127 5/55 6/38 2  · VBG improved to ph 7 386 and PC02 56  · CXR-Mild cardiomegaly, Mild increased vascular congestion, Increased bilateral pleural effusions  ·  BNP 3,158, BL as per chart review baseline 3500  · Trop (-)  · WBC 16 86-->8  26- procal 1 15, strep (-), legionella (-), Blood cx x2, and MRSA pending  · Received 40 mg IV lasix in ED and one dose given in ICU- consider restarting home demadex  ·  monitor I's and O's  · Placed on Bipap in ED and continued- patient transitioned to baseline 2L NC and satting in 90's (BL)

## 2022-05-16 NOTE — ASSESSMENT & PLAN NOTE
Lab Results   Component Value Date    HGBA1C 6 8 (H) 03/09/2022       · Patient is on lantus 35 units qhs- will hold   · Continue Humalog sliding scale

## 2022-05-16 NOTE — ASSESSMENT & PLAN NOTE
· Patient has known history of paroxysmal l atrial fibrillation   · EKG in ED showed patient to have Atrial fibrillation with rapid ventricular response  · Continue Lopressor 25 p o  B i d   And anticoagulation with Eliquis  · Patient follows up with Dr CORONA

## 2022-05-16 NOTE — PLAN OF CARE
Problem: RESPIRATORY - ADULT  Goal: Achieves optimal ventilation and oxygenation  Description: INTERVENTIONS:  - Assess for changes in respiratory status  - Assess for changes in mentation and behavior  - Position to facilitate oxygenation and minimize respiratory effort  - Oxygen administered by appropriate delivery if ordered  - Initiate smoking cessation education as indicated  - Encourage broncho-pulmonary hygiene including cough, deep breathe, Incentive Spirometry  - Assess the need for suctioning and aspirate as needed  - Assess and instruct to report SOB or any respiratory difficulty  - Respiratory Therapy support as indicated  Outcome: Progressing         Patient transported to ICU without incident  VSS   Report given to unit therapist

## 2022-05-16 NOTE — PLAN OF CARE
Problem: RESPIRATORY - ADULT  Goal: Achieves optimal ventilation and oxygenation  Description: INTERVENTIONS:  - Assess for changes in respiratory status  - Assess for changes in mentation and behavior  - Position to facilitate oxygenation and minimize respiratory effort  - Oxygen administered by appropriate delivery if ordered  - Initiate smoking cessation education as indicated  - Encourage broncho-pulmonary hygiene including cough, deep breathe, Incentive Spirometry  - Assess the need for suctioning and aspirate as needed  - Assess and instruct to report SOB or any respiratory difficulty  - Respiratory Therapy support as indicated  Outcome: Progressing     Problem: MOBILITY - ADULT  Goal: Maintain or return to baseline ADL function  Description: INTERVENTIONS:  -  Assess patient's ability to carry out ADLs; assess patient's baseline for ADL function and identify physical deficits which impact ability to perform ADLs (bathing, care of mouth/teeth, toileting, grooming, dressing, etc )  - Assess/evaluate cause of self-care deficits   - Assess range of motion  - Assess patient's mobility; develop plan if impaired  - Assess patient's need for assistive devices and provide as appropriate  - Encourage maximum independence but intervene and supervise when necessary  - Involve family in performance of ADLs  - Assess for home care needs following discharge   - Consider OT consult to assist with ADL evaluation and planning for discharge  - Provide patient education as appropriate  Outcome: Progressing  Goal: Maintains/Returns to pre admission functional level  Description: INTERVENTIONS:  - Perform BMAT or MOVE assessment daily    - Set and communicate daily mobility goal to care team and patient/family/caregiver  - Collaborate with rehabilitation services on mobility goals if consulted  - Perform Range of Motion 2 times a day  - Reposition patient every 2 hours    - Record patient progress and toleration of activity level   Outcome: Progressing     Problem: Potential for Falls  Goal: Patient will remain free of falls  Description: INTERVENTIONS:  - Educate patient/family on patient safety including physical limitations  - Instruct patient to call for assistance with activity   - Consult OT/PT to assist with strengthening/mobility   - Keep Call bell within reach  - Keep bed low and locked with side rails adjusted as appropriate  - Keep care items and personal belongings within reach  - Initiate and maintain comfort rounds  - Make Fall Risk Sign visible to staff  - Offer Toileting every 2 Hours, in advance of need  - Initiate/Maintain bed alarm  - Apply yellow socks and bracelet for high fall risk patients  - Consider moving patient to room near nurses station  Outcome: Progressing

## 2022-05-16 NOTE — ASSESSMENT & PLAN NOTE
· Chronic bilateral lower extremity edema  · Given SEBASTIAN, will hold torsemide  · Daily weights, monitor intake and output

## 2022-05-16 NOTE — H&P
History and Physical - Critical Care  Kaylin Patel 73 y o  female MRN: [id number]  Unit/Bed#: ED CT2 Encounter: [id number]            Acute on chronic respiratory failure with hypercapnia (HCC)  Assessment & Plan  · Presented to ED for evaluation of multiple complaints including chest pain, shortness of breath, increased work of breathing, somnolence and change in mental status  · Suspect 2/2 decompensated CHF  · Initial Admission ABG  7 094/127 5/55 6/38 2  · CXR-Mild cardiomegaly, Mild increased vascular congestion, Increased bilateral pleural effusions  ·  BNP 3,158, BL as per chart review baseline 3500  · Trop (-)  · WBC 16 86- procal, strep, legionella, Blood cx x2, and MRSA pending  · Received 40 mg IV lasix in ED   · Will increase lasix as needed, monitor I's and O's  · Placed on Bipap in ED and will continue- current settings 12/5  · BiPap use as acidosis has not resolved  · Titrate FiO2 to goal SpO2 >90%       UTI (urinary tract infection)  Assessment & Plan  · Patient was seen on 5/13 in the ED and found to have UTI treated with Bactrim (will hold)  · UA and urine microscopic  · Start Ceftriaxone 1000 mg IV Q24H      Morbid obesity (HCC)  Assessment & Plan  · BMI 49 19    Chronic diastolic (congestive) heart failure (HCC)  Assessment & Plan  Wt Readings from Last 3 Encounters:   05/16/22 122 kg (268 lb 15 4 oz)   05/13/22 116 kg (255 lb 8 2 oz)   04/28/22 121 kg (267 lb)     · Patient presented SOB in ED received IV lasix 40 mg IV in ED  · Echocardiogram 50% ejection fraction 03/10/2022  · CXR shows mild vascular congestion along with increasing pleural effusions  · ProBNP 3158  · Daily weights  · Strict I's and O'S          Atrial fibrillation with rapid ventricular response ([facility] )  Assessment & Plan  · On presentation patient was SOB with HR between 115-140, irregularly irregular rhythmn  · Continue Lopressor 25 BID  · Telemetry  · Optimize electrolytes    Stage 3 chronic kidney disease ([facility] )  Assessment & Plan  Lab Results   Component Value Date    EGFR 23 05/16/2022    EGFR 35 05/13/2022    EGFR 50 04/12/2022    CREATININE 2 13 (H) 05/16/2022    CREATININE 1 49 (H) 05/13/2022    CREATININE 1 12 04/12/2022     · BL 14 -1 6  · Avoid nephrotoxic agents  · Will hold Demadex 10 mg daily    Status post reverse total replacement of left shoulder  Assessment & Plan  · Patient suffered from fall resulting in Comminuted displaced fracture of the proximal humerus (2/23/22)  · S/p left shoulder reverse complete arthroplasty (3/1/22)       Diabetes mellitus, type 2 ([facility] )  Assessment & Plan    Lab Results   Component Value Date    HGBA1C 6 8 (H) 03/09/2022       · Patient is on lantus 35 units qhs- will hold as patient is NPO status  · Continue Humalog sliding scale      SEBASTIAN (acute kidney injury) ([facility] )  Assessment & Plan  History of CKD stage 3  Cr 2 13  Baseline creatinine around 1 2-1 4  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  Avoid nephrotoxic agents and hypotension    Essential hypertension  Assessment & Plan  · Continue Lopressor 25 BID  · Patient is on Demadex 10 mg daily- will hold      Mixed hyperlipidemia  Assessment & Plan  · Continue statin    Paroxysmal atrial fibrillation ([facility] )  Assessment & Plan  · Patient has known history of paroxysmal l atrial fibrillation   · EKG in ED showed patient to have Atrial fibrillation with rapid ventricular response  · Continue Lopressor 25 p o  B i d   And anticoagulation with Eliquis  · Patient follows up with Dr Mary Ann Kelly leg edema  Assessment & Plan  · Chronic bilateral lower extremity edema  · Given SEBASTIAN, will hold torsemide  · Daily weights, monitor intake and output    Asthma  Assessment & Plan  · Continue albuterol inhaler                  History of Present Illness:  Rebecca Martinez is a 73 y o  female who presents for [facility]  79-year-old female history of diastolic heart failure, paroxysmal atrial fibrillation anticoagulated on Eliquis , diabetes presenting with SOB and lethargy  Patient's daughter provided history  She states that patient was found by granddaughter in the bathroom somnolent and lethargic  She was seen in the ED on 5/13 for SOB and rapid heart rate and also treated outpatient for UTI with Bactrim  Patient poor historian secondary to mental status  She was placed on Bipap in the ED  Patient denies chest pain, headache, nausea, and blurry vision  Patient is on baseline 2L NC at home and was noted to be satting in the low 80s at home            Past Medical History:  Past Medical History:   Diagnosis Date    Anemia     Asthma     COVID-19 January 2022    GERD (gastroesophageal reflux disease)     Hyperlipidemia     Kidney stones     PONV (postoperative nausea and vomiting)     SVT (supraventricular tachycardia) (HCC)         Past Surgical History:  Past Surgical History:   Procedure Laterality Date    APPENDECTOMY      CYSTOSCOPY W/ LASER LITHOTRIPSY Left 7/12/2016    Procedure: CYSTOSCOPY URETEROSCOPY WITH LITHOTRIPSY HOLMIUM LASER, RETROGRADE PYELOGRAM AND INSERTION STENT URETERAL;  Surgeon: Griselda Albe, MD;  Location: [address] [place] [address];  Service:     DILATION AND CURETTAGE OF UTERUS      IR THORACENTESIS  3/18/2022    JOINT REPLACEMENT      right knee    KNEE ARTHROPLASTY Right     TN CYSTOURETHROSCOPY,URETER CATHETER Left 6/29/2016    Procedure: CYSTOSCOPY RETROGRADE PYELOGRAM WITH INSERTION STENT URETERAL, left;  Surgeon: Griselda Albe, MD;  Location: [facility];  Service: Urology    TN RECONSTR TOTAL SHOULDER IMPLANT Left 3/1/2022    Procedure: ARTHROPLASTY SHOULDER REVERSE;  Surgeon: Praveen Benites MD;  Location: [facility];  Service: Orthopedics    SHOULDER ARTHROTOMY Left         Past Family History:  Family History   Problem Relation Age of Onset    Heart disease Mother     Emphysema Maternal Grandmother     Heart disease Family         Social History:  E-Cigarette/Vaping    E-Cigarette Use Never User      E-Cigarette/Vaping Substances     Social History     Tobacco Use   Smoking Status Former Smoker    Packs/day: 1 00    Years: 20 00    Pack years: 20     Types: Cigarettes    Quit date: 1996    Years since quittin 8   Smokeless Tobacco Never Used     Social History     Substance and Sexual Activity   Alcohol Use Not Currently    Comment: rarely     Social History     Substance and Sexual Activity   Drug Use No     Marital Status: Single       Medications:  Current Facility-Administered Medications   Medication Dose Route Frequency    albuterol (PROVENTIL HFA,VENTOLIN HFA) inhaler 2 puff  2 puff Inhalation Q6H PRN    apixaban (ELIQUIS) tablet 5 mg  5 mg Oral BID    cefTRIAXone (ROCEPHIN) IVPB (premix in dextrose) 1,000 mg 50 mL  1,000 mg Intravenous Q24H    docusate sodium (COLACE) capsule 100 mg  100 mg Oral Daily    insulin lispro (HumaLOG) 100 units/mL subcutaneous injection 2-12 Units  2-12 Units Subcutaneous Q6H [address]    metoprolol tartrate (LOPRESSOR) tablet 25 mg  25 mg Oral Q12H [address]    nystatin (MYCOSTATIN) powder   Topical BID    pravastatin (PRAVACHOL) tablet 80 mg  80 mg Oral Daily With Dinner     Home medications:  Prior to Admission medications    Medication Sig Start Date End Date Taking?  Authorizing Provider   acetaminophen (TYLENOL) 325 mg tablet Take 650 mg by mouth every 6 (six) hours as needed for mild pain      Historical Provider, MD   albuterol (PROVENTIL HFA,VENTOLIN HFA) 90 mcg/act inhaler Inhale 2 puffs every 6 (six) hours as needed for wheezing 3/25/22   Darian Dumas MD   ammonium lactate (LAC-HYDRIN) 12 % lotion APPLY TOPICALLY TO AFFECTED AREAS twice a day 21   Historical Provider, MD   apixaban (ELIQUIS) 5 mg Take 1 tablet (5 mg total) by mouth 2 (two) times a day 22   Eli Stiles MD   b complex-vitamin C-folic acid (NEPHROCAPS) 1 mg capsule Take 1 capsule by mouth daily 5/3/22 6/2/22  Trevor Vásquez MD   docusate sodium (COLACE) 100 mg capsule Take 100 mg by mouth in the morning    Historical Provider, MD   glucose blood (OneTouch Ultra) test strip Test twice daily  4/8/22   Trevor Vásquez MD   Insulin Pen Needle (NovoFine Autocover) 30G X 8 MM MISC Inject under the skin daily 4/22/22   Trevor Vásquez MD   Lancets (onetouch ultrasoft) lancets Use once daily 4/8/22   Trevor Vásquez MD   lidocaine (LIDODERM) 5 % Apply 2 patches topically daily Remove & Discard patch within 12 hours or as directed by MD 2/21/22   Jared Grimes,    metoprolol tartrate (LOPRESSOR) 25 mg tablet Take 1 tablet (25 mg total) by mouth every 12 (twelve) hours 5/3/22   Guido Hadley MD   montelukast (SINGULAIR) 10 mg tablet Take 10 mg by mouth daily      Historical Provider, MD   nystatin (MYCOSTATIN) powder Apply topically 2 (two) times a day    Historical Provider, MD   pregabalin (LYRICA) 100 mg capsule Take 100 mg by mouth 2 (two) times a day     Historical Provider, MD   rosuvastatin (CRESTOR) 10 MG tablet 10 mg daily   8/29/19   Historical Provider, MD   sulfamethoxazole-trimethoprim (BACTRIM DS) 800-160 mg per tablet Take 1 tablet by mouth in the morning and 1 tablet in the evening  Do all this for 7 days  smx-tmp DS (BACTRIM) 800-160 mg tabs (1tab q12 D10)  5/13/22 5/20/22  Tremayne Cooley MD   torsemide [facility]) 10 mg tablet Take 1 tablet (10 mg total) by mouth daily 3/23/22 4/28/22  MICHELLE Matute 300 units/mL CONCENTRATED U-300 injection pen (1-unit dial) Inject 35 Units under the skin daily at bedtime 4/22/22   Trevor Vásquez MD   Trulicity 1 5 JN/7 7IS SOPN inject 0 5 milliliter subcutaneously every week 28 12/16/21   Historical Provider, MD     Allergies:   Allergies   Allergen Reactions    Penicillins Hives    Moxifloxacin Other (See Comments)     unknown    Percocet [Oxycodone-Acetaminophen] Other (See Comments)     unknown  Zinc Acetate Other (See Comments)     unknown    Asa [Aspirin] GI Intolerance    Indocin [Indomethacin] Other (See Comments)     Made patient "loopy"    Other Other (See Comments)     unknown        ROS:   Review of Systems   Constitutional: Positive for activity change and fatigue  Negative for chills and fever  HENT: Negative for congestion  Respiratory: Positive for shortness of breath  Negative for cough and wheezing  Cardiovascular: Positive for palpitations and leg swelling  Negative for chest pain  Gastrointestinal: Negative for abdominal pain, constipation, diarrhea, nausea and vomiting  Musculoskeletal: Negative for back pain  Neurological: Negative for light-headedness and headaches  Psychiatric/Behavioral: Positive for confusion  Vitals:  Vitals:    22 0930 22 1000 22 1100 22 1130   BP: 121/57 133/63 164/69 (!) 176/74   BP Location:       Pulse: (!) 119 93 88 92   Resp: 17 (!) 25 (!) 24 (!) 45   Temp:       TempSrc:       SpO2: 100% 100% 100% (!) 87%   Weight:         Temperature:   Temp (24hrs), Av 8 °F (35 4 °C), Min:95 8 °F (35 4 °C), Max:95 8 °F (35 4 °C)    Current: Temperature: (!) 95 8 °F (35 4 °C)     Weights:      Body mass index is 49 19 kg/m²  Non-Invasive/Invasive Ventilation Settings:  Respiratory  Report   Lab Data (Last 4 hours)    None         O2/Vent Data (Last 4 hours)       0904          Non-Invasive Ventilation Mode BiPAP                 No results found for: PHART, BJQ1RHY, PO2ART, DTQ0PNC, R9LXFCES, BEART, SOURCE  SpO2 Activity: SpO2 Activity: At Rest     Physical Exam:  Physical Exam  Constitutional:       Appearance: She is obese  She is ill-appearing  HENT:      Head: Normocephalic and atraumatic  Cardiovascular:      Rate and Rhythm: Normal rate  Rhythm irregular  Pulses: Normal pulses  Heart sounds: Normal heart sounds  Pulmonary:      Effort: Respiratory distress (mild) present        Breath sounds: Normal breath sounds  Decreased air movement present  No wheezing, rhonchi or rales  Comments: On bipap    Abdominal:      General: Bowel sounds are normal  There is no distension  Tenderness: There is no abdominal tenderness  Musculoskeletal:      Right lower leg: Edema present  Left lower leg: Edema present  Comments: Chronic bilateral LE edema   Neurological:      Mental Status: She is oriented to person, place, and time  Comments: Lethargic            Labs:  Results from last 7 days   Lab Units 05/16/22  0901 05/13/22  0027   WBC Thousand/uL 16 86* 8 30   HEMOGLOBIN g/dL 10 5* 9 2*   HEMATOCRIT % 37 2 31 4*   PLATELETS Thousands/uL 243 174   NEUTROS PCT % 82* 58   MONOS PCT % 6 10      Results from last 7 days   Lab Units 05/16/22  0901 05/13/22  0027   SODIUM mmol/L 143 141   POTASSIUM mmol/L 5 2 4 4   CHLORIDE mmol/L 102 99*   CO2 mmol/L 37* 37*   BUN mg/dL 41* 36*   CREATININE mg/dL 2 13* 1 49*   CALCIUM mg/dL 8 6 8 0*   ALK PHOS U/L 84 91   ALT U/L 18 15   AST U/L 19 10     Results from last 7 days   Lab Units 05/13/22  0027   MAGNESIUM mg/dL 1 8          Results from last 7 days   Lab Units 05/13/22  0027   INR  1 18   PTT seconds 33         0   Lab Value Date/Time    TROPONINI <0 02 07/29/2019 1519    TROPONINI <0 02 06/25/2019 1657    TROPONINI <0 02 05/25/2019 1442    TROPONINI <0 02 03/08/2018 1231        Imaging:  XR chest 1 view portable   Final Result      Mild cardiomegaly      Mild increased vascular congestion      Increased bilateral pleural effusions                  Workstation performed: [id number]         CT head without contrast    (Results Pending)       EKG:   Micro:  Lab Results   Component Value Date    BLOODCX No Growth After 5 Days  03/28/2022    BLOODCX No Growth After 5 Days  03/27/2022    BLOODCX No Growth After 5 Days   03/09/2022    URINECX 50,000-59,000 cfu/ml  05/13/2022    URINECX 50,000-59,000 cfu/ml  04/12/2022    URINECX 50,000-59,000 cfu/ml  03/28/2022            VTE Pharmacologic Prophylaxis: Reason for no pharmacologic prophylaxis Patient is on Elliquis  VTE Mechanical Prophylaxis: sequential compression device     Invasive lines and devices: Invasive Devices  Report    Peripheral Intravenous Line  Duration           Peripheral IV 05/16/22 Right Antecubital <1 day    Peripheral IV 05/16/22 Right Antecubital <1 day                 Code Status: Level 1 - Full Code  POA:    POLST:       Given critical illness, patient length of stay will require greater than two midnights  Counseling / Coordination of Care  Total Critical Care time spent 30 minutes excluding procedures, teaching and family updates  Portions of the record may have been created with voice recognition software  Occasional wrong word or "sound a like" substitutions may have occurred due to the inherent limitations of voice recognition software  Read the chart carefully and recognize, using context, where substitutions have occurred          Ish Palmer MD

## 2022-05-16 NOTE — ASSESSMENT & PLAN NOTE
Lab Results   Component Value Date    EGFR 23 05/16/2022    EGFR 35 05/13/2022    EGFR 50 04/12/2022    CREATININE 2 13 (H) 05/16/2022    CREATININE 1 49 (H) 05/13/2022    CREATININE 1 12 04/12/2022     · BL 14 -1 6  · Avoid nephrotoxic agents  · Will hold Demadex 10 mg daily

## 2022-05-17 PROBLEM — J96.01 ACUTE RESPIRATORY FAILURE WITH HYPOXIA (HCC): Status: RESOLVED | Noted: 2022-03-22 | Resolved: 2022-05-17

## 2022-05-17 PROBLEM — G93.40 ACUTE ENCEPHALOPATHY: Status: RESOLVED | Noted: 2022-05-16 | Resolved: 2022-05-17

## 2022-05-17 PROBLEM — K59.00 CONSTIPATION: Status: RESOLVED | Noted: 2022-03-04 | Resolved: 2022-05-17

## 2022-05-17 PROBLEM — E66.2 OBESITY HYPOVENTILATION SYNDROME (HCC): Status: ACTIVE | Noted: 2022-05-17

## 2022-05-17 PROBLEM — A41.9 SEPSIS (HCC): Status: RESOLVED | Noted: 2022-03-28 | Resolved: 2022-05-17

## 2022-05-17 PROBLEM — R06.02 SHORTNESS OF BREATH: Status: RESOLVED | Noted: 2022-03-09 | Resolved: 2022-05-17

## 2022-05-17 LAB
ALBUMIN SERPL BCP-MCNC: 2.9 G/DL (ref 3.5–5)
ALP SERPL-CCNC: 59 U/L (ref 46–116)
ALT SERPL W P-5'-P-CCNC: 15 U/L (ref 12–78)
ANION GAP SERPL CALCULATED.3IONS-SCNC: 4 MMOL/L (ref 4–13)
AST SERPL W P-5'-P-CCNC: 13 U/L (ref 5–45)
BASOPHILS # BLD AUTO: 0.05 THOUSANDS/ΜL (ref 0–0.1)
BASOPHILS NFR BLD AUTO: 1 % (ref 0–1)
BILIRUB SERPL-MCNC: 0.22 MG/DL (ref 0.2–1)
BUN SERPL-MCNC: 46 MG/DL (ref 5–25)
CALCIUM ALBUM COR SERPL-MCNC: 9.5 MG/DL (ref 8.3–10.1)
CALCIUM SERPL-MCNC: 8.6 MG/DL (ref 8.3–10.1)
CHLORIDE SERPL-SCNC: 103 MMOL/L (ref 100–108)
CO2 SERPL-SCNC: 37 MMOL/L (ref 21–32)
CREAT SERPL-MCNC: 1.97 MG/DL (ref 0.6–1.3)
EOSINOPHIL # BLD AUTO: 0.14 THOUSAND/ΜL (ref 0–0.61)
EOSINOPHIL NFR BLD AUTO: 2 % (ref 0–6)
ERYTHROCYTE [DISTWIDTH] IN BLOOD BY AUTOMATED COUNT: 14.4 % (ref 11.6–15.1)
GFR SERPL CREATININE-BSD FRML MDRD: 25 ML/MIN/1.73SQ M
GLUCOSE SERPL-MCNC: 123 MG/DL (ref 65–140)
GLUCOSE SERPL-MCNC: 137 MG/DL (ref 65–140)
GLUCOSE SERPL-MCNC: 137 MG/DL (ref 65–140)
GLUCOSE SERPL-MCNC: 173 MG/DL (ref 65–140)
GLUCOSE SERPL-MCNC: 187 MG/DL (ref 65–140)
GLUCOSE SERPL-MCNC: 205 MG/DL (ref 65–140)
HCT VFR BLD AUTO: 29.4 % (ref 34.8–46.1)
HGB BLD-MCNC: 8.8 G/DL (ref 11.5–15.4)
IMM GRANULOCYTES # BLD AUTO: 0.02 THOUSAND/UL (ref 0–0.2)
IMM GRANULOCYTES NFR BLD AUTO: 0 % (ref 0–2)
L PNEUMO1 AG UR QL IA.RAPID: NEGATIVE
LYMPHOCYTES # BLD AUTO: 1.96 THOUSANDS/ΜL (ref 0.6–4.47)
LYMPHOCYTES NFR BLD AUTO: 24 % (ref 14–44)
MAGNESIUM SERPL-MCNC: 2.1 MG/DL (ref 1.6–2.6)
MCH RBC QN AUTO: 28.2 PG (ref 26.8–34.3)
MCHC RBC AUTO-ENTMCNC: 29.9 G/DL (ref 31.4–37.4)
MCV RBC AUTO: 94 FL (ref 82–98)
MONOCYTES # BLD AUTO: 0.81 THOUSAND/ΜL (ref 0.17–1.22)
MONOCYTES NFR BLD AUTO: 10 % (ref 4–12)
MRSA NOSE QL CULT: ABNORMAL
MRSA NOSE QL CULT: ABNORMAL
NEUTROPHILS # BLD AUTO: 5.28 THOUSANDS/ΜL (ref 1.85–7.62)
NEUTS SEG NFR BLD AUTO: 63 % (ref 43–75)
NRBC BLD AUTO-RTO: 0 /100 WBCS
NT-PROBNP SERPL-MCNC: 4994 PG/ML
PHOSPHATE SERPL-MCNC: 3.6 MG/DL (ref 2.3–4.1)
PLATELET # BLD AUTO: 181 THOUSANDS/UL (ref 149–390)
PMV BLD AUTO: 10.9 FL (ref 8.9–12.7)
POTASSIUM SERPL-SCNC: 5 MMOL/L (ref 3.5–5.3)
PROCALCITONIN SERPL-MCNC: 1.15 NG/ML
PROT SERPL-MCNC: 7 G/DL (ref 6.4–8.2)
RBC # BLD AUTO: 3.12 MILLION/UL (ref 3.81–5.12)
S PNEUM AG UR QL: NEGATIVE
SODIUM SERPL-SCNC: 144 MMOL/L (ref 136–145)
WBC # BLD AUTO: 8.26 THOUSAND/UL (ref 4.31–10.16)

## 2022-05-17 PROCEDURE — 82948 REAGENT STRIP/BLOOD GLUCOSE: CPT

## 2022-05-17 PROCEDURE — 84145 PROCALCITONIN (PCT): CPT

## 2022-05-17 PROCEDURE — 94760 N-INVAS EAR/PLS OXIMETRY 1: CPT

## 2022-05-17 PROCEDURE — 85025 COMPLETE CBC W/AUTO DIFF WBC: CPT | Performed by: STUDENT IN AN ORGANIZED HEALTH CARE EDUCATION/TRAINING PROGRAM

## 2022-05-17 PROCEDURE — 84100 ASSAY OF PHOSPHORUS: CPT | Performed by: STUDENT IN AN ORGANIZED HEALTH CARE EDUCATION/TRAINING PROGRAM

## 2022-05-17 PROCEDURE — 80053 COMPREHEN METABOLIC PANEL: CPT | Performed by: STUDENT IN AN ORGANIZED HEALTH CARE EDUCATION/TRAINING PROGRAM

## 2022-05-17 PROCEDURE — 94640 AIRWAY INHALATION TREATMENT: CPT

## 2022-05-17 PROCEDURE — 99233 SBSQ HOSP IP/OBS HIGH 50: CPT | Performed by: INTERNAL MEDICINE

## 2022-05-17 PROCEDURE — 83735 ASSAY OF MAGNESIUM: CPT | Performed by: STUDENT IN AN ORGANIZED HEALTH CARE EDUCATION/TRAINING PROGRAM

## 2022-05-17 PROCEDURE — 83880 ASSAY OF NATRIURETIC PEPTIDE: CPT | Performed by: STUDENT IN AN ORGANIZED HEALTH CARE EDUCATION/TRAINING PROGRAM

## 2022-05-17 RX ORDER — INSULIN LISPRO 100 [IU]/ML
2-12 INJECTION, SOLUTION INTRAVENOUS; SUBCUTANEOUS
Status: DISCONTINUED | OUTPATIENT
Start: 2022-05-17 | End: 2022-05-23 | Stop reason: HOSPADM

## 2022-05-17 RX ORDER — TORSEMIDE 20 MG/1
20 TABLET ORAL DAILY
Status: DISCONTINUED | OUTPATIENT
Start: 2022-05-18 | End: 2022-05-19

## 2022-05-17 RX ORDER — FUROSEMIDE 10 MG/ML
40 INJECTION INTRAMUSCULAR; INTRAVENOUS ONCE
Status: COMPLETED | OUTPATIENT
Start: 2022-05-17 | End: 2022-05-17

## 2022-05-17 RX ORDER — ACETAMINOPHEN 325 MG/1
650 TABLET ORAL EVERY 6 HOURS PRN
Status: DISCONTINUED | OUTPATIENT
Start: 2022-05-17 | End: 2022-05-23 | Stop reason: HOSPADM

## 2022-05-17 RX ADMIN — LEVALBUTEROL HYDROCHLORIDE 1.25 MG: 1.25 SOLUTION, CONCENTRATE RESPIRATORY (INHALATION) at 13:27

## 2022-05-17 RX ADMIN — INSULIN LISPRO 4 UNITS: 100 INJECTION, SOLUTION INTRAVENOUS; SUBCUTANEOUS at 11:49

## 2022-05-17 RX ADMIN — METOPROLOL TARTRATE 25 MG: 25 TABLET, FILM COATED ORAL at 21:36

## 2022-05-17 RX ADMIN — PRAVASTATIN SODIUM 80 MG: 80 TABLET ORAL at 16:43

## 2022-05-17 RX ADMIN — IPRATROPIUM BROMIDE 0.5 MG: 0.5 SOLUTION RESPIRATORY (INHALATION) at 13:27

## 2022-05-17 RX ADMIN — LEVALBUTEROL HYDROCHLORIDE 1.25 MG: 1.25 SOLUTION, CONCENTRATE RESPIRATORY (INHALATION) at 07:25

## 2022-05-17 RX ADMIN — APIXABAN 5 MG: 5 TABLET, FILM COATED ORAL at 08:09

## 2022-05-17 RX ADMIN — INSULIN LISPRO 2 UNITS: 100 INJECTION, SOLUTION INTRAVENOUS; SUBCUTANEOUS at 16:45

## 2022-05-17 RX ADMIN — METOPROLOL TARTRATE 25 MG: 25 TABLET, FILM COATED ORAL at 08:09

## 2022-05-17 RX ADMIN — DOCUSATE SODIUM 100 MG: 100 CAPSULE, LIQUID FILLED ORAL at 08:09

## 2022-05-17 RX ADMIN — IPRATROPIUM BROMIDE 0.5 MG: 0.5 SOLUTION RESPIRATORY (INHALATION) at 07:25

## 2022-05-17 RX ADMIN — LEVALBUTEROL HYDROCHLORIDE 1.25 MG: 1.25 SOLUTION, CONCENTRATE RESPIRATORY (INHALATION) at 19:54

## 2022-05-17 RX ADMIN — FUROSEMIDE 40 MG: 10 INJECTION, SOLUTION INTRAMUSCULAR; INTRAVENOUS at 09:54

## 2022-05-17 RX ADMIN — CEFTRIAXONE 1000 MG: 1 INJECTION, SOLUTION INTRAVENOUS at 11:50

## 2022-05-17 RX ADMIN — NYSTATIN 1 APPLICATION: 100000 POWDER TOPICAL at 18:55

## 2022-05-17 RX ADMIN — NYSTATIN: 100000 POWDER TOPICAL at 08:09

## 2022-05-17 RX ADMIN — APIXABAN 5 MG: 5 TABLET, FILM COATED ORAL at 18:55

## 2022-05-17 RX ADMIN — INSULIN LISPRO 2 UNITS: 100 INJECTION, SOLUTION INTRAVENOUS; SUBCUTANEOUS at 21:37

## 2022-05-17 RX ADMIN — ACETAMINOPHEN 650 MG: 325 TABLET, FILM COATED ORAL at 20:23

## 2022-05-17 RX ADMIN — IPRATROPIUM BROMIDE 0.5 MG: 0.5 SOLUTION RESPIRATORY (INHALATION) at 19:54

## 2022-05-17 NOTE — QUICK NOTE
Called patient's daughter Rosemary Carnes and provided her with medical update  All questions were answered       To be signed by Dr Osmar Casillas

## 2022-05-17 NOTE — PROGRESS NOTES
Progress Note - Critical Care   Thea Dean 73 y o  female MRN: [id number]  Unit/Bed#: ICU 02 Encounter: [id number]        * Acute on chronic respiratory failure with hypoxia and hypercapnia (HCC)  Assessment & Plan  · Presented to ED for evaluation of multiple complaints including chest pain, shortness of breath, increased work of breathing, somnolence and change in mental status  · Suspect 2/2 decompensated CHF  · Initial Admission ABG  7 094/127 5/55 6/38 2  · VBG improved to ph 7 386 and PC02 56  · CXR-Mild cardiomegaly, Mild increased vascular congestion, Increased bilateral pleural effusions  ·  BNP 3,158, BL as per chart review baseline 3500  · Trop (-)  · WBC 16 86-->8  26- procal 1 15, strep (-), legionella (-), Blood cx x2, and MRSA pending  · Received 40 mg IV lasix in ED and one dose given in ICU- consider restarting home demadex  ·  monitor I's and O's  · Placed on Bipap in ED and continued- patient transitioned to baseline 2L NC and satting in 90's (BL)      Obesity hypoventilation syndrome ([address] )  Assessment & Plan  · Patient has BMI of 48 10  · Sleep study done on 6/2019 showed mild sleep disordered breathing - snoring with features of upper airway resistance  · Patient to be worked up for hypoventilation system while admitted for Bipap  · Order bedside spirometry, overnight pulse ox and morning abg    UTI (urinary tract infection)  Assessment & Plan  · Patient was seen on 5/13 in the ED and found to have UTI treated with Bactrim (will hold)  · UA and urine microscopic-trace leukocytes and WBC 4-10 (improved from previous UA and urine microscopic on 5/13)  · Day # 2 Ceftriaxone 1000 mg IV Q24H      Morbid obesity (HCC)  Assessment & Plan  · BMI 49 19    Acute on chronic diastolic CHF (congestive heart failure) (HCC)  Assessment & Plan  Wt Readings from Last 3 Encounters:   05/16/22 122 kg (268 lb 15 4 oz)   05/13/22 116 kg (255 lb 8 2 oz)   04/28/22 121 kg (267 lb)     · Patient presented SOB in ED received IV lasix 40 mg IV in ED  · Echocardiogram 50% ejection fraction 03/10/2022  · CXR shows mild vascular congestion along with increasing pleural effusions  · ProBNP 3158  · Daily weights  · Strict I's and O'S          Atrial fibrillation with rapid ventricular response (HCC)  Assessment & Plan  · On presentation patient was SOB with HR between 115-140, irregularly irregular rhythmn  · Continue Lopressor 25 BID  · Telemetry  · Optimize electrolytes    Stage 3 chronic kidney disease [facility])  Assessment & Plan  Lab Results   Component Value Date    EGFR 23 05/16/2022    EGFR 35 05/13/2022    EGFR 50 04/12/2022    CREATININE 2 13 (H) 05/16/2022    CREATININE 1 49 (H) 05/13/2022    CREATININE 1 12 04/12/2022     · BL 14 -1 6  · Avoid nephrotoxic agents  · Will hold Demadex 10 mg daily    Status post reverse total replacement of left shoulder  Assessment & Plan  · Patient suffered from fall resulting in Comminuted displaced fracture of the proximal humerus (2/23/22)    · S/p left shoulder reverse complete arthroplasty (3/1/22)       Diabetes mellitus, type 2 ([facility] )  Assessment & Plan    Lab Results   Component Value Date    HGBA1C 6 8 (H) 03/09/2022       · Patient is on lantus 35 units qhs- will hold   · Continue Humalog sliding scale      SEBASTIAN (acute kidney injury) ([facility] [address] )  Assessment & Plan  History of CKD stage 3  Cr 2 13-->1 97  Baseline creatinine around 1 2-1 4  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  Avoid nephrotoxic agents and hypotension    Essential hypertension  Assessment & Plan  · Continue Lopressor 25 BID  · Patient is on Demadex 10 mg daily- will hold      Mixed hyperlipidemia  Assessment & Plan  · Continue statin    Paroxysmal atrial fibrillation ([address] )  Assessment & Plan  · Patient has known history of paroxysmal l atrial fibrillation   · EKG in ED showed patient to have Atrial fibrillation with rapid ventricular response  · Continue Lopressor 25 p o  B i d  And anticoagulation with Eliquis  · Patient follows up with Dr Mj Mann leg edema  Assessment & Plan  · Chronic bilateral lower extremity edema  · Given SEBASTIAN, will hold torsemide  · Daily weights, monitor intake and output    Asthma  Assessment & Plan  · Continue albuterol inhaler        HPI/24hr events:     Patient states today she is feeling much better  Notes some mild chills and mild SOB  She denies chest pain, headaches, and fever  She is currently on her baseline 2L NC  Physical Exam:     Physical Exam  Constitutional:       Appearance: She is obese  HENT:      Head: Normocephalic and atraumatic  Cardiovascular:      Rate and Rhythm: Normal rate and regular rhythm  Pulmonary:      Effort: Pulmonary effort is normal       Breath sounds: Normal breath sounds  Abdominal:      General: Bowel sounds are normal    Musculoskeletal:      Right lower leg: Edema present  Left lower leg: Edema present  Comments: Baseline LE edema     Neurological:      General: No focal deficit present  Mental Status: She is alert and oriented to person, place, and time  Psychiatric:         Mood and Affect: Mood normal          Behavior: Behavior normal            Vitals:    22 0725 22 0800 22 0809 22 0810   BP:   128/65 128/65   Pulse:   89 89   Resp:    20   Temp:    98 3 °F (36 8 °C)   TempSrc:    Temporal   SpO2: 96% 96%  96%   Weight:       Height:                 Temperature:   Temp (24hrs), Av 9 °F (36 6 °C), Min:96 7 °F (35 9 °C), Max:99 4 °F (37 4 °C)    Current: Temperature: 98 3 °F (36 8 °C)    Weights:        Body mass index is 48 1 kg/m²    Weight (last 2 days)     Date/Time Weight    22 0553 119 (263 01)    22 1353 119 (262 57)    22 0847 122 (268 96)               Non-Invasive/Invasive Ventilation Settings:  Respiratory  Report   Lab Data (Last 4 hours)    None         O2/Vent Data (Last 4 hours)    None              No results found for: PHART, BMS3UDY, PO2ART, SBR7WTD, E3SUSWKR, BEART, SOURCE  SpO2: SpO2: 96 %    Intake and Outputs:  I/O       05/15 0701  05/16 0700 05/16 0701 05/17 0700 05/17 0701 05/18 0700    P  O   480     IV Piggyback  50     Total Intake(mL/kg)  530 (4 5)     Urine (mL/kg/hr)  1350     Total Output  1350     Net  -820                  Nutrition:        Diet Orders   (From admission, onward)             Start     Ordered    05/16/22 2030  Diet Cardiovascular; Cardiac; Fluid Restriction 1800 ML  Diet effective now        References:    Nutrtion Support Algorithm Enteral vs  Parenteral   Question Answer Comment   Diet Type Cardiovascular    Cardiac Cardiac    Other Restriction(s): Fluid Restriction 1800 ML    RD to adjust diet per protocol?  No        05/16/22 2029 05/16/22 1643  Room Service  Once        Question:  Type of Service  Answer:  Room Service - Appropriate with Assistance    05/16/22 1643                  Labs:   Results from last 7 days   Lab Units 05/17/22  0548 05/16/22  0901 05/13/22  0027   WBC Thousand/uL 8 26 16 86* 8 30   HEMOGLOBIN g/dL 8 8* 10 5* 9 2*   HEMATOCRIT % 29 4* 37 2 31 4*   PLATELETS Thousands/uL 181 243 174   NEUTROS PCT % 63 82* 58   MONOS PCT % 10 6 10      Results from last 7 days   Lab Units 05/17/22  0548 05/16/22  0901 05/13/22  0027   SODIUM mmol/L 144 143 141   POTASSIUM mmol/L 5 0 5 2 4 4   CHLORIDE mmol/L 103 102 99*   CO2 mmol/L 37* 37* 37*   BUN mg/dL 46* 41* 36*   CREATININE mg/dL 1 97* 2 13* 1 49*   CALCIUM mg/dL 8 6 8 6 8 0*   ALK PHOS U/L 59 84 91   ALT U/L 15 18 15   AST U/L 13 19 10     Results from last 7 days   Lab Units 05/17/22  0548 05/13/22  0027   MAGNESIUM mg/dL 2 1 1 8     Results from last 7 days   Lab Units 05/17/22  0548   PHOSPHORUS mg/dL 3 6      Results from last 7 days   Lab Units 05/13/22  0027   INR  1 18   PTT seconds 33         0   Lab Value Date/Time    TROPONINI <0 02 07/29/2019 1519    TROPONINI <0 02 06/25/2019 1657    TROPONINI <0 02 05/25/2019 1442    TROPONINI <0 02 03/08/2018 1231       Imaging:   CT head without contrast   Final Result      1  No acute intracranial abnormality  2   New left mastoid effusion  Correlation for acute mastoiditis recommended  Workstation performed: [id number]         XR chest 1 view portable   Final Result      Mild cardiomegaly      Mild increased vascular congestion      Increased bilateral pleural effusions                  Workstation performed: [id number]                   Micro:  Lab Results   Component Value Date    BLOODCX Received in Microbiology Lab  Culture in Progress  05/16/2022    BLOODCX Received in Microbiology Lab  Culture in Progress  05/16/2022    BLOODCX No Growth After 5 Days  03/28/2022    URINECX 50,000-59,000 cfu/ml  05/13/2022    URINECX 50,000-59,000 cfu/ml  04/12/2022    URINECX 50,000-59,000 cfu/ml  03/28/2022       Allergies:    Allergies   Allergen Reactions    Penicillins Hives    Moxifloxacin Other (See Comments)     unknown    Percocet [Oxycodone-Acetaminophen] Other (See Comments)     unknown    Zinc Acetate Other (See Comments)     unknown    Asa [Aspirin] GI Intolerance    Indocin [Indomethacin] Other (See Comments)     Made patient "loopy"    Other Other (See Comments)     unknown       Medications:   Scheduled Meds:  Current Facility-Administered Medications   Medication Dose Route Frequency Provider Last Rate    albuterol  2 puff Inhalation Q6H PRN Andre Holden MD      apixaban  5 mg Oral BID Andre Holden MD      cefTRIAXone  1,000 mg Intravenous Q24H Andre Holden MD Stopped (05/16/22 1451)    docusate sodium  100 mg Oral Daily Andre Holden MD      insulin lispro  2-12 Units Subcutaneous 4x Daily (AC & HS) Andre Holden MD      ipratropium  0 5 mg Nebulization TID RK Neff      levalbuterol  1 25 mg Nebulization TID RK Neff      metoprolol tartrate  25 mg Oral Q12H [address], MD      nystatin   Topical BID Lisa Murphy MD      pravastatin  80 mg Oral Daily With Blu Solorzano MD     [facility] [START ON 5/18/2022] torsemide  20 mg Oral Daily RK Vee       Continuous Infusions:   PRN Meds:  albuterol, 2 puff, Q6H PRN        VTE Pharmacologic Prophylaxis: Reason for no pharmacologic prophylaxis patient is on Elliquis  VTE Mechanical Prophylaxis: sequential compression device    Invasive lines and devices: Invasive Devices  Report    Peripheral Intravenous Line  Duration           Peripheral IV 05/16/22 Right Antecubital 1 day    Peripheral IV 05/16/22 Right Antecubital <1 day          Drain  Duration           External Urinary Catheter <1 day                   Counseling / Coordination of Care  Total Critical Care time spent 30 minutes excluding procedures, teaching and family updates  Code Status: Level 1 - Full Code     Portions of the record may have been created with voice recognition software  Occasional wrong word or "sound a like" substitutions may have occurred due to the inherent limitations of voice recognition software  Read the chart carefully and recognize, using context, where substitutions have occurred       Lisa Murphy MD

## 2022-05-17 NOTE — ASSESSMENT & PLAN NOTE
· Patient has BMI of 48 10  · Sleep study done on 6/2019 showed mild sleep disordered breathing - snoring with features of upper airway resistance  · Patient to be worked up for hypoventilation system while admitted for Bipap  · Order bedside spirometry, overnight pulse ox and morning abg

## 2022-05-17 NOTE — CASE MANAGEMENT
Case Management Assessment & Discharge Planning Note    Patient name Samuel Ariza  Location ICU ICU  MRN [id number]  : 1950 Date 2022       Current Admission Date: 2022  Current Admission Diagnosis:Acute on chronic respiratory failure with hypoxia and hypercapnia [facility])   Patient Active Problem List    Diagnosis Date Noted    Obesity hypoventilation syndrome ([facility] ) 2022    Acute on chronic respiratory failure with hypoxia and hypercapnia ([facility] ) 2022    UTI (urinary tract infection) 2022    Morbid obesity ([facility] ) 2022    Pure hypercholesterolemia 2022    Lump of left breast 2022    Pressure injury of deep tissue of sacral region 2022    Pulmonary nodules 2022    Acute on chronic diastolic CHF (congestive heart failure) ([facility] ) 2022    Atrial fibrillation with rapid ventricular response ([facility] ) 2022    Peripheral venous insufficiency 2022    Post-herpetic polyneuropathy 2022    Raynaud's disease 2022    Lung nodule < 6cm on CT 2022    Stage 3 chronic kidney disease (HCC)     Chronic diastolic congestive heart failure ([facility] ) 03/10/2022    Status post reverse total replacement of left shoulder 2022    Closed 4-part fracture of proximal humerus, left, initial encounter 2022    PONV (postoperative nausea and vomiting) 2022    SEBASTIAN (acute kidney injury) ([facility] ) 2022    Diabetes mellitus, type 2 ([facility] ) 2022    Gastroesophageal reflux disease 2022    Nephrolithiasis 2022    Arthritis 2022    S/P total knee replacement, right 2022    On continuous oral anticoagulation - eliquis 2022    Humeral head fracture 2022    Essential hypertension 2019    Anemia 2019    Mixed hyperlipidemia 2019    Paroxysmal atrial fibrillation (HCC) 2019    Lower leg edema 2019    Asthma 2018    Morbid obesity with BMI of 45 0-49 9, adult ([facility] [address] ) 03/08/2018      LOS (days): 1  Geometric Mean LOS (GMLOS) (days): 3 80  Days to GMLOS:2 6     OBJECTIVE:    Risk of Unplanned Readmission Score: 36 03      Current admission status: Inpatient  Referral Reason: Other (Discharge Planning)    Preferred Pharmacy:   [address] ([address], [address] [facility] ([address] [address]) [address] [address] [place] [address] [phone])  [place] [phone]  Phone: [phone] Fax: [phone]    Primary Care Provider: Kelsey Wright MD    Primary Insurance: MEDICARE  Secondary Insurance: [employer]    ASSESSMENT:  Active Health Care Proxies     Jayden AlanisMary [facility] Representative - Sister   Primary Phone: [phone] (Mobile)            Readmission Root Cause  30 Day Readmission: No    Patient Information  Admitted from[de-identified] Home  Mental Status: Alert  During Assessment patient was accompanied by: Not accompanied during assessment  Assessment information provided by[de-identified] Daughter  Primary Caregiver: Family  Caregiver's Name[de-identified] Will  Relationship to Patient[de-identified] Family Member  Caregiver's Telephone Number[de-identified] [phone]  Support Systems: Family members  [place] of Residence: [address] [place] [address] do you live in?: [address] 45 entry access options   Select all that apply : Stairs  Number of steps to enter home : 3  Type of Current Residence: [place]  In the last 12 months, was there a time when you were not able to pay the mortgage or rent on time?: No  In the last 12 months, how many places have you lived?: 2  In the last 12 months, was there a time when you did not have a steady place to sleep or slept in a shelter (including now)?: No  Homeless/housing insecurity resource given?: N/A  Living Arrangements: Lives w/ Extended Family (Lives with niece)    Activities of Daily Living Prior to Admission  Functional Status: Assistance  Completes ADLs independently?: No (Needs assist with dressing (L arm impaired))  Level of ADL dependence: Assistance  Ambulates independently?: Yes  Does patient use assisted devices?: Yes  Assisted Devices (DME) used: Home Oxygen concentrator, Portable Oxygen concentrator, Portable Oxygen tanks, [facility]  DME Company Name (respiratory supplies): [employer]  O2 Rate(s):  2LNC  Does patient currently own DME?: Yes  What DME does the patient currently own?: Hospital Bed, Portable Oxygen tanks, Portable Oxygen concentrator, Home Oxygen concentrator, Shower Chair, Wheelchair, [facility]  Does patient have a history of Outpatient Therapy (PT/OT)?: No  Does the patient have a history of [facility]?: [facility]  Does patient have a history of HHC?: Yes  [place] [address]?: Yes    Current Home Health Care  Type of Current Home Care Services: Nurse visit, [address][de-identified] Edin Cash [address] Provider[de-identified] PCP    Patient Information Continued  Income Source: Pension/[facility]  Does patient have prescription coverage?: Yes  Within the past 12 months, you worried that your food would run out before you got the money to buy more : Never true  Within the past 12 months, the food you bought just didn't last and you didn't have money to get more : Never true  Food insecurity resource given?: N/A  Does patient receive dialysis treatments?: No  Does patient have a history of substance abuse?: No  Does patient have a history of Mental Health Diagnosis?: No     Means of Transportation  [facility]] Family transport  In the past 12 months, has lack of transportation kept you from medical appointments or from getting medications?: No  In the past 12 months, has lack of transportation kept you from meetings, work, or from getting things needed for daily living?: No  Was application for public transport provided?: N/A    DISCHARGE DETAILS:    Discharge planning discussed with[de-identified] Karen Blandon  Freedom of Choice: Yes  Comments - Freedom of Choice: Choice is to resume [facility] services with [facility] upon discharge  CM contacted family/caregiver?: Yes  Were Treatment Team discharge recommendations reviewed with patient/caregiver?: Yes  Did patient/caregiver verbalize understanding of patient care needs?: Yes  Were patient/caregiver advised of the risks associated with not following Treatment Team discharge recommendations?: Yes    Contacts  Patient Contacts: Osmel Correia  Relationship to Patient[de-identified] Family (Sister)  Contact Method: Phone  Phone Number: [phone]  Reason/Outcome: Discharge Planning, Emergency Contact, Continuity of [address]         Is the patient interested in [facility] at discharge?: Yes  [address] requested[de-identified] Nursing, Physical Therapy, Occupational [address] Name[de-identified] [address] Provider[de-identified] PCP  Home Health Services Needed[de-identified] Heart Failure Management, Gait/ADL Training, Evaluate Functional Status and Safety, Strengthening/Theraputic Exercises to Improve Function, Oxygen Via Nasal Cannula, Diabetes Management  Oxygen LPM Ordered (if applicable based on home health services needed):: 2 LPM  Homebound Criteria Met[de-identified] Requires the Assistance of Another Person for Safe Ambulation or to Leave the Home, Uses an Assist Device (i e  cane, walker, etc)  Supporting Clincal Findings[de-identified] Limited Endurance, Fatigues Easliy in [employer], Requires Oxygen     Other Referral/Resources/Interventions Provided:  Interventions: [facility]    SW called pt's sister Vicki Uribe to introduce role, complete assessment, and discuss discharge planning needs  Vicki Uribe confirmed that pt has been living at her niece Lesly's home for additional support since L shoulder replacement on 3/1/22  Lisa Rojo works from home and is able to provide 24/7 support  [facility] is open with [facility]  Pt has had a positive experience with the agency and choice is to resume services on discharge  Lissett Bernal stated that family is in agreement that they do not want pt to go to SNF for STR or LTC  SW advised that PT/OT are consulted with evaluations pending  SW will advise of recommendation when available  Lissett Bernal has no questions or concerns for SW at this time  SW will continue to follow for discharge planning needs  [facility] referral sent to [facility]

## 2022-05-18 ENCOUNTER — APPOINTMENT (INPATIENT)
Dept: PULMONOLOGY | Facility: HOSPITAL | Age: 72
DRG: 291 | End: 2022-05-18
Payer: MEDICARE

## 2022-05-18 PROBLEM — R78.81 POSITIVE BLOOD CULTURE: Status: ACTIVE | Noted: 2022-05-18

## 2022-05-18 LAB
ANION GAP SERPL CALCULATED.3IONS-SCNC: 3 MMOL/L (ref 4–13)
ARTERIAL PATENCY WRIST A: YES
BASE EXCESS BLDA CALC-SCNC: 9.5 MMOL/L
BASOPHILS # BLD AUTO: 0.07 THOUSANDS/ΜL (ref 0–0.1)
BASOPHILS NFR BLD AUTO: 1 % (ref 0–1)
BODY TEMPERATURE: 97.8 DEGREES FEHRENHEIT
BUN SERPL-MCNC: 42 MG/DL (ref 5–25)
CALCIUM SERPL-MCNC: 8.8 MG/DL (ref 8.3–10.1)
CHLORIDE SERPL-SCNC: 102 MMOL/L (ref 100–108)
CO2 SERPL-SCNC: 37 MMOL/L (ref 21–32)
CREAT SERPL-MCNC: 1.74 MG/DL (ref 0.6–1.3)
EOSINOPHIL # BLD AUTO: 0.35 THOUSAND/ΜL (ref 0–0.61)
EOSINOPHIL NFR BLD AUTO: 5 % (ref 0–6)
ERYTHROCYTE [DISTWIDTH] IN BLOOD BY AUTOMATED COUNT: 14.6 % (ref 11.6–15.1)
GFR SERPL CREATININE-BSD FRML MDRD: 29 ML/MIN/1.73SQ M
GLUCOSE SERPL-MCNC: 142 MG/DL (ref 65–140)
GLUCOSE SERPL-MCNC: 177 MG/DL (ref 65–140)
GLUCOSE SERPL-MCNC: 237 MG/DL (ref 65–140)
GLUCOSE SERPL-MCNC: 264 MG/DL (ref 65–140)
HCO3 BLDA-SCNC: 36.2 MMOL/L (ref 22–28)
HCT VFR BLD AUTO: 30.5 % (ref 34.8–46.1)
HGB BLD-MCNC: 8.9 G/DL (ref 11.5–15.4)
IMM GRANULOCYTES # BLD AUTO: 0.01 THOUSAND/UL (ref 0–0.2)
IMM GRANULOCYTES NFR BLD AUTO: 0 % (ref 0–2)
LYMPHOCYTES # BLD AUTO: 1.93 THOUSANDS/ΜL (ref 0.6–4.47)
LYMPHOCYTES NFR BLD AUTO: 29 % (ref 14–44)
MAGNESIUM SERPL-MCNC: 2.4 MG/DL (ref 1.6–2.6)
MCH RBC QN AUTO: 27.7 PG (ref 26.8–34.3)
MCHC RBC AUTO-ENTMCNC: 29.2 G/DL (ref 31.4–37.4)
MCV RBC AUTO: 95 FL (ref 82–98)
MONOCYTES # BLD AUTO: 0.72 THOUSAND/ΜL (ref 0.17–1.22)
MONOCYTES NFR BLD AUTO: 11 % (ref 4–12)
NASAL CANNULA: 3.5
NEUTROPHILS # BLD AUTO: 3.69 THOUSANDS/ΜL (ref 1.85–7.62)
NEUTS SEG NFR BLD AUTO: 54 % (ref 43–75)
NRBC BLD AUTO-RTO: 0 /100 WBCS
O2 CT BLDA-SCNC: 13.3 ML/DL (ref 16–23)
OXYHGB MFR BLDA: 96.2 % (ref 94–97)
PCO2 BLDA: 62.8 MM HG (ref 36–44)
PCO2 TEMP ADJ BLDA: 61.7 MM HG (ref 36–44)
PH BLD: 7.38 [PH] (ref 7.35–7.45)
PH BLDA: 7.38 [PH] (ref 7.35–7.45)
PLATELET # BLD AUTO: 169 THOUSANDS/UL (ref 149–390)
PMV BLD AUTO: 10.4 FL (ref 8.9–12.7)
PO2 BLD: 90.2 MM HG (ref 75–129)
PO2 BLDA: 92.5 MM HG (ref 75–129)
POTASSIUM SERPL-SCNC: 4.9 MMOL/L (ref 3.5–5.3)
PROCALCITONIN SERPL-MCNC: 0.73 NG/ML
RBC # BLD AUTO: 3.21 MILLION/UL (ref 3.81–5.12)
SODIUM SERPL-SCNC: 142 MMOL/L (ref 136–145)
SPECIMEN SOURCE: ABNORMAL
WBC # BLD AUTO: 6.77 THOUSAND/UL (ref 4.31–10.16)

## 2022-05-18 PROCEDURE — 97163 PT EVAL HIGH COMPLEX 45 MIN: CPT

## 2022-05-18 PROCEDURE — 85025 COMPLETE CBC W/AUTO DIFF WBC: CPT | Performed by: NURSE PRACTITIONER

## 2022-05-18 PROCEDURE — 94010 BREATHING CAPACITY TEST: CPT | Performed by: INTERNAL MEDICINE

## 2022-05-18 PROCEDURE — 94640 AIRWAY INHALATION TREATMENT: CPT

## 2022-05-18 PROCEDURE — 94760 N-INVAS EAR/PLS OXIMETRY 1: CPT

## 2022-05-18 PROCEDURE — 94660 CPAP INITIATION&MGMT: CPT

## 2022-05-18 PROCEDURE — 99232 SBSQ HOSP IP/OBS MODERATE 35: CPT | Performed by: INTERNAL MEDICINE

## 2022-05-18 PROCEDURE — 97535 SELF CARE MNGMENT TRAINING: CPT

## 2022-05-18 PROCEDURE — 97116 GAIT TRAINING THERAPY: CPT

## 2022-05-18 PROCEDURE — 97167 OT EVAL HIGH COMPLEX 60 MIN: CPT

## 2022-05-18 PROCEDURE — 83735 ASSAY OF MAGNESIUM: CPT | Performed by: NURSE PRACTITIONER

## 2022-05-18 PROCEDURE — 82805 BLOOD GASES W/O2 SATURATION: CPT | Performed by: NURSE PRACTITIONER

## 2022-05-18 PROCEDURE — 84145 PROCALCITONIN (PCT): CPT | Performed by: NURSE PRACTITIONER

## 2022-05-18 PROCEDURE — 94060 EVALUATION OF WHEEZING: CPT

## 2022-05-18 PROCEDURE — 80048 BASIC METABOLIC PNL TOTAL CA: CPT | Performed by: NURSE PRACTITIONER

## 2022-05-18 PROCEDURE — 82948 REAGENT STRIP/BLOOD GLUCOSE: CPT

## 2022-05-18 RX ORDER — INSULIN GLARGINE 100 [IU]/ML
25 INJECTION, SOLUTION SUBCUTANEOUS
Status: DISCONTINUED | OUTPATIENT
Start: 2022-05-18 | End: 2022-05-23 | Stop reason: HOSPADM

## 2022-05-18 RX ADMIN — IPRATROPIUM BROMIDE 0.5 MG: 0.5 SOLUTION RESPIRATORY (INHALATION) at 07:33

## 2022-05-18 RX ADMIN — INSULIN LISPRO 4 UNITS: 100 INJECTION, SOLUTION INTRAVENOUS; SUBCUTANEOUS at 22:07

## 2022-05-18 RX ADMIN — LEVALBUTEROL HYDROCHLORIDE 1.25 MG: 1.25 SOLUTION, CONCENTRATE RESPIRATORY (INHALATION) at 07:33

## 2022-05-18 RX ADMIN — LEVALBUTEROL HYDROCHLORIDE 1.25 MG: 1.25 SOLUTION, CONCENTRATE RESPIRATORY (INHALATION) at 17:14

## 2022-05-18 RX ADMIN — CEFTRIAXONE 1000 MG: 1 INJECTION, SOLUTION INTRAVENOUS at 11:23

## 2022-05-18 RX ADMIN — IPRATROPIUM BROMIDE 0.5 MG: 0.5 SOLUTION RESPIRATORY (INHALATION) at 13:30

## 2022-05-18 RX ADMIN — LEVALBUTEROL HYDROCHLORIDE 1.25 MG: 1.25 SOLUTION, CONCENTRATE RESPIRATORY (INHALATION) at 19:55

## 2022-05-18 RX ADMIN — METOPROLOL TARTRATE 25 MG: 25 TABLET, FILM COATED ORAL at 22:05

## 2022-05-18 RX ADMIN — METOPROLOL TARTRATE 25 MG: 25 TABLET, FILM COATED ORAL at 08:40

## 2022-05-18 RX ADMIN — LEVALBUTEROL HYDROCHLORIDE 1.25 MG: 1.25 SOLUTION, CONCENTRATE RESPIRATORY (INHALATION) at 13:30

## 2022-05-18 RX ADMIN — TORSEMIDE 20 MG: 20 TABLET ORAL at 08:40

## 2022-05-18 RX ADMIN — INSULIN LISPRO 2 UNITS: 100 INJECTION, SOLUTION INTRAVENOUS; SUBCUTANEOUS at 06:43

## 2022-05-18 RX ADMIN — IPRATROPIUM BROMIDE 0.5 MG: 0.5 SOLUTION RESPIRATORY (INHALATION) at 19:55

## 2022-05-18 RX ADMIN — APIXABAN 5 MG: 5 TABLET, FILM COATED ORAL at 08:39

## 2022-05-18 RX ADMIN — APIXABAN 5 MG: 5 TABLET, FILM COATED ORAL at 17:19

## 2022-05-18 RX ADMIN — NYSTATIN: 100000 POWDER TOPICAL at 17:19

## 2022-05-18 RX ADMIN — INSULIN GLARGINE 25 UNITS: 100 INJECTION, SOLUTION SUBCUTANEOUS at 22:12

## 2022-05-18 RX ADMIN — INSULIN LISPRO 6 UNITS: 100 INJECTION, SOLUTION INTRAVENOUS; SUBCUTANEOUS at 11:27

## 2022-05-18 RX ADMIN — PRAVASTATIN SODIUM 80 MG: 80 TABLET ORAL at 17:19

## 2022-05-18 RX ADMIN — NYSTATIN 1 APPLICATION: 100000 POWDER TOPICAL at 08:40

## 2022-05-18 NOTE — PROGRESS NOTES
Pt is a telemetry hold from 5/17  Bed assignment 218  Report called to accepting Charge nurse Aamir New  Patient informed of transfer to room 218  Leoncio loyola called as patient requested verbal update given and notified of transfer out of ICU   Patient to be transferred via wheelchair once room is ready

## 2022-05-18 NOTE — PHYSICAL THERAPY NOTE
PHYSICAL THERAPY EVALUATION/TREATMENT     05/18/22 0855   Note Type   Note type Evaluation   Pain Assessment   Pain Assessment Tool 0-10   Pain Score No Pain   Restrictions/Precautions   Weight Bearing Precautions Per Order   (Per pt no more than 20% weight-bearing left upper extremity)   Other Precautions O2;Fall Risk;Contact isolation   Home Living   Type of [place] One level  (3 JESS)   [id number] [facility] bed;Hemiwalker;Cane;Wheelchair-manual;Walker  (Home O2 2 L 24/7)   Additional Comments Pt was receiving home PT/OT prior to admission   Prior Function   Level of [place] Independent with ADLs and functional mobility   Lives With Family  (Niece)   Receives Help From Family   ADL Assistance Independent   Comments Pt ambulating without an assistive device prior to admission   General   Additional Pertinent History Pt is admitted with shortness of breath and lethargic   Family/Caregiver Present No   Cognition   Overall Cognitive Status WFL   Arousal/Participation Cooperative   Orientation Level Oriented X4   Following Commands Follows all commands and directions without difficulty   Subjective   Subjective Not bad   RLE Assessment   RLE Assessment WFL  (3/5)   LLE Assessment   LLE Assessment WFL  (3/5)   Transfers   Sit to Stand 4  Minimal assistance   Additional items Assist x 1;Verbal cues   Stand to Sit 4  Minimal assistance   Additional items Assist x 1;Verbal cues   Ambulation/Elevation   Gait pattern   (Minimal generalized unsteadiness)   Gait Assistance 4  Minimal assist   Additional items Assist x 1;Verbal cues; Tactile cues   Assistive Device None  (Minimal handhold assist)   Distance 12 ft with change in direction;  SaO2 at rest on 2 5 L O2 92% and decrease to 86% with short distance ambulation and pt minimally SOB; SaO2 recovers fairly quickly to baseline   Balance   Static Sitting Good   Static Standing Fair -   Dynamic Standing Poor +   Ambulatory Poor +   Activity Tolerance   Activity Tolerance Patient limited by fatigue;Treatment limited secondary to medical complications (Comment)  (Shortness of breath; decreasing SaO2; generalized weakness/deconditioning)   Assessment   Problem List Decreased strength;Decreased range of motion;Decreased endurance; Impaired balance;Decreased mobility; Decreased coordination;Decreased safety awareness; Obesity   Assessment Patient seen for Physical Therapy evaluation  Patient admitted with Acute on chronic respiratory failure with hypoxia and hypercapnia ([facility] [address] )  Comorbidities affecting patient's physical performance include:  Asthma, lower leg edema, mixed hyperlipidemia, essential hypertension, SEBASTIAN, dm 2, status post reverse total shoulder replacement left, CKD 3, AFib with RVR, CHF, obesity, UTI, obesity hypoventilation syndrome, humeral head/closed 4 part proximal humerus fracture, arthritis  Personal factors affecting patient at time of initial evaluation include: lives in one story house, stairs to enter home, inability to navigate community distances, inability to navigate level surfaces without external assistance, inability to perform dynamic tasks in community and positive fall history  Prior to admission, patient was independent with functional mobility without assistive device, independent with ADLS, living with niece in a one level home with 3 steps to enter and ambulating household distance  Please find objective findings from Physical Therapy assessment regarding body systems outlined above with impairments and limitations including weakness, decreased ROM, impaired balance, decreased endurance, impaired coordination, gait deviations, decreased activity tolerance, decreased functional mobility tolerance, decreased safety awareness, fall risk and SOB upon exertion    The Barthel Index was used as a functional outcome tool presenting with a score of Barthel Index Score: 45 today indicating marked limitations of functional mobility and ADLS  Patient's clinical presentation is currently unstable/unpredictable as seen in patient's presentation of vital sign response, changing level of pain, increased fall risk, new onset of impairment of functional mobility, decreased endurance and new onset of weakness  Pt would benefit from continued Physical Therapy treatment to address deficits as defined above and maximize level of functional mobility  As demonstrated by objective findings, the assigned level of complexity for this evaluation is high  The patient's AM-PAC Basic Mobility Inpatient Short Form Raw Score is 15  A Raw score of less than or equal to 16 suggests the patient may benefit from discharge to post-acute rehabilitation services  Please also refer to the recommendation of the Physical Therapist for safe discharge planning  Despite ampac score, pt is safe for discharge home with assistance of niece, continued ambulation with a RW and home PT  Goals   Patient Goals To go back home with my niece   STG Expiration Date 05/25/22   Short Term Goal #1 Bed mobility-Min assist; transfers-S   Short Term Goal #2 Pt will ambulate with a RW functional household distances-S; balance with a RW will be at least F for safe gait mobility and to decrease fall risk   LTG Expiration Date 06/01/22   Long Term Goal #1 Bed mobility and transfers-I; pt will ambulate with a RW functional household distances-I   Long Term Goal #2 Balance with a RW will be F+/good for safe gait mobility and to decrease fall risk; up/down 3 steps so pt can enter/exit her home-S in order to meet her goal of going home; BLE strength 3+/5   Plan   Treatment/Interventions ADL retraining;Functional transfer training;LE strengthening/ROM; Elevations; Therapeutic exercise; Endurance training;Patient/family training;Equipment eval/education; Bed mobility;Gait training; Compensatory technique education   PT Frequency Other (Comment)  (5x/wk)   Recommendation   PT Discharge Recommendation Home with home health rehabilitation   Equipment Recommended [facility] Recommended Wheeled walker   Additional Comments Per orthopedic note 04/15/2022 sling left upper extremity was discontinued  Although unable to find documentation in the chart, pt reports that she is no more than 20% weight-bearing to her left upper extremity  Pt is able to successfully use a RW without weight-bearing greater than 20% to the left upper extremity   Additional Comments 2 Pt also states she was to begin outpatient PT for her left shoulder 05/20/2022   AM-PAC Basic Mobility Inpatient   Turning in Bed Without Bedrails 2   Lying on Back to Sitting on Edge of Flat Bed 2   Moving Bed to Chair 3   Standing Up From Chair 3   Walk in Room 3   Climb 3-5 Stairs 2   Basic Mobility Inpatient Raw Score 15   Basic Mobility Standardized Score 36 97   Highest Level Of Mobility   -[facility] Goal 4: Move to chair/commode   -HL Achieved 6: Walk 10 steps or more   Barthel Index   Feeding 5   Bathing 0   Grooming Score 0   Dressing Score 5   Bladder Score 10   Bowels Score 10   Toilet Use Score 5   Transfers (Bed/Chair) Score 10   Mobility (Level Surface) Score 0   Stairs Score 0   Barthel Index Score 45   Additional Treatment Session   Start Time 0855   End Time 0905   Treatment Assessment S:  Not bad O:  Pt transfers sit to stand with minimal assistance and ambulates with a RW 15 ft with change in direction with minimal assistance  Pt transfer stand to sit with minimal assist   Gait endurance and balance is improved with RW verses handhold assist or no assistive device  SaO2 on 2 L O2 with ambulation decreases from 92% at rest to 88% with limited activity and pt is noted to be minimally SOB  Pt recovers shortness of breath and SaO2 fairly quickly    Pt will continue to benefit from further gait training with a RW while maintaining no greater than 20% weight-bearing left upper extremity with progression as able in addition to continuing with strengthening, balance, endurance and overall mobility  Pt is safe for discharge home with assist of her niece as previous and home PT  End of Consult   Patient Position at End of Consult Bedside chair; All needs within reach  (RN present and aware)   Licensure   [place] License Number  [id number] [id number]     Portions of the documentation may have been created using voice recognition software  Occasional wrong word or sound alike substitutions may have occurred due to the inherent limitation of the voice recognition software  Read the chart carefully and recognize, using context, where substitutions have occurred  Principal Discharge DX:	Insect bite of right lower leg, initial encounter  Secondary Diagnosis:	Cellulitis of right lower extremity

## 2022-05-18 NOTE — OCCUPATIONAL THERAPY NOTE
Occupational Therapy Evaluation/Treatment       05/18/22 1440   Note Type   Note type Evaluation   Restrictions/Precautions   Weight Bearing Precautions Per Order Yes   LUE Weight Bearing Per Order Other   RLE Weight Bearing Per Order (S)    (Per pt no more than 20% weight-bearing left upper extremity)   Other Precautions Contact/isolation; Chair Alarm; Bed Alarm;O2;Fall Risk   Pain Assessment   Pain Assessment Tool 0-10   Pain Score No Pain   Home Living   Type of [address] One level;Stairs to enter with rails  (3 JESS)   Bathroom Shower/Tub Tub/shower unit   Bathroom Toilet Standard   Bathroom Equipment Shower chair;Hand-held shower   Home Equipment Hemiwalker;Cane;Wheelchair-manual;Reacher;Sock aid;Hospital bed  (Home O2 2L at al times)   Additional Comments Walking independenlty without AD  PTA   Prior Function   Level of [place] Needs assistance with ADLs and functional mobility; Needs assistance with IADLs   Lives With Family  (niece, Analy Lawrence)   [address] Help From Family   ADL Assistance Needs assistance   IADLs Needs assistance   Falls in the last 6 months 1 to 4   Comments Bathing and dressing with Jose PTA  Was getting home OT and PT for several weeks and then planning for out-pt therapy right before admission   Psychosocial   Psychosocial (WDL) WDL   Patient Behaviors/Mood Calm; Cooperative   Subjective   Subjective "Every time I plan to go to out-patient, something always gets me back in here "   ADL   Where Assessed Chair   Eating Assistance 5  Supervision/Setup   Eating Deficit Setup   Grooming Deficit Supervision/safety   UB Bathing Assistance 4  Minimal Assistance   LB Bathing Assistance 3  Moderate Assistance   [address] 3  Moderate [address]  4  Minimal Assistance   Functional Deficit Setup;Steadying;Verbal cueing; Increased time to complete   Additional Comments for all ADLS due to decreased LUE use, decreased strength, decreased endurance and decreased balance at this time   Transfers   Sit to Stand 4  Minimal assistance   Additional items Assist x 1;Verbal cues   Stand to Sit 4  Minimal assistance   Additional items Assist x 1;Verbal cues   Balance   Static Sitting Fair +   Dynamic Sitting Fair   Static Standing Fair   Dynamic Standing Fair -   Ambulatory Fair -   Activity Tolerance   Activity Tolerance Patient limited by pain; Other (Comment)  (Limited by SOB)   Nurse Made Aware LUISITO Osorio   RUE Assessment   RUE Assessment WFL   LUE Assessment   LUE Assessment X   LUE Overall AROM   L Shoulder Flexion 20   L Elbow Flexion 90   L Elbow Supination 10   L Wrist Extension 5   L Mass Grasp no full grasp due to edema   Hand Function   Gross Motor Coordination Impaired  (LUE)   Fine Motor Coordination Impaired  (LUE)   Vision-Basic Assessment   Current Vision Wears glasses only for reading   Visual History Cataracts   Patient Visual Report   (increased blurry vision; has not been to the eye doctor since before covid)   Cognition   Arousal/Participation Alert; Cooperative   Attention Attends with cues to redirect   Orientation Level Oriented X4   Memory Decreased recall of recent events   Following Commands Follows all commands and directions without difficulty   Comments Pt reports some recent memory issues   Assessment   Limitation Decreased ADL status; Decreased UE ROM; Decreased UE strength;Decreased Safe judgement during ADL;Decreased endurance;Decreased self-care trans;Decreased high-level ADLs  (decreased balance and mobility)   Prognosis Good   Assessment Patient evaluated by Occupational Therapy  Acute on chronic respiratory failure with hypoxia and hypercapnia ([facility] [address] )  The patients occupational profile, medical and therapy history includes a extensive additional review of physical, cognitive, or psychosocial history related to current functional performance    Comorbidities affecting functional mobility and ADLS include: sthma, DM, GERD, HLD, Afib, CKD, anemia, Covid 19, CHF, humeral fracture s/p L reverse TSA 3/1/22  Asthma, lower leg edema, mixed hyperlipidemia, essential hypertension, SEBASTIAN, dm 2, total shoulder replacement left, UTI, obesity, hypoventilation syndrome, humeral head/closed 4 part proximal humerus fracture,and arthritis     Prior to admission, patient was independent with functional mobility without AD, requiring assist for ADLS,and requiring assist for IADLS  The evaluation identifies the following performance deficits: weakness, decreased ROM, decreased endurance, increased fall risk, decreased ADLS, decreased IADLS, pain, decreased activity tolerance, decreased safety awareness and decreased strength, that result in activity limitations and/or participation restrictions  This evaluation requires clinical decision making of high complexity, because the patient presents with comorbidites that affect occupational performance and required significant modification of tasks or assistance with consideration of multiple treatment options  The Barthel Index was used as a functional outcome tool presenting with a score of Barthel Index Score: 50, indicating marked limitations of functional mobility and ADLS  The patient's raw score on the AM-PAC Daily Activity inpatient short form is 16, standardized score is 35 96, which is less than 39 4  Patients at this level are likely to benefit from DC to post-acute rehabilitation services  However, pt should be safe to go home with 24/7 family support and resume home OT as was PLOF  Patient will benefit from skilled Occupational Therapy services to address above deficits and facilitate a safe return to prior level of function     Goals   Patient Goals "get home and start out-patient therapy"   STG Time Frame   (1-7)   Short Term Goal #1 Patient will increase standing tolerance to 5 minutes during ADL task to decrease assistance level and decrease fall risk; Patient will increase bed mobility to supervision in preparation for ADLS and transfers; Patient will increase functional mobility to and from bathroom with vin-walker with supervision to increase performance with ADLS and to use a toilet; Patient will tolerate 10 minutes of UE ROM/strengthening per shoulder protocol to increase general activity tolerance and performance in ADLS/IADLS; Patient will improve functional activity tolerance to 10 minutes of sustained functional tasks to increase participation in basic self-care and decrease assistance level;  Patient will be able to to verbalize understanding and perform energy conservation/proper body mechanics during ADLS and functional mobility at least 75% of the time with minimal cueing to decrease signs of fatigue and increase stamina to return to prior level of function; Patient will increase dynamic sitting balance to fair+ to improve the ability to sit at edge of bed or on a chair for ADLS;  Patient will increase dynamic standing balance to fair to improve postural stability and decrease fall risk during standing ADLS and transfers  LTG Time Frame   (8-14)   Long Term Goal #1 Patient will increase standing tolerance to 10 minutes during ADL task to decrease assistance level and decrease fall risk; Patient will increase bed mobility to independent in preparation for ADLS and transfers;  Patient will increase functional mobility to and from bathroom with vin-walker independently to increase performance with ADLS and to use a toilet; Patient will tolerate 15 minutes of UE ROM/strengthening per rehab protocol to increase general activity tolerance and performance in ADLS/IADLS; Patient will improve functional activity tolerance to 15 minutes of sustained functional tasks to increase participation in basic self-care and decrease assistance level;  Patient will be able to to verbalize understanding and perform energy conservation/proper body mechanics during ADLS and functional mobility at least 90% of the time with no cueing to decrease signs of fatigue and increase stamina to return to prior level of function; Patient will increase static/dynamic sitting balance to good to improve the ability to sit at edge of bed or on a chair for ADLS;  Patient will increase static/dynamic standing balance to fair+ to improve postural stability and decrease fall risk during standing ADLS and transfers  Pt will score >/= 21/24 on AM-PAC Daily Activity Inpatient scale to promote safe independence with ADLs and functional mobility; Pt will score >/= 65/100 on Barthel Index in order to decrease caregiver assistance needed and increase ability to perform ADLs and functional mobility  Functional Transfer Goals   Pt Will Perform All Functional Transfers With min assist;With assistive devices; With good judgment/safety  (LTG- Independent)   ADL Goals   Pt Will Perform Bathing With min assist;In shower/tub seat; With good judgment/safety; With setup; With cues  (LTG- Supervision)   Pt Will Perform UE Dressing In chair; With stand by assist;With setup; With cues  (LTG- Independent)   Pt Will Perform LE Dressing In chair; With min assist;With adaptive equipment; With cues  (LTG- Independent)   Pt Will Perform Toileting With bedside commode; With stand by assist;With cues; With setup  (LTG- Independent in the bathroom)   Plan   Treatment Interventions ADL retraining;Functional transfer training;UE strengthening/ROM; Endurance training;Patient/family training;Equipment evaluation/education; Fine motor coordination activities; Compensatory technique education; Energy conservation; Activityengagement   Goal Expiration Date 06/02/22   OT Frequency 3-5x/wk   Additional Treatment Session   Start Time 1425   End Time 1440   Treatment Assessment Pt seen for ADL training   Education and training with teachback method begun with pt regarding energy conservation/proper body mechanics during ADLS and functional mobility to decrease fall risks, decrease signs of fatigue and increase stamina needed to return to prior level of function  Transfer to bedside commode with Jose handhold and cues  Toileting with ModA for clothing management, perineal hygiene after BM and steadying  Transfer back to bed with Jose armhold and cues  Moderate SOB and drop in SpO2 with exertion requiring cues for deep, pursed lip breathing, but recovered quickly with rest  Reviewed previous LUE HEP and encouraged pt to continue on her own between OT sessions  Pt demonstrating good verbal understanding of all information provided and all questions answered  Patient returned to bed with all needs within reach, SCD pumps, and bed alarm in place  Continue OT per POC  Recommendation   OT Discharge Recommendation Home with home health rehabilitation   AM-PAC Daily Activity Inpatient   Lower Body Dressing 2   Bathing 2   Toileting 2   Upper Body Dressing 3   Grooming 3   Eating 4   Daily Activity Raw Score 16   Daily Activity Standardized Score (Calc for Raw Score >=11) 35 96   AM-PAC Applied Cognition Inpatient   Following a Speech/Presentation 4   Understanding Ordinary Conversation 4   Taking Medications 3   Remembering Where Things Are Placed or Put Away 3   Remembering List of 4-5 Errands 3   Taking Care of Complicated Tasks 3   Applied Cognition Raw Score 20   Applied Cognition Standardized Score 41 76   Barthel Index   Feeding 5   Bathing 0   Grooming Score 0   Dressing Score 5   Bladder Score 10   Bowels Score 10   Toilet Use Score 5   Transfers (Bed/Chair) Score 10   Mobility (Level Surface) Score 0   Stairs Score 0   Barthel Index Score 45   Licensure   [place] License Number  [place] Tony Masseylin Sender [address], OTR/L, Milind Crawley Lic# [id number]

## 2022-05-18 NOTE — PROGRESS NOTES
[facility] Progress Note  Patient: Jackelyn Chavez 73 y o  female   MRN: [id number]  PCP: Ian Vanegas MD  Unit/Bed#: ICU 02 Encounter: [id number]  Date Of Visit: 05/18/22    Problem List:    Principal Problem:    Acute on chronic respiratory failure with hypoxia and hypercapnia ([facility])  Active Problems:    Acute on chronic diastolic CHF (congestive heart failure) ([facility] )    Diabetes mellitus, type 2 ([facility] )    Acute kidney injury on CKD stage 3    Atrial fibrillation with rapid ventricular response ([facility])    Obesity hypoventilation syndrome ([facility])    Asthma    Mixed hyperlipidemia    Essential hypertension    Status post reverse total replacement of left shoulder    UTI (urinary tract infection)    Lower leg edema    Morbid obesity ([place] [address] )    Positive blood culture      Assessment & Plan:    Acute on chronic diastolic CHF (congestive heart failure) ([facility])  Assessment & Plan  Wt Readings from Last 3 Encounters:   05/18/22 117 kg (257 lb 4 4 oz)   05/13/22 116 kg (255 lb 8 2 oz)   04/28/22 121 kg (267 lb)     Presented with increasing shortness of breath, palpitation, edema  Chest x-ray with evidence of vascular congestion  Echocardiogram EF 50%  Volume status improving with diuresis  · Continue torsemide  · Monitor intake output, daily weight  · Cardiology evaluation        * Acute on chronic respiratory failure with hypoxia and hypercapnia ([facility] )  Assessment & Plan  Patient presented with lethargy, reported increasing shortness of breath, chest discomfort  Initial VBG 7 09/127/55/38  Chest x-ray with evidence of vascular congestion, bilateral effusion  Likely secondary to CHF, obesity hypoventilation contributing  · Improved with diuresis and BiPAP  · Continue diuresis  · Supplemental oxygen, taper as tolerated  · Pulmonary follow-up regarding need for BiPAP on discharge for obesity hypoventilation    Obesity hypoventilation syndrome ([facility] )  Assessment & Plan  Likely contributed to hypercapnia  Pulmonary following  ABG-7 37/62/92/96 on 3 5 L  Follow-up bedside spirometry, pulse oximetry  Patient may require BiPAP q h s  On discharge    Atrial fibrillation with rapid ventricular response ([address] )  Assessment & Plan  On presentation noted to be AFib with RVR, likely predisposing CHF decompensation  Recently seen by Cardiology for palpitation and was ordered a outpatient monitoring    Recent ER visit for the same  · Heart rate improved at present  · Continue metoprolol, Eliquis  · Will get Cardiology evaluation    Acute kidney injury on CKD stage 3  Assessment & Plan  Baseline creatinine appears to be 1 4-1 6  Presented with creatinine of 2 1-recent Bactrim use  Improving with diuresis  · Follow-up BMP, electrolytes  · Monitor intake output  · Monitor with diuresis, may require higher baseline to maintain euvolemia    Diabetes mellitus, type 2 [facility])  Assessment & Plan  Lab Results   Component Value Date    HGBA1C 6 8 (H) 03/09/2022       Recent Labs     05/17/22  1645 05/17/22  2058 05/18/22  1126 05/18/22  1640   POCGLU 173* 187* 264* 142*       Blood Sugar Average: Last 72 hrs:  (P) 217 2     Continue sliding scale  Resume Lantus 25 units q h s     UTI (urinary tract infection)  Assessment & Plan  Suspected during recent ER visit, urine culture with growth of mixed contaminants  Patient denies any dysuria  Discontinue ceftriaxone    Status post reverse total replacement of left shoulder  Assessment & Plan  PT/OT    Essential hypertension  Assessment & Plan   continue Lopressor, Demadex    Mixed hyperlipidemia  Assessment & Plan  On statin    Asthma  Assessment & Plan  Continue bronchodilators as needed    Positive blood culture  Assessment & Plan  1/2 blood culture positive for coagulase-negative Staph  Likely contaminant  No further workup    Morbid obesity ([facility])  Assessment & Plan  With BMI of 47    Lower leg edema  Assessment & Plan  Continue torsemide  Monitor intake output, daily weight    Acute encephalopathy-resolved as of 2022  Assessment & Plan  Secondary to hypercapnic respiratory failure  CT head without any acute abnormality  Now improved with BiPAP          VTE Pharmacologic Prophylaxis: VTE Score: 8 High Risk (Score >/= 5) - Pharmacological DVT Prophylaxis Ordered: apixaban (Eliquis)  Sequential Compression Devices Ordered  Patient Centered Rounds: I performed bedside rounds with nursing staff today  Discussions with Specialists or Other Care Team Provider:  Pulmonary    Education and Discussions with Family / Patient: Updated  (niece) via phone  Time Spent for Care: 45 minutes  More than 50% of total time spent on counseling and coordination of care as described above  Current Length of Stay: 2 day(s)  Current Patient Status: Inpatient   Certification Statement: The patient will continue to require additional inpatient hospital stay due to Respiratory failure  Discharge Plan: Anticipate discharge in 48-72 hrs to home with home services  Code Status: Level 1 - Full Code    Subjective:   Sitting up in chair  Reports that her breathing is overall improved but still with dyspnea with activity  Mild cough noted  Denies any fever or chest pain  Denies any dizziness or palpitation at present    Reports good urine output with diuretics      Objective:     Vitals:   Temp (24hrs), Av 9 °F (36 6 °C), Min:97 8 °F (36 6 °C), Max:98 1 °F (36 7 °C)    Temp:  [97 8 °F (36 6 °C)-98 1 °F (36 7 °C)] 97 8 °F (36 6 °C)  HR:  [68-94] 94  Resp:  [18-20] 20  BP: (112-131)/(62-72) 124/63  SpO2:  [96 %-99 %] 96 % on 2 L at rest  Body mass index is 47 06 kg/m²  Input and Output Summary (last 24 hours): Intake/Output Summary (Last 24 hours) at 2022 0942  Last data filed at 2022 0901  Gross per 24 hour   Intake 410 ml   Output 1700 ml   Net -1290 ml       Physical Exam:   Physical Exam  Constitutional:       General: She is not in acute distress  Appearance: She is obese  HENT:      Head: Normocephalic and atraumatic  Cardiovascular:      Rate and Rhythm: Normal rate  Pulmonary:      Effort: Pulmonary effort is normal  No respiratory distress  Breath sounds: Normal breath sounds  No wheezing or rales  Abdominal:      General: Bowel sounds are normal  There is no distension  Palpations: Abdomen is soft  Tenderness: There is no abdominal tenderness  There is no guarding or rebound  Musculoskeletal:      Right lower leg: Edema present  Left lower leg: Edema present  Skin:     General: Skin is warm and dry  Findings: No rash  Neurological:      General: No focal deficit present  Mental Status: She is alert and oriented to person, place, and time  Mental status is at baseline           Additional Data:     Labs:  Results from last 7 days   Lab Units 05/18/22  0523   WBC Thousand/uL 6 77   HEMOGLOBIN g/dL 8 9*   HEMATOCRIT % 30 5*   PLATELETS Thousands/uL 169   NEUTROS PCT % 54   LYMPHS PCT % 29   MONOS PCT % 11   EOS PCT % 5     Results from last 7 days   Lab Units 05/18/22  0523 05/17/22  0548   SODIUM mmol/L 142 144   POTASSIUM mmol/L 4 9 5 0   CHLORIDE mmol/L 102 103   CO2 mmol/L 37* 37*   BUN mg/dL 42* 46*   CREATININE mg/dL 1 74* 1 97*   ANION GAP mmol/L 3* 4   CALCIUM mg/dL 8 8 8 6   ALBUMIN g/dL  --  2 9*   TOTAL BILIRUBIN mg/dL  --  0 22   ALK PHOS U/L  --  59   ALT U/L  --  15   AST U/L  --  13   GLUCOSE RANDOM mg/dL 177* 123     Results from last 7 days   Lab Units 05/13/22  0027   INR  1 18     Results from last 7 days   Lab Units 05/17/22  2058 05/17/22  1645 05/17/22  1124 05/17/22  0548 05/17/22  0027 05/16/22  1800 05/16/22  1436 05/16/22  1310   POC GLUCOSE mg/dl 187* 173* 205* 137 137 274* 321* 332*         Results from last 7 days   Lab Units 05/18/22  0523 05/17/22  0548 05/16/22  0901   PROCALCITONIN ng/ml 0 73* 1 15* 0 07       Lines/Drains:  Invasive Devices  Report    Peripheral Intravenous Line Duration           Peripheral IV 05/18/22 Left; Lower Arm <1 day                  Telemetry:  Telemetry Orders (From admission, onward)             48 Hour Telemetry Monitoring  Continuous x 48 hours        Expiring   References:    Telemetry Guidelines   Question:  Reason for 48 Hour Telemetry  Answer:  Acute Decompensated CHF (continuous diuretic infusion or total diuretic dose > 200 mg daily, associated electrolyte derangement, ionotropic drip, history of ventricular arrhythmia, or new EF <35%)                          Imaging: Reviewed radiology reports from this admission including: chest xray and CT head    Recent Cultures (last 7 days):   Results from last 7 days   Lab Units 05/16/22  1904 05/16/22  1215 05/13/22  0218   BLOOD CULTURE   --  No Growth at 24 hrs   --    GRAM STAIN RESULT   --  Gram positive cocci in clusters*  --    URINE CULTURE   --   --  50,000-59,000 cfu/ml    LEGIONELLA URINARY ANTIGEN  Negative  --   --        Last 24 Hours Medication List:   Current Facility-Administered Medications   Medication Dose Route Frequency Provider Last Rate    acetaminophen  650 mg Oral Q6H PRN Dane Christianson PA-C      albuterol  2 puff Inhalation Q6H PRN Kamlesh Valadez MD      apixaban  5 mg Oral BID Euel Payment, MD      cefTRIAXone  1,000 mg Intravenous Q24H Euel Payment, MD Stopped (05/17/22 1220)    docusate sodium  100 mg Oral Daily Euel Payment, MD      insulin lispro  2-12 Units Subcutaneous 4x Daily (AC & HS) RK Vee      ipratropium  0 5 mg Nebulization TID Euel Payment, MD      levalbuterol  1 25 mg Nebulization TID Eucaro Payment, MD      metoprolol tartrate  25 mg Oral Q12H [facility] Eucaro Payment, MD      nystatin   Topical BID Euel Payment, MD      pravastatin  80 mg Oral Daily With Sven Lester MD      torsemide  20 mg Oral Daily Eucaro Payment, MD          Today, Patient Was Seen By: Destinee Ferraro MD    ** Please Note: "This note has been constructed using a voice recognition system  Therefore there may be syntax, spelling, and/or grammatical errors   Please call if you have any questions  "**

## 2022-05-19 ENCOUNTER — TELEPHONE (OUTPATIENT)
Dept: CARDIOLOGY CLINIC | Facility: CLINIC | Age: 72
End: 2022-05-19

## 2022-05-19 PROBLEM — R04.0 EPISTAXIS: Status: ACTIVE | Noted: 2022-05-19

## 2022-05-19 PROBLEM — N39.0 UTI (URINARY TRACT INFECTION): Status: RESOLVED | Noted: 2022-05-16 | Resolved: 2022-05-19

## 2022-05-19 LAB
ANION GAP SERPL CALCULATED.3IONS-SCNC: 2 MMOL/L (ref 4–13)
BACTERIA BLD CULT: ABNORMAL
BASOPHILS # BLD AUTO: 0.08 THOUSANDS/ΜL (ref 0–0.1)
BASOPHILS NFR BLD AUTO: 1 % (ref 0–1)
BUN SERPL-MCNC: 36 MG/DL (ref 5–25)
CALCIUM SERPL-MCNC: 8.5 MG/DL (ref 8.3–10.1)
CHLORIDE SERPL-SCNC: 103 MMOL/L (ref 100–108)
CO2 SERPL-SCNC: 38 MMOL/L (ref 21–32)
CREAT SERPL-MCNC: 1.49 MG/DL (ref 0.6–1.3)
EOSINOPHIL # BLD AUTO: 0.51 THOUSAND/ΜL (ref 0–0.61)
EOSINOPHIL NFR BLD AUTO: 7 % (ref 0–6)
ERYTHROCYTE [DISTWIDTH] IN BLOOD BY AUTOMATED COUNT: 14.5 % (ref 11.6–15.1)
GFR SERPL CREATININE-BSD FRML MDRD: 35 ML/MIN/1.73SQ M
GLUCOSE SERPL-MCNC: 129 MG/DL (ref 65–140)
GLUCOSE SERPL-MCNC: 175 MG/DL (ref 65–140)
GLUCOSE SERPL-MCNC: 182 MG/DL (ref 65–140)
GLUCOSE SERPL-MCNC: 211 MG/DL (ref 65–140)
GLUCOSE SERPL-MCNC: 248 MG/DL (ref 65–140)
GRAM STN SPEC: ABNORMAL
HCT VFR BLD AUTO: 30.6 % (ref 34.8–46.1)
HGB BLD-MCNC: 9.1 G/DL (ref 11.5–15.4)
IMM GRANULOCYTES # BLD AUTO: 0.02 THOUSAND/UL (ref 0–0.2)
IMM GRANULOCYTES NFR BLD AUTO: 0 % (ref 0–2)
LYMPHOCYTES # BLD AUTO: 1.94 THOUSANDS/ΜL (ref 0.6–4.47)
LYMPHOCYTES NFR BLD AUTO: 28 % (ref 14–44)
MAGNESIUM SERPL-MCNC: 2.1 MG/DL (ref 1.6–2.6)
MCH RBC QN AUTO: 28 PG (ref 26.8–34.3)
MCHC RBC AUTO-ENTMCNC: 29.7 G/DL (ref 31.4–37.4)
MCV RBC AUTO: 94 FL (ref 82–98)
MECA+MECC ISLT/SPM QL: DETECTED
MONOCYTES # BLD AUTO: 0.78 THOUSAND/ΜL (ref 0.17–1.22)
MONOCYTES NFR BLD AUTO: 11 % (ref 4–12)
NEUTROPHILS # BLD AUTO: 3.52 THOUSANDS/ΜL (ref 1.85–7.62)
NEUTS SEG NFR BLD AUTO: 53 % (ref 43–75)
NRBC BLD AUTO-RTO: 0 /100 WBCS
PLATELET # BLD AUTO: 184 THOUSANDS/UL (ref 149–390)
PMV BLD AUTO: 10.8 FL (ref 8.9–12.7)
POTASSIUM SERPL-SCNC: 4.5 MMOL/L (ref 3.5–5.3)
PROCALCITONIN SERPL-MCNC: 0.51 NG/ML
RBC # BLD AUTO: 3.25 MILLION/UL (ref 3.81–5.12)
S EPIDERMIDIS DNA BLD POS QL NAA+NON-PRB: DETECTED
SODIUM SERPL-SCNC: 143 MMOL/L (ref 136–145)
WBC # BLD AUTO: 6.85 THOUSAND/UL (ref 4.31–10.16)

## 2022-05-19 PROCEDURE — 80048 BASIC METABOLIC PNL TOTAL CA: CPT | Performed by: INTERNAL MEDICINE

## 2022-05-19 PROCEDURE — 94640 AIRWAY INHALATION TREATMENT: CPT

## 2022-05-19 PROCEDURE — 85025 COMPLETE CBC W/AUTO DIFF WBC: CPT | Performed by: INTERNAL MEDICINE

## 2022-05-19 PROCEDURE — 99232 SBSQ HOSP IP/OBS MODERATE 35: CPT | Performed by: INTERNAL MEDICINE

## 2022-05-19 PROCEDURE — 82948 REAGENT STRIP/BLOOD GLUCOSE: CPT

## 2022-05-19 PROCEDURE — 99222 1ST HOSP IP/OBS MODERATE 55: CPT | Performed by: INTERNAL MEDICINE

## 2022-05-19 PROCEDURE — 83735 ASSAY OF MAGNESIUM: CPT | Performed by: INTERNAL MEDICINE

## 2022-05-19 PROCEDURE — 84145 PROCALCITONIN (PCT): CPT | Performed by: INTERNAL MEDICINE

## 2022-05-19 PROCEDURE — 94760 N-INVAS EAR/PLS OXIMETRY 1: CPT

## 2022-05-19 RX ORDER — OXYMETAZOLINE HYDROCHLORIDE 0.05 G/100ML
2 SPRAY NASAL EVERY 12 HOURS SCHEDULED
Status: DISPENSED | OUTPATIENT
Start: 2022-05-19 | End: 2022-05-20

## 2022-05-19 RX ORDER — FUROSEMIDE 10 MG/ML
40 INJECTION INTRAMUSCULAR; INTRAVENOUS
Status: DISCONTINUED | OUTPATIENT
Start: 2022-05-19 | End: 2022-05-22

## 2022-05-19 RX ADMIN — INSULIN GLARGINE 25 UNITS: 100 INJECTION, SOLUTION SUBCUTANEOUS at 21:47

## 2022-05-19 RX ADMIN — PRAVASTATIN SODIUM 80 MG: 80 TABLET ORAL at 17:11

## 2022-05-19 RX ADMIN — APIXABAN 5 MG: 5 TABLET, FILM COATED ORAL at 17:11

## 2022-05-19 RX ADMIN — LEVALBUTEROL HYDROCHLORIDE 1.25 MG: 1.25 SOLUTION, CONCENTRATE RESPIRATORY (INHALATION) at 19:15

## 2022-05-19 RX ADMIN — INSULIN LISPRO 2 UNITS: 100 INJECTION, SOLUTION INTRAVENOUS; SUBCUTANEOUS at 08:55

## 2022-05-19 RX ADMIN — IPRATROPIUM BROMIDE 0.5 MG: 0.5 SOLUTION RESPIRATORY (INHALATION) at 13:12

## 2022-05-19 RX ADMIN — IPRATROPIUM BROMIDE 0.5 MG: 0.5 SOLUTION RESPIRATORY (INHALATION) at 19:15

## 2022-05-19 RX ADMIN — IPRATROPIUM BROMIDE 0.5 MG: 0.5 SOLUTION RESPIRATORY (INHALATION) at 07:17

## 2022-05-19 RX ADMIN — NYSTATIN 1 APPLICATION: 100000 POWDER TOPICAL at 17:12

## 2022-05-19 RX ADMIN — METOPROLOL TARTRATE 25 MG: 25 TABLET, FILM COATED ORAL at 08:56

## 2022-05-19 RX ADMIN — LEVALBUTEROL HYDROCHLORIDE 1.25 MG: 1.25 SOLUTION, CONCENTRATE RESPIRATORY (INHALATION) at 13:12

## 2022-05-19 RX ADMIN — TORSEMIDE 20 MG: 20 TABLET ORAL at 08:56

## 2022-05-19 RX ADMIN — LEVALBUTEROL HYDROCHLORIDE 1.25 MG: 1.25 SOLUTION, CONCENTRATE RESPIRATORY (INHALATION) at 07:18

## 2022-05-19 RX ADMIN — NYSTATIN: 100000 POWDER TOPICAL at 11:57

## 2022-05-19 RX ADMIN — INSULIN LISPRO 4 UNITS: 100 INJECTION, SOLUTION INTRAVENOUS; SUBCUTANEOUS at 21:47

## 2022-05-19 RX ADMIN — OXYMETAZOLINE HYDROCHLORIDE 2 SPRAY: 0.05 SPRAY NASAL at 11:57

## 2022-05-19 RX ADMIN — FUROSEMIDE 40 MG: 10 INJECTION, SOLUTION INTRAMUSCULAR; INTRAVENOUS at 17:11

## 2022-05-19 RX ADMIN — METOPROLOL TARTRATE 25 MG: 25 TABLET, FILM COATED ORAL at 21:46

## 2022-05-19 RX ADMIN — INSULIN LISPRO 4 UNITS: 100 INJECTION, SOLUTION INTRAVENOUS; SUBCUTANEOUS at 11:57

## 2022-05-19 NOTE — ASSESSMENT & PLAN NOTE
On presentation noted to be AFib with RVR, likely predisposing CHF decompensation  Recently seen by Cardiology for palpitation and was ordered a outpatient monitoring    Recent ER visit for the same  · Heart rate improved at present  · Continue metoprolol, increase to 50 mg q 12  · Continue Eliquis  · Follow up Cardiology recommendations

## 2022-05-19 NOTE — ASSESSMENT & PLAN NOTE
Wt Readings from Last 3 Encounters:   05/23/22 112 kg (247 lb 5 7 oz)   05/13/22 116 kg (255 lb 8 2 oz)   04/28/22 121 kg (267 lb)     Presented with increasing shortness of breath, palpitation, edema  Chest x-ray with evidence of vascular congestion  Echocardiogram EF 50%  Volume status improved significantly with diuresis,  Seen and followed by Cardiology, input appreciated  · Patient will be transition to torsemide 20 mg daily on discharge  · Continue metoprolol  · Patient was advised to continue to monitor daily weight as before  · BMP in 1 week  · Follow-up with Cardiology

## 2022-05-19 NOTE — ASSESSMENT & PLAN NOTE
Lab Results   Component Value Date    HGBA1C 6 8 (H) 03/09/2022       Recent Labs     05/22/22  1626 05/22/22  2106 05/23/22  0729 05/23/22  1104   POCGLU 148* 202* 130 176*       Blood Sugar Average: Last 72 hrs:  (P) 427 [id number]     Continue Lantus 25 units q h s  on discharge with sliding scale as she was doing before  Will recommend to monitor blood glucose

## 2022-05-19 NOTE — RESPIRATORY THERAPY NOTE
Attempted bipap with patient, she refused the full face mask since she is claustrophobic  Attempted the nasal mask, didn't get the mask on all the way before patient stated she could not tolerate and refused  Explained to patient how this will make her co2 high and make her stay here potentially longer  Sent tiger text to Dr Aretha Cardenas advising   Also advised RN    Spoke with night hospitalist Dr Kalpesh Holloway, advised pt refused bipap

## 2022-05-19 NOTE — PROGRESS NOTES
[facility] Progress Note  Patient: Maxine Daigle 73 y o  female   MRN: [id number]  PCP: Mely Reddy MD  Unit/Bed#: 2 [place] [address] Encounter: [id number]  Date Of Visit: 05/19/22    Problem List:    Principal Problem:    Acute on chronic respiratory failure with hypoxia and hypercapnia ([facility])  Active Problems:    Acute on chronic diastolic CHF (congestive heart failure) ([place] [address] )    Diabetes mellitus, type 2 ([place] [address] )    Acute kidney injury on CKD stage 3    Atrial fibrillation with rapid ventricular response ([facility])    Obesity hypoventilation syndrome ([facility])    Asthma    Mixed hyperlipidemia    Essential hypertension    Status post reverse total replacement of left shoulder    Epistaxis    Lower leg edema    Morbid obesity ([place] [address] )    Positive blood culture      Assessment & Plan:    Acute on chronic diastolic CHF (congestive heart failure) ([facility])  Assessment & Plan  Wt Readings from Last 3 Encounters:   05/19/22 115 kg (252 lb 12 8 oz)   05/13/22 116 kg (255 lb 8 2 oz)   04/28/22 121 kg (267 lb)     Presented with increasing shortness of breath, palpitation, edema  Chest x-ray with evidence of vascular congestion  Echocardiogram EF 50%  Volume status improving with diuresis  Cardiology input appreciated  · Continue diuresis with IV Lasix b i d   · Monitor intake output, daily weight  · Follow up Cardiology recommendations        * Acute on chronic respiratory failure with hypoxia and hypercapnia ([place] [address] )  Assessment & Plan  Patient presented with lethargy, reported increasing shortness of breath, chest discomfort  Initial VBG 7 09/127/55/38  Chest x-ray with evidence of vascular congestion, bilateral effusion  Likely secondary to CHF, obesity hypoventilation contributing  · Improved with diuresis and BiPAP  · Continue diuresis  · Supplemental oxygen, taper as tolerated  · Pulmonary follow-up regarding need for BiPAP on discharge for obesity hypoventilation    Obesity hypoventilation syndrome ([place] [address] )  Assessment & Plan  Likely contributed to hypercapnia  Pulmonary following  ABG-7 37/62/92/96 on 3 5 L  Follow-up bedside spirometry, pulse oximetry  Patient may require BiPAP q h s  On discharge    Atrial fibrillation with rapid ventricular response ([address] )  Assessment & Plan  On presentation noted to be AFib with RVR, likely predisposing CHF decompensation  Recently seen by Cardiology for palpitation and was ordered a outpatient monitoring  Recent ER visit for the same  · Heart rate improved at present  · Continue metoprolol, Eliquis  · Follow up Cardiology recommendations    Acute kidney injury on CKD stage 3  Assessment & Plan  Baseline creatinine appears to be 1 4-1 6  Presented with creatinine of 2 1-recent Bactrim use  Improving with diuresis  · Follow-up BMP, electrolytes  · Monitor intake output  · Monitor with diuresis, may require higher baseline to maintain euvolemia    Diabetes mellitus, type 2 [facility])  Assessment & Plan  Lab Results   Component Value Date    HGBA1C 6 8 (H) 03/09/2022       Recent Labs     05/19/22  0731 05/19/22  1109 05/19/22  1559 05/19/22  2138   POCGLU 182* 248* 129 211*       Blood Sugar Average: Last 72 hrs:  (P) 441 [id number]     Continue sliding scale  Continue Lantus 25 units q h s      Epistaxis  Assessment & Plan  Noted spontaneously on 05/19  · Improved with local measures, oxymetazoline  · Humidification  · Monitor    Status post reverse total replacement of left shoulder  Assessment & Plan  PT/OT    Essential hypertension  Assessment & Plan   continue Lopressor    Mixed hyperlipidemia  Assessment & Plan  On statin    Asthma  Assessment & Plan  Continue bronchodilators as needed    UTI (urinary tract infection)-resolved as of 5/19/2022  Assessment & Plan  Suspected during recent ER visit, urine culture with growth of mixed contaminants  Patient denies any dysuria  Discontinue ceftriaxone    Positive blood culture  Assessment & Plan  1/2 blood culture positive for coagulase-negative Staph  Likely contaminant  No further workup    Morbid obesity ([facility] )  Assessment & Plan  With BMI of 47    Lower leg edema  Assessment & Plan  Continue diuretics  Monitor intake output, daily weight    Acute encephalopathy-resolved as of 2022  Assessment & Plan  Secondary to hypercapnic respiratory failure  CT head without any acute abnormality  Now improved with BiPAP          VTE Pharmacologic Prophylaxis: VTE Score: 8 High Risk (Score >/= 5) - Pharmacological DVT Prophylaxis Ordered: apixaban (Eliquis)  Sequential Compression Devices Ordered  Patient Centered Rounds: I performed bedside rounds with nursing staff today  Discussions with Specialists or Other Care Team Provider:  Pulmonary, Cardiology    Education and Discussions with Family / Patient: Updated  (niece) via phone  Time Spent for Care: 45 minutes  More than 50% of total time spent on counseling and coordination of care as described above  Current Length of Stay: 3 day(s)  Current Patient Status: Inpatient   Certification Statement: The patient will continue to require additional inpatient hospital stay due to Respiratory failure  Discharge Plan: Anticipate discharge in 48-72 hrs to home with home services  Code Status: Level 1 - Full Code    Subjective:   Sitting up in chair  Breathing is improving but still short of breath with activity  Epistaxis earlier today which improved after local measures  Denies any fever or chest pain  Denies any dizziness or palpitation at present    Unable to tolerate BiPAP      Objective:     Vitals:   Temp (24hrs), Av 3 °F (36 8 °C), Min:97 5 °F (36 4 °C), Max:98 7 °F (37 1 °C)    Temp:  [97 5 °F (36 4 °C)-98 7 °F (37 1 °C)] 98 °F (36 7 °C)  HR:  [] 77  Resp:  [18-20] 18  BP: (142-164)/(67-78) 150/71  SpO2:  [95 %-100 %] 98 % on 2 L at rest  Body mass index is 47 06 kg/m²  Input and Output Summary (last 24 hours):      Intake/Output Summary (Last 24 hours) at 2022 7835  Last data filed at 5/18/2022 1300  Gross per 24 hour   Intake 290 ml   Output 375 ml   Net -85 ml       Physical Exam:   Physical Exam  Constitutional:       General: She is not in acute distress  Appearance: She is obese  HENT:      Head: Normocephalic and atraumatic  Cardiovascular:      Rate and Rhythm: Normal rate  Pulmonary:      Effort: Pulmonary effort is normal  No respiratory distress  Breath sounds: Rales present  No wheezing  Comments: Diminished  Abdominal:      General: Bowel sounds are normal  There is no distension  Palpations: Abdomen is soft  Tenderness: There is no abdominal tenderness  There is no guarding or rebound  Musculoskeletal:      Right lower leg: Edema present  Left lower leg: Edema present  Skin:     General: Skin is warm and dry  Findings: No rash  Neurological:      Mental Status: She is alert  Additional Data:     Labs:  Results from last 7 days   Lab Units 05/19/22  0427   WBC Thousand/uL 6 85   HEMOGLOBIN g/dL 9 1*   HEMATOCRIT % 30 6*   PLATELETS Thousands/uL 184   NEUTROS PCT % 53   LYMPHS PCT % 28   MONOS PCT % 11   EOS PCT % 7*     Results from last 7 days   Lab Units 05/19/22  0427 05/18/22  0523 05/17/22  0548   SODIUM mmol/L 143   < > 144   POTASSIUM mmol/L 4 5   < > 5 0   CHLORIDE mmol/L 103   < > 103   CO2 mmol/L 38*   < > 37*   BUN mg/dL 36*   < > 46*   CREATININE mg/dL 1 49*   < > 1 97*   ANION GAP mmol/L 2*   < > 4   CALCIUM mg/dL 8 5   < > 8 6   ALBUMIN g/dL  --   --  2 9*   TOTAL BILIRUBIN mg/dL  --   --  0 22   ALK PHOS U/L  --   --  59   ALT U/L  --   --  15   AST U/L  --   --  13   GLUCOSE RANDOM mg/dL 175*   < > 123    < > = values in this interval not displayed       Results from last 7 days   Lab Units 05/13/22  0027   INR  1 18     Results from last 7 days   Lab Units 05/19/22  0731 05/18/22  2204 05/18/22  1640 05/18/22  1126 05/17/22  2058 05/17/22  1645 05/17/22  1124 05/17/22  0548 05/17/22  8993 05/16/22  1800 05/16/22  1436 05/16/22  1310   POC GLUCOSE mg/dl 182* 237* 142* 264* 187* 173* 205* 137 137 274* 321* 332*         Results from last 7 days   Lab Units 05/19/22  0427 05/18/22  0523 05/17/22  0548 05/16/22  0901   PROCALCITONIN ng/ml 0 51* 0 73* 1 15* 0 07       Lines/Drains:  Invasive Devices  Report    Peripheral Intravenous Line  Duration           Peripheral IV 05/18/22 Left; Lower Arm 1 day                  Telemetry:  Telemetry Orders (From admission, onward)             48 Hour Telemetry Monitoring  Continuous x 48 hours        Expiring   References:    Telemetry Guidelines   Question:  Reason for 48 Hour Telemetry  Answer:  Acute Decompensated CHF (continuous diuretic infusion or total diuretic dose > 200 mg daily, associated electrolyte derangement, ionotropic drip, history of ventricular arrhythmia, or new EF <35%)                          Imaging: Reviewed radiology reports from this admission including: chest xray and CT head    Recent Cultures (last 7 days):   Results from last 7 days   Lab Units 05/16/22  1904 05/16/22  1215 05/13/22  0218   BLOOD CULTURE   --  No Growth at 48 hrs    Staphylococcus coagulase negative*  --    GRAM STAIN RESULT   --  Gram positive cocci in clusters*  --    URINE CULTURE   --   --  50,000-59,000 cfu/ml    LEGIONELLA URINARY ANTIGEN  Negative  --   --        Last 24 Hours Medication List:   Current Facility-Administered Medications   Medication Dose Route Frequency Provider Last Rate    acetaminophen  650 mg Oral Q6H PRN Criselda Dow MD      albuterol  2 puff Inhalation Q6H PRN Criselda Dow MD      apixaban  5 mg Oral BID Criselda Dow MD      docusate sodium  100 mg Oral Daily Criselda Dow MD      insulin glargine  25 Units Subcutaneous HS Criselda Dow MD      insulin lispro  2-12 Units Subcutaneous 4x Daily (AC & HS) Criselda Dow MD      ipratropium  0 5 mg Nebulization TID Criselda Dow MD      levalbuterol  1 25 mg Nebulization TID Ayad Walters MD      metoprolol tartrate  25 mg Oral Q12H Jordyn Jenkins MD      nystatin   Topical BID Ayad Walters MD      oxymetazoline  2 spray Left Nare Q12H [address] Ayad Walters MD      pravastatin  80 mg Oral Daily With Mari Portillo MD      torsemide  20 mg Oral Daily Ayad Walters MD          Today, Patient Was Seen By: Ayad Walters MD    ** Please Note: "This note has been constructed using a voice recognition system  Therefore there may be syntax, spelling, and/or grammatical errors   Please call if you have any questions  "**

## 2022-05-19 NOTE — CONSULTS
Consultation - Cardiology   Mata Sanchez 73 y o  female MRN: [id number]  Unit/Bed#: [address] [place] [address] Encounter: [id number]    Assessment/Plan     Assessment:  1  Acute on chronic diastolic heart failure  2  Paroxysmal atrial fibrillation  3  Hypertension  4  Anemia  5  Status post left shoulder replacement on 03/01/2022      Plan:  Patient has been admitted to the hospitalist service  1  She initially was transition back to p o  Demadex but still appears to be volume overloaded with facial and upper extremity edema  Will switch her back to Lasix 40 mg IV b i d  With close monitoring of I&O, daily weights and renal function  2  Will continue telemetry for atrial fibrillation surveillance    3  Continue Lopressor 25 mg q 12 hours and titrate as needed    4  Continue Eliquis 5 mg b i d  History of Present Illness   Physician Requesting Consult: Jaxon Nieves MD  Reason for Consult / Principal Problem:  Volume overload      HPI: Mata Sanchez is a 73y o  year old female who presented to the emergency room on 05/16/2022 with complaints of shortness of breath, lethargy and general fatigue  She also noted at time she would have some pressure in her chest, PND and orthopnea  On 2 L looking low she was noted to have an O2 sat in the mid 80s and was placed on BiPAP in sent to the ICU  Patient was transferred to the general medical-surgical floor yesterday  Chest x-ray on admission demonstrated increased bilateral pleural effusions and vascular congestion  Patient has a history of atrial fibrillation, SVT, diabetes, chronic kidney disease, hypertension, dyslipidemia, asthma/COPD  Inpatient consult to Cardiology  Consult performed by: RK Murillo  Consult ordered by: Jaxon Nieves MD          Review of Systems   Constitutional: Positive for activity change and fatigue  Negative for appetite change and fever  HENT: Negative    Negative for congestion, ear discharge, postnasal drip, sore throat and voice change  Eyes: Negative  Negative for photophobia and visual disturbance  Respiratory: Positive for cough and shortness of breath  Cardiovascular: Positive for leg swelling  Negative for chest pain and palpitations  Gastrointestinal: Negative  Negative for abdominal distention, diarrhea, nausea and vomiting  Endocrine: Negative  Negative for polydipsia, polyphagia and polyuria  Genitourinary: Negative  Negative for difficulty urinating  Musculoskeletal: Negative  Negative for arthralgias  Skin: Negative  Neurological: Negative  Negative for dizziness, syncope, speech difficulty, weakness and light-headedness  Hematological: Negative  Psychiatric/Behavioral: Negative          Historical Information   Past Medical History:   Diagnosis Date    Anemia     Asthma     COVID-19     January 2022    GERD (gastroesophageal reflux disease)     Hyperlipidemia     Kidney stones     PONV (postoperative nausea and vomiting)     SVT (supraventricular tachycardia) (HCC)      Past Surgical History:   Procedure Laterality Date    APPENDECTOMY      CYSTOSCOPY W/ LASER LITHOTRIPSY Left 7/12/2016    Procedure: CYSTOSCOPY URETEROSCOPY WITH LITHOTRIPSY HOLMIUM LASER, RETROGRADE PYELOGRAM AND INSERTION STENT URETERAL;  Surgeon: Colletta Feathers, MD;  Location: [address] [place] [address];  Service:     DILATION AND CURETTAGE OF UTERUS      IR THORACENTESIS  3/18/2022    JOINT REPLACEMENT      right knee    KNEE ARTHROPLASTY Right     TX CYSTOURETHROSCOPY,URETER CATHETER Left 6/29/2016    Procedure: CYSTOSCOPY RETROGRADE PYELOGRAM WITH INSERTION STENT URETERAL, left;  Surgeon: Colletta Feathers, MD;  Location: [address] [place] [address];  Service: Urology    TX RECONSTR TOTAL SHOULDER IMPLANT Left 3/1/2022    Procedure: ARTHROPLASTY SHOULDER REVERSE;  Surgeon: Boo Crawford MD;  Location: [facility];  Service: Orthopedics    SHOULDER ARTHROTOMY Left      Social History     Substance and Sexual Activity Alcohol Use Not Currently    Comment: rarely     Social History     Substance and Sexual Activity   Drug Use No     E-Cigarette/Vaping    E-Cigarette Use Never User      E-Cigarette/Vaping Substances     Social History     Tobacco Use   Smoking Status Former Smoker    Packs/day: 1 00    Years: 20     Pack years:     Types: Cigarettes    Quit date: 1996    Years since quittin 8   Smokeless Tobacco Never Used     Family History:   Family History   Problem Relation Age of Onset    Heart disease Mother     Emphysema Maternal Grandmother     Heart disease Family        Meds/Allergies   all current active meds have been reviewed, current meds:   Current Facility-Administered Medications   Medication Dose Route Frequency    acetaminophen (TYLENOL) tablet 650 mg  650 mg Oral Q6H PRN    albuterol (PROVENTIL HFA,VENTOLIN HFA) inhaler 2 puff  2 puff Inhalation Q6H PRN    apixaban (ELIQUIS) tablet 5 mg  5 mg Oral BID    docusate sodium (COLACE) capsule 100 mg  100 mg Oral Daily    furosemide (LASIX) injection 40 mg  40 mg Intravenous BID (diuretic)    insulin glargine (LANTUS) subcutaneous injection 25 Units 0 25 mL  25 Units Subcutaneous HS    insulin lispro (HumaLOG) 100 units/mL subcutaneous injection 2-12 Units  2-12 Units Subcutaneous 4x Daily (AC & HS)    ipratropium (ATROVENT) 0 02 % inhalation solution 0 5 mg  0 5 mg Nebulization TID    levalbuterol (XOPENEX) inhalation solution 1 25 mg  1 25 mg Nebulization TID    metoprolol tartrate (LOPRESSOR) tablet 25 mg  25 mg Oral Q12H SELMA    nystatin (MYCOSTATIN) powder   Topical BID    oxymetazoline (AFRIN) 0 05 % nasal spray 2 spray  2 spray Left Nare Q12H [address]    pravastatin (PRAVACHOL) tablet 80 mg  80 mg Oral Daily With Dinner    and PTA meds:   Prior to Admission Medications   Prescriptions Last Dose Informant Patient Reported? Taking?    Insulin Pen Needle (NovoFine Autocover) 30G X 8 MM MISC Unknown at Unknown time Self No No   Sig: Inject under the skin daily   Lancets (onetouch ultrasoft) lancets Unknown at Unknown time Self No No   Sig: Use once daily   Toujeo SoloStar 300 units/mL CONCENTRATED U-300 injection pen (1-unit dial) Unknown at Unknown time Self No No   Sig: Inject 35 Units under the skin daily at bedtime   Trulicity 1 5 MG/2 4AJ SOPN Unknown at Unknown time Self Yes No   Sig: inject 0 5 milliliter subcutaneously every week 28   acetaminophen (TYLENOL) 325 mg tablet Unknown at Unknown time Self Yes No   Sig: Take 650 mg by mouth every 6 (six) hours as needed for mild pain     albuterol (PROVENTIL HFA,VENTOLIN HFA) 90 mcg/act inhaler Unknown at Unknown time Self No No   Sig: Inhale 2 puffs every 6 (six) hours as needed for wheezing   ammonium lactate (LAC-HYDRIN) 12 % lotion Unknown at Unknown time Self Yes No   Sig: APPLY TOPICALLY TO AFFECTED AREAS twice a day   apixaban (ELIQUIS) 5 mg Unknown at Unknown time  No No   Sig: Take 1 tablet (5 mg total) by mouth 2 (two) times a day   b complex-vitamin C-folic acid (NEPHROCAPS) 1 mg capsule Unknown at Unknown time  No No   Sig: Take 1 capsule by mouth daily   docusate sodium (COLACE) 100 mg capsule Unknown at Unknown time Self Yes No   Sig: Take 100 mg by mouth in the morning   glucose blood (OneTouch Ultra) test strip Unknown at Unknown time Self No No   Sig: Test twice daily     lidocaine (LIDODERM) 5 %  Self No No   Sig: Apply 2 patches topically daily Remove & Discard patch within 12 hours or as directed by MD   metoprolol tartrate (LOPRESSOR) 25 mg tablet Unknown at Unknown time  No No   Sig: Take 1 tablet (25 mg total) by mouth every 12 (twelve) hours   montelukast (SINGULAIR) 10 mg tablet Unknown at Unknown time Self Yes No   Sig: Take 10 mg by mouth daily     nystatin (MYCOSTATIN) powder Unknown at Unknown time Self Yes No   Sig: Apply topically 2 (two) times a day   pregabalin (LYRICA) 100 mg capsule Unknown at Unknown time Self Yes No   Sig: Take 100 mg by mouth 2 (two) times a day    rosuvastatin (CRESTOR) 10 MG tablet Unknown at Unknown time Self Yes No   Sig: 10 mg daily     sulfamethoxazole-trimethoprim (BACTRIM DS) 800-160 mg per tablet Unknown at Unknown time  No No   Sig: Take 1 tablet by mouth in the morning and 1 tablet in the evening  Do all this for 7 days  smx-tmp DS (BACTRIM) 800-160 mg tabs (1tab q12 D10)  torsemide (DEMADEX) 10 mg tablet  Self No No   Sig: Take 1 tablet (10 mg total) by mouth daily      Facility-Administered Medications: None     Allergies   Allergen Reactions    Penicillins Hives    Moxifloxacin Other (See Comments)     unknown    Percocet [Oxycodone-Acetaminophen] Other (See Comments)     unknown    Zinc Acetate Other (See Comments)     unknown    Asa [Aspirin] GI Intolerance    Indocin [Indomethacin] Other (See Comments)     Made patient "loopy"    Other Other (See Comments)     unknown       Objective   Vitals: Blood pressure 162/64, pulse 58, temperature 98 2 °F (36 8 °C), resp  rate 18, height 5' 2" (1 575 m), weight 117 kg (257 lb 4 4 oz), SpO2 99 %, not currently breastfeeding  Orthostatic Blood Pressures    Flowsheet Row Most Recent Value   Blood Pressure 162/64 filed at 05/19/2022 1247   Patient Position - Orthostatic VS Lying filed at 05/19/2022 0721            Intake/Output Summary (Last 24 hours) at 5/19/2022 1430  Last data filed at 5/19/2022 1124  Gross per 24 hour   Intake 237 ml   Output --   Net 237 ml       Invasive Devices  Report    Peripheral Intravenous Line  Duration           Peripheral IV 05/18/22 Left; Lower Arm 1 day                Physical Exam  Vitals and nursing note reviewed  Constitutional:       Appearance: Normal appearance  She is morbidly obese  HENT:      Right Ear: External ear normal       Left Ear: External ear normal       Mouth/Throat:      Pharynx: No oropharyngeal exudate or posterior oropharyngeal erythema  Eyes:      General: No scleral icterus  Right eye: No discharge           Left eye: No discharge  Conjunctiva/sclera: Conjunctivae normal    Cardiovascular:      Rate and Rhythm: Normal rate and regular rhythm  Pulses: Normal pulses  Heart sounds: Murmur heard  Pulmonary:      Effort: Pulmonary effort is normal  No accessory muscle usage or respiratory distress  Breath sounds: Examination of the right-lower field reveals decreased breath sounds  Examination of the left-lower field reveals decreased breath sounds  Decreased breath sounds present  Abdominal:      General: Bowel sounds are normal  There is no distension  Palpations: Abdomen is soft  Musculoskeletal:      Right lower leg: Edema present  Left lower leg: Edema present  Skin:     General: Skin is warm and dry  Capillary Refill: Capillary refill takes less than 2 seconds  Neurological:      General: No focal deficit present  Mental Status: She is alert and oriented to person, place, and time  Mental status is at baseline  Psychiatric:         Mood and Affect: Mood normal          Behavior: Behavior is cooperative  Lab Results:   I have personally reviewed pertinent lab results  CBC with diff:   Results from last 7 days   Lab Units 05/19/22  0427   WBC Thousand/uL 6 85   RBC Million/uL 3 25*   HEMOGLOBIN g/dL 9 1*   HEMATOCRIT % 30 6*   MCV fL 94   MCH pg 28 0   MCHC g/dL 29 7*   RDW % 14 5   MPV fL 10 8   PLATELETS Thousands/uL 184     CMP:   Results from last 7 days   Lab Units 05/19/22  0427 05/18/22  0523 05/17/22  0548   SODIUM mmol/L 143   < > 144   CHLORIDE mmol/L 103   < > 103   CO2 mmol/L 38*   < > 37*   BUN mg/dL 36*   < > 46*   CREATININE mg/dL 1 49*   < > 1 97*   CALCIUM mg/dL 8 5   < > 8 6   AST U/L  --   --  13   ALT U/L  --   --  15   ALK PHOS U/L  --   --  59   EGFR ml/min/1 73sq m 35   < > 25    < > = values in this interval not displayed       HS Troponin:   0   Lab Value Date/Time    HSTNI0 15 05/16/2022 0901    HSTNI2 23 05/16/2022 1125    HSTNI4 27 05/16/2022 [address] 03/28/2022 1955    HSTNI4 7 03/09/2022 2228     BNP:   Results from last 7 days   Lab Units 05/19/22  0427   POTASSIUM mmol/L 4 5   CHLORIDE mmol/L 103   CO2 mmol/L 38*   BUN mg/dL 36*   CREATININE mg/dL 1 49*   CALCIUM mg/dL 8 5   EGFR ml/min/1 73sq m 35     Coags:   Results from last 7 days   Lab Units 05/13/22  0027   PTT seconds 33   INR  1 18     TSH:     Magnesium:   Results from last 7 days   Lab Units 05/19/22  0427   MAGNESIUM mg/dL 2 1     Lipid Profile:     Imaging: I have personally reviewed pertinent reports      VTE Prophylaxis: Sequential compression device Rajesh Prakash)     Code Status: Level 1 - Full Code  Advance Directive and Living Will:      Power of :    POLST:      Keyla Alonzo, [address]

## 2022-05-19 NOTE — ASSESSMENT & PLAN NOTE
Patient presented with lethargy, reported increasing shortness of breath, chest discomfort  Initial VBG 7 09/127/55/38  Chest x-ray with evidence of vascular congestion, bilateral effusion  Repeat ABG on room air-7 49/47/65/93%  Likely secondary to CHF, obesity hypoventilation contributing  Currently saturating adequately on room air  · Improved with diuresis and BiPAP-does not require supplemental oxygen during daytime  · Continue diuresis, transitioning to torsemide 20 mg daily  · Pulmonary follow-up appreciated regarding need for BiPAP on discharge for obesity hypoventilation appreciated, patient was approved for BiPAP for obesity hypoventilation    Continue BiPAP on discharge management

## 2022-05-19 NOTE — ASSESSMENT & PLAN NOTE
Suspected during recent ER visit, urine culture with growth of mixed contaminants  Patient denies any dysuria  Discontinue ceftriaxone

## 2022-05-19 NOTE — ASSESSMENT & PLAN NOTE
Secondary to hypercapnic respiratory failure  CT head without any acute abnormality  Now improved with BiPAP

## 2022-05-19 NOTE — ASSESSMENT & PLAN NOTE
Baseline creatinine appears to be 1 4-1 6  Presented with creatinine of 2 1-recent Bactrim use  Improved with diuresis, remains close to baseline  · Follow-up BMP, electrolytes  · Monitor intake output  · Monitor with diuresis, may require higher baseline to maintain euvolemia

## 2022-05-19 NOTE — ASSESSMENT & PLAN NOTE
Likely contributed to hypercapnia  Pulmonary following  ABG-7 37/62/92/96 on 3 5 L, repeat ABG on room air 7 49/47/65/93%  Follow-up bedside spirometry, pulse oximetry-reviewed with Pulmonary   Overnight pulse oximetry on room air noted with desaturation  Pulmonary follow-up appreciated, patient qualifies for BiPAP for discharge  Will need BiPAP 12/06/30% on discharge  Case management input noted, BiPAP was arranged on discharge    Follow-up with Pulmonary as outpatient

## 2022-05-19 NOTE — TELEPHONE ENCOUNTER
----- Message from Cristian Gutierrez MD sent at 5/19/2022  2:58 PM EDT -----  Patient had a min HR of 58 bpm, max HR of 150 bpm, and avg HR of 75  bpm  Predominant underlying rhythm was Sinus Rhythm  32  Supraventricular Tachycardia runs occurred, the run with the fastest  interval lasting 11 3 secs with a max rate of 150 bpm, the longest lasting  26 4 secs with an avg rate of 111 bpm  Supraventricular Tachycardia was  detected within +/- 45 seconds of symptomatic patient event(s)  Isolated  SVEs were rare (<1 0%), SVE Couplets were rare (<1 0%), and SVE  Triplets were rare (<1 0%)  Isolated VEs were rare (<1 0%), VE Couplets  were rare (<1 0%), and no VE Triplets were present  Ventricular Bigeminy  and Trigeminy were present  Final interpretation: Average heart rate 75 beats  No major SVT/afib/flutter   Will discuss further on follow-up

## 2022-05-19 NOTE — PLAN OF CARE
Problem: RESPIRATORY - ADULT  Goal: Achieves optimal ventilation and oxygenation  Description: INTERVENTIONS:  - Assess for changes in respiratory status  - Assess for changes in mentation and behavior  - Position to facilitate oxygenation and minimize respiratory effort  - Oxygen administered by appropriate delivery if ordered  - Initiate smoking cessation education as indicated  - Encourage broncho-pulmonary hygiene including cough, deep breathe, Incentive Spirometry  - Assess the need for suctioning and aspirate as needed  - Assess and instruct to report SOB or any respiratory difficulty  - Respiratory Therapy support as indicated  Outcome: Progressing     Problem: MOBILITY - ADULT  Goal: Maintain or return to baseline ADL function  Description: INTERVENTIONS:  -  Assess patient's ability to carry out ADLs; assess patient's baseline for ADL function and identify physical deficits which impact ability to perform ADLs (bathing, care of mouth/teeth, toileting, grooming, dressing, etc )  - Assess/evaluate cause of self-care deficits   - Assess range of motion  - Assess patient's mobility; develop plan if impaired  - Assess patient's need for assistive devices and provide as appropriate  - Encourage maximum independence but intervene and supervise when necessary  - Involve family in performance of ADLs  - Assess for home care needs following discharge   - Consider OT consult to assist with ADL evaluation and planning for discharge  - Provide patient education as appropriate  Outcome: Progressing       Problem: Potential for Falls  Goal: Patient will remain free of falls  Description: INTERVENTIONS:  - Educate patient/family on patient safety including physical limitations  - Instruct patient to call for assistance with activity   - Consult OT/PT to assist with strengthening/mobility   - Keep Call bell within reach  - Keep bed low and locked with side rails adjusted as appropriate  - Keep care items and personal belongings within reach  - Initiate and maintain comfort rounds  - Make Fall Risk Sign visible to staff  - Offer Toileting every 2 Hours, in advance of need  - Initiate/Maintain bed  alarm  - Obtain necessary fall risk management equipment: alarm  - Apply yellow socks and bracelet for high fall risk patients  - Consider moving patient to room near nurses station  Outcome: Progressing     Problem: Prexisting or High Potential for Compromised Skin Integrity  Goal: Skin integrity is maintained or improved  Description: INTERVENTIONS:  - Identify patients at risk for skin breakdown  - Assess and monitor skin integrity  - Assess and monitor nutrition and hydration status  - Monitor labs   - Assess for incontinence   - Turn and reposition patient  - Assist with mobility/ambulation  - Relieve pressure over bony prominences  - Avoid friction and shearing  - Provide appropriate hygiene as needed including keeping skin clean and dry  - Evaluate need for skin moisturizer/barrier cream  - Collaborate with interdisciplinary team   - Patient/family teaching  - Consider wound care consult   Outcome: Progressing     Problem: Nutrition/Hydration-ADULT  Goal: Nutrient/Hydration intake appropriate for improving, restoring or maintaining nutritional needs  Description: Monitor and assess patient's nutrition/hydration status for malnutrition  Collaborate with interdisciplinary team and initiate plan and interventions as ordered  Monitor patient's weight and dietary intake as ordered or per policy  Utilize nutrition screening tool and intervene as necessary  Determine patient's food preferences and provide high-protein, high-caloric foods as appropriate       INTERVENTIONS:  - Monitor oral intake, urinary output, labs, and treatment plans  - Assess nutrition and hydration status and recommend course of action  - Evaluate amount of meals eaten  - Assist patient with eating if necessary   - Allow adequate time for meals  - Recommend/ encourage appropriate diets, oral nutritional supplements, and vitamin/mineral supplements  - Order, calculate, and assess calorie counts as needed  - Recommend, monitor, and adjust tube feedings and TPN/PPN based on assessed needs  - Assess need for intravenous fluids  - Provide specific nutrition/hydration education as appropriate  - Include patient/family/caregiver in decisions related to nutrition  Outcome: Progressing

## 2022-05-19 NOTE — PLAN OF CARE
Problem: RESPIRATORY - ADULT  Goal: Achieves optimal ventilation and oxygenation  Description: INTERVENTIONS:  - Assess for changes in respiratory status  - Assess for changes in mentation and behavior  - Position to facilitate oxygenation and minimize respiratory effort  - Oxygen administered by appropriate delivery if ordered  - Initiate smoking cessation education as indicated  - Encourage broncho-pulmonary hygiene including cough, deep breathe, Incentive Spirometry  - Assess the need for suctioning and aspirate as needed  - Assess and instruct to report SOB or any respiratory difficulty  - Respiratory Therapy support as indicated  Outcome: Progressing     Problem: MOBILITY - ADULT  Goal: Maintain or return to baseline ADL function  Description: INTERVENTIONS:  -  Assess patient's ability to carry out ADLs; assess patient's baseline for ADL function and identify physical deficits which impact ability to perform ADLs (bathing, care of mouth/teeth, toileting, grooming, dressing, etc )  - Assess/evaluate cause of self-care deficits   - Assess range of motion  - Assess patient's mobility; develop plan if impaired  - Assess patient's need for assistive devices and provide as appropriate  - Encourage maximum independence but intervene and supervise when necessary  - Involve family in performance of ADLs  - Assess for home care needs following discharge   - Consider OT consult to assist with ADL evaluation and planning for discharge  - Provide patient education as appropriate  Outcome: Progressing  Goal: Maintains/Returns to pre admission functional level  Description: INTERVENTIONS:  - Perform BMAT or MOVE assessment daily    - Set and communicate daily mobility goal to care team and patient/family/caregiver  - Collaborate with rehabilitation services on mobility goals if consulted  - Perform Range of Motion 3 times a day  - Reposition patient every 2 hours    - Dangle patient 3 times a day  - Stand patient 3 times a day  - Ambulate patient 3 times a day  - Out of bed to chair 3 times a day   - Out of bed for meals 3 times a day  - Out of bed for toileting  - Record patient progress and toleration of activity level   Outcome: Progressing     Problem: Potential for Falls  Goal: Patient will remain free of falls  Description: INTERVENTIONS:  - Educate patient/family on patient safety including physical limitations  - Instruct patient to call for assistance with activity   - Consult OT/PT to assist with strengthening/mobility   - Keep Call bell within reach  - Keep bed low and locked with side rails adjusted as appropriate  - Keep care items and personal belongings within reach  - Initiate and maintain comfort rounds  - Make Fall Risk Sign visible to staff  - Offer Toileting every 2 Hours, in advance of need  - Initiate/Maintain bed alarm  - Obtain necessary fall risk management equipment:   - Apply yellow socks and bracelet for high fall risk patients  - Consider moving patient to room near nurses station  Outcome: Progressing     Problem: Prexisting or High Potential for Compromised Skin Integrity  Goal: Skin integrity is maintained or improved  Description: INTERVENTIONS:  - Identify patients at risk for skin breakdown  - Assess and monitor skin integrity  - Assess and monitor nutrition and hydration status  - Monitor labs   - Assess for incontinence   - Turn and reposition patient  - Assist with mobility/ambulation  - Relieve pressure over bony prominences  - Avoid friction and shearing  - Provide appropriate hygiene as needed including keeping skin clean and dry  - Evaluate need for skin moisturizer/barrier cream  - Collaborate with interdisciplinary team   - Patient/family teaching  - Consider wound care consult   Outcome: Progressing     Problem: Nutrition/Hydration-ADULT  Goal: Nutrient/Hydration intake appropriate for improving, restoring or maintaining nutritional needs  Description: Monitor and assess patient's nutrition/hydration status for malnutrition  Collaborate with interdisciplinary team and initiate plan and interventions as ordered  Monitor patient's weight and dietary intake as ordered or per policy  Utilize nutrition screening tool and intervene as necessary  Determine patient's food preferences and provide high-protein, high-caloric foods as appropriate       INTERVENTIONS:  - Monitor oral intake, urinary output, labs, and treatment plans  - Assess nutrition and hydration status and recommend course of action  - Evaluate amount of meals eaten  - Assist patient with eating if necessary   - Allow adequate time for meals  - Recommend/ encourage appropriate diets, oral nutritional supplements, and vitamin/mineral supplements  - Order, calculate, and assess calorie counts as needed  - Recommend, monitor, and adjust tube feedings and TPN/PPN based on assessed needs  - Assess need for intravenous fluids  - Provide specific nutrition/hydration education as appropriate  - Include patient/family/caregiver in decisions related to nutrition  Outcome: Progressing

## 2022-05-20 LAB
ANION GAP SERPL CALCULATED.3IONS-SCNC: 3 MMOL/L (ref 4–13)
ARTERIAL PATENCY WRIST A: YES
BASE EXCESS BLDA CALC-SCNC: 11.2 MMOL/L
BASOPHILS # BLD AUTO: 0.08 THOUSANDS/ΜL (ref 0–0.1)
BASOPHILS NFR BLD AUTO: 1 % (ref 0–1)
BODY TEMPERATURE: 98.3 DEGREES FEHRENHEIT
BUN SERPL-MCNC: 38 MG/DL (ref 5–25)
CALCIUM SERPL-MCNC: 8.5 MG/DL (ref 8.3–10.1)
CHLORIDE SERPL-SCNC: 101 MMOL/L (ref 100–108)
CO2 SERPL-SCNC: 37 MMOL/L (ref 21–32)
CREAT SERPL-MCNC: 1.58 MG/DL (ref 0.6–1.3)
EOSINOPHIL # BLD AUTO: 0.61 THOUSAND/ΜL (ref 0–0.61)
EOSINOPHIL NFR BLD AUTO: 10 % (ref 0–6)
ERYTHROCYTE [DISTWIDTH] IN BLOOD BY AUTOMATED COUNT: 14.5 % (ref 11.6–15.1)
GFR SERPL CREATININE-BSD FRML MDRD: 33 ML/MIN/1.73SQ M
GLUCOSE SERPL-MCNC: 128 MG/DL (ref 65–140)
GLUCOSE SERPL-MCNC: 134 MG/DL (ref 65–140)
GLUCOSE SERPL-MCNC: 166 MG/DL (ref 65–140)
GLUCOSE SERPL-MCNC: 182 MG/DL (ref 65–140)
GLUCOSE SERPL-MCNC: 183 MG/DL (ref 65–140)
GLUCOSE SERPL-MCNC: 183 MG/DL (ref 65–140)
HCO3 BLDA-SCNC: 35.8 MMOL/L (ref 22–28)
HCT VFR BLD AUTO: 30.2 % (ref 34.8–46.1)
HGB BLD-MCNC: 9 G/DL (ref 11.5–15.4)
IMM GRANULOCYTES # BLD AUTO: 0.02 THOUSAND/UL (ref 0–0.2)
IMM GRANULOCYTES NFR BLD AUTO: 0 % (ref 0–2)
LYMPHOCYTES # BLD AUTO: 1.83 THOUSANDS/ΜL (ref 0.6–4.47)
LYMPHOCYTES NFR BLD AUTO: 30 % (ref 14–44)
MCH RBC QN AUTO: 28 PG (ref 26.8–34.3)
MCHC RBC AUTO-ENTMCNC: 29.8 G/DL (ref 31.4–37.4)
MCV RBC AUTO: 94 FL (ref 82–98)
MONOCYTES # BLD AUTO: 0.69 THOUSAND/ΜL (ref 0.17–1.22)
MONOCYTES NFR BLD AUTO: 11 % (ref 4–12)
NEUTROPHILS # BLD AUTO: 2.91 THOUSANDS/ΜL (ref 1.85–7.62)
NEUTS SEG NFR BLD AUTO: 48 % (ref 43–75)
NON VENT ROOM AIR: YES %
NRBC BLD AUTO-RTO: 0 /100 WBCS
O2 CT BLDA-SCNC: 13.4 ML/DL (ref 16–23)
OXYHGB MFR BLDA: 92.8 % (ref 94–97)
PCO2 BLDA: 47.7 MM HG (ref 36–44)
PCO2 TEMP ADJ BLDA: 47.3 MM HG (ref 36–44)
PH BLD: 7.5 [PH] (ref 7.35–7.45)
PH BLDA: 7.49 [PH] (ref 7.35–7.45)
PLATELET # BLD AUTO: 155 THOUSANDS/UL (ref 149–390)
PMV BLD AUTO: 9.9 FL (ref 8.9–12.7)
PO2 BLD: 64.5 MM HG (ref 75–129)
PO2 BLDA: 65.4 MM HG (ref 75–129)
POTASSIUM SERPL-SCNC: 4.4 MMOL/L (ref 3.5–5.3)
RBC # BLD AUTO: 3.21 MILLION/UL (ref 3.81–5.12)
SODIUM SERPL-SCNC: 141 MMOL/L (ref 136–145)
SPECIMEN SOURCE: ABNORMAL
WBC # BLD AUTO: 6.14 THOUSAND/UL (ref 4.31–10.16)

## 2022-05-20 PROCEDURE — 94660 CPAP INITIATION&MGMT: CPT

## 2022-05-20 PROCEDURE — 97535 SELF CARE MNGMENT TRAINING: CPT

## 2022-05-20 PROCEDURE — 97530 THERAPEUTIC ACTIVITIES: CPT

## 2022-05-20 PROCEDURE — 85025 COMPLETE CBC W/AUTO DIFF WBC: CPT | Performed by: INTERNAL MEDICINE

## 2022-05-20 PROCEDURE — 82948 REAGENT STRIP/BLOOD GLUCOSE: CPT

## 2022-05-20 PROCEDURE — 97110 THERAPEUTIC EXERCISES: CPT

## 2022-05-20 PROCEDURE — 99232 SBSQ HOSP IP/OBS MODERATE 35: CPT | Performed by: INTERNAL MEDICINE

## 2022-05-20 PROCEDURE — 80048 BASIC METABOLIC PNL TOTAL CA: CPT | Performed by: INTERNAL MEDICINE

## 2022-05-20 PROCEDURE — 94760 N-INVAS EAR/PLS OXIMETRY 1: CPT

## 2022-05-20 PROCEDURE — 36600 WITHDRAWAL OF ARTERIAL BLOOD: CPT

## 2022-05-20 PROCEDURE — 94640 AIRWAY INHALATION TREATMENT: CPT

## 2022-05-20 PROCEDURE — 82805 BLOOD GASES W/O2 SATURATION: CPT | Performed by: INTERNAL MEDICINE

## 2022-05-20 RX ORDER — ECHINACEA PURPUREA EXTRACT 125 MG
1 TABLET ORAL
Status: DISCONTINUED | OUTPATIENT
Start: 2022-05-20 | End: 2022-05-23 | Stop reason: HOSPADM

## 2022-05-20 RX ORDER — METOPROLOL TARTRATE 50 MG/1
50 TABLET, FILM COATED ORAL EVERY 12 HOURS SCHEDULED
Status: DISCONTINUED | OUTPATIENT
Start: 2022-05-20 | End: 2022-05-23 | Stop reason: HOSPADM

## 2022-05-20 RX ADMIN — INSULIN LISPRO 2 UNITS: 100 INJECTION, SOLUTION INTRAVENOUS; SUBCUTANEOUS at 21:18

## 2022-05-20 RX ADMIN — LEVALBUTEROL HYDROCHLORIDE 1.25 MG: 1.25 SOLUTION, CONCENTRATE RESPIRATORY (INHALATION) at 07:19

## 2022-05-20 RX ADMIN — APIXABAN 5 MG: 5 TABLET, FILM COATED ORAL at 08:13

## 2022-05-20 RX ADMIN — NYSTATIN: 100000 POWDER TOPICAL at 17:54

## 2022-05-20 RX ADMIN — DOCUSATE SODIUM 100 MG: 100 CAPSULE, LIQUID FILLED ORAL at 08:13

## 2022-05-20 RX ADMIN — INSULIN GLARGINE 25 UNITS: 100 INJECTION, SOLUTION SUBCUTANEOUS at 21:16

## 2022-05-20 RX ADMIN — NYSTATIN: 100000 POWDER TOPICAL at 10:00

## 2022-05-20 RX ADMIN — INSULIN LISPRO 2 UNITS: 100 INJECTION, SOLUTION INTRAVENOUS; SUBCUTANEOUS at 17:37

## 2022-05-20 RX ADMIN — APIXABAN 5 MG: 5 TABLET, FILM COATED ORAL at 17:36

## 2022-05-20 RX ADMIN — FUROSEMIDE 40 MG: 10 INJECTION, SOLUTION INTRAMUSCULAR; INTRAVENOUS at 08:14

## 2022-05-20 RX ADMIN — IPRATROPIUM BROMIDE 0.5 MG: 0.5 SOLUTION RESPIRATORY (INHALATION) at 20:50

## 2022-05-20 RX ADMIN — INSULIN LISPRO 2 UNITS: 100 INJECTION, SOLUTION INTRAVENOUS; SUBCUTANEOUS at 12:46

## 2022-05-20 RX ADMIN — METOPROLOL TARTRATE 50 MG: 50 TABLET, FILM COATED ORAL at 21:16

## 2022-05-20 RX ADMIN — METOPROLOL TARTRATE 50 MG: 50 TABLET, FILM COATED ORAL at 08:13

## 2022-05-20 RX ADMIN — IPRATROPIUM BROMIDE 0.5 MG: 0.5 SOLUTION RESPIRATORY (INHALATION) at 07:19

## 2022-05-20 RX ADMIN — LEVALBUTEROL HYDROCHLORIDE 1.25 MG: 1.25 SOLUTION, CONCENTRATE RESPIRATORY (INHALATION) at 13:16

## 2022-05-20 RX ADMIN — IPRATROPIUM BROMIDE 0.5 MG: 0.5 SOLUTION RESPIRATORY (INHALATION) at 13:15

## 2022-05-20 RX ADMIN — PRAVASTATIN SODIUM 80 MG: 80 TABLET ORAL at 17:36

## 2022-05-20 RX ADMIN — FUROSEMIDE 40 MG: 10 INJECTION, SOLUTION INTRAMUSCULAR; INTRAVENOUS at 17:36

## 2022-05-20 RX ADMIN — LEVALBUTEROL HYDROCHLORIDE 1.25 MG: 1.25 SOLUTION, CONCENTRATE RESPIRATORY (INHALATION) at 20:50

## 2022-05-20 NOTE — PLAN OF CARE
Problem: RESPIRATORY - ADULT  Goal: Achieves optimal ventilation and oxygenation  Description: INTERVENTIONS:  - Assess for changes in respiratory status  - Assess for changes in mentation and behavior  - Position to facilitate oxygenation and minimize respiratory effort  - Oxygen administered by appropriate delivery if ordered  - Initiate smoking cessation education as indicated  - Encourage broncho-pulmonary hygiene including cough, deep breathe, Incentive Spirometry  - Assess the need for suctioning and aspirate as needed  - Assess and instruct to report SOB or any respiratory difficulty  - Respiratory Therapy support as indicated  Outcome: Progressing     Problem: MOBILITY - ADULT  Goal: Maintain or return to baseline ADL function  Description: INTERVENTIONS:  -  Assess patient's ability to carry out ADLs; assess patient's baseline for ADL function and identify physical deficits which impact ability to perform ADLs (bathing, care of mouth/teeth, toileting, grooming, dressing, etc )  - Assess/evaluate cause of self-care deficits   - Assess range of motion  - Assess patient's mobility; develop plan if impaired  - Assess patient's need for assistive devices and provide as appropriate  - Encourage maximum independence but intervene and supervise when necessary  - Involve family in performance of ADLs  - Assess for home care needs following discharge   - Consider OT consult to assist with ADL evaluation and planning for discharge  - Provide patient education as appropriate  Outcome: Progressing  Goal: Maintains/Returns to pre admission functional level  Description: INTERVENTIONS:  - Perform BMAT or MOVE assessment daily    - Set and communicate daily mobility goal to care team and patient/family/caregiver  - Collaborate with rehabilitation services on mobility goals if consulted  - Perform Range of Motion 3 times a day  - Reposition patient every 2 hours    - Dangle patient 3 times a day  - Stand patient 2 times a day  - Ambulate patient 2 times a day  - Out of bed to chair 1 times a day   - Out of bed for meals 1 times a day  - Out of bed for toileting  - Record patient progress and toleration of activity level   Outcome: Progressing     Problem: Potential for Falls  Goal: Patient will remain free of falls  Description: INTERVENTIONS:  - Educate patient/family on patient safety including physical limitations  - Instruct patient to call for assistance with activity   - Consult OT/PT to assist with strengthening/mobility   - Keep Call bell within reach  - Keep bed low and locked with side rails adjusted as appropriate  - Keep care items and personal belongings within reach  - Initiate and maintain comfort rounds  - Make Fall Risk Sign visible to staff  - Offer Toileting every 2 Hours, in advance of need  - Initiate/Maintain bed alarm  - Obtain necessary fall risk management equipment: alarms set  - Apply yellow socks and bracelet for high fall risk patients  - Consider moving patient to room near nurses station  Outcome: Progressing     Problem: Prexisting or High Potential for Compromised Skin Integrity  Goal: Skin integrity is maintained or improved  Description: INTERVENTIONS:  - Identify patients at risk for skin breakdown  - Assess and monitor skin integrity  - Assess and monitor nutrition and hydration status  - Monitor labs   - Assess for incontinence   - Turn and reposition patient  - Assist with mobility/ambulation  - Relieve pressure over bony prominences  - Avoid friction and shearing  - Provide appropriate hygiene as needed including keeping skin clean and dry  - Evaluate need for skin moisturizer/barrier cream  - Collaborate with interdisciplinary team   - Patient/family teaching  - Consider wound care consult   Outcome: Progressing     Problem: Nutrition/Hydration-ADULT  Goal: Nutrient/Hydration intake appropriate for improving, restoring or maintaining nutritional needs  Description: Monitor and assess patient's nutrition/hydration status for malnutrition  Collaborate with interdisciplinary team and initiate plan and interventions as ordered  Monitor patient's weight and dietary intake as ordered or per policy  Utilize nutrition screening tool and intervene as necessary  Determine patient's food preferences and provide high-protein, high-caloric foods as appropriate       INTERVENTIONS:  - Monitor oral intake, urinary output, labs, and treatment plans  - Assess nutrition and hydration status and recommend course of action  - Evaluate amount of meals eaten  - Assist patient with eating if necessary   - Allow adequate time for meals  - Recommend/ encourage appropriate diets, oral nutritional supplements, and vitamin/mineral supplements  - Order, calculate, and assess calorie counts as needed  - Recommend, monitor, and adjust tube feedings and TPN/PPN based on assessed needs  - Assess need for intravenous fluids  - Provide specific nutrition/hydration education as appropriate  - Include patient/family/caregiver in decisions related to nutrition  Outcome: Progressing

## 2022-05-20 NOTE — PROGRESS NOTES
[facility] Internal Medicine Progress Note  Patient: Malen Roads 73 y o  female   MRN: [id number]  PCP: Tanya Davies MD  Unit/Bed#: 2 [place] [address] Encounter: [id number]  Date Of Visit: 05/20/22    Problem List:    Principal Problem:    Acute on chronic respiratory failure with hypoxia and hypercapnia ([facility] )  Active Problems:    Acute on chronic diastolic CHF (congestive heart failure) ([facility] )    Diabetes mellitus, type 2 (HCC)    Acute kidney injury on CKD stage 3    Atrial fibrillation with rapid ventricular response ([facility])    Obesity hypoventilation syndrome ([facility])    Asthma    Mixed hyperlipidemia    Essential hypertension    Status post reverse total replacement of left shoulder    Epistaxis    Lower leg edema    Morbid obesity ([facility] )    Positive blood culture      Assessment & Plan:    Acute on chronic diastolic CHF (congestive heart failure) ([facility])  Assessment & Plan  Wt Readings from Last 3 Encounters:   05/20/22 114 kg (250 lb 14 1 oz)   05/13/22 116 kg (255 lb 8 2 oz)   04/28/22 121 kg (267 lb)     Presented with increasing shortness of breath, palpitation, edema  Chest x-ray with evidence of vascular congestion  Echocardiogram EF 50%  Volume status improving with diuresis  Cardiology input appreciated  · Continue diuresis with IV Lasix b i d   · Monitor intake output, daily weight  · Follow up Cardiology recommendations        * Acute on chronic respiratory failure with hypoxia and hypercapnia ([facility] )  Assessment & Plan  Patient presented with lethargy, reported increasing shortness of breath, chest discomfort  Initial VBG 7 09/127/55/38  Chest x-ray with evidence of vascular congestion, bilateral effusion  Likely secondary to CHF, obesity hypoventilation contributing  · Improved with diuresis and BiPAP  · Continue diuresis  · Supplemental oxygen, taper as tolerated  · Repeat ABG on room air   · Pulmonary follow-up regarding need for BiPAP on discharge for obesity hypoventilation    Obesity hypoventilation syndrome [facility])  Assessment & Plan  Likely contributed to hypercapnia  Pulmonary following  ABG-7 37/62/92/96 on 3 5 L  Follow-up bedside spirometry, pulse oximetry-reviewed with Pulmonary   Will repeat ABG on room air, overnight pulse oximetry on room air  Patient may require BiPAP q h s  On discharge    Atrial fibrillation with rapid ventricular response ([address] )  Assessment & Plan  On presentation noted to be AFib with RVR, likely predisposing CHF decompensation  Recently seen by Cardiology for palpitation and was ordered a outpatient monitoring  Recent ER visit for the same  · Heart rate improved at present  · Continue metoprolol, Eliquis  · Follow up Cardiology recommendations    Acute kidney injury on CKD stage 3  Assessment & Plan  Baseline creatinine appears to be 1 4-1 6  Presented with creatinine of 2 1-recent Bactrim use  Improving with diuresis, slightly higher at 1 58 today but remains at baseline  · Follow-up BMP, electrolytes  · Monitor intake output  · Monitor with diuresis, may require higher baseline to maintain euvolemia    Diabetes mellitus, type 2 [facility])  Assessment & Plan  Lab Results   Component Value Date    HGBA1C 6 8 (H) 03/09/2022       Recent Labs     05/20/22  0720 05/20/22  1106 05/20/22  1609 05/20/22  2113   POCGLU 134 183* 183* 166*       Blood Sugar Average: Last 72 hrs:  (P) 182 [id number]     Continue sliding scale  Continue Lantus 25 units q h s      Epistaxis  Assessment & Plan  Noted spontaneously on 05/19  · Improved with local measures, oxymetazoline  · Humidification  · Monitor    Status post reverse total replacement of left shoulder  Assessment & Plan  PT/OT    Essential hypertension  Assessment & Plan   continue Lopressor    Mixed hyperlipidemia  Assessment & Plan  On statin    Asthma  Assessment & Plan  Continue bronchodilators as needed    UTI (urinary tract infection)-resolved as of 5/19/2022  Assessment & Plan  Suspected during recent ER visit, urine culture with growth of mixed contaminants  Patient denies any dysuria  Discontinue ceftriaxone    Positive blood culture  Assessment & Plan  1/2 blood culture positive for coagulase-negative Staph  Likely contaminant  No further workup    Morbid obesity ([address] )  Assessment & Plan  With BMI of 47    Lower leg edema  Assessment & Plan  Continue diuretics  Monitor intake output, daily weight    Acute encephalopathy-resolved as of 2022  Assessment & Plan  Secondary to hypercapnic respiratory failure  CT head without any acute abnormality  Now improved with BiPAP          VTE Pharmacologic Prophylaxis: VTE Score: 8 High Risk (Score >/= 5) - Pharmacological DVT Prophylaxis Ordered: apixaban (Eliquis)  Sequential Compression Devices Ordered  Patient Centered Rounds: I performed bedside rounds with nursing staff today  Discussions with Specialists or Other Care Team Provider:  Pulmonary, Cardiology    Education and Discussions with Family / Patient: Patient declined call to   Attempted to call niece but unable to reach    Time Spent for Care: 45 minutes  More than 50% of total time spent on counseling and coordination of care as described above  Current Length of Stay: 4 day(s)  Current Patient Status: Inpatient   Certification Statement: The patient will continue to require additional inpatient hospital stay due to Respiratory failure  Discharge Plan: Anticipate discharge in 48-72 hrs to home with home services      Code Status: Level 1 - Full Code    Subjective:   Reports that her breathing is improving  No further epistaxis  Reports good urine output  Does not like BiPAP but reports that she will attempt if she has to      Objective:     Vitals:   Temp (24hrs), Av 1 °F (36 7 °C), Min:97 8 °F (36 6 °C), Max:98 3 °F (36 8 °C)    Temp:  [97 8 °F (36 6 °C)-98 3 °F (36 8 °C)] 98 3 °F (36 8 °C)  HR:  [58-83] 72  Resp:  [18-21] 18  BP: (143-162)/(57-75) 154/57  SpO2:  [97 %-99 %] 97 % on 2 L at rest  Body mass index is 45 89 kg/m²  Input and Output Summary (last 24 hours): Intake/Output Summary (Last 24 hours) at 5/20/2022 1024  Last data filed at 5/20/2022 1015  Gross per 24 hour   Intake 867 ml   Output 1300 ml   Net -433 ml       Physical Exam:   Physical Exam  Constitutional:       General: She is not in acute distress  Appearance: She is obese  HENT:      Head: Normocephalic and atraumatic  Cardiovascular:      Rate and Rhythm: Normal rate  Pulmonary:      Effort: Pulmonary effort is normal  No respiratory distress  Breath sounds: Normal breath sounds  No wheezing or rales  Comments: Diminished  Abdominal:      General: Bowel sounds are normal  There is no distension  Palpations: Abdomen is soft  Tenderness: There is no abdominal tenderness  There is no guarding or rebound  Musculoskeletal:      Right lower leg: Edema present  Left lower leg: Edema present  Comments: Lower extremity edema improving   Skin:     General: Skin is warm and dry  Findings: No rash  Neurological:      Mental Status: She is alert  Additional Data:     Labs:  Results from last 7 days   Lab Units 05/20/22  0544   WBC Thousand/uL 6 14   HEMOGLOBIN g/dL 9 0*   HEMATOCRIT % 30 2*   PLATELETS Thousands/uL 155   NEUTROS PCT % 48   LYMPHS PCT % 30   MONOS PCT % 11   EOS PCT % 10*     Results from last 7 days   Lab Units 05/20/22  0544 05/18/22  0523 05/17/22  0548   SODIUM mmol/L 141   < > 144   POTASSIUM mmol/L 4 4   < > 5 0   CHLORIDE mmol/L 101   < > 103   CO2 mmol/L 37*   < > 37*   BUN mg/dL 38*   < > 46*   CREATININE mg/dL 1 58*   < > 1 97*   ANION GAP mmol/L 3*   < > 4   CALCIUM mg/dL 8 5   < > 8 6   ALBUMIN g/dL  --   --  2 9*   TOTAL BILIRUBIN mg/dL  --   --  0 22   ALK PHOS U/L  --   --  59   ALT U/L  --   --  15   AST U/L  --   --  13   GLUCOSE RANDOM mg/dL 128   < > 123    < > = values in this interval not displayed           Results from last 7 days   Lab Units 05/20/22  0720 05/20/22  0029 05/19/22  2138 05/19/22  1559 05/19/22  1109 05/19/22  0731 05/18/22  2204 05/18/22  1640 05/18/22  1126 05/17/22  2058 05/17/22  1645 05/17/22  1124   POC GLUCOSE mg/dl 134 182* 211* 129 248* 182* 237* 142* 264* 187* 173* 205*         Results from last 7 days   Lab Units 05/19/22  0427 05/18/22  0523 05/17/22  0548 05/16/22  0901   PROCALCITONIN ng/ml 0 51* 0 73* 1 15* 0 07       Lines/Drains:  Invasive Devices  Report    Peripheral Intravenous Line  Duration           Peripheral IV 05/18/22 Left; Lower Arm 2 days                  Telemetry:  Telemetry Orders (From admission, onward)             48 Hour Telemetry Monitoring  Continuous x 48 hours        Expiring   References:    Telemetry Guidelines   Question:  Reason for 48 Hour Telemetry  Answer:  Acute Decompensated CHF (continuous diuretic infusion or total diuretic dose > 200 mg daily, associated electrolyte derangement, ionotropic drip, history of ventricular arrhythmia, or new EF <35%)                          Imaging: Reviewed radiology reports from this admission including: chest xray and CT head    Recent Cultures (last 7 days):   Results from last 7 days   Lab Units 05/16/22  1904 05/16/22  1215   BLOOD CULTURE   --  No Growth at 72 hrs    Staphylococcus coagulase negative*   GRAM STAIN RESULT   --  Gram positive cocci in clusters*   LEGIONELLA URINARY ANTIGEN  Negative  --        Last 24 Hours Medication List:   Current Facility-Administered Medications   Medication Dose Route Frequency Provider Last Rate    acetaminophen  650 mg Oral Q6H PRN Clifton Sawyer MD      albuterol  2 puff Inhalation Q6H PRN Clifton Sawyer MD      apixaban  5 mg Oral BID Clifton Sawyer MD      docusate sodium  100 mg Oral Daily Clifton Sawyer MD      furosemide  40 mg Intravenous BID (diuretic) RK Ghotra      insulin glargine  25 Units Subcutaneous HS Clifton Sawyer MD      insulin lispro  2-12 Units Subcutaneous 4x Daily (AC & HS) Patrick Ahumada MD      ipratropium  0 5 mg Nebulization TID Patrick Ahumada MD      levalbuterol  1 25 mg Nebulization TID Patrick Ahumada MD      metoprolol tartrate  50 mg Oral Q12H [address] Raj Reardon MD      nystatin   Topical BID Patrick Ahumada MD      oxymetazoline  2 spray Left Nare Q12H [address] Patrick Ahumada MD      pravastatin  80 mg Oral Daily With [address] MD          Today, Patient Was Seen By: Patrick Ahumada MD    ** Please Note: "This note has been constructed using a voice recognition system  Therefore there may be syntax, spelling, and/or grammatical errors   Please call if you have any questions  "**

## 2022-05-20 NOTE — PLAN OF CARE
Problem: OCCUPATIONAL THERAPY ADULT  Goal: Performs self-care activities at highest level of function for planned discharge setting  See evaluation for individualized goals  Outcome: Progressing  Note: Limitation: Decreased ADL status, Decreased UE ROM, Decreased UE strength, Decreased Safe judgement during ADL, Decreased endurance, Decreased self-care trans, Decreased high-level ADLs (decreased balance and mobility)  Prognosis: Good  Assessment: Patient seen for OT treatment  Patient completed toileting with supervision from standard toilet  Patient given sponge home program for gripping and pinch and verbalized understanding  Patient is fatigued with activity  Patient will benefit from continued OT services to maximize functional performance with ADLS       OT Discharge Recommendation: Home with home health rehabilitation

## 2022-05-20 NOTE — PROGRESS NOTES
Progress Note - Cardiology   [facility]     Kaylin Patel 73 y o  female MRN: [id number]  : 1950  Unit/Bed#: [address] [place] [address] Encounter: [id number]        ASSESSMENT:   Acute on chronic diastolic heart failure due to volume overload   EF 53%       PAF, Ventricular rate is now controlled at 72 per minute      Hypertension , BP today is 154/57 mmHg      Morbid obesity   KATRINA   Diabetes mellitus   Dyslipidemia   SEBASTIAN on CKD , BUN/CR 38/1 58              RECOMMENDATIONS:   Continue IV Lasix 40 mg b i d  Continue Eliquis, and statin   Metoprolol increased to 50 mg q 12 hours  Treatment for KATRINA if indicated    Monitor daily weight, I/O, and electrolytes/renal function               Subjective / Objective:     Review of Systems   Sitting up in chair  States that she is feeling better  Denies any chest pain or dyspnea at rest  Vitals: Blood pressure 154/57, pulse 72, temperature 98 3 °F (36 8 °C), resp  rate 18, height 5' 2" (1 575 m), weight 114 kg (250 lb 14 1 oz), SpO2 97 %, not currently breastfeeding  Vitals:    22 0926 22 0550   Weight: 115 kg (252 lb 12 8 oz) 114 kg (250 lb 14 1 oz)     Body mass index is 45 89 kg/m²  BP Readings from Last 3 Encounters:   22 154/57   22 146/65   22 126/62     Orthostatic Blood Pressures    Flowsheet Row Most Recent Value   Blood Pressure 154/57 filed at 2022 0803   Patient Position - Orthostatic VS Lying filed at 2022 0721        I/O        0701   0700  0701   0700  0701   0700    P  O  360 537 300    I V  (mL/kg)  20 (0 2) 10 (0 1)    IV Piggyback 50      Total Intake(mL/kg) 410 (3 5) 557 (4 9) 310 (2 7)    Urine (mL/kg/hr) 675 (0 2) 550 (0 2) 450 (1 3)    Stool 0 0     Total Output 675 550 450    Net -265 +7 -140           Unmeasured Urine Occurrence 2 x 3 x     Unmeasured Stool Occurrence 2 x 2 x         Invasive Devices  Report    Peripheral Intravenous Line  Duration Peripheral IV 05/18/22 Left; Lower Arm 2 days                  Intake/Output Summary (Last 24 hours) at 5/20/2022 0959  Last data filed at 5/20/2022 0854  Gross per 24 hour   Intake 867 ml   Output 1000 ml   Net -133 ml         Physical Exam:   Physical Exam    Neurologic:  Alert & oriented x 3,  no focal deficits noted   Constitutional:  Obese  Eyes:  PERRL, conjunctiva normal   HENT:  Atraumatic, external ears normal, nose normal,   NECK: Normal range of motion, no tenderness, neck is supple ,  Respiratory:  Decreased breath sounds in the bases  Cardiovascular: Regular S1-S2 with a I/VI ejection systolic murmur  No pericardial rub or gallop audible  GI:  Soft, nondistended, audible bowel sounds, nontender, no hepatosplenomegaly appreciated  Musculoskeletal:  No tenderness, no deformities  Extremities:  No edema   Distal pulses are present  Psychiatric:  Speech and behavior appropriate             Medications/ Allergies:     Current Facility-Administered Medications   Medication Dose Route Frequency Provider Last Rate    acetaminophen  650 mg Oral Q6H PRN Kojo Rizvi MD      albuterol  2 puff Inhalation Q6H PRN Kojo Rizvi MD      apixaban  5 mg Oral BID Kojo Rizvi MD      docusate sodium  100 mg Oral Daily Kojo Rizvi MD      furosemide  40 mg Intravenous BID (diuretic) [place]      insulin glargine  25 Units Subcutaneous HS Kojo Rizvi MD      insulin lispro  2-12 Units Subcutaneous 4x Daily (AC & HS) Kojo Rizvi MD      ipratropium  0 5 mg Nebulization TID Kojo Rizvi MD      levalbuterol  1 25 mg Nebulization TID Kojo Rizvi MD      metoprolol tartrate  50 mg Oral Q12H [facility] Lisa Wolff MD      nystatin   Topical BID Kojo Rizvi MD      oxymetazoline  2 spray Left Nare Q12H [facility] Kojo Rizvi MD      pravastatin  80 mg Oral Daily With Nicolle Sibley MD       acetaminophen, 650 mg, Q6H PRN  albuterol, 2 puff, Q6H PRN      Allergies   Allergen Reactions    Penicillins Hives    Moxifloxacin Other (See Comments)     unknown    Percocet [Oxycodone-Acetaminophen] Other (See Comments)     unknown    Zinc Acetate Other (See Comments)     unknown    Asa [Aspirin] GI Intolerance    Indocin [Indomethacin] Other (See Comments)     Made patient "loopy"    Other Other (See Comments)     unknown           Labs:   Troponins:      CBC with diff:  Results from last 7 days   Lab Units 05/20/22  0544 05/19/22  0427 05/18/22  0523 05/17/22  0548 05/16/22  0901   WBC Thousand/uL 6 14 6 85 6 77 8 26 16 86*   HEMOGLOBIN g/dL 9 0* 9 1* 8 9* 8 8* 10 5*   HEMATOCRIT % 30 2* 30 6* 30 5* 29 4* 37 2   MCV fL 94 94 95 94 99*   PLATELETS Thousands/uL 155 184 169 181 243   MCH pg 28 0 28 0 27 7 28 2 27 9   MCHC g/dL 29 8* 29 7* 29 2* 29 9* 28 2*   RDW % 14 5 14 5 14 6 14 4 14 4   MPV fL 9 9 10 8 10 4 10 9 10 9   NRBC AUTO /100 WBCs 0 0 0 0 0       CMP:  Results from last 7 days   Lab Units 05/20/22  0544 05/19/22  0427 05/18/22  0523 05/17/22  0548 05/16/22  0901   SODIUM mmol/L 141 143 142 144 143   POTASSIUM mmol/L 4 4 4 5 4 9 5 0 5 2   CHLORIDE mmol/L 101 103 102 103 102   CO2 mmol/L 37* 38* 37* 37* 37*   ANION GAP mmol/L 3* 2* 3* 4 4   BUN mg/dL 38* 36* 42* 46* 41*   CREATININE mg/dL 1 58* 1 49* 1 74* 1 97* 2 13*   CALCIUM mg/dL 8 5 8 5 8 8 8 6 8 6   AST U/L  --   --   --  13 19   ALT U/L  --   --   --  15 18   ALK PHOS U/L  --   --   --  59 84   TOTAL PROTEIN g/dL  --   --   --  7 0 8 3*   ALBUMIN g/dL  --   --   --  2 9* 3 3*   TOTAL BILIRUBIN mg/dL  --   --   --  0 22 0 15*   EGFR ml/min/1 73sq m 33 35 29 25 23       Magnesium:  Results from last 7 days   Lab Units 05/19/22  0427 05/18/22  0523 05/17/22  0548   MAGNESIUM mg/dL 2 1 2 4 2 1     Coags:    TSH:    No components found for: TSH3  Lipid Profile:    Hgb A1c:    NT-proBNP: No results for input(s): NTBNP in the last 72 hours       Imaging & Testing   I have personally reviewed pertinent reports  XR chest 1 view portable    Result Date: 5/16/2022  Narrative: CHEST INDICATION:   shortness of breath, respiratory distress  COMPARISON:  5/13/2022 EXAM PERFORMED/VIEWS:  XR CHEST PORTABLE FINDINGS: Cardiomediastinal silhouette appears mildly enlarged with mild increased vascular congestion and increased bilateral pleural effusions  No pneumothorax  Left shoulder arthroplasty again noted  Impression: Mild cardiomegaly Mild increased vascular congestion Increased bilateral pleural effusions Workstation performed: [id number]     XR chest 1 view portable    Result Date: 5/13/2022  Narrative: CHEST INDICATION:   SOB  COMPARISON:  Chest radiograph April 1, 2022 EXAM PERFORMED/VIEWS:  XR CHEST PORTABLE FINDINGS: Cardiomediastinal silhouette appears unremarkable  New mild prominence of the interstitial markings  Small bilateral pleural effusions  No pneumothorax Left shoulder arthroplasty  Impression: Mild pulmonary vascular congestion with small bilateral pleural effusions  Workstation performed: [id number]     CT head without contrast    Result Date: 5/16/2022  Narrative: CT BRAIN - WITHOUT CONTRAST INDICATION:   Mental status change, unknown cause altered mental status  COMPARISON:  2/17/2022 TECHNIQUE:  CT examination of the brain was performed  In addition to axial images, sagittal and coronal 2D reformatted images were created and submitted for interpretation  Radiation dose length product (DLP) for this visit:  912 34 mGy-cm   This examination, like all CT scans performed in the [facility], was performed utilizing techniques to minimize radiation dose exposure, including the use of iterative  reconstruction and automated exposure control  IMAGE QUALITY:  Diagnostic  FINDINGS: PARENCHYMA: Decreased attenuation is noted in periventricular and subcortical white matter demonstrating an appearance that is statistically most likely to represent mild microangiopathic change   No CT signs of acute infarction  No intracranial mass, mass effect or midline shift  No acute parenchymal hemorrhage  VENTRICLES AND EXTRA-AXIAL SPACES:  Normal for the patient's age  VISUALIZED ORBITS AND PARANASAL SINUSES:  There is a left mastoid effusion, not present previously  There is mild left sphenoid sinus and ethmoid air cell mucosal thickening  CALVARIUM AND EXTRACRANIAL SOFT TISSUES:  Normal      Impression: 1  No acute intracranial abnormality  2   New left mastoid effusion  Correlation for acute mastoiditis recommended  Workstation performed: [id number]          Cardiac testing:   Results for orders placed during the hospital encounter of 19    Echo complete with contrast if indicated    Narrative  [address]  [address] [facility] [address]  [phone]    Transthoracic Echocardiogram  2D, M-mode, Doppler, and Color Doppler    Study date:  29-May-2019    Patient: Chito Barboza  MR number: [id number]  Account number: [de-identified]  : 1950  Age: 79 years  Gender: Female  Status: Inpatient  Location: Bedside  Height: 62 in  Weight: 250 6 lb  BP: 133/ 62 mmHg    Indications: Tachycardia    Diagnoses: I11 9 - Hypertensive heart disease without heart failure    Sonographer:  QUETA Richey  Referring Physician:  Kat Gonzales MD  Group:  [facility]  Interpreting Physician:  Carri Burroughs DO    SUMMARY    LEFT VENTRICLE:  Systolic function was normal by visual assessment  Ejection fraction was estimated to be 55 %  There were no regional wall motion abnormalities  Wall thickness was mildly to moderately increased  Doppler parameters were consistent with abnormal left ventricular relaxation (grade 1 diastolic dysfunction)  MITRAL VALVE:  There was mild regurgitation  AORTIC VALVE:  There was no evidence for stenosis  There was no regurgitation  TRICUSPID VALVE:  There was mild regurgitation    Pulmonary artery systolic pressure was within the normal range  HISTORY: PRIOR HISTORY: Diabetes,Diabetes, HTN, Hyperlipidemia, A Fib  PROCEDURE: The procedure was performed at the bedside  This was a routine study  The transthoracic approach was used  The study included complete 2D imaging, M-mode, complete spectral Doppler, and color Doppler  The heart rate was 77 bpm,  at the start of the study  Images were obtained from the parasternal, apical, subcostal, and suprasternal notch acoustic windows  Echocardiographic views were limited due to restricted patient mobility, poor patient compliance, poor  acoustic window availability, decreased penetration, and lung interference  This was a technically difficult study  LEFT VENTRICLE: Size was normal  Systolic function was normal by visual assessment  Ejection fraction was estimated to be 55 %  There were no regional wall motion abnormalities  Wall thickness was mildly to moderately increased  DOPPLER:  Doppler parameters were consistent with abnormal left ventricular relaxation (grade 1 diastolic dysfunction)  RIGHT VENTRICLE: The size was normal  Systolic function was normal     LEFT ATRIUM: The atrium was mildly dilated  RIGHT ATRIUM: Size was normal     MITRAL VALVE: Valve structure was normal  There was normal leaflet separation  No echocardiographic evidence for prolapse  DOPPLER: The transmitral velocity was within the normal range  There was no evidence for stenosis  There was mild  regurgitation  AORTIC VALVE: The valve was trileaflet  Leaflets exhibited normal thickness, normal cuspal separation, and sclerosis  DOPPLER: Transaortic velocity was within the normal range  There was no evidence for stenosis  There was no  regurgitation  TRICUSPID VALVE: The valve structure was normal  There was normal leaflet separation  DOPPLER: The transtricuspid velocity was within the normal range  There was mild regurgitation   Pulmonary artery systolic pressure was within the normal  range  Estimated peak PA pressure was 32 mmHg  PULMONIC VALVE: Leaflets exhibited normal thickness, no calcification, and normal cuspal separation  DOPPLER: The transpulmonic velocity was within the normal range  There was no regurgitation  PERICARDIUM: There was no thickening  There was no pericardial effusion  AORTA: The root exhibited normal size  PULMONARY ARTERY: The size was normal  The morphology appeared normal     SYSTEM MEASUREMENT TABLES    2D mode  AoR Diam 2D: 3 6 cm  LA Diam (2D): 3 9 cm  LA/Ao (2D): 1 08  FS (2D Teich): 26 9 %  IVSd (2D): 1 29 cm  LVDEV: 75 1 cmï¾³  LVESV: 35 3 cmï¾³  LVIDd(2D): 4 12 cm  LVISd (2D): 3 01 cm  LVOT Area 2D: 3 14 cmï¾²  LVPWd (2D): 1 2 cm  SV (Teich): 39 8 cmï¾³    Apical four chamber  LVEF A4C: 51 %    Unspecified Scan Mode  HANNA Cont Eq (Peak Gutierrez): 2 58 cmï¾²  LVOT Diam : 2 cm  LVOT Vmax: 963 mm/s  LVOT Vmax; Mean: 963 mm/s  Peak Grad ; Mean: 4 mm[Hg]  MV Peak A Gutierrez: 893 mm/s  MV Peak E Gutierrez  Mean: 805 mm/s  MVA (PHT): 3 67 cmï¾²  PHT: 60 ms  Max P mm[Hg]  V Max: 2360 mm/s  Vmax: 2430 mm/s  RA Area: 12 8 cmï¾²  RA Volume: 27 6 cmï¾³  TAPSE: 2 cm    [employer] Accredited Echocardiography Laboratory    Prepared and electronically signed by    Taina Clement DO  Signed 29-May-2019 16:19:07            Dr Yara Anderson MD, [facility]      "This note has been constructed using a voice recognition system  Therefore there may be syntax, spelling, and/or grammatical errors   Please call if you have any questions  "

## 2022-05-20 NOTE — CASE MANAGEMENT
Case Management Discharge Planning Note    Patient name Grayson Celestin  [facility] [address] [facility] MRN [id number]  : 1950 Date 2022       Current Admission Date: 2022  Current Admission Diagnosis:Acute on chronic respiratory failure with hypoxia and hypercapnia [facility])   Patient Active Problem List    Diagnosis Date Noted    Epistaxis 2022    Positive blood culture 2022    Obesity hypoventilation syndrome ([address] ) 2022    Acute on chronic respiratory failure with hypoxia and hypercapnia ([facility] [address] ) 2022    Morbid obesity ([address] ) 2022    Pure hypercholesterolemia 2022    Lump of left breast 2022    Pressure injury of deep tissue of sacral region 2022    Pulmonary nodules 2022    Acute on chronic diastolic CHF (congestive heart failure) ([facility] [address] ) 2022    Atrial fibrillation with rapid ventricular response ([address] ) 2022    Peripheral venous insufficiency 2022    Post-herpetic polyneuropathy 2022    Raynaud's disease 2022    Lung nodule < 6cm on CT 2022    Acute kidney injury on CKD stage 3     Chronic diastolic congestive heart failure ([address] ) 03/10/2022    Status post reverse total replacement of left shoulder 2022    Closed 4-part fracture of proximal humerus, left, initial encounter 2022    PONV (postoperative nausea and vomiting) 2022    SEBASTIAN (acute kidney injury) ([address] ) 2022    Diabetes mellitus, type 2 ([address] ) 2022    Gastroesophageal reflux disease 2022    Nephrolithiasis 2022    Arthritis 2022    S/P total knee replacement, right 2022    On continuous oral anticoagulation - eliquis 2022    Humeral head fracture 2022    Essential hypertension 2019    Anemia 2019    Mixed hyperlipidemia 2019    Paroxysmal atrial fibrillation (HCC) 2019    Lower leg edema 2019    Asthma 2018    Morbid obesity with BMI of 45 0-49 9, adult ([facility] [address] ) 03/08/2018      LOS (days): 4  Geometric Mean LOS (GMLOS) (days): 3 80  Days to GMLOS:-0 1     OBJECTIVE:  Risk of Unplanned Readmission Score: 35 26         Current admission status: Inpatient   Preferred Pharmacy:   [address] ([address], [address] [facility] ([address] [place] [address])  [address]  [address] ([address])  [place] [address]  Phone: [phone] Fax: [phone]    Primary Care Provider: Jarad Austin MD    Primary Insurance: MEDICARE  Secondary Insurance: [employer]    DISCHARGE DETAILS:    Discharge planning discussed with[de-identified] patient and niece  Freedom of Choice: Yes  Comments - Freedom of Choice: Family and pt would like to resume [address] with [facility]    they do not want STR  CM contacted family/caregiver?: Yes  Were Treatment Team discharge recommendations reviewed with patient/caregiver?: Yes  Did patient/caregiver verbalize understanding of patient care needs?: Yes  Were patient/caregiver advised of the risks associated with not following Treatment Team discharge recommendations?: Yes    Contacts  Patient Contacts: Finn Swanson  Relationship to Patient[de-identified] Family  Contact Method: Phone  Phone Number: [phone]  Reason/Outcome: Discharge Planning, Emergency Contact, Continuity of [facility]         Is the patient interested in [address] at discharge?: Yes  [address] requested[de-identified] Physical Therapy, Occupational Therapy, [address] Name[de-identified] Hoang Burks Provider[de-identified] PCP  Home Health Services Needed[de-identified] Gait/ADL Training, Evaluate Functional Status and Safety, Diabetes Management, Oxygen Via Nasal Cannula  Homebound Criteria Met[de-identified] Requires the Assistance of Another Person for Safe Ambulation or to Leave the Home  Supporting Clincal Findings[de-identified] Limited Endurance    DME Referral Provided  Referral made for DME?: No (family states they have all dme needed at home)    Other Referral/Resources/Interventions Provided:  Interventions: [facility]    Would you like to participate in our [address] service program?  : No - Declined    Treatment Team Recommendation: Home with [employer]  Discharge Destination Plan[de-identified] Home with [employer]   IMM Given (Date):: 05/20/22 (reviewed with nicole, agreeable to plan, imm placed in scan bin)  IMM Given to[de-identified] Patient  Family notified[de-identified] domo rivera Cm spoke with pt and niece, plan is still to go home with [facility]  Pt is being evaluated for bipap at home  Likely here over the weekend for diuresis  Family will be able to transport her home at time of dc

## 2022-05-20 NOTE — PHYSICAL THERAPY NOTE
PT TREATMENT     05/20/22 1510   Note Type   Note Type Treatment   Pain Assessment   Pain Assessment Tool 0-10   Pain Score No Pain   Restrictions/Precautions   Weight Bearing Precautions Per Order Yes   LUE Weight Bearing Per Order  PWB  (per pt 20% wb)   Other Precautions   (no L shoulder extension, no combined L shoulder IR/add/ext), 02, falls   General   Chart Reviewed Yes   Cognition   Overall Cognitive Status WFL   Arousal/Participation Alert   Subjective   Subjective "feeling OK, I was just trying to take a nap"   Bed Mobility   Supine to Sit going to R side 6  Modified independent   Additional items Bedrails   Sit to Supine 6  Modified independent   Additional items Bedrails   Transfers   Sit to Stand 5  Supervision   Stand to Sit 5  Supervision   Stand pivot 5  Supervision   Toilet transfer 5  Supervision   Additional items Commode;  dependent to wipe   Ambulation/Elevation   Gait Assistance 5  Supervision   Assistive Device   (o2)   Distance 4x30 feet with seated rest breaks   Balance   Static Sitting Fair +   Dynamic Sitting Fair   Static Standing Fair +   Dynamic Standing Fair   Ambulatory Fair   Activity Tolerance   Activity Tolerance Patient tolerated treatment well   Exercises   Neuro re-ed x 10 each AAROM scaption, IR/ER, elbow flexion in availble range   Assessment   Prognosis Good   Problem List Decreased strength;Decreased range of motion; Impaired balance;Decreased mobility   Assessment Pt demonstrates improving endurance and function  Pt was able to ambulate 4x30 feet in the room with supervision/02  Encouraged pt to ask the nursing staff to ambulate with her to the bathroom  Also encouraged pt to start moving her L arm more as she is 10 weeks s/p reverse TSA with little function of her L arm  Spoke with OT staff regarding pt's L arm  The patient's AM-PAC Basic Mobility Inpatient Short Form Raw Score is 19   A Raw score of greater than 16 suggests the patient may benefit from discharge to home     Plan   Treatment/Interventions Functional transfer training;LE strengthening/ROM; Elevations; Therapeutic exercise;Gait training;Bed mobility; Equipment eval/education; Endurance training   Progress Progressing toward goals   PT Frequency Other (Comment)  (5w)   Recommendation   PT Discharge Recommendation Home with outpatient rehabilitation  (for pt's L reverse total shoulder)   AM-PAC Basic Mobility Inpatient   Turning in Bed Without Bedrails 3   Lying on Back to Sitting on Edge of Flat Bed 3   Moving Bed to Chair 3   Standing Up From Chair 4   Walk in Room 3   Climb 3-5 Stairs 3   Basic Mobility Inpatient Raw Score 19   Basic Mobility Standardized Score 42 48   Highest Level Of Mobility   JH-HLM Goal 6: Walk 10 steps or more   JH-HLM Achieved 7: Walk 25 feet or more   End of Consult   Patient Position at End of Consult All needs within reach; Supine   Licensure   [place] License Number  Olga Jose David PT [id number]

## 2022-05-20 NOTE — ASSESSMENT & PLAN NOTE
Noted spontaneously on 05/19  · Improved with local measures, oxymetazoline  · Humidification  · Monitor

## 2022-05-20 NOTE — OCCUPATIONAL THERAPY NOTE
OT TREATMENT       05/20/22 1600   Note Type   Note Type Treatment   Restrictions/Precautions   LUE Weight Bearing Per Order PWB  (20% WB)   Other Precautions Fall Risk; Chair Alarm; Bed Alarm  (no L shoulder extension, no combined L shoulder IR/add/ext)   Pain Assessment   Pain Assessment Tool 0-10   Pain Score No Pain   ADL   Grooming Assistance 5  Supervision/Setup   Grooming Deficit Wash/dry hands;Brushing hair   Grooming Comments standing at sink   Toileting Assistance  5  Supervision/Setup   Toileting Deficit Perineal hygiene  (standard toilet)   Toileting Comments loose BM, hygiene in stance with supervision  setup   Bed Mobility   Supine to Sit 4  Minimal assistance   Sit to Supine 5  Supervision   Transfers   Sit to Stand 5  Supervision   Stand to Sit 5  Supervision   Toilet transfer 5  Supervision   Additional items Standard toilet   Functional Mobility   Functional Mobility   (supervision/min assist)   Additional Comments functional household distance to bathroom and back   ROM - Left Upper Extremities    L Elbow AROM;Elbow flexion;Elbow extension   L Hand AROM; Thumb; Index finger; Long finger;Ring finger;Little finger  (unable to fully flex fingers)   L Weight/Reps/Sets 10 times each seated on edge of bed   LUE ROM Comment patient given sponge for gripping and pinching  Patient encouraged to complete several times per day to increase strength and ROM of L hand as pt is L handed and would like to feed herself with L hand   Cognition   Overall Cognitive Status WFL   Arousal/Participation Cooperative   Attention Within functional limits   Orientation Level Oriented X4   Following Commands Follows all commands and directions without difficulty   Assessment   Assessment Patient seen for OT treatment  Patient completed toileting with supervision from standard toilet  Patient given sponge home program for gripping and pinch and verbalized understanding  Patient is fatigued with activity    Patient will benefit from continued OT services to maximize functional performance with ADLS  The patient's raw score on the AM-PAC Daily Activity inpatient short form is 17, standardized score is 37 26, less than 39 4  Patients at this level are likely to benefit from DC to post-acute rehabilitation services, however pt has a very supportive family and has been needing assist for ADLS prior to admission  Please refer to the recommendation of the Occupational Therapist for safe DC planning  Plan   Treatment Interventions ADL retraining;Functional transfer training;UE strengthening/ROM; Endurance training;Patient/family training;Equipment evaluation/education; Activityengagement; Compensatory technique education   OT Frequency 3-5x/wk   Recommendation   OT Discharge Recommendation Home with home health rehabilitation   AM-[facility] Daily Activity Inpatient   Lower Body Dressing 2   Bathing 2   Toileting 3   Upper Body Dressing 3   Grooming 3   Eating 4   Daily Activity Raw Score 17   Daily Activity Standardized Score (Calc for Raw Score >=11) 37 26   AM-PAC Applied Cognition Inpatient   Following a Speech/Presentation 4   Understanding Ordinary Conversation 4   Taking Medications 3   Remembering Where Things Are Placed or Put Away 3   Remembering List of 4-5 Errands 3   Taking Care of Complicated Tasks 3   Applied Cognition Raw Score 20   Applied Cognition Standardized Score 41 64   Licensure   [place] License Number  Suy[place] Garcia Jack Chacho 89 OTR/L [id number]

## 2022-05-20 NOTE — PROGRESS NOTES
Progress Note - Pulmonary   Johnnie Manner 73 y o  female MRN: [id number]  Unit/Bed#: 2 [place] [address] Encounter: [id number]    Assessment and Plan:  1  Acute on chronic respiratory failure with hypoxia and hypercarbia  Currently improved  On nasal cannula oxygen during day  ABG on room air showed elevated pCO2 at 47  We will do a overnight oximetry while on room air  2  Obesity hypoventilation syndrome:  Her previous sleep study was negative  She likely has obesity hypoventilation syndrome  We will see whether she qualifies for home BiPAP therapy  3  Asthma:  Currently on bronchodilator therapy with levalbuterol and ipratropium  4  Acute on chronic diastolic congestive heart failure:  Currently better responded well to diuresis  Spoke to patient  Answered all questions  Thank you for allowing me to participate in the care of the patient  Chief Complaint:   Shortness of breath    Subjective:   Currently shortness of breath is better  Denies any cough or phlegm  No wheezing or chest pain    Objective:     Vitals: Blood pressure 142/56, pulse 67, temperature 98 3 °F (36 8 °C), resp  rate 17, height 5' 2" (1 575 m), weight 114 kg (250 lb 14 1 oz), SpO2 100 %, not currently breastfeeding  ,Body mass index is 45 89 kg/m²  Intake/Output Summary (Last 24 hours) at 5/20/2022 1745  Last data filed at 5/20/2022 1711  Gross per 24 hour   Intake 630 ml   Output 1650 ml   Net -1020 ml       Invasive Devices  Report    Peripheral Intravenous Line  Duration           Peripheral IV 05/18/22 Left; Lower Arm 2 days                Physical Exam:  On clinical examination, she is out of bed to chair  She is hemodynamically stable and afebrile  Saturating well on 2 L of nasal cannula oxygen at 98%  She is obese  HEENT:  No conjunctival pallor no cyanosis  Heart:  S1-S2 heard  Neck:  No jugular venous distention  Chest air entry present bilaterally no crackles or rhonchi    Abdomen soft and nontender neurologically awake alert oriented  Extremities trace edema bilaterally  Labs: I have personally reviewed pertinent lab results  ABG showed a pH of 7 49 with pCO2 of 47 7  Bicarbonate 37  Hemoglobin 9  White cell count normal   Creatinine 1 58    Imaging and other studies: I have personally reviewed pertinent reports

## 2022-05-21 LAB
ANION GAP SERPL CALCULATED.3IONS-SCNC: 5 MMOL/L (ref 4–13)
BACTERIA BLD CULT: NORMAL
BUN SERPL-MCNC: 38 MG/DL (ref 5–25)
CALCIUM SERPL-MCNC: 8.7 MG/DL (ref 8.3–10.1)
CHLORIDE SERPL-SCNC: 100 MMOL/L (ref 100–108)
CO2 SERPL-SCNC: 38 MMOL/L (ref 21–32)
CREAT SERPL-MCNC: 1.54 MG/DL (ref 0.6–1.3)
ERYTHROCYTE [DISTWIDTH] IN BLOOD BY AUTOMATED COUNT: 14.4 % (ref 11.6–15.1)
GFR SERPL CREATININE-BSD FRML MDRD: 34 ML/MIN/1.73SQ M
GLUCOSE SERPL-MCNC: 114 MG/DL (ref 65–140)
GLUCOSE SERPL-MCNC: 141 MG/DL (ref 65–140)
GLUCOSE SERPL-MCNC: 167 MG/DL (ref 65–140)
GLUCOSE SERPL-MCNC: 182 MG/DL (ref 65–140)
GLUCOSE SERPL-MCNC: 185 MG/DL (ref 65–140)
HCT VFR BLD AUTO: 30.4 % (ref 34.8–46.1)
HGB BLD-MCNC: 9 G/DL (ref 11.5–15.4)
MCH RBC QN AUTO: 27.7 PG (ref 26.8–34.3)
MCHC RBC AUTO-ENTMCNC: 29.6 G/DL (ref 31.4–37.4)
MCV RBC AUTO: 94 FL (ref 82–98)
PLATELET # BLD AUTO: 159 THOUSANDS/UL (ref 149–390)
PMV BLD AUTO: 10.9 FL (ref 8.9–12.7)
POTASSIUM SERPL-SCNC: 4 MMOL/L (ref 3.5–5.3)
RBC # BLD AUTO: 3.25 MILLION/UL (ref 3.81–5.12)
SODIUM SERPL-SCNC: 143 MMOL/L (ref 136–145)
WBC # BLD AUTO: 6.3 THOUSAND/UL (ref 4.31–10.16)

## 2022-05-21 PROCEDURE — 85027 COMPLETE CBC AUTOMATED: CPT | Performed by: INTERNAL MEDICINE

## 2022-05-21 PROCEDURE — 82948 REAGENT STRIP/BLOOD GLUCOSE: CPT

## 2022-05-21 PROCEDURE — 80048 BASIC METABOLIC PNL TOTAL CA: CPT | Performed by: INTERNAL MEDICINE

## 2022-05-21 PROCEDURE — 99232 SBSQ HOSP IP/OBS MODERATE 35: CPT | Performed by: INTERNAL MEDICINE

## 2022-05-21 PROCEDURE — 94640 AIRWAY INHALATION TREATMENT: CPT

## 2022-05-21 PROCEDURE — 94760 N-INVAS EAR/PLS OXIMETRY 1: CPT

## 2022-05-21 PROCEDURE — 94762 N-INVAS EAR/PLS OXIMTRY CONT: CPT

## 2022-05-21 RX ADMIN — FUROSEMIDE 40 MG: 10 INJECTION, SOLUTION INTRAMUSCULAR; INTRAVENOUS at 10:11

## 2022-05-21 RX ADMIN — INSULIN LISPRO 2 UNITS: 100 INJECTION, SOLUTION INTRAVENOUS; SUBCUTANEOUS at 17:05

## 2022-05-21 RX ADMIN — PRAVASTATIN SODIUM 80 MG: 80 TABLET ORAL at 17:07

## 2022-05-21 RX ADMIN — APIXABAN 5 MG: 5 TABLET, FILM COATED ORAL at 17:06

## 2022-05-21 RX ADMIN — INSULIN LISPRO 2 UNITS: 100 INJECTION, SOLUTION INTRAVENOUS; SUBCUTANEOUS at 21:23

## 2022-05-21 RX ADMIN — METOPROLOL TARTRATE 50 MG: 50 TABLET, FILM COATED ORAL at 21:19

## 2022-05-21 RX ADMIN — INSULIN GLARGINE 25 UNITS: 100 INJECTION, SOLUTION SUBCUTANEOUS at 21:23

## 2022-05-21 RX ADMIN — LEVALBUTEROL HYDROCHLORIDE 1.25 MG: 1.25 SOLUTION, CONCENTRATE RESPIRATORY (INHALATION) at 20:07

## 2022-05-21 RX ADMIN — METOPROLOL TARTRATE 50 MG: 50 TABLET, FILM COATED ORAL at 10:11

## 2022-05-21 RX ADMIN — SALINE NASAL SPRAY 1 SPRAY: 1.5 SOLUTION NASAL at 10:10

## 2022-05-21 RX ADMIN — INSULIN LISPRO 2 UNITS: 100 INJECTION, SOLUTION INTRAVENOUS; SUBCUTANEOUS at 12:09

## 2022-05-21 RX ADMIN — LEVALBUTEROL HYDROCHLORIDE 1.25 MG: 1.25 SOLUTION, CONCENTRATE RESPIRATORY (INHALATION) at 08:29

## 2022-05-21 RX ADMIN — NYSTATIN: 100000 POWDER TOPICAL at 10:14

## 2022-05-21 RX ADMIN — APIXABAN 5 MG: 5 TABLET, FILM COATED ORAL at 10:11

## 2022-05-21 RX ADMIN — FUROSEMIDE 40 MG: 10 INJECTION, SOLUTION INTRAMUSCULAR; INTRAVENOUS at 17:06

## 2022-05-21 RX ADMIN — LEVALBUTEROL HYDROCHLORIDE 1.25 MG: 1.25 SOLUTION, CONCENTRATE RESPIRATORY (INHALATION) at 14:19

## 2022-05-21 RX ADMIN — ACETAMINOPHEN 650 MG: 325 TABLET, FILM COATED ORAL at 21:18

## 2022-05-21 RX ADMIN — IPRATROPIUM BROMIDE 0.5 MG: 0.5 SOLUTION RESPIRATORY (INHALATION) at 14:19

## 2022-05-21 RX ADMIN — NYSTATIN: 100000 POWDER TOPICAL at 17:05

## 2022-05-21 RX ADMIN — IPRATROPIUM BROMIDE 0.5 MG: 0.5 SOLUTION RESPIRATORY (INHALATION) at 08:29

## 2022-05-21 RX ADMIN — IPRATROPIUM BROMIDE 0.5 MG: 0.5 SOLUTION RESPIRATORY (INHALATION) at 20:07

## 2022-05-21 NOTE — PROGRESS NOTES
Progress Note - Pulmonary   Jackelyn Chavez 73 y o  female MRN: [id number]  Unit/Bed#: 2 [place] [address] Encounter: [id number]    Assessment and Plan:    1  Acute on chronic respiratory failure with hypoxia and hypercapnia  -no baseline oxygen requirement   -admitted for acute encephalopathy from CHF exacerbation with component of OHS requiring BiPAP therapy  -continues to require 2 L with persistent desaturation overnight  -overnight pulse oximetry suggesting patient's pending around > 2 hours and SpO2 < 89%  -ABG showing mild CO2 retention with compensated metabolic alkalosis  2  OHS  3  Asthma on p r n  Bronchodilators  4  Acute on chronic diastolic CHF, on diuretics, cardiology following  5  AFib on Eliquis, rate controlled  6  Morbid obesity  7  DM  8  SEBASTIAN on CKD likely from CHF  9  Mild KATRINA    Plan:-  -given Pt is pending around 2 hours in SpO2 < 89% in overnight pulse oximetry and ABG showing CO2 retention Pt qualifies for BiPAP  -BiPAP setting will be 12/5/30%  -continue albuterol p r n  With Singulair as before  -primary team was updated  -CM follow-up regarding BiPAP on discharge  -exercise and dietary modification were encouraged  -encouraged weight loss    Will sign off  Thank you for the consultation  Re-consult as needed  Subjective:   Currently shortness of breath is better  Denies any cough or phlegm  No wheezing or chest pain    Objective:     Vitals: Blood pressure 139/58, pulse 61, temperature 97 9 °F (36 6 °C), resp  rate 16, height 5' 2" (1 575 m), weight 113 kg (249 lb 12 5 oz), SpO2 99 %, not currently breastfeeding  ,Body mass index is 45 69 kg/m²  Intake/Output Summary (Last 24 hours) at 5/21/2022 1157  Last data filed at 5/21/2022 0317  Gross per 24 hour   Intake --   Output 1150 ml   Net -1150 ml       Invasive Devices  Report    Peripheral Intravenous Line  Duration           Peripheral IV 05/18/22 Left; Lower Arm 3 days                Physical Exam:  On clinical examination, she is out of bed to chair  She is hemodynamically stable and afebrile  Saturating well on 2 L of nasal cannula oxygen at 98%  She is obese  HEENT:  No conjunctival pallor no cyanosis  Heart:  S1-S2 heard  Neck:  No jugular venous distention  Chest air entry present bilaterally no crackles or rhonchi  Abdomen soft and nontender neurologically awake alert oriented  Extremities trace edema bilaterally  Labs: I have personally reviewed pertinent lab results  Results Reviewed     Procedure Component Value Units Date/Time    Blood culture [[id number]] Collected: 05/16/22 1215    Lab Status: Preliminary result Specimen: Blood from Arm, Left Updated: 05/20/22 1404     Blood Culture No Growth After 4 Days  Blood culture [[id number]]  (Abnormal) Collected: 05/16/22 1215    Lab Status: Final result Specimen: Blood from Arm, Right Updated: 05/19/22 1113     Blood Culture Staphylococcus coagulase negative     Gram Stain Result Gram positive cocci in clusters    Narrative:      Susceptibility testing will not be performed as this organism, when isolated from a single set of blood cultures, represents probable skin jaylen contamination  Please call the Microbiology Laboratory within 5 days at [phone] if further workup is required  Blood Culture Identification Panel [[id number]]  (Abnormal) Collected: 05/16/22 1215    Lab Status: Final result Specimen: Blood from Arm, Right Updated: 05/19/22 1113     Staphylococcus epidermidis Detected     mecA/C (Methicillin-resistance gene) Detected    Narrative:      Culture and susceptiblity to follow      Procalcitonin [[id number]]  (Abnormal) Collected: 05/17/22 0548    Lab Status: Final result Specimen: Blood from Arm, Left Updated: 05/17/22 0622     Procalcitonin 1 15 ng/ml     Comprehensive metabolic panel [[id number]]  (Abnormal) Collected: 05/17/22 0548    Lab Status: Final result Specimen: Blood from Arm, Left Updated: 05/17/22 2841     Sodium 144 mmol/L      Potassium 5 0 mmol/L      Chloride 103 mmol/L      CO2 37 mmol/L      ANION GAP 4 mmol/L      BUN 46 mg/dL      Creatinine 1 97 mg/dL      Glucose 123 mg/dL      Calcium 8 6 mg/dL      Corrected Calcium 9 5 mg/dL      AST 13 U/L      ALT 15 U/L      Alkaline Phosphatase 59 U/L      Total Protein 7 0 g/dL      Albumin 2 9 g/dL      Total Bilirubin 0 22 mg/dL      eGFR 25 ml/min/1 73sq m     Narrative:      [employer] guidelines for Chronic Kidney Disease (CKD):     Stage 1 with normal or high GFR (GFR > 90 mL/min/1 73 square meters)    Stage 2 Mild CKD (GFR = 60-89 mL/min/1 73 square meters)    Stage 3A Moderate CKD (GFR = 45-59 mL/min/1 73 square meters)    Stage 3B Moderate CKD (GFR = 30-44 mL/min/1 73 square meters)    Stage 4 Severe CKD (GFR = 15-29 mL/min/1 73 square meters)    Stage 5 End Stage CKD (GFR <15 mL/min/1 73 square meters)  Note: GFR calculation is accurate only with a steady state creatinine    Magnesium [[id number]]  (Normal) Collected: 05/17/22 0548    Lab Status: Final result Specimen: Blood from Arm, Left Updated: 05/17/22 0613     Magnesium 2 1 mg/dL     Phosphorus [[id number]]  (Normal) Collected: 05/17/22 0548    Lab Status: Final result Specimen: Blood from Arm, Left Updated: 05/17/22 0613     Phosphorus 3 6 mg/dL     CBC and differential [[id number]]  (Abnormal) Collected: 05/17/22 0548    Lab Status: Final result Specimen: Blood from Arm, Left Updated: 05/17/22 0556     WBC 8 26 Thousand/uL      RBC 3 12 Million/uL      Hemoglobin 8 8 g/dL      Hematocrit 29 4 %      MCV 94 fL      MCH 28 2 pg      MCHC 29 9 g/dL      RDW 14 4 %      MPV 10 9 fL      Platelets 078 Thousands/uL      nRBC 0 /100 WBCs      Neutrophils Relative 63 %      Immat GRANS % 0 %      Lymphocytes Relative 24 %      Monocytes Relative 10 %      Eosinophils Relative 2 %      Basophils Relative 1 %      Neutrophils Absolute 5 28 Thousands/µL      Immature Grans Absolute 0 02 Thousand/uL      Lymphocytes Absolute 1 96 Thousands/µL      Monocytes Absolute 0 81 Thousand/µL      Eosinophils Absolute 0 14 Thousand/µL      Basophils Absolute 0 05 Thousands/µL     Urinalysis with microscopic [[id number]]  (Abnormal) Collected: 05/16/22 1904    Lab Status: Final result Specimen: Urine, Straight Cath Updated: 05/16/22 1917     Clarity, UA Clear     Color, UA Light Yellow     Specific Gravity, UA 1 025     pH, UA 5 5     Glucose, UA Negative mg/dl      Ketones, UA Negative mg/dl      Blood, UA Small     Protein, UA 30 (1+) mg/dl      Nitrite, UA Negative     Bilirubin, UA Negative     Urobilinogen, UA 0 2 E U /dl      Leukocytes, UA Trace     WBC, UA 4-10 /hpf      RBC, UA 1-2 /hpf      Bacteria, UA Occasional /hpf      Epithelial Cells Occasional /hpf      MUCUS THREADS Occasional    HS Troponin I 4hr [[id number]]  (Normal) Collected: 05/16/22 1309    Lab Status: Final result Specimen: Blood from Arm, Right Updated: 05/16/22 1338     hs TnI 4hr 27 ng/L      Delta 4hr hsTnI 12 ng/L     Fingerstick Glucose (POCT) [[id number]]  (Abnormal) Collected: 05/16/22 1310    Lab Status: Final result Updated: 05/16/22 1312     POC Glucose 332 mg/dl     COVID/FLU/RSV [[id number]]  (Normal) Collected: 05/16/22 1125    Lab Status: Final result Specimen: Nares from Nose Updated: 05/16/22 1217     SARS-CoV-2 Negative     INFLUENZA A PCR Negative     INFLUENZA B PCR Negative     RSV PCR Negative    Narrative:      FOR PEDIATRIC PATIENTS - copy/paste COVID Guidelines URL to browser: [email or web address]  [email or web address]    SARS-CoV-2 assay is a Nucleic Acid Amplification assay intended for the  qualitative detection of nucleic acid from SARS-CoV-2 in nasopharyngeal  swabs  Results are for the presumptive identification of SARS-CoV-2 RNA  Positive results are indicative of infection with SARS-CoV-2, the virus  causing COVID-19, but do not rule out bacterial infection or co-infection  with other viruses  Laboratories within the [place] and its  territories are required to report all positive results to the appropriate  public health authorities  Negative results do not preclude SARS-CoV-2  infection and should not be used as the sole basis for treatment or other  patient management decisions  Negative results must be combined with  clinical observations, patient history, and epidemiological information  This test has not been FDA cleared or approved  This test has been authorized by FDA under an Emergency Use Authorization  (EUA)  This test is only authorized for the duration of time the  declaration that circumstances exist justifying the authorization of the  emergency use of an in vitro diagnostic tests for detection of SARS-CoV-2  virus and/or diagnosis of COVID-19 infection under section 564(b)(1) of  the Act, 21 U  S C  670ELO-8(J)(9), unless the authorization is terminated  or revoked sooner  The test has been validated but independent review by FDA  and CLIA is pending  Test performed using [employer] GeneXpert: This RT-PCR assay targets N2,  a region unique to SARS-CoV-2  A conserved region in the E-gene was chosen  for pan-Sarbecovirus detection which includes SARS-CoV-2      Procalcitonin [[id number]]  (Normal) Collected: 05/16/22 0901    Lab Status: Final result Specimen: Blood from Arm, Right Updated: 05/16/22 1216     Procalcitonin 0 07 ng/ml     HS Troponin I 2hr [[id number]]  (Normal) Collected: 05/16/22 1125    Lab Status: Final result Specimen: Blood from Arm, Right Updated: 05/16/22 1205     hs TnI 2hr 23 ng/L      Delta 2hr hsTnI 8 ng/L     Blood gas, venous [[id number]]  (Abnormal) Collected: 05/16/22 1055    Lab Status: Final result Specimen: Blood from Arm, Left Updated: 05/16/22 1105     pH, Pillo 7 155     pCO2, Pillo 95 8 mm Hg      pO2, Pillo 68 8 mm Hg      HCO3, Pillo 33 0 mmol/L      Base Excess, Pillo 1 9 mmol/L      O2 Content, Pillo 14 6 ml/dL      O2 HGB, VENOUS 89 1 %     HS Troponin 0hr (reflex protocol) [[id number]]  (Normal) Collected: 05/16/22 0901    Lab Status: Final result Specimen: Blood from Arm, Right Updated: 05/16/22 0942     hs TnI 0hr 15 ng/L     NT-BNP PRO [[id number]]  (Abnormal) Collected: 05/16/22 0901    Lab Status: Final result Specimen: Blood from Arm, Right Updated: 05/16/22 0941     NT-proBNP 3,158 pg/mL     Comprehensive metabolic panel [[id number]]  (Abnormal) Collected: 05/16/22 0901    Lab Status: Final result Specimen: Blood from Arm, Right Updated: 05/16/22 0933     Sodium 143 mmol/L      Potassium 5 2 mmol/L      Chloride 102 mmol/L      CO2 37 mmol/L      ANION GAP 4 mmol/L      BUN 41 mg/dL      Creatinine 2 13 mg/dL      Glucose 334 mg/dL      Calcium 8 6 mg/dL      Corrected Calcium 9 2 mg/dL      AST 19 U/L      ALT 18 U/L      Alkaline Phosphatase 84 U/L      Total Protein 8 3 g/dL      Albumin 3 3 g/dL      Total Bilirubin 0 15 mg/dL      eGFR 23 ml/min/1 73sq m     Narrative:      [facility] guidelines for Chronic Kidney Disease (CKD):     Stage 1 with normal or high GFR (GFR > 90 mL/min/1 73 square meters)    Stage 2 Mild CKD (GFR = 60-89 mL/min/1 73 square meters)    Stage 3A Moderate CKD (GFR = 45-59 mL/min/1 73 square meters)    Stage 3B Moderate CKD (GFR = 30-44 mL/min/1 73 square meters)    Stage 4 Severe CKD (GFR = 15-29 mL/min/1 73 square meters)    Stage 5 End Stage CKD (GFR <15 mL/min/1 73 square meters)  Note: GFR calculation is accurate only with a steady state creatinine    CBC and differential [[id number]]  (Abnormal) Collected: 05/16/22 0901    Lab Status: Final result Specimen: Blood from Arm, Right Updated: 05/16/22 0929     WBC 16 86 Thousand/uL      RBC 3 76 Million/uL      Hemoglobin 10 5 g/dL      Hematocrit 37 2 %      MCV 99 fL      MCH 27 9 pg      MCHC 28 2 g/dL      RDW 14 4 %      MPV 10 9 fL      Platelets 428 Thousands/uL      nRBC 0 /100 WBCs      Neutrophils Relative 82 %      Immat GRANS % 2 % Lymphocytes Relative 9 %      Monocytes Relative 6 %      Eosinophils Relative 0 %      Basophils Relative 1 %      Neutrophils Absolute 13 90 Thousands/µL      Immature Grans Absolute 0 26 Thousand/uL      Lymphocytes Absolute 1 47 Thousands/µL      Monocytes Absolute 1 08 Thousand/µL      Eosinophils Absolute 0 05 Thousand/µL      Basophils Absolute 0 10 Thousands/µL     Blood gas, venous [[id number]]  (Abnormal) Collected: 05/16/22 0901    Lab Status: Final result Specimen: Blood from Arm, Right Updated: 05/16/22 0920     pH, Pillo 7 094     pCO2, Pillo 127 5 mm Hg      pO2, Pillo 55 6 mm Hg      HCO3, Pillo 38 2 mmol/L      Base Excess, Pillo 4 8 mmol/L      O2 Content, Pillo 13 6 ml/dL      O2 HGB, VENOUS 80 4 %         Imaging and other studies: I have personally reviewed pertinent reports

## 2022-05-21 NOTE — PROGRESS NOTES
Progress Note - Cardiology   [facility]     Juan Diego Ellis 73 y o  female MRN: [id number]  : 1950  Unit/Bed#: [address] [place] [address] Encounter: [id number]    ASSESSMENT:   Acute on chronic diastolic heart failure due to volume overload   EF 53%       PAF, rate controlled and anticoagulated      Hypertension , BP today is 139/58 mmHg with heart rate of 61 per minute      Morbid obesity   KATRINA   Diabetes mellitus   Dyslipidemia   SEBASTIAN on CKD , BUN/CR 38/1 54              RECOMMENDATIONS:   Continue IV Lasix 40 mg b i d    Continue Eliquis, and statin   Metoprolol increased to 50 mg q 12 hours  Treatment for KATRINA if indicated    Monitor daily weight, I/O, and electrolytes/renal function                Subjective / Objective:     Review of Systems   Up in chair  States that she feels much better and denies any symptoms at rest  Vitals: Blood pressure 139/58, pulse 61, temperature 97 9 °F (36 6 °C), resp  rate 16, height 5' 2" (1 575 m), weight 113 kg (249 lb 12 5 oz), SpO2 99 %, not currently breastfeeding  Vitals:    22 0550 22 0556   Weight: 114 kg (250 lb 14 1 oz) 113 kg (249 lb 12 5 oz)     Body mass index is 45 69 kg/m²  BP Readings from Last 3 Encounters:   22 139/58   22 146/65   22 126/62     Orthostatic Blood Pressures    Flowsheet Row Most Recent Value   Blood Pressure 139/58 filed at 2022 0729   Patient Position - Orthostatic VS Lying filed at 2022 1740        I/O        0701   0700  0701   0700  0701   0700    P  O  537 300     I V  (mL/kg) 20 (0 2) 10 (0 1)     IV Piggyback       Total Intake(mL/kg) 557 (4 9) 310 (2 7)     Urine (mL/kg/hr) 550 (0 2) 1900 (0 7)     Stool 0 0     Total Output 550 1900     Net +7 -1590            Unmeasured Urine Occurrence 3 x      Unmeasured Stool Occurrence 2 x 1 x         Invasive Devices  Report    Peripheral Intravenous Line  Duration           Peripheral IV 22 Left; Lower Arm 3 days                  Intake/Output Summary (Last 24 hours) at 5/21/2022 1026  Last data filed at 5/21/2022 2616  Gross per 24 hour   Intake --   Output 1150 ml   Net -1150 ml         Physical Exam:     Neurologic:  Alert & oriented x 3,  no focal deficits noted   Constitutional:  Obese  Eyes:  PERRL, conjunctiva normal   HENT:  Atraumatic, external ears normal, nose normal,   NECK: Normal range of motion, no tenderness, neck is supple ,  Respiratory:  Decreased breath sounds in the bases with few bibasal crepitations  Cardiovascular: Regular S1-S2 with a I/VI ejection systolic murmur  No pericardial rub or gallop audible  GI:  Soft, nondistended, audible bowel sounds, nontender, no hepatosplenomegaly appreciated  Musculoskeletal:  No tenderness, no deformities  Extremities:  Trace edema     Psychiatric:  Speech and behavior appropriate              Medications/ Allergies:     Current Facility-Administered Medications   Medication Dose Route Frequency Provider Last Rate    acetaminophen  650 mg Oral Q6H PRN Madhav Fischer MD      albuterol  2 puff Inhalation Q6H PRN Madhav Fischer MD      apixaban  5 mg Oral BID Madhav Fischer MD      docusate sodium  100 mg Oral Daily Madhav Fischer MD      furosemide  40 mg Intravenous BID (diuretic) RK Elias      insulin glargine  25 Units Subcutaneous HS Madhav Fischer MD      insulin lispro  2-12 Units Subcutaneous 4x Daily (AC & HS) Madhav Fischer MD      ipratropium  0 5 mg Nebulization TID Madhav Fischer MD      levalbuterol  1 25 mg Nebulization TID Madhav Fischer MD      metoprolol tartrate  50 mg Oral Q12H [facility] Celso Pickens MD      nystatin   Topical BID Madhav Fischer MD      pravastatin  80 mg Oral Daily With Shailesh Mathews MD      sodium chloride  1 spray Each Nare Q1H PRN Madhav Fischer MD       acetaminophen, 650 mg, Q6H PRN  albuterol, 2 puff, Q6H PRN  sodium chloride, 1 spray, Q1H PRN      Allergies Allergen Reactions    Penicillins Hives    Moxifloxacin Other (See Comments)     unknown    Percocet [Oxycodone-Acetaminophen] Other (See Comments)     unknown    Zinc Acetate Other (See Comments)     unknown    Asa [Aspirin] GI Intolerance    Indocin [Indomethacin] Other (See Comments)     Made patient "loopy"    Other Other (See Comments)     unknown           Labs:   Troponins:      CBC with diff:  Results from last 7 days   Lab Units 05/21/22  0502 05/20/22  0544 05/19/22  0427 05/18/22  0523 05/17/22  0548 05/16/22  0901   WBC Thousand/uL 6 30 6 14 6 85 6 77 8 26 16 86*   HEMOGLOBIN g/dL 9 0* 9 0* 9 1* 8 9* 8 8* 10 5*   HEMATOCRIT % 30 4* 30 2* 30 6* 30 5* 29 4* 37 2   MCV fL 94 94 94 95 94 99*   PLATELETS Thousands/uL 159 155 184 169 181 243   MCH pg 27 7 28 0 28 0 27 7 28 2 27 9   MCHC g/dL 29 6* 29 8* 29 7* 29 2* 29 9* 28 2*   RDW % 14 4 14 5 14 5 14 6 14 4 14 4   MPV fL 10 9 9 9 10 8 10 4 10 9 10 9   NRBC AUTO /100 WBCs  --  0 0 0 0 0       CMP:  Results from last 7 days   Lab Units 05/21/22  0502 05/20/22  0544 05/19/22  0427 05/18/22  0523 05/17/22  0548 05/16/22  0901   SODIUM mmol/L 143 141 143 142 144 143   POTASSIUM mmol/L 4 0 4 4 4 5 4 9 5 0 5 2   CHLORIDE mmol/L 100 101 103 102 103 102   CO2 mmol/L 38* 37* 38* 37* 37* 37*   ANION GAP mmol/L 5 3* 2* 3* 4 4   BUN mg/dL 38* 38* 36* 42* 46* 41*   CREATININE mg/dL 1 54* 1 58* 1 49* 1 74* 1 97* 2 13*   CALCIUM mg/dL 8 7 8 5 8 5 8 8 8 6 8 6   AST U/L  --   --   --   --  13 19   ALT U/L  --   --   --   --  15 18   ALK PHOS U/L  --   --   --   --  59 84   TOTAL PROTEIN g/dL  --   --   --   --  7 0 8 3*   ALBUMIN g/dL  --   --   --   --  2 9* 3 3*   TOTAL BILIRUBIN mg/dL  --   --   --   --  0 22 0 15*   EGFR ml/min/1 73sq m 34 33 35 29 25 23       Magnesium:  Results from last 7 days   Lab Units 05/19/22  0427 05/18/22  0523 05/17/22  0548   MAGNESIUM mg/dL 2 1 2 4 2 1     Coags:    TSH:    No components found for: TSH3  Lipid Profile:    Hgb A1c: NT-proBNP: No results for input(s): NTBNP in the last 72 hours  Imaging & Testing   I have personally reviewed pertinent reports  XR chest 1 view portable    Result Date: 5/16/2022  Narrative: CHEST INDICATION:   shortness of breath, respiratory distress  COMPARISON:  5/13/2022 EXAM PERFORMED/VIEWS:  XR CHEST PORTABLE FINDINGS: Cardiomediastinal silhouette appears mildly enlarged with mild increased vascular congestion and increased bilateral pleural effusions  No pneumothorax  Left shoulder arthroplasty again noted  Impression: Mild cardiomegaly Mild increased vascular congestion Increased bilateral pleural effusions Workstation performed: [id number]     XR chest 1 view portable    Result Date: 5/13/2022  Narrative: CHEST INDICATION:   SOB  COMPARISON:  Chest radiograph April 1, 2022 EXAM PERFORMED/VIEWS:  XR CHEST PORTABLE FINDINGS: Cardiomediastinal silhouette appears unremarkable  New mild prominence of the interstitial markings  Small bilateral pleural effusions  No pneumothorax Left shoulder arthroplasty  Impression: Mild pulmonary vascular congestion with small bilateral pleural effusions  Workstation performed: [id number]     CT head without contrast    Result Date: 5/16/2022  Narrative: CT BRAIN - WITHOUT CONTRAST INDICATION:   Mental status change, unknown cause altered mental status  COMPARISON:  2/17/2022 TECHNIQUE:  CT examination of the brain was performed  In addition to axial images, sagittal and coronal 2D reformatted images were created and submitted for interpretation  Radiation dose length product (DLP) for this visit:  912 34 mGy-cm   This examination, like all CT scans performed in the [facility], was performed utilizing techniques to minimize radiation dose exposure, including the use of iterative  reconstruction and automated exposure control  IMAGE QUALITY:  Diagnostic   FINDINGS: PARENCHYMA: Decreased attenuation is noted in periventricular and subcortical white matter demonstrating an appearance that is statistically most likely to represent mild microangiopathic change  No CT signs of acute infarction  No intracranial mass, mass effect or midline shift  No acute parenchymal hemorrhage  VENTRICLES AND EXTRA-AXIAL SPACES:  Normal for the patient's age  VISUALIZED ORBITS AND PARANASAL SINUSES:  There is a left mastoid effusion, not present previously  There is mild left sphenoid sinus and ethmoid air cell mucosal thickening  CALVARIUM AND EXTRACRANIAL SOFT TISSUES:  Normal      Impression: 1  No acute intracranial abnormality  2   New left mastoid effusion  Correlation for acute mastoiditis recommended  Workstation performed: [id number]          Cardiac testing:   Results for orders placed during the hospital encounter of 19    Echo complete with contrast if indicated    Narrative  [address]  [place]  [phone]    Transthoracic Echocardiogram  2D, M-mode, Doppler, and Color Doppler    Study date:  29-May-2019    Patient: Cecile Vaca  MR number: [id number]  Account number: [de-identified]  : 1950  Age: 79 years  Gender: Female  Status: Inpatient  Location: Bedside  Height: 62 in  Weight: 250 6 lb  BP: 133/ 62 mmHg    Indications: Tachycardia    Diagnoses: I11 9 - Hypertensive heart disease without heart failure    Sonographer:  QUETA Woodson  Referring Physician:  Deisi Gomes MD  Group:  [facility]  Interpreting Physician:  Licha Maciel DO    SUMMARY    LEFT VENTRICLE:  Systolic function was normal by visual assessment  Ejection fraction was estimated to be 55 %  There were no regional wall motion abnormalities  Wall thickness was mildly to moderately increased  Doppler parameters were consistent with abnormal left ventricular relaxation (grade 1 diastolic dysfunction)  MITRAL VALVE:  There was mild regurgitation      AORTIC VALVE:  There was no evidence for stenosis  There was no regurgitation  TRICUSPID VALVE:  There was mild regurgitation  Pulmonary artery systolic pressure was within the normal range  HISTORY: PRIOR HISTORY: Diabetes,Diabetes, HTN, Hyperlipidemia, A Fib  PROCEDURE: The procedure was performed at the bedside  This was a routine study  The transthoracic approach was used  The study included complete 2D imaging, M-mode, complete spectral Doppler, and color Doppler  The heart rate was 77 bpm,  at the start of the study  Images were obtained from the parasternal, apical, subcostal, and suprasternal notch acoustic windows  Echocardiographic views were limited due to restricted patient mobility, poor patient compliance, poor  acoustic window availability, decreased penetration, and lung interference  This was a technically difficult study  LEFT VENTRICLE: Size was normal  Systolic function was normal by visual assessment  Ejection fraction was estimated to be 55 %  There were no regional wall motion abnormalities  Wall thickness was mildly to moderately increased  DOPPLER:  Doppler parameters were consistent with abnormal left ventricular relaxation (grade 1 diastolic dysfunction)  RIGHT VENTRICLE: The size was normal  Systolic function was normal     LEFT ATRIUM: The atrium was mildly dilated  RIGHT ATRIUM: Size was normal     MITRAL VALVE: Valve structure was normal  There was normal leaflet separation  No echocardiographic evidence for prolapse  DOPPLER: The transmitral velocity was within the normal range  There was no evidence for stenosis  There was mild  regurgitation  AORTIC VALVE: The valve was trileaflet  Leaflets exhibited normal thickness, normal cuspal separation, and sclerosis  DOPPLER: Transaortic velocity was within the normal range  There was no evidence for stenosis  There was no  regurgitation  TRICUSPID VALVE: The valve structure was normal  There was normal leaflet separation   DOPPLER: The transtricuspid velocity was within the normal range  There was mild regurgitation  Pulmonary artery systolic pressure was within the normal  range  Estimated peak PA pressure was 32 mmHg  PULMONIC VALVE: Leaflets exhibited normal thickness, no calcification, and normal cuspal separation  DOPPLER: The transpulmonic velocity was within the normal range  There was no regurgitation  PERICARDIUM: There was no thickening  There was no pericardial effusion  AORTA: The root exhibited normal size  PULMONARY ARTERY: The size was normal  The morphology appeared normal     SYSTEM MEASUREMENT TABLES    2D mode  AoR Diam 2D: 3 6 cm  LA Diam (2D): 3 9 cm  LA/Ao (2D): 1 08  FS (2D Teich): 26 9 %  IVSd (2D): 1 29 cm  LVDEV: 75 1 cmï¾³  LVESV: 35 3 cmï¾³  LVIDd(2D): 4 12 cm  LVISd (2D): 3 01 cm  LVOT Area 2D: 3 14 cmï¾²  LVPWd (2D): 1 2 cm  SV (Teich): 39 8 cmï¾³    Apical four chamber  LVEF A4C: 51 %    Unspecified Scan Mode  HANNA Cont Eq (Peak Gutierrez): 2 58 cmï¾²  LVOT Diam : 2 cm  LVOT Vmax: 963 mm/s  LVOT Vmax; Mean: 963 mm/s  Peak Grad ; Mean: 4 mm[Hg]  MV Peak A Gutierrez: 893 mm/s  MV Peak E Gutierrez  Mean: 805 mm/s  MVA (PHT): 3 67 cmï¾²  PHT: 60 ms  Max P mm[Hg]  V Max: 2360 mm/s  Vmax: 2430 mm/s  RA Area: 12 8 cmï¾²  RA Volume: 27 6 cmï¾³  TAPSE: 2 cm    [employer] Accredited Echocardi[facility]    Prepared and electronically signed by    Clara Stephens DO  Signed 29-May-2019 16:19:07          Dr Hermila Garcia MD, [facility]      "This note has been constructed using a voice recognition system  Therefore there may be syntax, spelling, and/or grammatical errors   Please call if you have any questions  "

## 2022-05-21 NOTE — RESPIRATORY THERAPY NOTE
Overnight oximetry done on RA from 2100 to 0400   Pt was placed back on 2 lpm at 0400 due to low spo2 less than 80%

## 2022-05-21 NOTE — PLAN OF CARE
Problem: RESPIRATORY - ADULT  Goal: Achieves optimal ventilation and oxygenation  Description: INTERVENTIONS:  - Assess for changes in respiratory status  - Assess for changes in mentation and behavior  - Position to facilitate oxygenation and minimize respiratory effort  - Oxygen administered by appropriate delivery if ordered  - Initiate smoking cessation education as indicated  - Encourage broncho-pulmonary hygiene including cough, deep breathe, Incentive Spirometry  - Assess the need for suctioning and aspirate as needed  - Assess and instruct to report SOB or any respiratory difficulty  - Respiratory Therapy support as indicated  Outcome: Progressing     Problem: MOBILITY - ADULT  Goal: Maintain or return to baseline ADL function  Description: INTERVENTIONS:  -  Assess patient's ability to carry out ADLs; assess patient's baseline for ADL function and identify physical deficits which impact ability to perform ADLs (bathing, care of mouth/teeth, toileting, grooming, dressing, etc )  - Assess/evaluate cause of self-care deficits   - Assess range of motion  - Assess patient's mobility; develop plan if impaired  - Assess patient's need for assistive devices and provide as appropriate  - Encourage maximum independence but intervene and supervise when necessary  - Involve family in performance of ADLs  - Assess for home care needs following discharge   - Consider OT consult to assist with ADL evaluation and planning for discharge  - Provide patient education as appropriate  Outcome: Progressing  Goal: Maintains/Returns to pre admission functional level  Description: INTERVENTIONS:  - Perform BMAT or MOVE assessment daily    - Set and communicate daily mobility goal to care team and patient/family/caregiver  - Collaborate with rehabilitation services on mobility goals if consulted  - Perform Range of Motion 2 times a day    - Stand patient 3 times a day  - Ambulate patient 2 times a day  - Out of bed to chair 3 times a day   - Out of bed for meals 3 times a day  - Out of bed for toileting  - Record patient progress and toleration of activity level   Outcome: Progressing     Problem: Potential for Falls  Goal: Patient will remain free of falls  Description: INTERVENTIONS:  - Educate patient/family on patient safety including physical limitations  - Instruct patient to call for assistance with activity   - Consult OT/PT to assist with strengthening/mobility   - Keep Call bell within reach  - Keep bed low and locked with side rails adjusted as appropriate  - Keep care items and personal belongings within reach  - Initiate and maintain comfort rounds  - Make Fall Risk Sign visible to staff  - Offer Toileting every 2 Hours, in advance of need  - Initiate/Maintain Bedalarm  - Obtain necessary fall risk management equipment: walker, commode  - Apply yellow socks and bracelet for high fall risk patients  - Consider moving patient to room near nurses station  Outcome: Progressing     Problem: Prexisting or High Potential for Compromised Skin Integrity  Goal: Skin integrity is maintained or improved  Description: INTERVENTIONS:  - Identify patients at risk for skin breakdown  - Assess and monitor skin integrity  - Assess and monitor nutrition and hydration status  - Monitor labs   - Assess for incontinence   - Turn and reposition patient  - Assist with mobility/ambulation  - Relieve pressure over bony prominences  - Avoid friction and shearing  - Provide appropriate hygiene as needed including keeping skin clean and dry  - Evaluate need for skin moisturizer/barrier cream  - Collaborate with interdisciplinary team   - Patient/family teaching  - Consider wound care consult   Outcome: Progressing     Problem: Nutrition/Hydration-ADULT  Goal: Nutrient/Hydration intake appropriate for improving, restoring or maintaining nutritional needs  Description: Monitor and assess patient's nutrition/hydration status for malnutrition   Collaborate with interdisciplinary team and initiate plan and interventions as ordered  Monitor patient's weight and dietary intake as ordered or per policy  Utilize nutrition screening tool and intervene as necessary  Determine patient's food preferences and provide high-protein, high-caloric foods as appropriate       INTERVENTIONS:  - Monitor oral intake, urinary output, labs, and treatment plans  - Assess nutrition and hydration status and recommend course of action  - Evaluate amount of meals eaten  - Assist patient with eating if necessary   - Allow adequate time for meals  - Recommend/ encourage appropriate diets, oral nutritional supplements, and vitamin/mineral supplements  - Order, calculate, and assess calorie counts as needed  - Recommend, monitor, and adjust tube feedings and TPN/PPN based on assessed needs  - Assess need for intravenous fluids  - Provide specific nutrition/hydration education as appropriate  - Include patient/family/caregiver in decisions related to nutrition  Outcome: Progressing

## 2022-05-21 NOTE — PLAN OF CARE
Problem: RESPIRATORY - ADULT  Goal: Achieves optimal ventilation and oxygenation  Description: INTERVENTIONS:  - Assess for changes in respiratory status  - Assess for changes in mentation and behavior  - Position to facilitate oxygenation and minimize respiratory effort  - Oxygen administered by appropriate delivery if ordered  - Initiate smoking cessation education as indicated  - Encourage broncho-pulmonary hygiene including cough, deep breathe, Incentive Spirometry  - Assess the need for suctioning and aspirate as needed  - Assess and instruct to report SOB or any respiratory difficulty  - Respiratory Therapy support as indicated  Outcome: Progressing     Problem: MOBILITY - ADULT  Goal: Maintain or return to baseline ADL function  Description: INTERVENTIONS:  -  Assess patient's ability to carry out ADLs; assess patient's baseline for ADL function and identify physical deficits which impact ability to perform ADLs (bathing, care of mouth/teeth, toileting, grooming, dressing, etc )  - Assess/evaluate cause of self-care deficits   - Assess range of motion  - Assess patient's mobility; develop plan if impaired  - Assess patient's need for assistive devices and provide as appropriate  - Encourage maximum independence but intervene and supervise when necessary  - Involve family in performance of ADLs  - Assess for home care needs following discharge   - Consider OT consult to assist with ADL evaluation and planning for discharge  - Provide patient education as appropriate  Outcome: Progressing  Goal: Maintains/Returns to pre admission functional level  Description: INTERVENTIONS:  - Perform BMAT or MOVE assessment daily    - Set and communicate daily mobility goal to care team and patient/family/caregiver  - Collaborate with rehabilitation services on mobility goals if consulted  - Perform Range of Motion 3 times a day  - Reposition patient every 2 hours    - Dangle patient 3 times a day  - Stand patient 3 times a day  - Ambulate patient 3 times a day  - Out of bed to chair 3 times a day   - Out of bed for meals 3 times a day  - Out of bed for toileting  - Record patient progress and toleration of activity level   Outcome: Progressing     Problem: Potential for Falls  Goal: Patient will remain free of falls  Description: INTERVENTIONS:  - Educate patient/family on patient safety including physical limitations  - Instruct patient to call for assistance with activity   - Consult OT/PT to assist with strengthening/mobility   - Keep Call bell within reach  - Keep bed low and locked with side rails adjusted as appropriate  - Keep care items and personal belongings within reach  - Initiate and maintain comfort rounds  - Make Fall Risk Sign visible to staff  - Offer Toileting every 2 Hours, in advance of need  - Initiate/Maintain alarm  - Obtain necessary fall risk management equipment:   - Apply yellow socks and bracelet for high fall risk patients  - Consider moving patient to room near nurses station  Outcome: Progressing     Problem: Prexisting or High Potential for Compromised Skin Integrity  Goal: Skin integrity is maintained or improved  Description: INTERVENTIONS:  - Identify patients at risk for skin breakdown  - Assess and monitor skin integrity  - Assess and monitor nutrition and hydration status  - Monitor labs   - Assess for incontinence   - Turn and reposition patient  - Assist with mobility/ambulation  - Relieve pressure over bony prominences  - Avoid friction and shearing  - Provide appropriate hygiene as needed including keeping skin clean and dry  - Evaluate need for skin moisturizer/barrier cream  - Collaborate with interdisciplinary team   - Patient/family teaching  - Consider wound care consult   Outcome: Progressing     Problem: Nutrition/Hydration-ADULT  Goal: Nutrient/Hydration intake appropriate for improving, restoring or maintaining nutritional needs  Description: Monitor and assess patient's nutrition/hydration status for malnutrition  Collaborate with interdisciplinary team and initiate plan and interventions as ordered  Monitor patient's weight and dietary intake as ordered or per policy  Utilize nutrition screening tool and intervene as necessary  Determine patient's food preferences and provide high-protein, high-caloric foods as appropriate       INTERVENTIONS:  - Monitor oral intake, urinary output, labs, and treatment plans  - Assess nutrition and hydration status and recommend course of action  - Evaluate amount of meals eaten  - Assist patient with eating if necessary   - Allow adequate time for meals  - Recommend/ encourage appropriate diets, oral nutritional supplements, and vitamin/mineral supplements  - Order, calculate, and assess calorie counts as needed  - Recommend, monitor, and adjust tube feedings and TPN/PPN based on assessed needs  - Assess need for intravenous fluids  - Provide specific nutrition/hydration education as appropriate  - Include patient/family/caregiver in decisions related to nutrition  Outcome: Progressing

## 2022-05-21 NOTE — PROGRESS NOTES
[facility] Internal Medicine Progress Note  Patient: Rebecca Martinez 73 y o  female   MRN: [id number]  PCP: Darian Dumas MD  Unit/Bed#: 2 [place] [address] Encounter: [id number]  Date Of Visit: 05/21/22    Problem List:    Principal Problem:    Acute on chronic respiratory failure with hypoxia and hypercapnia ([facility] )  Active Problems:    Acute on chronic diastolic CHF (congestive heart failure) ([facility] )    Diabetes mellitus, type 2 ([facility] )    Acute kidney injury on CKD stage 3    Atrial fibrillation with rapid ventricular response (HCC)    Obesity hypoventilation syndrome (HCC)    Asthma    Mixed hyperlipidemia    Essential hypertension    Status post reverse total replacement of left shoulder    Epistaxis    Lower leg edema    Anemia    Morbid obesity ([place] [address] )    Positive blood culture      Assessment & Plan:    Acute on chronic diastolic CHF (congestive heart failure) ([facility])  Assessment & Plan  Wt Readings from Last 3 Encounters:   05/21/22 113 kg (249 lb 12 5 oz)   05/13/22 116 kg (255 lb 8 2 oz)   04/28/22 121 kg (267 lb)     Presented with increasing shortness of breath, palpitation, edema  Chest x-ray with evidence of vascular congestion  Echocardiogram EF 50%  Volume status improving with diuresis  Cardiology input appreciated  · Continue diuresis with IV Lasix b i d   · Monitor intake output, daily weight  · Follow up Cardiology recommendations        * Acute on chronic respiratory failure with hypoxia and hypercapnia ([facility] )  Assessment & Plan  Patient presented with lethargy, reported increasing shortness of breath, chest discomfort  Initial VBG 7 09/127/55/38  Chest x-ray with evidence of vascular congestion, bilateral effusion  Repeat ABG on room air-7 49/47/65/93%  Likely secondary to CHF, obesity hypoventilation contributing  · Improved with diuresis and BiPAP  · Continue diuresis  · Supplemental oxygen, taper as tolerated  · Pulmonary follow-up regarding need for BiPAP on discharge for obesity hypoventilation    Obesity hypoventilation syndrome (HCC)  Assessment & Plan  Likely contributed to hypercapnia  Pulmonary following  ABG-7 37/62/92/96 on 3 5 L, repeat ABG on room air 7 49/47/65/93%  Follow-up bedside spirometry, pulse oximetry-reviewed with Pulmonary   Overnight pulse oximetry on room air noted with desaturation  Primary follow-up-pressure will likely qualify for BiPAP for discharge    Atrial fibrillation with rapid ventricular response ([address] )  Assessment & Plan  On presentation noted to be AFib with RVR, likely predisposing CHF decompensation  Recently seen by Cardiology for palpitation and was ordered a outpatient monitoring  Recent ER visit for the same  · Heart rate improved at present  · Continue metoprolol, Eliquis  · Follow up Cardiology recommendations    Acute kidney injury on CKD stage 3  Assessment & Plan  Baseline creatinine appears to be 1 4-1 6  Presented with creatinine of 2 1-recent Bactrim use  Improving with diuresis, remains at baseline  · Follow-up BMP, electrolytes  · Monitor intake output  · Monitor with diuresis, may require higher baseline to maintain euvolemia    Diabetes mellitus, type 2 [facility])  Assessment & Plan  Lab Results   Component Value Date    HGBA1C 6 8 (H) 03/09/2022       Recent Labs     05/20/22  2113 05/21/22  0712 05/21/22  1053 05/21/22  1626   POCGLU 166* 141* 167* 185*       Blood Sugar Average: Last 72 hrs:  (P) 183 6     Continue sliding scale  Continue Lantus 25 units q h s      Epistaxis  Assessment & Plan  Noted spontaneously on 05/19  · Improved with local measures, oxymetazoline  · Humidification  · Monitor    Status post reverse total replacement of left shoulder  Assessment & Plan  PT/OT    Essential hypertension  Assessment & Plan   continue Lopressor    Mixed hyperlipidemia  Assessment & Plan  On statin    Asthma  Assessment & Plan  Continue bronchodilators as needed    UTI (urinary tract infection)-resolved as of 5/19/2022  Assessment & Plan  Suspected during recent ER visit, urine culture with growth of mixed contaminants  Patient denies any dysuria  Discontinue ceftriaxone    Positive blood culture  Assessment & Plan  1/2 blood culture positive for coagulase-negative Staph  Likely contaminant  No further workup    Morbid obesity ([address] )  Assessment & Plan  With BMI of 47    Anemia  Assessment & Plan  Appears stable  Monitor    Lower leg edema  Assessment & Plan  Continue diuretics  Monitor intake output, daily weight    Acute encephalopathy-resolved as of 2022  Assessment & Plan  Secondary to hypercapnic respiratory failure  CT head without any acute abnormality  Now improved with BiPAP          VTE Pharmacologic Prophylaxis: VTE Score: 8 High Risk (Score >/= 5) - Pharmacological DVT Prophylaxis Ordered: apixaban (Eliquis)  Sequential Compression Devices Ordered  Patient Centered Rounds: I performed bedside rounds with nursing staff today  Discussions with Specialists or Other Care Team Provider:  Pulmonary, Cardiology    Education and Discussions with Family / Patient: Updated  (niece) via phone  Time Spent for Care: 45 minutes  More than 50% of total time spent on counseling and coordination of care as described above  Current Length of Stay: 5 day(s)  Current Patient Status: Inpatient   Certification Statement: The patient will continue to require additional inpatient hospital stay due to Respiratory failure  Discharge Plan: Anticipate discharge in 48-72 hrs to home with home services      Code Status: Level 1 - Full Code    Subjective:   Reports breathing is improving  Reports good urine output  Underwent overnight pulse oximetry last night, noted to have desaturation    Denies any pain  Denies any further epistaxis      Objective:     Vitals:   Temp (24hrs), Av 3 °F (36 8 °C), Min:97 9 °F (36 6 °C), Max:98 6 °F (37 °C)    Temp:  [97 9 °F (36 6 °C)-98 6 °F (37 °C)] 97 9 °F (36 6 °C)  HR:  [61-72] 61  Resp: [16-18] 16  BP: (139-155)/(56-61) 139/58  SpO2:  [93 %-100 %] 99 % on 2 L at rest  Body mass index is 45 69 kg/m²  Input and Output Summary (last 24 hours): Intake/Output Summary (Last 24 hours) at 5/21/2022 2044  Last data filed at 5/21/2022 8359  Gross per 24 hour   Intake --   Output 400 ml   Net -400 ml       Physical Exam:   Physical Exam  Constitutional:       General: She is not in acute distress  Appearance: She is obese  HENT:      Head: Normocephalic and atraumatic  Cardiovascular:      Rate and Rhythm: Normal rate  Pulmonary:      Effort: Pulmonary effort is normal  No respiratory distress  Breath sounds: Normal breath sounds  No wheezing or rales  Comments: Diminished bilaterally  Abdominal:      General: Bowel sounds are normal  There is no distension  Palpations: Abdomen is soft  Tenderness: There is no abdominal tenderness  There is no guarding or rebound  Musculoskeletal:      Comments: Lower extremity edema improving   Skin:     General: Skin is warm and dry  Findings: No rash  Neurological:      Mental Status: She is alert  Mental status is at baseline           Additional Data:     Labs:  Results from last 7 days   Lab Units 05/21/22  0502 05/20/22  0544   WBC Thousand/uL 6 30 6 14   HEMOGLOBIN g/dL 9 0* 9 0*   HEMATOCRIT % 30 4* 30 2*   PLATELETS Thousands/uL 159 155   NEUTROS PCT %  --  48   LYMPHS PCT %  --  30   MONOS PCT %  --  11   EOS PCT %  --  10*     Results from last 7 days   Lab Units 05/21/22  0502 05/18/22  0523 05/17/22  0548   SODIUM mmol/L 143   < > 144   POTASSIUM mmol/L 4 0   < > 5 0   CHLORIDE mmol/L 100   < > 103   CO2 mmol/L 38*   < > 37*   BUN mg/dL 38*   < > 46*   CREATININE mg/dL 1 54*   < > 1 97*   ANION GAP mmol/L 5   < > 4   CALCIUM mg/dL 8 7   < > 8 6   ALBUMIN g/dL  --   --  2 9*   TOTAL BILIRUBIN mg/dL  --   --  0 22   ALK PHOS U/L  --   --  59   ALT U/L  --   --  15   AST U/L  --   --  13   GLUCOSE RANDOM mg/dL 114   < > 123    < > = values in this interval not displayed  Results from last 7 days   Lab Units 05/21/22  0712 05/20/22  2113 05/20/22  1609 05/20/22  1106 05/20/22  0720 05/20/22  0029 05/19/22  2138 05/19/22  1559 05/19/22  1109 05/19/22  0731 05/18/22  2204 05/18/22  1640   POC GLUCOSE mg/dl 141* 166* 183* 183* 134 182* 211* 129 248* 182* 237* 142*         Results from last 7 days   Lab Units 05/19/22  0427 05/18/22  0523 05/17/22  0548 05/16/22  0901   PROCALCITONIN ng/ml 0 51* 0 73* 1 15* 0 07       Lines/Drains:  Invasive Devices  Report    Peripheral Intravenous Line  Duration           Peripheral IV 05/18/22 Left; Lower Arm 3 days                  Imaging: Reviewed radiology reports from this admission including: chest xray and CT head    Recent Cultures (last 7 days):   Results from last 7 days   Lab Units 05/16/22  1904 05/16/22  1215   BLOOD CULTURE   --  No Growth After 4 Days    Staphylococcus coagulase negative*   GRAM STAIN RESULT   --  Gram positive cocci in clusters*   LEGIONELLA URINARY ANTIGEN  Negative  --        Last 24 Hours Medication List:   Current Facility-Administered Medications   Medication Dose Route Frequency Provider Last Rate    acetaminophen  650 mg Oral Q6H PRN Ayad Walters MD      albuterol  2 puff Inhalation Q6H PRN Ayad Walters MD      apixaban  5 mg Oral BID Ayad Walters MD      docusate sodium  100 mg Oral Daily Ayad Walters MD      furosemide  40 mg Intravenous BID (diuretic) RK Butler      insulin glargine  25 Units Subcutaneous HS Ayad Walters MD      insulin lispro  2-12 Units Subcutaneous 4x Daily (AC & HS) Ayad Walters MD      ipratropium  0 5 mg Nebulization TID Ayad Walters MD      levalbuterol  1 25 mg Nebulization TID Ayad Walters MD      metoprolol tartrate  50 mg Oral Q12H [address] Zee Browne MD      nystatin   Topical BID Ayad Walters MD      pravastatin  80 mg Oral Daily With MD Viktor Boogie sodium chloride  1 spray Each Nare Q1H PRN Romayne Sickle, MD          Today, Patient Was Seen By: Romayne Sickle, MD    ** Please Note: "This note has been constructed using a voice recognition system  Therefore there may be syntax, spelling, and/or grammatical errors   Please call if you have any questions  "**

## 2022-05-22 PROBLEM — R78.81 POSITIVE BLOOD CULTURE: Status: RESOLVED | Noted: 2022-05-18 | Resolved: 2022-05-22

## 2022-05-22 PROBLEM — R04.0 EPISTAXIS: Status: RESOLVED | Noted: 2022-05-19 | Resolved: 2022-05-22

## 2022-05-22 LAB
ANION GAP SERPL CALCULATED.3IONS-SCNC: 5 MMOL/L (ref 4–13)
BUN SERPL-MCNC: 41 MG/DL (ref 5–25)
CALCIUM SERPL-MCNC: 8.7 MG/DL (ref 8.3–10.1)
CHLORIDE SERPL-SCNC: 100 MMOL/L (ref 100–108)
CO2 SERPL-SCNC: 37 MMOL/L (ref 21–32)
CREAT SERPL-MCNC: 1.69 MG/DL (ref 0.6–1.3)
ERYTHROCYTE [DISTWIDTH] IN BLOOD BY AUTOMATED COUNT: 14.4 % (ref 11.6–15.1)
FERRITIN SERPL-MCNC: 236 NG/ML (ref 8–388)
GFR SERPL CREATININE-BSD FRML MDRD: 30 ML/MIN/1.73SQ M
GLUCOSE SERPL-MCNC: 137 MG/DL (ref 65–140)
GLUCOSE SERPL-MCNC: 141 MG/DL (ref 65–140)
GLUCOSE SERPL-MCNC: 148 MG/DL (ref 65–140)
GLUCOSE SERPL-MCNC: 181 MG/DL (ref 65–140)
GLUCOSE SERPL-MCNC: 202 MG/DL (ref 65–140)
HCT VFR BLD AUTO: 31 % (ref 34.8–46.1)
HGB BLD-MCNC: 9.3 G/DL (ref 11.5–15.4)
IRON SATN MFR SERPL: 20 % (ref 15–50)
IRON SERPL-MCNC: 52 UG/DL (ref 50–170)
MCH RBC QN AUTO: 27.7 PG (ref 26.8–34.3)
MCHC RBC AUTO-ENTMCNC: 30 G/DL (ref 31.4–37.4)
MCV RBC AUTO: 92 FL (ref 82–98)
PLATELET # BLD AUTO: 143 THOUSANDS/UL (ref 149–390)
PMV BLD AUTO: 10.3 FL (ref 8.9–12.7)
POTASSIUM SERPL-SCNC: 3.9 MMOL/L (ref 3.5–5.3)
RBC # BLD AUTO: 3.36 MILLION/UL (ref 3.81–5.12)
SODIUM SERPL-SCNC: 142 MMOL/L (ref 136–145)
TIBC SERPL-MCNC: 261 UG/DL (ref 250–450)
VIT B12 SERPL-MCNC: 342 PG/ML (ref 100–900)
WBC # BLD AUTO: 5.94 THOUSAND/UL (ref 4.31–10.16)

## 2022-05-22 PROCEDURE — 82728 ASSAY OF FERRITIN: CPT | Performed by: INTERNAL MEDICINE

## 2022-05-22 PROCEDURE — 94760 N-INVAS EAR/PLS OXIMETRY 1: CPT

## 2022-05-22 PROCEDURE — 82948 REAGENT STRIP/BLOOD GLUCOSE: CPT

## 2022-05-22 PROCEDURE — 83550 IRON BINDING TEST: CPT | Performed by: INTERNAL MEDICINE

## 2022-05-22 PROCEDURE — 80048 BASIC METABOLIC PNL TOTAL CA: CPT | Performed by: INTERNAL MEDICINE

## 2022-05-22 PROCEDURE — 94640 AIRWAY INHALATION TREATMENT: CPT

## 2022-05-22 PROCEDURE — 85027 COMPLETE CBC AUTOMATED: CPT | Performed by: INTERNAL MEDICINE

## 2022-05-22 PROCEDURE — 99232 SBSQ HOSP IP/OBS MODERATE 35: CPT | Performed by: INTERNAL MEDICINE

## 2022-05-22 PROCEDURE — 94660 CPAP INITIATION&MGMT: CPT

## 2022-05-22 PROCEDURE — 82607 VITAMIN B-12: CPT | Performed by: INTERNAL MEDICINE

## 2022-05-22 PROCEDURE — 83540 ASSAY OF IRON: CPT | Performed by: INTERNAL MEDICINE

## 2022-05-22 RX ORDER — FUROSEMIDE 10 MG/ML
20 INJECTION INTRAMUSCULAR; INTRAVENOUS
Status: DISCONTINUED | OUTPATIENT
Start: 2022-05-22 | End: 2022-05-23

## 2022-05-22 RX ADMIN — APIXABAN 5 MG: 5 TABLET, FILM COATED ORAL at 17:00

## 2022-05-22 RX ADMIN — METOPROLOL TARTRATE 50 MG: 50 TABLET, FILM COATED ORAL at 08:53

## 2022-05-22 RX ADMIN — LEVALBUTEROL HYDROCHLORIDE 1.25 MG: 1.25 SOLUTION, CONCENTRATE RESPIRATORY (INHALATION) at 08:14

## 2022-05-22 RX ADMIN — IPRATROPIUM BROMIDE 0.5 MG: 0.5 SOLUTION RESPIRATORY (INHALATION) at 08:14

## 2022-05-22 RX ADMIN — DOCUSATE SODIUM 100 MG: 100 CAPSULE, LIQUID FILLED ORAL at 08:53

## 2022-05-22 RX ADMIN — INSULIN GLARGINE 25 UNITS: 100 INJECTION, SOLUTION SUBCUTANEOUS at 22:16

## 2022-05-22 RX ADMIN — IPRATROPIUM BROMIDE 0.5 MG: 0.5 SOLUTION RESPIRATORY (INHALATION) at 20:20

## 2022-05-22 RX ADMIN — LEVALBUTEROL HYDROCHLORIDE 1.25 MG: 1.25 SOLUTION, CONCENTRATE RESPIRATORY (INHALATION) at 20:20

## 2022-05-22 RX ADMIN — NYSTATIN: 100000 POWDER TOPICAL at 17:01

## 2022-05-22 RX ADMIN — NYSTATIN: 100000 POWDER TOPICAL at 08:53

## 2022-05-22 RX ADMIN — LEVALBUTEROL HYDROCHLORIDE 1.25 MG: 1.25 SOLUTION, CONCENTRATE RESPIRATORY (INHALATION) at 14:44

## 2022-05-22 RX ADMIN — INSULIN LISPRO 4 UNITS: 100 INJECTION, SOLUTION INTRAVENOUS; SUBCUTANEOUS at 22:17

## 2022-05-22 RX ADMIN — PRAVASTATIN SODIUM 80 MG: 80 TABLET ORAL at 16:56

## 2022-05-22 RX ADMIN — IPRATROPIUM BROMIDE 0.5 MG: 0.5 SOLUTION RESPIRATORY (INHALATION) at 14:44

## 2022-05-22 RX ADMIN — APIXABAN 5 MG: 5 TABLET, FILM COATED ORAL at 08:53

## 2022-05-22 RX ADMIN — FUROSEMIDE 20 MG: 10 INJECTION, SOLUTION INTRAMUSCULAR; INTRAVENOUS at 08:53

## 2022-05-22 RX ADMIN — METOPROLOL TARTRATE 50 MG: 50 TABLET, FILM COATED ORAL at 20:14

## 2022-05-22 RX ADMIN — FUROSEMIDE 20 MG: 10 INJECTION, SOLUTION INTRAMUSCULAR; INTRAVENOUS at 16:56

## 2022-05-22 RX ADMIN — INSULIN LISPRO 2 UNITS: 100 INJECTION, SOLUTION INTRAVENOUS; SUBCUTANEOUS at 12:01

## 2022-05-22 NOTE — ASSESSMENT & PLAN NOTE
Appears remains low but stable  Follow-up iron studies, iron 52, iron sat 20% ferritin 236, TIBC 261  B12, folate adequate  Prior immunofixation without any evidence of monoclonal gammopathy  Follow-up outpatient with PCP

## 2022-05-22 NOTE — PLAN OF CARE
Problem: RESPIRATORY - ADULT  Goal: Achieves optimal ventilation and oxygenation  Description: INTERVENTIONS:  - Assess for changes in respiratory status  - Assess for changes in mentation and behavior  - Position to facilitate oxygenation and minimize respiratory effort  - Oxygen administered by appropriate delivery if ordered  - Initiate smoking cessation education as indicated  - Encourage broncho-pulmonary hygiene including cough, deep breathe, Incentive Spirometry  - Assess the need for suctioning and aspirate as needed  - Assess and instruct to report SOB or any respiratory difficulty  - Respiratory Therapy support as indicated  Outcome: Progressing     Problem: MOBILITY - ADULT  Goal: Maintain or return to baseline ADL function  Description: INTERVENTIONS:  -  Assess patient's ability to carry out ADLs; assess patient's baseline for ADL function and identify physical deficits which impact ability to perform ADLs (bathing, care of mouth/teeth, toileting, grooming, dressing, etc )  - Assess/evaluate cause of self-care deficits   - Assess range of motion  - Assess patient's mobility; develop plan if impaired  - Assess patient's need for assistive devices and provide as appropriate  - Encourage maximum independence but intervene and supervise when necessary  - Involve family in performance of ADLs  - Assess for home care needs following discharge   - Consider OT consult to assist with ADL evaluation and planning for discharge  - Provide patient education as appropriate  Outcome: Progressing  Problem: Nutrition/Hydration-ADULT  Goal: Nutrient/Hydration intake appropriate for improving, restoring or maintaining nutritional needs  Description: Monitor and assess patient's nutrition/hydration status for malnutrition  Collaborate with interdisciplinary team and initiate plan and interventions as ordered  Monitor patient's weight and dietary intake as ordered or per policy   Utilize nutrition screening tool and intervene as necessary  Determine patient's food preferences and provide high-protein, high-caloric foods as appropriate       INTERVENTIONS:  - Monitor oral intake, urinary output, labs, and treatment plans  - Assess nutrition and hydration status and recommend course of action  - Evaluate amount of meals eaten  - Assist patient with eating if necessary   - Allow adequate time for meals  - Recommend/ encourage appropriate diets, oral nutritional supplements, and vitamin/mineral supplements  - Order, calculate, and assess calorie counts as needed  - Recommend, monitor, and adjust tube feedings and TPN/PPN based on assessed needs  - Assess need for intravenous fluids  - Provide specific nutrition/hydration education as appropriate  - Include patient/family/caregiver in decisions related to nutrition  Outcome: Progressing        Problem: Potential for Falls  Goal: Patient will remain free of falls  Description: INTERVENTIONS:  - Educate patient/family on patient safety including physical limitations  - Instruct patient to call for assistance with activity   - Consult OT/PT to assist with strengthening/mobility   - Keep Call bell within reach  - Keep bed low and locked with side rails adjusted as appropriate  - Keep care items and personal belongings within reach  - Initiate and maintain comfort rounds  - Make Fall Risk Sign visible to staff  - Offer Toileting every 2 Hours, in advance of need  - Initiate/Maintain bed / chair alarm  - Obtain necessary fall risk management equipment: walker   - Apply yellow socks and bracelet for high fall risk patients  - Consider moving patient to room near nurses station  Outcome: Progressing

## 2022-05-22 NOTE — RESPIRATORY THERAPY NOTE
Pt was placed on bipap using Nasal mask but complained she was opening her mouth  Suggested using full face mask and was agreable  Fitted with full face mask but she was only able to tolerate for less than 5 mins stating its making her Anxiuos  She requested to take mask off and use NC only for sleep tonight

## 2022-05-22 NOTE — PROGRESS NOTES
Progress Note - Cardiology   [facility]     Kaylin Patel 73 y o  female MRN: [id number]  : 1950  Unit/Bed#: [address] [place] [address] Encounter: [id number]    ASSESSMENT:   Acute on chronic diastolic heart failure due to volume overload   EF 53%       PAF, rate controlled and anticoagulated      Hypertension      Morbid obesity   KATRINA   Diabetes mellitus   Dyslipidemia   SEBASTIAN on CKD , BUN/CR 41/1 69              RECOMMENDATIONS:   Lasix decreased to 20 mg IV b i d  Transition to p o  Demadex tomorrow  Continue Eliquis, and statin   Metoprolol increased to 50 mg q 12 hours  Treatment for KATRINA if indicated    Monitor daily weight, I/O, and electrolytes/renal function              Subjective / Objective:     Review of Systems   Awake and alert  Sitting up in chair  Denies any dyspnea or chest pain at rest  Vitals: Blood pressure 141/61, pulse 65, temperature 97 8 °F (36 6 °C), resp  rate 20, height 5' 2" (1 575 m), weight 112 kg (247 lb 12 8 oz), SpO2 100 %, not currently breastfeeding  Vitals:    22 0556 22 0600   Weight: 113 kg (249 lb 12 5 oz) 112 kg (247 lb 12 8 oz)     Body mass index is 45 32 kg/m²  BP Readings from Last 3 Encounters:   22 141/61   22 146/65   22 126/62     Orthostatic Blood Pressures    Flowsheet Row Most Recent Value   Blood Pressure 141/61 filed at 2022 2316   Patient Position - Orthostatic VS Lying filed at 2022 1740        I/O        0701   0700  0701   0700  0701   0700    P  O  300      I V  (mL/kg) 10 (0 1)      Total Intake(mL/kg) 310 (2 7)      Urine (mL/kg/hr) 1900 (0 7) 200 (0 1)     Stool 0      Total Output 1900 200     Net -1590 -200            Unmeasured Stool Occurrence 1 x          Invasive Devices  Report    Peripheral Intravenous Line  Duration           Peripheral IV 22 Dorsal (posterior); Right Wrist <1 day                  Intake/Output Summary (Last 24 hours) at 2022 12 [facility] filed at 5/22/2022 0432  Gross per 24 hour   Intake --   Output 200 ml   Net -200 ml         Physical Exam:     Neurologic:  Alert & oriented x 3,  no focal deficits noted   Constitutional:  Obese  Eyes:  PERRL, conjunctiva normal   HENT:  Atraumatic, external ears normal, nose normal,   NECK: Normal range of motion, no tenderness, neck is supple ,  Respiratory:  Decreased breath sounds in the bases with few bibasal crepitations  Cardiovascular:  Irregularly irregular rhythm S1-S2 with a I/VI ejection systolic murmur   No pericardial rub or gallop audible  GI:  Soft, nondistended, audible bowel sounds, nontender, no hepatosplenomegaly appreciated  Musculoskeletal:  No tenderness, no deformities     Extremities:  Trace edema     Psychiatric:  Speech and behavior appropriate                 Medications/ Allergies:     Current Facility-Administered Medications   Medication Dose Route Frequency Provider Last Rate    acetaminophen  650 mg Oral Q6H PRN Aliza Lucero MD      albuterol  2 puff Inhalation Q6H PRN Aliza Lucero MD      apixaban  5 mg Oral BID Aliza Lucero MD      docusate sodium  100 mg Oral Daily Aliza Lucero MD      furosemide  20 mg Intravenous BID (diuretic) Joaquín Badillo MD      insulin glargine  25 Units Subcutaneous HS Aliza Lucero MD      insulin lispro  2-12 Units Subcutaneous 4x Daily (AC & HS) Aliza Lucero MD      ipratropium  0 5 mg Nebulization TID Aliza Lucero MD      levalbuterol  1 25 mg Nebulization TID Aliza uLcero MD      metoprolol tartrate  50 mg Oral Q12H [facility] Joaquín Badillo MD      nystatin   Topical BID Aliza Lucero MD      pravastatin  80 mg Oral Daily With Dalton Gardner MD      sodium chloride  1 spray Each Nare Q1H PRN Aliza Lucero MD       acetaminophen, 650 mg, Q6H PRN  albuterol, 2 puff, Q6H PRN  sodium chloride, 1 spray, Q1H PRN      Allergies   Allergen Reactions    Penicillins Hives    Moxifloxacin Other (See Comments)     unknown    Percocet [Oxycodone-Acetaminophen] Other (See Comments)     unknown    Zinc Acetate Other (See Comments)     unknown    Asa [Aspirin] GI Intolerance    Indocin [Indomethacin] Other (See Comments)     Made patient "loopy"    Other Other (See Comments)     unknown           Labs:   Troponins:      CBC with diff:  Results from last 7 days   Lab Units 05/22/22  0618 05/21/22  0502 05/20/22  0544 05/19/22  0427 05/18/22  0523 05/17/22  0548 05/16/22  0901   WBC Thousand/uL 5 94 6 30 6 14 6 85 6 77 8 26 16 86*   HEMOGLOBIN g/dL 9 3* 9 0* 9 0* 9 1* 8 9* 8 8* 10 5*   HEMATOCRIT % 31 0* 30 4* 30 2* 30 6* 30 5* 29 4* 37 2   MCV fL 92 94 94 94 95 94 99*   PLATELETS Thousands/uL 143* 159 155 184 169 181 243   MCH pg 27 7 27 7 28 0 28 0 27 7 28 2 27 9   MCHC g/dL 30 0* 29 6* 29 8* 29 7* 29 2* 29 9* 28 2*   RDW % 14 4 14 4 14 5 14 5 14 6 14 4 14 4   MPV fL 10 3 10 9 9 9 10 8 10 4 10 9 10 9   NRBC AUTO /100 WBCs  --   --  0 0 0 0 0       CMP:  Results from last 7 days   Lab Units 05/22/22  0618 05/21/22  0502 05/20/22  0544 05/19/22  0427 05/18/22  0523 05/17/22  0548 05/16/22  0901   SODIUM mmol/L 142 143 141 143 142 144 143   POTASSIUM mmol/L 3 9 4 0 4 4 4 5 4 9 5 0 5 2   CHLORIDE mmol/L 100 100 101 103 102 103 102   CO2 mmol/L 37* 38* 37* 38* 37* 37* 37*   ANION GAP mmol/L 5 5 3* 2* 3* 4 4   BUN mg/dL 41* 38* 38* 36* 42* 46* 41*   CREATININE mg/dL 1 69* 1 54* 1 58* 1 49* 1 74* 1 97* 2 13*   CALCIUM mg/dL 8 7 8 7 8 5 8 5 8 8 8 6 8 6   AST U/L  --   --   --   --   --  13 19   ALT U/L  --   --   --   --   --  15 18   ALK PHOS U/L  --   --   --   --   --  59 84   TOTAL PROTEIN g/dL  --   --   --   --   --  7 0 8 3*   ALBUMIN g/dL  --   --   --   --   --  2 9* 3 3*   TOTAL BILIRUBIN mg/dL  --   --   --   --   --  0 22 0 15*   EGFR ml/min/1 73sq m 30 34 33 35 29 25 23       Magnesium:  Results from last 7 days   Lab Units 05/19/22  0427 05/18/22  0523 05/17/22  0548   MAGNESIUM mg/dL 2 1 2 4 2 1     Coags:    TSH:    No components found for: TSH3  Lipid Profile:    Hgb A1c:    NT-proBNP: No results for input(s): NTBNP in the last 72 hours  Imaging & Testing   I have personally reviewed pertinent reports  XR chest 1 view portable    Result Date: 5/16/2022  Narrative: CHEST INDICATION:   shortness of breath, respiratory distress  COMPARISON:  5/13/2022 EXAM PERFORMED/VIEWS:  XR CHEST PORTABLE FINDINGS: Cardiomediastinal silhouette appears mildly enlarged with mild increased vascular congestion and increased bilateral pleural effusions  No pneumothorax  Left shoulder arthroplasty again noted  Impression: Mild cardiomegaly Mild increased vascular congestion Increased bilateral pleural effusions Workstation performed: [id number]     XR chest 1 view portable    Result Date: 5/13/2022  Narrative: CHEST INDICATION:   SOB  COMPARISON:  Chest radiograph April 1, 2022 EXAM PERFORMED/VIEWS:  XR CHEST PORTABLE FINDINGS: Cardiomediastinal silhouette appears unremarkable  New mild prominence of the interstitial markings  Small bilateral pleural effusions  No pneumothorax Left shoulder arthroplasty  Impression: Mild pulmonary vascular congestion with small bilateral pleural effusions  Workstation performed: [id number]     CT head without contrast    Result Date: 5/16/2022  Narrative: CT BRAIN - WITHOUT CONTRAST INDICATION:   Mental status change, unknown cause altered mental status  COMPARISON:  2/17/2022 TECHNIQUE:  CT examination of the brain was performed  In addition to axial images, sagittal and coronal 2D reformatted images were created and submitted for interpretation  Radiation dose length product (DLP) for this visit:  912 34 mGy-cm   This examination, like all CT scans performed in the [facility], was performed utilizing techniques to minimize radiation dose exposure, including the use of iterative  reconstruction and automated exposure control    IMAGE QUALITY: Diagnostic  FINDINGS: PARENCHYMA: Decreased attenuation is noted in periventricular and subcortical white matter demonstrating an appearance that is statistically most likely to represent mild microangiopathic change  No CT signs of acute infarction  No intracranial mass, mass effect or midline shift  No acute parenchymal hemorrhage  VENTRICLES AND EXTRA-AXIAL SPACES:  Normal for the patient's age  VISUALIZED ORBITS AND PARANASAL SINUSES:  There is a left mastoid effusion, not present previously  There is mild left sphenoid sinus and ethmoid air cell mucosal thickening  CALVARIUM AND EXTRACRANIAL SOFT TISSUES:  Normal      Impression: 1  No acute intracranial abnormality  2   New left mastoid effusion  Correlation for acute mastoiditis recommended  Workstation performed: [id number]            Cardiac testing:   Results for orders placed during the hospital encounter of 19    Echo complete with contrast if indicated    Narrative  [address]  [address] [facility]  [phone]    Transthoracic Echocardiogram  2D, M-mode, Doppler, and Color Doppler    Study date:  29-May-2019    Patient: Vic Perez  MR number: [id number]  Account number: [de-identified]  : 1950  Age: 79 years  Gender: Female  Status: Inpatient  Location: Bedside  Height: 62 in  Weight: 250 6 lb  BP: 133/ 62 mmHg    Indications: Tachycardia    Diagnoses: I11 9 - Hypertensive heart disease without heart failure    Sonographer:  QUETA Smith  Referring Physician:  Grant Ny MD  Group:  [facility]  Interpreting Physician:  James Whitmore DO    SUMMARY    LEFT VENTRICLE:  Systolic function was normal by visual assessment  Ejection fraction was estimated to be 55 %  There were no regional wall motion abnormalities  Wall thickness was mildly to moderately increased    Doppler parameters were consistent with abnormal left ventricular relaxation (grade 1 diastolic dysfunction)  MITRAL VALVE:  There was mild regurgitation  AORTIC VALVE:  There was no evidence for stenosis  There was no regurgitation  TRICUSPID VALVE:  There was mild regurgitation  Pulmonary artery systolic pressure was within the normal range  HISTORY: PRIOR HISTORY: Diabetes,Diabetes, HTN, Hyperlipidemia, A Fib  PROCEDURE: The procedure was performed at the bedside  This was a routine study  The transthoracic approach was used  The study included complete 2D imaging, M-mode, complete spectral Doppler, and color Doppler  The heart rate was 77 bpm,  at the start of the study  Images were obtained from the parasternal, apical, subcostal, and suprasternal notch acoustic windows  Echocardiographic views were limited due to restricted patient mobility, poor patient compliance, poor  acoustic window availability, decreased penetration, and lung interference  This was a technically difficult study  LEFT VENTRICLE: Size was normal  Systolic function was normal by visual assessment  Ejection fraction was estimated to be 55 %  There were no regional wall motion abnormalities  Wall thickness was mildly to moderately increased  DOPPLER:  Doppler parameters were consistent with abnormal left ventricular relaxation (grade 1 diastolic dysfunction)  RIGHT VENTRICLE: The size was normal  Systolic function was normal     LEFT ATRIUM: The atrium was mildly dilated  RIGHT ATRIUM: Size was normal     MITRAL VALVE: Valve structure was normal  There was normal leaflet separation  No echocardiographic evidence for prolapse  DOPPLER: The transmitral velocity was within the normal range  There was no evidence for stenosis  There was mild  regurgitation  AORTIC VALVE: The valve was trileaflet  Leaflets exhibited normal thickness, normal cuspal separation, and sclerosis  DOPPLER: Transaortic velocity was within the normal range  There was no evidence for stenosis   There was no  regurgitation  TRICUSPID VALVE: The valve structure was normal  There was normal leaflet separation  DOPPLER: The transtricuspid velocity was within the normal range  There was mild regurgitation  Pulmonary artery systolic pressure was within the normal  range  Estimated peak PA pressure was 32 mmHg  PULMONIC VALVE: Leaflets exhibited normal thickness, no calcification, and normal cuspal separation  DOPPLER: The transpulmonic velocity was within the normal range  There was no regurgitation  PERICARDIUM: There was no thickening  There was no pericardial effusion  AORTA: The root exhibited normal size  PULMONARY ARTERY: The size was normal  The morphology appeared normal     SYSTEM MEASUREMENT TABLES    2D mode  AoR Diam 2D: 3 6 cm  LA Diam (2D): 3 9 cm  LA/Ao (2D): 1 08  FS (2D Teich): 26 9 %  IVSd (2D): 1 29 cm  LVDEV: 75 1 cmï¾³  LVESV: 35 3 cmï¾³  LVIDd(2D): 4 12 cm  LVISd (2D): 3 01 cm  LVOT Area 2D: 3 14 cmï¾²  LVPWd (2D): 1 2 cm  SV (Teich): 39 8 cmï¾³    Apical four chamber  LVEF A4C: 51 %    Unspecified Scan Mode  HANNA Cont Eq (Peak Gutierrez): 2 58 cmï¾²  LVOT Diam : 2 cm  LVOT Vmax: 963 mm/s  LVOT Vmax; Mean: 963 mm/s  Peak Grad ; Mean: 4 mm[Hg]  MV Peak A Gutierrez: 893 mm/s  MV Peak E Gutierrze  Mean: 805 mm/s  MVA (PHT): 3 67 cmï¾²  PHT: 60 ms  Max P mm[Hg]  V Max: 2360 mm/s  Vmax: 2430 mm/s  RA Area: 12 8 cmï¾²  RA Volume: 27 6 cmï¾³  TAPSE: 2 cm    [employer] Accredited [facility]chocardi[facility]    Prepared and electronically signed by    Griffin Cisneros DO  Signed 29-May-2019 16:19:07            Dr Sonia Atkinson MD, [facility]      "This note has been constructed using a voice recognition system  Therefore there may be syntax, spelling, and/or grammatical errors   Please call if you have any questions  "

## 2022-05-22 NOTE — PROGRESS NOTES
[facility] Internal Medicine Progress Note  Patient: Lata Balnco 73 y o  female   MRN: [id number]  PCP: Basil Stanton MD  Unit/Bed#: [address] [place] [address] Encounter: [id number]  Date Of Visit: 05/22/22    Problem List:    Principal Problem:    Acute on chronic respiratory failure with hypoxia and hypercapnia ([facility])  Active Problems:    Acute on chronic diastolic CHF (congestive heart failure) ([facility] )    Diabetes mellitus, type 2 ([facility])    Acute kidney injury on CKD stage 3    Atrial fibrillation with rapid ventricular response ([facility])    Obesity hypoventilation syndrome ([facility])    Asthma    Mixed hyperlipidemia    Essential hypertension    Status post reverse total replacement of left shoulder    Lower leg edema    Anemia    Morbid obesity ([facility] )      Assessment & Plan:    Acute on chronic diastolic CHF (congestive heart failure) ([facility])  Assessment & Plan  Wt Readings from Last 3 Encounters:   05/22/22 112 kg (247 lb 12 8 oz)   05/13/22 116 kg (255 lb 8 2 oz)   04/28/22 121 kg (267 lb)     Presented with increasing shortness of breath, palpitation, edema  Chest x-ray with evidence of vascular congestion  Echocardiogram EF 50%  Volume status improving with diuresis  Cardiology input appreciated  · Patient was continued on diuresis with IV Lasix 40 mg b i d , now transitioned to 20 mg q 12 hours  · Monitor intake output, daily weight  · Follow up Cardiology recommendations        * Acute on chronic respiratory failure with hypoxia and hypercapnia ([facility] )  Assessment & Plan  Patient presented with lethargy, reported increasing shortness of breath, chest discomfort  Initial VBG 7 09/127/55/38  Chest x-ray with evidence of vascular congestion, bilateral effusion  Repeat ABG on room air-7 49/47/65/93%  Likely secondary to CHF, obesity hypoventilation contributing  · Improved with diuresis and BiPAP  · Continue diuresis  · Supplemental oxygen, taper as tolerated    Discontinue supplemental oxygen during the daytime  · Pulmonary follow-up regarding need for BiPAP on discharge for obesity hypoventilation appreciated, will refer to case management    Obesity hypoventilation syndrome ([facility])  Assessment & Plan  Likely contributed to hypercapnia  Pulmonary following  ABG-7 37/62/92/96 on 3 5 L, repeat ABG on room air 7 49/47/65/93%  Follow-up bedside spirometry, pulse oximetry-reviewed with Pulmonary   Overnight pulse oximetry on room air noted with desaturation  Pulmonary follow-up appreciated, patient qualifies for BiPAP for discharge  Will need BiPAP 12/06/30% on discharge  Will refer to case management    Atrial fibrillation with rapid ventricular response ([facility] )  Assessment & Plan  On presentation noted to be AFib with RVR, likely predisposing CHF decompensation  Recently seen by Cardiology for palpitation and was ordered a outpatient monitoring  Recent ER visit for the same  · Heart rate improved at present  · Continue metoprolol, Eliquis  · Follow up Cardiology recommendations    Acute kidney injury on CKD stage 3  Assessment & Plan  Baseline creatinine appears to be 1 4-1 6  Presented with creatinine of 2 1-recent Bactrim use  Improving with diuresis, remains close to baseline  · Follow-up BMP, electrolytes  · Monitor intake output  · Monitor with diuresis, may require higher baseline to maintain euvolemia    Diabetes mellitus, type 2 [facility])  Assessment & Plan  Lab Results   Component Value Date    HGBA1C 6 8 (H) 03/09/2022       Recent Labs     05/21/22  1053 05/21/22  1626 05/21/22  2121 05/22/22  0721   POCGLU 167* 185* 182* 137       Blood Sugar Average: Last 72 hrs:  (P) 173 [id number]     Continue sliding scale  Continue Lantus 25 units q h s      Status post reverse total replacement of left shoulder  Assessment & Plan  PT/OT    Essential hypertension  Assessment & Plan   continue Lopressor    Mixed hyperlipidemia  Assessment & Plan  On statin    Asthma  Assessment & Plan  Continue bronchodilators as needed    Epistaxis-resolved as of 5/22/2022  Assessment & Plan  Noted spontaneously on 05/19  · Improved with local measures, oxymetazoline  · Humidification  · Monitor    UTI (urinary tract infection)-resolved as of 5/19/2022  Assessment & Plan  Suspected during recent ER visit, urine culture with growth of mixed contaminants  Patient denies any dysuria  Discontinue ceftriaxone    Morbid obesity ([address] )  Assessment & Plan  With BMI of 47    Anemia  Assessment & Plan  Appears stable  Follow-up iron studies, B12  Prior immunofixation without any evidence of monoclonal gammopathy    Lower leg edema  Assessment & Plan  Continue diuretics  Monitor intake output, daily weight    Positive blood culture-resolved as of 5/22/2022  Assessment & Plan  1/2 blood culture positive for coagulase-negative Staph  Likely contaminant  No further workup    Acute encephalopathy-resolved as of 5/17/2022  Assessment & Plan  Secondary to hypercapnic respiratory failure  CT head without any acute abnormality  Now improved with BiPAP          VTE Pharmacologic Prophylaxis: VTE Score: 8 High Risk (Score >/= 5) - Pharmacological DVT Prophylaxis Ordered: apixaban (Eliquis)  Sequential Compression Devices Ordered  Patient Centered Rounds: I performed bedside rounds with nursing staff today  Discussions with Specialists or Other Care Team Provider:   Cardiology    Education and Discussions with Family / Patient: Attempted to update  (niece) via phone  Unable to contact  Time Spent for Care: 45 minutes  More than 50% of total time spent on counseling and coordination of care as described above  Current Length of Stay: 6 day(s)  Current Patient Status: Inpatient   Certification Statement: The patient will continue to require additional inpatient hospital stay due to Respiratory failure  Discharge Plan: Anticipate discharge in 48-72 hrs to home with home services      Code Status: Level 1 - Full Code    Subjective:   Breathing continues to improve  Denies any chest pain  No further epistaxis  Attempt to use BiPAP yesterday but could not tolerate face mask      Objective:     Vitals:   Temp (24hrs), Av 2 °F (36 8 °C), Min:97 8 °F (36 6 °C), Max:98 4 °F (36 9 °C)    Temp:  [97 8 °F (36 6 °C)-98 4 °F (36 9 °C)] 97 8 °F (36 6 °C)  HR:  [63-65] 65  Resp:  [16-20] 20  BP: (135-141)/(59-61) 141/61  SpO2:  [97 %-100 %] 100 % on 2 L at rest  Body mass index is 45 32 kg/m²  Input and Output Summary (last 24 hours): Intake/Output Summary (Last 24 hours) at 2022 1140  Last data filed at 2022 0432  Gross per 24 hour   Intake --   Output 200 ml   Net -200 ml       Physical Exam:   Physical Exam  Constitutional:       General: She is not in acute distress  Appearance: She is obese  HENT:      Head: Normocephalic and atraumatic  Cardiovascular:      Rate and Rhythm: Normal rate  Pulmonary:      Effort: Pulmonary effort is normal  No respiratory distress  Breath sounds: Normal breath sounds  No wheezing or rales  Comments: Diminished bilaterally  Abdominal:      General: Bowel sounds are normal  There is no distension  Palpations: Abdomen is soft  Tenderness: There is no abdominal tenderness  There is no guarding or rebound  Musculoskeletal:      Comments: Lower extremity edema is improving   Skin:     General: Skin is warm and dry  Findings: No rash  Neurological:      General: No focal deficit present  Mental Status: She is alert and oriented to person, place, and time  Mental status is at baseline           Additional Data:     Labs:  Results from last 7 days   Lab Units 22  0618 22  0502 22  0544   WBC Thousand/uL 5 94   < > 6 14   HEMOGLOBIN g/dL 9 3*   < > 9 0*   HEMATOCRIT % 31 0*   < > 30 2*   PLATELETS Thousands/uL 143*   < > 155   NEUTROS PCT %  --   --  48   LYMPHS PCT %  --   --  30   MONOS PCT %  --   --  11   EOS PCT %  --   --  10*    < > = values in this interval not displayed  Results from last 7 days   Lab Units 05/22/22  0618 05/18/22  0523 05/17/22  0548   SODIUM mmol/L 142   < > 144   POTASSIUM mmol/L 3 9   < > 5 0   CHLORIDE mmol/L 100   < > 103   CO2 mmol/L 37*   < > 37*   BUN mg/dL 41*   < > 46*   CREATININE mg/dL 1 69*   < > 1 97*   ANION GAP mmol/L 5   < > 4   CALCIUM mg/dL 8 7   < > 8 6   ALBUMIN g/dL  --   --  2 9*   TOTAL BILIRUBIN mg/dL  --   --  0 22   ALK PHOS U/L  --   --  59   ALT U/L  --   --  15   AST U/L  --   --  13   GLUCOSE RANDOM mg/dL 141*   < > 123    < > = values in this interval not displayed  Results from last 7 days   Lab Units 05/22/22  0721 05/21/22  2121 05/21/22  1626 05/21/22  1053 05/21/22  0712 05/20/22  2113 05/20/22  1609 05/20/22  1106 05/20/22  0720 05/20/22  0029 05/19/22  2138 05/19/22  1559   POC GLUCOSE mg/dl 137 182* 185* 167* 141* 166* 183* 183* 134 182* 211* 129         Results from last 7 days   Lab Units 05/19/22  0427 05/18/22  0523 05/17/22  0548 05/16/22  0901   PROCALCITONIN ng/ml 0 51* 0 73* 1 15* 0 07       Lines/Drains:  Invasive Devices  Report    Peripheral Intravenous Line  Duration           Peripheral IV 05/22/22 Dorsal (posterior); Right Wrist <1 day                  Imaging: Reviewed radiology reports from this admission including: chest xray and CT head    Recent Cultures (last 7 days):   Results from last 7 days   Lab Units 05/16/22  1904 05/16/22  1215   BLOOD CULTURE   --  No Growth After 5 Days    Staphylococcus coagulase negative*   GRAM STAIN RESULT   --  Gram positive cocci in clusters*   LEGIONELLA URINARY ANTIGEN  Negative  --        Last 24 Hours Medication List:   Current Facility-Administered Medications   Medication Dose Route Frequency Provider Last Rate    acetaminophen  650 mg Oral Q6H PRN Shantal Moses MD      albuterol  2 puff Inhalation Q6H PRN Shantal Moses MD      apixaban  5 mg Oral BID Shantal Moses MD      docusate sodium  100 mg Oral Daily Marline Steve Rawls MD      furosemide  20 mg Intravenous BID (diuretic) Marcela Brooks MD      insulin glargine  25 Units Subcutaneous HS Campos Morgan MD      insulin lispro  2-12 Units Subcutaneous 4x Daily (AC & HS) Campos Morgan MD      ipratropium  0 5 mg Nebulization TID Campos Morgan MD      levalbuterol  1 25 mg Nebulization TID Campos Morgan MD      metoprolol tartrate  50 mg Oral Q12H [address] Marcela Brooks MD      nystatin   Topical BID Campos Morgan MD      pravastatin  80 mg Oral Daily With Cele Jennings MD      sodium chloride  1 spray Each Tammy Morgan MD          Today, Patient Was Seen By: Campos Morgan MD    ** Please Note: "This note has been constructed using a voice recognition system  Therefore there may be syntax, spelling, and/or grammatical errors   Please call if you have any questions  "**

## 2022-05-23 VITALS
HEIGHT: 62 IN | DIASTOLIC BLOOD PRESSURE: 60 MMHG | BODY MASS INDEX: 45.52 KG/M2 | WEIGHT: 247.36 LBS | OXYGEN SATURATION: 95 % | HEART RATE: 80 BPM | RESPIRATION RATE: 20 BRPM | SYSTOLIC BLOOD PRESSURE: 117 MMHG | TEMPERATURE: 98.5 F

## 2022-05-23 LAB
ANION GAP SERPL CALCULATED.3IONS-SCNC: 6 MMOL/L (ref 4–13)
BUN SERPL-MCNC: 38 MG/DL (ref 5–25)
CALCIUM SERPL-MCNC: 8.4 MG/DL (ref 8.3–10.1)
CHLORIDE SERPL-SCNC: 99 MMOL/L (ref 100–108)
CO2 SERPL-SCNC: 36 MMOL/L (ref 21–32)
CREAT SERPL-MCNC: 1.6 MG/DL (ref 0.6–1.3)
DME PARACHUTE DELIVERY DATE EXPECTED: NORMAL
DME PARACHUTE DELIVERY DATE REQUESTED: NORMAL
DME PARACHUTE ITEM DESCRIPTION: NORMAL
DME PARACHUTE ORDER STATUS: NORMAL
DME PARACHUTE SUPPLIER NAME: NORMAL
DME PARACHUTE SUPPLIER PHONE: NORMAL
ERYTHROCYTE [DISTWIDTH] IN BLOOD BY AUTOMATED COUNT: 14.4 % (ref 11.6–15.1)
GFR SERPL CREATININE-BSD FRML MDRD: 32 ML/MIN/1.73SQ M
GLUCOSE SERPL-MCNC: 117 MG/DL (ref 65–140)
GLUCOSE SERPL-MCNC: 130 MG/DL (ref 65–140)
GLUCOSE SERPL-MCNC: 164 MG/DL (ref 65–140)
GLUCOSE SERPL-MCNC: 176 MG/DL (ref 65–140)
HCT VFR BLD AUTO: 29.9 % (ref 34.8–46.1)
HGB BLD-MCNC: 9.3 G/DL (ref 11.5–15.4)
MCH RBC QN AUTO: 27.4 PG (ref 26.8–34.3)
MCHC RBC AUTO-ENTMCNC: 31.1 G/DL (ref 31.4–37.4)
MCV RBC AUTO: 88 FL (ref 82–98)
PLATELET # BLD AUTO: 159 THOUSANDS/UL (ref 149–390)
PMV BLD AUTO: 10.4 FL (ref 8.9–12.7)
POTASSIUM SERPL-SCNC: 3.8 MMOL/L (ref 3.5–5.3)
RBC # BLD AUTO: 3.39 MILLION/UL (ref 3.81–5.12)
SODIUM SERPL-SCNC: 141 MMOL/L (ref 136–145)
WBC # BLD AUTO: 6.26 THOUSAND/UL (ref 4.31–10.16)

## 2022-05-23 PROCEDURE — 94640 AIRWAY INHALATION TREATMENT: CPT

## 2022-05-23 PROCEDURE — 82948 REAGENT STRIP/BLOOD GLUCOSE: CPT

## 2022-05-23 PROCEDURE — 94760 N-INVAS EAR/PLS OXIMETRY 1: CPT

## 2022-05-23 PROCEDURE — 85027 COMPLETE CBC AUTOMATED: CPT | Performed by: INTERNAL MEDICINE

## 2022-05-23 PROCEDURE — 99239 HOSP IP/OBS DSCHRG MGMT >30: CPT | Performed by: INTERNAL MEDICINE

## 2022-05-23 PROCEDURE — 80048 BASIC METABOLIC PNL TOTAL CA: CPT | Performed by: INTERNAL MEDICINE

## 2022-05-23 RX ORDER — PEN NEEDLE, DIABETIC 32GX 5/32"
NEEDLE, DISPOSABLE MISCELLANEOUS
Qty: 100 EACH | Refills: 0 | Status: SHIPPED | OUTPATIENT
Start: 2022-05-23

## 2022-05-23 RX ORDER — TORSEMIDE 20 MG/1
20 TABLET ORAL DAILY
Qty: 30 TABLET | Refills: 0 | Status: SHIPPED | OUTPATIENT
Start: 2022-05-23 | End: 2022-05-24 | Stop reason: SDUPTHER

## 2022-05-23 RX ORDER — METOPROLOL TARTRATE 50 MG/1
50 TABLET, FILM COATED ORAL EVERY 12 HOURS SCHEDULED
Qty: 60 TABLET | Refills: 0 | Status: SHIPPED | OUTPATIENT
Start: 2022-05-23 | End: 2022-05-24 | Stop reason: SDUPTHER

## 2022-05-23 RX ORDER — INSULIN LISPRO 100 [IU]/ML
INJECTION, SOLUTION INTRAVENOUS; SUBCUTANEOUS
Qty: 15 ML | Refills: 0 | Status: SHIPPED | OUTPATIENT
Start: 2022-05-23

## 2022-05-23 RX ORDER — TORSEMIDE 10 MG/1
20 TABLET ORAL DAILY
Qty: 60 TABLET | Refills: 0 | Status: CANCELLED | OUTPATIENT
Start: 2022-05-23 | End: 2022-06-22

## 2022-05-23 RX ORDER — INSULIN GLARGINE 300 U/ML
25 INJECTION, SOLUTION SUBCUTANEOUS
Qty: 4.5 ML | Refills: 0
Start: 2022-05-23 | End: 2022-07-29 | Stop reason: SDUPTHER

## 2022-05-23 RX ORDER — ECHINACEA PURPUREA EXTRACT 125 MG
1 TABLET ORAL
Refills: 0
Start: 2022-05-23

## 2022-05-23 RX ORDER — TORSEMIDE 20 MG/1
20 TABLET ORAL DAILY
Status: DISCONTINUED | OUTPATIENT
Start: 2022-05-24 | End: 2022-05-23 | Stop reason: HOSPADM

## 2022-05-23 RX ORDER — INSULIN LISPRO 100 [IU]/ML
INJECTION, SOLUTION INTRAVENOUS; SUBCUTANEOUS
Refills: 0
Start: 2022-05-23 | End: 2022-05-23

## 2022-05-23 RX ADMIN — LEVALBUTEROL HYDROCHLORIDE 1.25 MG: 1.25 SOLUTION, CONCENTRATE RESPIRATORY (INHALATION) at 14:11

## 2022-05-23 RX ADMIN — APIXABAN 5 MG: 5 TABLET, FILM COATED ORAL at 08:01

## 2022-05-23 RX ADMIN — INSULIN LISPRO 2 UNITS: 100 INJECTION, SOLUTION INTRAVENOUS; SUBCUTANEOUS at 16:31

## 2022-05-23 RX ADMIN — INSULIN LISPRO 2 UNITS: 100 INJECTION, SOLUTION INTRAVENOUS; SUBCUTANEOUS at 11:38

## 2022-05-23 RX ADMIN — APIXABAN 5 MG: 5 TABLET, FILM COATED ORAL at 17:01

## 2022-05-23 RX ADMIN — PRAVASTATIN SODIUM 80 MG: 80 TABLET ORAL at 16:38

## 2022-05-23 RX ADMIN — LEVALBUTEROL HYDROCHLORIDE 1.25 MG: 1.25 SOLUTION, CONCENTRATE RESPIRATORY (INHALATION) at 08:24

## 2022-05-23 RX ADMIN — FUROSEMIDE 20 MG: 10 INJECTION, SOLUTION INTRAMUSCULAR; INTRAVENOUS at 08:01

## 2022-05-23 RX ADMIN — IPRATROPIUM BROMIDE 0.5 MG: 0.5 SOLUTION RESPIRATORY (INHALATION) at 08:24

## 2022-05-23 RX ADMIN — IPRATROPIUM BROMIDE 0.5 MG: 0.5 SOLUTION RESPIRATORY (INHALATION) at 14:11

## 2022-05-23 RX ADMIN — METOPROLOL TARTRATE 50 MG: 50 TABLET, FILM COATED ORAL at 08:01

## 2022-05-23 RX ADMIN — NYSTATIN: 100000 POWDER TOPICAL at 08:05

## 2022-05-23 RX ADMIN — DOCUSATE SODIUM 100 MG: 100 CAPSULE, LIQUID FILLED ORAL at 08:01

## 2022-05-23 NOTE — PLAN OF CARE
Problem: RESPIRATORY - ADULT  Goal: Achieves optimal ventilation and oxygenation  Description: INTERVENTIONS:  - Assess for changes in respiratory status  - Assess for changes in mentation and behavior  - Position to facilitate oxygenation and minimize respiratory effort  - Oxygen administered by appropriate delivery if ordered  - Initiate smoking cessation education as indicated  - Encourage broncho-pulmonary hygiene including cough, deep breathe, Incentive Spirometry  - Assess the need for suctioning and aspirate as needed  - Assess and instruct to report SOB or any respiratory difficulty  - Respiratory Therapy support as indicated  Outcome: Progressing     Problem: MOBILITY - ADULT  Goal: Maintain or return to baseline ADL function  Description: INTERVENTIONS:  -  Assess patient's ability to carry out ADLs; assess patient's baseline for ADL function and identify physical deficits which impact ability to perform ADLs (bathing, care of mouth/teeth, toileting, grooming, dressing, etc )  - Assess/evaluate cause of self-care deficits   - Assess range of motion  - Assess patient's mobility; develop plan if impaired  - Assess patient's need for assistive devices and provide as appropriate  - Encourage maximum independence but intervene and supervise when necessary  - Involve family in performance of ADLs  - Assess for home care needs following discharge   - Consider OT consult to assist with ADL evaluation and planning for discharge  - Provide patient education as appropriate  Outcome: Progressing  Goal: Maintains/Returns to pre admission functional level  Description: INTERVENTIONS:  - Perform BMAT or MOVE assessment daily    - Set and communicate daily mobility goal to care team and patient/family/caregiver     - Collaborate with rehabilitation services on mobility goals if consulted  - Out of bed for toileting  - Record patient progress and toleration of activity level   Outcome: Progressing     Problem: Potential for Falls  Goal: Patient will remain free of falls  Description: INTERVENTIONS:  - Educate patient/family on patient safety including physical limitations  - Instruct patient to call for assistance with activity   - Consult OT/PT to assist with strengthening/mobility   - Keep Call bell within reach  - Keep bed low and locked with side rails adjusted as appropriate  - Keep care items and personal belongings within reach  - Initiate and maintain comfort rounds  - Make Fall Risk Sign visible to staff  - Offer Toileting every 2 Hours, in advance of need  - Initiate/Maintain bed alarm  - Obtain necessary fall risk management equipment:   - Apply yellow socks and bracelet for high fall risk patients  - Consider moving patient to room near nurses station  Outcome: Progressing     Problem: Prexisting or High Potential for Compromised Skin Integrity  Goal: Skin integrity is maintained or improved  Description: INTERVENTIONS:  - Identify patients at risk for skin breakdown  - Assess and monitor skin integrity  - Assess and monitor nutrition and hydration status  - Monitor labs   - Assess for incontinence   - Turn and reposition patient  - Assist with mobility/ambulation  - Relieve pressure over bony prominences  - Avoid friction and shearing  - Provide appropriate hygiene as needed including keeping skin clean and dry  - Evaluate need for skin moisturizer/barrier cream  - Collaborate with interdisciplinary team   - Patient/family teaching  - Consider wound care consult   Outcome: Progressing     Problem: Nutrition/Hydration-ADULT  Goal: Nutrient/Hydration intake appropriate for improving, restoring or maintaining nutritional needs  Description: Monitor and assess patient's nutrition/hydration status for malnutrition  Collaborate with interdisciplinary team and initiate plan and interventions as ordered  Monitor patient's weight and dietary intake as ordered or per policy   Utilize nutrition screening tool and intervene as necessary  Determine patient's food preferences and provide high-protein, high-caloric foods as appropriate       INTERVENTIONS:  - Monitor oral intake, urinary output, labs, and treatment plans  - Assess nutrition and hydration status and recommend course of action  - Evaluate amount of meals eaten  - Assist patient with eating if necessary   - Allow adequate time for meals  - Recommend/ encourage appropriate diets, oral nutritional supplements, and vitamin/mineral supplements  - Order, calculate, and assess calorie counts as needed  - Recommend, monitor, and adjust tube feedings and TPN/PPN based on assessed needs  - Assess need for intravenous fluids  - Provide specific nutrition/hydration education as appropriate  - Include patient/family/caregiver in decisions related to nutrition  Outcome: Progressing

## 2022-05-23 NOTE — CASE MANAGEMENT
Case Management Progress Note    Patient name Lata Blanco  Location [facility] [address] MRN [id number]  : 1950 Date 2022       LOS (days): 7  Geometric Mean LOS (GMLOS) (days): 3 80  Days to GMLOS:-3 3        OBJECTIVE:        Current admission status: Inpatient  Preferred Pharmacy:   [address] ([address] [facility] ([address] [address]) [address] [address] [place] [address] [phone])  [place] [address]  Phone: [phone] Fax: [phone]    Primary Care Provider: Basil Stanton MD    Primary Insurance: MEDICARE  Secondary Insurance: [employer]    PROGRESS NOTE:    AVS and F2f faxed to [facility]

## 2022-05-23 NOTE — DISCHARGE SUMMARY
Discharge Summary - [facility]    Patient Information: Humberto Driver 73 y o  female MRN: [id number]  Unit/Bed#: [address] [place] [address] Encounter: [id number]    Discharging Physician / Practitioner: Agustin Allan MD  PCP: Shant Medrano MD  Admission Date: 5/16/2022  Discharge Date: 05/23/22    Reason for Admission: Shortness of Breath, Chest Pain, and Altered Mental Status (Pt altered, lethargic, sob, was here [facility] for UTI)      Discharge Diagnoses:     Principal Problem:    Acute on chronic respiratory failure with hypoxia and hypercapnia (HCC)  Active Problems:    Acute on chronic diastolic CHF (congestive heart failure) ([facility] )    Diabetes mellitus, type 2 ([facility] )    Acute kidney injury on CKD stage 3    Atrial fibrillation with rapid ventricular response ([facility] )    Obesity hypoventilation syndrome (HCC)    Asthma    Mixed hyperlipidemia    Essential hypertension    Status post reverse total replacement of left shoulder    Lower leg edema    Anemia    Morbid obesity ([facility] )  Resolved Problems:    UTI (urinary tract infection)    Epistaxis    Acute encephalopathy    Positive blood culture        Acute on chronic diastolic CHF (congestive heart failure) ([facility])  Assessment & Plan  Wt Readings from Last 3 Encounters:   05/23/22 112 kg (247 lb 5 7 oz)   05/13/22 116 kg (255 lb 8 2 oz)   04/28/22 121 kg (267 lb)     Presented with increasing shortness of breath, palpitation, edema  Chest x-ray with evidence of vascular congestion  Echocardiogram EF 50%  Volume status improved significantly with diuresis,  Seen and followed by Cardiology, input appreciated  · Patient will be transition to torsemide 20 mg daily on discharge  · Continue metoprolol  · Patient was advised to continue to monitor daily weight as before  · BMP in 1 week  · Follow-up with Cardiology      * Acute on chronic respiratory failure with hypoxia and hypercapnia [facility])  Assessment & Plan  Patient presented with lethargy, reported increasing shortness of breath, chest discomfort  Initial VBG 7 09/127/55/38  Chest x-ray with evidence of vascular congestion, bilateral effusion  Repeat ABG on room air-7 49/47/65/93%  Likely secondary to CHF, obesity hypoventilation contributing  Currently saturating adequately on room air  · Improved with diuresis and BiPAP-does not require supplemental oxygen during daytime  · Continue diuresis, transitioning to torsemide 20 mg daily  · Pulmonary follow-up appreciated regarding need for BiPAP on discharge for obesity hypoventilation appreciated, patient was approved for BiPAP for obesity hypoventilation  Continue BiPAP on discharge management    Obesity hypoventilation syndrome (HCC)  Assessment & Plan  Likely contributed to hypercapnia  Pulmonary following  ABG-7 37/62/92/96 on 3 5 L, repeat ABG on room air 7 49/47/65/93%  Follow-up bedside spirometry, pulse oximetry-reviewed with Pulmonary   Overnight pulse oximetry on room air noted with desaturation  Pulmonary follow-up appreciated, patient qualifies for BiPAP for discharge  Will need BiPAP 12/06/30% on discharge  Case management input noted, BiPAP was arranged on discharge  Follow-up with Pulmonary as outpatient    Atrial fibrillation with rapid ventricular response ([facility] )  Assessment & Plan  On presentation noted to be AFib with RVR, likely predisposing CHF decompensation  Recently seen by Cardiology for palpitation and was ordered a outpatient monitoring    Recent ER visit for the same  · Heart rate improved at present  · Continue metoprolol, increase to 50 mg q 12  · Continue Eliquis  · Follow up Cardiology recommendations    Acute kidney injury on CKD stage 3  Assessment & Plan  Baseline creatinine appears to be 1 4-1 6  Presented with creatinine of 2 1-recent Bactrim use  Improved with diuresis, remains close to baseline  · Follow-up BMP, electrolytes  · Monitor intake output  · Monitor with diuresis, may require higher baseline to maintain euvolemia    Diabetes mellitus, type 2 [facility])  Assessment & Plan  Lab Results   Component Value Date    HGBA1C 6 8 (H) 03/09/2022       Recent Labs     05/22/22  1626 05/22/22  2106 05/23/22  0729 05/23/22  1104   POCGLU 148* 202* 130 176*       Blood Sugar Average: Last 72 hrs:  (P) 766 [id number]     Continue Lantus 25 units q h s  on discharge with sliding scale as she was doing before  Will recommend to monitor blood glucose      Status post reverse total replacement of left shoulder  Assessment & Plan  PT/OT    Essential hypertension  Assessment & Plan   continue Lopressor    Mixed hyperlipidemia  Assessment & Plan  On statin    Asthma  Assessment & Plan  Continue bronchodilators as needed    Epistaxis-resolved as of 5/22/2022  Assessment & Plan  Noted spontaneously on 05/19  · Improved with local measures, oxymetazoline  · Humidification  · Monitor    UTI (urinary tract infection)-resolved as of 5/19/2022  Assessment & Plan  Suspected during recent ER visit, urine culture with growth of mixed contaminants  Patient denies any dysuria  Discontinue ceftriaxone    Morbid obesity ([address] )  Assessment & Plan  With BMI of 47    Anemia  Assessment & Plan  Appears remains low but stable  Follow-up iron studies, iron 52, iron sat 20% ferritin 236, TIBC 261  B12, folate adequate  Prior immunofixation without any evidence of monoclonal gammopathy  Follow-up outpatient with PCP    Lower leg edema  Assessment & Plan  Continue diuretics  Monitor intake output, daily weight    Positive blood culture-resolved as of 5/22/2022  Assessment & Plan  1/2 blood culture positive for coagulase-negative Staph  Likely contaminant  No further workup    Acute encephalopathy-resolved as of 5/17/2022  Assessment & Plan  Secondary to hypercapnic respiratory failure  CT head without any acute abnormality  Now improved with BiPAP      Consultations During Hospital Stay:  IP CONSULT TO CASE MANAGEMENT  IP CONSULT TO CARDIOLOGY    Procedures Performed:     · Overnight pulse oximetry  · Spirometry    Significant Findings:     · Refer to hospital course and above listed diagnosis related plan for details    Imaging while in hospital:    XR chest 1 view portable    Result Date: 5/16/2022  Narrative: CHEST INDICATION:   shortness of breath, respiratory distress  COMPARISON:  5/13/2022 EXAM PERFORMED/VIEWS:  XR CHEST PORTABLE FINDINGS: Cardiomediastinal silhouette appears mildly enlarged with mild increased vascular congestion and increased bilateral pleural effusions  No pneumothorax  Left shoulder arthroplasty again noted  Impression: Mild cardiomegaly Mild increased vascular congestion Increased bilateral pleural effusions Workstation performed: [id number]     XR chest 1 view portable    Result Date: 5/13/2022  Narrative: CHEST INDICATION:   SOB  COMPARISON:  Chest radiograph April 1, 2022 EXAM PERFORMED/VIEWS:  XR CHEST PORTABLE FINDINGS: Cardiomediastinal silhouette appears unremarkable  New mild prominence of the interstitial markings  Small bilateral pleural effusions  No pneumothorax Left shoulder arthroplasty  Impression: Mild pulmonary vascular congestion with small bilateral pleural effusions  Workstation performed: [id number]     CT head without contrast    Result Date: 5/16/2022  Narrative: CT BRAIN - WITHOUT CONTRAST INDICATION:   Mental status change, unknown cause altered mental status  COMPARISON:  2/17/2022 TECHNIQUE:  CT examination of the brain was performed  In addition to axial images, sagittal and coronal 2D reformatted images were created and submitted for interpretation  Radiation dose length product (DLP) for this visit:  912 34 mGy-cm   This examination, like all CT scans performed in the [facility], was performed utilizing techniques to minimize radiation dose exposure, including the use of iterative  reconstruction and automated exposure control  IMAGE QUALITY:  Diagnostic   FINDINGS: PARENCHYMA: Decreased attenuation is noted in periventricular and subcortical white matter demonstrating an appearance that is statistically most likely to represent mild microangiopathic change  No CT signs of acute infarction  No intracranial mass, mass effect or midline shift  No acute parenchymal hemorrhage  VENTRICLES AND EXTRA-AXIAL SPACES:  Normal for the patient's age  VISUALIZED ORBITS AND PARANASAL SINUSES:  There is a left mastoid effusion, not present previously  There is mild left sphenoid sinus and ethmoid air cell mucosal thickening  CALVARIUM AND EXTRACRANIAL SOFT TISSUES:  Normal      Impression: 1  No acute intracranial abnormality  2   New left mastoid effusion  Correlation for acute mastoiditis recommended  Workstation performed: [id number]       Incidental Findings:   · Incidental mastoid effusion    Test Results Pending at Discharge (will require follow up):   · As per After Visit Summary     Outpatient Tests Requested:  · BMP as outpatient in 1 week    Complications:  Refer to hospital course and above listed diagnosis related plan, if any    Hospital Course: As per HPI  Charan Nolasco is a 73 y o  female patient with multiple comorbidities including atrial fibrillation on anticoagulation, morbid obesity, diabetes, diastolic CHF who originally presented to the hospital on 5/16/2022 due to shortness of breath and lethargy  As per the family patient was noted to be somnolent and lethargic  In ED patient was noted recently for shortness of breath and tachycardia  Patient was also on treatment for UTI as outpatient  Patient was placed on BiPAP due to lethargy  ABG revealed hypercapnia and hypoxia patient was subsequently admitted  Please see above list of diagnoses and related plan for additional information         Condition at Discharge: stable     Discharge Day Visit / Exam:     Subjective:    Feels well  Denies any chest pain or shortness of breath  No further epistaxis  Denies any headache dizziness  Denies any GI symptoms  Reports good urine output  Willing to use BiPAP at night    Weight is down trending  Vitals: Blood Pressure: 133/58 (05/23/22 0754)  Pulse: 59 (05/23/22 0754)  Temperature: 97 7 °F (36 5 °C) (05/23/22 0754)  Temp Source: Oral (05/23/22 0754)  Respirations: 20 (05/22/22 2329)  Height: 5' 2" (157 5 cm) (05/16/22 1353)  Weight - Scale: 112 kg (247 lb 5 7 oz) (05/23/22 0558)  SpO2: 92 % (05/23/22 0824) on room air  Exam:   Physical Exam  Constitutional:       General: She is not in acute distress  Appearance: She is obese  HENT:      Head: Normocephalic and atraumatic  Cardiovascular:      Rate and Rhythm: Normal rate  Pulmonary:      Effort: Pulmonary effort is normal  No respiratory distress  Breath sounds: Normal breath sounds  No wheezing or rales  Comments: Diminished, clear  Abdominal:      General: Bowel sounds are normal  There is no distension  Palpations: Abdomen is soft  Tenderness: There is no abdominal tenderness  There is no guarding or rebound  Musculoskeletal:      Comments: Improving lower extremity edema   Skin:     General: Skin is warm and dry  Findings: No rash  Neurological:      General: No focal deficit present  Mental Status: She is alert and oriented to person, place, and time  Mental status is at baseline  Discharge instructions/Information to patient and family:(Discharge Medications and Follow up):   See after visit summary for information provided to patient and family  Provisions for Follow-Up Care:  See after visit summary for information related to follow-up care and any pertinent home health orders  Disposition: Home    Planned Readmission:  No     Discharge Statement:  I spent 45 minutes discharging the patient  This time was spent on the day of discharge  I had direct contact with the patient on the day of discharge   Greater than 50% of the total time was spent examining patient, answering all patient questions, arranging and discussing plan of care with patient as well as directly providing post-discharge instructions  Additional time then spent on discharge activities  Coordinated with Pulmonary and Cardiology at time of discharge and follow-up  Discharge Medications:  See after visit summary for reconciled discharge medications provided to patient and family  ** Please Note: "This note has been constructed using a voice recognition system  Therefore there may be syntax, spelling, and/or grammatical errors   Please call if you have any questions  "**

## 2022-05-23 NOTE — NURSING NOTE
Patient left 2S in stable condition with all belongings at side  Bi-pap will be delivered at 2030 to patient's residence

## 2022-05-23 NOTE — CASE MANAGEMENT
Case Management Discharge Planning Note    Patient name Pablo Ramos  Location [facility] [address] MRN [id number]  : 1950 Date 2022       Current Admission Date: 2022  Current Admission Diagnosis:Acute on chronic respiratory failure with hypoxia and hypercapnia [facility])   Patient Active Problem List    Diagnosis Date Noted    Obesity hypoventilation syndrome ([email or web address] ) 2022    Acute on chronic respiratory failure with hypoxia and hypercapnia ([place] [address] ) 2022    Morbid obesity ([place] [address] ) 2022    Pure hypercholesterolemia 2022    Lump of left breast 2022    Pressure injury of deep tissue of sacral region 2022    Pulmonary nodules 2022    Acute on chronic diastolic CHF (congestive heart failure) ([place] [address] ) 2022    Atrial fibrillation with rapid ventricular response ([place] [address] ) 2022    Peripheral venous insufficiency 2022    Post-herpetic polyneuropathy 2022    Raynaud's disease 2022    Lung nodule < 6cm on CT 2022    Acute kidney injury on CKD stage 3     Chronic diastolic congestive heart failure ([place] [address] ) 03/10/2022    Status post reverse total replacement of left shoulder 2022    Closed 4-part fracture of proximal humerus, left, initial encounter 2022    PONV (postoperative nausea and vomiting) 2022    SEBASTIAN (acute kidney injury) ([place] [address] ) 2022    Diabetes mellitus, type 2 ([place] [address] ) 2022    Gastroesophageal reflux disease 2022    Nephrolithiasis 2022    Arthritis 2022    S/P total knee replacement, right 2022    On continuous oral anticoagulation - eliquis 2022    Humeral head fracture 2022    Essential hypertension 2019    Anemia 2019    Mixed hyperlipidemia 2019    Paroxysmal atrial fibrillation (HCC) 2019    Lower leg edema 2019    Asthma 2018    Morbid obesity with BMI of 45 0-49 9, adult ([email or web address] ) 2018      LOS (days): 7  Geometric Mean LOS (GMLOS) (days): 3 80  Days to GMLOS:-3 2     OBJECTIVE:  Risk of Unplanned Readmission Score: 36 24         Current admission status: Inpatient   Preferred Pharmacy:   [address] ([address] [facility] ([facility])  [address] [address] [place] [address] [phone])  [place] [address]  Phone: [phone] Fax: [phone]    Primary Care Provider:  Darian Dumas MD    Primary Insurance: MEDICARE  Secondary Insurance: [employer]    DISCHARGE DETAILS:     CM contacted family/caregiver?: Yes  Were Treatment Team discharge recommendations reviewed with patient/caregiver?: Yes  Did patient/caregiver verbalize understanding of patient care needs?: Yes  Were patient/caregiver advised of the risks associated with not following Treatment Team discharge recommendations?: Yes    Contacts  Patient Contacts: Marco Jean Baptiste  Relationship to Patient[de-identified] Family  Contact Method: Phone  Phone Number: [phone]  Reason/Outcome: Discharge Planning, Emergency Contact, Continuity of [address] [place] [address]         Is the patient interested in [facility]ie[facility] [place] at discharge?: Yes  V[address] Queta Holley requested[de-identified] Nursing, Occupational Therapy, Physical [address] Name[de-identified] [address] Provider[de-identified] PCP  Home Health Services Needed[de-identified] Strengthening/Theraputic Exercises to Improve Function, Gait/ADL Training, Evaluate Functional Status and Safety  Homebound Criteria Met[de-identified] Requires the Assistance of Another Person for Safe Ambulation or to Leave the Home  Supporting Clincal Findings[de-identified] Limited Endurance    DME Referral Provided  Referral made for DME?: Yes  DME referral completed for the following items[de-identified] BiPAP  DME Supplier Name[de-identified] [employer]    Treatment Team Recommendation: Home with [employer]  Discharge Destination Plan[de-identified] Home with [employer]   IMM Given (Date):: 05/23/22 (reviewed with nicole, agreeable to plan, imm placed in scan bin)  IMM Given to[de-identified] Family  Family notified[de-identified] kaila gonzalessuzanna Jones will be able to transport pt home

## 2022-05-23 NOTE — INCIDENTAL FINDINGS
The following findings require follow up:  Radiographic finding   Finding:  CT head incidentally noted to have  New left mastoid effusion      Follow up required:  As needed     Please notify the following clinician to assist with the follow up:   MD Zita Nettles

## 2022-05-23 NOTE — PROGRESS NOTES
Pastoral Care Progress Note    2022  Patient: Lata Blanco : 1950  Admission Date & Time: 2022 0846  MRN: [id number] CSN: [id number]                     Chaplaincy Interventions Utilized:   Relationship Building: Cultivated a relationship of care and support  Patient said that she was feeling well and getting ready to go home today  She has had several recent admissions  She asked for us to pray for some sustained health so she does not need to come back for a while  Patient was grateful for the spiritual care visit    Ritual: Provided prayer and Read sacred text   22 1400   Clinical Encounter Type   Visited With Patient   Routine Visit Introduction   Referral From   (census/rounds)   Referral To    Gnosticism Encounters   Gnosticism Needs Prayer

## 2022-05-24 ENCOUNTER — OFFICE VISIT (OUTPATIENT)
Dept: CARDIOLOGY CLINIC | Facility: CLINIC | Age: 72
End: 2022-05-24
Payer: MEDICARE

## 2022-05-24 ENCOUNTER — TRANSITIONAL CARE MANAGEMENT (OUTPATIENT)
Dept: INTERNAL MEDICINE CLINIC | Facility: CLINIC | Age: 72
End: 2022-05-24

## 2022-05-24 VITALS
OXYGEN SATURATION: 99 % | SYSTOLIC BLOOD PRESSURE: 122 MMHG | BODY MASS INDEX: 45.82 KG/M2 | HEART RATE: 65 BPM | DIASTOLIC BLOOD PRESSURE: 60 MMHG | WEIGHT: 249 LBS | HEIGHT: 62 IN | TEMPERATURE: 98.2 F

## 2022-05-24 DIAGNOSIS — I10 ESSENTIAL HYPERTENSION: ICD-10-CM

## 2022-05-24 DIAGNOSIS — I50.9 CHF (CONGESTIVE HEART FAILURE) (HCC): ICD-10-CM

## 2022-05-24 DIAGNOSIS — G47.33 OSA TREATED WITH BIPAP: ICD-10-CM

## 2022-05-24 DIAGNOSIS — I50.31 ACUTE DIASTOLIC CONGESTIVE HEART FAILURE (HCC): ICD-10-CM

## 2022-05-24 DIAGNOSIS — I50.32 CHRONIC DIASTOLIC CONGESTIVE HEART FAILURE (HCC): ICD-10-CM

## 2022-05-24 DIAGNOSIS — E11.9 DIABETES MELLITUS TYPE 2 IN NONOBESE (HCC): ICD-10-CM

## 2022-05-24 DIAGNOSIS — I48.0 PAROXYSMAL ATRIAL FIBRILLATION (HCC): Primary | ICD-10-CM

## 2022-05-24 PROCEDURE — 93000 ELECTROCARDIOGRAM COMPLETE: CPT | Performed by: INTERNAL MEDICINE

## 2022-05-24 PROCEDURE — 99214 OFFICE O/P EST MOD 30 MIN: CPT | Performed by: INTERNAL MEDICINE

## 2022-05-24 RX ORDER — METOPROLOL TARTRATE 50 MG/1
50 TABLET, FILM COATED ORAL EVERY 12 HOURS SCHEDULED
Qty: 180 TABLET | Refills: 1 | Status: SHIPPED | OUTPATIENT
Start: 2022-05-24 | End: 2022-07-06 | Stop reason: ALTCHOICE

## 2022-05-24 RX ORDER — TORSEMIDE 20 MG/1
20 TABLET ORAL EVERY MORNING
Qty: 90 TABLET | Refills: 1 | Status: SHIPPED | OUTPATIENT
Start: 2022-05-24 | End: 2022-07-06 | Stop reason: SDUPTHER

## 2022-05-24 NOTE — PROGRESS NOTES
Cardiology Follow Up  Juan Diego Ellis  1950  [id number]  [facility]  [id number] [place] [address]  [place] [id number] [phone]    1  Paroxysmal atrial fibrillation (HCC)  POCT ECG    metoprolol tartrate (LOPRESSOR) 50 mg tablet    apixaban (ELIQUIS) 5 mg   2  Essential hypertension  POCT ECG   3  Chronic diastolic congestive heart failure (HCC)  POCT ECG   4  Acute diastolic congestive heart failure (HCC)  torsemide (DEMADEX) 20 mg tablet   5  CHF (congestive heart failure) (HCC)  metoprolol tartrate (LOPRESSOR) 50 mg tablet    apixaban (ELIQUIS) 5 mg   6  Diabetes mellitus type 2 in nonobese (HCC)  metoprolol tartrate (LOPRESSOR) 50 mg tablet    apixaban (ELIQUIS) 5 mg   7  KATRINA treated with BiPAP  Ambulatory Referral to Sleep Medicine      Discussion/Plan:  SVT paroxysmal/AFib- recent hospitalization with acute exacerbation  We need her compliant with her BiPAP at night  She reports having claustrophobia  She will follow-up with sleep medicine  She needs at least oxygen at night  She needs to sleep on an incline  She needs to tried sleep on her sides  Acute on chronic diastolic hf in the setting of chronic kidney disease-will continue her on her torsemide therapy  Her blood pressures are controlled  Continue metoprolol  Diabetes mellitus with insulin dependence recent up titration of her insulin by her primary    Chronic kidney disease- followed by Nephrology  May need to accept a high GFR with diuresis  Lymphedema- compression sock knee high      Interval History:  79year-old with a history of SVT, abnormal sleep screen with recent hospital admission secondary to acute infection noted to have periodic atrial tachycardia  She reports having continued symptoms at night  She is pending a sleep study  She is currently on metoprolol 100 mg twice a day    She also taking eliquis 5mg twice a day   Her lower extremity edema is better    02/24/2022:  She has had an unfortunate mechanical fall  She denies having chest heaviness  She denies feeling dizziness or lightheadedness  She has chronic lower extremity swelling  04/20/2022:  Recent hospitalization post surgery with decompensated heart failure  She was noted to have elevated kidney function  She was noted to have continued lower extremity swelling  Her torsemide was up titrated  She still has some lower extremity swelling  She feels palpitations daily  She states having palpitations light activities  Here she is currently in sinus rhythm at 80 beats per minute  05/24/2022:  Recent hospitalization with AFib with RVR and elevated swelling  She was diuresed  She was placed on CPAP  She has not been using her CPAP secondary to claustrophobia  She is compliant with her diuretic  She is watching her weight  She is watching low-sodium diet      Patient Active Problem List   Diagnosis    Asthma    Morbid obesity with BMI of 45 0-49 9, adult (HCC)    Lower leg edema    Paroxysmal atrial fibrillation (HCC)    Mixed hyperlipidemia    Anemia    Essential hypertension    Humeral head fracture    PONV (postoperative nausea and vomiting)    SEBASTIAN (acute kidney injury) ([facility] )    Diabetes mellitus, type 2 (HCC)    Gastroesophageal reflux disease    Nephrolithiasis    Arthritis    S/P total knee replacement, right    On continuous oral anticoagulation - eliquis    Closed 4-part fracture of proximal humerus, left, initial encounter    Status post reverse total replacement of left shoulder    Chronic diastolic congestive heart failure (HCC)    Acute kidney injury on CKD stage 3    Peripheral venous insufficiency    Post-herpetic polyneuropathy    Raynaud's disease    Lung nodule < 6cm on CT    Atrial fibrillation with rapid ventricular response (HCC)    Acute on chronic diastolic CHF (congestive heart failure) ([facility] )    Pulmonary nodules    Lump of left breast    Pressure injury of deep tissue of sacral region    Morbid obesity (HCC)    Pure hypercholesterolemia    Acute on chronic respiratory failure with hypoxia and hypercapnia (HCC)    Obesity hypoventilation syndrome (HCC)     Past Medical History:   Diagnosis Date    Anemia     Asthma     COVID-19     2022    GERD (gastroesophageal reflux disease)     Hyperlipidemia     Kidney stones     PONV (postoperative nausea and vomiting)     SVT (supraventricular tachycardia) (HCC)      Social History     Socioeconomic History    Marital status: Single     Spouse name: Not on file    Number of children: Not on file    Years of education: Not on file    Highest education level: Not on file   Occupational History    Not on file   Tobacco Use    Smoking status: Former Smoker     Packs/day:      Years:      Pack years:      Types: Cigarettes     Quit date: 1996     Years since quittin 8    Smokeless tobacco: Never Used   Vaping Use    Vaping Use: Never used   Substance and Sexual Activity    Alcohol use: Not Currently     Comment: rarely    Drug use: No    Sexual activity: Not Currently   Other Topics Concern    Not on file   Social History Narrative    Not on file     Social Determinants of Health     Financial Resource Strain: Not on file   Food Insecurity: No Food Insecurity    Worried About Running Out of Food in the Last Year: Never true    Wes of Food in the Last Year: Never true   Transportation Needs: No Transportation Needs    Lack of Transportation (Medical): No    Lack of Transportation (Non-Medical):  No   Physical Activity: Not on file   Stress: Not on file   Social Connections: Not on file   Intimate Partner Violence: Not on file   Housing Stability: Low Risk     Unable to Pay for Housing in the Last Year: No    Number of Places Lived in the Last Year: 2    Unstable Housing in the Last Year: No      Family History   Problem Relation Age of Onset    Heart disease Mother     Emphysema Maternal Grandmother     Heart disease Family      Past Surgical History:   Procedure Laterality Date    APPENDECTOMY      CYSTOSCOPY W/ LASER LITHOTRIPSY Left 7/12/2016    Procedure: CYSTOSCOPY URETEROSCOPY WITH LITHOTRIPSY HOLMIUM LASER, RETROGRADE PYELOGRAM AND INSERTION STENT URETERAL;  Surgeon: Ashish Alexis MD;  Location: [address] [place] [address];  Service:     DILATION AND CURETTAGE OF UTERUS      IR THORACENTESIS  3/18/2022    JOINT REPLACEMENT      right knee    KNEE ARTHROPLASTY Right     VT CYSTOURETHROSCOPY,URETER CATHETER Left 6/29/2016    Procedure: CYSTOSCOPY RETROGRADE PYELOGRAM WITH INSERTION STENT URETERAL, left;  Surgeon: Ashish Alexis MD;  Location: [address] [place] [address];  Service: Urology    VT RECONSTR TOTAL SHOULDER IMPLANT Left 3/1/2022    Procedure: ARTHROPLASTY SHOULDER REVERSE;  Surgeon: Morales Nails MD;  Location: [facility];  Service: Orthopedics    SHOULDER ARTHROTOMY Left        Current Outpatient Medications:     acetaminophen (TYLENOL) 325 mg tablet, Take 650 mg by mouth every 6 (six) hours as needed for mild pain  , Disp: , Rfl:     albuterol (PROVENTIL HFA,VENTOLIN HFA) 90 mcg/act inhaler, Inhale 2 puffs every 6 (six) hours as needed for wheezing, Disp: 8 g, Rfl: 1    ammonium lactate (LAC-HYDRIN) 12 % lotion, APPLY TOPICALLY TO AFFECTED AREAS twice a day, Disp: , Rfl:     apixaban (ELIQUIS) 5 mg, Take 1 tablet (5 mg total) by mouth in the morning and 1 tablet (5 mg total) in the evening , Disp: 180 tablet, Rfl: 1    b complex-vitamin C-folic acid (NEPHROCAPS) 1 mg capsule, Take 1 capsule by mouth daily, Disp: 30 capsule, Rfl: 5    docusate sodium (COLACE) 100 mg capsule, Take 100 mg by mouth in the morning, Disp: , Rfl:     glucose blood (OneTouch Ultra) test strip, Test twice daily  , Disp: 100 strip, Rfl: 2    insulin lispro (HumaLOG KwikPen) 100 units/mL injection pen, 3 times with meal according to sliding scale - >Blood Glucose 150 - 199: 2 Unit of Insulin, Blood Glucose 200 - 249: 4 Units of Insulin, Blood Glucose 250 - 299: 6 Units of Insulin, Blood Glucose 300 - 349: 8 Units of Insulin, Blood Glucose 350 - 399: 10 Units of Inuslin,Blood Glucose greater than or equal to 400: 12 Units of Insulin-please contact your physician, Disp: 15 mL, Rfl: 0    Insulin Pen Needle (BD Pen Needle Pilar 2nd Gen) 32G X 4 MM MISC, For use with insulin pen  Pharmacy may dispense brand covered by insurance , Disp: 100 each, Rfl: 0    Insulin Pen Needle (NovoFine Autocover) 30G X 8 MM MISC, Inject under the skin daily, Disp: 100 each, Rfl: 3    Lancets (onetouch ultrasoft) lancets, Use once daily, Disp: 100 each, Rfl: 2    metoprolol tartrate (LOPRESSOR) 50 mg tablet, Take 1 tablet (50 mg total) by mouth every 12 (twelve) hours, Disp: 180 tablet, Rfl: 1    montelukast (SINGULAIR) 10 mg tablet, Take 10 mg by mouth daily  , Disp: , Rfl:     nystatin (MYCOSTATIN) powder, Apply topically 2 (two) times a day, Disp: , Rfl:     pregabalin (LYRICA) 100 mg capsule, Take 100 mg by mouth 2 (two) times a day , Disp: , Rfl:     rosuvastatin (CRESTOR) 10 MG tablet, 10 mg daily  , Disp: , Rfl: 0    sodium chloride (OCEAN) 0 65 % nasal spray, 1 spray into each nostril every hour as needed for congestion, Disp: , Rfl: 0    torsemide (DEMADEX) 20 mg tablet, Take 1 tablet (20 mg total) by mouth every morning, Disp: 90 tablet, Rfl: 1    Toujeo SoloStar 300 units/mL CONCENTRATED U-300 injection pen (1-unit dial), Inject 25 Units under the skin daily at bedtime, Disp: 4 5 mL, Rfl: 0    Trulicity 1 5 ND/0 2KH SOPN, inject 0 5 milliliter subcutaneously every week 28, Disp: , Rfl:     lidocaine (LIDODERM) 5 %, Apply 2 patches topically daily Remove & Discard patch within 12 hours or as directed by MD, Disp: , Rfl: 0  No current facility-administered medications for this visit    Allergies   Allergen Reactions    Penicillins Hives    Moxifloxacin Other (See Comments)     unknown    Percocet [Oxycodone-Acetaminophen] Other (See Comments)     unknown    Zinc Acetate Other (See Comments)     unknown    Asa [Aspirin] GI Intolerance    Indocin [Indomethacin] Other (See Comments)     Made patient "loopy"    Other Other (See Comments)     unknown       Review of Systems:  Review of Systems   Constitutional: Negative  Negative for activity change, appetite change, chills, diaphoresis, fatigue, fever and unexpected weight change  HENT: Negative  Negative for congestion, dental problem, drooling, ear discharge, ear pain, facial swelling, hearing loss, mouth sores, nosebleeds, postnasal drip, rhinorrhea, sinus pressure, sinus pain, sneezing, sore throat, tinnitus, trouble swallowing and voice change  Eyes: Negative  Negative for photophobia, pain, redness, itching and visual disturbance  Respiratory: Positive for shortness of breath  Negative for apnea, cough, choking, chest tightness, wheezing and stridor  Cardiovascular: Positive for leg swelling  Negative for chest pain and palpitations  Gastrointestinal: Negative  Negative for abdominal distention, abdominal pain, anal bleeding, blood in stool, constipation, diarrhea, nausea, rectal pain and vomiting  Endocrine: Negative  Negative for cold intolerance, heat intolerance, polydipsia, polyphagia and polyuria  Genitourinary: Negative  Negative for decreased urine volume, difficulty urinating, dyspareunia, dysuria, enuresis, flank pain, frequency, genital sores, hematuria, menstrual problem, pelvic pain, urgency, vaginal bleeding, vaginal discharge and vaginal pain  Musculoskeletal: Negative  Negative for arthralgias, back pain, gait problem, joint swelling, myalgias, neck pain and neck stiffness  Skin: Negative  Negative for color change, pallor, rash and wound  Allergic/Immunologic: Negative    Negative for environmental allergies, food allergies and immunocompromised state  Neurological: Negative  Negative for dizziness, tremors, seizures, syncope, facial asymmetry, speech difficulty, weakness, light-headedness, numbness and headaches  Hematological: Negative  Negative for adenopathy  Does not bruise/bleed easily  Psychiatric/Behavioral: Negative  Negative for agitation, behavioral problems, confusion, decreased concentration, dysphoric mood, hallucinations, self-injury, sleep disturbance and suicidal ideas  The patient is not nervous/anxious and is not hyperactive  All other systems reviewed and are negative  Vitals:    05/24/22 1619   BP: 122/60   BP Location: Left arm   Patient Position: Sitting   Cuff Size: Large   Pulse: 65   Temp: 98 2 °F (36 8 °C)   SpO2: 99%   Weight: 113 kg (249 lb)   Height: 5' 2" (1 575 m)     Physical Exam:  Physical Exam  Constitutional:       General: She is not in acute distress  Appearance: She is well-developed  She is not diaphoretic  HENT:      Head: Normocephalic and atraumatic  Right Ear: External ear normal       Left Ear: External ear normal    Eyes:      General: No scleral icterus  Right eye: No discharge  Left eye: No discharge  Conjunctiva/sclera: Conjunctivae normal       Pupils: Pupils are equal, round, and reactive to light  Neck:      Thyroid: No thyromegaly  Vascular: No JVD  Trachea: No tracheal deviation  Cardiovascular:      Rate and Rhythm: Normal rate and regular rhythm  Heart sounds: No murmur heard  No friction rub  Gallop present  Pulmonary:      Effort: Pulmonary effort is normal  No respiratory distress  Breath sounds: Normal breath sounds  No stridor  No wheezing or rales  Chest:      Chest wall: No tenderness  Abdominal:      General: Bowel sounds are normal  There is distension  Palpations: Abdomen is soft  There is no mass  Tenderness: There is no abdominal tenderness  There is no guarding or rebound  Musculoskeletal:         General: No swelling, tenderness or signs of injury  Normal range of motion  Cervical back: Normal range of motion and neck supple  Skin:     General: Skin is warm and dry  Coloration: Skin is not pale  Findings: No erythema or rash  Neurological:      Mental Status: She is alert and oriented to person, place, and time  Cranial Nerves: No cranial nerve deficit  Motor: No abnormal muscle tone  Coordination: Coordination normal       Deep Tendon Reflexes: Reflexes are normal and symmetric  Reflexes normal    Psychiatric:         Behavior: Behavior normal          Thought Content: Thought content normal          Judgment: Judgment normal          Labs:     Lab Results   Component Value Date    WBC 6 26 05/23/2022    HGB 9 3 (L) 05/23/2022    HCT 29 9 (L) 05/23/2022    MCV 88 05/23/2022     05/23/2022     Lab Results   Component Value Date    K 3 8 05/23/2022    CL 99 (L) 05/23/2022    CO2 36 (H) 05/23/2022    BUN 38 (H) 05/23/2022    CREATININE 1 60 (H) 05/23/2022    GLUF 128 (H) 03/02/2022    CALCIUM 8 4 05/23/2022    CORRECTEDCA 9 5 05/17/2022    AST 13 05/17/2022    ALT 15 05/17/2022    ALKPHOS 59 05/17/2022    EGFR 32 05/23/2022     No results found for: CHOL  Lab Results   Component Value Date    HDL 30 (L) 03/10/2022     Lab Results   Component Value Date    LDLCALC 48 03/10/2022     Lab Results   Component Value Date    TRIG 132 03/10/2022     Lab Results   Component Value Date    HGBA1C 6 8 (H) 03/09/2022       Imaging & Testing   I have personally reviewed pertinent reports  EKG: Personally reviewed  Normal sinus rhythm poor R-wave progression no acute ST changes unchanged from prior      Cardiac testing:   Results for orders placed during the hospital encounter of 05/25/19   Echo complete with contrast if indicated    Narrative Muriel [address]  [phone] [facility] [phone]    Transthoracic Echocardiogram  2D, M-mode, Doppler, and Color Doppler    Study date:  29-May-2019    Patient: Blaire Hughes  MR number: [id number]  Account number: [de-identified]  : 1950  Age: 79 years  Gender: Female  Status: Inpatient  Location: Bedside  Height: 62 in  Weight: 250 6 lb  BP: 133/ 62 mmHg    Indications: Tachycardia    Diagnoses: I11 9 - Hypertensive heart disease without heart failure    Sonographer:  QUETA Patel  Referring Physician:  Marilyn Ramirez MD  Group:  [facility]  Interpreting Physician:  Kaitlyn Peck DO    SUMMARY    LEFT VENTRICLE:  Systolic function was normal by visual assessment  Ejection fraction was estimated to be 55 %  There were no regional wall motion abnormalities  Wall thickness was mildly to moderately increased  Doppler parameters were consistent with abnormal left ventricular relaxation (grade 1 diastolic dysfunction)  MITRAL VALVE:  There was mild regurgitation  AORTIC VALVE:  There was no evidence for stenosis  There was no regurgitation  TRICUSPID VALVE:  There was mild regurgitation  Pulmonary artery systolic pressure was within the normal range  HISTORY: PRIOR HISTORY: Diabetes,Diabetes, HTN, Hyperlipidemia, A Fib  PROCEDURE: The procedure was performed at the bedside  This was a routine study  The transthoracic approach was used  The study included complete 2D imaging, M-mode, complete spectral Doppler, and color Doppler  The heart rate was 77 bpm,  at the start of the study  Images were obtained from the parasternal, apical, subcostal, and suprasternal notch acoustic windows  Echocardiographic views were limited due to restricted patient mobility, poor patient compliance, poor  acoustic window availability, decreased penetration, and lung interference  This was a technically difficult study  LEFT VENTRICLE: Size was normal  Systolic function was normal by visual assessment   Ejection fraction was estimated to be 55 %  There were no regional wall motion abnormalities  Wall thickness was mildly to moderately increased  DOPPLER:  Doppler parameters were consistent with abnormal left ventricular relaxation (grade 1 diastolic dysfunction)  RIGHT VENTRICLE: The size was normal  Systolic function was normal     LEFT ATRIUM: The atrium was mildly dilated  RIGHT ATRIUM: Size was normal     MITRAL VALVE: Valve structure was normal  There was normal leaflet separation  No echocardiographic evidence for prolapse  DOPPLER: The transmitral velocity was within the normal range  There was no evidence for stenosis  There was mild  regurgitation  AORTIC VALVE: The valve was trileaflet  Leaflets exhibited normal thickness, normal cuspal separation, and sclerosis  DOPPLER: Transaortic velocity was within the normal range  There was no evidence for stenosis  There was no  regurgitation  TRICUSPID VALVE: The valve structure was normal  There was normal leaflet separation  DOPPLER: The transtricuspid velocity was within the normal range  There was mild regurgitation  Pulmonary artery systolic pressure was within the normal  range  Estimated peak PA pressure was 32 mmHg  PULMONIC VALVE: Leaflets exhibited normal thickness, no calcification, and normal cuspal separation  DOPPLER: The transpulmonic velocity was within the normal range  There was no regurgitation  PERICARDIUM: There was no thickening  There was no pericardial effusion  AORTA: The root exhibited normal size      PULMONARY ARTERY: The size was normal  The morphology appeared normal     SYSTEM MEASUREMENT TABLES    2D mode  AoR Diam 2D: 3 6 cm  LA Diam (2D): 3 9 cm  LA/Ao (2D): 1 08  FS (2D Teich): 26 9 %  IVSd (2D): 1 29 cm  LVDEV: 75 1 cmï¾³  LVESV: 35 3 cmï¾³  LVIDd(2D): 4 12 cm  LVISd (2D): 3 01 cm  LVOT Area 2D: 3 14 cmï¾²  LVPWd (2D): 1 2 cm  SV (Teich): 39 8 cmï¾³    Apical four chamber  LVEF A4C: 51 %    Unspecified Scan Mode  HANNA Cont Eq (Peak Gutierrez): 2 58 cmï¾²  LVOT Diam : 2 cm  LVOT Vmax: 963 mm/s  LVOT Vmax; Mean: 963 mm/s  Peak Grad ; Mean: 4 mm[Hg]  MV Peak A Gutierrez: 893 mm/s  MV Peak E Gutierrez  Mean: 805 mm/s  MVA (PHT): 3 67 cmï¾²  PHT: 60 ms  Max P mm[Hg]  V Max: 2360 mm/s  Vmax: 2430 mm/s  RA Area: 12 8 cmï¾²  RA Volume: 27 6 cmï¾³  TAPSE: 2 cm    Inters\[facility]\"" Commission Accredited Echocardiography Laboratory    Prepared and electronically signed by    Griffin Cisneros DO  Signed 29-May-2019 16:19:07       Sinus rhythm with APCs no acute ST changes  2022 normal sinus rhythm with poor R-wave progression    Richard Mccabe  Please call with any questions or suggestions    A description of the counseling:   Goals and Barriers:  Patient's ability to self care:  Medication side effect reviewed with patient in detail and all their questions answered  "This note has been constructed using a voice recognition system  Therefore there may be syntax, spelling, and/or grammatical errors   Please call if you have any questions  "

## 2022-05-26 ENCOUNTER — OFFICE VISIT (OUTPATIENT)
Dept: INTERNAL MEDICINE CLINIC | Facility: CLINIC | Age: 72
End: 2022-05-26
Payer: MEDICARE

## 2022-05-26 VITALS
DIASTOLIC BLOOD PRESSURE: 72 MMHG | OXYGEN SATURATION: 97 % | WEIGHT: 252 LBS | SYSTOLIC BLOOD PRESSURE: 122 MMHG | HEIGHT: 62 IN | HEART RATE: 75 BPM | BODY MASS INDEX: 46.38 KG/M2

## 2022-05-26 DIAGNOSIS — I50.32 CHRONIC DIASTOLIC CONGESTIVE HEART FAILURE (HCC): ICD-10-CM

## 2022-05-26 DIAGNOSIS — D64.9 ANEMIA, UNSPECIFIED TYPE: ICD-10-CM

## 2022-05-26 DIAGNOSIS — I48.91 ATRIAL FIBRILLATION WITH RAPID VENTRICULAR RESPONSE (HCC): ICD-10-CM

## 2022-05-26 DIAGNOSIS — N18.31 STAGE 3A CHRONIC KIDNEY DISEASE (HCC): ICD-10-CM

## 2022-05-26 DIAGNOSIS — E78.2 MIXED HYPERLIPIDEMIA: ICD-10-CM

## 2022-05-26 DIAGNOSIS — N17.9 AKI (ACUTE KIDNEY INJURY) (HCC): ICD-10-CM

## 2022-05-26 DIAGNOSIS — I10 ESSENTIAL HYPERTENSION: ICD-10-CM

## 2022-05-26 DIAGNOSIS — I48.0 PAROXYSMAL ATRIAL FIBRILLATION (HCC): Primary | ICD-10-CM

## 2022-05-26 DIAGNOSIS — I50.33 ACUTE ON CHRONIC DIASTOLIC CHF (CONGESTIVE HEART FAILURE) (HCC): ICD-10-CM

## 2022-05-26 DIAGNOSIS — J96.22 ACUTE ON CHRONIC RESPIRATORY FAILURE WITH HYPOXIA AND HYPERCAPNIA (HCC): ICD-10-CM

## 2022-05-26 DIAGNOSIS — E11.00 TYPE 2 DIABETES MELLITUS WITH HYPEROSMOLARITY WITHOUT COMA, WITHOUT LONG-TERM CURRENT USE OF INSULIN (HCC): ICD-10-CM

## 2022-05-26 DIAGNOSIS — J96.21 ACUTE ON CHRONIC RESPIRATORY FAILURE WITH HYPOXIA AND HYPERCAPNIA (HCC): ICD-10-CM

## 2022-05-26 DIAGNOSIS — R91.1 LUNG NODULE < 6CM ON CT: ICD-10-CM

## 2022-05-26 PROCEDURE — 99496 TRANSJ CARE MGMT HIGH F2F 7D: CPT | Performed by: INTERNAL MEDICINE

## 2022-05-26 NOTE — PATIENT INSTRUCTIONS
Patient's dry discharge weight at home was 244 lb  Today patient reports weight is 248 lb  Of    The patient currently is on torsemide 20 mg daily  Recommend if the weight is more than 248 lb to take extra torsemide  Patient is advised to check this regimen with cardiologist     A weigh yourself every day  Should you become short of breath at rest or you become very sleepy at home or any other cardiac symptoms such as heartbeat faster more than 90 or slow less than 50 the go to the emergency room  Follow-up with the cardiologist closely  Follow-up with the nephrologist closely  Follow-up with pulmonologist closely  Follow with Consultants as per their and our suggestion    Follow up in 2  week(s) or as needed earlier    Follow all instructions as advised and discussed  Take your medications as prescribed  Call the office immediately if you experience any side effects  Ask questions if you do not understand  Keep your scheduled appointment as advised or come sooner if necessary or in doubt  Best time to call for non-urgent matter or questions on weekdays is between 9am and 12 noon  See physician for any new symptoms or worsening of current symptoms  Urgent or emergent situations call 911 and report to nearest emergency room  I spent  40 minutes taking care of this patient including clinical care, conseling, collaboration, chart, lab and consultaion review  Patient is to get labs 1 week(s) prior to next visit if advised     Heart Failure   AMBULATORY CARE:   Heart failure  is a condition that does not allow your heart to fill or pump properly  Not enough oxygen in your blood gets to your organs and tissues  Fluid may not move through your body properly  Fluid builds up and causes swelling and trouble breathing  This is known as congestive heart failure  Heart failure may start in the left or right ventricle   Heart failure is often caused by damage or injury to your heart  The damage may be caused by other heart problems, diabetes, or high blood pressure  The damage may have also been caused by an infection  Heart failure is a long-term condition that tends to get worse over time  It is important to manage your health to improve your quality of life  Common signs and symptoms:   Trouble breathing with activity that worsens to trouble breathing at rest    Shortness of breath while lying flat    Severe shortness of breath and coughing at night that usually wakes you    Feeling lightheaded when you stand up    Purple color around your mouth and nails    Confusion or anxiety    Chest pain at night    Periods of no breathing, then breathing fast    Lack of energy (often worsened by physical activity), or trouble sleeping    Swelling in your ankles, legs, or abdomen    Heartbeat that is fast or not regular    Fingers and toes feel cool to the touch    Call your local emergency number (911 in the [address]) if:   You have any of the following signs of a heart attack:      Squeezing, pressure, or pain in your chest    You may  also have any of the following:     Discomfort or pain in your back, neck, jaw, stomach, or arm    Shortness of breath    Nausea or vomiting    Lightheadedness or a sudden cold sweat      Call your doctor if:   Your heartbeat is fast, slow, or uneven all the time  You have symptoms of worsening heart failure:      Shortness of breath at rest, at night, or that is getting worse in any way    Weight gain of 3 or more pounds (1 4 kg) in a day, or more than your healthcare provider says is okay    More swelling in your legs or ankles    Abdominal pain or swelling    More coughing    Loss of appetite    Feeling tired all the time    You feel hopeless or depressed, or you have lost interest in things you used to enjoy  You often feel worried or afraid  You have questions or concerns about your condition or care      Treatment for heart failure  may include any of the following:  Medicines  may be needed to help regulate your heart rhythm  You may also need medicines to lower your blood pressure, and to decrease extra fluids  Oxygen  may help you breathe easier if your oxygen level is lower than normal  A CPAP machine may be used to keep your airway open while you sleep  Cardiac rehab  is a program run by specialists who will help you safely strengthen your heart  In the program you will learn about exercise, relaxation, stress management, and heart-healthy nutrition  Cardiac rehab may be recommended if your heart failure is not severe  Surgery  can be done to implant a pacemaker or another device in your chest to regulate your heart rhythm  Other types of surgery can open blocked heart vessels, replace a damaged heart valve, or remove scar tissue  Manage swelling from extra fluid:   Elevate (raise) your legs above the level of your heart  This will help with fluid that builds up in your legs or ankles  Elevate your legs as often as possible during the day  Prop your legs on pillows or blankets to keep them elevated comfortably  Try not to stand for long periods of time during the day  Move around to keep your blood circulating  Limit sodium (salt)  Ask how much sodium you can have each day  Your healthcare provider may give you a limit, such as 2,300 milligrams (mg) a day  Your provider or a dietitian can teach you how to read food labels for the number of mg in a food  He or she can also help you find ways to have less salt  For example, if you add salt to food as you cook, do not add more at the table  Drink liquids as directed  You may need to limit the amount of liquid you drink within 24 hours  Your healthcare provider will tell you how much liquid to have and which liquids are best for you  He or she may tell you to limit liquid to 1 5 to 2 liters in a day   He or she will also tell you how often to drink liquid throughout the day     Weigh yourself every morning  Use the same scale, in the same spot  Do this after you use the bathroom, but before you eat or drink  Wear the same type of clothing each time  Write down your weight and call your healthcare provider if you have a sudden weight gain  Swelling and weight gain are signs of fluid buildup  Manage heart failure: Your quality of life may improve with treatment and the following:  Do not smoke  Nicotine and other chemicals in cigarettes and cigars can cause lung and heart damage  Ask your healthcare provider for information if you currently smoke and need help to quit  E-cigarettes or smokeless tobacco still contain nicotine  Talk to your healthcare provider before you use these products  Do not drink alcohol or use illegal drugs  Alcohol and drugs can increase your risk for high blood pressure, diabetes, and coronary artery disease  Eat heart-healthy foods  Heart-healthy foods include fruits, vegetables, lean meat (such as beef, chicken, or pork), and low-fat dairy products  Fatty fish such as salmon and tuna are also heart healthy  Other heart-healthy foods include walnuts, whole-grain breads, beans, and cooked beans  Replace butter and margarine with heart-healthy oils such as olive oil or canola oil  Your provider or a dietitian can help you create heart-healthy meal plans  Manage any chronic health conditions you have  These include high blood pressure, diabetes, obesity, high cholesterol, metabolic syndrome, and COPD  You will have fewer symptoms if you manage these health conditions  Follow your healthcare provider's recommendations and follow up with him or her regularly  Maintain a healthy weight  Being overweight can increase your risk for high blood pressure, diabetes, and coronary artery disease  These conditions can make your symptoms worse  Ask your healthcare provider how much you should weigh   Ask him or her to help you create a weight loss plan if you are overweight  Stay active  Activity can help keep your symptoms from getting worse  Walking is a type of physical activity that helps maintain your strength and improve your mood  Physical activity also helps you manage your weight  Work with your healthcare provider to create an exercise plan that is right for you  Get vaccines as directed  The flu and pneumonia can be severe for a person who has heart failure  Vaccines protect you from these infections  Get a flu shot every year as soon as it is recommended, usually in September or October  You may also need the pneumonia vaccine  Your healthcare provider can tell you if you need other vaccines, and when to get them  Follow up with your doctor or cardiologist within 7 days or as directed: You may need to return for other tests  You may need home health care  A healthcare provider will monitor your vital signs, weight, and make sure your medicines are working  Write down your questions so you remember to ask them during your visits  Join a support group:  Heart failure can be difficult to manage  It may be helpful to talk with others who have heart failure  You may learn how to better manage your condition or get emotional support  For more information:  [address] [place] [address]   Phone: 0- [phone] - [phone]  Web Address: https://"[employer]/  [address] [facility] 2022 Information is for End User's use only and may not be sold, redistributed or otherwise used for commercial purposes  All illustrations and images included in CareNotes® are the copyrighted property of [employer]  or [address] [place], [place] [address]lev   The above information is an  only  It is not intended as medical advice for individual conditions or treatments  Talk to your doctor, nurse or pharmacist before following any medical regimen to see if it is safe and effective for you

## 2022-05-26 NOTE — PROGRESS NOTES
Eugene [facility] Office Visit Note  22     Marycruz Kuhn 73 y o  female MRN: [id number]  : 1950    Assessment:     1  Paroxysmal atrial fibrillation ([facility])    2  Chronic diastolic congestive heart failure ([facility] )    3  Acute on chronic respiratory failure with hypoxia and hypercapnia ([facility])    4  Type 2 diabetes mellitus with hyperosmolarity without coma, without long-term current use of insulin ([facility] )    5  Acute on chronic diastolic CHF (congestive heart failure) ([place] [address] )    6  Atrial fibrillation with rapid ventricular response ([facility])    7  SEBASTIAN (acute kidney injury) ([place] [address] )    8  Stage 3a chronic kidney disease ([place] [address] )          Discussion Summary and Plan: Today's care plan and medications were reviewed with patient in detail and all their questions answered to their satisfaction  In summary patient with very complex issue  Patient is a known case of chronic diastolic congestive heart failure recent decompensation as per in part secondary to home uncontrolled atrial fibrillation and patient ended up having acute or acute on chronic respiratory failure with significantly high pCO2 retention  Home patient is discharge weight noted to 244 lb at home  She is starting to retain water weight at home is 248 lb  Patient thinks that water pill is not working well and patient is not putting out enough urine  Patient was seen by cardiologist     We did discuss about sliding scale for the water pill  If weight is more than 248 lb patient is allowed to take 1 more torsemide 20 mg  However also risk of torsemide were reviewed with them  Patient does have a complex issue  Patient's GFR is in the range of 35  Creatinine in the range of 1 point 4  The home diabetes is fair  Anemia issues were reviewed the hemoglobin is in the range of 9 I will order CBC with BMP that she is going for a next week  I would like to follow her back in 2 weeks  Patient is at risk of unplanned hospitalizations due to complexity  Will get complex care nurse involved  Patient also is unable to tolerate BiPAP  Patient is thought to have a hypoventilation secondary to obesity related P CO2 retention and chronic hypoxia  Also patient has underlying asthma on top of congestive heart failure and chronic kidney disease  The patient to is afraid to use BiPAP due to claustrophobia  Patient was requesting antianxiety medications IA the explain her risk of antianxiety particularly with pCO2 retention home with hypoventilation as well as patient is already on Lyrica  Risk of giving this to high unless pulmonologist feel that is necessary  All questions were answered  Hypertension well controlled atrial fibrillation well-controlled  All questions were answered to their satisfaction  Time worse spent is greater than 45 minutes  Will follow back in 2 weeks and closely     Chief Complaint   Patient presents with    Transition of Care Management    Atrial Fibrillation    Congestive Heart Failure    Follow-up     Ac respiratory failure, UTI    Obesity    Diabetes      Subjective:  TCM Call (since 4/25/2022)     Date and time call was made  5/24/2022 11:17 AM    Hospital care reviewed  Records reviewed    Patient was hospitialized at  [place] [address]    Date of Admission  05/16/22    Date of discharge  05/23/22    Diagnosis  ARF on CRF, Acute on Chronic Heart Failure, CKD4, anemia, A   fib    Disposition  Home    Were the patients medications reviewed and updated  Yes  Reviewed in full   with her  I called pharmacy personally about the availabilty of her   insulin  It will be ready later today  Pt informed  Current Symptoms  None  She says that she feels good      TCM Call (since 4/25/2022)     Post hospital issues  None  States that she is more actice  I also   recommended rest, not to over do it  Should patient be enrolled in anticoag monitoring? No    Scheduled for follow up?   Yes    Patients specialists  Cardiologist; Pulmonlolgist    Cardiologist name  Dr Madison Baez    Cardiologist contact #  [phone]    Pulmonologist name  Nate VERNON    Pulmonologist contact #  [phone]    Endocrinologist name  [phone]    Did you obtain your prescribed medications  Yes  Pt thought her Insulin   was backordered at Pharm  I personally placed a phone call today to pharm  Her insulin will be ready later today  Pt was informed  Do you need help managing your prescriptions or medications  No    Is transportation to your appointment needed  No  She has a family member   who assists her and drives her to her appts  I have advised the patient to call PCP with any new or worsening symptoms    Lilly Zuniga, Practice Administrator II    Living Arrangements  Family members    Support System  Family  Lives with her neice  Who helps care for pt  The type of support provided  Emotional; Physical    Do you have social support  Yes, as much as I need    Are you recieving any outpatient services  No    Are you recieving home care services  Yes    Types of home care services  Home PT; Nurse visit    Are you using any community resources  No    Current waiver services  No    Have you fallen in the last 12 months  No    Interperter language line needed  No    Counseling  Patient    Counseling topics  Importance of RX compliance; Activities of daily   living; patient and family education    Comments  Asked pt to get an appt with Cardiology sooner  Not to wait   unti her appt in 1 month  Her TCM visit with PCP is here in 2days   considering that she is high risk        Discharge Diagnoses:      Principal Problem:    Acute on chronic respiratory failure with hypoxia and hypercapnia (HCC)  Active Problems:    Acute on chronic diastolic CHF (congestive heart failure) (HCC)    Diabetes mellitus, type 2 (HCC)    Acute kidney injury on CKD stage 3    Atrial fibrillation with rapid ventricular response (HCC)    Obesity hypoventilation syndrome ([facility] )    Asthma    Mixed hyperlipidemia    Essential hypertension    Status post reverse total replacement of left shoulder    Lower leg edema    Anemia    Morbid obesity ([facility] )  Resolved Problems:    UTI (urinary tract infection)    Epistaxis    Acute encephalopathy    Positive blood culture       Patient recently was hospitalized  Patient was treated for acute on chronic diastolic congestive heart failure     Patient had 2 recent hospitalization  Initially presented on 05/13 subsequently re-presented on 05/16/2022  Patient's weight was 267 lb on 5-13  Discharge weight on 05/22/2022 was 247 lb  Chest x-ray revealed vascular congestion  Ejection fraction revealed normal ejection fraction  Patient was transitioned from Lasix to torsemide 20 mg daily     Patient's weight on her own home scale on the day of the discharge was 244 lb  Weight today at home is 248 lb  Weight here today is 252 lb  Patient was seen by cardiologist on Tuesday  Weight was stable at that 0 2 44 lb      Patient also had acute on chronic respiratory failure with hypoxia and hypercapnia  Initial VBG revealed pH of 7 019--127--55 -38  Patient improved with diuresis  Patient is claustrophobic  Patient was advised to use the BiPAP  Patient is afraid to use it because of the anxiety  Patient has upcoming appointment on 14th of  I advised them that they need to talk to pulmonologist about use of antianxiety  I did explain that antianxiety can did a suppressed the breathing particularly if she starts retaining pCO2 who  This is going to be a challenging situation  The reason for using BiPAP is for the obesity related hypoventilation  Patient is on oxygen at nighttime when she is not using BiPAP  Atrial fibrillation with rapid ventricular rate:  Patient's heart rate was rapid  Patient's metoprolol were increased to 50 mg q 12 hour  Patient remains on Eliquis       Acute kidney injury  Patient does have a CKD level 3   Baseline creatinine is is in the range of 1 4-1 6  Patient presented with creatinine of 2 1  Patient recently was on Bactrim  Patient's number improved at the time of discharge  Diabetes:  Patient is currently monitoring blood sugar  Hemoglobin A1c 03/09/2022 was 6 8  Currently on Toujeo 25 units daily  As well as sliding scale  Home sugar is not low  Also remains on Trulicity 1 5 weekly  The    Hypertension remains on metoprolol as outlined above  History of tore total replacement of the left shoulder the:  Patient reports lot better than before        Hyperlipidemia remains on rosuvastatin 10 mg daily  Asthma th:  Patient is using at albuterol p r n  Urinary tract infection:  Symptom-free     The morbid obesity BMI 47  Anemia  Follow-up iron studies, iron 52, iron sat 20% ferritin 236, TIBC 261  B12, folate adequate  Prior immunofixation without any evidence of monoclonal gammopathy  Hemoglobin in the range of 9 g       Lower leg edema:  Mild to moderate  Patient generally seems to be doing fair  She does have a mild to moderate edema  See is her room oxygen saturation is fair  Patient does not have any urinary symptoms  Patient does not have any chest pain palpitation PND  Patient does have a edema as outlined above  Patient does have dyspnea on exertion  We had extensive discussions regarding respiratory failure order the symptoms to watch for about ,congestive heart failure what other symptoms to watch fo,r kidney failure what are the symptoms to watch for     XR chest 1 view portable     Result Date: 5/16/2022  Narrative: CHEST INDICATION:   shortness of breath, respiratory distress  COMPARISON:  5/13/2022 EXAM PERFORMED/VIEWS:  XR CHEST PORTABLE FINDINGS: Cardiomediastinal silhouette appears mildly enlarged with mild increased vascular congestion and increased bilateral pleural effusions  No pneumothorax   Left shoulder arthroplasty again noted       Impression: Mild cardiomegaly Mild increased vascular congestion Increased bilateral pleural effusions Workstation performed: [id number]      XR chest 1 view portable     Result Date: 5/13/2022  Narrative: CHEST INDICATION:   SOB  COMPARISON:  Chest radiograph April 1, 2022 EXAM PERFORMED/VIEWS:  XR CHEST PORTABLE FINDINGS: Cardiomediastinal silhouette appears unremarkable  New mild prominence of the interstitial markings  Small bilateral pleural effusions  No pneumothorax Left shoulder arthroplasty       Impression: Mild pulmonary vascular congestion with small bilateral pleural effusions  Workstation performed: [id number]      CT head without contrast     Result Date: 5/16/2022  Narrative: CT BRAIN - WITHOUT CONTRAST INDICATION:   Mental status change, unknown cause altered mental status  COMPARISON:  2/17/2022 TECHNIQUE:  CT examination of the brain was performed  In addition to axial images, sagittal and coronal 2D reformatted images were created and submitted for interpretation  Radiation dose length product (DLP) for this visit:  912 34 mGy-cm   This examination, like all CT scans performed in the [facility], was performed utilizing techniques to minimize radiation dose exposure, including the use of iterative  reconstruction and automated exposure control  IMAGE QUALITY:  Diagnostic  FINDINGS: PARENCHYMA: Decreased attenuation is noted in periventricular and subcortical white matter demonstrating an appearance that is statistically most likely to represent mild microangiopathic change  No CT signs of acute infarction  No intracranial mass, mass effect or midline shift  No acute parenchymal hemorrhage  VENTRICLES AND EXTRA-AXIAL SPACES:  Normal for the patient's age  VISUALIZED ORBITS AND PARANASAL SINUSES:  There is a left mastoid effusion, not present previously  There is mild left sphenoid sinus and ethmoid air cell mucosal thickening  CALVARIUM AND EXTRACRANIAL SOFT TISSUES:  Normal       Impression: 1    No acute intracranial abnormality  2   New left mastoid effusion  Correlation for acute mastoiditis recommended  Workstation performed: [id number]         Incidental Findings:   · Incidental mastoid effusion     Test Results Pending at Discharge (will require follow up):   · As per After Visit Summary     Outpatient Tests Requested:  · BMP as outpatient in 1 week     Complications:  Refer to hospital course and above listed diagnosis related plan, if any     Hospital Course: As per HPI  Ivette Tom is a 73 y o  female patient with multiple comorbidities including atrial fibrillation on anticoagulation, morbid obesity, diabetes, diastolic CHF who originally presented to the hospital on 5/16/2022 due to shortness of breath and lethargy  As per the family patient was noted to be somnolent and lethargic  In ED patient was noted recently for shortness of breath and tachycardia  Patient was also on treatment for UTI as outpatient  Patient was placed on BiPAP due to lethargy  ABG revealed hypercapnia and hypoxia patient was subsequently admitted           Admission on 05/16/2022, Discharged on 05/23/2022  No results displayed because visit has over 200 results      ------------  Admission on 05/13/2022, Discharged on 05/13/2022  WBC                       Value: 8 30(Thousand/uL)  Dt: 05/13/2022  RBC                       Value: 3 30(Million/uL) (A)Dt: 05/13/2022  Hemoglobin                Value: 9 2(g/dL) (A)      Dt: 05/13/2022  Hematocrit                Value: 31 4(%) (A)        Dt: 05/13/2022  MCV                       Value: 95(fL)             Dt: 05/13/2022  MCH                       Value: 27 9(pg)           Dt: 05/13/2022  MCHC                      Value: 29 3(g/dL) (A)     Dt: 05/13/2022  RDW                       Value: 14 3(%)            Dt: 05/13/2022  MPV                       Value: 10 5(fL)           Dt: 05/13/2022  Platelets                 Value: 174(Thousands/uL)  Dt: 05/13/2022  nRBC Value: 0(/100 WBCs)       Dt: 05/13/2022  Neutrophils Relative      Value: 58(%)              Dt: 05/13/2022  Immat GRANS %             Value: 0(%)               Dt: 05/13/2022  Lymphocytes Relative      Value: 26(%)              Dt: 05/13/2022  Monocytes Relative        Value: 10(%)              Dt: 05/13/2022  Eosinophils Relative      Value: 5(%)               Dt: 05/13/2022  Basophils Relative        Value: 1(%)               Dt: 05/13/2022  Neutrophils Absolute      Value: 4 87(Thousands/µL) Dt: 05/13/2022  Immature Grans Absolute   Value: 0 03(Thousand/uL)  Dt: 05/13/2022  Lymphocytes Absolute      Value: 2 12(Thousands/µL) Dt: 05/13/2022  Monocytes Absolute        Value: 0 84(Thousand/µL)  Dt: 05/13/2022  Eosinophils Absolute      Value: 0 38(Thousand/µL)  Dt: 05/13/2022  Basophils Absolute        Value: 0 06(Thousands/µL) Dt: 05/13/2022  Protime                   Value: 14  8(seconds) (A)  Dt: 05/13/2022  INR                       Value: 1 18               Dt: 05/13/2022  PTT                       Value: 33(seconds)        Dt: 05/13/2022  Sodium                    Value: 141(mmol/L)        Dt: 05/13/2022  Potassium                 Value: 4 4(mmol/L)        Dt: 05/13/2022  Chloride                  Value: 99(mmol/L) (A)     Dt: 05/13/2022  CO2                       Value: 37(mmol/L) (A)     Dt: 05/13/2022  ANION GAP                 Value: 5(mmol/L)          Dt: 05/13/2022  BUN                       Value: 36(mg/dL) (A)      Dt: 05/13/2022  Creatinine                Value: 1 49(mg/dL) (A)    Dt: 05/13/2022  Glucose                   Value: 282(mg/dL) (A)     Dt: 05/13/2022  Calcium                   Value: 8 0(mg/dL) (A)     Dt: 05/13/2022  Corrected Calcium         Value: 8 7(mg/dL)         Dt: 05/13/2022  AST                       Value: 10(U/L)            Dt: 05/13/2022  ALT                       Value: 15(U/L)            Dt: 05/13/2022  Alkaline Phosphatase      Value: 91(U/L)            Dt: 05/13/2022  Total Protein             Value: 7 4(g/dL)          Dt: 05/13/2022  Albumin                   Value: 3 1(g/dL) (A)      Dt: 05/13/2022  Total Bilirubin           Value: 0 19(mg/dL) (A)    Dt: 05/13/2022  eGFR                      Value: 35(ml/min/1 73sq m) Dt: 05/13/2022  Magnesium                 Value: 1 8(mg/dL)         Dt: 05/13/2022  hs TnI 0hr                Value: 9(ng/L)            Dt: 05/13/2022  NT-proBNP                 Value: 1,548(pg/mL) (A)   Dt: 05/13/2022  hs TnI 2hr                Value: 10(ng/L)           Dt: 05/13/2022  Delta 2hr hsTnI           Value: 1(ng/L)            Dt: 05/13/2022  Color, UA                 Value: Yellow             Dt: 05/13/2022  Clarity, UA               Value: Clear              Dt: 05/13/2022  Specific Gravity, UA      Value: 1 025              Dt: 05/13/2022  pH, UA                    Value: 5 5                Dt: 05/13/2022  Leukocytes, UA            Value: Trace (A)          Dt: 05/13/2022  Nitrite, UA               Value: Negative           Dt: 05/13/2022  Protein, UA               Value: 30 (1+)(mg/dl) (A) Dt: 05/13/2022  Glucose, UA               Value: Negative(mg/dl)    Dt: 05/13/2022  Ketones, UA               Value: Negative(mg/dl)    Dt: 05/13/2022  Urobilinogen, UA          Value: 0 2(E U /dl)       Dt: 05/13/2022  Bilirubin, UA             Value: Negative           Dt: 05/13/2022  Blood, UA                                           Dt: 05/13/2022                  Value: Trace-Intact   RBC, UA                   Value: 1-2(/hpf)          Dt: 05/13/2022  WBC, UA                   Value: 20-30(/hpf) (A)    Dt: 05/13/2022  Epithelial Cells          Value:  Moderate(/hpf) (A) Dt: 05/13/2022  Bacteria, UA              Value: Occasional(/hpf)   Dt: 05/13/2022  MUCUS THREADS             Value: Occasional (A)     Dt: 05/13/2022  Urine Culture                                       Dt: 05/13/2022                  Value: 50,000-59,000 cfu/ml    Ventricular Rate          Value: 85(BPM)            Dt: 05/13/2022  Atrial Rate               Value: 85(BPM)            Dt: 05/13/2022  SD Interval               Value: 186(ms)            Dt: 05/13/2022  QRSD Interval             Value: 72(ms)             Dt: 05/13/2022  QT Interval               Value: 372(ms)            Dt: 05/13/2022  QTC Interval              Value: 442(ms)            Dt: 05/13/2022  P Axis                    Value: 56(degrees)        Dt: 05/13/2022  QRS Axis                  Value: 8(degrees)         Dt: 05/13/2022  T Wave Axis               Value: 27(degrees)        Dt: 05/13/2022  ------------ - 2 weeks       Procedure: CT chest abdomen pelvis wo contrast    Result Date: 3/29/2022  Narrative: CT CHEST, ABDOMEN AND PELVIS WITHOUT IV CONTRAST INDICATION:   Sepsis wbc 12 from normal in a day - possible source uti ? additional source of sepsis?   COMPARISON: Prior chest CT study dated 3/17/2022 and prior CT scan of the abdomen and pelvis dated 1/1/2020  Salty Fernandez TECHNIQUE: CT examination of the chest, abdomen and pelvis was performed without intravenous contrast   Axial, sagittal, and coronal 2D reformatted images were created from the source data and submitted for interpretation  Radiation dose length product (DLP) for this visit:  2401 25 mGy-cm   This examination, like all CT scans performed in the [facility], was performed utilizing techniques to minimize radiation dose exposure, including the use of iterative reconstruction and automated exposure control  Enteric contrast was not administered  FINDINGS: CHEST LUNGS:  Passive atelectasis in the right lower lobe and minimal patchy dependent atelectasis in the left lower lobe  8 x 6 mm nodule in the right lower lobe laterally on series 3, image 46 is not significantly changed since 3/17/2022  Continued short-term follow-up is advised in 3 months  Stable 4 mm pulmonary nodule in the right upper lobe apex on series 3, image 14   Stable 4 mm nodule in the right middle lobe on series 3, image 38  Patchy nodular groundglass densities have significantly decreased when compared with the prior study  There is a 5 mm nodule in the right lower lobe on series 3, image 53 previously obscured by patchy airspace consolidation  5 mm pulmonary nodule in the superior segment of the left lower lobe on series 3, image 39 appears stable  No obvious endobronchial lesion  PLEURA:  There is a small right pleural effusion significantly decreased since prior study  New complete resolution of previously seen small left pleural effusion  No pneumothorax  HEART/GREAT VESSELS: No cardiomegaly  No thoracic aortic aneurysm  Scattered coronary artery and aortic calcifications  No pericardial effusion  Right jugular central venous catheter terminates in the proximal SVC  MEDIASTINUM AND LUIGI:  No significant mediastinal, hilar, or axillary lymphadenopathy  CHEST WALL AND LOWER NECK:   Tiny nodular density in the left breast is stable when compared with the prior study of 2020  No thyroid gland enlargement  ABDOMEN LIVER/BILIARY TREE:  Unremarkable  GALLBLADDER:  No calcified gallstones  No pericholecystic inflammatory change  SPLEEN:  Unremarkable  PANCREAS:  Atrophic as before  ADRENAL GLANDS:  Unremarkable  KIDNEYS/URETERS:  The kidneys appear slightly lobulated in contour as before  No CT evidence of nephrolithiasis  No hydronephrosis or hydroureter  Stable appearing mild bilateral perinephric stranding  No ureteral calculus  STOMACH AND BOWEL:  No bowel obstruction  Scattered colonic diverticulosis without diverticulitis  No focal inflammatory process  APPENDIX:  No findings to suggest appendicitis  ABDOMINOPELVIC CAVITY:  No ascites  No pneumoperitoneum  No lymphadenopathy  VESSELS:  The abdominal aorta is calcified but normal in caliber  No retroperitoneal hematoma  PELVIS REPRODUCTIVE ORGANS:  Unremarkable for patient's age  URINARY BLADDER:  Unremarkable   ABDOMINAL WALL/INGUINAL REGIONS:  Small fat-containing periumbilical hernia  OSSEOUS STRUCTURES:  No acute fracture or destructive osseous lesion  Degenerative changes in the thoracolumbar spine  Mild levoscoliosis of the thoracic spine with concomitant mild dextroscoliosis of the lumbar spine  Impression: Decreased bilateral pleural effusions and pulmonary edema as described above with a residual small right pleural effusion remaining  Passive atelectasis in the lower lobes  Multiple pulmonary nodules as described above  Short-term follow-up chest CT study is advised in 3 months  No hydronephrosis or obstructive uropathy  No bowel obstruction or focal inflammatory process  Stable additional findings as above  Workstation performed: [id number]     Procedure: XR chest 1 view portable    Result Date: 5/16/2022  Narrative: CHEST INDICATION:   shortness of breath, respiratory distress  COMPARISON:  5/13/2022 EXAM PERFORMED/VIEWS:  XR CHEST PORTABLE FINDINGS: Cardiomediastinal silhouette appears mildly enlarged with mild increased vascular congestion and increased bilateral pleural effusions  No pneumothorax  Left shoulder arthroplasty again noted  Impression: Mild cardiomegaly Mild increased vascular congestion Increased bilateral pleural effusions Workstation performed: [id number]     Procedure: XR chest 1 view portable    Result Date: 5/13/2022  Narrative: CHEST INDICATION:   SOB  COMPARISON:  Chest radiograph April 1, 2022 EXAM PERFORMED/VIEWS:  XR CHEST PORTABLE FINDINGS: Cardiomediastinal silhouette appears unremarkable  New mild prominence of the interstitial markings  Small bilateral pleural effusions  No pneumothorax Left shoulder arthroplasty  Impression: Mild pulmonary vascular congestion with small bilateral pleural effusions  Workstation performed: [id number]     Procedure: XR chest portable    Result Date: 4/1/2022  Narrative: CHEST INDICATION:   follow up CHF  Lloyd Jessicae  COMPARISON:  Chest radiograph March 28, 2022 at 15:03  Correlation with chest CT March 29, 2022 EXAM PERFORMED/VIEWS:  XR CHEST PORTABLE FINDINGS: Cardiomediastinal silhouette appears unremarkable  The right internal jugular central venous catheter is no longer present  Mild blunting of the right costophrenic angle is similar to prior study  No focal consolidation or pneumothorax  Partially imaged reverse total left arthroplasty  Impression: Small right pleural effusion is similar to prior study  Workstation performed: [id number]     Procedure: XR chest 1 view portable    Result Date: 3/29/2022  Narrative: CHEST INDICATION:   Central line placement  COMPARISON:  Earlier the same date  EXAM PERFORMED/VIEWS:  XR CHEST PORTABLE FINDINGS:  No pneumothorax status post placement of right IJ catheter with the tip in the SVC  Cardiomediastinal silhouette appears unremarkable  No consolidation  Blunting of the right costophrenic angle suggests small effusion  Reverse left shoulder arthroplasty again seen  Impression: Right IJ catheter tip in SVC  No pneumothorax  Small right effusion  Workstation performed: [id number]     Procedure: XR chest 1 view portable    Result Date: 3/29/2022  Narrative: CHEST INDICATION:   AFib  COMPARISON:  One view chest 3/28/2022 at 00:09 EXAM PERFORMED/VIEWS:  XR CHEST PORTABLE FINDINGS: Normal cardiac silhouette  Aortic calcification is present  No airspace consolidation, pneumothorax, pulmonary edema, or pleural effusion  Partially visualized left shoulder reverse prosthesis  Degenerative changes of the spine  Impression: No radiographic evidence of acute intrathoracic process or significant interval change  Workstation performed: [id number]     Procedure: XR chest 1 view portable    Result Date: 3/28/2022  Narrative: CHEST INDICATION:   sob  COMPARISON:  Multiple priors most recently 3/20/2022 EXAM PERFORMED/VIEWS:  XR CHEST PORTABLE FINDINGS: Cardiomediastinal silhouette appears unremarkable  The lungs are clear    No pneumothorax or pleural effusion  Reverse left shoulder arthroplasty  Impression: No acute cardiopulmonary disease  Workstation performed: [id number]     Procedure: CT head without contrast    Result Date: 5/16/2022  Narrative: CT BRAIN - WITHOUT CONTRAST INDICATION:   Mental status change, unknown cause altered mental status  COMPARISON:  2/17/2022 TECHNIQUE:  CT examination of the brain was performed  In addition to axial images, sagittal and coronal 2D reformatted images were created and submitted for interpretation  Radiation dose length product (DLP) for this visit:  912 34 mGy-cm   This examination, like all CT scans performed in the [facility], was performed utilizing techniques to minimize radiation dose exposure, including the use of iterative  reconstruction and automated exposure control  IMAGE QUALITY:  Diagnostic  FINDINGS: PARENCHYMA: Decreased attenuation is noted in periventricular and subcortical white matter demonstrating an appearance that is statistically most likely to represent mild microangiopathic change  No CT signs of acute infarction  No intracranial mass, mass effect or midline shift  No acute parenchymal hemorrhage  VENTRICLES AND EXTRA-AXIAL SPACES:  Normal for the patient's age  VISUALIZED ORBITS AND PARANASAL SINUSES:  There is a left mastoid effusion, not present previously  There is mild left sphenoid sinus and ethmoid air cell mucosal thickening  CALVARIUM AND EXTRACRANIAL SOFT TISSUES:  Normal      Impression: 1  No acute intracranial abnormality  2   New left mastoid effusion  Correlation for acute mastoiditis recommended   Workstation performed: [id number]     Procedure: VAS upper limb venous duplex scan, unilateral/limited    Result Date: 3/29/2022  Narrative:  [facility] REPORT CLINICAL: Indications: Physician wants to determine patency of the venous system secondary to recent history of superficial thrombophlebitis of the LUE - evaluate progression  Operative History: Patient denies any cardiovascular surgeries  Risk Factors: The patient has history of HTN, Diabetes (Yes), Hyperlipidemia, CKD and previous smoking (quit >10yrs ago)  FINDINGS:  Right         Impression  Ceph Mid Arm  Thrombus       CONCLUSION: Impression RIGHT UPPER LIMB LIMITED: Evaluation shows no evidence of thrombus in the internal jugular vein, subclavian vein, and the brachiocephalic vein  LEFT UPPER LIMB: Evidence of superficial thrombophlebitis noted in the cephalic vein  No evidence of acute or chronic deep vein thrombosis  Doppler evaluation shows a normal response to augmentation maneuvers  Technically difficult/limited study due to immobile shoulder, pitting edema and poor visualization of deep system  In comparison to the study of 3/10/2022, there is no progression of the superficial thrombophlebitis  SIGNATURE: Electronically Signed by: Sav Motley MD on 2022-03-29 02:39:57 PM           The following portions of the patient's history were reviewed and updated as appropriate: allergies, current medications, past family history, past medical history, past social history, past surgical history and problem list     Review of Systems   All other systems reviewed and are negative          Historical Information   Patient Active Problem List   Diagnosis    Asthma    Morbid obesity with BMI of 45 0-49 9, adult (HCC)    Lower leg edema    Paroxysmal atrial fibrillation (HCC)    Mixed hyperlipidemia    Anemia    Essential hypertension    Humeral head fracture    PONV (postoperative nausea and vomiting)    SEBASTIAN (acute kidney injury) ([facility] [address] )    Diabetes mellitus, type 2 (HCC)    Gastroesophageal reflux disease    Nephrolithiasis    Arthritis    S/P total knee replacement, right    On continuous oral anticoagulation - eliquis    Closed 4-part fracture of proximal humerus, left, initial encounter    Status post reverse total replacement of left shoulder  Chronic diastolic congestive heart failure (HCC)    Acute kidney injury on CKD stage 3    Peripheral venous insufficiency    Post-herpetic polyneuropathy    Raynaud's disease    Lung nodule < 6cm on CT    Atrial fibrillation with rapid ventricular response (HCC)    Acute on chronic diastolic CHF (congestive heart failure) (HCC)    Pulmonary nodules    Lump of left breast    Pressure injury of deep tissue of sacral region    Morbid obesity (HCC)    Pure hypercholesterolemia    Acute on chronic respiratory failure with hypoxia and hypercapnia (HCC)    Obesity hypoventilation syndrome (HCC)     Past Medical History:   Diagnosis Date    Anemia     Asthma     COVID-19 January 2022    GERD (gastroesophageal reflux disease)     Hyperlipidemia     Kidney stones     PONV (postoperative nausea and vomiting)     SVT (supraventricular tachycardia) (HCC)      Past Surgical History:   Procedure Laterality Date    APPENDECTOMY      CYSTOSCOPY W/ LASER LITHOTRIPSY Left 7/12/2016    Procedure: CYSTOSCOPY URETEROSCOPY WITH LITHOTRIPSY HOLMIUM LASER, RETROGRADE PYELOGRAM AND INSERTION STENT URETERAL;  Surgeon: Vel Mendoza MD;  Location: [address] [place] [address];  Service:     DILATION AND CURETTAGE OF UTERUS      IR THORACENTESIS  3/18/2022    JOINT REPLACEMENT      right knee    KNEE ARTHROPLASTY Right     CO CYSTOURETHROSCOPY,URETER CATHETER Left 6/29/2016    Procedure: CYSTOSCOPY RETROGRADE PYELOGRAM WITH INSERTION STENT URETERAL, left;  Surgeon: Vel Mendoza MD;  Location: [facility];  Service: Urology    CO RECONSTR TOTAL SHOULDER IMPLANT Left 3/1/2022    Procedure: ARTHROPLASTY SHOULDER REVERSE;  Surgeon: Yuli Trimble MD;  Location: [facility];  Service: Orthopedics    SHOULDER ARTHROTOMY Left      Social History     Substance and Sexual Activity   Alcohol Use Not Currently    Comment: rarely     Social History     Substance and Sexual Activity   Drug Use No     Social History Tobacco Use   Smoking Status Former Smoker    Packs/day:  00    Years: 20 00    Pack years: 20 00    Types: Cigarettes    Quit date: 1996    Years since quittin 8   Smokeless Tobacco Never Used     Family History   Problem Relation Age of Onset    Heart disease Mother     Emphysema Maternal Grandmother     Heart disease Family      Health Maintenance Due   Topic    Hepatitis C Screening     Medicare Annual Wellness Visit (AWV)     Pneumococcal Vaccine: 65+ Years (1 - PCV)    Diabetic Foot Exam     DM Eye Exam     BMI: Followup Plan     DTaP,Tdap,and Td Vaccines (1 - Tdap)    Breast Cancer Screening: Mammogram     Osteoporosis Screening     Fall Risk     URINE MICROALBUMIN     COVID-19 Vaccine (2 - Moderna series)      Meds/Allergies       Current Outpatient Medications:     acetaminophen (TYLENOL) 325 mg tablet, Take 650 mg by mouth every 6 (six) hours as needed for mild pain  , Disp: , Rfl:     albuterol (PROVENTIL HFA,VENTOLIN HFA) 90 mcg/act inhaler, Inhale 2 puffs every 6 (six) hours as needed for wheezing, Disp: 8 g, Rfl: 1    ammonium lactate (LAC-HYDRIN) 12 % lotion, APPLY TOPICALLY TO AFFECTED AREAS twice a day, Disp: , Rfl:     apixaban (ELIQUIS) 5 mg, Take 1 tablet (5 mg total) by mouth in the morning and 1 tablet (5 mg total) in the evening , Disp: 180 tablet, Rfl: 1    b complex-vitamin C-folic acid (NEPHROCAPS) 1 mg capsule, Take 1 capsule by mouth daily, Disp: 30 capsule, Rfl: 5    docusate sodium (COLACE) 100 mg capsule, Take 100 mg by mouth in the morning, Disp: , Rfl:     glucose blood (OneTouch Ultra) test strip, Test twice daily  , Disp: 100 strip, Rfl: 2    insulin lispro (HumaLOG KwikPen) 100 units/mL injection pen, 3 times with meal according to sliding scale - >Blood Glucose 150 - 199: 2 Unit of Insulin, Blood Glucose 200 - 249: 4 Units of Insulin, Blood Glucose 250 - 299: 6 Units of Insulin, Blood Glucose 300 - 349: 8 Units of Insulin, Blood Glucose 350 - 399: 10 Units of Inuslin,Blood Glucose greater than or equal to 400: 12 Units of Insulin-please contact your physician, Disp: 15 mL, Rfl: 0    Insulin Pen Needle (BD Pen Needle Pilar 2nd Gen) 32G X 4 MM MISC, For use with insulin pen  Pharmacy may dispense brand covered by insurance , Disp: 100 each, Rfl: 0    Insulin Pen Needle (NovoFine Autocover) 30G X 8 MM MISC, Inject under the skin daily, Disp: 100 each, Rfl: 3    Insulin Pen Needle 30G X 8 MM MISC, 2 (two) times a day, Disp: , Rfl:     Lancets (onetouch ultrasoft) lancets, Use once daily, Disp: 100 each, Rfl: 2    metoprolol tartrate (LOPRESSOR) 50 mg tablet, Take 1 tablet (50 mg total) by mouth every 12 (twelve) hours, Disp: 180 tablet, Rfl: 1    montelukast (SINGULAIR) 10 mg tablet, Take 10 mg by mouth daily  , Disp: , Rfl:     nystatin (MYCOSTATIN) powder, Apply topically 2 (two) times a day, Disp: , Rfl:     pregabalin (LYRICA) 100 mg capsule, Take 100 mg by mouth 2 (two) times a day , Disp: , Rfl:     rosuvastatin (CRESTOR) 10 MG tablet, 10 mg daily  , Disp: , Rfl: 0    sodium chloride (OCEAN) 0 65 % nasal spray, 1 spray into each nostril every hour as needed for congestion, Disp: , Rfl: 0    torsemide (DEMADEX) 20 mg tablet, Take 1 tablet (20 mg total) by mouth every morning, Disp: 90 tablet, Rfl: 1    Toujeo SoloStar 300 units/mL CONCENTRATED U-300 injection pen (1-unit dial), Inject 25 Units under the skin daily at bedtime, Disp: 4 5 mL, Rfl: 0    Trulicity 1 5 FK/3 2AB SOPN, inject 0 5 milliliter subcutaneously every week 28, Disp: , Rfl:       Objective:    Vitals:   Pulse 75   Ht 5' 2" (1 575 m)   Wt 114 kg (252 lb)   SpO2 97%   BMI 46 09 kg/m²   Body mass index is 46 09 kg/m²  Vitals:    05/26/22 1643   Weight: 114 kg (252 lb)       Physical Exam  Vitals reviewed  Exam conducted with a chaperone present  Constitutional:       General: She is not in acute distress  Appearance: She is well-developed  She is obese   She is not ill-appearing, toxic-appearing or diaphoretic  HENT:      Head: Normocephalic and atraumatic  Right Ear: External ear normal       Left Ear: External ear normal       Nose: No congestion or rhinorrhea  Eyes:      General: Lids are normal          Right eye: No discharge  Left eye: No discharge  Conjunctiva/sclera: Conjunctivae normal    Neck:      Thyroid: No thyroid mass or thyromegaly  Vascular: No carotid bruit or JVD  Trachea: Trachea normal    Cardiovascular:      Rate and Rhythm: Rhythm irregular  Heart sounds: Normal heart sounds  No murmur heard  Pulmonary:      Effort: No respiratory distress  Breath sounds: Normal breath sounds  No wheezing, rhonchi or rales  Abdominal:      General: Bowel sounds are normal  There is no distension  Palpations: There is no mass  Tenderness: There is no abdominal tenderness  There is no rebound  Musculoskeletal:      Right lower le+ Edema present  Left lower le+ Edema present  Lymphadenopathy:      Cervical: No cervical adenopathy  Skin:     General: Skin is warm  Coloration: Skin is not jaundiced or pale  Findings: No rash  Neurological:      Mental Status: She is alert  Coordination: Coordination normal       Gait: Gait abnormal    Psychiatric:         Mood and Affect: Mood normal          Behavior: Behavior normal          Thought Content: Thought content normal          Judgment: Judgment normal          Lab Review   No results displayed because visit has over 200 results        Admission on 2022, Discharged on 2022   Component Date Value Ref Range Status    WBC 2022 8 30  4 31 - 10 16 Thousand/uL Final    RBC 2022 3 30 (A) 3 81 - 5 12 Million/uL Final    Hemoglobin 2022 9 2 (A) 11 5 - 15 4 g/dL Final    Hematocrit 2022 31 4 (A) 34 8 - 46 1 % Final    MCV 2022 95  82 - 98 fL Final    MCH 2022 27 9  26 8 - 34 3 pg Final    MCHC 05/13/2022 29 3 (A) 31 4 - 37 4 g/dL Final    RDW 05/13/2022 14 3  11 6 - 15 1 % Final    MPV 05/13/2022 10 5  8 9 - 12 7 fL Final    Platelets 01/14/8104 174  149 - 390 Thousands/uL Final    nRBC 05/13/2022 0  /100 WBCs Final    Neutrophils Relative 05/13/2022 58  43 - 75 % Final    Immat GRANS % 05/13/2022 0  0 - 2 % Final    Lymphocytes Relative 05/13/2022 26  14 - 44 % Final    Monocytes Relative 05/13/2022 10  4 - 12 % Final    Eosinophils Relative 05/13/2022 5  0 - 6 % Final    Basophils Relative 05/13/2022 1  0 - 1 % Final    Neutrophils Absolute 05/13/2022 4 87  1 85 - 7 62 Thousands/µL Final    Immature Grans Absolute 05/13/2022 0 03  0 00 - 0 20 Thousand/uL Final    Lymphocytes Absolute 05/13/2022 2 12  0 60 - 4 47 Thousands/µL Final    Monocytes Absolute 05/13/2022 0 84  0 17 - 1 22 Thousand/µL Final    Eosinophils Absolute 05/13/2022 0 38  0 00 - 0 61 Thousand/µL Final    Basophils Absolute 05/13/2022 0 06  0 00 - 0 10 Thousands/µL Final    Protime 05/13/2022 14 8 (A) 11 6 - 14 5 seconds Final    INR 05/13/2022 1 18  0 84 - 1 19 Final    PTT 05/13/2022 33  23 - 37 seconds Final    Therapeutic Heparin Range =  60-90 seconds    Sodium 05/13/2022 141  136 - 145 mmol/L Final    Potassium 05/13/2022 4 4  3 5 - 5 3 mmol/L Final    Chloride 05/13/2022 99 (A) 100 - 108 mmol/L Final    CO2 05/13/2022 37 (A) 21 - 32 mmol/L Final    ANION GAP 05/13/2022 5  4 - 13 mmol/L Final    BUN 05/13/2022 36 (A) 5 - 25 mg/dL Final    Creatinine 05/13/2022 1 49 (A) 0 60 - 1 30 mg/dL Final    Standardized to IDMS reference method    Glucose 05/13/2022 282 (A) 65 - 140 mg/dL Final    If the patient is fasting, the ADA then defines impaired fasting glucose as > 100 mg/dL and diabetes as > or equal to 123 mg/dL  Specimen collection should occur prior to Sulfasalazine administration due to the potential for falsely depressed results   Specimen collection should occur prior to Sulfapyridine administration due to the potential for falsely elevated results   Calcium 05/13/2022 8 0 (A) 8 3 - 10 1 mg/dL Final    Corrected Calcium 05/13/2022 8 7  8 3 - 10 1 mg/dL Final    AST 05/13/2022 10  5 - 45 U/L Final    Specimen collection should occur prior to Sulfasalazine administration due to the potential for falsely depressed results   ALT 05/13/2022 15  12 - 78 U/L Final    Specimen collection should occur prior to Sulfasalazine administration due to the potential for falsely depressed results   Alkaline Phosphatase 05/13/2022 91  46 - 116 U/L Final    Total Protein 05/13/2022 7 4  6 4 - 8 2 g/dL Final    Albumin 05/13/2022 3 1 (A) 3 5 - 5 0 g/dL Final    Total Bilirubin 05/13/2022 0 19 (A) 0 20 - 1 00 mg/dL Final    Use of this assay is not recommended for patients undergoing treatment with eltrombopag due to the potential for falsely elevated results   eGFR 05/13/2022 35  ml/min/1 73sq m Final    Magnesium 05/13/2022 1 8  1 6 - 2 6 mg/dL Final    hs TnI 0hr 05/13/2022 9  "Refer to ACS Flowchart"- see link ng/L Final    Comment:                                              Initial (time 0) result  If >=50 ng/L, Myocardial injury suggested ;  Type of myocardial injury and treatment strategy  to be determined  If 5-49 ng/L, a delta result at 2 hours and or 4 hours will be needed to further evaluate  If <4 ng/L, and chest pain has been >3 hours since onset, patient may qualify for discharge based on the HEART score in the ED  If <5 ng/L and <3hours since onset of chest pain, a delta result at 2 hours will be needed to further evaluate  HS Troponin 99th Percentile URL of a Health Population=12 ng/L with a 95% Confidence Interval of 8-18 ng/L  Second Troponin (time 2 hours)  If calculated delta >= 20 ng/L,  Myocardial injury suggested ; Type of myocardial injury and treatment strategy to be determined    If 5-49 ng/L and the calculated delta is 5-19 ng/L, consult medical service for evaluation  Continue evaluation for ischemia on ecg and other possible etiology and repeat hs troponin at 4 hours  If delta                            is <5 ng/L at 2 hours, consider discharge based on risk stratification via the HEART score (if in ED), or VASILE risk score in IP/Observation  HS Troponin 99th Percentile URL of a Health Population=12 ng/L with a 95% Confidence Interval of 8-18 ng/L     NT-proBNP 05/13/2022 1,548 (A) <125 pg/mL Final    hs TnI 2hr 05/13/2022 10  "Refer to ACS Flowchart"- see link ng/L Final    Comment:                                              Initial (time 0) result  If >=50 ng/L, Myocardial injury suggested ;  Type of myocardial injury and treatment strategy  to be determined  If 5-49 ng/L, a delta result at 2 hours and or 4 hours will be needed to further evaluate  If <4 ng/L, and chest pain has been >3 hours since onset, patient may qualify for discharge based on the HEART score in the ED  If <5 ng/L and <3hours since onset of chest pain, a delta result at 2 hours will be needed to further evaluate  HS Troponin 99th Percentile URL of a Health Population=12 ng/L with a 95% Confidence Interval of 8-18 ng/L  Second Troponin (time 2 hours)  If calculated delta >= 20 ng/L,  Myocardial injury suggested ; Type of myocardial injury and treatment strategy to be determined  If 5-49 ng/L and the calculated delta is 5-19 ng/L, consult medical service for evaluation  Continue evaluation for ischemia on ecg and other possible etiology and repeat hs troponin at 4 hours  If delta                            is <5 ng/L at 2 hours, consider discharge based on risk stratification via the HEART score (if in ED), or VASILE risk score in IP/Observation  HS Troponin 99th Percentile URL of a Health Population=12 ng/L with a 95% Confidence Interval of 8-18 ng/L      Delta 2hr hsTnI 05/13/2022 1  <20 ng/L Final    Color, UA 05/13/2022 Yellow   Final    Clarity, UA 05/13/2022 Clear Final    Specific Gravity, UA 05/13/2022 1 025  1 000 - 1 030 Final    pH, UA 05/13/2022 5 5  5 0, 5 5, 6 0, 6 5, 7 0, 7 5, 8 0, 8 5, 9 0 Final    Leukocytes, UA 05/13/2022 Trace (A) Negative Final    Nitrite, UA 05/13/2022 Negative  Negative Final    Protein, UA 05/13/2022 30 (1+) (A) Negative mg/dl Final    Glucose, UA 05/13/2022 Negative  Negative mg/dl Final    Ketones, UA 05/13/2022 Negative  Negative mg/dl Final    Urobilinogen, UA 05/13/2022 0 2  0 2, 1 0 E U /dl E U /dl Final    Bilirubin, UA 05/13/2022 Negative  Negative Final    Blood, UA 05/13/2022 Trace-Intact (A) Negative Final    RBC, UA 05/13/2022 1-2  None Seen, 0-1, 1-2, 2-4, 0-5 /hpf Final    WBC, UA 05/13/2022 20-30 (A) None Seen, 0-1, 1-2, 0-5, 2-4 /hpf Final    Epithelial Cells 05/13/2022 Moderate (A) None Seen, Occasional /hpf Final    Bacteria, UA 05/13/2022 Occasional  None Seen, Occasional /hpf Final    MUCUS THREADS 05/13/2022 Occasional (A) None Seen Final    Urine Culture 05/13/2022 50,000-59,000 cfu/ml    Final    Mixed Contaminants X4    Ventricular Rate 05/13/2022 85  BPM Final    Atrial Rate 05/13/2022 85  BPM Final    WV Interval 05/13/2022 186  ms Final    QRSD Interval 05/13/2022 72  ms Final    QT Interval 05/13/2022 372  ms Final    QTC Interval 05/13/2022 442  ms Final    P Axis 05/13/2022 56  degrees Final    QRS Axis 05/13/2022 8  degrees Final    T Wave Axis 05/13/2022 27  degrees Final   Appointment on 04/12/2022   Component Date Value Ref Range Status    Urine Culture 04/12/2022 50,000-59,000 cfu/ml    Final    Mixed Contaminants X3   Lab on 04/12/2022   Component Date Value Ref Range Status    WBC 04/12/2022 8 74  4 31 - 10 16 Thousand/uL Final    RBC 04/12/2022 3 42 (A) 3 81 - 5 12 Million/uL Final    Hemoglobin 04/12/2022 9 6 (A) 11 5 - 15 4 g/dL Final    Hematocrit 04/12/2022 33 2 (A) 34 8 - 46 1 % Final    MCV 04/12/2022 97  82 - 98 fL Final    MCH 04/12/2022 28 1  26 8 - 34 3 pg Final  MCHC 04/12/2022 28 9 (A) 31 4 - 37 4 g/dL Final    RDW 04/12/2022 15 7 (A) 11 6 - 15 1 % Final    MPV 04/12/2022 10 8  8 9 - 12 7 fL Final    Platelets 02/41/1416 212  149 - 390 Thousands/uL Final    nRBC 04/12/2022 0  /100 WBCs Final    Neutrophils Relative 04/12/2022 60  43 - 75 % Final    Immat GRANS % 04/12/2022 1  0 - 2 % Final    Lymphocytes Relative 04/12/2022 23  14 - 44 % Final    Monocytes Relative 04/12/2022 9  4 - 12 % Final    Eosinophils Relative 04/12/2022 5  0 - 6 % Final    Basophils Relative 04/12/2022 2 (A) 0 - 1 % Final    Neutrophils Absolute 04/12/2022 5 28  1 85 - 7 62 Thousands/µL Final    Immature Grans Absolute 04/12/2022 0 06  0 00 - 0 20 Thousand/uL Final    Lymphocytes Absolute 04/12/2022 2 03  0 60 - 4 47 Thousands/µL Final    Monocytes Absolute 04/12/2022 0 80  0 17 - 1 22 Thousand/µL Final    Eosinophils Absolute 04/12/2022 0 43  0 00 - 0 61 Thousand/µL Final    Basophils Absolute 04/12/2022 0 14 (A) 0 00 - 0 10 Thousands/µL Final    Sodium 04/12/2022 137  136 - 145 mmol/L Final    Potassium 04/12/2022 5 1  3 5 - 5 3 mmol/L Final    Chloride 04/12/2022 104  100 - 108 mmol/L Final    CO2 04/12/2022 29  21 - 32 mmol/L Final    ANION GAP 04/12/2022 4  4 - 13 mmol/L Final    BUN 04/12/2022 24  5 - 25 mg/dL Final    Creatinine 04/12/2022 1 12  0 60 - 1 30 mg/dL Final    Standardized to IDMS reference method    Glucose 04/12/2022 195 (A) 65 - 140 mg/dL Final    If the patient is fasting, the ADA then defines impaired fasting glucose as > 100 mg/dL and diabetes as > or equal to 123 mg/dL  Specimen collection should occur prior to Sulfasalazine administration due to the potential for falsely depressed results  Specimen collection should occur prior to Sulfapyridine administration due to the potential for falsely elevated results      Calcium 04/12/2022 9 0  8 3 - 10 1 mg/dL Final    Corrected Calcium 04/12/2022 9 8  8 3 - 10 1 mg/dL Final    AST 04/12/2022 25  5 - 45 U/L Final    Specimen collection should occur prior to Sulfasalazine administration due to the potential for falsely depressed results   ALT 04/12/2022 18  12 - 78 U/L Final    Specimen collection should occur prior to Sulfasalazine and/or Sulfapyridine administration due to the potential for falsely depressed results   Alkaline Phosphatase 04/12/2022 76  46 - 116 U/L Final    Total Protein 04/12/2022 7 4  6 4 - 8 2 g/dL Final    Albumin 04/12/2022 3 0 (A) 3 5 - 5 0 g/dL Final    Total Bilirubin 04/12/2022 0 18 (A) 0 20 - 1 00 mg/dL Final    Use of this assay is not recommended for patients undergoing treatment with eltrombopag due to the potential for falsely elevated results      eGFR 04/12/2022 50  ml/min/1 73sq m Final   Orders Only on 04/12/2022   Component Date Value Ref Range Status    Color, UA 04/12/2022 Colorless   Final    Clarity, UA 04/12/2022 Clear   Final    Specific Gravity, UA 04/12/2022 1 009  1 003 - 1 030 Final    pH, UA 04/12/2022 5 5  4 5, 5 0, 5 5, 6 0, 6 5, 7 0, 7 5, 8 0 Final    Leukocytes, UA 04/12/2022 Negative  Negative Final    Nitrite, UA 04/12/2022 Negative  Negative Final    Protein, UA 04/12/2022 Negative  Negative mg/dl Final    Glucose, UA 04/12/2022 Negative  Negative mg/dl Final    Ketones, UA 04/12/2022 Negative  Negative mg/dl Final    Urobilinogen, UA 04/12/2022 <2 0  <2 0 mg/dl mg/dl Final    Bilirubin, UA 04/12/2022 Negative  Negative Final    Blood, UA 04/12/2022 Negative  Negative Final   Lab on 04/06/2022   Component Date Value Ref Range Status    Sodium 04/06/2022 141  136 - 145 mmol/L Final    Potassium 04/06/2022 4 8  3 5 - 5 3 mmol/L Final    Chloride 04/06/2022 104  100 - 108 mmol/L Final    CO2 04/06/2022 34 (A) 21 - 32 mmol/L Final    ANION GAP 04/06/2022 3 (A) 4 - 13 mmol/L Final    BUN 04/06/2022 25  5 - 25 mg/dL Final    Creatinine 04/06/2022 1 25  0 60 - 1 30 mg/dL Final    Standardized to IDMS reference method    Glucose 04/06/2022 173 (A) 65 - 140 mg/dL Final    If the patient is fasting, the ADA then defines impaired fasting glucose as > 100 mg/dL and diabetes as > or equal to 123 mg/dL  Specimen collection should occur prior to Sulfasalazine administration due to the potential for falsely depressed results  Specimen collection should occur prior to Sulfapyridine administration due to the potential for falsely elevated results   Calcium 04/06/2022 8 9  8 3 - 10 1 mg/dL Final    Corrected Calcium 04/06/2022 9 9  8 3 - 10 1 mg/dL Final    AST 04/06/2022 14  5 - 45 U/L Final    Specimen collection should occur prior to Sulfasalazine administration due to the potential for falsely depressed results   ALT 04/06/2022 15  12 - 78 U/L Final    Specimen collection should occur prior to Sulfasalazine and/or Sulfapyridine administration due to the potential for falsely depressed results   Alkaline Phosphatase 04/06/2022 69  46 - 116 U/L Final    Total Protein 04/06/2022 7 2  6 4 - 8 2 g/dL Final    Albumin 04/06/2022 2 8 (A) 3 5 - 5 0 g/dL Final    Total Bilirubin 04/06/2022 0 40  0 20 - 1 00 mg/dL Final    Use of this assay is not recommended for patients undergoing treatment with eltrombopag due to the potential for falsely elevated results      eGFR 04/06/2022 43  ml/min/1 73sq m Final    WBC 04/06/2022 8 60  4 31 - 10 16 Thousand/uL Final    RBC 04/06/2022 3 19 (A) 3 81 - 5 12 Million/uL Final    Hemoglobin 04/06/2022 8 9 (A) 11 5 - 15 4 g/dL Final    Hematocrit 04/06/2022 31 2 (A) 34 8 - 46 1 % Final    MCV 04/06/2022 98  82 - 98 fL Final    MCH 04/06/2022 27 9  26 8 - 34 3 pg Final    MCHC 04/06/2022 28 5 (A) 31 4 - 37 4 g/dL Final    RDW 04/06/2022 15 5 (A) 11 6 - 15 1 % Final    MPV 04/06/2022 10 7  8 9 - 12 7 fL Final    Platelets 84/70/6447 330  149 - 390 Thousands/uL Final    Segmented % 04/06/2022 26 (A) 43 - 75 % Final    Bands % 04/06/2022 11 (A) 0 - 8 % Final    Lymphocytes % 04/06/2022 39  14 - 44 % Final    Monocytes % 04/06/2022 9  4 - 12 % Final    Eosinophils, % 04/06/2022 11 (A) 0 - 6 % Final    Basophils % 04/06/2022 0  0 - 1 % Final    Atypical Lymphocytes % 04/06/2022 4 (A) <=0 % Final    Absolute Neutrophils 04/06/2022 3 18  1 85 - 7 62 Thousand/uL Final    Lymphocytes Absolute 04/06/2022 3 35  0 60 - 4 47 Thousand/uL Final    Monocytes Absolute 04/06/2022 0 77  0 00 - 1 22 Thousand/uL Final    Eosinophils Absolute 04/06/2022 0 95 (A) 0 00 - 0 40 Thousand/uL Final    Basophils Absolute 04/06/2022 0 00  0 00 - 0 10 Thousand/uL Final    Toxic Granulation 04/06/2022 Present   Final    RBC Morphology 04/06/2022 Present   Final    Anisocytosis 04/06/2022 Present   Final    Polychromasia 04/06/2022 Present   Final    Platelet Estimate 76/66/9719 Adequate  Adequate Final    Artifact 04/06/2022 Present   Final   No results displayed because visit has over 200 results        Admission on 03/27/2022, Discharged on 03/28/2022   Component Date Value Ref Range Status    WBC 03/27/2022 13 48 (A) 4 31 - 10 16 Thousand/uL Final    RBC 03/27/2022 3 12 (A) 3 81 - 5 12 Million/uL Final    Hemoglobin 03/27/2022 8 9 (A) 11 5 - 15 4 g/dL Final    Hematocrit 03/27/2022 29 9 (A) 34 8 - 46 1 % Final    MCV 03/27/2022 96  82 - 98 fL Final    MCH 03/27/2022 28 5  26 8 - 34 3 pg Final    MCHC 03/27/2022 29 8 (A) 31 4 - 37 4 g/dL Final    RDW 03/27/2022 15 7 (A) 11 6 - 15 1 % Final    MPV 03/27/2022 11 7  8 9 - 12 7 fL Final    Platelets 96/24/9354 155  149 - 390 Thousands/uL Final    nRBC 03/27/2022 0  /100 WBCs Final    Neutrophils Relative 03/27/2022 79 (A) 43 - 75 % Final    Immat GRANS % 03/27/2022 0  0 - 2 % Final    Lymphocytes Relative 03/27/2022 13 (A) 14 - 44 % Final    Monocytes Relative 03/27/2022 8  4 - 12 % Final    Eosinophils Relative 03/27/2022 0  0 - 6 % Final    Basophils Relative 03/27/2022 0  0 - 1 % Final    Neutrophils Absolute 03/27/2022 10 45 (A) 1 85 - 7 62 Thousands/µL Final    Immature Grans Absolute 03/27/2022 0 06  0 00 - 0 20 Thousand/uL Final    Lymphocytes Absolute 03/27/2022 1 76  0 60 - 4 47 Thousands/µL Final    Monocytes Absolute 03/27/2022 1 12  0 17 - 1 22 Thousand/µL Final    Eosinophils Absolute 03/27/2022 0 05  0 00 - 0 61 Thousand/µL Final    Basophils Absolute 03/27/2022 0 04  0 00 - 0 10 Thousands/µL Final    Sodium 03/27/2022 136  136 - 145 mmol/L Final    Potassium 03/27/2022 4 1  3 5 - 5 3 mmol/L Final    Chloride 03/27/2022 96 (A) 100 - 108 mmol/L Final    CO2 03/27/2022 35 (A) 21 - 32 mmol/L Final    ANION GAP 03/27/2022 5  4 - 13 mmol/L Final    BUN 03/27/2022 31 (A) 5 - 25 mg/dL Final    Creatinine 03/27/2022 1 86 (A) 0 60 - 1 30 mg/dL Final    Standardized to IDMS reference method    Glucose 03/27/2022 358 (A) 65 - 140 mg/dL Final    If the patient is fasting, the ADA then defines impaired fasting glucose as > 100 mg/dL and diabetes as > or equal to 123 mg/dL  Specimen collection should occur prior to Sulfasalazine administration due to the potential for falsely depressed results  Specimen collection should occur prior to Sulfapyridine administration due to the potential for falsely elevated results   Calcium 03/27/2022 7 8 (A) 8 3 - 10 1 mg/dL Final    Corrected Calcium 03/27/2022 8 8  8 3 - 10 1 mg/dL Final    AST 03/27/2022 12  5 - 45 U/L Final    Specimen collection should occur prior to Sulfasalazine administration due to the potential for falsely depressed results   ALT 03/27/2022 20  12 - 78 U/L Final    Specimen collection should occur prior to Sulfasalazine administration due to the potential for falsely depressed results       Alkaline Phosphatase 03/27/2022 90  46 - 116 U/L Final    Total Protein 03/27/2022 6 5  6 4 - 8 2 g/dL Final    Albumin 03/27/2022 2 8 (A) 3 5 - 5 0 g/dL Final    Total Bilirubin 03/27/2022 0 38  0 20 - 1 00 mg/dL Final    Use of this assay is not recommended for patients undergoing treatment with eltrombopag due to the potential for falsely elevated results   eGFR 03/27/2022 27  ml/min/1 73sq m Final    SARS-CoV-2 03/27/2022 Negative  Negative Final    INFLUENZA A PCR 03/27/2022 Negative  Negative Final    INFLUENZA B PCR 03/27/2022 Negative  Negative Final    RSV PCR 03/27/2022 Negative  Negative Final    Color, UA 03/28/2022 Yellow   Final    Clarity, UA 03/28/2022 Slightly Cloudy   Final    Specific [place], UA 03/28/2022 1 020  1 000 - 1 030 Final    pH, UA 03/28/2022 5 0  5 0, 5 5, 6 0, 6 5, 7 0, 7 5, 8 0, 8 5, 9 0 Final    Leukocytes, UA 03/28/2022 Small (A) Negative Final    Nitrite, UA 03/28/2022 Negative  Negative Final    Protein, UA 03/28/2022 30 (1+) (A) Negative mg/dl Final    Glucose, UA 03/28/2022 100 (1/10%) (A) Negative mg/dl Final    Ketones, UA 03/28/2022 Negative  Negative mg/dl Final    Urobilinogen, UA 03/28/2022 0 2  0 2, 1 0 E U /dl E U /dl Final    Bilirubin, UA 03/28/2022 Negative  Negative Final    Blood, UA 03/28/2022 Trace-Intact (A) Negative Final    Magnesium 03/27/2022 1 9  1 6 - 2 6 mg/dL Final    NT-proBNP 03/27/2022 3,576 (A) <125 pg/mL Final    hs TnI 0hr 03/27/2022 12  "Refer to ACS Flowchart"- see link ng/L Final    Comment:                                              Initial (time 0) result  If >=50 ng/L, Myocardial injury suggested ;  Type of myocardial injury and treatment strategy  to be determined  If 5-49 ng/L, a delta result at 2 hours and or 4 hours will be needed to further evaluate  If <4 ng/L, and chest pain has been >3 hours since onset, patient may qualify for discharge based on the HEART score in the ED  If <5 ng/L and <3hours since onset of chest pain, a delta result at 2 hours will be needed to further evaluate  HS Troponin 99th Percentile URL of a Health Population=12 ng/L with a 95% Confidence Interval of 8-18 ng/L      Second Troponin (time 2 hours)  If calculated delta >= 20 ng/L,  Myocardial injury suggested ; Type of myocardial injury and treatment strategy to be determined  If 5-49 ng/L and the calculated delta is 5-19 ng/L, consult medical service for evaluation  Continue evaluation for ischemia on ecg and other possible etiology and repeat hs troponin at 4 hours  If delta                            is <5 ng/L at 2 hours, consider discharge based on risk stratification via the HEART score (if in ED), or VASILE risk score in IP/Observation  HS Troponin 99th Percentile URL of a Health Population=12 ng/L with a 95% Confidence Interval of 8-18 ng/L     LACTIC ACID 03/27/2022 1 4  0 5 - 2 0 mmol/L Final    Blood Culture 03/27/2022 No Growth After 5 Days  Final    Blood Culture 03/28/2022 No Growth After 5 Days  Final    Protime 03/27/2022 19 2 (A) 11 6 - 14 5 seconds Final    INR 03/27/2022 1 67 (A) 0 84 - 1 19 Final    PTT 03/27/2022 41 (A) 23 - 37 seconds Final    Therapeutic Heparin Range =  60-90 seconds    POC Glucose 03/27/2022 360 (A) 65 - 140 mg/dl Final    hs TnI 2hr 03/28/2022 13  "Refer to ACS Flowchart"- see link ng/L Final    Comment:                                              Initial (time 0) result  If >=50 ng/L, Myocardial injury suggested ;  Type of myocardial injury and treatment strategy  to be determined  If 5-49 ng/L, a delta result at 2 hours and or 4 hours will be needed to further evaluate  If <4 ng/L, and chest pain has been >3 hours since onset, patient may qualify for discharge based on the HEART score in the ED  If <5 ng/L and <3hours since onset of chest pain, a delta result at 2 hours will be needed to further evaluate  HS Troponin 99th Percentile URL of a Health Population=12 ng/L with a 95% Confidence Interval of 8-18 ng/L  Second Troponin (time 2 hours)  If calculated delta >= 20 ng/L,  Myocardial injury suggested ; Type of myocardial injury and treatment strategy to be determined    If 5-49 ng/L and the calculated delta is 5-19 ng/L, consult medical service for evaluation  Continue evaluation for ischemia on ecg and other possible etiology and repeat hs troponin at 4 hours  If delta                            is <5 ng/L at 2 hours, consider discharge based on risk stratification via the HEART score (if in ED), or VASILE risk score in IP/Observation  HS Troponin 99th Percentile URL of a Health Population=12 ng/L with a 95% Confidence Interval of 8-18 ng/L   Delta 2hr hsTnI 03/28/2022 1  <20 ng/L Final    RBC, UA 03/28/2022 2-4  None Seen, 0-1, 1-2, 2-4, 0-5 /hpf Final    WBC, UA 03/28/2022 30-50 (A) None Seen, 0-1, 1-2, 0-5, 2-4 /hpf Final    Epithelial Cells 03/28/2022 Occasional  None Seen, Occasional /hpf Final    Bacteria, UA 03/28/2022 Innumerable (A) None Seen, Occasional /hpf Final    Hyaline Casts, UA 03/28/2022 2-4 (A) (none) /lpf Final    Fine granular casts 03/28/2022 1-2  /lpf Final    WBC Clumps 03/28/2022 Occasional   Final    OTHER OBSERVATIONS 03/28/2022 Transitional Epithelial Cells   Final    Urine Culture 03/28/2022 50,000-59,000 cfu/ml    Final    Mixed Contaminants X4    Ventricular Rate 03/27/2022 98  BPM Final    Atrial Rate 03/27/2022 98  BPM Final    MO Interval 03/27/2022 150  ms Final    QRSD Interval 03/27/2022 74  ms Final    QT Interval 03/27/2022 352  ms Final    QTC Interval 03/27/2022 449  ms Final    P Axis 03/27/2022 19  degrees Final    QRS Axis 03/27/2022 -26  degrees Final    T Wave [place] 03/27/2022 20  degrees Final         Patient Instructions   Patient's dry discharge weight at home was 244 lb  Today patient reports weight is 248 lb  Of    The patient currently is on torsemide 20 mg daily  Recommend if the weight is more than 248 lb to take extra torsemide  Patient is advised to check this regimen with cardiologist of  Dr Baltazar Ureña MD  [facility]       "This note has been constructed using a voice recognition system  Therefore there may be syntax, spelling, and/or grammatical errors   Please call if you have any questions  "

## 2022-05-31 ENCOUNTER — HOSPITAL ENCOUNTER (EMERGENCY)
Facility: HOSPITAL | Age: 72
Discharge: HOME | End: 2022-05-31
Attending: EMERGENCY MEDICINE
Payer: MEDICARE

## 2022-05-31 ENCOUNTER — APPOINTMENT (EMERGENCY)
Dept: RADIOLOGY | Facility: HOSPITAL | Age: 72
End: 2022-05-31
Payer: MEDICARE

## 2022-05-31 ENCOUNTER — APPOINTMENT (OUTPATIENT)
Dept: LAB | Facility: CLINIC | Age: 72
End: 2022-05-31
Payer: MEDICARE

## 2022-05-31 ENCOUNTER — TELEPHONE (OUTPATIENT)
Dept: CARDIOLOGY CLINIC | Facility: CLINIC | Age: 72
End: 2022-05-31

## 2022-05-31 ENCOUNTER — TELEPHONE (OUTPATIENT)
Dept: INTERNAL MEDICINE CLINIC | Facility: CLINIC | Age: 72
End: 2022-05-31

## 2022-05-31 VITALS
WEIGHT: 255 LBS | DIASTOLIC BLOOD PRESSURE: 68 MMHG | BODY MASS INDEX: 46.64 KG/M2 | HEART RATE: 68 BPM | RESPIRATION RATE: 22 BRPM | SYSTOLIC BLOOD PRESSURE: 152 MMHG | TEMPERATURE: 98 F | OXYGEN SATURATION: 90 %

## 2022-05-31 DIAGNOSIS — I10 ESSENTIAL HYPERTENSION: ICD-10-CM

## 2022-05-31 DIAGNOSIS — I50.32 CHRONIC DIASTOLIC CONGESTIVE HEART FAILURE (HCC): Primary | ICD-10-CM

## 2022-05-31 DIAGNOSIS — N18.31 STAGE 3A CHRONIC KIDNEY DISEASE (HCC): ICD-10-CM

## 2022-05-31 DIAGNOSIS — R06.00 DYSPNEA: ICD-10-CM

## 2022-05-31 DIAGNOSIS — I48.91 ATRIAL FIBRILLATION WITH RAPID VENTRICULAR RESPONSE (HCC): ICD-10-CM

## 2022-05-31 DIAGNOSIS — I50.32 CHRONIC DIASTOLIC (CONGESTIVE) HEART FAILURE (HCC): ICD-10-CM

## 2022-05-31 DIAGNOSIS — I50.32 CHRONIC DIASTOLIC CONGESTIVE HEART FAILURE (HCC): ICD-10-CM

## 2022-05-31 DIAGNOSIS — E11.00 TYPE 2 DIABETES MELLITUS WITH HYPEROSMOLARITY WITHOUT COMA, WITHOUT LONG-TERM CURRENT USE OF INSULIN (HCC): ICD-10-CM

## 2022-05-31 LAB
ALBUMIN SERPL BCP-MCNC: 3.1 G/DL (ref 3.5–5)
ALBUMIN SERPL BCP-MCNC: 3.3 G/DL (ref 3.5–5)
ALP SERPL-CCNC: 64 U/L (ref 46–116)
ALP SERPL-CCNC: 73 U/L (ref 46–116)
ALT SERPL W P-5'-P-CCNC: 20 U/L (ref 12–78)
ALT SERPL W P-5'-P-CCNC: 21 U/L (ref 12–78)
ANION GAP SERPL CALCULATED.3IONS-SCNC: 5 MMOL/L (ref 4–13)
ANION GAP SERPL CALCULATED.3IONS-SCNC: 7 MMOL/L (ref 4–13)
AST SERPL W P-5'-P-CCNC: 27 U/L (ref 5–45)
AST SERPL W P-5'-P-CCNC: 31 U/L (ref 5–45)
ATRIAL RATE: 69 BPM
BILIRUB SERPL-MCNC: 0.26 MG/DL (ref 0.2–1)
BILIRUB SERPL-MCNC: 0.3 MG/DL (ref 0.2–1)
BUN SERPL-MCNC: 59 MG/DL (ref 5–25)
BUN SERPL-MCNC: 64 MG/DL (ref 5–25)
CALCIUM ALBUM COR SERPL-MCNC: 9 MG/DL (ref 8.3–10.1)
CALCIUM ALBUM COR SERPL-MCNC: 9.8 MG/DL (ref 8.3–10.1)
CALCIUM SERPL-MCNC: 8.3 MG/DL (ref 8.3–10.1)
CALCIUM SERPL-MCNC: 9.2 MG/DL (ref 8.3–10.1)
CARDIAC TROPONIN I PNL SERPL HS: 8 NG/L
CHLORIDE SERPL-SCNC: 102 MMOL/L (ref 100–108)
CHLORIDE SERPL-SCNC: 104 MMOL/L (ref 100–108)
CO2 SERPL-SCNC: 25 MMOL/L (ref 21–32)
CO2 SERPL-SCNC: 31 MMOL/L (ref 21–32)
CREAT SERPL-MCNC: 1.79 MG/DL (ref 0.6–1.3)
CREAT SERPL-MCNC: 1.9 MG/DL (ref 0.6–1.3)
ERYTHROCYTE [DISTWIDTH] IN BLOOD BY AUTOMATED COUNT: 14.5 % (ref 11.6–15.1)
GFR SERPL CREATININE-BSD FRML MDRD: 26 ML/MIN/1.73SQ M
GFR SERPL CREATININE-BSD FRML MDRD: 28 ML/MIN/1.73SQ M
GLUCOSE SERPL-MCNC: 241 MG/DL (ref 65–140)
GLUCOSE SERPL-MCNC: 246 MG/DL (ref 65–140)
HCT VFR BLD AUTO: 35.2 % (ref 34.8–46.1)
HGB BLD-MCNC: 10.3 G/DL (ref 11.5–15.4)
MCH RBC QN AUTO: 28.1 PG (ref 26.8–34.3)
MCHC RBC AUTO-ENTMCNC: 29.3 G/DL (ref 31.4–37.4)
MCV RBC AUTO: 96 FL (ref 82–98)
NT-PROBNP SERPL-MCNC: 1859 PG/ML
P AXIS: 5 DEGREES
PLATELET # BLD AUTO: 195 THOUSANDS/UL (ref 149–390)
PMV BLD AUTO: 11.9 FL (ref 8.9–12.7)
POTASSIUM SERPL-SCNC: 5.3 MMOL/L (ref 3.5–5.3)
POTASSIUM SERPL-SCNC: 5.3 MMOL/L (ref 3.5–5.3)
PR INTERVAL: 192 MS
PROT SERPL-MCNC: 7.3 G/DL (ref 6.4–8.2)
PROT SERPL-MCNC: 7.8 G/DL (ref 6.4–8.2)
QRS AXIS: -18 DEGREES
QRSD INTERVAL: 80 MS
QT INTERVAL: 400 MS
QTC INTERVAL: 428 MS
RBC # BLD AUTO: 3.67 MILLION/UL (ref 3.81–5.12)
SODIUM SERPL-SCNC: 136 MMOL/L (ref 136–145)
SODIUM SERPL-SCNC: 138 MMOL/L (ref 136–145)
T WAVE AXIS: 24 DEGREES
VENTRICULAR RATE: 69 BPM
WBC # BLD AUTO: 7.28 THOUSAND/UL (ref 4.31–10.16)

## 2022-05-31 PROCEDURE — 84484 ASSAY OF TROPONIN QUANT: CPT | Performed by: EMERGENCY MEDICINE

## 2022-05-31 PROCEDURE — 96374 THER/PROPH/DIAG INJ IV PUSH: CPT

## 2022-05-31 PROCEDURE — 93005 ELECTROCARDIOGRAM TRACING: CPT

## 2022-05-31 PROCEDURE — 99283 EMERGENCY DEPT VISIT LOW MDM: CPT | Performed by: INTERNAL MEDICINE

## 2022-05-31 PROCEDURE — 99285 EMERGENCY DEPT VISIT HI MDM: CPT

## 2022-05-31 PROCEDURE — 80053 COMPREHEN METABOLIC PANEL: CPT | Performed by: EMERGENCY MEDICINE

## 2022-05-31 PROCEDURE — 99285 EMERGENCY DEPT VISIT HI MDM: CPT | Performed by: EMERGENCY MEDICINE

## 2022-05-31 PROCEDURE — 36415 COLL VENOUS BLD VENIPUNCTURE: CPT

## 2022-05-31 PROCEDURE — 93010 ELECTROCARDIOGRAM REPORT: CPT | Performed by: INTERNAL MEDICINE

## 2022-05-31 PROCEDURE — 85027 COMPLETE CBC AUTOMATED: CPT

## 2022-05-31 PROCEDURE — 83880 ASSAY OF NATRIURETIC PEPTIDE: CPT | Performed by: EMERGENCY MEDICINE

## 2022-05-31 PROCEDURE — 71045 X-RAY EXAM CHEST 1 VIEW: CPT

## 2022-05-31 RX ORDER — FUROSEMIDE 10 MG/ML
40 INJECTION INTRAMUSCULAR; INTRAVENOUS ONCE
Status: COMPLETED | OUTPATIENT
Start: 2022-05-31 | End: 2022-05-31

## 2022-05-31 RX ADMIN — FUROSEMIDE 40 MG: 10 INJECTION, SOLUTION INTRAMUSCULAR; INTRAVENOUS at 15:32

## 2022-05-31 NOTE — TELEPHONE ENCOUNTER
Spoke with [facility] [place] [facility] cardiology and pt's caretaker Francisco J Bojorquez will see pt in ED at [facility]  Pt to go to ED, get checked in  They will start w/u and DR Cedrick Bojorquez will consult there  He has agreed and pt is agreeable

## 2022-05-31 NOTE — CONSULTS
ER CARDIOLOGY CONSULTATION  Sarahy Morillo 73 y o  female MRN: [id number]  Unit/Bed#: ED 03 Encounter: [id number]      History of Present Illness   PCP: Jaqui Barriga MD  Date of Admission:  5/31/2022  Date of Consultation: 05/31/22  Physician Requesting Consult: Nelida Keane MD    Reason for Consult / Principal Problem:  Weight gain    Assessment/Plan   Acute on chronic diastolic heart failure in the setting of chronic kidney disease and morbid obesity- compared to her prior weight 2 weeks ago she is 3 lb up  She reports having poor response to p o  40 mg torsemide  We will have her increase to 60 mg p o  torsemide with repeat BMP in 5 days  She reports having breakthrough AFib episodes at night  Patient reports being noncompliant to CPAP  She is unable to tolerate  I discussed with patient the need for compliance  We were unable to place on ST LT 2 secondary to her kidney dysfunction  She is unable to be placed on Entresto secondary to her kidney dysfunction  We will have her repeat a BMP in 1 week  She will see us in the office in 1 week    Obstructive sleep apnea noncompliant to CPAP- patient has follow-up with sleep medicine  Discussed about weight loss  She will elevate her bed  She has a hospital bed at home  She will try sleep prior sides  She wears oxygen at night    SVT/paroxysmal atrial fibrillation- likely with breakthrough event secondary to noncompliance to CPAP  Discussed compliance with patient  Continue Eliquis 5 mg twice a day  If with continued breakthrough events consideration for amiodarone  I discussed the poor long-term affects of untreated sleep apnea    She would be high risk for recurrence of atrial fibrillation with ablation if she is noncompliant to CPAP    Morbid obesity- weight loss    Diabetes mellitus- tight control    Chronic kidney disease- monitor with increased diuretic      HPI: Sarahy Morillo is a 73y o  year old female who presents to ER with an episode of AFib at night  Patient reports not being able to tolerate her CPAP  She woke up in rapid AFib  She had a 3 lb weight gain in 1 day  Given the patient's previous heart failure hospitalization she was concern for reaccumulation  She denies having chest heaviness  She is compliant with her medications  However she is unable to tolerate her CPAP  She has pending follow-up with sleep medicine  She denies having fevers or chills  She denies having active chest pain        Historical Information   Past Medical History:   Diagnosis Date    Anemia     Asthma     COVID-19     January 2022    GERD (gastroesophageal reflux disease)     Hyperlipidemia     Kidney stones     PONV (postoperative nausea and vomiting)     SVT (supraventricular tachycardia) (HCC)      Past Surgical History:   Procedure Laterality Date    APPENDECTOMY      CYSTOSCOPY W/ LASER LITHOTRIPSY Left 7/12/2016    Procedure: CYSTOSCOPY URETEROSCOPY WITH LITHOTRIPSY HOLMIUM LASER, RETROGRADE PYELOGRAM AND INSERTION STENT URETERAL;  Surgeon: Griselda Albe, MD;  Location: [address] [place] [address];  Service:     DILATION AND CURETTAGE OF UTERUS      IR THORACENTESIS  3/18/2022    JOINT REPLACEMENT      right knee    KNEE ARTHROPLASTY Right     NV CYSTOURETHROSCOPY,URETER CATHETER Left 6/29/2016    Procedure: CYSTOSCOPY RETROGRADE PYELOGRAM WITH INSERTION STENT URETERAL, left;  Surgeon: Griselda Albe, MD;  Location: [address] [place] [address];  Service: Urology    NV RECONSTR TOTAL SHOULDER IMPLANT Left 3/1/2022    Procedure: ARTHROPLASTY SHOULDER REVERSE;  Surgeon: Praveen Benites MD;  Location: [facility];  Service: Orthopedics    SHOULDER ARTHROTOMY Left      Social History     Substance and Sexual Activity   Alcohol Use Not Currently    Comment: rarely     Social History     Substance and Sexual Activity   Drug Use No     Social History     Tobacco Use   Smoking Status Former Smoker    Packs/day: 1 00    Years: 20 00    Pack years: 20 00    Types: Cigarettes    Quit date: 1996    Years since quittin 9   Smokeless Tobacco Never Used     Family History   Problem Relation Age of Onset    Heart disease Mother     Emphysema Maternal Grandmother     Heart disease Family        Meds/Allergies   Prior to Admission medications    Medication Sig Start Date End Date Taking? Authorizing Provider   acetaminophen (TYLENOL) 325 mg tablet Take 650 mg by mouth every 6 (six) hours as needed for mild pain      Historical Provider, MD   albuterol (PROVENTIL HFA,VENTOLIN HFA) 90 mcg/act inhaler Inhale 2 puffs every 6 (six) hours as needed for wheezing 3/25/22   Marko Davidson MD   ammonium lactate (LAC-HYDRIN) 12 % lotion APPLY TOPICALLY TO AFFECTED AREAS twice a day 21   Historical Provider, MD   apixaban (ELIQUIS) 5 mg Take 1 tablet (5 mg total) by mouth in the morning and 1 tablet (5 mg total) in the evening  22   Carol Driver MD   b complex-vitamin C-folic acid (NEPHROCAPS) 1 mg capsule Take 1 capsule by mouth daily 5/3/22 6/2/22  Marko Davidson MD   docusate sodium (COLACE) 100 mg capsule Take 100 mg by mouth in the morning    Historical Provider, MD   glucose blood (OneTouch Ultra) test strip Test twice daily  22   Marko Davidson MD   insulin lispro (HumaLOG KwikPen) 100 units/mL injection pen 3 times with meal according to sliding scale - >Blood Glucose 150 - 199: 2 Unit of Insulin, Blood Glucose 200 - 249: 4 Units of Insulin, Blood Glucose 250 - 299: 6 Units of Insulin, Blood Glucose 300 - 349: 8 Units of Insulin, Blood Glucose 350 - 399: 10 Units of Inuslin,Blood Glucose greater than or equal to 400: 12 Units of Insulin-please contact your physician 22   Keiry Pleitez MD   Insulin Pen Needle (BD Pen Needle Pilar 2nd Gen) 32G X 4 MM MISC For use with insulin pen  Pharmacy may dispense brand covered by insurance   22   Keiry Pleitez MD   Insulin Pen Needle (NovoFine Autocover) 30G X 8 MM MISC Inject under the skin daily 4/22/22   Greer June MD   Insulin Pen Needle 30G X 8 MM MISC 2 (two) times a day    Historical Provider, MD   Lancets (onetouch ultrasoft) lancets Use once daily 4/8/22   Greer June MD   metoprolol tartrate (LOPRESSOR) 50 mg tablet Take 1 tablet (50 mg total) by mouth every 12 (twelve) hours 5/24/22   Tor Lerner MD   montelukast (SINGULAIR) 10 mg tablet Take 10 mg by mouth daily      Historical Provider, MD   nystatin (MYCOSTATIN) powder Apply topically 2 (two) times a day    Historical Provider, MD   pregabalin (LYRICA) 100 mg capsule Take 100 mg by mouth 2 (two) times a day     Historical Provider, MD   rosuvastatin (CRESTOR) 10 MG tablet 10 mg daily   8/29/19   Historical Provider, MD   sodium chloride (OCEAN) 0 65 % nasal spray 1 spray into each nostril every hour as needed for congestion 5/23/22   Jaxon Nieves MD   torsemide (DEMADEX) 20 mg tablet Take 1 tablet (20 mg total) by mouth every morning 5/24/22 6/23/22  Tor Lerner MD   Toujeo SoloStar 300 units/mL CONCENTRATED U-300 injection pen (1-unit dial) Inject 25 Units under the skin daily at bedtime 5/23/22   Jaxon Nieves MD   Trulicity 1 5 ZN/0 7IH SOPN inject 0 5 milliliter subcutaneously every week 28 12/16/21   Historical Provider, MD     No current facility-administered medications for this encounter  Current Outpatient Medications   Medication Sig Dispense Refill    acetaminophen (TYLENOL) 325 mg tablet Take 650 mg by mouth every 6 (six) hours as needed for mild pain        albuterol (PROVENTIL HFA,VENTOLIN HFA) 90 mcg/act inhaler Inhale 2 puffs every 6 (six) hours as needed for wheezing 8 g 1    ammonium lactate (LAC-HYDRIN) 12 % lotion APPLY TOPICALLY TO AFFECTED AREAS twice a day      apixaban (ELIQUIS) 5 mg Take 1 tablet (5 mg total) by mouth in the morning and 1 tablet (5 mg total) in the evening   180 tablet 1    b complex-vitamin C-folic acid (NEPHROCAPS) 1 mg capsule Take 1 capsule by mouth daily 30 capsule 5    docusate sodium (COLACE) 100 mg capsule Take 100 mg by mouth in the morning      glucose blood (OneTouch Ultra) test strip Test twice daily  100 strip 2    insulin lispro (HumaLOG KwikPen) 100 units/mL injection pen 3 times with meal according to sliding scale - >Blood Glucose 150 - 199: 2 Unit of Insulin, Blood Glucose 200 - 249: 4 Units of Insulin, Blood Glucose 250 - 299: 6 Units of Insulin, Blood Glucose 300 - 349: 8 Units of Insulin, Blood Glucose 350 - 399: 10 Units of Inuslin,Blood Glucose greater than or equal to 400: 12 Units of Insulin-please contact your physician 15 mL 0    Insulin Pen Needle (BD Pen Needle Pilar 2nd Gen) 32G X 4 MM MISC For use with insulin pen  Pharmacy may dispense brand covered by insurance   100 each 0    Insulin Pen Needle (NovoFine Autocover) 30G X 8 MM MISC Inject under the skin daily 100 each 3    Insulin Pen Needle 30G X 8 MM MISC 2 (two) times a day      Lancets (onetouch ultrasoft) lancets Use once daily 100 each 2    metoprolol tartrate (LOPRESSOR) 50 mg tablet Take 1 tablet (50 mg total) by mouth every 12 (twelve) hours 180 tablet 1    montelukast (SINGULAIR) 10 mg tablet Take 10 mg by mouth daily        nystatin (MYCOSTATIN) powder Apply topically 2 (two) times a day      pregabalin (LYRICA) 100 mg capsule Take 100 mg by mouth 2 (two) times a day       rosuvastatin (CRESTOR) 10 MG tablet 10 mg daily    0    sodium chloride (OCEAN) 0 65 % nasal spray 1 spray into each nostril every hour as needed for congestion  0    torsemide (DEMADEX) 20 mg tablet Take 1 tablet (20 mg total) by mouth every morning 90 tablet 1    Toujeo SoloStar 300 units/mL CONCENTRATED U-300 injection pen (1-unit dial) Inject 25 Units under the skin daily at bedtime 4 5 mL 0    Trulicity 1 5 MM/7 5FM SOPN inject 0 5 milliliter subcutaneously every week 28       Allergies   Allergen Reactions    Penicillins Hives    Moxifloxacin Other (See Comments)     unknown    Percocet [Oxycodone-Acetaminophen] Other (See Comments)     unknown    Zinc Acetate Other (See Comments)     unknown    Asa [Aspirin] GI Intolerance    Indocin [Indomethacin] Other (See Comments)     Made patient "loopy"    Other Other (See Comments)     unknown       Review of systems  CONSTITUTIONAL:  As per hpi  HEENT:  Eyes:  No visual loss, blurred vision, double vision or yellow sclerae  Ears, Nose, Throat:  No hearing loss, sneezing, congestion, runny nose or sore throat  SKIN:  No rash or itching  CARDIOVASCULAR:  As per hpi  RESPIRATORY:  As per hpi  GASTROINTESTINAL:  No anorexia, nausea, vomiting or diarrhea  No abdominal pain or blood  GENITOURINARY:  Burning on urination  No flank pain  NEUROLOGICAL:  No headache, dizziness, syncope, paralysis, ataxia, numbness or tingling in the extremities  No change in bowel or bladder control  MUSCULOSKELETAL:  No muscle, back pain, joint pain or stiffness  HEMATOLOGIC:  No anemia, bleeding or bruising  LYMPHATICS:  No enlarged nodes  No history of splenectomy  PSYCHIATRIC:  No active suicidal or homicidal ideation  ENDOCRINOLOGIC:  No reports of sweating, cold or heat intolerance  No polyuria or polydipsia  ALLERGIES:  No history of asthma, hives, eczema or rhinitis  More than 10 systems reviewed and otherwise noncontributory  Objective   Vitals: Blood pressure 165/71, pulse 66, temperature 98 °F (36 7 °C), temperature source Temporal, resp  rate 21, weight 116 kg (255 lb), SpO2 90 %, not currently breastfeeding  Blood pressure 165/71, pulse 66, temperature 98 °F (36 7 °C), temperature source Temporal, resp  rate 21, weight 116 kg (255 lb), SpO2 90 %, not currently breastfeeding  Body mass index is 46 64 kg/m²    BP Readings from Last 3 Encounters:   05/31/22 165/71   05/26/22 122/72   05/24/22 122/60     Orthostatic Blood Pressures    Flowsheet Row Most Recent Value   Blood Pressure 165/71 filed at 05/31/2022 1652 Patient Position - Orthostatic VS Sitting filed at 05/31/2022 1552        No intake or output data in the 24 hours ending 05/31/22 1748  Invasive Devices  Report    Peripheral Intravenous Line  Duration           Peripheral IV 05/31/22 Right Antecubital <1 day                Physical Exam  Constitutional:       General: She is not in acute distress  Appearance: She is well-developed  She is ill-appearing  She is not diaphoretic  HENT:      Head: Normocephalic and atraumatic  Right Ear: External ear normal       Left Ear: External ear normal       Nose: Nose normal       Mouth/Throat:      Pharynx: No oropharyngeal exudate  Eyes:      General: No scleral icterus  Right eye: No discharge  Left eye: No discharge  Conjunctiva/sclera: Conjunctivae normal       Pupils: Pupils are equal, round, and reactive to light  Neck:      Thyroid: No thyromegaly  Vascular: No JVD  Trachea: No tracheal deviation  Cardiovascular:      Rate and Rhythm: Normal rate and regular rhythm  Pulses: Intact distal pulses  Heart sounds: No murmur heard  No friction rub  Gallop present  Pulmonary:      Effort: Pulmonary effort is normal  No respiratory distress  Breath sounds: Normal breath sounds  No stridor  No wheezing or rales  Chest:      Chest wall: No tenderness  Abdominal:      General: Bowel sounds are normal  There is distension  Palpations: Abdomen is soft  There is no mass  Tenderness: There is no abdominal tenderness  There is no guarding or rebound  Genitourinary:     Comments: No CVA tenderness  Musculoskeletal:         General: Swelling and deformity present  No tenderness  Cervical back: Normal range of motion  Skin:     General: Skin is warm and dry  Findings: No erythema or rash  Neurological:      Mental Status: She is alert and oriented to person, place, and time  Motor: No abnormal muscle tone        Deep Tendon Reflexes: Reflexes normal    Psychiatric:         Behavior: Behavior normal          Thought Content: Thought content normal          Judgment: Judgment normal          Lab Results:  I Have Reviewed All Lab Data Below:  Results from last 7 days   Lab Units 05/31/22  0927   WBC Thousand/uL 7 28   HEMOGLOBIN g/dL 10 3*   HEMATOCRIT % 35 2   PLATELETS Thousands/uL 195     Results from last 7 days   Lab Units 05/31/22  1531   POTASSIUM mmol/L 5 3   CHLORIDE mmol/L 102   CO2 mmol/L 31   BUN mg/dL 64*   CREATININE mg/dL 1 79*   CALCIUM mg/dL 8 3   ALK PHOS U/L 64   ALT U/L 20   AST U/L 27     Troponins:       CBC with diff:   Results from last 7 days   Lab Units 05/31/22  0927   WBC Thousand/uL 7 28   HEMOGLOBIN g/dL 10 3*   HEMATOCRIT % 35 2   MCV fL 96   PLATELETS Thousands/uL 195   MCH pg 28 1   MCHC g/dL 29 3*   RDW % 14 5   MPV fL 11 9       CMP:   Results from last 7 days   Lab Units 05/31/22  1531   SODIUM mmol/L 138   POTASSIUM mmol/L 5 3   CHLORIDE mmol/L 102   CO2 mmol/L 31   ANION GAP mmol/L 5   BUN mg/dL 64*   CREATININE mg/dL 1 79*   CALCIUM mg/dL 8 3   AST U/L 27   ALT U/L 20   ALK PHOS U/L 64   TOTAL PROTEIN g/dL 7 3   ALBUMIN g/dL 3 1*   TOTAL BILIRUBIN mg/dL 0 26   EGFR ml/min/1 73sq m 28   GLUCOSE RANDOM mg/dL 246*     Magnesium:       NT-proBNP:   Recent Labs     05/31/22  1515   NTBNP 1,859*        Coags:       Troponins:       Lipid Profile:                              Lab Results   Component Value Date    CHOLESTEROL 104 03/10/2022    HDL 30 (L) 03/10/2022    LDLCALC 48 03/10/2022    TRIG 132 03/10/2022       TSH:        Hgb A1c:       Imaging: I have personally reviewed pertinent films in PACS    Cardiac testing:   Results for orders placed during the hospital encounter of 05/25/19    Echo complete with contrast if indicated    Narrative  [address]  [address] [facility]   [phone]    Transthoracic Echocardiogram  2D, M-mode, Doppler, and Color Doppler    Study date:  29-May-2019    Patient: Carmen Hill  MR number: [id number]  Account number: [de-identified]  : 1950  Age: 79 years  Gender: Female  Status: Inpatient  Location: Bedside  Height: 62 in  Weight: 250 6 lb  BP: 133/ 62 mmHg    Indications: Tachycardia    Diagnoses: I11 9 - Hypertensive heart disease without heart failure    Sonographer:  QUETA Taylor  Referring Physician:  Renee Pichardo MD  Group:  [facility]  Interpreting Physician:  Isabella Koroma DO    SUMMARY    LEFT VENTRICLE:  Systolic function was normal by visual assessment  Ejection fraction was estimated to be 55 %  There were no regional wall motion abnormalities  Wall thickness was mildly to moderately increased  Doppler parameters were consistent with abnormal left ventricular relaxation (grade 1 diastolic dysfunction)  MITRAL VALVE:  There was mild regurgitation  AORTIC VALVE:  There was no evidence for stenosis  There was no regurgitation  TRICUSPID VALVE:  There was mild regurgitation  Pulmonary artery systolic pressure was within the normal range  HISTORY: PRIOR HISTORY: Diabetes,Diabetes, HTN, Hyperlipidemia, A Fib  PROCEDURE: The procedure was performed at the bedside  This was a routine study  The transthoracic approach was used  The study included complete 2D imaging, M-mode, complete spectral Doppler, and color Doppler  The heart rate was 77 bpm,  at the start of the study  Images were obtained from the parasternal, apical, subcostal, and suprasternal notch acoustic windows  Echocardiographic views were limited due to restricted patient mobility, poor patient compliance, poor  acoustic window availability, decreased penetration, and lung interference  This was a technically difficult study  LEFT VENTRICLE: Size was normal  Systolic function was normal by visual assessment  Ejection fraction was estimated to be 55 %  There were no regional wall motion abnormalities   Derian Gamez thickness was mildly to moderately increased  DOPPLER:  Doppler parameters were consistent with abnormal left ventricular relaxation (grade 1 diastolic dysfunction)  RIGHT VENTRICLE: The size was normal  Systolic function was normal     LEFT ATRIUM: The atrium was mildly dilated  RIGHT ATRIUM: Size was normal     MITRAL VALVE: Valve structure was normal  There was normal leaflet separation  No echocardiographic evidence for prolapse  DOPPLER: The transmitral velocity was within the normal range  There was no evidence for stenosis  There was mild  regurgitation  AORTIC VALVE: The valve was trileaflet  Leaflets exhibited normal thickness, normal cuspal separation, and sclerosis  DOPPLER: Transaortic velocity was within the normal range  There was no evidence for stenosis  There was no  regurgitation  TRICUSPID VALVE: The valve structure was normal  There was normal leaflet separation  DOPPLER: The transtricuspid velocity was within the normal range  There was mild regurgitation  Pulmonary artery systolic pressure was within the normal  range  Estimated peak PA pressure was 32 mmHg  PULMONIC VALVE: Leaflets exhibited normal thickness, no calcification, and normal cuspal separation  DOPPLER: The transpulmonic velocity was within the normal range  There was no regurgitation  PERICARDIUM: There was no thickening  There was no pericardial effusion  AORTA: The root exhibited normal size      PULMONARY ARTERY: The size was normal  The morphology appeared normal     SYSTEM MEASUREMENT TABLES    2D mode  AoR Diam 2D: 3 6 cm  LA Diam (2D): 3 9 cm  LA/Ao (2D): 1 08  FS (2D Teich): 26 9 %  IVSd (2D): 1 29 cm  LVDEV: 75 1 cmï¾³  LVESV: 35 3 cmï¾³  LVIDd(2D): 4 12 cm  LVISd (2D): 3 01 cm  LVOT Area 2D: 3 14 cmï¾²  LVPWd (2D): 1 2 cm  SV (Teich): 39 8 cmï¾³    Apical four chamber  LVEF A4C: 51 %    Unspecified Scan Mode  HANNA Cont Eq (Peak Gutierrez): 2 58 cmï¾²  LVOT Diam : 2 cm  LVOT Vmax: 963 mm/s  LVOT Vmax; Mean: 963 mm/s  Peak Grad ; Mean: 4 mm[Hg]  MV Peak A Gutierrez: 893 mm/s  MV Peak E Gutierrez  Mean: 805 mm/s  MVA (PHT): 3 67 cmï¾²  PHT: 60 ms  Max P mm[Hg]  V Max: 2360 mm/s  Vmax: 2430 mm/s  RA Area: 12 8 cmï¾²  RA Volume: 27 6 cmï¾³  TAPSE: 2 cm    [employer] Accredited Echocardiography Laboratory    Prepared and electronically signed by    Mamadou Pino DO  Signed 29-May-2019 16:19:07      EKG:  Currently in sinus rhythm    Counseling / Coordination of Care Time: 45 minutes  Greater than 50% of total time spent on patient counseling and coordination of care  Code Status: Prior  Collaboration of Care: Were Recommendations Directly Discussed with Primary Treatment Team? - Yes     Lexa Riggs MD [facility]    "This note has been constructed using a voice recognition system  Therefore there may be syntax, spelling, and/or grammatical errors   Please call if you have any questions  "

## 2022-05-31 NOTE — ED PROVIDER NOTES
History  Chief Complaint   Patient presents with    Atrial Fibrillation     Was in A fib  last night  Sent in to see cardiac doc  Dr Arley Hendrickson     HPI  89L with paroxysmal afib, diabetes, diastolic heart failure presenting for weight gain, approximately 10 lb over the past week  Patient had torsemide dose doubled however has continued to gain weight despite this  Patient has been having episodes of atrial fibrillation over the past 4 days, lightheadedness, shortness of breath  Patient has been having dyspnea on exertion, has baseline orthopnea which has not recently worsened  Patient has been having chest tightness in the setting of atrial fibrillation, most recently yesterday  Patient denying any of these symptoms at the moment  Patient sleeps Patient is on oxygen QHS, is supposed to be on CPAP for sleep apnea but has been non-compliant  Prior to Admission Medications   Prescriptions Last Dose Informant Patient Reported? Taking? Insulin Pen Needle (BD Pen Needle Pilar 2nd Gen) 32G X 4 MM MISC   No No   Sig: For use with insulin pen  Pharmacy may dispense brand covered by insurance     Insulin Pen Needle (NovoFine Autocover) 30G X 8 MM MISC  Self No No   Sig: Inject under the skin daily   Insulin Pen Needle 30G X 8 MM MISC   Yes No   Si (two) times a day   Lancets (onetouch ultrasoft) lancets  Self No No   Sig: Use once daily   Toujeo SoloStar 300 units/mL CONCENTRATED U-300 injection pen (1-unit dial)   No No   Sig: Inject 25 Units under the skin daily at bedtime   Trulicity 1 5 EF/2 2AL SOPN  Self Yes No   Sig: inject 0 5 milliliter subcutaneously every week 28   acetaminophen (TYLENOL) 325 mg tablet  Self Yes No   Sig: Take 650 mg by mouth every 6 (six) hours as needed for mild pain     albuterol (PROVENTIL HFA,VENTOLIN HFA) 90 mcg/act inhaler  Self No No   Sig: Inhale 2 puffs every 6 (six) hours as needed for wheezing   ammonium lactate (LAC-HYDRIN) 12 % lotion  Self Yes No   Sig: APPLY TOPICALLY TO AFFECTED AREAS twice a day   apixaban (ELIQUIS) 5 mg   No No   Sig: Take 1 tablet (5 mg total) by mouth in the morning and 1 tablet (5 mg total) in the evening  b complex-vitamin C-folic acid (NEPHROCAPS) 1 mg capsule   No No   Sig: Take 1 capsule by mouth daily   docusate sodium (COLACE) 100 mg capsule  Self Yes No   Sig: Take 100 mg by mouth in the morning   glucose blood (OneTouch Ultra) test strip  Self No No   Sig: Test twice daily     insulin lispro (HumaLOG KwikPen) 100 units/mL injection pen   No No   Sig: 3 times with meal according to sliding scale - >Blood Glucose 150 - 199: 2 Unit of Insulin, Blood Glucose 200 - 249: 4 Units of Insulin, Blood Glucose 250 - 299: 6 Units of Insulin, Blood Glucose 300 - 349: 8 Units of Insulin, Blood Glucose 350 - 399: 10 Units of Inuslin,Blood Glucose greater than or equal to 400: 12 Units of Insulin-please contact your physician   metoprolol tartrate (LOPRESSOR) 50 mg tablet   No No   Sig: Take 1 tablet (50 mg total) by mouth every 12 (twelve) hours   montelukast (SINGULAIR) 10 mg tablet  Self Yes No   Sig: Take 10 mg by mouth daily     nystatin (MYCOSTATIN) powder  Self Yes No   Sig: Apply topically 2 (two) times a day   pregabalin (LYRICA) 100 mg capsule  Self Yes No   Sig: Take 100 mg by mouth 2 (two) times a day    rosuvastatin (CRESTOR) 10 MG tablet  Self Yes No   Sig: 10 mg daily     sodium chloride (OCEAN) 0 65 % nasal spray   No No   Si spray into each nostril every hour as needed for congestion   torsemide (DEMADEX) 20 mg tablet   No No   Sig: Take 1 tablet (20 mg total) by mouth every morning      Facility-Administered Medications: None       Past Medical History:   Diagnosis Date    Anemia     Asthma     COVID-2022    GERD (gastroesophageal reflux disease)     Hyperlipidemia     Kidney stones     PONV (postoperative nausea and vomiting)     SV[facility]supraventricular tachycardia) ([facility])        Past Surgical History:   Procedure Laterality Date    APPENDECTOMY      CYSTOSCOPY W/ LASER LITHOTRIPSY Left 2016    Procedure: CYSTOSCOPY URETEROSCOPY WITH LITHOTRIPSY HOLMIUM LASER, RETROGRADE PYELOGRAM AND INSERTION STENT URETERAL;  Surgeon: Chica Messina MD;  Location: [address] [place] [address];  Service:     DILATION AND CURETTAGE OF UTERUS      IR THORACENTESIS  3/18/2022    JOINT REPLACEMENT      right knee    KNEE ARTHROPLASTY Right     KY CYSTOURETHROSCOPY,URETER CATHETER Left 2016    Procedure: CYSTOSCOPY RETROGRADE PYELOGRAM WITH INSERTION STENT URETERAL, left;  Surgeon: Chica Messina MD;  Location: [address] [place] [address];  Service: Urology    KY RECONSTR TOTAL SHOULDER IMPLANT Left 3/1/2022    Procedure: ARTHROPLASTY SHOULDER REVERSE;  Surgeon: Kumar Phillips MD;  [facility];  Service: Orthopedics    SHOULDER ARTHROTOMY Left        Family History   Problem Relation Age of Onset    Heart disease Mother     Emphysema Maternal Grandmother     Heart disease Family      I have reviewed and agree with the history as documented  E-Cigarette/Vaping    E-Cigarette Use Never User      E-Cigarette/Vaping Substances     Social History     Tobacco Use    Smoking status: Former Smoker     Packs/day: 1 00     Years: 20 00     Pack years: 20 00     Types: Cigarettes     Quit date: 1996     Years since quittin 9    Smokeless tobacco: Never Used   Vaping Use    Vaping Use: Never used   Substance Use Topics    Alcohol use: Not Currently     Comment: rarely    Drug use: No       Review of Systems   Constitutional: Positive for unexpected weight change  Negative for chills, fatigue and fever  HENT: Negative for sore throat  Eyes: Negative for visual disturbance  Respiratory: Positive for chest tightness and shortness of breath  Negative for cough  Cardiovascular: Positive for leg swelling  Negative for chest pain and palpitations     Gastrointestinal: Negative for abdominal distention, abdominal pain, constipation, diarrhea, nausea and vomiting  Endocrine: Negative for polydipsia and polyuria  Genitourinary: Negative for dysuria and hematuria  Musculoskeletal: Negative for arthralgias and myalgias  Skin: Negative for color change and rash  Neurological: Positive for light-headedness  Negative for dizziness and headaches  Psychiatric/Behavioral: Negative for confusion  All other systems reviewed and are negative  Physical Exam  Physical Exam  Vitals reviewed  Constitutional:       General: She is not in acute distress  Appearance: She is well-developed  She is not diaphoretic  Comments: Well-appearing, nondistressed   HENT:      Head: Normocephalic and atraumatic  Comments: Moist mucous membranes     Right Ear: External ear normal       Left Ear: External ear normal       Nose: Nose normal       Mouth/Throat:      Pharynx: No oropharyngeal exudate  Eyes:      Pupils: Pupils are equal, round, and reactive to light  Cardiovascular:      Rate and Rhythm: Normal rate and regular rhythm  Heart sounds: Normal heart sounds  No murmur heard  No friction rub  No gallop  Comments: Patient normal sinus rhythm on the monitor  Regular rate and rhythm, no murmurs rubs or gallops  Extremities warm and well perfused  Pulmonary:      Effort: Pulmonary effort is normal  No respiratory distress  Breath sounds: Normal breath sounds  No wheezing  Comments: Satting 93% on room air with no increased work of breathing  Bilateral rales left worse than right  Chest:      Chest wall: No tenderness  Abdominal:      General: Bowel sounds are normal  There is no distension  Palpations: Abdomen is soft  There is no mass  Tenderness: There is no abdominal tenderness  There is no guarding  Musculoskeletal:         General: No deformity  Normal range of motion  Cervical back: Normal range of motion  Comments: Bilateral pitting edema pronounced around the ankles    No unilateral swelling  No erythema  Lymphadenopathy:      Cervical: No cervical adenopathy  Skin:     General: Skin is warm and dry  Capillary Refill: Capillary refill takes less than 2 seconds  Neurological:      Mental Status: She is alert and oriented to person, place, and time        Comments: CHANTALEO x4         Vital Signs  ED Triage Vitals [05/31/22 1303]   Temperature Pulse Respirations Blood Pressure SpO2   98 °F (36 7 °C) 71 17 125/59 93 %      Temp Source Heart Rate Source Patient Position - Orthostatic VS BP Location FiO2 (%)   Temporal Monitor Sitting Right arm --      Pain Score       No Pain           Vitals:    05/31/22 1553 05/31/22 1623 05/31/22 1653 05/31/22 1756   BP: 138/77 149/63 165/71 152/68   Pulse: 72 64 66 68   Patient Position - Orthostatic VS:    Lying         Visual Acuity      ED Medications  Medications   furosemide (LASIX) injection 40 mg (40 mg Intravenous Given 5/31/22 1532)       Diagnostic Studies  Results Reviewed     Procedure Component Value Units Date/Time    Comprehensive metabolic panel [[id number]]  (Abnormal) Collected: 05/31/22 1531    Lab Status: Final result Specimen: Blood from Arm, Right Updated: 05/31/22 1607     Sodium 138 mmol/L      Potassium 5 3 mmol/L      Chloride 102 mmol/L      CO2 31 mmol/L      ANION GAP 5 mmol/L      BUN 64 mg/dL      Creatinine 1 79 mg/dL      Glucose 246 mg/dL      Calcium 8 3 mg/dL      Corrected Calcium 9 0 mg/dL      AST 27 U/L      ALT 20 U/L      Alkaline Phosphatase 64 U/L      Total Protein 7 3 g/dL      Albumin 3 1 g/dL      Total Bilirubin 0 26 mg/dL      eGFR 28 ml/min/1 73sq m     Narrative:      [place] guidelines for Chronic Kidney Disease (CKD):     Stage 1 with normal or high GFR (GFR > 90 mL/min/1 73 square meters)    Stage 2 Mild CKD (GFR = 60-89 mL/min/1 73 square meters)    Stage 3A Moderate CKD (GFR = 45-59 mL/min/1 73 square meters)    Stage 3B Moderate CKD (GFR = 30-44 mL/min/1 73 square meters)    Stage 4 Severe CKD (GFR = 15-29 mL/min/1 73 square meters)    Stage 5 End Stage CKD (GFR <15 mL/min/1 73 square meters)  Note: GFR calculation is accurate only with a steady state creatinine    NT-BNP PRO [[id number]]  (Abnormal) Collected: 05/31/22 1515    Lab Status: Final result Specimen: Blood from Arm, Right Updated: 05/31/22 1545     NT-proBNP 1,859 pg/mL     HS Troponin 0hr (reflex protocol) [[id number]]  (Normal) Collected: 05/31/22 1515    Lab Status: Final result Specimen: Blood from Arm, Right Updated: 05/31/22 1545     hs TnI 0hr 8 ng/L                  XR chest 1 view portable   Final Result by Kassy Jenkins MD (05/31 1750)      Improving aeration compatible with improving CHF   No acute infiltrate identified                  Workstation performed: [id number]                    Procedures  Procedures         ED Course  ED Course as of 05/31/22 2228   Tue May 31, 2022   1623 Discussed patient with Dr Eliseo Oh who will evaluate patient in ED                               SBIRT 20yo+    Flowsheet Row Most Recent Value   SBIRT (23 yo +)    In order to provide better care to our patients, we are screening all of our patients for alcohol and drug use  Would it be okay to ask you these screening questions? Yes Filed at: 05/31/2022 1352   Initial Alcohol Screen: US AUDIT-C     1  How often do you have a drink containing alcohol? 0 Filed at: 05/31/2022 1352   2  How many drinks containing alcohol do you have on a typical day you are drinking? 0 Filed at: 05/31/2022 1352   3a  Male UNDER 65: How often do you have five or more drinks on one occasion? 0 Filed at: 05/31/2022 1352   3b  FEMALE Any Age, or MALE 65+: How often do you have 4 or more drinks on one occassion? 0 Filed at: 05/31/2022 1352   Audit-C Score 0 Filed at: 05/31/2022 1352   AYDIN: How many times in the past year have you    Used an illegal drug or used a prescription medication for non-medical reasons?  Never Filed at: 05/31/2022 1352                    [facility]  Number of Diagnoses or Management Options  Chronic diastolic congestive heart failure ([facility] )  Dyspnea  Diagnosis management comments: Cardiac workup including CBC, CMP, BNP, troponin remarkable only for BNP of 1900 from 4900  Chest x-ray improved from prior without significant pulmonary edema or effusion  Patient remaining well-appearing with normal vital signs in emergency department  Patient evaluated at bedside by Dr Seun Motley with cardiology recommended increasing torsemide dosage to 60 mg, repeat BNP as an outpatient in 5 days, continued outpatient follow-up  Patient agreeable with this plan, discharged with verbal and written return precautions  Disposition  Final diagnoses:   Chronic diastolic congestive heart failure ([facility] )   Dyspnea     Time reflects when diagnosis was documented in both MDM as applicable and the Disposition within this note     Time User Action Codes Description Comment    5/31/2022  5:07 PM Stacey Gan [U50 [id number]] Chronic diastolic congestive heart failure ([facility] )     5/31/2022  5:40 PM Stacey Gan [R06 [id number]] Dyspnea       ED Disposition     ED Disposition   Discharge    Condition   Stable    Date/Time   Tue May 31, 2022  5:39 PM    Comment   Ankur Griggs discharge to home/self care                 Follow-up Information     Follow up With Specialties Details Why Contact Info Additional Information    [address] [facility] Emergency Department Emergency Medicine  If symptoms worsen [address] [facility] [id number] [address] [address] Emergency Department, Critical access hospital, [address] [place] [address], [facility]          Discharge Medication List as of 5/31/2022  5:43 PM      CONTINUE these medications which have NOT CHANGED    Details   acetaminophen (TYLENOL) 325 mg tablet Take 650 mg by mouth every 6 (six) hours as needed for mild pain  , Historical Med      albuterol (PROVENTIL HFA,VENTOLIN HFA) 90 mcg/act inhaler Inhale 2 puffs every 6 (six) hours as needed for wheezing, Starting Fri 3/25/2022, Normal      ammonium lactate (LAC-HYDRIN) 12 % lotion APPLY TOPICALLY TO AFFECTED AREAS twice a day, Historical Med      apixaban (ELIQUIS) 5 mg Take 1 tablet (5 mg total) by mouth in the morning and 1 tablet (5 mg total) in the evening , Starting Tue 5/24/2022, Normal      b complex-vitamin C-folic acid (NEPHROCAPS) 1 mg capsule Take 1 capsule by mouth daily, Starting Tue 5/3/2022, Until Thu 6/2/2022, Normal      docusate sodium (COLACE) 100 mg capsule Take 100 mg by mouth in the morning, Historical Med      glucose blood (OneTouch Ultra) test strip Test twice daily  , Normal      insulin lispro (HumaLOG KwikPen) 100 units/mL injection pen 3 times with meal according to sliding scale - >Blood Glucose 150 - 199: 2 Unit of Insulin, Blood Glucose 200 - 249: 4 Units of Insulin, Blood Glucose 250 - 299: 6 Units of Insulin, Blood Glucose 300 - 349: 8 Units of Insulin, Blood Glucose 350 - 399: 10  Units of Inuslin,Blood Glucose greater than or equal to 400: 12 Units of Insulin-please contact your physician, Normal      !! Insulin Pen Needle (BD Pen Needle Pilar 2nd Gen) 32G X 4 MM MISC For use with insulin pen  Pharmacy may dispense brand covered by insurance , Normal      !! Insulin Pen Needle (NovoFine Autocover) 30G X 8 MM MISC Inject under the skin daily, Starting Fri 4/22/2022, Normal      !!  Insulin Pen Needle 30G X 8 MM MISC 2 (two) times a day, Historical Med      Lancets (onetouch ultrasoft) lancets Use once daily, Normal      metoprolol tartrate (LOPRESSOR) 50 mg tablet Take 1 tablet (50 mg total) by mouth every 12 (twelve) hours, Starting Tue 5/24/2022, Normal      montelukast (SINGULAIR) 10 mg tablet Take 10 mg by mouth daily  , Historical Med      nystatin (MYCOSTATIN) powder Apply topically 2 (two) times a day, Historical Med      pregabalin (LYRICA) 100 mg capsule Take 100 mg by mouth 2 (two) times a day , Historical Med      rosuvastatin (CRESTOR) 10 MG tablet 10 mg daily  , Starting Thu 8/29/2019, Historical Med      sodium chloride (OCEAN) 0 65 % nasal spray 1 spray into each nostril every hour as needed for congestion, Starting Mon 5/23/2022, No Print      torsemide (DEMADEX) 20 mg tablet Take 1 tablet (20 mg total) by mouth every morning, Starting Tue 5/24/2022, Until Thu 6/23/2022, Normal      Toujeo SoloStar 300 units/mL CONCENTRATED U-300 injection pen (1-unit dial) Inject 25 Units under the skin daily at bedtime, Starting Mon 5/23/2022, No Print      Trulicity 1 5 GL/4 3JF SOPN inject 0 5 milliliter subcutaneously every week 28, Historical Med       !! - Potential duplicate medications found  Please discuss with provider  Outpatient Discharge Orders   B-Type Natriuretic Peptide   Standing Status: Future Standing Exp   Date: 05/31/23       PDMP Review       Value Time User    PDMP Reviewed  Yes 3/12/2022 10:25 AM RK Pearce          ED Provider  Electronically Signed by           Alecia Ferrari MD  05/31/22 7835

## 2022-05-31 NOTE — TELEPHONE ENCOUNTER
Caregiver called c/o pt 3 lbs weight gain and went in and out of Afib this weekend  On the way to the ed  Tiger text dr Gisel Krishnamurthy to give him a heads up she is on the way to the ED

## 2022-05-31 NOTE — TELEPHONE ENCOUNTER
Patient's niece Desire Chino called this morning regarding atrial fibrilation and 3 lbs weight gain  Fady Ray went into atrial fibrilation last night and then recovered , she refused to go the ER last night  I advised bryanna to reach out to [facility] cardiologist regarding the atrial fibrilation and weight gain as Dr Marvel Mitchell is on vacation this week  I also advised her that if Fady Ray goes in atrial fibrilation again she should go to the ER

## 2022-05-31 NOTE — DISCHARGE INSTRUCTIONS
Continue fluid restriction  In discussion with Dr Gisel Krishnamurthy, we recommend increase of torsemide to 60 mg  You will need a repeat BNP in 5 days which we have ordered  Follow-up with primary care provider in 1 week for further evaluation  If you are having worsening shortness of breath, continued weight gain, chest pain, return to the emergency department for further evaluation

## 2022-05-31 NOTE — ED NOTES
Cardiology consult at bedside  Per cardiologist d/c troponin        Emily Yoder, [address] [facility]  05/31/22 9657

## 2022-06-03 ENCOUNTER — PATIENT OUTREACH (OUTPATIENT)
Dept: INTERNAL MEDICINE CLINIC | Facility: CLINIC | Age: 72
End: 2022-06-03

## 2022-06-03 NOTE — PROGRESS NOTES
Outreach to patient  States "she keeps getting the fluid" Cardiology tripled torsemide for 5 days  Will be finishing up the fifth day of this course  Pt states she is weighing herself everyday  Current weight 251 pounds  Pt reports that she is doing everything she is supposed to be doing  Increased activity, low sodium diet, fluid restrictions, taking medications as directed, elevating legs  States she is receiving services through [employer] [facility]  Case discussed with Sandy Katz will on continue to educate on HF management and diabetes management  Will place order for dietitian

## 2022-06-04 ENCOUNTER — HOSPITAL ENCOUNTER (INPATIENT)
Facility: HOSPITAL | Age: 72
LOS: 8 days | Discharge: HOME HEALTH | DRG: 291 | End: 2022-06-12
Attending: EMERGENCY MEDICINE | Admitting: HOSPITALIST
Payer: MEDICARE

## 2022-06-04 ENCOUNTER — PATIENT OUTREACH (OUTPATIENT)
Dept: INTERNAL MEDICINE CLINIC | Facility: CLINIC | Age: 72
End: 2022-06-04

## 2022-06-04 ENCOUNTER — APPOINTMENT (EMERGENCY)
Dept: RADIOLOGY | Facility: HOSPITAL | Age: 72
DRG: 291 | End: 2022-06-04
Payer: MEDICARE

## 2022-06-04 ENCOUNTER — TELEPHONE (OUTPATIENT)
Dept: OTHER | Facility: OTHER | Age: 72
End: 2022-06-04

## 2022-06-04 DIAGNOSIS — R79.89 ELEVATED SERUM CREATININE: ICD-10-CM

## 2022-06-04 DIAGNOSIS — J96.21 ACUTE ON CHRONIC RESPIRATORY FAILURE WITH HYPOXIA AND HYPERCAPNIA (HCC): ICD-10-CM

## 2022-06-04 DIAGNOSIS — J96.22 ACUTE ON CHRONIC RESPIRATORY FAILURE WITH HYPOXIA AND HYPERCAPNIA (HCC): ICD-10-CM

## 2022-06-04 DIAGNOSIS — I50.33 ACUTE ON CHRONIC DIASTOLIC CHF (CONGESTIVE HEART FAILURE) (HCC): ICD-10-CM

## 2022-06-04 DIAGNOSIS — R09.02 HYPOXIA: ICD-10-CM

## 2022-06-04 DIAGNOSIS — L89.156 PRESSURE INJURY OF DEEP TISSUE OF SACRAL REGION: ICD-10-CM

## 2022-06-04 DIAGNOSIS — I80.8 THROMBOPHLEBITIS OF ARM, RIGHT: ICD-10-CM

## 2022-06-04 DIAGNOSIS — R06.00 DOE (DYSPNEA ON EXERTION): Primary | ICD-10-CM

## 2022-06-04 PROBLEM — G47.33 OSA (OBSTRUCTIVE SLEEP APNEA): Status: ACTIVE | Noted: 2022-06-04

## 2022-06-04 LAB
2HR DELTA HS TROPONIN: 0 NG/L
4HR DELTA HS TROPONIN: 1 NG/L
ALBUMIN SERPL BCP-MCNC: 3.8 G/DL (ref 3.5–5)
ALP SERPL-CCNC: 61 U/L (ref 34–104)
ALT SERPL W P-5'-P-CCNC: 12 U/L (ref 7–52)
ANION GAP SERPL CALCULATED.3IONS-SCNC: 9 MMOL/L (ref 4–13)
APTT PPP: 31 SECONDS (ref 23–37)
AST SERPL W P-5'-P-CCNC: 15 U/L (ref 13–39)
BASOPHILS # BLD AUTO: 0.07 THOUSANDS/ΜL (ref 0–0.1)
BASOPHILS NFR BLD AUTO: 1 % (ref 0–1)
BILIRUB SERPL-MCNC: 0.37 MG/DL (ref 0.2–1)
BNP SERPL-MCNC: 74 PG/ML (ref 0–100)
BUN SERPL-MCNC: 69 MG/DL (ref 5–25)
CALCIUM SERPL-MCNC: 8.8 MG/DL (ref 8.4–10.2)
CARDIAC TROPONIN I PNL SERPL HS: 6 NG/L
CARDIAC TROPONIN I PNL SERPL HS: 6 NG/L
CARDIAC TROPONIN I PNL SERPL HS: 7 NG/L
CHLORIDE SERPL-SCNC: 98 MMOL/L (ref 96–108)
CO2 SERPL-SCNC: 32 MMOL/L (ref 21–32)
CREAT SERPL-MCNC: 1.62 MG/DL (ref 0.6–1.3)
EOSINOPHIL # BLD AUTO: 0.5 THOUSAND/ΜL (ref 0–0.61)
EOSINOPHIL NFR BLD AUTO: 6 % (ref 0–6)
ERYTHROCYTE [DISTWIDTH] IN BLOOD BY AUTOMATED COUNT: 14.5 % (ref 11.6–15.1)
GFR SERPL CREATININE-BSD FRML MDRD: 32 ML/MIN/1.73SQ M
GLUCOSE SERPL-MCNC: 182 MG/DL (ref 65–140)
GLUCOSE SERPL-MCNC: 196 MG/DL (ref 65–140)
GLUCOSE SERPL-MCNC: 206 MG/DL (ref 65–140)
HCT VFR BLD AUTO: 35.8 % (ref 34.8–46.1)
HGB BLD-MCNC: 11 G/DL (ref 11.5–15.4)
IMM GRANULOCYTES # BLD AUTO: 0.03 THOUSAND/UL (ref 0–0.2)
IMM GRANULOCYTES NFR BLD AUTO: 0 % (ref 0–2)
INR PPP: 1.25 (ref 0.84–1.19)
LYMPHOCYTES # BLD AUTO: 2.05 THOUSANDS/ΜL (ref 0.6–4.47)
LYMPHOCYTES NFR BLD AUTO: 25 % (ref 14–44)
MCH RBC QN AUTO: 28.2 PG (ref 26.8–34.3)
MCHC RBC AUTO-ENTMCNC: 30.7 G/DL (ref 31.4–37.4)
MCV RBC AUTO: 92 FL (ref 82–98)
MONOCYTES # BLD AUTO: 0.77 THOUSAND/ΜL (ref 0.17–1.22)
MONOCYTES NFR BLD AUTO: 10 % (ref 4–12)
NEUTROPHILS # BLD AUTO: 4.66 THOUSANDS/ΜL (ref 1.85–7.62)
NEUTS SEG NFR BLD AUTO: 58 % (ref 43–75)
NRBC BLD AUTO-RTO: 0 /100 WBCS
PLATELET # BLD AUTO: 181 THOUSANDS/UL (ref 149–390)
PMV BLD AUTO: 11.2 FL (ref 8.9–12.7)
POTASSIUM SERPL-SCNC: 4.5 MMOL/L (ref 3.5–5.3)
PROT SERPL-MCNC: 7.6 G/DL (ref 6.4–8.4)
PROTHROMBIN TIME: 15.7 SECONDS (ref 11.6–14.5)
RBC # BLD AUTO: 3.9 MILLION/UL (ref 3.81–5.12)
SODIUM SERPL-SCNC: 139 MMOL/L (ref 135–147)
WBC # BLD AUTO: 8.08 THOUSAND/UL (ref 4.31–10.16)

## 2022-06-04 PROCEDURE — 36415 COLL VENOUS BLD VENIPUNCTURE: CPT | Performed by: PHYSICIAN ASSISTANT

## 2022-06-04 PROCEDURE — 99285 EMERGENCY DEPT VISIT HI MDM: CPT

## 2022-06-04 PROCEDURE — 84484 ASSAY OF TROPONIN QUANT: CPT | Performed by: PHYSICIAN ASSISTANT

## 2022-06-04 PROCEDURE — 84484 ASSAY OF TROPONIN QUANT: CPT | Performed by: HOSPITALIST

## 2022-06-04 PROCEDURE — 83880 ASSAY OF NATRIURETIC PEPTIDE: CPT | Performed by: PHYSICIAN ASSISTANT

## 2022-06-04 PROCEDURE — 99285 EMERGENCY DEPT VISIT HI MDM: CPT | Performed by: PHYSICIAN ASSISTANT

## 2022-06-04 PROCEDURE — 99223 1ST HOSP IP/OBS HIGH 75: CPT | Performed by: HOSPITALIST

## 2022-06-04 PROCEDURE — 85610 PROTHROMBIN TIME: CPT | Performed by: PHYSICIAN ASSISTANT

## 2022-06-04 PROCEDURE — 93005 ELECTROCARDIOGRAM TRACING: CPT

## 2022-06-04 PROCEDURE — 85025 COMPLETE CBC W/AUTO DIFF WBC: CPT | Performed by: PHYSICIAN ASSISTANT

## 2022-06-04 PROCEDURE — 71046 X-RAY EXAM CHEST 2 VIEWS: CPT

## 2022-06-04 PROCEDURE — 82948 REAGENT STRIP/BLOOD GLUCOSE: CPT

## 2022-06-04 PROCEDURE — 80053 COMPREHEN METABOLIC PANEL: CPT | Performed by: PHYSICIAN ASSISTANT

## 2022-06-04 PROCEDURE — 85730 THROMBOPLASTIN TIME PARTIAL: CPT | Performed by: PHYSICIAN ASSISTANT

## 2022-06-04 RX ORDER — PREGABALIN 100 MG/1
100 CAPSULE ORAL 2 TIMES DAILY
Status: DISCONTINUED | OUTPATIENT
Start: 2022-06-04 | End: 2022-06-12 | Stop reason: HOSPADM

## 2022-06-04 RX ORDER — INSULIN GLARGINE 100 [IU]/ML
25 INJECTION, SOLUTION SUBCUTANEOUS
Status: DISCONTINUED | OUTPATIENT
Start: 2022-06-04 | End: 2022-06-12 | Stop reason: HOSPADM

## 2022-06-04 RX ORDER — MONTELUKAST SODIUM 10 MG/1
10 TABLET ORAL DAILY
Status: DISCONTINUED | OUTPATIENT
Start: 2022-06-05 | End: 2022-06-12 | Stop reason: HOSPADM

## 2022-06-04 RX ORDER — ALBUTEROL SULFATE 90 UG/1
2 AEROSOL, METERED RESPIRATORY (INHALATION) EVERY 6 HOURS PRN
Status: DISCONTINUED | OUTPATIENT
Start: 2022-06-04 | End: 2022-06-12 | Stop reason: HOSPADM

## 2022-06-04 RX ORDER — FUROSEMIDE 10 MG/ML
60 INJECTION INTRAMUSCULAR; INTRAVENOUS
Status: DISCONTINUED | OUTPATIENT
Start: 2022-06-05 | End: 2022-06-07

## 2022-06-04 RX ORDER — METOPROLOL TARTRATE 50 MG/1
50 TABLET, FILM COATED ORAL EVERY 12 HOURS SCHEDULED
Status: DISCONTINUED | OUTPATIENT
Start: 2022-06-04 | End: 2022-06-12 | Stop reason: HOSPADM

## 2022-06-04 RX ORDER — PRAVASTATIN SODIUM 80 MG/1
80 TABLET ORAL
Status: DISCONTINUED | OUTPATIENT
Start: 2022-06-04 | End: 2022-06-12 | Stop reason: HOSPADM

## 2022-06-04 RX ORDER — INSULIN LISPRO 100 [IU]/ML
1-6 INJECTION, SOLUTION INTRAVENOUS; SUBCUTANEOUS
Status: DISCONTINUED | OUTPATIENT
Start: 2022-06-05 | End: 2022-06-10

## 2022-06-04 RX ORDER — INSULIN LISPRO 100 [IU]/ML
1-6 INJECTION, SOLUTION INTRAVENOUS; SUBCUTANEOUS
Status: DISCONTINUED | OUTPATIENT
Start: 2022-06-04 | End: 2022-06-12 | Stop reason: HOSPADM

## 2022-06-04 RX ADMIN — METOPROLOL TARTRATE 50 MG: 50 TABLET, FILM COATED ORAL at 20:00

## 2022-06-04 RX ADMIN — PREGABALIN 100 MG: 100 CAPSULE ORAL at 19:58

## 2022-06-04 RX ADMIN — INSULIN LISPRO 2 UNITS: 100 INJECTION, SOLUTION INTRAVENOUS; SUBCUTANEOUS at 22:04

## 2022-06-04 RX ADMIN — INSULIN GLARGINE 25 UNITS: 100 INJECTION, SOLUTION SUBCUTANEOUS at 22:04

## 2022-06-04 RX ADMIN — APIXABAN 5 MG: 5 TABLET, FILM COATED ORAL at 20:00

## 2022-06-04 RX ADMIN — PRAVASTATIN SODIUM 80 MG: 80 TABLET ORAL at 19:57

## 2022-06-04 NOTE — ASSESSMENT & PLAN NOTE
· Mildly hypoxic on admission; wears nocturnal O2 for KATRINA   · Currently, on 2L satting high 80s   · Wean with diuresis

## 2022-06-04 NOTE — TELEPHONE ENCOUNTER
Thang Carter called in stating pt lost 4 of the 10 lbs she gained and she was short of breath while talking  Her right lung was full of crackles  Her left lung was diminished and full of crackles  Thang Carter told her to put her oxygen bag on because she wasn't using it anymore and told daughter to take her to the ER because she's worse today  On call paged via TC

## 2022-06-04 NOTE — H&P
[facility]  H&P- Sarahy Morillo 1950, 73 y o  female MRN: [id number]  Unit/Bed#: [facility] Encounter: [id number]  Primary Care Provider: Jaqui Barriga MD   Date and time admitted to hospital: 6/4/2022  2:34 PM    * Acute on chronic diastolic CHF (congestive heart failure) ([facility] )  Assessment & Plan  Wt Readings from Last 3 Encounters:   05/31/22 116 kg (255 lb)   05/26/22 114 kg (252 lb)   05/24/22 113 kg (249 lb)     · POA with increased weight gain, SOB and worsening LE edema   · Compliant with diuretics; no dietary indiscretion  Denies episodes of afib since her recent ED visit  · Start lasix 60mg BID   · I/Os, daily weights  · Low salt diet  · Cardiology consult        Acute respiratory failure with hypoxia ([facility] )  Assessment & Plan  · Mildly hypoxic on admission; wears nocturnal O2 for KATRINA   · Currently, on 2L satting high 80s   · Wean with diuresis  Paroxysmal atrial fibrillation (HCC)  Assessment & Plan  · Cont metoprolol BID   · Cont apixaban     Diabetes mellitus, type 2 (HCC)  Assessment & Plan  Lab Results   Component Value Date    HGBA1C 6 8 (H) 03/09/2022       No results for input(s): POCGLU in the last 72 hours  Blood Sugar Average: Last 72 hrs:  resume lantus 25units at night  ISS and accuchecks     KATRINA (obstructive sleep apnea)  Assessment & Plan  · On nocturnal O2   · Non compliant with BIPAP   · outpt follow-up with sleep medicine     VTE Pharmacologic Prophylaxis: VTE Score: 7 High Risk (Score >/= 5) - Pharmacological DVT Prophylaxis Ordered: apixaban (Eliquis)  Sequential Compression Devices Ordered  Code Status: FULL   Discussion with family: Updated  (niece) at bedside  Anticipated Length of Stay: Patient will be admitted on an inpatient basis with an anticipated length of stay of greater than 2 midnights secondary to acute on chronic diastolic CHF       Total Time for Visit, including Counseling / Coordination of Care: 70 minutes Greater than 50% of this total time spent on direct patient counseling and coordination of care  Chief Complaint: SOB     History of Present Illness:  Pablo Rmaos is a 73 y o  female with a PMH of history of SVT, paroxysmal AFib on Eliquis, diastolic CHF, KATRINA on nocturnal O2 who presents with worsening shortness of breath and lower extremity edema  Patient's symptoms have been progressing over several weeks  She presented to [address] [place] [address] ED for evaluation  She was seen and evaluated by Cardiology at that time and they recommended increasing her diuretics with outpatient follow-up  Patient reported she had some improvement in her weight gain but continued to feel unwell  Today, patient was hypoxic at home with spO2 in the mid 80s prompting her presentation to the ED  She denies any missed doses of diuretics  No dietary indiscretion  While she occasionally has symptomatic AFib she reports no episodes in the last 1-2 weeks  Patient is supposed to be wearing BiPAP at night but has been unable to due to claustrophobia  Her and her niece do state that she has been increasing her tolerance for BiPAP and she has recently been able to wear it for about 2hours  Review of Systems:  Review of Systems   Constitutional: Negative for appetite change, chills, fatigue and fever  Respiratory: Positive for cough and shortness of breath  Cardiovascular: Positive for leg swelling  Negative for chest pain and palpitations  All other systems reviewed and are negative        Past Medical and Surgical History:   Past Medical History:   Diagnosis Date    Anemia     Asthma     CHF [facility]congestive heart failure) ([facility]     COVID-19     January 2022    GERD (gastroesophageal reflux disease)     Hyperlipidemia     Kidney stones     PONV (postoperative nausea and vomiting)     SVT [facility]supraventricular tachycardia) ([facility])        Past Surgical History:   Procedure Laterality Date    APPENDECTOMY      CYSTOSCOPY W/ LASER LITHOTRIPSY Left 7/12/2016    Procedure: CYSTOSCOPY URETEROSCOPY WITH LITHOTRIPSY HOLMIUM LASER, RETROGRADE PYELOGRAM AND INSERTION STENT URETERAL;  Surgeon: Chica Messina MD;  Location: [address] [place] [address];  Service:     DILATION AND CURETTAGE OF UTERUS      IR THORACENTESIS  3/18/2022    JOINT REPLACEMENT      right knee    KNEE ARTHROPLASTY Right     MO CYSTOURETHROSCOPY,URETER CATHETER Left 6/29/2016    Procedure: CYSTOSCOPY RETROGRADE PYELOGRAM WITH INSERTION STENT URETERAL, left;  Surgeon: Chica Messina MD;  Location: [address] [place] [address];  Service: Urology    MO RECONSTR TOTAL SHOULDER IMPLANT Left 3/1/2022    Procedure: ARTHROPLASTY SHOULDER REVERSE;  Surgeon: Kumar Phillips MD;  Location: [facility];  Service: Orthopedics    SHOULDER ARTHROTOMY Left        Meds/Allergies:  Prior to Admission medications    Medication Sig Start Date End Date Taking? Authorizing Provider   acetaminophen (TYLENOL) 325 mg tablet Take 650 mg by mouth every 6 (six) hours as needed for mild pain      Historical Provider, MD   albuterol (PROVENTIL HFA,VENTOLIN HFA) 90 mcg/act inhaler Inhale 2 puffs every 6 (six) hours as needed for wheezing 3/25/22   David Shelley MD   ammonium lactate (LAC-HYDRIN) 12 % lotion APPLY TOPICALLY TO AFFECTED AREAS twice a day 12/16/21   Historical Provider, MD   apixaban (ELIQUIS) 5 mg Take 1 tablet (5 mg total) by mouth in the morning and 1 tablet (5 mg total) in the evening  5/24/22   Cristian Gutierrez MD   b complex-vitamin C-folic acid (NEPHROCAPS) 1 mg capsule Take 1 capsule by mouth daily 5/3/22 6/2/22  David Shelley MD   docusate sodium (COLACE) 100 mg capsule Take 100 mg by mouth in the morning    Historical Provider, MD   glucose blood (OneTouch Ultra) test strip Test twice daily   4/8/22   David Shelley MD   insulin lispro (HumaLOG KwikPen) 100 units/mL injection pen 3 times with meal according to sliding scale - >Blood Glucose 150 - 199: 2 Unit of Insulin, Blood Glucose 200 - 249: 4 Units of Insulin, Blood Glucose 250 - 299: 6 Units of Insulin, Blood Glucose 300 - 349: 8 Units of Insulin, Blood Glucose 350 - 399: 10 Units of Inuslin,Blood Glucose greater than or equal to 400: 12 Units of Insulin-please contact your physician 5/23/22   Leonel Blanchard MD   Insulin Pen Needle (BD Pen Needle Pilar 2nd Gen) 32G X 4 MM MISC For use with insulin pen  Pharmacy may dispense brand covered by insurance  5/23/22   Leonel Blanchard MD   Insulin Pen Needle (NovoFine Autocover) 30G X 8 MM MISC Inject under the skin daily 4/22/22   Juan Carlos Covington MD   Insulin Pen Needle 30G X 8 MM MISC 2 (two) times a day    Historical Provider, MD   Lancets (onetouch ultrasoft) lancets Use once daily 4/8/22   Juan Carlos Covington MD   metoprolol tartrate (LOPRESSOR) 50 mg tablet Take 1 tablet (50 mg total) by mouth every 12 (twelve) hours 5/24/22   Lexa Riggs MD   montelukast (SINGULAIR) 10 mg tablet Take 10 mg by mouth daily      Historical Provider, MD   nystatin (MYCOSTATIN) powder Apply topically 2 (two) times a day    Historical Provider, MD   pregabalin (LYRICA) 100 mg capsule Take 100 mg by mouth 2 (two) times a day     Historical Provider, MD   rosuvastatin (CRESTOR) 10 MG tablet 10 mg daily   8/29/19   Historical Provider, MD   sodium chloride (OCEAN) 0 65 % nasal spray 1 spray into each nostril every hour as needed for congestion 5/23/22   Leonel Blanchard MD   torsemide (DEMADEX) 20 mg tablet Take 1 tablet (20 mg total) by mouth every morning 5/24/22 6/23/22  MD Lev Copeland SoloStar 300 units/mL CONCENTRATED U-300 injection pen (1-unit dial) Inject 25 Units under the skin daily at bedtime 5/23/22   Leonel Blanchard MD   Trulicity 1 5 PO/4 1WB SOPN inject 0 5 milliliter subcutaneously every week 28 12/16/21   Historical Provider, MD     I have reviewed home medications using recent Epic encounter  Allergies:    Allergies   Allergen Reactions    Penicillins Hives    Moxifloxacin Other (See Comments)     unknown    Percocet [Oxycodone-Acetaminophen] Other (See Comments)     unknown    Zinc Acetate Other (See Comments)     unknown    Asa [Aspirin] GI Intolerance    Indocin [Indomethacin] Other (See Comments)     Made patient "loopy"    Other Other (See Comments)     unknown       Social History:  Marital Status: Single   Occupation:   Patient Pre-hospital Living Situation: Home  Patient Pre-hospital Level of Mobility: walks  Patient Pre-hospital Diet Restrictions: low salt   Substance Use History:   Social History     Substance and Sexual Activity   Alcohol Use Not Currently    Comment: rarely     Social History     Tobacco Use   Smoking Status Former Smoker    Packs/day: 1 00    Years: 20 00    Pack years: 20     Types: Cigarettes    Quit date: 1996    Years since quittin 9   Smokeless Tobacco Never Used     Social History     Substance and Sexual Activity   Drug Use No       Family History:  Family History   Problem Relation Age of Onset    Heart disease Mother     Emphysema Maternal Grandmother     Heart disease Family        Physical Exam:     Vitals:   Blood Pressure: 127/59 (22 1429)  Pulse: 77 (22 1429)  Temperature: 98 7 °F (37 1 °C) (22 1429)  Temp Source: Oral (22 1429)  Respirations: 20 (22 1429)  SpO2: 91 % (22 1429)    Physical Exam  Vitals and nursing note reviewed  Constitutional:       General: She is not in acute distress  Appearance: Normal appearance  She is not ill-appearing  HENT:      Head: Normocephalic and atraumatic  Cardiovascular:      Rate and Rhythm: Normal rate and regular rhythm  Heart sounds: No murmur heard  Pulmonary:      Effort: Pulmonary effort is normal  No respiratory distress  Breath sounds: No wheezing  Comments: Diminished at bases b/l   Abdominal:      General: Abdomen is flat  Palpations: Abdomen is soft     Musculoskeletal:         General: Swelling present  Right lower leg: Edema present  Left lower leg: Edema present  Neurological:      General: No focal deficit present  Mental Status: She is alert and oriented to person, place, and time  Psychiatric:         Mood and Affect: Mood normal          Behavior: Behavior normal           Additional Data:     Lab Results:  Results from last 7 days   Lab Units 06/04/22  1547   WBC Thousand/uL 8 08   HEMOGLOBIN g/dL 11 0*   HEMATOCRIT % 35 8   PLATELETS Thousands/uL 181   NEUTROS PCT % 58   LYMPHS PCT % 25   MONOS PCT % 10   EOS PCT % 6     Results from last 7 days   Lab Units 06/04/22  1547   SODIUM mmol/L 139   POTASSIUM mmol/L 4 5   CHLORIDE mmol/L 98   CO2 mmol/L 32   BUN mg/dL 69*   CREATININE mg/dL 1 62*   ANION GAP mmol/L 9   CALCIUM mg/dL 8 8   ALBUMIN g/dL 3 8   TOTAL BILIRUBIN mg/dL 0 37   ALK PHOS U/L 61   ALT U/L 12   AST U/L 15   GLUCOSE RANDOM mg/dL 196*     Results from last 7 days   Lab Units 06/04/22  1547   INR  1 25*                   Imaging: Reviewed radiology reports from this admission including: chest xray  XR chest 2 views   ED Interpretation by Ebenezer Mcdowell PA-C (06/04 1651)   Mild signs of vascular congestion            EKG and Other Studies Reviewed on Admission:   · EKG: NSR  HR 75     ** Please Note: This note has been constructed using a voice recognition system   **

## 2022-06-04 NOTE — ASSESSMENT & PLAN NOTE
Wt Readings from Last 3 Encounters:   05/31/22 116 kg (255 lb)   05/26/22 114 kg (252 lb)   05/24/22 113 kg (249 lb)     · POA with increased weight gain, SOB and worsening LE edema   · Compliant with diuretics; no dietary indiscretion  Denies episodes of afib since her recent ED visit     · Start lasix 60mg BID   · I/Os, daily weights  · Low salt diet  · Cardiology consult

## 2022-06-04 NOTE — ASSESSMENT & PLAN NOTE
Lab Results   Component Value Date    HGBA1C 6 8 (H) 03/09/2022       No results for input(s): POCGLU in the last 72 hours      Blood Sugar Average: Last 72 hrs:  resume lantus 25units at night  ISS and accuchecks

## 2022-06-04 NOTE — ED PROVIDER NOTES
History  Chief Complaint   Patient presents with    Shortness of Breath    Edema     Patient arrives to the ER from home with complaints of shortness of breath and fluid retention  Hx of CHF  - fever - CP  Pt  Takes 60mg daily lasix (recent increase) after seen in another ER  82-year-old female presents the emergency department with complaints of shortness of breath  States she has had worsening shortness of breath over the past several days  History of congestive heart failure  Recently had her diuretic medications adjusted  States she has continued to have some difficulty breathing with ambulation despite oxygen to home  Noted her oxygen saturations to be in the 80s despite use of 2 L nasal cannula earlier today  Some centralized chest discomfort without associated nausea or diaphoresis  No fevers at home  Reports slight cough without sputum production  Additionally reports weight gain of 10lbs in 5 days with loss of 1lb since yesterday  History provided by:  Patient   used: No    Shortness of Breath  Severity:  Moderate  Onset quality:  Gradual  Timing:  Constant  Progression:  Waxing and waning  Chronicity:  New  Relieved by:  Nothing  Ineffective treatments:  None tried  Associated symptoms: cough    Associated symptoms: no abdominal pain, no chest pain, no claudication, no diaphoresis, no ear pain, no fever, no headaches, no hemoptysis, no neck pain, no PND, no rash, no sore throat, no sputum production, no syncope, no swollen glands, no vomiting and no wheezing        Prior to Admission Medications   Prescriptions Last Dose Informant Patient Reported? Taking? Insulin Pen Needle (BD Pen Needle Pilar 2nd Gen) 32G X 4 MM MISC   No No   Sig: For use with insulin pen  Pharmacy may dispense brand covered by insurance     Insulin Pen Needle (NovoFine Autocover) 30G X 8 MM MISC  Self No No   Sig: Inject under the skin daily   Insulin Pen Needle 30G X 8 MM MISC   Yes No Si (two) times a day   Lancets (onetouch ultrasoft) lancets  Self No No   Sig: Use once daily   Toujeo SoloStar 300 units/mL CONCENTRATED U-300 injection pen (1-unit dial)   No No   Sig: Inject 25 Units under the skin daily at bedtime   Trulicity 1 5 RC/4 7HK SOPN  Self Yes No   Sig: inject 0 5 milliliter subcutaneously every week 28   acetaminophen (TYLENOL) 325 mg tablet  Self Yes No   Sig: Take 650 mg by mouth every 6 (six) hours as needed for mild pain     albuterol (PROVENTIL HFA,VENTOLIN HFA) 90 mcg/act inhaler  Self No No   Sig: Inhale 2 puffs every 6 (six) hours as needed for wheezing   ammonium lactate (LAC-HYDRIN) 12 % lotion  Self Yes No   Sig: APPLY TOPICALLY TO AFFECTED AREAS twice a day   apixaban (ELIQUIS) 5 mg   No No   Sig: Take 1 tablet (5 mg total) by mouth in the morning and 1 tablet (5 mg total) in the evening  b complex-vitamin C-folic acid (NEPHROCAPS) 1 mg capsule   No No   Sig: Take 1 capsule by mouth daily   docusate sodium (COLACE) 100 mg capsule  Self Yes No   Sig: Take 100 mg by mouth in the morning   glucose blood (OneTouch Ultra) test strip  Self No No   Sig: Test twice daily     insulin lispro (HumaLOG KwikPen) 100 units/mL injection pen   No No   Sig: 3 times with meal according to sliding scale - >Blood Glucose 150 - 199: 2 Unit of Insulin, Blood Glucose 200 - 249: 4 Units of Insulin, Blood Glucose 250 - 299: 6 Units of Insulin, Blood Glucose 300 - 349: 8 Units of Insulin, Blood Glucose 350 - 399: 10 Units of Inuslin,Blood Glucose greater than or equal to 400: 12 Units of Insulin-please contact your physician   metoprolol tartrate (LOPRESSOR) 50 mg tablet   No No   Sig: Take 1 tablet (50 mg total) by mouth every 12 (twelve) hours   montelukast (SINGULAIR) 10 mg tablet  Self Yes No   Sig: Take 10 mg by mouth daily     nystatin (MYCOSTATIN) powder  Self Yes No   Sig: Apply topically 2 (two) times a day   pregabalin (LYRICA) 100 mg capsule  Self Yes No   Sig: Take 100 mg by mouth 2 (two) times a day    rosuvastatin (CRESTOR) 10 MG tablet  Self Yes No   Sig: 10 mg daily     sodium chloride (OCEAN) 0 65 % nasal spray   No No   Si spray into each nostril every hour as needed for congestion   torsemide (DEMADEX) 20 mg tablet   No No   Sig: Take 1 tablet (20 mg total) by mouth every morning      Facility-Administered Medications: None       Past Medical History:   Diagnosis Date    Anemia     Asthma     CHF (congestive heart failure) ([facility] [address] [facility]     COVID-19     2022    GERD (gastroesophageal reflux disease)     Hyperlipidemia     Kidney stones     PONV (postoperative nausea and vomiting)     [facility]        Past Surgical History:   Procedure Laterality Date    APPENDECTOMY      CYSTOSCOPY W/ LASER LITHOTRIPSY Left 2016    Procedure: CYSTOSCOPY URETEROSCOPY WITH LITHOTRIPSY HOLMIUM LASER, RETROGRADE PYELOGRAM AND INSERTION STENT URETERAL;  Surgeon: Andrew Elder MD;  Location: [address] [place] [address];  Service:     DILATION AND CURETTAGE OF UTERUS      IR THORACENTESIS  3/18/2022    JOINT REPLACEMENT      right knee    KNEE ARTHROPLASTY Right     GA CYSTOURETHROSCOPY,URETER CATHETER Left 2016    Procedure: CYSTOSCOPY RETROGRADE PYELOGRAM WITH INSERTION STENT URETERAL, left;  Surgeon: Andrew Elder MD;  Location: [address] [place] [address];  Service: Urology    GA RECONSTR TOTAL SHOULDER IMPLANT Left 3/1/2022    Procedure: ARTHROPLASTY SHOULDER REVERSE;  Surgeon: Sada Dutta MD;  Location: [facility];  Service: Orthopedics    SHOULDER ARTHROTOMY Left        Family History   Problem Relation Age of Onset    Heart disease Mother     Emphysema Maternal Grandmother     Heart disease Family      I have reviewed and agree with the history as documented      E-Cigarette/Vaping    E-Cigarette Use Never User      E-Cigarette/Vaping Substances     Social History     Tobacco Use    Smoking status: Former Smoker     Packs/day: 1 00 Years: 20 00     Pack years: 20 00     Types: Cigarettes     Quit date: 1996     Years since quittin 9    Smokeless tobacco: Never Used   Vaping Use    Vaping Use: Never used   Substance Use Topics    Alcohol use: Not Currently     Comment: rarely    Drug use: No       Review of Systems   Constitutional: Negative for activity change, appetite change, chills, diaphoresis and fever  HENT: Negative for congestion, dental problem, drooling, ear discharge, ear pain, mouth sores, nosebleeds, rhinorrhea, sore throat and trouble swallowing  Eyes: Negative for pain, discharge and itching  Respiratory: Positive for cough, chest tightness and shortness of breath  Negative for hemoptysis, sputum production and wheezing  Cardiovascular: Positive for leg swelling  Negative for chest pain, palpitations, claudication, syncope and PND  Gastrointestinal: Negative for abdominal pain, blood in stool, constipation, diarrhea, nausea and vomiting  Endocrine: Negative for cold intolerance and heat intolerance  Genitourinary: Negative for difficulty urinating, dysuria, flank pain, frequency and urgency  Musculoskeletal: Negative for neck pain  Skin: Negative for rash and wound  Allergic/Immunologic: Negative for food allergies and immunocompromised state  Neurological: Negative for dizziness, seizures, syncope, weakness, numbness and headaches  Psychiatric/Behavioral: Negative for agitation, behavioral problems and confusion  Physical Exam  Physical Exam  Vitals and nursing note reviewed  Constitutional:       General: She is not in acute distress  Appearance: She is not diaphoretic  HENT:      Head: Normocephalic and atraumatic  Right Ear: External ear normal       Left Ear: External ear normal    Eyes:      Conjunctiva/sclera: Conjunctivae normal    Neck:      Vascular: No JVD  Trachea: No tracheal deviation     Cardiovascular:      Rate and Rhythm: Normal rate and regular rhythm  Heart sounds: Normal heart sounds  No murmur heard  No friction rub  No gallop  Pulmonary:      Effort: No respiratory distress  Breath sounds: Decreased breath sounds and rales present  No wheezing  Chest:      Chest wall: No tenderness  Abdominal:      General: Bowel sounds are normal  There is no distension  Palpations: Abdomen is soft  Tenderness: There is no abdominal tenderness  There is no guarding  Musculoskeletal:         General: No tenderness or deformity  Normal range of motion  Right lower le+ Edema present  Left lower le+ Edema present  Lymphadenopathy:      Cervical: No cervical adenopathy  Skin:     General: Skin is warm and dry  Findings: No erythema or rash  Neurological:      General: No focal deficit present  Mental Status: She is alert and oriented to person, place, and time  Psychiatric:         Mood and Affect: Mood normal          Behavior: Behavior normal          Vital Signs  ED Triage Vitals [22 1429]   Temperature Pulse Respirations Blood Pressure SpO2   98 7 °F (37 1 °C) 77 20 127/59 91 %      Temp Source Heart Rate Source Patient Position - Orthostatic VS BP Location FiO2 (%)   Oral Monitor Sitting Left arm --      Pain Score       --           Vitals:    22 1429   BP: 127/59   Pulse: 77   Patient Position - Orthostatic VS: Sitting         Visual Acuity      ED Medications  Medications - No data to display    Diagnostic Studies  Results Reviewed     Procedure Component Value Units Date/Time    HS Troponin I 2hr [[id number]] Collected: 22 1749    Lab Status:  In process Specimen: Blood from Arm, Right Updated: 22    B-Type Natriuretic Peptide(BNP) AN, CA, EA Campuses Only [[id number]]  (Normal) Collected: 22 1547    Lab Status: Final result Specimen: Blood Updated: 22 170     BNP 74 pg/mL     HS Troponin I 4hr [[id number]]     Lab Status: No result Specimen: Blood     HS Troponin 0hr (reflex protocol) [[id number]]  (Normal) Collected: 06/04/22 1547    Lab Status: Final result Specimen: Blood Updated: 06/04/22 1623     hs TnI 0hr 6 ng/L     Comprehensive metabolic panel [[id number]]  (Abnormal) Collected: 06/04/22 1547    Lab Status: Final result Specimen: Blood Updated: 06/04/22 1607     Sodium 139 mmol/L      Potassium 4 5 mmol/L      Chloride 98 mmol/L      CO2 32 mmol/L      ANION GAP 9 mmol/L      BUN 69 mg/dL      Creatinine 1 62 mg/dL      Glucose 196 mg/dL      Calcium 8 8 mg/dL      AST 15 U/L      ALT 12 U/L      Alkaline Phosphatase 61 U/L      Total Protein 7 6 g/dL      Albumin 3 8 g/dL      Total Bilirubin 0 37 mg/dL      eGFR 32 ml/min/1 73sq m     Narrative:      National Kidney Disease Foundation guidelines for Chronic Kidney Disease (CKD):     Stage 1 with normal or high GFR (GFR > 90 mL/min/1 73 square meters)    Stage 2 Mild CKD (GFR = 60-89 mL/min/1 73 square meters)    Stage 3A Moderate CKD (GFR = 45-59 mL/min/1 73 square meters)    Stage 3B Moderate CKD (GFR = 30-44 mL/min/1 73 square meters)    Stage 4 Severe CKD (GFR = 15-29 mL/min/1 73 square meters)    Stage 5 End Stage CKD (GFR <15 mL/min/1 73 square meters)  Note: GFR calculation is accurate only with a steady state creatinine    Protime-INR [[id number]]  (Abnormal) Collected: 06/04/22 1547    Lab Status: Final result Specimen: Blood Updated: 06/04/22 1604     Protime 15 7 seconds      INR 1 25    APTT [[id number]]  (Normal) Collected: 06/04/22 1547    Lab Status: Final result Specimen: Blood Updated: 06/04/22 1604     PTT 31 seconds     CBC and differential [[id number]]  (Abnormal) Collected: 06/04/22 1547    Lab Status: Final result Specimen: Blood Updated: 06/04/22 1552     WBC 8 08 Thousand/uL      RBC 3 90 Million/uL      Hemoglobin 11 0 g/dL      Hematocrit 35 8 %      MCV 92 fL      MCH 28 2 pg      MCHC 30 7 g/dL      RDW 14 5 %      MPV 11 2 fL      Platelets 595 Thousands/uL      nRBC 0 /100 WBCs      Neutrophils Relative 58 %      Immat GRANS % 0 %      Lymphocytes Relative 25 %      Monocytes Relative 10 %      Eosinophils Relative 6 %      Basophils Relative 1 %      Neutrophils Absolute 4 66 Thousands/µL      Immature Grans Absolute 0 03 Thousand/uL      Lymphocytes Absolute 2 05 Thousands/µL      Monocytes Absolute 0 77 Thousand/µL      Eosinophils Absolute 0 50 Thousand/µL      Basophils Absolute 0 07 Thousands/µL                  XR chest 2 views   ED Interpretation by Sid Martinez PA-C (06/04 1651)   Mild signs of vascular congestion                   Procedures  ECG 12 Lead Documentation Only    Date/Time: 6/4/2022 5:44 PM  Performed by: Sid Martinez PA-C  Authorized by: Sid Martinez PA-C     Indications / Diagnosis:  SOB  ECG reviewed by me, the ED Provider: yes    Patient location:  ED  Previous ECG:     Previous ECG:  Compared to current    Comparison ECG info:  5/31/22    Similarity:  No change  Interpretation:     Interpretation: non-specific    Rate:     ECG rate:  76    ECG rate assessment: normal    Rhythm:     Rhythm: sinus rhythm    Ectopy:     Ectopy: none    QRS:     QRS axis:  Left    QRS intervals:  Normal  Conduction:     Conduction: normal    ST segments:     ST segments:  Normal  T waves:     T waves: normal               ED Course             HEART Risk Score    Flowsheet Row Most Recent Value   Heart Score Risk Calculator    History 1 Filed at: 06/04/2022 1738   ECG 0 Filed at: 06/04/2022 1738   Age 2 Filed at: 06/04/2022 1738   Risk Factors 1 Filed at: 06/04/2022 1738   Troponin 0 Filed at: 06/04/2022 1738   HEART Score 4 Filed at: 06/04/2022 1738                        SBIRT 20yo+    Flowsheet Row Most Recent Value   SBIRT (25 yo +)    In order to provide better care to our patients, we are screening all of our patients for alcohol and drug use  Would it be okay to ask you these screening questions?  Unable to answer at this time Filed at: 06/04/2022 0768 MDM  Number of Diagnoses or Management Options  Diagnosis management comments: Differential diagnosis includes but not limited to:  ACS, CHF, pneumonia, medication noncompliance         Amount and/or Complexity of Data Reviewed  Clinical lab tests: ordered and reviewed  Tests in the radiology section of CPT®: ordered and reviewed  Independent visualization of images, tracings, or specimens: yes        Disposition  Final diagnoses:   NAILS (dyspnea on exertion)   Hypoxia     Time reflects when diagnosis was documented in both MDM as applicable and the Disposition within this note     Time User Action Codes Description Comment    6/4/2022  6:05 PM Ajay Knapp Add [R06 00] NAILS (dyspnea on exertion)     6/4/2022  6:05 PM Schuett, Boneta Boas Add [R09 02] Hypoxia       ED Disposition     ED Disposition   Admit    Condition   Stable    Date/Time   Sat Jun 4, 2022  6:07 PM    Comment   Case was discussed with ENE and the patient's admission status was agreed to be Admission Status: inpatient status to the service of Dr Michelle Ernandez   Follow-up Information    None         Patient's Medications   Discharge Prescriptions    No medications on file       No discharge procedures on file      PDMP Review       Value Time User    PDMP Reviewed  Yes 3/12/2022 10:25 AM Caretha Nyhan, CRNP          ED Provider  Electronically Signed by           Brooke Brumfield PA-C  06/04/22 8125

## 2022-06-05 LAB
ANION GAP SERPL CALCULATED.3IONS-SCNC: 5 MMOL/L (ref 4–13)
ATRIAL RATE: 78 BPM
BUN SERPL-MCNC: 68 MG/DL (ref 5–25)
CALCIUM SERPL-MCNC: 8.6 MG/DL (ref 8.4–10.2)
CHLORIDE SERPL-SCNC: 100 MMOL/L (ref 96–108)
CO2 SERPL-SCNC: 36 MMOL/L (ref 21–32)
CREAT SERPL-MCNC: 1.64 MG/DL (ref 0.6–1.3)
GFR SERPL CREATININE-BSD FRML MDRD: 31 ML/MIN/1.73SQ M
GLUCOSE SERPL-MCNC: 215 MG/DL (ref 65–140)
GLUCOSE SERPL-MCNC: 222 MG/DL (ref 65–140)
GLUCOSE SERPL-MCNC: 224 MG/DL (ref 65–140)
GLUCOSE SERPL-MCNC: 269 MG/DL (ref 65–140)
GLUCOSE SERPL-MCNC: 288 MG/DL (ref 65–140)
MAGNESIUM SERPL-MCNC: 2 MG/DL (ref 1.9–2.7)
P AXIS: 63 DEGREES
POTASSIUM SERPL-SCNC: 4.3 MMOL/L (ref 3.5–5.3)
PR INTERVAL: 192 MS
QRS AXIS: -36 DEGREES
QRSD INTERVAL: 70 MS
QT INTERVAL: 362 MS
QTC INTERVAL: 405 MS
SODIUM SERPL-SCNC: 141 MMOL/L (ref 135–147)
T WAVE AXIS: 33 DEGREES
VENTRICULAR RATE: 75 BPM

## 2022-06-05 PROCEDURE — 99232 SBSQ HOSP IP/OBS MODERATE 35: CPT | Performed by: INTERNAL MEDICINE

## 2022-06-05 PROCEDURE — 83735 ASSAY OF MAGNESIUM: CPT | Performed by: HOSPITALIST

## 2022-06-05 PROCEDURE — 93010 ELECTROCARDIOGRAM REPORT: CPT | Performed by: INTERNAL MEDICINE

## 2022-06-05 PROCEDURE — 80048 BASIC METABOLIC PNL TOTAL CA: CPT | Performed by: HOSPITALIST

## 2022-06-05 PROCEDURE — 82948 REAGENT STRIP/BLOOD GLUCOSE: CPT

## 2022-06-05 PROCEDURE — 99223 1ST HOSP IP/OBS HIGH 75: CPT | Performed by: INTERNAL MEDICINE

## 2022-06-05 RX ORDER — ACETAMINOPHEN 325 MG/1
975 TABLET ORAL EVERY 8 HOURS PRN
Status: DISCONTINUED | OUTPATIENT
Start: 2022-06-05 | End: 2022-06-12 | Stop reason: HOSPADM

## 2022-06-05 RX ADMIN — APIXABAN 5 MG: 5 TABLET, FILM COATED ORAL at 08:59

## 2022-06-05 RX ADMIN — METOPROLOL TARTRATE 50 MG: 50 TABLET, FILM COATED ORAL at 08:59

## 2022-06-05 RX ADMIN — INSULIN LISPRO 4 UNITS: 100 INJECTION, SOLUTION INTRAVENOUS; SUBCUTANEOUS at 21:24

## 2022-06-05 RX ADMIN — INSULIN LISPRO 2 UNITS: 100 INJECTION, SOLUTION INTRAVENOUS; SUBCUTANEOUS at 17:43

## 2022-06-05 RX ADMIN — INSULIN LISPRO 2 UNITS: 100 INJECTION, SOLUTION INTRAVENOUS; SUBCUTANEOUS at 08:59

## 2022-06-05 RX ADMIN — FUROSEMIDE 60 MG: 10 INJECTION, SOLUTION INTRAMUSCULAR; INTRAVENOUS at 17:43

## 2022-06-05 RX ADMIN — APIXABAN 5 MG: 5 TABLET, FILM COATED ORAL at 17:43

## 2022-06-05 RX ADMIN — PRAVASTATIN SODIUM 80 MG: 80 TABLET ORAL at 17:43

## 2022-06-05 RX ADMIN — METOPROLOL TARTRATE 50 MG: 50 TABLET, FILM COATED ORAL at 21:24

## 2022-06-05 RX ADMIN — MONTELUKAST 10 MG: 10 TABLET, FILM COATED ORAL at 08:59

## 2022-06-05 RX ADMIN — FUROSEMIDE 60 MG: 10 INJECTION, SOLUTION INTRAMUSCULAR; INTRAVENOUS at 08:58

## 2022-06-05 RX ADMIN — PREGABALIN 100 MG: 100 CAPSULE ORAL at 17:43

## 2022-06-05 RX ADMIN — INSULIN GLARGINE 25 UNITS: 100 INJECTION, SOLUTION SUBCUTANEOUS at 21:24

## 2022-06-05 RX ADMIN — ACETAMINOPHEN 975 MG: 325 TABLET ORAL at 22:01

## 2022-06-05 RX ADMIN — INSULIN LISPRO 3 UNITS: 100 INJECTION, SOLUTION INTRAVENOUS; SUBCUTANEOUS at 11:29

## 2022-06-05 RX ADMIN — PREGABALIN 100 MG: 100 CAPSULE ORAL at 08:59

## 2022-06-05 NOTE — ASSESSMENT & PLAN NOTE
Wt Readings from Last 3 Encounters:   06/05/22 114 kg (251 lb 3 1 oz)   05/31/22 116 kg (255 lb)   05/26/22 114 kg (252 lb)     · POA with increased weight gain, SOB and worsening LE edema   · Compliant with diuretics; no dietary indiscretion  Denies episodes of afib since her recent ED visit  · At recent ED visit, Torsemide inc from 20 mg to 60 mg daily     · Dry Wt = 244    Plan:  · Cont lasix 60mg BID   · I/Os, daily weights  · Low salt diet, FR 1800   · Cardiology consult

## 2022-06-05 NOTE — ASSESSMENT & PLAN NOTE
· Mildly hypoxic on admission; wears nocturnal O2 for KATRINA, 2L O2    · Currently, on 1 5L satting high 80s     Plan:  · Monitor resp status   · Wean with diuresis - maintain Sat >89%  · Albuterol PRN

## 2022-06-05 NOTE — CONSULTS
Consultation - Cardiology   Lorie Hugo 73 y o  female MRN: [id number]  Unit/Bed#: S -01 Encounter: [id number]    Inpatient consult to Cardiology  Consult performed by: Booker Barrios PA-C  Consult ordered by: Jacob Serna MD            Physician Requesting Consult: Veronica Butler MD  Reason for Consult / Principal Problem: acute on chronic CHF    Assessment/Plan:  1  Acute on chronic diastolic congestive heart failure   - patient presents with worsening shortness of breath and decrease urinary output despite increase in torsemide dose from 20 mg daily to 60 mg daily 5 days ago   - will attempt to diurese with IV lasix at 60 mg BID as ordered by the primary team and assess response   - she reports dry weight is 244 lbs, standing weight this  lbs   - continue low sodium diet, will add fluid restriction   - monitor daily BMP   - likely will need to go home on increased dose of torsemide +/- metolazone    2  Paroxysmal atrial fibrillation   - currently maintaining sinus rhythm    - continue metoprolol tartrate 50 mg BID   - continue anticoagulation with Eliquis 5 mg BID    3  Hypertension    - acceptably controlled this AM   - monitor with diuresis    History of Present Illness   HPI: Lorie Hugo is a 73y o  year old female with chronic diastolic congestive heart failure, paroxysmal atrial fibrillation, paroxysmal supraventricular tachycardia, hypertension, dyslipidemia, diabetes, obstructive sleep apnea, CKD who follows with Dr Ricardo Fountain  The patient presented to the emergency department yesterday for the evaluation of shortness of breath  The patient was hospitalized here 2 weeks ago with atrial fibrillation with rapid ventricular response  At that time she was diuresed  She was placed on torsemide 20 mg once daily at discharge  After that she gained 10 lb, from 244 lb to 254 lb on her home scale  She did present to the ER at the [address] [place] [address] on 05/31/2022    At that time her primary cardiologist to see her who recommended increasing her torsemide to 60 mg daily  She states her weight did come down a few lb with this although she did not feel she was urinating much  She continued to feel short of breath with ambulation  Her oxygen saturations were noted to be in the 80s  Typically she utilizes oxygen at nighttime only but has had to utilize oxygen during the day as a result  She denies any chest pain/pressure/discomfort  She denies lightheadedness, dizziness, palpitations and syncope  She denies dietary indiscretion and reports staying under 2 g of sodium daily  On admission she underwent a chest x-ray which is unavailable for me to review at this time and is pending formal read  Her BNP was 120  She was started on Lasix 60 mg IV b i d  But did not receive any doses yet  Cardiology was consulted for further evaluation and management  Review of Systems   Cardiovascular: Positive for dyspnea on exertion, leg swelling and orthopnea  Negative for chest pain, palpitations and paroxysmal nocturnal dyspnea  All other systems reviewed and are negative      Historical Information   Past Medical History:   Diagnosis Date    Anemia     Asthma     CHF [facility]congestive heart failure) ([facility] [address] )     COVID-19     January 2022    GERD (gastroesophageal reflux disease)     Hyperlipidemia     Kidney stones     PONV (postoperative nausea and vomiting)     [facility]ricular tachycardia[facility])      Past Surgical History:   Procedure Laterality Date    APPENDECTOMY      CYSTOSCOPY W/ LASER LITHOTRIPSY Left 7/12/2016    Procedure: CYSTOSCOPY URETEROSCOPY WITH LITHOTRIPSY HOLMIUM LASER, RETROGRADE PYELOGRAM AND INSERTION STENT URETERAL;  Surgeon: Kade Banks MD;  Location: [address] [place] [address];  Service:     DILATION AND CURETTAGE OF UTERUS      IR THORACENTESIS  3/18/2022    JOINT REPLACEMENT      right knee    KNEE ARTHROPLASTY Right     [place] CYSTOURETHROSCOPY,URETER CATHETER Left 6/29/2016 Procedure: CYSTOSCOPY RETROGRADE PYELOGRAM WITH INSERTION STENT URETERAL, left;  Surgeon: Rosalie Villar MD;  Location: [address] [place] [address];  Service: Urology    VA RECONSTR TOTAL SHOULDER IMPLANT Left 3/1/2022    Procedure: ARTHROPLASTY SHOULDER REVERSE;  Surgeon: Rhoda Sevilla MD;  [facility];  Service: Orthopedics    SHOULDER ARTHROTOMY Left      Social History     Substance and Sexual Activity   Alcohol Use Not Currently    Comment: rarely     Social History     Substance and Sexual Activity   Drug Use No     Social History     Tobacco Use   Smoking Status Former Smoker    Packs/day:     Years: 20     Pack years: 20     Types: Cigarettes    Quit date: 1996    Years since quittin 9   Smokeless Tobacco Never Used     Family History: non-contributory    Meds/Allergies   all current active meds have been reviewed       Allergies   Allergen Reactions    Penicillins Hives    Moxifloxacin Other (See Comments)     unknown    Percocet [Oxycodone-Acetaminophen] Other (See Comments)     unknown    Zinc Acetate Other (See Comments)     unknown    Asa [Aspirin] GI Intolerance    Indocin [Indomethacin] Other (See Comments)     Made patient "loopy"    Other Other (See Comments)     unknown       Objective   Vitals: Blood pressure 163/72, pulse 65, temperature 98 6 °F (37 °C), temperature source Oral, resp   rate 18, height 5' 2" (1 575 m), weight 114 kg (251 lb 3 1 oz), SpO2 99 %, not currently breastfeeding , Body mass index is 45 94 kg/m² ,   Orthostatic Blood Pressures    Flowsheet Row Most Recent Value   Blood Pressure 163/72 filed at 2022 1946   Patient Position - Orthostatic VS Sitting filed at 2022 2741        Systolic (82LUU), GMQ:822 , Min:127 , GZN:682     Diastolic (19VWR), YPB:22, Min:59, Max:72      Intake/Output Summary (Last 24 hours) at 2022 0720  Last data filed at 2022 0601  Gross per 24 hour   Intake --   Output 850 ml   Net -850 ml Weight (last 2 days)     Date/Time Weight    06/05/22 0600 114 (251 19)    06/04/22 1946 114 (251 2)          Invasive Devices  Report    Peripheral Intravenous Line  Duration           Peripheral IV 05/31/22 Right Antecubital 4 days    Peripheral IV 06/04/22 Proximal;Right;Ventral (anterior) Antecubital <1 day                  Physical Exam  Vitals reviewed  Constitutional:       General: She is not in acute distress  Appearance: She is obese  She is not diaphoretic  Interventions: Nasal cannula in place  HENT:      Head: Normocephalic and atraumatic  Eyes:      Pupils: Pupils are equal, round, and reactive to light  Neck:      Vascular: No carotid bruit  Cardiovascular:      Rate and Rhythm: Normal rate and regular rhythm  Heart sounds: S1 normal and S2 normal  No murmur heard  Pulmonary:      Effort: Pulmonary effort is normal  No respiratory distress  Breath sounds: Examination of the right-lower field reveals rales  Examination of the left-lower field reveals rales  Rales present  No wheezing  Abdominal:      General: There is no distension  Palpations: Abdomen is soft  Tenderness: There is no abdominal tenderness  Musculoskeletal:         General: No deformity  Normal range of motion  Cervical back: Normal range of motion  Right lower leg: Edema (+1 bilateral lower extremity edema) present  Left lower leg: Edema present  Skin:     General: Skin is warm and dry  Findings: No erythema  Neurological:      General: No focal deficit present  Mental Status: She is alert and oriented to person, place, and time     Psychiatric:         Mood and Affect: Mood normal          Behavior: Behavior normal              Laboratory Results:        CBC with diff:   Results from last 7 days   Lab Units 06/04/22  1547 05/31/22  0927   WBC Thousand/uL 8 08 7 28   HEMOGLOBIN g/dL 11 0* 10 3*   HEMATOCRIT % 35 8 35 2   MCV fL 92 96   PLATELETS Thousands/uL 181 195   MCH pg 28 2 28 1   MCHC g/dL 30 7* 29 3*   RDW % 14 5 14 5   MPV fL 11 2 11 9   NRBC AUTO /100 WBCs 0  --          CMP:  Results from last 7 days   Lab Units 22  0345 22  1547 22  1531 22  0927   POTASSIUM mmol/L 4 3 4 5 5 3 5 3   CHLORIDE mmol/L 100 98 102 104   CO2 mmol/L 36* 32 31 25   BUN mg/dL 68* 69* 64* 59*   CREATININE mg/dL 1 64* 1 62* 1 79* 1 90*   CALCIUM mg/dL 8 6 8 8 8 3 9 2   AST U/L  --  15 27 31   ALT U/L  --  12 20 21   ALK PHOS U/L  --  61 64 73   EGFR ml/min/1 73sq m 31 32 28 26         BMP:  Results from last 7 days   Lab Units 22  0345 22  1547 22  1531 22  0927   POTASSIUM mmol/L 4 3 4 5 5 3 5 3   CHLORIDE mmol/L 100 98 102 104   CO2 mmol/L 36* 32 31 25   BUN mg/dL 68* 69* 64* 59*   CREATININE mg/dL 1 64* 1 62* 1 79* 1 90*   CALCIUM mg/dL 8 6 8 8 8 3 9 2       BNP:    Recent Labs     22  1547   BNP 74       Magnesium:   Results from last 7 days   Lab Units 22  0345   MAGNESIUM mg/dL 2 0       Coags:   Results from last 7 days   Lab Units 22  1547   PTT seconds 31   INR  1 25*       TSH:       Hemoglobin A1C       Lipid Profile:         Cardiac testing:   Results for orders placed during the hospital encounter of 19    Echo complete with contrast if indicated    Narrative  [address]  [address] [address]  [phone]    Transthoracic Echocardiogram  2D, M-mode, Doppler, and Color Doppler    Study date:  29-May-2019    Patient: Yara Kapoor  MR number: [id number]  Account number: [de-identified]  : 1950  Age: 79 years  Gender: Female  Status: Inpatient  Location: Bedside  Height: 62 in  Weight: 250 6 lb  BP: 133/ 62 mmHg    Indications: Tachycardia    Diagnoses: I11 9 - Hypertensive heart disease without heart failure    Sonographer:  QUETA Barrios  Referring Physician:  Chivo Blackburn MD  Group:  [facility]  Interpreting Physician: Janneth Quarles,     SUMMARY    LEFT VENTRICLE:  Systolic function was normal by visual assessment  Ejection fraction was estimated to be 55 %  There were no regional wall motion abnormalities  Wall thickness was mildly to moderately increased  Doppler parameters were consistent with abnormal left ventricular relaxation (grade 1 diastolic dysfunction)  MITRAL VALVE:  There was mild regurgitation  AORTIC VALVE:  There was no evidence for stenosis  There was no regurgitation  TRICUSPID VALVE:  There was mild regurgitation  Pulmonary artery systolic pressure was within the normal range  HISTORY: PRIOR HISTORY: Diabetes,Diabetes, HTN, Hyperlipidemia, A Fib  PROCEDURE: The procedure was performed at the bedside  This was a routine study  The transthoracic approach was used  The study included complete 2D imaging, M-mode, complete spectral Doppler, and color Doppler  The heart rate was 77 bpm,  at the start of the study  Images were obtained from the parasternal, apical, subcostal, and suprasternal notch acoustic windows  Echocardiographic views were limited due to restricted patient mobility, poor patient compliance, poor  acoustic window availability, decreased penetration, and lung interference  This was a technically difficult study  LEFT VENTRICLE: Size was normal  Systolic function was normal by visual assessment  Ejection fraction was estimated to be 55 %  There were no regional wall motion abnormalities  Wall thickness was mildly to moderately increased  DOPPLER:  Doppler parameters were consistent with abnormal left ventricular relaxation (grade 1 diastolic dysfunction)  RIGHT VENTRICLE: The size was normal  Systolic function was normal     LEFT ATRIUM: The atrium was mildly dilated  RIGHT ATRIUM: Size was normal     MITRAL VALVE: Valve structure was normal  There was normal leaflet separation  No echocardiographic evidence for prolapse   DOPPLER: The transmitral velocity was within the normal range  There was no evidence for stenosis  There was mild  regurgitation  AORTIC VALVE: The valve was trileaflet  Leaflets exhibited normal thickness, normal cuspal separation, and sclerosis  DOPPLER: Transaortic velocity was within the normal range  There was no evidence for stenosis  There was no  regurgitation  TRICUSPID VALVE: The valve structure was normal  There was normal leaflet separation  DOPPLER: The transtricuspid velocity was within the normal range  There was mild regurgitation  Pulmonary artery systolic pressure was within the normal  range  Estimated peak PA pressure was 32 mmHg  PULMONIC VALVE: Leaflets exhibited normal thickness, no calcification, and normal cuspal separation  DOPPLER: The transpulmonic velocity was within the normal range  There was no regurgitation  PERICARDIUM: There was no thickening  There was no pericardial effusion  AORTA: The root exhibited normal size  PULMONARY ARTERY: The size was normal  The morphology appeared normal     SYSTEM MEASUREMENT TABLES    2D mode  AoR Diam 2D: 3 6 cm  LA Diam (2D): 3 9 cm  LA/Ao (2D): 1 08  FS (2D Teich): 26 9 %  IVSd (2D): 1 29 cm  LVDEV: 75 1 cmï¾³  LVESV: 35 3 cmï¾³  LVIDd(2D): 4 12 cm  LVISd (2D): 3 01 cm  LVOT Area 2D: 3 14 cmï¾²  LVPWd (2D): 1 2 cm  SV (Teich): 39 8 cmï¾³    Apical four chamber  LVEF A4C: 51 %    Unspecified Scan Mode  HANNA Cont Eq (Peak Gutierrez): 2 58 cmï¾²  LVOT Diam : 2 cm  LVOT Vmax: 963 mm/s  LVOT Vmax; Mean: 963 mm/s  Peak Grad ; Mean: 4 mm[Hg]  MV Peak A Gutierrez: 893 mm/s  MV Peak E Gutierrez  Mean: 805 mm/s  MVA (PHT): 3 67 cmï¾²  PHT: 60 ms  Max P mm[Hg]  V Max: 2360 mm/s  Vmax: 2430 mm/s  RA Area: 12 8 cmï¾²  RA Volume: 27 6 cmï¾³  TAPSE: 2 cm    [employer] Accredited Echocardi[facility]    Prepared and electronically signed by    James Whitmore DO  Signed 29-May-2019 16:19:07    No results found for this or any previous visit      No results found for this or any previous visit  No results found for this or any previous visit  Imaging: I have personally reviewed pertinent reports  XR chest 1 view portable    Result Date: 5/31/2022  Narrative: CHEST INDICATION:   shortness of breath  COMPARISON:  5/16/2022 EXAM PERFORMED/VIEWS:  XR CHEST PORTABLE FINDINGS: Peribronchial cuffing is seen bilaterally  There is no acute infiltrate with no pneumothorax  Small amount of bilateral particularly right-sided pleural effusion is suggested  Atherosclerotic aorta is visualized associated with unchanged cardiac silhouette  Improving fullness of both hilum is visualized suggesting improving CHF  Degenerative changes is seen within the spine associated with partially imaged left shoulder prosthesis  Impression: Improving aeration compatible with improving CHF No acute infiltrate identified Workstation performed: [id number]     XR chest 1 view portable    Result Date: 5/16/2022  Narrative: CHEST INDICATION:   shortness of breath, respiratory distress  COMPARISON:  5/13/2022 EXAM PERFORMED/VIEWS:  XR CHEST PORTABLE FINDINGS: Cardiomediastinal silhouette appears mildly enlarged with mild increased vascular congestion and increased bilateral pleural effusions  No pneumothorax  Left shoulder arthroplasty again noted  Impression: Mild cardiomegaly Mild increased vascular congestion Increased bilateral pleural effusions Workstation performed: [id number]     XR chest 1 view portable    Result Date: 5/13/2022  Narrative: CHEST INDICATION:   SOB  COMPARISON:  Chest radiograph April 1, 2022 EXAM PERFORMED/VIEWS:  XR CHEST PORTABLE FINDINGS: Cardiomediastinal silhouette appears unremarkable  New mild prominence of the interstitial markings  Small bilateral pleural effusions  No pneumothorax Left shoulder arthroplasty  Impression: Mild pulmonary vascular congestion with small bilateral pleural effusions   Workstation performed: [id number]     CT head without contrast    Result Date: 2022  Narrative: CT BRAIN - WITHOUT CONTRAST INDICATION:   Mental status change, unknown cause altered mental status  COMPARISON:  2022 TECHNIQUE:  CT examination of the brain was performed  In addition to axial images, sagittal and coronal 2D reformatted images were created and submitted for interpretation  Radiation dose length product (DLP) for this visit:  912 34 mGy-cm   This examination, like all CT scans performed in the [facility], was performed utilizing techniques to minimize radiation dose exposure, including the use of iterative  reconstruction and automated exposure control  IMAGE QUALITY:  Diagnostic  FINDINGS: PARENCHYMA: Decreased attenuation is noted in periventricular and subcortical white matter demonstrating an appearance that is statistically most likely to represent mild microangiopathic change  No CT signs of acute infarction  No intracranial mass, mass effect or midline shift  No acute parenchymal hemorrhage  VENTRICLES AND EXTRA-AXIAL SPACES:  Normal for the patient's age  VISUALIZED ORBITS AND PARANASAL SINUSES:  There is a left mastoid effusion, not present previously  There is mild left sphenoid sinus and ethmoid air cell mucosal thickening  CALVARIUM AND EXTRACRANIAL SOFT TISSUES:  Normal      Impression: 1  No acute intracranial abnormality  2   New left mastoid effusion  Correlation for acute mastoiditis recommended  Workstation performed: [id number]     Bedside spirometry with bronchodilator Pre/Post    Result Date: 2022  Narrative: Pulmonary Function Test Report Jackelyn Chavez 73 y o  female MRN: [id number] Date of Testin2022 Date of Interpretation: 2022 Requesting Physician: Dr Mandeep Cunningham Reason for Testing: SOB Reference set for interpretation:  Procedure: Testing was completed bedside  The patient demonstrated good effort and cooperation  The results of this test meet ATS standards for acceptability and repeatability    Data set appears appropriate for interpretation  Post bronchodilator testing performed after the administration of 2 5mg albuterol in 3cc normal saline administered via nebulizer per bronchodilator protocol  Results: FEV1/FVC Ratio: 73 % Forced Vital Capacity: 0 99 L 36 % predicted FEV1: 0 72 L 34 % predicted Interpretation: · Spirometry suggests abnormal lung volumes  Recommend checking lung volumes for further evaluation  · No significant response to the administration to bronchodilator per ATS standards · Flow volume loop is restricted  [facility]       EKG reviewed personally: normal sinus rhythm, LAD, low voltage  Telemetry reviewed personally: patient is not on telemetry    Assessment:  Principal Problem:    Acute on chronic diastolic CHF (congestive heart failure) ([facility])  Active Problems:    Paroxysmal atrial fibrillation ([facility])    Diabetes mellitus, type 2 ([facility])    Acute respiratory failure with hypoxia ([facility])    KATRINA (obstructive sleep apnea)          Code Status: Level 1 - Full Code

## 2022-06-05 NOTE — ASSESSMENT & PLAN NOTE
Lab Results   Component Value Date    HGBA1C 6 8 (H) 03/09/2022     Recent Labs     06/04/22 1953 06/04/22 2121 06/05/22  0810   POCGLU 182* 206* 215*     Blood Sugar Average: Last 72 hrs:  (P) 201   Home Tujeo solostar 25 U HS     Plan:  · lantus 25units at night  · ISS and accuchecks   · Hypoglycemia protocol   · Diabetic diet

## 2022-06-05 NOTE — PLAN OF CARE
Problem: Potential for Falls  Goal: Patient will remain free of falls  Description: INTERVENTIONS:  - Educate patient/family on patient safety including physical limitations  - Instruct patient to call for assistance with activity   - Consult OT/PT to assist with strengthening/mobility   - Keep Call bell within reach  - Keep bed low and locked with side rails adjusted as appropriate  - Keep care items and personal belongings within reach  - Initiate and maintain comfort rounds  - Make Fall Risk Sign visible to staff  - Offer Toileting every 2 Hours, in advance of need  - Initiate/Maintain bed alarm  - Obtain necessary fall risk management equipment: bed alarm  - Apply yellow socks and bracelet for high fall risk patients  - Consider moving patient to room near nurses station  Outcome: Progressing     Problem: MOBILITY - ADULT  Goal: Maintain or return to baseline ADL function  Description: INTERVENTIONS:  -  Assess patient's ability to carry out ADLs; assess patient's baseline for ADL function and identify physical deficits which impact ability to perform ADLs (bathing, care of mouth/teeth, toileting, grooming, dressing, etc )  - Assess/evaluate cause of self-care deficits   - Assess range of motion  - Assess patient's mobility; develop plan if impaired  - Assess patient's need for assistive devices and provide as appropriate  - Encourage maximum independence but intervene and supervise when necessary  - Involve family in performance of ADLs  - Assess for home care needs following discharge   - Consider OT consult to assist with ADL evaluation and planning for discharge  - Provide patient education as appropriate  Outcome: Progressing  Goal: Maintains/Returns to pre admission functional level  Description: INTERVENTIONS:  - Perform BMAT or MOVE assessment daily    - Set and communicate daily mobility goal to care team and patient/family/caregiver     - Collaborate with rehabilitation services on mobility goals if consulted  - Perform Range of Motion 2 times a day  - Reposition patient every 2 hours    - Dangle patient 2 times a day  - Stand patient 2 times a day  - Ambulate patient 2 times a day  - Out of bed to chair 2 times a day   - Out of bed for meals 2 times a day  - Out of bed for toileting  - Record patient progress and toleration of activity level   Outcome: Progressing

## 2022-06-05 NOTE — PROGRESS NOTES
[facility]  Progress Note Peter Allan 1950, 73 y o  female MRN: [id number]  Unit/Bed#: S -01 Encounter: [id number]  Primary Care Provider: Monika Jimenez MD   Date and time admitted to hospital: 6/4/2022  2:34 PM    * Acute on chronic diastolic CHF (congestive heart failure) ([address] )  Assessment & Plan  Wt Readings from Last 3 Encounters:   06/05/22 114 kg (251 lb 3 1 oz)   05/31/22 116 kg (255 lb)   05/26/22 114 kg (252 lb)     · POA with increased weight gain, SOB and worsening LE edema   · Compliant with diuretics; no dietary indiscretion  Denies episodes of afib since her recent ED visit  · At recent ED visit, Torsemide inc from 20 mg to 60 mg daily  · Dry Wt = 244    Plan:  · Cont lasix 60mg BID   · I/Os, daily weights  · Low salt diet, FR 1800   · Cardiology consult    Acute respiratory failure with hypoxia (HCC)  Assessment & Plan  · Mildly hypoxic on admission; wears nocturnal O2 for KATRINA, 2L O2    · Currently, on 1 5L satting high 80s     Plan:  · Monitor resp status   · Wean with diuresis - maintain Sat >89%  · Albuterol PRN      KATRINA (obstructive sleep apnea)  Assessment & Plan  · On nocturnal O2   · Non compliant with BIPAP     Plan:   · outpt follow-up with sleep medicine     Diabetes mellitus, type 2 [facility])  Assessment & Plan  Lab Results   Component Value Date    HGBA1C 6 8 (H) 03/09/2022     Recent Labs     06/04/22 1953 06/04/22 2121 06/05/22  0810   POCGLU 182* 206* 215*     Blood Sugar Average: Last 72 hrs:  (P) 201   Home Tujeo solostar 25 U HS     Plan:  · lantus 25units at night  · ISS and accuchecks   · Hypoglycemia protocol   · Diabetic diet     Paroxysmal atrial fibrillation (HCC)  Assessment & Plan  Rate controlled     Plan:   · Cont metoprolol BID   · Cont apixaban     VTE Pharmacologic Prophylaxis: VTE Score: 7 High Risk (Score >/= 5) - Pharmacological DVT Prophylaxis Ordered: apixaban (Eliquis)   Sequential Compression Devices Ordered  Patient Centered Rounds: I performed bedside rounds with nursing staff today  Discussions with Specialists or Other Care Team Provider: Cardiology     Education and Discussions with Family / Patient: Updated  (sister) via phone  Current Length of Stay: 1 day(s)  Current Patient Status: Inpatient   Discharge Plan: Anticipate discharge in 24-48 hrs to home  Code Status: Level 1 - Full Code    Subjective:   Nursing team reported no overnight events  Patient reported improvement in her shortness of breath, has been walking from bed to bathroom without assistive devices  Patient reported lower extremity swelling, however, on exam lower extremities without edema  Patient reported mild headache and had a bowel movement last night of regular/normal consistency for her  Patient reports voiding urine appropriately  Objective:     Vitals:   Temp (24hrs), Av 9 °F (36 6 °C), Min:97 4 °F (36 3 °C), Max:98 6 °F (37 °C)    Temp:  [97 4 °F (36 3 °C)-98 6 °F (37 °C)] 97 6 °F (36 4 °C)  HR:  [60-68] 60  Resp:  [18] 18  BP: (114-163)/(47-72) 114/47  SpO2:  [98 %-100 %] 100 %  Body mass index is 45 94 kg/m²  Input and Output Summary (last 24 hours): Intake/Output Summary (Last 24 hours) at 2022 1544  Last data filed at 2022 1501  Gross per 24 hour   Intake 420 ml   Output 1700 ml   Net -1280 ml       Physical Exam:   Physical Exam  Vitals and nursing note reviewed  Constitutional:       General: She is not in acute distress  Appearance: Normal appearance  She is morbidly obese  She is not ill-appearing or diaphoretic  HENT:      Head: Normocephalic and atraumatic  Mouth/Throat:      Mouth: Mucous membranes are moist       Pharynx: Oropharynx is clear  Eyes:      General: No scleral icterus  Conjunctiva/sclera: Conjunctivae normal    Neck:      Vascular: JVD present  Cardiovascular:      Rate and Rhythm: Normal rate and regular rhythm        Pulses: Normal pulses  Heart sounds: Normal heart sounds  No murmur heard  No gallop  Pulmonary:      Effort: Pulmonary effort is normal  No respiratory distress  Breath sounds: Examination of the right-lower field reveals rales  Examination of the left-lower field reveals rales  Rales present  No wheezing  Comments: On 1 5 L   Abdominal:      General: Bowel sounds are normal  There is no distension  Palpations: Abdomen is soft  There is no mass  Tenderness: There is no abdominal tenderness  Musculoskeletal:         General: Normal range of motion  Cervical back: Normal range of motion and neck supple  Right lower leg: No edema  Left lower leg: No edema  Skin:     General: Skin is warm and dry  Capillary Refill: Capillary refill takes less than 2 seconds  Coloration: Skin is not jaundiced  Neurological:      General: No focal deficit present  Mental Status: She is alert and oriented to person, place, and time  Mental status is at baseline  Sensory: No sensory deficit     Psychiatric:         Mood and Affect: Mood normal          Behavior: Behavior normal           Additional Data:     Labs:  Results from last 7 days   Lab Units 06/04/22  1547   WBC Thousand/uL 8 08   HEMOGLOBIN g/dL 11 0*   HEMATOCRIT % 35 8   PLATELETS Thousands/uL 181   NEUTROS PCT % 58   LYMPHS PCT % 25   MONOS PCT % 10   EOS PCT % 6     Results from last 7 days   Lab Units 06/05/22  0345 06/04/22  1547   SODIUM mmol/L 141 139   POTASSIUM mmol/L 4 3 4 5   CHLORIDE mmol/L 100 98   CO2 mmol/L 36* 32   BUN mg/dL 68* 69*   CREATININE mg/dL 1 64* 1 62*   ANION GAP mmol/L 5 9   CALCIUM mg/dL 8 6 8 8   ALBUMIN g/dL  --  3 8   TOTAL BILIRUBIN mg/dL  --  0 37   ALK PHOS U/L  --  61   ALT U/L  --  12   AST U/L  --  15   GLUCOSE RANDOM mg/dL 224* 196*     Results from last 7 days   Lab Units 06/04/22  1547   INR  1 25*     Results from last 7 days   Lab Units 06/05/22  1507 06/05/22  1109 06/05/22  0810 06/04/22 2121 06/04/22 1953   POC GLUCOSE mg/dl 222* 269* 215* 206* 182*               Lines/Drains:  Invasive Devices  Report    Peripheral Intravenous Line  Duration           Peripheral IV 05/31/22 Right Antecubital 5 days    Peripheral IV 06/04/22 Proximal;Right;Ventral (anterior) Antecubital <1 day                Imaging: Personally reviewed the following imaging: chest xray    Recent Cultures (last 7 days):         Last 24 Hours Medication List:   Current Facility-Administered Medications   Medication Dose Route Frequency Provider Last Rate    albuterol  2 puff Inhalation Q6H PRN Carolina Jacob MD      apixaban  5 mg Oral BID Carolina Jacob MD      furosemide  60 mg Intravenous BID (diuretic) Carolina Jacob MD      insulin glargine  25 Units Subcutaneous HS Carolina Jacob MD      insulin lispro  1-6 Units Subcutaneous TID [facility] Carolina Jacob MD      insulin lispro  1-6 Units Subcutaneous HS Carolina Jacob MD      metoprolol tartrate  50 mg Oral Q12H [facility] Carolina Jacob MD      montelukast  10 mg Oral Daily Carolina Jacob MD      pravastatin  80 mg Oral Daily With Alondra Green MD      pregabalin  100 mg Oral BID Carolina Jacob MD          Today, Patient Was Seen By: Pradip Aguilar MD    **Please Note: This note may have been constructed using a voice recognition system  **

## 2022-06-06 LAB
ANION GAP SERPL CALCULATED.3IONS-SCNC: 7 MMOL/L (ref 4–13)
BUN SERPL-MCNC: 66 MG/DL (ref 5–25)
CALCIUM SERPL-MCNC: 8.4 MG/DL (ref 8.4–10.2)
CHLORIDE SERPL-SCNC: 97 MMOL/L (ref 96–108)
CO2 SERPL-SCNC: 36 MMOL/L (ref 21–32)
CREAT SERPL-MCNC: 1.46 MG/DL (ref 0.6–1.3)
GFR SERPL CREATININE-BSD FRML MDRD: 36 ML/MIN/1.73SQ M
GLUCOSE SERPL-MCNC: 151 MG/DL (ref 65–140)
GLUCOSE SERPL-MCNC: 171 MG/DL (ref 65–140)
GLUCOSE SERPL-MCNC: 206 MG/DL (ref 65–140)
GLUCOSE SERPL-MCNC: 231 MG/DL (ref 65–140)
GLUCOSE SERPL-MCNC: 300 MG/DL (ref 65–140)
POTASSIUM SERPL-SCNC: 4.2 MMOL/L (ref 3.5–5.3)
SODIUM SERPL-SCNC: 140 MMOL/L (ref 135–147)

## 2022-06-06 PROCEDURE — 99232 SBSQ HOSP IP/OBS MODERATE 35: CPT | Performed by: INTERNAL MEDICINE

## 2022-06-06 PROCEDURE — 80048 BASIC METABOLIC PNL TOTAL CA: CPT

## 2022-06-06 PROCEDURE — 82948 REAGENT STRIP/BLOOD GLUCOSE: CPT

## 2022-06-06 RX ORDER — INSULIN LISPRO 100 [IU]/ML
4 INJECTION, SOLUTION INTRAVENOUS; SUBCUTANEOUS
Status: DISCONTINUED | OUTPATIENT
Start: 2022-06-06 | End: 2022-06-11

## 2022-06-06 RX ORDER — METOLAZONE 5 MG/1
2.5 TABLET ORAL ONCE
Status: COMPLETED | OUTPATIENT
Start: 2022-06-06 | End: 2022-06-06

## 2022-06-06 RX ADMIN — PREGABALIN 100 MG: 100 CAPSULE ORAL at 08:06

## 2022-06-06 RX ADMIN — METOPROLOL TARTRATE 50 MG: 50 TABLET, FILM COATED ORAL at 21:28

## 2022-06-06 RX ADMIN — PRAVASTATIN SODIUM 80 MG: 80 TABLET ORAL at 17:42

## 2022-06-06 RX ADMIN — APIXABAN 5 MG: 5 TABLET, FILM COATED ORAL at 17:42

## 2022-06-06 RX ADMIN — INSULIN LISPRO 4 UNITS: 100 INJECTION, SOLUTION INTRAVENOUS; SUBCUTANEOUS at 17:43

## 2022-06-06 RX ADMIN — INSULIN LISPRO 2 UNITS: 100 INJECTION, SOLUTION INTRAVENOUS; SUBCUTANEOUS at 21:29

## 2022-06-06 RX ADMIN — INSULIN GLARGINE 25 UNITS: 100 INJECTION, SOLUTION SUBCUTANEOUS at 21:29

## 2022-06-06 RX ADMIN — PREGABALIN 100 MG: 100 CAPSULE ORAL at 17:42

## 2022-06-06 RX ADMIN — METOPROLOL TARTRATE 50 MG: 50 TABLET, FILM COATED ORAL at 08:06

## 2022-06-06 RX ADMIN — FUROSEMIDE 60 MG: 10 INJECTION, SOLUTION INTRAMUSCULAR; INTRAVENOUS at 17:42

## 2022-06-06 RX ADMIN — INSULIN LISPRO 4 UNITS: 100 INJECTION, SOLUTION INTRAVENOUS; SUBCUTANEOUS at 11:53

## 2022-06-06 RX ADMIN — METOLAZONE 2.5 MG: 5 TABLET ORAL at 15:39

## 2022-06-06 RX ADMIN — APIXABAN 5 MG: 5 TABLET, FILM COATED ORAL at 08:06

## 2022-06-06 RX ADMIN — INSULIN LISPRO 3 UNITS: 100 INJECTION, SOLUTION INTRAVENOUS; SUBCUTANEOUS at 17:43

## 2022-06-06 RX ADMIN — FUROSEMIDE 60 MG: 10 INJECTION, SOLUTION INTRAMUSCULAR; INTRAVENOUS at 08:05

## 2022-06-06 RX ADMIN — MONTELUKAST 10 MG: 10 TABLET, FILM COATED ORAL at 08:06

## 2022-06-06 RX ADMIN — INSULIN LISPRO 1 UNITS: 100 INJECTION, SOLUTION INTRAVENOUS; SUBCUTANEOUS at 08:06

## 2022-06-06 NOTE — CASE MANAGEMENT
Case Management Assessment & Discharge Planning Note    Patient name Cristofer Pate  Location S /S -93 MRN [id number]  : 1950 Date 2022       Current Admission Date: 2022  Current Admission Diagnosis:Acute on chronic diastolic CHF (congestive heart failure) [facility])   Patient Active Problem List    Diagnosis Date Noted    KATRINA (obstructive sleep apnea) 2022    Obesity hypoventilation syndrome ([address] ) 2022    Acute on chronic respiratory failure with hypoxia and hypercapnia ([address] ) 2022    Morbid obesity ([address] ) 2022    Pure hypercholesterolemia 2022    Lump of left breast 2022    Pressure injury of deep tissue of sacral region 2022    Pulmonary nodules 2022    Acute on chronic diastolic CHF (congestive heart failure) ([address] ) 2022    Atrial fibrillation with rapid ventricular response ([address] ) 2022    Peripheral venous insufficiency 2022    Post-herpetic polyneuropathy 2022    Raynaud's disease 2022    Lung nodule < 6cm on CT 2022    Acute kidney injury on CKD stage 3     Acute respiratory failure with hypoxia ([address] ) 2022    Status post reverse total replacement of left shoulder 2022    Closed 4-part fracture of proximal humerus, left, initial encounter 2022    PONV (postoperative nausea and vomiting) 2022    SEBASTIAN (acute kidney injury) ([address] ) 2022    Diabetes mellitus, type 2 ([address] ) 2022    Gastroesophageal reflux disease 2022    Nephrolithiasis 2022    Arthritis 2022    S/P total knee replacement, right 2022    On continuous oral anticoagulation - eliquis 2022    Humeral head fracture 2022    Essential hypertension 2019    Anemia 2019    Mixed hyperlipidemia 2019    Paroxysmal atrial fibrillation (HCC) 2019    Lower leg edema 2019    Asthma 2018    Morbid obesity with BMI of 45 0-49 9, adult ([facility] ) 03/08/2018      LOS (days): 2  Geometric Mean LOS (GMLOS) (days): 3 80  Days to GMLOS:2     OBJECTIVE:  PATIENT READMITTED TO HOSPITAL  Risk of Unplanned Readmission Score: 45 07         Current admission status: Inpatient       Preferred Pharmacy:   [address] ([address], [address] [facility] [place] ([address] [place] [address])  [address] [facility] [address] ([address])  [place] [address]  Phone: [phone] Fax: [phone]    Primary Care Provider: Trevor Vásquez MD    Primary Insurance: MEDICARE  Secondary Insurance: [employer]    ASSESSMENT:  Active Health Care Proxies     [facility] Representative - Sister   Primary Phone: [phone] (Mobile)               Patient Information  Admitted from[de-identified] Home  Mental Status: Alert  During Assessment patient was accompanied by: Not accompanied during assessment  Assessment information provided by[de-identified] Patient  Primary Caregiver: Self  Support Systems: Family members  [place] of Residence: [address] [place] [address] do you live in?: [address] [place] [address] Road entry access options  Select all that apply : Stairs  Number of steps to enter home : 3  Do the steps have railings?: Yes  Type of Current Residence: [place]  In the last 12 months, was there a time when you were not able to pay the mortgage or rent on time?: No  In the last 12 months, how many places have you lived?: 2  In the last 12 months, was there a time when you did not have a steady place to sleep or slept in a shelter (including now)?: No  Homeless/housing insecurity resource given?: N/A  Living Arrangements: Lives w/ Extended Family (Niece- Blu Zamorano)    Activities of Daily Living Prior to Admission  Functional Status: Independent  Completes ADLs independently?: Yes  Ambulates independently?: Yes  Does patient use assisted devices?: Yes  Assisted Devices (DME) used: Home Oxygen concentrator, Portable Oxygen tanks  DME Company Name (respiratory supplies):  [employer]  O2 Rate(s): 2L  Does patient currently own DME?: Yes  What DME does the patient currently own?: Home Oxygen concentrator, Portable Oxygen concentrator, Walker  Does patient have a history of Outpatient Therapy (PT/OT)?: No  Does the patient have a history of Short-Term Rehab?: Yes ([facility])  Does patient have a history of HHC?: Yes (Open to [facility])  Does patient currently have [address]?: Yes    Current Home Health Care  Type of Current Home Care Services: Nurse visit  [address][de-identified] Edin [address] Provider[de-identified] PCP    Patient Information Continued  Within the past 12 months, you worried that your food would run out before you got the money to buy more : Never true  Within the past 12 months, the food you bought just didn't last and you didn't have money to get more : Never true  Food insecurity resource given?: N/A  Does patient receive dialysis treatments?: No  Does patient have a history of substance abuse?: No  Does patient have a history of Mental Health Diagnosis?: No    Means of Transportation  Means of Transport to Appts[de-identified] Family transport  In the past 12 months, has lack of transportation kept you from medical appointments or from getting medications?: No  In the past 12 months, has lack of transportation kept you from meetings, work, or from getting things needed for daily living?: No  Was application for public transport provided?: N/A    DISCHARGE DETAILS:    Discharge planning discussed with[de-identified] Patient  Freedom of Choice: Yes  Comments - Freedom of Choice: FOC discussed regarding [facility] with [facility]  Referral placed in [address] per request of pt     Contacts  Patient Contacts: Patient  Contact Method:  In Person  Reason/Outcome: Continuity of Care, Referral, Discharge [address]         Is the patient interested in [address] at discharge?: Yes  [address] requested[de-identified] [address] Name[de-identified] Hoang Burks Provider[de-identified] PCP  Home Health Services Needed[de-identified] Evaluate Functional Status and Safety, Heart Failure Management, Oxygen Via Nasal Cannula  Homebound Criteria Met[de-identified] Requires the Assistance of Another Person for Safe Ambulation or to Leave the Home  Supporting Clincal Findings[de-identified] Requires Oxygen, Limited Endurance, Fatigues Easliy in [employer]    Other Referral/Resources/Interventions Provided:  Interventions: [facility]  Referral Comments: referral placed in [facility] to [facility]    Per pt, home PT recently ended, referral only for SN    Treatment Team Recommendation: Home with [facility]  Discharge Destination Plan[de-identified] Home with [facility]   IMM Given (Date):: 06/04/22

## 2022-06-06 NOTE — PROGRESS NOTES
Patient ambulating on room air pulse ox dropped to 70's and became dizzy  Patient placed on 2 liters and now resting comfortably in chair on 2 liters nasal cannula pulse ox 95%  SLIM made aware

## 2022-06-06 NOTE — ASSESSMENT & PLAN NOTE
· Mildly hypoxic on admission; wears nocturnal O2 for KATRINA, 2L O2  · Was previously on 1 5L satting high 80s  · Able to saturate appropriately on 06/06 a m  Without oxygen  Etiology:  Likely in setting of acute exacerbation of heart failure      Plan:  · Monitor resp status   · Wean with diuresis - maintain Sat >89%  · Albuterol PRN

## 2022-06-06 NOTE — ASSESSMENT & PLAN NOTE
Rate controlled  Regular rhythm noted on exam     Plan:   · Cont metoprolol BID   · Cont apixaban 5 mg b i d

## 2022-06-06 NOTE — PROGRESS NOTES
[facility]  Progress Note Beatris Ramirez 1950, 73 y o  female MRN: [id number]  Unit/Bed#: S -01 Encounter: [id number]  Primary Care Provider: Roni Thomas MD   Date and time admitted to hospital: 6/4/2022  2:34 PM    * Acute on chronic diastolic CHF (congestive heart failure) [facility])  Assessment & Plan  Wt Readings from Last 3 Encounters:   06/05/22 114 kg (251 lb 3 1 oz)   05/31/22 116 kg (255 lb)   05/26/22 114 kg (252 lb)     · POA with increased weight gain, SOB and worsening LE edema   · NT pro BNP 5/31: 1859, BNP 6/4-74  · EKG in ED:  Rate 70s, NSR  · Compliant with diuretics; no dietary indiscretion  Denies episodes of afib since her recent ED visit  · At recent ED visit, Torsemide inc from 20 mg to 60 mg daily  · Dry Wt = 244  · CXR:  No significant vascular congestion noted on my read  · Echo 3/22: LVEF 53%    Plan:  · Cont lasix 60mg BID   · 2 5 mg of  Metolazone prior to next lasix dose as per Cards recs  · I/Os, daily weights  · Oxygen as needed to maintain saturations above 89%; does not require oxygen on 06/06 a m  During exam   · Low salt diet, FR 1800   · Cardiology consult    Acute respiratory failure with hypoxia ([facility] )  Assessment & Plan  · Mildly hypoxic on admission; wears nocturnal O2 for KATRINA, 2L O2  · Was previously on 1 5L satting high 80s  · Able to saturate appropriately on 06/06 a m  Without oxygen  · Resolved on 6/6 AM  · Etiology:  Likely in setting of acute exacerbation of heart failure  Plan:  · Monitor resp status off of oxygen  · Wean with diuresis - maintain Sat >89%  · Albuterol PRN      Paroxysmal atrial fibrillation (HCC)  Assessment & Plan  Rate controlled  Regular rhythm noted on exam     Plan:   · Cont metoprolol BID   · Cont apixaban 5 mg b i d      Diabetes mellitus, type 2 [facility])  Assessment & Plan  Lab Results   Component Value Date    HGBA1C 6 8 (H) 03/09/2022     Recent Labs     06/05/22  1109 06/05/22  1507 22  0725   POCGLU 269* 222* 288* 171*     Blood Sugar Average: Last 72 hrs:  (P) 221 [id number]   Home Tujeo solostar 25 U HS     Plan:  · lantus 25units at night  · Added 4units at mealtime on    · ISS and accuchecks   · Hypoglycemia protocol   · Diabetic diet   · Upon discharge, follow-up with PCP for better diabetic control    KATRINA (obstructive sleep apnea)  Assessment & Plan  · On nocturnal O2   · Non compliant with BIPAP     Plan:   · outpt follow-up with sleep medicine       VTE Pharmacologic Prophylaxis: VTE Score: 7 High Risk (Score >/= 5) - Pharmacological DVT Prophylaxis Ordered: apixaban (Eliquis)  Sequential Compression Devices Ordered  Patient Centered Rounds: I performed bedside rounds with nursing staff today  Discussions with Specialists or Other Care Team Provider:  Cardiology    Education and Discussions with Family / Patient: Will update family members later today  Current Length of Stay: 2 day(s)  Current Patient Status: Inpatient   Discharge Plan: Anticipate discharge in 48 hrs to home  Code Status: Level 1 - Full Code    Subjective:   Patient seen and examined at bedside  No significant events overnight  Denies chest pain, pnd, orthopnea, worsening SOB, abdominal pain, nausea vomiting, fever, chills, headache, dizziness or palpitations  She tells me she is able to pass urine without any problems and feels less swollen "    Objective:     Vitals:   Temp (24hrs), Av 8 °F (36 6 °C), Min:97 6 °F (36 4 °C), Max:98 1 °F (36 7 °C)    Temp:  [97 6 °F (36 4 °C)-98 1 °F (36 7 °C)] 98 1 °F (36 7 °C)  HR:  [60-85] 85  Resp:  [18] 18  BP: (114-132)/(47-63) 132/63  SpO2:  [98 %-100 %] 98 %  Body mass index is 45 43 kg/m²  Input and Output Summary (last 24 hours):      Intake/Output Summary (Last 24 hours) at 2022 1222  Last data filed at 2022 0743  Gross per 24 hour   Intake 440 ml   Output 2100 ml   Net -1660 ml     Physical Exam   Vitals reviewed  General Examination: Sitting in chair, cooperative   HEENT: Normocephalic, Atraumatic  Extraocular movements intact, PERRLA  CVS: S1, S2 noted  Regular rhythm  No JVD noted  Lungs: Decreased breath sounds b/l  Abdomen: Soft, normal bowel sounds  Non distended, non tender  Ext: No edema noted  Psych: Though Process - logical    Skin: No bleeding/bruising noted  Neuro: A, Ox3  Follows simple, 3 steps commands  Appropriate antigravity strength in all 4 extremities proximally, distally  Additional Data:     Labs:  Results from last 7 days   Lab Units 06/04/22  1547   WBC Thousand/uL 8 08   HEMOGLOBIN g/dL 11 0*   HEMATOCRIT % 35 8   PLATELETS Thousands/uL 181   NEUTROS PCT % 58   LYMPHS PCT % 25   MONOS PCT % 10   EOS PCT % 6     Results from last 7 days   Lab Units 06/06/22  0442 06/05/22  0345 06/04/22  1547   SODIUM mmol/L 140   < > 139   POTASSIUM mmol/L 4 2   < > 4 5   CHLORIDE mmol/L 97   < > 98   CO2 mmol/L 36*   < > 32   BUN mg/dL 66*   < > 69*   CREATININE mg/dL 1 46*   < > 1 62*   ANION GAP mmol/L 7   < > 9   CALCIUM mg/dL 8 4   < > 8 8   ALBUMIN g/dL  --   --  3 8   TOTAL BILIRUBIN mg/dL  --   --  0 37   ALK PHOS U/L  --   --  61   ALT U/L  --   --  12   AST U/L  --   --  15   GLUCOSE RANDOM mg/dL 151*   < > 196*    < > = values in this interval not displayed       Results from last 7 days   Lab Units 06/04/22  1547   INR  1 25*     Results from last 7 days   Lab Units 06/06/22  1046 06/06/22  0725 06/05/22  2123 06/05/22  1507 06/05/22  1109 06/05/22  0810 06/04/22  2121 06/04/22  1953   POC GLUCOSE mg/dl 300* 171* 288* 222* 269* 215* 206* 182*               Lines/Drains:  Invasive Devices  Report    Peripheral Intravenous Line  Duration           Peripheral IV 05/31/22 Right Antecubital 5 days    Peripheral IV 06/04/22 Proximal;Right;Ventral (anterior) Antecubital 1 day                Imaging:  Reviewed pertinent reports from this admission including:  XR chest 2 views   ED Interpretation by Brooke Brumfield PA-C (06/04 0571)   Mild signs of vascular congestion        Final Result by Jana Brooks MD (06/05 4358)      No acute cardiopulmonary disease  Workstation performed: [id number]               Recent Cultures (last 7 days):         Last 24 Hours Medication List:   Current Facility-Administered Medications   Medication Dose Route Frequency Provider Last Rate    acetaminophen  975 mg Oral Q8H PRN Juan Thomas MD      albuterol  2 puff Inhalation Q6H PRN Isra Camejo MD      apixaban  5 mg Oral BID Isra Camejo MD      furosemide  60 mg Intravenous BID (diuretic) Isra Camejo MD      insulin glargine  25 Units Subcutaneous HS Isra Camejo MD      insulin lispro  1-6 Units Subcutaneous TID [facility] Isra Camejo MD      insulin lispro  1-6 Units Subcutaneous HS MD Yeison Beltre insulin lispro  4 Units Subcutaneous TID With Meals Mj Sheldon MD      metolazone  2 5 mg Oral Once RK Lynch      metoprolol tartrate  50 mg Oral Q12H Gilda Iniguez MD      montelukast  10 mg Oral Daily Irsa Camejo MD      pravastatin  80 mg Oral Daily With Cheryl Ochoa MD      pregabalin  100 mg Oral BID Isra Camejo MD          Today, Patient Was Seen By: Mj Sheldon MD    **Please Note: This note may have been constructed using a voice recognition system  **

## 2022-06-06 NOTE — ASSESSMENT & PLAN NOTE
· Rate controlled  Regular rhythm noted on exam     Plan:   · Cont metoprolol BID   · Cont apixaban 5 mg b i d

## 2022-06-06 NOTE — QUICK NOTE
I spoke to this patient's sister, Ms Juan C Butler, over the phone to provide a comprehensive clinical update  I answered all of her questions and addressed all of her concerns to the best of my ability and to her satisfaction  Ms Juan C Butler tells me that the patient will not be going to rehab were nursing home after discharge  Patient is staying with her sister's daughters, and she would be returning there upon discharge  I also informed Ms Whitfield that we have the cardiology team involved for further management of heart failure and to investigate the etiology, although Ms Juan C Butler understands that this is a chronic problem for the patient especially in setting of her multiple medical conditions

## 2022-06-06 NOTE — ASSESSMENT & PLAN NOTE
· On nocturnal O2   · Non compliant with BIPAP     Plan:   · outpt follow-up with sleep medicine upon discharge

## 2022-06-06 NOTE — PROGRESS NOTES
Progress Note - Cardiology Team 1  Grayson Celestin 73 y o  female MRN: [id number]  Unit/Bed#: S -01 Encounter: [id number]        Principal Problem:    Acute on chronic diastolic CHF (congestive heart failure) (HCC)  Active Problems:    Paroxysmal atrial fibrillation (HCC)    Diabetes mellitus, type 2 (HCC)    Acute respiratory failure with hypoxia (HCC)    KATRINA (obstructive sleep apnea)      Assessment/Plan    1  Acute on chronic diastolic heart failure  IV Lasix 60 mg b i d  Documented net -1660 24 hours  Pt reports " I am not peeing like I should be "  Reports improved LE edema/SOB ( hasn't really been OOB)   Still feels "swollen"  Reported dry weight 244 lb  Last d/c weight 247 lbs  No weight today- will check   Will check pulse ox on r/a  Add 2 5 mg of  Metolazone prior to next lasix dose   BNP 74  ProBNP 5/31/2022- 1859  TTE 3/2022- LVEF 53%  CHF [facility]    Hospital 5/16 to 5/23- AFib with RVR and acute on chronic diastolic heart failure  Discharge weight 247 lb  Lopressor 50 mg every 12 hours  Torsemide 20 mg daily  ED visit 05/31-torsemide increased to 60 mg  Reports 10 lb weight gain in 2 weeks    2  PAF- not currently on telemetry  HR documented controlled  Presented in sinus rhythm  AC-Eliquis 5 mg b i d  Lopressor 50 mg every 12 hours  Encouraged to be compliant with CPAP  Hospital 5/16-5/23- AFib with RVR and acute on chronic diastolic heart failure    3  Hypertension-controlled  Currently on Lopressor 50 mg twice a day    4  HLD-pravastatin 80 mg daily    5  CKD- creatinine 1 46 from 1 6 on presentation  Monitor with diuresis    6  Obstructive sleep apnea-trying to adjust to CPAP  Suspected to be triggering her atrial fibrillation      Subjective/Objective   Chief Complaint/Subjective  Patient without SOB at this time  Has really not been out of bed  No chest pain or pressure  Reports minimal diuresis          Vitals: /63 (BP Location: Left arm)   Pulse 85   Temp 98 1 °F (36 7 °C) (Oral)   Resp 18   Ht 5' 2" (1 575 m)   Wt 114 kg (251 lb 3 1 oz)   SpO2 98%   BMI 45 94 kg/m²     Vitals:    06/04/22 1946 06/05/22 0600   Weight: 114 kg (251 lb 3 2 oz) 114 kg (251 lb 3 1 oz)     Orthostatic Blood Pressures    Flowsheet Row Most Recent Value   Blood Pressure 132/63 filed at 06/06/2022 5564   Patient Position - Orthostatic VS Lying filed at 06/06/2022 0723            Intake/Output Summary (Last 24 hours) at 6/6/2022 0932  Last data filed at 6/6/2022 0743  Gross per 24 hour   Intake 440 ml   Output 2100 ml   Net -1660 ml       Invasive Devices  Report    Peripheral Intravenous Line  Duration           Peripheral IV 05/31/22 Right Antecubital 5 days    Peripheral IV 06/04/22 Proximal;Right;Ventral (anterior) Antecubital 1 day                Current Facility-Administered Medications   Medication Dose Route Frequency    acetaminophen (TYLENOL) tablet 975 mg  975 mg Oral Q8H PRN    albuterol (PROVENTIL HFA,VENTOLIN HFA) inhaler 2 puff  2 puff Inhalation Q6H PRN    apixaban (ELIQUIS) tablet 5 mg  5 mg Oral BID    furosemide (LASIX) injection 60 mg  60 mg Intravenous BID (diuretic)    insulin glargine (LANTUS) subcutaneous injection 25 Units 0 25 mL  25 Units Subcutaneous HS    insulin lispro (HumaLOG) 100 units/mL subcutaneous injection 1-6 Units  1-6 Units Subcutaneous TID AC    insulin lispro (HumaLOG) 100 units/mL subcutaneous injection 1-6 Units  1-6 Units Subcutaneous HS    metoprolol tartrate (LOPRESSOR) tablet 50 mg  50 mg Oral Q12H SELMA    montelukast (SINGULAIR) tablet 10 mg  10 mg Oral Daily    pravastatin (PRAVACHOL) tablet 80 mg  80 mg Oral Daily With Dinner    pregabalin (LYRICA) capsule 100 mg  100 mg Oral BID         Physical Exam: /63 (BP Location: Left arm)   Pulse 85   Temp 98 1 °F (36 7 °C) (Oral)   Resp 18   Ht 5' 2" (1 575 m)   Wt 114 kg (251 lb 3 1 oz)   SpO2 98%   BMI 45 94 kg/m²     General Appearance:    Alert, cooperative, no distress, appears stated age   Head:    Normocephalic, no scleral icterus   Eyes:    PERRL   Nose:   Nares normal, septum midline, no drainage    Throat:   Lips, mucosa, and tongue normal   Neck:   Supple, symmetrical, trachea midline,              Lungs:     Decreased/ few crackles  to auscultation bilaterally, respirations unlabored        Heart:    Irregular rate and rhythm, S1 and S2 normal, no murmur, rub   or gallop   Abdomen:     Soft, non-tender, bowel sounds active all four quadrants,     no masses, no organomegaly   Extremities:   Extremities normal, atraumatic, no cyanosis , mild edema       Skin:   Skin warm   Neurologic:   Alert and oriented to person place and time, no focal deficits                 Lab Results:   Recent Results (from the past 72 hour(s))   ECG 12 lead    Collection Time: 06/04/22  2:37 PM   Result Value Ref Range    Ventricular Rate 75 BPM    Atrial Rate 78 BPM    CA Interval 192 ms    QRSD Interval 70 ms    QT Interval 362 ms    QTC Interval 405 ms    P Axis 63 degrees    QRS Axis -36 degrees    T Wave Axis 33 degrees   CBC and differential    Collection Time: 06/04/22  3:47 PM   Result Value Ref Range    WBC 8 08 4 31 - 10 16 Thousand/uL    RBC 3 90 3 81 - 5 12 Million/uL    Hemoglobin 11 0 (L) 11 5 - 15 4 g/dL    Hematocrit 35 8 34 8 - 46 1 %    MCV 92 82 - 98 fL    MCH 28 2 26 8 - 34 3 pg    MCHC 30 7 (L) 31 4 - 37 4 g/dL    RDW 14 5 11 6 - 15 1 %    MPV 11 2 8 9 - 12 7 fL    Platelets 429 723 - 765 Thousands/uL    nRBC 0 /100 WBCs    Neutrophils Relative 58 43 - 75 %    Immat GRANS % 0 0 - 2 %    Lymphocytes Relative 25 14 - 44 %    Monocytes Relative 10 4 - 12 %    Eosinophils Relative 6 0 - 6 %    Basophils Relative 1 0 - 1 %    Neutrophils Absolute 4 66 1 85 - 7 62 Thousands/µL    Immature Grans Absolute 0 03 0 00 - 0 20 Thousand/uL    Lymphocytes Absolute 2 05 0 60 - 4 47 Thousands/µL    Monocytes Absolute 0 77 0 17 - 1 22 Thousand/µL    Eosinophils Absolute 0 50 0 00 - 0 61 Thousand/µL Basophils Absolute 0 07 0 00 - 0 10 Thousands/µL   Protime-INR    Collection Time: 06/04/22  3:47 PM   Result Value Ref Range    Protime 15 7 (H) 11 6 - 14 5 seconds    INR 1 25 (H) 0 84 - 1 19   APTT    Collection Time: 06/04/22  3:47 PM   Result Value Ref Range    PTT 31 23 - 37 seconds   Comprehensive metabolic panel    Collection Time: 06/04/22  3:47 PM   Result Value Ref Range    Sodium 139 135 - 147 mmol/L    Potassium 4 5 3 5 - 5 3 mmol/L    Chloride 98 96 - 108 mmol/L    CO2 32 21 - 32 mmol/L    ANION GAP 9 4 - 13 mmol/L    BUN 69 (H) 5 - 25 mg/dL    Creatinine 1 62 (H) 0 60 - 1 30 mg/dL    Glucose 196 (H) 65 - 140 mg/dL    Calcium 8 8 8 4 - 10 2 mg/dL    AST 15 13 - 39 U/L    ALT 12 7 - 52 U/L    Alkaline Phosphatase 61 34 - 104 U/L    Total Protein 7 6 6 4 - 8 4 g/dL    Albumin 3 8 3 5 - 5 0 g/dL    Total Bilirubin 0 37 0 20 - 1 00 mg/dL    eGFR 32 ml/min/1 73sq m   B-Type Natriuretic Peptide(BNP) AN, CA, EA Campuses Only    Collection Time: 06/04/22  3:47 PM   Result Value Ref Range    BNP 74 0 - 100 pg/mL   HS Troponin 0hr (reflex protocol)    Collection Time: 06/04/22  3:47 PM   Result Value Ref Range    hs TnI 0hr 6 "Refer to ACS Flowchart"- see link ng/L   HS Troponin I 2hr    Collection Time: 06/04/22  5:49 PM   Result Value Ref Range    hs TnI 2hr 6 "Refer to ACS Flowchart"- see link ng/L    Delta 2hr hsTnI 0 <20 ng/L   Fingerstick Glucose (POCT)    Collection Time: 06/04/22  7:53 PM   Result Value Ref Range    POC Glucose 182 (H) 65 - 140 mg/dl   HS Troponin I 4hr    Collection Time: 06/04/22  8:05 PM   Result Value Ref Range    hs TnI 4hr 7 "Refer to ACS Flowchart"- see link ng/L    Delta 4hr hsTnI 1 <20 ng/L   Fingerstick Glucose (POCT)    Collection Time: 06/04/22  9:21 PM   Result Value Ref Range    POC Glucose 206 (H) 65 - 140 mg/dl   Basic metabolic panel    Collection Time: 06/05/22  3:45 AM   Result Value Ref Range    Sodium 141 135 - 147 mmol/L    Potassium 4 3 3 5 - 5 3 mmol/L    Chloride 100 96 - 108 mmol/L    CO2 36 (H) 21 - 32 mmol/L    ANION GAP 5 4 - 13 mmol/L    BUN 68 (H) 5 - 25 mg/dL    Creatinine 1 64 (H) 0 60 - 1 30 mg/dL    Glucose 224 (H) 65 - 140 mg/dL    Calcium 8 6 8 4 - 10 2 mg/dL    eGFR 31 ml/min/1 73sq m   Magnesium    Collection Time: 06/05/22  3:45 AM   Result Value Ref Range    Magnesium 2 0 1 9 - 2 7 mg/dL   Fingerstick Glucose (POCT)    Collection Time: 06/05/22  8:10 AM   Result Value Ref Range    POC Glucose 215 (H) 65 - 140 mg/dl   Fingerstick Glucose (POCT)    Collection Time: 06/05/22 11:09 AM   Result Value Ref Range    POC Glucose 269 (H) 65 - 140 mg/dl   Fingerstick Glucose (POCT)    Collection Time: 06/05/22  3:07 PM   Result Value Ref Range    POC Glucose 222 (H) 65 - 140 mg/dl   Fingerstick Glucose (POCT)    Collection Time: 06/05/22  9:23 PM   Result Value Ref Range    POC Glucose 288 (H) 65 - 140 mg/dl   Basic metabolic panel    Collection Time: 06/06/22  4:42 AM   Result Value Ref Range    Sodium 140 135 - 147 mmol/L    Potassium 4 2 3 5 - 5 3 mmol/L    Chloride 97 96 - 108 mmol/L    CO2 36 (H) 21 - 32 mmol/L    ANION GAP 7 4 - 13 mmol/L    BUN 66 (H) 5 - 25 mg/dL    Creatinine 1 46 (H) 0 60 - 1 30 mg/dL    Glucose 151 (H) 65 - 140 mg/dL    Calcium 8 4 8 4 - 10 2 mg/dL    eGFR 36 ml/min/1 73sq m   Fingerstick Glucose (POCT)    Collection Time: 06/06/22  7:25 AM   Result Value Ref Range    POC Glucose 171 (H) 65 - 140 mg/dl     Imaging: I have personally reviewed pertinent reports  VTE Pharmacologic Prophylaxis: eliquis      Counseling / Coordination of Care  Total time spent today 20 minutes  Greater than 50% of total time was spent with the patient and / or family counseling and / or coordination of care

## 2022-06-06 NOTE — ASSESSMENT & PLAN NOTE
Wt Readings from Last 3 Encounters:   06/05/22 114 kg (251 lb 3 1 oz)   05/31/22 116 kg (255 lb)   05/26/22 114 kg (252 lb)     · POA with increased weight gain, SOB and worsening LE edema   · BNP at presentation-74  · EKG in ED:  Rate 70s, NSR  · Compliant with diuretics; no dietary indiscretion  Denies episodes of afib since her recent ED visit  · At recent ED visit, Torsemide inc from 20 mg to 60 mg daily  · Dry Wt = 244    Plan:  · Cont lasix 60mg BID   · I/Os, daily weights  · Oxygen as needed to maintain saturations above 89%; does not require oxygen on 06/06 a m   During exam   · Low salt diet, FR 1800   · Cardiology consult

## 2022-06-06 NOTE — ASSESSMENT & PLAN NOTE
Lab Results   Component Value Date    HGBA1C 6 8 (H) 03/09/2022     Recent Labs     06/06/22  1549 06/06/22  2048 06/07/22  0722 06/07/22  1108   POCGLU 231* 206* 128 159*     Blood Sugar Average: Last 72 hrs:  (P) 214 75   Home Ashleyo solostar 25 U HS     Plan:  · lantus 25units at night  · Added 4units at mealtime on 6/6   · ISS and accuchecks   · Hypoglycemia protocol   · Diabetic diet   · Upon discharge, follow-up with PCP for better diabetic control

## 2022-06-06 NOTE — ASSESSMENT & PLAN NOTE
Wt Readings from Last 3 Encounters:   06/07/22 113 kg (248 lb 7 3 oz)   05/31/22 116 kg (255 lb)   05/26/22 114 kg (252 lb)     · POA with increased weight gain, SOB and worsening LE edema   · NT pro BNP 5/31: 1859, BNP 6/4-74  · EKG in ED:  Rate 70s, NSR  · Compliant with diuretics; no dietary indiscretion  Has noticed her urinary output decrease over the past 1 year  At recent ED visit, Torsemide inc from 20 mg to 60 mg daily  Took for 5 days prior to presentation  · Dry Wt = 244  · CXR:  No significant vascular congestion noted on my read  · Echo 3/22: LVEF 53%    Plan:  · Lasix 60 mg BID w/ 1 dose of metalzone yesterday; inadequate diuresis--> Changed to IV Bumex 2 mg first dose 6/7 PM  · I/Os, daily weights  · Oxygen as needed to maintain saturations above 89%; does not require oxygen on 06/06 a m   During exam   · Low salt diet, FR 1800   · Cardiology team actively following

## 2022-06-06 NOTE — PLAN OF CARE
Problem: Potential for Falls  Goal: Patient will remain free of falls  Description: INTERVENTIONS:  - Educate patient/family on patient safety including physical limitations  - Instruct patient to call for assistance with activity   - Consult OT/PT to assist with strengthening/mobility   - Keep Call bell within reach  - Keep bed low and locked with side rails adjusted as appropriate  - Keep care items and personal belongings within reach  - Initiate and maintain comfort rounds  - Make Fall Risk Sign visible to staff  - Offer Toileting every  Hours, in advance of need  - Initiate/Maintain alarm  - Obtain necessary fall risk management equipment:   - Apply yellow socks and bracelet for high fall risk patients  - Consider moving patient to room near nurses station  6/6/2022 1240 by Hal Handley RN  Outcome: Progressing  6/6/2022 1239 by Hal Handley RN  Outcome: Progressing     Problem: MOBILITY - ADULT  Goal: Maintain or return to baseline ADL function  Description: INTERVENTIONS:  -  Assess patient's ability to carry out ADLs; assess patient's baseline for ADL function and identify physical deficits which impact ability to perform ADLs (bathing, care of mouth/teeth, toileting, grooming, dressing, etc )  - Assess/evaluate cause of self-care deficits   - Assess range of motion  - Assess patient's mobility; develop plan if impaired  - Assess patient's need for assistive devices and provide as appropriate  - Encourage maximum independence but intervene and supervise when necessary  - Involve family in performance of ADLs  - Assess for home care needs following discharge   - Consider OT consult to assist with ADL evaluation and planning for discharge  - Provide patient education as appropriate  6/6/2022 1240 by Hal Handley RN  Outcome: Progressing  6/6/2022 1239 by Hal Handley RN  Outcome: Progressing  Goal: Maintains/Returns to pre admission functional level  Description: INTERVENTIONS:  - Perform BMAT or MOVE assessment daily    - Set and communicate daily mobility goal to care team and patient/family/caregiver  - Collaborate with rehabilitation services on mobility goals if consulted  - Perform Range of Motion  times a day  - Reposition patient every  hours    - Dangle patient  times a day  - Stand patient  times a day  - Ambulate patient  times a day  - Out of bed to chair  times a day   - Out of bed for me times a day  - Out of bed for toileting  - Record patient progress and toleration of activity level   6/6/2022 1240 by Brissa Lopez RN  Outcome: Progressing  6/6/2022 1239 by Brissa Lopez RN  Outcome: Progressing     Problem: Prexisting or High Potential for Compromised Skin Integrity  Goal: Skin integrity is maintained or improved  Description: INTERVENTIONS:  - Identify patients at risk for skin breakdown  - Assess and monitor skin integrity  - Assess and monitor nutrition and hydration status  - Monitor labs   - Assess for incontinence   - Turn and reposition patient  - Assist with mobility/ambulation  - Relieve pressure over bony prominences  - Avoid friction and shearing  - Provide appropriate hygiene as needed including keeping skin clean and dry  - Evaluate need for skin moisturizer/barrier cream  - Collaborate with interdisciplinary team   - Patient/family teaching  - Consider wound care consult   6/6/2022 1240 by Brissa Lopez RN  Outcome: Progressing  6/6/2022 1239 by Brissa Lopez RN  Outcome: Progressing     Problem: CARDIOVASCULAR - ADULT  Goal: Maintains optimal cardiac output and hemodynamic stability  Description: INTERVENTIONS:  - Monitor I/O, vital signs and rhythm  - Monitor for S/S and trends of decreased cardiac output  - Administer and titrate ordered vasoactive medications to optimize hemodynamic stability  - Assess quality of pulses, skin color and temperature  - Assess for signs of decreased coronary artery perfusion  - Instruct patient to report change in severity of symptoms  Outcome: Progressing  Goal: Absence of cardiac dysrhythmias or at baseline rhythm  Description: INTERVENTIONS:  - Continuous cardiac monitoring, vital signs, obtain 12 lead EKG if ordered  - Administer antiarrhythmic and heart rate control medications as ordered  - Monitor electrolytes and administer replacement therapy as ordered  Outcome: Progressing

## 2022-06-06 NOTE — ASSESSMENT & PLAN NOTE
· Mildly hypoxic on admission; wears nocturnal O2 for KATRINA, 2L O2  · Able to saturate appropriately on 06/06 AM  without oxygen  However, patient desaturated into 80s upon exertion on 6/6 PM    · Able to saturate appropriately on 6/7 AM without oxygen  · Etiology:  Likely in setting of acute exacerbation of heart failure       Plan:  · Monitor resp status off of oxygen, especially upon exertion  · Wean with diuresis - maintain Sat >89%  · Albuterol PRN

## 2022-06-06 NOTE — ASSESSMENT & PLAN NOTE
Lab Results   Component Value Date    HGBA1C 6 8 (H) 03/09/2022     Recent Labs     06/05/22  1109 06/05/22  1507 06/05/22  2123 06/06/22  0725   POCGLU 269* 222* 288* 171*     Blood Sugar Average: Last 72 hrs:  (P) 221 [id number]   Home Ashleyo solostar 25 U HS     Plan:  · lantus 25units at night  · ISS and accuchecks   · Hypoglycemia protocol   · Diabetic diet   · Upon discharge, follow-up with PCP for better diabetic control

## 2022-06-07 LAB
ANION GAP SERPL CALCULATED.3IONS-SCNC: 6 MMOL/L (ref 4–13)
BUN SERPL-MCNC: 70 MG/DL (ref 5–25)
CALCIUM SERPL-MCNC: 8.6 MG/DL (ref 8.4–10.2)
CHLORIDE SERPL-SCNC: 96 MMOL/L (ref 96–108)
CO2 SERPL-SCNC: 37 MMOL/L (ref 21–32)
CREAT SERPL-MCNC: 1.52 MG/DL (ref 0.6–1.3)
GFR SERPL CREATININE-BSD FRML MDRD: 34 ML/MIN/1.73SQ M
GLUCOSE SERPL-MCNC: 124 MG/DL (ref 65–140)
GLUCOSE SERPL-MCNC: 128 MG/DL (ref 65–140)
GLUCOSE SERPL-MCNC: 159 MG/DL (ref 65–140)
GLUCOSE SERPL-MCNC: 165 MG/DL (ref 65–140)
GLUCOSE SERPL-MCNC: 240 MG/DL (ref 65–140)
POTASSIUM SERPL-SCNC: 4.4 MMOL/L (ref 3.5–5.3)
SODIUM SERPL-SCNC: 139 MMOL/L (ref 135–147)

## 2022-06-07 PROCEDURE — 99232 SBSQ HOSP IP/OBS MODERATE 35: CPT | Performed by: INTERNAL MEDICINE

## 2022-06-07 PROCEDURE — 99233 SBSQ HOSP IP/OBS HIGH 50: CPT | Performed by: INTERNAL MEDICINE

## 2022-06-07 PROCEDURE — 80048 BASIC METABOLIC PNL TOTAL CA: CPT

## 2022-06-07 PROCEDURE — 82948 REAGENT STRIP/BLOOD GLUCOSE: CPT

## 2022-06-07 RX ORDER — BUMETANIDE 0.25 MG/ML
2 INJECTION, SOLUTION INTRAMUSCULAR; INTRAVENOUS
Status: DISCONTINUED | OUTPATIENT
Start: 2022-06-07 | End: 2022-06-08

## 2022-06-07 RX ADMIN — APIXABAN 5 MG: 5 TABLET, FILM COATED ORAL at 15:41

## 2022-06-07 RX ADMIN — FUROSEMIDE 60 MG: 10 INJECTION, SOLUTION INTRAMUSCULAR; INTRAVENOUS at 08:00

## 2022-06-07 RX ADMIN — METOPROLOL TARTRATE 50 MG: 50 TABLET, FILM COATED ORAL at 08:00

## 2022-06-07 RX ADMIN — INSULIN LISPRO 4 UNITS: 100 INJECTION, SOLUTION INTRAVENOUS; SUBCUTANEOUS at 08:01

## 2022-06-07 RX ADMIN — BUMETANIDE 2 MG: 0.25 INJECTION, SOLUTION INTRAMUSCULAR; INTRAVENOUS at 15:43

## 2022-06-07 RX ADMIN — PREGABALIN 100 MG: 100 CAPSULE ORAL at 08:00

## 2022-06-07 RX ADMIN — INSULIN LISPRO 1 UNITS: 100 INJECTION, SOLUTION INTRAVENOUS; SUBCUTANEOUS at 17:14

## 2022-06-07 RX ADMIN — INSULIN LISPRO 4 UNITS: 100 INJECTION, SOLUTION INTRAVENOUS; SUBCUTANEOUS at 17:15

## 2022-06-07 RX ADMIN — METOPROLOL TARTRATE 50 MG: 50 TABLET, FILM COATED ORAL at 21:21

## 2022-06-07 RX ADMIN — PRAVASTATIN SODIUM 80 MG: 80 TABLET ORAL at 15:42

## 2022-06-07 RX ADMIN — PREGABALIN 100 MG: 100 CAPSULE ORAL at 15:41

## 2022-06-07 RX ADMIN — INSULIN LISPRO 3 UNITS: 100 INJECTION, SOLUTION INTRAVENOUS; SUBCUTANEOUS at 21:22

## 2022-06-07 RX ADMIN — APIXABAN 5 MG: 5 TABLET, FILM COATED ORAL at 08:00

## 2022-06-07 RX ADMIN — INSULIN LISPRO 4 UNITS: 100 INJECTION, SOLUTION INTRAVENOUS; SUBCUTANEOUS at 11:37

## 2022-06-07 RX ADMIN — INSULIN LISPRO 1 UNITS: 100 INJECTION, SOLUTION INTRAVENOUS; SUBCUTANEOUS at 11:38

## 2022-06-07 RX ADMIN — MONTELUKAST 10 MG: 10 TABLET, FILM COATED ORAL at 08:00

## 2022-06-07 RX ADMIN — INSULIN GLARGINE 25 UNITS: 100 INJECTION, SOLUTION SUBCUTANEOUS at 21:21

## 2022-06-07 RX ADMIN — ACETAMINOPHEN 975 MG: 325 TABLET ORAL at 15:41

## 2022-06-07 NOTE — PROGRESS NOTES
Progress Note - Cardiology   Ankur Griggs 73 y o  female MRN: [id number]  Unit/Bed#: S -01 Encounter: [id number]    Assessment:  Principal Problem:    Acute on chronic diastolic CHF (congestive heart failure) ([facility])  Active Problems:    Paroxysmal atrial fibrillation (HCC)    Diabetes mellitus, type 2 (HCC)    Acute respiratory failure with hypoxia (HCC)    KATRINA (obstructive sleep apnea)    Inadequate diuresis yesterday with addition of metolazone to her 60mg IV lasix dose  Creatinine remains stable  PAF, was in sinus rhythm on admission, still sounds regular  Plan:    Try changing lasix to bumex, give 2mg this evening  No metolazone today given the change to bumex  Continue metoprolol and eliquis for PAF      Subjective/Objective     Subjective:     Still c/o shortness of breath  No significant additional diuresis with metolazone  Renal function is relatively stable    Weight not significantly changed, reports dry weight at 244    Objective:    Vitals: /59 (BP Location: Left arm)   Pulse 73   Temp 97 7 °F (36 5 °C) (Oral)   Resp 18   Ht 5' 2" (1 575 m)   Wt 113 kg (248 lb 7 3 oz)   SpO2 98%   BMI 45 44 kg/m²   Vitals:    06/06/22 1116 06/07/22 0600   Weight: 113 kg (248 lb 6 4 oz) 113 kg (248 lb 7 3 oz)     Orthostatic Blood Pressures    Flowsheet Row Most Recent Value   Blood Pressure 118/59 filed at 06/07/2022 0700   Patient Position - Orthostatic VS Lying filed at 06/07/2022 0700            Intake/Output Summary (Last 24 hours) at 6/7/2022 0950  Last data filed at 6/7/2022 0553  Gross per 24 hour   Intake 240 ml   Output 1260 ml   Net -1020 ml     Physical Exam:   General appearance: alert and in no acute distress  Head: Normocephalic, without obvious abnormality, atraumatic  Neck: no carotid bruit, no JVD and supple, symmetrical, trachea midline  Lungs: decreased at bases  Heart: S1, S2 regular  Abdomen: soft, non-tender; bowel sounds normal; no masses,  no organomegaly  Extremities: 1+ edema  Pulses: 2+ and symmetric bilaterally  Skin: Skin color, texture, turgor normal  No rashes or lesions  Neurologic: Grossly normal  Alert and oriented      Medications:    Current Facility-Administered Medications:     acetaminophen (TYLENOL) tablet 975 mg, 975 mg, Oral, Q8H PRN, Radha Rosenbaum MD, 975 mg at 06/05/22 2201    albuterol (PROVENTIL HFA,VENTOLIN HFA) inhaler 2 puff, 2 puff, Inhalation, Q6H PRN, Jacob Serna MD    [facility]) tablet 5 mg, 5 mg, Oral, BID, Jacob Serna MD, 5 mg at 06/07/22 0800    furosemide (LASIX) injection 60 mg, 60 mg, Intravenous, BID (diuretic), Jacob Serna MD, 60 mg at 06/07/22 0800    insulin glargine (LANTUS) subcutaneous injection 25 Units 0 25 mL, 25 Units, Subcutaneous, HS, Jacob Serna MD, 25 Units at 06/06/22 2129    insulin lispro (HumaLOG) 100 units/mL subcutaneous injection 1-6 Units, 1-6 Units, Subcutaneous, TID AC, 3 Units at 06/06/22 1743 **AND** Fingerstick Glucose (POCT), , , TID AC, Jacob Serna MD    insulin lispro (HumaLOG) 100 units/mL subcutaneous injection 1-6 Units, 1-6 Units, Subcutaneous, HS, Jacob Serna MD, 2 Units at 06/06/22 2129    insulin lispro (HumaLOG) 100 units/mL subcutaneous injection 4 Units, 4 Units, Subcutaneous, TID With Meals, Arnoldo Flowers MD, 4 Units at 06/07/22 0801    metoprolol tartrate (LOPRESSOR) tablet 50 mg, 50 mg, Oral, Q12H [address], Jacob Serna MD, 50 mg at 06/07/22 0800    montelukast (SINGULAIR) tablet 10 mg, 10 mg, Oral, Daily, Jacob Serna MD, 10 mg at 06/07/22 0800    pravastatin (PRAVACHOL) tablet 80 mg, 80 mg, Oral, Daily With Hector Lee MD, 80 mg at 06/06/22 1742    pregabalin (LYRICA) capsule 100 mg, 100 mg, Oral, BID, Jacob Serna MD, 100 mg at 06/07/22 0800    Lab Results:      Results from last 7 days   Lab Units 06/04/22  1547   WBC Thousand/uL 8 08   HEMOGLOBIN g/dL 11 0*   HEMATOCRIT % 35 8   PLATELETS Thousands/uL 181         Results from last 7 days   Lab Units 06/07/22  0520 06/06/22  0442 06/05/22  0345 06/04/22  1547 05/31/22  1531   SODIUM mmol/L 139 140 141 139 138   POTASSIUM mmol/L 4 4 4 2 4 3 4 5 5 3   CHLORIDE mmol/L 96 97 100 98 102   CO2 mmol/L 37* 36* 36* 32 31   BUN mg/dL 70* 66* 68* 69* 64*   CREATININE mg/dL 1 52* 1 46* 1 64* 1 62* 1 79*   CALCIUM mg/dL 8 6 8 4 8 6 8 8 8 3   ALK PHOS U/L  --   --   --  61 64   ALT U/L  --   --   --  12 20   AST U/L  --   --   --  15 27     Results from last 7 days   Lab Units 06/04/22  1547   INR  1 25*   PTT seconds 31     Results from last 7 days   Lab Units 06/05/22  0345   MAGNESIUM mg/dL 2 0       Telemetry: not on tele    Echo: 3/10/22:      Left Ventricle: Left ventricular cavity size is normal  Wall thickness is mildly increased  The left ventricular ejection fraction is 53% by biplane measurement  Systolic function is low normal  Wall motion is normal  There is borderline concentric hypertrophy    Right Ventricle: Systolic function is low normal  Normal tricuspid annular plane systolic excursion (TAPSE) > 1 7 cm    Study Details: This transthoracic echocardiogram was performed at bedside  This was a routine, inpatient study  Study quality was poor  This was a technically difficult study due to decreased penetration, restricted mobility and poor acoustic windows  A complete 2D, color flow Doppler, spectral Doppler, 2D, color flow Doppler and spectral Doppler transthoracic echocardiogram was performed  The apical, parasternal, subcostal and suprasternal views were obtained  0 6 ml/min of Definity ultrasound enhancing agent was used due to opacify the left ventricle  Mattie Richard  Prior TTE study available for comparison  Prior study date: 5/29/2019  No significant changes noted compared to the prior study

## 2022-06-07 NOTE — PLAN OF CARE
Problem: Potential for Falls  Goal: Patient will remain free of falls  Description: INTERVENTIONS:  - Educate patient/family on patient safety including physical limitations  - Instruct patient to call for assistance with activity   - Consult OT/PT to assist with strengthening/mobility   - Keep Call bell within reach  - Keep bed low and locked with side rails adjusted as appropriate  - Keep care items and personal belongings within reach  - Initiate and maintain comfort rounds  - Make Fall Risk Sign visible to staff  - Offer Toileting every  Hours, in advance of need  - Initiate/Maintain alarm  - Obtain necessary fall risk management equipment:   - Apply yellow socks and bracelet for high fall risk patients  - Consider moving patient to room near nurses station  Outcome: Progressing     Problem: MOBILITY - ADULT  Goal: Maintain or return to baseline ADL function  Description: INTERVENTIONS:  -  Assess patient's ability to carry out ADLs; assess patient's baseline for ADL function and identify physical deficits which impact ability to perform ADLs (bathing, care of mouth/teeth, toileting, grooming, dressing, etc )  - Assess/evaluate cause of self-care deficits   - Assess range of motion  - Assess patient's mobility; develop plan if impaired  - Assess patient's need for assistive devices and provide as appropriate  - Encourage maximum independence but intervene and supervise when necessary  - Involve family in performance of ADLs  - Assess for home care needs following discharge   - Consider OT consult to assist with ADL evaluation and planning for discharge  - Provide patient education as appropriate  Outcome: Progressing  Goal: Maintains/Returns to pre admission functional level  Description: INTERVENTIONS:  - Perform BMAT or MOVE assessment daily    - Set and communicate daily mobility goal to care team and patient/family/caregiver     - Collaborate with rehabilitation services on mobility goals if consulted  - Perform Range of Motion  times a day  - Reposition patient every  hours    - Dangle patient  times a da  - Stand patient  times a day  - Ambulate patient  times a day  - Out of bed to chair  times a day   - Out of bed for meals times a day  - Out of bed for toileting  - Record patient progress and toleration of activity level   Outcome: Progressing

## 2022-06-07 NOTE — ASSESSMENT & PLAN NOTE
Lab Results   Component Value Date    HGBA1C 6 8 (H) 03/09/2022     Recent Labs     06/07/22  1521 06/07/22  2056 06/08/22  0805 06/08/22  1041   POCGLU 165* 240* 170* 248*     Blood Sugar Average: Last 72 hrs:  (P) 215 [id number]   Home Tujeo solostar 25 U HS     Plan:  · lantus 25units at night  · Added 4units at mealtime on 6/6   · ISS and accuchecks   · Hypoglycemia protocol   · Diabetic diet   · Upon discharge, follow-up with PCP for better diabetic control

## 2022-06-07 NOTE — PROGRESS NOTES
[facility]  Progress Note Renata [place] 1950, 73 y o  female MRN: [id number]  Unit/Bed#: S -01 Encounter: [id number]  Primary Care Provider: Nasim Stanton MD   Date and time admitted to hospital: 6/4/2022  2:34 PM    * Acute on chronic diastolic CHF (congestive heart failure) ([facility] )  Assessment & Plan  Wt Readings from Last 3 Encounters:   06/07/22 113 kg (248 lb 7 3 oz)   05/31/22 116 kg (255 lb)   05/26/22 114 kg (252 lb)     · POA with increased weight gain, SOB and worsening LE edema   · NT pro BNP 5/31: 1859, BNP 6/4-74  · EKG in ED:  Rate 70s, NSR  · Compliant with diuretics; no dietary indiscretion  Has noticed her urinary output decrease over the past 1 year  At recent ED visit, Torsemide inc from 20 mg to 60 mg daily  Took for 5 days prior to presentation  · Dry Wt = 244  · CXR:  No significant vascular congestion noted on my read  · Echo 3/22: LVEF 53%    Plan:  · Lasix 60 mg BID w/ 1 dose of metalzone yesterday; inadequate diuresis--> Changed to IV Bumex 2 mg first dose 6/7 PM  · I/Os, daily weights  · Oxygen as needed to maintain saturations above 89%; does not require oxygen on 06/07 a m  During exam while at rest   · Low salt diet, FR 1800   · Cardiology team actively following    Acute respiratory failure with hypoxia ([facility] )  Assessment & Plan  · Mildly hypoxic on admission; wears nocturnal O2 for KATRINA, 2L O2  · Able to saturate appropriately on 06/06 AM  without oxygen  However, patient desaturated into 80s upon exertion on 6/6 PM    · Able to saturate appropriately on 6/7 AM without oxygen  · Etiology:  Likely in setting of acute exacerbation of heart failure  Plan:  · Monitor resp status off of oxygen, especially upon exertion  · Wean with diuresis - maintain Sat >89%  · Albuterol PRN      Paroxysmal atrial fibrillation (HCC)  Assessment & Plan  · Rate controlled    Regular rhythm noted on exam     Plan:   · Cont metoprolol BID   · Cont apixaban 5 mg b i d  Diabetes mellitus, type 2 [facility])  Assessment & Plan  Lab Results   Component Value Date    HGBA1C 6 8 (H) 2022     Recent Labs     22  1549 22  2048 22  0722 22  1108   POCGLU 231* 206* 128 159*     Blood Sugar Average: Last 72 hrs:  (P) 214 75   Home Tujeo solostar 25 U HS     Plan:  · lantus 25units at night  · Added 4units at mealtime on    · ISS and accuchecks   · Hypoglycemia protocol   · Diabetic diet   · Upon discharge, follow-up with PCP for better diabetic control    KATRINA (obstructive sleep apnea)  Assessment & Plan  · On nocturnal O2   · Non compliant with BIPAP     Plan:   · outpt follow-up with sleep medicine upon discharge    VTE Pharmacologic Prophylaxis: VTE Score: 7 High Risk (Score >/= 5) - Pharmacological DVT Prophylaxis Ordered: apixaban (Eliquis)  Sequential Compression Devices Ordered  Patient Centered Rounds: I performed bedside rounds with nursing staff today  Discussions with Specialists or Other Care Team Provider:  Cardiology    Education and Discussions with Family / Patient: Updated  (niece) via phone  during morning rounds, [facility] team spoke with niece on the phone  Current Length of Stay: 3 day(s)  Current Patient Status: Inpatient   Discharge Plan: Anticipate discharge in 48 hrs to home  Code Status: Level 1 - Full Code    Subjective:   Patient seen and examined at bedside  Overnight, desaturated into 80s when walked to the bathroom w/out oxygen  Otherwise, no events  Denies chest pain, pnd, orthopnea, worsening SOB, abdominal pain, nausea vomiting, fever, chills, headache, dizziness or palpitations  She says she has not been urinating as frequently as she was previously urinating when diuretics were initially prescribed       Objective:     Vitals:   Temp (24hrs), Av 8 °F (36 6 °C), Min:97 7 °F (36 5 °C), Max:98 1 °F (36 7 °C)    Temp:  [97 7 °F (36 5 °C)-98 1 °F (36 7 °C)] 97 7 °F (36 5 °C)  HR:  [67-75] 73  Resp: [18] 18  BP: (118-140)/(56-60) 118/59  SpO2:  [98 %-100 %] 98 %  Body mass index is 45 44 kg/m²  Input and Output Summary (last 24 hours): Intake/Output Summary (Last 24 hours) at 6/7/2022 1219  Last data filed at 6/7/2022 0900  Gross per 24 hour   Intake 360 ml   Output 1260 ml   Net -900 ml     Physical Exam   Vitals reviewed  General Examination: Sitting up in chair, cooperative   HEENT: Normocephalic, Atraumatic  Extraocular movements intact, PERRLA  CVS: S1, S2 noted  No JVD noted  Lungs: CTA b/l  Abdomen: Soft, normal bowel sounds  Non distended, non tender  Ext: +1 b/l lower extremity edema noted  Psych: Though Process - logical    Skin: No bleeding/bruising noted  Neuro: A, Ox3  Follows simple, 3 steps commands  Appropriate antigravity strength in all 4 extremities proximally, distally  Additional Data:     Labs:  Results from last 7 days   Lab Units 06/04/22  1547   WBC Thousand/uL 8 08   HEMOGLOBIN g/dL 11 0*   HEMATOCRIT % 35 8   PLATELETS Thousands/uL 181   NEUTROS PCT % 58   LYMPHS PCT % 25   MONOS PCT % 10   EOS PCT % 6     Results from last 7 days   Lab Units 06/07/22  0520 06/05/22  0345 06/04/22  1547   SODIUM mmol/L 139   < > 139   POTASSIUM mmol/L 4 4   < > 4 5   CHLORIDE mmol/L 96   < > 98   CO2 mmol/L 37*   < > 32   BUN mg/dL 70*   < > 69*   CREATININE mg/dL 1 52*   < > 1 62*   ANION GAP mmol/L 6   < > 9   CALCIUM mg/dL 8 6   < > 8 8   ALBUMIN g/dL  --   --  3 8   TOTAL BILIRUBIN mg/dL  --   --  0 37   ALK PHOS U/L  --   --  61   ALT U/L  --   --  12   AST U/L  --   --  15   GLUCOSE RANDOM mg/dL 124   < > 196*    < > = values in this interval not displayed       Results from last 7 days   Lab Units 06/04/22  1547   INR  1 25*     Results from last 7 days   Lab Units 06/07/22  1108 06/07/22  0722 06/06/22  2048 06/06/22  1549 06/06/22  1046 06/06/22  0725 06/05/22 2123 06/05/22  1507 06/05/22  1109 06/05/22  0810 06/04/22 2121 06/04/22 1953   POC GLUCOSE mg/dl 159* 128 206* 231* 300* 171* 288* 222* 269* 215* 206* 182*             Lines/Drains:  Invasive Devices  Report    Peripheral Intravenous Line  Duration           Peripheral IV 06/06/22 Right;Distal Wrist <1 day              Imaging:  Reviewed pertinent reports from this admission including:  XR chest 2 views   ED Interpretation by Elfego Santana PA-C (06/04 1651)   Mild signs of vascular congestion        Final Result by Trav Thurman MD (06/05 3401)      No acute cardiopulmonary disease  Workstation performed: [id number]           Recent Cultures (last 7 days):         Last 24 Hours Medication List:   Current Facility-Administered Medications   Medication Dose Route Frequency Provider Last Rate    acetaminophen  975 mg Oral Q8H PRN Balbir Cerda MD      albuterol  2 puff Inhalation Q6H PRN Elaina Barriga MD      apixaban  5 mg Oral BID Elaina Barriga MD      bumetanide  2 mg Intravenous BID (diuretic) Gillian Patel MD      insulin glargine  25 Units Subcutaneous HS Elaina Barriga MD      insulin lispro  1-6 Units Subcutaneous TID [facility] Elaina Barriga MD      insulin lispro  1-6 Units Subcutaneous HS Elaina Barriga MD      insulin lispro  4 Units Subcutaneous TID With Meals Edwnia Quintana MD      metoprolol tartrate  50 mg Oral Q12H [address] Elaina Barriga MD      montelukast  10 mg Oral Daily Elaina Barriga MD      pravastatin  80 mg Oral Daily With Angeli Lora MD      pregabalin  100 mg Oral BID Elaina Barriga MD          Today, Patient Was Seen By: Edwina Quintana MD    **Please Note: This note may have been constructed using a voice recognition system  **

## 2022-06-07 NOTE — ASSESSMENT & PLAN NOTE
Wt Readings from Last 3 Encounters:   06/08/22 113 kg (248 lb 6 4 oz)   05/31/22 116 kg (255 lb)   05/26/22 114 kg (252 lb)     · POA with increased weight gain, SOB and worsening LE edema   · NT pro BNP 5/31: 1859, BNP 6/4-74  · EKG in ED:  Rate 70s, NSR  · Compliant with diuretics; no dietary indiscretion  Has noticed her urinary output decrease over the past 1 year  At recent ED visit, Torsemide inc from 20 mg to 60 mg daily  Took for 5 days prior to presentation  · Dry Wt = 244  · CXR:  No significant vascular congestion noted on my read  · Echo 3/22: LVEF 53%    Plan:  · IV Bumex 2 mg b i d   · I/Os, daily weights  · Oxygen as needed to maintain saturations above 89%; no longer requires oxygen during daytime  · Low salt diet, FR 1800   · Cardiology team actively following-will transition to p o   Diuretics as per their recommendations

## 2022-06-08 PROBLEM — R79.89 ELEVATED SERUM CREATININE: Status: ACTIVE | Noted: 2022-06-08

## 2022-06-08 LAB
ANION GAP SERPL CALCULATED.3IONS-SCNC: 7 MMOL/L (ref 4–13)
BUN SERPL-MCNC: 82 MG/DL (ref 5–25)
CALCIUM SERPL-MCNC: 8.7 MG/DL (ref 8.4–10.2)
CHLORIDE SERPL-SCNC: 95 MMOL/L (ref 96–108)
CO2 SERPL-SCNC: 35 MMOL/L (ref 21–32)
CREAT SERPL-MCNC: 1.71 MG/DL (ref 0.6–1.3)
GFR SERPL CREATININE-BSD FRML MDRD: 30 ML/MIN/1.73SQ M
GLUCOSE SERPL-MCNC: 170 MG/DL (ref 65–140)
GLUCOSE SERPL-MCNC: 190 MG/DL (ref 65–140)
GLUCOSE SERPL-MCNC: 203 MG/DL (ref 65–140)
GLUCOSE SERPL-MCNC: 240 MG/DL (ref 65–140)
GLUCOSE SERPL-MCNC: 248 MG/DL (ref 65–140)
POTASSIUM SERPL-SCNC: 3.9 MMOL/L (ref 3.5–5.3)
SODIUM SERPL-SCNC: 137 MMOL/L (ref 135–147)

## 2022-06-08 PROCEDURE — 99232 SBSQ HOSP IP/OBS MODERATE 35: CPT | Performed by: INTERNAL MEDICINE

## 2022-06-08 PROCEDURE — 82948 REAGENT STRIP/BLOOD GLUCOSE: CPT

## 2022-06-08 PROCEDURE — 80048 BASIC METABOLIC PNL TOTAL CA: CPT

## 2022-06-08 RX ORDER — TORSEMIDE 20 MG/1
40 TABLET ORAL
Status: DISCONTINUED | OUTPATIENT
Start: 2022-06-09 | End: 2022-06-10

## 2022-06-08 RX ORDER — TORSEMIDE 20 MG/1
20 TABLET ORAL
Status: DISCONTINUED | OUTPATIENT
Start: 2022-06-09 | End: 2022-06-10

## 2022-06-08 RX ADMIN — INSULIN LISPRO 1 UNITS: 100 INJECTION, SOLUTION INTRAVENOUS; SUBCUTANEOUS at 08:45

## 2022-06-08 RX ADMIN — INSULIN LISPRO 2 UNITS: 100 INJECTION, SOLUTION INTRAVENOUS; SUBCUTANEOUS at 21:17

## 2022-06-08 RX ADMIN — BUMETANIDE 2 MG: 0.25 INJECTION, SOLUTION INTRAMUSCULAR; INTRAVENOUS at 08:47

## 2022-06-08 RX ADMIN — INSULIN GLARGINE 25 UNITS: 100 INJECTION, SOLUTION SUBCUTANEOUS at 21:17

## 2022-06-08 RX ADMIN — PREGABALIN 100 MG: 100 CAPSULE ORAL at 16:36

## 2022-06-08 RX ADMIN — INSULIN LISPRO 4 UNITS: 100 INJECTION, SOLUTION INTRAVENOUS; SUBCUTANEOUS at 16:38

## 2022-06-08 RX ADMIN — PRAVASTATIN SODIUM 80 MG: 80 TABLET ORAL at 16:36

## 2022-06-08 RX ADMIN — APIXABAN 5 MG: 5 TABLET, FILM COATED ORAL at 16:36

## 2022-06-08 RX ADMIN — INSULIN LISPRO 4 UNITS: 100 INJECTION, SOLUTION INTRAVENOUS; SUBCUTANEOUS at 08:45

## 2022-06-08 RX ADMIN — APIXABAN 5 MG: 5 TABLET, FILM COATED ORAL at 08:44

## 2022-06-08 RX ADMIN — METOPROLOL TARTRATE 50 MG: 50 TABLET, FILM COATED ORAL at 08:43

## 2022-06-08 RX ADMIN — MONTELUKAST 10 MG: 10 TABLET, FILM COATED ORAL at 08:43

## 2022-06-08 RX ADMIN — INSULIN LISPRO 4 UNITS: 100 INJECTION, SOLUTION INTRAVENOUS; SUBCUTANEOUS at 12:45

## 2022-06-08 RX ADMIN — INSULIN LISPRO 3 UNITS: 100 INJECTION, SOLUTION INTRAVENOUS; SUBCUTANEOUS at 16:36

## 2022-06-08 RX ADMIN — PREGABALIN 100 MG: 100 CAPSULE ORAL at 08:43

## 2022-06-08 RX ADMIN — INSULIN LISPRO 3 UNITS: 100 INJECTION, SOLUTION INTRAVENOUS; SUBCUTANEOUS at 12:45

## 2022-06-08 RX ADMIN — METOPROLOL TARTRATE 50 MG: 50 TABLET, FILM COATED ORAL at 21:18

## 2022-06-08 NOTE — PROGRESS NOTES
[facility]  Progress Note Emilia Velazquez 1950, 73 y o  female MRN: [id number]  Unit/Bed#: S -01 Encounter: [id number]  Primary Care Provider: Jarad Austin MD   Date and time admitted to hospital: 6/4/2022  2:34 PM    * Acute on chronic diastolic CHF (congestive heart failure) ([address] )  Assessment & Plan  Wt Readings from Last 3 Encounters:   06/08/22 113 kg (248 lb 6 4 oz)   05/31/22 116 kg (255 lb)   05/26/22 114 kg (252 lb)     · POA with increased weight gain, SOB and worsening LE edema   · NT pro BNP 5/31: 1859, BNP 6/4-74  · EKG in ED:  Rate 70s, NSR  · Compliant with diuretics; no dietary indiscretion  Has noticed her urinary output decrease over the past 1 year  At recent ED visit, Torsemide inc from 20 mg to 60 mg daily  Took for 5 days prior to presentation  · Dry Wt = 244  · CXR:  No significant vascular congestion noted on my read  · Echo 3/22: LVEF 53%    Plan:  · IV Bumex 2 mg b i d   · I/Os, daily weights  · Oxygen as needed to maintain saturations above 89%; no longer requires oxygen during daytime  · Low salt diet, FR 1800   · Cardiology team actively following-will transition to p o  Diuretics as per their recommendations    Elevated serum creatinine  Assessment & Plan  Lab Results   Component Value Date    EGFR 30 06/08/2022    EGFR 34 06/07/2022    EGFR 36 06/06/2022    CREATININE 1 71 (H) 06/08/2022    CREATININE 1 52 (H) 06/07/2022    CREATININE 1 46 (H) 06/06/2022     · Likely in setting of aggressive diuresis  · Baseline 1 4-1 6  · Monitor BMP    Acute respiratory failure with hypoxia (HCC)  Assessment & Plan  · Mildly hypoxic on admission; wears nocturnal O2 for KATRINA, 2L O2  · Able to saturate appropriately on 06/06 AM  without oxygen  However, patient desaturated into 80s upon exertion on 6/6 PM    · Able to saturate appropriately on 6/7 AM without oxygen  · Etiology:  Likely in setting of acute exacerbation of heart failure  Plan:  · Monitor resp status off of oxygen, especially upon exertion  · Wean with diuresis - maintain Sat >89%  · Albuterol PRN      Paroxysmal atrial fibrillation (HCC)  Assessment & Plan  · Rate controlled  Regular rhythm noted on exam     Plan:   · Cont metoprolol BID   · Cont apixaban 5 mg b i d  Diabetes mellitus, type 2 [facility])  Assessment & Plan  Lab Results   Component Value Date    HGBA1C 6 8 (H) 03/09/2022     Recent Labs     06/07/22  1521 06/07/22  2056 06/08/22  0805 06/08/22  1041   POCGLU 165* 240* 170* 248*     Blood Sugar Average: Last 72 hrs:  (P) 215 [id number]   Home Tujeo solostar 25 U HS     Plan:  · lantus 25units at night  · Added 4units at mealtime on 6/6   · ISS and accuchecks   · Hypoglycemia protocol   · Diabetic diet   · Upon discharge, follow-up with PCP for better diabetic control    KATRINA (obstructive sleep apnea)  Assessment & Plan  · On nocturnal O2   · Non compliant with BIPAP     Plan:   · outpt follow-up with sleep medicine upon discharge    VTE Pharmacologic Prophylaxis: VTE Score: 7 High Risk (Score >/= 5) - Pharmacological DVT Prophylaxis Ordered: apixaban (Eliquis)  Sequential Compression Devices Ordered  Patient Centered Rounds: I performed bedside rounds with nursing staff today  Discussions with Specialists or Other Care Team Provider:  Cardiology    Education and Discussions with Family / Patient: Updated  (niece) via phone  during morning rounds, IM team spoke with niece on the phone  Current Length of Stay: 4 day(s)  Current Patient Status: Inpatient   Discharge Plan: Anticipate discharge in 48 hrs to home  Code Status: Level 1 - Full Code    Subjective:   Patient seen and examined at bedside  No significant events overnight  Required oxygen only during the night  Was able to walk in her room and use the bathroom without use of oxygen    Denies chest pain, pnd, orthopnea, worsening SOB, abdominal pain, nausea vomiting, fever, chills, headache, dizziness or palpitations  She says she has been urinating more frequently since yesterday  Objective:     Vitals:   Temp (24hrs), Av 6 °F (37 °C), Min:98 4 °F (36 9 °C), Max:98 9 °F (37 2 °C)    Temp:  [98 4 °F (36 9 °C)-98 9 °F (37 2 °C)] 98 6 °F (37 °C)  HR:  [72-73] 72  Resp:  [18-19] 18  BP: (124-132)/(53-60) 128/53  SpO2:  [94 %-100 %] 96 %  Body mass index is 45 43 kg/m²  Input and Output Summary (last 24 hours): Intake/Output Summary (Last 24 hours) at 2022 1359  Last data filed at 2022 1248  Gross per 24 hour   Intake 360 ml   Output 2250 ml   Net -1890 ml     Physical Exam   Vitals reviewed  General Examination: Sitting up in chair, cooperative   HEENT: Normocephalic, Atraumatic  Extraocular movements intact, PERRLA  CVS: S1, S2 noted  No JVD noted  Lungs:  Decreased breath sounds bilaterally  No longer on oxygen  Abdomen: Soft, normal bowel sounds  Non distended, non tender  Ext:  Mild b/l lower extremity edema noted (improved since yesterday)  Psych: Though Process - logical    Skin: No bleeding/bruising noted  Neuro: A, Ox3  Follows simple, 3 steps commands  Appropriate antigravity strength in all 4 extremities proximally, distally       Additional Data:     Labs:  Results from last 7 days   Lab Units 22  1547   WBC Thousand/uL 8 08   HEMOGLOBIN g/dL 11 0*   HEMATOCRIT % 35 8   PLATELETS Thousands/uL 181   NEUTROS PCT % 58   LYMPHS PCT % 25   MONOS PCT % 10   EOS PCT % 6     Results from last 7 days   Lab Units 22  0522 22  0345 22  1547   SODIUM mmol/L 137   < > 139   POTASSIUM mmol/L 3 9   < > 4 5   CHLORIDE mmol/L 95*   < > 98   CO2 mmol/L 35*   < > 32   BUN mg/dL 82*   < > 69*   CREATININE mg/dL 1 71*   < > 1 62*   ANION GAP mmol/L 7   < > 9   CALCIUM mg/dL 8 7   < > 8 8   ALBUMIN g/dL  --   --  3 8   TOTAL BILIRUBIN mg/dL  --   --  0 37   ALK PHOS U/L  --   --  61   ALT U/L  --   --  12   AST U/L  --   --  15   GLUCOSE RANDOM mg/dL 190*   < > 196*    < > = values in this interval not displayed  Results from last 7 days   Lab Units 06/04/22  1547   INR  1 25*     Results from last 7 days   Lab Units 06/08/22  1041 06/08/22  0805 06/07/22  2056 06/07/22  1521 06/07/22  1108 06/07/22  0722 06/06/22  2048 06/06/22  1549 06/06/22  1046 06/06/22  0725 06/05/22  2123 06/05/22  1507   POC GLUCOSE mg/dl 248* 170* 240* 165* 159* 128 206* 231* 300* 171* 288* 222*             Lines/Drains:  Invasive Devices  Report    Peripheral Intravenous Line  Duration           Peripheral IV 06/06/22 Right;Distal Wrist 2 days              Imaging:  Reviewed pertinent reports from this admission including:  XR chest 2 views   ED Interpretation by Chintan Christianson PA-C (06/04 1651)   Mild signs of vascular congestion        Final Result by Kellie Coreas MD (06/05 3521)      No acute cardiopulmonary disease                    Workstation performed: [id number]           Recent Cultures (last 7 days):         Last 24 Hours Medication List:   Current Facility-Administered Medications   Medication Dose Route Frequency Provider Last Rate    acetaminophen  975 mg Oral Q8H PRN Sidney Prieto MD      albuterol  2 puff Inhalation Q6H PRN Nathalie Toscano MD      apixaban  5 mg Oral BID Nathalie Toscano MD      insulin glargine  25 Units Subcutaneous HS Nathalie Toscano MD      insulin lispro  1-6 Units Subcutaneous TID [facility] Nathalie Toscano MD      insulin lispro  1-6 Units Subcutaneous HS Nathalie Toscano MD      insulin lispro  4 Units Subcutaneous TID With Meals Arnie Kwok MD      metoprolol tartrate  50 mg Oral Q12H [address] Nathalei Toscano MD      montelukast  10 mg Oral Daily Nathalie Toscano MD      pravastatin  80 mg Oral Daily With Andre Pisano MD      pregabalin  100 mg Oral BID Nathalie Toscano MD      [START ON 6/9/2022] torsemide  20 mg Oral After [employer], CRNP      [START ON 6/9/2022] torsemide  40 mg Oral Daily Before Klarissa Cavazos          Today, Patient Was Seen By: Vinicius Kuhn MD    **Please Note: This note may have been constructed using a voice recognition system  **

## 2022-06-08 NOTE — ASSESSMENT & PLAN NOTE
· On nocturnal O2   · Non compliant with BIPAP     Plan:   · Counseled pt extensively regarding BiPAP compliance  · Upon dc, f/u with outpatient sleep medicine team

## 2022-06-08 NOTE — ASSESSMENT & PLAN NOTE
· Mildly hypoxic on admission; wears nocturnal O2 for KATRINA, 2L O2  · Able to saturate appropriately on 06/06 AM  without oxygen  However, patient desaturated into 80s upon exertion on 6/6 PM    · Back to baseline O2 requirements on 6/8 AM (noctural use only)  · Etiology:  Likely in setting of acute exacerbation of heart failure       Plan:  · Monitor resp status off of oxygen, especially upon exertion  · Wean with diuresis - maintain Sat >89%  · Albuterol PRN

## 2022-06-08 NOTE — PROGRESS NOTES
Progress Note - Cardiology Team 1  Jackelyn Chavez 73 y o  female MRN: [id number]  Unit/Bed#: S -01 Encounter: [id number]        Principal Problem:    Acute on chronic diastolic CHF (congestive heart failure) (HCC)  Active Problems:    Paroxysmal atrial fibrillation (HCC)    Diabetes mellitus, type 2 (HCC)    Acute respiratory failure with hypoxia (HCC)    KATRINA (obstructive sleep apnea)        Assessment/Plan     1  Acute on chronic diastolic heart failure  IV Lasix 60 mg b i d  with one time dose metolazone  Inadequate diuresis  6/7 Changed to bumex 2mg BID  Net negative 1230 ? Accuracy- minimal intake reported  Weight 248 lbs   BNP 74  ProBNP 5/31/2022- 1859  TTE 3/2022- LVEF 53%  CHF teaching  [facility] 5/16 to 5/23- AFib with RVR and acute on chronic diastolic heart failure  Discharge weight 247 lb  Lopressor 50 mg every 12 hours  Torsemide 20 mg daily       ED visit 05/31-torsemide increased to 60 mg  Reports 10 lb weight gain in 2 weeks    6/8 somewhat improved diuresis  Still with some crackles bases/ LE edema  BUN/Creat- 82/1  71  Will address with team with possible transition to oral diuretics  Check pulse ox with ambulation     2  PAF- not currently on telemetry  HR documented controlled  Presented in sinus rhythm  AC-Eliquis 5 mg b i d  Lopressor 50 mg every 12 hours  Encouraged to be compliant with CPAP  Hospital 5/16-5/23- AFib with RVR and acute on chronic diastolic heart failure     3  Hypertension-controlled  Currently on Lopressor 50 mg twice a day     4  HLD-pravastatin 80 mg daily     5  CKD- BUN/creat escalating  Monitor with diuresis     6  Obstructive sleep apnea-trying to adjust to CPAP  Suspected to be triggering her atrial fibrillation         Subjective/Objective   Chief Complaint/Subjective  Patient denies SOB  Reports improved diuresis   No CP      Vitals: /53 (BP Location: Left arm)   Pulse 72   Temp 98 6 °F (37 °C) (Oral)   Resp 18   Ht 5' 2" (1 575 m)   Wt 113 kg (248 lb 6 4 oz)   SpO2 96%   BMI 45 43 kg/m²     Vitals:    06/07/22 0600 06/08/22 0600   Weight: 113 kg (248 lb 7 3 oz) 113 kg (248 lb 6 4 oz)     Orthostatic Blood Pressures    Flowsheet Row Most Recent Value   Blood Pressure 128/53 filed at 06/08/2022 0744   Patient Position - Orthostatic VS Lying filed at 06/08/2022 0744            Intake/Output Summary (Last 24 hours) at 6/8/2022 1010  Last data filed at 6/8/2022 0801  Gross per 24 hour   Intake 0 ml   Output 1650 ml   Net -1650 ml       Invasive Devices  Report    Peripheral Intravenous Line  Duration           Peripheral IV 06/06/22 Right;Distal Wrist 1 day                Current Facility-Administered Medications   Medication Dose Route Frequency    acetaminophen (TYLENOL) tablet 975 mg  975 mg Oral Q8H PRN    albuterol (PROVENTIL HFA,VENTOLIN HFA) inhaler 2 puff  2 puff Inhalation Q6H PRN    apixaban (ELIQUIS) tablet 5 mg  5 mg Oral BID    bumetanide (BUMEX) injection 2 mg  2 mg Intravenous BID (diuretic)    insulin glargine (LANTUS) subcutaneous injection 25 Units 0 25 mL  25 Units Subcutaneous HS    insulin lispro (HumaLOG) 100 units/mL subcutaneous injection 1-6 Units  1-6 Units Subcutaneous TID AC    insulin lispro (HumaLOG) 100 units/mL subcutaneous injection 1-6 Units  1-6 Units Subcutaneous HS    insulin lispro (HumaLOG) 100 units/mL subcutaneous injection 4 Units  4 Units Subcutaneous TID With Meals    metoprolol tartrate (LOPRESSOR) tablet 50 mg  50 mg Oral Q12H SELMA    montelukast (SINGULAIR) tablet 10 mg  10 mg Oral Daily    pravastatin (PRAVACHOL) tablet 80 mg  80 mg Oral Daily With Dinner    pregabalin (LYRICA) capsule 100 mg  100 mg Oral BID         Physical Exam: /53 (BP Location: Left arm)   Pulse 72   Temp 98 6 °F (37 °C) (Oral)   Resp 18   Ht 5' 2" (1 575 m)   Wt 113 kg (248 lb 6 4 oz)   SpO2 96%   BMI 45 43 kg/m²     General Appearance:    Alert, cooperative, no distress, appears stated age   Head: Normocephalic, no scleral icterus   Eyes:    PERRL   Nose:   Nares normal, septum midline, no drainage    Throat:   Lips, mucosa, and tongue normal   Neck:   Supple, symmetrical, trachea midline,              Lungs:     Crackles bases/decreased to auscultation bilaterally, respirations unlabored   Chest Wall:    No tenderness or deformity    Heart:    Regular rate and rhythm, S1 and S2 normal, no murmur, rub   or gallop   Abdomen:     Soft, non-tender, bowel sounds active all four quadrants,     no masses, no organomegaly   Extremities:   Extremities normal, atraumatic, no cyanosis ,mild  edema   Pulses:   2+ and symmetric all extremities   Skin:   Skin color, texture, turgor normal, no rashes or lesions   Neurologic:   Alert and oriented to person place and time, no focal deficits                 Lab Results:   Recent Results (from the past 72 hour(s))   Fingerstick Glucose (POCT)    Collection Time: 06/05/22 11:09 AM   Result Value Ref Range    POC Glucose 269 (H) 65 - 140 mg/dl   Fingerstick Glucose (POCT)    Collection Time: 06/05/22  3:07 PM   Result Value Ref Range    POC Glucose 222 (H) 65 - 140 mg/dl   Fingerstick Glucose (POCT)    Collection Time: 06/05/22  9:23 PM   Result Value Ref Range    POC Glucose 288 (H) 65 - 140 mg/dl   Basic metabolic panel    Collection Time: 06/06/22  4:42 AM   Result Value Ref Range    Sodium 140 135 - 147 mmol/L    Potassium 4 2 3 5 - 5 3 mmol/L    Chloride 97 96 - 108 mmol/L    CO2 36 (H) 21 - 32 mmol/L    ANION GAP 7 4 - 13 mmol/L    BUN 66 (H) 5 - 25 mg/dL    Creatinine 1 46 (H) 0 60 - 1 30 mg/dL    Glucose 151 (H) 65 - 140 mg/dL    Calcium 8 4 8 4 - 10 2 mg/dL    eGFR 36 ml/min/1 73sq m   Fingerstick Glucose (POCT)    Collection Time: 06/06/22  7:25 AM   Result Value Ref Range    POC Glucose 171 (H) 65 - 140 mg/dl   Fingerstick Glucose (POCT)    Collection Time: 06/06/22 10:46 AM   Result Value Ref Range    POC Glucose 300 (H) 65 - 140 mg/dl   Fingerstick Glucose (POCT) Collection Time: 06/06/22  3:49 PM   Result Value Ref Range    POC Glucose 231 (H) 65 - 140 mg/dl   Fingerstick Glucose (POCT)    Collection Time: 06/06/22  8:48 PM   Result Value Ref Range    POC Glucose 206 (H) 65 - 140 mg/dl   Basic metabolic panel    Collection Time: 06/07/22  5:20 AM   Result Value Ref Range    Sodium 139 135 - 147 mmol/L    Potassium 4 4 3 5 - 5 3 mmol/L    Chloride 96 96 - 108 mmol/L    CO2 37 (H) 21 - 32 mmol/L    ANION GAP 6 4 - 13 mmol/L    BUN 70 (H) 5 - 25 mg/dL    Creatinine 1 52 (H) 0 60 - 1 30 mg/dL    Glucose 124 65 - 140 mg/dL    Calcium 8 6 8 4 - 10 2 mg/dL    eGFR 34 ml/min/1 73sq m   Fingerstick Glucose (POCT)    Collection Time: 06/07/22  7:22 AM   Result Value Ref Range    POC Glucose 128 65 - 140 mg/dl   Fingerstick Glucose (POCT)    Collection Time: 06/07/22 11:08 AM   Result Value Ref Range    POC Glucose 159 (H) 65 - 140 mg/dl   Fingerstick Glucose (POCT)    Collection Time: 06/07/22  3:21 PM   Result Value Ref Range    POC Glucose 165 (H) 65 - 140 mg/dl   Fingerstick Glucose (POCT)    Collection Time: 06/07/22  8:56 PM   Result Value Ref Range    POC Glucose 240 (H) 65 - 140 mg/dl   Basic metabolic panel    Collection Time: 06/08/22  5:22 AM   Result Value Ref Range    Sodium 137 135 - 147 mmol/L    Potassium 3 9 3 5 - 5 3 mmol/L    Chloride 95 (L) 96 - 108 mmol/L    CO2 35 (H) 21 - 32 mmol/L    ANION GAP 7 4 - 13 mmol/L    BUN 82 (H) 5 - 25 mg/dL    Creatinine 1 71 (H) 0 60 - 1 30 mg/dL    Glucose 190 (H) 65 - 140 mg/dL    Calcium 8 7 8 4 - 10 2 mg/dL    eGFR 30 ml/min/1 73sq m   Fingerstick Glucose (POCT)    Collection Time: 06/08/22  8:05 AM   Result Value Ref Range    POC Glucose 170 (H) 65 - 140 mg/dl     Imaging: I have personally reviewed pertinent reports  Counseling / Coordination of Care  Total time spent today 20 minutes  Greater than 50% of total time was spent with the patient and / or family counseling and / or coordination of care

## 2022-06-08 NOTE — PLAN OF CARE
Problem: Potential for Falls  Goal: Patient will remain free of falls  Description: INTERVENTIONS:  - Educate patient/family on patient safety including physical limitations  - Instruct patient to call for assistance with activity   - Consult OT/PT to assist with strengthening/mobility   - Keep Call bell within reach  - Keep bed low and locked with side rails adjusted as appropriate  - Keep care items and personal belongings within reach  - Initiate and maintain comfort rounds  - Make Fall Risk Sign visible to staff  - Offer Toileting every 2 Hours, in advance of need  - Initiate/Maintain bed alarm  - Obtain necessary fall risk management equipment: bed alarm  - Apply yellow socks and bracelet for high fall risk patients  - Consider moving patient to room near nurses station  Outcome: Progressing     Problem: MOBILITY - ADULT  Goal: Maintain or return to baseline ADL function  Description: INTERVENTIONS:  -  Assess patient's ability to carry out ADLs; assess patient's baseline for ADL function and identify physical deficits which impact ability to perform ADLs (bathing, care of mouth/teeth, toileting, grooming, dressing, etc )  - Assess/evaluate cause of self-care deficits   - Assess range of motion  - Assess patient's mobility; develop plan if impaired  - Assess patient's need for assistive devices and provide as appropriate  - Encourage maximum independence but intervene and supervise when necessary  - Involve family in performance of ADLs  - Assess for home care needs following discharge   - Consider OT consult to assist with ADL evaluation and planning for discharge  - Provide patient education as appropriate  Outcome: Progressing  Goal: Maintains/Returns to pre admission functional level  Description: INTERVENTIONS:  - Perform BMAT or MOVE assessment daily    - Set and communicate daily mobility goal to care team and patient/family/caregiver     - Collaborate with rehabilitation services on mobility goals if consulted  - Perform Range of Motion 2 times a day  - Reposition patient every 2 hours    - Dangle patient 2 times a day  - Stand patient 2 times a day  - Ambulate patient 3 times a day  - Out of bed to chair 3 times a day   - Out of bed for meals 3 times a day  - Out of bed for toileting  - Record patient progress and toleration of activity level   Outcome: Progressing     Problem: Prexisting or High Potential for Compromised Skin Integrity  Goal: Skin integrity is maintained or improved  Description: INTERVENTIONS:  - Identify patients at risk for skin breakdown  - Assess and monitor skin integrity  - Assess and monitor nutrition and hydration status  - Monitor labs   - Assess for incontinence   - Turn and reposition patient  - Assist with mobility/ambulation  - Relieve pressure over bony prominences  - Avoid friction and shearing  - Provide appropriate hygiene as needed including keeping skin clean and dry  - Evaluate need for skin moisturizer/barrier cream  - Collaborate with interdisciplinary team   - Patient/family teaching  - Consider wound care consult   Outcome: Progressing     Problem: CARDIOVASCULAR - ADULT  Goal: Maintains optimal cardiac output and hemodynamic stability  Description: INTERVENTIONS:  - Monitor I/O, vital signs and rhythm  - Monitor for S/S and trends of decreased cardiac output  - Administer and titrate ordered vasoactive medications to optimize hemodynamic stability  - Assess quality of pulses, skin color and temperature  - Assess for signs of decreased coronary artery perfusion  - Instruct patient to report change in severity of symptoms  Outcome: Progressing  Goal: Absence of cardiac dysrhythmias or at baseline rhythm  Description: INTERVENTIONS:  - Continuous cardiac monitoring, vital signs, obtain 12 lead EKG if ordered  - Administer antiarrhythmic and heart rate control medications as ordered  - Monitor electrolytes and administer replacement therapy as ordered  Outcome: Progressing     Problem: Nutrition/Hydration-ADULT  Goal: Nutrient/Hydration intake appropriate for improving, restoring or maintaining nutritional needs  Description: Monitor and assess patient's nutrition/hydration status for malnutrition  Collaborate with interdisciplinary team and initiate plan and interventions as ordered  Monitor patient's weight and dietary intake as ordered or per policy  Utilize nutrition screening tool and intervene as necessary  Determine patient's food preferences and provide high-protein, high-caloric foods as appropriate       INTERVENTIONS:  - Monitor oral intake, urinary output, labs, and treatment plans  - Assess nutrition and hydration status and recommend course of action  - Evaluate amount of meals eaten  - Assist patient with eating if necessary   - Allow adequate time for meals  - Recommend/ encourage appropriate diets, oral nutritional supplements, and vitamin/mineral supplements  - Order, calculate, and assess calorie counts as needed  - Recommend, monitor, and adjust tube feedings and TPN/PPN based on assessed needs  - Assess need for intravenous fluids  - Provide specific nutrition/hydration education as appropriate  - Include patient/family/caregiver in decisions related to nutrition  Outcome: Progressing

## 2022-06-08 NOTE — CASE MANAGEMENT
Case Management Progress Note    Patient name Evangelina Donaldson  Location S /S -11 MRN [id number]  : 1950 Date 2022       LOS (days): 4  Geometric Mean LOS (GMLOS) (days): 3 80  Days to GMLOS:0 1        OBJECTIVE:        Current admission status: Inpatient  Preferred Pharmacy:   [address] ([address], [address] [facility] ([address] [address])  [address] [address] [place] [address] [phone])  [place] [address]  Phone: [phone] Fax: [phone]    Primary Care Provider:  Leopoldo Burlington, MD    Primary Insurance: MEDICARE  Secondary Insurance: [employer]    PROGRESS NOTE:      Weekly Care Management Length of Stay Review     Current LOS: 4    Most Recent Labs:     Lab Results   Component Value Date/Time    SODIUM 137 2022 05:22 AM    K 3 9 2022 05:22 AM    CL 95 (L) 2022 05:22 AM    CO2 35 (H) 2022 05:22 AM    BUN 82 (H) 2022 05:22 AM    CREATININE 1 71 (H) 2022 05:22 AM    GLUC 190 (H) 2022 05:22 AM       Most Recent Vitals:   Vitals:    22 0744   BP: 128/53   Pulse: 72   Resp: 18   Temp: 98 6 °F (37 °C)   SpO2: 96%        Brief Care Summary & Need for Continued Stay[de-identified] CHF, acute O2, on RA now    Intended Discharge Disposition: Home with [facility]    Expected Discharge Date: Today    Current Identified Barriers to Discharge & Delays[de-identified]      D/C Goals/ CM Interventions[de-identified]

## 2022-06-08 NOTE — PLAN OF CARE
Problem: MOBILITY - ADULT  Goal: Maintain or return to baseline ADL function  Description: INTERVENTIONS:  -  Assess patient's ability to carry out ADLs; assess patient's baseline for ADL function and identify physical deficits which impact ability to perform ADLs (bathing, care of mouth/teeth, toileting, grooming, dressing, etc )  - Assess/evaluate cause of self-care deficits   - Assess range of motion  - Assess patient's mobility; develop plan if impaired  - Assess patient's need for assistive devices and provide as appropriate  - Encourage maximum independence but intervene and supervise when necessary  - Involve family in performance of ADLs  - Assess for home care needs following discharge   - Consider OT consult to assist with ADL evaluation and planning for discharge  - Provide patient education as appropriate  Outcome: Progressing  Goal: Maintains/Returns to pre admission functional level  Description: INTERVENTIONS:  - Perform BMAT or MOVE assessment daily    - Set and communicate daily mobility goal to care team and patient/family/caregiver  - Collaborate with rehabilitation services on mobility goals if consulted  - Perform Range of Motion 3 times a day  - Reposition patient every 2 hours    - Dangle patient 3 times a day  - Stand patient 3 times a day  - Ambulate patient 3 times a day  - Out of bed to chair 3 times a day   - Out of bed for meals 3 times a day  - Out of bed for toileting  - Record patient progress and toleration of activity level   Outcome: Progressing     Problem: Prexisting or High Potential for Compromised Skin Integrity  Goal: Skin integrity is maintained or improved  Description: INTERVENTIONS:  - Identify patients at risk for skin breakdown  - Assess and monitor skin integrity  - Assess and monitor nutrition and hydration status  - Monitor labs   - Assess for incontinence   - Turn and reposition patient  - Assist with mobility/ambulation  - Relieve pressure over bony prominences  - Avoid friction and shearing  - Provide appropriate hygiene as needed including keeping skin clean and dry  - Evaluate need for skin moisturizer/barrier cream  - Collaborate with interdisciplinary team   - Patient/family teaching  - Consider wound care consult   Outcome: Progressing     Problem: CARDIOVASCULAR - ADULT  Goal: Maintains optimal cardiac output and hemodynamic stability  Description: INTERVENTIONS:  - Monitor I/O, vital signs and rhythm  - Monitor for S/S and trends of decreased cardiac output  - Administer and titrate ordered vasoactive medications to optimize hemodynamic stability  - Assess quality of pulses, skin color and temperature  - Assess for signs of decreased coronary artery perfusion  - Instruct patient to report change in severity of symptoms  Outcome: Progressing  Goal: Absence of cardiac dysrhythmias or at baseline rhythm  Description: INTERVENTIONS:  - Continuous cardiac monitoring, vital signs, obtain 12 lead EKG if ordered  - Administer antiarrhythmic and heart rate control medications as ordered  - Monitor electrolytes and administer replacement therapy as ordered  Outcome: Progressing

## 2022-06-08 NOTE — ASSESSMENT & PLAN NOTE
Wt Readings from Last 3 Encounters:   06/08/22 113 kg (248 lb 6 4 oz)   05/31/22 116 kg (255 lb)   05/26/22 114 kg (252 lb)     · POA with increased weight gain, SOB and worsening LE edema   · NT pro BNP 5/31: 1859, BNP 6/4-74  · EKG in ED:  Rate 70s, NSR  · Compliant with diuretics; no dietary indiscretion  Has noticed her urinary output decrease over the past 1 year  At recent ED visit, Torsemide inc from 20 mg to 60 mg daily  Took for 5 days prior to presentation  · Dry Wt = 244  · CXR:  No significant vascular congestion noted on my read  · Echo 3/22: LVEF 53%  · Patient is back to baseline O2 requirements    Plan:  · IV Bumex 2 mg (got one dose on 6/8 due to elevated Cr); switched to 60 mg Torsemide QD-6/9/22  · I/Os, daily weights  · Oxygen as needed to maintain saturations above 89%; no longer requires oxygen during daytime    · Low salt diet, FR 1800   · Cardiology team actively following

## 2022-06-08 NOTE — ASSESSMENT & PLAN NOTE
Lab Results   Component Value Date    EGFR 35 06/09/2022    EGFR 30 06/08/2022    EGFR 34 06/07/2022    CREATININE 1 50 (H) 06/09/2022    CREATININE 1 71 (H) 06/08/2022    CREATININE 1 52 (H) 06/07/2022     · Likely in setting of aggressive diuresis; baseline Cr on 6/9AM  · Baseline 1 4-1 6  · Monitor BMP

## 2022-06-08 NOTE — ASSESSMENT & PLAN NOTE
Lab Results   Component Value Date    HGBA1C 6 8 (H) 03/09/2022     Recent Labs     06/08/22  1041 06/08/22  1530 06/08/22  2116 06/09/22  0727   POCGLU 248* 240* 203* 190*     Blood Sugar Average: Last 72 hrs:  (P) 203 [id number]   Home Tujeo solostar 25 U HS     Plan:  · lantus 25units at night  · Added 4units at mealtime on 6/6   · ISS and accuchecks   · Hypoglycemia protocol   · Diabetic diet   · Upon discharge, follow-up with PCP for better diabetic control

## 2022-06-09 LAB
ANION GAP SERPL CALCULATED.3IONS-SCNC: 8 MMOL/L (ref 4–13)
BUN SERPL-MCNC: 82 MG/DL (ref 5–25)
CALCIUM SERPL-MCNC: 8.8 MG/DL (ref 8.4–10.2)
CHLORIDE SERPL-SCNC: 94 MMOL/L (ref 96–108)
CO2 SERPL-SCNC: 35 MMOL/L (ref 21–32)
CREAT SERPL-MCNC: 1.5 MG/DL (ref 0.6–1.3)
GFR SERPL CREATININE-BSD FRML MDRD: 35 ML/MIN/1.73SQ M
GLUCOSE SERPL-MCNC: 188 MG/DL (ref 65–140)
GLUCOSE SERPL-MCNC: 190 MG/DL (ref 65–140)
GLUCOSE SERPL-MCNC: 220 MG/DL (ref 65–140)
GLUCOSE SERPL-MCNC: 249 MG/DL (ref 65–140)
GLUCOSE SERPL-MCNC: 257 MG/DL (ref 65–140)
GLUCOSE SERPL-MCNC: 288 MG/DL (ref 65–140)
POTASSIUM SERPL-SCNC: 4.2 MMOL/L (ref 3.5–5.3)
SODIUM SERPL-SCNC: 137 MMOL/L (ref 135–147)

## 2022-06-09 PROCEDURE — 80048 BASIC METABOLIC PNL TOTAL CA: CPT

## 2022-06-09 PROCEDURE — 82948 REAGENT STRIP/BLOOD GLUCOSE: CPT

## 2022-06-09 PROCEDURE — 99232 SBSQ HOSP IP/OBS MODERATE 35: CPT | Performed by: INTERNAL MEDICINE

## 2022-06-09 RX ADMIN — TORSEMIDE 40 MG: 20 TABLET ORAL at 09:08

## 2022-06-09 RX ADMIN — TORSEMIDE 20 MG: 20 TABLET ORAL at 17:06

## 2022-06-09 RX ADMIN — INSULIN LISPRO 3 UNITS: 100 INJECTION, SOLUTION INTRAVENOUS; SUBCUTANEOUS at 21:44

## 2022-06-09 RX ADMIN — INSULIN GLARGINE 25 UNITS: 100 INJECTION, SOLUTION SUBCUTANEOUS at 21:44

## 2022-06-09 RX ADMIN — PRAVASTATIN SODIUM 80 MG: 80 TABLET ORAL at 17:06

## 2022-06-09 RX ADMIN — INSULIN LISPRO 2 UNITS: 100 INJECTION, SOLUTION INTRAVENOUS; SUBCUTANEOUS at 17:51

## 2022-06-09 RX ADMIN — INSULIN LISPRO 4 UNITS: 100 INJECTION, SOLUTION INTRAVENOUS; SUBCUTANEOUS at 17:52

## 2022-06-09 RX ADMIN — INSULIN LISPRO 2 UNITS: 100 INJECTION, SOLUTION INTRAVENOUS; SUBCUTANEOUS at 09:07

## 2022-06-09 RX ADMIN — INSULIN LISPRO 4 UNITS: 100 INJECTION, SOLUTION INTRAVENOUS; SUBCUTANEOUS at 12:07

## 2022-06-09 RX ADMIN — PREGABALIN 100 MG: 100 CAPSULE ORAL at 09:08

## 2022-06-09 RX ADMIN — PREGABALIN 100 MG: 100 CAPSULE ORAL at 17:06

## 2022-06-09 RX ADMIN — MONTELUKAST 10 MG: 10 TABLET, FILM COATED ORAL at 09:08

## 2022-06-09 RX ADMIN — APIXABAN 5 MG: 5 TABLET, FILM COATED ORAL at 17:06

## 2022-06-09 RX ADMIN — INSULIN LISPRO 4 UNITS: 100 INJECTION, SOLUTION INTRAVENOUS; SUBCUTANEOUS at 09:07

## 2022-06-09 RX ADMIN — METOPROLOL TARTRATE 50 MG: 50 TABLET, FILM COATED ORAL at 09:08

## 2022-06-09 RX ADMIN — APIXABAN 5 MG: 5 TABLET, FILM COATED ORAL at 09:08

## 2022-06-09 NOTE — PLAN OF CARE
Problem: Potential for Falls  Goal: Patient will remain free of falls  Description: INTERVENTIONS:  - Educate patient/family on patient safety including physical limitations  - Instruct patient to call for assistance with activity   - Consult OT/PT to assist with strengthening/mobility   - Keep Call bell within reach  - Keep bed low and locked with side rails adjusted as appropriate  - Keep care items and personal belongings within reach  - Initiate and maintain comfort rounds  - Make Fall Risk Sign visible to staff  - Offer Toileting every 2 Hours, in advance of need  - Apply yellow socks and bracelet for high fall risk patients  - Consider moving patient to room near nurses station  Outcome: Progressing     Problem: MOBILITY - ADULT  Goal: Maintain or return to baseline ADL function  Description: INTERVENTIONS:  -  Assess patient's ability to carry out ADLs; assess patient's baseline for ADL function and identify physical deficits which impact ability to perform ADLs (bathing, care of mouth/teeth, toileting, grooming, dressing, etc )  - Assess/evaluate cause of self-care deficits   - Assess range of motion  - Assess patient's mobility; develop plan if impaired  - Assess patient's need for assistive devices and provide as appropriate  - Encourage maximum independence but intervene and supervise when necessary  - Involve family in performance of ADLs  - Assess for home care needs following discharge   - Consider OT consult to assist with ADL evaluation and planning for discharge  - Provide patient education as appropriate  Outcome: Progressing  Goal: Maintains/Returns to pre admission functional level  Description: INTERVENTIONS:  - Perform BMAT or MOVE assessment daily    - Set and communicate daily mobility goal to care team and patient/family/caregiver  - Collaborate with rehabilitation services on mobility goals if consulted  - Perform Range of Motion 3 times a day  - Reposition patient every 2 hours    - Dangle patient 3 times a day  - Stand patient 3 times a day  - Ambulate patient 3 times a day  - Out of bed to chair 3 times a day   - Out of bed for meals 3 times a day  - Out of bed for toileting  - Record patient progress and toleration of activity level   Outcome: Progressing     Problem: Prexisting or High Potential for Compromised Skin Integrity  Goal: Skin integrity is maintained or improved  Description: INTERVENTIONS:  - Identify patients at risk for skin breakdown  - Assess and monitor skin integrity  - Assess and monitor nutrition and hydration status  - Monitor labs   - Assess for incontinence   - Turn and reposition patient  - Assist with mobility/ambulation  - Relieve pressure over bony prominences  - Avoid friction and shearing  - Provide appropriate hygiene as needed including keeping skin clean and dry  - Evaluate need for skin moisturizer/barrier cream  - Collaborate with interdisciplinary team   - Patient/family teaching  - Consider wound care consult   Outcome: Progressing     Problem: CARDIOVASCULAR - ADULT  Goal: Maintains optimal cardiac output and hemodynamic stability  Description: INTERVENTIONS:  - Monitor I/O, vital signs and rhythm  - Monitor for S/S and trends of decreased cardiac output  - Administer and titrate ordered vasoactive medications to optimize hemodynamic stability  - Assess quality of pulses, skin color and temperature  - Assess for signs of decreased coronary artery perfusion  - Instruct patient to report change in severity of symptoms  Outcome: Progressing  Goal: Absence of cardiac dysrhythmias or at baseline rhythm  Description: INTERVENTIONS:  - Continuous cardiac monitoring, vital signs, obtain 12 lead EKG if ordered  - Administer antiarrhythmic and heart rate control medications as ordered  - Monitor electrolytes and administer replacement therapy as ordered  Outcome: Progressing     Problem: Nutrition/Hydration-ADULT  Goal: Nutrient/Hydration intake appropriate for improving, restoring or maintaining nutritional needs  Description: Monitor and assess patient's nutrition/hydration status for malnutrition  Collaborate with interdisciplinary team and initiate plan and interventions as ordered  Monitor patient's weight and dietary intake as ordered or per policy  Utilize nutrition screening tool and intervene as necessary  Determine patient's food preferences and provide high-protein, high-caloric foods as appropriate       INTERVENTIONS:  - Monitor oral intake, urinary output, labs, and treatment plans  - Assess nutrition and hydration status and recommend course of action  - Evaluate amount of meals eaten  - Assist patient with eating if necessary   - Allow adequate time for meals  - Recommend/ encourage appropriate diets, oral nutritional supplements, and vitamin/mineral supplements  - Order, calculate, and assess calorie counts as needed  - Recommend, monitor, and adjust tube feedings and TPN/PPN based on assessed needs  - Assess need for intravenous fluids  - Provide specific nutrition/hydration education as appropriate  - Include patient/family/caregiver in decisions related to nutrition  Outcome: Progressing

## 2022-06-09 NOTE — PROGRESS NOTES
Progress Note - Cardiology Team 1  Kaylin Patel 73 y o  female MRN: [id number]  Unit/Bed#: S -01 Encounter: [id number]        Principal Problem:    Acute on chronic diastolic CHF (congestive heart failure) (HCC)  Active Problems:    Paroxysmal atrial fibrillation (HCC)    Diabetes mellitus, type 2 (HCC)    Acute respiratory failure with hypoxia (HCC)    KATRINA (obstructive sleep apnea)    Elevated serum creatinine        Assessment/Plan     1   Acute on chronic diastolic heart failure  IV Lasix 60 mg b i d  with one time dose metolazone  Inadequate diuresis  6/7 Changed to bumex 2mg BID  Net negative 1700  Weight 247 lbs   BNP 74  ProBNP 5/31/2022- 1859  TTE 3/2022- LVEF 53%  CHF teaching  [facility] 5/16 to 5/23- AFib with RVR and acute on chronic diastolic heart failure   Discharge weight 247 lb   Lopressor 50 mg every 12 hours   Torsemide 20 mg daily       ED visit 05/31-torsemide increased to 60 mg  Reports 10 lb weight gain in 2 weeks     6/9 transitioned to oral diuretics- 40mg demadex AM, 20mg PM  If acceptable output and renal function  d/c tomorrow     2  PAF- not currently on telemetry  HR documented controlled  Presented in sinus rhythm  AC-Eliquis 5 mg b i d  Lopressor 50 mg every 12 hours  Encouraged to be compliant with CPAP  Hospital 5/16-5/23- AFib with RVR and acute on chronic diastolic heart failure     3  Hypertension-controlled  Currently on Lopressor 50 mg twice a day     4   HLD-pravastatin 80 mg daily     5   CKD- BUN/creat escalating  Monitor with diuresis     6  Obstructive sleep apnea-trying to adjust to CPAP  Suspected to be triggering her atrial fibrillation        Subjective/Objective   Chief Complaint/Subjective  Patient without cp, sob  Hopeful for discharge tomorrow        Vitals: /68 (BP Location: Right arm)   Pulse 80   Temp 98 8 °F (37 1 °C) (Oral)   Resp 18   Ht 5' 2" (1 575 m)   Wt 112 kg (247 lb)   SpO2 96%   BMI 45 18 kg/m²     Vitals:    06/09/22 0500 06/09/22 0600   Weight: 112 kg (247 lb) 112 kg (247 lb)     Orthostatic Blood Pressures    Flowsheet Row Most Recent Value   Blood Pressure 146/68 filed at 06/09/2022 9931   Patient Position - Orthostatic VS Lying filed at 06/09/2022 0727            Intake/Output Summary (Last 24 hours) at 6/9/2022 1159  Last data filed at 6/9/2022 0901  Gross per 24 hour   Intake 600 ml   Output 2300 ml   Net -1700 ml       Invasive Devices  Report    Peripheral Intravenous Line  Duration           Peripheral IV 06/06/22 Right;Distal Wrist 2 days                Current Facility-Administered Medications   Medication Dose Route Frequency    acetaminophen (TYLENOL) tablet 975 mg  975 mg Oral Q8H PRN    albuterol (PROVENTIL HFA,VENTOLIN HFA) inhaler 2 puff  2 puff Inhalation Q6H PRN    apixaban (ELIQUIS) tablet 5 mg  5 mg Oral BID    insulin glargine (LANTUS) subcutaneous injection 25 Units 0 25 mL  25 Units Subcutaneous HS    insulin lispro (HumaLOG) 100 units/mL subcutaneous injection 1-6 Units  1-6 Units Subcutaneous TID AC    insulin lispro (HumaLOG) 100 units/mL subcutaneous injection 1-6 Units  1-6 Units Subcutaneous HS    insulin lispro (HumaLOG) 100 units/mL subcutaneous injection 4 Units  4 Units Subcutaneous TID With Meals    metoprolol tartrate (LOPRESSOR) tablet 50 mg  50 mg Oral Q12H SELMA    montelukast (SINGULAIR) tablet 10 mg  10 mg Oral Daily    pravastatin (PRAVACHOL) tablet 80 mg  80 mg Oral Daily With Dinner    pregabalin (LYRICA) capsule 100 mg  100 mg Oral BID    torsemide (DEMADEX) tablet 20 mg  20 mg Oral After Dinner    torsemide (DEMADEX) tablet 40 mg  40 mg Oral Daily Before Breakfast         Physical Exam: /68 (BP Location: Right arm)   Pulse 80   Temp 98 8 °F (37 1 °C) (Oral)   Resp 18   Ht 5' 2" (1 575 m)   Wt 112 kg (247 lb)   SpO2 96%   BMI 45 18 kg/m²     General Appearance:    Alert, cooperative, no distress, appears stated age   Head:    Normocephalic, no scleral icterus Eyes:    PERRL   Nose:   Nares normal, septum midline, no drainage    Throat:   Lips, mucosa, and tongue normal   Neck:   Supple, symmetrical, trachea midline,            Lungs:     Clear to auscultation bilaterally, respirations unlabored   Chest Wall:    No tenderness or deformity    Heart:    Regular rate and rhythm, S1 and S2 normal, no murmur, rub   or gallop       Extremities:   Extremities normal, atraumatic, no cyanosis ,mild edema   Pulses:   2+ and symmetric all extremities   Skin:   Skin color, texture, turgor normal, no rashes or lesions   Neurologic:   Alert and oriented to person place and time, no focal deficits                 Lab Results:   Recent Results (from the past 72 hour(s))   Fingerstick Glucose (POCT)    Collection Time: 06/06/22  3:49 PM   Result Value Ref Range    POC Glucose 231 (H) 65 - 140 mg/dl   Fingerstick Glucose (POCT)    Collection Time: 06/06/22  8:48 PM   Result Value Ref Range    POC Glucose 206 (H) 65 - 140 mg/dl   Basic metabolic panel    Collection Time: 06/07/22  5:20 AM   Result Value Ref Range    Sodium 139 135 - 147 mmol/L    Potassium 4 4 3 5 - 5 3 mmol/L    Chloride 96 96 - 108 mmol/L    CO2 37 (H) 21 - 32 mmol/L    ANION GAP 6 4 - 13 mmol/L    BUN 70 (H) 5 - 25 mg/dL    Creatinine 1 52 (H) 0 60 - 1 30 mg/dL    Glucose 124 65 - 140 mg/dL    Calcium 8 6 8 4 - 10 2 mg/dL    eGFR 34 ml/min/1 73sq m   Fingerstick Glucose (POCT)    Collection Time: 06/07/22  7:22 AM   Result Value Ref Range    POC Glucose 128 65 - 140 mg/dl   Fingerstick Glucose (POCT)    Collection Time: 06/07/22 11:08 AM   Result Value Ref Range    POC Glucose 159 (H) 65 - 140 mg/dl   Fingerstick Glucose (POCT)    Collection Time: 06/07/22  3:21 PM   Result Value Ref Range    POC Glucose 165 (H) 65 - 140 mg/dl   Fingerstick Glucose (POCT)    Collection Time: 06/07/22  8:56 PM   Result Value Ref Range    POC Glucose 240 (H) 65 - 140 mg/dl   Basic metabolic panel    Collection Time: 06/08/22  5:22 AM Result Value Ref Range    Sodium 137 135 - 147 mmol/L    Potassium 3 9 3 5 - 5 3 mmol/L    Chloride 95 (L) 96 - 108 mmol/L    CO2 35 (H) 21 - 32 mmol/L    ANION GAP 7 4 - 13 mmol/L    BUN 82 (H) 5 - 25 mg/dL    Creatinine 1 71 (H) 0 60 - 1 30 mg/dL    Glucose 190 (H) 65 - 140 mg/dL    Calcium 8 7 8 4 - 10 2 mg/dL    eGFR 30 ml/min/1 73sq m   Fingerstick Glucose (POCT)    Collection Time: 06/08/22  8:05 AM   Result Value Ref Range    POC Glucose 170 (H) 65 - 140 mg/dl   Fingerstick Glucose (POCT)    Collection Time: 06/08/22 10:41 AM   Result Value Ref Range    POC Glucose 248 (H) 65 - 140 mg/dl   Fingerstick Glucose (POCT)    Collection Time: 06/08/22  3:30 PM   Result Value Ref Range    POC Glucose 240 (H) 65 - 140 mg/dl   Fingerstick Glucose (POCT)    Collection Time: 06/08/22  9:16 PM   Result Value Ref Range    POC Glucose 203 (H) 65 - 140 mg/dl   Basic metabolic panel    Collection Time: 06/09/22  5:30 AM   Result Value Ref Range    Sodium 137 135 - 147 mmol/L    Potassium 4 2 3 5 - 5 3 mmol/L    Chloride 94 (L) 96 - 108 mmol/L    CO2 35 (H) 21 - 32 mmol/L    ANION GAP 8 4 - 13 mmol/L    BUN 82 (H) 5 - 25 mg/dL    Creatinine 1 50 (H) 0 60 - 1 30 mg/dL    Glucose 188 (H) 65 - 140 mg/dL    Calcium 8 8 8 4 - 10 2 mg/dL    eGFR 35 ml/min/1 73sq m   Fingerstick Glucose (POCT)    Collection Time: 06/09/22  7:27 AM   Result Value Ref Range    POC Glucose 190 (H) 65 - 140 mg/dl   Fingerstick Glucose (POCT)    Collection Time: 06/09/22 11:06 AM   Result Value Ref Range    POC Glucose 288 (H) 65 - 140 mg/dl     Imaging: I have personally reviewed pertinent reports  Counseling / Coordination of Care  Total time spent today 20 minutes  Greater than 50% of total time was spent with the patient and / or family counseling and / or coordination of care

## 2022-06-09 NOTE — PROGRESS NOTES
[facility]  Progress Note Clementina Turpin 1950, 73 y o  female MRN: [id number]  Unit/Bed#: S -01 Encounter: [id number]  Primary Care Provider: Shabnam Deluna MD   Date and time admitted to hospital: 6/4/2022  2:34 PM    * Acute on chronic diastolic CHF (congestive heart failure) ([address] )  Assessment & Plan  Wt Readings from Last 3 Encounters:   06/08/22 113 kg (248 lb 6 4 oz)   05/31/22 116 kg (255 lb)   05/26/22 114 kg (252 lb)     · POA with increased weight gain, SOB and worsening LE edema   · NT pro BNP 5/31: 1859, BNP 6/4-74  · EKG in ED:  Rate 70s, NSR  · Compliant with diuretics; no dietary indiscretion  Has noticed her urinary output decrease over the past 1 year  At recent ED visit, Torsemide inc from 20 mg to 60 mg daily  Took for 5 days prior to presentation  · Dry Wt = 244  · CXR:  No significant vascular congestion noted on my read  · Echo 3/22: LVEF 53%  · Patient is back to baseline O2 requirements    Plan:  · IV Bumex 2 mg (got one dose on 6/8 due to elevated Cr); switched to 60 mg Torsemide QD-6/9/22  · I/Os, daily weights  · Oxygen as needed to maintain saturations above 89%; no longer requires oxygen during daytime  · Low salt diet, FR 1800   · Cardiology team actively following     Elevated serum creatinine  Assessment & Plan  Lab Results   Component Value Date    EGFR 35 06/09/2022    EGFR 30 06/08/2022    EGFR 34 06/07/2022    CREATININE 1 50 (H) 06/09/2022    CREATININE 1 71 (H) 06/08/2022    CREATININE 1 52 (H) 06/07/2022     · Likely in setting of aggressive diuresis; baseline Cr on 6/9AM  · Baseline 1 4-1 6  · Monitor BMP    Acute respiratory failure with hypoxia (HCC)  Assessment & Plan  · Mildly hypoxic on admission; wears nocturnal O2 for KATRINA, 2L O2  · Able to saturate appropriately on 06/06 AM  without oxygen   However, patient desaturated into 80s upon exertion on 6/6 PM    · Back to baseline O2 requirements on 6/8 AM (noctural use only)  · Etiology:  Likely in setting of acute exacerbation of heart failure  Plan:  · Monitor resp status off of oxygen, especially upon exertion  · Wean with diuresis - maintain Sat >89%  · Albuterol PRN      Paroxysmal atrial fibrillation (HCC)  Assessment & Plan  · Rate controlled  Regular rhythm noted on exam     Plan:   · Cont metoprolol BID   · Cont apixaban 5 mg b i d  Diabetes mellitus, type 2 [facility])  Assessment & Plan  Lab Results   Component Value Date    HGBA1C 6 8 (H) 03/09/2022     Recent Labs     06/08/22  1041 06/08/22  1530 06/08/22  2116 06/09/22  0727   POCGLU 248* 240* 203* 190*     Blood Sugar Average: Last 72 hrs:  (P) 203 [id number]   Home Tujeo solostar 25 U HS     Plan:  · lantus 25units at night  · Added 4units at mealtime on 6/6   · ISS and accuchecks   · Hypoglycemia protocol   · Diabetic diet   · Upon discharge, follow-up with PCP for better diabetic control    KATRINA (obstructive sleep apnea)  Assessment & Plan  · On nocturnal O2   · Non compliant with BIPAP     Plan:   · Counseled pt extensively regarding BiPAP compliance  · Upon dc, f/u with outpatient sleep medicine team    VTE Pharmacologic Prophylaxis: VTE Score: 7 High Risk (Score >/= 5) - Pharmacological DVT Prophylaxis Ordered: apixaban (Eliquis)  Sequential Compression Devices Ordered  Patient Centered Rounds: I performed bedside rounds with nursing staff today  Discussions with Specialists or Other Care Team Provider:  Cardiology    Education and Discussions with Family / Patient: Updated  (niece) via phone  during morning rounds, [facility] team spoke with niece on the phone  Current Length of Stay: 5 day(s)  Current Patient Status: Inpatient   Discharge Plan: Anticipate discharge in 48 hrs to home  Code Status: Level 1 - Full Code    Subjective:   Patient seen and examined at bedside  No significant events overnight  Required oxygen only during the night (baseline)    Was able to walk in her room and use the bathroom without use of oxygen  She says she has been urinating more frequently since days prior  Denies chest pain, pnd, orthopnea, worsening SOB, abdominal pain, nausea vomiting, fever, chills, headache, dizziness or palpitations  Objective:     Vitals:   Temp (24hrs), Av °F (37 2 °C), Min:98 8 °F (37 1 °C), Max:99 3 °F (37 4 °C)    Temp:  [98 8 °F (37 1 °C)-99 3 °F (37 4 °C)] 98 8 °F (37 1 °C)  HR:  [71-80] 80  Resp:  [18] 18  BP: (131-146)/(60-68) 146/68  SpO2:  [95 %-97 %] 96 %  Body mass index is 45 18 kg/m²  Input and Output Summary (last 24 hours): Intake/Output Summary (Last 24 hours) at 2022 1058  Last data filed at 2022 0901  Gross per 24 hour   Intake 600 ml   Output 2300 ml   Net -1700 ml     Physical Exam   Vitals reviewed  General Examination: Sitting up in chair, cooperative   HEENT: Normocephalic, Atraumatic  Extraocular movements intact, PERRLA  CVS: S1, S2 noted  No JVD noted  Lungs:  Decreased breath sounds bilaterally  No longer on oxygen  Abdomen: Soft, normal bowel sounds  Non distended, non tender  Ext:  Mild b/l lower extremity edema noted (improved since yesterday)  Psych: Though Process - logical    Skin: No bleeding/bruising noted  Neuro: A, Ox3  Follows simple, 3 steps commands  Appropriate antigravity strength in all 4 extremities proximally, distally       Additional Data:     Labs:  Results from last 7 days   Lab Units 22  1547   WBC Thousand/uL 8 08   HEMOGLOBIN g/dL 11 0*   HEMATOCRIT % 35 8   PLATELETS Thousands/uL 181   NEUTROS PCT % 58   LYMPHS PCT % 25   MONOS PCT % 10   EOS PCT % 6     Results from last 7 days   Lab Units 22  0530 22  0345 22  1547   SODIUM mmol/L 137   < > 139   POTASSIUM mmol/L 4 2   < > 4 5   CHLORIDE mmol/L 94*   < > 98   CO2 mmol/L 35*   < > 32   BUN mg/dL 82*   < > 69*   CREATININE mg/dL 1 50*   < > 1 62*   ANION GAP mmol/L 8   < > 9   CALCIUM mg/dL 8 8   < > 8 8   ALBUMIN g/dL  --   -- 3  8   TOTAL BILIRUBIN mg/dL  --   --  0 37   ALK PHOS U/L  --   --  61   ALT U/L  --   --  12   AST U/L  --   --  15   GLUCOSE RANDOM mg/dL 188*   < > 196*    < > = values in this interval not displayed  Results from last 7 days   Lab Units 06/04/22  1547   INR  1 25*     Results from last 7 days   Lab Units 06/09/22  0727 06/08/22  2116 06/08/22  1530 06/08/22  1041 06/08/22  0805 06/07/22  2056 06/07/22  1521 06/07/22  1108 06/07/22  0722 06/06/22  2048 06/06/22  1549 06/06/22  1046   POC GLUCOSE mg/dl 190* 203* 240* 248* 170* 240* 165* 159* 128 206* 231* 300*             Lines/Drains:  Invasive Devices  Report    Peripheral Intravenous Line  Duration           Peripheral IV 06/06/22 Right;Distal Wrist 2 days              Imaging:  Reviewed pertinent reports from this admission including:  XR chest 2 views   ED Interpretation by Beatriz De Olvieira PA-C (06/04 1651)   Mild signs of vascular congestion        Final Result by Cipriano Avila MD (06/05 0896)      No acute cardiopulmonary disease                    Workstation performed: [id number]           Recent Cultures (last 7 days):         Last 24 Hours Medication List:   Current Facility-Administered Medications   Medication Dose Route Frequency Provider Last Rate    acetaminophen  975 mg Oral Q8H PRN Henok Langston MD      albuterol  2 puff Inhalation Q6H PRN Maciel Kay MD      apixaban  5 mg Oral BID Maciel Kay MD      insulin glargine  25 Units Subcutaneous HS Maciel Kay MD      insulin lispro  1-6 Units Subcutaneous TID [facility] Maciel Kay MD      insulin lispro  1-6 Units Subcutaneous HS Maciel Kay MD      insulin lispro  4 Units Subcutaneous TID With Meals Fiordaliza Orellana MD      metoprolol tartrate  50 mg Oral Q12H [facility] Maciel Kay MD      montelukast  10 mg Oral Daily Maciel Kay MD      pravastatin  80 mg Oral Daily With Felisa Nickerson MD      pregabalin  100 mg Oral BID Maciel Kay MD      torsemide  20 mg Oral After Dinner RK Davies      torsemide  40 mg Oral Daily Before Breakfast RK Davies          Today, Patient Was Seen By: Avis Lewis MD    **Please Note: This note may have been constructed using a voice recognition system  **

## 2022-06-10 PROBLEM — J96.01 ACUTE RESPIRATORY FAILURE WITH HYPOXIA (HCC): Status: RESOLVED | Noted: 2022-03-22 | Resolved: 2022-06-10

## 2022-06-10 LAB
ANION GAP SERPL CALCULATED.3IONS-SCNC: 8 MMOL/L (ref 4–13)
BUN SERPL-MCNC: 91 MG/DL (ref 5–25)
CALCIUM SERPL-MCNC: 8.4 MG/DL (ref 8.4–10.2)
CHLORIDE SERPL-SCNC: 94 MMOL/L (ref 96–108)
CO2 SERPL-SCNC: 35 MMOL/L (ref 21–32)
CREAT SERPL-MCNC: 2.06 MG/DL (ref 0.6–1.3)
GFR SERPL CREATININE-BSD FRML MDRD: 24 ML/MIN/1.73SQ M
GLUCOSE SERPL-MCNC: 128 MG/DL (ref 65–140)
GLUCOSE SERPL-MCNC: 161 MG/DL (ref 65–140)
GLUCOSE SERPL-MCNC: 260 MG/DL (ref 65–140)
GLUCOSE SERPL-MCNC: 267 MG/DL (ref 65–140)
GLUCOSE SERPL-MCNC: 277 MG/DL (ref 65–140)
POTASSIUM SERPL-SCNC: 4.2 MMOL/L (ref 3.5–5.3)
SODIUM SERPL-SCNC: 137 MMOL/L (ref 135–147)

## 2022-06-10 PROCEDURE — 87040 BLOOD CULTURE FOR BACTERIA: CPT

## 2022-06-10 PROCEDURE — 80048 BASIC METABOLIC PNL TOTAL CA: CPT

## 2022-06-10 PROCEDURE — 99232 SBSQ HOSP IP/OBS MODERATE 35: CPT | Performed by: INTERNAL MEDICINE

## 2022-06-10 PROCEDURE — 82948 REAGENT STRIP/BLOOD GLUCOSE: CPT

## 2022-06-10 PROCEDURE — 99233 SBSQ HOSP IP/OBS HIGH 50: CPT | Performed by: INTERNAL MEDICINE

## 2022-06-10 RX ORDER — CEFAZOLIN SODIUM 2 G/50ML
2000 SOLUTION INTRAVENOUS EVERY 8 HOURS
Status: DISCONTINUED | OUTPATIENT
Start: 2022-06-10 | End: 2022-06-11

## 2022-06-10 RX ORDER — INSULIN LISPRO 100 [IU]/ML
2-12 INJECTION, SOLUTION INTRAVENOUS; SUBCUTANEOUS EVERY 6 HOURS SCHEDULED
Status: DISCONTINUED | OUTPATIENT
Start: 2022-06-10 | End: 2022-06-10

## 2022-06-10 RX ORDER — INSULIN LISPRO 100 [IU]/ML
2-12 INJECTION, SOLUTION INTRAVENOUS; SUBCUTANEOUS
Status: DISCONTINUED | OUTPATIENT
Start: 2022-06-10 | End: 2022-06-12 | Stop reason: HOSPADM

## 2022-06-10 RX ADMIN — INSULIN GLARGINE 25 UNITS: 100 INJECTION, SOLUTION SUBCUTANEOUS at 22:30

## 2022-06-10 RX ADMIN — INSULIN LISPRO 2 UNITS: 100 INJECTION, SOLUTION INTRAVENOUS; SUBCUTANEOUS at 12:00

## 2022-06-10 RX ADMIN — INSULIN LISPRO 1 UNITS: 100 INJECTION, SOLUTION INTRAVENOUS; SUBCUTANEOUS at 08:26

## 2022-06-10 RX ADMIN — PREGABALIN 100 MG: 100 CAPSULE ORAL at 08:23

## 2022-06-10 RX ADMIN — PREGABALIN 100 MG: 100 CAPSULE ORAL at 17:02

## 2022-06-10 RX ADMIN — INSULIN LISPRO 4 UNITS: 100 INJECTION, SOLUTION INTRAVENOUS; SUBCUTANEOUS at 17:03

## 2022-06-10 RX ADMIN — INSULIN LISPRO 4 UNITS: 100 INJECTION, SOLUTION INTRAVENOUS; SUBCUTANEOUS at 08:26

## 2022-06-10 RX ADMIN — INSULIN LISPRO 4 UNITS: 100 INJECTION, SOLUTION INTRAVENOUS; SUBCUTANEOUS at 22:31

## 2022-06-10 RX ADMIN — APIXABAN 5 MG: 5 TABLET, FILM COATED ORAL at 17:02

## 2022-06-10 RX ADMIN — PRAVASTATIN SODIUM 80 MG: 80 TABLET ORAL at 17:02

## 2022-06-10 RX ADMIN — INSULIN LISPRO 4 UNITS: 100 INJECTION, SOLUTION INTRAVENOUS; SUBCUTANEOUS at 12:00

## 2022-06-10 RX ADMIN — METOPROLOL TARTRATE 50 MG: 50 TABLET, FILM COATED ORAL at 08:23

## 2022-06-10 RX ADMIN — MONTELUKAST 10 MG: 10 TABLET, FILM COATED ORAL at 08:23

## 2022-06-10 RX ADMIN — METOPROLOL TARTRATE 50 MG: 50 TABLET, FILM COATED ORAL at 22:30

## 2022-06-10 RX ADMIN — INSULIN LISPRO 6 UNITS: 100 INJECTION, SOLUTION INTRAVENOUS; SUBCUTANEOUS at 17:02

## 2022-06-10 RX ADMIN — APIXABAN 5 MG: 5 TABLET, FILM COATED ORAL at 08:23

## 2022-06-10 RX ADMIN — CEFAZOLIN SODIUM 2000 MG: 2 SOLUTION INTRAVENOUS at 22:30

## 2022-06-10 NOTE — PROGRESS NOTES
Progress Note - Cardiology Team 1  John Cavazos 73 y o  female MRN: [id number]  Unit/Bed#: S -01 Encounter: [id number]        Principal Problem:    Acute on chronic diastolic CHF (congestive heart failure) (HCC)  Active Problems:    Paroxysmal atrial fibrillation (HCC)    Diabetes mellitus, type 2 (HCC)    Acute respiratory failure with hypoxia (HCC)    KATRINA (obstructive sleep apnea)    Elevated serum creatinine      Assessment/Plan     1   Acute on chronic diastolic heart failure  IV Lasix 60 mg b i d  with one time dose metolazone  Inadequate diuresis  6/7 Changed to bumex 2mg BID with some improved diuresis    Hospital 5/16 to 5/23- AFib with RVR and acute on chronic diastolic heart failure   Discharge weight 247 lb   Lopressor 50 mg every 12 hours   Torsemide 20 mg daily       ED visit 05/31-torsemide increased to 60 mg x5 days  Reports 10 lb weight gain in 2 weeks     6/9 transitioned to oral diuretics- 40mg demadex AM, 20mg PM  6/10- weight unchanged  Net negative 740  SEBASTIAN   Reports additional 900 out today  Will hold diuretics today  Encouraged to keep legs elevated while OOB     2  PAF- not currently on telemetry  HR documented controlled  Presented in sinus rhythm  AC-Eliquis 5 mg b i d  Lopressor 50 mg every 12 hours  Encouraged to be compliant with CPAP  Hospital 5/16-5/23- AFib with RVR and acute on chronic diastolic heart failure     3  Hypertension-controlled  Currently on Lopressor 50 mg twice a day     4   HLD-pravastatin 80 mg daily     5   CKD/SEBASTIAN  Hold diuretic today     6  Obstructive sleep apnea-trying to adjust to CPAP  Suspected to be triggering her atrial fibrillation       Subjective/Objective   Chief Complaint/Subjective  Patient without cp, sob  Discussed renal function with patient        Vitals: /64 (BP Location: Left arm)   Pulse 72   Temp 97 9 °F (36 6 °C) (Oral)   Resp 18   Ht 5' 2" (1 575 m)   Wt 112 kg (247 lb 9 2 oz)   SpO2 95%   BMI 45 28 kg/m²     Vitals: 06/09/22 0600 06/10/22 0600   Weight: 112 kg (247 lb) 112 kg (247 lb 9 2 oz)     Orthostatic Blood Pressures    Flowsheet Row Most Recent Value   Blood Pressure 139/64 filed at 06/10/2022 0700   Patient Position - Orthostatic VS Sitting filed at 06/09/2022 2138            Intake/Output Summary (Last 24 hours) at 6/10/2022 1044  Last data filed at 6/10/2022 0827  Gross per 24 hour   Intake 560 ml   Output 1300 ml   Net -740 ml       Invasive Devices  Report    Peripheral Intravenous Line  Duration           Peripheral IV 06/06/22 Right;Distal Wrist 3 days                Current Facility-Administered Medications   Medication Dose Route Frequency    acetaminophen (TYLENOL) tablet 975 mg  975 mg Oral Q8H PRN    albuterol (PROVENTIL HFA,VENTOLIN HFA) inhaler 2 puff  2 puff Inhalation Q6H PRN    apixaban (ELIQUIS) tablet 5 mg  5 mg Oral BID    insulin glargine (LANTUS) subcutaneous injection 25 Units 0 25 mL  25 Units Subcutaneous HS    insulin lispro (HumaLOG) 100 units/mL subcutaneous injection 1-6 Units  1-6 Units Subcutaneous TID AC    insulin lispro (HumaLOG) 100 units/mL subcutaneous injection 1-6 Units  1-6 Units Subcutaneous HS    insulin lispro (HumaLOG) 100 units/mL subcutaneous injection 4 Units  4 Units Subcutaneous TID With Meals    metoprolol tartrate (LOPRESSOR) tablet 50 mg  50 mg Oral Q12H SELMA    montelukast (SINGULAIR) tablet 10 mg  10 mg Oral Daily    pravastatin (PRAVACHOL) tablet 80 mg  80 mg Oral Daily With Dinner    pregabalin (LYRICA) capsule 100 mg  100 mg Oral BID         Physical Exam: /64 (BP Location: Left arm)   Pulse 72   Temp 97 9 °F (36 6 °C) (Oral)   Resp 18   Ht 5' 2" (1 575 m)   Wt 112 kg (247 lb 9 2 oz)   SpO2 95%   BMI 45 28 kg/m²     General Appearance:    Alert, cooperative, no distress, appears stated age   Head:    Normocephalic, no scleral icterus   Eyes:    PERRL   Nose:   Nares normal, septum midline, no drainage    Throat:   Lips, mucosa, and tongue normal   Neck:   Supple, symmetrical, trachea midline,             Lungs:     Clear to auscultation bilaterally, respirations unlabored        Heart:    Regular rate and rhythm, S1 and S2 normal, no murmur, rub   or gallop       Extremities:   Extremities normal, atraumatic, no cyanosis 1+edema       Skin:   Skin warm   Neurologic:   Alert and oriented to person place and time, no focal deficits                 Lab Results:   Recent Results (from the past 72 hour(s))   Fingerstick Glucose (POCT)    Collection Time: 06/07/22 11:08 AM   Result Value Ref Range    POC Glucose 159 (H) 65 - 140 mg/dl   Fingerstick Glucose (POCT)    Collection Time: 06/07/22  3:21 PM   Result Value Ref Range    POC Glucose 165 (H) 65 - 140 mg/dl   Fingerstick Glucose (POCT)    Collection Time: 06/07/22  8:56 PM   Result Value Ref Range    POC Glucose 240 (H) 65 - 140 mg/dl   Basic metabolic panel    Collection Time: 06/08/22  5:22 AM   Result Value Ref Range    Sodium 137 135 - 147 mmol/L    Potassium 3 9 3 5 - 5 3 mmol/L    Chloride 95 (L) 96 - 108 mmol/L    CO2 35 (H) 21 - 32 mmol/L    ANION GAP 7 4 - 13 mmol/L    BUN 82 (H) 5 - 25 mg/dL    Creatinine 1 71 (H) 0 60 - 1 30 mg/dL    Glucose 190 (H) 65 - 140 mg/dL    Calcium 8 7 8 4 - 10 2 mg/dL    eGFR 30 ml/min/1 73sq m   Fingerstick Glucose (POCT)    Collection Time: 06/08/22  8:05 AM   Result Value Ref Range    POC Glucose 170 (H) 65 - 140 mg/dl   Fingerstick Glucose (POCT)    Collection Time: 06/08/22 10:41 AM   Result Value Ref Range    POC Glucose 248 (H) 65 - 140 mg/dl   Fingerstick Glucose (POCT)    Collection Time: 06/08/22  3:30 PM   Result Value Ref Range    POC Glucose 240 (H) 65 - 140 mg/dl   Fingerstick Glucose (POCT)    Collection Time: 06/08/22  9:16 PM   Result Value Ref Range    POC Glucose 203 (H) 65 - 140 mg/dl   Basic metabolic panel    Collection Time: 06/09/22  5:30 AM   Result Value Ref Range    Sodium 137 135 - 147 mmol/L    Potassium 4 2 3 5 - 5 3 mmol/L Chloride 94 (L) 96 - 108 mmol/L    CO2 35 (H) 21 - 32 mmol/L    ANION GAP 8 4 - 13 mmol/L    BUN 82 (H) 5 - 25 mg/dL    Creatinine 1 50 (H) 0 60 - 1 30 mg/dL    Glucose 188 (H) 65 - 140 mg/dL    Calcium 8 8 8 4 - 10 2 mg/dL    eGFR 35 ml/min/1 73sq m   Fingerstick Glucose (POCT)    Collection Time: 06/09/22  7:27 AM   Result Value Ref Range    POC Glucose 190 (H) 65 - 140 mg/dl   Fingerstick Glucose (POCT)    Collection Time: 06/09/22 11:06 AM   Result Value Ref Range    POC Glucose 288 (H) 65 - 140 mg/dl   Fingerstick Glucose (POCT)    Collection Time: 06/09/22  3:48 PM   Result Value Ref Range    POC Glucose 257 (H) 65 - 140 mg/dl   Fingerstick Glucose (POCT)    Collection Time: 06/09/22  5:50 PM   Result Value Ref Range    POC Glucose 220 (H) 65 - 140 mg/dl   Fingerstick Glucose (POCT)    Collection Time: 06/09/22  9:37 PM   Result Value Ref Range    POC Glucose 249 (H) 65 - 140 mg/dl   Basic metabolic panel    Collection Time: 06/10/22  5:59 AM   Result Value Ref Range    Sodium 137 135 - 147 mmol/L    Potassium 4 2 3 5 - 5 3 mmol/L    Chloride 94 (L) 96 - 108 mmol/L    CO2 35 (H) 21 - 32 mmol/L    ANION GAP 8 4 - 13 mmol/L    BUN 91 (H) 5 - 25 mg/dL    Creatinine 2 06 (H) 0 60 - 1 30 mg/dL    Glucose 128 65 - 140 mg/dL    Calcium 8 4 8 4 - 10 2 mg/dL    eGFR 24 ml/min/1 73sq m   Fingerstick Glucose (POCT)    Collection Time: 06/10/22  7:48 AM   Result Value Ref Range    POC Glucose 161 (H) 65 - 140 mg/dl     Imaging: I have personally reviewed pertinent reports  Counseling / Coordination of Care  Total time spent today 20 minutes  Greater than 50% of total time was spent with the patient and / or family counseling and / or coordination of care

## 2022-06-10 NOTE — ASSESSMENT & PLAN NOTE
Lab Results   Component Value Date    EGFR 24 06/10/2022    EGFR 35 06/09/2022    EGFR 30 06/08/2022    CREATININE 2 06 (H) 06/10/2022    CREATININE 1 50 (H) 06/09/2022    CREATININE 1 71 (H) 06/08/2022     · Likely in setting of aggressive diuresis; elevated Cr on 6/10AM  · Baseline 1 4-1 6  · Hold diuretics on 06/10, monitor BMP

## 2022-06-10 NOTE — PROGRESS NOTES
[facility]  Progress Note Eli Sanford 1950, 73 y o  female MRN: [id number]  Unit/Bed#: S -01 Encounter: [id number]  Primary Care Provider: Quique Giordano MD   Date and time admitted to hospital: 6/4/2022  2:34 PM    * Acute on chronic diastolic CHF (congestive heart failure) [facility])  Assessment & Plan  Wt Readings from Last 3 Encounters:   06/10/22 112 kg (247 lb 9 2 oz)   05/31/22 116 kg (255 lb)   05/26/22 114 kg (252 lb)     · POA with increased weight gain, SOB and worsening LE edema   · NT pro BNP 5/31: 1859, BNP 6/4-74  · EKG in ED:  Rate 70s, NSR  · Compliant with diuretics; no dietary indiscretion  Has noticed her urinary output decrease over the past 1 year  At recent ED visit, Torsemide inc from 20 mg to 60 mg daily  Took for 5 days prior to presentation  · Dry Wt = 244  · CXR:  No significant vascular congestion noted on my read  · Echo 3/22: LVEF 53%  · Patient is back to baseline O2 requirements  · Was given IV Bumex, switched to 60 mg torsemide on 06/09  Plan:  · No diuretics on 06/10 due to increasing creatinine; repeat BMP on 06/11 a m  · Tentatively start torsemide 40 mg on 06/11 a m   · I/Os, daily weights  · Oxygen as needed to maintain saturations above 89%; no longer requires oxygen during daytime    · Low salt diet, FR 1800   · Cardiology team actively following   · Upon discharge, outpatient Cardiology follow-up    Elevated serum creatinine  Assessment & Plan  Lab Results   Component Value Date    EGFR 24 06/10/2022    EGFR 35 06/09/2022    EGFR 30 06/08/2022    CREATININE 2 06 (H) 06/10/2022    CREATININE 1 50 (H) 06/09/2022    CREATININE 1 71 (H) 06/08/2022     · Likely in setting of aggressive diuresis; elevated Cr on 6/10AM  · Baseline 1 4-1 6  · Hold diuretics on 06/10, monitor BMP    Acute respiratory failure with hypoxia (HCC)-resolved as of 6/10/2022  Assessment & Plan  · Mildly hypoxic on admission; wears nocturnal O2 for KATRINA, 2L O2    · Able to saturate appropriately on 06/06 AM  without oxygen  However, patient desaturated into 80s upon exertion on 6/6 PM    · Back to baseline O2 requirements on 6/8 AM (noctural use only)  · Etiology:  Likely in setting of acute exacerbation of heart failure, now resolved  Plan:  · Monitor resp status off of oxygen, especially upon exertion  · Wean with diuresis - maintain Sat >89%  · Albuterol PRN      Paroxysmal atrial fibrillation (HCC)  Assessment & Plan  · Rate controlled  Regular rhythm noted on exam     Plan:   · Cont metoprolol BID   · Cont apixaban 5 mg b i d  Diabetes mellitus, type 2 [facility])  Assessment & Plan  Lab Results   Component Value Date    HGBA1C 6 8 (H) 03/09/2022     Recent Labs     06/09/22  1750 06/09/22  2137 06/10/22  0748 06/10/22  1110   POCGLU 220* 249* 161* 260*     Blood Sugar Average: Last 72 hrs:  (P) 199 [id number]   Home Tujeo solostar 25 U HS     Plan:  · lantus 25units at night  · Added 4units at mealtime on 6/6   · ISS- algo 4 and accuchecks   · Hypoglycemia protocol   · Diabetic diet   · Upon discharge, follow-up with PCP for better diabetic control    KATRINA (obstructive sleep apnea)  Assessment & Plan  · On nocturnal O2   · Non compliant with BIPAP     Plan:   · Counseled pt extensively regarding BiPAP compliance  · Upon dc, f/u with outpatient sleep medicine team    VTE Pharmacologic Prophylaxis: VTE Score: 7 High Risk (Score >/= 5) - Pharmacological DVT Prophylaxis Ordered: apixaban (Eliquis)  Sequential Compression Devices Ordered  Patient Centered Rounds: I performed bedside rounds with nursing staff today  Discussions with Specialists or Other Care Team Provider:  Cardiology    Education and Discussions with Family / Patient: Will update sister Claire Valentineole as per patient's wishes later this afternoon        Current Length of Stay: 6 day(s)  Current Patient Status: Inpatient   Discharge Plan: Anticipate discharge in 48 hrs to home      Code Status: Level 1 - Full Code    Subjective:   Patient seen and examined at bedside  No significant events overnight  Required oxygen only during the night (baseline)  She continues to be able to walk around her room without supplemental oxygen  Denies chest pain, pnd, orthopnea, worsening SOB, abdominal pain, nausea vomiting, fever, chills, headache, dizziness or palpitations  Objective:     Vitals:   Temp (24hrs), Av 4 °F (36 9 °C), Min:97 9 °F (36 6 °C), Max:98 7 °F (37 1 °C)    Temp:  [97 9 °F (36 6 °C)-98 7 °F (37 1 °C)] 97 9 °F (36 6 °C)  HR:  [72-83] 72  Resp:  [18-19] 18  BP: (103-139)/(52-64) 139/64  SpO2:  [90 %-95 %] 95 %  Body mass index is 45 28 kg/m²  Input and Output Summary (last 24 hours): Intake/Output Summary (Last 24 hours) at 6/10/2022 1335  Last data filed at 6/10/2022 1201  Gross per 24 hour   Intake 380 ml   Output 1250 ml   Net -870 ml     Physical Exam   Vitals reviewed  General Examination: Sitting up in chair, cooperative   HEENT: Normocephalic, Atraumatic  Extraocular movements intact, PERRLA  CVS: S1, S2 noted  No JVD noted  Lungs:  Decreased breath sounds bilaterally  Normal effort, no conversational dyspnea  Abdomen: Soft, normal bowel sounds  Non distended, non tender  Ext:  Mild b/l lower extremity edema noted  Psych: Though Process - logical    Skin: No bleeding/bruising noted  Neuro: A, Ox3  Follows simple, 3 steps commands  Appropriate antigravity strength in all 4 extremities proximally, distally       Additional Data:     Labs:  Results from last 7 days   Lab Units 22  1547   WBC Thousand/uL 8 08   HEMOGLOBIN g/dL 11 0*   HEMATOCRIT % 35 8   PLATELETS Thousands/uL 181   NEUTROS PCT % 58   LYMPHS PCT % 25   MONOS PCT % 10   EOS PCT % 6     Results from last 7 days   Lab Units 06/10/22  0559 22  0345 22  1547   SODIUM mmol/L 137   < > 139   POTASSIUM mmol/L 4 2   < > 4 5   CHLORIDE mmol/L 94*   < > 98   CO2 mmol/L 35*   < > 32   BUN mg/dL 91*   < > 69* CREATININE mg/dL 2 06*   < > 1 62*   ANION GAP mmol/L 8   < > 9   CALCIUM mg/dL 8 4   < > 8 8   ALBUMIN g/dL  --   --  3 8   TOTAL BILIRUBIN mg/dL  --   --  0 37   ALK PHOS U/L  --   --  61   ALT U/L  --   --  12   AST U/L  --   --  15   GLUCOSE RANDOM mg/dL 128   < > 196*    < > = values in this interval not displayed  Results from last 7 days   Lab Units 06/04/22  1547   INR  1 25*     Results from last 7 days   Lab Units 06/10/22  1110 06/10/22  0748 06/09/22  2137 06/09/22  1750 06/09/22  1548 06/09/22  1106 06/09/22  0727 06/08/22  2116 06/08/22  1530 06/08/22  1041 06/08/22  0805 06/07/22  2056   POC GLUCOSE mg/dl 260* 161* 249* 220* 257* 288* 190* 203* 240* 248* 170* 240*             Lines/Drains:  Invasive Devices  Report    None               Imaging:  Reviewed pertinent reports from this admission including:  XR chest 2 views   ED Interpretation by Bhaskar Alves PA-C (06/04 1651)   Mild signs of vascular congestion        Final Result by Amalia Terry MD (06/05 6443)      No acute cardiopulmonary disease                    Workstation performed: [id number]           Recent Cultures (last 7 days):         Last 24 Hours Medication List:   Current Facility-Administered Medications   Medication Dose Route Frequency Provider Last Rate    acetaminophen  975 mg Oral Q8H PRN Robert Leija MD      albuterol  2 puff Inhalation Q6H PRN Lucila Lin MD      apixaban  5 mg Oral BID Lucila Lin MD      insulin glargine  25 Units Subcutaneous HS Lucila Lin MD      insulin lispro  1-6 Units Subcutaneous HS Lucila Lin MD      insulin lispro  2-12 Units Subcutaneous Q6H [facility] Marley Lynch MD      insulin lispro  4 Units Subcutaneous TID With Meals Marley Lynch MD      metoprolol tartrate  50 mg Oral Q12H Chepe Salazar MD      montelukast  10 mg Oral Daily Lucila Lin MD      pravastatin  80 mg Oral Daily With Trinidad Murcia MD      pregabalin  100 mg Oral BID Josselin Renner Juice Concepcion MD          Today, Patient Was Seen By: Juhi Ordoñez MD    **Please Note: This note may have been constructed using a voice recognition system  **

## 2022-06-10 NOTE — PLAN OF CARE
Problem: Potential for Falls  Goal: Patient will remain free of falls  Description: INTERVENTIONS:  - Educate patient/family on patient safety including physical limitations  - Instruct patient to call for assistance with activity   - Consult OT/PT to assist with strengthening/mobility   - Keep Call bell within reach  - Keep bed low and locked with side rails adjusted as appropriate  - Keep care items and personal belongings within reach  - Initiate and maintain comfort rounds  - Make Fall Risk Sign visible to staff  - Offer Toileting every 2 Hours, in advance of need  - Initiate/Maintain bed/chair alarm  - Obtain necessary fall risk management equipment  - Apply yellow socks and bracelet for high fall risk patients  - Consider moving patient to room near nurses station  Outcome: Progressing     Problem: MOBILITY - ADULT  Goal: Maintain or return to baseline ADL function  Description: INTERVENTIONS:  -  Assess patient's ability to carry out ADLs; assess patient's baseline for ADL function and identify physical deficits which impact ability to perform ADLs (bathing, care of mouth/teeth, toileting, grooming, dressing, etc )  - Assess/evaluate cause of self-care deficits   - Assess range of motion  - Assess patient's mobility; develop plan if impaired  - Assess patient's need for assistive devices and provide as appropriate  - Encourage maximum independence but intervene and supervise when necessary  - Involve family in performance of ADLs  - Assess for home care needs following discharge   - Consider OT consult to assist with ADL evaluation and planning for discharge  - Provide patient education as appropriate  Outcome: Progressing  Goal: Maintains/Returns to pre admission functional level  Description: INTERVENTIONS:  - Perform BMAT or MOVE assessment daily    - Set and communicate daily mobility goal to care team and patient/family/caregiver     - Collaborate with rehabilitation services on mobility goals if consulted  - Perform Range of Motion  - Reposition patient   - Dangle patient   - Stand patient   - Ambulate patient   - Out of bed to chair   - Out of bed for meals   - Out of bed for toileting  - Record patient progress and toleration of activity level   Outcome: Progressing     Problem: Prexisting or High Potential for Compromised Skin Integrity  Goal: Skin integrity is maintained or improved  Description: INTERVENTIONS:  - Identify patients at risk for skin breakdown  - Assess and monitor skin integrity  - Assess and monitor nutrition and hydration status  - Monitor labs   - Assess for incontinence   - Turn and reposition patient  - Assist with mobility/ambulation  - Relieve pressure over bony prominences  - Avoid friction and shearing  - Provide appropriate hygiene as needed including keeping skin clean and dry  - Evaluate need for skin moisturizer/barrier cream  - Collaborate with interdisciplinary team   - Patient/family teaching  - Consider wound care consult   Outcome: Progressing     Problem: CARDIOVASCULAR - ADULT  Goal: Maintains optimal cardiac output and hemodynamic stability  Description: INTERVENTIONS:  - Monitor I/O, vital signs and rhythm  - Monitor for S/S and trends of decreased cardiac output  - Administer and titrate ordered vasoactive medications to optimize hemodynamic stability  - Assess quality of pulses, skin color and temperature  - Assess for signs of decreased coronary artery perfusion  - Instruct patient to report change in severity of symptoms  Outcome: Progressing  Goal: Absence of cardiac dysrhythmias or at baseline rhythm  Description: INTERVENTIONS:  - Continuous cardiac monitoring, vital signs, obtain 12 lead EKG if ordered  - Administer antiarrhythmic and heart rate control medications as ordered  - Monitor electrolytes and administer replacement therapy as ordered  Outcome: Progressing     Problem: Nutrition/Hydration-ADULT  Goal: Nutrient/Hydration intake appropriate for improving, restoring or maintaining nutritional needs  Description: Monitor and assess patient's nutrition/hydration status for malnutrition  Collaborate with interdisciplinary team and initiate plan and interventions as ordered  Monitor patient's weight and dietary intake as ordered or per policy  Utilize nutrition screening tool and intervene as necessary  Determine patient's food preferences and provide high-protein, high-caloric foods as appropriate       INTERVENTIONS:  - Monitor oral intake, urinary output, labs, and treatment plans  - Assess nutrition and hydration status and recommend course of action  - Evaluate amount of meals eaten  - Assist patient with eating if necessary   - Allow adequate time for meals  - Recommend/ encourage appropriate diets, oral nutritional supplements, and vitamin/mineral supplements  - Order, calculate, and assess calorie counts as needed  - Recommend, monitor, and adjust tube feedings and TPN/PPN based on assessed needs  - Assess need for intravenous fluids  - Provide specific nutrition/hydration education as appropriate  - Include patient/family/caregiver in decisions related to nutrition  Outcome: Progressing     Problem: RESPIRATORY - ADULT  Goal: Achieves optimal ventilation and oxygenation  Description: INTERVENTIONS:  - Assess for changes in respiratory status  - Assess for changes in mentation and behavior  - Position to facilitate oxygenation and minimize respiratory effort  - Oxygen administered by appropriate delivery if ordered  - Initiate smoking cessation education as indicated  - Encourage broncho-pulmonary hygiene including cough, deep breathe, Incentive Spirometry  - Assess the need for suctioning and aspirate as needed  - Assess and instruct to report SOB or any respiratory difficulty  - Respiratory Therapy support as indicated  Outcome: Progressing

## 2022-06-10 NOTE — ASSESSMENT & PLAN NOTE
· Mildly hypoxic on admission; wears nocturnal O2 for KATRINA, 2L O2  · Able to saturate appropriately on 06/06 AM  without oxygen  However, patient desaturated into 80s upon exertion on 6/6 PM    · Back to baseline O2 requirements on 6/8 AM (noctural use only)  · Etiology:  Likely in setting of acute exacerbation of heart failure, now resolved      Plan:  · Monitor resp status off of oxygen, especially upon exertion  · Wean with diuresis - maintain Sat >89%  · Albuterol PRN

## 2022-06-10 NOTE — ASSESSMENT & PLAN NOTE
Wt Readings from Last 3 Encounters:   06/10/22 112 kg (247 lb 9 2 oz)   05/31/22 116 kg (255 lb)   05/26/22 114 kg (252 lb)     · POA with increased weight gain, SOB and worsening LE edema   · NT pro BNP 5/31: 1859, BNP 6/4-74  · EKG in ED:  Rate 70s, NSR  · Compliant with diuretics; no dietary indiscretion  Has noticed her urinary output decrease over the past 1 year  At recent ED visit, Torsemide inc from 20 mg to 60 mg daily  Took for 5 days prior to presentation  · Dry Wt = 244  · CXR:  No significant vascular congestion noted on my read  · Echo 3/22: LVEF 53%  · Patient is back to baseline O2 requirements  · Was given IV Bumex, switched to 60 mg torsemide on 06/09  Plan:  · No diuretics on 06/10 due to increasing creatinine; repeat BMP on 06/11 a m  · Tentatively start torsemide 40 mg on 06/11 a m   · I/Os, daily weights  · Oxygen as needed to maintain saturations above 89%; no longer requires oxygen during daytime    · Low salt diet, FR 1800   · Cardiology team actively following   · Upon discharge, outpatient Cardiology follow-up

## 2022-06-10 NOTE — ASSESSMENT & PLAN NOTE
Lab Results   Component Value Date    HGBA1C 6 8 (H) 03/09/2022     Recent Labs     06/09/22  1750 06/09/22  2137 06/10/22  0748 06/10/22  1110   POCGLU 220* 249* 161* 260*     Blood Sugar Average: Last 72 hrs:  (P) 297 [id number]   Home Tujeo solostar 25 U HS     Plan:  · lantus 25units at night  · Added 4units at mealtime on 6/6   · ISS- algo 4 and accuchecks   · Hypoglycemia protocol   · Diabetic diet   · Upon discharge, follow-up with PCP for better diabetic control

## 2022-06-10 NOTE — QUICK NOTE
Patient was found to have IV infiltrated reported per the nursing staff  It is starting to spread on the right hand  The IV was out for hours ago per the nursing staff  The side was placed with the ice pad and heat pad  Patient seen and examined at bedside  She complains of 8/10 intensity sharp pain on the right forearm IV site  Denied fever, chills  On physical examination, IV site is  erythematous and indurated, warm to touch, tenderness present  Motor and sensory   of the right hand is normal  Patient is not receiving any IV meds currently  Consider cellulitis in the differential     Plans: Will take blood cultures before starting the antibiotic    will add IV antibiotics Ancef   monitor symptoms     follow-up a m  labs

## 2022-06-10 NOTE — PLAN OF CARE
Problem: Potential for Falls  Goal: Patient will remain free of falls  Description: INTERVENTIONS:  - Educate patient/family on patient safety including physical limitations  - Instruct patient to call for assistance with activity   - Consult OT/PT to assist with strengthening/mobility   - Keep Call bell within reach  - Keep bed low and locked with side rails adjusted as appropriate  - Keep care items and personal belongings within reach  - Initiate and maintain comfort rounds  - Make Fall Risk Sign visible to staff  - Offer Toileting every  Hours, in advance of need  - Initiate/Maintain alarm  - Obtain necessary fall risk management equipment:   - Apply yellow socks and bracelet for high fall risk patients  - Consider moving patient to room near nurses station  Outcome: Progressing     Problem: MOBILITY - ADULT  Goal: Maintain or return to baseline ADL function  Description: INTERVENTIONS:  -  Assess patient's ability to carry out ADLs; assess patient's baseline for ADL function and identify physical deficits which impact ability to perform ADLs (bathing, care of mouth/teeth, toileting, grooming, dressing, etc )  - Assess/evaluate cause of self-care deficits   - Assess range of motion  - Assess patient's mobility; develop plan if impaired  - Assess patient's need for assistive devices and provide as appropriate  - Encourage maximum independence but intervene and supervise when necessary  - Involve family in performance of ADLs  - Assess for home care needs following discharge   - Consider OT consult to assist with ADL evaluation and planning for discharge  - Provide patient education as appropriate  Outcome: Progressing  Goal: Maintains/Returns to pre admission functional level  Description: INTERVENTIONS:  - Perform BMAT or MOVE assessment daily    - Set and communicate daily mobility goal to care team and patient/family/caregiver     - Collaborate with rehabilitation services on mobility goals if consulted  - Perform Range of Motion  times a day  - Reposition patient every  hours    - Dangle patient  times a day  - Stand patient  times a day  - Ambulate patient  times a day  - Out of bed to chair  times a day   - Out of bed for meals  times a day  - Out of bed for toileting  - Record patient progress and toleration of activity level   Outcome: Progressing     Problem: Prexisting or High Potential for Compromised Skin Integrity  Goal: Skin integrity is maintained or improved  Description: INTERVENTIONS:  - Identify patients at risk for skin breakdown  - Assess and monitor skin integrity  - Assess and monitor nutrition and hydration status  - Monitor labs   - Assess for incontinence   - Turn and reposition patient  - Assist with mobility/ambulation  - Relieve pressure over bony prominences  - Avoid friction and shearing  - Provide appropriate hygiene as needed including keeping skin clean and dry  - Evaluate need for skin moisturizer/barrier cream  - Collaborate with interdisciplinary team   - Patient/family teaching  - Consider wound care consult   Outcome: Progressing     Problem: CARDIOVASCULAR - ADULT  Goal: Maintains optimal cardiac output and hemodynamic stability  Description: INTERVENTIONS:  - Monitor I/O, vital signs and rhythm  - Monitor for S/S and trends of decreased cardiac output  - Administer and titrate ordered vasoactive medications to optimize hemodynamic stability  - Assess quality of pulses, skin color and temperature  - Assess for signs of decreased coronary artery perfusion  - Instruct patient to report change in severity of symptoms  Outcome: Progressing  Goal: Absence of cardiac dysrhythmias or at baseline rhythm  Description: INTERVENTIONS:  - Continuous cardiac monitoring, vital signs, obtain 12 lead EKG if ordered  - Administer antiarrhythmic and heart rate control medications as ordered  - Monitor electrolytes and administer replacement therapy as ordered  Outcome: Progressing     Problem: Nutrition/Hydration-ADULT  Goal: Nutrient/Hydration intake appropriate for improving, restoring or maintaining nutritional needs  Description: Monitor and assess patient's nutrition/hydration status for malnutrition  Collaborate with interdisciplinary team and initiate plan and interventions as ordered  Monitor patient's weight and dietary intake as ordered or per policy  Utilize nutrition screening tool and intervene as necessary  Determine patient's food preferences and provide high-protein, high-caloric foods as appropriate       INTERVENTIONS:  - Monitor oral intake, urinary output, labs, and treatment plans  - Assess nutrition and hydration status and recommend course of action  - Evaluate amount of meals eaten  - Assist patient with eating if necessary   - Allow adequate time for meals  - Recommend/ encourage appropriate diets, oral nutritional supplements, and vitamin/mineral supplements  - Order, calculate, and assess calorie counts as needed  - Recommend, monitor, and adjust tube feedings and TPN/PPN based on assessed needs  - Assess need for intravenous fluids  - Provide specific nutrition/hydration education as appropriate  - Include patient/family/caregiver in decisions related to nutrition  Outcome: Progressing     Problem: RESPIRATORY - ADULT  Goal: Achieves optimal ventilation and oxygenation  Description: INTERVENTIONS:  - Assess for changes in respiratory status  - Assess for changes in mentation and behavior  - Position to facilitate oxygenation and minimize respiratory effort  - Oxygen administered by appropriate delivery if ordered  - Initiate smoking cessation education as indicated  - Encourage broncho-pulmonary hygiene including cough, deep breathe, Incentive Spirometry  - Assess the need for suctioning and aspirate as needed  - Assess and instruct to report SOB or any respiratory difficulty  - Respiratory Therapy support as indicated  Outcome: Progressing

## 2022-06-10 NOTE — QUICK NOTE
I spoke to this patient's sister, Ms Claire Moran, over the phone to provide a comprehensive clinical update  I answered all of her questions and addressed all of her concerns to the best of my ability and to her satisfaction

## 2022-06-11 PROBLEM — I80.8 THROMBOPHLEBITIS OF ARM, RIGHT: Status: ACTIVE | Noted: 2022-06-11

## 2022-06-11 LAB
ANION GAP SERPL CALCULATED.3IONS-SCNC: 7 MMOL/L (ref 4–13)
BASOPHILS # BLD AUTO: 0.05 THOUSANDS/ΜL (ref 0–0.1)
BASOPHILS NFR BLD AUTO: 1 % (ref 0–1)
BUN SERPL-MCNC: 98 MG/DL (ref 5–25)
CALCIUM SERPL-MCNC: 8.4 MG/DL (ref 8.4–10.2)
CHLORIDE SERPL-SCNC: 93 MMOL/L (ref 96–108)
CO2 SERPL-SCNC: 36 MMOL/L (ref 21–32)
CREAT SERPL-MCNC: 1.94 MG/DL (ref 0.6–1.3)
EOSINOPHIL # BLD AUTO: 0.45 THOUSAND/ΜL (ref 0–0.61)
EOSINOPHIL NFR BLD AUTO: 6 % (ref 0–6)
ERYTHROCYTE [DISTWIDTH] IN BLOOD BY AUTOMATED COUNT: 15.1 % (ref 11.6–15.1)
GFR SERPL CREATININE-BSD FRML MDRD: 25 ML/MIN/1.73SQ M
GLUCOSE SERPL-MCNC: 154 MG/DL (ref 65–140)
GLUCOSE SERPL-MCNC: 166 MG/DL (ref 65–140)
GLUCOSE SERPL-MCNC: 180 MG/DL (ref 65–140)
GLUCOSE SERPL-MCNC: 235 MG/DL (ref 65–140)
GLUCOSE SERPL-MCNC: 314 MG/DL (ref 65–140)
HCT VFR BLD AUTO: 34 % (ref 34.8–46.1)
HGB BLD-MCNC: 10.2 G/DL (ref 11.5–15.4)
IMM GRANULOCYTES # BLD AUTO: 0.03 THOUSAND/UL (ref 0–0.2)
IMM GRANULOCYTES NFR BLD AUTO: 0 % (ref 0–2)
LYMPHOCYTES # BLD AUTO: 1.94 THOUSANDS/ΜL (ref 0.6–4.47)
LYMPHOCYTES NFR BLD AUTO: 28 % (ref 14–44)
MAGNESIUM SERPL-MCNC: 2.3 MG/DL (ref 1.9–2.7)
MCH RBC QN AUTO: 27.5 PG (ref 26.8–34.3)
MCHC RBC AUTO-ENTMCNC: 30 G/DL (ref 31.4–37.4)
MCV RBC AUTO: 92 FL (ref 82–98)
MONOCYTES # BLD AUTO: 1 THOUSAND/ΜL (ref 0.17–1.22)
MONOCYTES NFR BLD AUTO: 14 % (ref 4–12)
NEUTROPHILS # BLD AUTO: 3.54 THOUSANDS/ΜL (ref 1.85–7.62)
NEUTS SEG NFR BLD AUTO: 51 % (ref 43–75)
NRBC BLD AUTO-RTO: 0 /100 WBCS
PLATELET # BLD AUTO: 119 THOUSANDS/UL (ref 149–390)
PMV BLD AUTO: 11.5 FL (ref 8.9–12.7)
POTASSIUM SERPL-SCNC: 4.1 MMOL/L (ref 3.5–5.3)
RBC # BLD AUTO: 3.71 MILLION/UL (ref 3.81–5.12)
SODIUM SERPL-SCNC: 136 MMOL/L (ref 135–147)
WBC # BLD AUTO: 7.01 THOUSAND/UL (ref 4.31–10.16)

## 2022-06-11 PROCEDURE — 83735 ASSAY OF MAGNESIUM: CPT

## 2022-06-11 PROCEDURE — 99232 SBSQ HOSP IP/OBS MODERATE 35: CPT | Performed by: INTERNAL MEDICINE

## 2022-06-11 PROCEDURE — 82948 REAGENT STRIP/BLOOD GLUCOSE: CPT

## 2022-06-11 PROCEDURE — 85025 COMPLETE CBC W/AUTO DIFF WBC: CPT | Performed by: INTERNAL MEDICINE

## 2022-06-11 PROCEDURE — 99233 SBSQ HOSP IP/OBS HIGH 50: CPT | Performed by: INTERNAL MEDICINE

## 2022-06-11 PROCEDURE — 80048 BASIC METABOLIC PNL TOTAL CA: CPT

## 2022-06-11 RX ORDER — DOCUSATE SODIUM 100 MG/1
100 CAPSULE, LIQUID FILLED ORAL 2 TIMES DAILY
Status: DISCONTINUED | OUTPATIENT
Start: 2022-06-11 | End: 2022-06-12 | Stop reason: HOSPADM

## 2022-06-11 RX ORDER — INSULIN LISPRO 100 [IU]/ML
6 INJECTION, SOLUTION INTRAVENOUS; SUBCUTANEOUS
Status: DISCONTINUED | OUTPATIENT
Start: 2022-06-11 | End: 2022-06-12 | Stop reason: HOSPADM

## 2022-06-11 RX ORDER — TORSEMIDE 20 MG/1
20 TABLET ORAL DAILY
Status: DISCONTINUED | OUTPATIENT
Start: 2022-06-11 | End: 2022-06-12 | Stop reason: HOSPADM

## 2022-06-11 RX ADMIN — PRAVASTATIN SODIUM 80 MG: 80 TABLET ORAL at 17:09

## 2022-06-11 RX ADMIN — APIXABAN 5 MG: 5 TABLET, FILM COATED ORAL at 08:32

## 2022-06-11 RX ADMIN — INSULIN LISPRO 4 UNITS: 100 INJECTION, SOLUTION INTRAVENOUS; SUBCUTANEOUS at 08:33

## 2022-06-11 RX ADMIN — METOPROLOL TARTRATE 50 MG: 50 TABLET, FILM COATED ORAL at 08:32

## 2022-06-11 RX ADMIN — INSULIN LISPRO 6 UNITS: 100 INJECTION, SOLUTION INTRAVENOUS; SUBCUTANEOUS at 17:11

## 2022-06-11 RX ADMIN — TORSEMIDE 20 MG: 20 TABLET ORAL at 11:56

## 2022-06-11 RX ADMIN — INSULIN LISPRO 2 UNITS: 100 INJECTION, SOLUTION INTRAVENOUS; SUBCUTANEOUS at 17:11

## 2022-06-11 RX ADMIN — INSULIN LISPRO 8 UNITS: 100 INJECTION, SOLUTION INTRAVENOUS; SUBCUTANEOUS at 11:52

## 2022-06-11 RX ADMIN — METOPROLOL TARTRATE 50 MG: 50 TABLET, FILM COATED ORAL at 22:40

## 2022-06-11 RX ADMIN — INSULIN GLARGINE 25 UNITS: 100 INJECTION, SOLUTION SUBCUTANEOUS at 21:27

## 2022-06-11 RX ADMIN — INSULIN LISPRO 4 UNITS: 100 INJECTION, SOLUTION INTRAVENOUS; SUBCUTANEOUS at 11:52

## 2022-06-11 RX ADMIN — PREGABALIN 100 MG: 100 CAPSULE ORAL at 08:32

## 2022-06-11 RX ADMIN — INSULIN LISPRO 2 UNITS: 100 INJECTION, SOLUTION INTRAVENOUS; SUBCUTANEOUS at 08:33

## 2022-06-11 RX ADMIN — CEFAZOLIN SODIUM 2000 MG: 2 SOLUTION INTRAVENOUS at 05:44

## 2022-06-11 RX ADMIN — PREGABALIN 100 MG: 100 CAPSULE ORAL at 17:09

## 2022-06-11 RX ADMIN — INSULIN LISPRO 3 UNITS: 100 INJECTION, SOLUTION INTRAVENOUS; SUBCUTANEOUS at 21:27

## 2022-06-11 RX ADMIN — MONTELUKAST 10 MG: 10 TABLET, FILM COATED ORAL at 08:32

## 2022-06-11 RX ADMIN — APIXABAN 5 MG: 5 TABLET, FILM COATED ORAL at 17:09

## 2022-06-11 NOTE — ASSESSMENT & PLAN NOTE
Lab Results   Component Value Date    HGBA1C 6 8 (H) 03/09/2022     Recent Labs     06/10/22  1608 06/10/22  2046 06/11/22  0719 06/11/22  1147   POCGLU 267* 277* 166* 314*     Blood Sugar Average: Last 72 hrs:  (P) 234   Home Ashleyo solostar 25 U HS     Plan:  · lantus 25units at night  · 6 units Lispro TID at mealtime (increased on 6/11 due to persistently high glucose levels during the day)  · ISS- algo 4 and accuchecks   · Hypoglycemia protocol   · Diabetic diet   · Upon discharge, follow-up with PCP for better diabetic control

## 2022-06-11 NOTE — PROGRESS NOTES
[facility]  Progress Note Emilia Velazquez 1950, 73 y o  female MRN: [id number]  Unit/Bed#: S -01 Encounter: [id number]  Primary Care Provider: Jarad Austin MD   Date and time admitted to hospital: 6/4/2022  2:34 PM    * Acute on chronic diastolic CHF (congestive heart failure) ([facility] [address] )  Assessment & Plan  Wt Readings from Last 3 Encounters:   06/11/22 113 kg (248 lb 3 8 oz)   05/31/22 116 kg (255 lb)   05/26/22 114 kg (252 lb)     · POA with increased weight gain, SOB and worsening LE edema   · NT pro BNP 5/31: 1859, BNP 6/4-74  · EKG in ED:  Rate 70s, NSR  · Compliant with diuretics; no dietary indiscretion  Has noticed her urinary output decrease over the past 1 year  At recent ED visit, Torsemide inc from 20 mg to 60 mg daily  Took for 5 days prior to presentation  · Dry Wt = 244  · CXR:  No significant vascular congestion noted on my read  · Echo 3/22: LVEF 53%  · Patient is back to baseline O2 requirements  · Was given IV Bumex, switched to 60 mg torsemide on 06/09  Plan:  · Started torsemide 20mg on 06/11 a m; closely monitor BMP   · I/Os, daily weights  · Oxygen as needed to maintain saturations above 89%; no longer requires oxygen during daytime  · Low salt diet, FR 1800   · Cardiology team actively following   · Upon discharge, outpatient Cardiology follow-up    Thrombophlebitis of arm, right  Assessment & Plan  · 6/10 evening:  IV infiltrated in right arm, removed  · Overnight, patient reported sharp 8/10 pain in the area, blood cultures obtained and was started on Ancef  · No leukocytosis, fevers overnight  · On 6/11 exam: R forearm is erythematous with regular borders, slight during this upon palpation  Not actively oozing or bleeding  As compared to photographs from last night, erythema has not worsened  Plan:   · Discontinued antibiotic  · Monitor for worsening tenderness, erythema  · Ice pack  · Follow a m   Lab work      Elevated serum creatinine  Assessment & Plan  Lab Results   Component Value Date    EGFR 25 06/11/2022    EGFR 24 06/10/2022    EGFR 35 06/09/2022    CREATININE 1 94 (H) 06/11/2022    CREATININE 2 06 (H) 06/10/2022    CREATININE 1 50 (H) 06/09/2022     · Likely in setting of aggressive diuresis; elevated Cr on 6/10AM  · Baseline 1 4-1 6  · Restarted torsemide 20 mg on 06/11; closely monitor BMP    Acute respiratory failure with hypoxia (HCC)-resolved as of 6/10/2022  Assessment & Plan  · Mildly hypoxic on admission; wears nocturnal O2 for KATRINA, 2L O2  · Able to saturate appropriately on 06/06 AM  without oxygen  However, patient desaturated into 80s upon exertion on 6/6 PM    · Back to baseline O2 requirements on 6/8 AM (noctural use only)  · Etiology: Likely in setting of acute exacerbation of heart failure, now resolved  Plan:  · Monitor resp status off of oxygen, especially upon exertion  · Wean with diuresis - maintain Sat >89%  · Albuterol PRN      Paroxysmal atrial fibrillation (HCC)  Assessment & Plan  · Rate controlled  Regular rhythm noted on exam     Plan:   · Cont metoprolol BID   · Cont apixaban 5 mg b i d      Diabetes mellitus, type 2 [facility])  Assessment & Plan  Lab Results   Component Value Date    HGBA1C 6 8 (H) 03/09/2022     Recent Labs     06/10/22  1608 06/10/22  2046 06/11/22  0719 06/11/22  1147   POCGLU 267* 277* 166* 314*     Blood Sugar Average: Last 72 hrs:  (P) 234   Home Tujeo solostar 25 U HS     Plan:  · lantus 25units at night  · 6 units Lispro TID at mealtime (increased on 6/11 due to persistently high glucose levels during the day)  · ISS- algo 4 and accuchecks   · Hypoglycemia protocol   · Diabetic diet   · Upon discharge, follow-up with PCP for better diabetic control    KATRINA (obstructive sleep apnea)  Assessment & Plan  · On nocturnal O2   · Non compliant with BIPAP     Plan:   · Counseled pt extensively regarding BiPAP compliance  · Upon dc, f/u with outpatient sleep medicine team        VTE Pharmacologic Prophylaxis: VTE Score: 7 High Risk (Score >/= 5) - Pharmacological DVT Prophylaxis Ordered: apixaban (Eliquis)  Sequential Compression Devices Ordered  Patient Centered Rounds: I performed bedside rounds with nursing staff today  Discussions with Specialists or Other Care Team Provider: Cardiology     Education and Discussions with Family / Patient: will contact family today  Current Length of Stay: 7 day(s)  Current Patient Status: Inpatient   Discharge Plan: Anticipate discharge in 24-48 hrs to home  Code Status: Level 1 - Full Code    Subjective:   Patient seen and examined at bedside today  Patient seen and examined at bedside  Overnight, patient has IV line was infiltrated right forearm  She was reporting severe pain, and was started on Ancef  This morning, she denies chest pain, pnd, orthopnea, abdominal pain, nausea vomiting, fever, chills, headache, dizziness or palpitations  Has been urinating appropriately  Objective:     Vitals:   Temp (24hrs), Av 5 °F (36 9 °C), Min:97 9 °F (36 6 °C), Max:98 9 °F (37 2 °C)    Temp:  [97 9 °F (36 6 °C)-98 9 °F (37 2 °C)] 97 9 °F (36 6 °C)  HR:  [60-85] 60  Resp:  [18-20] 20  BP: (123-138)/(55-61) 128/61  SpO2:  [93 %-97 %] 97 %  Body mass index is 45 4 kg/m²  Input and Output Summary (last 24 hours): Intake/Output Summary (Last 24 hours) at 2022 1215  Last data filed at 2022 1101  Gross per 24 hour   Intake 480 ml   Output 1850 ml   Net -1370 ml     Physical Exam   Vitals reviewed  General Examination:  sitting in chair, pleasant, cooperative   HEENT: Normocephalic, Atraumatic  Extraocular movements intact, PERRLA  CVS: S1, S2 noted  Regular rhythm  Lungs:  Decreased breath sounds bilaterally given body habitus  No conversational dyspnea, normal effort  Abdomen: Soft, normal bowel sounds  Non distended, non tender  Ext:  Mild pitting edema in bilateral lower extremities noted  Psych:  Though Process - logical  Skin: R forearm:  Erythematous with regular borders, slight during this upon palpation  Not actively oozing or bleeding  As compared to photographs from last night, erythema has not worsened  Neuro: A, Ox3  Follows simple, 3 steps commands  Appropriate antigravity strength in all 4 extremities proximally, distally  Additional Data:     Labs:  Results from last 7 days   Lab Units 06/11/22  0544   WBC Thousand/uL 7 01   HEMOGLOBIN g/dL 10 2*   HEMATOCRIT % 34 0*   PLATELETS Thousands/uL 119*   NEUTROS PCT % 51   LYMPHS PCT % 28   MONOS PCT % 14*   EOS PCT % 6     Results from last 7 days   Lab Units 06/11/22  0544 06/05/22  0345 06/04/22  1547   SODIUM mmol/L 136   < > 139   POTASSIUM mmol/L 4 1   < > 4 5   CHLORIDE mmol/L 93*   < > 98   CO2 mmol/L 36*   < > 32   BUN mg/dL 98*   < > 69*   CREATININE mg/dL 1 94*   < > 1 62*   ANION GAP mmol/L 7   < > 9   CALCIUM mg/dL 8 4   < > 8 8   ALBUMIN g/dL  --   --  3 8   TOTAL BILIRUBIN mg/dL  --   --  0 37   ALK PHOS U/L  --   --  61   ALT U/L  --   --  12   AST U/L  --   --  15   GLUCOSE RANDOM mg/dL 180*   < > 196*    < > = values in this interval not displayed       Results from last 7 days   Lab Units 06/04/22  1547   INR  1 25*     Results from last 7 days   Lab Units 06/11/22  1147 06/11/22  0719 06/10/22  2046 06/10/22  1608 06/10/22  1110 06/10/22  0748 06/09/22  2137 06/09/22  1750 06/09/22  1548 06/09/22  1106 06/09/22  0727 06/08/22  2116   POC GLUCOSE mg/dl 314* 166* 277* 267* 260* 161* 249* 220* 257* 288* 190* 203*               Lines/Drains:  Invasive Devices  Report    Peripheral Intravenous Line  Duration           Peripheral IV 06/10/22 Left;Ventral (anterior) Forearm <1 day                Imaging reports reviewed from this admission including:   XR chest 2 views   ED Interpretation by Elfego Santana PA-C (06/04 1651)   Mild signs of vascular congestion        Final Result by Trav Thurman MD (06/05 7301)      No acute cardiopulmonary disease  Workstation performed: [id number]               Recent Cultures (last 7 days):   Results from last 7 days   Lab Units 06/10/22  2230 06/10/22  2216   BLOOD CULTURE  Received in Microbiology Lab  Culture in Progress  Received in Microbiology Lab  Culture in Progress  Last 24 Hours Medication List:   Current Facility-Administered Medications   Medication Dose Route Frequency Provider Last Rate    acetaminophen  975 mg Oral Q8H PRN Mike Castellanos MD      albuterol  2 puff Inhalation Q6H PRN Narayan Carrillo MD      apixaban  5 mg Oral BID Narayan Carrillo MD      docusate sodium  100 mg Oral BID Hoa Dejesus MD      insulin glargine  25 Units Subcutaneous HS Narayan Carrillo MD      insulin lispro  1-6 Units Subcutaneous HS Narayan Carrillo MD      insulin lispro  2-12 Units Subcutaneous TID [facility] Hoa Dejesus MD      insulin lispro  6 Units Subcutaneous TID With Meals Hoa Dejesus MD      metoprolol tartrate  50 mg Oral Q12H [address] Narayan Carrillo MD      montelukast  10 mg Oral Daily Narayan Carrillo MD      pravastatin  80 mg Oral Daily With Ruth Ann Corrales MD      pregabalin  100 mg Oral BID Narayan Carrillo MD      torsemide  20 mg Oral Daily Marii Duncan MD          Today, Patient Was Seen By: Hoa Dejesus MD    **Please Note: This note may have been constructed using a voice recognition system  **

## 2022-06-11 NOTE — PLAN OF CARE
Problem: Potential for Falls  Goal: Patient will remain free of falls  Description: INTERVENTIONS:  - Educate patient/family on patient safety including physical limitations  - Instruct patient to call for assistance with activity   - Consult OT/PT to assist with strengthening/mobility   - Keep Call bell within reach  - Keep bed low and locked with side rails adjusted as appropriate  - Keep care items and personal belongings within reach  - Initiate and maintain comfort rounds  - Make Fall Risk Sign visible to staff  - Offer Toileting every 2 Hours, in advance of need  - Initiate/Maintain bed/chair alarm  - Obtain necessary fall risk management equipment  - Apply yellow socks and bracelet for high fall risk patients  - Consider moving patient to room near nurses station  Outcome: Progressing     Problem: MOBILITY - ADULT  Goal: Maintain or return to baseline ADL function  Description: INTERVENTIONS:  -  Assess patient's ability to carry out ADLs; assess patient's baseline for ADL function and identify physical deficits which impact ability to perform ADLs (bathing, care of mouth/teeth, toileting, grooming, dressing, etc )  - Assess/evaluate cause of self-care deficits   - Assess range of motion  - Assess patient's mobility; develop plan if impaired  - Assess patient's need for assistive devices and provide as appropriate  - Encourage maximum independence but intervene and supervise when necessary  - Involve family in performance of ADLs  - Assess for home care needs following discharge   - Consider OT consult to assist with ADL evaluation and planning for discharge  - Provide patient education as appropriate  Outcome: Progressing  Goal: Maintains/Returns to pre admission functional level  Description: INTERVENTIONS:  - Perform BMAT or MOVE assessment daily    - Set and communicate daily mobility goal to care team and patient/family/caregiver     - Collaborate with rehabilitation services on mobility goals if consulted  - Dangle patient   - Stand patient  - Ambulate patient   - Out of bed to chair   - Out of bed for meals   - Out of bed for toileting  - Record patient progress and toleration of activity level   Outcome: Progressing     Problem: Prexisting or High Potential for Compromised Skin Integrity  Goal: Skin integrity is maintained or improved  Description: INTERVENTIONS:  - Identify patients at risk for skin breakdown  - Assess and monitor skin integrity  - Assess and monitor nutrition and hydration status  - Monitor labs   - Assess for incontinence   - Turn and reposition patient  - Assist with mobility/ambulation  - Relieve pressure over bony prominences  - Avoid friction and shearing  - Provide appropriate hygiene as needed including keeping skin clean and dry  - Evaluate need for skin moisturizer/barrier cream  - Collaborate with interdisciplinary team   - Patient/family teaching  - Consider wound care consult   Outcome: Progressing     Problem: CARDIOVASCULAR - ADULT  Goal: Maintains optimal cardiac output and hemodynamic stability  Description: INTERVENTIONS:  - Monitor I/O, vital signs and rhythm  - Monitor for S/S and trends of decreased cardiac output  - Administer and titrate ordered vasoactive medications to optimize hemodynamic stability  - Assess quality of pulses, skin color and temperature  - Assess for signs of decreased coronary artery perfusion  - Instruct patient to report change in severity of symptoms  Outcome: Progressing  Goal: Absence of cardiac dysrhythmias or at baseline rhythm  Description: INTERVENTIONS:  - Continuous cardiac monitoring, vital signs, obtain 12 lead EKG if ordered  - Administer antiarrhythmic and heart rate control medications as ordered  - Monitor electrolytes and administer replacement therapy as ordered  Outcome: Progressing     Problem: Nutrition/Hydration-ADULT  Goal: Nutrient/Hydration intake appropriate for improving, restoring or maintaining nutritional needs  Description: Monitor and assess patient's nutrition/hydration status for malnutrition  Collaborate with interdisciplinary team and initiate plan and interventions as ordered  Monitor patient's weight and dietary intake as ordered or per policy  Utilize nutrition screening tool and intervene as necessary  Determine patient's food preferences and provide high-protein, high-caloric foods as appropriate       INTERVENTIONS:  - Monitor oral intake, urinary output, labs, and treatment plans  - Assess nutrition and hydration status and recommend course of action  - Evaluate amount of meals eaten  - Assist patient with eating if necessary   - Allow adequate time for meals  - Recommend/ encourage appropriate diets, oral nutritional supplements, and vitamin/mineral supplements  - Order, calculate, and assess calorie counts as needed  - Recommend, monitor, and adjust tube feedings and TPN/PPN based on assessed needs  - Assess need for intravenous fluids  - Provide specific nutrition/hydration education as appropriate  - Include patient/family/caregiver in decisions related to nutrition  Outcome: Progressing     Problem: RESPIRATORY - ADULT  Goal: Achieves optimal ventilation and oxygenation  Description: INTERVENTIONS:  - Assess for changes in respiratory status  - Assess for changes in mentation and behavior  - Position to facilitate oxygenation and minimize respiratory effort  - Oxygen administered by appropriate delivery if ordered  - Initiate smoking cessation education as indicated  - Encourage broncho-pulmonary hygiene including cough, deep breathe, Incentive Spirometry  - Assess the need for suctioning and aspirate as needed  - Assess and instruct to report SOB or any respiratory difficulty  - Respiratory Therapy support as indicated  Outcome: Progressing

## 2022-06-11 NOTE — QUICK NOTE
I spoke to this patient's Ms  Manpreet Burgos,  over the phone to provide a comprehensive clinical update  I answered all of her questions and addressed all of her concerns to the best of my ability and to her satisfaction

## 2022-06-11 NOTE — ASSESSMENT & PLAN NOTE
· 6/10 evening:  IV infiltrated in right arm, removed  · Overnight, patient reported sharp 8/10 pain in the area, blood cultures obtained and was started on Ancef  · No leukocytosis, fevers overnight  · On 6/11 exam: R forearm is erythematous with regular borders, slight during this upon palpation  Not actively oozing or bleeding  As compared to photographs from last night, erythema has not worsened  Plan:   · Discontinued antibiotic  · Monitor for worsening tenderness, erythema  · Ice pack  · Follow a m   Lab work

## 2022-06-11 NOTE — PROGRESS NOTES
Progress Note - Cardiology   Mirna Chase 73 y o  female MRN: [id number]  Unit/Bed#: S -01 Encounter: [id number]    Assessment:  Principal Problem:    Acute on chronic diastolic CHF (congestive heart failure) ([facility])  Active Problems:    Paroxysmal atrial fibrillation (HCC)    Diabetes mellitus, type 2 (HCC)    KATRINA (obstructive sleep apnea)    Elevated serum creatinine      Plan:  Creatinine is roughly stable  Will need some diuretic  Will likely need to accept a little higher creatinine, but she is anxious about this  Give 20mg torsemide, she is also autodiuresing  PAF stable  Treatment of infiltrated IV/cellulitis per primary team       Subjective/Objective     Subjective: Infiltrated IV  Got some antibiotics  No shortness of breath  Weight is up, but I/O negative despite no diuretics, says she is urinating regularly  Creatinine stable, but elevated above baseline  Objective:    Vitals: /60 (BP Location: Right arm)   Pulse 70   Temp 97 9 °F (36 6 °C) (Oral)   Resp 20   Ht 5' 2" (1 575 m)   Wt 113 kg (248 lb 3 8 oz)   SpO2 97%   BMI 45 40 kg/m²   Vitals:    06/10/22 0600 06/11/22 0544   Weight: 112 kg (247 lb 9 2 oz) 113 kg (248 lb 3 8 oz)     Orthostatic Blood Pressures    Flowsheet Row Most Recent Value   Blood Pressure 123/60 filed at 06/11/2022 5336   Patient Position - Orthostatic VS Lying filed at 06/11/2022 3594            Intake/Output Summary (Last 24 hours) at 6/11/2022 1120  Last data filed at 6/11/2022 0830  Gross per 24 hour   Intake 480 ml   Output 1700 ml   Net -1220 ml     Physical Exam:   General appearance: alert and in no acute distress  Head: Normocephalic, without obvious abnormality, atraumatic  Neck: no carotid bruit, no JVD and supple, symmetrical, trachea midline  Lungs: bilateral rales  Heart: S1, S2 normal and no S3 or S4  No murmurs  Abdomen: Obese, soft, non-tender; bowel sounds normal; no masses,  no organomegaly  Extremities: RUE erythema   Area is marked  Slight expansion  Pulses: symmetric bilaterally  Skin: Skin color, texture, turgor normal  No rashes or lesions  Neurologic: Grossly normal  Alert and oriented      Medications:    Current Facility-Administered Medications:     acetaminophen (TYLENOL) tablet 975 mg, 975 mg, Oral, Q8H PRN, Jaiden Lemus MD, 975 mg at 06/07/22 1541    albuterol (PROVENTIL HFA,VENTOLIN HFA) inhaler 2 puff, 2 puff, Inhalation, Q6H PRN, Susanne Cortez MD    [facility]) tablet 5 mg, 5 mg, Oral, BID, Susanne Cortez MD, 5 mg at 06/11/22 7582    docusate sodium (COLACE) capsule 100 mg, 100 mg, Oral, BID, Johnie Barth MD    insulin glargine (LANTUS) subcutaneous injection 25 Units 0 25 mL, 25 Units, Subcutaneous, HS, Susanne Cortez MD, 25 Units at 06/10/22 2230    insulin lispro (HumaLOG) 100 units/mL subcutaneous injection 1-6 Units, 1-6 Units, Subcutaneous, HS, Susanne Cotrez MD, 4 Units at 06/10/22 2231    insulin lispro (HumaLOG) 100 units/mL subcutaneous injection 2-12 Units, 2-12 Units, Subcutaneous, TID AC, 2 Units at 06/11/22 0833 **AND** Fingerstick Glucose (POCT), , , TID AC, Johnie Barth MD    insulin lispro (HumaLOG) 100 units/mL subcutaneous injection 4 Units, 4 Units, Subcutaneous, TID With Meals, Johnie Barth MD, 4 Units at 06/11/22 0833    metoprolol tartrate (LOPRESSOR) tablet 50 mg, 50 mg, Oral, Q12H [address], Susanne Cortez MD, 50 mg at 06/11/22 0832    montelukast (SINGULAIR) tablet 10 mg, 10 mg, Oral, Daily, Susanne Cortez MD, 10 mg at 06/11/22 8749    pravastatin (PRAVACHOL) tablet 80 mg, 80 mg, Oral, Daily With Juanjose Lambert MD, 80 mg at 06/10/22 1702    pregabalin (LYRICA) capsule 100 mg, 100 mg, Oral, BID, Susanne Cortez MD, 100 mg at 06/11/22 0832    torsemide (DEMADEX) tablet 20 mg, 20 mg, Oral, Daily, Santi Oscar MD    Lab Results:      Results from last 7 days   Lab Units 06/11/22  0544 06/04/22  1547   WBC Thousand/uL 7 01 8 08   HEMOGLOBIN g/dL 10 2* 11 0* HEMATOCRIT % 34 0* 35 8   PLATELETS Thousands/uL 119* 181         Results from last 7 days   Lab Units 06/11/22  0544 06/10/22  0559 06/09/22  0530 06/05/22  0345 06/04/22  1547   SODIUM mmol/L 136 137 137   < > 139   POTASSIUM mmol/L 4 1 4 2 4 2   < > 4 5   CHLORIDE mmol/L 93* 94* 94*   < > 98   CO2 mmol/L 36* 35* 35*   < > 32   BUN mg/dL 98* 91* 82*   < > 69*   CREATININE mg/dL 1 94* 2 06* 1 50*   < > 1 62*   CALCIUM mg/dL 8 4 8 4 8 8   < > 8 8   ALK PHOS U/L  --   --   --   --  61   ALT U/L  --   --   --   --  12   AST U/L  --   --   --   --  15    < > = values in this interval not displayed       Results from last 7 days   Lab Units 06/04/22  1547   INR  1 25*   PTT seconds 31     Results from last 7 days   Lab Units 06/11/22  0544 06/05/22  0345   MAGNESIUM mg/dL 2 3 2 0

## 2022-06-11 NOTE — ASSESSMENT & PLAN NOTE
Lab Results   Component Value Date    EGFR 25 06/11/2022    EGFR 24 06/10/2022    EGFR 35 06/09/2022    CREATININE 1 94 (H) 06/11/2022    CREATININE 2 06 (H) 06/10/2022    CREATININE 1 50 (H) 06/09/2022     · Likely in setting of aggressive diuresis; elevated Cr on 6/10AM  · Baseline 1 4-1 6  · Restarted torsemide 20 mg on 06/11; closely monitor BMP

## 2022-06-11 NOTE — PLAN OF CARE
Problem: Potential for Falls  Goal: Patient will remain free of falls  Description: INTERVENTIONS:  - Educate patient/family on patient safety including physical limitations  - Instruct patient to call for assistance with activity   - Consult OT/PT to assist with strengthening/mobility   - Keep Call bell within reach  - Keep bed low and locked with side rails adjusted as appropriate  - Keep care items and personal belongings within reach  - Initiate and maintain comfort rounds  - Make Fall Risk Sign visible to staff  - Offer Toileting every  Hours, in advance of need  - Initiate/Maintain alarm  - Obtain necessary fall risk management equipment:   - Apply yellow socks and bracelet for high fall risk patients  - Consider moving patient to room near nurses station  Outcome: Progressing     Problem: MOBILITY - ADULT  Goal: Maintain or return to baseline ADL function  Description: INTERVENTIONS:  -  Assess patient's ability to carry out ADLs; assess patient's baseline for ADL function and identify physical deficits which impact ability to perform ADLs (bathing, care of mouth/teeth, toileting, grooming, dressing, etc )  - Assess/evaluate cause of self-care deficits   - Assess range of motion  - Assess patient's mobility; develop plan if impaired  - Assess patient's need for assistive devices and provide as appropriate  - Encourage maximum independence but intervene and supervise when necessary  - Involve family in performance of ADLs  - Assess for home care needs following discharge   - Consider OT consult to assist with ADL evaluation and planning for discharge  - Provide patient education as appropriate  Outcome: Progressing  Goal: Maintains/Returns to pre admission functional level  Description: INTERVENTIONS:  - Perform BMAT or MOVE assessment daily    - Set and communicate daily mobility goal to care team and patient/family/caregiver     - Collaborate with rehabilitation services on mobility goals if consulted  - Perform Range of Motion  times a day  - Reposition patient every  hours    - Dangle patient  times a day  - Stand patient  times a day  - Ambulate patient  times a day  - Out of bed to chair  times a day   - Out of bed for meals times a day  - Out of bed for toileting  - Record patient progress and toleration of activity level   Outcome: Progressing     Problem: Prexisting or High Potential for Compromised Skin Integrity  Goal: Skin integrity is maintained or improved  Description: INTERVENTIONS:  - Identify patients at risk for skin breakdown  - Assess and monitor skin integrity  - Assess and monitor nutrition and hydration status  - Monitor labs   - Assess for incontinence   - Turn and reposition patient  - Assist with mobility/ambulation  - Relieve pressure over bony prominences  - Avoid friction and shearing  - Provide appropriate hygiene as needed including keeping skin clean and dry  - Evaluate need for skin moisturizer/barrier cream  - Collaborate with interdisciplinary team   - Patient/family teaching  - Consider wound care consult   Outcome: Progressing     Problem: CARDIOVASCULAR - ADULT  Goal: Maintains optimal cardiac output and hemodynamic stability  Description: INTERVENTIONS:  - Monitor I/O, vital signs and rhythm  - Monitor for S/S and trends of decreased cardiac output  - Administer and titrate ordered vasoactive medications to optimize hemodynamic stability  - Assess quality of pulses, skin color and temperature  - Assess for signs of decreased coronary artery perfusion  - Instruct patient to report change in severity of symptoms  Outcome: Progressing  Goal: Absence of cardiac dysrhythmias or at baseline rhythm  Description: INTERVENTIONS:  - Continuous cardiac monitoring, vital signs, obtain 12 lead EKG if ordered  - Administer antiarrhythmic and heart rate control medications as ordered  - Monitor electrolytes and administer replacement therapy as ordered  Outcome: Progressing

## 2022-06-11 NOTE — ASSESSMENT & PLAN NOTE
Wt Readings from Last 3 Encounters:   06/11/22 113 kg (248 lb 3 8 oz)   05/31/22 116 kg (255 lb)   05/26/22 114 kg (252 lb)     · POA with increased weight gain, SOB and worsening LE edema   · NT pro BNP 5/31: 1859, BNP 6/4-74  · EKG in ED:  Rate 70s, NSR  · Compliant with diuretics; no dietary indiscretion  Has noticed her urinary output decrease over the past 1 year  At recent ED visit, Torsemide inc from 20 mg to 60 mg daily  Took for 5 days prior to presentation  · Dry Wt = 244  · CXR:  No significant vascular congestion noted on my read  · Echo 3/22: LVEF 53%  · Patient is back to baseline O2 requirements  · Was given IV Bumex, switched to 60 mg torsemide on 06/09  Plan:  · Started torsemide 20mg on 06/11 a m; closely monitor BMP   · I/Os, daily weights  · Oxygen as needed to maintain saturations above 89%; no longer requires oxygen during daytime    · Low salt diet, FR 1800   · Cardiology team actively following   · Upon discharge, outpatient Cardiology follow-up

## 2022-06-12 VITALS
TEMPERATURE: 97.5 F | DIASTOLIC BLOOD PRESSURE: 57 MMHG | OXYGEN SATURATION: 97 % | SYSTOLIC BLOOD PRESSURE: 114 MMHG | WEIGHT: 248.8 LBS | HEART RATE: 69 BPM | HEIGHT: 62 IN | RESPIRATION RATE: 20 BRPM | BODY MASS INDEX: 45.78 KG/M2

## 2022-06-12 LAB
ANION GAP SERPL CALCULATED.3IONS-SCNC: 8 MMOL/L (ref 4–13)
BUN SERPL-MCNC: 97 MG/DL (ref 5–25)
CALCIUM SERPL-MCNC: 8.7 MG/DL (ref 8.4–10.2)
CHLORIDE SERPL-SCNC: 95 MMOL/L (ref 96–108)
CO2 SERPL-SCNC: 35 MMOL/L (ref 21–32)
CREAT SERPL-MCNC: 1.93 MG/DL (ref 0.6–1.3)
GFR SERPL CREATININE-BSD FRML MDRD: 26 ML/MIN/1.73SQ M
GLUCOSE SERPL-MCNC: 184 MG/DL (ref 65–140)
GLUCOSE SERPL-MCNC: 202 MG/DL (ref 65–140)
GLUCOSE SERPL-MCNC: 264 MG/DL (ref 65–140)
GLUCOSE SERPL-MCNC: 267 MG/DL (ref 65–140)
POTASSIUM SERPL-SCNC: 4 MMOL/L (ref 3.5–5.3)
SODIUM SERPL-SCNC: 138 MMOL/L (ref 135–147)

## 2022-06-12 PROCEDURE — 80048 BASIC METABOLIC PNL TOTAL CA: CPT

## 2022-06-12 PROCEDURE — 99232 SBSQ HOSP IP/OBS MODERATE 35: CPT | Performed by: INTERNAL MEDICINE

## 2022-06-12 PROCEDURE — 82948 REAGENT STRIP/BLOOD GLUCOSE: CPT

## 2022-06-12 PROCEDURE — 99239 HOSP IP/OBS DSCHRG MGMT >30: CPT | Performed by: INTERNAL MEDICINE

## 2022-06-12 RX ORDER — CEPHALEXIN 500 MG/1
500 CAPSULE ORAL EVERY 8 HOURS SCHEDULED
Qty: 21 CAPSULE | Refills: 0 | Status: SHIPPED | OUTPATIENT
Start: 2022-06-12 | End: 2022-06-19

## 2022-06-12 RX ADMIN — APIXABAN 5 MG: 5 TABLET, FILM COATED ORAL at 09:08

## 2022-06-12 RX ADMIN — METOPROLOL TARTRATE 50 MG: 50 TABLET, FILM COATED ORAL at 09:08

## 2022-06-12 RX ADMIN — TORSEMIDE 20 MG: 20 TABLET ORAL at 09:09

## 2022-06-12 RX ADMIN — PREGABALIN 100 MG: 100 CAPSULE ORAL at 09:08

## 2022-06-12 RX ADMIN — INSULIN LISPRO 6 UNITS: 100 INJECTION, SOLUTION INTRAVENOUS; SUBCUTANEOUS at 09:09

## 2022-06-12 RX ADMIN — INSULIN LISPRO 6 UNITS: 100 INJECTION, SOLUTION INTRAVENOUS; SUBCUTANEOUS at 09:14

## 2022-06-12 RX ADMIN — INSULIN LISPRO 6 UNITS: 100 INJECTION, SOLUTION INTRAVENOUS; SUBCUTANEOUS at 12:22

## 2022-06-12 RX ADMIN — MONTELUKAST 10 MG: 10 TABLET, FILM COATED ORAL at 09:08

## 2022-06-12 RX ADMIN — INSULIN LISPRO 6 UNITS: 100 INJECTION, SOLUTION INTRAVENOUS; SUBCUTANEOUS at 12:23

## 2022-06-12 NOTE — PLAN OF CARE
Problem: Potential for Falls  Goal: Patient will remain free of falls  Description: INTERVENTIONS:  - Educate patient/family on patient safety including physical limitations  - Instruct patient to call for assistance with activity   - Consult OT/PT to assist with strengthening/mobility   - Keep Call bell within reach  - Keep bed low and locked with side rails adjusted as appropriate  - Keep care items and personal belongings within reach  - Initiate and maintain comfort rounds  - Make Fall Risk Sign visible to staff  - Offer Toileting every  Hours, in advance of need  - Initiate/Maintain larm  - Obtain necessary fall risk management equipment:   - Apply yellow socks and bracelet for high fall risk patients  - Consider moving patient to room near nurses station  Outcome: Completed     Problem: MOBILITY - ADULT  Goal: Maintain or return to baseline ADL function  Description: INTERVENTIONS:  -  Assess patient's ability to carry out ADLs; assess patient's baseline for ADL function and identify physical deficits which impact ability to perform ADLs (bathing, care of mouth/teeth, toileting, grooming, dressing, etc )  - Assess/evaluate cause of self-care deficits   - Assess range of motion  - Assess patient's mobility; develop plan if impaired  - Assess patient's need for assistive devices and provide as appropriate  - Encourage maximum independence but intervene and supervise when necessary  - Involve family in performance of ADLs  - Assess for home care needs following discharge   - Consider OT consult to assist with ADL evaluation and planning for discharge  - Provide patient education as appropriate  Outcome: Completed  Goal: Maintains/Returns to pre admission functional level  Description: INTERVENTIONS:  - Perform BMAT or MOVE assessment daily    - Set and communicate daily mobility goal to care team and patient/family/caregiver     - Collaborate with rehabilitation services on mobility goals if consulted  - Perform Range of Motion  times a day  - Reposition patient every hours    - Dangle patient  times a day  - Stand patient  times a day  - Ambulate patient  times a day  - Out of bed to chair  times a day   - Out of bed for meals imes a day  - Out of bed for toileting  - Record patient progress and toleration of activity level   Outcome: Completed     Problem: Prexisting or High Potential for Compromised Skin Integrity  Goal: Skin integrity is maintained or improved  Description: INTERVENTIONS:  - Identify patients at risk for skin breakdown  - Assess and monitor skin integrity  - Assess and monitor nutrition and hydration status  - Monitor labs   - Assess for incontinence   - Turn and reposition patient  - Assist with mobility/ambulation  - Relieve pressure over bony prominences  - Avoid friction and shearing  - Provide appropriate hygiene as needed including keeping skin clean and dry  - Evaluate need for skin moisturizer/barrier cream  - Collaborate with interdisciplinary team   - Patient/family teaching  - Consider wound care consult   Outcome: Completed     Problem: CARDIOVASCULAR - ADULT  Goal: Maintains optimal cardiac output and hemodynamic stability  Description: INTERVENTIONS:  - Monitor I/O, vital signs and rhythm  - Monitor for S/S and trends of decreased cardiac output  - Administer and titrate ordered vasoactive medications to optimize hemodynamic stability  - Assess quality of pulses, skin color and temperature  - Assess for signs of decreased coronary artery perfusion  - Instruct patient to report change in severity of symptoms  Outcome: Completed  Goal: Absence of cardiac dysrhythmias or at baseline rhythm  Description: INTERVENTIONS:  - Continuous cardiac monitoring, vital signs, obtain 12 lead EKG if ordered  - Administer antiarrhythmic and heart rate control medications as ordered  - Monitor electrolytes and administer replacement therapy as ordered  Outcome: Completed     Problem: Nutrition/Hydration-ADULT  Goal: Nutrient/Hydration intake appropriate for improving, restoring or maintaining nutritional needs  Description: Monitor and assess patient's nutrition/hydration status for malnutrition  Collaborate with interdisciplinary team and initiate plan and interventions as ordered  Monitor patient's weight and dietary intake as ordered or per policy  Utilize nutrition screening tool and intervene as necessary  Determine patient's food preferences and provide high-protein, high-caloric foods as appropriate       INTERVENTIONS:  - Monitor oral intake, urinary output, labs, and treatment plans  - Assess nutrition and hydration status and recommend course of action  - Evaluate amount of meals eaten  - Assist patient with eating if necessary   - Allow adequate time for meals  - Recommend/ encourage appropriate diets, oral nutritional supplements, and vitamin/mineral supplements  - Order, calculate, and assess calorie counts as needed  - Recommend, monitor, and adjust tube feedings and TPN/PPN based on assessed needs  - Assess need for intravenous fluids  - Provide specific nutrition/hydration education as appropriate  - Include patient/family/caregiver in decisions related to nutrition  Outcome: Completed     Problem: RESPIRATORY - ADULT  Goal: Achieves optimal ventilation and oxygenation  Description: INTERVENTIONS:  - Assess for changes in respiratory status  - Assess for changes in mentation and behavior  - Position to facilitate oxygenation and minimize respiratory effort  - Oxygen administered by appropriate delivery if ordered  - Initiate smoking cessation education as indicated  - Encourage broncho-pulmonary hygiene including cough, deep breathe, Incentive Spirometry  - Assess the need for suctioning and aspirate as needed  - Assess and instruct to report SOB or any respiratory difficulty  - Respiratory Therapy support as indicated  Outcome: Completed

## 2022-06-12 NOTE — ASSESSMENT & PLAN NOTE
· Patient is having to tolerate a higher creatinine to maintain euvolemia  · New baseline will likely be around 1 7-1 9

## 2022-06-12 NOTE — ASSESSMENT & PLAN NOTE
· POA with increased weight gain, SOB and worsening LE edema   · NT pro BNP 5/31: 1859, BNP 6/4-74  · EKG in ED:  Rate 70s, NSR  · Compliant with diuretics; no dietary indiscretion  Has noticed her urinary output decrease over the past 1 year  At recent ED visit, Torsemide inc from 20 mg to 60 mg daily  Took for 5 days prior to presentation  · Dry Wt = 244  · CXR:  No significant vascular congestion noted on my read  · Echo 3/22: LVEF 53%  · Patient is back to baseline O2 requirements  · Was given IV Bumex, switched to 60 mg torsemide on 06/09       Plan:  · Discharged on torsemide 20 mg  · Will obtain BMP in 4 days  · Encourage Low salt diet, FR 1800   · Upon discharge, outpatient Cardiology follow-up

## 2022-06-12 NOTE — DISCHARGE SUMMARY
[facility]  Discharge- Rick Chavez 1950, 73 y o  female MRN: [id number]  Unit/Bed#: S -01 Encounter: [id number]  Primary Care Provider: Coco Womack MD   Date and time admitted to hospital: 6/4/2022  2:34 PM    * Acute on chronic diastolic CH[facility]congestive [facility]  Assessment & Plan  · POA with increased weight gain, SOB and worsening LE edema   · NT pro BNP 5/31: 1859, BNP 6/4-74  · EKG in ED:  Rate 70s, NSR  · Compliant with diuretics; no dietary indiscretion  Has noticed her urinary output decrease over the past 1 year  At recent ED visit, Torsemide inc from 20 mg to 60 mg daily  Took for 5 days prior to presentation  · Dry Wt = 244  · CXR:  No significant vascular congestion noted on my read  · Echo 3/22: LVEF 53%  · Patient is back to baseline O2 requirements  · Was given IV Bumex, switched to 60 mg torsemide on 06/09  Plan:  · Discharged on torsemide 20 mg  · Will obtain BMP in 4 days  · Encourage Low salt diet, FR 1800   · Upon discharge, outpatient Cardiology follow-up    Thrombophlebitis of arm, right  Assessment & Plan  · 6/10 evening:  IV infiltrated in right arm, removed  · Patient likely developed thrombophlebitis, has not fully resolved yet  Plan: Will discharge with Keflex for 7 days    Elevated serum creatinine  Assessment & Plan  · Patient is having to tolerate a higher creatinine to maintain euvolemia  · New baseline will likely be around 1 7-1 9    KATRINA (obstructive sleep apnea)  Assessment & Plan  · On nocturnal O2   · Non compliant with BIPAP     Plan:   · Counseled pt extensively regarding BiPAP compliance  · Upon dc, f/u with outpatient sleep medicine team    Diabetes mellitus, type 2 ([facility] )  Assessment & Plan  Patient's most recent A1c 6 8    Is on Lantus 25 units at home    Plan:  · Continue home regimen  · Encourage lifestyle modification  · Upon discharge, follow-up with PCP for better diabetic control    Paroxysmal atrial fibrillation ([facility] )  Assessment & Plan  · Rate controlled  Regular rhythm noted on exam     Plan:   · Cont metoprolol BID   · Cont apixaban 5 mg b i d  Acute respiratory failure with hypoxia ([facility])-resolved as of 6/10/2022  Assessment & Plan  · Mildly hypoxic on admission; wears nocturnal O2 for KATRINA, 2L O2  · Able to saturate appropriately on 06/06 AM  without oxygen  However, patient desaturated into 80s upon exertion on 6/6 PM    · Back to baseline O2 requirements on 6/8 AM (noctural use only)  · Etiology: Likely in setting of acute exacerbation of heart failure, now resolved  Plan:  · Monitor resp status off of oxygen, especially upon exertion  · Wean with diuresis - maintain Sat >89%  · Albuterol PRN        Medical Problems             Resolved Problems  Date Reviewed: 6/5/2022          Resolved    Acute respiratory failure with hypoxia ([facility] ) 6/10/2022     Resolved by  Marialuisa Parsons MD              Discharging Resident: Linda Holloway MD  Discharging Attending: Randi Brady MD  PCP: Oswaldo Aden MD  Admission Date:   Admission Orders (From admission, onward)     Ordered        06/04/22 1807  INPATIENT ADMISSION  Once                      Discharge Date: 06/12/22    Consultations During Hospital Stay:  · Cardiology    Procedures Performed:   ·     Significant Findings / Test Results:   ·     Incidental Findings:   ·      Test Results Pending at Discharge (will require follow up):  ·      Outpatient Tests Requested:  · BMP in 3-4 days    Complications: Thrombophlebitis, discharged with antibiotics    Reason for Admission:  Acute on chronic diastolic heart failure    Hospital Course:   Thea Dean is a 73 y o  female with past medical history of AFib on Eliquis, KATRINA, diastolic dysfunction who originally presented to the hospital on 6/4/2022 due to worsening shortness of breath and requiring higher oxygen requirements  At the time of admission patient had significant lower extremity edema  Patient's weight during admission was 251 lb  At the time of admission patient was discharged 248 lb  Patient did not tolerate higher dose of diuretics, and was eventually transition to 20 mg of oral torsemide  Patient will also follow up with Cardiology as an outpatient for close monitoring of volume status and diuretic response  Please see above list of diagnoses and related plan for additional information  Condition at Discharge: good    Discharge Day Visit / Exam:   Subjective:  Patient does not have any complaints this morning  Does not have any shortness of breath, is able to ambulate without any increased oxygen requirements  Vitals: Blood Pressure: 114/57 (06/12/22 0828)  Pulse: 69 (06/12/22 0828)  Temperature: 97 5 °F (36 4 °C) (06/12/22 0828)  Temp Source: Oral (06/12/22 0828)  Respirations: 20 (06/12/22 0828)  Height: 5' 2" (157 5 cm) (06/04/22 1946)  Weight - Scale: 113 kg (248 lb 12 8 oz) (06/12/22 0441)  SpO2: 97 % (06/12/22 4501)  Exam:   Physical Exam  Vitals and nursing note reviewed  Constitutional:       General: She is not in acute distress  Appearance: She is well-developed  HENT:      Head: Normocephalic and atraumatic  Mouth/Throat:      Mouth: Mucous membranes are moist       Pharynx: No oropharyngeal exudate  Eyes:      Extraocular Movements: Extraocular movements intact  Conjunctiva/sclera: Conjunctivae normal       Pupils: Pupils are equal, round, and reactive to light  Cardiovascular:      Rate and Rhythm: Normal rate and regular rhythm  Pulses: Normal pulses  Heart sounds: No murmur heard  Pulmonary:      Effort: Pulmonary effort is normal  No respiratory distress  Breath sounds: Normal breath sounds  Abdominal:      Palpations: Abdomen is soft  Tenderness: There is no abdominal tenderness  Musculoskeletal:         General: Normal range of motion  Cervical back: Normal range of motion and neck supple        Right lower leg: No edema  Left lower leg: No edema  Skin:     General: Skin is warm and dry  Neurological:      General: No focal deficit present  Mental Status: She is alert and oriented to person, place, and time  Psychiatric:         Mood and Affect: Mood normal          Behavior: Behavior normal           Discussion with Family: Attempted to update  (sister) via phone  Unable to contact  Discharge instructions/Information to patient and family:   See after visit summary for information provided to patient and family  Provisions for Follow-Up Care:  See after visit summary for information related to follow-up care and any pertinent home health orders  Disposition:   Home with [facility] Services (Reminder: Complete face to face encounter)    Planned Readmission:   Discharge Medications:  See after visit summary for reconciled discharge medications provided to patient and/or family        **Please Note: This note may have been constructed using a voice recognition system**

## 2022-06-12 NOTE — DISCHARGE INSTR - AVS FIRST PAGE
Dear Wale Mclain,     It was our pleasure to care for you here at [facility], [place]  It is our hope that we were always able to exceed the expected standards for your care during your stay  You were hospitalized due to diastolic heart failure  You were cared for on the 3S floor by Mick Quiros MD under the service of Matthew Del Real MD with the [facility] who covers for your primary care physician (PCP), Karley Foreman MD, while you were hospitalized  If you have any questions or concerns related to this hospitalization, you may contact us at [phone]  For follow up as well as any medication refills, we recommend that you follow up with your primary care physician  A registered nurse will reach out to you by phone within a few days after your discharge to answer any additional questions that you may have after going home  However, at this time we provide for you here, the most important instructions / recommendations at discharge:     Notable Medication Adjustments -   Continue taking torsemide 20 mg once daily  Continue taking all your other home medications as your  Testing Required after Discharge -     Important follow up information -   Please follow-up with your PCP within 1 week  Please follow-up with Cardiology within 1-2 weeks  Other Instructions -   If he started having shortness of breath, worsening swelling a few freed please call your PCP/cardiologist as soon as possible  Please review this entire after visit summary as additional general instructions including medication list, appointments, activity, diet, any pertinent wound care, and other additional recommendations from your care team that may be provided for you        Sincerely,     Mick Quiros MD

## 2022-06-12 NOTE — ASSESSMENT & PLAN NOTE
Patient's most recent A1c 6 8    Is on Lantus 25 units at home    Plan:  · Continue home regimen  · Encourage lifestyle modification  · Upon discharge, follow-up with PCP for better diabetic control

## 2022-06-12 NOTE — ASSESSMENT & PLAN NOTE
· 6/10 evening:  IV infiltrated in right arm, removed  · Patient likely developed thrombophlebitis, has not fully resolved yet  Plan:    Will discharge with Keflex for 7 days

## 2022-06-12 NOTE — PROGRESS NOTES
Progress Note - Cardiology   Jackelyn Chavez 73 y o  female MRN: [id number]  Unit/Bed#: S -01 Encounter: [id number]    Assessment:  Principal Problem:    Acute on chronic diastolic CHF (congestive heart failure) (HCC)  Active Problems:    Paroxysmal atrial fibrillation (HCC)    Diabetes mellitus, type 2 (HCC)    KATRINA (obstructive sleep apnea)    Elevated serum creatinine    Thrombophlebitis of arm, right      Plan:    Continue 20mg torsemide daily  Monitor BMP as outpatient  She wants to establish with different cardiologist  Abbey Jeter she could also see CHF team given dCHF  PAF is stable, continue a/c with eliquis, metoprolol for rhythm control  For discharge today  She will contact the office for follow up tomorrow  Subjective/Objective     Subjective:   Denies complaints  Feels pretty well   I/O even  She thinks she was taking 20mg torsemide as outpatient    Renal function is stable, creatinine is at 1 93    Objective:    Vitals: /57 (BP Location: Right arm)   Pulse 69   Temp 97 5 °F (36 4 °C) (Oral)   Resp 20   Ht 5' 2" (1 575 m)   Wt 113 kg (248 lb 12 8 oz)   SpO2 97%   BMI 45 51 kg/m²   Vitals:    06/11/22 0544 06/12/22 0441   Weight: 113 kg (248 lb 3 8 oz) 113 kg (248 lb 12 8 oz)     Orthostatic Blood Pressures    Flowsheet Row Most Recent Value   Blood Pressure 114/57 filed at 06/12/2022 6727   Patient Position - Orthostatic VS Sitting filed at 06/12/2022 6775            Intake/Output Summary (Last 24 hours) at 6/12/2022 1109  Last data filed at 6/12/2022 0901  Gross per 24 hour   Intake 1260 ml   Output 1400 ml   Net -140 ml     Physical Exam:  GEN: Jackelyn Chavez appears well, alert and oriented x 3, pleasant and cooperative   HEENT: pupils equal, round, and reactive to light; extraocular muscles intact  NECK: supple, no carotid bruits   HEART: regular rhythm, normal S1 and S2, no murmurs, clicks, gallops or rubs   LUNGS: clear to auscultation bilaterally; no wheezes, rales, or rhonchi ABDOMEN: Obese, normal bowel sounds, soft, no tenderness, no distention  EXTREMITIES: peripheral pulses normal; no clubbing, cyanosis, or edema  NEURO: no focal findings   SKIN: normal without suspicious lesions on exposed skin    Medications:    Current Facility-Administered Medications:     acetaminophen (TYLENOL) tablet 975 mg, 975 mg, Oral, Q8H PRN, Rachelle Matthew MD, 975 mg at 06/07/22 1541    albuterol (PROVENTIL HFA,VENTOLIN HFA) inhaler 2 puff, 2 puff, Inhalation, Q6H PRN, Edison Worrell MD    apixaban [address]) tablet 5 mg, 5 mg, Oral, BID, Edison Worrell MD, 5 mg at 06/12/22 0908    docusate sodium (COLACE) capsule 100 mg, 100 mg, Oral, BID, Jeferson Tracy MD    insulin glargine (LANTUS) subcutaneous injection 25 Units 0 25 mL, 25 Units, Subcutaneous, HS, Edison Worrell MD, 25 Units at 06/11/22 2127    insulin lispro (HumaLOG) 100 units/mL subcutaneous injection 1-6 Units, 1-6 Units, Subcutaneous, HS, Edison Worrell MD, 3 Units at 06/11/22 2127    insulin lispro (HumaLOG) 100 units/mL subcutaneous injection 2-12 Units, 2-12 Units, Subcutaneous, TID AC, 6 Units at 06/12/22 0909 **AND** Fingerstick Glucose (POCT), , , TID AC, Jeferson Tracy MD    insulin lispro (HumaLOG) 100 units/mL subcutaneous injection 6 Units, 6 Units, Subcutaneous, TID With Meals, Jeferson Tracy MD, 6 Units at 06/12/22 0914    metoprolol tartrate (LOPRESSOR) tablet 50 mg, 50 mg, Oral, Q12H [address], Edison Worrell MD, 50 mg at 06/12/22 0908    montelukast (SINGULAIR) tablet 10 mg, 10 mg, Oral, Daily, Edison Worrell MD, 10 mg at 06/12/22 0908    pravastatin (PRAVACHOL) tablet 80 mg, 80 mg, Oral, Daily With Hetal Peter MD, 80 mg at 06/11/22 1709    pregabalin (LYRICA) capsule 100 mg, 100 mg, Oral, BID, Edison Worrell MD, 100 mg at 06/12/22 0908    torsemide (DEMADEX) tablet 20 mg, 20 mg, Oral, Daily, Mohit Ramos MD, 20 mg at 06/12/22 0092    Lab Results:      Results from last 7 days   Lab Units 06/11/22  6956 WBC Thousand/uL 7 01   HEMOGLOBIN g/dL 10 2*   HEMATOCRIT % 34 0*   PLATELETS Thousands/uL 119*         Results from last 7 days   Lab Units 06/12/22  0439 06/11/22  0544 06/10/22  0559   SODIUM mmol/L 138 136 137   POTASSIUM mmol/L 4 0 4 1 4 2   CHLORIDE mmol/L 95* 93* 94*   CO2 mmol/L 35* 36* 35*   BUN mg/dL 97* 98* 91*   CREATININE mg/dL 1 93* 1 94* 2 06*   CALCIUM mg/dL 8 7 8 4 8 4         Results from last 7 days   Lab Units 06/11/22  0544   MAGNESIUM mg/dL 2 3

## 2022-06-13 ENCOUNTER — TRANSITIONAL CARE MANAGEMENT (OUTPATIENT)
Dept: INTERNAL MEDICINE CLINIC | Facility: CLINIC | Age: 72
End: 2022-06-13

## 2022-06-14 ENCOUNTER — OFFICE VISIT (OUTPATIENT)
Dept: PULMONOLOGY | Facility: MEDICAL CENTER | Age: 72
End: 2022-06-14
Payer: MEDICARE

## 2022-06-14 ENCOUNTER — OFFICE VISIT (OUTPATIENT)
Dept: INTERNAL MEDICINE CLINIC | Facility: CLINIC | Age: 72
End: 2022-06-14
Payer: MEDICARE

## 2022-06-14 VITALS
RESPIRATION RATE: 12 BRPM | SYSTOLIC BLOOD PRESSURE: 154 MMHG | TEMPERATURE: 98.4 F | BODY MASS INDEX: 45.82 KG/M2 | DIASTOLIC BLOOD PRESSURE: 62 MMHG | HEIGHT: 62 IN | HEART RATE: 76 BPM | WEIGHT: 249 LBS | OXYGEN SATURATION: 94 %

## 2022-06-14 VITALS
WEIGHT: 249 LBS | BODY MASS INDEX: 45.82 KG/M2 | DIASTOLIC BLOOD PRESSURE: 76 MMHG | OXYGEN SATURATION: 98 % | SYSTOLIC BLOOD PRESSURE: 120 MMHG | HEIGHT: 62 IN | HEART RATE: 68 BPM

## 2022-06-14 DIAGNOSIS — R60.0 LOWER LEG EDEMA: ICD-10-CM

## 2022-06-14 DIAGNOSIS — Z79.01 ON CONTINUOUS ORAL ANTICOAGULATION: ICD-10-CM

## 2022-06-14 DIAGNOSIS — J96.21 ACUTE ON CHRONIC RESPIRATORY FAILURE WITH HYPOXIA AND HYPERCAPNIA (HCC): ICD-10-CM

## 2022-06-14 DIAGNOSIS — E66.01 MORBID OBESITY WITH BMI OF 45.0-49.9, ADULT (HCC): Primary | Chronic | ICD-10-CM

## 2022-06-14 DIAGNOSIS — I48.0 PAROXYSMAL ATRIAL FIBRILLATION (HCC): ICD-10-CM

## 2022-06-14 DIAGNOSIS — E11.00 TYPE 2 DIABETES MELLITUS WITH HYPEROSMOLARITY WITHOUT COMA, WITHOUT LONG-TERM CURRENT USE OF INSULIN (HCC): ICD-10-CM

## 2022-06-14 DIAGNOSIS — S42.295D OTHER CLOSED NONDISPLACED FRACTURE OF PROXIMAL END OF LEFT HUMERUS WITH ROUTINE HEALING, SUBSEQUENT ENCOUNTER: ICD-10-CM

## 2022-06-14 DIAGNOSIS — G62.9 NEUROPATHY: Primary | ICD-10-CM

## 2022-06-14 DIAGNOSIS — J96.12 CHRONIC RESPIRATORY FAILURE WITH HYPOXIA AND HYPERCAPNIA (HCC): Chronic | ICD-10-CM

## 2022-06-14 DIAGNOSIS — J96.11 CHRONIC RESPIRATORY FAILURE WITH HYPOXIA AND HYPERCAPNIA (HCC): Chronic | ICD-10-CM

## 2022-06-14 DIAGNOSIS — M85.812 OTHER SPECIFIED DISORDERS OF BONE DENSITY AND STRUCTURE, LEFT SHOULDER: ICD-10-CM

## 2022-06-14 DIAGNOSIS — S42.292D CLOSED FRACTURE OF HEAD OF LEFT HUMERUS WITH ROUTINE HEALING, SUBSEQUENT ENCOUNTER: ICD-10-CM

## 2022-06-14 DIAGNOSIS — I80.8 THROMBOPHLEBITIS OF ARM, RIGHT: ICD-10-CM

## 2022-06-14 DIAGNOSIS — I50.33 ACUTE ON CHRONIC DIASTOLIC CHF (CONGESTIVE HEART FAILURE) (HCC): ICD-10-CM

## 2022-06-14 DIAGNOSIS — D64.9 ANEMIA, UNSPECIFIED TYPE: ICD-10-CM

## 2022-06-14 DIAGNOSIS — J96.22 ACUTE ON CHRONIC RESPIRATORY FAILURE WITH HYPOXIA AND HYPERCAPNIA (HCC): ICD-10-CM

## 2022-06-14 DIAGNOSIS — R91.8 PULMONARY NODULES: ICD-10-CM

## 2022-06-14 DIAGNOSIS — E78.2 MIXED HYPERLIPIDEMIA: ICD-10-CM

## 2022-06-14 DIAGNOSIS — D69.6 PLATELETS DECREASED (HCC): ICD-10-CM

## 2022-06-14 DIAGNOSIS — E66.2 OBESITY HYPOVENTILATION SYNDROME (HCC): ICD-10-CM

## 2022-06-14 DIAGNOSIS — E66.01 MORBID OBESITY (HCC): ICD-10-CM

## 2022-06-14 DIAGNOSIS — J45.909 MILD ASTHMA WITHOUT COMPLICATION, UNSPECIFIED WHETHER PERSISTENT: ICD-10-CM

## 2022-06-14 DIAGNOSIS — I10 ESSENTIAL HYPERTENSION: ICD-10-CM

## 2022-06-14 DIAGNOSIS — G47.33 OSA (OBSTRUCTIVE SLEEP APNEA): ICD-10-CM

## 2022-06-14 PROBLEM — S42.202D CLOSED FRACTURE OF PROXIMAL END OF LEFT HUMERUS WITH ROUTINE HEALING: Status: ACTIVE | Noted: 2022-03-01

## 2022-06-14 PROBLEM — I48.91 ATRIAL FIBRILLATION WITH RAPID VENTRICULAR RESPONSE (HCC): Status: RESOLVED | Noted: 2022-03-28 | Resolved: 2022-06-14

## 2022-06-14 PROBLEM — N17.9 AKI (ACUTE KIDNEY INJURY) (HCC): Status: RESOLVED | Noted: 2022-02-28 | Resolved: 2022-06-14

## 2022-06-14 PROCEDURE — 99214 OFFICE O/P EST MOD 30 MIN: CPT | Performed by: NURSE PRACTITIONER

## 2022-06-14 PROCEDURE — 99215 OFFICE O/P EST HI 40 MIN: CPT | Performed by: INTERNAL MEDICINE

## 2022-06-14 RX ORDER — CHOLECALCIFEROL (VITAMIN D3) 10 MCG
1 TABLET ORAL DAILY
Qty: 30 CAPSULE | Refills: 5 | Status: SHIPPED | OUTPATIENT
Start: 2022-06-14 | End: 2022-07-20

## 2022-06-14 RX ORDER — PREGABALIN 100 MG/1
100 CAPSULE ORAL 2 TIMES DAILY
Qty: 180 CAPSULE | Refills: 1 | Status: SHIPPED | OUTPATIENT
Start: 2022-06-14

## 2022-06-14 NOTE — PROGRESS NOTES
Assessment/Plan:     Problem List Items Addressed This Visit        Respiratory    Chronic respiratory failure with hypoxia and hypercapnia (HCC) (Chronic)     Patient apparently was given a BiPAP previously for hypercapnia  Patient did have ABGs done during hospitalization May 18, 2022  At that time her pCO2 was 62 8  She was given a BiPAP  Patient reports that she has been able to use on a consistent basis due to her frequent hospitalizations  He is going to try to use once again  I have instructed for patient to try to begin to uses at least 4 hours a day even in the daytime  She is willing to do so  The goal is for her to increase usage then tonight time  Patient was given a BiPAP 12/6 during her last hospitalization in May of 2022  She is aware that oxygen was not ordered a 30% is a nocturnal pulse oximetry needs to be done  I will order this so that the next time she is able to use BiPAP successfully if possible for 4 hour she will use a pulse oximeter at that point              Other    Morbid obesity with BMI of 45 0-49 9, adult ([facility] ) - Primary (Chronic)     Patient has history of alveolar hypoventilation obesity syndrome  She also has likely restrictive impairment on her PFT  PFT was done on May 18, 2022  Forced vital capacity was 0 99 L or 36% of predicted, FEV1 was 0 72 L or 34% of predicted and obstruction ratio was 73%  Flow volume loop was restricted  Patient currently is using 2 L of supplemental oxygen at night for this syndrome  She continues to use an benefit                   No follow-ups on file  All questions are answered to the patient's satisfaction and understanding  She verbalizes understanding  She is encouraged to call with any further questions or concerns  Portions of the record may have been created with voice recognition software  Occasional wrong word or "sound a like" substitutions may have occurred due to the inherent limitations of voice recognition software  Read the chart carefully and recognize, using context, where substitutions have occurred  Electronically Signed by RK Somers    ______________________________________________________________________    Chief Complaint: No chief complaint on file  Patient ID: Kraig Ganser is a 73 y o  y o  female has a past medical history of Anemia, Asthma, CHF (congestive heart failure) ([facility] ), COVID-19, GERD (gastroesophageal reflux disease), Hyperlipidemia, Kidney stones, PONV (postoperative nausea and vomiting), and SVT (supraventricular tachycardia) ([facility] )  6/14/2022  Patient presents today for follow-up visit  HPI  Sharonda Granados is a 70-year-old female who was recently hospitalized from June 4th to June 12, 2022  This patient had shortness of breath and had acute on chronic diastolic congestive heart failure  She also has history of obstructive sleep apnea  Patient has a history of mild sleep disordered breathing although her AHI was 2 1 events per hour  Patient has acute on chronic respiratory failure with both hypoxia and hypercapnia  Apparently she was given a BiPAP for hypercapnia previously  She also has obesity hypoventilation syndrome for which her previous sleep study was negative  Apparently she does have a home BiPAP  Patient did have a [facility]neaLink in May of 2019 for which results were 5  She also had oxygen flow below 80 6% for 192 minutes  According to patient she does have a BiPAP at has been unable to use  Patient was also hospitalized May 16th to May 23, 2022  This was for acute on chronic respiratory failure with hypoxia and hypercapnia as well as acute on chronic congestive heart failure  Her ABGs on room air showed pH is 7 49 pCO2 of 62  She was discharged with BiPAP for which she is now using BiPAP 12/6 with 30% oxygen  Review of Systems   Constitutional: Negative  HENT: Negative  Eyes: Negative  Respiratory: Positive for shortness of breath  Cardiovascular: Negative  Gastrointestinal: Negative  Endocrine: Negative  Genitourinary: Negative  Musculoskeletal: Negative  Skin: Negative  Allergic/Immunologic: Negative  Hematological: Negative  Psychiatric/Behavioral: Negative  Smoking history: She reports that she quit smoking about 25 years ago  Her smoking use included cigarettes  She has a 20 00 pack-year smoking history  She has never used smokeless tobacco     The following portions of the patient's history were reviewed and updated as appropriate: current medications, past family history, past medical history, past social history, past surgical history and problem list     Immunization History   Administered Date(s) Administered    COVID-19 MODERNA VACC 0 5 ML IM 02/11/2022     Current Outpatient Medications   Medication Sig Dispense Refill    acetaminophen (TYLENOL) 325 mg tablet Take 650 mg by mouth every 6 (six) hours as needed for mild pain        albuterol (PROVENTIL HFA,VENTOLIN HFA) 90 mcg/act inhaler Inhale 2 puffs every 6 (six) hours as needed for wheezing 8 g 1    ammonium lactate (LAC-HYDRIN) 12 % lotion APPLY TOPICALLY TO AFFECTED AREAS twice a day      apixaban (ELIQUIS) 5 mg Take 1 tablet (5 mg total) by mouth in the morning and 1 tablet (5 mg total) in the evening  180 tablet 1    cephalexin (KEFLEX) 500 mg capsule Take 1 capsule (500 mg total) by mouth every 8 (eight) hours for 7 days 21 capsule 0    docusate sodium (COLACE) 100 mg capsule Take 100 mg by mouth in the morning      glucose blood (OneTouch Ultra) test strip Test twice daily   100 strip 2    insulin lispro (HumaLOG KwikPen) 100 units/mL injection pen 3 times with meal according to sliding scale - >Blood Glucose 150 - 199: 2 Unit of Insulin, Blood Glucose 200 - 249: 4 Units of Insulin, Blood Glucose 250 - 299: 6 Units of Insulin, Blood Glucose 300 - 349: 8 Units of Insulin, Blood Glucose 350 - 399: 10 Units of [place],Blood Glucose greater than or equal to 400: 12 Units of Insulin-please contact your physician 15 mL 0    Insulin Pen Needle (BD Pen Needle Pilar 2nd Gen) 32G X 4 MM MISC For use with insulin pen  Pharmacy may dispense brand covered by insurance  100 each 0    Insulin Pen Needle (NovoFine Autocover) 30G X 8 MM MISC Inject under the skin daily 100 each 3    Insulin Pen Needle 30G X 8 MM MISC 2 (two) times a day      Lancets (onetouch ultrasoft) lancets Use once daily 100 each 2    metoprolol tartrate (LOPRESSOR) 50 mg tablet Take 1 tablet (50 mg total) by mouth every 12 (twelve) hours 180 tablet 1    montelukast (SINGULAIR) 10 mg tablet Take 10 mg by mouth daily        nystatin (MYCOSTATIN) powder Apply topically 2 (two) times a day      pregabalin (LYRICA) 100 mg capsule Take 100 mg by mouth 2 (two) times a day       rosuvastatin (CRESTOR) 10 MG tablet 10 mg daily    0    sodium chloride (OCEAN) 0 65 % nasal spray 1 spray into each nostril every hour as needed for congestion  0    torsemide (DEMADEX) 20 mg tablet Take 1 tablet (20 mg total) by mouth every morning 90 tablet 1    Toujeo SoloStar 300 units/mL CONCENTRATED U-300 injection pen (1-unit dial) Inject 25 Units under the skin daily at bedtime 4 5 mL 0    Trulicity 1 5 MH/0 2WT SOPN inject 0 5 milliliter subcutaneously every week 28      b complex-vitamin C-folic acid (NEPHROCAPS) 1 mg capsule Take 1 capsule by mouth daily 30 capsule 5     No current facility-administered medications for this visit  Allergies: Penicillins, Moxifloxacin, Percocet [oxycodone-acetaminophen], Zinc acetate, Asa [aspirin], Indocin [indomethacin], and Other    Objective:  Vitals:    06/14/22 1353   BP: 154/62   Pulse: 76   Resp: 12   Temp: 98 4 °F (36 9 °C)   TempSrc: Temporal   SpO2: 94%   Weight: 113 kg (249 lb)   Height: 5' 2" (1 575 m)   Oxygen Therapy  SpO2: 94 %      Wt Readings from Last 3 Encounters:   06/14/22 113 kg (249 lb)   06/12/22 113 kg (248 lb 12 8 oz)   05/31/22 116 kg (255 lb)     Body mass index is 45 54 kg/m²  Physical Exam  Constitutional:       Appearance: She is obese  HENT:      Head: Normocephalic and atraumatic  Nose: Nose normal       Mouth/Throat:      Mouth: Mucous membranes are dry  Eyes:      Pupils: Pupils are equal, round, and reactive to light  Cardiovascular:      Rate and Rhythm: Normal rate and regular rhythm  Pulses: Normal pulses  Pulmonary:      Effort: Pulmonary effort is normal       Breath sounds: Normal breath sounds  Abdominal:      General: Abdomen is flat  Musculoskeletal:         General: Normal range of motion  Cervical back: Normal range of motion  Skin:     General: Skin is warm and dry  Capillary Refill: Capillary refill takes less than 2 seconds  Neurological:      General: No focal deficit present  Mental Status: She is alert and oriented to person, place, and time     Psychiatric:         Mood and Affect: Mood normal          Lab Review:   Admission on 06/04/2022, Discharged on 06/12/2022   Component Date Value    WBC 06/04/2022 8 08     RBC 06/04/2022 3 90     Hemoglobin 06/04/2022 11 0 (A)    Hematocrit 06/04/2022 35 8     MCV 06/04/2022 92     MCH 06/04/2022 28 2     MCHC 06/04/2022 30 7 (A)    RDW 06/04/2022 14 5     MPV 06/04/2022 11 2     Platelets 02/72/3648 181     nRBC 06/04/2022 0     Neutrophils Relative 06/04/2022 58     Immat GRANS % 06/04/2022 0     Lymphocytes Relative 06/04/2022 25     Monocytes Relative 06/04/2022 10     Eosinophils Relative 06/04/2022 6     Basophils Relative 06/04/2022 1     Neutrophils Absolute 06/04/2022 4 66     Immature Grans Absolute 06/04/2022 0 03     Lymphocytes Absolute 06/04/2022 2 05     Monocytes Absolute 06/04/2022 0 77     Eosinophils Absolute 06/04/2022 0 50     Basophils Absolute 06/04/2022 0 07     Protime 06/04/2022 15 7 (A)    INR 06/04/2022 1 25 (A)    PTT 06/04/2022 31     Sodium 06/04/2022 139     Potassium 06/04/2022 4 5     Chloride 06/04/2022 98     CO2 06/04/2022 32     ANION GAP 06/04/2022 9     BUN 06/04/2022 69 (A)    Creatinine 06/04/2022 1 62 (A)    Glucose 06/04/2022 196 (A)    Calcium 06/04/2022 8 8     AST 06/04/2022 15     ALT 06/04/2022 12     Alkaline Phosphatase 06/04/2022 61     Total Protein 06/04/2022 7 6     Albumin 06/04/2022 3 8     Total Bilirubin 06/04/2022 0 37     eGFR 06/04/2022 32     BNP 06/04/2022 74     hs TnI 0hr 06/04/2022 6     hs TnI 2hr 06/04/2022 6     Delta 2hr hsTnI 06/04/2022 0     hs TnI 4hr 06/04/2022 7     Delta 4hr hsTnI 06/04/2022 1     POC Glucose 06/04/2022 182 (A)    POC Glucose 06/04/2022 206 (A)    Sodium 06/05/2022 141     Potassium 06/05/2022 4 3     Chloride 06/05/2022 100     CO2 06/05/2022 36 (A)    ANION GAP 06/05/2022 5     BUN 06/05/2022 68 (A)    Creatinine 06/05/2022 1 64 (A)    Glucose 06/05/2022 224 (A)    Calcium 06/05/2022 8 6     eGFR 06/05/2022 31     Magnesium 06/05/2022 2 0     Ventricular Rate 06/04/2022 75     Atrial Rate 06/04/2022 78     ID Interval 06/04/2022 192     QRSD Interval 06/04/2022 70     QT Interval 06/04/2022 362     QTC Interval 06/04/2022 405     P Axis 06/04/2022 63     QRS Axis 06/04/2022 -36     T Wave Axis 06/04/2022 33     POC Glucose 06/05/2022 215 (A)    POC Glucose 06/05/2022 269 (A)    POC Glucose 06/05/2022 222 (A)    POC Glucose 06/05/2022 288 (A)    Sodium 06/06/2022 140     Potassium 06/06/2022 4 2     Chloride 06/06/2022 97     CO2 06/06/2022 36 (A)    ANION GAP 06/06/2022 7     BUN 06/06/2022 66 (A)    Creatinine 06/06/2022 1 46 (A)    Glucose 06/06/2022 151 (A)    Calcium 06/06/2022 8 4     eGFR 06/06/2022 36     POC Glucose 06/06/2022 171 (A)    POC Glucose 06/06/2022 300 (A)    POC Glucose 06/06/2022 231 (A)    POC Glucose 06/06/2022 206 (A)    Sodium 06/07/2022 139     Potassium 06/07/2022 4 4     Chloride 06/07/2022 96     CO2 06/07/2022 37 (A)    ANION GAP 06/07/2022 6     BUN 06/07/2022 70 (A)    Creatinine 06/07/2022 1 52 (A)    Glucose 06/07/2022 124     Calcium 06/07/2022 8 6     eGFR 06/07/2022 34     POC Glucose 06/07/2022 128     POC Glucose 06/07/2022 159 (A)    POC Glucose 06/07/2022 165 (A)    POC Glucose 06/07/2022 240 (A)    Sodium 06/08/2022 137     Potassium 06/08/2022 3 9     Chloride 06/08/2022 95 (A)    CO2 06/08/2022 35 (A)    ANION GAP 06/08/2022 7     BUN 06/08/2022 82 (A)    Creatinine 06/08/2022 1 71 (A)    Glucose 06/08/2022 190 (A)    Calcium 06/08/2022 8 7     eGFR 06/08/2022 30     POC Glucose 06/08/2022 170 (A)    POC Glucose 06/08/2022 248 (A)    POC Glucose 06/08/2022 240 (A)    POC Glucose 06/08/2022 203 (A)    Sodium 06/09/2022 137     Potassium 06/09/2022 4 2     Chloride 06/09/2022 94 (A)    CO2 06/09/2022 35 (A)    ANION GAP 06/09/2022 8     BUN 06/09/2022 82 (A)    Creatinine 06/09/2022 1 50 (A)    Glucose 06/09/2022 188 (A)    Calcium 06/09/2022 8 8     eGFR 06/09/2022 35     POC Glucose 06/09/2022 190 (A)    POC Glucose 06/09/2022 288 (A)    POC Glucose 06/09/2022 257 (A)    POC Glucose 06/09/2022 220 (A)    POC Glucose 06/09/2022 249 (A)    Sodium 06/10/2022 137     Potassium 06/10/2022 4 2     Chloride 06/10/2022 94 (A)    CO2 06/10/2022 35 (A)    ANION GAP 06/10/2022 8     BUN 06/10/2022 91 (A)    Creatinine 06/10/2022 2 06 (A)    Glucose 06/10/2022 128     Calcium 06/10/2022 8 4     eGFR 06/10/2022 24     POC Glucose 06/10/2022 161 (A)    POC Glucose 06/10/2022 260 (A)    POC Glucose 06/10/2022 267 (A)    Blood Culture 06/10/2022 No Growth at 72 hrs   Blood Culture 06/10/2022 No Growth at 72 hrs       POC Glucose 06/10/2022 277 (A)    Sodium 06/11/2022 136     Potassium 06/11/2022 4 1     Chloride 06/11/2022 93 (A)    CO2 06/11/2022 36 (A)    ANION GAP 06/11/2022 7     BUN 06/11/2022 98 (A)    Creatinine 06/11/2022 1 94 (A)    Glucose 06/11/2022 180 (A)    Calcium 06/11/2022 8 4     eGFR 06/11/2022 25     Magnesium 06/11/2022 2 3     WBC 06/11/2022 7 01     RBC 06/11/2022 3 71 (A)    Hemoglobin 06/11/2022 10 2 (A)    Hematocrit 06/11/2022 34 0 (A)    MCV 06/11/2022 92     MCH 06/11/2022 27 5     MCHC 06/11/2022 30 0 (A)    RDW 06/11/2022 15 1     MPV 06/11/2022 11 5     Platelets 34/35/0510 119 (A)    nRBC 06/11/2022 0     Neutrophils Relative 06/11/2022 51     Immat GRANS % 06/11/2022 0     Lymphocytes Relative 06/11/2022 28     Monocytes Relative 06/11/2022 14 (A)    Eosinophils Relative 06/11/2022 6     Basophils Relative 06/11/2022 1     Neutrophils Absolute 06/11/2022 3 54     Immature Grans Absolute 06/11/2022 0 03     Lymphocytes Absolute 06/11/2022 1 94     Monocytes Absolute 06/11/2022 1 00     Eosinophils Absolute 06/11/2022 0 45     Basophils Absolute 06/11/2022 0 05     POC Glucose 06/11/2022 166 (A)    POC Glucose 06/11/2022 314 (A)    POC Glucose 06/11/2022 154 (A)    POC Glucose 06/11/2022 235 (A)    Sodium 06/12/2022 138     Potassium 06/12/2022 4 0     Chloride 06/12/2022 95 (A)    CO2 06/12/2022 35 (A)    ANION GAP 06/12/2022 8     BUN 06/12/2022 97 (A)    Creatinine 06/12/2022 1 93 (A)    Glucose 06/12/2022 184 (A)    Calcium 06/12/2022 8 7     eGFR 06/12/2022 26     POC Glucose 06/12/2022 202 (A)    POC Glucose 06/12/2022 267 (A)    POC Glucose 06/12/2022 264 (A)   Admission on 05/31/2022, Discharged on 05/31/2022   Component Date Value    NT-proBNP 05/31/2022 1,859 (A)    hs TnI 0hr 05/31/2022 8     Sodium 05/31/2022 138     Potassium 05/31/2022 5 3     Chloride 05/31/2022 102     CO2 05/31/2022 31     ANION GAP 05/31/2022 5     BUN 05/31/2022 64 (A)    Creatinine 05/31/2022 1 79 (A)    Glucose 05/31/2022 246 (A)    Calcium 05/31/2022 8 3     Corrected Calcium 05/31/2022 9 0     AST 05/31/2022 27     ALT 05/31/2022 20     Alkaline Phosphatase 05/31/2022 64     Total Protein 05/31/2022 7 3     Albumin 05/31/2022 3 1 (A)    Total Bilirubin 05/31/2022 0 26     eGFR 05/31/2022 28     Ventricular Rate 05/31/2022 69     Atrial Rate 05/31/2022 69     [place] Interval 05/31/2022 192     QRSD Interval 05/31/2022 80     QT Interval 05/31/2022 400     QTC Interval 05/31/2022 428     P Axis 05/31/2022 5     QRS Axis 05/31/2022 -18     T Wave [place] 05/31/2022 24    Appointment on 05/31/2022   Component Date Value    WBC 05/31/2022 7 28     RBC 05/31/2022 3 67 (A)    Hemoglobin 05/31/2022 10 3 (A)    Hematocrit 05/31/2022 35 2     MCV 05/31/2022 96     MCH 05/31/2022 28 1     MCHC 05/31/2022 29 3 (A)    RDW 05/31/2022 14 5     Platelets 46/12/7005 195     MPV 05/31/2022 11 9     Sodium 05/31/2022 136     Potassium 05/31/2022 5 3     Chloride 05/31/2022 104     CO2 05/31/2022 25     ANION GAP 05/31/2022 7     BUN 05/31/2022 59 (A)    Creatinine 05/31/2022 1 90 (A)    Glucose 05/31/2022 241 (A)    Calcium 05/31/2022 9 2     Corrected Calcium 05/31/2022 9 8     AST 05/31/2022 31     ALT 05/31/2022 21     Alkaline Phosphatase 05/31/2022 73     Total Protein 05/31/2022 7 8     Albumin 05/31/2022 3 3 (A)    Total Bilirubin 05/31/2022 0 30     eGFR 05/31/2022 26    No results displayed because visit has over 200 results        Admission on 05/13/2022, Discharged on 05/13/2022   Component Date Value    WBC 05/13/2022 8 30     RBC 05/13/2022 3 30 (A)    Hemoglobin 05/13/2022 9 2 (A)    Hematocrit 05/13/2022 31 4 (A)    MCV 05/13/2022 95     MCH 05/13/2022 27 9     MCHC 05/13/2022 29 3 (A)    RDW 05/13/2022 14 3     MPV 05/13/2022 10 5     Platelets 75/28/0742 174     nRBC 05/13/2022 0     Neutrophils Relative 05/13/2022 58     Immat GRANS % 05/13/2022 0     Lymphocytes Relative 05/13/2022 26     Monocytes Relative 05/13/2022 10     Eosinophils Relative 05/13/2022 5     Basophils Relative 05/13/2022 1     Neutrophils Absolute 05/13/2022 4 87     Immature Grans Absolute 05/13/2022 0 03     Lymphocytes Absolute 05/13/2022 2 12     Monocytes Absolute 05/13/2022 0 84     Eosinophils Absolute 05/13/2022 0 38     Basophils Absolute 05/13/2022 0 06     Protime 05/13/2022 14 8 (A)    INR 05/13/2022 1 18     PTT 05/13/2022 33     Sodium 05/13/2022 141     Potassium 05/13/2022 4 4     Chloride 05/13/2022 99 (A)    CO2 05/13/2022 37 (A)    ANION GAP 05/13/2022 5     BUN 05/13/2022 36 (A)    Creatinine 05/13/2022 1 49 (A)    Glucose 05/13/2022 282 (A)    Calcium 05/13/2022 8 0 (A)    Corrected Calcium 05/13/2022 8 7     AST 05/13/2022 10     ALT 05/13/2022 15     Alkaline Phosphatase 05/13/2022 91     Total Protein 05/13/2022 7 4     Albumin 05/13/2022 3 1 (A)    Total Bilirubin 05/13/2022 0 19 (A)    eGFR 05/13/2022 35     Magnesium 05/13/2022 1 8     hs TnI 0hr 05/13/2022 9     NT-proBNP 05/13/2022 1,548 (A)    hs TnI 2hr 05/13/2022 10     Delta 2hr hsTnI 05/13/2022 1     Color, UA 05/13/2022 Yellow     Clarity, UA 05/13/2022 Clear     Specific Gravity, UA 05/13/2022 1 025     pH, UA 05/13/2022 5 5     Leukocytes, UA 05/13/2022 Trace (A)    Nitrite, UA 05/13/2022 Negative     Protein, UA 05/13/2022 30 (1+) (A)    Glucose, UA 05/13/2022 Negative     Ketones, UA 05/13/2022 Negative     Urobilinogen, UA 05/13/2022 0 2     Bilirubin, UA 05/13/2022 Negative     Blood, UA 05/13/2022 Trace-Intact (A)    RBC, UA 05/13/2022 1-2     WBC, UA 05/13/2022 20-30 (A)    Epithelial Cells 05/13/2022 Moderate (A)    Bacteria, UA 05/13/2022 Occasional     MUCUS THREADS 05/13/2022 Occasional (A)    Urine Culture 05/13/2022 50,000-59,000 cfu/ml      Ventricular Rate 05/13/2022 85     Atrial Rate 05/13/2022 85     [place] Interval 05/13/2022 186     QRSD Interval 05/13/2022 72     QT Interval 05/13/2022 372     QTC Interval 05/13/2022 442     P Axis 05/13/2022 56     QRS Axis 05/13/2022 8     T Wave [place] 05/13/2022 27    Appointment on 04/12/2022   Component Date Value    Urine Culture 04/12/2022 50,000-59,000 cfu/ml     Lab on 04/12/2022   Component Date Value    WBC 04/12/2022 8 74     RBC 04/12/2022 3 42 (A)    Hemoglobin 04/12/2022 9 6 (A)    Hematocrit 04/12/2022 33 2 (A)    MCV 04/12/2022 97     MCH 04/12/2022 28 1     MCHC 04/12/2022 28 9 (A)    RDW 04/12/2022 15 7 (A)    MPV 04/12/2022 10 8     Platelets 66/07/6427 212     nRBC 04/12/2022 0     Neutrophils Relative 04/12/2022 60     Immat GRANS % 04/12/2022 1     Lymphocytes Relative 04/12/2022 23     Monocytes Relative 04/12/2022 9     Eosinophils Relative 04/12/2022 5     Basophils Relative 04/12/2022 2 (A)    Neutrophils Absolute 04/12/2022 5 28     Immature Grans Absolute 04/12/2022 0 06     Lymphocytes Absolute 04/12/2022 2 03     Monocytes Absolute 04/12/2022 0 80     Eosinophils Absolute 04/12/2022 0 43     Basophils Absolute 04/12/2022 0 14 (A)    Sodium 04/12/2022 137     Potassium 04/12/2022 5 1     Chloride 04/12/2022 104     CO2 04/12/2022 29     ANION GAP 04/12/2022 4     BUN 04/12/2022 24     Creatinine 04/12/2022 1 12     Glucose 04/12/2022 195 (A)    Calcium 04/12/2022 9 0     Corrected Calcium 04/12/2022 9 8     AST 04/12/2022 25     ALT 04/12/2022 18     Alkaline Phosphatase 04/12/2022 76     Total Protein 04/12/2022 7 4     Albumin 04/12/2022 3 0 (A)    Total Bilirubin 04/12/2022 0 18 (A)    eGFR 04/12/2022 50    Orders Only on 04/12/2022   Component Date Value    Color, UA 04/12/2022 Colorless     Clarity, UA 04/12/2022 Clear     Specific Gravity, UA 04/12/2022 1 009     pH, UA 04/12/2022 5 5     Leukocytes, UA 04/12/2022 Negative     Nitrite, UA 04/12/2022 Negative     Protein, UA 04/12/2022 Negative     Glucose, UA 04/12/2022 Negative     Ketones, UA 04/12/2022 Negative     Urobilinogen, UA 04/12/2022 <2 0     Bilirubin, UA 04/12/2022 Negative     Blood, UA 04/12/2022 Negative    Lab on 04/06/2022   Component Date Value    Sodium 04/06/2022 141     Potassium 04/06/2022 4 8     Chloride 04/06/2022 104     CO2 04/06/2022 34 (A)    ANION GAP 04/06/2022 3 (A)    BUN 04/06/2022 25     Creatinine 04/06/2022 1 25     Glucose 04/06/2022 173 (A)    Calcium 04/06/2022 8 9     Corrected Calcium 04/06/2022 9 9     AST 04/06/2022 14     ALT 04/06/2022 15     Alkaline Phosphatase 04/06/2022 69     Total Protein 04/06/2022 7 2     Albumin 04/06/2022 2 8 (A)    Total Bilirubin 04/06/2022 0 40     eGFR 04/06/2022 43     WBC 04/06/2022 8 60     RBC 04/06/2022 3 19 (A)    Hemoglobin 04/06/2022 8 9 (A)    Hematocrit 04/06/2022 31 2 (A)    MCV 04/06/2022 98     MCH 04/06/2022 27 9     MCHC 04/06/2022 28 5 (A)    RDW 04/06/2022 15 5 (A)    MPV 04/06/2022 10 7     Platelets 15/90/5454 330     Segmented % 04/06/2022 26 (A)    Bands % 04/06/2022 11 (A)    Lymphocytes % 04/06/2022 39     Monocytes % 04/06/2022 9     Eosinophils, % 04/06/2022 11 (A)    Basophils % 04/06/2022 0     Atypical Lymphocytes % 04/06/2022 4 (A)    Absolute Neutrophils 04/06/2022 3 18     Lymphocytes Absolute 04/06/2022 3 35     Monocytes Absolute 04/06/2022 0 77     Eosinophils Absolute 04/06/2022 0 95 (A)    Basophils Absolute 04/06/2022 0 00     Toxic Granulation 04/06/2022 Present     RBC Morphology 04/06/2022 Present     Anisocytosis 04/06/2022 Present     Polychromasia 04/06/2022 Present     Platelet Estimate 36/92/1200 Adequate     Artifact 04/06/2022 Present    No results displayed because visit has over 200 results  There may be more visits with results that are not included         Past Surgical History:   Procedure Laterality Date    APPENDECTOMY      CYSTOSCOPY W/ LASER LITHOTRIPSY Left 7/12/2016    Procedure: CYSTOSCOPY URETEROSCOPY WITH LITHOTRIPSY HOLMIUM LASER, RETROGRADE PYELOGRAM AND INSERTION STENT URETERAL;  Surgeon: Vel Mendoza MD;  Location: [address] [place] [address];  Service:    Professor Joselito Rosario [address] IR THORACENTESIS  3/18/2022    JOINT REPLACEMENT      right knee    KNEE ARTHROPLASTY Right     GA CYSTOURETHROSCOPY,URETER CATHETER Left 6/29/2016    Procedure: CYSTOSCOPY RETROGRADE PYELOGRAM WITH INSERTION STENT URETERAL, left;  Surgeon: Andrew Elder MD;  Location: [address] [place] [address];  Service: Urology    GA RECONSTR TOTAL SHOULDER IMPLANT Left 3/1/2022    Procedure: ARTHROPLASTY SHOULDER REVERSE;  Surgeon: Sada Dutta MD;  Location: [facility];  Service: Orthopedics    SHOULDER ARTHROTOMY Left         Family History   Problem Relation Age of Onset    Heart disease Mother     Emphysema Maternal Grandmother     Heart disease Family         Diagnostics:  I have personally reviewed pertinent films in PACS    Office Spirometry Results:     ESS:    XR chest 1 view portable    Result Date: 5/31/2022  Narrative: CHEST INDICATION:   shortness of breath  COMPARISON:  5/16/2022 EXAM PERFORMED/VIEWS:  XR CHEST PORTABLE FINDINGS: Peribronchial cuffing is seen bilaterally  There is no acute infiltrate with no pneumothorax  Small amount of bilateral particularly right-sided pleural effusion is suggested  Atherosclerotic aorta is visualized associated with unchanged cardiac silhouette  Improving fullness of both hilum is visualized suggesting improving CHF  Degenerative changes is seen within the spine associated with partially imaged left shoulder prosthesis  Impression: Improving aeration compatible with improving CHF No acute infiltrate identified Workstation performed: [id number]     XR chest 1 view portable    Result Date: 5/16/2022  Narrative: CHEST INDICATION:   shortness of breath, respiratory distress  COMPARISON:  5/13/2022 EXAM PERFORMED/VIEWS:  XR CHEST PORTABLE FINDINGS: Cardiomediastinal silhouette appears mildly enlarged with mild increased vascular congestion and increased bilateral pleural effusions  No pneumothorax  Left shoulder arthroplasty again noted       Impression: Mild cardiomegaly Mild increased vascular congestion Increased bilateral pleural effusions Workstation performed: [id number]     XR chest 2 views    Result Date: 6/5/2022  Narrative: CHEST INDICATION:   SOB  COMPARISON:  Chest radiograph from 5/31/2022 and chest CT from 3/29/2022  EXAM PERFORMED/VIEWS:  XR CHEST PA & LATERAL FINDINGS: Cardiomediastinal silhouette appears unremarkable  The lungs are clear  No pneumothorax or pleural effusion  Reverse left shoulder arthroplasty  Impression: No acute cardiopulmonary disease  Workstation performed: [id number]     CT head without contrast    Result Date: 5/16/2022  Narrative: CT BRAIN - WITHOUT CONTRAST INDICATION:   Mental status change, unknown cause altered mental status  COMPARISON:  2/17/2022 TECHNIQUE:  CT examination of the brain was performed  In addition to axial images, sagittal and coronal 2D reformatted images were created and submitted for interpretation  Radiation dose length product (DLP) for this visit:  912 34 mGy-cm   This examination, like all CT scans performed in the [facility], was performed utilizing techniques to minimize radiation dose exposure, including the use of iterative  reconstruction and automated exposure control  IMAGE QUALITY:  Diagnostic  FINDINGS: PARENCHYMA: Decreased attenuation is noted in periventricular and subcortical white matter demonstrating an appearance that is statistically most likely to represent mild microangiopathic change  No CT signs of acute infarction  No intracranial mass, mass effect or midline shift  No acute parenchymal hemorrhage  VENTRICLES AND EXTRA-AXIAL SPACES:  Normal for the patient's age  VISUALIZED ORBITS AND PARANASAL SINUSES:  There is a left mastoid effusion, not present previously  There is mild left sphenoid sinus and ethmoid air cell mucosal thickening  CALVARIUM AND EXTRACRANIAL SOFT TISSUES:  Normal      Impression: 1  No acute intracranial abnormality  2   New left mastoid effusion    Correlation for acute mastoiditis recommended  Workstation performed: [id number]     Bedside spirometry with bronchodilator Pre/Post    Result Date: 2022  Narrative: Pulmonary Function Test Report Lata Blanco 73 y o  female MRN: [id number] Date of Testin2022 Date of Interpretation: 2022 Requesting Physician: Dr Nikos Keller Reason for Testing: SOB Reference set for interpretation:  Procedure: Testing was completed bedside  The patient demonstrated good effort and cooperation  The results of this test meet ATS standards for acceptability and repeatability  Data set appears appropriate for interpretation  Post bronchodilator testing performed after the administration of 2 5mg albuterol in 3cc normal saline administered via nebulizer per bronchodilator protocol  Results: FEV1/FVC Ratio: 73 % Forced Vital Capacity: 0 99 L 36 % predicted FEV1: 0 72 L 34 % predicted Interpretation: · Spirometry suggests abnormal lung volumes  Recommend checking lung volumes for further evaluation  · No significant response to the administration to bronchodilator per ATS standards · Flow volume loop is restricted  EVETTE [facility]  [facility]

## 2022-06-14 NOTE — ASSESSMENT & PLAN NOTE
05/27/2022:  Pulmonary function test:FEV1/FVC Ratio: 73 % Forced Vital Capacity: 0 99 L 36 % predicted FEV1: 0 72 L 34 % predicted Interpretation: · Spirometry suggests abnormal lung volumes  Recommend checking lung volumes for further evaluation  · No significant response to the administration to bronchodilator per ATS standards · Flow volume loop is restricted

## 2022-06-14 NOTE — ASSESSMENT & PLAN NOTE
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

## 2022-06-14 NOTE — PROGRESS NOTES
Diabetic Foot Exam    Patient's shoes and socks removed  Right Foot/Ankle   Right Foot Inspection  Skin Exam: skin intact  No blister, no callus, no erythema, no maceration, no ulcer and no callus  Toe Exam: right toe deformity  Sensory   Proprioception: intact  Monofilament testing: intact    Vascular  Capillary refills: < 3 seconds  The right DP pulse is 1+  The right PT pulse is 1+  Left Foot/Ankle  Left Foot Inspection  Skin Exam: skin intact  No erythema, no maceration, no ulcer and no callus  Toe Exam: left toe deformity  Sensory   Proprioception: intact  Monofilament testing: intact    Vascular  Capillary refills: < 3 seconds  The left DP pulse is 1+  The left PT pulse is 1+  Assign Risk Category  Deformity present  No loss of protective sensation  Weak pulses  Risk: [address] Office Visit Note  22     Isaida Dill 73 y o  female MRN: [id number]  : 1950    Assessment:     1  Neuropathy  -     pregabalin (LYRICA) 100 mg capsule; Take 1 capsule (100 mg total) by mouth 2 (two) times a day    2  Anemia, unspecified type  Assessment & Plan:  Hemoglobin is 10 2  Multifactorial anemia of chronic disease will monitor  Recommend CBC back in 2 weeks  Orders:  -     b complex-vitamin C-folic acid (NEPHROCAPS) 1 mg capsule; Take 1 capsule by mouth daily  -     CBC; Future; Expected date: 2022    3  Acute on chronic diastolic CHF (congestive heart failure) (HCC)  Assessment & Plan:  Wt Readings from Last 3 Encounters:   22 113 kg (249 lb)   22 113 kg (249 lb)   22 113 kg (248 lb 12 8 oz)       Admission weight 155 discharge weight 246  Currently back on torsemide 20 mg daily tolerating fairly well  The patient knows how to control diet particularly low-salt carbohydrate control as well as daily weight  Patient to be seen by cardiologist   The patient has normal ejection fraction  Chest x-ray revealed improved CHF    Clinically sees on room air oxygen at this time  Patient is compensated  Mild edema is acceptable  Patient has new cardiologist   How patient is at risk of decompensation  Ace and arbs are contraindicated due to renal function  The patient's cardiac status is complicated by chronic kidney disease home  Some degree of prerenal azotemia to be acceptable  Recommend to closely follow with cardiologist and nephrologist   Earliest appointment she could get with cardiologist is on July 6  Recommend daily weight  Patient will follow closely weight  Currently remains on torsemide 20 mg daily  May require additional diuretic such as spironolactone or Zaroxolyn as we go will the make further decision per cardiologist and clinical status      4  Platelets decreased ([facility] )    5  KATRINA (obstructive sleep apnea)  Assessment & Plan:  Unclear about history of KATRINA  Patient was told to have hypoventilation syndrome due to obesity  Patient is on BiPAP  Patient CO2 retention  Patient was told to have a thick tongue          6  Acute on chronic respiratory failure with hypoxia and hypercapnia (HCC)  Assessment & Plan:  Acute component of the respiratory failure is resolved  Patient does have a some chronic respiratory failure with hypoxia and hypercarbia Que apnea  Patient is thought to have  alveolar hypoventilation syndrome home P  The this is complicated by recurrent CHF  Patient will be using BiPAP  Patient will be followed by sleep team and pulmonologist closely for additional testing as appropriate  7  Type 2 diabetes mellitus with hyperosmolarity without coma, without long-term current use of insulin ([facility] [address] )  Assessment & Plan:  Home blood sugar is fair  Currently on Trulicity 1 5 as well as Toujeo 25 units daily  No hypoglycemia  Oral intake reasonable  Last hemoglobin A1c was 6 8 will consider doing hemoglobin A1c in near future    Lab Results   Component Value Date    HGBA1C 6 8 (H) 03/09/2022       Orders:  -     CBC; Future; Expected date: 06/30/2022  -     Basic metabolic panel; Future; Expected date: 06/30/2022    8  Obesity hypoventilation syndrome ([address] )  Assessment & Plan:  Patient has history of alveolar hypoventilation obesity syndrome  She also has likely restrictive impairment on her PFT  PFT was done on May 18, 2022  Forced vital capacity was 0 99 L or 36% of predicted, FEV1 was 0 72 L or 34% of predicted and obstruction ratio was 73%  Flow volume loop was restricted  Patient currently is using 2 L of supplemental oxygen at night for this syndrome  She continues to use an benefit    Orders:  -     CBC; Future; Expected date: 06/30/2022  -     Basic metabolic panel; Future; Expected date: 06/30/2022    9  Essential hypertension  Assessment & Plan:  Remains remains on metoprolol 50 mg twice a day as well as torsemide 20 mg daily    Orders:  -     CBC; Future; Expected date: 06/30/2022  -     Basic metabolic panel; Future; Expected date: 06/30/2022    10  Paroxysmal atrial fibrillation (HCC)  Assessment & Plan:  Remains on metoprolol tartrate 50 mg every 12 hour as well as Eliquis 5 mg twice a day  11  Thrombophlebitis of arm, right  Assessment & Plan:  Mild residual erythema on the right forearm  Finishing up antibiotic without diarrhea  Two more days of antibiotic to be done    Overall much better  Recommend to watch closely  Call if any worse  Finishing up antibiotic  12  Closed fracture of head of left humerus with routine healing, subsequent encounter    13  Mixed hyperlipidemia  Assessment & Plan:  Lab Results   Component Value Date    CHOLESTEROL 104 03/10/2022     Lab Results   Component Value Date    HDL 30 (L) 03/10/2022     Lab Results   Component Value Date    TRIG 132 03/10/2022     Continue statin  Due for home lipid profile in near future once cardiac status is stable    Orders:  -     CBC; Future; Expected date: 06/30/2022  -     Basic metabolic panel;  Future; Expected date: 06/30/2022    14  Lower leg edema  Assessment & Plan:  Leg edema is mild  Continue diuretic as planned    Orders:  -     CBC; Future; Expected date: 06/30/2022  -     Basic metabolic panel; Future; Expected date: 06/30/2022    15  Mild asthma without complication, unspecified whether persistent  Assessment & Plan:   05/27/2022:  Pulmonary function test:FEV1/FVC Ratio: 73 % Forced Vital Capacity: 0 99 L 36 % predicted FEV1: 0 72 L 34 % predicted Interpretation: · Spirometry suggests abnormal lung volumes  Recommend checking lung volumes for further evaluation  · No significant response to the administration to bronchodilator per ATS standards · Flow volume loop is restricted  16  Other closed nondisplaced fracture of proximal end of left humerus with routine healing, subsequent encounter  Assessment & Plan:  Status post surgery for the left humerus fracture by Orthopedic  Patient did not start physical therapy as she was in the hospital recently on couple of occasion  Paragraphs he will start physical therapy at her convenience  Paragraphs recommend to follow with Orthopedic  Will order DEXA scan  She claims that she had a DEXA scan to 3 years ago at another hospital and reportedly unremarkable  Orders:  -     DXA bone density spine hip and pelvis; Future; Expected date: 07/14/2022    17  Other specified disorders of bone density and structure, left shoulder   -     DXA bone density spine hip and pelvis; Future; Expected date: 07/14/2022    18  Chronic respiratory failure with hypoxia and hypercapnia [facility])  Assessment & Plan:   05/27/2022:  Pulmonary function test:FEV1/FVC Ratio: 73 % Forced Vital Capacity: 0 99 L 36 % predicted FEV1: 0 72 L 34 % predicted Interpretation: · Spirometry suggests abnormal lung volumes  Recommend checking lung volumes for further evaluation  · No significant response to the administration to bronchodilator per ATS standards · Flow volume loop is restricted  19   On continuous oral anticoagulation - eliquis    20  Pulmonary nodules    21  Morbid obesity ([address] )        Discussion Summary and Plan: Today's care plan and medications were reviewed with patient in detail and all their questions answered to their satisfaction  All questions were answered  Patient educated about CHF a educated about CO2 retention educated about hypoventilation syndrome educated about CKD and prerenal a  Also recommend to see podiatrist due to fat flat feet  Time spent is greater than 50 minutes    Chief Complaint   Patient presents with    Congestive Heart Failure    Atrial Fibrillation    Hypertension    Follow-up    Transition of Care Management      Subjective:  TCM Call (since 5/14/2022)     Date and time call was made  6/13/2022  9:51 AM    Hospital care reviewed  Records reviewed    Patient was hospitialized at  [address] [facility]    Date of Admission  06/04/22    Date of discharge  06/13/22    Diagnosis  CHF, Afib    Disposition  Home    Were the patients medications reviewed and updated  Yes    Current Symptoms  None  She says she feels great  No issues  TCM Call (since 5/14/2022)     Post hospital issues  Reduced activity  Nursing, PT/OT going out  Should patient be enrolled in anticoag monitoring? No    Scheduled for follow up? Yes    Patients specialists  Cardiologist; Pulmonlolgist    Cardiologist name  Dr Lise Hamlin    Cardiologist contact #  [phone]    [facility] name  Dr Flores Maryana contact #  [phone]    Endocrinologist name  [id number]    Did you obtain your prescribed medications  Yes    Do you need help managing your prescriptions or medications  No    Is transportation to your appointment needed  No  Her neice drives her to   appts      I have advised the patient to call PCP with any new or worsening symptoms    Rubens Orellana, Practice Administrator    Living Arrangements  Family members    Support System  Family    The type of support provided  Emotional; Physical    Do you have social support  Yes, as much as I need    Are you recieving any outpatient services  No    Are you recieving home care services  Yes    Types of home care services  Home PT; Home RT; Nurse visit    Are you using any community resources  No    Current waiver services  No    Have you fallen in the last 12 months  No    Interperter language line needed  No    Counseling  Patient    Counseling topics  Importance of RX compliance; Activities of daily   living    Comments  Asked pt to get an appt with Cardiology sooner  Not to wait   unti her appt in 1 month  Her TCM visit with PCP is here in 2days   considering that she is high risk  Date of admission 06/04/2022:  Date of discharge 06/12/2022  Patient apparently was admitted with progressive decompensation of CHF with weight gain  Despite increasing diuretic from 20-40 in then to 60 mg by cardiologist patient continue to do poorly  Weight went up to 255  Became more shortness of breath  Patient was hospitalized  Patient's proBNP on 05/31 was 1859  On 6 for it came down to 74  EKG revealed normal sinus rhythm  Patient has been compliant with diuretics  No dietary indiscretion  Patient had noticed decreased urine output  Patient's chest x-ray revealed no acute a vascular congestions  Dry weight has been around 244 lb  Left ventricular ejection fraction is 53%  Patient was given IV Bumex switch over back to torsemide 60 mg on 06/09 and discharged on torsemide as per the discharge 20 mg  Patient will obtain BMP in 4 days  Patient is advised to be on love low-salt diet      During hospital admission patient had a thrombophlebitis of the right arm treated with supportive treatment as well as the Keflex for 7 days  Baseline creatinine 1 7-1 9 CKD level 3   The patient is having to tolerate higher creatinine to keep her dry        Obstructive sleep apnea patient was seen by pulmonologist today their notes were reviewed  Patient also has a the pulmonary hyper alveolar hypoventilation due to obesity  Patient does have a nocturnal hypoxia  Now patient was noncompliant with BiPAP now patient will be using BiPAP 12/6  Patient will be followed with outpatient sleep team     Diabetes last hemoglobin A1c was 6 8 currently on Lantus 25 units at bedtime  PAF  Rate controlled rate rhythm regular tolerating current regimen with metoprolol 50 mg twice a day and Eliquis 5 mg twice a day  Paragraphs l     Acute respiratory failure with hypoxia resolved as of 06/10/2022  The mildly hypoxic on admission worsen oxygen tolerated nighttime  Able to saturate appropriately on 06/06 a m  Without oxygen  Back to O2 supply at nighttime  The patient to use albuterol p r n  Wendy Millan Previous studies:---    Principal Problem:    Acute on chronic respiratory failure with hypoxia and hypercapnia (HCC)  Active Problems:    Acute on chronic diastolic CHF (congestive heart failure) (HCC)    Diabetes mellitus, type 2 (HCC)    Acute kidney injury on CKD stage 3    Atrial fibrillation with rapid ventricular response (HCC)    Obesity hypoventilation syndrome (HCC)    Asthma    Mixed hyperlipidemia    Essential hypertension    Status post reverse total replacement of left shoulder    Lower leg edema    Anemia    Morbid obesity ([address] )  Resolved Problems:    UTI (urinary tract infection)    Epistaxis    Acute encephalopathy    Positive blood culture       Patient recently was hospitalized  Patient was treated for acute on chronic diastolic congestive heart failure     Patient had 2 recent hospitalization  Initially presented on 05/13 subsequently re-presented on 05/16/2022  Patient's weight was 267 lb on 5-13  Discharge weight on 05/22/2022 was 247 lb  Chest x-ray revealed vascular congestion  Ejection fraction revealed normal ejection fraction  Patient was transitioned from Lasix to torsemide 20 mg daily     Patient's weight on her own home scale on the day of the discharge was 244 lb  Weight today at home is 248 lb  Weight here today is 252 lb  Patient was seen by cardiologist on Tuesday  Weight was stable at that 0 2 44 lb      Patient also had acute on chronic respiratory failure with hypoxia and hypercapnia  Initial VBG revealed pH of 7 019--127--55 -38  Patient improved with diuresis  Patient is claustrophobic  Patient was advised to use the BiPAP  Patient is afraid to use it because of the anxiety  Patient has upcoming appointment on 14th of  I advised them that they need to talk to pulmonologist about use of antianxiety  I did explain that antianxiety can did a suppressed the breathing particularly if she starts retaining pCO2 who  This is going to be a challenging situation  The reason for using BiPAP is for the obesity related hypoventilation  Patient is on oxygen at nighttime when she is not using BiPAP  Atrial fibrillation with rapid ventricular rate:  Patient's heart rate was rapid  Patient's metoprolol were increased to 50 mg q 12 hour  Patient remains on Eliquis      CKD level 3  Baseline creatinine is is in the range of 1 4-1 6  Patient presented with creatinine of 2 1  Diabetes:  Patient is currently monitoring blood sugar  Hemoglobin A1c 03/09/2022 was 6 8  Currently on Toujeo 25 units daily  As well as sliding scale  Home sugar is not low  Also remains on Trulicity 1 5 weekly  The  Hypertension remains on metoprolol as outlined above  History of tore total replacement of the left shoulder the:  Patient has not been going for formal therapy due to multiple hospital admissions  For the shoulder   Hyperlipidemia remains on rosuvastatin 10 mg daily  Asthma th:  Patient is using at albuterol p r n  Urinary tract infection:  Symptom-free   The morbid obesity BMI 45 54    Patient is watching diet more strictly  Anemia:  Hemoglobin in the range of 10 g at this time  Follow-up iron studies, iron 52, iron sat 20% ferritin 236, TIBC 261  B12, folate adequate  Prior immunofixation without any evidence of monoclonal gammopathy  Lower leg edema:  Mild to moderate  Recent chronic respiratory failure with CO2 retention:  Patient does have a hypoventilation syndrome  Patient was seen by pulmonologist the notes were reviewed    Per their note  Patient apparently was given a BiPAP previously for hypercapnia  Patient did have ABGs done during hospitalization May 18, 2022  At that time her pCO2 was 62 8  She was given a BiPAP  Patient reports that she has not been able to use on a consistent basis due to her frequent hospitalizations  He is going to try to use once again  Patient was given a BiPAP 12/6 during her last hospitalization in May of 2022  She is aware that oxygen was not ordered a 30% is a nocturnal pulse oximetry needs to be done  "I will order this so that the next time she is able to use BiPAP successfully if possible for 4 hour she will use a pulse oximeter at that point" per NP note  Morbid obesity with hypoventilation syndrome:Patient has history of alveolar hypoventilation obesity syndrome  She also has likely restrictive impairment on her PFT  PFT was done on May 18, 2022  Forced vital capacity was 0 99 L or 36% of predicted, FEV1 was 0 72 L or 34% of predicted and obstruction ratio was 73%  Flow volume loop was restricted  Patient currently is using 2 L of supplemental oxygen at night for this syndrome  She continues to use an benefit     At this time patient is comfortable  Denies any shortness of breath denies any chest pain palpitation PND orthopnea  Patient's granddaughter is accompanying  Mild edema is present  Denies any hypoglycemic symptoms  Appetite is fair  Patient has chosen new cardiologist at this point  Home    Patient was educated about CHF multiple medications  Also we talked about complexity given her renal functions limiting many medications    We we had talked about CO2 narcosis last time  Patient was educated about this by pulmonologist the home  They will closely follow-up  Offers no other specific complaint  05/13/2022:  Creatinine 1 49 BUN 36 rest of the LFT unremarkable, INR 1 18, blood sugar 282 hemoglobin 9 2  06/12/2022: BMP normal except BUN 97 and creatinine 1 93, CO2 35  06/11/2022: BMP normal except BUN 98 creatinine 1 944, CO2 36, hemoglobin 10 2 and platelet 044  14/83/9116:  BUN 91 creatinine 2 06 CO2 35    03/29/2022:  CT scan of chest abdomen and pelvis:  Decreased bilateral effusion, pulmonary edema as described, passive atelectasis in the lower lobes, multiple lung nodule as described follow-up CT scan in 3 months recommended, small fat containing periumbilical hernia, degenerative changes in the thoracolumbar area, mild level scoliosis,  03/29/2022:  Venous Doppler right upper extremity shows no evidence of thrombus in the internal jugular subclavian or brachiocephalic vein, left upper extremity evidence of superficial thrombophlebitis noted in the cephalic vein no evidence of acute or deep vein thrombosis otherwise  05/13/2022:  Chest x-ray mild pulmonary vascular congestion with small bilateral effusion  05/16/2022:  Chest x-ray mild cardiomegaly with mild was increased vascular congestion increase bilateral pleural effusion  05/31/2022:  Chest x-ray:  Improving aeration compatible with improving CHF no acute infiltrate    05/16/2022:  CT scan of the brain no acute intracranial abnormality new left mastoid effusion       05/27/2022:  Pulmonary function test:FEV1/FVC Ratio: 73 % Forced Vital Capacity: 0 99 L 36 % predicted FEV1: 0 72 L 34 % predicted Interpretation: · Spirometry suggests abnormal lung volumes  Recommend checking lung volumes for further evaluation  · No significant response to the administration to bronchodilator per ATS standards · Flow volume loop is restricted    06/05/2022:  Chest x-ray no acute cardiopulmonary disease      The following portions of the patient's history were reviewed and updated as appropriate: allergies, current medications, past family history, past medical history, past social history, past surgical history and problem list     Review of Systems   All other systems reviewed and are negative          Historical Information   Patient Active Problem List   Diagnosis    Asthma    Morbid obesity with BMI of 45 0-49 9, adult (HCC)    Lower leg edema    Paroxysmal atrial fibrillation (HCC)    Mixed hyperlipidemia    Anemia    Essential hypertension    Humeral head fracture    PONV (postoperative nausea and vomiting)    Diabetes mellitus, type 2 (HCC)    Gastroesophageal reflux disease    Nephrolithiasis    Arthritis    S/P total knee replacement, right    On continuous oral anticoagulation - eliquis    Closed fracture of proximal end of left humerus with routine healing    Status post reverse total replacement of left shoulder    Acute kidney injury on CKD stage 3    Peripheral venous insufficiency    Post-herpetic polyneuropathy    Raynaud's disease    Lung nodule < 6cm on CT    Acute on chronic diastolic CHF (congestive heart failure) (HCC)    Pulmonary nodules    Lump of left breast    Pressure injury of deep tissue of sacral region    Morbid obesity (HCC)    Pure hypercholesterolemia    Acute on chronic respiratory failure with hypoxia and hypercapnia (HCC)    Obesity hypoventilation syndrome (HCC)    KATRINA (obstructive sleep apnea)    Elevated serum creatinine    Thrombophlebitis of arm, right    Chronic respiratory failure with hypoxia and hypercapnia (HCC)    Platelets decreased (HCC)     Past Medical History:   Diagnosis Date    Anemia     Asthma     COVID-19 January 2022    GERD (gastroesophageal reflux disease)     Hyperlipidemia     PONV (postoperative nausea and vomiting)     SVT [facility]supraventricular tachycardia) ([facility])      Past Surgical History:   Procedure Laterality Date  APPENDECTOMY      CYSTOSCOPY W/ LASER LITHOTRIPSY Left 2016    Procedure: CYSTOSCOPY URETEROSCOPY WITH LITHOTRIPSY HOLMIUM LASER, RETROGRADE PYELOGRAM AND INSERTION STENT URETERAL;  Surgeon: Dominic Bolanos MD;  Location: [address] [place] [address];  Service:     DILATION AND CURETTAGE OF UTERUS      IR THORACENTESIS  3/18/2022    JOINT REPLACEMENT      right knee    KNEE ARTHROPLASTY Right     KS CYSTOURETHROSCOPY,URETER CATHETER Left 2016    Procedure: CYSTOSCOPY RETROGRADE PYELOGRAM WITH INSERTION STENT URETERAL, left;  Surgeon: Dominic Bolanos MD;  Location: [facility] OR;  Service: Urology    KS RECONSTR TOTAL SHOULDER IMPLANT Left 3/1/2022    Procedure: ARTHROPLASTY SHOULDER REVERSE;  Surgeon: Hakan North MD;  Location: [facility];  Service: Orthopedics    SHOULDER ARTHROTOMY Left      Social History     Substance and Sexual Activity   Alcohol Use Not Currently    Comment: rarely     Social History     Substance and Sexual Activity   Drug Use No     Social History     Tobacco Use   Smoking Status Former Smoker    Packs/day: 1 00    Years: 20 00    Pack years: 20 00    Types: Cigarettes    Quit date: 1996    Years since quittin 9   Smokeless Tobacco Never Used     Family History   Problem Relation Age of Onset    Heart disease Mother     Emphysema Maternal Grandmother     Heart disease Family      Health Maintenance Due   Topic    Hepatitis C Screening     Medicare Annual Wellness Visit (AWV)     Pneumococcal Vaccine: 65+ Years (1 - PCV)    Diabetic Foot Exam     DM Eye Exam     DTaP,Tdap,and Td Vaccines (1 - Tdap)    Breast Cancer Screening: Mammogram     Osteoporosis Screening     URINE MICROALBUMIN     COVID-19 Vaccine (2 - Moderna series)    Influenza Vaccine (Season Ended)    HEMOGLOBIN A1C       Meds/Allergies       Current Outpatient Medications:     acetaminophen (TYLENOL) 325 mg tablet, Take 650 mg by mouth every 6 (six) hours as needed for mild pain  , Disp: , Rfl:     albuterol (PROVENTIL HFA,VENTOLIN HFA) 90 mcg/act inhaler, Inhale 2 puffs every 6 (six) hours as needed for wheezing, Disp: 8 g, Rfl: 1    ammonium lactate (LAC-HYDRIN) 12 % lotion, APPLY TOPICALLY TO AFFECTED AREAS twice a day, Disp: , Rfl:     apixaban (ELIQUIS) 5 mg, Take 1 tablet (5 mg total) by mouth in the morning and 1 tablet (5 mg total) in the evening , Disp: 180 tablet, Rfl: 1    b complex-vitamin C-folic acid (NEPHROCAPS) 1 mg capsule, Take 1 capsule by mouth daily, Disp: 30 capsule, Rfl: 5    cephalexin (KEFLEX) 500 mg capsule, Take 1 capsule (500 mg total) by mouth every 8 (eight) hours for 7 days, Disp: 21 capsule, Rfl: 0    docusate sodium (COLACE) 100 mg capsule, Take 100 mg by mouth in the morning, Disp: , Rfl:     glucose blood (OneTouch Ultra) test strip, Test twice daily  , Disp: 100 strip, Rfl: 2    insulin lispro (HumaLOG KwikPen) 100 units/mL injection pen, 3 times with meal according to sliding scale - >Blood Glucose 150 - 199: 2 Unit of Insulin, Blood Glucose 200 - 249: 4 Units of Insulin, Blood Glucose 250 - 299: 6 Units of Insulin, Blood Glucose 300 - 349: 8 Units of Insulin, Blood Glucose 350 - 399: 10 Units of [place],Blood Glucose greater than or equal to 400: 12 Units of Insulin-please contact your physician, Disp: 15 mL, Rfl: 0    Insulin Pen Needle (BD Pen Needle Pilar 2nd Gen) 32G X 4 MM MISC, For use with insulin pen   Pharmacy may dispense brand covered by insurance , Disp: 100 each, Rfl: 0    Insulin Pen Needle (NovoFine Autocover) 30G X 8 MM MISC, Inject under the skin daily, Disp: 100 each, Rfl: 3    Insulin Pen Needle 30G X 8 MM MISC, 2 (two) times a day, Disp: , Rfl:     Lancets (onetouch ultrasoft) lancets, Use once daily, Disp: 100 each, Rfl: 2    metoprolol tartrate (LOPRESSOR) 50 mg tablet, Take 1 tablet (50 mg total) by mouth every 12 (twelve) hours, Disp: 180 tablet, Rfl: 1    montelukast (SINGULAIR) 10 mg tablet, Take 10 mg by mouth daily  , Disp: , Rfl:     nystatin (MYCOSTATIN) powder, Apply topically 2 (two) times a day, Disp: , Rfl:     pregabalin (LYRICA) 100 mg capsule, Take 1 capsule (100 mg total) by mouth 2 (two) times a day, Disp: 180 capsule, Rfl: 1    rosuvastatin (CRESTOR) 10 MG tablet, 10 mg daily  , Disp: , Rfl: 0    sodium chloride (OCEAN) 0 65 % nasal spray, 1 spray into each nostril every hour as needed for congestion, Disp: , Rfl: 0    torsemide (DEMADEX) 20 mg tablet, Take 1 tablet (20 mg total) by mouth every morning, Disp: 90 tablet, Rfl: 1    Toujeo SoloStar 300 units/mL CONCENTRATED U-300 injection pen (1-unit dial), Inject 25 Units under the skin daily at bedtime, Disp: 4 5 mL, Rfl: 0    Trulicity 1 5 MT/5 1OS SOPN, inject 0 5 milliliter subcutaneously every week 28, Disp: , Rfl:       Objective:    Vitals:   Pulse 68   Ht 5' 2" (1 575 m)   Wt 113 kg (249 lb)   SpO2 98%   BMI 45 54 kg/m²   Body mass index is 45 54 kg/m²  Vitals:    06/14/22 1511   Weight: 113 kg (249 lb)       Physical Exam  Vitals reviewed  Exam conducted with a chaperone present  Constitutional:       General: She is not in acute distress  Appearance: She is well-developed  She is obese  She is not ill-appearing, toxic-appearing or diaphoretic  HENT:      Head: Normocephalic and atraumatic  Right Ear: External ear normal       Left Ear: External ear normal       Nose: No congestion or rhinorrhea  Eyes:      General: Lids are normal          Right eye: No discharge  Left eye: No discharge  Conjunctiva/sclera: Conjunctivae normal    Neck:      Thyroid: No thyroid mass or thyromegaly  Vascular: No carotid bruit or JVD  Trachea: Trachea normal    Cardiovascular:      Rate and Rhythm: Rhythm irregular  Pulses: Pulses are weak  Dorsalis pedis pulses are 1+ on the right side and 1+ on the left side  Posterior tibial pulses are 1+ on the right side and 1+ on the left side        Heart sounds: Normal heart sounds  No murmur heard  Pulmonary:      Effort: No respiratory distress  Breath sounds: Normal breath sounds  No wheezing, rhonchi or rales  Abdominal:      General: Bowel sounds are normal    Musculoskeletal:      Right lower le+ Edema present  Left lower le+ Edema present  Right foot: Decreased range of motion  Left foot: Decreased range of motion  Feet:      Right foot:      Skin integrity: No ulcer, blister, skin breakdown, erythema or callus  Left foot:      Skin integrity: No ulcer, blister, skin breakdown, erythema or callus  Comments: Bilateral flat feet is present  Ankle range of motion is diminished  DJD 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  Lymphadenopathy:      Cervical: No cervical adenopathy  Skin:     General: Skin is warm  Coloration: Skin is not jaundiced or pale  Findings: No rash  Neurological:      Mental Status: She is alert  Coordination: Coordination normal       Gait: Gait abnormal    Psychiatric:         Mood and Affect: Mood normal          Behavior: Behavior normal          Thought Content:  Thought content normal          Judgment: Judgment normal          Lab Review   Admission on 2022, Discharged on 2022   Component Date Value Ref Range Status    WBC 2022 8 08  4 31 - 10 16 Thousand/uL Final    RBC 2022 3 90  3 81 - 5 12 Million/uL Final    Hemoglobin 2022 11 0 (A) 11 5 - 15 4 g/dL Final    Hematocrit 2022 35 8  34 8 - 46 1 % Final    MCV 2022 92  82 - 98 fL Final    MCH 2022 28 2  26 8 - 34 3 pg Final    MCHC 2022 30 7 (A) 31 4 - 37 4 g/dL Final    RDW 2022 14 5  11 6 - 15 1 % Final    MPV 2022 11 2  8 9 - 12 7 fL Final    Platelets  181  149 - 390 Thousands/uL Final    nRBC 2022 0  /100 WBCs Final    Neutrophils Relative 2022 58  43 - 75 % Final    Immat GRANS % 2022 0  0 - 2 % Final    Lymphocytes Relative 06/04/2022 25  14 - 44 % Final    Monocytes Relative 06/04/2022 10  4 - 12 % Final    Eosinophils Relative 06/04/2022 6  0 - 6 % Final    Basophils Relative 06/04/2022 1  0 - 1 % Final    Neutrophils Absolute 06/04/2022 4 66  1 85 - 7 62 Thousands/µL Final    Immature Grans Absolute 06/04/2022 0 03  0 00 - 0 20 Thousand/uL Final    Lymphocytes Absolute 06/04/2022 2 05  0 60 - 4 47 Thousands/µL Final    Monocytes Absolute 06/04/2022 0 77  0 17 - 1 22 Thousand/µL Final    Eosinophils Absolute 06/04/2022 0 50  0 00 - 0 61 Thousand/µL Final    Basophils Absolute 06/04/2022 0 07  0 00 - 0 10 Thousands/µL Final    Protime 06/04/2022 15 7 (A) 11 6 - 14 5 seconds Final    INR 06/04/2022 1 25 (A) 0 84 - 1 19 Final    PTT 06/04/2022 31  23 - 37 seconds Final    Therapeutic Heparin Range =  60-90 seconds    Sodium 06/04/2022 139  135 - 147 mmol/L Final    Potassium 06/04/2022 4 5  3 5 - 5 3 mmol/L Final    Chloride 06/04/2022 98  96 - 108 mmol/L Final    CO2 06/04/2022 32  21 - 32 mmol/L Final    ANION GAP 06/04/2022 9  4 - 13 mmol/L Final    BUN 06/04/2022 69 (A) 5 - 25 mg/dL Final    Creatinine 06/04/2022 1 62 (A) 0 60 - 1 30 mg/dL Final    Standardized to IDMS reference method    Glucose 06/04/2022 196 (A) 65 - 140 mg/dL Final    If the patient is fasting, the ADA then defines impaired fasting glucose as > 100 mg/dL and diabetes as > or equal to 123 mg/dL  Specimen collection should occur prior to Sulfasalazine administration due to the potential for falsely depressed results  Specimen collection should occur prior to Sulfapyridine administration due to the potential for falsely elevated results   Calcium 06/04/2022 8 8  8 4 - 10 2 mg/dL Final    AST 06/04/2022 15  13 - 39 U/L Final    Specimen collection should occur prior to Sulfasalazine administration due to the potential for falsely depressed results       ALT 06/04/2022 12  7 - 52 U/L Final    Specimen collection should occur prior to Sulfasalazine administration due to the potential for falsely depressed results   Alkaline Phosphatase 06/04/2022 61  34 - 104 U/L Final    Total Protein 06/04/2022 7 6  6 4 - 8 4 g/dL Final    Albumin 06/04/2022 3 8  3 5 - 5 0 g/dL Final    Total Bilirubin 06/04/2022 0 37  0 20 - 1 00 mg/dL Final    eGFR 06/04/2022 32  ml/min/1 73sq m Final    BNP 06/04/2022 74  0 - 100 pg/mL Final    hs TnI 0hr 06/04/2022 6  "Refer to ACS Flowchart"- see link ng/L Final    Comment:                                              Initial (time 0) result  If >=50 ng/L, Myocardial injury suggested ;  Type of myocardial injury and treatment strategy  to be determined  If 5-49 ng/L, a delta result at 2 hours and or 4 hours will be needed to further evaluate  If <4 ng/L, and chest pain has been >3 hours since onset, patient may qualify for discharge based on the HEART score in the ED  If <5 ng/L and <3hours since onset of chest pain, a delta result at 2 hours will be needed to further evaluate  HS Troponin 99th Percentile URL of a Health Population=12 ng/L with a 95% Confidence Interval of 8-18 ng/L  Second Troponin (time 2 hours)  If calculated delta >= 20 ng/L,  Myocardial injury suggested ; Type of myocardial injury and treatment strategy to be determined  If 5-49 ng/L and the calculated delta is 5-19 ng/L, consult medical service for evaluation  Continue evaluation for ischemia on ecg and other possible etiology and repeat hs troponin at 4 hours  If delta                            is <5 ng/L at 2 hours, consider discharge based on risk stratification via the HEART score (if in ED), or VASILE risk score in IP/Observation  HS Troponin 99th Percentile URL of a Health Population=12 ng/L with a 95% Confidence Interval of 8-18 ng/L      hs TnI 2hr 06/04/2022 6  "Refer to ACS Flowchart"- see link ng/L Final    Comment:                                              Initial (time 0) result  If >=50 ng/L, Myocardial injury suggested ;  Type of myocardial injury and treatment strategy  to be determined  If 5-49 ng/L, a delta result at 2 hours and or 4 hours will be needed to further evaluate  If <4 ng/L, and chest pain has been >3 hours since onset, patient may qualify for discharge based on the HEART score in the ED  If <5 ng/L and <3hours since onset of chest pain, a delta result at 2 hours will be needed to further evaluate  HS Troponin 99th Percentile URL of a Health Population=12 ng/L with a 95% Confidence Interval of 8-18 ng/L  Second Troponin (time 2 hours)  If calculated delta >= 20 ng/L,  Myocardial injury suggested ; Type of myocardial injury and treatment strategy to be determined  If 5-49 ng/L and the calculated delta is 5-19 ng/L, consult medical service for evaluation  Continue evaluation for ischemia on ecg and other possible etiology and repeat hs troponin at 4 hours  If delta                            is <5 ng/L at 2 hours, consider discharge based on risk stratification via the HEART score (if in ED), or VASILE risk score in IP/Observation  HS Troponin 99th Percentile URL of a Health Population=12 ng/L with a 95% Confidence Interval of 8-18 ng/L   Delta 2hr hsTnI 06/04/2022 0  <20 ng/L Final    hs TnI 4hr 06/04/2022 7  "Refer to ACS Flowchart"- see link ng/L Final    Comment:                                              Initial (time 0) result  If >=50 ng/L, Myocardial injury suggested ;  Type of myocardial injury and treatment strategy  to be determined  If 5-49 ng/L, a delta result at 2 hours and or 4 hours will be needed to further evaluate  If <4 ng/L, and chest pain has been >3 hours since onset, patient may qualify for discharge based on the HEART score in the ED  If <5 ng/L and <3hours since onset of chest pain, a delta result at 2 hours will be needed to further evaluate      HS Troponin 99th Percentile URL of a Health Population=12 ng/L with a 95% Confidence Interval of 8-18 ng/L     Second Troponin (time 2 hours)  If calculated delta >= 20 ng/L,  Myocardial injury suggested ; Type of myocardial injury and treatment strategy to be determined  If 5-49 ng/L and the calculated delta is 5-19 ng/L, consult medical service for evaluation  Continue evaluation for ischemia on ecg and other possible etiology and repeat hs troponin at 4 hours  If delta                            is <5 ng/L at 2 hours, consider discharge based on risk stratification via the HEART score (if in ED), or VASILE risk score in IP/Observation  HS Troponin 99th Percentile URL of a Health Population=12 ng/L with a 95% Confidence Interval of 8-18 ng/L   Delta 4hr hsTnI 06/04/2022 1  <20 ng/L Final    POC Glucose 06/04/2022 182 (A) 65 - 140 mg/dl Final    POC Glucose 06/04/2022 206 (A) 65 - 140 mg/dl Final    Sodium 06/05/2022 141  135 - 147 mmol/L Final    Potassium 06/05/2022 4 3  3 5 - 5 3 mmol/L Final    Chloride 06/05/2022 100  96 - 108 mmol/L Final    CO2 06/05/2022 36 (A) 21 - 32 mmol/L Final    ANION GAP 06/05/2022 5  4 - 13 mmol/L Final    BUN 06/05/2022 68 (A) 5 - 25 mg/dL Final    Creatinine 06/05/2022 1 64 (A) 0 60 - 1 30 mg/dL Final    Standardized to IDMS reference method    Glucose 06/05/2022 224 (A) 65 - 140 mg/dL Final    If the patient is fasting, the ADA then defines impaired fasting glucose as > 100 mg/dL and diabetes as > or equal to 123 mg/dL  Specimen collection should occur prior to Sulfasalazine administration due to the potential for falsely depressed results  Specimen collection should occur prior to Sulfapyridine administration due to the potential for falsely elevated results      Calcium 06/05/2022 8 6  8 4 - 10 2 mg/dL Final    eGFR 06/05/2022 31  ml/min/1 73sq m Final    Magnesium 06/05/2022 2 0  1 9 - 2 7 mg/dL Final    Ventricular Rate 06/04/2022 75  BPM Final    Atrial Rate 06/04/2022 78  BPM Final    MT Interval 06/04/2022 192  ms Final    QRSD Interval 06/04/2022 70  ms Final    QT Interval 06/04/2022 362  ms Final    QTC Interval 06/04/2022 405  ms Final    P Axis 06/04/2022 63  degrees Final    QRS Axis 06/04/2022 -36  degrees Final    T Wave [place] 06/04/2022 33  degrees Final    POC Glucose 06/05/2022 215 (A) 65 - 140 mg/dl Final    POC Glucose 06/05/2022 269 (A) 65 - 140 mg/dl Final    POC Glucose 06/05/2022 222 (A) 65 - 140 mg/dl Final    POC Glucose 06/05/2022 288 (A) 65 - 140 mg/dl Final    Sodium 06/06/2022 140  135 - 147 mmol/L Final    Potassium 06/06/2022 4 2  3 5 - 5 3 mmol/L Final    Chloride 06/06/2022 97  96 - 108 mmol/L Final    CO2 06/06/2022 36 (A) 21 - 32 mmol/L Final    ANION GAP 06/06/2022 7  4 - 13 mmol/L Final    BUN 06/06/2022 66 (A) 5 - 25 mg/dL Final    Creatinine 06/06/2022 1 46 (A) 0 60 - 1 30 mg/dL Final    Standardized to IDMS reference method    Glucose 06/06/2022 151 (A) 65 - 140 mg/dL Final    If the patient is fasting, the ADA then defines impaired fasting glucose as > 100 mg/dL and diabetes as > or equal to 123 mg/dL  Specimen collection should occur prior to Sulfasalazine administration due to the potential for falsely depressed results  Specimen collection should occur prior to Sulfapyridine administration due to the potential for falsely elevated results      Calcium 06/06/2022 8 4  8 4 - 10 2 mg/dL Final    eGFR 06/06/2022 36  ml/min/1 73sq m Final    POC Glucose 06/06/2022 171 (A) 65 - 140 mg/dl Final    POC Glucose 06/06/2022 300 (A) 65 - 140 mg/dl Final    POC Glucose 06/06/2022 231 (A) 65 - 140 mg/dl Final    POC Glucose 06/06/2022 206 (A) 65 - 140 mg/dl Final    Sodium 06/07/2022 139  135 - 147 mmol/L Final    Potassium 06/07/2022 4 4  3 5 - 5 3 mmol/L Final    Chloride 06/07/2022 96  96 - 108 mmol/L Final    CO2 06/07/2022 37 (A) 21 - 32 mmol/L Final    ANION GAP 06/07/2022 6  4 - 13 mmol/L Final    BUN 06/07/2022 70 (A) 5 - 25 mg/dL Final    Creatinine 06/07/2022 1 52 (A) 0 60 - 1 30 mg/dL Final Standardized to IDMS reference method    Glucose 06/07/2022 124  65 - 140 mg/dL Final    If the patient is fasting, the ADA then defines impaired fasting glucose as > 100 mg/dL and diabetes as > or equal to 123 mg/dL  Specimen collection should occur prior to Sulfasalazine administration due to the potential for falsely depressed results  Specimen collection should occur prior to Sulfapyridine administration due to the potential for falsely elevated results   Calcium 06/07/2022 8 6  8 4 - 10 2 mg/dL Final    eGFR 06/07/2022 34  ml/min/1 73sq m Final    POC Glucose 06/07/2022 128  65 - 140 mg/dl Final    POC Glucose 06/07/2022 159 (A) 65 - 140 mg/dl Final    POC Glucose 06/07/2022 165 (A) 65 - 140 mg/dl Final    POC Glucose 06/07/2022 240 (A) 65 - 140 mg/dl Final    Sodium 06/08/2022 137  135 - 147 mmol/L Final    Potassium 06/08/2022 3 9  3 5 - 5 3 mmol/L Final    Chloride 06/08/2022 95 (A) 96 - 108 mmol/L Final    CO2 06/08/2022 35 (A) 21 - 32 mmol/L Final    ANION GAP 06/08/2022 7  4 - 13 mmol/L Final    BUN 06/08/2022 82 (A) 5 - 25 mg/dL Final    Creatinine 06/08/2022 1 71 (A) 0 60 - 1 30 mg/dL Final    Standardized to IDMS reference method    Glucose 06/08/2022 190 (A) 65 - 140 mg/dL Final    If the patient is fasting, the ADA then defines impaired fasting glucose as > 100 mg/dL and diabetes as > or equal to 123 mg/dL  Specimen collection should occur prior to Sulfasalazine administration due to the potential for falsely depressed results  Specimen collection should occur prior to Sulfapyridine administration due to the potential for falsely elevated results      Calcium 06/08/2022 8 7  8 4 - 10 2 mg/dL Final    eGFR 06/08/2022 30  ml/min/1 73sq m Final    POC Glucose 06/08/2022 170 (A) 65 - 140 mg/dl Final    POC Glucose 06/08/2022 248 (A) 65 - 140 mg/dl Final    POC Glucose 06/08/2022 240 (A) 65 - 140 mg/dl Final    POC Glucose 06/08/2022 203 (A) 65 - 140 mg/dl Final    Sodium 06/09/2022 137  135 - 147 mmol/L Final    Potassium 06/09/2022 4 2  3 5 - 5 3 mmol/L Final    Chloride 06/09/2022 94 (A) 96 - 108 mmol/L Final    CO2 06/09/2022 35 (A) 21 - 32 mmol/L Final    ANION GAP 06/09/2022 8  4 - 13 mmol/L Final    BUN 06/09/2022 82 (A) 5 - 25 mg/dL Final    Creatinine 06/09/2022 1 50 (A) 0 60 - 1 30 mg/dL Final    Standardized to IDMS reference method    Glucose 06/09/2022 188 (A) 65 - 140 mg/dL Final    If the patient is fasting, the ADA then defines impaired fasting glucose as > 100 mg/dL and diabetes as > or equal to 123 mg/dL  Specimen collection should occur prior to Sulfasalazine administration due to the potential for falsely depressed results  Specimen collection should occur prior to Sulfapyridine administration due to the potential for falsely elevated results   Calcium 06/09/2022 8 8  8 4 - 10 2 mg/dL Final    eGFR 06/09/2022 35  ml/min/1 73sq m Final    POC Glucose 06/09/2022 190 (A) 65 - 140 mg/dl Final    POC Glucose 06/09/2022 288 (A) 65 - 140 mg/dl Final    POC Glucose 06/09/2022 257 (A) 65 - 140 mg/dl Final    POC Glucose 06/09/2022 220 (A) 65 - 140 mg/dl Final    POC Glucose 06/09/2022 249 (A) 65 - 140 mg/dl Final    Sodium 06/10/2022 137  135 - 147 mmol/L Final    Potassium 06/10/2022 4 2  3 5 - 5 3 mmol/L Final    Chloride 06/10/2022 94 (A) 96 - 108 mmol/L Final    CO2 06/10/2022 35 (A) 21 - 32 mmol/L Final    ANION GAP 06/10/2022 8  4 - 13 mmol/L Final    BUN 06/10/2022 91 (A) 5 - 25 mg/dL Final    Creatinine 06/10/2022 2 06 (A) 0 60 - 1 30 mg/dL Final    Standardized to IDMS reference method    Glucose 06/10/2022 128  65 - 140 mg/dL Final    If the patient is fasting, the ADA then defines impaired fasting glucose as > 100 mg/dL and diabetes as > or equal to 123 mg/dL  Specimen collection should occur prior to Sulfasalazine administration due to the potential for falsely depressed results   Specimen collection should occur prior to Sulfapyridine administration due to the potential for falsely elevated results   Calcium 06/10/2022 8 4  8 4 - 10 2 mg/dL Final    eGFR 06/10/2022 24  ml/min/1 73sq m Final    POC Glucose 06/10/2022 161 (A) 65 - 140 mg/dl Final    POC Glucose 06/10/2022 260 (A) 65 - 140 mg/dl Final    POC Glucose 06/10/2022 267 (A) 65 - 140 mg/dl Final    Blood Culture 06/10/2022 No Growth at 72 hrs  Preliminary    Blood Culture 06/10/2022 No Growth at 72 hrs  Preliminary    POC Glucose 06/10/2022 277 (A) 65 - 140 mg/dl Final    Sodium 06/11/2022 136  135 - 147 mmol/L Final    Potassium 06/11/2022 4 1  3 5 - 5 3 mmol/L Final    Chloride 06/11/2022 93 (A) 96 - 108 mmol/L Final    CO2 06/11/2022 36 (A) 21 - 32 mmol/L Final    ANION GAP 06/11/2022 7  4 - 13 mmol/L Final    BUN 06/11/2022 98 (A) 5 - 25 mg/dL Final    Creatinine 06/11/2022 1 94 (A) 0 60 - 1 30 mg/dL Final    Standardized to IDMS reference method    Glucose 06/11/2022 180 (A) 65 - 140 mg/dL Final    If the patient is fasting, the ADA then defines impaired fasting glucose as > 100 mg/dL and diabetes as > or equal to 123 mg/dL  Specimen collection should occur prior to Sulfasalazine administration due to the potential for falsely depressed results  Specimen collection should occur prior to Sulfapyridine administration due to the potential for falsely elevated results      Calcium 06/11/2022 8 4  8 4 - 10 2 mg/dL Final    eGFR 06/11/2022 25  ml/min/1 73sq m Final    Magnesium 06/11/2022 2 3  1 9 - 2 7 mg/dL Final    WBC 06/11/2022 7 01  4 31 - 10 16 Thousand/uL Final    RBC 06/11/2022 3 71 (A) 3 81 - 5 12 Million/uL Final    Hemoglobin 06/11/2022 10 2 (A) 11 5 - 15 4 g/dL Final    Hematocrit 06/11/2022 34 0 (A) 34 8 - 46 1 % Final    MCV 06/11/2022 92  82 - 98 fL Final    MCH 06/11/2022 27 5  26 8 - 34 3 pg Final    MCHC 06/11/2022 30 0 (A) 31 4 - 37 4 g/dL Final    RDW 06/11/2022 15 1  11 6 - 15 1 % Final    MPV 06/11/2022 11 5  8 9 - 12 7 fL Final    Platelets 02/68/2537 119 (A) 149 - 390 Thousands/uL Final    nRBC 06/11/2022 0  /100 WBCs Final    Neutrophils Relative 06/11/2022 51  43 - 75 % Final    Immat GRANS % 06/11/2022 0  0 - 2 % Final    Lymphocytes Relative 06/11/2022 28  14 - 44 % Final    Monocytes Relative 06/11/2022 14 (A) 4 - 12 % Final    Eosinophils Relative 06/11/2022 6  0 - 6 % Final    Basophils Relative 06/11/2022 1  0 - 1 % Final    Neutrophils Absolute 06/11/2022 3 54  1 85 - 7 62 Thousands/µL Final    Immature Grans Absolute 06/11/2022 0 03  0 00 - 0 20 Thousand/uL Final    Lymphocytes Absolute 06/11/2022 1 94  0 60 - 4 47 Thousands/µL Final    Monocytes Absolute 06/11/2022 1 00  0 17 - 1 22 Thousand/µL Final    Eosinophils Absolute 06/11/2022 0 45  0 00 - 0 61 Thousand/µL Final    Basophils Absolute 06/11/2022 0 05  0 00 - 0 10 Thousands/µL Final    POC Glucose 06/11/2022 166 (A) 65 - 140 mg/dl Final    POC Glucose 06/11/2022 314 (A) 65 - 140 mg/dl Final    POC Glucose 06/11/2022 154 (A) 65 - 140 mg/dl Final    POC Glucose 06/11/2022 235 (A) 65 - 140 mg/dl Final    Sodium 06/12/2022 138  135 - 147 mmol/L Final    Potassium 06/12/2022 4 0  3 5 - 5 3 mmol/L Final    Chloride 06/12/2022 95 (A) 96 - 108 mmol/L Final    CO2 06/12/2022 35 (A) 21 - 32 mmol/L Final    ANION GAP 06/12/2022 8  4 - 13 mmol/L Final    BUN 06/12/2022 97 (A) 5 - 25 mg/dL Final    Creatinine 06/12/2022 1 93 (A) 0 60 - 1 30 mg/dL Final    Standardized to IDMS reference method    Glucose 06/12/2022 184 (A) 65 - 140 mg/dL Final    If the patient is fasting, the ADA then defines impaired fasting glucose as > 100 mg/dL and diabetes as > or equal to 123 mg/dL  Specimen collection should occur prior to Sulfasalazine administration due to the potential for falsely depressed results  Specimen collection should occur prior to Sulfapyridine administration due to the potential for falsely elevated results      Calcium 06/12/2022 8 7  8 4 - 10 2 mg/dL Final  eGFR 06/12/2022 26  ml/min/1 73sq m Final    POC Glucose 06/12/2022 202 (A) 65 - 140 mg/dl Final    CRITICAL VALUE NOTED    POC Glucose 06/12/2022 267 (A) 65 - 140 mg/dl Final    POC Glucose 06/12/2022 264 (A) 65 - 140 mg/dl Final   Admission on 05/31/2022, Discharged on 05/31/2022   Component Date Value Ref Range Status    NT-proBNP 05/31/2022 1,859 (A) <125 pg/mL Final    hs TnI 0hr 05/31/2022 8  "Refer to ACS Flowchart"- see link ng/L Final    Comment:                                              Initial (time 0) result  If >=50 ng/L, Myocardial injury suggested ;  Type of myocardial injury and treatment strategy  to be determined  If 5-49 ng/L, a delta result at 2 hours and or 4 hours will be needed to further evaluate  If <4 ng/L, and chest pain has been >3 hours since onset, patient may qualify for discharge based on the HEART score in the ED  If <5 ng/L and <3hours since onset of chest pain, a delta result at 2 hours will be needed to further evaluate  HS Troponin 99th Percentile URL of a Health Population=12 ng/L with a 95% Confidence Interval of 8-18 ng/L  Second Troponin (time 2 hours)  If calculated delta >= 20 ng/L,  Myocardial injury suggested ; Type of myocardial injury and treatment strategy to be determined  If 5-49 ng/L and the calculated delta is 5-19 ng/L, consult medical service for evaluation  Continue evaluation for ischemia on ecg and other possible etiology and repeat hs troponin at 4 hours  If delta                            is <5 ng/L at 2 hours, consider discharge based on risk stratification via the HEART score (if in ED), or VASILE risk score in IP/Observation  HS Troponin 99th Percentile URL of a Health Population=12 ng/L with a 95% Confidence Interval of 8-18 ng/L   Sodium 05/31/2022 138  136 - 145 mmol/L Final    Potassium 05/31/2022 5 3  3 5 - 5 3 mmol/L Final    Slightly Hemolyzed;  Results May be Affected    Chloride 05/31/2022 102  100 - 108 mmol/L Final  CO2 05/31/2022 31  21 - 32 mmol/L Final    ANION GAP 05/31/2022 5  4 - 13 mmol/L Final    BUN 05/31/2022 64 (A) 5 - 25 mg/dL Final    Creatinine 05/31/2022 1 79 (A) 0 60 - 1 30 mg/dL Final    Standardized to IDMS reference method    Glucose 05/31/2022 246 (A) 65 - 140 mg/dL Final    If the patient is fasting, the ADA then defines impaired fasting glucose as > 100 mg/dL and diabetes as > or equal to 123 mg/dL  Specimen collection should occur prior to Sulfasalazine administration due to the potential for falsely depressed results  Specimen collection should occur prior to Sulfapyridine administration due to the potential for falsely elevated results   Calcium 05/31/2022 8 3  8 3 - 10 1 mg/dL Final    Corrected Calcium 05/31/2022 9 0  8 3 - 10 1 mg/dL Final    AST 05/31/2022 27  5 - 45 U/L Final    Slightly Hemolyzed; Results May be Affected  Specimen collection should occur prior to Sulfasalazine administration due to the potential for falsely depressed results   ALT 05/31/2022 20  12 - 78 U/L Final    Specimen collection should occur prior to Sulfasalazine administration due to the potential for falsely depressed results   Alkaline Phosphatase 05/31/2022 64  46 - 116 U/L Final    Total Protein 05/31/2022 7 3  6 4 - 8 2 g/dL Final    Albumin 05/31/2022 3 1 (A) 3 5 - 5 0 g/dL Final    Total Bilirubin 05/31/2022 0 26  0 20 - 1 00 mg/dL Final    Use of this assay is not recommended for patients undergoing treatment with eltrombopag due to the potential for falsely elevated results      eGFR 05/31/2022 28  ml/min/1 73sq m Final    Ventricular Rate 05/31/2022 69  BPM Final    Atrial Rate 05/31/2022 69  BPM Final    TN Interval 05/31/2022 192  ms Final    QRSD Interval 05/31/2022 80  ms Final    QT Interval 05/31/2022 400  ms Final    QTC Interval 05/31/2022 428  ms Final    P Axis 05/31/2022 5  degrees Final    QRS Axis 05/31/2022 -18  degrees Final    T Wave [place] 05/31/2022 24  degrees Final   Appointment on 05/31/2022   Component Date Value Ref Range Status    WBC 05/31/2022 7 28  4 31 - 10 16 Thousand/uL Final    RBC 05/31/2022 3 67 (A) 3 81 - 5 12 Million/uL Final    Hemoglobin 05/31/2022 10 3 (A) 11 5 - 15 4 g/dL Final    Hematocrit 05/31/2022 35 2  34 8 - 46 1 % Final    MCV 05/31/2022 96  82 - 98 fL Final    MCH 05/31/2022 28 1  26 8 - 34 3 pg Final    MCHC 05/31/2022 29 3 (A) 31 4 - 37 4 g/dL Final    RDW 05/31/2022 14 5  11 6 - 15 1 % Final    Platelets 26/79/1013 195  149 - 390 Thousands/uL Final    MPV 05/31/2022 11 9  8 9 - 12 7 fL Final    Sodium 05/31/2022 136  136 - 145 mmol/L Final    Potassium 05/31/2022 5 3  3 5 - 5 3 mmol/L Final    Slightly Hemolyzed; Results May be Affected&XA&Slightly Hemolyzed; Results May be Affected    Chloride 05/31/2022 104  100 - 108 mmol/L Final    CO2 05/31/2022 25  21 - 32 mmol/L Final    ANION GAP 05/31/2022 7  4 - 13 mmol/L Final    BUN 05/31/2022 59 (A) 5 - 25 mg/dL Final    Creatinine 05/31/2022 1 90 (A) 0 60 - 1 30 mg/dL Final    Standardized to IDMS reference method    Glucose 05/31/2022 241 (A) 65 - 140 mg/dL Final    If the patient is fasting, the ADA then defines impaired fasting glucose as > 100 mg/dL and diabetes as > or equal to 123 mg/dL  Specimen collection should occur prior to Sulfasalazine administration due to the potential for falsely depressed results  Specimen collection should occur prior to Sulfapyridine administration due to the potential for falsely elevated results   Calcium 05/31/2022 9 2  8 3 - 10 1 mg/dL Final    Corrected Calcium 05/31/2022 9 8  8 3 - 10 1 mg/dL Final    AST 05/31/2022 31  5 - 45 U/L Final    Slightly Hemolyzed; Results May be Affected&XA&Slightly Hemolyzed; Results May be Affected  Specimen collection should occur prior to Sulfasalazine administration due to the potential for falsely depressed results       ALT 05/31/2022 21  12 - 78 U/L Final    Specimen collection should occur prior to Sulfasalazine and/or Sulfapyridine administration due to the potential for falsely depressed results   Alkaline Phosphatase 05/31/2022 73  46 - 116 U/L Final    Total Protein 05/31/2022 7 8  6 4 - 8 2 g/dL Final    Albumin 05/31/2022 3 3 (A) 3 5 - 5 0 g/dL Final    Total Bilirubin 05/31/2022 0 30  0 20 - 1 00 mg/dL Final    Use of this assay is not recommended for patients undergoing treatment with eltrombopag due to the potential for falsely elevated results   eGFR 05/31/2022 26  ml/min/1 73sq m Final   No results displayed because visit has over 200 results        Admission on 05/13/2022, Discharged on 05/13/2022   Component Date Value Ref Range Status    WBC 05/13/2022 8 30  4 31 - 10 16 Thousand/uL Final    RBC 05/13/2022 3 30 (A) 3 81 - 5 12 Million/uL Final    Hemoglobin 05/13/2022 9 2 (A) 11 5 - 15 4 g/dL Final    Hematocrit 05/13/2022 31 4 (A) 34 8 - 46 1 % Final    MCV 05/13/2022 95  82 - 98 fL Final    MCH 05/13/2022 27 9  26 8 - 34 3 pg Final    MCHC 05/13/2022 29 3 (A) 31 4 - 37 4 g/dL Final    RDW 05/13/2022 14 3  11 6 - 15 1 % Final    MPV 05/13/2022 10 5  8 9 - 12 7 fL Final    Platelets 27/70/1139 174  149 - 390 Thousands/uL Final    nRBC 05/13/2022 0  /100 WBCs Final    Neutrophils Relative 05/13/2022 58  43 - 75 % Final    Immat GRANS % 05/13/2022 0  0 - 2 % Final    Lymphocytes Relative 05/13/2022 26  14 - 44 % Final    Monocytes Relative 05/13/2022 10  4 - 12 % Final    Eosinophils Relative 05/13/2022 5  0 - 6 % Final    Basophils Relative 05/13/2022 1  0 - 1 % Final    Neutrophils Absolute 05/13/2022 4 87  1 85 - 7 62 Thousands/µL Final    Immature Grans Absolute 05/13/2022 0 03  0 00 - 0 20 Thousand/uL Final    Lymphocytes Absolute 05/13/2022 2 12  0 60 - 4 47 Thousands/µL Final    Monocytes Absolute 05/13/2022 0 84  0 17 - 1 22 Thousand/µL Final    Eosinophils Absolute 05/13/2022 0 38  0 00 - 0 61 Thousand/µL Final    Basophils Absolute 05/13/2022 0 06 0 00 - 0 10 Thousands/µL Final    Protime 05/13/2022 14 8 (A) 11 6 - 14 5 seconds Final    INR 05/13/2022 1 18  0 84 - 1 19 Final    PTT 05/13/2022 33  23 - 37 seconds Final    Therapeutic Heparin Range =  60-90 seconds    Sodium 05/13/2022 141  136 - 145 mmol/L Final    Potassium 05/13/2022 4 4  3 5 - 5 3 mmol/L Final    Chloride 05/13/2022 99 (A) 100 - 108 mmol/L Final    CO2 05/13/2022 37 (A) 21 - 32 mmol/L Final    ANION GAP 05/13/2022 5  4 - 13 mmol/L Final    BUN 05/13/2022 36 (A) 5 - 25 mg/dL Final    Creatinine 05/13/2022 1 49 (A) 0 60 - 1 30 mg/dL Final    Standardized to IDMS reference method    Glucose 05/13/2022 282 (A) 65 - 140 mg/dL Final    If the patient is fasting, the ADA then defines impaired fasting glucose as > 100 mg/dL and diabetes as > or equal to 123 mg/dL  Specimen collection should occur prior to Sulfasalazine administration due to the potential for falsely depressed results  Specimen collection should occur prior to Sulfapyridine administration due to the potential for falsely elevated results   Calcium 05/13/2022 8 0 (A) 8 3 - 10 1 mg/dL Final    Corrected Calcium 05/13/2022 8 7  8 3 - 10 1 mg/dL Final    AST 05/13/2022 10  5 - 45 U/L Final    Specimen collection should occur prior to Sulfasalazine administration due to the potential for falsely depressed results   ALT 05/13/2022 15  12 - 78 U/L Final    Specimen collection should occur prior to Sulfasalazine administration due to the potential for falsely depressed results   Alkaline Phosphatase 05/13/2022 91  46 - 116 U/L Final    Total Protein 05/13/2022 7 4  6 4 - 8 2 g/dL Final    Albumin 05/13/2022 3 1 (A) 3 5 - 5 0 g/dL Final    Total Bilirubin 05/13/2022 0 19 (A) 0 20 - 1 00 mg/dL Final    Use of this assay is not recommended for patients undergoing treatment with eltrombopag due to the potential for falsely elevated results      eGFR 05/13/2022 35  ml/min/1 73sq m Final    Magnesium 05/13/2022 1 8 1 6 - 2 6 mg/dL Final    hs TnI 0hr 05/13/2022 9  "Refer to ACS Flowchart"- see link ng/L Final    Comment:                                              Initial (time 0) result  If >=50 ng/L, Myocardial injury suggested ;  Type of myocardial injury and treatment strategy  to be determined  If 5-49 ng/L, a delta result at 2 hours and or 4 hours will be needed to further evaluate  If <4 ng/L, and chest pain has been >3 hours since onset, patient may qualify for discharge based on the HEART score in the ED  If <5 ng/L and <3hours since onset of chest pain, a delta result at 2 hours will be needed to further evaluate  HS Troponin 99th Percentile URL of a Health Population=12 ng/L with a 95% Confidence Interval of 8-18 ng/L  Second Troponin (time 2 hours)  If calculated delta >= 20 ng/L,  Myocardial injury suggested ; Type of myocardial injury and treatment strategy to be determined  If 5-49 ng/L and the calculated delta is 5-19 ng/L, consult medical service for evaluation  Continue evaluation for ischemia on ecg and other possible etiology and repeat hs troponin at 4 hours  If delta                            is <5 ng/L at 2 hours, consider discharge based on risk stratification via the HEART score (if in ED), or VASILE risk score in IP/Observation  HS Troponin 99th Percentile URL of a Health Population=12 ng/L with a 95% Confidence Interval of 8-18 ng/L     NT-proBNP 05/13/2022 1,548 (A) <125 pg/mL Final    hs TnI 2hr 05/13/2022 10  "Refer to ACS Flowchart"- see link ng/L Final    Comment:                                              Initial (time 0) result  If >=50 ng/L, Myocardial injury suggested ;  Type of myocardial injury and treatment strategy  to be determined  If 5-49 ng/L, a delta result at 2 hours and or 4 hours will be needed to further evaluate  If <4 ng/L, and chest pain has been >3 hours since onset, patient may qualify for discharge based on the HEART score in the ED    If <5 ng/L and <3hours since onset of chest pain, a delta result at 2 hours will be needed to further evaluate  HS Troponin 99th Percentile URL of a Health Population=12 ng/L with a 95% Confidence Interval of 8-18 ng/L  Second Troponin (time 2 hours)  If calculated delta >= 20 ng/L,  Myocardial injury suggested ; Type of myocardial injury and treatment strategy to be determined  If 5-49 ng/L and the calculated delta is 5-19 ng/L, consult medical service for evaluation  Continue evaluation for ischemia on ecg and other possible etiology and repeat hs troponin at 4 hours  If delta                            is <5 ng/L at 2 hours, consider discharge based on risk stratification via the HEART score (if in ED), or VASILE risk score in IP/Observation  HS Troponin 99th Percentile URL of a Health Population=12 ng/L with a 95% Confidence Interval of 8-18 ng/L      Delta 2hr hsTnI 05/13/2022 1  <20 ng/L Final    Color, UA 05/13/2022 Yellow   Final    Clarity, UA 05/13/2022 Clear   Final    Specific [place], UA 05/13/2022 1 025  1 000 - 1 030 Final    pH, UA 05/13/2022 5 5  5 0, 5 5, 6 0, 6 5, 7 0, 7 5, 8 0, 8 5, 9 0 Final    Leukocytes, UA 05/13/2022 Trace (A) Negative Final    Nitrite, UA 05/13/2022 Negative  Negative Final    Protein, UA 05/13/2022 30 (1+) (A) Negative mg/dl Final    Glucose, UA 05/13/2022 Negative  Negative mg/dl Final    Ketones, UA 05/13/2022 Negative  Negative mg/dl Final    Urobilinogen, UA 05/13/2022 0 2  0 2, 1 0 E U /dl E U /dl Final    Bilirubin, UA 05/13/2022 Negative  Negative Final    Blood, UA 05/13/2022 Trace-Intact (A) Negative Final    RBC, UA 05/13/2022 1-2  None Seen, 0-1, 1-2, 2-4, 0-5 /hpf Final    WBC, UA 05/13/2022 20-30 (A) None Seen, 0-1, 1-2, 0-5, 2-4 /hpf Final    Epithelial Cells 05/13/2022 Moderate (A) None Seen, Occasional /hpf Final    Bacteria, UA 05/13/2022 Occasional  None Seen, Occasional /hpf Final    MUCUS THREADS 05/13/2022 Occasional (A) None Seen Final    Urine Culture 05/13/2022 50,000-59,000 cfu/ml    Final    Mixed Contaminants X4    Ventricular Rate 05/13/2022 85  BPM Final    Atrial Rate 05/13/2022 85  BPM Final    AK Interval 05/13/2022 186  ms Final    QRSD Interval 05/13/2022 72  ms Final    QT Interval 05/13/2022 372  ms Final    QTC Interval 05/13/2022 442  ms Final    P Axis 05/13/2022 56  degrees Final    QRS Axis 05/13/2022 8  degrees Final    T Wave Axis 05/13/2022 27  degrees Final         Patient Instructions   Call and had get appointment with the new cardiologist on July 6  Continue to weigh yourself daily  Of go for DEXA scan for osteoporosis at your convenience  Set up an appointment with nephrologist next week    I like to see you back in 3 weeks  Follow with Consultants as per their and our suggestion    Follow up in 3  week(s) or as needed earlier    Follow all instructions as advised and discussed  Take your medications as prescribed  Call the office immediately if you experience any side effects  Ask questions if you do not understand  Keep your scheduled appointment as advised or come sooner if necessary or in doubt  Best time to call for non-urgent matter or questions on weekdays is between 9am and 12 noon  See physician for any new symptoms or worsening of current symptoms  Urgent or emergent situations call 911 and report to nearest emergency room  I spent  45 minutes taking care of this patient including clinical care, conseling, collaboration, chart, lab and consultaion review  Patient is to get labs 06/16/2022 and 06/30/2022       Dr Traci Oscar MD  [facility]       "This note has been constructed using a voice recognition system  Therefore there may be syntax, spelling, and/or grammatical errors   Please call if you have any questions  "

## 2022-06-14 NOTE — ASSESSMENT & PLAN NOTE
Mild residual erythema on the right forearm  Finishing up antibiotic without diarrhea  Two more days of antibiotic to be done    Overall much better  Recommend to watch closely  Call if any worse  Finishing up antibiotic

## 2022-06-14 NOTE — ASSESSMENT & PLAN NOTE
Patient apparently was given a BiPAP previously for hypercapnia  Patient did have ABGs done during hospitalization May 18, 2022  At that time her pCO2 was 62 8  She was given a BiPAP  Patient reports that she has been able to use on a consistent basis due to her frequent hospitalizations  He is going to try to use once again  I have instructed for patient to try to begin to uses at least 4 hours a day even in the daytime  She is willing to do so  The goal is for her to increase usage then tonight time  Patient was given a BiPAP 12/6 during her last hospitalization in May of 2022  She is aware that oxygen was not ordered a 30% is a nocturnal pulse oximetry needs to be done    I will order this so that the next time she is able to use BiPAP successfully if possible for 4 hour she will use a pulse oximeter at that point

## 2022-06-14 NOTE — ASSESSMENT & PLAN NOTE
Status post surgery for the left humerus fracture by Orthopedic  Patient did not start physical therapy as she was in the hospital recently on couple of occasion  Paragraphs he will start physical therapy at her convenience  Paragraphs recommend to follow with Orthopedic  Will order DEXA scan  She claims that she had a DEXA scan to 3 years ago at another hospital and reportedly unremarkable

## 2022-06-14 NOTE — ASSESSMENT & PLAN NOTE
Unclear about history of KATRINA  Patient was told to have hypoventilation syndrome due to obesity  Patient is on BiPAP  Patient CO2 retention  Patient was told to have a thick tongue  Kendell Cartagena

## 2022-06-14 NOTE — ASSESSMENT & PLAN NOTE
Home blood sugar is fair  Currently on Trulicity 1 5 as well as Toujeo 25 units daily  No hypoglycemia  Oral intake reasonable  Last hemoglobin A1c was 6 8 will consider doing hemoglobin A1c in near future    Lab Results   Component Value Date    HGBA1C 6 8 (H) 03/09/2022 Patient arrives to [facility]. Psych Associate explains process and gives patient urine cup. Patient told about meeting with Mental Health  and Psychiatrist. Patient told about 2-5 hour time frame for complete evaluation.

## 2022-06-14 NOTE — PATIENT INSTRUCTIONS
Call and had get appointment with the new cardiologist on July 6  Continue to weigh yourself daily  Of go for DEXA scan for osteoporosis at your convenience  Set up an appointment with nephrologist next week    I like to see you back in 3 weeks  Follow with Consultants as per their and our suggestion    Follow up in 3  week(s) or as needed earlier    Follow all instructions as advised and discussed  Take your medications as prescribed  Call the office immediately if you experience any side effects  Ask questions if you do not understand  Keep your scheduled appointment as advised or come sooner if necessary or in doubt  Best time to call for non-urgent matter or questions on weekdays is between 9am and 12 noon  See physician for any new symptoms or worsening of current symptoms  Urgent or emergent situations call 911 and report to nearest emergency room  I spent  45 minutes taking care of this patient including clinical care, conseling, collaboration, chart, lab and consultaion review      Patient is to get labs 06/16/2022 and 06/30/2022

## 2022-06-14 NOTE — PROGRESS NOTES
BMI Counseling: Body mass index is 45 54 kg/m²  The BMI is above normal  Nutrition recommendations include decreasing portion sizes, encouraging healthy choices of fruits and vegetables, decreasing fast food intake, consuming healthier snacks, moderation in carbohydrate intake and increasing intake of lean protein  No pharmacotherapy was ordered  Rationale for BMI follow-up plan is due to patient being overweight or obese  Depression Screening and Follow-up Plan: Patient was screened for depression during today's encounter  They screened negative with a PHQ-2 score of 0

## 2022-06-14 NOTE — ASSESSMENT & PLAN NOTE
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

## 2022-06-14 NOTE — ASSESSMENT & PLAN NOTE
Wt Readings from Last 3 Encounters:   06/14/22 113 kg (249 lb)   06/14/22 113 kg (249 lb)   06/12/22 113 kg (248 lb 12 8 oz)       Admission weight 155 discharge weight 246  Currently back on torsemide 20 mg daily tolerating fairly well  The patient knows how to control diet particularly low-salt carbohydrate control as well as daily weight  Patient to be seen by cardiologist   The patient has normal ejection fraction  Chest x-ray revealed improved CHF  Clinically sees on room air oxygen at this time  Patient is compensated  Mild edema is acceptable  Patient has new cardiologist   How patient is at risk of decompensation  Ace and arbs are contraindicated due to renal function  The patient's cardiac status is complicated by chronic kidney disease home  Some degree of prerenal azotemia to be acceptable  Recommend to closely follow with cardiologist and nephrologist   Earliest appointment she could get with cardiologist is on July 6  Recommend daily weight  Patient will follow closely weight  Currently remains on torsemide 20 mg daily    May require additional diuretic such as spironolactone or Zaroxolyn as we go will the make further decision per cardiologist and clinical status

## 2022-06-14 NOTE — ASSESSMENT & PLAN NOTE
Hemoglobin is 10 2  Multifactorial anemia of chronic disease will monitor  Recommend CBC back in 2 weeks

## 2022-06-16 ENCOUNTER — LAB (OUTPATIENT)
Dept: LAB | Facility: CLINIC | Age: 72
End: 2022-06-16
Payer: MEDICARE

## 2022-06-16 DIAGNOSIS — I10 ESSENTIAL HYPERTENSION: ICD-10-CM

## 2022-06-16 DIAGNOSIS — J96.22 ACUTE ON CHRONIC RESPIRATORY FAILURE WITH HYPOXIA AND HYPERCAPNIA (HCC): ICD-10-CM

## 2022-06-16 DIAGNOSIS — E78.2 MIXED HYPERLIPIDEMIA: ICD-10-CM

## 2022-06-16 DIAGNOSIS — R79.89 ELEVATED SERUM CREATININE: ICD-10-CM

## 2022-06-16 DIAGNOSIS — L89.156 PRESSURE INJURY OF DEEP TISSUE OF SACRAL REGION: ICD-10-CM

## 2022-06-16 DIAGNOSIS — D64.9 ANEMIA, UNSPECIFIED TYPE: ICD-10-CM

## 2022-06-16 DIAGNOSIS — R60.0 LOWER LEG EDEMA: ICD-10-CM

## 2022-06-16 DIAGNOSIS — I80.8 THROMBOPHLEBITIS OF ARM, RIGHT: ICD-10-CM

## 2022-06-16 DIAGNOSIS — E66.2 OBESITY HYPOVENTILATION SYNDROME (HCC): ICD-10-CM

## 2022-06-16 DIAGNOSIS — E11.00 TYPE 2 DIABETES MELLITUS WITH HYPEROSMOLARITY WITHOUT COMA, WITHOUT LONG-TERM CURRENT USE OF INSULIN (HCC): ICD-10-CM

## 2022-06-16 DIAGNOSIS — J96.21 ACUTE ON CHRONIC RESPIRATORY FAILURE WITH HYPOXIA AND HYPERCAPNIA (HCC): ICD-10-CM

## 2022-06-16 LAB
ANION GAP SERPL CALCULATED.3IONS-SCNC: 6 MMOL/L (ref 4–13)
BACTERIA BLD CULT: NORMAL
BACTERIA BLD CULT: NORMAL
BUN SERPL-MCNC: 77 MG/DL (ref 5–25)
CALCIUM SERPL-MCNC: 9.5 MG/DL (ref 8.3–10.1)
CHLORIDE SERPL-SCNC: 101 MMOL/L (ref 100–108)
CO2 SERPL-SCNC: 32 MMOL/L (ref 21–32)
CREAT SERPL-MCNC: 1.62 MG/DL (ref 0.6–1.3)
ERYTHROCYTE [DISTWIDTH] IN BLOOD BY AUTOMATED COUNT: 14.2 % (ref 11.6–15.1)
GFR SERPL CREATININE-BSD FRML MDRD: 32 ML/MIN/1.73SQ M
GLUCOSE P FAST SERPL-MCNC: 223 MG/DL (ref 65–99)
HCT VFR BLD AUTO: 35.4 % (ref 34.8–46.1)
HGB BLD-MCNC: 10.5 G/DL (ref 11.5–15.4)
MCH RBC QN AUTO: 27.5 PG (ref 26.8–34.3)
MCHC RBC AUTO-ENTMCNC: 29.7 G/DL (ref 31.4–37.4)
MCV RBC AUTO: 93 FL (ref 82–98)
PLATELET # BLD AUTO: 186 THOUSANDS/UL (ref 149–390)
PMV BLD AUTO: 11.7 FL (ref 8.9–12.7)
POTASSIUM SERPL-SCNC: 4.7 MMOL/L (ref 3.5–5.3)
RBC # BLD AUTO: 3.82 MILLION/UL (ref 3.81–5.12)
SODIUM SERPL-SCNC: 139 MMOL/L (ref 136–145)
WBC # BLD AUTO: 6.75 THOUSAND/UL (ref 4.31–10.16)

## 2022-06-16 PROCEDURE — 80048 BASIC METABOLIC PNL TOTAL CA: CPT

## 2022-06-16 PROCEDURE — 36415 COLL VENOUS BLD VENIPUNCTURE: CPT

## 2022-06-16 PROCEDURE — 85027 COMPLETE CBC AUTOMATED: CPT

## 2022-06-17 ENCOUNTER — TELEMEDICINE (OUTPATIENT)
Dept: INTERNAL MEDICINE CLINIC | Facility: CLINIC | Age: 72
End: 2022-06-17
Payer: MEDICARE

## 2022-06-17 VITALS
OXYGEN SATURATION: 94 % | DIASTOLIC BLOOD PRESSURE: 71 MMHG | WEIGHT: 245 LBS | SYSTOLIC BLOOD PRESSURE: 140 MMHG | HEIGHT: 62 IN | HEART RATE: 80 BPM | BODY MASS INDEX: 45.08 KG/M2

## 2022-06-17 DIAGNOSIS — Z00.00 MEDICARE ANNUAL WELLNESS VISIT, SUBSEQUENT: Primary | ICD-10-CM

## 2022-06-17 PROCEDURE — G0438 PPPS, INITIAL VISIT: HCPCS | Performed by: INTERNAL MEDICINE

## 2022-06-17 NOTE — PATIENT INSTRUCTIONS
Medicare Preventive Visit Patient Instructions  Thank you for completing your Welcome to Medicare Visit or Medicare Annual Wellness Visit today  Your next wellness visit will be due in one year (6/18/2023)  The screening/preventive services that you may require over the next 5-10 years are detailed below  Some tests may not apply to you based off risk factors and/or age  Screening tests ordered at today's visit but not completed yet may show as past due  Also, please note that scanned in results may not display below  Preventive Screenings:  Service Recommendations Previous Testing/Comments   Colorectal Cancer Screening  * Colonoscopy    * Fecal Occult Blood Test (FOBT)/Fecal Immunochemical Test (FIT)  * Fecal DNA/Cologuard Test  * Flexible Sigmoidoscopy Age: 54-65 years old   Colonoscopy: every 10 years (may be performed more frequently if at higher risk)  OR  FOBT/FIT: every 1 year  OR  Cologuard: every 3 years  OR  Sigmoidoscopy: every 5 years  Screening may be recommended earlier than age 48 if at higher risk for colorectal cancer  Also, an individualized decision between you and your healthcare provider will decide whether screening between the ages of 74-80 would be appropriate  Colonoscopy: 03/07/2022  FOBT/FIT: 03/07/2022  Cologuard: Not on file  Sigmoidoscopy: Not on file          Breast Cancer Screening Age: 36 years old  Frequency: every 1-2 years  Not required if history of left and right mastectomy Mammogram: Not on file        Cervical Cancer Screening Between the ages of 21-29, pap smear recommended once every 3 years  Between the ages of 33-67, can perform pap smear with HPV co-testing every 5 years     Recommendations may differ for women with a history of total hysterectomy, cervical cancer, or abnormal pap smears in past  Pap Smear: Not on file        Hepatitis C Screening Once for adults born between [facility]  More frequently in patients at high risk for Hepatitis C Hep C Antibody: Not on file        Diabetes Screening 1-2 times per year if you're at risk for diabetes or have pre-diabetes Fasting glucose: 223 mg/dL   A1C: 6 8 %        Cholesterol Screening Once every 5 years if you don't have a lipid disorder  May order more often based on risk factors  Lipid panel: 03/10/2022          Other Preventive Screenings Covered by Medicare:  1  Abdominal Aortic Aneurysm (AAA) Screening: covered once if your at risk  You're considered to be at risk if you have a family history of AAA  2  Lung Cancer Screening: covers low dose CT scan once per year if you meet all of the following conditions: (1) Age 50-69; (2) No signs or symptoms of lung cancer; (3) Current smoker or have quit smoking within the last 15 years; (4) You have a tobacco smoking history of at least 30 pack years (packs per day multiplied by number of years you smoked); (5) You get a written order from a healthcare provider  3  Glaucoma Screening: covered annually if you're considered high risk: (1) You have diabetes OR (2) Family history of glaucoma OR (3)  aged 48 and older OR (3)  American aged 72 and older  3  Osteoporosis Screening: covered every 2 years if you meet one of the following conditions: (1) You're estrogen deficient and at risk for osteoporosis based off medical history and other findings; (2) Have a vertebral abnormality; (3) On glucocorticoid therapy for more than 3 months; (4) Have primary hyperparathyroidism; (5) On osteoporosis medications and need to assess response to drug therapy  · Last bone density test (DXA Scan): Not on file  5  HIV Screening: covered annually if you're between the age of 12-76  Also covered annually if you are younger than 13 and older than 72 with risk factors for HIV infection  For pregnant patients, it is covered up to 3 times per pregnancy      Immunizations:  Immunization Recommendations   Influenza Vaccine Annual influenza vaccination during flu season is recommended for all persons aged >= 6 months who do not have contraindications   Pneumococcal Vaccine (Prevnar and Pneumovax)  * Prevnar = PCV13  * Pneumovax = PPSV23   Adults 25-60 years old: 1-3 doses may be recommended based on certain risk factors  Adults 72 years old: Prevnar (PCV13) vaccine recommended followed by Pneumovax (PPSV23) vaccine  If already received PPSV23 since turning 65, then PCV13 recommended at least one year after PPSV23 dose  Hepatitis B Vaccine 3 dose series if at intermediate or high risk (ex: diabetes, end stage renal disease, liver disease)   Tetanus (Td) Vaccine - COST NOT COVERED BY MEDICARE PART B Following completion of primary series, a booster dose should be given every 10 years to maintain immunity against tetanus  Td may also be given as tetanus wound prophylaxis  Tdap Vaccine - COST NOT COVERED BY MEDICARE PART B Recommended at least once for all adults  For pregnant patients, recommended with each pregnancy  Shingles Vaccine (Shingrix) - COST NOT COVERED BY MEDICARE PART B  2 shot series recommended in those aged 48 and above     Health Maintenance Due:      Topic Date Due    Hepatitis C Screening  Never done    Breast Cancer Screening: Mammogram  Never done    Colorectal Cancer Screening  03/07/2023     Immunizations Due:      Topic Date Due    Pneumococcal Vaccine: 65+ Years (1 - PCV) Never done    DTaP,Tdap,and Td Vaccines (1 - Tdap) Never done    COVID-19 Vaccine (2 - Moderna series) 03/11/2022    Influenza Vaccine (Season Ended) 09/01/2022     Advance Directives   What are advance directives? Advance directives are legal documents that state your wishes and plans for medical care  These plans are made ahead of time in case you lose your ability to make decisions for yourself  Advance directives can apply to any medical decision, such as the treatments you want, and if you want to donate organs  What are the types of advance directives?   There are many types of advance directives, and each state has rules about how to use them  You may choose a combination of any of the following:  · Living will: This is a written record of the treatment you want  You can also choose which treatments you do not want, which to limit, and which to stop at a certain time  This includes surgery, medicine, IV fluid, and tube feedings  · Durable power of  for healthcare [facility]): This is a written record that states who you want to make healthcare choices for you when you are unable to make them for yourself  This person, called a proxy, is usually a family member or a friend  You may choose more than 1 proxy  · Do not resuscitate (DNR) order:  A DNR order is used in case your heart stops beating or you stop breathing  It is a request not to have certain forms of treatment, such as CPR  A DNR order may be included in other types of advance directives  · Medical directive: This covers the care that you want if you are in a coma, near death, or unable to make decisions for yourself  You can list the treatments you want for each condition  Treatment may include pain medicine, surgery, blood transfusions, dialysis, IV or tube feedings, and a ventilator (breathing machine)  · Values history: This document has questions about your views, beliefs, and how you feel and think about life  This information can help others choose the care that you would choose  Why are advance directives important? An advance directive helps you control your care  Although spoken wishes may be used, it is better to have your wishes written down  Spoken wishes can be misunderstood, or not followed  Treatments may be given even if you do not want them  An advance directive may make it easier for your family to make difficult choices about your care     Weight Management   Why it is important to manage your weight:  Being overweight increases your risk of health conditions such as heart disease, high blood pressure, type 2 diabetes, and certain types of cancer  It can also increase your risk for osteoarthritis, sleep apnea, and other respiratory problems  Aim for a slow, steady weight loss  Even a small amount of weight loss can lower your risk of health problems  How to lose weight safely:  A safe and healthy way to lose weight is to eat fewer calories and get regular exercise  You can lose up about 1 pound a week by decreasing the number of calories you eat by 500 calories each day  Healthy meal plan for weight management:  A healthy meal plan includes a variety of foods, contains fewer calories, and helps you stay healthy  A healthy meal plan includes the following:  · Eat whole-grain foods more often  A healthy meal plan should contain fiber  Fiber is the part of grains, fruits, and vegetables that is not broken down by your body  Whole-grain foods are healthy and provide extra fiber in your diet  Some examples of whole-grain foods are whole-wheat breads and pastas, oatmeal, brown rice, and bulgur  · Eat a variety of vegetables every day  Include dark, leafy greens such as spinach, kale, isaac greens, and mustard greens  Eat yellow and orange vegetables such as carrots, sweet potatoes, and winter squash  · Eat a variety of fruits every day  Choose fresh or canned fruit (canned in its own juice or light syrup) instead of juice  Fruit juice has very little or no fiber  · Eat low-fat dairy foods  Drink fat-free (skim) milk or 1% milk  Eat fat-free yogurt and low-fat cottage cheese  Try low-fat cheeses such as mozzarella and other reduced-fat cheeses  · Choose meat and other protein foods that are low in fat  Choose beans or other legumes such as split peas or lentils  Choose fish, skinless poultry (chicken or turkey), or lean cuts of red meat (beef or pork)  Before you cook meat or poultry, cut off any visible fat  · Use less fat and oil  Try baking foods instead of frying them   Add less fat, such as margarine, sour cream, regular salad dressing and mayonnaise to foods  Eat fewer high-fat foods  Some examples of high-fat foods include french fries, doughnuts, ice cream, and cakes  · Eat fewer sweets  Limit foods and drinks that are high in sugar  This includes candy, cookies, regular soda, and sweetened drinks  Exercise:  Exercise at least 30 minutes per day on most days of the week  Some examples of exercise include walking, biking, dancing, and swimming  You can also fit in more physical activity by taking the stairs instead of the elevator or parking farther away from stores  Ask your healthcare provider about the best exercise plan for you  © Copyright [employer] 2018 Information is for End User's use only and may not be sold, redistributed or otherwise used for commercial purposes   All illustrations and images included in CareNotes® are the copyrighted property of [employer]  or [address] [place] [address]

## 2022-06-17 NOTE — PROGRESS NOTES
Virtual AWV Consent    Verification of patient location:    Patient is located in the following [facility] in which I hold an active license [place]    Reason for visit is AWV    Encounter provider Ian Vanegas MD    Provider located at [address] [address] [phone]      Recent Visits  Date Type Provider Dept   06/14/22 Office Visit Ian Vanegas MD [facility]   Showing recent visits within past 7 days and meeting all other requirements  Today's Visits  Date Type Provider Dept   06/17/22 Telemedicine Ian Vanegas MD [facility]   Showing today's visits and meeting all other requirements  Future Appointments  No visits were found meeting these conditions  Showing future appointments within next 150 days and meeting all other requirements       After connecting through [employer], the patient was identified by name and date of birth  Jackelyn Chavez was informed that this is a telemedicine visit and that the visit is being conducted through Telephone  My office door was closed  No one else was in the room  She acknowledged consent and understanding of privacy and security of the video platform  Jackelyn Chavez verbally agrees to participate in [employer]  Pt is aware that [employer] could be limited without vital signs or the ability to perform a full hands-on physical Jael Speed understands she or the provider may request at any time to terminate the video visit and request the patient to seek care or treatment in person  Patient is aware this is a billable service     Assessment and Plan:     Problem List Items Addressed This Visit    None     Visit Diagnoses     Medicare annual wellness visit, subsequent    -  Primary           Preventive health issues were discussed with patient, and age appropriate screening tests were ordered as noted in patient's After Visit Summary  Personalized health advice and appropriate referrals for health education or preventive services given if needed, as noted in patient's After Visit Summary       History of Present Illness:     Patient presents for a Medicare Wellness Visit    HPI   Patient Care Team:  Gemma Osman MD as PCP - General (Family Medicine)     Review of Systems:     Review of Systems     Problem List:     Patient Active Problem List   Diagnosis    Asthma    Morbid obesity with BMI of 45 0-49 9, adult ([address] )    Lower leg edema    Paroxysmal atrial fibrillation (HCC)    Mixed hyperlipidemia    Anemia    Essential hypertension    Humeral head fracture    PONV (postoperative nausea and vomiting)    Diabetes mellitus, type 2 ([address] )    Gastroesophageal reflux disease    Nephrolithiasis    Arthritis    S/P total knee replacement, right    On continuous oral anticoagulation - eliquis    Closed fracture of proximal end of left humerus with routine healing    Status post reverse total replacement of left shoulder    Acute kidney injury on CKD stage 3    Peripheral venous insufficiency    Post-herpetic polyneuropathy    Raynaud's disease    Lung nodule < 6cm on CT    Acute on chronic diastolic CHF (congestive heart failure) (HCC)    Pulmonary nodules    Lump of left breast    Pressure injury of deep tissue of sacral region    Morbid obesity ([address] )    Pure hypercholesterolemia    Acute on chronic respiratory failure with hypoxia and hypercapnia (HCC)    Obesity hypoventilation syndrome (HCC)    KATRINA (obstructive sleep apnea)    Elevated serum creatinine    Thrombophlebitis of arm, right    Chronic respiratory failure with hypoxia and hypercapnia (HCC)    Platelets decreased ([address] )      Past Medical and Surgical History:     Past Medical History:   Diagnosis Date    Anemia     Asthma     COVID-19     January 2022    GERD (gastroesophageal reflux disease)  Hyperlipidemia     PONV (postoperative nausea and vomiting)     SVT [facility]supraventricular tachycardia) [facility])      Past Surgical History:   Procedure Laterality Date    APPENDECTOMY      CYSTOSCOPY W/ LASER LITHOTRIPSY Left 2016    Procedure: CYSTOSCOPY URETEROSCOPY WITH LITHOTRIPSY HOLMIUM LASER, RETROGRADE PYELOGRAM AND INSERTION STENT URETERAL;  Surgeon: Milford Hammans, MD;  Location: [address] [place] [address];  Service:     DILATION AND CURETTAGE OF UTERUS      IR THORACENTESIS  3/18/2022    JOINT REPLACEMENT      right knee    KNEE ARTHROPLASTY Right     DC CYSTOURETHROSCOPY,URETER CATHETER Left 2016    Procedure: CYSTOSCOPY RETROGRADE PYELOGRAM WITH INSERTION STENT URETERAL, left;  Surgeon: Milford Hammans, MD;  Location: [facility] OR;  Service: Urology    DC RECONSTR TOTAL SHOULDER IMPLANT Left 3/1/2022    Procedure: ARTHROPLASTY SHOULDER REVERSE;  Surgeon: Uvaldo Jeter MD;  Location: [facility];  Service: Orthopedics    SHOULDER ARTHROTOMY Left       Family History:     Family History   Problem Relation Age of Onset    Heart disease Mother     Emphysema Maternal Grandmother     Heart disease Family       Social History:     Social History     Socioeconomic History    Marital status: Single     Spouse name: None    Number of children: None    Years of education: None    Highest education level: None   Occupational History    None   Tobacco Use    Smoking status: Former Smoker     Packs/day: 1 00     Years: 20 00     Pack years: 20 00     Types: Cigarettes     Quit date: 1996     Years since quittin 9    Smokeless tobacco: Never Used   Vaping Use    Vaping Use: Never used   Substance and Sexual Activity    Alcohol use: Not Currently     Comment: rarely    Drug use: No    Sexual activity: Not Currently   Other Topics Concern    None   Social History Narrative    None     Social Determinants of Health     Financial Resource Strain: Not on file   Food Insecurity: No Food Insecurity    Worried About Running Out of Food in the Last Year: Never true    Wes of Food in the Last Year: Never true   Transportation Needs: No Transportation Needs    Lack of Transportation (Medical): No    Lack of Transportation (Non-Medical): No   Physical Activity: Not on file   Stress: Not on file   Social Connections: Not on file   Intimate Partner Violence: Not on file   Housing Stability: Low Risk     Unable to Pay for Housing in the Last Year: No    Number of Places Lived in the Last Year: 2    Unstable Housing in the Last Year: No      Medications and Allergies:     Current Outpatient Medications   Medication Sig Dispense Refill    acetaminophen (TYLENOL) 325 mg tablet Take 650 mg by mouth every 6 (six) hours as needed for mild pain        albuterol (PROVENTIL HFA,VENTOLIN HFA) 90 mcg/act inhaler Inhale 2 puffs every 6 (six) hours as needed for wheezing 8 g 1    ammonium lactate (LAC-HYDRIN) 12 % lotion APPLY TOPICALLY TO AFFECTED AREAS twice a day      apixaban (ELIQUIS) 5 mg Take 1 tablet (5 mg total) by mouth in the morning and 1 tablet (5 mg total) in the evening  180 tablet 1    b complex-vitamin C-folic acid (NEPHROCAPS) 1 mg capsule Take 1 capsule by mouth daily 30 capsule 5    cephalexin (KEFLEX) 500 mg capsule Take 1 capsule (500 mg total) by mouth every 8 (eight) hours for 7 days 21 capsule 0    docusate sodium (COLACE) 100 mg capsule Take 100 mg by mouth in the morning      glucose blood (OneTouch Ultra) test strip Test twice daily   100 strip 2    insulin lispro (HumaLOG KwikPen) 100 units/mL injection pen 3 times with meal according to sliding scale - >Blood Glucose 150 - 199: 2 Unit of Insulin, Blood Glucose 200 - 249: 4 Units of Insulin, Blood Glucose 250 - 299: 6 Units of Insulin, Blood Glucose 300 - 349: 8 Units of Insulin, Blood Glucose 350 - 399: 10 Units of Inuslin,Blood Glucose greater than or equal to 400: 12 Units of Insulin-please contact your physician 15 mL 0    Insulin Pen Needle (BD Pen Needle Pilar 2nd Gen) 32G X 4 MM MISC For use with insulin pen  Pharmacy may dispense brand covered by insurance  100 each 0    Insulin Pen Needle (NovoFine Autocover) 30G X 8 MM MISC Inject under the skin daily 100 each 3    Insulin Pen Needle 30G X 8 MM MISC 2 (two) times a day      Lancets (onetouch ultrasoft) lancets Use once daily 100 each 2    metoprolol tartrate (LOPRESSOR) 50 mg tablet Take 1 tablet (50 mg total) by mouth every 12 (twelve) hours 180 tablet 1    montelukast (SINGULAIR) 10 mg tablet Take 10 mg by mouth daily        nystatin (MYCOSTATIN) powder Apply topically 2 (two) times a day      pregabalin (LYRICA) 100 mg capsule Take 1 capsule (100 mg total) by mouth 2 (two) times a day 180 capsule 1    rosuvastatin (CRESTOR) 10 MG tablet 10 mg daily    0    sodium chloride (OCEAN) 0 65 % nasal spray 1 spray into each nostril every hour as needed for congestion  0    torsemide (DEMADEX) 20 mg tablet Take 1 tablet (20 mg total) by mouth every morning 90 tablet 1    Toujeo SoloStar 300 units/mL CONCENTRATED U-300 injection pen (1-unit dial) Inject 25 Units under the skin daily at bedtime 4 5 mL 0    Trulicity 1 5 QJ/6 6JJ SOPN inject 0 5 milliliter subcutaneously every week 28       No current facility-administered medications for this visit       Allergies   Allergen Reactions    Penicillins Hives    Moxifloxacin Other (See Comments)     unknown    Percocet [Oxycodone-Acetaminophen] Other (See Comments)     unknown    Zinc Acetate Other (See Comments)     unknown    Asa [Aspirin] GI Intolerance    Indocin [Indomethacin] Other (See Comments)     Made patient "loopy"    Other Other (See Comments)     unknown      Immunizations:     Immunization History   Administered Date(s) Administered    COVID-19 MODERNA VACC 0 5 ML IM 02/11/2022      Health Maintenance:         Topic Date Due    Hepatitis C Screening  Never done    Breast Cancer Screening: Mammogram  Never done    Colorectal Cancer Screening  03/07/2023         Topic Date Due    Pneumococcal Vaccine: 65+ Years (1 - PCV) Never done    DTaP,Tdap,and Td Vaccines (1 - Tdap) Never done    COVID-19 Vaccine (2 - Moderna series) 03/11/2022    Influenza Vaccine (Season Ended) 09/01/2022      Medicare Screening Tests and Risk Assessments:     Luis Lucero is here for her Initial Wellness visit  Health Risk Assessment:   Patient rates overall health as good  Patient feels that their physical health rating is slightly worse  Patient is satisfied with their life  Eyesight was rated as slightly worse  Hearing was rated as same  Patient feels that their emotional and mental health rating is same  Patients states they are never, rarely angry  Patient states they are sometimes unusually tired/fatigued  Pain experienced in the last 7 days has been none  Patient states that she has experienced no weight loss or gain in last 6 months  Weight stays about the same  She watches her fluid  Depression Screening:   PHQ-2 Score: 0      Fall Risk Screening: In the past year, patient has experienced: history of falling in past year    Number of falls: 1  Injured during fall?: Yes    Feels unsteady when standing or walking?: No    Worried about falling?: No      Urinary Incontinence Screening:   Patient has not leaked urine accidently in the last six months  No issues    Home Safety:  Patient has trouble with stairs inside or outside of their home  Patient has working smoke alarms and has working carbon monoxide detector  Home safety hazards include: none  No home hazards  Lives with niece  Feels safe in her floor  All one floor  Nutrition:   Current diet is Regular  Eats a blanced diet  She watches her sodium intake  Medications:   Patient is currently taking over-the-counter supplements  OTC medications include: see medication list  Patient is able to manage medications   Her niece gets them ready everday  Activities of Daily Living (ADLs)/Instrumental Activities of Daily Living (IADLs):   Walk and transfer into and out of bed and chair?: Yes  Dress and groom yourself?: Yes    Bathe or shower yourself?: No    Feed yourself? Yes  Do your laundry/housekeeping?: Yes  Manage your money, pay your bills and track your expenses?: Yes  Make your own meals?: Yes    Do your own shopping?: No    ADL comments: She does get some help from her her niece with things  Previous Hospitalizations:   Any hospitalizations or ED visits within the last 12 months?: Yes    How many hospitalizations have you had in the last year?: 3-4    Hospitalization Comments: She is in and oput of the hospital for CHF    Advance Care Planning:   Living will: Yes    Durable POA for healthcare: No    Advanced directive: Yes    Advanced directive counseling given: Yes    Five wishes given: Yes    Patient declined ACP directive: No    End of Life Decisions reviewed with patient: Yes    Provider agrees with end of life decisions: Yes      Comments: She has a living will in place  Cognitive Screening:   Provider or family/friend/caregiver concerned regarding cognition?: No    PREVENTIVE SCREENINGS      Cardiovascular Screening:    General: Screening Not Indicated and History Lipid Disorder      Diabetes Screening:     General: Screening Not Indicated and History Diabetes      Colorectal Cancer Screening:     General: Screening Current      Breast Cancer Screening:     General: Risks and Benefits Discussed and Patient Declines      Cervical Cancer Screening:    General: Screening Not Indicated      Osteoporosis Screening:    General: Patient Declines      Lung Cancer Screening:     General: Screening Not Indicated      Hepatitis C Screening:    General: Patient Declines and Risks and Benefits Discussed    Hep C Screening Accepted: No       Preventive Screening Comments: Pt has no risk for HEP C  Rec  Eye appt  Consider flu and pneumonia vax   Is up to date  Get Covid booster anytime  Screening, Brief Intervention, and Referral to Treatment (SBIRT)    Screening  Typical number of drinks in a day: 0  Typical number of drinks in a week: 0  Interpretation: Low risk drinking behavior  AUDIT-C Screenin) How often did you have a drink containing alcohol in the past year? never  2) How many drinks did you have on a typical day when you were drinking in the past year? 0  3) How often did you have 6 or more drinks on one occasion in the past year? never    AUDIT-C Score: 0  Interpretation: Score 0-2 (female): Negative screen for alcohol misuse    Single Item Drug Screening:  How often have you used an illegal drug (including marijuana) or a prescription medication for non-medical reasons in the past year? never    Single Item Drug Screen Score: 0  Interpretation: Negative screen for possible drug use disorder    Brief Intervention  Alcohol & drug use screenings were reviewed  No concerns regarding substance use disorder identified  Other Counseling Topics:   Car/seat belt/driving safety, skin self-exam, sunscreen and calcium and vitamin D intake and regular weightbearing exercise       No exam data present     Physical Exam:     /71   Pulse 80   Ht 5' 2" (1 575 m)   Wt 111 kg (245 lb)   SpO2 94%   BMI 44 81 kg/m²     Physical Exam     Mely Reddy MD

## 2022-06-20 ENCOUNTER — TELEPHONE (OUTPATIENT)
Dept: NEPHROLOGY | Facility: CLINIC | Age: 72
End: 2022-06-20

## 2022-06-20 NOTE — TELEPHONE ENCOUNTER
Spoke with patient letting her know her kidney function is stable and there are no changes at this time  She expressed understanding and thanked us for the call  - - -

## 2022-06-20 NOTE — TELEPHONE ENCOUNTER
----- Message from Natan sent at 6/16/2022  5:42 PM EDT -----  Please let Christelle Boss know that labs were reviewed  Kidney function is stable at this time  Follow-up with Dr Narcisa Julian

## 2022-06-23 ENCOUNTER — EPISODE CHANGES (OUTPATIENT)
Dept: CASE MANAGEMENT | Facility: OTHER | Age: 72
End: 2022-06-23

## 2022-06-27 ENCOUNTER — PATIENT OUTREACH (OUTPATIENT)
Dept: INTERNAL MEDICINE CLINIC | Facility: CLINIC | Age: 72
End: 2022-06-27

## 2022-06-27 NOTE — PROGRESS NOTES
Chart review completed for the following sections:   Recent Vital Signs   Allergies/Contradictions   Medication Review    History    [facility]    Problem List   Immunizations   Past hospitalizations and major procedures, including surgery   Significant past illnesses and treatment history including: History and Physical and Other provider notes   Relevant past medications related to the patient's condition    Outreach to patient  Introduced role in managing her care  Pt reports that [facility] has resumed services  Pt is receiving skilled nursing at this time  Pt is well versed in managing her heart failure  Pt weighs herself daily and knows when to call the provider  Pt is taking medications as directed  Pts niece drives her to all of her appointments  Pt unable to record CM contact information at this time  Agreeable to monthly outreaches

## 2022-07-03 PROBLEM — I50.33 ACUTE ON CHRONIC DIASTOLIC CHF (CONGESTIVE HEART FAILURE) (HCC): Status: RESOLVED | Noted: 2022-03-29 | Resolved: 2022-07-03

## 2022-07-03 PROBLEM — E78.00 PURE HYPERCHOLESTEROLEMIA: Status: RESOLVED | Noted: 2022-04-22 | Resolved: 2022-07-03

## 2022-07-03 NOTE — PROGRESS NOTES
Cardiology  Heart Failure   Follow Up Office Visit Note     Faheem Lemus   73 y o    female   MRN: [id number]  [address]  [address] [place] [address] [id number] [phone]    PCP: Eladio Alxeander MD  Cardiologist :  Will be Dr Cesar Honeycutt request  Prior:  Dr Mahesh García            Summary of Recommendations  Low-sodium diet, Heart failure education as below  She has [facility] heart failure booklet  Today, no further metoprolol  Change metoprolol tartrate 50 mg q12 h to metoprolol succinate 50 mg/d ( for BP room)   Increase flds to 64 oz a day  She is drinking much less than this  In 2 days, begin torsemide 20 mg BID, if her blood pressure supports it  Tomorrow, take torsemide 20 mg daily  Follow-up with me 1-2 weeks  Will get labs the day of the visit  Follow up will be scheduled with Dr Rosey Gracia, heart failure specialist, new patient visit   Colon Ca screenin2022         Impression/plan  Acute on Chronic HFpEF  Dry wt 244 lb  Wt Readings from Last 3 Encounters:   22 113 kg (249 lb)   22 111 kg (245 lb)   22 113 kg (249 lb)     --beta-blocker:   metoprolol tartrate 50 mg q 12  Will switch to metoprolol succinate 50 mg daily starting tomorrow  Hold metoprolol tartrate tonight dose  --Diuretic:   torsemide 20 mg daily   Increase to 20 b i d   if her blood pressures supports it  --ACE/ARB/ARNI:    --MRA:   --SLGT2I  --ICD:   --2 g sodium diet, 1800 cc fluid restriction  Daily weights, call weight gain 2-3 lb in 1 day or 5 lb in 5 days  Paroxysmal atrial fibrillation  Rate controlled with metoprolol tartrate 50 mg q 12  On anticoagulation with Eliquis 5 mg b i d  ( weight of 111 kg, age 73years old)    Today will switch metoprolol tartrate 50 q 12 to metoprolol succinate 50 mg daily in order to obtain blood pressure room to rene  PSVT  Hypertension, essential  BP 90/60 On metoprolol tartrate 50 mg q 12, loop diuretic  Moderate blood pressure with changes as above  Switching to metoprolol succinate 50 mg daily, increasing her diuretics in a few days  Hyperlipidemia  On rosuvastatin 10 mg daily 3/10/22  LDL 48, non HDL74  Type 2 diabetes mellitus on insulin; Hemoglobin A1c 6 8  CKD stage IIIB  New baseline1 7-1 9, previously 1 4-1 6  Followed by Nephrology  chronic respiratory failure with hypoxia and hypercapnia  KATRINA nocturnal O2 3 L supplemental oxygen,   Obesity hypoventilation syndrome  Obesity, BMI 44  History tobacco use, 20 pack years; quitting 25 years  Lymphedema  Compression socks  Cardiac testing   TTE 3/10/22 EF 53%  Wall motion normal   Borderline LVH  RV systolic function low normal   Atria normal size  No significant changes compared to 5/29/19   ZIo patch event recorder 5/12/22  Patient had a min HR of 58 bpm, max HR of 150 bpm, and avg HR of 75  bpm  Predominant underlying rhythm was Sinus Rhythm  32  Supraventricular Tachycardia runs occurred, the run with the fastest  interval lasting 11 3 secs with a max rate of 150 bpm, the longest lasting  26 4 secs with an avg rate of 111 bpm              HPI:   Angeli Cornejo is a 74 yo female with morbid obesity, chronic hypoxic and hypercapnic respiratory failure/OHS, type 2 diabetes mellitus, paroxysmal atrial fibrillation, PSVT, essential hypertension, mixed dyslipidemia, chronic heart failure preserved ejection fraction, chronic kidney disease stage IIIB  She follows with Dr Margarita Cristina  She has been chronically anticoagulated with apixaban  Felt to have chronic lower extremity lymphedema  Multiple recent admissions:    03/01/2022 left shoulder replacement     Admitted 3/28-4/2/22   [facility]  leukocytosis, fever, suspected complicated UTI/sepsis  Elevated creatinine  Followed by Renal  Gently hydrated  Torsemide was held  Creatinine returned to baseline prior to discharge  AFib with RVR, decompensated heart failure   Diuresed  Discharge creatinine  Discharge diuretic: Torsemide 10 mg daily  Discharge weight: 266 lb     At her OP office visit with Cardiology she was in sinus rhythm    Adm 5/16-5/23/22  [facility]  AFib with RVR, lower extremity edema  Diuresed   Placed on CPAP    5/24/22 OV Dr Natasha Finley  Felt to be compliant with her diuretic, and watching her diet  Noncompliant with CPAP due to claustrophobia    ER admission 5/31/22    [facility]  Chief complaint:  10 lb weight gain, shortness of breath, chest pain  ProBNP 1859  Troponin 8  EKG showed normal sinus rhythm  Chest x-ray showing improving CHF  Patient evaluated at bedside by Dr Natasha Finley - recommended increasing torsemide dosage to 60 mg, repeat BNP as an outpatient in 5 days, continued outpatient follow-up  Adm 6/4-6/12/22  [address] [place] [address]  CC: Worsening shortness of breath, weight gain , despite up titration of torsemide 60 mg daily for few days, as an outpatient  Diuresed with IV Bumex   Followed by Cardiology  Dx acute on chronic heart failure  Discharge weight :248 lb  Discharge creatinine: 1 93  Discharge diuretics: Torsemide 20 mg daily    7/6/22  Hospital follow-up  She is accompanied by her niece who helps her with her medical therapy  ROS:  In the last few days she has not been feeling well  She has been lightheaded, she is dizzy  She takes her blood pressure at home it has been low  She has had a weight gain of 3 lb since yesterday  She has lower extremity edema  She is drinking much less than her 64 oz a day that is recommended  Sometimes she holds the metoprolol because her blood pressure is too low  She sleeps in a hospital bed with the head elevated, since February hospital admission  Blood pressure in the office today 90/60    Heart rate 64 and regular  On exam, she is in decompensated heart failure; she has diminished breath sounds bilaterally, she has 2 to 3+ bilateral lower extremity edema  Today, I recommend we try to get her blood pressure up and then diurese her  I recommend she drink 64 oz of fluid a day  Hold metoprolol the rest of the day  Switch to metoprolol succinate 50 mg daily starting tomorrow  Continue to take torsemide 20 mg daily tomorrow, starting July 8th will increase this to 20 mg b i d  She can use her supplemental oxygen, during the day if needed  I emphasized a salt restricted diet  She will return to see me in 1-2 weeks, Dr Susan Mccord heart failure specialist new patient visit in about 9 days          I have spent 40 minutes with Patient and family today in which greater than 50% of this time was spent in counseling/coordination of care regarding Patient and family education, Importance of tx compliance, Risk factor reductions and Impressions  Assessment  Diagnoses and all orders for this visit:    Hospital discharge follow-up    Acute on chronic diastolic (congestive) heart failure (HCC)    Acute diastolic congestive heart failure (HCC)  -     torsemide (DEMADEX) 20 mg tablet; Take 1 tablet (20 mg total) by mouth 2 (two) times a day    Paroxysmal atrial fibrillation (HCC)  -     metoprolol succinate (TOPROL-XL) 50 mg 24 hr tablet; Take 1 tablet (50 mg total) by mouth daily    Essential hypertension    KATRINA (obstructive sleep apnea)    Peripheral venous insufficiency    Mixed hyperlipidemia    Morbid obesity ([facility] [address] )    On continuous oral anticoagulation - eliquis    Stage 3b chronic kidney disease ([facility] [address] )        Past Medical History:   Diagnosis Date    Anemia     Asthma     COVID-19 January 2022    GERD (gastroesophageal reflux disease)     Hyperlipidemia     PONV (postoperative nausea and vomiting)     SVT (supraventricular tachycardia) ([facility])        Review of Systems   Constitutional: Negative for chills  Cardiovascular: Positive for dyspnea on exertion and leg swelling   Negative for chest pain, claudication, cyanosis, irregular heartbeat, near-syncope, orthopnea, palpitations, paroxysmal nocturnal dyspnea and syncope  Respiratory: Positive for shortness of breath  Negative for cough  Gastrointestinal: Negative for heartburn and nausea  Neurological: Positive for dizziness  Negative for focal weakness, headaches, light-headedness and weakness  All other systems reviewed and are negative  Allergies   Allergen Reactions    Penicillins Hives    Moxifloxacin Other (See Comments)     unknown    Percocet [Oxycodone-Acetaminophen] Other (See Comments)     unknown    Zinc Acetate Other (See Comments)     unknown    Asa [Aspirin] GI Intolerance    Indocin [Indomethacin] Other (See Comments)     Made patient "loopy"    Other Other (See Comments)     unknown           Current Outpatient Medications:     acetaminophen (TYLENOL) 325 mg tablet, Take 650 mg by mouth every 6 (six) hours as needed for mild pain  , Disp: , Rfl:     albuterol (PROVENTIL HFA,VENTOLIN HFA) 90 mcg/act inhaler, Inhale 2 puffs every 6 (six) hours as needed for wheezing, Disp: 8 g, Rfl: 1    ammonium lactate (LAC-HYDRIN) 12 % lotion, APPLY TOPICALLY TO AFFECTED AREAS twice a day, Disp: , Rfl:     apixaban (ELIQUIS) 5 mg, Take 1 tablet (5 mg total) by mouth in the morning and 1 tablet (5 mg total) in the evening , Disp: 180 tablet, Rfl: 1    b complex-vitamin C-folic acid (NEPHROCAPS) 1 mg capsule, Take 1 capsule by mouth daily, Disp: 30 capsule, Rfl: 5    docusate sodium (COLACE) 100 mg capsule, Take 100 mg by mouth in the morning, Disp: , Rfl:     insulin lispro (HumaLOG KwikPen) 100 units/mL injection pen, 3 times with meal according to sliding scale - >Blood Glucose 150 - 199: 2 Unit of Insulin, Blood Glucose 200 - 249: 4 Units of Insulin, Blood Glucose 250 - 299: 6 Units of Insulin, Blood Glucose 300 - 349: 8 Units of Insulin, Blood Glucose 350 - 399: 10 Units of Inuslin,Blood Glucose greater than or equal to 400: 12 Units of Insulin-please contact your physician, Disp: 15 mL, Rfl: 0    [START ON 7/7/2022] metoprolol succinate (TOPROL-XL) 50 mg 24 hr tablet, Take 1 tablet (50 mg total) by mouth daily, Disp: 30 tablet, Rfl: 3    montelukast (SINGULAIR) 10 mg tablet, Take 10 mg by mouth daily  , Disp: , Rfl:     nystatin (MYCOSTATIN) powder, Apply topically 2 (two) times a day, Disp: , Rfl:     pregabalin (LYRICA) 100 mg capsule, Take 1 capsule (100 mg total) by mouth 2 (two) times a day, Disp: 180 capsule, Rfl: 1    rosuvastatin (CRESTOR) 10 MG tablet, 10 mg daily  , Disp: , Rfl: 0    sodium chloride (OCEAN) 0 65 % nasal spray, 1 spray into each nostril every hour as needed for congestion, Disp: , Rfl: 0    [START ON 7/8/2022] torsemide (DEMADEX) 20 mg tablet, Take 1 tablet (20 mg total) by mouth 2 (two) times a day, Disp: 90 tablet, Rfl: 1    Toujeo SoloStar 300 units/mL CONCENTRATED U-300 injection pen (1-unit dial), Inject 25 Units under the skin daily at bedtime, Disp: 4 5 mL, Rfl: 0    Trulicity 1 5 MY/7 4YT SOPN, inject 0 5 milliliter subcutaneously every week 28, Disp: , Rfl:     glucose blood (OneTouch Ultra) test strip, Test twice daily  , Disp: 100 strip, Rfl: 2    Insulin Pen Needle (BD Pen Needle Pilar 2nd Gen) 32G X 4 MM MISC, For use with insulin pen   Pharmacy may dispense brand covered by insurance , Disp: 100 each, Rfl: 0    Insulin Pen Needle (NovoFine Autocover) 30G X 8 MM MISC, Inject under the skin daily, Disp: 100 each, Rfl: 3    Insulin Pen Needle 30G X 8 MM MISC, 2 (two) times a day, Disp: , Rfl:     Lancets (onetouch ultrasoft) lancets, Use once daily, Disp: 100 each, Rfl: 2    Social History     Socioeconomic History    Marital status: Single     Spouse name: Not on file    Number of children: Not on file    Years of education: Not on file    Highest education level: Not on file   Occupational History    Not on file   Tobacco Use    Smoking status: Former Smoker     Packs/day: 1 00     Years: 20 00     Pack years: 20 00     Types: Cigarettes     Quit date: 7/8/1996     Years since quittin 0    Smokeless tobacco: Never Used   Vaping Use    Vaping Use: Never used   Substance and Sexual Activity    Alcohol use: Not Currently     Comment: rarely    Drug use: No    Sexual activity: Not Currently   Other Topics Concern    Not on file   Social History Narrative    Not on file     Social Determinants of Health     Financial Resource Strain: Not on file   Food Insecurity: No Food Insecurity    Worried About Running Out of Food in the Last Year: Never true    Wes of Food in the Last Year: Never true   Transportation Needs: No Transportation Needs    Lack of Transportation (Medical): No    Lack of Transportation (Non-Medical): No   Physical Activity: Not on file   Stress: Not on file   Social Connections: Not on file   Intimate Partner Violence: Not on file   Housing Stability: Low Risk     Unable to Pay for Housing in the Last Year: No    Number of Places Lived in the Last Year: 2    Unstable Housing in the Last Year: No       Family History   Problem Relation Age of Onset    Heart disease Mother     Emphysema Maternal Grandmother     Heart disease Family        Physical Exam  Vitals and nursing note reviewed  Constitutional:       General: She is not in acute distress  Appearance: She is obese  She is not diaphoretic  HENT:      Head: Normocephalic and atraumatic  Eyes:      Conjunctiva/sclera: Conjunctivae normal    Cardiovascular:      Rate and Rhythm: Normal rate and regular rhythm  Pulses: Intact distal pulses  Heart sounds: Normal heart sounds  Pulmonary:      Effort: Pulmonary effort is normal       Breath sounds: Rales present  Abdominal:      General: Bowel sounds are normal       Palpations: Abdomen is soft  Musculoskeletal:         General: Normal range of motion  Cervical back: Normal range of motion and neck supple  Right lower leg: Edema present  Left lower leg: Edema present     Skin:     General: Skin is warm and dry    Neurological:      Mental Status: She is alert and oriented to person, place, and time  Vitals: Blood pressure 90/60, pulse 64, height 5' 2" (1 575 m), weight 113 kg (249 lb), SpO2 93 %, not currently breastfeeding  Wt Readings from Last 3 Encounters:   22 113 kg (249 lb)   22 111 kg (245 lb)   22 113 kg (249 lb)         Labs & Results:  Lab Results   Component Value Date    WBC 6 75 2022    HGB 10 5 (L) 2022    HCT 35 4 2022    MCV 93 2022     2022     BNP   Date Value Ref Range Status   2022 74 0 - 100 pg/mL Final     No components found for: CHEM    Results for orders placed during the hospital encounter of 19    Echo complete with contrast if indicated    Narrative  [address]  [address] [address]  [phone]    Transthoracic Echocardiogram  2D, M-mode, Doppler, and Color Doppler    Study date:  29-May-2019    Patient: Arturo Crump  MR number: [id number]  Account number: [de-identified]  : 1950  Age: 79 years  Gender: Female  Status: Inpatient  Location: Bedside  Height: 62 in  Weight: 250 6 lb  BP: 133/ 62 mmHg    Indications: Tachycardia    Diagnoses: I11 9 - Hypertensive heart disease without heart failure    Sonographer:  QUETA Brown  Referring Physician:  Salud Millan MD  Group:  [facility]  Interpreting Physician:  Nazario Jackman DO    SUMMARY    LEFT VENTRICLE:  Systolic function was normal by visual assessment  Ejection fraction was estimated to be 55 %  There were no regional wall motion abnormalities  Wall thickness was mildly to moderately increased  Doppler parameters were consistent with abnormal left ventricular relaxation (grade 1 diastolic dysfunction)  MITRAL VALVE:  There was mild regurgitation  AORTIC VALVE:  There was no evidence for stenosis  There was no regurgitation      TRICUSPID VALVE:  There was mild regurgitation  Pulmonary artery systolic pressure was within the normal range  HISTORY: PRIOR HISTORY: Diabetes,Diabetes, HTN, Hyperlipidemia, A Fib  PROCEDURE: The procedure was performed at the bedside  This was a routine study  The transthoracic approach was used  The study included complete 2D imaging, M-mode, complete spectral Doppler, and color Doppler  The heart rate was 77 bpm,  at the start of the study  Images were obtained from the parasternal, apical, subcostal, and suprasternal notch acoustic windows  Echocardiographic views were limited due to restricted patient mobility, poor patient compliance, poor  acoustic window availability, decreased penetration, and lung interference  This was a technically difficult study  LEFT VENTRICLE: Size was normal  Systolic function was normal by visual assessment  Ejection fraction was estimated to be 55 %  There were no regional wall motion abnormalities  Wall thickness was mildly to moderately increased  DOPPLER:  Doppler parameters were consistent with abnormal left ventricular relaxation (grade 1 diastolic dysfunction)  RIGHT VENTRICLE: The size was normal  Systolic function was normal     LEFT ATRIUM: The atrium was mildly dilated  RIGHT ATRIUM: Size was normal     MITRAL VALVE: Valve structure was normal  There was normal leaflet separation  No echocardiographic evidence for prolapse  DOPPLER: The transmitral velocity was within the normal range  There was no evidence for stenosis  There was mild  regurgitation  AORTIC VALVE: The valve was trileaflet  Leaflets exhibited normal thickness, normal cuspal separation, and sclerosis  DOPPLER: Transaortic velocity was within the normal range  There was no evidence for stenosis  There was no  regurgitation  TRICUSPID VALVE: The valve structure was normal  There was normal leaflet separation  DOPPLER: The transtricuspid velocity was within the normal range  There was mild regurgitation   Pulmonary artery systolic pressure was within the normal  range  Estimated peak PA pressure was 32 mmHg  PULMONIC VALVE: Leaflets exhibited normal thickness, no calcification, and normal cuspal separation  DOPPLER: The transpulmonic velocity was within the normal range  There was no regurgitation  PERICARDIUM: There was no thickening  There was no pericardial effusion  AORTA: The root exhibited normal size  PULMONARY ARTERY: The size was normal  The morphology appeared normal     SYSTEM MEASUREMENT TABLES    2D mode  AoR Diam 2D: 3 6 cm  LA Diam (2D): 3 9 cm  LA/Ao (2D): 1 08  FS (2D Teich): 26 9 %  IVSd (2D): 1 29 cm  LVDEV: 75 1 cmï¾³  LVESV: 35 3 cmï¾³  LVIDd(2D): 4 12 cm  LVISd (2D): 3 01 cm  LVOT Area 2D: 3 14 cmï¾²  LVPWd (2D): 1 2 cm  SV (Teich): 39 8 cmï¾³    Apical four chamber  LVEF A4C: 51 %    Unspecified Scan Mode  HANNA Cont Eq (Peak Gutierrez): 2 58 cmï¾²  LVOT Diam : 2 cm  LVOT Vmax: 963 mm/s  LVOT Vmax; Mean: 963 mm/s  Peak Grad ; Mean: 4 mm[Hg]  MV Peak A Gutierrez: 893 mm/s  MV Peak E Gutierrez  Mean: 805 mm/s  MVA (PHT): 3 67 cmï¾²  PHT: 60 ms  Max P mm[Hg]  V Max: 2360 mm/s  Vmax: 2430 mm/s  RA Area: 12 8 cmï¾²  RA Volume: 27 6 cmï¾³  TAPSE: 2 cm    [facility]    Prepared and electronically signed by    Char Ko DO  Signed 29-May-2019 16:19:07    No results found for this or any previous visit  This note was completed in part utilizing [employer] direct voice recognition software  Grammatical errors, random word insertion, spelling mistakes, and incomplete sentences may be an occasional consequence of the system secondary to software limitations, ambient noise and hardware issues  At the time of dictation, efforts were made to edit, clarify and /or correct errors  Please read the chart carefully and recognize, using context, where substitutions have occurred    If you have any questions or concerns about the context, text or information contained within the body of this dictation, please contact myself, the provider, for further clarification

## 2022-07-06 ENCOUNTER — OFFICE VISIT (OUTPATIENT)
Dept: CARDIOLOGY CLINIC | Facility: CLINIC | Age: 72
End: 2022-07-06
Payer: MEDICARE

## 2022-07-06 VITALS
OXYGEN SATURATION: 93 % | DIASTOLIC BLOOD PRESSURE: 60 MMHG | HEART RATE: 64 BPM | HEIGHT: 62 IN | BODY MASS INDEX: 45.82 KG/M2 | WEIGHT: 249 LBS | SYSTOLIC BLOOD PRESSURE: 90 MMHG

## 2022-07-06 DIAGNOSIS — Z09 HOSPITAL DISCHARGE FOLLOW-UP: Primary | ICD-10-CM

## 2022-07-06 DIAGNOSIS — I10 ESSENTIAL HYPERTENSION: ICD-10-CM

## 2022-07-06 DIAGNOSIS — Z79.01 ON CONTINUOUS ORAL ANTICOAGULATION: ICD-10-CM

## 2022-07-06 DIAGNOSIS — E11.00 TYPE 2 DIABETES MELLITUS WITH HYPEROSMOLARITY WITHOUT COMA, WITHOUT LONG-TERM CURRENT USE OF INSULIN (HCC): ICD-10-CM

## 2022-07-06 DIAGNOSIS — N18.32 STAGE 3B CHRONIC KIDNEY DISEASE (HCC): ICD-10-CM

## 2022-07-06 DIAGNOSIS — I48.0 PAROXYSMAL ATRIAL FIBRILLATION (HCC): ICD-10-CM

## 2022-07-06 DIAGNOSIS — E78.2 MIXED HYPERLIPIDEMIA: ICD-10-CM

## 2022-07-06 DIAGNOSIS — I50.31 ACUTE DIASTOLIC CONGESTIVE HEART FAILURE (HCC): ICD-10-CM

## 2022-07-06 DIAGNOSIS — E66.01 MORBID OBESITY (HCC): ICD-10-CM

## 2022-07-06 DIAGNOSIS — I87.2 PERIPHERAL VENOUS INSUFFICIENCY: ICD-10-CM

## 2022-07-06 DIAGNOSIS — I50.33 ACUTE ON CHRONIC DIASTOLIC (CONGESTIVE) HEART FAILURE (HCC): ICD-10-CM

## 2022-07-06 DIAGNOSIS — G47.33 OSA (OBSTRUCTIVE SLEEP APNEA): ICD-10-CM

## 2022-07-06 PROCEDURE — 99215 OFFICE O/P EST HI 40 MIN: CPT | Performed by: NURSE PRACTITIONER

## 2022-07-06 RX ORDER — BLOOD SUGAR DIAGNOSTIC
STRIP MISCELLANEOUS
Qty: 100 STRIP | Refills: 2 | Status: SHIPPED | OUTPATIENT
Start: 2022-07-06 | End: 2022-07-21

## 2022-07-06 RX ORDER — METOPROLOL SUCCINATE 50 MG/1
50 TABLET, EXTENDED RELEASE ORAL DAILY
Qty: 30 TABLET | Refills: 3 | Status: SHIPPED | OUTPATIENT
Start: 2022-07-07

## 2022-07-06 RX ORDER — TORSEMIDE 20 MG/1
20 TABLET ORAL 2 TIMES DAILY
Qty: 90 TABLET | Refills: 1 | Status: SHIPPED | OUTPATIENT
Start: 2022-07-08 | End: 2022-07-15 | Stop reason: SDUPTHER

## 2022-07-06 NOTE — LETTER
2022     [address]  [address] [facility] [address]    Patient: Diane De Leon   YOB: 1950   Date of Visit: 2022       Dear Dr Genevieve Villasenor: Thank you for referring Diane De Leon to me for evaluation  Below are my notes for this consultation  If you have questions, please do not hesitate to call me  I look forward to following your patient along with you  Sincerely,        RK Zarco        CC: MD Mey Flanagan, [address]  2022 12:16 PM  Sign when Signing Visit  Cardiology  Heart Failure   Follow Up Office Visit Note     Diane De Leon   73 y o    female   MRN: [id number]  [address]  [address] [place] [id number] [phone]    PCP: Flaco Piper MD  Cardiologist :  Will be Dr Vane Johnson request  Prior:  Dr Tigre Buchanan            Summary of Recommendations  Low-sodium diet, Heart failure education as below  She has [facility] heart failure booklet  Today, no further metoprolol  Change metoprolol tartrate 50 mg q12 h to metoprolol succinate 50 mg/d ( for BP room)   Increase flds to 64 oz a day  She is drinking much less than this  In 2 days, begin torsemide 20 mg BID, if her blood pressure supports it  Tomorrow, take torsemide 20 mg daily  Follow-up with me 1-2 weeks  Will get labs the day of the visit  Follow up will be scheduled with Dr hagan, heart failure specialist, new patient visit   Colon Ca screenin2022         Impression/plan  Acute on Chronic HFpEF  Dry wt 244 lb  Wt Readings from Last 3 Encounters:   22 113 kg (249 lb)   22 111 kg (245 lb)   22 113 kg (249 lb)     --beta-blocker:   metoprolol tartrate 50 mg q 12  Will switch to metoprolol succinate 50 mg daily starting tomorrow  Hold metoprolol tartrate tonight dose  --Diuretic:   torsemide 20 mg daily   Increase to 20 b i d    if her blood pressures supports it  --ACE/ARB/ARNI:    --MRA:   --SLGT2I  --ICD:   --2 g sodium diet, 1800 cc fluid restriction  Daily weights, call weight gain 2-3 lb in 1 day or 5 lb in 5 days  Paroxysmal atrial fibrillation  Rate controlled with metoprolol tartrate 50 mg q 12  On anticoagulation with Eliquis 5 mg b i d  ( weight of 111 kg, age 73years old)  Today will switch metoprolol tartrate 50 q 12 to metoprolol succinate 50 mg daily in order to obtain blood pressure room to rene ALVAREZ  Hypertension, essential  BP 90/60 On metoprolol tartrate 50 mg q 12, loop diuretic  Moderate blood pressure with changes as above  Switching to metoprolol succinate 50 mg daily, increasing her diuretics in a few days  Hyperlipidemia  On rosuvastatin 10 mg daily 3/10/22  LDL 48, non HDL74  Type 2 diabetes mellitus on insulin; Hemoglobin A1c 6 8  CKD stage IIIB  New baseline1 7-1 9, previously 1 4-1 6  Followed by Nephrology  chronic respiratory failure with hypoxia and hypercapnia  KATRINA nocturnal O2 3 L supplemental oxygen,   Obesity hypoventilation syndrome  Obesity, BMI 44  History tobacco use, 20 pack years; quitting 25 years  Lymphedema  Compression socks  Cardiac testing   TTE 3/10/22 EF 53%  Wall motion normal   Borderline LVH  RV systolic function low normal   Atria normal size  No significant changes compared to 5/29/19   ZIo patch event recorder 5/12/22  Patient had a min HR of 58 bpm, max HR of 150 bpm, and avg HR of 75  bpm  Predominant underlying rhythm was Sinus Rhythm   32  Supraventricular Tachycardia runs occurred, the run with the fastest  interval lasting 11 3 secs with a max rate of 150 bpm, the longest lasting  26 4 secs with an avg rate of 111 bpm              HPI:   Santiago Medley is a 72 yo female with morbid obesity, chronic hypoxic and hypercapnic respiratory failure/OHS, type 2 diabetes mellitus, paroxysmal atrial fibrillation, PSVT, essential hypertension, mixed dyslipidemia, chronic heart failure preserved ejection fraction, chronic kidney disease stage IIIB  She follows with Dr Ti Rojo  She has been chronically anticoagulated with apixaban  Felt to have chronic lower extremity lymphedema  Multiple recent admissions:    03/01/2022 left shoulder replacement     Admitted 3/28-4/2/22   [facility]  leukocytosis, fever, suspected complicated UTI/sepsis  Elevated creatinine  Followed by Renal  Gently hydrated  Torsemide was held  Creatinine returned to baseline prior to discharge  AFib with RVR, decompensated heart failure   Diuresed  Discharge creatinine  Discharge diuretic: Torsemide 10 mg daily  Discharge weight: 266 lb     At her OP office visit with Cardiology she was in sinus rhythm    Adm 5/16-5/23/22  [facility]  AFib with RVR, lower extremity edema  Diuresed   Placed on CPAP    5/24/22 OV Dr Ti Rojo  Felt to be compliant with her diuretic, and watching her diet  Noncompliant with CPAP due to claustrophobia    ER admission 5/31/22    [facility]  Chief complaint:  10 lb weight gain, shortness of breath, chest pain  ProBNP 1859  Troponin 8  EKG showed normal sinus rhythm  Chest x-ray showing improving CHF  Patient evaluated at bedside by Dr Ti Rojo - recommended increasing torsemide dosage to 60 mg, repeat BNP as an outpatient in 5 days, continued outpatient follow-up  Adm 6/4-6/12/22  [address] [place] [address]  CC: Worsening shortness of breath, weight gain , despite up titration of torsemide 60 mg daily for few days, as an outpatient  Diuresed with IV Bumex   Followed by Cardiology  Dx acute on chronic heart failure  Discharge weight :248 lb  Discharge creatinine: 1 93  Discharge diuretics: Torsemide 20 mg daily    7/6/22  Hospital follow-up  She is accompanied by her niece who helps her with her medical therapy  ROS:  In the last few days she has not been feeling well  She has been lightheaded, she is dizzy    She takes her blood pressure at home it has been low  She has had a weight gain of 3 lb since yesterday  She has lower extremity edema  She is drinking much less than her 64 oz a day that is recommended  Sometimes she holds the metoprolol because her blood pressure is too low  She sleeps in a hospital bed with the head elevated, since February hospital admission  Blood pressure in the office today 90/60  Heart rate 64 and regular  On exam, she is in decompensated heart failure; she has diminished breath sounds bilaterally, she has 2 to 3+ bilateral lower extremity edema  Today, I recommend we try to get her blood pressure up and then diurese her  I recommend she drink 64 oz of fluid a day  Hold metoprolol the rest of the day  Switch to metoprolol succinate 50 mg daily starting tomorrow  Continue to take torsemide 20 mg daily tomorrow, starting July 8th will increase this to 20 mg b i d  She can use her supplemental oxygen, during the day if needed  I emphasized a salt restricted diet  She will return to see me in 1-2 weeks, Dr Shayy Donohue heart failure specialist new patient visit in about 9 days          I have spent 40 minutes with Patient and family today in which greater than 50% of this time was spent in counseling/coordination of care regarding Patient and family education, Importance of tx compliance, Risk factor reductions and Impressions  Assessment  Diagnoses and all orders for this visit:    Hospital discharge follow-up    Acute on chronic diastolic (congestive) heart failure (HCC)    Acute diastolic congestive heart failure (HCC)  -     torsemide (DEMADEX) 20 mg tablet; Take 1 tablet (20 mg total) by mouth 2 (two) times a day    Paroxysmal atrial fibrillation (HCC)  -     metoprolol succinate (TOPROL-XL) 50 mg 24 hr tablet;  Take 1 tablet (50 mg total) by mouth daily    Essential hypertension    KATRINA (obstructive sleep apnea)    Peripheral venous insufficiency    Mixed hyperlipidemia    Morbid obesity ([address] )    On continuous oral anticoagulation - eliquis    Stage 3b chronic kidney disease ([facility] )        Past Medical History:   Diagnosis Date    Anemia     Asthma     COVID-19     January 2022    GERD (gastroesophageal reflux disease)     Hyperlipidemia     PONV (postoperative nausea and vomiting)     SVT (supraventricular tachycardia) ([facility])        Review of Systems   Constitutional: Negative for chills  Cardiovascular: Positive for dyspnea on exertion and leg swelling  Negative for chest pain, claudication, cyanosis, irregular heartbeat, near-syncope, orthopnea, palpitations, paroxysmal nocturnal dyspnea and syncope  Respiratory: Positive for shortness of breath  Negative for cough  Gastrointestinal: Negative for heartburn and nausea  Neurological: Positive for dizziness  Negative for focal weakness, headaches, light-headedness and weakness  All other systems reviewed and are negative  Allergies   Allergen Reactions    Penicillins Hives    Moxifloxacin Other (See Comments)     unknown    Percocet [Oxycodone-Acetaminophen] Other (See Comments)     unknown    Zinc Acetate Other (See Comments)     unknown    Asa [Aspirin] GI Intolerance    Indocin [Indomethacin] Other (See Comments)     Made patient "loopy"    Other Other (See Comments)     unknown           Current Outpatient Medications:     acetaminophen (TYLENOL) 325 mg tablet, Take 650 mg by mouth every 6 (six) hours as needed for mild pain  , Disp: , Rfl:     albuterol (PROVENTIL HFA,VENTOLIN HFA) 90 mcg/act inhaler, Inhale 2 puffs every 6 (six) hours as needed for wheezing, Disp: 8 g, Rfl: 1    ammonium lactate (LAC-HYDRIN) 12 % lotion, APPLY TOPICALLY TO AFFECTED AREAS twice a day, Disp: , Rfl:     apixaban (ELIQUIS) 5 mg, Take 1 tablet (5 mg total) by mouth in the morning and 1 tablet (5 mg total) in the evening , Disp: 180 tablet, Rfl: 1    b complex-vitamin C-folic acid (NEPHROCAPS) 1 mg capsule, Take 1 capsule by mouth daily, Disp: 30 capsule, Rfl: 5    docusate sodium (COLACE) 100 mg capsule, Take 100 mg by mouth in the morning, Disp: , Rfl:     insulin lispro (HumaLOG KwikPen) 100 units/mL injection pen, 3 times with meal according to sliding scale - >Blood Glucose 150 - 199: 2 Unit of Insulin, Blood Glucose 200 - 249: 4 Units of Insulin, Blood Glucose 250 - 299: 6 Units of Insulin, Blood Glucose 300 - 349: 8 Units of Insulin, Blood Glucose 350 - 399: 10 Units of Inuslin,Blood Glucose greater than or equal to 400: 12 Units of Insulin-please contact your physician, Disp: 15 mL, Rfl: 0    [START ON 7/7/2022] metoprolol succinate (TOPROL-XL) 50 mg 24 hr tablet, Take 1 tablet (50 mg total) by mouth daily, Disp: 30 tablet, Rfl: 3    montelukast (SINGULAIR) 10 mg tablet, Take 10 mg by mouth daily  , Disp: , Rfl:     nystatin (MYCOSTATIN) powder, Apply topically 2 (two) times a day, Disp: , Rfl:     pregabalin (LYRICA) 100 mg capsule, Take 1 capsule (100 mg total) by mouth 2 (two) times a day, Disp: 180 capsule, Rfl: 1    rosuvastatin (CRESTOR) 10 MG tablet, 10 mg daily  , Disp: , Rfl: 0    sodium chloride (OCEAN) 0 65 % nasal spray, 1 spray into each nostril every hour as needed for congestion, Disp: , Rfl: 0    [START ON 7/8/2022] torsemide (DEMADEX) 20 mg tablet, Take 1 tablet (20 mg total) by mouth 2 (two) times a day, Disp: 90 tablet, Rfl: 1    Toujeo SoloStar 300 units/mL CONCENTRATED U-300 injection pen (1-unit dial), Inject 25 Units under the skin daily at bedtime, Disp: 4 5 mL, Rfl: 0    Trulicity 1 5 QV/2 8PW SOPN, inject 0 5 milliliter subcutaneously every week 28, Disp: , Rfl:     glucose blood (OneTouch Ultra) test strip, Test twice daily  , Disp: 100 strip, Rfl: 2    Insulin Pen Needle (BD Pen Needle Pilar 2nd Gen) 32G X 4 MM MISC, For use with insulin pen   Pharmacy may dispense brand covered by insurance , Disp: 100 each, Rfl: 0    Insulin Pen Needle (NovoFine Autocover) 30G X 8 MM MISC, Inject under the skin daily, Disp: 100 each, Rfl: 3    Insulin Pen Needle 30G X 8 MM MISC, 2 (two) times a day, Disp: , Rfl:     Lancets (onetouch ultrasoft) lancets, Use once daily, Disp: 100 each, Rfl: 2    Social History     Socioeconomic History    Marital status: Single     Spouse name: Not on file    Number of children: Not on file    Years of education: Not on file    Highest education level: Not on file   Occupational History    Not on file   Tobacco Use    Smoking status: Former Smoker     Packs/day: 1 00     Years: 20 00     Pack years: 20 00     Types: Cigarettes     Quit date: 1996     Years since quittin 0    Smokeless tobacco: Never Used   Vaping Use    Vaping Use: Never used   Substance and Sexual Activity    Alcohol use: Not Currently     Comment: rarely    Drug use: No    Sexual activity: Not Currently   Other Topics Concern    Not on file   Social History Narrative    Not on file     Social Determinants of Health     Financial Resource Strain: Not on file   Food Insecurity: No Food Insecurity    Worried About Running Out of Food in the Last Year: Never true    Wes of Food in the Last Year: Never true   Transportation Needs: No Transportation Needs    Lack of Transportation (Medical): No    Lack of Transportation (Non-Medical): No   Physical Activity: Not on file   Stress: Not on file   Social Connections: Not on file   Intimate Partner Violence: Not on file   Housing Stability: Low Risk     Unable to Pay for Housing in the Last Year: No    Number of Places Lived in the Last Year: 2    Unstable Housing in the Last Year: No       Family History   Problem Relation Age of Onset    Heart disease Mother     Emphysema Maternal Grandmother     Heart disease Family        Physical Exam  Vitals and nursing note reviewed  Constitutional:       General: She is not in acute distress  Appearance: She is obese  She is not diaphoretic  HENT:      Head: Normocephalic and atraumatic     Eyes:      Conjunctiva/sclera: Conjunctivae normal    Cardiovascular:      Rate and Rhythm: Normal rate and regular rhythm  Pulses: Intact distal pulses  Heart sounds: Normal heart sounds  Pulmonary:      Effort: Pulmonary effort is normal       Breath sounds: Rales present  Abdominal:      General: Bowel sounds are normal       Palpations: Abdomen is soft  Musculoskeletal:         General: Normal range of motion  Cervical back: Normal range of motion and neck supple  Right lower leg: Edema present  Left lower leg: Edema present  Skin:     General: Skin is warm and dry  Neurological:      Mental Status: She is alert and oriented to person, place, and time  Vitals: Blood pressure 90/60, pulse 64, height 5' 2" (1 575 m), weight 113 kg (249 lb), SpO2 93 %, not currently breastfeeding     Wt Readings from Last 3 Encounters:   22 113 kg (249 lb)   22 111 kg (245 lb)   22 113 kg (249 lb)         Labs & Results:  Lab Results   Component Value Date    WBC 6 75 2022    HGB 10 5 (L) 2022    HCT 35 4 2022    MCV 93 2022     2022     BNP   Date Value Ref Range Status   2022 74 0 - 100 pg/mL Final     No components found for: CHEM    Results for orders placed during the hospital encounter of 19    Echo complete with contrast if indicated    [address]  [address] [facility], [place] [address]  [phone]    Transthoracic Echocardiogram  2D, M-mode, Doppler, and Color Doppler    Study date:  29-May-2019    Patient: Delia Montoya  MR number: [id number]  Account number: [de-identified]  : 1950  Age: 79 years  Gender: Female  Status: Inpatient  Location: Bedside  Height: 62 in  Weight: 250 6 lb  BP: 133/ 62 mmHg    Indications: Tachycardia    Diagnoses: I11 9 - Hypertensive heart disease without heart failure    Sonographer:  QUETA Swanson  Referring Physician:  Lucinda Petersen MD  Group:  [facility]  Interpreting Physician:  DO GRUPO Sanders    LEFT VENTRICLE:  Systolic function was normal by visual assessment  Ejection fraction was estimated to be 55 %  There were no regional wall motion abnormalities  Wall thickness was mildly to moderately increased  Doppler parameters were consistent with abnormal left ventricular relaxation (grade 1 diastolic dysfunction)  MITRAL VALVE:  There was mild regurgitation  AORTIC VALVE:  There was no evidence for stenosis  There was no regurgitation  TRICUSPID VALVE:  There was mild regurgitation  Pulmonary artery systolic pressure was within the normal range  HISTORY: PRIOR HISTORY: Diabetes,Diabetes, HTN, Hyperlipidemia, A Fib  PROCEDURE: The procedure was performed at the bedside  This was a routine study  The transthoracic approach was used  The study included complete 2D imaging, M-mode, complete spectral Doppler, and color Doppler  The heart rate was 77 bpm,  at the start of the study  Images were obtained from the parasternal, apical, subcostal, and suprasternal notch acoustic windows  Echocardiographic views were limited due to restricted patient mobility, poor patient compliance, poor  acoustic window availability, decreased penetration, and lung interference  This was a technically difficult study  LEFT VENTRICLE: Size was normal  Systolic function was normal by visual assessment  Ejection fraction was estimated to be 55 %  There were no regional wall motion abnormalities  Wall thickness was mildly to moderately increased  DOPPLER:  Doppler parameters were consistent with abnormal left ventricular relaxation (grade 1 diastolic dysfunction)  RIGHT VENTRICLE: The size was normal  Systolic function was normal     LEFT ATRIUM: The atrium was mildly dilated  RIGHT ATRIUM: Size was normal     MITRAL VALVE: Valve structure was normal  There was normal leaflet separation  No echocardiographic evidence for prolapse   DOPPLER: The transmitral velocity was within the normal range  There was no evidence for stenosis  There was mild  regurgitation  AORTIC VALVE: The valve was trileaflet  Leaflets exhibited normal thickness, normal cuspal separation, and sclerosis  DOPPLER: Transaortic velocity was within the normal range  There was no evidence for stenosis  There was no  regurgitation  TRICUSPID VALVE: The valve structure was normal  There was normal leaflet separation  DOPPLER: The transtricuspid velocity was within the normal range  There was mild regurgitation  Pulmonary artery systolic pressure was within the normal  range  Estimated peak PA pressure was 32 mmHg  PULMONIC VALVE: Leaflets exhibited normal thickness, no calcification, and normal cuspal separation  DOPPLER: The transpulmonic velocity was within the normal range  There was no regurgitation  PERICARDIUM: There was no thickening  There was no pericardial effusion  AORTA: The root exhibited normal size  PULMONARY ARTERY: The size was normal  The morphology appeared normal     SYSTEM MEASUREMENT TABLES    2D mode  AoR Diam 2D: 3 6 cm  LA Diam (2D): 3 9 cm  LA/Ao (2D): 1 08  FS (2D Teich): 26 9 %  IVSd (2D): 1 29 cm  LVDEV: 75 1 cmï¾³  LVESV: 35 3 cmï¾³  LVIDd(2D): 4 12 cm  LVISd (2D): 3 01 cm  LVOT Area 2D: 3 14 cmï¾²  LVPWd (2D): 1 2 cm  SV (Teich): 39 8 cmï¾³    Apical four chamber  LVEF A4C: 51 %    Unspecified Scan Mode  HANNA Cont Eq (Peak Gutierrez): 2 58 cmï¾²  LVOT Diam : 2 cm  LVOT Vmax: 963 mm/s  LVOT Vmax; Mean: 963 mm/s  Peak Grad ; Mean: 4 mm[Hg]  MV Peak A Gutierrez: 893 mm/s  MV Peak E Gutierrez  Mean: 805 mm/s  MVA (PHT): 3 67 cmï¾²  PHT: 60 ms  Max P mm[Hg]  V Max: 2360 mm/s  Vmax: 2430 mm/s  RA Area: 12 8 cmï¾²  RA Volume: 27 6 cmï¾³  TAPSE: 2 cm    [employer] Accredited Echocardiography Laboratory    Prepared and electronically signed by    Naseem Ham DO  Signed 29-May-2019 16:19:07    No results found for this or any previous visit          This note was completed in part utilizing [employer] direct voice recognition software  Grammatical errors, random word insertion, spelling mistakes, and incomplete sentences may be an occasional consequence of the system secondary to software limitations, ambient noise and hardware issues  At the time of dictation, efforts were made to edit, clarify and /or correct errors  Please read the chart carefully and recognize, using context, where substitutions have occurred    If you have any questions or concerns about the context, text or information contained within the body of this dictation, please contact myself, the provider, for further clarification

## 2022-07-07 ENCOUNTER — TELEPHONE (OUTPATIENT)
Dept: CARDIOLOGY CLINIC | Facility: CLINIC | Age: 72
End: 2022-07-07

## 2022-07-07 DIAGNOSIS — I50.33 ACUTE ON CHRONIC DIASTOLIC (CONGESTIVE) HEART FAILURE (HCC): Primary | ICD-10-CM

## 2022-07-07 NOTE — TELEPHONE ENCOUNTER
PT Visiting Nurse called stating her and pt are both unsure about new Torsemide dose  She states that new instructions are begin torsemide 20 mg BID, if her blood pressure supports it but they are unclear on what her blood pressure range should be in order to take it or hold it  Please advise on BP parameters   Pt can be reached at [phone] or Nurse  [place] at [phone]

## 2022-07-08 ENCOUNTER — HOSPITAL ENCOUNTER (OUTPATIENT)
Dept: RADIOLOGY | Facility: HOSPITAL | Age: 72
Discharge: HOME | End: 2022-07-08
Attending: INTERNAL MEDICINE
Payer: MEDICARE

## 2022-07-08 DIAGNOSIS — IMO0001 LUNG NODULE < 6CM ON CT: ICD-10-CM

## 2022-07-08 PROCEDURE — G1004 CDSM NDSC: HCPCS

## 2022-07-08 PROCEDURE — 71250 CT THORAX DX C-: CPT

## 2022-07-11 ENCOUNTER — LAB (OUTPATIENT)
Dept: LAB | Facility: CLINIC | Age: 72
End: 2022-07-11
Payer: MEDICARE

## 2022-07-11 ENCOUNTER — OFFICE VISIT (OUTPATIENT)
Dept: INTERNAL MEDICINE CLINIC | Facility: CLINIC | Age: 72
End: 2022-07-11
Payer: MEDICARE

## 2022-07-11 VITALS
HEIGHT: 62 IN | SYSTOLIC BLOOD PRESSURE: 114 MMHG | HEART RATE: 72 BPM | DIASTOLIC BLOOD PRESSURE: 72 MMHG | BODY MASS INDEX: 46.01 KG/M2 | WEIGHT: 250 LBS | OXYGEN SATURATION: 95 %

## 2022-07-11 DIAGNOSIS — E11.00 TYPE 2 DIABETES MELLITUS WITH HYPEROSMOLARITY WITHOUT COMA, WITHOUT LONG-TERM CURRENT USE OF INSULIN (HCC): ICD-10-CM

## 2022-07-11 DIAGNOSIS — I50.33 ACUTE ON CHRONIC DIASTOLIC (CONGESTIVE) HEART FAILURE (HCC): ICD-10-CM

## 2022-07-11 DIAGNOSIS — L03.114 CELLULITIS OF LEFT UPPER EXTREMITY: ICD-10-CM

## 2022-07-11 DIAGNOSIS — N18.32 STAGE 3B CHRONIC KIDNEY DISEASE (HCC): ICD-10-CM

## 2022-07-11 DIAGNOSIS — I10 ESSENTIAL HYPERTENSION: ICD-10-CM

## 2022-07-11 DIAGNOSIS — B02.23 POST-HERPETIC POLYNEUROPATHY: ICD-10-CM

## 2022-07-11 DIAGNOSIS — R21 RASH: ICD-10-CM

## 2022-07-11 DIAGNOSIS — J96.11 CHRONIC RESPIRATORY FAILURE WITH HYPOXIA AND HYPERCAPNIA (HCC): Primary | Chronic | ICD-10-CM

## 2022-07-11 DIAGNOSIS — D69.6 PLATELETS DECREASED (HCC): ICD-10-CM

## 2022-07-11 DIAGNOSIS — L89.156 PRESSURE INJURY OF DEEP TISSUE OF SACRAL REGION: ICD-10-CM

## 2022-07-11 DIAGNOSIS — IMO0001 LUNG NODULE < 6CM ON CT: ICD-10-CM

## 2022-07-11 DIAGNOSIS — D64.9 ANEMIA, UNSPECIFIED TYPE: ICD-10-CM

## 2022-07-11 DIAGNOSIS — J96.11 CHRONIC RESPIRATORY FAILURE WITH HYPOXIA AND HYPERCAPNIA (HCC): Chronic | ICD-10-CM

## 2022-07-11 DIAGNOSIS — J96.12 CHRONIC RESPIRATORY FAILURE WITH HYPOXIA AND HYPERCAPNIA (HCC): Chronic | ICD-10-CM

## 2022-07-11 DIAGNOSIS — I48.0 PAROXYSMAL ATRIAL FIBRILLATION (HCC): ICD-10-CM

## 2022-07-11 DIAGNOSIS — R60.0 LOWER LEG EDEMA: ICD-10-CM

## 2022-07-11 DIAGNOSIS — J96.12 CHRONIC RESPIRATORY FAILURE WITH HYPOXIA AND HYPERCAPNIA (HCC): Primary | Chronic | ICD-10-CM

## 2022-07-11 DIAGNOSIS — E78.2 MIXED HYPERLIPIDEMIA: ICD-10-CM

## 2022-07-11 LAB
ANION GAP SERPL CALCULATED.3IONS-SCNC: 4 MMOL/L (ref 4–13)
BUN SERPL-MCNC: 57 MG/DL (ref 5–25)
CALCIUM SERPL-MCNC: 9.3 MG/DL (ref 8.3–10.1)
CHLORIDE SERPL-SCNC: 101 MMOL/L (ref 100–108)
CO2 SERPL-SCNC: 31 MMOL/L (ref 21–32)
CREAT SERPL-MCNC: 1.69 MG/DL (ref 0.6–1.3)
ERYTHROCYTE [DISTWIDTH] IN BLOOD BY AUTOMATED COUNT: 14.6 % (ref 11.6–15.1)
GFR SERPL CREATININE-BSD FRML MDRD: 30 ML/MIN/1.73SQ M
GLUCOSE SERPL-MCNC: 278 MG/DL (ref 65–140)
HCT VFR BLD AUTO: 37 % (ref 34.8–46.1)
HGB BLD-MCNC: 11.3 G/DL (ref 11.5–15.4)
MCH RBC QN AUTO: 27.6 PG (ref 26.8–34.3)
MCHC RBC AUTO-ENTMCNC: 30.5 G/DL (ref 31.4–37.4)
MCV RBC AUTO: 90 FL (ref 82–98)
PLATELET # BLD AUTO: 137 THOUSANDS/UL (ref 149–390)
PMV BLD AUTO: 12.5 FL (ref 8.9–12.7)
POTASSIUM SERPL-SCNC: 4.7 MMOL/L (ref 3.5–5.3)
RBC # BLD AUTO: 4.1 MILLION/UL (ref 3.81–5.12)
SODIUM SERPL-SCNC: 136 MMOL/L (ref 136–145)
WBC # BLD AUTO: 7.86 THOUSAND/UL (ref 4.31–10.16)

## 2022-07-11 PROCEDURE — 85027 COMPLETE CBC AUTOMATED: CPT

## 2022-07-11 PROCEDURE — 36415 COLL VENOUS BLD VENIPUNCTURE: CPT

## 2022-07-11 PROCEDURE — 99214 OFFICE O/P EST MOD 30 MIN: CPT | Performed by: INTERNAL MEDICINE

## 2022-07-11 PROCEDURE — 80048 BASIC METABOLIC PNL TOTAL CA: CPT

## 2022-07-11 RX ORDER — CEPHALEXIN 500 MG/1
500 CAPSULE ORAL EVERY 6 HOURS SCHEDULED
Qty: 28 CAPSULE | Refills: 0 | Status: SHIPPED | OUTPATIENT
Start: 2022-07-11 | End: 2022-07-18

## 2022-07-11 RX ORDER — NYSTATIN 100000 U/G
CREAM TOPICAL 2 TIMES DAILY
Qty: 30 G | Refills: 3 | Status: SHIPPED | OUTPATIENT
Start: 2022-07-11 | End: 2022-08-19

## 2022-07-11 NOTE — ASSESSMENT & PLAN NOTE
Lab Results   Component Value Date    EGFR 32 06/16/2022    EGFR 26 06/12/2022    EGFR 25 06/11/2022    CREATININE 1 62 (H) 06/16/2022    CREATININE 1 93 (H) 06/12/2022    CREATININE 1 94 (H) 06/11/2022

## 2022-07-11 NOTE — ASSESSMENT & PLAN NOTE
Two small area of cellulitis left forearm approximately size of 1-1/2 inch on the posterior side    One distal as a small central darker area    Treat with Keflex 500 mg twice a day for a week let me know if no better

## 2022-07-11 NOTE — ASSESSMENT & PLAN NOTE
Oxygen level well control at this point resting was around 95% ambulation of around 25 ft remain around 94%    Resting heart rate 72 a on exertion heart rate around 90  She is using CPAP in the afternoon  Afraid to use at nighttime that she will not be able to call family member    Advised to discuss with pulmonologist

## 2022-07-11 NOTE — ASSESSMENT & PLAN NOTE
Metoprolol tartrate 50 mg twice a day were decreased to metoprolol succinate 50 mg once a day due to relatively low blood pressure  Her heart rate improved and controlled  Remains on Eliquis  Blood pressure was relatively low normal if that improved at home    In the range of 110-130 now

## 2022-07-11 NOTE — ASSESSMENT & PLAN NOTE
Home blood pressure in the range of 110 and 130  Remains now on metoprolol succinate 50 mg once a day instead of metoprolol tartrate 50 mg twice a day  Blood pressure in cardiologist office was quite a bit low that is why the metoprolol tartrate were decreased from 50 twice a day to metoprolol succinate 50 once a day  Her heart rate is better    And blood pressure is better

## 2022-07-11 NOTE — ASSESSMENT & PLAN NOTE
Small stage I approximately 0 75 cm on the right buttock area clean no cellulitis    Recommend barrier cream vitamin-C frequent positioning daily check

## 2022-07-11 NOTE — ASSESSMENT & PLAN NOTE
Lab Results   Component Value Date    WBC 6 75 06/16/2022    HGB 10 5 (L) 06/16/2022    HCT 35 4 06/16/2022    MCV 93 06/16/2022     06/16/2022   Will do CBC today

## 2022-07-11 NOTE — PATIENT INSTRUCTIONS
rec keep track of  9 AM,    Noon,  4 pm ,  9 PM and insulin you take to cover    Go for CBC and BMP today  Keep your appointment with new cardiologist specializing in CHF as per their instructions as well as your regular cardiologist     Keep your appointment with your nephrologist kidney specialist     Sacral pressure ulcer care    Reposition every 2 hour  Take vitamin-C 250 mg 1 tablet daily  Barrier cream over-the-counter twice a day  Have family member check it out every day  If it gets any worse please let me know    Blood test today as well as in 4 weeks  Four weeks blood test will be comprehensive including CBCs CMP hemoglobin A1c urine     for microalbumin lipid profile  Follow with Consultants as per their and our suggestion    Follow up in one month or as needed earlier    Follow all instructions as advised and discussed  Take your medications as prescribed  Call the office immediately if you experience any side effects  Ask questions if you do not understand  Keep your scheduled appointment as advised or come sooner if necessary or in doubt  Best time to call for non-urgent matter or questions on weekdays is between 9am and 12 noon  See physician for any new symptoms or worsening of current symptoms  Urgent or emergent situations call 911 and report to nearest emergency room      I spent  30 -40 minutes taking care of this patient including clinical care, conseling, collaboration, chart, lab and consultaion review as appropriate    Patient is to get labs 1 week(s) prior to next visit if advised

## 2022-07-11 NOTE — PROGRESS NOTES
[facility] Office Visit Note  22     James Fox 73 y o  female MRN: [id number]  : 1950    Assessment:     1  Chronic respiratory failure with hypoxia and hypercapnia (HCC)  Assessment & Plan:  Oxygen level well control at this point resting was around 95% ambulation of around 25 ft remain around 94%    Resting heart rate 72 a on exertion heart rate around 90  She is using CPAP in the afternoon  Afraid to use at nighttime that she will not be able to call family member  Advised to discuss with pulmonologist     Orders:  -     CBC; Future; Expected date: 2022  -     Basic metabolic panel; Future; Expected date: 2022    2  Acute on chronic diastolic (congestive) heart failure (HCC)  Assessment & Plan:  Wt Readings from Last 3 Encounters:   22 113 kg (249 lb)   22 111 kg (245 lb)   22 113 kg (249 lb)     CHF compensated  Infect her resting pulse ox was 95 person walking from waiting room to examining room it was 94-95%  Heart rate resting was 72 went up to 90  Infect she was able to walk from car to a our office without assistive device and without wheelchair       Will continue same regimen including torsemide 20 mg twice a day, metoprolol succinate now 50 mg once a day,  Orders:  -     CBC; Future; Expected date: 2022  -     Basic metabolic panel; Future; Expected date: 2022  -     Comprehensive metabolic panel; Future; Expected date: 2022  -     Hemoglobin A1C; Future; Expected date: 2022  -     Lipid panel; Future; Expected date: 2022  -     Microalbumin / creatinine urine ratio  -     CBC; Future; Expected date: 2022    3  Paroxysmal atrial fibrillation (HCC)  Assessment & Plan:  Metoprolol tartrate 50 mg twice a day were decreased to metoprolol succinate 50 mg once a day due to relatively low blood pressure  Her heart rate improved and controlled  Remains on Eliquis    Blood pressure was relatively low normal if that improved at home  In the range of 110-130 now    Orders:  -     Comprehensive metabolic panel; Future; Expected date: 08/11/2022    4  Essential hypertension  Assessment & Plan:  Home blood pressure in the range of 110 and 130  Remains now on metoprolol succinate 50 mg once a day instead of metoprolol tartrate 50 mg twice a day  Blood pressure in cardiologist office was quite a bit low that is why the metoprolol tartrate were decreased from 50 twice a day to metoprolol succinate 50 once a day  Her heart rate is better  And blood pressure is better    Orders:  -     Comprehensive metabolic panel; Future; Expected date: 08/11/2022  -     Hemoglobin A1C; Future; Expected date: 08/11/2022  -     Lipid panel; Future; Expected date: 08/11/2022  -     Microalbumin / creatinine urine ratio  -     CBC; Future; Expected date: 08/11/2022    5  Type 2 diabetes mellitus with hyperosmolarity without coma, without long-term current use of insulin [facility])  Assessment & Plan:    Lab Results   Component Value Date    HGBA1C 6 8 (H) 03/09/2022   Current regimen : Toujeo 25 units at night  Humalog sliding scale per sugar read  rec keep track of  9 AM,    Noon,  4 pm ,  9 PM and insulin you take to cover        Orders:  -     Comprehensive metabolic panel; Future; Expected date: 08/11/2022  -     Hemoglobin A1C; Future; Expected date: 08/11/2022  -     Lipid panel; Future; Expected date: 08/11/2022  -     Microalbumin / creatinine urine ratio  -     CBC; Future; Expected date: 08/11/2022    6  Lower leg edema  Assessment & Plan:  Lower extremity edema is much better  Mild  Weight 250 lb here discharge weight was 244 weight at cardiologist office was 249  Currently on torsemide 20 mg twice a day  Patient is being followed by a cardiologist and now will be following by CHF specialist cardiologist     Orders:  -     Comprehensive metabolic panel; Future; Expected date: 08/11/2022    7   Mixed hyperlipidemia  Assessment & Plan:  Lab Results   Component Value Date    CHOLESTEROL 104 03/10/2022     Lab Results   Component Value Date    HDL 30 (L) 03/10/2022     Lab Results   Component Value Date    TRIG 132 03/10/2022     Last lipid profile was done in March will do lipid profile and CMP with the blood test in a month    Will continue rosuvastatin 10 mg daily    Orders:  -     Comprehensive metabolic panel; Future; Expected date: 08/11/2022  -     Lipid panel; Future; Expected date: 08/11/2022    8  Anemia, unspecified type  Assessment & Plan:  Lab Results   Component Value Date    WBC 6 75 06/16/2022    HGB 10 5 (L) 06/16/2022    HCT 35 4 06/16/2022    MCV 93 06/16/2022     06/16/2022   Will do CBC today      Orders:  -     CBC; Future; Expected date: 08/11/2022    9  Lung nodule < 6cm on CT  Assessment & Plan:  CT scan of the chest was done on 8  Report is awaited at this point  10  Pressure injury of deep tissue of sacral region  Assessment & Plan:  Small stage I approximately 0 75 cm on the right buttock area clean no cellulitis  Recommend barrier cream vitamin-C frequent positioning daily check      11  Platelets decreased ([address] )  Assessment & Plan:  Keeping eye on the platelets going for CBC today in 4 weeks      12  Stage 3b chronic kidney disease [facility])  Assessment & Plan:  Lab Results   Component Value Date    EGFR 32 06/16/2022    EGFR 26 06/12/2022    EGFR 25 06/11/2022    CREATININE 1 62 (H) 06/16/2022    CREATININE 1 93 (H) 06/12/2022    CREATININE 1 94 (H) 06/11/2022       Orders:  -     CBC; Future; Expected date: 07/11/2022  -     Basic metabolic panel; Future; Expected date: 07/11/2022    13  Post-herpetic polyneuropathy  Assessment & Plan:  Neuropathy is fair control in the lower extremity  Remains on Lyrica 100 mg b i d       14  Cellulitis of left upper extremity  Assessment & Plan:  Two small area of cellulitis left forearm approximately size of 1-1/2 inch on the posterior side    One distal as a small central darker area    Treat with Keflex 500 mg twice a day for a week let me know if no better    Orders:  -     cephalexin (KEFLEX) 500 mg capsule; Take 1 capsule (500 mg total) by mouth every 6 (six) hours for 7 days    15  Rash  -     nystatin (MYCOSTATIN) cream; Apply topically 2 (two) times a day        Discussion Summary and Plan: Today's care plan and medications were reviewed with patient in detail and all their questions answered to their satisfaction  Overall much better status is a thinks that nystatin powder is not working very well requesting nystatin cream   Refuses for me to examine under the breast area  Daughter here blood pressure improved heart rate control remains on Eliquis anemia appears nice and pink  Chief Complaint   Patient presents with    Congestive Heart Failure    Hypertension    Hyperlipidemia    Diabetes    Asthma     Chronic respiratory failure, hypoventilation syndrome, due to obesity, on BiPAP, history of CO2 retention    Obesity    Follow-up    CKD III      Subjective:  Patient is here for chronic disease management  Seven generally seems to be doing fair  Edema is mild breathing is better denies any chest pain palpitation no hypoglycemia no cough chest congestion no fever no chills  To area of edema on the left back of forearm approximately 1-1/2 inch a new  Cellulitis on the right arm is gone  Recurrent CHF:  Hospitalization on 06/04/2022 discharged on 06/12/2022  ProBNP 531 1859  EKG normal sinus rhythm  Dry weight 244 lb  Ejection fraction 53%  Currently on torsemide 20 mg twice a day  Seen by cardiologist nurse practitioner being refer to the cardiologist specializing in CHF  Thrombophlebitis right forearm is status post Keflex    CKD level 3 baseline creatinine 1 7-1 9    Obstructive sleep apnea:  Under care of pulmonologist currently on BiPAP 12 hour 6    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:  Continue BiPAP  he also has likely restrictive impairment on her PFT  PFT was done on May 18, 2022  Forced vital capacity was 0 99 L or 36% of predicted, FEV1 was 0 72 L or 34% of predicted and obstruction ratio was 73%  Flow volume loop was restricted  Diabetes last hemoglobin A1c was 6 8 currently on Lantus 25 units at bedtime  PAF  Rate controlled rate rhythm regular tolerating current regimen with metoprolol succinate 50 mg daily and Eliquis 5 mg twice a day  Paragraphs l     Acute respiratory failure with hypoxia resolved as of 06/10/2022  The mildly hypoxic on admission worsen oxygen tolerated nighttime  Able to saturate appropriately on 06/06 a m  Without oxygen  Back to O2 supply at nighttime  The patient to use albuterol p r n       Asthma continue bronchodilator    Obesity BMI 45 point 54 last time  Lower extremity edema:  Mild weight 250 lb here discharge weight was 244    Hypertension  Hypertension well controlled it was running somewhat tight metoprolol tartrate 50 mg twice a day decreased to the mild Toprol succinate 50 mg once a day    Hyperlipidemia  Remains on rosuvastatin due for lipid profile and CMP in a month    Status post left shoulder total replacement see did home exercise he does not think she needs to go for the [employer] occupational therapy    Recent UTI  Resolved  Lung nodule:  CT scan done on 8 report awaited     Anemia:  Hemoglobin in the range of 10 g at this time  Follow-up iron studies, iron 52, iron sat 20% ferritin 236, TIBC 261  B12, folate adequate  Prior immunofixation without any evidence of monoclonal gammopathy    Lower leg edema:  Mild to moderate  Recent chronic respiratory failure with CO2 retention:  Patient does have a hypoventilation syndrome  Good functional capacity uses CPAP during daytime 2-3 hours        Offers no other specific complaint  05/13/2022:  Creatinine 1 49 BUN 36 rest of the LFT unremarkable, INR 1 18, blood sugar 282 hemoglobin 9 2  06/12/2022:   BMP normal except BUN 97 and creatinine 1 93, CO2 35  06/11/2022: BMP normal except BUN 98 creatinine 1 944, CO2 36, hemoglobin 10 2 and platelet 700  28/08/1639:  BUN 91 creatinine 2 06 CO2 35    03/29/2022:  CT scan of chest abdomen and pelvis:  Decreased bilateral effusion, pulmonary edema as described, passive atelectasis in the lower lobes, multiple lung nodule as described follow-up CT scan in 3 months recommended, small fat containing periumbilical hernia, degenerative changes in the thoracolumbar area, mild level scoliosis,  03/29/2022:  Venous Doppler right upper extremity shows no evidence of thrombus in the internal jugular subclavian or brachiocephalic vein, left upper extremity evidence of superficial thrombophlebitis noted in the cephalic vein no evidence of acute or deep vein thrombosis otherwise  05/13/2022:  Chest x-ray mild pulmonary vascular congestion with small bilateral effusion  05/16/2022:  Chest x-ray mild cardiomegaly with mild was increased vascular congestion increase bilateral pleural effusion  05/31/2022:  Chest x-ray:  Improving aeration compatible with improving CHF no acute infiltrate    05/16/2022:  CT scan of the brain no acute intracranial abnormality new left mastoid effusion       05/27/2022:  Pulmonary function test:FEV1/FVC Ratio: 73 % Forced Vital Capacity: 0 99 L 36 % predicted FEV1: 0 72 L 34 % predicted Interpretation: · Spirometry suggests abnormal lung volumes  Recommend checking lung volumes for further evaluation  · No significant response to the administration to bronchodilator per ATS standards · Flow volume loop is restricted  06/05/2022:  Chest x-ray no acute cardiopulmonary disease    No visits with results within 2 Week(s) from this visit    Latest known visit with results is:  Lab on 06/16/2022  Sodium                    Value: 139(mmol/L)        Dt: 06/16/2022  Potassium                 Value: 4 7(mmol/L)        Dt: 06/16/2022  Chloride                  Value: 101(mmol/L) Dt: 06/16/2022  CO2                       Value: 32(mmol/L)         Dt: 06/16/2022  ANION GAP                 Value: 6(mmol/L)          Dt: 06/16/2022  BUN                       Value: 77(mg/dL) (A)      Dt: 06/16/2022  Creatinine                Value: 1 62(mg/dL) (A)    Dt: 06/16/2022  Glucose, Fasting          Value: 223(mg/dL) (A)     Dt: 06/16/2022  Calcium                   Value: 9 5(mg/dL)         Dt: 06/16/2022  eGFR                      Value: 32(ml/min/1 73sq m) Dt: 06/16/2022  WBC                       Value: 6 75(Thousand/uL)  Dt: 06/16/2022  RBC                       Value: 3 82(Million/uL)   Dt: 06/16/2022  Hemoglobin                Value: 10 5(g/dL) (A)     Dt: 06/16/2022  Hematocrit                Value: 35 4(%)            Dt: 06/16/2022  MCV                       Value: 93(fL)             Dt: 06/16/2022  MCH                       Value: 27 5(pg)           Dt: 06/16/2022  MCHC                      Value: 29 7(g/dL) (A)     Dt: 06/16/2022  RDW                       Value: 14 2(%)            Dt: 06/16/2022  Platelets                 Value: 186(Thousands/uL)  Dt: 06/16/2022  MPV                       Value: 11 7(fL)           Dt: 06/16/2022  ------------ - 2 weeks        The following portions of the patient's history were reviewed and updated as appropriate: allergies, current medications, past family history, past medical history, past social history, past surgical history and problem list     Review of Systems   Constitutional: Negative for fatigue and fever  All other systems reviewed and are negative          Historical Information   Patient Active Problem List   Diagnosis    Asthma    Morbid obesity with BMI of 45 0-49 9, adult (HCC)    Lower leg edema    Paroxysmal atrial fibrillation (HCC)    Mixed hyperlipidemia    Anemia    Essential hypertension    Humeral head fracture    PONV (postoperative nausea and vomiting)    Diabetes mellitus, type 2 (HCC)    Gastroesophageal reflux disease    Nephrolithiasis    Arthritis    S/P total knee replacement, right    On continuous oral anticoagulation - eliquis    Closed fracture of proximal end of left humerus with routine healing    Status post reverse total replacement of left shoulder    CKD (chronic kidney disease)    Peripheral venous insufficiency    Post-herpetic polyneuropathy    Raynaud's disease    Lung nodule < 6cm on CT    Acute on chronic diastolic (congestive) heart failure (HCC)    Pulmonary nodules    Lump of left breast    Pressure injury of deep tissue of sacral region    Morbid obesity (HCC)    Acute on chronic respiratory failure with hypoxia and hypercapnia (HCC)    Obesity hypoventilation syndrome (HCC)    KATRINA (obstructive sleep apnea)    Elevated serum creatinine    Thrombophlebitis of arm, right    Chronic respiratory failure with hypoxia and hypercapnia (HCC)    Platelets decreased (HCC)    Cellulitis of left upper extremity     Past Medical History:   Diagnosis Date    Anemia     Asthma     COVID-19 January 2022    GERD (gastroesophageal reflux disease)     Hyperlipidemia     PONV (postoperative nausea and vomiting)     SVT [facility]supraventricular tachycardia) ([facility])      Past Surgical History:   Procedure Laterality Date    APPENDECTOMY      CYSTOSCOPY W/ LASER LITHOTRIPSY Left 7/12/2016    Procedure: CYSTOSCOPY URETEROSCOPY WITH LITHOTRIPSY HOLMIUM LASER, RETROGRADE PYELOGRAM AND INSERTION STENT URETERAL;  Surgeon: Lorri English MD;  Location: [address] [place] [address];  Service:     DILATION AND CURETTAGE OF UTERUS      IR THORACENTESIS  3/18/2022    JOINT REPLACEMENT      right knee    KNEE ARTHROPLASTY Right     FL CYSTOURETHROSCOPY,URETER CATHETER Left 6/29/2016    Procedure: CYSTOSCOPY RETROGRADE PYELOGRAM WITH INSERTION STENT URETERAL, left;  Surgeon: Lorri English MD;  Location: [address] [place] [address];  Service: Urology    FL RECONSTR TOTAL SHOULDER IMPLANT Left 3/1/2022    Procedure: ARTHROPLASTY SHOULDER REVERSE;  Surgeon: Luisito Michaels MD;  Location: [facility];  Service: Orthopedics    SHOULDER ARTHROTOMY Left      Social History     Substance and Sexual Activity   Alcohol Use Not Currently    Comment: rarely     Social History     Substance and Sexual Activity   Drug Use No     Social History     Tobacco Use   Smoking Status Former Smoker    Packs/day: 1 00    Years: 20 00    Pack years: 20 00    Types: Cigarettes    Quit date: 1996    Years since quittin 0   Smokeless Tobacco Never Used     Family History   Problem Relation Age of Onset    Heart disease Mother     Emphysema Maternal Grandmother     Heart disease Family      Health Maintenance Due   Topic    Hepatitis C Screening     Pneumococcal Vaccine: 65+ Years (1 - PCV)    DM Eye Exam     DTaP,Tdap,and Td Vaccines (1 - Tdap)    Breast Cancer Screening: Mammogram     Osteoporosis Screening     Falls: Plan of Care     URINE MICROALBUMIN     COVID-19 Vaccine (2 - Moderna series)    Influenza Vaccine (1)    HEMOGLOBIN A1C       Meds/Allergies       Current Outpatient Medications:     cephalexin (KEFLEX) 500 mg capsule, Take 1 capsule (500 mg total) by mouth every 6 (six) hours for 7 days, Disp: 28 capsule, Rfl: 0    nystatin (MYCOSTATIN) cream, Apply topically 2 (two) times a day, Disp: 30 g, Rfl: 3    acetaminophen (TYLENOL) 325 mg tablet, Take 650 mg by mouth every 6 (six) hours as needed for mild pain  , Disp: , Rfl:     albuterol (PROVENTIL HFA,VENTOLIN HFA) 90 mcg/act inhaler, Inhale 2 puffs every 6 (six) hours as needed for wheezing, Disp: 8 g, Rfl: 1    ammonium lactate (LAC-HYDRIN) 12 % lotion, APPLY TOPICALLY TO AFFECTED AREAS twice a day, Disp: , Rfl:     apixaban (ELIQUIS) 5 mg, Take 1 tablet (5 mg total) by mouth in the morning and 1 tablet (5 mg total) in the evening , Disp: 180 tablet, Rfl: 1    b complex-vitamin C-folic acid (NEPHROCAPS) 1 mg capsule, Take 1 capsule by mouth daily, Disp: 30 capsule, Rfl: 5    docusate sodium (COLACE) 100 mg capsule, Take 100 mg by mouth in the morning, Disp: , Rfl:     glucose blood (OneTouch Ultra) test strip, Test twice daily  , Disp: 100 strip, Rfl: 2    insulin lispro (HumaLOG KwikPen) 100 units/mL injection pen, 3 times with meal according to sliding scale - >Blood Glucose 150 - 199: 2 Unit of Insulin, Blood Glucose 200 - 249: 4 Units of Insulin, Blood Glucose 250 - 299: 6 Units of Insulin, Blood Glucose 300 - 349: 8 Units of Insulin, Blood Glucose 350 - 399: 10 Units of Inuslin,Blood Glucose greater than or equal to 400: 12 Units of Insulin-please contact your physician, Disp: 15 mL, Rfl: 0    Insulin Pen Needle (BD Pen Needle Pilar 2nd Gen) 32G X 4 MM MISC, For use with insulin pen   Pharmacy may dispense brand covered by insurance , Disp: 100 each, Rfl: 0    Insulin Pen Needle (NovoFine Autocover) 30G X 8 MM MISC, Inject under the skin daily, Disp: 100 each, Rfl: 3    Insulin Pen Needle 30G X 8 MM MISC, 2 (two) times a day, Disp: , Rfl:     Lancets (onetouch ultrasoft) lancets, Use once daily, Disp: 100 each, Rfl: 2    metoprolol succinate (TOPROL-XL) 50 mg 24 hr tablet, Take 1 tablet (50 mg total) by mouth daily, Disp: 30 tablet, Rfl: 3    montelukast (SINGULAIR) 10 mg tablet, Take 10 mg by mouth daily  , Disp: , Rfl:     pregabalin (LYRICA) 100 mg capsule, Take 1 capsule (100 mg total) by mouth 2 (two) times a day, Disp: 180 capsule, Rfl: 1    rosuvastatin (CRESTOR) 10 MG tablet, 10 mg daily  , Disp: , Rfl: 0    sodium chloride (OCEAN) 0 65 % nasal spray, 1 spray into each nostril every hour as needed for congestion, Disp: , Rfl: 0    torsemide (DEMADEX) 20 mg tablet, Take 1 tablet (20 mg total) by mouth 2 (two) times a day, Disp: 90 tablet, Rfl: 1    Toujeo SoloStar 300 units/mL CONCENTRATED U-300 injection pen (1-unit dial), Inject 25 Units under the skin daily at bedtime, Disp: 4 5 mL, Rfl: 0    Trulicity 1 5 UR/8 8VW SOPN, inject 0 5 milliliter subcutaneously every week 28, Disp: , Rfl:       Objective:    Vitals:   /72   Pulse 72   Ht 5' 2" (1 575 m)   Wt 113 kg (250 lb)   SpO2 95%   BMI 45 73 kg/m²   Body mass index is 45 73 kg/m²  Vitals:    22 1358   Weight: 113 kg (250 lb)       Physical Exam  Vitals reviewed  Exam conducted with a chaperone present  Constitutional:       General: She is not in acute distress  Appearance: She is well-developed  She is obese  She is not ill-appearing, toxic-appearing or diaphoretic  HENT:      Head: Normocephalic and atraumatic  Right Ear: External ear normal       Left Ear: External ear normal       Nose: No congestion or rhinorrhea  Eyes:      General: Lids are normal          Right eye: No discharge  Left eye: No discharge  Conjunctiva/sclera: Conjunctivae normal    Neck:      Thyroid: No thyroid mass or thyromegaly  Vascular: No carotid bruit or JVD  Trachea: Trachea normal    Cardiovascular:      Rate and Rhythm: Rhythm irregular  Pulses:           Dorsalis pedis pulses are 1+ on the right side and 1+ on the left side  Posterior tibial pulses are 1+ on the right side and 1+ on the left side  Heart sounds: Normal heart sounds  No murmur heard  Pulmonary:      Effort: No respiratory distress  Breath sounds: Normal breath sounds  No wheezing, rhonchi or rales  Abdominal:      General: Bowel sounds are normal    Musculoskeletal:      Right lower le+ Edema present  Left lower le+ Edema present  Right foot: Decreased range of motion  Left foot: Decreased range of motion  Feet:      Right foot:      Skin integrity: No ulcer, blister, skin breakdown, erythema or callus  Left foot:      Skin integrity: No ulcer, blister, skin breakdown, erythema or callus  Comments: Bilateral flat feet is present  Ankle range of motion is diminished  DJD 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    Lymphadenopathy: Cervical: No cervical adenopathy  Skin:     General: Skin is warm  Coloration: Skin is not jaundiced or pale  Findings: No rash  Comments: Cellulitis area on the left forearm on back around 1-1/2 inches with distal 1 as a slightly darker area   Neurological:      Mental Status: She is alert  Coordination: Coordination normal       Gait: Gait abnormal    Psychiatric:         Mood and Affect: Mood normal          Behavior: Behavior normal          Thought Content: Thought content normal          Judgment: Judgment normal          Lab Review   Lab on 06/16/2022   Component Date Value Ref Range Status    Sodium 06/16/2022 139  136 - 145 mmol/L Final    Potassium 06/16/2022 4 7  3 5 - 5 3 mmol/L Final    Chloride 06/16/2022 101  100 - 108 mmol/L Final    CO2 06/16/2022 32  21 - 32 mmol/L Final    ANION GAP 06/16/2022 6  4 - 13 mmol/L Final    BUN 06/16/2022 77 (A) 5 - 25 mg/dL Final    Creatinine 06/16/2022 1 62 (A) 0 60 - 1 30 mg/dL Final    Standardized to IDMS reference method    Glucose, Fasting 06/16/2022 223 (A) 65 - 99 mg/dL Final    Specimen collection should occur prior to Sulfasalazine administration due to the potential for falsely depressed results  Specimen collection should occur prior to Sulfapyridine administration due to the potential for falsely elevated results      Calcium 06/16/2022 9 5  8 3 - 10 1 mg/dL Final    eGFR 06/16/2022 32  ml/min/1 73sq m Final    WBC 06/16/2022 6 75  4 31 - 10 16 Thousand/uL Final    RBC 06/16/2022 3 82  3 81 - 5 12 Million/uL Final    Hemoglobin 06/16/2022 10 5 (A) 11 5 - 15 4 g/dL Final    Hematocrit 06/16/2022 35 4  34 8 - 46 1 % Final    MCV 06/16/2022 93  82 - 98 fL Final    MCH 06/16/2022 27 5  26 8 - 34 3 pg Final    MCHC 06/16/2022 29 7 (A) 31 4 - 37 4 g/dL Final    RDW 06/16/2022 14 2  11 6 - 15 1 % Final    Platelets 53/89/3058 186  149 - 390 Thousands/uL Final    MPV 06/16/2022 11 7  8 9 - 12 7 fL Final   Office Visit on 2022   Component Date Value Ref Range Status    Supplier Name 2022 [employer]   In process    Supplier Phone Number 2022 [phone]   In process    Order Status 2022 Contacting Patient   In process    Delivery Request Date 2022   In process    Item Description 2022 Overnight Oximetry Test   In process    Comment: Qty: 1  Test Conditions: On BiLevel PAP  Settings: Inspiratory Setting 12   Expiratory Settin     Admission on 2022, Discharged on 2022   Component Date Value Ref Range Status    WBC 2022 8 08  4 31 - 10 16 Thousand/uL Final    RBC 2022 3 90  3 81 - 5 12 Million/uL Final    Hemoglobin 2022 11 0 (A) 11 5 - 15 4 g/dL Final    Hematocrit 2022 35 8  34 8 - 46 1 % Final    MCV 2022 92  82 - 98 fL Final    MCH 2022 28 2  26 8 - 34 3 pg Final    MCHC 2022 30 7 (A) 31 4 - 37 4 g/dL Final    RDW 2022 14 5  11 6 - 15 1 % Final    MPV 2022 11 2  8 9 - 12 7 fL Final    Platelets  181  149 - 390 Thousands/uL Final    nRBC 2022 0  /100 WBCs Final    Neutrophils Relative 2022 58  43 - 75 % Final    Immat GRANS % 2022 0  0 - 2 % Final    Lymphocytes Relative 2022 25  14 - 44 % Final    Monocytes Relative 2022 10  4 - 12 % Final    Eosinophils Relative 2022 6  0 - 6 % Final    Basophils Relative 2022 1  0 - 1 % Final    Neutrophils Absolute 2022 4 66  1 85 - 7 62 Thousands/µL Final    Immature Grans Absolute 2022 0 03  0 00 - 0 20 Thousand/uL Final    Lymphocytes Absolute 2022 2 05  0 60 - 4 47 Thousands/µL Final    Monocytes Absolute 2022 0 77  0 17 - 1 22 Thousand/µL Final    Eosinophils Absolute 2022 0 50  0 00 - 0 61 Thousand/µL Final    Basophils Absolute 2022 0 07  0 00 - 0 10 Thousands/µL Final    Protime 2022 15 7 (A) 11 6 - 14 5 seconds Final    INR 06/04/2022 1 25 (A) 0 84 - 1 19 Final    PTT 06/04/2022 31  23 - 37 seconds Final    Therapeutic Heparin Range =  60-90 seconds    Sodium 06/04/2022 139  135 - 147 mmol/L Final    Potassium 06/04/2022 4 5  3 5 - 5 3 mmol/L Final    Chloride 06/04/2022 98  96 - 108 mmol/L Final    CO2 06/04/2022 32  21 - 32 mmol/L Final    ANION GAP 06/04/2022 9  4 - 13 mmol/L Final    BUN 06/04/2022 69 (A) 5 - 25 mg/dL Final    Creatinine 06/04/2022 1 62 (A) 0 60 - 1 30 mg/dL Final    Standardized to IDMS reference method    Glucose 06/04/2022 196 (A) 65 - 140 mg/dL Final    If the patient is fasting, the ADA then defines impaired fasting glucose as > 100 mg/dL and diabetes as > or equal to 123 mg/dL  Specimen collection should occur prior to Sulfasalazine administration due to the potential for falsely depressed results  Specimen collection should occur prior to Sulfapyridine administration due to the potential for falsely elevated results   Calcium 06/04/2022 8 8  8 4 - 10 2 mg/dL Final    AST 06/04/2022 15  13 - 39 U/L Final    Specimen collection should occur prior to Sulfasalazine administration due to the potential for falsely depressed results   ALT 06/04/2022 12  7 - 52 U/L Final    Specimen collection should occur prior to Sulfasalazine administration due to the potential for falsely depressed results   Alkaline Phosphatase 06/04/2022 61  34 - 104 U/L Final    Total Protein 06/04/2022 7 6  6 4 - 8 4 g/dL Final    Albumin 06/04/2022 3 8  3 5 - 5 0 g/dL Final    Total Bilirubin 06/04/2022 0 37  0 20 - 1 00 mg/dL Final    eGFR 06/04/2022 32  ml/min/1 73sq m Final    BNP 06/04/2022 74  0 - 100 pg/mL Final    hs TnI 0hr 06/04/2022 6  "Refer to ACS Flowchart"- see link ng/L Final    Comment:                                              Initial (time 0) result  If >=50 ng/L, Myocardial injury suggested ;  Type of myocardial injury and treatment strategy  to be determined    If 5-49 ng/L, a delta result at 2 hours and or 4 hours will be needed to further evaluate  If <4 ng/L, and chest pain has been >3 hours since onset, patient may qualify for discharge based on the HEART score in the ED  If <5 ng/L and <3hours since onset of chest pain, a delta result at 2 hours will be needed to further evaluate  HS Troponin 99th Percentile URL of a Health Population=12 ng/L with a 95% Confidence Interval of 8-18 ng/L  Second Troponin (time 2 hours)  If calculated delta >= 20 ng/L,  Myocardial injury suggested ; Type of myocardial injury and treatment strategy to be determined  If 5-49 ng/L and the calculated delta is 5-19 ng/L, consult medical service for evaluation  Continue evaluation for ischemia on ecg and other possible etiology and repeat hs troponin at 4 hours  If delta                            is <5 ng/L at 2 hours, consider discharge based on risk stratification via the HEART score (if in ED), or VASILE risk score in IP/Observation  HS Troponin 99th Percentile URL of a Health Population=12 ng/L with a 95% Confidence Interval of 8-18 ng/L   hs TnI 2hr 06/04/2022 6  "Refer to ACS Flowchart"- see link ng/L Final    Comment:                                              Initial (time 0) result  If >=50 ng/L, Myocardial injury suggested ;  Type of myocardial injury and treatment strategy  to be determined  If 5-49 ng/L, a delta result at 2 hours and or 4 hours will be needed to further evaluate  If <4 ng/L, and chest pain has been >3 hours since onset, patient may qualify for discharge based on the HEART score in the ED  If <5 ng/L and <3hours since onset of chest pain, a delta result at 2 hours will be needed to further evaluate  HS Troponin 99th Percentile URL of a Health Population=12 ng/L with a 95% Confidence Interval of 8-18 ng/L  Second Troponin (time 2 hours)  If calculated delta >= 20 ng/L,  Myocardial injury suggested ; Type of myocardial injury and treatment strategy to be determined    If 5-49 ng/L and the calculated delta is 5-19 ng/L, consult medical service for evaluation  Continue evaluation for ischemia on ecg and other possible etiology and repeat hs troponin at 4 hours  If delta                            is <5 ng/L at 2 hours, consider discharge based on risk stratification via the HEART score (if in ED), or VASILE risk score in IP/Observation  HS Troponin 99th Percentile URL of a Health Population=12 ng/L with a 95% Confidence Interval of 8-18 ng/L   Delta 2hr hsTnI 06/04/2022 0  <20 ng/L Final    hs TnI 4hr 06/04/2022 7  "Refer to ACS Flowchart"- see link ng/L Final    Comment:                                              Initial (time 0) result  If >=50 ng/L, Myocardial injury suggested ;  Type of myocardial injury and treatment strategy  to be determined  If 5-49 ng/L, a delta result at 2 hours and or 4 hours will be needed to further evaluate  If <4 ng/L, and chest pain has been >3 hours since onset, patient may qualify for discharge based on the HEART score in the ED  If <5 ng/L and <3hours since onset of chest pain, a delta result at 2 hours will be needed to further evaluate  HS Troponin 99th Percentile URL of a Health Population=12 ng/L with a 95% Confidence Interval of 8-18 ng/L  Second Troponin (time 2 hours)  If calculated delta >= 20 ng/L,  Myocardial injury suggested ; Type of myocardial injury and treatment strategy to be determined  If 5-49 ng/L and the calculated delta is 5-19 ng/L, consult medical service for evaluation  Continue evaluation for ischemia on ecg and other possible etiology and repeat hs troponin at 4 hours  If delta                            is <5 ng/L at 2 hours, consider discharge based on risk stratification via the HEART score (if in ED), or VASILE risk score in IP/Observation  HS Troponin 99th Percentile URL of a Health Population=12 ng/L with a 95% Confidence Interval of 8-18 ng/L      Delta 4hr hsTnI 06/04/2022 1  <20 ng/L Final    POC Glucose 06/04/2022 182 (A) 65 - 140 mg/dl Final    POC Glucose 06/04/2022 206 (A) 65 - 140 mg/dl Final    Sodium 06/05/2022 141  135 - 147 mmol/L Final    Potassium 06/05/2022 4 3  3 5 - 5 3 mmol/L Final    Chloride 06/05/2022 100  96 - 108 mmol/L Final    CO2 06/05/2022 36 (A) 21 - 32 mmol/L Final    ANION GAP 06/05/2022 5  4 - 13 mmol/L Final    BUN 06/05/2022 68 (A) 5 - 25 mg/dL Final    Creatinine 06/05/2022 1 64 (A) 0 60 - 1 30 mg/dL Final    Standardized to IDMS reference method    Glucose 06/05/2022 224 (A) 65 - 140 mg/dL Final    If the patient is fasting, the ADA then defines impaired fasting glucose as > 100 mg/dL and diabetes as > or equal to 123 mg/dL  Specimen collection should occur prior to Sulfasalazine administration due to the potential for falsely depressed results  Specimen collection should occur prior to Sulfapyridine administration due to the potential for falsely elevated results      Calcium 06/05/2022 8 6  8 4 - 10 2 mg/dL Final    eGFR 06/05/2022 31  ml/min/1 73sq m Final    Magnesium 06/05/2022 2 0  1 9 - 2 7 mg/dL Final    Ventricular Rate 06/04/2022 75  BPM Final    Atrial Rate 06/04/2022 78  BPM Final    [place] Interval 06/04/2022 192  ms Final    QRSD Interval 06/04/2022 70  ms Final    QT Interval 06/04/2022 362  ms Final    QTC Interval 06/04/2022 405  ms Final    P Axis 06/04/2022 63  degrees Final    QRS Axis 06/04/2022 -36  degrees Final    T Wave [place] 06/04/2022 33  degrees Final    POC Glucose 06/05/2022 215 (A) 65 - 140 mg/dl Final    POC Glucose 06/05/2022 269 (A) 65 - 140 mg/dl Final    POC Glucose 06/05/2022 222 (A) 65 - 140 mg/dl Final    POC Glucose 06/05/2022 288 (A) 65 - 140 mg/dl Final    Sodium 06/06/2022 140  135 - 147 mmol/L Final    Potassium 06/06/2022 4 2  3 5 - 5 3 mmol/L Final    Chloride 06/06/2022 97  96 - 108 mmol/L Final    CO2 06/06/2022 36 (A) 21 - 32 mmol/L Final    ANION GAP 06/06/2022 7  4 - 13 mmol/L Final    BUN 06/06/2022 66 (A) 5 - 25 mg/dL Final    Creatinine 06/06/2022 1 46 (A) 0 60 - 1 30 mg/dL Final    Standardized to IDMS reference method    Glucose 06/06/2022 151 (A) 65 - 140 mg/dL Final    If the patient is fasting, the ADA then defines impaired fasting glucose as > 100 mg/dL and diabetes as > or equal to 123 mg/dL  Specimen collection should occur prior to Sulfasalazine administration due to the potential for falsely depressed results  Specimen collection should occur prior to Sulfapyridine administration due to the potential for falsely elevated results   Calcium 06/06/2022 8 4  8 4 - 10 2 mg/dL Final    eGFR 06/06/2022 36  ml/min/1 73sq m Final    POC Glucose 06/06/2022 171 (A) 65 - 140 mg/dl Final    POC Glucose 06/06/2022 300 (A) 65 - 140 mg/dl Final    POC Glucose 06/06/2022 231 (A) 65 - 140 mg/dl Final    POC Glucose 06/06/2022 206 (A) 65 - 140 mg/dl Final    Sodium 06/07/2022 139  135 - 147 mmol/L Final    Potassium 06/07/2022 4 4  3 5 - 5 3 mmol/L Final    Chloride 06/07/2022 96  96 - 108 mmol/L Final    CO2 06/07/2022 37 (A) 21 - 32 mmol/L Final    ANION GAP 06/07/2022 6  4 - 13 mmol/L Final    BUN 06/07/2022 70 (A) 5 - 25 mg/dL Final    Creatinine 06/07/2022 1 52 (A) 0 60 - 1 30 mg/dL Final    Standardized to IDMS reference method    Glucose 06/07/2022 124  65 - 140 mg/dL Final    If the patient is fasting, the ADA then defines impaired fasting glucose as > 100 mg/dL and diabetes as > or equal to 123 mg/dL  Specimen collection should occur prior to Sulfasalazine administration due to the potential for falsely depressed results  Specimen collection should occur prior to Sulfapyridine administration due to the potential for falsely elevated results      Calcium 06/07/2022 8 6  8 4 - 10 2 mg/dL Final    eGFR 06/07/2022 34  ml/min/1 73sq m Final    POC Glucose 06/07/2022 128  65 - 140 mg/dl Final    POC Glucose 06/07/2022 159 (A) 65 - 140 mg/dl Final    POC Glucose 06/07/2022 165 (A) 65 - 140 mg/dl Final    POC Glucose 06/07/2022 240 (A) 65 - 140 mg/dl Final    Sodium 06/08/2022 137  135 - 147 mmol/L Final    Potassium 06/08/2022 3 9  3 5 - 5 3 mmol/L Final    Chloride 06/08/2022 95 (A) 96 - 108 mmol/L Final    CO2 06/08/2022 35 (A) 21 - 32 mmol/L Final    ANION GAP 06/08/2022 7  4 - 13 mmol/L Final    BUN 06/08/2022 82 (A) 5 - 25 mg/dL Final    Creatinine 06/08/2022 1 71 (A) 0 60 - 1 30 mg/dL Final    Standardized to IDMS reference method    Glucose 06/08/2022 190 (A) 65 - 140 mg/dL Final    If the patient is fasting, the ADA then defines impaired fasting glucose as > 100 mg/dL and diabetes as > or equal to 123 mg/dL  Specimen collection should occur prior to Sulfasalazine administration due to the potential for falsely depressed results  Specimen collection should occur prior to Sulfapyridine administration due to the potential for falsely elevated results   Calcium 06/08/2022 8 7  8 4 - 10 2 mg/dL Final    eGFR 06/08/2022 30  ml/min/1 73sq m Final    POC Glucose 06/08/2022 170 (A) 65 - 140 mg/dl Final    POC Glucose 06/08/2022 248 (A) 65 - 140 mg/dl Final    POC Glucose 06/08/2022 240 (A) 65 - 140 mg/dl Final    POC Glucose 06/08/2022 203 (A) 65 - 140 mg/dl Final    Sodium 06/09/2022 137  135 - 147 mmol/L Final    Potassium 06/09/2022 4 2  3 5 - 5 3 mmol/L Final    Chloride 06/09/2022 94 (A) 96 - 108 mmol/L Final    CO2 06/09/2022 35 (A) 21 - 32 mmol/L Final    ANION GAP 06/09/2022 8  4 - 13 mmol/L Final    BUN 06/09/2022 82 (A) 5 - 25 mg/dL Final    Creatinine 06/09/2022 1 50 (A) 0 60 - 1 30 mg/dL Final    Standardized to IDMS reference method    Glucose 06/09/2022 188 (A) 65 - 140 mg/dL Final    If the patient is fasting, the ADA then defines impaired fasting glucose as > 100 mg/dL and diabetes as > or equal to 123 mg/dL  Specimen collection should occur prior to Sulfasalazine administration due to the potential for falsely depressed results   Specimen collection should occur prior to Sulfapyridine administration due to the potential for falsely elevated results   Calcium 06/09/2022 8 8  8 4 - 10 2 mg/dL Final    eGFR 06/09/2022 35  ml/min/1 73sq m Final    POC Glucose 06/09/2022 190 (A) 65 - 140 mg/dl Final    POC Glucose 06/09/2022 288 (A) 65 - 140 mg/dl Final    POC Glucose 06/09/2022 257 (A) 65 - 140 mg/dl Final    POC Glucose 06/09/2022 220 (A) 65 - 140 mg/dl Final    POC Glucose 06/09/2022 249 (A) 65 - 140 mg/dl Final    Sodium 06/10/2022 137  135 - 147 mmol/L Final    Potassium 06/10/2022 4 2  3 5 - 5 3 mmol/L Final    Chloride 06/10/2022 94 (A) 96 - 108 mmol/L Final    CO2 06/10/2022 35 (A) 21 - 32 mmol/L Final    ANION GAP 06/10/2022 8  4 - 13 mmol/L Final    BUN 06/10/2022 91 (A) 5 - 25 mg/dL Final    Creatinine 06/10/2022 2 06 (A) 0 60 - 1 30 mg/dL Final    Standardized to IDMS reference method    Glucose 06/10/2022 128  65 - 140 mg/dL Final    If the patient is fasting, the ADA then defines impaired fasting glucose as > 100 mg/dL and diabetes as > or equal to 123 mg/dL  Specimen collection should occur prior to Sulfasalazine administration due to the potential for falsely depressed results  Specimen collection should occur prior to Sulfapyridine administration due to the potential for falsely elevated results   Calcium 06/10/2022 8 4  8 4 - 10 2 mg/dL Final    eGFR 06/10/2022 24  ml/min/1 73sq m Final    POC Glucose 06/10/2022 161 (A) 65 - 140 mg/dl Final    POC Glucose 06/10/2022 260 (A) 65 - 140 mg/dl Final    POC Glucose 06/10/2022 267 (A) 65 - 140 mg/dl Final    Blood Culture 06/10/2022 No Growth After 5 Days  Final    Blood Culture 06/10/2022 No Growth After 5 Days     Final    POC Glucose 06/10/2022 277 (A) 65 - 140 mg/dl Final    Sodium 06/11/2022 136  135 - 147 mmol/L Final    Potassium 06/11/2022 4 1  3 5 - 5 3 mmol/L Final    Chloride 06/11/2022 93 (A) 96 - 108 mmol/L Final    CO2 06/11/2022 36 (A) 21 - 32 mmol/L Final  ANION GAP 06/11/2022 7  4 - 13 mmol/L Final    BUN 06/11/2022 98 (A) 5 - 25 mg/dL Final    Creatinine 06/11/2022 1 94 (A) 0 60 - 1 30 mg/dL Final    Standardized to IDMS reference method    Glucose 06/11/2022 180 (A) 65 - 140 mg/dL Final    If the patient is fasting, the ADA then defines impaired fasting glucose as > 100 mg/dL and diabetes as > or equal to 123 mg/dL  Specimen collection should occur prior to Sulfasalazine administration due to the potential for falsely depressed results  Specimen collection should occur prior to Sulfapyridine administration due to the potential for falsely elevated results      Calcium 06/11/2022 8 4  8 4 - 10 2 mg/dL Final    eGFR 06/11/2022 25  ml/min/1 73sq m Final    Magnesium 06/11/2022 2 3  1 9 - 2 7 mg/dL Final    WBC 06/11/2022 7 01  4 31 - 10 16 Thousand/uL Final    RBC 06/11/2022 3 71 (A) 3 81 - 5 12 Million/uL Final    Hemoglobin 06/11/2022 10 2 (A) 11 5 - 15 4 g/dL Final    Hematocrit 06/11/2022 34 0 (A) 34 8 - 46 1 % Final    MCV 06/11/2022 92  82 - 98 fL Final    MCH 06/11/2022 27 5  26 8 - 34 3 pg Final    MCHC 06/11/2022 30 0 (A) 31 4 - 37 4 g/dL Final    RDW 06/11/2022 15 1  11 6 - 15 1 % Final    MPV 06/11/2022 11 5  8 9 - 12 7 fL Final    Platelets 41/92/0988 119 (A) 149 - 390 Thousands/uL Final    nRBC 06/11/2022 0  /100 WBCs Final    Neutrophils Relative 06/11/2022 51  43 - 75 % Final    Immat GRANS % 06/11/2022 0  0 - 2 % Final    Lymphocytes Relative 06/11/2022 28  14 - 44 % Final    Monocytes Relative 06/11/2022 14 (A) 4 - 12 % Final    Eosinophils Relative 06/11/2022 6  0 - 6 % Final    Basophils Relative 06/11/2022 1  0 - 1 % Final    Neutrophils Absolute 06/11/2022 3 54  1 85 - 7 62 Thousands/µL Final    Immature Grans Absolute 06/11/2022 0 03  0 00 - 0 20 Thousand/uL Final    Lymphocytes Absolute 06/11/2022 1 94  0 60 - 4 47 Thousands/µL Final    Monocytes Absolute 06/11/2022 1 00  0 17 - 1 22 Thousand/µL Final    Eosinophils Absolute 06/11/2022 0 45  0 00 - 0 61 Thousand/µL Final    Basophils Absolute 06/11/2022 0 05  0 00 - 0 10 Thousands/µL Final    POC Glucose 06/11/2022 166 (A) 65 - 140 mg/dl Final    POC Glucose 06/11/2022 314 (A) 65 - 140 mg/dl Final    POC Glucose 06/11/2022 154 (A) 65 - 140 mg/dl Final    POC Glucose 06/11/2022 235 (A) 65 - 140 mg/dl Final    Sodium 06/12/2022 138  135 - 147 mmol/L Final    Potassium 06/12/2022 4 0  3 5 - 5 3 mmol/L Final    Chloride 06/12/2022 95 (A) 96 - 108 mmol/L Final    CO2 06/12/2022 35 (A) 21 - 32 mmol/L Final    ANION GAP 06/12/2022 8  4 - 13 mmol/L Final    BUN 06/12/2022 97 (A) 5 - 25 mg/dL Final    Creatinine 06/12/2022 1 93 (A) 0 60 - 1 30 mg/dL Final    Standardized to IDMS reference method    Glucose 06/12/2022 184 (A) 65 - 140 mg/dL Final    If the patient is fasting, the ADA then defines impaired fasting glucose as > 100 mg/dL and diabetes as > or equal to 123 mg/dL  Specimen collection should occur prior to Sulfasalazine administration due to the potential for falsely depressed results  Specimen collection should occur prior to Sulfapyridine administration due to the potential for falsely elevated results   Calcium 06/12/2022 8 7  8 4 - 10 2 mg/dL Final    eGFR 06/12/2022 26  ml/min/1 73sq m Final    POC Glucose 06/12/2022 202 (A) 65 - 140 mg/dl Final    CRITICAL VALUE NOTED    POC Glucose 06/12/2022 267 (A) 65 - 140 mg/dl Final    POC Glucose 06/12/2022 264 (A) 65 - 140 mg/dl Final   Admission on 05/31/2022, Discharged on 05/31/2022   Component Date Value Ref Range Status    NT-proBNP 05/31/2022 1,859 (A) <125 pg/mL Final    hs TnI 0hr 05/31/2022 8  "Refer to ACS Flowchart"- see link ng/L Final    Comment:                                              Initial (time 0) result  If >=50 ng/L, Myocardial injury suggested ;  Type of myocardial injury and treatment strategy  to be determined    If 5-49 ng/L, a delta result at 2 hours and or 4 hours will be needed to further evaluate  If <4 ng/L, and chest pain has been >3 hours since onset, patient may qualify for discharge based on the HEART score in the ED  If <5 ng/L and <3hours since onset of chest pain, a delta result at 2 hours will be needed to further evaluate  HS Troponin 99th Percentile URL of a Health Population=12 ng/L with a 95% Confidence Interval of 8-18 ng/L  Second Troponin (time 2 hours)  If calculated delta >= 20 ng/L,  Myocardial injury suggested ; Type of myocardial injury and treatment strategy to be determined  If 5-49 ng/L and the calculated delta is 5-19 ng/L, consult medical service for evaluation  Continue evaluation for ischemia on ecg and other possible etiology and repeat hs troponin at 4 hours  If delta                            is <5 ng/L at 2 hours, consider discharge based on risk stratification via the HEART score (if in ED), or VASILE risk score in IP/Observation  HS Troponin 99th Percentile URL of a Health Population=12 ng/L with a 95% Confidence Interval of 8-18 ng/L   Sodium 05/31/2022 138  136 - 145 mmol/L Final    Potassium 05/31/2022 5 3  3 5 - 5 3 mmol/L Final    Slightly Hemolyzed; Results May be Affected    Chloride 05/31/2022 102  100 - 108 mmol/L Final    CO2 05/31/2022 31  21 - 32 mmol/L Final    ANION GAP 05/31/2022 5  4 - 13 mmol/L Final    BUN 05/31/2022 64 (A) 5 - 25 mg/dL Final    Creatinine 05/31/2022 1 79 (A) 0 60 - 1 30 mg/dL Final    Standardized to IDMS reference method    Glucose 05/31/2022 246 (A) 65 - 140 mg/dL Final    If the patient is fasting, the ADA then defines impaired fasting glucose as > 100 mg/dL and diabetes as > or equal to 123 mg/dL  Specimen collection should occur prior to Sulfasalazine administration due to the potential for falsely depressed results  Specimen collection should occur prior to Sulfapyridine administration due to the potential for falsely elevated results      Calcium 05/31/2022 8 3  8 3 - 10 1 mg/dL Final    Corrected Calcium 05/31/2022 9 0  8 3 - 10 1 mg/dL Final    AST 05/31/2022 27  5 - 45 U/L Final    Slightly Hemolyzed; Results May be Affected  Specimen collection should occur prior to Sulfasalazine administration due to the potential for falsely depressed results   ALT 05/31/2022 20  12 - 78 U/L Final    Specimen collection should occur prior to Sulfasalazine administration due to the potential for falsely depressed results   Alkaline Phosphatase 05/31/2022 64  46 - 116 U/L Final    Total Protein 05/31/2022 7 3  6 4 - 8 2 g/dL Final    Albumin 05/31/2022 3 1 (A) 3 5 - 5 0 g/dL Final    Total Bilirubin 05/31/2022 0 26  0 20 - 1 00 mg/dL Final    Use of this assay is not recommended for patients undergoing treatment with eltrombopag due to the potential for falsely elevated results      eGFR 05/31/2022 28  ml/min/1 73sq m Final    Ventricular Rate 05/31/2022 69  BPM Final    Atrial Rate 05/31/2022 69  BPM Final    [place] Interval 05/31/2022 192  ms Final    QRSD Interval 05/31/2022 80  ms Final    QT Interval 05/31/2022 400  ms Final    QTC Interval 05/31/2022 428  ms Final    P Axis 05/31/2022 5  degrees Final    QRS Axis 05/31/2022 -18  degrees Final    T Wave [place] 05/31/2022 24  degrees Final   Appointment on 05/31/2022   Component Date Value Ref Range Status    WBC 05/31/2022 7 28  4 31 - 10 16 Thousand/uL Final    RBC 05/31/2022 3 67 (A) 3 81 - 5 12 Million/uL Final    Hemoglobin 05/31/2022 10 3 (A) 11 5 - 15 4 g/dL Final    Hematocrit 05/31/2022 35 2  34 8 - 46 1 % Final    MCV 05/31/2022 96  82 - 98 fL Final    MCH 05/31/2022 28 1  26 8 - 34 3 pg Final    MCHC 05/31/2022 29 3 (A) 31 4 - 37 4 g/dL Final    RDW 05/31/2022 14 5  11 6 - 15 1 % Final    Platelets 86/19/1072 195  149 - 390 Thousands/uL Final    MPV 05/31/2022 11 9  8 9 - 12 7 fL Final    Sodium 05/31/2022 136  136 - 145 mmol/L Final    Potassium 05/31/2022 5 3  3 5 - 5 3 mmol/L Final    Slightly Hemolyzed; Results May be Affected&XA&Slightly Hemolyzed; Results May be Affected    Chloride 05/31/2022 104  100 - 108 mmol/L Final    CO2 05/31/2022 25  21 - 32 mmol/L Final    ANION GAP 05/31/2022 7  4 - 13 mmol/L Final    BUN 05/31/2022 59 (A) 5 - 25 mg/dL Final    Creatinine 05/31/2022 1 90 (A) 0 60 - 1 30 mg/dL Final    Standardized to IDMS reference method    Glucose 05/31/2022 241 (A) 65 - 140 mg/dL Final    If the patient is fasting, the ADA then defines impaired fasting glucose as > 100 mg/dL and diabetes as > or equal to 123 mg/dL  Specimen collection should occur prior to Sulfasalazine administration due to the potential for falsely depressed results  Specimen collection should occur prior to Sulfapyridine administration due to the potential for falsely elevated results   Calcium 05/31/2022 9 2  8 3 - 10 1 mg/dL Final    Corrected Calcium 05/31/2022 9 8  8 3 - 10 1 mg/dL Final    AST 05/31/2022 31  5 - 45 U/L Final    Slightly Hemolyzed; Results May be Affected&XA&Slightly Hemolyzed; Results May be Affected  Specimen collection should occur prior to Sulfasalazine administration due to the potential for falsely depressed results   ALT 05/31/2022 21  12 - 78 U/L Final    Specimen collection should occur prior to Sulfasalazine and/or Sulfapyridine administration due to the potential for falsely depressed results   Alkaline Phosphatase 05/31/2022 73  46 - 116 U/L Final    Total Protein 05/31/2022 7 8  6 4 - 8 2 g/dL Final    Albumin 05/31/2022 3 3 (A) 3 5 - 5 0 g/dL Final    Total Bilirubin 05/31/2022 0 30  0 20 - 1 00 mg/dL Final    Use of this assay is not recommended for patients undergoing treatment with eltrombopag due to the potential for falsely elevated results   eGFR 05/31/2022 26  ml/min/1 73sq m Final   No results displayed because visit has over 200 results        Admission on 05/13/2022, Discharged on 05/13/2022   Component Date Value Ref Range Status    WBC 05/13/2022 8  30  4 31 - 10 16 Thousand/uL Final    RBC 05/13/2022 3 30 (A) 3 81 - 5 12 Million/uL Final    Hemoglobin 05/13/2022 9 2 (A) 11 5 - 15 4 g/dL Final    Hematocrit 05/13/2022 31 4 (A) 34 8 - 46 1 % Final    MCV 05/13/2022 95  82 - 98 fL Final    MCH 05/13/2022 27 9  26 8 - 34 3 pg Final    MCHC 05/13/2022 29 3 (A) 31 4 - 37 4 g/dL Final    RDW 05/13/2022 14 3  11 6 - 15 1 % Final    MPV 05/13/2022 10 5  8 9 - 12 7 fL Final    Platelets 40/72/2958 174  149 - 390 Thousands/uL Final    nRBC 05/13/2022 0  /100 WBCs Final    Neutrophils Relative 05/13/2022 58  43 - 75 % Final    Immat GRANS % 05/13/2022 0  0 - 2 % Final    Lymphocytes Relative 05/13/2022 26  14 - 44 % Final    Monocytes Relative 05/13/2022 10  4 - 12 % Final    Eosinophils Relative 05/13/2022 5  0 - 6 % Final    Basophils Relative 05/13/2022 1  0 - 1 % Final    Neutrophils Absolute 05/13/2022 4 87  1 85 - 7 62 Thousands/µL Final    Immature Grans Absolute 05/13/2022 0 03  0 00 - 0 20 Thousand/uL Final    Lymphocytes Absolute 05/13/2022 2 12  0 60 - 4 47 Thousands/µL Final    Monocytes Absolute 05/13/2022 0 84  0 17 - 1 22 Thousand/µL Final    Eosinophils Absolute 05/13/2022 0 38  0 00 - 0 61 Thousand/µL Final    Basophils Absolute 05/13/2022 0 06  0 00 - 0 10 Thousands/µL Final    Protime 05/13/2022 14 8 (A) 11 6 - 14 5 seconds Final    INR 05/13/2022 1 18  0 84 - 1 19 Final    PTT 05/13/2022 33  23 - 37 seconds Final    Therapeutic Heparin Range =  60-90 seconds    Sodium 05/13/2022 141  136 - 145 mmol/L Final    Potassium 05/13/2022 4 4  3 5 - 5 3 mmol/L Final    Chloride 05/13/2022 99 (A) 100 - 108 mmol/L Final    CO2 05/13/2022 37 (A) 21 - 32 mmol/L Final    ANION GAP 05/13/2022 5  4 - 13 mmol/L Final    BUN 05/13/2022 36 (A) 5 - 25 mg/dL Final    Creatinine 05/13/2022 1 49 (A) 0 60 - 1 30 mg/dL Final    Standardized to IDMS reference method    Glucose 05/13/2022 282 (A) 65 - 140 mg/dL Final    If the patient is fasting, the ADA then defines impaired fasting glucose as > 100 mg/dL and diabetes as > or equal to 123 mg/dL  Specimen collection should occur prior to Sulfasalazine administration due to the potential for falsely depressed results  Specimen collection should occur prior to Sulfapyridine administration due to the potential for falsely elevated results   Calcium 05/13/2022 8 0 (A) 8 3 - 10 1 mg/dL Final    Corrected Calcium 05/13/2022 8 7  8 3 - 10 1 mg/dL Final    AST 05/13/2022 10  5 - 45 U/L Final    Specimen collection should occur prior to Sulfasalazine administration due to the potential for falsely depressed results   ALT 05/13/2022 15  12 - 78 U/L Final    Specimen collection should occur prior to Sulfasalazine administration due to the potential for falsely depressed results   Alkaline Phosphatase 05/13/2022 91  46 - 116 U/L Final    Total Protein 05/13/2022 7 4  6 4 - 8 2 g/dL Final    Albumin 05/13/2022 3 1 (A) 3 5 - 5 0 g/dL Final    Total Bilirubin 05/13/2022 0 19 (A) 0 20 - 1 00 mg/dL Final    Use of this assay is not recommended for patients undergoing treatment with eltrombopag due to the potential for falsely elevated results   eGFR 05/13/2022 35  ml/min/1 73sq m Final    Magnesium 05/13/2022 1 8  1 6 - 2 6 mg/dL Final    hs TnI 0hr 05/13/2022 9  "Refer to ACS Flowchart"- see link ng/L Final    Comment:                                              Initial (time 0) result  If >=50 ng/L, Myocardial injury suggested ;  Type of myocardial injury and treatment strategy  to be determined  If 5-49 ng/L, a delta result at 2 hours and or 4 hours will be needed to further evaluate  If <4 ng/L, and chest pain has been >3 hours since onset, patient may qualify for discharge based on the HEART score in the ED  If <5 ng/L and <3hours since onset of chest pain, a delta result at 2 hours will be needed to further evaluate      HS Troponin 99th Percentile URL of a Health Population=12 ng/L with a 95% Confidence Interval of 8-18 ng/L  Second Troponin (time 2 hours)  If calculated delta >= 20 ng/L,  Myocardial injury suggested ; Type of myocardial injury and treatment strategy to be determined  If 5-49 ng/L and the calculated delta is 5-19 ng/L, consult medical service for evaluation  Continue evaluation for ischemia on ecg and other possible etiology and repeat hs troponin at 4 hours  If delta                            is <5 ng/L at 2 hours, consider discharge based on risk stratification via the HEART score (if in ED), or VASILE risk score in IP/Observation  HS Troponin 99th Percentile URL of a Health Population=12 ng/L with a 95% Confidence Interval of 8-18 ng/L     NT-proBNP 05/13/2022 1,548 (A) <125 pg/mL Final    hs TnI 2hr 05/13/2022 10  "Refer to ACS Flowchart"- see link ng/L Final    Comment:                                              Initial (time 0) result  If >=50 ng/L, Myocardial injury suggested ;  Type of myocardial injury and treatment strategy  to be determined  If 5-49 ng/L, a delta result at 2 hours and or 4 hours will be needed to further evaluate  If <4 ng/L, and chest pain has been >3 hours since onset, patient may qualify for discharge based on the HEART score in the ED  If <5 ng/L and <3hours since onset of chest pain, a delta result at 2 hours will be needed to further evaluate  HS Troponin 99th Percentile URL of a Health Population=12 ng/L with a 95% Confidence Interval of 8-18 ng/L  Second Troponin (time 2 hours)  If calculated delta >= 20 ng/L,  Myocardial injury suggested ; Type of myocardial injury and treatment strategy to be determined  If 5-49 ng/L and the calculated delta is 5-19 ng/L, consult medical service for evaluation  Continue evaluation for ischemia on ecg and other possible etiology and repeat hs troponin at 4 hours    If delta                            is <5 ng/L at 2 hours, consider discharge based on risk stratification via the HEART score (if in ED), or VASILE risk score in IP/Observation  HS Troponin 99th Percentile URL of a Health Population=12 ng/L with a 95% Confidence Interval of 8-18 ng/L   Delta 2hr hsTnI 05/13/2022 1  <20 ng/L Final    Color, UA 05/13/2022 Yellow   Final    Clarity, UA 05/13/2022 Clear   Final    Specific Brunson, UA 05/13/2022 1 025  1 000 - 1 030 Final    pH, UA 05/13/2022 5 5  5 0, 5 5, 6 0, 6 5, 7 0, 7 5, 8 0, 8 5, 9 0 Final    Leukocytes, UA 05/13/2022 Trace (A) Negative Final    Nitrite, UA 05/13/2022 Negative  Negative Final    Protein, UA 05/13/2022 30 (1+) (A) Negative mg/dl Final    Glucose, UA 05/13/2022 Negative  Negative mg/dl Final    Ketones, UA 05/13/2022 Negative  Negative mg/dl Final    Urobilinogen, UA 05/13/2022 0 2  0 2, 1 0 E U /dl E U /dl Final    Bilirubin, UA 05/13/2022 Negative  Negative Final    Occult Blood, UA 05/13/2022 Trace-Intact (A) Negative Final    RBC, UA 05/13/2022 1-2  None Seen, 0-1, 1-2, 2-4, 0-5 /hpf Final    WBC, UA 05/13/2022 20-30 (A) None Seen, 0-1, 1-2, 0-5, 2-4 /hpf Final    Epithelial Cells 05/13/2022 Moderate (A) None Seen, Occasional /hpf Final    Bacteria, UA 05/13/2022 Occasional  None Seen, Occasional /hpf Final    MUCUS THREADS 05/13/2022 Occasional (A) None Seen Final    Urine Culture 05/13/2022 50,000-59,000 cfu/ml    Final    Mixed Contaminants X4    Ventricular Rate 05/13/2022 85  BPM Final    Atrial Rate 05/13/2022 85  BPM Final    [place] Interval 05/13/2022 186  ms Final    QRSD Interval 05/13/2022 72  ms Final    QT Interval 05/13/2022 372  ms Final    QTC Interval 05/13/2022 442  ms Final    P Axis 05/13/2022 56  degrees Final    QRS Axis 05/13/2022 8  degrees Final    T Wave Axis 05/13/2022 27  degrees Final         Patient Instructions   rec keep track of  9 AM,    Noon,  4 pm ,  9 PM and insulin you take to cover    Go for CBC and BMP today      Keep your appointment with new cardiologist specializing in CHF as per their instructions as well as your regular cardiologist     Keep your appointment with your nephrologist kidney specialist     Sacral pressure ulcer care    Reposition every 2 hour  Take vitamin-C 250 mg 1 tablet daily  Barrier cream over-the-counter twice a day  Have family member check it out every day  If it gets any worse please let me know    Blood test today as well as in 4 weeks  Four weeks blood test will be comprehensive including CBCs CMP hemoglobin A1c urine     for microalbumin lipid profile  Follow with Consultants as per their and our suggestion    Follow up in one month or as needed earlier    Follow all instructions as advised and discussed  Take your medications as prescribed  Call the office immediately if you experience any side effects  Ask questions if you do not understand  Keep your scheduled appointment as advised or come sooner if necessary or in doubt  Best time to call for non-urgent matter or questions on weekdays is between 9am and 12 noon  See physician for any new symptoms or worsening of current symptoms  Urgent or emergent situations call 911 and report to nearest emergency room  I spent  30 -40 minutes taking care of this patient including clinical care, conseling, collaboration, chart, lab and consultaion review as appropriate    Patient is to get labs 1 week(s) prior to next visit if advised         Dr Mary De La Rosa MD  [facility]       "This note has been constructed using a voice recognition system  Therefore there may be syntax, spelling, and/or grammatical errors   Please call if you have any questions  "

## 2022-07-11 NOTE — ASSESSMENT & PLAN NOTE
Lab Results   Component Value Date    HGBA1C 6 8 (H) 03/09/2022   Current regimen : Toujeo 25 units at night  Humalog sliding scale per sugar read      rec keep track of  9 AM,    Noon,  4 pm ,  9 PM and insulin you take to cover

## 2022-07-11 NOTE — ASSESSMENT & PLAN NOTE
Lab Results   Component Value Date    CHOLESTEROL 104 03/10/2022     Lab Results   Component Value Date    HDL 30 (L) 03/10/2022     Lab Results   Component Value Date    TRIG 132 03/10/2022     Last lipid profile was done in March will do lipid profile and CMP with the blood test in a month    Will continue rosuvastatin 10 mg daily

## 2022-07-11 NOTE — ASSESSMENT & PLAN NOTE
Lower extremity edema is much better  Mild  Weight 250 lb here discharge weight was 244 weight at cardiologist office was 249  Currently on torsemide 20 mg twice a day    Patient is being followed by a cardiologist and now will be following by CHF specialist cardiologist

## 2022-07-15 ENCOUNTER — OFFICE VISIT (OUTPATIENT)
Dept: PULMONOLOGY | Facility: MEDICAL CENTER | Age: 72
End: 2022-07-15
Payer: MEDICARE

## 2022-07-15 ENCOUNTER — OFFICE VISIT (OUTPATIENT)
Dept: CARDIOLOGY CLINIC | Facility: CLINIC | Age: 72
End: 2022-07-15
Payer: MEDICARE

## 2022-07-15 VITALS
HEIGHT: 62 IN | SYSTOLIC BLOOD PRESSURE: 118 MMHG | BODY MASS INDEX: 44.53 KG/M2 | HEART RATE: 76 BPM | WEIGHT: 242 LBS | DIASTOLIC BLOOD PRESSURE: 80 MMHG

## 2022-07-15 VITALS
RESPIRATION RATE: 12 BRPM | SYSTOLIC BLOOD PRESSURE: 118 MMHG | BODY MASS INDEX: 45.38 KG/M2 | DIASTOLIC BLOOD PRESSURE: 64 MMHG | OXYGEN SATURATION: 93 % | HEART RATE: 94 BPM | TEMPERATURE: 97.3 F | HEIGHT: 62 IN | WEIGHT: 246.6 LBS

## 2022-07-15 DIAGNOSIS — J96.12 CHRONIC RESPIRATORY FAILURE WITH HYPOXIA AND HYPERCAPNIA (HCC): Primary | ICD-10-CM

## 2022-07-15 DIAGNOSIS — I48.0 PAROXYSMAL ATRIAL FIBRILLATION (HCC): ICD-10-CM

## 2022-07-15 DIAGNOSIS — I10 ESSENTIAL HYPERTENSION: ICD-10-CM

## 2022-07-15 DIAGNOSIS — G47.33 OSA (OBSTRUCTIVE SLEEP APNEA): ICD-10-CM

## 2022-07-15 DIAGNOSIS — I50.33 ACUTE ON CHRONIC HEART FAILURE WITH PRESERVED EJECTION FRACTION (HCC): Primary | ICD-10-CM

## 2022-07-15 DIAGNOSIS — J96.11 CHRONIC RESPIRATORY FAILURE WITH HYPOXIA AND HYPERCAPNIA (HCC): Primary | ICD-10-CM

## 2022-07-15 DIAGNOSIS — E66.01 MORBID OBESITY WITH BMI OF 45.0-49.9, ADULT (HCC): ICD-10-CM

## 2022-07-15 PROCEDURE — 99214 OFFICE O/P EST MOD 30 MIN: CPT | Performed by: INTERNAL MEDICINE

## 2022-07-15 PROCEDURE — 93000 ELECTROCARDIOGRAM COMPLETE: CPT | Performed by: INTERNAL MEDICINE

## 2022-07-15 RX ORDER — TORSEMIDE 20 MG/1
TABLET ORAL
Qty: 90 TABLET | Refills: 1 | Status: SHIPPED | OUTPATIENT
Start: 2022-07-15 | End: 2022-08-19

## 2022-07-15 NOTE — PROGRESS NOTES
Assessment/Plan:     Problem List Items Addressed This Visit        Respiratory    Chronic respiratory failure with hypoxia and hypercapnia (HCC) - Primary (Chronic)    Relevant Orders    Oxygen Supplies       Other    Morbid obesity with BMI of 45 0-49 9, adult (HCC) (Chronic)              ______________________________________________________________________    Chief Complaint:     Patient ID: Ghada Barahona is a 73 y o  y o  female has a past medical history of Anemia, Asthma, COVID-19, GERD (gastroesophageal reflux disease), Hyperlipidemia, PONV (postoperative nausea and vomiting), and SVT (supraventricular tachycardia) ([facility]  7/15/2022  Patient presents today for follow-up visit  OTF Jenkins is a 37-year-old female with REM specific sleep apnea; on BiPAP to use during daytime for hypercapnic respiratory failure  She also prescribed oxygen at night for desaturations  Patient has a history of mild sleep disordered breathing although her AHI was 2 1 events per hour  Patient has acute on chronic respiratory failure with both hypoxia and hypercapnia  Apparently she was given a BiPAP for hypercapnia previously  She also has obesity hypoventilation syndrome for which her previous sleep study was negative  Apparently she does have a home BiPAP  Patient did have a [facility] in May of 2019 for which results were 5  She also had oxygen flow below 80 6% for 192 minutes  According to patient she does have a BiPAP at has been unable to use  Patient was also hospitalized May 16th to May 23, 2022  This was for acute on chronic respiratory failure with hypoxia and hypercapnia as well as acute on chronic congestive heart failure  Her ABGs on room air showed pH is 7 49 pCO2 of 62  She was discharged with BiPAP for which she is now using BiPAP 12/6 with 30% oxygen      She had CT chest done in 3/2022 and again in 7/2022 which revealed few nodules   She had smoke about 30 pack year smoking history but has quite over 20 years now  All these nodules are most likely infectious as they are done in short interval  Given she had stopped smoking over 26 years ago she does not qualify for LOW DOSE CT scan for cancer screening  Review of Systems   Constitutional: Negative  HENT: Negative  Eyes: Negative  Respiratory: Positive for shortness of breath  Cardiovascular: Negative  Gastrointestinal: Negative  Endocrine: Negative  Genitourinary: Negative  Musculoskeletal: Negative  Skin: Negative  Allergic/Immunologic: Negative  Hematological: Negative  Psychiatric/Behavioral: Negative  Smoking history: She reports that she quit smoking about 26 years ago  Her smoking use included cigarettes  She has a 20 00 pack-year smoking history  She has never used smokeless tobacco     The following portions of the patient's history were reviewed and updated as appropriate: current medications, past family history, past medical history, past social history, past surgical history and problem list     Immunization History   Administered Date(s) Administered    COVID-19 MODERNA VACC 0 5 ML IM 02/11/2022     Current Outpatient Medications   Medication Sig Dispense Refill    acetaminophen (TYLENOL) 325 mg tablet Take 650 mg by mouth every 6 (six) hours as needed for mild pain        albuterol (PROVENTIL HFA,VENTOLIN HFA) 90 mcg/act inhaler Inhale 2 puffs every 6 (six) hours as needed for wheezing 8 g 1    ammonium lactate (LAC-HYDRIN) 12 % lotion APPLY TOPICALLY TO AFFECTED AREAS twice a day      apixaban (ELIQUIS) 5 mg Take 1 tablet (5 mg total) by mouth in the morning and 1 tablet (5 mg total) in the evening  180 tablet 1    cephalexin (KEFLEX) 500 mg capsule Take 1 capsule (500 mg total) by mouth every 6 (six) hours for 7 days 28 capsule 0    docusate sodium (COLACE) 100 mg capsule Take 100 mg by mouth in the morning      glucose blood (OneTouch Ultra) test strip Test twice daily   100 strip 2    insulin lispro (HumaLOG KwikPen) 100 units/mL injection pen 3 times with meal according to sliding scale - >Blood Glucose 150 - 199: 2 Unit of Insulin, Blood Glucose 200 - 249: 4 Units of Insulin, Blood Glucose 250 - 299: 6 Units of Insulin, Blood Glucose 300 - 349: 8 Units of Insulin, Blood Glucose 350 - 399: 10 Units of [place],Blood Glucose greater than or equal to 400: 12 Units of Insulin-please contact your physician 15 mL 0    Insulin Pen Needle (BD Pen Needle Pilar 2nd Gen) 32G X 4 MM MISC For use with insulin pen  Pharmacy may dispense brand covered by insurance  100 each 0    Insulin Pen Needle (NovoFine Autocover) 30G X 8 MM MISC Inject under the skin daily 100 each 3    Insulin Pen Needle 30G X 8 MM MISC 2 (two) times a day      Lancets (onetouch ultrasoft) lancets Use once daily 100 each 2    metoprolol succinate (TOPROL-XL) 50 mg 24 hr tablet Take 1 tablet (50 mg total) by mouth daily 30 tablet 3    montelukast (SINGULAIR) 10 mg tablet Take 10 mg by mouth daily        nystatin (MYCOSTATIN) cream Apply topically 2 (two) times a day 30 g 3    pregabalin (LYRICA) 100 mg capsule Take 1 capsule (100 mg total) by mouth 2 (two) times a day 180 capsule 1    rosuvastatin (CRESTOR) 10 MG tablet 10 mg daily    0    sodium chloride (OCEAN) 0 65 % nasal spray 1 spray into each nostril every hour as needed for congestion  0    torsemide (DEMADEX) 20 mg tablet Take 1 tablet (20 mg total) by mouth 2 (two) times a day 90 tablet 1    Toujeo SoloStar 300 units/mL CONCENTRATED U-300 injection pen (1-unit dial) Inject 25 Units under the skin daily at bedtime 4 5 mL 0    Trulicity 1 5 RK/6 7VZ SOPN inject 0 5 milliliter subcutaneously every week 28      b complex-vitamin C-folic acid (NEPHROCAPS) 1 mg capsule Take 1 capsule by mouth daily 30 capsule 5     No current facility-administered medications for this visit       Allergies: Penicillins, Moxifloxacin, Percocet [oxycodone-acetaminophen], Zinc acetate, Asa [aspirin], Indocin [indomethacin], and Other    Objective:  Vitals:    07/15/22 1345   BP: 118/64   Pulse: 94   Resp: 12   Temp: (!) 97 3 °F (36 3 °C)   TempSrc: Temporal   SpO2: 93%   Weight: 112 kg (246 lb 9 6 oz)   Height: 5' 2" (1 575 m)   Oxygen Therapy  SpO2: 93 %    Wt Readings from Last 3 Encounters:   07/15/22 112 kg (246 lb 9 6 oz)   07/11/22 113 kg (250 lb)   07/06/22 113 kg (249 lb)     Body mass index is 45 1 kg/m²  Physical Exam  Constitutional:       Appearance: She is obese  HENT:      Head: Normocephalic and atraumatic  Nose: Nose normal       Mouth/Throat:      Mouth: Mucous membranes are dry  Eyes:      Pupils: Pupils are equal, round, and reactive to light  Cardiovascular:      Rate and Rhythm: Normal rate and regular rhythm  Pulses: Normal pulses  Pulmonary:      Effort: Pulmonary effort is normal       Breath sounds: Normal breath sounds  Abdominal:      General: Abdomen is flat  Musculoskeletal:         General: Normal range of motion  Cervical back: Normal range of motion  Skin:     General: Skin is warm and dry  Capillary Refill: Capillary refill takes less than 2 seconds  Neurological:      General: No focal deficit present  Mental Status: She is alert and oriented to person, place, and time     Psychiatric:         Mood and Affect: Mood normal          Lab Review:   Lab on 07/11/2022   Component Date Value    Sodium 07/11/2022 136     Potassium 07/11/2022 4 7     Chloride 07/11/2022 101     CO2 07/11/2022 31     ANION GAP 07/11/2022 4     BUN 07/11/2022 57 (A)    Creatinine 07/11/2022 1 69 (A)    Glucose 07/11/2022 278 (A)    Calcium 07/11/2022 9 3     eGFR 07/11/2022 30     WBC 07/11/2022 7 86     RBC 07/11/2022 4 10     Hemoglobin 07/11/2022 11 3 (A)    Hematocrit 07/11/2022 37 0     MCV 07/11/2022 90     MCH 07/11/2022 27 6     MCHC 07/11/2022 30 5 (A)    RDW 07/11/2022 14 6     Platelets 85/39/6715 137 (A)    MPV 07/11/2022 12 5 Lab on 06/16/2022   Component Date Value    Sodium 06/16/2022 139     Potassium 06/16/2022 4 7     Chloride 06/16/2022 101     CO2 06/16/2022 32     ANION GAP 06/16/2022 6     BUN 06/16/2022 77 (A)    Creatinine 06/16/2022 1 62 (A)    Glucose, Fasting 06/16/2022 223 (A)    Calcium 06/16/2022 9 5     eGFR 06/16/2022 32     WBC 06/16/2022 6 75     RBC 06/16/2022 3 82     Hemoglobin 06/16/2022 10 5 (A)    Hematocrit 06/16/2022 35 4     MCV 06/16/2022 93     MCH 06/16/2022 27 5     MCHC 06/16/2022 29 7 (A)    RDW 06/16/2022 14 2     Platelets 81/76/4149 186     MPV 06/16/2022 11 7    Office Visit on 06/14/2022   Component Date Value    Supplier Name 06/14/2022 [employer]     Supplier Phone Number 06/14/2022 [phone]     Order Status 06/14/2022 Contacting Patient     Delivery Request Date 06/14/2022 06/14/2022     Item Description 06/14/2022 Overnight Oximetry Test    Admission on 06/04/2022, Discharged on 06/12/2022   Component Date Value    WBC 06/04/2022 8 08     RBC 06/04/2022 3 90     Hemoglobin 06/04/2022 11 0 (A)    Hematocrit 06/04/2022 35 8     MCV 06/04/2022 92     MCH 06/04/2022 28 2     MCHC 06/04/2022 30 7 (A)    RDW 06/04/2022 14 5     MPV 06/04/2022 11 2     Platelets 97/60/6210 181     nRBC 06/04/2022 0     Neutrophils Relative 06/04/2022 58     Immat GRANS % 06/04/2022 0     Lymphocytes Relative 06/04/2022 25     Monocytes Relative 06/04/2022 10     Eosinophils Relative 06/04/2022 6     Basophils Relative 06/04/2022 1     Neutrophils Absolute 06/04/2022 4 66     Immature Grans Absolute 06/04/2022 0 03     Lymphocytes Absolute 06/04/2022 2 05     Monocytes Absolute 06/04/2022 0 77     Eosinophils Absolute 06/04/2022 0 50     Basophils Absolute 06/04/2022 0 07     Protime 06/04/2022 15 7 (A)    INR 06/04/2022 1 25 (A)    PTT 06/04/2022 31     Sodium 06/04/2022 139     Potassium 06/04/2022 4 5     Chloride 06/04/2022 98  CO2 06/04/2022 32     ANION GAP 06/04/2022 9     BUN 06/04/2022 69 (A)    Creatinine 06/04/2022 1 62 (A)    Glucose 06/04/2022 196 (A)    Calcium 06/04/2022 8 8     AST 06/04/2022 15     ALT 06/04/2022 12     Alkaline Phosphatase 06/04/2022 61     Total Protein 06/04/2022 7 6     Albumin 06/04/2022 3 8     Total Bilirubin 06/04/2022 0 37     eGFR 06/04/2022 32     BNP 06/04/2022 74     hs TnI 0hr 06/04/2022 6     hs TnI 2hr 06/04/2022 6     Delta 2hr hsTnI 06/04/2022 0     hs TnI 4hr 06/04/2022 7     Delta 4hr hsTnI 06/04/2022 1     POC Glucose 06/04/2022 182 (A)    POC Glucose 06/04/2022 206 (A)    Sodium 06/05/2022 141     Potassium 06/05/2022 4 3     Chloride 06/05/2022 100     CO2 06/05/2022 36 (A)    ANION GAP 06/05/2022 5     BUN 06/05/2022 68 (A)    Creatinine 06/05/2022 1 64 (A)    Glucose 06/05/2022 224 (A)    Calcium 06/05/2022 8 6     eGFR 06/05/2022 31     Magnesium 06/05/2022 2 0     Ventricular Rate 06/04/2022 75     Atrial Rate 06/04/2022 78     OH Interval 06/04/2022 192     QRSD Interval 06/04/2022 70     QT Interval 06/04/2022 362     QTC Interval 06/04/2022 405     P Axis 06/04/2022 63     QRS Axis 06/04/2022 -36     T Wave Axis 06/04/2022 33     POC Glucose 06/05/2022 215 (A)    POC Glucose 06/05/2022 269 (A)    POC Glucose 06/05/2022 222 (A)    POC Glucose 06/05/2022 288 (A)    Sodium 06/06/2022 140     Potassium 06/06/2022 4 2     Chloride 06/06/2022 97     CO2 06/06/2022 36 (A)    ANION GAP 06/06/2022 7     BUN 06/06/2022 66 (A)    Creatinine 06/06/2022 1 46 (A)    Glucose 06/06/2022 151 (A)    Calcium 06/06/2022 8 4     eGFR 06/06/2022 36     POC Glucose 06/06/2022 171 (A)    POC Glucose 06/06/2022 300 (A)    POC Glucose 06/06/2022 231 (A)    POC Glucose 06/06/2022 206 (A)    Sodium 06/07/2022 139     Potassium 06/07/2022 4 4     Chloride 06/07/2022 96     CO2 06/07/2022 37 (A)    ANION GAP 06/07/2022 6     BUN 06/07/2022 70 (A)    Creatinine 06/07/2022 1 52 (A)    Glucose 06/07/2022 124     Calcium 06/07/2022 8 6     eGFR 06/07/2022 34     POC Glucose 06/07/2022 128     POC Glucose 06/07/2022 159 (A)    POC Glucose 06/07/2022 165 (A)    POC Glucose 06/07/2022 240 (A)    Sodium 06/08/2022 137     Potassium 06/08/2022 3 9     Chloride 06/08/2022 95 (A)    CO2 06/08/2022 35 (A)    ANION GAP 06/08/2022 7     BUN 06/08/2022 82 (A)    Creatinine 06/08/2022 1 71 (A)    Glucose 06/08/2022 190 (A)    Calcium 06/08/2022 8 7     eGFR 06/08/2022 30     POC Glucose 06/08/2022 170 (A)    POC Glucose 06/08/2022 248 (A)    POC Glucose 06/08/2022 240 (A)    POC Glucose 06/08/2022 203 (A)    Sodium 06/09/2022 137     Potassium 06/09/2022 4 2     Chloride 06/09/2022 94 (A)    CO2 06/09/2022 35 (A)    ANION GAP 06/09/2022 8     BUN 06/09/2022 82 (A)    Creatinine 06/09/2022 1 50 (A)    Glucose 06/09/2022 188 (A)    Calcium 06/09/2022 8 8     eGFR 06/09/2022 35     POC Glucose 06/09/2022 190 (A)    POC Glucose 06/09/2022 288 (A)    POC Glucose 06/09/2022 257 (A)    POC Glucose 06/09/2022 220 (A)    POC Glucose 06/09/2022 249 (A)    Sodium 06/10/2022 137     Potassium 06/10/2022 4 2     Chloride 06/10/2022 94 (A)    CO2 06/10/2022 35 (A)    ANION GAP 06/10/2022 8     BUN 06/10/2022 91 (A)    Creatinine 06/10/2022 2 06 (A)    Glucose 06/10/2022 128     Calcium 06/10/2022 8 4     eGFR 06/10/2022 24     POC Glucose 06/10/2022 161 (A)    POC Glucose 06/10/2022 260 (A)    POC Glucose 06/10/2022 267 (A)    Blood Culture 06/10/2022 No Growth After 5 Days   Blood Culture 06/10/2022 No Growth After 5 Days       POC Glucose 06/10/2022 277 (A)    Sodium 06/11/2022 136     Potassium 06/11/2022 4 1     Chloride 06/11/2022 93 (A)    CO2 06/11/2022 36 (A)    ANION GAP 06/11/2022 7     BUN 06/11/2022 98 (A)    Creatinine 06/11/2022 1 94 (A)    Glucose 06/11/2022 180 (A)    Calcium 06/11/2022 8 4     eGFR 06/11/2022 25     Magnesium 06/11/2022 2 3     WBC 06/11/2022 7 01     RBC 06/11/2022 3 71 (A)    Hemoglobin 06/11/2022 10 2 (A)    Hematocrit 06/11/2022 34 0 (A)    MCV 06/11/2022 92     MCH 06/11/2022 27 5     MCHC 06/11/2022 30 0 (A)    RDW 06/11/2022 15 1     MPV 06/11/2022 11 5     Platelets 78/22/6855 119 (A)    nRBC 06/11/2022 0     Neutrophils Relative 06/11/2022 51     Immat GRANS % 06/11/2022 0     Lymphocytes Relative 06/11/2022 28     Monocytes Relative 06/11/2022 14 (A)    Eosinophils Relative 06/11/2022 6     Basophils Relative 06/11/2022 1     Neutrophils Absolute 06/11/2022 3 54     Immature Grans Absolute 06/11/2022 0 03     Lymphocytes Absolute 06/11/2022 1 94     Monocytes Absolute 06/11/2022 1 00     Eosinophils Absolute 06/11/2022 0 45     Basophils Absolute 06/11/2022 0 05     POC Glucose 06/11/2022 166 (A)    POC Glucose 06/11/2022 314 (A)    POC Glucose 06/11/2022 154 (A)    POC Glucose 06/11/2022 235 (A)    Sodium 06/12/2022 138     Potassium 06/12/2022 4 0     Chloride 06/12/2022 95 (A)    CO2 06/12/2022 35 (A)    ANION GAP 06/12/2022 8     BUN 06/12/2022 97 (A)    Creatinine 06/12/2022 1 93 (A)    Glucose 06/12/2022 184 (A)    Calcium 06/12/2022 8 7     eGFR 06/12/2022 26     POC Glucose 06/12/2022 202 (A)    POC Glucose 06/12/2022 267 (A)    POC Glucose 06/12/2022 264 (A)   Admission on 05/31/2022, Discharged on 05/31/2022   Component Date Value    NT-proBNP 05/31/2022 1,859 (A)    hs TnI 0hr 05/31/2022 8     Sodium 05/31/2022 138     Potassium 05/31/2022 5 3     Chloride 05/31/2022 102     CO2 05/31/2022 31     ANION GAP 05/31/2022 5     BUN 05/31/2022 64 (A)    Creatinine 05/31/2022 1 79 (A)    Glucose 05/31/2022 246 (A)    Calcium 05/31/2022 8 3     Corrected Calcium 05/31/2022 9 0     AST 05/31/2022 27     ALT 05/31/2022 20     Alkaline Phosphatase 05/31/2022 64     Total Protein 05/31/2022 7 3     Albumin 05/31/2022 3 1 (A)  Total Bilirubin 05/31/2022 0 26     eGFR 05/31/2022 28     Ventricular Rate 05/31/2022 69     Atrial Rate 05/31/2022 69     CT Interval 05/31/2022 192     QRSD Interval 05/31/2022 80     QT Interval 05/31/2022 400     QTC Interval 05/31/2022 428     P Axis 05/31/2022 5     QRS Axis 05/31/2022 -18     T Wave [place] 05/31/2022 24    Appointment on 05/31/2022   Component Date Value    WBC 05/31/2022 7 28     RBC 05/31/2022 3 67 (A)    Hemoglobin 05/31/2022 10 3 (A)    Hematocrit 05/31/2022 35 2     MCV 05/31/2022 96     MCH 05/31/2022 28 1     MCHC 05/31/2022 29 3 (A)    RDW 05/31/2022 14 5     Platelets 73/94/3642 195     MPV 05/31/2022 11 9     Sodium 05/31/2022 136     Potassium 05/31/2022 5 3     Chloride 05/31/2022 104     CO2 05/31/2022 25     ANION GAP 05/31/2022 7     BUN 05/31/2022 59 (A)    Creatinine 05/31/2022 1 90 (A)    Glucose 05/31/2022 241 (A)    Calcium 05/31/2022 9 2     Corrected Calcium 05/31/2022 9 8     AST 05/31/2022 31     ALT 05/31/2022 21     Alkaline Phosphatase 05/31/2022 73     Total Protein 05/31/2022 7 8     Albumin 05/31/2022 3 3 (A)    Total Bilirubin 05/31/2022 0 30     eGFR 05/31/2022 26    No results displayed because visit has over 200 results        Admission on 05/13/2022, Discharged on 05/13/2022   Component Date Value    WBC 05/13/2022 8 30     RBC 05/13/2022 3 30 (A)    Hemoglobin 05/13/2022 9 2 (A)    Hematocrit 05/13/2022 31 4 (A)    MCV 05/13/2022 95     MCH 05/13/2022 27 9     MCHC 05/13/2022 29 3 (A)    RDW 05/13/2022 14 3     MPV 05/13/2022 10 5     Platelets 94/37/1026 174     nRBC 05/13/2022 0     Neutrophils Relative 05/13/2022 58     Immat GRANS % 05/13/2022 0     Lymphocytes Relative 05/13/2022 26     Monocytes Relative 05/13/2022 10     Eosinophils Relative 05/13/2022 5     Basophils Relative 05/13/2022 1     Neutrophils Absolute 05/13/2022 4 87     Immature Grans Absolute 05/13/2022 0 03     Lymphocytes Absolute 05/13/2022 2 12     Monocytes Absolute 05/13/2022 0 84     Eosinophils Absolute 05/13/2022 0 38     Basophils Absolute 05/13/2022 0 06     Protime 05/13/2022 14 8 (A)    INR 05/13/2022 1 18     PTT 05/13/2022 33     Sodium 05/13/2022 141     Potassium 05/13/2022 4 4     Chloride 05/13/2022 99 (A)    CO2 05/13/2022 37 (A)    ANION GAP 05/13/2022 5     BUN 05/13/2022 36 (A)    Creatinine 05/13/2022 1 49 (A)    Glucose 05/13/2022 282 (A)    Calcium 05/13/2022 8 0 (A)    Corrected Calcium 05/13/2022 8 7     AST 05/13/2022 10     ALT 05/13/2022 15     Alkaline Phosphatase 05/13/2022 91     Total Protein 05/13/2022 7 4     Albumin 05/13/2022 3 1 (A)    Total Bilirubin 05/13/2022 0 19 (A)    eGFR 05/13/2022 35     Magnesium 05/13/2022 1 8     hs TnI 0hr 05/13/2022 9     NT-proBNP 05/13/2022 1,548 (A)    hs TnI 2hr 05/13/2022 10     Delta 2hr hsTnI 05/13/2022 1     Color, UA 05/13/2022 Yellow     Clarity, UA 05/13/2022 Clear     Specific Gravity, UA 05/13/2022 1 025     pH, UA 05/13/2022 5 5     Leukocytes, UA 05/13/2022 Trace (A)    Nitrite, UA 05/13/2022 Negative     Protein, UA 05/13/2022 30 (1+) (A)    Glucose, UA 05/13/2022 Negative     Ketones, UA 05/13/2022 Negative     Urobilinogen, UA 05/13/2022 0 2     Bilirubin, UA 05/13/2022 Negative     Occult Blood, UA 05/13/2022 Trace-Intact (A)    RBC, UA 05/13/2022 1-2     WBC, UA 05/13/2022 20-30 (A)    Epithelial Cells 05/13/2022 Moderate (A)    Bacteria, UA 05/13/2022 Occasional     MUCUS THREADS 05/13/2022 Occasional (A)    Urine Culture 05/13/2022 50,000-59,000 cfu/ml      Ventricular Rate 05/13/2022 85     Atrial Rate 05/13/2022 85     MN Interval 05/13/2022 186     QRSD Interval 05/13/2022 72     QT Interval 05/13/2022 372     QTC Interval 05/13/2022 442     P Axis 05/13/2022 56     QRS Axis 05/13/2022 8     T Wave [place] 05/13/2022 27    Appointment on 04/12/2022   Component Date Value    Urine Culture 04/12/2022 50,000-59,000 cfu/ml     Lab on 04/12/2022   Component Date Value    WBC 04/12/2022 8 74     RBC 04/12/2022 3 42 (A)    Hemoglobin 04/12/2022 9 6 (A)    Hematocrit 04/12/2022 33 2 (A)    MCV 04/12/2022 97     MCH 04/12/2022 28 1     MCHC 04/12/2022 28 9 (A)    RDW 04/12/2022 15 7 (A)    MPV 04/12/2022 10 8     Platelets 99/84/0695 212     nRBC 04/12/2022 0     Neutrophils Relative 04/12/2022 60     Immat GRANS % 04/12/2022 1     Lymphocytes Relative 04/12/2022 23     Monocytes Relative 04/12/2022 9     Eosinophils Relative 04/12/2022 5     Basophils Relative 04/12/2022 2 (A)    Neutrophils Absolute 04/12/2022 5 28     Immature Grans Absolute 04/12/2022 0 06     Lymphocytes Absolute 04/12/2022 2 03     Monocytes Absolute 04/12/2022 0 80     Eosinophils Absolute 04/12/2022 0 43     Basophils Absolute 04/12/2022 0 14 (A)    Sodium 04/12/2022 137     Potassium 04/12/2022 5 1     Chloride 04/12/2022 104     CO2 04/12/2022 29     ANION GAP 04/12/2022 4     BUN 04/12/2022 24     Creatinine 04/12/2022 1 12     Glucose 04/12/2022 195 (A)    Calcium 04/12/2022 9 0     Corrected Calcium 04/12/2022 9 8     AST 04/12/2022 25     ALT 04/12/2022 18     Alkaline Phosphatase 04/12/2022 76     Total Protein 04/12/2022 7 4     Albumin 04/12/2022 3 0 (A)    Total Bilirubin 04/12/2022 0 18 (A)    eGFR 04/12/2022 50    There may be more visits with results that are not included         Past Surgical History:   Procedure Laterality Date    APPENDECTOMY      CYSTOSCOPY W/ LASER LITHOTRIPSY Left 7/12/2016    Procedure: CYSTOSCOPY URETEROSCOPY WITH LITHOTRIPSY HOLMIUM LASER, RETROGRADE PYELOGRAM AND INSERTION STENT URETERAL;  Surgeon: Krystyna Hernandez MD;  Location: [address] [place] [address];  Service:     DILATION AND CURETTAGE OF UTERUS      IR THORACENTESIS  3/18/2022    JOINT REPLACEMENT      right knee    KNEE ARTHROPLASTY Right     [place] CYSTOURETHROSCOPY,URETER CATHETER Left 6/29/2016 Procedure: CYSTOSCOPY RETROGRADE PYELOGRAM WITH INSERTION STENT URETERAL, left;  Surgeon: Rose Ray MD;  Location: [address] [place] [address];  Service: Urology    FL RECONSTR TOTAL SHOULDER IMPLANT Left 3/1/2022    Procedure: ARTHROPLASTY SHOULDER REVERSE;  Surgeon: Tiffany Feliciano MD;  Location[facility];  Service: Orthopedics    SHOULDER ARTHROTOMY Left         Family History   Problem Relation Age of Onset    Heart disease Mother     Emphysema Maternal Grandmother     Heart disease Family         Diagnostics:  I have personally reviewed pertinent films in PACS    Office Spirometry Results:           Bedside spirometry with bronchodilator Pre/Post    Result Date: 2022  Narrative: Pulmonary Function Test Report Beny Katz 73 y o  female MRN: [id number] Date of Testin2022 Date of Interpretation: 2022 Requesting Physician: Dr Pretty Monahan Reason for Testing: SOB Reference set for interpretation:  Procedure: Testing was completed bedside  The patient demonstrated good effort and cooperation  The results of this test meet ATS standards for acceptability and repeatability  Data set appears appropriate for interpretation  Post bronchodilator testing performed after the administration of 2 5mg albuterol in 3cc normal saline administered via nebulizer per bronchodilator protocol  Results: FEV1/FVC Ratio: 73 % Forced Vital Capacity: 0 99 L 36 % predicted FEV1: 0 72 L 34 % predicted Interpretation: · Spirometry suggests abnormal lung volumes  Recommend checking lung volumes for further evaluation  · No significant response to the administration to bronchodilator per ATS standards · Flow volume loop is restricted  EVETTE Billingsley   [facility]

## 2022-07-15 NOTE — PROGRESS NOTES
Heart Failure Outpatient Consult Note - Brookie Kawasaki 73 y o  female MRN: [id number]    Encounter: [id number]      Assessment/Plan:    Patient Active Problem List    Diagnosis Date Noted    Cellulitis of left upper extremity 07/11/2022    Chronic respiratory failure with hypoxia and hypercapnia (HCC) 06/14/2022    Platelets decreased (HCC) 06/14/2022    Thrombophlebitis of arm, right 06/11/2022    Elevated serum creatinine 06/08/2022    KATRINA (obstructive sleep apnea) 06/04/2022    Obesity hypoventilation syndrome ([facility] ) 05/17/2022    Acute on chronic respiratory failure with hypoxia and hypercapnia ([facility]) 05/16/2022    Morbid obesity ([facility] ) 04/22/2022    Lump of left breast 04/08/2022    Pressure injury of deep tissue of sacral region 04/08/2022    Pulmonary nodules 03/30/2022    Acute on chronic diastolic (congestive) heart failure ([facility] ) 03/29/2022    Peripheral venous insufficiency 03/25/2022    Post-herpetic polyneuropathy 03/25/2022    Raynaud's disease 03/25/2022    Lung nodule < 6cm on CT 03/25/2022    CKD (chronic kidney disease)     Status post reverse total replacement of left shoulder 03/09/2022    Closed fracture of proximal end of left humerus with routine healing 03/01/2022    PONV (postoperative nausea and vomiting) 02/28/2022    Diabetes mellitus, type 2 ([facility] ) 02/28/2022    Gastroesophageal reflux disease 02/28/2022    Nephrolithiasis 02/28/2022    Arthritis 02/28/2022    S/P total knee replacement, right 02/28/2022    On continuous oral anticoagulation - eliquis 02/28/2022    Humeral head fracture 02/17/2022    Essential hypertension 08/01/2019    Anemia 07/31/2019    Mixed hyperlipidemia 06/25/2019    Paroxysmal atrial fibrillation (HCC) 05/28/2019    Lower leg edema 05/27/2019    Asthma 03/08/2018    Morbid obesity with BMI of 45 0-49 9, adult ([facility] ) 03/08/2018       Acute on chronic heart failure with preserved ejection fraction    Weight: 242 lbs 7/15/22  NT proBNP: 1859 5/31/22    Studies- personally reviewed by me    Echocardiogram 3/10/22  LVEF: 53%  LVIDd: normal cavity size  RV: normal size and systolic function  MR: none  PASP: inadequate TR envelope for estimation  RVOT: no notching, more parabolic  Other: unable to assess diastolic function, mildly increased wall thickness    Pharm Nuc Stress 7/31/2019: There was a small, mildly severe, fixed myocardial perfusion defect of the apical apical and anterior wall likely due to attenuation from breast tissue  Echo 5/29/22: LVEF 55%  LVIDD 4 1cm  Mild to moderately increased wall thickness  Grade 1 diastology  Mild MR  Normal RV size and systolic function  Diet:  2 g sodium diet  2000 mL fluid restriction    Therapeutic  Torsemide 20mg BID  Metoprolol 50mg dailiy    Paroxysmal atrial fibrillation  Rate: metoprolol as above  AC: apixaban 5mg daily  PSVT  Hypertension, controlled  Dyslipidemia  DM type 2 on insulin  HbA1c 6 8 3/9/22  CKD stage 3b, New baseline1 7-1 9, previously 1 4-1 6  Followed by Nephrology  KATRINA   Obesity hypoventilation syndrome  History of tobacco use, 20 pack years, quit 25 years ago  Lymphedema    Today's Plan:  Increase torsemide to 40mg in AM and 20mg in PM  BMP in 1 week  Nuclear stress test to assess for ischemia when volume optimized and able to lay down  Since with recurrent admissions for volume overload and adherent to diet and medications, may benefit from PA sensor placement to aid in volume management  Sleep apnea management per pulmonary  2g sodium diet  2L fluid restriction  Daily weights  Scheduled follow up with Hadley Patel next week  Follow up with me in 2 months      HPI:   Pedro Luis Pierre is a 72 yo female with morbid obesity, chronic hypoxic and hypercapnic respiratory failure/OHS, type 2 diabetes mellitus, paroxysmal atrial fibrillation, PSVT, essential hypertension, mixed dyslipidemia, chronic heart failure preserved ejection fraction, chronic kidney disease stage IIIB    She follows with Dr Sunny Cruz  She has been chronically anticoagulated with apixaban  Felt to have chronic lower extremity lymphedema  Multiple admissions since 3/2022 after left shoulder replacement  She had 4 admissions then due to volume overload  Last admission at [address] on 6/4-12 when she presented with worsening shortness of breath, weight gain , despite up titration of torsemide 60 mg daily for few days, as an outpatient  She was diuresed and discharged on torsemide 20mg daily  Seen by Juan Pradhan and torsemide dose increased to 20mg BID and metoprolol dose decreased to 50mg daily  Patient reports adherence to medications and diet during times of hospitalization  She was 240 lbs last discharge  245 lbs on home scale today  She is short of breath walking short distances  Also with orthopnea, abdominal distension and orthopnea  No palpitations or lightheadedness  Past Medical History:   Diagnosis Date    Anemia     Asthma     COVID-19 January 2022    GERD (gastroesophageal reflux disease)     Hyperlipidemia     PONV (postoperative nausea and vomiting)     SVT (supraventricular tachycardia) (HCC)        Review of Systems   Constitutional: Positive for fatigue  Negative for chills and fever  HENT: Negative for ear pain and sore throat  Eyes: Negative for pain and visual disturbance  Respiratory: Positive for shortness of breath  Negative for cough  Cardiovascular: Positive for leg swelling  Negative for chest pain and palpitations  Gastrointestinal: Positive for abdominal distention  Negative for abdominal pain and vomiting  Genitourinary: Negative for dysuria and hematuria  Musculoskeletal: Negative for arthralgias and back pain  Skin: Negative for color change and rash  Neurological: Negative for seizures, syncope and light-headedness  All other systems reviewed and are negative         Allergies   Allergen Reactions    Penicillins Hives    Moxifloxacin Other (See Comments) unknown    Percocet [Oxycodone-Acetaminophen] Other (See Comments)     unknown    Zinc Acetate Other (See Comments)     unknown    Asa [Aspirin] GI Intolerance    Indocin [Indomethacin] Other (See Comments)     Made patient "loopy"    Other Other (See Comments)     unknown       Current Outpatient Medications:     acetaminophen (TYLENOL) 325 mg tablet, Take 650 mg by mouth every 6 (six) hours as needed for mild pain  , Disp: , Rfl:     albuterol (PROVENTIL HFA,VENTOLIN HFA) 90 mcg/act inhaler, Inhale 2 puffs every 6 (six) hours as needed for wheezing, Disp: 8 g, Rfl: 1    ammonium lactate (LAC-HYDRIN) 12 % lotion, APPLY TOPICALLY TO AFFECTED AREAS twice a day, Disp: , Rfl:     apixaban (ELIQUIS) 5 mg, Take 1 tablet (5 mg total) by mouth in the morning and 1 tablet (5 mg total) in the evening , Disp: 180 tablet, Rfl: 1    b complex-vitamin C-folic acid (NEPHROCAPS) 1 mg capsule, Take 1 capsule by mouth daily, Disp: 30 capsule, Rfl: 5    cephalexin (KEFLEX) 500 mg capsule, Take 1 capsule (500 mg total) by mouth every 6 (six) hours for 7 days, Disp: 28 capsule, Rfl: 0    docusate sodium (COLACE) 100 mg capsule, Take 100 mg by mouth in the morning, Disp: , Rfl:     glucose blood (OneTouch Ultra) test strip, Test twice daily  , Disp: 100 strip, Rfl: 2    insulin lispro (HumaLOG KwikPen) 100 units/mL injection pen, 3 times with meal according to sliding scale - >Blood Glucose 150 - 199: 2 Unit of Insulin, Blood Glucose 200 - 249: 4 Units of Insulin, Blood Glucose 250 - 299: 6 Units of Insulin, Blood Glucose 300 - 349: 8 Units of Insulin, Blood Glucose 350 - 399: 10 Units of Inuslin,Blood Glucose greater than or equal to 400: 12 Units of Insulin-please contact your physician, Disp: 15 mL, Rfl: 0    Insulin Pen Needle (BD Pen Needle Pilar 2nd Gen) 32G X 4 MM MISC, For use with insulin pen   Pharmacy may dispense brand covered by insurance , Disp: 100 each, Rfl: 0    Insulin Pen Needle (NovoFine [facility]greenHealthover) 30G X 8 MM MISC, Inject under the skin daily, Disp: 100 each, Rfl: 3    Insulin Pen Needle 30G X 8 MM MISC, 2 (two) times a day, Disp: , Rfl:     Lancets (onetouch ultrasoft) lancets, Use once daily, Disp: 100 each, Rfl: 2    metoprolol succinate (TOPROL-XL) 50 mg 24 hr tablet, Take 1 tablet (50 mg total) by mouth daily, Disp: 30 tablet, Rfl: 3    montelukast (SINGULAIR) 10 mg tablet, Take 10 mg by mouth daily  , Disp: , Rfl:     nystatin (MYCOSTATIN) cream, Apply topically 2 (two) times a day, Disp: 30 g, Rfl: 3    pregabalin (LYRICA) 100 mg capsule, Take 1 capsule (100 mg total) by mouth 2 (two) times a day, Disp: 180 capsule, Rfl: 1    rosuvastatin (CRESTOR) 10 MG tablet, 10 mg daily  , Disp: , Rfl: 0    sodium chloride (OCEAN) 0 65 % nasal spray, 1 spray into each nostril every hour as needed for congestion, Disp: , Rfl: 0    torsemide (DEMADEX) 20 mg tablet, Take 1 tablet (20 mg total) by mouth 2 (two) times a day, Disp: 90 tablet, Rfl: 1    Toujeo SoloStar 300 units/mL CONCENTRATED U-300 injection pen (1-unit dial), Inject 25 Units under the skin daily at bedtime, Disp: 4 5 mL, Rfl: 0    Trulicity 1 5 LL/1 6ZU SOPN, inject 0 5 milliliter subcutaneously every week 28, Disp: , Rfl:     Social History     Socioeconomic History    Marital status: Single     Spouse name: Not on file    Number of children: Not on file    Years of education: Not on file    Highest education level: Not on file   Occupational History    Not on file   Tobacco Use    Smoking status: Former Smoker     Packs/day: 1 00     Years: 20 00     Pack years: 20 00     Types: Cigarettes     Quit date: 1996     Years since quittin 0    Smokeless tobacco: Never Used   Vaping Use    Vaping Use: Never used   Substance and Sexual Activity    Alcohol use: Not Currently     Comment: rarely    Drug use: No    Sexual activity: Not Currently   Other Topics Concern    Not on file   Social History Narrative    Not on file Social Determinants of Health     Financial Resource Strain: Not on file   Food Insecurity: No Food Insecurity    Worried About Running Out of Food in the Last Year: Never true    Wes of Food in the Last Year: Never true   Transportation Needs: No Transportation Needs    Lack of Transportation (Medical): No    Lack of Transportation (Non-Medical): No   Physical Activity: Not on file   Stress: Not on file   Social Connections: Not on file   Intimate Partner Violence: Not on file   Housing Stability: Low Risk     Unable to Pay for Housing in the Last Year: No    Number of Places Lived in the Last Year: 2    Unstable Housing in the Last Year: No       Family History   Problem Relation Age of Onset    Heart disease Mother     Emphysema Maternal Grandmother     Heart disease Family        Physical Exam:    Vitals: Blood pressure 118/80, pulse 76, height 5' 2" (1 575 m), weight 110 kg (242 lb), not currently breastfeeding , Body mass index is 44 26 kg/m² ,   Wt Readings from Last 3 Encounters:   07/15/22 110 kg (242 lb)   07/15/22 112 kg (246 lb 9 6 oz)   07/11/22 113 kg (250 lb)       Physical Exam  Constitutional:       General: She is not in acute distress  Appearance: Normal appearance  HENT:      Head: Normocephalic and atraumatic  Mouth/Throat:      Mouth: Mucous membranes are moist    Eyes:      Extraocular Movements: Extraocular movements intact  Conjunctiva/sclera: Conjunctivae normal    Cardiovascular:      Rate and Rhythm: Normal rate and regular rhythm  Pulses: Normal pulses  Heart sounds: No murmur heard  No friction rub  No gallop  Comments: Elevated JVP, Mild edema  Pulmonary:      Effort: Pulmonary effort is normal  No respiratory distress  Breath sounds: No wheezing, rhonchi or rales  Abdominal:      General: There is no distension  Palpations: Abdomen is soft  Tenderness: There is no abdominal tenderness  There is no guarding  Musculoskeletal:         General: No deformity or signs of injury  Cervical back: Neck supple  Skin:     General: Skin is warm and dry  Capillary Refill: Capillary refill takes less than 2 seconds  Neurological:      General: No focal deficit present  Mental Status: She is alert and oriented to person, place, and time  Psychiatric:         Mood and Affect: Mood normal          Labs & Results:    Lab Results   Component Value Date    WBC 7 86 07/11/2022    HGB 11 3 (L) 07/11/2022    HCT 37 0 07/11/2022    MCV 90 07/11/2022     (L) 07/11/2022     Lab Results   Component Value Date    SODIUM 136 07/11/2022    K 4 7 07/11/2022     07/11/2022    CO2 31 07/11/2022    BUN 57 (H) 07/11/2022    CREATININE 1 69 (H) 07/11/2022    GLUC 278 (H) 07/11/2022    CALCIUM 9 3 07/11/2022     Lab Results   Component Value Date    NTBNP 1,859 (H) 05/31/2022      Lab Results   Component Value Date    CHOLESTEROL 104 03/10/2022     Lab Results   Component Value Date    HDL 30 (L) 03/10/2022     Lab Results   Component Value Date    TRIG 132 03/10/2022     No results found for: Padmaja    EKG personally reviewed by Yaya Garcia MD      Counseling / Coordination of Care  Time spent today 40 minutes  Greater than 50% of total time was spent with the patient and / or family counseling and / or coordination of care  We discussed diagnoses, most recent studies and any changes in treatment    Thank you for the opportunity to participate in the care of this patient      Hitesh Carpenter MD  ADVANCED HEART FAILURE AND MECHANICAL CIRCULATORY SUPPORT  [facility]

## 2022-07-15 NOTE — PATIENT INSTRUCTIONS
Increase torsemide to 40mg in AM and 20mg in PM  Obtain blood test (BMP) in 1 week  2g sodium diet  2L fluid restriction  Daily weights

## 2022-07-17 PROBLEM — I47.1 PSVT (PAROXYSMAL SUPRAVENTRICULAR TACHYCARDIA) (HCC): Status: ACTIVE | Noted: 2022-07-17

## 2022-07-17 PROBLEM — I47.10 PSVT (PAROXYSMAL SUPRAVENTRICULAR TACHYCARDIA): Status: ACTIVE | Noted: 2022-07-17

## 2022-07-17 NOTE — PROGRESS NOTES
Cardiology  Heart Failure   Follow Up Office Visit Note     Diane De Leon   73 y o    female   MRN: [id number]  [address]  [address] [address] [facility] [address]  [phone]    PCP: Flaco Piper MD  Heart failure specialist: Dr Shayy Donohue  Prior:  Dr Wilkes Speaks HF ADM: -22            Summary of Recommendations  Low-sodium diet, Heart failure education as below  If she has intercurrent weight gain, I recommend increasing torsemide to 40 mg the morning 40 mg in the p m  for 3 days  The patient and niece voiced understanding  Nonfasting BMP today  Pharmacological nuclear stress test  Follow with Dr Shayy Donohue 6- 8 weeks  I asked the patient to consider CardioMEMS  She expresses reluctance  Discuss at the next visit  Colon Ca screenin2022, up to date         Impression/plan  Chronic HFpEF  Dry wt 244 lb  Home scale in the last week 244-246  Her lowest weight was 240 at the time of her last discharge  Office weight with clothes and shoes on  Wt Readings from Last 3 Encounters:   22 112 kg (246 lb 4 oz)   07/15/22 110 kg (242 lb)   07/15/22 112 kg (246 lb 9 6 oz)     --beta-blocker:   metoprolol tartrate 50 mg q 12  Will switch to metoprolol succinate 50 mg daily starting tomorrow  Hold metoprolol tartrate tonight dose  --Diuretic:   torsemide 40 mg in the morning 20 mg in the p m  since 7/15/22  --ACE/ARB/ARNI:    --MRA:   --SLGT2I  --ICD:   --2 g sodium diet, 1800 cc fluid restriction  Daily weights, call weight gain 2-3 lb in 1 day or 5 lb in 5 days  Paroxysmal atrial fibrillation  Rate controlled with metoprolol tartrate 50 mg q 12  On anticoagulation with Eliquis 5 mg b i d  ( weight of 111 kg, age 73years old)  · On metoprolol succinate 50 mg daily, decreased from tartrate 50 b i d  in order to diurese a few weeks ago  PSVT  Hypertension, essential  /60  On metoprolol succinate 50 mg daily, , loop diuretic  Hyperlipidemia  On rosuvastatin 10 mg daily 3/10/22  LDL 48, non HDL74  Type 2 diabetes mellitus on insulin; Hemoglobin A1c 6 8  CKD stage IIIB  New baseline1 7-1 9, previously 1 4-1 6  Followed by Nephrology  chronic respiratory failure with hypoxia and hypercapnia  KATRINA nocturnal O2 3 L supplemental oxygen,   Obesity hypoventilation syndrome  Obesity, BMI 44  History tobacco use, 20 pack years; quitting 25 years  Lymphedema  Compression socks  Cardiac testing   TTE 3/10/22 EF 53%  Wall motion normal   Borderline LVH  RV systolic function low normal   Atria normal size  No significant changes compared to 5/29/19   ZIo patch event recorder 5/12/22  Patient had a min HR of 58 bpm, max HR of 150 bpm, and avg HR of 75  bpm  Predominant underlying rhythm was Sinus Rhythm  32  Supraventricular Tachycardia runs occurred, the run with the fastest  interval lasting 11 3 secs with a max rate of 150 bpm, the longest lasting  26 4 secs with an avg rate of 111 bpm              HPI:   Padmini Ross is a 72 yo female with morbid obesity, chronic hypoxic and hypercapnic respiratory failure/OHS, type 2 diabetes mellitus, paroxysmal atrial fibrillation, PSVT, essential hypertension, mixed dyslipidemia, chronic heart failure preserved ejection fraction, chronic kidney disease stage IIIB  She follows with Dr Fox Bach  She has been chronically anticoagulated with apixaban  Felt to have chronic lower extremity lymphedema  Multiple recent admissions:    03/01/2022 left shoulder replacement     Admitted 3/28-4/2/22   [facility]  leukocytosis, fever, suspected complicated UTI/sepsis  Elevated creatinine  Followed by Renal  Gently hydrated  Torsemide was held  Creatinine returned to baseline prior to discharge  AFib with RVR, decompensated heart failure   Diuresed      Discharge creatinine  Discharge diuretic: Torsemide 10 mg daily  Discharge weight: 266 lb     At her OP office visit with Cardiology she was in sinus rhythm    Adm 5/16-5/23/22  [place]  AFib with RVR, lower extremity edema  Diuresed   Placed on CPAP    5/24/22 OV Dr Vahe Tong  Felt to be compliant with her diuretic, and watching her diet  Noncompliant with CPAP due to claustrophobia    ER admission 5/31/22    [facility]  Chief complaint:  10 lb weight gain, shortness of breath, chest pain  ProBNP 1859  Troponin 8  EKG showed normal sinus rhythm  Chest x-ray showing improving CHF  Patient evaluated at bedside by Dr Vahe Tong - recommended increasing torsemide dosage to 60 mg, repeat BNP as an outpatient in 5 days, continued outpatient follow-up  Adm 6/4-6/12/22  8d   [facility]  CC: Worsening shortness of breath, weight gain , despite up titration of torsemide 60 mg daily for few days, as an outpatient  Diuresed with IV Bumex   Followed by Cardiology  Dx acute on chronic heart failure  Discharge weight :248 lb  Discharge creatinine: 1 93  Discharge diuretics: Torsemide 20 mg daily    7/6/22  Hospital follow-up  She is accompanied by her niece who helps her with her medical therapy  ROS:  In the last few days she has not been feeling well  She has been lightheaded, she is dizzy  She takes her blood pressure at home it has been low  She has had a weight gain of 3 lb since yesterday  She has lower extremity edema  She is drinking much less than her 64 oz a day that is recommended  Sometimes she holds the metoprolol because her blood pressure is too low  She sleeps in a hospital bed with the head elevated, since February hospital admission  Blood pressure in the office today 90/60  Heart rate 64 and regular  On exam, she is in decompensated heart failure; she has diminished breath sounds bilaterally, she has 2 to 3+ bilateral lower extremity edema  Today, I recommend we try to get her blood pressure up and then diurese her    I recommend she drink 64 oz of fluid a day  Hold metoprolol the rest of the day  Switch to metoprolol succinate 50 mg daily starting tomorrow  Continue to take torsemide 20 mg daily tomorrow, starting July 8th will increase this to 20 mg b i d  She can use her supplemental oxygen, during the day if needed  I emphasized a salt restricted diet  She will return to see me in 1-2 weeks, Dr Rachel Dupont heart failure specialist new patient visit in about 9 days      Interval history  7/11 OV PCP    7/15 OV Pulm    7/15 Dr Rachel Dupont  Weight up 245, 5 lb from prior  NAILS with mild activities  Compliant with her medical regimen  Torsemide increased to 40 mg the morning, 20 mg in the p m  from 20 b i d   BMP 1 week  Nuclear stress test when able to lie flat  She may benefit from CardioMEMS  Treatment of sleep apnea per pulmonology  Follow-up in [place] 1 week; Dr Rachel Dupont 2 months      7/20/22  Close follow-up, heart failure  She is accompanied by her niece  ROS: Overall, she feels improved  Decrease lower extremity edema  Breathing has improved  Still with NAILS  Able to lie flat  Home weight 244 to 246  Dry weight 244  /60  Breath sounds are clear  She does have +1 lower extremity edema  Labs- will have drawn today  She related reluctance at the [address] [place] [address]  We discussed the benefit  I asked her to reconsider  She will discuss with Dr Rachel Dupont  Recommended a stress test   The patient is agreeable  Will order today  If her weight increases 3 lb, I recommend increasing the torsemide to 40 mg b i d  for 3 days  She has not required potassium supplementation  Will assess BMP today      I have spent 25 minutes with Patient and family today in which greater than 50% of this time was spent in counseling/coordination of care regarding Patient and family education, Importance of tx compliance, Risk factor reductions and Impressions  Assessment  Diagnoses and all orders for this visit:    Chronic diastolic heart failure ([address] )  -     NM myocardial perfusion spect (rx stress and/or rest);  Future    Parox[facility]mal [facility] fibrillation [facility] )    Mixed hyperlipidemia  -     NM myocardial perfusion spect (rx stress and/or rest); Future    Stage 3b chronic kidney disease ([facility] )    Essential hypertension  -     NM myocardial perfusion spect (rx stress and/or rest); Future    PSVT (paroxysmal supraventricular tachycardia) ([facility])    KATRINA (obstructive sleep apnea)    NAILS (dyspnea on exertion)        Past Medical History:   Diagnosis Date    Anemia     Asthma     COVID-19 January 2022    GERD (gastroesophageal reflux disease)     Hyperlipidemia     PONV (postoperative nausea and vomiting)     SVT (supraventricular tachycardia) ([facility])        Review of Systems   Constitutional: Negative for chills  Cardiovascular: Positive for dyspnea on exertion  Negative for chest pain, claudication, cyanosis, irregular heartbeat, leg swelling, near-syncope, orthopnea, palpitations, paroxysmal nocturnal dyspnea and syncope  Respiratory: Negative for cough  Gastrointestinal: Negative for heartburn and nausea  Neurological: Negative for dizziness, focal weakness, headaches, light-headedness and weakness  All other systems reviewed and are negative  Allergies   Allergen Reactions    Penicillins Hives    Moxifloxacin Other (See Comments)     unknown    Percocet [Oxycodone-Acetaminophen] Other (See Comments)     unknown    Zinc Acetate Other (See Comments)     unknown    Asa [Aspirin] GI Intolerance    Indocin [Indomethacin] Other (See Comments)     Made patient "loopy"    Other Other (See Comments)     unknown           Current Outpatient Medications:     acetaminophen (TYLENOL) 325 mg tablet, Take 650 mg by mouth every 6 (six) hours as needed for mild pain  , Disp: , Rfl:     albuterol (PROVENTIL HFA,VENTOLIN HFA) 90 mcg/act inhaler, Inhale 2 puffs every 6 (six) hours as needed for wheezing, Disp: 8 g, Rfl: 1    ammonium lactate (LAC-HYDRIN) 12 % lotion, APPLY TOPICALLY TO AFFECTED AREAS twice a day, Disp: , Rfl:     apixaban (ELIQUIS) 5 mg, Take 1 tablet (5 mg total) by mouth in the morning and 1 tablet (5 mg total) in the evening , Disp: 180 tablet, Rfl: 1    b complex-vitamin C-folic acid (NEPHROCAPS) 1 mg capsule, Take 1 capsule by mouth daily, Disp: 30 capsule, Rfl: 5    docusate sodium (COLACE) 100 mg capsule, Take 100 mg by mouth in the morning, Disp: , Rfl:     glucose blood (OneTouch Ultra) test strip, Test twice daily  , Disp: 100 strip, Rfl: 2    insulin lispro (HumaLOG KwikPen) 100 units/mL injection pen, 3 times with meal according to sliding scale - >Blood Glucose 150 - 199: 2 Unit of Insulin, Blood Glucose 200 - 249: 4 Units of Insulin, Blood Glucose 250 - 299: 6 Units of Insulin, Blood Glucose 300 - 349: 8 Units of Insulin, Blood Glucose 350 - 399: 10 Units of Inuslin,Blood Glucose greater than or equal to 400: 12 Units of Insulin-please contact your physician, Disp: 15 mL, Rfl: 0    Insulin Pen Needle (BD Pen Needle Pilar 2nd Gen) 32G X 4 MM MISC, For use with insulin pen   Pharmacy may dispense brand covered by insurance , Disp: 100 each, Rfl: 0    Insulin Pen Needle (NovoFine Autocover) 30G X 8 MM MISC, Inject under the skin daily, Disp: 100 each, Rfl: 3    Insulin Pen Needle 30G X 8 MM MISC, 2 (two) times a day, Disp: , Rfl:     Lancets (onetouch ultrasoft) lancets, Use once daily, Disp: 100 each, Rfl: 2    metoprolol succinate (TOPROL-XL) 50 mg 24 hr tablet, Take 1 tablet (50 mg total) by mouth daily, Disp: 30 tablet, Rfl: 3    montelukast (SINGULAIR) 10 mg tablet, Take 10 mg by mouth daily  , Disp: , Rfl:     nystatin (MYCOSTATIN) cream, Apply topically 2 (two) times a day, Disp: 30 g, Rfl: 3    pregabalin (LYRICA) 100 mg capsule, Take 1 capsule (100 mg total) by mouth 2 (two) times a day, Disp: 180 capsule, Rfl: 1    rosuvastatin (CRESTOR) 10 MG tablet, 10 mg daily  , Disp: , Rfl: 0    sodium chloride (OCEAN) 0 65 % nasal spray, 1 spray into each nostril every hour as needed for congestion, Disp: , Rfl: 0    torsemide (DEMADEX) 20 mg tablet, Take 2 tablets in the AM and 1 tablet in PM, Disp: 90 tablet, Rfl: 1    Toujeo SoloStar 300 units/mL CONCENTRATED U-300 injection pen (1-unit dial), Inject 25 Units under the skin daily at bedtime, Disp: 4 5 mL, Rfl: 0    Trulicity 1 5 MM/4 6FJ SOPN, inject 0 5 milliliter subcutaneously every week 28, Disp: , Rfl:     Social History     Socioeconomic History    Marital status: Single     Spouse name: Not on file    Number of children: Not on file    Years of education: Not on file    Highest education level: Not on file   Occupational History    Not on file   Tobacco Use    Smoking status: Former Smoker     Packs/day:      Years:      Pack years:      Types: Cigarettes     Quit date: 1996     Years since quittin 0    Smokeless tobacco: Never Used   Vaping Use    Vaping Use: Never used   Substance and Sexual Activity    Alcohol use: Not Currently     Comment: rarely    Drug use: No    Sexual activity: Not Currently   Other Topics Concern    Not on file   Social History Narrative    Not on file     Social Determinants of Health     Financial Resource Strain: Not on file   Food Insecurity: No Food Insecurity    Worried About Running Out of Food in the Last Year: Never true    Wes of Food in the Last Year: Never true   Transportation Needs: No Transportation Needs    Lack of Transportation (Medical): No    Lack of Transportation (Non-Medical): No   Physical Activity: Not on file   Stress: Not on file   Social Connections: Not on file   Intimate Partner Violence: Not on file   Housing Stability: Low Risk     Unable to Pay for Housing in the Last Year: No    Number of Places Lived in the Last Year: 2    Unstable Housing in the Last Year: No       Family History   Problem Relation Age of Onset    Heart disease Mother     Emphysema Maternal Grandmother     Heart disease Family        Physical Exam  Vitals and nursing note reviewed  Constitutional:       General: She is not in acute distress  Appearance: She is obese  She is not diaphoretic  HENT:      Head: Normocephalic and atraumatic  Eyes:      Conjunctiva/sclera: Conjunctivae normal    Cardiovascular:      Rate and Rhythm: Normal rate and regular rhythm  Pulses: Intact distal pulses  Heart sounds: Normal heart sounds  Pulmonary:      Effort: Pulmonary effort is normal       Breath sounds: No rales  Abdominal:      General: Bowel sounds are normal       Palpations: Abdomen is soft  Musculoskeletal:         General: Normal range of motion  Cervical back: Normal range of motion and neck supple  Right lower leg: Edema present  Left lower leg: Edema present  Skin:     General: Skin is warm and dry  Neurological:      Mental Status: She is alert and oriented to person, place, and time  Vitals: Blood pressure 122/60, pulse 68, resp  rate 18, height 5' 2" (1 575 m), weight 112 kg (246 lb 4 oz), SpO2 100 %, not currently breastfeeding     Wt Readings from Last 3 Encounters:   22 112 kg (246 lb 4 oz)   07/15/22 110 kg (242 lb)   07/15/22 112 kg (246 lb 9 6 oz)         Labs & Results:  Lab Results   Component Value Date    WBC 7 86 2022    HGB 11 3 (L) 2022    HCT 37 0 2022    MCV 90 2022     (L) 2022     BNP   Date Value Ref Range Status   2022 74 0 - 100 pg/mL Final     No components found for: CHEM    Results for orders placed during the hospital encounter of 19    Echo complete with contrast if indicated    [address]  [address] [facility]  [phone]    Transthoracic Echocardiogram  2D, M-mode, Doppler, and Color Doppler    Study date:  29-May-2019    Patient: Ivan Cox  MR number: [id number]  Account number: [de-identified]  : 1950  Age: 79 years  Gender: Female  Status: Inpatient  Location: Bedside  Height: 62 in  Weight: 250 6 lb  BP: 133/ 62 mmHg    Indications: Tachycardia    Diagnoses: I11 9 - Hypertensive heart disease without heart failure    Sonographer:  QUETA Gomes  Referring Physician:  Roxana Serna MD  Group:  [facility]  Interpreting Physician:  Kathy Boyer DO    SUMMARY    LEFT VENTRICLE:  Systolic function was normal by visual assessment  Ejection fraction was estimated to be 55 %  There were no regional wall motion abnormalities  Wall thickness was mildly to moderately increased  Doppler parameters were consistent with abnormal left ventricular relaxation (grade 1 diastolic dysfunction)  MITRAL VALVE:  There was mild regurgitation  AORTIC VALVE:  There was no evidence for stenosis  There was no regurgitation  TRICUSPID VALVE:  There was mild regurgitation  Pulmonary artery systolic pressure was within the normal range  HISTORY: PRIOR HISTORY: Diabetes,Diabetes, HTN, Hyperlipidemia, A Fib  PROCEDURE: The procedure was performed at the bedside  This was a routine study  The transthoracic approach was used  The study included complete 2D imaging, M-mode, complete spectral Doppler, and color Doppler  The heart rate was 77 bpm,  at the start of the study  Images were obtained from the parasternal, apical, subcostal, and suprasternal notch acoustic windows  Echocardiographic views were limited due to restricted patient mobility, poor patient compliance, poor  acoustic window availability, decreased penetration, and lung interference  This was a technically difficult study  LEFT VENTRICLE: Size was normal  Systolic function was normal by visual assessment  Ejection fraction was estimated to be 55 %  There were no regional wall motion abnormalities  Wall thickness was mildly to moderately increased  DOPPLER:  Doppler parameters were consistent with abnormal left ventricular relaxation (grade 1 diastolic dysfunction)      RIGHT VENTRICLE: The size was normal  Systolic function was normal     LEFT ATRIUM: The atrium was mildly dilated  RIGHT ATRIUM: Size was normal     MITRAL VALVE: Valve structure was normal  There was normal leaflet separation  No echocardiographic evidence for prolapse  DOPPLER: The transmitral velocity was within the normal range  There was no evidence for stenosis  There was mild  regurgitation  AORTIC VALVE: The valve was trileaflet  Leaflets exhibited normal thickness, normal cuspal separation, and sclerosis  DOPPLER: Transaortic velocity was within the normal range  There was no evidence for stenosis  There was no  regurgitation  TRICUSPID VALVE: The valve structure was normal  There was normal leaflet separation  DOPPLER: The transtricuspid velocity was within the normal range  There was mild regurgitation  Pulmonary artery systolic pressure was within the normal  range  Estimated peak PA pressure was 32 mmHg  PULMONIC VALVE: Leaflets exhibited normal thickness, no calcification, and normal cuspal separation  DOPPLER: The transpulmonic velocity was within the normal range  There was no regurgitation  PERICARDIUM: There was no thickening  There was no pericardial effusion  AORTA: The root exhibited normal size  PULMONARY ARTERY: The size was normal  The morphology appeared normal     SYSTEM MEASUREMENT TABLES    2D mode  AoR Diam 2D: 3 6 cm  LA Diam (2D): 3 9 cm  LA/Ao (2D): 1 08  FS (2D Teich): 26 9 %  IVSd (2D): 1 29 cm  LVDEV: 75 1 cmï¾³  LVESV: 35 3 cmï¾³  LVIDd(2D): 4 12 cm  LVISd (2D): 3 01 cm  LVOT Area 2D: 3 14 cmï¾²  LVPWd (2D): 1 2 cm  SV (Teich): 39 8 cmï¾³    Apical four chamber  LVEF A4C: 51 %    Unspecified Scan Mode  HANNA Cont Eq (Peak Gutierrez): 2 58 cmï¾²  LVOT Diam : 2 cm  LVOT Vmax: 963 mm/s  LVOT Vmax; Mean: 963 mm/s  Peak Grad ; Mean: 4 mm[Hg]  MV Peak A Gutierrez: 893 mm/s  MV Peak E Gutierrez   Mean: 805 mm/s  MVA (PHT): 3 67 cmï¾²  PHT: 60 ms  Max P mm[Hg]  V Max: 2360 mm/s  Vmax: 2430 mm/s  RA Area: 12 8 cmï¾²  RA Volume: 27 6 cmï¾³  TAPSE: 2 cm    [facility] Accredited Echocardiography Laboratory    Prepared and electronically signed by    Carmelina Rosales DO  Signed 29-May-2019 16:19:07    No results found for this or any previous visit  This note was completed in part utilizing m-modal fluency direct voice recognition software  Grammatical errors, random word insertion, spelling mistakes, and incomplete sentences may be an occasional consequence of the system secondary to software limitations, ambient noise and hardware issues  At the time of dictation, efforts were made to edit, clarify and /or correct errors  Please read the chart carefully and recognize, using context, where substitutions have occurred    If you have any questions or concerns about the context, text or information contained within the body of this dictation, please contact myself, the provider, for further clarification

## 2022-07-20 ENCOUNTER — OFFICE VISIT (OUTPATIENT)
Dept: CARDIOLOGY CLINIC | Facility: CLINIC | Age: 72
End: 2022-07-20
Payer: MEDICARE

## 2022-07-20 ENCOUNTER — PATIENT OUTREACH (OUTPATIENT)
Dept: INTERNAL MEDICINE CLINIC | Facility: CLINIC | Age: 72
End: 2022-07-20

## 2022-07-20 ENCOUNTER — LAB (OUTPATIENT)
Dept: LAB | Facility: CLINIC | Age: 72
End: 2022-07-20
Payer: MEDICARE

## 2022-07-20 VITALS
RESPIRATION RATE: 18 BRPM | WEIGHT: 246.25 LBS | HEIGHT: 62 IN | OXYGEN SATURATION: 100 % | DIASTOLIC BLOOD PRESSURE: 60 MMHG | SYSTOLIC BLOOD PRESSURE: 122 MMHG | HEART RATE: 68 BPM | BODY MASS INDEX: 45.32 KG/M2

## 2022-07-20 DIAGNOSIS — D64.9 ANEMIA, UNSPECIFIED TYPE: ICD-10-CM

## 2022-07-20 DIAGNOSIS — I48.0 PAROXYSMAL ATRIAL FIBRILLATION (HCC): ICD-10-CM

## 2022-07-20 DIAGNOSIS — R06.09 DOE (DYSPNEA ON EXERTION): ICD-10-CM

## 2022-07-20 DIAGNOSIS — G47.33 OSA (OBSTRUCTIVE SLEEP APNEA): ICD-10-CM

## 2022-07-20 DIAGNOSIS — E11.00 TYPE 2 DIABETES MELLITUS WITH HYPEROSMOLARITY WITHOUT COMA, WITHOUT LONG-TERM CURRENT USE OF INSULIN (HCC): ICD-10-CM

## 2022-07-20 DIAGNOSIS — I47.1 PSVT (PAROXYSMAL SUPRAVENTRICULAR TACHYCARDIA) (HCC): ICD-10-CM

## 2022-07-20 DIAGNOSIS — I50.33 ACUTE ON CHRONIC HEART FAILURE WITH PRESERVED EJECTION FRACTION (HCC): ICD-10-CM

## 2022-07-20 DIAGNOSIS — E78.2 MIXED HYPERLIPIDEMIA: ICD-10-CM

## 2022-07-20 DIAGNOSIS — N18.31 STAGE 3A CHRONIC KIDNEY DISEASE (HCC): ICD-10-CM

## 2022-07-20 DIAGNOSIS — I10 ESSENTIAL HYPERTENSION: ICD-10-CM

## 2022-07-20 DIAGNOSIS — N18.32 STAGE 3B CHRONIC KIDNEY DISEASE (HCC): ICD-10-CM

## 2022-07-20 DIAGNOSIS — Z96.651 S/P TOTAL KNEE REPLACEMENT, RIGHT: ICD-10-CM

## 2022-07-20 DIAGNOSIS — I50.32 CHRONIC DIASTOLIC HEART FAILURE (HCC): Primary | ICD-10-CM

## 2022-07-20 DIAGNOSIS — R60.0 LOWER LEG EDEMA: ICD-10-CM

## 2022-07-20 DIAGNOSIS — N17.9 AKI (ACUTE KIDNEY INJURY) (HCC): ICD-10-CM

## 2022-07-20 DIAGNOSIS — I50.33 ACUTE ON CHRONIC DIASTOLIC (CONGESTIVE) HEART FAILURE (HCC): ICD-10-CM

## 2022-07-20 LAB
25(OH)D3 SERPL-MCNC: 23.9 NG/ML (ref 30–100)
ALBUMIN SERPL BCP-MCNC: 3.8 G/DL (ref 3.5–5)
ALP SERPL-CCNC: 57 U/L (ref 34–104)
ALT SERPL W P-5'-P-CCNC: 14 U/L (ref 7–52)
ANION GAP SERPL CALCULATED.3IONS-SCNC: 7 MMOL/L (ref 4–13)
AST SERPL W P-5'-P-CCNC: 14 U/L (ref 13–39)
BACTERIA UR QL AUTO: ABNORMAL /HPF
BILIRUB SERPL-MCNC: 0.35 MG/DL (ref 0.2–1)
BILIRUB UR QL STRIP: NEGATIVE
BUN SERPL-MCNC: 61 MG/DL (ref 5–25)
CALCIUM SERPL-MCNC: 9 MG/DL (ref 8.4–10.2)
CHLORIDE SERPL-SCNC: 98 MMOL/L (ref 96–108)
CHOLEST SERPL-MCNC: 141 MG/DL
CLARITY UR: CLEAR
CO2 SERPL-SCNC: 35 MMOL/L (ref 21–32)
COLOR UR: ABNORMAL
CREAT SERPL-MCNC: 1.69 MG/DL (ref 0.6–1.3)
CREAT UR-MCNC: 84.7 MG/DL
CREAT UR-MCNC: 86.8 MG/DL
ERYTHROCYTE [DISTWIDTH] IN BLOOD BY AUTOMATED COUNT: 14.6 % (ref 11.6–15.1)
EST. AVERAGE GLUCOSE BLD GHB EST-MCNC: 212 MG/DL
GFR SERPL CREATININE-BSD FRML MDRD: 30 ML/MIN/1.73SQ M
GLUCOSE P FAST SERPL-MCNC: 228 MG/DL (ref 65–99)
GLUCOSE UR STRIP-MCNC: NEGATIVE MG/DL
HBA1C MFR BLD: 9 %
HCT VFR BLD AUTO: 38.4 % (ref 34.8–46.1)
HDLC SERPL-MCNC: 35 MG/DL
HGB BLD-MCNC: 11.8 G/DL (ref 11.5–15.4)
HGB UR QL STRIP.AUTO: ABNORMAL
KETONES UR STRIP-MCNC: NEGATIVE MG/DL
LDLC SERPL CALC-MCNC: 31 MG/DL (ref 0–100)
LEUKOCYTE ESTERASE UR QL STRIP: ABNORMAL
MAGNESIUM SERPL-MCNC: 1.9 MG/DL (ref 1.9–2.7)
MCH RBC QN AUTO: 27.8 PG (ref 26.8–34.3)
MCHC RBC AUTO-ENTMCNC: 30.7 G/DL (ref 31.4–37.4)
MCV RBC AUTO: 90 FL (ref 82–98)
MICROALBUMIN UR-MCNC: 37.3 MG/L (ref 0–20)
MICROALBUMIN/CREAT 24H UR: 43 MG/G CREATININE (ref 0–30)
NITRITE UR QL STRIP: NEGATIVE
NON-SQ EPI CELLS URNS QL MICRO: ABNORMAL /HPF
NONHDLC SERPL-MCNC: 106 MG/DL
PH UR STRIP.AUTO: 5 [PH]
PHOSPHATE SERPL-MCNC: 4.6 MG/DL (ref 2.3–4.1)
PLATELET # BLD AUTO: 156 THOUSANDS/UL (ref 149–390)
PMV BLD AUTO: 10.7 FL (ref 8.9–12.7)
POTASSIUM SERPL-SCNC: 4.1 MMOL/L (ref 3.5–5.3)
PROT SERPL-MCNC: 7.5 G/DL (ref 6.4–8.4)
PROT UR STRIP-MCNC: NEGATIVE MG/DL
PROT UR-MCNC: 16 MG/DL
PROT/CREAT UR: 0.19 MG/G{CREAT} (ref 0–0.1)
PTH-INTACT SERPL-MCNC: 80.5 PG/ML (ref 18.4–80.1)
RBC # BLD AUTO: 4.25 MILLION/UL (ref 3.81–5.12)
RBC #/AREA URNS AUTO: ABNORMAL /HPF
SODIUM SERPL-SCNC: 140 MMOL/L (ref 135–147)
SP GR UR STRIP.AUTO: 1.01 (ref 1–1.03)
TRIGL SERPL-MCNC: 376 MG/DL
UROBILINOGEN UR STRIP-ACNC: <2 MG/DL
WBC # BLD AUTO: 7.02 THOUSAND/UL (ref 4.31–10.16)
WBC #/AREA URNS AUTO: ABNORMAL /HPF

## 2022-07-20 PROCEDURE — 81001 URINALYSIS AUTO W/SCOPE: CPT

## 2022-07-20 PROCEDURE — 82570 ASSAY OF URINE CREATININE: CPT | Performed by: INTERNAL MEDICINE

## 2022-07-20 PROCEDURE — 82043 UR ALBUMIN QUANTITATIVE: CPT | Performed by: INTERNAL MEDICINE

## 2022-07-20 PROCEDURE — 85027 COMPLETE CBC AUTOMATED: CPT

## 2022-07-20 PROCEDURE — 80061 LIPID PANEL: CPT

## 2022-07-20 PROCEDURE — 82306 VITAMIN D 25 HYDROXY: CPT

## 2022-07-20 PROCEDURE — 84156 ASSAY OF PROTEIN URINE: CPT

## 2022-07-20 PROCEDURE — 83970 ASSAY OF PARATHORMONE: CPT

## 2022-07-20 PROCEDURE — 83036 HEMOGLOBIN GLYCOSYLATED A1C: CPT

## 2022-07-20 PROCEDURE — 36415 COLL VENOUS BLD VENIPUNCTURE: CPT

## 2022-07-20 PROCEDURE — 84100 ASSAY OF PHOSPHORUS: CPT

## 2022-07-20 PROCEDURE — 83735 ASSAY OF MAGNESIUM: CPT

## 2022-07-20 PROCEDURE — 82570 ASSAY OF URINE CREATININE: CPT

## 2022-07-20 PROCEDURE — 80053 COMPREHEN METABOLIC PANEL: CPT

## 2022-07-20 PROCEDURE — 99214 OFFICE O/P EST MOD 30 MIN: CPT | Performed by: INTERNAL MEDICINE

## 2022-07-20 NOTE — LETTER
2022     Tee Huber, [address] [address]    Patient: Stephanie Valencia   YOB: 1950   Date of Visit: 2022       Dear Dr Sarabjit Grover: Thank you for referring Stephanie Valencia to me for evaluation  Below are my notes for this consultation  If you have questions, please do not hesitate to call me  I look forward to following your patient along with you  Sincerely,        RK Vega        CC: MD Mayito Turcios, [place]  2022  7:32 AM  Sign when Signing Visit  Cardiology  Heart Failure   Follow Up Office Visit Note     Stephanie Valencia   73 y o    female   MRN: [id number]  [address]  [place] [address] [facility] [place] [place] [address]  [phone]    PCP: Tee Huber MD  Heart failure specialist: Dr Marilyn Montaño  Prior:  Dr Graciela Danielle HF ADM: -22            Summary of Recommendations  Low-sodium diet, Heart failure education as below  If she has intercurrent weight gain, I recommend increasing torsemide to 40 mg the morning 40 mg in the p m  for 3 days  The patient and niece voiced understanding  Nonfasting BMP today  Pharmacological nuclear stress test  Follow with Dr Marilyn Montaño 6- 8 weeks  I asked the patient to consider CardioMEMS  She expresses reluctance  Discuss at the next visit  Colon Ca screenin2022, up to date         Impression/plan  Chronic HFpEF  Dry wt 244 lb  Home scale in the last week 244-246  Her lowest weight was 240 at the time of her last discharge  Office weight with clothes and shoes on  Wt Readings from Last 3 Encounters:   22 112 kg (246 lb 4 oz)   07/15/22 110 kg (242 lb)   07/15/22 112 kg (246 lb 9 6 oz)     --beta-blocker:   metoprolol tartrate 50 mg q 12  Will switch to metoprolol succinate 50 mg daily starting tomorrow    Hold metoprolol tartrate tonight dose  --Diuretic:   torsemide 40 mg in the morning 20 mg in the p m  since 7/15/22  --ACE/ARB/ARNI:    --MRA:   --SLGT2I  --ICD:   --2 g sodium diet, 1800 cc fluid restriction  Daily weights, call weight gain 2-3 lb in 1 day or 5 lb in 5 days  Paroxysmal atrial fibrillation  Rate controlled with metoprolol tartrate 50 mg q 12  On anticoagulation with Eliquis 5 mg b i d  ( weight of 111 kg, age 73years old)  · On metoprolol succinate 50 mg daily, decreased from tartrate 50 b i d  in order to diurese a few weeks ago  PSVT  Hypertension, essential  /60  On metoprolol succinate 50 mg daily, , loop diuretic  Hyperlipidemia  On rosuvastatin 10 mg daily 3/10/22  LDL 48, non HDL74  Type 2 diabetes mellitus on insulin; Hemoglobin A1c 6 8  CKD stage IIIB  New baseline1 7-1 9, previously 1 4-1 6  Followed by Nephrology  chronic respiratory failure with hypoxia and hypercapnia  KATRINA nocturnal O2 3 L supplemental oxygen,   Obesity hypoventilation syndrome  Obesity, BMI 44  History tobacco use, 20 pack years; quitting 25 years  Lymphedema  Compression socks  Cardiac testing   TTE 3/10/22 EF 53%  Wall motion normal   Borderline LVH  RV systolic function low normal   Atria normal size  No significant changes compared to 5/29/19   ZIo patch event recorder 5/12/22  Patient had a min HR of 58 bpm, max HR of 150 bpm, and avg HR of 75  bpm  Predominant underlying rhythm was Sinus Rhythm  32  Supraventricular Tachycardia runs occurred, the run with the fastest  interval lasting 11 3 secs with a max rate of 150 bpm, the longest lasting  26 4 secs with an avg rate of 111 bpm              HPI:   Fabien Hutchins is a 74 yo female with morbid obesity, chronic hypoxic and hypercapnic respiratory failure/OHS, type 2 diabetes mellitus, paroxysmal atrial fibrillation, PSVT, essential hypertension, mixed dyslipidemia, chronic heart failure preserved ejection fraction, chronic kidney disease stage IIIB  She follows with Dr Kraen Ramirez  She has been chronically anticoagulated with apixaban  Felt to have chronic lower extremity lymphedema  Multiple recent admissions:    03/01/2022 left shoulder replacement     Admitted 3/28-4/2/22   [facility]  leukocytosis, fever, suspected complicated UTI/sepsis  Elevated creatinine  Followed by Renal  Gently hydrated  Torsemide was held  Creatinine returned to baseline prior to discharge  AFib with RVR, decompensated heart failure   Diuresed  Discharge creatinine  Discharge diuretic: Torsemide 10 mg daily  Discharge weight: 266 lb     At her OP office visit with Cardiology she was in sinus rhythm    Adm 5/16-5/23/22  [facility]  AFib with RVR, lower extremity edema  Diuresed   Placed on CPAP    5/24/22 OV Dr Tess Catherine  [place] to be compliant with her diuretic, and watching her diet  Noncompliant with CPAP due to claustrophobia    ER admission 5/31/22    [facility]  Chief complaint:  10 lb weight gain, shortness of breath, chest pain  ProBNP 1859  Troponin 8  EKG showed normal sinus rhythm  Chest x-ray showing improving CHF  Patient evaluated at bedside by Dr Tess Catherine - recommended increasing torsemide dosage to 60 mg, repeat BNP as an outpatient in 5 days, continued outpatient follow-up  Adm 6/4-6/12/22  8d   Melany Navarro  CC: Worsening shortness of breath, weight gain , despite up titration of torsemide 60 mg daily for few days, as an outpatient  Diuresed with IV Bumex   Followed by Cardiology  Dx acute on chronic heart failure  Discharge weight :248 lb  Discharge creatinine: 1 93  Discharge diuretics: Torsemide 20 mg daily    7/6/22  Hospital follow-up  She is accompanied by her niece who helps her with her medical therapy  ROS:  In the last few days she has not been feeling well  She has been lightheaded, she is dizzy  She takes her blood pressure at home it has been low  She has had a weight gain of 3 lb since yesterday  She has lower extremity edema    She is drinking much less than her 64 oz a day that is recommended  Sometimes she holds the metoprolol because her blood pressure is too low  She sleeps in a hospital bed with the head elevated, since February hospital admission  Blood pressure in the office today 90/60  Heart rate 64 and regular  On exam, she is in decompensated heart failure; she has diminished breath sounds bilaterally, she has 2 to 3+ bilateral lower extremity edema  Today, I recommend we try to get her blood pressure up and then diurese her  I recommend she drink 64 oz of fluid a day  Hold metoprolol the rest of the day  Switch to metoprolol succinate 50 mg daily starting tomorrow  Continue to take torsemide 20 mg daily tomorrow, starting July 8th will increase this to 20 mg b i d  She can use her supplemental oxygen, during the day if needed  I emphasized a salt restricted diet  She will return to see me in 1-2 weeks, Dr Amelia Espino heart failure specialist new patient visit in about 9 days      Interval history  7/11 OV PCP    7/15 OV Pulm    7/15 Dr Amelia Espino  Weight up 245, 5 lb from prior  NAILS with mild activities  Compliant with her medical regimen  Torsemide increased to 40 mg the morning, 20 mg in the p m  from 20 b i d   BMP 1 week  Nuclear stress test when able to lie flat  She may benefit from CardioMEMS  Treatment of sleep apnea per pulmonology  Follow-up in [place] 1 week; Dr Amelia Espino 2 months      7/20/22  Close follow-up, heart failure  She is accompanied by her niece  ROS: Overall, she feels improved  Decrease lower extremity edema  Breathing has improved  Still with NAILS  Able to lie flat  Home weight 244 to 246  Dry weight 244  /60  Breath sounds are clear  She does have +1 lower extremity edema  Labs- will have drawn today  She related reluctance at the [address] [place] [address]  We discussed the benefit  I asked her to reconsider  She will discuss with Dr Amelia Espino  Recommended a stress test   The patient is agreeable    Will order today  If her weight increases 3 lb, I recommend increasing the torsemide to 40 mg b i d  for 3 days  She has not required potassium supplementation  Will assess BMP today      I have spent 40 minutes with Patient and family today in which greater than 50% of this time was spent in counseling/coordination of care regarding Patient and family education, Importance of tx compliance, Risk factor reductions and Impressions  Assessment  Diagnoses and all orders for this visit:    Chronic diastolic heart failure ([facility] )  -     NM myocardial perfusion spect (rx stress and/or rest); Future    Paroxysmal atrial fibrillation (HCC)    Mixed hyperlipidemia  -     NM myocardial perfusion spect (rx stress and/or rest); Future    Stage 3b chronic kidney disease ([facility] )    Essential hypertension  -     NM myocardial perfusion spect (rx stress and/or rest); Future    PSVT (paroxysmal supraventricular tachycardia) ([facility])    KATRINA (obstructive sleep apnea)    NAILS (dyspnea on exertion)        Past Medical History:   Diagnosis Date    Anemia     Asthma     COVID-19 January 2022    GERD (gastroesophageal reflux disease)     Hyperlipidemia     PONV (postoperative nausea and vomiting)     SVT (supraventricular tachycardia) ([facility])        Review of Systems   Constitutional: Negative for chills  Cardiovascular: Positive for dyspnea on exertion  Negative for chest pain, claudication, cyanosis, irregular heartbeat, leg swelling, near-syncope, orthopnea, palpitations, paroxysmal nocturnal dyspnea and syncope  Respiratory: Negative for cough  Gastrointestinal: Negative for heartburn and nausea  Neurological: Negative for dizziness, focal weakness, headaches, light-headedness and weakness  All other systems reviewed and are negative        Allergies   Allergen Reactions    Penicillins Hives    Moxifloxacin Other (See Comments)     unknown    Percocet [Oxycodone-Acetaminophen] Other (See Comments)     unknown    Zinc Acetate Other (See Comments)     unknown    Asa [Aspirin] GI Intolerance  Indocin [Indomethacin] Other (See Comments)     Made patient "loopy"    Other Other (See Comments)     unknown       Current Outpatient Medications:     acetaminophen (TYLENOL) 325 mg tablet, Take 650 mg by mouth every 6 (six) hours as needed for mild pain  , Disp: , Rfl:     albuterol (PROVENTIL HFA,VENTOLIN HFA) 90 mcg/act inhaler, Inhale 2 puffs every 6 (six) hours as needed for wheezing, Disp: 8 g, Rfl: 1    ammonium lactate (LAC-HYDRIN) 12 % lotion, APPLY TOPICALLY TO AFFECTED AREAS twice a day, Disp: , Rfl:     apixaban (ELIQUIS) 5 mg, Take 1 tablet (5 mg total) by mouth in the morning and 1 tablet (5 mg total) in the evening , Disp: 180 tablet, Rfl: 1    b complex-vitamin C-folic acid (NEPHROCAPS) 1 mg capsule, Take 1 capsule by mouth daily, Disp: 30 capsule, Rfl: 5    docusate sodium (COLACE) 100 mg capsule, Take 100 mg by mouth in the morning, Disp: , Rfl:     glucose blood (OneTouch Ultra) test strip, Test twice daily  , Disp: 100 strip, Rfl: 2    insulin lispro (HumaLOG KwikPen) 100 units/mL injection pen, 3 times with meal according to sliding scale - >Blood Glucose 150 - 199: 2 Unit of Insulin, Blood Glucose 200 - 249: 4 Units of Insulin, Blood Glucose 250 - 299: 6 Units of Insulin, Blood Glucose 300 - 349: 8 Units of Insulin, Blood Glucose 350 - 399: 10 Units of Inuslin,Blood Glucose greater than or equal to 400: 12 Units of Insulin-please contact your physician, Disp: 15 mL, Rfl: 0    Insulin Pen Needle (BD Pen Needle Pilar 2nd Gen) 32G X 4 MM MISC, For use with insulin pen   Pharmacy may dispense brand covered by insurance , Disp: 100 each, Rfl: 0    Insulin Pen Needle (NovoFine Autocover) 30G X 8 MM MISC, Inject under the skin daily, Disp: 100 each, Rfl: 3    Insulin Pen Needle 30G X 8 MM MISC, 2 (two) times a day, Disp: , Rfl:     Lancets (onetouch ultrasoft) lancets, Use once daily, Disp: 100 each, Rfl: 2    metoprolol succinate (TOPROL-XL) 50 mg 24 hr tablet, Take 1 tablet (50 mg total) by mouth daily, Disp: 30 tablet, Rfl: 3    montelukast (SINGULAIR) 10 mg tablet, Take 10 mg by mouth daily  , Disp: , Rfl:     nystatin (MYCOSTATIN) cream, Apply topically 2 (two) times a day, Disp: 30 g, Rfl: 3    pregabalin (LYRICA) 100 mg capsule, Take 1 capsule (100 mg total) by mouth 2 (two) times a day, Disp: 180 capsule, Rfl: 1    rosuvastatin (CRESTOR) 10 MG tablet, 10 mg daily  , Disp: , Rfl: 0    sodium chloride (OCEAN) 0 65 % nasal spray, 1 spray into each nostril every hour as needed for congestion, Disp: , Rfl: 0    torsemide (DEMADEX) 20 mg tablet, Take 2 tablets in the AM and 1 tablet in PM, Disp: 90 tablet, Rfl: 1    Toujeo SoloStar 300 units/mL CONCENTRATED U-300 injection pen (1-unit dial), Inject 25 Units under the skin daily at bedtime, Disp: 4 5 mL, Rfl: 0    Trulicity 1 5 QO/4 6KS SOPN, inject 0 5 milliliter subcutaneously every week 28, Disp: , Rfl:     Social History     Socioeconomic History    Marital status: Single     Spouse name: Not on file    Number of children: Not on file    Years of education: Not on file    Highest education level: Not on file   Occupational History    Not on file   Tobacco Use    Smoking status: Former Smoker     Packs/day: 1 00     Years: 20 00     Pack years: 20 00     Types: Cigarettes     Quit date: 1996     Years since quittin 0    Smokeless tobacco: Never Used   Vaping Use    Vaping Use: Never used   Substance and Sexual Activity    Alcohol use: Not Currently     Comment: rarely    Drug use: No    Sexual activity: Not Currently   Other Topics Concern    Not on file   Social History Narrative    Not on file     Social Determinants of Health     Financial Resource Strain: Not on file   Food Insecurity: No Food Insecurity    Worried About Running Out of Food in the Last Year: Never true    Wes of Food in the Last Year: Never true   Transportation Needs: No Transportation Needs    Lack of Transportation (Medical): No    Lack of Transportation (Non-Medical): No   Physical Activity: Not on file   Stress: Not on file   Social Connections: Not on file   Intimate Partner Violence: Not on file   Housing Stability: Low Risk     Unable to Pay for Housing in the Last Year: No    Number of Places Lived in the Last Year: 2    Unstable Housing in the Last Year: No       Family History   Problem Relation Age of Onset    Heart disease Mother     Emphysema Maternal Grandmother     Heart disease Family        Physical Exam  Vitals and nursing note reviewed  Constitutional:       General: She is not in acute distress  Appearance: She is obese  She is not diaphoretic  HENT:      Head: Normocephalic and atraumatic  Eyes:      Conjunctiva/sclera: Conjunctivae normal    Cardiovascular:      Rate and Rhythm: Normal rate and regular rhythm  Pulses: Intact distal pulses  Heart sounds: Normal heart sounds  Pulmonary:      Effort: Pulmonary effort is normal       Breath sounds: No rales  Abdominal:      General: Bowel sounds are normal       Palpations: Abdomen is soft  Musculoskeletal:         General: Normal range of motion  Cervical back: Normal range of motion and neck supple  Right lower leg: Edema present  Left lower leg: Edema present  Skin:     General: Skin is warm and dry  Neurological:      Mental Status: She is alert and oriented to person, place, and time  Vitals: Blood pressure 122/60, pulse 68, resp  rate 18, height 5' 2" (1 575 m), weight 112 kg (246 lb 4 oz), SpO2 100 %, not currently breastfeeding     Wt Readings from Last 3 Encounters:   07/20/22 112 kg (246 lb 4 oz)   07/15/22 110 kg (242 lb)   07/15/22 112 kg (246 lb 9 6 oz)         Labs & Results:  Lab Results   Component Value Date    WBC 7 86 07/11/2022    HGB 11 3 (L) 07/11/2022    HCT 37 0 07/11/2022    MCV 90 07/11/2022     (L) 07/11/2022     BNP   Date Value Ref Range Status   06/04/2022 74 0 - 100 pg/mL Final     No components found for: CHEM    Results for orders placed during the hospital encounter of 19    Echo complete with contrast if indicated    Narrative  [address]  [address] [facility]  [place] [phone]    Transthoracic Echocardiogram  2D, M-mode, Doppler, and Color Doppler    Study date:  29-May-2019    Patient: Martinez Adame  MR number: [id number]  Account number: [de-identified]  : 1950  Age: 79 years  Gender: Female  Status: Inpatient  Location: Bedside  Height: 62 in  Weight: 250 6 lb  BP: 133/ 62 mmHg    Indications: Tachycardia    Diagnoses: I11 9 - Hypertensive heart disease without heart failure    Sonographer:  QUETA Almeida  Referring Physician:  Isabelle Butler MD  Group:  [facility]  Interpreting Physician:  Duane Curry DO    SUMMARY    LEFT VENTRICLE:  Systolic function was normal by visual assessment  Ejection fraction was estimated to be 55 %  There were no regional wall motion abnormalities  Wall thickness was mildly to moderately increased  Doppler parameters were consistent with abnormal left ventricular relaxation (grade 1 diastolic dysfunction)  MITRAL VALVE:  There was mild regurgitation  AORTIC VALVE:  There was no evidence for stenosis  There was no regurgitation  TRICUSPID VALVE:  There was mild regurgitation  Pulmonary artery systolic pressure was within the normal range  HISTORY: PRIOR HISTORY: Diabetes,Diabetes, HTN, Hyperlipidemia, A Fib  PROCEDURE: The procedure was performed at the bedside  This was a routine study  The transthoracic approach was used  The study included complete 2D imaging, M-mode, complete spectral Doppler, and color Doppler  The heart rate was 77 bpm,  at the start of the study  Images were obtained from the parasternal, apical, subcostal, and suprasternal notch acoustic windows   Echocardiographic views were limited due to restricted patient mobility, poor patient compliance, poor  acoustic window availability, decreased penetration, and lung interference  This was a technically difficult study  LEFT VENTRICLE: Size was normal  Systolic function was normal by visual assessment  Ejection fraction was estimated to be 55 %  There were no regional wall motion abnormalities  Wall thickness was mildly to moderately increased  DOPPLER:  Doppler parameters were consistent with abnormal left ventricular relaxation (grade 1 diastolic dysfunction)  RIGHT VENTRICLE: The size was normal  Systolic function was normal     LEFT ATRIUM: The atrium was mildly dilated  RIGHT ATRIUM: Size was normal     MITRAL VALVE: Valve structure was normal  There was normal leaflet separation  No echocardiographic evidence for prolapse  DOPPLER: The transmitral velocity was within the normal range  There was no evidence for stenosis  There was mild  regurgitation  AORTIC VALVE: The valve was trileaflet  Leaflets exhibited normal thickness, normal cuspal separation, and sclerosis  DOPPLER: Transaortic velocity was within the normal range  There was no evidence for stenosis  There was no  regurgitation  TRICUSPID VALVE: The valve structure was normal  There was normal leaflet separation  DOPPLER: The transtricuspid velocity was within the normal range  There was mild regurgitation  Pulmonary artery systolic pressure was within the normal  range  Estimated peak PA pressure was 32 mmHg  PULMONIC VALVE: Leaflets exhibited normal thickness, no calcification, and normal cuspal separation  DOPPLER: The transpulmonic velocity was within the normal range  There was no regurgitation  PERICARDIUM: There was no thickening  There was no pericardial effusion  AORTA: The root exhibited normal size      PULMONARY ARTERY: The size was normal  The morphology appeared normal     SYSTEM MEASUREMENT TABLES    2D mode  AoR Diam 2D: 3 6 cm  LA Diam (2D): 3 9 cm  LA/Ao (2D): 1 08  FS (2D Teich): 26 9 %  IVSd (2D): 1 29 cm  LVDEV: 75 1 cmï¾³  LVESV: 35 3 cmï¾³  LVIDd(2D): 4 12 cm  LVISd (2D): 3 01 cm  LVOT Area 2D: 3 14 cmï¾²  LVPWd (2D): 1 2 cm  SV (Teich): 39 8 cmï¾³    Apical four chamber  LVEF A4C: 51 %    Unspecified Scan Mode  HANNA Cont Eq (Peak Gutierrez): 2 58 cmï¾²  LVOT Diam : 2 cm  LVOT Vmax: 963 mm/s  LVOT Vmax; Mean: 963 mm/s  Peak Grad ; Mean: 4 mm[Hg]  MV Peak A Gutierrez: 893 mm/s  MV Peak E Gutierrez  Mean: 805 mm/s  MVA (PHT): 3 67 cmï¾²  PHT: 60 ms  Max P mm[Hg]  V Max: 2360 mm/s  Vmax: 2430 mm/s  RA Area: 12 8 cmï¾²  RA Volume: 27 6 cmï¾³  TAPSE: 2 cm    [employer] Accredited Echocardi[facility]    Prepared and electronically signed by    Nazario Jackman DO  Signed 29-May-2019 16:19:07    No results found for this or any previous visit  This note was completed in part utilizing m-[employer] fluency direct voice recognition software  Grammatical errors, random word insertion, spelling mistakes, and incomplete sentences may be an occasional consequence of the system secondary to software limitations, ambient noise and hardware issues  At the time of dictation, efforts were made to edit, clarify and /or correct errors  Please read the chart carefully and recognize, using context, where substitutions have occurred    If you have any questions or concerns about the context, text or information contained within the body of this dictation, please contact myself, the provider, for further clarification

## 2022-07-21 DIAGNOSIS — Z79.4 TYPE 2 DIABETES MELLITUS WITH HYPEROSMOLARITY WITHOUT COMA, WITH LONG-TERM CURRENT USE OF INSULIN (HCC): Primary | ICD-10-CM

## 2022-07-21 DIAGNOSIS — E11.00 TYPE 2 DIABETES MELLITUS WITH HYPEROSMOLARITY WITHOUT COMA, WITH LONG-TERM CURRENT USE OF INSULIN (HCC): Primary | ICD-10-CM

## 2022-07-22 ENCOUNTER — TELEPHONE (OUTPATIENT)
Dept: NEPHROLOGY | Facility: CLINIC | Age: 72
End: 2022-07-22

## 2022-07-22 NOTE — TELEPHONE ENCOUNTER
Spoke with patient about the following, she expressed understanding and thanked us for the call:    ----- Message from Natan sent at 7/22/2022 10:54 AM EDT -----  Please call Shelley Ruddr and let her know that I reviewed blood work results  Renal function is stable at this time  Vitamin-D level is a little bit low therefore she should be taking vitamin D3 2000 units daily  She can purchase this over-the-counter  Thank you  No other changes

## 2022-07-25 DIAGNOSIS — E78.2 MIXED HYPERLIPIDEMIA: Primary | ICD-10-CM

## 2022-07-25 DIAGNOSIS — E11.00 TYPE 2 DIABETES MELLITUS WITH HYPEROSMOLARITY WITHOUT COMA, WITHOUT LONG-TERM CURRENT USE OF INSULIN (HCC): ICD-10-CM

## 2022-07-25 RX ORDER — ROSUVASTATIN CALCIUM 10 MG/1
10 TABLET, COATED ORAL DAILY
Qty: 90 TABLET | Refills: 1 | Status: SHIPPED | OUTPATIENT
Start: 2022-07-25

## 2022-07-27 LAB
DME PARACHUTE DELIVERY DATE ACTUAL: NORMAL
DME PARACHUTE DELIVERY DATE EXPECTED: NORMAL
DME PARACHUTE DELIVERY DATE REQUESTED: NORMAL
DME PARACHUTE ITEM DESCRIPTION: NORMAL
DME PARACHUTE ORDER STATUS: NORMAL
DME PARACHUTE SUPPLIER NAME: NORMAL
DME PARACHUTE SUPPLIER PHONE: NORMAL

## 2022-07-28 ENCOUNTER — PATIENT OUTREACH (OUTPATIENT)
Dept: INTERNAL MEDICINE CLINIC | Facility: CLINIC | Age: 72
End: 2022-07-28

## 2022-07-28 DIAGNOSIS — J45.909 MILD ASTHMA WITHOUT COMPLICATION, UNSPECIFIED WHETHER PERSISTENT: ICD-10-CM

## 2022-07-28 RX ORDER — ALBUTEROL SULFATE 90 UG/1
2 AEROSOL, METERED RESPIRATORY (INHALATION) EVERY 6 HOURS PRN
Qty: 8 G | Refills: 4 | Status: SHIPPED | OUTPATIENT
Start: 2022-07-28

## 2022-07-28 NOTE — PROGRESS NOTES
Bundle Episode CHF  6/4/22-9/10/22    [facility] RN reviewed chart  I called patient for follow up and introduced self and reviewed medical needs  She states she is feeling very well at this time  She has made all her f/u appointments  She says she has been compliant with her diet and taking all medications as instructed  She says she has been weighing herself every day  Her weight this morning was 244 lbs  She denies gaining any weight  Patient states she has no issues obtaining or affording her medications and has transportation to all appointments  Patient states she has VNA through [employer] [facility] with only nursing once a week  Patient says she needs a refill called in for her Proventil inhaler  An IB message was sent to Dr Enoc LOTT to follow

## 2022-07-29 DIAGNOSIS — E11.00 TYPE 2 DIABETES MELLITUS WITH HYPEROSMOLARITY WITHOUT COMA, WITHOUT LONG-TERM CURRENT USE OF INSULIN (HCC): ICD-10-CM

## 2022-07-29 RX ORDER — INSULIN GLARGINE 300 U/ML
25 INJECTION, SOLUTION SUBCUTANEOUS
Qty: 4.5 ML | Refills: 0 | Status: SHIPPED | OUTPATIENT
Start: 2022-07-29 | End: 2022-08-19

## 2022-08-01 ENCOUNTER — RA CDI HCC (OUTPATIENT)
Dept: OTHER | Facility: HOSPITAL | Age: 72
End: 2022-08-01

## 2022-08-01 NOTE — PROGRESS NOTES
E11 22, I13 0  [facility]  coding opportunities          Chart Reviewed number of suggestions sent to Provider: 2     Patients Insurance     Medicare Insurance: [employer]

## 2022-08-12 ENCOUNTER — RA CDI HCC (OUTPATIENT)
Dept: OTHER | Facility: HOSPITAL | Age: 72
End: 2022-08-12

## 2022-08-12 NOTE — PROGRESS NOTES
E11 22, I13 0 previous suggestions  [facility] 75  coding opportunities       Chart reviewed, no opportunity found: CHART REVIEWED, NO OPPORTUNITY FOUND        Patients Insurance     Medicare Insurance: Medicare

## 2022-08-19 ENCOUNTER — OFFICE VISIT (OUTPATIENT)
Dept: INTERNAL MEDICINE CLINIC | Facility: CLINIC | Age: 72
End: 2022-08-19
Payer: MEDICARE

## 2022-08-19 DIAGNOSIS — J96.21 ACUTE ON CHRONIC RESPIRATORY FAILURE WITH HYPOXIA AND HYPERCAPNIA (HCC): Primary | ICD-10-CM

## 2022-08-19 DIAGNOSIS — J45.909 MILD ASTHMA WITHOUT COMPLICATION, UNSPECIFIED WHETHER PERSISTENT: ICD-10-CM

## 2022-08-19 DIAGNOSIS — I73.00 RAYNAUD'S DISEASE WITHOUT GANGRENE: ICD-10-CM

## 2022-08-19 DIAGNOSIS — D64.9 ANEMIA, UNSPECIFIED TYPE: ICD-10-CM

## 2022-08-19 DIAGNOSIS — J96.22 ACUTE ON CHRONIC RESPIRATORY FAILURE WITH HYPOXIA AND HYPERCAPNIA (HCC): Primary | ICD-10-CM

## 2022-08-19 DIAGNOSIS — IMO0001 LUNG NODULE < 6CM ON CT: ICD-10-CM

## 2022-08-19 DIAGNOSIS — E66.01 MORBID OBESITY (HCC): ICD-10-CM

## 2022-08-19 DIAGNOSIS — J96.12 CHRONIC RESPIRATORY FAILURE WITH HYPOXIA AND HYPERCAPNIA (HCC): Chronic | ICD-10-CM

## 2022-08-19 DIAGNOSIS — L89.156 PRESSURE INJURY OF DEEP TISSUE OF SACRAL REGION: ICD-10-CM

## 2022-08-19 DIAGNOSIS — I50.33 ACUTE ON CHRONIC HEART FAILURE WITH PRESERVED EJECTION FRACTION (HCC): ICD-10-CM

## 2022-08-19 DIAGNOSIS — I50.32 CHRONIC DIASTOLIC HEART FAILURE (HCC): ICD-10-CM

## 2022-08-19 DIAGNOSIS — E11.00 TYPE 2 DIABETES MELLITUS WITH HYPEROSMOLARITY WITHOUT COMA, WITHOUT LONG-TERM CURRENT USE OF INSULIN (HCC): ICD-10-CM

## 2022-08-19 DIAGNOSIS — I10 ESSENTIAL HYPERTENSION: ICD-10-CM

## 2022-08-19 DIAGNOSIS — I80.8 THROMBOPHLEBITIS OF ARM, RIGHT: ICD-10-CM

## 2022-08-19 DIAGNOSIS — R21 RASH: ICD-10-CM

## 2022-08-19 DIAGNOSIS — E78.2 MIXED HYPERLIPIDEMIA: ICD-10-CM

## 2022-08-19 DIAGNOSIS — S42.295D OTHER CLOSED NONDISPLACED FRACTURE OF PROXIMAL END OF LEFT HUMERUS WITH ROUTINE HEALING, SUBSEQUENT ENCOUNTER: ICD-10-CM

## 2022-08-19 DIAGNOSIS — N18.32 STAGE 3B CHRONIC KIDNEY DISEASE (HCC): ICD-10-CM

## 2022-08-19 DIAGNOSIS — J96.11 CHRONIC RESPIRATORY FAILURE WITH HYPOXIA AND HYPERCAPNIA (HCC): Chronic | ICD-10-CM

## 2022-08-19 DIAGNOSIS — I48.0 PAROXYSMAL ATRIAL FIBRILLATION (HCC): ICD-10-CM

## 2022-08-19 PROBLEM — N18.31 STAGE 3A CHRONIC KIDNEY DISEASE (HCC): Status: ACTIVE | Noted: 2022-08-19

## 2022-08-19 PROCEDURE — 99215 OFFICE O/P EST HI 40 MIN: CPT | Performed by: INTERNAL MEDICINE

## 2022-08-19 RX ORDER — CLOTRIMAZOLE AND BETAMETHASONE DIPROPIONATE 10; .64 MG/G; MG/G
CREAM TOPICAL 2 TIMES DAILY
Qty: 45 G | Refills: 2 | Status: SHIPPED | OUTPATIENT
Start: 2022-08-19

## 2022-08-19 RX ORDER — TORSEMIDE 20 MG/1
TABLET ORAL
Qty: 90 TABLET | Refills: 1 | Status: SHIPPED | OUTPATIENT
Start: 2022-08-19 | End: 2022-09-09

## 2022-08-19 RX ORDER — INSULIN GLARGINE 300 U/ML
35 INJECTION, SOLUTION SUBCUTANEOUS
Qty: 4.5 ML | Refills: 3 | Status: SHIPPED | OUTPATIENT
Start: 2022-08-19 | End: 2022-09-21

## 2022-08-19 RX ORDER — MOMETASONE FUROATE 1 MG/G
CREAM TOPICAL DAILY
Qty: 45 G | Refills: 0 | Status: SHIPPED | OUTPATIENT
Start: 2022-08-19

## 2022-08-19 NOTE — PATIENT INSTRUCTIONS
1  In both hands dry the scaly rash with dry cracks  Will recommend moisturizing cream twice a day as well as  Elcon  cream at nighttime both hands    2  Fungus Cream: clean with soap and water daily and keep it dry  Stop Nystatin  Lotrisone cream twice a day under breast twice a day    3  Rash on left fore arm 2 inches  Reddish flat  Apply elcon lotion daily    Test for Lyme's titre    Go for CT scan of the chest for 3 month surveillance for follow-up of your lung nodules  Increase your to Tujeo  units and take 35 units  Paragraphs check your sugar 3 times a day and write it down and bring with you every time    Please do not take any metformin any time  Please do not take any anti-inflammatory ibuprofen Aleve etc   Any time  Basic Carbohydrate Counting   AMBULATORY CARE:   Carbohydrate counting  is a way to plan your meals by counting the amount of carbohydrate in foods  Carbohydrates are the sugars, starches, and fiber found in fruit, grains, vegetables, and milk products  Carbohydrates increase your blood sugar levels  Carbohydrate counting can help you eat the right amount of carbohydrate to keep your blood sugar levels under control  What you need to know about planning meals using carbohydrate counting:  A dietitian or healthcare provider will help you develop a healthy meal plan that works best for you  You will be taught how much carbohydrate to eat or drink for each meal and snack  Your meal plan will be based on your age, weight, usual food intake, and physical activity level  If you have diabetes, it will also include your blood sugar levels and diabetes medicine  Once you know how much carbohydrate you should eat, you can decide what type of food you want to eat  You will need to know what foods contain carbohydrate and how much they contain  Keep track of the amount of carbohydrate in meals and snacks in order to follow your meal plan  Do not avoid carbohydrates or skip meals   Your blood sugar may fall too low if you do not eat enough carbohydrate or you skip meals  Foods that contain carbohydrate:   Breads:  Each serving of food listed below contains about 15 g of carbohydrate   1 slice of bread (1 ounce) or 1 flour or corn tortilla (6 inch)    ½ of a hamburger bun or ¼ of a large bagel (about 1 ounce)    1 pancake (about 4 inches across and ¼ inch thick)    Cereals and grains:  Serving sizes of ready-to-eat cereals vary  Look at the serving size and the total carbohydrate amount listed on the food label  Each serving of food listed below contains about 15 g of carbohydrate   ¾ cup of dry, unsweetened, ready-to-eat cereal or ¼ cup of low-fat granola     ½ cup of oatmeal or other cooked cereal     ? cup of cooked rice or pasta    Starchy vegetables and beans:  Each serving of food listed below contains about 15 g of carbohydrate   ½ cup of corn, green peas, sweet potatoes, or mashed potatoes    ¼ of a large baked potato    ½ cup of beans, lentils, and peas (garbanzo, vazquez, kidney, white, split, black-eyed)    Crackers and snacks:  Each serving of food listed below contains about 15 g of carbohydrate   3 juancho cracker squares or 8 animal crackers     6 saltine-type crackers    3 cups of popcorn or ¾ ounce of pretzels, potato chips, or tortilla chips    Fruit:  Each serving of food listed below contains about 15 g of carbohydrate   1 small (4 ounce) piece of fresh fruit or ¾ to 1 cup of fresh fruit    ½ cup of canned or frozen fruit, packed in natural juice    ½ cup (4 ounces) of unsweetened fruit juice    2 tablespoons of dried fruit    Desserts or sugary foods:  Each serving of food listed below contains about 15 g of carbohydrate       2-inch square unfrosted cake or brownie     2 small cookies    ½ cup of ice cream, frozen yogurt, or nondairy frozen yogurt    ¼ cup of sherbet or sorbet    1 tablespoon of regular syrup, jam, or jelly    2 tablespoons of light syrup    Milk and yogurt:  Foods from the milk group contain about 12 g of carbohydrate per serving  1 cup of fat-free or low-fat milk    1 cup of soy milk    ? cup of fat-free, yogurt sweetened with artificial sweetener    Non-starchy vegetables:  Each serving contains about 5 g of carbohydrate   Three servings of non-starch vegetables count as 1 carbohydrate serving  ½ cup of cooked vegetables or 1 cup of raw vegetables  This includes beets, broccoli, cabbage, cauliflower, cucumber, mushrooms, tomatoes, and zucchini    ½ cup of vegetable juice    How to use carbohydrate counting to plan meals:   Count carbohydrate amounts using serving sizes:      Pasta dinner example: You plan to have pasta, tossed salad, and an 8-ounce glass of milk  Your healthcare provider tells you that you may have 4 carbohydrate servings for dinner  One carbohydrate serving of pasta is ? cup  One cup of pasta will equal 3 carbohydrate servings  An 8-ounce glass of milk will count as 1 carbohydrate serving  These amounts of food would equal 4 carbohydrate servings  One cup of tossed salad does not count toward your carbohydrate servings  Count carbohydrate amounts using food labels:  Find the total amount of carbohydrate in a packaged food by reading the food label  Food labels tell you the serving size of the food and the total carbohydrate amount in each serving  Find the serving size on the food label and then decide how many servings you will eat  Multiply the number of servings you plan to eat by the carbohydrate amount per serving  Granola bar snack example: Your meal plan allows you to have 2 carbohydrate servings (30 grams) of carbohydrate for a snack  You plan to eat 1 package of granola bars, which contains 2 bars  According to the food label, the serving size of food in this package is 1 bar  Each serving (1 bar) contains 25 grams of carbohydrate  The total amount of carbohydrate in this package of granola bars would be 50 g  Based on your meal plan, you should eat only 1 bar  Follow up with your doctor as directed:  Write down your questions so you remember to ask them during your visits  © Copyright [employer] 2022 Information is for End User's use only and may not be sold, redistributed or otherwise used for commercial purposes  All illustrations and images included in CareNotes® are the copyrighted property of [employer]  or [facility] Hemanth Trujillo   The above information is an  only  It is not intended as medical advice for individual conditions or treatments  Talk to your doctor, nurse or pharmacist before following any medical regimen to see if it is safe and effective for you

## 2022-08-19 NOTE — ASSESSMENT & PLAN NOTE
Patient was seen in month of July 15, 2022 by pulmonologist   They did not make arrangement for follow-up CT scan as advised on the CT scan of the 72,022  Will go ahead and sh set up another appointment for close monitoring  Patient was smoker more than 20 years ago  Has not been smoking anymore  Pulmonary status is fair  07/08/2022:  CT scan of the chest:   Nodules as described above  There is interval enlargement of one of the groundglass nodules at the right lower lobe as well as interval development of several groundglass nodules  These are likely of infectious or inflammatory etiology  Recommend   follow-up in 3 months to document stability or interval change        03/29/2022:  CT scan of the chest abdomen and pelvis:  LUNGS:  Passive atelectasis in the right lower lobe and minimal patchy dependent atelectasis in the left lower lobe  8 x 6 mm nodule in the right lower lobe laterally on series 3, image 46 is not significantly changed since 3/17/2022  Continued   short-term follow-up is advised in 3 months  Stable 4 mm pulmonary nodule in the right upper lobe apex on series 3, image 14  Stable 4 mm nodule in the right middle lobe on series 3, image 38  Patchy nodular groundglass densities have significantly   decreased when compared with the prior study  There is a 5 mm nodule in the right lower lobe on series 3, image 53 previously obscured by patchy airspace consolidation  5 mm pulmonary nodule in the superior segment of the left lower lobe on series 3,   image 39 appears stable  No obvious endobronchial lesion      Decreased bilateral pleural effusions and pulmonary edema as described above with a residual small right pleural effusion remaining  Passive atelectasis in the lower lobes      Multiple pulmonary nodules as described above   Short-term follow-up chest CT study is advised in 3 months      No hydronephrosis or obstructive uropathy      No bowel obstruction or focal inflammatory process      Stable additional findings as above

## 2022-08-19 NOTE — PROGRESS NOTES
[facility] Office Visit Note  22     Andree Padron 81 y o  female MRN: [id number]  : 1950    Assessment:     1  Acute on chronic respiratory failure with hypoxia and hypercapnia (HCC)  Assessment & Plan:  Acute component resolved  Currently on oxygen nasal 2 L at nighttime  Uses CPAP 2-3 hours in the afternoon  Under care of pulmonologist            2  Paroxysmal atrial fibrillation (HCC)  Assessment & Plan:  Metoprolol were changed to metoprolol succinate 50 mg daily as well as patient remains on Eliquis 5 mg twice a day  Rate is controlled  Denies any palpitation dizziness  No syncope  LVF compensated  Orders:  -     Comprehensive metabolic panel; Future; Expected date: 10/20/2022    3  Chronic respiratory failure with hypoxia and hypercapnia (HCC)  Assessment & Plan:  Acute component resolved  Currently on oxygen nasal 2 L at nighttime  Uses CPAP 2-3 hours in the afternoon  Under care    Orders:  -     CT chest wo contrast; Future; Expected date: 10/17/2022    4  Chronic diastolic heart failure (HCC)  Assessment & Plan:  Wt Readings from Last 3 Encounters:   22 112 kg (248 lb)   22 112 kg (246 lb 4 oz)   07/15/22 110 kg (242 lb)     Echo:   EF:  Nuclear Stress test:   Dry weight 242-248 lb  --beta-blocker:   metoprolol tartrate 50 mg q 12    --Diuretic:   torsemide 40 mg in the morning 20 mg in the p m  since 7/15/22  --ACE/ARB/ARNI:    --MRA:   --SLGT2I  --ICD:   --2 g sodium diet, 1800 cc fluid restriction   Daily weights, call weight gain 2-3 lb in 1 day or 5 lb in 5 days    Cardiac work up:      Echocardiogram 3/10/22  LVEF: 53%  LVIDd: normal cavity size  RV: normal size and systolic function  MR: none  PASP: inadequate TR envelope for estimation  RVOT: no notching, more parabolic  Other: unable to assess diastolic function, mildly increased wall thickness     Pharm Nuc Stress 2019:  There was a small, mildly severe, fixed myocardial perfusion defect of the apical apical and anterior wall likely due to attenuation from breast tissue  Echo 5/29/22: LVEF 55%  LVIDD 4 1cm  Mild to moderately increased wall thickness  Grade 1 diastology  Mild MR  Normal RV size and systolic     Stress test : No ischemia  8-:   Stress ECG: No ST deviation is noted  The ECG was not diagnostic due to pharmacological (vasodilator) stress    Perfusion: There is a left ventricular perfusion defect that is small in size with mild reduction in uptake present in the anterior and apex location(s) that is fixed  The defect appears to be an artifact caused by breast attenuation    Stress Function: Left ventricular function post-stress is normal  Post-stress ejection fraction is 70 %      No ischemia seen  Orders:  -     Comprehensive metabolic panel; Future; Expected date: 10/20/2022  -     Hemoglobin A1C; Future; Expected date: 10/20/2022  -     Lipid panel; Future; Expected date: 10/20/2022    5  Raynaud's disease without gangrene  Assessment & Plan:  fair      6  Mild asthma without complication, unspecified whether persistent    7  Acute on chronic heart failure with preserved ejection fraction (HCC)  -     torsemide (DEMADEX) 20 mg tablet; Take 2 tablets in the AM and 1 tablet in PM    8  Type 2 diabetes mellitus with hyperosmolarity without coma, without long-term current use of insulin ([facility])  Assessment & Plan:    Lab Results   Component Value Date    HGBA1C 9 0 (H) 07/20/2022   increase Toujeo 35 units daily  Continue trulicity same  BS was runnying high  Diet advised  BMI 45  No hypoglycemia  rec to keep track of home sugar three times a day and bring diary    Orders:  -     Toujeo SoloStar 300 units/mL CONCENTRATED U-300 injection pen (1-unit dial); Inject 35 Units under the skin daily at bedtime  -     Comprehensive metabolic panel; Future; Expected date: 10/20/2022  -     Hemoglobin A1C; Future; Expected date: 10/20/2022  -     Lipid panel;  Future; Expected date: 10/20/2022    9  Thrombophlebitis of arm, right  Assessment & Plan:  resolved      10  Other closed nondisplaced fracture of proximal end of left humerus with routine healing, subsequent encounter  Assessment & Plan:  Resolved    Not going for therapy      11  Stage 3b chronic kidney disease [facility])  Assessment & Plan:  Lab Results   Component Value Date    EGFR 30 07/20/2022    EGFR 30 07/11/2022    EGFR 32 06/16/2022    CREATININE 1 69 (H) 07/20/2022    CREATININE 1 69 (H) 07/11/2022    CREATININE 1 62 (H) 06/16/2022   BUN 61      Orders:  -     Comprehensive metabolic panel; Future; Expected date: 10/20/2022    12  Mixed hyperlipidemia  Assessment & Plan:  Lab Results   Component Value Date    CHOLESTEROL 141 07/20/2022    CHOLESTEROL 104 03/10/2022     Lab Results   Component Value Date    HDL 35 (L) 07/20/2022    HDL 30 (L) 03/10/2022     Lab Results   Component Value Date    TRIG 376 (H) 07/20/2022    TRIG 132 03/10/2022     Lab Results   Component Value Date    NONHDLC 106 07/20/2022       Alt wnl    Continue crestor 10 mg daily and     Orders:  -     Comprehensive metabolic panel; Future; Expected date: 10/20/2022  -     Hemoglobin A1C; Future; Expected date: 10/20/2022  -     Lipid panel; Future; Expected date: 10/20/2022    13  Anemia, unspecified type  Assessment & Plan:  Anemia is better  Hemoglobin 11 8  Will continue to monitor last time it was 10 5    Lab Results   Component Value Date    WBC 7 02 07/20/2022    HGB 11 8 07/20/2022    HCT 38 4 07/20/2022    MCV 90 07/20/2022     07/20/2022           14  Morbid obesity ([address] )    15  Pressure injury of deep tissue of sacral region  Assessment & Plan:  Healed per patient      16  Lung nodule < 6cm on CT  Assessment & Plan:  Patient was seen in month of July 15, 2022 by pulmonologist   They did not make arrangement for follow-up CT scan as advised on the CT scan of the 72,022    Will go ahead and sh set up another appointment for close monitoring  Patient was smoker more than 20 years ago  Has not been smoking anymore  Pulmonary status is fair  07/08/2022:  CT scan of the chest:   Nodules as described above  There is interval enlargement of one of the groundglass nodules at the right lower lobe as well as interval development of several groundglass nodules  These are likely of infectious or inflammatory etiology  Recommend   follow-up in 3 months to document stability or interval change        03/29/2022:  CT scan of the chest abdomen and pelvis:  LUNGS:  Passive atelectasis in the right lower lobe and minimal patchy dependent atelectasis in the left lower lobe  8 x 6 mm nodule in the right lower lobe laterally on series 3, image 46 is not significantly changed since 3/17/2022  Continued   short-term follow-up is advised in 3 months  Stable 4 mm pulmonary nodule in the right upper lobe apex on series 3, image 14  Stable 4 mm nodule in the right middle lobe on series 3, image 38  Patchy nodular groundglass densities have significantly   decreased when compared with the prior study  There is a 5 mm nodule in the right lower lobe on series 3, image 53 previously obscured by patchy airspace consolidation  5 mm pulmonary nodule in the superior segment of the left lower lobe on series 3,   image 39 appears stable  No obvious endobronchial lesion      Decreased bilateral pleural effusions and pulmonary edema as described above with a residual small right pleural effusion remaining  Passive atelectasis in the lower lobes      Multiple pulmonary nodules as described above  Short-term follow-up chest CT study is advised in 3 months      No hydronephrosis or obstructive uropathy      No bowel obstruction or focal inflammatory process      Stable additional findings as above               Orders:  -     CT chest wo contrast; Future; Expected date: 10/17/2022    17  Rash  Assessment & Plan:  1  In both hands dry the scaly rash with dry cracks    Will recommend moisturizing cream twice a day as well as  Elcon  cream at nighttime both hands    2  Fungus Cream: clean with soap and water daily and keep it dry  Stop Nystatin  Lotrisone cream twice a day under breast twice a day    3  Rash on left fore arm 2 inches  Reddish flat  Apply elcon lotion daily    Test for Lyme's titre    Orders:  -     mometasone (ELOCON) 0 1 % cream; Apply topically daily Apply on both hands and left fore arm  -     clotrimazole-betamethasone (LOTRISONE) 1-0 05 % cream; Apply topically 2 (two) times a day Under both breast  -     Lyme Total Antibody Profile with reflex to WB; Future; Expected date: 08/19/2022    18  Essential hypertension  Assessment & Plan:  BP well controlled    Continue  Torsemide 20 mg two in AM and one in PM  Continue Metoprolol tart 50 mg twice a day  No ACE/ARB      Volume status fair            Discussion Summary and Plan: Today's care plan and medications were reviewed with patient in detail and all their questions answered to their satisfaction  Chief Complaint   Patient presents with    Hypertension    Hyperlipidemia    Diabetes    Congestive Heart Failure    CKD III     /CKD 4 GFR 30 range    Atrial Fibrillation    Obesity    Follow-up     Lung nodule, neuropathy, anemia, shoulder fracture improved,    Rash      Subjective:  Patient is here for chronic disease management  Seven generally seems to be doing fair  Edema is mild breathing is better denies any chest pain palpitation no hypoglycemia no cough chest congestion no fever no chills  To area of edema on the left back of forearm approximately 1-1/2 inch a new  Cellulitis on the right arm is gone  Recurrent CHF:  Hospitalization on 06/04/2022 discharged on 06/12/2022  ProBNP 531 1859  EKG normal sinus rhythm  Dry weight 244 lb  Ejection fraction 53%      Wt Readings from Last 3 Encounters:  08/19/22 112 kg (248 lb)  07/20/22 112 kg (246 lb 4 oz)  07/15/22 110 kg (242 lb)    Echo: EF: normal 2022  Nuclear Stress test: negative 2022  Dry weight 242-248 lb  --beta-blocker:   metoprolol tartrate 50 mg q 12    --Diuretic:   torsemide 40 mg in the morning 20 mg in the p m  since 7/15/22  --ACE/ARB/ARNI:  contraindicated  --MRA:   --SLGT2I  --ICD:   --2 g sodium diet, 1800 cc fluid restriction  Daily weights, call weight gain 2-3 lb in 1 day or 5 lb in 5 days    Cardiac work up:      Echocardiogram 3/10/22  LVEF: 53%  LVIDd: normal cavity size  RV: normal size and systolic function  MR: none  PASP: inadequate TR envelope for estimation  RVOT: no notching, more parabolic  Other: unable to assess diastolic function, mildly increased wall thickness     Pharm Nuc Stress 7/31/2019:  There was a small, mildly severe, fixed myocardial perfusion defect of the apical apical and anterior wall likely due to attenuation from breast tissue  Echo 5/29/22: LVEF 55%  LVIDD 4 1cm  Mild to moderately increased wall thickness  Grade 1 diastology  Mild MR  Normal RV size and systolic     Stress test : No ischemia  8-:   Stress ECG: No ST deviation is noted  The ECG was not diagnostic due to pharmacological (vasodilator) stress    Perfusion: There is a left ventricular perfusion defect that is small in size with mild reduction in uptake present in the anterior and apex location(s) that is fixed  The defect appears to be an artifact caused by breast attenuation    Stress Function: Left ventricular function post-stress is normal  Post-stress ejection fraction is 70 %      No ischemia seen  Thrombophlebitis right forearm is status post Keflex    CKD lelve 3/4; renal function acceptable, see bmp BUN 61 Creat 1 69, pth 80, gfr 30 k ok hb 11 8      Obstructive sleep apnea:  Under care of pulmonologist currently on BiPAP 12 hour 6  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:  Continue BiPAP    PFT was done on May 18, 2022   Forced vital capacity was 0 99 L or 36% of predicted, FEV1 was 0 72 L or 34% of predicted and obstruction ratio was 73%  Flow volume loop was restricted  Result Date: 2022  Narrative: Pulmonary Function Test Report Chun Gilmore 73 y o  female MRN: [id number] Date of Testin2022 Date of Interpretation: 2022 Requesting Physician: Dr Rudolph Headings Reason for Testing: SOB Reference set for interpretation:  Procedure: Testing was completed bedside  The patient demonstrated good effort and cooperation  The results of this test meet ATS standards for acceptability and repeatability  Data set appears appropriate for interpretation  Post bronchodilator testing performed after the administration of 2 5mg albuterol in 3cc normal saline administered via nebulizer per bronchodilator protocol  Results: FEV1/FVC Ratio: 73 % Forced Vital Capacity: 0 99 L 36 % predicted FEV1: 0 72 L 34 % predicted Interpretation: · Spirometry suggests abnormal lung volumes  Recommend checking lung volumes for further evaluation  · No significant response to the administration to bronchodilator per ATS standards · Flow volume loop is restricted  Diabetes last hemoglobin A1c worsen 9 0  Recently we increased her Toujeo 30 units daily from 25 units however her home sugar seems to be running high in the range of 150-200  Patient used to be on much higher dose of Toujeo in the remote past   Will increase Toujeo 35 units  Recommend diet exercise lifestyle modification and weight loss  Patient prefers not to see endocrinologist, dietitian, or diabetic nurse at this time  Recommend to write down sugar and check 3 times a day and bring the report with her next visit  Due for hemoglobin A1c in 3 months from the last time  Patient hemoglobin A1c was 9 0    PAF  Rate controlled rate rhythm regular tolerating current regimen with metoprolol succinate 50 mg daily and Eliquis 5 mg twice a day        Acute respiratory failure with hypoxia resolved as of 06/10/2022    The mildly hypoxic on admission worsen oxygen tolerated nighttime  Able to saturate appropriately on 06/06 a m  Without oxygen  Back to O2 supply at nighttime  The patient to use albuterol p r n       Asthma continue bronchodilator    Obesity BMI 45 point 54 last time  Lower extremity edema:  Mild weight 250 lb here discharge weight was 244    Hypertension  Hypertension well controlled it was running somewhat tight metoprolol tartrate 50 mg twice a day decreased to the mild Toprol succinate 50 mg once a day    Hyperlipidemia  Remains on rosuvastatin due for lipid profile and CMP in a month    Status post left shoulder total replacement see did home exercise he does not think she needs to go for the how occupational therapy    Recent UTI  Resolved  Lung nodule:  CT scan done on 8 report awaited     Anemia:  Improved 11 8 at this time  Follow-up iron studies, iron 52, iron sat 20% ferritin 236, TIBC 261  B12, folate adequate  Prior immunofixation without any evidence of monoclonal gammopathy    Lower leg edema:  Mild to moderate  Recent chronic respiratory failure with CO2 retention:  Patient does have a hypoventilation syndrome  Good functional capacity uses CPAP during daytime 2-3 hours    07/20/2022:  Creatinine 1 69 BUN 61 calcium 9 0 LFT normal GFR 30 hemoglobin A1c 9 0 cholesterol 141 LDL 31 HDL 35 hemoglobin 11 8 rest CBC normal, urinalysis unremarkable, PTH 80 5 vitamin-D 23 9 phosphorus 4 6 magnesium 1 9 urine for protein/creatinine ratio 0 19    06/16/2022:  GFR 32 creatinine 1 62 BUN 77 hemoglobin 10 5 rest of the CBC normal  05/13/2022:  Creatinine 1 49 BUN 36 rest of the LFT unremarkable, INR 1 18, blood sugar 282 hemoglobin 9 2  06/12/2022: BMP normal except BUN 97 and creatinine 1 93, CO2 35  06/11/2022: BMP normal except BUN 98 creatinine 1 944, CO2 36, hemoglobin 10 2 and platelet 609  21/72/5774:  BUN 91 creatinine 2 06 CO2 35    07/08/2022:  CT scan of the chest:   Nodules as described above    There is interval enlargement of one of the groundglass nodules at the right lower lobe as well as interval development of several groundglass nodules  These are likely of infectious or inflammatory etiology  Recommend   follow-up in 3 months to document stability or interval change      03/29/2022:  CT scan of chest abdomen and pelvis:  Decreased bilateral effusion, pulmonary edema as described, passive atelectasis in the lower lobes, multiple lung nodule as described follow-up CT scan in 3 months recommended, small fat containing periumbilical hernia, degenerative changes in the thoracolumbar area, mild level scoliosis,  03/29/2022:  Venous Doppler right upper extremity shows no evidence of thrombus in the internal jugular subclavian or brachiocephalic vein, left upper extremity evidence of superficial thrombophlebitis noted in the cephalic vein no evidence of acute or deep vein thrombosis otherwise  05/13/2022:  Chest x-ray mild pulmonary vascular congestion with small bilateral effusion  05/16/2022:  Chest x-ray mild cardiomegaly with mild was increased vascular congestion increase bilateral pleural effusion  05/31/2022:  Chest x-ray:  Improving aeration compatible with improving CHF no acute infiltrate    05/16/2022:  CT scan of the brain no acute intracranial abnormality new left mastoid effusion       05/27/2022:  Pulmonary function test:FEV1/FVC Ratio: 73 % Forced Vital Capacity: 0 99 L 36 % predicted FEV1: 0 72 L 34 % predicted Interpretation: · Spirometry suggests abnormal lung volumes  Recommend checking lung volumes for further evaluation  · No significant response to the administration to bronchodilator per ATS standards · Flow volume loop is restricted    06/05/2022:  Chest x-ray no acute cardiopulmonary disease    Cardiac work up:  Echocardiogram 3/10/22  LVEF: 53%  LVIDd: normal cavity size  RV: normal size and systolic function  MR: none  PASP: inadequate TR envelope for estimation  RVOT: no notching, more parabolic  Other: unable to assess diastolic function, mildly increased wall thickness     Pharm Nuc Stress 7/31/2019:  There was a small, mildly severe, fixed myocardial perfusion defect of the apical apical and anterior wall likely due to attenuation from breast tissue  Echo 5/29/22: LVEF 55%  LVIDD 4 1cm  Mild to moderately increased wall thickness  Grade 1 diastology  Mild MR  Normal RV size and systolic     79/53/8638:  Creatinine 1 69 BUN 61 calcium 9 0 LFT normal GFR 30 hemoglobin A1c 9 0 cholesterol 141 LDL 31 HDL 35 hemoglobin 11 8 rest CBC normal, urinalysis unremarkable, PTH 80 5 vitamin-D 23 9 phosphorus 4 6 magnesium 1 9 urine for protein/creatinine ratio 0 19          The following portions of the patient's history were reviewed and updated as appropriate: allergies, current medications, past family history, past medical history, past social history, past surgical history and problem list     Review of Systems   All other systems reviewed and are negative          Historical Information   Patient Active Problem List   Diagnosis    Asthma    Morbid obesity with BMI of 45 0-49 9, adult (HCC)    Lower leg edema    Paroxysmal atrial fibrillation (HCC)    Mixed hyperlipidemia    Anemia    Essential hypertension    Humeral head fracture    PONV (postoperative nausea and vomiting)    Diabetes mellitus, type 2 (HCC)    Gastroesophageal reflux disease    Nephrolithiasis    Arthritis    S/P total knee replacement, right    On continuous oral anticoagulation - eliquis    Closed fracture of proximal end of left humerus with routine healing    Status post reverse total replacement of left shoulder    CKD (chronic kidney disease)    Peripheral venous insufficiency    Post-herpetic polyneuropathy    Raynaud's disease    Lung nodule < 6cm on CT    Chronic diastolic heart failure (HCC)    Pulmonary nodules    Lump of left breast    Pressure injury of deep tissue of sacral region    Morbid obesity ([address] )    Acute on chronic respiratory failure with hypoxia and hypercapnia (HCC)    Obesity hypoventilation syndrome (HCC)    KATRINA (obstructive sleep apnea)    Elevated serum creatinine    Thrombophlebitis of arm, right    Chronic respiratory failure with hypoxia and hypercapnia (HCC)    Platelets decreased (HCC)    Cellulitis of left upper extremity    PSVT (paroxysmal supraventricular tachycardia) (HCC)    Stage 3b chronic kidney disease (HCC)    Rash     Past Medical History:   Diagnosis Date    Anemia     Asthma     COVID-19 January 2022    GERD (gastroesophageal reflux disease)     Hyperlipidemia     PONV (postoperative nausea and vomiting)     SVT (supraventricular tachycardia) (HCC)      Past Surgical History:   Procedure Laterality Date    APPENDECTOMY      CYSTOSCOPY W/ LASER LITHOTRIPSY Left 7/12/2016    Procedure: CYSTOSCOPY URETEROSCOPY WITH LITHOTRIPSY HOLMIUM LASER, RETROGRADE PYELOGRAM AND INSERTION STENT URETERAL;  Surgeon: Dorota Bernstein MD;  Location: [address] [place] [address];  Service:     DILATION AND CURETTAGE OF UTERUS      IR THORACENTESIS  3/18/2022    JOINT REPLACEMENT      right knee    KNEE ARTHROPLASTY Right     MT CYSTOURETHROSCOPY,URETER CATHETER Left 6/29/2016    Procedure: CYSTOSCOPY RETROGRADE PYELOGRAM WITH INSERTION STENT URETERAL, left;  Surgeon: Dorota Bernstein MD;  Location: [address] [place] [address];  Service: Urology    MT RECONSTR TOTAL SHOULDER IMPLANT Left 3/1/2022    Procedure: ARTHROPLASTY SHOULDER REVERSE;  Surgeon: Dorene Wilson MD;  Location: [facility];  Service: Orthopedics    SHOULDER ARTHROTOMY Left      Social History     Substance and Sexual Activity   Alcohol Use Not Currently    Comment: rarely     Social History     Substance and Sexual Activity   Drug Use No     Social History     Tobacco Use   Smoking Status Former Smoker    Packs/day: 1 00    Years: 20 00    Pack years: 20 00    Types: Cigarettes    Quit date: 7/8/1996    Years since quittin 1   Smokeless Tobacco Never Used     Family History   Problem Relation Age of Onset    Heart disease Mother     Emphysema Maternal Grandmother     Heart disease Family      Health Maintenance Due   Topic    Hepatitis C Screening     Pneumococcal Vaccine: 65+ Years (1 - PCV)    DM Eye Exam     Breast Cancer Screening: Mammogram     Osteoporosis Screening     Falls: Plan of Care     COVID-19 Vaccine (2 - Moderna series)    Influenza Vaccine (1)      Meds/Allergies       Current Outpatient Medications:     acetaminophen (TYLENOL) 325 mg tablet, Take 650 mg by mouth every 6 (six) hours as needed for mild pain  , Disp: , Rfl:     albuterol (PROVENTIL HFA,VENTOLIN HFA) 90 mcg/act inhaler, Inhale 2 puffs every 6 (six) hours as needed for wheezing, Disp: 8 g, Rfl: 4    ammonium lactate (LAC-HYDRIN) 12 % lotion, APPLY TOPICALLY TO AFFECTED AREAS twice a day, Disp: , Rfl:     apixaban (ELIQUIS) 5 mg, Take 1 tablet (5 mg total) by mouth in the morning and 1 tablet (5 mg total) in the evening , Disp: 180 tablet, Rfl: 1    b complex-vitamin C-folic acid (NEPHROCAPS) 1 mg capsule, Take 1 capsule by mouth daily, Disp: 30 capsule, Rfl: 5    clotrimazole-betamethasone (LOTRISONE) 1-0 05 % cream, Apply topically 2 (two) times a day Under both breast, Disp: 45 g, Rfl: 2    docusate sodium (COLACE) 100 mg capsule, Take 100 mg by mouth in the morning, Disp: , Rfl:     glucose blood test strip, Use 1 each 2 (two) times a day Use one 2 times daily, Disp: 200 strip, Rfl: 5    insulin lispro (HumaLOG KwikPen) 100 units/mL injection pen, 3 times with meal according to sliding scale - >Blood Glucose 150 - 199: 2 Unit of Insulin, Blood Glucose 200 - 249: 4 Units of Insulin, Blood Glucose 250 - 299: 6 Units of Insulin, Blood Glucose 300 - 349: 8 Units of Insulin, Blood Glucose 350 - 399: 10 Units of Inuslin,Blood Glucose greater than or equal to 400: 12 Units of Insulin-please contact your physician, Disp: 15 mL, Rfl: 0    Insulin Pen Needle (BD Pen Needle Pilar 2nd Gen) 32G X 4 MM MISC, For use with insulin pen  Pharmacy may dispense brand covered by insurance , Disp: 100 each, Rfl: 0    Insulin Pen Needle (NovoFine Autocover) 30G X 8 MM MISC, Inject under the skin daily, Disp: 100 each, Rfl: 3    Insulin Pen Needle 30G X 8 MM MISC, 2 (two) times a day, Disp: , Rfl:     Lancets (onetouch ultrasoft) lancets, Use once daily, Disp: 100 each, Rfl: 2    metoprolol succinate (TOPROL-XL) 50 mg 24 hr tablet, Take 1 tablet (50 mg total) by mouth daily, Disp: 30 tablet, Rfl: 3    mometasone (ELOCON) 0 1 % cream, Apply topically daily Apply on both hands and left fore arm, Disp: 45 g, Rfl: 0    montelukast (SINGULAIR) 10 mg tablet, Take 10 mg by mouth daily  , Disp: , Rfl:     pregabalin (LYRICA) 100 mg capsule, Take 1 capsule (100 mg total) by mouth 2 (two) times a day, Disp: 180 capsule, Rfl: 1    rosuvastatin (CRESTOR) 10 MG tablet, Take 1 tablet (10 mg total) by mouth daily, Disp: 90 tablet, Rfl: 1    sodium chloride (OCEAN) 0 65 % nasal spray, 1 spray into each nostril every hour as needed for congestion, Disp: , Rfl: 0    torsemide (DEMADEX) 20 mg tablet, Take 2 tablets in the AM and 1 tablet in PM, Disp: 90 tablet, Rfl: 1    Toujeo SoloStar 300 units/mL CONCENTRATED U-300 injection pen (1-unit dial), Inject 35 Units under the skin daily at bedtime, Disp: 4 5 mL, Rfl: 3    Trulicity 1 5 PS/8 4UJ SOPN, , Disp: , Rfl:       Objective:    Vitals:   /79   Pulse 65   Ht 5' 2" (1 575 m)   Wt 112 kg (248 lb)   SpO2 95%   BMI 45 36 kg/m²   Body mass index is 45 36 kg/m²  Vitals:    08/19/22 1540   Weight: 112 kg (248 lb)       Physical Exam  Vitals reviewed  Exam conducted with a chaperone present  Constitutional:       General: She is not in acute distress  Appearance: She is well-developed  She is obese  She is not ill-appearing, toxic-appearing or diaphoretic     HENT:      Head: Normocephalic and atraumatic  Right Ear: External ear normal       Left Ear: External ear normal       Nose: No congestion or rhinorrhea  Eyes:      General: Lids are normal          Right eye: No discharge  Left eye: No discharge  Conjunctiva/sclera: Conjunctivae normal    Neck:      Thyroid: No thyroid mass, thyromegaly or thyroid tenderness  Vascular: No carotid bruit or JVD  Trachea: Trachea normal    Cardiovascular:      Rate and Rhythm: Rhythm irregular  Heart sounds: Normal heart sounds  No murmur heard  Pulmonary:      Effort: No respiratory distress  Breath sounds: Normal breath sounds  No wheezing, rhonchi or rales  Abdominal:      General: Bowel sounds are normal  There is no distension  Palpations: There is no mass  Tenderness: There is no abdominal tenderness  There is no rebound  Musculoskeletal:      Right lower le+ Pitting Edema present  Left lower le+ Pitting Edema present  Lymphadenopathy:      Cervical: No cervical adenopathy  Skin:     General: Skin is warm  Coloration: Skin is not jaundiced or pale  Findings: No rash  Neurological:      Mental Status: She is alert  Coordination: Coordination normal       Gait: Gait abnormal    Psychiatric:         Mood and Affect: Mood normal          Behavior: Behavior normal          Thought Content:  Thought content normal          Judgment: Judgment normal          Lab Review   Lab on 2022   Component Date Value Ref Range Status    Sodium 2022 140  135 - 147 mmol/L Final    Potassium 2022 4 1  3 5 - 5 3 mmol/L Final    Chloride 2022 98  96 - 108 mmol/L Final    CO2 2022 35 (A) 21 - 32 mmol/L Final    ANION GAP 2022 7  4 - 13 mmol/L Final    BUN 2022 61 (A) 5 - 25 mg/dL Final    Creatinine 2022 1 69 (A) 0 60 - 1 30 mg/dL Final    Standardized to IDMS reference method    Glucose, Fasting 2022 228 (A) 65 - 99 mg/dL Final    Specimen collection should occur prior to Sulfasalazine administration due to the potential for falsely depressed results  Specimen collection should occur prior to Sulfapyridine administration due to the potential for falsely elevated results   Calcium 07/20/2022 9 0  8 4 - 10 2 mg/dL Final    AST 07/20/2022 14  13 - 39 U/L Final    Specimen collection should occur prior to Sulfasalazine administration due to the potential for falsely depressed results   ALT 07/20/2022 14  7 - 52 U/L Final    Specimen collection should occur prior to Sulfasalazine administration due to the potential for falsely depressed results   Alkaline Phosphatase 07/20/2022 57  34 - 104 U/L Final    Total Protein 07/20/2022 7 5  6 4 - 8 4 g/dL Final    Albumin 07/20/2022 3 8  3 5 - 5 0 g/dL Final    Total Bilirubin 07/20/2022 0 35  0 20 - 1 00 mg/dL Final    eGFR 07/20/2022 30  ml/min/1 73sq m Final    Hemoglobin A1C 07/20/2022 9 0 (A) Normal 3 8-5 6%; PreDiabetic 5 7-6 4%; Diabetic >=6 5%; Glycemic control for adults with diabetes <7 0% % Final    EAG 07/20/2022 212  mg/dl Final    Cholesterol 07/20/2022 141  See Comment mg/dL Final    Cholesterol:         Pediatric <18 Years        Desirable          <170 mg/dL      Borderline High    170-199 mg/dL      High               >=200 mg/dL        Adult >=18 Years            Desirable         <200 mg/dL      Borderline High   200-239 mg/dL      High              >239 mg/dL      Triglycerides 07/20/2022 376 (A) See Comment mg/dL Final    Triglyceride:     0-9Y            <75mg/dL     10Y-17Y         <90 mg/dL       >=18Y     Normal          <150 mg/dL     Borderline High 150-199 mg/dL     High            200-499 mg/dL        Very High       >499 mg/dL    Specimen collection should occur prior to N-Acetylcysteine or Metamizole administration due to the potential for falsely depressed results      HDL, Direct 07/20/2022 35 (A) >=50 mg/dL Final    LDL Calculated 07/20/2022 31  0 - 100 mg/dL Final    LDL Cholesterol:     Optimal           <100 mg/dl     Near Optimal      100-129 mg/dl     Above Optimal       Borderline High 130-159 mg/dl       High            160-189 mg/dl       Very High       >189 mg/dl         This screening LDL is a calculated result  It does not have the accuracy of the Direct Measured LDL in the monitoring of patients with hyperlipidemia and/or statin therapy  Direct Measure LDL (AMD299) must be ordered separately in these patients      Non-HDL-Chol (CHOL-HDL) 07/20/2022 106  mg/dl Final    WBC 07/20/2022 7 02  4 31 - 10 16 Thousand/uL Final    RBC 07/20/2022 4 25  3 81 - 5 12 Million/uL Final    Hemoglobin 07/20/2022 11 8  11 5 - 15 4 g/dL Final    Hematocrit 07/20/2022 38 4  34 8 - 46 1 % Final    MCV 07/20/2022 90  82 - 98 fL Final    MCH 07/20/2022 27 8  26 8 - 34 3 pg Final    MCHC 07/20/2022 30 7 (A) 31 4 - 37 4 g/dL Final    RDW 07/20/2022 14 6  11 6 - 15 1 % Final    Platelets 12/61/1801 156  149 - 390 Thousands/uL Final    MPV 07/20/2022 10 7  8 9 - 12 7 fL Final    Color, UA 07/20/2022 Light Yellow   Final    Clarity, UA 07/20/2022 Clear   Final    Specific Gravity, UA 07/20/2022 1 012  1 003 - 1 030 Final    pH, UA 07/20/2022 5 0  4 5, 5 0, 5 5, 6 0, 6 5, 7 0, 7 5, 8 0 Final    Leukocytes, UA 07/20/2022 Large (A) Negative Final    Nitrite, UA 07/20/2022 Negative  Negative Final    Protein, UA 07/20/2022 Negative  Negative mg/dl Final    Glucose, UA 07/20/2022 Negative  Negative mg/dl Final    Ketones, UA 07/20/2022 Negative  Negative mg/dl Final    Urobilinogen, UA 07/20/2022 <2 0  <2 0 mg/dl mg/dl Final    Bilirubin, UA 07/20/2022 Negative  Negative Final    Occult Blood, UA 07/20/2022 Trace (A) Negative Final    RBC, UA 07/20/2022 1-2  None Seen, 1-2 /hpf Final    WBC, UA 07/20/2022 20-30 (A) None Seen, 1-2 /hpf Final    Epithelial Cells 07/20/2022 Occasional  None Seen, Occasional /hpf Final    Bacteria, UA 07/20/2022 None Seen  None Seen, Occasional /hpf Final    Creatinine, Ur 07/20/2022 84 7  mg/dL Final    Protein Urine Random 07/20/2022 16  mg/dL Final    Prot/Creat Ratio, Ur 07/20/2022 0 19 (A) 0 00 - 0 10 Final    Magnesium 07/20/2022 1 9  1 9 - 2 7 mg/dL Final    PTH 07/20/2022 80 5 (A) 18 4 - 80 1 pg/mL Final    Vit D, 25-Hydroxy 07/20/2022 23 9 (A) 30 0 - 100 0 ng/mL Final    Phosphorus 07/20/2022 4 6 (A) 2 3 - 4 1 mg/dL Final   Lab on 07/11/2022   Component Date Value Ref Range Status    Sodium 07/11/2022 136  136 - 145 mmol/L Final    Potassium 07/11/2022 4 7  3 5 - 5 3 mmol/L Final    Slightly Hemolyzed; Results May be Affected    Chloride 07/11/2022 101  100 - 108 mmol/L Final    CO2 07/11/2022 31  21 - 32 mmol/L Final    ANION GAP 07/11/2022 4  4 - 13 mmol/L Final    BUN 07/11/2022 57 (A) 5 - 25 mg/dL Final    Creatinine 07/11/2022 1 69 (A) 0 60 - 1 30 mg/dL Final    Standardized to IDMS reference method    Glucose 07/11/2022 278 (A) 65 - 140 mg/dL Final    If the patient is fasting, the ADA then defines impaired fasting glucose as > 100 mg/dL and diabetes as > or equal to 123 mg/dL  Specimen collection should occur prior to Sulfasalazine administration due to the potential for falsely depressed results  Specimen collection should occur prior to Sulfapyridine administration due to the potential for falsely elevated results      Calcium 07/11/2022 9 3  8 3 - 10 1 mg/dL Final    eGFR 07/11/2022 30  ml/min/1 73sq m Final    WBC 07/11/2022 7 86  4 31 - 10 16 Thousand/uL Final    RBC 07/11/2022 4 10  3 81 - 5 12 Million/uL Final    Hemoglobin 07/11/2022 11 3 (A) 11 5 - 15 4 g/dL Final    Hematocrit 07/11/2022 37 0  34 8 - 46 1 % Final    MCV 07/11/2022 90  82 - 98 fL Final    MCH 07/11/2022 27 6  26 8 - 34 3 pg Final    MCHC 07/11/2022 30 5 (A) 31 4 - 37 4 g/dL Final    RDW 07/11/2022 14 6  11 6 - 15 1 % Final    Platelets 78/40/6509 137 (A) 149 - 390 Thousands/uL Final    MPV 07/11/2022 12 5  8 9 - 12 7 fL Final   Office Visit on 07/11/2022   Component Date Value Ref Range Status    Creatinine, Ur 07/20/2022 86 8  mg/dL Final    Microalbum  ,U,Random 07/20/2022 37 3 (A) 0 0 - 20 0 mg/L Final    Microalb Creat Ratio 07/20/2022 43 (A) 0 - 30 mg/g creatinine Final         Patient Instructions   1  In both hands dry the scaly rash with dry cracks  Will recommend moisturizing cream twice a day as well as  Elcon  cream at nighttime both hands    2  Fungus Cream: clean with soap and water daily and keep it dry  Stop Nystatin  Lotrisone cream twice a day under breast twice a day    3  Rash on left fore arm 2 inches  Reddish flat  Apply elcon lotion daily    Test for Lyme's titre    Go for CT scan of the chest for 3 month surveillance for follow-up of your lung nodules  Increase your to Tujeo  units and take 35 units  Paragraphs check your sugar 3 times a day and write it down and bring with you every time    Please do not take any metformin any time  Please do not take any anti-inflammatory ibuprofen Aleve etc   Any time  Basic Carbohydrate Counting   AMBULATORY CARE:   Carbohydrate counting  is a way to plan your meals by counting the amount of carbohydrate in foods  Carbohydrates are the sugars, starches, and fiber found in fruit, grains, vegetables, and milk products  Carbohydrates increase your blood sugar levels  Carbohydrate counting can help you eat the right amount of carbohydrate to keep your blood sugar levels under control  What you need to know about planning meals using carbohydrate counting:  A dietitian or healthcare provider will help you develop a healthy meal plan that works best for you  You will be taught how much carbohydrate to eat or drink for each meal and snack  Your meal plan will be based on your age, weight, usual food intake, and physical activity level  If you have diabetes, it will also include your blood sugar levels and diabetes medicine   Once you know how much carbohydrate you should eat, you can decide what type of food you want to eat  You will need to know what foods contain carbohydrate and how much they contain  Keep track of the amount of carbohydrate in meals and snacks in order to follow your meal plan  Do not avoid carbohydrates or skip meals  Your blood sugar may fall too low if you do not eat enough carbohydrate or you skip meals  Foods that contain carbohydrate:   Breads:  Each serving of food listed below contains about 15 g of carbohydrate   1 slice of bread (1 ounce) or 1 flour or corn tortilla (6 inch)    ½ of a hamburger bun or ¼ of a large bagel (about 1 ounce)    1 pancake (about 4 inches across and ¼ inch thick)    Cereals and grains:  Serving sizes of ready-to-eat cereals vary  Look at the serving size and the total carbohydrate amount listed on the food label  Each serving of food listed below contains about 15 g of carbohydrate   ¾ cup of dry, unsweetened, ready-to-eat cereal or ¼ cup of low-fat granola     ½ cup of oatmeal or other cooked cereal     ? cup of cooked rice or pasta    Starchy vegetables and beans:  Each serving of food listed below contains about 15 g of carbohydrate   ½ cup of corn, green peas, sweet potatoes, or mashed potatoes    ¼ of a large baked potato    ½ cup of beans, lentils, and peas (garbanzo, vazquez, kidney, white, split, black-eyed)    Crackers and snacks:  Each serving of food listed below contains about 15 g of carbohydrate   3 juancho cracker squares or 8 animal crackers     6 saltine-type crackers    3 cups of popcorn or ¾ ounce of pretzels, potato chips, or tortilla chips    Fruit:  Each serving of food listed below contains about 15 g of carbohydrate       1 small (4 ounce) piece of fresh fruit or ¾ to 1 cup of fresh fruit    ½ cup of canned or frozen fruit, packed in natural juice    ½ cup (4 ounces) of unsweetened fruit juice    2 tablespoons of dried fruit    Desserts or sugary foods: Each serving of food listed below contains about 15 g of carbohydrate   2-inch square unfrosted cake or brownie     2 small cookies    ½ cup of ice cream, frozen yogurt, or nondairy frozen yogurt    ¼ cup of sherbet or sorbet    1 tablespoon of regular syrup, jam, or jelly    2 tablespoons of light syrup    Milk and yogurt:  Foods from the milk group contain about 12 g of carbohydrate per serving  1 cup of fat-free or low-fat milk    1 cup of soy milk    ? cup of fat-free, yogurt sweetened with artificial sweetener    Non-starchy vegetables:  Each serving contains about 5 g of carbohydrate   Three servings of non-starch vegetables count as 1 carbohydrate serving  ½ cup of cooked vegetables or 1 cup of raw vegetables  This includes beets, broccoli, cabbage, cauliflower, cucumber, mushrooms, tomatoes, and zucchini    ½ cup of vegetable juice    How to use carbohydrate counting to plan meals:   Count carbohydrate amounts using serving sizes:      Pasta dinner example: You plan to have pasta, tossed salad, and an 8-ounce glass of milk  Your healthcare provider tells you that you may have 4 carbohydrate servings for dinner  One carbohydrate serving of pasta is ? cup  One cup of pasta will equal 3 carbohydrate servings  An 8-ounce glass of milk will count as 1 carbohydrate serving  These amounts of food would equal 4 carbohydrate servings  One cup of tossed salad does not count toward your carbohydrate servings  Count carbohydrate amounts using food labels:  Find the total amount of carbohydrate in a packaged food by reading the food label  Food labels tell you the serving size of the food and the total carbohydrate amount in each serving  Find the serving size on the food label and then decide how many servings you will eat  Multiply the number of servings you plan to eat by the carbohydrate amount per serving  Granola bar snack example:   Your meal plan allows you to have 2 carbohydrate servings (30 grams) of carbohydrate for a snack  You plan to eat 1 package of granola bars, which contains 2 bars  According to the food label, the serving size of food in this package is 1 bar  Each serving (1 bar) contains 25 grams of carbohydrate  The total amount of carbohydrate in this package of granola bars would be 50 g  Based on your meal plan, you should eat only 1 bar  Follow up with your doctor as directed:  Write down your questions so you remember to ask them during your visits  © Copyright [employer] 2022 Information is for End User's use only and may not be sold, redistributed or otherwise used for commercial purposes  All illustrations and images included in CareNotes® are the copyrighted property of A D A M [employer] Inc  or [facility] Hemanth Trujillo   The above information is an  only  It is not intended as medical advice for individual conditions or treatments  Talk to your doctor, nurse or pharmacist before following any medical regimen to see if it is safe and effective for you  Dr Sonja Lobo MD  [facility]       "This note has been constructed using a voice recognition system  Therefore there may be syntax, spelling, and/or grammatical errors   Please call if you have any questions  "

## 2022-08-19 NOTE — ASSESSMENT & PLAN NOTE
Wt Readings from Last 3 Encounters:   08/19/22 112 kg (248 lb)   07/20/22 112 kg (246 lb 4 oz)   07/15/22 110 kg (242 lb)     Echo:   EF:  Nuclear Stress test:   Dry weight 242-248 lb  --beta-blocker:   metoprolol tartrate 50 mg q 12    --Diuretic:   torsemide 40 mg in the morning 20 mg in the p m  since 7/15/22  --ACE/ARB/ARNI:    --MRA:   --SLGT2I  --ICD:   --2 g sodium diet, 1800 cc fluid restriction  Daily weights, call weight gain 2-3 lb in 1 day or 5 lb in 5 days    Cardiac work up:      Echocardiogram 3/10/22  LVEF: 53%  LVIDd: normal cavity size  RV: normal size and systolic function  MR: none  PASP: inadequate TR envelope for estimation  RVOT: no notching, more parabolic  Other: unable to assess diastolic function, mildly increased wall thickness     Pharm Nuc Stress 7/31/2019:  There was a small, mildly severe, fixed myocardial perfusion defect of the apical apical and anterior wall likely due to attenuation from breast tissue  Echo 5/29/22: LVEF 55%  LVIDD 4 1cm  Mild to moderately increased wall thickness  Grade 1 diastology  Mild MR  Normal RV size and systolic     Stress test : No ischemia  8-:   Stress ECG: No ST deviation is noted  The ECG was not diagnostic due to pharmacological (vasodilator) stress    Perfusion: There is a left ventricular perfusion defect that is small in size with mild reduction in uptake present in the anterior and apex location(s) that is fixed  The defect appears to be an artifact caused by breast attenuation    Stress Function: Left ventricular function post-stress is normal  Post-stress ejection fraction is 70 %      No ischemia seen

## 2022-08-19 NOTE — ASSESSMENT & PLAN NOTE
Acute component resolved  Currently on oxygen nasal 2 L at nighttime  Uses CPAP 2-3 hours in the afternoon      Under care

## 2022-08-19 NOTE — ASSESSMENT & PLAN NOTE
1  In both hands dry the scaly rash with dry cracks  Will recommend moisturizing cream twice a day as well as  Elcon  cream at nighttime both hands    2  Fungus Cream: clean with soap and water daily and keep it dry  Stop Nystatin  Lotrisone cream twice a day under breast twice a day    3   Rash on left fore arm 2 inches  Reddish flat  Apply elcon lotion daily    Test for Lyme's titre

## 2022-08-19 NOTE — ASSESSMENT & PLAN NOTE
Metoprolol were changed to metoprolol succinate 50 mg daily as well as patient remains on Eliquis 5 mg twice a day  Rate is controlled  Denies any palpitation dizziness  No syncope  LVF compensated

## 2022-08-19 NOTE — ASSESSMENT & PLAN NOTE
Acute component resolved  Currently on oxygen nasal 2 L at nighttime  Uses CPAP 2-3 hours in the afternoon      Under care of pulmonologist

## 2022-08-19 NOTE — ASSESSMENT & PLAN NOTE
Anemia is better  Hemoglobin 11 8    Will continue to monitor last time it was 10 5    Lab Results   Component Value Date    WBC 7 02 07/20/2022    HGB 11 8 07/20/2022    HCT 38 4 07/20/2022    MCV 90 07/20/2022     07/20/2022

## 2022-08-19 NOTE — ASSESSMENT & PLAN NOTE
Acute component resolved  Currently on oxygen nasal 2 L at nighttime  Uses CPAP 2-3 hours in the afternoon        Care of pulmonologist

## 2022-08-26 ENCOUNTER — HOSPITAL ENCOUNTER (OUTPATIENT)
Dept: NON INVASIVE DIAGNOSTICS | Facility: CLINIC | Age: 72
Discharge: HOME | End: 2022-08-26
Payer: MEDICARE

## 2022-08-26 VITALS — WEIGHT: 248 LBS | HEIGHT: 62 IN | BODY MASS INDEX: 45.64 KG/M2

## 2022-08-26 DIAGNOSIS — I50.32 CHRONIC DIASTOLIC HEART FAILURE (HCC): ICD-10-CM

## 2022-08-26 DIAGNOSIS — I10 ESSENTIAL HYPERTENSION: ICD-10-CM

## 2022-08-26 DIAGNOSIS — E78.2 MIXED HYPERLIPIDEMIA: ICD-10-CM

## 2022-08-26 LAB
BASELINE ST DEPRESSION: 0 MM
MAX HR: 114 BPM
NUC STRESS EJECTION FRACTION: 70 %
RATE PRESSURE PRODUCT: NORMAL
SL CV REST NUCLEAR ISOTOPE DOSE: 16.5 MCI
SL CV STRESS NUCLEAR ISOTOPE DOSE: 46.6 MCI
SL CV STRESS RECOVERY BP: NORMAL MMHG
SL CV STRESS RECOVERY HR: 86 BPM
SL CV STRESS RECOVERY O2 SAT: 97 %
STRESS ANGINA INDEX: 1
STRESS BASELINE BP: NORMAL MMHG
STRESS BASELINE HR: 64 BPM
STRESS DUKE TREADMILL SCORE: -1
STRESS O2 SAT REST: 97 %
STRESS PEAK HR: 114 BPM
STRESS POST EXERCISE DUR MIN: 3 MIN
STRESS POST EXERCISE DUR SEC: 0 SEC
STRESS POST O2 SAT PEAK: 100 %
STRESS POST PEAK BP: 118 MMHG
STRESS ST DEPRESSION: 0 MM

## 2022-08-26 PROCEDURE — 93016 CV STRESS TEST SUPVJ ONLY: CPT | Performed by: INTERNAL MEDICINE

## 2022-08-26 PROCEDURE — 93018 CV STRESS TEST I&R ONLY: CPT | Performed by: INTERNAL MEDICINE

## 2022-08-26 PROCEDURE — 93017 CV STRESS TEST TRACING ONLY: CPT

## 2022-08-26 PROCEDURE — A9502 TC99M TETROFOSMIN: HCPCS

## 2022-08-26 PROCEDURE — 78452 HT MUSCLE IMAGE SPECT MULT: CPT

## 2022-08-26 PROCEDURE — G1004 CDSM NDSC: HCPCS

## 2022-08-26 PROCEDURE — 78452 HT MUSCLE IMAGE SPECT MULT: CPT | Performed by: INTERNAL MEDICINE

## 2022-08-26 RX ADMIN — REGADENOSON 0.4 MG: 0.08 INJECTION, SOLUTION INTRAVENOUS at 14:10

## 2022-08-28 VITALS
DIASTOLIC BLOOD PRESSURE: 79 MMHG | HEIGHT: 62 IN | OXYGEN SATURATION: 95 % | HEART RATE: 65 BPM | BODY MASS INDEX: 45.64 KG/M2 | WEIGHT: 248 LBS | SYSTOLIC BLOOD PRESSURE: 122 MMHG

## 2022-08-28 NOTE — ASSESSMENT & PLAN NOTE
Lab Results   Component Value Date    CHOLESTEROL 141 07/20/2022    CHOLESTEROL 104 03/10/2022     Lab Results   Component Value Date    HDL 35 (L) 07/20/2022    HDL 30 (L) 03/10/2022     Lab Results   Component Value Date    TRIG 376 (H) 07/20/2022    TRIG 132 03/10/2022     Lab Results   Component Value Date    NONHDLC 106 07/20/2022       Alt wnl    Continue crestor 10 mg daily and

## 2022-08-28 NOTE — ASSESSMENT & PLAN NOTE
Lab Results   Component Value Date    HGBA1C 9 0 (H) 07/20/2022   increase Toujeo 35 units daily  Continue trulicity same  BS was runnying high  Diet advised  BMI 45  No hypoglycemia  rec to keep track of home sugar three times a day and bring diary

## 2022-08-28 NOTE — ASSESSMENT & PLAN NOTE
BP well controlled    Continue  Torsemide 20 mg two in AM and one in PM  Continue Metoprolol tart 50 mg twice a day  No ACE/ARB      Volume status fair .

## 2022-08-28 NOTE — ASSESSMENT & PLAN NOTE
Lab Results   Component Value Date    EGFR 30 07/20/2022    EGFR 30 07/11/2022    EGFR 32 06/16/2022    CREATININE 1 69 (H) 07/20/2022    CREATININE 1 69 (H) 07/11/2022    CREATININE 1 62 (H) 06/16/2022   BUN 61

## 2022-08-30 ENCOUNTER — TELEPHONE (OUTPATIENT)
Dept: ADMINISTRATIVE | Facility: HOSPITAL | Age: 72
End: 2022-08-30

## 2022-08-30 NOTE — TELEPHONE ENCOUNTER
Adriana Kwan called to re-schedule her DXA bone density scan  Epic will not allow me to reschedule because the order  on 2022  Can you please add a new order? Thank you!

## 2022-08-31 ENCOUNTER — PATIENT OUTREACH (OUTPATIENT)
Dept: INTERNAL MEDICINE CLINIC | Facility: CLINIC | Age: 72
End: 2022-08-31

## 2022-08-31 LAB
MAX DIASTOLIC BP: 84 MMHG
MAX HEART RATE: 114 BPM
MAX PREDICTED HEART RATE: 150 BPM
MAX. SYSTOLIC BP: 128 MMHG
PROTOCOL NAME: NORMAL
REASON FOR TERMINATION: NORMAL
TARGET HR FORMULA: NORMAL
TEST INDICATION: NORMAL
TIME IN EXERCISE PHASE: NORMAL

## 2022-08-31 NOTE — PROGRESS NOTES
Bundle episode: CHF  6/4/22-9/10/22    Chart reviewed and PCP ordered patients Albuterol nebs  OPCM RN to follow

## 2022-09-02 ENCOUNTER — PATIENT OUTREACH (OUTPATIENT)
Dept: INTERNAL MEDICINE CLINIC | Facility: CLINIC | Age: 72
End: 2022-09-02

## 2022-09-02 NOTE — PROGRESS NOTES
Bundle Episode CHF follow up  6/4/22-9/10/22    Chart was reviewed and this OPCM RN attempted to call patient  There was no answer and a message was left  OPCM RN will follow

## 2022-09-09 ENCOUNTER — OFFICE VISIT (OUTPATIENT)
Dept: CARDIOLOGY CLINIC | Facility: CLINIC | Age: 72
End: 2022-09-09
Payer: MEDICARE

## 2022-09-09 ENCOUNTER — OFFICE VISIT (OUTPATIENT)
Dept: INTERNAL MEDICINE CLINIC | Facility: CLINIC | Age: 72
End: 2022-09-09
Payer: MEDICARE

## 2022-09-09 VITALS
HEART RATE: 62 BPM | DIASTOLIC BLOOD PRESSURE: 80 MMHG | SYSTOLIC BLOOD PRESSURE: 120 MMHG | HEIGHT: 62 IN | BODY MASS INDEX: 45.27 KG/M2 | OXYGEN SATURATION: 95 % | WEIGHT: 246 LBS

## 2022-09-09 VITALS
SYSTOLIC BLOOD PRESSURE: 112 MMHG | BODY MASS INDEX: 45.45 KG/M2 | HEART RATE: 73 BPM | OXYGEN SATURATION: 97 % | HEIGHT: 62 IN | WEIGHT: 247 LBS | DIASTOLIC BLOOD PRESSURE: 70 MMHG

## 2022-09-09 DIAGNOSIS — I10 ESSENTIAL HYPERTENSION: ICD-10-CM

## 2022-09-09 DIAGNOSIS — G47.33 OSA (OBSTRUCTIVE SLEEP APNEA): ICD-10-CM

## 2022-09-09 DIAGNOSIS — I48.0 PAROXYSMAL ATRIAL FIBRILLATION (HCC): ICD-10-CM

## 2022-09-09 DIAGNOSIS — N18.32 STAGE 3B CHRONIC KIDNEY DISEASE (HCC): ICD-10-CM

## 2022-09-09 DIAGNOSIS — I50.32 CHRONIC HEART FAILURE WITH PRESERVED EJECTION FRACTION (HCC): Primary | ICD-10-CM

## 2022-09-09 DIAGNOSIS — N18.32 STAGE 3B CHRONIC KIDNEY DISEASE (HCC): Primary | ICD-10-CM

## 2022-09-09 DIAGNOSIS — E78.2 MIXED HYPERLIPIDEMIA: ICD-10-CM

## 2022-09-09 DIAGNOSIS — J96.12 CHRONIC RESPIRATORY FAILURE WITH HYPOXIA AND HYPERCAPNIA (HCC): Chronic | ICD-10-CM

## 2022-09-09 DIAGNOSIS — J96.11 CHRONIC RESPIRATORY FAILURE WITH HYPOXIA AND HYPERCAPNIA (HCC): Chronic | ICD-10-CM

## 2022-09-09 DIAGNOSIS — R21 RASH: ICD-10-CM

## 2022-09-09 DIAGNOSIS — S42.295D OTHER CLOSED NONDISPLACED FRACTURE OF PROXIMAL END OF LEFT HUMERUS WITH ROUTINE HEALING, SUBSEQUENT ENCOUNTER: ICD-10-CM

## 2022-09-09 DIAGNOSIS — I50.32 CHRONIC DIASTOLIC HEART FAILURE (HCC): ICD-10-CM

## 2022-09-09 DIAGNOSIS — J45.909 MILD ASTHMA WITHOUT COMPLICATION, UNSPECIFIED WHETHER PERSISTENT: ICD-10-CM

## 2022-09-09 DIAGNOSIS — E11.00 TYPE 2 DIABETES MELLITUS WITH HYPEROSMOLARITY WITHOUT COMA, WITHOUT LONG-TERM CURRENT USE OF INSULIN (HCC): ICD-10-CM

## 2022-09-09 PROBLEM — J96.21 ACUTE ON CHRONIC RESPIRATORY FAILURE WITH HYPOXIA AND HYPERCAPNIA (HCC): Status: RESOLVED | Noted: 2022-05-16 | Resolved: 2022-09-09

## 2022-09-09 PROBLEM — J96.22 ACUTE ON CHRONIC RESPIRATORY FAILURE WITH HYPOXIA AND HYPERCAPNIA (HCC): Status: RESOLVED | Noted: 2022-05-16 | Resolved: 2022-09-09

## 2022-09-09 PROCEDURE — 99214 OFFICE O/P EST MOD 30 MIN: CPT | Performed by: INTERNAL MEDICINE

## 2022-09-09 PROCEDURE — 93000 ELECTROCARDIOGRAM COMPLETE: CPT | Performed by: INTERNAL MEDICINE

## 2022-09-09 RX ORDER — TORSEMIDE 20 MG/1
TABLET ORAL
Qty: 90 TABLET | Refills: 1
Start: 2022-09-09

## 2022-09-09 NOTE — PROGRESS NOTES
Heart Failure Outpatient Progress Note - Keesha Perez\[facility] [place] [facility]\"" 81 y o  female MRN: [id number]    Encounter: [id number]      Assessment/Plan:    Patient Active Problem List    Diagnosis Date Noted    PSVT (paroxysmal supraventricular tachycardia) ([facility] ) 07/17/2022    Cellulitis of left upper extremity 07/11/2022    Chronic respiratory failure with hypoxia and hypercapnia ([facility]) 06/14/2022    Platelets decreased ([facility] ) 06/14/2022    Thrombophlebitis of arm, right 06/11/2022    Elevated serum creatinine 06/08/2022    KATRINA (obstructive sleep apnea) 06/04/2022    Obesity hypoventilation syndrome ([place] [address] ) 05/17/2022    Acute on chronic respiratory failure with hypoxia and hypercapnia (HCC) 05/16/2022    Morbid obesity ([place] [address] ) 04/22/2022    Lump of left breast 04/08/2022    Pressure injury of deep tissue of sacral region 04/08/2022    Pulmonary nodules 03/30/2022    Chronic diastolic heart failure ([place] [address] ) 03/29/2022    Peripheral venous insufficiency 03/25/2022    Post-herpetic polyneuropathy 03/25/2022    Raynaud's disease 03/25/2022    Lung nodule < 6cm on CT 03/25/2022    CKD (chronic kidney disease)     Status post reverse total replacement of left shoulder 03/09/2022    Closed fracture of proximal end of left humerus with routine healing 03/01/2022    PONV (postoperative nausea and vomiting) 02/28/2022    Diabetes mellitus, type 2 ([facility] ) 02/28/2022    Gastroesophageal reflux disease 02/28/2022    Nephrolithiasis 02/28/2022    Arthritis 02/28/2022    S/P total knee replacement, right 02/28/2022    On continuous oral anticoagulation - eliquis 02/28/2022    Humeral head fracture 02/17/2022    Essential hypertension 08/01/2019    Anemia 07/31/2019    Mixed hyperlipidemia 06/25/2019    Paroxysmal atrial fibrillation ([facility] ) 05/28/2019    Lower leg edema 05/27/2019    Asthma 03/08/2018    Morbid obesity with BMI of 45 0-49 9, adult ([place] [address] ) 03/08/2018    Stage 3b chronic kidney disease ([place] [address] ) 08/19/2022    Rash 08/19/2022       Chronic heart failure with preserved ejection fraction  Euvolemic to mildly volume up    Weight: 242 lbs 7/15/22; 246 lbs 7/20; 247 lbs 9/9/22  NT proBNP:  1859 5/31/22    Studies- personally reviewed by me:    Pharm Nuc Stress 8/26/22: No ischemia  There is a left ventricular perfusion defect that is small in size with mild reduction in uptake present in the anterior and apex location(s) that is fixed  The defect appears to be an artifact caused by breast attenuation  LVEF 70%    Echocardiogram 3/10/22  LVEF: 53%  LVIDd: normal cavity size  RV: normal size and systolic function  MR: none  PASP: inadequate TR envelope for estimation  RVOT: no notching, more parabolic  Other: unable to assess diastolic function, mildly increased wall thickness    Pharm Nuc Stress 7/31/2019: There was a small, mildly severe, fixed myocardial perfusion defect of the apical apical and anterior wall likely due to attenuation from breast tissue  Echo 5/29/22: LVEF 55%  LVIDD 4 1cm  Mild to moderately increased wall thickness  Grade 1 diastology  Mild MR  Normal RV size and systolic function  Diet:  2 g sodium diet  2000 mL fluid restriction    Therapeutic  Torsemide 40mg in AM and 20mg in PM - has only been taking 40mg in AM x 1-2 weeks  Metoprolol succinate 50mg daily    Paroxysmal atrial fibrillation  Rate: metoprolol as above  AC: apixaban 5mg daily  PSVT  Hypertension, controlled  Dyslipidemia  DM type 2 on insulin  HbA1c 6 8 3/9/22; 9 7/20/22  CKD stage 3b, New baseline1 7-1 9, previously 1 4-1 6    Followed by Nephrology  KATRINA, management per pulmonary  Obesity hypoventilation syndrome  History of tobacco use, 20 pack years, quit 25 years ago  Lymphedema    Today's Plan:  Continue torsemide at 40mg once a day  Start jardiance 10mg once a day  BMP in 2 week  2g sodium diet  2L fluid restriction  Daily weights  Patient deferrring PA sensor placement this time      HPI:   Rosenda Calvillo is a 72 yo female with morbid obesity, chronic hypoxic and hypercapnic respiratory failure/OHS, type 2 diabetes mellitus, paroxysmal atrial fibrillation, PSVT, essential hypertension, mixed dyslipidemia, chronic heart failure preserved ejection fraction, chronic kidney disease stage IIIB  She follows with Dr Vahe Tong  She has been chronically anticoagulated with apixaban  Felt to have chronic lower extremity lymphedema  Multiple admissions since 3/2022 after left shoulder replacement  She had 4 admissions then due to volume overload  Last admission at [address] on 6/4-12 when she presented with worsening shortness of breath, weight gain , despite up titration of torsemide 60 mg daily for few days, as an outpatient  She was diuresed and discharged on torsemide 20mg daily  Seen by Kolby Harper and torsemide dose increased to 20mg BID and metoprolol dose decreased to 50mg daily  Patient reports adherence to medications and diet during times of hospitalization  She was 240 lbs last discharge  245 lbs on home scale today  She is short of breath walking short distances  Also with orthopnea, abdominal distension and orthopnea  No palpitations or lightheadedness  Interval History:  9/9/22: Here for follow up  Increased torsemide to 40mg in AM and 20mg in PM on last visit  Nuclear stress test negative for ischemia  Walks without shortness of breath on current level of exertion  Intermittent episodes of confusion? No PND, orthopnea or lower extremity edema  Adherent to medications  Recently cut back torsemide to 40 mg once a day about the last 1-2 weeks because of increasing urination and feeling dehydrated  Weight is stable around 243 lb on home scale        Past Medical History:   Diagnosis Date    Anemia     Asthma     COVID-19     January 2022    GERD (gastroesophageal reflux disease)     Hyperlipidemia     PONV (postoperative nausea and vomiting)     SVT [facility]supraventricular tachycardia) ([facility])        Review of Systems   Constitutional: Negative for chills, fatigue and fever  HENT: Negative for ear pain and sore throat  Eyes: Negative for pain and visual disturbance  Respiratory: Negative for cough and shortness of breath  Cardiovascular: Negative for chest pain, palpitations and leg swelling  Gastrointestinal: Negative for abdominal distention, abdominal pain and vomiting  Genitourinary: Negative for dysuria and hematuria  Musculoskeletal: Negative for arthralgias and back pain  Skin: Negative for color change and rash  Neurological: Negative for seizures, syncope and light-headedness  All other systems reviewed and are negative  Allergies   Allergen Reactions    Penicillins Hives    Moxifloxacin Other (See Comments)     unknown    Percocet [Oxycodone-Acetaminophen] Other (See Comments)     unknown    Zinc Acetate Other (See Comments)     unknown    Asa [Aspirin] GI Intolerance    Indocin [Indomethacin] Other (See Comments)     Made patient "loopy"    Other Other (See Comments)     unknown           Current Outpatient Medications:     acetaminophen (TYLENOL) 325 mg tablet, Take 650 mg by mouth every 6 (six) hours as needed for mild pain  , Disp: , Rfl:     albuterol (PROVENTIL HFA,VENTOLIN HFA) 90 mcg/act inhaler, Inhale 2 puffs every 6 (six) hours as needed for wheezing, Disp: 8 g, Rfl: 4    ammonium lactate (LAC-HYDRIN) 12 % lotion, APPLY TOPICALLY TO AFFECTED AREAS twice a day, Disp: , Rfl:     apixaban (ELIQUIS) 5 mg, Take 1 tablet (5 mg total) by mouth in the morning and 1 tablet (5 mg total) in the evening , Disp: 180 tablet, Rfl: 1    b complex-vitamin C-folic acid (NEPHROCAPS) 1 mg capsule, Take 1 capsule by mouth daily, Disp: 30 capsule, Rfl: 5    clotrimazole-betamethasone (LOTRISONE) 1-0 05 % cream, Apply topically 2 (two) times a day Under both breast, Disp: 45 g, Rfl: 2    docusate sodium (COLACE) 100 mg capsule, Take 100 mg by mouth in the morning, Disp: , Rfl:     Empagliflozin (Jardiance) 10 MG TABS, Take 1 tablet (10 mg total) by mouth every morning, Disp: 30 tablet, Rfl: 2    glucose blood test strip, Use 1 each 2 (two) times a day Use one 2 times daily, Disp: 200 strip, Rfl: 5    insulin lispro (HumaLOG KwikPen) 100 units/mL injection pen, 3 times with meal according to sliding scale - >Blood Glucose 150 - 199: 2 Unit of Insulin, Blood Glucose 200 - 249: 4 Units of Insulin, Blood Glucose 250 - 299: 6 Units of Insulin, Blood Glucose 300 - 349: 8 Units of Insulin, Blood Glucose 350 - 399: 10 Units of In[place]lin,Blood Glucose greater than or equal to 400: 12 Units of Insulin-please contact your physician, Disp: 15 mL, Rfl: 0    Insulin Pen Needle (BD Pen Needle Pilar 2nd Gen) 32G X 4 MM MISC, For use with insulin pen   Pharmacy may dispense brand covered by insurance , Disp: 100 each, Rfl: 0    Insulin Pen Needle (NovoFine Autocover) 30G X 8 MM MISC, Inject under the skin daily, Disp: 100 each, Rfl: 3    Insulin Pen Needle 30G X 8 MM MISC, 2 (two) times a day, Disp: , Rfl:     Lancets (onetouch ultrasoft) lancets, Use once daily, Disp: 100 each, Rfl: 2    metoprolol succinate (TOPROL-XL) 50 mg 24 hr tablet, Take 1 tablet (50 mg total) by mouth daily, Disp: 30 tablet, Rfl: 3    mometasone (ELOCON) 0 1 % cream, Apply topically daily Apply on both hands and left fore arm, Disp: 45 g, Rfl: 0    montelukast (SINGULAIR) 10 mg tablet, Take 10 mg by mouth daily  , Disp: , Rfl:     pregabalin (LYRICA) 100 mg capsule, Take 1 capsule (100 mg total) by mouth 2 (two) times a day, Disp: 180 capsule, Rfl: 1    rosuvastatin (CRESTOR) 10 MG tablet, Take 1 tablet (10 mg total) by mouth daily, Disp: 90 tablet, Rfl: 1    sodium chloride (OCEAN) 0 65 % nasal spray, 1 spray into each nostril every hour as needed for congestion, Disp: , Rfl: 0    torsemide (DEMADEX) 20 mg tablet, Take 2 tablets in the AM, Disp: 90 tablet, Rfl: 1    Toujeo SoloStar 300 units/mL CONCENTRATED U-300 injection pen (1-unit dial), Inject 35 Units under the skin daily at bedtime, Disp: 4 5 mL, Rfl: 3    Trulicity 1 5 EI/7 7YL SOPN, , Disp: , Rfl:     Social History     Socioeconomic History    Marital status: Single     Spouse name: Not on file    Number of children: Not on file    Years of education: Not on file    Highest education level: Not on file   Occupational History    Not on file   Tobacco Use    Smoking status: Former Smoker     Packs/day: 1 00     Years: 20 00     Pack years: 20 00     Types: Cigarettes     Quit date: 1996     Years since quittin 1    Smokeless tobacco: Never Used   Vaping Use    Vaping Use: Never used   Substance and Sexual Activity    Alcohol use: Not Currently     Comment: rarely    Drug use: No    Sexual activity: Not Currently   Other Topics Concern    Not on file   Social History Narrative    Not on file     Social Determinants of Health     Financial Resource Strain: Not on file   Food Insecurity: No Food Insecurity    Worried About Running Out of Food in the Last Year: Never true    Wes of Food in the Last Year: Never true   Transportation Needs: No Transportation Needs    Lack of Transportation (Medical): No    Lack of Transportation (Non-Medical):  No   Physical Activity: Not on file   Stress: Not on file   Social Connections: Not on file   Intimate Partner Violence: Not on file   Housing Stability: Low Risk     Unable to Pay for Housing in the Last Year: No    Number of Places Lived in the Last Year: 2    Unstable Housing in the Last Year: No       Family History   Problem Relation Age of Onset    Heart disease Mother     Emphysema Maternal Grandmother     Heart disease Family        Physical Exam:    Vitals: Blood pressure 112/70, pulse 73, height 5' 2" (1 575 m), weight 112 kg (247 lb), SpO2 97 %, not currently breastfeeding , Body mass index is 45 18 kg/m² ,   Wt Readings from Last 3 Encounters:   22 112 kg (247 lb)   22 112 kg (248 lb)   22 112 kg (248 lb)       Physical Exam  Constitutional:       General: She is not in acute distress  Appearance: Normal appearance  HENT:      Head: Normocephalic and atraumatic  Mouth/Throat:      Mouth: Mucous membranes are moist    Eyes:      Extraocular Movements: Extraocular movements intact  Conjunctiva/sclera: Conjunctivae normal    Cardiovascular:      Rate and Rhythm: Normal rate and regular rhythm  Pulses: Normal pulses  Heart sounds: No murmur heard  No friction rub  No gallop  Comments: Mildly elevated JVP, trace edema  Pulmonary:      Effort: Pulmonary effort is normal  No respiratory distress  Breath sounds: No wheezing, rhonchi or rales  Abdominal:      General: There is no distension  Palpations: Abdomen is soft  Tenderness: There is no abdominal tenderness  There is no guarding  Musculoskeletal:         General: No deformity or signs of injury  Cervical back: Neck supple  Skin:     General: Skin is warm and dry  Capillary Refill: Capillary refill takes less than 2 seconds  Neurological:      General: No focal deficit present  Mental Status: She is alert and oriented to person, place, and time     Psychiatric:         Mood and Affect: Mood normal          Labs & Results:    Lab Results   Component Value Date    WBC 7 02 07/20/2022    HGB 11 8 07/20/2022    HCT 38 4 07/20/2022    MCV 90 07/20/2022     07/20/2022     Lab Results   Component Value Date    SODIUM 140 07/20/2022    K 4 1 07/20/2022    CL 98 07/20/2022    CO2 35 (H) 07/20/2022    BUN 61 (H) 07/20/2022    CREATININE 1 69 (H) 07/20/2022    GLUC 278 (H) 07/11/2022    CALCIUM 9 0 07/20/2022     Lab Results   Component Value Date    NTBNP 1,859 (H) 05/31/2022      Lab Results   Component Value Date    CHOLESTEROL 141 07/20/2022    CHOLESTEROL 104 03/10/2022     Lab Results   Component Value Date    HDL 35 (L) 07/20/2022    HDL 30 (L) 03/10/2022     Lab Results   Component Value Date TRIG 376 (H) 07/20/2022    TRIG 132 03/10/2022     Lab Results   Component Value Date    [place] 106 07/20/2022       EKG personally reviewed by Orion Escamilla MD      Counseling / Coordination of Care  Time spent today 25 minutes  Greater than 50% of total time was spent with the patient and / or family counseling and / or coordination of care  We discussed diagnoses, most recent studies and any changes in treatment    Thank you for the opportunity to participate in the care of this patient      Ofelia Mayorga MD  ADVANCED HEART FAILURE AND MECHANICAL CIRCULATORY SUPPORT  [facility]

## 2022-09-09 NOTE — ASSESSMENT & PLAN NOTE
Stress nuclear stress test unremarkable echocardiogram reviewed  Remains on metoprolol  Remains on Eliquis  Doing fairly well    She looks great

## 2022-09-09 NOTE — PATIENT INSTRUCTIONS
Hypertension( High Blood Pressure ):    Continue Home BP check daily and bring log, if you are not checking consider checking daily    Take your Blood Pressure medicine as advised    Do not take your Blood pressure medicine if systolic Blood Pressure (top number) is less than 100 or heart rate less than 60  Notify you physician  Diabetes    Check your fingerstick Accu-Chek once a day  Please check your fingerstick Accu-Chek different time of the day either at 7:00 a m  or 11:00 a m  for for p m  4 9:00 p m  Anshul Salazar Follow Diabetic 1800 calorie diet for diabetes as advised  If you would sugar less than 80 or more than 300 to please call us on next visit is day  If the sugar is less than 80 follow-up hypoglycemia instructions as advised  Take your diabetic medicine as advised  Cholesterol    Eat low cholesterol diet    Continue taking your cholesterol medicine as advised    Call if any side effects    Lipid Profile and LFT prior to next visit or as advised  ( You should Get periodically to monitor liver side effects)    KNOW YOUR DIABETIC GOAL( HBA1C AND SUGAR), BLOOD PRESSURE TARGET NUMBER AND CHOLESTEROL( LDL, HDL AND TRIGLYCERIDE)   BMI Counseling:       The BMI is above normal  Know Body weight goal    Weigh yourself daily or weekly as per your doctor    Nutrition recommendations include decreasing portion sizes, encouraging healthy choices of fruits and vegetables, decreasing     fast food intake, consuming healthier snacks, limiting drinks that contain sugar, moderation in carbohydrate intake, increasing     intake of lean protein, reducing intake of saturated and trans fat and reducing intake of cholesterol

## 2022-09-09 NOTE — ASSESSMENT & PLAN NOTE
Lab Results   Component Value Date    HGBA1C 9 0 (H) 07/20/2022   Diabetes will get better now that she is going on Jardiance  Of sees not having any low sugar  Last hemoglobin A1c was done on 01/20/2020  Will do around 10/20/2022  The patient will continue Toujeo 35 units daily,   Sugar is much better in the range of around 150 it will get better now that she is going on Jardiance  Watch for any low sugar  Patient also remains on Trulicity weekly    Recommend to stay up-to-date with eye examination

## 2022-09-09 NOTE — ASSESSMENT & PLAN NOTE
Lab Results   Component Value Date    EGFR 30 07/20/2022    EGFR 30 07/11/2022    EGFR 32 06/16/2022    CREATININE 1 69 (H) 07/20/2022    CREATININE 1 69 (H) 07/11/2022    CREATININE 1 62 (H) 06/16/2022   GFR is baseline  Creat is baseline  Recommend periodic blood test for monitoring of BUN and Creat  Avoid Non Steroidal Antiinflammatory such as ibuprofen, aleve etc   Potassium, HCO3 and calcium are wnl and will require periodic blood test   Bone and Mineral disease monitoring as appropriate    All patient with GFR less than 30( CKD 4 or 5 or 6) advised to follow with nephrologist

## 2022-09-09 NOTE — ASSESSMENT & PLAN NOTE
Asthma depending on the weather     Patient remains on singular 10 mg daily and albuterol 2 puff 4 times a day as needed

## 2022-09-09 NOTE — PROGRESS NOTES
Nhan Delgadillo Office Visit Note  22     Chun Gilmore 81 y o  female MRN: [id number]  : 1950    Assessment:     1  Stage 3b chronic kidney disease [facility])  Assessment & Plan:  Lab Results   Component Value Date    EGFR 30 2022    EGFR 30 2022    EGFR 32 2022    CREATININE 1 69 (H) 2022    CREATININE 1 69 (H) 2022    CREATININE 1 62 (H) 2022   GFR is baseline  Creat is baseline  Recommend periodic blood test for monitoring of BUN and Creat  Avoid Non Steroidal Antiinflammatory such as ibuprofen, aleve etc   Potassium, HCO3 and calcium are wnl and will require periodic blood test   Bone and Mineral disease monitoring as appropriate  All patient with GFR less than 30( CKD 4 or 5 or 6) advised to follow with nephrologist       Orders:  -     Comprehensive metabolic panel; Future; Expected date: 2022    2  Rash  Assessment & Plan:  rash in the hands she has can clearing up nicely    Rash under the breast is better  Rash  In the groin fold is better  Pt is using lotrisone       3  Type 2 diabetes mellitus with hyperosmolarity without coma, without long-term current use of insulin [facility])  Assessment & Plan:    Lab Results   Component Value Date    HGBA1C 9 0 (H) 2022   Diabetes will get better now that she is going on Jardiance  Of sees not having any low sugar  Last hemoglobin A1c was done on 2020  Will do around 10/20/2022  The patient will continue Toujeo 35 units daily,   Sugar is much better in the range of around 150 it will get better now that she is going on Jardiance  Watch for any low sugar  Patient also remains on Trulicity weekly  Recommend to stay up-to-date with eye examination    Orders:  -     Comprehensive metabolic panel; Future; Expected date: 2022  -     Hemoglobin A1C; Future; Expected date: 2022  -     Microalbumin / creatinine urine ratio  -     Lipid panel; Future; Expected date: 2022    4   Mild asthma without complication, unspecified whether persistent  Assessment & Plan:  Asthma depending on the weather     Patient remains on singular 10 mg daily and albuterol 2 puff 4 times a day as needed      5  Chronic respiratory failure with hypoxia and hypercapnia (HCC)  Assessment & Plan:  Remains on oxygen 2 L at nighttime    And CPAP in the afternoon  6  KATRINA (obstructive sleep apnea)  Assessment & Plan:  2 L oxygen at night and CPAP in the afternoon      7  Paroxysmal atrial fibrillation [facility])  Assessment & Plan:  Stress nuclear stress test unremarkable echocardiogram reviewed  Remains on metoprolol  Remains on Eliquis  Doing fairly well  She looks great      8  Essential hypertension  Assessment & Plan:  Hypertension well controlled  Remains on metoprolol succinate 50 mg daily, low-salt diet  Orders:  -     Comprehensive metabolic panel; Future; Expected date: 11/01/2022  -     Hemoglobin A1C; Future; Expected date: 11/01/2022  -     Microalbumin / creatinine urine ratio  -     Lipid panel; Future; Expected date: 11/01/2022    9  Chronic diastolic heart failure (HCC)  Assessment & Plan:  Wt Readings from Last 3 Encounters:   09/09/22 112 kg (246 lb)   09/09/22 112 kg (247 lb)   08/26/22 112 kg (248 lb)       08/26/2022:  Nuclear stress test:    Stress ECG: No ST deviation is noted  The ECG was not diagnostic due to pharmacological (vasodilator) stress    Perfusion: There is a left ventricular perfusion defect that is small in size with mild reduction in uptake present in the anterior and apex location(s) that is fixed  The defect appears to be an artifact caused by breast attenuation    Stress Function: Left ventricular function post-stress is normal  Post-stress ejection fraction is 70 %      No ischemia seen  Breast attenuation and left arm in field of image  Pt unable to raise arm due to recent shoulder surgery      March 10, 2022 echocardiogram:       Left Ventricle: Left ventricular cavity size is normal  Wall thickness is mildly increased  The left ventricular ejection fraction is 53% by biplane measurement  Systolic function is low normal  Wall motion is normal  There is borderline concentric hypertrophy    Right Ventricle: Systolic function is low normal  Normal tricuspid annular plane systolic excursion (TAPSE) > 1 7 cm    Study Details: This transthoracic echocardiogram was performed at bedside  This was a routine, inpatient study  Study quality was poor  This was a technically difficult study due to decreased penetration, restricted mobility and poor acoustic windows  A complete 2D, color flow Doppler, spectral Doppler, 2D, color flow Doppler and spectral Doppler transthoracic echocardiogram was performed  The apical, parasternal, subcostal and suprasternal views were obtained  0 6 ml/min of Definity ultrasound enhancing agent was used due to opacify the left ventricle  Allie Marin  Prior TTE study available for comparison  Prior study date: 5/29/2019  No significant changes noted compared to the prior study      Heart failure risk compensated  Remains on Jardiance, torsemide, metoprolol,    Orders:  -     Comprehensive metabolic panel; Future; Expected date: 11/01/2022  -     Hemoglobin A1C; Future; Expected date: 11/01/2022  -     Microalbumin / creatinine urine ratio  -     Lipid panel; Future; Expected date: 11/01/2022    10  Other closed nondisplaced fracture of proximal end of left humerus with routine healing, subsequent encounter  Assessment & Plan:  Left shoulder hip fracture heel  Decreased range of motion  Does home exercise        11  Mixed hyperlipidemia  Assessment & Plan:  Lab Results   Component Value Date    CHOLESTEROL 141 07/20/2022    CHOLESTEROL 104 03/10/2022     Lab Results   Component Value Date    HDL 35 (L) 07/20/2022    HDL 30 (L) 03/10/2022     Lab Results   Component Value Date    TRIG 376 (H) 07/20/2022    TRIG 132 03/10/2022     Lab Results   Component Value Date Padmaja 106 07/20/2022     Lipid profile to the target  Patient remains on rosuvastatin 10 mg daily    Orders:  -     Comprehensive metabolic panel; Future; Expected date: 11/01/2022  -     Hemoglobin A1C; Future; Expected date: 11/01/2022  -     Microalbumin / creatinine urine ratio  -     Lipid panel; Future; Expected date: 11/01/2022          Discussion Summary and Plan: Today's care plan and medications were reviewed with patient in detail and all their questions answered to their satisfaction  In summary Pina Estrada is doing great  I am very happy for her    Chief Complaint   Patient presents with    Follow-up    Hypertension    Hyperlipidemia    Diabetes    CKD III     CKD 4 GFR around 30    Obesity    GERD      Subjective:  Patient is here for chronic disease management  Cardia:  Patient is doing much better  Patient was seen by cardiologist today  Told to have a unremarkable stress test of and normal pumping  Of how her breathing is better  She is not on oxygen  She could walk from car to my office without any problem  Denies any chest pain of swelling of the leg  Patient is being started on Zyrtec instead 10 mg daily  Patient will be going for the blood test in next 1 week  Hard weight today's 246 her dry weight is in the range of same  08/26/2022:  Nuclear stress test:    Stress ECG: No ST deviation is noted  The ECG was not diagnostic due to pharmacological (vasodilator) stress    Perfusion: There is a left ventricular perfusion defect that is small in size with mild reduction in uptake present in the anterior and apex location(s) that is fixed  The defect appears to be an artifact caused by breast attenuation    Stress Function: Left ventricular function post-stress is normal  Post-stress ejection fraction is 70 %      No ischemia seen  Breast attenuation and left arm in field of image  Pt unable to raise arm due to recent shoulder surgery            Recurrent CHF: Hospitalization on 06/04/2022 discharged on 06/12/2022  ProBNP 531 1859  EKG normal sinus rhythm  Dry weight 244 lb  Ejection fraction 53%  Wt Readings from Last 3 Encounters:  08/19/22 112 kg (248 lb)  07/20/22 112 kg (246 lb 4 oz)  07/15/22 110 kg (242 lb)    08/26/2022:  Nuclear stress test:    Stress ECG: No ST deviation is noted  The ECG was not diagnostic due to pharmacological (vasodilator) stress    Perfusion: There is a left ventricular perfusion defect that is small in size with mild reduction in uptake present in the anterior and apex location(s) that is fixed  The defect appears to be an artifact caused by breast attenuation    Stress Function: Left ventricular function post-stress is normal  Post-stress ejection fraction is 70 %      No ischemia seen  Breast attenuation and left arm in field of image  Pt unable to raise arm due to recent shoulder surgery  March 10, 2022 echocardiogram:       Left Ventricle: Left ventricular cavity size is normal  Wall thickness is mildly increased  The left ventricular ejection fraction is 53% by biplane measurement  Systolic function is low normal  Wall motion is normal  There is borderline concentric hypertrophy    Right Ventricle: Systolic function is low normal  Normal tricuspid annular plane systolic excursion (TAPSE) > 1 7 cm    Study Details: This transthoracic echocardiogram was performed at bedside  This was a routine, inpatient study  Study quality was poor  This was a technically difficult study due to decreased penetration, restricted mobility and poor acoustic windows  A complete 2D, color flow Doppler, spectral Doppler, 2D, color flow Doppler and spectral Doppler transthoracic echocardiogram was performed  The apical, parasternal, subcostal and suprasternal views were obtained  0 6 ml/min of Definity ultrasound enhancing agent was used due to opacify the left ventricle  [facility]  Prior TTE study available for comparison   Prior study date: 2019  No significant changes noted compared to the prior study        Thrombophlebitis right forearm is status post Keflex    CKD lelve 3/4; renal function acceptable, see bmp BUN 61 Creat 1 69, pth 80, gfr 30 k ok hb 11 8      Obstructive sleep apnea:  Under care of pulmonologist currently on BiPAP 12 hour 6  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:  Continue BiPAP    PFT was done on May 18, 2022  Forced vital capacity was 0 99 L or 36% of predicted, FEV1 was 0 72 L or 34% of predicted and obstruction ratio was 73%  Flow volume loop was restricted  Result Date: 2022  Narrative: Pulmonary Function Test Report Sd Enriquez 73 y o  female MRN: [id number] Date of Testin2022 Date of Interpretation: 2022 Requesting Physician: Dr Clayton Zurita Reason for Testing: SOB Reference set for interpretation:  Procedure: Testing was completed bedside  The patient demonstrated good effort and cooperation  The results of this test meet ATS standards for acceptability and repeatability  Data set appears appropriate for interpretation  Post bronchodilator testing performed after the administration of 2 5mg albuterol in 3cc normal saline administered via nebulizer per bronchodilator protocol  Results: FEV1/FVC Ratio: 73 % Forced Vital Capacity: 0 99 L 36 % predicted FEV1: 0 72 L 34 % predicted Interpretation: · Spirometry suggests abnormal lung volumes  Recommend checking lung volumes for further evaluation  · No significant response to the administration to bronchodilator per ATS standards · Flow volume loop is restricted  Diabetes last hemoglobin A1c worsen 9 0  Recently we increased her Toujeo 30 units daily from 25 units however her home sugar seems to be running high in the range of 150-200  Patient used to be on much higher dose of Toujeo in the remote past   Will increase Toujeo 35 units  Recommend diet exercise lifestyle modification and weight loss    Patient prefers not to see endocrinologist, dietitian, or diabetic nurse at this time  Recommend to write down sugar and check 3 times a day and bring the report with her next visit  Due for hemoglobin A1c in 3 months from the last time  Patient hemoglobin A1c was 9 0    PAF  Rate controlled rate rhythm regular tolerating current regimen with metoprolol succinate 50 mg daily and Eliquis 5 mg twice a day        Acute respiratory failure with hypoxia resolved as of 06/10/2022  The mildly hypoxic on admission worsen oxygen tolerated nighttime  Able to saturate appropriately on 06/06 a m  Without oxygen  Back to O2 supply at nighttime  The patient to use albuterol p r n       Asthma continue bronchodilator    Obesity BMI 45 point 54 last time  Lower extremity edema:  Mild weight 250 lb here discharge weight was 244    Hypertension  Hypertension well controlled it was running somewhat tight metoprolol tartrate 50 mg twice a day decreased to the mild Toprol succinate 50 mg once a day    Hyperlipidemia  Remains on rosuvastatin due for lipid profile and CMP in a month    Status post left shoulder total replacement see did home exercise he does not think she needs to go for the how occupational therapy    Recent UTI  Resolved  Lung nodule:  CT scan done on 8 report awaited     Anemia:  Improved 11 8 at this time  Follow-up iron studies, iron 52, iron sat 20% ferritin 236, TIBC 261  B12, folate adequate  Prior immunofixation without any evidence of monoclonal gammopathy    Lower leg edema:  Mild to moderate  Recent chronic respiratory failure with CO2 retention:  Patient does have a hypoventilation syndrome    Good functional capacity uses CPAP during daytime 2-3 hours    07/20/2022:  Creatinine 1 69 BUN 61 calcium 9 0 LFT normal GFR 30 hemoglobin A1c 9 0 cholesterol 141 LDL 31 HDL 35 hemoglobin 11 8 rest CBC normal, urinalysis unremarkable, PTH 80 5 vitamin-D 23 9 phosphorus 4 6 magnesium 1 9 urine for protein/creatinine ratio 0 19    06/16/2022:  GFR 32 creatinine 1 62 BUN 77 hemoglobin 10 5 rest of the CBC normal  05/13/2022:  Creatinine 1 49 BUN 36 rest of the LFT unremarkable, INR 1 18, blood sugar 282 hemoglobin 9 2  06/12/2022: BMP normal except BUN 97 and creatinine 1 93, CO2 35  06/11/2022: BMP normal except BUN 98 creatinine 1 944, CO2 36, hemoglobin 10 2 and platelet 185  07/14/6465:  BUN 91 creatinine 2 06 CO2 35    07/08/2022:  CT scan of the chest:   Nodules as described above  There is interval enlargement of one of the groundglass nodules at the right lower lobe as well as interval development of several groundglass nodules  These are likely of infectious or inflammatory etiology    Recommend   follow-up in 3 months to document stability or interval change      03/29/2022:  CT scan of chest abdomen and pelvis:  Decreased bilateral effusion, pulmonary edema as described, passive atelectasis in the lower lobes, multiple lung nodule as described follow-up CT scan in 3 months recommended, small fat containing periumbilical hernia, degenerative changes in the thoracolumbar area, mild level scoliosis,  03/29/2022:  Venous Doppler right upper extremity shows no evidence of thrombus in the internal jugular subclavian or brachiocephalic vein, left upper extremity evidence of superficial thrombophlebitis noted in the cephalic vein no evidence of acute or deep vein thrombosis otherwise  05/13/2022:  Chest x-ray mild pulmonary vascular congestion with small bilateral effusion  05/16/2022:  Chest x-ray mild cardiomegaly with mild was increased vascular congestion increase bilateral pleural effusion  05/31/2022:  Chest x-ray:  Improving aeration compatible with improving CHF no acute infiltrate    05/16/2022:  CT scan of the brain no acute intracranial abnormality new left mastoid effusion       05/27/2022:  Pulmonary function test:FEV1/FVC Ratio: 73 % Forced Vital Capacity: 0 99 L 36 % predicted FEV1: 0 72 L 34 % predicted Interpretation: · Spirometry suggests abnormal lung volumes  Recommend checking lung volumes for further evaluation  · No significant response to the administration to bronchodilator per ATS standards · Flow volume loop is restricted  06/05/2022:  Chest x-ray no acute cardiopulmonary disease      07/20/2022:  Creatinine 1 69 BUN 61 calcium 9 0 LFT normal GFR 30 hemoglobin A1c 9 0 cholesterol 141 LDL 31 HDL 35 hemoglobin 11 8 rest CBC normal, urinalysis unremarkable, PTH 80 5 vitamin-D 23 9 phosphorus 4 6 magnesium 1 9 urine for protein/creatinine ratio 0 19            The following portions of the patient's history were reviewed and updated as appropriate: allergies, current medications, past family history, past medical history, past social history, past surgical history and problem list     Review of Systems   Constitutional: Negative for fatigue and fever  Respiratory: Negative for shortness of breath  Cardiovascular: Negative for palpitations  Gastrointestinal: Negative for constipation  Musculoskeletal: Negative for joint swelling  Neurological: Negative for weakness and headaches  Psychiatric/Behavioral: Negative        oral sees doing much better    Historical Information   Patient Active Problem List   Diagnosis    Asthma    Morbid obesity with BMI of 45 0-49 9, adult ([facility] [address] )    Lower leg edema    Paroxysmal atrial fibrillation (HCC)    Mixed hyperlipidemia    Anemia    Essential hypertension    Humeral head fracture    PONV (postoperative nausea and vomiting)    Diabetes mellitus, type 2 (HCC)    Gastroesophageal reflux disease    Nephrolithiasis    Arthritis    S/P total knee replacement, right    On continuous oral anticoagulation - eliquis    Closed fracture of proximal end of left humerus with routine healing    Status post reverse total replacement of left shoulder    CKD (chronic kidney disease)    Peripheral venous insufficiency    Post-herpetic polyneuropathy    Raynaud's disease    Lung nodule < 6cm on CT    Chronic diastolic heart failure (HCC)    Pulmonary nodules    Lump of left breast    Pressure injury of deep tissue of sacral region    Morbid obesity (HCC)    Obesity hypoventilation syndrome (HCC)    KATRINA (obstructive sleep apnea)    Elevated serum creatinine    Thrombophlebitis of arm, right    Chronic respiratory failure with hypoxia and hypercapnia (HCC)    Platelets decreased (HCC)    Cellulitis of left upper extremity    PSVT (paroxysmal supraventricular tachycardia) (HCC)    Stage 3b chronic kidney disease (HCC)    Rash     Past Medical History:   Diagnosis Date    Anemia     Asthma     COVID-19 January 2022    GERD (gastroesophageal reflux disease)     Hyperlipidemia     PONV (postoperative nausea and vomiting)     SVT (supraventricular tachycardia) (HCC)      Past Surgical History:   Procedure Laterality Date    APPENDECTOMY      CYSTOSCOPY W/ LASER LITHOTRIPSY Left 7/12/2016    Procedure: CYSTOSCOPY URETEROSCOPY WITH LITHOTRIPSY HOLMIUM LASER, RETROGRADE PYELOGRAM AND INSERTION STENT URETERAL;  Surgeon: Sylvain Tijerina MD;  Location: [address] [place] [address];  Service:     DILATION AND CURETTAGE OF UTERUS      IR THORACENTESIS  3/18/2022    JOINT REPLACEMENT      right knee    KNEE ARTHROPLASTY Right     [place] CYSTOURETHROSCOPY,URETER CATHETER Left 6/29/2016    Procedure: CYSTOSCOPY RETROGRADE PYELOGRAM WITH INSERTION STENT URETERAL, left;  Surgeon: Sylvain Tijerina MD;  Location: [facility];  Service: Urology    NY RECONSTR TOTAL SHOULDER IMPLANT Left 3/1/2022    Procedure: ARTHROPLASTY SHOULDER REVERSE;  Surgeon: Paula Mcdowell MD;  Location: [facility];  Service: Orthopedics    SHOULDER ARTHROTOMY Left      Social History     Substance and Sexual Activity   Alcohol Use Not Currently    Comment: rarely     Social History     Substance and Sexual Activity   Drug Use No     Social History     Tobacco Use   Smoking Status Former Smoker    Packs/day: 1 00    Years: 20 00    Pack years: 20 00    Types: Cigarettes    Quit date: 1996    Years since quittin 1   Smokeless Tobacco Never Used     Family History   Problem Relation Age of Onset    Heart disease Mother     Emphysema Maternal Grandmother     Heart disease Family      Health Maintenance Due   Topic    Hepatitis C Screening     Pneumococcal Vaccine: 65+ Years (1 - PCV)    DM Eye Exam     Breast Cancer Screening: Mammogram     Osteoporosis Screening     COVID-19 Vaccine (2 - Moderna series)    Influenza Vaccine (1)      Meds/Allergies       Current Outpatient Medications:     acetaminophen (TYLENOL) 325 mg tablet, Take 650 mg by mouth every 6 (six) hours as needed for mild pain  , Disp: , Rfl:     albuterol (PROVENTIL HFA,VENTOLIN HFA) 90 mcg/act inhaler, Inhale 2 puffs every 6 (six) hours as needed for wheezing, Disp: 8 g, Rfl: 4    ammonium lactate (LAC-HYDRIN) 12 % lotion, APPLY TOPICALLY TO AFFECTED AREAS twice a day, Disp: , Rfl:     apixaban (ELIQUIS) 5 mg, Take 1 tablet (5 mg total) by mouth in the morning and 1 tablet (5 mg total) in the evening , Disp: 180 tablet, Rfl: 1    b complex-vitamin C-folic acid (NEPHROCAPS) 1 mg capsule, Take 1 capsule by mouth daily, Disp: 30 capsule, Rfl: 5    clotrimazole-betamethasone (LOTRISONE) 1-0 05 % cream, Apply topically 2 (two) times a day Under both breast, Disp: 45 g, Rfl: 2    docusate sodium (COLACE) 100 mg capsule, Take 100 mg by mouth in the morning, Disp: , Rfl:     Empagliflozin (Jardiance) 10 MG TABS, Take 1 tablet (10 mg total) by mouth every morning, Disp: 30 tablet, Rfl: 2    glucose blood test strip, Use 1 each 2 (two) times a day Use one 2 times daily, Disp: 200 strip, Rfl: 5    insulin lispro (HumaLOG KwikPen) 100 units/mL injection pen, 3 times with meal according to sliding scale - >Blood Glucose 150 - 199: 2 Unit of Insulin, Blood Glucose 200 - 249: 4 Units of Insulin, Blood Glucose 250 - 299: 6 Units of Insulin, Blood Glucose 300 - 349: 8 Units of Insulin, Blood Glucose 350 - 399: 10 Units of Inuslin,Blood Glucose greater than or equal to 400: 12 Units of Insulin-please contact your physician, Disp: 15 mL, Rfl: 0    Insulin Pen Needle (BD Pen Needle Pilar 2nd Gen) 32G X 4 MM MISC, For use with insulin pen  Pharmacy may dispense brand covered by insurance , Disp: 100 each, Rfl: 0    Insulin Pen Needle (NovoFine Autocover) 30G X 8 MM MISC, Inject under the skin daily, Disp: 100 each, Rfl: 3    Insulin Pen Needle 30G X 8 MM MISC, 2 (two) times a day, Disp: , Rfl:     Lancets (onetouch ultrasoft) lancets, Use once daily, Disp: 100 each, Rfl: 2    metoprolol succinate (TOPROL-XL) 50 mg 24 hr tablet, Take 1 tablet (50 mg total) by mouth daily, Disp: 30 tablet, Rfl: 3    mometasone (ELOCON) 0 1 % cream, Apply topically daily Apply on both hands and left fore arm, Disp: 45 g, Rfl: 0    montelukast (SINGULAIR) 10 mg tablet, Take 10 mg by mouth daily  , Disp: , Rfl:     pregabalin (LYRICA) 100 mg capsule, Take 1 capsule (100 mg total) by mouth 2 (two) times a day, Disp: 180 capsule, Rfl: 1    rosuvastatin (CRESTOR) 10 MG tablet, Take 1 tablet (10 mg total) by mouth daily, Disp: 90 tablet, Rfl: 1    sodium chloride (OCEAN) 0 65 % nasal spray, 1 spray into each nostril every hour as needed for congestion, Disp: , Rfl: 0    torsemide (DEMADEX) 20 mg tablet, Take 2 tablets in the AM, Disp: 90 tablet, Rfl: 1    Toujeo SoloStar 300 units/mL CONCENTRATED U-300 injection pen (1-unit dial), Inject 35 Units under the skin daily at bedtime, Disp: 4 5 mL, Rfl: 3    Trulicity 1 5 VZ/2 7TW SOPN, , Disp: , Rfl:       Objective:    Vitals:   /80   Pulse 62   Ht 5' 2" (1 575 m)   Wt 112 kg (246 lb)   SpO2 95%   BMI 44 99 kg/m²   Body mass index is 44 99 kg/m²  Vitals:    09/09/22 1537   Weight: 112 kg (246 lb)   Falls Plan of Care: Balance, strength, and gait training instructions were provided  Physical Exam  Vitals reviewed  Exam conducted with a chaperone present  Constitutional:       General: She is not in acute distress  Appearance: She is well-developed  She is obese  She is not ill-appearing, toxic-appearing or diaphoretic  HENT:      Head: Normocephalic and atraumatic  Right Ear: External ear normal       Left Ear: External ear normal       Nose: No congestion or rhinorrhea  Eyes:      General: Lids are normal          Right eye: No discharge  Left eye: No discharge  Conjunctiva/sclera: Conjunctivae normal    Neck:      Thyroid: No thyroid mass, thyromegaly or thyroid tenderness  Vascular: No carotid bruit or JVD  Trachea: Trachea normal    Cardiovascular:      Rate and Rhythm: Rhythm irregular  Heart sounds: Normal heart sounds  No murmur heard  Pulmonary:      Effort: No respiratory distress  Breath sounds: Normal breath sounds  No wheezing, rhonchi or rales  Abdominal:      General: Bowel sounds are normal  There is no distension  Palpations: There is no mass  Tenderness: There is no abdominal tenderness  There is no rebound  Musculoskeletal:      Right lower le+ Pitting Edema present  Left lower le+ Pitting Edema present  Lymphadenopathy:      Cervical: No cervical adenopathy  Skin:     General: Skin is warm  Coloration: Skin is not jaundiced or pale  Findings: No rash  Neurological:      Mental Status: She is alert  Coordination: Coordination normal       Gait: Gait abnormal    Psychiatric:         Mood and Affect: Mood normal          Behavior: Behavior normal          Thought Content:  Thought content normal          Judgment: Judgment normal          Lab Review   Hospital Outpatient Visit on 2022   Component Date Value Ref Range Status    Baseline HR 2022 64  bpm Final    Baseline BP 2022 128/84  mmHg Final    O2 sat rest 2022 97  % Final    Stress peak HR 08/26/2022 114  bpm Final    Post peak BP 08/26/2022 118  mmHg Final    Rate Pressure Product 08/26/2022 13,452 0   Final    O2 sat peak 08/26/2022 100  % Final    Recovery HR 08/26/2022 86  bpm Final    Recovery BP 08/26/2022 126/84  mmHg Final    O2 sat recovery 08/26/2022 97  % Final    Max HR 08/26/2022 114  bpm Final    Exercise duration (min) 08/26/2022 3  min Final    Exercise duration (sec) 08/26/2022 0  sec Final    Angina Index 08/26/2022 1   Final    Rest Nuclear Isotope Dose 08/26/2022 16 50  mCi Final    Stress Nuclear Isotope Dose 08/26/2022 46 60  mCi Final    Wilson Treadmill Score 08/26/2022 -1   Final    Base ST Depresion (mm) 08/26/2022 0  mm Final    ST Depression (mm) 08/26/2022 0  mm Final    EF (%) 08/26/2022 70  % Final    Protocol Name 08/26/2022 BENNY SIT   Final    Time In Exercise Phase 08/26/2022 00:03:00   Final    MAX  SYSTOLIC BP 04/51/5534 403  mmHg Final    Max Diastolic Bp 64/85/5167 84  mmHg Final    Max Heart Rate 08/26/2022 114  BPM Final    Max Predicted Heart Rate 08/26/2022 150  BPM Final    Reason for Termination 08/26/2022 Protocol Complete   Final    Test Indication 08/26/2022 HTN, HF   Final    Target Hr Formular 08/26/2022 (220 - Age)*100%   Final   Lab on 07/20/2022   Component Date Value Ref Range Status    Sodium 07/20/2022 140  135 - 147 mmol/L Final    Potassium 07/20/2022 4 1  3 5 - 5 3 mmol/L Final    Chloride 07/20/2022 98  96 - 108 mmol/L Final    CO2 07/20/2022 35 (A) 21 - 32 mmol/L Final    ANION GAP 07/20/2022 7  4 - 13 mmol/L Final    BUN 07/20/2022 61 (A) 5 - 25 mg/dL Final    Creatinine 07/20/2022 1 69 (A) 0 60 - 1 30 mg/dL Final    Standardized to IDMS reference method    Glucose, Fasting 07/20/2022 228 (A) 65 - 99 mg/dL Final    Specimen collection should occur prior to Sulfasalazine administration due to the potential for falsely depressed results   Specimen collection should occur prior to Sulfapyridine administration due to the potential for falsely elevated results   Calcium 07/20/2022 9 0  8 4 - 10 2 mg/dL Final    AST 07/20/2022 14  13 - 39 U/L Final    Specimen collection should occur prior to Sulfasalazine administration due to the potential for falsely depressed results   ALT 07/20/2022 14  7 - 52 U/L Final    Specimen collection should occur prior to Sulfasalazine administration due to the potential for falsely depressed results   Alkaline Phosphatase 07/20/2022 57  34 - 104 U/L Final    Total Protein 07/20/2022 7 5  6 4 - 8 4 g/dL Final    Albumin 07/20/2022 3 8  3 5 - 5 0 g/dL Final    Total Bilirubin 07/20/2022 0 35  0 20 - 1 00 mg/dL Final    eGFR 07/20/2022 30  ml/min/1 73sq m Final    Hemoglobin A1C 07/20/2022 9 0 (A) Normal 3 8-5 6%; PreDiabetic 5 7-6 4%; Diabetic >=6 5%; Glycemic control for adults with diabetes <7 0% % Final    EAG 07/20/2022 212  mg/dl Final    Cholesterol 07/20/2022 141  See Comment mg/dL Final    Cholesterol:         Pediatric <18 Years        Desirable          <170 mg/dL      Borderline High    170-199 mg/dL      High               >=200 mg/dL        Adult >=18 Years            Desirable         <200 mg/dL      Borderline High   200-239 mg/dL      High              >239 mg/dL      Triglycerides 07/20/2022 376 (A) See Comment mg/dL Final    Triglyceride:     0-9Y            <75mg/dL     10Y-17Y         <90 mg/dL       >=18Y     Normal          <150 mg/dL     Borderline High 150-199 mg/dL     High            200-499 mg/dL        Very High       >499 mg/dL    Specimen collection should occur prior to N-Acetylcysteine or Metamizole administration due to the potential for falsely depressed results      HDL, Direct 07/20/2022 35 (A) >=50 mg/dL Final    LDL Calculated 07/20/2022 31  0 - 100 mg/dL Final    LDL Cholesterol:     Optimal           <100 mg/dl     Near Optimal      100-129 mg/dl     Above Optimal       Borderline High 130-159 mg/dl       High            160-189 mg/dl       Very High       >189 mg/dl         This screening LDL is a calculated result  It does not have the accuracy of the Direct Measured LDL in the monitoring of patients with hyperlipidemia and/or statin therapy  Direct Measure LDL ([id number]) must be ordered separately in these patients      Non-HDL-Chol (CHOL-HDL) 07/20/2022 106  mg/dl Final    WBC 07/20/2022 7 02  4 31 - 10 16 Thousand/uL Final    RBC 07/20/2022 4 25  3 81 - 5 12 Million/uL Final    Hemoglobin 07/20/2022 11 8  11 5 - 15 4 g/dL Final    Hematocrit 07/20/2022 38 4  34 8 - 46 1 % Final    MCV 07/20/2022 90  82 - 98 fL Final    MCH 07/20/2022 27 8  26 8 - 34 3 pg Final    MCHC 07/20/2022 30 7 (A) 31 4 - 37 4 g/dL Final    RDW 07/20/2022 14 6  11 6 - 15 1 % Final    Platelets 48/92/5163 156  149 - 390 Thousands/uL Final    MPV 07/20/2022 10 7  8 9 - 12 7 fL Final    Color, UA 07/20/2022 Light Yellow   Final    Clarity, UA 07/20/2022 Clear   Final    Specific Gravity, UA 07/20/2022 1 012  1 003 - 1 030 Final    pH, UA 07/20/2022 5 0  4 5, 5 0, 5 5, 6 0, 6 5, 7 0, 7 5, 8 0 Final    Leukocytes, UA 07/20/2022 Large (A) Negative Final    Nitrite, UA 07/20/2022 Negative  Negative Final    Protein, UA 07/20/2022 Negative  Negative mg/dl Final    Glucose, UA 07/20/2022 Negative  Negative mg/dl Final    Ketones, UA 07/20/2022 Negative  Negative mg/dl Final    Urobilinogen, UA 07/20/2022 <2 0  <2 0 mg/dl mg/dl Final    Bilirubin, UA 07/20/2022 Negative  Negative Final    Occult Blood, UA 07/20/2022 Trace (A) Negative Final    RBC, UA 07/20/2022 1-2  None Seen, 1-2 /hpf Final    WBC, UA 07/20/2022 20-30 (A) None Seen, 1-2 /hpf Final    Epithelial Cells 07/20/2022 Occasional  None Seen, Occasional /hpf Final    Bacteria, UA 07/20/2022 None Seen  None Seen, Occasional /hpf Final    Creatinine, Ur 07/20/2022 84 7  mg/dL Final    Protein Urine Random 07/20/2022 16  mg/dL Final    Prot/Creat Ratio, Ur 07/20/2022 0 19 (A) 0 00 - 0 10 Final  Magnesium 07/20/2022 1 9  1 9 - 2 7 mg/dL Final    PTH 07/20/2022 80 5 (A) 18 4 - 80 1 pg/mL Final    Vit D, 25-Hydroxy 07/20/2022 23 9 (A) 30 0 - 100 0 ng/mL Final    Phosphorus 07/20/2022 4 6 (A) 2 3 - 4 1 mg/dL Final   Lab on 07/11/2022   Component Date Value Ref Range Status    Sodium 07/11/2022 136  136 - 145 mmol/L Final    Potassium 07/11/2022 4 7  3 5 - 5 3 mmol/L Final    Slightly Hemolyzed; Results May be Affected    Chloride 07/11/2022 101  100 - 108 mmol/L Final    CO2 07/11/2022 31  21 - 32 mmol/L Final    ANION GAP 07/11/2022 4  4 - 13 mmol/L Final    BUN 07/11/2022 57 (A) 5 - 25 mg/dL Final    Creatinine 07/11/2022 1 69 (A) 0 60 - 1 30 mg/dL Final    Standardized to IDMS reference method    Glucose 07/11/2022 278 (A) 65 - 140 mg/dL Final    If the patient is fasting, the ADA then defines impaired fasting glucose as > 100 mg/dL and diabetes as > or equal to 123 mg/dL  Specimen collection should occur prior to Sulfasalazine administration due to the potential for falsely depressed results  Specimen collection should occur prior to Sulfapyridine administration due to the potential for falsely elevated results   Calcium 07/11/2022 9 3  8 3 - 10 1 mg/dL Final    eGFR 07/11/2022 30  ml/min/1 73sq m Final    WBC 07/11/2022 7 86  4 31 - 10 16 Thousand/uL Final    RBC 07/11/2022 4 10  3 81 - 5 12 Million/uL Final    Hemoglobin 07/11/2022 11 3 (A) 11 5 - 15 4 g/dL Final    Hematocrit 07/11/2022 37 0  34 8 - 46 1 % Final    MCV 07/11/2022 90  82 - 98 fL Final    MCH 07/11/2022 27 6  26 8 - 34 3 pg Final    MCHC 07/11/2022 30 5 (A) 31 4 - 37 4 g/dL Final    RDW 07/11/2022 14 6  11 6 - 15 1 % Final    Platelets 92/78/6241 137 (A) 149 - 390 Thousands/uL Final    MPV 07/11/2022 12 5  8 9 - 12 7 fL Final   Office Visit on 07/11/2022   Component Date Value Ref Range Status    Creatinine, Ur 07/20/2022 86 8  mg/dL Final    Microalbum  ,U,Random 07/20/2022 37 3 (A) 0 0 - 20 0 mg/L Final  Microalb Creat Ratio 07/20/2022 43 (A) 0 - 30 mg/g creatinine Final         Patient Instructions   Hypertension( High Blood Pressure ):    Continue Home BP check daily and bring log, if you are not checking consider checking daily    Take your Blood Pressure medicine as advised    Do not take your Blood pressure medicine if systolic Blood Pressure (top number) is less than 100 or heart rate less than 60  Notify you physician  Diabetes    Check your fingerstick Accu-Chek once a day  Please check your fingerstick Accu-Chek different time of the day either at 7:00 a m  or 11:00 a m  for for p m  4 9:00 p m  Yoko Jenkins Follow Diabetic 1800 calorie diet for diabetes as advised  If you would sugar less than 80 or more than 300 to please call us on next visit is day  If the sugar is less than 80 follow-up hypoglycemia instructions as advised  Take your diabetic medicine as advised  Cholesterol    Eat low cholesterol diet    Continue taking your cholesterol medicine as advised    Call if any side effects    Lipid Profile and LFT prior to next visit or as advised  ( You should Get periodically to monitor liver side effects)    KNOW YOUR DIABETIC GOAL( HBA1C AND SUGAR), BLOOD PRESSURE TARGET NUMBER AND CHOLESTEROL( LDL, HDL AND TRIGLYCERIDE)   BMI Counseling: The BMI is above normal  Know Body weight goal    Weigh yourself daily or weekly as per your doctor    Nutrition recommendations include decreasing portion sizes, encouraging healthy choices of fruits and vegetables, decreasing     fast food intake, consuming healthier snacks, limiting drinks that contain sugar, moderation in carbohydrate intake, increasing     intake of lean protein, reducing intake of saturated and trans fat and reducing intake of cholesterol            Dr Ollie Mejia MD  [facility] [place]       "This note has been constructed using a voice recognition system  Therefore there may be syntax, spelling, and/or grammatical errors   Please call if you have any questions  "

## 2022-09-09 NOTE — ASSESSMENT & PLAN NOTE
rash in the hands she has can clearing up nicely    Rash under the breast is better  Rash  In the groin fold is better  Pt is using lotrisone

## 2022-09-09 NOTE — ASSESSMENT & PLAN NOTE
Wt Readings from Last 3 Encounters:   09/09/22 112 kg (246 lb)   09/09/22 112 kg (247 lb)   08/26/22 112 kg (248 lb)       08/26/2022:  Nuclear stress test:    Stress ECG: No ST deviation is noted  The ECG was not diagnostic due to pharmacological (vasodilator) stress    Perfusion: There is a left ventricular perfusion defect that is small in size with mild reduction in uptake present in the anterior and apex location(s) that is fixed  The defect appears to be an artifact caused by breast attenuation    Stress Function: Left ventricular function post-stress is normal  Post-stress ejection fraction is 70 %      No ischemia seen  Breast attenuation and left arm in field of image  Pt unable to raise arm due to recent shoulder surgery  March 10, 2022 echocardiogram:       Left Ventricle: Left ventricular cavity size is normal  Wall thickness is mildly increased  The left ventricular ejection fraction is 53% by biplane measurement  Systolic function is low normal  Wall motion is normal  There is borderline concentric hypertrophy    Right Ventricle: Systolic function is low normal  Normal tricuspid annular plane systolic excursion (TAPSE) > 1 7 cm    Study Details: This transthoracic echocardiogram was performed at bedside  This was a routine, inpatient study  Study quality was poor  This was a technically difficult study due to decreased penetration, restricted mobility and poor acoustic windows  A complete 2D, color flow Doppler, spectral Doppler, 2D, color flow Doppler and spectral Doppler transthoracic echocardiogram was performed  The apical, parasternal, subcostal and suprasternal views were obtained  0 6 ml/min of Definity ultrasound enhancing agent was used due to opacify the left ventricle  Bree Avila  Prior TTE study available for comparison  Prior study date: 5/29/2019  No significant changes noted compared to the prior study      Heart failure risk compensated    Remains on Jardiance, torsemide, metoprolol,

## 2022-09-09 NOTE — PATIENT INSTRUCTIONS
Continue torsemide at 40mg once a day  Start jardiance 10mg once a day  BMP in 2 week  2g sodium diet  2L fluid restriction  Daily weights

## 2022-09-12 ENCOUNTER — PATIENT OUTREACH (OUTPATIENT)
Dept: INTERNAL MEDICINE CLINIC | Facility: CLINIC | Age: 72
End: 2022-09-12

## 2022-09-12 NOTE — PROGRESS NOTES
Chart was reviewed  Bundle Episode for CHF ended on 9/10/22  I called patient and confirmed she has her Albuterol inhaler  She declines the need for any additional services or follow up at this time       This Bundle Episode was closed and this RNCM removed

## 2022-09-21 DIAGNOSIS — E11.00 TYPE 2 DIABETES MELLITUS WITH HYPEROSMOLARITY WITHOUT COMA, WITHOUT LONG-TERM CURRENT USE OF INSULIN (HCC): ICD-10-CM

## 2022-09-21 RX ORDER — INSULIN GLARGINE 300 U/ML
25 INJECTION, SOLUTION SUBCUTANEOUS
Qty: 4.5 ML | Refills: 3 | Status: SHIPPED | OUTPATIENT
Start: 2022-09-21

## 2022-09-21 RX ORDER — INSULIN GLARGINE 300 U/ML
INJECTION, SOLUTION SUBCUTANEOUS
Qty: 4.5 ML | Refills: 3 | Status: SHIPPED | OUTPATIENT
Start: 2022-09-21 | End: 2022-09-21 | Stop reason: SDUPTHER

## 2022-10-12 NOTE — ASSESSMENT & PLAN NOTE
Health Maintenance Due   Topic Date Due   • Hepatitis B Vaccine (1 of 3 - 3-dose series) 1952   • Diabetes Eye Exam  Never done   • Diabetes Foot Exam  Never done   • Hepatitis C Screening  Never done   • COVID-19 Vaccine (5 - Booster for Pfizer series) 08/08/2022   • Influenza Vaccine (1) 09/01/2022       Patient is due for topics as listed above but is not proceeding with Immunization(s) COVID-19, Hep B and Influenza at this time. Will have at pharmacy. DM eye exam at Dr. Goode, [place] location. 2/2022.   Patient sustained a left proximal humerus fracture after a fall  Patient underwent left shoulder reverse arthroplasty by Orthopedics POD# 3  Further care per orthopedics  Encourage incentive spirometry, pain management  PT/OT

## 2022-10-24 ENCOUNTER — TELEPHONE (OUTPATIENT)
Dept: PULMONOLOGY | Facility: MEDICAL CENTER | Age: 72
End: 2022-10-24

## 2022-10-31 ENCOUNTER — TELEMEDICINE (OUTPATIENT)
Dept: INTERNAL MEDICINE CLINIC | Facility: CLINIC | Age: 72
End: 2022-10-31

## 2022-10-31 ENCOUNTER — HOSPITAL ENCOUNTER (INPATIENT)
Facility: HOSPITAL | Age: 72
LOS: 3 days | Discharge: HOME HEALTH | End: 2022-11-03
Attending: EMERGENCY MEDICINE | Admitting: STUDENT IN AN ORGANIZED HEALTH CARE EDUCATION/TRAINING PROGRAM

## 2022-10-31 ENCOUNTER — APPOINTMENT (EMERGENCY)
Dept: RADIOLOGY | Facility: HOSPITAL | Age: 72
End: 2022-10-31

## 2022-10-31 VITALS
TEMPERATURE: 99.3 F | BODY MASS INDEX: 45.27 KG/M2 | OXYGEN SATURATION: 85 % | HEIGHT: 62 IN | WEIGHT: 246 LBS | HEART RATE: 68 BPM

## 2022-10-31 DIAGNOSIS — I10 ESSENTIAL HYPERTENSION: ICD-10-CM

## 2022-10-31 DIAGNOSIS — I50.32 CHRONIC DIASTOLIC HEART FAILURE (HCC): ICD-10-CM

## 2022-10-31 DIAGNOSIS — J96.11 CHRONIC RESPIRATORY FAILURE WITH HYPOXIA AND HYPERCAPNIA (HCC): Chronic | ICD-10-CM

## 2022-10-31 DIAGNOSIS — U07.1 COVID: Primary | ICD-10-CM

## 2022-10-31 DIAGNOSIS — J96.12 CHRONIC RESPIRATORY FAILURE WITH HYPOXIA AND HYPERCAPNIA (HCC): Chronic | ICD-10-CM

## 2022-10-31 DIAGNOSIS — N17.9 AKI (ACUTE KIDNEY INJURY) (HCC): ICD-10-CM

## 2022-10-31 DIAGNOSIS — E11.00 TYPE 2 DIABETES MELLITUS WITH HYPEROSMOLARITY WITHOUT COMA, WITHOUT LONG-TERM CURRENT USE OF INSULIN (HCC): ICD-10-CM

## 2022-10-31 DIAGNOSIS — J45.909 MILD ASTHMA WITHOUT COMPLICATION, UNSPECIFIED WHETHER PERSISTENT: ICD-10-CM

## 2022-10-31 DIAGNOSIS — N18.32 STAGE 3B CHRONIC KIDNEY DISEASE (HCC): ICD-10-CM

## 2022-10-31 DIAGNOSIS — D64.9 ANEMIA, UNSPECIFIED TYPE: Primary | ICD-10-CM

## 2022-10-31 DIAGNOSIS — I48.0 PAROXYSMAL ATRIAL FIBRILLATION (HCC): ICD-10-CM

## 2022-10-31 DIAGNOSIS — G47.33 OSA (OBSTRUCTIVE SLEEP APNEA): ICD-10-CM

## 2022-10-31 DIAGNOSIS — U07.1 COVID-19: ICD-10-CM

## 2022-10-31 PROBLEM — N18.9 ACUTE KIDNEY INJURY SUPERIMPOSED ON CKD (HCC): Status: ACTIVE | Noted: 2022-08-19

## 2022-10-31 LAB
4HR DELTA HS TROPONIN: 0 NG/L
ALBUMIN SERPL BCP-MCNC: 2.8 G/DL (ref 3.5–5)
ALP SERPL-CCNC: 65 U/L (ref 46–116)
ALT SERPL W P-5'-P-CCNC: 17 U/L (ref 12–78)
ANION GAP SERPL CALCULATED.3IONS-SCNC: 8 MMOL/L (ref 4–13)
BASOPHILS # BLD AUTO: 0.07 THOUSANDS/ÂΜL (ref 0–0.1)
BASOPHILS NFR BLD AUTO: 1 % (ref 0–1)
BILIRUB SERPL-MCNC: 0.22 MG/DL (ref 0.2–1)
BUN SERPL-MCNC: 53 MG/DL (ref 5–25)
CALCIUM ALBUM COR SERPL-MCNC: 9.4 MG/DL (ref 8.3–10.1)
CALCIUM SERPL-MCNC: 8.4 MG/DL (ref 8.3–10.1)
CARDIAC TROPONIN I PNL SERPL HS: 7 NG/L
CARDIAC TROPONIN I PNL SERPL HS: 7 NG/L
CHLORIDE SERPL-SCNC: 98 MMOL/L (ref 96–108)
CK SERPL-CCNC: 41 U/L (ref 26–192)
CO2 SERPL-SCNC: 31 MMOL/L (ref 21–32)
CREAT SERPL-MCNC: 2.29 MG/DL (ref 0.6–1.3)
CRP SERPL QL: 87.2 MG/L
D DIMER PPP FEU-MCNC: 0.91 UG/ML FEU
EOSINOPHIL # BLD AUTO: 0.04 THOUSAND/ÂΜL (ref 0–0.61)
EOSINOPHIL NFR BLD AUTO: 1 % (ref 0–6)
ERYTHROCYTE [DISTWIDTH] IN BLOOD BY AUTOMATED COUNT: 14.6 % (ref 11.6–15.1)
GFR SERPL CREATININE-BSD FRML MDRD: 21 ML/MIN/1.73SQ M
GLUCOSE SERPL-MCNC: 238 MG/DL (ref 65–140)
GLUCOSE SERPL-MCNC: 302 MG/DL (ref 65–140)
HCT VFR BLD AUTO: 40.2 % (ref 34.8–46.1)
HGB BLD-MCNC: 12.4 G/DL (ref 11.5–15.4)
IMM GRANULOCYTES # BLD AUTO: 0.03 THOUSAND/UL (ref 0–0.2)
IMM GRANULOCYTES NFR BLD AUTO: 0 % (ref 0–2)
LYMPHOCYTES # BLD AUTO: 1.72 THOUSANDS/ÂΜL (ref 0.6–4.47)
LYMPHOCYTES NFR BLD AUTO: 22 % (ref 14–44)
MCH RBC QN AUTO: 28.8 PG (ref 26.8–34.3)
MCHC RBC AUTO-ENTMCNC: 30.8 G/DL (ref 31.4–37.4)
MCV RBC AUTO: 93 FL (ref 82–98)
MONOCYTES # BLD AUTO: 1.06 THOUSAND/ÂΜL (ref 0.17–1.22)
MONOCYTES NFR BLD AUTO: 13 % (ref 4–12)
NEUTROPHILS # BLD AUTO: 4.97 THOUSANDS/ÂΜL (ref 1.85–7.62)
NEUTS SEG NFR BLD AUTO: 63 % (ref 43–75)
NRBC BLD AUTO-RTO: 0 /100 WBCS
NT-PROBNP SERPL-MCNC: 718 PG/ML
PLATELET # BLD AUTO: 172 THOUSANDS/UL (ref 149–390)
PMV BLD AUTO: 10.6 FL (ref 8.9–12.7)
POTASSIUM SERPL-SCNC: 4.6 MMOL/L (ref 3.5–5.3)
PROCALCITONIN SERPL-MCNC: 0.11 NG/ML
PROT SERPL-MCNC: 6.9 G/DL (ref 6.4–8.4)
RBC # BLD AUTO: 4.31 MILLION/UL (ref 3.81–5.12)
SARS-COV-2 RNA RESP QL NAA+PROBE: POSITIVE
SODIUM SERPL-SCNC: 137 MMOL/L (ref 135–147)
WBC # BLD AUTO: 7.89 THOUSAND/UL (ref 4.31–10.16)

## 2022-10-31 PROCEDURE — XW033E5 INTRODUCTION OF REMDESIVIR ANTI-INFECTIVE INTO PERIPHERAL VEIN, PERCUTANEOUS APPROACH, NEW TECHNOLOGY GROUP 5: ICD-10-PCS | Performed by: INTERNAL MEDICINE

## 2022-10-31 RX ORDER — AMMONIUM LACTATE 12 G/100G
LOTION TOPICAL DAILY
Status: DISCONTINUED | OUTPATIENT
Start: 2022-11-01 | End: 2022-11-03 | Stop reason: HOSPADM

## 2022-10-31 RX ORDER — PREGABALIN 100 MG/1
100 CAPSULE ORAL 2 TIMES DAILY
Status: DISCONTINUED | OUTPATIENT
Start: 2022-10-31 | End: 2022-11-03 | Stop reason: HOSPADM

## 2022-10-31 RX ORDER — INSULIN LISPRO 100 [IU]/ML
2-12 INJECTION, SOLUTION INTRAVENOUS; SUBCUTANEOUS
Status: DISCONTINUED | OUTPATIENT
Start: 2022-11-01 | End: 2022-11-03 | Stop reason: HOSPADM

## 2022-10-31 RX ORDER — PRAVASTATIN SODIUM 80 MG/1
80 TABLET ORAL
Status: DISCONTINUED | OUTPATIENT
Start: 2022-10-31 | End: 2022-11-03 | Stop reason: HOSPADM

## 2022-10-31 RX ORDER — METOPROLOL SUCCINATE 50 MG/1
50 TABLET, EXTENDED RELEASE ORAL DAILY
Status: DISCONTINUED | OUTPATIENT
Start: 2022-11-01 | End: 2022-11-03 | Stop reason: HOSPADM

## 2022-10-31 RX ORDER — SODIUM CHLORIDE 9 MG/ML
50 INJECTION, SOLUTION INTRAVENOUS CONTINUOUS
Status: DISPENSED | OUTPATIENT
Start: 2022-10-31 | End: 2022-11-01

## 2022-10-31 RX ORDER — INSULIN GLARGINE 100 [IU]/ML
25 INJECTION, SOLUTION SUBCUTANEOUS
Status: DISCONTINUED | OUTPATIENT
Start: 2022-10-31 | End: 2022-11-02

## 2022-10-31 RX ORDER — CHOLECALCIFEROL (VITAMIN D3) 10 MCG
1 TABLET ORAL DAILY
Status: DISCONTINUED | OUTPATIENT
Start: 2022-11-01 | End: 2022-11-03 | Stop reason: HOSPADM

## 2022-10-31 RX ORDER — ACETAMINOPHEN 325 MG/1
650 TABLET ORAL EVERY 6 HOURS PRN
Status: DISCONTINUED | OUTPATIENT
Start: 2022-10-31 | End: 2022-11-03 | Stop reason: HOSPADM

## 2022-10-31 RX ORDER — ONDANSETRON 2 MG/ML
4 INJECTION INTRAMUSCULAR; INTRAVENOUS EVERY 6 HOURS PRN
Status: DISCONTINUED | OUTPATIENT
Start: 2022-10-31 | End: 2022-11-03 | Stop reason: HOSPADM

## 2022-10-31 RX ORDER — MONTELUKAST SODIUM 10 MG/1
10 TABLET ORAL
Status: DISCONTINUED | OUTPATIENT
Start: 2022-10-31 | End: 2022-11-03 | Stop reason: HOSPADM

## 2022-10-31 RX ORDER — INSULIN LISPRO 100 [IU]/ML
1-5 INJECTION, SOLUTION INTRAVENOUS; SUBCUTANEOUS
Status: DISCONTINUED | OUTPATIENT
Start: 2022-10-31 | End: 2022-11-03 | Stop reason: HOSPADM

## 2022-10-31 RX ORDER — DOCUSATE SODIUM 100 MG/1
100 CAPSULE, LIQUID FILLED ORAL DAILY
Status: DISCONTINUED | OUTPATIENT
Start: 2022-10-31 | End: 2022-11-03 | Stop reason: HOSPADM

## 2022-10-31 RX ORDER — ALBUTEROL SULFATE 90 UG/1
2 AEROSOL, METERED RESPIRATORY (INHALATION) EVERY 6 HOURS PRN
Status: DISCONTINUED | OUTPATIENT
Start: 2022-10-31 | End: 2022-11-03 | Stop reason: HOSPADM

## 2022-10-31 RX ADMIN — INSULIN LISPRO 3 UNITS: 100 INJECTION, SOLUTION INTRAVENOUS; SUBCUTANEOUS at 22:32

## 2022-10-31 RX ADMIN — REMDESIVIR 200 MG: 100 INJECTION, POWDER, LYOPHILIZED, FOR SOLUTION INTRAVENOUS at 22:31

## 2022-10-31 RX ADMIN — APIXABAN 5 MG: 5 TABLET, FILM COATED ORAL at 19:57

## 2022-10-31 RX ADMIN — SODIUM CHLORIDE 50 ML/HR: 0.9 INJECTION, SOLUTION INTRAVENOUS at 20:01

## 2022-10-31 RX ADMIN — INSULIN GLARGINE 25 UNITS: 100 INJECTION, SOLUTION SUBCUTANEOUS at 22:32

## 2022-10-31 RX ADMIN — PREGABALIN 100 MG: 100 CAPSULE ORAL at 19:57

## 2022-10-31 NOTE — ED PROVIDER NOTES
History  Chief Complaint   Patient presents with   • Shortness of Breath     Covid + since yesterday symptoms started Friday with cough, fever and now SOB, sent by PCP office for low O3    68-year-old female presents the ED states that she tested positive for COVID yesterday she has been having symptoms which started on Friday with cough fever now she has shortness of breath  Patient was sent into the ED from the PCP office for low oxygen level  No other complaints  patient was sent by her PCP because her O2 sats were in the mid 80s at home while she was on oxygen  She states that if she lies still the seem to improve however when she gets walking around the just 10 to dip right back down again  History provided by:  Patient   used: No        Prior to Admission Medications   Prescriptions Last Dose Informant Patient Reported? Taking? Empagliflozin (Jardiance) 10 MG TABS   No No   Sig: Take 1 tablet (10 mg total) by mouth every morning   Insulin Pen Needle (BD Pen Needle Pilar 2nd Gen) 32G X 4 MM MISC  Self No No   Sig: For use with insulin pen  Pharmacy may dispense brand covered by insurance     Insulin Pen Needle (NovoFine Autocover) 30G X 8 MM MISC  Self No No   Sig: Inject under the skin daily   Insulin Pen Needle 30G X 8 MM MISC  Self Yes No   Si (two) times a day   Lancets (onetouch ultrasoft) lancets  Self No No   Sig: Use once daily   Toujeo SoloStar 300 units/mL CONCENTRATED U-300 injection pen (1-unit dial)   No No   Sig: Inject 25 Units under the skin daily at bedtime   Trulicity 1 5 HO/9 1SH SOPN  Self Yes No   acetaminophen (TYLENOL) 325 mg tablet  Self Yes No   Sig: Take 650 mg by mouth every 6 (six) hours as needed for mild pain     albuterol (PROVENTIL HFA,VENTOLIN HFA) 90 mcg/act inhaler  Self No No   Sig: Inhale 2 puffs every 6 (six) hours as needed for wheezing   ammonium lactate (LAC-HYDRIN) 12 % lotion  Self Yes No   Sig: APPLY TOPICALLY TO AFFECTED AREAS twice a day   apixaban (ELIQUIS) 5 mg  Self No No   Sig: Take 1 tablet (5 mg total) by mouth in the morning and 1 tablet (5 mg total) in the evening     b complex-vitamin C-folic acid (NEPHROCAPS) 1 mg capsule  Self No No   Sig: Take 1 capsule by mouth daily   clotrimazole-betamethasone (LOTRISONE) 1-0 05 % cream  Self No No   Sig: Apply topically 2 (two) times a day Under both breast   docusate sodium (COLACE) 100 mg capsule  Self Yes No   Sig: Take 100 mg by mouth in the morning   glucose blood test strip  Self No No   Sig: Use 1 each 2 (two) times a day Use one 2 times daily   insulin lispro (HumaLOG KwikPen) 100 units/mL injection pen  Self No No   Sig: 3 times with meal according to sliding scale - >Blood Glucose 150 - 199: 2 Unit of Insulin, Blood Glucose 200 - 249: 4 Units of Insulin, Blood Glucose 250 - 299: 6 Units of Insulin, Blood Glucose 300 - 349: 8 Units of Insulin, Blood Glucose 350 - 399: 10 Units of Inuslin,Blood Glucose greater than or equal to 400: 12 Units of Insulin-please contact your physician   metoprolol succinate (TOPROL-XL) 50 mg 24 hr tablet  Self No No   Sig: Take 1 tablet (50 mg total) by mouth daily   mometasone (ELOCON) 0 1 % cream  Self No No   Sig: Apply topically daily Apply on both hands and left fore arm   montelukast (SINGULAIR) 10 mg tablet  Self Yes No   Sig: Take 10 mg by mouth daily     pregabalin (LYRICA) 100 mg capsule  Self No No   Sig: Take 1 capsule (100 mg total) by mouth 2 (two) times a day   rosuvastatin (CRESTOR) 10 MG tablet  Self No No   Sig: Take 1 tablet (10 mg total) by mouth daily   sodium chloride (OCEAN) 0 65 % nasal spray  Self No No   Si spray into each nostril every hour as needed for congestion   torsemide (DEMADEX) 20 mg tablet   No No   Sig: Take 2 tablets in the AM      Facility-Administered Medications: None       Past Medical History:   Diagnosis Date   • Anemia    • Asthma    • COVID-2022   • GERD (gastroesophageal reflux disease)    • Hyperlipidemia    • PONV (postoperative nausea and vomiting)    • SVT (supraventricular tachycardia) ([facility])        Past Surgical History:   Procedure Laterality Date   • APPENDECTOMY     • CYSTOSCOPY W/ LASER LITHOTRIPSY Left 2016    Procedure: CYSTOSCOPY URETEROSCOPY WITH LITHOTRIPSY HOLMIUM LASER, RETROGRADE PYELOGRAM AND INSERTION STENT URETERAL;  Surgeon: Remigio Sethi MD;  Location: [address] [place] [address];  Service:    • DILATION AND CURETTAGE OF UTERUS     • IR THORACENTESIS  3/18/2022   • JOINT REPLACEMENT      right knee   • KNEE ARTHROPLASTY Right    • ND CYSTOURETHROSCOPY,URETER CATHETER Left 2016    Procedure: CYSTOSCOPY RETROGRADE PYELOGRAM WITH INSERTION STENT URETERAL, left;  Surgeon: Remigio Sethi MD;  Location: [address] [place] [address];  Service: Urology   • ND RECONSTR TOTAL SHOULDER IMPLANT Left 3/1/2022    Procedure: ARTHROPLASTY SHOULDER REVERSE;  Surgeon: Bhaskar Mulligan MD;  Location: [facility];  Service: Orthopedics   • SHOULDER ARTHROTOMY Left        Family History   Problem Relation Age of Onset   • Heart disease Mother    • Emphysema Maternal Grandmother    • Heart disease Family      I have reviewed and agree with the history as documented  E-Cigarette/Vaping   • E-Cigarette Use Never User      E-Cigarette/Vaping Substances     Social History     Tobacco Use   • Smoking status: Former Smoker     Packs/day: 1 00     Years: 20 00     Pack years: 20 00     Types: Cigarettes     Quit date: 1996     Years since quittin 3   • Smokeless tobacco: Never Used   Vaping Use   • Vaping Use: Never used   Substance Use Topics   • Alcohol use: Not Currently     Comment: rarely   • Drug use: No       Review of Systems   Constitutional: Negative for activity change, chills, diaphoresis and fever  HENT: Negative for congestion, ear pain, nosebleeds, sore throat, trouble swallowing and voice change  Eyes: Negative for pain, discharge and redness     Respiratory: Positive for cough and shortness of breath  Negative for apnea, choking, wheezing and stridor  Cardiovascular: Negative for chest pain and palpitations  Gastrointestinal: Negative for abdominal distention, abdominal pain, constipation, diarrhea, nausea and vomiting  Endocrine: Negative for polydipsia  Genitourinary: Negative for difficulty urinating, dysuria, flank pain, frequency, hematuria and urgency  Musculoskeletal: Negative for back pain, gait problem, joint swelling, myalgias, neck pain and neck stiffness  Skin: Negative for pallor and rash  Neurological: Negative for dizziness, tremors, syncope, speech difficulty, weakness, numbness and headaches  Hematological: Negative for adenopathy  Psychiatric/Behavioral: Negative for confusion, hallucinations, self-injury and suicidal ideas  The patient is not nervous/anxious  Physical Exam  Physical Exam  Vitals and nursing note reviewed  Constitutional:       General: She is not in acute distress  Appearance: She is well-developed  She is not diaphoretic  HENT:      Head: Normocephalic and atraumatic  Right Ear: External ear normal       Left Ear: External ear normal       Nose: Nose normal    Eyes:      Conjunctiva/sclera: Conjunctivae normal       Pupils: Pupils are equal, round, and reactive to light  Cardiovascular:      Rate and Rhythm: Normal rate and regular rhythm  Heart sounds: Normal heart sounds  Pulmonary:      Effort: Pulmonary effort is normal       Breath sounds: Normal breath sounds  Abdominal:      General: Bowel sounds are normal       Palpations: Abdomen is soft  Musculoskeletal:         General: Normal range of motion  Cervical back: Normal range of motion and neck supple  Skin:     General: Skin is warm and dry  Neurological:      Mental Status: She is alert and oriented to person, place, and time  Deep Tendon Reflexes: Reflexes are normal and symmetric           Vital Signs  ED Triage Vitals [10/31/22 1506]   Temperature Pulse Respirations Blood Pressure SpO2   98 4 °F (36 9 °C) 95 18 111/55 93 %      Temp src Heart Rate Source Patient Position - Orthostatic VS BP Location FiO2 (%)   -- -- -- -- --      Pain Score       --           Vitals:    10/31/22 1506 10/31/22 1715 10/31/22 1730   BP: 111/55  (!) 145/103   Pulse: 95 60 64         Visual Acuity      ED Medications  Medications - No data to display    Diagnostic Studies  Results Reviewed     Procedure Component Value Units Date/Time    Comprehensive metabolic panel [[id number]]  (Abnormal) Collected: 10/31/22 1632    Lab Status: Final result Specimen: Blood from Arm, Left Updated: 10/31/22 1707     Sodium 137 mmol/L      Potassium 4 6 mmol/L      Chloride 98 mmol/L      CO2 31 mmol/L      ANION GAP 8 mmol/L      BUN 53 mg/dL      Creatinine 2 29 mg/dL      Glucose 238 mg/dL      Calcium 8 4 mg/dL      Corrected Calcium 9 4 mg/dL      AST --     ALT 17 U/L      Alkaline Phosphatase 65 U/L      Total Protein 6 9 g/dL      Albumin 2 8 g/dL      Total Bilirubin 0 22 mg/dL      eGFR 21 ml/min/1 73sq m     Narrative:      [place] guidelines for Chronic Kidney Disease (CKD):   •  Stage 1 with normal or high GFR (GFR > 90 mL/min/1 73 square meters)  •  Stage 2 Mild CKD (GFR = 60-89 mL/min/1 73 square meters)  •  Stage 3A Moderate CKD (GFR = 45-59 mL/min/1 73 square meters)  •  Stage 3B Moderate CKD (GFR = 30-44 mL/min/1 73 square meters)  •  Stage 4 Severe CKD (GFR = 15-29 mL/min/1 73 square meters)  •  Stage 5 End Stage CKD (GFR <15 mL/min/1 73 square meters)  Note: GFR calculation is accurate only with a steady state creatinine    COVID only [[id number]]  (Abnormal) Collected: 10/31/22 1632    Lab Status: Final result Specimen: Nares from Nose Updated: 10/31/22 1706     SARS-CoV-2 Positive    Narrative:      FOR PEDIATRIC PATIENTS - copy/paste COVID Guidelines URL to browser: [email or web address]    SARS-CoV-2 assay is a Nucleic Acid Amplification assay intended for the  qualitative detection of nucleic acid from SARS-CoV-2 in nasopharyngeal  swabs  Results are for the presumptive identification of SARS-CoV-2 RNA  Positive results are indicative of infection with SARS-CoV-2, the virus  causing COVID-19, but do not rule out bacterial infection or co-infection  with other viruses  Laboratories within the [place] and its  territories are required to report all positive results to the appropriate  public health authorities  Negative results do not preclude SARS-CoV-2  infection and should not be used as the sole basis for treatment or other  patient management decisions  Negative results must be combined with  clinical observations, patient history, and epidemiological information  This test has not been FDA cleared or approved  This test has been authorized by FDA under an Emergency Use Authorization  (EUA)  This test is only authorized for the duration of time the  declaration that circumstances exist justifying the authorization of the  emergency use of an in vitro diagnostic tests for detection of SARS-CoV-2  virus and/or diagnosis of COVID-19 infection under section 564(b)(1) of  the Act, 21 U  S C  464AEW-2(F)(3), unless the authorization is terminated  or revoked sooner  The test has been validated but independent review by FDA  and CLIA is pending  Test performed using [facility] GeneXpert: This RT-PCR assay targets N2,  a region unique to SARS-CoV-2  A conserved region in the E-gene was chosen  for pan-Sarbecovirus detection which includes SARS-CoV-2  According to CMS-2020-01-R, this platform meets the definition of high-throughput technology      HS Troponin 0hr (reflex protocol) [[id number]]  (Normal) Collected: 10/31/22 1632    Lab Status: Final result Specimen: Blood from Arm, Left Updated: 10/31/22 1705     hs TnI 0hr 7 ng/L     HS Troponin I 2hr [[id number]]     Lab Status: No result Specimen: Blood     NT-BNP PRO [[id number]]  (Abnormal) Collected: 10/31/22 1632    Lab Status: Final result Specimen: Blood from Arm, Left Updated: 10/31/22 1704     NT-proBNP 718 pg/mL     CBC and differential [[id number]]  (Abnormal) Collected: 10/31/22 1632    Lab Status: Final result Specimen: Blood from Arm, Left Updated: 10/31/22 1640     WBC 7 89 Thousand/uL      RBC 4 31 Million/uL      Hemoglobin 12 4 g/dL      Hematocrit 40 2 %      MCV 93 fL      MCH 28 8 pg      MCHC 30 8 g/dL      RDW 14 6 %      MPV 10 6 fL      Platelets 261 Thousands/uL      nRBC 0 /100 WBCs      Neutrophils Relative 63 %      Immat GRANS % 0 %      Lymphocytes Relative 22 %      Monocytes Relative 13 %      Eosinophils Relative 1 %      Basophils Relative 1 %      Neutrophils Absolute 4 97 Thousands/µL      Immature Grans Absolute 0 03 Thousand/uL      Lymphocytes Absolute 1 72 Thousands/µL      Monocytes Absolute 1 06 Thousand/µL      Eosinophils Absolute 0 04 Thousand/µL      Basophils Absolute 0 07 Thousands/µL                  XR chest 1 view portable   Final Result by Cici Ramirez DO (10/31 1649)   No acute cardiopulmonary disease  Workstation performed: [id number]                    Procedures  Procedures         ED Course                               SBIRT 20yo+    Flowsheet Row Most Recent Value   SBIRT (23 yo +)    In order to provide better care to our patients, we are screening all of our patients for alcohol and drug use  Would it be okay to ask you these screening questions?  No Filed at: 10/31/2022 1721                    MDM    Disposition  Final diagnoses:   COVID   SEBASTIAN (acute kidney injury) ([facility] [address] )     Time reflects when diagnosis was documented in both MDM as applicable and the Disposition within this note     Time User Action Codes Description Comment    10/31/2022  5:39 PM Mariluz Stephens Add [U07 1] COVID 10/31/2022  5:40 PM Sreekanth Gan [N17 9] SEBASTIAN (acute kidney injury) [facility])       ED Disposition     ED Disposition   Admit    Condition   Stable    Date/Time   Mon Oct 31, 2022  5:39 PM    Comment   Case was discussed with Dr Marley Macario  and the patient's admission status was agreed to be Admission Status: inpatient status to the service of Dr Rosena Collet   Follow-up Information    None         Patient's Medications   Discharge Prescriptions    No medications on file       No discharge procedures on file      PDMP Review       Value Time User    PDMP Reviewed  Yes 3/12/2022 10:25 AM Dee Recinos          ED Provider  Electronically Signed by           Tiffany Vance DO  10/31/22 2950

## 2022-10-31 NOTE — ASSESSMENT & PLAN NOTE
Wt Readings from Last 3 Encounters:   10/31/22 112 kg (246 lb)   10/31/22 112 kg (246 lb)   09/09/22 112 kg (246 lb)     Continue metoprolol 50 milligram p o   Daily  Hold torsemide  Continue Jardiance  Continue cardiac diet

## 2022-10-31 NOTE — PROGRESS NOTES
COVID-19 Outpatient Progress Note    Assessment/Plan:    Problem List Items Addressed This Visit        Endocrine    Diabetes mellitus, type 2 ([facility] [address] )       Respiratory    Chronic respiratory failure with hypoxia and hypercapnia (HCC) (Chronic)    Asthma    KATRINA (obstructive sleep apnea)       Cardiovascular and Mediastinum    Paroxysmal atrial fibrillation (HCC)    Essential hypertension    Chronic diastolic heart failure (HCC)       Genitourinary    Stage 3b chronic kidney disease ([facility] [address] )       Other    Anemia - Primary         Disposition:     Patient with multiple comorbidities acutely sick appearing patients who is hypoxic and acutely sick with confirm COVID-19 on the test done at home  comorbidities includes history of chronic respiratory failure, obstructive sleep apnea, hypertension, paroxysmal atrial fibrillation, CKD level 3B, chronic diastolic heart failure, obesity,    Will refer to the emergency room for further evaluation and management  They will call 911  I have spent 20 minutes directly with the patient  Greater than 50% of this time was spent in counseling/coordination of care regarding: prognosis, instructions for management, patient and family education and impressions  Due do patient's acute sickness with multiple comorbidities and hypoxia and acute respiratory failure or patient needs to be evaluated in the emergency room  They will let me know outcome of ER visit  If pt comes home will consider oral paxlovid or MAB    Given her apperance, pt may end up in hospital      Encounter provider: Shree Ross MD     Provider located at: [facility] INTERNAL MEDICINE  [address] [place] [address]     Recent Visits  No visits were found meeting these conditions    Showing recent visits within past 7 days and meeting all other requirements  Today's Visits  Date Type Provider Dept   10/31/22 Telemedicine Kyara Umana William Jo MD [facility] Internal Med   Showing today's visits and meeting all other requirements  Future Appointments  No visits were found meeting these conditions  Showing future appointments within next 150 days and meeting all other requirements     This virtual check-in was done via [address] and patient was informed that this is a secure, HIPAA-compliant platform  She agrees to proceed  Patient agrees to participate in a virtual check in via telephone or video visit instead of presenting to the office to address urgent/immediate medical needs  Patient is aware this is a billable service  She acknowledged consent and understanding of privacy and security of the video platform  The patient has agreed to participate and understands they can discontinue the visit at any time  After connecting through [facility], the patient was identified by name and date of birth  Veena Johnson was informed that this was a telemedicine visit and that the exam was being conducted confidentially over secure lines  My office door was closed  No one else was in the room  Veena Johnson acknowledged consent and understanding of privacy and security of the telemedicine visit  I informed the patient that I have reviewed her record in Epic and presented the opportunity for her to ask any questions regarding the visit today  The patient agreed to participate  Verification of patient location:  Patient is located in the following state in which I hold an active license: [place]    Subjective:   Veena Johnson is a 81 y o  female who is concerned about COVID-19  Patient's symptoms include fever, chills, fatigue, malaise, rhinorrhea, sore throat, cough, shortness of breath, chest tightness, myalgias and headache  Patient denies congestion, anosmia, loss of taste, abdominal pain, nausea, vomiting and diarrhea       - Date of symptom onset: 10/28/2022      COVID-19 vaccination status: Fully vaccinated (primary series)    Exposure: Contact with a person who is under investigation (PUI) for or who is positive for COVID-19 within the last 14 days?: Yes    Hospitalized recently for fever and/or lower respiratory symptoms?: No      Currently a healthcare worker that is involved in direct patient care?: No      Works in a special setting where the risk of COVID-19 transmission may be high? (this may include long-term care, correctional and halfway facilities; homeless shelters; assisted-living facilities and group homes ): No      Resident in a special setting where the risk of COVID-19 transmission may be high? (this may include long-term care, correctional and halfway facilities; homeless shelters; assisted-living facilities and group homes ): No      Patient was exposed to the care provider who is positive for COVID-19  Patient started with symptom on Friday  Since Saturday she started with fever fever has been up to 101  Also yesterday that is on Sunday patient's oxygen level started dropping in the 85 range  Patient's oxygen was wrist to 4 L  Patient appears acutely sick patient is lying in the bed  Lab Results   Component Value Date    SARSCOV2 Negative 05/16/2022       Review of Systems   Constitutional: Positive for chills, fatigue and fever  HENT: Positive for rhinorrhea and sore throat  Negative for congestion  Respiratory: Positive for cough, chest tightness and shortness of breath  Gastrointestinal: Negative for abdominal pain, diarrhea, nausea and vomiting  Musculoskeletal: Positive for back pain and myalgias  Neurological: Positive for weakness and headaches       Current Outpatient Medications on File Prior to Visit   Medication Sig   • acetaminophen (TYLENOL) 325 mg tablet Take 650 mg by mouth every 6 (six) hours as needed for mild pain     • albuterol (PROVENTIL HFA,VENTOLIN HFA) 90 mcg/act inhaler Inhale 2 puffs every 6 (six) hours as needed for wheezing   • ammonium lactate (LAC-HYDRIN) 12 % lotion APPLY TOPICALLY TO AFFECTED AREAS twice a day   • apixaban (ELIQUIS) 5 mg Take 1 tablet (5 mg total) by mouth in the morning and 1 tablet (5 mg total) in the evening  • b complex-vitamin C-folic acid (NEPHROCAPS) 1 mg capsule Take 1 capsule by mouth daily   • clotrimazole-betamethasone (LOTRISONE) 1-0 05 % cream Apply topically 2 (two) times a day Under both breast   • docusate sodium (COLACE) 100 mg capsule Take 100 mg by mouth in the morning   • Empagliflozin (Jardiance) 10 MG TABS Take 1 tablet (10 mg total) by mouth every morning   • glucose blood test strip Use 1 each 2 (two) times a day Use one 2 times daily   • insulin lispro (HumaLOG KwikPen) 100 units/mL injection pen 3 times with meal according to sliding scale - >Blood Glucose 150 - 199: 2 Unit of Insulin, Blood Glucose 200 - 249: 4 Units of Insulin, Blood Glucose 250 - 299: 6 Units of Insulin, Blood Glucose 300 - 349: 8 Units of Insulin, Blood Glucose 350 - 399: 10 Units of Inuslin,Blood Glucose greater than or equal to 400: 12 Units of Insulin-please contact your physician   • Insulin Pen Needle (BD Pen Needle Pilar 2nd Gen) 32G X 4 MM MISC For use with insulin pen  Pharmacy may dispense brand covered by insurance     • Insulin Pen Needle (NovoFine Autocover) 30G X 8 MM MISC Inject under the skin daily   • Insulin Pen Needle 30G X 8 MM MISC 2 (two) times a day   • Lancets (onetouch ultrasoft) lancets Use once daily   • metoprolol succinate (TOPROL-XL) 50 mg 24 hr tablet Take 1 tablet (50 mg total) by mouth daily   • mometasone (ELOCON) 0 1 % cream Apply topically daily Apply on both hands and left fore arm   • montelukast (SINGULAIR) 10 mg tablet Take 10 mg by mouth daily     • pregabalin (LYRICA) 100 mg capsule Take 1 capsule (100 mg total) by mouth 2 (two) times a day   • rosuvastatin (CRESTOR) 10 MG tablet Take 1 tablet (10 mg total) by mouth daily   • sodium chloride (OCEAN) 0 65 % nasal spray 1 spray into each nostril every hour as needed for congestion   • torsemide (DEMADEX) 20 mg tablet Take 2 tablets in the AM   • Toujeo SoloStar 300 units/mL CONCENTRATED U-300 injection pen (1-unit dial) Inject 25 Units under the skin daily at bedtime   • Trulicity 1 5 ZU/1 4LT SOPN        Objective:    Pulse 68   Temp 99 3 °F (37 4 °C)   Ht 5' 2" (1 575 m)   Wt 112 kg (246 lb)   SpO2 (!) 85% Comment: Oxygen level has been up and down in last 24 hours  BMI 44 99 kg/m²      Physical Exam  Constitutional:       Appearance: She is ill-appearing  Pulmonary:      Effort: No respiratory distress  Neurological:      Mental Status: She is oriented to person, place, and time         Alexandra Harp MD

## 2022-10-31 NOTE — ASSESSMENT & PLAN NOTE
Lab Results   Component Value Date    HGBA1C 9 0 (H) 07/20/2022       No results for input(s): POCGLU in the last 72 hours  Blood Sugar Average: Last 72 hrs:   patient is on Toujeo insulin which will be substituted with Lantus 35 units daily at bedtime  Patient been Humalog sliding scale with Accu-Cheks q a c   And HS  Blood sugar noted to be elevated in the ED  Patient will be on diabetic diet  Continue Jardiance

## 2022-10-31 NOTE — PATIENT INSTRUCTIONS
Will refer to the emergency room for further evaluation and management  They will call 911       Please let me know if the patient should come back after ER visit

## 2022-10-31 NOTE — ASSESSMENT & PLAN NOTE
History of proximal atrial fibrillation  Check EKG  Continue metoprolol 50 milligram p o   Daily and anticoagulation Eliquis 5 milligram p o  B i d

## 2022-10-31 NOTE — ASSESSMENT & PLAN NOTE
Patient presented with generalized weakness, body aches, fever cough with congestion  Tested for COVID positive on October 29, 2022  Patient reported hypoxia at home but O2 saturation has been in high 90s on room air in ER  Troponin was 7  ProBNP 718  As patient is high risk candidate given her comorbidities including age more than 74, morbid obesity, diabetes, CKD patient was started on remdesivir 200 milligram IV daily followed by 100 milligram IV daily  Check D-dimer, CK, procalcitonin level, CRP  Encourage incentive spirometry and ambulate as tolerated  Monitor oxygen status duration

## 2022-10-31 NOTE — ED NOTES
Family member on her record called and informed that patient will be admitted       Juliann West RN  10/31/22 1328

## 2022-10-31 NOTE — ASSESSMENT & PLAN NOTE
Lab Results   Component Value Date    EGFR 21 10/31/2022    EGFR 30 07/20/2022    EGFR 30 07/11/2022    CREATININE 2 29 (H) 10/31/2022    CREATININE 1 69 (H) 07/20/2022    CREATININE 1 69 (H) 07/11/2022   Baseline creatinine anywhere from 1 7-1 9  Creatinine mildly elevated at 2 29 upon admission  Hold torsemide  Echo from March 22 showed EF of 53% with borderline concentric hypertrophy   Gentle IV hydration with normal saline at 50 mL/hour x1 liter  Avoid nephrotoxic agents and hypotension  Follow-up BMP in [place]

## 2022-10-31 NOTE — ASSESSMENT & PLAN NOTE
Patient with multiple comorbidities acutely sick appearing patients who is hypoxic and acutely sick with confirm COVID-19 on the test done at home  comorbidities includes history of chronic respiratory failure, obstructive sleep apnea, hypertension, paroxysmal atrial fibrillation, CKD level 3B, chronic diastolic heart failure, obesity,    Will refer to the emergency room for further evaluation and management  They will call 911

## 2022-10-31 NOTE — H&P
Nick [address]  H&P- [place] 1950, 81 y o  female MRN: [id number]  Unit/Bed#: [address] [place] [address] Encounter: [id number]  Primary Care Provider: Tanya Davies MD   Date and time admitted to hospital: 10/31/2022  3:27 PM    * COVID-19  Assessment & Plan  Patient presented with generalized weakness, body aches, fever cough with congestion  Tested for COVID positive on October 29, 2022  Patient reported hypoxia at home but O2 saturation has been in high 90s on room air in ER  Troponin was 7  ProBNP 718  As patient is high risk candidate given her comorbidities including age more than 74, morbid obesity, diabetes, CKD patient was started on remdesivir 200 milligram IV daily followed by 100 milligram IV daily  Check D-dimer, CK, procalcitonin level, CRP  Encourage incentive spirometry and ambulate as tolerated  Monitor oxygen status duration    Acute kidney injury superimposed on CKD [facility])  Assessment & Plan  Lab Results   Component Value Date    EGFR 21 10/31/2022    EGFR 30 07/20/2022    EGFR 30 07/11/2022    CREATININE 2 29 (H) 10/31/2022    CREATININE 1 69 (H) 07/20/2022    CREATININE 1 69 (H) 07/11/2022   Baseline creatinine anywhere from 1 7-1 9  Creatinine mildly elevated at 2 29 upon admission  Hold torsemide  Echo from March 22 showed EF of 53% with borderline concentric hypertrophy   Gentle IV hydration with normal saline at 50 mL/hour x1 liter  Avoid nephrotoxic agents and hypotension  Follow-up BMP in a m  Chronic diastolic heart failure [facility])  Assessment & Plan  Wt Readings from Last 3 Encounters:   10/31/22 112 kg (246 lb)   10/31/22 112 kg (246 lb)   09/09/22 112 kg (246 lb)     Continue metoprolol 50 milligram p o  Daily  Hold torsemide  Continue Jardiance  Continue cardiac diet        Diabetes mellitus, type 2 ([facility] [address] )  Assessment & Plan  Lab Results   Component Value Date    HGBA1C 9 0 (H) 07/20/2022       No results for input(s): POCGLU in the last 72 hours      Blood Sugar Average: Last 72 hrs:   patient is on Toujeo insulin which will be substituted with Lantus 35 units daily at bedtime  Patient been Humalog sliding scale with Accu-Cheks q a c  And HS  Blood sugar noted to be elevated in the ED  Patient will be on diabetic diet  Continue Jardiance    Paroxysmal atrial fibrillation [facility])  Assessment & Plan  History of proximal atrial fibrillation  Check EKG  Continue metoprolol 50 milligram p o  Daily and anticoagulation Eliquis 5 milligram p o  B i d     KATRINA (obstructive sleep apnea)  Assessment & Plan  Noncompliant with BiPAP  Patient uses oxygen as needed    Obesity hypoventilation syndrome ([address] )  Assessment & Plan  Patient uses oxygen at night  Continue oxygen supplementation p r n  Mixed hyperlipidemia  Assessment & Plan  Crestor substituted with Pravachol    VTE Pharmacologic Prophylaxis: VTE Score: 5 High Risk (Score >/= 5) - Pharmacological DVT Prophylaxis Ordered: apixaban (Eliquis)  Sequential Compression Devices Ordered  Code Status:  Full code    Anticipated Length of Stay: Patient will be admitted on an inpatient basis with an anticipated length of stay of greater than 2 midnights secondary to COVID-19, acute kidney injury  Total Time for Visit, including Counseling / Coordination of Care: 60 minutes Greater than 50% of this total time spent on direct patient counseling and coordination of care  Chief Complaint:  Weakness cough, congestion    History of Present Illness:  Mello Henderson is a 81 y o  female with a PMH of diabetes mellitus type 2, obstructive sleep apnea, obesity hypoventilation syndrome, hypertension, PAF, chronic heart failure, morbid obesity, CKD who presents with generalized weakness with cough with productive cough, fever and chills for the past 4-5 days  Patient has not been feeling well with fever with diffuse body aches, cough productive of mucoid phlegm, generalized weakness and poor appetite for the past 4-5 days    Patient tested positive for COVID-19 2 days ago  Patient also been having fever and is taking Tylenol  Patient reported O2 saturation in mid 80s and was needing 2 liters oxygen which went up even up to 4 liters  Patient normally uses only oxygen at night  Patient also reported some shortness of breath  Today patient was seen by PCP and was sent to the ER for evaluation  Patient reported multiple sick contacts at home  In the ED patient's vital signs were stable with O2 saturation in mid to high 90s on room air  Labs showed elevated BUN creatinine 53 and 2 2 and chest x-ray was unremarkable  Review of Systems:  Review of Systems   Constitutional: Positive for activity change, appetite change, fatigue and fever  Negative for chills, diaphoresis and unexpected weight change  HENT: Positive for congestion  Negative for ear discharge, ear pain, facial swelling, hearing loss, mouth sores, nosebleeds, postnasal drip, rhinorrhea, sinus pressure, sneezing, sore throat, tinnitus, trouble swallowing and voice change  Eyes: Negative for photophobia, discharge, redness and visual disturbance  Respiratory: Positive for cough and shortness of breath  Negative for chest tightness, wheezing and stridor  Cardiovascular: Negative for chest pain, palpitations and leg swelling  Gastrointestinal: Negative for abdominal distention, abdominal pain, anal bleeding, blood in stool, constipation, diarrhea, nausea and vomiting  Endocrine: Negative for polydipsia, polyphagia and polyuria  Genitourinary: Negative for decreased urine volume, difficulty urinating, dysuria, flank pain, hematuria, menstrual problem, pelvic pain, urgency, vaginal bleeding and vaginal discharge  Musculoskeletal: Negative for arthralgias, back pain and neck stiffness  Skin: Negative for pallor and rash  Neurological: Positive for weakness  Negative for dizziness, seizures, facial asymmetry, speech difficulty, light-headedness, numbness and headaches  Hematological: Negative for adenopathy  Psychiatric/Behavioral: Negative for agitation and confusion  Past Medical and Surgical History:   Past Medical History:   Diagnosis Date   • Anemia    • Asthma    • COVID-19 January 2022   • GERD (gastroesophageal reflux disease)    • Hyperlipidemia    • PONV (postoperative nausea and vomiting)    • SVT (supraventricular tachycardia) ([facility])        Past Surgical History:   Procedure Laterality Date   • APPENDECTOMY     • CYSTOSCOPY W/ LASER LITHOTRIPSY Left 7/12/2016    Procedure: CYSTOSCOPY URETEROSCOPY WITH LITHOTRIPSY HOLMIUM LASER, RETROGRADE PYELOGRAM AND INSERTION STENT URETERAL;  Surgeon: Yumiko Vergara MD;  Location: [address] [place] [address];  Service:    • DILATION AND CURETTAGE OF UTERUS     • IR THORACENTESIS  3/18/2022   • JOINT REPLACEMENT      right knee   • KNEE ARTHROPLASTY Right    • MS CYSTOURETHROSCOPY,URETER CATHETER Left 6/29/2016    Procedure: CYSTOSCOPY RETROGRADE PYELOGRAM WITH INSERTION STENT URETERAL, left;  Surgeon: Yumiko Vergara MD;  Location: [address] [place] [address];  Service: Urology   • MS RECONSTR TOTAL SHOULDER IMPLANT Left 3/1/2022    Procedure: ARTHROPLASTY SHOULDER REVERSE;  Surgeon: Daphne Carias MD;  Location: [facility];  Service: Orthopedics   • SHOULDER ARTHROTOMY Left        Meds/Allergies:  Prior to Admission medications    Medication Sig Start Date End Date Taking?  Authorizing Provider   acetaminophen (TYLENOL) 325 mg tablet Take 650 mg by mouth every 6 (six) hours as needed for mild pain      Historical Provider, MD   albuterol (PROVENTIL HFA,VENTOLIN HFA) 90 mcg/act inhaler Inhale 2 puffs every 6 (six) hours as needed for wheezing 7/28/22   Tanya Davies MD   ammonium lactate (LAC-HYDRIN) 12 % lotion APPLY TOPICALLY TO AFFECTED AREAS twice a day 12/16/21   Historical Provider, MD   apixaban (ELIQUIS) 5 mg Take 1 tablet (5 mg total) by mouth in the morning and 1 tablet (5 mg total) in the evening  5/24/22   Toy Marianela Harvey MD   b complex-vitamin C-folic acid (NEPHROCAPS) 1 mg capsule Take 1 capsule by mouth daily 6/14/22 10/31/22  Shadi Duffy MD   clotrimazole-betamethasone (LOTRISONE) 1-0 05 % cream Apply topically 2 (two) times a day Under both breast 8/19/22   Shadi Duffy MD   docusate sodium (COLACE) 100 mg capsule Take 100 mg by mouth in the morning    Historical Provider, MD   Empagliflozin (Jardiance) 10 MG TABS Take 1 tablet (10 mg total) by mouth every morning 9/9/22   Luigi Quintana MD   glucose blood test strip Use 1 each 2 (two) times a day Use one 2 times daily 7/21/22   Shadi Duffy MD   insulin lispro (HumaLOG KwikPen) 100 units/mL injection pen 3 times with meal according to sliding scale - >Blood Glucose 150 - 199: 2 Unit of Insulin, Blood Glucose 200 - 249: 4 Units of Insulin, Blood Glucose 250 - 299: 6 Units of Insulin, Blood Glucose 300 - 349: 8 Units of Insulin, Blood Glucose 350 - 399: 10 Units of Inuslin,Blood Glucose greater than or equal to 400: 12 Units of Insulin-please contact your physician 5/23/22   Gabi Palacios MD   Insulin Pen Needle (BD Pen Needle Pilar 2nd Gen) 32G X 4 MM MISC For use with insulin pen  Pharmacy may dispense brand covered by insurance   5/23/22   Gabi Palacios MD   Insulin Pen Needle (NovoFine Autocover) 30G X 8 MM MISC Inject under the skin daily 7/29/22   Shadi Duffy MD   Insulin Pen Needle 30G X 8 MM MISC 2 (two) times a day    Historical Provider, MD   Lancets (onetouch ultrasoft) lancets Use once daily 4/8/22   Shadi Duffy MD   metoprolol succinate (TOPROL-XL) 50 mg 24 hr tablet Take 1 tablet (50 mg total) by mouth daily 7/7/22   RK Mota   mometasone (ELOCON) 0 1 % cream Apply topically daily Apply on both hands and left fore arm 8/19/22   Shadi Duffy MD   montelukast (SINGULAIR) 10 mg tablet Take 10 mg by mouth daily      Historical Provider, MD   pregabalin (LYRICA) 100 mg capsule Take 1 capsule (100 mg total) by mouth 2 (two) times a day 22   Mely Reddy MD   rosuvastatin (CRESTOR) 10 MG tablet Take 1 tablet (10 mg total) by mouth daily 22   Mely Reddy MD   sodium chloride (OCEAN) 0 65 % nasal spray 1 spray into each nostril every hour as needed for congestion 22   Destinee Ferraro MD   torsemide (DEMADEX) 20 mg tablet Take 2 tablets in the AM 22   MD Lev Guzman 300 units/mL CONCENTRATED U-300 injection pen (1-unit dial) Inject 25 Units under the skin daily at bedtime 22   Mely Reddy MD   Trulicity 1 5 IZ/3 5QU [facility]  21   Historical Provider, MD RENTERIA have reviewed home medications using recent Epic encounter  Allergies:    Allergies   Allergen Reactions   • Penicillins Hives   • Moxifloxacin Other (See Comments)     unknown   • Percocet [Oxycodone-Acetaminophen] Other (See Comments)     unknown   • Zinc Acetate Other (See Comments)     unknown   • Asa [Aspirin] GI Intolerance   • Indocin [Indomethacin] Other (See Comments)     Made patient "loopy"   • Other Other (See Comments)     unknown       Social History:  Marital Status: Single   Occupation:   Patient Pre-hospital Living Situation: Home  Patient Pre-hospital Level of Mobility: walks  Patient Pre-hospital Diet Restrictions:  Diabetic diet  Substance Use History:   Social History     Substance and Sexual Activity   Alcohol Use Not Currently    Comment: rarely     Social History     Tobacco Use   Smoking Status Former Smoker   • Packs/day: 1 00   • Years: 20 00   • Pack years: 20 00   • Types: Cigarettes   • Quit date: 1996   • Years since quittin 3   Smokeless Tobacco Never Used     Social History     Substance and Sexual Activity   Drug Use No       Family History:  Family History   Problem Relation Age of Onset   • Heart disease Mother    • Emphysema Maternal Grandmother    • Heart disease Family        Physical Exam:     Vitals:   Blood Pressure: 160/71 (10/31/22 1836)  Pulse: 66 (10/31/22 1836)  Temperature: 98 8 °F (37 1 °C) (10/31/22 1836)  Temp Source: Oral (10/31/22 1836)  Respirations: 20 (10/31/22 1836)  Height: 5' 2" (157 5 cm) (10/31/22 1506)  Weight - Scale: 112 kg (246 lb) (10/31/22 1506)  SpO2: 96 % (10/31/22 1836)    Physical Exam  Constitutional:       Appearance: Normal appearance  HENT:      Head: Normocephalic and atraumatic  Nose: Nose normal       Mouth/Throat:      Mouth: Mucous membranes are moist       Pharynx: Oropharynx is clear  Eyes:      Extraocular Movements: Extraocular movements intact  Pupils: Pupils are equal, round, and reactive to light  Cardiovascular:      Rate and Rhythm: Normal rate and regular rhythm  Pulmonary:      Effort: Pulmonary effort is normal       Breath sounds: Normal breath sounds  Abdominal:      General: Bowel sounds are normal  There is no distension  Palpations: Abdomen is soft  Tenderness: There is no abdominal tenderness  Musculoskeletal:         General: No swelling  Cervical back: Normal range of motion and neck supple  Skin:     General: Skin is warm and dry  Neurological:      General: No focal deficit present  Mental Status: She is alert           Additional Data:     Lab Results:  Results from last 7 days   Lab Units 10/31/22  1632   WBC Thousand/uL 7 89   HEMOGLOBIN g/dL 12 4   HEMATOCRIT % 40 2   PLATELETS Thousands/uL 172   NEUTROS PCT % 63   LYMPHS PCT % 22   MONOS PCT % 13*   EOS PCT % 1     Results from last 7 days   Lab Units 10/31/22  1632   SODIUM mmol/L 137   POTASSIUM mmol/L 4 6   CHLORIDE mmol/L 98   CO2 mmol/L 31   BUN mg/dL 53*   CREATININE mg/dL 2 29*   ANION GAP mmol/L 8   CALCIUM mg/dL 8 4   ALBUMIN g/dL 2 8*   TOTAL BILIRUBIN mg/dL 0 22   ALK PHOS U/L 65   ALT U/L 17   GLUCOSE RANDOM mg/dL 238*                       Imaging: Reviewed radiology reports from this admission including: chest xray  XR chest 1 view portable Final Result by Christine Howe DO (10/31 1649)   No acute cardiopulmonary disease  Workstation performed: [id number]             EKG and Other Studies Reviewed on Admission:   EKG:   ** Please Note: This note has been constructed using a voice recognition system   **

## 2022-11-01 ENCOUNTER — APPOINTMENT (INPATIENT)
Dept: RADIOLOGY | Facility: HOSPITAL | Age: 72
End: 2022-11-01

## 2022-11-01 LAB
ALBUMIN SERPL BCP-MCNC: 2.4 G/DL (ref 3.5–5)
ALP SERPL-CCNC: 56 U/L (ref 46–116)
ALT SERPL W P-5'-P-CCNC: 15 U/L (ref 12–78)
ANION GAP SERPL CALCULATED.3IONS-SCNC: 7 MMOL/L (ref 4–13)
APTT PPP: 32 SECONDS (ref 23–37)
AST SERPL W P-5'-P-CCNC: 15 U/L (ref 5–45)
ATRIAL RATE: 68 BPM
BASOPHILS # BLD AUTO: 0.05 THOUSANDS/ÂΜL (ref 0–0.1)
BASOPHILS NFR BLD AUTO: 1 % (ref 0–1)
BILIRUB SERPL-MCNC: 0.12 MG/DL (ref 0.2–1)
BUN SERPL-MCNC: 51 MG/DL (ref 5–25)
CALCIUM ALBUM COR SERPL-MCNC: 8.7 MG/DL (ref 8.3–10.1)
CALCIUM SERPL-MCNC: 7.4 MG/DL (ref 8.3–10.1)
CHLORIDE SERPL-SCNC: 105 MMOL/L (ref 96–108)
CO2 SERPL-SCNC: 30 MMOL/L (ref 21–32)
CREAT SERPL-MCNC: 1.74 MG/DL (ref 0.6–1.3)
CRP SERPL QL: 73.4 MG/L
EOSINOPHIL # BLD AUTO: 0.05 THOUSAND/ÂΜL (ref 0–0.61)
EOSINOPHIL NFR BLD AUTO: 1 % (ref 0–6)
ERYTHROCYTE [DISTWIDTH] IN BLOOD BY AUTOMATED COUNT: 14.7 % (ref 11.6–15.1)
GFR SERPL CREATININE-BSD FRML MDRD: 29 ML/MIN/1.73SQ M
GLUCOSE SERPL-MCNC: 215 MG/DL (ref 65–140)
GLUCOSE SERPL-MCNC: 221 MG/DL (ref 65–140)
GLUCOSE SERPL-MCNC: 230 MG/DL (ref 65–140)
GLUCOSE SERPL-MCNC: 249 MG/DL (ref 65–140)
GLUCOSE SERPL-MCNC: 269 MG/DL (ref 65–140)
HCT VFR BLD AUTO: 32.1 % (ref 34.8–46.1)
HGB BLD-MCNC: 9.9 G/DL (ref 11.5–15.4)
IMM GRANULOCYTES # BLD AUTO: 0.02 THOUSAND/UL (ref 0–0.2)
IMM GRANULOCYTES NFR BLD AUTO: 0 % (ref 0–2)
LYMPHOCYTES # BLD AUTO: 2.01 THOUSANDS/ÂΜL (ref 0.6–4.47)
LYMPHOCYTES NFR BLD AUTO: 35 % (ref 14–44)
MCH RBC QN AUTO: 28.6 PG (ref 26.8–34.3)
MCHC RBC AUTO-ENTMCNC: 30.8 G/DL (ref 31.4–37.4)
MCV RBC AUTO: 93 FL (ref 82–98)
MONOCYTES # BLD AUTO: 0.78 THOUSAND/ÂΜL (ref 0.17–1.22)
MONOCYTES NFR BLD AUTO: 14 % (ref 4–12)
NEUTROPHILS # BLD AUTO: 2.8 THOUSANDS/ÂΜL (ref 1.85–7.62)
NEUTS SEG NFR BLD AUTO: 49 % (ref 43–75)
NRBC BLD AUTO-RTO: 0 /100 WBCS
P AXIS: 30 DEGREES
PLATELET # BLD AUTO: 153 THOUSANDS/UL (ref 149–390)
PMV BLD AUTO: 10.8 FL (ref 8.9–12.7)
POTASSIUM SERPL-SCNC: 3.7 MMOL/L (ref 3.5–5.3)
PR INTERVAL: 184 MS
PROCALCITONIN SERPL-MCNC: 0.11 NG/ML
PROT SERPL-MCNC: 6 G/DL (ref 6.4–8.4)
QRS AXIS: -35 DEGREES
QRSD INTERVAL: 80 MS
QT INTERVAL: 418 MS
QTC INTERVAL: 444 MS
RBC # BLD AUTO: 3.46 MILLION/UL (ref 3.81–5.12)
SODIUM SERPL-SCNC: 142 MMOL/L (ref 135–147)
T WAVE AXIS: 22 DEGREES
VENTRICULAR RATE: 68 BPM
WBC # BLD AUTO: 5.71 THOUSAND/UL (ref 4.31–10.16)

## 2022-11-01 RX ORDER — HEPARIN SODIUM 10000 [USP'U]/100ML
11.1 INJECTION, SOLUTION INTRAVENOUS
Status: DISCONTINUED | OUTPATIENT
Start: 2022-11-01 | End: 2022-11-01

## 2022-11-01 RX ORDER — HEPARIN SODIUM 10000 [USP'U]/100ML
11.1 INJECTION, SOLUTION INTRAVENOUS
Status: DISCONTINUED | OUTPATIENT
Start: 2022-11-01 | End: 2022-11-02

## 2022-11-01 RX ADMIN — INSULIN LISPRO 4 UNITS: 100 INJECTION, SOLUTION INTRAVENOUS; SUBCUTANEOUS at 16:15

## 2022-11-01 RX ADMIN — APIXABAN 5 MG: 5 TABLET, FILM COATED ORAL at 08:39

## 2022-11-01 RX ADMIN — METOPROLOL SUCCINATE 50 MG: 50 TABLET, EXTENDED RELEASE ORAL at 08:39

## 2022-11-01 RX ADMIN — HEPARIN SODIUM 11.1 UNITS/KG/HR: 10000 INJECTION, SOLUTION INTRAVENOUS at 18:14

## 2022-11-01 RX ADMIN — DOCUSATE SODIUM 100 MG: 100 CAPSULE, LIQUID FILLED ORAL at 08:39

## 2022-11-01 RX ADMIN — Medication 1 CAPSULE: at 08:39

## 2022-11-01 RX ADMIN — INSULIN GLARGINE 25 UNITS: 100 INJECTION, SOLUTION SUBCUTANEOUS at 21:44

## 2022-11-01 RX ADMIN — MONTELUKAST 10 MG: 10 TABLET, FILM COATED ORAL at 21:43

## 2022-11-01 RX ADMIN — PREGABALIN 100 MG: 100 CAPSULE ORAL at 18:14

## 2022-11-01 RX ADMIN — PRAVASTATIN SODIUM 80 MG: 80 TABLET ORAL at 16:15

## 2022-11-01 RX ADMIN — INSULIN LISPRO 1 UNITS: 100 INJECTION, SOLUTION INTRAVENOUS; SUBCUTANEOUS at 21:45

## 2022-11-01 RX ADMIN — INSULIN LISPRO 6 UNITS: 100 INJECTION, SOLUTION INTRAVENOUS; SUBCUTANEOUS at 12:02

## 2022-11-01 RX ADMIN — INSULIN LISPRO 4 UNITS: 100 INJECTION, SOLUTION INTRAVENOUS; SUBCUTANEOUS at 08:38

## 2022-11-01 RX ADMIN — ACETAMINOPHEN 650 MG: 325 TABLET, FILM COATED ORAL at 13:27

## 2022-11-01 RX ADMIN — REMDESIVIR 100 MG: 100 INJECTION, POWDER, LYOPHILIZED, FOR SOLUTION INTRAVENOUS at 21:44

## 2022-11-01 RX ADMIN — PREGABALIN 100 MG: 100 CAPSULE ORAL at 08:39

## 2022-11-01 NOTE — PROGRESS NOTES
[address]  Progress Note Soundra Merlin 1950, 81 y o  female MRN: [id number]  Unit/Bed#: [address] [place] [address] Encounter: [id number]  Primary Care Provider: Africa Hamilton MD   Date and time admitted to hospital: 10/31/2022  3:27 PM    * COVID-19  Assessment & Plan  Patient presented with generalized weakness, body aches, fever cough with congestion  Tested for COVID positive on October 29, 2022  Patient reported hypoxia at home but O2 saturation has been in high 90s on room air in ER  Troponin was 7  ProBNP 718  As patient is high risk candidate given her comorbidities including age more than 74, morbid obesity, diabetes, CKD patient was started on remdesivir 200 milligram IV daily followed by 100 milligram IV daily   D-dimer 0 91, CK 41, procalcitonin level 0 11, CRP 73 4  Encourage incentive spirometry and ambulate as tolerated  Patient on room air  Following COVID mild pathway and D-dimer of 0 91, will hold Eliquis and  start heparin drip  Continue to monitor        Acute kidney injury superimposed on CKD [facility])  Assessment & Plan  Lab Results   Component Value Date    EGFR 29 11/01/2022    EGFR 21 10/31/2022    EGFR 30 07/20/2022    CREATININE 1 74 (H) 11/01/2022    CREATININE 2 29 (H) 10/31/2022    CREATININE 1 69 (H) 07/20/2022   Baseline creatinine anywhere from 1 7-1 9  Creatinine mildly elevated at 2 29 upon admission  Hold torsemide  Echo from March 22 showed EF of 53% with borderline concentric hypertrophy   Gentle IV hydration with normal saline at 50 mL/hour x1 liter  Avoid nephrotoxic agents and hypotension  Follow-up BMP in a m     KATRINA (obstructive sleep apnea)  Assessment & Plan  Noncompliant with BiPAP  Patient uses oxygen as needed    Obesity hypoventilation syndrome ([address] )  Assessment & Plan  Patient uses oxygen at night  Continue oxygen supplementation p r n      Chronic diastolic heart failure [facility])  Assessment & Plan  Wt Readings from Last 3 Encounters:   10/31/22 112 kg (246 lb)   10/31/22 112 kg (246 lb)   09/09/22 112 kg (246 lb)     Continue metoprolol 50 milligram p o  Daily  Hold torsemide  Continue Jardiance  Continue cardiac diet        Diabetes mellitus, type 2 [facility])  Assessment & Plan  Lab Results   Component Value Date    HGBA1C 9 0 (H) 07/20/2022       Recent Labs     10/31/22  2141 11/01/22  0727 11/01/22  1136 11/01/22  1613   POCGLU 302* 215* 269* 249*       Blood Sugar Average: Last 72 hrs:  (P) 258 75 patient is on Toujeo insulin which will be substituted with Lantus 35 units daily at bedtime  Patient been Humalog sliding scale with Accu-Cheks q a c  And HS  Blood sugar noted to be elevated in the ED  Patient will be on diabetic diet  Continue Jardiance    Mixed hyperlipidemia  Assessment & Plan  Crestor substituted with Pravachol    Paroxysmal atrial fibrillation (H[facility])  Assessment & Plan  History of proximal atrial fibrillation   EKG normal sinus rhythm  Continue metoprolol 50 milligram p o  Daily   Holding Eliquis will start heparin drip          VTE Pharmacologic Prophylaxis: VTE Score: 5 High Risk (Score >/= 5) - Pharmacological DVT Prophylaxis Ordered: heparin drip  Sequential Compression Devices Ordered  Patient Centered Rounds: I performed bedside rounds with nursing staff today  Discussions with Specialists or Other Care Team Provider: Yes    Education and Discussions with Family / Patient: Patient declined call to   Time Spent for Care: 30 minutes  More than 50% of total time spent on counseling and coordination of care as described above  Current Length of Stay: 1 day(s)  Current Patient Status: Inpatient   Certification Statement: The patient will continue to require additional inpatient hospital stay due to COVID-19 infection  Discharge Plan: Anticipate discharge in 48 hrs to home  Code Status: Level 1 - Full Code    Subjective:   Patient seen and examined in bed    Reports headache, denies shortness of head, chest pain, abdominal pain, nausea or vomiting    Objective:     Vitals:   Temp (24hrs), Av 5 °F (36 9 °C), Min:98 2 °F (36 8 °C), Max:98 9 °F (37 2 °C)    Temp:  [98 2 °F (36 8 °C)-98 9 °F (37 2 °C)] 98 7 °F (37 1 °C)  HR:  [60-73] 73  Resp:  [17-21] 18  BP: (128-160)/() 144/67  SpO2:  [94 %-99 %] 95 %  Body mass index is 44 99 kg/m²  Input and Output Summary (last 24 hours):   No intake or output data in the 24 hours ending 22 1652    Physical Exam:   Physical Exam  Constitutional:       Appearance: Normal appearance  She is obese  HENT:      Head: Normocephalic  Nose: Nose normal       Mouth/Throat:      Mouth: Mucous membranes are moist    Eyes:      General: No scleral icterus  Cardiovascular:      Rate and Rhythm: Normal rate  Heart sounds: Normal heart sounds  Pulmonary:      Effort: Pulmonary effort is normal  No respiratory distress  Breath sounds: Normal breath sounds  Abdominal:      General: Bowel sounds are normal  There is no distension  Musculoskeletal:         General: Normal range of motion  Cervical back: Normal range of motion  Skin:     General: Skin is dry  Neurological:      Mental Status: She is alert and oriented to person, place, and time     Psychiatric:         Behavior: Behavior normal          Judgment: Judgment normal          Additional Data:     Labs:  Results from last 7 days   Lab Units 22  0450   WBC Thousand/uL 5 71   HEMOGLOBIN g/dL 9 9*   HEMATOCRIT % 32 1*   PLATELETS Thousands/uL 153   NEUTROS PCT % 49   LYMPHS PCT % 35   MONOS PCT % 14*   EOS PCT % 1     Results from last 7 days   Lab Units 22  0450   SODIUM mmol/L 142   POTASSIUM mmol/L 3 7   CHLORIDE mmol/L 105   CO2 mmol/L 30   BUN mg/dL 51*   CREATININE mg/dL 1 74*   ANION GAP mmol/L 7   CALCIUM mg/dL 7 4*   ALBUMIN g/dL 2 4*   TOTAL BILIRUBIN mg/dL 0 12*   ALK PHOS U/L 56   ALT U/L 15   AST U/L 15   GLUCOSE RANDOM mg/dL 230*         Results from last 7 days   Lab Units 11/01/22  1613 11/01/22  1136 11/01/22  0727 10/31/22  2141   POC GLUCOSE mg/dl 249* 269* 215* 302*         Results from last 7 days   Lab Units 11/01/22  0450 10/31/22  2051   PROCALCITONIN ng/ml 0 11 0 11       Lines/Drains:  Invasive Devices  Report    Peripheral Intravenous Line  Duration           Peripheral IV Left;Proximal;Ventral (anterior) Forearm -- days    Peripheral IV 10/31/22 Left Forearm 1 day                      Imaging: No pertinent imaging reviewed      Recent Cultures (last 7 days):         Last 24 Hours Medication List:   Current Facility-Administered Medications   Medication Dose Route Frequency Provider Last Rate   • acetaminophen  650 mg Oral Q6H PRN Kat Gonzales MD     • albuterol  2 puff Inhalation Q6H PRN Kat Gonzales MD     • ammonium lactate   Topical Daily Kat Gonzales MD     • b complex-vitamin C-folic acid  1 capsule Oral Daily Kat Gonzales MD     • docusate sodium  100 mg Oral Daily Kat Gonzales MD     • Empagliflozin  10 mg Oral QAM Kat Gonzales MD     • heparin (porcine)  11 1 Units/kg/hr (Order-Specific) Intravenous Titrated RK Araya     • insulin glargine  25 Units Subcutaneous HS Kat Gonzales MD     • insulin lispro  1-5 Units Subcutaneous HS Kat Gonzales MD     • insulin lispro  2-12 Units Subcutaneous TID AC Kat Gonzales MD     • metoprolol succinate  50 mg Oral Daily Kat Gonzales MD     • montelukast  10 mg Oral HS Kat Gonzales MD     • ondansetron  4 mg Intravenous Q6H PRN Kat Gonzales MD     • pravastatin  80 mg Oral Daily With Jimmy Francois MD     • pregabalin  100 mg Oral BID Kat Gonzales MD     • remdesivir  100 mg Intravenous Q24H Kat Gonzales MD     • sodium chloride  50 mL/hr Intravenous Continuous Kat Gonzales MD 50 mL/hr (10/31/22 2001)        Today, Patient Was Seen By: Bud Carpenter    **Please Note: This note may have been constructed using a voice recognition system  **

## 2022-11-01 NOTE — ASSESSMENT & PLAN NOTE
Lab Results   Component Value Date    EGFR 29 11/01/2022    EGFR 21 10/31/2022    EGFR 30 07/20/2022    CREATININE 1 74 (H) 11/01/2022    CREATININE 2 29 (H) 10/31/2022    CREATININE 1 69 (H) 07/20/2022   Baseline creatinine anywhere from 1 7-1 9  Creatinine mildly elevated at 2 29 upon admission  Hold torsemide  Echo from March 22 showed EF of 53% with borderline concentric hypertrophy   Gentle IV hydration with normal saline at 50 mL/hour x1 liter  Avoid nephrotoxic agents and hypotension  Follow-up BMP in [place]

## 2022-11-01 NOTE — ASSESSMENT & PLAN NOTE
Patient presented with generalized weakness, body aches, fever cough with congestion  Tested for COVID positive on October 29, 2022  Patient reported hypoxia at home but O2 saturation has been in high 90s on room air in ER  Troponin was 7  ProBNP 718  As patient is high risk candidate given her comorbidities including age more than 72, morbid obesity, diabetes, CKD patient was started on remdesivir 200 milligram IV daily followed by 100 milligram IV daily   D-dimer 0 91, CK 41, procalcitonin level 0 11, CRP 73 4  Encourage incentive spirometry and ambulate as tolerated  Patient on room air  Following COVID mild pathway and D-dimer of 0 91, will hold Eliquis and  start heparin drip  Continue to monitor

## 2022-11-01 NOTE — PLAN OF CARE
Problem: Potential for Falls  Goal: Patient will remain free of falls  Description: INTERVENTIONS:  - Educate patient/family on patient safety including physical limitations  - Instruct patient to call for assistance with activity   - Consult OT/PT to assist with strengthening/mobility   - Keep Call bell within reach  - Keep bed low and locked with side rails adjusted as appropriate  - Keep care items and personal belongings within reach  - Initiate and maintain comfort rounds  - Apply yellow socks and bracelet for high fall risk patients  - Consider moving patient to room near nurses station  Outcome: Progressing     Problem: MOBILITY - ADULT  Goal: Maintain or return to baseline ADL function  Description: INTERVENTIONS:  -  Assess patient's ability to carry out ADLs; assess patient's baseline for ADL function and identify physical deficits which impact ability to perform ADLs (bathing, care of mouth/teeth, toileting, grooming, dressing, etc )  - Assess/evaluate cause of self-care deficits   - Assess range of motion  - Assess patient's mobility; develop plan if impaired  - Assess patient's need for assistive devices and provide as appropriate  - Encourage maximum independence but intervene and supervise when necessary  - Involve family in performance of ADLs  - Assess for home care needs following discharge   - Consider OT consult to assist with ADL evaluation and planning for discharge  - Provide patient education as appropriate  Outcome: Progressing  Goal: Maintains/Returns to pre admission functional level  Description: INTERVENTIONS:  - Perform BMAT or MOVE assessment daily    - Set and communicate daily mobility goal to care team and patient/family/caregiver     - Collaborate with rehabilitation services on mobility goals if consulted  - Out of bed for toileting  - Record patient progress and toleration of activity level   Outcome: Progressing     Problem: PAIN - ADULT  Goal: Verbalizes/displays adequate comfort level or baseline comfort level  Description: Interventions:  - Encourage patient to monitor pain and request assistance  - Assess pain using appropriate pain scale  - Administer analgesics based on type and severity of pain and evaluate response  - Implement non-pharmacological measures as appropriate and evaluate response  - Consider cultural and social influences on pain and pain management  - Notify physician/advanced practitioner if interventions unsuccessful or patient reports new pain  Outcome: Progressing     Problem: INFECTION - ADULT  Goal: Absence or prevention of progression during hospitalization  Description: INTERVENTIONS:  - Assess and monitor for signs and symptoms of infection  - Monitor lab/diagnostic results  - Monitor all insertion sites, i e  indwelling lines, tubes, and drains  - Monitor endotracheal if appropriate and nasal secretions for changes in amount and color  - [place] appropriate cooling/warming therapies per order  - Administer medications as ordered  - Instruct and encourage patient and family to use good hand hygiene technique  - Identify and instruct in appropriate isolation precautions for identified infection/condition  Outcome: Progressing  Goal: Absence of fever/infection during neutropenic period  Description: INTERVENTIONS:  - Monitor WBC    Outcome: Progressing     Problem: SAFETY ADULT  Goal: Patient will remain free of falls  Description: INTERVENTIONS:  - Educate patient/family on patient safety including physical limitations  - Instruct patient to call for assistance with activity   - Consult OT/PT to assist with strengthening/mobility   - Keep Call bell within reach  - Keep bed low and locked with side rails adjusted as appropriate  - Keep care items and personal belongings within reach  - Initiate and maintain comfort rounds  - Make Fall Risk Sign visible to staff  - Apply yellow socks and bracelet for high fall risk patients  - Consider moving patient to room near nurses station  Outcome: Progressing  Goal: Maintain or return to baseline ADL function  Description: INTERVENTIONS:  -  Assess patient's ability to carry out ADLs; assess patient's baseline for ADL function and identify physical deficits which impact ability to perform ADLs (bathing, care of mouth/teeth, toileting, grooming, dressing, etc )  - Assess/evaluate cause of self-care deficits   - Assess range of motion  - Assess patient's mobility; develop plan if impaired  - Assess patient's need for assistive devices and provide as appropriate  - Encourage maximum independence but intervene and supervise when necessary  - Involve family in performance of ADLs  - Assess for home care needs following discharge   - Consider OT consult to assist with ADL evaluation and planning for discharge  - Provide patient education as appropriate  Outcome: Progressing  Goal: Maintains/Returns to pre admission functional level  Description: INTERVENTIONS:  - Perform BMAT or MOVE assessment daily    - Set and communicate daily mobility goal to care team and patient/family/caregiver     - Collaborate with rehabilitation services on mobility goals if consulted  - Out of bed for toileting  - Record patient progress and toleration of activity level   Outcome: Progressing     Problem: DISCHARGE PLANNING  Goal: Discharge to home or other facility with appropriate resources  Description: INTERVENTIONS:  - Identify barriers to discharge w/patient and caregiver  - Arrange for needed discharge resources and transportation as appropriate  - Identify discharge learning needs (meds, wound care, etc )  - Arrange for interpretive services to assist at discharge as needed  - Refer to Case Management Department for coordinating discharge planning if the patient needs post-hospital services based on physician/advanced practitioner order or complex needs related to functional status, cognitive ability, or social support system  Outcome: Progressing     Problem: Knowledge Deficit  Goal: Patient/family/caregiver demonstrates understanding of disease process, treatment plan, medications, and discharge instructions  Description: Complete learning assessment and assess knowledge base    Interventions:  - Provide teaching at level of understanding  - Provide teaching via preferred learning methods  Outcome: Progressing     Problem: Prexisting or High Potential for Compromised Skin Integrity  Goal: Skin integrity is maintained or improved  Description: INTERVENTIONS:  - Identify patients at risk for skin breakdown  - Assess and monitor skin integrity  - Assess and monitor nutrition and hydration status  - Monitor labs   - Assess for incontinence   - Turn and reposition patient  - Assist with mobility/ambulation  - Relieve pressure over bony prominences  - Avoid friction and shearing  - Provide appropriate hygiene as needed including keeping skin clean and dry  - Evaluate need for skin moisturizer/barrier cream  - Collaborate with interdisciplinary team   - Patient/family teaching  - Consider wound care consult   Outcome: Progressing

## 2022-11-01 NOTE — PLAN OF CARE
Problem: Potential for Falls  Goal: Patient will remain free of falls  Description: INTERVENTIONS:  - Educate patient/family on patient safety including physical limitations  - Instruct patient to call for assistance with activity   - Consult OT/PT to assist with strengthening/mobility   - Keep Call bell within reach  - Keep bed low and locked with side rails adjusted as appropriate  - Keep care items and personal belongings within reach  - Initiate and maintain comfort rounds  - Make Fall Risk Sign visible to staff  - Offer Toileting every 2 Hours, in advance of need  - Initiate/Maintain bed alarm  - Apply yellow socks and bracelet for high fall risk patients  - Consider moving patient to room near nurses station  Outcome: Progressing     Problem: MOBILITY - ADULT  Goal: Maintain or return to baseline ADL function  Description: INTERVENTIONS:  -  Assess patient's ability to carry out ADLs; assess patient's baseline for ADL function and identify physical deficits which impact ability to perform ADLs (bathing, care of mouth/teeth, toileting, grooming, dressing, etc )  - Assess/evaluate cause of self-care deficits   - Assess range of motion  - Assess patient's mobility; develop plan if impaired  - Assess patient's need for assistive devices and provide as appropriate  - Encourage maximum independence but intervene and supervise when necessary  - Involve family in performance of ADLs  - Assess for home care needs following discharge   - Consider OT consult to assist with ADL evaluation and planning for discharge  - Provide patient education as appropriate  Outcome: Progressing  Goal: Maintains/Returns to pre admission functional level  Description: INTERVENTIONS:  - Perform BMAT or MOVE assessment daily    - Set and communicate daily mobility goal to care team and patient/family/caregiver     - Collaborate with rehabilitation services on mobility goals if consulted  - Out of bed for toileting  - Record patient progress and toleration of activity level   Outcome: Progressing     Problem: PAIN - ADULT  Goal: Verbalizes/displays adequate comfort level or baseline comfort level  Description: Interventions:  - Encourage patient to monitor pain and request assistance  - Assess pain using appropriate pain scale  - Administer analgesics based on type and severity of pain and evaluate response  - Implement non-pharmacological measures as appropriate and evaluate response  - Consider cultural and social influences on pain and pain management  - Notify physician/advanced practitioner if interventions unsuccessful or patient reports new pain  Outcome: Progressing     Problem: INFECTION - ADULT  Goal: Absence or prevention of progression during hospitalization  Description: INTERVENTIONS:  - Assess and monitor for signs and symptoms of infection  - Monitor lab/diagnostic results  - Monitor all insertion sites, i e  indwelling lines, tubes, and drains  - Monitor endotracheal if appropriate and nasal secretions for changes in amount and color  - [place] appropriate cooling/warming therapies per order  - Administer medications as ordered  - Instruct and encourage patient and family to use good hand hygiene technique  - Identify and instruct in appropriate isolation precautions for identified infection/condition  Outcome: Progressing  Goal: Absence of fever/infection during neutropenic period  Description: INTERVENTIONS:  - Monitor WBC    Outcome: Progressing     Problem: SAFETY ADULT  Goal: Patient will remain free of falls  Description: INTERVENTIONS:  - Educate patient/family on patient safety including physical limitations  - Instruct patient to call for assistance with activity   - Consult OT/PT to assist with strengthening/mobility   - Keep Call bell within reach  - Keep bed low and locked with side rails adjusted as appropriate  - Keep care items and personal belongings within reach  - Initiate and maintain comfort rounds  - Make Fall Risk Sign visible to staff  - Offer Toileting every 2 Hours, in advance of need  - Initiate/Maintain bed alarm  - Apply yellow socks and bracelet for high fall risk patients  - Consider moving patient to room near nurses station  Outcome: Progressing  Goal: Maintain or return to baseline ADL function  Description: INTERVENTIONS:  -  Assess patient's ability to carry out ADLs; assess patient's baseline for ADL function and identify physical deficits which impact ability to perform ADLs (bathing, care of mouth/teeth, toileting, grooming, dressing, etc )  - Assess/evaluate cause of self-care deficits   - Assess range of motion  - Assess patient's mobility; develop plan if impaired  - Assess patient's need for assistive devices and provide as appropriate  - Encourage maximum independence but intervene and supervise when necessary  - Involve family in performance of ADLs  - Assess for home care needs following discharge   - Consider OT consult to assist with ADL evaluation and planning for discharge  - Provide patient education as appropriate  Outcome: Progressing  Goal: Maintains/Returns to pre admission functional level  Description: INTERVENTIONS:  - Perform BMAT or MOVE assessment daily    - Set and communicate daily mobility goal to care team and patient/family/caregiver     - Collaborate with rehabilitation services on mobility goals if consulted  - Out of bed for toileting  - Record patient progress and toleration of activity level   Outcome: Progressing     Problem: DISCHARGE PLANNING  Goal: Discharge to home or other facility with appropriate resources  Description: INTERVENTIONS:  - Identify barriers to discharge w/patient and caregiver  - Arrange for needed discharge resources and transportation as appropriate  - Identify discharge learning needs (meds, wound care, etc )  - Arrange for interpretive services to assist at discharge as needed  - Refer to Case Management Department for coordinating discharge planning if the patient needs post-hospital services based on physician/advanced practitioner order or complex needs related to functional status, cognitive ability, or social support system  Outcome: Progressing     Problem: Knowledge Deficit  Goal: Patient/family/caregiver demonstrates understanding of disease process, treatment plan, medications, and discharge instructions  Description: Complete learning assessment and assess knowledge base    Interventions:  - Provide teaching at level of understanding  - Provide teaching via preferred learning methods  Outcome: Progressing     Problem: Prexisting or High Potential for Compromised Skin Integrity  Goal: Skin integrity is maintained or improved  Description: INTERVENTIONS:  - Identify patients at risk for skin breakdown  - Assess and monitor skin integrity  - Assess and monitor nutrition and hydration status  - Monitor labs   - Assess for incontinence   - Turn and reposition patient  - Assist with mobility/ambulation  - Relieve pressure over bony prominences  - Avoid friction and shearing  - Provide appropriate hygiene as needed including keeping skin clean and dry  - Evaluate need for skin moisturizer/barrier cream  - Collaborate with interdisciplinary team   - Patient/family teaching  - Consider wound care consult   Outcome: Progressing

## 2022-11-01 NOTE — PLAN OF CARE
Problem: PHYSICAL THERAPY ADULT  Goal: Performs mobility at highest level of function for planned discharge setting  See evaluation for individualized goals  Description: Treatment/Interventions: ADL retraining, Functional transfer training, LE strengthening/ROM, Elevations, Therapeutic exercise, Endurance training, Patient/family training, Equipment eval/education, Bed mobility, Gait training, Compensatory technique education  Equipment Recommended:  (pt has all DME needs)       See flowsheet documentation for full assessment, interventions and recommendations  Note:    Problem List: Decreased strength, Decreased range of motion, Decreased endurance, Impaired balance, Decreased mobility, Decreased safety awareness, Pain  Assessment: Patient seen for Physical Therapy evaluation  Patient admitted with COVID-19  Comorbidities affecting patient's physical performance include: PAF, HLD, DM2, chronic diastolic HF, obesity hypoventilation syndrome, SEBASTIAN in CKD, anemia, R TKA, Fx L humerus, arthritis  Personal factors affecting patient at time of initial evaluation include: lives in one story house, stairs to enter home, inability to navigate community distances, inability to navigate level surfaces without external assistance and inability to perform dynamic tasks in community  Prior to admission, patient was independent with functional mobility without assistive device, independent with ADLS, requiring assist for IADLS, living with niece in a one level home with 3 steps to enter and ambulating household distance  Please find objective findings from Physical Therapy assessment regarding body systems outlined above with impairments and limitations including weakness, decreased ROM, impaired balance, decreased endurance, gait deviations, pain, decreased activity tolerance, decreased functional mobility tolerance, decreased safety awareness and fall risk    The Barthel Index was used as a functional outcome tool presenting with a score of Barthel Index Score: 55 today indicating marked limitations of functional mobility and ADLS  Patient's clinical presentation is currently unstable/unpredictable as seen in patient's presentation of vital sign response, changing level of pain, increased fall risk, new onset of impairment of functional mobility, decreased endurance and new onset of weakness  Pt would benefit from continued Physical Therapy treatment to address deficits as defined above and maximize level of functional mobility  As demonstrated by objective findings, the assigned level of complexity for this evaluation is high  The patient's AM-PAC Basic Mobility Inpatient Short Form Raw Score is 17  A Raw score of greater than 16 suggests the patient may benefit from discharge to home  Please also refer to the recommendation of the Physical Therapist for safe discharge planning  PT Discharge Recommendation: Home with home health rehabilitation    See flowsheet documentation for full assessment

## 2022-11-01 NOTE — ASSESSMENT & PLAN NOTE
History of proximal atrial fibrillation   EKG normal sinus rhythm  Continue metoprolol 50 milligram p o   Daily   Holding Eliquis will start heparin drip

## 2022-11-01 NOTE — PHYSICAL THERAPY NOTE
PHYSICAL THERAPY EVALUATION/TREATMENT     22 3130   Note Type   Note type Evaluation   Pain Assessment   Pain Assessment Tool 0-10   Pain Score 4   Pain Location/Orientation Location: Head  (Headache)   Restrictions/Precautions   Other Precautions Contact/isolation; Airborne/isolation; Fall Risk;Bed Alarm; Chair Alarm   Home Living   Type of [address] [place] [address] One level;Stairs to enter with rails  (3 JESS)   Home Equipment [place]; Hemiwalker; Wheelchair-manual  (RW; home O2 2 L at night)   Prior Function   Level of [place] Independent with ADLs; Independent with functional mobility; Needs assistance with IADLS   Lives With Family  (Niece)   Receives Help From Family   Comments Pt ambulates without an assistive device prior to admission  Pt not driving PTA   General   Additional Pertinent History Pt is admitted with generalized weakness, body aches, fever, cough and congestion  Pt positive for COVID 2 days ago   Family/Caregiver Present No   Cognition   Overall Cognitive Status WFL;Pt ID by name and   Confirmed by ID bracelet and RN  Arousal/Participation Cooperative   Orientation Level Oriented X4   Following Commands Follows multistep commands with increased time or repetition   Subjective   Subjective "tired"   RLE Assessment   RLE Assessment WFL  (3 to 3+/5)   LLE Assessment   LLE Assessment WFL  (3 to 3+/5)   Bed Mobility   Supine to Sit 5  Supervision   Additional items Verbal cues;HOB elevated; Increased time required;Assist x 1;Bedrails   Transfers   Sit to Stand 5  Supervision   Additional items Verbal cues; Increased time required;Assist x 1   Stand to Sit 5  Supervision   Additional items Verbal cues; Increased time required;Assist x 1   Ambulation/Elevation   Gait pattern Forward Flexion; Short stride; Step to  (decreased gait speed)   Gait Assistance 4  Minimal assist   Additional items Assist x 1;Verbal cues; Tactile cues   Assistive Device None  (minimal hand hold A)   Distance 15 feet with change in direction in pt's room   Balance   Static Sitting Good   Dynamic Sitting Fair +   Static Standing Fair   Dynamic Standing Fair -   Ambulatory Fair -   Activity Tolerance   Activity Tolerance Patient limited by fatigue;Patient limited by pain;Treatment limited secondary to medical complications (Comment)  ("tired")   Assessment   Problem List Decreased strength;Decreased range of motion;Decreased endurance; Impaired balance;Decreased mobility; Decreased safety awareness;Pain   Assessment Patient seen for Physical Therapy evaluation  Patient admitted with COVID-19  Comorbidities affecting patient's physical performance include: PAF, HLD, DM2, chronic diastolic HF, obesity hypoventilation syndrome, SEBASTIAN in CKD, anemia, R TKA, Fx L humerus, arthritis  Personal factors affecting patient at time of initial evaluation include: lives in one story house, stairs to enter home, inability to navigate community distances, inability to navigate level surfaces without external assistance and inability to perform dynamic tasks in community  Prior to admission, patient was independent with functional mobility without assistive device, independent with ADLS, requiring assist for IADLS, living with niece in a one level home with 3 steps to enter and ambulating household distance  Please find objective findings from Physical Therapy assessment regarding body systems outlined above with impairments and limitations including weakness, decreased ROM, impaired balance, decreased endurance, gait deviations, pain, decreased activity tolerance, decreased functional mobility tolerance, decreased safety awareness and fall risk  The Barthel Index was used as a functional outcome tool presenting with a score of Barthel Index Score: 55 today indicating marked limitations of functional mobility and ADLS    Patient's clinical presentation is currently unstable/unpredictable as seen in patient's presentation of vital sign response, changing level of pain, increased fall risk, new onset of impairment of functional mobility, decreased endurance and new onset of weakness  Pt would benefit from continued Physical Therapy treatment to address deficits as defined above and maximize level of functional mobility  As demonstrated by objective findings, the assigned level of complexity for this evaluation is high  The patient's [facility] Basic Mobility Inpatient Short Form Raw Score is 17  A Raw score of greater than 16 suggests the patient may benefit from discharge to home  Please also refer to the recommendation of the Physical Therapist for safe discharge planning  Goals   Patient Goals to feel better and go home   STG Expiration Date 11/10/22   Short Term Goal #1 Patient will: Increase bilateral LE strength 1 grade to facilitate independent mobility, Perform all bed mobility tasks independently to decrease fall risk factors, Perform all transfers independently to improve independence, Ambulate at least 50 ft w/ LRD of no AD w/o SpO2 decreasing below 90%, Navigate 3 stairs w/ supervision with unilateral handrail to facilitate return to previous living environment, Increase ambulatory balance 1 grade to decrease risk for falls, Tolerate at least 15 consecutive minutes of activity to demonstrate improved activity tolerance and endurance and Tolerate 3 hr OOB to faciliate upright tolerance   Plan   Treatment/Interventions ADL retraining;Functional transfer training;LE strengthening/ROM; Elevations; Therapeutic exercise; Endurance training;Patient/family training;Equipment eval/education; Bed mobility;Gait training; Compensatory technique education   PT Frequency Other (Comment)  (5x/wk)   Recommendation   PT Discharge Recommendation Home with home health rehabilitation   Equipment Recommended   (pt has all DME needs)   [facility] Basic Mobility Inpatient   Turning in Bed Without Bedrails 3   Lying on Back to Sitting on Edge of Flat Bed 3   Moving Bed to Chair 3 Standing Up From Chair 3   Walk in Room 3   Climb 3-5 Stairs 2   Basic Mobility Inpatient Raw Score 17   Basic Mobility Standardized Score 39 67   Highest Level Of Mobility   -[facility] Goal 5: Stand one or more mins   -[facility] Achieved 6: Walk 10 steps or more   Barthel Index   Feeding 10   Bathing 0   Grooming Score 5   Dressing Score 5   Bladder Score 5   Bowels Score 10   Toilet Use Score 5   Transfers (Bed/Chair) Score 10   Mobility (Level Surface) Score 0   Stairs Score 5   Barthel Index Score 55   Additional Treatment Session   Start Time 1330   End Time 1340   Treatment Assessment S: "I'm just so tired" O: Pt trans sit to stand with S and amb with a RW and S 15 feet with change in direction  Pt trans stand to sit with S  Rest  Pt then amb w/out the RW 10 feet to and from the bathroom  Pt is S to trans to and from the toilet and is independent with hygiene after urination  A: Pt with limited tolerance to bed mob, trans, amb with and w/out the RW and functional mobility/toileting due to generalized fatigue and "tired " Pt will cont to benefit from skilled PT services to increase pt's strength and mobility and to return pt to her PLOF  Pt is appropriate for home PT services when medically stable for dc  End of Consult   Patient Position at End of Consult Supine; All needs within reach   [employer] Insurance Number  [address] [place] [address] [id number]

## 2022-11-01 NOTE — ASSESSMENT & PLAN NOTE
Lab Results   Component Value Date    HGBA1C 9 0 (H) 07/20/2022       Recent Labs     10/31/22  2141 11/01/22  0727 11/01/22  1136 11/01/22  1613   POCGLU 302* 215* 269* 249*       Blood Sugar Average: Last 72 hrs:  (P) 258 75 patient is on Toujeo insulin which will be substituted with Lantus 35 units daily at bedtime  Patient been Humalog sliding scale with Accu-Cheks q a c   And HS  Blood sugar noted to be elevated in the ED  Patient will be on diabetic diet  Continue Jardiance

## 2022-11-02 LAB
ANION GAP SERPL CALCULATED.3IONS-SCNC: 5 MMOL/L (ref 4–13)
APTT PPP: 73 SECONDS (ref 23–37)
APTT PPP: 94 SECONDS (ref 23–37)
BUN SERPL-MCNC: 48 MG/DL (ref 5–25)
CALCIUM SERPL-MCNC: 7.9 MG/DL (ref 8.3–10.1)
CHLORIDE SERPL-SCNC: 109 MMOL/L (ref 96–108)
CO2 SERPL-SCNC: 30 MMOL/L (ref 21–32)
CREAT SERPL-MCNC: 1.35 MG/DL (ref 0.6–1.3)
D DIMER PPP FEU-MCNC: 0.54 UG/ML FEU
ERYTHROCYTE [DISTWIDTH] IN BLOOD BY AUTOMATED COUNT: 14.6 % (ref 11.6–15.1)
GFR SERPL CREATININE-BSD FRML MDRD: 39 ML/MIN/1.73SQ M
GLUCOSE SERPL-MCNC: 105 MG/DL (ref 65–140)
GLUCOSE SERPL-MCNC: 106 MG/DL (ref 65–140)
GLUCOSE SERPL-MCNC: 188 MG/DL (ref 65–140)
GLUCOSE SERPL-MCNC: 208 MG/DL (ref 65–140)
GLUCOSE SERPL-MCNC: 274 MG/DL (ref 65–140)
HCT VFR BLD AUTO: 33.9 % (ref 34.8–46.1)
HGB BLD-MCNC: 10.6 G/DL (ref 11.5–15.4)
MCH RBC QN AUTO: 29 PG (ref 26.8–34.3)
MCHC RBC AUTO-ENTMCNC: 31.3 G/DL (ref 31.4–37.4)
MCV RBC AUTO: 93 FL (ref 82–98)
PLATELET # BLD AUTO: 132 THOUSANDS/UL (ref 149–390)
PMV BLD AUTO: 11.1 FL (ref 8.9–12.7)
POTASSIUM SERPL-SCNC: 4.4 MMOL/L (ref 3.5–5.3)
RBC # BLD AUTO: 3.65 MILLION/UL (ref 3.81–5.12)
SODIUM SERPL-SCNC: 144 MMOL/L (ref 135–147)
WBC # BLD AUTO: 5.66 THOUSAND/UL (ref 4.31–10.16)

## 2022-11-02 RX ORDER — FLUTICASONE PROPIONATE 50 MCG
1 SPRAY, SUSPENSION (ML) NASAL 2 TIMES DAILY
Status: DISCONTINUED | OUTPATIENT
Start: 2022-11-02 | End: 2022-11-03 | Stop reason: HOSPADM

## 2022-11-02 RX ORDER — INSULIN GLARGINE 100 [IU]/ML
22 INJECTION, SOLUTION SUBCUTANEOUS
Status: DISCONTINUED | OUTPATIENT
Start: 2022-11-02 | End: 2022-11-03 | Stop reason: HOSPADM

## 2022-11-02 RX ORDER — HEPARIN SODIUM 10000 [USP'U]/100ML
3-20 INJECTION, SOLUTION INTRAVENOUS
Status: DISCONTINUED | OUTPATIENT
Start: 2022-11-02 | End: 2022-11-02

## 2022-11-02 RX ORDER — GUAIFENESIN 600 MG/1
600 TABLET, EXTENDED RELEASE ORAL EVERY 12 HOURS SCHEDULED
Status: DISCONTINUED | OUTPATIENT
Start: 2022-11-02 | End: 2022-11-03 | Stop reason: HOSPADM

## 2022-11-02 RX ADMIN — INSULIN LISPRO 2 UNITS: 100 INJECTION, SOLUTION INTRAVENOUS; SUBCUTANEOUS at 21:15

## 2022-11-02 RX ADMIN — INSULIN LISPRO 4 UNITS: 100 INJECTION, SOLUTION INTRAVENOUS; SUBCUTANEOUS at 16:47

## 2022-11-02 RX ADMIN — APIXABAN 5 MG: 5 TABLET, FILM COATED ORAL at 10:33

## 2022-11-02 RX ADMIN — GUAIFENESIN 600 MG: 600 TABLET, EXTENDED RELEASE ORAL at 10:33

## 2022-11-02 RX ADMIN — INSULIN GLARGINE 22 UNITS: 100 INJECTION, SOLUTION SUBCUTANEOUS at 21:09

## 2022-11-02 RX ADMIN — HEPARIN SODIUM 9.1 UNITS/KG/HR: 10000 INJECTION, SOLUTION INTRAVENOUS at 01:43

## 2022-11-02 RX ADMIN — PRAVASTATIN SODIUM 80 MG: 80 TABLET ORAL at 16:47

## 2022-11-02 RX ADMIN — FLUTICASONE PROPIONATE 1 SPRAY: 50 SPRAY, METERED NASAL at 17:28

## 2022-11-02 RX ADMIN — GUAIFENESIN 600 MG: 600 TABLET, EXTENDED RELEASE ORAL at 21:25

## 2022-11-02 RX ADMIN — APIXABAN 5 MG: 5 TABLET, FILM COATED ORAL at 17:28

## 2022-11-02 RX ADMIN — MONTELUKAST 10 MG: 10 TABLET, FILM COATED ORAL at 21:09

## 2022-11-02 RX ADMIN — PREGABALIN 100 MG: 100 CAPSULE ORAL at 17:28

## 2022-11-02 RX ADMIN — DOCUSATE SODIUM 100 MG: 100 CAPSULE, LIQUID FILLED ORAL at 08:26

## 2022-11-02 RX ADMIN — INSULIN LISPRO 2 UNITS: 100 INJECTION, SOLUTION INTRAVENOUS; SUBCUTANEOUS at 12:10

## 2022-11-02 RX ADMIN — REMDESIVIR 100 MG: 100 INJECTION, POWDER, LYOPHILIZED, FOR SOLUTION INTRAVENOUS at 21:00

## 2022-11-02 RX ADMIN — PREGABALIN 100 MG: 100 CAPSULE ORAL at 08:26

## 2022-11-02 RX ADMIN — METOPROLOL SUCCINATE 50 MG: 50 TABLET, EXTENDED RELEASE ORAL at 08:26

## 2022-11-02 RX ADMIN — Medication 1 CAPSULE: at 08:26

## 2022-11-02 NOTE — ASSESSMENT & PLAN NOTE
History of proximal atrial fibrillation  EKG normal sinus rhythm  · Continue toprol XL 50 mg daily   · OK to stop heparin gtt and restart Eliquis

## 2022-11-02 NOTE — ASSESSMENT & PLAN NOTE
Wt Readings from Last 3 Encounters:   10/31/22 112 kg (246 lb)   10/31/22 112 kg (246 lb)   09/09/22 112 kg (246 lb)     · Continue Toprol XL 50 mg daily   · Hold torsemide  · Continue Jardiance

## 2022-11-02 NOTE — OCCUPATIONAL THERAPY NOTE
OT EVALUATION       11/02/22 1453   Note Type   Note type Evaluation   Pain Assessment   Pain Assessment Tool 0-10   Pain Score No Pain   Restrictions/Precautions   Other Precautions Airborne/isolation;Contact/isolation; Fall Risk   Home Living   Type of [address] One level  (3 JESS)   Bathroom Shower/Tub Tub/shower unit   [address] [place] [address] chair   Home Equipment Wheelchair-manual;Walker; Hemiwalker   Prior Function   Level of [place] Independent with functional mobility; Needs assistance with ADLs; Needs assistance with IADLS   Lives With Family  (niece)   Receives Help From Family   IADLs Family/Friend/Other provides transportation; Family/Friend/Other provides meals   Comments Patient admitted with COVID 19   ADL   Eating Assistance 7  Independent   Grooming Assistance 5  Supervision/Setup   Grooming Deficit Wash/dry hands  (standing at sink)   UB Bathing Assistance 7  Independent    [address]  5  Supervision/Setup   Toileting Deficit Perineal hygiene   Bed Mobility   Sit to Supine 5  Supervision   Transfers   Sit to Stand 5  Supervision   Stand to Sit 5  Supervision   Toilet transfer 5  Supervision   Functional Mobility   Functional Mobility 5  Supervision   Additional Comments functional household distance from bathroom   Balance   Static Sitting Fair +   Dynamic Sitting Fair +   Static Standing Fair +   Dynamic Standing Fair   Activity Tolerance   Activity Tolerance Patient limited by fatigue   Nurse Made Aware yes, [place]   RUE Assessment   RUE Assessment WFL   LUE Assessment   LUE Assessment WFL  (hx of shoulder replacement, 90 degrees flexion)   Cognition   Overall Cognitive Status WFL   Arousal/Participation Cooperative   Attention Within functional limits   Orientation Level Oriented X4   Following Commands Follows all commands and directions without difficulty   Assessment   Limitation Decreased ADL status; Decreased UE strength;Decreased Safe judgement during ADL;Decreased endurance;Decreased self-care trans;Decreased high-level ADLs  (decreased balance and mobility)   Prognosis Good   Assessment Patient evaluated by Occupational Therapy  Patient admitted with COVID-19  The patients occupational profile, medical and therapy history includes a extensive additional review of physical, cognitive, or psychosocial history related to current functional performance  Comorbidities affecting functional mobility and ADLS include: anemia, asthma and COVID-19  Prior to admission, patient was independent with functional mobility without assistive device, requiring assist for ADLS and requiring assist for IADLS  The evaluation identifies the following performance deficits: weakness, impaired balance, decreased endurance, increased fall risk, new onset of impairment of functional mobility, decreased ADLS, decreased IADLS, decreased activity tolerance, decreased safety awareness, impaired judgement and decreased strength, that result in activity limitations and/or participation restrictions  This evaluation requires clinical decision making of moderate complexity, because the patient may present with comorbidities that affect occupational performance and required minimal or moderate modification of tasks or assistance with the consideration of several treatment options  The Barthel Index was used as a functional outcome tool presenting with a score of Barthel Index Score: 55, indicating marked limitations of functional mobility and ADLS  The patient's raw score on the -PAC Daily Activity inpatient short form is 21, standardized score is 44 27, greater than 39 4  Patients at this level are likely to benefit from DC to home  Please refer to the recommendation of the Occupational Therapist for safe DC planning   Patient will benefit from skilled Occupational Therapy services to address above deficits and facilitate a safe return to prior level of function  Goals   Patient Goals to go home   STG Time Frame   (1-7 days)   Short Term Goal  Goals established to promote Patient Goals: to go home:  Grooming: independent standing at sink; Bathing: supervision; Lower Body Dressing: supervision; Toileting: supervision; Patient will increase standing tolerance to 5 minutes during ADL task to decrease assistance level and decrease fall risk; Patient will increase bed mobility to independent in preparation for ADLS and transfers; Patient will increase functional mobility to and from bathroom with no assistive device independently to increase performance with ADLS and to use a toilet; Patient will tolerate 10 minutes of UE ROM/strengthening to increase general activity tolerance and performance in ADLS/IADLS; Patient will improve functional activity tolerance to 10 minutes of sustained functional tasks to increase participation in basic self-care and decrease assistance level;  Patient will be able to to verbalize understanding and perform energy conservation/proper body mechanics during ADLS and functional mobility at least 75% of the time with minimal cueing to decrease signs of fatigue and increase stamina to return to prior level of function;  Patient will increase dynamic standing balance to fair+ to improve postural stability and decrease fall risk during standing ADLS and transfers  LTG Time Frame   (8-14 days)   Long Term Goal Bathing: independent;  Lower Body Dressing: independent; Patient will increase standing tolerance to 10 minutes during ADL task to decrease assistance level and decrease fall risk; Patient will tolerate 20 minutes of UE ROM/strengthening to increase general activity tolerance and performance in ADLS/IADLS; Patient will improve functional activity tolerance to 20 minutes of sustained functional tasks to increase participation in basic self-care and decrease assistance level;  Patient will be able to to verbalize understanding and perform energy conservation/proper body mechanics during ADLS and functional mobility at least 90% of the time to decrease signs of fatigue and increase stamina to return to prior level of function;  Patient will increase dynamic standing balance to good to improve postural stability and decrease fall risk during standing ADLS and transfers  Pt will score >/= 24/24 on AM-PAC Daily Activity Inpatient scale to promote safe independence with ADLs and functional mobility; Pt will score >/= 85/100 on Barthel Index in order to decrease caregiver assistance needed and increase ability to perform ADLs and functional mobility  Functional Transfer Goals   Pt Will Perform All Functional Transfers   (STG independent)   Plan   Treatment Interventions ADL retraining;Functional transfer training;UE strengthening/ROM; Endurance training;Patient/family training;Equipment evaluation/education; Activityengagement; Compensatory technique education   Goal Expiration Date 11/16/22   OT Frequency 3-5x/wk   Recommendation   OT Discharge Recommendation No rehabilitation needs   AM-PAC Daily Activity Inpatient   Lower Body Dressing 3   Bathing 3   Toileting 3   Upper Body Dressing 4   Grooming 4   Eating 4   Daily Activity Raw Score 21   Daily Activity Standardized Score (Calc for Raw Score >=11) 44 27   AM-PAC Applied Cognition Inpatient   Following a Speech/Presentation 4   Understanding Ordinary Conversation 4   Taking Medications 4   Remembering Where Things Are Placed or Put Away 4   Remembering List of 4-5 Errands 4   Taking Care of Complicated Tasks 4   Applied Cognition Raw Score 24   Applied Cognition Standardized Score 62 21   Barthel Index   Feeding 10   Bathing 0   Grooming Score 0   Dressing Score 5   Bladder Score 10   Bowels Score 10   Toilet Use Score 5   Transfers (Bed/Chair) Score 10   Mobility (Level Surface) Score 0   Stairs Score 5 Barthel Index Score [place] 9 License Number  Shad Santiago [place] 89 OTR/L [id number]

## 2022-11-02 NOTE — PLAN OF CARE
Problem: Potential for Falls  Goal: Patient will remain free of falls  Description: INTERVENTIONS:  - Educate patient/family on patient safety including physical limitations  - Instruct patient to call for assistance with activity   - Consult OT/PT to assist with strengthening/mobility   - Keep Call bell within reach  - Keep bed low and locked with side rails adjusted as appropriate  - Keep care items and personal belongings within reach  - Initiate and maintain comfort rounds  - Apply yellow socks and bracelet for high fall risk patients  - Consider moving patient to room near nurses station  Outcome: Progressing     Problem: MOBILITY - ADULT  Goal: Maintain or return to baseline ADL function  Description: INTERVENTIONS:  -  Assess patient's ability to carry out ADLs; assess patient's baseline for ADL function and identify physical deficits which impact ability to perform ADLs (bathing, care of mouth/teeth, toileting, grooming, dressing, etc )  - Assess/evaluate cause of self-care deficits   - Assess range of motion  - Assess patient's mobility; develop plan if impaired  - Assess patient's need for assistive devices and provide as appropriate  - Encourage maximum independence but intervene and supervise when necessary  - Involve family in performance of ADLs  - Assess for home care needs following discharge   - Consider OT consult to assist with ADL evaluation and planning for discharge  - Provide patient education as appropriate  Outcome: Progressing  Goal: Maintains/Returns to pre admission functional level  Description: INTERVENTIONS:  - Perform BMAT or MOVE assessment daily    - Set and communicate daily mobility goal to care team and patient/family/caregiver     - Collaborate with rehabilitation services on mobility goals if consulted  - Out of bed for toileting  - Record patient progress and toleration of activity level   Outcome: Progressing     Problem: PAIN - ADULT  Goal: Verbalizes/displays adequate comfort level or baseline comfort level  Description: Interventions:  - Encourage patient to monitor pain and request assistance  - Assess pain using appropriate pain scale  - Administer analgesics based on type and severity of pain and evaluate response  - Implement non-pharmacological measures as appropriate and evaluate response  - Consider cultural and social influences on pain and pain management  - Notify physician/advanced practitioner if interventions unsuccessful or patient reports new pain  Outcome: Progressing     Problem: INFECTION - ADULT  Goal: Absence or prevention of progression during hospitalization  Description: INTERVENTIONS:  - Assess and monitor for signs and symptoms of infection  - Monitor lab/diagnostic results  - Monitor all insertion sites, i e  indwelling lines, tubes, and drains  - Monitor endotracheal if appropriate and nasal secretions for changes in amount and color  - [place] appropriate cooling/warming therapies per order  - Administer medications as ordered  - Instruct and encourage patient and family to use good hand hygiene technique  - Identify and instruct in appropriate isolation precautions for identified infection/condition  Outcome: Progressing  Goal: Absence of fever/infection during neutropenic period  Description: INTERVENTIONS:  - Monitor WBC    Outcome: Progressing     Problem: SAFETY ADULT  Goal: Patient will remain free of falls  Description: INTERVENTIONS:  - Educate patient/family on patient safety including physical limitations  - Instruct patient to call for assistance with activity   - Consult OT/PT to assist with strengthening/mobility   - Keep Call bell within reach  - Keep bed low and locked with side rails adjusted as appropriate  - Keep care items and personal belongings within reach  - Initiate and maintain comfort rounds  - Make Fall Risk Sign visible to staff  - Apply yellow socks and bracelet for high fall risk patients  - Consider moving patient to room near nurses station  Outcome: Progressing  Goal: Maintain or return to baseline ADL function  Description: INTERVENTIONS:  -  Assess patient's ability to carry out ADLs; assess patient's baseline for ADL function and identify physical deficits which impact ability to perform ADLs (bathing, care of mouth/teeth, toileting, grooming, dressing, etc )  - Assess/evaluate cause of self-care deficits   - Assess range of motion  - Assess patient's mobility; develop plan if impaired  - Assess patient's need for assistive devices and provide as appropriate  - Encourage maximum independence but intervene and supervise when necessary  - Involve family in performance of ADLs  - Assess for home care needs following discharge   - Consider OT consult to assist with ADL evaluation and planning for discharge  - Provide patient education as appropriate  Outcome: Progressing  Goal: Maintains/Returns to pre admission functional level  Description: INTERVENTIONS:  - Perform BMAT or MOVE assessment daily    - Set and communicate daily mobility goal to care team and patient/family/caregiver     - Collaborate with rehabilitation services on mobility goals if consulted  - Out of bed for toileting  - Record patient progress and toleration of activity level   Outcome: Progressing     Problem: DISCHARGE PLANNING  Goal: Discharge to home or other facility with appropriate resources  Description: INTERVENTIONS:  - Identify barriers to discharge w/patient and caregiver  - Arrange for needed discharge resources and transportation as appropriate  - Identify discharge learning needs (meds, wound care, etc )  - Arrange for interpretive services to assist at discharge as needed  - Refer to Case Management Department for coordinating discharge planning if the patient needs post-hospital services based on physician/advanced practitioner order or complex needs related to functional status, cognitive ability, or social support system  Outcome: Progressing     Problem: Knowledge Deficit  Goal: Patient/family/caregiver demonstrates understanding of disease process, treatment plan, medications, and discharge instructions  Description: Complete learning assessment and assess knowledge base    Interventions:  - Provide teaching at level of understanding  - Provide teaching via preferred learning methods  Outcome: Progressing     Problem: Prexisting or High Potential for Compromised Skin Integrity  Goal: Skin integrity is maintained or improved  Description: INTERVENTIONS:  - Identify patients at risk for skin breakdown  - Assess and monitor skin integrity  - Assess and monitor nutrition and hydration status  - Monitor labs   - Assess for incontinence   - Turn and reposition patient  - Assist with mobility/ambulation  - Relieve pressure over bony prominences  - Avoid friction and shearing  - Provide appropriate hygiene as needed including keeping skin clean and dry  - Evaluate need for skin moisturizer/barrier cream  - Collaborate with interdisciplinary team   - Patient/family teaching  - Consider wound care consult   Outcome: Progressing

## 2022-11-02 NOTE — CASE MANAGEMENT
Case Management Assessment & Discharge Planning Note    Patient name Mac Rise  Location [address]  Stajadeon-* MRN [id number]  : 1950 Date 2022       Current Admission Date: 10/31/2022  Current Admission Diagnosis:COVID-19   Patient Active Problem List    Diagnosis Date Noted   • COVID-19 10/31/2022   • Acute kidney injury superimposed on CKD ([facility] ) 2022   • Rash 2022   • PSVT (paroxysmal supraventricular tachycardia) ([facility] ) 2022   • Cellulitis of left upper extremity 2022   • Chronic respiratory failure with hypoxia and hypercapnia ([facility] ) 2022   • Platelets decreased ([facility] ) 2022   • Thrombophlebitis of arm, right 2022   • Elevated serum creatinine 2022   • KATRINA (obstructive sleep apnea) 2022   • Obesity hypoventilation syndrome ([facility] ) 2022   • Morbid obesity ([facility] ) 2022   • Lump of left breast 2022   • Pressure injury of deep tissue of sacral region 2022   • Pulmonary nodules 2022   • Chronic diastolic heart failure ([facility] ) 2022   • Peripheral venous insufficiency 2022   • Post-herpetic polyneuropathy 2022   • Raynaud's disease 2022   • Lung nodule < 6cm on CT 2022   • CKD (chronic kidney disease)    • Status post reverse total replacement of left shoulder 2022   • Closed fracture of proximal end of left humerus with routine healing 2022   • PONV (postoperative nausea and vomiting) 2022   • Diabetes mellitus, type 2 ([address] ) 2022   • Gastroesophageal reflux disease 2022   • Nephrolithiasis 2022   • Arthritis 2022   • S/P total knee replacement, right 2022   • On continuous oral anticoagulation - eliquis 2022   • Humeral head fracture 2022   • Essential hypertension 2019   • Anemia 2019   • Mixed hyperlipidemia 2019   • Paroxysmal atrial fibrillation ([facility] ) 2019   • Lower leg edema 2019   • Asthma 03/08/2018   • Morbid obesity with BMI of 45 0-49 9, adult ([address] ) 03/08/2018      LOS (days): 2  Geometric Mean LOS (GMLOS) (days): 3 60  Days to GMLOS:1 8     OBJECTIVE:    Risk of Unplanned Readmission Score: 43 71         Current admission status: Inpatient       Preferred Pharmacy:   [id number], [facility] ([address])  [address] [address] [place] [address] [address] ([address])  [place] [address]  Phone: [phone] Fax: [phone]    Primary Care Provider: Shabnam Deluna MD    Primary Insurance: MEDICARE  Secondary Insurance: [employer]    ASSESSMENT:  Active Health Care Proxies     [facility]  AND [facility] Representative - Sister   Primary Phone: [phone] (Mobile)               Advance Directives  Does patient have a [address]?: No  Was patient offered paperwork?: Yes (Provided)  Does patient currently have a Health Care decision maker?: Yes, please see Health Care Proxy section  Does patient have Advance Directives?: No (provided)  Primary Contact: Dalia Brown - [phone]         Readmission Root Cause  30 Day Readmission: No    Patient Information  Admitted from[de-identified] Home  Mental Status: Alert  During Assessment patient was accompanied by: Not accompanied during assessment  Assessment information provided by[de-identified] Patient, Other - please comment (Karen Irvin)  Primary Caregiver: [facility] of Residence: [address] [place] [address] do you live in?: [address] entry access options   Select all that apply : Stairs  Number of steps to enter home : 3  Type of Current Residence: [employer]  In the last 12 months, was there a time when you were not able to pay the mortgage or rent on time?: No  In the last 12 months, how many places have you lived?: 2  In the last 12 months, was there a time when you did not have a steady place to sleep or slept in a shelter (including now)?: No  Homeless/housing insecurity resource given?: N/A  Living Arrangements: Lives w/ Spouse/significant other  Is patient a ?: No    Activities of Daily Living Prior to Admission  Functional Status: Assistance  Completes ADLs independently?: No  Level of ADL dependence: Assistance  Ambulates independently?: Yes  Does patient use assisted devices?: Yes  Assisted Devices (DME) used: Home Oxygen concentrator, Portable Oxygen tanks, Walker, [address], Wheelchair, [address]  DME Company Name (respiratory supplies):  [employer]  O2 Rate(s): 2 - Nocturnal  Does patient currently own DME?: Yes  What DME does the patient currently own?: Home Oxygen concentrator, Portable Oxygen tanks, Shower Chair, [address], Walker, Wheelchair  Does patient have a history of Outpatient Therapy (PT/OT)?: No  Does the patient have a history of Short-Term Rehab?: Yes (Zhang S  Jacqueline)  Does patient have a history of HHC?: Yes  Does patient currently have [facility]?: No         Patient Information Continued  Income Source: Pension/intermediate  Does patient have prescription coverage?: Yes (Hilario Blackwell)  Within the past 12 months, you worried that your food would run out before you got the money to buy more : Never true  Within the past 12 months, the food you bought just didn't last and you didn't have money to get more : Never true  Food insecurity resource given?: N/A  Does patient receive dialysis treatments?: No  Does patient have a history of substance abuse?: No  Does patient have a history of Mental Health Diagnosis?: No         Means of Transportation  Means of Transport to Appts[de-identified] Family transport  In the past 12 months, has lack of transportation kept you from medical appointments or from getting medications?: No  In the past 12 months, has lack of transportation kept you from meetings, work, or from getting things needed for daily living?: No  Was application for public transport provided?: N/A        DISCHARGE DETAILS:    Discharge planning discussed with[de-identified] Patient and carter Canas of Choice: Yes  Comments - Freedom of Choice: Patient states she was with [facility] [address] and had a positive experiance  She would like a referral to them again  CM contacted family/caregiver?: Yes  Were Treatment Team discharge recommendations reviewed with patient/caregiver?: Yes  Did patient/caregiver verbalize understanding of patient care needs?: Yes  Were patient/caregiver advised of the risks associated with not following Treatment Team discharge recommendations?: Yes    Contacts  Patient Contacts: Sonja Garcia  Relationship to Patient[de-identified] Family  Contact Method: In Person  Reason/Outcome: Continuity of Care, Referral, Discharge [address]         Is the patient interested in [address] at discharge?: Yes  [address] requested[de-identified] Nursing, Physical [address] Name[de-identified] [facility] [address] Provider[de-identified] PCP  Home Health Services Needed[de-identified] Evaluate Functional Status and Safety, Gait/ADL Training, Oxygen Via Nasal Cannula, Strengthening/Theraputic Exercises to Improve Function  Oxygen LPM Ordered (if applicable based on home health services needed):: 2 LPM (Nocturnal)  Homebound Criteria Met[de-identified] Uses an Assist Device (i e  cane, walker, etc), Requires the Assistance of Another Person for Safe Ambulation or to Leave the Home  Supporting Clincal Findings[de-identified] Fatigues Easliy in [employer], Limited Endurance    DME Referral Provided  Referral made for DME?: No    Other Referral/Resources/Interventions Provided:  Interventions: [facility]  Referral Comments: CM s/w patient via telephone due to Covid + to introduce self/role, complete initial assessment, and discuss dcp  Patient has been staying with her niece Sonja Garcia since her should injury and states the plan is for her to stay with Sonja Garcia permanently at this point  CM reviewed care team recommendation for [address] and patient would like these services established with [facility] as she has had in the past  Referral placed via [facility]  CM spoke with patient's niece Howard Chavarria to update her on DCP and confirm physicial address           Treatment Team Recommendation: Home with [facility]  Discharge Destination Plan[de-identified] Home with [place] at Discharge : Family                             IMM Given (Date):: 11/02/22  IMM Given to[de-identified] Patient

## 2022-11-02 NOTE — PROGRESS NOTES
Saul [address]  66   Progress Note Anupam Damon 1950, 81 y o  female MRN: [id number]  Unit/Bed#: [address] [place] [address] Encounter: [id number]  Primary Care Provider: Zay Rader MD   Date and time admitted to hospital: 10/31/2022  3:27 PM    * COVID-19  Assessment & Plan  Patient presented with generalized weakness, body aches, fever cough with congestion  Tested for COVID positive on October 29, 2022  Patient reported hypoxia at home but O2 saturation has been in high 90s on room air in ED  Troponin, 7, ProBNP 718, D-dimer 0 91, CK 41, procalcitonin 0 11, CRP 73 4  · Following mild pathway   · As patient is high risk candidate given her comorbidities including age more than 72, morbid obesity, diabetes, CKD patient was started on remdesivir, day #3  · Stop Heparin gtt and resume home Eliquis   · Add Flonase and Mucinex   · Encourage incentive spirometry and ambulate as tolerated  · Patient on room air    Acute kidney injury superimposed on CKD [facility])  Assessment & Plan  Lab Results   Component Value Date    EGFR 39 11/02/2022    EGFR 29 11/01/2022    EGFR 21 10/31/2022    CREATININE 1 35 (H) 11/02/2022    CREATININE 1 74 (H) 11/01/2022    CREATININE 2 29 (H) 10/31/2022   Baseline creatinine anywhere from 1 7-1 9  Creatinine mildly elevated at 2 29 upon admission  Echo from March 22 showed EF of 53% with borderline concentric hypertrophy   · Holding Torsemide   · Given IV hydration with normal saline at 50 mL/hour x1 liter  · Avoid nephrotoxic agents and hypotension  · Follow-up BMP in a m      Chronic diastolic heart failure (HCC)  Assessment & Plan  Wt Readings from Last 3 Encounters:   10/31/22 112 kg (246 lb)   10/31/22 112 kg (246 lb)   09/09/22 112 kg (246 lb)     · Continue Toprol XL 50 mg daily   · Hold torsemide  · Continue Jardiance    Diabetes mellitus, type 2 [facility])  Assessment & Plan  Lab Results   Component Value Date    HGBA1C 9 0 (H) 07/20/2022       Recent Labs 11/01/22  1613 11/01/22  2102 11/02/22  0722 11/02/22  1133   POCGLU 249* 221* 105 188*       Blood Sugar Average: Last 72 hrs:  (P) 221 [id number]   · Patient is on Toujeo insulin which will be substituted with Lantus   · Reduce Lantus to 22 units at HS   · Monitor ACCU checks AC + HS with lispro insulin sliding scale coverage   · Continue Jardiance    Paroxysmal atrial fibrillation (HCC)  Assessment & Plan  History of proximal atrial fibrillation  EKG normal sinus rhythm  · Continue toprol XL 50 mg daily   · OK to stop heparin gtt and restart Eliquis     KATRINA (obstructive sleep apnea)  Assessment & Plan  · Noncompliant with BiPAP  · Patient uses oxygen as needed    Obesity hypoventilation syndrome (HCC)  Assessment & Plan  Patient uses oxygen at night at home   · Continue oxygen supplementation at bedtime  · Not requiring daytime oxygen     Mixed hyperlipidemia  Assessment & Plan  · Crestor substituted with Pravachol        VTE Pharmacologic Prophylaxis: VTE Score: 5 Moderate Risk (Score 3-4) - Pharmacological DVT Prophylaxis Ordered: apixaban (Eliquis)  Patient Centered Rounds: I performed bedside rounds with nursing staff today  Discussions with Specialists or Other Care Team Provider: nursing, CM    Education and Discussions with Family / Patient: Updated  (niece) via phone  Time Spent for Care: 30 minutes  More than 50% of total time spent on counseling and coordination of care as described above  Current Length of Stay: 2 day(s)  Current Patient Status: Inpatient   Certification Statement: The patient will continue to require additional inpatient hospital stay due to covid 19  Discharge Plan: Anticipate discharge tomorrow to home  Code Status: Level 1 - Full Code    Subjective:   Patient seen and examined at bedside  She is ambulating to and from the bathroom  Had a BM this morning  Reports sinus congestion and post nasal drip  Denies HA, dizziness, CP, and SOB      Objective: Vitals:   Temp (24hrs), Av 3 °F (36 8 °C), Min:97 9 °F (36 6 °C), Max:98 8 °F (37 1 °C)    Temp:  [97 9 °F (36 6 °C)-98 8 °F (37 1 °C)] 98 8 °F (37 1 °C)  HR:  [65-68] 65  Resp:  [16-18] 16  BP: (145-150)/(64-65) 146/65  SpO2:  [95 %-98 %] 97 %  Body mass index is 44 99 kg/m²  Input and Output Summary (last 24 hours): Intake/Output Summary (Last 24 hours) at 2022 1613  Last data filed at 2022 0101  Gross per 24 hour   Intake 0 ml   Output --   Net 0 ml       Physical Exam:   Physical Exam  Vitals and nursing note reviewed  Constitutional:       General: She is not in acute distress  Appearance: She is obese  She is ill-appearing  She is not toxic-appearing or diaphoretic  Comments: Pleasant female, on room air, talkative but stops to take a breath   HENT:      Head: Normocephalic  Mouth/Throat:      Mouth: Mucous membranes are moist    Eyes:      Conjunctiva/sclera: Conjunctivae normal    Cardiovascular:      Rate and Rhythm: Normal rate  Pulmonary:      Effort: Pulmonary effort is normal       Breath sounds: Examination of the right-lower field reveals decreased breath sounds  Examination of the left-lower field reveals decreased breath sounds  Decreased breath sounds present  No wheezing, rhonchi or rales  Comments: Moist, non productive cough   Abdominal:      General: Bowel sounds are normal  There is no distension  Palpations: Abdomen is soft  Musculoskeletal:         General: Normal range of motion  Cervical back: Normal range of motion  Right lower leg: No edema  Left lower leg: No edema  Skin:     General: Skin is warm and dry  Capillary Refill: Capillary refill takes less than 2 seconds  Neurological:      Mental Status: She is alert and oriented to person, place, and time  Mental status is at baseline  Psychiatric:         Mood and Affect: Mood normal          Behavior: Behavior normal          Thought Content:  Thought content normal          Judgment: Judgment normal           Additional Data:     Labs:  Results from last 7 days   Lab Units 11/02/22  0600 11/01/22  0450   WBC Thousand/uL 5 66 5 71   HEMOGLOBIN g/dL 10 6* 9 9*   HEMATOCRIT % 33 9* 32 1*   PLATELETS Thousands/uL 132* 153   NEUTROS PCT %  --  49   LYMPHS PCT %  --  35   MONOS PCT %  --  14*   EOS PCT %  --  1     Results from last 7 days   Lab Units 11/02/22  0600 11/01/22  0450   SODIUM mmol/L 144 142   POTASSIUM mmol/L 4 4 3 7   CHLORIDE mmol/L 109* 105   CO2 mmol/L 30 30   BUN mg/dL 48* 51*   CREATININE mg/dL 1 35* 1 74*   ANION GAP mmol/L 5 7   CALCIUM mg/dL 7 9* 7 4*   ALBUMIN g/dL  --  2 4*   TOTAL BILIRUBIN mg/dL  --  0 12*   ALK PHOS U/L  --  56   ALT U/L  --  15   AST U/L  --  15   GLUCOSE RANDOM mg/dL 106 230*         Results from last 7 days   Lab Units 11/02/22  1133 11/02/22  0722 11/01/22  2102 11/01/22  1613 11/01/22  1136 11/01/22  0727 10/31/22  2141   POC GLUCOSE mg/dl 188* 105 221* 249* 269* 215* 302*         Results from last 7 days   Lab Units 11/01/22  0450 10/31/22  2051   PROCALCITONIN ng/ml 0 11 0 11       Lines/Drains:  Invasive Devices  Report    Peripheral Intravenous Line  Duration           Peripheral IV Left;Proximal;Ventral (anterior) Forearm -- days    Peripheral IV 10/31/22 Left Forearm 1 day                      Imaging: Reviewed radiology reports from this admission including: chest xray    Recent Cultures (last 7 days):         Last 24 Hours Medication List:   Current Facility-Administered Medications   Medication Dose Route Frequency Provider Last Rate   • acetaminophen  650 mg Oral Q6H PRN Grant Ny MD     • albuterol  2 puff Inhalation Q6H PRN Georgi Dye MD     • ammonium lactate   Topical Daily Grant Ny MD     • apixaban  5 mg Oral BID RK Espitia     • b complex-vitamin C-folic acid  1 capsule Oral Daily Grant Ny MD     • docusate sodium  100 mg Oral Daily Georgi MD Hardik     • Empagliflozin  10 mg Oral QAM Lenin Baron MD     • fluticasone  1 spray Each Nare BID RK Capellan     • guaiFENesin  600 mg Oral Q12H [address] RK Capellan     • insulin glargine  22 Units Subcutaneous HS RK Capellan     • insulin lispro  1-5 Units Subcutaneous HS Lenin Baron MD     • insulin lispro  2-12 Units Subcutaneous TID AC Georgi Dye MD     • metoprolol succinate  50 mg Oral Daily Lenin Baron MD     • montelukast  10 mg Oral HS Lenin Baron MD     • ondansetron  4 mg Intravenous Q6H PRN Lenin Baron MD     • pravastatin  80 mg Oral Daily With Sandy Martinez MD     • pregabalin  100 mg Oral BID Lenin Baron MD     • remdesivir  100 mg Intravenous Q24H Lenin Baron  mg (11/01/22 2144)        Today, Patient Was Seen By: Sendy Rodgers    **Please Note: This note may have been constructed using a voice recognition system  **

## 2022-11-02 NOTE — ASSESSMENT & PLAN NOTE
Lab Results   Component Value Date    HGBA1C 9 0 (H) 07/20/2022       Recent Labs     11/01/22  1613 11/01/22  2102 11/02/22  0722 11/02/22  1133   POCGLU 249* 221* 105 188*       Blood Sugar Average: Last 72 hrs:  (P) 221 [id number]   · Patient is on Toujeo insulin which will be substituted with Lantus   · Reduce Lantus to 22 units at HS   · Monitor ACCU checks AC + HS with lispro insulin sliding scale coverage   · Continue Jardiance

## 2022-11-02 NOTE — ASSESSMENT & PLAN NOTE
Patient presented with generalized weakness, body aches, fever cough with congestion  Tested for COVID positive on October 29, 2022  Patient reported hypoxia at home but O2 saturation has been in high 90s on room air in ED  Troponin, 7, ProBNP 718, D-dimer 0 91, CK 41, procalcitonin 0 11, CRP 73 4  · Following mild pathway   · As patient is high risk candidate given her comorbidities including age more than 74, morbid obesity, diabetes, CKD patient was started on remdesivir, day #3  · Stop Heparin gtt and resume home Eliquis   · Add Flonase and Mucinex   · Encourage incentive spirometry and ambulate as tolerated  · Patient on room air

## 2022-11-02 NOTE — ASSESSMENT & PLAN NOTE
Lab Results   Component Value Date    EGFR 39 11/02/2022    EGFR 29 11/01/2022    EGFR 21 10/31/2022    CREATININE 1 35 (H) 11/02/2022    CREATININE 1 74 (H) 11/01/2022    CREATININE 2 29 (H) 10/31/2022   Baseline creatinine anywhere from 1 7-1 9  Creatinine mildly elevated at 2 29 upon admission  Echo from March 22 showed EF of 53% with borderline concentric hypertrophy   · Holding Torsemide   · Given IV hydration with normal saline at 50 mL/hour x1 liter  · Avoid nephrotoxic agents and hypotension  · Follow-up BMP in a m

## 2022-11-02 NOTE — ASSESSMENT & PLAN NOTE
Patient uses oxygen at night at home   · Continue oxygen supplementation at bedtime  · Not requiring daytime oxygen

## 2022-11-03 VITALS
WEIGHT: 246 LBS | HEART RATE: 63 BPM | HEIGHT: 62 IN | BODY MASS INDEX: 45.27 KG/M2 | OXYGEN SATURATION: 95 % | DIASTOLIC BLOOD PRESSURE: 67 MMHG | TEMPERATURE: 98 F | RESPIRATION RATE: 18 BRPM | SYSTOLIC BLOOD PRESSURE: 143 MMHG

## 2022-11-03 LAB
ANION GAP SERPL CALCULATED.3IONS-SCNC: 6 MMOL/L (ref 4–13)
BACTERIA SPT RESP CULT: ABNORMAL
BUN SERPL-MCNC: 33 MG/DL (ref 5–25)
CALCIUM SERPL-MCNC: 7.8 MG/DL (ref 8.3–10.1)
CHLORIDE SERPL-SCNC: 111 MMOL/L (ref 96–108)
CO2 SERPL-SCNC: 28 MMOL/L (ref 21–32)
CREAT SERPL-MCNC: 1.21 MG/DL (ref 0.6–1.3)
CRP SERPL QL: 31 MG/L
D DIMER PPP FEU-MCNC: 0.72 UG/ML FEU
GFR SERPL CREATININE-BSD FRML MDRD: 45 ML/MIN/1.73SQ M
GLUCOSE SERPL-MCNC: 103 MG/DL (ref 65–140)
GLUCOSE SERPL-MCNC: 114 MG/DL (ref 65–140)
GLUCOSE SERPL-MCNC: 167 MG/DL (ref 65–140)
GRAM STN SPEC: ABNORMAL
MAGNESIUM SERPL-MCNC: 2.1 MG/DL (ref 1.6–2.6)
PHOSPHATE SERPL-MCNC: 3.8 MG/DL (ref 2.3–4.1)
POTASSIUM SERPL-SCNC: 3.9 MMOL/L (ref 3.5–5.3)
SODIUM SERPL-SCNC: 145 MMOL/L (ref 135–147)

## 2022-11-03 RX ORDER — FLUTICASONE PROPIONATE 50 MCG
1 SPRAY, SUSPENSION (ML) NASAL DAILY
Refills: 0
Start: 2022-11-03

## 2022-11-03 RX ORDER — GUAIFENESIN 600 MG/1
600 TABLET, EXTENDED RELEASE ORAL EVERY 12 HOURS SCHEDULED
Refills: 0
Start: 2022-11-03 | End: 2022-11-09

## 2022-11-03 RX ADMIN — PREGABALIN 100 MG: 100 CAPSULE ORAL at 08:31

## 2022-11-03 RX ADMIN — DOCUSATE SODIUM 100 MG: 100 CAPSULE, LIQUID FILLED ORAL at 08:31

## 2022-11-03 RX ADMIN — Medication 1 CAPSULE: at 08:31

## 2022-11-03 RX ADMIN — APIXABAN 5 MG: 5 TABLET, FILM COATED ORAL at 08:32

## 2022-11-03 RX ADMIN — GUAIFENESIN 600 MG: 600 TABLET, EXTENDED RELEASE ORAL at 08:32

## 2022-11-03 RX ADMIN — METOPROLOL SUCCINATE 50 MG: 50 TABLET, EXTENDED RELEASE ORAL at 08:31

## 2022-11-03 RX ADMIN — FLUTICASONE PROPIONATE 1 SPRAY: 50 SPRAY, METERED NASAL at 08:32

## 2022-11-03 RX ADMIN — INSULIN LISPRO 2 UNITS: 100 INJECTION, SOLUTION INTRAVENOUS; SUBCUTANEOUS at 11:56

## 2022-11-03 NOTE — ASSESSMENT & PLAN NOTE
Patient presented with generalized weakness, body aches, fever cough with congestion  Tested for COVID positive on October 29, 2022  Patient reported hypoxia at home but O2 saturation has been in high 90s on room air in ED  Troponin, 7, ProBNP 718, D-dimer 0 91, CK 41, procalcitonin 0 11, CRP 73 4  · Following mild pathway   · Received a 3-day course of Remdesivir as patient is high risk candidate given her comorbidities including age more than 74, morbid obesity, diabetes, CKD   · Stopped Heparin gtt and resumed home Eliquis   · Added Flonase and Mucinex; continue   · Repeat sputum culture as first one was a poor specimen   · Procal negative so hold off on ABX at this time   · Encourage incentive spirometry and ambulate as tolerated  · Patient on room air and desires discharge home

## 2022-11-03 NOTE — PLAN OF CARE
Problem: Potential for Falls  Goal: Patient will remain free of falls  Description: INTERVENTIONS:  - Educate patient/family on patient safety including physical limitations  - Instruct patient to call for assistance with activity   - Consult OT/PT to assist with strengthening/mobility   - Keep Call bell within reach  - Keep bed low and locked with side rails adjusted as appropriate  - Keep care items and personal belongings within reach  - Initiate and maintain comfort rounds  - Make Fall Risk Sign visible to staff  - Offer Toileting every 2 Hours, in advance of need  - Initiate/Maintain alarm  - Obtain necessary fall risk management equipment  - Apply yellow socks and bracelet for high fall risk patients  - Consider moving patient to room near nurses station  Outcome: Progressing     Problem: MOBILITY - ADULT  Goal: Maintain or return to baseline ADL function  Description: INTERVENTIONS:  - Educate patient/family on patient safety including physical limitations  - Instruct patient to call for assistance with activity   - Consult OT/PT to assist with strengthening/mobility   - Keep Call bell within reach  - Keep bed low and locked with side rails adjusted as appropriate  - Keep care items and personal belongings within reach  - Initiate and maintain comfort rounds  - Make Fall Risk Sign visible to staff  - Offer Toileting every 2 Hours, in advance of need  - Initiate/Maintain alarm  - Obtain necessary fall risk management equipment  - Apply yellow socks and bracelet for high fall risk patients  - Consider moving patient to room near nurses station  Outcome: Progressing  Goal: Maintains/Returns to pre admission functional level  Description: INTERVENTIONS:  - Perform BMAT or MOVE assessment daily    - Set and communicate daily mobility goal to care team and patient/family/caregiver  - Collaborate with rehabilitation services on mobility goals if consulted  - Perform Range of Motion 3 times a day    - Reposition patient every 2 hours    - Dangle patient 3 times a day  - Stand patient 3 times a day  - Ambulate patient 3 times a day  - Out of bed to chair 3 times a day   - Out of bed for meals 3 times a day  - Out of bed for toileting  - Record patient progress and toleration of activity level   Outcome: Progressing     Problem: PAIN - ADULT  Goal: Verbalizes/displays adequate comfort level or baseline comfort level  Description: Interventions:  - Encourage patient to monitor pain and request assistance  - Assess pain using appropriate pain scale  - Administer analgesics based on type and severity of pain and evaluate response  - Implement non-pharmacological measures as appropriate and evaluate response  - Consider cultural and social influences on pain and pain management  - Notify physician/advanced practitioner if interventions unsuccessful or patient reports new pain  Outcome: Progressing     Problem: INFECTION - ADULT  Goal: Absence or prevention of progression during hospitalization  Description: INTERVENTIONS:  - Assess and monitor for signs and symptoms of infection  - Monitor lab/diagnostic results  - Monitor all insertion sites, i e  indwelling lines, tubes, and drains  - Monitor endotracheal if appropriate and nasal secretions for changes in amount and color  - [place] appropriate cooling/warming therapies per order  - Administer medications as ordered  - Instruct and encourage patient and family to use good hand hygiene technique  - Identify and instruct in appropriate isolation precautions for identified infection/condition  Outcome: Progressing  Goal: Absence of fever/infection during neutropenic period  Description: INTERVENTIONS:  - Monitor WBC    Outcome: Progressing     Problem: SAFETY ADULT  Goal: Patient will remain free of falls  Description: INTERVENTIONS:  - Educate patient/family on patient safety including physical limitations  - Instruct patient to call for assistance with activity   - Consult OT/PT to assist with strengthening/mobility   - Keep Call bell within reach  - Keep bed low and locked with side rails adjusted as appropriate  - Keep care items and personal belongings within reach  - Initiate and maintain comfort rounds  - Make Fall Risk Sign visible to staff  - Offer Toileting every 2 Hours, in advance of need  - Initiate/Maintain alarm  - Obtain necessary fall risk management equipment  - Apply yellow socks and bracelet for high fall risk patients  - Consider moving patient to room near nurses station  Outcome: Progressing  Goal: Maintain or return to baseline ADL function  Description: INTERVENTIONS:  - Educate patient/family on patient safety including physical limitations  - Instruct patient to call for assistance with activity   - Consult OT/PT to assist with strengthening/mobility   - Keep Call bell within reach  - Keep bed low and locked with side rails adjusted as appropriate  - Keep care items and personal belongings within reach  - Initiate and maintain comfort rounds  - Make Fall Risk Sign visible to staff  - Offer Toileting every 2 Hours, in advance of need  - Initiate/Maintain alarm  - Obtain necessary fall risk management equipment  - Apply yellow socks and bracelet for high fall risk patients  - Consider moving patient to room near nurses station  Outcome: Progressing  Goal: Maintains/Returns to pre admission functional level  Description: INTERVENTIONS:  - Perform BMAT or MOVE assessment daily    - Set and communicate daily mobility goal to care team and patient/family/caregiver  - Collaborate with rehabilitation services on mobility goals if consulted  - Perform Range of Motion 3 times a day  - Reposition patient every 2 hours    - Dangle patient 3 times a day  - Stand patient 3 times a day  - Ambulate patient 3 times a day  - Out of bed to chair 3 times a day   - Out of bed for meals 3 times a day  - Out of bed for toileting  - Record patient progress and toleration of activity level   Outcome: Progressing     Problem: DISCHARGE PLANNING  Goal: Discharge to home or other facility with appropriate resources  Description: INTERVENTIONS:  - Identify barriers to discharge w/patient and caregiver  - Arrange for needed discharge resources and transportation as appropriate  - Identify discharge learning needs (meds, wound care, etc )  - Arrange for interpretive services to assist at discharge as needed  - Refer to Case Management Department for coordinating discharge planning if the patient needs post-hospital services based on physician/advanced practitioner order or complex needs related to functional status, cognitive ability, or social support system  Outcome: Progressing     Problem: Knowledge Deficit  Goal: Patient/family/caregiver demonstrates understanding of disease process, treatment plan, medications, and discharge instructions  Description: Complete learning assessment and assess knowledge base    Interventions:  - Provide teaching at level of understanding  - Provide teaching via preferred learning methods  Outcome: Progressing     Problem: Prexisting or High Potential for Compromised Skin Integrity  Goal: Skin integrity is maintained or improved  Description: INTERVENTIONS:  - Identify patients at risk for skin breakdown  - Assess and monitor skin integrity  - Assess and monitor nutrition and hydration status  - Monitor labs   - Assess for incontinence   - Turn and reposition patient  - Assist with mobility/ambulation  - Relieve pressure over bony prominences  - Avoid friction and shearing  - Provide appropriate hygiene as needed including keeping skin clean and dry  - Evaluate need for skin moisturizer/barrier cream  - Collaborate with interdisciplinary team   - Patient/family teaching  - Consider wound care consult   Outcome: Progressing

## 2022-11-03 NOTE — ASSESSMENT & PLAN NOTE
History of proximal atrial fibrillation  EKG normal sinus rhythm  · Continue toprol XL 50 mg daily and Eliquis

## 2022-11-03 NOTE — ASSESSMENT & PLAN NOTE
Lab Results   Component Value Date    HGBA1C 9 0 (H) 07/20/2022       Recent Labs     11/02/22  1626 11/02/22 2050 11/03/22  0747 11/03/22  1153   POCGLU 208* 274* 114 167*       Blood Sugar Average: Last 72 hrs:  (P) 076 [id number]   · Resume home regimen with Toujeo and Jardiance

## 2022-11-03 NOTE — PLAN OF CARE
Problem: Potential for Falls  Goal: Patient will remain free of falls  Description: INTERVENTIONS:  - Educate patient/family on patient safety including physical limitations  - Instruct patient to call for assistance with activity   - Consult OT/PT to assist with strengthening/mobility   - Keep Call bell within reach  - Keep bed low and locked with side rails adjusted as appropriate  - Keep care items and personal belongings within reach  - Initiate and maintain comfort rounds  - Make Fall Risk Sign visible to staff  - Offer Toileting every 2 Hours, in advance of need  - Initiate/Maintain bed alarm  - Obtain necessary fall risk management equipment:  walker  Problem: PAIN - ADULT  Goal: Verbalizes/displays adequate comfort level or baseline comfort level  Description: Interventions:  - Encourage patient to monitor pain and request assistance  - Assess pain using appropriate pain scale  - Administer analgesics based on type and severity of pain and evaluate response  - Implement non-pharmacological measures as appropriate and evaluate response  - Consider cultural and social influences on pain and pain management  - Notify physician/advanced practitioner if interventions unsuccessful or patient reports new pain  Outcome: Progressing     Problem: INFECTION - ADULT  Goal: Absence or prevention of progression during hospitalization  Description: INTERVENTIONS:  - Assess and monitor for signs and symptoms of infection  - Monitor lab/diagnostic results  - Monitor all insertion sites, i e  indwelling lines, tubes, and drains  - Monitor endotracheal if appropriate and nasal secretions for changes in amount and color  - [place] appropriate cooling/warming therapies per order  - Administer medications as ordered  - Instruct and encourage patient and family to use good hand hygiene technique  - Identify and instruct in appropriate isolation precautions for identified infection/condition  Outcome: Progressing     Problem: Prexisting or High Potential for Compromised Skin Integrity  Goal: Skin integrity is maintained or improved  Description: INTERVENTIONS:  - Identify patients at risk for skin breakdown  - Assess and monitor skin integrity  - Assess and monitor nutrition and hydration status  - Monitor labs   - Assess for incontinence   - Turn and reposition patient  - Assist with mobility/ambulation  - Relieve pressure over bony prominences  - Avoid friction and shearing  - Provide appropriate hygiene as needed including keeping skin clean and dry  - Evaluate need for skin moisturizer/barrier cream  - Collaborate with interdisciplinary team   - Patient/family teaching  - Consider wound care consult   Outcome: Progressing     - Apply yellow socks and bracelet for high fall risk patients  - Consider moving patient to room near nurses station  Outcome: Progressing

## 2022-11-03 NOTE — ASSESSMENT & PLAN NOTE
Lab Results   Component Value Date    EGFR 45 11/03/2022    EGFR 39 11/02/2022    EGFR 29 11/01/2022    CREATININE 1 21 11/03/2022    CREATININE 1 35 (H) 11/02/2022    CREATININE 1 74 (H) 11/01/2022   Baseline creatinine anywhere from 1 7-1 9  Creatinine mildly elevated at 2 29 upon admission  Echo from March 22 showed EF of 53% with borderline concentric hypertrophy   · Held Torsemide and given IVF  · Cr now normal  · OK to resume torsemide on discharge

## 2022-11-03 NOTE — DISCHARGE SUMMARY
[place] 47  Discharge- Marycruz Kuhn 1950, 81 y o  female MRN: [id number]  Unit/Bed#: [address] [place] [address] Encounter: [id number]  Primary Care Provider:  Quique Giordano MD   Date and time admitted to hospital: 10/31/2022  3:27 PM    * COVID-19  Assessment & Plan  Patient presented with generalized weakness, body aches, fever cough with congestion  Tested for COVID positive on October 29, 2022  Patient reported hypoxia at home but O2 saturation has been in high 90s on room air in ED  Troponin, 7, ProBNP 718, D-dimer 0 91, CK 41, procalcitonin 0 11, CRP 73 4  · Following mild pathway   · Received a 3-day course of Remdesivir as patient is high risk candidate given her comorbidities including age more than 72, morbid obesity, diabetes, CKD   · Stopped Heparin gtt and resumed home Eliquis   · Added Flonase and Mucinex; continue   · Repeat sputum culture as first one was a poor specimen   · Procal negative so hold off on ABX at this time   · Encourage incentive spirometry and ambulate as tolerated  · Patient on room air and desires discharge home     Acute kidney injury superimposed on CKD [facility])  Assessment & Plan  Lab Results   Component Value Date    EGFR 45 11/03/2022    EGFR 39 11/02/2022    EGFR 29 11/01/2022    CREATININE 1 21 11/03/2022    CREATININE 1 35 (H) 11/02/2022    CREATININE 1 74 (H) 11/01/2022   Baseline creatinine anywhere from 1 7-1 9  Creatinine mildly elevated at 2 29 upon admission  Echo from March 22 showed EF of 53% with borderline concentric hypertrophy   · Held Torsemide and given IVF  · Cr now normal  · OK to resume torsemide on discharge      Chronic diastolic heart failure (HCC)  Assessment & Plan  Wt Readings from Last 3 Encounters:   10/31/22 112 kg (246 lb)   10/31/22 112 kg (246 lb)   09/09/22 112 kg (246 lb)     · Continue Toprol XL 50 mg daily   · Resume Torsemide  · Continue Jardiance    Diabetes mellitus, type 2 ([facility] [address] )  Assessment & Plan  Lab Results Component Value Date    HGBA1C 9 0 (H) 07/20/2022       Recent Labs     11/02/22  1626 11/02/22  2050 11/03/22  0747 11/03/22  1153   POCGLU 208* 274* 114 167*       Blood Sugar Average: Last 72 hrs:  (P) 210 [id number]   · Resume home regimen with Toujeo and Jardiance      Paroxysmal atrial fibrillation (HCC)  Assessment & Plan  History of proximal atrial fibrillation  EKG normal sinus rhythm  · Continue toprol XL 50 mg daily and Eliquis     KATRINA (obstructive sleep apnea)  Assessment & Plan  · Noncompliant with BiPAP  · Patient uses oxygen as needed    Obesity hypoventilation syndrome ([address] )  Assessment & Plan  Patient uses oxygen at night at home   · Continue oxygen supplementation at bedtime  · Not requiring daytime oxygen     Mixed hyperlipidemia  Assessment & Plan  · Crestor substituted with Pravachol      Medical Problems             Resolved Problems  Date Reviewed: 11/3/2022   None               Discharging Physician / Practitioner: Akshat Wilson  PCP: Stephanie Perry MD  Admission Date:   Admission Orders (From admission, onward)     Ordered        10/31/22 1740  INPATIENT ADMISSION  Once                      Discharge Date: 11/03/22    Consultations During Hospital Stay:  · None     Procedures Performed:   · CXR: No acute cardiopulmonary disease  Significant Findings / Test Results:   · Covid-19    Incidental Findings:   · None     Test Results Pending at Discharge (will require follow up): · Sputum culture     Outpatient Tests Requested:  · None    Complications:  None     Reason for Admission: Covid-19, SEBASTIAN    Hospital Course:   Sophia Wright is a 81 y o  female patient with a PMH including T2DM, KATRINA, obesity hypoventilation syndrome, HTN, PAF, chronic heart failure, obesity, CKD who originally presented to the hospital on 10/31/2022 due to generalized weakness, cough, fever, and chills  Patient tested positive for Covid-19   She initially required oxygen during the day which is more than her baseline requirement of nighttime oxygen  She was given a 3-day course of Remdesivir  She was treated with IVF for SEBASTIAN  On day of discharge she is back to room air  She is ambulating around her room  Her Cr has normalized  She does have a productive cough which is improving  Please see above list of diagnoses and related plan for additional information  Condition at Discharge: stable    Discharge Day Visit / Exam:   Subjective:  Patient seen and examined at bedside  She is oob in the chair on room air  She is talkative  Reports improvement in sinus congestion and post nasal drip  Continues with a moist cough  Denies HA, dizziness, CP, or SOB  Desires discharge home  Vitals: Blood Pressure: 143/67 (11/03/22 0751)  Pulse: 63 (11/03/22 0751)  Temperature: 98 °F (36 7 °C) (11/03/22 0751)  Temp Source: Oral (11/03/22 0751)  Respirations: 18 (11/03/22 0751)  Height: 5' 2" (157 5 cm) (10/31/22 1506)  Weight - Scale: 112 kg (246 lb) (10/31/22 1506)  SpO2: 95 % (11/03/22 0751)  Exam:   Physical Exam  Vitals and nursing note reviewed  Constitutional:       General: She is not in acute distress  Appearance: She is ill-appearing  She is not toxic-appearing or diaphoretic  Comments: Pleasant female resting oob in chair on room air    HENT:      Head: Normocephalic  Mouth/Throat:      Mouth: Mucous membranes are moist    Eyes:      Conjunctiva/sclera: Conjunctivae normal    Cardiovascular:      Rate and Rhythm: Normal rate  Pulmonary:      Effort: Pulmonary effort is normal       Breath sounds: Examination of the right-lower field reveals decreased breath sounds  Examination of the left-lower field reveals decreased breath sounds  Decreased breath sounds present  No wheezing, rhonchi or rales  Abdominal:      General: Bowel sounds are normal  There is no distension  Palpations: Abdomen is soft  Tenderness: There is no abdominal tenderness     Musculoskeletal:         General: Normal range of motion  Cervical back: Normal range of motion  Right lower leg: No edema  Left lower leg: No edema  Skin:     General: Skin is warm and dry  Capillary Refill: Capillary refill takes less than 2 seconds  Neurological:      Mental Status: She is alert and oriented to person, place, and time  Mental status is at baseline  Psychiatric:         Mood and Affect: Mood normal          Behavior: Behavior normal          Thought Content: Thought content normal          Judgment: Judgment normal          Discussion with Family: Updated  (niece) via phone  Discharge instructions/Information to patient and family:   See after visit summary for information provided to patient and family  Provisions for Follow-Up Care:  See after visit summary for information related to follow-up care and any pertinent home health orders  Disposition:   Home with [facility]NA Services (Reminder: Complete face to face encounter)    Planned Readmission: None      Discharge Statement:  I spent > 30 minutes discharging the patient  This time was spent on the day of discharge  I had direct contact with the patient on the day of discharge  Greater than 50% of the total time was spent examining patient, answering all patient questions, arranging and discussing plan of care with patient as well as directly providing post-discharge instructions  Additional time then spent on discharge activities  Discharge Medications:  See after visit summary for reconciled discharge medications provided to patient and/or family        **Please Note: This note may have been constructed using a voice recognition system**

## 2022-11-03 NOTE — ASSESSMENT & PLAN NOTE
Wt Readings from Last 3 Encounters:   10/31/22 112 kg (246 lb)   10/31/22 112 kg (246 lb)   09/09/22 112 kg (246 lb)     · Continue Toprol XL 50 mg daily   · Resume Torsemide  · Continue Jardiance

## 2022-11-03 NOTE — CASE MANAGEMENT
Case Management Discharge Planning Note    Patient name Rick Chavez  Location [address]-* MRN [id number]  : 1950 Date 11/3/2022       Current Admission Date: 10/31/2022  Current Admission Diagnosis:COVID-19   Patient Active Problem List    Diagnosis Date Noted   • COVID-19 10/31/2022   • Acute kidney injury superimposed on CKD ([facility] [address] ) 2022   • Rash 2022   • PSVT (paroxysmal supraventricular tachycardia) ([facility] [address] ) 2022   • Cellulitis of left upper extremity 2022   • Chronic respiratory failure with hypoxia and hypercapnia ([facility] ) 2022   • Platelets decreased ([facility] ) 2022   • Thrombophlebitis of arm, right 2022   • Elevated serum creatinine 2022   • KATRINA (obstructive sleep apnea) 2022   • Obesity hypoventilation syndrome ([facility] [address] ) 2022   • Morbid obesity ([facility] [address] ) 2022   • Lump of left breast 2022   • Pressure injury of deep tissue of sacral region 2022   • Pulmonary nodules 2022   • Chronic diastolic heart failure ([facility] ) 2022   • Peripheral venous insufficiency 2022   • Post-herpetic polyneuropathy 2022   • Raynaud's disease 2022   • Lung nodule < 6cm on CT 2022   • CKD (chronic kidney disease)    • Status post reverse total replacement of left shoulder 2022   • Closed fracture of proximal end of left humerus with routine healing 2022   • PONV (postoperative nausea and vomiting) 2022   • Diabetes mellitus, type 2 ([address] ) 2022   • Gastroesophageal reflux disease 2022   • Nephrolithiasis 2022   • Arthritis 2022   • S/P total knee replacement, right 2022   • On continuous oral anticoagulation - eliquis 2022   • Humeral head fracture 2022   • Essential hypertension 2019   • Anemia 2019   • Mixed hyperlipidemia 2019   • Paroxysmal atrial fibrillation ([facility] ) 2019   • Lower leg edema 2019   • Asthma 2018   • Morbid obesity with BMI of 45 0-49 9, adult ([facility] [address] ) 03/08/2018      LOS (days): 3  Geometric Mean LOS (GMLOS) (days): 3 60  Days to GMLOS:0 9     OBJECTIVE:  Risk of Unplanned Readmission Score: 45 13         Current admission status: Inpatient   Preferred Pharmacy:   [address] [place] [address] #[id number] Susan Rodríguezt, [address] [facility] ([address])  [id number] [place] [address] ([address])  [place] [address]  Phone: [phone] Fax: [phone]    Primary Care Provider: Juan Carlos Covington MD    Primary Insurance: MEDICARE  Secondary Insurance: [employer]IG[employer]    DISCHARGE DETAILS:      Other Referral/Resources/Interventions Provided:  Interventions: [facility]  Referral Comments: Patient accepted by MARY CARRILLO, provider reserved via [facility] updated

## 2022-11-07 ENCOUNTER — TRANSITIONAL CARE MANAGEMENT (OUTPATIENT)
Dept: INTERNAL MEDICINE CLINIC | Facility: CLINIC | Age: 72
End: 2022-11-07

## 2022-11-08 ENCOUNTER — OFFICE VISIT (OUTPATIENT)
Dept: INTERNAL MEDICINE CLINIC | Facility: CLINIC | Age: 72
End: 2022-11-08

## 2022-11-08 VITALS
HEART RATE: 64 BPM | HEIGHT: 62 IN | SYSTOLIC BLOOD PRESSURE: 112 MMHG | DIASTOLIC BLOOD PRESSURE: 80 MMHG | TEMPERATURE: 96.9 F | WEIGHT: 248 LBS | BODY MASS INDEX: 45.64 KG/M2 | OXYGEN SATURATION: 96 %

## 2022-11-08 DIAGNOSIS — J96.11 CHRONIC RESPIRATORY FAILURE WITH HYPOXIA AND HYPERCAPNIA (HCC): Chronic | ICD-10-CM

## 2022-11-08 DIAGNOSIS — E78.2 MIXED HYPERLIPIDEMIA: ICD-10-CM

## 2022-11-08 DIAGNOSIS — I48.0 PAROXYSMAL ATRIAL FIBRILLATION (HCC): ICD-10-CM

## 2022-11-08 DIAGNOSIS — I50.32 CHRONIC DIASTOLIC HEART FAILURE (HCC): ICD-10-CM

## 2022-11-08 DIAGNOSIS — Z74.09 IMPAIRED MOBILITY AND ADLS: ICD-10-CM

## 2022-11-08 DIAGNOSIS — Z12.31 SCREENING MAMMOGRAM, ENCOUNTER FOR: ICD-10-CM

## 2022-11-08 DIAGNOSIS — N63.20 MASS OF LEFT BREAST, UNSPECIFIED QUADRANT: ICD-10-CM

## 2022-11-08 DIAGNOSIS — J96.12 CHRONIC RESPIRATORY FAILURE WITH HYPOXIA AND HYPERCAPNIA (HCC): Chronic | ICD-10-CM

## 2022-11-08 DIAGNOSIS — E11.00 TYPE 2 DIABETES MELLITUS WITH HYPEROSMOLARITY WITHOUT COMA, WITHOUT LONG-TERM CURRENT USE OF INSULIN (HCC): ICD-10-CM

## 2022-11-08 DIAGNOSIS — I10 ESSENTIAL HYPERTENSION: ICD-10-CM

## 2022-11-08 DIAGNOSIS — U07.1 COVID-19: Primary | ICD-10-CM

## 2022-11-08 DIAGNOSIS — N18.32 STAGE 3B CHRONIC KIDNEY DISEASE (HCC): ICD-10-CM

## 2022-11-08 DIAGNOSIS — L89.152 PRESSURE INJURY OF SACRAL REGION, STAGE 2 (HCC): ICD-10-CM

## 2022-11-08 DIAGNOSIS — N20.0 NEPHROLITHIASIS: ICD-10-CM

## 2022-11-08 DIAGNOSIS — D69.6 PLATELETS DECREASED (HCC): ICD-10-CM

## 2022-11-08 DIAGNOSIS — Z78.9 IMPAIRED MOBILITY AND ADLS: ICD-10-CM

## 2022-11-08 DIAGNOSIS — R91.1 LUNG NODULE: ICD-10-CM

## 2022-11-08 PROBLEM — S42.293A HUMERAL HEAD FRACTURE: Status: RESOLVED | Noted: 2022-02-17 | Resolved: 2022-11-08

## 2022-11-08 PROBLEM — S42.202D CLOSED FRACTURE OF PROXIMAL END OF LEFT HUMERUS WITH ROUTINE HEALING: Status: RESOLVED | Noted: 2022-03-01 | Resolved: 2022-11-08

## 2022-11-08 PROBLEM — I80.8 THROMBOPHLEBITIS OF ARM, RIGHT: Status: RESOLVED | Noted: 2022-06-11 | Resolved: 2022-11-08

## 2022-11-08 PROBLEM — L03.114 CELLULITIS OF LEFT UPPER EXTREMITY: Status: RESOLVED | Noted: 2022-07-11 | Resolved: 2022-11-08

## 2022-11-08 PROBLEM — N18.9 ACUTE KIDNEY INJURY SUPERIMPOSED ON CKD (HCC): Status: RESOLVED | Noted: 2022-08-19 | Resolved: 2022-11-08

## 2022-11-08 PROBLEM — N17.9 ACUTE KIDNEY INJURY SUPERIMPOSED ON CKD (HCC): Status: RESOLVED | Noted: 2022-08-19 | Resolved: 2022-11-08

## 2022-11-08 RX ORDER — METOPROLOL SUCCINATE 50 MG/1
50 TABLET, EXTENDED RELEASE ORAL DAILY
Qty: 30 TABLET | Refills: 3 | Status: SHIPPED | OUTPATIENT
Start: 2022-11-08

## 2022-11-08 RX ORDER — INSULIN GLARGINE 300 U/ML
35 INJECTION, SOLUTION SUBCUTANEOUS
Qty: 4.5 ML | Refills: 3 | Status: SHIPPED | OUTPATIENT
Start: 2022-11-08

## 2022-11-08 NOTE — ASSESSMENT & PLAN NOTE
Bilateral   Patient noticed before ending up in the hospital     The recommend every 2 hour repositioning  Keep the pressure up  take vitamin-C 500 mg daily  recommend zinc 25 mg daily  Recommend protein powder 1 scoop every day    Wash every day apply barrier cream every day let visiting nurse keep an eye on that as well as family keep an eye if it gets any worse let me know you will need to go to the [address] [facility] [address]      This is a high-risk area

## 2022-11-08 NOTE — ASSESSMENT & PLAN NOTE
Lab Results   Component Value Date    HGBA1C 9 0 (H) 07/20/2022     Currently on Toujeo 35 units daily  Also currently on  Humalog per sliding scale  We talked about  how to take Humalog in her right way  Recommend fingerstick Accu-Chek at 8:00 a m , 12 noon, between 4 and 5:00 p m  As well as at between 8 and 9:00 p m     Meanwhile she will continue to do what she is doing we will teacher in a right way  Also due for hemoglobin A1c and urine for microalbumin    Due to or cost she never bought Jardiance

## 2022-11-08 NOTE — PROGRESS NOTES
Name: Ivette Tom      : 1950      MRN: [id number]  Encounter Provider: Trevor Vásquez MD  Encounter Date: 2022   Encounter department: [place] [address]  COVID-19  Assessment & Plan:  Clinically much better  Occasional cough and modest fatigue to be expected as well as nasal congestion is to be expected  Claritin 10 mg daily as well as saline nasal spray as needed for the nasal congestion and postnasal drip        recommend Mucinex DM 1 tablet twice a day for the cough as needed  2  Type 2 diabetes mellitus with hyperosmolarity without coma, without long-term current use of insulin (HCC)  Assessment & Plan:    Lab Results   Component Value Date    HGBA1C 9 0 (H) 2022     Currently on Toujeo 35 units daily  Also currently on  Humalog per sliding scale  We talked about  how to take Humalog in her right way  Recommend fingerstick Accu-Chek at 8:00 a m , 12 noon, between 4 and 5:00 p m  As well as at between 8 and 9:00 p m     Meanwhile she will continue to do what she is doing we will teacher in a right way  Also due for hemoglobin A1c and urine for microalbumin    Due to or cost she never bought Jardiance  Orders:  -     Toujeo SoloStar 300 units/mL CONCENTRATED U-300 injection pen (1-unit dial); Inject 35 Units under the skin daily at bedtime  -     Insulin Pen Needle (NovoFine Autocover) 30G X 8 MM MISC; Inject under the skin daily  -     Comprehensive metabolic panel; Future; Expected date: 2022  -     Hemoglobin A1C; Future; Expected date: 2022  -     Lipid panel; Future; Expected date: 2022  -     Microalbumin / creatinine urine ratio  -     CBC; Future; Expected date: 2022    3  Paroxysmal atrial fibrillation (HCC)  Assessment & Plan:  Remains on metoprolol succinate 50 mg daily and Eliquis 5 mg twice a day    Tolerating anticoagulation without side effect    Orders:  -     metoprolol succinate (TOPROL-XL) 50 mg 24 hr tablet; Take 1 tablet (50 mg total) by mouth daily    4  Essential hypertension  Assessment & Plan:  Hypertension well controlled  Patient remains on metoprolol succinate 50 mg daily and torsemide 20 mg 2 tablet daily    Orders:  -     Comprehensive metabolic panel; Future; Expected date: 11/22/2022  -     Hemoglobin A1C; Future; Expected date: 11/22/2022  -     Lipid panel; Future; Expected date: 11/22/2022  -     Microalbumin / creatinine urine ratio  -     CBC; Future; Expected date: 11/22/2022    5  Chronic diastolic heart failure [facility])  Assessment & Plan:  Wt Readings from Last 3 Encounters:   11/08/22 112 kg (248 lb)   10/31/22 112 kg (246 lb)   10/31/22 112 kg (246 lb)         CHF is compensated  She remains on torsemide 2 tablet daily as well as metoprolol succinate 50 mg daily as well as diabetes control      6  Nephrolithiasis    7  Stage 3b chronic kidney disease [facility])  Assessment & Plan:  Lab Results   Component Value Date    EGFR 45 11/03/2022    EGFR 39 11/02/2022    EGFR 29 11/01/2022    CREATININE 1 21 11/03/2022    CREATININE 1 35 (H) 11/02/2022    CREATININE 1 74 (H) 11/01/2022   CMP with next blood test in 2 weeks    Orders:  -     Comprehensive metabolic panel; Future; Expected date: 11/22/2022  -     CBC; Future; Expected date: 11/22/2022    8  Mixed hyperlipidemia  -     Comprehensive metabolic panel; Future; Expected date: 11/22/2022  -     Lipid panel; Future; Expected date: 11/22/2022    9  Chronic respiratory failure with hypoxia and hypercapnia (HCC)  Assessment & Plan:  Back backed oxygen only at nighttime    Orders:  -     CBC; Future; Expected date: 11/22/2022    10  Mass of left breast, unspecified quadrant  Assessment & Plan:  03/29/2022:  CT scan of chest abdomen and pelvis done during hospitalizations revealed  CHEST WALL AND LOWER NECK:   Tiny nodular density in the left breast is stable when compared with the prior study of 2020   No thyroid gland enlargement        See thinks that last mammogram was done 2 years ago  Will order another mammogram       11  Lung nodule  Assessment & Plan:  10/31/2022 chest x-ray revealed no acute disease  Previous CT scan done on 07/08/2022 had revealed increased ground-glass abnormality in the right lobe  as well as patient had lung nodules  Will need follow-up CT scan of it it was actually supposed to be done but she ended up in the hospital and it was not done  Will order for next 2 through 3 weeks    Orders:  -     CT chest wo contrast; Future; Expected date: 11/22/2022    12  Platelets decreased ([address] )  Assessment & Plan:  Lab Results   Component Value Date    WBC 5 66 11/02/2022    HGB 10 6 (L) 11/02/2022    HCT 33 9 (L) 11/02/2022    MCV 93 11/02/2022     (L) 11/02/2022         Orders:  -     CBC; Future; Expected date: 11/22/2022    13  Screening mammogram, encounter for  -     Mammo screening bilateral w 3d & cad; Future; Expected date: 11/08/2022    14  Impaired mobility and ADLs  Assessment & Plan:  Patient is getting home physical therapy  Patient will be visited by visiting nurse  Paragraphs      15  Pressure injury of sacral region, stage 2 [facility])  Assessment & Plan:  Bilateral   Patient noticed before ending up in the hospital     The recommend every 2 hour repositioning  Keep the pressure up  take vitamin-C 500 mg daily  recommend zinc 25 mg daily  Recommend protein powder 1 scoop every day    Wash every day apply barrier cream every day let visiting nurse keep an eye on that as well as family keep an eye if it gets any worse let me know you will need to go to the [address]      This is a high-risk area             Subjective     TCM Call     Date and time call was made  11/7/2022  1:28 PM    Hospital care reviewed  Records reviewed    Patient was hospitialized at  [address] [place] [address]    Date of Admission  10/31/22    Date of discharge  11/04/22    Diagnosis  Covid, Hypoxia, CKD    Disposition  Home health services; Home  Reviewed wound care on bedsores   on back side  Repositioning every two hours    Were the patients medications reviewed and updated  Yes    Current Symptoms  None; Weakness    Weakness severity  Mild  She has PT coming  TCM Call     Post hospital issues  Reduced activity    Should patient be enrolled in anticoag monitoring? No    Scheduled for follow up? Yes    Patients specialists  Pulmonlolgist    Cardiologist name  Dr Hilaria Berrios    Cardiologist contact #  [phone]    [address] name  Slime Gutierrez contact #  [phone]    Endocrinologist name  [phone]    Nephrologist name  Dr Taylor Alva    Nephrologist contact #  [phone]    Did you obtain your prescribed medications  Yes    Do you need help managing your prescriptions or medications  No    Is transportation to your appointment needed  No    Specify why  Pt does not drive, She relies on her neice to transport her  Her neice is her caregiver  I have advised the patient to call PCP with any new or worsening symptoms    Keyla Holland, Practice Administrator II    Living Arrangements  Family members    Support System  Family    The type of support provided  Physical    Do you have social support  Yes, as much as I need    Are you recieving any outpatient services  No    Are you recieving home care services  Yes    Types of home care services  Home PT; Home RT; Nurse visit    Are you using any community resources  No    Current waiver services  No    Have you fallen in the last 12 months  Yes    How many times  Was hospitalized and went to rehab    Interperter language line needed  No    Counseling  Patient    Counseling topics  Importance of RX compliance; Activities of daily   living  Repsitioning every 2 hours    Comments  Asked pt to get an appt with Cardiology sooner  Not to wait   unti her appt in 1 month   Her TCM visit with PCP is here in 2days   considering that she is high risk                   70-year-old female with multiple comorbidities including diabetes, obstructive sleep apnea, obesity, hypo ventilation syndrome, hypertension, paroxysmal atrial fibrillation, chronic congestive heart failure, obesity, CKD who presented with COVID-19  Patient has significant generalized weakness cough fever chills patient looked pretty sick when we did a face time  Patient was referred to the emergency room  Patient was requiring increased amount of the oxygen from the baseline how at nighttime  Patient was hospitalized patient was given 3 day course of remdesivir  Patient treated with IV fluid for acute kidney injury  On the day of the discharge patient was back on room air  Discharge weight has been stable  246 lb    COVID-19 workup includes troponin 7, proBNP of 718, D-dimer 0 91, CK 41, procalcitonin 0 11, C-reactive protein 73 4, patient also received insert spirometer    Other chronic disease were managed as follow next  Hyperlipidemia patient is on Crestor which was substituted with Pravachol in hospital  Obesity with hypoventilation syndrome:  Patient remains on supplemental oxygen at bedtime not requiring during daytime  Voice a noncompliant with BiPAP  Paroxysmal atrial fibrillation:  Patient remains on Toprol XL 50 mg daily as well as Eliquis  Diabetes:  Blood sugar and hospital setting were reviewed, 07/20/2022:  Hemoglobin A1c was 9 0, home diabetes regimen is to gyn Jardiance 10 mg daily  CHF chronic diastolic discharge weight 246 patient's current regimen includes Toprol XL 50 mg daily torsemide 20 mg daily and Jardiance 10 mg daily  Cardiac:  Echocardiogram ejection fraction 53% in March 2022  Acute kidney injury admission creatinine 1 74 discharge creatinine 1 21 with GFR 45  Baseline creatinine range 1 7 to 1 9      Patient is doing very well  Offers no specific complaint except some modest fatigue as well as some mild cough    She does have some postnasal drip     Offers no other complaint  See does report that she does have a skin integrity impairment and has been gained using barrier cream   Patient thinks is not too bad  Patient reports it opened up before she ended up in the hospital        Review of Systems   Constitutional: Negative for chills, fatigue, fever and unexpected weight change  HENT: Negative for ear pain, postnasal drip, sinus pain and sore throat  Eyes: Negative for pain and redness  Respiratory: Negative for cough and shortness of breath  Cardiovascular: Negative for chest pain, palpitations and leg swelling  Gastrointestinal: Negative for abdominal pain, diarrhea and nausea  Endocrine: Negative for cold intolerance, polydipsia and polyuria  Genitourinary: Negative for dysuria, frequency and urgency  Musculoskeletal: Negative for arthralgias, gait problem and myalgias  Skin: Negative for rash  Allergic/Immunologic: Negative  Neurological: Negative for dizziness and headaches  Hematological: Negative for adenopathy  Psychiatric/Behavioral: Negative for behavioral problems         Past Medical History:   Diagnosis Date   • Anemia    • Asthma    • COVID-19     January 2022   • GERD (gastroesophageal reflux disease)    • Hyperlipidemia    • PONV (postoperative nausea and vomiting)    • SVT (supraventricular tachycardia) ([facility])      Past Surgical History:   Procedure Laterality Date   • APPENDECTOMY     • CYSTOSCOPY W/ LASER LITHOTRIPSY Left 7/12/2016    Procedure: CYSTOSCOPY URETEROSCOPY WITH LITHOTRIPSY HOLMIUM LASER, RETROGRADE PYELOGRAM AND INSERTION STENT URETERAL;  Surgeon: Deangelo Grimes MD;  Location: [address] [place] [address];  Service:    • DILATION AND CURETTAGE OF UTERUS     • IR THORACENTESIS  3/18/2022   • JOINT REPLACEMENT      right knee   • KNEE ARTHROPLASTY Right    • AR CYSTOURETHROSCOPY,URETER CATHETER Left 6/29/2016    Procedure: CYSTOSCOPY RETROGRADE PYELOGRAM WITH INSERTION STENT URETERAL, left;  Surgeon: Genaro Mishra MD;  Location: [facility];  Service: Urology   • KS RECONSTR TOTAL SHOULDER IMPLANT Left 3/1/2022    Procedure: ARTHROPLASTY SHOULDER REVERSE;  Surgeon: Luis Morris MD;  Location: [facility];  Service: Orthopedics   • SHOULDER ARTHROTOMY Left      Family History   Problem Relation Age of Onset   • Heart disease Mother    • Emphysema Maternal Grandmother    • Heart disease Family      Social History     Socioeconomic History   • Marital status: Single     Spouse name: None   • Number of children: None   • Years of education: None   • Highest education level: None   Occupational History   • None   Tobacco Use   • Smoking status: Former Smoker     Packs/day:      Years: 20 00     Pack years: 20      Types: Cigarettes     Quit date: 1996     Years since quittin 3   • Smokeless tobacco: Never Used   Vaping Use   • Vaping Use: Never used   Substance and Sexual Activity   • Alcohol use: Not Currently     Comment: rarely   • Drug use: No   • Sexual activity: Not Currently   Other Topics Concern   • None   Social History Narrative   • None     Social Determinants of Health     Financial Resource Strain: Not on file   Food Insecurity: No Food Insecurity   • Worried About Running Out of Food in the Last Year: Never true   • Ran Out of Food in the Last Year: Never true   Transportation Needs: No Transportation Needs   • Lack of Transportation (Medical): No   • Lack of Transportation (Non-Medical):  No   Physical Activity: Not on file   Stress: Not on file   Social Connections: Not on file   Intimate Partner Violence: Not on file   Housing Stability: Low Risk    • Unable to Pay for Housing in the Last Year: No   • Number of Places Lived in the Last Year: 2   • Unstable Housing in the Last Year: No     Current Outpatient Medications on File Prior to Visit   Medication Sig   • acetaminophen (TYLENOL) 325 mg tablet Take 650 mg by mouth every 6 (six) hours as needed for mild pain     • albuterol (PROVENTIL HFA,VENTOLIN HFA) 90 mcg/act inhaler Inhale 2 puffs every 6 (six) hours as needed for wheezing   • ammonium lactate (LAC-HYDRIN) 12 % lotion APPLY TOPICALLY TO AFFECTED AREAS twice a day   • apixaban (ELIQUIS) 5 mg Take 1 tablet (5 mg total) by mouth in the morning and 1 tablet (5 mg total) in the evening  • docusate sodium (COLACE) 100 mg capsule Take 100 mg by mouth in the morning   • fluticasone (FLONASE) 50 mcg/act nasal spray 1 spray into each nostril daily   • glucose blood test strip Use 1 each 2 (two) times a day Use one 2 times daily   • guaiFENesin (MUCINEX) 600 mg 12 hr tablet Take 1 tablet (600 mg total) by mouth every 12 (twelve) hours for 6 days   • insulin lispro (HumaLOG KwikPen) 100 units/mL injection pen 3 times with meal according to sliding scale - >Blood Glucose 150 - 199: 2 Unit of Insulin, Blood Glucose 200 - 249: 4 Units of Insulin, Blood Glucose 250 - 299: 6 Units of Insulin, Blood Glucose 300 - 349: 8 Units of Insulin, Blood Glucose 350 - 399: 10 Units of Inuslin,Blood Glucose greater than or equal to 400: 12 Units of Insulin-please contact your physician   • Insulin Pen Needle (BD Pen Needle Pilar 2nd Gen) 32G X 4 MM MISC For use with insulin pen  Pharmacy may dispense brand covered by insurance     • Insulin Pen Needle 30G X 8 MM MISC 2 (two) times a day   • Lancets (onetouch ultrasoft) lancets Use once daily   • montelukast (SINGULAIR) 10 mg tablet Take 10 mg by mouth daily     • pregabalin (LYRICA) 100 mg capsule Take 1 capsule (100 mg total) by mouth 2 (two) times a day   • rosuvastatin (CRESTOR) 10 MG tablet Take 1 tablet (10 mg total) by mouth daily   • torsemide (DEMADEX) 20 mg tablet Take 2 tablets in the AM   • [DISCONTINUED] Empagliflozin (Jardiance) 10 MG TABS Take 1 tablet (10 mg total) by mouth every morning   • [DISCONTINUED] Insulin Pen Needle (NovoFine Autocover) 30G X 8 MM MISC Inject under the skin daily   • [DISCONTINUED] metoprolol succinate (TOPROL-XL) 50 mg 24 hr tablet Take 1 tablet (50 mg total) by mouth daily   • [DISCONTINUED] Toujeo SoloStar 300 units/mL CONCENTRATED U-300 injection pen (1-unit dial) Inject 25 Units under the skin daily at bedtime   • b complex-vitamin C-folic acid (NEPHROCAPS) 1 mg capsule Take 1 capsule by mouth daily     Allergies   Allergen Reactions   • Penicillins Hives   • Moxifloxacin Other (See Comments)     unknown   • Percocet [Oxycodone-Acetaminophen] Other (See Comments)     unknown   • Zinc Acetate Other (See Comments)     unknown   • Asa [Aspirin] GI Intolerance   • Indocin [Indomethacin] Other (See Comments)     Made patient "loopy"   • Other Other (See Comments)     unknown     Immunization History   Administered Date(s) Administered   • COVID-19 MODERNA VACC 0 5 ML IM 02/11/2022       Objective     /80   Pulse 64   Temp (!) 96 9 °F (36 1 °C)   Ht 5' 2" (1 575 m)   Wt 112 kg (248 lb)   SpO2 96%   BMI 45 36 kg/m²     Physical Exam  Vitals reviewed  Exam conducted with a chaperone present  Constitutional:       General: She is not in acute distress  Appearance: She is well-developed  She is obese  She is not ill-appearing, toxic-appearing or diaphoretic  HENT:      Head: Normocephalic and atraumatic  Right Ear: External ear normal       Left Ear: External ear normal       Nose: No congestion or rhinorrhea  Eyes:      General: Lids are normal          Right eye: No discharge  Left eye: No discharge  Conjunctiva/sclera: Conjunctivae normal    Neck:      Thyroid: No thyroid mass, thyromegaly or thyroid tenderness  Vascular: No carotid bruit or JVD  Trachea: Trachea normal    Cardiovascular:      Rate and Rhythm: Rhythm irregular  Heart sounds: Normal heart sounds  No murmur heard  Pulmonary:      Effort: No respiratory distress  Breath sounds: Normal breath sounds  No wheezing, rhonchi or rales     Abdominal:      General: Bowel sounds are normal  There is no distension  Palpations: There is no mass  Tenderness: There is no abdominal tenderness  There is no rebound  Musculoskeletal:      Right lower le+ Pitting Edema present  Left lower le+ Pitting Edema present  Lymphadenopathy:      Cervical: No cervical adenopathy  Skin:     General: Skin is warm  Coloration: Skin is not jaundiced or pale  Findings: No rash  Comments: On the right buttock area on inner side 1 x 1 cm irregular stage 2 and on the left side rectangle 1 x 2 cm on the left buttock area stage2  no cellulitis  No tunneling  No discharge  No foul smell  Neurological:      Mental Status: She is alert  Coordination: Coordination normal       Gait: Gait abnormal    Psychiatric:         Mood and Affect: Mood normal          Behavior: Behavior normal          Thought Content:  Thought content normal          Judgment: Judgment normal        Jarad Austin MD

## 2022-11-08 NOTE — ASSESSMENT & PLAN NOTE
Hypertension well controlled    Patient remains on metoprolol succinate 50 mg daily and torsemide 20 mg 2 tablet daily

## 2022-11-08 NOTE — ASSESSMENT & PLAN NOTE
10/31/2022 chest x-ray revealed no acute disease  Previous CT scan done on 07/08/2022 had revealed increased ground-glass abnormality in the right lobe  as well as patient had lung nodules  Will need follow-up CT scan of it it was actually supposed to be done but she ended up in the hospital and it was not done    Will order for next 2 through 3 weeks

## 2022-11-08 NOTE — ASSESSMENT & PLAN NOTE
Lab Results   Component Value Date    EGFR 45 11/03/2022    EGFR 39 11/02/2022    EGFR 29 11/01/2022    CREATININE 1 21 11/03/2022    CREATININE 1 35 (H) 11/02/2022    CREATININE 1 74 (H) 11/01/2022   CMP with next blood test in 2 weeks

## 2022-11-08 NOTE — ASSESSMENT & PLAN NOTE
Remains on metoprolol succinate 50 mg daily and Eliquis 5 mg twice a day    Tolerating anticoagulation without side effect

## 2022-11-08 NOTE — ASSESSMENT & PLAN NOTE
Lab Results   Component Value Date    WBC 5 66 11/02/2022    HGB 10 6 (L) 11/02/2022    HCT 33 9 (L) 11/02/2022    MCV 93 11/02/2022     (L) 11/02/2022

## 2022-11-08 NOTE — ASSESSMENT & PLAN NOTE
Wt Readings from Last 3 Encounters:   11/08/22 112 kg (248 lb)   10/31/22 112 kg (246 lb)   10/31/22 112 kg (246 lb)         CHF is compensated    She remains on torsemide 2 tablet daily as well as metoprolol succinate 50 mg daily as well as diabetes control

## 2022-11-08 NOTE — PATIENT INSTRUCTIONS
Claritin 10 mg daily as well as saline nasal spray as needed for the nasal congestion and postnasal drip        recommend Mucinex DM 1 tablet twice a day for the cough as needed  Follow with Consultants as per their and our suggestion    Follow up in one month or as needed earlier    Follow all instructions as advised and discussed  Take your medications as prescribed  Call the office immediately if you experience any side effects  Ask questions if you do not understand  Keep your scheduled appointment as advised or come sooner if necessary or in doubt  I am ordering mammogram as well as CT scan of the chest that you are supposed to get it done  Also get the blood test before you come next time      Recommend frequent positioning every 2 hours  take vitamin-C 500 mg zinc 25 mg daily  Take protein powder daily 1 tablespoonful   have summary check your back every day  Watch daily as well as apply barrier cream   If it gets any worse you we will need to send you to the [address] please let us know this is a high-risk area            Best time to call for non-urgent matter or questions on weekdays is between 9am and 12 noon  See physician for any new symptoms or worsening of current symptoms  Urgent or emergent situations call 911 and report to nearest emergency room      I spent  30 -40 minutes taking care of this patient including clinical care, conseling, collaboration, chart, lab and consultaion review as appropriate    Patient is to get labs 1 week(s) prior to next visit if advised

## 2022-11-08 NOTE — ASSESSMENT & PLAN NOTE
Clinically much better  Occasional cough and modest fatigue to be expected as well as nasal congestion is to be expected  Claritin 10 mg daily as well as saline nasal spray as needed for the nasal congestion and postnasal drip        recommend Mucinex DM 1 tablet twice a day for the cough as needed

## 2022-11-08 NOTE — ASSESSMENT & PLAN NOTE
03/29/2022:  CT scan of chest abdomen and pelvis done during hospitalizations revealed  CHEST WALL AND LOWER NECK:   Tiny nodular density in the left breast is stable when compared with the prior study of 2020  No thyroid gland enlargement        See thinks that last mammogram was done 2 years ago    Will order another mammogram

## 2022-11-29 ENCOUNTER — LAB (OUTPATIENT)
Dept: LAB | Facility: CLINIC | Age: 72
End: 2022-11-29

## 2022-11-29 ENCOUNTER — RA CDI HCC (OUTPATIENT)
Dept: OTHER | Facility: HOSPITAL | Age: 72
End: 2022-11-29

## 2022-11-29 ENCOUNTER — HOSPITAL ENCOUNTER (OUTPATIENT)
Dept: RADIOLOGY | Facility: HOSPITAL | Age: 72
Discharge: HOME | End: 2022-11-29
Attending: INTERNAL MEDICINE

## 2022-11-29 DIAGNOSIS — E78.2 MIXED HYPERLIPIDEMIA: ICD-10-CM

## 2022-11-29 DIAGNOSIS — R91.1 LUNG NODULE: ICD-10-CM

## 2022-11-29 DIAGNOSIS — J96.11 CHRONIC RESPIRATORY FAILURE WITH HYPOXIA AND HYPERCAPNIA (HCC): Chronic | ICD-10-CM

## 2022-11-29 DIAGNOSIS — D69.6 PLATELETS DECREASED (HCC): ICD-10-CM

## 2022-11-29 DIAGNOSIS — J96.12 CHRONIC RESPIRATORY FAILURE WITH HYPOXIA AND HYPERCAPNIA (HCC): Chronic | ICD-10-CM

## 2022-11-29 DIAGNOSIS — E11.00 TYPE 2 DIABETES MELLITUS WITH HYPEROSMOLARITY WITHOUT COMA, WITHOUT LONG-TERM CURRENT USE OF INSULIN (HCC): ICD-10-CM

## 2022-11-29 DIAGNOSIS — N18.32 STAGE 3B CHRONIC KIDNEY DISEASE (HCC): ICD-10-CM

## 2022-11-29 DIAGNOSIS — I10 ESSENTIAL HYPERTENSION: ICD-10-CM

## 2022-11-29 LAB
ALBUMIN SERPL BCP-MCNC: 3.1 G/DL (ref 3.5–5)
ALP SERPL-CCNC: 92 U/L (ref 46–116)
ALT SERPL W P-5'-P-CCNC: 20 U/L (ref 12–78)
ANION GAP SERPL CALCULATED.3IONS-SCNC: 3 MMOL/L (ref 4–13)
AST SERPL W P-5'-P-CCNC: 9 U/L (ref 5–45)
BILIRUB SERPL-MCNC: 0.37 MG/DL (ref 0.2–1)
BUN SERPL-MCNC: 48 MG/DL (ref 5–25)
CALCIUM ALBUM COR SERPL-MCNC: 9.9 MG/DL (ref 8.3–10.1)
CALCIUM SERPL-MCNC: 9.2 MG/DL (ref 8.3–10.1)
CHLORIDE SERPL-SCNC: 97 MMOL/L (ref 96–108)
CO2 SERPL-SCNC: 35 MMOL/L (ref 21–32)
CREAT SERPL-MCNC: 1.7 MG/DL (ref 0.6–1.3)
ERYTHROCYTE [DISTWIDTH] IN BLOOD BY AUTOMATED COUNT: 14.4 % (ref 11.6–15.1)
EST. AVERAGE GLUCOSE BLD GHB EST-MCNC: 283 MG/DL
GFR SERPL CREATININE-BSD FRML MDRD: 30 ML/MIN/1.73SQ M
GLUCOSE SERPL-MCNC: 342 MG/DL (ref 65–140)
HBA1C MFR BLD: 11.5 %
HCT VFR BLD AUTO: 35.6 % (ref 34.8–46.1)
HGB BLD-MCNC: 10.9 G/DL (ref 11.5–15.4)
MCH RBC QN AUTO: 28.9 PG (ref 26.8–34.3)
MCHC RBC AUTO-ENTMCNC: 30.6 G/DL (ref 31.4–37.4)
MCV RBC AUTO: 94 FL (ref 82–98)
PLATELET # BLD AUTO: 210 THOUSANDS/UL (ref 149–390)
PMV BLD AUTO: 11.4 FL (ref 8.9–12.7)
POTASSIUM SERPL-SCNC: 4.7 MMOL/L (ref 3.5–5.3)
PROT SERPL-MCNC: 7.3 G/DL (ref 6.4–8.4)
RBC # BLD AUTO: 3.77 MILLION/UL (ref 3.81–5.12)
SODIUM SERPL-SCNC: 135 MMOL/L (ref 135–147)
WBC # BLD AUTO: 9.46 THOUSAND/UL (ref 4.31–10.16)

## 2022-11-29 NOTE — PROGRESS NOTES
[id number]  [address]  coding opportunities          Chart Reviewed number of suggestions sent to Provider: 1     Patients Insurance     Medicare Insurance: [employer]

## 2022-12-03 NOTE — ASSESSMENT & PLAN NOTE
Call placed to agency PICC team for PICC placement. Was told our in house PICC team is here today on Saturday. Pt is afebrile this am. He will have his PICC placed today.   Wt Readings from Last 3 Encounters:   03/21/22 119 kg (263 lb 5 oz)   03/08/22 135 kg (297 lb 9 9 oz)   03/03/22 127 kg (279 lb 15 8 oz)     · SOB POA, secondary to acute on chronic diastolic CHF  BNP 5992  CT shows small b/l plerual effusions   · Recent shoulder surgery, [facility], not on diuretics at [facility] (previously on torsemide 10mg/d)  · ECHO performed 3/10 shows EF 95%, systolic function low normal, borderline concentric hypertrophy as per Cardiology report  · Procalcitonin negative - would hold off abx for aspiration related air space opacities seen on CT    · CTA chest negative for pulmonary embolism   · Patient had developed some SOB on 3/13; repeat chest xray continues to show pulmonary congestion  · Patient was on Lasix 60 milligram IV q 12 hours with albumin which was later changed to torsemide 20 milligram p o  Daily on 3/16/22  · Continue 2 liters oxygen  Patient arranged for home oxygen  · Continue metoprolol 50 mg q 12 hours  · Continue intake and output, daily weights  · Patient developed shortness of breath with wheezing again this morning which improved slightly with nebulizer treatment  Repeat chest x-ray showing persistent pulmonary vascular congestion  CT scan of the chest was ordered which showed septal thickening and alveolar nodules due to interstitial and alveolar edema with persistent moderate effusions  · Patient received Lasix 80 milligram IV and torsemide 20 milligram on March 17, 2022  · Patient has been started on Lasix drip at 10 milligram/hour on March 18, 2022  Continue Lasix drip at 10 milligram/hour for today  · Patient underwent thoracentesis with removal of 500 milliliters of pleural fluid from the right and 300 mL from the left  Pleural fluid analysis suggesting transudative effusion  · Chest x-ray showing improving pulmonary vascular congestion with trace right costophrenic angle effusion  · Lasix drip was stopped today as creatinine is increasing    Possible transition to p o  Torsemide in a m

## 2022-12-06 ENCOUNTER — OFFICE VISIT (OUTPATIENT)
Dept: INTERNAL MEDICINE CLINIC | Facility: CLINIC | Age: 72
End: 2022-12-06

## 2022-12-06 VITALS
HEIGHT: 62 IN | WEIGHT: 254 LBS | DIASTOLIC BLOOD PRESSURE: 78 MMHG | HEART RATE: 68 BPM | SYSTOLIC BLOOD PRESSURE: 138 MMHG | OXYGEN SATURATION: 100 % | BODY MASS INDEX: 46.74 KG/M2 | TEMPERATURE: 97.7 F

## 2022-12-06 DIAGNOSIS — I48.0 PAROXYSMAL ATRIAL FIBRILLATION (HCC): ICD-10-CM

## 2022-12-06 DIAGNOSIS — N63.20 MASS OF LEFT BREAST, UNSPECIFIED QUADRANT: ICD-10-CM

## 2022-12-06 DIAGNOSIS — S90.829A: ICD-10-CM

## 2022-12-06 DIAGNOSIS — G47.33 OSA (OBSTRUCTIVE SLEEP APNEA): ICD-10-CM

## 2022-12-06 DIAGNOSIS — S90.821A BLISTER OF RIGHT HEEL, INITIAL ENCOUNTER: ICD-10-CM

## 2022-12-06 DIAGNOSIS — L89.152 PRESSURE INJURY OF SACRAL REGION, STAGE 2 (HCC): ICD-10-CM

## 2022-12-06 DIAGNOSIS — R91.1 LUNG NODULE: ICD-10-CM

## 2022-12-06 DIAGNOSIS — I10 ESSENTIAL HYPERTENSION: ICD-10-CM

## 2022-12-06 DIAGNOSIS — D64.9 ANEMIA, UNSPECIFIED TYPE: ICD-10-CM

## 2022-12-06 DIAGNOSIS — E11.00 TYPE 2 DIABETES MELLITUS WITH HYPEROSMOLARITY WITHOUT COMA, WITHOUT LONG-TERM CURRENT USE OF INSULIN (HCC): ICD-10-CM

## 2022-12-06 DIAGNOSIS — E78.2 MIXED HYPERLIPIDEMIA: ICD-10-CM

## 2022-12-06 DIAGNOSIS — D69.6 PLATELETS DECREASED (HCC): ICD-10-CM

## 2022-12-06 DIAGNOSIS — I47.1 PSVT (PAROXYSMAL SUPRAVENTRICULAR TACHYCARDIA) (HCC): ICD-10-CM

## 2022-12-06 DIAGNOSIS — I50.32 CHRONIC DIASTOLIC HEART FAILURE (HCC): ICD-10-CM

## 2022-12-06 DIAGNOSIS — N18.32 STAGE 3B CHRONIC KIDNEY DISEASE (HCC): ICD-10-CM

## 2022-12-06 DIAGNOSIS — U07.1 COVID-19: Primary | ICD-10-CM

## 2022-12-06 DIAGNOSIS — J45.909 MILD ASTHMA WITHOUT COMPLICATION, UNSPECIFIED WHETHER PERSISTENT: ICD-10-CM

## 2022-12-06 NOTE — ASSESSMENT & PLAN NOTE
Recommend to continue barrier cream      multivitamin once a day  Will refer to the wound care center for your heel blister please also have them check it  Frequent repositioning  Have family check it out every day for any worsening

## 2022-12-06 NOTE — ASSESSMENT & PLAN NOTE
COVID-19 resolved  Patient does not have any further cough  Breathing is fairly good notice any symptoms related to COVID-19

## 2022-12-06 NOTE — PROGRESS NOTES
Name: Vanda De Jesus      : 1950      MRN: [id number]  Encounter Provider: Sumi Almazan MD  Encounter Date: 2022   Encounter department: [address]     1  COVID-19  Assessment & Plan:  COVID-19 resolved  Patient does not have any further cough  Breathing is fairly good notice any symptoms related to COVID-19       2  Pressure injury of sacral region, stage 2 (HCC)  Assessment & Plan:  Recommend to continue barrier cream      multivitamin once a day  Will refer to the wound care center for your heel blister please also have them check it  Frequent repositioning  Have family check it out every day for any worsening  Orders:  -     Ambulatory Referral to Wound Care; Future; Expected date: 2022    3  Platelets decreased ([address] )    4  Lung nodule  Assessment & Plan:  2022: CT scan of the chest:  Evaluation is limited by artifact on current examination  Within those limitations there appears to be 2 new sub-6 mm groundglass nodules in the right upper lobe  Additional bilateral pulmonary nodules are stable  Previously seen groundglass nodules   in the right lower lobe which were new on prior examination appear to have resolved  Previously reported enlarging nodule in the right lower lobe measuring 9 mm appears stable       Recommend 3 month CT follow-up  Recommend to see pulmonologist regarding lung nodules multiple with couple of new but couple of previous are not better or resolved  Will schedule CT scan of the chest   Patient will call and set up appointment with pulmonologist    Orders:  -     CT chest wo contrast; Future; Expected date: 2023    5  Mass of left breast, unspecified quadrant    6   Stage 3b chronic kidney disease [facility])  Assessment & Plan:  Lab Results   Component Value Date    EGFR 30 2022    EGFR 45 2022    EGFR 39 2022    CREATININE 1 70 (H) 2022    CREATININE 1 21 11/03/2022    CREATININE 1 35 (H) 11/02/2022   GFR is baseline  Creat is baseline  Recommend periodic blood test for monitoring of BUN and Creat  Avoid Non Steroidal Antiinflammatory such as ibuprofen, aleve etc   Potassium, HCO3 and calcium are wnl and will require periodic blood test   Bone and Mineral disease monitoring as appropriate  All patient with GFR less than 30( CKD 4 or 5 or 6) advised to follow with nephrologist         7  Essential hypertension  Assessment & Plan:  BP controlled    Sx free    Continue torsemide and metoprolol,    Orders:  -     Ambulatory referral to Endocrinology; Future  -     Comprehensive metabolic panel; Future; Expected date: 12/20/2022  -     Lipid panel; Future; Expected date: 12/20/2022    8  Chronic diastolic heart failure (HCC)  Assessment & Plan:  Wt Readings from Last 3 Encounters:   12/06/22 115 kg (254 lb)   11/08/22 112 kg (248 lb)   10/31/22 112 kg (246 lb)         Compensated  3- echo and nuclear stress test 8- reviewe    Continue DM control, Metoprolol, rosuvastatin, torsemide, eliquis    Pt will continue to follow with cardiologist      9  Type 2 diabetes mellitus with hyperosmolarity without coma, without long-term current use of insulin [facility])  Assessment & Plan:    Lab Results   Component Value Date    HGBA1C 11 5 (H) 11/29/2022     We talked about taking Humalog on regular basis to cover sugar responding to that particular meal on regular basis  I recommend that she check sugars between 7 and 8 AM, 11 and 11:30 AM, 3 to 4 pm and between 8 and 9 PM     's is definitely eat breakfast and regular timely manner as well as eat dinner in timely manner ,  She should consider eating some lunch on a regular basis  She generally speaking between 4 and 8 units of Humalog with breakfast, generally speaking she takes between 4 and 6 units of Humalog with dinner  We talked about we need a multiple different numbers to home use the right regiment  Recommend to keep track of at least 3 out of 4 times sugar and bring with her in the next visit  Meanwhile I would recommend #1 increase Toujeo 10 units daily morning and 35 units to be continued at nighttime  Meanwhile recommend to take her Humalog 6 units with breakfast and 6 units with the dinner  Bring me sugar report in next in next 2 weeks  Drop me of the report  Also strongly recommend to see endocrinologist     Orders:  -     Ambulatory referral to Endocrinology; Future  -     Comprehensive metabolic panel; Future; Expected date: 12/20/2022  -     Lipid panel; Future; Expected date: 12/20/2022    10  Paroxysmal atrial fibrillation (HCC)    11  Anemia, unspecified type  Assessment & Plan:  Lab Results   Component Value Date    WBC 9 46 11/29/2022    HGB 10 9 (L) 11/29/2022    HCT 35 6 11/29/2022    MCV 94 11/29/2022     11/29/2022   baseline        12  Mild asthma without complication, unspecified whether persistent  Assessment & Plan:  No major asthma sx    Continue singulari 10 mg daily, albuterol as needed    PFt reviewed from 5-      13  KATRINA (obstructive sleep apnea)  Assessment & Plan:  Pulmonary: Obstructive sleep apnea: Under care of pulmonologist on BiPAP 12/6,  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:  Continue BiPAP      14  PSVT (paroxysmal supraventricular tachycardia) (HCC)  Assessment & Plan:  No palpitation    Continue eliquis and metoprolol      15   Mixed hyperlipidemia  Assessment & Plan:  Lab Results   Component Value Date    LDLCALC 31 07/20/2022     Lab Results   Component Value Date    ALT 20 11/29/2022     Lab Results   Component Value Date    CHOLESTEROL 141 07/20/2022    CHOLESTEROL 104 03/10/2022     Lab Results   Component Value Date    HDL 35 (L) 07/20/2022    HDL 30 (L) 03/10/2022     Lab Results   Component Value Date    TRIG 376 (H) 07/20/2022    TRIG 132 03/10/2022     Lab Results   Component Value Date    [place] 106 07/20/2022     Will continue crestor 10 mg daily    Orders:  -     Comprehensive metabolic panel; Future; Expected date: 12/20/2022  -     Lipid panel; Future; Expected date: 12/20/2022    16  Blister of right heel, initial encounter  Assessment & Plan:  1 week of right heel unstageable blister with the intact skin    Will refer to the wound care center for further management  Recommend to keep the weight off  Orders:  -     Ambulatory Referral to Wound Care; Future; Expected date: 12/07/2022    17  Blister of heel, unspecified laterality, initial encounter  Assessment & Plan: Will refer to the wound care center for your heel blister please also have them check it  Frequent repositioning  Have family check it out every day for any worsening  New    For one week    Intact skin             Subjective               Pt is here for multiple chronic disease and new heel blister    Pt reports blister on heel intact skin, for one week, no discarge, no redness, Pt is diabetic, d/w family, would refer to Wound care center, family to watch daily, keep pressure off  Butock: Bilateral buttock macerated skin, Shedoes have a skin integrity impairment and has been  using barrier cream, not any worse- pt at risk explained, will refer to wound care center    Other chronic disease were managed as follow   Hyperlipidemia patient is on Crestor  Obesity with hypoventilation syndrome:  Patient remains on supplemental oxygen at bedtime not requiring during daytime  noncompliant with BiPAP  Paroxysmal atrial fibrillation:  Patient remains on Toprol XL 50 mg daily as well as Eliquis  Diabetes: We talked about taking Humalog on regular basis to cover sugar responding to that particular meal on regular basis    I recommend that she check sugars between 7 and 8 AM, 11 and 11:30 AM, 3 to 4 pm and between 8 and 9 PM     's is definitely eat breakfast and regular timely manner as well as eat dinner in timely manner ,  She should consider eating some lunch on a regular basis  She generally speaking between 4 and 8 units of Humalog with breakfast, generally speaking she takes between 4 and 6 units of Humalog with dinner  We talked about we need a multiple different numbers to home use the right regiment  Recommend to keep track of at least 3 out of 4 times sugar and bring with her in the next visit  Meanwhile I would recommend #1 increase Toujeo 10 units daily morning and 35 units to be continued at nighttime  Meanwhile recommend to take her Humalog 6 units with breakfast and 6 units with the dinner  Bring me sugar report in next in next 2 weeks  Drop me of the report  Also strongly recommend to see endocrinologist       CHF chronic diastolic discharge weight 246 patient's current regimen includes Toprol XL 50 mg daily torsemide 20 mg daily and Jardiance 10 mg daily  Cardiac:  Echocardiogram ejection fraction 53% in March 2022  No significant SOB, edema fair weight acceptable, no       08/26/2022:  Nuclear stress test:  •  Stress ECG: No ST deviation is noted  The ECG was not diagnostic due to pharmacological (vasodilator) stress  •  Perfusion: There is a left ventricular perfusion defect that is small in size with mild reduction in uptake present in the anterior and apex location(s) that is fixed  The defect appears to be an artifact caused by breast attenuation  •  Stress Function: Left ventricular function post-stress is normal  Post-stress ejection fraction is 70 %      No ischemia seen  Breast attenuation and left arm in field of image  Pt unable to raise arm due to recent shoulder surgery  March 10, 2022 echocardiogram:     •  Left Ventricle: Left ventricular cavity size is normal  Wall thickness is mildly increased  The left ventricular ejection fraction is 53% by biplane measurement  Systolic function is low normal  Wall motion is normal  There is borderline concentric hypertrophy    •  Right Ventricle: Systolic function is low normal  Normal tricuspid annular plane systolic excursion (TAPSE) > 1 7 cm  •  Study Details: This transthoracic echocardiogram was performed at bedside  This was a routine, inpatient study  Study quality was poor  This was a technically difficult study due to decreased penetration, restricted mobility and poor acoustic windows  A complete 2D, color flow Doppler, spectral Doppler, 2D, color flow Doppler and spectral Doppler transthoracic echocardiogram was performed  The apical, parasternal, subcostal and suprasternal views were obtained  0 6 ml/min of Definity ultrasound enhancing agent was used due to opacify the left ventricle     •  Prior TTE study available for comparison  Prior study date: 5/29/2019  No significant changes noted compared to the prior study       CKD lelve 3/4; renal function acceptable,     Pulmonary: Obstructive sleep apnea: Under care of pulmonologist on BiPAP 12/6,  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:  Continue BiPAP    Result Date: 5/27/2022  Results: FEV1/FVC Ratio: 73 % Forced Vital Capacity: 0 99 L 36 % predicted FEV1: 0 72 L 34 % predicted Interpretation: · Spirometry suggests abnormal lung volumes  Recommend checking lung volumes for further evaluation  · No significant response to the administration to bronchodilator per ATS standards · Flow volume loop is restricted  CT scan of the chest, 11/29/2022:  Evaluation is limited by artifact on current examination  Within those limitations there appears to be 2 new sub-6 mm groundglass nodules in the right upper lobe  Additional bilateral pulmonary nodules are stable  Previously seen groundglass nodules   in the right lower lobe which were new on prior examination appear to have resolved  Previously reported enlarging nodule in the right lower lobe measuring 9 mm appears stable       Recommend 3 month CT follow-up  Will proceed with a follow-up CT scan back in 3 months for surveillance    She does not have any pulmonary symptoms  Recommend to get the opinion of pulmonologist about multiple nodule  Lower extremity edema:  Mild weight ggain now 254;  250 lb here discharge weight was 244    Hypertension  Hypertension well controlled -blood pressure is in the range of 050 30 systolic  Toprol succinate 50 mg once a day    Hyperlipidemia  Remains on rosuvastatin  Status post left shoulder total replacement see did home exercise he does not think she needs to go for the how occupational therapy    Chronic kidney disease :Recent creat 1 7, lytes fair        Anemia:  Improved  Most recent hemoglobin is 10 9  At the time of hospital it was 9 9 and 10 6  Previous hemoglobin has been in the range of 10 and 12  No obvious bleeding    Follow-up iron studies, iron 52, iron sat 20% ferritin 236, TIBC 261  B12, folate adequate  Prior immunofixation without any evidence of monoclonal gammopathy    Lower leg edema:  Mild to moderate  Recent chronic respiratory failure with CO2 retention:  Patient does have a hypoventilation syndrome  Good functional capacity uses CPAP during daytime 2-3 hours    07/20/2022:  Creatinine 1 69 BUN 61 calcium 9 0 LFT normal GFR 30 hemoglobin A1c 9 0 cholesterol 141 LDL 31 HDL 35 hemoglobin 11 8 rest CBC normal, urinalysis unremarkable, PTH 80 5 vitamin-D 23 9 phosphorus 4 6 magnesium 1 9 urine for protein/creatinine ratio 0 19    06/16/2022:  GFR 32 creatinine 1 62 BUN 77 hemoglobin 10 5 rest of the CBC normal  05/13/2022:  Creatinine 1 49 BUN 36 rest of the LFT unremarkable, INR 1 18, blood sugar 282 hemoglobin 9 2  06/12/2022: BMP normal except BUN 97 and creatinine 1 93, CO2 35  06/11/2022: BMP normal except BUN 98 creatinine 1 944, CO2 36, hemoglobin 10 2 and platelet 990  93/26/1217:  BUN 91 creatinine 2 06 CO2 35    07/08/2022:  CT scan of the chest:   Nodules as described above    There is interval enlargement of one of the groundglass nodules at the right lower lobe as well as interval development of several groundglass nodules  These are likely of infectious or inflammatory etiology  Recommend   follow-up in 3 months to document stability or interval change      03/29/2022:  CT scan of chest abdomen and pelvis:  Decreased bilateral effusion, pulmonary edema as described, passive atelectasis in the lower lobes, multiple lung nodule as described follow-up CT scan in 3 months recommended, small fat containing periumbilical hernia, degenerative changes in the thoracolumbar area, mild level scoliosis,    05/16/2022:  CT scan of the brain no acute intracranial abnormality new left mastoid effusion       05/27/2022:  Pulmonary function test:FEV1/FVC Ratio: 73 % Forced Vital Capacity: 0 99 L 36 % predicted FEV1: 0 72 L 34 % predicted Interpretation: · Spirometry suggests abnormal lung volumes  Recommend checking lung volumes for further evaluation  · No significant response to the administration to bronchodilator per ATS standards · Flow volume loop is restricted  06/05/2022:  Chest x-ray no acute cardiopulmonary disease        Procedure: XR chest 1 view portable    Result Date: 10/31/2022  Narrative: CHEST INDICATION:   sob  COMPARISON:  June 4, 2022 EXAM PERFORMED/VIEWS:  XR CHEST PORTABLE Images: 2 FINDINGS:  Lungs adequately aerated  Cardiomediastinal silhouette appears unremarkable  Atherosclerotic aorta  The lungs are clear  No pneumothorax or pleural effusion  Osseous structures appear within normal limits for patient age  Left shoulder arthroplasty in stable position  Thoracic spine degenerative changes  Impression: No acute cardiopulmonary disease  Workstation performed: [id number]     Procedure: CT chest wo contrast     12/2/2022: CT CHEST WITHOUT IV CONTRAST INDICATION:     Within those limitations there appears to be 2 new sub-6 mm groundglass nodules in the right upper lobe  Additional bilateral pulmonary nodules are stable    Previously seen groundglass nodules in the right lower lobe which were new on prior examination appear to have resolved  Previously reported enlarging nodule in the right lower lobe measuring 9 mm appears stable   Recommend 3 month CT follow-up  Additional findings as above  This study demonstrates a significant  finding and was documented as such in [facility] for liaison and referring practitioner notification  Sodium                    Value: 135(mmol/L)        Dt: 11/29/2022  Potassium                 Value: 4 7(mmol/L)        Dt: 11/29/2022  Chloride                  Value: 97(mmol/L)         Dt: 11/29/2022  CO2                       Value: 35(mmol/L) (H)     Dt: 11/29/2022  ANION GAP                 Value:  3(mmol/L) (L)      Dt: 11/29/2022  BUN                       Value: 48(mg/dL) (H)      Dt: 11/29/2022  Creatinine                Value: 1 70(mg/dL) (H)    Dt: 11/29/2022  Glucose                   Value: 342(mg/dL) (H)     Dt: 11/29/2022  Calcium                   Value: 9 2(mg/dL)         Dt: 11/29/2022  Corrected Calcium         Value: 9 9(mg/dL)         Dt: 11/29/2022  AST                       Value: 9(U/L)             Dt: 11/29/2022  ALT                       Value: 20(U/L)            Dt: 11/29/2022  Alkaline Phosphatase      Value: 92(U/L)            Dt: 11/29/2022  Total Protein             Value: 7 3(g/dL)          Dt: 11/29/2022  Albumin                   Value: 3 1(g/dL) (L)      Dt: 11/29/2022  Total Bilirubin           Value: 0 37(mg/dL)        Dt: 11/29/2022  eGFR                      Value: 30(ml/min/1 73sq m) Dt: 11/29/2022  Hemoglobin A1C            Value: 11 5(%) (H)        Dt: 11/29/2022  EAG                       Value: 283(mg/dl)         Dt: 11/29/2022  WBC                       Value: 9 46(Thousand/uL)  Dt: 11/29/2022  RBC                       Value: 3 77(Million/uL) (L) Dt: 11/29/2022  Hemoglobin                Value: 10 9(g/dL) (L)     Dt: 11/29/2022  Hematocrit                Value: 35 6(%)            Dt: 11/29/2022  MCV Value: 94(fL)             Dt: 11/29/2022  MCH                       Value: 28 9(pg)           Dt: 11/29/2022  MCHC                      Value: 30 6(g/dL) (L)     Dt: 11/29/2022  RDW                       Value: 14 4(%)            Dt: 11/29/2022  Platelets                 Value: 210(Thousands/uL)  Dt: 11/29/2022  MPV                       Value: 11 4(fL)           Dt: 11/29/2022  ------------ - 2 weeks      Review of Systems   Constitutional: Negative for chills, fatigue, fever and unexpected weight change  HENT: Negative for ear pain, postnasal drip, sinus pain and sore throat  Eyes: Negative for pain and redness  Respiratory: Negative for cough and shortness of breath  Cardiovascular: Negative for chest pain, palpitations and leg swelling  Gastrointestinal: Negative for abdominal pain, diarrhea and nausea  Endocrine: Negative for cold intolerance, polydipsia and polyuria  Genitourinary: Negative for dysuria, frequency and urgency  Musculoskeletal: Positive for gait problem  Negative for arthralgias and myalgias  Skin: Negative for rash  Allergic/Immunologic: Negative  Neurological: Negative for dizziness, weakness and headaches  Hematological: Negative for adenopathy  Psychiatric/Behavioral: Negative for behavioral problems         Past Medical History:   Diagnosis Date   • Anemia    • Asthma    • COVID-19 January 2022   • GERD (gastroesophageal reflux disease)    • Hyperlipidemia    • PONV (postoperative nausea and vomiting)    • SVT (supraventricular tachycardia) (HCC)      Past Surgical History:   Procedure Laterality Date   • APPENDECTOMY     • CYSTOSCOPY W/ LASER LITHOTRIPSY Left 7/12/2016    Procedure: CYSTOSCOPY URETEROSCOPY WITH LITHOTRIPSY HOLMIUM LASER, RETROGRADE PYELOGRAM AND INSERTION STENT URETERAL;  Surgeon: Lorri English MD;  Location: [address] [place] [address];  Service:    • DILATION AND CURETTAGE OF UTERUS     • IR THORACENTESIS  3/18/2022   • JOINT REPLACEMENT right knee   • KNEE ARTHROPLASTY Right    • GA CYSTOURETHROSCOPY,URETER CATHETER Left 2016    Procedure: CYSTOSCOPY RETROGRADE PYELOGRAM WITH INSERTION STENT URETERAL, left;  Surgeon: Clinton Padron MD;  Location: [facility];  Service: Urology   • GA RECONSTR TOTAL SHOULDER IMPLANT Left 3/1/2022    Procedure: ARTHROPLASTY SHOULDER REVERSE;  Surgeon: Nilda Alvarez MD;  Location: [facility];  Service: Orthopedics   • SHOULDER ARTHROTOMY Left      Family History   Problem Relation Age of Onset   • Heart disease Mother    • Emphysema Maternal Grandmother    • Heart disease Family      Social History     Socioeconomic History   • Marital status: Single     Spouse name: None   • Number of children: None   • Years of education: None   • Highest education level: None   Occupational History   • None   Tobacco Use   • Smoking status: Former     Packs/day:      Years:      Pack years:      Types: Cigarettes     Quit date: 1996     Years since quittin 4   • Smokeless tobacco: Never   Vaping Use   • Vaping Use: Never used   Substance and Sexual Activity   • Alcohol use: Not Currently     Comment: rarely   • Drug use: No   • Sexual activity: Not Currently   Other Topics Concern   • None   Social History Narrative   • None     Social Determinants of Health     Financial Resource Strain: Not on file   Food Insecurity: No Food Insecurity   • Worried About Running Out of Food in the Last Year: Never true   • Ran Out of Food in the Last Year: Never true   Transportation Needs: No Transportation Needs   • Lack of Transportation (Medical): No   • Lack of Transportation (Non-Medical):  No   Physical Activity: Not on file   Stress: Not on file   Social Connections: Not on file   Intimate Partner Violence: Not on file   Housing Stability: Low Risk    • Unable to Pay for Housing in the Last Year: No   • Number of Places Lived in the Last Year: 2   • Unstable Housing in the Last Year: No     Current Outpatient Medications on File Prior to Visit   Medication Sig   • acetaminophen (TYLENOL) 325 mg tablet Take 650 mg by mouth every 6 (six) hours as needed for mild pain     • albuterol (PROVENTIL HFA,VENTOLIN HFA) 90 mcg/act inhaler Inhale 2 puffs every 6 (six) hours as needed for wheezing   • ammonium lactate (LAC-HYDRIN) 12 % lotion APPLY TOPICALLY TO AFFECTED AREAS twice a day   • apixaban (ELIQUIS) 5 mg Take 1 tablet (5 mg total) by mouth in the morning and 1 tablet (5 mg total) in the evening  • b complex-vitamin C-folic acid (NEPHROCAPS) 1 mg capsule Take 1 capsule by mouth daily   • docusate sodium (COLACE) 100 mg capsule Take 100 mg by mouth in the morning   • fluticasone (FLONASE) 50 mcg/act nasal spray 1 spray into each nostril daily   • glucose blood test strip Use 1 each 2 (two) times a day Use one 2 times daily   • insulin lispro (HumaLOG KwikPen) 100 units/mL injection pen 3 times with meal according to sliding scale - >Blood Glucose 150 - 199: 2 Unit of Insulin, Blood Glucose 200 - 249: 4 Units of Insulin, Blood Glucose 250 - 299: 6 Units of Insulin, Blood Glucose 300 - 349: 8 Units of Insulin, Blood Glucose 350 - 399: 10 Units of [place],Blood Glucose greater than or equal to 400: 12 Units of Insulin-please contact your physician   • Insulin Pen Needle (BD Pen Needle Pilar 2nd Gen) 32G X 4 MM MISC For use with insulin pen  Pharmacy may dispense brand covered by insurance     • Insulin Pen Needle (NovoFine Autocover) 30G X 8 MM MISC Inject under the skin daily   • Insulin Pen Needle 30G X 8 MM MISC 2 (two) times a day   • Lancets (onetouch ultrasoft) lancets Use once daily   • metoprolol succinate (TOPROL-XL) 50 mg 24 hr tablet Take 1 tablet (50 mg total) by mouth daily   • montelukast (SINGULAIR) 10 mg tablet Take 10 mg by mouth daily     • pregabalin (LYRICA) 100 mg capsule Take 1 capsule (100 mg total) by mouth 2 (two) times a day   • rosuvastatin (CRESTOR) 10 MG tablet Take 1 tablet (10 mg total) by mouth daily   • torsemide (DEMADEX) 20 mg tablet Take 2 tablets in the AM   • Toujeo SoloStar 300 units/mL CONCENTRATED U-300 injection pen (1-unit dial) Inject 35 Units under the skin daily at bedtime     Allergies   Allergen Reactions   • Penicillins Hives   • Moxifloxacin Other (See Comments)     unknown   • Percocet [Oxycodone-Acetaminophen] Other (See Comments)     unknown   • Zinc Acetate Other (See Comments)     unknown   • Asa [Aspirin] GI Intolerance   • Indocin [Indomethacin] Other (See Comments)     Made patient "loopy"   • Other Other (See Comments)     unknown     Immunization History   Administered Date(s) Administered   • COVID-19 MODERNA VACC 0 5 ML IM 02/11/2022       Objective     /78   Pulse 68   Temp 97 7 °F (36 5 °C)   Ht 5' 2" (1 575 m)   Wt 115 kg (254 lb)   SpO2 100%   BMI 46 46 kg/m²     Physical Exam  Vitals reviewed  Exam conducted with a chaperone present  Constitutional:       General: She is not in acute distress  Appearance: She is well-developed  She is obese  She is not ill-appearing, toxic-appearing or diaphoretic  HENT:      Head: Normocephalic and atraumatic  Right Ear: External ear normal       Left Ear: External ear normal       Nose: No congestion or rhinorrhea  Eyes:      General: Lids are normal          Right eye: No discharge  Left eye: No discharge  Conjunctiva/sclera: Conjunctivae normal    Neck:      Thyroid: No thyroid mass, thyromegaly or thyroid tenderness  Vascular: No carotid bruit or JVD  Trachea: Trachea normal    Cardiovascular:      Rate and Rhythm: Rhythm irregular  Heart sounds: Normal heart sounds  No murmur heard  Pulmonary:      Effort: No respiratory distress  Breath sounds: Normal breath sounds  No wheezing, rhonchi or rales  Abdominal:      General: Bowel sounds are normal  There is no distension  Palpations: There is no mass  Tenderness: There is no abdominal tenderness   There is no rebound  Musculoskeletal:      Right lower le+ Pitting Edema present  Left lower le+ Pitting Edema present  Lymphadenopathy:      Cervical: No cervical adenopathy  Skin:     General: Skin is warm  Coloration: Skin is not jaundiced or pale  Findings: No rash  Comments: On the right buttock area on inner side 1 x 1 cm irregular stage 2 and on the left side rectangle 1 x 2 cm on the left buttock area stage2  no cellulitis  No tunneling  No discharge  No foul smell  Blister on the heel, intact, no discharge around 2 inches   Neurological:      Mental Status: She is alert  Coordination: Coordination normal       Gait: Gait abnormal    Psychiatric:         Mood and Affect: Mood normal          Behavior: Behavior normal          Thought Content:  Thought content normal          Judgment: Judgment normal        Eladio Alexander MD

## 2022-12-06 NOTE — ASSESSMENT & PLAN NOTE
1 week of right heel unstageable blister with the intact skin    Will refer to the wound care center for further management  Recommend to keep the weight off

## 2022-12-06 NOTE — PATIENT INSTRUCTIONS
1   Add Toujeo 10 units daily in the morning  And continue 35 units at nighttime  #2 continue Humalog 6 units with the breakfast and 6 unit with the dinner  3   Check your sugar at 8 AM, 11 AM, 4 PM and 8 PM   Please keep the diary at least check sugar 3 times a day at different times of the day  Drop may result in next 2 weeks  4   Set up an appointment with endocrinologist given your complexity  5   Go for the lipid profile and CMP in 2 weeks  6   Bring the bottle for the sacral skin black for me to review so we can help you  7   Continue to follow with your cardiologist      #8 you will need a follow-up CT scan of the chest in 3 months I am making arrangement  9   Set up an appointment with your pulmonologist regarding your lung nodules  10   Set up regular appointment with your nephrologist at your convenience  Follow with Consultants as per their and our suggestion    Follow up in 4  week(s) or as needed earlier    Follow all instructions as advised and discussed  Take your medications as prescribed  Call the office immediately if you experience any side effects  Ask questions if you do not understand  Keep your scheduled appointment as advised or come sooner if necessary or in doubt  Best time to call for non-urgent matter or questions on weekdays is between 9am and 12 noon  See physician for any new symptoms or worsening of current symptoms  Urgent or emergent situations call 911 and report to nearest emergency room  I spent  45 minutes taking care of this patient including clinical care, conseling, collaboration, chart, lab and consultaion review  Patient is to get labs 2 week(s)       Recommend frequent repositioning, have family check buttock area if there is any worsening you will need to let us know and consider going to the wound care center      Continue applying barrier cream twice a day

## 2022-12-06 NOTE — ASSESSMENT & PLAN NOTE
Lab Results   Component Value Date    HGBA1C 11 5 (H) 11/29/2022     We talked about taking Humalog on regular basis to cover sugar responding to that particular meal on regular basis  I recommend that she check sugars between 7 and 8 AM, 11 and 11:30 AM, 3 to 4 pm and between 8 and 9 PM     's is definitely eat breakfast and regular timely manner as well as eat dinner in timely manner ,  She should consider eating some lunch on a regular basis  She generally speaking between 4 and 8 units of Humalog with breakfast, generally speaking she takes between 4 and 6 units of Humalog with dinner  We talked about we need a multiple different numbers to home use the right regiment  Recommend to keep track of at least 3 out of 4 times sugar and bring with her in the next visit  Meanwhile I would recommend #1 increase Toujeo 10 units daily morning and 35 units to be continued at nighttime  Meanwhile recommend to take her Humalog 6 units with breakfast and 6 units with the dinner  Bring me sugar report in next in next 2 weeks  Drop me of the report      Also strongly recommend to see endocrinologist

## 2022-12-08 DIAGNOSIS — E11.00 TYPE 2 DIABETES MELLITUS WITH HYPEROSMOLARITY WITHOUT COMA, WITHOUT LONG-TERM CURRENT USE OF INSULIN (HCC): Primary | ICD-10-CM

## 2022-12-08 NOTE — TELEPHONE ENCOUNTER
Pt is unable to get pen needles at [facility]  I called them, and every one of their locations is out  Also called 2 other pharmacies that are also out  [facility] has a slightly larger needle in stock (31g)

## 2022-12-12 PROBLEM — S90.829A: Status: ACTIVE | Noted: 2022-12-12

## 2022-12-12 NOTE — ASSESSMENT & PLAN NOTE
Lab Results   Component Value Date    WBC 9 46 11/29/2022    HGB 10 9 (L) 11/29/2022    HCT 35 6 11/29/2022    MCV 94 11/29/2022     11/29/2022   baseline

## 2022-12-12 NOTE — ASSESSMENT & PLAN NOTE
Lab Results   Component Value Date    LDLCALC 31 07/20/2022     Lab Results   Component Value Date    ALT 20 11/29/2022     Lab Results   Component Value Date    CHOLESTEROL 141 07/20/2022    CHOLESTEROL 104 03/10/2022     Lab Results   Component Value Date    HDL 35 (L) 07/20/2022    HDL 30 (L) 03/10/2022     Lab Results   Component Value Date    TRIG 376 (H) 07/20/2022    TRIG 132 03/10/2022     Lab Results   Component Value Date    [place] 106 07/20/2022     Will continue  crestor 10 mg daily

## 2022-12-12 NOTE — ASSESSMENT & PLAN NOTE
Will refer to the wound care center for your heel blister please also have them check it  Frequent repositioning  Have family check it out every day for any worsening      New    For one week    Intact skin

## 2022-12-12 NOTE — ASSESSMENT & PLAN NOTE
Pulmonary: Obstructive sleep apnea: Under care of pulmonologist on BiPAP 12/6,  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:  Continue BiPAP

## 2022-12-12 NOTE — ASSESSMENT & PLAN NOTE
Lab Results   Component Value Date    EGFR 30 11/29/2022    EGFR 45 11/03/2022    EGFR 39 11/02/2022    CREATININE 1 70 (H) 11/29/2022    CREATININE 1 21 11/03/2022    CREATININE 1 35 (H) 11/02/2022   GFR is baseline  Creat is baseline  Recommend periodic blood test for monitoring of BUN and Creat  Avoid Non Steroidal Antiinflammatory such as ibuprofen, aleve etc   Potassium, HCO3 and calcium are wnl and will require periodic blood test   Bone and Mineral disease monitoring as appropriate    All patient with GFR less than 30( CKD 4 or 5 or 6) advised to follow with nephrologist

## 2022-12-12 NOTE — ASSESSMENT & PLAN NOTE
Wt Readings from Last 3 Encounters:   12/06/22 115 kg (254 lb)   11/08/22 112 kg (248 lb)   10/31/22 112 kg (246 lb)         Compensated  3- echo and nuclear stress test 8- reviewe    Continue DM control, Metoprolol, rosuvastatin, torsemide, eliquis    Pt will continue to follow with cardiologist

## 2022-12-20 ENCOUNTER — OFFICE VISIT (OUTPATIENT)
Dept: WOUND CARE | Facility: HOSPITAL | Age: 72
End: 2022-12-20

## 2022-12-20 ENCOUNTER — APPOINTMENT (OUTPATIENT)
Dept: LAB | Facility: CLINIC | Age: 72
End: 2022-12-20

## 2022-12-20 VITALS
HEIGHT: 62 IN | DIASTOLIC BLOOD PRESSURE: 68 MMHG | TEMPERATURE: 97.8 F | SYSTOLIC BLOOD PRESSURE: 126 MMHG | BODY MASS INDEX: 46.74 KG/M2 | WEIGHT: 254 LBS | RESPIRATION RATE: 15 BRPM | HEART RATE: 68 BPM

## 2022-12-20 DIAGNOSIS — L89.312 PRESSURE ULCER OF RIGHT BUTTOCK, STAGE 2 (HCC): ICD-10-CM

## 2022-12-20 DIAGNOSIS — E11.65 TYPE 2 DIABETES MELLITUS WITH HYPERGLYCEMIA, WITH LONG-TERM CURRENT USE OF INSULIN (HCC): ICD-10-CM

## 2022-12-20 DIAGNOSIS — E11.621 TYPE 2 DIABETES MELLITUS WITH FOOT ULCER (CODE) (HCC): ICD-10-CM

## 2022-12-20 DIAGNOSIS — L97.418 NON-PRESSURE CHRONIC ULCER OF RIGHT HEEL AND MIDFOOT WITH OTHER SPECIFIED SEVERITY (HCC): ICD-10-CM

## 2022-12-20 DIAGNOSIS — Z79.4 TYPE 2 DIABETES MELLITUS WITH HYPERGLYCEMIA, WITH LONG-TERM CURRENT USE OF INSULIN (HCC): ICD-10-CM

## 2022-12-20 DIAGNOSIS — I10 ESSENTIAL HYPERTENSION: ICD-10-CM

## 2022-12-20 DIAGNOSIS — E11.00 TYPE 2 DIABETES MELLITUS WITH HYPEROSMOLARITY WITHOUT COMA, WITHOUT LONG-TERM CURRENT USE OF INSULIN (HCC): ICD-10-CM

## 2022-12-20 DIAGNOSIS — L89.322 PRESSURE ULCER OF LEFT BUTTOCK, STAGE 2 (HCC): Primary | ICD-10-CM

## 2022-12-20 DIAGNOSIS — E66.01 MORBID OBESITY WITH BMI OF 45.0-49.9, ADULT (HCC): Chronic | ICD-10-CM

## 2022-12-20 DIAGNOSIS — E78.2 MIXED HYPERLIPIDEMIA: ICD-10-CM

## 2022-12-20 LAB
ALBUMIN SERPL BCP-MCNC: 3.2 G/DL (ref 3.5–5)
ALP SERPL-CCNC: 75 U/L (ref 46–116)
ALT SERPL W P-5'-P-CCNC: 21 U/L (ref 12–78)
ANION GAP SERPL CALCULATED.3IONS-SCNC: 4 MMOL/L (ref 4–13)
AST SERPL W P-5'-P-CCNC: 12 U/L (ref 5–45)
BILIRUB SERPL-MCNC: 0.5 MG/DL (ref 0.2–1)
BUN SERPL-MCNC: 39 MG/DL (ref 5–25)
CALCIUM ALBUM COR SERPL-MCNC: 9.6 MG/DL (ref 8.3–10.1)
CALCIUM SERPL-MCNC: 9 MG/DL (ref 8.3–10.1)
CHLORIDE SERPL-SCNC: 100 MMOL/L (ref 96–108)
CHOLEST SERPL-MCNC: 155 MG/DL
CO2 SERPL-SCNC: 34 MMOL/L (ref 21–32)
CREAT SERPL-MCNC: 1.61 MG/DL (ref 0.6–1.3)
GFR SERPL CREATININE-BSD FRML MDRD: 32 ML/MIN/1.73SQ M
GLUCOSE P FAST SERPL-MCNC: 274 MG/DL (ref 65–99)
HDLC SERPL-MCNC: 42 MG/DL
LDLC SERPL CALC-MCNC: 61 MG/DL (ref 0–100)
NONHDLC SERPL-MCNC: 113 MG/DL
POTASSIUM SERPL-SCNC: 4.7 MMOL/L (ref 3.5–5.3)
PROT SERPL-MCNC: 7.3 G/DL (ref 6.4–8.4)
SODIUM SERPL-SCNC: 138 MMOL/L (ref 135–147)
TRIGL SERPL-MCNC: 259 MG/DL

## 2022-12-20 NOTE — PATIENT INSTRUCTIONS
Orders Placed This Encounter   Procedures    Wound cleansing and dressings     Right and Left Buttock:    Wash your hands with soap and water  Remove old dressing, discard into plastic bag and place in trash  Cleanse the wound with soap and water prior to applying a clean dressing  Do not use tissue or cotton balls  Do not scrub the wound  Pat dry using gauze  Shower yes     Apply Triad cream paste to the buttock wound daily  This was done today  Standing Status:   Future     Standing Expiration Date:   12/20/2023    Wound cleansing and dressings     Right Heel Blister:    Wash your hands with soap and water  Remove old dressing, discard into plastic bag and place in trash  Cleanse the wound with soap and water prior to applying a clean dressing  Do not use tissue or cotton balls  Do not scrub the wound  Pat dry using gauze  Shower yes   Apply moisturizer to skin surrounding wound  Apply skin prep to the heel wound daily  Put wedge that you have from the hospital under leg to elevate heel off the bed  Standing Status:   Future     Standing Expiration Date:   12/20/2023    Wound off loading     Off-loading Instructions:    Keep weight and pressure off wound at all times  , Use your foam cushion to sit as you have been  (Do not use donut-type devices)  Seat lifts or shift position in chair every 15 minutes  , Turn every 2 hours  Avoid position directing pressure to Wound site  Limit side lying to 30 degree tilt  Limit the head of bed elevation to 30 degrees  and Do Not Sit for Long Periods of Time  Use your hospital wedge to elevate right heel off of bed  Standing Status:   Future     Standing Expiration Date:   12/20/2023    Wound miscellaneous orders     Continue to eat increased protein three times per day       Standing Status:   Future     Standing Expiration Date:   12/20/2023

## 2022-12-20 NOTE — PROGRESS NOTES
Patient ID: Anel Brock is a 81 y o  female Date of Birth 1950     Chief Complaint  Chief Complaint   Patient presents with   • New Patient Visit     Right foot and buttock       Allergies  Penicillins, Moxifloxacin, Percocet [oxycodone-acetaminophen], Zinc acetate, Asa [aspirin], Indocin [indomethacin], and Other    Assessment:     Diagnoses and all orders for this visit:    Pressure ulcer of left buttock, stage 2 ([facility])  -     Wound cleansing and dressings; Future  -     Wound cleansing and dressings; Future  -     Wound off loading; Future  -     Wound miscellaneous orders; Future    Pressure ulcer of right buttock, stage 2 ([facility])    Type 2 diabetes mellitus with foot ulcer (CODE) ([facility])  -     Wound cleansing and dressings; Future  -     Wound cleansing and dressings; Future  -     Wound off loading; Future  -     Wound miscellaneous orders; Future    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 [address] )  -     Wound cleansing and dressings; Future  -     Wound cleansing and dressings; Future  -     Wound off loading; Future  -     Wound miscellaneous orders; Future    Morbid obesity with BMI of 45 0-49 9, adult ([facility])  -     Wound cleansing and dressings; Future  -     Wound cleansing and dressings; Future  -     Wound off loading; Future  -     Wound miscellaneous orders; Future    Type 2 diabetes mellitus with hyperglycemia, with long-term current use of insulin ([facility])  -     Wound cleansing and dressings; Future  -     Wound cleansing and dressings; Future  -     Wound off loading; Future  -     Wound miscellaneous orders; Future          Plan:  1  Initial visit  Wounds cleansed with normal saline and gauze  Patient has stage II pressure ulcers of her bilateral buttocks not showing any signs symptoms of infection  Patient has a Singh grade 1 diabetic foot ulcer of her right heel not showing any signs or symptoms of infection    2   We will have Triad paste applied to bilateral buttocks twice daily and as needed  3   We will have Skin-Prep applied to right heel and counseled patient to offload pressure from right heel at all times  4  Counseled patient on the importance of offloading pressure from her buttocks and her right heel to aid in wound healing  Recommended patient purchase Prevalon boots or use folded pillows to float heels off the surface of the bed  Patient is to also use green foam wedges to help offload pressure from her buttocks while laying in bed  Patient is to also use her wheelchair cushion when out of bed in chair  5   A1C results reviewed with the patient today     6   Patient will follow-up in 2 weeks     Wound 12/20/22 Pressure Injury Heel Right (Active)   Wound Image Images linked 12/20/22 1043   Wound Description Other (Comment) (intact blister) 12/20/22 1043   Pressure Injury Stage 1 12/20/22 1043   Marlene-wound Assessment Dry;Scaly 12/20/22 1043   Wound Length (cm) 4 cm 12/20/22 1043   Wound Width (cm) 4 cm 12/20/22 1043   Wound Depth (cm) 0 cm 12/20/22 1043   Wound Surface Area (cm^2) 16 cm^2 12/20/22 1043   Wound Volume (cm^3) 0 cm^3 12/20/22 1043   Calculated Wound Volume (cm^3) 0 cm^3 12/20/22 1043   Drainage Amount None 12/20/22 1043   Non-staged Wound Description Full thickness 12/20/22 1043   Dressing Status Intact (upon arrival) 12/20/22 1043       Wound 12/20/22 Pressure Injury Buttocks Left (Active)   Wound Image Images linked 12/20/22 1051   Wound Description Beefy red;Granulation tissue 12/20/22 1050   Pressure Injury Stage 2 12/20/22 1050   Marlene-wound Assessment Erythema 12/20/22 1050   Wound Length (cm) 3 cm (scattered pinpoint open areas) 12/20/22 1050   Wound Width (cm) 3 cm 12/20/22 1050   Wound Depth (cm) 0 1 cm 12/20/22 1050   Wound Surface Area (cm^2) 9 cm^2 12/20/22 1050   Wound Volume (cm^3) 0 9 cm^3 12/20/22 1050   Calculated Wound Volume (cm^3) 0 9 cm^3 12/20/22 1050   Drainage Amount Scant 12/20/22 1050   Drainage Description Clear 12/20/22 1050   Non-staged Wound Description Full thickness 12/20/22 1050   Dressing Status Other (Comment) (no dressing upon arrival) 12/20/22 1050       Wound 12/20/22 Buttocks Right (Active)   Wound Image Images linked 12/20/22 1050   Wound Description Beefy red;Granulation tissue 12/20/22 1050   Pressure Injury Stage 2 12/20/22 1050   Marlene-wound Assessment Erythema 12/20/22 1050   Wound Length (cm) 4 cm (few scattered pinpoint areas) 12/20/22 1050   Wound Width (cm) 4 cm 12/20/22 1050   Wound Depth (cm) 0 1 cm 12/20/22 1050   Wound Surface Area (cm^2) 16 cm^2 12/20/22 1050   Wound Volume (cm^3) 1 6 cm^3 12/20/22 1050   Calculated Wound Volume (cm^3) 1 6 cm^3 12/20/22 1050   Drainage Amount Scant 12/20/22 1050   Drainage Description Clear 12/20/22 1050   Non-staged Wound Description Full thickness 12/20/22 1050   Dressing Status Other (Comment) (no dressing upon arrival) 12/20/22 1050       Wound 12/20/22 Pressure Injury Heel Right (Active)   Date First Assessed/Time First Assessed: 12/20/22 1042   Primary Wound Type: Pressure Injury  Location: Heel  Wound Location Orientation: Right  Wound Description (Comments): Stage 1       Wound 12/20/22 Pressure Injury Buttocks Left (Active)   Date First Assessed/Time First Assessed: 12/20/22 1048   Primary Wound Type: Pressure Injury  Location: Buttocks  Wound Location Orientation: Left  Wound Description (Comments): stage 2       Wound 12/20/22 Buttocks Right (Active)   Date First Assessed/Time First Assessed: 12/20/22 1048   Location: Buttocks  Wound Location Orientation: Right  Wound Description (Comments): stage 2       [REMOVED] Wound 06/10/22 Cellulitis Arm Right (Removed)   Resolved Date: 12/20/22  Date First Assessed/Time First Assessed: 06/10/22 1257   Pre-Existing Wound: No  Traumatic Wound Type: Cellulitis  Location: Arm  Wound Location Orientation: Right  Dressing Status: Open to air  Wound Outcome: (c) Other (Comment)       [REMOVED] Wound 11/02/22 Pressure Injury Buttocks Right;Left (Removed)   Resolved Date: 12/20/22  Date First Assessed/Time First Assessed: 11/02/22 2100   Pre-Existing Wound: (c) Yes  Primary Wound Type: Pressure Injury  Location: Buttocks  Wound Location Orientation: Right;Left  Wound Description (Comments): stage 2 press    Subjective:     Patient is 26-year-old female who presents to the [facility] wound center as a new patient for stage II pressure ulcers of her bilateral buttocks and a grade 1 diabetic foot ulcer of her right heel  Patient reports her wounds developed as a result of being more sedentary after she had her left shoulder replaced in March and frequently laying on her back while in bed  Patient's niece who is present with her today reports patient previously had visiting nurses who were helping her with her wound care prior to being discharged her from their care  Visiting nurses were applying Triad paste to her bilateral buttocks which healed her wounds in the past   Patient reports she has a history of neuropathy and has decreased sensation in her feet  Patient denies any drainage from her right heel wound  She has been keeping her right heel wound open to air  Patient has a history of type 2 diabetes  Per patient's chart her most recent A1c from 11/29/22 was 11 5  Patient reports some pain in her right heel  She denies any fevers or chills  The following portions of the patient's history were reviewed and updated as appropriate:   She  has a past medical history of Anemia, Asthma, Chronic kidney disease, COPD (chronic obstructive pulmonary disease) ([address] ), COVID-19, Diabetes mellitus ([address] ), GERD (gastroesophageal reflux disease), Hyperlipidemia, Hypertension, PONV (postoperative nausea and vomiting), and SVT (supraventricular tachycardia) ([address] )    She   Patient Active Problem List    Diagnosis Date Noted   • Heel blister 12/12/2022   • Blister of right heel 12/06/2022   • Impaired mobility and ADLs 11/08/2022 • Pressure injury of sacral region, stage 2 ([place] [address] ) 11/08/2022   • COVID-19 10/31/2022   • Rash 08/19/2022   • PSVT (paroxysmal supraventricular tachycardia) ([place] [address] ) 07/17/2022   • Chronic respiratory failure with hypoxia and hypercapnia ([place] [address] ) 06/14/2022   • Platelets decreased ([place] [address] ) 06/14/2022   • Elevated serum creatinine 06/08/2022   • KATRINA (obstructive sleep apnea) 06/04/2022   • Obesity hypoventilation syndrome ([place] [address] ) 05/17/2022   • Morbid obesity ([place] [address] ) 04/22/2022   • Lump of left breast 04/08/2022   • Pressure injury of deep tissue of sacral region 04/08/2022   • Pulmonary nodules 03/30/2022   • Chronic diastolic heart failure ([place] [address] ) 03/29/2022   • Peripheral venous insufficiency 03/25/2022   • Post-herpetic polyneuropathy 03/25/2022   • Raynaud's disease 03/25/2022   • Lung nodule 03/25/2022   • Stage 3 chronic kidney disease ([place] [address] )    • Status post reverse total replacement of left shoulder 03/09/2022   • PONV (postoperative nausea and vomiting) 02/28/2022   • Diabetes mellitus, type 2 ([place] [address] ) 02/28/2022   • Gastroesophageal reflux disease 02/28/2022   • Nephrolithiasis 02/28/2022   • Arthritis 02/28/2022   • S/P total knee replacement, right 02/28/2022   • On continuous oral anticoagulation - eliquis 02/28/2022   • Essential hypertension 08/01/2019   • Anemia 07/31/2019   • Mixed hyperlipidemia 06/25/2019   • Paroxysmal atrial fibrillation ([place] [address] ) 05/28/2019   • Lower leg edema 05/27/2019   • Asthma 03/08/2018   • Morbid obesity with BMI of 45 0-49 9, adult ([place] [address] ) 03/08/2018     She  has a past surgical history that includes Joint replacement; Appendectomy; Knee Arthroplasty (Right); Shoulder arthrotomy (Left); Dilation and curettage of uterus; Cystoscopy w/ laser lithotripsy (Left, 7/12/2016); pr cysto bladder w/ureteral catheterization (Left, 6/29/2016); pr arthroplasty glenohumeral joint total shoulder (Left, 3/1/2022); and IR thoracentesis (3/18/2022)    Her family history includes Diabetes in her father; Emphysema in her maternal grandmother; Heart disease in her family and mother  She  reports that she quit smoking about 26 years ago  Her smoking use included cigarettes  She has a 20 00 pack-year smoking history  She has never used smokeless tobacco  She reports that she does not currently use alcohol  She reports that she does not use drugs  Current Outpatient Medications   Medication Sig Dispense Refill   • albuterol (PROVENTIL HFA,VENTOLIN HFA) 90 mcg/act inhaler Inhale 2 puffs every 6 (six) hours as needed for wheezing 8 g 4   • apixaban (ELIQUIS) 5 mg Take 1 tablet (5 mg total) by mouth in the morning and 1 tablet (5 mg total) in the evening   180 tablet 1   • b complex-vitamin C-folic acid (NEPHROCAPS) 1 mg capsule Take 1 capsule by mouth daily 30 capsule 5   • docusate sodium (COLACE) 100 mg capsule Take 100 mg by mouth in the morning     • fluticasone (FLONASE) 50 mcg/act nasal spray 1 spray into each nostril daily  0   • insulin lispro (HumaLOG KwikPen) 100 units/mL injection pen 3 times with meal according to sliding scale - >Blood Glucose 150 - 199: 2 Unit of Insulin, Blood Glucose 200 - 249: 4 Units of Insulin, Blood Glucose 250 - 299: 6 Units of Insulin, Blood Glucose 300 - 349: 8 Units of Insulin, Blood Glucose 350 - 399: 10 Units of [place],Blood Glucose greater than or equal to 400: 12 Units of Insulin-please contact your physician 15 mL 0   • metoprolol succinate (TOPROL-XL) 50 mg 24 hr tablet Take 1 tablet (50 mg total) by mouth daily 30 tablet 3   • montelukast (SINGULAIR) 10 mg tablet Take 10 mg by mouth daily       • pregabalin (LYRICA) 100 mg capsule Take 1 capsule (100 mg total) by mouth 2 (two) times a day 180 capsule 1   • rosuvastatin (CRESTOR) 10 MG tablet Take 1 tablet (10 mg total) by mouth daily 90 tablet 1   • torsemide (DEMADEX) 20 mg tablet Take 2 tablets in the AM 90 tablet 1   • Toujeo SoloStar 300 units/mL CONCENTRATED U-300 injection pen (1-unit dial) Inject 35 Units under the skin daily at bedtime 4 5 mL 3   • acetaminophen (TYLENOL) 325 mg tablet Take 650 mg by mouth every 6 (six) hours as needed for mild pain       • ammonium lactate (LAC-HYDRIN) 12 % lotion APPLY TOPICALLY TO AFFECTED AREAS twice a day     • glucose blood test strip Use 1 each 2 (two) times a day Use one 2 times daily 200 strip 5   • Insulin Pen Needle (BD Pen Needle Pilar 2nd Gen) 32G X 4 MM MISC For use with insulin pen  Pharmacy may dispense brand covered by insurance  100 each 0   • Insulin Pen Needle (NovoFine Autocover) 30G X 8 MM MISC Inject under the skin daily 100 each 3   • Insulin Pen Needle 30G X 8 MM MISC 2 (two) times a day     • Insulin Pen Needle 31G X 8 MM MISC Use daily Inject under the skin 100 each 2   • Lancets (onetouch ultrasoft) lancets Use once daily 100 each 2     No current facility-administered medications for this visit  She is allergic to penicillins, moxifloxacin, percocet [oxycodone-acetaminophen], zinc acetate, asa [aspirin], indocin [indomethacin], and other       Review of Systems   Constitutional: Negative  HENT: Negative for ear pain and hearing loss  Eyes: Negative for pain  Respiratory: Negative for chest tightness and shortness of breath  Cardiovascular: Negative for chest pain, palpitations and leg swelling  Gastrointestinal: Negative for diarrhea, nausea and vomiting  Genitourinary: Negative for dysuria  Musculoskeletal: Positive for gait problem  Skin: Positive for wound  Neurological: Positive for numbness  Negative for tremors and weakness  Psychiatric/Behavioral: Negative for behavioral problems, confusion and suicidal ideas           Objective:       Wound 12/20/22 Pressure Injury Heel Right (Active)   Wound Image Images linked 12/20/22 1043   Wound Description Other (Comment) (intact blister) 12/20/22 1043   Pressure Injury Stage 1 12/20/22 1043   Marlene-wound Assessment Dry;Scaly 12/20/22 1043   Wound Length (cm) 4 cm 12/20/22 1043   Wound Width (cm) 4 cm 12/20/22 1043   Wound Depth (cm) 0 cm 12/20/22 1043   Wound Surface Area (cm^2) 16 cm^2 12/20/22 1043   Wound Volume (cm^3) 0 cm^3 12/20/22 1043   Calculated Wound Volume (cm^3) 0 cm^3 12/20/22 1043   Drainage Amount None 12/20/22 1043   Non-staged Wound Description Full thickness 12/20/22 1043   Dressing Status Intact (upon arrival) 12/20/22 1043       Wound 12/20/22 Pressure Injury Buttocks Left (Active)   Wound Image Images linked 12/20/22 1051   Wound Description Beefy red;Granulation tissue 12/20/22 1050   Pressure Injury Stage 2 12/20/22 1050   Marlene-wound Assessment Erythema 12/20/22 1050   Wound Length (cm) 3 cm (scattered pinpoint open areas) 12/20/22 1050   Wound Width (cm) 3 cm 12/20/22 1050   Wound Depth (cm) 0 1 cm 12/20/22 1050   Wound Surface Area (cm^2) 9 cm^2 12/20/22 1050   Wound Volume (cm^3) 0 9 cm^3 12/20/22 1050   Calculated Wound Volume (cm^3) 0 9 cm^3 12/20/22 1050   Drainage Amount Scant 12/20/22 1050   Drainage Description Clear 12/20/22 1050   Non-staged Wound Description Full thickness 12/20/22 1050   Dressing Status Other (Comment) (no dressing upon arrival) 12/20/22 1050       Wound 12/20/22 Buttocks Right (Active)   Wound Image Images linked 12/20/22 1050   Wound Description Beefy red;Granulation tissue 12/20/22 1050   Pressure Injury Stage 2 12/20/22 1050   Marlene-wound Assessment Erythema 12/20/22 1050   Wound Length (cm) 4 cm (few scattered pinpoint areas) 12/20/22 1050   Wound Width (cm) 4 cm 12/20/22 1050   Wound Depth (cm) 0 1 cm 12/20/22 1050   Wound Surface Area (cm^2) 16 cm^2 12/20/22 1050   Wound Volume (cm^3) 1 6 cm^3 12/20/22 1050   Calculated Wound Volume (cm^3) 1 6 cm^3 12/20/22 1050   Drainage Amount Scant 12/20/22 1050   Drainage Description Clear 12/20/22 1050   Non-staged Wound Description Full thickness 12/20/22 1050   Dressing Status Other (Comment) (no dressing upon arrival) 12/20/22 1050       /68   Pulse 68   Temp 97 8 °F (36 6 °C)   Resp 15   Ht 5' 2" (1 575 m)   Wt 115 kg (254 lb)   BMI 46 46 kg/m²     Physical Exam  Vitals and nursing note reviewed  Constitutional:       General: She is not in acute distress  Appearance: Normal appearance  She is obese  HENT:      Head: Normocephalic and atraumatic  Eyes:      General:         Right eye: No discharge  Left eye: No discharge  Pulmonary:      Effort: Pulmonary effort is normal  No respiratory distress  Musculoskeletal:      Cervical back: Normal range of motion and neck supple  No rigidity  Right lower leg: Edema present  Left lower leg: Edema present  Comments: Mild bilateral lower extremity edema   Skin:     General: Skin is warm and dry  Findings: Wound present  No erythema  Neurological:      General: No focal deficit present  Mental Status: She is alert and oriented to person, place, and time  Mental status is at baseline  Psychiatric:         Mood and Affect: Mood normal          Behavior: Behavior normal          Thought Content: Thought content normal          Judgment: Judgment normal                            Wound Instructions:  Orders Placed This Encounter   Procedures   • Wound cleansing and dressings     Right and Left Buttock:    Wash your hands with soap and water  Remove old dressing, discard into plastic bag and place in trash  Cleanse the wound with soap and water prior to applying a clean dressing  Do not use tissue or cotton balls  Do not scrub the wound  Pat dry using gauze  Shower yes     Apply Triad cream paste to the buttock wound daily  This was done today  Standing Status:   Future     Standing Expiration Date:   12/20/2023   • Wound cleansing and dressings     Right Heel Blister:    Wash your hands with soap and water  Remove old dressing, discard into plastic bag and place in trash  Cleanse the wound with soap and water prior to applying a clean dressing  Do not use tissue or cotton balls   Do not scrub the wound  Pat dry using gauze  Shower yes   Apply moisturizer to skin surrounding wound  Apply skin prep to the heel wound daily  Put wedge that you have from the hospital under leg to elevate heel off the bed  Standing Status:   Future     Standing Expiration Date:   12/20/2023   • Wound off loading     Off-loading Instructions:    Keep weight and pressure off wound at all times  , Use your foam cushion to sit as you have been  (Do not use donut-type devices)  Seat lifts or shift position in chair every 15 minutes  , Turn every 2 hours  Avoid position directing pressure to Wound site  Limit side lying to 30 degree tilt  Limit the head of bed elevation to 30 degrees  and Do Not Sit for Long Periods of Time  Use your hospital wedge to elevate right heel off of bed  Standing Status:   Future     Standing Expiration Date:   12/20/2023   • Wound miscellaneous orders     Continue to eat increased protein three times per day  Standing Status:   Future     Standing Expiration Date:   12/20/2023        Diagnosis ICD-10-CM Associated Orders   1  Pressure ulcer of left buttock, stage 2 ([facility])  [id number] [id number] Wound cleansing and dressings     Wound cleansing and dressings     Wound off loading     Wound miscellaneous orders      2  Pressure ulcer of right buttock, stage 2 ([facility] [address] )  [id number]       3  Type 2 diabetes mellitus with foot ulcer (CODE) ([facility])  E11 621 Wound cleansing and dressings     Wound cleansing and dressings     Wound off loading     Wound miscellaneous orders      4  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 with other specified severity ([facility] [address] )  L97 418 Wound cleansing and dressings     Wound cleansing and dressings     Wound off loading     Wound miscellaneous orders      5  Morbid obesity with BMI of 45 0-49 9, adult ([facility])  E66 01 Wound cleansing and dressings    Z68 42 Wound cleansing and dressings     Wound off loading     Wound miscellaneous orders      6   Type 2 diabetes mellitus with hyperglycemia, with long-term current use of insulin ([facility])  E11 65 Wound cleansing and dressings    Z79 4 Wound cleansing and dressings     Wound off loading     Wound miscellaneous orders

## 2022-12-21 ENCOUNTER — RA CDI HCC (OUTPATIENT)
Dept: OTHER | Facility: HOSPITAL | Age: 72
End: 2022-12-21

## 2022-12-21 NOTE — PROGRESS NOTES
I13 0  [facility]  coding opportunities       Chart reviewed, no opportunity found: CHART REVIEWED, NO OPPORTUNITY FOUND        Patients Insurance     Medicare Insurance: Medicare

## 2022-12-24 NOTE — DISCHARGE INSTRUCTIONS
Monitor daily weight  Monitor blood glucose before meal and bedtime  Follow-up blood test to monitor kidney function in 1 week normal

## 2022-12-30 ENCOUNTER — OFFICE VISIT (OUTPATIENT)
Dept: INTERNAL MEDICINE CLINIC | Facility: CLINIC | Age: 72
End: 2022-12-30

## 2022-12-30 VITALS
BODY MASS INDEX: 47.11 KG/M2 | SYSTOLIC BLOOD PRESSURE: 116 MMHG | HEIGHT: 62 IN | HEART RATE: 73 BPM | DIASTOLIC BLOOD PRESSURE: 70 MMHG | OXYGEN SATURATION: 94 % | WEIGHT: 256 LBS

## 2022-12-30 DIAGNOSIS — J96.12 CHRONIC RESPIRATORY FAILURE WITH HYPOXIA AND HYPERCAPNIA (HCC): Chronic | ICD-10-CM

## 2022-12-30 DIAGNOSIS — Z78.9 IMPAIRED MOBILITY AND ADLS: ICD-10-CM

## 2022-12-30 DIAGNOSIS — Z74.09 IMPAIRED MOBILITY AND ADLS: ICD-10-CM

## 2022-12-30 DIAGNOSIS — D69.6 PLATELETS DECREASED (HCC): ICD-10-CM

## 2022-12-30 DIAGNOSIS — I48.0 PAROXYSMAL ATRIAL FIBRILLATION (HCC): ICD-10-CM

## 2022-12-30 DIAGNOSIS — J96.11 CHRONIC RESPIRATORY FAILURE WITH HYPOXIA AND HYPERCAPNIA (HCC): Chronic | ICD-10-CM

## 2022-12-30 DIAGNOSIS — N18.32 STAGE 3B CHRONIC KIDNEY DISEASE (HCC): ICD-10-CM

## 2022-12-30 DIAGNOSIS — R60.0 LOWER LEG EDEMA: ICD-10-CM

## 2022-12-30 DIAGNOSIS — I50.32 CHRONIC DIASTOLIC HEART FAILURE (HCC): ICD-10-CM

## 2022-12-30 DIAGNOSIS — Z79.01 ON CONTINUOUS ORAL ANTICOAGULATION: ICD-10-CM

## 2022-12-30 DIAGNOSIS — L89.152 PRESSURE INJURY OF SACRAL REGION, STAGE 2 (HCC): ICD-10-CM

## 2022-12-30 DIAGNOSIS — J44.9 CHRONIC OBSTRUCTIVE PULMONARY DISEASE, UNSPECIFIED COPD TYPE (HCC): Primary | ICD-10-CM

## 2022-12-30 DIAGNOSIS — S90.829A: ICD-10-CM

## 2022-12-30 DIAGNOSIS — R91.1 LUNG NODULE: ICD-10-CM

## 2022-12-30 DIAGNOSIS — I10 ESSENTIAL HYPERTENSION: ICD-10-CM

## 2022-12-30 DIAGNOSIS — E11.00 TYPE 2 DIABETES MELLITUS WITH HYPEROSMOLARITY WITHOUT COMA, WITHOUT LONG-TERM CURRENT USE OF INSULIN (HCC): ICD-10-CM

## 2022-12-30 DIAGNOSIS — J45.909 MILD ASTHMA WITHOUT COMPLICATION, UNSPECIFIED WHETHER PERSISTENT: ICD-10-CM

## 2022-12-30 DIAGNOSIS — N63.20 MASS OF LEFT BREAST, UNSPECIFIED QUADRANT: ICD-10-CM

## 2022-12-30 DIAGNOSIS — D64.9 ANEMIA, UNSPECIFIED TYPE: ICD-10-CM

## 2022-12-30 DIAGNOSIS — I47.1 PSVT (PAROXYSMAL SUPRAVENTRICULAR TACHYCARDIA) (HCC): ICD-10-CM

## 2022-12-30 DIAGNOSIS — L89.156 PRESSURE INJURY OF DEEP TISSUE OF SACRAL REGION: ICD-10-CM

## 2022-12-30 PROBLEM — U07.1 COVID-19: Status: RESOLVED | Noted: 2022-10-31 | Resolved: 2022-12-30

## 2022-12-30 NOTE — ASSESSMENT & PLAN NOTE
Refer the mammogram I strongly recommend that she should pursue    It is unlikely  In 12 January commend to follow-up with gynecologist for breast examination

## 2022-12-30 NOTE — PROGRESS NOTES
Name: Dimitrios Barfield      : 1950      MRN: [id number]  Encounter Provider: Isabel Wolfe MD  Encounter Date: 2022   Encounter department: [address]     1  Chronic obstructive pulmonary disease, unspecified COPD type ([facility] [address] )  Assessment & Plan:  Has been well  Her last pulmonary function test from 2022 reviewed  Patient has an appointment coming up with the pulmonologist   He has not required any inhaler  2  Chronic respiratory failure with hypoxia and hypercapnia (HCC)  Assessment & Plan:  Has been well  Her last pulmonary function test from 2022 reviewed  Patient has an appointment coming up with the pulmonologist   He has not required any inhaler  Her breathing has been fairly good  Not requiring oxygen at nighttime if it is giving him giving her dry  The nose  Recommend to discuss with neurologist for possible humidifier      3  Chronic diastolic heart failure (HCC)  Assessment & Plan:  Wt Readings from Last 3 Encounters:   22 116 kg (256 lb)   22 115 kg (254 lb)   22 115 kg (254 lb)       Heart failure compensated except some weight gain probably secondary to recent holidays as well as recent eating high salt diet  Patient does have a sliding scale for extra diuretic if weight is more than 250 pounds  Her breathing is slightly worse than before  Last ejection fraction was 53% on 3/20/2022  Patient was seen by cardiologist on 2022  Recommend to set up an appointment for the reevaluation  4  Mild asthma without complication, unspecified whether persistent  Assessment & Plan:  Has been well  Her last pulmonary function test from 2022 reviewed  Patient has an appointment coming up with the pulmonologist   He has not required any inhaler  Continue Singulair 10 mg daily      5   Type 2 diabetes mellitus with hyperosmolarity without coma, without long-term current use of insulin ([facility] [address] )  Assessment & Plan:    Lab Results   Component Value Date    HGBA1C 11 5 (H) 11/29/2022   Currently patient is supposed to be on 10 units in the morning and 35 units in the evening of Toujeo  However patient has been taking 145 units in the evening  Home sugar is in the range of 190 on average  Normal blood sugar       Patient is also on sliding scale of Humalog between 4 and 8 units with breakfast and 4 and 6 units with dinner  Current recommended plan will be to change Toujeo 25 units twice a day  Interestingly in the past was split dose long-acting worked better for her  Also recommend to watch diet more aggressively  Patient will be due for hemoglobin A1c on 2/29/2023  Recommend to bring home sugar report with her next time  6  Stage 3b chronic kidney disease [facility])  Assessment & Plan:  Lab Results   Component Value Date    EGFR 32 12/20/2022    EGFR 30 11/29/2022    EGFR 45 11/03/2022    CREATININE 1 61 (H) 12/20/2022    CREATININE 1 70 (H) 11/29/2022    CREATININE 1 21 11/03/2022   GFR is baseline  Creat is baseline  Recommend periodic blood test for monitoring of BUN and Creat  Avoid Non Steroidal Antiinflammatory such as ibuprofen, aleve etc   Potassium, HCO3 and calcium are wnl and will require periodic blood test   Bone and Mineral disease monitoring as appropriate  All patient with GFR less than 30( CKD 4 or 5 or 6) advised to follow with nephrologist       About considering nephrologist involved as he is close to his CKD level for now  7  Anemia, unspecified type  Assessment & Plan:  Lab Results   Component Value Date    WBC 9 46 11/29/2022    HGB 10 9 (L) 11/29/2022    HCT 35 6 11/29/2022    MCV 94 11/29/2022     11/29/2022     Coming up from 10 6-10 9  No active bleeding  We will continue to monitor  8  Lower leg edema  Assessment & Plan:  Lower leg edema somewhat worse  Recommend water control recommend extra diuretic as advised  Recommend to go back to cardiologist     Recommend to consider nephrologist in the future as he has CKD level 3/4      9  Impaired mobility and ADLs  Assessment & Plan:  Uses cane occasionally  10  Paroxysmal atrial fibrillation (HCC)  Assessment & Plan:  No palpitation  Remains on metoprolol as well as Eliquis  11  Essential hypertension  Assessment & Plan:  Blood pressure is well controlled  Remains on torsemide 20 mg 2 tablet daily, metoprolol succinate 50 mg daily,      12  PSVT (paroxysmal supraventricular tachycardia) (HCC)  Assessment & Plan:  No palpitation  Remains on metoprolol as well as Eliquis  13  Lung nodule  Assessment & Plan:  11/29/2022: CT scan of the chest:  Evaluation is limited by artifact on current examination  Within those limitations there appears to be 2 new sub-6 mm groundglass nodules in the right upper lobe  Additional bilateral pulmonary nodules are stable  Previously seen groundglass nodules   in the right lower lobe which were new on prior examination appear to have resolved  Previously reported enlarging nodule in the right lower lobe measuring 9 mm appears stable       Recommend 3 month CT follow-up  Center will get follow-up CAT scan in February  Also has appointment with pulmonologist        14  Mass of left breast, unspecified quadrant  Assessment & Plan:  Refer the mammogram I strongly recommend that she should pursue  It is unlikely  In 12 January commend to follow-up with gynecologist for breast examination      15  Platelets decreased ([address] )  Assessment & Plan:  Lab Results   Component Value Date    WBC 9 46 11/29/2022    HGB 10 9 (L) 11/29/2022    HCT 35 6 11/29/2022    MCV 94 11/29/2022     11/29/2022           16  Pressure injury of deep tissue of sacral region    17  Pressure injury of sacral region, stage 2 [facility])  Assessment & Plan:  Care of wound care center now 1 worse   On coloplast -hydrophilic wound driessing, not itching 18  On continuous oral anticoagulation - eliquis    19  Blister of heel, unspecified laterality, initial encounter  Assessment & Plan:  Just darted going to the wound care center  Patient is given some's specific support right heel blister  Patient is seen in the size  It is not open or draining  No procedure was done  Family reports black purple discoloration  They will continue to follow with wound care center    Fortunately been painful  Patient also has underlying neuropathy and probably microvessel disease             Subjective     Is here for follow-up of multiple medical problems as outlined in the assessment individually  CHF, chronic diastolic heart failure, remains on torsemide 2 tablet daily, Jardiance 10 mg daily, Toprol-XL 50 mg daily  Recent weight gain since the beginning of the leg probably due to holidays and eating outside  Recommend fluid control salt control follow-up with the cardiologist take extra diuretic as advised  Diabetes suboptimal control though improving usually sugar in the range of 1   Patient is currently on Toujeo 45 units daily  In the past splint does work very well she will be changing to 25 units of Toujeo twice a day  As well as she will continue Humalog sliding scale usually 6 to 8 units in the breakfast and 46 units with dinner  She will be due for hemoglobin A1c and February  Patient was seen at wound care center  Patient is on his hydrophilic ointment for the back and sacral area is much better per her  Patient also has a intact blister on the right heel  They are giving special support to dressing  It is getting darker output is not open  Comparison atrial fibrillation status stable remains on Toprol and Eliquis  Hypertension well controlled      COPD/asthma fairly stable to be seen by pulmonologist     Lung nodule patient is going for repeat CT scan in February diabetes mellitus patient has appointment with endocrinologist in the near future  Ambulation improving occasionally uses cane           Pt is here for multiple chronic disease and new heel blister    Pt reports blister on heel intact skin, for one week, no discarge, no redness, Pt is diabetic, d/w family, would refer to Wound care center, family to watch daily, keep pressure off  Chronic respiratory failure on oxygen at nighttime but not using many times due to dryness  Butock: Bilateral buttock macerated skin, Shedoes have a skin integrity impairment and has been  using barrier cream, not any worse- pt at risk explained, will refer to wound care center    BMI 46  Sleep apnea: Tolerated CPAP very well  Discussed with pulmonology            08/26/2022:  Nuclear stress test:  •  Stress ECG: No ST deviation is noted  The ECG was not diagnostic due to pharmacological (vasodilator) stress  •  Perfusion: There is a left ventricular perfusion defect that is small in size with mild reduction in uptake present in the anterior and apex location(s) that is fixed  The defect appears to be an artifact caused by breast attenuation  •  Stress Function: Left ventricular function post-stress is normal  Post-stress ejection fraction is 70 %      No ischemia seen  Breast attenuation and left arm in field of image  Pt unable to raise arm due to recent shoulder surgery  March 10, 2022 echocardiogram:     •  Left Ventricle: Left ventricular cavity size is normal  Wall thickness is mildly increased  The left ventricular ejection fraction is 53% by biplane measurement  Systolic function is low normal  Wall motion is normal  There is borderline concentric hypertrophy  •  Right Ventricle: Systolic function is low normal  Normal tricuspid annular plane systolic excursion (TAPSE) > 1 7 cm  •  Study Details: This transthoracic echocardiogram was performed at bedside  This was a routine, inpatient study  Study quality was poor   This was a technically difficult study due to decreased penetration, restricted mobility and poor acoustic windows  A complete 2D, color flow Doppler, spectral Doppler, 2D, color flow Doppler and spectral Doppler transthoracic echocardiogram was performed  The apical, parasternal, subcostal and suprasternal views were obtained  0 6 ml/min of Definity ultrasound enhancing agent was used due to opacify the left ventricle     •  Prior TTE study available for comparison  Prior study date: 5/29/2019  No significant changes noted compared to the prior study       CKD lelve 3/4; renal function acceptable,     Pulmonary: Obstructive sleep apnea: Under care of pulmonologist on BiPAP 12/6,  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:  Continue BiPAP    Result Date: 5/27/2022  Results: FEV1/FVC Ratio: 73 % Forced Vital Capacity: 0 99 L 36 % predicted FEV1: 0 72 L 34 % predicted Interpretation: · Spirometry suggests abnormal lung volumes  Recommend checking lung volumes for further evaluation  · No significant response to the administration to bronchodilator per ATS standards · Flow volume loop is restricted  CT scan of the chest, 11/29/2022:  Evaluation is limited by artifact on current examination  Within those limitations there appears to be 2 new sub-6 mm groundglass nodules in the right upper lobe  Additional bilateral pulmonary nodules are stable  Previously seen groundglass nodules   in the right lower lobe which were new on prior examination appear to have resolved  Previously reported enlarging nodule in the right lower lobe measuring 9 mm appears stable       Recommend 3 month CT follow-up  Will proceed with a follow-up CT scan back in 3 months for surveillance  She does not have any pulmonary symptoms  Recommend to get the opinion of pulmonologist about multiple nodule  Lower extremity edema:  Mild weight ggain now 254;  250 lb here discharge weight was 244    Hypertension    Hypertension well controlled -blood pressure is in the range of 828 30 systolic  Toprol succinate 50 mg once a day    Hyperlipidemia  Remains on rosuvastatin  Status post left shoulder total replacement see did home exercise he does not think she needs to go for the how occupational therapy    Chronic kidney disease :Recent creat 1 7, lytes fair        Anemia:  Improved  Most recent hemoglobin is 10 9  At the time of hospital it was 9 9 and 10 6  Previous hemoglobin has been in the range of 10 and 12  No obvious bleeding    Follow-up iron studies, iron 52, iron sat 20% ferritin 236, TIBC 261  B12, folate adequate  Prior immunofixation without any evidence of monoclonal gammopathy    Lower leg edema:  Mild to moderate  Recent chronic respiratory failure with CO2 retention:  Patient does have a hypoventilation syndrome  Good functional capacity uses CPAP during daytime 2-3 hours    07/20/2022:  Creatinine 1 69 BUN 61 calcium 9 0 LFT normal GFR 30 hemoglobin A1c 9 0 cholesterol 141 LDL 31 HDL 35 hemoglobin 11 8 rest CBC normal, urinalysis unremarkable, PTH 80 5 vitamin-D 23 9 phosphorus 4 6 magnesium 1 9 urine for protein/creatinine ratio 0 19    06/16/2022:  GFR 32 creatinine 1 62 BUN 77 hemoglobin 10 5 rest of the CBC normal  05/13/2022:  Creatinine 1 49 BUN 36 rest of the LFT unremarkable, INR 1 18, blood sugar 282 hemoglobin 9 2  06/12/2022: BMP normal except BUN 97 and creatinine 1 93, CO2 35  06/11/2022: BMP normal except BUN 98 creatinine 1 944, CO2 36, hemoglobin 10 2 and platelet 257  90/30/8275:  BUN 91 creatinine 2 06 CO2 35    07/08/2022:  CT scan of the chest:   Nodules as described above  There is interval enlargement of one of the groundglass nodules at the right lower lobe as well as interval development of several groundglass nodules  These are likely of infectious or inflammatory etiology    Recommend   follow-up in 3 months to document stability or interval change      03/29/2022:  CT scan of chest abdomen and pelvis:  Decreased bilateral effusion, pulmonary edema as described, passive atelectasis in the lower lobes, multiple lung nodule as described follow-up CT scan in 3 months recommended, small fat containing periumbilical hernia, degenerative changes in the thoracolumbar area, mild level scoliosis,    05/16/2022:  CT scan of the brain no acute intracranial abnormality new left mastoid effusion       05/27/2022:  Pulmonary function test:FEV1/FVC Ratio: 73 % Forced Vital Capacity: 0 99 L 36 % predicted FEV1: 0 72 L 34 % predicted Interpretation: · Spirometry suggests abnormal lung volumes  Recommend checking lung volumes for further evaluation  · No significant response to the administration to bronchodilator per ATS standards · Flow volume loop is restricted  06/05/2022:  Chest x-ray no acute cardiopulmonary disease        Procedure: XR chest 1 view portable    Result Date: 10/31/2022  Narrative: CHEST INDICATION:   sob  COMPARISON:  June 4, 2022 EXAM PERFORMED/VIEWS:  XR CHEST PORTABLE Images: 2 FINDINGS:  Lungs adequately aerated  Cardiomediastinal silhouette appears unremarkable  Atherosclerotic aorta  The lungs are clear  No pneumothorax or pleural effusion  Osseous structures appear within normal limits for patient age  Left shoulder arthroplasty in stable position  Thoracic spine degenerative changes  Impression: No acute cardiopulmonary disease  Workstation performed: [id number]     Procedure: CT chest wo contrast     12/2/2022: CT CHEST WITHOUT IV CONTRAST INDICATION:     Within those limitations there appears to be 2 new sub-6 mm groundglass nodules in the right upper lobe  Additional bilateral pulmonary nodules are stable  Previously seen groundglass nodules in the right lower lobe which were new on prior examination appear to have resolved  Previously reported enlarging nodule in the right lower lobe measuring 9 mm appears stable   Recommend 3 month CT follow-up  Additional findings as above   This study demonstrates a significant  finding and was documented as such in [facility] for liaison and referring practitioner notification  Sodium                    Value: 135(mmol/L)        Dt: 11/29/2022  Potassium                 Value: 4 7(mmol/L)        Dt: 11/29/2022  Chloride                  Value: 97(mmol/L)         Dt: 11/29/2022  CO2                       Value: 35(mmol/L) (H)     Dt: 11/29/2022  ANION GAP                 Value:  3(mmol/L) (L)      Dt: 11/29/2022  BUN                       Value: 48(mg/dL) (H)      Dt: 11/29/2022  Creatinine                Value: 1 70(mg/dL) (H)    Dt: 11/29/2022  Glucose                   Value: 342(mg/dL) (H)     Dt: 11/29/2022  Calcium                   Value: 9 2(mg/dL)         Dt: 11/29/2022  Corrected Calcium         Value: 9 9(mg/dL)         Dt: 11/29/2022  AST                       Value: 9(U/L)             Dt: 11/29/2022  ALT                       Value: 20(U/L)            Dt: 11/29/2022  Alkaline Phosphatase      Value: 92(U/L)            Dt: 11/29/2022  Total Protein             Value: 7 3(g/dL)          Dt: 11/29/2022  Albumin                   Value: 3 1(g/dL) (L)      Dt: 11/29/2022  Total Bilirubin           Value: 0 37(mg/dL)        Dt: 11/29/2022  eGFR                      Value: 30(ml/min/1 73sq m) Dt: 11/29/2022  Hemoglobin A1C            Value: 11 5(%) (H)        Dt: 11/29/2022  EAG                       Value: 283(mg/dl)         Dt: 11/29/2022  WBC                       Value: 9 46(Thousand/uL)  Dt: 11/29/2022  RBC                       Value: 3 77(Million/uL) (L) Dt: 11/29/2022  Hemoglobin                Value: 10 9(g/dL) (L)     Dt: 11/29/2022  Hematocrit                Value: 35 6(%)            Dt: 11/29/2022  MCV                       Value: 94(fL)             Dt: 11/29/2022  MCH                       Value: 28 9(pg)           Dt: 11/29/2022  MCHC                      Value: 30 6(g/dL) (L)     Dt: 11/29/2022  RDW                       Value: 14 4(%)            Dt: 11/29/2022  Platelets                 Value: 210(Thousands/uL)  Dt: 11/29/2022  MPV                       Value: 11 4(fL)           Dt: 11/29/2022  ------------ - 2 weeks      Review of Systems   Constitutional: Negative for chills, fatigue, fever and unexpected weight change  HENT: Negative for ear pain, postnasal drip, sinus pain and sore throat  Eyes: Negative for pain and redness  Respiratory: Positive for shortness of breath  Negative for cough and wheezing  Cardiovascular: Positive for leg swelling  Negative for chest pain and palpitations  Gastrointestinal: Negative for abdominal pain, diarrhea and nausea  Endocrine: Negative for cold intolerance, polydipsia and polyuria  Genitourinary: Negative for dysuria, frequency and urgency  Musculoskeletal: Positive for gait problem  Negative for arthralgias and myalgias  Skin: Negative for rash  Allergic/Immunologic: Negative  Neurological: Negative for dizziness, weakness and headaches  Hematological: Negative for adenopathy  Psychiatric/Behavioral: Negative for behavioral problems         Past Medical History:   Diagnosis Date   • Anemia    • Asthma    • Chronic kidney disease    • COPD (chronic obstructive pulmonary disease) ([facility] )    • COVID-19     January 2022   • Diabetes mellitus ([facility] )    • GERD (gastroesophageal reflux disease)    • Hyperlipidemia    • Hypertension    • PONV (postoperative nausea and vomiting)    • SVT (supraventricular tachycardia) ([facility])      Past Surgical History:   Procedure Laterality Date   • APPENDECTOMY     • CYSTOSCOPY W/ LASER LITHOTRIPSY Left 7/12/2016    Procedure: CYSTOSCOPY URETEROSCOPY WITH LITHOTRIPSY HOLMIUM LASER, RETROGRADE PYELOGRAM AND INSERTION STENT URETERAL;  Surgeon: Jazz Correia MD;  Location: [address] [place] [address];  Service:    • DILATION AND CURETTAGE OF UTERUS     • IR THORACENTESIS  3/18/2022   • JOINT REPLACEMENT      right knee   • KNEE ARTHROPLASTY Right    • MI ARTHROPLASTY GLENOHUMERAL JOINT TOTAL SHOULDER Left 3/1/2022    Procedure: ARTHROPLASTY SHOULDER REVERSE;  Surgeon: Keith Sweet MD;  Location: [facility];  Service: Orthopedics   • MD CYSTO BLADDER W/URETERAL CATHETERIZATION Left 2016    Procedure: CYSTOSCOPY RETROGRADE PYELOGRAM WITH INSERTION STENT URETERAL, left;  Surgeon: Charli Rodriguez MD;  Location: [facility];  Service: Urology   • SHOULDER ARTHROTOMY Left      Family History   Problem Relation Age of Onset   • Heart disease Mother    • Diabetes Father    • Emphysema Maternal Grandmother    • Heart disease Family      Social History     Socioeconomic History   • Marital status: Single     Spouse name: None   • Number of children: 0   • Years of education: 15   • Highest education level: Associate degree: academic program   Occupational History   • None   Tobacco Use   • Smoking status: Former     Packs/day:      Years:      Pack years:      Types: Cigarettes     Quit date: 1996     Years since quittin 4   • Smokeless tobacco: Never   Vaping Use   • Vaping Use: Never used   Substance and Sexual Activity   • Alcohol use: Not Currently     Comment: rarely   • Drug use: No   • Sexual activity: Not Currently   Other Topics Concern   • None   Social History Narrative   • None     Social Determinants of Health     Financial Resource Strain: Not on file   Food Insecurity: No Food Insecurity   • Worried About Running Out of Food in the Last Year: Never true   • Ran Out of Food in the Last Year: Never true   Transportation Needs: No Transportation Needs   • Lack of Transportation (Medical): No   • Lack of Transportation (Non-Medical):  No   Physical Activity: Not on file   Stress: Not on file   Social Connections: Not on file   Intimate Partner Violence: Not on file   Housing Stability: Low Risk    • Unable to Pay for Housing in the Last Year: No   • Number of Places Lived in the Last Year: 2   • Unstable Housing in the Last Year: No     Current Outpatient Medications on File Prior to Visit   Medication Sig   • acetaminophen (TYLENOL) 325 mg tablet Take 650 mg by mouth every 6 (six) hours as needed for mild pain     • albuterol (PROVENTIL HFA,VENTOLIN HFA) 90 mcg/act inhaler Inhale 2 puffs every 6 (six) hours as needed for wheezing   • ammonium lactate (LAC-HYDRIN) 12 % lotion APPLY TOPICALLY TO AFFECTED AREAS twice a day   • apixaban (ELIQUIS) 5 mg Take 1 tablet (5 mg total) by mouth in the morning and 1 tablet (5 mg total) in the evening  • B Complex-C-Folic Acid (triphrocaps) 1 MG CAPS take 1 capsule by mouth once daily   • docusate sodium (COLACE) 100 mg capsule Take 100 mg by mouth in the morning   • fluticasone (FLONASE) 50 mcg/act nasal spray 1 spray into each nostril daily   • glucose blood test strip Use 1 each 2 (two) times a day Use one 2 times daily   • insulin lispro (HumaLOG KwikPen) 100 units/mL injection pen 3 times with meal according to sliding scale - >Blood Glucose 150 - 199: 2 Unit of Insulin, Blood Glucose 200 - 249: 4 Units of Insulin, Blood Glucose 250 - 299: 6 Units of Insulin, Blood Glucose 300 - 349: 8 Units of Insulin, Blood Glucose 350 - 399: 10 Units of Inuslin,Blood Glucose greater than or equal to 400: 12 Units of Insulin-please contact your physician   • Insulin Pen Needle (BD Pen Needle Pilar 2nd Gen) 32G X 4 MM MISC For use with insulin pen  Pharmacy may dispense brand covered by insurance     • Insulin Pen Needle (NovoFine Autocover) 30G X 8 MM MISC Inject under the skin daily   • Insulin Pen Needle 30G X 8 MM MISC 2 (two) times a day   • Insulin Pen Needle 31G X 8 MM MISC Use daily Inject under the skin   • Lancets (onetouch ultrasoft) lancets Use once daily   • metoprolol succinate (TOPROL-XL) 50 mg 24 hr tablet Take 1 tablet (50 mg total) by mouth daily   • montelukast (SINGULAIR) 10 mg tablet Take 10 mg by mouth daily     • pregabalin (LYRICA) 100 mg capsule take 1 capsule by mouth twice a day   • rosuvastatin (CRESTOR) 10 MG tablet Take 1 tablet (10 mg total) by mouth daily   • torsemide (DEMADEX) 20 mg tablet Take 2 tablets in the AM   • Toujeo SoloStar 300 units/mL CONCENTRATED U-300 injection pen (1-unit dial) Inject 35 Units under the skin daily at bedtime     Allergies   Allergen Reactions   • Penicillins Hives   • Moxifloxacin Other (See Comments)     unknown   • Percocet [Oxycodone-Acetaminophen] Other (See Comments)     GI upset   • Zinc Acetate Other (See Comments)     unknown   • Asa [Aspirin] GI Intolerance   • Indocin [Indomethacin] Other (See Comments)     Made patient "loopy"   • Other Other (See Comments)     unknown     Immunization History   Administered Date(s) Administered   • COVID-19 MODERNA VACC 0 5 ML IM 02/11/2022       Objective     /70   Pulse 73   Ht 5' 2" (1 575 m)   Wt 116 kg (256 lb)   SpO2 94%   BMI 46 82 kg/m²     Physical Exam  Vitals reviewed  Exam conducted with a chaperone present  Constitutional:       General: She is not in acute distress  Appearance: She is well-developed  She is obese  She is not ill-appearing, toxic-appearing or diaphoretic  HENT:      Head: Normocephalic and atraumatic  Right Ear: External ear normal       Left Ear: External ear normal       Nose: No congestion or rhinorrhea  Eyes:      General: Lids are normal          Right eye: No discharge  Left eye: No discharge  Conjunctiva/sclera: Conjunctivae normal    Neck:      Thyroid: No thyroid mass, thyromegaly or thyroid tenderness  Vascular: No carotid bruit or JVD  Trachea: Trachea normal    Cardiovascular:      Rate and Rhythm: Rhythm irregular  Heart sounds: Normal heart sounds  No murmur heard  Pulmonary:      Effort: No respiratory distress  Breath sounds: Normal breath sounds  No wheezing, rhonchi or rales  Abdominal:      General: Bowel sounds are normal  There is no distension  Palpations: There is no mass  Tenderness:  There is no abdominal tenderness  There is no rebound  Musculoskeletal:      Right lower le+ Pitting Edema present  Left lower le+ Pitting Edema present  Lymphadenopathy:      Cervical: No cervical adenopathy  Skin:     General: Skin is warm  Coloration: Skin is not jaundiced or pale  Findings: No rash  Comments: Right heel blister is now dried up and flat with blackish discoloration  No surrounding cellulitis  Nontender  Buttock area is much better  Dry powder is present  In the inner buttock area there is a small superficial but improved of the skin around 1 inch present    Lower back there is a faint rash present   Neurological:      Mental Status: She is alert  Coordination: Coordination normal       Gait: Gait abnormal    Psychiatric:         Mood and Affect: Mood normal          Behavior: Behavior normal          Thought Content:  Thought content normal          Judgment: Judgment normal        Jd Dow MD

## 2022-12-30 NOTE — ASSESSMENT & PLAN NOTE
Has been well  Her last pulmonary function test from 5/27/2022 reviewed  Patient has an appointment coming up with the pulmonologist   He has not required any inhaler      Continue Singulair 10 mg daily

## 2022-12-30 NOTE — ASSESSMENT & PLAN NOTE
Lab Results   Component Value Date    WBC 9 46 11/29/2022    HGB 10 9 (L) 11/29/2022    HCT 35 6 11/29/2022    MCV 94 11/29/2022     11/29/2022     Coming up from 10 6-10 9  No active bleeding  We will continue to monitor

## 2022-12-30 NOTE — ASSESSMENT & PLAN NOTE
Lower leg edema somewhat worse  Recommend water control recommend extra diuretic as advised    Recommend to go back to cardiologist     Recommend to consider nephrologist in the future as he has CKD level 3/4

## 2022-12-30 NOTE — ASSESSMENT & PLAN NOTE
Lab Results   Component Value Date    WBC 9 46 11/29/2022    HGB 10 9 (L) 11/29/2022    HCT 35 6 11/29/2022    MCV 94 11/29/2022     11/29/2022

## 2022-12-30 NOTE — ASSESSMENT & PLAN NOTE
Has been well  Her last pulmonary function test from 5/27/2022 reviewed  Patient has an appointment coming up with the pulmonologist   He has not required any inhaler

## 2022-12-30 NOTE — ASSESSMENT & PLAN NOTE
Lab Results   Component Value Date    HGBA1C 11 5 (H) 11/29/2022   Currently patient is supposed to be on 10 units in the morning and 35 units in the evening of Toujeo  However patient has been taking 145 units in the evening  Home sugar is in the range of 190 on average  Normal blood sugar       Patient is also on sliding scale of Humalog between 4 and 8 units with breakfast and 4 and 6 units with dinner  Current recommended plan will be to change Toujeo 25 units twice a day  Interestingly in the past was split dose long-acting worked better for her  Also recommend to watch diet more aggressively  Patient will be due for hemoglobin A1c on 2/29/2023  Recommend to bring home sugar report with her next time

## 2022-12-30 NOTE — ASSESSMENT & PLAN NOTE
Lab Results   Component Value Date    LDLCALC 61 12/20/2022     Lab Results   Component Value Date    ALT 21 12/20/2022     Lab Results   Component Value Date    CHOLESTEROL 155 12/20/2022    CHOLESTEROL 141 07/20/2022    CHOLESTEROL 104 03/10/2022     Lab Results   Component Value Date    HDL 42 (L) 12/20/2022    HDL 35 (L) 07/20/2022    HDL 30 (L) 03/10/2022     Lab Results   Component Value Date    TRIG 259 (H) 12/20/2022    TRIG 376 (H) 07/20/2022    TRIG 132 03/10/2022     Lab Results   Component Value Date    NONHDLC 113 12/20/2022    [place] 106 07/20/2022   Had her continue rosuvastatin 10 mg daily

## 2022-12-30 NOTE — ASSESSMENT & PLAN NOTE
Has been well  Her last pulmonary function test from 5/27/2022 reviewed  Patient has an appointment coming up with the pulmonologist   He has not required any inhaler  Her breathing has been fairly good  Not requiring oxygen at nighttime if it is giving him giving her dry  The nose      Recommend to discuss with neurologist for possible humidifier

## 2022-12-30 NOTE — ASSESSMENT & PLAN NOTE
Blood pressure is well controlled    Remains on torsemide 20 mg 2 tablet daily, metoprolol succinate 50 mg daily,

## 2022-12-30 NOTE — ASSESSMENT & PLAN NOTE
Lab Results   Component Value Date    EGFR 32 12/20/2022    EGFR 30 11/29/2022    EGFR 45 11/03/2022    CREATININE 1 61 (H) 12/20/2022    CREATININE 1 70 (H) 11/29/2022    CREATININE 1 21 11/03/2022   GFR is baseline  Creat is baseline  Recommend periodic blood test for monitoring of BUN and Creat  Avoid Non Steroidal Antiinflammatory such as ibuprofen, aleve etc   Potassium, HCO3 and calcium are wnl and will require periodic blood test   Bone and Mineral disease monitoring as appropriate  All patient with GFR less than 30( CKD 4 or 5 or 6) advised to follow with nephrologist       About considering nephrologist involved as he is close to his CKD level for now

## 2022-12-30 NOTE — ASSESSMENT & PLAN NOTE
11/29/2022: CT scan of the chest:  Evaluation is limited by artifact on current examination  Within those limitations there appears to be 2 new sub-6 mm groundglass nodules in the right upper lobe  Additional bilateral pulmonary nodules are stable  Previously seen groundglass nodules   in the right lower lobe which were new on prior examination appear to have resolved  Previously reported enlarging nodule in the right lower lobe measuring 9 mm appears stable       Recommend 3 month CT follow-up  Center will get follow-up CAT scan in February    Also has appointment with pulmonologist

## 2022-12-30 NOTE — ASSESSMENT & PLAN NOTE
Just darted going to the wound care center  Patient is given some's specific support right heel blister  Patient is seen in the size  It is not open or draining  No procedure was done  Family reports black purple discoloration  They will continue to follow with wound care center    Fortunately been painful    Patient also has underlying neuropathy and probably microvessel disease

## 2022-12-30 NOTE — PATIENT INSTRUCTIONS
Call and set up an appointment with cardiologist   Last time most likely you saw them on 9/9/2022  Control your salt intake  Consider taking extra diuretic for that as advised if the weight is more than 250 pounds  Keep your home sugar report  Follow-up with the pulmonologist and cardiologist as planned within the next 6 weeks  He will be due for the blood test in end of the February for your hemoglobin A1c and other studies  Follow with Consultants as per their and our suggestion    Follow up in one month or as needed earlier    Follow all instructions as advised and discussed  Take your medications as prescribed  Call the office immediately if you experience any side effects  Ask questions if you do not understand  Keep your scheduled appointment as advised or come sooner if necessary or in doubt  Best time to call for non-urgent matter or questions on weekdays is between 9am and 12 noon  See physician for any new symptoms or worsening of current symptoms  Urgent or emergent situations call 911 and report to nearest emergency room      I spent  30 -40 minutes taking care of this patient including clinical care, conseling, collaboration, chart, lab and consultaion review as appropriate    Patient is to get labs 1 week(s) prior to next visit if advised

## 2022-12-30 NOTE — ASSESSMENT & PLAN NOTE
Wt Readings from Last 3 Encounters:   12/30/22 116 kg (256 lb)   12/20/22 115 kg (254 lb)   12/06/22 115 kg (254 lb)       Heart failure compensated except some weight gain probably secondary to recent holidays as well as recent eating high salt diet  Patient does have a sliding scale for extra diuretic if weight is more than 250 pounds  Her breathing is slightly worse than before  Last ejection fraction was 53% on 3/20/2022  Patient was seen by cardiologist on 9/9/2022  Recommend to set up an appointment for the reevaluation

## 2023-01-01 ENCOUNTER — PATIENT OUTREACH (OUTPATIENT)
Dept: CASE MANAGEMENT | Facility: OTHER | Age: 73
End: 2023-01-01

## 2023-01-01 ENCOUNTER — APPOINTMENT (EMERGENCY)
Dept: RADIOLOGY | Facility: HOSPITAL | Age: 73
DRG: 871 | End: 2023-01-01
Payer: MEDICARE

## 2023-01-01 ENCOUNTER — APPOINTMENT (INPATIENT)
Dept: RADIOLOGY | Facility: HOSPITAL | Age: 73
DRG: 871 | End: 2023-01-01
Payer: MEDICARE

## 2023-01-01 ENCOUNTER — TELEPHONE (OUTPATIENT)
Dept: NEPHROLOGY | Facility: CLINIC | Age: 73
End: 2023-01-01

## 2023-01-01 ENCOUNTER — APPOINTMENT (INPATIENT)
Dept: NON INVASIVE DIAGNOSTICS | Facility: HOSPITAL | Age: 73
DRG: 871 | End: 2023-01-01
Payer: MEDICARE

## 2023-01-01 ENCOUNTER — APPOINTMENT (INPATIENT)
Dept: ULTRASOUND IMAGING | Facility: HOSPITAL | Age: 73
DRG: 871 | End: 2023-01-01
Payer: MEDICARE

## 2023-01-01 ENCOUNTER — HOSPITAL ENCOUNTER (INPATIENT)
Facility: HOSPITAL | Age: 73
LOS: 1 days | DRG: 871 | End: 2023-08-29
Attending: EMERGENCY MEDICINE | Admitting: INTERNAL MEDICINE
Payer: MEDICARE

## 2023-01-01 ENCOUNTER — HOSPITAL ENCOUNTER (OUTPATIENT)
Dept: NON INVASIVE DIAGNOSTICS | Facility: HOSPITAL | Age: 73
Discharge: HOME | End: 2023-08-28
Attending: RADIOLOGY
Payer: MEDICARE

## 2023-01-01 ENCOUNTER — APPOINTMENT (EMERGENCY)
Dept: CT IMAGING | Facility: HOSPITAL | Age: 73
DRG: 871 | End: 2023-01-01
Payer: MEDICARE

## 2023-01-01 ENCOUNTER — TELEPHONE (OUTPATIENT)
Facility: HOSPITAL | Age: 73
End: 2023-01-01

## 2023-01-01 VITALS
BODY MASS INDEX: 48.58 KG/M2 | SYSTOLIC BLOOD PRESSURE: 65 MMHG | OXYGEN SATURATION: 91 % | RESPIRATION RATE: 28 BRPM | HEART RATE: 96 BPM | TEMPERATURE: 96.1 F | DIASTOLIC BLOOD PRESSURE: 47 MMHG | HEIGHT: 62 IN | WEIGHT: 264 LBS

## 2023-01-01 DIAGNOSIS — I48.91 A-FIB (HCC): ICD-10-CM

## 2023-01-01 DIAGNOSIS — R65.21 SEPTIC SHOCK (HCC): ICD-10-CM

## 2023-01-01 DIAGNOSIS — J96.00 ACUTE RESPIRATORY FAILURE, UNSPECIFIED WHETHER WITH HYPOXIA OR HYPERCAPNIA (HCC): ICD-10-CM

## 2023-01-01 DIAGNOSIS — E87.5 HYPERKALEMIA: ICD-10-CM

## 2023-01-01 DIAGNOSIS — E87.20 METABOLIC ACIDOSIS: ICD-10-CM

## 2023-01-01 DIAGNOSIS — J18.9 SEPSIS DUE TO PNEUMONIA (HCC): ICD-10-CM

## 2023-01-01 DIAGNOSIS — N18.9 ACUTE KIDNEY INJURY SUPERIMPOSED ON CHRONIC KIDNEY DISEASE (HCC): Primary | ICD-10-CM

## 2023-01-01 DIAGNOSIS — I46.9 CARDIAC ARREST (HCC): ICD-10-CM

## 2023-01-01 DIAGNOSIS — N17.9 ACUTE KIDNEY INJURY SUPERIMPOSED ON CHRONIC KIDNEY DISEASE (HCC): Primary | ICD-10-CM

## 2023-01-01 DIAGNOSIS — J90 PLEURAL EFFUSION: ICD-10-CM

## 2023-01-01 DIAGNOSIS — N17.9 ACUTE RENAL FAILURE, UNSPECIFIED ACUTE RENAL FAILURE TYPE (HCC): Primary | ICD-10-CM

## 2023-01-01 DIAGNOSIS — A41.9 SEPSIS DUE TO PNEUMONIA (HCC): ICD-10-CM

## 2023-01-01 DIAGNOSIS — A41.9 SEPTIC SHOCK (HCC): ICD-10-CM

## 2023-01-01 DIAGNOSIS — N61.1 BREAST ABSCESS OF FEMALE: ICD-10-CM

## 2023-01-01 DIAGNOSIS — A41.9 SEPSIS (HCC): ICD-10-CM

## 2023-01-01 LAB
ABO GROUP BLD: NORMAL
ALBUMIN SERPL BCP-MCNC: 3 G/DL (ref 3.5–5)
ALBUMIN SERPL BCP-MCNC: 3.6 G/DL (ref 3.5–5)
ALP SERPL-CCNC: 139 U/L (ref 34–104)
ALP SERPL-CCNC: 142 U/L (ref 34–104)
ALT SERPL W P-5'-P-CCNC: 64 U/L (ref 7–52)
ALT SERPL W P-5'-P-CCNC: >5000 U/L (ref 7–52)
AMORPH URATE CRY URNS QL MICRO: ABNORMAL
ANION GAP SERPL CALCULATED.3IONS-SCNC: 16 MMOL/L
ANION GAP SERPL CALCULATED.3IONS-SCNC: 19 MMOL/L
ANION GAP SERPL CALCULATED.3IONS-SCNC: 23 MMOL/L
ANION GAP SERPL CALCULATED.3IONS-SCNC: 26 MMOL/L
ANION GAP SERPL CALCULATED.3IONS-SCNC: 27 MMOL/L
ANISOCYTOSIS BLD QL SMEAR: PRESENT
APAP SERPL-MCNC: <10 UG/ML (ref 10–20)
APPEARANCE FLD: CLEAR
APTT PPP: 46 SECONDS (ref 23–37)
APTT PPP: 85 SECONDS (ref 23–37)
AST SERPL W P-5'-P-CCNC: 75 U/L (ref 13–39)
AST SERPL W P-5'-P-CCNC: ABNORMAL U/L (ref 13–39)
ATRIAL RATE: 117 BPM
BACTERIA UR QL AUTO: ABNORMAL /HPF
BASE EX.OXY STD BLDV CALC-SCNC: 54.8 % (ref 60–80)
BASE EX.OXY STD BLDV CALC-SCNC: 87.8 % (ref 60–80)
BASE EXCESS BLDA CALC-SCNC: -15 MMOL/L (ref -2–3)
BASE EXCESS BLDA CALC-SCNC: -15.5 MMOL/L
BASE EXCESS BLDA CALC-SCNC: -16.1 MMOL/L
BASE EXCESS BLDA CALC-SCNC: -16.1 MMOL/L
BASE EXCESS BLDA CALC-SCNC: -17.4 MMOL/L
BASE EXCESS BLDA CALC-SCNC: -22.3 MMOL/L
BASE EXCESS BLDV CALC-SCNC: -13.8 MMOL/L
BASE EXCESS BLDV CALC-SCNC: -19.4 MMOL/L
BASOPHILS # BLD AUTO: 0.07 THOUSANDS/ÂΜL (ref 0–0.1)
BASOPHILS # BLD MANUAL: 0 THOUSAND/UL (ref 0–0.1)
BASOPHILS NFR BLD AUTO: 0 % (ref 0–1)
BASOPHILS NFR FLD MANUAL: 1 %
BASOPHILS NFR MAR MANUAL: 0 % (ref 0–1)
BILIRUB DIRECT SERPL-MCNC: 0.74 MG/DL (ref 0–0.2)
BILIRUB SERPL-MCNC: 0.6 MG/DL (ref 0.2–1)
BILIRUB SERPL-MCNC: 1.79 MG/DL (ref 0.2–1)
BILIRUB UR QL STRIP: NEGATIVE
BLD GP AB SCN SERPL QL: NEGATIVE
BLD SMEAR INTERP: NORMAL
BNP SERPL-MCNC: 156 PG/ML (ref 0–100)
BUN SERPL-MCNC: 59 MG/DL (ref 5–25)
BUN SERPL-MCNC: 62 MG/DL (ref 5–25)
BUN SERPL-MCNC: 69 MG/DL (ref 5–25)
BUN SERPL-MCNC: 84 MG/DL (ref 5–25)
BUN SERPL-MCNC: 91 MG/DL (ref 5–25)
BURR CELLS BLD QL SMEAR: PRESENT
BURR CELLS BLD QL SMEAR: PRESENT
CA-I BLD-SCNC: 1.04 MMOL/L (ref 1.12–1.32)
CA-I BLD-SCNC: 1.09 MMOL/L (ref 1.12–1.32)
CA-I BLD-SCNC: 1.1 MMOL/L (ref 1.12–1.32)
CA-I BLD-SCNC: 1.15 MMOL/L (ref 1.12–1.32)
CA-I BLD-SCNC: 1.24 MMOL/L (ref 1.12–1.32)
CALCIUM SERPL-MCNC: 8.3 MG/DL (ref 8.4–10.2)
CALCIUM SERPL-MCNC: 8.4 MG/DL (ref 8.4–10.2)
CALCIUM SERPL-MCNC: 8.5 MG/DL (ref 8.4–10.2)
CALCIUM SERPL-MCNC: 8.7 MG/DL (ref 8.4–10.2)
CALCIUM SERPL-MCNC: 9 MG/DL (ref 8.4–10.2)
CAOX CRY URNS QL MICRO: ABNORMAL /HPF
CARDIAC TROPONIN I PNL SERPL HS: 12 NG/L
CARDIAC TROPONIN I PNL SERPL HS: 127 NG/L (ref 8–18)
CFFMA (FUNCTIONAL FIBRINOGEN MAX AMPLITUDE): 10.8 MM (ref 15–32)
CHLORIDE SERPL-SCNC: 100 MMOL/L (ref 96–108)
CHLORIDE SERPL-SCNC: 101 MMOL/L (ref 96–108)
CHLORIDE SERPL-SCNC: 101 MMOL/L (ref 96–108)
CHLORIDE SERPL-SCNC: 103 MMOL/L (ref 96–108)
CHLORIDE SERPL-SCNC: 98 MMOL/L (ref 96–108)
CKLY30: 0 % (ref 0–2.6)
CKR(REACTION TIME): >17 MIN (ref 4.6–9.1)
CLARITY UR: ABNORMAL
CO2 SERPL-SCNC: 13 MMOL/L (ref 21–32)
CO2 SERPL-SCNC: 15 MMOL/L (ref 21–32)
CO2 SERPL-SCNC: 21 MMOL/L (ref 21–32)
COLOR FLD: YELLOW
COLOR UR: YELLOW
CREAT SERPL-MCNC: 4.27 MG/DL (ref 0.6–1.3)
CREAT SERPL-MCNC: 4.48 MG/DL (ref 0.6–1.3)
CREAT SERPL-MCNC: 5.25 MG/DL (ref 0.6–1.3)
CREAT SERPL-MCNC: 6.76 MG/DL (ref 0.6–1.3)
CREAT SERPL-MCNC: 7.38 MG/DL (ref 0.6–1.3)
CRTMA(RAPIDTEG MAX AMPLITUDE): 57.5 MM (ref 52–70)
D DIMER PPP FEU-MCNC: >20 UG/ML FEU
DEPRECATED AT III PPP: 47 % OF NORMAL (ref 92–136)
DME PARACHUTE DELIVERY DATE ACTUAL: NORMAL
DME PARACHUTE DELIVERY DATE EXPECTED: NORMAL
DME PARACHUTE DELIVERY DATE REQUESTED: NORMAL
DME PARACHUTE DELIVERY DATE REQUESTED: NORMAL
DME PARACHUTE ITEM DESCRIPTION: NORMAL
DME PARACHUTE ORDER STATUS: NORMAL
DME PARACHUTE ORDER STATUS: NORMAL
DME PARACHUTE SUPPLIER NAME: NORMAL
DME PARACHUTE SUPPLIER NAME: NORMAL
DME PARACHUTE SUPPLIER PHONE: NORMAL
DME PARACHUTE SUPPLIER PHONE: NORMAL
DOP CALC LVOT AREA: 3.14 CM2
DOP CALC LVOT DIAMETER: 2 CM
EOSINOPHIL # BLD AUTO: 0 THOUSAND/ÂΜL (ref 0–0.61)
EOSINOPHIL # BLD MANUAL: 0 THOUSAND/UL (ref 0–0.4)
EOSINOPHIL NFR BLD AUTO: 0 % (ref 0–6)
EOSINOPHIL NFR BLD MANUAL: 0 % (ref 0–6)
ERYTHROCYTE [DISTWIDTH] IN BLOOD BY AUTOMATED COUNT: 16.9 % (ref 11.6–15.1)
ERYTHROCYTE [DISTWIDTH] IN BLOOD BY AUTOMATED COUNT: 17 % (ref 11.6–15.1)
ERYTHROCYTE [DISTWIDTH] IN BLOOD BY AUTOMATED COUNT: 17.1 % (ref 11.6–15.1)
ERYTHROCYTE [DISTWIDTH] IN BLOOD BY AUTOMATED COUNT: 17.2 % (ref 11.6–15.1)
ETHANOL SERPL-MCNC: <10 MG/DL
FDP BLD QL AGGL: ABNORMAL
FIBRINOGEN PPP-MCNC: 131 MG/DL (ref 207–520)
FIO2 GAS DIL.REBREATH: 100 L
FRACTIONAL SHORTENING: 26 (ref 28–44)
GFR SERPL CREATININE-BSD FRML MDRD: 5 ML/MIN/1.73SQ M
GFR SERPL CREATININE-BSD FRML MDRD: 5 ML/MIN/1.73SQ M
GFR SERPL CREATININE-BSD FRML MDRD: 7 ML/MIN/1.73SQ M
GFR SERPL CREATININE-BSD FRML MDRD: 9 ML/MIN/1.73SQ M
GFR SERPL CREATININE-BSD FRML MDRD: 9 ML/MIN/1.73SQ M
GLUCOSE SERPL-MCNC: 109 MG/DL (ref 65–140)
GLUCOSE SERPL-MCNC: 118 MG/DL (ref 65–140)
GLUCOSE SERPL-MCNC: 124 MG/DL (ref 65–140)
GLUCOSE SERPL-MCNC: 130 MG/DL (ref 65–140)
GLUCOSE SERPL-MCNC: 135 MG/DL (ref 65–140)
GLUCOSE SERPL-MCNC: 154 MG/DL (ref 65–140)
GLUCOSE SERPL-MCNC: 58 MG/DL (ref 65–140)
GLUCOSE SERPL-MCNC: 79 MG/DL (ref 65–140)
GLUCOSE UR STRIP-MCNC: NEGATIVE MG/DL
HCO3 BLDA-SCNC: 10.4 MMOL/L (ref 22–28)
HCO3 BLDA-SCNC: 10.9 MMOL/L (ref 22–28)
HCO3 BLDA-SCNC: 12.1 MMOL/L (ref 22–28)
HCO3 BLDA-SCNC: 13 MMOL/L (ref 22–28)
HCO3 BLDA-SCNC: 14.8 MMOL/L (ref 22–28)
HCO3 BLDA-SCNC: 8.5 MMOL/L (ref 22–28)
HCO3 BLDV-SCNC: 11.6 MMOL/L (ref 24–30)
HCO3 BLDV-SCNC: 14.3 MMOL/L (ref 24–30)
HCT VFR BLD AUTO: 29.3 % (ref 34.8–46.1)
HCT VFR BLD AUTO: 30.3 % (ref 34.8–46.1)
HCT VFR BLD AUTO: 31 % (ref 34.8–46.1)
HCT VFR BLD AUTO: 31.2 % (ref 34.8–46.1)
HCT VFR BLD CALC: 26 % (ref 34.8–46.1)
HGB BLD-MCNC: 8.6 G/DL (ref 11.5–15.4)
HGB BLD-MCNC: 8.9 G/DL (ref 11.5–15.4)
HGB BLD-MCNC: 9 G/DL (ref 11.5–15.4)
HGB BLD-MCNC: 9.5 G/DL (ref 11.5–15.4)
HGB BLDA-MCNC: 8.8 G/DL (ref 11.5–15.4)
HGB UR QL STRIP.AUTO: ABNORMAL
HISTIOCYTES NFR FLD: 1 %
HOROWITZ INDEX BLDA+IHG-RTO: 100 MM[HG]
HOROWITZ INDEX BLDA+IHG-RTO: 100 MM[HG]
HOROWITZ INDEX BLDA+IHG-RTO: 80 MM[HG]
HYALINE CASTS #/AREA URNS LPF: ABNORMAL /LPF
HYPERCHROMIA BLD QL SMEAR: PRESENT
HYPERCHROMIA BLD QL SMEAR: PRESENT
IMM GRANULOCYTES # BLD AUTO: 0.32 THOUSAND/UL (ref 0–0.2)
IMM GRANULOCYTES NFR BLD AUTO: 2 % (ref 0–2)
INR PPP: 3.37 (ref 0.84–1.19)
INTERVENTRICULAR SEPTUM IN DIASTOLE (PARASTERNAL SHORT AXIS VIEW): 1.1 CM
INTERVENTRICULAR SEPTUM: 1.1 CM (ref 0.6–1.1)
KETONES UR STRIP-MCNC: NEGATIVE MG/DL
LACTATE SERPL-SCNC: 16.6 MMOL/L (ref 0.5–2)
LACTATE SERPL-SCNC: 17.3 MMOL/L (ref 0.5–2)
LACTATE SERPL-SCNC: 18.1 MMOL/L (ref 0.5–2)
LACTATE SERPL-SCNC: 4 MMOL/L (ref 0.5–2)
LACTATE SERPL-SCNC: 6.1 MMOL/L (ref 0.5–2)
LDH FLD L TO P-CCNC: 150 U/L
LEFT INTERNAL DIMENSION IN SYSTOLE: 2.5 CM (ref 2.1–4)
LEFT VENTRICULAR INTERNAL DIMENSION IN DIASTOLE: 3.4 CM (ref 3.5–6)
LEFT VENTRICULAR POSTERIOR WALL IN END DIASTOLE: 1.1 CM
LEFT VENTRICULAR STROKE VOLUME: 24 ML
LEUKOCYTE ESTERASE UR QL STRIP: ABNORMAL
LG PLATELETS BLD QL SMEAR: PRESENT
LIPASE SERPL-CCNC: 22 U/L (ref 11–82)
LVSV (TEICH): 24 ML
LYMPHOCYTES # BLD AUTO: 1.37 THOUSAND/UL (ref 0.6–4.47)
LYMPHOCYTES # BLD AUTO: 1.58 THOUSAND/UL (ref 0.6–4.47)
LYMPHOCYTES # BLD AUTO: 1.98 THOUSANDS/ÂΜL (ref 0.6–4.47)
LYMPHOCYTES # BLD AUTO: 2.15 THOUSAND/UL (ref 0.6–4.47)
LYMPHOCYTES # BLD AUTO: 4 % (ref 14–44)
LYMPHOCYTES # BLD AUTO: 6 % (ref 14–44)
LYMPHOCYTES # BLD AUTO: 8 % (ref 14–44)
LYMPHOCYTES NFR BLD AUTO: 11 % (ref 14–44)
LYMPHOCYTES NFR BLD AUTO: 30 %
MAGNESIUM SERPL-MCNC: 2.2 MG/DL (ref 1.9–2.7)
MAGNESIUM SERPL-MCNC: 2.3 MG/DL (ref 1.9–2.7)
MAGNESIUM SERPL-MCNC: 2.5 MG/DL (ref 1.9–2.7)
MAGNESIUM SERPL-MCNC: 2.5 MG/DL (ref 1.9–2.7)
MCH RBC QN AUTO: 28.4 PG (ref 26.8–34.3)
MCH RBC QN AUTO: 28.4 PG (ref 26.8–34.3)
MCH RBC QN AUTO: 28.6 PG (ref 26.8–34.3)
MCH RBC QN AUTO: 28.8 PG (ref 26.8–34.3)
MCHC RBC AUTO-ENTMCNC: 29 G/DL (ref 31.4–37.4)
MCHC RBC AUTO-ENTMCNC: 29.4 G/DL (ref 31.4–37.4)
MCHC RBC AUTO-ENTMCNC: 29.4 G/DL (ref 31.4–37.4)
MCHC RBC AUTO-ENTMCNC: 30.4 G/DL (ref 31.4–37.4)
MCV RBC AUTO: 95 FL (ref 82–98)
MCV RBC AUTO: 97 FL (ref 82–98)
MCV RBC AUTO: 97 FL (ref 82–98)
MCV RBC AUTO: 98 FL (ref 82–98)
METAMYELOCYTES NFR BLD MANUAL: 2 % (ref 0–1)
MONOCYTES # BLD AUTO: 0.36 THOUSAND/UL (ref 0–1.22)
MONOCYTES # BLD AUTO: 1.03 THOUSAND/UL (ref 0–1.22)
MONOCYTES # BLD AUTO: 1.2 THOUSAND/ÂΜL (ref 0.17–1.22)
MONOCYTES # BLD AUTO: 2.77 THOUSAND/UL (ref 0–1.22)
MONOCYTES NFR BLD AUTO: 7 % (ref 4–12)
MONOCYTES NFR BLD: 1 % (ref 4–12)
MONOCYTES NFR BLD: 6 % (ref 4–12)
MONOCYTES NFR BLD: 7 % (ref 4–12)
MUCOUS THREADS UR QL AUTO: ABNORMAL
MYELOCYTES NFR BLD MANUAL: 2 % (ref 0–1)
MYELOCYTES NFR BLD MANUAL: 3 % (ref 0–1)
NEUTROPHILS # BLD AUTO: 14.28 THOUSANDS/ÂΜL (ref 1.85–7.62)
NEUTROPHILS # BLD MANUAL: 13.88 THOUSAND/UL (ref 1.85–7.62)
NEUTROPHILS # BLD MANUAL: 32.58 THOUSAND/UL (ref 1.85–7.62)
NEUTROPHILS # BLD MANUAL: 35.17 THOUSAND/UL (ref 1.85–7.62)
NEUTS BAND NFR BLD MANUAL: 4 % (ref 0–8)
NEUTS BAND NFR BLD MANUAL: 5 % (ref 0–8)
NEUTS BAND NFR BLD MANUAL: 5 % (ref 0–8)
NEUTS SEG NFR BLD AUTO: 68 %
NEUTS SEG NFR BLD AUTO: 77 % (ref 43–75)
NEUTS SEG NFR BLD AUTO: 80 % (ref 43–75)
NEUTS SEG NFR BLD AUTO: 84 % (ref 43–75)
NEUTS SEG NFR BLD AUTO: 86 % (ref 43–75)
NITRITE UR QL STRIP: NEGATIVE
NON-SQ EPI CELLS URNS QL MICRO: ABNORMAL /HPF
NRBC BLD AUTO-RTO: 0 /100 WBCS
NRBC BLD AUTO-RTO: 2 /100 WBC (ref 0–2)
O2 CT BLDA-SCNC: 12.8 ML/DL (ref 16–23)
O2 CT BLDA-SCNC: 12.8 ML/DL (ref 16–23)
O2 CT BLDA-SCNC: 12.9 ML/DL (ref 16–23)
O2 CT BLDA-SCNC: 16.1 ML/DL (ref 16–23)
O2 CT BLDA-SCNC: 18.3 ML/DL (ref 16–23)
O2 CT BLDV-SCNC: 13.1 ML/DL
O2 CT BLDV-SCNC: 7.6 ML/DL
OSMOLALITY UR/SERPL-RTO: 340 MMOL/KG (ref 282–298)
OSMOLALITY UR: 170 MMOL/KG
OXYHGB MFR BLDA: 94.3 % (ref 94–97)
OXYHGB MFR BLDA: 94.8 % (ref 94–97)
OXYHGB MFR BLDA: 95.9 % (ref 94–97)
OXYHGB MFR BLDA: 96.4 % (ref 94–97)
OXYHGB MFR BLDA: 97.3 % (ref 94–97)
PCO2 BLD: 16 MMOL/L (ref 21–32)
PCO2 BLD: 55 MM HG (ref 36–44)
PCO2 BLDA: 30 MM HG (ref 36–44)
PCO2 BLDA: 31.6 MM HG (ref 36–44)
PCO2 BLDA: 35.4 MM HG (ref 36–44)
PCO2 BLDA: 36.6 MM HG (ref 36–44)
PCO2 BLDA: 42.9 MM HG (ref 36–44)
PCO2 BLDV: 41.9 MM HG (ref 42–50)
PCO2 BLDV: 51.4 MM HG (ref 42–50)
PEEP RESPIRATORY: 6 CM[H2O]
PH BLD: 7.04 [PH] (ref 7.35–7.45)
PH BLDA: 6.99 [PH] (ref 7.35–7.45)
PH BLDA: 7.1 [PH] (ref 7.35–7.45)
PH BLDA: 7.13 [PH] (ref 7.35–7.45)
PH BLDA: 7.15 [PH] (ref 7.35–7.45)
PH BLDA: 7.18 [PH] (ref 7.35–7.45)
PH BLDV: 6.97 [PH] (ref 7.3–7.4)
PH BLDV: 7.15 [PH] (ref 7.3–7.4)
PH UR STRIP.AUTO: 5.5 [PH]
PHOSPHATE SERPL-MCNC: 10.5 MG/DL (ref 2.3–4.1)
PHOSPHATE SERPL-MCNC: 10.8 MG/DL (ref 2.3–4.1)
PHOSPHATE SERPL-MCNC: 7.7 MG/DL (ref 2.3–4.1)
PHOSPHATE SERPL-MCNC: 9.2 MG/DL (ref 2.3–4.1)
PLATELET # BLD AUTO: 135 THOUSANDS/UL (ref 149–390)
PLATELET # BLD AUTO: 138 THOUSANDS/UL (ref 149–390)
PLATELET # BLD AUTO: 146 THOUSANDS/UL (ref 149–390)
PLATELET # BLD AUTO: 586 THOUSANDS/UL (ref 149–390)
PLATELET # BLD AUTO: 680 THOUSANDS/UL (ref 149–390)
PLATELET BLD QL SMEAR: ABNORMAL
PLATELET BLD QL SMEAR: ABNORMAL
PLATELET BLD QL SMEAR: ADEQUATE
PLATELET CLUMP BLD QL SMEAR: PRESENT
PMV BLD AUTO: 10.1 FL (ref 8.9–12.7)
PMV BLD AUTO: 10.4 FL (ref 8.9–12.7)
PMV BLD AUTO: 10.5 FL (ref 8.9–12.7)
PMV BLD AUTO: 10.6 FL (ref 8.9–12.7)
PMV BLD AUTO: 10.7 FL (ref 8.9–12.7)
PO2 BLD: 102 MM HG (ref 75–129)
PO2 BLDA: 105.3 MM HG (ref 75–129)
PO2 BLDA: 113.3 MM HG (ref 75–129)
PO2 BLDA: 117.5 MM HG (ref 75–129)
PO2 BLDA: 120.7 MM HG (ref 75–129)
PO2 BLDA: 146.2 MM HG (ref 75–129)
PO2 BLDV: 43.7 MM HG (ref 35–45)
PO2 BLDV: 80.4 MM HG (ref 35–45)
POIKILOCYTOSIS BLD QL SMEAR: PRESENT
POIKILOCYTOSIS BLD QL SMEAR: PRESENT
POLYCHROMASIA BLD QL SMEAR: PRESENT
POTASSIUM BLD-SCNC: 6.4 MMOL/L (ref 3.5–5.3)
POTASSIUM SERPL-SCNC: 4.9 MMOL/L (ref 3.5–5.3)
POTASSIUM SERPL-SCNC: 5.1 MMOL/L (ref 3.5–5.3)
POTASSIUM SERPL-SCNC: 5.3 MMOL/L (ref 3.5–5.3)
POTASSIUM SERPL-SCNC: 7.4 MMOL/L (ref 3.5–5.3)
POTASSIUM SERPL-SCNC: 7.4 MMOL/L (ref 3.5–5.3)
PROT SERPL-MCNC: 5 G/DL (ref 6.4–8.4)
PROT SERPL-MCNC: 7.5 G/DL (ref 6.4–8.4)
PROT UR STRIP-MCNC: ABNORMAL MG/DL
PROTHROMBIN TIME: 35.1 SECONDS (ref 11.6–14.5)
PROTHROMBIN TIME: >120 SECONDS (ref 11.6–14.5)
PROTHROMBIN TIME: >120 SECONDS (ref 11.6–14.5)
QRS AXIS: -22 DEGREES
QRSD INTERVAL: 84 MS
QT INTERVAL: 344 MS
QTC INTERVAL: 483 MS
RBC # BLD AUTO: 3.03 MILLION/UL (ref 3.81–5.12)
RBC # BLD AUTO: 3.11 MILLION/UL (ref 3.81–5.12)
RBC # BLD AUTO: 3.17 MILLION/UL (ref 3.81–5.12)
RBC # BLD AUTO: 3.3 MILLION/UL (ref 3.81–5.12)
RBC #/AREA URNS AUTO: ABNORMAL /HPF
RBC MORPH BLD: PRESENT
RH BLD: NEGATIVE
SALICYLATES SERPL-MCNC: <5 MG/DL (ref 3–20)
SAO2 % BLD FROM PO2: 94 % (ref 60–85)
SITE: NORMAL
SL CV LV EF: 55
SL CV PED ECHO LEFT VENTRICLE DIASTOLIC VOLUME (MOD BIPLANE) 2D: 48 ML
SL CV PED ECHO LEFT VENTRICLE SYSTOLIC VOLUME (MOD BIPLANE) 2D: 23 ML
SODIUM BLD-SCNC: 135 MMOL/L (ref 136–145)
SODIUM SERPL-SCNC: 132 MMOL/L (ref 135–147)
SODIUM SERPL-SCNC: 135 MMOL/L (ref 135–147)
SODIUM SERPL-SCNC: 141 MMOL/L (ref 135–147)
SODIUM SERPL-SCNC: 142 MMOL/L (ref 135–147)
SODIUM SERPL-SCNC: 143 MMOL/L (ref 135–147)
SP GR UR STRIP.AUTO: 1.01 (ref 1–1.03)
SPECIMEN EXPIRATION DATE: NORMAL
SPECIMEN SOURCE: ABNORMAL
T WAVE AXIS: 40 DEGREES
T4 FREE SERPL-MCNC: 1.25 NG/DL (ref 0.61–1.12)
TOTAL CELLS COUNTED SPEC: 100
TPA PPP QL CHRO: 32 % OF NORMAL (ref 77–138)
TSH SERPL DL<=0.05 MIU/L-ACNC: 5.56 UIU/ML (ref 0.45–4.5)
UROBILINOGEN UR STRIP-ACNC: <2 MG/DL
VANCOMYCIN SERPL-MCNC: 8.1 UG/ML (ref 10–20)
VENT AC: 20
VENT AC: 28
VENT AC: 28
VENT- AC: AC
VENTRICULAR RATE: 119 BPM
VT SETTING VENT: 400 ML
WBC # BLD AUTO: 17.14 THOUSAND/UL (ref 4.31–10.16)
WBC # BLD AUTO: 17.85 THOUSAND/UL (ref 4.31–10.16)
WBC # BLD AUTO: 35.8 THOUSAND/UL (ref 4.31–10.16)
WBC # BLD AUTO: 39.52 THOUSAND/UL (ref 4.31–10.16)
WBC # FLD MANUAL: 229 /UL
WBC #/AREA URNS AUTO: ABNORMAL /HPF
WBC CLUMPS # UR AUTO: PRESENT /UL

## 2023-01-01 PROCEDURE — 96365 THER/PROPH/DIAG IV INF INIT: CPT

## 2023-01-01 PROCEDURE — G1004 CDSM NDSC: HCPCS

## 2023-01-01 PROCEDURE — 85610 PROTHROMBIN TIME: CPT

## 2023-01-01 PROCEDURE — 93321 DOPPLER ECHO F-UP/LMTD STD: CPT | Performed by: INTERNAL MEDICINE

## 2023-01-01 PROCEDURE — 93321 DOPPLER ECHO F-UP/LMTD STD: CPT

## 2023-01-01 PROCEDURE — 99285 EMERGENCY DEPT VISIT HI MDM: CPT | Performed by: INTERNAL MEDICINE

## 2023-01-01 PROCEDURE — 83690 ASSAY OF LIPASE: CPT

## 2023-01-01 PROCEDURE — 85379 FIBRIN DEGRADATION QUANT: CPT

## 2023-01-01 PROCEDURE — 94644 CONT INHLJ TX 1ST HOUR: CPT

## 2023-01-01 PROCEDURE — 85007 BL SMEAR W/DIFF WBC COUNT: CPT

## 2023-01-01 PROCEDURE — 76705 ECHO EXAM OF ABDOMEN: CPT

## 2023-01-01 PROCEDURE — 49424 ASSESS CYST CONTRAST INJECT: CPT | Performed by: RADIOLOGY

## 2023-01-01 PROCEDURE — 94760 N-INVAS EAR/PLS OXIMETRY 1: CPT

## 2023-01-01 PROCEDURE — 85730 THROMBOPLASTIN TIME PARTIAL: CPT

## 2023-01-01 PROCEDURE — 84443 ASSAY THYROID STIM HORMONE: CPT | Performed by: PHARMACIST

## 2023-01-01 PROCEDURE — 36620 INSERTION CATHETER ARTERY: CPT

## 2023-01-01 PROCEDURE — 83615 LACTATE (LD) (LDH) ENZYME: CPT | Performed by: EMERGENCY MEDICINE

## 2023-01-01 PROCEDURE — 87070 CULTURE OTHR SPECIMN AEROBIC: CPT | Performed by: EMERGENCY MEDICINE

## 2023-01-01 PROCEDURE — 84100 ASSAY OF PHOSPHORUS: CPT

## 2023-01-01 PROCEDURE — 85362 FIBRIN DEGRADATION PRODUCTS: CPT

## 2023-01-01 PROCEDURE — 90945 DIALYSIS ONE EVALUATION: CPT | Performed by: INTERNAL MEDICINE

## 2023-01-01 PROCEDURE — 93308 TTE F-UP OR LMTD: CPT

## 2023-01-01 PROCEDURE — 99222 1ST HOSP IP/OBS MODERATE 55: CPT | Performed by: SURGERY

## 2023-01-01 PROCEDURE — 31500 INSERT EMERGENCY AIRWAY: CPT

## 2023-01-01 PROCEDURE — 83930 ASSAY OF BLOOD OSMOLALITY: CPT

## 2023-01-01 PROCEDURE — 83605 ASSAY OF LACTIC ACID: CPT

## 2023-01-01 PROCEDURE — 06HY33Z INSERTION OF INFUSION DEVICE INTO LOWER VEIN, PERCUTANEOUS APPROACH: ICD-10-PCS | Performed by: EMERGENCY MEDICINE

## 2023-01-01 PROCEDURE — 85420 FIBRINOLYTIC PLASMINOGEN: CPT

## 2023-01-01 PROCEDURE — 0BH17EZ INSERTION OF ENDOTRACHEAL AIRWAY INTO TRACHEA, VIA NATURAL OR ARTIFICIAL OPENING: ICD-10-PCS | Performed by: EMERGENCY MEDICINE

## 2023-01-01 PROCEDURE — 82077 ASSAY SPEC XCP UR&BREATH IA: CPT | Performed by: PHARMACIST

## 2023-01-01 PROCEDURE — 80143 DRUG ASSAY ACETAMINOPHEN: CPT | Performed by: PHARMACIST

## 2023-01-01 PROCEDURE — 4A133B1 MONITORING OF ARTERIAL PRESSURE, PERIPHERAL, PERCUTANEOUS APPROACH: ICD-10-PCS | Performed by: INTERNAL MEDICINE

## 2023-01-01 PROCEDURE — 82805 BLOOD GASES W/O2 SATURATION: CPT

## 2023-01-01 PROCEDURE — 85049 AUTOMATED PLATELET COUNT: CPT

## 2023-01-01 PROCEDURE — 80179 DRUG ASSAY SALICYLATE: CPT | Performed by: PHARMACIST

## 2023-01-01 PROCEDURE — 85014 HEMATOCRIT: CPT

## 2023-01-01 PROCEDURE — 93005 ELECTROCARDIOGRAM TRACING: CPT

## 2023-01-01 PROCEDURE — 82948 REAGENT STRIP/BLOOD GLUCOSE: CPT

## 2023-01-01 PROCEDURE — 99291 CRITICAL CARE FIRST HOUR: CPT

## 2023-01-01 PROCEDURE — 85397 CLOTTING FUNCT ACTIVITY: CPT

## 2023-01-01 PROCEDURE — 86900 BLOOD TYPING SEROLOGIC ABO: CPT

## 2023-01-01 PROCEDURE — 85347 COAGULATION TIME ACTIVATED: CPT

## 2023-01-01 PROCEDURE — 85576 BLOOD PLATELET AGGREGATION: CPT

## 2023-01-01 PROCEDURE — 90945 DIALYSIS ONE EVALUATION: CPT

## 2023-01-01 PROCEDURE — 83880 ASSAY OF NATRIURETIC PEPTIDE: CPT

## 2023-01-01 PROCEDURE — 0W9B3ZZ DRAINAGE OF LEFT PLEURAL CAVITY, PERCUTANEOUS APPROACH: ICD-10-PCS | Performed by: EMERGENCY MEDICINE

## 2023-01-01 PROCEDURE — 94150 VITAL CAPACITY TEST: CPT

## 2023-01-01 PROCEDURE — 96368 THER/DIAG CONCURRENT INF: CPT

## 2023-01-01 PROCEDURE — 84295 ASSAY OF SERUM SODIUM: CPT

## 2023-01-01 PROCEDURE — 71275 CT ANGIOGRAPHY CHEST: CPT

## 2023-01-01 PROCEDURE — 87147 CULTURE TYPE IMMUNOLOGIC: CPT

## 2023-01-01 PROCEDURE — 93010 ELECTROCARDIOGRAM REPORT: CPT | Performed by: INTERNAL MEDICINE

## 2023-01-01 PROCEDURE — 87086 URINE CULTURE/COLONY COUNT: CPT

## 2023-01-01 PROCEDURE — 87040 BLOOD CULTURE FOR BACTERIA: CPT

## 2023-01-01 PROCEDURE — 71045 X-RAY EXAM CHEST 1 VIEW: CPT

## 2023-01-01 PROCEDURE — 80048 BASIC METABOLIC PNL TOTAL CA: CPT

## 2023-01-01 PROCEDURE — 84132 ASSAY OF SERUM POTASSIUM: CPT

## 2023-01-01 PROCEDURE — 36415 COLL VENOUS BLD VENIPUNCTURE: CPT

## 2023-01-01 PROCEDURE — 99223 1ST HOSP IP/OBS HIGH 75: CPT | Performed by: INTERNAL MEDICINE

## 2023-01-01 PROCEDURE — 80053 COMPREHEN METABOLIC PANEL: CPT

## 2023-01-01 PROCEDURE — 85007 BL SMEAR W/DIFF WBC COUNT: CPT | Performed by: PHARMACIST

## 2023-01-01 PROCEDURE — 82330 ASSAY OF CALCIUM: CPT

## 2023-01-01 PROCEDURE — 85384 FIBRINOGEN ACTIVITY: CPT

## 2023-01-01 PROCEDURE — 85300 ANTITHROMBIN III ACTIVITY: CPT

## 2023-01-01 PROCEDURE — 89051 BODY FLUID CELL COUNT: CPT | Performed by: EMERGENCY MEDICINE

## 2023-01-01 PROCEDURE — 96375 TX/PRO/DX INJ NEW DRUG ADDON: CPT

## 2023-01-01 PROCEDURE — 99291 CRITICAL CARE FIRST HOUR: CPT | Performed by: INTERNAL MEDICINE

## 2023-01-01 PROCEDURE — 82947 ASSAY GLUCOSE BLOOD QUANT: CPT

## 2023-01-01 PROCEDURE — 4A133J1 MONITORING OF ARTERIAL PULSE, PERIPHERAL, PERCUTANEOUS APPROACH: ICD-10-PCS | Performed by: INTERNAL MEDICINE

## 2023-01-01 PROCEDURE — 03HY32Z INSERTION OF MONITORING DEVICE INTO UPPER ARTERY, PERCUTANEOUS APPROACH: ICD-10-PCS | Performed by: INTERNAL MEDICINE

## 2023-01-01 PROCEDURE — 85027 COMPLETE CBC AUTOMATED: CPT

## 2023-01-01 PROCEDURE — 93325 DOPPLER ECHO COLOR FLOW MAPG: CPT | Performed by: INTERNAL MEDICINE

## 2023-01-01 PROCEDURE — 80076 HEPATIC FUNCTION PANEL: CPT

## 2023-01-01 PROCEDURE — 87205 SMEAR GRAM STAIN: CPT | Performed by: EMERGENCY MEDICINE

## 2023-01-01 PROCEDURE — 94002 VENT MGMT INPAT INIT DAY: CPT

## 2023-01-01 PROCEDURE — 81001 URINALYSIS AUTO W/SCOPE: CPT

## 2023-01-01 PROCEDURE — 86850 RBC ANTIBODY SCREEN: CPT

## 2023-01-01 PROCEDURE — 92950 HEART/LUNG RESUSCITATION CPR: CPT

## 2023-01-01 PROCEDURE — 85027 COMPLETE CBC AUTOMATED: CPT | Performed by: PHARMACIST

## 2023-01-01 PROCEDURE — 84484 ASSAY OF TROPONIN QUANT: CPT

## 2023-01-01 PROCEDURE — 02HV33Z INSERTION OF INFUSION DEVICE INTO SUPERIOR VENA CAVA, PERCUTANEOUS APPROACH: ICD-10-PCS | Performed by: EMERGENCY MEDICINE

## 2023-01-01 PROCEDURE — 5A12012 PERFORMANCE OF CARDIAC OUTPUT, SINGLE, MANUAL: ICD-10-PCS | Performed by: INTERNAL MEDICINE

## 2023-01-01 PROCEDURE — 85025 COMPLETE CBC W/AUTO DIFF WBC: CPT

## 2023-01-01 PROCEDURE — 5A1D90Z PERFORMANCE OF URINARY FILTRATION, CONTINUOUS, GREATER THAN 18 HOURS PER DAY: ICD-10-PCS | Performed by: INTERNAL MEDICINE

## 2023-01-01 PROCEDURE — NC001 PR NO CHARGE: Performed by: PHARMACIST

## 2023-01-01 PROCEDURE — 5A1935Z RESPIRATORY VENTILATION, LESS THAN 24 CONSECUTIVE HOURS: ICD-10-PCS | Performed by: EMERGENCY MEDICINE

## 2023-01-01 PROCEDURE — 83735 ASSAY OF MAGNESIUM: CPT

## 2023-01-01 PROCEDURE — 82803 BLOOD GASES ANY COMBINATION: CPT

## 2023-01-01 PROCEDURE — 76080 X-RAY EXAM OF FISTULA: CPT

## 2023-01-01 PROCEDURE — 96366 THER/PROPH/DIAG IV INF ADDON: CPT

## 2023-01-01 PROCEDURE — 80202 ASSAY OF VANCOMYCIN: CPT | Performed by: INTERNAL MEDICINE

## 2023-01-01 PROCEDURE — 93308 TTE F-UP OR LMTD: CPT | Performed by: INTERNAL MEDICINE

## 2023-01-01 PROCEDURE — 86901 BLOOD TYPING SEROLOGIC RH(D): CPT

## 2023-01-01 PROCEDURE — 94664 DEMO&/EVAL PT USE INHALER: CPT

## 2023-01-01 PROCEDURE — 74177 CT ABD & PELVIS W/CONTRAST: CPT

## 2023-01-01 PROCEDURE — 84439 ASSAY OF FREE THYROXINE: CPT | Performed by: PHARMACIST

## 2023-01-01 PROCEDURE — 93325 DOPPLER ECHO COLOR FLOW MAPG: CPT

## 2023-01-01 PROCEDURE — 83935 ASSAY OF URINE OSMOLALITY: CPT

## 2023-01-01 PROCEDURE — 87154 CUL TYP ID BLD PTHGN 6+ TRGT: CPT

## 2023-01-01 PROCEDURE — C9113 INJ PANTOPRAZOLE SODIUM, VIA: HCPCS

## 2023-01-01 PROCEDURE — 49424 ASSESS CYST CONTRAST INJECT: CPT

## 2023-01-01 PROCEDURE — 82805 BLOOD GASES W/O2 SATURATION: CPT | Performed by: PHARMACIST

## 2023-01-01 PROCEDURE — 94003 VENT MGMT INPAT SUBQ DAY: CPT

## 2023-01-01 PROCEDURE — 76080 X-RAY EXAM OF FISTULA: CPT | Performed by: RADIOLOGY

## 2023-01-01 PROCEDURE — 96367 TX/PROPH/DG ADDL SEQ IV INF: CPT

## 2023-01-01 PROCEDURE — 36620 INSERTION CATHETER ARTERY: CPT | Performed by: INTERNAL MEDICINE

## 2023-01-01 RX ORDER — CALCIUM GLUCONATE 20 MG/ML
1 INJECTION, SOLUTION INTRAVENOUS ONCE
Status: COMPLETED | OUTPATIENT
Start: 2023-01-01 | End: 2023-01-01

## 2023-01-01 RX ORDER — BISACODYL 10 MG
10 SUPPOSITORY, RECTAL RECTAL DAILY PRN
Status: DISCONTINUED | OUTPATIENT
Start: 2023-01-01 | End: 2023-01-01

## 2023-01-01 RX ORDER — SODIUM CHLORIDE FOR INHALATION 0.9 %
3 VIAL, NEBULIZER (ML) INHALATION ONCE
Status: COMPLETED | OUTPATIENT
Start: 2023-01-01 | End: 2023-01-01

## 2023-01-01 RX ORDER — NYSTATIN 100000 [USP'U]/G
POWDER TOPICAL 2 TIMES DAILY
Status: DISCONTINUED | OUTPATIENT
Start: 2023-01-01 | End: 2023-01-01 | Stop reason: HOSPADM

## 2023-01-01 RX ORDER — CALCIUM GLUCONATE 20 MG/ML
2 INJECTION, SOLUTION INTRAVENOUS ONCE
Status: COMPLETED | OUTPATIENT
Start: 2023-01-01 | End: 2023-01-01

## 2023-01-01 RX ORDER — POLYETHYLENE GLYCOL 3350 17 G/17G
17 POWDER, FOR SOLUTION ORAL DAILY
Status: DISCONTINUED | OUTPATIENT
Start: 2023-01-01 | End: 2023-01-01

## 2023-01-01 RX ORDER — SENNOSIDES 8.6 MG
17.2 TABLET ORAL
Status: DISCONTINUED | OUTPATIENT
Start: 2023-01-01 | End: 2023-01-01

## 2023-01-01 RX ORDER — SODIUM CHLORIDE, SODIUM GLUCONATE, SODIUM ACETATE, POTASSIUM CHLORIDE, MAGNESIUM CHLORIDE, SODIUM PHOSPHATE, DIBASIC, AND POTASSIUM PHOSPHATE .53; .5; .37; .037; .03; .012; .00082 G/100ML; G/100ML; G/100ML; G/100ML; G/100ML; G/100ML; G/100ML
500 INJECTION, SOLUTION INTRAVENOUS ONCE
Status: COMPLETED | OUTPATIENT
Start: 2023-01-01 | End: 2023-01-01

## 2023-01-01 RX ORDER — DEXTROSE MONOHYDRATE 25 G/50ML
25 INJECTION, SOLUTION INTRAVENOUS ONCE
Status: COMPLETED | OUTPATIENT
Start: 2023-01-01 | End: 2023-01-01

## 2023-01-01 RX ORDER — FUROSEMIDE 10 MG/ML
80 INJECTION INTRAMUSCULAR; INTRAVENOUS ONCE
Status: DISCONTINUED | OUTPATIENT
Start: 2023-01-01 | End: 2023-01-01

## 2023-01-01 RX ORDER — PANTOPRAZOLE SODIUM 40 MG/10ML
40 INJECTION, POWDER, LYOPHILIZED, FOR SOLUTION INTRAVENOUS
Status: DISCONTINUED | OUTPATIENT
Start: 2023-01-01 | End: 2023-01-01 | Stop reason: HOSPADM

## 2023-01-01 RX ORDER — SODIUM BICARBONATE 84 MG/ML
INJECTION, SOLUTION INTRAVENOUS CODE/TRAUMA/SEDATION MEDICATION
Status: COMPLETED | OUTPATIENT
Start: 2023-01-01 | End: 2023-01-01

## 2023-01-01 RX ORDER — ATORVASTATIN CALCIUM 40 MG/1
40 TABLET, FILM COATED ORAL
Status: DISCONTINUED | OUTPATIENT
Start: 2023-01-01 | End: 2023-01-01

## 2023-01-01 RX ORDER — METOCLOPRAMIDE HYDROCHLORIDE 5 MG/ML
10 INJECTION INTRAMUSCULAR; INTRAVENOUS ONCE
Status: COMPLETED | OUTPATIENT
Start: 2023-01-01 | End: 2023-01-01

## 2023-01-01 RX ORDER — CALCIUM CHLORIDE 100 MG/ML
1 INJECTION INTRAVENOUS; INTRAVENTRICULAR ONCE
Status: COMPLETED | OUTPATIENT
Start: 2023-01-01 | End: 2023-01-01

## 2023-01-01 RX ORDER — FLUDROCORTISONE ACETATE 0.1 MG/1
0.1 TABLET ORAL DAILY
Status: DISCONTINUED | OUTPATIENT
Start: 2023-01-01 | End: 2023-01-01 | Stop reason: HOSPADM

## 2023-01-01 RX ORDER — METRONIDAZOLE 500 MG/100ML
500 INJECTION, SOLUTION INTRAVENOUS EVERY 8 HOURS
Status: DISCONTINUED | OUTPATIENT
Start: 2023-01-01 | End: 2023-01-01 | Stop reason: HOSPADM

## 2023-01-01 RX ORDER — FENTANYL CITRATE 50 UG/ML
50 INJECTION, SOLUTION INTRAMUSCULAR; INTRAVENOUS ONCE
Status: DISCONTINUED | OUTPATIENT
Start: 2023-01-01 | End: 2023-01-01 | Stop reason: HOSPADM

## 2023-01-01 RX ORDER — ALBUMIN, HUMAN INJ 5% 5 %
50 SOLUTION INTRAVENOUS ONCE
Status: COMPLETED | OUTPATIENT
Start: 2023-01-01 | End: 2023-01-01

## 2023-01-01 RX ORDER — ALBUTEROL SULFATE 90 UG/1
2 AEROSOL, METERED RESPIRATORY (INHALATION) EVERY 6 HOURS PRN
Status: DISCONTINUED | OUTPATIENT
Start: 2023-01-01 | End: 2023-01-01

## 2023-01-01 RX ORDER — SODIUM POLYSTYRENE SULFONATE 4.1 MEQ/G
30 POWDER, FOR SUSPENSION ORAL; RECTAL ONCE
Status: COMPLETED | OUTPATIENT
Start: 2023-01-01 | End: 2023-01-01

## 2023-01-01 RX ORDER — LIDOCAINE HYDROCHLORIDE AND EPINEPHRINE 10; 10 MG/ML; UG/ML
20 INJECTION, SOLUTION INFILTRATION; PERINEURAL ONCE
Status: COMPLETED | OUTPATIENT
Start: 2023-01-01 | End: 2023-01-01

## 2023-01-01 RX ORDER — DEXTROSE MONOHYDRATE 25 G/50ML
100 INJECTION, SOLUTION INTRAVENOUS ONCE
Status: COMPLETED | OUTPATIENT
Start: 2023-01-01 | End: 2023-01-01

## 2023-01-01 RX ORDER — MAGNESIUM SULFATE 500 MG/ML
VIAL (ML) INJECTION CODE/TRAUMA/SEDATION MEDICATION
Status: COMPLETED | OUTPATIENT
Start: 2023-01-01 | End: 2023-01-01

## 2023-01-01 RX ORDER — ALBUTEROL SULFATE 2.5 MG/3ML
10 SOLUTION RESPIRATORY (INHALATION) ONCE
Status: COMPLETED | OUTPATIENT
Start: 2023-01-01 | End: 2023-01-01

## 2023-01-01 RX ORDER — ETOMIDATE 2 MG/ML
30 INJECTION INTRAVENOUS ONCE
Status: COMPLETED | OUTPATIENT
Start: 2023-01-01 | End: 2023-01-01

## 2023-01-01 RX ORDER — LIDOCAINE HYDROCHLORIDE 10 MG/ML
10 INJECTION, SOLUTION EPIDURAL; INFILTRATION; INTRACAUDAL; PERINEURAL ONCE
Status: COMPLETED | OUTPATIENT
Start: 2023-01-01 | End: 2023-01-01

## 2023-01-01 RX ORDER — IPRATROPIUM BROMIDE AND ALBUTEROL SULFATE 2.5; .5 MG/3ML; MG/3ML
3 SOLUTION RESPIRATORY (INHALATION) EVERY 6 HOURS PRN
Status: DISCONTINUED | OUTPATIENT
Start: 2023-01-01 | End: 2023-01-01 | Stop reason: HOSPADM

## 2023-01-01 RX ORDER — CALCIUM CHLORIDE 100 MG/ML
SYRINGE (ML) INTRAVENOUS CODE/TRAUMA/SEDATION MEDICATION
Status: COMPLETED | OUTPATIENT
Start: 2023-01-01 | End: 2023-01-01

## 2023-01-01 RX ORDER — EPINEPHRINE 1 MG/ML
INJECTION, SOLUTION, CONCENTRATE INTRAVENOUS
Status: DISCONTINUED
Start: 2023-01-01 | End: 2023-01-01 | Stop reason: WASHOUT

## 2023-01-01 RX ORDER — PANTOPRAZOLE SODIUM 40 MG/1
40 TABLET, DELAYED RELEASE ORAL
Status: DISCONTINUED | OUTPATIENT
Start: 2023-01-01 | End: 2023-01-01

## 2023-01-01 RX ORDER — ROCURONIUM BROMIDE 10 MG/ML
150 INJECTION, SOLUTION INTRAVENOUS ONCE
Status: COMPLETED | OUTPATIENT
Start: 2023-01-01 | End: 2023-01-01

## 2023-01-01 RX ORDER — ONDANSETRON 2 MG/ML
4 INJECTION INTRAMUSCULAR; INTRAVENOUS ONCE
Status: COMPLETED | OUTPATIENT
Start: 2023-01-01 | End: 2023-01-01

## 2023-01-01 RX ORDER — MONTELUKAST SODIUM 10 MG/1
10 TABLET ORAL
Status: DISCONTINUED | OUTPATIENT
Start: 2023-01-01 | End: 2023-01-01

## 2023-01-01 RX ORDER — CALCIUM CHLORIDE 100 MG/ML
2 INJECTION INTRAVENOUS; INTRAVENTRICULAR ONCE
Status: COMPLETED | OUTPATIENT
Start: 2023-01-01 | End: 2023-01-01

## 2023-01-01 RX ORDER — INSULIN LISPRO 100 [IU]/ML
2-12 INJECTION, SOLUTION INTRAVENOUS; SUBCUTANEOUS EVERY 6 HOURS SCHEDULED
Status: DISCONTINUED | OUTPATIENT
Start: 2023-01-01 | End: 2023-01-01 | Stop reason: HOSPADM

## 2023-01-01 RX ORDER — FLUTICASONE PROPIONATE 50 MCG
1 SPRAY, SUSPENSION (ML) NASAL DAILY
Status: DISCONTINUED | OUTPATIENT
Start: 2023-01-01 | End: 2023-01-01

## 2023-01-01 RX ORDER — EPINEPHRINE IN SOD CHLOR,ISO 1 MG/10 ML
SYRINGE (ML) INTRAVENOUS CODE/TRAUMA/SEDATION MEDICATION
Status: COMPLETED | OUTPATIENT
Start: 2023-01-01 | End: 2023-01-01

## 2023-01-01 RX ORDER — CHLORHEXIDINE GLUCONATE 0.12 MG/ML
15 RINSE ORAL EVERY 12 HOURS SCHEDULED
Status: DISCONTINUED | OUTPATIENT
Start: 2023-01-01 | End: 2023-01-01

## 2023-01-01 RX ORDER — PROPOFOL 10 MG/ML
5-50 INJECTION, EMULSION INTRAVENOUS
Status: DISCONTINUED | OUTPATIENT
Start: 2023-01-01 | End: 2023-01-01

## 2023-01-01 RX ORDER — CHLORHEXIDINE GLUCONATE 0.12 MG/ML
15 RINSE ORAL EVERY 12 HOURS SCHEDULED
Status: DISCONTINUED | OUTPATIENT
Start: 2023-01-01 | End: 2023-01-01 | Stop reason: HOSPADM

## 2023-01-01 RX ADMIN — PROPOFOL 10 MCG/KG/MIN: 10 INJECTION, EMULSION INTRAVENOUS at 19:47

## 2023-01-01 RX ADMIN — AMIODARONE HYDROCHLORIDE 150 MG: 50 INJECTION, SOLUTION INTRAVENOUS at 19:29

## 2023-01-01 RX ADMIN — SODIUM BICARBONATE 50 MEQ: 84 INJECTION INTRAVENOUS at 01:07

## 2023-01-01 RX ADMIN — INSULIN HUMAN 10 UNITS: 100 INJECTION, SOLUTION PARENTERAL at 01:07

## 2023-01-01 RX ADMIN — CHLORHEXIDINE GLUCONATE 15 ML: 1.2 SOLUTION ORAL at 23:11

## 2023-01-01 RX ADMIN — ALBUTEROL SULFATE 10 MG: 2.5 SOLUTION RESPIRATORY (INHALATION) at 17:44

## 2023-01-01 RX ADMIN — SODIUM BICARBONATE 100 MEQ: 84 INJECTION, SOLUTION INTRAVENOUS at 04:43

## 2023-01-01 RX ADMIN — SODIUM BICARBONATE 100 ML/HR: 84 INJECTION, SOLUTION INTRAVENOUS at 05:22

## 2023-01-01 RX ADMIN — ISODIUM CHLORIDE 3 ML: 0.03 SOLUTION RESPIRATORY (INHALATION) at 17:44

## 2023-01-01 RX ADMIN — EPINEPHRINE 1 MG: 0.1 INJECTION INTRAVENOUS at 00:07

## 2023-01-01 RX ADMIN — PANTOPRAZOLE SODIUM 40 MG: 40 INJECTION, POWDER, FOR SOLUTION INTRAVENOUS at 10:32

## 2023-01-01 RX ADMIN — MAGNESIUM SULFATE HEPTAHYDRATE 2 G: 500 INJECTION, SOLUTION INTRAMUSCULAR; INTRAVENOUS at 00:07

## 2023-01-01 RX ADMIN — SODIUM BICARBONATE 50 MEQ: 84 INJECTION INTRAVENOUS at 07:12

## 2023-01-01 RX ADMIN — LIDOCAINE HYDROCHLORIDE AND EPINEPHRINE 20 ML: 10; 10 INJECTION, SOLUTION INFILTRATION; PERINEURAL at 17:18

## 2023-01-01 RX ADMIN — Medication 20000 ML: at 22:40

## 2023-01-01 RX ADMIN — MONTELUKAST 10 MG: 10 TABLET, FILM COATED ORAL at 23:11

## 2023-01-01 RX ADMIN — Medication 20000 ML: at 06:23

## 2023-01-01 RX ADMIN — SODIUM BICARBONATE 50 MEQ: 84 INJECTION INTRAVENOUS at 23:12

## 2023-01-01 RX ADMIN — AMIODARONE HYDROCHLORIDE 0.5 MG/MIN: 50 INJECTION, SOLUTION INTRAVENOUS at 10:37

## 2023-01-01 RX ADMIN — PANTOPRAZOLE SODIUM 40 MG: 40 TABLET, DELAYED RELEASE ORAL at 05:39

## 2023-01-01 RX ADMIN — AMIODARONE HYDROCHLORIDE 0.5 MG/MIN: 50 INJECTION, SOLUTION INTRAVENOUS at 01:25

## 2023-01-01 RX ADMIN — NYSTATIN: 100000 POWDER TOPICAL at 02:50

## 2023-01-01 RX ADMIN — VASOPRESSIN 0.04 UNITS/MIN: 20 INJECTION INTRAVENOUS at 18:58

## 2023-01-01 RX ADMIN — ROCURONIUM BROMIDE 150 MG: 10 INJECTION, SOLUTION INTRAVENOUS at 18:36

## 2023-01-01 RX ADMIN — HYDROCORTISONE SODIUM SUCCINATE 50 MG: 100 INJECTION, POWDER, FOR SOLUTION INTRAMUSCULAR; INTRAVENOUS at 05:39

## 2023-01-01 RX ADMIN — SODIUM CHLORIDE, SODIUM LACTATE, POTASSIUM CHLORIDE, AND CALCIUM CHLORIDE 3000 ML: .6; .31; .03; .02 INJECTION, SOLUTION INTRAVENOUS at 18:00

## 2023-01-01 RX ADMIN — SODIUM BICARBONATE 50 MEQ: 84 INJECTION, SOLUTION INTRAVENOUS at 22:39

## 2023-01-01 RX ADMIN — PHENYLEPHRINE HYDROCHLORIDE 25 MCG/MIN: 10 INJECTION INTRAVENOUS at 07:01

## 2023-01-01 RX ADMIN — ALBUMIN (HUMAN) 50 G: 12.5 INJECTION, SOLUTION INTRAVENOUS at 02:33

## 2023-01-01 RX ADMIN — SODIUM CHLORIDE, SODIUM GLUCONATE, SODIUM ACETATE, POTASSIUM CHLORIDE, MAGNESIUM CHLORIDE, SODIUM PHOSPHATE, DIBASIC, AND POTASSIUM PHOSPHATE 500 ML: .53; .5; .37; .037; .03; .012; .00082 INJECTION, SOLUTION INTRAVENOUS at 02:35

## 2023-01-01 RX ADMIN — MICAFUNGIN SODIUM 100 MG: 50 INJECTION, POWDER, LYOPHILIZED, FOR SOLUTION INTRAVENOUS at 07:27

## 2023-01-01 RX ADMIN — APIXABAN 5 MG: 5 TABLET, FILM COATED ORAL at 23:11

## 2023-01-01 RX ADMIN — NOREPINEPHRINE BITARTRATE 30 MCG/MIN: 1 INJECTION, SOLUTION, CONCENTRATE INTRAVENOUS at 01:13

## 2023-01-01 RX ADMIN — CEFEPIME 2000 MG: 2 INJECTION, POWDER, FOR SOLUTION INTRAVENOUS at 05:38

## 2023-01-01 RX ADMIN — METRONIDAZOLE 500 MG: 500 INJECTION, SOLUTION INTRAVENOUS at 09:45

## 2023-01-01 RX ADMIN — SODIUM BICARBONATE 50 MEQ: 84 INJECTION INTRAVENOUS at 10:30

## 2023-01-01 RX ADMIN — DEXTROSE MONOHYDRATE 25 ML: 25 INJECTION, SOLUTION INTRAVENOUS at 01:07

## 2023-01-01 RX ADMIN — VASOPRESSIN 0.04 UNITS/MIN: 20 INJECTION, SOLUTION INTRAMUSCULAR; SUBCUTANEOUS at 22:18

## 2023-01-01 RX ADMIN — EPINEPHRINE 4 MCG/MIN: 1 INJECTION, SOLUTION, CONCENTRATE INTRAVENOUS at 00:10

## 2023-01-01 RX ADMIN — SODIUM POLYSTYRENE SULFONATE 30 G: 4.1 POWDER, FOR SUSPENSION ORAL; RECTAL at 02:35

## 2023-01-01 RX ADMIN — SODIUM BICARBONATE 100 MEQ: 84 INJECTION INTRAVENOUS at 01:20

## 2023-01-01 RX ADMIN — VANCOMYCIN HYDROCHLORIDE 2000 MG: 10 INJECTION, POWDER, LYOPHILIZED, FOR SOLUTION INTRAVENOUS at 01:22

## 2023-01-01 RX ADMIN — METOCLOPRAMIDE 10 MG: 5 INJECTION, SOLUTION INTRAMUSCULAR; INTRAVENOUS at 16:39

## 2023-01-01 RX ADMIN — Medication 2000 MG: at 17:00

## 2023-01-01 RX ADMIN — SODIUM BICARBONATE 50 MEQ: 84 INJECTION, SOLUTION INTRAVENOUS at 00:04

## 2023-01-01 RX ADMIN — LIDOCAINE HYDROCHLORIDE 10 ML: 10 INJECTION, SOLUTION EPIDURAL; INFILTRATION; INTRACAUDAL; PERINEURAL at 17:45

## 2023-01-01 RX ADMIN — NOREPINEPHRINE BITARTRATE 5 MCG/MIN: 1 INJECTION, SOLUTION, CONCENTRATE INTRAVENOUS at 18:16

## 2023-01-01 RX ADMIN — ETOMIDATE 30 MG: 2 INJECTION, SOLUTION INTRAVENOUS at 18:35

## 2023-01-01 RX ADMIN — DEXTROSE MONOHYDRATE 25 ML: 25 INJECTION, SOLUTION INTRAVENOUS at 18:00

## 2023-01-01 RX ADMIN — METRONIDAZOLE 500 MG: 500 INJECTION, SOLUTION INTRAVENOUS at 01:50

## 2023-01-01 RX ADMIN — EPINEPHRINE 1 MG: 0.1 INJECTION INTRAVENOUS at 00:04

## 2023-01-01 RX ADMIN — CALCIUM CHLORIDE 1 G: 100 INJECTION INTRAVENOUS; INTRAVENTRICULAR at 07:06

## 2023-01-01 RX ADMIN — IOHEXOL 100 ML: 350 INJECTION, SOLUTION INTRAVENOUS at 21:46

## 2023-01-01 RX ADMIN — HYDROCORTISONE SODIUM SUCCINATE 50 MG: 100 INJECTION, POWDER, FOR SOLUTION INTRAMUSCULAR; INTRAVENOUS at 10:32

## 2023-01-01 RX ADMIN — NOREPINEPHRINE BITARTRATE 27 MCG/MIN: 1 INJECTION, SOLUTION, CONCENTRATE INTRAVENOUS at 21:38

## 2023-01-01 RX ADMIN — CALCIUM CHLORIDE 2 G: 100 INJECTION INTRAVENOUS; INTRAVENTRICULAR at 01:21

## 2023-01-01 RX ADMIN — SENNOSIDES 17.2 MG: 8.6 TABLET, FILM COATED ORAL at 23:11

## 2023-01-01 RX ADMIN — CHLORHEXIDINE GLUCONATE 15 ML: 1.2 SOLUTION ORAL at 09:49

## 2023-01-01 RX ADMIN — CALCIUM CHLORIDE 1 G: 100 INJECTION PARENTERAL at 00:04

## 2023-01-01 RX ADMIN — HYDROCORTISONE SODIUM SUCCINATE 200 MG: 100 INJECTION, POWDER, FOR SOLUTION INTRAMUSCULAR; INTRAVENOUS at 19:10

## 2023-01-01 RX ADMIN — NOREPINEPHRINE BITARTRATE 27 MCG/MIN: 1 INJECTION, SOLUTION, CONCENTRATE INTRAVENOUS at 22:18

## 2023-01-01 RX ADMIN — EPINEPHRINE 20 MCG/MIN: 1 INJECTION, SOLUTION, CONCENTRATE INTRAVENOUS at 05:23

## 2023-01-01 RX ADMIN — NYSTATIN 1 APPLICATION: 100000 POWDER TOPICAL at 09:48

## 2023-01-01 RX ADMIN — ALBUTEROL SULFATE 10 MG: 2.5 SOLUTION RESPIRATORY (INHALATION) at 00:32

## 2023-01-01 RX ADMIN — METHYLENE BLUE 150 MG: 5 INJECTION INTRAVENOUS at 09:30

## 2023-01-01 RX ADMIN — HYDROCORTISONE SODIUM SUCCINATE 50 MG: 100 INJECTION, POWDER, FOR SOLUTION INTRAMUSCULAR; INTRAVENOUS at 01:20

## 2023-01-01 RX ADMIN — Medication 50 MEQ: at 23:12

## 2023-01-01 RX ADMIN — DEXTROSE MONOHYDRATE 100 ML: 25 INJECTION, SOLUTION INTRAVENOUS at 01:21

## 2023-01-01 RX ADMIN — SODIUM BICARBONATE 100 MEQ: 84 INJECTION, SOLUTION INTRAVENOUS at 06:03

## 2023-01-01 RX ADMIN — AMIODARONE HYDROCHLORIDE 1 MG/MIN: 50 INJECTION, SOLUTION INTRAVENOUS at 20:10

## 2023-01-01 RX ADMIN — POLYETHYLENE GLYCOL 3350 17 G: 17 POWDER, FOR SOLUTION ORAL at 09:48

## 2023-01-01 RX ADMIN — CALCIUM GLUCONATE 1 G: 20 INJECTION, SOLUTION INTRAVENOUS at 18:00

## 2023-01-01 RX ADMIN — ONDANSETRON 4 MG: 2 INJECTION INTRAMUSCULAR; INTRAVENOUS at 16:23

## 2023-01-01 RX ADMIN — EPINEPHRINE 20 MCG/MIN: 1 INJECTION, SOLUTION, CONCENTRATE INTRAVENOUS at 09:14

## 2023-01-01 RX ADMIN — INSULIN HUMAN 10 UNITS: 100 INJECTION, SOLUTION PARENTERAL at 18:00

## 2023-01-03 ENCOUNTER — OFFICE VISIT (OUTPATIENT)
Dept: WOUND CARE | Facility: HOSPITAL | Age: 73
End: 2023-01-03

## 2023-01-03 ENCOUNTER — HOSPITAL ENCOUNTER (OUTPATIENT)
Dept: RADIOLOGY | Facility: HOSPITAL | Age: 73
Discharge: HOME | End: 2023-01-03
Attending: INTERNAL MEDICINE

## 2023-01-03 VITALS
HEART RATE: 73 BPM | SYSTOLIC BLOOD PRESSURE: 133 MMHG | RESPIRATION RATE: 15 BRPM | TEMPERATURE: 97.1 F | DIASTOLIC BLOOD PRESSURE: 82 MMHG

## 2023-01-03 VITALS — BODY MASS INDEX: 46.01 KG/M2 | WEIGHT: 250 LBS | HEIGHT: 62 IN

## 2023-01-03 DIAGNOSIS — E11.621 TYPE 2 DIABETES MELLITUS WITH FOOT ULCER (CODE) (HCC): ICD-10-CM

## 2023-01-03 DIAGNOSIS — E78.2 MIXED HYPERLIPIDEMIA: ICD-10-CM

## 2023-01-03 DIAGNOSIS — L89.322 PRESSURE ULCER OF LEFT BUTTOCK, STAGE 2 (HCC): Primary | ICD-10-CM

## 2023-01-03 DIAGNOSIS — M85.812 OTHER SPECIFIED DISORDERS OF BONE DENSITY AND STRUCTURE, LEFT SHOULDER: ICD-10-CM

## 2023-01-03 DIAGNOSIS — S42.295D OTHER CLOSED NONDISPLACED FRACTURE OF PROXIMAL END OF LEFT HUMERUS WITH ROUTINE HEALING, SUBSEQUENT ENCOUNTER: ICD-10-CM

## 2023-01-03 DIAGNOSIS — E11.00 TYPE 2 DIABETES MELLITUS WITH HYPEROSMOLARITY WITHOUT COMA, WITHOUT LONG-TERM CURRENT USE OF INSULIN (HCC): ICD-10-CM

## 2023-01-03 DIAGNOSIS — L89.312 PRESSURE ULCER OF RIGHT BUTTOCK, STAGE 2 (HCC): ICD-10-CM

## 2023-01-03 RX ORDER — ROSUVASTATIN CALCIUM 10 MG/1
TABLET, COATED ORAL
Qty: 90 TABLET | Refills: 1 | Status: SHIPPED | OUTPATIENT
Start: 2023-01-03

## 2023-01-03 NOTE — PATIENT INSTRUCTIONS
Orders Placed This Encounter   Procedures    Wound cleansing and dressings     Right and Left Buttock wounds are healed  Shower: Yes    Recommend applying Hydroguard cream daily to buttocks     Continue to try to keep pressure off buttocks  Standing Status:   Future     Standing Expiration Date:   1/3/2024    Wound cleansing and dressings     Right Heel Blister:     Wash your hands with soap and water  Remove old dressing, discard into plastic bag and place in trash  Cleanse the wound with soap and water prior to applying a clean dressing  Do not use tissue or cotton balls  Do not scrub the wound  Pat dry using gauze  Shower yes   Apply moisturizer to skin surrounding wound  Apply betadine to the heel wound daily  Put wedge that you have from the hospital under leg to elevate heel off the bed  Standing Status:   Future     Standing Expiration Date:   1/3/2024    Wound off loading     Off-loading Instructions:     Keep weight and pressure off wound at all times  , Use your foam cushion to sit as you have been  (Do not use donut-type devices)  Seat lifts or shift position in chair every 15 minutes  , Turn every 2 hours  Avoid position directing pressure to Wound site  Limit side lying to 30 degree tilt  Limit the head of bed elevation to 30 degrees  and Do Not Sit for Long Periods of Time  Use your hospital wedge to elevate right heel off of bed  Standing Status:   Future     Standing Expiration Date:   1/3/2024    Wound miscellaneous orders     Have xray of right heel done as ordered  Continue to eat increased protein three times per day       Standing Status:   Future     Standing Expiration Date:   1/3/2024

## 2023-01-03 NOTE — PROGRESS NOTES
Patient ID: Padmini Ross is a 81 y o  female Date of Birth 1950     Chief Complaint  Chief Complaint   Patient presents with   • Follow Up Wound Care Visit     Right and left buttocks, right foot       Allergies  Penicillins, Moxifloxacin, Percocet [oxycodone-acetaminophen], Zinc acetate, Asa [aspirin], Indocin [indomethacin], and Other    Assessment:     Diagnoses and all orders for this visit:    Pressure ulcer of left buttock, stage 2 (HCC)  -     Wound cleansing and dressings; Future  -     Wound cleansing and dressings; Future  -     Wound off loading; Future  -     Wound miscellaneous orders; Future    Pressure ulcer of right buttock, stage 2 (HCC)  -     Wound cleansing and dressings; Future  -     Wound cleansing and dressings; Future  -     Wound off loading; Future  -     Wound miscellaneous orders; Future    Type 2 diabetes mellitus with foot ulcer (CODE) (HCC)  -     Wound cleansing and dressings; Future  -     Wound cleansing and dressings; Future  -     Wound off loading; Future  -     Wound miscellaneous orders; Future  -     XR heel / calcaneus 2+ vw right; Future          Plan:  1  F/U visit  Bilateral buttock wounds epithelialized  Counseled patient and daughter to apply Hydro guard cream to sacrum and buttocks twice daily and as needed  Continue frequent offloading of pressure  2   Right heel wound has evolved from a dark purple discoloration to a black stable eschar  We will change treatment to Betadine to wound daily  Continue frequent offloading of pressure from right heel  We will also obtain x-ray of right heel to rule out osteomyelitis  3   Patient will follow up in 2 weeks       Wound 12/20/22 Pressure Injury Heel Right (Active)   Wound Image Images linked 01/03/23 1024   Wound Description Other (Comment) (purple) 01/03/23 1024   Marlene-wound Assessment Dry 01/03/23 1024   Wound Length (cm) 3 cm (blister on top of blister 0 8x0 8; area tender) 01/03/23 1024   Wound Width (cm) 4 cm 01/03/23 1024   Wound Depth (cm) 0 cm 01/03/23 1024   Wound Surface Area (cm^2) 12 cm^2 01/03/23 1024   Wound Volume (cm^3) 0 cm^3 01/03/23 1024   Calculated Wound Volume (cm^3) 0 cm^3 01/03/23 1024   Drainage Amount None 01/03/23 1024   Non-staged Wound Description Full thickness 01/03/23 1024   Dressing Status Other (Comment) (no dressing upon arrival) 01/03/23 1024       Wound 12/20/22 Pressure Injury Buttocks Left (Active)   Wound Image Images linked 01/03/23 1023   Wound Description Eschar 01/03/23 1022   Pressure Injury Stage 2 01/03/23 1022   Marlene-wound Assessment Purple;Dry 01/03/23 1022   Wound Length (cm) 0 cm 01/03/23 1022   Wound Width (cm) 0 cm 01/03/23 1022   Wound Depth (cm) 0 cm 01/03/23 1022   Wound Surface Area (cm^2) 0 cm^2 01/03/23 1022   Wound Volume (cm^3) 0 cm^3 01/03/23 1022   Calculated Wound Volume (cm^3) 0 cm^3 01/03/23 1022   Change in Wound Size % 100 01/03/23 1022   Drainage Amount None 01/03/23 1022   Non-staged Wound Description Not applicable 33/67/26 2196   Dressing Status Other (Comment) (no dressing upon arrival) 01/03/23 1022       Wound 12/20/22 Buttocks Right (Active)   Wound Image Images linked 01/03/23 1021   Wound Description Eschar 01/03/23 1016   Marlene-wound Assessment Purple;Dry 01/03/23 1016   Wound Length (cm) 0 cm 01/03/23 1016   Wound Width (cm) 0 cm 01/03/23 1016   Wound Depth (cm) 0 cm 01/03/23 1016   Wound Surface Area (cm^2) 0 cm^2 01/03/23 1016   Wound Volume (cm^3) 0 cm^3 01/03/23 1016   Calculated Wound Volume (cm^3) 0 cm^3 01/03/23 1016   Change in Wound Size % 100 01/03/23 1016   Drainage Amount None 01/03/23 1016   Non-staged Wound Description Not applicable 18/14/92 5088   Dressing Status Other (Comment) (no dressing upon arrival) 01/03/23 1016       Wound 12/20/22 Pressure Injury Heel Right (Active)   Date First Assessed/Time First Assessed: 12/20/22 1042   Primary Wound Type: Pressure Injury  Location: Heel  Wound Location Orientation: Right  Wound Description (Comments): Stage 1       Wound 12/20/22 Pressure Injury Buttocks Left (Active)   Date First Assessed/Time First Assessed: 12/20/22 1048   Primary Wound Type: Pressure Injury  Location: Buttocks  Wound Location Orientation: Left  Wound Description (Comments): stage 2  Wound Outcome: Healed       Wound 12/20/22 Buttocks Right (Active)   Date First Assessed/Time First Assessed: 12/20/22 1048   Location: Buttocks  Wound Location Orientation: Right  Wound Description (Comments): stage 2  Wound Outcome: Healed       [REMOVED] Wound 06/10/22 Cellulitis Arm Right (Removed)   Resolved Date: 12/20/22  Date First Assessed/Time First Assessed: 06/10/22 1257   Pre-Existing Wound: No  Traumatic Wound Type: Cellulitis  Location: Arm  Wound Location Orientation: Right  Dressing Status: Open to air  Wound Outcome: (c) Other (Comment)       [REMOVED] Wound 11/02/22 Pressure Injury Buttocks Right;Left (Removed)   Resolved Date: 12/20/22  Date First Assessed/Time First Assessed: 11/02/22 2100   Pre-Existing Wound: (c) Yes  Primary Wound Type: Pressure Injury  Location: Buttocks  Wound Location Orientation: Right;Left  Wound Description (Comments): stage 2 press    Subjective:     S/you visit for pressure injuries of her bilateral buttocks and right heel  Patient reports pain in right heel  She has been offloading pressure from both her buttocks and right heel in bed frequently  She denies any fevers or chills  The following portions of the patient's history were reviewed and updated as appropriate:   She  has a past medical history of Anemia, Asthma, Chronic kidney disease, COPD (chronic obstructive pulmonary disease) ([address] ), COVID-19, Diabetes mellitus ([address] ), GERD (gastroesophageal reflux disease), Hyperlipidemia, Hypertension, PONV (postoperative nausea and vomiting), and SVT (supraventricular tachycardia) ([facility] [address] )    She   Patient Active Problem List    Diagnosis Date Noted   • Chronic obstructive pulmonary disease, unspecified COPD type ([place] [address] ) 12/30/2022   • Heel blister 12/12/2022   • Blister of right heel 12/06/2022   • Impaired mobility and ADLs 11/08/2022   • Pressure injury of sacral region, stage 2 ([place] [address] ) 11/08/2022   • Rash 08/19/2022   • PSVT (paroxysmal supraventricular tachycardia) ([place] [address] ) 07/17/2022   • Chronic respiratory failure with hypoxia and hypercapnia ([facility]) 06/14/2022   • Platelets decreased ([place] [address] ) 06/14/2022   • Elevated serum creatinine 06/08/2022   • KATRINA (obstructive sleep apnea) 06/04/2022   • Obesity hypoventilation syndrome ([place] [address] ) 05/17/2022   • Morbid obesity ([place] [address] ) 04/22/2022   • Lump of left breast 04/08/2022   • Pressure injury of deep tissue of sacral region 04/08/2022   • Pulmonary nodules 03/30/2022   • Chronic diastolic heart failure ([place] [address] ) 03/29/2022   • Peripheral venous insufficiency 03/25/2022   • Post-herpetic polyneuropathy 03/25/2022   • Raynaud's disease 03/25/2022   • Lung nodule 03/25/2022   • Stage 3 chronic kidney disease ([place] [address] )    • Status post reverse total replacement of left shoulder 03/09/2022   • PONV (postoperative nausea and vomiting) 02/28/2022   • Diabetes mellitus, type 2 ([place] [address] ) 02/28/2022   • Gastroesophageal reflux disease 02/28/2022   • Nephrolithiasis 02/28/2022   • Arthritis 02/28/2022   • S/P total knee replacement, right 02/28/2022   • On continuous oral anticoagulation - eliquis 02/28/2022   • Essential hypertension 08/01/2019   • Anemia 07/31/2019   • Mixed hyperlipidemia 06/25/2019   • Paroxysmal atrial fibrillation ([place] [address] ) 05/28/2019   • Lower leg edema 05/27/2019   • Asthma 03/08/2018   • Morbid obesity with BMI of 45 0-49 9, adult ([place] [address] ) 03/08/2018     She  has a past surgical history that includes Joint replacement; Appendectomy; Knee Arthroplasty (Right); Shoulder arthrotomy (Left); Dilation and curettage of uterus;  Cystoscopy w/ laser lithotripsy (Left, 7/12/2016); pr cysto bladder w/ureteral catheterization (Left, 6/29/2016); pr arthroplasty glenohumeral joint total shoulder (Left, 3/1/2022); and IR thoracentesis (3/18/2022)  Her family history includes Diabetes in her father; Emphysema in her maternal grandmother; Heart disease in her family and mother  She  reports that she quit smoking about 26 years ago  Her smoking use included cigarettes  She has a 20 00 pack-year smoking history  She has never used smokeless tobacco  She reports that she does not currently use alcohol  She reports that she does not use drugs  Current Outpatient Medications   Medication Sig Dispense Refill   • acetaminophen (TYLENOL) 325 mg tablet Take 650 mg by mouth every 6 (six) hours as needed for mild pain       • albuterol (PROVENTIL HFA,VENTOLIN HFA) 90 mcg/act inhaler Inhale 2 puffs every 6 (six) hours as needed for wheezing 8 g 4   • ammonium lactate (LAC-HYDRIN) 12 % lotion APPLY TOPICALLY TO AFFECTED AREAS twice a day     • apixaban (ELIQUIS) 5 mg Take 1 tablet (5 mg total) by mouth in the morning and 1 tablet (5 mg total) in the evening  180 tablet 1   • B Complex-C-Folic Acid (triphrocaps) 1 MG CAPS take 1 capsule by mouth once daily 30 capsule 5   • docusate sodium (COLACE) 100 mg capsule Take 100 mg by mouth in the morning     • fluticasone (FLONASE) 50 mcg/act nasal spray 1 spray into each nostril daily  0   • glucose blood test strip Use 1 each 2 (two) times a day Use one 2 times daily 200 strip 5   • insulin lispro (HumaLOG KwikPen) 100 units/mL injection pen 3 times with meal according to sliding scale - >Blood Glucose 150 - 199: 2 Unit of Insulin, Blood Glucose 200 - 249: 4 Units of Insulin, Blood Glucose 250 - 299: 6 Units of Insulin, Blood Glucose 300 - 349: 8 Units of Insulin, Blood Glucose 350 - 399: 10 Units of [place]lin,Blood Glucose greater than or equal to 400: 12 Units of Insulin-please contact your physician 15 mL 0   • Insulin Pen Needle (BD Pen Needle Pilar 2nd Gen) 32G X 4 MM MISC For use with insulin pen  Pharmacy may dispense brand covered by insurance   100 each 0   • Insulin Pen Needle (NovoFine Autocover) 30G X 8 MM MISC Inject under the skin daily 100 each 3   • Insulin Pen Needle 30G X 8 MM MISC 2 (two) times a day     • Insulin Pen Needle 31G X 8 MM MISC Use daily Inject under the skin 100 each 2   • Lancets (onetouch ultrasoft) lancets Use once daily 100 each 2   • metoprolol succinate (TOPROL-XL) 50 mg 24 hr tablet Take 1 tablet (50 mg total) by mouth daily 30 tablet 3   • montelukast (SINGULAIR) 10 mg tablet Take 10 mg by mouth daily       • pregabalin (LYRICA) 100 mg capsule take 1 capsule by mouth twice a day 180 capsule 1   • rosuvastatin (CRESTOR) 10 MG tablet take 1 tablet by mouth once daily 90 tablet 1   • torsemide (DEMADEX) 20 mg tablet Take 2 tablets in the AM 90 tablet 1   • Toujeo SoloStar 300 units/mL CONCENTRATED U-300 injection pen (1-unit dial) Inject 35 Units under the skin daily at bedtime 4 5 mL 3     No current facility-administered medications for this visit  She is allergic to penicillins, moxifloxacin, percocet [oxycodone-acetaminophen], zinc acetate, asa [aspirin], indocin [indomethacin], and other       Review of Systems   Constitutional: Negative  HENT: Negative for ear pain and hearing loss  Eyes: Negative for pain  Respiratory: Negative for chest tightness and shortness of breath  Cardiovascular: Negative for chest pain, palpitations and leg swelling  Gastrointestinal: Negative for diarrhea, nausea and vomiting  Genitourinary: Negative for dysuria  Musculoskeletal: Positive for gait problem  Skin: Positive for wound  Neurological: Negative for tremors and weakness  Psychiatric/Behavioral: Negative for behavioral problems, confusion and suicidal ideas           Objective:       Wound 12/20/22 Pressure Injury Heel Right (Active)   Wound Image Images linked 01/03/23 1024   Wound Description Other (Comment) (purple) 01/03/23 1024   Marlene-wound Assessment Dry 01/03/23 1024   Wound Length (cm) 3 cm (blister on top of blister 0 8x0 8; area tender) 01/03/23 1024   Wound Width (cm) 4 cm 01/03/23 1024   Wound Depth (cm) 0 cm 01/03/23 1024   Wound Surface Area (cm^2) 12 cm^2 01/03/23 1024   Wound Volume (cm^3) 0 cm^3 01/03/23 1024   Calculated Wound Volume (cm^3) 0 cm^3 01/03/23 1024   Drainage Amount None 01/03/23 1024   Non-staged Wound Description Full thickness 01/03/23 1024   Dressing Status Other (Comment) (no dressing upon arrival) 01/03/23 1024       Wound 12/20/22 Pressure Injury Buttocks Left (Active)   Wound Image Images linked 01/03/23 1023   Wound Description Eschar 01/03/23 1022   Pressure Injury Stage 2 01/03/23 1022   Marlene-wound Assessment Purple;Dry 01/03/23 1022   Wound Length (cm) 0 cm 01/03/23 1022   Wound Width (cm) 0 cm 01/03/23 1022   Wound Depth (cm) 0 cm 01/03/23 1022   Wound Surface Area (cm^2) 0 cm^2 01/03/23 1022   Wound Volume (cm^3) 0 cm^3 01/03/23 1022   Calculated Wound Volume (cm^3) 0 cm^3 01/03/23 1022   Change in Wound Size % 100 01/03/23 1022   Drainage Amount None 01/03/23 1022   Non-staged Wound Description Not applicable 00/55/65 2086   Dressing Status Other (Comment) (no dressing upon arrival) 01/03/23 1022       Wound 12/20/22 Buttocks Right (Active)   Wound Image Images linked 01/03/23 1021   Wound Description Eschar 01/03/23 1016   Marlene-wound Assessment Purple;Dry 01/03/23 1016   Wound Length (cm) 0 cm 01/03/23 1016   Wound Width (cm) 0 cm 01/03/23 1016   Wound Depth (cm) 0 cm 01/03/23 1016   Wound Surface Area (cm^2) 0 cm^2 01/03/23 1016   Wound Volume (cm^3) 0 cm^3 01/03/23 1016   Calculated Wound Volume (cm^3) 0 cm^3 01/03/23 1016   Change in Wound Size % 100 01/03/23 1016   Drainage Amount None 01/03/23 1016   Non-staged Wound Description Not applicable 58/30/03 9759   Dressing Status Other (Comment) (no dressing upon arrival) 01/03/23 1016       /82   Pulse 73   Temp (!) 97 1 °F (36 2 °C)   Resp 15     Physical Exam  Vitals and nursing note reviewed     Constitutional: General: She is not in acute distress  Appearance: Normal appearance  She is obese  HENT:      Head: Normocephalic and atraumatic  Eyes:      General:         Right eye: No discharge  Left eye: No discharge  Pulmonary:      Effort: Pulmonary effort is normal  No respiratory distress  Musculoskeletal:         General: Normal range of motion  Cervical back: Normal range of motion and neck supple  No rigidity  Right lower leg: Edema present  Left lower leg: Edema present  Comments: Mild bilateral lower extremity edema   Skin:     General: Skin is warm and dry  Findings: Wound present  No erythema  Neurological:      General: No focal deficit present  Mental Status: She is alert and oriented to person, place, and time  Mental status is at baseline  Psychiatric:         Mood and Affect: Mood normal          Behavior: Behavior normal          Thought Content: Thought content normal          Judgment: Judgment normal                             Wound Instructions:  Orders Placed This Encounter   Procedures   • Wound cleansing and dressings     Right and Left Buttock wounds are healed  Shower: Yes    Recommend applying Hydroguard cream daily to buttocks     Continue to try to keep pressure off buttocks               Standing Status:   Future     Standing Expiration Date:   1/3/2024   • Wound cleansing and dressings     Right Heel Blister:     Wash your hands with soap and water  Remove old dressing, discard into plastic bag and place in trash  Cleanse the wound with soap and water prior to applying a clean dressing  Do not use tissue or cotton balls  Do not scrub the wound  Pat dry using gauze    Shower yes   Apply moisturizer to skin surrounding wound  Apply betadine to the heel wound daily       Put wedge that you have from the hospital under leg to elevate heel off the bed                 Standing Status:   Future     Standing Expiration Date:   1/3/2024 • Wound off loading     Off-loading Instructions:     Keep weight and pressure off wound at all times  , Use your foam cushion to sit as you have been  (Do not use donut-type devices)  Seat lifts or shift position in chair every 15 minutes  , Turn every 2 hours  Avoid position directing pressure to Wound site  Limit side lying to 30 degree tilt  Limit the head of bed elevation to 30 degrees  and Do Not Sit for Long Periods of Time      Use your hospital wedge to elevate right heel off of bed              Standing Status:   Future     Standing Expiration Date:   1/3/2024   • Wound miscellaneous orders     Have xray of right heel done as ordered  Continue to eat increased protein three times per day  Standing Status:   Future     Standing Expiration Date:   1/3/2024   • XR heel / calcaneus 2+ vw right     R/o osteomyelitis in right heel  [facility]     Standing Status:   Future     Standing Expiration Date:   1/3/2027     Scheduling Instructions:      Bring along any outside films relating to this procedure  Diagnosis ICD-10-CM Associated Orders   1  Pressure ulcer of left buttock, stage 2 ([facility])  L89 [id number] Wound cleansing and dressings     Wound cleansing and dressings     Wound off loading     Wound miscellaneous orders      2  Pressure ulcer of right buttock, stage 2 ([facility])  L89 312 Wound cleansing and dressings     Wound cleansing and dressings     Wound off loading     Wound miscellaneous orders      3   Type 2 diabetes mellitus with foot ulcer (CODE) ([facility])  E11 621 Wound cleansing and dressings     Wound cleansing and dressings     Wound off loading     Wound miscellaneous orders     XR heel / calcaneus 2+ vw right

## 2023-01-12 ENCOUNTER — HOSPITAL ENCOUNTER (OUTPATIENT)
Dept: RADIOLOGY | Facility: HOSPITAL | Age: 73
Discharge: HOME | End: 2023-01-12
Attending: INTERNAL MEDICINE

## 2023-01-12 ENCOUNTER — HOSPITAL ENCOUNTER (OUTPATIENT)
Dept: RADIOLOGY | Facility: HOSPITAL | Age: 73
Discharge: HOME | End: 2023-01-12

## 2023-01-12 VITALS — WEIGHT: 250 LBS | HEIGHT: 62 IN | BODY MASS INDEX: 46.01 KG/M2

## 2023-01-12 DIAGNOSIS — E11.621 TYPE 2 DIABETES MELLITUS WITH FOOT ULCER (CODE) (HCC): ICD-10-CM

## 2023-01-12 DIAGNOSIS — Z12.31 SCREENING MAMMOGRAM, ENCOUNTER FOR: ICD-10-CM

## 2023-01-17 ENCOUNTER — OFFICE VISIT (OUTPATIENT)
Dept: WOUND CARE | Facility: HOSPITAL | Age: 73
End: 2023-01-17

## 2023-01-17 VITALS
RESPIRATION RATE: 16 BRPM | TEMPERATURE: 97.3 F | DIASTOLIC BLOOD PRESSURE: 85 MMHG | HEART RATE: 74 BPM | SYSTOLIC BLOOD PRESSURE: 166 MMHG

## 2023-01-17 DIAGNOSIS — L97.418 NON-PRESSURE CHRONIC ULCER OF RIGHT HEEL AND MIDFOOT WITH OTHER SPECIFIED SEVERITY (HCC): Primary | ICD-10-CM

## 2023-01-17 DIAGNOSIS — E11.621 TYPE 2 DIABETES MELLITUS WITH FOOT ULCER (CODE) (HCC): ICD-10-CM

## 2023-01-17 RX ORDER — LIDOCAINE HYDROCHLORIDE 40 MG/ML
5 SOLUTION TOPICAL ONCE
Status: COMPLETED | OUTPATIENT
Start: 2023-01-17 | End: 2023-01-17

## 2023-01-17 RX ADMIN — LIDOCAINE HYDROCHLORIDE 5 ML: 40 SOLUTION TOPICAL at 10:45

## 2023-01-17 NOTE — PROGRESS NOTES
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 (HCC)  -     lidocaine (XYLOCAINE) 4 % topical solution 5 mL  -     Wound cleansing and dressings; Future  -     Wound off loading; Future    Type 2 diabetes mellitus with foot ulcer (CODE) ([facility] )          Plan:  1  F/U visit  Wound cleansed with normal saline and gauze  Eschar on wound remains dry and intact and starting to lift at edges  We will continue to apply Betadine to eschar daily  Reviewed results of right heel x-ray with patient and daughter today which was negative for osteomyelitis  Continue frequent offloading of pressure from right heel  Patient verbalized agreement and understanding with plan of care  Patient will follow-up in 2 weeks     Wound 12/20/22 Pressure Injury Heel Right (Active)   Wound Image Images linked 01/17/23 1042   Wound Description Pale;Pink; Other (Comment) (dark purple under skin) 01/17/23 1042   Marlene-wound Assessment Dry 01/17/23 1042   Wound Length (cm) 3 cm 01/17/23 1042   Wound Width (cm) 4 cm 01/17/23 1042   Wound Depth (cm) 0 1 cm 01/17/23 1042   Wound Surface Area (cm^2) 12 cm^2 01/17/23 1042   Wound Volume (cm^3) 1 2 cm^3 01/17/23 1042   Calculated Wound Volume (cm^3) 1 2 cm^3 01/17/23 1042   Drainage Amount Scant 01/17/23 1042   Drainage Description Serosanguineous 01/17/23 1042   Non-staged Wound Description Full thickness 01/17/23 1042   Dressing Status Other (Comment) (upon arrival) 01/17/23 1042       Wound 12/20/22 Pressure Injury Heel Right (Active)   Date First Assessed/Time First Assessed: 12/20/22 1042   Primary Wound Type: Pressure Injury  Location: Heel  Wound Location Orientation: Right  Wound Description (Comments): Stage 1       [REMOVED] Wound 06/10/22 Cellulitis Arm Right (Removed)   Resolved Date: 12/20/22  Date First Assessed/Time First Assessed: 06/10/22 1257   Pre-Existing Wound: No  Traumatic Wound Type: Cellulitis  Location: Arm  Wound Location Orientation: Right  Dressing Status: Open to air  Wound Outcome: (c) Other (Comment)       [REMOVED] Wound 11/02/22 Pressure Injury Buttocks Right;Left (Removed)   Resolved Date: 12/20/22  Date First Assessed/Time First Assessed: 11/02/22 2100   Pre-Existing Wound: (c) Yes  Primary Wound Type: Pressure Injury  Location: Buttocks  Wound Location Orientation: Right;Left  Wound Description (Comments): stage 2 press    [REMOVED] Wound 12/20/22 Pressure Injury Buttocks Left (Removed)   Resolved Date: 01/03/23  Date First Assessed/Time First Assessed: 12/20/22 1048   Primary Wound Type: Pressure Injury  Location: Buttocks  Wound Location Orientation: Left  Wound Description (Comments): stage 2  Wound Outcome: Healed       [REMOVED] Wound 12/20/22 Buttocks Right (Removed)   Resolved Date: 01/01/23  Date First Assessed/Time First Assessed: 12/20/22 1048   Location: Buttocks  Wound Location Orientation: Right  Wound Description (Comments): stage 2  Wound Outcome: Healed       Subjective:     F/U visit for diabetic foot ulcer of right heel  No new complaints  Patient still reports pain in right heel with pressure  She reports she has been offloading pressure from her right heel at all times with a green wedge while she is at home  She denies any fevers or chills        The following portions of the patient's history were reviewed and updated as appropriate:   She  has a past medical history of Anemia, Asthma, Chronic kidney disease, COPD (chronic obstructive pulmonary disease) ([facility] ), COVID-19, Diabetes mellitus ([facility] ), GERD (gastroesophageal reflux disease), Hyperlipidemia, Hypertension, PONV (postoperative nausea and vomiting), and SVT (supraventricular tachycardia) ([facility] )  She   Patient Active Problem List    Diagnosis Date Noted   • Chronic obstructive pulmonary disease, unspecified COPD type ([facility] ) 12/30/2022   • Heel blister 12/12/2022   • Blister of right heel 12/06/2022   • Impaired mobility and ADLs 11/08/2022   • Pressure injury of sacral region, stage 2 ([facility] ) 11/08/2022   • Rash 08/19/2022   • PSVT (paroxysmal supraventricular tachycardia) ([facility] ) 07/17/2022   • Chronic respiratory failure with hypoxia and hypercapnia ([facility] ) 06/14/2022   • Platelets decreased ([facility] ) 06/14/2022   • Elevated serum creatinine 06/08/2022   • KATRINA (obstructive sleep apnea) 06/04/2022   • Obesity hypoventilation syndrome ([facility] ) 05/17/2022   • Morbid obesity ([facility] ) 04/22/2022   • Lump of left breast 04/08/2022   • Pressure injury of deep tissue of sacral region 04/08/2022   • Pulmonary nodules 03/30/2022   • Chronic diastolic heart failure ([facility] ) 03/29/2022   • Peripheral venous insufficiency 03/25/2022   • Post-herpetic polyneuropathy 03/25/2022   • Raynaud's disease 03/25/2022   • Lung nodule 03/25/2022   • Stage 3 chronic kidney disease ([facility] )    • Status post reverse total replacement of left shoulder 03/09/2022   • PONV (postoperative nausea and vomiting) 02/28/2022   • Diabetes mellitus, type 2 ([facility] ) 02/28/2022   • Gastroesophageal reflux disease 02/28/2022   • Nephrolithiasis 02/28/2022   • Arthritis 02/28/2022   • S/P total knee replacement, right 02/28/2022   • On continuous oral anticoagulation - eliquis 02/28/2022   • Essential hypertension 08/01/2019   • Anemia 07/31/2019   • Mixed hyperlipidemia 06/25/2019   • Paroxysmal atrial fibrillation ([facility] ) 05/28/2019   • Lower leg edema 05/27/2019   • Asthma 03/08/2018   • Morbid obesity with BMI of 45 0-49 9, adult ([facility] ) 03/08/2018     She  has a past surgical history that includes Joint replacement;  Appendectomy; Knee Arthroplasty (Right); Shoulder arthrotomy (Left); Dilation and curettage of uterus; Cystoscopy w/ laser lithotripsy (Left, 07/12/2016); pr cysto bladder w/ureteral catheterization (Left, 06/29/2016); pr arthroplasty glenohumeral joint total shoulder (Left, 03/01/2022); IR thoracentesis (03/18/2022); and Breast biopsy (Right)  Her family history includes Diabetes in her father; Emphysema in her maternal grandmother; Heart disease in her family and mother; Thyroid cancer in her sister  She  reports that she quit smoking about 26 years ago  Her smoking use included cigarettes  She has a 20 00 pack-year smoking history  She has never used smokeless tobacco  She reports that she does not currently use alcohol  She reports that she does not use drugs  Current Outpatient Medications   Medication Sig Dispense Refill   • acetaminophen (TYLENOL) 325 mg tablet Take 650 mg by mouth every 6 (six) hours as needed for mild pain       • albuterol (PROVENTIL HFA,VENTOLIN HFA) 90 mcg/act inhaler Inhale 2 puffs every 6 (six) hours as needed for wheezing 8 g 4   • ammonium lactate (LAC-HYDRIN) 12 % lotion APPLY TOPICALLY TO AFFECTED AREAS twice a day     • apixaban (ELIQUIS) 5 mg Take 1 tablet (5 mg total) by mouth in the morning and 1 tablet (5 mg total) in the evening   180 tablet 1   • B Complex-C-Folic Acid (triphrocaps) 1 MG CAPS take 1 capsule by mouth once daily 30 capsule 5   • docusate sodium (COLACE) 100 mg capsule Take 100 mg by mouth in the morning     • fluticasone (FLONASE) 50 mcg/act nasal spray 1 spray into each nostril daily  0   • glucose blood test strip Use 1 each 2 (two) times a day Use one 2 times daily 200 strip 5   • insulin lispro (HumaLOG KwikPen) 100 units/mL injection pen 3 times with meal according to sliding scale - >Blood Glucose 150 - 199: 2 Unit of Insulin, Blood Glucose 200 - 249: 4 Units of Insulin, Blood Glucose 250 - 299: 6 Units of Insulin, Blood Glucose 300 - 349: 8 Units of Insulin, Blood Glucose 350 - 399: 10 Units of Inuslin,Blood Glucose greater than or equal to 400: 12 Units of Insulin-please contact your physician 15 mL 0   • Insulin Pen Needle (BD Pen Needle Pilar 2nd Gen) 32G X 4 MM MISC For use with insulin pen  Pharmacy may dispense brand covered by insurance  100 each 0   • Insulin Pen Needle (NovoFine Autocover) 30G X 8 MM MISC Inject under the skin daily 100 each 3   • Insulin Pen Needle 30G X 8 MM MISC 2 (two) times a day     • Insulin Pen Needle 31G X 8 MM MISC Use daily Inject under the skin 100 each 2   • Lancets (onetouch ultrasoft) lancets Use once daily 100 each 2   • metoprolol succinate (TOPROL-XL) 50 mg 24 hr tablet Take 1 tablet (50 mg total) by mouth daily 30 tablet 3   • montelukast (SINGULAIR) 10 mg tablet Take 10 mg by mouth daily       • pregabalin (LYRICA) 100 mg capsule take 1 capsule by mouth twice a day 180 capsule 1   • rosuvastatin (CRESTOR) 10 MG tablet take 1 tablet by mouth once daily 90 tablet 1   • torsemide (DEMADEX) 20 mg tablet Take 2 tablets in the AM 90 tablet 1   • Toujeo SoloStar 300 units/mL CONCENTRATED U-300 injection pen (1-unit dial) Inject 35 Units under the skin daily at bedtime 4 5 mL 3     No current facility-administered medications for this visit  She is allergic to penicillins, moxifloxacin, percocet [oxycodone-acetaminophen], zinc acetate, asa [aspirin], indocin [indomethacin], and other       Review of Systems   Constitutional: Negative  HENT: Negative for ear pain and hearing loss  Eyes: Negative for pain  Respiratory: Negative for chest tightness and shortness of breath  Cardiovascular: Positive for leg swelling  Negative for chest pain and palpitations  Gastrointestinal: Negative for diarrhea, nausea and vomiting  Genitourinary: Negative for dysuria  Musculoskeletal: Positive for gait problem  Skin: Positive for wound  Neurological: Positive for numbness  Negative for tremors and weakness     Psychiatric/Behavioral: Negative for behavioral problems, confusion and suicidal ideas  Objective:       Wound 12/20/22 Pressure Injury Heel Right (Active)   Wound Image Images linked 01/17/23 1042   Wound Description Pale;Pink; Other (Comment) (dark purple under skin) 01/17/23 1042   Marlene-wound Assessment Dry 01/17/23 1042   Wound Length (cm) 3 cm 01/17/23 1042   Wound Width (cm) 4 cm 01/17/23 1042   Wound Depth (cm) 0 1 cm 01/17/23 1042   Wound Surface Area (cm^2) 12 cm^2 01/17/23 1042   Wound Volume (cm^3) 1 2 cm^3 01/17/23 1042   Calculated Wound Volume (cm^3) 1 2 cm^3 01/17/23 1042   Drainage Amount Scant 01/17/23 1042   Drainage Description Serosanguineous 01/17/23 1042   Non-staged Wound Description Full thickness 01/17/23 1042   Dressing Status Other (Comment) (upon arrival) 01/17/23 1042       /85   Pulse 74   Temp (!) 97 3 °F (36 3 °C)   Resp 16     Physical Exam  Vitals and nursing note reviewed  Constitutional:       General: She is not in acute distress  Appearance: Normal appearance  She is obese  HENT:      Head: Normocephalic and atraumatic  Eyes:      General:         Right eye: No discharge  Left eye: No discharge  Pulmonary:      Effort: Pulmonary effort is normal  No respiratory distress  Musculoskeletal:      Cervical back: Normal range of motion and neck supple  No rigidity  Right lower leg: Edema present  Left lower leg: Edema present  Comments: Mild lower extremity edema   Skin:     General: Skin is warm and dry  Findings: Wound present  No erythema  Neurological:      General: No focal deficit present  Mental Status: She is alert and oriented to person, place, and time  Mental status is at baseline  Psychiatric:         Mood and Affect: Mood normal          Behavior: Behavior normal          Thought Content:  Thought content normal          Judgment: Judgment normal                    Wound Instructions:  Orders Placed This Encounter   Procedures   • Wound cleansing and dressings       Right Heel Blister:     Wash your hands with soap and water  Remove old dressing, discard into plastic bag and place in trash  Cleanse the wound with soap and water prior to applying a clean dressing  Do not use tissue or cotton balls  Do not scrub the wound  Pat dry using gauze  Shower yes   Apply moisturizer to skin surrounding wound  Apply betadine to the heel wound daily       Put wedge that you have from the hospital under leg to elevate heel off the bed                                                      Standing Status:   Future     Standing Expiration Date:   1/17/2024   • Wound off loading         Off-loading Instructions:     Keep weight and pressure off wound at all times  , Use your foam cushion to sit as you have been  (Do not use donut-type devices)  Seat lifts or shift position in chair every 15 minutes  , Turn every 2 hours  Avoid position directing pressure to Wound site  Limit side lying to 30 degree tilt  Limit the head of bed elevation to 30 degrees  and Do Not Sit for Long Periods of Time      Use your hospital wedge to elevate right heel off of bed                        Standing Status:   Future     Standing Expiration Date:   1/17/2024        Diagnosis ICD-10-CM Associated Orders   1  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

## 2023-01-17 NOTE — PATIENT INSTRUCTIONS
Orders Placed This Encounter   Procedures    Wound cleansing and dressings       Right Heel Blister:     Wash your hands with soap and water  Remove old dressing, discard into plastic bag and place in trash  Cleanse the wound with soap and water prior to applying a clean dressing  Do not use tissue or cotton balls  Do not scrub the wound  Pat dry using gauze  Shower yes   Apply moisturizer to skin surrounding wound  Apply betadine to the heel wound daily  Put wedge that you have from the hospital under leg to elevate heel off the bed  Standing Status:   Future     Standing Expiration Date:   1/17/2024    Wound off loading         Off-loading Instructions:     Keep weight and pressure off wound at all times  , Use your foam cushion to sit as you have been  (Do not use donut-type devices)  Seat lifts or shift position in chair every 15 minutes  , Turn every 2 hours  Avoid position directing pressure to Wound site  Limit side lying to 30 degree tilt  Limit the head of bed elevation to 30 degrees  and Do Not Sit for Long Periods of Time  Use your hospital wedge to elevate right heel off of bed                         Standing Status:   Future     Standing Expiration Date:   1/17/2024

## 2023-01-19 ENCOUNTER — TELEPHONE (OUTPATIENT)
Dept: WOUND CARE | Facility: HOSPITAL | Age: 73
End: 2023-01-19

## 2023-01-22 ENCOUNTER — HOSPITAL ENCOUNTER (INPATIENT)
Facility: HOSPITAL | Age: 73
LOS: 5 days | Discharge: NURSING, REHAB OR LONG-TERM CARE | End: 2023-01-27
Attending: SURGERY | Admitting: INTERNAL MEDICINE

## 2023-01-22 ENCOUNTER — APPOINTMENT (EMERGENCY)
Dept: CT IMAGING | Facility: HOSPITAL | Age: 73
End: 2023-01-22

## 2023-01-22 DIAGNOSIS — J44.9 CHRONIC OBSTRUCTIVE PULMONARY DISEASE, UNSPECIFIED COPD TYPE (HCC): ICD-10-CM

## 2023-01-22 DIAGNOSIS — R51.9 HEADACHE: ICD-10-CM

## 2023-01-22 DIAGNOSIS — R55 SYNCOPE, UNSPECIFIED SYNCOPE TYPE: ICD-10-CM

## 2023-01-22 DIAGNOSIS — K59.00 CONSTIPATION: ICD-10-CM

## 2023-01-22 DIAGNOSIS — R04.0 EPISTAXIS: ICD-10-CM

## 2023-01-22 DIAGNOSIS — E11.69 TYPE 2 DIABETES MELLITUS WITH OTHER SPECIFIED COMPLICATION, WITH LONG-TERM CURRENT USE OF INSULIN (HCC): ICD-10-CM
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
## 2023-01-22 DIAGNOSIS — T14.8XXA OPEN WOUND: ICD-10-CM

## 2023-01-22 DIAGNOSIS — Z79.4 TYPE 2 DIABETES MELLITUS WITH OTHER SPECIFIED COMPLICATION, WITH LONG-TERM CURRENT USE OF INSULIN (HCC): ICD-10-CM

## 2023-01-22 PROBLEM — E11.9 DIABETES (HCC): Status: ACTIVE | Noted: 2023-01-22

## 2023-01-22 PROBLEM — N18.9 CKD (CHRONIC KIDNEY DISEASE): Status: ACTIVE | Noted: 2023-01-22

## 2023-01-22 PROBLEM — I10 HYPERTENSION: Status: ACTIVE | Noted: 2023-01-22

## 2023-01-22 PROBLEM — I50.9 CHF (CONGESTIVE HEART FAILURE) (HCC): Status: ACTIVE | Noted: 2023-01-22

## 2023-01-22 PROBLEM — J18.9 PNEUMONIA: Status: ACTIVE | Noted: 2023-01-22

## 2023-01-22 PROBLEM — A41.9 SEPSIS (HCC): Status: ACTIVE | Noted: 2023-01-22

## 2023-01-22 PROBLEM — I48.91 ATRIAL FIBRILLATION (HCC): Status: ACTIVE | Noted: 2023-01-22

## 2023-01-22 PROBLEM — G47.33 OBSTRUCTIVE SLEEP APNEA: Status: ACTIVE | Noted: 2023-01-22

## 2023-01-22 LAB
2HR DELTA HS TROPONIN: 1 NG/L
2HR DELTA HS TROPONIN: 1 NG/L
4HR DELTA HS TROPONIN: 1 NG/L
4HR DELTA HS TROPONIN: 1 NG/L
ANION GAP SERPL CALCULATED.3IONS-SCNC: 10 MMOL/L (ref 4–13)
ANION GAP SERPL CALCULATED.3IONS-SCNC: 10 MMOL/L (ref 4–13)
BASOPHILS # BLD AUTO: 0.09 THOUSANDS/ÂΜL (ref 0–0.1)
BASOPHILS # BLD AUTO: 0.09 THOUSANDS/ÂΜL (ref 0–0.1)
BASOPHILS NFR BLD AUTO: 1 % (ref 0–1)
BASOPHILS NFR BLD AUTO: 1 % (ref 0–1)
BUN SERPL-MCNC: 34 MG/DL (ref 5–25)
BUN SERPL-MCNC: 34 MG/DL (ref 5–25)
CALCIUM SERPL-MCNC: 9.3 MG/DL (ref 8.4–10.2)
CALCIUM SERPL-MCNC: 9.3 MG/DL (ref 8.4–10.2)
CARDIAC TROPONIN I PNL SERPL HS: 6 NG/L
CARDIAC TROPONIN I PNL SERPL HS: 6 NG/L
CARDIAC TROPONIN I PNL SERPL HS: 7 NG/L
CHLORIDE SERPL-SCNC: 98 MMOL/L (ref 96–108)
CHLORIDE SERPL-SCNC: 98 MMOL/L (ref 96–108)
CO2 SERPL-SCNC: 28 MMOL/L (ref 21–32)
CO2 SERPL-SCNC: 28 MMOL/L (ref 21–32)
CREAT SERPL-MCNC: 1.55 MG/DL (ref 0.6–1.3)
CREAT SERPL-MCNC: 1.55 MG/DL (ref 0.6–1.3)
EOSINOPHIL # BLD AUTO: 0.03 THOUSAND/ÂΜL (ref 0–0.61)
EOSINOPHIL # BLD AUTO: 0.03 THOUSAND/ÂΜL (ref 0–0.61)
EOSINOPHIL NFR BLD AUTO: 0 % (ref 0–6)
EOSINOPHIL NFR BLD AUTO: 0 % (ref 0–6)
ERYTHROCYTE [DISTWIDTH] IN BLOOD BY AUTOMATED COUNT: 13.7 % (ref 11.6–15.1)
ERYTHROCYTE [DISTWIDTH] IN BLOOD BY AUTOMATED COUNT: 13.7 % (ref 11.6–15.1)
FLUAV RNA RESP QL NAA+PROBE: NEGATIVE
FLUAV RNA RESP QL NAA+PROBE: NEGATIVE
FLUBV RNA RESP QL NAA+PROBE: NEGATIVE
FLUBV RNA RESP QL NAA+PROBE: NEGATIVE
GFR SERPL CREATININE-BSD FRML MDRD: 33 ML/MIN/1.73SQ M
GFR SERPL CREATININE-BSD FRML MDRD: 33 ML/MIN/1.73SQ M
GLUCOSE SERPL-MCNC: 288 MG/DL (ref 65–140)
GLUCOSE SERPL-MCNC: 288 MG/DL (ref 65–140)
GLUCOSE SERPL-MCNC: 326 MG/DL (ref 65–140)
GLUCOSE SERPL-MCNC: 326 MG/DL (ref 65–140)
GLUCOSE SERPL-MCNC: 383 MG/DL (ref 65–140)
GLUCOSE SERPL-MCNC: 383 MG/DL (ref 65–140)
GLUCOSE SERPL-MCNC: 423 MG/DL (ref 65–140)
GLUCOSE SERPL-MCNC: 423 MG/DL (ref 65–140)
GLUCOSE SERPL-MCNC: 467 MG/DL (ref 65–140)
GLUCOSE SERPL-MCNC: 467 MG/DL (ref 65–140)
HCT VFR BLD AUTO: 39.5 % (ref 34.8–46.1)
HCT VFR BLD AUTO: 39.5 % (ref 34.8–46.1)
HGB BLD-MCNC: 12.5 G/DL (ref 11.5–15.4)
HGB BLD-MCNC: 12.5 G/DL (ref 11.5–15.4)
IMM GRANULOCYTES # BLD AUTO: 0.07 THOUSAND/UL (ref 0–0.2)
IMM GRANULOCYTES # BLD AUTO: 0.07 THOUSAND/UL (ref 0–0.2)
IMM GRANULOCYTES NFR BLD AUTO: 0 % (ref 0–2)
IMM GRANULOCYTES NFR BLD AUTO: 0 % (ref 0–2)
LACTATE SERPL-SCNC: 1.1 MMOL/L (ref 0.5–2)
LACTATE SERPL-SCNC: 1.1 MMOL/L (ref 0.5–2)
LYMPHOCYTES # BLD AUTO: 1.18 THOUSANDS/ÂΜL (ref 0.6–4.47)
LYMPHOCYTES # BLD AUTO: 1.18 THOUSANDS/ÂΜL (ref 0.6–4.47)
LYMPHOCYTES NFR BLD AUTO: 8 % (ref 14–44)
LYMPHOCYTES NFR BLD AUTO: 8 % (ref 14–44)
MAGNESIUM SERPL-MCNC: 1.7 MG/DL (ref 1.9–2.7)
MAGNESIUM SERPL-MCNC: 1.7 MG/DL (ref 1.9–2.7)
MCH RBC QN AUTO: 29.4 PG (ref 26.8–34.3)
MCH RBC QN AUTO: 29.4 PG (ref 26.8–34.3)
MCHC RBC AUTO-ENTMCNC: 31.6 G/DL (ref 31.4–37.4)
MCHC RBC AUTO-ENTMCNC: 31.6 G/DL (ref 31.4–37.4)
MCV RBC AUTO: 93 FL (ref 82–98)
MCV RBC AUTO: 93 FL (ref 82–98)
MONOCYTES # BLD AUTO: 1.29 THOUSAND/ÂΜL (ref 0.17–1.22)
MONOCYTES # BLD AUTO: 1.29 THOUSAND/ÂΜL (ref 0.17–1.22)
MONOCYTES NFR BLD AUTO: 8 % (ref 4–12)
MONOCYTES NFR BLD AUTO: 8 % (ref 4–12)
NEUTROPHILS # BLD AUTO: 13.1 THOUSANDS/ÂΜL (ref 1.85–7.62)
NEUTROPHILS # BLD AUTO: 13.1 THOUSANDS/ÂΜL (ref 1.85–7.62)
NEUTS SEG NFR BLD AUTO: 83 % (ref 43–75)
NEUTS SEG NFR BLD AUTO: 83 % (ref 43–75)
NRBC BLD AUTO-RTO: 0 /100 WBCS
NRBC BLD AUTO-RTO: 0 /100 WBCS
PHOSPHATE SERPL-MCNC: 3.8 MG/DL (ref 2.3–4.1)
PHOSPHATE SERPL-MCNC: 3.8 MG/DL (ref 2.3–4.1)
PLATELET # BLD AUTO: 170 THOUSANDS/UL (ref 149–390)
PMV BLD AUTO: 10.7 FL (ref 8.9–12.7)
PMV BLD AUTO: 10.7 FL (ref 8.9–12.7)
PMV BLD AUTO: 10.9 FL (ref 8.9–12.7)
PMV BLD AUTO: 10.9 FL (ref 8.9–12.7)
POTASSIUM SERPL-SCNC: 4.5 MMOL/L (ref 3.5–5.3)
POTASSIUM SERPL-SCNC: 4.5 MMOL/L (ref 3.5–5.3)
PROCALCITONIN SERPL-MCNC: 0.13 NG/ML
PROCALCITONIN SERPL-MCNC: 0.13 NG/ML
RBC # BLD AUTO: 4.25 MILLION/UL (ref 3.81–5.12)
RBC # BLD AUTO: 4.25 MILLION/UL (ref 3.81–5.12)
RSV RNA RESP QL NAA+PROBE: NEGATIVE
RSV RNA RESP QL NAA+PROBE: NEGATIVE
SARS-COV-2 RNA RESP QL NAA+PROBE: NEGATIVE
SARS-COV-2 RNA RESP QL NAA+PROBE: NEGATIVE
SODIUM SERPL-SCNC: 136 MMOL/L (ref 135–147)
SODIUM SERPL-SCNC: 136 MMOL/L (ref 135–147)
TSH SERPL DL<=0.05 MIU/L-ACNC: 2.48 UIU/ML (ref 0.45–4.5)
TSH SERPL DL<=0.05 MIU/L-ACNC: 2.48 UIU/ML (ref 0.45–4.5)
WBC # BLD AUTO: 15.76 THOUSAND/UL (ref 4.31–10.16)
WBC # BLD AUTO: 15.76 THOUSAND/UL (ref 4.31–10.16)

## 2023-01-22 RX ORDER — SODIUM CHLORIDE, SODIUM GLUCONATE, SODIUM ACETATE, POTASSIUM CHLORIDE, MAGNESIUM CHLORIDE, SODIUM PHOSPHATE, DIBASIC, AND POTASSIUM PHOSPHATE .53; .5; .37; .037; .03; .012; .00082 G/100ML; G/100ML; G/100ML; G/100ML; G/100ML; G/100ML; G/100ML
500 INJECTION, SOLUTION INTRAVENOUS ONCE
Status: COMPLETED | OUTPATIENT
Start: 2023-01-22 | End: 2023-01-23

## 2023-01-22 RX ORDER — TORSEMIDE 20 MG/1
20 TABLET ORAL 2 TIMES DAILY
COMMUNITY

## 2023-01-22 RX ORDER — FLUTICASONE PROPIONATE 50 MCG
1 SPRAY, SUSPENSION (ML) NASAL DAILY
Status: DISCONTINUED | OUTPATIENT
Start: 2023-01-22 | End: 2023-01-27 | Stop reason: HOSPADM

## 2023-01-22 RX ORDER — TORSEMIDE 20 MG/1
40 TABLET ORAL DAILY
Status: DISCONTINUED | OUTPATIENT
Start: 2023-01-22 | End: 2023-01-23

## 2023-01-22 RX ORDER — MONTELUKAST SODIUM 10 MG/1
10 TABLET ORAL
COMMUNITY

## 2023-01-22 RX ORDER — MONTELUKAST SODIUM 10 MG/1
10 TABLET ORAL
Status: DISCONTINUED | OUTPATIENT
Start: 2023-01-22 | End: 2023-01-27 | Stop reason: HOSPADM

## 2023-01-22 RX ORDER — PREGABALIN 100 MG/1
100 CAPSULE ORAL 2 TIMES DAILY
Status: DISCONTINUED | OUTPATIENT
Start: 2023-01-22 | End: 2023-01-27 | Stop reason: HOSPADM

## 2023-01-22 RX ORDER — INSULIN LISPRO 100 [IU]/ML
10 INJECTION, SOLUTION INTRAVENOUS; SUBCUTANEOUS ONCE
Status: COMPLETED | OUTPATIENT
Start: 2023-01-22 | End: 2023-01-22

## 2023-01-22 RX ORDER — ALBUTEROL SULFATE 90 UG/1
2 AEROSOL, METERED RESPIRATORY (INHALATION) EVERY 6 HOURS PRN
COMMUNITY

## 2023-01-22 RX ORDER — INSULIN GLARGINE 300 U/ML
36 INJECTION, SOLUTION SUBCUTANEOUS EVERY 12 HOURS SCHEDULED
COMMUNITY

## 2023-01-22 RX ORDER — METOPROLOL SUCCINATE 50 MG/1
50 TABLET, EXTENDED RELEASE ORAL DAILY
Status: DISCONTINUED | OUTPATIENT
Start: 2023-01-22 | End: 2023-01-23

## 2023-01-22 RX ORDER — HEPARIN SODIUM 5000 [USP'U]/ML
5000 INJECTION, SOLUTION INTRAVENOUS; SUBCUTANEOUS EVERY 8 HOURS SCHEDULED
Status: DISCONTINUED | OUTPATIENT
Start: 2023-01-22 | End: 2023-01-22

## 2023-01-22 RX ORDER — METOPROLOL SUCCINATE 50 MG/1
50 TABLET, EXTENDED RELEASE ORAL DAILY
COMMUNITY

## 2023-01-22 RX ORDER — INSULIN LISPRO 100 [IU]/ML
2-12 INJECTION, SOLUTION INTRAVENOUS; SUBCUTANEOUS
Status: DISCONTINUED | OUTPATIENT
Start: 2023-01-22 | End: 2023-01-23

## 2023-01-22 RX ORDER — ACETAMINOPHEN 325 MG/1
650 TABLET ORAL EVERY 6 HOURS PRN
Status: DISCONTINUED | OUTPATIENT
Start: 2023-01-22 | End: 2023-01-27 | Stop reason: HOSPADM

## 2023-01-22 RX ORDER — ONDANSETRON 4 MG/1
4 TABLET, ORALLY DISINTEGRATING ORAL EVERY 6 HOURS PRN
Status: DISCONTINUED | OUTPATIENT
Start: 2023-01-22 | End: 2023-01-27 | Stop reason: HOSPADM

## 2023-01-22 RX ORDER — INSULIN LISPRO 100 [IU]/ML
2-12 INJECTION, SOLUTION INTRAVENOUS; SUBCUTANEOUS
Status: DISCONTINUED | OUTPATIENT
Start: 2023-01-23 | End: 2023-01-23

## 2023-01-22 RX ORDER — ALBUTEROL SULFATE 90 UG/1
2 AEROSOL, METERED RESPIRATORY (INHALATION) EVERY 6 HOURS PRN
Status: DISCONTINUED | OUTPATIENT
Start: 2023-01-22 | End: 2023-01-23

## 2023-01-22 RX ORDER — GUAIFENESIN 600 MG/1
600 TABLET, EXTENDED RELEASE ORAL EVERY 12 HOURS SCHEDULED
Status: DISCONTINUED | OUTPATIENT
Start: 2023-01-22 | End: 2023-01-23

## 2023-01-22 RX ORDER — FLUTICASONE PROPIONATE 50 MCG
1 SPRAY, SUSPENSION (ML) NASAL DAILY
COMMUNITY

## 2023-01-22 RX ORDER — PRAVASTATIN SODIUM 80 MG/1
80 TABLET ORAL
Status: DISCONTINUED | OUTPATIENT
Start: 2023-01-22 | End: 2023-01-27 | Stop reason: HOSPADM

## 2023-01-22 RX ORDER — INSULIN LISPRO 100 [IU]/ML
5 INJECTION, SOLUTION INTRAVENOUS; SUBCUTANEOUS ONCE
Status: COMPLETED | OUTPATIENT
Start: 2023-01-22 | End: 2023-01-22

## 2023-01-22 RX ORDER — MAGNESIUM SULFATE HEPTAHYDRATE 40 MG/ML
2 INJECTION, SOLUTION INTRAVENOUS ONCE
Status: COMPLETED | OUTPATIENT
Start: 2023-01-22 | End: 2023-01-23

## 2023-01-22 RX ORDER — PREGABALIN 100 MG/1
100 CAPSULE ORAL 2 TIMES DAILY
COMMUNITY

## 2023-01-22 RX ORDER — INSULIN GLARGINE 100 [IU]/ML
25 INJECTION, SOLUTION SUBCUTANEOUS EVERY 12 HOURS SCHEDULED
Status: DISCONTINUED | OUTPATIENT
Start: 2023-01-22 | End: 2023-01-23

## 2023-01-22 RX ORDER — ROSUVASTATIN CALCIUM 10 MG/1
10 TABLET, COATED ORAL DAILY
COMMUNITY

## 2023-01-22 RX ORDER — POLYETHYLENE GLYCOL 3350 17 G/17G
17 POWDER, FOR SOLUTION ORAL DAILY PRN
Status: DISCONTINUED | OUTPATIENT
Start: 2023-01-22 | End: 2023-01-27 | Stop reason: HOSPADM

## 2023-01-22 RX ADMIN — SODIUM CHLORIDE, SODIUM GLUCONATE, SODIUM ACETATE, POTASSIUM CHLORIDE, MAGNESIUM CHLORIDE, SODIUM PHOSPHATE, DIBASIC, AND POTASSIUM PHOSPHATE 500 ML: .53; .5; .37; .037; .03; .012; .00082 INJECTION, SOLUTION INTRAVENOUS at 22:53

## 2023-01-22 RX ADMIN — METOPROLOL SUCCINATE 50 MG: 50 TABLET, EXTENDED RELEASE ORAL at 13:24

## 2023-01-22 RX ADMIN — CEFTRIAXONE SODIUM 2000 MG: 10 INJECTION, POWDER, FOR SOLUTION INTRAVENOUS at 11:06

## 2023-01-22 RX ADMIN — GUAIFENESIN 600 MG: 600 TABLET ORAL at 13:24

## 2023-01-22 RX ADMIN — INSULIN LISPRO 5 UNITS: 100 INJECTION, SOLUTION INTRAVENOUS; SUBCUTANEOUS at 22:54

## 2023-01-22 RX ADMIN — TETANUS TOXOID, REDUCED DIPHTHERIA TOXOID AND ACELLULAR PERTUSSIS VACCINE, ADSORBED 0.5 ML: 5; 2.5; 8; 8; 2.5 SUSPENSION INTRAMUSCULAR at 11:06

## 2023-01-22 RX ADMIN — GUAIFENESIN 600 MG: 600 TABLET ORAL at 21:11

## 2023-01-22 RX ADMIN — SODIUM CHLORIDE, SODIUM GLUCONATE, SODIUM ACETATE, POTASSIUM CHLORIDE, MAGNESIUM CHLORIDE, SODIUM PHOSPHATE, DIBASIC, AND POTASSIUM PHOSPHATE 500 ML: .53; .5; .37; .037; .03; .012; .00082 INJECTION, SOLUTION INTRAVENOUS at 11:42

## 2023-01-22 RX ADMIN — ONDANSETRON 4 MG: 4 TABLET, ORALLY DISINTEGRATING ORAL at 10:12

## 2023-01-22 RX ADMIN — INSULIN GLARGINE 25 UNITS: 100 INJECTION, SOLUTION SUBCUTANEOUS at 13:25

## 2023-01-22 RX ADMIN — INSULIN GLARGINE 25 UNITS: 100 INJECTION, SOLUTION SUBCUTANEOUS at 21:11

## 2023-01-22 RX ADMIN — PRAVASTATIN SODIUM 80 MG: 80 TABLET ORAL at 18:24

## 2023-01-22 RX ADMIN — PREGABALIN 100 MG: 100 CAPSULE ORAL at 13:24

## 2023-01-22 RX ADMIN — MAGNESIUM SULFATE HEPTAHYDRATE 2 G: 40 INJECTION, SOLUTION INTRAVENOUS at 22:53

## 2023-01-22 RX ADMIN — APIXABAN 5 MG: 5 TABLET, FILM COATED ORAL at 13:24

## 2023-01-22 RX ADMIN — PREGABALIN 100 MG: 100 CAPSULE ORAL at 18:24

## 2023-01-22 RX ADMIN — TORSEMIDE 40 MG: 20 TABLET ORAL at 13:24

## 2023-01-22 RX ADMIN — INSULIN LISPRO 12 UNITS: 100 INJECTION, SOLUTION INTRAVENOUS; SUBCUTANEOUS at 21:12

## 2023-01-22 RX ADMIN — INSULIN LISPRO 10 UNITS: 100 INJECTION, SOLUTION INTRAVENOUS; SUBCUTANEOUS at 21:09

## 2023-01-22 RX ADMIN — APIXABAN 5 MG: 5 TABLET, FILM COATED ORAL at 18:23

## 2023-01-22 RX ADMIN — MONTELUKAST 10 MG: 10 TABLET, FILM COATED ORAL at 21:11

## 2023-01-22 NOTE — H&P
[address] [facility] 1950, 81 y o  female MRN: [id number]  Unit/Bed#: S -01 Encounter: [id number]  Primary Care Provider: Evie Brito MD   Date and time admitted to hospital: 1/22/2023  9:17 AM    * Pneumonia  Assessment & Plan  Patient presents after syncopal episode while being on the toilet  Past medical history significant for COPD, CHF, obstructive sleep apnea with poor CPAP compliance  Granddaughter found patient unconscious on the floor  O2 sat at that time was reportedly 88%  Patient reports not feeling well for the past several days complaining of productive cough with green sputum  Patient denies fever, chills or sweats  No chest pain or complaints of being unusually short of breath reported  She is found to be tachycardic on arrival to the emergency department, patient does admit to not taking her metoprolol this morning  Work-up in the emergency department significant for leukocytosis and CT concerning for multifocal right sided pneumonia  Given 500 cc bolus and one-time dose of Rocephin in the emergency department  Physical exam mostly noncontributory due to large body habitus  Plan:  Continue Rocephin 1 g daily  Mucinex every 12 hours  Trend blood cultures  Sputum cultures ordered  Trend procalcitonin  Trend CBC  Continue supplementary oxygen to maintain O2 sat between 88 and 92%      Sepsis ([address] )  Assessment & Plan  Secondary to community-acquired pneumonia  Patient presents with leukocytosis, tachycardia and suspected source of right sided multifocal pneumonia  Please refer to A&P under community-acquired pneumonia      Syncope  Assessment & Plan  Patient presents after syncopal episode while sitting on the toilet    Past medical history significant for atrial fibrillation maintained on metoprolol and Eliquis, CHF on torsemide 40 mg daily, and type 2 diabetes  Patient says she went to the bathroom earlier this morning and was found on the bathroom floor by her granddaughter  Patient does not recall prodromal symptoms prior to passing out but says she may have been trying to stand up  She denies straining just before losing consciousness  Granddaughter reports O2 saturation at the time of finding the patient was approximately 88%  Patient found to be tachycardic and hypertensive on POA but she did not take her metoprolol this morning  EKG shows sinus tachycardia  Troponins are negative  Stress test from August 2022 shows an ejection fraction of 70%  As of now there is an unclear etiology of why the patient lost consciousness, however tachyarrhythmia and orthostatic hypotension are certainly possibilities  She will need further work-up and observation  Plan:  Orthostatics ordered  Telemetry  Fall precautions ordered  Trend BMP        Open wound  Assessment & Plan  Patient has history of open wound on right heel for which she follows up with wound care as outpatient  Family seems to be very supportive and helping keep the wound clean  Past medical history is significant for type 2 diabetes  Neurovascular exam of both feet appears to be intact  Wound on right heel is exposed but does not appear to be purulent or infected  Podiatry consult requested for further management  Recommendations are appreciated  Obstructive sleep apnea  Assessment & Plan  Patient has past medical history obstructive sleep apnea for which she is supposed to be using a CPAP at night  Patient reports poor compliance with CPAP due to discomfort with the mask  CPAP ordered for this admission  Patient will need referral to sleep medicine on discharge to discuss difficulties with compliance      Hypertension  Assessment & Plan  Maintained on Toprol-XL 50 mg and torsemide 40 mg daily  Continue home medications    CKD (chronic kidney disease)  Assessment & Plan  Lab Results   Component Value Date    EGFR 33 01/22/2023    CREATININE 1 55 (H) 01/22/2023   Baseline creatinine seems to range between 1 5 and 1 8  Trend BMP for worsening creatinine  Avoid hypotension and nephrotoxic agents  Monitor for changes in urinary output    Atrial fibrillation ([facility])  Assessment & Plan  Maintained on Toprol-XL 50 mg daily and Eliquis 5 mg twice daily  Patient is presenting for syncopal episode  EKG on this admission shows sinus tachycardia with heart rate of 113, patient noted to be septic on this admission and she admits to not taking her metoprolol this morning  Plan: Will place on telemetry to monitor for uncontrolled A  fib as potential source of syncope  Continue home medications  Monitor BMP        CHF (congestive heart failure) ([facility])  Assessment & Plan  Wt Readings from Last 3 Encounters:   01/22/23 125 kg (275 lb 9 2 oz)     Maintained on torsemide 40 mg daily  Stress test in August 2022 shows an ejection fraction of 70%  Patient appears to be euvolemic on this admission  Continue home medications  Monitor BMP  Magnesium level ordered  Cardiac diet ordered  Diabetes ([facility] [address] )  Assessment & Plan  No results found for: HGBA1C    No results for input(s): POCGLU in the last 72 hours  Blood Sugar Average: Last 72 hrs:    Blood sugar appears to be poorly controlled on this admission  Patient is maintained on Toujeo 25 units before breakfast and 25 units before bedtime along with mealtime Humalog  Patient admits to only checking her blood sugars and taking mealtime insulin at breakfast     Plan: Toujeo 25 units before breakfast and 25 units before bedtime ordered  Sliding scale insulin ordered for mealtime insulin  Hypoglycemia protocol  Diabetic/cardiac diet ordered  Reinforced importance of blood glucose monitoring    COPD (chronic obstructive pulmonary disease) ([facility])  Assessment & Plan  Maintained on albuterol every 6 hours as needed, daily fluticasone, Singulair nightly  Patient uses 2 L nasal cannula oxygen at nighttime      Plan:  Continue home maintenance medications  Continue supplementary oxygen to maintain O2 sats between 88 and 92%    VTE Pharmacologic Prophylaxis: VTE Score: 9 High Risk (Score >/= 5) - Pharmacological DVT Prophylaxis Ordered: apixaban (Eliquis)  Sequential Compression Devices Ordered  Code Status: Level 1 - Full Code   Discussion with family: Updated  (daughter and Granddaughter) at bedside  Anticipated Length of Stay: Patient will be admitted on an inpatient basis with an anticipated length of stay of greater than 2 midnights secondary to Sepsis  Chief Complaint: Loss of consciousness    History of Present Illness:  Alex Ely is a 81 y o  female with a PMH of hypertension, type 2 diabetes, COPD, OSMANY, CHF and atrial fibrillation who presents after losing consciousness on the toilet earlier this morning  Patient says that somewhere between 7 and 8 AM this morning she went to the bathroom and sat down on the toilet and passed out  She has very little memory of the events leading up to her losing consciousness however she denies straining, feeling flushed or lightheaded  Patient did report head strike upon falling  Her granddaughter found her on the ground without her pants up almost straddling the toilet as if she was trying to stand up  Patient regained consciousness and thought she was in bed  Daughter says she tested pulse oximetry with the patient on the floor and her O2 sat was 88%  She was brought to the emergency department by her family where she was found to be tachycardic  Trauma work-up was negative however CT chest abdomen pelvis was incidentally concerning for right-sided multifocal pneumonia  Patient does admit to feeling ill for the past several days and complains of cough that brings up green sputum  Further evaluation showed leukocytosis, negative troponins and tachycardia on EKG  Patient does admit to not taking her metoprolol earlier this morning    She was given 500 cc bolus in the ED and started on Rocephin  Patient is being admitted for further work-up and management of sepsis secondary to community-acquired pneumonia  Review of Systems:  Review of Systems   Constitutional: Negative for chills and fever  HENT: Negative for ear pain and sore throat  Eyes: Negative for pain and visual disturbance  Respiratory: Negative for cough, choking and chest tightness  Cough, green sputum production   Cardiovascular: Negative for chest pain, palpitations and leg swelling  Gastrointestinal: Negative for abdominal pain, blood in stool, constipation, diarrhea, nausea and vomiting  Genitourinary: Negative for dysuria and hematuria  Musculoskeletal: Negative for arthralgias and back pain  Skin: Negative for color change and rash  Wound on right heel   Neurological: Negative for dizziness, seizures, syncope, weakness, light-headedness and headaches  All other systems reviewed and are negative  Past Medical and Surgical History:   No past medical history on file  No past surgical history on file  Meds/Allergies:  Prior to Admission medications    Medication Sig Start Date End Date Taking?  Authorizing Provider   albuterol (PROVENTIL HFA,VENTOLIN HFA) 90 mcg/act inhaler Inhale 2 puffs every 6 (six) hours as needed for wheezing   Yes Historical Provider, MD   apixaban (Eliquis) 5 mg Take 5 mg by mouth 2 (two) times a day   Yes Historical Provider, MD   fluticasone (FLONASE) 50 mcg/act nasal spray 1 spray into each nostril daily   Yes Historical Provider, MD   insulin glargine (Toujeo SoloStar) 300 units/mL CONCENTRATED U-300 injection pen (1-unit dial) Inject 35 Units under the skin daily at bedtime   Yes Historical Provider, MD   metoprolol succinate (TOPROL-XL) 50 mg 24 hr tablet Take 50 mg by mouth daily   Yes Historical Provider, MD   montelukast (SINGULAIR) 10 mg tablet Take 10 mg by mouth daily at bedtime   Yes Historical Provider, MD   pregabalin (LYRICA) 100 mg capsule Take 100 mg by mouth 2 (two) times a day   Yes Historical Provider, MD   rosuvastatin (CRESTOR) 10 MG tablet Take 10 mg by mouth daily   Yes Historical Provider, MD   torsemide (DEMADEX) 20 mg tablet Take 20 mg by mouth 2 (two) times a day   Yes Historical Provider, MD     I have reviewed home medications with patient personally  Allergies: Allergies   Allergen Reactions   • Penicillins Hives       Social History:  Marital Status: Single   Occupation:   Patient Pre-hospital Living Situation: Home  Patient Pre-hospital Level of Mobility: walks with cane  Patient Pre-hospital Diet Restrictions:   Substance Use History:   Social History     Substance and Sexual Activity   Alcohol Use Not on file     Social History     Tobacco Use   Smoking Status Not on file   Smokeless Tobacco Not on file     Social History     Substance and Sexual Activity   Drug Use Not on file       Family History:  No family history on file  Physical Exam:     Vitals:   Blood Pressure: 139/75 (01/22/23 1130)  Pulse: 98 (01/22/23 1130)  Temperature: 99 3 °F (37 4 °C) (01/22/23 0920)  Temp Source: Oral (01/22/23 0920)  Respirations: 20 (01/22/23 1130)  Height: 5' 2" (157 5 cm) (01/22/23 1980)  Weight - Scale: 125 kg (275 lb 9 2 oz) (01/22/23 0920)  SpO2: 95 % (01/22/23 1130)    Physical Exam  Constitutional:       General: She is not in acute distress  Appearance: Normal appearance  She is normal weight  She is not ill-appearing or toxic-appearing  HENT:      Head: Normocephalic and atraumatic  Mouth/Throat:      Mouth: Mucous membranes are moist       Pharynx: Oropharynx is clear  Eyes:      General: No scleral icterus  Extraocular Movements: Extraocular movements intact  Conjunctiva/sclera: Conjunctivae normal       Pupils: Pupils are equal, round, and reactive to light  Cardiovascular:      Rate and Rhythm: Regular rhythm  Tachycardia present  Pulses: Normal pulses        Heart sounds: Normal heart sounds  No murmur heard  No gallop  Comments: Auscultation limited due to to large body habitus  Pulmonary:      Effort: Pulmonary effort is normal  No respiratory distress  Breath sounds: Normal breath sounds  No stridor  No wheezing, rhonchi or rales  Chest:      Chest wall: No tenderness  Abdominal:      General: Abdomen is flat  Bowel sounds are normal  There is no distension  Palpations: Abdomen is soft  Tenderness: There is no abdominal tenderness  Musculoskeletal:         General: Normal range of motion  Cervical back: Normal range of motion  Right lower leg: No edema  Left lower leg: No edema  Skin:     General: Skin is warm  Coloration: Skin is not jaundiced or pale  Findings: No rash  Neurological:      General: No focal deficit present  Mental Status: She is alert and oriented to person, place, and time  Mental status is at baseline  Cranial Nerves: No cranial nerve deficit  Sensory: No sensory deficit  Motor: No weakness     Psychiatric:         Mood and Affect: Mood normal          Behavior: Behavior normal           Additional Data:     Lab Results:  Results from last 7 days   Lab Units 01/22/23  0932   WBC Thousand/uL 15 76*   HEMOGLOBIN g/dL 12 5   HEMATOCRIT % 39 5   PLATELETS Thousands/uL 170   NEUTROS PCT % 83*   LYMPHS PCT % 8*   MONOS PCT % 8   EOS PCT % 0     Results from last 7 days   Lab Units 01/22/23  0932   SODIUM mmol/L 136   POTASSIUM mmol/L 4 5   CHLORIDE mmol/L 98   CO2 mmol/L 28   BUN mg/dL 34*   CREATININE mg/dL 1 55*   ANION GAP mmol/L 10   CALCIUM mg/dL 9 3   GLUCOSE RANDOM mg/dL 288*                 Results from last 7 days   Lab Units 01/22/23  1042   LACTIC ACID mmol/L 1 1   PROCALCITONIN ng/ml 0 13       Lines/Drains:  Invasive Devices     Peripheral Intravenous Line  Duration           Peripheral IV 01/22/23 Left Wrist <1 day    Peripheral IV 01/22/23 Right;Upper Arm <1 day Imaging: Reviewed radiology reports from this admission including: chest xray, abdominal/pelvic CT, CT head and CT spine  CT abdomen pelvis wo contrast   Final Result by Jose Antonio Enriquez MD (01/22 1012)      No acute traumatic injury in the abdomen or pelvis  Peripheral consolidative opacity in right lower lobe with scattered micronodular opacities in bilateral lower lobes, findings are suspicious for multifocal pneumonia  Recommend follow-up CT chest without contrast 8-12 weeks to ensure resolution  Additional chronic/incidental findings as detailed above  I personally discussed this study with Mounika Hart on 1/22/2023 at 10:09 AM                            Workstation performed: [id number]         TRAUMA - CT head wo contrast   Final Result by Jose Anotnio Enriquez MD (01/22 1013)      No acute intracranial abnormality  I personally discussed this study with Mounika Hart on 1/22/2023 at 10:09 AM                Workstation performed: [id number]         TRAUMA - CT spine cervical wo contrast   Final Result by Jose Antonio Enriquez MD (01/22 1013)      No cervical spine fracture or traumatic malalignment  I personally discussed this study with Mounika Hart on 1/22/2023 at 10:09 AM                       Workstation performed: [id number]         XR trauma multiple   Final Result by Jose Antonio Enriquez MD (01/22 1014)      Peripheral consolidative opacity in right lower lobe, suspicious for pneumonia  Please see same-day CT chest abdomen and pelvis without contrast for further evaluation  I personally discussed this study with Mounika Hart on 1/22/2023 at 10:09 AM                   Workstation performed: [id number]         XR chest 1 view    (Results Pending)       EKG and Other Studies Reviewed on Admission:   · EKG: Sinus Tachycardia    ** Please Note: This note has been constructed using a voice recognition system   **

## 2023-01-22 NOTE — ASSESSMENT & PLAN NOTE
No results found for: HGBA1C    No results for input(s): POCGLU in the last 72 hours  Blood Sugar Average: Last 72 hrs:    Blood sugar appears to be poorly controlled on this admission  Patient is maintained on Toujeo 25 units before breakfast and 25 units before bedtime along with mealtime Humalog  Patient admits to only checking her blood sugars and taking mealtime insulin at breakfast     Plan:   Toujeo 25 units before breakfast and 25 units before bedtime ordered  Sliding scale insulin ordered for mealtime insulin  Hypoglycemia protocol  Diabetic/cardiac diet ordered  Reinforced importance of blood glucose monitoring

## 2023-01-22 NOTE — ASSESSMENT & PLAN NOTE
Secondary to community-acquired pneumonia    Patient presents with leukocytosis, tachycardia and suspected source of right sided multifocal pneumonia  Please refer to A&P under community-acquired pneumonia

## 2023-01-22 NOTE — H&P
[address] 1950, 81 y o  female MRN: [id number]  Unit/Bed#: S -01 Encounter: [id number]  Primary Care Provider: Maureen Velez MD   Date and time admitted to hospital: 1/22/2023  9:17 AM    Trauma Alert: Level B   Model of Arrival: Ambulance    Trauma Team: Attending Higinio Fabry and NAT Epps PA-C  Consultants: [facility] contacted for admission after initial work-up    Assessment/Plan   Active Problems / Assessment:   1  Suspected pneumonia  2  Acute respiratory distress  3  Syncope fall  4  Forehead abrasion  5  History of paroxysmal atrial fibrillation  6  CKD stage III  7  History of diabetes     Plan:   -Admit to medical services at this time  -Follow-up initial sepsis work-up  -Patient initially tachycardic to 130s in ER  -Start ceftriaxone in setting of possible pneumonia  -Tetanus updated in setting of forehead abrasion  -Rest of labs pending; EKG revealing sinus tachycardia  -Will need sliding scale insulin with likely basilar and mealtime dosage  -Monitor kidney function  -Trauma to perform tertiary survey in a m  Cervical Collar Clearance: The patient had a CT scan of the cervical spine demonstrating no acute injury  On exam, the patient had no midline point tenderness or paresthesias/numbness/weakness in the extremities  The patient had full range of motion (was then able to flex, extend, and rotate head laterally) without pain  There were no distracting injuries and the patient was not intoxicated  The patient's cervical spine was cleared radiologically and clinically  Cervical collar removed at this time  Eric Forbes PA-C  1/22/2023 12:34 PM       Disposition: Trauma to perform tertiary survey in a m  No other acute work-up at this time  Rest of care per primary team   Cervical collar cleared      History of Present Illness     Chief Complaint: "I fell "  Mechanism:Fall     HPI:    David Potter is a 81 y o  female who presents with syncope fall in her bathroom  Significant past medical history of taking Eliquis in the setting of atrial fibrillation, CKD, CHF, diabetes type 2  Patient comes in after a fall in her bathroom  She does not recall the entire events  Became lightheaded and passed out on the ground  Comes in cooperative and pleasant  GCS of 15  Moving all extremities  No complaints of extremity pain  States she has a slight headache  States she has had a worsening cough that was productive over the course of the last 24 to 48 hours  Review of Systems   Constitutional: Positive for activity change, appetite change and fatigue  Negative for chills and fever  HENT: Negative  Eyes: Negative  Respiratory: Positive for cough and chest tightness  Cardiovascular: Negative  Gastrointestinal: Negative  Genitourinary: Negative  Musculoskeletal: Negative  Skin: Positive for wound  Neurological: Negative  Hematological: Negative  12-point, complete review of systems was reviewed and negative except as stated above  Historical Information     No past medical history on file  No past surgical history on file  Immunization History   Administered Date(s) Administered   • Tdap 01/22/2023     Last Tetanus: up to date  Family History: Non-contributory    1  Before the illness or injury that brought you to the Emergency, did you need someone to help you on a regular basis? 1=Yes   2  Since the illness or injury that brought you to the Emergency, have you needed more help than usual to take care of yourself? 1=Yes   3  Have you been hospitalized for one or more nights during the past 6 months (excluding a stay in the Emergency Department)? 1=Yes   4  In general, do you see well? 0=Yes   5  In general, do you have serious problems with your memory? 0=No   6  Do you take more than three different medications everyday?  1=Yes   TOTAL   4     Did you order a geriatric consult if the score was 2 or greater?:  At the discretion of the primary team     Meds/Allergies   current meds:   Current Facility-Administered Medications   Medication Dose Route Frequency   • acetaminophen (TYLENOL) tablet 650 mg  650 mg Oral Q6H PRN   • albuterol (PROVENTIL HFA,VENTOLIN HFA) inhaler 2 puff  2 puff Inhalation Q6H PRN   • apixaban (ELIQUIS) tablet 5 mg  5 mg Oral BID   • ceftriaxone (ROCEPHIN) 2 g/50 mL in dextrose IVPB  2,000 mg Intravenous Q24H   • fluticasone (FLONASE) 50 mcg/act nasal spray 1 spray  1 spray Nasal Daily   • guaiFENesin (MUCINEX) 12 hr tablet 600 mg  600 mg Oral Q12H [facility]   • insulin glargine (LANTUS) subcutaneous injection 25 Units 0 25 mL  25 Units Subcutaneous Q12H [facility]   • metoprolol succinate (TOPROL-XL) 24 hr tablet 50 mg  50 mg Oral Daily   • montelukast (SINGULAIR) tablet 10 mg  10 mg Oral HS   • multi-electrolyte (ISOLYTE-S PH 7 4) bolus 500 mL  500 mL Intravenous Once   • ondansetron (ZOFRAN-ODT) dispersible tablet 4 mg  4 mg Oral Q6H PRN   • polyethylene glycol (MIRALAX) packet 17 g  17 g Oral Daily PRN   • pravastatin (PRAVACHOL) tablet 80 mg  80 mg Oral Daily With Dinner   • pregabalin (LYRICA) capsule 100 mg  100 mg Oral BID   • torsemide (DEMADEX) tablet 40 mg  40 mg Oral Daily        Allergies   Allergen Reactions   • Penicillins Hives       Objective   Initial Vitals:   Temperature: 99 3 °F (37 4 °C) (01/22/23 0920)  Pulse: (!) 114 (01/22/23 0920)  Respirations: 18 (01/22/23 0920)  Blood Pressure: 155/79 (01/22/23 0920)    Primary Survey:   Airway:        Status: patent;        Pre-hospital Interventions: none        Hospital Interventions: none  Breathing:        Pre-hospital Interventions: none       Effort: normal       Right breath sounds: normal       Left breath sounds: normal  Circulation:        Rhythm: regular       Rate: regular   Right Pulses Left Pulses    R radial: 2+  R femoral: 2+  R pedal: 2+  R carotid: 2+  R popliteal: 2+ L radial: 2+  L femoral: 2+  L pedal: 2+  L carotid: 2+  L popliteal: 2+   Disability:        GCS: Eye: 4; Verbal: 5 Motor: 6 Total: 15       Right Pupil: round;  reactive         Left Pupil:  round;  reactive      R Motor Strength L Motor Strength    R : 5/5  R dorsiflex: 5/5  R plantarflex: 5/5 L : 5/5  L dorsiflex: 5/5  L plantarflex: 5/5        Sensory:  No sensory deficit  Exposure:       Completed: Yes      Secondary Survey:  Physical Exam  Vitals reviewed  Constitutional:       Appearance: Normal appearance  HENT:      Head:      Comments: Forehead abrasion appreciated on examination  Right Ear: External ear normal       Left Ear: External ear normal       Nose: Nose normal       Mouth/Throat:      Pharynx: Oropharynx is clear  Eyes:      Pupils: Pupils are equal, round, and reactive to light  Cardiovascular:      Rate and Rhythm: Regular rhythm  Tachycardia present  Pulses: Normal pulses  Heart sounds: Normal heart sounds  Pulmonary:      Effort: Respiratory distress present  Comments: Diminished breath sounds at the bases  Chest:      Chest wall: No tenderness  Abdominal:      General: There is no distension  Palpations: Abdomen is soft  Tenderness: There is no abdominal tenderness  There is no guarding  Musculoskeletal:         General: Normal range of motion  Cervical back: Normal range of motion  No tenderness  Skin:     General: Skin is warm and dry  Neurological:      General: No focal deficit present  Mental Status: She is alert and oriented to person, place, and time           Invasive Devices     Peripheral Intravenous Line  Duration           Peripheral IV 01/22/23 Left Wrist <1 day    Peripheral IV 01/22/23 Right;Upper Arm <1 day              Lab Results:   Results: I have personally reviewed all pertinent laboratory/tests results, BMP/CMP:   Lab Results   Component Value Date    SODIUM 136 01/22/2023    K 4 5 01/22/2023    CL 98 01/22/2023    CO2 28 01/22/2023    BUN 34 (H) 01/22/2023    CREATININE 1 55 (H) 01/22/2023    CALCIUM 9 3 01/22/2023    EGFR 33 01/22/2023    and CBC:   Lab Results   Component Value Date    WBC 15 76 (H) 01/22/2023    HGB 12 5 01/22/2023    HCT 39 5 01/22/2023    MCV 93 01/22/2023     01/22/2023    MCH 29 4 01/22/2023    MCHC 31 6 01/22/2023    RDW 13 7 01/22/2023    MPV 10 9 01/22/2023    NRBC 0 01/22/2023       Imaging Results: I have personally reviewed pertinent reports  Chest Xray(s): negative for acute findings   FAST exam(s): negative for acute findings   CT Scan(s): negative for acute findings   Additional Xray(s): negative for acute findings     Other Studies: no other studies    Code Status: Level 1 - Full Code  Advance Directive and Living Will:      Power of :    POLST:    I have spent 32 minutes with Patient and family today in which greater than 50% of this time was spent in counseling/coordination of care regarding Diagnostic results, Prognosis, Risks and benefits of tx options, Intructions for management, Patient and family education, Importance of tx compliance, Risk factor reductions and Impressions

## 2023-01-22 NOTE — ASSESSMENT & PLAN NOTE
Patient has history of open wound on right heel for which she follows up with wound care as outpatient  Family seems to be very supportive and helping keep the wound clean  Past medical history is significant for type 2 diabetes  Neurovascular exam of both feet appears to be intact  Wound on right heel is exposed but does not appear to be purulent or infected  Podiatry consult requested for further management  Recommendations are appreciated

## 2023-01-22 NOTE — ASSESSMENT & PLAN NOTE
Maintained on albuterol every 6 hours as needed, daily fluticasone, Singulair nightly  Patient uses 2 L nasal cannula oxygen at nighttime      Plan:  Continue home maintenance medications  Continue supplementary oxygen to maintain O2 sats between 88 and 92%

## 2023-01-22 NOTE — ASSESSMENT & PLAN NOTE
Lab Results   Component Value Date    EGFR 33 01/22/2023    CREATININE 1 55 (H) 01/22/2023   Baseline creatinine seems to range between 1 5 and 1 8    Trend BMP for worsening creatinine  Avoid hypotension and nephrotoxic agents  Monitor for changes in urinary output

## 2023-01-22 NOTE — ASSESSMENT & PLAN NOTE
Wt Readings from Last 3 Encounters:   01/22/23 125 kg (275 lb 9 2 oz)     Maintained on torsemide 40 mg daily  Stress test in August 2022 shows an ejection fraction of 70%  Patient appears to be euvolemic on this admission  Continue home medications  Monitor BMP  Magnesium level ordered  Cardiac diet ordered

## 2023-01-22 NOTE — PROCEDURES
POC FAST US    Date/Time: 1/22/2023 9:58 AM  Performed by: Merlinda Pitts, PA-C  Authorized by: Merlinda Pitts, PA-C     Patient location:  Trauma  Procedure details:     Exam Type:  Diagnostic    Indications: blunt abdominal trauma and blunt chest trauma      Assess for:  Intra-abdominal fluid and pericardial effusion    Technique: FAST      Views obtained:  Heart - Pericardial sac and RUQ - Sanon's Pouch    Image quality: diagnostic      Image availability:  Images available in PACS and video obtained  FAST Findings:     RUQ (Hepatorenal) free fluid: absent      LUQ (Splenorenal) free fluid: indeterminate      Suprapubic free fluid: indeterminate      Cardiac wall motion: identified      Pericardial effusion: absent    Interpretation:     Impressions: indeterminate    Comments: Indeterminate fast examination as unable to get full views of splenorenal region as well as unable to visualize bladder; patient recently urinated prior to arrival; suspect that bladder was decompressed

## 2023-01-22 NOTE — RESPIRATORY THERAPY NOTE
01/22/23 1405   Respiratory Protocol   Protocol Initiated? Yes   Protocol Selection Respiratory; Airway Clearance   Language Barrier? No   Medical & Social History Reviewed? Yes   Diagnostic Studies Reviewed? Yes   Physical Assessment Performed? Yes   Home Devices/Therapy Home O2;Other (Comment)  (uses 2L at night )   Airway Clearance Plan Incentive Spirometer   Respiratory Plan Mild Distress pathway   Respiratory Assessment   Assessment Type Assess only   General Appearance Alert; Awake   Respiratory Pattern Normal   Chest Assessment Chest expansion symmetrical   Bilateral Breath Sounds Clear;Diminished   Resp Comments Patient is here for a fall  Pt has a hx of COPD and CHF  Patient uses 2L at night but no oxygen during the day  She did say she had a cpap in the past but does not like it  Patient does not want to use a unit here for CPAP  Dose not use nebulizers  Does use a albuterol inhaler claims once every 2 weeks  Patient had clear diminished BS  Instructed on use of IS goal 1800 and achieved 250  Patient is currently on 2L NC at 96%     O2 Device 2L NC   Additional Assessments   SpO2 96 %

## 2023-01-22 NOTE — ASSESSMENT & PLAN NOTE
Maintained on Toprol-XL 50 mg daily and Eliquis 5 mg twice daily  Patient is presenting for syncopal episode  EKG on this admission shows sinus tachycardia with heart rate of 113, patient noted to be septic on this admission and she admits to not taking her metoprolol this morning  Plan:   Will place on telemetry to monitor for uncontrolled A  fib as potential source of syncope  Continue home medications  Monitor BMP

## 2023-01-22 NOTE — ASSESSMENT & PLAN NOTE
Patient has past medical history obstructive sleep apnea for which she is supposed to be using a CPAP at night  Patient reports poor compliance with CPAP due to discomfort with the mask  CPAP ordered for this admission  Patient will need referral to sleep medicine on discharge to discuss difficulties with compliance

## 2023-01-22 NOTE — ASSESSMENT & PLAN NOTE
Patient presents after syncopal episode while being on the toilet  Past medical history significant for COPD, CHF, obstructive sleep apnea with poor CPAP compliance  Granddaughter found patient unconscious on the floor  O2 sat at that time was reportedly 88%  Patient reports not feeling well for the past several days complaining of productive cough with green sputum  Patient denies fever, chills or sweats  No chest pain or complaints of being unusually short of breath reported  She is found to be tachycardic on arrival to the emergency department, patient does admit to not taking her metoprolol this morning  Work-up in the emergency department significant for leukocytosis and CT concerning for multifocal right sided pneumonia  Given 500 cc bolus and one-time dose of Rocephin in the emergency department  Physical exam mostly noncontributory due to large body habitus      Plan:  Continue Rocephin 1 g daily  Mucinex every 12 hours  Trend blood cultures  Sputum cultures ordered  Trend procalcitonin  Trend CBC  Continue supplementary oxygen to maintain O2 sat between 88 and 92%

## 2023-01-22 NOTE — ASSESSMENT & PLAN NOTE
Patient presents after syncopal episode while sitting on the toilet  Past medical history significant for atrial fibrillation maintained on metoprolol and Eliquis, CHF on torsemide 40 mg daily, and type 2 diabetes  Patient says she went to the bathroom earlier this morning and was found on the bathroom floor by her granddaughter  Patient does not recall prodromal symptoms prior to passing out but says she may have been trying to stand up  She denies straining just before losing consciousness  Granddaughter reports O2 saturation at the time of finding the patient was approximately 88%  Patient found to be tachycardic and hypertensive on POA but she did not take her metoprolol this morning  EKG shows sinus tachycardia  Troponins are negative  Stress test from August 2022 shows an ejection fraction of 70%  As of now there is an unclear etiology of why the patient lost consciousness, however tachyarrhythmia and orthostatic hypotension are certainly possibilities  She will need further work-up and observation      Plan:  Orthostatics ordered  Telemetry  Fall precautions ordered  Trend BMP

## 2023-01-22 NOTE — CONSULTS
Consult - Podiatry   Colt Merchant 81 y o  female MRN: [id number]  Unit/Bed#: S -01 Encounter: [id number]    Assessment/Plan     Assessment:  Right heel ulcer - noninfected    Plan:  Patient seen and examined at bedside  Ulcer appears clinically stable and noninfected  Right heel dressed with Maxorb Ag and Allevyn foam pad  Can change dressing every other day with wound care  No podiatric interventions warranted during this admission  Patient to follow up with wound care specialist after discharge  Podiatry will sign off       History of Present Illness     HPI:  Colt Merchant is a 81 y o  female who presents s/p fall with head strike at home  Podiatry consulted for evaluation of right heel wound  Patient states that the wound has been present for a few weeks and that she sees a wound care specialist for treatment  She endorses some tenderness to the area with palpation  Denies any other pedal complaints  Consults  Review of Systems   Constitutional: Negative  HENT: Negative  Eyes: Negative  Respiratory: Negative  Cardiovascular: Negative  Gastrointestinal: Negative  Musculoskeletal: Positive for tenderness with palpation to right heel   Skin:Positive for right heel ulcer   Neurological: negative  Psych: negative      Historical Information   No past medical history on file  No past surgical history on file  Social History   Social History     Substance and Sexual Activity   Alcohol Use Not Currently     Social History     Substance and Sexual Activity   Drug Use Never     Social History     Tobacco Use   Smoking Status Former   • Types: Cigarettes   Smokeless Tobacco Never     Family History: No family history on file      Meds/Allergies   Medications Prior to Admission   Medication   • albuterol (PROVENTIL HFA,VENTOLIN HFA) 90 mcg/act inhaler   • apixaban (Eliquis) 5 mg   • fluticasone (FLONASE) 50 mcg/act nasal spray   • insulin glargine (Toujeo SoloStar) 300 units/mL CONCENTRATED U-300 injection pen (1-unit dial)   • metoprolol succinate (TOPROL-XL) 50 mg 24 hr tablet   • montelukast (SINGULAIR) 10 mg tablet   • pregabalin (LYRICA) 100 mg capsule   • rosuvastatin (CRESTOR) 10 MG tablet   • torsemide (DEMADEX) 20 mg tablet     Allergies   Allergen Reactions   • Penicillins Hives       Objective   First Vitals:   Blood Pressure: 155/79 (01/22/23 0920)  Pulse: (!) 114 (01/22/23 0920)  Temperature: 99 3 °F (37 4 °C) (01/22/23 0920)  Temp Source: Oral (01/22/23 0920)  Respirations: 18 (01/22/23 0920)  Height: 5' 2" (157 5 cm) (01/22/23 8191)  Weight - Scale: 125 kg (275 lb 9 2 oz) (01/22/23 0920)  SpO2: 99 % (01/22/23 0920)    Current Vitals:   Blood Pressure: 130/60 (01/22/23 1526)  Pulse: 92 (01/22/23 1600)  Temperature: 100 4 °F (38 °C) (01/22/23 1514)  Temp Source: Oral (01/22/23 0920)  Respirations: 18 (01/22/23 1247)  Height: 5' 2" (157 5 cm) (01/22/23 7352)  Weight - Scale: 125 kg (275 lb 9 2 oz) (01/22/23 0920)  SpO2: 91 % (01/22/23 1514)        /60 (BP Location: Left arm)   Pulse 92   Temp 100 4 °F (38 °C)   Resp 18   Ht 5' 2" (1 575 m)   Wt 125 kg (275 lb 9 2 oz)   SpO2 91%   BMI 50 40 kg/m²   Physical Exam:  General:    Alert, cooperative, no distress   Head:     Normocephalic, without obvious abnormality, atraumatic                   Lower extremity:   PT and DP pulses diminished b/l  Right heel wound measures 1 5 x 1 0 cm with no depth  No drainage or malodor, no clinical signs of local infection  There is mild tenderness with direct palpation of the right heel ulcer  Left foot is negative for any open wounds or lesions                                                                Lab Results:   Admission on 01/22/2023   Component Date Value   • WBC 01/22/2023 15 76 (H)    • RBC 01/22/2023 4 25    • Hemoglobin 01/22/2023 12 5    • Hematocrit 01/22/2023 39 5    • MCV 01/22/2023 93    • MCH 01/22/2023 29 4    • MCHC 01/22/2023 31 6    • RDW 01/22/2023 13 7    • MPV 01/22/2023 10 9    • Platelets 00/64/4484 170    • nRBC 01/22/2023 0    • Neutrophils Relative 01/22/2023 83 (H)    • Immat GRANS % 01/22/2023 0    • Lymphocytes Relative 01/22/2023 8 (L)    • Monocytes Relative 01/22/2023 8    • Eosinophils Relative 01/22/2023 0    • Basophils Relative 01/22/2023 1    • Neutrophils Absolute 01/22/2023 13 10 (H)    • Immature Grans Absolute 01/22/2023 0 07    • Lymphocytes Absolute 01/22/2023 1 18    • Monocytes Absolute 01/22/2023 1 29 (H)    • Eosinophils Absolute 01/22/2023 0 03    • Basophils Absolute 01/22/2023 0 09    • Sodium 01/22/2023 136    • Potassium 01/22/2023 4 5    • Chloride 01/22/2023 98    • CO2 01/22/2023 28    • ANION GAP 01/22/2023 10    • BUN 01/22/2023 34 (H)    • Creatinine 01/22/2023 1 55 (H)    • Glucose 01/22/2023 288 (H)    • Calcium 01/22/2023 9 3    • eGFR 01/22/2023 33    • hs TnI 0hr 01/22/2023 6    • Magnesium 01/22/2023 1 7 (L)    • Phosphorus 01/22/2023 3 8    • Ventricular Rate 01/22/2023 113    • Atrial Rate 01/22/2023 113    • TN Interval 01/22/2023 168    • QRSD Interval 01/22/2023 78    • QT Interval 01/22/2023 324    • QTC Interval 01/22/2023 444    • P Axis 01/22/2023 73    • QRS Axis 01/22/2023 -32    • T Wave Axis 01/22/2023 75    • LACTIC ACID 01/22/2023 1 1    • hs TnI 2hr 01/22/2023 7    • Delta 2hr hsTnI 01/22/2023 1    • Procalcitonin 01/22/2023 0 13    • SARS-CoV-2 01/22/2023 Negative    • INFLUENZA A PCR 01/22/2023 Negative    • INFLUENZA B PCR 01/22/2023 Negative    • RSV PCR 01/22/2023 Negative    • hs TnI 4hr 01/22/2023 7    • Delta 4hr hsTnI 01/22/2023 1    • TSH 3RD GENERATON 01/22/2023 2  481    • Platelets 98/47/0285 170    • MPV 01/22/2023 10 7                              Imaging: I have personally reviewed pertinent films in PACS      Code Status: Level 1 - Full Code        Portions of the record may have been created with voice recognition software   Occasional wrong word or "sound a like" substitutions may have occurred due to the inherent limitations of voice recognition software  Read the chart carefully and recognize, using context, where substitutions have occurred

## 2023-01-23 PROBLEM — E66.01 MORBID OBESITY WITH BMI OF 50.0-59.9, ADULT (HCC): Status: ACTIVE | Noted: 2023-01-23

## 2023-01-23 PROBLEM — N17.9 AKI (ACUTE KIDNEY INJURY) (HCC): Status: ACTIVE | Noted: 2023-01-23

## 2023-01-23 LAB
ALBUMIN SERPL BCP-MCNC: 3.1 G/DL (ref 3.5–5)
ALBUMIN SERPL BCP-MCNC: 3.1 G/DL (ref 3.5–5)
ALP SERPL-CCNC: 54 U/L (ref 34–104)
ALP SERPL-CCNC: 54 U/L (ref 34–104)
ALT SERPL W P-5'-P-CCNC: 10 U/L (ref 7–52)
ALT SERPL W P-5'-P-CCNC: 10 U/L (ref 7–52)
ANION GAP SERPL CALCULATED.3IONS-SCNC: 7 MMOL/L (ref 4–13)
ANION GAP SERPL CALCULATED.3IONS-SCNC: 7 MMOL/L (ref 4–13)
AST SERPL W P-5'-P-CCNC: 14 U/L (ref 13–39)
AST SERPL W P-5'-P-CCNC: 14 U/L (ref 13–39)
ATRIAL RATE: 113 BPM
ATRIAL RATE: 113 BPM
BILIRUB SERPL-MCNC: 0.35 MG/DL (ref 0.2–1)
BILIRUB SERPL-MCNC: 0.35 MG/DL (ref 0.2–1)
BUN SERPL-MCNC: 38 MG/DL (ref 5–25)
BUN SERPL-MCNC: 38 MG/DL (ref 5–25)
CALCIUM ALBUM COR SERPL-MCNC: 9.1 MG/DL (ref 8.3–10.1)
CALCIUM ALBUM COR SERPL-MCNC: 9.1 MG/DL (ref 8.3–10.1)
CALCIUM SERPL-MCNC: 8.4 MG/DL (ref 8.4–10.2)
CALCIUM SERPL-MCNC: 8.4 MG/DL (ref 8.4–10.2)
CHLORIDE SERPL-SCNC: 99 MMOL/L (ref 96–108)
CHLORIDE SERPL-SCNC: 99 MMOL/L (ref 96–108)
CK SERPL-CCNC: 101 U/L (ref 26–192)
CK SERPL-CCNC: 101 U/L (ref 26–192)
CO2 SERPL-SCNC: 32 MMOL/L (ref 21–32)
CO2 SERPL-SCNC: 32 MMOL/L (ref 21–32)
CREAT SERPL-MCNC: 2.57 MG/DL (ref 0.6–1.3)
CREAT SERPL-MCNC: 2.57 MG/DL (ref 0.6–1.3)
ERYTHROCYTE [DISTWIDTH] IN BLOOD BY AUTOMATED COUNT: 14.1 % (ref 11.6–15.1)
ERYTHROCYTE [DISTWIDTH] IN BLOOD BY AUTOMATED COUNT: 14.1 % (ref 11.6–15.1)
EST. AVERAGE GLUCOSE BLD GHB EST-MCNC: 269 MG/DL
EST. AVERAGE GLUCOSE BLD GHB EST-MCNC: 269 MG/DL
GFR SERPL CREATININE-BSD FRML MDRD: 18 ML/MIN/1.73SQ M
GFR SERPL CREATININE-BSD FRML MDRD: 18 ML/MIN/1.73SQ M
GLUCOSE SERPL-MCNC: 109 MG/DL (ref 65–140)
GLUCOSE SERPL-MCNC: 109 MG/DL (ref 65–140)
GLUCOSE SERPL-MCNC: 137 MG/DL (ref 65–140)
GLUCOSE SERPL-MCNC: 137 MG/DL (ref 65–140)
GLUCOSE SERPL-MCNC: 151 MG/DL (ref 65–140)
GLUCOSE SERPL-MCNC: 151 MG/DL (ref 65–140)
GLUCOSE SERPL-MCNC: 154 MG/DL (ref 65–140)
GLUCOSE SERPL-MCNC: 154 MG/DL (ref 65–140)
GLUCOSE SERPL-MCNC: 162 MG/DL (ref 65–140)
GLUCOSE SERPL-MCNC: 162 MG/DL (ref 65–140)
GLUCOSE SERPL-MCNC: 180 MG/DL (ref 65–140)
GLUCOSE SERPL-MCNC: 180 MG/DL (ref 65–140)
GLUCOSE SERPL-MCNC: 284 MG/DL (ref 65–140)
GLUCOSE SERPL-MCNC: 284 MG/DL (ref 65–140)
HBA1C MFR BLD: 11 %
HBA1C MFR BLD: 11 %
HCT VFR BLD AUTO: 33.5 % (ref 34.8–46.1)
HCT VFR BLD AUTO: 33.5 % (ref 34.8–46.1)
HGB BLD-MCNC: 10.5 G/DL (ref 11.5–15.4)
HGB BLD-MCNC: 10.5 G/DL (ref 11.5–15.4)
MAGNESIUM SERPL-MCNC: 2.4 MG/DL (ref 1.9–2.7)
MAGNESIUM SERPL-MCNC: 2.4 MG/DL (ref 1.9–2.7)
MCH RBC QN AUTO: 30.3 PG (ref 26.8–34.3)
MCH RBC QN AUTO: 30.3 PG (ref 26.8–34.3)
MCHC RBC AUTO-ENTMCNC: 31.3 G/DL (ref 31.4–37.4)
MCHC RBC AUTO-ENTMCNC: 31.3 G/DL (ref 31.4–37.4)
MCV RBC AUTO: 97 FL (ref 82–98)
MCV RBC AUTO: 97 FL (ref 82–98)
P AXIS: 73 DEGREES
P AXIS: 73 DEGREES
PLATELET # BLD AUTO: 166 THOUSANDS/UL (ref 149–390)
PLATELET # BLD AUTO: 166 THOUSANDS/UL (ref 149–390)
PMV BLD AUTO: 10.5 FL (ref 8.9–12.7)
PMV BLD AUTO: 10.5 FL (ref 8.9–12.7)
POTASSIUM SERPL-SCNC: 4.6 MMOL/L (ref 3.5–5.3)
POTASSIUM SERPL-SCNC: 4.6 MMOL/L (ref 3.5–5.3)
PR INTERVAL: 168 MS
PR INTERVAL: 168 MS
PROCALCITONIN SERPL-MCNC: 1.28 NG/ML
PROCALCITONIN SERPL-MCNC: 1.28 NG/ML
PROT SERPL-MCNC: 6.6 G/DL (ref 6.4–8.4)
PROT SERPL-MCNC: 6.6 G/DL (ref 6.4–8.4)
QRS AXIS: -32 DEGREES
QRS AXIS: -32 DEGREES
QRSD INTERVAL: 78 MS
QRSD INTERVAL: 78 MS
QT INTERVAL: 324 MS
QT INTERVAL: 324 MS
QTC INTERVAL: 444 MS
QTC INTERVAL: 444 MS
RBC # BLD AUTO: 3.46 MILLION/UL (ref 3.81–5.12)
RBC # BLD AUTO: 3.46 MILLION/UL (ref 3.81–5.12)
SODIUM SERPL-SCNC: 138 MMOL/L (ref 135–147)
SODIUM SERPL-SCNC: 138 MMOL/L (ref 135–147)
T WAVE AXIS: 75 DEGREES
T WAVE AXIS: 75 DEGREES
VENTRICULAR RATE: 113 BPM
VENTRICULAR RATE: 113 BPM
WBC # BLD AUTO: 15.36 THOUSAND/UL (ref 4.31–10.16)
WBC # BLD AUTO: 15.36 THOUSAND/UL (ref 4.31–10.16)

## 2023-01-23 RX ORDER — SODIUM CHLORIDE, SODIUM GLUCONATE, SODIUM ACETATE, POTASSIUM CHLORIDE, MAGNESIUM CHLORIDE, SODIUM PHOSPHATE, DIBASIC, AND POTASSIUM PHOSPHATE .53; .5; .37; .037; .03; .012; .00082 G/100ML; G/100ML; G/100ML; G/100ML; G/100ML; G/100ML; G/100ML
100 INJECTION, SOLUTION INTRAVENOUS CONTINUOUS
Status: DISCONTINUED | OUTPATIENT
Start: 2023-01-23 | End: 2023-01-24

## 2023-01-23 RX ORDER — INSULIN GLARGINE 100 [IU]/ML
20 INJECTION, SOLUTION SUBCUTANEOUS EVERY 12 HOURS SCHEDULED
Status: DISCONTINUED | OUTPATIENT
Start: 2023-01-23 | End: 2023-01-23

## 2023-01-23 RX ORDER — INSULIN LISPRO 100 [IU]/ML
2-12 INJECTION, SOLUTION INTRAVENOUS; SUBCUTANEOUS
Status: DISCONTINUED | OUTPATIENT
Start: 2023-01-23 | End: 2023-01-27 | Stop reason: HOSPADM

## 2023-01-23 RX ORDER — GUAIFENESIN 600 MG/1
1200 TABLET, EXTENDED RELEASE ORAL EVERY 12 HOURS SCHEDULED
Status: DISCONTINUED | OUTPATIENT
Start: 2023-01-23 | End: 2023-01-27 | Stop reason: HOSPADM

## 2023-01-23 RX ORDER — INSULIN GLARGINE 100 [IU]/ML
25 INJECTION, SOLUTION SUBCUTANEOUS EVERY 12 HOURS SCHEDULED
Status: DISCONTINUED | OUTPATIENT
Start: 2023-01-23 | End: 2023-01-23

## 2023-01-23 RX ORDER — SODIUM CHLORIDE, SODIUM GLUCONATE, SODIUM ACETATE, POTASSIUM CHLORIDE, MAGNESIUM CHLORIDE, SODIUM PHOSPHATE, DIBASIC, AND POTASSIUM PHOSPHATE .53; .5; .37; .037; .03; .012; .00082 G/100ML; G/100ML; G/100ML; G/100ML; G/100ML; G/100ML; G/100ML
1000 INJECTION, SOLUTION INTRAVENOUS ONCE
Status: COMPLETED | OUTPATIENT
Start: 2023-01-23 | End: 2023-01-23

## 2023-01-23 RX ORDER — SODIUM CHLORIDE, SODIUM GLUCONATE, SODIUM ACETATE, POTASSIUM CHLORIDE, MAGNESIUM CHLORIDE, SODIUM PHOSPHATE, DIBASIC, AND POTASSIUM PHOSPHATE .53; .5; .37; .037; .03; .012; .00082 G/100ML; G/100ML; G/100ML; G/100ML; G/100ML; G/100ML; G/100ML
500 INJECTION, SOLUTION INTRAVENOUS ONCE
Status: COMPLETED | OUTPATIENT
Start: 2023-01-23 | End: 2023-01-23

## 2023-01-23 RX ORDER — LEVALBUTEROL 1.25 MG/.5ML
1.25 SOLUTION, CONCENTRATE RESPIRATORY (INHALATION) EVERY 8 HOURS PRN
Status: DISCONTINUED | OUTPATIENT
Start: 2023-01-23 | End: 2023-01-27 | Stop reason: HOSPADM

## 2023-01-23 RX ORDER — METOPROLOL SUCCINATE 50 MG/1
50 TABLET, EXTENDED RELEASE ORAL DAILY
Status: DISCONTINUED | OUTPATIENT
Start: 2023-01-24 | End: 2023-01-23

## 2023-01-23 RX ORDER — INSULIN GLARGINE 100 [IU]/ML
35 INJECTION, SOLUTION SUBCUTANEOUS
Status: DISCONTINUED | OUTPATIENT
Start: 2023-01-23 | End: 2023-01-26

## 2023-01-23 RX ADMIN — INSULIN GLARGINE 35 UNITS: 100 INJECTION, SOLUTION SUBCUTANEOUS at 21:00

## 2023-01-23 RX ADMIN — ACETAMINOPHEN 650 MG: 325 TABLET, FILM COATED ORAL at 23:40

## 2023-01-23 RX ADMIN — INSULIN LISPRO 2 UNITS: 100 INJECTION, SOLUTION INTRAVENOUS; SUBCUTANEOUS at 12:08

## 2023-01-23 RX ADMIN — PRAVASTATIN SODIUM 80 MG: 80 TABLET ORAL at 17:27

## 2023-01-23 RX ADMIN — SODIUM CHLORIDE, SODIUM GLUCONATE, SODIUM ACETATE, POTASSIUM CHLORIDE, MAGNESIUM CHLORIDE, SODIUM PHOSPHATE, DIBASIC, AND POTASSIUM PHOSPHATE 100 ML/HR: .53; .5; .37; .037; .03; .012; .00082 INJECTION, SOLUTION INTRAVENOUS at 08:53

## 2023-01-23 RX ADMIN — APIXABAN 5 MG: 5 TABLET, FILM COATED ORAL at 08:53

## 2023-01-23 RX ADMIN — SODIUM CHLORIDE, SODIUM GLUCONATE, SODIUM ACETATE, POTASSIUM CHLORIDE, MAGNESIUM CHLORIDE, SODIUM PHOSPHATE, DIBASIC, AND POTASSIUM PHOSPHATE 1000 ML: .53; .5; .37; .037; .03; .012; .00082 INJECTION, SOLUTION INTRAVENOUS at 07:15

## 2023-01-23 RX ADMIN — SODIUM CHLORIDE, SODIUM GLUCONATE, SODIUM ACETATE, POTASSIUM CHLORIDE, MAGNESIUM CHLORIDE, SODIUM PHOSPHATE, DIBASIC, AND POTASSIUM PHOSPHATE 100 ML/HR: .53; .5; .37; .037; .03; .012; .00082 INJECTION, SOLUTION INTRAVENOUS at 21:00

## 2023-01-23 RX ADMIN — PREGABALIN 100 MG: 100 CAPSULE ORAL at 08:53

## 2023-01-23 RX ADMIN — CEFTRIAXONE SODIUM 2000 MG: 10 INJECTION, POWDER, FOR SOLUTION INTRAVENOUS at 12:01

## 2023-01-23 RX ADMIN — GUAIFENESIN 1200 MG: 600 TABLET ORAL at 08:52

## 2023-01-23 RX ADMIN — GUAIFENESIN 1200 MG: 600 TABLET ORAL at 21:00

## 2023-01-23 RX ADMIN — INSULIN LISPRO 2 UNITS: 100 INJECTION, SOLUTION INTRAVENOUS; SUBCUTANEOUS at 21:46

## 2023-01-23 RX ADMIN — MONTELUKAST 10 MG: 10 TABLET, FILM COATED ORAL at 21:00

## 2023-01-23 RX ADMIN — INSULIN LISPRO 2 UNITS: 100 INJECTION, SOLUTION INTRAVENOUS; SUBCUTANEOUS at 17:27

## 2023-01-23 RX ADMIN — PREGABALIN 100 MG: 100 CAPSULE ORAL at 17:27

## 2023-01-23 RX ADMIN — SODIUM CHLORIDE, SODIUM GLUCONATE, SODIUM ACETATE, POTASSIUM CHLORIDE, MAGNESIUM CHLORIDE, SODIUM PHOSPHATE, DIBASIC, AND POTASSIUM PHOSPHATE 500 ML: .53; .5; .37; .037; .03; .012; .00082 INJECTION, SOLUTION INTRAVENOUS at 04:10

## 2023-01-23 RX ADMIN — ACETAMINOPHEN 650 MG: 325 TABLET, FILM COATED ORAL at 14:29

## 2023-01-23 RX ADMIN — APIXABAN 5 MG: 5 TABLET, FILM COATED ORAL at 17:27

## 2023-01-23 NOTE — ASSESSMENT & PLAN NOTE
Wt Readings from Last 3 Encounters:   01/22/23 125 kg (275 lb 9 2 oz)     Maintained on torsemide 40 mg daily  Stress test in August 2022 shows an ejection fraction of 70%  Patient appears to be euvolemic on this admission  Continue home medications  Monitor BMP  Magnesium level ordered  Cardiac diet ordered      Plan  Hold torsemide in the setting of NATALIA  Assess volume status daily

## 2023-01-23 NOTE — ASSESSMENT & PLAN NOTE
No results found for: HGBA1C    Recent Labs     01/22/23 2034 01/22/23  2208 01/23/23  0003 01/23/23  0336   POCGLU 467* 383* 284* 154*       Blood Sugar Average: Last 72 hrs:  (P) 339 5  Blood sugar appears to be poorly controlled on this admission  Patient is maintained on Toujeo 25 units before breakfast and 25 units before bedtime along with mealtime Humalog  Patient admits to only checking her blood sugars and taking mealtime insulin at breakfast     Plan:   Toujeo 25 units before breakfast and 25 units before bedtime ordered  Sliding scale insulin ordered for mealtime insulin  Hypoglycemia protocol  Diabetic/cardiac diet ordered  Reinforced importance of blood glucose monitoring

## 2023-01-23 NOTE — PLAN OF CARE
Problem: MOBILITY - ADULT  Goal: Maintain or return to baseline ADL function  Description: INTERVENTIONS:  -  Assess patient's ability to carry out ADLs; assess patient's baseline for ADL function and identify physical deficits which impact ability to perform ADLs (bathing, care of mouth/teeth, toileting, grooming, dressing, etc )  - Assess/evaluate cause of self-care deficits   - Assess range of motion  - Assess patient's mobility; develop plan if impaired  - Assess patient's need for assistive devices and provide as appropriate  - Encourage maximum independence but intervene and supervise when necessary  - Involve family in performance of ADLs  - Assess for home care needs following discharge   - Consider OT consult to assist with ADL evaluation and planning for discharge  - Provide patient education as appropriate  Outcome: Progressing  Goal: Maintains/Returns to pre admission functional level  Description: INTERVENTIONS:  - Perform BMAT or MOVE assessment daily    - Set and communicate daily mobility goal to care team and patient/family/caregiver     - Collaborate with rehabilitation services on mobility goals if consulted  - Out of bed for toileting  - Record patient progress and toleration of activity level   Outcome: Progressing     Problem: Prexisting or High Potential for Compromised Skin Integrity  Goal: Skin integrity is maintained or improved  Description: INTERVENTIONS:  - Identify patients at risk for skin breakdown  - Assess and monitor skin integrity  - Assess and monitor nutrition and hydration status  - Monitor labs   - Assess for incontinence   - Turn and reposition patient  - Assist with mobility/ambulation  - Relieve pressure over bony prominences  - Avoid friction and shearing  - Provide appropriate hygiene as needed including keeping skin clean and dry  - Evaluate need for skin moisturizer/barrier cream  - Collaborate with interdisciplinary team   - Patient/family teaching  - Consider wound care consult   Outcome: Progressing     Problem: Nutrition/Hydration-ADULT  Goal: Nutrient/Hydration intake appropriate for improving, restoring or maintaining nutritional needs  Description: Monitor and assess patient's nutrition/hydration status for malnutrition  Collaborate with interdisciplinary team and initiate plan and interventions as ordered  Monitor patient's weight and dietary intake as ordered or per policy  Utilize nutrition screening tool and intervene as necessary  Determine patient's food preferences and provide high-protein, high-caloric foods as appropriate       INTERVENTIONS:  - Monitor oral intake, urinary output, labs, and treatment plans  - Assess nutrition and hydration status and recommend course of action  - Evaluate amount of meals eaten  - Assist patient with eating if necessary   - Allow adequate time for meals  - Recommend/ encourage appropriate diets, oral nutritional supplements, and vitamin/mineral supplements  - Order, calculate, and assess calorie counts as needed  - Recommend, monitor, and adjust tube feedings and TPN/PPN based on assessed needs  - Assess need for intravenous fluids  - Provide specific nutrition/hydration education as appropriate  - Include patient/family/caregiver in decisions related to nutrition  Outcome: Progressing     Problem: PAIN - ADULT  Goal: Verbalizes/displays adequate comfort level or baseline comfort level  Description: Interventions:  - Encourage patient to monitor pain and request assistance  - Assess pain using appropriate pain scale  - Administer analgesics based on type and severity of pain and evaluate response  - Implement non-pharmacological measures as appropriate and evaluate response  - Consider cultural and social influences on pain and pain management  - Notify physician/advanced practitioner if interventions unsuccessful or patient reports new pain  Outcome: Progressing     Problem: INFECTION - ADULT  Goal: Absence or prevention of progression during hospitalization  Description: INTERVENTIONS:  - Assess and monitor for signs and symptoms of infection  - Monitor lab/diagnostic results  - Monitor all insertion sites, i e  indwelling lines, tubes, and drains  - Monitor endotracheal if appropriate and nasal secretions for changes in amount and color  - [place] appropriate cooling/warming therapies per order  - Administer medications as ordered  - Instruct and encourage patient and family to use good hand hygiene technique  - Identify and instruct in appropriate isolation precautions for identified infection/condition  Outcome: Progressing  Goal: Absence of fever/infection during neutropenic period  Description: INTERVENTIONS:  - Monitor WBC    Outcome: Progressing     Problem: SAFETY ADULT  Goal: Maintain or return to baseline ADL function  Description: INTERVENTIONS:  -  Assess patient's ability to carry out ADLs; assess patient's baseline for ADL function and identify physical deficits which impact ability to perform ADLs (bathing, care of mouth/teeth, toileting, grooming, dressing, etc )  - Assess/evaluate cause of self-care deficits   - Assess range of motion  - Assess patient's mobility; develop plan if impaired  - Assess patient's need for assistive devices and provide as appropriate  - Encourage maximum independence but intervene and supervise when necessary  - Involve family in performance of ADLs  - Assess for home care needs following discharge   - Consider OT consult to assist with ADL evaluation and planning for discharge  - Provide patient education as appropriate  Outcome: Progressing  Goal: Maintains/Returns to pre admission functional level  Description: INTERVENTIONS:  - Perform BMAT or MOVE assessment daily    - Set and communicate daily mobility goal to care team and patient/family/caregiver     - Collaborate with rehabilitation services on mobility goals if consulted  - Out of bed for toileting  - Record patient progress and toleration of activity level   Outcome: Progressing  Goal: Patient will remain free of falls  Description: INTERVENTIONS:  - Educate patient/family on patient safety including physical limitations  - Instruct patient to call for assistance with activity   - Consult OT/PT to assist with strengthening/mobility   - Keep Call bell within reach  - Keep bed low and locked with side rails adjusted as appropriate  - Keep care items and personal belongings within reach  - Initiate and maintain comfort rounds  - Make Fall Risk Sign visible to staff  - Apply yellow socks and bracelet for high fall risk patients  - Consider moving patient to room near nurses station  Outcome: Progressing     Problem: DISCHARGE PLANNING  Goal: Discharge to home or other facility with appropriate resources  Description: INTERVENTIONS:  - Identify barriers to discharge w/patient and caregiver  - Arrange for needed discharge resources and transportation as appropriate  - Identify discharge learning needs (meds, wound care, etc )  - Arrange for interpretive services to assist at discharge as needed  - Refer to Case Management Department for coordinating discharge planning if the patient needs post-hospital services based on physician/advanced practitioner order or complex needs related to functional status, cognitive ability, or social support system  Outcome: Progressing     Problem: Knowledge Deficit  Goal: Patient/family/caregiver demonstrates understanding of disease process, treatment plan, medications, and discharge instructions  Description: Complete learning assessment and assess knowledge base    Interventions:  - Provide teaching at level of understanding  - Provide teaching via preferred learning methods  Outcome: Progressing

## 2023-01-23 NOTE — ASSESSMENT & PLAN NOTE
- Status post fall with no acute traumatic injuries  -PT OT evaluation and treatment as indicated  - Tertiary trauma exam without additional traumatic injuries found  - Trauma team will sign off

## 2023-01-23 NOTE — PROGRESS NOTES
[facility]  Progress Note Татьяна Grant 1950, 81 y o  female MRN: [id number]  Unit/Bed#: S -01 Encounter: [id number]  Primary Care Provider: Hawk Haley MD   Date and time admitted to hospital: 1/22/2023  9:17 AM    * Pneumonia  Assessment & Plan  Patient presents after syncopal episode while being on the toilet  Past medical history significant for COPD, CHF, obstructive sleep apnea with poor CPAP compliance  Granddaughter found patient unconscious on the floor  O2 sat at that time was reportedly 88%  Patient reports not feeling well for the past several days complaining of productive cough with green sputum  Patient denies fever, chills or sweats  No chest pain or complaints of being unusually short of breath reported  She is found to be tachycardic on arrival to the emergency department, patient does admit to not taking her metoprolol this morning  Work-up in the emergency department significant for leukocytosis and CT concerning for multifocal right sided pneumonia  Given 500 cc bolus and one-time dose of Rocephin in the emergency department  Physical exam mostly noncontributory due to large body habitus  Plan:  Continue Rocephin 2g daily  Mucinex 1200  every 12 hours  Incentive spirometry and flutter valve  Trend blood cultures  Sputum cultures ordered  Trend procalcitonin  Trend CBC  Continue supplementary oxygen to maintain O2 sat between 88 and 92%      Sepsis ([address] )  Assessment & Plan  Secondary to community-acquired pneumonia    Patient presents with leukocytosis, tachycardia and suspected source of right sided multifocal pneumonia  Please refer to A&P under community-acquired pneumonia    Plan    Ceftriaxone 2 g in 24 hours  Fluids normal saline 100 cc/h  Follow-up blood cultures sputum cultures  Monitor temperature curve, CBC, trend Pro-Javed,      Morbid obesity with BMI of 50 0-59 9, adult [facility])  Assessment & Plan    Follow up with PCP for weight loss management at [place]    NATALIA (acute kidney injury) [facility])  Assessment & Plan  Recent Labs     01/22/23  0932 01/23/23  0359   CREATININE 1 55* 2 57*   EGFR 33 18     Estimated Creatinine Clearance: 24 7 mL/min (A) (by C-G formula based on SCr of 2 57 mg/dL (H))  Baseline 1 5  NATALIA secondary to sepsis and hypotension    Plan-  - isolyte  100cc/h  -Monitor I's/O  And  Daily BMP I  -consider nephrology consultation if worsening kidney fxn    Open wound  Assessment & Plan  Patient has history of open wound on right heel for which she follows up with wound care as outpatient  Family seems to be very supportive and helping keep the wound clean  Past medical history is significant for type 2 diabetes  Neurovascular exam of both feet appears to be intact  Wound on right heel is exposed but does not appear to be purulent or infected  Plan  Podiatry consult requested for further management  Recommendations are appreciated as below  Ulcer appears clinically stable and noninfected  Right heel dressed with Maxorb Ag and Allevyn foam pad  Can change dressing every other day with wound care  No podiatric interventions warranted during this admission    Obstructive sleep apnea  Assessment & Plan  Patient has past medical history obstructive sleep apnea for which she is supposed to be using a CPAP at night  Patient reports poor compliance with CPAP due to discomfort with the mask  CPAP ordered for this admission  Patient will need referral to sleep medicine on discharge to discuss difficulties with compliance        Hypertension  Assessment & Plan  Maintained on Toprol-XL 50 mg and torsemide 40 mg daily  Hold above meds for now- 1/23/2023      Consider restarting metoprolol 12 5 daily-if no hypotension    CKD (chronic kidney disease)  Assessment & Plan  Lab Results   Component Value Date    EGFR 18 01/23/2023    EGFR 33 01/22/2023    CREATININE 2 57 (H) 01/23/2023    CREATININE 1 55 (H) 01/22/2023   Baseline creatinine seems to range between 1 5 and 1 8  Trend BMP for worsening creatinine  Avoid hypotension and nephrotoxic agents  Monitor for changes in urinary output    Atrial fibrillation ([facility])  Assessment & Plan  Maintained on Toprol-XL 50 mg daily and Eliquis 5 mg twice daily  Patient is presenting for syncopal episode  EKG on this admission shows sinus tachycardia with heart rate of 113, patient noted to be septic on this admission and she admits to not taking her metoprolol this morning  Plan: Will place on telemetry to monitor rates closely  Hold Toprol incidental hypotension on 1/23/2023   consider metoprolol 12 5 twice daily if blood pressure stable  Monitor BMP  Mag >2, k>4        CHF (congestive heart failure) ([facility])  Assessment & Plan  Wt Readings from Last 3 Encounters:   01/23/23 117 kg (257 lb 8 oz)     Maintained on torsemide 40 mg daily  Stress test in August 2022 shows an ejection fraction of 70%  Patient appears to be euvolemic on this admission  Continue home medications  Monitor BMP  Magnesium level ordered  Cardiac diet ordered  Plan  Hold torsemide in the setting of NATALIA  Assess volume status daily    Diabetes [facility])  Assessment & Plan  Lab Results   Component Value Date    HGBA1C 11 0 (H) 01/23/2023       Recent Labs     01/23/23  0723 01/23/23  1114 01/23/23  1642 01/23/23  2044   POCGLU 109 151* 162* 180*       Blood Sugar Average: Last 72 hrs:  (P) 263 9  Blood sugar appears to be poorly controlled on this admission  Patient is maintained on Toujeo 25 units before breakfast and 25 units before bedtime along with mealtime Humalog    Patient admits to only checking her blood sugars and taking mealtime insulin at breakfast     Plan: HBA1C- 11%  Toujeo 35 units daily  Sliding scale insulin ordered for mealtime insulin  Hypoglycemia protocol  Diabetic/cardiac diet ordered  Reinforced importance of blood glucose monitoring    COPD (chronic obstructive pulmonary disease) ([facility])  Assessment & Plan  Maintained on albuterol every 6 hours as needed, daily fluticasone, Singulair nightly  Patient uses 2 L nasal cannula oxygen at nighttime  Plan:  Continue home maintenance medications- singulair  Prn xopenex on 1/23/2023  Continue supplementary oxygen to maintain O2 sats between 88 and 92% and wean as tolerated     Syncope  Assessment & Plan  Patient presents after syncopal episode while sitting on the toilet  Past medical history significant for atrial fibrillation maintained on metoprolol and Eliquis, CHF on torsemide 40 mg daily, and type 2 diabetes  Patient says she went to the bathroom earlier this morning and was found on the bathroom floor by her granddaughter  Patient does not recall prodromal symptoms prior to passing out but says she may have been trying to stand up  She denies straining just before losing consciousness  Granddaughter reports O2 saturation at the time of finding the patient was approximately 88%  Patient found to be tachycardic and hypertensive on POA but she did not take her metoprolol this morning  EKG shows sinus tachycardia  Troponins are negative  Stress test from August 2022 shows an ejection fraction of 70%  As of now there is an unclear etiology of why the patient lost consciousness, however tachyarrhythmia and orthostatic hypotension are certainly possibilities  She will need further work-up and observation  Plan:  Orthostatics ordered  Telemetry  Fall precautions ordered  Trend BMP        Fall  Assessment & Plan  Fall following syncopal episode at home, home pulse ox 88%  CT head and trauma series negative    PLAN  PT OT  Fall precautions        VTE Pharmacologic Prophylaxis: VTE Score: 9 High Risk (Score >/= 5) - Pharmacological DVT Prophylaxis Ordered: heparin  Sequential Compression Devices Ordered  Patient Centered Rounds: I performed bedside rounds with nursing staff today    Discussions with Specialists or Other Care Team Provider: Education and Discussions with Family / Patient: Updated  (sister) via phone  Current Length of Stay: 2 day(s)  Current Patient Status: Inpatient   Discharge Plan: Anticipate discharge in 48-72 hrs to home  Code Status: Level 1 - Full Code    Subjective:   Patient seen at bedside, reports improvement in SOB, cough productive of yellow sputum    Objective:     Vitals:   Temp (24hrs), Av 5 °F (36 9 °C), Min:98 3 °F (36 8 °C), Max:98 7 °F (37 1 °C)    Temp:  [98 3 °F (36 8 °C)-98 7 °F (37 1 °C)] 98 5 °F (36 9 °C)  HR:  [] 128  Resp:  [16-18] 17  BP: (110-139)/(46-56) 133/56  SpO2:  [92 %-97 %] 92 %  Body mass index is 47 1 kg/m²  Input and Output Summary (last 24 hours):   No intake or output data in the 24 hours ending 23 0640    Physical Exam:   Physical Exam  Vitals and nursing note reviewed  Constitutional:       General: She is not in acute distress  Appearance: She is well-developed  She is obese  She is not ill-appearing  HENT:      Head: Normocephalic and atraumatic  Eyes:      Conjunctiva/sclera: Conjunctivae normal    Cardiovascular:      Rate and Rhythm: Normal rate  Rhythm irregular  Heart sounds: No murmur heard  Pulmonary:      Effort: Pulmonary effort is normal  No respiratory distress  Breath sounds: Wheezing (mild) present  Abdominal:      Palpations: Abdomen is soft  Tenderness: There is no abdominal tenderness  Musculoskeletal:         General: No swelling  Cervical back: Neck supple  Skin:     General: Skin is warm and dry  Capillary Refill: Capillary refill takes less than 2 seconds  Neurological:      Mental Status: She is alert     Psychiatric:         Mood and Affect: Mood normal          Additional Data:     Labs:  Results from last 7 days   Lab Units 23  0404 23  1603 23  0932   WBC Thousand/uL 15 36*  --  15 76*   HEMOGLOBIN g/dL 10 5*  --  12 5   HEMATOCRIT % 33 5*  --  39 5   PLATELETS Thousands/uL 166   < > 170   NEUTROS PCT %  --   --  83*   LYMPHS PCT %  --   --  8*   MONOS PCT %  --   --  8   EOS PCT %  --   --  0    < > = values in this interval not displayed  Results from last 7 days   Lab Units 01/23/23  0359   SODIUM mmol/L 138   POTASSIUM mmol/L 4 6   CHLORIDE mmol/L 99   CO2 mmol/L 32   BUN mg/dL 38*   CREATININE mg/dL 2 57*   ANION GAP mmol/L 7   CALCIUM mg/dL 8 4   ALBUMIN g/dL 3 1*   TOTAL BILIRUBIN mg/dL 0 35   ALK PHOS U/L 54   ALT U/L 10   AST U/L 14   GLUCOSE RANDOM mg/dL 137         Results from last 7 days   Lab Units 01/23/23  2044 01/23/23  1642 01/23/23  1114 01/23/23  0723 01/23/23  0336 01/23/23  0003 01/22/23  2208 01/22/23  2034 01/22/23  2031 01/22/23  1657   POC GLUCOSE mg/dl 180* 162* 151* 109 154* 284* 383* 467* 423* 326*     Results from last 7 days   Lab Units 01/23/23  1722   HEMOGLOBIN A1C % 11 0*     Results from last 7 days   Lab Units 01/23/23  0404 01/22/23  1042   LACTIC ACID mmol/L  --  1 1   PROCALCITONIN ng/ml 1 28* 0 13       Lines/Drains:  Invasive Devices     Peripheral Intravenous Line  Duration           Peripheral IV 01/22/23 Right;Upper Arm 1 day                  Telemetry:  Telemetry Orders (From admission, onward)             24 Hour Telemetry Monitoring  Continuous x 24 Hours ([place])        References:    Telemetry Guidelines   Question:  Reason for 24 Hour Telemetry  Answer:  Syncope suspected to be cardiac in origin                 Telemetry Reviewed: Atrial fibrillation  HR averaging 90  Indication for Continued Telemetry Use: Syncope             Imaging: Reviewed radiology reports from this admission including: chest CT scan and abdominal/pelvic CT    Recent Cultures (last 7 days):   Results from last 7 days   Lab Units 01/22/23  1042   BLOOD CULTURE  No Growth at 24 hrs  No Growth at 24 hrs         Last 24 Hours Medication List:   Current Facility-Administered Medications   Medication Dose Route Frequency Provider Last Rate   • acetaminophen  650 mg Oral Q6H PRN Vamshi Diaz MD     • apixaban  5 mg Oral BID Vamshi Diaz MD     • cefTRIAXone  2,000 mg Intravenous Q24H Augustus Gresham Stopped (01/23/23 1245)   • fluticasone  1 spray Nasal Daily Vamshi Diaz MD     • guaiFENesin  1,200 mg Oral Q12H [address] Ana Paula Edwards, DO     • insulin glargine  35 Units Subcutaneous HS Marilyn Stiles, DO     • insulin lispro  2-12 Units Subcutaneous 4x Daily (AC & HS) Tr Lutz DO     • levalbuterol  1 25 mg Nebulization Q8H PRN Tr Lutz DO     • montelukast  10 mg Oral HS Vamshi Diaz MD     • ondansetron  4 mg Oral Q6H PRN Ashleigh Martin PA-C     • polyethylene glycol  17 g Oral Daily PRN Vamshi Diaz MD     • pravastatin  80 mg Oral Daily With Mario Leonardo MD     • pregabalin  100 mg Oral BID Vamshi Diaz MD          Today, Patient Was Seen By: Clayton Chatman MD    **Please Note: This note may have been constructed using a voice recognition system  **

## 2023-01-23 NOTE — ASSESSMENT & PLAN NOTE
Lab Results   Component Value Date    EGFR 18 01/23/2023    EGFR 33 01/22/2023    CREATININE 2 57 (H) 01/23/2023    CREATININE 1 55 (H) 01/22/2023   Baseline creatinine seems to range between 1 5 and 1 8    Trend BMP for worsening creatinine  Avoid hypotension and nephrotoxic agents  Monitor for changes in urinary output

## 2023-01-23 NOTE — QUICK NOTE
Received a tiger text from RN stating Patients BP is 72/42 and patient seemed lethargic and had little slurring of speech  Examined patient at bedside, patient is AAOX3, She has bruising on the superior and lateral side of the right orbit which is tender to palpation  CN II through XII were examines and are intact  Upper and lower extremity motor 5/5 and sensation are intact  Her voice is coarse due to dry mouth and throat but speech is clear  Patient denies headaches, nausea, SOB or chest pain  Patients repeat BP is 92/42, she is saturating 94% on room air  Will give her 500 cc bolus as she appears clinically dry

## 2023-01-23 NOTE — ASSESSMENT & PLAN NOTE
Maintained on albuterol every 6 hours as needed, daily fluticasone, Singulair nightly  Patient uses 2 L nasal cannula oxygen at nighttime      Plan:  Continue home maintenance medications- singulair  Prn xopenex on 1/23/2023  Continue supplementary oxygen to maintain O2 sats between 88 and 92% and wean as tolerated

## 2023-01-23 NOTE — PROGRESS NOTES
[facility]  Progress Note Ale Mckeon 1950, 81 y o  female MRN: [id number]  Unit/Bed#: S -01 Encounter: [id number]  Primary Care Provider: Rodrigo Bernstein MD   Date and time admitted to hospital: 1/22/2023  9:17 AM    Fall  Assessment & Plan  - Status post fall with no acute traumatic injuries  -PT OT evaluation and treatment as indicated  - Tertiary trauma exam without additional traumatic injuries found  - Trauma team will sign off        TRAUMA TERTIARY SURVEY NOTE    VTE Prophylaxis: Eliquis    Disposition: Trauma team signed off    Code status:  Level 1 - Full Code    Consultants: IP CONSULT TO PODIATRY    Subjective   Transfer from: Not a transfer    Mechanism of Injury:Fall     Chief Complaint: No complaints    HPI/Last 24 hour events: Patient denies pain, reports she feels well was able to sleep well last night  She offers no other complaints     Objective   Vitals:   Temp:  [98 9 °F (37 2 °C)-100 5 °F (38 1 °C)] 99 1 °F (37 3 °C)  HR:  [] 66  Resp:  [18-22] 18  BP: ()/(41-80) 102/42    I/O     None           Physical Exam:   GENERAL APPEARANCE: No acute distress sleeping upright in bed easily awoken  NEURO: AAOx3, GCS 15, no focal neurodeficit  HEENT: PERRLA, EOMI, mucous membranes moist  CV: RRR, S1, S2 without murmur rub or gallop  LUNGS: Clear to auscultation bilaterally without wheezes rales or rhonchi  GI: Soft, obese, nontender, nondistended  : Voiding independently  MSK: Extremities nontender without deformity AROM without pain  SKIN: Warm, dry, intact    Invasive Devices     Peripheral Intravenous Line  Duration           Peripheral IV 01/22/23 Right;Upper Arm <1 day                   1  Before the illness or injury that brought you to the Emergency, did you need someone to help you on a regular basis? 0=No   2  Since the illness or injury that brought you to the Emergency, have you needed more help than usual to take care of yourself? 0=No   3  Have you been hospitalized for one or more nights during the past 6 months (excluding a stay in the Emergency Department)? 0=No   4  In general, do you see well? 0=Yes   5  In general, do you have serious problems with your memory? 0=No   6  Do you take more than three different medications everyday? 1=Yes   TOTAL   1     Did you order a geriatric consult if the score was 2 or greater?: no         Lab Results:   BMP/CMP:   Lab Results   Component Value Date    SODIUM 138 01/23/2023    K 4 6 01/23/2023    CL 99 01/23/2023    CO2 32 01/23/2023    BUN 38 (H) 01/23/2023    CREATININE 2 57 (H) 01/23/2023    CALCIUM 8 4 01/23/2023    AST 14 01/23/2023    ALT 10 01/23/2023    ALKPHOS 54 01/23/2023    EGFR 18 01/23/2023    and CBC:   Lab Results   Component Value Date    WBC 15 36 (H) 01/23/2023    HGB 10 5 (L) 01/23/2023    HCT 33 5 (L) 01/23/2023    MCV 97 01/23/2023     01/23/2023    MCH 30 3 01/23/2023    MCHC 31 3 (L) 01/23/2023    RDW 14 1 01/23/2023    MPV 10 5 01/23/2023    NRBC 0 01/22/2023       Imaging Results: I have personally reviewed pertinent reports      Chest Xray(s): positive for acute findings: Possible pneumonia with consolidation in the right lower lobe   FAST exam(s): N/A   CT Scan(s): positive for acute findings: Findings suspicious for multifocal pneumonia   Additional Xray(s): N/A     Other Studies: None

## 2023-01-23 NOTE — ASSESSMENT & PLAN NOTE
Maintained on Toprol-XL 50 mg daily and Eliquis 5 mg twice daily  Patient is presenting for syncopal episode  EKG on this admission shows sinus tachycardia with heart rate of 113, patient noted to be septic on this admission and she admits to not taking her metoprolol this morning  Plan:   Will place on telemetry to monitor rates closely  Hold Toprol incidental hypotension on 1/23/2023   consider metoprolol 12 5 twice daily if blood pressure stable  Monitor BMP  Mag >2, k>4

## 2023-01-23 NOTE — ASSESSMENT & PLAN NOTE
Recent Labs     01/22/23  0932 01/23/23  0359   CREATININE 1 55* 2 57*   EGFR 33 18     Estimated Creatinine Clearance: 25 8 mL/min (A) (by C-G formula based on SCr of 2 57 mg/dL (H))  Baseline 1 5  NATALIA secondary to sepsis and hypotension    Plan-  - isolyte  100cc/h  -Monitor I's/O   And  Daily BMP I  -consider nephrology consultation if worsening kidney fxn

## 2023-01-23 NOTE — ASSESSMENT & PLAN NOTE
Patient has history of open wound on right heel for which she follows up with wound care as outpatient  Family seems to be very supportive and helping keep the wound clean  Past medical history is significant for type 2 diabetes  Neurovascular exam of both feet appears to be intact  Wound on right heel is exposed but does not appear to be purulent or infected  Plan  Podiatry consult requested for further management    Recommendations are appreciated as below  Ulcer appears clinically stable and noninfected  Right heel dressed with Maxorb Ag and Allevyn foam pad  Can change dressing every other day with wound care  No podiatric interventions warranted during this admission

## 2023-01-23 NOTE — ASSESSMENT & PLAN NOTE
Secondary to community-acquired pneumonia    Patient presents with leukocytosis, tachycardia and suspected source of right sided multifocal pneumonia  Please refer to A&P under community-acquired pneumonia    Plan    Ceftriaxone 2 g in 24 hours  Fluids normal saline 100 cc/h  Follow-up blood cultures sputum cultures  Monitor temperature curve, CBC, trend Pro-Javed,

## 2023-01-23 NOTE — ASSESSMENT & PLAN NOTE
Fall following syncopal episode at home, home pulse ox 88%  CT head and trauma series negative    PLAN  PT OT  Fall precautions

## 2023-01-23 NOTE — ASSESSMENT & PLAN NOTE
Maintained on Toprol-XL 50 mg and torsemide 40 mg daily  Hold above meds for now- 1/23/2023      Consider restarting metoprolol 12 5 daily-if no hypotension

## 2023-01-24 ENCOUNTER — TELEPHONE (OUTPATIENT)
Dept: RADIOLOGY | Facility: HOSPITAL | Age: 73
End: 2023-01-24

## 2023-01-24 LAB
ALBUMIN SERPL BCP-MCNC: 3 G/DL (ref 3.5–5)
ALBUMIN SERPL BCP-MCNC: 3 G/DL (ref 3.5–5)
ALP SERPL-CCNC: 52 U/L (ref 34–104)
ALP SERPL-CCNC: 52 U/L (ref 34–104)
ALT SERPL W P-5'-P-CCNC: 14 U/L (ref 7–52)
ALT SERPL W P-5'-P-CCNC: 14 U/L (ref 7–52)
ANION GAP SERPL CALCULATED.3IONS-SCNC: 5 MMOL/L (ref 4–13)
ANION GAP SERPL CALCULATED.3IONS-SCNC: 5 MMOL/L (ref 4–13)
AST SERPL W P-5'-P-CCNC: 31 U/L (ref 13–39)
AST SERPL W P-5'-P-CCNC: 31 U/L (ref 13–39)
BASOPHILS # BLD AUTO: 0.07 THOUSANDS/ÂΜL (ref 0–0.1)
BASOPHILS # BLD AUTO: 0.07 THOUSANDS/ÂΜL (ref 0–0.1)
BASOPHILS NFR BLD AUTO: 1 % (ref 0–1)
BASOPHILS NFR BLD AUTO: 1 % (ref 0–1)
BILIRUB SERPL-MCNC: 0.22 MG/DL (ref 0.2–1)
BILIRUB SERPL-MCNC: 0.22 MG/DL (ref 0.2–1)
BUN SERPL-MCNC: 44 MG/DL (ref 5–25)
BUN SERPL-MCNC: 44 MG/DL (ref 5–25)
CALCIUM ALBUM COR SERPL-MCNC: 9 MG/DL (ref 8.3–10.1)
CALCIUM ALBUM COR SERPL-MCNC: 9 MG/DL (ref 8.3–10.1)
CALCIUM SERPL-MCNC: 8.2 MG/DL (ref 8.4–10.2)
CALCIUM SERPL-MCNC: 8.2 MG/DL (ref 8.4–10.2)
CHLORIDE SERPL-SCNC: 100 MMOL/L (ref 96–108)
CHLORIDE SERPL-SCNC: 100 MMOL/L (ref 96–108)
CO2 SERPL-SCNC: 33 MMOL/L (ref 21–32)
CO2 SERPL-SCNC: 33 MMOL/L (ref 21–32)
CREAT SERPL-MCNC: 1.97 MG/DL (ref 0.6–1.3)
CREAT SERPL-MCNC: 1.97 MG/DL (ref 0.6–1.3)
EOSINOPHIL # BLD AUTO: 0.46 THOUSAND/ÂΜL (ref 0–0.61)
EOSINOPHIL # BLD AUTO: 0.46 THOUSAND/ÂΜL (ref 0–0.61)
EOSINOPHIL NFR BLD AUTO: 4 % (ref 0–6)
EOSINOPHIL NFR BLD AUTO: 4 % (ref 0–6)
ERYTHROCYTE [DISTWIDTH] IN BLOOD BY AUTOMATED COUNT: 13.9 % (ref 11.6–15.1)
ERYTHROCYTE [DISTWIDTH] IN BLOOD BY AUTOMATED COUNT: 13.9 % (ref 11.6–15.1)
GFR SERPL CREATININE-BSD FRML MDRD: 25 ML/MIN/1.73SQ M
GFR SERPL CREATININE-BSD FRML MDRD: 25 ML/MIN/1.73SQ M
GLUCOSE SERPL-MCNC: 161 MG/DL (ref 65–140)
GLUCOSE SERPL-MCNC: 161 MG/DL (ref 65–140)
GLUCOSE SERPL-MCNC: 191 MG/DL (ref 65–140)
GLUCOSE SERPL-MCNC: 191 MG/DL (ref 65–140)
GLUCOSE SERPL-MCNC: 216 MG/DL (ref 65–140)
GLUCOSE SERPL-MCNC: 216 MG/DL (ref 65–140)
GLUCOSE SERPL-MCNC: 228 MG/DL (ref 65–140)
GLUCOSE SERPL-MCNC: 228 MG/DL (ref 65–140)
GLUCOSE SERPL-MCNC: 89 MG/DL (ref 65–140)
GLUCOSE SERPL-MCNC: 89 MG/DL (ref 65–140)
GLUCOSE SERPL-MCNC: 99 MG/DL (ref 65–140)
GLUCOSE SERPL-MCNC: 99 MG/DL (ref 65–140)
HCT VFR BLD AUTO: 33.7 % (ref 34.8–46.1)
HCT VFR BLD AUTO: 33.7 % (ref 34.8–46.1)
HGB BLD-MCNC: 10.2 G/DL (ref 11.5–15.4)
HGB BLD-MCNC: 10.2 G/DL (ref 11.5–15.4)
IMM GRANULOCYTES # BLD AUTO: 0.12 THOUSAND/UL (ref 0–0.2)
IMM GRANULOCYTES # BLD AUTO: 0.12 THOUSAND/UL (ref 0–0.2)
IMM GRANULOCYTES NFR BLD AUTO: 1 % (ref 0–2)
IMM GRANULOCYTES NFR BLD AUTO: 1 % (ref 0–2)
LYMPHOCYTES # BLD AUTO: 1.46 THOUSANDS/ÂΜL (ref 0.6–4.47)
LYMPHOCYTES # BLD AUTO: 1.46 THOUSANDS/ÂΜL (ref 0.6–4.47)
LYMPHOCYTES NFR BLD AUTO: 13 % (ref 14–44)
LYMPHOCYTES NFR BLD AUTO: 13 % (ref 14–44)
MCH RBC QN AUTO: 29.5 PG (ref 26.8–34.3)
MCH RBC QN AUTO: 29.5 PG (ref 26.8–34.3)
MCHC RBC AUTO-ENTMCNC: 30.3 G/DL (ref 31.4–37.4)
MCHC RBC AUTO-ENTMCNC: 30.3 G/DL (ref 31.4–37.4)
MCV RBC AUTO: 97 FL (ref 82–98)
MCV RBC AUTO: 97 FL (ref 82–98)
MONOCYTES # BLD AUTO: 0.97 THOUSAND/ÂΜL (ref 0.17–1.22)
MONOCYTES # BLD AUTO: 0.97 THOUSAND/ÂΜL (ref 0.17–1.22)
MONOCYTES NFR BLD AUTO: 9 % (ref 4–12)
MONOCYTES NFR BLD AUTO: 9 % (ref 4–12)
NEUTROPHILS # BLD AUTO: 7.99 THOUSANDS/ÂΜL (ref 1.85–7.62)
NEUTROPHILS # BLD AUTO: 7.99 THOUSANDS/ÂΜL (ref 1.85–7.62)
NEUTS SEG NFR BLD AUTO: 72 % (ref 43–75)
NEUTS SEG NFR BLD AUTO: 72 % (ref 43–75)
NRBC BLD AUTO-RTO: 0 /100 WBCS
NRBC BLD AUTO-RTO: 0 /100 WBCS
PLATELET # BLD AUTO: 163 THOUSANDS/UL (ref 149–390)
PLATELET # BLD AUTO: 163 THOUSANDS/UL (ref 149–390)
PMV BLD AUTO: 10.8 FL (ref 8.9–12.7)
PMV BLD AUTO: 10.8 FL (ref 8.9–12.7)
POTASSIUM SERPL-SCNC: 4.5 MMOL/L (ref 3.5–5.3)
POTASSIUM SERPL-SCNC: 4.5 MMOL/L (ref 3.5–5.3)
PROCALCITONIN SERPL-MCNC: 0.83 NG/ML
PROCALCITONIN SERPL-MCNC: 0.83 NG/ML
PROT SERPL-MCNC: 6.3 G/DL (ref 6.4–8.4)
PROT SERPL-MCNC: 6.3 G/DL (ref 6.4–8.4)
RBC # BLD AUTO: 3.46 MILLION/UL (ref 3.81–5.12)
RBC # BLD AUTO: 3.46 MILLION/UL (ref 3.81–5.12)
SODIUM SERPL-SCNC: 138 MMOL/L (ref 135–147)
SODIUM SERPL-SCNC: 138 MMOL/L (ref 135–147)
WBC # BLD AUTO: 11.07 THOUSAND/UL (ref 4.31–10.16)
WBC # BLD AUTO: 11.07 THOUSAND/UL (ref 4.31–10.16)

## 2023-01-24 RX ORDER — METOPROLOL TARTRATE 5 MG/5ML
5 INJECTION INTRAVENOUS ONCE
Status: DISCONTINUED | OUTPATIENT
Start: 2023-01-24 | End: 2023-01-24

## 2023-01-24 RX ORDER — AMOXICILLIN 250 MG
1 CAPSULE ORAL 2 TIMES DAILY
Status: DISCONTINUED | OUTPATIENT
Start: 2023-01-24 | End: 2023-01-27 | Stop reason: HOSPADM

## 2023-01-24 RX ORDER — POLYETHYLENE GLYCOL 3350 17 G/17G
17 POWDER, FOR SOLUTION ORAL DAILY
Status: DISCONTINUED | OUTPATIENT
Start: 2023-01-24 | End: 2023-01-27 | Stop reason: HOSPADM

## 2023-01-24 RX ORDER — METOPROLOL SUCCINATE 50 MG/1
50 TABLET, EXTENDED RELEASE ORAL DAILY
Status: DISCONTINUED | OUTPATIENT
Start: 2023-01-24 | End: 2023-01-27 | Stop reason: HOSPADM

## 2023-01-24 RX ADMIN — INSULIN LISPRO 2 UNITS: 100 INJECTION, SOLUTION INTRAVENOUS; SUBCUTANEOUS at 12:28

## 2023-01-24 RX ADMIN — SENNOSIDES AND DOCUSATE SODIUM 1 TABLET: 8.6; 5 TABLET ORAL at 09:48

## 2023-01-24 RX ADMIN — INSULIN LISPRO 2 UNITS: 100 INJECTION, SOLUTION INTRAVENOUS; SUBCUTANEOUS at 17:09

## 2023-01-24 RX ADMIN — ACETAMINOPHEN 650 MG: 325 TABLET, FILM COATED ORAL at 07:14

## 2023-01-24 RX ADMIN — PRAVASTATIN SODIUM 80 MG: 80 TABLET ORAL at 17:09

## 2023-01-24 RX ADMIN — FLUTICASONE PROPIONATE 1 SPRAY: 50 SPRAY, METERED NASAL at 08:50

## 2023-01-24 RX ADMIN — PREGABALIN 100 MG: 100 CAPSULE ORAL at 17:09

## 2023-01-24 RX ADMIN — GUAIFENESIN 1200 MG: 600 TABLET ORAL at 21:15

## 2023-01-24 RX ADMIN — ACETAMINOPHEN 650 MG: 325 TABLET, FILM COATED ORAL at 21:21

## 2023-01-24 RX ADMIN — PREGABALIN 100 MG: 100 CAPSULE ORAL at 08:49

## 2023-01-24 RX ADMIN — METOPROLOL SUCCINATE 50 MG: 50 TABLET, EXTENDED RELEASE ORAL at 08:49

## 2023-01-24 RX ADMIN — INSULIN LISPRO 4 UNITS: 100 INJECTION, SOLUTION INTRAVENOUS; SUBCUTANEOUS at 21:16

## 2023-01-24 RX ADMIN — CEFTRIAXONE SODIUM 2000 MG: 10 INJECTION, POWDER, FOR SOLUTION INTRAVENOUS at 12:26

## 2023-01-24 RX ADMIN — GUAIFENESIN 1200 MG: 600 TABLET ORAL at 08:49

## 2023-01-24 RX ADMIN — POLYETHYLENE GLYCOL 3350 17 G: 17 POWDER, FOR SOLUTION ORAL at 09:48

## 2023-01-24 RX ADMIN — APIXABAN 5 MG: 5 TABLET, FILM COATED ORAL at 08:49

## 2023-01-24 RX ADMIN — MONTELUKAST 10 MG: 10 TABLET, FILM COATED ORAL at 21:15

## 2023-01-24 RX ADMIN — APIXABAN 5 MG: 5 TABLET, FILM COATED ORAL at 17:09

## 2023-01-24 RX ADMIN — INSULIN GLARGINE 35 UNITS: 100 INJECTION, SOLUTION SUBCUTANEOUS at 21:15

## 2023-01-24 RX ADMIN — ONDANSETRON 4 MG: 4 TABLET, ORALLY DISINTEGRATING ORAL at 23:01

## 2023-01-24 NOTE — CASE MANAGEMENT
Case Management Assessment & Discharge Planning Note    Patient name Becca Spencer  Location S /S -01 MRN [id number]  : 1950 Date 2023       Current Admission Date: 2023  Current Admission Diagnosis:Pneumonia   Patient Active Problem List    Diagnosis Date Noted   • NATALIA (acute kidney injury) ([facility] [address] ) 2023   • Morbid obesity with BMI of 50 0-59 9, adult ([facility] [address] ) 2023   • Fall 2023   • Sepsis ([address] ) 2023   • Pneumonia 2023   • Syncope 2023   • COPD (chronic obstructive pulmonary disease) ([address] ) 2023   • Diabetes ([facility] [address] ) 2023   • CHF (congestive heart failure) ([facility] [address] ) 2023   • Atrial fibrillation ([facility] [address] ) 2023   • CKD (chronic kidney disease) 2023   • Hypertension 2023   • Obstructive sleep apnea 2023   • Open wound 2023      LOS (days): 2  Geometric Mean LOS (GMLOS) (days): 5 00  Days to GMLOS:2 8     OBJECTIVE:    Risk of Unplanned Readmission Score: 18 11         Current admission status: Inpatient       Preferred Pharmacy:   UNKNOWN - FOLLOW UP PRIOR TO DISCHARGE TO E-PRESCRIBE  No address on file      Primary Care Provider: Jailene Swartz MD    Primary Insurance: MEDICARE  Secondary Insurance: [employer]    ASSESSMENT:  Active Health Care Proxies    There are no active Health Care Proxies on file  Patient Information  Admitted from[de-identified] Home  Mental Status: Alert  During Assessment patient was accompanied by: Not accompanied during assessment  Assessment information provided by[de-identified] Patient  Primary Caregiver: Self  Support Systems: Family members  [place] of Residence: [address] [place] [address] do you live in?: [address]  entry access options   Select all that apply : Stairs  Number of steps to enter home : 3  Type of Current Residence: [facility]  In the last 12 months, was there a time when you were not able to pay the mortgage or rent on time?: No  In the last 12 months, was there a time when you did not have a steady place to sleep or slept in a shelter (including now)?: No  Homeless/housing insecurity resource given?: No  Living Arrangements: Lives w/ Extended Family    Activities of Daily Living Prior to Admission  Functional Status: Independent  Completes ADLs independently?: Yes  Ambulates independently?: Yes  Does patient use assisted devices?: Yes  Assisted Devices (DME) used: Home Oxygen concentrator, Portable Oxygen tanks, Joe Krishnamurthymason [address]  DME Company Name (respiratory supplies):  [employer]  O2 Rate(s): 2L  Does patient currently own DME?: Yes  What DME does the patient currently own?: Rockne Lazier, Wheelchair, Shower Chair, Straight Cane, Portable Oxygen concentrator, Home Oxygen concentrator  Does patient have a history of Outpatient Therapy (PT/OT)?: No  Does the patient have a history of Short-Term Rehab?: No  Does patient have a history of HHC?: Yes  Does patient currently have [facility]?: No         Patient Information Continued  Within the past 12 months, you worried that your food would run out before you got the money to buy more : Never true  Within the past 12 months, the food you bought just didn't last and you didn't have money to get more : Never true         Means of Transportation  Means of Transport to Appts[de-identified] Family transport (niece, Meghanine Side)  In the past 12 months, has lack of transportation kept you from medical appointments or from getting medications?: No  In the past 12 months, has lack of transportation kept you from meetings, work, or from getting things needed for daily living?: No        DISCHARGE DETAILS:    Discharge planning discussed with[de-identified] patient  Freedom of Choice: Yes  Comments - Freedom of Choice: patient relayed would refuse STR if it is rcmd, but wouldd be open to [facility] as she has had it in the past - no preference for agency                                                                             IMM Given (Date):: 01/24/23  IMM Given to[de-identified] Patient

## 2023-01-24 NOTE — ASSESSMENT & PLAN NOTE
Maintained on Toprol-XL 50 mg and torsemide 40 mg daily  Hold above meds for now- 1/23/2023    PLAN  Restart metoprolol 12 5 BID

## 2023-01-24 NOTE — PLAN OF CARE
Problem: MOBILITY - ADULT  Goal: Maintain or return to baseline ADL function  Description: INTERVENTIONS:  -  Assess patient's ability to carry out ADLs; assess patient's baseline for ADL function and identify physical deficits which impact ability to perform ADLs (bathing, care of mouth/teeth, toileting, grooming, dressing, etc )  - Assess/evaluate cause of self-care deficits   - Assess range of motion  - Assess patient's mobility; develop plan if impaired  - Assess patient's need for assistive devices and provide as appropriate  - Encourage maximum independence but intervene and supervise when necessary  - Involve family in performance of ADLs  - Assess for home care needs following discharge   - Consider OT consult to assist with ADL evaluation and planning for discharge  - Provide patient education as appropriate  Outcome: Progressing  Goal: Maintains/Returns to pre admission functional level  Description: INTERVENTIONS:  - Perform BMAT or MOVE assessment daily    - Set and communicate daily mobility goal to care team and patient/family/caregiver     - Collaborate with rehabilitation services on mobility goals if consulted  - Out of bed for toileting  - Record patient progress and toleration of activity level   Outcome: Progressing     Problem: Prexisting or High Potential for Compromised Skin Integrity  Goal: Skin integrity is maintained or improved  Description: INTERVENTIONS:  - Identify patients at risk for skin breakdown  - Assess and monitor skin integrity  - Assess and monitor nutrition and hydration status  - Monitor labs   - Assess for incontinence   - Turn and reposition patient  - Assist with mobility/ambulation  - Relieve pressure over bony prominences  - Avoid friction and shearing  - Provide appropriate hygiene as needed including keeping skin clean and dry  - Evaluate need for skin moisturizer/barrier cream  - Collaborate with interdisciplinary team   - Patient/family teaching  - Consider wound care consult   Outcome: Progressing     Problem: Nutrition/Hydration-ADULT  Goal: Nutrient/Hydration intake appropriate for improving, restoring or maintaining nutritional needs  Description: Monitor and assess patient's nutrition/hydration status for malnutrition  Collaborate with interdisciplinary team and initiate plan and interventions as ordered  Monitor patient's weight and dietary intake as ordered or per policy  Utilize nutrition screening tool and intervene as necessary  Determine patient's food preferences and provide high-protein, high-caloric foods as appropriate       INTERVENTIONS:  - Monitor oral intake, urinary output, labs, and treatment plans  - Assess nutrition and hydration status and recommend course of action  - Evaluate amount of meals eaten  - Assist patient with eating if necessary   - Allow adequate time for meals  - Recommend/ encourage appropriate diets, oral nutritional supplements, and vitamin/mineral supplements  - Order, calculate, and assess calorie counts as needed  - Recommend, monitor, and adjust tube feedings based on assessed needs  - Assess need for intravenous fluids  - Provide nutrition/hydration education as appropriate  - Include patient/family/caregiver in decisions related to nutrition  Outcome: Progressing     Problem: PAIN - ADULT  Goal: Verbalizes/displays adequate comfort level or baseline comfort level  Description: Interventions:  - Encourage patient to monitor pain and request assistance  - Assess pain using appropriate pain scale  - Administer analgesics based on type and severity of pain and evaluate response  - Implement non-pharmacological measures as appropriate and evaluate response  - Consider cultural and social influences on pain and pain management  - Notify physician/advanced practitioner if interventions unsuccessful or patient reports new pain  Outcome: Progressing     Problem: SAFETY ADULT  Goal: Maintain or return to baseline ADL function  Description: INTERVENTIONS:  -  Assess patient's ability to carry out ADLs; assess patient's baseline for ADL function and identify physical deficits which impact ability to perform ADLs (bathing, care of mouth/teeth, toileting, grooming, dressing, etc )  - Assess/evaluate cause of self-care deficits   - Assess range of motion  - Assess patient's mobility; develop plan if impaired  - Assess patient's need for assistive devices and provide as appropriate  - Encourage maximum independence but intervene and supervise when necessary  - Involve family in performance of ADLs  - Assess for home care needs following discharge   - Consider OT consult to assist with ADL evaluation and planning for discharge  - Provide patient education as appropriate  Outcome: Progressing  Goal: Maintains/Returns to pre admission functional level  Description: INTERVENTIONS:  - Perform BMAT or MOVE assessment daily    - Set and communicate daily mobility goal to care team and patient/family/caregiver     - Collaborate with rehabilitation services on mobility goals if consulted  - Out of bed for toileting  - Record patient progress and toleration of activity level   Outcome: Progressing  Goal: Patient will remain free of falls  Description: INTERVENTIONS:  - Educate patient/family on patient safety including physical limitations  - Instruct patient to call for assistance with activity   - Consult OT/PT to assist with strengthening/mobility   - Keep Call bell within reach  - Keep bed low and locked with side rails adjusted as appropriate  - Keep care items and personal belongings within reach  - Initiate and maintain comfort rounds  - Make Fall Risk Sign visible to staff  - Apply yellow socks and bracelet for high fall risk patients  - Consider moving patient to room near nurses station  Outcome: Progressing     Problem: INFECTION - ADULT  Goal: Absence or prevention of progression during hospitalization  Description: INTERVENTIONS:  - Assess and monitor for signs and symptoms of infection  - Monitor lab/diagnostic results  - Monitor all insertion sites, i e  indwelling lines, tubes, and drains  - Monitor endotracheal if appropriate and nasal secretions for changes in amount and color  - Orange appropriate cooling/warming therapies per order  - Administer medications as ordered  - Instruct and encourage patient and family to use good hand hygiene technique  - Identify and instruct in appropriate isolation precautions for identified infection/condition  Outcome: Progressing     Problem: DISCHARGE PLANNING  Goal: Discharge to home or other facility with appropriate resources  Description: INTERVENTIONS:  - Identify barriers to discharge w/patient and caregiver  - Arrange for needed discharge resources and transportation as appropriate  - Identify discharge learning needs (meds, wound care, etc )  - Arrange for interpretive services to assist at discharge as needed  - Refer to Case Management Department for coordinating discharge planning if the patient needs post-hospital services based on physician/advanced practitioner order or complex needs related to functional status, cognitive ability, or social support system  Outcome: Progressing     Problem: Knowledge Deficit  Goal: Patient/family/caregiver demonstrates understanding of disease process, treatment plan, medications, and discharge instructions  Description: Complete learning assessment and assess knowledge base    Interventions:  - Provide teaching at level of understanding  - Provide teaching via preferred learning methods  Outcome: Progressing

## 2023-01-24 NOTE — ASSESSMENT & PLAN NOTE
Maintained on Toprol-XL 50 mg daily and Eliquis 5 mg twice daily  Patient is presenting for syncopal episode  EKG on this admission shows sinus tachycardia with heart rate of 113, patient noted to be septic on this admission and she admits to not taking her metoprolol this morning      Plan:  Continue telemetry to monitor rates closely  Hold Toprol incidental hypotension on 1/23/2023 and NATALIA   Restarted home metoprolol 12 5 BID   Monitor BMP  Mag >2, k>4

## 2023-01-24 NOTE — ASSESSMENT & PLAN NOTE
Lab Results   Component Value Date    HGBA1C 11 0 (H) 01/23/2023       Recent Labs     01/23/23  0723 01/23/23  1114 01/23/23  1642 01/23/23 2044   POCGLU 109 151* 162* 180*       Blood Sugar Average: Last 72 hrs:  (P) 263 9  Blood sugar appears to be poorly controlled on this admission  Patient is maintained on Toujeo 25 units before breakfast and 25 units before bedtime along with mealtime Humalog    Patient admits to only checking her blood sugars and taking mealtime insulin at breakfast     Plan: HBA1C- 11%  Toujeo 35 units daily  Sliding scale insulin ordered for mealtime insulin  Hypoglycemia protocol  Diabetic/cardiac diet ordered  Reinforced importance of blood glucose monitoring

## 2023-01-24 NOTE — ASSESSMENT & PLAN NOTE
Patient has history of open wound on right heel for which she follows up with wound care as outpatient  Family seems to be very supportive and helping keep the wound clean  Past medical history is significant for type 2 diabetes  Neurovascular exam of both feet appears to be intact  Wound on right heel is exposed but does not appear to be purulent or infected        Podiatry recs: Ulcer appears clinically stable and noninfected  Right heel dressed with Maxorb Ag and Allevyn foam pad  Can change dressing every other day with wound care  No podiatric interventions warranted during this admission    PLAN  Podiatry consulted, reecommendations appreciated as below  Consult wound care for dressing change every other day

## 2023-01-24 NOTE — PROGRESS NOTES
[facility]  Progress Note Marie Crespo 1950, 81 y o  female MRN: [id number]  Unit/Bed#: S -01 Encounter: [id number]  Primary Care Provider: Duke Harden MD   Date and time admitted to hospital: 1/22/2023  9:17 AM    * Pneumonia  Assessment & Plan  Patient presents after syncopal episode while being on the toilet  Past medical history significant for COPD, CHF, obstructive sleep apnea with poor CPAP compliance  Granddaughter found patient unconscious on the floor  O2 sat at that time was reportedly 88%  Patient reports not feeling well for the past several days complaining of productive cough with green sputum  Patient denies fever, chills or sweats  No chest pain or complaints of being unusually short of breath reported  She is found to be tachycardic on arrival to the emergency department, patient does admit to not taking her metoprolol this morning  Work-up in the emergency department significant for leukocytosis and CT concerning for multifocal right sided pneumonia  Given 500 cc bolus and one-time dose of Rocephin in the emergency department  Physical exam mostly noncontributory due to large body habitus  Sputum cx - gram positive rods 3+, gram positive cocci in pairs 3+, gram negative rods 4+     Procal downtrending   Lab Results   Component Value Date    PROCALCITONI 0 83 (H) 01/24/2023    PROCALCITONI 1 28 (H) 01/23/2023    PROCALCITONI 0 13 01/22/2023     WBC downtrending   Lab Results   Component Value Date    WBC 11 07 (H) 01/24/2023    WBC 15 36 (H) 01/23/2023    WBC 15 76 (H) 01/22/2023         Plan:  Continue IV Ceftriaxone 2g daily (first dose 1/22) - day 3   Mucinex 1200 q12 hours  F/u blood cultures   Trend CBC  Incentive spirometry and flutter valve  Continue supplementary oxygen to maintain O2 sat between 88-92%      Sepsis [facility])  Assessment & Plan  Secondary to community-acquired pneumonia    Patient presents with leukocytosis, tachycardia and suspected source of right sided multifocal pneumonia  Please refer to A&P under community-acquired pneumonia    Lab Results   Component Value Date    WBC 11 07 (H) 01/24/2023    WBC 15 36 (H) 01/23/2023    WBC 15 76 (H) 01/22/2023    PROCALCITONI 0 83 (H) 01/24/2023    PROCALCITONI 1 28 (H) 01/23/2023    PROCALCITONI 0 13 01/22/2023     Lab Results   Component Value Date    SPUTUMCULTUR Culture too young- will reincubate 01/23/2023    BLOODCX No Growth at 24 hrs  01/22/2023    BLOODCX No Growth at 24 hrs  01/22/2023       PLAN  IV Ceftriaxone 2g in 24 hours (1st dose 1/22) - day 3    Fluids normal saline 100 cc/h  Follow-up blood cultures, urine strep and legionella   Monitor temperature curve, CBC      Morbid obesity with BMI of 50 0-59 9, adult [facility])  Assessment & Plan    Follow up with PCP for weight loss management at [place]    NATALIA (acute kidney injury) [facility])  Assessment & Plan  Recent Labs     01/22/23  0932 01/23/23  0359 01/24/23  0725   CREATININE 1 55* 2 57* 1 97*   EGFR 33 18 25     Estimated Creatinine Clearance: 32 3 mL/min (A) (by C-G formula based on SCr of 1 97 mg/dL (H))  Baseline 1 5  NATALIA secondary to sepsis and hypotension    Plan-  - isolyte  100cc/h  -Monitor I's/O  And  Daily BMP I  -consider nephrology consultation if worsening kidney fxn    Open wound  Assessment & Plan  Patient has history of open wound on right heel for which she follows up with wound care as outpatient  Family seems to be very supportive and helping keep the wound clean  Past medical history is significant for type 2 diabetes  Neurovascular exam of both feet appears to be intact  Wound on right heel is exposed but does not appear to be purulent or infected        Podiatry recs: Ulcer appears clinically stable and noninfected  Right heel dressed with Maxorb Ag and Allevyn foam pad  Can change dressing every other day with wound care  No podiatric interventions warranted during this admission    PLAN  Podiatry consulted, reecommendations appreciated as below  Consult wound care for dressing change every other day     Obstructive sleep apnea  Assessment & Plan  Patient has past medical history obstructive sleep apnea for which she is supposed to be using a CPAP at night  Patient reports poor compliance with CPAP due to discomfort with the mask  CPAP ordered for this admission  PLAN  Needs referral to sleep medicine on discharge to discuss difficulties with compliance  Continue home 2L NC overnight during admission   Add humidifier to NC to avoid nasal dryness and epistaxis         Hypertension  Assessment & Plan  Maintained on Toprol-XL 50 mg and torsemide 40 mg daily  Hold above meds for now- 1/23/2023    PLAN  Restart metoprolol 12 5 BID     CKD (chronic kidney disease)  Assessment & Plan  Lab Results   Component Value Date    EGFR 25 01/24/2023    EGFR 18 01/23/2023    EGFR 33 01/22/2023    CREATININE 1 97 (H) 01/24/2023    CREATININE 2 57 (H) 01/23/2023    CREATININE 1 55 (H) 01/22/2023   Baseline creatinine seems to range between 1 5-1 8  Trend BMP for worsening creatinine  Avoid hypotension and nephrotoxic agents  Monitor for changes in urinary output  Track I/O's     Atrial fibrillation (HCC)  Assessment & Plan  Maintained on Toprol-XL 50 mg daily and Eliquis 5 mg twice daily  Patient is presenting for syncopal episode  EKG on this admission shows sinus tachycardia with heart rate of 113, patient noted to be septic on this admission and she admits to not taking her metoprolol this morning  Plan:  Continue telemetry to monitor rates closely  Hold Toprol incidental hypotension on 1/23/2023 and NATALIA   Restarted home metoprolol XL 50 daily  Monitor BMP  Mag >2, k>4        CHF (congestive heart failure) (HCC)  Assessment & Plan  Wt Readings from Last 3 Encounters:   01/23/23 117 kg (257 lb 8 oz)     Maintained on torsemide 40 mg daily    Stress test in August 2022 shows an ejection fraction of 70%  Patient appears to be euvolemic on this admission  Continue home medications  Monitor BMP  Magnesium level ordered  Cardiac diet ordered  Plan  Hold torsemide in the setting of NATALIA  Assess volume status daily    Diabetes [facility])  Assessment & Plan  Lab Results   Component Value Date    HGBA1C 11 0 (H) 01/23/2023       Recent Labs     01/23/23  0723 01/23/23  1114 01/23/23  1642 01/23/23  2044   POCGLU 109 151* 162* 180*       Blood Sugar Average: Last 72 hrs:  (P) 263 9  Blood sugar appears to be poorly controlled on this admission  Patient is maintained on Toujeo 25 units before breakfast and 25 units before bedtime along with mealtime Humalog  Patient admits to only checking her blood sugars and taking mealtime insulin at breakfast     Plan: HBA1C- 11%  Toujeo 35 units daily  Sliding scale insulin ordered for mealtime insulin  Hypoglycemia protocol  Diabetic/cardiac diet ordered  Reinforced importance of blood glucose monitoring    COPD (chronic obstructive pulmonary disease) ([facility])  Assessment & Plan  Maintained on albuterol every 6 hours as needed, daily fluticasone, Singulair nightly  Patient uses 2 L nasal cannula oxygen at nighttime  Plan:  Continue home maintenance medications- singulair  Prn xopenex on 1/23/2023  Continue supplementary oxygen to maintain O2 sats between 88 and 92% and wean as tolerated     Syncope  Assessment & Plan  Patient presents after syncopal episode while sitting on the toilet  Past medical history significant for atrial fibrillation maintained on metoprolol and Eliquis, CHF on torsemide 40 mg daily, and type 2 diabetes  Patient says she went to the bathroom earlier this morning and was found on the bathroom floor by her granddaughter  Patient does not recall prodromal symptoms prior to passing out but says she may have been trying to stand up  She denies straining just before losing consciousness    Granddaughter reports O2 saturation at the time of finding the patient was approximately 88%  Patient found to be tachycardic and hypertensive on POA but she did not take her metoprolol this morning  EKG shows sinus tachycardia  Troponins are negative  Stress test from August 2022 shows an EF 70%  As of now there is an unclear etiology of why the patient lost consciousness, however tachyarrhythmia and orthostatic hypotension are certainly possibilities  She will need further work-up and observation  Ddx: orthostatic hypotension vs  low BP in s/o PNA infx vs  tachyarrhythmia     Plan:  Orthostatics ordered for tomorrow 1/25  Continue Telemetry  Trend BMP  Fall precautions ordered      Tompa U  66  following syncopal episode at home, home pulse ox 88%  CT head and trauma series negative    PLAN  PT OT  Fall precautions  Orthostatic vitals 1/25        VTE Pharmacologic Prophylaxis: VTE Score: 9 High Risk (Score >/= 5) - Pharmacological DVT Prophylaxis Ordered: apixaban (Eliquis)  Sequential Compression Devices Ordered  Patient Centered Rounds: I performed bedside rounds with nursing staff today  Discussions with Specialists or Other Care Team Provider:     Education and Discussions with Family / Patient: Updated  (sister) via phone  Time Spent for Care: 30 minutes  More than 50% of total time spent on counseling and coordination of care as described above  Current Length of Stay: 2 day(s)  Current Patient Status: Inpatient   Certification Statement: The patient will continue to require additional inpatient hospital stay due to pneumonia   Discharge Plan: Anticipate discharge in 24-48 hrs to discharge location to be determined pending rehab evaluations      Code Status: Level 1 - Full Code    Subjective:   81 y o  F with a PMH of HTN, T2DM (on lantus 35U at night), COPD, OSMANY (2L NC overnight, doesn't tolerate CPAP), CHF, atrial fibrillation (on Eliquis 5mg and metoprolol 50mg), and CKD (BL Cr 1 5-1 8) who presents after losing consciousness on the toilet the morning of admission and several days of productive cough  Around 3AM, pt was tachycardic () but decreased back down without intervention  Around 4AM, pt was hypotensive (BP 72-92/42) with lethargy, slurred speech and dry appearing  She received 500cc bolus with an increase im BP to 102/42  This morning, patient reports not feeling so well  She experienced a headache (right, frontal, 10/10 in severity) overnight, which improved with tylenol to 2/10  She is still having some lightheadedness with sitting (hasn't tried changing positions or getting out of bed yet)  Her cough is largely unchanged from admission except for sputum color changing from green to dark brown  She feels constipated and has not had a bowel movement in 2 days (uses a stool softener at home)  She also had a nose bleed overnight from the NC  She denies any SOB, CP, changes in vision, diaphoresis or GI upset  Objective:     Vitals:   Temp (24hrs), Av 7 °F (37 1 °C), Min:98 4 °F (36 9 °C), Max:99 2 °F (37 3 °C)    Temp:  [98 4 °F (36 9 °C)-99 2 °F (37 3 °C)] 99 2 °F (37 3 °C)  HR:  [] 90  Resp:  [16-17] 16  BP: (125-171)/(52-80) 171/80  SpO2:  [91 %-93 %] 91 %  Body mass index is 47 18 kg/m²  Input and Output Summary (last 24 hours):   No intake or output data in the 24 hours ending 23 1514    Physical Exam:   Physical Exam  Constitutional:       General: She is not in acute distress  Appearance: She is obese  She is not ill-appearing, toxic-appearing or diaphoretic  HENT:      Head:      Comments: Diffusely tender to palpation on bilateral frontal regions of scalp (mild)   Cardiovascular:      Rate and Rhythm: Normal rate and regular rhythm  Pulses: Normal pulses  Heart sounds: Normal heart sounds  No murmur heard  No friction rub  No gallop  Pulmonary:      Effort: Pulmonary effort is normal  No respiratory distress  Breath sounds: Wheezing present  No rales  Comments: Wheezing in R lung fields   Abdominal:      General: Abdomen is flat  Bowel sounds are normal       Palpations: Abdomen is soft  Musculoskeletal:      Comments: Trace b/l LE pitting edema    Skin:     Comments: Bruising and swelling in orbital region on R side    Neurological:      General: No focal deficit present  Mental Status: She is alert and oriented to person, place, and time  Additional Data:     Labs:  Results from last 7 days   Lab Units 01/24/23  0725   WBC Thousand/uL 11 07*   HEMOGLOBIN g/dL 10 2*   HEMATOCRIT % 33 7*   PLATELETS Thousands/uL 163   NEUTROS PCT % 72   LYMPHS PCT % 13*   MONOS PCT % 9   EOS PCT % 4     Results from last 7 days   Lab Units 01/24/23  0725   SODIUM mmol/L 138   POTASSIUM mmol/L 4 5   CHLORIDE mmol/L 100   CO2 mmol/L 33*   BUN mg/dL 44*   CREATININE mg/dL 1 97*   ANION GAP mmol/L 5   CALCIUM mg/dL 8 2*   ALBUMIN g/dL 3 0*   TOTAL BILIRUBIN mg/dL 0 22   ALK PHOS U/L 52   ALT U/L 14   AST U/L 31   GLUCOSE RANDOM mg/dL 89         Results from last 7 days   Lab Units 01/24/23  1021 01/24/23  0710 01/23/23  2044 01/23/23  1642 01/23/23  1114 01/23/23  0723 01/23/23  0336 01/23/23  0003 01/22/23  2208 01/22/23  2034 01/22/23  2031 01/22/23  1657   POC GLUCOSE mg/dl 191* 99 180* 162* 151* 109 154* 284* 383* 467* 423* 326*     Results from last 7 days   Lab Units 01/23/23  1722   HEMOGLOBIN A1C % 11 0*     Results from last 7 days   Lab Units 01/24/23  0725 01/23/23  0404 01/22/23  1042   LACTIC ACID mmol/L  --   --  1 1   PROCALCITONIN ng/ml 0 83* 1 28* 0 13       Lines/Drains:  Invasive Devices     Peripheral Intravenous Line  Duration           Peripheral IV 01/22/23 Right;Upper Arm 2 days                      Imaging: No pertinent imaging reviewed  Recent Cultures (last 7 days):   Results from last 7 days   Lab Units 01/23/23  1431 01/22/23  1042   BLOOD CULTURE   --  No Growth at 24 hrs  No Growth at 24 hrs     SPUTUM CULTURE Culture too young- will reincubate  --    GRAM STAIN RESULT  1+ Epithelial cells per low power field*  3+ Polys*  4+ Gram negative rods*  3+ Gram positive cocci in pairs*  3+ Gram positive rods*  --        Last 24 Hours Medication List:   Current Facility-Administered Medications   Medication Dose Route Frequency Provider Last Rate   • acetaminophen  650 mg Oral Q6H PRN Zach Martin MD     • apixaban  5 mg Oral BID Zach Martin MD     • cefTRIAXone  2,000 mg Intravenous Q24H Heyward Hashimoto, PA-C 2,000 mg (01/24/23 1226)   • fluticasone  1 spray Nasal Daily Zach Martin MD     • guaiFENesin  1,200 mg Oral Q12H [facility] Pat Castor,      • insulin glargine  35 Units Subcutaneous HS Marilyn Stiles DO     • insulin lispro  2-12 Units Subcutaneous 4x Daily (AC & HS) Scotty Maria DO     • levalbuterol  1 25 mg Nebulization Q8H PRN Scotty Maria DO     • metoprolol succinate  50 mg Oral Daily Jayna Hardin MD     • montelukast  10 mg Oral HS Zach Martin MD     • ondansetron  4 mg Oral Q6H PRN Heyward Hashimoto, PA-C     • polyethylene glycol  17 g Oral Daily PRN Zach Martin MD     • polyethylene glycol  17 g Oral Daily Jayna Hardin MD     • pravastatin  80 mg Oral Daily With Aj Contreras MD     • pregabalin  100 mg Oral BID Zach Martin MD     • senna-docusate sodium  1 tablet Oral BID Jayna Hardin MD          Today, Patient Was Seen By: Jayna Hardin MD    **Please Note: This note may have been constructed using a voice recognition system  **

## 2023-01-24 NOTE — ASSESSMENT & PLAN NOTE
Recent Labs     01/22/23  0932 01/23/23  0359 01/24/23  0725   CREATININE 1 55* 2 57* 1 97*   EGFR 33 18 25     Estimated Creatinine Clearance: 32 3 mL/min (A) (by C-G formula based on SCr of 1 97 mg/dL (H))  Baseline 1 5  NATALIA secondary to sepsis and hypotension    Plan-  - isolyte  100cc/h  -Monitor I's/O   And  Daily BMP I  -consider nephrology consultation if worsening kidney fxn

## 2023-01-24 NOTE — ASSESSMENT & PLAN NOTE
Patient presents after syncopal episode while being on the toilet  Past medical history significant for COPD, CHF, obstructive sleep apnea with poor CPAP compliance  Granddaughter found patient unconscious on the floor  O2 sat at that time was reportedly 88%  Patient reports not feeling well for the past several days complaining of productive cough with green sputum  Patient denies fever, chills or sweats  No chest pain or complaints of being unusually short of breath reported  She is found to be tachycardic on arrival to the emergency department, patient does admit to not taking her metoprolol this morning  Work-up in the emergency department significant for leukocytosis and CT concerning for multifocal right sided pneumonia  Given 500 cc bolus and one-time dose of Rocephin in the emergency department  Physical exam mostly noncontributory due to large body habitus        Sputum cx - gram positive rods 3+, gram positive cocci in pairs 3+, gram negative rods 4+     Procal downtrending   Lab Results   Component Value Date    PROCALCITONI 0 83 (H) 01/24/2023    PROCALCITONI 1 28 (H) 01/23/2023    PROCALCITONI 0 13 01/22/2023     WBC downtrending   Lab Results   Component Value Date    WBC 11 07 (H) 01/24/2023    WBC 15 36 (H) 01/23/2023    WBC 15 76 (H) 01/22/2023         Plan:  Continue IV Ceftriaxone 2g daily (first dose 1/22) - day 3   Mucinex 1200 q12 hours  F/u blood cultures   Trend CBC  Incentive spirometry and flutter valve  Continue supplementary oxygen to maintain O2 sat between 88-92%

## 2023-01-24 NOTE — ASSESSMENT & PLAN NOTE
Patient has past medical history obstructive sleep apnea for which she is supposed to be using a CPAP at night  Patient reports poor compliance with CPAP due to discomfort with the mask  CPAP ordered for this admission  PLAN  Needs referral to sleep medicine on discharge to discuss difficulties with compliance    Continue home 2L NC overnight during admission   Add humidifier to NC to avoid nasal dryness and epistaxis

## 2023-01-24 NOTE — ASSESSMENT & PLAN NOTE
Secondary to community-acquired pneumonia    Patient presents with leukocytosis, tachycardia and suspected source of right sided multifocal pneumonia  Please refer to A&P under community-acquired pneumonia    Lab Results   Component Value Date    WBC 11 07 (H) 01/24/2023    WBC 15 36 (H) 01/23/2023    WBC 15 76 (H) 01/22/2023    PROCALCITONI 0 83 (H) 01/24/2023    PROCALCITONI 1 28 (H) 01/23/2023    PROCALCITONI 0 13 01/22/2023     Lab Results   Component Value Date    SPUTUMCULTUR Culture too young- will reincubate 01/23/2023    BLOODCX No Growth at 24 hrs  01/22/2023    BLOODCX No Growth at 24 hrs  01/22/2023       PLAN  IV Ceftriaxone 2g in 24 hours (1st dose 1/22) - day 3    Fluids normal saline 100 cc/h  Follow-up blood cultures, urine strep and legionella   Monitor temperature curve, CBC

## 2023-01-24 NOTE — ASSESSMENT & PLAN NOTE
Patient presents after syncopal episode while sitting on the toilet  Past medical history significant for atrial fibrillation maintained on metoprolol and Eliquis, CHF on torsemide 40 mg daily, and type 2 diabetes  Patient says she went to the bathroom earlier this morning and was found on the bathroom floor by her granddaughter  Patient does not recall prodromal symptoms prior to passing out but says she may have been trying to stand up  She denies straining just before losing consciousness  Granddaughter reports O2 saturation at the time of finding the patient was approximately 88%  Patient found to be tachycardic and hypertensive on POA but she did not take her metoprolol this morning  EKG shows sinus tachycardia  Troponins are negative  Stress test from August 2022 shows an EF 70%  As of now there is an unclear etiology of why the patient lost consciousness, however tachyarrhythmia and orthostatic hypotension are certainly possibilities  She will need further work-up and observation      Ddx: orthostatic hypotension vs  low BP in s/o PNA infx vs  tachyarrhythmia     Plan:  Orthostatics ordered for tomorrow 1/25  Continue Telemetry  Trend BMP  Fall precautions ordered

## 2023-01-24 NOTE — ASSESSMENT & PLAN NOTE
Fall following syncopal episode at home, home pulse ox 88%  CT head and trauma series negative    PLAN  PT OT  Fall precautions  Orthostatic vitals 1/25

## 2023-01-24 NOTE — ASSESSMENT & PLAN NOTE
Lab Results   Component Value Date    EGFR 25 01/24/2023    EGFR 18 01/23/2023    EGFR 33 01/22/2023    CREATININE 1 97 (H) 01/24/2023    CREATININE 2 57 (H) 01/23/2023    CREATININE 1 55 (H) 01/22/2023   Baseline creatinine seems to range between 1 5-1 8    Trend BMP for worsening creatinine  Avoid hypotension and nephrotoxic agents  Monitor for changes in urinary output  Track I/O's

## 2023-01-24 NOTE — ASSESSMENT & PLAN NOTE
Wt Readings from Last 3 Encounters:   01/23/23 117 kg (257 lb 8 oz)     Maintained on torsemide 40 mg daily  Stress test in August 2022 shows an ejection fraction of 70%  Patient appears to be euvolemic on this admission  Continue home medications  Monitor BMP  Magnesium level ordered  Cardiac diet ordered      Plan  Hold torsemide in the setting of NATALIA  Assess volume status daily

## 2023-01-25 PROBLEM — N17.9 AKI (ACUTE KIDNEY INJURY) (HCC): Status: RESOLVED | Noted: 2023-01-23 | Resolved: 2023-01-25

## 2023-01-25 LAB
ALBUMIN SERPL BCP-MCNC: 3.1 G/DL (ref 3.5–5)
ALBUMIN SERPL BCP-MCNC: 3.1 G/DL (ref 3.5–5)
ALP SERPL-CCNC: 79 U/L (ref 34–104)
ALP SERPL-CCNC: 79 U/L (ref 34–104)
ALT SERPL W P-5'-P-CCNC: 17 U/L (ref 7–52)
ALT SERPL W P-5'-P-CCNC: 17 U/L (ref 7–52)
ANION GAP SERPL CALCULATED.3IONS-SCNC: 4 MMOL/L (ref 4–13)
ANION GAP SERPL CALCULATED.3IONS-SCNC: 4 MMOL/L (ref 4–13)
AST SERPL W P-5'-P-CCNC: 27 U/L (ref 13–39)
AST SERPL W P-5'-P-CCNC: 27 U/L (ref 13–39)
BASOPHILS # BLD AUTO: 0.09 THOUSANDS/ÂΜL (ref 0–0.1)
BASOPHILS # BLD AUTO: 0.09 THOUSANDS/ÂΜL (ref 0–0.1)
BASOPHILS NFR BLD AUTO: 1 % (ref 0–1)
BASOPHILS NFR BLD AUTO: 1 % (ref 0–1)
BILIRUB SERPL-MCNC: 0.26 MG/DL (ref 0.2–1)
BILIRUB SERPL-MCNC: 0.26 MG/DL (ref 0.2–1)
BUN SERPL-MCNC: 34 MG/DL (ref 5–25)
BUN SERPL-MCNC: 34 MG/DL (ref 5–25)
CALCIUM ALBUM COR SERPL-MCNC: 9.1 MG/DL (ref 8.3–10.1)
CALCIUM ALBUM COR SERPL-MCNC: 9.1 MG/DL (ref 8.3–10.1)
CALCIUM SERPL-MCNC: 8.4 MG/DL (ref 8.4–10.2)
CALCIUM SERPL-MCNC: 8.4 MG/DL (ref 8.4–10.2)
CHLORIDE SERPL-SCNC: 102 MMOL/L (ref 96–108)
CHLORIDE SERPL-SCNC: 102 MMOL/L (ref 96–108)
CO2 SERPL-SCNC: 34 MMOL/L (ref 21–32)
CO2 SERPL-SCNC: 34 MMOL/L (ref 21–32)
CREAT SERPL-MCNC: 1.45 MG/DL (ref 0.6–1.3)
CREAT SERPL-MCNC: 1.45 MG/DL (ref 0.6–1.3)
EOSINOPHIL # BLD AUTO: 0.55 THOUSAND/ÂΜL (ref 0–0.61)
EOSINOPHIL # BLD AUTO: 0.55 THOUSAND/ÂΜL (ref 0–0.61)
EOSINOPHIL NFR BLD AUTO: 6 % (ref 0–6)
EOSINOPHIL NFR BLD AUTO: 6 % (ref 0–6)
ERYTHROCYTE [DISTWIDTH] IN BLOOD BY AUTOMATED COUNT: 13.8 % (ref 11.6–15.1)
ERYTHROCYTE [DISTWIDTH] IN BLOOD BY AUTOMATED COUNT: 13.8 % (ref 11.6–15.1)
GFR SERPL CREATININE-BSD FRML MDRD: 36 ML/MIN/1.73SQ M
GFR SERPL CREATININE-BSD FRML MDRD: 36 ML/MIN/1.73SQ M
GLUCOSE SERPL-MCNC: 107 MG/DL (ref 65–140)
GLUCOSE SERPL-MCNC: 107 MG/DL (ref 65–140)
GLUCOSE SERPL-MCNC: 110 MG/DL (ref 65–140)
GLUCOSE SERPL-MCNC: 110 MG/DL (ref 65–140)
GLUCOSE SERPL-MCNC: 111 MG/DL (ref 65–140)
GLUCOSE SERPL-MCNC: 111 MG/DL (ref 65–140)
GLUCOSE SERPL-MCNC: 125 MG/DL (ref 65–140)
GLUCOSE SERPL-MCNC: 125 MG/DL (ref 65–140)
GLUCOSE SERPL-MCNC: 220 MG/DL (ref 65–140)
GLUCOSE SERPL-MCNC: 220 MG/DL (ref 65–140)
GLUCOSE SERPL-MCNC: 236 MG/DL (ref 65–140)
GLUCOSE SERPL-MCNC: 236 MG/DL (ref 65–140)
HCT VFR BLD AUTO: 34.6 % (ref 34.8–46.1)
HCT VFR BLD AUTO: 34.6 % (ref 34.8–46.1)
HGB BLD-MCNC: 10.6 G/DL (ref 11.5–15.4)
HGB BLD-MCNC: 10.6 G/DL (ref 11.5–15.4)
IMM GRANULOCYTES # BLD AUTO: 0.09 THOUSAND/UL (ref 0–0.2)
IMM GRANULOCYTES # BLD AUTO: 0.09 THOUSAND/UL (ref 0–0.2)
IMM GRANULOCYTES NFR BLD AUTO: 1 % (ref 0–2)
IMM GRANULOCYTES NFR BLD AUTO: 1 % (ref 0–2)
LYMPHOCYTES # BLD AUTO: 1.43 THOUSANDS/ÂΜL (ref 0.6–4.47)
LYMPHOCYTES # BLD AUTO: 1.43 THOUSANDS/ÂΜL (ref 0.6–4.47)
LYMPHOCYTES NFR BLD AUTO: 15 % (ref 14–44)
LYMPHOCYTES NFR BLD AUTO: 15 % (ref 14–44)
MCH RBC QN AUTO: 29.8 PG (ref 26.8–34.3)
MCH RBC QN AUTO: 29.8 PG (ref 26.8–34.3)
MCHC RBC AUTO-ENTMCNC: 30.6 G/DL (ref 31.4–37.4)
MCHC RBC AUTO-ENTMCNC: 30.6 G/DL (ref 31.4–37.4)
MCV RBC AUTO: 97 FL (ref 82–98)
MCV RBC AUTO: 97 FL (ref 82–98)
MONOCYTES # BLD AUTO: 1.08 THOUSAND/ÂΜL (ref 0.17–1.22)
MONOCYTES # BLD AUTO: 1.08 THOUSAND/ÂΜL (ref 0.17–1.22)
MONOCYTES NFR BLD AUTO: 12 % (ref 4–12)
MONOCYTES NFR BLD AUTO: 12 % (ref 4–12)
NEUTROPHILS # BLD AUTO: 6.08 THOUSANDS/ÂΜL (ref 1.85–7.62)
NEUTROPHILS # BLD AUTO: 6.08 THOUSANDS/ÂΜL (ref 1.85–7.62)
NEUTS SEG NFR BLD AUTO: 65 % (ref 43–75)
NEUTS SEG NFR BLD AUTO: 65 % (ref 43–75)
NRBC BLD AUTO-RTO: 0 /100 WBCS
NRBC BLD AUTO-RTO: 0 /100 WBCS
PLATELET # BLD AUTO: 176 THOUSANDS/UL (ref 149–390)
PLATELET # BLD AUTO: 176 THOUSANDS/UL (ref 149–390)
PMV BLD AUTO: 10.8 FL (ref 8.9–12.7)
PMV BLD AUTO: 10.8 FL (ref 8.9–12.7)
POTASSIUM SERPL-SCNC: 4.7 MMOL/L (ref 3.5–5.3)
POTASSIUM SERPL-SCNC: 4.7 MMOL/L (ref 3.5–5.3)
PROT SERPL-MCNC: 6.7 G/DL (ref 6.4–8.4)
PROT SERPL-MCNC: 6.7 G/DL (ref 6.4–8.4)
RBC # BLD AUTO: 3.56 MILLION/UL (ref 3.81–5.12)
RBC # BLD AUTO: 3.56 MILLION/UL (ref 3.81–5.12)
SODIUM SERPL-SCNC: 140 MMOL/L (ref 135–147)
SODIUM SERPL-SCNC: 140 MMOL/L (ref 135–147)
WBC # BLD AUTO: 9.32 THOUSAND/UL (ref 4.31–10.16)
WBC # BLD AUTO: 9.32 THOUSAND/UL (ref 4.31–10.16)

## 2023-01-25 RX ORDER — MECLIZINE HYDROCHLORIDE 25 MG/1
25 TABLET ORAL EVERY 8 HOURS SCHEDULED
Status: DISCONTINUED | OUTPATIENT
Start: 2023-01-25 | End: 2023-01-27 | Stop reason: HOSPADM

## 2023-01-25 RX ORDER — BENZONATATE 100 MG/1
200 CAPSULE ORAL 3 TIMES DAILY PRN
Status: DISCONTINUED | OUTPATIENT
Start: 2023-01-25 | End: 2023-01-27 | Stop reason: HOSPADM

## 2023-01-25 RX ORDER — TORSEMIDE 20 MG/1
40 TABLET ORAL DAILY
Status: DISCONTINUED | OUTPATIENT
Start: 2023-01-25 | End: 2023-01-27

## 2023-01-25 RX ADMIN — INSULIN LISPRO 4 UNITS: 100 INJECTION, SOLUTION INTRAVENOUS; SUBCUTANEOUS at 17:08

## 2023-01-25 RX ADMIN — PREGABALIN 100 MG: 100 CAPSULE ORAL at 08:08

## 2023-01-25 RX ADMIN — APIXABAN 5 MG: 5 TABLET, FILM COATED ORAL at 08:08

## 2023-01-25 RX ADMIN — MECLIZINE HYDROCHLORIDE 25 MG: 25 TABLET ORAL at 21:16

## 2023-01-25 RX ADMIN — PRAVASTATIN SODIUM 80 MG: 80 TABLET ORAL at 17:07

## 2023-01-25 RX ADMIN — MONTELUKAST 10 MG: 10 TABLET, FILM COATED ORAL at 21:15

## 2023-01-25 RX ADMIN — TORSEMIDE 40 MG: 20 TABLET ORAL at 10:43

## 2023-01-25 RX ADMIN — CEFTRIAXONE SODIUM 2000 MG: 10 INJECTION, POWDER, FOR SOLUTION INTRAVENOUS at 10:43

## 2023-01-25 RX ADMIN — INSULIN LISPRO 4 UNITS: 100 INJECTION, SOLUTION INTRAVENOUS; SUBCUTANEOUS at 21:16

## 2023-01-25 RX ADMIN — GUAIFENESIN 1200 MG: 600 TABLET ORAL at 08:07

## 2023-01-25 RX ADMIN — INSULIN GLARGINE 35 UNITS: 100 INJECTION, SOLUTION SUBCUTANEOUS at 21:16

## 2023-01-25 RX ADMIN — GUAIFENESIN 1200 MG: 600 TABLET ORAL at 21:16

## 2023-01-25 RX ADMIN — METOPROLOL SUCCINATE 50 MG: 50 TABLET, EXTENDED RELEASE ORAL at 08:08

## 2023-01-25 RX ADMIN — MECLIZINE HYDROCHLORIDE 25 MG: 25 TABLET ORAL at 13:41

## 2023-01-25 RX ADMIN — APIXABAN 5 MG: 5 TABLET, FILM COATED ORAL at 17:07

## 2023-01-25 RX ADMIN — PREGABALIN 100 MG: 100 CAPSULE ORAL at 17:07

## 2023-01-25 NOTE — ASSESSMENT & PLAN NOTE
Recent Labs     01/23/23  0359 01/24/23  0725 01/25/23  0546   CREATININE 2 57* 1 97* 1 45*   EGFR 18 25 36     Estimated Creatinine Clearance: 43 4 mL/min (A) (by C-G formula based on SCr of 1 45 mg/dL (H))       Baseline 1 5  NATALIA secondary to sepsis and hypotension    PLAN  -Isolyte 100cc/hr d/c'ed 1/24  -Monitor I/O's  -Daily BMP  -Consider nephrology consultation if worsening kidney fxn

## 2023-01-25 NOTE — PLAN OF CARE
Problem: Prexisting or High Potential for Compromised Skin Integrity  Goal: Skin integrity is maintained or improved  Description: INTERVENTIONS:  - Identify patients at risk for skin breakdown  - Assess and monitor skin integrity  - Assess and monitor nutrition and hydration status  - Monitor labs   - Assess for incontinence   - Turn and reposition patient  - Assist with mobility/ambulation  - Relieve pressure over bony prominences  - Avoid friction and shearing  - Provide appropriate hygiene as needed including keeping skin clean and dry  - Evaluate need for skin moisturizer/barrier cream  - Collaborate with interdisciplinary team   - Patient/family teaching  - Consider wound care consult   Outcome: Progressing     Problem: Nutrition/Hydration-ADULT  Goal: Nutrient/Hydration intake appropriate for improving, restoring or maintaining nutritional needs  Description: Monitor and assess patient's nutrition/hydration status for malnutrition  Collaborate with interdisciplinary team and initiate plan and interventions as ordered  Monitor patient's weight and dietary intake as ordered or per policy  Utilize nutrition screening tool and intervene as necessary  Determine patient's food preferences and provide high-protein, high-caloric foods as appropriate       INTERVENTIONS:  - Monitor oral intake, urinary output, labs, and treatment plans  - Assess nutrition and hydration status and recommend course of action  - Evaluate amount of meals eaten  - Assist patient with eating if necessary   - Allow adequate time for meals  - Recommend/ encourage appropriate diets, oral nutritional supplements, and vitamin/mineral supplements  - Order, calculate, and assess calorie counts as needed  - Recommend, monitor, and adjust tube feedings based on assessed needs  - Assess need for intravenous fluids  - Provide nutrition/hydration education as appropriate  - Include patient/family/caregiver in decisions related to nutrition  Outcome: Progressing     Problem: DISCHARGE PLANNING  Goal: Discharge to home or other facility with appropriate resources  Description: INTERVENTIONS:  - Identify barriers to discharge w/patient and caregiver  - Arrange for needed discharge resources and transportation as appropriate  - Identify discharge learning needs (meds, wound care, etc )  - Arrange for interpretive services to assist at discharge as needed  - Refer to Case Management Department for coordinating discharge planning if the patient needs post-hospital services based on physician/advanced practitioner order or complex needs related to functional status, cognitive ability, or social support system  Outcome: Progressing

## 2023-01-25 NOTE — ASSESSMENT & PLAN NOTE
Secondary to community-acquired pneumonia    Patient presents with leukocytosis, tachycardia and suspected source of right sided multifocal pneumonia  Please refer to A&P under community-acquired pneumonia    Sputum cx (1/22): gram + rods 3+, gram positive cocci in pairs 3+, gram negative rods 4+     Lab Results   Component Value Date    WBC 9 32 01/25/2023    WBC 11 07 (H) 01/24/2023    WBC 15 36 (H) 01/23/2023    WBC 15 76 (H) 01/22/2023    PROCALCITONI 0 83 (H) 01/24/2023    PROCALCITONI 1 28 (H) 01/23/2023    PROCALCITONI 0 13 01/22/2023     Lab Results   Component Value Date    BLOODCX No Growth at 48 hrs  01/22/2023    BLOODCX No Growth at 48 hrs  01/22/2023       PLAN  -Fluids normal saline 100 cc/h - d/c'ed 1/24  -Follow-up blood cultures   -Monitor temperature curve, CBC  -Follow-up blood cultures   -Monitor temperature curve, CBC  -IV Ceftriaxone 2g in 24 hours (1st dose 1/22) - day 4  -Urine strep and legionella - needs to be collected

## 2023-01-25 NOTE — Clinical Note
Yeny Haque is a 81 y o  Female who presents to [facility] on 1/22/2023 from home w/ c/o syncope off toilet and diagnosis of pneumonia  Orders for PT eval and treat received  Pt presents w/ comorbidities of COPD, DMII, CHF, Afib, HTN, OSMANY  At baseline, pt mobilizes I w/ no AD, and reports 1 falls in the last 6 months  Upon evaluation, pt presents w/ the following deficits: weakness, impaired balance, decreased endurance and dizziness impacting functional mobility  Upon eval, pt requires *** for bed mobility, *** for transfers, and *** for gait  Given the above findings, discharge recommendation is for {abwdischargerecommendation:[id number]}  During this admission, pt would benefit from continued skilled inpatient PT in the acute care setting in order to address the abovementioned deficits to maximize function and mobility before DC from acute care

## 2023-01-25 NOTE — ASSESSMENT & PLAN NOTE
Wt Readings from Last 3 Encounters:   01/25/23 115 kg (252 lb 13 9 oz)     Maintained on torsemide 40 mg daily  Stress test in August 2022 shows an ejection fraction of 70%  Patient appears to be euvolemic on this admission  Continue home medications  Monitor BMP  Magnesium level ordered  Cardiac diet ordered      PLAN  -Held home torsemide in the setting of NATALIA - restart home Torsemide 40mg daily (1/25)  -Assess volume status daily

## 2023-01-25 NOTE — ASSESSMENT & PLAN NOTE
Lab Results   Component Value Date    HGBA1C 11 0 (H) 01/23/2023       Recent Labs     01/24/23  1717 01/24/23  2053 01/24/23  2303 01/25/23  0539   POCGLU 161* 216* 228* 111       Blood Sugar Average: Last 72 hrs:  (P) 227 8119  Blood sugar appears to be poorly controlled on this admission  Patient is maintained on Toujeo 25 units before breakfast and 25 units before bedtime along with mealtime Humalog    Patient admits to only checking her blood sugars and taking mealtime insulin at breakfast     PLAN:  -HBA1C- 11%  -Continue home Toujeo 35 units daily  -Sliding scale insulin ordered for mealtime insulin  -Hypoglycemia protocol  -Diabetic/cardiac diet ordered  -Reinforced importance of blood glucose monitoring

## 2023-01-25 NOTE — ASSESSMENT & PLAN NOTE
Lab Results   Component Value Date    EGFR 36 01/25/2023    EGFR 25 01/24/2023    EGFR 18 01/23/2023    CREATININE 1 45 (H) 01/25/2023    CREATININE 1 97 (H) 01/24/2023    CREATININE 2 57 (H) 01/23/2023   Baseline creatinine seems to range between 1 5-1 8      PLAN  -Trend BMP for worsening creatinine  -Avoid hypotension and nephrotoxic agents  -Monitor for changes in urinary output  -Track I/O's

## 2023-01-25 NOTE — PHYSICAL THERAPY NOTE
PHYSICAL THERAPY EVALUATION NOTE    Patient Name: Tiesha BELTRAN Date: 2023    AGE:   81 y o  Mrn:   [id number]  ADMIT DX:  Open wound [T14  8XXA]  Weakness [R53 1]    Past Medical History:   Diagnosis Date    [facility]     Length Of Stay: 3  PHYSICAL THERAPY EVALUATION :   Patient's identity confirmed via 2 patient identifiers (full name and ) at start of session       23 1528   PT Last Visit   PT Visit Date 23   Note Type   Note type Evaluation   Pain Assessment   Pain Assessment Tool 0-10   Pain Score No Pain   Restrictions/Precautions   Weight Bearing Precautions Per Order No   Other Precautions Chair Alarm; Bed Alarm;Multiple lines;O2;Fall Risk  (2L NC O2)   Home Living   Type of Home Mobile home   Home Layout One level;Stairs to enter with rails  (3 VI)   Home Equipment Other (Comment)  (O2)   Additional Comments Pt reports being I PTA   Prior Function   Level of [place] Independent with ADLs; Independent with functional mobility   Lives With Other (Comment)  (Niece, 3 young great-nieces)   Receives Help From Family   IADLs Family/Friend/Other provides transportation   Falls in the last 6 months 1 to 4   Vocational Retired  ([employer])   General   Additional Pertinent History see flowsheet (also below) for attempted orthostatic vitals   Family/Caregiver Present No   Cognition   Overall Cognitive Status WFL   Arousal/Participation Alert   Orientation Level Oriented X4   Memory Decreased recall of recent events   Following Commands Follows multistep commands with increased time or repetition   Comments Pt agreeable to participate in PT eval   RLE Assessment   RLE Assessment WFL   Strength RLE   RLE Overall Strength 3+/5   LLE Assessment   LLE Assessment WFL   Strength LLE   LLE Overall Strength 3+/5   Vision-Basic Assessment   Current Vision Wears glasses only for reading   Bed Mobility   Supine to Sit 4  Minimal assistance   Additional items Assist x 1; Increased time required;Verbal cues;LE management   Sit to Supine Unable to assess   Transfers   Sit to Stand 4  Minimal assistance   Additional items Assist x 1; Increased time required;Verbal cues   Stand to Sit 4  Minimal assistance   Additional items Assist x 1; Increased time required;Verbal cues   Ambulation/Elevation   Gait pattern Decreased foot clearance; Excessively slow   Gait Assistance 4  Minimal assist   Additional items Assist x 1   Assistive Device Rolling walker   Distance 6 ft   Balance   Static Sitting Fair   Static Standing Fair -   Ambulatory Poor +   Activity Tolerance   Activity Tolerance Patient limited by fatigue   Medical Staff Made Aware OT Zehra Parham; Spoke to [address], [address] to ANDREINA Mckeon   Assessment   Problem List Decreased strength;Decreased endurance;Decreased mobility; Impaired balance;Pain;Decreased skin integrity   Assessment Becca Spencer is a 81 y o  Female who [facility] on 1/22/2023 from home w/ c/o syncope off toilet and diagnosis of pneumonia  Orders for PT eval and treat received  Pt presents w/ comorbidities of COPD, DMII, CHF, Afib, HTN, OSMANY  At baseline, pt mobilizes I w/ no AD, and reports 1 falls in the last 6 months  Upon evaluation, pt presents w/ the following deficits: weakness, impaired balance, decreased endurance and dizziness impacting functional mobility  Upon eval, pt requires min A x 1 for bed mobility, min A x 1 for transfers, and min A x 1 for gait  Given the above findings, discharge recommendation is for Post-acute inpatient rehabilitation  During this admission, pt would benefit from continued skilled inpatient PT in the acute care setting in order to address the abovementioned deficits to maximize function and mobility before DC from acute care     Goals   Patient Goals to get up and moving to be able to go home and care for her great-nieces again STG Expiration Date 02/04/23   Short Term Goal #1 Patient will: Increase bilateral LE strength 1/2 grade to facilitate independent mobility, Perform all bed mobility tasks modified independent to decrease fall risk factors, Perform all transfers modified independent to improve independence, Ambulate at least 150 ft  +/- LRAD w/ supervision w/o LOB to facilitate return home and caring for family, Navigate 3 stairs w/ supervision with unilateral handrail to facilitate return to previous living environment, Increase all balance 1/2 grade to decrease risk for falls, Complete exercise program independently and Tolerate 3 hr OOB to faciliate upright tolerance   PT Treatment Day 0   Plan   Treatment/Interventions Functional transfer training;LE strengthening/ROM; Elevations; Therapeutic exercise; Endurance training;Patient/family training;Equipment eval/education; Bed mobility;Gait training   PT Frequency 3-5x/wk   Recommendation   PT Discharge Recommendation Post acute rehabilitation services   Equipment Recommended [address] [facility] Recommended Wheeled walker   Change/add to [email or web address]? No   AM-PAC Basic Mobility Inpatient   Turning in Flat Bed Without Bedrails 3   Lying on Back to Sitting on Edge of Flat Bed Without Bedrails 3   Moving Bed to Chair 3   Standing Up From Chair Using Arms 3   Walk in Room 3   Climb 3-5 Stairs With Railing 1   Basic Mobility Inpatient Raw Score 16   Basic Mobility Standardized Score 38 32   Highest Level Of Mobility   JH-HLM Goal 5: Stand one or more mins   JH-HLM Achieved 6: Walk 10 steps or more   Additional Treatment Session   Start Time 1518   End Time 1528   Treatment Assessment Pt reports needing to use the bathroom  Pt requires min A x 1 for STS transfer from recliner chair  With RW, pt is able to ambulate 5 ft w/ min A x 1 to commode  Pt on/off commode x 2 due to not finishing urinating and needing A w/ cleaning   Pt then returndto recliner chair w/ min A x 1 for 5 ft Equipment Use RW   Additional Treatment Day 1   End of Consult   Patient Position at End of Consult Bedside chair;Bed/Chair alarm activated; All needs within reach         The patient's AM-PAC Basic Mobility Inpatient Short Form Raw Score is 16, Standardized Score is 38 32  A standardized score less than 38 32 (raw score of 16) suggests the patient may benefit from discharge to post-acute rehabilitation services which may not coincide with above PT recommendations  However please refer to therapist recommendation for discharge planning given other factors that may influence destination      Pt would benefit from skilled inpatient PT during this admission in order to facilitate progress towards goals to maximize functional independence    Regina Barrera, PT, DPT

## 2023-01-25 NOTE — ASSESSMENT & PLAN NOTE
Fall following syncopal episode at home, home pulse ox 88%  CT head and trauma series negative    PLAN  -Fall precautions   Tylenol 650 q6h PRN for mild pain   -PT/OT  -Orthostatic vitals 1/25

## 2023-01-25 NOTE — ASSESSMENT & PLAN NOTE
Patient has past medical history obstructive sleep apnea for which she is supposed to be using a CPAP at night  Patient reports poor compliance with CPAP due to discomfort with the mask  CPAP ordered for this admission       PLAN  -Needs referral to sleep medicine on discharge to discuss difficulties with compliance  -Continue home 2L NC overnight during admission   -Add humidifier to NC to avoid nasal dryness and epistaxis

## 2023-01-25 NOTE — ASSESSMENT & PLAN NOTE
Maintained on albuterol every 6 hours as needed, daily fluticasone, Singulair nightly  Patient uses 2 L nasal cannula oxygen at nighttime      PLAN:  -Continue home maintenance medications- singulair  -Prn xopenex on 1/23/2023  -Continue supplementary oxygen to maintain O2 sats between 88 and 92% and wean as tolerated

## 2023-01-25 NOTE — ASSESSMENT & PLAN NOTE
Patient presents after syncopal episode while sitting on the toilet  Past medical history significant for atrial fibrillation maintained on metoprolol and Eliquis, CHF on torsemide 40 mg daily, and type 2 diabetes  Patient says she went to the bathroom earlier this morning and was found on the bathroom floor by her granddaughter  Patient does not recall prodromal symptoms prior to passing out but says she may have been trying to stand up  She denies straining just before losing consciousness  Granddaughter reports O2 saturation at the time of finding the patient was approximately 88%  Patient found to be tachycardic and hypertensive on POA but she did not take her metoprolol this morning  EKG shows sinus tachycardia  Troponins are negative  Stress test from August 2022 shows an EF 70%  As of now there is an unclear etiology of why the patient lost consciousness, however tachyarrhythmia and orthostatic hypotension are certainly possibilities  She will need further work-up and observation      Ddx: orthostatic hypotension vs  low BP in s/o PNA infx vs  tachyarrhythmia     PLAN:  -Orthostatics ordered for today 1/25  -Continue Telemetry  -Trend BMP  -Fall precautions ordered

## 2023-01-25 NOTE — ASSESSMENT & PLAN NOTE
Patient has history of open wound on right heel for which she follows up with wound care as outpatient  Family seems to be very supportive and helping keep the wound clean  Past medical history is significant for type 2 diabetes  Neurovascular exam of both feet appears to be intact  Wound on right heel is exposed but does not appear to be purulent or infected        Podiatry recs: Ulcer appears clinically stable and noninfected  Right heel dressed with Maxorb Ag and Allevyn foam pad  Can change dressing every other day with wound care  No podiatric interventions warranted during this admission    PLAN  -Podiatry consulted, reecommendations appreciated as below  -Consult wound care for dressing change every other day

## 2023-01-25 NOTE — PLAN OF CARE
Problem: Prexisting or High Potential for Compromised Skin Integrity  Goal: Skin integrity is maintained or improved  Description: INTERVENTIONS:  - Identify patients at risk for skin breakdown  - Assess and monitor skin integrity  - Assess and monitor nutrition and hydration status  - Monitor labs   - Assess for incontinence   - Turn and reposition patient  - Assist with mobility/ambulation  - Relieve pressure over bony prominences  - Avoid friction and shearing  - Provide appropriate hygiene as needed including keeping skin clean and dry  - Evaluate need for skin moisturizer/barrier cream  - Collaborate with interdisciplinary team   - Patient/family teaching  - Consider wound care consult   Outcome: Progressing     Problem: Nutrition/Hydration-ADULT  Goal: Nutrient/Hydration intake appropriate for improving, restoring or maintaining nutritional needs  Description: Monitor and assess patient's nutrition/hydration status for malnutrition  Collaborate with interdisciplinary team and initiate plan and interventions as ordered  Monitor patient's weight and dietary intake as ordered or per policy  Utilize nutrition screening tool and intervene as necessary  Determine patient's food preferences and provide high-protein, high-caloric foods as appropriate       INTERVENTIONS:  - Monitor oral intake, urinary output, labs, and treatment plans  - Assess nutrition and hydration status and recommend course of action  - Evaluate amount of meals eaten  - Assist patient with eating if necessary   - Allow adequate time for meals  - Recommend/ encourage appropriate diets, oral nutritional supplements, and vitamin/mineral supplements  - Order, calculate, and assess calorie counts as needed  - Recommend, monitor, and adjust tube feedings based on assessed needs  - Assess need for intravenous fluids  - Provide nutrition/hydration education as appropriate  - Include patient/family/caregiver in decisions related to nutrition  Outcome: Progressing     Problem: PAIN - ADULT  Goal: Verbalizes/displays adequate comfort level or baseline comfort level  Description: Interventions:  - Encourage patient to monitor pain and request assistance  - Assess pain using appropriate pain scale  - Administer analgesics based on type and severity of pain and evaluate response  - Implement non-pharmacological measures as appropriate and evaluate response  - Consider cultural and social influences on pain and pain management  - Notify physician/advanced practitioner if interventions unsuccessful or patient reports new pain  Outcome: Progressing

## 2023-01-25 NOTE — PLAN OF CARE
Problem: OCCUPATIONAL THERAPY ADULT  Goal: Performs self-care activities at highest level of function for planned discharge setting  See evaluation for individualized goals  Description: Treatment Interventions: ADL retraining, Functional transfer training, Patient/family training, Equipment evaluation/education, Continued evaluation, Energy conservation, Activityengagement, Endurance training (Simultaneous filing  User may not have seen previous data )          See flowsheet documentation for full assessment, interventions and recommendations  Note: Limitation: Decreased ADL status, Decreased Safe judgement during ADL, Decreased endurance, Decreased self-care trans, Decreased high-level ADLs (Simultaneous filing  User may not have seen previous data )  Prognosis: Good (Simultaneous filing  User may not have seen previous data )  Assessment: Pt is a 81 y o  female seen for OT evaluation s/p admission to [address] [place] [address] on 1/22/2023 due to syncopal episode resulting in fall  Pt diagnosed with Pneumonia  Pt has a significant PMH impacting occupational performance including: COVID, type II Diabetes, CHF, COPD, and hypertension  Pt with 1 recent admissions in the last 2 months  PTA pt living in [facility] with niece, (A) with ADLs and IADLs, (+) falls, (+) driving  Pt is motivated to return to home  Personal and environmental factors supporting pt at time of IE include social support and accessible home environment  Personal and environmental factors inhibiting engagement in occupations include difficulty completing ADLs and difficulty completing IADLs  During evaluation pt performed as is outlined above in flowsheet  Pt required VC for safety and seated rest breaks  Standardized assessments used to assist in identifying performance deficits include AMPAC 6-Clicks and [place] ADL Index   Performance deficits that affect the pt’s occupational performance during the initial evaluation include impaired balance, functional mobility, endurance, activity tolerance, forward functional reach, functional standing tolerance and overall strength, safety awareness  Based on pt’s functional performance and deficits the following occupations will be addressed in OT treatments in order to maximize pt’s independence and overall occupational performance:  Goals are listed below  Upon discharge from acute care setting recommend d/c to [address] [place] [address]  OT Discharge Recommendation: Post acute rehabilitation services (Simultaneous filing   User may not have seen previous data )     NICOLASA Ayala

## 2023-01-25 NOTE — ASSESSMENT & PLAN NOTE
Home meds include Toprol-XL 50 mg and torsemide 40 mg daily  Hold above meds for now- 1/23/2023  metoprolol 50mg daily restarted 1/24     PLAN  -Continue metoprolol 50mg daily   -Restart home Torsemide 40mg daily

## 2023-01-25 NOTE — PROGRESS NOTES
[facility]  Progress Note Peach [place]ater 1950, 81 y o  female MRN: [id number]  Unit/Bed#: S -[facility] Encounter: [id number]  Primary Care Provider: Niyah Stiles MD   Date and time admitted to hospital: 1/22/2023  9:17 AM    * Pneumonia  Assessment & Plan  Patient presents after syncopal episode while being on the toilet  Past medical history significant for COPD, CHF, obstructive sleep apnea with poor CPAP compliance  Granddaughter found patient unconscious on the floor  O2 sat at that time was reportedly 88%  Patient reports not feeling well for the past several days complaining of productive cough with green sputum  Patient denies fever, chills or sweats  No chest pain or complaints of being unusually short of breath reported  She is found to be tachycardic on arrival to the emergency department, patient does admit to not taking her metoprolol this morning  Work-up in the emergency department significant for leukocytosis and CT concerning for multifocal right sided pneumonia  Given 500 cc bolus and one-time dose of Rocephin in the emergency department  Physical exam mostly noncontributory due to large body habitus        Sputum cx - gram positive rods 3+, gram positive cocci in pairs 3+, gram negative rods 4+   Blood cx - [id number]     Procal downtrending   Lab Results   Component Value Date    PROCALCITONI 0 83 (H) 01/24/2023    PROCALCITONI 1 28 (H) 01/23/2023    PROCALCITONI 0 13 01/22/2023     WBC downtrending   Lab Results   Component Value Date    WBC 9 32 01/25/2023    WBC 11 07 (H) 01/24/2023    WBC 15 36 (H) 01/23/2023    WBC 15 76 (H) 01/22/2023         PLAN  -Continue IV Ceftriaxone 2g daily (first dose 1/22) through day 4 (1/25) then transition to PO tomorrow (1/26) to complete a 5 day course   -Mucinex 1200 q12 hours  -Tessalon perles PRN for cough   -Trend CBC  -Incentive spirometry and flutter valve  -Continue supplementary oxygen to maintain O2 sat between 88-92%      Sepsis [facility])  Assessment & Plan  Secondary to community-acquired pneumonia  Patient presents with leukocytosis, tachycardia and suspected source of right sided multifocal pneumonia  Please refer to A&P under community-acquired pneumonia    Sputum cx (1/22): gram + rods 3+, gram positive cocci in pairs 3+, gram negative rods 4+     Lab Results   Component Value Date    WBC 9 32 01/25/2023    WBC 11 07 (H) 01/24/2023    WBC 15 36 (H) 01/23/2023    WBC 15 76 (H) 01/22/2023    PROCALCITONI 0 83 (H) 01/24/2023    PROCALCITONI 1 28 (H) 01/23/2023    PROCALCITONI 0 13 01/22/2023     Lab Results   Component Value Date    BLOODCX No Growth at 48 hrs  01/22/2023    BLOODCX No Growth at 48 hrs  01/22/2023       PLAN  -Fluids normal saline 100 cc/h - d/c'ed 1/24  -Follow-up blood cultures   -Monitor temperature curve, CBC  -Follow-up blood cultures   -Monitor temperature curve, CBC  -IV Ceftriaxone 2g in 24 hours (1st dose 1/22) - day 4  -Urine strep and legionella - needs to be collected         Morbid obesity with BMI of 50 0-59 9, adult [facility])  Assessment & Plan  PLAN  Follow up with PCP for weight loss management at [place]    Open wound  Assessment & Plan  Patient has history of open wound on right heel for which she follows up with wound care as outpatient  Family seems to be very supportive and helping keep the wound clean  Past medical history is significant for type 2 diabetes  Neurovascular exam of both feet appears to be intact  Wound on right heel is exposed but does not appear to be purulent or infected        Podiatry recs: Ulcer appears clinically stable and noninfected  Right heel dressed with Maxorb Ag and Allevyn foam pad  Can change dressing every other day with wound care  No podiatric interventions warranted during this admission    PLAN  -Podiatry consulted, reecommendations appreciated as below  -Consult wound care for dressing change every other day     Obstructive sleep apnea  Assessment & Plan  Patient has past medical history obstructive sleep apnea for which she is supposed to be using a CPAP at night  Patient reports poor compliance with CPAP due to discomfort with the mask  CPAP ordered for this admission  PLAN  -Needs referral to sleep medicine on discharge to discuss difficulties with compliance  -Continue home 2L NC overnight during admission   -Add humidifier to NC to avoid nasal dryness and epistaxis         Hypertension  Assessment & Plan  Home meds include Toprol-XL 50 mg and torsemide 40 mg daily  Hold above meds for now- 1/23/2023  metoprolol 50mg daily restarted 1/24     PLAN  -Continue metoprolol 50mg daily   -Restart home Torsemide 40mg daily       CKD (chronic kidney disease)  Assessment & Plan  Lab Results   Component Value Date    EGFR 36 01/25/2023    EGFR 25 01/24/2023    EGFR 18 01/23/2023    CREATININE 1 45 (H) 01/25/2023    CREATININE 1 97 (H) 01/24/2023    CREATININE 2 57 (H) 01/23/2023   Baseline creatinine seems to range between 1 5-1 8  PLAN  -Trend BMP for worsening creatinine  -Avoid hypotension and nephrotoxic agents  -Monitor for changes in urinary output  -Track I/O's     Atrial fibrillation (HCC)  Assessment & Plan  Maintained on Toprol-XL 50 mg daily and Eliquis 5 mg twice daily  Patient is presenting for syncopal episode  EKG on this admission shows sinus tachycardia with heart rate of 113, patient noted to be septic on this admission and she admits to not taking her metoprolol this morning  PLAN  -Continue telemetry to monitor rates closely  -Home metoprolol held due to incidental hypotension on 1/23/2023 and NATALIA but restarted metoprolol 50mg daily 1/24  -Monitor BMP  -Mag >2, k>4        CHF (congestive heart failure) (HCC)  Assessment & Plan  Wt Readings from Last 3 Encounters:   01/25/23 115 kg (252 lb 13 9 oz)     Maintained on torsemide 40 mg daily  Stress test in August 2022 shows an ejection fraction of 70%    Patient appears to be euvolemic on this admission  Continue home medications  Monitor BMP  Magnesium level ordered  Cardiac diet ordered  PLAN  -Held home torsemide in the setting of NATALIA - restart home Torsemide 40mg daily (1/25)  -Assess volume status daily    Diabetes [facility])  Assessment & Plan  Lab Results   Component Value Date    HGBA1C 11 0 (H) 01/23/2023       Recent Labs     01/24/23  1717 01/24/23  2053 01/24/23  2303 01/25/23  0539   POCGLU 161* 216* 228* 111       Blood Sugar Average: Last 72 hrs:  (P) 227 8125  Blood sugar appears to be poorly controlled on this admission  Patient is maintained on Toujeo 25 units before breakfast and 25 units before bedtime along with mealtime Humalog  Patient admits to only checking her blood sugars and taking mealtime insulin at breakfast     PLAN:  -HBA1C- 11%  -Continue home Toujeo 35 units daily  -Sliding scale insulin ordered for mealtime insulin  -Hypoglycemia protocol  -Diabetic/cardiac diet ordered  -Reinforced importance of blood glucose monitoring    COPD (chronic obstructive pulmonary disease) ([facility])  Assessment & Plan  Maintained on albuterol every 6 hours as needed, daily fluticasone, Singulair nightly  Patient uses 2 L nasal cannula oxygen at nighttime  PLAN:  -Continue home maintenance medications- singulair  -Prn xopenex on 1/23/2023  -Continue supplementary oxygen to maintain O2 sats between 88 and 92% and wean as tolerated     Syncope  Assessment & Plan  Patient presents after syncopal episode while sitting on the toilet  Past medical history significant for atrial fibrillation maintained on metoprolol and Eliquis, CHF on torsemide 40 mg daily, and type 2 diabetes  Patient says she went to the bathroom earlier this morning and was found on the bathroom floor by her granddaughter  Patient does not recall prodromal symptoms prior to passing out but says she may have been trying to stand up  She denies straining just before losing consciousness  Granddaughter reports O2 saturation at the time of finding the patient was approximately 88%  Patient found to be tachycardic and hypertensive on POA but she did not take her metoprolol this morning  EKG shows sinus tachycardia  Troponins are negative  Stress test from August 2022 shows an EF 70%  As of now there is an unclear etiology of why the patient lost consciousness, however tachyarrhythmia and orthostatic hypotension are certainly possibilities  She will need further work-up and observation  Ddx: orthostatic hypotension vs  low BP in s/o PNA infx vs  tachyarrhythmia     PLAN:  -Orthostatics ordered for today 1/25  -Continue Telemetry  -Trend BMP  -Fall precautions ordered      Tompa U  68  following syncopal episode at home, home pulse ox 88%  CT head and trauma series negative    PLAN  -Fall precautions   Tylenol 650 q6h PRN for mild pain   -PT/OT  -Orthostatic vitals 1/25    NATALIA (acute kidney injury) (HCC)-resolved as of 1/25/2023  Assessment & Plan  Recent Labs     01/23/23  0359 01/24/23  0725 01/25/23  0546   CREATININE 2 57* 1 97* 1 45*   EGFR 18 25 36     Estimated Creatinine Clearance: 43 4 mL/min (A) (by C-G formula based on SCr of 1 45 mg/dL (H))  Baseline 1 5  NATALIA secondary to sepsis and hypotension    PLAN  -Isolyte 100cc/hr d/c'ed 1/24  -Monitor I/O's  -Daily BMP  -Consider nephrology consultation if worsening kidney fxn        VTE Pharmacologic Prophylaxis: VTE Score: 9 High Risk (Score >/= 5) - Pharmacological DVT Prophylaxis Ordered: apixaban (Eliquis)  Sequential Compression Devices Ordered  Patient Centered Rounds: I performed bedside rounds with nursing staff today  Discussions with Specialists or Other Care Team Provider: none    Education and Discussions with Family / Patient: Updated  (sister) via phone  Time Spent for Care: 30 minutes   More than 50% of total time spent on counseling and coordination of care as described above     Current Length of Stay: 3 day(s)  Current Patient Status: Inpatient   Certification Statement: The patient will continue to require additional inpatient hospital stay due to PT assesment and ongoing Pneumonia management  Discharge Plan: Anticipate discharge in 24-48 hrs to discharge location to be determined pending rehab evaluations  Code Status: Level 1 - Full Code  Subjective:   Ms Hattie Jimenez is a 81 y o  F with a PMH of HTN, T2DM (on lantus 35U at night), COPD, OSMANY (2L NC overnight, doesn't tolerate CPAP), CHF, atrial fibrillation (on Eliquis 5mg and metoprolol 50mg), and CKD (BL Cr 1 5-1 8) who presents after losing consciousness on the toilet the morning of admission and several days of productive cough  No acute events overnight  This morning, patient reports she's still experiencing headaches (7/10 in severity) that improve with tylenol  She doesn't have much of an appetite but has been trying to eat and drink because she knows it's important  She had a nose bleed this morning due to the O2 NC  She continues to have lightheadedness/dizziness while laying in bed, she has yet to attempt getting out of bed  Her shortness of breath and productive cough are still present and unchanged from yesterday  Her purewick is also leaking, which is making her very uncomfortable  Objective:     Vitals:   Temp (24hrs), Av 9 °F (37 2 °C), Min:98 3 °F (36 8 °C), Max:99 2 °F (37 3 °C)    Temp:  [98 3 °F (36 8 °C)-99 2 °F (37 3 °C)] 98 3 °F (36 8 °C)  HR:  [77-90] 85  Resp:  [16] 16  BP: (124-171)/(55-80) 167/74  SpO2:  [91 %-96 %] 95 %  Body mass index is 46 25 kg/m²  Input and Output Summary (last 24 hours): Intake/Output Summary (Last 24 hours) at 2023 1234  Last data filed at 2023 2301  Gross per 24 hour   Intake 300 ml   Output --   Net 300 ml       Physical Exam:   Physical Exam  Constitutional:       Appearance: She is obese     Cardiovascular:      Rate and Rhythm: Normal rate and regular rhythm  Pulses: Normal pulses  Heart sounds: Normal heart sounds  Pulmonary:      Effort: Pulmonary effort is normal  No respiratory distress  Breath sounds: Wheezing present  Comments: R-side wheezing   Abdominal:      General: Abdomen is flat  Palpations: Abdomen is soft  Musculoskeletal:      Comments: Trace b/l LE edema    Skin:     General: Skin is warm and dry  Findings: Bruising present  Comments: Bruising around right orbit mildly improved from yesterdays exam            Additional Data:     Labs:  Results from last 7 days   Lab Units 01/25/23  0546   WBC Thousand/uL 9 32   HEMOGLOBIN g/dL 10 6*   HEMATOCRIT % 34 6*   PLATELETS Thousands/uL 176   NEUTROS PCT % 65   LYMPHS PCT % 15   MONOS PCT % 12   EOS PCT % 6     Results from last 7 days   Lab Units 01/25/23  0546   SODIUM mmol/L 140   POTASSIUM mmol/L 4 7   CHLORIDE mmol/L 102   CO2 mmol/L 34*   BUN mg/dL 34*   CREATININE mg/dL 1 45*   ANION GAP mmol/L 4   CALCIUM mg/dL 8 4   ALBUMIN g/dL 3 1*   TOTAL BILIRUBIN mg/dL 0 26   ALK PHOS U/L 79   ALT U/L 17   AST U/L 27   GLUCOSE RANDOM mg/dL 107         Results from last 7 days   Lab Units 01/25/23  1058 01/25/23  0734 01/25/23  0539 01/24/23  2303 01/24/23  2053 01/24/23  1717 01/24/23  1021 01/24/23  0710 01/23/23  2044 01/23/23  1642 01/23/23  1114 01/23/23  0723   POC GLUCOSE mg/dl 125 110 111 228* 216* 161* 191* 99 180* 162* 151* 109     Results from last 7 days   Lab Units 01/23/23  1722   HEMOGLOBIN A1C % 11 0*     Results from last 7 days   Lab Units 01/24/23  0725 01/23/23  0404 01/22/23  1042   LACTIC ACID mmol/L  --   --  1 1   PROCALCITONIN ng/ml 0 83* 1 28* 0 13       Lines/Drains:  Invasive Devices     Peripheral Intravenous Line  Duration           Peripheral IV 01/22/23 Right;Upper Arm 3 days                      Imaging: No pertinent imaging reviewed      Recent Cultures (last 7 days):   Results from last 7 days   Lab Units 01/23/23  1431 01/22/23  1042   BLOOD CULTURE   --  No Growth at 48 hrs  No Growth at 48 hrs  SPUTUM CULTURE  Culture too young- will reincubate  --    GRAM STAIN RESULT  1+ Epithelial cells per low power field*  3+ Polys*  4+ Gram negative rods*  3+ Gram positive cocci in pairs*  3+ Gram positive rods*  --        Last 24 Hours Medication List:   Current Facility-Administered Medications   Medication Dose Route Frequency Provider Last Rate   • acetaminophen  650 mg Oral Q6H PRN Deonna Melo MD     • apixaban  5 mg Oral BID Deonna Melo MD     • benzonatate  200 mg Oral TID PRN Kesha Nieto MD     • cefTRIAXone  2,000 mg Intravenous Q24H Mau Pope PA-C 2,000 mg (01/25/23 1043)   • fluticasone  1 spray Nasal Daily Deonna Melo MD     • guaiFENesin  1,200 mg Oral Q12H [facility] Dwight Arana DO     • insulin glargine  35 Units Subcutaneous HS Marilyn Stiles DO     • insulin lispro  2-12 Units Subcutaneous 4x Daily (AC & HS) Satnam Arnold DO     • levalbuterol  1 25 mg Nebulization Q8H PRN Satnam Arnold DO     • metoprolol succinate  50 mg Oral Daily Rosy Sanford MD     • montelukast  10 mg Oral HS Deonna Melo MD     • ondansetron  4 mg Oral Q6H PRN Mau Pope PA-C     • polyethylene glycol  17 g Oral Daily PRN Deonna Melo MD     • polyethylene glycol  17 g Oral Daily Rosy Sanford MD     • pravastatin  80 mg Oral Daily With Sancho Ricardo MD     • pregabalin  100 mg Oral BID Deonna Melo MD     • senna-docusate sodium  1 tablet Oral BID Rosy Sanford MD     • torsemide  40 mg Oral Daily Satnam Arnold DO          Today, Patient Was Seen By: Rosy Sanford MD    **Please Note: This note may have been constructed using a voice recognition system  **

## 2023-01-25 NOTE — PLAN OF CARE
Problem: PHYSICAL THERAPY ADULT  Goal: Performs mobility at highest level of function for planned discharge setting  See evaluation for individualized goals  Description: Treatment/Interventions: Functional transfer training, LE strengthening/ROM, Elevations, Therapeutic exercise, Endurance training, Patient/family training, Equipment eval/education, Bed mobility, Gait training  Equipment Recommended: Jigar Monet       See flowsheet documentation for full assessment, interventions and recommendations  1/25/2023 1724 by Christina Rodrigez, PT  Note:    Problem List: Decreased strength, Decreased endurance, Decreased mobility, Impaired balance, Pain, Decreased skin integrity  Assessment: Shruti Driscoll is a 81 y o  Female who [facility] from home w/ c/o syncope off toilet and diagnosis of pneumonia  Orders for PT eval and treat received  Pt presents w/ comorbidities of COPD, DMII, CHF, Afib, HTN, OSMANY  At baseline, pt mobilizes I w/ no AD, and reports 1 falls in the last 6 months  Upon evaluation, pt presents w/ the following deficits: weakness, impaired balance, decreased endurance and dizziness impacting functional mobility  Upon eval, pt requires min A x 1 for bed mobility, min A x 1 for transfers, and min A x 1 for gait  Given the above findings, discharge recommendation is for Post-acute inpatient rehabilitation  During this admission, pt would benefit from continued skilled inpatient PT in the acute care setting in order to address the abovementioned deficits to maximize function and mobility before DC from acute care  PT Discharge Recommendation: Post acute rehabilitation services    See flowsheet documentation for full assessment

## 2023-01-25 NOTE — OCCUPATIONAL THERAPY NOTE
Occupational Therapy Evaluation     Patient Name: Malinda FARMER Date: 1/25/2023  Problem List  Principal Problem:    Pneumonia  Active Problems:    Fall    Sepsis [facility])    Syncope    COPD (chronic obstructive pulmonary disease) (HCC)    Diabetes (HCC)    CHF (congestive heart failure) (HCC)    Atrial fibrillation (HCC)    CKD (chronic kidney disease)    Hypertension    Obstructive sleep apnea    Open wound    Morbid obesity with BMI of 50 0-59 9, adult [facility])    Past Medical History  Past Medical History:   Diagnosis Date    NATALIA (acute kidney injury) ([address] ) 1/23/2023     Pt seen 145 to 1527 (entered data in flowsheet as 0327)  01/25/23 0327   OT Last Visit   OT Visit Date 01/25/23   Note Type   Note type Evaluation   Pain Assessment   Pain Assessment Tool 0-10   Pain Score No Pain   Restrictions/Precautions   Weight Bearing Precautions Per Order No   Other Precautions Chair Alarm; Bed Alarm;Multiple lines;O2;Fall Risk  (2L O2)   Home Living   Type of Home Mobile home   Home Layout One level;Stairs to enter with rails  (3 VI)   Bathroom Shower/Tub Tub/shower unit   Bathroom Equipment Shower chair   Home Equipment Sock aid; Wheelchair-manual;Walker;Cane;Hemiwalker  (2L of O2 at night  pt reports using a sock aid prior to moving in with her daughter )   Prior Function   Level of [place] Independent with ADLs; Independent with functional mobility   Lives With Other (Comment)  (lives with her niece Arin Toussaint and 3 great-nieces)   Receives Help From Family   IADLs Family/Friend/Other provides transportation   Falls in the last 6 months 1 to 4   Vocational Retired   Lifestyle   Autonomy PTA pt living in [facility] with niece, (A) with ADLs and IADLs, (+) falls, (+) driving   Reciprocal Relationships supportive niece   Service to Others retired, [employer] psychiatric nurse   Intrinsic Gratification used to like to go bowling but has not be able to recently due to past shoulder injury   pt enjoys spending time with her great nieces   Subjective   Subjective "it feels so good to be able to stand up"   ADL   Eating Assistance 7  Independent   Grooming Assistance 2  Maximal Assistance   Grooming Deficit Brushing hair; Increased time to complete  (pt reports pain when brushing hair in chair due to fall)   UB Bathing Assistance 5  Supervision/Setup   LB Bathing Assistance 2  Maximal Assistance   UB Dressing Assistance 4  Minimal Assistance   LB Dressing Assistance 1  Total Assistance   LB Dressing Deficit Don/doff R sock; Don/doff L sock  (don socks at EOB  pt reports using a sock aid previously )   Toileting Assistance  2  Maximal Assistance   Toileting Deficit Clothing management up;Clothing management down;Perineal hygiene; Increased time to complete  (completed at [facility]  pt able to wipe front perineal area but needed assistance with clothing management, and back perineal area )   Bed Mobility   Supine to Sit 4  Minimal assistance   Additional items Assist x 1; Increased time required;Verbal cues   Transfers   Sit to Stand 4  Minimal assistance   Additional items Assist x 1   Stand to Sit 4  Minimal assistance   Additional items Assist x 1   Toilet transfer 4  Minimal assistance   Additional items Assist x 1   Functional Mobility   Functional Mobility 4  Minimal assistance   Additional Comments assist x1   Additional items Rolling walker   Balance   Static Sitting Fair   Dynamic Standing Fair -   Activity Tolerance   Activity Tolerance Patient limited by fatigue   Medical Staff Made Aware ANDREINA Mckeon  PT Zully   RUE Assessment   RUE Assessment WFL   LUE Assessment   LUE Assessment WFL   Vision-Basic Assessment   Current Vision Wears glasses only for reading   Cognition   Overall Cognitive Status [facility]   Arousal/Participation Alert; Cooperative   Attention Within functional limits   Orientation Level Oriented X4   Memory Decreased recall of recent events   Following Commands Follows one step commands with increased time or repetition Comments pt ID via name and   pt responded to questions appropriately  at end of session, pt was in bed side chair with all needs within reach, bed/chair alarm activated and call bell within reach  Assessment   Limitation Decreased ADL status; Decreased Safe judgement during ADL;Decreased endurance;Decreased self-care trans;Decreased high-level ADLs   Prognosis Good   Assessment Pt is a 81 y o  female seen for OT evaluation s/p admission to [address] [place] [address] on 2023 due to syncopal episode resulting in fall  Pt diagnosed with Pneumonia  Pt has a significant PMH impacting occupational performance including: COVID, type II Diabetes, CHF, COPD, and hypertension  Pt with 1 recent admissions in the last 2 months  PTA pt living in [facility] with niece, (A) with ADLs and IADLs, (+) falls, (+) driving  Pt is motivated to return to home  Personal and environmental factors supporting pt at time of IE include social support and accessible home environment  Personal and environmental factors inhibiting engagement in occupations include difficulty completing ADLs and difficulty completing IADLs  During evaluation pt performed as is outlined above in flowsheet  Pt required VC for safety and seated rest breaks  Standardized assessments used to assist in identifying performance deficits include AMPAC 6-Clicks and Barthel ADL Index  Performance deficits that affect the pt’s occupational performance during the initial evaluation include impaired balance, functional mobility, endurance, activity tolerance, forward functional reach, functional standing tolerance and overall strength, safety awareness  Based on pt’s functional performance and deficits the following occupations will be addressed in OT treatments in order to maximize pt’s independence and overall occupational performance:  Goals are listed below  Upon discharge from acute care setting recommend d/c to [address] [place] [address]     Goals   Patient Goals to get out of bed   LTG Time Frame 10-14   Long Term Goal see goals listed below   Plan   Treatment Interventions ADL retraining;Functional transfer training;Patient/family training;Equipment evaluation/education;Continued evaluation; Energy conservation; Activityengagement; Endurance training   Goal Expiration Date 02/08/23   OT Treatment Day 0   OT Frequency 3-5x/wk   Recommendation   OT Discharge Recommendation Post acute rehabilitation services   AM-PAC Daily Activity Inpatient   Lower Body Dressing 1   Bathing 2   Toileting 2   Upper Body Dressing 3   Grooming 3   Eating 3   Daily Activity Raw Score 14   Daily Activity Standardized Score (Calc for Raw Score >=11) 33 39   AM-PAC Applied Cognition Inpatient   Following a Speech/Presentation 3   Understanding Ordinary Conversation 3   Taking Medications 3   Remembering Where Things Are Placed or Put Away 2   Remembering List of 4-5 Errands 2   Taking Care of Complicated Tasks 2   Applied Cognition Raw Score 15   Applied Cognition Standardized Score 33 54   Barthel Index   Feeding 10   Bathing 0   Grooming Score 0   Dressing Score 5   Bladder Score 0   Bowels Score 5   Toilet Use Score 5   Transfers (Bed/Chair) Score 10   Mobility (Level Surface) Score 0   Stairs Score 0   Barthel Index Score 35   End of Consult   Patient Position at End of Consult Bedside chair;Bed/Chair alarm activated; All needs within reach     Vitals:    01/25/23 1417 01/25/23 1503 01/25/23 1506 01/25/23 1517   BP: 165/83 132/56 143/66 120/72   Pulse: 83 84 95 92   Patient Position - Orthostatic VS:  Lying - Orthostatic VS Sitting - Orthostatic VS Standing for 3 minutes - Orthostatic VS      Pt will perform bed mobility from supine to EOB with (S) to participate in activities with her grand-nieces  Pt will perform LB dressing with mod (I) and use of long-handled equipment to improve participation in ADLs  Pt will perform UB dressing with mod (I) to improve participation in morning routines       Pt will perform UB bathing with Mod (I) to improve participation in ADLs  Pt will perform LB bathing with min (A) or use of long-handled sponge to improve participation in morning routines  Pt will performing grooming tasks with (Mod) I to improve participation in ADLs  Pt will perform tolieting tasks with min (A) to improve participation in daily routine  Pt will complete functional transfers from bed<>BSC with (S) to improve participation in tolieting  Pt will transfer from EOB<> bedside chair with (S) for all 3 meals to improve functional sitting and activity tolerance and participation in every day routines  Pt will complete functional mobility household distances from EOB<>toliet with (S) to improve participation in everyday routines  The patient's raw score on the AM-PAC Daily Activity inpatient short form is 14, standardized score is 33 39, less than 39 4  Patients at this level are likely to benefit from discharge to post-acute rehabilitation services  Please refer to the recommendation of the Occupational Therapist for safe discharge planning  This session, pt required and most appropriately benefited from skilled OT/PT co-eval due to extensive physical assistance of SKILLED therapists and decreased activity tolerance  OT and PT goals were addressed separately as seen in documentation       Sergio Martins, OTS

## 2023-01-25 NOTE — ASSESSMENT & PLAN NOTE
Patient presents after syncopal episode while being on the toilet  Past medical history significant for COPD, CHF, obstructive sleep apnea with poor CPAP compliance  Granddaughter found patient unconscious on the floor  O2 sat at that time was reportedly 88%  Patient reports not feeling well for the past several days complaining of productive cough with green sputum  Patient denies fever, chills or sweats  No chest pain or complaints of being unusually short of breath reported  She is found to be tachycardic on arrival to the emergency department, patient does admit to not taking her metoprolol this morning  Work-up in the emergency department significant for leukocytosis and CT concerning for multifocal right sided pneumonia  Given 500 cc bolus and one-time dose of Rocephin in the emergency department  Physical exam mostly noncontributory due to large body habitus        Sputum cx - gram positive rods 3+, gram positive cocci in pairs 3+, gram negative rods 4+   Blood cx - [id number]     Procal downtrending   Lab Results   Component Value Date    PROCALCITONI 0 83 (H) 01/24/2023    PROCALCITONI 1 28 (H) 01/23/2023    PROCALCITONI 0 13 01/22/2023     WBC downtrending   Lab Results   Component Value Date    WBC 9 32 01/25/2023    WBC 11 07 (H) 01/24/2023    WBC 15 36 (H) 01/23/2023    WBC 15 76 (H) 01/22/2023         PLAN  -Continue IV Ceftriaxone 2g daily (first dose 1/22) through day 4 (1/25) then transition to PO tomorrow (1/26) to complete a 5 day course   -Mucinex 1200 q12 hours  -Marquita gudino PRN for cough   -Trend CBC  -Incentive spirometry and flutter valve  -Continue supplementary oxygen to maintain O2 sat between 88-92%

## 2023-01-25 NOTE — ASSESSMENT & PLAN NOTE
Maintained on Toprol-XL 50 mg daily and Eliquis 5 mg twice daily  Patient is presenting for syncopal episode  EKG on this admission shows sinus tachycardia with heart rate of 113, patient noted to be septic on this admission and she admits to not taking her metoprolol this morning      PLAN  -Continue telemetry to monitor rates closely  -Home metoprolol held due to incidental hypotension on 1/23/2023 and NATALIA but restarted metoprolol 50mg daily 1/24  -Monitor BMP  -Mag >2, k>4

## 2023-01-26 ENCOUNTER — RA CDI HCC (OUTPATIENT)
Dept: OTHER | Facility: HOSPITAL | Age: 73
End: 2023-01-26

## 2023-01-26 DIAGNOSIS — I50.9 CHF (CONGESTIVE HEART FAILURE) (HCC): ICD-10-CM

## 2023-01-26 DIAGNOSIS — E11.9 DIABETES MELLITUS TYPE 2 IN NONOBESE (HCC): ICD-10-CM

## 2023-01-26 DIAGNOSIS — I48.0 PAROXYSMAL ATRIAL FIBRILLATION (HCC): ICD-10-CM

## 2023-01-26 PROBLEM — R04.0 EPISTAXIS: Status: ACTIVE | Noted: 2023-01-26

## 2023-01-26 LAB
ANION GAP SERPL CALCULATED.3IONS-SCNC: 7 MMOL/L (ref 4–13)
ANION GAP SERPL CALCULATED.3IONS-SCNC: 7 MMOL/L (ref 4–13)
BACTERIA SPT RESP CULT: ABNORMAL
BASOPHILS # BLD AUTO: 0.09 THOUSANDS/ÂΜL (ref 0–0.1)
BASOPHILS # BLD AUTO: 0.09 THOUSANDS/ÂΜL (ref 0–0.1)
BASOPHILS NFR BLD AUTO: 1 % (ref 0–1)
BASOPHILS NFR BLD AUTO: 1 % (ref 0–1)
BUN SERPL-MCNC: 37 MG/DL (ref 5–25)
BUN SERPL-MCNC: 37 MG/DL (ref 5–25)
CALCIUM SERPL-MCNC: 8.5 MG/DL (ref 8.4–10.2)
CALCIUM SERPL-MCNC: 8.5 MG/DL (ref 8.4–10.2)
CHLORIDE SERPL-SCNC: 101 MMOL/L (ref 96–108)
CHLORIDE SERPL-SCNC: 101 MMOL/L (ref 96–108)
CO2 SERPL-SCNC: 31 MMOL/L (ref 21–32)
CO2 SERPL-SCNC: 31 MMOL/L (ref 21–32)
CREAT SERPL-MCNC: 1.39 MG/DL (ref 0.6–1.3)
CREAT SERPL-MCNC: 1.39 MG/DL (ref 0.6–1.3)
EOSINOPHIL # BLD AUTO: 0.61 THOUSAND/ÂΜL (ref 0–0.61)
EOSINOPHIL # BLD AUTO: 0.61 THOUSAND/ÂΜL (ref 0–0.61)
EOSINOPHIL NFR BLD AUTO: 6 % (ref 0–6)
EOSINOPHIL NFR BLD AUTO: 6 % (ref 0–6)
ERYTHROCYTE [DISTWIDTH] IN BLOOD BY AUTOMATED COUNT: 13.6 % (ref 11.6–15.1)
ERYTHROCYTE [DISTWIDTH] IN BLOOD BY AUTOMATED COUNT: 13.6 % (ref 11.6–15.1)
GFR SERPL CREATININE-BSD FRML MDRD: 38 ML/MIN/1.73SQ M
GFR SERPL CREATININE-BSD FRML MDRD: 38 ML/MIN/1.73SQ M
GLUCOSE SERPL-MCNC: 108 MG/DL (ref 65–140)
GLUCOSE SERPL-MCNC: 108 MG/DL (ref 65–140)
GLUCOSE SERPL-MCNC: 173 MG/DL (ref 65–140)
GLUCOSE SERPL-MCNC: 173 MG/DL (ref 65–140)
GLUCOSE SERPL-MCNC: 339 MG/DL (ref 65–140)
GLUCOSE SERPL-MCNC: 339 MG/DL (ref 65–140)
GLUCOSE SERPL-MCNC: 341 MG/DL (ref 65–140)
GLUCOSE SERPL-MCNC: 341 MG/DL (ref 65–140)
GLUCOSE SERPL-MCNC: 71 MG/DL (ref 65–140)
GLUCOSE SERPL-MCNC: 71 MG/DL (ref 65–140)
GLUCOSE SERPL-MCNC: 78 MG/DL (ref 65–140)
GLUCOSE SERPL-MCNC: 78 MG/DL (ref 65–140)
GRAM STN SPEC: ABNORMAL
HCT VFR BLD AUTO: 34.9 % (ref 34.8–46.1)
HCT VFR BLD AUTO: 34.9 % (ref 34.8–46.1)
HGB BLD-MCNC: 10.6 G/DL (ref 11.5–15.4)
HGB BLD-MCNC: 10.6 G/DL (ref 11.5–15.4)
IMM GRANULOCYTES # BLD AUTO: 0.07 THOUSAND/UL (ref 0–0.2)
IMM GRANULOCYTES # BLD AUTO: 0.07 THOUSAND/UL (ref 0–0.2)
IMM GRANULOCYTES NFR BLD AUTO: 1 % (ref 0–2)
IMM GRANULOCYTES NFR BLD AUTO: 1 % (ref 0–2)
INR PPP: 1.59 (ref 0.84–1.19)
INR PPP: 1.59 (ref 0.84–1.19)
LYMPHOCYTES # BLD AUTO: 1.61 THOUSANDS/ÂΜL (ref 0.6–4.47)
LYMPHOCYTES # BLD AUTO: 1.61 THOUSANDS/ÂΜL (ref 0.6–4.47)
LYMPHOCYTES NFR BLD AUTO: 17 % (ref 14–44)
LYMPHOCYTES NFR BLD AUTO: 17 % (ref 14–44)
MCH RBC QN AUTO: 29.3 PG (ref 26.8–34.3)
MCH RBC QN AUTO: 29.3 PG (ref 26.8–34.3)
MCHC RBC AUTO-ENTMCNC: 30.4 G/DL (ref 31.4–37.4)
MCHC RBC AUTO-ENTMCNC: 30.4 G/DL (ref 31.4–37.4)
MCV RBC AUTO: 96 FL (ref 82–98)
MCV RBC AUTO: 96 FL (ref 82–98)
MONOCYTES # BLD AUTO: 1.23 THOUSAND/ÂΜL (ref 0.17–1.22)
MONOCYTES # BLD AUTO: 1.23 THOUSAND/ÂΜL (ref 0.17–1.22)
MONOCYTES NFR BLD AUTO: 13 % (ref 4–12)
MONOCYTES NFR BLD AUTO: 13 % (ref 4–12)
NEUTROPHILS # BLD AUTO: 5.99 THOUSANDS/ÂΜL (ref 1.85–7.62)
NEUTROPHILS # BLD AUTO: 5.99 THOUSANDS/ÂΜL (ref 1.85–7.62)
NEUTS SEG NFR BLD AUTO: 62 % (ref 43–75)
NEUTS SEG NFR BLD AUTO: 62 % (ref 43–75)
NRBC BLD AUTO-RTO: 0 /100 WBCS
NRBC BLD AUTO-RTO: 0 /100 WBCS
PLATELET # BLD AUTO: 171 THOUSANDS/UL (ref 149–390)
PLATELET # BLD AUTO: 171 THOUSANDS/UL (ref 149–390)
PMV BLD AUTO: 10.4 FL (ref 8.9–12.7)
PMV BLD AUTO: 10.4 FL (ref 8.9–12.7)
POTASSIUM SERPL-SCNC: 4.7 MMOL/L (ref 3.5–5.3)
POTASSIUM SERPL-SCNC: 4.7 MMOL/L (ref 3.5–5.3)
PROTHROMBIN TIME: 19.2 SECONDS (ref 11.6–14.5)
PROTHROMBIN TIME: 19.2 SECONDS (ref 11.6–14.5)
RBC # BLD AUTO: 3.62 MILLION/UL (ref 3.81–5.12)
RBC # BLD AUTO: 3.62 MILLION/UL (ref 3.81–5.12)
SODIUM SERPL-SCNC: 139 MMOL/L (ref 135–147)
SODIUM SERPL-SCNC: 139 MMOL/L (ref 135–147)
WBC # BLD AUTO: 9.6 THOUSAND/UL (ref 4.31–10.16)
WBC # BLD AUTO: 9.6 THOUSAND/UL (ref 4.31–10.16)

## 2023-01-26 RX ORDER — SODIUM CHLORIDE/ALOE VERA
1 GEL (GRAM) NASAL
Status: DISPENSED | OUTPATIENT
Start: 2023-01-26 | End: 2023-01-26

## 2023-01-26 RX ORDER — ECHINACEA PURPUREA EXTRACT 125 MG
1 TABLET ORAL
Status: DISCONTINUED | OUTPATIENT
Start: 2023-01-26 | End: 2023-01-26

## 2023-01-26 RX ORDER — INSULIN GLARGINE 100 [IU]/ML
25 INJECTION, SOLUTION SUBCUTANEOUS EVERY 12 HOURS SCHEDULED
Status: DISCONTINUED | OUTPATIENT
Start: 2023-01-26 | End: 2023-01-27 | Stop reason: HOSPADM

## 2023-01-26 RX ORDER — SODIUM CHLORIDE/ALOE VERA
1 GEL (GRAM) NASAL
Status: DISCONTINUED | OUTPATIENT
Start: 2023-01-26 | End: 2023-01-26

## 2023-01-26 RX ORDER — ECHINACEA PURPUREA EXTRACT 125 MG
1 TABLET ORAL
Status: DISCONTINUED | OUTPATIENT
Start: 2023-01-26 | End: 2023-01-27 | Stop reason: HOSPADM

## 2023-01-26 RX ORDER — APIXABAN 5 MG/1
TABLET, FILM COATED ORAL
Qty: 180 TABLET | Refills: 1 | Status: SHIPPED | OUTPATIENT
Start: 2023-01-26

## 2023-01-26 RX ORDER — OXYMETAZOLINE HYDROCHLORIDE 0.05 G/100ML
2 SPRAY NASAL EVERY 12 HOURS SCHEDULED
Status: DISCONTINUED | OUTPATIENT
Start: 2023-01-26 | End: 2023-01-27 | Stop reason: HOSPADM

## 2023-01-26 RX ADMIN — GUAIFENESIN 1200 MG: 600 TABLET ORAL at 08:16

## 2023-01-26 RX ADMIN — SALINE NASAL SPRAY 1 SPRAY: 1.5 SOLUTION NASAL at 11:45

## 2023-01-26 RX ADMIN — PRAVASTATIN SODIUM 80 MG: 80 TABLET ORAL at 17:58

## 2023-01-26 RX ADMIN — CEFTRIAXONE SODIUM 2000 MG: 10 INJECTION, POWDER, FOR SOLUTION INTRAVENOUS at 10:23

## 2023-01-26 RX ADMIN — OXYMETHAZOLINE HCL 2 SPRAY: 0.05 SPRAY NASAL at 09:53

## 2023-01-26 RX ADMIN — TORSEMIDE 40 MG: 20 TABLET ORAL at 08:16

## 2023-01-26 RX ADMIN — OXYMETHAZOLINE HCL 2 SPRAY: 0.05 SPRAY NASAL at 21:55

## 2023-01-26 RX ADMIN — SALINE NASAL SPRAY 1 SPRAY: 1.5 SOLUTION NASAL at 21:55

## 2023-01-26 RX ADMIN — SALINE NASAL SPRAY 1 SPRAY: 1.5 SOLUTION NASAL at 17:59

## 2023-01-26 RX ADMIN — INSULIN GLARGINE 25 UNITS: 100 INJECTION, SOLUTION SUBCUTANEOUS at 21:55

## 2023-01-26 RX ADMIN — ONDANSETRON 4 MG: 4 TABLET, ORALLY DISINTEGRATING ORAL at 17:29

## 2023-01-26 RX ADMIN — MONTELUKAST 10 MG: 10 TABLET, FILM COATED ORAL at 21:56

## 2023-01-26 RX ADMIN — MECLIZINE HYDROCHLORIDE 25 MG: 25 TABLET ORAL at 21:55

## 2023-01-26 RX ADMIN — INSULIN LISPRO 8 UNITS: 100 INJECTION, SOLUTION INTRAVENOUS; SUBCUTANEOUS at 17:58

## 2023-01-26 RX ADMIN — PREGABALIN 100 MG: 100 CAPSULE ORAL at 08:16

## 2023-01-26 RX ADMIN — PREGABALIN 100 MG: 100 CAPSULE ORAL at 17:57

## 2023-01-26 RX ADMIN — MECLIZINE HYDROCHLORIDE 25 MG: 25 TABLET ORAL at 13:52

## 2023-01-26 RX ADMIN — SALINE NASAL SPRAY 1 SPRAY: 1.5 SOLUTION NASAL at 20:30

## 2023-01-26 RX ADMIN — SALINE NASAL SPRAY 1 SPRAY: 1.5 SOLUTION NASAL at 13:52

## 2023-01-26 RX ADMIN — GUAIFENESIN 1200 MG: 600 TABLET ORAL at 21:55

## 2023-01-26 RX ADMIN — APIXABAN 5 MG: 5 TABLET, FILM COATED ORAL at 17:58

## 2023-01-26 RX ADMIN — INSULIN LISPRO 8 UNITS: 100 INJECTION, SOLUTION INTRAVENOUS; SUBCUTANEOUS at 21:56

## 2023-01-26 RX ADMIN — APIXABAN 5 MG: 5 TABLET, FILM COATED ORAL at 08:16

## 2023-01-26 RX ADMIN — MECLIZINE HYDROCHLORIDE 25 MG: 25 TABLET ORAL at 05:13

## 2023-01-26 RX ADMIN — INSULIN LISPRO 2 UNITS: 100 INJECTION, SOLUTION INTRAVENOUS; SUBCUTANEOUS at 11:46

## 2023-01-26 RX ADMIN — METOPROLOL SUCCINATE 50 MG: 50 TABLET, EXTENDED RELEASE ORAL at 08:16

## 2023-01-26 NOTE — ASSESSMENT & PLAN NOTE
Lab Results   Component Value Date    HGBA1C 11 0 (H) 01/23/2023       Recent Labs     01/25/23  1610 01/25/23 2055 01/26/23  0707 01/26/23  0752   POCGLU 220* 236* 71 108       Blood Sugar Average: Last 72 hrs:  (P) 162  Blood sugar appears to be poorly controlled on this admission  Patient is maintained on Toujeo 25 units before breakfast and 25 units before bedtime along with mealtime Humalog    Patient admits to only checking her blood sugars and taking mealtime insulin at breakfast     PLAN:  -HBA1C- 11%  -Continue home Toujeo 35 units daily  -Sliding scale insulin ordered for mealtime insulin  -Hypoglycemia protocol  -Diabetic/cardiac diet ordered  -Reinforced importance of blood glucose monitoring

## 2023-01-26 NOTE — PROGRESS NOTES
[facility]  Progress Note Lisa Wynn 1950, 81 y o  female MRN: [id number]  Unit/Bed#: S -01 Encounter: [id number]  Primary Care Provider: Jami Pruett MD   Date and time admitted to hospital: 1/22/2023  9:17 AM    Epistaxis  Assessment & Plan  Has occasional nose bleeds at home due to [facility]  1/26 is having a prolonged nose bleed with large clot despite compression and nasal spray/gel  PLAN  -Humidifier for NC O2 supplement   -Nasal spray/gel scheduled q4h  -afrin ocean spray  -If doesn't improve, consider consulting ENT for nasal packing   -F/u PT-INR     Morbid obesity with BMI of 50 0-59 9, [facility])  Assessment & Plan  PLAN  Follow up with PCP for weight loss management at [place]    Open wound  Assessment & Plan  Patient has history of open wound on right heel for which she follows up with wound care as outpatient  Family seems to be very supportive and helping keep the wound clean  Past medical history is significant for type 2 diabetes  Neurovascular exam of both feet appears to be intact  Wound on right heel is exposed but does not appear to be purulent or infected  Podiatry recs: Ulcer appears clinically stable and noninfected  Right heel dressed with Maxorb Ag and Allevyn foam pad  Can change dressing every other day with wound care  No podiatric interventions warranted during this admission    PLAN  -Podiatry consulted, reecommendations appreciated as below  -Consult wound care for dressing change every other day     Obstructive sleep apnea  Assessment & Plan  Patient has past medical history obstructive sleep apnea for which she is supposed to be using a CPAP at night  Patient reports poor compliance with CPAP due to discomfort with the mask  CPAP ordered for this admission       PLAN  -Needs referral to sleep medicine on discharge to discuss difficulties with compliance  -Continue home 2L NC overnight during admission   -Add humidifier to [facility] to avoid nasal dryness and epistaxis         Hypertension  Assessment & Plan  Home meds include Toprol-XL 50 mg and torsemide 40 mg daily  Hold above meds for now- 1/23/2023  metoprolol 50mg daily restarted 1/24     PLAN   -Continue home metoprolol 50mg daily and Torsemide 40mg daily       CKD (chronic kidney disease)  Assessment & Plan  Lab Results   Component Value Date    EGFR 38 01/26/2023    EGFR 36 01/25/2023    EGFR 25 01/24/2023    CREATININE 1 39 (H) 01/26/2023    CREATININE 1 45 (H) 01/25/2023    CREATININE 1 97 (H) 01/24/2023   Baseline creatinine seems to range between 1 5-1 8  PLAN  -Trend BMP for worsening creatinine  -Avoid hypotension and nephrotoxic agents  -Monitor for changes in urinary output  -Track I/O's     Atrial fibrillation (HCC)  Assessment & Plan  Maintained on Toprol-XL 50 mg daily and Eliquis 5 mg twice daily  Patient is presenting for syncopal episode  EKG on this admission shows sinus tachycardia with heart rate of 113, patient noted to be septic on this admission and she admits to not taking her metoprolol this morning  PLAN  -Continue telemetry to monitor rates closely  -Home metoprolol held due to incidental hypotension on 1/23/2023 and NATALIA but restarted metoprolol 50mg daily 1/24  -Monitor BMP  -Mag >2, k>4        CHF (congestive heart failure) ([facility])  Assessment & Plan  Wt Readings from Last 3 Encounters:   01/26/23 114 kg (251 lb 15 8 oz)     Maintained on torsemide 40 mg daily  Stress test in August 2022 shows an ejection fraction of 70%  Patient appears to be euvolemic on this admission  Continue home medications  Monitor BMP  Magnesium level ordered  Cardiac diet ordered      PLAN  -Held home torsemide in the setting of NATALIA - restarted home Torsemide 40mg daily (1/25)  -Assess volume status daily    Diabetes [facility])  Assessment & Plan  Lab Results   Component Value Date    HGBA1C 11 0 (H) 01/23/2023       Recent Labs     01/25/23  1610 01/25/23 2055 01/26/23  7203 01/26/23  0752   POCGLU 220* 236* 71 108       Blood Sugar Average: Last 72 hrs:  (P) 162  Blood sugar appears to be poorly controlled on this admission  Patient is maintained on Toujeo 25 units before breakfast and 25 units before bedtime along with mealtime Humalog  Patient admits to only checking her blood sugars and taking mealtime insulin at breakfast     PLAN:  -HBA1C- 11%  -Continue home Toujeo 35 units daily  -Sliding scale insulin ordered for mealtime insulin  -Hypoglycemia protocol  -Diabetic/cardiac diet ordered  -Reinforced importance of blood glucose monitoring    COPD (chronic obstructive pulmonary disease) ([facility])  Assessment & Plan  Maintained on albuterol every 6 hours as needed, daily fluticasone, Singulair nightly  Patient uses 2 L nasal cannula oxygen at nighttime  PLAN:  -Continue home maintenance medications- singulair  -Prn xopenex on 1/23/2023  -Continue supplementary oxygen to maintain O2 sats between 88 and 92% and wean as tolerated     Syncope  Assessment & Plan  Patient presents after syncopal episode while sitting on the toilet  Past medical history significant for atrial fibrillation maintained on metoprolol and Eliquis, CHF on torsemide 40 mg daily, and type 2 diabetes  Patient says she went to the bathroom earlier this morning and was found on the bathroom floor by her granddaughter  Patient does not recall prodromal symptoms prior to passing out but says she may have been trying to stand up  She denies straining just before losing consciousness  Granddaughter reports O2 saturation at the time of finding the patient was approximately 88%  Patient found to be tachycardic and hypertensive on POA but she did not take her metoprolol this morning  EKG shows sinus tachycardia  Troponins are negative  Stress test from August 2022 shows an EF 70%    As of now there is an unclear etiology of why the patient lost consciousness, however tachyarrhythmia and orthostatic hypotension are certainly possibilities  She will need further work-up and observation  Ddx: orthostatic hypotension vs  low BP in s/o PNA infx vs  tachyarrhythmia     PLAN:  -Orthostatic vitals completed 1/25 - positive but cuff timed out x2 on standing so may not be reliable, assx so do not need to repeat   -Continue Telemetry  -Trend BMP  -Fall precautions ordered      Sepsis [facility])  Assessment & Plan  Secondary to community-acquired pneumonia  Patient presents with leukocytosis, tachycardia and suspected source of right sided multifocal pneumonia  Please refer to A&P under community-acquired pneumonia    Sputum cx (1/22): gram + rods 3+, gram positive cocci in pairs 3+, gram negative rods 4+     Lab Results   Component Value Date    WBC 9 60 01/26/2023    WBC 9 32 01/25/2023    WBC 11 07 (H) 01/24/2023    WBC 15 36 (H) 01/23/2023    WBC 15 76 (H) 01/22/2023    PROCALCITONI 0 83 (H) 01/24/2023    PROCALCITONI 1 28 (H) 01/23/2023    PROCALCITONI 0 13 01/22/2023     Lab Results   Component Value Date    BLOODCX No Growth at 72 hrs  01/22/2023    BLOODCX No Growth at 72 hrs  01/22/2023       PLAN  -Fluids normal saline 100 cc/h - d/c'ed 1/24  -Follow-up blood cultures   -Monitor temperature curve, CBC  -Follow-up blood cultures   -Monitor temperature curve, CBC  -IV Ceftriaxone 2g daily - 5 day course completed (1/22-1/26)  -Urine strep and legionella - needs to be collected         Tompa U  66  following syncopal episode at home, home pulse ox 88%  CT head and trauma series negative    PLAN  -Fall precautions   Tylenol 650 q6h PRN for mild pain   -PT/OT - rec post-acute rehab  -Orthostatic vitals completed 1/25 - positive but cuff timed out x2 on standing so may not be reliable, assx so do not need to repeat     * Pneumonia  Assessment & Plan  Patient presents after syncopal episode while being on the toilet    Past medical history significant for COPD, CHF, obstructive sleep apnea with poor CPAP compliance  Granddaughter found patient unconscious on the floor  O2 sat at that time was reportedly 88%  Patient reports not feeling well for the past several days complaining of productive cough with green sputum  Patient denies fever, chills or sweats  No chest pain or complaints of being unusually short of breath reported  She is found to be tachycardic on arrival to the emergency department, patient does admit to not taking her metoprolol this morning  Work-up in the emergency department significant for leukocytosis and CT concerning for multifocal right sided pneumonia  Given 500 cc bolus and one-time dose of Rocephin in the emergency department  Physical exam mostly noncontributory due to large body habitus  Sputum cx - gram positive rods 3+, gram positive cocci in pairs 3+, gram negative rods 4+   Blood cx - [id number]     Procal downtrending   Lab Results   Component Value Date    PROCALCITONI 0 83 (H) 01/24/2023    PROCALCITONI 1 28 (H) 01/23/2023    PROCALCITONI 0 13 01/22/2023     WBC downtrending   Lab Results   Component Value Date    WBC 9 60 01/26/2023    WBC 9 32 01/25/2023    WBC 11 07 (H) 01/24/2023    WBC 15 36 (H) 01/23/2023    WBC 15 76 (H) 01/22/2023         PLAN  -IV Ceftriaxone 2g daily - 5 day course completed (1/22-1/26)  -Mucinex 1200 q12 hours  -Tessalon perles PRN for cough   -Trend CBC  -Incentive spirometry and flutter valve  -Continue supplementary oxygen to maintain O2 sat between 88-92%  -Outpatient CXR 4-6 weeks after discharge            VTE Pharmacologic Prophylaxis: VTE Score: 9 High Risk (Score >/= 5) - Pharmacological DVT Prophylaxis Ordered: apixaban (Eliquis)  Sequential Compression Devices Ordered  Patient Centered Rounds: I performed bedside rounds with nursing staff today  Discussions with Specialists or Other Care Team Provider:     Education and Discussions with Family / Patient: Updated  (sister) via phone      Time Spent for Care: 30 minutes  More than 50% of total time spent on counseling and coordination of care as described above  Current Length of Stay: 4 day(s)  Current Patient Status: Inpatient   Certification Statement: The patient will continue to require additional inpatient hospital stay due to placement referral  Discharge Plan: Anticipate discharge tomorrow to rehab facility  Code Status: Level 1 - Full Code    Subjective:   Ms Jeramy Garza is a 81 y o  F with a PMH of HTN, T2DM (on lantus 35U at night), COPD, OSMANY (2L NC overnight, doesn't tolerate CPAP), CHF, atrial fibrillation (on Eliquis 5mg and metoprolol 50mg), and CKD (BL Cr 1 5-1 8) who presents after losing consciousness on the toilet the morning of admission and several days of productive cough  No acute events overnight  This morning, patient reports that her productive cough is unchanged, her headache has significantly improved (didn't require tylenol PRN yesterday), and she hasn't had any dizziness/lightheadedness since yesterday  During the interview, patient was having a prolonged nose bleed  Objective:     Vitals:   Temp (24hrs), Av 7 °F (37 1 °C), Min:98 4 °F (36 9 °C), Max:99 3 °F (37 4 °C)    Temp:  [98 4 °F (36 9 °C)-99 3 °F (37 4 °C)] 98 4 °F (36 9 °C)  HR:  [72-95] 77  Resp:  [18] 18  BP: (120-165)/(56-83) 149/64  SpO2:  [84 %-98 %] 94 %  Body mass index is 46 09 kg/m²  Input and Output Summary (last 24 hours): Intake/Output Summary (Last 24 hours) at 2023 1107  Last data filed at 2023 1417  Gross per 24 hour   Intake --   Output 800 ml   Net -800 ml       Physical Exam:   Physical Exam  Constitutional:       General: She is not in acute distress  Appearance: She is obese  She is not ill-appearing, toxic-appearing or diaphoretic  HENT:      Nose:      Comments: Epistaxis   Cardiovascular:      Rate and Rhythm: Normal rate and regular rhythm  Pulses: Normal pulses  Heart sounds: Normal heart sounds  No murmur heard      No friction rub  No gallop  Pulmonary:      Effort: Pulmonary effort is normal  No respiratory distress  Breath sounds: Normal breath sounds  Abdominal:      General: Abdomen is flat  Bowel sounds are normal       Palpations: Abdomen is soft  Skin:     Findings: No bruising  Comments: Right orbital region, mildly improved from yesterday    Neurological:      Mental Status: She is alert  Additional Data:     Labs:  Results from last 7 days   Lab Units 01/26/23  0643   WBC Thousand/uL 9 60   HEMOGLOBIN g/dL 10 6*   HEMATOCRIT % 34 9   PLATELETS Thousands/uL 171   NEUTROS PCT % 62   LYMPHS PCT % 17   MONOS PCT % 13*   EOS PCT % 6     Results from last 7 days   Lab Units 01/26/23  0643 01/25/23  0546   SODIUM mmol/L 139 140   POTASSIUM mmol/L 4 7 4 7   CHLORIDE mmol/L 101 102   CO2 mmol/L 31 34*   BUN mg/dL 37* 34*   CREATININE mg/dL 1 39* 1 45*   ANION GAP mmol/L 7 4   CALCIUM mg/dL 8 5 8 4   ALBUMIN g/dL  --  3 1*   TOTAL BILIRUBIN mg/dL  --  0 26   ALK PHOS U/L  --  79   ALT U/L  --  17   AST U/L  --  27   GLUCOSE RANDOM mg/dL 78 107         Results from last 7 days   Lab Units 01/26/23  0752 01/26/23  0707 01/25/23  2055 01/25/23  1610 01/25/23  1058 01/25/23  0734 01/25/23  0539 01/24/23  2303 01/24/23  2053 01/24/23  1717 01/24/23  1021 01/24/23  0710   POC GLUCOSE mg/dl 108 71 236* 220* 125 110 111 228* 216* 161* 191* 99     Results from last 7 days   Lab Units 01/23/23  1722   HEMOGLOBIN A1C % 11 0*     Results from last 7 days   Lab Units 01/24/23  0725 01/23/23  0404 01/22/23  1042   LACTIC ACID mmol/L  --   --  1 1   PROCALCITONIN ng/ml 0 83* 1 28* 0 13       Lines/Drains:  Invasive Devices     Peripheral Intravenous Line  Duration           Peripheral IV 01/22/23 Right;Upper Arm 4 days                      Imaging: No pertinent imaging reviewed      Recent Cultures (last 7 days):   Results from last 7 days   Lab Units 01/23/23  1431 01/22/23  1042   BLOOD CULTURE   --  No Growth at 72 hrs   No Growth at 72 hrs  SPUTUM CULTURE  2+ Growth of  Commensal respiratory renee only; No significant growth of Staph aureus/MRSA or Pseudomonas aeruginosa    --    GRAM STAIN RESULT  1+ Epithelial cells per low power field*  3+ Polys*  4+ Gram negative rods*  3+ Gram positive cocci in pairs*  3+ Gram positive rods*  --        Last 24 Hours Medication List:   Current Facility-Administered Medications   Medication Dose Route Frequency Provider Last Rate   • acetaminophen  650 mg Oral Q6H PRN Charleen Kay MD     • apixaban  5 mg Oral BID Kesha Falcon MD     • benzonatate  200 mg Oral TID PRN Kesha Falcon MD     • cefTRIAXone  2,000 mg Intravenous Q24H Brittanie Hernández PA-C 2,000 mg (01/26/23 1023)   • fluticasone  1 spray Nasal Daily Charleen Kay MD     • guaiFENesin  1,200 mg Oral Q12H [address] [place], DO     • insulin glargine  35 Units Subcutaneous HS Marilyn Stiles, DO     • insulin lispro  2-12 Units Subcutaneous 4x Daily (AC & HS) Gianni Falling, DO     • levalbuterol  1 25 mg Nebulization Q8H PRN Gianni Hussein, DO     • meclizine  25 mg Oral Q8H [address] Sindhu Pinto MD     • metoprolol succinate  50 mg Oral Daily Geneva Watson MD     • montelukast  10 mg Oral HS Charleen Kay MD     • ondansetron  4 mg Oral Q6H PRN Brittanie Hernández PA-C     • oxymetazoline  2 spray Each Nare Q12H Herman Salguero MD     • polyethylene glycol  17 g Oral Daily PRN Charleen Kay MD     • polyethylene glycol  17 g Oral Daily Geneva Watson MD     • pravastatin  80 mg Oral Daily With Mario Pelletier MD     • pregabalin  100 mg Oral BID Charleen Kay MD     • senna-docusate sodium  1 tablet Oral BID Geneva Watson MD     • sodium chloride  1 application Nasal Herman Salguero MD     • sodium chloride  1 spray Each Nare Q2H Geneva Watson MD     • torsemide 40 mg Oral Daily Harmony Houser DO          Today, Patient Was Seen By: Sherwin Zaldivar    **Please Note: This note may have been constructed using a voice recognition system  **

## 2023-01-26 NOTE — ASSESSMENT & PLAN NOTE
Has occasional nose bleeds at home due to [facility]  1/26 is having a prolonged nose bleed with large clot despite compression and nasal spray/gel         PLAN  -Humidifier for NC O2 supplement   -Nasal spray/gel scheduled q4h  -If doesn't improve, consider consulting ENT for nasal packing   -Eliquis 5mg transitioned from IV to PO   -F/u PT-INR

## 2023-01-26 NOTE — PLAN OF CARE
Problem: Prexisting or High Potential for Compromised Skin Integrity  Goal: Skin integrity is maintained or improved  Description: INTERVENTIONS:  - Identify patients at risk for skin breakdown  - Assess and monitor skin integrity  - Assess and monitor nutrition and hydration status  - Monitor labs   - Assess for incontinence   - Turn and reposition patient  - Assist with mobility/ambulation  - Relieve pressure over bony prominences  - Avoid friction and shearing  - Provide appropriate hygiene as needed including keeping skin clean and dry  - Evaluate need for skin moisturizer/barrier cream  - Collaborate with interdisciplinary team   - Patient/family teaching  - Consider wound care consult   Outcome: Progressing     Problem: Nutrition/Hydration-ADULT  Goal: Nutrient/Hydration intake appropriate for improving, restoring or maintaining nutritional needs  Description: Monitor and assess patient's nutrition/hydration status for malnutrition  Collaborate with interdisciplinary team and initiate plan and interventions as ordered  Monitor patient's weight and dietary intake as ordered or per policy  Utilize nutrition screening tool and intervene as necessary  Determine patient's food preferences and provide high-protein, high-caloric foods as appropriate       INTERVENTIONS:  - Monitor oral intake, urinary output, labs, and treatment plans  - Assess nutrition and hydration status and recommend course of action  - Evaluate amount of meals eaten  - Assist patient with eating if necessary   - Allow adequate time for meals  - Recommend/ encourage appropriate diets, oral nutritional supplements, and vitamin/mineral supplements  - Order, calculate, and assess calorie counts as needed  - Recommend, monitor, and adjust tube feedings based on assessed needs  - Assess need for intravenous fluids  - Provide nutrition/hydration education as appropriate  - Include patient/family/caregiver in decisions related to nutrition  Outcome: Progressing     Problem: PAIN - ADULT  Goal: Verbalizes/displays adequate comfort level or baseline comfort level  Description: Interventions:  - Encourage patient to monitor pain and request assistance  - Assess pain using appropriate pain scale  - Administer analgesics based on type and severity of pain and evaluate response  - Implement non-pharmacological measures as appropriate and evaluate response  - Consider cultural and social influences on pain and pain management  - Notify physician/advanced practitioner if interventions unsuccessful or patient reports new pain  Outcome: Progressing     Problem: INFECTION - ADULT  Goal: Absence or prevention of progression during hospitalization  Description: INTERVENTIONS:  - Assess and monitor for signs and symptoms of infection  - Monitor lab/diagnostic results  - Monitor all insertion sites, i e  indwelling lines, tubes, and drains  - Monitor endotracheal if appropriate and nasal secretions for changes in amount and color  - [place] appropriate cooling/warming therapies per order  - Administer medications as ordered  - Instruct and encourage patient and family to use good hand hygiene technique  - Identify and instruct in appropriate isolation precautions for identified infection/condition  Outcome: Progressing

## 2023-01-26 NOTE — ASSESSMENT & PLAN NOTE
Patient presents after syncopal episode while sitting on the toilet  Past medical history significant for atrial fibrillation maintained on metoprolol and Eliquis, CHF on torsemide 40 mg daily, and type 2 diabetes  Patient says she went to the bathroom earlier this morning and was found on the bathroom floor by her granddaughter  Patient does not recall prodromal symptoms prior to passing out but says she may have been trying to stand up  She denies straining just before losing consciousness  Granddaughter reports O2 saturation at the time of finding the patient was approximately 88%  Patient found to be tachycardic and hypertensive on POA but she did not take her metoprolol this morning  EKG shows sinus tachycardia  Troponins are negative  Stress test from August 2022 shows an EF 70%  As of now there is an unclear etiology of why the patient lost consciousness, however tachyarrhythmia and orthostatic hypotension are certainly possibilities  She will need further work-up and observation      Ddx: orthostatic hypotension vs  low BP in s/o PNA infx vs  tachyarrhythmia     PLAN:  -Orthostatic vitals completed 1/25 - positive but cuff timed out x2 on standing so may not be reliable, assx so do not need to repeat   -Continue Telemetry  -Trend BMP  -Fall precautions ordered

## 2023-01-26 NOTE — ASSESSMENT & PLAN NOTE
Lab Results   Component Value Date    EGFR 38 01/26/2023    EGFR 36 01/25/2023    EGFR 25 01/24/2023    CREATININE 1 39 (H) 01/26/2023    CREATININE 1 45 (H) 01/25/2023    CREATININE 1 97 (H) 01/24/2023   Baseline creatinine seems to range between 1 5-1 8      PLAN  -Trend BMP for worsening creatinine  -Avoid hypotension and nephrotoxic agents  -Monitor for changes in urinary output  -Track I/O's

## 2023-01-26 NOTE — CASE MANAGEMENT
Case Management Progress Note    Patient name Johnna Marinelli  Location S /S -01 MRN [id number]  : 1950 Date 2023       LOS (days): 4  Geometric Mean LOS (GMLOS) (days): 5 00  Days to GMLOS:0 8        OBJECTIVE:        Current admission status: Inpatient  Preferred Pharmacy:   [facility] TO E-PRESCRIBE  No address on file      Primary Care Provider: Janet De Paz MD    Primary Insurance: MEDICARE  Secondary Insurance: DOZ    PROGRESS NOTE:  Pt accepted the offered [facility] services of [facility] [facility] [address] [place] [address] for home therapy

## 2023-01-26 NOTE — DISCHARGE SUMMARY
[facility]  Discharge- Chapis Gee 1950, 81 y o  female MRN: [id number]  Unit/Bed#: S -01 Encounter: [id number]  Primary Care Provider: Peg West MD   Date and time admitted to hospital: 1/22/2023  9:17 AM    * Pneumonia  Assessment & Plan  Patient presents after syncopal episode while being on the toilet  Past medical history significant for COPD, CHF, obstructive sleep apnea with poor CPAP compliance  Granddaughter found patient unconscious on the floor  O2 sat at that time was reportedly 88%  Patient reports not feeling well for the past several days complaining of productive cough with green sputum  Patient denies fever, chills or sweats  No chest pain or complaints of being unusually short of breath reported  She is found to be tachycardic on arrival to the emergency department, patient does admit to not taking her metoprolol this morning  Work-up in the emergency department significant for leukocytosis and CT concerning for multifocal right sided pneumonia  Given 500 cc bolus and one-time dose of Rocephin in the emergency department  Physical exam mostly noncontributory due to large body habitus  Sputum cx - gram positive rods 3+, gram positive cocci in pairs 3+, gram negative rods 4+   Blood cx - [id number]     Procal downtrending   Lab Results   Component Value Date    PROCALCITONI 0 83 (H) 01/24/2023    PROCALCITONI 1 28 (H) 01/23/2023    PROCALCITONI 0 13 01/22/2023     WBC downtrending   Lab Results   Component Value Date    WBC 9 09 01/27/2023    WBC 9 60 01/26/2023    WBC 9 32 01/25/2023    WBC 11 07 (H) 01/24/2023    WBC 15 36 (H) 01/23/2023         PLAN  -completed 5 day treatment  No further antibiotics required      Sepsis [facility])  Assessment & Plan  Secondary to community-acquired pneumonia    Patient presents with leukocytosis, tachycardia and suspected source of right sided multifocal pneumonia  Please refer to A&P under community-acquired pneumonia    Sputum cx (1/22): gram + rods 3+, gram positive cocci in pairs 3+, gram negative rods 4+     Lab Results   Component Value Date    WBC 9 09 01/27/2023    WBC 9 60 01/26/2023    WBC 9 32 01/25/2023    WBC 11 07 (H) 01/24/2023    WBC 15 36 (H) 01/23/2023    PROCALCITONI 0 83 (H) 01/24/2023    PROCALCITONI 1 28 (H) 01/23/2023    PROCALCITONI 0 13 01/22/2023     Lab Results   Component Value Date    BLOODCX No Growth After 4 Days  01/22/2023    BLOODCX No Growth After 4 Days  01/22/2023       PLAN  -Fluids normal saline 100 cc/h - d/c'ed 1/24  -Follow-up blood cultures   -Monitor temperature curve, CBC  -Follow-up blood cultures   -Monitor temperature curve, CBC  -IV Ceftriaxone 2g daily - 5 day course completed (1/22-1/26)  -Urine strep and legionella - needs to be collected         Epistaxis  Assessment & Plan  Has occasional nose bleeds at home due to [facility]  1/26 is having a prolonged nose bleed with large clot despite compression and nasal spray/gel      -Resolved    Morbid obesity with BMI of 50 0-59 9, adult [facility])  Assessment & Plan  PLAN  Follow up with PCP for weight loss management at [place]    Open wound  Assessment & Plan  Patient has history of open wound on right heel for which she follows up with wound care as outpatient  Family seems to be very supportive and helping keep the wound clean  Past medical history is significant for type 2 diabetes  Neurovascular exam of both feet appears to be intact  Wound on right heel is exposed but does not appear to be purulent or infected        Podiatry recs: Ulcer appears clinically stable and noninfected  Right heel dressed with Maxorb Ag and Allevyn foam pad  Can change dressing every other day with wound care  No podiatric interventions warranted during this admission    PLAN  Follow-up with podiatry    Obstructive sleep apnea  Assessment & Plan  Patient has past medical history obstructive sleep apnea for which she is supposed to be using a CPAP at night  Patient reports poor compliance with CPAP due to discomfort with the mask  CPAP ordered for this admission  PLAN  Follow-up with sleep medicine at discharge  -Continue home 2L NC overnight during admission   -Add humidifier to NC to avoid nasal dryness and epistaxis         Hypertension  Assessment & Plan  Home meds include Toprol-XL 50 mg and torsemide 40 mg daily  Hold above meds for now- 1/23/2023  metoprolol 50mg daily restarted 1/24     PLAN   -Continue home metoprolol 50mg daily and Torsemide 20mg daily       CKD (chronic kidney disease)  Assessment & Plan  Lab Results   Component Value Date    EGFR 32 01/27/2023    EGFR 38 01/26/2023    EGFR 36 01/25/2023    CREATININE 1 58 (H) 01/27/2023    CREATININE 1 39 (H) 01/26/2023    CREATININE 1 45 (H) 01/25/2023   Baseline creatinine seems to range between 1 5-1 8  PLAN  Follow-up BMP in 1 week  -    Atrial fibrillation (HCC)  Assessment & Plan  Maintained on Toprol-XL 50 mg daily and Eliquis 5 mg twice daily  Patient is presenting for syncopal episode  EKG on this admission shows sinus tachycardia with heart rate of 113, patient noted to be septic on this admission and she admits to not taking her metoprolol this morning  PLAN  Stable for discharge, continue medications as prescribed      CHF (congestive heart failure) ([facility])  Assessment & Plan  Wt Readings from Last 3 Encounters:   01/27/23 115 kg (252 lb 13 9 oz)     Maintained on torsemide 40 mg daily  Stress test in August 2022 shows an ejection fraction of 70%  Patient appears to be euvolemic on this admission  Continue home medications  Monitor BMP  Magnesium level ordered  Cardiac diet ordered      Stable for discharge, resume home meds    Diabetes [facility])  Assessment & Plan  Lab Results   Component Value Date    HGBA1C 11 0 (H) 01/23/2023       Recent Labs     01/26/23  1706 01/26/23  2104 01/27/23  0736 01/27/23  1126   POCGLU 339* 341* 95 274*       Blood Sugar Average: Last 72 hrs:  (P) 910 [id number]  Blood sugar appears to be poorly controlled on this admission  Patient is maintained on Toujeo 25 units before breakfast and 25 units before bedtime along with mealtime Humalog  Patient admits to only checking her blood sugars and taking mealtime insulin at breakfast     PLAN:  -HBA1C- 11%  -Has borderline low glucose in am- might have some Somoji effect  - Decreased basal insulin to 25 u  -Follow-up with endocrinology in 2 weeks    COPD (chronic obstructive pulmonary disease) ([facility])  Assessment & Plan  Maintained on albuterol every 6 hours as needed, daily fluticasone, Singulair nightly  Patient uses 2 L nasal cannula oxygen at nighttime  PLAN:  -Continue home maintenance medications- singulair  - stable for discharge    Syncope  Assessment & Plan  Patient presents after syncopal episode while sitting on the toilet  Past medical history significant for atrial fibrillation maintained on metoprolol and Eliquis, CHF on torsemide 40 mg daily, and type 2 diabetes  Patient says she went to the bathroom earlier this morning and was found on the bathroom floor by her granddaughter  Patient does not recall prodromal symptoms prior to passing out but says she may have been trying to stand up  She denies straining just before losing consciousness  Granddaughter reports O2 saturation at the time of finding the patient was approximately 88%  Patient found to be tachycardic and hypertensive on POA but she did not take her metoprolol this morning  EKG shows sinus tachycardia  Troponins are negative  Stress test from August 2022 shows an EF 70%  As of now there is an unclear etiology of why the patient lost consciousness, however tachyarrhythmia and orthostatic hypotension are certainly possibilities  She will need further work-up and observation      Ddx: orthostatic hypotension vs  low BP in s/o PNA infx vs  tachyarrhythmia     PLAN:  -stable for dcd  - aye stockings        Fall  Assessment & Plan  Fall following syncopal episode at home, home pulse ox 88%  CT head and trauma series negative    PLAN  -Fall precautions   Tylenol 650  Every 8 hours as needed  -PT/OT - rec post-acute rehab  -Patient stable for discharge to rehab- [facility]    NATALIA (acute kidney injury) (HCC)-resolved as of 1/25/2023  Assessment & Plan  Recent Labs     01/25/23  0546 01/26/23  0643 01/27/23  0501   CREATININE 1 45* 1 39* 1 58*   EGFR 36 38 32     Estimated Creatinine Clearance: 39 8 mL/min (A) (by C-G formula based on SCr of 1 58 mg/dL (H))  Baseline 1 5  NATALIA secondary to sepsis and hypotension    PLAN  -Isolyte 100cc/hr d/c'ed 1/24  -Monitor I/O's  -Daily BMP  -Consider nephrology consultation if worsening kidney fxn    Medical Problems     Resolved Problems  Date Reviewed: 1/22/2023          Resolved    NATALIA (acute kidney injury) ([facility] ) 1/25/2023     Resolved by  Emmett Anneberly Physician / Practitioner: Bruce Mann MD  PCP: Van Rabago MD  Admission Date:   Admission Orders (From admission, onward)     Ordered        01/22/23 1044  Inpatient Admission  Once                      Discharge Date: 01/27/23    Consultations During Hospital Stay:  · Podiatry   · PT  · OT  · RT  Social work/case management     Procedures Performed:   · None     Significant Findings / Test Results:   · CXR (1/22/23): Peripheral consolidative opacity in right lower lobe, suspicious for pneumonia  · CT abdomen pelv wo contrast (1/22/23): Peripheral consolidative opacity in right lower lobe with scattered micronodular opacities in bilateral lower lobes, findings are suspicious for multifocal pneumonia  · Leukocytosis   · Tachycardic  · High pro-calcitonin  · Elevated creatinine   · Hemoglobin A1C 11     Incidental Findings:   · None     Test Results Pending at Discharge (will require follow up):    · None     Outpatient Tests Requested:  · Chest X-ray in 4-6 weeks following discharge     Complications:  NATALIA, Epistaxis     Reason for Admission: Syncopal episode and Pneumonia     Hospital Course:   Luna Snow is a 81 y o  female patient with a PMH of HTN, T2DM (on lantus 35U at night), COPD, OSMANY (2L NC overnight), CHF, atrial fibrillation (on Eliquis 5mg and metoprolol 50mg), and CKD (BL Cr 1 5-1 8) who presents after a syncopal episode with subsequent fall the morning of admission and several days of productive cough  CT chest abdomen pelvis was incidentally concerning for right-sided multifocal pneumonia  Syncope likely secondary to orthostatic hypotension vs  Low blood pressure in setting of infectious pneumonia vs tachyarrhythmia  Patient was placed on telemetrey for cardiac monitoring  Patient received 5 days of IV ceftriaxone  Blood and sputum cultures were collected and unremarkable  Provided with incentive spirometer and flutter valve  WBC and pro-poncho subsequently downtrending  Metoprolol and torsemide held temporarily due to incidental low blood pressure and NATALIA but restarted upon resolution, prior to discharge  Evaluated by PT and OT, both recommended acute rehab  Patient is medically stable and ready for discharge to ????        Please see above list of diagnoses and related plan for additional information  Condition at Discharge: good    Discharge Day Visit / Exam:   Subjective:    Vitals: Blood Pressure: (!) 118/48 (01/27/23 0940)  Pulse: 71 (01/27/23 0940)  Temperature: 98 2 °F (36 8 °C) (01/27/23 0737)  Temp Source: Oral (01/23/23 0334)  Respirations: 18 (01/26/23 2159)  Height: 5' 2" (157 5 cm) (01/22/23 2584)  Weight - Scale: 115 kg (252 lb 13 9 oz) (01/27/23 0600)  SpO2: 99 % (01/27/23 0940)  Exam:   Physical Exam  Vitals and nursing note reviewed  Constitutional:       General: She is not in acute distress  Appearance: She is well-developed  HENT:      Head: Normocephalic and atraumatic     Eyes:      Conjunctiva/sclera: Conjunctivae normal    Cardiovascular:      Rate and Rhythm: Normal rate and regular rhythm  Heart sounds: No murmur heard  Pulmonary:      Effort: Pulmonary effort is normal  No respiratory distress  Breath sounds: Normal breath sounds  Abdominal:      Palpations: Abdomen is soft  Tenderness: There is no abdominal tenderness  Musculoskeletal:         General: No swelling  Cervical back: Neck supple  Skin:     General: Skin is warm and dry  Capillary Refill: Capillary refill takes less than 2 seconds  Neurological:      Mental Status: She is alert  Psychiatric:         Mood and Affect: Mood normal          Discussion with Family: Updated  (niece) via phone  Discharge instructions/Information to patient and family:   See after visit summary for information provided to patient and family  Provisions for Follow-Up Care:  See after visit summary for information related to follow-up care and any pertinent home health orders  Disposition:   Acute Rehab at [facility]3]    Planned Readmission: no     Discharge Statement:  I spent 30 minutes discharging the patient  This time was spent on the day of discharge  I had direct contact with the patient on the day of discharge  Greater than 50% of the total time was spent examining patient, answering all patient questions, arranging and discussing plan of care with patient as well as directly providing post-discharge instructions  Additional time then spent on discharge activities  Discharge Medications:  See after visit summary for reconciled discharge medications provided to patient and/or family        **Please Note: This note may have been constructed using a voice recognition system**

## 2023-01-26 NOTE — PROGRESS NOTES
I13 0, E11 22  [facility]  coding opportunities          Chart Reviewed number of suggestions sent to Provider: 2     Patients Insurance     Medicare Insurance: Medicare

## 2023-01-26 NOTE — CASE MANAGEMENT
Case Management Discharge Planning Note    Patient name Milas Seip Location S /S -01 MRN [id number]  : 1950 Date 2023       Current Admission Date: 2023  Current Admission Diagnosis:Pneumonia   Patient Active Problem List    Diagnosis Date Noted   • Epistaxis 2023   • Morbid obesity with BMI of 50 0-59 9, adult ([facility] ) 2023   • Fall 2023   • Sepsis ([facility] ) 2023   • Pneumonia 2023   • Syncope 2023   • COPD (chronic obstructive pulmonary disease) ([facility] ) 2023   • Diabetes ([facility] ) 2023   • CHF [facility]congestive heart failure) ([facility] ) 2023   • Atrial fibrillation ([facility] ) 2023   • CKD (chronic kidney disease) 2023   • Hypertension 2023   • Obstructive sleep apnea 2023   • Open wound 2023      LOS (days): 4  Geometric Mean LOS (GMLOS) (days): 5 00  Days to GMLOS:0 8     OBJECTIVE:  Risk of Unplanned Readmission Score: 17 45         Current admission status: Inpatient   Preferred Pharmacy:   UNKNOWN - FOLLOW UP PRIOR TO DISCHARGE TO E-PRESCRIBE  No address on file      Primary Care Provider: Yovanny Mishra MD    Primary Insurance: MEDICARE  Secondary Insurance: CIGNA    DISCHARGE DETAILS:    Discharge planning discussed with[de-identified] Patient & Zurdo     Comments - Freedom of Choice: Pt is now agreeable to rehab  CM contacted family/caregiver?: Yes  Were Treatment Team discharge recommendations reviewed with patient/caregiver?: Yes  Did patient/caregiver verbalize understanding of patient care needs?: Yes  Were patient/caregiver advised of the risks associated with not following Treatment Team discharge recommendations?: Yes    Contacts  Patient Contacts: Kalyn Pena  Relationship to Patient[de-identified] Family  Contact Method:  In Person  Reason/Outcome: Referral, Discharge [address]         Is the patient interested in [facility] at discharge?: No         Other Referral/Resources/Interventions Provided:  Referral Comments: CM met with Pt and niece per they request  Pt reported after speaking with family she is now agreeable to SNF and requested a blanket referral excluding [facility] and [facility]   CM made the requested referral

## 2023-01-26 NOTE — ASSESSMENT & PLAN NOTE
Patient presents after syncopal episode while being on the toilet  Past medical history significant for COPD, CHF, obstructive sleep apnea with poor CPAP compliance  Granddaughter found patient unconscious on the floor  O2 sat at that time was reportedly 88%  Patient reports not feeling well for the past several days complaining of productive cough with green sputum  Patient denies fever, chills or sweats  No chest pain or complaints of being unusually short of breath reported  She is found to be tachycardic on arrival to the emergency department, patient does admit to not taking her metoprolol this morning  Work-up in the emergency department significant for leukocytosis and CT concerning for multifocal right sided pneumonia  Given 500 cc bolus and one-time dose of Rocephin in the emergency department  Physical exam mostly noncontributory due to large body habitus        Sputum cx - gram positive rods 3+, gram positive cocci in pairs 3+, gram negative rods 4+   Blood cx - [id number]     Procal downtrending   Lab Results   Component Value Date    PROCALCITONI 0 83 (H) 01/24/2023    PROCALCITONI 1 28 (H) 01/23/2023    PROCALCITONI 0 13 01/22/2023     WBC downtrending   Lab Results   Component Value Date    WBC 9 60 01/26/2023    WBC 9 32 01/25/2023    WBC 11 07 (H) 01/24/2023    WBC 15 36 (H) 01/23/2023    WBC 15 76 (H) 01/22/2023         PLAN  -IV Ceftriaxone 2g daily - 5 day course completed (1/22-1/26)  -Mucinex 1200 q12 hours  -Tessalon perles PRN for cough   -Trend CBC  -Incentive spirometry and flutter valve  -Continue supplementary oxygen to maintain O2 sat between 88-92%  -Outpatient CXR 4-6 weeks after discharge

## 2023-01-26 NOTE — ASSESSMENT & PLAN NOTE
Wt Readings from Last 3 Encounters:   01/26/23 114 kg (251 lb 15 8 oz)     Maintained on torsemide 40 mg daily  Stress test in August 2022 shows an ejection fraction of 70%  Patient appears to be euvolemic on this admission  Continue home medications  Monitor BMP  Magnesium level ordered  Cardiac diet ordered      PLAN  -Held home torsemide in the setting of NATALIA - restarted home Torsemide 40mg daily (1/25)  -Assess volume status daily

## 2023-01-26 NOTE — ASSESSMENT & PLAN NOTE
Home meds include Toprol-XL 50 mg and torsemide 40 mg daily  Hold above meds for now- 1/23/2023  metoprolol 50mg daily restarted 1/24     PLAN   -Continue home metoprolol 50mg daily and Torsemide 40mg daily

## 2023-01-26 NOTE — ASSESSMENT & PLAN NOTE
Fall following syncopal episode at home, home pulse ox 88%  CT head and trauma series negative    PLAN  -Fall precautions   Tylenol 650 q6h PRN for mild pain   -PT/OT - rec post-acute rehab  -Orthostatic vitals completed 1/25 - positive but cuff timed out x2 on standing so may not be reliable, assx so do not need to repeat

## 2023-01-26 NOTE — ASSESSMENT & PLAN NOTE
Secondary to community-acquired pneumonia    Patient presents with leukocytosis, tachycardia and suspected source of right sided multifocal pneumonia  Please refer to A&P under community-acquired pneumonia    Sputum cx (1/22): gram + rods 3+, gram positive cocci in pairs 3+, gram negative rods 4+     Lab Results   Component Value Date    WBC 9 60 01/26/2023    WBC 9 32 01/25/2023    WBC 11 07 (H) 01/24/2023    WBC 15 36 (H) 01/23/2023    WBC 15 76 (H) 01/22/2023    PROCALCITONI 0 83 (H) 01/24/2023    PROCALCITONI 1 28 (H) 01/23/2023    PROCALCITONI 0 13 01/22/2023     Lab Results   Component Value Date    BLOODCX No Growth at 72 hrs  01/22/2023    BLOODCX No Growth at 72 hrs  01/22/2023       PLAN  -Fluids normal saline 100 cc/h - d/c'ed 1/24  -Follow-up blood cultures   -Monitor temperature curve, CBC  -Follow-up blood cultures   -Monitor temperature curve, CBC  -IV Ceftriaxone 2g daily - 5 day course completed (1/22-1/26)  -Urine strep and legionella - needs to be collected

## 2023-01-26 NOTE — CASE MANAGEMENT
Case Management Discharge Planning Note    Patient name Rosa Rangel  Location S /S -01 MRN [id number]  : 1950 Date 2023       Current Admission Date: 2023  Current Admission Diagnosis:Pneumonia   Patient Active Problem List    Diagnosis Date Noted   • Epistaxis 2023   • Morbid obesity with BMI of 50 0-59 9, adult ([facility] ) 2023   • Fall 2023   • Sepsis ([facility] ) 2023   • Pneumonia 2023   • Syncope 2023   • COPD (chronic obstructive pulmonary disease) ([facility] ) 2023   • Diabetes ([facility] ) 2023   • CHF (congestive heart failure) ([facility] ) 2023   • Atrial fibrillation ([facility] ) 2023   • CKD (chronic kidney disease) 2023   • Hypertension 2023   • Obstructive sleep apnea 2023   • Open wound 2023      LOS (days): 4  Geometric Mean LOS (GMLOS) (days): 5 00  Days to GMLOS:0 9     OBJECTIVE:  Risk of Unplanned Readmission Score: 17 59         Current admission status: Inpatient   Preferred Pharmacy:   [facility] TO E-PRESCRIBE  No address on file      Primary Care Provider: Sha Flores MD    Primary Insurance: MEDICARE  Secondary Insurance: CIGNA    DISCHARGE DETAILS:    Discharge planning discussed with[de-identified] Patient     Comments - Freedom of Choice: Patient continues to refuse the recommendationof SNF           [facility]         Is the patient interested in [address] at discharge?: Yes  [address] requested[de-identified] [address] Name[de-identified] Other  [address] Provider[de-identified] PCP  Home Health Services Needed[de-identified] Gait/ADL Training, Strengthening/Theraputic Exercises to Improve Function  Homebound Criteria Met[de-identified] Uses an Assist Device (i e  cane, walker, etc)  Supporting Clincal Findings[de-identified] Limited Endurance, Fatigues Easliy in [employer]    DME Referral Provided  Referral made for DME?: No    Other Referral/Resources/Interventions Provided:  Interventions: Short Term Rehab  Referral Comments: CM met with Pt to follow up on the recommendation of SNF which she continues to refuse, reported she would prefer to go home with [facility] services   Pt denied having a [facility] agency of preference and gave permission for a blanket referral  CM discussed freedom of choice and made the referral       Treatment Team Recommendation: Short Term Rehab  Discharge Destination Plan[de-identified] Home with [facility]

## 2023-01-27 VITALS
HEIGHT: 62 IN | TEMPERATURE: 98.2 F | WEIGHT: 252.87 LBS | WEIGHT: 252.87 LBS | OXYGEN SATURATION: 99 % | HEART RATE: 71 BPM | HEIGHT: 62 IN | BODY MASS INDEX: 46.53 KG/M2 | RESPIRATION RATE: 18 BRPM | OXYGEN SATURATION: 99 % | SYSTOLIC BLOOD PRESSURE: 118 MMHG | RESPIRATION RATE: 18 BRPM | DIASTOLIC BLOOD PRESSURE: 48 MMHG | BODY MASS INDEX: 46.53 KG/M2 | TEMPERATURE: 98.2 F | DIASTOLIC BLOOD PRESSURE: 48 MMHG | SYSTOLIC BLOOD PRESSURE: 118 MMHG | HEART RATE: 71 BPM

## 2023-01-27 LAB
ANION GAP SERPL CALCULATED.3IONS-SCNC: 5 MMOL/L (ref 4–13)
ANION GAP SERPL CALCULATED.3IONS-SCNC: 5 MMOL/L (ref 4–13)
BACTERIA BLD CULT: NORMAL
BASOPHILS # BLD AUTO: 0.1 THOUSANDS/ÂΜL (ref 0–0.1)
BASOPHILS # BLD AUTO: 0.1 THOUSANDS/ÂΜL (ref 0–0.1)
BASOPHILS NFR BLD AUTO: 1 % (ref 0–1)
BASOPHILS NFR BLD AUTO: 1 % (ref 0–1)
BUN SERPL-MCNC: 43 MG/DL (ref 5–25)
BUN SERPL-MCNC: 43 MG/DL (ref 5–25)
CALCIUM SERPL-MCNC: 8.6 MG/DL (ref 8.4–10.2)
CALCIUM SERPL-MCNC: 8.6 MG/DL (ref 8.4–10.2)
CHLORIDE SERPL-SCNC: 97 MMOL/L (ref 96–108)
CHLORIDE SERPL-SCNC: 97 MMOL/L (ref 96–108)
CO2 SERPL-SCNC: 36 MMOL/L (ref 21–32)
CO2 SERPL-SCNC: 36 MMOL/L (ref 21–32)
CREAT SERPL-MCNC: 1.58 MG/DL (ref 0.6–1.3)
CREAT SERPL-MCNC: 1.58 MG/DL (ref 0.6–1.3)
EOSINOPHIL # BLD AUTO: 0.65 THOUSAND/ÂΜL (ref 0–0.61)
EOSINOPHIL # BLD AUTO: 0.65 THOUSAND/ÂΜL (ref 0–0.61)
EOSINOPHIL NFR BLD AUTO: 7 % (ref 0–6)
EOSINOPHIL NFR BLD AUTO: 7 % (ref 0–6)
ERYTHROCYTE [DISTWIDTH] IN BLOOD BY AUTOMATED COUNT: 13.8 % (ref 11.6–15.1)
ERYTHROCYTE [DISTWIDTH] IN BLOOD BY AUTOMATED COUNT: 13.8 % (ref 11.6–15.1)
GFR SERPL CREATININE-BSD FRML MDRD: 32 ML/MIN/1.73SQ M
GFR SERPL CREATININE-BSD FRML MDRD: 32 ML/MIN/1.73SQ M
GLUCOSE SERPL-MCNC: 274 MG/DL (ref 65–140)
GLUCOSE SERPL-MCNC: 274 MG/DL (ref 65–140)
GLUCOSE SERPL-MCNC: 90 MG/DL (ref 65–140)
GLUCOSE SERPL-MCNC: 90 MG/DL (ref 65–140)
GLUCOSE SERPL-MCNC: 95 MG/DL (ref 65–140)
GLUCOSE SERPL-MCNC: 95 MG/DL (ref 65–140)
HCT VFR BLD AUTO: 36.1 % (ref 34.8–46.1)
HCT VFR BLD AUTO: 36.1 % (ref 34.8–46.1)
HGB BLD-MCNC: 11.3 G/DL (ref 11.5–15.4)
HGB BLD-MCNC: 11.3 G/DL (ref 11.5–15.4)
IMM GRANULOCYTES # BLD AUTO: 0.11 THOUSAND/UL (ref 0–0.2)
IMM GRANULOCYTES # BLD AUTO: 0.11 THOUSAND/UL (ref 0–0.2)
IMM GRANULOCYTES NFR BLD AUTO: 1 % (ref 0–2)
IMM GRANULOCYTES NFR BLD AUTO: 1 % (ref 0–2)
LYMPHOCYTES # BLD AUTO: 1.98 THOUSANDS/ÂΜL (ref 0.6–4.47)
LYMPHOCYTES # BLD AUTO: 1.98 THOUSANDS/ÂΜL (ref 0.6–4.47)
LYMPHOCYTES NFR BLD AUTO: 22 % (ref 14–44)
LYMPHOCYTES NFR BLD AUTO: 22 % (ref 14–44)
MCH RBC QN AUTO: 29.8 PG (ref 26.8–34.3)
MCH RBC QN AUTO: 29.8 PG (ref 26.8–34.3)
MCHC RBC AUTO-ENTMCNC: 31.3 G/DL (ref 31.4–37.4)
MCHC RBC AUTO-ENTMCNC: 31.3 G/DL (ref 31.4–37.4)
MCV RBC AUTO: 95 FL (ref 82–98)
MCV RBC AUTO: 95 FL (ref 82–98)
MONOCYTES # BLD AUTO: 1.23 THOUSAND/ÂΜL (ref 0.17–1.22)
MONOCYTES # BLD AUTO: 1.23 THOUSAND/ÂΜL (ref 0.17–1.22)
MONOCYTES NFR BLD AUTO: 14 % (ref 4–12)
MONOCYTES NFR BLD AUTO: 14 % (ref 4–12)
NEUTROPHILS # BLD AUTO: 5.02 THOUSANDS/ÂΜL (ref 1.85–7.62)
NEUTROPHILS # BLD AUTO: 5.02 THOUSANDS/ÂΜL (ref 1.85–7.62)
NEUTS SEG NFR BLD AUTO: 55 % (ref 43–75)
NEUTS SEG NFR BLD AUTO: 55 % (ref 43–75)
NRBC BLD AUTO-RTO: 0 /100 WBCS
NRBC BLD AUTO-RTO: 0 /100 WBCS
PLATELET # BLD AUTO: 197 THOUSANDS/UL (ref 149–390)
PLATELET # BLD AUTO: 197 THOUSANDS/UL (ref 149–390)
PMV BLD AUTO: 10.6 FL (ref 8.9–12.7)
PMV BLD AUTO: 10.6 FL (ref 8.9–12.7)
POTASSIUM SERPL-SCNC: 4.4 MMOL/L (ref 3.5–5.3)
POTASSIUM SERPL-SCNC: 4.4 MMOL/L (ref 3.5–5.3)
RBC # BLD AUTO: 3.79 MILLION/UL (ref 3.81–5.12)
RBC # BLD AUTO: 3.79 MILLION/UL (ref 3.81–5.12)
SARS-COV-2 RNA RESP QL NAA+PROBE: NEGATIVE
SARS-COV-2 RNA RESP QL NAA+PROBE: NEGATIVE
SODIUM SERPL-SCNC: 138 MMOL/L (ref 135–147)
SODIUM SERPL-SCNC: 138 MMOL/L (ref 135–147)
WBC # BLD AUTO: 9.09 THOUSAND/UL (ref 4.31–10.16)
WBC # BLD AUTO: 9.09 THOUSAND/UL (ref 4.31–10.16)

## 2023-01-27 RX ORDER — GUAIFENESIN 1200 MG/1
1200 TABLET, EXTENDED RELEASE ORAL EVERY 12 HOURS SCHEDULED
Qty: 14 TABLET | Refills: 0 | Status: SHIPPED | OUTPATIENT
Start: 2023-01-27 | End: 2023-02-03

## 2023-01-27 RX ORDER — TORSEMIDE 20 MG/1
20 TABLET ORAL DAILY
Status: DISCONTINUED | OUTPATIENT
Start: 2023-01-28 | End: 2023-01-27 | Stop reason: HOSPADM

## 2023-01-27 RX ORDER — BENZONATATE 200 MG/1
200 CAPSULE ORAL 3 TIMES DAILY PRN
Qty: 20 CAPSULE | Refills: 0 | Status: SHIPPED | OUTPATIENT
Start: 2023-01-27

## 2023-01-27 RX ORDER — OXYMETAZOLINE HYDROCHLORIDE 0.05 G/100ML
2 SPRAY NASAL EVERY 12 HOURS SCHEDULED
Qty: 30 ML | Refills: 0 | Status: CANCELLED | OUTPATIENT
Start: 2023-01-27 | End: 2023-01-29

## 2023-01-27 RX ORDER — ONDANSETRON 4 MG/1
4 TABLET, ORALLY DISINTEGRATING ORAL EVERY 6 HOURS PRN
Qty: 20 TABLET | Refills: 0 | Status: SHIPPED | OUTPATIENT
Start: 2023-01-27

## 2023-01-27 RX ORDER — AMOXICILLIN 250 MG
1 CAPSULE ORAL 2 TIMES DAILY
Qty: 14 TABLET | Refills: 0 | Status: SHIPPED | OUTPATIENT
Start: 2023-01-27 | End: 2023-02-03

## 2023-01-27 RX ORDER — ACETAMINOPHEN 325 MG/1
650 TABLET ORAL EVERY 6 HOURS PRN
Qty: 15 TABLET | Refills: 0 | Status: SHIPPED | OUTPATIENT
Start: 2023-01-27 | End: 2023-02-01

## 2023-01-27 RX ORDER — MECLIZINE HYDROCHLORIDE 25 MG/1
25 TABLET ORAL EVERY 8 HOURS SCHEDULED
Qty: 8 TABLET | Refills: 0 | Status: SHIPPED | OUTPATIENT
Start: 2023-01-27

## 2023-01-27 RX ADMIN — PREGABALIN 100 MG: 100 CAPSULE ORAL at 09:39

## 2023-01-27 RX ADMIN — SALINE NASAL SPRAY 1 SPRAY: 1.5 SOLUTION NASAL at 09:42

## 2023-01-27 RX ADMIN — MECLIZINE HYDROCHLORIDE 25 MG: 25 TABLET ORAL at 06:17

## 2023-01-27 RX ADMIN — OXYMETHAZOLINE HCL 2 SPRAY: 0.05 SPRAY NASAL at 09:43

## 2023-01-27 RX ADMIN — INSULIN LISPRO 6 UNITS: 100 INJECTION, SOLUTION INTRAVENOUS; SUBCUTANEOUS at 12:51

## 2023-01-27 RX ADMIN — METOPROLOL SUCCINATE 50 MG: 50 TABLET, EXTENDED RELEASE ORAL at 09:39

## 2023-01-27 RX ADMIN — GUAIFENESIN 1200 MG: 600 TABLET ORAL at 09:39

## 2023-01-27 RX ADMIN — SALINE NASAL SPRAY 1 SPRAY: 1.5 SOLUTION NASAL at 06:17

## 2023-01-27 RX ADMIN — APIXABAN 5 MG: 5 TABLET, FILM COATED ORAL at 09:39

## 2023-01-27 RX ADMIN — TORSEMIDE 40 MG: 20 TABLET ORAL at 09:39

## 2023-01-27 RX ADMIN — FLUTICASONE PROPIONATE 1 SPRAY: 50 SPRAY, METERED NASAL at 09:42

## 2023-01-27 RX ADMIN — INSULIN GLARGINE 25 UNITS: 100 INJECTION, SOLUTION SUBCUTANEOUS at 12:53

## 2023-01-27 RX ADMIN — SALINE NASAL SPRAY 1 SPRAY: 1.5 SOLUTION NASAL at 14:11

## 2023-01-27 RX ADMIN — SALINE NASAL SPRAY 1 SPRAY: 1.5 SOLUTION NASAL at 12:52

## 2023-01-27 RX ADMIN — SENNOSIDES AND DOCUSATE SODIUM 1 TABLET: 8.6; 5 TABLET ORAL at 09:39

## 2023-01-27 RX ADMIN — MECLIZINE HYDROCHLORIDE 25 MG: 25 TABLET ORAL at 14:11

## 2023-01-27 NOTE — PHYSICAL THERAPY NOTE
PHYSICAL THERAPY NOTE          Patient Name: Celina Mckinley  IGSRM'L Date: 1/27/2023 01/27/23 1122   PT Last Visit   PT Visit Date 01/27/23   Note Type   Note Type Treatment   Pain Assessment   Pain Assessment Tool 0-10   Pain Score No Pain   Pain Location/Orientation Location: Head   Patient's Stated Pain Goal No pain   Hospital Pain Intervention(s) Repositioned; Ambulation/increased activity; Rest   Multiple Pain Sites No   Restrictions/Precautions   Weight Bearing Precautions Per Order No   Other Precautions Chair Alarm; Bed Alarm; Fall Risk  (pt on room air and Sp02 95%)   General   Chart Reviewed Yes   Response to Previous Treatment Patient with no complaints from previous session  Family/Caregiver Present No   Cognition   Overall Cognitive Status WFL   Arousal/Participation Alert; Responsive; Cooperative   Attention Within functional limits   Orientation Level Oriented X4   Memory Decreased recall of recent events   Following Commands Follows multistep commands with increased time or repetition   Comments pt was pleasant, cooperative and motivated throughout tzx session   Subjective   Subjective pt was agreeable to participate in PT intervention   Bed Mobility   Rolling R Unable to assess   Rolling L Unable to assess   Supine to Sit Unable to assess   Sit to Supine Unable to assess   Additional Comments pt seated OOB in the recliner pre/post tx session   Transfers   Sit to Stand 5  Supervision   Additional items Assist x 1; Armrests; Increased time required;Verbal cues  (w/ rW)   Stand to Sit 5  Supervision   Additional items Assist x 1; Armrests; Increased time required;Verbal cues  (w/ RW)   Stand pivot 5  Supervision   Additional items Assist x 1; Armrests; Increased time required;Verbal cues  (w/ RW)   Additional Comments Progress was noted a pt ws able to complete multiple STS to and from rW with a decrease in level of assistance /s   Ambulation/Elevation   Gait pattern Decreased foot clearance; Excessively slow   Gait Assistance 5  Supervision   Additional items Assist x 1;Verbal cues   Assistive Device Rolling walker   Distance 30'x2 RW   Balance   Static Sitting Fair   Static Standing Fair -   Dynamic Standing Fair -   Ambulatory Fair -  (w/ RW)   Endurance Deficit   Endurance Deficit Yes   Endurance Deficit Description limited activity tolerance and functional mobility   Activity Tolerance   Activity Tolerance Patient limited by fatigue   Nurse Made Aware Spoke to RN   Exercises   Hip Abduction Sitting;10 reps;AROM; Bilateral   Hip Adduction Sitting;10 reps;AROM; Bilateral  (pillow squeezes)   Knee AROM Long Arc Quad Sitting;10 reps;AROM; Bilateral   Ankle Pumps Sitting;20 reps;AROM; Bilateral   Marching Sitting;10 reps;AROM; Bilateral   Assessment   Problem List Decreased strength;Decreased endurance; Impaired balance;Decreased mobility;Pain;Decreased skin integrity   Assessment pt began tx session seated OOB in the recliner and was agreeable to participate in PT intervention  In todays tx session PT intervention focused on functional transfers to and from RW, ambulation posture/balance facilitation and ther ex  Progress was noted as pt was able to perform 3 STS to and from RW with a decrease in level of assistance required /s with VC's for hand placement while ascending to RW and descending back to seated in recliner  pt continues to be limited with functional mobility, activity tolerance and ambulation distance as pt required a therapeutic seated rest break after 30'x1 RW of ambulation with /s   pt was able to participate in TE activities while seated in recdliner with good form and no increases in pain  pt was educated on all safe stair trial strategies prior to initiating steps  pt was able to complete 3 steps with bilateral hand rails and /s as pt had no LOB   pt would benefit from continued skilled PT intervention in order to increase fucntional mobility, activity tolerance and ambulation distance greater then house hold distances  post tx pt in recliner with call bell and all pt needs met, chair alarm activated   Goals   Patient Goals to go home   STG Expiration Date 02/04/23   PT Treatment Day 1   Plan   Treatment/Interventions Functional transfer training;LE strengthening/ROM   Progress Slow progress, decreased activity tolerance   PT Frequency 3-5x/wk   Recommendation   PT Discharge Recommendation Post acute rehabilitation services   Equipment Recommended [facility] Recommended Wheeled walker   Change/add to [employer]? No   AM-PAC Basic Mobility Inpatient   Turning in Flat Bed Without Bedrails 3   Lying on Back to Sitting on Edge of Flat Bed Without Bedrails 3   Moving Bed to Chair 3   Standing Up From Chair Using Arms 3   Walk in Room 3   Climb 3-5 Stairs With Railing 3   Basic Mobility Inpatient Raw Score 18   Basic Mobility Standardized Score 41 05   Highest Level Of Mobility   JH-HLM Goal 6: Walk 10 steps or more   JH-HLM Achieved 7: Walk 25 feet or more   Education   Education Provided Mobility training;Assistive device; Other  (functional transfers and stair trials)   Patient Demonstrates acceptance/verbal understanding   End of Consult   Patient Position at End of Consult Bedside chair;Bed/Chair alarm activated; All needs within reach   The patient's AM-PAC Basic Mobility Inpatient Short Form Raw Score is 18  A Raw score of greater than 16 suggests the patient may benefit from discharge to home  Please also refer to the recommendation of the Physical Therapist for safe discharge planning       Jaycee Kwong

## 2023-01-27 NOTE — ASSESSMENT & PLAN NOTE
Home meds include Toprol-XL 50 mg and torsemide 40 mg daily  Hold above meds for now- 1/23/2023  metoprolol 50mg daily restarted 1/24     PLAN   -Continue home metoprolol 50mg daily and Torsemide 20mg daily

## 2023-01-27 NOTE — PLAN OF CARE
Problem: PHYSICAL THERAPY ADULT  Goal: Performs mobility at highest level of function for planned discharge setting  See evaluation for individualized goals  Description: Treatment/Interventions: Functional transfer training, LE strengthening/ROM, Elevations, Therapeutic exercise, Endurance training, Patient/family training, Equipment eval/education, Bed mobility, Gait training  Equipment Recommended: Sosa Schmitz       See flowsheet documentation for full assessment, interventions and recommendations  Outcome: Progressing  Note:    Problem List: Decreased strength, Decreased endurance, Impaired balance, Decreased mobility, Pain, Decreased skin integrity  Assessment: pt began tx session seated OOB in the recliner and was agreeable to participate in PT intervention  In todays tx session PT intervention focused on functional transfers to and from RW, ambulation posture/balance facilitation and ther ex  Progress was noted as pt was able to perform 3 STS to and from RW with a decrease in level of assistance required /s with VC's for hand placement while ascending to RW and descending back to seated in recliner  pt continues to be limited with functional mobility, activity tolerance and ambulation distance as pt required a therapeutic seated rest break after 30'x1 RW of ambulation with /s   pt was able to participate in TE activities while seated in recdliner with good form and no increases in pain  pt was educated on all safe stair trial strategies prior to initiating steps  pt was able to complete 3 steps with bilateral hand rails and /s as pt had no LOB  pt would benefit from continued skilled PT intervention in order to increase fucntional mobility, activity tolerance and ambulation distance greater then house hold distances   post tx pt in recliner with call bell and all pt needs met, chair alarm activated        PT Discharge Recommendation: Post acute rehabilitation services    See flowsheet documentation for full assessment

## 2023-01-27 NOTE — PROGRESS NOTES
-- Patient:  -- MRN: [id number]  -- Aidin Request ID: [id number]  -- Level of care reserved: translation missing: en provider  provider_type  -- Partner Reserved:  -- Clinical needs requested:  -- Geography searched: 40 miles around [facility]  -- Start of Service:  -- Request sent: 4:09pm EST on 1/26/2023 by Alisa Pierson  -- Partner reserved: by Saul King  -- Choice list shared: 12:48pm EST on 1/27/2023 by Saul King

## 2023-01-27 NOTE — ASSESSMENT & PLAN NOTE
Wt Readings from Last 3 Encounters:   01/27/23 115 kg (252 lb 13 9 oz)     Maintained on torsemide 40 mg daily  Stress test in August 2022 shows an ejection fraction of 70%  Patient appears to be euvolemic on this admission  Continue home medications  Monitor BMP  Magnesium level ordered  Cardiac diet ordered      Stable for discharge, resume home meds

## 2023-01-27 NOTE — DISCHARGE INSTR - AVS FIRST PAGE
Dear Hector Reyes,     It was our pleasure to care for you here at [facility]  It is our hope that we were always able to exceed the expected standards for your care during your stay  You were hospitalized due to fall secondary to orthostatic hypotension and  pneumonia  You were cared for on the [facility] 2nd floor by Sydney Simon MD under the service of Earlene Essex, MD with the [facility] Internal [facility] Hospitalist Group who covers for your primary care physician (PCP), Joni Jacob MD, while you were hospitalized  If you have any questions or concerns related to this hospitalization, you may contact us at [phone]  For follow up as well as any medication refills, we recommend that you follow up with your primary care physician  A registered nurse will reach out to you by phone within a few days after your discharge to answer any additional questions that you may have after going home  However, at this time we provide for you here, the most important instructions / recommendations at discharge:     Notable Medication Adjustments -   Please take torsemide 20 mg daily for now and follow up with your blood work  Meclizine 1 tablet three times daily of vertigo  Can continue with laxative senna if constipated  Testing Required after Discharge -   BMP in one week  Important follow up information -     Please follow with PCP in 2 weeks with blood work  Please follow up with endocrinology in  2- 3 weeks weeks    Other Instructions -   Please wait at least 1-3 minutes when changing position from lying to sitting to standing  This will help you avoid dizziness   You are encouraged to wear your stocking to improve your blood circulation and also prevent dizziness  Please review this entire after visit summary as additional general instructions including medication list, appointments, activity, diet, any pertinent wound care, and other additional recommendations from your care team that may be provided for you        Sincerely,     Blake Ayala MD

## 2023-01-27 NOTE — CASE MANAGEMENT
Case Management Discharge Planning Note    Patient name Kat Manning  Location S /S -01 MRN [id number]  : 1950 Date 2023       Current Admission Date: 2023  Current Admission Diagnosis:Pneumonia   Patient Active Problem List    Diagnosis Date Noted   • Epistaxis 2023   • Morbid obesity with BMI of 50 0-59 9, adult ([facility] ) 2023   • Fall 2023   • Sepsis ([facility] ) 2023   • Pneumonia 2023   • Syncope 2023   • COPD (chronic obstructive pulmonary disease) ([facility] ) 2023   • Diabetes ([facility] ) 2023   • CHF (congestive heart failure) ([facility] ) 2023   • Atrial fibrillation ([facility] ) 2023   • CKD (chronic kidney disease) 2023   • Hypertension 2023   • Obstructive sleep apnea 2023   • Open wound 2023      LOS (days): 5  Geometric Mean LOS (GMLOS) (days): 5 00  Days to GMLOS:-0 1     OBJECTIVE:  Risk of Unplanned Readmission Score: 17 48         Current admission status: Inpatient   Preferred Pharmacy:   [facility]  No address on file      Primary Care Provider: Suyapa Asencio MD    Primary Insurance: MEDICARE  Secondary Insurance: [employer]    DISCHARGE DETAILS:    Discharge planning discussed with[de-identified] Patient and Deng atkins of Choice: Yes  Comments - Freedom of Choice: CM provided SNF choice list and patient requested to defer decision to Ama atkins  CM reached out to Ama Fernández via phone and introduced self and role and provided SNF choice list  Ama Fernández accepted [facility]'s bed offer - CM informed the facility    CM contacted family/caregiver?: Yes  Were Treatment Team discharge recommendations reviewed with patient/caregiver?: Yes  Did patient/caregiver verbalize understanding of patient care needs?: N/A- going to facility  Were patient/caregiver advised of the risks associated with not following Treatment Team discharge recommendations?: Yes    Contacts  Patient Contacts: Belen Villeda - sister  Relationship to Patient[de-identified] Family  Contact Method: Phone  Phone Number: See face sheet  Reason/Outcome: Discharge [address]         Is the patient interested in [facility] at discharge?: No    DME Referral Provided  Referral made for DME?: No    Other Referral/Resources/Interventions Provided:  Interventions: Short Term Rehab  Referral Comments: CM provided SNF choice list and patient requested to defer decision to sister, Belen Villeda  CM reached out to Belen Villeda via phone and introduced self and role and provided SNF choice list  Belen Villeda accepted [facility]'s bed offer - CM informed the facility      Would you like to participate in our [address] service program?  : No - Declined    Treatment Team Recommendation: Short Term Rehab  Discharge Destination Plan[de-identified] Short Term Rehab

## 2023-01-27 NOTE — ASSESSMENT & PLAN NOTE
Patient presents after syncopal episode while being on the toilet  Past medical history significant for COPD, CHF, obstructive sleep apnea with poor CPAP compliance  Granddaughter found patient unconscious on the floor  O2 sat at that time was reportedly 88%  Patient reports not feeling well for the past several days complaining of productive cough with green sputum  Patient denies fever, chills or sweats  No chest pain or complaints of being unusually short of breath reported  She is found to be tachycardic on arrival to the emergency department, patient does admit to not taking her metoprolol this morning  Work-up in the emergency department significant for leukocytosis and CT concerning for multifocal right sided pneumonia  Given 500 cc bolus and one-time dose of Rocephin in the emergency department  Physical exam mostly noncontributory due to large body habitus        Sputum cx - gram positive rods 3+, gram positive cocci in pairs 3+, gram negative rods 4+   Blood cx - [id number]     Procal downtrending   Lab Results   Component Value Date    PROCALCITONI 0 83 (H) 01/24/2023    PROCALCITONI 1 28 (H) 01/23/2023    PROCALCITONI 0 13 01/22/2023     WBC downtrending   Lab Results   Component Value Date    WBC 9 09 01/27/2023    WBC 9 60 01/26/2023    WBC 9 32 01/25/2023    WBC 11 07 (H) 01/24/2023    WBC 15 36 (H) 01/23/2023         PLAN  -completed 5 day treatment  No further antibiotics required

## 2023-01-27 NOTE — ASSESSMENT & PLAN NOTE
Lab Results   Component Value Date    EGFR 32 01/27/2023    EGFR 38 01/26/2023    EGFR 36 01/25/2023    CREATININE 1 58 (H) 01/27/2023    CREATININE 1 39 (H) 01/26/2023    CREATININE 1 45 (H) 01/25/2023   Baseline creatinine seems to range between 1 5-1 8      PLAN  Follow-up BMP in 1 week  -

## 2023-01-27 NOTE — ASSESSMENT & PLAN NOTE
Patient has history of open wound on right heel for which she follows up with wound care as outpatient  Family seems to be very supportive and helping keep the wound clean  Past medical history is significant for type 2 diabetes  Neurovascular exam of both feet appears to be intact  Wound on right heel is exposed but does not appear to be purulent or infected        Podiatry recs: Ulcer appears clinically stable and noninfected  Right heel dressed with Maxorb Ag and Allevyn foam pad  Can change dressing every other day with wound care  No podiatric interventions warranted during this admission    PLAN  Follow-up with podiatry

## 2023-01-27 NOTE — ASSESSMENT & PLAN NOTE
Recent Labs     01/25/23  0546 01/26/23  0643 01/27/23  0501   CREATININE 1 45* 1 39* 1 58*   EGFR 36 38 32     Estimated Creatinine Clearance: 39 8 mL/min (A) (by C-G formula based on SCr of 1 58 mg/dL (H))       Baseline 1 5  NATALIA secondary to sepsis and hypotension    PLAN  -Isolyte 100cc/hr d/c'ed 1/24  -Monitor I/O's  -Daily BMP  -Consider nephrology consultation if worsening kidney fxn

## 2023-01-27 NOTE — PLAN OF CARE
Problem: MOBILITY - ADULT  Goal: Maintain or return to baseline ADL function  Description: INTERVENTIONS:  -  Assess patient's ability to carry out ADLs; assess patient's baseline for ADL function and identify physical deficits which impact ability to perform ADLs (bathing, care of mouth/teeth, toileting, grooming, dressing, etc )  - Assess/evaluate cause of self-care deficits   - Assess range of motion  - Assess patient's mobility; develop plan if impaired  - Assess patient's need for assistive devices and provide as appropriate  - Encourage maximum independence but intervene and supervise when necessary  - Involve family in performance of ADLs  - Assess for home care needs following discharge   - Consider OT consult to assist with ADL evaluation and planning for discharge  - Provide patient education as appropriate  Outcome: Progressing  Goal: Maintains/Returns to pre admission functional level  Description: INTERVENTIONS:  - Perform BMAT or MOVE assessment daily    - Set and communicate daily mobility goal to care team and patient/family/caregiver     - Out of bed for toileting  - Record patient progress and toleration of activity level   Outcome: Progressing     Problem: Prexisting or High Potential for Compromised Skin Integrity  Goal: Skin integrity is maintained or improved  Description: INTERVENTIONS:  - Identify patients at risk for skin breakdown  - Assess and monitor skin integrity  - Assess and monitor nutrition and hydration status  - Monitor labs   - Assess for incontinence   - Turn and reposition patient  - Assist with mobility/ambulation  - Relieve pressure over bony prominences  - Avoid friction and shearing  - Provide appropriate hygiene as needed including keeping skin clean and dry  - Evaluate need for skin moisturizer/barrier cream  - Collaborate with interdisciplinary team   - Patient/family teaching  - Consider wound care consult   Outcome: Progressing     Problem: Nutrition/Hydration-ADULT  Goal: Nutrient/Hydration intake appropriate for improving, restoring or maintaining nutritional needs  Description: Monitor and assess patient's nutrition/hydration status for malnutrition  Collaborate with interdisciplinary team and initiate plan and interventions as ordered  Monitor patient's weight and dietary intake as ordered or per policy  Utilize nutrition screening tool and intervene as necessary  Determine patient's food preferences and provide high-protein, high-caloric foods as appropriate       INTERVENTIONS:  - Monitor oral intake, urinary output, labs, and treatment plans  - Assess nutrition and hydration status and recommend course of action  - Evaluate amount of meals eaten  - Assist patient with eating if necessary   - Allow adequate time for meals  - Recommend/ encourage appropriate diets, oral nutritional supplements, and vitamin/mineral supplements  - Order, calculate, and assess calorie counts as needed  - Recommend, monitor, and adjust tube feedings based on assessed needs  - Assess need for intravenous fluids  - Provide nutrition/hydration education as appropriate  - Include patient/family/caregiver in decisions related to nutrition  Outcome: Progressing     Problem: PAIN - ADULT  Goal: Verbalizes/displays adequate comfort level or baseline comfort level  Description: Interventions:  - Encourage patient to monitor pain and request assistance  - Assess pain using appropriate pain scale  - Administer analgesics based on type and severity of pain and evaluate response  - Implement non-pharmacological measures as appropriate and evaluate response  - Consider cultural and social influences on pain and pain management  - Notify physician/advanced practitioner if interventions unsuccessful or patient reports new pain  Outcome: Progressing     Problem: INFECTION - ADULT  Goal: Absence or prevention of progression during hospitalization  Description: INTERVENTIONS:  - Assess and monitor for signs and symptoms of infection  - Monitor lab/diagnostic results  - Monitor all insertion sites, i e  indwelling lines, tubes, and drains  - Monitor endotracheal if appropriate and nasal secretions for changes in amount and color  - [place] appropriate cooling/warming therapies per order  - Administer medications as ordered  - Instruct and encourage patient and family to use good hand hygiene technique  - Identify and instruct in appropriate isolation precautions for identified infection/condition  Outcome: Progressing     Problem: SAFETY ADULT  Goal: Maintain or return to baseline ADL function  Description: INTERVENTIONS:  -  Assess patient's ability to carry out ADLs; assess patient's baseline for ADL function and identify physical deficits which impact ability to perform ADLs (bathing, care of mouth/teeth, toileting, grooming, dressing, etc )  - Assess/evaluate cause of self-care deficits   - Assess range of motion  - Assess patient's mobility; develop plan if impaired  - Assess patient's need for assistive devices and provide as appropriate  - Encourage maximum independence but intervene and supervise when necessary  - Involve family in performance of ADLs  - Assess for home care needs following discharge   - Consider OT consult to assist with ADL evaluation and planning for discharge  - Provide patient education as appropriate  Outcome: Progressing  Goal: Maintains/Returns to pre admission functional level  Description: INTERVENTIONS:  - Perform BMAT or MOVE assessment daily    - Set and communicate daily mobility goal to care team and patient/family/caregiver     - Collaborate with rehabilitation services on mobility goals if consulted  - Out of bed for toileting  - Record patient progress and toleration of activity level   Outcome: Progressing  Goal: Patient will remain free of falls  Description: INTERVENTIONS:  -  Assess patient's ability to carry out ADLs; assess patient's baseline for ADL function and identify physical deficits which impact ability to perform ADLs (bathing, care of mouth/teeth, toileting, grooming, dressing, etc )  - Assess/evaluate cause of self-care deficits   - Assess range of motion  - Assess patient's mobility; develop plan if impaired  - Assess patient's need for assistive devices and provide as appropriate  - Encourage maximum independence but intervene and supervise when necessary  - Involve family in performance of ADLs  - Assess for home care needs following discharge   - Consider OT consult to assist with ADL evaluation and planning for discharge  - Provide patient education as appropriate  Outcome: Progressing     Problem: DISCHARGE PLANNING  Goal: Discharge to home or other facility with appropriate resources  Description: INTERVENTIONS:  - Identify barriers to discharge w/patient and caregiver  - Arrange for needed discharge resources and transportation as appropriate  - Identify discharge learning needs (meds, wound care, etc )  - Arrange for interpretive services to assist at discharge as needed  - Refer to Case Management Department for coordinating discharge planning if the patient needs post-hospital services based on physician/advanced practitioner order or complex needs related to functional status, cognitive ability, or social support system  Outcome: Progressing

## 2023-01-27 NOTE — ASSESSMENT & PLAN NOTE
Fall following syncopal episode at home, home pulse ox 88%  CT head and trauma series negative    PLAN  -Fall precautions   Tylenol 650  Every 8 hours as needed  -PT/OT - rec post-acute rehab  -[facility]

## 2023-01-27 NOTE — ASSESSMENT & PLAN NOTE
Patient presents after syncopal episode while sitting on the toilet  Past medical history significant for atrial fibrillation maintained on metoprolol and Eliquis, CHF on torsemide 40 mg daily, and type 2 diabetes  Patient says she went to the bathroom earlier this morning and was found on the bathroom floor by her granddaughter  Patient does not recall prodromal symptoms prior to passing out but says she may have been trying to stand up  She denies straining just before losing consciousness  Granddaughter reports O2 saturation at the time of finding the patient was approximately 88%  Patient found to be tachycardic and hypertensive on POA but she did not take her metoprolol this morning  EKG shows sinus tachycardia  Troponins are negative  Stress test from August 2022 shows an EF 70%  As of now there is an unclear etiology of why the patient lost consciousness, however tachyarrhythmia and orthostatic hypotension are certainly possibilities  She will need further work-up and observation      Ddx: orthostatic hypotension vs  low BP in s/o PNA infx vs  tachyarrhythmia     PLAN:  -stable for dcd  - aye stockings

## 2023-01-27 NOTE — ASSESSMENT & PLAN NOTE
Has occasional nose bleeds at home due to [facility]  1/26 is having a prolonged nose bleed with large clot despite compression and nasal spray/gel      -Resolved

## 2023-01-27 NOTE — ASSESSMENT & PLAN NOTE
Lab Results   Component Value Date    HGBA1C 11 0 (H) 01/23/2023       Recent Labs     01/26/23  1706 01/26/23  2104 01/27/23  0736 01/27/23  1126   POCGLU 339* 341* 95 274*       Blood Sugar Average: Last 72 hrs:  (P) 182 [id number]  Blood sugar appears to be poorly controlled on this admission  Patient is maintained on Toujeo 25 units before breakfast and 25 units before bedtime along with mealtime Humalog    Patient admits to only checking her blood sugars and taking mealtime insulin at breakfast     PLAN:  -HBA1C- 11%  -Has borderline low glucose in am- might have some Somoji effect  - Decreased basal insulin to 25 u  -Follow-up with endocrinology in 2 weeks

## 2023-01-27 NOTE — ASSESSMENT & PLAN NOTE
Secondary to community-acquired pneumonia  Patient presents with leukocytosis, tachycardia and suspected source of right sided multifocal pneumonia  Please refer to A&P under community-acquired pneumonia    Sputum cx (1/22): gram + rods 3+, gram positive cocci in pairs 3+, gram negative rods 4+     Lab Results   Component Value Date    WBC 9 09 01/27/2023    WBC 9 60 01/26/2023    WBC 9 32 01/25/2023    WBC 11 07 (H) 01/24/2023    WBC 15 36 (H) 01/23/2023    PROCALCITONI 0 83 (H) 01/24/2023    PROCALCITONI 1 28 (H) 01/23/2023    PROCALCITONI 0 13 01/22/2023     Lab Results   Component Value Date    BLOODCX No Growth After 4 Days  01/22/2023    BLOODCX No Growth After 4 Days   01/22/2023       PLAN  -Fluids normal saline 100 cc/h - d/c'ed 1/24  -Follow-up blood cultures   -Monitor temperature curve, CBC  -Follow-up blood cultures   -Monitor temperature curve, CBC  -IV Ceftriaxone 2g daily - 5 day course completed (1/22-1/26)  -Urine strep and legionella - needs to be collected

## 2023-01-27 NOTE — ASSESSMENT & PLAN NOTE
Maintained on Toprol-XL 50 mg daily and Eliquis 5 mg twice daily  Patient is presenting for syncopal episode  EKG on this admission shows sinus tachycardia with heart rate of 113, patient noted to be septic on this admission and she admits to not taking her metoprolol this morning      PLAN  Stable for discharge, continue medications as prescribed

## 2023-01-27 NOTE — CASE MANAGEMENT
Case Management Discharge Planning Note    Patient name Harinder JIMENES /S -01 MRN [id number]  : 1950 Date 2023       Current Admission Date: 2023  Current Admission Diagnosis:Pneumonia   Patient Active Problem List    Diagnosis Date Noted   • Epistaxis 2023   • Morbid obesity with BMI of 50 0-59 9, adult ([facility] ) 2023   • Fall 2023   • Sepsis ([facility] ) 2023   • Pneumonia 2023   • Syncope 2023   • COPD (chronic obstructive pulmonary disease) [facility] ) 2023   • Diabetes ([facility] ) 2023   • CHF (congestive heart failure) ([facility] ) 2023   • Atrial fibrillation ([facility] ) 2023   • CKD (chronic kidney disease) 2023   • Hypertension 2023   • Obstructive sleep apnea 2023   • Open wound 2023      LOS (days): 5  Geometric Mean LOS (GMLOS) (days): 5 00  Days to GMLOS:-0 1     OBJECTIVE:  Risk of Unplanned Readmission Score: 17 48         Current admission status: Inpatient   Preferred Pharmacy:   [facility]  No address on file      Primary Care Provider:  Remedios Myers MD    Primary Insurance: MEDICARE  Secondary Insurance: [employer]    DISCHARGE DETAILS:         Treatment Team Recommendation: Short Term Rehab  Discharge Destination Plan[de-identified] Short Term Rehab  Transport at Discharge : Wheelchair van  Dispatcher Contacted: Yes  Number/Name of Dispatcher: Volodymyr     ETA of Transport (Date): 23  ETA of Transport (Time): 1700     Transfer Mode: Wheelchair  Accompanied by: EMS personnel     IMM Given (Date):: 23  IMM Given to[de-identified] Patient          Accepting Facility Name, [address]  Receiving Facility/Agency Phone Number: [phone] ext [phone]  Facility/Agency Fax Number: [phone]

## 2023-01-27 NOTE — ASSESSMENT & PLAN NOTE
Maintained on albuterol every 6 hours as needed, daily fluticasone, Singulair nightly  Patient uses 2 L nasal cannula oxygen at nighttime      PLAN:  -Continue home maintenance medications- singulair  - stable for discharge

## 2023-01-27 NOTE — ASSESSMENT & PLAN NOTE
Patient has past medical history obstructive sleep apnea for which she is supposed to be using a CPAP at night  Patient reports poor compliance with CPAP due to discomfort with the mask  CPAP ordered for this admission       PLAN  Follow-up with sleep medicine at discharge  -Continue home 2L NC overnight during admission   -Add humidifier to NC to avoid nasal dryness and epistaxis

## 2023-02-09 ENCOUNTER — CONSULT (OUTPATIENT)
Dept: ENDOCRINOLOGY | Facility: CLINIC | Age: 73
End: 2023-02-09

## 2023-02-09 VITALS
WEIGHT: 255 LBS | OXYGEN SATURATION: 99 % | TEMPERATURE: 97.8 F | SYSTOLIC BLOOD PRESSURE: 130 MMHG | HEART RATE: 64 BPM | BODY MASS INDEX: 46.93 KG/M2 | HEIGHT: 62 IN | DIASTOLIC BLOOD PRESSURE: 82 MMHG

## 2023-02-09 DIAGNOSIS — E11.65 TYPE 2 DIABETES MELLITUS WITH HYPERGLYCEMIA, WITH LONG-TERM CURRENT USE OF INSULIN (HCC): Primary | ICD-10-CM

## 2023-02-09 DIAGNOSIS — Z79.4 TYPE 2 DIABETES MELLITUS WITH OTHER SPECIFIED COMPLICATION, WITH LONG-TERM CURRENT USE OF INSULIN (HCC): ICD-10-CM

## 2023-02-09 DIAGNOSIS — G47.33 OSA (OBSTRUCTIVE SLEEP APNEA): ICD-10-CM

## 2023-02-09 DIAGNOSIS — E78.2 MIXED HYPERLIPIDEMIA: ICD-10-CM

## 2023-02-09 DIAGNOSIS — E66.01 MORBID OBESITY WITH BMI OF 45.0-49.9, ADULT (HCC): Chronic | ICD-10-CM

## 2023-02-09 DIAGNOSIS — Z79.4 TYPE 2 DIABETES MELLITUS WITH HYPERGLYCEMIA, WITH LONG-TERM CURRENT USE OF INSULIN (HCC): Primary | ICD-10-CM

## 2023-02-09 DIAGNOSIS — E11.69 TYPE 2 DIABETES MELLITUS WITH OTHER SPECIFIED COMPLICATION, WITH LONG-TERM CURRENT USE OF INSULIN (HCC): ICD-10-CM

## 2023-02-09 PROBLEM — E11.9 DIABETES MELLITUS, TYPE 2 (HCC): Status: RESOLVED | Noted: 2022-02-28 | Resolved: 2023-02-09

## 2023-02-09 RX ORDER — FLASH GLUCOSE SENSOR
1 KIT MISCELLANEOUS
Qty: 2 EACH | Refills: 5 | Status: SHIPPED | OUTPATIENT
Start: 2023-02-09

## 2023-02-09 RX ORDER — FLASH GLUCOSE SCANNING READER
1 EACH MISCELLANEOUS CONTINUOUS
Qty: 1 EACH | Refills: 0 | Status: SHIPPED | OUTPATIENT
Start: 2023-02-09

## 2023-02-09 NOTE — PROGRESS NOTES
Continous glucose monitoring kar placement     Date/Time 2/9/2023 3:49 PM     Performed by  Miguel Walsh     Authorized by Darcy Griffith MD      Universal Protocol   Consent: Verbal consent obtained  Written consent obtained  Consent given by: patient  Timeout called at: 2/9/2023 3:49 PM   Patient understanding: patient states understanding of the procedure being performed  Patient consent: the patient's understanding of the procedure matches consent given  Procedure consent: procedure consent matches procedure scheduled  Relevant documents: relevant documents present and verified  Test results: test results available and properly labeled  Site marked: the operative site was marked  Radiology Images displayed and confirmed  If images not available, report reviewed: imaging studies not available  Patient identity confirmed: verbally with patient        Local anesthesia used: no     Anesthesia   Local anesthesia used: no     Sedation   Patient sedated: no        Specimen: no    Culture: no   Procedure Details   Procedure Notes: JIMMY MILLER ! [id number]  EXP 07 31 2023

## 2023-02-09 NOTE — PATIENT INSTRUCTIONS
INSULIN DOSAGE INSTRUCTIONS    Name: Ester Alexandra                        : 1950  MRN #: [id number]    Your Current Insulin  and dose is: Before Breakfast Before Lunch Before Evening Meal Bedtime     Humalog Insulin   6u   6u   6u    Regular, Apidra, Humalog orNovolog Sliding Scale:   <80              151-200 + 1 +1 +1    201-250 +2 +2 +2 +   251-300 +3 +3 +3 +   301-350 +4 +4 +4 +   >350 +5 +5 +5 +       Toujeo 30u   30u     Additional Instructions:   Please test your blood sugar:  _4_ Times per day  X_ Before Breakfast                _ Alternate Testing  X_ Before Lunch                _ 2 Hours After  Meal  X_ Before Evening Meal               _ 3 a m   x_ Before Bedtime Snack     Target Blood sugar range _90_to _180__  Call if your Verona Coyle MD  blood sugar is less than _60_ or greater than _400__      Today's Date: 2023

## 2023-02-09 NOTE — PROGRESS NOTES
New consult note      CC: Type 2 Diabetes    History of Present Illness:   70-year-old female with type 2 diabetes, HTN, HLD, HFpEF, A-fib, CKD 3 EGFR 32, KATRINA, COPD, ambulatory dysfunction, morbid obesity BMI 46 and vitamin D deficiency  She was referred for diabetes management after recent hospitalization  She has type 2 diabetes for proximately 15 years  She has had progressive weight gain over last 20 years  She was initially on oral agents but was transition to insulin therapy  She was recently hospitalized during which time she was given basal bolus insulin therapy at discharge  She then went to rehab  She is an ex nurse  Home blood glucose monitoring: Checks 3-4 times a day  Before breakfast: 70 to 110 mg/dL  Before lunch: 120- 200 mg/dL  Before dinner: 120-200 mg deciliter  Bedtime: 120-200 mg/dL  Hypoglycemia: Reports some fasting hyperglycemia symptoms but did not record a blood glucose less than 70 mg/dL    Current meds:   Toujeo 36 units every 12 hours  Humalog 2-12u on a scale    Opthamology:   Podiatry:   vaccination:   Dental:  Pancreatitis: No    Ace/ARB: No  Statin: Crestor  Thyroid issues: No    Patient Active Problem List   Diagnosis   • Asthma   • Morbid obesity with BMI of 45 0-49 9, adult ([facility] [address] )   • Lower leg edema   • Paroxysmal atrial fibrillation (HCC)   • Mixed hyperlipidemia   • Anemia   • Essential hypertension   • PONV (postoperative nausea and vomiting)   • Diabetes mellitus, type 2 (HCC)   • Gastroesophageal reflux disease   • Nephrolithiasis   • Arthritis   • S/P total knee replacement, right   • On continuous oral anticoagulation - eliquis   • Status post reverse total replacement of left shoulder   • Stage 3 chronic kidney disease (HCC)   • Peripheral venous insufficiency   • Post-herpetic polyneuropathy   • Raynaud's disease   • Lung nodule   • Chronic diastolic heart failure (HCC)   • Pulmonary nodules   • Lump of left breast   • Pressure injury of deep tissue of sacral region   • Morbid obesity ([place] [address] )   • Obesity hypoventilation syndrome (HCC)   • KATRINA (obstructive sleep apnea)   • Elevated serum creatinine   • Chronic respiratory failure with hypoxia and hypercapnia (HCC)   • Platelets decreased (HCC)   • PSVT (paroxysmal supraventricular tachycardia) ([facility])   • Rash   • Impaired mobility and ADLs   • Pressure injury of sacral region, stage 2 ([facility])   • Blister of right heel   • Heel blister   • Chronic obstructive pulmonary disease, unspecified COPD type ([facility])   • Fall   • Sepsis ([place] [address] )   • Pneumonia   • Syncope   • COPD (chronic obstructive pulmonary disease) ([facility])   • Diabetes (HCC)   • CHF (congestive heart failure) ([facility])   • Atrial fibrillation ([facility])   • CKD (chronic kidney disease)   • Hypertension   • Obstructive sleep apnea   • Open wound   • Morbid obesity with BMI of 50 0-59 9, adult ([place] [address] )   • Epistaxis     Past Medical History:   Diagnosis Date   • SEBASTIAN (acute kidney injury) ([place] [address] ) 1/23/2023   • Anemia    • Asthma    • Chronic kidney disease    • COPD (chronic obstructive pulmonary disease) ([place] [address] )    • COVID-19     January 2022   • Diabetes mellitus ([place] [address] )    • GERD (gastroesophageal reflux disease)    • Hyperlipidemia    • Hypertension    • PONV (postoperative nausea and vomiting)    • SVT (supraventricular tachycardia) ([facility])       Past Surgical History:   Procedure Laterality Date   • APPENDECTOMY     • BREAST BIOPSY Right     years ago when she was 23   • CYSTOSCOPY W/ LASER LITHOTRIPSY Left 07/12/2016    Procedure: CYSTOSCOPY URETEROSCOPY WITH LITHOTRIPSY HOLMIUM LASER, RETROGRADE PYELOGRAM AND INSERTION STENT URETERAL;  Surgeon: Charli Rodriguez MD;  Location: [address] [place] [address];  Service:    • DILATION AND CURETTAGE OF UTERUS     • IR THORACENTESIS  03/18/2022   • JOINT REPLACEMENT      right knee   • KNEE ARTHROPLASTY Right    • NE ARTHROPLASTY GLENOHUMERAL JOINT TOTAL SHOULDER Left 03/01/2022    Procedure: ARTHROPLASTY SHOULDER REVERSE;  Surgeon: Keith Sweet MD; Location: [facility];  Service: Orthopedics   • RI CYSTO BLADDER W/URETERAL CATHETERIZATION Left 2016    Procedure: CYSTOSCOPY RETROGRADE PYELOGRAM WITH INSERTION STENT URETERAL, left;  Surgeon: Zenia Alford MD;  Location: [facility];  Service: Urology   • SHOULDER ARTHROTOMY Left       Family History   Problem Relation Age of Onset   • Heart disease Mother    • Diabetes Father    • Thyroid cancer Sister    • Emphysema Maternal Grandmother    • Heart disease Family      Social History     Tobacco Use   • Smoking status: Former     Packs/day:  00     Years: 20 00     Pack years: 20      Types: Cigarettes     Quit date: 1996     Years since quittin 6   • Smokeless tobacco: Never   Substance Use Topics   • Alcohol use: Not Currently     Comment: rarely     Allergies   Allergen Reactions   • Penicillins Hives   • Moxifloxacin Other (See Comments)     unknown   • Percocet [Oxycodone-Acetaminophen] Other (See Comments)     GI upset   • Zinc Acetate Other (See Comments)     unknown   • Penicillins Hives   • Asa [Aspirin] GI Intolerance   • Indocin [Indomethacin] Other (See Comments)     Made patient "loopy"   • Other Other (See Comments)     unknown         Current Outpatient Medications:   •  acetaminophen (TYLENOL) 325 mg tablet, Take 650 mg by mouth every 6 (six) hours as needed for mild pain  , Disp: , Rfl:   •  albuterol (PROVENTIL HFA,VENTOLIN HFA) 90 mcg/act inhaler, Inhale 2 puffs every 6 (six) hours as needed for wheezing, Disp: 8 g, Rfl: 4  •  albuterol (PROVENTIL HFA,VENTOLIN HFA) 90 mcg/act inhaler, Inhale 2 puffs every 6 (six) hours as needed for wheezing, Disp: , Rfl:   •  ammonium lactate (LAC-HYDRIN) 12 % lotion, APPLY TOPICALLY TO AFFECTED AREAS twice a day, Disp: , Rfl:   •  B Complex-C-Folic Acid (triphrocaps) 1 MG CAPS, take 1 capsule by mouth once daily, Disp: 30 capsule, Rfl: 5  •  docusate sodium (COLACE) 100 mg capsule, Take 100 mg by mouth in the morning, Disp: , Rfl:   • Eliquis 5 MG, take 1 tablet by mouth twice a day every morning and evening, Disp: 180 tablet, Rfl: 1  •  fluticasone (FLONASE) 50 mcg/act nasal spray, 1 spray into each nostril daily, Disp: , Rfl: 0  •  glucose blood test strip, Use 1 each 2 (two) times a day Use one 2 times daily, Disp: 200 strip, Rfl: 5  •  insulin glargine (Toujeo SoloStar) 300 units/mL CONCENTRATED U-300 injection pen (1-unit dial), Inject 36 Units under the skin every 12 (twelve) hours, Disp: , Rfl:   •  insulin lispro (HumaLOG KwikPen) 100 units/mL injection pen, 3 times with meal according to sliding scale - >Blood Glucose 150 - 199: 2 Unit of Insulin, Blood Glucose 200 - 249: 4 Units of Insulin, Blood Glucose 250 - 299: 6 Units of Insulin, Blood Glucose 300 - 349: 8 Units of Insulin, Blood Glucose 350 - 399: 10 Units of Inuslin,Blood Glucose greater than or equal to 400: 12 Units of Insulin-please contact your physician, Disp: 15 mL, Rfl: 0  •  Insulin Pen Needle (BD Pen Needle Pilar 2nd Gen) 32G X 4 MM MISC, For use with insulin pen   Pharmacy may dispense brand covered by insurance , Disp: 100 each, Rfl: 0  •  Insulin Pen Needle (NovoFine Autocover) 30G X 8 MM MISC, Inject under the skin daily, Disp: 100 each, Rfl: 3  •  Insulin Pen Needle 30G X 8 MM MISC, 2 (two) times a day, Disp: , Rfl:   •  Lancets (onetouch ultrasoft) lancets, Use once daily, Disp: 100 each, Rfl: 2  •  meclizine (ANTIVERT) 25 mg tablet, Take 1 tablet (25 mg total) by mouth every 8 (eight) hours, Disp: 8 tablet, Rfl: 0  •  metoprolol succinate (TOPROL-XL) 50 mg 24 hr tablet, Take 50 mg by mouth daily, Disp: , Rfl:   •  montelukast (SINGULAIR) 10 mg tablet, Take 10 mg by mouth daily at bedtime, Disp: , Rfl:   •  ondansetron (ZOFRAN-ODT) 4 mg disintegrating tablet, Take 1 tablet (4 mg total) by mouth every 6 (six) hours as needed for nausea or vomiting, Disp: 20 tablet, Rfl: 0  •  pregabalin (LYRICA) 100 mg capsule, take 1 capsule by mouth twice a day, Disp: 180 capsule, Rfl: 1  •  rosuvastatin (CRESTOR) 10 MG tablet, take 1 tablet by mouth once daily, Disp: 90 tablet, Rfl: 1  •  torsemide (DEMADEX) 20 mg tablet, Take 2 tablets in the AM, Disp: 90 tablet, Rfl: 1  •  apixaban (ELIQUIS) 5 mg, Take 5 mg by mouth 2 (two) times a day (Patient not taking: Reported on 2/9/2023), Disp: , Rfl:   •  benzonatate (TESSALON) 200 MG capsule, Take 1 capsule (200 mg total) by mouth 3 (three) times a day as needed for cough (Patient not taking: Reported on 2/9/2023), Disp: 20 capsule, Rfl: 0  •  fluticasone (FLONASE) 50 mcg/act nasal spray, 1 spray into each nostril daily (Patient not taking: Reported on 2/9/2023), Disp: , Rfl:   •  Insulin Pen Needle 31G X 8 MM MISC, Use daily Inject under the skin, Disp: 100 each, Rfl: 2  •  metoprolol succinate (TOPROL-XL) 50 mg 24 hr tablet, Take 1 tablet (50 mg total) by mouth daily (Patient not taking: Reported on 2/9/2023), Disp: 30 tablet, Rfl: 3  •  montelukast (SINGULAIR) 10 mg tablet, Take 10 mg by mouth daily   (Patient not taking: Reported on 2/9/2023), Disp: , Rfl:   •  pregabalin (LYRICA) 100 mg capsule, Take 100 mg by mouth 2 (two) times a day (Patient not taking: Reported on 2/9/2023), Disp: , Rfl:   •  rosuvastatin (CRESTOR) 10 MG tablet, Take 10 mg by mouth daily (Patient not taking: Reported on 2/9/2023), Disp: , Rfl:   •  senna-docusate sodium (SENOKOT S) 8 6-50 mg per tablet, Take 1 tablet by mouth 2 (two) times a day for 7 days, Disp: 14 tablet, Rfl: 0  •  torsemide (DEMADEX) 20 mg tablet, Take 20 mg by mouth 2 (two) times a day (Patient not taking: Reported on 2/9/2023), Disp: , Rfl:   •  Toujeo SoloStar 300 units/mL CONCENTRATED U-300 injection pen (1-unit dial), Inject 35 Units under the skin daily at bedtime (Patient not taking: Reported on 2/9/2023), Disp: 4 5 mL, Rfl: 3    Review of Systems   HENT: Negative  Eyes: Negative  Respiratory: Negative  Cardiovascular: Negative  Gastrointestinal: Negative  Endocrine: Negative  Musculoskeletal: Negative  Skin: Negative  Allergic/Immunologic: Negative  Neurological: Negative  Hematological: Negative  Psychiatric/Behavioral: Negative  Physical Exam:  Body mass index is 46 64 kg/m²  /82 (BP Location: Left arm, Patient Position: Sitting, Cuff Size: Large)   Pulse 64   Temp 97 8 °F (36 6 °C) (Tympanic)   Ht 5' 2" (1 575 m)   Wt 116 kg (255 lb)   SpO2 99%   BMI 46 64 kg/m²    Vitals:    02/09/23 1341   Weight: 116 kg (255 lb)        Physical Exam  Constitutional:       General: She is not in acute distress  Appearance: She is well-developed  She is not ill-appearing  HENT:      Head: Normocephalic and atraumatic  Nose: Nose normal       Mouth/Throat:      Pharynx: Oropharynx is clear  Eyes:      Extraocular Movements: Extraocular movements intact  Conjunctiva/sclera: Conjunctivae normal    Neck:      Thyroid: No thyromegaly  Cardiovascular:      Rate and Rhythm: Normal rate  Pulmonary:      Effort: Pulmonary effort is normal    Musculoskeletal:         General: No deformity  Cervical back: Normal range of motion  Skin:     Capillary Refill: Capillary refill takes less than 2 seconds  Coloration: Skin is not pale  Findings: No rash  Neurological:      Mental Status: She is alert and oriented to person, place, and time     Psychiatric:         Behavior: Behavior normal          Labs:   Lab Results   Component Value Date    HGBA1C 11 0 (H) 01/23/2023       Lab Results   Component Value Date    [id number] 2 481 01/22/2023       Lab Results   Component Value Date    CREATININE 1 58 (H) 01/27/2023    CREATININE 1 39 (H) 01/26/2023    CREATININE 1 45 (H) 01/25/2023    BUN 43 (H) 01/27/2023    K 4 4 01/27/2023    CL 97 01/27/2023    CO2 36 (H) 01/27/2023     eGFR   Date Value Ref Range Status   01/27/2023 32 ml/min/1 73sq m Final       Lab Results   Component Value Date    ALT 17 01/25/2023    AST 27 01/25/2023    ALKPHOS 79 01/25/2023       Lab Results   Component Value Date    CHOLESTEROL 155 12/20/2022    CHOLESTEROL 141 07/20/2022    CHOLESTEROL 104 03/10/2022     Lab Results   Component Value Date    HDL 42 (L) 12/20/2022    HDL 35 (L) 07/20/2022    HDL 30 (L) 03/10/2022     Lab Results   Component Value Date    TRIG 259 (H) 12/20/2022    TRIG 376 (H) 07/20/2022    TRIG 132 03/10/2022     Lab Results   Component Value Date    [place] 113 12/20/2022    [place] 106 07/20/2022         Impression:  1  Type 2 diabetes mellitus with hyperglycemia, with long-term current use of insulin ([facility] [address] )    2  Type 2 diabetes mellitus with other specified complication, with long-term current use of insulin ([facility] [address] )    3  KATRINA (obstructive sleep apnea)    4  Morbid obesity with BMI of 45 0-49 9, adult ([facility] [address] )    5  Mixed hyperlipidemia         Plan:    Diagnoses and all orders for this visit:    Type 2 diabetes mellitus with hyperglycemia, with long-term current use of insulin ([facility] [address] )  She is uncontrolled with a recent A1c of 11%  Goal is 7%  Her diabetes is complicated with severe metabolic syndrome, COPD, KATRINA, CKD and heart failure  Today we discussed all aspects of diabetes including pathophysiology, risk factors, complications, SAGM, CGM, diet, lifestyle modifications, medical fitness training, diabetes education, goals of therapy, follow up needs and medications including insulin, metformin and GLP1 agonists  Advised to maintain goal blood sugars 90-180mg/dL  Given low EGFR, will refrain from using metformin or Jardiance  We will start a GLP-1 agonist today  Insulin dose was adjusted to 30 units every 12 hours and Humalog 6 units 3 times daily AC +1 unit per 50 above 150 mg/dL  We will use a professional CGM to get data over the next 2 weeks to further titrate therapy  She will benefit from a personal CGM and she does qualify based on Medicare criteria  Patient requires multiple (4times) capillary fingerstick glucose tests daily   Patient requires multiple insulin injections (4 times) daily  patient changes insulin dosing based on his capillary fingersticks  Patient would therefore qualify for a continuous glucose monitor  Follow-up in 6 weeks    -     Continuous Blood Gluc  (FreeStyle Jett 2 Painter) NORM; Use 1 Units continuous  -     Continuous Blood Gluc Sensor (FreeStyle Dawn 2 Sensor) MISC; Use 1 Units every 14 (fourteen) days  -     Hemoglobin A1C; Future  -     Microalbumin / creatinine urine ratio; Future  -     semaglutide, 0 25 or 0 5 mg/dose, (Ozempic, 0 25 or 0 5 MG/DOSE,) 2 mg/1 5 mL injection pen; Use 0 25mg weekly for 4 weeks and then 0 5mg weekly  -     Continous glucose monitoring dawn placement; Future  -     Continous glucose monitoring dawn intrepretation; Future    KATRINA (obstructive sleep apnea)  She would benefit from a sleep study and treatment for sleep apnea  Morbid obesity with BMI of 45 0-49 9, adult ([facility] [address] )  Today we discussed all aspects of obesity and metabolism including pathophysiology, risk factors, complications, goal of sustaining weight loss long term, usual propensity to regain weight with short term approaches, follow up needs and medications - efficacy and limitations  Briefly discussed bariatric surgery  Diet: carb controlled diet <1500cal/day/ VLCD, 64oz fluids/day   lifestyle modifications: intermittent fasting and 10,000 steps/day  medical fitness training: muscle building  education: nutrition input    Advised diet and lifestyle modifications  Mixed hyperlipidemia        I have spent 56 minutes with patient today in which greater than 50% of this time was spent in counseling/coordination of care  Discussed with the patient and all questioned fully answered  She will call me if any problems arise      Educated/ Counseled patient on diagnostic test results, prognosis, risk vs benefit of treatment options, importance of treatment compliance, healthy life and lifestyle choices        [address]

## 2023-02-10 ENCOUNTER — TELEPHONE (OUTPATIENT)
Dept: ENDOCRINOLOGY | Facility: CLINIC | Age: 73
End: 2023-02-10

## 2023-02-10 NOTE — TELEPHONE ENCOUNTER
Pt left v/m that medication that was prescribed yesterday is not covered by her insurance  Would like a call back

## 2023-02-14 ENCOUNTER — OFFICE VISIT (OUTPATIENT)
Dept: INTERNAL MEDICINE CLINIC | Facility: CLINIC | Age: 73
End: 2023-02-14

## 2023-02-14 VITALS
BODY MASS INDEX: 47.66 KG/M2 | TEMPERATURE: 98 F | HEIGHT: 62 IN | SYSTOLIC BLOOD PRESSURE: 144 MMHG | HEART RATE: 68 BPM | DIASTOLIC BLOOD PRESSURE: 78 MMHG | WEIGHT: 259 LBS | OXYGEN SATURATION: 100 %

## 2023-02-14 DIAGNOSIS — E11.65 TYPE 2 DIABETES MELLITUS WITH HYPERGLYCEMIA, WITHOUT LONG-TERM CURRENT USE OF INSULIN (HCC): ICD-10-CM

## 2023-02-14 DIAGNOSIS — B02.23 POST-HERPETIC POLYNEUROPATHY: ICD-10-CM

## 2023-02-14 DIAGNOSIS — I50.32 CHRONIC DIASTOLIC HEART FAILURE (HCC): ICD-10-CM

## 2023-02-14 DIAGNOSIS — I10 PRIMARY HYPERTENSION: ICD-10-CM

## 2023-02-14 DIAGNOSIS — J96.12 CHRONIC RESPIRATORY FAILURE WITH HYPOXIA AND HYPERCAPNIA (HCC): ICD-10-CM

## 2023-02-14 DIAGNOSIS — I10 ESSENTIAL HYPERTENSION: ICD-10-CM

## 2023-02-14 DIAGNOSIS — I47.1 PSVT (PAROXYSMAL SUPRAVENTRICULAR TACHYCARDIA) (HCC): ICD-10-CM

## 2023-02-14 DIAGNOSIS — I87.2 PERIPHERAL VENOUS INSUFFICIENCY: ICD-10-CM

## 2023-02-14 DIAGNOSIS — J96.11 CHRONIC RESPIRATORY FAILURE WITH HYPOXIA AND HYPERCAPNIA (HCC): ICD-10-CM

## 2023-02-14 DIAGNOSIS — R91.1 LUNG NODULE: ICD-10-CM

## 2023-02-14 DIAGNOSIS — D69.6 PLATELETS DECREASED (HCC): ICD-10-CM

## 2023-02-14 DIAGNOSIS — J44.9 CHRONIC OBSTRUCTIVE PULMONARY DISEASE, UNSPECIFIED COPD TYPE (HCC): ICD-10-CM

## 2023-02-14 DIAGNOSIS — I50.32 CHRONIC DIASTOLIC CONGESTIVE HEART FAILURE (HCC): ICD-10-CM

## 2023-02-14 DIAGNOSIS — J45.909 MILD ASTHMA WITHOUT COMPLICATION, UNSPECIFIED WHETHER PERSISTENT: ICD-10-CM

## 2023-02-14 DIAGNOSIS — E78.2 MIXED HYPERLIPIDEMIA: Primary | ICD-10-CM

## 2023-02-14 DIAGNOSIS — L89.152 PRESSURE INJURY OF SACRAL REGION, STAGE 2 (HCC): ICD-10-CM

## 2023-02-14 DIAGNOSIS — I48.0 PAROXYSMAL ATRIAL FIBRILLATION (HCC): ICD-10-CM

## 2023-02-14 DIAGNOSIS — D64.9 ANEMIA, UNSPECIFIED TYPE: ICD-10-CM

## 2023-02-14 DIAGNOSIS — N18.32 STAGE 3B CHRONIC KIDNEY DISEASE (HCC): ICD-10-CM

## 2023-02-14 DIAGNOSIS — G47.33 OSA (OBSTRUCTIVE SLEEP APNEA): ICD-10-CM

## 2023-02-14 DIAGNOSIS — S90.829A: ICD-10-CM

## 2023-02-14 DIAGNOSIS — J18.9 PNEUMONIA OF BOTH LUNGS DUE TO INFECTIOUS ORGANISM, UNSPECIFIED PART OF LUNG: ICD-10-CM

## 2023-02-14 PROBLEM — T14.8XXA OPEN WOUND: Status: RESOLVED | Noted: 2023-01-22 | Resolved: 2023-02-14

## 2023-02-14 PROBLEM — R04.0 EPISTAXIS: Status: RESOLVED | Noted: 2023-01-26 | Resolved: 2023-02-14

## 2023-02-14 PROBLEM — A41.9 SEPSIS (HCC): Status: RESOLVED | Noted: 2023-01-22 | Resolved: 2023-02-14

## 2023-02-14 NOTE — PROGRESS NOTES
Name: Minda Machuca      : 1950      MRN: [id number]  Encounter Provider: Guerry Mohs, MD  Encounter Date: 2023   Encounter department: [facility] INTERNAL MEDICINE    Assessment & Plan     1  Mixed hyperlipidemia  -     CBC; Future; Expected date: 2023  -     Comprehensive metabolic panel; Future; Expected date: 2023    2  Chronic respiratory failure with hypoxia and hypercapnia (HCC)  -     CBC; Future; Expected date: 2023  -     Comprehensive metabolic panel; Future; Expected date: 2023  -     Comprehensive metabolic panel; Future; Expected date: 2023  -     CBC and differential; Future; Expected date: 2023    3  Platelets decreased ([address] )  -     CBC; Future; Expected date: 2023  -     Comprehensive metabolic panel; Future; Expected date: 2023  -     Comprehensive metabolic panel; Future; Expected date: 2023  -     CBC and differential; Future; Expected date: 2023    4  Type 2 diabetes mellitus with hyperglycemia, without long-term current use of insulin (HCC)  -     CBC; Future; Expected date: 2023  -     Comprehensive metabolic panel; Future; Expected date: 2023  -     Comprehensive metabolic panel; Future; Expected date: 2023  -     CBC and differential; Future; Expected date: 2023    5  Mild asthma without complication, unspecified whether persistent  -     Comprehensive metabolic panel; Future; Expected date: 2023  -     CBC and differential; Future; Expected date: 2023    6  KATRINA (obstructive sleep apnea)    7  Chronic obstructive pulmonary disease, unspecified COPD type ([facility] )  -     CBC; Future; Expected date: 2023  -     Comprehensive metabolic panel; Future; Expected date: 2023  -     Comprehensive metabolic panel; Future; Expected date: 2023  -     CBC and differential; Future; Expected date: 2023    8   Pneumonia of both lungs due to infectious organism, unspecified part of lung  -     Comprehensive metabolic panel; Future; Expected date: 02/25/2023  -     CBC and differential; Future; Expected date: 02/25/2023    9  Essential hypertension    10  Paroxysmal atrial fibrillation (HCC)    11  Peripheral venous insufficiency    12  Chronic diastolic heart failure ([facility] )    13  PSVT (paroxysmal supraventricular tachycardia) ([facility] )    14  Chronic diastolic congestive heart failure (HCC)  -     Comprehensive metabolic panel; Future; Expected date: 02/25/2023  -     CBC and differential; Future; Expected date: 02/25/2023    15  Primary hypertension    16  Post-herpetic polyneuropathy    17  Stage 3b chronic kidney disease ([place] [address] )  -     CBC; Future; Expected date: 02/27/2023  -     Comprehensive metabolic panel; Future; Expected date: 02/27/2023  -     Comprehensive metabolic panel; Future; Expected date: 02/25/2023  -     CBC and differential; Future; Expected date: 02/25/2023    18  Anemia, unspecified type  -     CBC; Future; Expected date: 02/27/2023  -     Comprehensive metabolic panel; Future; Expected date: 02/27/2023    19  Lung nodule    20  Pressure injury of sacral region, stage 2 ([place] [address] )    21  Blister of heel, unspecified laterality, initial encounter  In sum well-controlled  Atrial fibrillation pulse regular camacho pneumonia resolved  Will need follow-up surveillance chest x-ray which is already scheduled for March 7  No pulmonary symptoms  Off antibiotic  No further dizziness  She thinks that she is getting some rest from and well  Recommend to stop it  No postural symptoms  Hypertension well controlled  Diabetes seen by endocrinologist and not were reviewed  Patient has been started on Ozempic starting today  Toujeo is decreased 30 units twice a day and Humalog will be 6 units with each meal     Obstructive sleep apnea continue nasal oxygen at nighttime could not tolerate CPAP,  COPD fair  Well-controlled  CHF compensated    Edema chronic  Atrial fibrillation controlled rate  Pulse regular  Neuropathy fair  CKD level 3 baseline needs follow-up lab data anemia baseline needs follow-up CBC  Pressure ulcer on the sacrum resolved  Pressure ulcer/blister on the heel resolved  Lung nodule for follow-up in the future  Patient to be seen by pulmonologist cardiologist multiple other problem  Patient is  Obesity remains unchanged  Extensive duration of time were spent  Time spent is greater than 45 minutes  BMI Counseling: Body mass index is 47 37 kg/m²  Follow-up plan was not completed due to elderly patient (74 years old) where weight reduction/weight gain would complicate underlying health condition such as: illness or physical disability  Falls Plan of Care: balance, strength, and gait training instructions were provided  Subjective     TCM Call     Date and time call was made  11/7/2022  1:28 PM    Hospital care reviewed  Records reviewed    Patient was hospitialized at  [address] [place] [address]    Date of Admission  10/31/22    Date of discharge  11/04/22    Diagnosis  Covid, Hypoxia, CKD    Disposition  Home health services; Home  Reviewed wound care on bedsores   on back side  Repositioning every two hours    Were the patients medications reviewed and updated  Yes    Current Symptoms  None; Weakness    Weakness severity  Mild  She has PT coming  TCM Call     Post hospital issues  Reduced activity    Should patient be enrolled in anticoag monitoring? No    Scheduled for follow up?   Yes    Patients specialists  Pulmonlolgist    Cardiologist name  Dr Cristian Simpson    Cardiologist contact #  [phone]    [address] name  Ciara Hurtado contact #  [phone]    Endocrinologist name  [phone]    Nephrologist name  Dr Kathy Flores    Nephrologist contact #  [phone]    Did you obtain your prescribed medications  Yes    Do you need help managing your prescriptions or medications  No    Is transportation to your appointment needed  No    Specify why  Pt does not drive, She relies on her neice to transport her  Her neice is her caregiver  I have advised the patient to call PCP with any new or worsening symptoms    Patricia Hagen, Practice Administrator II    Living Arrangements  Family members    Support System  Family    The type of support provided  Physical    Do you have social support  Yes, as much as I need    Are you recieving any outpatient services  No    Are you recieving home care services  Yes    Types of home care services  Home PT; Home RT; Nurse visit    Are you using any community resources  No    Current waiver services  No    Have you fallen in the last 12 months  Yes    How many times  Was hospitalized and went to rehab    Interperter language line needed  No    Counseling  Patient    Counseling topics  Importance of RX compliance; Activities of daily   living  Repsitioning every 2 hours    Comments  Asked pt to get an appt with Cardiology sooner  Not to wait   unti her appt in 1 month  Her TCM visit with PCP is here in 2days   considering that she is high risk  1/12/2023: Mammogram: Report reviewed with the patient and family member  Patient was supposed to get follow-up right diagnostic mammogram, ultrasound however she was in the hospital so it was not done  We will notify coordinator at radiology department to reschedule now that she is at home  Patient will proceed and what will work with them  Symptom-free  Recommend to get breast examination done with one of the gynecologist or breast surgeon  Of care were reviewed  Patient was hospitalized on 1/22/2023 and discharged on 1/27/2027  PneumoniaPneumonia  Assessment & Plan  Patient presents after syncopal episode while being on the toilet  Past medical history significant for COPD, CHF, obstructive sleep apnea with poor CPAP compliance  Granddaughter found patient unconscious on the floor    O2 sat at that time was reportedly 88%  Patient reports not feeling well for the past several days complaining of productive cough with green sputum  Patient denies fever, chills or sweats  No chest pain or complaints of being unusually short of breath reported  She is found to be tachycardic on arrival to the emergency department, patient does admit to not taking her metoprolol this morning  Work-up in the emergency department significant for leukocytosis and CT concerning for multifocal right sided pneumonia  Given 500 cc bolus and one-time dose of Rocephin in the emergency department  Physical exam mostly noncontributory due to large body habitus         Sputum cx - gram positive rods 3+, gram positive cocci in pairs 3+, gram negative rods 4+   Blood cx - [id number]      Procal downtrending   Lab Results  Component Value Date    PROCALCITONI 0 83 (H) 01/24/2023    PROCALCITONI 1 28 (H) 01/23/2023    PROCALCITONI 0 13 01/22/2023     WBC downtrending   Lab Results  Component Value Date    WBC 9 09 01/27/2023    WBC 9 60 01/26/2023    WBC 9 32 01/25/2023    WBC 11 07 (H) 01/24/2023    WBC 15 36 (H) 01/23/2023           PLAN  -completed 5 day treatment  No further antibiotics required        Sepsis ([facility] )  Assessment & Plan  Secondary to community-acquired pneumonia  Patient presents with leukocytosis, tachycardia and suspected source of right sided multifocal pneumonia  Please refer to A&P under community-acquired pneumonia     Sputum cx (1/22): gram + rods 3+, gram positive cocci in pairs 3+, gram negative rods 4+      Lab Results  Component Value Date    WBC 9 09 01/27/2023    WBC 9 60 01/26/2023    WBC 9 32 01/25/2023    WBC 11 07 (H) 01/24/2023    WBC 15 36 (H) 01/23/2023    PROCALCITONI 0 83 (H) 01/24/2023    PROCALCITONI 1 28 (H) 01/23/2023    PROCALCITONI 0 13 01/22/2023     Lab Results  Component Value Date    BLOODCX No Growth After 4 Days  01/22/2023    BLOODCX No Growth After 4 Days   01/22/2023        PLAN  -Fluids normal saline 100 cc/h - d/c'ed 1/24  -Follow-up blood cultures   -Monitor temperature curve, CBC  -Follow-up blood cultures   -Monitor temperature curve, CBC  -IV Ceftriaxone 2g daily - 5 day course completed (1/22-1/26)  -Urine strep and legionella - needs to be collected            Epistaxis  Assessment & Plan  Has occasional nose bleeds at home due to [address]  1/26 is having a prolonged nose bleed with large clot despite compression and nasal spray/gel       -Resolved     Morbid obesity with BMI of 50 0-59 9, adult [facility])  Assessment & Plan  PLAN  Follow up with PCP for weight loss management at [place]     Open wound  Assessment & Plan  Patient has history of open wound on right heel for which she follows up with wound care as outpatient  Family seems to be very supportive and helping keep the wound clean  Past medical history is significant for type 2 diabetes  Neurovascular exam of both feet appears to be intact  Wound on right heel is exposed but does not appear to be purulent or infected        Podiatry recs: Ulcer appears clinically stable and noninfected  Right heel dressed with Maxorb Ag and Allevyn foam pad  Can change dressing every other day with wound care  No podiatric interventions warranted during this admission     PLAN  Follow-up with podiatry     Obstructive sleep apnea  Assessment & Plan  Patient has past medical history obstructive sleep apnea for which she is supposed to be using a CPAP at night  Patient reports poor compliance with CPAP due to discomfort with the mask    CPAP ordered for this admission       PLAN  Follow-up with sleep medicine at discharge  -Continue home 2L NC overnight during admission   -Add humidifier to NC to avoid nasal dryness and epistaxis            Hypertension  Assessment & Plan  Home meds include Toprol-XL 50 mg and torsemide 40 mg daily  Hold above meds for now- 1/23/2023  metoprolol 50mg daily restarted 1/24      PLAN   -Continue home metoprolol 50mg daily and Torsemide 20mg daily         CKD (chronic kidney disease)  Assessment & Plan  Lab Results  Component Value Date    EGFR 32 01/27/2023    EGFR 38 01/26/2023    EGFR 36 01/25/2023    CREATININE 1 58 (H) 01/27/2023    CREATININE 1 39 (H) 01/26/2023    CREATININE 1 45 (H) 01/25/2023  Baseline creatinine seems to range between 1 5-1 8      PLAN  Follow-up BMP in 1 week  -     Atrial fibrillation (HCC)  Assessment & Plan  Maintained on Toprol-XL 50 mg daily and Eliquis 5 mg twice daily  Patient is presenting for syncopal episode  EKG on this admission shows sinus tachycardia with heart rate of 113, patient noted to be septic on this admission and she admits to not taking her metoprolol this morning      PLAN  Stable for discharge, continue medications as prescribed        CHF (congestive heart failure) ([facility])  Assessment & Plan  Wt Readings from Last 3 Encounters:  01/27/23 115 kg (252 lb 13 9 oz)     Maintained on torsemide 40 mg daily  Stress test in August 2022 shows an ejection fraction of 70%  Patient appears to be euvolemic on this admission  Continue home medications  Monitor BMP  Magnesium level ordered  Cardiac diet ordered      Stable for discharge, resume home meds     Diabetes [facility])  Assessment & Plan  Lab Results  Component Value Date    HGBA1C 11 0 (H) 01/23/2023        Recent Labs    01/26/23  1706 01/26/23  2104 01/27/23  0736 01/27/23  1126  POCGLU 339* 341* 95 274*        Blood Sugar Average: Last 72 hrs:  (P) 182 [id number]  Blood sugar appears to be poorly controlled on this admission  Patient is maintained on Toujeo 25 units before breakfast and 25 units before bedtime along with mealtime Humalog    Patient admits to only checking her blood sugars and taking mealtime insulin at breakfast      PLAN:  -HBA1C- 11%  -Has borderline low glucose in am- might have some Somoji effect  - Decreased basal insulin to 25 u  -Follow-up with endocrinology in 2 weeks     COPD (chronic obstructive pulmonary disease) [facility])  Assessment & Plan  Maintained on albuterol every 6 hours as needed, daily fluticasone, Singulair nightly  Patient uses 2 L nasal cannula oxygen at nighttime      PLAN:  -Continue home maintenance medications- singulair  - stable for discharge     Syncope  Assessment & Plan  Patient presents after syncopal episode while sitting on the toilet  Past medical history significant for atrial fibrillation maintained on metoprolol and Eliquis, CHF on torsemide 40 mg daily, and type 2 diabetes  Patient says she went to the bathroom earlier this morning and was found on the bathroom floor by her granddaughter  Patient does not recall prodromal symptoms prior to passing out but says she may have been trying to stand up  She denies straining just before losing consciousness  Granddaughter reports O2 saturation at the time of finding the patient was approximately 88%  Patient found to be tachycardic and hypertensive on POA but she did not take her metoprolol this morning  EKG shows sinus tachycardia  Troponins are negative  Stress test from August 2022 shows an EF 70%  As of now there is an unclear etiology of why the patient lost consciousness, however tachyarrhythmia and orthostatic hypotension are certainly possibilities    She will need further work-up and observation      Ddx: orthostatic hypotension vs  low BP in s/o PNA infx vs  tachyarrhythmia      PLAN:  -stable for dcd  - asia stockings           Fall  Assessment & Plan  Fall following syncopal episode at home, home pulse ox 88%  CT head and trauma series negative     PLAN  -Fall precautions   Tylenol 650  Every 8 hours as needed  -PT/OT - rec post-acute rehab  -Patient stable for discharge to rehab- [facility]     SEBASTIAN (acute kidney injury) (HCC)-resolved as of 1/25/2023  Assessment & Plan  Recent Labs    01/25/23  0546 01/26/23  0643 01/27/23  0501  CREATININE 1 45* 1 39* 1 58*  EGFR 36 38 32     Estimated Creatinine Clearance: 39 8 mL/min (A) (by C-G formula based on SCr of 1 58 mg/dL (H))     Baseline 1 5  SEBASTIAN secondary to sepsis and hypotension     PLAN  -Isolyte 100cc/hr d/c'ed 1/24  -Monitor I/O's  -Daily BMP  -Consider nephrology consultation if worsening kidney fxn           Pneumonia presented after syncopal episode while being on the toilet  Patient is a known case of CHF, COPD, obstructive sleep apnea, poor CPAP tolerance, granddaughter found patient unconscious on the floor oxygen level was 88%  Patient had several days of productive cough and green phlegm  Did not have any chest pain  Work-up in the emergency room consistent with a leukocytosis  Patient was found to be tachycardic  CT scan revealed multifocal right-sided pneumonia  Patient was given IV fluid bolus IV Rocephin was started  Sputum culture grew gram-positive rods, gram-positive cocci, blood cultures were negative, procalcitonin 0 83, 1 28, and 0 13, WBC count was trending downwards from 15,000 down to 9000 at the point of discharge  Finished 5 days of antibiotic  Sepsis secondary to community-acquired pneumonia-as outlined by leukocytosis tachycardia and    X-ray/nosebleed transient occasional due to nasal cannula, noted on 126 had prolonged nosebleed with large clot which is resolved at the time of discharge    BMI 47 37 continue diet    Open wound: Patient had this wound prior to the hospitalization patient was going to the wound care center  Family supportive  He will at this point  Patient was also seeing podiatrist   Wound was localized onto the right heel     Apnea: Remains on 2 L nasal cannula at nighttime with humidifier  Hypertension symptom-free Home medications included Toprol-XL 50 mg daily and torsemide 40 mg daily medications were held during hospitalizations  Patient was discharged back on the metoprolol XL 50 mg daily,torsemide 40 mg daily  CKD: GFR 32 at the point of discharge 36 at the point of mid stay    Baseline creatinine 1 5-1 8  Atrial fibrillation remains on metoprolol XL 50 mg daily and Eliquis 5 mg twice a day symptom-free  EKG showed sinus tachycardia heart rate of 113 on admission patient will continue to follow with cardiologist  CHF: Discharge weight 115 kg that is 253 pound  Weight is 259 pounds with fully dressed with the service here  C does not have any shortness of breath at rest   She does get some dyspnea on exertion  Remains on torsemide 40 mg daily, stress test in August 2022 revealed ejection fraction 70%, patient is euvolemic  Diabetes: Hemoglobin A1c recently was uncontrolled 11 0 was done 1/23/2023:  Was discharged on Toujeo 36 units twice a day,  Patient was discharged home on Toujeo 36 units in the morning with breakfast and 36 units at bedtime  Patient was also taking Humalog units 12  Three  times a day  Was subsequently seen by endocrinologist and [place] was decreased to 30 units twice a day as well as Humalog 6 units with each meal, and is now started on Ozempic  Patient just picked up the first medicine delivery today and she will be starting  COPD: Symptom-free on albuterol as needed, remains on fluticasone and Singulair daily, uses oxygen 2 L at nighttime     Recall on no major etiology were found  Atrial fibrillation maintained  CHF stable  Echocardiogram had revealed normal ejection fraction  Patient is advised to wear EDNA stockings  CT of the brain and trauma CTs were negative  Will be getting physical VNA, home physical therapy and Occupational Therapy evaluation pending at this time  SEBASTIAN Resolved  Procedure: CT abdomen pelvis wo contrast    Result Date: 1/22/2023  Narrative: CT ABDOMEN AND PELVIS WITHOUT IV CONTRAST INDICATION:  utilizing techniques to minimize radiation dose exposure, including the use of iterative reconstruction and automated exposure control  Enteric contrast was administered   FINDINGS: ABDOMEN LOWER CHEST:  Peripheral consolidative opacity in right lower lobe with scattered micronodular opacities in bilateral lower lobes  Coronary artery calcifications  LIVER/BILIARY TREE:  Unremarkable  GALLBLADDER:  No calcified gallstones  No pericholecystic inflammatory change  SPLEEN:  Unremarkable  PANCREAS:  Moderate fatty atrophy of the pancreas  ADRENAL GLANDS:  Unremarkable  KIDNEYS/URETERS:  Unremarkable  No hydronephrosis  STOMACH AND BOWEL:  Normal CT appearance of stomach  No dilated or thick-walled bowel loops  No small bowel obstruction  Mild colonic diverticulosis without evidence of diverticulitis  APPENDIX:  No findings to suggest appendicitis  ABDOMINOPELVIC CAVITY:  No ascites  No pneumoperitoneum  No lymphadenopathy  VESSELS:  Atherosclerotic changes are present  No evidence of aneurysm  PELVIS REPRODUCTIVE ORGANS:  Unremarkable for patient's age  URINARY BLADDER:  Unremarkable  ABDOMINAL WALL/INGUINAL REGIONS:  Small fat containing umbilical hernia and rectus diastases  OSSEOUS STRUCTURES:  No acute fracture or destructive osseous lesion  Old healed posttraumatic deformity of right 10th costovertebral junction  Diffuse idiopathic skeletal hyperostosis (DISH) the visualized lower thoracic spine  Mild multilevel degenerative changes of lumbar spine        Impression: No acute traumatic injury in the abdomen or pelvis  Peripheral consolidative opacity in right lower lobe with scattered micronodular opacities in bilateral lower lobes, findings are suspicious for multifocal pneumonia  Recommend follow-up CT chest without contrast 8-12 weeks to ensure resolution  Additional chronic/incidental findings as detailed above  I personally discussed this study with Gloria Pinon on 1/22/2023 at 10:09 AM  Workstation performed: [id number]     Procedure: XR heel / calcaneus 2+ vw right    Result Date: 1/15/2023  FINDINGS: There is no acute fracture or dislocation  Small calcaneal spur and enthesopathy  No lytic or blastic osseous lesion  Soft tissues are unremarkable  Impression: No acute osseous abnormality  Workstation performed: [id number]     Procedure: TRAUMA - CT head wo contrast    Result Date: 1/22/2023    FINDINGS: PARENCHYMA: Decreased attenuation is noted in periventricular and subcortical white matter demonstrating an appearance that is statistically most likely to represent mild microangiopathic change  No CT signs of acute infarction  No intracranial mass, mass effect or midline shift  No acute parenchymal hemorrhage  Arterial calcifications of carotid siphons  VENTRICLES AND EXTRA-AXIAL SPACES:  Normal for the patient's age  VISUALIZED ORBITS AND PARANASAL SINUSES:  Normal visualized orbits  Mild mucosal thickening of the visualized paranasal sinuses  CALVARIUM AND EXTRACRANIAL SOFT TISSUES:  No acute calvarial abnormality  Small scalp hematoma in right frontal region  OTHER: Multiple missing teeth and scattered dental caries suggestive of poor dentition  Impression: No acute intracranial abnormality  I personally discussed this study with Ashley Peraza on 1/22/2023 at 10:09 AM  Workstation performed: [id number]     Procedure: TRAUMA - CT spine cervical wo contrast    Result Date: 1/22/2023  FINDINGS: ALIGNMENT:  There is straightening of normal cervical lordosis  No subluxation or compression deformity  VERTEBRAE:  No fracture  DEGENERATIVE CHANGES:  Moderate multilevel cervical degenerative changes are noted  No critical central canal stenosis  PREVERTEBRAL AND PARASPINAL SOFT TISSUES: Unremarkable THORACIC INLET:  Normal  OTHER: Retropharyngeal course of bilateral carotid arteries, normal variant  Mild scattered calcified atherosclerotic disease     Impression: No cervical spine fracture or traumatic malalignment  I personally discussed this study with Ashley Peraza on 1/22/2023 at 10:09 AM   Workstation performed: [id number]     Procedure: XR chest 1 view    Result Date: 1/23/2023  Narrative: TRAUMA SERIES INDICATION:  trauma   COMPARISON: Same day trauma studies  VIEWS:  XR TRAUMA MULTIPLE 1  FINDINGS: CHEST: Supine frontal view of the chest is obtained  Cardiomediastinal silhouette is within normal limits accounting for technique and patient positioning  Peripheral consolidative opacity in right lower lobe  No layering pleural effusions detected  No pneumothorax is seen on this supine film  Upright images are more sensitive to detect anterior pneumothoraces if relevant  No displaced fractures  Partially imaged left reverse shoulder total arthroplasty  Mild scoliosis of lower thoracic spine  Spinal degenerative changes  Impression: Peripheral consolidative opacity in right lower lobe, suspicious for pneumonia  Please see same-day CT chest abdomen and pelvis without contrast for further evaluation  I personally discussed this study with Ashley Peraza on 1/22/2023 at 10:09 AM  Workstation performed: [id number]     Procedure: DXA bone density spine hip and pelvis    Result Date: 1/3/2023  RESULTS: LUMBAR SPINE Level: L2-L4 : BMD:  1 356  gm/cm2 T-score: 1 3 LEFT TOTAL HIP: BMD: 1 167  gm/cm2 T-score: 1 3 LEFT FEMORAL NECK: BMD: 0 916  gm/cm2 T score: -0 9 RIGHT TOTAL HIP: BMD:  1 113  gm/cm2 T-score: 0 8 RIGHT FEMORAL NECK: BMD:  1 021  gm/cm2 T score: -0 1 RIGHT  FOREARM:  33% RADIUS BMD:  1 041  gm/cm2  T-score: 1 9     Impression: 1  Normal bone density  f  Procedure: XR trauma multiple    Result Date: 1/22/2023  Narrative: TRAUMA SERIES INDICATION:  trauma  COMPARISON:  Same day trauma studies  VIEWS:  XR TRAUMA MULTIPLE 1  FINDINGS: CHEST: Supine frontal view of the chest is obtained  Cardiomediastinal silhouette is within normal limits accounting for technique and patient positioning  Peripheral consolidative opacity in right lower lobe  No layering pleural effusions detected  No pneumothorax is seen on this supine film  Upright images are more sensitive to detect anterior pneumothoraces if relevant  No displaced fractures  Partially imaged left reverse shoulder total arthroplasty  Mild scoliosis of lower thoracic spine  Spinal degenerative changes  Impression: Peripheral consolidative opacity in right lower lobe, suspicious for pneumonia  Please see same-day CT chest abdomen and pelvis without contrast for further evaluation  I personally discussed this study with Kristyn Lindsey on 1/22/2023 at 10:09 AM  Workstation performed: [id number]     Procedure: Mammo screening bilateral w 3d & cad    Result Date: 1/17/2023    FINDINGS: RIGHT A) CALCIFICATIONS: There are fine linear or fine-linear branching calcifications in a linear distribution seen in the outer central region of the right breast in the middle depth  Diagnostic mammography recommended  Left There are no suspicious masses, grouped microcalcifications or areas of unexplained architectural distortion  The skin and nipple areolar complex are unremarkable  Asymmetry in the retroareolar breast stable  Impression: Additional imaging required  A breast health care nurse from our facility will be contacting the patient regarding the need for additional imaging  ASSESSMENT/BI-RADS CATEGORY: Left: 2 - Benign Right: 0 - Incomplete: Needs Additional Imaging Evaluation Overall: 0 - Incomplete: Needs Additional Imaging Evaluation RECOMMENDATION:      - Diagnostic mammogram at the current time for the right breast       - Routine screening mammogram in 1 year for the left breast  Workstation ID: [id number]     Procedure: US bedside procedure    No complain         Review of Systems   Constitutional: Negative for appetite change, fatigue, fever and unexpected weight change  HENT: Negative for congestion, ear pain and sore throat  Eyes: Negative for pain and redness  Respiratory: Positive for shortness of breath  Negative for cough  Cardiovascular: Positive for leg swelling  Negative for chest pain and palpitations     Gastrointestinal: Negative for abdominal pain, diarrhea, nausea and vomiting  Endocrine: Negative for cold intolerance, polydipsia and polyuria  Genitourinary: Negative for dysuria, frequency and urgency  Musculoskeletal: Negative for arthralgias, gait problem and myalgias  Skin: Negative for rash  Allergic/Immunologic: Negative  Neurological: Negative for dizziness and headaches  Hematological: Negative for adenopathy  Psychiatric/Behavioral: Negative for behavioral problems, confusion, decreased concentration and dysphoric mood         Past Medical History:   Diagnosis Date   • SEBASTIAN (acute kidney injury) ([place] [id number] ) 1/23/2023   • Anemia    • Asthma    • Chronic kidney disease    • COPD (chronic obstructive pulmonary disease) ([place] [id number] )    • COVID-19     January 2022   • Diabetes mellitus ([place] [id number] )    • GERD (gastroesophageal reflux disease)    • Hyperlipidemia    • Hypertension    • PONV (postoperative nausea and vomiting)    • SVT (supraventricular tachycardia) ([facility])      Past Surgical History:   Procedure Laterality Date   • APPENDECTOMY     • BREAST BIOPSY Right     years ago when she was 23   • CYSTOSCOPY W/ LASER LITHOTRIPSY Left 07/12/2016    Procedure: CYSTOSCOPY URETEROSCOPY WITH LITHOTRIPSY HOLMIUM LASER, RETROGRADE PYELOGRAM AND INSERTION STENT URETERAL;  Surgeon: Janel Holt MD;  Location: [address] [place] [address];  Service:    • DILATION AND CURETTAGE OF UTERUS     • IR THORACENTESIS  03/18/2022   • JOINT REPLACEMENT      right knee   • KNEE ARTHROPLASTY Right    • TN ARTHROPLASTY GLENOHUMERAL JOINT TOTAL SHOULDER Left 03/01/2022    Procedure: ARTHROPLASTY SHOULDER REVERSE;  Surgeon: Viktor Perez MD;  Location: [address] [place] [address];  Service: Orthopedics   • TN CYSTO BLADDER W/URETERAL CATHETERIZATION Left 06/29/2016    Procedure: CYSTOSCOPY RETROGRADE PYELOGRAM WITH INSERTION STENT URETERAL, left;  Surgeon: Janel Holt MD;  [facility];  Service: Urology   • SHOULDER ARTHROTOMY Left      Family History   Problem Relation Age of Onset • Heart disease Mother    • Diabetes Father    • Thyroid cancer Sister    • Emphysema Maternal Grandmother    • Heart disease Family      Social History     Socioeconomic History   • Marital status: Single     Spouse name: None   • Number of children: 0   • Years of education: 15   • Highest education level: Associate degree: academic program   Occupational History   • None   Tobacco Use   • Smoking status: Former     Packs/day: 1 00     Years: 20 00     Pack years: 20 00     Types: Cigarettes     Quit date: 1996     Years since quittin 6   • Smokeless tobacco: Never   Vaping Use   • Vaping Use: Never used   Substance and Sexual Activity   • Alcohol use: Not Currently     Comment: rarely   • Drug use: Never   • Sexual activity: Not Currently   Other Topics Concern   • None   Social History Narrative    ** Merged History Encounter **          Social Determinants of Health     Financial Resource Strain: Not on file   Food Insecurity: No Food Insecurity   • Worried About Running Out of Food in the Last Year: Never true   • Ran Out of Food in the Last Year: Never true   Transportation Needs: No Transportation Needs   • Lack of Transportation (Medical): No   • Lack of Transportation (Non-Medical):  No   Physical Activity: Not on file   Stress: Not on file   Social Connections: Not on file   Intimate Partner Violence: Not on file   Housing Stability: Low Risk    • Unable to Pay for Housing in the Last Year: No   • Number of Places Lived in the Last Year: 2   • Unstable Housing in the Last Year: No     Current Outpatient Medications on File Prior to Visit   Medication Sig   • acetaminophen (TYLENOL) 325 mg tablet Take 650 mg by mouth every 6 (six) hours as needed for mild pain     • albuterol (PROVENTIL HFA,VENTOLIN HFA) 90 mcg/act inhaler Inhale 2 puffs every 6 (six) hours as needed for wheezing   • albuterol (PROVENTIL HFA,VENTOLIN HFA) 90 mcg/act inhaler Inhale 2 puffs every 6 (six) hours as needed for wheezing   • ammonium lactate (LAC-HYDRIN) 12 % lotion APPLY TOPICALLY TO AFFECTED AREAS twice a day   • apixaban (ELIQUIS) 5 mg Take 5 mg by mouth 2 (two) times a day   • B Complex-C-Folic Acid (triphrocaps) 1 MG CAPS take 1 capsule by mouth once daily   • Continuous Blood Gluc  (FreeStyle Jett 2 [place]) NORM Use 1 Units continuous   • Continuous Blood Gluc Sensor (FreeStyle Dawn 2 Sensor) MISC Use 1 Units every 14 (fourteen) days   • docusate sodium (COLACE) 100 mg capsule Take 100 mg by mouth in the morning   • Eliquis 5 MG take 1 tablet by mouth twice a day every morning and evening   • fluticasone (FLONASE) 50 mcg/act nasal spray 1 spray into each nostril daily   • glucose blood test strip Use 1 each 2 (two) times a day Use one 2 times daily   • insulin glargine (Toujeo SoloStar) 300 units/mL CONCENTRATED U-300 injection pen (1-unit dial) Inject 30 Units under the skin every 12 (twelve) hours   • insulin lispro (HumaLOG KwikPen) 100 units/mL injection pen 3 times with meal according to sliding scale - >Blood Glucose 150 - 199: 2 Unit of Insulin, Blood Glucose 200 - 249: 4 Units of Insulin, Blood Glucose 250 - 299: 6 Units of Insulin, Blood Glucose 300 - 349: 8 Units of Insulin, Blood Glucose 350 - 399: 10 Units of Inuslin,Blood Glucose greater than or equal to 400: 12 Units of Insulin-please contact your physician   • Insulin Pen Needle (BD Pen Needle Pilar 2nd Gen) 32G X 4 MM MISC For use with insulin pen  Pharmacy may dispense brand covered by insurance     • Insulin Pen Needle (NovoFine Autocover) 30G X 8 MM MISC Inject under the skin daily   • Insulin Pen Needle 30G X 8 MM MISC 2 (two) times a day   • Lancets (onetouch ultrasoft) lancets Use once daily   • meclizine (ANTIVERT) 25 mg tablet Take 1 tablet (25 mg total) by mouth every 8 (eight) hours   • metoprolol succinate (TOPROL-XL) 50 mg 24 hr tablet Take 50 mg by mouth daily   • montelukast (SINGULAIR) 10 mg tablet Take 10 mg by mouth daily at bedtime   • pregabalin (LYRICA) 100 mg capsule take 1 capsule by mouth twice a day   • rosuvastatin (CRESTOR) 10 MG tablet take 1 tablet by mouth once daily   • semaglutide, 0 25 or 0 5 mg/dose, (Ozempic, 0 25 or 0 5 MG/DOSE,) 2 mg/1 5 mL injection pen Use 0 25mg weekly for 4 weeks and then 0 5mg weekly   • torsemide (DEMADEX) 20 mg tablet Take 2 tablets in the AM   • benzonatate (TESSALON) 200 MG capsule Take 1 capsule (200 mg total) by mouth 3 (three) times a day as needed for cough (Patient not taking: Reported on 2/9/2023)   • fluticasone (FLONASE) 50 mcg/act nasal spray 1 spray into each nostril daily (Patient not taking: Reported on 2/9/2023)   • Insulin Pen Needle 31G X 8 MM MISC Use daily Inject under the skin   • metoprolol succinate (TOPROL-XL) 50 mg 24 hr tablet Take 1 tablet (50 mg total) by mouth daily (Patient not taking: Reported on 2/9/2023)   • montelukast (SINGULAIR) 10 mg tablet Take 10 mg by mouth daily   (Patient not taking: Reported on 2/9/2023)   • ondansetron (ZOFRAN-ODT) 4 mg disintegrating tablet Take 1 tablet (4 mg total) by mouth every 6 (six) hours as needed for nausea or vomiting (Patient not taking: Reported on 2/14/2023)   • pregabalin (LYRICA) 100 mg capsule Take 100 mg by mouth 2 (two) times a day (Patient not taking: Reported on 2/9/2023)   • rosuvastatin (CRESTOR) 10 MG tablet Take 10 mg by mouth daily (Patient not taking: Reported on 2/9/2023)   • senna-docusate sodium (SENOKOT S) 8 6-50 mg per tablet Take 1 tablet by mouth 2 (two) times a day for 7 days   • torsemide (DEMADEX) 20 mg tablet Take 20 mg by mouth 2 (two) times a day (Patient not taking: Reported on 2/9/2023)     Allergies   Allergen Reactions   • Penicillins Hives   • Moxifloxacin Other (See Comments)     unknown   • Percocet [Oxycodone-Acetaminophen] Other (See Comments)     GI upset   • Zinc Acetate Other (See Comments)     unknown   • Penicillins Hives   • Asa [Aspirin] GI Intolerance   • Indocin [Indomethacin] Other (See Comments)     Made patient "loopy"   • Other Other (See Comments)     unknown     Immunization History   Administered Date(s) Administered   • COVID-19 MODERNA VACC 0 5 ML IM 2022   • COVID-19 Moderna vac 6-11y or adult booster 50 mcg/0 5 mL 2022   • Tdap 2023   • Tuberculin Skin Test 2022   • Unknown 2022       Objective     /78   Pulse 68   Temp 98 °F (36 7 °C)   Ht 5' 2" (1 575 m)   Wt 117 kg (259 lb)   SpO2 100%   BMI 47 37 kg/m²     Physical Exam  Vitals reviewed  Exam conducted with a chaperone present  Constitutional:       General: She is not in acute distress  Appearance: She is well-developed  She is obese  She is not ill-appearing, toxic-appearing or diaphoretic  HENT:      Head: Normocephalic and atraumatic  Right Ear: External ear normal       Left Ear: External ear normal       Nose: No congestion or rhinorrhea  Eyes:      General: Lids are normal          Right eye: No discharge  Left eye: No discharge  Conjunctiva/sclera: Conjunctivae normal    Neck:      Thyroid: No thyroid mass, thyromegaly or thyroid tenderness  Vascular: No carotid bruit or JVD  Trachea: Trachea normal    Cardiovascular:      Rate and Rhythm: Rhythm irregular  Heart sounds: Normal heart sounds  No murmur heard  Pulmonary:      Effort: No respiratory distress  Breath sounds: Normal breath sounds  No wheezing, rhonchi or rales  Abdominal:      General: Bowel sounds are normal  There is no distension  Palpations: There is no mass  Tenderness: There is no abdominal tenderness  There is no rebound  Musculoskeletal:      Right lower le+ Pitting Edema present  Left lower le+ Pitting Edema present  Lymphadenopathy:      Cervical: No cervical adenopathy  Skin:     General: Skin is warm  Coloration: Skin is not jaundiced or pale  Findings: No rash  Neurological:      Mental Status: She is alert  Coordination: Coordination normal       Gait: Gait abnormal    Psychiatric:         Mood and Affect: Mood normal          Behavior: Behavior normal          Thought Content:  Thought content normal          Judgment: Judgment normal        Kingston Tapia MD

## 2023-02-14 NOTE — PATIENT INSTRUCTIONS
The blood test, chest x-ray somewhere around February 25,  Reach out to endocrinologist regarding [place] [facility]  Follow-up with blood sugar with them  Follow with Consultants as per their and our suggestion    Follow up in one month or as needed earlier    Follow all instructions as advised and discussed  Take your medications as prescribed  Call the office immediately if you experience any side effects  Ask questions if you do not understand  Keep your scheduled appointment as advised or come sooner if necessary or in doubt  Best time to call for non-urgent matter or questions on weekdays is between 9am and 12 noon  See physician for any new symptoms or worsening of current symptoms  Urgent or emergent situations call 911 and report to nearest emergency room  I spent  30 -40 minutes taking care of this patient including clinical care, conseling, collaboration, chart, lab and consultaion review as appropriate    Patient is to get labs 1 week(s) prior to next visit if advised     Antivert     If rash persist let me know

## 2023-02-18 ENCOUNTER — TELEPHONE (OUTPATIENT)
Dept: OTHER | Facility: OTHER | Age: 73
End: 2023-02-18

## 2023-02-18 NOTE — TELEPHONE ENCOUNTER
Deninse called in from A to notify Dr Roderick Salas that PT has started skilled nursing services for home visits

## 2023-02-21 ENCOUNTER — TELEPHONE (OUTPATIENT)
Dept: INTERNAL MEDICINE CLINIC | Facility: CLINIC | Age: 73
End: 2023-02-21

## 2023-02-21 NOTE — TELEPHONE ENCOUNTER
Carmen from [facility] called to inform that she went out to see Lu Christensen for PT eval  She called to inform that Lu Christensen is baseline and does not need PT

## 2023-02-23 DIAGNOSIS — E11.65 TYPE 2 DIABETES MELLITUS WITH HYPERGLYCEMIA, WITHOUT LONG-TERM CURRENT USE OF INSULIN (HCC): Primary | ICD-10-CM

## 2023-02-23 RX ORDER — INSULIN ADMIN. SUPPLIES
INSULIN PEN (EA) SUBCUTANEOUS
Qty: 300 EACH | Refills: 5 | Status: SHIPPED | OUTPATIENT
Start: 2023-02-23

## 2023-02-24 ENCOUNTER — CLINICAL SUPPORT (OUTPATIENT)
Dept: ENDOCRINOLOGY | Facility: CLINIC | Age: 73
End: 2023-02-24

## 2023-02-24 VITALS
BODY MASS INDEX: 47.66 KG/M2 | TEMPERATURE: 96.8 F | WEIGHT: 259 LBS | OXYGEN SATURATION: 99 % | HEART RATE: 81 BPM | HEIGHT: 62 IN

## 2023-02-24 DIAGNOSIS — Z79.4 TYPE 2 DIABETES MELLITUS WITH HYPERGLYCEMIA, WITH LONG-TERM CURRENT USE OF INSULIN (HCC): ICD-10-CM

## 2023-02-24 DIAGNOSIS — E11.65 TYPE 2 DIABETES MELLITUS WITH HYPERGLYCEMIA, WITH LONG-TERM CURRENT USE OF INSULIN (HCC): ICD-10-CM

## 2023-02-24 NOTE — PROGRESS NOTES
Continous glucose monitoring kar intrepretation     Date/Time 2/24/2023 10:09 AM     Performed by  Matthew Mcclellan     Authorized by Brayden Ponce MD      Universal Protocol   Consent: Verbal consent obtained  Written consent obtained    Consent given by: patient  Timeout called at: 2/24/2023 10:09 AM   Patient understanding: patient states understanding of the procedure being performed  Patient consent: the patient's understanding of the procedure matches consent given  Procedure consent: procedure consent matches procedure scheduled  Relevant documents: relevant documents present and verified  Test results: test results available and properly labeled  Site marked: the operative site was not marked  Patient identity confirmed: verbally with patient        Local anesthesia used: no     Anesthesia   Local anesthesia used: no     Sedation   Patient sedated: no        Specimen: no    Culture: no   Procedure Details   Procedure Notes: 15 days report  Patient tolerated placement and removal

## 2023-02-27 ENCOUNTER — TREATMENT (OUTPATIENT)
Dept: OTHER | Facility: HOSPITAL | Age: 73
End: 2023-02-27

## 2023-02-27 DIAGNOSIS — E11.00 TYPE 2 DIABETES MELLITUS WITH HYPEROSMOLARITY WITHOUT COMA, WITHOUT LONG-TERM CURRENT USE OF INSULIN (HCC): ICD-10-CM

## 2023-02-27 RX ORDER — INSULIN LISPRO 100 [IU]/ML
INJECTION, SOLUTION INTRAVENOUS; SUBCUTANEOUS
Qty: 15 ML | Refills: 2 | Status: SHIPPED | OUTPATIENT
Start: 2023-02-27

## 2023-02-27 NOTE — PROGRESS NOTES
CGM data review[de-identified]  Device: Dawn pro dates: 2023 to 2023  usage: 99% Av glu: 91 mg/dL  SD:  mg/dL CV: 30%  TIR: 43%  TAR: 38+18%  TBR: 1 %    Glycemic patters: Significant postprandial hyperglycemia with mild fasting hyperglycemia  Hypoglycemia: No    Recommendation:    INSULIN DOSAGE INSTRUCTIONS    Name: Cayden Vieira                        : 1950  MRN #: [id number]    Your Current Insulin  and dose is: Before Breakfast Before Lunch Before Evening Meal Bedtime     Humalog Insulin     10u   10u   10u    Regular, Apidra, Humalog orNovolog Sliding Scale:   <80              151-200 + 1 +1 +1    201-250 +2 +2 +2 +   251-300 +3 +3 +3 +   301-350 +4 +4 +4 +   >350 +5 +5 +5 +     Ozempic once weekly         Toujeo  25u    25u     Additional Instructions:   Please test your blood sugar:  _4_ Times per day  X_ Before Breakfast                _ Alternate Testing  X_ Before Lunch                _ 2 Hours After  Meal  X_ Before Evening Meal               _ 3 a m   x_ Before Bedtime Snack     Target Blood sugar range _90_to _180__

## 2023-03-02 ENCOUNTER — APPOINTMENT (EMERGENCY)
Dept: RADIOLOGY | Facility: HOSPITAL | Age: 73
End: 2023-03-02

## 2023-03-02 ENCOUNTER — TELEPHONE (OUTPATIENT)
Dept: INTERNAL MEDICINE CLINIC | Facility: CLINIC | Age: 73
End: 2023-03-02

## 2023-03-02 ENCOUNTER — HOSPITAL ENCOUNTER (OUTPATIENT)
Facility: HOSPITAL | Age: 73
Setting detail: OBSERVATION
Discharge: HOME | End: 2023-03-02
Attending: EMERGENCY MEDICINE | Admitting: INTERNAL MEDICINE

## 2023-03-02 ENCOUNTER — APPOINTMENT (OUTPATIENT)
Dept: RADIOLOGY | Facility: HOSPITAL | Age: 73
End: 2023-03-02

## 2023-03-02 VITALS
WEIGHT: 259 LBS | BODY MASS INDEX: 47.66 KG/M2 | OXYGEN SATURATION: 99 % | DIASTOLIC BLOOD PRESSURE: 71 MMHG | RESPIRATION RATE: 14 BRPM | HEART RATE: 70 BPM | SYSTOLIC BLOOD PRESSURE: 168 MMHG | TEMPERATURE: 97.6 F | HEIGHT: 62 IN

## 2023-03-02 DIAGNOSIS — J40 BRONCHITIS: ICD-10-CM

## 2023-03-02 DIAGNOSIS — R06.00 DYSPNEA, UNSPECIFIED TYPE: Primary | ICD-10-CM

## 2023-03-02 LAB
2HR DELTA HS TROPONIN: 0 NG/L
ALBUMIN SERPL BCP-MCNC: 3.6 G/DL (ref 3.5–5)
ALP SERPL-CCNC: 63 U/L (ref 34–104)
ALT SERPL W P-5'-P-CCNC: 13 U/L (ref 7–52)
ANION GAP SERPL CALCULATED.3IONS-SCNC: 8 MMOL/L (ref 4–13)
APTT PPP: 30 SECONDS (ref 23–37)
AST SERPL W P-5'-P-CCNC: 16 U/L (ref 13–39)
BACTERIA UR QL AUTO: ABNORMAL /HPF
BASOPHILS # BLD AUTO: 0.08 THOUSANDS/ÂΜL (ref 0–0.1)
BASOPHILS NFR BLD AUTO: 1 % (ref 0–1)
BILIRUB SERPL-MCNC: 0.27 MG/DL (ref 0.2–1)
BILIRUB UR QL STRIP: NEGATIVE
BNP SERPL-MCNC: 35 PG/ML (ref 0–100)
BUN SERPL-MCNC: 50 MG/DL (ref 5–25)
CALCIUM SERPL-MCNC: 8.4 MG/DL (ref 8.4–10.2)
CARDIAC TROPONIN I PNL SERPL HS: 4 NG/L
CARDIAC TROPONIN I PNL SERPL HS: 4 NG/L
CHLORIDE SERPL-SCNC: 99 MMOL/L (ref 96–108)
CLARITY UR: CLEAR
CO2 SERPL-SCNC: 33 MMOL/L (ref 21–32)
COLOR UR: ABNORMAL
CREAT SERPL-MCNC: 1.64 MG/DL (ref 0.6–1.3)
D DIMER PPP FEU-MCNC: 1.03 UG/ML FEU
EOSINOPHIL # BLD AUTO: 0.5 THOUSAND/ÂΜL (ref 0–0.61)
EOSINOPHIL NFR BLD AUTO: 6 % (ref 0–6)
ERYTHROCYTE [DISTWIDTH] IN BLOOD BY AUTOMATED COUNT: 14.2 % (ref 11.6–15.1)
FLUAV RNA RESP QL NAA+PROBE: NEGATIVE
FLUBV RNA RESP QL NAA+PROBE: NEGATIVE
GFR SERPL CREATININE-BSD FRML MDRD: 31 ML/MIN/1.73SQ M
GLUCOSE SERPL-MCNC: 180 MG/DL (ref 65–140)
GLUCOSE UR STRIP-MCNC: NEGATIVE MG/DL
HCT VFR BLD AUTO: 35.1 % (ref 34.8–46.1)
HGB BLD-MCNC: 11.2 G/DL (ref 11.5–15.4)
HGB UR QL STRIP.AUTO: ABNORMAL
IMM GRANULOCYTES # BLD AUTO: 0.03 THOUSAND/UL (ref 0–0.2)
IMM GRANULOCYTES NFR BLD AUTO: 0 % (ref 0–2)
INR PPP: 1.09 (ref 0.84–1.19)
KETONES UR STRIP-MCNC: NEGATIVE MG/DL
LEUKOCYTE ESTERASE UR QL STRIP: ABNORMAL
LYMPHOCYTES # BLD AUTO: 2.25 THOUSANDS/ÂΜL (ref 0.6–4.47)
LYMPHOCYTES NFR BLD AUTO: 27 % (ref 14–44)
MCH RBC QN AUTO: 30.3 PG (ref 26.8–34.3)
MCHC RBC AUTO-ENTMCNC: 31.9 G/DL (ref 31.4–37.4)
MCV RBC AUTO: 95 FL (ref 82–98)
MONOCYTES # BLD AUTO: 0.79 THOUSAND/ÂΜL (ref 0.17–1.22)
MONOCYTES NFR BLD AUTO: 9 % (ref 4–12)
NEUTROPHILS # BLD AUTO: 4.72 THOUSANDS/ÂΜL (ref 1.85–7.62)
NEUTS SEG NFR BLD AUTO: 57 % (ref 43–75)
NITRITE UR QL STRIP: NEGATIVE
NON-SQ EPI CELLS URNS QL MICRO: ABNORMAL /HPF
NRBC BLD AUTO-RTO: 0 /100 WBCS
OTHER STN SPEC: ABNORMAL
PH UR STRIP.AUTO: 5.5 [PH]
PLATELET # BLD AUTO: 170 THOUSANDS/UL (ref 149–390)
PMV BLD AUTO: 10.9 FL (ref 8.9–12.7)
POTASSIUM SERPL-SCNC: 4.2 MMOL/L (ref 3.5–5.3)
PROT SERPL-MCNC: 6.9 G/DL (ref 6.4–8.4)
PROT UR STRIP-MCNC: NEGATIVE MG/DL
PROTHROMBIN TIME: 14.2 SECONDS (ref 11.6–14.5)
RBC # BLD AUTO: 3.7 MILLION/UL (ref 3.81–5.12)
RBC #/AREA URNS AUTO: ABNORMAL /HPF
RSV RNA RESP QL NAA+PROBE: NEGATIVE
SARS-COV-2 RNA RESP QL NAA+PROBE: NEGATIVE
SODIUM SERPL-SCNC: 140 MMOL/L (ref 135–147)
SP GR UR STRIP.AUTO: 1.01 (ref 1–1.03)
UROBILINOGEN UR QL STRIP.AUTO: 0.2 E.U./DL
WBC # BLD AUTO: 8.37 THOUSAND/UL (ref 4.31–10.16)
WBC #/AREA URNS AUTO: ABNORMAL /HPF

## 2023-03-02 RX ORDER — AZITHROMYCIN 250 MG/1
TABLET, FILM COATED ORAL
Qty: 6 TABLET | Refills: 0 | Status: SHIPPED | OUTPATIENT
Start: 2023-03-02 | End: 2023-03-06

## 2023-03-02 RX ORDER — AZITHROMYCIN 250 MG/1
500 TABLET, FILM COATED ORAL ONCE
Status: COMPLETED | OUTPATIENT
Start: 2023-03-02 | End: 2023-03-02

## 2023-03-02 RX ORDER — IPRATROPIUM BROMIDE AND ALBUTEROL SULFATE 2.5; .5 MG/3ML; MG/3ML
3 SOLUTION RESPIRATORY (INHALATION) ONCE
Status: COMPLETED | OUTPATIENT
Start: 2023-03-02 | End: 2023-03-02

## 2023-03-02 RX ADMIN — AZITHROMYCIN MONOHYDRATE 500 MG: 250 TABLET ORAL at 21:03

## 2023-03-02 RX ADMIN — IPRATROPIUM BROMIDE AND ALBUTEROL SULFATE 3 ML: 2.5; .5 SOLUTION RESPIRATORY (INHALATION) at 18:05

## 2023-03-02 RX ADMIN — SODIUM CHLORIDE 500 ML: 0.9 INJECTION, SOLUTION INTRAVENOUS at 20:06

## 2023-03-02 RX ADMIN — IOHEXOL 66 ML: 350 INJECTION, SOLUTION INTRAVENOUS at 20:02

## 2023-03-02 NOTE — ED PROVIDER NOTES
History  Chief Complaint   Patient presents with   • Shortness of Breath     Coughing and sob for last 4 days, has hx of copd and chf  States she also had pneumonia about a month ago     30-year-old female presents to the ED shortness of breath states she has had some cough and shortness of breath last 4 days has history of COPD and CHF states it feels little more like CHF has had some swelling in her ankles but really not much more than she normally has  Patient also had a recent bout with pneumonia states she has been doing better on that  Denies fevers chills nausea vomiting or diarrhea  Patient states she has been using her inhaler at home but has not had relief with that  States she has some wheezing  History provided by:  Patient and relative   used: No        Prior to Admission Medications   Prescriptions Last Dose Informant Patient Reported? Taking? B Complex-C-Folic Acid (triphrocaps) 1 MG CAPS   No No   Sig: take 1 capsule by mouth once daily   Continuous Blood Gluc  (FreeStyle Dawn 2 Dorr) NORM   No No   Sig: Use 1 Units continuous   Continuous Blood Gluc Sensor (FreeStyle Dawn 2 Sensor) MISC   No No   Sig: Use 1 Units every 14 (fourteen) days   Eliquis 5 MG   No No   Sig: take 1 tablet by mouth twice a day every morning and evening   Insulin Pen Needle (BD Pen Needle Pilar 2nd Gen) 32G X 4 MM MISC   No No   Sig: For use with insulin pen  Pharmacy may dispense brand covered by insurance     Insulin Pen Needle (NovoFine Autocover) 30G X 8 MM MISC   No No   Sig: Inject under the skin daily   Insulin Pen Needle 30G X 8 MM MISC   Yes No   Si (two) times a day   Insulin Pen Needle 31G X 8 MM MISC   No No   Sig: Use daily Inject under the skin   Lancets (onetouch ultrasoft) lancets   No No   Sig: Use once daily   NovoFine Autocover Pen Needle 30G X 8 MM MISC   No No   Sig: USE TWICE A DAY   acetaminophen (TYLENOL) 325 mg tablet   Yes No   Sig: Take 650 mg by mouth every 6 (six) hours as needed for mild pain     albuterol (PROVENTIL HFA,VENTOLIN HFA) 90 mcg/act inhaler   No No   Sig: Inhale 2 puffs every 6 (six) hours as needed for wheezing   albuterol (PROVENTIL HFA,VENTOLIN HFA) 90 mcg/act inhaler   Yes No   Sig: Inhale 2 puffs every 6 (six) hours as needed for wheezing   ammonium lactate (LAC-HYDRIN) 12 % lotion   Yes No   Sig: APPLY TOPICALLY TO AFFECTED AREAS twice a day   apixaban (ELIQUIS) 5 mg   Yes No   Sig: Take 5 mg by mouth 2 (two) times a day   benzonatate (TESSALON) 200 MG capsule   No No   Sig: Take 1 capsule (200 mg total) by mouth 3 (three) times a day as needed for cough   Patient not taking: Reported on 2023   docusate sodium (COLACE) 100 mg capsule   Yes No   Sig: Take 100 mg by mouth in the morning   fluticasone (FLONASE) 50 mcg/act nasal spray   No No   Si spray into each nostril daily   fluticasone (FLONASE) 50 mcg/act nasal spray   Yes No   Si spray into each nostril daily   Patient not taking: Reported on 2023   glucose blood test strip   No No   Sig: Use 1 each 2 (two) times a day Use one 2 times daily   insulin glargine (Toujeo SoloStar) 300 units/mL CONCENTRATED U-300 injection pen (1-unit dial)   Yes No   Sig: Inject 25 Units under the skin every 12 (twelve) hours   insulin lispro (HumaLOG KwikPen) 100 units/mL injection pen   No No   Sig: Use 10 units prior to each meal +1 unit per 50 above 150 mg/dL   meclizine (ANTIVERT) 25 mg tablet   No No   Sig: Take 1 tablet (25 mg total) by mouth every 8 (eight) hours   metoprolol succinate (TOPROL-XL) 50 mg 24 hr tablet   No No   Sig: Take 1 tablet (50 mg total) by mouth daily   Patient not taking: Reported on 2023   metoprolol succinate (TOPROL-XL) 50 mg 24 hr tablet   Yes No   Sig: Take 50 mg by mouth daily   montelukast (SINGULAIR) 10 mg tablet   Yes No   Sig: Take 10 mg by mouth daily     Patient not taking: Reported on 2023   montelukast (SINGULAIR) 10 mg tablet   Yes No   Sig: Take 10 mg by mouth daily at bedtime   ondansetron (ZOFRAN-ODT) 4 mg disintegrating tablet   No No   Sig: Take 1 tablet (4 mg total) by mouth every 6 (six) hours as needed for nausea or vomiting   Patient not taking: Reported on 2/14/2023   pregabalin (LYRICA) 100 mg capsule   No No   Sig: take 1 capsule by mouth twice a day   pregabalin (LYRICA) 100 mg capsule   Yes No   Sig: Take 100 mg by mouth 2 (two) times a day   Patient not taking: Reported on 2/9/2023   rosuvastatin (CRESTOR) 10 MG tablet   No No   Sig: take 1 tablet by mouth once daily   rosuvastatin (CRESTOR) 10 MG tablet   Yes No   Sig: Take 10 mg by mouth daily   Patient not taking: Reported on 2/9/2023   semaglutide, 0 25 or 0 5 mg/dose, (Ozempic, 0 25 or 0 5 MG/DOSE,) 2 mg/1 5 mL injection pen   No No   Sig: Use 0 25mg weekly for 4 weeks and then 0 5mg weekly   senna-docusate sodium (SENOKOT S) 8 6-50 mg per tablet   No No   Sig: Take 1 tablet by mouth 2 (two) times a day for 7 days   torsemide (DEMADEX) 20 mg tablet   No No   Sig: Take 2 tablets in the AM   torsemide (DEMADEX) 20 mg tablet   Yes No   Sig: Take 20 mg by mouth 2 (two) times a day   Patient not taking: Reported on 2/9/2023      Facility-Administered Medications: None       Past Medical History:   Diagnosis Date   • SEBASTIAN (acute kidney injury) ([facility] ) 1/23/2023   • Anemia    • Asthma    • Chronic kidney disease    • COPD (chronic obstructive pulmonary disease) ([facility] )    • COVID-19 January 2022   • Diabetes mellitus ([facility] )    • GERD (gastroesophageal reflux disease)    • Hyperlipidemia    • Hypertension    • PONV (postoperative nausea and vomiting)    • SVT (supraventricular tachycardia) ([facility] )        Past Surgical History:   Procedure Laterality Date   • APPENDECTOMY     • BREAST BIOPSY Right     years ago when she was 23   • CYSTOSCOPY W/ LASER LITHOTRIPSY Left 07/12/2016    Procedure: CYSTOSCOPY URETEROSCOPY WITH LITHOTRIPSY HOLMIUM LASER, RETROGRADE PYELOGRAM AND INSERTION STENT URETERAL; Surgeon: Hemant Tolbert MD;  Location: [address] [place] [address];  Service:    • DILATION AND CURETTAGE OF UTERUS     • IR THORACENTESIS  2022   • JOINT REPLACEMENT      right knee   • KNEE ARTHROPLASTY Right    • MS ARTHROPLASTY GLENOHUMERAL JOINT TOTAL SHOULDER Left 2022    Procedure: ARTHROPLASTY SHOULDER REVERSE;  Surgeon: Alondra Rayo MD;  Location: [facility];  Service: Orthopedics   • MS CYSTO BLADDER W/URETERAL CATHETERIZATION Left 2016    Procedure: CYSTOSCOPY RETROGRADE PYELOGRAM WITH INSERTION STENT URETERAL, left;  Surgeon: Hemant Tolbert MD;  Location: [place] [facility];  Service: Urology   • SHOULDER ARTHROTOMY Left        Family History   Problem Relation Age of Onset   • Heart disease Mother    • Diabetes Father    • Thyroid cancer Sister    • Emphysema Maternal Grandmother    • Heart disease Family      I have reviewed and agree with the history as documented  E-Cigarette/Vaping   • E-Cigarette Use Never User      E-Cigarette/Vaping Substances   • Nicotine No    • THC No    • CBD No    • Flavoring No    • Other No    • Unknown No      Social History     Tobacco Use   • Smoking status: Former     Packs/day:      Years:      Pack years: 20      Types: Cigarettes     Quit date: 1996     Years since quittin 6   • Smokeless tobacco: Never   Vaping Use   • Vaping Use: Never used   Substance Use Topics   • Alcohol use: Not Currently     Comment: rarely   • Drug use: Never       Review of Systems   Constitutional: Negative for activity change, chills, diaphoresis and fever  HENT: Positive for congestion  Negative for ear pain, nosebleeds, sore throat, trouble swallowing and voice change  Eyes: Negative for pain, discharge and redness  Respiratory: Positive for cough, shortness of breath and wheezing  Negative for apnea, choking and stridor  Cardiovascular: Negative for chest pain and palpitations     Gastrointestinal: Negative for abdominal distention, abdominal pain, constipation, diarrhea, nausea and vomiting  Endocrine: Negative for polydipsia  Genitourinary: Negative for difficulty urinating, dysuria, flank pain, frequency, hematuria and urgency  Musculoskeletal: Negative for back pain, gait problem, joint swelling, myalgias, neck pain and neck stiffness  Skin: Negative for pallor and rash  Neurological: Negative for dizziness, tremors, syncope, speech difficulty, weakness, numbness and headaches  Hematological: Negative for adenopathy  Psychiatric/Behavioral: Negative for confusion, hallucinations, self-injury and suicidal ideas  The patient is not nervous/anxious  Physical Exam  Physical Exam  Vitals and nursing note reviewed  Constitutional:       General: She is not in acute distress  Appearance: She is well-developed  She is not diaphoretic  HENT:      Head: Normocephalic and atraumatic  Right Ear: External ear normal       Left Ear: External ear normal       Nose: Nose normal    Eyes:      Conjunctiva/sclera: Conjunctivae normal       Pupils: Pupils are equal, round, and reactive to light  Cardiovascular:      Rate and Rhythm: Normal rate and regular rhythm  Heart sounds: Normal heart sounds  Pulmonary:      Effort: Pulmonary effort is normal       Breath sounds: Examination of the right-upper field reveals wheezing  Examination of the left-upper field reveals wheezing  Examination of the right-middle field reveals wheezing  Examination of the left-middle field reveals wheezing  Examination of the right-lower field reveals wheezing  Examination of the left-lower field reveals wheezing  Wheezing present  Abdominal:      General: Bowel sounds are normal       Palpations: Abdomen is soft  Musculoskeletal:         General: Normal range of motion  Cervical back: Normal range of motion and neck supple  Skin:     General: Skin is warm and dry     Neurological:      Mental Status: She is alert and oriented to person, place, and time  Deep Tendon Reflexes: Reflexes are normal and symmetric           Vital Signs  ED Triage Vitals   Temperature Pulse Respirations Blood Pressure SpO2   03/02/23 1715 03/02/23 1715 03/02/23 1715 03/02/23 1715 03/02/23 1715   97 6 °F (36 4 °C) 88 18 (!) 177/74 97 %      Temp src Heart Rate Source Patient Position - Orthostatic VS BP Location FiO2 (%)   -- 03/02/23 1908 03/02/23 1908 03/02/23 1908 --    Monitor Lying Left arm       Pain Score       03/02/23 1908       No Pain           Vitals:    03/02/23 1715 03/02/23 1908   BP: (!) 177/74 149/63   Pulse: 88 76   Patient Position - Orthostatic VS:  Lying         Visual Acuity      ED Medications  Medications   ipratropium-albuterol (DUO-NEB) 0 5-2 5 mg/3 mL inhalation solution 3 mL (3 mL Nebulization Given 3/2/23 1805)   sodium chloride 0 9 % bolus 500 mL (0 mL Intravenous Stopped 3/2/23 2101)   iohexol (OMNIPAQUE) 350 MG/ML injection (SINGLE-DOSE) 66 mL (66 mL Intravenous Given 3/2/23 2002)   azithromycin (ZITHROMAX) tablet 500 mg (500 mg Oral Given 3/2/23 2103)       Diagnostic Studies  Results Reviewed     Procedure Component Value Units Date/Time    HS Troponin I 2hr [[id number]]  (Normal) Collected: 03/02/23 2005    Lab Status: Final result Specimen: Blood from Arm, Right Updated: 03/02/23 2043     hs TnI 2hr 4 ng/L      Delta 2hr hsTnI 0 ng/L     Urine Microscopic [[id number]]  (Abnormal) Collected: 03/02/23 1920    Lab Status: Final result Specimen: Urine, Clean Catch Updated: 03/02/23 2000     RBC, UA 1-2 /hpf      WBC, UA 10-20 /hpf      Epithelial Cells Occasional /hpf      Bacteria, UA Occasional /hpf      OTHER OBSERVATIONS WBCs Clumped  Transitional Epithelial Cells    HS Troponin I 4hr [[id number]]     Lab Status: No result Specimen: Blood     UA (URINE) with reflex to Scope [[id number]]  (Abnormal) Collected: 03/02/23 1920    Lab Status: Final result Specimen: Urine, Clean Catch Updated: 03/02/23 1926     Color, UA Light Yellow Clarity, UA Clear     Specific Gravity, UA 1 015     pH, UA 5 5     Leukocytes, UA Moderate     Nitrite, UA Negative     Protein, UA Negative mg/dl      Glucose, UA Negative mg/dl      Ketones, UA Negative mg/dl      Urobilinogen, UA 0 2 E U /dl      Bilirubin, UA Negative     Occult Blood, UA Moderate    FLU/RSV/COVID - if FLU/RSV clinically relevant [[id number]]  (Normal) Collected: 03/02/23 1755    Lab Status: Final result Specimen: Nares from Nose Updated: 03/02/23 7227     SARS-CoV-2 Negative     INFLUENZA A PCR Negative     INFLUENZA B PCR Negative     RSV PCR Negative    Narrative:      FOR PEDIATRIC PATIENTS - copy/paste COVID Guidelines URL to browser: [email or web address]  "[employer]x    SARS-CoV-2 assay is a Nucleic Acid Amplification assay intended for the  qualitative detection of nucleic acid from SARS-CoV-2 in nasopharyngeal  swabs  Results are for the presumptive identification of SARS-CoV-2 RNA  Positive results are indicative of infection with SARS-CoV-2, the virus  causing COVID-19, but do not rule out bacterial infection or co-infection  with other viruses  Laboratories within the [place] and its  territories are required to report all positive results to the appropriate  public health authorities  Negative results do not preclude SARS-CoV-2  infection and should not be used as the sole basis for treatment or other  patient management decisions  Negative results must be combined with  clinical observations, patient history, and epidemiological information  This test has not been FDA cleared or approved  This test has been authorized by FDA under an Emergency Use Authorization  (EUA)   This test is only authorized for the duration of time the  declaration that circumstances exist justifying the authorization of the  emergency use of an in vitro diagnostic tests for detection of SARS-CoV-2  virus and/or diagnosis of COVID-19 infection under section 564(b)(1) of  the Act, 21 U  S C  301NRY-2(Z)(9), unless the authorization is terminated  or revoked sooner  The test has been validated but independent review by FDA  and CLIA is pending  Test performed using [employer] GeneXpert: This RT-PCR assay targets N2,  a region unique to SARS-CoV-2  A conserved region in the E-gene was chosen  for pan-Sarbecovirus detection which includes SARS-CoV-2  According to CMS-2020-01-R, this platform meets the definition of high-throughput technology      Comprehensive metabolic panel [[id number]]  (Abnormal) Collected: 03/02/23 1755    Lab Status: Final result Specimen: Blood from Arm, Right Updated: 03/02/23 1845     Sodium 140 mmol/L      Potassium 4 2 mmol/L      Chloride 99 mmol/L      CO2 33 mmol/L      ANION GAP 8 mmol/L      BUN 50 mg/dL      Creatinine 1 64 mg/dL      Glucose 180 mg/dL      Calcium 8 4 mg/dL      AST 16 U/L      ALT 13 U/L      Alkaline Phosphatase 63 U/L      Total Protein 6 9 g/dL      Albumin 3 6 g/dL      Total Bilirubin 0 27 mg/dL      eGFR 31 ml/min/1 73sq m     Narrative:      Specimen Lipemic; Results May be Affected  National Kidney Disease Foundation guidelines for Chronic Kidney Disease (CKD):   •  Stage 1 with normal or high GFR (GFR > 90 mL/min/1 73 square meters)  •  Stage 2 Mild CKD (GFR = 60-89 mL/min/1 73 square meters)  •  Stage 3A Moderate CKD (GFR = 45-59 mL/min/1 73 square meters)  •  Stage 3B Moderate CKD (GFR = 30-44 mL/min/1 73 square meters)  •  Stage 4 Severe CKD (GFR = 15-29 mL/min/1 73 square meters)  •  Stage 5 End Stage CKD (GFR <15 mL/min/1 73 square meters)  Note: GFR calculation is accurate only with a steady state creatinine    B-Type Natriuretic Peptide(BNP) [[id number]]  (Normal) Collected: 03/02/23 1755    Lab Status: Final result Specimen: Blood from Arm, Right Updated: 03/02/23 1835     BNP 35 pg/mL     HS Troponin 0hr (reflex protocol) [[id number]]  (Normal) Collected: 03/02/23 1755    Lab Status: Final result Specimen: Blood from Arm, Right Updated: 03/02/23 1834     hs TnI 0hr 4 ng/L     D-Dimer [[id number]]  (Abnormal) Collected: 03/02/23 1755    Lab Status: Final result Specimen: Blood from Arm, Right Updated: 03/02/23 1833     D-Dimer, Quant 1 03 ug/ml FEU     Narrative: In the evaluation for possible pulmonary embolism, in the appropriate (Well's Score of 4 or less) patient, the age adjusted d-dimer cutoff for this patient can be calculated as:    Age x 0 01 (in ug/mL) for Age-adjusted D-dimer exclusion threshold for a patient over 50 years  Protime-INR [[id number]]  (Normal) Collected: 03/02/23 1755    Lab Status: Final result Specimen: Blood from Arm, Right Updated: 03/02/23 1828     Protime 14 2 seconds      INR 1 09    APTT [[id number]]  (Normal) Collected: 03/02/23 1755    Lab Status: Final result Specimen: Blood from Arm, Right Updated: 03/02/23 1828     PTT 30 seconds     CBC and differential [[id number]]  (Abnormal) Collected: 03/02/23 1755    Lab Status: Final result Specimen: Blood from Arm, Right Updated: 03/02/23 1804     WBC 8 37 Thousand/uL      RBC 3 70 Million/uL      Hemoglobin 11 2 g/dL      Hematocrit 35 1 %      MCV 95 fL      MCH 30 3 pg      MCHC 31 9 g/dL      RDW 14 2 %      MPV 10 9 fL      Platelets 928 Thousands/uL      nRBC 0 /100 WBCs      Neutrophils Relative 57 %      Immat GRANS % 0 %      Lymphocytes Relative 27 %      Monocytes Relative 9 %      Eosinophils Relative 6 %      Basophils Relative 1 %      Neutrophils Absolute 4 72 Thousands/µL      Immature Grans Absolute 0 03 Thousand/uL      Lymphocytes Absolute 2 25 Thousands/µL      Monocytes Absolute 0 79 Thousand/µL      Eosinophils Absolute 0 50 Thousand/µL      Basophils Absolute 0 08 Thousands/µL                  CTA ED chest PE study   Final Result by Katerina Espinal MD (03/02 2048)      Mild tree-in-bud nodularity at the right lower lobe consistent with respiratory bronchiolitis secondary to infection or aspiration  Otherwise stable scattered pulmonary nodules measuring up to 6 mm as detailed above  Workstation performed: [id number]         XR chest 1 view portable    (Results Pending)              Procedures  Procedures         ED Course  ED Course as of 03/02/23 2105   Thu Mar 02, 2023   1853 D-Dimer, Quant(!): 1 03                                             Medical Decision Making  Patient with history of CHF, COPD, pneumonia presents with cough and shortness of breath x4 days  -We will get chest x-ray labs and EKG as well as start with albuterol/Atrovent nebulizer  Patient's chest x-ray is normal, BNP is within normal range  Patient states the nebulizer did help a little bit but still feels short of breath  Her D-dimer is elevated to 1 05, GFR is only 31 therefore cannot do PE study at this point    I discussed the possibility of Lovenox and VQ scan tomorrow versus CT scan with contrast where her GFR is greater than 30 giving her some fluid and doing the CT scan if PE is negative patient will build to go home she does prefer that method    Bronchitis: acute illness or injury  Dyspnea, unspecified type: acute illness or injury  Amount and/or Complexity of Data Reviewed  Labs: ordered  Decision-making details documented in ED Course  Radiology: ordered and independent interpretation performed  Details: Chest x-ray was read as negative by myself  Discussion of management or test interpretation with external provider(s): Risk of worsening kidney function with CT scan to rule out PE however PE could be fatal and patient is agreeable to checking for pulmonary embolism and increasing her fluid intake for the next 24 hours to get rid of the contrast     Risk  Prescription drug management  Decision regarding hospitalization            Disposition  Final diagnoses:   Dyspnea, unspecified type   Bronchitis     Time reflects when diagnosis was documented in both MDM as applicable and the Disposition within this note     Time User Action Codes Description Comment    3/2/2023  7:19 PM Nohemi Howard Add [R06 00] Dyspnea, unspecified type     3/2/2023  9:03 PM Nohemi Howard Add [J40] Bronchitis       ED Disposition     ED Disposition   Discharge    Condition   Stable    Date/Time   Thu Mar 2, 2023  9:03 PM    Comment          Follow-up Information     Follow up With Specialties Details Why Contact Christopher Suárez MD Family Medicine Schedule an appointment as soon as possible for a visit  As needed [facility]  [address] [place] [address]  [phone]            Patient's Medications   Discharge Prescriptions    AZITHROMYCIN (ZITHROMAX Z-BC) 250 MG TABLET    Take 2 tablets today then 1 tablet daily x 4 days       Start Date: 3/2/2023  End Date: 3/6/2023       Order Dose: --       Quantity: 6 tablet    Refills: 0       No discharge procedures on file      PDMP Review       Value Time User    PDMP Reviewed  Yes 12/27/2022 12:15 PM Sergio Garcia          ED Provider  Electronically Signed by           Tolu Reyes DO  03/02/23 2104       Tolu Reyes DO  03/02/23 2106

## 2023-03-02 NOTE — Clinical Note
Case was discussed with Nancy  and the patient's admission status was agreed to be Admission Status: observation status to the service of Dr Anna Chambers

## 2023-03-02 NOTE — ASSESSMENT & PLAN NOTE
SUBJECTIVE  Patient ID: Aleja Ratliff is a 38 year old female.  3/2/2023    Chief Complaint   Patient presents with   • Office Visit   • Annual Exam     Annual physical Exam   None- fasting      Here for annual exam. Feeling well.    Depression: sx well controlled on wellbutrin. Tried to cut back last year to once a day but needed to increase back to BID.     Vaping most days.    Pap 7/21 with gyne - normal.   Exercising 3 days per week  declines Pneumococcal 23 and Influenza vaccines   Received COVID vaccines, but no booster.           Aleja has a past medical history of Epigastric pain (11/20/2019), GERD (gastroesophageal reflux disease) (12/18/2015), History of Helicobacter infection (3/10/2020), and Vaginal delivery.  Aleja has Reactive airway disease; Hemorrhoids; Annual physical exam; Vitamin D deficiency; Needs smoking cessation education; Depression; and Allergic rhinitis on their problem list.  Aleja has a past surgical history that includes adenoidectomy.  Her family history includes Alcohol Abuse in her father and paternal grandmother; Hypertension in her mother; Hypothyroid in her sister; Stroke/TIA in her paternal grandmother.  Aleja reports that she has quit smoking. Her smoking use included cigarettes. She smoked an average of .25 packs per day. She has never used smokeless tobacco. She reports current alcohol use of about 1.0 - 2.0 standard drink per week. She reports that she does not use drugs.  Aleja has a current medication list which includes the following prescription(s): bupropion, metronidazole, fluconazole, levocetirizine, albuterol, and hydrocortisone.  Aleja   Current Outpatient Medications   Medication Sig Dispense Refill   • buPROPion (WELLBUTRIN SR) 150 MG 12 hr tablet TAKE 1 TABLET BY MOUTH ONCE DAILY FOR 3 DAYS, INCREASE  MG TWICE DAILY 180 tablet 3   • metroNIDAZOLE (METROGEL-VAGINAL) 0.75 % vaginal gel Place 1 applicator vaginally at bedtime. Place 1 applicator vaginally  · likely secondary to sepsis   · responded to Cardizem 15 mg IV x 1   · changed Lopressor to 25 mg p o  Q 6 hours due to soft BP  Changed back to 50 mg bid for discharge planning  · Continue Eliquis  · Telemetry sinus rhythm to uncontrolled AFib  Discontinued    · Optimize electrolytes for 5 nights. 70 g 0   • fluconazole (Diflucan) 150 MG tablet Take one tablet today and repeat in 72 hours 2 tablet 0   • levocetirizine (Xyzal Allergy 24HR) 5 MG tablet Take 1 tablet by mouth daily. 90 tablet 3   • albuterol 108 (90 Base) MCG/ACT inhaler INHALE 1 TO 2 PUFFS INTO THE LUNGS EVERY 4 TO 6 HOURS AS NEEDED FOR SHORTNESS OF BREATH OR WHEEZING 8.5 g 3   • hydroCORTisone (ANUSOL-HC) 25 MG suppository Place 1 suppository rectally 2 times daily. 12 each 2     No current facility-administered medications for this visit.     Aleja is allergic to clindamycin and tetracycline.    Review of Systems   Constitutional: Negative for appetite change, chills, fatigue, fever and unexpected weight change.   HENT: Negative for ear pain, hearing loss, sinus pressure, sinus pain and trouble swallowing.    Eyes: Negative for pain, redness and visual disturbance.   Respiratory: Negative for cough, chest tightness, shortness of breath and wheezing.    Cardiovascular: Negative for chest pain, palpitations and leg swelling.   Gastrointestinal: Negative for abdominal pain, anal bleeding, blood in stool, constipation, diarrhea, nausea, rectal pain and vomiting.   Endocrine: Negative for cold intolerance, heat intolerance, polydipsia, polyphagia and polyuria.   Genitourinary: Negative for dysuria, frequency, hematuria and urgency.   Musculoskeletal: Negative for arthralgias (left shoulder/clavicular pain), back pain and joint swelling.   Skin: Negative for rash and wound.   Allergic/Immunologic: Negative for environmental allergies.   Neurological: Negative for dizziness, seizures, weakness, numbness and headaches.   Hematological: Does not bruise/bleed easily.   Psychiatric/Behavioral: Negative for decreased concentration, dysphoric mood (mild), self-injury, sleep disturbance and suicidal ideas (no plan). The patient is not nervous/anxious.    All other systems reviewed and are negative.      OBJECTIVE  Physical Exam  Vitals and  nursing note reviewed.   Constitutional:       General: She is not in acute distress.     Appearance: Normal appearance. She is well-developed and well-groomed.   HENT:      Head: Normocephalic and atraumatic.      Right Ear: Tympanic membrane, ear canal and external ear normal.      Left Ear: Tympanic membrane, ear canal and external ear normal.      Nose: Nose normal.      Mouth/Throat:      Lips: Pink.      Mouth: Mucous membranes are moist.      Pharynx: Oropharynx is clear. Uvula midline.      Tonsils: No tonsillar exudate.   Eyes:      General: Lids are normal.      Extraocular Movements:      Right eye: Normal extraocular motion.      Left eye: Normal extraocular motion.      Conjunctiva/sclera: Conjunctivae normal.      Pupils: Pupils are equal, round, and reactive to light.   Neck:      Thyroid: No thyroid mass or thyromegaly.      Trachea: Trachea normal.   Cardiovascular:      Rate and Rhythm: Normal rate and regular rhythm.      Pulses: Normal pulses.      Heart sounds: Normal heart sounds, S1 normal and S2 normal. No murmur heard.  Pulmonary:      Effort: Pulmonary effort is normal.      Breath sounds: Normal breath sounds and air entry.   Chest:   Breasts:     Right: Normal.      Left: Normal.   Abdominal:      General: Abdomen is flat. Bowel sounds are normal.      Palpations: Abdomen is soft.      Tenderness: There is no abdominal tenderness.      Hernia: No hernia is present.   Genitourinary:     Vagina: Normal.   Musculoskeletal:         General: Normal range of motion.      Cervical back: Full passive range of motion without pain, normal range of motion and neck supple.      Right lower leg: No edema.      Left lower leg: No edema.   Lymphadenopathy:      Cervical: No cervical adenopathy.      Upper Body:      Right upper body: No supraclavicular, axillary or pectoral adenopathy.      Left upper body: No supraclavicular, axillary or pectoral adenopathy.   Skin:     General: Skin is warm and dry.       Findings: No rash.   Neurological:      General: No focal deficit present.      Mental Status: She is alert and oriented to person, place, and time. Mental status is at baseline.      Cranial Nerves: No cranial nerve deficit.      Sensory: No sensory deficit.      Deep Tendon Reflexes: Reflexes are normal and symmetric.   Psychiatric:         Attention and Perception: Attention and perception normal.         Mood and Affect: Mood and affect normal.         Speech: Speech normal.         Behavior: Behavior normal. Behavior is cooperative.         Thought Content: Thought content includes suicidal (thoughts that she would be better off dead, but no plan. states she would never do anything like that because of her son) ideation. Thought content does not include homicidal ideation. Thought content does not include homicidal or suicidal plan.         Cognition and Memory: Cognition and memory normal.         Judgment: Judgment normal.         ASSESSMENT  Problem List Items Addressed This Visit    None       Pap smears and HPV testing as according to ACOG guidelines.  Fasting lipid panel and glucose  Also recommend the following:  Healthy eating habits, Increase exercise, Adequate intake of dietary calcium and Breast awareness  Continue with yearly breast and pelvic exams.  Thyroid studies     Health Maintenance Summary     Hepatitis B Vaccine (1 of 3 - 3-dose series)  Overdue - never done    Pneumococcal Vaccine 0-64 (1 - PCV)  Overdue - never done    COVID-19 Vaccine (3 - Booster for Pfizer series)  Overdue since 4/11/2022    Influenza Vaccine (1)  Overdue since 9/1/2022    Depression Screening (Yearly)  Due soon on 3/3/2023    Varicella Vaccine (1 of 2 - 2-dose childhood series)  Postponed until 6/14/2024    Cervical Cancer Screen 30-64 - (PAP/HPV Co-Testing - Every 5 Years)  Next due on 7/1/2026    DTaP/Tdap/Td Vaccine (2 - Td or Tdap)  Next due on 2/7/2027    Meningococcal Vaccine   Aged Out    HPV Vaccine    Aged Out          Schedule follow up: 6 weeks    Dana Purdy, CNP

## 2023-03-03 ENCOUNTER — VBI (OUTPATIENT)
Dept: INTERNAL MEDICINE CLINIC | Facility: CLINIC | Age: 73
End: 2023-03-03

## 2023-03-03 NOTE — TELEPHONE ENCOUNTER
Sally Torrez    ED Visit Information     Ed visit date: 3-2-23  Diagnosis Description: Dyspnea, unspecified type; Bronchitis  In Network? Yes [facility]  Discharge status: Home  Discharged with meds ? Yes  Number of ED visits to date: 2  ED Severity:N/A     Outreach Information    Outreach successful: Yes 1  Date letter mailed:0  Date Finalized:3-3-23    Care Coordination    Follow up appointment with pcp: yes PULMONARY 3-8-23 and PCP appt 3-16-23  Transportation issues ? No    [employer] type: 7 Day Outreach  Emergent necessity warranted by diagnosis: Yes  [facility] PCP: Yes  Transportation: Friend/Family Transport  Called PCP first?: Yes  Told to go to ED by PCP?: Yes  Same-Day or Next Day Appointment Offered?: No  Would have used same-day or next-day if offered?: Yes  Feels able to call PCP for urgent problems ?: Yes  Understands what emergencies can be handled by PCP ?: Yes  Ever any problems getting appointment with PCP for minor emergency/urgency problems?: No  Practice Contacted Patient ?: No  Pt had ED follow up with pcp/staff ?: No    Seen for follow-up out of network ?: No  Reason Patient went to ED instead of Urgent Care or PCP?: Perceived Severity of Illness, PCP instructions  Urgent care Education?: No  03/03/2023 12:44 PM EST by Addis Ureña 03/03/2023 12:44 PM EST by Dallas Le (Self) [phone] ([place])[phone] (Home)  [phone] (Home) Remove     Personal communication with patient regarding recent ED visit on 3-2-23   Patient states that she is doing better today   Patient is scheduled for appt with PULMONARY 3-8-23 and PCP appt 3-16-23

## 2023-03-04 LAB
ATRIAL RATE: 70 BPM
P AXIS: 20 DEGREES
PR INTERVAL: 192 MS
QRS AXIS: -31 DEGREES
QRSD INTERVAL: 76 MS
QT INTERVAL: 432 MS
QTC INTERVAL: 466 MS
T WAVE AXIS: 16 DEGREES
VENTRICULAR RATE: 70 BPM

## 2023-03-08 ENCOUNTER — OFFICE VISIT (OUTPATIENT)
Dept: PULMONOLOGY | Facility: MEDICAL CENTER | Age: 73
End: 2023-03-08

## 2023-03-08 VITALS
HEIGHT: 62 IN | OXYGEN SATURATION: 93 % | WEIGHT: 261 LBS | RESPIRATION RATE: 12 BRPM | TEMPERATURE: 96.6 F | DIASTOLIC BLOOD PRESSURE: 58 MMHG | BODY MASS INDEX: 48.03 KG/M2 | SYSTOLIC BLOOD PRESSURE: 122 MMHG | HEART RATE: 73 BPM

## 2023-03-08 DIAGNOSIS — E66.01 MORBID OBESITY WITH BMI OF 45.0-49.9, ADULT (HCC): Chronic | ICD-10-CM

## 2023-03-08 DIAGNOSIS — J96.11 CHRONIC RESPIRATORY FAILURE WITH HYPOXIA AND HYPERCAPNIA (HCC): ICD-10-CM

## 2023-03-08 DIAGNOSIS — G47.33 OSA (OBSTRUCTIVE SLEEP APNEA): Primary | ICD-10-CM

## 2023-03-08 DIAGNOSIS — I50.32 CHRONIC HEART FAILURE WITH PRESERVED EJECTION FRACTION (HCC): ICD-10-CM

## 2023-03-08 DIAGNOSIS — J96.12 CHRONIC RESPIRATORY FAILURE WITH HYPOXIA AND HYPERCAPNIA (HCC): ICD-10-CM

## 2023-03-08 DIAGNOSIS — E66.2 OBESITY HYPOVENTILATION SYNDROME (HCC): ICD-10-CM

## 2023-03-08 DIAGNOSIS — R91.1 LUNG NODULE: ICD-10-CM

## 2023-03-08 PROBLEM — J44.9 COPD (CHRONIC OBSTRUCTIVE PULMONARY DISEASE) (HCC): Status: RESOLVED | Noted: 2023-01-22 | Resolved: 2023-03-08

## 2023-03-08 NOTE — ASSESSMENT & PLAN NOTE
We discussed Ann's obesity hypoventilation, obstructive sleep apnea, and chronic hypoxic hypercapnic respiratory failure  She has not been wearing her BiPAP because the mask does not fit her and she feels that her pressure settings are too high  She did have an overnight pulse ox done 6 months ago, but it was not done on room air with BiPAP, but rather supplemental oxygen  I discussed with her compliance with her BiPAP and risk factors should she not treat her hypoventilation/KATRINA/hypercapnia  She verbalized understanding  She will undergo a mask fit and try to increase compliance with her current machine  She will also subsequently undergo split-night sleep study in a few months if she is unable to gain compliance with her current BiPAP  Her BiPAP was ordered for her while she was in the hospital and has had trouble with it since

## 2023-03-08 NOTE — PROGRESS NOTES
Pulmonary Follow-Up Note   Chad Alonso 81 y o  female MRN: [id number]  3/8/2023      Assessment/Plan:    Problem List Items Addressed This Visit        Respiratory    Chronic respiratory failure with hypoxia and hypercapnia (HCC) (Chronic)     She will continue with her supplemental oxygen and will take her BiPAP machine with her to her mask fitting  She will ask them to show her how to bleed her oxygen through her BiPAP  Relevant Orders    Mask fitting only    CPAP Study    Split Study    KATRINA (obstructive sleep apnea) - Primary    Relevant Orders    Mask fitting only    CPAP Study    Split Study       Other    Morbid obesity with BMI of 45 0-49 9, adult (HCC) (Chronic)     Lifestyle modifications and weight loss as able  Lung nodule     Has pulmonary nodules stable on imaging  She was originally due for repeat CT of the chest for this which was scheduled for tomorrow  I advised her that she does not need to get this done  She did have some right lower lobe tree-in-bud opacities noted on her CT in the ER last week when she was ill  At her follow-up in 3 months, we can determine when she needs repeat imaging  Obesity hypoventilation syndrome (HCC)     We discussed Ann's obesity hypoventilation, obstructive sleep apnea, and chronic hypoxic hypercapnic respiratory failure  She has not been wearing her BiPAP because the mask does not fit her and she feels that her pressure settings are too high  She did have an overnight pulse ox done 6 months ago, but it was not done on room air with BiPAP, but rather supplemental oxygen  I discussed with her compliance with her BiPAP and risk factors should she not treat her hypoventilation/KATRINA/hypercapnia  She verbalized understanding  She will undergo a mask fit and try to increase compliance with her current machine    She will also subsequently undergo split-night sleep study in a few months if she is unable to gain compliance with her current BiPAP  Her BiPAP was ordered for her while she was in the hospital and has had trouble with it since  Relevant Orders    Mask fitting only    CPAP Study    Split Study   Other Visit Diagnoses     Chronic heart failure with preserved ejection fraction [facility])              Education provided at this visit:   Smoking Cessation: Quit 1996    Return in about 3 months (around 6/8/2023), or if symptoms worsen or fail to improve  All of Ann's questions were answered prior to leaving the office today  She will follow-up with Dr Otilio Stephens in 3-4 months or sooner should the need arise  She is aware to call our office with any further questions or concerns  History of Present Illness   Reason for Visit: Follow-up  Chief Complaint: "I'm okay "  HPI: Sheryle Blank is a 81 y o  female who presents to the office today for follow-up  She reports that she was in the ER last week for a cough and was diagnosed with bronchitis  She reports her symptoms have resolved  She has seen this in the past for obesity hypoventilation and chronic hypoxic hypercapnic respiratory failure  She was qualified for BiPAP during a hospitalization in May 2022  Since that time, she has been having difficulty with the machine  She reports that her mask does not fit well and she also feels that she has too much pressure when she uses it  She does not wear it anymore and is using nasal cannula oxygen  She still has the machine  Aside from the above, no other complaints  Review of Systems   All other systems reviewed and are negative  A full 12-point review of systems was completed and is negative except for those outlined in the HPI      Historical Information   Past Medical History:   Diagnosis Date   • SEBASTIAN (acute kidney injury) ([facility] ) 1/23/2023   • Anemia    • Asthma    • Chronic kidney disease    • COPD (chronic obstructive pulmonary disease) ([facility] )    • COVID-19     January 2022   • Diabetes mellitus ([facility] )    • GERD (gastroesophageal reflux disease)    • Hyperlipidemia    • Hypertension    • PONV (postoperative nausea and vomiting)    • SVT (supraventricular tachycardia) ([facility])      Past Surgical History:   Procedure Laterality Date   • APPENDECTOMY     • BREAST BIOPSY Right     years ago when she was 23   • CYSTOSCOPY W/ LASER LITHOTRIPSY Left 2016    Procedure: CYSTOSCOPY URETEROSCOPY WITH LITHOTRIPSY HOLMIUM LASER, RETROGRADE PYELOGRAM AND INSERTION STENT URETERAL;  Surgeon: Jessica Langford MD;  Location: [address] [place] [address];  Service:    • DILATION AND CURETTAGE OF UTERUS     • IR THORACENTESIS  2022   • JOINT REPLACEMENT      right knee   • KNEE ARTHROPLASTY Right    • WA ARTHROPLASTY GLENOHUMERAL JOINT TOTAL SHOULDER Left 2022    Procedure: ARTHROPLASTY SHOULDER REVERSE;  Surgeon: Krystian Retana MD;  Location: [facility];  Service: Orthopedics   • WA CYSTO BLADDER W/URETERAL CATHETERIZATION Left 2016    Procedure: CYSTOSCOPY RETROGRADE PYELOGRAM WITH INSERTION STENT URETERAL, left;  Surgeon: Jessica Langford MD;  Location: [facility];  Service: Urology   • SHOULDER ARTHROTOMY Left      Family History   Problem Relation Age of Onset   • Heart disease Mother    • Diabetes Father    • Thyroid cancer Sister    • Emphysema Maternal Grandmother    • Heart disease Family      Social History   Social History     Substance and Sexual Activity   Alcohol Use Not Currently    Comment: rarely     Social History     Substance and Sexual Activity   Drug Use Never     Social History     Tobacco Use   Smoking Status Former   • Packs/day: 1 00   • Years: 20 00   • Pack years: 20 00   • Types: Cigarettes   • Quit date: 1996   • Years since quittin 6   Smokeless Tobacco Never     E-Cigarette/Vaping   • E-Cigarette Use Never User      E-Cigarette/Vaping Substances   • Nicotine No    • THC No    • CBD No    • Flavoring No    • Other No    • Unknown No        Meds/Allergies     Current Outpatient Medications:   •  albuterol (PROVENTIL HFA,VENTOLIN HFA) 90 mcg/act inhaler, Inhale 2 puffs every 6 (six) hours as needed for wheezing, Disp: 8 g, Rfl: 4  •  ammonium lactate (LAC-HYDRIN) 12 % lotion, APPLY TOPICALLY TO AFFECTED AREAS twice a day, Disp: , Rfl:   •  apixaban (ELIQUIS) 5 mg, Take 5 mg by mouth 2 (two) times a day, Disp: , Rfl:   •  B Complex-C-Folic Acid (triphrocaps) 1 MG CAPS, take 1 capsule by mouth once daily, Disp: 30 capsule, Rfl: 5  •  Continuous Blood Gluc  (FreeStyle Dawn 2 Otto) NORM, Use 1 Units continuous, Disp: 1 each, Rfl: 0  •  Continuous Blood Gluc Sensor (FreeStyle Dawn 2 Sensor) MISC, Use 1 Units every 14 (fourteen) days, Disp: 2 each, Rfl: 5  •  docusate sodium (COLACE) 100 mg capsule, Take 100 mg by mouth in the morning, Disp: , Rfl:   •  fluticasone (FLONASE) 50 mcg/act nasal spray, 1 spray into each nostril daily, Disp: , Rfl: 0  •  glucose blood test strip, Use 1 each 2 (two) times a day Use one 2 times daily, Disp: 200 strip, Rfl: 5  •  insulin glargine (Toujeo SoloStar) 300 units/mL CONCENTRATED U-300 injection pen (1-unit dial), Inject 25 Units under the skin every 12 (twelve) hours, Disp: , Rfl:   •  insulin lispro (HumaLOG KwikPen) 100 units/mL injection pen, Use 10 units prior to each meal +1 unit per 50 above 150 mg/dL, Disp: 15 mL, Rfl: 2  •  Insulin Pen Needle (BD Pen Needle Pilar 2nd Gen) 32G X 4 MM MISC, For use with insulin pen   Pharmacy may dispense brand covered by insurance , Disp: 100 each, Rfl: 0  •  Insulin Pen Needle (NovoFine Autocover) 30G X 8 MM MISC, Inject under the skin daily, Disp: 100 each, Rfl: 3  •  Insulin Pen Needle 30G X 8 MM MISC, 2 (two) times a day, Disp: , Rfl:   •  Lancets (onetouch ultrasoft) lancets, Use once daily, Disp: 100 each, Rfl: 2  •  metoprolol succinate (TOPROL-XL) 50 mg 24 hr tablet, Take 1 tablet (50 mg total) by mouth daily, Disp: 30 tablet, Rfl: 3  •  montelukast (SINGULAIR) 10 mg tablet, Take 10 mg by mouth daily at bedtime, Disp: , Rfl:   •  NovoFine Autocover Pen Needle 30G X 8 MM MISC, USE TWICE A DAY, Disp: 300 each, Rfl: 5  •  pregabalin (LYRICA) 100 mg capsule, Take 100 mg by mouth 2 (two) times a day, Disp: , Rfl:   •  rosuvastatin (CRESTOR) 10 MG tablet, take 1 tablet by mouth once daily, Disp: 90 tablet, Rfl: 1  •  semaglutide, 0 25 or 0 5 mg/dose, (Ozempic, 0 25 or 0 5 MG/DOSE,) 2 mg/1 5 mL injection pen, Use 0 25mg weekly for 4 weeks and then 0 5mg weekly, Disp: 6 mL, Rfl: 1  •  torsemide (DEMADEX) 20 mg tablet, Take 2 tablets in the AM, Disp: 90 tablet, Rfl: 1  •  Insulin Pen Needle 31G X 8 MM MISC, Use daily Inject under the skin, Disp: 100 each, Rfl: 2  Allergies   Allergen Reactions   • Penicillins Hives   • Moxifloxacin Other (See Comments)     unknown   • Percocet [Oxycodone-Acetaminophen] Other (See Comments)     GI upset   • Zinc Acetate Other (See Comments)     unknown   • Penicillins Hives   • Asa [Aspirin] GI Intolerance   • Indocin [Indomethacin] Other (See Comments)     Made patient "loopy"   • Other Other (See Comments)     unknown   • Tannic Acid Rash       Vitals: Blood pressure 122/58, pulse 73, temperature (!) 96 6 °F (35 9 °C), temperature source Temporal, resp  rate 12, height 5' 2" (1 575 m), weight 118 kg (261 lb), SpO2 93 %, not currently breastfeeding  Body mass index is 47 74 kg/m²  Oxygen Therapy  SpO2: 93 %    Physical Exam:  Physical Exam  Vitals reviewed  Constitutional:       General: She is not in acute distress  Appearance: She is well-developed  She is morbidly obese  She is not toxic-appearing or diaphoretic  HENT:      Head: Normocephalic and atraumatic  Eyes:      General: No scleral icterus  Neck:      Trachea: No tracheal deviation  Cardiovascular:      Rate and Rhythm: Normal rate and regular rhythm  Heart sounds: S1 normal and S2 normal  No murmur heard  No friction rub  No gallop     Pulmonary:      Effort: Pulmonary effort is normal  No tachypnea, accessory muscle usage or respiratory distress  Breath sounds: Normal breath sounds  No stridor  No decreased breath sounds, wheezing, rhonchi or rales  Chest:      Chest wall: No tenderness  Abdominal:      General: Bowel sounds are normal  There is no distension  Palpations: Abdomen is soft  Tenderness: There is no abdominal tenderness  Musculoskeletal:      Cervical back: Neck supple  Right lower leg: No edema  Left lower leg: No edema  Skin:     General: Skin is warm and dry  Findings: No rash  Neurological:      Mental Status: She is alert and oriented to person, place, and time  GCS: GCS eye subscore is 4  GCS verbal subscore is 5  GCS motor subscore is 6  Psychiatric:         Speech: Speech normal          Behavior: Behavior normal  Behavior is cooperative  CT chest done last week in the ER shows right lower lobe tree-in-bud opacity with stable lung nodules  RK Rankin  [facility]        Portions of the record may have been created with voice recognition software  Occasional wrong word or "sound a like" substitutions may have occurred due to the inherent limitations of voice recognition software  Read the chart carefully and recognize, using context, where substitutions have occurred or contact the dictating provider

## 2023-03-08 NOTE — ASSESSMENT & PLAN NOTE
She will continue with her supplemental oxygen and will take her BiPAP machine with her to her mask fitting  She will ask them to show her how to bleed her oxygen through her BiPAP

## 2023-03-08 NOTE — ASSESSMENT & PLAN NOTE
Has pulmonary nodules stable on imaging  She was originally due for repeat CT of the chest for this which was scheduled for tomorrow  I advised her that she does not need to get this done  She did have some right lower lobe tree-in-bud opacities noted on her CT in the ER last week when she was ill  At her follow-up in 3 months, we can determine when she needs repeat imaging

## 2023-03-08 NOTE — PROGRESS NOTES
I left a message with patient regarding nocturnal pulse oximetry     BiPAP on room air is normal   There is no supplemental oxygen needed

## 2023-03-09 ENCOUNTER — HOSPITAL ENCOUNTER (OUTPATIENT)
Dept: RADIOLOGY | Facility: HOSPITAL | Age: 73
Discharge: HOME | End: 2023-03-09
Attending: INTERNAL MEDICINE

## 2023-03-09 ENCOUNTER — RA CDI HCC (OUTPATIENT)
Dept: OTHER | Facility: HOSPITAL | Age: 73
End: 2023-03-09

## 2023-03-09 ENCOUNTER — APPOINTMENT (OUTPATIENT)
Dept: RADIOLOGY | Facility: HOSPITAL | Age: 73
End: 2023-03-09
Attending: INTERNAL MEDICINE

## 2023-03-09 VITALS — BODY MASS INDEX: 48.03 KG/M2 | WEIGHT: 261 LBS | HEIGHT: 62 IN

## 2023-03-09 DIAGNOSIS — R92.8 ABNORMAL SCREENING MAMMOGRAM: ICD-10-CM

## 2023-03-09 NOTE — PROGRESS NOTES
Met with patient Durga Cartagena, her niece Nan Singh, and Dr Kulwinder Almanza regarding recommendation for:     __x___ Right ______ Left      ______ Ultrasound guided breast biopsy    __x___ Stereotactic breast biopsy  __x___Verbalized understanding  Blood thinners:  __x___ yes ______ no  *Patient is currently on Eliquis 5mg BID for prevention of blood clots due to history of atrial fibrillation/ SVT  Date stopped: (not applicable)    Biopsy teaching sheet given:  __x___ yes _____ no    Tentative biopsy appointment: Monday 03/13/2023 @ [address] [place] [address] (check-in time @ 9:45am)    Patient is aware she does not need to fast for a breast biopsy procedure, no special soap/ surgical scrubs need to be used the night before or morning of her procedure, and no changes need to be made to her normal medication schedule  Wear a comfortable two piece outfit to your appointment, and wear/ or bring a bra with you to your appointment as this will help hold the ice pack in place that we would like you to use post-procedure  Patient confirms understanding of recommendations  All questions answered to patient's satisfaction at this time

## 2023-03-09 NOTE — PROGRESS NOTES
E11 22, I13 0  [facility] 75  coding opportunities          Chart Reviewed number of suggestions sent to Provider: 2     Patients Insurance     Medicare Insurance: [employer]

## 2023-03-10 LAB
DME PARACHUTE DELIVERY DATE ACTUAL: NORMAL
DME PARACHUTE DELIVERY DATE REQUESTED: NORMAL
DME PARACHUTE DELIVERY NOTE: NORMAL
DME PARACHUTE ITEM DESCRIPTION: NORMAL
DME PARACHUTE ORDER STATUS: NORMAL
DME PARACHUTE SUPPLIER NAME: NORMAL
DME PARACHUTE SUPPLIER PHONE: NORMAL

## 2023-03-13 ENCOUNTER — HOSPITAL ENCOUNTER (OUTPATIENT)
Dept: RADIOLOGY | Facility: HOSPITAL | Age: 73
Discharge: HOME | End: 2023-03-13
Attending: INTERNAL MEDICINE

## 2023-03-13 VITALS — SYSTOLIC BLOOD PRESSURE: 124 MMHG | DIASTOLIC BLOOD PRESSURE: 76 MMHG

## 2023-03-13 DIAGNOSIS — R92.8 ABNORMAL MAMMOGRAM: ICD-10-CM

## 2023-03-13 RX ORDER — LIDOCAINE HYDROCHLORIDE AND EPINEPHRINE 10; 10 MG/ML; UG/ML
10 INJECTION, SOLUTION INFILTRATION; PERINEURAL ONCE
Status: COMPLETED | OUTPATIENT
Start: 2023-03-13 | End: 2023-03-13

## 2023-03-13 RX ORDER — LIDOCAINE HYDROCHLORIDE 10 MG/ML
5 INJECTION, SOLUTION EPIDURAL; INFILTRATION; INTRACAUDAL; PERINEURAL ONCE
Status: COMPLETED | OUTPATIENT
Start: 2023-03-13 | End: 2023-03-13

## 2023-03-13 RX ADMIN — LIDOCAINE HYDROCHLORIDE 5 ML: 10 INJECTION, SOLUTION EPIDURAL; INFILTRATION; INTRACAUDAL; PERINEURAL at 10:48

## 2023-03-13 RX ADMIN — LIDOCAINE HYDROCHLORIDE AND EPINEPHRINE 10 ML: 10; 10 INJECTION, SOLUTION INFILTRATION; PERINEURAL at 10:49

## 2023-03-13 NOTE — PROGRESS NOTES
Procedure type:    _____ Ultrasound guided __x___ Stereotactic    Breast:    _____ Left Breast Biopsy  __x___ Right Breast Biopsy    Time-out called @ 10:20am and procedure confirmed with patient Sheryle Lashell, myself Kristen Wall RN, Destinee Khalil MD, and Sarah Echevarria RT(R)(M)(DS)     Location: Right Breast 9 o'clock calcifications    Needle: 8G mammotome    # of specimen chambers filled: 3    Calcifications present in chamber #: 1, 2, and 3 all with calcifications present    Clip: U mammotome marker    Performed by: Destinee Khalil MD    Pressure held for 5 minutes by: Kristen Wall RN    Steri Strips:    __x___ yes _____ no    Band aid:    _____ yes __x___ no  *Gauze and tape in place      Tolerated procedure:    __x___ yes _____ no

## 2023-03-13 NOTE — PROGRESS NOTES
Patient arrived via:    __x___ ambulatory    _____ wheelchair    _____ stretcher      Domestic violence screen    ___x___ negative ______ positive      Breast Implants:    _____ yes ___x___ no

## 2023-03-13 NOTE — PROGRESS NOTES
Ice pack given:    __x___ yes _____ no  *Ice pack provided to patient prior to discharge, and patient educated on importance of use to reduce the risk for post-procedural bruising, bleeding, or hematoma development      Discharge instructions given to patient:    __x___ yes _____ no      Discharged via:    __x___ ambulatory    _____ wheelchair    _____ stretcher      Stable on discharge:    __x___ yes _____ no

## 2023-03-13 NOTE — DISCHARGE INSTR - OTHER ORDERS
POST LARGE CORE BREAST BIOPSY PATIENT INFORMATION      Place an ice pack inside your bra over the top of the dressing every hour for 20 minutes (20 minutes on, 60 minutes off)  Do this until bedtime  Do not shower or bathe until the following morning  You may bathe your breast carefully with the steri-strips in place  Be careful    Not to loosen them  The steri-strips will fall off in 3-5 days  You may have mild discomfort, and you may have some bruising where the   Needle entered the skin  This should clear within 5-7 days  If you need medicine for discomfort, take acetaminophen products such as   Tylenol  You may also take Advil or Motrin products  Do not participate in strenuous activities such as-tennis, aerobics, skiing,  Weight lifting, etc  for 24 hours  Refrain from swimming/soaking for 72 hours  Wearing a bra for sleeping may be more comfortable for the first 24-48 hours  Watch for continued bleeding, pain or fever over 101  If any of these symptoms occur, please contact our    breast nurse navigator at the location where your biopsy was performed  During normal business hours (7:30 am-4:00 pm) please call the nurse navigator at the site where your   procedure was performed:    [facility] [address]:  [phone] or   [address]:     [phone] or [address] [facility]:    [phone] or [phone]  [address] [facility]:   [phone]  [facility]:   [address] [facility]:     [phone]              After 4 PM - please call your physician or go to the nearest Emergency Department location  9          The final results of your biopsy are usually available within one week

## 2023-03-14 ENCOUNTER — TELEPHONE (OUTPATIENT)
Dept: RADIOLOGY | Facility: HOSPITAL | Age: 73
End: 2023-03-14

## 2023-03-14 NOTE — PROGRESS NOTES
Post-procedure call completed: Tuesday 03/14/2023    Bleeding: _____ yes __x__ no    Pain: __x___yes ______ no    *Patient reports some Right breast pain & soreness post-procedure  She reports using the ice pack Monday evening 03/13/2023 to help with pain and risk reduction of post-procedural bruising/bleeding/hematoma  She has taken some doses of tylenol for additional management of breast pain/ soreness  Denton Hough is aware she may continue to use OTC pain control medications as needed (tylenol would be most appropriate as she is on long term anti-coagulation therapy; avoid aspirin based products due to bleeding risk)  Redness/Swelling: ______ yes ___x___ no    Band aid removed: ___x__ yes _____no    Jian Carpenter reports her niece Ekta Gaspar helped her this morning to remove the gauze and tape above the biopsy incision, and there is no concern near the area at this time  She is aware she may shower later this afternoon & allow warm water and soap to rinse over the breast in the shower  Avoid scrubbing near the site, and gently pat dry once finished showering  She is aware to avoid soaking to the breast, swimming, or tub baths until biopsy incision is fully healed to reduce any risk for infection  Steri-Strips intact: ___x___ yes _____ no    *Patient instructed to leave steri-strips in place until at least Friday 03/17/2023  If steri-strips do not fall off on their own at that time, it would be appropriate to remove  Patient with no additional questions at this time  I relayed to her that either myself or Dr Aileen Esocbar will call when her breast biopsy pathology results are available  Patient does have my name & office phone number if she has any questions or concerns in the interim of time until her pathology results are finalized

## 2023-03-15 ENCOUNTER — TELEPHONE (OUTPATIENT)
Dept: RADIOLOGY | Facility: HOSPITAL | Age: 73
End: 2023-03-15

## 2023-03-15 DIAGNOSIS — R89.7 ABNORMAL BREAST BIOPSY: Primary | ICD-10-CM

## 2023-03-16 ENCOUNTER — TELEPHONE (OUTPATIENT)
Dept: HEMATOLOGY ONCOLOGY | Facility: CLINIC | Age: 73
End: 2023-03-16

## 2023-03-16 NOTE — TELEPHONE ENCOUNTER
Appointment Cancellation or Reschedule     Person calling in Patient   If someone other than patient calling, are they listed on the communication consent form? N/A   Provider Jamila Gardner, [address]   Office Visit Date and Time  3/23/23 9:30 AM   Office Visit Location [facility]   Did patient want to reschedule their visit? If so, when was it scheduled to? 4/27/23 9:00AM with Dr Shayy Xie   Did the patient have STAR scheduled for this appointment? No   Does the patient need STAR scheduled for their new appointment? No   Is this patient calling to reschedule an infusion appointment? No   When is their next infusion appointment? N/A   Is this patient a Chemo patient? No   Reason for Cancellation or Reschedule Patient needed to be scheduled with a   I offered patient 3/27/23 in [place] with Dr Kayley Martinez at 2:15Pm & 3:00PM and the patient declined  I also offered the patient 4/20/23 at 2:30PM with Dr Janace Halsted in [facility] and the patient declined as well  Patient wanted to stay in [facility]  Was the No show policy reviewed with patient if patient is canceling within 24 hours? Yes     If the patient is cancelling an appointment and needs their STAR Transport cancelled, please route to Vania [address]  If the patient is a treatment patient, please route this to the office nurse  If the patient is not on treatment, please route to the Clerical pool based on location  If the patient is a surgical oncology patient, please route to surg/onc clinical pool  Route message as high priority if same day cancellation

## 2023-03-16 NOTE — TELEPHONE ENCOUNTER
Left voicemail for patient regarding her visit with Chinmay Jon scheduled for 3/23/23 at 9:30AM  Advised patient that this appointment would need to be rescheduled with a doctor  Instructed patient to call [facility] phone number to reschedule visit

## 2023-03-20 ENCOUNTER — TELEPHONE (OUTPATIENT)
Dept: INTERNAL MEDICINE CLINIC | Facility: CLINIC | Age: 73
End: 2023-03-20

## 2023-03-21 DIAGNOSIS — R05.1 ACUTE COUGH: Primary | ICD-10-CM

## 2023-03-21 DIAGNOSIS — J96.11 CHRONIC RESPIRATORY FAILURE WITH HYPOXIA AND HYPERCAPNIA (HCC): ICD-10-CM

## 2023-03-21 DIAGNOSIS — J96.12 CHRONIC RESPIRATORY FAILURE WITH HYPOXIA AND HYPERCAPNIA (HCC): ICD-10-CM

## 2023-03-21 DIAGNOSIS — R91.1 LUNG NODULE: ICD-10-CM

## 2023-03-21 RX ORDER — AZITHROMYCIN 250 MG/1
TABLET, FILM COATED ORAL
Qty: 6 TABLET | Refills: 0 | Status: SHIPPED | OUTPATIENT
Start: 2023-03-21 | End: 2023-03-28

## 2023-03-21 NOTE — PROGRESS NOTES
Complains of 3 days history of cough, green phlegm, mild wheezing, denies any fever chills pulse oxing 93 to 94% heart rate is in the range of 70 denies any significant stuffy nose runny nose  Patient is a known case of chronic respiratory failure lung nodule other multiple comorbidities  We advised her to go to the emergency room patient is refusing at this time  She wants antibiotic to be called and  Patient was to the emergency room on 321 patient was given Zithromax she thinks it worked very well but she is getting recurrent symptoms  Will call in Zithromax  We will see her back in 48 hours due to time constraints today  She does not want to go to urgent care either  If she is any worse will go to the emergency room  Is currently using albuterol 2-3 times a day recommend to use albuterol every 6 hours for at least 4 times a day

## 2023-03-23 ENCOUNTER — OFFICE VISIT (OUTPATIENT)
Dept: INTERNAL MEDICINE CLINIC | Facility: CLINIC | Age: 73
End: 2023-03-23

## 2023-03-23 ENCOUNTER — APPOINTMENT (OUTPATIENT)
Dept: LAB | Facility: HOSPITAL | Age: 73
End: 2023-03-23

## 2023-03-23 ENCOUNTER — HOSPITAL ENCOUNTER (OUTPATIENT)
Dept: RADIOLOGY | Facility: HOSPITAL | Age: 73
Discharge: HOME | End: 2023-03-23

## 2023-03-23 VITALS
SYSTOLIC BLOOD PRESSURE: 122 MMHG | HEART RATE: 59 BPM | WEIGHT: 258 LBS | BODY MASS INDEX: 47.48 KG/M2 | DIASTOLIC BLOOD PRESSURE: 70 MMHG | OXYGEN SATURATION: 100 % | HEIGHT: 62 IN

## 2023-03-23 DIAGNOSIS — I48.0 PAROXYSMAL ATRIAL FIBRILLATION (HCC): ICD-10-CM

## 2023-03-23 DIAGNOSIS — J44.9 CHRONIC OBSTRUCTIVE PULMONARY DISEASE, UNSPECIFIED COPD TYPE (HCC): ICD-10-CM

## 2023-03-23 DIAGNOSIS — E11.65 TYPE 2 DIABETES MELLITUS WITH HYPERGLYCEMIA, WITHOUT LONG-TERM CURRENT USE OF INSULIN (HCC): ICD-10-CM

## 2023-03-23 DIAGNOSIS — J45.41 MODERATE PERSISTENT ASTHMA WITH ACUTE EXACERBATION: Primary | ICD-10-CM

## 2023-03-23 DIAGNOSIS — I10 ESSENTIAL HYPERTENSION: ICD-10-CM

## 2023-03-23 DIAGNOSIS — Z79.4 TYPE 2 DIABETES MELLITUS WITH HYPERGLYCEMIA, WITH LONG-TERM CURRENT USE OF INSULIN (HCC): ICD-10-CM

## 2023-03-23 DIAGNOSIS — J45.41 MODERATE PERSISTENT ASTHMA WITH ACUTE EXACERBATION: ICD-10-CM

## 2023-03-23 DIAGNOSIS — E11.65 TYPE 2 DIABETES MELLITUS WITH HYPERGLYCEMIA, WITH LONG-TERM CURRENT USE OF INSULIN (HCC): ICD-10-CM

## 2023-03-23 DIAGNOSIS — I73.00 RAYNAUD'S DISEASE WITHOUT GANGRENE: ICD-10-CM

## 2023-03-23 LAB
BASOPHILS # BLD AUTO: 0.08 THOUSANDS/ÂΜL (ref 0–0.1)
BASOPHILS NFR BLD AUTO: 1 % (ref 0–1)
CREAT UR-MCNC: 61.7 MG/DL
EOSINOPHIL # BLD AUTO: 0.56 THOUSAND/ÂΜL (ref 0–0.61)
EOSINOPHIL NFR BLD AUTO: 7 % (ref 0–6)
ERYTHROCYTE [DISTWIDTH] IN BLOOD BY AUTOMATED COUNT: 14.6 % (ref 11.6–15.1)
HCT VFR BLD AUTO: 37.3 % (ref 34.8–46.1)
HGB BLD-MCNC: 11.7 G/DL (ref 11.5–15.4)
IMM GRANULOCYTES # BLD AUTO: 0.02 THOUSAND/UL (ref 0–0.2)
IMM GRANULOCYTES NFR BLD AUTO: 0 % (ref 0–2)
LYMPHOCYTES # BLD AUTO: 1.89 THOUSANDS/ÂΜL (ref 0.6–4.47)
LYMPHOCYTES NFR BLD AUTO: 23 % (ref 14–44)
MCH RBC QN AUTO: 30 PG (ref 26.8–34.3)
MCHC RBC AUTO-ENTMCNC: 31.4 G/DL (ref 31.4–37.4)
MCV RBC AUTO: 96 FL (ref 82–98)
MICROALBUMIN UR-MCNC: 34 MG/L (ref 0–20)
MICROALBUMIN/CREAT 24H UR: 55 MG/G CREATININE (ref 0–30)
MONOCYTES # BLD AUTO: 0.8 THOUSAND/ÂΜL (ref 0.17–1.22)
MONOCYTES NFR BLD AUTO: 10 % (ref 4–12)
NEUTROPHILS # BLD AUTO: 5.01 THOUSANDS/ÂΜL (ref 1.85–7.62)
NEUTS SEG NFR BLD AUTO: 59 % (ref 43–75)
NRBC BLD AUTO-RTO: 0 /100 WBCS
PLATELET # BLD AUTO: 171 THOUSANDS/UL (ref 149–390)
PMV BLD AUTO: 11.4 FL (ref 8.9–12.7)
RBC # BLD AUTO: 3.9 MILLION/UL (ref 3.81–5.12)
WBC # BLD AUTO: 8.36 THOUSAND/UL (ref 4.31–10.16)

## 2023-03-23 RX ORDER — FLUTICASONE PROPIONATE 110 UG/1
2 AEROSOL, METERED RESPIRATORY (INHALATION) 2 TIMES DAILY
Qty: 12 G | Refills: 0 | Status: SHIPPED | OUTPATIENT
Start: 2023-03-23 | End: 2023-03-28

## 2023-03-23 NOTE — ASSESSMENT & PLAN NOTE
Patient with multiple abilities including diabetes, GERD, obstructive sleep apnea, COPD, history of pneumonia,Paroxysmal atrial fibrillation, hypertension, congestive heart failure    Presents with a 5 days history of cough chest congestion mild shortness of breath and wheezing  I believe she is starting to feel better but it will take few days to couple of weeks for her to get better  This is second course of antibiotics he is getting in last 3 weeks  Patient was seen by pulmonologist was to the emergency room  We will proceed with lab data test and chest x-ray including CBC with differential CMP and chest x-ray to make sure that there is no pneumonia I do not clinically believe she has pneumonia  Her pulse ox is 100%  And home pulse ox has been 93 to 94%  She is on oxygen at nighttime  She is going for obstructive sleep apnea study in the near future  I will see her back in 5 days if she is not better she is to go to the emergency room    Recommend to consider follow-up with the pulmonologist if your cough and chest congestion persist

## 2023-03-23 NOTE — PATIENT INSTRUCTIONS
Finish your antibiotic Zithromax total 5 days course  We will add Flovent 110 mcg 2 puff twice a day  Increase your albuterol 2 puff 4 times a day  Do gargles after using Flovent  Go for the blood test we will check CBC CMP and hemoglobin A1c today as well as the chest x-ray  You may take Mucinex DM 1 tablet twice a day for the cough and chest congestion for symptomatic relief  If your oxygen level is less than 92% go to the emergency room  Check your temperature blood pressure pulse pulse ox twice a day  Keep your appointment on March 28 5 days from today

## 2023-03-23 NOTE — ASSESSMENT & PLAN NOTE
Wt Readings from Last 3 Encounters:   03/23/23 117 kg (258 lb)   03/09/23 118 kg (261 lb)   03/08/23 118 kg (261 lb)       Clinically compensated CHF    We will continue same regimen

## 2023-03-23 NOTE — PROGRESS NOTES
Name: Andrew Ricketts      : 1950      MRN: [id number]  Encounter Provider: Ghada Goldman MD  Encounter Date: 3/23/2023   Encounter department: [address]     1  Moderate persistent asthma with acute exacerbation  Assessment & Plan:  Patient with multiple abilities including diabetes, GERD, obstructive sleep apnea, COPD, history of pneumonia,Paroxysmal atrial fibrillation, hypertension, congestive heart failure    Presents with a 5 days history of cough chest congestion mild shortness of breath and wheezing  I believe she is starting to feel better but it will take few days to couple of weeks for her to get better  This is second course of antibiotics he is getting in last 3 weeks  Patient was seen by pulmonologist was to the emergency room  We will proceed with lab data test and chest x-ray including CBC with differential CMP and chest x-ray to make sure that there is no pneumonia I do not clinically believe she has pneumonia  Her pulse ox is 100%  And home pulse ox has been 93 to 94%  She is on oxygen at nighttime  She is going for obstructive sleep apnea study in the near future  I will see her back in 5 days if she is not better she is to go to the emergency room  Recommend to consider follow-up with the pulmonologist if your cough and chest congestion persist     Orders:  -     XR chest pa & lateral; Future; Expected date: 2023  -     Comprehensive metabolic panel; Future; Expected date: 2023  -     Hemoglobin A1C; Future; Expected date: 2023  -     CBC and differential; Future  -     fluticasone (Flovent HFA) 110 MCG/ACT inhaler; Inhale 2 puffs 2 (two) times a day Rinse mouth after use  2  Chronic obstructive pulmonary disease, unspecified COPD type ([facility] [address] )  -     XR chest pa & lateral; Future; Expected date: 2023  -     Comprehensive metabolic panel;  Future; Expected date: 2023  -     CBC and differential; Future    3  Type 2 diabetes mellitus with hyperglycemia, without long-term current use of insulin (HCC)  -     Hemoglobin A1C; Future; Expected date: 06/23/2023    4  Essential hypertension  Assessment & Plan:  Hypertension well controlled  Orders:  -     XR chest pa & lateral; Future; Expected date: 03/23/2023  -     Comprehensive metabolic panel; Future; Expected date: 03/23/2023  -     CBC and differential; Future    5  Paroxysmal atrial fibrillation (HCC)  Assessment & Plan:  Heart rate reasonable  Continue same regimen    Orders:  -     XR chest pa & lateral; Future; Expected date: 03/23/2023  -     Comprehensive metabolic panel; Future; Expected date: 03/23/2023  -     CBC and differential; Future    6  Raynaud's disease without gangrene      BMI Counseling: Body mass index is 47 19 kg/m²  The BMI is above normal  Nutrition recommendations include decreasing portion sizes, encouraging healthy choices of fruits and vegetables, decreasing fast food intake, limiting drinks that contain sugar, moderation in carbohydrate intake, increasing intake of lean protein, reducing intake of saturated and trans fat and reducing intake of cholesterol  Exercise recommendations include exercising 3-5 times per week and strength training exercises  No pharmacotherapy was ordered  Rationale for BMI follow-up plan is due to patient being overweight or obese  Subjective     Patient did call day before yesterday with 3 days history of cough chest congestion green phlegm and mild wheezy feeling  She did not have any fever chills smell or taste problems did not have any significant stuffy nose runny nose  She started using inhaler twice a day  I will be calling Zithromax as well as we advised her to increase inhaler 4 times a day here she is here for follow-up  Feels better          First day of symptom: 5 days ago    First day of contact to our office: 3-    Exposure History: No covid exposure  Home COVID test was normal  3-  Travel Hx: No travel    HPI:  Feeling chelsea    Today:  Cough: cough is not that bad, do get couhging spell, not coughing as much  Shortness of Breath: starting feel better, get sob easily  Chest tightness: no, little tightness from muscle soreness from couhg  Phlegm: little , color still dark green  Nasal congestion:no  Sore throat:no  Wheezing still there but better since using inhaler  Smell Problem:no  Taste Problem:no    Abdominal Pain/Nausea/Vomiting/Diaarhea:no    Fever/Chills:no  Fatigue: yes,   Headache:no  Bodyache:no    Treatment tried: zithromax, day 3 from today, using inhaler three times a day        Immunization Hx for COVID vaccine:                  Review of Systems   Constitutional: Negative for chills and fever  HENT: Positive for congestion  Negative for ear pain, postnasal drip, rhinorrhea and sore throat  Eyes: Negative for pain and visual disturbance  Respiratory: Positive for cough, shortness of breath and wheezing  Cardiovascular: Negative for chest pain and palpitations  Gastrointestinal: Negative for abdominal pain, constipation, diarrhea and vomiting  Endocrine: Negative for polyuria  Genitourinary: Negative for dysuria and hematuria  Musculoskeletal: Negative for arthralgias and back pain  Skin: Negative for color change and rash  Neurological: Negative for seizures and syncope  Psychiatric/Behavioral: Negative for agitation and hallucinations  All other systems reviewed and are negative        Past Medical History:   Diagnosis Date   • SEBASTIAN (acute kidney injury) ([place] [address] ) 1/23/2023   • Anemia    • Asthma    • Chronic kidney disease    • COPD (chronic obstructive pulmonary disease) ([place] [address] )    • COVID-19 January 2022   • Diabetes mellitus ([place] [address] )    • GERD (gastroesophageal reflux disease)    • Hyperlipidemia    • Hypertension    • PONV (postoperative nausea and vomiting)    • SVT (supraventricular tachycardia) ([place] [address] ) Past Surgical History:   Procedure Laterality Date   • APPENDECTOMY     • BREAST BIOPSY Right     years ago when she was 23   • BREAST BIOPSY Right 2023   • CYSTOSCOPY W/ LASER LITHOTRIPSY Left 2016    Procedure: CYSTOSCOPY URETEROSCOPY WITH LITHOTRIPSY HOLMIUM LASER, RETROGRADE PYELOGRAM AND INSERTION STENT URETERAL;  Surgeon: Mai Simmons MD;  Location: [address] [place] [address];  Service:    • DILATION AND CURETTAGE OF UTERUS     • IR THORACENTESIS  2022   • JOINT REPLACEMENT      right knee   • KNEE ARTHROPLASTY Right    • MAMMO STEREOTACTIC BREAST BIOPSY RIGHT (ALL INC) Right 3/13/2023   • SC ARTHROPLASTY GLENOHUMERAL JOINT TOTAL SHOULDER Left 2022    Procedure: ARTHROPLASTY SHOULDER REVERSE;  Surgeon: Nathalia Mackey MD;  Location: [facility];  Service: Orthopedics   • SC CYSTO BLADDER W/URETERAL CATHETERIZATION Left 2016    Procedure: CYSTOSCOPY RETROGRADE PYELOGRAM WITH INSERTION STENT URETERAL, left;  Surgeon: Mai Simmons MD;  Location: [facility];  Service: Urology   • SHOULDER ARTHROTOMY Left      Family History   Problem Relation Age of Onset   • Heart disease Mother    • Diabetes Father    • Thyroid cancer Sister    • Emphysema Maternal Grandmother    • Heart disease Family      Social History     Socioeconomic History   • Marital status: Single     Spouse name: None   • Number of children: 0   • Years of education: 15   • Highest education level: Associate degree: academic program   Occupational History   • None   Tobacco Use   • Smoking status: Former     Packs/day: 1 00     Years: 20 00     Pack years: 20 00     Types: Cigarettes     Quit date: 1996     Years since quittin 7   • Smokeless tobacco: Never   Vaping Use   • Vaping Use: Never used   Substance and Sexual Activity   • Alcohol use: Not Currently     Comment: rarely   • Drug use: Never   • Sexual activity: Not Currently   Other Topics Concern   • None   Social History Narrative    ** Merged History Encounter **          Social Determinants of Health     Financial Resource Strain: Not on file   Food Insecurity: No Food Insecurity   • Worried About [address] in the Last Year: Never true   • Ran Out of Food in the Last Year: Never true   Transportation Needs: No Transportation Needs   • Lack of Transportation (Medical): No   • Lack of Transportation (Non-Medical): No   Physical Activity: Not on file   Stress: Not on file   Social Connections: Not on file   Intimate Partner Violence: Not on file   Housing Stability: Low Risk    • Unable to Pay for Housing in the Last Year: No   • Number of Places Lived in the Last Year: 2   • Unstable Housing in the Last Year: No     Current Outpatient Medications on File Prior to Visit   Medication Sig   • albuterol (PROVENTIL HFA,VENTOLIN HFA) 90 mcg/act inhaler Inhale 2 puffs every 6 (six) hours as needed for wheezing   • ammonium lactate (LAC-HYDRIN) 12 % lotion APPLY TOPICALLY TO AFFECTED AREAS twice a day   • apixaban (ELIQUIS) 5 mg Take 5 mg by mouth 2 (two) times a day   • azithromycin (Zithromax) 250 mg tablet Take 2 tablets (500 mg total) by mouth daily for 1 day, THEN 1 tablet (250 mg total) daily for 4 days     • B Complex-C-Folic Acid (triphrocaps) 1 MG CAPS take 1 capsule by mouth once daily   • Continuous Blood Gluc  (FreeStyle Jett 2 [place]) NORM Use 1 Units continuous   • Continuous Blood Gluc Sensor (FreeStyle Dawn 2 Sensor) MISC Use 1 Units every 14 (fourteen) days   • docusate sodium (COLACE) 100 mg capsule Take 100 mg by mouth in the morning   • fluticasone (FLONASE) 50 mcg/act nasal spray 1 spray into each nostril daily   • glucose blood test strip Use 1 each 2 (two) times a day Use one 2 times daily   • insulin glargine (Toujeo SoloStar) 300 units/mL CONCENTRATED U-300 injection pen (1-unit dial) Inject 25 Units under the skin every 12 (twelve) hours   • insulin lispro (HumaLOG KwikPen) 100 units/mL injection pen Use 10 units prior to each meal +1 unit per 50 above 150 mg/dL   • Insulin Pen Needle (BD Pen Needle Pilar 2nd Gen) 32G X 4 MM MISC For use with insulin pen  Pharmacy may dispense brand covered by insurance  • Insulin Pen Needle (NovoFine Autocover) 30G X 8 MM MISC Inject under the skin daily   • Insulin Pen Needle 30G X 8 MM MISC 2 (two) times a day   • Insulin Pen Needle 31G X 8 MM MISC Use daily Inject under the skin   • Lancets (onetouch ultrasoft) lancets Use once daily   • metoprolol succinate (TOPROL-XL) 50 mg 24 hr tablet Take 1 tablet (50 mg total) by mouth daily   • montelukast (SINGULAIR) 10 mg tablet Take 10 mg by mouth daily at bedtime   • NovoFine Autocover Pen Needle 30G X 8 MM MISC USE TWICE A DAY   • pregabalin (LYRICA) 100 mg capsule Take 100 mg by mouth 2 (two) times a day   • rosuvastatin (CRESTOR) 10 MG tablet take 1 tablet by mouth once daily   • semaglutide, 0 25 or 0 5 mg/dose, (Ozempic, 0 25 or 0 5 MG/DOSE,) 2 mg/1 5 mL injection pen Use 0 25mg weekly for 4 weeks and then 0 5mg weekly   • torsemide (DEMADEX) 20 mg tablet Take 2 tablets in the AM     Allergies   Allergen Reactions   • Penicillins Hives   • Moxifloxacin Other (See Comments)     unknown   • Oxycodone-Acetaminophen Other (See Comments)     GI upset   • Zinc Acetate Other (See Comments)     unknown   • Penicillins Hives   • Asa [Aspirin] GI Intolerance   • Indocin [Indomethacin] Other (See Comments)     Made patient "loopy"   • Other Other (See Comments)     unknown   • Tannic Acid Rash     Immunization History   Administered Date(s) Administered   • COVID-19 MODERNA VACC 0 5 ML IM 02/11/2022   • COVID-19 Moderna vac 6-11y or adult booster 50 mcg/0 5 mL 03/11/2022   • Tdap 01/22/2023   • Tuberculin Skin Test 02/20/2022   • Unknown 02/11/2022       Objective     /70   Pulse 59   Ht 5' 2" (1 575 m)   Wt 117 kg (258 lb)   SpO2 100%   BMI 47 19 kg/m²     Physical Exam  Vitals and nursing note reviewed     Constitutional:       General: She is not in acute distress  Appearance: She is well-developed  She is obese  She is not ill-appearing, toxic-appearing or diaphoretic  HENT:      Head: Normocephalic and atraumatic  Right Ear: External ear normal       Left Ear: External ear normal    Eyes:      General: No scleral icterus  Right eye: No discharge  Left eye: No discharge  Conjunctiva/sclera: Conjunctivae normal    Neck:      Thyroid: No thyromegaly  Vascular: No carotid bruit or JVD  Cardiovascular:      Rate and Rhythm: Regular rhythm  Heart sounds: Normal heart sounds  Pulmonary:      Effort: No respiratory distress  Breath sounds: Decreased breath sounds present  No wheezing, rhonchi or rales  Comments: Mild moist chest congestion when asked to cough    NAD,   Musculoskeletal:      Cervical back: No tenderness  Right lower leg: No edema  Left lower leg: No edema  Lymphadenopathy:      Cervical: No cervical adenopathy  Skin:     General: Skin is warm  Coloration: Skin is not jaundiced or pale  Findings: No rash  Neurological:      Mental Status: She is oriented to person, place, and time  Mental status is at baseline        Peak  Flow 200   Ree Blackwell MD

## 2023-03-24 ENCOUNTER — TELEPHONE (OUTPATIENT)
Dept: ENDOCRINOLOGY | Facility: CLINIC | Age: 73
End: 2023-03-24

## 2023-03-24 ENCOUNTER — OFFICE VISIT (OUTPATIENT)
Dept: ENDOCRINOLOGY | Facility: CLINIC | Age: 73
End: 2023-03-24

## 2023-03-24 VITALS
HEIGHT: 62 IN | HEART RATE: 63 BPM | DIASTOLIC BLOOD PRESSURE: 62 MMHG | WEIGHT: 258 LBS | OXYGEN SATURATION: 96 % | TEMPERATURE: 97.1 F | SYSTOLIC BLOOD PRESSURE: 122 MMHG | BODY MASS INDEX: 47.48 KG/M2

## 2023-03-24 DIAGNOSIS — Z79.4 TYPE 2 DIABETES MELLITUS WITH HYPEROSMOLARITY WITHOUT COMA, WITH LONG-TERM CURRENT USE OF INSULIN (HCC): ICD-10-CM

## 2023-03-24 DIAGNOSIS — G47.33 OSA (OBSTRUCTIVE SLEEP APNEA): ICD-10-CM

## 2023-03-24 DIAGNOSIS — E66.01 MORBID OBESITY WITH BMI OF 45.0-49.9, ADULT (HCC): Chronic | ICD-10-CM

## 2023-03-24 DIAGNOSIS — E11.00 TYPE 2 DIABETES MELLITUS WITH HYPEROSMOLARITY WITHOUT COMA, WITH LONG-TERM CURRENT USE OF INSULIN (HCC): ICD-10-CM

## 2023-03-24 DIAGNOSIS — E11.65 TYPE 2 DIABETES MELLITUS WITH HYPERGLYCEMIA, WITHOUT LONG-TERM CURRENT USE OF INSULIN (HCC): Primary | ICD-10-CM

## 2023-03-24 PROBLEM — J18.9 PNEUMONIA: Status: RESOLVED | Noted: 2023-01-22 | Resolved: 2023-03-24

## 2023-03-24 LAB
EST. AVERAGE GLUCOSE BLD GHB EST-MCNC: 189 MG/DL
HBA1C MFR BLD: 8.2 %

## 2023-03-24 NOTE — PROGRESS NOTES
Follow-up Patient Progress Note      CC: Type 2 Diabetes     History of Present Illness:   68-year-old female with type 2 diabetes, HTN, HLD, HFpEF, A-fib, CKD 3 EGFR 32, KATRINA, COPD, ambulatory dysfunction, morbid obesity BMI 46 and vitamin D deficiency  Last visit was 2/9/23  Since last visit, weight is stable but glucose is better      Home blood glucose monitoring: Checks 3-4 times a day  Fasting up to 120 mg/dL and postprandial in 110 to 220 mg/dL range      Current meds:   Toujeo 25 units every 12 hours  Humalog 10u tidac plus 1u/50 above 150mg/dL     Opthamology: No  Podiatry:   vaccination: Yes  Dental:  Pancreatitis: No     Ace/ARB: No  Statin: Crestor  Thyroid issues: No    Patient Active Problem List   Diagnosis   • Moderate persistent asthma with acute exacerbation   • Morbid obesity with BMI of 45 0-49 9, adult ([facility])   • Lower leg edema   • Paroxysmal atrial fibrillation ([facility])   • Mixed hyperlipidemia   • Anemia   • Essential hypertension   • PONV (postoperative nausea and vomiting)   • Gastroesophageal reflux disease   • Nephrolithiasis   • Arthritis   • S/P total knee replacement, right   • On continuous oral anticoagulation - eliquis   • Status post reverse total replacement of left shoulder   • Stage 3 chronic kidney disease ([facility])   • Peripheral venous insufficiency   • Post-herpetic polyneuropathy   • Raynaud's disease   • Lung nodule   • Chronic diastolic heart failure ([facility])   • Pulmonary nodules   • Lump of left breast   • Pressure injury of deep tissue of sacral region   • Obesity hypoventilation syndrome ([facility])   • KATRINA (obstructive sleep apnea)   • Elevated serum creatinine   • Chronic respiratory failure with hypoxia and hypercapnia ([facility])   • Platelets decreased ([facility])   • PSVT (paroxysmal supraventricular tachycardia) ([facility])   • Rash   • Impaired mobility and ADLs   • Pressure injury of sacral region, stage 2 ([facility])   • Blister of right heel   • Heel blister   • Chronic obstructive pulmonary disease, unspecified COPD type ([place] [address] )   • Fall   • Pneumonia   • Syncope   • Type 2 diabetes mellitus with hyperglycemia, without long-term current use of insulin ([facility])   • CHF (congestive heart failure) ([facility])   • Atrial fibrillation ([facility])   • CKD (chronic kidney disease)   • Hypertension     Past Medical History:   Diagnosis Date   • SEBASTIAN (acute kidney injury) ([place] [address] ) 1/23/2023   • Anemia    • Asthma    • Chronic kidney disease    • COPD (chronic obstructive pulmonary disease) ([place] [address] )    • COVID-19     January 2022   • Diabetes mellitus ([place] [address] )    • GERD (gastroesophageal reflux disease)    • Hyperlipidemia    • Hypertension    • PONV (postoperative nausea and vomiting)    • SVT (supraventricular tachycardia) ([facility])       Past Surgical History:   Procedure Laterality Date   • APPENDECTOMY     • BREAST BIOPSY Right     years ago when she was 23   • BREAST BIOPSY Right 03/13/2023   • CYSTOSCOPY W/ LASER LITHOTRIPSY Left 07/12/2016    Procedure: CYSTOSCOPY URETEROSCOPY WITH LITHOTRIPSY HOLMIUM LASER, RETROGRADE PYELOGRAM AND INSERTION STENT URETERAL;  Surgeon: Lorri English MD;  Location: [address] [place] [address];  Service:    • DILATION AND CURETTAGE OF UTERUS     • IR THORACENTESIS  03/18/2022   • JOINT REPLACEMENT      right knee   • KNEE ARTHROPLASTY Right    • MAMMO STEREOTACTIC BREAST BIOPSY RIGHT (ALL INC) Right 3/13/2023   • NV ARTHROPLASTY GLENOHUMERAL JOINT TOTAL SHOULDER Left 03/01/2022    Procedure: ARTHROPLASTY SHOULDER REVERSE;  Surgeon: Rosaura Ureña MD;  Location: [facility];  Service: Orthopedics   • NV CYSTO BLADDER W/URETERAL CATHETERIZATION Left 06/29/2016    Procedure: CYSTOSCOPY RETROGRADE PYELOGRAM WITH INSERTION STENT URETERAL, left;  Surgeon: Lorri English MD;  Location: [facility];  Service: Urology   • SHOULDER ARTHROTOMY Left       Family History   Problem Relation Age of Onset   • Heart disease Mother    • Diabetes Father    • Thyroid cancer Sister    • Emphysema Maternal Grandmother    • Heart disease Family      Social History     Tobacco Use   • Smoking status: Former     Packs/day: 1 00     Years: 20 00     Pack years: 20 00     Types: Cigarettes     Quit date: 1996     Years since quittin 7   • Smokeless tobacco: Never   Substance Use Topics   • Alcohol use: Not Currently     Comment: rarely     Allergies   Allergen Reactions   • Penicillins Hives   • Moxifloxacin Other (See Comments)     unknown   • Oxycodone-Acetaminophen Other (See Comments)     GI upset   • Zinc Acetate Other (See Comments)     unknown   • Penicillins Hives   • Asa [Aspirin] GI Intolerance   • Indocin [Indomethacin] Other (See Comments)     Made patient "loopy"   • Other Other (See Comments)     unknown   • Tannic Acid Rash         Current Outpatient Medications:   •  albuterol (PROVENTIL HFA,VENTOLIN HFA) 90 mcg/act inhaler, Inhale 2 puffs every 6 (six) hours as needed for wheezing, Disp: 8 g, Rfl: 4  •  ammonium lactate (LAC-HYDRIN) 12 % lotion, APPLY TOPICALLY TO AFFECTED AREAS twice a day, Disp: , Rfl:   •  apixaban (ELIQUIS) 5 mg, Take 5 mg by mouth 2 (two) times a day, Disp: , Rfl:   •  azithromycin (Zithromax) 250 mg tablet, Take 2 tablets (500 mg total) by mouth daily for 1 day, THEN 1 tablet (250 mg total) daily for 4 days  , Disp: 6 tablet, Rfl: 0  •  B Complex-C-Folic Acid (triphrocaps) 1 MG CAPS, take 1 capsule by mouth once daily, Disp: 30 capsule, Rfl: 5  •  Continuous Blood Gluc  (FreeStyle Dawn 2 [place]) NORM, Use 1 Units continuous, Disp: 1 each, Rfl: 0  •  Continuous Blood Gluc Sensor (FreeStyle Dawn 2 Sensor) MISC, Use 1 Units every 14 (fourteen) days, Disp: 2 each, Rfl: 5  •  docusate sodium (COLACE) 100 mg capsule, Take 100 mg by mouth in the morning, Disp: , Rfl:   •  fluticasone (FLONASE) 50 mcg/act nasal spray, 1 spray into each nostril daily, Disp: , Rfl: 0  •  fluticasone (Flovent HFA) 110 MCG/ACT inhaler, Inhale 2 puffs 2 (two) times a day Rinse mouth after use , Disp: 12 g, Rfl: 0  •  glucose blood test strip, Use 1 each 2 (two) times a day Use one 2 times daily, Disp: 200 strip, Rfl: 5  •  insulin glargine (Toujeo SoloStar) 300 units/mL CONCENTRATED U-300 injection pen (1-unit dial), Inject 25 Units under the skin every 12 (twelve) hours, Disp: , Rfl:   •  insulin lispro (HumaLOG KwikPen) 100 units/mL injection pen, Use 10 units prior to each meal +1 unit per 50 above 150 mg/dL, Disp: 15 mL, Rfl: 2  •  Insulin Pen Needle (BD Pen Needle Pilar 2nd Gen) 32G X 4 MM MISC, For use with insulin pen  Pharmacy may dispense brand covered by insurance , Disp: 100 each, Rfl: 0  •  Insulin Pen Needle (NovoFine Autocover) 30G X 8 MM MISC, Inject under the skin daily, Disp: 100 each, Rfl: 3  •  Insulin Pen Needle 30G X 8 MM MISC, 2 (two) times a day, Disp: , Rfl:   •  Insulin Pen Needle 31G X 8 MM MISC, Use daily Inject under the skin, Disp: 100 each, Rfl: 2  •  Lancets (onetouch ultrasoft) lancets, Use once daily, Disp: 100 each, Rfl: 2  •  metoprolol succinate (TOPROL-XL) 50 mg 24 hr tablet, Take 1 tablet (50 mg total) by mouth daily, Disp: 30 tablet, Rfl: 3  •  montelukast (SINGULAIR) 10 mg tablet, Take 10 mg by mouth daily at bedtime, Disp: , Rfl:   •  NovoFine Autocover Pen Needle 30G X 8 MM MISC, USE TWICE A DAY, Disp: 300 each, Rfl: 5  •  pregabalin (LYRICA) 100 mg capsule, Take 100 mg by mouth 2 (two) times a day, Disp: , Rfl:   •  rosuvastatin (CRESTOR) 10 MG tablet, take 1 tablet by mouth once daily, Disp: 90 tablet, Rfl: 1  •  semaglutide, 0 25 or 0 5 mg/dose, (Ozempic, 0 25 or 0 5 MG/DOSE,) 2 mg/1 5 mL injection pen, Use 0 25mg weekly for 4 weeks and then 0 5mg weekly, Disp: 6 mL, Rfl: 1  •  torsemide (DEMADEX) 20 mg tablet, Take 2 tablets in the AM, Disp: 90 tablet, Rfl: 1    Review of Systems   HENT: Negative  Eyes: Negative  Respiratory: Negative  Cardiovascular: Negative  Gastrointestinal: Negative  Endocrine: Negative  Musculoskeletal: Negative  Skin: Negative  Allergic/Immunologic: Negative  Neurological: Negative  Hematological: Negative  Psychiatric/Behavioral: Negative  Physical Exam:  Body mass index is 47 19 kg/m²  /62 (BP Location: Left arm, Patient Position: Sitting, Cuff Size: Large)   Pulse 63   Temp (!) 97 1 °F (36 2 °C) (Tympanic)   Ht 5' 2" (1 575 m)   Wt 117 kg (258 lb)   SpO2 96%   BMI 47 19 kg/m²    Vitals:    03/24/23 1330   Weight: 117 kg (258 lb)        Physical Exam  Constitutional:       General: She is not in acute distress  Appearance: She is well-developed  She is not ill-appearing  HENT:      Head: Normocephalic and atraumatic  Nose: Nose normal       Mouth/Throat:      Pharynx: Oropharynx is clear  Eyes:      Extraocular Movements: Extraocular movements intact  Conjunctiva/sclera: Conjunctivae normal    Neck:      Thyroid: No thyromegaly  Cardiovascular:      Rate and Rhythm: Normal rate  Pulmonary:      Effort: Pulmonary effort is normal    Musculoskeletal:         General: No deformity  Cervical back: Normal range of motion  Skin:     Capillary Refill: Capillary refill takes less than 2 seconds  Coloration: Skin is not pale  Findings: No rash  Neurological:      Mental Status: She is alert and oriented to person, place, and time     Psychiatric:         Behavior: Behavior normal          Labs:   Lab Results   Component Value Date    HGBA1C 8 2 (H) 03/23/2023       Lab Results   Component Value Date    [id number] 2 481 01/22/2023       Lab Results   Component Value Date    CREATININE 1 64 (H) 03/02/2023    CREATININE 1 58 (H) 01/27/2023    CREATININE 1 39 (H) 01/26/2023    BUN 50 (H) 03/02/2023    K 4 2 03/02/2023    CL 99 03/02/2023    CO2 33 (H) 03/02/2023     eGFR   Date Value Ref Range Status   03/02/2023 31 ml/min/1 73sq m Final       Lab Results   Component Value Date    ALT 13 03/02/2023    AST 16 03/02/2023    ALKPHOS 63 03/02/2023       Lab Results   Component Value Date    CHOLESTEROL 155 12/20/2022    CHOLESTEROL 141 07/20/2022    CHOLESTEROL 104 03/10/2022     Lab Results   Component Value Date    HDL 42 (L) 12/20/2022    HDL 35 (L) 07/20/2022    HDL 30 (L) 03/10/2022     Lab Results   Component Value Date    TRIG 259 (H) 12/20/2022    TRIG 376 (H) 07/20/2022    TRIG 132 03/10/2022     Lab Results   Component Value Date    [place] 113 12/20/2022    [place] 106 07/20/2022         Impression:  1  Type 2 diabetes mellitus with hyperglycemia, without long-term current use of insulin ([facility] )    2  KATRINA (obstructive sleep apnea)    3  Morbid obesity with BMI of 45 0-49 9, adult [facility])         Plan:    Anmol Royal was seen today for follow-up  Diagnoses and all orders for this visit:    Type 2 diabetes mellitus with hyperglycemia, without long-term current use of insulin ([facility] [address] )  She remains uncontrolled with an A1c of 8 2% but this is improved from 11%  Today we discussed options and agreed to increase semaglutide  To continue current insulin regimen J25 every 12 and Humalog 10 units 3 times daily AC  Over next visits, will continue to increase semaglutide and wean off prandial insulin as possible  Reiterated advised to try to increase exercise at home or at a facility  She has transportation limitations  Follow-up in 3 months  -     semaglutide, 1 mg/dose, (Ozempic, 1 MG/DOSE,) 4 mg/3 mL injection pen; Inject 0 75 mL (1 mg total) under the skin every 7 days  -     Ambulatory Referral to Ophthalmology; Future    KATRINA (obstructive sleep apnea)    Morbid obesity with BMI of 45 0-49 9, adult ([facility] [address] )  Today we discussed all aspects of obesity and metabolism including pathophysiology, risk factors, complications, goal of sustaining weight loss long term, usual propensity to regain weight with short term approaches, follow up needs and medications - efficacy and limitations  Briefly discussed bariatric surgery    Diet: carb controlled diet <1500cal/day/ VLCD, 64oz fluids/day lifestyle modifications: intermittent fasting and 10,000 steps/day  medical fitness training: muscle building  education: nutrition input      I have spent 32 minutes with patient today in which greater than 50% of this time was spent in counseling/coordination of care  Discussed with the patient and all questioned fully answered  She will call me if any problems arise  Educated/ Counseled patient on diagnostic test results, prognosis, risk vs benefit of treatment options, importance of treatment compliance, healthy life and lifestyle choices        Maritza Atkinson

## 2023-03-24 NOTE — TELEPHONE ENCOUNTER
Pt came back into office after appt stating that they forgot to mention the pharmacy will not prescribe any more test strips since the script says 2 times a day and she does it 4 times a day  Please advise and send new script to [facility] in [place]

## 2023-03-25 ENCOUNTER — HOSPITAL ENCOUNTER (INPATIENT)
Facility: HOSPITAL | Age: 73
LOS: 2 days | Discharge: HOME HEALTH | End: 2023-03-28
Attending: EMERGENCY MEDICINE | Admitting: INTERNAL MEDICINE

## 2023-03-25 ENCOUNTER — APPOINTMENT (EMERGENCY)
Dept: RADIOLOGY | Facility: HOSPITAL | Age: 73
End: 2023-03-25

## 2023-03-25 DIAGNOSIS — J44.9 CHRONIC OBSTRUCTIVE PULMONARY DISEASE, UNSPECIFIED COPD TYPE (HCC): ICD-10-CM

## 2023-03-25 DIAGNOSIS — R91.8 MULTIPLE PULMONARY NODULES DETERMINED BY COMPUTED TOMOGRAPHY OF LUNG: ICD-10-CM

## 2023-03-25 DIAGNOSIS — E11.00 TYPE 2 DIABETES MELLITUS WITH HYPEROSMOLARITY WITHOUT COMA, WITH LONG-TERM CURRENT USE OF INSULIN (HCC): ICD-10-CM

## 2023-03-25 DIAGNOSIS — J18.9 PNEUMONIA: ICD-10-CM

## 2023-03-25 DIAGNOSIS — I50.32 CHRONIC DIASTOLIC CONGESTIVE HEART FAILURE (HCC): ICD-10-CM

## 2023-03-25 DIAGNOSIS — C50.811 MALIGNANT NEOPLASM OF OVERLAPPING SITES OF RIGHT FEMALE BREAST (HCC): ICD-10-CM

## 2023-03-25 DIAGNOSIS — R07.89 CHEST TIGHTNESS: ICD-10-CM

## 2023-03-25 DIAGNOSIS — R21 RASH: ICD-10-CM

## 2023-03-25 DIAGNOSIS — R06.02 SOB (SHORTNESS OF BREATH): ICD-10-CM

## 2023-03-25 DIAGNOSIS — Z79.4 TYPE 2 DIABETES MELLITUS WITH HYPEROSMOLARITY WITHOUT COMA, WITH LONG-TERM CURRENT USE OF INSULIN (HCC): ICD-10-CM

## 2023-03-25 DIAGNOSIS — R06.00 DYSPNEA, UNSPECIFIED TYPE: Primary | ICD-10-CM

## 2023-03-25 LAB
ALBUMIN SERPL BCP-MCNC: 4 G/DL (ref 3.5–5)
ALP SERPL-CCNC: 60 U/L (ref 34–104)
ALT SERPL W P-5'-P-CCNC: 13 U/L (ref 7–52)
ANION GAP SERPL CALCULATED.3IONS-SCNC: 6 MMOL/L (ref 4–13)
AST SERPL W P-5'-P-CCNC: 17 U/L (ref 13–39)
BASOPHILS # BLD AUTO: 0.09 THOUSANDS/ÂΜL (ref 0–0.1)
BASOPHILS NFR BLD AUTO: 1 % (ref 0–1)
BILIRUB SERPL-MCNC: 0.27 MG/DL (ref 0.2–1)
BNP SERPL-MCNC: 43 PG/ML (ref 0–100)
BUN SERPL-MCNC: 49 MG/DL (ref 5–25)
CALCIUM SERPL-MCNC: 8.5 MG/DL (ref 8.4–10.2)
CARDIAC TROPONIN I PNL SERPL HS: 6 NG/L
CHLORIDE SERPL-SCNC: 100 MMOL/L (ref 96–108)
CO2 SERPL-SCNC: 35 MMOL/L (ref 21–32)
CREAT SERPL-MCNC: 1.72 MG/DL (ref 0.6–1.3)
EOSINOPHIL # BLD AUTO: 0.97 THOUSAND/ÂΜL (ref 0–0.61)
EOSINOPHIL NFR BLD AUTO: 12 % (ref 0–6)
ERYTHROCYTE [DISTWIDTH] IN BLOOD BY AUTOMATED COUNT: 14.4 % (ref 11.6–15.1)
GFR SERPL CREATININE-BSD FRML MDRD: 29 ML/MIN/1.73SQ M
GLUCOSE SERPL-MCNC: 184 MG/DL (ref 65–140)
HCT VFR BLD AUTO: 36.7 % (ref 34.8–46.1)
HGB BLD-MCNC: 11.6 G/DL (ref 11.5–15.4)
IMM GRANULOCYTES # BLD AUTO: 0.02 THOUSAND/UL (ref 0–0.2)
IMM GRANULOCYTES NFR BLD AUTO: 0 % (ref 0–2)
LYMPHOCYTES # BLD AUTO: 2.23 THOUSANDS/ÂΜL (ref 0.6–4.47)
LYMPHOCYTES NFR BLD AUTO: 27 % (ref 14–44)
MCH RBC QN AUTO: 30 PG (ref 26.8–34.3)
MCHC RBC AUTO-ENTMCNC: 31.6 G/DL (ref 31.4–37.4)
MCV RBC AUTO: 95 FL (ref 82–98)
MONOCYTES # BLD AUTO: 0.78 THOUSAND/ÂΜL (ref 0.17–1.22)
MONOCYTES NFR BLD AUTO: 9 % (ref 4–12)
NEUTROPHILS # BLD AUTO: 4.26 THOUSANDS/ÂΜL (ref 1.85–7.62)
NEUTS SEG NFR BLD AUTO: 51 % (ref 43–75)
NRBC BLD AUTO-RTO: 0 /100 WBCS
PLATELET # BLD AUTO: 163 THOUSANDS/UL (ref 149–390)
PMV BLD AUTO: 10.5 FL (ref 8.9–12.7)
POTASSIUM SERPL-SCNC: 4.2 MMOL/L (ref 3.5–5.3)
PROT SERPL-MCNC: 7.5 G/DL (ref 6.4–8.4)
RBC # BLD AUTO: 3.87 MILLION/UL (ref 3.81–5.12)
SODIUM SERPL-SCNC: 141 MMOL/L (ref 135–147)
WBC # BLD AUTO: 8.35 THOUSAND/UL (ref 4.31–10.16)

## 2023-03-25 RX ORDER — IPRATROPIUM BROMIDE AND ALBUTEROL SULFATE 2.5; .5 MG/3ML; MG/3ML
3 SOLUTION RESPIRATORY (INHALATION)
Status: DISCONTINUED | OUTPATIENT
Start: 2023-03-26 | End: 2023-03-25

## 2023-03-25 NOTE — Clinical Note
Case was discussed with [facility]NE and the patient's admission status was agreed to be Admission Status: observation status to the service of Dr Mark Griffin

## 2023-03-26 ENCOUNTER — APPOINTMENT (OUTPATIENT)
Dept: RADIOLOGY | Facility: HOSPITAL | Age: 73
End: 2023-03-26

## 2023-03-26 PROBLEM — R06.00 DYSPNEA: Status: ACTIVE | Noted: 2022-03-09

## 2023-03-26 PROBLEM — C50.811 MALIGNANT NEOPLASM OF OVERLAPPING SITES OF RIGHT FEMALE BREAST (HCC): Status: ACTIVE | Noted: 2023-03-26

## 2023-03-26 LAB
2HR DELTA HS TROPONIN: 0 NG/L
ALBUMIN SERPL BCP-MCNC: 3.6 G/DL (ref 3.5–5)
ALP SERPL-CCNC: 51 U/L (ref 34–104)
ALT SERPL W P-5'-P-CCNC: 12 U/L (ref 7–52)
ANION GAP SERPL CALCULATED.3IONS-SCNC: 6 MMOL/L (ref 4–13)
APTT PPP: 32 SECONDS (ref 23–37)
AST SERPL W P-5'-P-CCNC: 15 U/L (ref 13–39)
BASOPHILS # BLD AUTO: 0.11 THOUSANDS/ÂΜL (ref 0–0.1)
BASOPHILS NFR BLD AUTO: 1 % (ref 0–1)
BILIRUB SERPL-MCNC: 0.27 MG/DL (ref 0.2–1)
BUN SERPL-MCNC: 47 MG/DL (ref 5–25)
CALCIUM SERPL-MCNC: 8.5 MG/DL (ref 8.4–10.2)
CARDIAC TROPONIN I PNL SERPL HS: 6 NG/L
CHLORIDE SERPL-SCNC: 102 MMOL/L (ref 96–108)
CO2 SERPL-SCNC: 34 MMOL/L (ref 21–32)
CREAT SERPL-MCNC: 1.61 MG/DL (ref 0.6–1.3)
EOSINOPHIL # BLD AUTO: 1.22 THOUSAND/ÂΜL (ref 0–0.61)
EOSINOPHIL NFR BLD AUTO: 15 % (ref 0–6)
ERYTHROCYTE [DISTWIDTH] IN BLOOD BY AUTOMATED COUNT: 14.4 % (ref 11.6–15.1)
EST. AVERAGE GLUCOSE BLD GHB EST-MCNC: 186 MG/DL
GFR SERPL CREATININE-BSD FRML MDRD: 32 ML/MIN/1.73SQ M
GLUCOSE SERPL-MCNC: 128 MG/DL (ref 65–140)
GLUCOSE SERPL-MCNC: 131 MG/DL (ref 65–140)
GLUCOSE SERPL-MCNC: 137 MG/DL (ref 65–140)
GLUCOSE SERPL-MCNC: 148 MG/DL (ref 65–140)
GLUCOSE SERPL-MCNC: 225 MG/DL (ref 65–140)
HBA1C MFR BLD: 8.1 %
HCT VFR BLD AUTO: 35.4 % (ref 34.8–46.1)
HGB BLD-MCNC: 11.1 G/DL (ref 11.5–15.4)
IMM GRANULOCYTES # BLD AUTO: 0.02 THOUSAND/UL (ref 0–0.2)
IMM GRANULOCYTES NFR BLD AUTO: 0 % (ref 0–2)
INR PPP: 1.21 (ref 0.84–1.19)
LIPASE SERPL-CCNC: 32 U/L (ref 11–82)
LYMPHOCYTES # BLD AUTO: 1.87 THOUSANDS/ÂΜL (ref 0.6–4.47)
LYMPHOCYTES NFR BLD AUTO: 23 % (ref 14–44)
MAGNESIUM SERPL-MCNC: 2.2 MG/DL (ref 1.9–2.7)
MCH RBC QN AUTO: 30 PG (ref 26.8–34.3)
MCHC RBC AUTO-ENTMCNC: 31.4 G/DL (ref 31.4–37.4)
MCV RBC AUTO: 96 FL (ref 82–98)
MONOCYTES # BLD AUTO: 0.84 THOUSAND/ÂΜL (ref 0.17–1.22)
MONOCYTES NFR BLD AUTO: 11 % (ref 4–12)
NEUTROPHILS # BLD AUTO: 3.96 THOUSANDS/ÂΜL (ref 1.85–7.62)
NEUTS SEG NFR BLD AUTO: 50 % (ref 43–75)
NRBC BLD AUTO-RTO: 0 /100 WBCS
PHOSPHATE SERPL-MCNC: 6 MG/DL (ref 2.3–4.1)
PLATELET # BLD AUTO: 169 THOUSANDS/UL (ref 149–390)
PMV BLD AUTO: 10.8 FL (ref 8.9–12.7)
POTASSIUM SERPL-SCNC: 4.7 MMOL/L (ref 3.5–5.3)
PROT SERPL-MCNC: 7 G/DL (ref 6.4–8.4)
PROTHROMBIN TIME: 15.4 SECONDS (ref 11.6–14.5)
RBC # BLD AUTO: 3.7 MILLION/UL (ref 3.81–5.12)
SODIUM SERPL-SCNC: 142 MMOL/L (ref 135–147)
WBC # BLD AUTO: 8.02 THOUSAND/UL (ref 4.31–10.16)

## 2023-03-26 RX ORDER — PREDNISONE 20 MG/1
40 TABLET ORAL DAILY
Status: DISCONTINUED | OUTPATIENT
Start: 2023-03-26 | End: 2023-03-27

## 2023-03-26 RX ORDER — NYSTATIN 100000 [USP'U]/G
POWDER TOPICAL 2 TIMES DAILY
Status: DISCONTINUED | OUTPATIENT
Start: 2023-03-26 | End: 2023-03-28 | Stop reason: HOSPADM

## 2023-03-26 RX ORDER — MONTELUKAST SODIUM 10 MG/1
10 TABLET ORAL
Status: DISCONTINUED | OUTPATIENT
Start: 2023-03-26 | End: 2023-03-28 | Stop reason: HOSPADM

## 2023-03-26 RX ORDER — INSULIN LISPRO 100 [IU]/ML
1-6 INJECTION, SOLUTION INTRAVENOUS; SUBCUTANEOUS
Status: DISCONTINUED | OUTPATIENT
Start: 2023-03-26 | End: 2023-03-28 | Stop reason: HOSPADM

## 2023-03-26 RX ORDER — FLUTICASONE FUROATE AND VILANTEROL 200; 25 UG/1; UG/1
1 POWDER RESPIRATORY (INHALATION) DAILY
Status: DISCONTINUED | OUTPATIENT
Start: 2023-03-26 | End: 2023-03-28 | Stop reason: HOSPADM

## 2023-03-26 RX ORDER — SODIUM CHLORIDE 9 MG/ML
50 INJECTION, SOLUTION INTRAVENOUS CONTINUOUS
Status: DISCONTINUED | OUTPATIENT
Start: 2023-03-26 | End: 2023-03-27

## 2023-03-26 RX ORDER — INSULIN GLARGINE 100 [IU]/ML
25 INJECTION, SOLUTION SUBCUTANEOUS EVERY 12 HOURS SCHEDULED
Status: DISCONTINUED | OUTPATIENT
Start: 2023-03-26 | End: 2023-03-28 | Stop reason: HOSPADM

## 2023-03-26 RX ORDER — INSULIN LISPRO 100 [IU]/ML
1-5 INJECTION, SOLUTION INTRAVENOUS; SUBCUTANEOUS
Status: DISCONTINUED | OUTPATIENT
Start: 2023-03-26 | End: 2023-03-28 | Stop reason: HOSPADM

## 2023-03-26 RX ORDER — METOPROLOL SUCCINATE 50 MG/1
50 TABLET, EXTENDED RELEASE ORAL DAILY
Status: DISCONTINUED | OUTPATIENT
Start: 2023-03-26 | End: 2023-03-28 | Stop reason: HOSPADM

## 2023-03-26 RX ORDER — PREGABALIN 100 MG/1
100 CAPSULE ORAL 2 TIMES DAILY
Status: DISCONTINUED | OUTPATIENT
Start: 2023-03-26 | End: 2023-03-28 | Stop reason: HOSPADM

## 2023-03-26 RX ORDER — INSULIN LISPRO 100 [IU]/ML
10 INJECTION, SOLUTION INTRAVENOUS; SUBCUTANEOUS
Status: DISCONTINUED | OUTPATIENT
Start: 2023-03-26 | End: 2023-03-28 | Stop reason: HOSPADM

## 2023-03-26 RX ORDER — AMMONIUM LACTATE 12 G/100G
LOTION TOPICAL 2 TIMES DAILY
Status: DISCONTINUED | OUTPATIENT
Start: 2023-03-26 | End: 2023-03-28 | Stop reason: HOSPADM

## 2023-03-26 RX ORDER — TORSEMIDE 20 MG/1
20 TABLET ORAL DAILY
Status: DISCONTINUED | OUTPATIENT
Start: 2023-03-26 | End: 2023-03-28 | Stop reason: HOSPADM

## 2023-03-26 RX ORDER — PRAVASTATIN SODIUM 80 MG/1
80 TABLET ORAL
Status: DISCONTINUED | OUTPATIENT
Start: 2023-03-26 | End: 2023-03-28 | Stop reason: HOSPADM

## 2023-03-26 RX ORDER — FLUTICASONE PROPIONATE 50 MCG
1 SPRAY, SUSPENSION (ML) NASAL DAILY
Status: DISCONTINUED | OUTPATIENT
Start: 2023-03-26 | End: 2023-03-28 | Stop reason: HOSPADM

## 2023-03-26 RX ORDER — MAGNESIUM HYDROXIDE/ALUMINUM HYDROXICE/SIMETHICONE 120; 1200; 1200 MG/30ML; MG/30ML; MG/30ML
30 SUSPENSION ORAL EVERY 6 HOURS PRN
Status: DISCONTINUED | OUTPATIENT
Start: 2023-03-26 | End: 2023-03-28 | Stop reason: HOSPADM

## 2023-03-26 RX ORDER — IPRATROPIUM BROMIDE AND ALBUTEROL SULFATE 2.5; .5 MG/3ML; MG/3ML
3 SOLUTION RESPIRATORY (INHALATION) 3 TIMES DAILY
Status: DISCONTINUED | OUTPATIENT
Start: 2023-03-26 | End: 2023-03-28 | Stop reason: HOSPADM

## 2023-03-26 RX ORDER — DOCUSATE SODIUM 100 MG/1
100 CAPSULE, LIQUID FILLED ORAL DAILY
Status: DISCONTINUED | OUTPATIENT
Start: 2023-03-26 | End: 2023-03-28 | Stop reason: HOSPADM

## 2023-03-26 RX ORDER — ALBUTEROL SULFATE 90 UG/1
2 AEROSOL, METERED RESPIRATORY (INHALATION) EVERY 6 HOURS PRN
Status: DISCONTINUED | OUTPATIENT
Start: 2023-03-26 | End: 2023-03-28 | Stop reason: HOSPADM

## 2023-03-26 RX ORDER — ONDANSETRON 2 MG/ML
4 INJECTION INTRAMUSCULAR; INTRAVENOUS EVERY 6 HOURS PRN
Status: DISCONTINUED | OUTPATIENT
Start: 2023-03-26 | End: 2023-03-28 | Stop reason: HOSPADM

## 2023-03-26 RX ADMIN — INSULIN LISPRO 2 UNITS: 100 INJECTION, SOLUTION INTRAVENOUS; SUBCUTANEOUS at 21:06

## 2023-03-26 RX ADMIN — FLUTICASONE FUROATE AND VILANTEROL TRIFENATATE 1 PUFF: 200; 25 POWDER RESPIRATORY (INHALATION) at 14:49

## 2023-03-26 RX ADMIN — INSULIN LISPRO 10 UNITS: 100 INJECTION, SOLUTION INTRAVENOUS; SUBCUTANEOUS at 12:26

## 2023-03-26 RX ADMIN — PRAVASTATIN SODIUM 80 MG: 80 TABLET ORAL at 17:02

## 2023-03-26 RX ADMIN — PREDNISONE 40 MG: 20 TABLET ORAL at 14:21

## 2023-03-26 RX ADMIN — Medication: at 18:19

## 2023-03-26 RX ADMIN — PREGABALIN 100 MG: 100 CAPSULE ORAL at 10:03

## 2023-03-26 RX ADMIN — SODIUM CHLORIDE 50 ML/HR: 0.9 INJECTION, SOLUTION INTRAVENOUS at 04:44

## 2023-03-26 RX ADMIN — NYSTATIN: 100000 POWDER TOPICAL at 18:19

## 2023-03-26 RX ADMIN — ALBUTEROL SULFATE 2 PUFF: 90 AEROSOL, METERED RESPIRATORY (INHALATION) at 10:07

## 2023-03-26 RX ADMIN — Medication: at 10:05

## 2023-03-26 RX ADMIN — TORSEMIDE 20 MG: 20 TABLET ORAL at 10:03

## 2023-03-26 RX ADMIN — INSULIN GLARGINE 25 UNITS: 100 INJECTION, SOLUTION SUBCUTANEOUS at 10:03

## 2023-03-26 RX ADMIN — IPRATROPIUM BROMIDE AND ALBUTEROL SULFATE 3 ML: 2.5; .5 SOLUTION RESPIRATORY (INHALATION) at 20:25

## 2023-03-26 RX ADMIN — MONTELUKAST 10 MG: 10 TABLET, FILM COATED ORAL at 21:06

## 2023-03-26 RX ADMIN — APIXABAN 5 MG: 5 TABLET, FILM COATED ORAL at 10:02

## 2023-03-26 RX ADMIN — FLUTICASONE PROPIONATE 1 SPRAY: 50 SPRAY, METERED NASAL at 10:04

## 2023-03-26 RX ADMIN — APIXABAN 5 MG: 5 TABLET, FILM COATED ORAL at 17:03

## 2023-03-26 RX ADMIN — METOPROLOL SUCCINATE 50 MG: 50 TABLET, EXTENDED RELEASE ORAL at 10:04

## 2023-03-26 RX ADMIN — PREGABALIN 100 MG: 100 CAPSULE ORAL at 17:05

## 2023-03-26 RX ADMIN — INSULIN LISPRO 10 UNITS: 100 INJECTION, SOLUTION INTRAVENOUS; SUBCUTANEOUS at 08:48

## 2023-03-26 RX ADMIN — IPRATROPIUM BROMIDE AND ALBUTEROL SULFATE 3 ML: 2.5; .5 SOLUTION RESPIRATORY (INHALATION) at 14:16

## 2023-03-26 RX ADMIN — INSULIN LISPRO 10 UNITS: 100 INJECTION, SOLUTION INTRAVENOUS; SUBCUTANEOUS at 17:02

## 2023-03-26 RX ADMIN — DOCUSATE SODIUM 100 MG: 100 CAPSULE, LIQUID FILLED ORAL at 10:02

## 2023-03-26 RX ADMIN — INSULIN GLARGINE 25 UNITS: 100 INJECTION, SOLUTION SUBCUTANEOUS at 21:06

## 2023-03-26 NOTE — CONSULTS
Consultation - Pulmonary Medicine   Val Gandara 81 y o  female MRN: [id number]  Unit/Bed#: 33723 [place] [address] Encounter: [id number]      Assessment:  Acute on chronic hypoxemic respiratory failure  Reactive airway disease with acute exacerbation  Abnormal CT of the chest with tree-in-bud nodules  [facility]  Morbid obesity      Plan:   Acute on chronic hypoxemic respiratory failure with reactive airway disease with acute exacerbation  Currently on 2 L of oxygen by nasal cannula saturating at 93 to 94% titrate down as able to maintain oxygen saturation greater than 89%  We will add DuoNeb via the nebulizer 4 times daily  Add breo 200/25 1 puff daily  Continue with Singulair 10 mg daily at bedtime  Prednisone 40 mg daily for 30 days  Reviewed her CBC with increased eosinophilic count in the past year  Reviewed CT of the chest images recently done final report pending see some tree-in-bud nodularity in the right middle lobe as well as in the lingula no definite evidence of any consolidation    Obesity hypoventilation syndrome with prior history of hypercapnic respiratory failure as well needs to be on BiPAP has not been using it and does not want to use it here in the hospital has been she states understands the risks of not using the BiPAP  Outpatient pulmonary follow-up for PFT and resp allergy panel   Also had some lung nodules on the prior CT of the chest for follow-up 6 months  She is also scheduled for a split-night study as an outpatient  Discussed with [facility] attending Dr Jey Spencer     History of Present Illness   Physician Requesting Consult: Ajith Combs MD  Reason for Consult / Principal Problem: Shortness of breath  Hx and PE limited by: None  HPI: Val Gandara is a 81y o  year old female who is approximately 20-pack-year smoking history who quit in 1996, states she is currently retired 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, and she worked in the [employer] and [address] [place] [address] the brad, worked as a  as well, states she was not diagnosed with asthma in the past  She was admitted in the hospital before with acute on hypercapnic and hypoxemic respiratory failure she was qualified for BiPAP and discharged with the BiPAP  She states she is not able to use the BiPAP because of the mask issues  She was seen in the pulmonary office ordered a mask fitting appointment, states she did not have a chance to go for the mask appointment yet  She also has oxygen at home 2 L at nighttime only that she is using daytime she states she does not need the oxygen  Also recently she was diagnosed about a week ago with breast cancer states she needs surgery yet to meet the surgeon  For the past week she states she has been having increasing cough shortness of breath and wheezing, did not have any fevers no upper respiratory infection or head cold, progressively got worse that made her come into the ER yesterday  Inpatient consult to Pulmonology  Consult performed by: Tr Hendricks MD  Consult ordered by: Elly Mart MD          Review of Systems   Constitutional: Positive for fatigue  HENT: Positive for postnasal drip and rhinorrhea  Eyes: Negative  Respiratory: Positive for cough, shortness of breath and wheezing  Cardiovascular: Positive for leg swelling  Gastrointestinal: Negative  Endocrine: Negative  Genitourinary: Negative  Musculoskeletal: Negative  Skin: Negative  Neurological: Negative  Hematological: Negative  Psychiatric/Behavioral: Negative          Historical Information   Past Medical History:   Diagnosis Date   • SEBASTIAN (acute kidney injury) ([place] [address] ) 1/23/2023   • Anemia    • Asthma    • Chronic kidney disease    • COPD (chronic obstructive pulmonary disease) ([place] [address] )    • COVID-19     January 2022   • Diabetes mellitus ([place] [address] )    • GERD (gastroesophageal reflux disease)    • Hyperlipidemia    • Hypertension    • PONV (postoperative nausea and vomiting)    • SVT (supraventricular tachycardia) ([facility])      Past Surgical History:   Procedure Laterality Date   • APPENDECTOMY     • BREAST BIOPSY Right     years ago when she was 23   • BREAST BIOPSY Right 2023   • CYSTOSCOPY W/ LASER LITHOTRIPSY Left 2016    Procedure: CYSTOSCOPY URETEROSCOPY WITH LITHOTRIPSY HOLMIUM LASER, RETROGRADE PYELOGRAM AND INSERTION STENT URETERAL;  Surgeon: Radha Sloan MD;  Location: [address] [place] [address];  Service:    • DILATION AND CURETTAGE OF UTERUS     • IR THORACENTESIS  2022   • JOINT REPLACEMENT      right knee   • KNEE ARTHROPLASTY Right    • MAMMO STEREOTACTIC BREAST BIOPSY RIGHT (ALL INC) Right 3/13/2023   • AL ARTHROPLASTY GLENOHUMERAL JOINT TOTAL SHOULDER Left 2022    Procedure: ARTHROPLASTY SHOULDER REVERSE;  Surgeon: Migel Pfeiffer MD;  Location: [facility];  Service: Orthopedics   • AL CYSTO BLADDER W/URETERAL CATHETERIZATION Left 2016    Procedure: CYSTOSCOPY RETROGRADE PYELOGRAM WITH INSERTION STENT URETERAL, left;  Surgeon: Radha Sloan MD;  Location: [facility];  Service: Urology   • SHOULDER ARTHROTOMY Left      Social History   Social History     Substance and Sexual Activity   Alcohol Use Not Currently    Comment: rarely     Social History     Substance and Sexual Activity   Drug Use Never     E-Cigarette/Vaping   • E-Cigarette Use Never User      E-Cigarette/Vaping Substances   • Nicotine No    • THC No    • CBD No    • Flavoring No    • Other No    • Unknown No      Social History     Tobacco Use   Smoking Status Former   • Packs/day: 1 00   • Years: 20 00   • Pack years: 20 00   • Types: Cigarettes   • Quit date: 1996   • Years since quittin 7   Smokeless Tobacco Never     Occupational History: Currently retired    Family History: non-contributory    Meds/Allergies   current meds:   Current Facility-Administered Medications   Medication Dose Route Frequency   • albuterol (PROVENTIL HFA,VENTOLIN HFA) inhaler 2 puff  2 puff "Inhalation Q6H PRN   • aluminum-magnesium hydroxide-simethicone (MYLANTA) oral suspension 30 mL  30 mL Oral Q6H PRN   • ammonium lactate (LAC-HYDRIN) 12 % lotion   Topical BID   • apixaban (ELIQUIS) tablet 5 mg  5 mg Oral BID   • docusate sodium (COLACE) capsule 100 mg  100 mg Oral Daily   • fluticasone (FLONASE) 50 mcg/act nasal spray 1 spray  1 spray Nasal Daily   • insulin glargine (LANTUS) subcutaneous injection 25 Units 0 25 mL  25 Units Subcutaneous Q12H [facility]   • insulin lispro (HumaLOG) 100 units/mL subcutaneous injection 1-5 Units  1-5 Units Subcutaneous HS   • insulin lispro (HumaLOG) 100 units/mL subcutaneous injection 1-6 Units  1-6 Units Subcutaneous TID AC   • insulin lispro (HumaLOG) 100 units/mL subcutaneous injection 10 Units  10 Units Subcutaneous TID With Meals   • metoprolol succinate (TOPROL-XL) 24 hr tablet 50 mg  50 mg Oral Daily   • montelukast (SINGULAIR) tablet 10 mg  10 mg Oral HS   • ondansetron (ZOFRAN) injection 4 mg  4 mg Intravenous Q6H PRN   • pravastatin (PRAVACHOL) tablet 80 mg  80 mg Oral Daily With Dinner   • pregabalin (LYRICA) capsule 100 mg  100 mg Oral BID   • semaglutide (1 mg/dose) (Ozempic) injection pen 1 mg  1 mg Subcutaneous Q7 Days   • sodium chloride 0 9 % infusion  50 mL/hr Intravenous Continuous   • torsemide (DEMADEX) tablet 20 mg  20 mg Oral Daily       Allergies   Allergen Reactions   • Penicillins Hives   • Moxifloxacin Other (See Comments)     unknown   • Oxycodone-Acetaminophen Other (See Comments)     GI upset   • Zinc Acetate Other (See Comments)     unknown   • Penicillins Hives   • Asa [Aspirin] GI Intolerance   • Indocin [Indomethacin] Other (See Comments)     Made patient \"loopy\"   • Other Other (See Comments)     unknown   • Tannic Acid Rash       Objective   Vitals: Blood pressure 150/83, pulse 75, temperature 97 6 °F (36 4 °C), temperature source Oral, resp   rate 18, height 5' 2\" (1 575 m), weight 118 kg (260 lb 9 6 oz), SpO2 95 %, not currently " breastfeeding  ,Body mass index is 47 66 kg/m²  Intake/Output Summary (Last 24 hours) at 3/26/2023 1321  Last data filed at 3/26/2023 8842  Gross per 24 hour   Intake 120 ml   Output --   Net 120 ml     Invasive Devices     Peripheral Intravenous Line  Duration           Peripheral IV 03/26/23 Proximal;Right;Ventral (anterior) Forearm <1 day                Physical Exam  Sitting up on the chair in no acute respiratory distress  Eyes anicteric sclera, conjunctive is clear  ENT nares is patent, no evidence of any discharge  Neck no JVD, no cervical lymphadenopathy  Lungs diminished breath sounds bilaterally no rhonchi  Heart first and second heart sounds are heard no murmur or gallop is heard  Abdomen soft nontender bowel sounds are present  Bilateral lower extremity pedal edema  Skin no rashes no bruises  CNS awake alert and oriented x3  Lab Results:   CBC:   Lab Results   Component Value Date    WBC 8 02 03/26/2023    HGB 11 1 (L) 03/26/2023    HCT 35 4 03/26/2023    MCV 96 03/26/2023     03/26/2023    MCH 30 0 03/26/2023    MCHC 31 4 03/26/2023    RDW 14 4 03/26/2023    MPV 10 8 03/26/2023    NRBC 0 03/26/2023     Imaging Studies: I have personally reviewed pertinent films in PACS  EKG, Pathology, and Other Studies: I have personally reviewed pertinent reports      VTE Prophylaxis: Sequential compression device Mahesh Wilcox)     Code Status: Level 1 - Full Code  Advance Directive and Living Will:      Power of :    POLST:

## 2023-03-26 NOTE — ASSESSMENT & PLAN NOTE
S/p biopsy of right breast calcification positive for malignancy   - has not yet established care with oncologist or surgeon   - Known bilateral pulmonary nodules unclear significance being monitored   - CT CAP wo contrast ordered   - oncology consulted so patient can establish care

## 2023-03-26 NOTE — PLAN OF CARE
Problem: Potential for Falls  Goal: Patient will remain free of falls  Description: INTERVENTIONS:  - Educate patient/family on patient safety including physical limitations  - Instruct patient to call for assistance with activity   - Consult OT/PT to assist with strengthening/mobility   - Keep Call bell within reach  - Keep bed low and locked with side rails adjusted as appropriate  - Keep care items and personal belongings within reach  - Initiate and maintain comfort rounds  - Make Fall Risk Sign visible to staff  - Offer Toileting every 2  Hours, in advance of need  - Initiate/Maintain bed alarm  - Obtain necessary fall risk management equipment: yellow socks  - Apply yellow socks and bracelet for high fall risk patients  - Consider moving patient to room near nurses station  Outcome: Progressing     Problem: Nutrition/Hydration-ADULT  Goal: Nutrient/Hydration intake appropriate for improving, restoring or maintaining nutritional needs  Description: Monitor and assess patient's nutrition/hydration status for malnutrition  Collaborate with interdisciplinary team and initiate plan and interventions as ordered  Monitor patient's weight and dietary intake as ordered or per policy  Utilize nutrition screening tool and intervene as necessary  Determine patient's food preferences and provide high-protein, high-caloric foods as appropriate       INTERVENTIONS:  - Monitor oral intake, urinary output, labs, and treatment plans  - Assess nutrition and hydration status and recommend course of action  - Evaluate amount of meals eaten  - Assist patient with eating if necessary   - Allow adequate time for meals  - Recommend/ encourage appropriate diets, oral nutritional supplements, and vitamin/mineral supplements  - Order, calculate, and assess calorie counts as needed  - Recommend, monitor, and adjust tube feedings and TPN/PPN based on assessed needs  - Assess need for intravenous fluids  - Provide specific nutrition/hydration education as appropriate  - Include patient/family/caregiver in decisions related to nutrition  Outcome: Progressing     Problem: SAFETY ADULT  Goal: Patient will remain free of falls  Description: INTERVENTIONS:  - Educate patient/family on patient safety including physical limitations  - Instruct patient to call for assistance with activity   - Consult OT/PT to assist with strengthening/mobility   - Keep Call bell within reach  - Keep bed low and locked with side rails adjusted as appropriate  - Keep care items and personal belongings within reach  - Initiate and maintain comfort rounds  - Make Fall Risk Sign visible to staff  - Offer Toileting every 2  Hours, in advance of need  - Initiate/Maintain bed alarm  - Obtain necessary fall risk management equipment: yellow socks    - Apply yellow socks and bracelet for high fall risk patients  - Consider moving patient to room near nurses station  Outcome: Progressing  Goal: Maintain or return to baseline ADL function  Description: INTERVENTIONS:  -  Assess patient's ability to carry out ADLs; assess patient's baseline for ADL function and identify physical deficits which impact ability to perform ADLs (bathing, care of mouth/teeth, toileting, grooming, dressing, etc )  - Assess/evaluate cause of self-care deficits   - Assess range of motion  - Assess patient's mobility; develop plan if impaired  - Assess patient's need for assistive devices and provide as appropriate  - Encourage maximum independence but intervene and supervise when necessary  - Involve family in performance of ADLs  - Assess for home care needs following discharge   - Consider OT consult to assist with ADL evaluation and planning for discharge  - Provide patient education as appropriate  Outcome: Progressing  Goal: Maintains/Returns to pre admission functional level  Description: INTERVENTIONS:  - Perform BMAT or MOVE assessment daily    - Set and communicate daily mobility goal to care team and patient/family/caregiver  - Collaborate with rehabilitation services on mobility goals if consulted  - Perform Range of Motion 3 times a day  - Reposition patient every 2 hours    - Ambulate patient 3  times a day  - Out of bed to chair 3 times a day   - Out of bed for meals 3 times a day  - Out of bed for toileting  - Record patient progress and toleration of activity level   Outcome: Progressing     Problem: DISCHARGE PLANNING  Goal: Discharge to home or other facility with appropriate resources  Description: INTERVENTIONS:  - Identify barriers to discharge w/patient and caregiver  - Arrange for needed discharge resources and transportation as appropriate  - Identify discharge learning needs (meds, wound care, etc )  - Arrange for interpretive services to assist at discharge as needed  - Refer to Case Management Department for coordinating discharge planning if the patient needs post-hospital services based on physician/advanced practitioner order or complex needs related to functional status, cognitive ability, or social support system  Outcome: Progressing     Problem: RESPIRATORY - ADULT  Goal: Achieves optimal ventilation and oxygenation  Description: INTERVENTIONS:  - Assess for changes in respiratory status  - Assess for changes in mentation and behavior  - Position to facilitate oxygenation and minimize respiratory effort  - Oxygen administered by appropriate delivery if ordered  - Initiate smoking cessation education as indicated  - Encourage broncho-pulmonary hygiene including cough, deep breathe, Incentive Spirometry  - Assess the need for suctioning and aspirate as needed  - Assess and instruct to report SOB or any respiratory difficulty  - Respiratory Therapy support as indicated  Outcome: Progressing     Problem: SKIN/TISSUE INTEGRITY - ADULT  Goal: Skin Integrity remains intact(Skin Breakdown Prevention)  Description: Assess:  -Perform Hung assessment every shift  -Clean and moisturize skin every shift  -Inspect skin when repositioning, toileting, and assisting with ADLS  -Assess under medical devices such as n/c  every shift  -Assess extremities for adequate circulation and sensation     Bed Management:  -Have minimal linens on bed & keep smooth, unwrinkled  -Change linens as needed when moist or perspiring  -Avoid sitting or lying in one position for more than 2 hours while in bed  -Keep HOB at 45 degrees     Toileting:  -Offer bedside commode  -Assess for incontinence every 2 hours  -Use incontinent care products after each incontinent episode such as barrier cream    Activity:  -Encourage activity and walks on unit  -Encourage or provide ROM exercises   -Turn and reposition patient every 2 Hours  -Use appropriate equipment to lift or move patient in bed  -Instruct/ Assist with weight shifting every 15 minutes  when out of bed in chair  -Consider limitation of chair time 4  hour intervals    Skin Care:  -Avoid use of baby powder, tape, friction and shearing, hot water or constrictive clothing  -Relieve pressure over bony prominences using waffle cushion   -Do not massage red bony areas    Next Steps:  -Consider consults to  interdisciplinary teams such as  wound care, PT/OT  Outcome: Progressing  Goal: Pressure injury heals and does not worsen  Description: Interventions:  - Implement low air loss mattress or specialty surface (Criteria met)  - Apply silicone foam dressing        Outcome: Progressing

## 2023-03-26 NOTE — H&P
[place] 47  H&P  Name: Palak Bartholomew  MRN: [id number]  Unit/Bed#: 4 [place] 408-01 I Date of Admission: 3/25/2023   Date of Service: 3/26/2023 I Hospital Day: 0      Assessment/Plan   * Dyspnea  Assessment & Plan  - etiology: secondary to pneumonia +/- COPD / asthma exacerbation   - started 2 weeks ago and continue to worsen in spite of outpatient rx including abx  - morbid obesity unable to appreciate lung sounds   - not using accessory muscles but requiring oxygen on room air more than her usual baseline requirement of 2 liters while sleeping only  - unlikely PE since on eliquis and recent CTA chest was negative  for PE earlier this month   - has known hx of bilateral pul nodules that have been followed up outpatient and as per pt she was told too small to characterize   - Pulmonary and ID consulted       Malignant neoplasm of overlapping sites of right female breast [facility])  Assessment & Plan  S/p biopsy of right breast calcification positive for malignancy   - has not yet established care with oncologist or surgeon   - Known bilateral pulmonary nodules unclear significance being monitored   - CT CAP wo contrast ordered   - oncology consulted so patient can establish care    Mixed hyperlipidemia  Assessment & Plan  On crestor - changed to pravachol while inpatient     [facility] ([facility]  Assessment & Plan  On metoprolol and eliquis - continued  Tele monitoring     Essential hypertension  Assessment & Plan  On demadex and toprol - continue     Gastroesophageal reflux disease  Assessment & Plan  - on Protonix     Morbid obesity with BMI of 45 0-49 9, adult ([facility] )  Assessment & Plan  - counseled weight loss   - hypothyroidism on synthroid - check levels of TSH    Obstructive sleep apnea  Assessment & Plan  - cpap non complain   - uses 2 liters oxygen for sleeping   - now requiring oxygen even at daytime and even on rest   - pulm on board   - neb rx  And steroids     Type 2 diabetes mellitus with hyperglycemia, without long-term current use of insulin (HCC)  Assessment & Plan  Lab Results   Component Value Date    HGBA1C 8 2 (H) 03/23/2023       Recent Labs     03/26/23  0726   POCGLU 128       Blood Sugar Average: Last 72 hrs:  (P) 128       - on ozempic to help with weight loss - but now has cancer - ozempic held while inpatient   - continue with insulin, insulin sliding scale and carb restricted ADA diet     Multiple pulmonary nodules determined by computed tomography of lung  Assessment & Plan  - being followed up outpatient   - no bx done since as per patient too small to characterize          VTE Pharmacologic Prophylaxis: VTE Score: 12 on eliquix   Code Status: Level 1 - Full Code full code   Discussion with family: Patient declined call to   Anticipated Length of Stay: Patient will be admitted on an inpatient basis with an anticipated length of stay of greater than 2 midnights secondary to shortness of breath and failure of out pt abx for penumonia  Total Time Spent on Date of Encounter in care of patient: 40 minutes This time was spent on one or more of the following: performing physical exam; counseling and coordination of care; obtaining or reviewing history; documenting in the medical record; reviewing/ordering tests, medications or procedures; communicating with other healthcare professionals and discussing with patient's family/caregivers  Chief Complaint: shortness of breath coughing    History of Present Illness:  Estrada Sep is a 81 y o  female with a PMH of obesity, chronic  Lung nodules zack;aterally being monitored,KATRINA non complaint to CPAP - uses 2 L oxygen at sleep, DM, HTN who was recently found to have breast cancer of right breast earlier this month  She has not yet seen surgeon  This is because patient developed cough productive yellow no blood and progressively worsenong sob since last 2 weeks   She was noted to have right sided pneumonia that failed out patient abx  She is on eliquis for A fib and additionally PE was ruled out earlier this month as well  Reports subjective fever and increased oxygen demand even at rest  Vaccinated against covid  Patient admitted for management of shortness of breath and pneumonia  Review of Systems:  Review of Systems   Constitutional: Positive for activity change, chills, fatigue and fever  Respiratory: Positive for cough and shortness of breath  All other systems reviewed and are negative        Past Medical and Surgical History:   Past Medical History:   Diagnosis Date   • SEBASTIAN (acute kidney injury) ([place] [id number] ) 1/23/2023   • Anemia    • Asthma    • Chronic kidney disease    • COPD (chronic obstructive pulmonary disease) ([place] [id number] )    • COVID-19 January 2022   • Diabetes mellitus ([place] [address] )    • GERD (gastroesophageal reflux disease)    • Hyperlipidemia    • Hypertension    • PONV (postoperative nausea and vomiting)    • SVT (supraventricular tachycardia) ([facility])        Past Surgical History:   Procedure Laterality Date   • APPENDECTOMY     • BREAST BIOPSY Right     years ago when she was 23   • BREAST BIOPSY Right 03/13/2023   • CYSTOSCOPY W/ LASER LITHOTRIPSY Left 07/12/2016    Procedure: CYSTOSCOPY URETEROSCOPY WITH LITHOTRIPSY HOLMIUM LASER, RETROGRADE PYELOGRAM AND INSERTION STENT URETERAL;  Surgeon: Umesh Mendez MD;  Location: [address] [place] [address];  Service:    • DILATION AND CURETTAGE OF UTERUS     • IR THORACENTESIS  03/18/2022   • JOINT REPLACEMENT      right knee   • KNEE ARTHROPLASTY Right    • MAMMO STEREOTACTIC BREAST BIOPSY RIGHT (ALL INC) Right 3/13/2023   • AZ ARTHROPLASTY GLENOHUMERAL JOINT TOTAL SHOULDER Left 03/01/2022    Procedure: ARTHROPLASTY SHOULDER REVERSE;  Surgeon: Zak Holguin MD;  Location: [address] [place] [address];  Service: Orthopedics   • AZ CYSTO BLADDER W/URETERAL CATHETERIZATION Left 06/29/2016    Procedure: CYSTOSCOPY RETROGRADE PYELOGRAM WITH INSERTION STENT URETERAL, left;  Surgeon: Naheed Hoang MD;  Location: [facility];  Service: Urology   • SHOULDER ARTHROTOMY Left        Meds/Allergies:  Prior to Admission medications    Medication Sig Start Date End Date Taking?  Authorizing Provider   albuterol (PROVENTIL HFA,VENTOLIN HFA) 90 mcg/act inhaler Inhale 2 puffs every 6 (six) hours as needed for wheezing 7/28/22  Yes Isabel Cadet MD   apixaban (ELIQUIS) 5 mg Take 5 mg by mouth 2 (two) times a day   Yes Historical Provider, MD BRAXTON Complex-C-Folic Acid (triphrocaps) 1 MG CAPS take 1 capsule by mouth once daily 12/27/22  Yes Isabel Cadet MD   docusate sodium (COLACE) 100 mg capsule Take 100 mg by mouth in the morning   Yes Historical Provider, MD   fluticasone (FLONASE) 50 mcg/act nasal spray 1 spray into each nostril daily 11/3/22  Yes RK Yao   insulin glargine (Toujeo SoloStar) 300 units/mL CONCENTRATED U-300 injection pen (1-unit dial) Inject 25 Units under the skin every 12 (twelve) hours   Yes Historical Provider, MD   insulin lispro (HumaLOG KwikPen) 100 units/mL injection pen Use 10 units prior to each meal +1 unit per 50 above 150 mg/dL 2/27/23  Yes Steve Simon MD   metoprolol succinate (TOPROL-XL) 50 mg 24 hr tablet Take 1 tablet (50 mg total) by mouth daily 11/8/22  Yes Isabel Cadet MD   montelukast (SINGULAIR) 10 mg tablet Take 10 mg by mouth daily at bedtime   Yes Historical Provider, MD   pregabalin (LYRICA) 100 mg capsule Take 100 mg by mouth 2 (two) times a day   Yes Historical Provider, MD   rosuvastatin (CRESTOR) 10 MG tablet take 1 tablet by mouth once daily 1/3/23  Yes Isabel Cadet MD   torsemide [facility]) 20 mg tablet Take 2 tablets in the AM 9/9/22  Yes Franchesca Bronson MD   ammonium lactate (LAC-HYDRIN) 12 % lotion APPLY TOPICALLY TO AFFECTED AREAS twice a day 12/16/21   Historical Provider, MD   azithromycin (Zithromax) 250 mg tablet Take 2 tablets (500 mg total) by mouth daily for 1 day, THEN 1 "tablet (250 mg total) daily for 4 days  3/21/23 3/26/23  Isabel Cadet MD   Continuous Blood Gluc  (FreeStyle Jett 2 [place]) NORM Use 1 Units continuous 2/9/23   Steve Simon MD   Continuous Blood Gluc Sensor (FreeStyle Dawn 2 Sensor) MISC Use 1 Units every 14 (fourteen) days 2/9/23   Steve Simon MD   fluticasone (Flovent HFA) 110 MCG/ACT inhaler Inhale 2 puffs 2 (two) times a day Rinse mouth after use  Patient not taking: Reported on 3/26/2023 3/23/23   Isabel Cadet MD   glucose blood test strip Use 1 each 4 (four) times a day (before meals and at bedtime) Use one 2 times daily 3/24/23   Steve Smion MD   Insulin Pen Needle (BD Pen Needle Pilar 2nd Gen) 32G X 4 MM MISC For use with insulin pen  Pharmacy may dispense brand covered by insurance  5/23/22   Erika Sims MD   Insulin Pen Needle (NovoFine Autocover) 30G X 8 MM MISC Inject under the skin daily 11/8/22   Isabel Cadet MD   Insulin Pen Needle 30G X 8 MM MISC 2 (two) times a day    Historical Provider, MD   Insulin Pen Needle 31G X 8 MM MISC Use daily Inject under the skin 12/8/22 3/24/23  Isabel Cadet MD   Lancets (onetouch ultrasoft) lancets Use once daily 4/8/22   Isabel Cadet MD   NovoFine Autocover Pen Needle 30G X 8 MM MISC USE TWICE A DAY 2/23/23   Isabel Cadet MD   semaglutide, 1 mg/dose, (Ozempic, 1 MG/DOSE,) 4 mg/3 mL injection pen Inject 0 75 mL (1 mg total) under the skin every 7 days 3/24/23   Steve Simon MD     I have reviewed home medications using recent Epic encounter  Allergies:    Allergies   Allergen Reactions   • Penicillins Hives   • Moxifloxacin Other (See Comments)     unknown   • Oxycodone-Acetaminophen Other (See Comments)     GI upset   • Zinc Acetate Other (See Comments)     unknown   • Penicillins Hives   • Asa [Aspirin] GI Intolerance   • Indocin [Indomethacin] Other (See Comments)     Made patient \"loopy\"   • Other Other (See " "Comments)     unknown   • Tannic Acid Rash       Social History:  Marital Status: Single      Patient Pre-hospital Living Situation: Home  Patient Pre-hospital Level of Mobility: unable to be assessed at time of evaluation     Substance Use History:   Social History     Substance and Sexual Activity   Alcohol Use Not Currently    Comment: rarely     Social History     Tobacco Use   Smoking Status Former   • Packs/day: 1 00   • Years: 20    • Pack years: 20    • Types: Cigarettes   • Quit date: 1996   • Years since quittin 7   Smokeless Tobacco Never     Social History     Substance and Sexual Activity   Drug Use Never       Family History:  Family History   Problem Relation Age of Onset   • Heart disease Mother    • Diabetes Father    • Thyroid cancer Sister    • Emphysema Maternal Grandmother    • Heart disease Family        Physical Exam:     Vitals:   Blood Pressure: 150/83 (23 08)  Pulse: 75 (23)  Temperature: 97 6 °F (36 4 °C) (23)  Temp Source: Oral (23)  Respirations: 18 (23)  Height: 5' 2\" (157 5 cm) (23)  Weight - Scale: 118 kg (260 lb 9 6 oz) (23)  SpO2: 95 % (23)    Physical Exam  Constitutional:       Appearance: Normal appearance  She is obese  HENT:      Head: Normocephalic and atraumatic  Mouth/Throat:      Mouth: Mucous membranes are moist    Eyes:      Extraocular Movements: Extraocular movements intact  Pupils: Pupils are equal, round, and reactive to light  Cardiovascular:      Rate and Rhythm: Normal rate and regular rhythm  Heart sounds: Normal heart sounds  Pulmonary:      Effort: Pulmonary effort is normal       Breath sounds: Rhonchi and rales present  Abdominal:      General: Bowel sounds are normal       Palpations: Abdomen is soft  Tenderness: There is no guarding or rebound  Musculoskeletal:         General: Normal range of motion        Cervical back: Normal " range of motion and neck supple  Skin:     General: Skin is warm  Neurological:      General: No focal deficit present  Mental Status: She is alert and oriented to person, place, and time  Mental status is at baseline  Psychiatric:         Mood and Affect: Mood normal            Additional Data:     Lab Results:  Results from last 7 days   Lab Units 03/26/23  0723   WBC Thousand/uL 8 02   HEMOGLOBIN g/dL 11 1*   HEMATOCRIT % 35 4   PLATELETS Thousands/uL 169   NEUTROS PCT % 50   LYMPHS PCT % 23   MONOS PCT % 11   EOS PCT % 15*     Results from last 7 days   Lab Units 03/26/23  0723   SODIUM mmol/L 142   POTASSIUM mmol/L 4 7   CHLORIDE mmol/L 102   CO2 mmol/L 34*   BUN mg/dL 47*   CREATININE mg/dL 1 61*   ANION GAP mmol/L 6   CALCIUM mg/dL 8 5   ALBUMIN g/dL 3 6   TOTAL BILIRUBIN mg/dL 0 27   ALK PHOS U/L 51   ALT U/L 12   AST U/L 15   GLUCOSE RANDOM mg/dL 131     Results from last 7 days   Lab Units 03/26/23  0723   INR  1 21*     Results from last 7 days   Lab Units 03/26/23  0726   POC GLUCOSE mg/dl 128     Results from last 7 days   Lab Units 03/23/23  1630   HEMOGLOBIN A1C % 8 2*           Lines/Drains:  Invasive Devices     Peripheral Intravenous Line  Duration           Peripheral IV 03/26/23 Proximal;Right;Ventral (anterior) Forearm <1 day                    Imaging: No pertinent imaging reviewed  XR chest 1 view portable   Final Result by Dontrell Romeo MD (03/26 7885)      No acute cardiopulmonary disease  Workstation performed: [id number]         CT chest abdomen pelvis wo contrast    (Results Pending)       EKG and Other Studies Reviewed on Admission:   · EKG: NSR  HR 87     ** Please Note: This note has been constructed using a voice recognition system   **

## 2023-03-26 NOTE — ASSESSMENT & PLAN NOTE
Lab Results   Component Value Date    HGBA1C 8 2 (H) 03/23/2023       Recent Labs     03/26/23  0726   POCGLU 128       Blood Sugar Average: Last 72 hrs:  (P) 128       - on ozempic to help with weight loss - but now has cancer - ozempic held while inpatient   - continue with insulin, insulin sliding scale and carb restricted ADA diet

## 2023-03-26 NOTE — QUICK NOTE
Follow-up note    Patient was seen and examined  Reports improvement in cough and shortness of breath since admission earlier today  Denies any chest pain  Reports improvement in wheezing  Noted to be comfortably sitting in bed  Vitals afebrile saturating adequately on 2 L of supplemental oxygen  Other vital signs stable  On exam, obese, comfortable  Pleasant  HEENT-no JVD, cardiac S1-S2 regular  Chest- decreased breath sound bilaterally, scattered wheezing noted  Abdomen-soft nontender bowel sounds present  Extremity-bilateral lower extremity edema noted    Lab revealed stable hemoglobin  Normal white count  Eosinophilia noted  Cardiac marker proBNP normal   CMP unremarkable with creatinine close to baseline  Chest x-ray without any acute abnormality    CT scan pending    · Continue bronchodilators   · Sputum culture  · Monitor respiratory status  · Follow-up CT scan  · Pulmonary input noted, patient was started on prednisone 40 mg daily  · Supplemental oxygen as needed  · Continue home meds  · Monitor blood glucose  · Pulmonary follow-up

## 2023-03-26 NOTE — ASSESSMENT & PLAN NOTE
- etiology: secondary to pneumonia +/- COPD / asthma exacerbation   - started 2 weeks ago and continue to worsen in spite of outpatient rx including abx  - morbid obesity unable to appreciate lung sounds   - not using accessory muscles but requiring oxygen on room air more than her usual baseline requirement of 2 liters while sleeping only  - unlikely PE since on eliquis and recent CTA chest was negative  for PE earlier this month   - has known hx of bilateral pul nodules that have been followed up outpatient and as per pt she was told too small to characterize   - Pulmonary and ID consulted

## 2023-03-26 NOTE — ED PROVIDER NOTES
History  Chief Complaint   Patient presents with   • Shortness of Breath     States her PCP ordered ABX over the phone and patient had outpatient CXR done  Completed the Zithrommax tonight  Continues with cough and SOB  Patient uses 2 LNC at bedtime ( does not use her CPAP ) Sat is 87 % after ambulating to triage, placed on 2 LNC sat is 96 %  Coughing up cloudy phlegm      46-year-old female with a history of chronic cardiopulmonary disease presents to the emergency department for evaluation of about a week of worsening shortness of breath and chest tightness  History provided by:  Patient   used: No        Prior to Admission Medications   Prescriptions Last Dose Informant Patient Reported? Taking? B Complex-C-Folic Acid (triphrocaps) 1 MG CAPS   No No   Sig: take 1 capsule by mouth once daily   Continuous Blood Gluc  (FreeStyle Dawn 2 [place]) NORM   No No   Sig: Use 1 Units continuous   Continuous Blood Gluc Sensor (FreeStyle Dawn 2 Sensor) MISC   No No   Sig: Use 1 Units every 14 (fourteen) days   Insulin Pen Needle (BD Pen Needle Pilar 2nd Gen) 32G X 4 MM MISC   No No   Sig: For use with insulin pen  Pharmacy may dispense brand covered by insurance     Insulin Pen Needle (NovoFine Autocover) 30G X 8 MM MISC   No No   Sig: Inject under the skin daily   Insulin Pen Needle 30G X 8 MM MISC   Yes No   Si (two) times a day   Insulin Pen Needle 31G X 8 MM MISC   No No   Sig: Use daily Inject under the skin   Lancets (onetouch ultrasoft) lancets   No No   Sig: Use once daily   NovoFine Autocover Pen Needle 30G X 8 MM MISC   No No   Sig: USE TWICE A DAY   albuterol (PROVENTIL HFA,VENTOLIN HFA) 90 mcg/act inhaler   No No   Sig: Inhale 2 puffs every 6 (six) hours as needed for wheezing   ammonium lactate (LAC-HYDRIN) 12 % lotion   Yes No   Sig: APPLY TOPICALLY TO AFFECTED AREAS twice a day   apixaban (ELIQUIS) 5 mg   Yes No   Sig: Take 5 mg by mouth 2 (two) times a day   azithromycin (Zithromax) 250 mg tablet   No No   Sig: Take 2 tablets (500 mg total) by mouth daily for 1 day, THEN 1 tablet (250 mg total) daily for 4 days  docusate sodium (COLACE) 100 mg capsule   Yes No   Sig: Take 100 mg by mouth in the morning   fluticasone (FLONASE) 50 mcg/act nasal spray   No No   Si spray into each nostril daily   fluticasone (Flovent HFA) 110 MCG/ACT inhaler   No No   Sig: Inhale 2 puffs 2 (two) times a day Rinse mouth after use     glucose blood test strip   No No   Sig: Use 1 each 4 (four) times a day (before meals and at bedtime) Use one 2 times daily   insulin glargine (Toujeo SoloStar) 300 units/mL CONCENTRATED U-300 injection pen (1-unit dial)   Yes No   Sig: Inject 25 Units under the skin every 12 (twelve) hours   insulin lispro (HumaLOG KwikPen) 100 units/mL injection pen   No No   Sig: Use 10 units prior to each meal +1 unit per 50 above 150 mg/dL   metoprolol succinate (TOPROL-XL) 50 mg 24 hr tablet   No No   Sig: Take 1 tablet (50 mg total) by mouth daily   montelukast (SINGULAIR) 10 mg tablet   Yes No   Sig: Take 10 mg by mouth daily at bedtime   pregabalin (LYRICA) 100 mg capsule   Yes No   Sig: Take 100 mg by mouth 2 (two) times a day   rosuvastatin (CRESTOR) 10 MG tablet   No No   Sig: take 1 tablet by mouth once daily   semaglutide, 1 mg/dose, (Ozempic, 1 MG/DOSE,) 4 mg/3 mL injection pen   No No   Sig: Inject 0 75 mL (1 mg total) under the skin every 7 days   torsemide (DEMADEX) 20 mg tablet   No No   Sig: Take 2 tablets in the AM      Facility-Administered Medications: None       Past Medical History:   Diagnosis Date   • SEBASTIAN (acute kidney injury) ([place] [address] ) 2023   • Anemia    • Asthma    • Chronic kidney disease    • COPD (chronic obstructive pulmonary disease) ([place] [address] )    • COVID-2022   • Diabetes mellitus ([place] [address] )    • GERD (gastroesophageal reflux disease)    • Hyperlipidemia    • Hypertension    • PONV (postoperative nausea and vomiting)    • SVT (supraventricular tachycardia) ([facility] [address] )        Past Surgical History:   Procedure Laterality Date   • APPENDECTOMY     • BREAST BIOPSY Right     years ago when she was 23   • BREAST BIOPSY Right 2023   • CYSTOSCOPY W/ LASER LITHOTRIPSY Left 2016    Procedure: CYSTOSCOPY URETEROSCOPY WITH LITHOTRIPSY HOLMIUM LASER, RETROGRADE PYELOGRAM AND INSERTION STENT URETERAL;  Surgeon: Ursula Carter MD;  Location: [address] [place] [address];  Service:    • DILATION AND CURETTAGE OF UTERUS     • IR THORACENTESIS  2022   • JOINT REPLACEMENT      right knee   • KNEE ARTHROPLASTY Right    • MAMMO STEREOTACTIC BREAST BIOPSY RIGHT (ALL INC) Right 3/13/2023   • OH ARTHROPLASTY GLENOHUMERAL JOINT TOTAL SHOULDER Left 2022    Procedure: ARTHROPLASTY SHOULDER REVERSE;  Surgeon: Tahir Drake MD;  Location: [facility];  Service: Orthopedics   • OH CYSTO BLADDER W/URETERAL CATHETERIZATION Left 2016    Procedure: CYSTOSCOPY RETROGRADE PYELOGRAM WITH INSERTION STENT URETERAL, left;  Surgeon: Ursula Carter MD;  Location: [facility];  Service: Urology   • SHOULDER ARTHROTOMY Left        Family History   Problem Relation Age of Onset   • Heart disease Mother    • Diabetes Father    • Thyroid cancer Sister    • Emphysema Maternal Grandmother    • Heart disease Family      I have reviewed and agree with the history as documented  E-Cigarette/Vaping   • E-Cigarette Use Never User      E-Cigarette/Vaping Substances   • Nicotine No    • THC No    • CBD No    • Flavoring No    • Other No    • Unknown No      Social History     Tobacco Use   • Smoking status: Former     Packs/day: 1 00     Years: 20 00     Pack years: 20 00     Types: Cigarettes     Quit date: 1996     Years since quittin 7   • Smokeless tobacco: Never   Vaping Use   • Vaping Use: Never used   Substance Use Topics   • Alcohol use: Not Currently     Comment: rarely   • Drug use: Never       Review of Systems   Constitutional: Negative for fever     Respiratory: Positive for cough, chest tightness and shortness of breath  Cardiovascular: Positive for leg swelling  Negative for chest pain  Gastrointestinal: Negative for blood in stool, nausea and vomiting  Musculoskeletal: Negative for back pain  Skin: Negative for color change  Neurological: Negative for light-headedness  All other systems reviewed and are negative  Physical Exam  Physical Exam  Vitals and nursing note reviewed  Constitutional:       General: She is not in acute distress  Appearance: She is well-developed  She is obese  HENT:      Head: Normocephalic and atraumatic  Eyes:      Conjunctiva/sclera: Conjunctivae normal    Cardiovascular:      Rate and Rhythm: Normal rate and regular rhythm  Pulses: Normal pulses  Heart sounds: Normal heart sounds  No murmur heard  Pulmonary:      Effort: Pulmonary effort is normal  No tachypnea or respiratory distress  Breath sounds: Examination of the right-lower field reveals wheezing and rales  Examination of the left-lower field reveals wheezing and rales  Wheezing and rales present  Chest:      Chest wall: No tenderness  Abdominal:      Palpations: Abdomen is soft  Tenderness: There is no abdominal tenderness  Musculoskeletal:         General: No swelling  Cervical back: Neck supple  Right lower leg: No tenderness  Edema present  Left lower leg: No tenderness  Edema present  Skin:     General: Skin is warm and dry  Capillary Refill: Capillary refill takes less than 2 seconds  Neurological:      General: No focal deficit present  Mental Status: She is alert and oriented to person, place, and time  Cranial Nerves: No cranial nerve deficit  Motor: No weakness     Psychiatric:         Mood and Affect: Mood normal          Vital Signs  ED Triage Vitals [03/25/23 2229]   Temperature Pulse Respirations Blood Pressure SpO2   97 8 °F (36 6 °C) 90 (!) 24 (!) 201/89 (!) 87 %      Temp Source Heart Rate Source Patient Position - Orthostatic VS BP Location FiO2 (%)   Tympanic Monitor Sitting Left arm --      Pain Score       --           Vitals:    03/25/23 2229 03/25/23 2256   BP: (!) 201/89 (!) 198/77   Pulse: 90 76   Patient Position - Orthostatic VS: Sitting Sitting         Visual Acuity      ED Medications  Medications - No data to display    Diagnostic Studies  Results Reviewed     Procedure Component Value Units Date/Time    CBC and differential [[id number]]  (Abnormal) Collected: 03/25/23 2254    Lab Status: Final result Specimen: Blood from Arm, Right Updated: 03/25/23 2310     WBC 8 35 Thousand/uL      RBC 3 87 Million/uL      Hemoglobin 11 6 g/dL      Hematocrit 36 7 %      MCV 95 fL      MCH 30 0 pg      MCHC 31 6 g/dL      RDW 14 4 %      MPV 10 5 fL      Platelets 817 Thousands/uL      nRBC 0 /100 WBCs      Neutrophils Relative 51 %      Immat GRANS % 0 %      Lymphocytes Relative 27 %      Monocytes Relative 9 %      Eosinophils Relative 12 %      Basophils Relative 1 %      Neutrophils Absolute 4 26 Thousands/µL      Immature Grans Absolute 0 02 Thousand/uL      Lymphocytes Absolute 2 23 Thousands/µL      Monocytes Absolute 0 78 Thousand/µL      Eosinophils Absolute 0 97 Thousand/µL      Basophils Absolute 0 09 Thousands/µL     HS Troponin 0hr (reflex protocol) [[id number]] Collected: 03/25/23 2254    Lab Status: In process Specimen: Blood from Arm, Right Updated: 03/25/23 2307    B-Type Natriuretic Peptide(BNP) [[id number]] Collected: 03/25/23 2254    Lab Status: In process Specimen: Blood from Arm, Right Updated: 03/25/23 2307    Comprehensive metabolic panel [[id number]] Collected: 03/25/23 2254    Lab Status:  In process Specimen: Blood from Arm, Right Updated: 03/25/23 2307                 XR chest 1 view portable    (Results Pending)              Procedures  Procedures         ED Course                               SBIRT 20yo+    Flowsheet Row Most Recent Value   SBIRT (23 yo +)    In order to provide better care to our patients, we are screening all of our patients for alcohol and drug use  Would it be okay to ask you these screening questions? Yes Filed at: 03/25/2023 2254   Initial Alcohol Screen: US AUDIT-C     1  How often do you have a drink containing alcohol? 0 Filed at: 03/25/2023 2254   2  How many drinks containing alcohol do you have on a typical day you are drinking? 0 Filed at: 03/25/2023 2254   3a  Male UNDER 65: How often do you have five or more drinks on one occasion? 0 Filed at: 03/25/2023 2254   3b  FEMALE Any Age, or MALE 65+: How often do you have 4 or more drinks on one occassion? 0 Filed at: 03/25/2023 2254   Audit-C Score 0 Filed at: 03/25/2023 2254   AYDIN: How many times in the past year have you    Used an illegal drug or used a prescription medication for non-medical reasons? Never Filed at: 03/25/2023 2254                    Medical Decision Making  74-year-old female presents emergency department cute onset of dyspnea on exertion and shortness of breath  Differential diagnosis includes but is not limited to CHF, CHF PD exacerbation, pneumonia, pleural effusion, ACS/MI, cardiac dysrhythmia, electrolyte abnormalities, anemia  Labs and imaging reviewed  Patient has a heart score of 6  She has no acute changes on her EKG  Her oxygen levels are stable on her to home 2 L  Because of her heart score and her cardiopulmonary disease history and CAD risk factors patient will be admitted to the hospitalist service for observation, serial troponins and likely cardiac evaluation  Report given to admitting provider  Chest tightness: acute illness or injury  Dyspnea, unspecified type: acute illness or injury  SOB (shortness of breath): acute illness or injury  Amount and/or Complexity of Data Reviewed  Labs: ordered  Radiology: ordered  ECG/medicine tests: ordered and independent interpretation performed       Details: Normal sinus rhythm at a rate of 74 bpm  Normal axis and intervals with no acute ectopy  Risk  Decision regarding hospitalization  Disposition  Final diagnoses:   None     ED Disposition     None      Follow-up Information    None         Patient's Medications   Discharge Prescriptions    No medications on file       No discharge procedures on file      PDMP Review       Value Time User    PDMP Reviewed  Yes 12/27/2022 12:15 PM Daniele Addy          ED Provider  Electronically Signed by           Maria C Mart MD  03/26/23 0099

## 2023-03-27 ENCOUNTER — APPOINTMENT (INPATIENT)
Dept: NON INVASIVE DIAGNOSTICS | Facility: HOSPITAL | Age: 73
End: 2023-03-27

## 2023-03-27 LAB
ANION GAP SERPL CALCULATED.3IONS-SCNC: 6 MMOL/L (ref 4–13)
AORTIC ROOT: 3.7 CM
APICAL FOUR CHAMBER EJECTION FRACTION: 44 %
ASCENDING AORTA: 3.5 CM
ATRIAL RATE: 74 BPM
ATRIAL RATE: 74 BPM
ATRIAL RATE: 78 BPM
AV LVOT PEAK GRADIENT: 4 MMHG
AV PEAK GRADIENT: 8 MMHG
BUN SERPL-MCNC: 47 MG/DL (ref 5–25)
CALCIUM SERPL-MCNC: 8.3 MG/DL (ref 8.4–10.2)
CHLORIDE SERPL-SCNC: 103 MMOL/L (ref 96–108)
CO2 SERPL-SCNC: 32 MMOL/L (ref 21–32)
CREAT SERPL-MCNC: 1.47 MG/DL (ref 0.6–1.3)
DOP CALC LVOT AREA: 3.14 CM2
DOP CALC LVOT DIAMETER: 2 CM
E WAVE DECELERATION TIME: 164 MS
ERYTHROCYTE [DISTWIDTH] IN BLOOD BY AUTOMATED COUNT: 14.2 % (ref 11.6–15.1)
FRACTIONAL SHORTENING: 30 (ref 28–44)
GFR SERPL CREATININE-BSD FRML MDRD: 35 ML/MIN/1.73SQ M
GLUCOSE SERPL-MCNC: 161 MG/DL (ref 65–140)
GLUCOSE SERPL-MCNC: 173 MG/DL (ref 65–140)
GLUCOSE SERPL-MCNC: 200 MG/DL (ref 65–140)
GLUCOSE SERPL-MCNC: 213 MG/DL (ref 65–140)
GLUCOSE SERPL-MCNC: 329 MG/DL (ref 65–140)
HCT VFR BLD AUTO: 33.9 % (ref 34.8–46.1)
HGB BLD-MCNC: 10.6 G/DL (ref 11.5–15.4)
INTERVENTRICULAR SEPTUM IN DIASTOLE (PARASTERNAL SHORT AXIS VIEW): 1 CM
INTERVENTRICULAR SEPTUM: 1 CM (ref 0.6–1.1)
LAAS-AP2: 12.6 CM2
LAAS-AP4: 18.6 CM2
LEFT ATRIUM AREA SYSTOLE SINGLE PLANE A4C: 16.8 CM2
LEFT ATRIUM SIZE: 3.2 CM
LEFT INTERNAL DIMENSION IN SYSTOLE: 3.3 CM (ref 2.1–4)
LEFT VENTRICULAR INTERNAL DIMENSION IN DIASTOLE: 4.7 CM (ref 3.5–6)
LEFT VENTRICULAR POSTERIOR WALL IN END DIASTOLE: 1 CM
LEFT VENTRICULAR STROKE VOLUME: 57 ML
LVSV (TEICH): 57 ML
MCH RBC QN AUTO: 29.7 PG (ref 26.8–34.3)
MCHC RBC AUTO-ENTMCNC: 31.3 G/DL (ref 31.4–37.4)
MCV RBC AUTO: 95 FL (ref 82–98)
MV E'TISSUE VEL-LAT: 11 CM/S
MV E'TISSUE VEL-SEP: 5 CM/S
MV PEAK A VEL: 1.14 M/S
MV PEAK E VEL: 104 CM/S
MV STENOSIS PRESSURE HALF TIME: 47 MS
MV VALVE AREA P 1/2 METHOD: 4.68
P AXIS: 49 DEGREES
P AXIS: 52 DEGREES
P AXIS: 54 DEGREES
PLATELET # BLD AUTO: 180 THOUSANDS/UL (ref 149–390)
PMV BLD AUTO: 11.2 FL (ref 8.9–12.7)
POTASSIUM SERPL-SCNC: 5 MMOL/L (ref 3.5–5.3)
PR INTERVAL: 202 MS
PROCALCITONIN SERPL-MCNC: 0.05 NG/ML
QRS AXIS: -27 DEGREES
QRS AXIS: -31 DEGREES
QRS AXIS: -32 DEGREES
QRSD INTERVAL: 80 MS
QRSD INTERVAL: 82 MS
QRSD INTERVAL: 84 MS
QT INTERVAL: 404 MS
QT INTERVAL: 416 MS
QT INTERVAL: 420 MS
QTC INTERVAL: 448 MS
QTC INTERVAL: 461 MS
QTC INTERVAL: 478 MS
RBC # BLD AUTO: 3.57 MILLION/UL (ref 3.81–5.12)
RIGHT ATRIUM AREA SYSTOLE A4C: 14.7 CM2
RIGHT VENTRICLE ID DIMENSION: 3.6 CM
SL CV LEFT ATRIUM LENGTH A2C: 3.9 CM
SL CV PED ECHO LEFT VENTRICLE DIASTOLIC VOLUME (MOD BIPLANE) 2D: 101 ML
SL CV PED ECHO LEFT VENTRICLE SYSTOLIC VOLUME (MOD BIPLANE) 2D: 44 ML
SODIUM SERPL-SCNC: 141 MMOL/L (ref 135–147)
T WAVE AXIS: 29 DEGREES
T WAVE AXIS: 30 DEGREES
T WAVE AXIS: 40 DEGREES
TR MAX PG: 36 MMHG
TR PEAK VELOCITY: 3 M/S
TRICUSPID ANNULAR PLANE SYSTOLIC EXCURSION: 2 CM
TRICUSPID VALVE PEAK REGURGITATION VELOCITY: 2.99 M/S
VENTRICULAR RATE: 74 BPM
VENTRICULAR RATE: 74 BPM
VENTRICULAR RATE: 78 BPM
WBC # BLD AUTO: 8.54 THOUSAND/UL (ref 4.31–10.16)

## 2023-03-27 RX ORDER — METHYLPREDNISOLONE SODIUM SUCCINATE 40 MG/ML
40 INJECTION, POWDER, LYOPHILIZED, FOR SOLUTION INTRAMUSCULAR; INTRAVENOUS EVERY 12 HOURS SCHEDULED
Status: COMPLETED | OUTPATIENT
Start: 2023-03-28 | End: 2023-03-28

## 2023-03-27 RX ORDER — PREDNISONE 20 MG/1
40 TABLET ORAL DAILY
Status: DISCONTINUED | OUTPATIENT
Start: 2023-03-28 | End: 2023-03-28 | Stop reason: HOSPADM

## 2023-03-27 RX ORDER — METHYLPREDNISOLONE SODIUM SUCCINATE 40 MG/ML
40 INJECTION, POWDER, LYOPHILIZED, FOR SOLUTION INTRAMUSCULAR; INTRAVENOUS EVERY 12 HOURS SCHEDULED
Status: DISCONTINUED | OUTPATIENT
Start: 2023-03-27 | End: 2023-03-27 | Stop reason: SDUPTHER

## 2023-03-27 RX ORDER — BLOOD SUGAR DIAGNOSTIC
STRIP MISCELLANEOUS
Qty: 400 STRIP | Refills: 1 | Status: SHIPPED | OUTPATIENT
Start: 2023-03-27 | End: 2023-03-29 | Stop reason: SDUPTHER

## 2023-03-27 RX ADMIN — METOPROLOL SUCCINATE 50 MG: 50 TABLET, EXTENDED RELEASE ORAL at 09:23

## 2023-03-27 RX ADMIN — NYSTATIN: 100000 POWDER TOPICAL at 18:58

## 2023-03-27 RX ADMIN — MONTELUKAST 10 MG: 10 TABLET, FILM COATED ORAL at 21:16

## 2023-03-27 RX ADMIN — IPRATROPIUM BROMIDE AND ALBUTEROL SULFATE 3 ML: 2.5; .5 SOLUTION RESPIRATORY (INHALATION) at 14:28

## 2023-03-27 RX ADMIN — INSULIN LISPRO 10 UNITS: 100 INJECTION, SOLUTION INTRAVENOUS; SUBCUTANEOUS at 11:56

## 2023-03-27 RX ADMIN — INSULIN LISPRO 2 UNITS: 100 INJECTION, SOLUTION INTRAVENOUS; SUBCUTANEOUS at 11:56

## 2023-03-27 RX ADMIN — PREDNISONE 40 MG: 20 TABLET ORAL at 09:23

## 2023-03-27 RX ADMIN — PRAVASTATIN SODIUM 80 MG: 80 TABLET ORAL at 16:17

## 2023-03-27 RX ADMIN — INSULIN LISPRO 1 UNITS: 100 INJECTION, SOLUTION INTRAVENOUS; SUBCUTANEOUS at 08:05

## 2023-03-27 RX ADMIN — METHYLPREDNISOLONE SODIUM SUCCINATE 40 MG: 40 INJECTION, POWDER, FOR SOLUTION INTRAMUSCULAR; INTRAVENOUS at 12:43

## 2023-03-27 RX ADMIN — PREGABALIN 100 MG: 100 CAPSULE ORAL at 18:57

## 2023-03-27 RX ADMIN — APIXABAN 5 MG: 5 TABLET, FILM COATED ORAL at 18:57

## 2023-03-27 RX ADMIN — NYSTATIN: 100000 POWDER TOPICAL at 09:24

## 2023-03-27 RX ADMIN — TORSEMIDE 20 MG: 20 TABLET ORAL at 09:23

## 2023-03-27 RX ADMIN — INSULIN LISPRO 2 UNITS: 100 INJECTION, SOLUTION INTRAVENOUS; SUBCUTANEOUS at 16:18

## 2023-03-27 RX ADMIN — INSULIN LISPRO 10 UNITS: 100 INJECTION, SOLUTION INTRAVENOUS; SUBCUTANEOUS at 16:18

## 2023-03-27 RX ADMIN — INSULIN GLARGINE 25 UNITS: 100 INJECTION, SOLUTION SUBCUTANEOUS at 09:22

## 2023-03-27 RX ADMIN — INSULIN LISPRO 3 UNITS: 100 INJECTION, SOLUTION INTRAVENOUS; SUBCUTANEOUS at 21:15

## 2023-03-27 RX ADMIN — PERFLUTREN 0.8 ML/MIN: 6.52 INJECTION, SUSPENSION INTRAVENOUS at 11:35

## 2023-03-27 RX ADMIN — DOCUSATE SODIUM 100 MG: 100 CAPSULE, LIQUID FILLED ORAL at 09:23

## 2023-03-27 RX ADMIN — IPRATROPIUM BROMIDE AND ALBUTEROL SULFATE 3 ML: 2.5; .5 SOLUTION RESPIRATORY (INHALATION) at 19:22

## 2023-03-27 RX ADMIN — INSULIN LISPRO 10 UNITS: 100 INJECTION, SOLUTION INTRAVENOUS; SUBCUTANEOUS at 08:05

## 2023-03-27 RX ADMIN — INSULIN GLARGINE 25 UNITS: 100 INJECTION, SOLUTION SUBCUTANEOUS at 21:15

## 2023-03-27 RX ADMIN — SODIUM CHLORIDE 50 ML/HR: 0.9 INJECTION, SOLUTION INTRAVENOUS at 00:20

## 2023-03-27 RX ADMIN — Medication: at 09:24

## 2023-03-27 RX ADMIN — FLUTICASONE FUROATE AND VILANTEROL TRIFENATATE 1 PUFF: 200; 25 POWDER RESPIRATORY (INHALATION) at 09:24

## 2023-03-27 RX ADMIN — FLUTICASONE PROPIONATE 1 SPRAY: 50 SPRAY, METERED NASAL at 09:24

## 2023-03-27 RX ADMIN — IPRATROPIUM BROMIDE AND ALBUTEROL SULFATE 3 ML: 2.5; .5 SOLUTION RESPIRATORY (INHALATION) at 07:36

## 2023-03-27 RX ADMIN — Medication: at 18:58

## 2023-03-27 RX ADMIN — PREGABALIN 100 MG: 100 CAPSULE ORAL at 09:22

## 2023-03-27 RX ADMIN — APIXABAN 5 MG: 5 TABLET, FILM COATED ORAL at 09:22

## 2023-03-27 NOTE — ASSESSMENT & PLAN NOTE
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

## 2023-03-27 NOTE — PROGRESS NOTES
"Progress Note - Pulmonary   Morganeasay Ax 81 y o  female MRN: [id number]  Unit/Bed#: [address] [place] [address] Encounter: [id number]    Assessment/Plan:    Acute on chronic hypoxic hypercapnic respiratory failure due to obesity hypoventilation and obstructive sleep apnea in the setting of morbid obesity   Baseline oxygen requirement is 2L NC at HS only  She has Bipap, but does not wear it  Outpatient split night sleep study as previously ordered  Check AM ABG tomorrow on 2L NC  Titrate oxygen to maintain POX > or = 89%  Ambulatory POX prior to D/C  Reactive airways disease with acute exacerbation   Continue with DuoNebs 4 times daily and Breo 200 daily with Singulair at bedtime  Continue Solumedrol 40 mg BID today and transition to prednisone 40 mg daily tomorrow with taper by 10 mg q2days as tolerated  Abnormal CT chest with pulmonary nodules   Pulmonary nodules are stable for 1 year  Repeat CT of the chest in March 2024  Outpatient follow-up pending course  Discussed with primary team     Chief Complaint:    \"I feel much better  \"    Subjective:    Danny Hendricks is sitting OOB in the chair  She reports she feels much better since admission  No wheezing  Cough is improved  No SOB at rest  She feels she cannot wear Bipap here due to acute illness  She has not been wearing it at home either  Objective:    Vitals: Blood pressure 144/76, pulse 86, temperature 97 9 °F (36 6 °C), temperature source Oral, resp  rate 18, height 5' 2\" (1 575 m), weight 118 kg (260 lb), SpO2 96 %, not currently breastfeeding  2L NC,Body mass index is 47 55 kg/m²  Intake/Output Summary (Last 24 hours) at 3/27/2023 1355  Last data filed at 3/27/2023 1248  Gross per 24 hour   Intake --   Output 700 ml   Net -700 ml       Invasive Devices     Peripheral Intravenous Line  Duration           Peripheral IV 03/26/23 Proximal;Right;Ventral (anterior) Forearm 1 day                Physical Exam:     Physical Exam  Vitals reviewed   " Constitutional:       General: She is not in acute distress  Appearance: She is well-developed  She is morbidly obese  She is not toxic-appearing or diaphoretic  Interventions: Nasal cannula in place  HENT:      Head: Normocephalic and atraumatic  Eyes:      General: No scleral icterus  Neck:      Trachea: No tracheal deviation  Cardiovascular:      Rate and Rhythm: Normal rate and regular rhythm  Heart sounds: S1 normal and S2 normal  No murmur heard  No friction rub  No gallop  Pulmonary:      Effort: Pulmonary effort is normal  No tachypnea, accessory muscle usage or respiratory distress  Breath sounds: Normal breath sounds  No stridor  No decreased breath sounds, wheezing, rhonchi or rales  Chest:      Chest wall: No tenderness  Abdominal:      General: Bowel sounds are normal  There is no distension  Palpations: Abdomen is soft  Tenderness: There is no abdominal tenderness  Musculoskeletal:      Cervical back: Neck supple  Right lower leg: No edema  Left lower leg: No edema  Skin:     General: Skin is warm and dry  Findings: No rash  Neurological:      Mental Status: She is alert and oriented to person, place, and time  GCS: GCS eye subscore is 4  GCS verbal subscore is 5  GCS motor subscore is 6  Psychiatric:         Speech: Speech normal          Behavior: Behavior normal  Behavior is cooperative         Labs:   CBC:   Lab Results   Component Value Date    WBC 8 54 03/27/2023    HGB 10 6 (L) 03/27/2023    HCT 33 9 (L) 03/27/2023    MCV 95 03/27/2023     03/27/2023    MCH 29 7 03/27/2023    MCHC 31 3 (L) 03/27/2023    RDW 14 2 03/27/2023    MPV 11 2 03/27/2023   , CMP:   Lab Results   Component Value Date    SODIUM 141 03/27/2023    K 5 0 03/27/2023     03/27/2023    CO2 32 03/27/2023    BUN 47 (H) 03/27/2023    CREATININE 1 47 (H) 03/27/2023    CALCIUM 8 3 (L) 03/27/2023    EGFR 35 03/27/2023     Procalcitonin 0 05    Sputum culture pending    BNP 43    Imaging and other studies: I have personally reviewed pertinent reports  Echocardiogram shows EF 50 to 55% with normal diastolic function  RV size and function normal    CT chest shows multiple pulmonary nodules unchanged since March 9, 2022  No acute infiltrates

## 2023-03-27 NOTE — PHYSICAL THERAPY NOTE
PHYSICAL THERAPY EVALUATION/TREATMENT     03/27/23 5364   Note Type   Note type Evaluation   Pain Assessment   Pain Assessment Tool 0-10   Pain Score No Pain   Restrictions/Precautions   Other Precautions Fall Risk;O2   Home Living   Type of Home Mobile home   Home Layout One level  (3 JESS with rail)   Home Equipment Walker;Cane  (31)   Prior Function   Level of [place] Independent with ADLs; Independent with functional mobility   L[facility] With [facility] in the last 6 months 1 to 4   General   Additional Pertinent History Pt admitted with progressive dyspnea over the past 2 weeks with diagnosis of acute on chronic hypoxic hypercapnic respiratory failure due to obesity hypoventilation and obstructive sleep apnea  Pt reports she feels better since hospital admission  Cognition   Overall Cognitive Status WFL   Arousal/Participation Alert   Orientation Level Oriented X4   Following Commands Follows all commands and directions without difficulty   Subjective   Subjective 'I've been walking to the bathroom but I'm still SOB even at rest with the oxygen  RLE Assessment   RLE Assessment   (ROM WFL, MMT 4-/5)   LLE Assessment   LLE Assessment   (ROM WFL, MMT 4-/5)   Transfers   Sit to Stand 5  Supervision   Stand to Sit 5  Supervision   Stand pivot 5  Supervision   Additional items Increased time required   Ambulation/Elevation   Gait pattern Wide ONUR  (guarded, slow kameron, reaching for rails in the rouse)   Gait Assistance   (supervision/min assist)   Assistive Device   (8L52)   Distance 75 feet   Balance   Static Sitting Good   Dynamic Sitting Fair +   Static Standing Fair   Dynamic Standing Fair   Ambulatory Fair -   Activity Tolerance   Activity Tolerance Patient limited by fatigue;Patient tolerated treatment well   Assessment   Prognosis Good   Problem List Decreased strength;Decreased endurance;Decreased mobility; Impaired balance   Assessment Patient is an 81y o  year old "female seen for Physical Therapy evaluation  Patient admitted with Dyspnea  Comorbidities affecting patient's physical performance include: morbid obesity, DM, afib, anemia, COPD, falls  Personal factors affecting patient at time of initial evaluation include: inability to navigate community distances, positive fall history and inability to live alone  Prior to admission, patient was independent with functional mobility without assistive device and independent with ADLS  Please find objective findings from Physical Therapy assessment regarding body systems outlined above with impairments and limitations including weakness, impaired balance, decreased endurance, decreased activity tolerance, decreased functional mobility tolerance, fall risk and SOB upon exertion  The Barthel Index was used as a functional outcome tool presenting with a score of Barthel Index Score: 65 today indicating moderate limitations of functional mobility and ADLS  Patient's clinical presentation is currently unstable/unpredictable as seen in patient's presentation of increased fall risk, new onset of impairment of functional mobility, decreased endurance and new onset of weakness  Pt would benefit from continued Physical Therapy treatment to address deficits as defined above and maximize level of functional mobility  As demonstrated by objective findings, the assigned level of complexity for this evaluation is high  The patient's AM-[facility] Basic Mobility Inpatient Short Form Raw Score is 17  A Raw score of greater than 16 suggests the patient may benefit from discharge to home     Goals   Patient Goals \"be able to walk on my own without SOB\"   STG Expiration Date 04/03/23   Short Term Goal #1 independent bed mobility, independent transfers, independent ambulation indoor level surfaces with a walker without SOB x 75 feet   LTG Expiration Date 04/10/23   Long Term Goal #1 independent up and down 3 steps with a rail without SOB, independent " "ambulation outdoor surfaces 150 feet with least restrictive device without SOB and stable Sp02   Plan   Treatment/Interventions Functional transfer training;LE strengthening/ROM; Elevations; Therapeutic exercise; Endurance training;Gait training;Bed mobility; Patient/family training;Equipment eval/education   PT Frequency Other (Comment)  (5x/week)   Recommendation   PT Discharge Recommendation Home with home health rehabilitation   Equipment Recommended   (Pt has a walker, cane and 02)   AM-PAC Basic Mobility Inpatient   Turning in Flat Bed Without Bedrails 3   Lying on Back to Sitting on Edge of Flat Bed Without Bedrails 3   Moving Bed to Chair 3   Standing Up From Chair Using Arms 3   Walk in Room 3   Climb 3-5 Stairs With Railing 2   Basic Mobility Inpatient Raw Score 17   Basic Mobility Standardized Score 39 67   Highest Level Of Mobility   -[facility] Goal 5: Stand one or more mins   -HL Achieved 7: Walk 25 feet or more   Barthel Index   Feeding 10   Bathing 0   Grooming Score 5   Dressing Score 5   Bladder Score 5   Bowels Score 10   Toilet Use Score 5   Transfers (Bed/Chair) Score 10   Mobility (Level Surface) Score 10   Stairs Score 5   Barthel Index Score 65   Additional Treatment Session   Start Time 1535   End Time 1550   Treatment Assessment S:  \"I think I might use the walker\" O:  Pt ambulated an additional 75 feet with and without a walker with 2L02  Sp02 >95% with activity however pt does get mildly SOB  A:  Pt's gait is steadier with use of a walker, pt agreed to use it when walking for now  P:  Continue PT to improve endurance and independence with function  Encourage OOB and ambulation room with staff  End of Consult   Patient Position at End of Consult All needs within reach; Bedside chair   Licensure   [facility] License Number  Erin Turk PT  [id number]     "

## 2023-03-27 NOTE — WOUND OSTOMY CARE
Progress Note - Wound   Estrada Sep 81 y o  female MRN: [id number]  Unit/Bed#: [address] [facility] 408-01 Encounter: [id number]      Assessment: This is a 81year old female patient admitted on 3/25/23 with dyspnea  She has a history of NIDDM 2, morbid obesity, obstructive sleep apnea, bilateral pulmonary nodules, right breast malignant calcification, HTN, and diabetic wound to right heel  She was sitting in reclining chair awake, alert & oriented x 3 in no distress  She states that she sits in chair at home for most of the day  She has some episodes of urinary incontinence, is continent of stool and ambulates to bathroom with assist      Assessment Findings:  1-Tan, dry eschar with no drainage to right heel - orders in place for protection  2-Blanchable erythema to bilateral sacrobuttock area with scattered dry scabs - orders in place for skin care and for prevention  Plan:   Skin care plans:  1-Hydraguard to bilateral sacrum, buttock and heels BID and PRN  2-Float heels on 2 pillows to offload pressure when patient in bed or when out of bed to reclining chair  3-Ehob cushion in chair when out of bed - limit sitting to maximum of 2 hrs for meals then remind/assist patient to return back to bed to offload sacrobuttock area for 2 hours  4-Moisturize skin daily with skin nourishing cream   5-Turn/reposition q2h or when medically stable for pressure re-distribution on skin  Wound 01/22/23 Diabetic Ulcer Heel Right (Active)   Wound Image   03/27/23 0929   Wound Description Eschar; Macias 03/27/23 0929   Wound Length (cm) 0 4 cm 03/27/23 0929   Wound Width (cm) 0 5 cm 03/27/23 0929   Wound Depth (cm) 0 cm 03/27/23 0929   Wound Surface Area (cm^2) 0 2 cm^2 03/27/23 0929   Wound Volume (cm^3) 0 cm^3 03/27/23 0929   Calculated Wound Volume (cm^3) 0 cm^3 03/27/23 0929   Drainage Amount None 03/27/23 0929   Non-staged Wound Description Full thickness 03/27/23 0929   Treatments Cleansed 03/27/23 0929   Dressing Protective barrier 03/27/23 0929   Wound packed? No 03/27/23 0929   Dressing Changed New 03/27/23 0929   Dressing Status Intact 03/27/23 0929       Wound 01/24/23 Pressure Injury Buttocks (Active)   Wound Image  Blanchable erythema measures 8x4 cm to each buttock 03/27/23 0935   Pressure Injury Stage Please do not stage 03/27/23 0935   Marlene-wound Assessment Blanchable erythema 03/27/23 0935   Drainage Amount None 03/27/23 0935   Treatments Cleansed 03/27/23 0935   Dressing Protective barrier 03/27/23 0935   Dressing Changed New 03/27/23 0935   Dressing Status Intact 03/27/23 0935     Discussed assessment findings, and plan of care/recommendations with Emily Grade  Wound care will follow along with patient weekly, please call or tiger text with questions and concerns  Recommendations written as orders    Sherrill Cervantes RN, MSN, [employer]

## 2023-03-27 NOTE — PROGRESS NOTES
[facility] Progress Note  Patient: Zuleyma Gallegos 81 y o  female   MRN: [id number]  PCP: Magno Cha MD  Unit/Bed#: [address] [place] [address] Encounter: [id number]  Date Of Visit: 03/27/23    Problem List:    Principal Problem:    Reactive airway disease with acute exacerbation  Active Problems:    Acute on chronic respiratory failure with hypoxia and hypercapnia (HCC)    Chronic diastolic heart failure (HCC)    Multiple pulmonary nodules determined by computed tomography of lung    Type 2 diabetes mellitus with hyperglycemia, without long-term current use of insulin (HCC)    [facility] ([facility] )    Malignant neoplasm of overlapping sites of right female breast ([facility] )    Morbid obesity with BMI of 45 0-49 9, adult ([place] [address] )    Mixed hyperlipidemia    Essential hypertension    Gastroesophageal reflux disease    Status post reverse total replacement of left shoulder    Obstructive sleep apnea    S/P total knee replacement, right      Assessment & Plan:    Acute on chronic respiratory failure with hypoxia and hypercapnia (HCC)  Assessment & Plan  Secondary to obesity hypoventilation/KATRINA, restrictive lung disease  Noncompliant with BiPAP, using nightly at baseline  Currently requiring 2 L of supplemental oxygen continuously  Pulmonary following, input appreciated  · Continue supplemental oxygen, titrate as tolerated  · ABG in a m  · Ambulatory pulse ox reevaluation on discharge  · Outpatient sleep nighttime sleep study as already advised    * Reactive airway disease with acute exacerbation  Assessment & Plan  Presented with 2 weeks of worsening of shortness of breath and cough and continue to worsen in spite of outpatient rx including abx  Diffuse wheezing noted associated with cough  CT chest without any acute abnormality  No evidence of pneumonia  Afebrile  Normal white count  Procalcitonin negative    Improving still with wheezing  · Continue bronchodilators  · IV Solu-Medrol 40 mg every 12 hours  · Continue Breo, Singulair  · Pulmonary follow-up  · Likely transition to prednisone in a m  Malignant neoplasm of overlapping sites of right female breast [facility])  Assessment & Plan  S/p biopsy of right breast calcification-noted to have Fragments of Atypical intraductal papillary proliferation with central hyalinized stromal core and associated calcification  - has not yet established care with oncologist or surgeon, awaiting follow-up  - Known bilateral pulmonary nodules unclear significance being monitored   - CT CAP without any evidence of malignancy  · Seen by oncology, input appreciated  Outpatient follow-up    Atrial fibrillation [facility])  Assessment & Plan  On metoprolol and eliquis - continued  Tele monitoring-no evidence of arrhythmia    Discontinued    Type 2 diabetes mellitus with hyperglycemia, without long-term current use of insulin [facility])  Assessment & Plan  Lab Results   Component Value Date    HGBA1C 8 1 (H) 03/25/2023       Recent Labs     03/26/23  1149 03/26/23  1551 03/26/23  2031 03/27/23  0715   POCGLU 148* 137 225* 161*       Blood Sugar Average: Last 72 hrs:  (P) 159 8       - on ozempic to help with weight loss  - ozempic held while inpatient   - continue with insulin, insulin sliding scale and carb restricted ADA diet     Multiple pulmonary nodules determined by computed tomography of lung  Assessment & Plan  - being followed up outpatient   - no bx done since as per patient too small to characterize     Chronic diastolic heart failure (HCC)  Assessment & Plan  Wt Readings from Last 3 Encounters:   03/27/23 118 kg (260 lb)   03/24/23 117 kg (258 lb)   03/23/23 117 kg (258 lb)     Appears compensated  Continue current meds        Obstructive sleep apnea  Assessment & Plan  Noncompliant with BiPAP  - uses 2 liters oxygen for sleeping   - now requiring oxygen even at daytime and even on rest   Pulmonary following, input appreciated  Check ABG in a m  on 2 L of supplemental oxygen    Gastroesophageal reflux disease  Assessment & Plan  - on Protonix     Essential hypertension  Assessment & Plan  On demadex and toprol - continue     Mixed hyperlipidemia  Assessment & Plan  On crestor - changed to pravachol while inpatient     Morbid obesity with BMI of 45 0-49 9, adult ([facility] )  Assessment & Plan  - counseled weight loss by admitting physician  - hypothyroidism on synthroid - check levels of TSH      S/P total knee replacement, right  Assessment & Plan  PT/OT          VTE Pharmacologic Prophylaxis: VTE Score: 12 Moderate Risk (Score 3-4) - Pharmacological DVT Prophylaxis Ordered: apixaban (Eliquis)  Patient Centered Rounds: I performed bedside rounds with nursing staff today  Discussions with Specialists or Other Care Team Provider: yes, pulmonary    Education and Discussions with Family / Patient: yes  Fistula follow-up    Time Spent for Care: 45 minutes  More than 50% of total time spent on counseling and coordination of care as described above  Current Length of Stay: 1 day(s)  Current Patient Status: Inpatient   Certification Statement: The patient will continue to require additional inpatient hospital stay due to  exacerbation of reactive airway disease  Discharge Plan: Anticipate discharge in 24-48 hrs to home with home services  Code Status: Level 1 - Full Code    Subjective:   Still with wheezing but reports symptomatic improvement in breathing and cough  Denies any chest pain    Still requiring oxygen during the daytime at rest      Objective:     Vitals:   Temp (24hrs), Av 8 °F (36 6 °C), Min:97 1 °F (36 2 °C), Max:98 2 °F (36 8 °C)    Temp:  [97 1 °F (36 2 °C)-98 2 °F (36 8 °C)] 97 9 °F (36 6 °C)  HR:  [] 82  Resp:  [17-18] 18  BP: (137-171)/(64-93) 144/76  SpO2:  [94 %-100 %] 96 %  Body mass index is 47 66 kg/m²  Input and Output Summary (last 24 hours):      Intake/Output Summary (Last 24 hours) at 3/27/2023 4650  Last data filed at 3/26/2023 0150  Gross per 24 hour   Intake 120 ml   Output --   Net 120 ml       Physical Exam:   Physical Exam  Constitutional:       General: She is not in acute distress  Appearance: She is obese  HENT:      Head: Normocephalic and atraumatic  Cardiovascular:      Rate and Rhythm: Normal rate  Pulmonary:      Effort: Pulmonary effort is normal  No respiratory distress  Breath sounds: Wheezing present  No rales  Comments: Diminished bilaterally  Abdominal:      General: Bowel sounds are normal  There is no distension  Palpations: Abdomen is soft  Tenderness: There is no abdominal tenderness  There is no guarding or rebound  Musculoskeletal:      Right lower leg: No edema  Left lower leg: No edema  Skin:     General: Skin is warm and dry  Findings: No rash  Neurological:      Mental Status: She is alert           Additional Data:     Labs:  Results from last 7 days   Lab Units 03/27/23  0526 03/26/23  0723   WBC Thousand/uL 8 54 8 02   HEMOGLOBIN g/dL 10 6* 11 1*   HEMATOCRIT % 33 9* 35 4   PLATELETS Thousands/uL 180 169   NEUTROS PCT %  --  50   LYMPHS PCT %  --  23   MONOS PCT %  --  11   EOS PCT %  --  15*     Results from last 7 days   Lab Units 03/27/23  0526 03/26/23  0723   SODIUM mmol/L 141 142   POTASSIUM mmol/L 5 0 4 7   CHLORIDE mmol/L 103 102   CO2 mmol/L 32 34*   BUN mg/dL 47* 47*   CREATININE mg/dL 1 47* 1 61*   ANION GAP mmol/L 6 6   CALCIUM mg/dL 8 3* 8 5   ALBUMIN g/dL  --  3 6   TOTAL BILIRUBIN mg/dL  --  0 27   ALK PHOS U/L  --  51   ALT U/L  --  12   AST U/L  --  15   GLUCOSE RANDOM mg/dL 173* 131     Results from last 7 days   Lab Units 03/26/23  0723   INR  1 21*     Results from last 7 days   Lab Units 03/27/23  0715 03/26/23  2031 03/26/23  1551 03/26/23  1149 03/26/23  0726   POC GLUCOSE mg/dl 161* 225* 137 148* 128     Results from last 7 days   Lab Units 03/25/23  2254 03/23/23  1630   HEMOGLOBIN A1C % 8 1* 8 2*     Results from last 7 days   Lab Units "03/27/23  0526   PROCALCITONIN ng/ml 0 05       Lines/Drains:  Invasive Devices     Peripheral Intravenous Line  Duration           Peripheral IV 03/26/23 Proximal;Right;Ventral (anterior) Forearm 1 day                      Imaging: Reviewed radiology reports from this admission including: chest CT scan and abdominal/pelvic CT    Recent Cultures (last 7 days):         Last 24 Hours Medication List:   Current Facility-Administered Medications   Medication Dose Route Frequency Provider Last Rate   • albuterol  2 puff Inhalation Q6H PRN Heather Huerta MD     • aluminum-magnesium hydroxide-simethicone  30 mL Oral Q6H PRN Heather Huerta MD     • ammonium lactate   Topical BID Heather Huerta MD     • apixaban  5 mg Oral BID Heather Huerta MD     • docusate sodium  100 mg Oral Daily Heather Huerta MD     • fluticasone  1 spray Nasal Daily Heather Huerta MD     • fluticasone-vilanterol  1 puff Inhalation Daily Wyatt Vance MD     • insulin glargine  25 Units Subcutaneous Q12H Loretta Ochoa MD     • insulin lispro  1-5 Units Subcutaneous HS Heather Huerta MD     • insulin lispro  1-6 Units Subcutaneous TID Peter Zazueta MD     • insulin lispro  10 Units Subcutaneous TID With Meals Heather Huerta MD     • ipratropium-albuterol  3 mL Nebulization TID Wyatt Vance MD     • metoprolol succinate  50 mg Oral Daily Heather Huerta MD     • montelukast  10 mg Oral HS Heather Huerta MD     • nystatin   Topical BID Anthony Mishra MD     • ondansetron  4 mg Intravenous Q6H PRN Heather Huerta MD     • pravastatin  80 mg Oral Daily With Marley Mcmanus MD     • predniSONE  40 mg Oral Daily Wyatt Vance MD     • pregabalin  100 mg Oral BID Heather Huerta MD     • semaglutide (1 mg/dose)  1 mg Subcutaneous Q7 Days Heather Huerta MD     • torsemide  20 mg Oral Daily Heather Huerta MD          Today, Patient Was Seen By: Anthony Mishra MD    ** Please Note: \"This note has been constructed using a voice recognition system  Therefore there may be syntax, " "spelling, and/or grammatical errors  Please call if you have any questions   \"**  "

## 2023-03-27 NOTE — ASSESSMENT & PLAN NOTE
Lab Results   Component Value Date    HGBA1C 8 1 (H) 03/25/2023       Recent Labs     03/26/23  1149 03/26/23  1551 03/26/23 2031 03/27/23  0715   POCGLU 148* 137 225* 161*       Blood Sugar Average: Last 72 hrs:  (P) 159 8       - on ozempic to help with weight loss  - ozempic held while inpatient   - continue with insulin, insulin sliding scale and carb restricted ADA diet

## 2023-03-27 NOTE — PLAN OF CARE
Problem: Potential for Falls  Goal: Patient will remain free of falls  Description: INTERVENTIONS:  - Educate patient/family on patient safety including physical limitations  - Instruct patient to call for assistance with activity   - Consult OT/PT to assist with strengthening/mobility   - Keep Call bell within reach  - Keep bed low and locked with side rails adjusted as appropriate  - Keep care items and personal belongings within reach  - Initiate and maintain comfort rounds  - Make Fall Risk Sign visible to staff  - Offer Toileting every 2  Hours, in advance of need  - Initiate/Maintain bed alarm  - Obtain necessary fall risk management equipment: yellow socks  - Apply yellow socks and bracelet for high fall risk patients  - Consider moving patient to room near nurses station  Outcome: Progressing     Problem: Nutrition/Hydration-ADULT  Goal: Nutrient/Hydration intake appropriate for improving, restoring or maintaining nutritional needs  Description: Monitor and assess patient's nutrition/hydration status for malnutrition  Collaborate with interdisciplinary team and initiate plan and interventions as ordered  Monitor patient's weight and dietary intake as ordered or per policy  Utilize nutrition screening tool and intervene as necessary  Determine patient's food preferences and provide high-protein, high-caloric foods as appropriate       INTERVENTIONS:  - Monitor oral intake, urinary output, labs, and treatment plans  - Assess nutrition and hydration status and recommend course of action  - Evaluate amount of meals eaten  - Assist patient with eating if necessary   - Allow adequate time for meals  - Recommend/ encourage appropriate diets, oral nutritional supplements, and vitamin/mineral supplements  - Order, calculate, and assess calorie counts as needed  - Recommend, monitor, and adjust tube feedings and TPN/PPN based on assessed needs  - Assess need for intravenous fluids  - Provide specific nutrition/hydration education as appropriate  - Include patient/family/caregiver in decisions related to nutrition  Outcome: Progressing     Problem: SAFETY ADULT  Goal: Patient will remain free of falls  Description: INTERVENTIONS:  - Educate patient/family on patient safety including physical limitations  - Instruct patient to call for assistance with activity   - Consult OT/PT to assist with strengthening/mobility   - Keep Call bell within reach  - Keep bed low and locked with side rails adjusted as appropriate  - Keep care items and personal belongings within reach  - Initiate and maintain comfort rounds  - Make Fall Risk Sign visible to staff  - Offer Toileting every 2  Hours, in advance of need  - Initiate/Maintain bed alarm  - Obtain necessary fall risk management equipment: yellow socks    - Apply yellow socks and bracelet for high fall risk patients  - Consider moving patient to room near nurses station  Outcome: Progressing  Goal: Maintain or return to baseline ADL function  Description: INTERVENTIONS:  -  Assess patient's ability to carry out ADLs; assess patient's baseline for ADL function and identify physical deficits which impact ability to perform ADLs (bathing, care of mouth/teeth, toileting, grooming, dressing, etc )  - Assess/evaluate cause of self-care deficits   - Assess range of motion  - Assess patient's mobility; develop plan if impaired  - Assess patient's need for assistive devices and provide as appropriate  - Encourage maximum independence but intervene and supervise when necessary  - Involve family in performance of ADLs  - Assess for home care needs following discharge   - Consider OT consult to assist with ADL evaluation and planning for discharge  - Provide patient education as appropriate  Outcome: Progressing  Goal: Maintains/Returns to pre admission functional level  Description: INTERVENTIONS:  - Perform BMAT or MOVE assessment daily    - Set and communicate daily mobility goal to care team and patient/family/caregiver  - Collaborate with rehabilitation services on mobility goals if consulted  - Perform Range of Motion 3 times a day  - Reposition patient every 2 hours    - Ambulate patient 3  times a day  - Out of bed to chair 3 times a day   - Out of bed for meals 3 times a day  - Out of bed for toileting  - Record patient progress and toleration of activity level   Outcome: Progressing     Problem: DISCHARGE PLANNING  Goal: Discharge to home or other facility with appropriate resources  Description: INTERVENTIONS:  - Identify barriers to discharge w/patient and caregiver  - Arrange for needed discharge resources and transportation as appropriate  - Identify discharge learning needs (meds, wound care, etc )  - Arrange for interpretive services to assist at discharge as needed  - Refer to Case Management Department for coordinating discharge planning if the patient needs post-hospital services based on physician/advanced practitioner order or complex needs related to functional status, cognitive ability, or social support system  Outcome: Progressing     Problem: RESPIRATORY - ADULT  Goal: Achieves optimal ventilation and oxygenation  Description: INTERVENTIONS:  - Assess for changes in respiratory status  - Assess for changes in mentation and behavior  - Position to facilitate oxygenation and minimize respiratory effort  - Oxygen administered by appropriate delivery if ordered  - Initiate smoking cessation education as indicated  - Encourage broncho-pulmonary hygiene including cough, deep breathe, Incentive Spirometry  - Assess the need for suctioning and aspirate as needed  - Assess and instruct to report SOB or any respiratory difficulty  - Respiratory Therapy support as indicated  Outcome: Progressing     Problem: SKIN/TISSUE INTEGRITY - ADULT  Goal: Skin Integrity remains intact(Skin Breakdown Prevention)  Description: Assess:  -Perform Hung assessment every shift  -Clean and moisturize skin every shift  -Inspect skin when repositioning, toileting, and assisting with ADLS  -Assess under medical devices such as n/c  every shift  -Assess extremities for adequate circulation and sensation     Bed Management:  -Have minimal linens on bed & keep smooth, unwrinkled  -Change linens as needed when moist or perspiring  -Avoid sitting or lying in one position for more than 2 hours while in bed  -Keep HOB at 45 degrees     Toileting:  -Offer bedside commode  -Assess for incontinence every 2 hours  -Use incontinent care products after each incontinent episode such as barrier cream    Activity:  -Encourage activity and walks on unit  -Encourage or provide ROM exercises   -Turn and reposition patient every 2 Hours  -Use appropriate equipment to lift or move patient in bed  -Instruct/ Assist with weight shifting every 15 minutes  when out of bed in chair  -Consider limitation of chair time 4  hour intervals    Skin Care:  -Avoid use of baby powder, tape, friction and shearing, hot water or constrictive clothing  -Relieve pressure over bony prominences using waffle cushion   -Do not massage red bony areas    Next Steps:  -Consider consults to  interdisciplinary teams such as  wound care, PT/OT  Outcome: Progressing  Goal: Pressure injury heals and does not worsen  Description: Interventions:  - Implement low air loss mattress or specialty surface (Criteria met)  - Apply silicone foam dressing        Outcome: Progressing     Problem: MOBILITY - ADULT  Goal: Maintain or return to baseline ADL function  Description: INTERVENTIONS:  -  Assess patient's ability to carry out ADLs; assess patient's baseline for ADL function and identify physical deficits which impact ability to perform ADLs (bathing, care of mouth/teeth, toileting, grooming, dressing, etc )  - Assess/evaluate cause of self-care deficits   - Assess range of motion  - Assess patient's mobility; develop plan if impaired  - Assess patient's need for assistive devices and provide as appropriate  - Encourage maximum independence but intervene and supervise when necessary  - Involve family in performance of ADLs  - Assess for home care needs following discharge   - Consider OT consult to assist with ADL evaluation and planning for discharge  - Provide patient education as appropriate  Outcome: Progressing  Goal: Maintains/Returns to pre admission functional level  Description: INTERVENTIONS:  - Perform BMAT or MOVE assessment daily    - Set and communicate daily mobility goal to care team and patient/family/caregiver  - Collaborate with rehabilitation services on mobility goals if consulted  - Perform Range of Motion 3 times a day  - Reposition patient every 2 hours  - Ambulate patient 3  times a day  - Out of bed to chair 3 times a day   - Out of bed for meals 3 times a day  - Out of bed for toileting  - Record patient progress and toleration of activity level   Outcome: Progressing     Problem: MOBILITY - ADULT  Goal: Maintain or return to baseline ADL function  Description: INTERVENTIONS:  -  Assess patient's ability to carry out ADLs; assess patient's baseline for ADL function and identify physical deficits which impact ability to perform ADLs (bathing, care of mouth/teeth, toileting, grooming, dressing, etc )  - Assess/evaluate cause of self-care deficits   - Assess range of motion  - Assess patient's mobility; develop plan if impaired  - Assess patient's need for assistive devices and provide as appropriate  - Encourage maximum independence but intervene and supervise when necessary  - Involve family in performance of ADLs  - Assess for home care needs following discharge   - Consider OT consult to assist with ADL evaluation and planning for discharge  - Provide patient education as appropriate  Outcome: Progressing  Goal: Maintains/Returns to pre admission functional level  Description: INTERVENTIONS:  - Perform BMAT or MOVE assessment daily     - Set and communicate daily mobility goal to care team and patient/family/caregiver  - Collaborate with rehabilitation services on mobility goals if consulted  - Perform Range of Motion 3 times a day  - Reposition patient every 2 hours    - Ambulate patient 3  times a day  - Out of bed to chair 3 times a day   - Out of bed for meals 3 times a day  - Out of bed for toileting  - Record patient progress and toleration of activity level   Outcome: Progressing

## 2023-03-27 NOTE — CONSULTS
Ari Colmenares  1950    HEMATOLOGY/ONCOLOGY CONSULTATION REPORT    DISCUSSION/SUMMARY:    53-year-old female admitted with respiratory issues  Work-up/treatment ongoing with hospital service  Recent mammogram demonstrated a right-sided abnormality  Recent biopsy demonstrated atypical cells but no clear diagnosis of noninvasive or invasive cancer or a premalignant pathologic diagnosis  When Mrs Coombs Fears is better and stable and discharged, patient will make an appointment with Dr Ed Harmon, surgical (breast) oncologist at [facility]  Patient will also be given a follow-up oncology appointment eventually  WBC and platelets are okay/acceptable  Hemoglobin level is slightly decreased  No bleeding or excessive bruising  The normocytic anemia is likely associated with the patient's multiple comorbidities; just needs to be watched for the time being  This also can be followed in the outpatient setting  Above was discussed with the patient; all questions were answered  Patient demonstrated a good understanding of the situation, understands that she may have a breast malignancy  Please do not hesitate to contact me if you have any questions or need additional information  Thank you for this consult   _________________________________________________________________________________    Chief Complaint   Patient presents with   • Shortness of Breath     States her PCP ordered ABX over the phone and patient had outpatient CXR done  Completed the Zithrommax tonight  Continues with cough and SOB  Patient uses 2 LNC at bedtime ( does not use her CPAP ) Sat is 87 % after ambulating to triage, placed on 2 LNC sat is 96 %  Coughing up cloudy phlegm      History of Present Illness: 53-year-old female admitted on March 26, 2023 with progressive shortness of breath  Patient has a history of obesity, KATRINA, diabetes, hypertension and pulmonary nodules    Recent mammogram demonstrated abnormalities in the right breast  Recent biopsy demonstrated atypical cells but no absolute diagnosis for invasive breast cancer  Presently Mrs Alicia Browne states feeling +/-, still with shortness of breath and wheezing  Activities are nil  No pain control issues, no bleeding, no fevers  No recent GI,  or GYN issues  Patient states that she has had pulmonary nodules for a number of years with no evidence of growth  Patient smoked cigarettes previously but none for approximately 25+ years  Review of Systems   Constitutional: Positive for activity change and fatigue  HENT: Negative  Eyes: Negative  Respiratory: Positive for shortness of breath and wheezing  Cardiovascular: Negative  Gastrointestinal: Negative  Endocrine: Negative  Genitourinary: Negative  Musculoskeletal: Negative  Skin: Negative  Allergic/Immunologic: Negative  Neurological: Negative  Hematological: Negative  Psychiatric/Behavioral: Negative  All other systems reviewed and are negative      Patient Active Problem List   Diagnosis   • Moderate persistent asthma with acute exacerbation   • Morbid obesity with BMI of 45 0-49 9, adult ([facility])   • Lower leg edema   • Paroxysmal atrial fibrillation ([facility])   • Mixed hyperlipidemia   • Anemia   • Essential hypertension   • PONV (postoperative nausea and vomiting)   • Gastroesophageal reflux disease   • Nephrolithiasis   • Arthritis   • S/P total knee replacement, right   • On continuous oral anticoagulation - eliquis   • Dyspnea   • Status post reverse total replacement of left shoulder   • Stage 3 chronic kidney disease ([facility])   • Peripheral venous insufficiency   • Post-herpetic polyneuropathy   • Raynaud's disease   • Lung nodule   • Chronic diastolic heart failure ([facility])   • Multiple pulmonary nodules determined by computed tomography of lung   • Lump of left breast   • Pressure injury of deep tissue of sacral region   • Obesity hypoventilation syndrome ([facility])   • KATRINA (obstructive sleep apnea) • Elevated serum creatinine   • Chronic respiratory failure with hypoxia and hypercapnia (HCC)   • Platelets decreased (HCC)   • PSVT (paroxysmal supraventricular tachycardia) (HCC)   • Rash   • Impaired mobility and ADLs   • Pressure injury of sacral region, stage 2 (HCC)   • Blister of right heel   • Heel blister   • Chronic obstructive pulmonary disease, unspecified COPD type (HCC)   • Fall   • Syncope   • Type 2 diabetes mellitus with hyperglycemia, without long-term current use of insulin (HCC)   • CHF (congestive heart failure) (HCC)   • Atrial fibrillation (HCC)   • CKD (chronic kidney disease)   • Hypertension   • Obstructive sleep apnea   • Malignant neoplasm of overlapping sites of right female breast [facility])     Past Medical History:   Diagnosis Date   • SEBASTIAN (acute kidney injury) ([facility] ) 1/23/2023   • Anemia    • Asthma    • Chronic kidney disease    • COPD (chronic obstructive pulmonary disease) ([facility] )    • COVID-19 January 2022   • Diabetes mellitus ([place] [address] )    • GERD (gastroesophageal reflux disease)    • Hyperlipidemia    • Hypertension    • PONV (postoperative nausea and vomiting)    • SVT (supraventricular tachycardia) ([facility])      Past Surgical History:   Procedure Laterality Date   • APPENDECTOMY     • BREAST BIOPSY Right     years ago when she was 23   • BREAST BIOPSY Right 03/13/2023   • CYSTOSCOPY W/ LASER LITHOTRIPSY Left 07/12/2016    Procedure: CYSTOSCOPY URETEROSCOPY WITH LITHOTRIPSY HOLMIUM LASER, RETROGRADE PYELOGRAM AND INSERTION STENT URETERAL;  Surgeon: Sandro Biswas MD;  Location: [address] [place] [address];  Service:    • DILATION AND CURETTAGE OF UTERUS     • IR THORACENTESIS  03/18/2022   • JOINT REPLACEMENT      right knee   • KNEE ARTHROPLASTY Right    • MAMMO STEREOTACTIC BREAST BIOPSY RIGHT (ALL INC) Right 3/13/2023   • DE ARTHROPLASTY GLENOHUMERAL JOINT TOTAL SHOULDER Left 03/01/2022    Procedure: ARTHROPLASTY SHOULDER REVERSE;  Surgeon: Yordan Steen MD;  Location: [address] [place] [address]; Service: Orthopedics   • MD CYSTO BLADDER W/URETERAL CATHETERIZATION Left 2016    Procedure: CYSTOSCOPY RETROGRADE PYELOGRAM WITH INSERTION STENT URETERAL, left;  Surgeon: Minerva Islas MD;  Location: [place] [facility];  Service: Urology   • SHOULDER ARTHROTOMY Left      Family History   Problem Relation Age of Onset   • Heart disease Mother    • Diabetes Father    • Thyroid cancer Sister    • Emphysema Maternal Grandmother    • Heart disease Family      Social History     Socioeconomic History   • Marital status: Single     Spouse name: Not on file   • Number of children: 0   • Years of education: 15   • Highest education level: Associate degree: academic program   Occupational History   • Not on file   Tobacco Use   • Smoking status: Former     Packs/day:      Years:  00     Pack years: 20      Types: Cigarettes     Quit date: 1996     Years since quittin 7   • Smokeless tobacco: Never   Vaping Use   • Vaping Use: Never used   Substance and Sexual Activity   • Alcohol use: Not Currently     Comment: rarely   • Drug use: Never   • Sexual activity: Not Currently   Other Topics Concern   • Not on file   Social History Narrative    ** Merged History Encounter **          Social Determinants of Health     Financial Resource Strain: Not on file   Food Insecurity: No Food Insecurity   • Worried About Running Out of Food in the Last Year: Never true   • Ran Out of Food in the Last Year: Never true   Transportation Needs: No Transportation Needs   • Lack of Transportation (Medical): No   • Lack of Transportation (Non-Medical):  No   Physical Activity: Not on file   Stress: Not on file   Social Connections: Not on file   Intimate Partner Violence: Not on file   Housing Stability: Low Risk    • Unable to Pay for Housing in the Last Year: No   • Number of Places Lived in the Last Year: 2   • Unstable Housing in the Last Year: No       Current Facility-Administered Medications:   •  albuterol (PROVENTIL HFA,VENTOLIN HFA) inhaler 2 puff, 2 puff, Inhalation, Q6H PRN, Nicolás Wilson MD, 2 puff at 03/26/23 1007  •  aluminum-magnesium hydroxide-simethicone (MYLANTA) oral suspension 30 mL, 30 mL, Oral, Q6H PRN, Nicolás Wilson MD  •  ammonium lactate (LAC-HYDRIN) 12 % lotion, , Topical, BID, Nicolás Wilson MD, Given at 03/26/23 1819  •  apixaban (ELIQUIS) tablet 5 mg, 5 mg, Oral, BID, Nicolás Wilson MD, 5 mg at 03/26/23 1703  •  docusate sodium (COLACE) capsule 100 mg, 100 mg, Oral, Daily, Nicolás Wilson MD, 100 mg at 03/26/23 1002  •  fluticasone (FLONASE) 50 mcg/act nasal spray 1 spray, 1 spray, Nasal, Daily, Nicolás Wilson MD, 1 spray at 03/26/23 1004  •  fluticasone-vilanterol 200-25 mcg/actuation 1 puff, 1 puff, Inhalation, Daily, Shin Lorenzana MD, 1 puff at 03/26/23 1449  •  insulin glargine (LANTUS) subcutaneous injection 25 Units 0 25 mL, 25 Units, Subcutaneous, Q12H [address], Nicolás Wilson MD, 25 Units at 03/26/23 2106  •  insulin lispro (HumaLOG) 100 units/mL subcutaneous injection 1-5 Units, 1-5 Units, Subcutaneous, HS, Nicolás Wilson MD, 2 Units at 03/26/23 2106  •  insulin lispro (HumaLOG) 100 units/mL subcutaneous injection 1-6 Units, 1-6 Units, Subcutaneous, TID AC, 1 Units at 03/27/23 0805 **AND** Fingerstick Glucose (POCT), , , TID ACNicolás MD  •  insulin lispro (HumaLOG) 100 units/mL subcutaneous injection 10 Units, 10 Units, Subcutaneous, TID With Meals, Nicolás Wilson MD, 10 Units at 03/27/23 0805  •  ipratropium-albuterol (DUO-NEB) 0 5-2 5 mg/3 mL inhalation solution 3 mL, 3 mL, Nebulization, TID, Shin Lorenzana MD, 3 mL at 03/27/23 0736  •  metoprolol succinate (TOPROL-XL) 24 hr tablet 50 mg, 50 mg, Oral, Daily, Nicolás Wilson MD, 50 mg at 03/26/23 1004  •  montelukast (SINGULAIR) tablet 10 mg, 10 mg, Oral, HS, Nicolás Wilson MD, 10 mg at 03/26/23 2106  •  nystatin (MYCOSTATIN) powder, , Topical, BID, Spike Rowe MD, Given at 03/26/23 1819  •  ondansetron (ZOFRAN) injection 4 mg, 4 mg, Intravenous, Q6H PRN, "Shant Langley MD  •  pravastatin (PRAVACHOL) tablet 80 mg, 80 mg, Oral, Daily With Jaxon Hood MD, 80 mg at 03/26/23 1702  •  predniSONE tablet 40 mg, 40 mg, Oral, Daily, Lizeth Dowling MD, 40 mg at 03/26/23 1421  •  pregabalin (LYRICA) capsule 100 mg, 100 mg, Oral, BID, Shant Langley MD, 100 mg at 03/26/23 1705  •  semaglutide (1 mg/dose) (Ozempic) injection pen 1 mg, 1 mg, Subcutaneous, Q7 Days, Shant Langley MD  •  torsemide [facility]) tablet 20 mg, 20 mg, Oral, Daily, Shant Langley MD, 20 mg at 03/26/23 1003    Allergies   Allergen Reactions   • Penicillins Hives   • Moxifloxacin Other (See Comments)     unknown   • Oxycodone-Acetaminophen Other (See Comments)     GI upset   • Zinc Acetate Other (See Comments)     unknown   • Penicillins Hives   • Asa [Aspirin] GI Intolerance   • Indocin [Indomethacin] Other (See Comments)     Made patient \"loopy\"   • Other Other (See Comments)     unknown   • Tannic Acid Rash       Vitals:    03/27/23 0738   BP: 144/76   Pulse: 82   Resp: 18   Temp: 97 9 °F (36 6 °C)   SpO2: 96%     Physical Exam  Constitutional:       Appearance: She is well-developed  HENT:      Head: Normocephalic and atraumatic  Right Ear: External ear normal       Left Ear: External ear normal    Eyes:      Conjunctiva/sclera: Conjunctivae normal       Pupils: Pupils are equal, round, and reactive to light  Cardiovascular:      Rate and Rhythm: Normal rate and regular rhythm  Heart sounds: Normal heart sounds  Pulmonary:      Effort: Pulmonary effort is normal       Breath sounds: Normal breath sounds  Comments: Distant breath sounds, scattered bilateral wheezing  Abdominal:      General: Bowel sounds are normal       Palpations: Abdomen is soft  Comments: Morbidly obese, + bowel sounds, nontender, no guarding   Musculoskeletal:         General: Normal range of motion  Cervical back: Normal range of motion and neck supple  Skin:     General: Skin is warm        Comments: " Slightly pale, warm, moist, no petechiae or ecchymoses   Neurological:      Mental Status: She is alert and oriented to person, place, and time  Deep Tendon Reflexes: Reflexes are normal and symmetric  Psychiatric:         Behavior: Behavior normal          Thought Content: Thought content normal          Judgment: Judgment normal      Breast deferred, no axillary adenopathy bilaterally  Extremities: 1+ bilateral lower extreme edema, no cords, pulses are decreased   lymphatics: Exam, no cervical, supraclavicular adenopathy bilaterally    Labs     Latest Reference Range & Units 03/27/23 05:26   WBC 4 31 - 10 16 Thousand/uL 8 54   Red Blood Cell Count 3 81 - 5 12 Million/uL 3 57 (L)   Hemoglobin 11 5 - 15 4 g/dL 10 6 (L)   HCT 34 8 - 46 1 % 33 9 (L)   MCV 82 - 98 fL 95   MCH 26 8 - 34 3 pg 29 7   MCHC 31 4 - 37 4 g/dL 31 3 (L)   RDW 11 6 - 15 1 % 14 2   Platelet Count 101 - 390 Thousands/uL 180      Latest Reference Range & Units 03/27/23 05:26   Sodium 135 - 147 mmol/L 141   Potassium 3 5 - 5 3 mmol/L 5 0   Chloride 96 - 108 mmol/L 103   CO2 21 - 32 mmol/L 32   Anion Gap 4 - 13 mmol/L 6   BUN 5 - 25 mg/dL 47 (H)   Creatinine 0 60 - 1 30 mg/dL 1 47 (H)   Glucose, Random 65 - 140 mg/dL 173 (H)   Calcium 8 4 - 10 2 mg/dL 8 3 (L)     Imaging    3/26/2023 CAT scan chest abdomen pelvis without    CHEST     LUNGS:  Multiple nodules, unchanged since 3/9/2022:  5 mm anterior right apical nodule, image 6/23   5 mm left lower lobe superior segment nodule, image 6/40   3 mm posterior lateral right upper lobe nodule, image 6/47   6 mm anterior right lower lobe nodule, image 6/60      No acute infiltrates  There is no tracheal or endobronchial lesion      PLEURA:  Unremarkable    IMPRESSION:     No evidence of malignancy or source for sepsis identified in the chest, abdomen or pelvis  Stable pulmonary nodules  Hepatomegaly  Colonic diverticulosis  3/9/2023 mammogram diagnostic right    Suspicious microcalcification for which stereotactic core biopsy sampling is recommended, category 4     1/12/2023 mammogram screening bilateral   Impression stated that additional imaging required, right was category 0, incomplete, needs additional evaluation  Pathology    Case Report   Surgical Pathology Report                         Case: [id number]                                    Authorizing Provider: Pati Hamlin MD  Collected:           03/13/2023 1055               Ordering Location:     [address] [place] [address] Received:            03/13/2023 1153                                      Mammography                                                                   Pathologist:           Roxana Kessler MD                                                        Specimen:    Breast, Right, right breast calcs  9:00                                                    Final Diagnosis   A  Breast, Right, right breast calcs  9:00:  -Fragments of Atypical intraductal papillary proliferation with central hyalinized stromal core and associated calcification      Comment  The differential diagnosis includes  ductal carcinoma in situ /DCIS arising in the background of intraductal papilloma vs  intraductal papilloma with atypical ductal hyperplasia   Further characterization will be performed on the resection specimen       Note   stain highlights  intraductal epithelial proliferation while CK5/6 shows patchy staining   Calponin stain highlights myoepithelial layer surrounding the lesion    Controls reacted appropriately    Electronically signed by Anisha Wood MD on 3/15/2023 at 10:56 AM

## 2023-03-27 NOTE — PLAN OF CARE
Problem: PHYSICAL THERAPY ADULT  Goal: Performs mobility at highest level of function for planned discharge setting  See evaluation for individualized goals  Description: Treatment/Interventions: Functional transfer training, LE strengthening/ROM, Elevations, Therapeutic exercise, Endurance training, Gait training, Bed mobility, Patient/family training, Equipment eval/education  Equipment Recommended:  (Pt has a walker, cane and 02)       See flowsheet documentation for full assessment, interventions and recommendations  Note: Prognosis: Good  Problem List: Decreased strength, Decreased endurance, Decreased mobility, Impaired balance  Assessment: Patient is an 81y o  year old female seen for Physical Therapy evaluation  Patient admitted with Dyspnea  Comorbidities affecting patient's physical performance include: morbid obesity, DM, afib, anemia, COPD, falls  Personal factors affecting patient at time of initial evaluation include: inability to navigate community distances, positive fall history and inability to live alone  Prior to admission, patient was independent with functional mobility without assistive device and independent with ADLS  Please find objective findings from Physical Therapy assessment regarding body systems outlined above with impairments and limitations including weakness, impaired balance, decreased endurance, decreased activity tolerance, decreased functional mobility tolerance, fall risk and SOB upon exertion  The Barthel Index was used as a functional outcome tool presenting with a score of Barthel Index Score: 65 today indicating moderate limitations of functional mobility and ADLS  Patient's clinical presentation is currently unstable/unpredictable as seen in patient's presentation of increased fall risk, new onset of impairment of functional mobility, decreased endurance and new onset of weakness   Pt would benefit from continued Physical Therapy treatment to address deficits as defined above and maximize level of functional mobility  As demonstrated by objective findings, the assigned level of complexity for this evaluation is high  The patient's AM-PAC Basic Mobility Inpatient Short Form Raw Score is 17  A Raw score of greater than 16 suggests the patient may benefit from discharge to home  PT Discharge Recommendation: Home with home health rehabilitation    See flowsheet documentation for full assessment

## 2023-03-27 NOTE — ASSESSMENT & PLAN NOTE
Presented with 2 weeks of worsening of shortness of breath and cough and continue to worsen in spite of outpatient rx including abx  Diffuse wheezing noted associated with cough  CT chest without any acute abnormality  No evidence of pneumonia  Afebrile  Normal white count  Procalcitonin negative    Improving still with wheezing  · Continue bronchodilators  · Discontinued IV Solu-Medrol 40 mg every 12 hours  · Continue Vik Landaverdeulair  · Pulmonary follow-up  · Home O2 evaluation prior to discharge   · Prednisone 40 mg daily with taper by 10 mg every 2 days

## 2023-03-27 NOTE — PLAN OF CARE
Problem: Potential for Falls  Goal: Patient will remain free of falls  Description: INTERVENTIONS:  - Educate patient/family on patient safety including physical limitations  - Instruct patient to call for assistance with activity   - Consult OT/PT to assist with strengthening/mobility   - Keep Call bell within reach  - Keep bed low and locked with side rails adjusted as appropriate  - Keep care items and personal belongings within reach  - Initiate and maintain comfort rounds  - Make Fall Risk Sign visible to staff  - Offer Toileting every 2  Hours, in advance of need  - Initiate/Maintain bed alarm  - Obtain necessary fall risk management equipment: yellow socks  - Apply yellow socks and bracelet for high fall risk patients  - Consider moving patient to room near nurses station  Outcome: Progressing     Problem: Nutrition/Hydration-ADULT  Goal: Nutrient/Hydration intake appropriate for improving, restoring or maintaining nutritional needs  Description: Monitor and assess patient's nutrition/hydration status for malnutrition  Collaborate with interdisciplinary team and initiate plan and interventions as ordered  Monitor patient's weight and dietary intake as ordered or per policy  Utilize nutrition screening tool and intervene as necessary  Determine patient's food preferences and provide high-protein, high-caloric foods as appropriate       INTERVENTIONS:  - Monitor oral intake, urinary output, labs, and treatment plans  - Assess nutrition and hydration status and recommend course of action  - Evaluate amount of meals eaten  - Assist patient with eating if necessary   - Allow adequate time for meals  - Recommend/ encourage appropriate diets, oral nutritional supplements, and vitamin/mineral supplements  - Order, calculate, and assess calorie counts as needed  - Recommend, monitor, and adjust tube feedings and TPN/PPN based on assessed needs  - Assess need for intravenous fluids  - Provide specific nutrition/hydration education as appropriate  - Include patient/family/caregiver in decisions related to nutrition  Outcome: Progressing     Problem: SAFETY ADULT  Goal: Patient will remain free of falls  Description: INTERVENTIONS:  - Educate patient/family on patient safety including physical limitations  - Instruct patient to call for assistance with activity   - Consult OT/PT to assist with strengthening/mobility   - Keep Call bell within reach  - Keep bed low and locked with side rails adjusted as appropriate  - Keep care items and personal belongings within reach  - Initiate and maintain comfort rounds  - Make Fall Risk Sign visible to staff  - Offer Toileting every 2  Hours, in advance of need  - Initiate/Maintain bed alarm  - Obtain necessary fall risk management equipment: yellow socks    - Apply yellow socks and bracelet for high fall risk patients  - Consider moving patient to room near nurses station  Outcome: Progressing     Problem: RESPIRATORY - ADULT  Goal: Achieves optimal ventilation and oxygenation  Description: INTERVENTIONS:  - Assess for changes in respiratory status  - Assess for changes in mentation and behavior  - Position to facilitate oxygenation and minimize respiratory effort  - Oxygen administered by appropriate delivery if ordered  - Initiate smoking cessation education as indicated  - Encourage broncho-pulmonary hygiene including cough, deep breathe, Incentive Spirometry  - Assess the need for suctioning and aspirate as needed  - Assess and instruct to report SOB or any respiratory difficulty  - Respiratory Therapy support as indicated  Outcome: Progressing     Problem: SKIN/TISSUE INTEGRITY - ADULT  Goal: Skin Integrity remains intact(Skin Breakdown Prevention)  Description: Assess:  -Perform Hung assessment every shift  -Clean and moisturize skin every shift  -Inspect skin when repositioning, toileting, and assisting with ADLS  -Assess under medical devices such as n/c  every shift  -Assess extremities for adequate circulation and sensation     Bed Management:  -Have minimal linens on bed & keep smooth, unwrinkled  -Change linens as needed when moist or perspiring  -Avoid sitting or lying in one position for more than 2 hours while in bed  -Keep HOB at 45 degrees     Toileting:  -Offer bedside commode  -Assess for incontinence every 2 hours  -Use incontinent care products after each incontinent episode such as barrier cream    Activity:  -Encourage activity and walks on unit  -Encourage or provide ROM exercises   -Turn and reposition patient every 2 Hours  -Use appropriate equipment to lift or move patient in bed  -Instruct/ Assist with weight shifting every 15 minutes  when out of bed in chair  -Consider limitation of chair time 4  hour intervals    Skin Care:  -Avoid use of baby powder, tape, friction and shearing, hot water or constrictive clothing  -Relieve pressure over bony prominences using waffle cushion   -Do not massage red bony areas    Next Steps:  -Consider consults to  interdisciplinary teams such as  wound care, PT/OT  Outcome: Progressing     Problem: MOBILITY - ADULT  Goal: Maintain or return to baseline ADL function  Description: INTERVENTIONS:  -  Assess patient's ability to carry out ADLs; assess patient's baseline for ADL function and identify physical deficits which impact ability to perform ADLs (bathing, care of mouth/teeth, toileting, grooming, dressing, etc )  - Assess/evaluate cause of self-care deficits   - Assess range of motion  - Assess patient's mobility; develop plan if impaired  - Assess patient's need for assistive devices and provide as appropriate  - Encourage maximum independence but intervene and supervise when necessary  - Involve family in performance of ADLs  - Assess for home care needs following discharge   - Consider OT consult to assist with ADL evaluation and planning for discharge  - Provide patient education as appropriate  Outcome: Progressing

## 2023-03-27 NOTE — ASSESSMENT & PLAN NOTE
Noncompliant with BiPAP  - uses 2 liters oxygen for sleeping   - now requiring oxygen even at daytime and even on rest   Pulmonary following, input appreciated  Check ABG in a m  on 2 L of supplemental oxygen

## 2023-03-27 NOTE — DISCHARGE INSTR - OTHER ORDERS
Plan:   Skin care plans:    1-Hydraguard barrier cream or equivalent to bilateral sacrum, buttock and heels 2x/day and as needed  2-Float heels on 2 pillows to offload pressure when in bed or when out of bed to reclining chair  3-Pressure redistribution cushion in chair when out of bed - limit sitting to maximum of 2 hrs for meals then return back to bed to offload sacrobuttock area for 2 hours  Walk with assist as per Physician order  4-Moisturize skin daily with skin nourishing cream   5-Turn/reposition in bed every 2 hrs for pressure re-distribution on skin  6-Follow up with Janell Washington for right diabetic foot wound: call [phone] to make appointment    7-Follow up with Dr Laly Kebede for routine diabetic foot care:   [address] [facility] [id number] [facility]

## 2023-03-28 VITALS
OXYGEN SATURATION: 96 % | RESPIRATION RATE: 18 BRPM | HEART RATE: 92 BPM | HEIGHT: 62 IN | BODY MASS INDEX: 47.84 KG/M2 | WEIGHT: 260 LBS | SYSTOLIC BLOOD PRESSURE: 161 MMHG | DIASTOLIC BLOOD PRESSURE: 75 MMHG | TEMPERATURE: 98.1 F

## 2023-03-28 PROBLEM — R93.89 ABNORMAL CT SCAN, CHEST: Status: ACTIVE | Noted: 2023-03-28

## 2023-03-28 LAB
ANION GAP SERPL CALCULATED.3IONS-SCNC: 7 MMOL/L (ref 4–13)
ARTERIAL PATENCY WRIST A: YES
BASE EXCESS BLDA CALC-SCNC: 2.9 MMOL/L
BODY TEMPERATURE: 98.4 DEGREES FEHRENHEIT
BUN SERPL-MCNC: 56 MG/DL (ref 5–25)
CALCIUM SERPL-MCNC: 8.5 MG/DL (ref 8.4–10.2)
CHLORIDE SERPL-SCNC: 101 MMOL/L (ref 96–108)
CO2 SERPL-SCNC: 30 MMOL/L (ref 21–32)
CREAT SERPL-MCNC: 1.54 MG/DL (ref 0.6–1.3)
ERYTHROCYTE [DISTWIDTH] IN BLOOD BY AUTOMATED COUNT: 14.3 % (ref 11.6–15.1)
GFR SERPL CREATININE-BSD FRML MDRD: 33 ML/MIN/1.73SQ M
GLUCOSE SERPL-MCNC: 245 MG/DL (ref 65–140)
GLUCOSE SERPL-MCNC: 270 MG/DL (ref 65–140)
GLUCOSE SERPL-MCNC: 275 MG/DL (ref 65–140)
GLUCOSE SERPL-MCNC: 317 MG/DL (ref 65–140)
HCO3 BLDA-SCNC: 27.8 MMOL/L (ref 22–28)
HCT VFR BLD AUTO: 32.5 % (ref 34.8–46.1)
HGB BLD-MCNC: 10.4 G/DL (ref 11.5–15.4)
MCH RBC QN AUTO: 29.6 PG (ref 26.8–34.3)
MCHC RBC AUTO-ENTMCNC: 32 G/DL (ref 31.4–37.4)
MCV RBC AUTO: 93 FL (ref 82–98)
NASAL CANNULA: 2
O2 CT BLDA-SCNC: 14.9 ML/DL (ref 16–23)
OXYHGB MFR BLDA: 95.2 % (ref 94–97)
PCO2 BLDA: 44 MM HG (ref 36–44)
PCO2 TEMP ADJ BLDA: 43.8 MM HG (ref 36–44)
PH BLD: 7.42 [PH] (ref 7.35–7.45)
PH BLDA: 7.42 [PH] (ref 7.35–7.45)
PLATELET # BLD AUTO: 169 THOUSANDS/UL (ref 149–390)
PMV BLD AUTO: 10.9 FL (ref 8.9–12.7)
PO2 BLD: 78.9 MM HG (ref 75–129)
PO2 BLDA: 79.4 MM HG (ref 75–129)
POTASSIUM SERPL-SCNC: 4.7 MMOL/L (ref 3.5–5.3)
RBC # BLD AUTO: 3.51 MILLION/UL (ref 3.81–5.12)
SODIUM SERPL-SCNC: 138 MMOL/L (ref 135–147)
SPECIMEN SOURCE: ABNORMAL
WBC # BLD AUTO: 10.53 THOUSAND/UL (ref 4.31–10.16)

## 2023-03-28 RX ORDER — IPRATROPIUM BROMIDE AND ALBUTEROL SULFATE 2.5; .5 MG/3ML; MG/3ML
3 SOLUTION RESPIRATORY (INHALATION) 3 TIMES DAILY
Qty: 270 ML | Refills: 0 | Status: SHIPPED | OUTPATIENT
Start: 2023-03-28 | End: 2023-04-27

## 2023-03-28 RX ORDER — FLUTICASONE FUROATE AND VILANTEROL 200; 25 UG/1; UG/1
1 POWDER RESPIRATORY (INHALATION) DAILY
Qty: 60 BLISTER | Refills: 0 | Status: SHIPPED | OUTPATIENT
Start: 2023-03-29 | End: 2023-04-04 | Stop reason: ALTCHOICE

## 2023-03-28 RX ORDER — PREDNISONE 20 MG/1
TABLET ORAL
Qty: 8 TABLET | Refills: 0 | Status: SHIPPED | OUTPATIENT
Start: 2023-03-29 | End: 2023-04-05

## 2023-03-28 RX ORDER — NYSTATIN 100000 [USP'U]/G
POWDER TOPICAL 2 TIMES DAILY
Qty: 15 G | Refills: 0 | Status: SHIPPED | OUTPATIENT
Start: 2023-03-28

## 2023-03-28 RX ADMIN — TORSEMIDE 20 MG: 20 TABLET ORAL at 08:02

## 2023-03-28 RX ADMIN — INSULIN LISPRO 4 UNITS: 100 INJECTION, SOLUTION INTRAVENOUS; SUBCUTANEOUS at 07:58

## 2023-03-28 RX ADMIN — INSULIN GLARGINE 25 UNITS: 100 INJECTION, SOLUTION SUBCUTANEOUS at 08:02

## 2023-03-28 RX ADMIN — DOCUSATE SODIUM 100 MG: 100 CAPSULE, LIQUID FILLED ORAL at 08:01

## 2023-03-28 RX ADMIN — IPRATROPIUM BROMIDE AND ALBUTEROL SULFATE 3 ML: 2.5; .5 SOLUTION RESPIRATORY (INHALATION) at 13:23

## 2023-03-28 RX ADMIN — PREDNISONE 40 MG: 20 TABLET ORAL at 08:01

## 2023-03-28 RX ADMIN — INSULIN LISPRO 3 UNITS: 100 INJECTION, SOLUTION INTRAVENOUS; SUBCUTANEOUS at 11:54

## 2023-03-28 RX ADMIN — INSULIN LISPRO 5 UNITS: 100 INJECTION, SOLUTION INTRAVENOUS; SUBCUTANEOUS at 17:19

## 2023-03-28 RX ADMIN — PREGABALIN 100 MG: 100 CAPSULE ORAL at 17:19

## 2023-03-28 RX ADMIN — APIXABAN 5 MG: 5 TABLET, FILM COATED ORAL at 08:01

## 2023-03-28 RX ADMIN — METOPROLOL SUCCINATE 50 MG: 50 TABLET, EXTENDED RELEASE ORAL at 08:01

## 2023-03-28 RX ADMIN — PRAVASTATIN SODIUM 80 MG: 80 TABLET ORAL at 17:19

## 2023-03-28 RX ADMIN — FLUTICASONE FUROATE AND VILANTEROL TRIFENATATE 1 PUFF: 200; 25 POWDER RESPIRATORY (INHALATION) at 08:05

## 2023-03-28 RX ADMIN — APIXABAN 5 MG: 5 TABLET, FILM COATED ORAL at 17:19

## 2023-03-28 RX ADMIN — METHYLPREDNISOLONE SODIUM SUCCINATE 40 MG: 40 INJECTION, POWDER, FOR SOLUTION INTRAMUSCULAR; INTRAVENOUS at 00:19

## 2023-03-28 RX ADMIN — Medication: at 08:03

## 2023-03-28 RX ADMIN — IPRATROPIUM BROMIDE AND ALBUTEROL SULFATE 3 ML: 2.5; .5 SOLUTION RESPIRATORY (INHALATION) at 07:11

## 2023-03-28 RX ADMIN — NYSTATIN: 100000 POWDER TOPICAL at 08:04

## 2023-03-28 RX ADMIN — INSULIN LISPRO 10 UNITS: 100 INJECTION, SOLUTION INTRAVENOUS; SUBCUTANEOUS at 17:19

## 2023-03-28 RX ADMIN — FLUTICASONE PROPIONATE 1 SPRAY: 50 SPRAY, METERED NASAL at 08:05

## 2023-03-28 RX ADMIN — INSULIN LISPRO 10 UNITS: 100 INJECTION, SOLUTION INTRAVENOUS; SUBCUTANEOUS at 11:53

## 2023-03-28 RX ADMIN — PREGABALIN 100 MG: 100 CAPSULE ORAL at 08:01

## 2023-03-28 RX ADMIN — INSULIN LISPRO 10 UNITS: 100 INJECTION, SOLUTION INTRAVENOUS; SUBCUTANEOUS at 07:58

## 2023-03-28 NOTE — PROGRESS NOTES
-- Patient: Tanner De León  -- MRN: [id number]  -- Aidin Request ID: [id number]  -- Level of care reserved: [address] [place] [address]  -- Partner Reserved: Visiting Nurse [address] [place], [place] [id number] [phone]  -- Clinical needs requested:  -- Geography searched: [phone]  -- Start of Service:  -- Request sent: 11:15am EDT on 3/28/2023 by Ashley Navarrete  -- Partner reserved: 2:34pm EDT on 3/28/2023 by Ashley Navarrete  -- Choice list shared: 2:34pm EDT on 3/28/2023 by Ashley Navarrete

## 2023-03-28 NOTE — DISCHARGE SUMMARY
[address] 47  Discharge- Finn Rota 1950, 81 y o  female MRN: [id number]  Unit/Bed#: [address] [place] [address] Encounter: [id number]  Primary Care Provider: Isabel Cadet MD   Date and time admitted to hospital: 3/25/2023 10:40 PM    * Reactive airway disease with acute exacerbation  Assessment & Plan  Presented with 2 weeks of worsening of shortness of breath and cough and continue to worsen in spite of outpatient rx including abx  Diffuse wheezing noted associated with cough  CT chest without any acute abnormality  No evidence of pneumonia  Afebrile  Normal white count  Procalcitonin negative  Improving still with wheezing  · Continue bronchodilators  · Discontinued IV Solu-Medrol 40 mg every 12 hours  · Continue Breo, Singulair  · Pulmonary follow-up  · Home O2 evaluation prior to discharge   · Prednisone 40 mg daily with taper by 10 mg every 2 days     Abnormal CT scan, chest  Assessment & Plan  · CT chest showing pulmonary nodules  · Pulmonary nodules are stable for 1 year  · Repeat CT of the chest in March 2024    Malignant neoplasm of overlapping sites of right female breast [facility])  Assessment & Plan  S/p biopsy of right breast calcification-noted to have Fragments of Atypical intraductal papillary proliferation with central hyalinized stromal core and associated calcification  - has not yet established care with oncologist or surgeon, awaiting follow-up  - Known bilateral pulmonary nodules unclear significance being monitored   - CT CAP without any evidence of malignancy  · Seen by oncology, input appreciated    Outpatient follow-up    Obstructive sleep apnea  Assessment & Plan  Noncompliant with BiPAP  - uses 2 liters oxygen for sleeping   - now requiring oxygen even at daytime and even on rest   Pulmonary following, input appreciated  Check ABG in a m  on 2 L of supplemental oxygen    Atrial fibrillation ([address] )  Assessment & Plan  On metoprolol and eliquis - continued  Tele monitoring-no evidence of arrhythmia  Discontinued    Type 2 diabetes mellitus with hyperglycemia, without long-term current use of insulin [facility])  Assessment & Plan  Lab Results   Component Value Date    HGBA1C 8 1 (H) 03/25/2023       Recent Labs     03/26/23  1149 03/26/23  1551 03/26/23  2031 03/27/23  0715   POCGLU 148* 137 225* 161*       Blood Sugar Average: Last 72 hrs:  (P) 159 8       - on ozempic to help with weight loss  - ozempic held while inpatient   - continue with insulin, insulin sliding scale and carb restricted ADA diet     Acute on chronic respiratory failure with hypoxia and hypercapnia (HCC)  Assessment & Plan  Secondary to obesity hypoventilation/KATRINA, restrictive lung disease  Noncompliant with BiPAP, using nightly at baseline  Currently requiring 2 L of supplemental oxygen continuously  Pulmonary following, input appreciated  · Continue supplemental oxygen, titrate as tolerated  · ABG in a m    · Ambulatory pulse ox reevaluation on discharge  · Outpatient sleep nighttime sleep study as already advised    Multiple pulmonary nodules determined by computed tomography of lung  Assessment & Plan  - being followed up outpatient   - no bx done since as per patient too small to characterize     Chronic diastolic heart failure ([facility] [address] )  Assessment & Plan  Wt Readings from Last 3 Encounters:   03/27/23 118 kg (260 lb)   03/24/23 117 kg (258 lb)   03/23/23 117 kg (258 lb)     Appears compensated  Continue current meds        S/P total knee replacement, right  Assessment & Plan  PT/OT    Gastroesophageal reflux disease  Assessment & Plan  - on Protonix     Essential hypertension  Assessment & Plan  On demadex and toprol - continue     Mixed hyperlipidemia  Assessment & Plan  On crestor - changed to pravachol while inpatient     Morbid obesity with BMI of 45 0-49 9, adult ([facility] [address] )  Assessment & Plan  - counseled weight loss by admitting physician  - hypothyroidism on synthroid - check levels of "TSH          Medical Problems     Resolved Problems  Date Reviewed: 3/28/2023   None       Discharging Physician / Practitioner: RK Roque  PCP: Prieto Ledbetter MD  Admission Date:   Admission Orders (From admission, onward)     Ordered        03/26/23 1544  Inpatient Admission  Once            03/26/23 0116  Place in Observation  Once                      Discharge Date: 03/28/23    Consultations During Hospital Stay:  · Critical care  · Heme/Onc    Procedures Performed:   · Chest x-ray  · CT chest abdomen and pelvis    Significant Findings / Test Results:   · None        Test Results Pending at Discharge (will require follow up): · None     Outpatient Tests Requested:  · Sleep study    Complications: None    Reason for Admission: Shortness of breath and coughing          Hospital Course:   Marcia Bush is a 81 y o  female patient who originally presented to the hospital on 3/25/2023 due to worsening shortness of breath and coughing and patient was diagnosed with acute on chronic hypoxemic respiratory failure with reactive airway disease  Pulmonary was consulted for management of airway disease and  exacerbation  Discharging patient on prednisone 40 mg to patch every 2 days with a follow-up in office  Hematology oncology was consulted for malignant neoplasm of overlapping sites of right breast  Will follow-up with oncology outpatient  Patient on examination today stated that \"I feel good\"        Please see above list of diagnoses and related plan for additional information       Condition at Discharge: stable    Discharge Day Visit / Exam:     Subjective:  Offered no complaints        Vitals: Blood Pressure: 161/75 (03/28/23 1507)  Pulse: 92 (03/28/23 1507)  Temperature: 98 1 °F (36 7 °C) (03/28/23 1507)  Temp Source: Oral (03/28/23 0800)  Respirations: 18 (03/28/23 1507)  Height: 5' 2\" (157 5 cm) (03/27/23 1048)  Weight - Scale: 118 kg (260 lb) (03/27/23 1048)  SpO2: 96 % (03/28/23 1507) " Physical Exam:   Physical Exam  Constitutional:       General: She is not in acute distress  Appearance: She is obese  HENT:      Head: Normocephalic and atraumatic  Cardiovascular:      Rate and Rhythm: Normal rate  Pulmonary:      Effort: Pulmonary effort is normal  No respiratory distress  Breath sounds: Wheezing present  No rales  Comments: Diminished bilaterally  Abdominal:      General: Bowel sounds are normal  There is no distension  Palpations: Abdomen is soft  Tenderness: There is no abdominal tenderness  There is no guarding or rebound  Musculoskeletal:      Right lower leg: No edema  Left lower leg: No edema  Skin:     General: Skin is warm and dry  Findings: No rash  Neurological:      Mental Status: She is alert  Discussion with Family: Attempted to update  (daughter) via phone  Left voicemail  Discharge instructions/Information to patient and family:   See after visit summary for information provided to patient and family  Provisions for Follow-Up Care:  See after visit summary for information related to follow-up care and any pertinent home health orders  Disposition:   Home with [facility] Services (Reminder: Complete face to face encounter)    Planned Readmission: No     Discharge Statement:  I spent 30 minutes discharging the patient  This time was spent on the day of discharge  I had direct contact with the patient on the day of discharge  Greater than 50% of the total time was spent examining patient, answering all patient questions, arranging and discussing plan of care with patient as well as directly providing post-discharge instructions  Additional time then spent on discharge activities  Discharge Medications:  See after visit summary for reconciled discharge medications provided to patient and/or family        **Please Note: This note may have been constructed using a voice recognition system**

## 2023-03-28 NOTE — ASSESSMENT & PLAN NOTE
· CT chest showing pulmonary nodules  · Pulmonary nodules are stable for 1 year  · Repeat CT of the chest in March 2024

## 2023-03-28 NOTE — CASE MANAGEMENT
Case Management Assessment & Discharge Planning Note    Patient name Toma Hernandez  [facility] [phone] MRN [id number]  : 1950 Date 3/28/2023       Current Admission Date: 3/25/2023  Current Admission Diagnosis:Reactive airway disease with acute exacerbation   Patient Active Problem List    Diagnosis Date Noted   • Abnormal CT scan, chest 2023   • Malignant neoplasm of overlapping sites of right female breast ([facility] ) 2023   • Fall 2023   • Syncope 2023   • Type 2 diabetes mellitus with hyperglycemia, without long-term current use of insulin ([facility] ) 2023   • CHF (congestive heart failure) ([place] [address] ) 2023   • Atrial fibrillation ([place] [address] ) 2023   • CKD (chronic kidney disease) 2023   • Hypertension 2023   • Obstructive sleep apnea 2023   • Chronic obstructive pulmonary disease, unspecified COPD type ([place] [address] ) 2022   • Heel blister 2022   • Blister of right heel 2022   • Impaired mobility and ADLs 2022   • Pressure injury of sacral region, stage 2 ([facility] ) 2022   • Rash 2022   • PSVT (paroxysmal supraventricular tachycardia) ([place] [address] ) 2022   • Chronic respiratory failure with hypoxia and hypercapnia ([facility] ) 2022   • Platelets decreased ([place] [address] ) 2022   • Elevated serum creatinine 2022   • KATRINA (obstructive sleep apnea) 2022   • Obesity hypoventilation syndrome ([facility] ) 2022   • Acute on chronic respiratory failure with hypoxia and hypercapnia ([facility] ) 2022   • Lump of left breast 2022   • Pressure injury of deep tissue of sacral region 2022   • Multiple pulmonary nodules determined by computed tomography of lung 2022   • Chronic diastolic heart failure ([facility] ) 2022   • Peripheral venous insufficiency 2022   • Post-herpetic polyneuropathy 2022   • Raynaud's disease 2022   • Lung nodule 2022   • Stage 3 chronic kidney disease ([facility] )    • Reactive airway disease with acute exacerbation 03/09/2022   • Status post reverse total replacement of left shoulder 03/09/2022   • PONV (postoperative nausea and vomiting) 02/28/2022   • Gastroesophageal reflux disease 02/28/2022   • Nephrolithiasis 02/28/2022   • Arthritis 02/28/2022   • S/P total knee replacement, right 02/28/2022   • On continuous oral anticoagulation - eliquis 02/28/2022   • Essential hypertension 08/01/2019   • Anemia 07/31/2019   • Mixed hyperlipidemia 06/25/2019   • Paroxysmal atrial fibrillation ([facility] ) 05/28/2019   • Lower leg edema 05/27/2019   • Moderate persistent asthma with acute exacerbation 03/08/2018   • Morbid obesity with BMI of 45 0-49 9, adult ([facility] ) 03/08/2018      LOS (days): 2  Geometric Mean LOS (GMLOS) (days): 4 00  Days to GMLOS:2     OBJECTIVE:    Risk of Unplanned Readmission Score: 45 75       Current admission status: Inpatient  Referral Reason: Other (Discharge planning)    Preferred Pharmacy:   [facility], [facility] (57[address] [facility])  [address] [address] [place] [address] [address] (69[address] [facility])  [place] [address]  Phone: [phone] Fax: [phone]    Primary Care Provider: Gela Gómez MD    Primary Insurance: MEDICARE  Secondary Insurance: [employer]    ASSESSMENT:  Active Health Care Proxies     [facility] Representative - [facility]   Primary Phone: [phone] (Mobile)                Readmission Root Cause  30 Day Readmission: No    Patient Information  Admitted from[de-identified] Home  Mental Status: Alert  During Assessment patient was accompanied by: Not accompanied during assessment  Assessment information provided by[de-identified] Patient  Primary Caregiver: Self  Support Systems: Family members  [place] of Residence: [address] [place] [address] do you live in?: [address] entry access options   Select all that apply : Stairs  Number of steps to enter home : 3  Type of Current Residence: [facility]  In the last 12 months, was there a time when you were not able to pay the mortgage or rent on time?: No  In the last 12 months, was there a time when you did not have a steady place to sleep or slept in a shelter (including now)?: No  Homeless/housing insecurity resource given?: N/A  Living Arrangements: Other (Comment) (Lives with niece)    Activities of Daily Living Prior to Admission  Functional Status: Independent  Completes ADLs independently?: Yes  Ambulates independently?: Yes  Does patient use assisted devices?: Yes  Assisted Devices (DME) used: Home Oxygen concentrator, Portable Oxygen tanks, [address]ar[address]  DME Company Name (respiratory supplies):  [facility]  O2 Rate(s): 2L  Does patient currently own DME?: Yes  What DME does the patient currently own?: Home Oxygen concentrator, Portable Oxygen tanks, Shower Chair, Walker, Wheelchair, Straight Cane  Does the patient have a history of Short-Term Rehab?: Yes ([facility])  Does patient have a history of HHC?: Yes ([facility], [facility])  [facility] Yes ([facility])    [facility]  Type of Current Home Care Services: Nurse visit  [address][de-identified] [facility] Provider[de-identified] PCP    Patient Information Continued  Income Source: Pension/USP  Does patient have prescription coverage?: Yes  Within the past 12 months, you worried that your food would run out before you got the money to buy more : Never true  Within the past 12 months, the food you bought just didn't last and you didn't have money to get more : Never true  Food insecurity resource given?: N/A  Does patient receive dialysis treatments?: No  Does patient have a history of substance abuse?: No  Does patient have a history of Mental Health Diagnosis?: No    Means of Transportation  Means of Transport to [facility] [place][de-identified] Family transport  In the past 12 months, has lack of transportation kept you from medical appointments or from getting medications?: No  In the past 12 months, has lack of transportation kept you from meetings, work, or from getting things needed for daily living?: No  Was application for public transport provided?: N/A      DISCHARGE DETAILS:    Discharge planning discussed with[de-identified] Patient  Freedom of Choice: Yes     Comments - Freedom of Choice: SW spoke with patient at bedside to introduce role of CM and conduct assessment  Patient lives with her niece in a trailer home with 3 JESS  Family provides transportation to appointments and patient is independent with ADLs  Her plan is to return home at discharge  Recommendation is for [facility] and patient stated that she receives services from [facility] [place]  SW placed referral for [facility] in [address] [facility] [address] and patient has been accepted  SW will continue to follow       Were Treatment Team discharge recommendations reviewed with patient/caregiver?: Yes  Did patient/caregiver verbalize understanding of patient care needs?: Yes  Were patient/caregiver advised of the risks associated with not following Treatment Team discharge recommendations?: Yes    Contacts  Patient Contacts: Adarsh Montez (sister)  Relationship to Patient[de-identified] Family  Contact Method: Phone  Phone Number: [phone]    [facility]         Is the patient interested in [facility] at discharge?: Yes  [address] requested[de-identified] Nursing, Physical [address] Name[de-identified] [facility] Provider[de-identified] PCP  Home Health Services Needed[de-identified] Evaluate Functional Status and Safety, Gait/ADL Training, Heart Failure Management, Strengthening/Theraputic Exercises to Improve Function  Homebound Criteria Met[de-identified] Requires the Assistance of Another Person for Safe Ambulation or to Leave the Home, Uses an Assist Device (i e  cane, walker, etc)  Supporting Clincal Findings[de-identified] Fatigues Easliy in [employer], Limited Endurance    DME Referral Provided  Referral made for DME?: No    Other Referral/Resources/Interventions Provided:  Interventions: [facility]    Would you like to participate in our [address] service program?  : No - Declined    Treatment Team Recommendation: Home with [facility]  Discharge Destination Plan[de-identified] Home with Afsaneh at Discharge : Family              IMM Given (Date):: 03/27/23

## 2023-03-28 NOTE — ASSESSMENT & PLAN NOTE
Secondary to obesity hypoventilation/KATRINA, restrictive lung disease  Noncompliant with BiPAP, using nightly at baseline  Currently requiring 2 L of supplemental oxygen continuously  Pulmonary following, input appreciated  · Continue supplemental oxygen, titrate as tolerated  · ABG in a m    · Ambulatory pulse ox reevaluation on discharge  · Outpatient sleep nighttime sleep study as already advised

## 2023-03-28 NOTE — OCCUPATIONAL THERAPY NOTE
OT EVALUATION       03/28/23 0935   Note Type   Note type Evaluation   Pain Assessment   Pain Assessment Tool 0-10   Pain Score No Pain   Restrictions/Precautions   Other Precautions O2;Fall Risk   Home Living   Type of Home Mobile home   Home Layout One level  (3 JESS)   Bathroom Shower/Tub Tub/shower unit   Bathroom Equipment Shower chair   Home Equipment Cane;Walker  (O2 at night)   Prior Function   Level of [place] Independent with functional mobility; Needs assistance with IADLS;Needs assistance with ADLs   Lives With Family  Trina De Souza and her family)   Receives Help From Family  (niece assists with ADLS and IADLS)   Falls in the last 6 months 1 to 4   ADL   Eating Assistance 7  Independent   Grooming Assistance 7  Independent   [address] 5  Supervision/Setup   LB [address] 4  [address] 5  Supervision/Setup    [address]  5  Supervision/Setup   Transfers   Sit to [address] to Sit 5  Supervision   Functional Mobility   Functional Mobility 5  Supervision   Additional Comments functional distance in pt room   Balance   Static Sitting Good   Dynamic Sitting Fair +   [address]   Activity Tolerance   Activity Tolerance Patient limited by fatigue   RUE Assessment   RUE Assessment WFL   LUE Assessment   LUE Assessment WFL  (hx of shoulder replacement 90 degrees shoulder flexion AROM)   Cognition   Overall Cognitive Status WFL   Arousal/Participation Cooperative   Attention Within functional limits   Orientation Level Oriented X4   Following Commands Follows all commands and directions without difficulty   Assessment   Limitation Decreased ADL status; Decreased Safe judgement during ADL;Decreased UE strength;Decreased endurance;Decreased high-level ADLs; Decreased self-care trans  (decreased balance and mobility)   Prognosis Good   Assessment Patient evaluated by Occupational Therapy  Patient admitted with Dyspnea  The patients occupational profile, medical and therapy history includes a extensive additional review of physical, cognitive, or psychosocial history related to current functional performance  Comorbidities affecting functional mobility and ADLS include: anemia, asthma, COPD, COVID-19, diabetes and hypertension  Prior to admission, patient was independent with functional mobility without assistive device, requiring assist for ADLS and requiring assist for IADLS  The evaluation identifies the following performance deficits: weakness, decreased ROM, impaired balance, decreased endurance, increased fall risk, new onset of impairment of functional mobility, decreased ADLS, decreased IADLS, decreased activity tolerance, decreased safety awareness, impaired judgement, SOB upon exertion and decreased strength, that result in activity limitations and/or participation restrictions  This evaluation requires clinical decision making of high complexity, because the patient presents with comorbidites that affect occupational performance and required significant modification of tasks or assistance with consideration of multiple treatment options  The Barthel Index was used as a functional outcome tool presenting with a score of Barthel Index Score: 65, indicating marked limitations of functional mobility and ADLS  The patient's raw score on the AM-PAC Daily Activity Inpatient Short Form is 21  A raw score of greater than or equal to 19 suggests the patient may benefit from discharge to home  Please refer to the recommendation of the Occupational Therapist for safe discharge planning  Patient will benefit from skilled Occupational Therapy services to address above deficits and facilitate a safe return to prior level of function     Goals   Patient Goals breathe better   STG Time Frame   (1-7 days)   Short Term Goal  Goals established to promote Patient Goals: breathe better:   Grooming: independent standing at sink; Bathing: supervision; Upper Body Dressing independent; Lower Body Dressing: supervision; Patient will increase ambulatory standard toilet transfer to independent with rolling walker to increase performance and safety with ADLS and functional mobility; Patient will increase standing tolerance to 5 minutes during ADL task to decrease assistance level and decrease fall risk;  Patient will increase functional mobility to and from bathroom with rolling walker independently to increase performance with ADLS and to use a toilet; Patient will tolerate 5 minutes of UE ROM/strengthening to increase general activity tolerance and performance in ADLS/IADLS; Patient will improve functional activity tolerance to 10 minutes of sustained functional tasks to increase participation in basic self-care and decrease assistance level;  Patient will be able to to verbalize understanding and perform energy conservation/proper body mechanics during ADLS and functional mobility at least 75% of the time with minimal cueing to decrease signs of fatigue and increase stamina to return to prior level of function; Patient will increase dynamic sitting balance to good to improve the ability to sit at edge of bed or on a chair for ADLS;  Patient will increase dynamic standing balance to fair to improve postural stability and decrease fall risk during standing ADLS and transfers  LTG Time Frame   (8-14 days)   Long Term Goal Bathing: independent; Lower Body Dressing: independent;  Toileting: independent; Patient will increase standing tolerance to 5 minutes during ADL task to decrease assistance level and decrease fall risk;  Patient will increase functional mobility to and from bathroom with rolling walker independently to increase performance with ADLS and to use a toilet; Patient will tolerate 10 minutes of UE ROM/strengthening to increase general activity tolerance and performance in ADLS/IADLS; Patient will improve functional activity tolerance to 20 minutes of sustained functional tasks to increase participation in basic self-care and decrease assistance level;  Patient will be able to to verbalize understanding and perform energy conservation/proper body mechanics during ADLS and functional mobility at least 90% of the time to decrease signs of fatigue and increase stamina to return to prior level of function;  Patient will increase dynamic standing balance to fair+ to improve postural stability and decrease fall risk during standing ADLS and transfers  Pt will score >/= 24/24 on AM-PAC Daily Activity Inpatient scale to promote safe independence with ADLs and functional mobility; Pt will score >/= 95/100 on Barthel Index in order to decrease caregiver assistance needed and increase ability to perform ADLs and functional mobility  Plan   Treatment Interventions ADL retraining;UE strengthening/ROM; Functional transfer training; Endurance training;Patient/family training;Equipment evaluation/education; Activityengagement; Energy conservation; Compensatory technique education   Goal Expiration Date 04/11/23   OT Frequency 3-5x/wk   Recommendation   OT Discharge Recommendation No rehabilitation needs   AM-PAC Daily Activity Inpatient   Lower Body Dressing 3   Bathing 3   Toileting 3   Upper Body Dressing 4   Grooming 4   Eating 4   Daily Activity Raw Score 21   Daily Activity Standardized Score (Calc for Raw Score >=11) 44 27   AM-PAC Applied Cognition Inpatient   Following a Speech/Presentation 4   Understanding Ordinary Conversation 4   Taking Medications 4   Remembering Where Things Are Placed or Put Away 4   Remembering List of 4-5 Errands 4   Taking Care of Complicated Tasks 4   Applied Cognition Raw Score 24   Applied Cognition Standardized Score 62 21   Barthel Index   Feeding 10   Bathing 0   Grooming Score 5   Dressing Score 5   Bladder Score 5   Bowels Score 10   Toilet Use Score 5 Transfers (Bed/Chair) Score 10   Mobility (Level Surface) Score 10   Stairs Score 5   Barthel Index Score 72   Licensure   [place] License Number  Ana Sebastian MS OTR/L [id number]

## 2023-03-28 NOTE — PLAN OF CARE
Problem: Potential for Falls  Goal: Patient will remain free of falls  Description: INTERVENTIONS:  - Educate patient/family on patient safety including physical limitations  - Instruct patient to call for assistance with activity   - Consult OT/PT to assist with strengthening/mobility   - Keep Call bell within reach  - Keep bed low and locked with side rails adjusted as appropriate  - Keep care items and personal belongings within reach  - Initiate and maintain comfort rounds  - Make Fall Risk Sign visible to staff  - Offer Toileting every 2  Hours, in advance of need  - Initiate/Maintain bed alarm  - Obtain necessary fall risk management equipment: yellow socks  - Apply yellow socks and bracelet for high fall risk patients  - Consider moving patient to room near nurses station  3/27/2023 2234 by Harshad Elena RN  Outcome: Progressing  3/27/2023 2234 by Harshad Elena RN  Outcome: Progressing     Problem: Nutrition/Hydration-ADULT  Goal: Nutrient/Hydration intake appropriate for improving, restoring or maintaining nutritional needs  Description: Monitor and assess patient's nutrition/hydration status for malnutrition  Collaborate with interdisciplinary team and initiate plan and interventions as ordered  Monitor patient's weight and dietary intake as ordered or per policy  Utilize nutrition screening tool and intervene as necessary  Determine patient's food preferences and provide high-protein, high-caloric foods as appropriate       INTERVENTIONS:  - Monitor oral intake, urinary output, labs, and treatment plans  - Assess nutrition and hydration status and recommend course of action  - Evaluate amount of meals eaten  - Assist patient with eating if necessary   - Allow adequate time for meals  - Recommend/ encourage appropriate diets, oral nutritional supplements, and vitamin/mineral supplements  - Order, calculate, and assess calorie counts as needed  - Recommend, monitor, and adjust tube feedings and TPN/PPN based on assessed needs  - Assess need for intravenous fluids  - Provide specific nutrition/hydration education as appropriate  - Include patient/family/caregiver in decisions related to nutrition  Outcome: Progressing     Problem: SAFETY ADULT  Goal: Patient will remain free of falls  Description: INTERVENTIONS:  - Educate patient/family on patient safety including physical limitations  - Instruct patient to call for assistance with activity   - Consult OT/PT to assist with strengthening/mobility   - Keep Call bell within reach  - Keep bed low and locked with side rails adjusted as appropriate  - Keep care items and personal belongings within reach  - Initiate and maintain comfort rounds  - Make Fall Risk Sign visible to staff  - Offer Toileting every 2  Hours, in advance of need  - Initiate/Maintain bed alarm  - Obtain necessary fall risk management equipment: yellow socks    - Apply yellow socks and bracelet for high fall risk patients  - Consider moving patient to room near nurses station  3/27/2023 2234 by Alonso Escalera RN  Outcome: Progressing  3/27/2023 2234 by Alonso Escalera RN  Outcome: Progressing     Problem: RESPIRATORY - ADULT  Goal: Achieves optimal ventilation and oxygenation  Description: INTERVENTIONS:  - Assess for changes in respiratory status  - Assess for changes in mentation and behavior  - Position to facilitate oxygenation and minimize respiratory effort  - Oxygen administered by appropriate delivery if ordered  - Initiate smoking cessation education as indicated  - Encourage broncho-pulmonary hygiene including cough, deep breathe, Incentive Spirometry  - Assess the need for suctioning and aspirate as needed  - Assess and instruct to report SOB or any respiratory difficulty  - Respiratory Therapy support as indicated  Outcome: Progressing     Problem: SKIN/TISSUE INTEGRITY - ADULT  Goal: Skin Integrity remains intact(Skin Breakdown Prevention)  Description: Assess:  -Perform Hung assessment every shift  -Clean and moisturize skin every shift  -Inspect skin when repositioning, toileting, and assisting with ADLS  -Assess under medical devices such as n/c  every shift  -Assess extremities for adequate circulation and sensation     Bed Management:  -Have minimal linens on bed & keep smooth, unwrinkled  -Change linens as needed when moist or perspiring  -Avoid sitting or lying in one position for more than 2 hours while in bed  -Keep HOB at 45 degrees     Toileting:  -Offer bedside commode  -Assess for incontinence every 2 hours  -Use incontinent care products after each incontinent episode such as barrier cream    Activity:  -Encourage activity and walks on unit  -Encourage or provide ROM exercises   -Turn and reposition patient every 2 Hours  -Use appropriate equipment to lift or move patient in bed  -Instruct/ Assist with weight shifting every 15 minutes  when out of bed in chair  -Consider limitation of chair time 4  hour intervals    Skin Care:  -Avoid use of baby powder, tape, friction and shearing, hot water or constrictive clothing  -Relieve pressure over bony prominences using waffle cushion   -Do not massage red bony areas    Next Steps:  -Consider consults to  interdisciplinary teams such as  wound care, PT/OT  Outcome: Progressing

## 2023-03-28 NOTE — PROGRESS NOTES
Spiritual Care Progress Note    3/28/2023  Patient: Val Gandara : 1950  Admission Date & Time: 3/25/2023 2240  MRN: [id number] CSN: [id number]     visited patient per LOS   introduced self & role, explored spiritual, relational, and emotional needs, facilitated storytelling, supported patient in identifying and expressing emotions, and provided prayer per pt request  Patient noted she is part of the [address] [place] [address], though not baptized yet, and she consents to receiving blood products  Patient is well-supported by family members and nate community, and noted her nate brings her a sense of comfort and support  Patient in good spirits and thanked  for providing support  Spiritual care will remain available               Chaplaincy Interventions Utilized:   Empowerment: Encouraged focus on present, Encouraged self-care, Normalized experience of patient/family, and Provided chaplaincy education    Exploration: Explored hope, Explored emotional needs & resources, Explored relational needs & resources, Explored spiritual needs & resources, and Facilitated story telling    Relationship Building: Cultivated a relationship of care and support and Listened empathically    Ritual: Provided prayer      Chaplaincy Outcomes Achieved:  Catharsis, Expressed gratitude, Identified priorities, Spiritual resources utilized, and Verbally processed emotions      Spiritual Coping Strategies Utilized:   Spiritual comfort, Spiritual gratitude, and Spiritual community     [address] [place] [address] Involvement Patient active with Religion   Spiritual Beliefs/Perceptions   Concept of God Accepting   God's Role in Disease Natural   Relationship with God Close   Spiritual Strengths Community, prayer   Support Systems Family members   Stress Factors   Patient Stress Factors Health changes   Coping Responses   Patient Coping Accepting;Open/discussion   Patient Spiritual Assessment   Feelings of Well Being Pt confirmed

## 2023-03-28 NOTE — PROGRESS NOTES
"Progress Note - Pulmonary   Washington Jensen 81 y o  female MRN: [id number]  Unit/Bed#: [address] [address] Encounter: [id number]    Assessment/Plan:    Acute on chronic hypoxic hypercapnic respiratory failure due to obesity hypoventilation and obstructive sleep apnea in the setting of morbid obesity              Baseline oxygen requirement is 2L NC at HS only  She has Bipap, but does not wear it  ABG noted and she will continue supplemental oxygen at bedtime at home with outpatient split-night sleep study tomorrow night as previously planned  Titrate oxygen to maintain POX > or = 89%  Ambulatory POX prior to D/C  Ordered today      Reactive airways disease with acute exacerbation              Continue with DuoNebs 4 times daily and Breo 200 daily with Singulair at bedtime  Continue prednisone 40 mg daily with taper by 10 mg q2days as tolerated      Abnormal CT chest with pulmonary nodules              Pulmonary nodules are stable for 1 year  Repeat CT of the chest in March 2024      Outpatient follow-up as per discharge instructions  Discussed with primary team and   Chief Complaint:    \"I feel good  \"    Subjective:    [place] is sitting out of bed in the chair  She reports she feels good today  She was taken off oxygen today  Saturations are 95% on room air  Shortness of breath is largely improved  No cough at present  No other complaints  Objective:    Vitals: Blood pressure 139/70, pulse 76, temperature 97 9 °F (36 6 °C), resp  rate 18, height 5' 2\" (1 575 m), weight 118 kg (260 lb), SpO2 94 %, not currently breastfeeding  Room air,Body mass index is 47 55 kg/m²        Intake/Output Summary (Last 24 hours) at 3/28/2023 1440  Last data filed at 3/27/2023 2320  Gross per 24 hour   Intake --   Output 1150 ml   Net -1150 ml       Invasive Devices     Peripheral Intravenous Line  Duration           Peripheral IV 03/26/23 " Proximal;Right;Ventral (anterior) Forearm 2 days                Physical Exam:     Physical Exam  Vitals reviewed  Constitutional:       General: She is not in acute distress  Appearance: She is well-developed  She is morbidly obese  She is not toxic-appearing or diaphoretic  HENT:      Head: Normocephalic and atraumatic  Eyes:      General: No scleral icterus  Neck:      Trachea: No tracheal deviation  Cardiovascular:      Rate and Rhythm: Normal rate and regular rhythm  Heart sounds: S1 normal and S2 normal  No murmur heard  No friction rub  No gallop  Pulmonary:      Effort: Pulmonary effort is normal  No tachypnea, accessory muscle usage or respiratory distress  Breath sounds: Normal breath sounds  No stridor  No decreased breath sounds, wheezing, rhonchi or rales  Chest:      Chest wall: No tenderness  Abdominal:      General: Bowel sounds are normal  There is no distension  Palpations: Abdomen is soft  Tenderness: There is no abdominal tenderness  Musculoskeletal:      Cervical back: Neck supple  Right lower leg: No edema  Left lower leg: No edema  Skin:     General: Skin is warm and dry  Findings: No rash  Neurological:      Mental Status: She is alert and oriented to person, place, and time  GCS: GCS eye subscore is 4  GCS verbal subscore is 5  GCS motor subscore is 6  Psychiatric:         Speech: Speech normal          Behavior: Behavior normal  Behavior is cooperative         Labs:   ABG:   Lab Results   Component Value Date    PHART 7 418 03/28/2023    XEZ4XFJ 44 0 03/28/2023    PO2ART 79 4 03/28/2023    HHB4KJA 27 8 03/28/2023    BEART 2 9 03/28/2023    SOURCE Radial, Right 03/28/2023   , CBC:   Lab Results   Component Value Date    WBC 10 53 (H) 03/28/2023    HGB 10 4 (L) 03/28/2023    HCT 32 5 (L) 03/28/2023    MCV 93 03/28/2023     03/28/2023    MCH 29 6 03/28/2023    MCHC 32 0 03/28/2023    RDW 14 3 03/28/2023    MPV 10 9 03/28/2023   , CMP:   Lab Results   Component Value Date    SODIUM 138 03/28/2023    K 4 7 03/28/2023     03/28/2023    CO2 30 03/28/2023    BUN 56 (H) 03/28/2023    CREATININE 1 54 (H) 03/28/2023    CALCIUM 8 5 03/28/2023    EGFR 33 03/28/2023     Imaging and other studies: None today

## 2023-03-28 NOTE — ASSESSMENT & PLAN NOTE
Wt Readings from Last 3 Encounters:   03/27/23 118 kg (260 lb)   03/24/23 117 kg (258 lb)   03/23/23 117 kg (258 lb)     Appears compensated  Continue current meds

## 2023-03-28 NOTE — RESPIRATORY THERAPY NOTE
Home Oxygen Qualifying Test     Patient name: Frankie Kruse        : 1950   Date of Test:  2023  Diagnosis:    Home Oxygen Test:    **Medicare Guidelines require item(s) 1-5 on all ambulatory patients or 1 and 2 on non-ambulatory patients  1  Baseline SPO2 on Room Air at rest 95 %   a  If <= 88% on Room Air add O2 via NC to obtain SpO2 >=88%  If LPM needed, document LPM  needed to reach =>88%    2  SPO2 during exertion on Room Air 92  %  a  During exertion monitor SPO2  If SPO2 increases >=89%, do not add supplemental oxygen    3  SPO2 on Oxygen at Rest  % at  LPM    4  SPO2 during exertion on Oxygen  % at  LPM    5  Test performed during exertion activity  []  Supplemental Home Oxygen is indicated  [x]  Client does not qualify for home oxygen      Walking     Respiratory Additional Notes-     Lexie Mejia, RT

## 2023-03-29 ENCOUNTER — TRANSITIONAL CARE MANAGEMENT (OUTPATIENT)
Dept: INTERNAL MEDICINE CLINIC | Facility: CLINIC | Age: 73
End: 2023-03-29

## 2023-03-29 ENCOUNTER — HOSPITAL ENCOUNTER (OUTPATIENT)
Dept: SLEEP CENTER | Facility: CLINIC | Age: 73
Discharge: HOME | End: 2023-03-29

## 2023-03-29 ENCOUNTER — TELEPHONE (OUTPATIENT)
Dept: PULMONOLOGY | Facility: MEDICAL CENTER | Age: 73
End: 2023-03-29

## 2023-03-29 ENCOUNTER — PATIENT OUTREACH (OUTPATIENT)
Dept: INTERNAL MEDICINE CLINIC | Facility: CLINIC | Age: 73
End: 2023-03-29

## 2023-03-29 DIAGNOSIS — J96.11 CHRONIC RESPIRATORY FAILURE WITH HYPOXIA AND HYPERCAPNIA (HCC): ICD-10-CM

## 2023-03-29 DIAGNOSIS — E11.65 UNCONTROLLED TYPE 2 DIABETES MELLITUS WITH HYPERGLYCEMIA (HCC): Primary | ICD-10-CM

## 2023-03-29 DIAGNOSIS — Z79.4 TYPE 2 DIABETES MELLITUS WITH HYPEROSMOLARITY WITHOUT COMA, WITH LONG-TERM CURRENT USE OF INSULIN (HCC): ICD-10-CM

## 2023-03-29 DIAGNOSIS — E11.00 TYPE 2 DIABETES MELLITUS WITH HYPEROSMOLARITY WITHOUT COMA, WITH LONG-TERM CURRENT USE OF INSULIN (HCC): ICD-10-CM

## 2023-03-29 DIAGNOSIS — E66.2 OBESITY HYPOVENTILATION SYNDROME (HCC): ICD-10-CM

## 2023-03-29 DIAGNOSIS — G47.33 OSA (OBSTRUCTIVE SLEEP APNEA): ICD-10-CM

## 2023-03-29 DIAGNOSIS — Z71.89 COMPLEX CARE COORDINATION: Primary | ICD-10-CM

## 2023-03-29 DIAGNOSIS — J96.12 CHRONIC RESPIRATORY FAILURE WITH HYPOXIA AND HYPERCAPNIA (HCC): ICD-10-CM

## 2023-03-29 LAB
BACTERIA SPT RESP CULT: ABNORMAL
GRAM STN SPEC: ABNORMAL

## 2023-03-29 RX ORDER — BLOOD SUGAR DIAGNOSTIC
1 STRIP MISCELLANEOUS 4 TIMES DAILY
Qty: 400 STRIP | Refills: 1 | Status: SHIPPED | OUTPATIENT
Start: 2023-03-29

## 2023-03-30 ENCOUNTER — TELEPHONE (OUTPATIENT)
Dept: RADIOLOGY | Facility: HOSPITAL | Age: 73
End: 2023-03-30

## 2023-03-30 ENCOUNTER — TELEPHONE (OUTPATIENT)
Dept: OBGYN CLINIC | Facility: OTHER | Age: 73
End: 2023-03-30

## 2023-03-30 ENCOUNTER — TELEPHONE (OUTPATIENT)
Dept: CARDIOLOGY CLINIC | Facility: CLINIC | Age: 73
End: 2023-03-30

## 2023-03-30 ENCOUNTER — PATIENT OUTREACH (OUTPATIENT)
Dept: INTERNAL MEDICINE CLINIC | Facility: CLINIC | Age: 73
End: 2023-03-30

## 2023-03-30 DIAGNOSIS — Z78.9 NEED FOR FOLLOW-UP BY SOCIAL WORKER: Primary | ICD-10-CM

## 2023-03-30 DIAGNOSIS — Z78.9 NEEDS ASSISTANCE WITH COMMUNITY RESOURCES: Primary | ICD-10-CM

## 2023-03-30 LAB
A ALTERNATA IGE QN: <0.1 KUA/I
A FUMIGATUS IGE QN: <0.1 KUA/I
BERMUDA GRASS IGE QN: 0.19 KUA/I
BOXELDER IGE QN: 0.24 KUA/I
C HERBARUM IGE QN: <0.1 KUA/I
CAT DANDER IGE QN: <0.1 KUA/I
CMN PIGWEED IGE QN: 0.14 KUA/I
COMMON RAGWEED IGE QN: 0.16 KUA/I
COTTONWOOD IGE QN: 0.22 KUA/I
D FARINAE IGE QN: <0.1 KUA/I
D PTERONYSS IGE QN: <0.1 KUA/I
DOG DANDER IGE QN: <0.1 KUA/I
LONDON PLANE IGE QN: 0.21 KUA/I
MOUSE URINE PROT IGE QN: <0.1 KUA/I
MT JUNIPER IGE QN: 0.25 KUA/I
MUGWORT IGE QN: 0.13 KUA/I
P NOTATUM IGE QN: <0.1 KUA/I
ROACH IGE QN: 0.11 KUA/I
SHEEP SORREL IGE QN: 0.12 KUA/I
SILVER BIRCH IGE QN: 0.19 KUA/I
TIMOTHY IGE QN: 0.2 KUA/I
TOTAL IGE SMQN RAST: 665 KU/L (ref 0–113)
WALNUT IGE QN: 0.24 KUA/I
WHITE ASH IGE QN: 0.23 KUA/I
WHITE ELM IGE QN: 0.26 KUA/I
WHITE MULBERRY IGE QN: 0.17 KUA/I
WHITE OAK IGE QN: 0.23 KUA/I

## 2023-03-30 NOTE — TELEPHONE ENCOUNTER
Appointment Cancellation or Reschedule     Person calling in care manager   If someone other than patient calling, are they listed on the communication consent form? N/A   Provider Dr Maryana Saldivar   Office Visit Date and Time 4/27/23 9am   Office Visit Location [facility]   Did patient want to reschedule their visit? If so, when was it scheduled to? 4/14/23 1030 [place] Nolan   Did the patient have STAR scheduled for this appointment? N/A   Does the patient need STAR scheduled for their new appointment? N/A   Is this patient calling to reschedule an infusion appointment? N/A   When is their next infusion appointment? n/a   Is this patient a Chemo patient? No   Reason for Cancellation or Reschedule Pt is having pain   Was the No show policy reviewed with patient if patient is canceling within 24 hours? N/A     If the patient is cancelling an appointment and needs their STAR Transport cancelled, please route to [facility]  If the patient is a treatment patient, please route this to the office nurse  If the patient is not on treatment, please route to the Clerical pool based on location  If the patient is a surgical oncology patient, please route to surg/onc clinical pool  Route message as high priority if same day cancellation

## 2023-03-30 NOTE — PROGRESS NOTES
HRR/PCP referral for CCM 9:20 AM    This RNCM spoke with Tray June at [facility] late afternoon yesterday regarding patient needing help coordinate doctors appointments and making them sooner and to try to get patient to come in to see Dr Barba Roles sooner since Dr Arlyn Oshea will be away until 4/10  Patient presently has an appointment with Dr Arlyn Oshea on 4/11/23  De Bachelor states that she sent an order for a nebulizer and Duoneb's to [facility] earlier in the day  Chart was reviewed and this RNCM called patient and introduced self and explained the role of the RNCM and CCM  Patient states she is doing well  She states she lives with her niece Albaro  She states she is independent of all ADL's and can ambulate without assistance  Patient is on O2 continuous at 3 L nc  Supplies are obtained through [facility]  She states that [employer] all her medications and deals with her finances and paper work  She states if she were to need help with any ADL's she is available to help too  Patient states she has all other medications other than the Duoneb's and she takes them as directed  She denies the need for any help/education on managing her medical conditions or medications  She states her niece takes her to all her appointments, to the pharmacy and shopping  Patient is resuming services with [facility] tomorrow  Patient states she would appreciate this NCM's help with coordinating her Dr appointments with cardiology and Oncology Surgeon  Patient states she had a breast biopsy on 3/13 and the site now has a lump and is black and blue and hurts  This just started yesterday  Patient states she does not want to change her appointment with Dr Arlyn Oshea  She does not want to see Dr Barba Roles  She says she would not feel comfortable and does not feel she needs an apt any sooner  Patient declines education/help with medication and medical dx's   (20 min)    This RNCM attempted to call Cardiology- Dr Jono Dow office  There was no answer and a detailed message was left with a request for a call back  Patient does have a follow up appointment on 5/4/23 at 9:00 AM  (5 min)    This RNCM then called Surgical Oncology and spoke with [facility]  Patient's appointment was changed to April 14, 2023 at 10:45 AM with Dr Rick Miramontes at [facility]  (16 min)    This RNCM received a call back from Narinder Quarles at the Cardiologist office and they do not have a sooner appointment with Dr Rondon Listen  This RNCM then called patient above and reviewed the above appointments and the change in the date for her [facility]  She states that all of the appointments are good for her and she has transportation  Patient states that she needs glucometer test strips ordered  Patient states she spoke with her pharmacist and they told her they need a form filled out and sent back to them with the diagnosis code and the type of strip - Verio One Touch  I then called Javon Moss at Dr Deven Massey office and she states she received the Medicare order they requested and it was sent back over electronically  This RNCM then called patient to make her aware and she states that she is having financial issues with obtaining her Breo Ellipta and Ozempic  I told her I would make a referral to the OP SW and she was very appreciative  Referral placed for SW  RNCM to follow

## 2023-03-30 NOTE — PROGRESS NOTES
Referral from [facility]  SW reviewed chart  Pt is oncology pt, newly dx  SW requested referral be placed to the oncology   Message sent to [facility], [facility], Dr Sanjay Riddle and Clement Castleman cc'd

## 2023-03-30 NOTE — PROGRESS NOTES
Sleep Study Documentation    Pre-Sleep Study       Sleep testing procedure explained to patient:YES    Patient napped prior to study:NO    Caffeine:Dayshift worker after 12PM   Caffeine use:NO    Alcohol:Dayshift workers after 5PM: Alcohol use:NO    Typical day for patient:YES       Study Documentation    Sleep Study Indications: Requalify for CPAP, snore/EDS/unrefreshed sleep    Sleep Study: Split Optimal PAP pressure: 7  Leak:None  Snore:Eliminated  REM Obtained:yes  Supplemental O2: no    Minimum SaO2 86  Baseline SaO2 93  PAP mask tried (list all)N30  PAP mask choice (final)N30  PAP mask type:nasal  PAP pressure at which snoring was eliminated 7  Minimum SaO2 at final PAP pressure 89  Mode of Therapy:CPAP  ETCO2:No  CPAP changed to BiPAP:No    Mode of Therapy:CPAP    EKG abnormalities: no     EEG abnormalities: no    Sleep Study Recorded < 2 hours: N/A    Sleep Study Recorded > 2 hours but incomplete study: N/A    Sleep Study Recorded 6 hours but no sleep obtained: NO    Patient classification: retired       Post-Sleep Study    Medication used at bedtime or during sleep study:NO    Patient reports time it took to fall asleep:30 to 60 minutes    Patient reports waking up during study:1 to 2 times  Patient reports returning to sleep in 10 to 30 minutes  Patient reports sleeping 4 to 6 hours without dreaming  Patient reports sleep during study:worse than usual    Patient rated sleepiness: Somewhat sleepy or tired    PAP treatment:yes: Post PAP treatment patient reports feeling unsure if a change is noted and  would wear PAP mask at home

## 2023-03-30 NOTE — TELEPHONE ENCOUNTER
Received VM from Luiz ENRIQUEZ in regards to helping patient obtain a sooner apt  Offered apt for 04/20/23 at 1pm with Dr Lyn Dee Arm decided to keep patients apt for 05/04/2023 with Dr Gina Meyers

## 2023-04-03 ENCOUNTER — PATIENT OUTREACH (OUTPATIENT)
Dept: INTERNAL MEDICINE CLINIC | Facility: CLINIC | Age: 73
End: 2023-04-03

## 2023-04-03 ENCOUNTER — RA CDI HCC (OUTPATIENT)
Dept: OTHER | Facility: HOSPITAL | Age: 73
End: 2023-04-03

## 2023-04-03 DIAGNOSIS — I48.0 PAROXYSMAL ATRIAL FIBRILLATION (HCC): ICD-10-CM

## 2023-04-03 RX ORDER — METOPROLOL SUCCINATE 50 MG/1
TABLET, EXTENDED RELEASE ORAL
Qty: 30 TABLET | Refills: 3 | Status: SHIPPED | OUTPATIENT
Start: 2023-04-03

## 2023-04-03 NOTE — PROGRESS NOTES
SANDRA contacted and spoke with pt, completed assessment  Pt expressed concerns with affording her Breo Ellipta an Ozempic  Pt has already bought it from drug store, but states she cannot continue to buy it because it is so expensive  She used her prescription insurance for both medications  She has tried using [facility] with both but this is not helpful  Lies with her niece, is retired  Pt gets $3200/month from [facility]  Pt said she thinks she may be over income for any prescription assistance but both pt and SW agreed it won't hurt to try and see if any assistance is available  SW and pt discussed pt's emotional well-being  Pt said she is a little stressed as she is going to be working with the oncologist due to the mass in her breast, and will determine if she can get the lump removed  SW advised pt, if she is working with oncologist and has dx of cancer, she would most likely be connected with an oncology social worker  SW advised pt that SW will look into prescription assistance programs and contact pt  Pt was very appreciative and thanked SANDRA for the help  SANDRA looked online for [facility] and [employer] programs and located several sites  SANDRA called and spoke with pt again  SANDRA asked to email these to pt  Pt said she does not have email but her niece does  SW found pt's niece's email  Pt gave permsion to send the links to Ines's email  SANDRA sent email, requested confirmation or receipt of email

## 2023-04-03 NOTE — PROGRESS NOTES
I13 0,E11 22  [facility] 75  coding opportunities          Chart Reviewed number of suggestions sent to Provider: 2     Patients Insurance     Medicare Insurance: [employer]

## 2023-04-04 ENCOUNTER — OFFICE VISIT (OUTPATIENT)
Dept: PULMONOLOGY | Facility: MEDICAL CENTER | Age: 73
End: 2023-04-04

## 2023-04-04 VITALS
SYSTOLIC BLOOD PRESSURE: 124 MMHG | HEIGHT: 62 IN | OXYGEN SATURATION: 97 % | HEART RATE: 78 BPM | RESPIRATION RATE: 12 BRPM | BODY MASS INDEX: 47.11 KG/M2 | TEMPERATURE: 97.6 F | WEIGHT: 256 LBS | DIASTOLIC BLOOD PRESSURE: 82 MMHG

## 2023-04-04 DIAGNOSIS — E66.2 OBESITY HYPOVENTILATION SYNDROME (HCC): ICD-10-CM

## 2023-04-04 DIAGNOSIS — G47.33 OSA (OBSTRUCTIVE SLEEP APNEA): Primary | ICD-10-CM

## 2023-04-04 DIAGNOSIS — J96.12 CHRONIC RESPIRATORY FAILURE WITH HYPOXIA AND HYPERCAPNIA (HCC): Chronic | ICD-10-CM

## 2023-04-04 DIAGNOSIS — G47.33 OSA (OBSTRUCTIVE SLEEP APNEA): ICD-10-CM

## 2023-04-04 DIAGNOSIS — J45.41 MODERATE PERSISTENT ASTHMA WITH ACUTE EXACERBATION: Primary | ICD-10-CM

## 2023-04-04 DIAGNOSIS — J44.9 CHRONIC OBSTRUCTIVE PULMONARY DISEASE, UNSPECIFIED COPD TYPE (HCC): ICD-10-CM

## 2023-04-04 DIAGNOSIS — J96.11 CHRONIC RESPIRATORY FAILURE WITH HYPOXIA AND HYPERCAPNIA (HCC): Chronic | ICD-10-CM

## 2023-04-04 DIAGNOSIS — R91.1 LUNG NODULE: ICD-10-CM

## 2023-04-04 PROBLEM — T78.40XA ALLERGIES: Status: ACTIVE | Noted: 2023-04-04

## 2023-04-04 RX ORDER — FLUTICASONE PROPIONATE AND SALMETEROL 250; 50 UG/1; UG/1
1 POWDER RESPIRATORY (INHALATION) 2 TIMES DAILY
Qty: 60 BLISTER | Refills: 3 | Status: SHIPPED | OUTPATIENT
Start: 2023-04-04 | End: 2023-05-04

## 2023-04-04 NOTE — ASSESSMENT & PLAN NOTE
Split sleep study 3/30/23: KATRINA of mild degree with significant oxygen desaturation and sleep fragmentation  AHI pre treatment 10 9, AHI post treatment 2 8  Low oxygen saturation of 86%  Patient will start CPAP at 7 cmH20 with 1L of oxygen bled in at night time  She does not qualify for oxygen with rest or exertion during daytime hours  17

## 2023-04-04 NOTE — ASSESSMENT & PLAN NOTE
[facility] allergy panel is positive for many different indoor/outdoor allergens  Her IGE was 665  She qualifies for Xolair however she differs injections at this time

## 2023-04-04 NOTE — PROGRESS NOTES
Pulmonary Follow Up Note   Rachele Charles 81 y o  female MRN: [id number]  4/4/2023      Assessment/Plan:    Chronic respiratory failure with hypoxia and hypercapnia (HCC)  Split sleep study 3/30/23: KATRINA of mild degree with significant oxygen desaturation and sleep fragmentation  AHI pre treatment 10 9, AHI post treatment 2 8  Low oxygen saturation of 86%  Patient will start CPAP at 7 cmH20 with 1L of oxygen bled in at night time  She does not qualify for oxygen with rest or exertion during daytime hours  Moderate persistent asthma with acute exacerbation   The patient has been maintained on Breo Ellipta, 1 puff daily  This is too expensive for her  I switched her to Wixela 250-50, 1 puff twice daily  She will rinse her mouth out after each use  She will continue her rescue inhaler as needed  Her nebulizer will be delivered in the next 2-3 business days  She will start duo-neb three times daily  She denies recent exacerbations stating that she believes this last time was due to an infection, not her asthma  She is maintained on montelukast at bedtime  I encouraged loratadine or zyrtect daily was well as her [address] [place] [address] allergy panel is positive for a lot of outdoor/indoor allergens  Her IgE was 665, she qualifies for Xolair but would like to defer injections at this time  Lung nodule  3/26/23 CT chest: Multiple lung nodules, unchanged since 3/9/2022   5 mm anterior right apical nodule  5 mm left lower lobe superior segment nodule  3 mm posterior lateral right upper lobe nodule  6 mm anterior right lower lobe nodule  Will continue to monitor  KATRINA (obstructive sleep apnea)  Split sleep study 3/30/23: KATRINA of mild degree with significant oxygen desaturation and sleep fragmentation  AHI pre treatment 10 9, AHI post treatment 2 8  Low oxygen saturation of 86%  Patient will be started on CPAP with a max pressure of 7 cmH2O and 1L of oxygen bled in   She has been unable to tolerate BiPAP in the past  Chronic obstructive pulmonary disease, unspecified COPD type ([facility] [address] )  This has been stable  She is maintained on Breo however this has been changed to Americana for affordability  She has a rescue inhaler that she uses when needed with activity  She will start duo-neb when she receives her nebulizer  Allergies  [facility] allergy panel is positive for many different indoor/outdoor allergens  Her IGE was 665  She qualifies for Xolair however she differs injections at this time  Visit orders:    Problem List Items Addressed This Visit        Respiratory    Chronic respiratory failure with hypoxia and hypercapnia (HCC) (Chronic)     Split sleep study 3/30/23: KATRINA of mild degree with significant oxygen desaturation and sleep fragmentation  AHI pre treatment 10 9, AHI post treatment 2 8  Low oxygen saturation of 86%  Patient will start CPAP at 7 cmH20 with 1L of oxygen bled in at night time  She does not qualify for oxygen with rest or exertion during daytime hours  Moderate persistent asthma with acute exacerbation - Primary      The patient has been maintained on Breo Ellipta, 1 puff daily  This is too expensive for her  I switched her to Wixela 250-50, 1 puff twice daily  She will rinse her mouth out after each use  She will continue her rescue inhaler as needed  Her nebulizer will be delivered in the next 2-3 business days  She will start duo-neb three times daily  She denies recent exacerbations stating that she believes this last time was due to an infection, not her asthma  She is maintained on montelukast at bedtime  I encouraged loratadine or zyrtect daily was well as her [address] [place] [address] allergy panel is positive for a lot of outdoor/indoor allergens  Her IgE was 665, she qualifies for Xolair but would like to defer injections at this time            Relevant Medications    Fluticasone-Salmeterol (Wixela Inhub) 250-50 mcg/dose inhaler    Lung nodule     3/26/23 CT chest: Multiple lung nodules, unchanged since 3/9/2022   5 mm anterior right apical nodule  5 mm left lower lobe superior segment nodule  3 mm posterior lateral right upper lobe nodule  6 mm anterior right lower lobe nodule  Will continue to monitor  Relevant Medications    Fluticasone-Salmeterol (Saray Audra) 250-50 mcg/dose inhaler    KATRINA (obstructive sleep apnea)     Split sleep study 3/30/23: KATRINA of mild degree with significant oxygen desaturation and sleep fragmentation  AHI pre treatment 10 9, AHI post treatment 2 8  Low oxygen saturation of 86%  Patient will be started on CPAP with a max pressure of 7 cmH2O and 1L of oxygen bled in  She has been unable to tolerate BiPAP in the past           Relevant Orders    CPAP [employer] D[employer]    Chronic obstructive pulmonary disease, unspecified COPD type ([address] )     This has been stable  She is maintained on Breo however this has been changed to Americana for affordability  She has a rescue inhaler that she uses when needed with activity  She will start duo-neb when she receives her nebulizer  Relevant Medications    Fluticasone-Salmeterol (Wixela Inhub) 250-50 mcg/dose inhaler       Other    Obesity hypoventilation syndrome ([address] )       Return if symptoms worsen or fail to improve  History of Present Illness   HPI:  Ari Colemnares is a 81 y o  female who presents today for hospital follow up  She was admitted from March 25, 2023 through March 28, 2023 for reactive airway disease with acute exacerbation  She was treated with bronchodilators, IV Solu-Medrol that was transitioned to prednisone on discharge  There was no evidence of pneumonia on her CT scan  Patient has been doing well since her admission  She does state that the Gina Jennifer is too expensive and would like to be placed on something that is affordable  She is currently using her albuterol rescue inhaler 3 times a day usually with activity  She has not yet received her nebulizer machine    She completed her overnight sleep study on March 29 and it was read this morning  She received a call from Dr Angelo Flores and will be started on CPAP with a max pressure of 7 cmH2O with 1L of oxygen bled in  She states that she will be having surgery to remove a breast lump in the near future and may require pre-op clearance  Her ARISCAT score today is 20, which makes the patient low risk, 1 6% risk of in-hospital post-op pulmonary complications  There are no contraindications from a pulmonary standpoint  Should her respiratory status change prior to surgery, she should cancel and call the office  Review of Systems   Constitutional: Negative for activity change, chills, fatigue and fever  HENT: Positive for congestion and postnasal drip  Respiratory: Positive for cough and shortness of breath (with exertion)  Negative for chest tightness and wheezing  Cardiovascular: Negative for chest pain and palpitations  Allergic/Immunologic: Positive for environmental allergies  Psychiatric/Behavioral: Negative for sleep disturbance  All other systems reviewed and are negative         Medical, Family and Social history reviewed and updated as appropriate    Historical Information   Past Medical History:   Diagnosis Date   • SEBASTIAN (acute kidney injury) ([place] [id number] ) 1/23/2023   • Anemia    • Asthma    • Chronic kidney disease    • COPD (chronic obstructive pulmonary disease) ([place] [address] )    • COVID-19     January 2022   • Diabetes mellitus ([place] [address] )    • GERD (gastroesophageal reflux disease)    • Hyperlipidemia    • Hypertension    • PONV (postoperative nausea and vomiting)    • SVT (supraventricular tachycardia) ([facility])      Past Surgical History:   Procedure Laterality Date   • APPENDECTOMY     • BREAST BIOPSY Right     years ago when she was 23   • BREAST BIOPSY Right 03/13/2023   • CYSTOSCOPY W/ LASER LITHOTRIPSY Left 07/12/2016    Procedure: CYSTOSCOPY URETEROSCOPY WITH LITHOTRIPSY HOLMIUM LASER, RETROGRADE PYELOGRAM AND INSERTION STENT URETERAL;  Surgeon: Tika Quarles MD;  Location: [address] [place] [address];  Service:    • DILATION AND CURETTAGE OF UTERUS     • IR THORACENTESIS  2022   • JOINT REPLACEMENT      right knee   • KNEE ARTHROPLASTY Right    • MAMMO STEREOTACTIC BREAST BIOPSY RIGHT (ALL INC) Right 3/13/2023   • IN ARTHROPLASTY GLENOHUMERAL JOINT TOTAL SHOULDER Left 2022    Procedure: ARTHROPLASTY SHOULDER REVERSE;  Surgeon: Yair Jackson MD;  Location: [address] [place] [address];  Service: Orthopedics   • IN CYSTO BLADDER W/URETERAL CATHETERIZATION Left 2016    Procedure: CYSTOSCOPY RETROGRADE PYELOGRAM WITH INSERTION STENT URETERAL, left;  Surgeon: Tika Quarles MD;  Location: [facility];  Service: Urology   • SHOULDER ARTHROTOMY Left      Family History   Problem Relation Age of Onset   • Heart disease Mother    • Diabetes Father    • Thyroid cancer Sister    • Emphysema Maternal Grandmother    • Heart disease Family        Social History     Tobacco Use   Smoking Status Former   • Packs/day: 1 00   • Years: 20 00   • Pack years: 20 00   • Types: Cigarettes   • Quit date: 1996   • Years since quittin 7   Smokeless Tobacco Never         Meds/Allergies     Current Outpatient Medications:   •  albuterol (PROVENTIL HFA,VENTOLIN HFA) 90 mcg/act inhaler, Inhale 2 puffs every 6 (six) hours as needed for wheezing, Disp: 8 g, Rfl: 4  •  ammonium lactate (LAC-HYDRIN) 12 % lotion, APPLY TOPICALLY TO AFFECTED AREAS twice a day, Disp: , Rfl:   •  apixaban (ELIQUIS) 5 mg, Take 5 mg by mouth 2 (two) times a day, Disp: , Rfl:   •  B Complex-C-Folic Acid (triphrocaps) 1 MG CAPS, take 1 capsule by mouth once daily, Disp: 30 capsule, Rfl: 5  •  Continuous Blood Gluc  (FreeStyle Dawn 2 [place]) NORM, Use 1 Units continuous, Disp: 1 each, Rfl: 0  •  Continuous Blood Gluc Sensor (FreeStyle Dawn 2 Sensor) MISC, Use 1 Units every 14 (fourteen) days, Disp: 2 each, Rfl: 5  •  docusate sodium (COLACE) 100 mg capsule, Take 100 mg by mouth in the morning, Disp: , Rfl:   •  fluticasone (FLONASE) 50 mcg/act nasal spray, 1 spray into each nostril daily, Disp: , Rfl: 0  •  Fluticasone-Salmeterol (Wixela Inhub) 250-50 mcg/dose inhaler, Inhale 1 puff 2 (two) times a day Rinse mouth after use , Disp: 60 blister, Rfl: 3  •  glucose blood (OneTouch Verio) test strip, Use 1 each 4 (four) times a day Use as instructed, Disp: 400 strip, Rfl: 1  •  insulin glargine (Toujeo SoloStar) 300 units/mL CONCENTRATED U-300 injection pen (1-unit dial), Inject 25 Units under the skin every 12 (twelve) hours, Disp: , Rfl:   •  insulin lispro (HumaLOG KwikPen) 100 units/mL injection pen, Use 10 units prior to each meal +1 unit per 50 above 150 mg/dL, Disp: 15 mL, Rfl: 2  •  Insulin Pen Needle (BD Pen Needle Pilar 2nd Gen) 32G X 4 MM MISC, For use with insulin pen  Pharmacy may dispense brand covered by insurance , Disp: 100 each, Rfl: 0  •  ipratropium-albuterol (DUO-NEB) 0 5-2 5 mg/3 mL nebulizer solution, Take 3 mL by nebulization 3 (three) times a day, Disp: 270 mL, Rfl: 0  •  Lancets (onetouch ultrasoft) lancets, Use once daily, Disp: 100 each, Rfl: 2  •  metoprolol succinate (TOPROL-XL) 50 mg 24 hr tablet, take 1 tablet by mouth once daily, Disp: 30 tablet, Rfl: 3  •  montelukast (SINGULAIR) 10 mg tablet, Take 10 mg by mouth daily at bedtime, Disp: , Rfl:   •  NovoFine Autocover Pen Needle 30G X 8 MM MISC, USE TWICE A DAY, Disp: 300 each, Rfl: 5  •  nystatin (MYCOSTATIN) powder, Apply topically 2 (two) times a day, Disp: 15 g, Rfl: 0  •  predniSONE 20 mg tablet, Take 2 tablets (40 mg total) by mouth daily for 1 day, THEN 1 5 tablets (30 mg total) daily for 2 days, THEN 1 tablet (20 mg total) daily for 2 days, THEN 0 5 tablets (10 mg total) daily for 2 days   Do not start before March 29, 2023 , Disp: 8 tablet, Rfl: 0  •  pregabalin (LYRICA) 100 mg capsule, Take 100 mg by mouth 2 (two) times a day, Disp: , Rfl:   •  rosuvastatin (CRESTOR) 10 MG tablet, take 1 tablet by mouth once daily, "Disp: 90 tablet, Rfl: 1  •  semaglutide, 1 mg/dose, (Ozempic, 1 MG/DOSE,) 4 mg/3 mL injection pen, Inject 0 75 mL (1 mg total) under the skin every 7 days, Disp: 9 mL, Rfl: 1  •  torsemide (DEMADEX) 20 mg tablet, Take 2 tablets in the AM, Disp: 90 tablet, Rfl: 1  •  Insulin Pen Needle (NovoFine Autocover) 30G X 8 MM MISC, Inject under the skin daily, Disp: 100 each, Rfl: 3  •  Insulin Pen Needle 30G X 8 MM MISC, 2 (two) times a day, Disp: , Rfl:   •  Insulin Pen Needle 31G X 8 MM MISC, Use daily Inject under the skin, Disp: 100 each, Rfl: 2  Allergies   Allergen Reactions   • Penicillins Hives   • Moxifloxacin Other (See Comments)     unknown   • Oxycodone-Acetaminophen Other (See Comments)     GI upset   • Zinc Acetate Other (See Comments)     unknown   • Egg Yolk - Food Allergy Other (See Comments)   • Penicillins Hives   • Asa [Aspirin] GI Intolerance   • Indocin [Indomethacin] Other (See Comments)     Made patient \"loopy\"   • Other Other (See Comments)     unknown   • Tannic Acid Rash       Vitals: Blood pressure 124/82, pulse 78, temperature 97 6 °F (36 4 °C), temperature source Temporal, resp  rate 12, height 5' 2\" (1 575 m), weight 116 kg (256 lb), SpO2 97 %, not currently breastfeeding  Body mass index is 46 82 kg/m²  Oxygen Therapy  SpO2: 97 %  Physical Exam  Physical Exam  Vitals reviewed  Constitutional:       General: She is not in acute distress  Appearance: She is obese  She is not ill-appearing or toxic-appearing  HENT:      Head: Normocephalic and atraumatic  Nose: Congestion present  Mouth/Throat:      Pharynx: Oropharynx is clear  Eyes:      Conjunctiva/sclera: Conjunctivae normal    Cardiovascular:      Rate and Rhythm: Normal rate and regular rhythm  Heart sounds: Normal heart sounds  Pulmonary:      Effort: Pulmonary effort is normal  No tachypnea, accessory muscle usage or respiratory distress        Breath sounds: No stridor, decreased air movement or transmitted " upper airway sounds  No wheezing, rhonchi or rales  Chest:      Chest wall: No tenderness  Abdominal:      Palpations: Abdomen is soft  Musculoskeletal:         General: Normal range of motion  Cervical back: Normal range of motion  Skin:     General: Skin is warm and dry  Neurological:      General: No focal deficit present  Mental Status: She is alert and oriented to person, place, and time  Psychiatric:         Mood and Affect: Mood normal          Behavior: Behavior normal          Labs: I have personally reviewed pertinent lab results  Lab Results   Component Value Date    WBC 10 53 (H) 03/28/2023    HGB 10 4 (L) 03/28/2023    HCT 32 5 (L) 03/28/2023    MCV 93 03/28/2023     03/28/2023     Lab Results   Component Value Date    CALCIUM 8 5 03/28/2023    K 4 7 03/28/2023    CO2 30 03/28/2023     03/28/2023    BUN 56 (H) 03/28/2023    CREATININE 1 54 (H) 03/28/2023     Lab Results   Component Value Date     (H) 03/28/2023     Lab Results   Component Value Date    ALT 12 03/26/2023    AST 15 03/26/2023    ALKPHOS 51 03/26/2023       Imaging and other studies: I have personally reviewed pertinent reports  and I have personally reviewed pertinent films in PACS     3/26/23 CT chest: Multiple lung nodules, unchanged since 3/9/2022   5 mm anterior right apical nodule  5 mm left lower lobe superior segment nodule  3 mm posterior lateral right upper lobe nodule  6 mm anterior right lower lobe nodule  No acute infiltrates  There is no tracheal or endobronchial lesion  POCT Spirometry:  Performed 6/5/2019 and personally interpreted  FEV1/FVC ratio 43%   FEV1 37% predicted  FVC 65% predicted  Severe airway obstruction with low vital capacity  Other Studies: I have personally reviewed pertinent reports  Split sleep study 3/30/23: KATRINA of mild degree with significant oxygen desaturation and sleep fragmentation  AHI pre treatment 10 9, AHI post treatment 2 8   Low oxygen saturation of 86%

## 2023-04-04 NOTE — ASSESSMENT & PLAN NOTE
The patient has been maintained on Breo Ellipta, 1 puff daily  This is too expensive for her  I switched her to Wixela 250-50, 1 puff twice daily  She will rinse her mouth out after each use  She will continue her rescue inhaler as needed  Her nebulizer will be delivered in the next 2-3 business days  She will start duo-neb three times daily  She denies recent exacerbations stating that she believes this last time was due to an infection, not her asthma  She is maintained on montelukast at bedtime  I encouraged loratadine or zyrtect daily was well as her [address] [place] [address] allergy panel is positive for a lot of outdoor/indoor allergens  Her IgE was 665, she qualifies for Xolair but would like to defer injections at this time

## 2023-04-04 NOTE — ASSESSMENT & PLAN NOTE
This has been stable  She is maintained on Breo however this has been changed to Americana for affordability  She has a rescue inhaler that she uses when needed with activity  She will start duo-neb when she receives her nebulizer

## 2023-04-04 NOTE — ASSESSMENT & PLAN NOTE
Split sleep study 3/30/23: KATRINA of mild degree with significant oxygen desaturation and sleep fragmentation  AHI pre treatment 10 9, AHI post treatment 2 8  Low oxygen saturation of 86%  Patient will be started on CPAP with a max pressure of 7 cmH2O and 1L of oxygen bled in   She has been unable to tolerate BiPAP in the past

## 2023-04-04 NOTE — PROGRESS NOTES
Obstructive sleep apnea of mild degree treatable with CPAP 7 cm of water with 1 L of oxygen supplementation  The results discussed with patient  CPAP therapy ordered  The patient has a BiPAP machine at home  Answered all questions

## 2023-04-05 ENCOUNTER — TELEPHONE (OUTPATIENT)
Dept: OBGYN CLINIC | Facility: OTHER | Age: 73
End: 2023-04-05

## 2023-04-05 PROBLEM — N63.15 MASS OVERLAPPING MULTIPLE QUADRANTS OF RIGHT BREAST: Status: ACTIVE | Noted: 2022-04-08

## 2023-04-05 LAB
DME PARACHUTE DELIVERY DATE REQUESTED: NORMAL
DME PARACHUTE DELIVERY NOTE: NORMAL
DME PARACHUTE ITEM DESCRIPTION: NORMAL
DME PARACHUTE ORDER STATUS: NORMAL
DME PARACHUTE SUPPLIER NAME: NORMAL
DME PARACHUTE SUPPLIER PHONE: NORMAL

## 2023-04-05 NOTE — TELEPHONE ENCOUNTER
Appointment Cancellation or Reschedule     Person calling in Patient   If someone other than patient calling, are they listed on the communication consent form? N/A   Provider Dr Astrid Manley   Office Visit Date and Time  4/14/23 1045   Office Visit Location [facility]   Did patient want to reschedule their visit? If so, when was it scheduled to? 4/6/23 1030 [address]  [facility]   Did the patient have STAR scheduled for this appointment? N/A   Does the patient need STAR scheduled for their new appointment? N/A   Is this patient calling to reschedule an infusion appointment? No   When is their next infusion appointment? n/a   Is this patient a Chemo patient? No   Reason for Cancellation or Reschedule Scheduling error   Was the No show policy reviewed with patient if patient is canceling within 24 hours? N/A     If the patient is cancelling an appointment and needs their STAR Transport cancelled, please route to [facility]  If the patient is a treatment patient, please route this to the office nurse  If the patient is not on treatment, please route to the Clerical pool based on location  If the patient is a surgical oncology patient, please route to surg/onc clinical pool  Route message as high priority if same day cancellation

## 2023-04-06 ENCOUNTER — CONSULT (OUTPATIENT)
Dept: SURGICAL ONCOLOGY | Facility: CLINIC | Age: 73
End: 2023-04-06

## 2023-04-06 VITALS
TEMPERATURE: 97.9 F | DIASTOLIC BLOOD PRESSURE: 78 MMHG | WEIGHT: 257 LBS | HEIGHT: 62 IN | SYSTOLIC BLOOD PRESSURE: 132 MMHG | OXYGEN SATURATION: 99 % | HEART RATE: 72 BPM | BODY MASS INDEX: 47.29 KG/M2 | RESPIRATION RATE: 18 BRPM

## 2023-04-06 DIAGNOSIS — N63.15 MASS OVERLAPPING MULTIPLE QUADRANTS OF RIGHT BREAST: Primary | ICD-10-CM

## 2023-04-06 DIAGNOSIS — Z01.818 PRE-OP EXAMINATION: ICD-10-CM

## 2023-04-06 RX ORDER — ACETAMINOPHEN AND CODEINE PHOSPHATE 300; 30 MG/1; MG/1
1 TABLET ORAL EVERY 6 HOURS PRN
Qty: 30 TABLET | Refills: 0 | Status: SHIPPED | OUTPATIENT
Start: 2023-04-06

## 2023-04-06 NOTE — PROGRESS NOTES
Surgical Oncology Consult Note       [facility]  [facility] [place]  [address]  [place] [address] [facility]  1950  [id number]      Chief Complaint   Patient presents with   • Consult     NP ref by PCP for abnormal breast bx  dx right mammo B4 3/9/23 ON ELIQUIS  3/13/23 right breast stereo bx 9:00 middle depth atypical intraductal papillary proliferation w/ central hyalinized stromal core and associated calcs differential diagnosis includes DICS/intraductal papilloma concordant calcs extned a radian for at least 3 6 cm on non magnified view possibly longer than this  suggest bracketing at time of excision  NEEDS KRIS - mild hematoma formed and clip displaced 1 7 cm lateral          Assessment/Plan    1  Mass overlapping multiple quadrants of right breast  -     acetaminophen-codeine (TYLENOL #3) 300-30 mg per tablet; Take 1 tablet by mouth every 6 (six) hours as needed for moderate pain  -     Incentive spirometry; Standing  -     Insert and maintain IV line; Standing  -     Void On-Call to O R ; Standing  -     Place sequential compression device; Standing  -     Case request operating room: RIGHT BREAST KRIS  DIRECTED LUMPECTOMY - KRIS BRACKETTED; Standing  -     CBC and differential; Future  -     Comprehensive metabolic panel; Future  -     vancomycin (VANCOCIN) 1000 mg in sodium chloride 0 9% 250 mL IVPB  -     Case request operating room: RIGHT BREAST KRIS  DIRECTED LUMPECTOMY - KRIS BRACKETTED    2  Pre-op examination  -     CBC and differential; Future  -     Comprehensive metabolic panel; Future         Oncology History    No history exists          This is 82-year-old very pleasant female with multiple medical condition morbid obesity cardiac disorders as well as insulin-dependent diabetic she had an abnormal mammogram right breast followed by biopsy is atypical papillary proliferation high risk lesion area involved is greater than 3 6 cm  This was complicated with hematoma as she is on Eliquis  She has been referred to surgical oncology for excision given high risk lesion  She denies of any breast pain nipple discharge nipple retraction or skin changes        Review of Systems   Constitutional: Negative for chills and fever  HENT: Negative for ear pain and sore throat  Eyes: Negative for pain and visual disturbance  Respiratory: Negative for cough and shortness of breath  Cardiovascular: Negative for chest pain and palpitations  Gastrointestinal: Negative for abdominal pain and vomiting  Genitourinary: Negative for dysuria and hematuria  Musculoskeletal: Negative for arthralgias and back pain  Skin: Negative for color change and rash  Neurological: Negative for seizures and syncope  All other systems reviewed and are negative         Past Medical History:      Patient Active Problem List   Diagnosis   • Moderate persistent asthma with acute exacerbation   • Morbid obesity with BMI of 45 0-49 9, adult ([facility])   • Lower leg edema   • Paroxysmal atrial fibrillation ([facility])   • Mixed hyperlipidemia   • Anemia   • Essential hypertension   • PONV (postoperative nausea and vomiting)   • Gastroesophageal reflux disease   • Nephrolithiasis   • Arthritis   • S/P total knee replacement, right   • On continuous oral anticoagulation - eliquis   • Reactive airway disease with acute exacerbation   • Status post reverse total replacement of left shoulder   • Stage 3 chronic kidney disease ([facility])   • Peripheral venous insufficiency   • Post-herpetic polyneuropathy   • Raynaud's disease   • Lung nodule   • Chronic diastolic heart failure ([facility])   • Multiple pulmonary nodules determined by computed tomography of lung   • Mass overlapping multiple quadrants of right breast   • Pressure injury of deep tissue of sacral region   • Acute on chronic respiratory failure with hypoxia and hypercapnia ([facility])   • Obesity hypoventilation syndrome (HCC)   • KATRINA (obstructive sleep apnea)   • Elevated serum creatinine   • Chronic respiratory failure with hypoxia and hypercapnia (HCC)   • Platelets decreased (HCC)   • PSVT (paroxysmal supraventricular tachycardia) (HCC)   • Rash   • Impaired mobility and ADLs   • Pressure injury of sacral region, stage 2 (HCC)   • Blister of right heel   • Heel blister   • Chronic obstructive pulmonary disease, unspecified COPD type (HCC)   • Fall   • Syncope   • Type 2 diabetes mellitus with hyperglycemia, without long-term current use of insulin (HCC)   • CHF (congestive heart failure) (HCC)   • Atrial fibrillation (HCC)   • CKD (chronic kidney disease)   • Hypertension   • Obstructive sleep apnea   • Malignant neoplasm of overlapping sites of right female breast [facility])   • Abnormal CT scan, chest   • Allergies        Past Medical History:   Diagnosis Date   • SEBASTIAN [facility]acute kidney [facility]) [facility] 1/23/2023   • Anemia    • Asthma    • Chronic kidney disease    • COPD (chronic obstructive pulmonary disease) ([facility] )    • COVID-19 January 2022   • Diabetes mellitus ([facility] )    • GERD (gastroesophageal reflux disease)    • Hyperlipidemia    • Hypertension    • PONV (postoperative nausea and vomiting)    • SVT (supraventricular tachycardia) (HCC)         Past Surgical History:   Procedure Laterality Date   • APPENDECTOMY     • BREAST BIOPSY Right     years ago when she was 23   • BREAST BIOPSY Right 03/13/2023   • CYSTOSCOPY W/ LASER LITHOTRIPSY Left 07/12/2016    Procedure: CYSTOSCOPY URETEROSCOPY WITH LITHOTRIPSY HOLMIUM LASER, RETROGRADE PYELOGRAM AND INSERTION STENT URETERAL;  Surgeon: Tammy Perez MD;  Location: [address] [place] [address];  Service:    • DILATION AND CURETTAGE OF UTERUS     • IR THORACENTESIS  03/18/2022   • JOINT REPLACEMENT      right knee   • KNEE ARTHROPLASTY Right    • MAMMO STEREOTACTIC BREAST BIOPSY RIGHT (ALL INC) Right 03/13/2023    High Risk Lesion   • NJ ARTHROPLASTY GLENOHUMERAL JOINT TOTAL SHOULDER Left 2022    Procedure: ARTHROPLASTY SHOULDER REVERSE;  Surgeon: Gladis Wolfe MD;  Location: [facility];  Service: Orthopedics   • LA CYSTO BLADDER W/URETERAL CATHETERIZATION Left 2016    Procedure: CYSTOSCOPY RETROGRADE PYELOGRAM WITH INSERTION STENT URETERAL, left;  Surgeon: Williams Heredia MD;  Location: [facility];  Service: Urology   • SHOULDER ARTHROTOMY Left         Family History   Problem Relation Age of Onset   • Heart disease Mother    • Diabetes Father    • Thyroid cancer Sister    • Heart disease Brother    • Diabetes Brother    • Heart disease Brother    • Heart disease Brother    • Emphysema Maternal Grandmother    • Heart disease Family         Social History     Socioeconomic History   • Marital status: Single     Spouse name: Not on file   • Number of children: 0   • Years of education: 15   • Highest education level: Associate degree: academic program   Occupational History   • Not on file   Tobacco Use   • Smoking status: Former     Packs/day: 1      Years:  00     Pack years: 20      Types: Cigarettes     Quit date: 1996     Years since quittin 7   • Smokeless tobacco: Never   Vaping Use   • Vaping Use: Never used   Substance and Sexual Activity   • Alcohol use: Not Currently     Comment: rarely   • Drug use: Never   • Sexual activity: Not Currently   Other Topics Concern   • Not on file   Social History Narrative    ** Merged History Encounter **          Social Determinants of Health     Financial Resource Strain: Not on file   Food Insecurity: No Food Insecurity   • Worried About Running Out of Food in the Last Year: Never true   • Ran Out of Food in the Last Year: Never true   Transportation Needs: No Transportation Needs   • Lack of Transportation (Medical): No   • Lack of Transportation (Non-Medical):  No   Physical Activity: Not on file   Stress: Not on file   Social Connections: Not on file   Intimate Partner Violence: Not on file   Housing Stability: Low Risk    • Unable to Pay for Housing in the Last Year: No   • Number of Places Lived in the Last Year: 2   • Unstable Housing in the Last Year: No        Current Outpatient Medications:   •  acetaminophen-codeine (TYLENOL #3) 300-30 mg per tablet, Take 1 tablet by mouth every 6 (six) hours as needed for moderate pain, Disp: 30 tablet, Rfl: 0  •  albuterol (PROVENTIL HFA,VENTOLIN HFA) 90 mcg/act inhaler, Inhale 2 puffs every 6 (six) hours as needed for wheezing, Disp: 8 g, Rfl: 4  •  ammonium lactate (LAC-HYDRIN) 12 % lotion, APPLY TOPICALLY TO AFFECTED AREAS twice a day, Disp: , Rfl:   •  apixaban (ELIQUIS) 5 mg, Take 5 mg by mouth 2 (two) times a day, Disp: , Rfl:   •  B Complex-C-Folic Acid (triphrocaps) 1 MG CAPS, take 1 capsule by mouth once daily, Disp: 30 capsule, Rfl: 5  •  docusate sodium (COLACE) 100 mg capsule, Take 100 mg by mouth in the morning, Disp: , Rfl:   •  fluticasone (FLONASE) 50 mcg/act nasal spray, 1 spray into each nostril daily, Disp: , Rfl: 0  •  Fluticasone-Salmeterol (Wixela Inhub) 250-50 mcg/dose inhaler, Inhale 1 puff 2 (two) times a day Rinse mouth after use , Disp: 60 blister, Rfl: 3  •  glucose blood (OneTouch Verio) test strip, Use 1 each 4 (four) times a day Use as instructed, Disp: 400 strip, Rfl: 1  •  insulin glargine (Toujeo SoloStar) 300 units/mL CONCENTRATED U-300 injection pen (1-unit dial), Inject 25 Units under the skin every 12 (twelve) hours, Disp: , Rfl:   •  insulin lispro (HumaLOG KwikPen) 100 units/mL injection pen, Use 10 units prior to each meal +1 unit per 50 above 150 mg/dL, Disp: 15 mL, Rfl: 2  •  Insulin Pen Needle (BD Pen Needle Pilar 2nd Gen) 32G X 4 MM MISC, For use with insulin pen   Pharmacy may dispense brand covered by insurance , Disp: 100 each, Rfl: 0  •  Insulin Pen Needle (NovoFine Autocover) 30G X 8 MM MISC, Inject under the skin daily, Disp: 100 each, Rfl: 3  •  Insulin Pen Needle 30G X 8 MM MISC, 2 (two) times a day, Disp: , Rfl:   • "ipratropium-albuterol (DUO-NEB) 0 5-2 5 mg/3 mL nebulizer solution, Take 3 mL by nebulization 3 (three) times a day, Disp: 270 mL, Rfl: 0  •  Lancets (onetouch ultrasoft) lancets, Use once daily, Disp: 100 each, Rfl: 2  •  metoprolol succinate (TOPROL-XL) 50 mg 24 hr tablet, take 1 tablet by mouth once daily, Disp: 30 tablet, Rfl: 3  •  montelukast (SINGULAIR) 10 mg tablet, Take 10 mg by mouth daily at bedtime, Disp: , Rfl:   •  NovoFine Autocover Pen Needle 30G X 8 MM MISC, USE TWICE A DAY, Disp: 300 each, Rfl: 5  •  nystatin (MYCOSTATIN) powder, Apply topically 2 (two) times a day, Disp: 15 g, Rfl: 0  •  pregabalin (LYRICA) 100 mg capsule, Take 100 mg by mouth 2 (two) times a day, Disp: , Rfl:   •  rosuvastatin (CRESTOR) 10 MG tablet, take 1 tablet by mouth once daily, Disp: 90 tablet, Rfl: 1  •  semaglutide, 1 mg/dose, (Ozempic, 1 MG/DOSE,) 4 mg/3 mL injection pen, Inject 0 75 mL (1 mg total) under the skin every 7 days, Disp: 9 mL, Rfl: 1  •  torsemide (DEMADEX) 20 mg tablet, Take 2 tablets in the AM, Disp: 90 tablet, Rfl: 1  •  Continuous Blood Gluc  (FreeStyle Dawn 2 [place]) NORM, Use 1 Units continuous (Patient not taking: Reported on 4/6/2023), Disp: 1 each, Rfl: 0  •  Continuous Blood Gluc Sensor (FreeStyle Dawn 2 Sensor) MISC, Use 1 Units every 14 (fourteen) days (Patient not taking: Reported on 4/6/2023), Disp: 2 each, Rfl: 5  •  Insulin Pen Needle 31G X 8 MM MISC, Use daily Inject under the skin, Disp: 100 each, Rfl: 2     Allergies   Allergen Reactions   • Penicillins Hives   • Moxifloxacin Other (See Comments)     unknown   • Oxycodone-Acetaminophen Other (See Comments)     GI upset   • Zinc Acetate Other (See Comments)     unknown   • Egg Yolk - Food Allergy Other (See Comments)   • Penicillins Hives   • Asa [Aspirin] GI Intolerance   • Indocin [Indomethacin] Other (See Comments)     Made patient \"loopy\"   • Other Other (See Comments)     unknown   • Tannic Acid Rash       Physical Exam: " Vitals:    04/06/23 1030   BP: 132/78   Pulse: 72   Resp: 18   Temp: 97 9 °F (36 6 °C)   SpO2: 99%     Physical Exam  Constitutional:       Appearance: Normal appearance  She is obese  HENT:      Head: Normocephalic and atraumatic  Nose: Nose normal       Mouth/Throat:      Mouth: Mucous membranes are moist    Eyes:      Pupils: Pupils are equal, round, and reactive to light  Cardiovascular:      Rate and Rhythm: Normal rate  Pulses: Normal pulses  Heart sounds: Normal heart sounds  Pulmonary:      Effort: Pulmonary effort is normal       Breath sounds: Normal breath sounds  Chest:      Comments: Palpable hematoma the right breast lower outer quadrant where the biopsy site she has bilateral large pendulous breast no other palpable mass or masses on either breast no nipple discharge no nipple retraction or skin changes she has a very dry skin  Bilateral axillary and supraclavicular examination no palpable adenopathy  Abdominal:      General: Bowel sounds are normal       Palpations: Abdomen is soft  Musculoskeletal:         General: Normal range of motion  Cervical back: Normal range of motion and neck supple  Skin:     General: Skin is warm  Neurological:      General: No focal deficit present  Mental Status: She is alert and oriented to person, place, and time  Psychiatric:         Mood and Affect: Mood normal          Behavior: Behavior normal          Thought Content: Thought content normal          Judgment: Judgment normal          Results:       Breast, Right, right breast calcs   9:00:  -Fragments of Atypical intraductal papillary proliferation with central hyalinized stromal core and associated calcification      Comment  The differential diagnosis includes  ductal carcinoma in situ /DCIS arising in the background of intraductal papilloma vs  intraductal papilloma with atypical ductal hyperplasia   Further characterization will be performed on the resection specimen         Discussion/Summary:   Is a 74-year-old female with multiple medical condition with 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  She has been referred to us for [facility] bracketed resection  We did discuss pathophysiology and pathogenesis of breast disorders including ductal carcinoma in situ  area involved greater than 3 6 cm  Therefore we will do Evelyn bracketed lumpectomy she understand based on final pathology she may need additional procedures as well as treatments  Consent was obtained after explained the benefit procedure alternative possible complications such as bleeding nonhealing wound chronic pain and cosmetic concern  She is high risk for wound complication given she has uncontrolled diabetes  Patient's and her niece questions are answered we will arrange surgery as soon as possible  We will obtain cardiology evaluation prior to surgery  Advance Care Planning/Advance Directives: Ngozi Mcconnell MD discussed the disease status, and treatment plans with Anayeli Gomes today 04/06/23 and will follow-up with the patient

## 2023-04-07 ENCOUNTER — PATIENT OUTREACH (OUTPATIENT)
Dept: INTERNAL MEDICINE CLINIC | Facility: CLINIC | Age: 73
End: 2023-04-07

## 2023-04-07 ENCOUNTER — DOCUMENTATION (OUTPATIENT)
Dept: HEMATOLOGY ONCOLOGY | Facility: MEDICAL CENTER | Age: 73
End: 2023-04-07

## 2023-04-07 ENCOUNTER — TELEPHONE (OUTPATIENT)
Dept: SURGICAL ONCOLOGY | Facility: CLINIC | Age: 73
End: 2023-04-07

## 2023-04-07 NOTE — TELEPHONE ENCOUNTER
Néstor will have a Right Breast Lumpectomy by Dr Poornima Hernandes on May 2nd  Cardiac clearance is needed  She has an appointment with Dr Ellen James on 4/18  Clearance form has been faxed to [phone]

## 2023-04-07 NOTE — PROGRESS NOTES
Chart was reviewed and this RNCM called patient for follow up  Patient states she is feeling so much better and is feeling almost back to normal  She states she has more energy and is breathing better  She states she is ambulating independently without any assistive devices and has been active  She states with the nicer weather she has been outside and walking around the yard and playing some with her great nieces ages 11,3 and 1  Patient states she was at the [address] [facility] yesterday and has surgery scheduled for 5/2/23  She has an apt with her Cardiologist on 4/18/23 for cardiac clearance  Patient was seen by her Pulmonary doctor on 4/4/23  They changed her Breo Ellipta to a cheaper more affordable medication Wixela  Patient states she has this medication and will be starting it tomorrow because her last dose of the Romelle Nest is today  Patient states she is still taking her Ozempic  She states that their is no cheaper alternative per Endocrinology  Patient states they did receive the e-mailed financial assistance information from our  [employer]  She states she is over income for any assistance which she thought she would be  The OOP cost with her insurance is $150 00/month  she states she is able to afford this  She states even if there was a cheaper medication she would like to stay on the Ozempic because it is really working well for her  Patient's A1C was 8 1 on 3/25/23 and she wants to work on bringing that down  Pulmonary changed her C-pap settings to   7 cmH2O with 1L O2  She has been unable to tolerate Bi-pap in the past  She states that [employer] is supposed to come out and adjust the C-pap for her but she has not heard from them yet  I told her I would call them and have them call her  Patient states she has the Nebulizer that was ordered and obtained the Duoneb medication as well  She states she is taking it as directed 3x/day   Patient states she has all her other medications and is taking them as directed as well  She states her niece Yomaira Parrish is wonderful and takes good care of her and makes sure she has everything organized and in place

## 2023-04-07 NOTE — PROGRESS NOTES
"[place] order for new CPAP 7max/4min with 1L O2 bleed-in submitted to Yandy Lau 4/5/2023 8:39 AM  Order accepted and commented \"order pending further review\" 4/5/2023 10:46 AM  I called and spoke with [place]  She states the order is voided because patient's secondary insurance is invalid  I called and spoke with patient who states she pays monthly for [employer] secondary and it should not be invalid    "

## 2023-04-07 NOTE — PROGRESS NOTES
This RNCM received IB message from Cheyenne Lezama MA at Pulmonary office  She states that c-pap setting order w/O2 is in [place] and pending due to secondary insurance as stated in my last note  She spoke with patient and patient states that she is still paying for her secondary insurance and it is active  Al Benjamin states she will be following up on Monday and will be calling patients insurance company  RNCM to follow

## 2023-04-07 NOTE — PROGRESS NOTES
This RNCM called [facility] and spoke with a Sonido Palma in the C-pap/B-pap department  She transferred me too a Francis Garcia who states she does not work with C-pap's  I was then transferred to an Kyle Thomas who checked the computer and states that they do not have an order for the new settings or O2 but they have an order for a new C-pap machine which she qualifies for but states her secondary insurance is not active  This RNCM sent an IB to patient Pulmonary office-Heriberto BECKMAN to make her aware of the above and requested a Rx for new settings be sent to [facility]  This RNCM called Christina GOMEZ  At Dr Deven Massey office and made her aware of the above and conversation with patient  RNCM to follow  RNCM to follow

## 2023-04-07 NOTE — PROGRESS NOTES
Spoke with patient today and offered a hospital follow up with Dr Natan Arzate, however, she is having breast surgery on 5/2/23 and wants to be seen a few weeks after that

## 2023-04-10 NOTE — PROGRESS NOTES
I spoke with Kathryn Grace at [place] who confirmed patient's supplemental is currently active and has been since 04/01/2017  I documented this in the [place] order and asked them again to please reach out to patient for set up   I will also call the patient to inform

## 2023-04-11 PROBLEM — R92.8 ABNORMAL MAMMOGRAM: Status: ACTIVE | Noted: 2023-04-11

## 2023-04-22 DIAGNOSIS — G62.9 NEUROPATHY: Primary | ICD-10-CM

## 2023-04-24 ENCOUNTER — PATIENT OUTREACH (OUTPATIENT)
Dept: INTERNAL MEDICINE CLINIC | Facility: CLINIC | Age: 73
End: 2023-04-24

## 2023-04-24 ENCOUNTER — HOSPITAL ENCOUNTER (OUTPATIENT)
Dept: RADIOLOGY | Facility: HOSPITAL | Age: 73
Discharge: HOME | End: 2023-04-24

## 2023-04-24 VITALS — DIASTOLIC BLOOD PRESSURE: 76 MMHG | SYSTOLIC BLOOD PRESSURE: 124 MMHG

## 2023-04-24 DIAGNOSIS — N60.91 ATYPICAL HYPERPLASIA OF RIGHT BREAST: ICD-10-CM

## 2023-04-24 RX ORDER — PREGABALIN 100 MG/1
CAPSULE ORAL
Qty: 180 CAPSULE | Refills: 1 | Status: SHIPPED | OUTPATIENT
Start: 2023-04-24

## 2023-04-24 RX ORDER — LIDOCAINE HYDROCHLORIDE 10 MG/ML
5 INJECTION, SOLUTION EPIDURAL; INFILTRATION; INTRACAUDAL; PERINEURAL ONCE
Status: COMPLETED | OUTPATIENT
Start: 2023-04-24 | End: 2023-04-24

## 2023-04-24 RX ADMIN — LIDOCAINE HYDROCHLORIDE 10 ML: 10 INJECTION, SOLUTION EPIDURAL; INFILTRATION; INTRACAUDAL; PERINEURAL at 08:29

## 2023-04-24 NOTE — PROGRESS NOTES
Ice pack given:    __x___ yes _____ no  *Ice pack provided to patient prior to discharge home, and patient educated on importance of use to help reduce risk for post-procedural bruising, bleeding, or hematoma development      Discharge instructions given to patient:    __x___ yes _____ no      Discharged via:    __x___ ambulatory    _____ wheelchair    _____ stretcher      Stable on discharge:    __x___ yes _____ no

## 2023-04-24 NOTE — PROGRESS NOTES
Patient arrived via:    __x__ ambulatory    _____ wheelchair    _____ stretcher      Domestic violence screen    ___x___ negative______ positive      Breast Implants:    ______ yes ___x___ no

## 2023-04-24 NOTE — PROGRESS NOTES
Chart was reviewed and this RNCM called patient for follow up  Patient states she spoke with someone at [address] [facility] regarding the need for them to change the new order for her C-pap settings and they told her to come out to there facility to  the c-pap  She states she already has the C-pap and does not understand why she would need a new C-pap  She states she is unable to get out to this  I told her I would call the company  This RNCM called [employer] and spoke with Kb Quintana  He looked into her case and states he will start working on it and get some one out to the house  I confirmed he has the correct address and contact number  Patient declined any other needs at this time  RNCM to follow

## 2023-04-24 NOTE — PROGRESS NOTES
Procedure type:    _____ Ultrasound guided __x___ Stereotactic    Breast:    _____Left Breast  __x___ Right Breast Evelyn  reflector placement x2, Bracketed    Time-out called @ 8:28am and procedure confirmed with patient Zuleyma Gallegos, myself Erasto Mahan RN, Mikaela Montano MD, and Sigrid Portillo RT(R)(M)(DS)     Location: Right Breast 9 o'clock position [facility] reflectors placed at most anterior area of calcifications, as well as most posterior area of calcifications to bracket the entire span of atypical tissue with micro-calcifications)     Needle: 16G x 7 5cm Evelyn Needle x2    Clip: Evelyn  reflector x2    Anteriorly placed Jace-Perry activity was audibly confirmed with hand piece device @ 8:37am, and posteriorly placed Jace-Perry activity audibly confirmed with hand piece device @ 9:20am respectively by Dr Aide Haynes  Performed by: Mikaela Montano MD    Pressure held for 5 minutes by: Erasto Mahan RN    Steri Strips:    __x___ yes _____ no    Band aid:    _____ yes __x___ no  *Gauze and tape in place above breast incisions      Tolerated procedure:    __x___ yes _____ no

## 2023-04-24 NOTE — DISCHARGE INSTR - OTHER ORDERS
POST  BREAST PROCEDURE PATIENT INFORMATION      Place an ice pack inside your bra over the top of the dressing every hour for 20 minutes (20 minutes on, 60 minutes off)  Do this until bedtime  Do not shower or bathe until the following morning  You may bathe your breast carefully with the steri-strips in place  Be careful    Not to loosen them  The steri-strips will fall off in 3-5 days  You may have mild discomfort, and you may have some bruising where the   Needle entered the skin  This should clear within 5-7 days  If you need medicine for discomfort, take acetaminophen products such as   Tylenol  You may also take Advil or Motrin products  Do not participate in strenuous activities such as-tennis, aerobics, skiing,  Weight lifting, etc  for 24 hours  Refrain from swimming/soaking for 72 hours  Wearing a bra for sleeping may be more comfortable for the first 24-48 hours  Watch for continued bleeding, pain or fever over 101  If any of these symptoms occur, please contact our    breast nurse navigator at the location where your biopsy was performed  During normal business hours (7:30 am-4:00 pm) please call the nurse navigator at the site where your   procedure was performed:    [facility], [address]:  [phone], [phone] or [address] [facility]:     [phone] or [address] [facility]:    [phone] or [phone]  [facility]:   [phone] or [phone]  [facility]:   [address] [place] [address]:     [phone]              After 4 PM - please call your physician or go to the nearest Emergency Department location  9          The final results of your biopsy are usually available within one week

## 2023-04-25 ENCOUNTER — TELEPHONE (OUTPATIENT)
Dept: RADIOLOGY | Facility: HOSPITAL | Age: 73
End: 2023-04-25

## 2023-04-25 RX ORDER — ALBUTEROL SULFATE 90 UG/1
2 AEROSOL, METERED RESPIRATORY (INHALATION) EVERY 6 HOURS PRN
COMMUNITY

## 2023-04-25 RX ORDER — ALBUTEROL SULFATE 2.5 MG/3ML
2.5 SOLUTION RESPIRATORY (INHALATION) EVERY 6 HOURS PRN
COMMUNITY
End: 2023-04-30

## 2023-04-25 NOTE — PROGRESS NOTES
Post-procedure call completed: Tuesday 04/25/2023    Bleeding: _____yes __x__ no    Pain: ___x__ yes ______ no    *Patient reports mild pain post-procedure  She reports using the ice pack Monday evening 04/24/2023 to help with pain and risk reduction of post-procedural bruising/bleeding/hematoma  She has taken  additional tylenol to alleviate some of her breast discomfort  Gar [place] is aware she may continue to use OTC pain control medications as needed (tylenol is typically safest to use prior to upcoming surgery, but please defer to any specific recommendations given to you by Dr Abhay Drake office prior to next week's breast surgery at [place] [address] on 05/02/2023)  Redness/Swelling: ______ yes ___x___ no    Band aid removed: _____ yes ___x__no    *Patient instructed, when she takes her shower later this morning Tuesday 04/25/2023 or Wednesday morning 04/26/2023 to remove gauze and tape at that time, and allow warm water and soap to rinse over the breast in the shower  She is aware not to scrub near incision sites, and gently pat dry once finished showering  She is aware to avoid soaking to the breast, swimming, or tub baths until breast incisions are fully healed  Steri-Strips intact: ___x___ yes _____ no    *Patient instructed to leave steri-strips in place until at least Friday 04/28/2023  If steri-strips do not fall off on their own at that time, it would be appropriate to remove  Patient with no additional questions at this time  Patient does have my name & office phone number if she has any questions or concerns in the interim of time until her upcoming breast surgery with Dr Cal Reyna on 05/02/2023

## 2023-04-25 NOTE — PRE-PROCEDURE INSTRUCTIONS
Pre-Surgery Instructions:   Medication Instructions   • albuterol (PROVENTIL HFA,VENTOLIN HFA) 90 mcg/act inhaler Uses PRN- OK to take day of surgery   • Albuterol Sulfate (albuterol, FOR EMS ONLY,) (2 5 mg/3 mL) 0 083 % nebulizer solution Uses PRN- OK to take day of surgery   • ammonium lactate (LAC-HYDRIN) 12 % lotion Hold day of surgery  • apixaban (ELIQUIS) 5 mg Last dose 4/29   • B Complex-C-Folic Acid (triphrocaps) 1 MG CAPS Stop taking 7 days prior to surgery  • docusate sodium (COLACE) 100 mg capsule Hold day of surgery  • fluticasone (FLONASE) 50 mcg/act nasal spray Uses PRN- OK to take day of surgery   • Fluticasone-Salmeterol (Wixela Inhub) 250-50 mcg/dose inhaler Uses PRN- OK to take day of surgery   • insulin glargine (Toujeo SoloStar) 300 units/mL CONCENTRATED U-300 injection pen (1-unit dial) Take day of surgery  • insulin lispro (HumaLOG KwikPen) 100 units/mL injection pen Hold day of surgery  • metoprolol succinate (TOPROL-XL) 50 mg 24 hr tablet Take day of surgery   • montelukast (SINGULAIR) 10 mg tablet Take night before surgery   • nystatin (MYCOSTATIN) powder Hold day of surgery  • pregabalin (LYRICA) 100 mg capsule Take day of surgery  • rosuvastatin (CRESTOR) 10 MG tablet Take day of surgery  • semaglutide, 1 mg/dose, (Ozempic, 1 MG/DOSE,) 4 mg/3 mL injection pen Weekly   • torsemide (DEMADEX) 20 mg tablet Hold day of surgery  Spoke with pt and niece via phone  Advised hospital location will call with arrival time night before surgery  NPO after midnight the night before surgery, including chewing gum and hard candy  A small sip of water is allowed to take approved medications the morning of surgery  Instructed to leave contacts/jewelery/valuables at home  Ok to wear dentures, glasses and hearing aides into the hospital - they will be removed for surgery  No smoking or alcohol consumption 24 hours prior to surgery      Avoid all Aspirin products and OTC Vit/Supp 1 week prior to surgery and avoid NSAIDs 3 days prior to surgery per anesthesia instructions  Tylenol ok to take prn  Showering instructions reviewed for night before and morning of surgery using surgical soap or antibacterial soap  Patient verbalized understanding of all of the above, including medication instructions

## 2023-04-26 LAB
DME PARACHUTE DELIVERY DATE ACTUAL: NORMAL
DME PARACHUTE DELIVERY DATE EXPECTED: NORMAL
DME PARACHUTE DELIVERY DATE REQUESTED: NORMAL
DME PARACHUTE DELIVERY NOTE: NORMAL
DME PARACHUTE ITEM DESCRIPTION: NORMAL
DME PARACHUTE ORDER STATUS: NORMAL
DME PARACHUTE SUPPLIER NAME: NORMAL
DME PARACHUTE SUPPLIER PHONE: NORMAL

## 2023-04-27 ENCOUNTER — PREP FOR PROCEDURE (OUTPATIENT)
Dept: SURGICAL ONCOLOGY | Facility: CLINIC | Age: 73
End: 2023-04-27

## 2023-04-28 ENCOUNTER — APPOINTMENT (EMERGENCY)
Dept: RADIOLOGY | Facility: HOSPITAL | Age: 73
End: 2023-04-28

## 2023-04-28 ENCOUNTER — HOSPITAL ENCOUNTER (INPATIENT)
Facility: HOSPITAL | Age: 73
LOS: 1 days | Discharge: HOME | End: 2023-04-30
Attending: EMERGENCY MEDICINE | Admitting: INTERNAL MEDICINE

## 2023-04-28 ENCOUNTER — OFFICE VISIT (OUTPATIENT)
Dept: INTERNAL MEDICINE CLINIC | Facility: CLINIC | Age: 73
End: 2023-04-28

## 2023-04-28 VITALS
WEIGHT: 259 LBS | HEART RATE: 84 BPM | SYSTOLIC BLOOD PRESSURE: 120 MMHG | HEIGHT: 62 IN | OXYGEN SATURATION: 96 % | DIASTOLIC BLOOD PRESSURE: 70 MMHG | BODY MASS INDEX: 47.66 KG/M2

## 2023-04-28 DIAGNOSIS — I47.1 PSVT (PAROXYSMAL SUPRAVENTRICULAR TACHYCARDIA) (HCC): ICD-10-CM

## 2023-04-28 DIAGNOSIS — J96.11 CHRONIC RESPIRATORY FAILURE WITH HYPOXIA AND HYPERCAPNIA (HCC): Chronic | ICD-10-CM

## 2023-04-28 DIAGNOSIS — J45.41 MODERATE PERSISTENT ASTHMA WITH ACUTE EXACERBATION: Primary | ICD-10-CM

## 2023-04-28 DIAGNOSIS — I31.39 PERICARDIAL EFFUSION WITHOUT CARDIAC TAMPONADE: ICD-10-CM

## 2023-04-28 DIAGNOSIS — G47.33 OSA (OBSTRUCTIVE SLEEP APNEA): ICD-10-CM

## 2023-04-28 DIAGNOSIS — J96.21 ACUTE ON CHRONIC RESPIRATORY FAILURE WITH HYPOXIA AND HYPERCAPNIA (HCC): ICD-10-CM

## 2023-04-28 DIAGNOSIS — I87.2 PERIPHERAL VENOUS INSUFFICIENCY: ICD-10-CM

## 2023-04-28 DIAGNOSIS — J96.22 ACUTE ON CHRONIC RESPIRATORY FAILURE WITH HYPOXIA AND HYPERCAPNIA (HCC): ICD-10-CM

## 2023-04-28 DIAGNOSIS — J44.9 CHRONIC OBSTRUCTIVE PULMONARY DISEASE, UNSPECIFIED COPD TYPE (HCC): ICD-10-CM

## 2023-04-28 DIAGNOSIS — R91.1 LUNG NODULE: ICD-10-CM

## 2023-04-28 DIAGNOSIS — R91.8 PULMONARY NODULES: ICD-10-CM

## 2023-04-28 DIAGNOSIS — R91.8 MULTIPLE PULMONARY NODULES DETERMINED BY COMPUTED TOMOGRAPHY OF LUNG: ICD-10-CM

## 2023-04-28 DIAGNOSIS — I48.0 PAROXYSMAL ATRIAL FIBRILLATION (HCC): ICD-10-CM

## 2023-04-28 DIAGNOSIS — J44.1 COPD WITH ACUTE EXACERBATION (HCC): Primary | ICD-10-CM

## 2023-04-28 DIAGNOSIS — J96.12 CHRONIC RESPIRATORY FAILURE WITH HYPOXIA AND HYPERCAPNIA (HCC): Chronic | ICD-10-CM

## 2023-04-28 DIAGNOSIS — J45.909 ASTHMA: ICD-10-CM

## 2023-04-28 LAB
2HR DELTA HS TROPONIN: -2 NG/L
ALBUMIN SERPL BCP-MCNC: 3.8 G/DL (ref 3.5–5)
ALP SERPL-CCNC: 57 U/L (ref 34–104)
ALT SERPL W P-5'-P-CCNC: 15 U/L (ref 7–52)
ANION GAP SERPL CALCULATED.3IONS-SCNC: 11 MMOL/L (ref 4–13)
ARTERIAL PATENCY WRIST A: YES
AST SERPL W P-5'-P-CCNC: 18 U/L (ref 13–39)
BASE EXCESS BLDA CALC-SCNC: 5.3 MMOL/L
BASOPHILS # BLD AUTO: 0.08 THOUSANDS/ΜL (ref 0–0.1)
BASOPHILS NFR BLD AUTO: 1 % (ref 0–1)
BILIRUB SERPL-MCNC: 0.3 MG/DL (ref 0.2–1)
BNP SERPL-MCNC: 24 PG/ML (ref 0–100)
BODY TEMPERATURE: 98.9 DEGREES FEHRENHEIT
BUN SERPL-MCNC: 39 MG/DL (ref 5–25)
CALCIUM SERPL-MCNC: 8.8 MG/DL (ref 8.4–10.2)
CARDIAC TROPONIN I PNL SERPL HS: 5 NG/L
CARDIAC TROPONIN I PNL SERPL HS: 7 NG/L
CHLORIDE SERPL-SCNC: 98 MMOL/L (ref 96–108)
CO2 SERPL-SCNC: 32 MMOL/L (ref 21–32)
CREAT SERPL-MCNC: 1.79 MG/DL (ref 0.6–1.3)
EOSINOPHIL # BLD AUTO: 0.73 THOUSAND/ΜL (ref 0–0.61)
EOSINOPHIL NFR BLD AUTO: 8 % (ref 0–6)
ERYTHROCYTE [DISTWIDTH] IN BLOOD BY AUTOMATED COUNT: 14.3 % (ref 11.6–15.1)
GFR SERPL CREATININE-BSD FRML MDRD: 28 ML/MIN/1.73SQ M
GLUCOSE SERPL-MCNC: 224 MG/DL (ref 65–140)
GLUCOSE SERPL-MCNC: 394 MG/DL (ref 65–140)
GLUCOSE SERPL-MCNC: 448 MG/DL (ref 65–140)
HCO3 BLDA-SCNC: 30.1 MMOL/L (ref 22–28)
HCT VFR BLD AUTO: 35.5 % (ref 34.8–46.1)
HGB BLD-MCNC: 11.6 G/DL (ref 11.5–15.4)
IMM GRANULOCYTES # BLD AUTO: 0.04 THOUSAND/UL (ref 0–0.2)
IMM GRANULOCYTES NFR BLD AUTO: 0 % (ref 0–2)
LYMPHOCYTES # BLD AUTO: 2.17 THOUSANDS/ΜL (ref 0.6–4.47)
LYMPHOCYTES NFR BLD AUTO: 22 % (ref 14–44)
MAGNESIUM SERPL-MCNC: 1.6 MG/DL (ref 1.9–2.7)
MCH RBC QN AUTO: 30 PG (ref 26.8–34.3)
MCHC RBC AUTO-ENTMCNC: 32.7 G/DL (ref 31.4–37.4)
MCV RBC AUTO: 92 FL (ref 82–98)
MONOCYTES # BLD AUTO: 1.07 THOUSAND/ΜL (ref 0.17–1.22)
MONOCYTES NFR BLD AUTO: 11 % (ref 4–12)
NEUTROPHILS # BLD AUTO: 5.61 THOUSANDS/ΜL (ref 1.85–7.62)
NEUTS SEG NFR BLD AUTO: 58 % (ref 43–75)
NRBC BLD AUTO-RTO: 0 /100 WBCS
O2 CT BLDA-SCNC: 16.3 ML/DL (ref 16–23)
OXYHGB MFR BLDA: 98.6 % (ref 94–97)
PCO2 BLDA: 45.3 MM HG (ref 36–44)
PCO2 TEMP ADJ BLDA: 45.7 MM HG (ref 36–44)
PH BLD: 7.44 [PH] (ref 7.35–7.45)
PH BLDA: 7.44 [PH] (ref 7.35–7.45)
PLATELET # BLD AUTO: 226 THOUSANDS/UL (ref 149–390)
PMV BLD AUTO: 10.6 FL (ref 8.9–12.7)
PO2 BLD: 165.4 MM HG (ref 75–129)
PO2 BLDA: 164.3 MM HG (ref 75–129)
POTASSIUM SERPL-SCNC: 3.8 MMOL/L (ref 3.5–5.3)
PROCALCITONIN SERPL-MCNC: 0.06 NG/ML
PROT SERPL-MCNC: 7 G/DL (ref 6.4–8.4)
RBC # BLD AUTO: 3.87 MILLION/UL (ref 3.81–5.12)
SARS-COV-2 RNA RESP QL NAA+PROBE: NEGATIVE
SODIUM SERPL-SCNC: 141 MMOL/L (ref 135–147)
SPECIMEN SOURCE: ABNORMAL
WBC # BLD AUTO: 9.7 THOUSAND/UL (ref 4.31–10.16)

## 2023-04-28 RX ORDER — MAGNESIUM SULFATE HEPTAHYDRATE 40 MG/ML
2 INJECTION, SOLUTION INTRAVENOUS ONCE
Status: COMPLETED | OUTPATIENT
Start: 2023-04-28 | End: 2023-04-28

## 2023-04-28 RX ORDER — METHYLPREDNISOLONE SODIUM SUCCINATE 40 MG/ML
40 INJECTION, POWDER, LYOPHILIZED, FOR SOLUTION INTRAMUSCULAR; INTRAVENOUS EVERY 8 HOURS
Status: DISCONTINUED | OUTPATIENT
Start: 2023-04-28 | End: 2023-04-28

## 2023-04-28 RX ORDER — INSULIN GLARGINE 100 [IU]/ML
28 INJECTION, SOLUTION SUBCUTANEOUS EVERY 12 HOURS SCHEDULED
Status: DISCONTINUED | OUTPATIENT
Start: 2023-04-28 | End: 2023-04-29

## 2023-04-28 RX ORDER — MONTELUKAST SODIUM 10 MG/1
10 TABLET ORAL DAILY
Status: DISCONTINUED | OUTPATIENT
Start: 2023-04-29 | End: 2023-04-30 | Stop reason: HOSPADM

## 2023-04-28 RX ORDER — FLUTICASONE FUROATE AND VILANTEROL 100; 25 UG/1; UG/1
1 POWDER RESPIRATORY (INHALATION)
Status: DISCONTINUED | OUTPATIENT
Start: 2023-04-29 | End: 2023-04-30 | Stop reason: HOSPADM

## 2023-04-28 RX ORDER — FLUTICASONE PROPIONATE 50 MCG
1 SPRAY, SUSPENSION (ML) NASAL DAILY
Status: DISCONTINUED | OUTPATIENT
Start: 2023-04-29 | End: 2023-04-30 | Stop reason: HOSPADM

## 2023-04-28 RX ORDER — CALCIUM CARBONATE 200(500)MG
1000 TABLET,CHEWABLE ORAL 2 TIMES DAILY PRN
Status: DISCONTINUED | OUTPATIENT
Start: 2023-04-28 | End: 2023-04-30 | Stop reason: HOSPADM

## 2023-04-28 RX ORDER — METHYLPREDNISOLONE SODIUM SUCCINATE 40 MG/ML
40 INJECTION, POWDER, LYOPHILIZED, FOR SOLUTION INTRAMUSCULAR; INTRAVENOUS EVERY 8 HOURS
Status: DISCONTINUED | OUTPATIENT
Start: 2023-04-29 | End: 2023-04-29

## 2023-04-28 RX ORDER — INSULIN LISPRO 100 [IU]/ML
14 INJECTION, SOLUTION INTRAVENOUS; SUBCUTANEOUS
Status: DISCONTINUED | OUTPATIENT
Start: 2023-04-29 | End: 2023-04-30 | Stop reason: HOSPADM

## 2023-04-28 RX ORDER — TORSEMIDE 20 MG/1
40 TABLET ORAL DAILY
Status: DISCONTINUED | OUTPATIENT
Start: 2023-04-29 | End: 2023-04-30 | Stop reason: HOSPADM

## 2023-04-28 RX ORDER — INSULIN LISPRO 100 [IU]/ML
14 INJECTION, SOLUTION INTRAVENOUS; SUBCUTANEOUS ONCE
Status: COMPLETED | OUTPATIENT
Start: 2023-04-28 | End: 2023-04-28

## 2023-04-28 RX ORDER — INSULIN LISPRO 100 [IU]/ML
1-6 INJECTION, SOLUTION INTRAVENOUS; SUBCUTANEOUS
Status: DISCONTINUED | OUTPATIENT
Start: 2023-04-29 | End: 2023-04-30 | Stop reason: HOSPADM

## 2023-04-28 RX ORDER — INSULIN GLARGINE 100 [IU]/ML
25 INJECTION, SOLUTION SUBCUTANEOUS EVERY 12 HOURS SCHEDULED
Status: DISCONTINUED | OUTPATIENT
Start: 2023-04-28 | End: 2023-04-28

## 2023-04-28 RX ORDER — INSULIN LISPRO 100 [IU]/ML
1-5 INJECTION, SOLUTION INTRAVENOUS; SUBCUTANEOUS
Status: DISCONTINUED | OUTPATIENT
Start: 2023-04-29 | End: 2023-04-30 | Stop reason: HOSPADM

## 2023-04-28 RX ORDER — AZITHROMYCIN 250 MG/1
500 TABLET, FILM COATED ORAL EVERY 24 HOURS
Status: DISCONTINUED | OUTPATIENT
Start: 2023-04-29 | End: 2023-04-30

## 2023-04-28 RX ORDER — PREGABALIN 100 MG/1
100 CAPSULE ORAL 2 TIMES DAILY
Status: DISCONTINUED | OUTPATIENT
Start: 2023-04-28 | End: 2023-04-30 | Stop reason: HOSPADM

## 2023-04-28 RX ORDER — LEVALBUTEROL INHALATION SOLUTION 0.63 MG/3ML
0.63 SOLUTION RESPIRATORY (INHALATION) EVERY 6 HOURS PRN
Status: DISCONTINUED | OUTPATIENT
Start: 2023-04-28 | End: 2023-04-30 | Stop reason: HOSPADM

## 2023-04-28 RX ORDER — GUAIFENESIN 600 MG/1
1200 TABLET, EXTENDED RELEASE ORAL 2 TIMES DAILY
Status: DISCONTINUED | OUTPATIENT
Start: 2023-04-28 | End: 2023-04-30 | Stop reason: HOSPADM

## 2023-04-28 RX ORDER — LEVALBUTEROL INHALATION SOLUTION 1.25 MG/3ML
1.25 SOLUTION RESPIRATORY (INHALATION)
Status: DISCONTINUED | OUTPATIENT
Start: 2023-04-28 | End: 2023-04-30 | Stop reason: HOSPADM

## 2023-04-28 RX ORDER — FAMOTIDINE 20 MG/1
20 TABLET, FILM COATED ORAL
Status: DISCONTINUED | OUTPATIENT
Start: 2023-04-28 | End: 2023-04-30 | Stop reason: HOSPADM

## 2023-04-28 RX ORDER — SODIUM CHLORIDE FOR INHALATION 0.9 %
12 VIAL, NEBULIZER (ML) INHALATION ONCE
Status: COMPLETED | OUTPATIENT
Start: 2023-04-28 | End: 2023-04-28

## 2023-04-28 RX ORDER — PRAVASTATIN SODIUM 80 MG/1
80 TABLET ORAL
Status: DISCONTINUED | OUTPATIENT
Start: 2023-04-29 | End: 2023-04-30 | Stop reason: HOSPADM

## 2023-04-28 RX ORDER — METHYLPREDNISOLONE SODIUM SUCCINATE 125 MG/2ML
60 INJECTION, POWDER, LYOPHILIZED, FOR SOLUTION INTRAMUSCULAR; INTRAVENOUS ONCE
Status: COMPLETED | OUTPATIENT
Start: 2023-04-28 | End: 2023-04-28

## 2023-04-28 RX ORDER — INSULIN LISPRO 100 [IU]/ML
1-5 INJECTION, SOLUTION INTRAVENOUS; SUBCUTANEOUS
Status: DISCONTINUED | OUTPATIENT
Start: 2023-04-28 | End: 2023-04-30 | Stop reason: HOSPADM

## 2023-04-28 RX ORDER — METOPROLOL SUCCINATE 50 MG/1
50 TABLET, EXTENDED RELEASE ORAL DAILY
Status: DISCONTINUED | OUTPATIENT
Start: 2023-04-29 | End: 2023-04-29

## 2023-04-28 RX ORDER — ACETAMINOPHEN 325 MG/1
650 TABLET ORAL EVERY 6 HOURS PRN
Status: DISCONTINUED | OUTPATIENT
Start: 2023-04-28 | End: 2023-04-30 | Stop reason: HOSPADM

## 2023-04-28 RX ORDER — POTASSIUM CHLORIDE 20 MEQ/1
20 TABLET, EXTENDED RELEASE ORAL ONCE
Status: COMPLETED | OUTPATIENT
Start: 2023-04-28 | End: 2023-04-28

## 2023-04-28 RX ADMIN — AZITHROMYCIN 500 MG: 500 INJECTION, POWDER, LYOPHILIZED, FOR SOLUTION INTRAVENOUS at 20:52

## 2023-04-28 RX ADMIN — APIXABAN 5 MG: 5 TABLET, FILM COATED ORAL at 23:06

## 2023-04-28 RX ADMIN — ISODIUM CHLORIDE 12 ML: 0.03 SOLUTION RESPIRATORY (INHALATION) at 18:10

## 2023-04-28 RX ADMIN — POTASSIUM CHLORIDE 20 MEQ: 1500 TABLET, EXTENDED RELEASE ORAL at 23:06

## 2023-04-28 RX ADMIN — IPRATROPIUM BROMIDE 1 MG: 0.5 SOLUTION RESPIRATORY (INHALATION) at 18:11

## 2023-04-28 RX ADMIN — INSULIN LISPRO 14 UNITS: 100 INJECTION, SOLUTION INTRAVENOUS; SUBCUTANEOUS at 23:40

## 2023-04-28 RX ADMIN — PREGABALIN 100 MG: 100 CAPSULE ORAL at 23:06

## 2023-04-28 RX ADMIN — FAMOTIDINE 20 MG: 20 TABLET, FILM COATED ORAL at 23:06

## 2023-04-28 RX ADMIN — ALBUTEROL SULFATE 10 MG: 2.5 SOLUTION RESPIRATORY (INHALATION) at 18:10

## 2023-04-28 RX ADMIN — METHYLPREDNISOLONE SODIUM SUCCINATE 60 MG: 125 INJECTION, POWDER, FOR SOLUTION INTRAMUSCULAR; INTRAVENOUS at 17:57

## 2023-04-28 RX ADMIN — MAGNESIUM SULFATE HEPTAHYDRATE 2 G: 40 INJECTION, SOLUTION INTRAVENOUS at 17:58

## 2023-04-28 RX ADMIN — GUAIFENESIN 1200 MG: 600 TABLET, EXTENDED RELEASE ORAL at 23:06

## 2023-04-28 RX ADMIN — INSULIN GLARGINE 28 UNITS: 100 INJECTION, SOLUTION SUBCUTANEOUS at 23:47

## 2023-04-28 NOTE — ASSESSMENT & PLAN NOTE
Recurrent acute exacerbation of asthma with significant wheezing diffusely with coarse breath sounds in a patient with chronic respiratory failure, hypoxia, hypercarbia, restrictive pulmonary disease, obesity,  Patient just tarted using CPAP yesterday  Symptoms has been getting worse  Last time patient had a outpatient treatment failure and recommend the hospital   Patient appears somewhat more sick at this time with diffuse wheezing  Discussed with the patient and family  Will refer to the emergency room  Patient currently at home has been already on torsemide, Flonase Wixela  Patient also has multiple cardiac comorbidities  In fact patient was supposed to go for the breast surgery in the next Tuesday I will hold off that right now family notified of that  Patients may end up staying in the emergency room  This is all has been explained to the patient's

## 2023-04-28 NOTE — ED NOTES
RT contacted to set up hour long neb and collect ABG        Randy Burch, LUISITO  04/28/23 9945 [place]bo Gallardo, RN  04/28/23 5021

## 2023-04-28 NOTE — ED PROVIDER NOTES
History  Chief Complaint   Patient presents with    Shortness of Breath     For 2 days has had sob and congestion  72-year-old female with past history of asthma, COPD, GERD, hyperlipidemia, SVT, anemia, hypertension, CKD, insulin-dependent diabetes, KATRINA on CPAP, presents to the ED for evaluation of increasing shortness of breath, wheezing over the past 2 days  Patient was at her PCPs office for further evaluation this afternoon  PCP noted audible wheezing to bilateral lungs  PCP sent patient to the ED for further evaluation and management  Patient has had some cold symptoms without any fevers or chills at home  Patient denies any coughing  History provided by:  Patient and relative (Patient's niece)   used: No    Shortness of Breath  Associated symptoms: wheezing    Associated symptoms: no abdominal pain, no chest pain, no cough, no ear pain, no fever, no rash, no sore throat and no vomiting        Prior to Admission Medications   Prescriptions Last Dose Informant Patient Reported? Taking? Albuterol Sulfate (albuterol, FOR EMS ONLY,) (2 5 mg/3 mL) 0 083 % nebulizer solution   Yes No   Sig: Take 2 5 mg by nebulization every 6 (six) hours as needed for wheezing   B Complex-C-Folic Acid (triphrocaps) 1 MG CAPS   No No   Sig: take 1 capsule by mouth once daily   Fluticasone-Salmeterol (Wixela Inhub) 250-50 mcg/dose inhaler   No No   Sig: Inhale 1 puff 2 (two) times a day Rinse mouth after use  Patient taking differently: Inhale 1 puff 2 (two) times a day as needed Rinse mouth after use  Insulin Pen Needle (BD Pen Needle Pilar 2nd Gen) 32G X 4 MM MISC   No No   Sig: For use with insulin pen  Pharmacy may dispense brand covered by insurance     Insulin Pen Needle (NovoFine Autocover) 30G X 8 MM MISC   No No   Sig: Inject under the skin daily   Insulin Pen Needle 30G X 8 MM MISC   Yes No   Si (two) times a day   Insulin Pen Needle 31G X 8 MM MISC   No No   Sig: Use daily Inject under the skin   Lancets (onetouch ultrasoft) lancets   No No   Sig: Use once daily   NovoFine Autocover Pen Needle 30G X 8 MM MISC   No No   Sig: USE TWICE A DAY   albuterol (PROVENTIL HFA,VENTOLIN HFA) 90 mcg/act inhaler   Yes No   Sig: Inhale 2 puffs every 6 (six) hours as needed for wheezing   ammonium lactate (LAC-HYDRIN) 12 % lotion   Yes No   Sig: APPLY TOPICALLY TO AFFECTED AREAS twice a day   apixaban (ELIQUIS) 5 mg   Yes No   Sig: Take 5 mg by mouth 2 (two) times a day   docusate sodium (COLACE) 100 mg capsule   Yes No   Sig: Take 100 mg by mouth in the morning   fluticasone (FLONASE) 50 mcg/act nasal spray   No No   Si spray into each nostril daily   glucose blood (OneTouch Verio) test strip   No No   Sig: Use 1 each 4 (four) times a day Use as instructed   insulin glargine (Toujeo SoloStar) 300 units/mL CONCENTRATED U-300 injection pen (1-unit dial)   Yes No   Sig: Inject 25 Units under the skin every 12 (twelve) hours   insulin lispro (HumaLOG KwikPen) 100 units/mL injection pen   No No   Sig: Use 10 units prior to each meal +1 unit per 50 above 150 mg/dL   metoprolol succinate (TOPROL-XL) 50 mg 24 hr tablet   No No   Sig: take 1 tablet by mouth once daily   montelukast (SINGULAIR) 10 mg tablet   Yes No   Sig: Take 10 mg by mouth daily at bedtime   nystatin (MYCOSTATIN) powder   No No   Sig: Apply topically 2 (two) times a day   pregabalin (LYRICA) 100 mg capsule   No No   Sig: take 1 capsule by mouth twice a day   rosuvastatin (CRESTOR) 10 MG tablet   No No   Sig: take 1 tablet by mouth once daily   semaglutide, 1 mg/dose, (Ozempic, 1 MG/DOSE,) 4 mg/3 mL injection pen   No No   Sig: Inject 0 75 mL (1 mg total) under the skin every 7 days   torsemide (DEMADEX) 20 mg tablet   No No   Sig: Take 2 tablets in the AM      Facility-Administered Medications: None       Past Medical History:   Diagnosis Date    [facility]STIAN [facility]acute [facility]) ([facility] 2023    Anemia     Asthma     Chronic kidney disease  COPD [facility]chronic obstructive pulmonary disease) [facility] )     COVID-19     January 2022    CPAP (continuous positive airway pressure) dependence     Diabetes mellitus (HCC)     GERD (gastroesophageal reflux disease)     Hyperlipidemia     Hypertension     PONV (postoperative nausea and vomiting)     Sleep apnea     SVT (supraventricular tachycardia) (HCC)        Past Surgical History:   Procedure Laterality Date    APPENDECTOMY      BREAST BIOPSY Right     years ago when she was 26    BREAST BIOPSY Right 03/13/2023    CYSTOSCOPY W/ LASER LITHOTRIPSY Left 07/12/2016    Procedure: CYSTOSCOPY URETEROSCOPY WITH LITHOTRIPSY HOLMIUM LASER, RETROGRADE PYELOGRAM AND INSERTION STENT URETERAL;  Surgeon: Jessica Chaudhry MD;  Location: [address] [place] [address];  Service:     DILATION AND CURETTAGE OF UTERUS      IR THORACENTESIS  03/18/2022    JOINT REPLACEMENT      right knee    KNEE ARTHROPLASTY Right     MAMMO NEEDLE LOCALIZATION LEFT (ALL INC) EACH ADD Right 4/24/2023    MAMMO STEREOTACTIC BREAST BIOPSY RIGHT (ALL INC) Right 03/13/2023    High Risk Lesion    FL ARTHROPLASTY GLENOHUMERAL JOINT TOTAL SHOULDER Left 03/01/2022    Procedure: ARTHROPLASTY SHOULDER REVERSE;  Surgeon: Beverley Drake MD;  Location: [facility];  Service: Orthopedics    FL CYSTO BLADDER W/URETERAL CATHETERIZATION Left 06/29/2016    Procedure: CYSTOSCOPY RETROGRADE PYELOGRAM WITH INSERTION STENT URETERAL, left;  Surgeon: Jessica Chaudhry MD;  Location: [facility];  Service: Urology    SHOULDER ARTHROTOMY Left        Family History   Problem Relation Age of Onset    Heart disease Mother     Diabetes Father     Thyroid cancer Sister     Heart disease Brother     Diabetes Brother     Heart disease Brother     Heart disease Brother     Emphysema Maternal Grandmother     Heart disease Family      I have reviewed and agree with the history as documented      E-Cigarette/Vaping    E-Cigarette Use Never User E-Cigarette/Vaping Substances    Nicotine No     THC No     CBD No     Flavoring No     Other No     Unknown No      Social History     Tobacco Use    Smoking status: Former     Packs/day: 1 00     Years: 20 00     Pack years: 20      Types: Cigarettes     Quit date: 1996     Years since quittin 8    Smokeless tobacco: Never   Vaping Use    Vaping Use: Never used   Substance Use Topics    Alcohol use: Not Currently     Comment: rarely    Drug use: Never       Review of Systems   Constitutional: Negative for chills and fever  HENT: Negative for ear pain and sore throat  Eyes: Negative for pain and visual disturbance  Respiratory: Positive for shortness of breath and wheezing  Negative for cough  Cardiovascular: Negative for chest pain and palpitations  Gastrointestinal: Negative for abdominal pain and vomiting  Genitourinary: Negative for dysuria and hematuria  Musculoskeletal: Negative for arthralgias and back pain  Skin: Negative for color change and rash  Neurological: Negative for seizures and syncope  All other systems reviewed and are negative  Physical Exam  Physical Exam  Vitals and nursing note reviewed  Constitutional:       General: She is not in acute distress  Appearance: She is well-developed  HENT:      Head: Normocephalic and atraumatic  Eyes:      Conjunctiva/sclera: Conjunctivae normal    Cardiovascular:      Rate and Rhythm: Normal rate and regular rhythm  Heart sounds: No murmur heard  Pulmonary:      Effort: Pulmonary effort is normal  No respiratory distress  Breath sounds: Wheezing present  Comments: Diffuse inspiratory and expiratory wheezing noted to bilateral lungs  Abdominal:      Palpations: Abdomen is soft  Tenderness: There is no abdominal tenderness  Musculoskeletal:         General: No swelling  Cervical back: Neck supple  Skin:     General: Skin is warm and dry        Capillary Refill: Capillary refill takes less than 2 seconds  Neurological:      Mental Status: She is alert  Psychiatric:         Mood and Affect: Mood normal          Vital Signs  ED Triage Vitals [04/28/23 1701]   Temperature Pulse Respirations Blood Pressure SpO2   98 9 °F (37 2 °C) 90 22 (!) 186/76 94 %      Temp src Heart Rate Source Patient Position - Orthostatic VS BP Location FiO2 (%)   -- -- -- -- --      Pain Score       No Pain           Vitals:    04/28/23 1701 04/28/23 1956 04/28/23 2000   BP: (!) 186/76 (!) 177/83 (!) 177/83   Pulse: 90 85 84         Visual Acuity      ED Medications  Medications   azithromycin (ZITHROMAX) 500 mg in sodium chloride 0 9% 250mL IVPB 500 mg (has no administration in time range)   albuterol inhalation solution 10 mg (10 mg Nebulization Given 4/28/23 1810)   ipratropium (ATROVENT) 0 02 % inhalation solution 1 mg (1 mg Nebulization Given 4/28/23 1811)   sodium chloride 0 9 % inhalation solution 12 mL (12 mL Nebulization Given 4/28/23 1810)   methylPREDNISolone sodium succinate (Solu-MEDROL) injection 60 mg (60 mg Intravenous Given 4/28/23 1757)   magnesium sulfate 2 g/50 mL IVPB (premix) 2 g (0 g Intravenous Stopped 4/28/23 1841)       Diagnostic Studies  Results Reviewed     Procedure Component Value Units Date/Time    HS Troponin I 2hr [[id number]]  (Normal) Collected: 04/28/23 1956    Lab Status: Final result Specimen: Blood from Line, Venous Updated: 04/28/23 2039     hs TnI 2hr 5 ng/L      Delta 2hr hsTnI -2 ng/L     HS Troponin I 4hr [[id number]]     Lab Status: No result Specimen: Blood     COVID only [[id number]]  (Normal) Collected: 04/28/23 1746    Lab Status: Final result Specimen: Nares from Nose Updated: 04/28/23 1850     SARS-CoV-2 Negative    Narrative:      FOR PEDIATRIC PATIENTS - copy/paste COVID Guidelines URL to browser: [email or web address]    SARS-CoV-2 assay is a Nucleic Acid Amplification assay intended for the  qualitative detection of nucleic acid from SARS-CoV-2 in nasopharyngeal  swabs  Results are for the presumptive identification of SARS-CoV-2 RNA  Positive results are indicative of infection with SARS-CoV-2, the virus  causing COVID-19, but do not rule out bacterial infection or co-infection  with other viruses  Laboratories within the [facility] and its  territories are required to report all positive results to the appropriate  public health authorities  Negative results do not preclude SARS-CoV-2  infection and should not be used as the sole basis for treatment or other  patient management decisions  Negative results must be combined with  clinical observations, patient history, and epidemiological information  This test has not been FDA cleared or approved  This test has been authorized by FDA under an Emergency Use Authorization  (EUA)  This test is only authorized for the duration of time the  declaration that circumstances exist justifying the authorization of the  emergency use of an in vitro diagnostic tests for detection of SARS-CoV-2  virus and/or diagnosis of COVID-19 infection under section 564(b)(1) of  the Act, 21 U  S C  196LIA-7(X)(5), unless the authorization is terminated  or revoked sooner  The test has been validated but independent review by FDA  and CLIA is pending  Test performed using [employer] GeneXpert: This RT-PCR assay targets N2,  a region unique to SARS-CoV-2  A conserved region in the E-gene was chosen  for pan-Sarbecovirus detection which includes SARS-CoV-2  According to CMS-2020-01-R, this platform meets the definition of high-throughput technology      Blood gas, arterial [[id number]]  (Abnormal) Collected: 04/28/23 1815    Lab Status: Final result Specimen: Blood, Arterial from Radial, Right Updated: 04/28/23 1830     pH, Arterial 7 441     PH ART TC 7 438     pCO2, Arterial 45 3 mm Hg      PCO2 (TC) Arterial 45 7 mm Hg      pO2, Arterial 164 3 mm Hg      PO2 (TC) Arterial 165 4 mm Hg      HCO3, Arterial 30 1 mmol/L      Base Excess, Arterial 5 3 mmol/L      O2 Content, Arterial 16 3 mL/dL      O2 HGB,Arterial  98 6 %      SOURCE Radial, Right     CITLALLI TEST Yes     Temperature 98 9 Degrees Fehrenheit     Comprehensive metabolic panel [[id number]]  (Abnormal) Collected: 04/28/23 1746    Lab Status: Final result Specimen: Blood from Line, Venous Updated: 04/28/23 1824     Sodium 141 mmol/L      Potassium 3 8 mmol/L      Chloride 98 mmol/L      CO2 32 mmol/L      ANION GAP 11 mmol/L      BUN 39 mg/dL      Creatinine 1 79 mg/dL      Glucose 224 mg/dL      Calcium 8 8 mg/dL      AST 18 U/L      ALT 15 U/L      Alkaline Phosphatase 57 U/L      Total Protein 7 0 g/dL      Albumin 3 8 g/dL      Total Bilirubin 0 30 mg/dL      eGFR 28 ml/min/1 73sq m     Narrative:      [place] guidelines for Chronic Kidney Disease (CKD):     Stage 1 with normal or high GFR (GFR > 90 mL/min/1 73 square meters)    Stage 2 Mild CKD (GFR = 60-89 mL/min/1 73 square meters)    Stage 3A Moderate CKD (GFR = 45-59 mL/min/1 73 square meters)    Stage 3B Moderate CKD (GFR = 30-44 mL/min/1 73 square meters)    Stage 4 Severe CKD (GFR = 15-29 mL/min/1 73 square meters)    Stage 5 End Stage CKD (GFR <15 mL/min/1 73 square meters)  Note: GFR calculation is accurate only with a steady state creatinine    HS Troponin 0hr (reflex protocol) [[id number]]  (Normal) Collected: 04/28/23 1746    Lab Status: Final result Specimen: Blood from Line, Venous Updated: 04/28/23 1821     hs TnI 0hr 7 ng/L     B-Type Natriuretic Peptide(BNP) [[id number]]  (Normal) Collected: 04/28/23 1746    Lab Status: Final result Specimen: Blood from Line, Venous Updated: 04/28/23 1820     BNP 24 pg/mL     CBC and differential [[id number]]  (Abnormal) Collected: 04/28/23 1746    Lab Status: Final result Specimen: Blood from Line, Venous Updated: 04/28/23 1756     WBC 9 70 Thousand/uL      RBC 3 87 Million/uL Hemoglobin 11 6 g/dL      Hematocrit 35 5 %      MCV 92 fL      MCH 30 0 pg      MCHC 32 7 g/dL      RDW 14 3 %      MPV 10 6 fL      Platelets 777 Thousands/uL      nRBC 0 /100 WBCs      Neutrophils Relative 58 %      Immat GRANS % 0 %      Lymphocytes Relative 22 %      Monocytes Relative 11 %      Eosinophils Relative 8 %      Basophils Relative 1 %      Neutrophils Absolute 5 61 Thousands/µL      Immature Grans Absolute 0 04 Thousand/uL      Lymphocytes Absolute 2 17 Thousands/µL      Monocytes Absolute 1 07 Thousand/µL      Eosinophils Absolute 0 73 Thousand/µL      Basophils Absolute 0 08 Thousands/µL     UA w Reflex to Microscopic w Reflex to Culture [[id number]]     Lab Status: No result Specimen: Urine                  CT chest without contrast   Final Result by Kristen Hankins MD (04/28 2005)      New trace pericardial effusion  Recommend correlation with echocardiogram       Small scattered bilateral pulmonary nodules measuring up to 0 5 cm  Based on current Fleischner Society 2017 Guidelines on incidental pulmonary nodule, no routine follow-up is needed if the patient is low risk  If the patient is high risk, optional    follow-up chest CT at 12 months can be considered  The study was marked in [facility] for immediate notification                 Workstation performed: [id number]         XR chest 1 view portable    (Results Pending)              Procedures  ECG 12 Lead Documentation Only    Date/Time: 4/28/2023 6:00 PM  Performed by: Robinson Bryan DO  Authorized by: Robinson Bryan DO     Indications / Diagnosis:  Shortness of breath  ECG reviewed by me, the ED Provider: yes    Patient location:  ED  Previous ECG:     Previous ECG:  Compared to current    Similarity:  Changes noted    Comparison to cardiac monitor: Yes    Interpretation:     Interpretation: abnormal    Comments:      Sinus rhythm, rate 83, left axis deviation, no acute ST/T wave abnormality noted, deep S waves noted in V1 through V3 suggesting septal infarct of undetermined age that is unchanged from previous study    CriticalCare Time    Date/Time: 4/28/2023 8:52 PM  Performed by: Jorge Luis Garcia DO  Authorized by: Jorge Luis Garcia DO     Critical care provider statement:     Critical care time (minutes):  60    Critical care time was exclusive of:  Separately billable procedures and treating other patients    Critical care was necessary to treat or prevent imminent or life-threatening deterioration of the following conditions:  Respiratory failure    Critical care was time spent personally by me on the following activities:  Blood draw for specimens, obtaining history from patient or surrogate, development of treatment plan with patient or surrogate, discussions with consultants, evaluation of patient's response to treatment, examination of patient, review of old charts, re-evaluation of patient's condition, ordering and review of laboratory studies, ordering and review of radiographic studies, interpretation of cardiac output measurements and ordering and performing treatments and interventions  Comments:      COPD/asthma exacerbation             ED Course  ED Course as of 04/28/23 2052 Fri Apr 28, 2023 2029 Pt re-examined at bedside  She continues to have tightness with expiratory and inspiratory wheezing noted bilaterally  She feels better compared to initial arrival   At this time azithromycin will be started and patient will be admitted for asthma/COPD exacerbation  SBIRT 22yo+    Flowsheet Row Most Recent Value   Initial Alcohol Screen: US AUDIT-C     1  How often do you have a drink containing alcohol? 0 Filed at: 04/28/2023 1703   2  How many drinks containing alcohol do you have on a typical day you are drinking? 0 Filed at: 04/28/2023 1703   3a  Male UNDER 65: How often do you have five or more drinks on one occasion? 0 Filed at: 04/28/2023 1703   3b  FEMALE Any Age, or MALE 65+:  How often do you have 4 or more drinks on one occassion? 0 Filed at: 04/28/2023 1703   Audit-C Score 0 Filed at: 04/28/2023 1703   AYDIN: How many times in the past year have you    Used an illegal drug or used a prescription medication for non-medical reasons? Never Filed at: 04/28/2023 1703                    Medical Decision Making  Obtain blood work, ABG, EKG, chest x-ray  Give steroids, neb treatment, magnesium, and continue to monitor patient for any worsening symptoms    She has lab work was at baseline  Chest x-ray was equivocal subsequently CT chest was obtained that showed some small pericardial effusion as well as pulmonary nodules  Patient was already aware of the pulmonary nodules  After 1 hour long neb treatment patient continues to have chest tightness as well as bilateral inspiratory and expiratory wheezing  At this time azithromycin was started for COPD exacerbation and patient is admitted for further evaluation and management  Patient agrees with admission plans  Amount and/or Complexity of Data Reviewed  External Data Reviewed: labs and ECG  Labs: ordered  Decision-making details documented in ED Course  Radiology: ordered  Decision-making details documented in ED Course  ECG/medicine tests: ordered and independent interpretation performed  Decision-making details documented in ED Course  Risk  Prescription drug management  Decision regarding hospitalization            Disposition  Final diagnoses:   COPD with acute exacerbation ([facility] )   Asthma   Pericardial effusion without cardiac tamponade   Pulmonary nodules     Time reflects when diagnosis was documented in both MDM as applicable and the Disposition within this note     Time User Action Codes Description Comment    4/28/2023  8:32 PM Orlando Rayo [J44 1] COPD with acute exacerbation ([facility] )     4/28/2023  8:32 PM Amador Zarate Add [E44 277] Asthma     4/28/2023  8:32 PM Carlos Yanez Add [I31 39] Pericardial effusion without cardiac tamponade     4/28/2023  8:32 PM Danielle Alcaraz Add [R91 8] Pulmonary nodules       ED Disposition     ED Disposition   Admit    Condition   Stable    Date/Time   Fri Apr 28, 2023  8:38 PM    Comment   Case was discussed with NP for hospitalist and the patient's admission status was agreed to be Admission Status: observation status to the service of Dr Shad Mann  Follow-up Information    None         Patient's Medications   Discharge Prescriptions    No medications on file       No discharge procedures on file      PDMP Review       Value Time User    PDMP Reviewed  Yes 4/6/2023 11:35 AM Lashell Ellis MD          ED Provider  Electronically Signed by           Jessica Brunson DO  04/28/23 2052

## 2023-04-28 NOTE — PATIENT INSTRUCTIONS
Reported the emergency room for evaluation for acute respiratory failure  Patient  Patient does have a significant acute trigger bronchitis with diffuse wheezing  May end up staying in the emergency room  Will await emergency room work-up    Please refer to my HPI and assessment    At risk for decompensation

## 2023-04-29 ENCOUNTER — APPOINTMENT (OUTPATIENT)
Dept: NON INVASIVE DIAGNOSTICS | Facility: HOSPITAL | Age: 73
End: 2023-04-29

## 2023-04-29 LAB
ANION GAP SERPL CALCULATED.3IONS-SCNC: 8 MMOL/L (ref 4–13)
APICAL FOUR CHAMBER EJECTION FRACTION: 59 %
BACTERIA UR QL AUTO: ABNORMAL /HPF
BILIRUB UR QL STRIP: NEGATIVE
BUN SERPL-MCNC: 37 MG/DL (ref 5–25)
CALCIUM SERPL-MCNC: 8.1 MG/DL (ref 8.4–10.2)
CHLORIDE SERPL-SCNC: 101 MMOL/L (ref 96–108)
CLARITY UR: CLEAR
CO2 SERPL-SCNC: 30 MMOL/L (ref 21–32)
COLOR UR: YELLOW
CREAT SERPL-MCNC: 1.67 MG/DL (ref 0.6–1.3)
E WAVE DECELERATION TIME: 139 MS
GFR SERPL CREATININE-BSD FRML MDRD: 30 ML/MIN/1.73SQ M
GLUCOSE P FAST SERPL-MCNC: 288 MG/DL (ref 65–99)
GLUCOSE SERPL-MCNC: 277 MG/DL (ref 65–140)
GLUCOSE SERPL-MCNC: 288 MG/DL (ref 65–140)
GLUCOSE SERPL-MCNC: 324 MG/DL (ref 65–140)
GLUCOSE SERPL-MCNC: 330 MG/DL (ref 65–140)
GLUCOSE SERPL-MCNC: 336 MG/DL (ref 65–140)
GLUCOSE SERPL-MCNC: 351 MG/DL (ref 65–140)
GLUCOSE UR STRIP-MCNC: ABNORMAL MG/DL
HGB UR QL STRIP.AUTO: ABNORMAL
HYALINE CASTS #/AREA URNS LPF: ABNORMAL /LPF
KETONES UR STRIP-MCNC: NEGATIVE MG/DL
LEUKOCYTE ESTERASE UR QL STRIP: ABNORMAL
MAGNESIUM SERPL-MCNC: 2.3 MG/DL (ref 1.9–2.7)
MV E'TISSUE VEL-SEP: 8 CM/S
MV PEAK A VEL: 1.11 M/S
MV PEAK E VEL: 91 CM/S
MV STENOSIS PRESSURE HALF TIME: 40 MS
MV VALVE AREA P 1/2 METHOD: 5.5 CM2
NITRITE UR QL STRIP: NEGATIVE
NON-SQ EPI CELLS URNS QL MICRO: ABNORMAL /HPF
PH UR STRIP.AUTO: 6 [PH]
POTASSIUM SERPL-SCNC: 4.8 MMOL/L (ref 3.5–5.3)
PROCALCITONIN SERPL-MCNC: 0.06 NG/ML
PROT UR STRIP-MCNC: NEGATIVE MG/DL
RBC #/AREA URNS AUTO: ABNORMAL /HPF
SODIUM SERPL-SCNC: 139 MMOL/L (ref 135–147)
SP GR UR STRIP.AUTO: 1.01 (ref 1–1.03)
TR MAX PG: 12 MMHG
TR PEAK VELOCITY: 1.7 M/S
TRICUSPID VALVE PEAK REGURGITATION VELOCITY: 1.71 M/S
UROBILINOGEN UR QL STRIP.AUTO: 0.2 E.U./DL
WBC #/AREA URNS AUTO: ABNORMAL /HPF

## 2023-04-29 RX ORDER — DILTIAZEM HYDROCHLORIDE 5 MG/ML
10 INJECTION INTRAVENOUS ONCE
Status: DISCONTINUED | OUTPATIENT
Start: 2023-04-29 | End: 2023-04-29

## 2023-04-29 RX ORDER — NYSTATIN 100000 [USP'U]/G
POWDER TOPICAL 2 TIMES DAILY
Status: DISCONTINUED | OUTPATIENT
Start: 2023-04-29 | End: 2023-04-30 | Stop reason: HOSPADM

## 2023-04-29 RX ORDER — METHYLPREDNISOLONE SODIUM SUCCINATE 40 MG/ML
40 INJECTION, POWDER, LYOPHILIZED, FOR SOLUTION INTRAMUSCULAR; INTRAVENOUS EVERY 12 HOURS SCHEDULED
Status: DISCONTINUED | OUTPATIENT
Start: 2023-04-29 | End: 2023-04-30 | Stop reason: HOSPADM

## 2023-04-29 RX ORDER — DILTIAZEM HYDROCHLORIDE 5 MG/ML
15 INJECTION INTRAVENOUS ONCE
Status: COMPLETED | OUTPATIENT
Start: 2023-04-29 | End: 2023-04-29

## 2023-04-29 RX ORDER — INSULIN GLARGINE 100 [IU]/ML
32 INJECTION, SOLUTION SUBCUTANEOUS EVERY 12 HOURS SCHEDULED
Status: DISCONTINUED | OUTPATIENT
Start: 2023-04-29 | End: 2023-04-30 | Stop reason: HOSPADM

## 2023-04-29 RX ORDER — METOPROLOL SUCCINATE 50 MG/1
50 TABLET, EXTENDED RELEASE ORAL 2 TIMES DAILY
Status: DISCONTINUED | OUTPATIENT
Start: 2023-04-29 | End: 2023-04-30 | Stop reason: HOSPADM

## 2023-04-29 RX ADMIN — ACETAMINOPHEN 650 MG: 325 TABLET, FILM COATED ORAL at 21:31

## 2023-04-29 RX ADMIN — APIXABAN 5 MG: 5 TABLET, FILM COATED ORAL at 17:46

## 2023-04-29 RX ADMIN — INSULIN LISPRO 5 UNITS: 100 INJECTION, SOLUTION INTRAVENOUS; SUBCUTANEOUS at 11:47

## 2023-04-29 RX ADMIN — INSULIN LISPRO 5 UNITS: 100 INJECTION, SOLUTION INTRAVENOUS; SUBCUTANEOUS at 16:36

## 2023-04-29 RX ADMIN — APIXABAN 5 MG: 5 TABLET, FILM COATED ORAL at 08:03

## 2023-04-29 RX ADMIN — LEVALBUTEROL HYDROCHLORIDE 1.25 MG: 1.25 SOLUTION RESPIRATORY (INHALATION) at 00:07

## 2023-04-29 RX ADMIN — PREGABALIN 100 MG: 100 CAPSULE ORAL at 08:03

## 2023-04-29 RX ADMIN — INSULIN LISPRO 4 UNITS: 100 INJECTION, SOLUTION INTRAVENOUS; SUBCUTANEOUS at 08:04

## 2023-04-29 RX ADMIN — INSULIN LISPRO 14 UNITS: 100 INJECTION, SOLUTION INTRAVENOUS; SUBCUTANEOUS at 08:04

## 2023-04-29 RX ADMIN — METHYLPREDNISOLONE SODIUM SUCCINATE 40 MG: 40 INJECTION, POWDER, FOR SOLUTION INTRAMUSCULAR; INTRAVENOUS at 21:20

## 2023-04-29 RX ADMIN — LEVALBUTEROL HYDROCHLORIDE 1.25 MG: 1.25 SOLUTION RESPIRATORY (INHALATION) at 07:20

## 2023-04-29 RX ADMIN — AZITHROMYCIN MONOHYDRATE 500 MG: 250 TABLET ORAL at 21:21

## 2023-04-29 RX ADMIN — INSULIN LISPRO 14 UNITS: 100 INJECTION, SOLUTION INTRAVENOUS; SUBCUTANEOUS at 11:47

## 2023-04-29 RX ADMIN — NYSTATIN: 100000 POWDER TOPICAL at 11:48

## 2023-04-29 RX ADMIN — INSULIN LISPRO 4 UNITS: 100 INJECTION, SOLUTION INTRAVENOUS; SUBCUTANEOUS at 21:23

## 2023-04-29 RX ADMIN — TORSEMIDE 40 MG: 20 TABLET ORAL at 08:02

## 2023-04-29 RX ADMIN — IPRATROPIUM BROMIDE 0.5 MG: 0.5 SOLUTION RESPIRATORY (INHALATION) at 00:07

## 2023-04-29 RX ADMIN — METOPROLOL SUCCINATE 50 MG: 50 TABLET, EXTENDED RELEASE ORAL at 21:22

## 2023-04-29 RX ADMIN — MONTELUKAST 10 MG: 10 TABLET, FILM COATED ORAL at 08:03

## 2023-04-29 RX ADMIN — LEVALBUTEROL HYDROCHLORIDE 1.25 MG: 1.25 SOLUTION RESPIRATORY (INHALATION) at 20:02

## 2023-04-29 RX ADMIN — IPRATROPIUM BROMIDE 0.5 MG: 0.5 SOLUTION RESPIRATORY (INHALATION) at 07:20

## 2023-04-29 RX ADMIN — INSULIN LISPRO 3 UNITS: 100 INJECTION, SOLUTION INTRAVENOUS; SUBCUTANEOUS at 02:44

## 2023-04-29 RX ADMIN — INSULIN GLARGINE 28 UNITS: 100 INJECTION, SOLUTION SUBCUTANEOUS at 08:02

## 2023-04-29 RX ADMIN — FLUTICASONE FUROATE AND VILANTEROL TRIFENATATE 1 PUFF: 100; 25 POWDER RESPIRATORY (INHALATION) at 08:04

## 2023-04-29 RX ADMIN — PERFLUTREN 0.4 ML/MIN: 6.52 INJECTION, SUSPENSION INTRAVENOUS at 09:51

## 2023-04-29 RX ADMIN — IPRATROPIUM BROMIDE 0.5 MG: 0.5 SOLUTION RESPIRATORY (INHALATION) at 20:02

## 2023-04-29 RX ADMIN — FAMOTIDINE 20 MG: 20 TABLET, FILM COATED ORAL at 21:21

## 2023-04-29 RX ADMIN — FLUTICASONE PROPIONATE 1 SPRAY: 50 SPRAY, METERED NASAL at 08:04

## 2023-04-29 RX ADMIN — INSULIN LISPRO 14 UNITS: 100 INJECTION, SOLUTION INTRAVENOUS; SUBCUTANEOUS at 16:36

## 2023-04-29 RX ADMIN — IPRATROPIUM BROMIDE 0.5 MG: 0.5 SOLUTION RESPIRATORY (INHALATION) at 13:26

## 2023-04-29 RX ADMIN — METOPROLOL SUCCINATE 50 MG: 50 TABLET, EXTENDED RELEASE ORAL at 08:03

## 2023-04-29 RX ADMIN — GUAIFENESIN 1200 MG: 600 TABLET, EXTENDED RELEASE ORAL at 21:21

## 2023-04-29 RX ADMIN — PRAVASTATIN SODIUM 80 MG: 80 TABLET ORAL at 16:36

## 2023-04-29 RX ADMIN — DILTIAZEM HYDROCHLORIDE 15 MG: 5 INJECTION INTRAVENOUS at 02:33

## 2023-04-29 RX ADMIN — GUAIFENESIN 1200 MG: 600 TABLET, EXTENDED RELEASE ORAL at 08:02

## 2023-04-29 RX ADMIN — METHYLPREDNISOLONE SODIUM SUCCINATE 40 MG: 40 INJECTION, POWDER, FOR SOLUTION INTRAMUSCULAR; INTRAVENOUS at 08:03

## 2023-04-29 RX ADMIN — LEVALBUTEROL HYDROCHLORIDE 1.25 MG: 1.25 SOLUTION RESPIRATORY (INHALATION) at 13:26

## 2023-04-29 RX ADMIN — INSULIN GLARGINE 32 UNITS: 100 INJECTION, SOLUTION SUBCUTANEOUS at 21:22

## 2023-04-29 RX ADMIN — PREGABALIN 100 MG: 100 CAPSULE ORAL at 17:46

## 2023-04-29 NOTE — ASSESSMENT & PLAN NOTE
· Baseline creatinine appears around 1 5-1 7s  · Creatinine continues to remain stable  Results from last 7 days   Lab Units 04/29/23  0702 04/28/23  1746   BUN mg/dL 37* 39*   CREATININE mg/dL 1 67* 1 79*   ·

## 2023-04-29 NOTE — CONSULTS
Consultation - Pulmonary Medicine   Mya Zacarias 81 y o  female MRN: [id number]  Unit/Bed#: 23809 [facility] 408-01 Encounter: [id number]      Physician Requesting Consult: Dee Mittal  Reason for Consult: COPD exacerbation      Assessment:  1  Acute pulmonary sufficiency  2  Moderate persistent asthma with acute exacerbation  Patient does not have COPD  No emphysema on CT scan  She quit smoking 1996   2 spirometry over the past few years showed no fixed obstruction  3  Suspect viral acute bronchitis triggering the asthma exacerbation  4  Restrictive airway disease on spirometry most likely secondary to morbid obesity, no evidence of interstitial lung disease on CT scan  5  Allergic rhinitis with postnasal drip  6  Morbid obesity with KATRINA and nocturnal hypoxia on CPAP 7 cm H2O at night with oxygen  7  Paroxysmal atrial fibrillation on Eliquis  8  Probably chronic diastolic CHF on torsemide, appears compensated    Plan:   · Continue oxygen at night and as needed to maintain pulse ox more than 88%  · Continue Solu-Medrol for today and consider switching to prednisone tomorrow if stable then treat for another 5 days  · Continue bronchodilators nebulizer therapy and inhalers with Breo, on discharge she can resume her Wixela 250/50  · Continue Flonase and Singulair for postnasal drip and allergic rhinitis  · Encourage out of bed and ambulation  · Continue CPAP with oxygen at night  · Complete 3 days course of azithromycin 500 mg p o  · We will evaluate tomorrow, if stable may be discharged home    ______________________________________________________________________    HPI:    Mya Zacarias is a 81 y o  female who presents for worsening respiratory symptoms including shortness of breath  Patient has history of asthma since her 35s, she is managed at home with [facility] and as needed albuterol  She is a former smoker who quit smoking remotely about 30 years ago    Patient has been doing fine until few days ago when she started to have respiratory symptoms including shortness of breath, chest tightness and wheezing, cough with intermittent sputum production describes as green-yellowish sputum  She denies fever chills or night sweats  Denies sick contact  She has allergic rhinitis with postnasal drip sometimes on Flonase  She has GERD that is controlled with medications and denies dysphagia or aspiration  Patient has morbid obesity and KATRINA on CPAP at night  Also she has nocturnal hypoxia and she uses 1-2 L at night with CPAP also as needed during the day  She lives at home with her niece and her niece's family, she is a former smoker quit 1996, worked in multiple jobs and no significant occupational exposures, they have multiple animals at home including dogs, cats and goats but no exposure to birds  PFT results:  Bedside spirometry 2022:  FEV1/FVC 73%, FVC 0 99 L / 36%, FEV1 0 72 L / 34%  This is consistent with severe restrictive airflow imitation without obstruction  There was no significant bronchodilator response    Spirometry in 2019:  FEV1/FVC 77%, FVC 1 63 L / 57%, FEV1 1 18 L / 54%  This is consistent with moderately severe restrictive airflow imitation without obstruction    Review of Systems:  12 points Full review of systems was performed  Aside from what was mentioned in the HPI, it is otherwise negative      Historical Information   Past Medical History:   Diagnosis Date    SEBASTIAN (acute kidney injury) ([place] [address] ) 01/23/2023    Anemia     Asthma     Atrial fibrillation (HCC)     Chronic kidney disease     COPD (chronic obstructive pulmonary disease) ([place] [address] )     COVID-19     January 2022    Diabetes mellitus ([place] [address] )     GERD (gastroesophageal reflux disease)     Hyperlipidemia     Hypertension     PONV (postoperative nausea and vomiting)     Sleep apnea     wear BIPAP    SVT [facility]ra[facility]ricular tachycardia) [facility]      Past Surgical History:   Procedure Laterality Date    APPENDECTOMY      BREAST BIOPSY Right years ago when she was 26    BREAST BIOPSY Right 2023    CYSTOSCOPY W/ LASER LITHOTRIPSY Left 2016    Procedure: CYSTOSCOPY URETEROSCOPY WITH LITHOTRIPSY HOLMIUM LASER, RETROGRADE PYELOGRAM AND INSERTION STENT URETERAL;  Surgeon: Lucia Putnam MD;  Location: [address] [place] [address];  Service:     DILATION AND CURETTAGE OF UTERUS      IR THORACENTESIS  2022    JOINT REPLACEMENT      right knee    KNEE ARTHROPLASTY Right     MAMMO NEEDLE LOCALIZATION LEFT (ALL INC) EACH ADD Right 2023    MAMMO STEREOTACTIC BREAST BIOPSY RIGHT (ALL INC) Right 2023    High Risk Lesion    IA ARTHROPLASTY GLENOHUMERAL JOINT TOTAL SHOULDER Left 2022    Procedure: ARTHROPLASTY SHOULDER REVERSE;  Surgeon: Letitia Griggs MD;  Location: [facility];  Service: Orthopedics    IA CYSTO BLADDER W/URETERAL CATHETERIZATION Left 2016    Procedure: CYSTOSCOPY RETROGRADE PYELOGRAM WITH INSERTION STENT URETERAL, left;  Surgeon: Lucia Putnam MD;  Location: [facility];  Service: Urology    SHOULDER ARTHROTOMY Left      Social History   Social History     Substance and Sexual Activity   Alcohol Use Not Currently    Comment: rarely     Social History     Tobacco Use   Smoking Status Former    Packs/day: 1 00    Years: 20 00    Pack years: 20 00    Types: Cigarettes    Quit date: 1996    Years since quittin 8   Smokeless Tobacco Never       Occupational history:  No occupational exposure    Family History:   Family History   Problem Relation Age of Onset    Heart disease Mother     Diabetes Father     Thyroid cancer Sister     Heart disease Brother     Diabetes Brother     Heart disease Brother     Heart disease Brother     Emphysema Maternal Grandmother     Heart disease Family        Medications: The patient's active and prehospital medications were reviewed    Current Facility-Administered Medications   Medication Dose Route Frequency Provider Last Rate    "acetaminophen  650 mg Oral Q6H PRN Osiris Palencia, CRNP      apixaban  5 mg Oral BID Cuissac Palencia, CRNP      azithromycin  500 mg Oral Q24H Osiris Palencia, CRNP      calcium carbonate  1,000 mg Oral BID PRN Osriis Palencia, CRNP      famotidine  20 mg Oral HS Osiris Palencia, CRNP      fluticasone  1 spray Nasal Daily Cuissac Palencia, CRNP      Fluticasone Furoate-Vilanterol  1 puff Inhalation Daily Cuissac Palencia, CRNP      guaiFENesin  1,200 mg Oral BID Osiris Palencia, CRNP      insulin glargine  28 Units Subcutaneous Q12H [facility] Osiris Palencia, CRNP      insulin lispro  1-5 Units Subcutaneous HS Osiris Palencia, CRNP      insulin lispro  1-5 Units Subcutaneous 0200 Osiris Palencia, CRNP      insulin lispro  1-6 Units Subcutaneous TID AC Osiris Palencia, CRNP      insulin lispro  14 Units Subcutaneous TID With Meals Osiris Palencia, CRDALILA      ipratropium  0 5 mg Nebulization TID Osiris Palencia, CRDALILA      levalbuterol  0 63 mg Nebulization Q6H PRN Osiris Palencia, CRNP      levalbuterol  1 25 mg Nebulization TID Osiris Palencia, CRDALILA      methylPREDNISolone sodium succinate  40 mg Intravenous Q12H [facility] Osiris Palencia, RK      metoprolol succinate  50 mg Oral BID Alina Pereira PA-C      montelukast  10 mg Oral Daily Cuissac Palencia, RK      nystatin   Topical BID Jocelyn Castillo MD      pravastatin  80 mg Oral Daily With Kd Darden, RK      pregabalin  100 mg Oral BID Osiris Palencia, RK      torsemide  40 mg Oral Daily Cuissac Palencia, RK           PhysicalExamination:  Vitals:   Vitals:    04/29/23 0600 04/29/23 0723 04/29/23 0742 04/29/23 0910   BP:   123/76 123/76   Pulse: 86  93 92   Resp:   22    Temp:   97 5 °F (36 4 °C)    TempSrc:       SpO2: 97% 98% 96% 98%   Weight:    116 kg (255 lb 11 7 oz)   Height:    5' 2\" (1 575 m)     Temp  Min: 97 °F (36 1 °C)  Max: 98 9 °F (37 2 °C)  IBW (Ideal Body Weight): 50 1 kg    SpO2: 98 %,   SpO2 Activity: At Rest,   O2 Device: Nasal cannula    General: alert, not in acute " distress  HEENT: PERRL, no icteric sclera or cyanosis, no thrush  Neck: Supple, no lymphadenopathy or thyromegaly, no JVD  Lungs: Equal breath sounds mildly diminished with mild end expiratory wheezes, no crackles  Heart: S1S2 regular, no murmurs or gallops  Abdomen: soft, nontender, bowel sounds present  Extremities: Trace edema, no clubbing or cyanosis  Neuro: Alert and oriented x 3, no focal neurodeficits   Skin: intact, no rashes    Diagnostic Data:  CBC:   Results from last 7 days   Lab Units 04/28/23  1746   WBC Thousand/uL 9 70   HEMOGLOBIN g/dL 11 6   HEMATOCRIT % 35 5   PLATELETS Thousands/uL 226       CMP:   Results from last 7 days   Lab Units 04/29/23  0702 04/28/23  1746   POTASSIUM mmol/L 4 8 3 8   CHLORIDE mmol/L 101 98   CO2 mmol/L 30 32   BUN mg/dL 37* 39*   CREATININE mg/dL 1 67* 1 79*   CALCIUM mg/dL 8 1* 8 8   ALK PHOS U/L  --  57   ALT U/L  --  15   AST U/L  --  18     PT/INR:   No results found for: PT, INR,     Microbiology:         ABG:   Lab Results   Component Value Date    PHART 7 441 04/28/2023    DYB2FRY 45 3 (H) 04/28/2023    PO2ART 164 3 (H) 04/28/2023    XPF0VLB 30 1 (H) 04/28/2023    BEART 5 3 04/28/2023    SOURCE Radial, Right 04/28/2023       Sleep study/CPAP titration 2003: AHI 10 9, mild obstructive sleep apnea, treatable with CPAP 7 cm H2O, associated with fragmented sleep and hypoxia      Imaging:  Chest x-ray reviewed on PACS: Clear lungs    Chest CT scan reviewed on PACS: Clear lungs parenchyma, no evidence of emphysema, few 5 mm lung nodules    Cardiac lab/EKG/telemetry/ECHO:       Results from last 7 days   Lab Units 04/28/23  1746   BNP pg/mL 24       Echocardiogram: Normal LV size and systolic function and normal diastolic dysfunction, normal RV, no evidence of pulmonary hypertension  Trivial pericardial effusion    LVEF estimated 50-55% on prior echocardiogram earlier this year     Code Status: Level 1 - Full Code    Siria Osman MD    Portions of the record "may have been created with voice recognition software  Occasional wrong word or \"sound a like\" substitutions may have occurred due to the inherent limitations of voice recognition software  Read the chart carefully and recognize, using context, where substitutions have occurred        "

## 2023-04-29 NOTE — ASSESSMENT & PLAN NOTE
Lab Results   Component Value Date    HGBA1C 8 1 (H) 03/25/2023       Recent Labs     04/28/23  2234 04/28/23  2307   POCGLU 394* 448*       Blood Sugar Average: Last 72 hrs:  (P) 421   · A1c 8 1 last month  · On Lantus 25 units subcu twice daily, Humalog 10 units twice daily with SSI, Ozempic at home  · Glucose 224  Anticipate hyperglycemia with steroids    · Increase Lantus to 28 units SQ twice daily  · Increase Humalog to 14 units 3 times daily  · SSI  · Hold Ozempic while inpatient  · Diabetic diet

## 2023-04-29 NOTE — PROGRESS NOTES
[facility]  Progress Note  Name: Lory Villalpando  MRN: [id number]  Unit/Bed#: 4 [place] 408-01 I Date of Admission: 4/28/2023   Date of Service: 4/29/2023 I Hospital Day: 0    Assessment/Plan   * Moderate persistent asthma with acute exacerbation  Assessment & Plan  Patient presents with worsening SOB, worsening cough from baseline for past 2 days  Reports lots of wheezing  Denies fever, chills  Reports history of COPD and asthma, uses Wixela inhaler once a day and albuterol neb 3 times a day at home  · Patient was hospitalized 1 month ago for acute on chronic respiratory failure secondary to reactive airway disease with acute exacerbation  · Patient satting well on room air in ED  · Eosinophils 8  Suspect asthma exacerbation  · CT chest - No focal consolidation or groundglass opacity  Small scattered bilateral pulmonary nodules measuring up to 0 5 cm  New trace pericardial effusion  · Received IV Solu-Medrol, 1 hour neb, Zithromax  · Continue Solu-Medrol 40 mg IV every 8 hours for today  · Continue Zithromax for 2 more days  · Continue Mucinex and Singulair  Patient is on [address] [place] [address] which is substituted with Breo  · Continue Xopenex/Atrovent 3 times daily, Xopenex as needed  · Sputum Gram stain and culture if able  · Monitor peak flows  · Pulmonary input pending    PSVT (paroxysmal supraventricular tachycardia) (HCC)  Assessment & Plan  · Heart rate was up to 150s in ED on exam   EKG showed sinus tach, rate 111  · Heart rate up to 130s on the floor  EKGs showed SVT, rate 130-140s    · Received Cardizem 15 mg IV x1 dose  · Patient is on Toprol-XL 50 mg p o  daily at home  · Blood pressures were repleted and magnesium and potassium levels are normal this morning  · Cardiology input appreciated-Toprol-XL increased to 50 mg p o  twice daily    Chronic diastolic heart failure (HCC)  Assessment & Plan  Wt Readings from Last 3 Encounters:   04/29/23 116 kg (255 lb 11 7 oz)   04/28/23 117 kg (259 lb)   04/18/23 115 kg (253 lb)   · On torsemide 40 mg p o  daily at home  · 2D echo last month showed EF 50 to 04%, diastolic function is normal   PA pressure 39  No pericardial effusion  · Chronic bilateral leg edema per patient  · BNP 24  · CT showed - New trace pericardial effusion  · Limited 2D echo showed trivial pericardial effusion with normal systolic function  · Continue torsemide   · Follow-up lower extremity venous Dopplers          Paroxysmal atrial fibrillation (HCC)  Assessment & Plan  · On metoprolol, Eliquis at home  We will continue  · EKG sinus rhythm to sinus tach in ED  · Avoid albuterol neb  · Xopenex as above  · Optimize electrolytes  Potassium 3 8, mag 1 6  Replete  · Electrolytes normalized with replacement  Obstructive sleep apnea  Assessment & Plan  · Had recent repeat sleep study  Just got a new machine and started to use it last night  Was on oxygen 2L with machine at bedtime, was cut down to 1 L with the new machine  · Reviewed pulmonary note  Patient was ordered CPAP at 7 cmH2O with 1 L of oxygen at bedtime  · Patient was satting 86 to 87% with above settings per RT  Will increase oxygen to 2 L and monitor patient  CKD (chronic kidney disease)  Assessment & Plan  · Baseline creatinine appears around 1 5-1 7s  · Creatinine continues to remain stable  Results from last 7 days   Lab Units 04/29/23  0702 04/28/23  1746   BUN mg/dL 37* 39*   CREATININE mg/dL 1 67* 1 79*   ·     Type 2 diabetes mellitus with hyperglycemia, without long-term current use of insulin [facility])  Assessment & Plan  Lab Results   Component Value Date    HGBA1C 8 1 (H) 03/25/2023       Recent Labs     04/28/23  2307 04/29/23  0221 04/29/23  0732 04/29/23  1101   POCGLU 448* 351* 277* 336*       Blood Sugar Average: Last 72 hrs:  (P) 361 2   · A1c 8 1 last month  · On Lantus 25 units subcu twice daily, Humalog 10 units twice daily with SSI, Ozempic at home  · Glucose 224    Anticipate hyperglycemia with steroids  · Patient currently on Lantus 28 units twice daily which was increased to 32 units twice daily  · Continue Humalog 14 units with meals  · Continue Humalog sliding scale with Accu-Cheks before every meal and at bedtime  · Uncontrolled blood sugars likely secondary to steroids  · Hold Ozempic while inpatient  · Diabetic diet    Chronic respiratory failure with hypoxia and hypercapnia (HCC)  Assessment & Plan  · Suspect multifactorial secondary to asthma, sleep apnea, restrictive pulmonary disease, obesity hypoventilation syndrome  · At baseline does not wear oxygen during the day  · ABG showed normal pH, PCO2 45 7  · Patient has been stable on room air  · Continue CPAP with oxygen at HS as below  Mass overlapping multiple quadrants of right breast  Assessment & Plan  · Patient is scheduled for lumpectomy on May 2  Will need to be postponed due to # 1  Lung nodule  Assessment & Plan  · Multiple bilateral pulmonary nodules on CT  Known on prior CT  · Follow-up PCP/pulmonary as outpatient    Gastroesophageal reflux disease  Assessment & Plan  · Not on medication at home  · We will order Pepcid and Tums as needed in view of IV Solu-Medrol  · Monitor    Essential hypertension  Assessment & Plan  · On metoprolol, Demadex at home  Continue home medications  · BP elevated on ED arrival   · Blood pressure has been stable    Mixed hyperlipidemia  Assessment & Plan  · Continue statin         VTE Pharmacologic Prophylaxis: VTE Score: 5 High Risk (Score >/= 5) - Pharmacological DVT Prophylaxis Ordered: apixaban (Eliquis)  Sequential Compression Devices Ordered  Patient Centered Rounds: I performed bedside rounds with nursing staff today    Discussions with Specialists or Other Care Team Provider: Yes    Education and Discussions with Family / Patient: yes    Total Time Spent on Date of Encounter in care of patient: 45 minutes This time was spent on one or more of the following: performing physical exam; counseling and coordination of care; obtaining or reviewing history; documenting in the medical record; reviewing/ordering tests, medications or procedures; communicating with other healthcare professionals and discussing with patient's family/caregivers  Current Length of Stay: 0 day(s)  Current Patient Status: Observation   Certification Statement: The patient will continue to require additional inpatient hospital stay due to Asthma exacerbation  Discharge Plan: Anticipate discharge in 48-72 hrs to home  Code Status: Level 1 - Full Code    Subjective:   Patient has improved shortness of breath  Denies any cough  Denies any chest pain  Objective:     Vitals:   Temp (24hrs), Av 9 °F (36 6 °C), Min:97 °F (36 1 °C), Max:98 9 °F (37 2 °C)    Temp:  [97 °F (36 1 °C)-98 9 °F (37 2 °C)] 97 5 °F (36 4 °C)  HR:  [] 92  Resp:  [20-22] 22  BP: (117-186)/(70-83) 123/76  SpO2:  [94 %-100 %] 97 %  Body mass index is 46 77 kg/m²  Input and Output Summary (last 24 hours): Intake/Output Summary (Last 24 hours) at 2023 1458  Last data filed at 2023 1152  Gross per 24 hour   Intake 300 ml   Output 1150 ml   Net -850 ml       Physical Exam:   Physical Exam  Constitutional:       Appearance: Normal appearance  HENT:      Head: Normocephalic and atraumatic  Nose: Nose normal       Mouth/Throat:      Mouth: Mucous membranes are moist       Pharynx: Oropharynx is clear  Eyes:      Extraocular Movements: Extraocular movements intact  Pupils: Pupils are equal, round, and reactive to light  Cardiovascular:      Rate and Rhythm: Normal rate and regular rhythm  Pulmonary:      Effort: Pulmonary effort is normal       Comments: Decreased breath sounds bilaterally  Abdominal:      General: Bowel sounds are normal  There is no distension  Palpations: Abdomen is soft  Tenderness: There is no abdominal tenderness     Musculoskeletal:         General: No swelling  Cervical back: Normal range of motion and neck supple  Right lower leg: Edema present  Left lower leg: Edema present  Skin:     General: Skin is warm and dry  Neurological:      General: No focal deficit present  Mental Status: She is alert  Additional Data:     Labs:  Results from last 7 days   Lab Units 04/28/23  1746   WBC Thousand/uL 9 70   HEMOGLOBIN g/dL 11 6   HEMATOCRIT % 35 5   PLATELETS Thousands/uL 226   NEUTROS PCT % 58   LYMPHS PCT % 22   MONOS PCT % 11   EOS PCT % 8*     Results from last 7 days   Lab Units 04/29/23  0702 04/28/23  1746   SODIUM mmol/L 139 141   POTASSIUM mmol/L 4 8 3 8   CHLORIDE mmol/L 101 98   CO2 mmol/L 30 32   BUN mg/dL 37* 39*   CREATININE mg/dL 1 67* 1 79*   ANION GAP mmol/L 8 11   CALCIUM mg/dL 8 1* 8 8   ALBUMIN g/dL  --  3 8   TOTAL BILIRUBIN mg/dL  --  0 30   ALK PHOS U/L  --  57   ALT U/L  --  15   AST U/L  --  18   GLUCOSE RANDOM mg/dL 288* 224*         Results from last 7 days   Lab Units 04/29/23  1101 04/29/23  0732 04/29/23  0221 04/28/23  2307 04/28/23  2234   POC GLUCOSE mg/dl 336* 277* 351* 448* 394*         Results from last 7 days   Lab Units 04/29/23  0702 04/28/23  1746   PROCALCITONIN ng/ml 0 06 0 06       Lines/Drains:  Invasive Devices     Peripheral Intravenous Line  Duration           Peripheral IV 04/28/23 Right Antecubital <1 day                  Telemetry:  Telemetry Orders (From admission, onward)             24 Hour Telemetry Monitoring  Continuous x 24 Hours (Telem)        References:    Telemetry Guidelines   Question:  Reason for 24 Hour Telemetry  Answer:  Metabolic/Electrolyte Disturbance with High Probability of Dysrhythmia (K level <3 or >6, or KCL infusion >10mEq/hr)                 Telemetry Reviewed: Normal Sinus Rhythm  Indication for Continued Telemetry Use: No indication for continued use  Will discontinue                Imaging: Reviewed radiology reports from this admission including: chest xray    Recent Cultures (last 7 days):         Last 24 Hours Medication List:   Current Facility-Administered Medications   Medication Dose Route Frequency Provider Last Rate    acetaminophen  650 mg Oral Q6H PRN Cuiedward Cherryririma, CRNP      apixaban  5 mg Oral BID Cuiyisay Yurik, CRNP      azithromycin  500 mg Oral Q24H Cuiedward Cherryrik, CRNP      calcium carbonate  1,000 mg Oral BID PRN Cuiedward Cherryrik, CRNP      famotidine  20 mg Oral HS Cuiedward Cherryrik, CRNP      fluticasone  1 spray Nasal Daily Cuiedward Cherryrik, CRNP      Fluticasone Furoate-Vilanterol  1 puff Inhalation Daily Cuiedward Palencia, CRDALILA      guaiFENesin  1,200 mg Oral BID Cuiedward Palencia, CRDALILA      insulin glargine  32 Units Subcutaneous Q12H [address] Georgi Dye MD      insulin lispro  1-5 Units Subcutaneous HS Cuiedward Palencia, CRDALILA      insulin lispro  1-5 Units Subcutaneous 0200 Cuiedward Palencia, CRNP      insulin lispro  1-6 Units Subcutaneous TID AC Cuiedward Palencia, CRNP      insulin lispro  14 Units Subcutaneous TID With Meals Cuissac Palencia, RK      ipratropium  0 5 mg Nebulization TID Cuissac Palencia, CRDALILA      levalbuterol  0 63 mg Nebulization Q6H PRN Cuissac Palencia, CRDALILA      levalbuterol  1 25 mg Nebulization TID Cuissac Palencia, CRDALILA      methylPREDNISolone sodium succinate  40 mg Intravenous Q12H [address] Cuiedward Palencia, CRDALILA      metoprolol succinate  50 mg Oral BID Sandra Taveras PA-C      montelukast  10 mg Oral Daily Cuiedward Palencia, RK      nystatin   Topical BID Brent Chisholm MD      pravastatin  80 mg Oral Daily With Kd Darden, RK      pregabalin  100 mg Oral BID Cuiedward Palencia, CRDALILA      torsemide  40 mg Oral Daily Cuiedward Palencia, CRDALILA          Today, Patient Was Seen By: Brent Chisholm MD    **Please Note: This note may have been constructed using a voice recognition system  **

## 2023-04-29 NOTE — ASSESSMENT & PLAN NOTE
· On metoprolol, Demadex at home  Continue home medications    · BP elevated on ED arrival   · Improving  · Monitor

## 2023-04-29 NOTE — ASSESSMENT & PLAN NOTE
· Suspect multifactorial secondary to asthma, sleep apnea, restrictive pulmonary disease, obesity hypoventilation syndrome  · At baseline does not wear oxygen during the day  · ABG showed normal pH, PCO2 45 7  · Patient has been stable on room air  · Continue CPAP with oxygen at HS as below

## 2023-04-29 NOTE — ASSESSMENT & PLAN NOTE
· Not on medication at home    · We will order Pepcid and Tums as needed in view of IV Solu-Medrol  · Monitor

## 2023-04-29 NOTE — ASSESSMENT & PLAN NOTE
· Multiple bilateral pulmonary nodules on CT  Known on prior CT    · Follow-up PCP/pulmonary as outpatient

## 2023-04-29 NOTE — PLAN OF CARE
Problem: MOBILITY - ADULT  Goal: Maintain or return to baseline ADL function  Description: INTERVENTIONS:  -  Assess patient's ability to carry out ADLs; assess patient's baseline for ADL function and identify physical deficits which impact ability to perform ADLs (bathing, care of mouth/teeth, toileting, grooming, dressing, etc )  - Assess/evaluate cause of self-care deficits   - Assess range of motion  - Assess patient's mobility; develop plan if impaired  - Assess patient's need for assistive devices and provide as appropriate  - Encourage maximum independence but intervene and supervise when necessary  - Involve family in performance of ADLs  - Assess for home care needs following discharge   - Consider OT consult to assist with ADL evaluation and planning for discharge  - Provide patient education as appropriate  Outcome: Progressing  Goal: Maintains/Returns to pre admission functional level  Description: INTERVENTIONS:  - Perform BMAT or MOVE assessment daily    - Set and communicate daily mobility goal to care team and patient/family/caregiver  - Collaborate with rehabilitation services on mobility goals if consulted  - Perform Range of Motion 4 times a day    - Reposition patient every 2 hours  - Out of bed for meals 3 times a day  - Out of bed for toileting  - Record patient progress and toleration of activity level   Outcome: Progressing     Problem: RESPIRATORY - ADULT  Goal: Achieves optimal ventilation and oxygenation  Description: INTERVENTIONS:  - Assess for changes in respiratory status  - Assess for changes in mentation and behavior  - Position to facilitate oxygenation and minimize respiratory effort  - Oxygen administered by appropriate delivery if ordered  - Initiate smoking cessation education as indicated  - Encourage broncho-pulmonary hygiene including cough, deep breathe, Incentive Spirometry  - Assess the need for suctioning and aspirate as needed  - Assess and instruct to report SOB or any respiratory difficulty  - Respiratory Therapy support as indicated  Outcome: Progressing     Problem: PAIN - ADULT  Goal: Verbalizes/displays adequate comfort level or baseline comfort level  Description: Interventions:  - Encourage patient to monitor pain and request assistance  - Assess pain using appropriate pain scale  - Administer analgesics based on type and severity of pain and evaluate response  - Implement non-pharmacological measures as appropriate and evaluate response  - Consider cultural and social influences on pain and pain management  - Notify physician/advanced practitioner if interventions unsuccessful or patient reports new pain  Outcome: Progressing     Problem: INFECTION - ADULT  Goal: Absence or prevention of progression during hospitalization  Description: INTERVENTIONS:  - Assess and monitor for signs and symptoms of infection  - Monitor lab/diagnostic results  - Monitor all insertion sites, i e  indwelling lines  - Administer medications as ordered  - Instruct and encourage patient and family to use good hand hygiene technique    Outcome: Progressing     Problem: SAFETY ADULT  Goal: Patient will remain free of falls  Description: INTERVENTIONS:  - Educate patient/family on patient safety including physical limitations  - Instruct patient to call for assistance with activity   - Consult OT/PT to assist with strengthening/mobility   - Keep Call bell within reach  - Keep bed low and locked with side rails adjusted as appropriate  - Keep care items and personal belongings within reach  - Initiate and maintain comfort rounds  - Make Fall Risk Sign visible to staff  - Offer Toileting every  2 Hours, in advance of need  - Initiate/Maintain bed/chair alarm    - Apply yellow socks and bracelet for high fall risk patients  - Consider moving patient to room near nurses station  Outcome: Progressing     Problem: DISCHARGE PLANNING  Goal: Discharge to home or other facility with appropriate resources  Description: INTERVENTIONS:  - Identify barriers to discharge w/patient and caregiver  - Arrange for needed discharge resources and transportation as appropriate  - Identify discharge learning needs (meds, etc )  - Arrange for interpretive services to assist at discharge as needed  - Refer to Case Management Department for coordinating discharge planning if the patient needs post-hospital services based on physician/advanced practitioner order or complex needs related to functional status, cognitive ability, or social support system  Outcome: Progressing     Problem: CARDIOVASCULAR - ADULT  Goal: Absence of cardiac dysrhythmias or at baseline rhythm  Description: INTERVENTIONS:  - Continuous cardiac monitoring, vital signs, obtain 12 lead EKG if ordered  - Administer antiarrhythmic and heart rate control medications as ordered  - Monitor electrolytes and administer replacement therapy as ordered  Outcome: Progressing

## 2023-04-29 NOTE — ASSESSMENT & PLAN NOTE
· Heart rate was up to 150s in ED on exam   EKG showed sinus tach, rate 111  · Heart rate up to 130s on the floor  EKGs showed SVT, rate 130-140s  · Will order Cardizem 15mg IV x1   · Continue metoprolol from home  · Optimize electrolytes  · Avoid albuterol as above  · Consult cardiology in view of frequent episodes of PSVT

## 2023-04-29 NOTE — ASSESSMENT & PLAN NOTE
· Heart rate was up to 150s in ED on exam   EKG showed sinus tach, rate 111  · Heart rate up to 130s on the floor  EKGs showed SVT, rate 130-140s    · Received Cardizem 15 mg IV x1 dose  · Patient is on Toprol-XL 50 mg p o  daily at home  · Blood pressures were repleted and magnesium and potassium levels are normal this morning  · Cardiology input appreciated-Toprol-XL increased to 50 mg p o  twice daily

## 2023-04-29 NOTE — H&P
[address]  H&P  Name: Rizwan Hussein 81 y o  female I MRN: [id number]  Unit/Bed#: 4 [place] [address]-01 I Date of Admission: 4/28/2023   Date of Service: 4/29/2023 I Hospital Day: 0      Assessment/Plan   * Moderate persistent asthma with acute exacerbation  Assessment & Plan  Patient presents with worsening SOB, worsening cough from baseline for past 2 days  Reports lots of wheezing  Denies fever, chills  Reports history of COPD and asthma, uses Wixela inhaler once a day and albuterol neb 3 times a day at home  · Patient was hospitalized 1 month ago for acute on chronic respiratory failure secondary to reactive airway disease with acute exacerbation  · Patient satting well on room air in ED  · Eosinophils 8  Suspect asthma exacerbation  · CT chest - No focal consolidation or groundglass opacity  Small scattered bilateral pulmonary nodules measuring up to 0 5 cm  New trace pericardial effusion  · Received IV Solu-Medrol, 1 hour neb, Zithromax  · Will continue IV Solu-Medrol 40 every 8 hours  · Continue Zithromax for 2 more days  · Start Mucinex  · Continue Singulair  · Xopenex/Atrovent 3 times daily, Xopenex as needed  · Sputum Gram stain and culture if able  · Check peak flow  · Consult pulmonary    COPD (chronic obstructive pulmonary disease) (HCC)  Assessment & Plan  · On Wixela inhaler daily, albuterol 3 times daily at home  · Substitute Wixela to Breo  · Continue Singulair, Flonase  · Neb treatment as above    Chronic respiratory failure with hypoxia and hypercapnia (HCC)  Assessment & Plan  · Suspect multifactorial secondary to COPD, asthma, sleep apnea, restrictive pulmonary disease, obesity hypoventilation syndrome  · At baseline does not wear oxygen during the day  · ABG showed normal pH, PCO2 45 7  · Patient satting well on room air in ED  · Continue CPAP with oxygen at HS as below      Chronic diastolic heart failure (HCC)  Assessment & Plan  Wt Readings from Last 3 Encounters:   04/28/23 116 kg (256 lb 6 4 oz)   04/28/23 117 kg (259 lb)   04/18/23 115 kg (253 lb)   · On torsemide 40 mg p o  daily at home  · 2D echo last month showed EF 50 to 21%, diastolic function is normal   PA pressure 39  No pericardial effusion  · Chronic bilateral leg edema per patient  · BNP 24  · CT showed - New trace pericardial effusion  · Will check limited echo to assess pericardial effusion  · Continue torsemide   · Check lower venous Doppler to rule out DVT  Unlikely as patient is on Eliquis  CKD (chronic kidney disease)  Assessment & Plan  · Baseline creatinine appears around 1 5-1 7s  · Continue stable  · Monitor    Obstructive sleep apnea  Assessment & Plan  · Had recent repeat sleep study  Just got a new machine and started to use it last night  Was on oxygen 2L with machine at bedtime, was cut down to 1 L with the new machine  · Reviewed pulmonary note  Patient was ordered CPAP at 7 cmH2O with 1 L of oxygen at bedtime  · Patient was satting 86 to 87% with above settings per RT  Will increase oxygen to 2 L and monitor patient  Type 2 diabetes mellitus with hyperglycemia, without long-term current use of insulin [facility])  Assessment & Plan  Lab Results   Component Value Date    HGBA1C 8 1 (H) 03/25/2023       Recent Labs     04/28/23  2234 04/28/23  2307   POCGLU 394* 448*       Blood Sugar Average: Last 72 hrs:  (P) 421   · A1c 8 1 last month  · On Lantus 25 units subcu twice daily, Humalog 10 units twice daily with SSI, Ozempic at home  · Glucose 224  Anticipate hyperglycemia with steroids  · Increase Lantus to 28 units SQ twice daily  · Increase Humalog to 14 units 3 times daily  · SSI  · Hold Ozempic while inpatient  · Diabetic diet    PSVT (paroxysmal supraventricular tachycardia) (HCC)  Assessment & Plan  · Heart rate was up to 150s in ED on exam   EKG showed sinus tach, rate 111  · Heart rate up to 130s on the floor  EKGs showed SVT, rate 130-140s    · Will order Cardizem 15mg IV x1   · Continue metoprolol from home  · Optimize electrolytes  · Avoid albuterol as above  · Consult cardiology in view of frequent episodes of PSVT  Mass overlapping multiple quadrants of right breast  Assessment & Plan  · Patient is scheduled for lumpectomy on May 2  Will need to be postponed due to # 1  Lung nodule  Assessment & Plan  · Multiple bilateral pulmonary nodules on CT  Known on prior CT  · Follow-up PCP/pulmonary as outpatient    Gastroesophageal reflux disease  Assessment & Plan  · Not on medication at home  · We will order Pepcid and Tums as needed in view of IV Solu-Medrol  · Monitor    Essential hypertension  Assessment & Plan  · On metoprolol, Demadex at home  Continue home medications  · BP elevated on ED arrival   · Improving  · Monitor    Mixed hyperlipidemia  Assessment & Plan  · Continue statin    Paroxysmal atrial fibrillation (HCC)  Assessment & Plan  · On metoprolol, Eliquis at home  We will continue  · EKG sinus rhythm to sinus tach in ED  · Avoid albuterol neb  · Xopenex as above  · Optimize electrolytes  Potassium 3 8, mag 1 6  Replete  · Telemetry for 24 hours    Morbid obesity with BMI of 45 0-49 9, adult (HCC)  Assessment & Plan  · Body mass index is 46 9 kg/m²  · Diet and lifestyle modification         VTE Prophylaxis: Apixaban (Eliquis)  / reason for no mechanical VTE prophylaxis On Eliquis   Code Status: Full code  POLST: POLST form is not discussed and not completed at this time  Anticipated Length of Stay:  Patient will be admitted on an Observation basis with an anticipated length of stay of  < 2 midnights  Justification for Hospital Stay: Asthma exacerbation    Total Time for Visit, including Counseling / Coordination of Care: 45 minutes  Greater than 50% of this total time spent on direct patient counseling and coordination of care  Chief Complaint:   Worsening SOB/cough from baseline for 2 days, wheezing      History of Present Illness:    Frieda Hogue is a 81 y o  female with PMH of COPD, asthma, restrictive lung disease, obesity hypoventilation syndrome, CHF, A-fib, PSVT, CKD, hypertension, hyperlipidemia, type 2 diabetes, sleep apnea, right breast mass, lung nodules who presents with worsening SOB, worsening cough from baseline for past 2 days  Reports lots of wheezing  Denies fever, chills, runny nose, sore throat  Reports history of COPD and asthma, uses Wixela inhaler once a day and albuterol neb 3 times a day at home  Patient reports use BiPAP or CPAP at bedtime with oxygen for sleep apnea  Had a repeat sleep study recently  Just got a new machine and started to use last night  Was on oxygen 2 L with machine at night but was cut down to 1 L with the new machine  Patient denies oxygen during the day  Patient reports she has right breast mass, scheduled to have lumpectomy on May 2  Patient denies chest pain, headache, dizziness, nausea vomiting diarrhea constipation  Reports chronic leg edema  Patient offered no other complaints  Review of Systems:    Review of Systems   Respiratory: Positive for cough, shortness of breath and wheezing  Cardiovascular: Positive for leg swelling  All other systems reviewed and are negative        Past Medical and Surgical History:     Past Medical History:   Diagnosis Date    SE[facility]STIAN [facility]) ([facility] ) 01/23/2023    Anemia     Asthma     Atrial fibrillation (HCC)     Chronic kidney disease     COPD (chronic obstructive pulmonary disease) ([facility] )     COVID-19     January 2022    Diabetes mellitus ([place] [address] )     GERD (gastroesophageal reflux disease)     Hyperlipidemia     Hypertension     PONV (postoperative nausea and vomiting)     Sleep apnea     wear BIPAP    SVT (supraventricular tachycardia) ([place] [address] )        Past Surgical History:   Procedure Laterality Date    APPENDECTOMY      BREAST BIOPSY Right     years ago when she was 26    BREAST BIOPSY Right 03/13/2023  CYSTOSCOPY W/ LASER LITHOTRIPSY Left 07/12/2016    Procedure: CYSTOSCOPY URETEROSCOPY WITH LITHOTRIPSY HOLMIUM LASER, RETROGRADE PYELOGRAM AND INSERTION STENT URETERAL;  Surgeon: Za Alcaraz MD;  Location: [address] [place] [address];  Service:     DILATION AND CURETTAGE OF UTERUS      IR THORACENTESIS  03/18/2022    JOINT REPLACEMENT      right knee    KNEE ARTHROPLASTY Right     MAMMO NEEDLE LOCALIZATION LEFT (ALL INC) EACH ADD Right 4/24/2023    MAMMO STEREOTACTIC BREAST BIOPSY RIGHT (ALL INC) Right 03/13/2023    High Risk Lesion    OH ARTHROPLASTY GLENOHUMERAL JOINT TOTAL SHOULDER Left 03/01/2022    Procedure: ARTHROPLASTY SHOULDER REVERSE;  Surgeon: Ariela Ceballos MD;  Location: [address] [place] [address];  Service: Orthopedics    OH CYSTO BLADDER W/URETERAL CATHETERIZATION Left 06/29/2016    Procedure: CYSTOSCOPY RETROGRADE PYELOGRAM WITH INSERTION STENT URETERAL, left;  Surgeon: Za Alcaraz MD;  Location: [facility];  Service: Urology    SHOULDER ARTHROTOMY Left        Meds/Allergies:    Prior to Admission medications    Medication Sig Start Date End Date Taking? Authorizing Provider   albuterol (PROVENTIL HFA,VENTOLIN HFA) 90 mcg/act inhaler Inhale 2 puffs every 6 (six) hours as needed for wheezing   Yes Historical Provider, MD   apixaban (ELIQUIS) 5 mg Take 5 mg by mouth 2 (two) times a day   Yes Historical Provider, MD   fluticasone (FLONASE) 50 mcg/act nasal spray 1 spray into each nostril daily 11/3/22  Yes RK Ace   Fluticasone-Salmeterol (Wixela Inhub) 250-50 mcg/dose inhaler Inhale 1 puff 2 (two) times a day Rinse mouth after use  Patient taking differently: Inhale 1 puff 2 (two) times a day as needed Rinse mouth after use   4/4/23 5/4/23 Yes RK Azar   insulin glargine (Toujeo SoloStar) 300 units/mL CONCENTRATED U-300 injection pen (1-unit dial) Inject 25 Units under the skin every 12 (twelve) hours   Yes Historical Provider, MD   insulin lispro (HumaLOG KwikPen) 100 units/mL injection pen Use 10 units prior to each meal +1 unit per 50 above 150 mg/dL 2/27/23  Yes Willow Beckwith MD   metoprolol succinate (TOPROL-XL) 50 mg 24 hr tablet take 1 tablet by mouth once daily 4/3/23  Yes Janel Henriquez MD   montelukast (SINGULAIR) 10 mg tablet Take 10 mg by mouth daily at bedtime   Yes Historical Provider, MD   pregabalin (LYRICA) 100 mg capsule take 1 capsule by mouth twice a day 4/24/23  Yes Janel Henriquez MD   rosuvastatin (CRESTOR) 10 MG tablet take 1 tablet by mouth once daily 1/3/23  Yes Janel Henriquez MD   torsemide [facility]) 20 mg tablet Take 2 tablets in the AM 9/9/22  Yes Parris Ulloa MD   Albuterol Sulfate (albuterol, FOR EMS ONLY,) (2 5 mg/3 mL) 0 083 % nebulizer solution Take 2 5 mg by nebulization every 6 (six) hours as needed for wheezing    Historical Provider, MD   ammonium lactate (LAC-HYDRIN) 12 % lotion APPLY TOPICALLY TO AFFECTED AREAS twice a day 12/16/21   Historical Provider, MD   B Complex-C-Folic Acid (triphrocaps) 1 MG CAPS take 1 capsule by mouth once daily  Patient not taking: Reported on 4/28/2023 12/27/22   Janel Henriquez MD   docusate sodium (COLACE) 100 mg capsule Take 100 mg by mouth in the morning  Patient not taking: Reported on 4/28/2023    Historical Provider, MD   glucose blood (OneTouch Verio) test strip Use 1 each 4 (four) times a day Use as instructed 3/29/23   Janel Henriquez MD   Insulin Pen Needle (BD Pen Needle Pilar 2nd Gen) 32G X 4 MM MISC For use with insulin pen  Pharmacy may dispense brand covered by insurance   5/23/22   Donald Degroot MD   Insulin Pen Needle (NovoFine Autocover) 30G X 8 MM MISC Inject under the skin daily 11/8/22   Janel Henriquez MD   Insulin Pen Needle 30G X 8 MM MISC 2 (two) times a day    Historical Provider, MD   Insulin Pen Needle 31G X 8 MM MISC Use daily Inject under the skin 12/8/22 4/28/23  Janel Henriquez MD   Lancets (onetouch "ultrasoft) lancets Use once daily 22   Laisha Medrano MD   NovoFine Autocover Pen Needle 30G X 8 MM MISC USE TWICE A DAY 23   Laisha Medrano MD   nystatin (MYCOSTATIN) powder Apply topically 2 (two) times a day  Patient not taking: Reported on 2023 3/28/23   RK Lora   semaglutide, 1 mg/dose, (Ozempic, 1 MG/DOSE,) 4 mg/3 mL injection pen Inject 0 75 mL (1 mg total) under the skin every 7 days 3/24/23   Norma Torrez MD     I have reviewed home medications with patient personally  Allergies:    Allergies   Allergen Reactions    Penicillins Hives    Moxifloxacin Other (See Comments)     unknown    Oxycodone-Acetaminophen Other (See Comments)     GI upset    Zinc Acetate Other (See Comments)     unknown    Penicillins Hives    Asa [Aspirin] GI Intolerance    Indocin [Indomethacin] Other (See Comments)     Made patient \"loopy\"    Other Other (See Comments)     unknown    Tannic Acid Rash       Social History:     Marital Status: Single   Occupation: Retired  Patient Pre-hospital Living Situation: Family  Patient Pre-hospital Level of Mobility: Independent  Patient Pre-hospital Diet Restrictions: Cardiac diabetic  Substance Use History:   Social History     Substance and Sexual Activity   Alcohol Use Not Currently    Comment: rarely     Social History     Tobacco Use   Smoking Status Former    Packs/day: 1 00    Years: 20 00    Pack years: 20 00    Types: Cigarettes    Quit date: 1996    Years since quittin 8   Smokeless Tobacco Never     Social History     Substance and Sexual Activity   Drug Use Never       Family History:    non-contributory    Physical Exam:     Vitals:   Blood Pressure: 117/71 (23)  Pulse: (!) 138 (23)  Temperature: (!) 97 °F (36 1 °C) (23)  Temp Source: Temporal (23)  Respirations: 20 (23)  Weight - Scale: 116 kg (256 lb 6 4 oz) (23)  SpO2: 96 % (23 " 6119)    Physical Exam  Vitals and nursing note reviewed  Constitutional:       Appearance: She is well-developed  She is obese  HENT:      Head: Normocephalic and atraumatic  Neck:      Thyroid: No thyromegaly  Vascular: No JVD  Trachea: No tracheal deviation  Cardiovascular:      Rate and Rhythm: Regular rhythm  Tachycardia present  Heart sounds: Normal heart sounds  Comments: Heart rate up to 150s in ED during exam  Pulmonary:      Effort: No respiratory distress  Breath sounds: Wheezing present  No rales  Comments: Mild inspiratory wheezing in upper lobes, severely diminished breath sounds in lower lobes  On room air, satting well in ED  Abdominal:      General: Bowel sounds are normal  There is no distension  Palpations: Abdomen is soft  Tenderness: There is no abdominal tenderness  There is no guarding  Musculoskeletal:         General: Swelling present  Cervical back: Neck supple  Right lower leg: Edema present  Left lower leg: Edema present  Comments: 1+ lower extremity edema bilaterally   Skin:     General: Skin is warm and dry  Neurological:      General: No focal deficit present  Mental Status: She is alert and oriented to person, place, and time  Psychiatric:         Mood and Affect: Mood normal          Judgment: Judgment normal          Additional Data:     Lab Results: I have personally reviewed pertinent reports  Results from last 7 days   Lab Units 04/28/23  1746   WBC Thousand/uL 9 70   HEMOGLOBIN g/dL 11 6   HEMATOCRIT % 35 5   PLATELETS Thousands/uL 226   NEUTROS PCT % 58   LYMPHS PCT % 22   MONOS PCT % 11   EOS PCT % 8*     Results from last 7 days   Lab Units 04/28/23  1746   POTASSIUM mmol/L 3 8   CHLORIDE mmol/L 98   CO2 mmol/L 32   BUN mg/dL 39*   CREATININE mg/dL 1 79*   CALCIUM mg/dL 8 8   ALK PHOS U/L 57   ALT U/L 15   AST U/L 18           Imaging: I have personally reviewed pertinent reports        CT chest without contrast    Result Date: 4/28/2023  Narrative: CT CHEST WITHOUT IV CONTRAST INDICATION:   sob  Shortness of breath  COMPARISON: CT chest abdomen pelvis without contrast 3/26/2023  TECHNIQUE: CT examination of the chest was performed without intravenous contrast  Multiplanar 2D reformatted images were created from the source data  Radiation dose length product (DLP) for this visit:  1344 mGy-cm   This examination, like all CT scans performed in the [facility], was performed utilizing techniques to minimize radiation dose exposure, including the use of iterative reconstruction and automated exposure control  FINDINGS: LUNGS:  There is no tracheal or endobronchial lesion  No focal consolidation or groundglass opacity  Small scattered bilateral pulmonary nodules  3 reference nodules: - 0 5 cm left lower lobe solid pulmonary nodule (3:86), unchanged  - 0 5 cm right lower lobe solid pulmonary nodule (3:113), unchanged  - 0 5 cm right middle lobe solid pulmonary nodule (3:128), unchanged  PLEURA:  Unremarkable  HEART/GREAT VESSELS: Normal heart size  New trace pericardial effusion  Aortic valve and coronary artery calcifications  No thoracic aortic aneurysm  Mild scattered calcified atherosclerotic disease  MEDIASTINUM AND LUIGI:  Unremarkable  CHEST WALL AND LOWER NECK:  Unremarkable  VISUALIZED STRUCTURES IN THE UPPER ABDOMEN:  Unremarkable  OSSEOUS STRUCTURES:  No acute fracture or destructive osseous lesion  Left reverse shoulder total arthroplasty  Diffuse idiopathic skeletal hyperostosis (DISH)  Impression: New trace pericardial effusion  Recommend correlation with echocardiogram  Small scattered bilateral pulmonary nodules measuring up to 0 5 cm  Based on current Fleischner Society 2017 Guidelines on incidental pulmonary nodule, no routine follow-up is needed if the patient is low risk  If the patient is high risk, optional follow-up chest CT at 12 months can be considered   The study was marked in [facility] for immediate notification  Workstation performed: [id number]     Mammo evelyn  needle loc right (all inc) ea add    Result Date: 4/24/2023  Narrative: DIAGNOSIS: Atypical hyperplasia of right breast INDICATION: Nora Mcfadden is a 81 y o  female presenting for surgical planning for Evelyn  directed Right lumpectom  Prior to the procedure, previous imaging was reviewed  The procedure was explained to the patient in detail  All questions were answered  Written and verbal informed consent was obtained  FINDINGS: RIGHT A) CALCIFICATIONS: Images of the right breast calcifications in the outer central region at 9 o'clock in the middle portion were obtained  The patient was placed upright on the biopsy table  A clip placement was performed under  mammographic guidance using a lateral approach  After obtaining informed consent for the procedure at which time the risks and benefits were explained to the patient including bleeding, infection, and pneumothorax, a timeout was performed  The skin was prepped in the usual fashion  Anesthesia was administered using 5 mL of lidocaine 1%  A 16 G device was advanced through which the [employer] was deployed  Audible confirmation is made with a hand-held transducer held over the skin following deployment  Utilizing similar technique, the calcifications posterior and slightly cephalad to the initial biopsy site are localized for bracketed [facility] placement  Again, audible confirmation is confirmed with a hand-held transducer held over the skin following deployment  It should be noted because of the patient's breast size, audible confirmation is made for both sites at the undersurface of the breast   Post-placement digital mammographic imaging demonstrates the clips were at the sites  The patient tolerated the procedure well  There were no immediate complications         Impression:  Successful bracketed EVELYN  reflector deployment for high risk lesion in the central outer right breast  RECOMMENDATION:      - Surgical consultation for the right breast  Workstation ID: [id number]       EKG, Pathology, and Other Studies Reviewed on Admission:   · EKG: As above    Allscripts Records Reviewed: Yes     ** Please Note: Dragon 360 Dictation voice to text software may have been used in the creation of this document   **

## 2023-04-29 NOTE — ASSESSMENT & PLAN NOTE
Lab Results   Component Value Date    HGBA1C 8 1 (H) 03/25/2023       Recent Labs     04/28/23  2307 04/29/23  0221 04/29/23  0732 04/29/23  1101   POCGLU 448* 351* 277* 336*       Blood Sugar Average: Last 72 hrs:  (P) 361 2   · A1c 8 1 last month  · On Lantus 25 units subcu twice daily, Humalog 10 units twice daily with SSI, Ozempic at home  · Glucose 224  Anticipate hyperglycemia with steroids  · Patient currently on Lantus 28 units twice daily which was increased to 32 units twice daily  · Continue Humalog 14 units with meals  · Continue Humalog sliding scale with Accu-Cheks before every meal and at bedtime    · Uncontrolled blood sugars likely secondary to steroids  · Hold Ozempic while inpatient  · Diabetic diet

## 2023-04-29 NOTE — ASSESSMENT & PLAN NOTE
· Body mass index is 46 9 kg/m²     · Diet and lifestyle modification external memory aids to be oriented x4, min cues. SAM: independent  Date: min cues  Year: Independent  Month: Independent   Not addressed this session. GOAL MET   Other areas targeted: N/A N/A    Education:   Education completed re: purpose of task and cognitive skills being addressed. Pt could benefit from reinforcement. Education completed re: recall strategies. Pt could benefit from reinforcement. Safety Devices: [x] Call light within reach  [x] Chair alarm activated  [] Bed alarm activated  [x] Other: Pt's belongings within reach. [x] Call light within reach  [x] Chair alarm activated  [] Bed alarm activated  [] Other:     Assessment: D/C Note: Pt pleasant and cooperative. Slow progress towards goals. Pt continues to requires mod cues for problem solving, reasoning , and though organization tasks. Pt will require 24-hour supervision/ assistance with finances, medications, etc. upon discharge. Pt may benefit from cognitive-linguistic therapy and caregiver training at next level of care to increase safety and independence. Plan: Pt may benefit from continued cognitive-linguistic therapy to increase safety and independence in daily activities. Additional Information:     Barriers toward progress: Cognitive deficit and Limited safety awareness  Discharge recommendations:  [] Home independently  [] Home with assistance [x]  24 hour supervision  [] ECF [] Other:  Continued Tx Upon Discharge: ? [x] Yes [] No [x] TBD based on progress while on ARU [] Vital Stim indicated [] Other:   Estimated discharge date: 01/10/2020    Interventions used this date:  [] Speech/Language Treatment  [] Instruction in HEP [] Group [] Dysphagia Treatment [x] Cognitive Treatment   [] Other: Total Time Breakdown / Charges    Time in Time out Total Time / units   Cognitive Tx 1010  1400 1030  1440 20 min/1 unit  40 min/3 units   Speech Tx -- -- --   Dysphagia Tx -- -- --       Electronically Signed by     Alfred Emanuel BRADLEY [facility] Pathologist

## 2023-04-29 NOTE — ASSESSMENT & PLAN NOTE
· On metoprolol, Eliquis at home  We will continue  · EKG sinus rhythm to sinus tach in ED  · Avoid albuterol neb  · Xopenex as above  · Optimize electrolytes  Potassium 3 8, mag 1 6  Replete  · Electrolytes normalized with replacement

## 2023-04-29 NOTE — ASSESSMENT & PLAN NOTE
Wt Readings from Last 3 Encounters:   04/29/23 116 kg (255 lb 11 7 oz)   04/28/23 117 kg (259 lb)   04/18/23 115 kg (253 lb)   · On torsemide 40 mg p o  daily at home  · 2D echo last month showed EF 50 to 01%, diastolic function is normal   PA pressure 39  No pericardial effusion    · Chronic bilateral leg edema per patient  · BNP 24  · CT showed - New trace pericardial effusion  · Limited 2D echo showed trivial pericardial effusion with normal systolic function  · Continue torsemide   · Follow-up lower extremity venous Dopplers

## 2023-04-29 NOTE — ASSESSMENT & PLAN NOTE
Patient presents with worsening SOB, worsening cough from baseline for past 2 days  Reports lots of wheezing  Denies fever, chills  Reports history of COPD and asthma, uses Wixela inhaler once a day and albuterol neb 3 times a day at home  · Patient was hospitalized 1 month ago for acute on chronic respiratory failure secondary to reactive airway disease with acute exacerbation  · Patient satting well on room air in ED  · Eosinophils 8  Suspect asthma exacerbation  · CT chest - No focal consolidation or groundglass opacity  Small scattered bilateral pulmonary nodules measuring up to 0 5 cm  New trace pericardial effusion  · Received IV Solu-Medrol, 1 hour neb, Zithromax  · Continue Solu-Medrol 40 mg IV every 8 hours for today  · Continue Zithromax for 2 more days  · Continue Mucinex and Singulair    Patient is on [facility] [place] [address] [address] which is substituted with Breo  · Continue Xopenex/Atrovent 3 times daily, Xopenex as needed  · Sputum Gram stain and culture if able  · Monitor peak flows  · Pulmonary input pending

## 2023-04-29 NOTE — PLAN OF CARE
Problem: MOBILITY - ADULT  Goal: Maintain or return to baseline ADL function  Description: INTERVENTIONS:  -  Assess patient's ability to carry out ADLs; assess patient's baseline for ADL function and identify physical deficits which impact ability to perform ADLs (bathing, care of mouth/teeth, toileting, grooming, dressing, etc )  - Assess/evaluate cause of self-care deficits   - Assess range of motion  - Assess patient's mobility; develop plan if impaired  - Assess patient's need for assistive devices and provide as appropriate  - Encourage maximum independence but intervene and supervise when necessary  - Involve family in performance of ADLs  - Assess for home care needs following discharge   - Consider OT consult to assist with ADL evaluation and planning for discharge  - Provide patient education as appropriate  Outcome: Progressing  Goal: Maintains/Returns to pre admission functional level  Description: INTERVENTIONS:  - Perform BMAT or MOVE assessment daily    - Set and communicate daily mobility goal to care team and patient/family/caregiver  - Collaborate with rehabilitation services on mobility goals if consulted  - Perform Range of Motion 4 times a day    - Reposition patient every 2 hours  - Out of bed for meals 3 times a day  - Out of bed for toileting  - Record patient progress and toleration of activity level   Outcome: Progressing     Problem: RESPIRATORY - ADULT  Goal: Achieves optimal ventilation and oxygenation  Description: INTERVENTIONS:  - Assess for changes in respiratory status  - Assess for changes in mentation and behavior  - Position to facilitate oxygenation and minimize respiratory effort  - Oxygen administered by appropriate delivery if ordered  - Initiate smoking cessation education as indicated  - Encourage broncho-pulmonary hygiene including cough, deep breathe, Incentive Spirometry  - Assess the need for suctioning and aspirate as needed  - Assess and instruct to report SOB or any respiratory difficulty  - Respiratory Therapy support as indicated  Outcome: Progressing     Problem: CARDIOVASCULAR - ADULT  Goal: Absence of cardiac dysrhythmias or at baseline rhythm  Description: INTERVENTIONS:  - Continuous cardiac monitoring, vital signs, obtain 12 lead EKG if ordered  - Administer antiarrhythmic and heart rate control medications as ordered  - Monitor electrolytes and administer replacement therapy as ordered  Outcome: Progressing     Problem: PAIN - ADULT  Goal: Verbalizes/displays adequate comfort level or baseline comfort level  Description: Interventions:  - Encourage patient to monitor pain and request assistance  - Assess pain using appropriate pain scale  - Administer analgesics based on type and severity of pain and evaluate response  - Implement non-pharmacological measures as appropriate and evaluate response  - Consider cultural and social influences on pain and pain management  - Notify physician/advanced practitioner if interventions unsuccessful or patient reports new pain  Outcome: Progressing     Problem: INFECTION - ADULT  Goal: Absence or prevention of progression during hospitalization  Description: INTERVENTIONS:  - Assess and monitor for signs and symptoms of infection  - Monitor lab/diagnostic results  - Monitor all insertion sites, i e  indwelling lines  - Administer medications as ordered  - Instruct and encourage patient and family to use good hand hygiene technique    Outcome: Progressing     Problem: SAFETY ADULT  Goal: Patient will remain free of falls  Description: INTERVENTIONS:  - Educate patient/family on patient safety including physical limitations  - Instruct patient to call for assistance with activity   - Consult OT/PT to assist with strengthening/mobility   - Keep Call bell within reach  - Keep bed low and locked with side rails adjusted as appropriate  - Keep care items and personal belongings within reach  - Initiate and maintain comfort rounds  - Make Fall Risk Sign visible to staff  - Offer Toileting every  2 Hours, in advance of need  - Initiate/Maintain bed/chair alarm    - Apply yellow socks and bracelet for high fall risk patients  - Consider moving patient to room near nurses station  Outcome: Progressing     Problem: DISCHARGE PLANNING  Goal: Discharge to home or other facility with appropriate resources  Description: INTERVENTIONS:  - Identify barriers to discharge w/patient and caregiver  - Arrange for needed discharge resources and transportation as appropriate  - Identify discharge learning needs (meds, etc )  - Arrange for interpretive services to assist at discharge as needed  - Refer to Case Management Department for coordinating discharge planning if the patient needs post-hospital services based on physician/advanced practitioner order or complex needs related to functional status, cognitive ability, or social support system  Outcome: Progressing

## 2023-04-29 NOTE — ASSESSMENT & PLAN NOTE
Wt Readings from Last 3 Encounters:   04/28/23 116 kg (256 lb 6 4 oz)   04/28/23 117 kg (259 lb)   04/18/23 115 kg (253 lb)   · On torsemide 40 mg p o  daily at home  · 2D echo last month showed EF 50 to 15%, diastolic function is normal   PA pressure 39  No pericardial effusion  · Chronic bilateral leg edema per patient  · BNP 24  · CT showed - New trace pericardial effusion  · Will check limited echo to assess pericardial effusion  · Continue torsemide   · Check lower venous Doppler to rule out DVT  Unlikely as patient is on Eliquis

## 2023-04-29 NOTE — ASSESSMENT & PLAN NOTE
· On metoprolol, Demadex at home  Continue home medications    · BP elevated on ED arrival   · Blood pressure has been stable

## 2023-04-29 NOTE — ASSESSMENT & PLAN NOTE
· Had recent repeat sleep study  Just got a new machine and started to use it last night  Was on oxygen 2L with machine at bedtime, was cut down to 1 L with the new machine  · Reviewed pulmonary note  Patient was ordered CPAP at 7 cmH2O with 1 L of oxygen at bedtime  · Patient was satting 86 to 87% with above settings per RT  Will increase oxygen to 2 L and monitor patient

## 2023-04-29 NOTE — CONSULTS
Consultation - Cardiology   [facility]     Omid Peraza 81 y o  female MRN: [id number]  : 1950  Unit/Bed#: 26189 [facility] 408- Encounter: [id number]      Assessment & Plan   1  PSVT  - Patient with documented history of PSVT  Episodes of PSVT during this hospitalization likely secondary to acute asthma exacerbation and hypomagnesium (1 6 on presentation)  - 23 EKG 1800 hrs: sinus rhythm, rate 83 bpm  Repeat EKG 23 0152 hrs: SVT, 142 bpm  - Telemetry reviewed: currently sinus rhythm, rate 90 bpm  Converted to SVT at 0057 hrs, converted to sinus tachycardia at 0211 hrs  Given cardizem 15 mg at 0233 hrs  Patient reports experiencing palpitations overnight which she states has resolved  - Will increase Toprol XL 50 mg daily to Toprol XL 50 mg twice daily    - Monitor electrolytes  Replete potassium above 4 and magnesium above 2    - Follow up TTE 23    - Continue telemetry  2  Pericardial effusion    - 23 CT chest w/o contrast: New trace pericardial effusion   - Patient currently hemodynamically stable  - Follow up TTE 23  3  Moderate persistent asthma with acute exacerbation      - Presented for evaluation for cough for several days and shortness of breath for two days  - 23 CXR: The lungs are clear  No pneumothorax or pleural effusion     - Pulmonology consult pending    - Care per primary team      4  Chronic respiratory failure with hypoxia and hypercapnia     - Likely multifactorial due to COPD, asthma, obesity hyperventilation syndrome, and restrictive lung disease    - Patient reports she does not use home O2    - Currently on 2 LPM nasal cannula, SpO2 %  5  Chronic diastolic heart failure  - Does not appear in acute phase    - Patient reports history of chronic B/L lower extremity edema    - Follows outpatient with HF specialist Dr Vinh Mccoy    - 23 BNP: 24   - 23 CXR: The lungs are clear    No pneumothorax or pleural effusion     - Follow up TTE 4/29/23    - 3/27/23 TTE: LVEF 50-55%  Diastolic function is normal  study date: 3/10/2022  No significant changes noted compared to the prior study  - Will increase Toprol XL 50 mg daily to Toprol XL 50 mg twice daily  - Continue torsemide 40 mg daily    - Monitor I/0s    - Daily weights    - CHF education    - Low sodium diet  6  Paroxysmal atrial fibrillation      - Patient with document history of paroxysmal atrial fibrillation    - [id number] stroke risk score: 5 points, moderate-high risk  - Continue eliquis 5 mg twice daily  - 5/19/22 Holter: Average heart rate 75 beats  No major SVT/afib/flutter  - Will increase Toprol XL 50 mg daily to Toprol XL 50 mg twice daily  - Continue telemetry  7  Obstructive sleep apnea  - Patient reports she is compliant with home CPAP  - Inpatient CPAP ordered  8  Obesity hyperventilation syndrome      - Patient reports she does not use home O2    - BMI: 46 77 kg/m2  9  Hypertension      - Noted hypertensive urgency on presentation, BP has since improved  - Will increase Toprol XL 50 mg daily to Toprol XL 50 mg twice daily  10  Mixed hyperlipidemia  - 12/20/22 lipid panel: cholesterol 155, triglycerides 259, HDL 42, LDL 61    - Continue pravastatin 80 mg daily  11  DMII      - 3/25/23 HgbA1C: 8 1    - Care per primary team      12  CKDIII  - Baseline creatinine 1 5-1 7    - Care per primary team    Summary of Recommendations: Thank you for your consultation  Physician Requesting Consult: Myra Carrillo MD    Reason for Consult / Principal Problem: PSVT, pericardial effusion       Inpatient consult to Cardiology  Consult performed by: Perri Wayne PA-C  Consult ordered by: RK Mittal          HPI: Mya Zacarias is a 81y o  year old female, class III obese, with PMHx of PSVT, chronic diastolic heart failure, paroxysmal atrial fibrillation (currently on Eliquis), HTN, mixed hyperlipidemia, COPD, asthma, restrictive lung disease, KATRINA (on CPAP), obesity hyperventilation syndrome, chronic respiratory failure with hypoxia and hypercapnia, DMII, GERD, CKDIII,  who presented for evaluation for cough for several days and shortness of breath for two days  She reports her cough was associated with wheezing and progressed to shortness of breath  She was evaluated by her PCP yesterday 4/28/23 who directed her to the ED  Patient currently admitted for moderate persistent asthma with acute exacerbation  Cardiology consulted for PSVT and trace pericardial effusion seen on CT chest  4/28/23 EKG 1800 hrs: sinus rhythm, rate 83 bpm  Repeat EKG 4/29/23 0152 hrs: SVT, 142 bpm  Telemetry reviewed: currently sinus rhythm, rate 90 bpm  Converted to SVT at 0057 hrs, converted to sinus tachycardia at 0211 hrs  Given cardizem 15 mg at 0233 hrs  Patient reports experiencing palpitations overnight which she states has resolved  4/28/23 CT chest demonstrated new trace pericardial effusion  Of note, patient reports she has chronic B/L lower extremity edema  She denies worsening B/L lower extremity recently  Review of Systems   Constitutional: Negative for activity change, chills, fever and unexpected weight change  Respiratory: Positive for cough, shortness of breath and wheezing  Negative for chest tightness  Cardiovascular: Positive for palpitations and leg swelling  Negative for chest pain  Gastrointestinal: Negative for abdominal distention, abdominal pain, constipation, diarrhea, nausea and vomiting  Skin: Negative  Neurological: Positive for light-headedness  Negative for dizziness, weakness, numbness and headaches         Historical Information   Past Medical History:   Diagnosis Date    SEBASTIAN [facility]) ([facility] ) 01/23/2023    Anemia     Asthma     Atrial fibrillation (HCC)     Chronic kidney disease     COPD (chronic obstructive pulmonary disease) ([facility] )  COVID-19     2022    Diabetes mellitus (HCC)     GERD (gastroesophageal reflux disease)     Hyperlipidemia     Hypertension     PONV (postoperative nausea and vomiting)     Sleep apnea     wear BIPAP    SVT (supraventricular tachycardia) (HCC)      Past Surgical History:   Procedure Laterality Date    APPENDECTOMY      BREAST BIOPSY Right     years ago when she was 26    BREAST BIOPSY Right 2023    CYSTOSCOPY W/ LASER LITHOTRIPSY Left 2016    Procedure: CYSTOSCOPY URETEROSCOPY WITH LITHOTRIPSY HOLMIUM LASER, RETROGRADE PYELOGRAM AND INSERTION STENT URETERAL;  Surgeon: Sravani Montesinos MD;  Location: [address] [place] [address];  Service:     DILATION AND CURETTAGE OF UTERUS      IR THORACENTESIS  2022    JOINT REPLACEMENT      right knee    KNEE ARTHROPLASTY Right     MAMMO NEEDLE LOCALIZATION LEFT (ALL INC) EACH ADD Right 2023    MAMMO STEREOTACTIC BREAST BIOPSY RIGHT (ALL INC) Right 2023    High Risk Lesion    IA ARTHROPLASTY GLENOHUMERAL JOINT TOTAL SHOULDER Left 2022    Procedure: ARTHROPLASTY SHOULDER REVERSE;  Surgeon: Melodie Ye MD;  Location: [facility];  Service: Orthopedics    IA CYSTO BLADDER W/URETERAL CATHETERIZATION Left 2016    Procedure: CYSTOSCOPY RETROGRADE PYELOGRAM WITH INSERTION STENT URETERAL, left;  Surgeon: Sravani Montesinos MD;  Location: [facility];  Service: Urology    SHOULDER ARTHROTOMY Left      Social History     Substance and Sexual Activity   Alcohol Use Not Currently    Comment: rarely     Social History     Substance and Sexual Activity   Drug Use Never     Social History     Tobacco Use   Smoking Status Former    Packs/day: 1 00    Years: 20 00    Pack years: 20 00    Types: Cigarettes    Quit date: 1996    Years since quittin 8   Smokeless Tobacco Never     Family History:   Family History   Problem Relation Age of Onset    Heart disease Mother     Diabetes Father     Thyroid cancer Sister "    Heart disease Brother     Diabetes Brother     Heart disease Brother     Heart disease Brother     Emphysema Maternal Grandmother     Heart disease Family        Meds/Allergies    PTA meds:    Medications Prior to Admission   Medication    albuterol (PROVENTIL HFA,VENTOLIN HFA) 90 mcg/act inhaler    apixaban (ELIQUIS) 5 mg    fluticasone (FLONASE) 50 mcg/act nasal spray    Fluticasone-Salmeterol (Wixela Inhub) 250-50 mcg/dose inhaler    insulin glargine (Toujeo SoloStar) 300 units/mL CONCENTRATED U-300 injection pen (1-unit dial)    insulin lispro (HumaLOG KwikPen) 100 units/mL injection pen    metoprolol succinate (TOPROL-XL) 50 mg 24 hr tablet    montelukast (SINGULAIR) 10 mg tablet    pregabalin (LYRICA) 100 mg capsule    rosuvastatin (CRESTOR) 10 MG tablet    torsemide (DEMADEX) 20 mg tablet    Albuterol Sulfate (albuterol, FOR EMS ONLY,) (2 5 mg/3 mL) 0 083 % nebulizer solution    ammonium lactate (LAC-HYDRIN) 12 % lotion    B Complex-C-Folic Acid (triphrocaps) 1 MG CAPS    docusate sodium (COLACE) 100 mg capsule    glucose blood (OneTouch Verio) test strip    Insulin Pen Needle (BD Pen Needle Pilar 2nd Gen) 32G X 4 MM MISC    Insulin Pen Needle (NovoFine Autocover) 30G X 8 MM MISC    Insulin Pen Needle 30G X 8 MM MISC    Insulin Pen Needle 31G X 8 MM MISC    Lancets (onetouch ultrasoft) lancets    NovoFine Autocover Pen Needle 30G X 8 MM MISC    nystatin (MYCOSTATIN) powder    semaglutide, 1 mg/dose, (Ozempic, 1 MG/DOSE,) 4 mg/3 mL injection pen      Allergies   Allergen Reactions    Penicillins Hives    Moxifloxacin Other (See Comments)     unknown    Oxycodone-Acetaminophen Other (See Comments)     GI upset    Zinc Acetate Other (See Comments)     unknown    Penicillins Hives    Asa [Aspirin] GI Intolerance    Indocin [Indomethacin] Other (See Comments)     Made patient \"loopy\"    Other Other (See Comments)     unknown    Tannic Acid Rash       Current " Facility-Administered Medications:     acetaminophen (TYLENOL) tablet 650 mg, 650 mg, Oral, Q6H PRN, RK Clements    apixaban (ELIQUIS) tablet 5 mg, 5 mg, Oral, BID, RK Clements, 5 mg at 04/29/23 0803    azithromycin (ZITHROMAX) tablet 500 mg, 500 mg, Oral, Q24H, RK Clements    calcium carbonate (TUMS) chewable tablet 1,000 mg, 1,000 mg, Oral, BID PRN, RK Clements    famotidine (PEPCID) tablet 20 mg, 20 mg, Oral, HS, RK Clements, 20 mg at 04/28/23 2306    fluticasone (FLONASE) 50 mcg/act nasal spray 1 spray, 1 spray, Nasal, Daily, RK Clements, 1 spray at 04/29/23 0804    Fluticasone Furoate-Vilanterol 100-25 mcg/actuation 1 puff, 1 puff, Inhalation, Daily, RK Clements, 1 puff at 04/29/23 0804    guaiFENesin (MUCINEX) 12 hr tablet 1,200 mg, 1,200 mg, Oral, BID, RK Clements, 1,200 mg at 04/29/23 0802    insulin glargine (LANTUS) subcutaneous injection 28 Units 0 28 mL, 28 Units, Subcutaneous, Q12H [address], RK Clements, 28 Units at 04/29/23 0802    insulin lispro (HumaLOG) 100 units/mL subcutaneous injection 1-5 Units, 1-5 Units, Subcutaneous, HS, RK Clements    insulin lispro (HumaLOG) 100 units/mL subcutaneous injection 1-5 Units, 1-5 Units, Subcutaneous, 0200, RK Clements, 3 Units at 04/29/23 0244    insulin lispro (HumaLOG) 100 units/mL subcutaneous injection 1-6 Units, 1-6 Units, Subcutaneous, TID AC, 4 Units at 04/29/23 0804 **AND** Fingerstick Glucose (POCT), , , TID AC, RK Clements    insulin lispro (HumaLOG) 100 units/mL subcutaneous injection 14 Units, 14 Units, Subcutaneous, TID With Meals, RK Clements, 14 Units at 04/29/23 0804    ipratropium (ATROVENT) 0 02 % inhalation solution 0 5 mg, 0 5 mg, Nebulization, TID, RK Clements, 0 5 mg at 04/29/23 0720    levalbuterol (XOPENEX) inhalation solution 0 63 mg, 0 63 mg, Nebulization, Q6H PRN, RK Clements    levalbuterol (XOPENEX) inhalation solution "1 25 mg, 1 25 mg, Nebulization, TID, Jennysay Palencia, MARLENENP, 1 25 mg at 04/29/23 0720    methylPREDNISolone sodium succinate (Solu-MEDROL) injection 40 mg, 40 mg, Intravenous, Q12H [facility], Osiris Arrietak, CRNP, 40 mg at 04/29/23 0803    metoprolol succinate (TOPROL-XL) 24 hr tablet 50 mg, 50 mg, Oral, Daily, Osiris Palencia, CRNP, 50 mg at 04/29/23 0803    montelukast (SINGULAIR) tablet 10 mg, 10 mg, Oral, Daily, Osiris Palencia, CRNP, 10 mg at 04/29/23 0803    nystatin (MYCOSTATIN) powder, , Topical, BID, Lisa Rojo MD    pravastatin (PRAVACHOL) tablet 80 mg, 80 mg, Oral, Daily With Dinner, RK Clements    pregabalin (LYRICA) capsule 100 mg, 100 mg, Oral, BID, Bekaiedward Cherryrik, CRNP, 100 mg at 04/29/23 0803    torsemide (DEMADEX) tablet 40 mg, 40 mg, Oral, Daily, Bekaiedward Cherryririma, CRNP, 40 mg at 04/29/23 0802    VTE Pharmacologic Prophylaxis:   Eliquis    Objective:   Vitals: Blood pressure 123/76, pulse 92, temperature 97 5 °F (36 4 °C), resp  rate 22, height 5' 2\" (1 575 m), weight 116 kg (255 lb 11 7 oz), SpO2 98 %, not currently breastfeeding  Body mass index is 46 77 kg/m²  Wt Readings from Last 3 Encounters:   04/29/23 116 kg (255 lb 11 7 oz)   04/28/23 117 kg (259 lb)   04/18/23 115 kg (253 lb)     BP Readings from Last 3 Encounters:   04/29/23 123/76   04/28/23 120/70   04/24/23 124/76     Orthostatic Blood Pressures    Flowsheet Row Most Recent Value   Blood Pressure 123/76 filed at 04/29/2023 0910          Intake/Output Summary (Last 24 hours) at 4/29/2023 1025  Last data filed at 4/28/2023 2300  Gross per 24 hour   Intake --   Output 450 ml   Net -450 ml       Invasive Devices     Peripheral Intravenous Line  Duration           Peripheral IV 04/28/23 Right Antecubital <1 day              Physical Exam:   Physical Exam  Vitals reviewed  Constitutional:       General: She is not in acute distress  Appearance: She is obese  Cardiovascular:      Rate and Rhythm: Normal rate and regular rhythm        " Pulses: Normal pulses  Heart sounds: Murmur heard  Pulmonary:      Effort: Pulmonary effort is normal  No respiratory distress  Breath sounds: Decreased breath sounds present  Abdominal:      General: Abdomen is flat  There is no distension  Palpations: Abdomen is soft  Tenderness: There is no abdominal tenderness  Musculoskeletal:      Right lower leg: Edema present  Left lower leg: Edema present  Skin:     General: Skin is warm and dry  Neurological:      Mental Status: She is alert and oriented to person, place, and time  Labs:   Troponins:  Results from last 7 days   Lab Units 04/28/23 1956   HSTNI D2 ng/L -2       CBC with diff:   Results from last 7 days   Lab Units 04/28/23  1746   WBC Thousand/uL 9 70   HEMOGLOBIN g/dL 11 6   HEMATOCRIT % 35 5   MCV fL 92   PLATELETS Thousands/uL 226   MCH pg 30 0   MCHC g/dL 32 7   RDW % 14 3   MPV fL 10 6   NRBC AUTO /100 WBCs 0       CMP:   Results from last 7 days   Lab Units 04/29/23  0702 04/28/23  1746   SODIUM mmol/L 139 141   POTASSIUM mmol/L 4 8 3 8   CHLORIDE mmol/L 101 98   CO2 mmol/L 30 32   ANION GAP mmol/L 8 11   BUN mg/dL 37* 39*   CREATININE mg/dL 1 67* 1 79*   GLUCOSE FASTING mg/dL 288*  --    CALCIUM mg/dL 8 1* 8 8   AST U/L  --  18   ALT U/L  --  15   ALK PHOS U/L  --  57   TOTAL PROTEIN g/dL  --  7 0   ALBUMIN g/dL  --  3 8   TOTAL BILIRUBIN mg/dL  --  0 30   EGFR ml/min/1 73sq m 30 28   GLUCOSE RANDOM mg/dL 288* 224*       Magnesium:   Results from last 7 days   Lab Units 04/29/23  0702 04/28/23  1746   MAGNESIUM mg/dL 2 3 1 6*     Coags:     TSH:      No components found for: TSH3  Lipid Profile:     Lipid Profile:   Lab Results   Component Value Date    CHOLESTEROL 155 12/20/2022    HDL 42 (L) 12/20/2022    LDLCALC 61 12/20/2022    TRIG 259 (H) 12/20/2022       Imaging & Testing     Cardiac testing:     Echo - 3/27/23     Left Ventricle Definity was used to outline the left ventricle    Left ventricular cavity size is normal  Wall thickness is increased  There is mild concentric hypertrophy  Systolic function is normal   Estimated ejection fraction is 50-55%  Although no diagnostic regional wall motion abnormality was identified, this possibility cannot be completely excluded on the basis of this study  Diastolic function is normal    Right Ventricle Right ventricular cavity size is normal  Systolic function is normal  Normal tricuspid annular plane systolic excursion (TAPSE) > 1 7 cm  Wall thickness is normal    Left Atrium The atrium is normal in size  Right Atrium The atrium is normal in size  Aortic Valve The aortic valve is trileaflet  The leaflets are not thickened  The leaflets are not calcified  The leaflets exhibit normal mobility  There is no evidence of regurgitation  The aortic valve has no significant stenosis  Mitral Valve Mitral valve structure is normal  There is trace regurgitation  There is no evidence of stenosis  Tricuspid Valve Tricuspid valve structure is normal  There is trace to mild regurgitation  There is no evidence of stenosis  The right ventricular systolic pressure is mildly elevated at 39 mmHg  Tatiana Jas Pulmonic Valve Pulmonic valve structure is normal  There is no evidence of regurgitation  There is no evidence of stenosis  Ascending Aorta The aortic root is normal in size  IVC/SVC The inferior vena cava is normal in size  Pericardium There is no pericardial effusion  The pericardium is normal in appearance  Results for orders placed during the hospital encounter of 08/26/22    NM myocardial perfusion spect (rx stress and/or rest)    Interpretation Summary    Stress ECG: No ST deviation is noted  The ECG was not diagnostic due to pharmacological (vasodilator) stress    Perfusion: There is a left ventricular perfusion defect that is small in size with mild reduction in uptake present in the anterior and apex location(s) that is fixed   The defect appears to be an artifact caused by breast attenuation    Stress Function: Left ventricular function post-stress is normal  Post-stress ejection fraction is 70 %  No ischemia seen  Breast attenuation and left arm in field of image  Pt unable to raise arm due to recent shoulder surgery  Results for orders placed during the hospital encounter of 19    Echo complete with contrast if indicated    Narrative  [address]  [address] [facility]  [address]  [phone]    Transthoracic Echocardiogram  2D, M-mode, Doppler, and Color Doppler    Study date:  29-May-2019    Patient: Wanda Antoine  MR number: [id number]  Account number: [de-identified]  : 1950  Age: 79 years  Gender: Female  Status: Inpatient  Location: Bedside  Height: 62 in  Weight: 250 6 lb  BP: 133/ 62 mmHg    Indications: Tachycardia    Diagnoses: I11 9 - Hypertensive heart disease without heart failure    Sonographer:  QUETA Busch  Referring Physician:  Oswaldo Silva MD  Group:  [facility]  Interpreting Physician:  Zeinab Santiago DO    SUMMARY    LEFT VENTRICLE:  Systolic function was normal by visual assessment  Ejection fraction was estimated to be 55 %  There were no regional wall motion abnormalities  Wall thickness was mildly to moderately increased  Doppler parameters were consistent with abnormal left ventricular relaxation (grade 1 diastolic dysfunction)  MITRAL VALVE:  There was mild regurgitation  AORTIC VALVE:  There was no evidence for stenosis  There was no regurgitation  TRICUSPID VALVE:  There was mild regurgitation  Pulmonary artery systolic pressure was within the normal range  HISTORY: PRIOR HISTORY: Diabetes,Diabetes, HTN, Hyperlipidemia, A Fib  PROCEDURE: The procedure was performed at the bedside  This was a routine study  The transthoracic approach was used  The study included complete 2D imaging, M-mode, complete spectral Doppler, and color Doppler   The heart rate was 77 bpm,  at the start of the study  Images were obtained from the parasternal, apical, subcostal, and suprasternal notch acoustic windows  Echocardiographic views were limited due to restricted patient mobility, poor patient compliance, poor  acoustic window availability, decreased penetration, and lung interference  This was a technically difficult study  LEFT VENTRICLE: Size was normal  Systolic function was normal by visual assessment  Ejection fraction was estimated to be 55 %  There were no regional wall motion abnormalities  Wall thickness was mildly to moderately increased  DOPPLER:  Doppler parameters were consistent with abnormal left ventricular relaxation (grade 1 diastolic dysfunction)  RIGHT VENTRICLE: The size was normal  Systolic function was normal     LEFT ATRIUM: The atrium was mildly dilated  RIGHT ATRIUM: Size was normal     MITRAL VALVE: Valve structure was normal  There was normal leaflet separation  No echocardiographic evidence for prolapse  DOPPLER: The transmitral velocity was within the normal range  There was no evidence for stenosis  There was mild  regurgitation  AORTIC VALVE: The valve was trileaflet  Leaflets exhibited normal thickness, normal cuspal separation, and sclerosis  DOPPLER: Transaortic velocity was within the normal range  There was no evidence for stenosis  There was no  regurgitation  TRICUSPID VALVE: The valve structure was normal  There was normal leaflet separation  DOPPLER: The transtricuspid velocity was within the normal range  There was mild regurgitation  Pulmonary artery systolic pressure was within the normal  range  Estimated peak PA pressure was 32 mmHg  PULMONIC VALVE: Leaflets exhibited normal thickness, no calcification, and normal cuspal separation  DOPPLER: The transpulmonic velocity was within the normal range  There was no regurgitation  PERICARDIUM: There was no thickening   There was no pericardial effusion  AORTA: The root exhibited normal size  PULMONARY ARTERY: The size was normal  The morphology appeared normal     SYSTEM MEASUREMENT TABLES    2D mode  AoR Diam 2D: 3 6 cm  LA Diam (2D): 3 9 cm  LA/Ao (2D): 1 08  FS (2D Teich): 26 9 %  IVSd (2D): 1 29 cm  LVDEV: 75 1 cmï¾³  LVESV: 35 3 cmï¾³  LVIDd(2D): 4 12 cm  LVISd (2D): 3 01 cm  LVOT Area 2D: 3 14 cmï¾²  LVPWd (2D): 1 2 cm  SV (Teich): 39 8 cmï¾³    Apical four chamber  LVEF A4C: 51 %    Unspecified Scan Mode  HANNA Cont Eq (Peak Gutierrez): 2 58 cmï¾²  LVOT Diam : 2 cm  LVOT Vmax: 963 mm/s  LVOT Vmax; Mean: 963 mm/s  Peak Grad ; Mean: 4 mm[Hg]  MV Peak A Gutierrez: 893 mm/s  MV Peak E Gutierrez  Mean: 805 mm/s  MVA (PHT): 3 67 cmï¾²  PHT: 60 ms  Max P mm[Hg]  V Max: 2360 mm/s  Vmax: 2430 mm/s  RA Area: 12 8 cmï¾²  RA Volume: 27 6 cmï¾³  TAPSE: 2 cm    [facility] Accredited [facility]chocardi[facility]    Prepared and electronically signed by    Jose Houser DO  Signed 29-May-2019 16:19:07      Imaging: I have personally reviewed pertinent reports  XR chest 1 view portable    Result Date: 2023  Narrative: CHEST INDICATION:   sob  COMPARISON: 2023 EXAM PERFORMED/VIEWS:  XR CHEST PORTABLE 1 image FINDINGS: Cardiomediastinal silhouette appears unremarkable  The lungs are clear  No pneumothorax or pleural effusion  Left shoulder arthroplasty partially visualized  Impression: No acute cardiopulmonary disease  Workstation performed: [id number]     CT chest without contrast    Result Date: 2023  Narrative: CT CHEST WITHOUT IV CONTRAST INDICATION:   sob  Shortness of breath  COMPARISON: CT chest abdomen pelvis without contrast 3/26/2023  TECHNIQUE: CT examination of the chest was performed without intravenous contrast  Multiplanar 2D reformatted images were created from the source data  Radiation dose length product (DLP) for this visit:  1344 mGy-cm     This examination, like all CT scans performed in the [facility], was performed utilizing techniques to minimize radiation dose exposure, including the use of iterative reconstruction and automated exposure control  FINDINGS: LUNGS:  There is no tracheal or endobronchial lesion  No focal consolidation or groundglass opacity  Small scattered bilateral pulmonary nodules  3 reference nodules: - 0 5 cm left lower lobe solid pulmonary nodule (3:86), unchanged  - 0 5 cm right lower lobe solid pulmonary nodule (3:113), unchanged  - 0 5 cm right middle lobe solid pulmonary nodule (3:128), unchanged  PLEURA:  Unremarkable  HEART/GREAT VESSELS: Normal heart size  New trace pericardial effusion  Aortic valve and coronary artery calcifications  No thoracic aortic aneurysm  Mild scattered calcified atherosclerotic disease  MEDIASTINUM AND LUIGI:  Unremarkable  CHEST WALL AND LOWER NECK:  Unremarkable  VISUALIZED STRUCTURES IN THE UPPER ABDOMEN:  Unremarkable  OSSEOUS STRUCTURES:  No acute fracture or destructive osseous lesion  Left reverse shoulder total arthroplasty  Diffuse idiopathic skeletal hyperostosis (DISH)  Impression: New trace pericardial effusion  Recommend correlation with echocardiogram  Small scattered bilateral pulmonary nodules measuring up to 0 5 cm  Based on current Fleischner Society 2017 Guidelines on incidental pulmonary nodule, no routine follow-up is needed if the patient is low risk  If the patient is high risk, optional follow-up chest CT at 12 months can be considered  The study was marked in [facility] for immediate notification  Workstation performed: [id number]     Mammo bhavik  needle loc right (all inc) ea add    Result Date: 4/24/2023  Narrative: DIAGNOSIS: Atypical hyperplasia of right breast INDICATION: Grayson Thomas is a 81 y o  female presenting for surgical planning for Bhavik  directed Right lumpectom  Prior to the procedure, previous imaging was reviewed  The procedure was explained to the patient in detail  All questions were answered    Written and verbal informed consent was obtained  FINDINGS: RIGHT A) CALCIFICATIONS: Images of the right breast calcifications in the outer central region at 9 o'clock in the middle portion were obtained  The patient was placed upright on the biopsy table  A clip placement was performed under  mammographic guidance using a lateral approach  After obtaining informed consent for the procedure at which time the risks and benefits were explained to the patient including bleeding, infection, and pneumothorax, a timeout was performed  The skin was prepped in the usual fashion  Anesthesia was administered using 5 mL of lidocaine 1%  A 16 G device was advanced through which the [employer] was deployed  Audible confirmation is made with a hand-held transducer held over the skin following deployment  Utilizing similar technique, the calcifications posterior and slightly cephalad to the initial biopsy site are localized for bracketed [facility] placement  Again, audible confirmation is confirmed with a hand-held transducer held over the skin following deployment  It should be noted because of the patient's breast size, audible confirmation is made for both sites at the undersurface of the breast   Post-placement digital mammographic imaging demonstrates the clips were at the sites  The patient tolerated the procedure well  There were no immediate complications  Impression:  Successful bracketed KRIS  reflector deployment for high risk lesion in the central outer right breast  RECOMMENDATION:      - Surgical consultation for the right breast  Workstation ID: [id number]       EKG/ Monitor: Personally reviewed       Telemetry reviewed: currently sinus rhythm, rate 90 bpm  Converted to SVT at 0057 hrs, converted to sinus tachycardia at 0211 hrs      4/28/23 1800 hrs: sinus rhythm, rate 83 bpm      4/29/23 0152 hrs: SVT, 142 bpm       Code Status: Level 1 - Full Code  Advance Directive and Living Will: KAYLAN:        Khushboo Verduzco PA-C

## 2023-04-29 NOTE — ASSESSMENT & PLAN NOTE
· Suspect multifactorial secondary to COPD, asthma, sleep apnea, restrictive pulmonary disease, obesity hypoventilation syndrome  · At baseline does not wear oxygen during the day  · ABG showed normal pH, PCO2 45 7  · Patient satting well on room air in ED  · Continue CPAP with oxygen at HS as below

## 2023-04-29 NOTE — ASSESSMENT & PLAN NOTE
· On Wixela inhaler daily, albuterol 3 times daily at home    · Substitute Wixela to Breo  · Continue Singulair, Flonase  · Neb treatment as above

## 2023-04-29 NOTE — ASSESSMENT & PLAN NOTE
· On metoprolol, Eliquis at home  We will continue  · EKG sinus rhythm to sinus tach in ED  · Avoid albuterol neb  · Xopenex as above  · Optimize electrolytes  Potassium 3 8, mag 1 6    Replete  · Telemetry for 24 hours

## 2023-04-29 NOTE — ASSESSMENT & PLAN NOTE
Patient presents with worsening SOB, worsening cough from baseline for past 2 days  Reports lots of wheezing  Denies fever, chills  Reports history of COPD and asthma, uses Wixela inhaler once a day and albuterol neb 3 times a day at home  · Patient was hospitalized 1 month ago for acute on chronic respiratory failure secondary to reactive airway disease with acute exacerbation  · Patient satting well on room air in ED  · Eosinophils 8  Suspect asthma exacerbation  · CT chest - No focal consolidation or groundglass opacity  Small scattered bilateral pulmonary nodules measuring up to 0 5 cm  New trace pericardial effusion  · Received IV Solu-Medrol, 1 hour neb, Zithromax  · Will continue IV Solu-Medrol 40 every 8 hours  · Continue Zithromax for 2 more days    · Start Mucinex  · Continue Singulair  · Xopenex/Atrovent 3 times daily, Xopenex as needed  · Sputum Gram stain and culture if able  · Check peak flow  · Consult pulmonary

## 2023-04-30 VITALS
RESPIRATION RATE: 18 BRPM | WEIGHT: 255.73 LBS | OXYGEN SATURATION: 99 % | HEIGHT: 62 IN | SYSTOLIC BLOOD PRESSURE: 142 MMHG | HEART RATE: 66 BPM | TEMPERATURE: 97.7 F | DIASTOLIC BLOOD PRESSURE: 80 MMHG | BODY MASS INDEX: 47.06 KG/M2

## 2023-04-30 LAB
ANION GAP SERPL CALCULATED.3IONS-SCNC: 10 MMOL/L (ref 4–13)
ATRIAL RATE: 111 BPM
ATRIAL RATE: 136 BPM
ATRIAL RATE: 142 BPM
ATRIAL RATE: 83 BPM
BUN SERPL-MCNC: 47 MG/DL (ref 5–25)
CALCIUM SERPL-MCNC: 8.5 MG/DL (ref 8.4–10.2)
CHLORIDE SERPL-SCNC: 98 MMOL/L (ref 96–108)
CO2 SERPL-SCNC: 28 MMOL/L (ref 21–32)
CREAT SERPL-MCNC: 1.82 MG/DL (ref 0.6–1.3)
GFR SERPL CREATININE-BSD FRML MDRD: 27 ML/MIN/1.73SQ M
GLUCOSE SERPL-MCNC: 282 MG/DL (ref 65–140)
GLUCOSE SERPL-MCNC: 292 MG/DL (ref 65–140)
GLUCOSE SERPL-MCNC: 296 MG/DL (ref 65–140)
GLUCOSE SERPL-MCNC: 416 MG/DL (ref 65–140)
P AXIS: 34 DEGREES
P AXIS: 44 DEGREES
POTASSIUM SERPL-SCNC: 4.7 MMOL/L (ref 3.5–5.3)
PR INTERVAL: 168 MS
PR INTERVAL: 172 MS
QRS AXIS: -33 DEGREES
QRS AXIS: -35 DEGREES
QRS AXIS: -36 DEGREES
QRS AXIS: -37 DEGREES
QRSD INTERVAL: 70 MS
QRSD INTERVAL: 70 MS
QRSD INTERVAL: 78 MS
QRSD INTERVAL: 80 MS
QT INTERVAL: 332 MS
QT INTERVAL: 334 MS
QT INTERVAL: 344 MS
QT INTERVAL: 392 MS
QTC INTERVAL: 460 MS
QTC INTERVAL: 467 MS
QTC INTERVAL: 502 MS
QTC INTERVAL: 510 MS
SODIUM SERPL-SCNC: 136 MMOL/L (ref 135–147)
T WAVE AXIS: 15 DEGREES
T WAVE AXIS: 21 DEGREES
T WAVE AXIS: 22 DEGREES
T WAVE AXIS: 32 DEGREES
VENTRICULAR RATE: 111 BPM
VENTRICULAR RATE: 136 BPM
VENTRICULAR RATE: 142 BPM
VENTRICULAR RATE: 83 BPM

## 2023-04-30 RX ORDER — AZITHROMYCIN 250 MG/1
500 TABLET, FILM COATED ORAL ONCE
Status: COMPLETED | OUTPATIENT
Start: 2023-04-30 | End: 2023-04-30

## 2023-04-30 RX ORDER — PREDNISONE 20 MG/1
40 TABLET ORAL DAILY
Qty: 10 TABLET | Refills: 0 | Status: SHIPPED | OUTPATIENT
Start: 2023-05-01 | End: 2023-05-06

## 2023-04-30 RX ORDER — METOPROLOL SUCCINATE 50 MG/1
50 TABLET, EXTENDED RELEASE ORAL 2 TIMES DAILY
Qty: 30 TABLET | Refills: 0 | Status: SHIPPED | OUTPATIENT
Start: 2023-04-30

## 2023-04-30 RX ADMIN — METHYLPREDNISOLONE SODIUM SUCCINATE 40 MG: 40 INJECTION, POWDER, FOR SOLUTION INTRAMUSCULAR; INTRAVENOUS at 08:33

## 2023-04-30 RX ADMIN — LEVALBUTEROL HYDROCHLORIDE 1.25 MG: 1.25 SOLUTION RESPIRATORY (INHALATION) at 07:37

## 2023-04-30 RX ADMIN — IPRATROPIUM BROMIDE 0.5 MG: 0.5 SOLUTION RESPIRATORY (INHALATION) at 07:37

## 2023-04-30 RX ADMIN — METOPROLOL SUCCINATE 50 MG: 50 TABLET, EXTENDED RELEASE ORAL at 08:32

## 2023-04-30 RX ADMIN — INSULIN LISPRO 2 UNITS: 100 INJECTION, SOLUTION INTRAVENOUS; SUBCUTANEOUS at 02:19

## 2023-04-30 RX ADMIN — INSULIN LISPRO 14 UNITS: 100 INJECTION, SOLUTION INTRAVENOUS; SUBCUTANEOUS at 08:34

## 2023-04-30 RX ADMIN — INSULIN LISPRO 14 UNITS: 100 INJECTION, SOLUTION INTRAVENOUS; SUBCUTANEOUS at 11:51

## 2023-04-30 RX ADMIN — NYSTATIN: 100000 POWDER TOPICAL at 08:34

## 2023-04-30 RX ADMIN — LEVALBUTEROL HYDROCHLORIDE 1.25 MG: 1.25 SOLUTION RESPIRATORY (INHALATION) at 13:40

## 2023-04-30 RX ADMIN — AZITHROMYCIN MONOHYDRATE 500 MG: 250 TABLET ORAL at 14:38

## 2023-04-30 RX ADMIN — PREGABALIN 100 MG: 100 CAPSULE ORAL at 08:33

## 2023-04-30 RX ADMIN — TORSEMIDE 40 MG: 20 TABLET ORAL at 08:32

## 2023-04-30 RX ADMIN — FLUTICASONE FUROATE AND VILANTEROL TRIFENATATE 1 PUFF: 100; 25 POWDER RESPIRATORY (INHALATION) at 08:34

## 2023-04-30 RX ADMIN — FLUTICASONE PROPIONATE 1 SPRAY: 50 SPRAY, METERED NASAL at 08:34

## 2023-04-30 RX ADMIN — GUAIFENESIN 1200 MG: 600 TABLET, EXTENDED RELEASE ORAL at 08:32

## 2023-04-30 RX ADMIN — INSULIN LISPRO 6 UNITS: 100 INJECTION, SOLUTION INTRAVENOUS; SUBCUTANEOUS at 11:51

## 2023-04-30 RX ADMIN — INSULIN LISPRO 4 UNITS: 100 INJECTION, SOLUTION INTRAVENOUS; SUBCUTANEOUS at 08:34

## 2023-04-30 RX ADMIN — APIXABAN 5 MG: 5 TABLET, FILM COATED ORAL at 08:33

## 2023-04-30 RX ADMIN — IPRATROPIUM BROMIDE 0.5 MG: 0.5 SOLUTION RESPIRATORY (INHALATION) at 13:40

## 2023-04-30 RX ADMIN — MONTELUKAST 10 MG: 10 TABLET, FILM COATED ORAL at 08:33

## 2023-04-30 RX ADMIN — INSULIN GLARGINE 32 UNITS: 100 INJECTION, SOLUTION SUBCUTANEOUS at 08:33

## 2023-04-30 NOTE — ASSESSMENT & PLAN NOTE
· Had recent repeat sleep study  Just got a new machine and started to use it last night  Was on oxygen 2L with machine at bedtime, was cut down to 1 L with the new machine  · Reviewed pulmonary note  Patient was ordered CPAP at 7 cmH2O with 1 L of oxygen at bedtime     · Resume CPAP upon discharge

## 2023-04-30 NOTE — ASSESSMENT & PLAN NOTE
· On metoprolol, Eliquis at home  We will continue  · EKG sinus rhythm to sinus tach in ED  · Avoid albuterol neb  · Xopenex as above  · Optimize electrolytes  Potassium 3 8, mag 1 6  Repleted  · Electrolytes normalized with replacement

## 2023-04-30 NOTE — ASSESSMENT & PLAN NOTE
Lab Results   Component Value Date    HGBA1C 8 1 (H) 03/25/2023       Recent Labs     04/29/23  2119 04/30/23  0210 04/30/23  0710 04/30/23  1111   POCGLU 330* 296* 292* 416*       Blood Sugar Average: Last 72 hrs:  (P) 346 4   · A1c 8 1 last month  · On Lantus 25 units subcu twice daily, Humalog 10 units twice daily with SSI, Ozempic at home  · Glucose 224  Anticipate hyperglycemia with steroids  · Patient currently on Lantus 28 units twice daily which was increased to 32 units twice daily  · Continue Humalog 14 units with meals  · Continue Humalog sliding scale with Accu-Cheks before every meal and at bedtime    · Uncontrolled blood sugars likely secondary to steroids  · Resume home dose of Lantus, Humalog along with Ozempic

## 2023-04-30 NOTE — PLAN OF CARE
Problem: MOBILITY - ADULT  Goal: Maintains/Returns to pre admission functional level  Description: INTERVENTIONS:  - Perform BMAT or MOVE assessment daily    - Set and communicate daily mobility goal to care team and patient/family/caregiver  - Collaborate with rehabilitation services on mobility goals if consulted  - Perform Range of Motion 4 times a day    - Reposition patient every 2 hours  - Out of bed for meals 3 times a day  - Out of bed for toileting  - Record patient progress and toleration of activity level   Outcome: Progressing     Problem: RESPIRATORY - ADULT  Goal: Achieves optimal ventilation and oxygenation  Description: INTERVENTIONS:  - Assess for changes in respiratory status  - Assess for changes in mentation and behavior  - Position to facilitate oxygenation and minimize respiratory effort  - Oxygen administered by appropriate delivery if ordered  - Initiate smoking cessation education as indicated  - Encourage broncho-pulmonary hygiene including cough, deep breathe, Incentive Spirometry  - Assess the need for suctioning and aspirate as needed  - Assess and instruct to report SOB or any respiratory difficulty  - Respiratory Therapy support as indicated  Outcome: Progressing     Problem: CARDIOVASCULAR - ADULT  Goal: Absence of cardiac dysrhythmias or at baseline rhythm  Description: INTERVENTIONS:  - Continuous cardiac monitoring, vital signs, obtain 12 lead EKG if ordered  - Administer antiarrhythmic and heart rate control medications as ordered  - Monitor electrolytes and administer replacement therapy as ordered  Outcome: Progressing     Problem: PAIN - ADULT  Goal: Verbalizes/displays adequate comfort level or baseline comfort level  Description: Interventions:  - Encourage patient to monitor pain and request assistance  - Assess pain using appropriate pain scale  - Administer analgesics based on type and severity of pain and evaluate response  - Implement non-pharmacological measures as appropriate and evaluate response  - Consider cultural and social influences on pain and pain management  - Notify physician/advanced practitioner if interventions unsuccessful or patient reports new pain  Outcome: Progressing     Problem: INFECTION - ADULT  Goal: Absence or prevention of progression during hospitalization  Description: INTERVENTIONS:  - Assess and monitor for signs and symptoms of infection  - Monitor lab/diagnostic results  - Monitor all insertion sites, i e  indwelling lines  - Administer medications as ordered  - Instruct and encourage patient and family to use good hand hygiene technique    Outcome: Progressing     Problem: SAFETY ADULT  Goal: Patient will remain free of falls  Description: INTERVENTIONS:  - Educate patient/family on patient safety including physical limitations  - Instruct patient to call for assistance with activity   - Consult OT/PT to assist with strengthening/mobility   - Keep Call bell within reach  - Keep bed low and locked with side rails adjusted as appropriate  - Keep care items and personal belongings within reach  - Initiate and maintain comfort rounds  - Make Fall Risk Sign visible to staff  - Offer Toileting every  2 Hours, in advance of need  - Initiate/Maintain bed/chair alarm    - Apply yellow socks and bracelet for high fall risk patients  - Consider moving patient to room near nurses station  Outcome: Progressing

## 2023-04-30 NOTE — PROGRESS NOTES
"Progress Note - Pulmonary   Marii Wong 81 y o  female MRN: [id number]  Unit/Bed#: [address] [place] [address]- Encounter: [id number]    Assessment:  1  Acute pulmonary sufficiency  2  Moderate persistent asthma with acute exacerbation  Patient does not have COPD  No emphysema on CT scan  She quit smoking 1996, and 2 spirometries over the past few years showed no fixed obstruction  3  Suspect viral acute bronchitis triggering the asthma exacerbation  4  Restrictive airway disease on spirometry most likely secondary to morbid obesity, no evidence of interstitial lung disease on CT scan  5  Allergic rhinitis with postnasal drip  6  Morbid obesity with KATRINA and nocturnal hypoxia on CPAP 7 cm H2O at night with oxygen  7  Paroxysmal atrial fibrillation on Eliquis  8  Probably chronic diastolic CHF on torsemide, appears compensated       Plan:  · I believe patient can be weaned off oxygen completely and continue her nocturnal oxygen with CPAP at home and as needed  · Transition to prednisone 40 mg daily for 5 days  · Continue bronchodilators nebulizer therapy and on discharge resume Wixela 250/50 with as needed albuterol  · Continue Flonase and Singulair  · Encourage out of bed and ambulation  · Continue nocturnal CPAP with oxygen  · Complete 3 days of azithromycin p o  500 mg daily  · I believe patient can be discharged home, will sign off, follow-up as outpatient    ----------------------------------------------------------------------------------------------------------------------    HPI/Interval History:   Feels much better, no major events overnight  Her shortness of breath and cough improved significantly    Vitals:   Blood pressure 142/80, pulse 66, temperature 97 7 °F (36 5 °C), resp  rate 18, height 5' 2\" (1 575 m), weight 116 kg (255 lb 11 7 oz), SpO2 100 %, not currently breastfeeding  ,Body mass index is 46 77 kg/m²    SpO2: 100 %  SpO2 Activity: At Rest  O2 Device: Nasal cannula    Physical Exam:   Gen: Alert and " "without respiratory distress  HEENT: PERRL  O/P: clear  no erythema or exudate  Neck: Supple  There is no JVD, no LAD or thyromegaly appreciated  Trachea is midline  Chest: Equal breath sounds and clear to auscultation bilaterally  Cardiac: S1-S2 regular, no murmurs  Abdomen: Soft and nontender  Bowel sounds are present  Extremities: No edema  Neuro:  Awake alert and oriented, no focal deficits    Labs:    CBC:  Results from last 7 days   Lab Units 04/28/23  1746   WBC Thousand/uL 9 70   HEMOGLOBIN g/dL 11 6   HEMATOCRIT % 35 5   PLATELETS Thousands/uL 226       CMP:   Results from last 7 days   Lab Units 04/30/23  0531 04/29/23  0702 04/28/23  1746   POTASSIUM mmol/L 4 7 4 8 3 8   CHLORIDE mmol/L 98 101 98   CO2 mmol/L 28 30 32   BUN mg/dL 47* 37* 39*   CREATININE mg/dL 1 82* 1 67* 1 79*   CALCIUM mg/dL 8 5 8 1* 8 8   ALK PHOS U/L  --   --  57   ALT U/L  --   --  15   AST U/L  --   --  18         Mary Alice Fraga MD    Portions of the record may have been created with voice recognition software  Occasional wrong word or \"sound a like\" substitutions may have occurred due to the inherent limitations of voice recognition software  Read the chart carefully and recognize, using context, where substitutions have occurred      "

## 2023-04-30 NOTE — PROGRESS NOTES
Progress Note - Cardiology   [facility]     Nora Mcfadden 81 y o  female MRN: [id number]  : 1950  Unit/Bed#: [address] [address] Encounter: [id number]    Assessment and Plan:   1  PSVT     - Patient with documented history of PSVT  Episodes of PSVT during this hospitalization likely secondary to acute asthma exacerbation and hypomagnesium (1 6 on presentation)  - 23 EKG 1800 hrs: sinus rhythm, rate 83 bpm  Repeat EKG 23 0152 hrs: SVT, 142 bpm    - - Telemetry reviewed: currently sinus rhythm, rate 90 bpm  Converted to SVT at 0057 hrs, converted to sinus tachycardia at 0211 hrs  Given cardizem 15 mg at 0233 hrs  Patient reports experiencing palpitations overnight which she states has resolved  - - Telemetry reviewed: currently sinus rhythm, rate 74 bpm  Note short episode of atrial tachycardia at 1802 hrs on 23    - Continue Toprol XL 50 mg twice daily    - Monitor electrolytes  Replete potassium above 4 and magnesium above 2       2  Pericardial effusion     - 23 CT chest w/o contrast: New trace pericardial effusion   - Patient currently hemodynamically stable  - TTE 23: There is a trivial pericardial effusion   - No intervention indicated at this time       3  Moderate persistent asthma with acute exacerbation       - Presented for evaluation for cough for several days and shortness of breath for two days  - 23 CXR: The lungs are clear  No pneumothorax or pleural effusion     - Care per primary team       4  Chronic respiratory failure with hypoxia and hypercapnia      - Likely multifactorial due to COPD, asthma, obesity hyperventilation syndrome, and restrictive lung disease    - Patient reports she does not use home O2       5  Chronic diastolic heart failure       - Does not appear in acute phase    - Patient reports history of chronic B/L lower extremity edema    - Follows outpatient with HF specialist Dr Beckie Jeter     - "4/28/23 BNP: 24   - 4/28/23 CXR: The lungs are clear   No pneumothorax or pleural effusion     - TTE 3/12/95: Systolic function is normal  Wall motion is normal  Diastolic function is normal for age  - 3/27/23 TTE: LVEF 50-55%  Diastolic function is normal  study date: 3/10/2022  No significant changes noted compared to the prior study  - Continue Toprol XL 50 mg twice daily  - Continue torsemide 40 mg daily    - Monitor I/0s    - Daily weights    - CHF education    - Low sodium diet       6  Paroxysmal atrial fibrillation       - Patient with document history of paroxysmal atrial fibrillation    - [id number] stroke risk score: 5 points, moderate-high risk  - Continue eliquis 5 mg twice daily  - 5/19/22 Holter: Average heart rate 75 beats  No major SVT/afib/flutter  - Continue Toprol XL 50 mg twice daily       7  Obstructive sleep apnea       - Patient reports she is compliant with home CPAP  - Inpatient CPAP ordered       8  Obesity hyperventilation syndrome       - Patient reports she does not use home O2    - BMI: 46 77 kg/m2       9  Hypertension       - BP controlled  - Continue Toprol XL 50 mg twice daily       10  Mixed hyperlipidemia       - 12/20/22 lipid panel: cholesterol 155, triglycerides 259, HDL 42, LDL 61    - Continue pravastatin 80 mg daily       11  DMII       - 3/25/23 HgbA1C: 8 1    - Care per primary team       12  CKDIII     - Baseline creatinine 1 5-1 7    - Care per primary team      Subjective / Objective:         Telemetry reviewed note short episode of atrial tachycardia at 1802hrs on 4/29/23  Patient states she felt palpitations at that time  She reports her breathing has significantly improved since admission  She currently denies shortness of breath, chest pain, palpitations, lightheadedness, dizziness, nausea or vomiting  Vitals: Blood pressure 142/80, pulse 66, temperature 97 7 °F (36 5 °C), resp   rate 18, height 5' 2\" (1 575 m), weight 116 kg (255 lb 11 7 " oz), SpO2 100 %, not currently breastfeeding  Vitals:    04/28/23 2144 04/29/23 0910   Weight: 116 kg (256 lb 6 4 oz) 116 kg (255 lb 11 7 oz)     Body mass index is 46 77 kg/m²  BP Readings from Last 3 Encounters:   04/30/23 142/80   04/28/23 120/70   04/24/23 124/76     Orthostatic Blood Pressures    Flowsheet Row Most Recent Value   Blood Pressure 142/80 filed at 04/30/2023 0745   Patient Position - Orthostatic VS Lying filed at 04/30/2023 0224        I/O       04/28 0701  04/29 0700 04/29 0701  04/30 0700 04/30 0701  05/01 0700    P  O   780 220    Total Intake(mL/kg)  780 (6 7) 220 (1 9)    Urine (mL/kg/hr) 450 1300 (0 5)     Total Output 450 1300     Net -450 -520 +220           Unmeasured Urine Occurrence  1 x         Invasive Devices     Peripheral Intravenous Line  Duration           Peripheral IV 04/28/23 Right Antecubital 1 day                  Intake/Output Summary (Last 24 hours) at 4/30/2023 1057  Last data filed at 4/30/2023 0835  Gross per 24 hour   Intake 880 ml   Output 1300 ml   Net -420 ml         Physical Exam:   Physical Exam  Vitals reviewed  Constitutional:       General: She is not in acute distress  Appearance: She is obese  Cardiovascular:      Rate and Rhythm: Normal rate and regular rhythm  Pulses: Normal pulses  Heart sounds: Murmur heard  Pulmonary:      Effort: Pulmonary effort is normal  No respiratory distress  Breath sounds: Decreased breath sounds present  Abdominal:      General: Abdomen is flat  There is no distension  Palpations: Abdomen is soft  Tenderness: There is no abdominal tenderness  Musculoskeletal:      Right lower leg: Edema present  Left lower leg: Edema present  Comments: Patient reports chronic B/L lower extremity edema  Skin:     General: Skin is warm and dry  Neurological:      Mental Status: She is alert and oriented to person, place, and time         Medications/ Allergies:     Current Facility-Administered Medications   Medication Dose Route Frequency Provider Last Rate    acetaminophen  650 mg Oral Q6H PRN Osiris Palencia, CRNP      apixaban  5 mg Oral BID Osiris Palencia, RK      azithromycin  500 mg Oral Q24H Osiris Palencia, RK      calcium carbonate  1,000 mg Oral BID PRN Osiris Palencia, CRDALILA      famotidine  20 mg Oral HS Osiris Palencia, CRDALILA      fluticasone  1 spray Nasal Daily Osiris Palencia, CRDALILA      Fluticasone Furoate-Vilanterol  1 puff Inhalation Daily Osiris Palencia, RK      guaiFENesin  1,200 mg Oral BID Osiris Palencia, RK      insulin glargine  32 Units Subcutaneous Q12H Vicente [id number], MD      insulin lispro  1-5 Units Subcutaneous HS Osiris Palencia, RK      insulin lispro  1-5 Units Subcutaneous 0200 Osiris Palencia, CRDALILA      insulin lispro  1-6 Units Subcutaneous TID AC Osiris Palencia, CRDALILA      insulin lispro  14 Units Subcutaneous TID With Meals Osiris Palencia, RK      ipratropium  0 5 mg Nebulization TID Osiris Palencia, RK      levalbuterol  0 63 mg Nebulization Q6H PRN Osiris Palencia, RK      levalbuterol  1 25 mg Nebulization TID Osiris Palencia, RK      methylPREDNISolone sodium succinate  40 mg Intravenous Q12H [address] [place] Palencia, CRDALILA      metoprolol succinate  50 mg Oral BID Cricket Santana PA-C      montelukast  10 mg Oral Daily [place] Palencia, RK      nystatin   Topical BID Jen Chris MD      pravastatin  80 mg Oral Daily With dK Darden, RK      pregabalin  100 mg Oral BID Osiris Palencia, RK      torsemide  40 mg Oral Daily Osiris Palencia, RK       acetaminophen, 650 mg, Q6H PRN  calcium carbonate, 1,000 mg, BID PRN  levalbuterol, 0 63 mg, Q6H PRN      Allergies   Allergen Reactions    Penicillins Hives    Moxifloxacin Other (See Comments)     unknown    Oxycodone-Acetaminophen Other (See Comments)     GI upset    Zinc Acetate Other (See Comments)     unknown    Penicillins Hives    Asa [Aspirin] GI Intolerance    Indocin [Indomethacin] Other (See "Comments)     Made patient \"loopy\"    Other Other (See Comments)     unknown    Tannic Acid Rash       VTE Pharmacologic Prophylaxis:   Eliquis    Labs:   Troponins:  Results from last 7 days   Lab Units 04/28/23 1956   HSTNI D2 ng/L -2         CBC with diff:  Results from last 7 days   Lab Units 04/28/23  1746   WBC Thousand/uL 9 70   HEMOGLOBIN g/dL 11 6   HEMATOCRIT % 35 5   MCV fL 92   PLATELETS Thousands/uL 226   MCH pg 30 0   MCHC g/dL 32 7   RDW % 14 3   MPV fL 10 6   NRBC AUTO /100 WBCs 0       CMP:  Results from last 7 days   Lab Units 04/30/23  0531 04/29/23  0702 04/28/23  1746   SODIUM mmol/L 136 139 141   POTASSIUM mmol/L 4 7 4 8 3 8   CHLORIDE mmol/L 98 101 98   CO2 mmol/L 28 30 32   ANION GAP mmol/L 10 8 11   BUN mg/dL 47* 37* 39*   CREATININE mg/dL 1 82* 1 67* 1 79*   GLUCOSE FASTING mg/dL  --  288*  --    CALCIUM mg/dL 8 5 8 1* 8 8   AST U/L  --   --  18   ALT U/L  --   --  15   ALK PHOS U/L  --   --  57   TOTAL PROTEIN g/dL  --   --  7 0   ALBUMIN g/dL  --   --  3 8   TOTAL BILIRUBIN mg/dL  --   --  0 30   EGFR ml/min/1 73sq m 27 30 28       Magnesium:  Results from last 7 days   Lab Units 04/29/23  0702 04/28/23  1746   MAGNESIUM mg/dL 2 3 1 6*     Imaging & Testing   I have personally reviewed pertinent reports  XR chest 1 view portable    Result Date: 4/29/2023  Narrative: CHEST INDICATION:   sob  COMPARISON: 03/25/2023 EXAM PERFORMED/VIEWS:  XR CHEST PORTABLE 1 image FINDINGS: Cardiomediastinal silhouette appears unremarkable  The lungs are clear  No pneumothorax or pleural effusion  Left shoulder arthroplasty partially visualized  Impression: No acute cardiopulmonary disease  Workstation performed: [id number]     CT chest without contrast    Result Date: 4/28/2023  Narrative: CT CHEST WITHOUT IV CONTRAST INDICATION:   sob  Shortness of breath  COMPARISON: CT chest abdomen pelvis without contrast 3/26/2023   TECHNIQUE: CT examination of the chest was performed without intravenous " contrast  Multiplanar 2D reformatted images were created from the source data  Radiation dose length product (DLP) for this visit:  1344 mGy-cm   This examination, like all CT scans performed in the [facility], was performed utilizing techniques to minimize radiation dose exposure, including the use of iterative reconstruction and automated exposure control  FINDINGS: LUNGS:  There is no tracheal or endobronchial lesion  No focal consolidation or groundglass opacity  Small scattered bilateral pulmonary nodules  3 reference nodules: - 0 5 cm left lower lobe solid pulmonary nodule (3:86), unchanged  - 0 5 cm right lower lobe solid pulmonary nodule (3:113), unchanged  - 0 5 cm right middle lobe solid pulmonary nodule (3:128), unchanged  PLEURA:  Unremarkable  HEART/GREAT VESSELS: Normal heart size  New trace pericardial effusion  Aortic valve and coronary artery calcifications  No thoracic aortic aneurysm  Mild scattered calcified atherosclerotic disease  MEDIASTINUM AND LUIGI:  Unremarkable  CHEST WALL AND LOWER NECK:  Unremarkable  VISUALIZED STRUCTURES IN THE UPPER ABDOMEN:  Unremarkable  OSSEOUS STRUCTURES:  No acute fracture or destructive osseous lesion  Left reverse shoulder total arthroplasty  Diffuse idiopathic skeletal hyperostosis (DISH)  Impression: New trace pericardial effusion  Recommend correlation with echocardiogram  Small scattered bilateral pulmonary nodules measuring up to 0 5 cm  Based on current Fleischner Society 2017 Guidelines on incidental pulmonary nodule, no routine follow-up is needed if the patient is low risk  If the patient is high risk, optional follow-up chest CT at 12 months can be considered  The study was marked in [facility] for immediate notification  Workstation performed: [id number]     Mammo bhavik  needle loc right (all inc) ea add    Result Date: 4/24/2023  Narrative: DIAGNOSIS: Atypical hyperplasia of right breast INDICATION: Vahe Alcantara is a 81 y o  female presenting for surgical planning for [facility]  directed Right lumpectom  Prior to the procedure, previous imaging was reviewed  The procedure was explained to the patient in detail  All questions were answered  Written and verbal informed consent was obtained  FINDINGS: RIGHT A) CALCIFICATIONS: Images of the right breast calcifications in the outer central region at 9 o'clock in the middle portion were obtained  The patient was placed upright on the biopsy table  A clip placement was performed under  mammographic guidance using a lateral approach  After obtaining informed consent for the procedure at which time the risks and benefits were explained to the patient including bleeding, infection, and pneumothorax, a timeout was performed  The skin was prepped in the usual fashion  Anesthesia was administered using 5 mL of lidocaine 1%  A 16 G device was advanced through which the [employer] was deployed  Audible confirmation is made with a hand-held transducer held over the skin following deployment  Utilizing similar technique, the calcifications posterior and slightly cephalad to the initial biopsy site are localized for [facility] placement  Again, audible confirmation is confirmed with a hand-held transducer held over the skin following deployment  It should be noted because of the patient's breast size, audible confirmation is made for both sites at the undersurface of the breast   Post-placement digital mammographic imaging demonstrates the clips were at the sites  The patient tolerated the procedure well  There were no immediate complications  Impression:  Successful bracketed KRIS  reflector deployment for high risk lesion in the central outer right breast  RECOMMENDATION:      - Surgical consultation for the right breast  Workstation ID: [id number]        EKG / Monitor: Personally reviewed        Telemetry reviewed: currently sinus rhythm, rate 74 bpm  Note short episode of atrial tachycardia at 1802hrs on 23  Cardiac testing:     Echo follow up/limited w/ contrast if indicated    Result Date: 2023  Narrative: Alexandro Boeck  Left Ventricle: Left ventricular cavity size is normal  Wall thickness is normal  Systolic function is normal  Wall motion is normal  Diastolic function is normal for age    Mitral Valve: There is trace regurgitation    Pericardium: There is a trivial pericardial effusion    As compared to previous study of 2023, no significant change  Results for orders placed during the hospital encounter of 19    Echo complete with contrast if indicated    Narrative  [address]  [address] [facility] [address]  [phone]    Transthoracic Echocardiogram  2D, M-mode, Doppler, and Color Doppler    Study date:  29-May-2019    Patient: Cortney Westbrook  MR number: [id number]  Account number: [de-identified]  : 1950  Age: 79 years  Gender: Female  Status: Inpatient  Location: Bedside  Height: 62 in  Weight: 250 6 lb  BP: 133/ 62 mmHg    Indications: Tachycardia    Diagnoses: I11 9 - Hypertensive heart disease without heart failure    Sonographer:  QUETA Reed  Referring Physician:  Rose Brewer MD  Group:  [facility]  Interpreting Physician:  DO GRUPO Hussein    LEFT VENTRICLE:  Systolic function was normal by visual assessment  Ejection fraction was estimated to be 55 %  There were no regional wall motion abnormalities  Wall thickness was mildly to moderately increased  Doppler parameters were consistent with abnormal left ventricular relaxation (grade 1 diastolic dysfunction)  MITRAL VALVE:  There was mild regurgitation  AORTIC VALVE:  There was no evidence for stenosis  There was no regurgitation  TRICUSPID VALVE:  There was mild regurgitation  Pulmonary artery systolic pressure was within the normal range      HISTORY: PRIOR HISTORY: Diabetes,Diabetes, HTN, Hyperlipidemia, A Fib  PROCEDURE: The procedure was performed at the bedside  This was a routine study  The transthoracic approach was used  The study included complete 2D imaging, M-mode, complete spectral Doppler, and color Doppler  The heart rate was 77 bpm,  at the start of the study  Images were obtained from the parasternal, apical, subcostal, and suprasternal notch acoustic windows  Echocardiographic views were limited due to restricted patient mobility, poor patient compliance, poor  acoustic window availability, decreased penetration, and lung interference  This was a technically difficult study  LEFT VENTRICLE: Size was normal  Systolic function was normal by visual assessment  Ejection fraction was estimated to be 55 %  There were no regional wall motion abnormalities  Wall thickness was mildly to moderately increased  DOPPLER:  Doppler parameters were consistent with abnormal left ventricular relaxation (grade 1 diastolic dysfunction)  RIGHT VENTRICLE: The size was normal  Systolic function was normal     LEFT ATRIUM: The atrium was mildly dilated  RIGHT ATRIUM: Size was normal     MITRAL VALVE: Valve structure was normal  There was normal leaflet separation  No echocardiographic evidence for prolapse  DOPPLER: The transmitral velocity was within the normal range  There was no evidence for stenosis  There was mild  regurgitation  AORTIC VALVE: The valve was trileaflet  Leaflets exhibited normal thickness, normal cuspal separation, and sclerosis  DOPPLER: Transaortic velocity was within the normal range  There was no evidence for stenosis  There was no  regurgitation  TRICUSPID VALVE: The valve structure was normal  There was normal leaflet separation  DOPPLER: The transtricuspid velocity was within the normal range  There was mild regurgitation  Pulmonary artery systolic pressure was within the normal  range  Estimated peak PA pressure was 32 mmHg      PULMONIC VALVE: Leaflets exhibited normal thickness, no calcification, and normal cuspal separation  DOPPLER: The transpulmonic velocity was within the normal range  There was no regurgitation  PERICARDIUM: There was no thickening  There was no pericardial effusion  AORTA: The root exhibited normal size  PULMONARY ARTERY: The size was normal  The morphology appeared normal     SYSTEM MEASUREMENT TABLES    2D mode  AoR Diam 2D: 3 6 cm  LA Diam (2D): 3 9 cm  LA/Ao (2D): 1 08  FS (2D Teich): 26 9 %  IVSd (2D): 1 29 cm  LVDEV: 75 1 cmï¾³  LVESV: 35 3 cmï¾³  LVIDd(2D): 4 12 cm  LVISd (2D): 3 01 cm  LVOT Area 2D: 3 14 cmï¾²  LVPWd (2D): 1 2 cm  SV (Teich): 39 8 cmï¾³    Apical four chamber  LVEF A4C: 51 %    Unspecified Scan Mode  HANNA Cont Eq (Peak Gutierrez): 2 58 cmï¾²  LVOT Diam : 2 cm  LVOT Vmax: 963 mm/s  LVOT Vmax; Mean: 963 mm/s  Peak Grad ; Mean: 4 mm[Hg]  MV Peak A Gutierrez: 893 mm/s  MV Peak E Gutierrez  Mean: 805 mm/s  MVA (PHT): 3 67 cmï¾²  PHT: 60 ms  Max P mm[Hg]  V Max: 2360 mm/s  Vmax: 2430 mm/s  RA Area: 12 8 cmï¾²  RA Volume: 27 6 cmï¾³  TAPSE: 2 cm    [facility]    Prepared and electronically signed by    Natali Fuentes DO  Signed 29-May-2019 16:19:07      Results for orders placed during the hospital encounter of 22    NM myocardial perfusion spect (rx stress and/or rest)    Interpretation Summary    Stress ECG: No ST deviation is noted  The ECG was not diagnostic due to pharmacological (vasodilator) stress    Perfusion: There is a left ventricular perfusion defect that is small in size with mild reduction in uptake present in the anterior and apex location(s) that is fixed  The defect appears to be an artifact caused by breast attenuation    Stress Function: Left ventricular function post-stress is normal  Post-stress ejection fraction is 70 %  No ischemia seen  Breast attenuation and left arm in field of image    Pt unable to raise arm due to recent shoulder surgery              Alina Pereira PA-C

## 2023-04-30 NOTE — ASSESSMENT & PLAN NOTE
· Suspect multifactorial secondary to asthma, sleep apnea, restrictive pulmonary disease, obesity hypoventilation syndrome  · At baseline does not wear oxygen during the day  · ABG showed normal pH, PCO2 45 7  · Patient has been stable on room air  Patient ambulated in the hallways without feeling short of breath  · Continue CPAP with oxygen at HS as below

## 2023-04-30 NOTE — ASSESSMENT & PLAN NOTE
Patient presents with worsening SOB, worsening cough from baseline for past 2 days  Reports lots of wheezing  Denies fever, chills  Reports history of COPD and asthma, uses Wixela inhaler once a day and albuterol neb 3 times a day at home  · Patient was hospitalized 1 month ago for acute on chronic respiratory failure secondary to reactive airway disease with acute exacerbation  · Patient satting well on room air in ED  · Eosinophils 8  Suspect asthma exacerbation  · CT chest - No focal consolidation or groundglass opacity  Small scattered bilateral pulmonary nodules measuring up to 0 5 cm  New trace pericardial effusion  · Received IV Solu-Medrol, 1 hour neb, Zithromax  · Patient has been on Solu-Medrol 40 mg IV every 8 hours for discharge and prednisone 40 mg p o  daily  · Patient completed 3 days of azithromycin  · Continue Mucinex and Singulair    Resume Wixela upon discharge along with albuterol  · Patient was on nebulizers during hospitalization  · Pulmonary input appreciated

## 2023-04-30 NOTE — ASSESSMENT & PLAN NOTE
· Baseline creatinine appears around 1 5-1 7s  · Creatinine continues to remain stable close to baseline  Results from last 7 days   Lab Units 04/30/23  0531 04/29/23  0702 04/28/23  1746   BUN mg/dL 47* 37* 39*   CREATININE mg/dL 1 82* 1 67* 1 79*

## 2023-04-30 NOTE — DISCHARGE SUMMARY
[address]  Discharge- Vahe Alcantara 1950, 81 y o  female MRN: [id number]  Unit/Bed#: [address] [place] [address] Encounter: [id number]  Primary Care Provider: Ryan Anguiano MD   Date and time admitted to hospital: 4/28/2023  5:22 PM    * Moderate persistent asthma with acute exacerbation  Assessment & Plan  Patient presents with worsening SOB, worsening cough from baseline for past 2 days  Reports lots of wheezing  Denies fever, chills  Reports history of COPD and asthma, uses Wixela inhaler once a day and albuterol neb 3 times a day at home  · Patient was hospitalized 1 month ago for acute on chronic respiratory failure secondary to reactive airway disease with acute exacerbation  · Patient satting well on room air in ED  · Eosinophils 8  Suspect asthma exacerbation  · CT chest - No focal consolidation or groundglass opacity  Small scattered bilateral pulmonary nodules measuring up to 0 5 cm  New trace pericardial effusion  · Received IV Solu-Medrol, 1 hour neb, Zithromax  · Patient has been on Solu-Medrol 40 mg IV every 8 hours for discharge and prednisone 40 mg p o  daily  · Patient completed 3 days of azithromycin  · Continue Mucinex and Singulair  Resume Wixela upon discharge along with albuterol  · Patient was on nebulizers during hospitalization  · Pulmonary input appreciated    PSVT (paroxysmal supraventricular tachycardia) (HCC)  Assessment & Plan  · Heart rate was up to 150s in ED on exam   EKG showed sinus tach, rate 111  · Heart rate up to 130s on the floor  EKGs showed SVT, rate 130-140s    · Received Cardizem 15 mg IV x1 dose  · Patient is on Toprol-XL 50 mg p o  daily at home  · Blood pressures were repleted and magnesium and potassium levels are normal this morning  · Cardiology input appreciated-Toprol-XL increased to 50 mg p o  twice daily    Chronic diastolic heart failure (HCC)  Assessment & Plan  Wt Readings from Last 3 Encounters:   04/29/23 116 kg (255 lb 11 7 oz)   04/28/23 117 kg (259 lb)   04/18/23 115 kg (253 lb)   · On torsemide 40 mg p o  daily at home  · 2D echo last month showed EF 50 to 01%, diastolic function is normal   PA pressure 39  No pericardial effusion  · Chronic bilateral leg edema per patient  · BNP 24  · CT showed - New trace pericardial effusion  · Limited 2D echo showed trivial pericardial effusion with normal systolic function  · Continue torsemide   · Lower extremity venous Dopplers were all negative but was canceled as patient also has DVT in the setting of anticoagulation          Paroxysmal atrial fibrillation ([facility])  Assessment & Plan  · On metoprolol, Eliquis at home  We will continue  · EKG sinus rhythm to sinus tach in ED  · Avoid albuterol neb  · Xopenex as above  · Optimize electrolytes  Potassium 3 8, mag 1 6  Repleted  · Electrolytes normalized with replacement  Morbid obesity with BMI of 45 0-49 9, adult ([facility])  Assessment & Plan  · Body mass index is 46 77 kg/m²  · Diet and lifestyle modification    Obstructive sleep apnea  Assessment & Plan  · Had recent repeat sleep study  Just got a new machine and started to use it last night  Was on oxygen 2L with machine at bedtime, was cut down to 1 L with the new machine  · Reviewed pulmonary note  Patient was ordered CPAP at 7 cmH2O with 1 L of oxygen at bedtime     · Resume CPAP upon discharge    CKD (chronic kidney disease)  Assessment & Plan  · Baseline creatinine appears around 1 5-1 7s  · Creatinine continues to remain stable close to baseline  Results from last 7 days   Lab Units 04/30/23  0531 04/29/23  0702 04/28/23  1746   BUN mg/dL 47* 37* 39*   CREATININE mg/dL 1 82* 1 67* 1 79*       Type 2 diabetes mellitus with hyperglycemia, without long-term current use of insulin [facility])  Assessment & Plan  Lab Results   Component Value Date    HGBA1C 8 1 (H) 03/25/2023       Recent Labs     04/29/23  2119 04/30/23  0210 04/30/23  0710 04/30/23  1111   POCGLU 330* 296* 292* 416*       Blood Sugar Average: Last 72 hrs:  (P) 346 4   · A1c 8 1 last month  · On Lantus 25 units subcu twice daily, Humalog 10 units twice daily with SSI, Ozempic at home  · Glucose 224  Anticipate hyperglycemia with steroids  · Patient currently on Lantus 28 units twice daily which was increased to 32 units twice daily  · Continue Humalog 14 units with meals  · Continue Humalog sliding scale with Accu-Cheks before every meal and at bedtime  · Uncontrolled blood sugars likely secondary to steroids  · Resume home dose of Lantus, Humalog along with Ozempic    Chronic respiratory failure with hypoxia and hypercapnia (HCC)  Assessment & Plan  · Suspect multifactorial secondary to asthma, sleep apnea, restrictive pulmonary disease, obesity hypoventilation syndrome  · At baseline does not wear oxygen during the day  · ABG showed normal pH, PCO2 45 7  · Patient has been stable on room air  Patient ambulated in the hallways without feeling short of breath  · Continue CPAP with oxygen at HS as below  Mass overlapping multiple quadrants of right breast  Assessment & Plan  · Patient is scheduled for lumpectomy on May 2  Lung nodule  Assessment & Plan  · Multiple bilateral pulmonary nodules on CT  Known on prior CT  · Follow-up PCP/pulmonary as outpatient    Gastroesophageal reflux disease  Assessment & Plan  · Not on medication at home  · Patient is on Pepcid and Tums as needed    Essential hypertension  Assessment & Plan  · On metoprolol, Demadex at home  Continue home medications    · BP elevated on ED arrival   · Blood pressure has been stable    Mixed hyperlipidemia  Assessment & Plan  · Continue statin        Medical Problems     Resolved Problems  Date Reviewed: 4/30/2023   None       Discharging Physician / Practitioner: Deep Barnes MD  PCP: Chastity Lilly MD  Admission Date:   Admission Orders (From admission, onward)     Ordered        04/29/23 1958  Inpatient Admission  Once "      04/28/23 2041  Place in Observation  Once                      Discharge Date: 04/30/23    Consultations During Hospital Stay:  · Pulmonology    Procedures Performed:   ·     Significant Findings / Test Results:   · Echo showed normal systolic function with trivial Pericardial effusion     Outpatient Tests Requested:  · Follow-up with PCP/pulmonology    Complications: None    Reason for Admission: Shortness of breath/cough    Hospital Course:   Mya Zacarias is a 81 y o  female patientwwith past medical history of asthma, obesity hypoventilation, CHF, hypertension, obesity, CKD, hypertension, diabetes ho originally presented to the hospital on 4/28/2023 due to worsening shortness of breath and cough from baseline for the past 2 days  Patient admitted to hospital for asthma exacerbation and was treated with IV steroids and azithromycin  Patient also had some SVT episodes and was seen by cardiology and metoprolol dose was increased  Patient was seen by pulmonology  Patient to be discharged on prednisone taper with an outpatient follow-up with pulmonology/cardiology  's    Please see above list of diagnoses and related plan for additional information  Condition at Discharge: stable    Discharge Day Visit / Exam:   Subjective: Patient is feeling much better  Patient ambulated in the room and also in the home over the feeling significant shortness of breath  Vitals: Blood Pressure: 142/80 (04/30/23 0745)  Pulse: 66 (04/30/23 0745)  Temperature: 97 7 °F (36 5 °C) (04/30/23 0745)  Temp Source: Axillary (04/30/23 0224)  Respirations: 18 (04/30/23 0745)  Height: 5' 2\" (157 5 cm) (04/29/23 0910)  Weight - Scale: 116 kg (255 lb 11 7 oz) (04/29/23 0910)  SpO2: 99 % (04/30/23 1341)  Exam:   Physical Exam  Constitutional:       Appearance: Normal appearance  HENT:      Head: Normocephalic and atraumatic        Nose: Nose normal       Mouth/Throat:      Mouth: Mucous membranes are moist       Pharynx: Oropharynx is " clear    Eyes:      Extraocular Movements: Extraocular movements intact  Pupils: Pupils are equal, round, and reactive to light  Cardiovascular:      Rate and Rhythm: Normal rate and regular rhythm  Pulmonary:      Effort: Pulmonary effort is normal       Breath sounds: Normal breath sounds  Abdominal:      General: Bowel sounds are normal  There is no distension  Palpations: Abdomen is soft  Tenderness: There is no abdominal tenderness  Musculoskeletal:         General: No swelling  Cervical back: Normal range of motion and neck supple  Right lower leg: Edema present  Left lower leg: Edema present  Comments: Bilateral +1 pedal edema   Skin:     General: Skin is warm and dry  Neurological:      General: No focal deficit present  Mental Status: She is alert  Discharge instructions/Information to patient and family:   See after visit summary for information provided to patient and family  Provisions for Follow-Up Care:  See after visit summary for information related to follow-up care and any pertinent home health orders  Disposition:   Home    Planned Readmission: No      Discharge Statement:  I spent 35 minutes discharging the patient  This time was spent on the day of discharge  I had direct contact with the patient on the day of discharge  Greater than 50% of the total time was spent examining patient, answering all patient questions, arranging and discussing plan of care with patient as well as directly providing post-discharge instructions  Additional time then spent on discharge activities  Discharge Medications:  See after visit summary for reconciled discharge medications provided to patient and/or family        **Please Note: This note may have been constructed using a voice recognition system**

## 2023-04-30 NOTE — ASSESSMENT & PLAN NOTE
Wt Readings from Last 3 Encounters:   04/29/23 116 kg (255 lb 11 7 oz)   04/28/23 117 kg (259 lb)   04/18/23 115 kg (253 lb)   · On torsemide 40 mg p o  daily at home  · 2D echo last month showed EF 50 to 43%, diastolic function is normal   PA pressure 39  No pericardial effusion    · Chronic bilateral leg edema per patient  · BNP 24  · CT showed - New trace pericardial effusion  · Limited 2D echo showed trivial pericardial effusion with normal systolic function  · Continue torsemide   · Lower extremity venous Dopplers were all negative but was canceled as patient also has DVT in the setting of anticoagulation

## 2023-05-01 ENCOUNTER — PATIENT OUTREACH (OUTPATIENT)
Dept: INTERNAL MEDICINE CLINIC | Facility: CLINIC | Age: 73
End: 2023-05-01

## 2023-05-01 ENCOUNTER — TRANSITIONAL CARE MANAGEMENT (OUTPATIENT)
Dept: INTERNAL MEDICINE CLINIC | Facility: CLINIC | Age: 73
End: 2023-05-01

## 2023-05-01 NOTE — PROGRESS NOTES
"Pt called for follow up post discharge from hospitalization 4-28-4/30 for asthma exacerbation  Pt states she is doing better, still with cough but no wheezing this morning  Pt states she was able to use bipap at night from 10pm-6am and will use nebulizer after her breakfast  Denies any SOB, is using inhalers as prescribed and prn  Confirmed discharge medication changes with pt  States she has been taking metoprolol 50 mg twice daily  Denies any symptoms of increased heart rate or palpitations  Will call for follow up with cardiology  Pt received prednisone taper and has been taking  States that her blood sugars have been high due to this but she has been covering them  She reports 14 units of humalog given for blood glucose >300 this am  Pt states that she checks her blood sugar three times daily before meals and at bedtime  She states she does know the signs of high and low blood sugar and she does not hesitate to recheck if she feels \"off\"  Confirmed with pt PCP appointment set for this Friday 5/5 at 1pm  States she has a pulmonology appointment already set as well  Pt denies any other needs  Julio Elias RN CM on care team for continued follow up        "

## 2023-05-05 ENCOUNTER — OFFICE VISIT (OUTPATIENT)
Dept: INTERNAL MEDICINE CLINIC | Facility: CLINIC | Age: 73
End: 2023-05-05

## 2023-05-05 VITALS
SYSTOLIC BLOOD PRESSURE: 130 MMHG | OXYGEN SATURATION: 96 % | BODY MASS INDEX: 47.22 KG/M2 | HEIGHT: 62 IN | HEART RATE: 76 BPM | DIASTOLIC BLOOD PRESSURE: 70 MMHG | WEIGHT: 256.6 LBS

## 2023-05-05 DIAGNOSIS — I50.32 CHRONIC DIASTOLIC HEART FAILURE (HCC): ICD-10-CM

## 2023-05-05 DIAGNOSIS — J96.11 CHRONIC RESPIRATORY FAILURE WITH HYPOXIA AND HYPERCAPNIA (HCC): Primary | Chronic | ICD-10-CM

## 2023-05-05 DIAGNOSIS — E11.65 TYPE 2 DIABETES MELLITUS WITH HYPERGLYCEMIA, WITHOUT LONG-TERM CURRENT USE OF INSULIN (HCC): ICD-10-CM

## 2023-05-05 DIAGNOSIS — I10 ESSENTIAL HYPERTENSION: ICD-10-CM

## 2023-05-05 DIAGNOSIS — R91.1 LUNG NODULE: ICD-10-CM

## 2023-05-05 DIAGNOSIS — C50.811 MALIGNANT NEOPLASM OF OVERLAPPING SITES OF RIGHT FEMALE BREAST, UNSPECIFIED ESTROGEN RECEPTOR STATUS (HCC): ICD-10-CM

## 2023-05-05 DIAGNOSIS — N18.32 STAGE 3B CHRONIC KIDNEY DISEASE (HCC): ICD-10-CM

## 2023-05-05 DIAGNOSIS — J45.41 MODERATE PERSISTENT ASTHMA WITH ACUTE EXACERBATION: ICD-10-CM

## 2023-05-05 DIAGNOSIS — G47.33 OSA (OBSTRUCTIVE SLEEP APNEA): ICD-10-CM

## 2023-05-05 DIAGNOSIS — I48.0 PAROXYSMAL ATRIAL FIBRILLATION (HCC): ICD-10-CM

## 2023-05-05 DIAGNOSIS — J96.12 CHRONIC RESPIRATORY FAILURE WITH HYPOXIA AND HYPERCAPNIA (HCC): Primary | Chronic | ICD-10-CM

## 2023-05-05 DIAGNOSIS — D64.9 ANEMIA, UNSPECIFIED TYPE: ICD-10-CM

## 2023-05-05 DIAGNOSIS — D69.6 PLATELETS DECREASED (HCC): ICD-10-CM

## 2023-05-05 DIAGNOSIS — K21.9 GASTROESOPHAGEAL REFLUX DISEASE WITHOUT ESOPHAGITIS: ICD-10-CM

## 2023-05-05 DIAGNOSIS — I47.1 PSVT (PAROXYSMAL SUPRAVENTRICULAR TACHYCARDIA) (HCC): ICD-10-CM

## 2023-05-05 RX ORDER — OMEPRAZOLE 20 MG/1
20 CAPSULE, DELAYED RELEASE ORAL
Qty: 30 CAPSULE | Refills: 0 | Status: SHIPPED | OUTPATIENT
Start: 2023-05-05 | End: 2023-11-01

## 2023-05-05 NOTE — ASSESSMENT & PLAN NOTE
Lab Results   Component Value Date    EGFR 27 04/30/2023    EGFR 30 04/29/2023    EGFR 28 04/28/2023    CREATININE 1 82 (H) 04/30/2023    CREATININE 1 67 (H) 04/29/2023    CREATININE 1 79 (H) 04/28/2023   GFR is baseline  Creat is baseline  Electrolytes stable  Recommend periodic blood test for monitoring of  Renal Function, lytes, GFR and urine for MA/Creat  Avoid Non Steroidal Antiinflammatory such as ibuprofen, aleve etc     Bone and Mineral disease monitoring as appropriate      All patient with GFR less than 30( CKD 4 or 5 or 6) are advised to follow with nephrologist

## 2023-05-05 NOTE — PROGRESS NOTES
Name: Celeste Sanford      : 1950      MRN: [id number]  Encounter Provider: Nikki Fox MD  Encounter Date: 2023   Encounter department: [address]     1  Chronic respiratory failure with hypoxia and hypercapnia (HCC)  Assessment & Plan: This time PCO2 was not too bad on admission 45  Now patient is using CPAP regularly  On nasal O2 at 1 at bedtime  Hopefully this should really help her with chronic respiratory failure  She will be buying a pulse oximeter at home      2  Type 2 diabetes mellitus with hyperglycemia, without long-term current use of insulin (HCC)  Assessment & Plan:    Lab Results   Component Value Date    HGBA1C 8 1 (H) 2023   Blood sugar were high as expected due to steroid ,emains on Humalog 14 units with meal and Lantus 28 units twice a day which was increased to 32 units twice a day    but now settling down this morning it was 120 no hypoglycemia  Ranges between 101 150  Due for hemoglobin A1c in end of   Continue same diabetic regimen including Ozempic 1 mg weekly, Toujeo 25 units twice daily as well as Humalog 10 units with each meal patient titrates according to his sugar      3  Gastroesophageal reflux disease without esophagitis  Assessment & Plan:  Recently getting heartburn  Probably exacerbated by steroid  Will add in 1 month of omeprazole 20 mg daily    Orders:  -     omeprazole (PriLOSEC) 20 mg delayed release capsule; Take 1 capsule (20 mg total) by mouth daily before breakfast    4  Moderate persistent asthma with acute exacerbation  Assessment & Plan:  Acute asthma flareup much better  At home on [facility] or generic equivalent as well as albuterol nebulizer usually 3 times a day  Finishing a prednisone  Tolerating fairly well  Improved functional capacity  Mild chronic expiratory wheeze to be expected      5   Lung nodule  Assessment & Plan:  CT scan in ER did not reveal any change in nodules they are stable      6  KATRINA (obstructive sleep apnea)  Assessment & Plan:  Now on CPAP at nighttime with 1 L O2  Patient will follow with pulmonologist      7  Paroxysmal atrial fibrillation (HCC)  Assessment & Plan:  In ER heart rate was 150  Received with Cardizem 15 mg  Heart rate came down to 110 on EKG  Also on anticoagulants  8  Essential hypertension  Assessment & Plan:  Hypertension fair control  Remains on metoprolol succinate 50 mg it is increased to twice a day  Heart rate is reasonable  Tolerating fairly well  9  Chronic diastolic heart failure (HCC)  Assessment & Plan:  Wt Readings from Last 3 Encounters:   05/05/23 116 kg (256 lb 9 6 oz)   04/29/23 116 kg (255 lb 11 7 oz)   04/28/23 117 kg (259 lb)       Weight stable  Heart failure is compensated  Remains on Eliquis, diabetic care, metoprolol succinate 50 mg twice a day, Crestor 10 mg daily, torsemide 20 mg  2 tablet daily in the morning  10  PSVT (paroxysmal supraventricular tachycardia) ([facility])  Assessment & Plan:  Episode of PSVT in the emergency room now on metoprolol 50-minute twice a day remains on Eliquis tolerating no bleeding pulse regular blood pressure fair  Tolerating no side effect of medications      11  Stage 3b chronic kidney disease [facility])  Assessment & Plan:  Lab Results   Component Value Date    EGFR 27 04/30/2023    EGFR 30 04/29/2023    EGFR 28 04/28/2023    CREATININE 1 82 (H) 04/30/2023    CREATININE 1 67 (H) 04/29/2023    CREATININE 1 79 (H) 04/28/2023   GFR is baseline  Creat is baseline  Electrolytes stable  Recommend periodic blood test for monitoring of  Renal Function, lytes, GFR and urine for MA/Creat  Avoid Non Steroidal Antiinflammatory such as ibuprofen, aleve etc     Bone and Mineral disease monitoring as appropriate      All patient with GFR less than 30( CKD 4 or 5 or 6) are advised to follow with nephrologist         12  Anemia, unspecified type  Assessment & Plan:  Lab Results Component Value Date    WBC 9 70 04/28/2023    HGB 11 6 04/28/2023    HCT 35 5 04/28/2023    MCV 92 04/28/2023     04/28/2023           13  Platelets decreased ([address] )  Assessment & Plan:  Lab Results   Component Value Date    WBC 9 70 04/28/2023    HGB 11 6 04/28/2023    HCT 35 5 04/28/2023    MCV 92 04/28/2023     04/28/2023           14  Malignant neoplasm of overlapping sites of right female breast, unspecified estrogen receptor status [facility])  Assessment & Plan:  Right breast biopsy is rescheduled for May 30 per patient  Subjective     TCM Call     Date and time call was made  5/1/2023  9:43 AM    Hospital care reviewed  Records reviewed    Patient was hospitialized at  [address] [place] [address]    Date of Admission  04/28/23    Date of discharge  04/30/23    Diagnosis  Moderate persistent asthma with acute exacerbation    Disposition  Home    Were the patients medications reviewed and updated  Yes    Current Symptoms  None    Cough Severity  Mild  Pt says it is better, almost baseline    Shortness of breath severity  Mild  pt reports that it is better    Weakness severity  Mild  She has PT coming  TCM Call     Post hospital issues  None    Should patient be enrolled in anticoag monitoring? No    Scheduled for follow up?   Yes    Patients specialists  Cardiologist; Pulmonlolgist    Cardiologist name  Dr Omar Tristan    Cardiologist contact #  [phone]    Pulmonologist name  Dr Anthony Lyles contact #  [phone]    Endocrinologist name  [phone]    Nephrologist name  Dr Rina Byrne    Nephrologist contact #  [phone]    Did you obtain your prescribed medications  Yes    Do you need help managing your prescriptions or medications  No    Is transportation to your appointment needed  No    Specify why  Her Neice drives her    I have advised the patient to call PCP with any new or worsening symptoms    [facility]    The type of support provided  Emotional; Physical    Do you have social support  Yes, as much as I need    Are you recieving any outpatient services  No    Are you recieving home care services  No    Types of home care services  Home PT; Nurse visit    Are you using any community resources  No    Current waiver services  No    Have you fallen in the last 12 months  No    How many times  1    Interperter language line needed  No    Counseling  Patient; Family    Counseling topics  Importance of RX compliance; instructions for   management    Comments  Spoke to Out pt  about her needs  Will get her a   nebulizer      Hospital care reviewed  Patient presented to the emergency room with significant shortness of breath wheezing  Patient was referred to the emergency room  Patient was hospitalized 1 month ago with acute on chronic respiratory failure secondary to reactive airway disease exacerbation  Patient's eosinophil count is 8  CT scan of the chest did not reveal any pneumonia does have scattered bilateral nodules subcentimeter which has been stable  Patient received IV steroid Zithromax  Patient was tapered and transition to prednisone  Mucinex and Singulair were given  Patient remains on Breo or equivalent Wixela on discharge  Also uses nebulizer  Much better at this time functional capacity better finishing a prednisone  Per similar supraventricular tachyarrhythmia patient's heart rate in the emergency room 1 was 150  EKG revealed sinus tachycardia at 11  Heart rate went up to 130 on the floor  EKG revealed SVT with a rate of 1 20-1 40  Patient received IV Cardizem  Toprol were increased to twice a day  Pulses regular heart rate is better  Chronic disease heart failure: Remains on torsemide 40 mg daily echocardiogram it revealed ejection fraction 50 to 55%  Visit obesity remains unchanged, obstructive sleep apnea on CPAP 7 cm O2 and 1 L oxygen at bedtime    To be followed by cardiologist   CKD level baseline  Diabetes hemoglobin A1c 8 1 blood sugar noted remains on Humalog 14 units with meal and Lantus 28 units twice a day which was increased to 32 units twice a day    Chronic respiratory failure with hypoxia and hypercapnia (HCC)  Pulmonary wise patient has asthma, sleep apnea, restrictive pulmonary disease, obesity, hypoventilation syndrome, chronic respiratory failure, continue same regimen    Months overlapping multiple quadrant right breast patient scheduled for surgery now lumpectomy end of the May  GERD if worse with the steroid will give 4 weeks of omeprazole  Hyperlipidemia continue statin  Mixed hyperlipidemia  [facility] Course:   Stephen Domingo is a 81 y o  female patientwwith past medical history of asthma, obesity hypoventilation, CHF, hypertension, obesity, CKD, hypertension, diabetes ho originally presented to the hospital on 4/28/2023 due to worsening shortness of breath and cough from baseline for the past 2 days  Patient admitted to hospital for asthma exacerbation and was treated with IV steroids and azithromycin  Patient also had some SVT episodes and was seen by cardiology and metoprolol dose was increased  Patient was seen by pulmonology  Patient to be discharged on prednisone taper with an outpatient follow-up with pulmonology/cardiology  's     Generally much better  Cough is better breathing is better functional capacity is better patient drove herself  Offers no specific complaint other than heartburn  Denies any nausea vomiting abdominal pain  Blood sugar is improving  Offers no complaints        Review of Systems   Constitutional: Negative for chills, fatigue and fever  HENT: Negative for ear pain and sore throat  Eyes: Negative for pain and visual disturbance  Respiratory: Negative for cough and shortness of breath  Cardiovascular: Negative for chest pain and palpitations     Gastrointestinal: Negative for abdominal pain, constipation, diarrhea and vomiting  Endocrine: Negative for polydipsia  Genitourinary: Negative for dysuria and hematuria  Musculoskeletal: Negative for arthralgias, back pain and myalgias  Skin: Negative for color change and rash  Neurological: Negative for dizziness, seizures, syncope and headaches  Psychiatric/Behavioral: Negative for agitation, confusion and hallucinations  All other systems reviewed and are negative        Past Medical History:   Diagnosis Date    [facility]STIAN [facility]) ([facility] ) 01/23/2023    Anemia     Asthma     Atrial fibrillation (HCC)     Chronic kidney disease     COVID-19 January 2022    Diabetes mellitus ([place] [address] )     GERD (gastroesophageal reflux disease)     Hyperlipidemia     Hypertension     PONV (postoperative nausea and vomiting)     Sleep apnea     wear BIPAP    SVT (supraventricular tachycardia) ([place] [address] )      Past Surgical History:   Procedure Laterality Date    APPENDECTOMY      BREAST BIOPSY Right     years ago when she was 26    BREAST BIOPSY Right 03/13/2023    CYSTOSCOPY W/ LASER LITHOTRIPSY Left 07/12/2016    Procedure: CYSTOSCOPY URETEROSCOPY WITH LITHOTRIPSY HOLMIUM LASER, RETROGRADE PYELOGRAM AND INSERTION STENT URETERAL;  Surgeon: Marley Juan MD;  Location: [address] [place] [address];  Service:     DILATION AND CURETTAGE OF UTERUS      IR THORACENTESIS  03/18/2022    JOINT REPLACEMENT      right knee    KNEE ARTHROPLASTY Right     MAMMO NEEDLE LOCALIZATION LEFT (ALL INC) EACH ADD Right 4/24/2023    MAMMO STEREOTACTIC BREAST BIOPSY RIGHT (ALL INC) Right 03/13/2023    High Risk Lesion    WV ARTHROPLASTY GLENOHUMERAL JOINT TOTAL SHOULDER Left 03/01/2022    Procedure: ARTHROPLASTY SHOULDER REVERSE;  Surgeon: Enrique Dangelo MD;  Location: [address] [place] [address];  Service: Orthopedics    WV CYSTO BLADDER W/URETERAL CATHETERIZATION Left 06/29/2016    Procedure: CYSTOSCOPY RETROGRADE PYELOGRAM WITH INSERTION STENT URETERAL, left;  Surgeon: Merry Olvera Torito Cavazos MD;  Location: [address] [place] [address];  Service: Urology    SHOULDER ARTHROTOMY Left      Family History   Problem Relation Age of Onset    Heart disease Mother     Diabetes Father     Thyroid cancer Sister     Heart disease Brother     Diabetes Brother     Heart disease Brother     Heart disease Brother     Emphysema Maternal Grandmother     Heart disease Family      Social History     Socioeconomic History    Marital status: Single     Spouse name: None    Number of children: 0    Years of education: 15    Highest education level: Associate degree: academic program   Occupational History    None   Tobacco Use    Smoking status: Former     Packs/day:      Years: 20      Pack years: 20 00     Types: Cigarettes     Quit date: 1996     Years since quittin 8    Smokeless tobacco: Never   Vaping Use    Vaping Use: Never used   Substance and Sexual Activity    Alcohol use: Not Currently     Comment: rarely    Drug use: Never    Sexual activity: Not Currently   Other Topics Concern    None   Social History Narrative    ** Merged History Encounter **          Social Determinants of Health     Financial Resource Strain: Not on file   Food Insecurity: No Food Insecurity    Worried About Running Out of Food in the Last Year: Never true    Wes of Food in the Last Year: Never true   Transportation Needs: No Transportation Needs    Lack of Transportation (Medical): No    Lack of Transportation (Non-Medical):  No   Physical Activity: Not on file   Stress: Not on file   Social Connections: Not on file   Intimate Partner Violence: Not on file   Housing Stability: Low Risk     Unable to Pay for Housing in the Last Year: No    Number of Places Lived in the Last Year: 2    Unstable Housing in the Last Year: No     Current Outpatient Medications on File Prior to Visit   Medication Sig    albuterol (PROVENTIL HFA,VENTOLIN HFA) 90 mcg/act inhaler Inhale 2 puffs every 6 (six) hours as needed for wheezing    ammonium lactate (LAC-HYDRIN) 12 % lotion APPLY TOPICALLY TO AFFECTED AREAS twice a day    apixaban (ELIQUIS) 5 mg Take 5 mg by mouth 2 (two) times a day    fluticasone (FLONASE) 50 mcg/act nasal spray 1 spray into each nostril daily    Fluticasone-Salmeterol (Wixela Inhub) 250-50 mcg/dose inhaler Inhale 1 puff 2 (two) times a day Rinse mouth after use   glucose blood (OneTouch Verio) test strip Use 1 each 4 (four) times a day Use as instructed    insulin glargine (Toujeo SoloStar) 300 units/mL CONCENTRATED U-300 injection pen (1-unit dial) Inject 25 Units under the skin every 12 (twelve) hours    insulin lispro (HumaLOG KwikPen) 100 units/mL injection pen Use 10 units prior to each meal +1 unit per 50 above 150 mg/dL    Insulin Pen Needle (BD Pen Needle Pilar 2nd Gen) 32G X 4 MM MISC For use with insulin pen  Pharmacy may dispense brand covered by insurance   Insulin Pen Needle (NovoFine Autocover) 30G X 8 MM MISC Inject under the skin daily    Insulin Pen Needle 30G X 8 MM MISC 2 (two) times a day    Insulin Pen Needle 31G X 8 MM MISC Use daily Inject under the skin    Lancets (onetouch ultrasoft) lancets Use once daily    metoprolol succinate (TOPROL-XL) 50 mg 24 hr tablet Take 1 tablet (50 mg total) by mouth 2 (two) times a day    montelukast (SINGULAIR) 10 mg tablet Take 10 mg by mouth daily at bedtime    NovoFine Autocover Pen Needle 30G X 8 MM MISC USE TWICE A DAY    nystatin (MYCOSTATIN) powder Apply topically 2 (two) times a day    predniSONE 20 mg tablet Take 2 tablets (40 mg total) by mouth daily for 5 days Do not start before May 1, 2023      pregabalin (LYRICA) 100 mg capsule take 1 capsule by mouth twice a day    rosuvastatin (CRESTOR) 10 MG tablet take 1 tablet by mouth once daily    semaglutide, 1 mg/dose, (Ozempic, 1 MG/DOSE,) 4 mg/3 mL injection pen Inject 0 75 mL (1 mg total) under the skin every 7 days    torsemide (DEMADEX) 20 mg tablet Take 2 tablets in "the AM     Allergies   Allergen Reactions    Penicillins Hives    Moxifloxacin Other (See Comments)     unknown    Oxycodone-Acetaminophen Other (See Comments)     GI upset    Zinc Acetate Other (See Comments)     unknown    Penicillins Hives    Asa [Aspirin] GI Intolerance    Indocin [Indomethacin] Other (See Comments)     Made patient \"loopy\"    Other Other (See Comments)     unknown    Tannic Acid Rash     Immunization History   Administered Date(s) Administered    COVID-19 MODERNA VACC 0 5 ML IM 02/11/2022    COVID-19 Moderna vac 6-11y or adult booster 50 mcg/0 5 mL 03/11/2022    Tdap 01/22/2023    Tuberculin Skin Test 02/20/2022    Unknown 02/11/2022       Objective     /70   Pulse 76   Ht 5' 2\" (1 575 m)   Wt 116 kg (256 lb 9 6 oz)   SpO2 96%   BMI 46 93 kg/m²     Physical Exam  Vitals and nursing note reviewed  Constitutional:       Appearance: Normal appearance  She is well-developed  She is obese  She is not ill-appearing  HENT:      Head: Normocephalic and atraumatic  Right Ear: External ear normal       Left Ear: External ear normal       Nose: Nose normal       Mouth/Throat:      Pharynx: Oropharynx is clear  Eyes:      General: Lids are normal       Conjunctiva/sclera: Conjunctivae normal    Neck:      Thyroid: No thyroid mass or thyromegaly  Vascular: No JVD  Trachea: Trachea normal    Cardiovascular:      Rate and Rhythm: Normal rate and regular rhythm  Pulses: Normal pulses  Heart sounds: Normal heart sounds  No murmur heard  Pulmonary:      Effort: Pulmonary effort is normal       Comments: Somewhat diminished air entry but this is to the baseline  She was able to walk without oxygen from car to here she did get little fatigued but her oxygen level was adequate  Abdominal:      General: Bowel sounds are normal  There is no distension  Tenderness: There is no abdominal tenderness  Musculoskeletal:         General: No deformity   Normal " range of motion  Cervical back: Normal range of motion  Right lower leg: No edema  Left lower leg: No edema  Skin:     General: Skin is warm  Coloration: Skin is not jaundiced or pale  Findings: No rash  Neurological:      Mental Status: She is alert  Sensory: No sensory deficit  Motor: No weakness        Coordination: Coordination normal       Gait: Gait normal    Psychiatric:         Mood and Affect: Mood normal          Judgment: Judgment normal        Amelia Hudson MD

## 2023-05-05 NOTE — ASSESSMENT & PLAN NOTE
Acute asthma flareup much better  At home on [facility] or generic equivalent as well as albuterol nebulizer usually 3 times a day  Finishing a prednisone  Tolerating fairly well  Improved functional capacity    Mild chronic expiratory wheeze to be expected

## 2023-05-05 NOTE — ASSESSMENT & PLAN NOTE
Episode of PSVT in the emergency room now on metoprolol 50-minute twice a day remains on Eliquis tolerating no bleeding pulse regular blood pressure fair    Tolerating no side effect of medications

## 2023-05-05 NOTE — PATIENT INSTRUCTIONS
You doing well  Continue and finish your prednisone  Continue Breo  Follow-up with the lung doctor  Continue nebulizer as needed but at least use it for next couple of weeks 3-4 times a day  See you back in July as scheduled  Continue your insulin regimen as before hospitalization  If sugar is remaining high let us know  Add omeprazole 20 mg daily for a month          US schedule or earlier anytime

## 2023-05-05 NOTE — ASSESSMENT & PLAN NOTE
Lab Results   Component Value Date    HGBA1C 8 1 (H) 03/25/2023   Blood sugar were high as expected due to steroid ,emains on Humalog 14 units with meal and Lantus 28 units twice a day which was increased to 32 units twice a day    but now settling down this morning it was 120 no hypoglycemia  Ranges between 101 150  Due for hemoglobin A1c in end of June    Continue same diabetic regimen including Ozempic 1 mg weekly, Toujeo 25 units twice daily as well as Humalog 10 units with each meal patient titrates according to his sugar

## 2023-05-05 NOTE — ASSESSMENT & PLAN NOTE
This time PCO2 was not too bad on admission 45  Now patient is using CPAP regularly  On nasal O2 at 1 at bedtime  Hopefully this should really help her with chronic respiratory failure    She will be buying a pulse oximeter at home

## 2023-05-05 NOTE — ASSESSMENT & PLAN NOTE
Lab Results   Component Value Date    WBC 9 70 04/28/2023    HGB 11 6 04/28/2023    HCT 35 5 04/28/2023    MCV 92 04/28/2023     04/28/2023

## 2023-05-05 NOTE — ASSESSMENT & PLAN NOTE
Recently getting heartburn  Probably exacerbated by steroid    Will add in 1 month of omeprazole 20 mg daily

## 2023-05-05 NOTE — ASSESSMENT & PLAN NOTE
Hypertension fair control  Remains on metoprolol succinate 50 mg it is increased to twice a day  Heart rate is reasonable  Tolerating fairly well

## 2023-05-05 NOTE — ASSESSMENT & PLAN NOTE
In ER heart rate was 150  Received with Cardizem 15 mg  Heart rate came down to 110 on EKG  Also on anticoagulants

## 2023-05-05 NOTE — ASSESSMENT & PLAN NOTE
Wt Readings from Last 3 Encounters:   05/05/23 116 kg (256 lb 9 6 oz)   04/29/23 116 kg (255 lb 11 7 oz)   04/28/23 117 kg (259 lb)       Weight stable  Heart failure is compensated  Remains on Eliquis, diabetic care, metoprolol succinate 50 mg twice a day, Crestor 10 mg daily, torsemide 20 mg  2 tablet daily in the morning

## 2023-05-12 ENCOUNTER — TELEPHONE (OUTPATIENT)
Dept: HEMATOLOGY ONCOLOGY | Facility: CLINIC | Age: 73
End: 2023-05-12

## 2023-05-12 NOTE — TELEPHONE ENCOUNTER
Appointment Change  Cancel, Reschedule, Change to Virtual      Who are you speaking with? Patient   If it is not the patient, are they listed on an active communication consent form? N/A   Which provider is the appointment scheduled with? Dr Joleen Navarrete   When is the appointment scheduled? Please list date and time  5/18/23 10:30AM   At which location is the appointment scheduled to take place? [facility]   Was the appointment rescheduled or changed from an in person visit to a virtual visit? If so, please list the details of the change  5/18/23 11:15AM   What is the reason for the appointment change? Provider   Was [facility] transport scheduled for this visit? No   Does [facility] transport need to be scheduled for the new visit (if applicable) No   Does the patient need an infusion appointment rescheduled? No   Does the patient have an infusion appointment scheduled? If so, when? No   Is the patient undergoing chemotherapy? No   Was the no-show policy reviewed for appointments being changed with less then 24 hours of notice?  Yes

## 2023-05-15 PROBLEM — C50.811 MALIGNANT NEOPLASM OF OVERLAPPING SITES OF RIGHT FEMALE BREAST (HCC): Status: RESOLVED | Noted: 2023-03-26 | Resolved: 2023-05-15

## 2023-05-15 PROBLEM — D24.1 INTRADUCTAL PAPILLOMA OF RIGHT BREAST: Status: ACTIVE | Noted: 2023-05-15

## 2023-05-18 ENCOUNTER — OFFICE VISIT (OUTPATIENT)
Dept: SURGICAL ONCOLOGY | Facility: CLINIC | Age: 73
End: 2023-05-18

## 2023-05-18 VITALS
SYSTOLIC BLOOD PRESSURE: 126 MMHG | BODY MASS INDEX: 48.21 KG/M2 | DIASTOLIC BLOOD PRESSURE: 70 MMHG | HEIGHT: 62 IN | HEART RATE: 72 BPM | RESPIRATION RATE: 15 BRPM | OXYGEN SATURATION: 93 % | WEIGHT: 262 LBS | TEMPERATURE: 97.7 F

## 2023-05-18 DIAGNOSIS — D24.1 INTRADUCTAL PAPILLOMA OF RIGHT BREAST: Primary | ICD-10-CM

## 2023-05-18 DIAGNOSIS — Z01.818 PRE-OP EXAMINATION: ICD-10-CM

## 2023-05-18 NOTE — PROGRESS NOTES
Surgical Oncology Follow Up  [facility]  [facility] Treva Jackson  [address]  Treva FLYNN [address] [facility]  1950  [phone]      Chief Complaint   Patient presents with   • Follow-up        Assessment & Plan:   Is a 53-year-old female with multiple medical condition and on CPAP morbid obesity and cardiac disease on Eliquis  She had an echocardiogram pending cardiology evaluation  She has Evelyn bracketed  Again we did discuss in detail the surgical risk infection bleeding cosmetic concerns given the last area, nonhealing wound were discussed  She understands is not a cancer operation if the final pathology demonstrated malignancy she will need additional surgery as well as treatment  We will proceed with surgery as planned pending cardiology optimization    Cancer History:     Oncology History    No history exists  Interval History:   Follow-up after Evelyn clip placement right breast     Review of Systems:   Review of Systems   Constitutional: Negative for chills and fever  HENT: Negative for ear pain and sore throat  Eyes: Negative for pain and visual disturbance  Respiratory: Negative for cough, shortness of breath and stridor  Cardiovascular: Negative for chest pain and palpitations  Gastrointestinal: Negative for abdominal pain and vomiting  Endocrine: Negative for cold intolerance and heat intolerance  Genitourinary: Negative for dysuria and hematuria  Musculoskeletal: Negative for arthralgias and back pain  Skin: Negative for color change and rash  Neurological: Negative for seizures and syncope  All other systems reviewed and are negative        Past Medical History     Patient Active Problem List   Diagnosis   • Moderate persistent asthma with acute exacerbation   • Morbid obesity with BMI of 45 0-49 9, adult (HCC)   • Lower leg edema   • Paroxysmal atrial fibrillation (HCC)   • Mixed hyperlipidemia   • Anemia • Essential hypertension   • PONV (postoperative nausea and vomiting)   • Gastroesophageal reflux disease   • Nephrolithiasis   • Arthritis   • S/P total knee replacement, right   • On continuous oral anticoagulation - eliquis   • Reactive airway disease with acute exacerbation   • Status post reverse total replacement of left shoulder   • Stage 3 chronic kidney disease (HCC)   • Peripheral venous insufficiency   • Post-herpetic polyneuropathy   • Raynaud's disease   • Lung nodule   • Chronic diastolic heart failure ([facility])   • Multiple pulmonary nodules determined by computed tomography of lung   • Mass overlapping multiple quadrants of right breast   • Pressure injury of deep tissue of sacral region   • Acute on chronic respiratory failure with hypoxia and hypercapnia ([facility])   • Obesity hypoventilation syndrome ([facility])   • KATRINA (obstructive sleep apnea)   • Elevated serum creatinine   • Chronic respiratory failure with hypoxia and hypercapnia ([facility])   • Platelets decreased ([facility])   • PSVT (paroxysmal supraventricular tachycardia) ([facility])   • Rash   • Impaired mobility and ADLs   • Pressure injury of sacral region, stage 2 ([facility])   • Blister of right heel   • Heel blister   • Fall   • Syncope   • Type 2 diabetes mellitus with hyperglycemia, without long-term current use of insulin ([facility])   • Abnormal CT scan, chest   • Allergies   • Abnormal mammogram   • Intraductal papilloma of right breast     Past Medical History:   Diagnosis Date   • SEBASTIAN (acute kidney injury) [facility] 01/23/2023   • Anemia    • Asthma    • Atrial fibrillation ([facility])    • Chronic kidney disease    • COVID-19 January 2022   • Diabetes mellitus ([facility] )    • GERD (gastroesophageal reflux disease)    • Hyperlipidemia    • Hypertension    • PONV (postoperative nausea and vomiting)    • Sleep apnea     wear BIPAP   • SVT (supraventricular tachycardia) ([facility] )      Past Surgical History:   Procedure Laterality Date   • APPENDECTOMY     • BREAST BIOPSY Right years ago when she was 23   • BREAST BIOPSY Right 2023   • CYSTOSCOPY W/ LASER LITHOTRIPSY Left 2016    Procedure: CYSTOSCOPY URETEROSCOPY WITH LITHOTRIPSY HOLMIUM LASER, RETROGRADE PYELOGRAM AND INSERTION STENT URETERAL;  Surgeon: Valerie Massey MD;  Location: [address] [place] [address];  Service:    • DILATION AND CURETTAGE OF UTERUS     • IR THORACENTESIS  2022   • JOINT REPLACEMENT      right knee   • KNEE ARTHROPLASTY Right    • MAMMO NEEDLE LOCALIZATION LEFT (ALL INC) EACH ADD Right 2023   • MAMMO STEREOTACTIC BREAST BIOPSY RIGHT (ALL INC) Right 2023    High Risk Lesion   • WV ARTHROPLASTY GLENOHUMERAL JOINT TOTAL SHOULDER Left 2022    Procedure: ARTHROPLASTY SHOULDER REVERSE;  Surgeon: Sean Menchaca MD;  Location: [facility];  Service: Orthopedics   • WV CYSTO BLADDER W/URETERAL CATHETERIZATION Left 2016    Procedure: CYSTOSCOPY RETROGRADE PYELOGRAM WITH INSERTION STENT URETERAL, left;  Surgeon: Valerie Massey MD;  Location: [facility];  Service: Urology   • SHOULDER ARTHROTOMY Left      Family History   Problem Relation Age of Onset   • Heart disease Mother    • Diabetes Father    • Thyroid cancer Sister    • Heart disease Brother    • Diabetes Brother    • Heart disease Brother    • Heart disease Brother    • Emphysema Maternal Grandmother    • Heart disease Family      Social History     Socioeconomic History   • Marital status: Single     Spouse name: Not on file   • Number of children: 0   • Years of education: 15   • Highest education level: Associate degree: academic program   Occupational History   • Not on file   Tobacco Use   • Smoking status: Former     Packs/day: 1 00     Years: 20 00     Pack years: 20 00     Types: Cigarettes     Quit date: 1996     Years since quittin 8   • Smokeless tobacco: Never   Vaping Use   • Vaping Use: Never used   Substance and Sexual Activity   • Alcohol use: Not Currently     Comment: rarely   • Drug use: Never • Sexual activity: Not Currently   Other Topics Concern   • Not on file   Social History Narrative    ** Merged History Encounter **          Social Determinants of Health     Financial Resource Strain: Not on file   Food Insecurity: No Food Insecurity   • Worried About [address] in the Last Year: Never true   • Ran Out of Food in the Last Year: Never true   Transportation Needs: No Transportation Needs   • Lack of Transportation (Medical): No   • Lack of Transportation (Non-Medical): No   Physical Activity: Not on file   Stress: Not on file   Social Connections: Not on file   Intimate Partner Violence: Not on file   Housing Stability: Low Risk    • Unable to Pay for Housing in the Last Year: No   • Number of Places Lived in the Last Year: 2   • Unstable Housing in the Last Year: No       Current Outpatient Medications:   •  albuterol (PROVENTIL HFA,VENTOLIN HFA) 90 mcg/act inhaler, Inhale 2 puffs every 6 (six) hours as needed for wheezing, Disp: , Rfl:   •  ammonium lactate (LAC-HYDRIN) 12 % lotion, APPLY TOPICALLY TO AFFECTED AREAS twice a day, Disp: , Rfl:   •  apixaban (ELIQUIS) 5 mg, Take 5 mg by mouth 2 (two) times a day, Disp: , Rfl:   •  fluticasone (FLONASE) 50 mcg/act nasal spray, 1 spray into each nostril daily, Disp: , Rfl: 0  •  glucose blood (OneTouch Verio) test strip, Use 1 each 4 (four) times a day Use as instructed, Disp: 400 strip, Rfl: 1  •  insulin glargine (Toujeo SoloStar) 300 units/mL CONCENTRATED U-300 injection pen (1-unit dial), Inject 25 Units under the skin every 12 (twelve) hours, Disp: , Rfl:   •  insulin lispro (HumaLOG KwikPen) 100 units/mL injection pen, Use 10 units prior to each meal +1 unit per 50 above 150 mg/dL, Disp: 15 mL, Rfl: 2  •  Insulin Pen Needle (BD Pen Needle Pilar 2nd Gen) 32G X 4 MM MISC, For use with insulin pen   Pharmacy may dispense brand covered by insurance , Disp: 100 each, Rfl: 0  •  Insulin Pen Needle (NovoFine Autocover) 30G X 8 MM MISC, Inject "under the skin daily, Disp: 100 each, Rfl: 3  •  Insulin Pen Needle 30G X 8 MM MISC, 2 (two) times a day, Disp: , Rfl:   •  Lancets (onetouch ultrasoft) lancets, Use once daily, Disp: 100 each, Rfl: 2  •  metoprolol succinate (TOPROL-XL) 50 mg 24 hr tablet, Take 1 tablet (50 mg total) by mouth 2 (two) times a day, Disp: 30 tablet, Rfl: 0  •  montelukast (SINGULAIR) 10 mg tablet, Take 10 mg by mouth daily at bedtime, Disp: , Rfl:   •  NovoFine Autocover Pen Needle 30G X 8 MM MISC, USE TWICE A DAY, Disp: 300 each, Rfl: 5  •  nystatin (MYCOSTATIN) powder, Apply topically 2 (two) times a day, Disp: 15 g, Rfl: 0  •  omeprazole (PriLOSEC) 20 mg delayed release capsule, Take 1 capsule (20 mg total) by mouth daily before breakfast, Disp: 30 capsule, Rfl: 0  •  pregabalin (LYRICA) 100 mg capsule, take 1 capsule by mouth twice a day, Disp: 180 capsule, Rfl: 1  •  rosuvastatin (CRESTOR) 10 MG tablet, take 1 tablet by mouth once daily, Disp: 90 tablet, Rfl: 1  •  semaglutide, 1 mg/dose, (Ozempic, 1 MG/DOSE,) 4 mg/3 mL injection pen, Inject 0 75 mL (1 mg total) under the skin every 7 days, Disp: 9 mL, Rfl: 1  •  Fluticasone-Salmeterol (Wixela Inhub) 250-50 mcg/dose inhaler, Inhale 1 puff 2 (two) times a day Rinse mouth after use , Disp: 60 blister, Rfl: 3  •  Insulin Pen Needle 31G X 8 MM MISC, Use daily Inject under the skin, Disp: 100 each, Rfl: 2  •  torsemide (DEMADEX) 20 mg tablet, Take 2 tablets in the AM, Disp: 90 tablet, Rfl: 1  Allergies   Allergen Reactions   • Penicillins Hives   • Moxifloxacin Other (See Comments)     unknown   • Oxycodone-Acetaminophen Other (See Comments)     GI upset   • Zinc Acetate Other (See Comments)     unknown   • Penicillins Hives   • Asa [Aspirin] GI Intolerance   • Indocin [Indomethacin] Other (See Comments)     Made patient \"loopy\"   • Other Other (See Comments)     unknown   • Tannic Acid Rash       Physical Exam:     Vitals:    05/18/23 1142   BP: 126/70   Pulse: 72   Resp: 15   Temp: " 97 7 °F (36 5 °C)   SpO2: 93%     Physical Exam  Constitutional:       Appearance: Normal appearance  HENT:      Head: Normocephalic and atraumatic  Nose: Nose normal       Mouth/Throat:      Mouth: Mucous membranes are moist    Eyes:      Pupils: Pupils are equal, round, and reactive to light  Cardiovascular:      Rate and Rhythm: Normal rate  Pulses: Normal pulses  Heart sounds: Normal heart sounds  Pulmonary:      Effort: Pulmonary effort is normal       Breath sounds: Normal breath sounds  Chest:      Comments: Right breast examination; no palpable mass or masses  No nipple discharge no nipple retraction  No skin changes     Right axillary and supraclavicular examination no palpable adenopathy  Right breast was examined in supine and prone position    Left breast examination no palpable mass ,masses ,nipple discharge, nipple retraction or skin changes  Left breast was also examined supine and prone position  Large pendulous breast  Abdominal:      General: Bowel sounds are normal       Palpations: Abdomen is soft  Musculoskeletal:         General: Normal range of motion  Cervical back: Normal range of motion and neck supple  Skin:     General: Skin is warm  Neurological:      General: No focal deficit present  Mental Status: She is alert and oriented to person, place, and time  Psychiatric:         Mood and Affect: Mood normal          Behavior: Behavior normal          Thought Content: Thought content normal          Judgment: Judgment normal            Results & Discussion:   CT chest:  Narrative & Impression   CT CHEST WITHOUT IV CONTRAST     INDICATION:   sob   Shortness of breath      COMPARISON: CT chest abdomen pelvis without contrast 3/26/2023      TECHNIQUE: CT examination of the chest was performed without intravenous contrast  Multiplanar 2D reformatted images were created from the source data      Radiation dose length product (DLP) for this visit:  99 [id number] mGy-cm   This examination, like all CT scans performed in the [facility], was performed utilizing techniques to minimize radiation dose exposure, including the use of iterative   reconstruction and automated exposure control      FINDINGS:     LUNGS:  There is no tracheal or endobronchial lesion      No focal consolidation or groundglass opacity      Small scattered bilateral pulmonary nodules  3 reference nodules:  - 0 5 cm left lower lobe solid pulmonary nodule (3:86), unchanged   - 0 5 cm right lower lobe solid pulmonary nodule (3:113), unchanged   - 0 5 cm right middle lobe solid pulmonary nodule (3:128), unchanged      PLEURA:  Unremarkable      HEART/GREAT VESSELS: Normal heart size  New trace pericardial effusion  Aortic valve and coronary artery calcifications  No thoracic aortic aneurysm  Mild scattered calcified atherosclerotic disease      MEDIASTINUM AND LUIGI:  Unremarkable      CHEST WALL AND LOWER NECK:  Unremarkable      VISUALIZED STRUCTURES IN THE UPPER ABDOMEN:  Unremarkable      OSSEOUS STRUCTURES:  No acute fracture or destructive osseous lesion  Left reverse shoulder total arthroplasty  Diffuse idiopathic skeletal hyperostosis (DISH)     IMPRESSION:     New trace pericardial effusion  Recommend correlation with echocardiogram      Small scattered bilateral pulmonary nodules measuring up to 0 5 cm  Based on current Fleischner Society 2017 Guidelines on incidental pulmonary nodule, no routine follow-up is needed if the patient is low risk  If the patient is high risk, optional   follow-up chest CT at 12 months can be considered         Echocardiogram:Findings    Left Ventricle Left ventricular cavity size is normal  Wall thickness is normal  Systolic function is normal   Wall motion is normal  Diastolic function is normal for age     Right Ventricle Right ventricular cavity size is normal  Systolic function is normal  Wall thickness is normal    Left Atrium The atrium is normal in size  Right Atrium The atrium is normal in size  Aortic Valve The aortic valve is trileaflet  The leaflets are mildly thickened  The leaflets are mildly calcified  The leaflets exhibit normal mobility  There is no evidence of regurgitation  The aortic valve has no significant stenosis  Mitral Valve Mitral valve structure is normal  There is trace regurgitation  There is no evidence of stenosis  Tricuspid Valve There is trace regurgitation  There is no evidence of stenosis  There is no indirect evidence of pulmonary hypertension  Pulmonic Valve There is trace regurgitation  There is no evidence of stenosis  Ascending Aorta The aortic root is normal in size  IVC/SVC The inferior vena cava is not well visualized  Pericardium There is a trivial pericardial effusion  I did review again surgical procedure benefit alternative and possible complications  I did discussed in detail nature of breast disorders including atypical papillary lesions and risk of being malignant and subsequent pathology after lumpectomy requiring additional procedures  she understands and  agrees   All patient questions were answered  Advance Care Planning/Advance Directives: Beatriz Wells MD discussed the disease status with Jacqueline Joel  today 05/18/23  treatment plans and follow-up with the patient

## 2023-05-18 NOTE — H&P (VIEW-ONLY)
Surgical Oncology Follow Up  [facility]  [facility] [address] [place] [address]  1950  [id number]      Chief Complaint   Patient presents with   • Follow-up        Assessment & Plan:   Is a 72-year-old female with multiple medical condition and on CPAP morbid obesity and cardiac disease on Eliquis  She had an echocardiogram pending cardiology evaluation  She has Evelyn bracketed  Again we did discuss in detail the surgical risk infection bleeding cosmetic concerns given the last area, nonhealing wound were discussed  She understands is not a cancer operation if the final pathology demonstrated malignancy she will need additional surgery as well as treatment  We will proceed with surgery as planned pending cardiology optimization    Cancer History:     Oncology History    No history exists  Interval History:   Follow-up after Evelyn clip placement right breast     Review of Systems:   Review of Systems   Constitutional: Negative for chills and fever  HENT: Negative for ear pain and sore throat  Eyes: Negative for pain and visual disturbance  Respiratory: Negative for cough, shortness of breath and stridor  Cardiovascular: Negative for chest pain and palpitations  Gastrointestinal: Negative for abdominal pain and vomiting  Endocrine: Negative for cold intolerance and heat intolerance  Genitourinary: Negative for dysuria and hematuria  Musculoskeletal: Negative for arthralgias and back pain  Skin: Negative for color change and rash  Neurological: Negative for seizures and syncope  All other systems reviewed and are negative        Past Medical History     Patient Active Problem List   Diagnosis   • Moderate persistent asthma with acute exacerbation   • Morbid obesity with BMI of 45 0-49 9, adult (HCC)   • Lower leg edema   • Paroxysmal atrial fibrillation (HCC)   • Mixed hyperlipidemia   • Anemia • Essential hypertension   • PONV (postoperative nausea and vomiting)   • Gastroesophageal reflux disease   • Nephrolithiasis   • Arthritis   • S/P total knee replacement, right   • On continuous oral anticoagulation - eliquis   • Reactive airway disease with acute exacerbation   • Status post reverse total replacement of left shoulder   • Stage 3 chronic kidney disease (HCC)   • Peripheral venous insufficiency   • Post-herpetic polyneuropathy   • Raynaud's disease   • Lung nodule   • Chronic diastolic heart failure ([facility])   • Multiple pulmonary nodules determined by computed tomography of lung   • Mass overlapping multiple quadrants of right breast   • Pressure injury of deep tissue of sacral region   • Acute on chronic respiratory failure with hypoxia and hypercapnia ([facility])   • Obesity hypoventilation syndrome ([facility])   • KATRINA (obstructive sleep apnea)   • Elevated serum creatinine   • Chronic respiratory failure with hypoxia and hypercapnia ([facility])   • Platelets decreased ([facility])   • PSVT (paroxysmal supraventricular tachycardia) ([facility])   • Rash   • Impaired mobility and ADLs   • Pressure injury of sacral region, stage 2 ([facility])   • Blister of right heel   • Heel blister   • Fall   • Syncope   • Type 2 diabetes mellitus with hyperglycemia, without long-term current use of insulin ([facility])   • Abnormal CT scan, chest   • Allergies   • Abnormal mammogram   • Intraductal papilloma of right breast     Past Medical History:   Diagnosis Date   • SEBASTIAN (acute kidney injury) ([facility] ) 01/23/2023   • Anemia    • Asthma    • Atrial fibrillation ([facility])    • Chronic kidney disease    • COVID-19 January 2022   • Diabetes mellitus ([facility] )    • GERD (gastroesophageal reflux disease)    • Hyperlipidemia    • Hypertension    • PONV (postoperative nausea and vomiting)    • Sleep apnea     wear BIPAP   • SVT (supraventricular tachycardia) ([facility] )      Past Surgical History:   Procedure Laterality Date   • APPENDECTOMY     • BREAST BIOPSY Right years ago when she was 23   • BREAST BIOPSY Right 2023   • CYSTOSCOPY W/ LASER LITHOTRIPSY Left 2016    Procedure: CYSTOSCOPY URETEROSCOPY WITH LITHOTRIPSY HOLMIUM LASER, RETROGRADE PYELOGRAM AND INSERTION STENT URETERAL;  Surgeon: Ramesh Bess MD;  Location: [address] [place] [address];  Service:    • DILATION AND CURETTAGE OF UTERUS     • IR THORACENTESIS  2022   • JOINT REPLACEMENT      right knee   • KNEE ARTHROPLASTY Right    • MAMMO NEEDLE LOCALIZATION LEFT (ALL INC) EACH ADD Right 2023   • MAMMO STEREOTACTIC BREAST BIOPSY RIGHT (ALL INC) Right 2023    High Risk Lesion   • TN ARTHROPLASTY GLENOHUMERAL JOINT TOTAL SHOULDER Left 2022    Procedure: ARTHROPLASTY SHOULDER REVERSE;  Surgeon: Nafisa Patel MD;  Location: [facility];  Service: Orthopedics   • TN CYSTO BLADDER W/URETERAL CATHETERIZATION Left 2016    Procedure: CYSTOSCOPY RETROGRADE PYELOGRAM WITH INSERTION STENT URETERAL, left;  Surgeon: Ramesh Bess MD;  Location: [facility];  Service: Urology   • SHOULDER ARTHROTOMY Left      Family History   Problem Relation Age of Onset   • Heart disease Mother    • Diabetes Father    • Thyroid cancer Sister    • Heart disease Brother    • Diabetes Brother    • Heart disease Brother    • Heart disease Brother    • Emphysema Maternal Grandmother    • Heart disease Family      Social History     Socioeconomic History   • Marital status: Single     Spouse name: Not on file   • Number of children: 0   • Years of education: 15   • Highest education level: Associate degree: academic program   Occupational History   • Not on file   Tobacco Use   • Smoking status: Former     Packs/day: 1 00     Years: 20 00     Pack years: 20 00     Types: Cigarettes     Quit date: 1996     Years since quittin 8   • Smokeless tobacco: Never   Vaping Use   • Vaping Use: Never used   Substance and Sexual Activity   • Alcohol use: Not Currently     Comment: rarely   • Drug use: Never • Sexual activity: Not Currently   Other Topics Concern   • Not on file   Social History Narrative    ** Merged History Encounter **          Social Determinants of Health     Financial Resource Strain: Not on file   Food Insecurity: No Food Insecurity   • Worried About [address] in the Last Year: Never true   • Ran Out of Food in the Last Year: Never true   Transportation Needs: No Transportation Needs   • Lack of Transportation (Medical): No   • Lack of Transportation (Non-Medical): No   Physical Activity: Not on file   Stress: Not on file   Social Connections: Not on file   Intimate Partner Violence: Not on file   Housing Stability: Low Risk    • Unable to Pay for Housing in the Last Year: No   • Number of Places Lived in the Last Year: 2   • Unstable Housing in the Last Year: No       Current Outpatient Medications:   •  albuterol (PROVENTIL HFA,VENTOLIN HFA) 90 mcg/act inhaler, Inhale 2 puffs every 6 (six) hours as needed for wheezing, Disp: , Rfl:   •  ammonium lactate (LAC-HYDRIN) 12 % lotion, APPLY TOPICALLY TO AFFECTED AREAS twice a day, Disp: , Rfl:   •  apixaban (ELIQUIS) 5 mg, Take 5 mg by mouth 2 (two) times a day, Disp: , Rfl:   •  fluticasone (FLONASE) 50 mcg/act nasal spray, 1 spray into each nostril daily, Disp: , Rfl: 0  •  glucose blood (OneTouch Verio) test strip, Use 1 each 4 (four) times a day Use as instructed, Disp: 400 strip, Rfl: 1  •  insulin glargine (Toujeo SoloStar) 300 units/mL CONCENTRATED U-300 injection pen (1-unit dial), Inject 25 Units under the skin every 12 (twelve) hours, Disp: , Rfl:   •  insulin lispro (HumaLOG KwikPen) 100 units/mL injection pen, Use 10 units prior to each meal +1 unit per 50 above 150 mg/dL, Disp: 15 mL, Rfl: 2  •  Insulin Pen Needle (BD Pen Needle Pilar 2nd Gen) 32G X 4 MM MISC, For use with insulin pen   Pharmacy may dispense brand covered by insurance , Disp: 100 each, Rfl: 0  •  Insulin Pen Needle (NovoFine Autocover) 30G X 8 MM MISC, Inject "under the skin daily, Disp: 100 each, Rfl: 3  •  Insulin Pen Needle 30G X 8 MM MISC, 2 (two) times a day, Disp: , Rfl:   •  Lancets (onetouch ultrasoft) lancets, Use once daily, Disp: 100 each, Rfl: 2  •  metoprolol succinate (TOPROL-XL) 50 mg 24 hr tablet, Take 1 tablet (50 mg total) by mouth 2 (two) times a day, Disp: 30 tablet, Rfl: 0  •  montelukast (SINGULAIR) 10 mg tablet, Take 10 mg by mouth daily at bedtime, Disp: , Rfl:   •  NovoFine Autocover Pen Needle 30G X 8 MM MISC, USE TWICE A DAY, Disp: 300 each, Rfl: 5  •  nystatin (MYCOSTATIN) powder, Apply topically 2 (two) times a day, Disp: 15 g, Rfl: 0  •  omeprazole (PriLOSEC) 20 mg delayed release capsule, Take 1 capsule (20 mg total) by mouth daily before breakfast, Disp: 30 capsule, Rfl: 0  •  pregabalin (LYRICA) 100 mg capsule, take 1 capsule by mouth twice a day, Disp: 180 capsule, Rfl: 1  •  rosuvastatin (CRESTOR) 10 MG tablet, take 1 tablet by mouth once daily, Disp: 90 tablet, Rfl: 1  •  semaglutide, 1 mg/dose, (Ozempic, 1 MG/DOSE,) 4 mg/3 mL injection pen, Inject 0 75 mL (1 mg total) under the skin every 7 days, Disp: 9 mL, Rfl: 1  •  Fluticasone-Salmeterol (Wixela Inhub) 250-50 mcg/dose inhaler, Inhale 1 puff 2 (two) times a day Rinse mouth after use , Disp: 60 blister, Rfl: 3  •  Insulin Pen Needle 31G X 8 MM MISC, Use daily Inject under the skin, Disp: 100 each, Rfl: 2  •  torsemide (DEMADEX) 20 mg tablet, Take 2 tablets in the AM, Disp: 90 tablet, Rfl: 1  Allergies   Allergen Reactions   • Penicillins Hives   • Moxifloxacin Other (See Comments)     unknown   • Oxycodone-Acetaminophen Other (See Comments)     GI upset   • Zinc Acetate Other (See Comments)     unknown   • Penicillins Hives   • Asa [Aspirin] GI Intolerance   • Indocin [Indomethacin] Other (See Comments)     Made patient \"loopy\"   • Other Other (See Comments)     unknown   • Tannic Acid Rash       Physical Exam:     Vitals:    05/18/23 1142   BP: 126/70   Pulse: 72   Resp: 15   Temp: " 97 7 °F (36 5 °C)   SpO2: 93%     Physical Exam  Constitutional:       Appearance: Normal appearance  HENT:      Head: Normocephalic and atraumatic  Nose: Nose normal       Mouth/Throat:      Mouth: Mucous membranes are moist    Eyes:      Pupils: Pupils are equal, round, and reactive to light  Cardiovascular:      Rate and Rhythm: Normal rate  Pulses: Normal pulses  Heart sounds: Normal heart sounds  Pulmonary:      Effort: Pulmonary effort is normal       Breath sounds: Normal breath sounds  Chest:      Comments: Right breast examination; no palpable mass or masses  No nipple discharge no nipple retraction  No skin changes     Right axillary and supraclavicular examination no palpable adenopathy  Right breast was examined in supine and prone position    Left breast examination no palpable mass ,masses ,nipple discharge, nipple retraction or skin changes  Left breast was also examined supine and prone position  Large pendulous breast  Abdominal:      General: Bowel sounds are normal       Palpations: Abdomen is soft  Musculoskeletal:         General: Normal range of motion  Cervical back: Normal range of motion and neck supple  Skin:     General: Skin is warm  Neurological:      General: No focal deficit present  Mental Status: She is alert and oriented to person, place, and time  Psychiatric:         Mood and Affect: Mood normal          Behavior: Behavior normal          Thought Content: Thought content normal          Judgment: Judgment normal            Results & Discussion:   CT chest:  Narrative & Impression   CT CHEST WITHOUT IV CONTRAST     INDICATION:   sob   Shortness of breath      COMPARISON: CT chest abdomen pelvis without contrast 3/26/2023      TECHNIQUE: CT examination of the chest was performed without intravenous contrast  Multiplanar 2D reformatted images were created from the source data      Radiation dose length product (DLP) for this visit:  99 [id number] mGy-cm   This examination, like all CT scans performed in the [facility], was performed utilizing techniques to minimize radiation dose exposure, including the use of iterative   reconstruction and automated exposure control      FINDINGS:     LUNGS:  There is no tracheal or endobronchial lesion      No focal consolidation or groundglass opacity      Small scattered bilateral pulmonary nodules  3 reference nodules:  - 0 5 cm left lower lobe solid pulmonary nodule (3:86), unchanged   - 0 5 cm right lower lobe solid pulmonary nodule (3:113), unchanged   - 0 5 cm right middle lobe solid pulmonary nodule (3:128), unchanged      PLEURA:  Unremarkable      HEART/GREAT VESSELS: Normal heart size  New trace pericardial effusion  Aortic valve and coronary artery calcifications  No thoracic aortic aneurysm  Mild scattered calcified atherosclerotic disease      MEDIASTINUM AND LUIGI:  Unremarkable      CHEST WALL AND LOWER NECK:  Unremarkable      VISUALIZED STRUCTURES IN THE UPPER ABDOMEN:  Unremarkable      OSSEOUS STRUCTURES:  No acute fracture or destructive osseous lesion  Left reverse shoulder total arthroplasty  Diffuse idiopathic skeletal hyperostosis (DISH)     IMPRESSION:     New trace pericardial effusion  Recommend correlation with echocardiogram      Small scattered bilateral pulmonary nodules measuring up to 0 5 cm  Based on current Fleischner Society 2017 Guidelines on incidental pulmonary nodule, no routine follow-up is needed if the patient is low risk  If the patient is high risk, optional   follow-up chest CT at 12 months can be considered         Echocardiogram:Findings    Left Ventricle Left ventricular cavity size is normal  Wall thickness is normal  Systolic function is normal   Wall motion is normal  Diastolic function is normal for age     Right Ventricle Right ventricular cavity size is normal  Systolic function is normal  Wall thickness is normal    Left Atrium The atrium is normal in size  Right Atrium The atrium is normal in size  Aortic Valve The aortic valve is trileaflet  The leaflets are mildly thickened  The leaflets are mildly calcified  The leaflets exhibit normal mobility  There is no evidence of regurgitation  The aortic valve has no significant stenosis  Mitral Valve Mitral valve structure is normal  There is trace regurgitation  There is no evidence of stenosis  Tricuspid Valve There is trace regurgitation  There is no evidence of stenosis  There is no indirect evidence of pulmonary hypertension  Pulmonic Valve There is trace regurgitation  There is no evidence of stenosis  Ascending Aorta The aortic root is normal in size  IVC/SVC The inferior vena cava is not well visualized  Pericardium There is a trivial pericardial effusion  I did review again surgical procedure benefit alternative and possible complications  I did discussed in detail nature of breast disorders including atypical papillary lesions and risk of being malignant and subsequent pathology after lumpectomy requiring additional procedures  she understands and  agrees   All patient questions were answered  Advance Care Planning/Advance Directives: Contreras Cook MD discussed the disease status with Connie Hernandez  today 05/18/23  treatment plans and follow-up with the patient

## 2023-05-24 ENCOUNTER — TELEPHONE (OUTPATIENT)
Dept: CARDIOLOGY CLINIC | Facility: CLINIC | Age: 73
End: 2023-05-24

## 2023-05-24 DIAGNOSIS — J45.41 MODERATE PERSISTENT ASTHMA WITH ACUTE EXACERBATION: Primary | ICD-10-CM

## 2023-05-24 DIAGNOSIS — I48.0 PAROXYSMAL ATRIAL FIBRILLATION (HCC): ICD-10-CM

## 2023-05-24 RX ORDER — METOPROLOL SUCCINATE 50 MG/1
50 TABLET, EXTENDED RELEASE ORAL 2 TIMES DAILY
Qty: 60 TABLET | Refills: 1 | Status: SHIPPED | OUTPATIENT
Start: 2023-05-24

## 2023-05-24 RX ORDER — MONTELUKAST SODIUM 10 MG/1
10 TABLET ORAL
Qty: 30 TABLET | Refills: 1 | Status: SHIPPED | OUTPATIENT
Start: 2023-05-24

## 2023-05-24 NOTE — TELEPHONE ENCOUNTER
Patient was calling for clearance for upcoming procedure  She probably would need an appt? Was seen in hospital but not in our office since 5/2022 with Dr Rosa Elena Connelly  Can you please advise?

## 2023-05-25 NOTE — TELEPHONE ENCOUNTER
Think we can clear her if she has no change in symptoms  If she has any new symptoms and she need to be seen    We should ask her and document that she is feeling fine and she is able to walk

## 2023-05-25 NOTE — TELEPHONE ENCOUNTER
Procedure: RIGHT BREAST KRIS  DIRECTED LUMPECTOMY - KRIS BRACKETTED (Right: Breast)   Anesthesia type: General   Diagnosis: Mass overlapping multiple quadrants of right breast [N63 15]   Pre-op diagnosis: Mass overlapping multiple quadrants of right breast [N63 15]   Location: [facility] OR ROOM 01 / [facility]   Surgeons: Cassi Fang MD

## 2023-05-25 NOTE — TELEPHONE ENCOUNTER
Patient was was seen by me in the hospital in April 2023    What kind of surgery patient needs based on that we will decide

## 2023-05-30 ENCOUNTER — HOSPITAL ENCOUNTER (OUTPATIENT)
Facility: HOSPITAL | Age: 73
Setting detail: OUTPATIENT SURGERY
Discharge: HOME | End: 2023-05-30
Attending: SURGERY | Admitting: SURGERY
Payer: MEDICARE

## 2023-05-30 ENCOUNTER — APPOINTMENT (OUTPATIENT)
Dept: RADIOLOGY | Facility: HOSPITAL | Age: 73
End: 2023-05-30
Payer: MEDICARE

## 2023-05-30 ENCOUNTER — ANESTHESIA (OUTPATIENT)
Dept: PERIOP | Facility: HOSPITAL | Age: 73
End: 2023-05-30

## 2023-05-30 ENCOUNTER — ANESTHESIA EVENT (OUTPATIENT)
Dept: PERIOP | Facility: HOSPITAL | Age: 73
End: 2023-05-30

## 2023-05-30 VITALS
HEIGHT: 62 IN | OXYGEN SATURATION: 93 % | HEART RATE: 72 BPM | DIASTOLIC BLOOD PRESSURE: 63 MMHG | BODY MASS INDEX: 48.16 KG/M2 | WEIGHT: 261.69 LBS | SYSTOLIC BLOOD PRESSURE: 107 MMHG | RESPIRATION RATE: 18 BRPM | TEMPERATURE: 98.1 F

## 2023-05-30 DIAGNOSIS — N63.0 BREAST LUMP OR MASS: ICD-10-CM

## 2023-05-30 DIAGNOSIS — N63.15 MASS OVERLAPPING MULTIPLE QUADRANTS OF RIGHT BREAST: ICD-10-CM

## 2023-05-30 LAB
GLUCOSE SERPL-MCNC: 255 MG/DL (ref 65–140)
GLUCOSE SERPL-MCNC: 258 MG/DL (ref 65–140)

## 2023-05-30 PROCEDURE — 88361 TUMOR IMMUNOHISTOCHEM/COMPUT: CPT | Performed by: PATHOLOGY

## 2023-05-30 PROCEDURE — 88341 IMHCHEM/IMCYTCHM EA ADD ANTB: CPT | Performed by: PATHOLOGY

## 2023-05-30 PROCEDURE — 88342 IMHCHEM/IMCYTCHM 1ST ANTB: CPT | Performed by: PATHOLOGY

## 2023-05-30 PROCEDURE — 19301 PARTIAL MASTECTOMY: CPT | Performed by: SURGERY

## 2023-05-30 PROCEDURE — 82948 REAGENT STRIP/BLOOD GLUCOSE: CPT

## 2023-05-30 PROCEDURE — 19301 PARTIAL MASTECTOMY: CPT | Performed by: CLINICAL NURSE SPECIALIST

## 2023-05-30 PROCEDURE — 88307 TISSUE EXAM BY PATHOLOGIST: CPT | Performed by: PATHOLOGY

## 2023-05-30 PROCEDURE — 76098 X-RAY EXAM SURGICAL SPECIMEN: CPT | Performed by: SURGERY

## 2023-05-30 RX ORDER — DEXAMETHASONE SODIUM PHOSPHATE 10 MG/ML
INJECTION, SOLUTION INTRAMUSCULAR; INTRAVENOUS AS NEEDED
Status: DISCONTINUED | OUTPATIENT
Start: 2023-05-30 | End: 2023-05-30

## 2023-05-30 RX ORDER — MAGNESIUM HYDROXIDE 1200 MG/15ML
LIQUID ORAL AS NEEDED
Status: DISCONTINUED | OUTPATIENT
Start: 2023-05-30 | End: 2023-05-30 | Stop reason: HOSPADM

## 2023-05-30 RX ORDER — PROPOFOL 10 MG/ML
INJECTION, EMULSION INTRAVENOUS AS NEEDED
Status: DISCONTINUED | OUTPATIENT
Start: 2023-05-30 | End: 2023-05-30

## 2023-05-30 RX ORDER — FENTANYL CITRATE 50 UG/ML
INJECTION, SOLUTION INTRAMUSCULAR; INTRAVENOUS AS NEEDED
Status: DISCONTINUED | OUTPATIENT
Start: 2023-05-30 | End: 2023-05-30

## 2023-05-30 RX ORDER — DIPHENHYDRAMINE HYDROCHLORIDE 50 MG/ML
12.5 INJECTION INTRAMUSCULAR; INTRAVENOUS ONCE AS NEEDED
Status: DISCONTINUED | OUTPATIENT
Start: 2023-05-30 | End: 2023-05-30 | Stop reason: HOSPADM

## 2023-05-30 RX ORDER — SODIUM CHLORIDE, SODIUM LACTATE, POTASSIUM CHLORIDE, CALCIUM CHLORIDE 600; 310; 30; 20 MG/100ML; MG/100ML; MG/100ML; MG/100ML
INJECTION, SOLUTION INTRAVENOUS CONTINUOUS PRN
Status: DISCONTINUED | OUTPATIENT
Start: 2023-05-30 | End: 2023-05-30

## 2023-05-30 RX ORDER — PHENYLEPHRINE HCL IN 0.9% NACL 1 MG/10 ML
SYRINGE (ML) INTRAVENOUS AS NEEDED
Status: DISCONTINUED | OUTPATIENT
Start: 2023-05-30 | End: 2023-05-30

## 2023-05-30 RX ORDER — FENTANYL CITRATE/PF 50 MCG/ML
25 SYRINGE (ML) INJECTION
Status: DISCONTINUED | OUTPATIENT
Start: 2023-05-30 | End: 2023-05-30 | Stop reason: HOSPADM

## 2023-05-30 RX ORDER — LIDOCAINE HYDROCHLORIDE 20 MG/ML
INJECTION, SOLUTION EPIDURAL; INFILTRATION; INTRACAUDAL; PERINEURAL AS NEEDED
Status: DISCONTINUED | OUTPATIENT
Start: 2023-05-30 | End: 2023-05-30

## 2023-05-30 RX ORDER — HYDROMORPHONE HCL/PF 1 MG/ML
0.2 SYRINGE (ML) INJECTION
Status: DISCONTINUED | OUTPATIENT
Start: 2023-05-30 | End: 2023-05-30 | Stop reason: HOSPADM

## 2023-05-30 RX ORDER — VANCOMYCIN HYDROCHLORIDE 1 G/200ML
1000 INJECTION, SOLUTION INTRAVENOUS ONCE
Status: COMPLETED | OUTPATIENT
Start: 2023-05-30 | End: 2023-05-30

## 2023-05-30 RX ORDER — PROMETHAZINE HYDROCHLORIDE 25 MG/ML
12.5 INJECTION, SOLUTION INTRAMUSCULAR; INTRAVENOUS ONCE AS NEEDED
Status: COMPLETED | OUTPATIENT
Start: 2023-05-30 | End: 2023-05-30

## 2023-05-30 RX ADMIN — DEXAMETHASONE SODIUM PHOSPHATE 6 MG: 10 INJECTION, SOLUTION INTRAMUSCULAR; INTRAVENOUS at 10:00

## 2023-05-30 RX ADMIN — LIDOCAINE HYDROCHLORIDE 100 MG: 20 INJECTION, SOLUTION EPIDURAL; INFILTRATION; INTRACAUDAL; PERINEURAL at 09:49

## 2023-05-30 RX ADMIN — FENTANYL CITRATE 25 MCG: 50 INJECTION, SOLUTION INTRAMUSCULAR; INTRAVENOUS at 11:08

## 2023-05-30 RX ADMIN — Medication 100 MCG: at 10:05

## 2023-05-30 RX ADMIN — Medication 100 MCG: at 09:56

## 2023-05-30 RX ADMIN — FENTANYL CITRATE 25 MCG: 50 INJECTION, SOLUTION INTRAMUSCULAR; INTRAVENOUS at 10:32

## 2023-05-30 RX ADMIN — FENTANYL CITRATE 25 MCG: 50 INJECTION, SOLUTION INTRAMUSCULAR; INTRAVENOUS at 11:24

## 2023-05-30 RX ADMIN — Medication 100 MCG: at 10:39

## 2023-05-30 RX ADMIN — Medication 100 MCG: at 10:21

## 2023-05-30 RX ADMIN — PROMETHAZINE HYDROCHLORIDE 6.25 MG: 25 INJECTION INTRAMUSCULAR; INTRAVENOUS at 11:49

## 2023-05-30 RX ADMIN — VANCOMYCIN HYDROCHLORIDE 1000 MG: 1 INJECTION, SOLUTION INTRAVENOUS at 09:38

## 2023-05-30 RX ADMIN — SODIUM CHLORIDE, SODIUM LACTATE, POTASSIUM CHLORIDE, AND CALCIUM CHLORIDE: .6; .31; .03; .02 INJECTION, SOLUTION INTRAVENOUS at 09:38

## 2023-05-30 RX ADMIN — PROPOFOL 200 MG: 10 INJECTION, EMULSION INTRAVENOUS at 09:50

## 2023-05-30 RX ADMIN — FENTANYL CITRATE 50 MCG: 50 INJECTION, SOLUTION INTRAMUSCULAR; INTRAVENOUS at 09:44

## 2023-05-30 NOTE — ANESTHESIA PREPROCEDURE EVALUATION
Procedure:  RIGHT BREAST KRIS  DIRECTED LUMPECTOMY - KRIS BRACKETTED (Right: Breast)    Relevant Problems   ANESTHESIA   (+) PONV (postoperative nausea and vomiting)      CARDIO   (+) Essential hypertension   (+) Mixed hyperlipidemia   (+) PSVT (paroxysmal supraventricular tachycardia) (HCC)   (+) Paroxysmal atrial fibrillation (HCC)      ENDO   (+) Type 2 diabetes mellitus with hyperglycemia, without long-term current use of insulin (HCC)      GI/HEPATIC   (+) Gastroesophageal reflux disease      /RENAL   (+) Nephrolithiasis   (+) Stage 3 chronic kidney disease (HCC)      HEMATOLOGY   (+) Anemia      MUSCULOSKELETAL   (+) Arthritis      PULMONARY   (+) Acute on chronic respiratory failure with hypoxia and hypercapnia (HCC)   (+) Chronic respiratory failure with hypoxia and hypercapnia (HCC)   (+) Moderate persistent asthma with acute exacerbation   (+) KATRINA (obstructive sleep apnea)   (+) Reactive airway disease with acute exacerbation      Cardiovascular and Mediastinum   (+) Chronic diastolic heart failure (HCC)      Other   (+) Morbid obesity with BMI of 45 0-49 9, adult (HCC)     PONV (postoperative nausea and vomiting)  Asthma    GERD (gastroesophageal reflux disease)  Hyperlipidemia    SVT (supraventricular tachycardia) ([address] )  Anemia    COVID-19 January 2022 Hypertension    Chronic kidney disease  Diabetes mellitus ([address] )    SEBASTIAN (acute kidney injury) ([facility] )  Sleep apnea wear BIPAP   Atrial fibrillation ([facility] [facility] )              Left Ventricle: Left ventricular cavity size is normal  Wall thickness    is normal  Systolic function is normal  Wall motion is normal  Diastolic    function is normal for age  •  Mitral Valve: There is trace regurgitation  •  Pericardium: There is a trivial pericardial effusion  •  As compared to previous study of March 2023, no significant change  LVEF 50 to 55%  Mild concentric LVH   LVIDD 4 7cm  Normal RV size and systolic function  Trace MR  Estimated PA systolic pressure 39 mmHg     Pharm Nuc Stress 8/26/22: No ischemia  There is a left ventricular perfusion defect that is small in size with mild reduction in uptake present in the anterior and apex location(s) that is fixed  The defect appears to be an artifact caused by breast attenuation  LVEF 70%  Physical Exam    Airway    Mallampati score: III  TM Distance: >3 FB  Neck ROM: full     Dental       Cardiovascular  Cardiovascular exam normal    Pulmonary  Pulmonary exam normal     Other Findings        Anesthesia Plan  ASA Score- 3     Anesthesia Type- general with ASA Monitors  Additional Monitors:   Airway Plan: LMA  Plan Factors-Exercise tolerance (METS): >4 METS  Chart reviewed  EKG reviewed  Imaging results reviewed  Existing labs reviewed  Patient summary reviewed  Induction- intravenous  Postoperative Plan- Plan for postoperative opioid use  Planned trial extubation    Informed Consent- Anesthetic plan and risks discussed with patient  I personally reviewed this patient with the CRNA  Discussed and agreed on the Anesthesia Plan with the CRNA  Rebekah Leon

## 2023-05-30 NOTE — OP NOTE
OPERATIVE REPORT  PATIENT NAME: Piyush Berg    :  1950  MRN: [id number]  Pt Location: [facility] OR ROOM 01    SURGERY DATE: 2023    Surgeon(s) and Role:     * Pedro Culver MD - Primary     * Dinora Jett PA-C - Assisting    Preop Diagnosis:  Mass overlapping multiple quadrants of right breast [N63 15]    Post-Op Diagnosis Codes:     * Mass overlapping multiple quadrants of right breast [N63 15]    Procedure(s):  Right - RIGHT BREAST KRIS  DIRECTED LUMPECTOMY - KRIS BRACKETTED    Specimen(s):  ID Type Source Tests Collected by Time Destination   1 : Right Breast Ishmael Esters Directed Lumpectomy Suture Cox Short Superior Long Lateral Tissue Breast, Right TISSUE EXAM KHADIJAH Swain MD 2023 1016        Estimated Blood Loss:   Minimal    Drains:  * No LDAs found *    Anesthesia Type:   General/LMA    Operative Indications: Mass overlapping multiple quadrants of right breast [N63 15]      Operative Findings:  Both Kris clips in the specimen with biopsy clip  Complications:   None    Procedure and Technique:  I previously reviewed the post biopsy images  Lumpectomy  Piyush Berg was brought to the operation room and placed under general anesthesia  Attention was paid to ensure appropriate padding and correct positioning  The KRIS  detector was then used to locate the position of the KRIS deflector and the skin was marked in this area  The  right breast was prepped and draped in a sterile fashion  I initiated a time-out, identifying the patient, the correct side and the above procedure  All parties agreed with the time out  The planned incision was then injected with 0 25% Marcaine for local anesthesia  The incision was sharply incised  The KRIS dectector was used to guide the dissection  Superior, inferior, medial and lateral planes were developed around the KRIS deflector and the specimen truncated with electrocautery beyond the deflector    The specimen was then suturedfor orientation purposes  A specimen radiograph was taken in two dimensions  specimen wassent to pathology for permanent analysis  Superficial bleeding controlled with electrocautery and the wound was extensively irrigated  The wound was closed with two layers, an inner layer of 3-0 vicryl and a subcuticular layer of 4-0 monocryl  prineo were applied  The counts were correct x 2  I was present for the entire procedure  and A physician assistant was required during the procedure for retraction, tissue handling, dissection and suturing      Patient Disposition:  PACU     This procedure was not performed to treat breast cancer through sentinel node biopsy      SIGNATURE: Hawa Landry MD  DATE: May 30, 2023  TIME: 10:45 AM

## 2023-05-30 NOTE — INTERVAL H&P NOTE
H&P reviewed  After examining the patient I find no changes in the patients condition since the H&P had been written      Vitals:    05/30/23 0913   BP: (!) 185/74   Pulse: 79   Resp: 18   Temp: (!) 97 °F (36 1 °C)   SpO2: 96% Leno Slade (1952) 78 y.o. female here for evaluation of the following chief complaint(s):      HPI:  Chief Complaint   Patient presents with    Head Congestion    Fever    Cough    Generalized Body Aches    Shortness of Breath     With exertion    Headache       Onset of 1 day with symptoms as above. Sinus pressure. Chills. Denies CP, SOB or chest tightness. Easily winded with activity none at rest.      Vitals:    10/19/22 0928   BP: 126/82   Pulse: 84   Resp: 24   Temp: 99.5 °F (37.5 °C)       Patient Active Problem List   Diagnosis    CAD (coronary artery disease) 7/2014- mid lad 52 % patent LM< LCX and RCA    Hyperlipidemia LDL goal <70    Hypothyroid    History of total thyroidectomy    GERD (gastroesophageal reflux disease)    PUD (peptic ulcer disease)    IBS (irritable bowel syndrome)    IFG (impaired fasting glucose)    Vitamin D deficiency    SOB (shortness of breath) on exertion    Chest pain    Heart palpitations- intermittent    Abnormal nuclear stress test- anteroior and anteroseptal ischemia    Chest pain, atypical    Abdominal pain    Stress incontinence (female) (male)    Abnormal cardiovascular stress test    GOLDEN (dyspnea on exertion)    Arthritis of left hip    Degeneration of lumbosacral intervertebral disc    Pain, unspecified    Trochanteric bursitis of right hip    Unspecified infectious disease    Unilateral primary osteoarthritis, right hip       SUBJECTIVE/OBJECTIVE:  Review of Systems   Constitutional:  Positive for activity change, appetite change, chills, fatigue and fever. HENT:  Positive for congestion, postnasal drip, rhinorrhea and sinus pressure. Respiratory:  Positive for cough. Negative for shortness of breath. Cardiovascular:  Negative for chest pain. Gastrointestinal:  Negative for abdominal pain and nausea. Skin:  Negative for rash. Neurological:  Positive for headaches. Negative for dizziness and light-headedness. Psychiatric/Behavioral: Negative. Physical Exam  Constitutional:       Appearance: She is ill-appearing. HENT:      Head: Normocephalic. Right Ear: Tympanic membrane normal.      Left Ear: Tympanic membrane normal.      Nose: Mucosal edema, congestion and rhinorrhea present. Eyes:      Pupils: Pupils are equal, round, and reactive to light. Neck:      Vascular: No carotid bruit. Cardiovascular:      Rate and Rhythm: Normal rate and regular rhythm. Pulses: Normal pulses. Heart sounds: Normal heart sounds. Pulmonary:      Effort: Pulmonary effort is normal.      Breath sounds: Normal breath sounds. Abdominal:      General: Bowel sounds are normal.      Palpations: Abdomen is soft. Musculoskeletal:         General: Normal range of motion. Cervical back: Normal range of motion and neck supple. Skin:     General: Skin is warm and dry. Neurological:      Mental Status: She is alert and oriented to person, place, and time. ASSESSMENT/PLAN:   Diagnosis Orders   1. COVID-19  molnupiravir 200 MG capsule      2. Flu-like symptoms  POCT COVID-19 Rapid, NAAT            MDM: COVID - POS   Viral nature of symptoms discussed   Interaction  Xarelto and Paxlovid    - molnupiravir as directed  Symptomatic Care  Increase fluids and rest  RTO if symptoms worsen or stay the same        An electronic signature was used to authenticate this note.     --KELIN Ray - CNP

## 2023-05-30 NOTE — ANESTHESIA POSTPROCEDURE EVALUATION
Post-Op Assessment Note    CV Status:  Stable  Pain Score: 2    Pain management: adequate     Mental Status:  Alert and awake   Hydration Status:  Euvolemic   PONV Controlled:  Controlled   Airway Patency:  Patent      Post Op Vitals Reviewed: Yes      Staff: CRNA         No notable events documented      BP  98/67   Temp  97 9   Pulse 114   Resp 15   SpO2 100

## 2023-05-30 NOTE — INTERVAL H&P NOTE
H&P reviewed  After examining the patient I find no changes in the patients condition since the H&P had been written      Vitals:    05/30/23 0913   BP: (!) 185/74   Pulse: 79   Resp: 18   Temp: (!) 97 °F (36 1 °C)   SpO2: 96%

## 2023-05-31 ENCOUNTER — PATIENT OUTREACH (OUTPATIENT)
Dept: INTERNAL MEDICINE CLINIC | Facility: CLINIC | Age: 73
End: 2023-05-31

## 2023-05-31 ENCOUNTER — TELEPHONE (OUTPATIENT)
Dept: HEMATOLOGY ONCOLOGY | Facility: CLINIC | Age: 73
End: 2023-05-31

## 2023-05-31 NOTE — TELEPHONE ENCOUNTER
Appointment Change  Cancel, Reschedule, Change to Virtual      Who are you speaking with? Patient   If it is not the patient, are they listed on an active communication consent form? N/A   Which provider is the appointment scheduled with? Dr Gael Zhang   When is the appointment scheduled? Please list date and time  6/26/23 2pm   At which location is the appointment scheduled to take place? [facility]   Was the appointment rescheduled or changed from an in person visit to a virtual visit? If so, please list the details of the change  6/28/23 345   What is the reason for the appointment change? provider   Was [facility] transport scheduled for this visit? N/A   Does [facility] need to be scheduled for the new visit (if applicable) N/A   Does the patient need an infusion appointment rescheduled? N/A   Does the patient have an infusion appointment scheduled? If so, when? No   Is the patient undergoing chemotherapy? N/A   Was the no-show policy reviewed for appointments being changed with less then 24 hours of notice?  N/A

## 2023-05-31 NOTE — PROGRESS NOTES
ADT Alert received via IB  Patient had OP surgery yesterday on a mass under her breast      Chart was reviewed and this RNCM called patient for follow up  Patient states she is doing okay  She says she is very tired since the surgery and is still having some pain  She states the pain under her breast is a 9/10  She has been taking Tylenol #5 every 6 hours for the pain and it comes down to a 5/10 which is acceptable  Patient states that her BS has been running high since the surgery  This morning her BS was 300  After lunch she says it came down to a little over 200  She states she is tracking it and will keep an eye on it  She states she is taking her medications as prescribed  She states she is adhering to a low carb diet but they did have a picnic Monday  Today she had an egg with toast and pasta salad for lunch  I told her that the diet she had today is not helping her BS's come down  I advised her not to eat carbs at every meal  She states she knows  We discussed goals she would like to work on and she states she does not feel she needs to work on any goals at this time and declines the need for follow up  She states that she goes to a diabetic doctor and they help her  Patient states she did get the new Cpap with the new settings and it is working fine  Patient declines CCM services and further follow up at this time      CCM episode was closed and this RNCM removed self from care team

## 2023-05-31 NOTE — TELEPHONE ENCOUNTER
Patient Call    Who are you speaking with? self   If it is not the patient, are they listed on an active communication consent form? self   What is the reason for this call? Does Dr Samantha Rosales need to see patient so soon, surgery was just yesterday 5/30? Does this require a call back? yes   If a call back is required, please list best call back number [phone]   If a call back is required, advise that a message will be forwarded to their care team and someone will return their call as soon as possible  Did you relay this information to the patient?  yes

## 2023-05-31 NOTE — TELEPHONE ENCOUNTER
Appointment Confirmation   Who are you speaking with? Patient   If it is not the patient, are they listed on an active communication consent form? N/A   Which provider is the appointment scheduled with? Dr Rayshawn Modi   When is the appointment scheduled? Please list date and time 6/26/23 2pm   At which location is the appointment scheduled to take place? [facility]   Did caller verbalize understanding of appointment details?  Yes

## 2023-06-01 ENCOUNTER — TELEPHONE (OUTPATIENT)
Dept: HEMATOLOGY ONCOLOGY | Facility: CLINIC | Age: 73
End: 2023-06-01

## 2023-06-01 NOTE — TELEPHONE ENCOUNTER
Patient Call    Who are you speaking with? Patient    If it is not the patient, are they listed on an active communication consent form? N/A   What is the reason for this call? Patient calling in stating she is unable to make her hospital follow up with Dr Mauricio Goldsmith today due to having no ride  The patient states the next available of August may be too long to wait  Does this require a call back? Yes   If a call back is required, please list best call back number [phone]   If a call back is required, advise that a message will be forwarded to their care team and someone will return their call as soon as possible  Did you relay this information to the patient?  Yes

## 2023-06-02 ENCOUNTER — TELEPHONE (OUTPATIENT)
Dept: HEMATOLOGY ONCOLOGY | Facility: CLINIC | Age: 73
End: 2023-06-02

## 2023-06-02 NOTE — TELEPHONE ENCOUNTER
I spoke with the patient in regards to rescheduling her missed appt yesterday  Informed patient I can schedule her for an appt at the [address] [facility] office on 6/8/23 at 3:00pm  Patient a little concerned due to her daughter needing to  the kids  Informed patient that she can come into the office a little sooner than her appt and wait in the waiting area  Patient states understanding and accepted appt

## 2023-06-07 PROCEDURE — 88342 IMHCHEM/IMCYTCHM 1ST ANTB: CPT | Performed by: PATHOLOGY

## 2023-06-07 PROCEDURE — 88341 IMHCHEM/IMCYTCHM EA ADD ANTB: CPT | Performed by: PATHOLOGY

## 2023-06-07 PROCEDURE — 88307 TISSUE EXAM BY PATHOLOGIST: CPT | Performed by: PATHOLOGY

## 2023-06-08 ENCOUNTER — OFFICE VISIT (OUTPATIENT)
Dept: HEMATOLOGY ONCOLOGY | Facility: MEDICAL CENTER | Age: 73
End: 2023-06-08
Payer: MEDICARE

## 2023-06-08 VITALS
DIASTOLIC BLOOD PRESSURE: 60 MMHG | WEIGHT: 263 LBS | RESPIRATION RATE: 20 BRPM | HEART RATE: 60 BPM | TEMPERATURE: 99.4 F | HEIGHT: 62 IN | SYSTOLIC BLOOD PRESSURE: 124 MMHG | BODY MASS INDEX: 48.4 KG/M2 | OXYGEN SATURATION: 98 %

## 2023-06-08 DIAGNOSIS — E66.01 MORBID OBESITY WITH BMI OF 45.0-49.9, ADULT (HCC): Chronic | ICD-10-CM

## 2023-06-08 DIAGNOSIS — D05.11 DUCTAL CARCINOMA IN SITU (DCIS) OF RIGHT BREAST: ICD-10-CM

## 2023-06-08 DIAGNOSIS — J96.12 CHRONIC RESPIRATORY FAILURE WITH HYPOXIA AND HYPERCAPNIA (HCC): Primary | Chronic | ICD-10-CM

## 2023-06-08 DIAGNOSIS — J96.11 CHRONIC RESPIRATORY FAILURE WITH HYPOXIA AND HYPERCAPNIA (HCC): Primary | Chronic | ICD-10-CM

## 2023-06-08 DIAGNOSIS — D24.1 INTRADUCTAL PAPILLOMA OF RIGHT BREAST: ICD-10-CM

## 2023-06-08 PROBLEM — D05.10 DCIS (DUCTAL CARCINOMA IN SITU): Status: ACTIVE | Noted: 2023-06-08

## 2023-06-08 PROCEDURE — 99215 OFFICE O/P EST HI 40 MIN: CPT | Performed by: INTERNAL MEDICINE

## 2023-06-08 NOTE — PROGRESS NOTES
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  Mrs Ivon Bazzi feels well, clinically there are no concerning findings  Patient will follow-up with Dr Julito Foley as directed  Patient denies any family history of breast or ovarian cancer  NCCN guidelines 4 2023 states that for patients with DCIS status post BCS, adjuvant treatments include whole breast irradiation +/- boost   Radiation oncology has been consulted  Additional risk reduction therapy includes endocrine therapy for 5 years if the patient is ER positive  Estrogen receptor results are still pending  Once these results are available, patient will be contacted  We discussed the possibility of 5 years of adjuvant hormonal manipulation  Mrs Ivon Bazzi states that she would be willing to be treated with tamoxifen/Arimidex if indicated  We discussed potential side effects and toxicities, we discussed the role of these medications - to decrease her risk for recurrence  Patient will have an official nursing teach once the ER status is available (if indicated)  Patient is to return in 7 to 8 weeks but this may change depending upon the above  Mrs Ivon Bazzi  knows to call the hematology/oncology office if there are any other questions or concerns  Carefully review your medication list and verify that the list is accurate and up-to-date  Please call the hematology/oncology office if there are medications missing from the list, medications on the list that you are not currently taking or if there is a dosage or instruction that is different from how you're taking that medication      Patient goals and areas of care: Radiation oncology evaluation, check ER/MS status  Barriers to care: None  Patient is able to self-care  _____________________________________________________________________________________    Chief Complaint   Patient presents with   • Follow-up   • Diagnosis of DCIS     History of Present Illness: 82-year-old female admitted to [facility] with respiratory issues  Patient has a history of obesity, KATRINA, diabetes, hypertension, pulmonary nodules  Hematology/oncology consult was performed on March 27, 2023  A recent mammogram demonstrated a right-sided abnormality  Eventual biopsy demonstrated atypical cells but no clear diagnosis of a malignancy  Patient was subsequently seen/evaluated by Dr Aj Johnson, surgery and underwent right-sided lumpectomy  Pathology results (listed below) demonstrated ductal carcinoma in situ, few foci, closest margin was 5 mm, background intraductal papilloma and noninvasive lobular neoplasia (ALH)  ER and [place] are in process  Presently Mrs Félix Couch states feeling feeling okay, closer to baseline  No respiratory issues  No recent hospitalizations  Patient has healed well from the surgery, no breast issues, no pain control issues  Activities are limited but the same as before  Review of Systems   Constitutional: Positive for fatigue  HENT: Negative  Eyes: Negative  Respiratory: Negative  Cardiovascular: Negative  Gastrointestinal: Negative  Endocrine: Negative  Genitourinary: Negative  Musculoskeletal: Negative  Skin: Negative  Allergic/Immunologic: Negative  Neurological: Negative  Hematological: Negative  Psychiatric/Behavioral: Negative  All other systems reviewed and are negative      Patient Active Problem List   Diagnosis   • Moderate persistent asthma with acute exacerbation   • Morbid obesity with BMI of 45 0-49 9, adult (HCC)   • Lower leg edema   • Paroxysmal atrial fibrillation (HCC)   • Mixed hyperlipidemia   • Anemia   • Essential hypertension   • PONV (postoperative nausea and vomiting)   • Gastroesophageal reflux disease   • Nephrolithiasis   • Arthritis   • S/P total knee replacement, right   • On continuous oral anticoagulation - eliquis   • Reactive airway disease with acute exacerbation   • Status post reverse total replacement of left shoulder   • Stage 3 chronic kidney disease ([facility])   • Peripheral venous insufficiency   • Post-herpetic polyneuropathy   • Raynaud's disease   • Lung nodule   • Chronic diastolic heart failure ([facility])   • Multiple pulmonary nodules determined by computed tomography of lung   • Mass overlapping multiple quadrants of right breast   • Pressure injury of deep tissue of sacral region   • Acute on chronic respiratory failure with hypoxia and hypercapnia ([facility])   • Obesity hypoventilation syndrome ([facility])   • KATRINA (obstructive sleep apnea)   • Elevated serum creatinine   • Chronic respiratory failure with hypoxia and hypercapnia ([facility])   • Platelets decreased ([facility])   • PSVT (paroxysmal supraventricular tachycardia) ([facility])   • Rash   • Impaired mobility and ADLs   • Pressure injury of sacral region, stage 2 ([facility])   • Blister of right heel   • Heel blister   • Fall   • Syncope   • Type 2 diabetes mellitus with hyperglycemia, without long-term current use of insulin ([facility])   • Abnormal CT scan, chest   • Allergies   • Abnormal mammogram   • Intraductal papilloma of right breast   • DCIS (ductal carcinoma in situ)     Past Medical History:   Diagnosis Date   • SEBASTIAN (acute kidney injury) ([facility] ) 01/23/2023   • Anemia    • Asthma    • Atrial fibrillation ([facility])    • Chronic kidney disease    • COVID-19 January 2022   • Diabetes mellitus ([facility] )    • GERD (gastroesophageal reflux disease)    • Hyperlipidemia    • Hypertension    • PONV (postoperative nausea and vomiting)    • Sleep apnea     wear BIPAP   • SVT (supraventricular tachycardia) ([facility] )      Past Surgical History:   Procedure Laterality Date   • APPENDECTOMY     • BREAST BIOPSY Right     years ago when she was 23   • BREAST BIOPSY Right 03/13/2023 • BREAST LUMPECTOMY Right 2023    Procedure: RIGHT BREAST KRIS  DIRECTED LUMPECTOMY - Conchita Tillman;  Surgeon: Tita Nieves MD;  Location: [facility];  Service: Surgical Oncology   • CYSTOSCOPY W/ LASER LITHOTRIPSY Left 2016    Procedure: CYSTOSCOPY URETEROSCOPY WITH LITHOTRIPSY HOLMIUM LASER, RETROGRADE PYELOGRAM AND INSERTION STENT URETERAL;  Surgeon: Boom Martinez MD;  Location: [address] [place] [address];  Service:    • DILATION AND CURETTAGE OF UTERUS     • IR THORACENTESIS  2022   • JOINT REPLACEMENT      right knee   • KNEE ARTHROPLASTY Right    • MAMMO NEEDLE LOCALIZATION LEFT (ALL INC) EACH ADD Right 2023   • MAMMO STEREOTACTIC BREAST BIOPSY RIGHT (ALL INC) Right 2023    High Risk Lesion   • MO ARTHROPLASTY GLENOHUMERAL JOINT TOTAL SHOULDER Left 2022    Procedure: ARTHROPLASTY SHOULDER REVERSE;  Surgeon: Anthony Sandy MD;  Location: [facility];  Service: Orthopedics   • MO CYSTO BLADDER W/URETERAL CATHETERIZATION Left 2016    Procedure: CYSTOSCOPY RETROGRADE PYELOGRAM WITH INSERTION STENT URETERAL, left;  Surgeon: Boom Martinez MD;  Location: [facility];  Service: Urology   • SHOULDER ARTHROTOMY Left      Family History   Problem Relation Age of Onset   • Heart disease Mother    • Diabetes Father    • Thyroid cancer Sister    • Heart disease Brother    • Diabetes Brother    • Heart disease Brother    • Heart disease Brother    • Emphysema Maternal Grandmother    • Heart disease Family      Social History     Socioeconomic History   • Marital status: Single     Spouse name: Not on file   • Number of children: 0   • Years of education: 15   • Highest education level: Associate degree: academic program   Occupational History   • Not on file   Tobacco Use   • Smoking status: Former     Packs/day: 1 00     Years: 20 00     Total pack years: 20 00     Types: Cigarettes     Quit date: 1996     Years since quittin 9   • Smokeless tobacco: Never   Vaping Use   • Vaping Use: Never used   Substance and Sexual Activity   • Alcohol use: Not Currently     Comment: rarely   • Drug use: Never   • Sexual activity: Not Currently   Other Topics Concern   • Not on file   Social History Narrative    ** Merged History Encounter **          Social Determinants of Health     Financial Resource Strain: Not on file   Food Insecurity: No Food Insecurity (3/28/2023)    Hunger Vital Sign    • Worried About Running Out of Food in the Last Year: Never true    • Ran Out of Food in the Last Year: Never true   Transportation Needs: No Transportation Needs (3/28/2023)    PRAPARE - Transportation    • Lack of Transportation (Medical): No    • Lack of Transportation (Non-Medical):  No   Physical Activity: Not on file   Stress: Not on file   Social Connections: Not on file   Intimate Partner Violence: Not on file   Housing Stability: Low Risk  (3/28/2023)    Housing Stability Vital Sign    • Unable to Pay for Housing in the Last Year: No    • Number of Places Lived in the Last Year: 2    • Unstable Housing in the Last Year: No       Current Outpatient Medications:   •  albuterol (PROVENTIL HFA,VENTOLIN HFA) 90 mcg/act inhaler, Inhale 2 puffs every 6 (six) hours as needed for wheezing, Disp: , Rfl:   •  ammonium lactate (LAC-HYDRIN) 12 % lotion, APPLY TOPICALLY TO AFFECTED AREAS twice a day, Disp: , Rfl:   •  apixaban (ELIQUIS) 5 mg, Take 5 mg by mouth 2 (two) times a day, Disp: , Rfl:   •  fluticasone (FLONASE) 50 mcg/act nasal spray, 1 spray into each nostril daily, Disp: , Rfl: 0  •  Fluticasone-Salmeterol (Wixela Inhub) 250-50 mcg/dose inhaler, Inhale 1 puff 2 (two) times a day Rinse mouth after use , Disp: 60 blister, Rfl: 3  •  glucose blood (OneTouch Verio) test strip, Use 1 each 4 (four) times a day Use as instructed, Disp: 400 strip, Rfl: 1  •  insulin glargine (Toujeo SoloStar) 300 units/mL CONCENTRATED U-300 injection pen (1-unit dial), Inject 25 Units under the skin every 12 (twelve) hours, Disp: , Rfl:   •  insulin lispro (HumaLOG KwikPen) 100 units/mL injection pen, Use 10 units prior to each meal +1 unit per 50 above 150 mg/dL, Disp: 15 mL, Rfl: 2  •  Insulin Pen Needle (BD Pen Needle Pilar 2nd Gen) 32G X 4 MM MISC, For use with insulin pen   Pharmacy may dispense brand covered by insurance , Disp: 100 each, Rfl: 0  •  Insulin Pen Needle (NovoFine Autocover) 30G X 8 MM MISC, Inject under the skin daily, Disp: 100 each, Rfl: 3  •  Insulin Pen Needle 30G X 8 MM MISC, 2 (two) times a day, Disp: , Rfl:   •  Insulin Pen Needle 31G X 8 MM MISC, Use daily Inject under the skin, Disp: 100 each, Rfl: 2  •  Lancets (onetouch ultrasoft) lancets, Use once daily, Disp: 100 each, Rfl: 2  •  metoprolol succinate (TOPROL-XL) 50 mg 24 hr tablet, Take 1 tablet (50 mg total) by mouth 2 (two) times a day, Disp: 60 tablet, Rfl: 1  •  montelukast (SINGULAIR) 10 mg tablet, Take 1 tablet (10 mg total) by mouth daily at bedtime, Disp: 30 tablet, Rfl: 1  •  NovoFine Autocover Pen Needle 30G X 8 MM MISC, USE TWICE A DAY, Disp: 300 each, Rfl: 5  •  nystatin (MYCOSTATIN) powder, Apply topically 2 (two) times a day, Disp: 15 g, Rfl: 0  •  omeprazole (PriLOSEC) 20 mg delayed release capsule, Take 1 capsule (20 mg total) by mouth daily before breakfast, Disp: 30 capsule, Rfl: 0  •  pregabalin (LYRICA) 100 mg capsule, take 1 capsule by mouth twice a day, Disp: 180 capsule, Rfl: 1  •  rosuvastatin (CRESTOR) 10 MG tablet, take 1 tablet by mouth once daily, Disp: 90 tablet, Rfl: 1  •  semaglutide, 1 mg/dose, (Ozempic, 1 MG/DOSE,) 4 mg/3 mL injection pen, Inject 0 75 mL (1 mg total) under the skin every 7 days, Disp: 9 mL, Rfl: 1  •  torsemide (DEMADEX) 20 mg tablet, TAKE 2 TABLETS BY MOUTH IN THE A M , Disp: 180 tablet, Rfl: 1    Allergies   Allergen Reactions   • Penicillins Hives   • Moxifloxacin Other (See Comments)     unknown   • Oxycodone-Acetaminophen Other (See Comments)     GI upset   • Zinc Acetate "Other (See Comments)     unknown   • Penicillins Hives   • Asa [Aspirin] GI Intolerance   • Indocin [Indomethacin] Other (See Comments)     Made patient \"loopy\"   • Other Other (See Comments)     unknown   • Tannic Acid Rash       Vitals:    06/08/23 1504   BP: 124/60   Pulse: 60   Resp: 20   Temp: 99 4 °F (37 4 °C)   SpO2: 98%     Physical Exam  Constitutional:       Appearance: She is well-developed  Comments: Obese female, no respiratory distress, no signs of pain   HENT:      Head: Normocephalic and atraumatic  Right Ear: External ear normal       Left Ear: External ear normal    Eyes:      Conjunctiva/sclera: Conjunctivae normal       Pupils: Pupils are equal, round, and reactive to light  Cardiovascular:      Rate and Rhythm: Normal rate and regular rhythm  Heart sounds: Normal heart sounds  Pulmonary:      Effort: Pulmonary effort is normal       Breath sounds: Normal breath sounds  Comments: Distant breath sounds bilaterally  Abdominal:      General: Bowel sounds are normal       Palpations: Abdomen is soft  Comments: + Bowel sounds, nontender, soft, obese cannot palpate liver or spleen, no guarding   Musculoskeletal:         General: Normal range of motion  Cervical back: Normal range of motion and neck supple  Skin:     General: Skin is warm  Neurological:      Mental Status: She is alert and oriented to person, place, and time  Deep Tendon Reflexes: Reflexes are normal and symmetric  Psychiatric:         Behavior: Behavior normal          Thought Content:  Thought content normal          Judgment: Judgment normal      Breasts: Deferred, no axillary adenopathy bilaterally   Extremities: 0-1+ bilateral lower extremity edema, no cords, pulses are 1+  Lymphatics: No adenopathy in the neck, supraclavicular region, axilla bilaterally    Performance Status: 0 - Asymptomatic    Labs    4/29/2023 BUN = 37 creatinine = 1 67 glucose = 288 calcium = 8 1    4/28/2023 WBC = " 9 7 hemoglobin = 11 6 hematocrit = 35 5 platelet = 949 neutrophil = 58%    Imaging     3/26/2023 CAT scan chest abdomen pelvis without     CHEST     LUNGS:  Multiple nodules, unchanged since 3/9/2022:  5 mm anterior right apical nodule, image 6/23   5 mm left lower lobe superior segment nodule, image 6/40   3 mm posterior lateral right upper lobe nodule, image 6/47   6 mm anterior right lower lobe nodule, image 6/60      No acute infiltrates   There is no tracheal or endobronchial lesion      PLEURA:  Unremarkable     IMPRESSION:     No evidence of malignancy or source for sepsis identified in the chest, abdomen or pelvis  Stable pulmonary nodules  Hepatomegaly  Colonic diverticulosis     3/9/2023 mammogram diagnostic right  Suspicious microcalcification for which stereotactic core biopsy sampling is recommended, category 4      1/12/2023 mammogram screening bilateral   Impression stated that additional imaging required, right was category 0, incomplete, needs additional evaluation      Pathology    Case Report   Surgical Pathology Report                         Case: [id number]                                    Authorizing Provider: Sherlyn Mccord,  Collected:           05/30/2023 1016                                      MD                                                                            Ordering Location:     [facility] Received:            05/30/2023 1049                                      Operating Room                                                                Pathologist:           Massiel Wilson MD                                                                 Specimen:    Breast, Right, Right Breast Evelyn Directed Lumpectomy Suture Cox Short Superior Long                Lateral                                                                                    Final Diagnosis   A    Right breast (lumpectomy):     - Ductal carcinoma in-situ (DCIS), grade 1-2 of 3      - Few foci of DCIS present, with the largest contiguous focus measuring 6 mm  - Closest margin: medial (approximately 5 mm from DCIS)  - Background intraductal papilloma  See comment       - Non-invasive lobular neoplasia ([place])     Comment:  - In addition to DCIS, intraductal papilloma with at least ADH is present (partial involvement by DCIS not excluded)  - ER / CO pending  Electronically signed by Rayshawn Acosta MD on 6/7/2023 at 12:51 PM   Microscopic Description    - Immunohistochemical studies (with appropriate controls) demonstrate:     DCIS:  Membranous E-cadherin and P120 catenin  P63 and SMM-HC present  CK5/6 peripheral       ALH:  Absent e-cadherin         Case Report   Surgical Pathology Report                         Case: [id number]                                    Authorizing Provider: Cecilio Keller MD  Collected:           03/13/2023 1055               Ordering Location:     [address] [place] [address] Received:            03/13/2023 1153                                      Mammography                                                                   Pathologist:           Roxana Wilson MD                                                        Specimen:    Breast, Right, right breast calcs  9:00                                                     Final Diagnosis   A  Breast, Right, right breast calcs  9:00:  -Fragments of Atypical intraductal papillary proliferation with central hyalinized stromal core and associated calcification      Comment  The differential diagnosis includes  ductal carcinoma in situ /DCIS arising in the background of intraductal papilloma vs  intraductal papilloma with atypical ductal hyperplasia   Further characterization will be performed on the resection specimen       Note   stain highlights  intraductal epithelial proliferation while CK5/6 shows patchy staining    Calponin stain highlights myoepithelial layer surrounding the lesion   Controls reacted appropriately    Electronically signed by Asia Bellamy MD on 3/15/2023 at 10:56 AM

## 2023-06-12 ENCOUNTER — DOCUMENTATION (OUTPATIENT)
Dept: HEMATOLOGY ONCOLOGY | Facility: CLINIC | Age: 73
End: 2023-06-12

## 2023-06-12 NOTE — PROGRESS NOTES
I contacted Radiation Oncology to schedule appointment for [facility] and they said that they will contact the patient directly to schedule

## 2023-06-13 PROBLEM — J45.40 MODERATE PERSISTENT ASTHMA WITHOUT COMPLICATION: Status: ACTIVE | Noted: 2018-03-08

## 2023-06-13 PROBLEM — R93.89 ABNORMAL CT SCAN, CHEST: Status: RESOLVED | Noted: 2023-03-28 | Resolved: 2023-06-13

## 2023-06-13 PROBLEM — J96.22 ACUTE ON CHRONIC RESPIRATORY FAILURE WITH HYPOXIA AND HYPERCAPNIA (HCC): Status: RESOLVED | Noted: 2022-05-16 | Resolved: 2023-06-13

## 2023-06-13 PROBLEM — R06.00 DYSPNEA: Status: RESOLVED | Noted: 2022-03-09 | Resolved: 2023-06-13

## 2023-06-13 PROBLEM — J96.21 ACUTE ON CHRONIC RESPIRATORY FAILURE WITH HYPOXIA AND HYPERCAPNIA (HCC): Status: RESOLVED | Noted: 2022-05-16 | Resolved: 2023-06-13

## 2023-06-13 NOTE — PROGRESS NOTES
Pulmonary Follow-Up Note   Mohit Marsh 81 y o  female MRN: [id number]  6/14/2023      Assessment/Plan:    Problem List Items Addressed This Visit        Respiratory    Chronic respiratory failure with hypoxia and hypercapnia (HCC) - Primary (Chronic)     She will continue with supplemental oxygen via CPAP at night and as needed during the day  Can consider overnight pulse ox at next visit  Moderate persistent asthma without complication     She will continue with Wixela and albuterol via nebulizer as needed  If she does not have improvement in her symptoms using this, she can return call to the office and we can consider steroid course  She does not have wheezing today  Multiple pulmonary nodules determined by computed tomography of lung     Repeat CT chest in April 2024 or sooner if oncology recommends  KATRINA (obstructive sleep apnea)     She is doing well with CPAP and will continue nightly use  She is deriving benefit  Her machine is auto set from 4-7 cmH2O and she is using a median pressure of 4 3 with a maximum pressure of 6 1  Her AHI is 1 2  She has resupply with her equipment company and she is aware of proper use and maintenance of her machine  Other    Morbid obesity with BMI of 45 0-49 9, adult (HCC) (Chronic)     Lifestyle modifications and weight loss as able  Obesity hypoventilation syndrome ([address] )     Further plans as listed above  CPAP nightly  DCIS (ductal carcinoma in situ)     She will continue to follow with oncology  She reports that the plan is for at least radiation and she is pending further biopsy results  Return in about 6 months (around 12/14/2023), or if symptoms worsen or fail to improve  All of Ann's questions were answered prior to leaving the office today  She will follow-up with Dr Alanis Lawrence in three months or sooner should the need arise   She is aware to call our office with any further questions or "concerns  History of Present Illness   Reason for Visit: Hospital follow-up  Chief Complaint: \"I am a little short of breath  \"  HPI: Juan Manuel Fuentes is a 81 y o  female who presents to the office today for follow-up  Overall, Kodi Henson reports she is doing well since she left the hospital   She is slightly short of breath today, but attributes this to the weather  She plans to use her nebulizer when she gets home and typically has improvement in her symptoms  She denies any cough or sputum production  She denies any chest pain  She underwent surgery for her breast nodule and was found to be cancer  She knows that she needs radiation but she is awaiting further results to determine if she needs systemic therapy  She also has sleep apnea and wears CPAP nightly  She does well with this and is having no concerns  She is working with the company to get new supplies  Review of Systems   All other systems reviewed and are negative  A full 12-point review of systems was completed and is negative except for those outlined in the HPI      Historical Information   Past Medical History:   Diagnosis Date   • SEBASTIAN (acute kidney injury) ([facility] ) 01/23/2023   • Anemia    • Asthma    • Atrial fibrillation ([facility] )    • Chronic kidney disease    • COVID-19 January 2022   • Diabetes mellitus ([facility] )    • GERD (gastroesophageal reflux disease)    • Hyperlipidemia    • Hypertension    • PONV (postoperative nausea and vomiting)    • Sleep apnea     wear BIPAP   • SVT (supraventricular tachycardia) ([facility] [address] )      Past Surgical History:   Procedure Laterality Date   • APPENDECTOMY     • BREAST BIOPSY Right     years ago when she was 23   • BREAST BIOPSY Right 03/13/2023   • BREAST LUMPECTOMY Right 5/30/2023    Procedure: RIGHT BREAST KRIS  DIRECTED LUMPECTOMY - Leann Magana;  Surgeon: Krystle Redd MD;  Location: [facility];  Service: Surgical Oncology   • CYSTOSCOPY W/ LASER LITHOTRIPSY Left 07/12/2016    Procedure: " CYSTOSCOPY URETEROSCOPY WITH LITHOTRIPSY HOLMIUM LASER, RETROGRADE PYELOGRAM AND INSERTION STENT URETERAL;  Surgeon: Boom Martinez MD;  Location: [address] [place] [address];  Service:    • DILATION AND CURETTAGE OF UTERUS     • IR THORACENTESIS  2022   • JOINT REPLACEMENT      right knee   • KNEE ARTHROPLASTY Right    • MAMMO NEEDLE LOCALIZATION LEFT (ALL INC) EACH ADD Right 2023   • MAMMO STEREOTACTIC BREAST BIOPSY RIGHT (ALL INC) Right 2023    High Risk Lesion   • IA ARTHROPLASTY GLENOHUMERAL JOINT TOTAL SHOULDER Left 2022    Procedure: ARTHROPLASTY SHOULDER REVERSE;  Surgeon: Anthony Sandy MD;  Location: [facility];  Service: Orthopedics   • IA CYSTO BLADDER W/URETERAL CATHETERIZATION Left 2016    Procedure: CYSTOSCOPY RETROGRADE PYELOGRAM WITH INSERTION STENT URETERAL, left;  Surgeon: Boom Martinez MD;  Location: [facility];  Service: Urology   • SHOULDER ARTHROTOMY Left      Family History   Problem Relation Age of Onset   • Heart disease Mother    • Diabetes Father    • Thyroid cancer Sister    • Heart disease Brother    • Diabetes Brother    • Heart disease Brother    • Heart disease Brother    • Emphysema Maternal Grandmother    • Heart disease Family      Social History   Social History     Substance and Sexual Activity   Alcohol Use Not Currently    Comment: rarely     Social History     Substance and Sexual Activity   Drug Use Never     Social History     Tobacco Use   Smoking Status Former   • Packs/day: 1 00   • Years: 20 00   • Total pack years: 20 00   • Types: Cigarettes   • Quit date: 1996   • Years since quittin 9   Smokeless Tobacco Never     E-Cigarette/Vaping   • E-Cigarette Use Never User      E-Cigarette/Vaping Substances   • Nicotine No    • THC No    • CBD No    • Flavoring No    • Other No    • Unknown No        Meds/Allergies     Current Outpatient Medications:   •  albuterol (PROVENTIL HFA,VENTOLIN HFA) 90 mcg/act inhaler, Inhale 2 puffs every 6 (six) hours as needed for wheezing, Disp: , Rfl:   •  ammonium lactate (LAC-HYDRIN) 12 % lotion, APPLY TOPICALLY TO AFFECTED AREAS twice a day, Disp: , Rfl:   •  apixaban (ELIQUIS) 5 mg, Take 5 mg by mouth 2 (two) times a day, Disp: , Rfl:   •  fluticasone (FLONASE) 50 mcg/act nasal spray, 1 spray into each nostril daily, Disp: , Rfl: 0  •  Fluticasone-Salmeterol (Wixela Inhub) 250-50 mcg/dose inhaler, Inhale 1 puff 2 (two) times a day Rinse mouth after use , Disp: 60 blister, Rfl: 3  •  glucose blood (OneTouch Verio) test strip, Use 1 each 4 (four) times a day Use as instructed, Disp: 400 strip, Rfl: 1  •  insulin glargine (Toujeo SoloStar) 300 units/mL CONCENTRATED U-300 injection pen (1-unit dial), Inject 25 Units under the skin every 12 (twelve) hours, Disp: , Rfl:   •  insulin lispro (HumaLOG KwikPen) 100 units/mL injection pen, Use 10 units prior to each meal +1 unit per 50 above 150 mg/dL, Disp: 15 mL, Rfl: 2  •  Insulin Pen Needle (BD Pen Needle Pilar 2nd Gen) 32G X 4 MM MISC, For use with insulin pen   Pharmacy may dispense brand covered by insurance , Disp: 100 each, Rfl: 0  •  Insulin Pen Needle (NovoFine Autocover) 30G X 8 MM MISC, Inject under the skin daily, Disp: 100 each, Rfl: 3  •  Insulin Pen Needle 30G X 8 MM MISC, 2 (two) times a day, Disp: , Rfl:   •  Lancets (onetouch ultrasoft) lancets, Use once daily, Disp: 100 each, Rfl: 2  •  metoprolol succinate (TOPROL-XL) 50 mg 24 hr tablet, Take 1 tablet (50 mg total) by mouth 2 (two) times a day, Disp: 60 tablet, Rfl: 1  •  montelukast (SINGULAIR) 10 mg tablet, Take 1 tablet (10 mg total) by mouth daily at bedtime, Disp: 30 tablet, Rfl: 1  •  NovoFine Autocover Pen Needle 30G X 8 MM MISC, USE TWICE A DAY, Disp: 300 each, Rfl: 5  •  nystatin (MYCOSTATIN) powder, Apply topically 2 (two) times a day, Disp: 15 g, Rfl: 0  •  omeprazole (PriLOSEC) 20 mg delayed release capsule, Take 1 capsule (20 mg total) by mouth daily before breakfast, Disp: 30 capsule, "Rfl: 0  •  pregabalin (LYRICA) 100 mg capsule, take 1 capsule by mouth twice a day, Disp: 180 capsule, Rfl: 1  •  rosuvastatin (CRESTOR) 10 MG tablet, take 1 tablet by mouth once daily, Disp: 90 tablet, Rfl: 1  •  semaglutide, 1 mg/dose, (Ozempic, 1 MG/DOSE,) 4 mg/3 mL injection pen, Inject 0 75 mL (1 mg total) under the skin every 7 days, Disp: 9 mL, Rfl: 1  •  torsemide (DEMADEX) 20 mg tablet, TAKE 2 TABLETS BY MOUTH IN THE A M , Disp: 180 tablet, Rfl: 1  •  Insulin Pen Needle 31G X 8 MM MISC, Use daily Inject under the skin, Disp: 100 each, Rfl: 2  Allergies   Allergen Reactions   • Penicillins Hives   • Moxifloxacin Other (See Comments)     unknown   • Oxycodone-Acetaminophen Other (See Comments)     GI upset   • Zinc Acetate Other (See Comments)     unknown   • Penicillins Hives   • Asa [Aspirin] GI Intolerance   • Indocin [Indomethacin] Other (See Comments)     Made patient \"loopy\"   • Other Other (See Comments)     unknown   • Tannic Acid Rash       Vitals: Blood pressure 160/68, resp  rate 12, height 5' 2\" (1 575 m), weight 119 kg (263 lb), SpO2 92 %, not currently breastfeeding  Body mass index is 48 1 kg/m²  Oxygen Therapy  SpO2: 92 %    Physical Exam:  Physical Exam  Vitals reviewed  Constitutional:       General: She is not in acute distress  Appearance: She is well-developed  She is morbidly obese  She is not toxic-appearing or diaphoretic  HENT:      Head: Normocephalic and atraumatic  Eyes:      General: No scleral icterus  Neck:      Trachea: No tracheal deviation  Cardiovascular:      Rate and Rhythm: Normal rate and regular rhythm  Heart sounds: S1 normal and S2 normal  No murmur heard  No friction rub  No gallop  Pulmonary:      Effort: Pulmonary effort is normal  No tachypnea, accessory muscle usage or respiratory distress  Breath sounds: Normal breath sounds  No stridor  No decreased breath sounds, wheezing, rhonchi or rales  Chest:      Chest wall: No tenderness   " "  Abdominal:      General: Bowel sounds are normal  There is no distension  Palpations: Abdomen is soft  Tenderness: There is no abdominal tenderness  Musculoskeletal:      Cervical back: Neck supple  Right lower leg: No edema  Left lower leg: No edema  Skin:     General: Skin is warm and dry  Findings: No rash  Neurological:      Mental Status: She is alert and oriented to person, place, and time  GCS: GCS eye subscore is 4  GCS verbal subscore is 5  GCS motor subscore is 6  Psychiatric:         Speech: Speech normal          Behavior: Behavior normal  Behavior is cooperative  No new labs or diagnostic testing      RK Terry  [facility]        Portions of the record may have been created with voice recognition software  Occasional wrong word or \"sound a like\" substitutions may have occurred due to the inherent limitations of voice recognition software  Read the chart carefully and recognize, using context, where substitutions have occurred or contact the dictating provider    "

## 2023-06-14 ENCOUNTER — OFFICE VISIT (OUTPATIENT)
Dept: PULMONOLOGY | Facility: MEDICAL CENTER | Age: 73
End: 2023-06-14
Payer: MEDICARE

## 2023-06-14 VITALS
HEIGHT: 62 IN | WEIGHT: 263 LBS | SYSTOLIC BLOOD PRESSURE: 160 MMHG | RESPIRATION RATE: 12 BRPM | BODY MASS INDEX: 48.4 KG/M2 | OXYGEN SATURATION: 92 % | DIASTOLIC BLOOD PRESSURE: 68 MMHG

## 2023-06-14 DIAGNOSIS — E66.01 MORBID OBESITY WITH BMI OF 45.0-49.9, ADULT (HCC): Chronic | ICD-10-CM

## 2023-06-14 DIAGNOSIS — R91.8 MULTIPLE PULMONARY NODULES DETERMINED BY COMPUTED TOMOGRAPHY OF LUNG: ICD-10-CM

## 2023-06-14 DIAGNOSIS — D05.11 DUCTAL CARCINOMA IN SITU (DCIS) OF RIGHT BREAST: ICD-10-CM

## 2023-06-14 DIAGNOSIS — J96.11 CHRONIC RESPIRATORY FAILURE WITH HYPOXIA AND HYPERCAPNIA (HCC): Primary | Chronic | ICD-10-CM

## 2023-06-14 DIAGNOSIS — J96.12 CHRONIC RESPIRATORY FAILURE WITH HYPOXIA AND HYPERCAPNIA (HCC): Primary | Chronic | ICD-10-CM

## 2023-06-14 DIAGNOSIS — J45.40 MODERATE PERSISTENT ASTHMA WITHOUT COMPLICATION: ICD-10-CM

## 2023-06-14 DIAGNOSIS — E66.2 OBESITY HYPOVENTILATION SYNDROME (HCC): ICD-10-CM

## 2023-06-14 DIAGNOSIS — G47.33 OSA (OBSTRUCTIVE SLEEP APNEA): ICD-10-CM

## 2023-06-14 PROCEDURE — 99214 OFFICE O/P EST MOD 30 MIN: CPT | Performed by: NURSE PRACTITIONER

## 2023-06-14 NOTE — ASSESSMENT & PLAN NOTE
She is doing well with CPAP and will continue nightly use  She is deriving benefit  Her machine is auto set from 4-7 cmH2O and she is using a median pressure of 4 3 with a maximum pressure of 6 1  Her AHI is 1 2  She has resupply with her equipment company and she is aware of proper use and maintenance of her machine

## 2023-06-14 NOTE — ASSESSMENT & PLAN NOTE
She will continue to follow with oncology  She reports that the plan is for at least radiation and she is pending further biopsy results

## 2023-06-14 NOTE — ASSESSMENT & PLAN NOTE
She will continue with Wixela and albuterol via nebulizer as needed  If she does not have improvement in her symptoms using this, she can return call to the office and we can consider steroid course  She does not have wheezing today

## 2023-06-14 NOTE — ASSESSMENT & PLAN NOTE
She will continue with supplemental oxygen via CPAP at night and as needed during the day  Can consider overnight pulse ox at next visit

## 2023-06-16 ENCOUNTER — TELEPHONE (OUTPATIENT)
Facility: HOSPITAL | Age: 73
End: 2023-06-16

## 2023-06-16 NOTE — TELEPHONE ENCOUNTER
I called patient today to let her know that she is on our radar however we are waiting to see if she needs chemo  I told her we will keep an eye on her chart

## 2023-06-19 DIAGNOSIS — I48.0 PAROXYSMAL ATRIAL FIBRILLATION (HCC): Primary | ICD-10-CM

## 2023-06-23 PROBLEM — D05.11 DUCTAL CARCINOMA IN SITU (DCIS) OF RIGHT BREAST: Status: ACTIVE | Noted: 2023-06-08

## 2023-06-26 ENCOUNTER — PATIENT OUTREACH (OUTPATIENT)
Dept: HEMATOLOGY ONCOLOGY | Facility: CLINIC | Age: 73
End: 2023-06-26

## 2023-06-26 DIAGNOSIS — D05.11 DUCTAL CARCINOMA IN SITU (DCIS) OF RIGHT BREAST: Primary | ICD-10-CM

## 2023-06-26 DIAGNOSIS — D64.9 ANEMIA, UNSPECIFIED TYPE: ICD-10-CM

## 2023-06-26 RX ORDER — B COMPLEX, C NO.20/FOLIC ACID 1 MG
1 CAPSULE ORAL DAILY
Qty: 30 CAPSULE | Refills: 5 | Status: SHIPPED | OUTPATIENT
Start: 2023-06-26

## 2023-06-26 NOTE — PROGRESS NOTES
Breast Oncology Nurse Navigator    Called patient for initial outreach from nurse navigator  Left voicemail message with contact information  Requested a call back  Referral placed to oncology social worker

## 2023-06-27 ENCOUNTER — TELEMEDICINE (OUTPATIENT)
Dept: ENDOCRINOLOGY | Facility: CLINIC | Age: 73
End: 2023-06-27
Payer: MEDICARE

## 2023-06-27 DIAGNOSIS — E11.65 TYPE 2 DIABETES MELLITUS WITH HYPERGLYCEMIA, WITHOUT LONG-TERM CURRENT USE OF INSULIN (HCC): Primary | ICD-10-CM

## 2023-06-27 DIAGNOSIS — E78.2 MIXED HYPERLIPIDEMIA: ICD-10-CM

## 2023-06-27 DIAGNOSIS — E66.01 MORBID OBESITY WITH BMI OF 45.0-49.9, ADULT (HCC): Chronic | ICD-10-CM

## 2023-06-27 DIAGNOSIS — E55.9 VITAMIN D DEFICIENCY: ICD-10-CM

## 2023-06-27 PROCEDURE — 99214 OFFICE O/P EST MOD 30 MIN: CPT | Performed by: INTERNAL MEDICINE

## 2023-06-27 NOTE — PROGRESS NOTES
REQUIRED DOCUMENTATION:      1  This service was provided via Telemedicine  2  Provider located at [place], [place]  3  TeleMed provider: Carolee Siegel MD   4  Identify all parties in room with patient during tele consult:  The patient, advanced practitioner and bedside nurse  5  Patient was then informed that this was a Telemedicine visit and that the exam was being conducted confidentially over secure lines  My office door was closed  No one else was in the room  Patient acknowledged consent and understanding of privacy and security of the Telemedicine visit, and gave us permission to have the assistant stay in the room in order to assist with the history and to conduct the exam   I informed the patient that I have reviewed their record in Epic and presented the opportunity for them to ask any questions regarding the visit today  The patient agreed to participate  Patient is aware this is a billable service  Follow-up Patient Progress Note      CC: Type 2 Diabetes     History of Present Illness:   79-year-old female with type 2 diabetes, HTN, HLD, HFpEF, A-fib, CKD 3 EGFR 35, KATRINA, COPD, ambulatory dysfunction, morbid obesity BMI 46, BrCA s/p Rt mastectomy 5/30/23 (pending radiation and antiestrogen therapy) and vitamin D deficiency  Last visit was 3/24/23      Since last visit, weight is same      Home blood glucose monitoring: Checks 3-4 times a day  Fasting up to 120 mg/dL and postprandial in 110 to 220 mg/dL range      Current meds:   Toujeo 25 units every 12 hours  Humalog 10u tidac plus 1u/50 above 150mg/dL  Semaglutide 1mg weekly        Opthamology: No  Podiatry:   vaccination: Yes  Dental:  Pancreatitis: No     Ace/ARB: No  Statin: Crestor  Thyroid issues: No    Patient Active Problem List   Diagnosis   • Moderate persistent asthma without complication   • Morbid obesity with BMI of 45 0-49 9, adult (HCC)   • Lower leg edema   • Paroxysmal atrial fibrillation (HCC)   • Mixed hyperlipidemia   • Anemia   • Essential hypertension   • PONV (postoperative nausea and vomiting)   • Gastroesophageal reflux disease   • Nephrolithiasis   • Arthritis   • S/P total knee replacement, right   • On continuous oral anticoagulation - eliquis   • Status post reverse total replacement of left shoulder   • Stage 3 chronic kidney disease ([facility])   • Peripheral venous insufficiency   • Post-herpetic polyneuropathy   • Raynaud's disease   • Lung nodule   • Chronic diastolic heart failure ([facility])   • Multiple pulmonary nodules determined by computed tomography of lung   • Mass overlapping multiple quadrants of right breast   • Pressure injury of deep tissue of sacral region   • Obesity hypoventilation syndrome ([facility])   • KATRINA (obstructive sleep apnea)   • Elevated serum creatinine   • Chronic respiratory failure with hypoxia and hypercapnia ([facility])   • Platelets decreased ([facility])   • PSVT (paroxysmal supraventricular tachycardia) ([facility])   • Rash   • Impaired mobility and ADLs   • Pressure injury of sacral region, stage 2 ([facility])   • Blister of right heel   • Heel blister   • Fall   • Syncope   • Type 2 diabetes mellitus with hyperglycemia, without long-term current use of insulin ([facility])   • Allergies   • Abnormal mammogram   • Intraductal papilloma of right breast   • Ductal carcinoma in situ (DCIS) of right breast     Past Medical History:   Diagnosis Date   • SEBASTIAN (acute kidney injury) ([facility] [address] ) 01/23/2023   • Anemia    • Asthma    • Atrial fibrillation ([facility])    • Chronic kidney disease    • COVID-19 January 2022   • Diabetes mellitus ([facility] [address] )    • GERD (gastroesophageal reflux disease)    • Hyperlipidemia    • Hypertension    • PONV (postoperative nausea and vomiting)    • Sleep apnea     wear BIPAP   • SVT (supraventricular tachycardia) ([facility] [address] )       Past Surgical History:   Procedure Laterality Date   • APPENDECTOMY     • BREAST BIOPSY Right     years ago when she was 23   • BREAST BIOPSY Right 03/13/2023   • BREAST LUMPECTOMY Right 2023    Procedure: RIGHT BREAST KRIS  DIRECTED LUMPECTOMY - [facility];  Surgeon: Chapincito Palacios MD;  Location: [facility] ER;  Service: Surgical Oncology   • CYSTOSCOPY W/ LASER LITHOTRIPSY Left 2016    Procedure: CYSTOSCOPY URETEROSCOPY WITH LITHOTRIPSY HOLMIUM LASER, RETROGRADE PYELOGRAM AND INSERTION STENT URETERAL;  Surgeon: Kirk West MD;  Location: [address] [place] [address];  Service:    • DILATION AND CURETTAGE OF UTERUS     • IR THORACENTESIS  2022   • JOINT REPLACEMENT      right knee   • KNEE ARTHROPLASTY Right    • MAMMO NEEDLE LOCALIZATION LEFT (ALL INC) EACH ADD Right 2023   • MAMMO STEREOTACTIC BREAST BIOPSY RIGHT (ALL INC) Right 2023    High Risk Lesion   • NC ARTHROPLASTY GLENOHUMERAL JOINT TOTAL SHOULDER Left 2022    Procedure: ARTHROPLASTY SHOULDER REVERSE;  Surgeon: Denton Esquivel MD;  Location: [address] [place] [address];  Service: Orthopedics   • NC CYSTO BLADDER W/URETERAL CATHETERIZATION Left 2016    Procedure: CYSTOSCOPY RETROGRADE PYELOGRAM WITH INSERTION STENT URETERAL, left;  Surgeon: Kirk West MD;  Location: [facility];  Service: Urology   • SHOULDER ARTHROTOMY Left       Family History   Problem Relation Age of Onset   • Heart disease Mother    • Diabetes Father    • Thyroid cancer Sister    • Heart disease Brother    • Diabetes Brother    • Heart disease Brother    • Heart disease Brother    • Emphysema Maternal Grandmother    • Heart disease Family      Social History     Tobacco Use   • Smoking status: Former     Packs/day: 1 00     Years: 20 00     Total pack years: 20 00     Types: Cigarettes     Quit date: 1996     Years since quittin 9   • Smokeless tobacco: Never   Substance Use Topics   • Alcohol use: Not Currently     Comment: rarely     Allergies   Allergen Reactions   • Penicillins Hives   • Moxifloxacin Other (See Comments)     unknown   • Oxycodone-Acetaminophen Other (See Comments)     GI "upset   • Zinc Acetate Other (See Comments)     unknown   • Penicillins Hives   • Asa [Aspirin] GI Intolerance   • Indocin [Indomethacin] Other (See Comments)     Made patient \"loopy\"   • Other Other (See Comments)     unknown   • Tannic Acid Rash         Current Outpatient Medications:   •  apixaban (ELIQUIS) 5 mg, Take 1 tablet (5 mg total) by mouth 2 (two) times a day, Disp: 60 tablet, Rfl: 2  •  albuterol (PROVENTIL HFA,VENTOLIN HFA) 90 mcg/act inhaler, Inhale 2 puffs every 6 (six) hours as needed for wheezing, Disp: , Rfl:   •  ammonium lactate (LAC-HYDRIN) 12 % lotion, APPLY TOPICALLY TO AFFECTED AREAS twice a day, Disp: , Rfl:   •  B Complex-C-Folic Acid (triphrocaps) 1 MG CAPS, Take 1 capsule (1 mg total) by mouth daily, Disp: 30 capsule, Rfl: 5  •  fluticasone (FLONASE) 50 mcg/act nasal spray, 1 spray into each nostril daily, Disp: , Rfl: 0  •  Fluticasone-Salmeterol (Wixela Inhub) 250-50 mcg/dose inhaler, Inhale 1 puff 2 (two) times a day Rinse mouth after use , Disp: 60 blister, Rfl: 3  •  glucose blood (OneTouch Verio) test strip, Use 1 each 4 (four) times a day Use as instructed, Disp: 400 strip, Rfl: 1  •  insulin glargine (Toujeo SoloStar) 300 units/mL CONCENTRATED U-300 injection pen (1-unit dial), Inject 25 Units under the skin every 12 (twelve) hours, Disp: , Rfl:   •  insulin lispro (HumaLOG KwikPen) 100 units/mL injection pen, Use 10 units prior to each meal +1 unit per 50 above 150 mg/dL, Disp: 15 mL, Rfl: 2  •  Insulin Pen Needle (BD Pen Needle Pilar 2nd Gen) 32G X 4 MM MISC, For use with insulin pen   Pharmacy may dispense brand covered by insurance , Disp: 100 each, Rfl: 0  •  Insulin Pen Needle (NovoFine Autocover) 30G X 8 MM MISC, Inject under the skin daily, Disp: 100 each, Rfl: 3  •  Insulin Pen Needle 30G X 8 MM MISC, 2 (two) times a day, Disp: , Rfl:   •  Insulin Pen Needle 31G X 8 MM MISC, Use daily Inject under the skin, Disp: 100 each, Rfl: 2  •  Lancets (onetouch ultrasoft) lancets, " Use once daily, Disp: 100 each, Rfl: 2  •  metoprolol succinate (TOPROL-XL) 50 mg 24 hr tablet, Take 1 tablet (50 mg total) by mouth 2 (two) times a day, Disp: 60 tablet, Rfl: 1  •  montelukast (SINGULAIR) 10 mg tablet, Take 1 tablet (10 mg total) by mouth daily at bedtime, Disp: 30 tablet, Rfl: 1  •  NovoFine Autocover Pen Needle 30G X 8 MM MISC, USE TWICE A DAY, Disp: 300 each, Rfl: 5  •  nystatin (MYCOSTATIN) powder, Apply topically 2 (two) times a day, Disp: 15 g, Rfl: 0  •  omeprazole (PriLOSEC) 20 mg delayed release capsule, Take 1 capsule (20 mg total) by mouth daily before breakfast, Disp: 30 capsule, Rfl: 0  •  pregabalin (LYRICA) 100 mg capsule, take 1 capsule by mouth twice a day, Disp: 180 capsule, Rfl: 1  •  rosuvastatin (CRESTOR) 10 MG tablet, take 1 tablet by mouth once daily, Disp: 90 tablet, Rfl: 1  •  semaglutide, 1 mg/dose, (Ozempic, 1 MG/DOSE,) 4 mg/3 mL injection pen, Inject 0 75 mL (1 mg total) under the skin every 7 days, Disp: 9 mL, Rfl: 1  •  torsemide (DEMADEX) 20 mg tablet, TAKE 2 TABLETS BY MOUTH IN THE A M , Disp: 180 tablet, Rfl: 1    Review of Systems   HENT: Negative  Eyes: Negative  Respiratory: Negative  Cardiovascular: Negative  Gastrointestinal: Negative  Endocrine: Negative  Musculoskeletal: Negative  Skin: Negative  Allergic/Immunologic: Negative  Neurological: Negative  Hematological: Negative  Psychiatric/Behavioral: Negative  Physical Exam:  There is no height or weight on file to calculate BMI  There were no vitals taken for this visit  There were no vitals filed for this visit  Physical Exam  Constitutional:       General: She is not in acute distress  Appearance: She is well-developed  She is not ill-appearing  HENT:      Head: Normocephalic and atraumatic  Nose: Nose normal       Mouth/Throat:      Pharynx: Oropharynx is clear  Eyes:      Extraocular Movements: Extraocular movements intact  Conjunctiva/sclera: Conjunctivae normal    Neck:      Thyroid: No thyromegaly  Cardiovascular:      Rate and Rhythm: Normal rate  Pulmonary:      Effort: Pulmonary effort is normal    Musculoskeletal:         General: No deformity  Cervical back: Normal range of motion  Skin:     Capillary Refill: Capillary refill takes less than 2 seconds  Coloration: Skin is not pale  Findings: No rash  Neurological:      Mental Status: She is alert and oriented to person, place, and time  Psychiatric:         Behavior: Behavior normal          Labs:   Lab Results   Component Value Date    HGBA1C 8 1 (H) 03/25/2023       Lab Results   Component Value Date    [id number] 2 481 01/22/2023       Lab Results   Component Value Date    CREATININE 1 82 (H) 04/30/2023    CREATININE 1 67 (H) 04/29/2023    CREATININE 1 79 (H) 04/28/2023    BUN 47 (H) 04/30/2023    K 4 7 04/30/2023    CL 98 04/30/2023    CO2 28 04/30/2023     eGFR   Date Value Ref Range Status   04/30/2023 27 ml/min/1 73sq m Final       Lab Results   Component Value Date    ALT 15 04/28/2023    AST 18 04/28/2023    ALKPHOS 57 04/28/2023       Lab Results   Component Value Date    CHOLESTEROL 155 12/20/2022    CHOLESTEROL 141 07/20/2022    CHOLESTEROL 104 03/10/2022     Lab Results   Component Value Date    HDL 42 (L) 12/20/2022    HDL 35 (L) 07/20/2022    HDL 30 (L) 03/10/2022     Lab Results   Component Value Date    TRIG 259 (H) 12/20/2022    TRIG 376 (H) 07/20/2022    TRIG 132 03/10/2022     Lab Results   Component Value Date    NONHDLC 113 12/20/2022    [place] 106 07/20/2022         Impression:  1  Type 2 diabetes mellitus with hyperglycemia, without long-term current use of insulin ([facility] [address] )    2  Morbid obesity with BMI of 45 0-49 9, adult ([facility] [address] )    3  Mixed hyperlipidemia    4   Vitamin D deficiency         Plan:    Diagnoses and all orders for this visit:    Type 2 diabetes mellitus with hyperglycemia, without long-term current use of insulin ([facility] )   She is uncontrolled with an A1c of 8%  Goal is less than 7%  Her diabetes is complicated with recent mastectomy for breast cancer and ongoing radiation/hormone therapy  Today we discussed options and agreed to continue her current regimen of Toujeo 25 units twice daily, Humalog 10 units 3 times daily AC +1 unit/50 above 150 mg/dL and Ozempic 1 mg weekly  We will use a professional CGM to monitor data based on which we can adjust therapy  In future I aim to remove prandial insulin completely and use basal insulin and a GLP-1 agonist     Follow-up 3 months personally  -     Hemoglobin A1C; Future  -     Albumin / creatinine urine ratio; Future  -     Continous glucose monitoring kar placement; Future  -     Continous glucose monitoring kar intrepretation; Future    Morbid obesity with BMI of 45 0-49 9, adult ([facility] )  Discussed weight loss strategies  In future will increase GLP-1 agonist     Mixed hyperlipidemia  Continue Crestor    Vitamin D deficiency  Advised vitamin D3 5000 IU daily OTC  I have spent 32 minutes with patient today in which greater than 50% of this time was spent in counseling/coordination of care  Discussed with the patient and all questioned fully answered  She will call me if any problems arise  Educated/ Counseled patient on diagnostic test results, prognosis, risk vs benefit of treatment options, importance of treatment compliance, healthy life and lifestyle choices        [address]

## 2023-06-28 ENCOUNTER — TELEPHONE (OUTPATIENT)
Dept: HEMATOLOGY ONCOLOGY | Facility: CLINIC | Age: 73
End: 2023-06-28

## 2023-06-28 ENCOUNTER — OFFICE VISIT (OUTPATIENT)
Dept: SURGICAL ONCOLOGY | Facility: CLINIC | Age: 73
End: 2023-06-28

## 2023-06-28 VITALS
OXYGEN SATURATION: 94 % | HEIGHT: 62 IN | SYSTOLIC BLOOD PRESSURE: 134 MMHG | WEIGHT: 261 LBS | BODY MASS INDEX: 48.03 KG/M2 | TEMPERATURE: 97.9 F | HEART RATE: 72 BPM | DIASTOLIC BLOOD PRESSURE: 70 MMHG | RESPIRATION RATE: 16 BRPM

## 2023-06-28 DIAGNOSIS — D05.11 DUCTAL CARCINOMA IN SITU (DCIS) OF RIGHT BREAST: ICD-10-CM

## 2023-06-28 DIAGNOSIS — Z98.890 STATUS POST RIGHT BREAST LUMPECTOMY: ICD-10-CM

## 2023-06-28 DIAGNOSIS — Z71.89 OTHER SPECIFIED COUNSELING: Primary | ICD-10-CM

## 2023-06-28 PROCEDURE — 99024 POSTOP FOLLOW-UP VISIT: CPT | Performed by: SURGERY

## 2023-06-28 NOTE — PROGRESS NOTES
Surgical Oncology Follow Up  [facility]  [facility] [place]  [address]  [facility] [address] [facility] [address]  1950  [id number]      Chief Complaint   Patient presents with   • Post-op     POST OP SX 5/30/23 right bhavik lumpectomy DCIS G 1-2 largest 6mm (few foci) closest margin medial 5mm, ALH   (order genetics)  on bx: differential diagnosis includes DICS/intraductal papilloma        Assessment & Plan:   -year-old female had an atypical papillary lesion in her right breast she went right with [facility] directed lumpectomy  Final pathology is consistent with ductal carcinoma in situ  Pathology was discussed and copy of the report was given to the patient  Estrogen and progesterone positive we will refer her to medical and radiation oncologist we will see her in 6 months time  Cancer History:     Oncology History   Ductal carcinoma in situ (DCIS) of right breast   3/13/2023 Biopsy    A  Breast, Right, right breast calcs  9:00:  -Fragments of Atypical intraductal papillary proliferation with central hyalinized stromal core and associated calcification       Comment  The differential diagnosis includes  ductal carcinoma in situ /DCIS arising in the background of intraductal papilloma vs  intraductal papilloma with atypical ductal hyperplasia   Further characterization will be performed on the resection specimen     5/30/2023 Initial Diagnosis    Ductal carcinoma in situ (DCIS) of right breast     5/30/2023 Surgery    Right breast bhavik  directed lumpectomy  Ductal carcinoma in situ  Grade 1-2  Few foci of DCIS present, with the largest contiguous focus measuring 6mm  Closest margin: medial (approximately 5mm from DCIS)                   Interval History:   Follow-up after right breast lumpectomy final pathology is consistent with ductal carcinoma in situ    Review of Systems:   Review of Systems   Constitutional: Negative for chills and fever  HENT: Negative for ear pain and sore throat  Eyes: Negative for pain and visual disturbance  Respiratory: Negative for cough and shortness of breath  Cardiovascular: Negative for chest pain and palpitations  Gastrointestinal: Negative for abdominal pain and vomiting  Genitourinary: Negative for dysuria and hematuria  Musculoskeletal: Negative for arthralgias and back pain  Skin: Negative for color change and rash  Neurological: Negative for seizures and syncope  All other systems reviewed and are negative        Past Medical History     Patient Active Problem List   Diagnosis   • Moderate persistent asthma without complication   • Morbid obesity with BMI of 45 0-49 9, adult ([facility])   • Lower leg edema   • Paroxysmal atrial fibrillation ([facility])   • Mixed hyperlipidemia   • Anemia   • Essential hypertension   • PONV (postoperative nausea and vomiting)   • Gastroesophageal reflux disease   • Nephrolithiasis   • Arthritis   • S/P total knee replacement, right   • On continuous oral anticoagulation - eliquis   • Status post reverse total replacement of left shoulder   • Stage 3 chronic kidney disease ([facility])   • Peripheral venous insufficiency   • Post-herpetic polyneuropathy   • Raynaud's disease   • Lung nodule   • Chronic diastolic heart failure ([facility])   • Multiple pulmonary nodules determined by computed tomography of lung   • Mass overlapping multiple quadrants of right breast   • Pressure injury of deep tissue of sacral region   • Obesity hypoventilation syndrome ([facility])   • KATRINA (obstructive sleep apnea)   • Elevated serum creatinine   • Chronic respiratory failure with hypoxia and hypercapnia ([facility])   • Platelets decreased ([facility])   • PSVT (paroxysmal supraventricular tachycardia) ([facility])   • Rash   • Impaired mobility and ADLs   • Pressure injury of sacral region, stage 2 ([facility])   • Blister of right heel   • Heel blister   • Fall   • Syncope   • Type 2 diabetes mellitus with hyperglycemia, without long-term current use of insulin ([facility])   • Allergies   • Abnormal mammogram   • Intraductal papilloma of right breast   • Ductal carcinoma in situ (DCIS) of right breast     Past Medical History:   Diagnosis Date   • SEBASTIAN (acute kidney injury) ([facility] [address] ) 01/23/2023   • Anemia    • Asthma    • Atrial fibrillation (HCC)    • Chronic kidney disease    • COVID-19 January 2022   • Diabetes mellitus ([facility] [address] )    • GERD (gastroesophageal reflux disease)    • Hyperlipidemia    • Hypertension    • PONV (postoperative nausea and vomiting)    • Sleep apnea     wear BIPAP   • [facility]      Past Surgical History:   Procedure Laterality Date   • APPENDECTOMY     • BREAST BIOPSY Right     years ago when she was 23   • BREAST BIOPSY Right 03/13/2023   • BREAST LUMPECTOMY Right 5/30/2023    Procedure: RIGHT BREAST KRIS  DIRECTED LUMPECTOMY - [facility];  Surgeon: Velia Veras MD;  Location: [facility];  Service: Surgical Oncology   • CYSTOSCOPY W/ LASER LITHOTRIPSY Left 07/12/2016    Procedure: CYSTOSCOPY URETEROSCOPY WITH LITHOTRIPSY HOLMIUM LASER, RETROGRADE PYELOGRAM AND INSERTION STENT URETERAL;  Surgeon: Meghan Lara MD;  Location: [address] [place] [address];  Service:    • DILATION AND CURETTAGE OF UTERUS     • IR THORACENTESIS  03/18/2022   • JOINT REPLACEMENT      right knee   • KNEE ARTHROPLASTY Right    • MAMMO NEEDLE LOCALIZATION LEFT (ALL INC) EACH ADD Right 4/24/2023   • MAMMO STEREOTACTIC BREAST BIOPSY RIGHT (ALL INC) Right 03/13/2023    High Risk Lesion   • MT ARTHROPLASTY GLENOHUMERAL JOINT TOTAL SHOULDER Left 03/01/2022    Procedure: ARTHROPLASTY SHOULDER REVERSE;  Surgeon: Gelene Bamberger, MD;  Location: [address] [place] [address];  Service: Orthopedics   • MT CYSTO BLADDER W/URETERAL CATHETERIZATION Left 06/29/2016    Procedure: CYSTOSCOPY RETROGRADE PYELOGRAM WITH INSERTION STENT URETERAL, left;  Surgeon: Meghan Lara MD;  Location: [facility];  Service: Urology   • SHOULDER ARTHROTOMY Left      Family History   Problem Relation Age of Onset   • Heart disease Mother    • Diabetes Father    • Thyroid cancer Sister    • Heart disease Brother    • Diabetes Brother    • Heart disease Brother    • Heart disease Brother    • Emphysema Maternal Grandmother    • Heart disease Family      Social History     Socioeconomic History   • Marital status: Single     Spouse name: Not on file   • Number of children: 0   • Years of education: 15   • Highest education level: Associate degree: academic program   Occupational History   • Not on file   Tobacco Use   • Smoking status: Former     Packs/day: 1 00     Years: 20 00     Total pack years: 20 00     Types: Cigarettes     Quit date: 1996     Years since quittin 9   • Smokeless tobacco: Never   Vaping Use   • Vaping Use: Never used   Substance and Sexual Activity   • Alcohol use: Not Currently     Comment: rarely   • Drug use: Never   • Sexual activity: Not Currently   Other Topics Concern   • Not on file   Social History Narrative    ** Merged History Encounter **          Social Determinants of Health     Financial Resource Strain: Not on file   Food Insecurity: No Food Insecurity (3/28/2023)    Hunger Vital Sign    • Worried About Running Out of Food in the Last Year: Never true    • Ran Out of Food in the Last Year: Never true   Transportation Needs: No Transportation Needs (3/28/2023)    PRAPARE - Transportation    • Lack of Transportation (Medical): No    • Lack of Transportation (Non-Medical):  No   Physical Activity: Not on file   Stress: Not on file   Social Connections: Not on file   Intimate Partner Violence: Not on file   Housing Stability: Low Risk  (3/28/2023)    Housing Stability Vital Sign    • Unable to Pay for Housing in the Last Year: No    • Number of Places Lived in the Last Year: 2    • Unstable Housing in the Last Year: No       Current Outpatient Medications:   •  albuterol (PROVENTIL HFA,VENTOLIN HFA) 90 mcg/act inhaler, Inhale 2 puffs every 6 (six) hours as needed for wheezing, Disp: , Rfl:   •  ammonium lactate (LAC-HYDRIN) 12 % lotion, APPLY TOPICALLY TO AFFECTED AREAS twice a day, Disp: , Rfl:   •  apixaban (ELIQUIS) 5 mg, Take 1 tablet (5 mg total) by mouth 2 (two) times a day, Disp: 60 tablet, Rfl: 2  •  B Complex-C-Folic Acid (triphrocaps) 1 MG CAPS, Take 1 capsule (1 mg total) by mouth daily, Disp: 30 capsule, Rfl: 5  •  fluticasone (FLONASE) 50 mcg/act nasal spray, 1 spray into each nostril daily, Disp: , Rfl: 0  •  glucose blood (OneTouch Verio) test strip, Use 1 each 4 (four) times a day Use as instructed, Disp: 400 strip, Rfl: 1  •  insulin glargine (Toujeo SoloStar) 300 units/mL CONCENTRATED U-300 injection pen (1-unit dial), Inject 25 Units under the skin every 12 (twelve) hours, Disp: , Rfl:   •  insulin lispro (HumaLOG KwikPen) 100 units/mL injection pen, Use 10 units prior to each meal +1 unit per 50 above 150 mg/dL, Disp: 15 mL, Rfl: 2  •  Insulin Pen Needle (BD Pen Needle Pilar 2nd Gen) 32G X 4 MM MISC, For use with insulin pen   Pharmacy may dispense brand covered by insurance , Disp: 100 each, Rfl: 0  •  Insulin Pen Needle (NovoFine Autocover) 30G X 8 MM MISC, Inject under the skin daily, Disp: 100 each, Rfl: 3  •  Insulin Pen Needle 30G X 8 MM MISC, 2 (two) times a day, Disp: , Rfl:   •  Lancets (onetouch ultrasoft) lancets, Use once daily, Disp: 100 each, Rfl: 2  •  metoprolol succinate (TOPROL-XL) 50 mg 24 hr tablet, Take 1 tablet (50 mg total) by mouth 2 (two) times a day, Disp: 60 tablet, Rfl: 1  •  montelukast (SINGULAIR) 10 mg tablet, Take 1 tablet (10 mg total) by mouth daily at bedtime, Disp: 30 tablet, Rfl: 1  •  NovoFine Autocover Pen Needle 30G X 8 MM MISC, USE TWICE A DAY, Disp: 300 each, Rfl: 5  •  nystatin (MYCOSTATIN) powder, Apply topically 2 (two) times a day, Disp: 15 g, Rfl: 0  •  omeprazole (PriLOSEC) 20 mg delayed release capsule, Take 1 capsule (20 mg total) by mouth daily "before breakfast, Disp: 30 capsule, Rfl: 0  •  pregabalin (LYRICA) 100 mg capsule, take 1 capsule by mouth twice a day, Disp: 180 capsule, Rfl: 1  •  rosuvastatin (CRESTOR) 10 MG tablet, take 1 tablet by mouth once daily, Disp: 90 tablet, Rfl: 1  •  semaglutide, 1 mg/dose, (Ozempic, 1 MG/DOSE,) 4 mg/3 mL injection pen, Inject 0 75 mL (1 mg total) under the skin every 7 days, Disp: 9 mL, Rfl: 1  •  torsemide (DEMADEX) 20 mg tablet, TAKE 2 TABLETS BY MOUTH IN THE A M , Disp: 180 tablet, Rfl: 1  •  Fluticasone-Salmeterol (Wixela Inhub) 250-50 mcg/dose inhaler, Inhale 1 puff 2 (two) times a day Rinse mouth after use , Disp: 60 blister, Rfl: 3  •  Insulin Pen Needle 31G X 8 MM MISC, Use daily Inject under the skin, Disp: 100 each, Rfl: 2  Allergies   Allergen Reactions   • Penicillins Hives   • Moxifloxacin Other (See Comments)     unknown   • Oxycodone-Acetaminophen Other (See Comments)     GI upset   • Zinc Acetate Other (See Comments)     unknown   • Penicillins Hives   • Asa [Aspirin] GI Intolerance   • Indocin [Indomethacin] Other (See Comments)     Made patient \"loopy\"   • Other Other (See Comments)     unknown   • Tannic Acid Rash       Physical Exam:     Vitals:    06/28/23 1549   Resp: 16     Physical Exam  Constitutional:       Appearance: Normal appearance  HENT:      Head: Normocephalic and atraumatic  Nose: Nose normal       Mouth/Throat:      Mouth: Mucous membranes are moist    Eyes:      Pupils: Pupils are equal, round, and reactive to light  Cardiovascular:      Rate and Rhythm: Normal rate  Pulses: Normal pulses  Heart sounds: Normal heart sounds  Pulmonary:      Effort: Pulmonary effort is normal       Breath sounds: Normal breath sounds  Chest:      Comments: Right breast incision well-healed no evidence of surgical site infection or seroma at this time  Abdominal:      General: Bowel sounds are normal       Palpations: Abdomen is soft     Musculoskeletal:         General: " Normal range of motion  Cervical back: Normal range of motion and neck supple  Skin:     General: Skin is warm  Neurological:      General: No focal deficit present  Mental Status: She is alert and oriented to person, place, and time  Psychiatric:         Mood and Affect: Mood normal          Behavior: Behavior normal          Thought Content: Thought content normal          Judgment: Judgment normal            Results & Discussion:   Pathology:   Right breast (lumpectomy):     - Ductal carcinoma in-situ (DCIS), grade 1-2 of 3      - Few foci of DCIS present, with the largest contiguous focus measuring 6 mm  - Closest margin: medial (approximately 5 mm from DCIS)  - Background intraductal papilloma  See comment       - Non-invasive lobular neoplasia ([facility]     did review pathology in detail and copy of the report was given to the patient I did discussed in detail nature of breast cancer particularly ductal carcinoma in situ further management  We will refer her to medical and radiation oncologist   We will follow her in 6 months  she understands and  agrees   All patient questions were answered  Advance Care Planning/Advance Directives: Xenia Gupta MD discussed the disease status with Mila Chavez  today 06/28/23  treatment plans and follow-up with the patient

## 2023-06-28 NOTE — TELEPHONE ENCOUNTER
Appointment Confirmation   Who are you speaking with? self   If it is not the patient, are they listed on an active communication consent form? self   Which provider is the appointment scheduled with? L[facility]   When is the appointment scheduled? Please list date and time 6/28 3:45pm   At which location is the appointment scheduled to take place? [facility]   Did caller verbalize understanding of appointment details? On her way but slow traffic!

## 2023-07-03 ENCOUNTER — PATIENT OUTREACH (OUTPATIENT)
Dept: CASE MANAGEMENT | Facility: HOSPITAL | Age: 73
End: 2023-07-03

## 2023-07-03 NOTE — PROGRESS NOTES
OncSW referral received, chart review completed. Pt has had her surgery with Dr. Lisa Looney and post op appt already, recommendations are consult with Rad Onc and Med Onc. MSW will outreach in the near future to assess for needs.

## 2023-07-11 ENCOUNTER — OFFICE VISIT (OUTPATIENT)
Dept: ENDOCRINOLOGY | Facility: CLINIC | Age: 73
End: 2023-07-11
Payer: MEDICARE

## 2023-07-11 DIAGNOSIS — E11.65 TYPE 2 DIABETES MELLITUS WITH HYPERGLYCEMIA, WITHOUT LONG-TERM CURRENT USE OF INSULIN (HCC): ICD-10-CM

## 2023-07-11 PROCEDURE — 95250 CONT GLUC MNTR PHYS/QHP EQP: CPT | Performed by: INTERNAL MEDICINE

## 2023-07-11 PROCEDURE — 99211 OFF/OP EST MAY X REQ PHY/QHP: CPT | Performed by: INTERNAL MEDICINE

## 2023-07-11 NOTE — PROGRESS NOTES
Continous glucose monitoring dawn placement     Date/Time 7/11/2023 3:00 PM     Performed by  Rigoberto Thapa   Authorized by Radha Rubio MD     [place] Protocol   Consent: Verbal consent obtained. Written consent obtained.   Consent given by: patient  Patient understanding: patient states understanding of the procedure being performed  Patient consent: the patient's understanding of the procedure matches consent given  Patient identity confirmed: verbally with patient        Local anesthesia used: no     Anesthesia   Local anesthesia used: no     Sedation   Patient sedated: no        Specimen: no    Culture: no   Procedure Details   Procedure Notes: Placed on Left Arm  Dawn Pro  SN: [id number]  EXP: 12/31/2023  Patient tolerance: patient tolerated the procedure well with no immediate complications

## 2023-07-13 ENCOUNTER — OFFICE VISIT (OUTPATIENT)
Dept: INTERNAL MEDICINE CLINIC | Facility: CLINIC | Age: 73
End: 2023-07-13
Payer: MEDICARE

## 2023-07-13 ENCOUNTER — PATIENT OUTREACH (OUTPATIENT)
Dept: HEMATOLOGY ONCOLOGY | Facility: CLINIC | Age: 73
End: 2023-07-13

## 2023-07-13 VITALS
HEIGHT: 62 IN | DIASTOLIC BLOOD PRESSURE: 82 MMHG | OXYGEN SATURATION: 94 % | WEIGHT: 265 LBS | BODY MASS INDEX: 48.76 KG/M2 | SYSTOLIC BLOOD PRESSURE: 126 MMHG | HEART RATE: 83 BPM

## 2023-07-13 DIAGNOSIS — J45.40 MODERATE PERSISTENT ASTHMA WITHOUT COMPLICATION: ICD-10-CM

## 2023-07-13 DIAGNOSIS — D24.1 INTRADUCTAL PAPILLOMA OF RIGHT BREAST: ICD-10-CM

## 2023-07-13 DIAGNOSIS — E11.00 TYPE 2 DIABETES MELLITUS WITH HYPEROSMOLARITY WITHOUT COMA, WITHOUT LONG-TERM CURRENT USE OF INSULIN (HCC): ICD-10-CM

## 2023-07-13 DIAGNOSIS — E78.2 MIXED HYPERLIPIDEMIA: ICD-10-CM

## 2023-07-13 DIAGNOSIS — N63.15 MASS OVERLAPPING MULTIPLE QUADRANTS OF RIGHT BREAST: ICD-10-CM

## 2023-07-13 DIAGNOSIS — D64.9 ANEMIA, UNSPECIFIED TYPE: ICD-10-CM

## 2023-07-13 DIAGNOSIS — G47.33 OSA (OBSTRUCTIVE SLEEP APNEA): ICD-10-CM

## 2023-07-13 DIAGNOSIS — I47.1 PSVT (PAROXYSMAL SUPRAVENTRICULAR TACHYCARDIA) (HCC): ICD-10-CM

## 2023-07-13 DIAGNOSIS — I87.2 PERIPHERAL VENOUS INSUFFICIENCY: ICD-10-CM

## 2023-07-13 DIAGNOSIS — J96.11 CHRONIC RESPIRATORY FAILURE WITH HYPOXIA AND HYPERCAPNIA (HCC): Chronic | ICD-10-CM

## 2023-07-13 DIAGNOSIS — R60.0 LOWER LEG EDEMA: ICD-10-CM

## 2023-07-13 DIAGNOSIS — D05.11 DUCTAL CARCINOMA IN SITU (DCIS) OF RIGHT BREAST: ICD-10-CM

## 2023-07-13 DIAGNOSIS — N18.32 STAGE 3B CHRONIC KIDNEY DISEASE (HCC): ICD-10-CM

## 2023-07-13 DIAGNOSIS — E11.65 TYPE 2 DIABETES MELLITUS WITH HYPERGLYCEMIA, WITHOUT LONG-TERM CURRENT USE OF INSULIN (HCC): Primary | ICD-10-CM

## 2023-07-13 DIAGNOSIS — E66.01 MORBID OBESITY WITH BMI OF 45.0-49.9, ADULT (HCC): Chronic | ICD-10-CM

## 2023-07-13 DIAGNOSIS — I10 ESSENTIAL HYPERTENSION: ICD-10-CM

## 2023-07-13 DIAGNOSIS — Z74.09 IMPAIRED MOBILITY AND ADLS: ICD-10-CM

## 2023-07-13 DIAGNOSIS — Z00.00 MEDICARE ANNUAL WELLNESS VISIT, SUBSEQUENT: ICD-10-CM

## 2023-07-13 DIAGNOSIS — D69.6 PLATELETS DECREASED (HCC): ICD-10-CM

## 2023-07-13 DIAGNOSIS — Z96.612 STATUS POST REVERSE TOTAL REPLACEMENT OF LEFT SHOULDER: ICD-10-CM

## 2023-07-13 DIAGNOSIS — Z78.9 IMPAIRED MOBILITY AND ADLS: ICD-10-CM

## 2023-07-13 DIAGNOSIS — E66.2 OBESITY HYPOVENTILATION SYNDROME (HCC): ICD-10-CM

## 2023-07-13 DIAGNOSIS — I73.00 RAYNAUD'S DISEASE WITHOUT GANGRENE: ICD-10-CM

## 2023-07-13 DIAGNOSIS — I48.0 PAROXYSMAL ATRIAL FIBRILLATION (HCC): ICD-10-CM

## 2023-07-13 DIAGNOSIS — J96.12 CHRONIC RESPIRATORY FAILURE WITH HYPOXIA AND HYPERCAPNIA (HCC): Chronic | ICD-10-CM

## 2023-07-13 DIAGNOSIS — R91.1 LUNG NODULE: ICD-10-CM

## 2023-07-13 DIAGNOSIS — K21.9 GASTROESOPHAGEAL REFLUX DISEASE WITHOUT ESOPHAGITIS: ICD-10-CM

## 2023-07-13 DIAGNOSIS — I50.32 CHRONIC DIASTOLIC HEART FAILURE (HCC): ICD-10-CM

## 2023-07-13 DIAGNOSIS — Z96.651 S/P TOTAL KNEE REPLACEMENT, RIGHT: ICD-10-CM

## 2023-07-13 PROBLEM — B02.23 POST-HERPETIC POLYNEUROPATHY: Status: RESOLVED | Noted: 2022-03-25 | Resolved: 2023-07-13

## 2023-07-13 PROBLEM — S90.829A: Status: RESOLVED | Noted: 2022-12-12 | Resolved: 2023-07-13

## 2023-07-13 PROBLEM — S90.821A BLISTER OF RIGHT HEEL: Status: RESOLVED | Noted: 2022-12-06 | Resolved: 2023-07-13

## 2023-07-13 PROCEDURE — G0439 PPPS, SUBSEQ VISIT: HCPCS | Performed by: INTERNAL MEDICINE

## 2023-07-13 PROCEDURE — 99214 OFFICE O/P EST MOD 30 MIN: CPT | Performed by: INTERNAL MEDICINE

## 2023-07-13 RX ORDER — ROSUVASTATIN CALCIUM 10 MG/1
TABLET, COATED ORAL
Qty: 90 TABLET | Refills: 1 | Status: SHIPPED | OUTPATIENT
Start: 2023-07-13

## 2023-07-13 NOTE — ASSESSMENT & PLAN NOTE
Echo 3/27/23:  LVEF 50 to 55%. Mild concentric LVH. LVIDD 4.7cm  Normal RV size and systolic function  Trace MR  Estimated PA systolic pressure 39 mmHg     Pharm Nuc Stress 8/26/22: No ischemia. There is a left ventricular perfusion defect that is small in size with mild reduction in uptake present in the anterior and apex location(s) that is fixed. The defect appears to be an artifact caused by breast attenuation. LVEF 70%     Echocardiogram 3/10/22  LVEF: 53%  LVIDd: normal cavity size  RV: normal size and systolic function  MR: none  PASP: inadequate TR envelope for estimation  RVOT: no notching, more parabolic  Other: unable to assess diastolic function, mildly increased wall thickness     Pharm Nuc Stress 7/31/2019:  There was a small, mildly severe, fixed myocardial perfusion defect of the apical apical and anterior wall likely due to attenuation from breast tissue. Echo 5/29/22: LVEF 55%. LVIDD 4.1cm. Mild to moderately increased wall thickness. Grade 1 diastology. Mild MR. Normal RV size and systolic function.      Diet:  2 g sodium diet  2000 mL fluid restriction     Therapeutic  Torsemide 40mg daily  Metoprolol succinate 50mg daily  Jardiance - endo deferring due to renal function and h/o UTIs     Patient denies any chest pain palpitation PND orthopnea. She does have a moderate edema both lower extremity.   Current medications includes Eliquis 5 mg twice a day, torsemide 20 mg 2 tablet equal to 40 mg milligrams daily in the morning straight as well as in the evening she will be taking 20 mg on a as needed basis per the weight scale, metoprolol succinate 50 mg daily,

## 2023-07-13 NOTE — ASSESSMENT & PLAN NOTE
Patient and family reports numerous small superficial pressure injury on the buttock area on both sides. Family member is keeping an close eye on it. Sacral ulcer is not an issue anymore. They will keep an eye and let me know.   Examination was deferred per their request.

## 2023-07-13 NOTE — ASSESSMENT & PLAN NOTE
Wt Readings from Last 3 Encounters:   07/13/23 120 kg (265 lb)   06/28/23 118 kg (261 lb)   06/14/23 119 kg (263 lb)       Echo 3/27/23:  LVEF 50 to 55%. Mild concentric LVH. LVIDD 4.7cm  Normal RV size and systolic function  Trace MR  Estimated PA systolic pressure 39 mmHg     Pharm Nuc Stress 8/26/22: No ischemia. There is a left ventricular perfusion defect that is small in size with mild reduction in uptake present in the anterior and apex location(s) that is fixed. The defect appears to be an artifact caused by breast attenuation. LVEF 70%     Echocardiogram 3/10/22  LVEF: 53%  LVIDd: normal cavity size  RV: normal size and systolic function  MR: none  PASP: inadequate TR envelope for estimation  RVOT: no notching, more parabolic  Other: unable to assess diastolic function, mildly increased wall thickness     Pharm Nuc Stress 7/31/2019:  There was a small, mildly severe, fixed myocardial perfusion defect of the apical apical and anterior wall likely due to attenuation from breast tissue. Echo 5/29/22: LVEF 55%. LVIDD 4.1cm. Mild to moderately increased wall thickness. Grade 1 diastology. Mild MR. Normal RV size and systolic function.      Diet:  2 g sodium diet  2000 mL fluid restriction     Therapeutic  Torsemide 40mg daily  Metoprolol succinate 50mg daily  Jardiance - endo deferring due to renal function and h/o UTIs     Patient denies any chest pain palpitation PND orthopnea. She does have a moderate edema both lower extremity.   Current medications includes Eliquis 5 mg twice a day, torsemide 20 mg 2 tablet equal to 40 mg milligrams daily in the morning straight as well as in the evening she will be taking 20 mg on a as needed basis per the weight scale, metoprolol succinate 50 mg daily,

## 2023-07-13 NOTE — ASSESSMENT & PLAN NOTE
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 Mrs. Hand Pap feels well, clinically there are no concerning findings. Patient will follow-up with Dr. Lisa Looney as directed. Patient denies any family history of breast or ovarian cancer.     NCCN guidelines 4.2023 states that for patients with DCIS status post BCS, adjuvant treatments include whole breast irradiation +/- boost.  Radiation oncology has been consulted.   Additional risk reduction therapy includes endocrine therapy for 5 years if the patient is ER positive    5/30/2023: Surgical procedure note:  Preop Diagnosis:  Mass overlapping multiple quadrants of right breast [N63.15]     Post-Op Diagnosis Codes:     * Mass overlapping multiple quadrants of right breast [N63.15]     Procedure(s):  Right - RIGHT BREAST KRIS  DIRECTED LUMPECTOMY - KRIS BRACKETTED    Patient has follow-up appointment with on August 2 and final decisions will be made by hematologist at that point

## 2023-07-13 NOTE — ASSESSMENT & PLAN NOTE
Occasional heartburn she takes OTC Nexium with relief of the symptoms. In the remote past she was on omeprazole but not taking at this time.

## 2023-07-13 NOTE — ASSESSMENT & PLAN NOTE
6/27/2023: Endocrinologist notes were reviewed. Current medication regimen is under 90. Lab Results   Component Value Date    HGBA1C 8.1 (H) 03/25/2023   Toujeo 25 units every 12 hours  Humalog 10u tidac plus 1u/50 above 150mg/dL  Semaglutide 1mg weekly.

## 2023-07-13 NOTE — ASSESSMENT & PLAN NOTE
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 Mrs. Deena Ibarra feels well, clinically there are no concerning findings. Patient will follow-up with Dr. Wan Alcaraz as directed. Patient denies any family history of breast or ovarian cancer.       Patient has follow-up appointment with on August 2 and final decisions will be made by hematologist at that point  NCCN guidelines 4.2023 states that for patients with DCIS status post BCS, adjuvant treatments include whole breast irradiation +/- boost.  Radiation oncology has been consulted.   Additional risk reduction therapy includes endocrine therapy for 5 years if the patient is ER positive    5/30/2023: Surgical procedure note:  Preop Diagnosis:  Mass overlapping multiple quadrants of right breast [N63.15]     Post-Op Diagnosis Codes:     * Mass overlapping multiple quadrants of right breast [N63.15]     Procedure(s):  Right - RIGHT BREAST KRIS  DIRECTED LUMPECTOMY - Elias Crooked

## 2023-07-13 NOTE — ASSESSMENT & PLAN NOTE
CT scan of the chest: 3/29/2022 reviewed  7/8/2022 CT scan of the chest reviewed 3-month follow-up was recommended  11/29/2022 CT scan of the chest reviewed to new sub-6 mm groundglass nodule in the right upper lobe additional bilateral pulmonary nodules were stable. Previously reported enlarging nodule in the right lower lobe measuring 9 mm was stable. 6-14- 2023 pulmonologist office visit reviewed    3/26/2023 CT scan of the chest abdomen and pelvis no evidence of malignancy or source of sepsis identified stable pulmonary nodule hepatomegaly and diverticulosis      Pulmonologist notes were noted.   They are recommending follow-up CT scan of the chest in April 2024

## 2023-07-13 NOTE — ASSESSMENT & PLAN NOTE
6-14- 2023 pulmonologist office visit reviewed      3- rec:  he patient should be treated with CPAP for mild obstructive sleep apnea, at 7 cm of water using a ResMed N30 nasal mask size small. Oxygen supplementation of at least 1 L/min is recommended to keep oxygen saturation above 90%. 2. She should be monitored for clinical effectiveness of CPAP therapy. 3. She should be advised regarding driving if she operates any automobile. 4. Should avoid sedating medications and alcohol which could worsen her sleep apnea.    5. She would benefit from weight reduction

## 2023-07-13 NOTE — ASSESSMENT & PLAN NOTE
Lab Results   Component Value Date    LDLCALC 61 12/20/2022     Lab Results   Component Value Date    ALT 15 04/28/2023     Lab Results   Component Value Date    CHOLESTEROL 155 12/20/2022    CHOLESTEROL 141 07/20/2022    CHOLESTEROL 104 03/10/2022     Lab Results   Component Value Date    HDL 42 (L) 12/20/2022    HDL 35 (L) 07/20/2022    HDL 30 (L) 03/10/2022     Lab Results   Component Value Date    TRIG 259 (H) 12/20/2022    TRIG 376 (H) 07/20/2022    TRIG 132 03/10/2022     Lab Results   Component Value Date    NONHDLC 113 12/20/2022    [address] 106 07/20/2022

## 2023-07-13 NOTE — ASSESSMENT & PLAN NOTE
On 3 months ago weight was 259. Weight today is 265. She does have a diet chronic edema. Cardiac work-up is underneath. Patient was seen by cardiologist for clearance on April 2023. Echo 3/27/23:  LVEF 50 to 55%. Mild concentric LVH. LVIDD 4.7cm  Normal RV size and systolic function  Trace MR  Estimated PA systolic pressure 39 mmHg     Pharm Nuc Stress 8/26/22: No ischemia. There is a left ventricular perfusion defect that is small in size with mild reduction in uptake present in the anterior and apex location(s) that is fixed. The defect appears to be an artifact caused by breast attenuation. LVEF 70%     Echocardiogram 3/10/22  LVEF: 53%  LVIDd: normal cavity size  RV: normal size and systolic function  MR: none  PASP: inadequate TR envelope for estimation  RVOT: no notching, more parabolic  Other: unable to assess diastolic function, mildly increased wall thickness     Pharm Nuc Stress 7/31/2019:  There was a small, mildly severe, fixed myocardial perfusion defect of the apical apical and anterior wall likely due to attenuation from breast tissue. Echo 5/29/22: LVEF 55%. LVIDD 4.1cm. Mild to moderately increased wall thickness. Grade 1 diastology. Mild MR. Normal RV size and systolic function.      Diet:  2 g sodium diet  2000 mL fluid restriction     Therapeutic  Torsemide 40mg daily  Metoprolol succinate 50mg daily  Jardiance - endo deferring due to renal function and h/o UTIs     Patient denies any chest pain palpitation PND orthopnea. She does have a moderate edema both lower extremity.   Current medications includes Eliquis 5 mg twice a day, torsemide 20 mg 2 tablet equal to 40 mg milligrams daily in the morning straight as well as in the evening she will be taking 20 mg on a as needed basis per the weight scale, metoprolol succinate 50 mg daily,

## 2023-07-13 NOTE — PATIENT INSTRUCTIONS
Medicare Preventive Visit Patient Instructions  Thank you for completing your Welcome to Medicare Visit or Medicare Annual Wellness Visit today. Your next wellness visit will be due in one year (7/13/2024). The screening/preventive services that you may require over the next 5-10 years are detailed below. Some tests may not apply to you based off risk factors and/or age. Screening tests ordered at today's visit but not completed yet may show as past due. Also, please note that scanned in results may not display below. Preventive Screenings:  Service Recommendations Previous Testing/Comments   Colorectal Cancer Screening  * Colonoscopy    * Fecal Occult Blood Test (FOBT)/Fecal Immunochemical Test (FIT)  * Fecal DNA/Cologuard Test  * Flexible Sigmoidoscopy Age: 43-73 years old   Colonoscopy: every 10 years (may be performed more frequently if at higher risk)  OR  FOBT/FIT: every 1 year  OR  Cologuard: every 3 years  OR  Sigmoidoscopy: every 5 years  Screening may be recommended earlier than age 39 if at higher risk for colorectal cancer. Also, an individualized decision between you and your healthcare provider will decide whether screening between the ages of 77-80 would be appropriate. Colonoscopy: 03/07/2022  FOBT/FIT: 03/07/2022  Cologuard: Not on file  Sigmoidoscopy: Not on file          Breast Cancer Screening Age: 36 years old  Frequency: every 1-2 years  Not required if history of left and right mastectomy Mammogram: 03/09/2023        Cervical Cancer Screening Between the ages of 21-29, pap smear recommended once every 3 years. Between the ages of 32-69, can perform pap smear with HPV co-testing every 5 years.    Recommendations may differ for women with a history of total hysterectomy, cervical cancer, or abnormal pap smears in past. Pap Smear: Not on file        Hepatitis C Screening Once for adults born between [address] [place] [address]  More frequently in patients at high risk for Hepatitis C Hep C Antibody: Not on file        Diabetes Screening 1-2 times per year if you're at risk for diabetes or have pre-diabetes Fasting glucose: 288 mg/dL (4/29/2023)  A1C: 8.1 % (3/25/2023)      Cholesterol Screening Once every 5 years if you don't have a lipid disorder. May order more often based on risk factors. Lipid panel: 12/20/2022          Other Preventive Screenings Covered by Medicare:  Abdominal Aortic Aneurysm (AAA) Screening: covered once if your at risk. You're considered to be at risk if you have a family history of AAA. Lung Cancer Screening: covers low dose CT scan once per year if you meet all of the following conditions: (1) Age 48-67; (2) No signs or symptoms of lung cancer; (3) Current smoker or have quit smoking within the last 15 years; (4) You have a tobacco smoking history of at least 20 pack years (packs per day multiplied by number of years you smoked); (5) You get a written order from a healthcare provider. Glaucoma Screening: covered annually if you're considered high risk: (1) You have diabetes OR (2) Family history of glaucoma OR (3)  aged 48 and older OR (3)  American aged 72 and older  Osteoporosis Screening: covered every 2 years if you meet one of the following conditions: (1) You're estrogen deficient and at risk for osteoporosis based off medical history and other findings; (2) Have a vertebral abnormality; (3) On glucocorticoid therapy for more than 3 months; (4) Have primary hyperparathyroidism; (5) On osteoporosis medications and need to assess response to drug therapy. Last bone density test (DXA Scan): 01/03/2023. HIV Screening: covered annually if you're between the age of 14-79. Also covered annually if you are younger than 13 and older than 72 with risk factors for HIV infection. For pregnant patients, it is covered up to 3 times per pregnancy.     Immunizations:  Immunization Recommendations   Influenza Vaccine Annual influenza vaccination during flu season is recommended for all persons aged >= 6 months who do not have contraindications   Pneumococcal Vaccine   * Pneumococcal conjugate vaccine = PCV13 (Prevnar 13), PCV15 (Vaxneuvance), PCV20 (Prevnar 20)  * Pneumococcal polysaccharide vaccine = PPSV23 (Pneumovax) Adults 2364 years old: 1-3 doses may be recommended based on certain risk factors  Adults 72 years old: 1-2 doses may be recommended based off what pneumonia vaccine you previously received   Hepatitis B Vaccine 3 dose series if at intermediate or high risk (ex: diabetes, end stage renal disease, liver disease)   Tetanus (Td) Vaccine - COST NOT COVERED BY MEDICARE PART B Following completion of primary series, a booster dose should be given every 10 years to maintain immunity against tetanus. Td may also be given as tetanus wound prophylaxis. Tdap Vaccine - COST NOT COVERED BY MEDICARE PART B Recommended at least once for all adults. For pregnant patients, recommended with each pregnancy. Shingles Vaccine (Shingrix) - COST NOT COVERED BY MEDICARE PART B  2 shot series recommended in those aged 48 and above     Health Maintenance Due:      Topic Date Due    Hepatitis C Screening  Never done    Colorectal Cancer Screening  03/07/2023    Breast Cancer Screening: Mammogram  01/12/2024    Lung Cancer Screening  Discontinued     Immunizations Due:      Topic Date Due    Pneumococcal Vaccine: 65+ Years (1 - PCV) Never done    COVID-19 Vaccine (2 - Moderna series) 05/06/2022    Influenza Vaccine (1) 09/01/2023     Advance Directives   What are advance directives? Advance directives are legal documents that state your wishes and plans for medical care. These plans are made ahead of time in case you lose your ability to make decisions for yourself. Advance directives can apply to any medical decision, such as the treatments you want, and if you want to donate organs. What are the types of advance directives?   There are many types of advance directives, and each state has rules about how to use them. You may choose a combination of any of the following:  Living will: This is a written record of the treatment you want. You can also choose which treatments you do not want, which to limit, and which to stop at a certain time. This includes surgery, medicine, IV fluid, and tube feedings. Durable power of  for [facility]): This is a written record that states who you want to make healthcare choices for you when you are unable to make them for yourself. This person, called a proxy, is usually a family member or a friend. You may choose more than 1 proxy. Do not resuscitate (DNR) order:  A DNR order is used in case your heart stops beating or you stop breathing. It is a request not to have certain forms of treatment, such as CPR. A DNR order may be included in other types of advance directives. Medical directive: This covers the care that you want if you are in a coma, near death, or unable to make decisions for yourself. You can list the treatments you want for each condition. Treatment may include pain medicine, surgery, blood transfusions, dialysis, IV or tube feedings, and a ventilator (breathing machine). Values history: This document has questions about your views, beliefs, and how you feel and think about life. This information can help others choose the care that you would choose. Why are advance directives important? An advance directive helps you control your care. Although spoken wishes may be used, it is better to have your wishes written down. Spoken wishes can be misunderstood, or not followed. Treatments may be given even if you do not want them. An advance directive may make it easier for your family to make difficult choices about your care.    Weight Management   Why it is important to manage your weight:  Being overweight increases your risk of health conditions such as heart disease, high blood pressure, type 2 diabetes, and certain types of cancer. It can also increase your risk for osteoarthritis, sleep apnea, and other respiratory problems. Aim for a slow, steady weight loss. Even a small amount of weight loss can lower your risk of health problems. How to lose weight safely:  A safe and healthy way to lose weight is to eat fewer calories and get regular exercise. You can lose up about 1 pound a week by decreasing the number of calories you eat by 500 calories each day. Healthy meal plan for weight management:  A healthy meal plan includes a variety of foods, contains fewer calories, and helps you stay healthy. A healthy meal plan includes the following:  Eat whole-grain foods more often. A healthy meal plan should contain fiber. Fiber is the part of grains, fruits, and vegetables that is not broken down by your body. Whole-grain foods are healthy and provide extra fiber in your diet. Some examples of whole-grain foods are whole-wheat breads and pastas, oatmeal, brown rice, and bulgur. Eat a variety of vegetables every day. Include dark, leafy greens such as spinach, kale, isaac greens, and mustard greens. Eat yellow and orange vegetables such as carrots, sweet potatoes, and winter squash. Eat a variety of fruits every day. Choose fresh or canned fruit (canned in its own juice or light syrup) instead of juice. Fruit juice has very little or no fiber. Eat low-fat dairy foods. Drink fat-free (skim) milk or 1% milk. Eat fat-free yogurt and low-fat cottage cheese. Try low-fat cheeses such as mozzarella and other reduced-fat cheeses. Choose meat and other protein foods that are low in fat. Choose beans or other legumes such as split peas or lentils. Choose fish, skinless poultry (chicken or turkey), or lean cuts of red meat (beef or pork). Before you cook meat or poultry, cut off any visible fat. Use less fat and oil. Try baking foods instead of frying them.  Add less fat, such as margarine, sour cream, regular salad dressing and mayonnaise to foods. Eat fewer high-fat foods. Some examples of high-fat foods include french fries, doughnuts, ice cream, and cakes. Eat fewer sweets. Limit foods and drinks that are high in sugar. This includes candy, cookies, regular soda, and sweetened drinks. Exercise:  Exercise at least 30 minutes per day on most days of the week. Some examples of exercise include walking, biking, dancing, and swimming. You can also fit in more physical activity by taking the stairs instead of the elevator or parking farther away from stores. Ask your healthcare provider about the best exercise plan for you. © Copyright [employer] 2018 Information is for End User's use only and may not be sold, redistributed or otherwise used for commercial purposes. All illustrations and images included in CareNotes® are the copyrighted property of [employer]. or [address] [place] [address]    Follow with Consultants as per their and our suggestion    Follow up in 12 week(s) or as needed earlier    Follow all instructions as advised and discussed. Take your medications as prescribed. Call the office immediately if you experience any side effects. Ask questions if you do not understand. Keep your scheduled appointment as advised or come sooner if necessary or in doubt. Best time to call for non-urgent matter or questions on weekdays is between 9am and 12 noon. See physician for any new symptoms or worsening of current symptoms. Urgent or emergent situations call 911 and report to nearest emergency room. I spent  time taking care of this patient including clinical care, conseling, collaboration, chart, lab and consultant's follow up note,images report, documentation, pre visit  review as appropriate.     Patient is to get labs 1 week(s) p

## 2023-07-13 NOTE — PROGRESS NOTES
Assessment and Plan:     Problem List Items Addressed This Visit        Digestive    Gastroesophageal reflux disease     Occasional heartburn she takes OTC Nexium with relief of the symptoms. In the remote past she was on omeprazole but not taking at this time. Endocrine    Type 2 diabetes mellitus with hyperglycemia, without long-term current use of insulin ([address]) - Primary     6/27/2023: Endocrinologist notes were reviewed. Current medication regimen is under 90. Lab Results   Component Value Date    HGBA1C 8.1 (H) 03/25/2023   Toujeo 25 units every 12 hours  Humalog 10u tidac plus 1u/50 above 150mg/dL  Semaglutide 1mg weekly. Relevant Orders    Comprehensive metabolic panel    Hemoglobin A1C    Lipid panel    Albumin / creatinine urine ratio    CBC       Respiratory    Chronic respiratory failure with hypoxia and hypercapnia (HCC) (Chronic)     She will continue with supplemental oxygen via CPAP at night and as needed during the day. Update from 5/27/2022 reviewed    6-14- 2023 pulmonologist office visit reviewed           Moderate persistent asthma without complication     She will continue with Wixela and albuterol via nebulizer as needed. 6-14- 2023 pulmonologist office visit reviewed           Lung nodule     CT scan of the chest: 3/29/2022 reviewed  7/8/2022 CT scan of the chest reviewed 3-month follow-up was recommended  11/29/2022 CT scan of the chest reviewed to new sub-6 mm groundglass nodule in the right upper lobe additional bilateral pulmonary nodules were stable. Previously reported enlarging nodule in the right lower lobe measuring 9 mm was stable. 6-14- 2023 pulmonologist office visit reviewed    3/26/2023 CT scan of the chest abdomen and pelvis no evidence of malignancy or source of sepsis identified stable pulmonary nodule hepatomegaly and diverticulosis      Pulmonologist notes were noted.   They are recommending follow-up CT scan of the chest in April 2024         KATRINA (obstructive sleep apnea)     6-14- 2023 pulmonologist office visit reviewed      3- rec:  he patient should be treated with CPAP for mild obstructive sleep apnea, at 7 cm of water using a ResMed N30 nasal mask size small. Oxygen supplementation of at least 1 L/min is recommended to keep oxygen saturation above 90%. 2. She should be monitored for clinical effectiveness of CPAP therapy. 3. She should be advised regarding driving if she operates any automobile. 4. Should avoid sedating medications and alcohol which could worsen her sleep apnea. 5. She would benefit from weight reduction               Cardiovascular and Mediastinum    Paroxysmal atrial fibrillation (HCC)     On 3 months ago weight was 259. Weight today is 265. She does have a diet chronic edema. Cardiac work-up is underneath. Patient was seen by cardiologist for clearance on April 2023. Echo 3/27/23:  LVEF 50 to 55%. Mild concentric LVH. LVIDD 4.7cm  Normal RV size and systolic function  Trace MR  Estimated PA systolic pressure 39 mmHg     Pharm Nuc Stress 8/26/22: No ischemia. There is a left ventricular perfusion defect that is small in size with mild reduction in uptake present in the anterior and apex location(s) that is fixed. The defect appears to be an artifact caused by breast attenuation. LVEF 70%     Echocardiogram 3/10/22  LVEF: 53%  LVIDd: normal cavity size  RV: normal size and systolic function  MR: none  PASP: inadequate TR envelope for estimation  RVOT: no notching, more parabolic  Other: unable to assess diastolic function, mildly increased wall thickness     Pharm Nuc Stress 7/31/2019:  There was a small, mildly severe, fixed myocardial perfusion defect of the apical apical and anterior wall likely due to attenuation from breast tissue. Echo 5/29/22: LVEF 55%. LVIDD 4.1cm. Mild to moderately increased wall thickness. Grade 1 diastology. Mild MR. Normal RV size and systolic function.    Diet:  2 g sodium diet  2000 mL fluid restriction     Therapeutic  Torsemide 40mg daily  Metoprolol succinate 50mg daily  Jardiance - endo deferring due to renal function and h/o UTIs     Patient denies any chest pain palpitation PND orthopnea. She does have a moderate edema both lower extremity. Current medications includes Eliquis 5 mg twice a day, torsemide 20 mg 2 tablet equal to 40 mg milligrams daily in the morning straight as well as in the evening she will be taking 20 mg on a as needed basis per the weight scale, metoprolol succinate 50 mg daily,         Essential hypertension     Echo 3/27/23:  LVEF 50 to 55%. Mild concentric LVH. LVIDD 4.7cm  Normal RV size and systolic function  Trace MR  Estimated PA systolic pressure 39 mmHg     Pharm Nuc Stress 8/26/22: No ischemia. There is a left ventricular perfusion defect that is small in size with mild reduction in uptake present in the anterior and apex location(s) that is fixed. The defect appears to be an artifact caused by breast attenuation. LVEF 70%     Echocardiogram 3/10/22  LVEF: 53%  LVIDd: normal cavity size  RV: normal size and systolic function  MR: none  PASP: inadequate TR envelope for estimation  RVOT: no notching, more parabolic  Other: unable to assess diastolic function, mildly increased wall thickness     Pharm Nuc Stress 7/31/2019:  There was a small, mildly severe, fixed myocardial perfusion defect of the apical apical and anterior wall likely due to attenuation from breast tissue. Echo 5/29/22: LVEF 55%. LVIDD 4.1cm. Mild to moderately increased wall thickness. Grade 1 diastology. Mild MR. Normal RV size and systolic function.      Diet:  2 g sodium diet  2000 mL fluid restriction     Therapeutic  Torsemide 40mg daily  Metoprolol succinate 50mg daily  Jardiance - endo deferring due to renal function and h/o UTIs     Patient denies any chest pain palpitation PND orthopnea. She does have a moderate edema both lower extremity. Current medications includes Eliquis 5 mg twice a day, torsemide 20 mg 2 tablet equal to 40 mg milligrams daily in the morning straight as well as in the evening she will be taking 20 mg on a as needed basis per the weight scale, metoprolol succinate 50 mg daily,           Relevant Orders    Comprehensive metabolic panel    Hemoglobin A1C    Lipid panel    Albumin / creatinine urine ratio    CBC    Peripheral venous insufficiency    Raynaud's disease    Chronic diastolic heart failure (HCC)     Wt Readings from Last 3 Encounters:   07/13/23 120 kg (265 lb)   06/28/23 118 kg (261 lb)   06/14/23 119 kg (263 lb)       Echo 3/27/23:  LVEF 50 to 55%. Mild concentric LVH. LVIDD 4.7cm  Normal RV size and systolic function  Trace MR  Estimated PA systolic pressure 39 mmHg     Pharm Nuc Stress 8/26/22: No ischemia. There is a left ventricular perfusion defect that is small in size with mild reduction in uptake present in the anterior and apex location(s) that is fixed. The defect appears to be an artifact caused by breast attenuation. LVEF 70%     Echocardiogram 3/10/22  LVEF: 53%  LVIDd: normal cavity size  RV: normal size and systolic function  MR: none  PASP: inadequate TR envelope for estimation  RVOT: no notching, more parabolic  Other: unable to assess diastolic function, mildly increased wall thickness     Pharm Nuc Stress 7/31/2019:  There was a small, mildly severe, fixed myocardial perfusion defect of the apical apical and anterior wall likely due to attenuation from breast tissue. Echo 5/29/22: LVEF 55%. LVIDD 4.1cm. Mild to moderately increased wall thickness. Grade 1 diastology. Mild MR. Normal RV size and systolic function.      Diet:  2 g sodium diet  2000 mL fluid restriction     Therapeutic  Torsemide 40mg daily  Metoprolol succinate 50mg daily  Jardiance - endo deferring due to renal function and h/o UTIs     Patient denies any chest pain palpitation PND orthopnea.   She does have a moderate edema both lower extremity. Current medications includes Eliquis 5 mg twice a day, torsemide 20 mg 2 tablet equal to 40 mg milligrams daily in the morning straight as well as in the evening she will be taking 20 mg on a as needed basis per the weight scale, metoprolol succinate 50 mg daily,             Relevant Orders    Comprehensive metabolic panel    Hemoglobin A1C    Lipid panel    Albumin / creatinine urine ratio    CBC    PSVT (paroxysmal supraventricular tachycardia) ([address])     Echo 3/27/23:  LVEF 50 to 55%. Mild concentric LVH. LVIDD 4.7cm  Normal RV size and systolic function  Trace MR  Estimated PA systolic pressure 39 mmHg     Pharm Nuc Stress 8/26/22: No ischemia. There is a left ventricular perfusion defect that is small in size with mild reduction in uptake present in the anterior and apex location(s) that is fixed. The defect appears to be an artifact caused by breast attenuation. LVEF 70%     Echocardiogram 3/10/22  LVEF: 53%  LVIDd: normal cavity size  RV: normal size and systolic function  MR: none  PASP: inadequate TR envelope for estimation  RVOT: no notching, more parabolic  Other: unable to assess diastolic function, mildly increased wall thickness     Pharm Nuc Stress 7/31/2019:  There was a small, mildly severe, fixed myocardial perfusion defect of the apical apical and anterior wall likely due to attenuation from breast tissue. Echo 5/29/22: LVEF 55%. LVIDD 4.1cm. Mild to moderately increased wall thickness. Grade 1 diastology. Mild MR. Normal RV size and systolic function.      Diet:  2 g sodium diet  2000 mL fluid restriction     Therapeutic  Torsemide 40mg daily  Metoprolol succinate 50mg daily  Jardiance - endo deferring due to renal function and h/o UTIs     Patient denies any chest pain palpitation PND orthopnea. She does have a moderate edema both lower extremity.   Current medications includes Eliquis 5 mg twice a day, torsemide 20 mg 2 tablet equal to 40 mg milligrams daily in the morning straight as well as in the evening she will be taking 20 mg on a as needed basis per the weight scale, metoprolol succinate 50 mg daily,              Genitourinary    Stage 3 chronic kidney disease ([address])     Lab Results   Component Value Date    EGFR 27 04/30/2023    EGFR 30 04/29/2023    EGFR 28 04/28/2023    CREATININE 1.82 (H) 04/30/2023    CREATININE 1.67 (H) 04/29/2023    CREATININE 1.79 (H) 04/28/2023   GFR is baseline  Creat is baseline. Electrolytes stable  Recommend periodic blood test for monitoring of  Renal Function, lytes, GFR and urine for MA/Creat  Avoid Non Steroidal Antiinflammatory such as ibuprofen, aleve etc.    Bone and Mineral disease monitoring as appropriate.     All patient with GFR less than 30( CKD 4 or 5 or 6) are advised to follow with nephrologist.           Relevant Orders    Comprehensive metabolic panel    Albumin / creatinine urine ratio    CBC       Other    Morbid obesity with BMI of 45.0-49.9, adult (HCC) (Chronic)    Lower leg edema     Base line         Mixed hyperlipidemia     Lab Results   Component Value Date    LDLCALC 61 12/20/2022     Lab Results   Component Value Date    ALT 15 04/28/2023     Lab Results   Component Value Date    CHOLESTEROL 155 12/20/2022    CHOLESTEROL 141 07/20/2022    CHOLESTEROL 104 03/10/2022     Lab Results   Component Value Date    HDL 42 (L) 12/20/2022    HDL 35 (L) 07/20/2022    HDL 30 (L) 03/10/2022     Lab Results   Component Value Date    TRIG 259 (H) 12/20/2022    TRIG 376 (H) 07/20/2022    TRIG 132 03/10/2022     Lab Results   Component Value Date    NONHDLC 113 12/20/2022    [address] 106 07/20/2022              Relevant Orders    Comprehensive metabolic panel    Lipid panel    Anemia     Lab Results   Component Value Date    WBC 9.70 04/28/2023    HGB 11.6 04/28/2023    HCT 35.5 04/28/2023    MCV 92 04/28/2023     04/28/2023              Relevant Orders    CBC    S/P total knee replacement, right    Status post reverse total replacement of left shoulder    Mass overlapping multiple quadrants of right breast     6/8/2023: Med oncologist notes as underneath:81year-old female recently found to have right-sided DCIS grade 1-2/3 status post BCS. Mrs. Fiordaliza Vincent feels well, clinically there are no concerning findings. Patient will follow-up with Dr. Igor Cottrell as directed. Patient denies any family history of breast or ovarian cancer.     NCCN guidelines 4.2023 states that for patients with DCIS status post BCS, adjuvant treatments include whole breast irradiation +/- boost.  Radiation oncology has been consulted. Additional risk reduction therapy includes endocrine therapy for 5 years if the patient is ER positive    5/30/2023: Surgical procedure note:  Preop Diagnosis:  Mass overlapping multiple quadrants of right breast [N63.15]     Post-Op Diagnosis Codes:     * Mass overlapping multiple quadrants of right breast [N63.15]     Procedure(s):  Right - RIGHT BREAST KRIS  DIRECTED LUMPECTOMY - KRIS BRACKETTED         Obesity hypoventilation syndrome (HCC)    Platelets decreased ([address])     Lab Results   Component Value Date    WBC 9.70 04/28/2023    HGB 11.6 04/28/2023    HCT 35.5 04/28/2023    MCV 92 04/28/2023     04/28/2023              Relevant Orders    CBC    Impaired mobility and ADLs     She is not using cane or walker at this time         Intraductal papilloma of right breast     6/8/2023: Med oncologist notes as underneath:81year-old female recently found to have right-sided DCIS grade 1-2/3 status post BCS. Mrs. Fiordaliza Vincent feels well, clinically there are no concerning findings. Patient will follow-up with Dr. Igor Cottrell as directed.   Patient denies any family history of breast or ovarian cancer.       Patient has follow-up appointment with on August 2 and final decisions will be made by hematologist at that point  NCCN guidelines 4.2023 states that for patients with DCIS status post BCS, adjuvant treatments include whole breast irradiation +/- boost.  Radiation oncology has been consulted. Additional risk reduction therapy includes endocrine therapy for 5 years if the patient is ER positive    5/30/2023: Surgical procedure note:  Preop Diagnosis:  Mass overlapping multiple quadrants of right breast [N63.15]     Post-Op Diagnosis Codes:     * Mass overlapping multiple quadrants of right breast [N63.15]     Procedure(s):  Right - RIGHT BREAST KRIS  DIRECTED LUMPECTOMY - KRIS BRACKETTED         Ductal carcinoma in situ (DCIS) of right breast     6/8/2023: Med oncologist notes as underneath:81year-old female recently found to have right-sided DCIS grade 1-2/3 status post BCS. Mrs. Ellen Greenberg feels well, clinically there are no concerning findings. Patient will follow-up with Dr. Sharon Gay as directed. Patient denies any family history of breast or ovarian cancer.     NCCN guidelines 4.2023 states that for patients with DCIS status post BCS, adjuvant treatments include whole breast irradiation +/- boost.  Radiation oncology has been consulted. Additional risk reduction therapy includes endocrine therapy for 5 years if the patient is ER positive    5/30/2023: Surgical procedure note:  Preop Diagnosis:  Mass overlapping multiple quadrants of right breast [N63.15]     Post-Op Diagnosis Codes:     * Mass overlapping multiple quadrants of right breast [N63.15]     Procedure(s):  Right - RIGHT BREAST KRIS  DIRECTED LUMPECTOMY - KRIS BRACKETTED    Patient has follow-up appointment with on August 2 and final decisions will be made by hematologist at that point        Other Visit Diagnoses     Medicare annual wellness visit, subsequent              Depression Screening and Follow-up Plan: Patient was screened for depression during today's encounter. They screened negative with a PHQ-2 score of 2.       Preventive health issues were discussed with patient, and age appropriate screening tests were ordered as noted in patient's After Visit Summary. Personalized health advice and appropriate referrals for health education or preventive services given if needed, as noted in patient's After Visit Summary. History of Present Illness:     Patient presents for a Medicare Wellness Visit    Here for chronic disease management. Florencio Hurtado generally seems to be doing fair. Offers no specific complaint. Denies any chest pain palpitation PND orthopnea does have a moderate edema. Arthritis this is fair. No symptoms of LV failure. Underwent successful breast surgery. Final decision awaited. GERD fair takes occasional OTC Nexium. Diabetes due for hemoglobin A1c has a 2-week monitor with kar for further decisions by endocrinologist.  Chronic respiratory failure, moderately severe asthma, pulmonary nodule, obstructive sleep apnea all fair. For follow-up sleep CAT scan of the lung in April 2024 per pulmonologist.  Tolerating CPAP without side effect. Cardiac wise paroxysmal atrial fibrillation pulse regular rate controlled, history of PSVT stable, chronic diastolic heart failure compensated, edema fair, Raynaud's reasonable. Venous stasis fair. Neuropathy fair remains on Lyrica. Kidney stone not a problem. CKD level 3 needs follow-up CMP. BMI and weight noted to 63-60 1-65 they continue to monitor they will titrate diuretic accordingly she remains on straightforward 40 mg torsemide.     DCIS status post procedure procedure mild May 30, 2023 final report from oncology is awaited to have appointment in August 1, 2020         Patient Care Team:  Sandy Capps MD as PCP - General (Family Medicine)  Sandy Capps MD (Family Medicine)  Stephen Jimenez MD as Surgeon (Surgical Oncology)  Artie Cortes MD (Cardiology)  Kimberly Wnyne MD as Medical Oncologist (Hematology and Oncology)  Jolene Liu RN as Nurse Navigator (Oncology)  Felicia Alcala MD (Radiation Oncology)  PATTY Flores as  Care Manager (Oncology)     Review of Systems:     Review of Systems   Constitutional: Negative for chills and fever. HENT: Negative for ear pain, sore throat and trouble swallowing. Eyes: Negative for pain and visual disturbance. Respiratory: Negative for cough and shortness of breath. Cardiovascular: Positive for leg swelling. Negative for chest pain and palpitations. Gastrointestinal: Negative for abdominal pain, constipation, diarrhea and vomiting. Endocrine: Negative for polydipsia. Genitourinary: Negative for difficulty urinating, dysuria, flank pain and hematuria. Musculoskeletal: Positive for gait problem. Negative for arthralgias and back pain. Skin: Negative for rash and wound. Neurological: Negative for seizures, syncope, light-headedness and headaches. Psychiatric/Behavioral: Negative for agitation, behavioral problems and sleep disturbance. The patient is not nervous/anxious. All other systems reviewed and are negative.        Problem List:     Patient Active Problem List   Diagnosis   • Moderate persistent asthma without complication   • Morbid obesity with BMI of 45.0-49.9, adult ([facility])   • Lower leg edema   • Paroxysmal atrial fibrillation ([facility])   • Mixed hyperlipidemia   • Anemia   • Essential hypertension   • PONV (postoperative nausea and vomiting)   • Gastroesophageal reflux disease   • Nephrolithiasis   • Arthritis   • S/P total knee replacement, right   • On continuous oral anticoagulation - eliquis   • Status post reverse total replacement of left shoulder   • Stage 3 chronic kidney disease ([facility])   • Peripheral venous insufficiency   • Raynaud's disease   • Lung nodule   • Chronic diastolic heart failure (HCC)   • Multiple pulmonary nodules determined by computed tomography of lung   • Mass overlapping multiple quadrants of right breast   • Pressure injury of deep tissue of sacral region   • Obesity hypoventilation syndrome ([facility])   • KATRINA (obstructive sleep apnea)   • Chronic respiratory failure with hypoxia and hypercapnia (HCC)   • Platelets decreased (HCC)   • PSVT (paroxysmal supraventricular tachycardia) (HCC)   • Rash   • Impaired mobility and ADLs   • Pressure injury of sacral region, stage 2 (HCC)   • Fall   • Syncope   • Type 2 diabetes mellitus with hyperglycemia, without long-term current use of insulin (HCC)   • Allergies   • Abnormal mammogram   • Intraductal papilloma of right breast   • Ductal carcinoma in situ (DCIS) of right breast      Past Medical and Surgical History:     Past Medical History:   Diagnosis Date   • SEBASTIAN (acute kidney injury) ([address]) 01/23/2023   • Anemia    • Asthma    • Atrial fibrillation (HCC)    • Chronic kidney disease    • COVID-19 January 2022   • Diabetes mellitus ([address])    • GERD (gastroesophageal reflux disease)    • Hyperlipidemia    • Hypertension    • PONV (postoperative nausea and vomiting)    • Sleep apnea     wear BIPAP   • SVT (supraventricular tachycardia) ([address])      Past Surgical History:   Procedure Laterality Date   • APPENDECTOMY     • BREAST BIOPSY Right     years ago when she was 23   • BREAST BIOPSY Right 03/13/2023   • BREAST LUMPECTOMY Right 5/30/2023    Procedure: RIGHT BREAST KRIS  DIRECTED LUMPECTOMY - KRIS Lynda Meyers;  Surgeon: Rosalba Calzada MD;  Location: [facility];  Service: Surgical Oncology   • CYSTOSCOPY W/ LASER LITHOTRIPSY Left 07/12/2016    Procedure: CYSTOSCOPY URETEROSCOPY WITH LITHOTRIPSY HOLMIUM LASER, RETROGRADE PYELOGRAM AND INSERTION STENT URETERAL;  Surgeon: Harry Wilkerson MD;  Location: [facility];  Service:    • DILATION AND CURETTAGE OF UTERUS     • IR THORACENTESIS  03/18/2022   • JOINT REPLACEMENT      right knee   • KNEE ARTHROPLASTY Right    • MAMMO NEEDLE LOCALIZATION LEFT (ALL INC) EACH ADD Right 4/24/2023   • MAMMO STEREOTACTIC BREAST BIOPSY RIGHT (ALL INC) Right 03/13/2023    High Risk Lesion   • FL ARTHROPLASTY GLENOHUMERAL JOINT TOTAL SHOULDER Left 03/01/2022 Procedure: ARTHROPLASTY SHOULDER REVERSE;  Surgeon: Marilyn West MD;  Location: [facility];  Service: Orthopedics   • VA CYSTO BLADDER W/URETERAL CATHETERIZATION Left 2016    Procedure: CYSTOSCOPY RETROGRADE PYELOGRAM WITH INSERTION STENT URETERAL, left;  Surgeon: Ralph Banks MD;  Location: [facility];  Service: Urology   • SHOULDER ARTHROTOMY Left       Family History:     Family History   Problem Relation Age of Onset   • Heart disease Mother    • Diabetes Father    • Thyroid cancer Sister    • Heart disease Brother    • Diabetes Brother    • Heart disease Brother    • Heart disease Brother    • Emphysema Maternal Grandmother    • Heart disease Family       Social History:     Social History     Socioeconomic History   • Marital status: Single     Spouse name: None   • Number of children: 0   • Years of education: 15   • Highest education level: Associate degree: academic program   Occupational History   • None   Tobacco Use   • Smoking status: Former     Packs/day: 1.00     Years: 20.00     Total pack years: 20.00     Types: Cigarettes     Quit date: 1996     Years since quittin.0   • Smokeless tobacco: Never   Vaping Use   • Vaping Use: Never used   Substance and Sexual Activity   • Alcohol use: Not Currently     Comment: rarely   • Drug use: Never   • Sexual activity: Not Currently   Other Topics Concern   • None   Social History Narrative    ** Merged History Encounter **          Social Determinants of Health     Financial Resource Strain: Low Risk  (2023)    Overall Financial Resource Strain (CARDIA)    • Difficulty of Paying Living Expenses: Not hard at all   Food Insecurity: No Food Insecurity (3/28/2023)    Hunger Vital Sign    • Worried About Running Out of Food in the Last Year: Never true    • Ran Out of Food in the Last Year: Never true   Transportation Needs: No Transportation Needs (2023)    PRAPARE - Transportation    • Lack of Transportation (Medical): No    • Lack of Transportation (Non-Medical): No   Physical Activity: Not on file   Stress: Not on file   Social Connections: Not on file   Intimate Partner Violence: Not on file   Housing Stability: Low Risk  (3/28/2023)    Housing Stability Vital Sign    • Unable to Pay for Housing in the Last Year: No    • Number of Places Lived in the Last Year: 2    • Unstable Housing in the Last Year: No      Medications and Allergies:     Current Outpatient Medications   Medication Sig Dispense Refill   • albuterol (PROVENTIL HFA,VENTOLIN HFA) 90 mcg/act inhaler Inhale 2 puffs every 6 (six) hours as needed for wheezing     • ammonium lactate (LAC-HYDRIN) 12 % lotion APPLY TOPICALLY TO AFFECTED AREAS twice a day     • apixaban (ELIQUIS) 5 mg Take 1 tablet (5 mg total) by mouth 2 (two) times a day 60 tablet 2   • B Complex-C-Folic Acid (triphrocaps) 1 MG CAPS Take 1 capsule (1 mg total) by mouth daily 30 capsule 5   • fluticasone (FLONASE) 50 mcg/act nasal spray 1 spray into each nostril daily  0   • Fluticasone-Salmeterol (Wixela Inhub) 250-50 mcg/dose inhaler Inhale 1 puff 2 (two) times a day Rinse mouth after use. 60 blister 3   • glucose blood (OneTouch Verio) test strip Use 1 each 4 (four) times a day Use as instructed 400 strip 1   • insulin glargine (Toujeo SoloStar) 300 units/mL CONCENTRATED U-300 injection pen (1-unit dial) Inject 25 Units under the skin every 12 (twelve) hours     • insulin lispro (HumaLOG KwikPen) 100 units/mL injection pen Use 10 units prior to each meal +1 unit per 50 above 150 mg/dL 15 mL 2   • Insulin Pen Needle (BD Pen Needle Pilar 2nd Gen) 32G X 4 MM MISC For use with insulin pen. Pharmacy may dispense brand covered by insurance.  100 each 0   • Insulin Pen Needle (NovoFine Autocover) 30G X 8 MM MISC Inject under the skin daily 100 each 3   • Insulin Pen Needle 30G X 8 MM MISC 2 (two) times a day     • Lancets (onetouch ultrasoft) lancets Use once daily 100 each 2   • metoprolol succinate (TOPROL-XL) 50 mg 24 hr tablet Take 1 tablet (50 mg total) by mouth 2 (two) times a day 60 tablet 1   • montelukast (SINGULAIR) 10 mg tablet Take 1 tablet (10 mg total) by mouth daily at bedtime 30 tablet 1   • NovoFine Autocover Pen Needle 30G X 8 MM MISC USE TWICE A  each 5   • nystatin (MYCOSTATIN) powder Apply topically 2 (two) times a day 15 g 0   • pregabalin (LYRICA) 100 mg capsule take 1 capsule by mouth twice a day 180 capsule 1   • rosuvastatin (CRESTOR) 10 MG tablet take 1 tablet by mouth once daily 90 tablet 1   • semaglutide, 1 mg/dose, (Ozempic, 1 MG/DOSE,) 4 mg/3 mL injection pen Inject 0.75 mL (1 mg total) under the skin every 7 days 9 mL 1   • torsemide (DEMADEX) 20 mg tablet TAKE 2 TABLETS BY MOUTH IN THE A.M. 180 tablet 1   • Insulin Pen Needle 31G X 8 MM MISC Use daily Inject under the skin (Patient not taking: Reported on 7/13/2023) 100 each 2     No current facility-administered medications for this visit.      Allergies   Allergen Reactions   • Penicillins Hives   • Moxifloxacin Other (See Comments)     unknown   • Oxycodone-Acetaminophen Other (See Comments)     GI upset   • Zinc Acetate Other (See Comments)     unknown   • Penicillins Hives   • Asa [Aspirin] GI Intolerance   • Indocin [Indomethacin] Other (See Comments)     Made patient "loopy"   • Other Other (See Comments)     unknown   • Tannic Acid Rash      Immunizations:     Immunization History   Administered Date(s) Administered   • COVID-19 MODERNA VACC 0.5 ML IM 02/11/2022   • COVID-19 Moderna vac 6-11y or adult booster 50 mcg/0.5 mL 03/11/2022   • Tdap 01/22/2023   • Tuberculin Skin Test 02/20/2022   • Unknown 02/11/2022      Health Maintenance:         Topic Date Due   • Hepatitis C Screening  Never done   • Colorectal Cancer Screening  03/07/2023   • Breast Cancer Screening: Mammogram  01/12/2024   • Lung Cancer Screening  Discontinued         Topic Date Due   • Pneumococcal Vaccine: 65+ Years (1 - PCV) Never done   • COVID-19 Vaccine (2 - Moderna series) 05/06/2022   • Influenza Vaccine (1) 09/01/2023      Medicare Screening Tests and Risk Assessments:     Emily Olivier is here for her Subsequent Wellness visit. Last Medicare Wellness visit information reviewed, patient interviewed and updates made to the record today. Health Risk Assessment:   Patient rates overall health as fair. Patient feels that their physical health rating is same. Patient is satisfied with their life. Eyesight was rated as slightly worse. Hearing was rated as slightly worse. Patient feels that their emotional and mental health rating is same. Patients states they are sometimes angry. Patient states they are sometimes unusually tired/fatigued. Pain experienced in the last 7 days has been none. Patient states that she has experienced no weight loss or gain in last 6 months. Pt states she gained 5 lbs    Fall Risk Screening: In the past year, patient has experienced: no history of falling in past year      Urinary Incontinence Screening:   Patient has not leaked urine accidently in the last six months. At times if she doesn't make it to the bathroom on time    Home Safety:  Patient does not have trouble with stairs inside or outside of their home. Patient has working smoke alarms and has working carbon monoxide detector. Home safety hazards include: none. Some trouble getting upstairs but she use the railing. No home safety hazards    Nutrition:   Current diet is Diabetic, Low Carb and No Added Salt. Medications:   Patient is currently taking over-the-counter supplements. OTC medications include: see medication list. Patient is able to manage medications. No problems with meds    Activities of Daily Living (ADLs)/Instrumental Activities of Daily Living (IADLs):   Walk and transfer into and out of bed and chair?: Yes  Dress and groom yourself?: Yes    Bathe or shower yourself?: Yes    Feed yourself?  Yes  Do your laundry/housekeeping?: Yes  Manage your money, pay your bills and track your expenses?: Yes  Make your own meals?: Yes    Do your own shopping?: Yes    ADL comments: She has some assistance from her daughter    Previous Hospitalizations:   Any hospitalizations or ED visits within the last 12 months?: Yes    How many hospitalizations have you had in the last year?: 3-4    Advance Care Planning:   Living will: No    Durable POA for healthcare: No    Advanced directive: Yes    Advanced directive counseling given: Yes    Five wishes given: No    Patient declined ACP directive: No    End of Life Decisions reviewed with patient: Yes    Provider agrees with end of life decisions: Yes      Cognitive Screening:   Provider or family/friend/caregiver concerned regarding cognition?: No    PREVENTIVE SCREENINGS      Cardiovascular Screening:    General: Screening Not Indicated and History Lipid Disorder      Diabetes Screening:     General: Screening Not Indicated and History Diabetes      Colorectal Cancer Screening:     General: Risks and Benefits Discussed      Breast Cancer Screening:     General: History Breast Cancer      Cervical Cancer Screening:    General: Screening Not Indicated      Osteoporosis Screening:    General: Risks and Benefits Discussed      Abdominal Aortic Aneurysm (AAA) Screening:        General: Risks and Benefits Discussed      Lung Cancer Screening:     General: Screening Not Indicated      Hepatitis C Screening:    General: Risks and Benefits Discussed    Screening, Brief Intervention, and Referral to Treatment (SBIRT)    Screening    Typical number of drinks in a week: 0    Single Item Drug Screening:  How often have you used an illegal drug (including marijuana) or a prescription medication for non-medical reasons in the past year? never    Single Item Drug Screen Score: 0  Interpretation: Negative screen for possible drug use disorder    Brief Intervention  Alcohol & drug use screenings were reviewed.  No concerns regarding substance use disorder identified. Other Counseling Topics:   Skin self-exam and calcium and vitamin D intake. No results found. Physical Exam:     /82   Pulse 83   Ht 5' 2" (1.575 m)   Wt 120 kg (265 lb)   SpO2 94%   BMI 48.47 kg/m²     Physical Exam  Vitals and nursing note reviewed. Constitutional:       Appearance: Normal appearance. She is well-developed. She is obese. She is not ill-appearing. HENT:      Head: Normocephalic and atraumatic. Right Ear: External ear normal.      Left Ear: External ear normal.      Nose: Nose normal.      Mouth/Throat:      Pharynx: Oropharynx is clear. Eyes:      General:         Right eye: No discharge. Left eye: No discharge. Conjunctiva/sclera: Conjunctivae normal.   Cardiovascular:      Rate and Rhythm: Normal rate and regular rhythm. Pulses: Pulses are weak. Dorsalis pedis pulses are 1+ on the right side and 1+ on the left side. Posterior tibial pulses are 1+ on the right side and 1+ on the left side. Heart sounds: Normal heart sounds. No murmur heard. Pulmonary:      Effort: Pulmonary effort is normal. No respiratory distress. Breath sounds: Normal breath sounds. Abdominal:      General: Bowel sounds are normal. There is no distension. Palpations: Abdomen is soft. Tenderness: There is no abdominal tenderness. Musculoskeletal:         General: No swelling. Cervical back: Normal range of motion and neck supple. Right lower le+ Edema present. Left lower le+ Edema present. Right foot: Deformity present. Left foot: Deformity present. Feet:      Right foot:      Skin integrity: No ulcer, skin breakdown, erythema, warmth, callus or dry skin. Toenail Condition: Right toenails are abnormally thick and long. Left foot:      Skin integrity: No ulcer, skin breakdown, erythema, warmth, callus or dry skin.       Toenail Condition: Left toenails are abnormally thick and long. Comments: Bilateral flatfeet present. Deformity of the both feet is present. Skin:     General: Skin is warm and dry. Capillary Refill: Capillary refill takes less than 2 seconds. Coloration: Skin is not jaundiced or pale. Findings: No bruising or rash. Neurological:      General: No focal deficit present. Mental Status: She is alert and oriented to person, place, and time. Sensory: No sensory deficit. Motor: No weakness. Gait: Gait normal.   Psychiatric:         Mood and Affect: Mood normal.         Behavior: Behavior normal.         Cognition and Memory: Memory is impaired. Judgment: Judgment normal.        Diabetic Foot Exam    Patient's shoes and socks removed. Right Foot/Ankle   Right Foot Inspection  Skin Exam: skin normal and skin intact. No dry skin, no warmth, no callus, no erythema, no maceration, no abnormal color, no pre-ulcer, no ulcer and no callus. Toe Exam: ROM and strength within normal limits. no right toe deformity    Sensory   Proprioception: intact  Monofilament testing: diminished    Vascular  Capillary refills: < 3 seconds  The right DP pulse is 1+. The right PT pulse is 1+. Left Foot/Ankle  Left Foot Inspection  Skin Exam: skin normal and skin intact. No dry skin, no warmth, no erythema, no maceration, normal color, no pre-ulcer, no ulcer and no callus. Toe Exam: ROM and strength within normal limits. No left toe deformity. Sensory   Proprioception: intact  Monofilament testing: diminished    Vascular  Capillary refills: < 3 seconds  The left DP pulse is 1+. The left PT pulse is 1+.      Assign Risk Category  Deformity present  Loss of protective sensation  Weak pulses  Risk: 2      Ashley Baltazar MD

## 2023-07-13 NOTE — ASSESSMENT & PLAN NOTE
She will continue with Wixela and albuterol via nebulizer as needed.    6-14- 2023 pulmonologist office visit reviewed

## 2023-07-13 NOTE — ASSESSMENT & PLAN NOTE
She will continue with supplemental oxygen via CPAP at night and as needed during the day.     Update from 5/27/2022 reviewed    6-14- 2023 pulmonologist office visit reviewed

## 2023-07-13 NOTE — ASSESSMENT & PLAN NOTE
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 Mrs. Snehal Sherman feels well, clinically there are no concerning findings. Patient will follow-up with Dr. Paulina Serrano as directed. Patient denies any family history of breast or ovarian cancer.     NCCN guidelines 4.2023 states that for patients with DCIS status post BCS, adjuvant treatments include whole breast irradiation +/- boost.  Radiation oncology has been consulted.   Additional risk reduction therapy includes endocrine therapy for 5 years if the patient is ER positive    5/30/2023: Surgical procedure note:  Preop Diagnosis:  Mass overlapping multiple quadrants of right breast [N63.15]     Post-Op Diagnosis Codes:     * Mass overlapping multiple quadrants of right breast [N63.15]     Procedure(s):  Right - RIGHT BREAST KRIS  DIRECTED LUMPECTOMY - Lucita Perry

## 2023-07-13 NOTE — ASSESSMENT & PLAN NOTE
Lab Results   Component Value Date    WBC 9.70 04/28/2023    HGB 11.6 04/28/2023    HCT 35.5 04/28/2023    MCV 92 04/28/2023     04/28/2023

## 2023-07-13 NOTE — PROGRESS NOTES
Breast Oncology Nurse Navigator    Called patient for initial outreach from nurse navigator. Left voicemail message with contact information. Requested a call back. Second attempt.

## 2023-07-13 NOTE — ASSESSMENT & PLAN NOTE
Lab Results   Component Value Date    EGFR 27 04/30/2023    EGFR 30 04/29/2023    EGFR 28 04/28/2023    CREATININE 1.82 (H) 04/30/2023    CREATININE 1.67 (H) 04/29/2023    CREATININE 1.79 (H) 04/28/2023   GFR is baseline  Creat is baseline. Electrolytes stable  Recommend periodic blood test for monitoring of  Renal Function, lytes, GFR and urine for MA/Creat  Avoid Non Steroidal Antiinflammatory such as ibuprofen, aleve etc.    Bone and Mineral disease monitoring as appropriate.     All patient with GFR less than 30( CKD 4 or 5 or 6) are advised to follow with nephrologist.

## 2023-07-19 ENCOUNTER — RADIATION ONCOLOGY CONSULT (OUTPATIENT)
Dept: RADIATION ONCOLOGY | Facility: HOSPITAL | Age: 73
End: 2023-07-19
Attending: RADIOLOGY
Payer: MEDICARE

## 2023-07-19 VITALS
TEMPERATURE: 97.1 F | BODY MASS INDEX: 47.84 KG/M2 | WEIGHT: 260 LBS | SYSTOLIC BLOOD PRESSURE: 126 MMHG | HEIGHT: 62 IN | OXYGEN SATURATION: 94 % | HEART RATE: 68 BPM | RESPIRATION RATE: 18 BRPM | DIASTOLIC BLOOD PRESSURE: 72 MMHG

## 2023-07-19 DIAGNOSIS — D05.11 DUCTAL CARCINOMA IN SITU (DCIS) OF RIGHT BREAST: Primary | ICD-10-CM

## 2023-07-19 DIAGNOSIS — D05.11 DUCTAL CARCINOMA IN SITU (DCIS) OF RIGHT BREAST: ICD-10-CM

## 2023-07-19 PROCEDURE — 99211 OFF/OP EST MAY X REQ PHY/QHP: CPT | Performed by: RADIOLOGY

## 2023-07-19 PROCEDURE — 99205 OFFICE O/P NEW HI 60 MIN: CPT | Performed by: RADIOLOGY

## 2023-07-19 NOTE — PROGRESS NOTES
Jenae Mai  1950  [id number]    Radiation Oncology Consult    January 12, 2023: Routine screening mammograms showed fine linear calcifications in the outer central right breast at middle depth, BI-RADS Category 0    March 9, 2023: Right breast diagnostic mammogram showing fine linear calcifications in the 9 o'clock position of the right breast at middle depth, BI-RADS Category 4    March 13, 2023: Right breast biopsy showed atypical intraductal papillary proliferation, differential including ductal carcinoma in situ    May 30, 2023: Right breast lumpectomy by Dr. Mirta Fleischer. Pathology showed a few foci of DCIS's, the largest contiguous focus measuring 6 mm, grade 1-2, all margins 5 mm or greater, background intraductal papilloma, estrogen receptor 100%, progesterone receptor 100%    History of present illness: Ms. Csear President is a 53-year-old postmenopausal woman with significant comorbidities including congestive heart failure, paroxysmal atrial fibrillation and supraventricular tachycardia, diabetes and asthma with chronic respiratory failure with shortness of breath at rest, arthritis and hypertension. She had routine screening mammograms in January 2023 that showed an area of linear calcifications in the upper lateral right breast.  Additional diagnostic imaging confirmed the presence of suspicious calcifications and a biopsy on March 13 showed atypical intraductal papillary lesion, possible diagnosis of DCIS. Patient therefore underwent a lumpectomy of the area of calcifications by Dr. Mirta Fleischer on May 30. Pathology did show a few foci of estrogen and progesterone receptor positive ductal carcinoma in situ that was grade 1-2, the largest measuring 6 mm with negative margins of excision. Patient has done well postoperatively with no complications.     Of note over the past 6 months the patient has had multiple visits to the emergency department and admissions for cardiopulmonary comorbidities including CHF exacerbation, pneumonia requiring antibiotics and chronic wheezing with shortness of breath. She is referred for information regarding the potential role of radiation therapy in the setting of breast conservation treatment for ductal carcinoma in situ of the right breast, pathologic stage pTis cN0 cM0, stage 0    Oncology History   Ductal carcinoma in situ (DCIS) of right breast   3/13/2023 Biopsy    A. Breast, Right, right breast calcs. 9:00:  -Fragments of Atypical intraductal papillary proliferation with central hyalinized stromal core and associated calcification. Comment  The differential diagnosis includes  ductal carcinoma in situ /DCIS arising in the background of intraductal papilloma vs. intraductal papilloma with atypical ductal hyperplasia   Further characterization will be performed on the resection specimen     5/30/2023 Initial Diagnosis    Ductal carcinoma in situ (DCIS) of right breast     5/30/2023 Surgery    Right breast bhavik  directed lumpectomy  Ductal carcinoma in situ  Grade 1-2  Few foci of DCIS present, with the largest contiguous focus measuring 6mm  Closest margin: medial (approximately 5mm from DCIS)             5/30/2023 -  Cancer Staged    Staging form: Breast, AJCC 8th Edition  - Pathologic stage from 5/30/2023: Stage 0 (pTis (DCIS), cN0, cM0, G2, ER+, NM+, HER2: Unknown) - Signed by Haylie Durant MD on 7/19/2023  Histopathologic type: Intraductal carcinoma, noninfiltrating, NOS  Stage prefix: Initial diagnosis  Nuclear grade: G2  Multigene prognostic tests performed:  Other  Histologic grading system: 3 grade system  Laterality: Right  Tumor size (mm): 6  Lymph-vascular invasion (LVI): LVI not present (absent)/not identified           Patient Active Problem List   Diagnosis   • Moderate persistent asthma without complication   • Morbid obesity with BMI of 45.0-49.9, adult (HCC)   • Lower leg edema   • Paroxysmal atrial fibrillation (HCC)   • Mixed hyperlipidemia   • Anemia   • Essential hypertension   • PONV (postoperative nausea and vomiting)   • Gastroesophageal reflux disease   • Nephrolithiasis   • Arthritis   • S/P total knee replacement, right   • On continuous oral anticoagulation - eliquis   • Status post reverse total replacement of left shoulder   • Stage 3 chronic kidney disease (HCC)   • Peripheral venous insufficiency   • Raynaud's disease   • Lung nodule   • Chronic diastolic heart failure (HCC)   • Multiple pulmonary nodules determined by computed tomography of lung   • Mass overlapping multiple quadrants of right breast   • Pressure injury of deep tissue of sacral region   • Obesity hypoventilation syndrome (HCC)   • KATRINA (obstructive sleep apnea)   • Chronic respiratory failure with hypoxia and hypercapnia (HCC)   • Platelets decreased (HCC)   • PSVT (paroxysmal supraventricular tachycardia) (HCC)   • Rash   • Impaired mobility and ADLs   • Pressure injury of sacral region, stage 2 ([address])   • Fall   • Syncope   • Type 2 diabetes mellitus with hyperglycemia, without long-term current use of insulin (HCC)   • Allergies   • Abnormal mammogram   • Intraductal papilloma of right breast   • Ductal carcinoma in situ (DCIS) of right breast    Cancer Staging   Ductal carcinoma in situ (DCIS) of right breast  Staging form: Breast, AJCC 8th Edition  - Pathologic stage from 5/30/2023: Stage 0 (pTis (DCIS), cN0, cM0, G2, ER+, RI+, HER2: Unknown) - Signed by Felisha Jo MD on 7/19/2023  Histopathologic type: Intraductal carcinoma, noninfiltrating, NOS  Stage prefix: Initial diagnosis  Nuclear grade: G2  Multigene prognostic tests performed:  Other  Histologic grading system: 3 grade system  Laterality: Right  Tumor size (mm): 6  Lymph-vascular invasion (LVI): LVI not present (absent)/not identified    Past Medical History:   Diagnosis Date   • SEBASTIAN (acute kidney injury) ([address]) 01/23/2023   • Anemia    • Asthma    • Atrial fibrillation ([address])    • Chronic kidney disease    • COVID-19     2022   • Diabetes mellitus ([address] )    • GERD (gastroesophageal reflux disease)    • Hyperlipidemia    • Hypertension    • PONV (postoperative nausea and vomiting)    • Sleep apnea     wear BIPAP   • SVT (supraventricular tachycardia) ([facility])      Social History     Socioeconomic History   • Marital status: Single     Spouse name: Not on file   • Number of children: 0   • Years of education: 14   • Highest education level: Associate degree: academic program   Occupational History   • Not on file   Tobacco Use   • Smoking status: Former     Packs/day: 1.00     Years: 20.00     Total pack years: 20.00     Types: Cigarettes     Quit date: 1996     Years since quittin.0   • Smokeless tobacco: Never   Vaping Use   • Vaping Use: Never used   Substance and Sexual Activity   • Alcohol use: Not Currently     Comment: rarely   • Drug use: Never   • Sexual activity: Not Currently   Other Topics Concern   • Not on file   Social History Narrative    ** Merged History Encounter **          Social Determinants of Health     Financial Resource Strain: Low Risk  (2023)    Overall Financial Resource Strain (CARDIA)    • Difficulty of Paying Living Expenses: Not hard at all   Food Insecurity: No Food Insecurity (3/28/2023)    Hunger Vital Sign    • Worried About Running Out of Food in the Last Year: Never true    • Ran Out of Food in the Last Year: Never true   Transportation Needs: No Transportation Needs (2023)    PRAPARE - Transportation    • Lack of Transportation (Medical): No    • Lack of Transportation (Non-Medical):  No   Physical Activity: Not on file   Stress: Not on file   Social Connections: Not on file   Intimate Partner Violence: Not on file   Housing Stability: Low Risk  (3/28/2023)    Housing Stability Vital Sign    • Unable to Pay for Housing in the Last Year: No    • Number of Places Lived in the Last Year: 2    • Unstable Housing in the Last Year: No      Family History   Problem Relation Age of Onset   • Heart disease Mother    • Diabetes Father    • Thyroid cancer Sister    • Heart disease Brother    • Diabetes Brother    • Heart disease Brother    • Heart disease Brother    • Emphysema Maternal Grandmother    • Heart disease Family      Past Surgical History:   Procedure Laterality Date   • APPENDECTOMY     • BREAST BIOPSY Right     years ago when she was 23   • BREAST BIOPSY Right 03/13/2023   • BREAST LUMPECTOMY Right 5/30/2023    Procedure: RIGHT BREAST KRIS  DIRECTED LUMPECTOMY - Kristen Pulido;  Surgeon: Guera Rankin MD;  Location: [facility];  Service: Surgical Oncology   • CYSTOSCOPY W/ LASER LITHOTRIPSY Left 07/12/2016    Procedure: CYSTOSCOPY URETEROSCOPY WITH LITHOTRIPSY HOLMIUM LASER, RETROGRADE PYELOGRAM AND INSERTION STENT URETERAL;  Surgeon: Geneva Kruse MD;  Location: [facility];  Service:    • DILATION AND CURETTAGE OF UTERUS     • IR THORACENTESIS  03/18/2022   • JOINT REPLACEMENT      right knee   • KNEE ARTHROPLASTY Right    • MAMMO NEEDLE LOCALIZATION LEFT (ALL INC) EACH ADD Right 4/24/2023   • MAMMO STEREOTACTIC BREAST BIOPSY RIGHT (ALL INC) Right 03/13/2023    High Risk Lesion   • IA ARTHROPLASTY GLENOHUMERAL JOINT TOTAL SHOULDER Left 03/01/2022    Procedure: ARTHROPLASTY SHOULDER REVERSE;  Surgeon: Young Hartley MD;  Location: [facility];  Service: Orthopedics   • IA CYSTO BLADDER W/URETERAL CATHETERIZATION Left 06/29/2016    Procedure: CYSTOSCOPY RETROGRADE PYELOGRAM WITH INSERTION STENT URETERAL, left;  Surgeon: Geneva Kruse MD;  Location: [facility];  Service: Urology   • SHOULDER ARTHROTOMY Left        Current Outpatient Medications:   •  albuterol (PROVENTIL HFA,VENTOLIN HFA) 90 mcg/act inhaler, Inhale 2 puffs every 6 (six) hours as needed for wheezing, Disp: , Rfl:   •  ammonium lactate (LAC-HYDRIN) 12 % lotion, APPLY TOPICALLY TO AFFECTED AREAS twice a day, Disp: , Rfl:   •  apixaban (ELIQUIS) 5 mg, Take 1 tablet (5 mg total) by mouth 2 (two) times a day, Disp: 60 tablet, Rfl: 2  •  B Complex-C-Folic Acid (triphrocaps) 1 MG CAPS, Take 1 capsule (1 mg total) by mouth daily, Disp: 30 capsule, Rfl: 5  •  fluticasone (FLONASE) 50 mcg/act nasal spray, 1 spray into each nostril daily, Disp: , Rfl: 0  •  glucose blood (OneTouch Verio) test strip, Use 1 each 4 (four) times a day Use as instructed, Disp: 400 strip, Rfl: 1  •  insulin glargine (Toujeo SoloStar) 300 units/mL CONCENTRATED U-300 injection pen (1-unit dial), Inject 25 Units under the skin every 12 (twelve) hours, Disp: , Rfl:   •  insulin lispro (HumaLOG KwikPen) 100 units/mL injection pen, Use 10 units prior to each meal +1 unit per 50 above 150 mg/dL, Disp: 15 mL, Rfl: 2  •  Insulin Pen Needle (BD Pen Needle Pilar 2nd Gen) 32G X 4 MM MISC, For use with insulin pen.  Pharmacy may dispense brand covered by insurance., Disp: 100 each, Rfl: 0  •  Insulin Pen Needle (NovoFine Autocover) 30G X 8 MM MISC, Inject under the skin daily, Disp: 100 each, Rfl: 3  •  Insulin Pen Needle 30G X 8 MM MISC, 2 (two) times a day, Disp: , Rfl:   •  Lancets (onetouch ultrasoft) lancets, Use once daily, Disp: 100 each, Rfl: 2  •  metoprolol succinate (TOPROL-XL) 50 mg 24 hr tablet, Take 1 tablet (50 mg total) by mouth 2 (two) times a day, Disp: 60 tablet, Rfl: 1  •  montelukast (SINGULAIR) 10 mg tablet, Take 1 tablet (10 mg total) by mouth daily at bedtime, Disp: 30 tablet, Rfl: 1  •  NovoFine Autocover Pen Needle 30G X 8 MM MISC, USE TWICE A DAY, Disp: 300 each, Rfl: 5  •  nystatin (MYCOSTATIN) powder, Apply topically 2 (two) times a day, Disp: 15 g, Rfl: 0  •  pregabalin (LYRICA) 100 mg capsule, take 1 capsule by mouth twice a day, Disp: 180 capsule, Rfl: 1  •  rosuvastatin (CRESTOR) 10 MG tablet, take 1 tablet by mouth once daily, Disp: 90 tablet, Rfl: 1  •  semaglutide, 1 mg/dose, (Ozempic, 1 MG/DOSE,) 4 mg/3 mL injection pen, Inject 0.75 mL (1 mg total) under the skin every 7 days, Disp: 9 mL, Rfl: 1  •  torsemide (DEMADEX) 20 mg tablet, TAKE 2 TABLETS BY MOUTH IN THE A.M., Disp: 180 tablet, Rfl: 1  •  Fluticasone-Salmeterol (Wixela Inhub) 250-50 mcg/dose inhaler, Inhale 1 puff 2 (two) times a day Rinse mouth after use., Disp: 60 blister, Rfl: 3  •  Insulin Pen Needle 31G X 8 MM MISC, Use daily Inject under the skin (Patient not taking: Reported on 2023), Disp: 100 each, Rfl: 2  Allergies   Allergen Reactions   • Penicillins Hives   • Moxifloxacin Other (See Comments)     unknown   • Oxycodone-Acetaminophen Other (See Comments)     GI upset   • Zinc Acetate Other (See Comments)     unknown   • Penicillins Hives   • Asa [Aspirin] GI Intolerance   • Indocin [Indomethacin] Other (See Comments)     Made patient "loopy"   • Other Other (See Comments)     unknown   • Tannic Acid Rash       Review of Systems:  Review of Systems   Constitutional: Positive for activity change, appetite change (Mildy decreased) and fatigue. HENT: Negative. Eyes: Negative. Respiratory: Positive for shortness of breath (Chronic) and wheezing. Cardiovascular: Negative. Gastrointestinal: Negative. Endocrine: Negative. Genitourinary: Negative. Musculoskeletal: Negative. Skin: Negative. Allergic/Immunologic: Negative. Neurological: Negative. Hematological: Negative. Psychiatric/Behavioral: Negative.            OB/GYN History:  The patient underwent menarche at 11 years  Menopause Status Post  No LMP recorded. Patient is postmenopausal.  Menopause at 50 years. Menopause Reason Natural  Hormone replacement therapy: No  Years used 0   0  Para 0  Age at first delivery being 2 years. Nursing: No   Birth control pills: Yes  Years used 2     Pregnancy test needed: No      Physical Exam:  Physical Exam  Vitals and nursing note reviewed. Constitutional:       General: She is not in acute distress. HENT:      Nose: No congestion. Eyes:      General: No scleral icterus. Pupils: Pupils are equal, round, and reactive to light. Pulmonary:      Breath sounds: Wheezing present. Chest:   Breasts:     Right: No swelling, inverted nipple, mass or skin change. Left: Normal.          Comments: Right breast: Well-healed incision in the lower outer quadrant of the right breast measuring 7 cm, residual surgical glue, moderate palpable postoperative seroma, no other suspicious mass  Musculoskeletal:         General: No swelling. Normal range of motion. Lymphadenopathy:      Upper Body:      Right upper body: No supraclavicular or axillary adenopathy. Left upper body: No supraclavicular or axillary adenopathy. Skin:     General: Skin is warm and dry. Neurological:      General: No focal deficit present. Mental Status: She is alert. Mental status is at baseline. Cranial Nerves: No cranial nerve deficit. Sensory: No sensory deficit. Motor: No weakness. Psychiatric:         Mood and Affect: Mood normal.         Thought Content:  Thought content normal.         Judgment: Judgment normal.           LABS:    CBC  Diff   Lab Results   Component Value Date/Time    WBC 9.70 04/28/2023 05:46 PM    HGB 11.6 04/28/2023 05:46 PM    HCT 35.5 04/28/2023 05:46 PM    RBC 3.87 04/28/2023 05:46 PM    MCV 92 04/28/2023 05:46 PM    MCHC 32.7 04/28/2023 05:46 PM    MCH 30.0 04/28/2023 05:46 PM    RDW 14.3 04/28/2023 05:46 PM    MPV 10.6 04/28/2023 05:46 PM    Lab Results   Component Value Date/Time    LYMPHSABS 2.17 04/28/2023 05:46 PM    EOSABS 0.73 (H) 04/28/2023 05:46 PM    MONOSABS 1.07 04/28/2023 05:46 PM    BASOSABS 0.08 04/28/2023 05:46 PM        Basic Metabolic Profile    Lab Results   Component Value Date/Time    SODIUM 136 04/30/2023 05:31 AM    CO2 28 04/30/2023 05:31 AM    Lab Results   Component Value Date/Time    BUN 47 (H) 04/30/2023 05:31 AM    CREATININE 1.82 (H) 04/30/2023 05:31 AM          Discussion/Summary: Ms. Ellen Greenberg is a 79-year-old woman with significant cardiopulmonary comorbidities who had a mammographic abnormality in the lateral right breast, biopsy showing a papillary lesion. Lumpectomy revealed a grade 1-2 ductal carcinoma in situ that is strongly estrogen and progesterone receptor positive with negative margins of excision. She is healed well postoperatively. She is referred for consideration of adjuvant radiation for pathologic stage pTis cN0 cM0, stage 0 right breast cancer. I discussed with the patient and her niece that radiation is used to eradicate microscopic deposits of disease which may reside in the tissue after surgery. Radiation therefore reduces the risk of local recurrence. She appears to have a low risk DCIS based on size, grade and receptor status as well as negative margins of excision. I reviewed the potential treatment options. These would include surgery alone, partial breast radiation, whole breast radiation. I offered to send a Prelude DCISion RT test to ascertain the benefit of radiation. Patient would like to send the assay. Patient asked about the use of endocrine therapy. I recommended against use of tamoxifen given its risk of thrombosis and her underlying cardiopulmonary comorbidities. I also explained that aromatase inhibitors have been studied in a limited way only for DCIS and studies have shown contradictory results with uncertain benefit, particularly when patients also receive breast radiation. Patient will be meeting with Dr. Edwin Meza to discuss this further now that her receptors have been reported. I discussed the procedure involving radiation to the right whole breast or partial breast.  I discussed the potential acute and long-term side effects. Decision RT assay will be ordered today. I have scheduled the patient for follow-up in 2 weeks to review the assay report and make final recommendations regarding the benefit of adjuvant radiation.   Should she opt for radiation treatment, she would be a good candidate for accelerated partial breast irradiation. I spent 60 minutes reviewing the medical record including imaging reports and studies, pathology reports, operative notes, referring physician encounters, reviewing the medical data and treatment recommendations with the patient and her family, performing physical examination.

## 2023-07-19 NOTE — PROGRESS NOTES
Jose Maria Eddei 1950 is a 81 y.o. female who had an abnormal right breast mammogram followed by biopsy showing atypical papillary proliferation high risk lesion, area involved is greater than 3.6 cm, concerning for DCIS. On 5/23/23 patient underwent right breast bhavik directed lumpectomy by Dr. Gina Stanley. Found to have DCIS grade 1-2/3, few foci of DCIS present, with the largest contiguous focus measuring 6 mm. Closest margin: medial (approximately 5 mm from DCIS). % %. Patient is referred by Dr. Antonio Williamson for consideration of RT and presents for initial consult. 3/9/23 - Mammo diagnostic right w cad  FINDINGS:   RIGHT  A) CALCIFICATIONS: There are fine linear or fine-linear branching calcifications in a linear distribution seen in the outer central region of the right breast at 9 o'clock in the middle depth. IMPRESSION:   Suspicious microcalcification for which stereotactic core biopsy sampling is recommended. These findings and recommendations were personally discussed with the patient in conjunction with our nurse navigator. 3/13/23 - Biopsy:  Final Diagnosis:  A. Breast, Right, right breast calcs. 9:00:  -Fragments of Atypical intraductal papillary proliferation with central hyalinized stromal core and associated calcification. Comment  The differential diagnosis includes  ductal carcinoma in situ /DCIS arising in the background of intraductal papilloma vs. intraductal papilloma with atypical ductal hyperplasia   Further characterization will be performed on the resection specimen. 4/6/23 - Surgical Oncology - Dr. Gina Stanley  Atypical intraductal papillary proliferation concerning for DCIS  Plan for Bhavik bracketed lumpectomy    5/30/23 - Surgery - Dr. Gina Stanley  Right breast bhavik  directed lumpectomy   Final Diagnosis:  A. 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  - Few foci of DCIS present, with the largest contiguous focus measuring 6 mm.   - Closest margin: medial (approximately 5 mm from DCIS). - Background intraductal papilloma. See comment.  - Non-invasive lobular neoplasia ([address]). Synoptic Checklist  DCIS OF THE BREAST: Resection (DCIS OF THE BREAST: COMPLETE EXCISION - All Specimens) 8th Edition - Protocol  posted: 3/23/2022  SPECIMEN  Procedure Excision (less than total mastectomy)  Specimen Laterality Right  . TUMOR  Tumor Site Clock position  9 o'clock  Histologic Type Ductal carcinoma in situ  Size (Extent) of DCIS Estimated size (extent) of DCIS is at least ANITHA COM HSPTL): 6 mm  Architectural Patterns Cribriform  Papillary  Solid  Nuclear Grade Grade II (intermediate)  Necrosis Present, central (expansive "comedo" necrosis)  Microcalcifications  Present in DCIS  MARGINS  Margin Status All margins negative for DCIS  Distance from DCIS to Closest Margin Greater than: 3 mm  . REGIONAL LYMPH NODES  Regional Lymph Node Status Not applicable (no regional lymph nodes submitted or found)  . PATHOLOGIC STAGE CLASSIFICATION (pTNM, AJCC 8th Edition)  Reporting of pT, pN, and (when applicable) pM categories is based on information available to the pathologist at the time the  report is issued. As per the AJCC (Chapter 1, 8th Ed.) it is the managing physician’s responsibility to establish the final pathologic  stage based upon all pertinent information, including but potentially not limited to this pathology report. pT Category pTis (DCIS)  pN Category pN not assigned (no nodes submitted or found)  .   ADDITIONAL FINDINGS  Additional Findings Proliferative fibrocystic change and intraductal papilloma    Specimens  A Breast, Right, Right Breast Evelyn Directed Lumpectomy Suture Cox Short Superior Long Lateral  .  Addendum  Test Description Result Prognostic Interpretation  Estrogen Receptor (clone SP-1) 100% Positive  Internal control: Positive Staining Intensity: Strong  External control: Positive Danny Score*: 8  Progesterone Receptor (clone 1E2) 100% Positive  Internal control: Positive Staining Intensity: Strong  External control: Positive Danny Score*: 8    23 - Hematology Oncology - Dr. Alvin Tran  Right-sided DCIS grade 1-2/3 status post BCS  Refer to radiation oncology  Additional risk reduction therapy includes endocrine therapy for 5 years if the patient is ER positive. Estrogen receptor results are still pending. Once available, patient will be contacted. 23 - Surgical Oncology - Dr. Chang Corporal  Right breast incision well-healed  Estrogen and progesterone positive, will refer her to medical and radiation oncologist  F/u in 6 months    Upcomin23 - Hematology Oncology  23 - Surgical Oncology       Oncology History   Ductal carcinoma in situ (DCIS) of right breast   3/13/2023 Biopsy    A. Breast, Right, right breast calcs. 9:00:  -Fragments of Atypical intraductal papillary proliferation with central hyalinized stromal core and associated calcification. Comment  The differential diagnosis includes  ductal carcinoma in situ /DCIS arising in the background of intraductal papilloma vs. intraductal papilloma with atypical ductal hyperplasia   Further characterization will be performed on the resection specimen     2023 Initial Diagnosis    Ductal carcinoma in situ (DCIS) of right breast     2023 Surgery    Right breast bhavik  directed lumpectomy  Ductal carcinoma in situ  Grade 1-2  Few foci of DCIS present, with the largest contiguous focus measuring 6mm  Closest margin: medial (approximately 5mm from DCIS)                 Review of Systems:  Review of Systems   Constitutional: Positive for activity change, appetite change (Mildy decreased) and fatigue. HENT: Negative. Eyes: Negative. Respiratory: Positive for shortness of breath (Chronic) and wheezing. Cardiovascular: Negative. Gastrointestinal: Negative. Endocrine: Negative. Genitourinary: Negative. Musculoskeletal: Negative.     Skin: Negative. Allergic/Immunologic: Negative. Neurological: Negative. Hematological: Negative. Psychiatric/Behavioral: Negative. Clinical Trial: no    OB/GYN History:  The patient underwent menarche at 11 years  Menopause Status Post  No LMP recorded. Patient is postmenopausal.  Menopause at 50 years. Menopause Reason Natural  Hormone replacement therapy: No  Years used 0   0  Para 0  Age at first delivery being 2 years.    Nursing: No   Birth control pills: Yes  Years used 2    Pregnancy test needed: No    ONCOTYPE/MAMMOPRINT results: n/a    Prior Radiation: No    Teaching: [facility] radiation booklet    MST: Completed    Implantable Devices (Port, Pacemaker, pain stimulator): No    Hip Replacement: No        Health Maintenance   Topic Date Due   • Hepatitis C Screening  Never done   • Pneumococcal Vaccine: 65+ Years (1 - PCV) Never done   • DM Eye Exam  Never done   • COVID-19 Vaccine (2 - Moderna series) 2022   • Colorectal Cancer Screening  2023   • Influenza Vaccine (1) 2023   • HEMOGLOBIN A1C  2023   • Breast Cancer Screening: Mammogram  2024   • Fall Risk  2024   • Depression Screening  2024   • Urinary Incontinence Screening  2024   • Medicare Annual Wellness Visit (AWV)  2024   • BMI: Adult  2024   • Diabetic Foot Exam  2024   • Osteoporosis Screening  Completed   • HIB Vaccine  Aged Out   • IPV Vaccine  Aged Out   • Hepatitis A Vaccine  Aged Out   • Meningococcal ACWY Vaccine  Aged Out   • HPV Vaccine  Aged Out   • Lung Cancer Screening  Discontinued     Past Medical History:   Diagnosis Date   • SEBASTIAN (acute kidney injury) ([address]) 2023   • Anemia    • Asthma    • Atrial fibrillation ([address])    • Chronic kidney disease    • COVID-19     2022   • Diabetes mellitus ([address])    • GERD (gastroesophageal reflux disease)    • Hyperlipidemia    • Hypertension    • PONV (postoperative nausea and vomiting)    • Sleep apnea wear BIPAP   • SVT (supraventricular tachycardia) (HCC)      Past Surgical History:   Procedure Laterality Date   • APPENDECTOMY     • BREAST BIOPSY Right     years ago when she was 23   • BREAST BIOPSY Right 2023   • BREAST LUMPECTOMY Right 2023    Procedure: RIGHT BREAST KRIS  DIRECTED LUMPECTOMY - Mammbarb Schultz;  Surgeon: Ousmane De Santiago MD;  Location: [facility];  Service: Surgical Oncology   • CYSTOSCOPY W/ LASER LITHOTRIPSY Left 2016    Procedure: CYSTOSCOPY URETEROSCOPY WITH LITHOTRIPSY HOLMIUM LASER, RETROGRADE PYELOGRAM AND INSERTION STENT URETERAL;  Surgeon: Celso Nunez MD;  Location: [facility];  Service:    • DILATION AND CURETTAGE OF UTERUS     • IR THORACENTESIS  2022   • JOINT REPLACEMENT      right knee   • KNEE ARTHROPLASTY Right    • MAMMO NEEDLE LOCALIZATION LEFT (ALL INC) EACH ADD Right 2023   • MAMMO STEREOTACTIC BREAST BIOPSY RIGHT (ALL INC) Right 2023    High Risk Lesion   • VA ARTHROPLASTY GLENOHUMERAL JOINT TOTAL SHOULDER Left 2022    Procedure: ARTHROPLASTY SHOULDER REVERSE;  Surgeon: Orlando Handley MD;  Location: [facility] OR;  Service: Orthopedics   • VA CYSTO BLADDER W/URETERAL CATHETERIZATION Left 2016    Procedure: CYSTOSCOPY RETROGRADE PYELOGRAM WITH INSERTION STENT URETERAL, left;  Surgeon: Celso Nunez MD;  Location: [facility];  Service: Urology   • SHOULDER ARTHROTOMY Left      Family History   Problem Relation Age of Onset   • Heart disease Mother    • Diabetes Father    • Thyroid cancer Sister    • Heart disease Brother    • Diabetes Brother    • Heart disease Brother    • Heart disease Brother    • Emphysema Maternal Grandmother    • Heart disease Family      Social History     Tobacco Use   • Smoking status: Former     Packs/day: 1.00     Years: 20.00     Total pack years: 20.00     Types: Cigarettes     Quit date: 1996     Years since quittin.0   • Smokeless tobacco: Never   Vaping Use   • Vaping Use: Never used   Substance Use Topics   • Alcohol use: Not Currently     Comment: rarely   • Drug use: Never        Current Outpatient Medications:   •  albuterol (PROVENTIL HFA,VENTOLIN HFA) 90 mcg/act inhaler, Inhale 2 puffs every 6 (six) hours as needed for wheezing, Disp: , Rfl:   •  ammonium lactate (LAC-HYDRIN) 12 % lotion, APPLY TOPICALLY TO AFFECTED AREAS twice a day, Disp: , Rfl:   •  apixaban (ELIQUIS) 5 mg, Take 1 tablet (5 mg total) by mouth 2 (two) times a day, Disp: 60 tablet, Rfl: 2  •  B Complex-C-Folic Acid (triphrocaps) 1 MG CAPS, Take 1 capsule (1 mg total) by mouth daily, Disp: 30 capsule, Rfl: 5  •  fluticasone (FLONASE) 50 mcg/act nasal spray, 1 spray into each nostril daily, Disp: , Rfl: 0  •  Fluticasone-Salmeterol (Wixela Inhub) 250-50 mcg/dose inhaler, Inhale 1 puff 2 (two) times a day Rinse mouth after use., Disp: 60 blister, Rfl: 3  •  glucose blood (OneTouch Verio) test strip, Use 1 each 4 (four) times a day Use as instructed, Disp: 400 strip, Rfl: 1  •  insulin glargine (Toujeo SoloStar) 300 units/mL CONCENTRATED U-300 injection pen (1-unit dial), Inject 25 Units under the skin every 12 (twelve) hours, Disp: , Rfl:   •  insulin lispro (HumaLOG KwikPen) 100 units/mL injection pen, Use 10 units prior to each meal +1 unit per 50 above 150 mg/dL, Disp: 15 mL, Rfl: 2  •  Insulin Pen Needle (BD Pen Needle Pilar 2nd Gen) 32G X 4 MM MISC, For use with insulin pen.  Pharmacy may dispense brand covered by insurance., Disp: 100 each, Rfl: 0  •  Insulin Pen Needle (NovoFine Autocover) 30G X 8 MM MISC, Inject under the skin daily, Disp: 100 each, Rfl: 3  •  Insulin Pen Needle 30G X 8 MM MISC, 2 (two) times a day, Disp: , Rfl:   •  Insulin Pen Needle 31G X 8 MM MISC, Use daily Inject under the skin (Patient not taking: Reported on 7/13/2023), Disp: 100 each, Rfl: 2  •  Lancets (onetouch ultrasoft) lancets, Use once daily, Disp: 100 each, Rfl: 2  •  metoprolol succinate (TOPROL-XL) 50 mg 24 hr tablet, Take 1 tablet (50 mg total) by mouth 2 (two) times a day, Disp: 60 tablet, Rfl: 1  •  montelukast (SINGULAIR) 10 mg tablet, Take 1 tablet (10 mg total) by mouth daily at bedtime, Disp: 30 tablet, Rfl: 1  •  NovoFine Autocover Pen Needle 30G X 8 MM MISC, USE TWICE A DAY, Disp: 300 each, Rfl: 5  •  nystatin (MYCOSTATIN) powder, Apply topically 2 (two) times a day, Disp: 15 g, Rfl: 0  •  pregabalin (LYRICA) 100 mg capsule, take 1 capsule by mouth twice a day, Disp: 180 capsule, Rfl: 1  •  rosuvastatin (CRESTOR) 10 MG tablet, take 1 tablet by mouth once daily, Disp: 90 tablet, Rfl: 1  •  semaglutide, 1 mg/dose, (Ozempic, 1 MG/DOSE,) 4 mg/3 mL injection pen, Inject 0.75 mL (1 mg total) under the skin every 7 days, Disp: 9 mL, Rfl: 1  •  torsemide (DEMADEX) 20 mg tablet, TAKE 2 TABLETS BY MOUTH IN THE A.M., Disp: 180 tablet, Rfl: 1  Allergies   Allergen Reactions   • Penicillins Hives   • Moxifloxacin Other (See Comments)     unknown   • Oxycodone-Acetaminophen Other (See Comments)     GI upset   • Zinc Acetate Other (See Comments)     unknown   • Penicillins Hives   • Asa [Aspirin] GI Intolerance   • Indocin [Indomethacin] Other (See Comments)     Made patient "loopy"   • Other Other (See Comments)     unknown   • Tannic Acid Rash      There were no vitals filed for this visit.

## 2023-07-21 DIAGNOSIS — E11.65 TYPE 2 DIABETES MELLITUS WITH HYPERGLYCEMIA, WITHOUT LONG-TERM CURRENT USE OF INSULIN (HCC): Primary | ICD-10-CM

## 2023-07-21 RX ORDER — INSULIN GLARGINE 300 U/ML
INJECTION, SOLUTION SUBCUTANEOUS
Qty: 4.5 ML | Refills: 2 | Status: SHIPPED | OUTPATIENT
Start: 2023-07-21

## 2023-07-23 DIAGNOSIS — I48.0 PAROXYSMAL ATRIAL FIBRILLATION (HCC): ICD-10-CM

## 2023-07-23 DIAGNOSIS — J45.41 MODERATE PERSISTENT ASTHMA WITH ACUTE EXACERBATION: ICD-10-CM

## 2023-07-24 RX ORDER — MONTELUKAST SODIUM 10 MG/1
10 TABLET ORAL
Qty: 30 TABLET | Refills: 1 | Status: SHIPPED | OUTPATIENT
Start: 2023-07-24

## 2023-07-24 RX ORDER — METOPROLOL SUCCINATE 50 MG/1
50 TABLET, EXTENDED RELEASE ORAL 2 TIMES DAILY
Qty: 60 TABLET | Refills: 1 | Status: SHIPPED | OUTPATIENT
Start: 2023-07-24

## 2023-07-25 ENCOUNTER — CLINICAL SUPPORT (OUTPATIENT)
Dept: ENDOCRINOLOGY | Facility: CLINIC | Age: 73
End: 2023-07-25
Payer: MEDICARE

## 2023-07-25 DIAGNOSIS — E11.65 TYPE 2 DIABETES MELLITUS WITH HYPERGLYCEMIA, WITHOUT LONG-TERM CURRENT USE OF INSULIN (HCC): ICD-10-CM

## 2023-07-25 PROCEDURE — 95251 CONT GLUC MNTR ANALYSIS I&R: CPT | Performed by: INTERNAL MEDICINE

## 2023-07-26 ENCOUNTER — OFFICE VISIT (OUTPATIENT)
Dept: URGENT CARE | Facility: CLINIC | Age: 73
End: 2023-07-26
Payer: MEDICARE

## 2023-07-26 VITALS
OXYGEN SATURATION: 96 % | TEMPERATURE: 98.5 F | BODY MASS INDEX: 47.92 KG/M2 | RESPIRATION RATE: 14 BRPM | WEIGHT: 262 LBS | HEART RATE: 72 BPM

## 2023-07-26 DIAGNOSIS — M10.372 ACUTE GOUT DUE TO RENAL IMPAIRMENT INVOLVING TOE OF LEFT FOOT: Primary | ICD-10-CM

## 2023-07-26 PROCEDURE — 99213 OFFICE O/P EST LOW 20 MIN: CPT | Performed by: FAMILY MEDICINE

## 2023-07-26 RX ORDER — PREDNISONE 20 MG/1
40 TABLET ORAL DAILY
Qty: 10 TABLET | Refills: 0 | Status: SHIPPED | OUTPATIENT
Start: 2023-07-26 | End: 2023-07-31

## 2023-07-26 RX ORDER — PANTOPRAZOLE SODIUM 40 MG/1
40 TABLET, DELAYED RELEASE ORAL DAILY
Qty: 30 TABLET | Refills: 0 | Status: SHIPPED | OUTPATIENT
Start: 2023-07-26

## 2023-07-26 NOTE — PROGRESS NOTES
Continous glucose monitoring kar intrepretation     Date/Time 7/25/2023 3:45 PM     Performed by  Joshua Salcedo   Authorized by Coco Chowdhury MD     Universal Protocol   Consent: Verbal consent obtained. Written consent obtained. Consent given by: patient  Timeout called at: 7/25/2023 4:00 PM.  Patient understanding: patient states understanding of the procedure being performed  Patient consent: the patient's understanding of the procedure matches consent given  Procedure consent: procedure consent matches procedure scheduled  Relevant documents: relevant documents present and verified  Test results: test results available and properly labeled  Site marked: the operative site was not marked  Radiology Images displayed and confirmed.  If images not available, report reviewed: imaging studies not available  Patient identity confirmed: verbally with patient        Local anesthesia used: no     Anesthesia   Local anesthesia used: no     Sedation   Patient sedated: no        Specimen: no    Culture: no   Procedure Details   Procedure Notes: 11 days data    Patient tolerance: patient tolerated the procedure well with no immediate complications

## 2023-07-26 NOTE — PROGRESS NOTES
[facility]    NAME: Kiersten Patterson is a 72 y.o. female  : 1950    MRN: [id number]  DATE: 2023  TIME: 4:42 PM    Assessment and Plan   Acute gout due to renal impairment involving toe of left foot [M10.372]  1. Acute gout due to renal impairment involving toe of left foot  predniSONE 20 mg tablet    pantoprazole (PROTONIX) 40 mg tablet        Due to current renal function, to start a trial of prednisone 40 mg once daily for 5 days in addition to Protonix  Provided patient with precautionary measures    Patient Instructions   There are no Patient Instructions on file for this visit. Follow up with PCP in 3-5 days. Proceed to ER if symptoms worsen. Chief Complaint     Chief Complaint   Patient presents with   • Pain     Left big toe pain that started 2-3 days ago// swelling and redness noted at site     History of Present Illness   Toe Pain   The incident occurred 3 to 5 days ago. There was no injury mechanism. Pain location: Left big toe. The quality of the pain is described as burning and aching. The pain is moderate. Pertinent negatives include no inability to bear weight, loss of motion, loss of sensation, muscle weakness, numbness or tingling. She reports no foreign bodies present. The symptoms are aggravated by movement, palpation and weight bearing. She has tried nothing for the symptoms. Review of Systems   Review of Systems   Neurological: Negative for tingling and numbness. All other systems reviewed and are negative. The following portions of the patient's history were reviewed and updated as appropriate: allergies, current medications, past family history, past medical history, past social history, past surgical history and problem list.     Medications have been verified. Objective   Pulse 72   Temp 98.5 °F (36.9 °C)   Resp 14   Wt 119 kg (262 lb)   SpO2 96%   BMI 47.92 kg/m²     Physical Exam  Constitutional:       General: She is not in acute distress. Appearance: Normal appearance. She is not ill-appearing, toxic-appearing or diaphoretic. Musculoskeletal:      Comments: Significant erythema, swelling and tenderness over the left big toe  No discrete fluctuation or tracking marks   Neurological:      Mental Status: She is alert.        Maurice Epley, MD

## 2023-07-31 ENCOUNTER — PATIENT OUTREACH (OUTPATIENT)
Dept: HEMATOLOGY ONCOLOGY | Facility: CLINIC | Age: 73
End: 2023-07-31

## 2023-07-31 NOTE — PROGRESS NOTES
Breast Oncology Nurse Navigator    Called patient for initial outreach from nurse navigator. Introduced myself and my role. Third attempt. Patient states her family is over and they just returned from vacation. Offered to call her a different day and patient agreed. Confirmed upcoming appointments with medical oncology and radiation oncology this week on 8/2/23. Will attempt to call patient back later this week.

## 2023-08-01 ENCOUNTER — OFFICE VISIT (OUTPATIENT)
Dept: ENDOCRINOLOGY | Facility: CLINIC | Age: 73
End: 2023-08-01
Payer: MEDICARE

## 2023-08-01 ENCOUNTER — TREATMENT (OUTPATIENT)
Dept: ENDOCRINOLOGY | Facility: CLINIC | Age: 73
End: 2023-08-01
Payer: MEDICARE

## 2023-08-01 ENCOUNTER — LAB REQUISITION (OUTPATIENT)
Dept: LAB | Facility: HOSPITAL | Age: 73
End: 2023-08-01

## 2023-08-01 DIAGNOSIS — E11.65 TYPE 2 DIABETES MELLITUS WITH HYPERGLYCEMIA, WITHOUT LONG-TERM CURRENT USE OF INSULIN (HCC): Primary | ICD-10-CM

## 2023-08-01 DIAGNOSIS — E11.65 TYPE 2 DIABETES MELLITUS WITH HYPERGLYCEMIA, WITHOUT LONG-TERM CURRENT USE OF INSULIN (HCC): ICD-10-CM

## 2023-08-01 DIAGNOSIS — N63.15 UNSPECIFIED LUMP IN THE RIGHT BREAST, OVERLAPPING QUADRANTS: ICD-10-CM

## 2023-08-01 LAB — SCAN RESULT: NORMAL

## 2023-08-01 PROCEDURE — 95251 CONT GLUC MNTR ANALYSIS I&R: CPT | Performed by: INTERNAL MEDICINE

## 2023-08-01 NOTE — PROGRESS NOTES
CGM data review[de-identified]  Device: kar pro Dates: 7/11/23-7/22/23 Usage: 100 % Av glu: 220 mg/dL  SD:  mg/dL CV: 22  % GMI: 8.6  %  TIR: 22 %  TAR: 51+27 %  TBR: 0 %    Glycemic patters:  Fasting and postprandial hyperglycemia. Hypoglycemia: No    Recommendation:  Use Toujeo 30u Q12H, Humalog 15u tidac +1u/50 above 150mg/dL. Continue semaglutide 1mg weekly.

## 2023-08-02 ENCOUNTER — OFFICE VISIT (OUTPATIENT)
Dept: HEMATOLOGY ONCOLOGY | Facility: MEDICAL CENTER | Age: 73
End: 2023-08-02
Payer: MEDICARE

## 2023-08-02 ENCOUNTER — RADIATION ONCOLOGY FOLLOW-UP (OUTPATIENT)
Dept: RADIATION ONCOLOGY | Facility: HOSPITAL | Age: 73
End: 2023-08-02
Attending: RADIOLOGY
Payer: MEDICARE

## 2023-08-02 VITALS
RESPIRATION RATE: 18 BRPM | OXYGEN SATURATION: 97 % | TEMPERATURE: 97.9 F | DIASTOLIC BLOOD PRESSURE: 88 MMHG | HEART RATE: 79 BPM | BODY MASS INDEX: 47.92 KG/M2 | HEIGHT: 62 IN | SYSTOLIC BLOOD PRESSURE: 130 MMHG

## 2023-08-02 VITALS
OXYGEN SATURATION: 92 % | WEIGHT: 263.2 LBS | HEIGHT: 62 IN | HEART RATE: 63 BPM | BODY MASS INDEX: 48.44 KG/M2 | DIASTOLIC BLOOD PRESSURE: 66 MMHG | RESPIRATION RATE: 20 BRPM | SYSTOLIC BLOOD PRESSURE: 126 MMHG | TEMPERATURE: 97.2 F

## 2023-08-02 DIAGNOSIS — D05.11 DUCTAL CARCINOMA IN SITU (DCIS) OF RIGHT BREAST: ICD-10-CM

## 2023-08-02 DIAGNOSIS — D05.11 DUCTAL CARCINOMA IN SITU (DCIS) OF RIGHT BREAST: Primary | ICD-10-CM

## 2023-08-02 DIAGNOSIS — E66.01 MORBID OBESITY WITH BMI OF 45.0-49.9, ADULT (HCC): Primary | Chronic | ICD-10-CM

## 2023-08-02 PROCEDURE — 99213 OFFICE O/P EST LOW 20 MIN: CPT | Performed by: RADIOLOGY

## 2023-08-02 PROCEDURE — 99211 OFF/OP EST MAY X REQ PHY/QHP: CPT | Performed by: RADIOLOGY

## 2023-08-02 PROCEDURE — 99214 OFFICE O/P EST MOD 30 MIN: CPT | Performed by: INTERNAL MEDICINE

## 2023-08-02 NOTE — PROGRESS NOTES
Shanda Bloodgood 1950 is a 72 y.o. female  with DCIS of the right breast. She was seen in consult 23. She returns today for follow-up. DCISion RT - 3.7      Upcomin23 Dr. Ac Nuñez med onc  23 Dr. Raj Bose surg onc      Oncology History   Ductal carcinoma in situ (DCIS) of right breast   3/13/2023 Biopsy    A. Breast, Right, right breast calcs. 9:00:  -Fragments of Atypical intraductal papillary proliferation with central hyalinized stromal core and associated calcification. Comment  The differential diagnosis includes  ductal carcinoma in situ /DCIS arising in the background of intraductal papilloma vs. intraductal papilloma with atypical ductal hyperplasia   Further characterization will be performed on the resection specimen     2023 Initial Diagnosis    Ductal carcinoma in situ (DCIS) of right breast     2023 Surgery    Right breast bhavik  directed lumpectomy  Ductal carcinoma in situ  Grade 1-2  Few foci of DCIS present, with the largest contiguous focus measuring 6mm  Closest margin: medial (approximately 5mm from DCIS)             2023 -  Cancer Staged    Staging form: Breast, AJCC 8th Edition  - Pathologic stage from 2023: Stage 0 (pTis (DCIS), cN0, cM0, G2, ER+, NH+, HER2: Unknown) - Signed by Martine Antunez MD on 2023  Histopathologic type: Intraductal carcinoma, noninfiltrating, NOS  Stage prefix: Initial diagnosis  Nuclear grade: G2  Multigene prognostic tests performed: Other  Histologic grading system: 3 grade system  Laterality: Right  Tumor size (mm): 6  Lymph-vascular invasion (LVI): LVI not present (absent)/not identified           Review of Systems:  Review of Systems   Constitutional: Negative. HENT: Negative. Eyes: Negative. Respiratory: Negative. Cardiovascular: Negative. Gastrointestinal: Negative. Endocrine: Negative. Genitourinary: Negative. Musculoskeletal: Negative.     Skin:        Open area to bilateral buttocks and LLE   Allergic/Immunologic: Negative. Neurological: Negative. Hematological: Negative. Psychiatric/Behavioral: Negative.         Clinical Trial: no    Health Maintenance   Topic Date Due   • Hepatitis C Screening  Never done   • Pneumococcal Vaccine: 65+ Years (1 - PCV) Never done   • DM Eye Exam  Never done   • COVID-19 Vaccine (2 - Moderna series) 05/06/2022   • Colorectal Cancer Screening  03/07/2023   • Influenza Vaccine (1) 09/01/2023   • HEMOGLOBIN A1C  09/25/2023   • Breast Cancer Screening: Mammogram  01/12/2024   • Fall Risk  07/13/2024   • Urinary Incontinence Screening  07/13/2024   • Medicare Annual Wellness Visit (AWV)  07/13/2024   • Diabetic Foot Exam  07/13/2024   • BMI: Adult  07/26/2024   • Depression Screening  08/02/2024   • Osteoporosis Screening  Completed   • HIB Vaccine  Aged Out   • IPV Vaccine  Aged Out   • Hepatitis A Vaccine  Aged Out   • Meningococcal ACWY Vaccine  Aged Out   • HPV Vaccine  Aged Out   • Lung Cancer Screening  Discontinued     Patient Active Problem List   Diagnosis   • Moderate persistent asthma without complication   • Morbid obesity with BMI of 45.0-49.9, adult ([address])   • Lower leg edema   • Paroxysmal atrial fibrillation ([address])   • Mixed hyperlipidemia   • Anemia   • Essential hypertension   • PONV (postoperative nausea and vomiting)   • Gastroesophageal reflux disease   • Nephrolithiasis   • Arthritis   • S/P total knee replacement, right   • On continuous oral anticoagulation - eliquis   • Status post reverse total replacement of left shoulder   • Stage 3 chronic kidney disease (HCC)   • Peripheral venous insufficiency   • Raynaud's disease   • Lung nodule   • Chronic diastolic heart failure (HCC)   • Multiple pulmonary nodules determined by computed tomography of lung   • Mass overlapping multiple quadrants of right breast   • Pressure injury of deep tissue of sacral region   • Obesity hypoventilation syndrome (HCC)   • KATRINA (obstructive sleep apnea)   • Chronic respiratory failure with hypoxia and hypercapnia (HCC)   • Platelets decreased (HCC)   • PSVT (paroxysmal supraventricular tachycardia) (HCC)   • Rash   • Impaired mobility and ADLs   • Pressure injury of sacral region, stage 2 (HCC)   • Fall   • Syncope   • Type 2 diabetes mellitus with hyperglycemia, without long-term current use of insulin (HCC)   • Allergies   • Abnormal mammogram   • Intraductal papilloma of right breast   • Ductal carcinoma in situ (DCIS) of right breast     Past Medical History:   Diagnosis Date   • SEBASTIAN (acute kidney injury) ([address]) 01/23/2023   • Anemia    • Asthma    • Atrial fibrillation (HCC)    • Chronic kidney disease    • COVID-19 January 2022   • Diabetes mellitus ([address])    • GERD (gastroesophageal reflux disease)    • Hyperlipidemia    • Hypertension    • PONV (postoperative nausea and vomiting)    • Sleep apnea     wear BIPAP   • SVT (supraventricular tachycardia) ([address])      Past Surgical History:   Procedure Laterality Date   • APPENDECTOMY     • BREAST BIOPSY Right     years ago when she was 23   • BREAST BIOPSY Right 03/13/2023   • BREAST LUMPECTOMY Right 5/30/2023    Procedure: RIGHT BREAST KRIS  DIRECTED LUMPECTOMY - KRIS Arabella Tobias;  Surgeon: Leatha Montero MD;  Location: [facility];  Service: Surgical Oncology   • CYSTOSCOPY W/ LASER LITHOTRIPSY Left 07/12/2016    Procedure: CYSTOSCOPY URETEROSCOPY WITH LITHOTRIPSY HOLMIUM LASER, RETROGRADE PYELOGRAM AND INSERTION STENT URETERAL;  Surgeon: Enio Cortez MD;  Location: [facility];  Service:    • DILATION AND CURETTAGE OF UTERUS     • IR THORACENTESIS  03/18/2022   • JOINT REPLACEMENT      right knee   • KNEE ARTHROPLASTY Right    • MAMMO NEEDLE LOCALIZATION LEFT (ALL INC) EACH ADD Right 4/24/2023   • MAMMO STEREOTACTIC BREAST BIOPSY RIGHT (ALL INC) Right 03/13/2023    High Risk Lesion   • MA ARTHROPLASTY GLENOHUMERAL JOINT TOTAL SHOULDER Left 03/01/2022    Procedure: ARTHROPLASTY SHOULDER REVERSE;  Surgeon: Matt Bhatia MD;  Location: [facility];  Service: Orthopedics   • RI CYSTO BLADDER W/URETERAL CATHETERIZATION Left 2016    Procedure: CYSTOSCOPY RETROGRADE PYELOGRAM WITH INSERTION STENT URETERAL, left;  Surgeon: Fernando Menard MD;  Location: [facility];  Service: Urology   • SHOULDER ARTHROTOMY Left      Family History   Problem Relation Age of Onset   • Heart disease Mother    • Diabetes Father    • Thyroid cancer Sister    • Heart disease Brother    • Diabetes Brother    • Heart disease Brother    • Heart disease Brother    • Emphysema Maternal Grandmother    • Heart disease Family      Social History     Socioeconomic History   • Marital status: Single     Spouse name: Not on file   • Number of children: 0   • Years of education: 15   • Highest education level: Associate degree: academic program   Occupational History   • Not on file   Tobacco Use   • Smoking status: Former     Packs/day: 1.00     Years: 20.00     Total pack years: 20.00     Types: Cigarettes     Quit date: 1996     Years since quittin.0     Passive exposure: Past   • Smokeless tobacco: Never   Vaping Use   • Vaping Use: Never used   Substance and Sexual Activity   • Alcohol use: Not Currently     Comment: rarely   • Drug use: Never   • Sexual activity: Not Currently   Other Topics Concern   • Not on file   Social History Narrative    ** Merged History Encounter **          Social Determinants of Health     Financial Resource Strain: Low Risk  (2023)    Overall Financial Resource Strain (CARDIA)    • Difficulty of Paying Living Expenses: Not hard at all   Food Insecurity: No Food Insecurity (3/28/2023)    Hunger Vital Sign    • Worried About Running Out of Food in the Last Year: Never true    • Ran Out of Food in the Last Year: Never true   Transportation Needs: No Transportation Needs (2023)    PRAPARE - Transportation    • Lack of Transportation (Medical):  No    • Lack of Transportation (Non-Medical): No   Physical Activity: Not on file   Stress: Not on file   Social Connections: Not on file   Intimate Partner Violence: Not on file   Housing Stability: Low Risk  (3/28/2023)    Housing Stability Vital Sign    • Unable to Pay for Housing in the Last Year: No    • Number of Places Lived in the Last Year: 2    • Unstable Housing in the Last Year: No       Current Outpatient Medications:   •  albuterol (PROVENTIL HFA,VENTOLIN HFA) 90 mcg/act inhaler, Inhale 2 puffs every 6 (six) hours as needed for wheezing, Disp: , Rfl:   •  ammonium lactate (LAC-HYDRIN) 12 % lotion, APPLY TOPICALLY TO AFFECTED AREAS twice a day, Disp: , Rfl:   •  apixaban (ELIQUIS) 5 mg, Take 1 tablet (5 mg total) by mouth 2 (two) times a day, Disp: 60 tablet, Rfl: 2  •  B Complex-C-Folic Acid (triphrocaps) 1 MG CAPS, Take 1 capsule (1 mg total) by mouth daily, Disp: 30 capsule, Rfl: 5  •  fluticasone (FLONASE) 50 mcg/act nasal spray, 1 spray into each nostril daily, Disp: , Rfl: 0  •  Fluticasone-Salmeterol (Wixela Inhub) 250-50 mcg/dose inhaler, Inhale 1 puff 2 (two) times a day Rinse mouth after use., Disp: 60 blister, Rfl: 3  •  glucose blood (OneTouch Verio) test strip, Use 1 each 4 (four) times a day Use as instructed, Disp: 400 strip, Rfl: 1  •  insulin lispro (HumaLOG KwikPen) 100 units/mL injection pen, Use 10 units prior to each meal +1 unit per 50 above 150 mg/dL, Disp: 15 mL, Rfl: 2  •  Insulin Pen Needle (BD Pen Needle Pilar 2nd Gen) 32G X 4 MM MISC, For use with insulin pen.  Pharmacy may dispense brand covered by insurance., Disp: 100 each, Rfl: 0  •  Insulin Pen Needle (NovoFine Autocover) 30G X 8 MM MISC, Inject under the skin daily, Disp: 100 each, Rfl: 3  •  Insulin Pen Needle 30G X 8 MM MISC, 2 (two) times a day, Disp: , Rfl:   •  Lancets (onetouch ultrasoft) lancets, Use once daily, Disp: 100 each, Rfl: 2  •  metoprolol succinate (TOPROL-XL) 50 mg 24 hr tablet, take 1 tablet by mouth twice a day, Disp: 60 tablet, Rfl: 1  •  montelukast (SINGULAIR) 10 mg tablet, take 1 tablet by mouth at bedtime, Disp: 30 tablet, Rfl: 1  •  NovoFine Autocover Pen Needle 30G X 8 MM MISC, USE TWICE A DAY, Disp: 300 each, Rfl: 5  •  nystatin (MYCOSTATIN) powder, Apply topically 2 (two) times a day, Disp: 15 g, Rfl: 0  •  pantoprazole (PROTONIX) 40 mg tablet, Take 1 tablet (40 mg total) by mouth daily, Disp: 30 tablet, Rfl: 0  •  pregabalin (LYRICA) 100 mg capsule, take 1 capsule by mouth twice a day, Disp: 180 capsule, Rfl: 1  •  rosuvastatin (CRESTOR) 10 MG tablet, take 1 tablet by mouth once daily, Disp: 90 tablet, Rfl: 1  •  semaglutide, 1 mg/dose, (Ozempic, 1 MG/DOSE,) 4 mg/3 mL injection pen, Inject 0.75 mL (1 mg total) under the skin every 7 days, Disp: 9 mL, Rfl: 1  •  torsemide (DEMADEX) 20 mg tablet, TAKE 2 TABLETS BY MOUTH IN THE A.M., Disp: 180 tablet, Rfl: 1  •  Toujeo SoloStar 300 units/mL CONCENTRATED U-300 injection pen (1-unit dial), inject 25 units subcutaneously daily at bedtime, Disp: 4.5 mL, Rfl: 2  •  Insulin Pen Needle 31G X 8 MM MISC, Use daily Inject under the skin (Patient not taking: Reported on 7/13/2023), Disp: 100 each, Rfl: 2  Allergies   Allergen Reactions   • Penicillins Hives   • Moxifloxacin Other (See Comments)     unknown   • Oxycodone-Acetaminophen Other (See Comments)     GI upset   • Zinc Acetate Other (See Comments)     unknown   • Penicillins Hives   • Asa [Aspirin] GI Intolerance   • Indocin [Indomethacin] Other (See Comments)     Made patient "loopy"   • Other Other (See Comments)     unknown   • Tannic Acid Rash     Vitals:    08/02/23 1348   BP: 130/88   BP Location: Left arm   Patient Position: Sitting   Cuff Size: Standard   Pulse: 79   Resp: 18   Temp: 97.9 °F (36.6 °C)   TempSrc: Temporal   SpO2: 97%   Height: 5' 2" (1.575 m)      Pain Score: 0-No pain

## 2023-08-02 NOTE — PROGRESS NOTES
Maribel De Souza  1950  [facility] [place] [address] [address] 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 Mrs. Ellen Greenberg feels well, clinically there are no concerning findings. Patient will follow-up with Dr. Sharon Gay as directed. Patient denies any family history of breast or ovarian cancer. NCCN guidelines 4.2023 states that for patients with DCIS status post BCS, adjuvant treatments include whole breast irradiation +/- boost.  Patient has been seen by radiation oncology and simulation is pending. ER/ND results came back - positive. We discussed additional risk reduction therapies previously and again today. Patient has decided against any form of hormonal manipulation. At this time, return to medical oncology is on an as-needed basis but Mrs. Ellen Greenberg knows to call if she has any other questions or concerns. Carefully review your medication list and verify that the list is accurate and up-to-date. Please call the hematology/oncology office if there are medications missing from the list, medications on the list that you are not currently taking or if there is a dosage or instruction that is different from how you're taking that medication. Patient goals and areas of care: Follow-up with radiation oncology and surgery  Barriers to care: None  Patient is able to self-care  _____________________________________________________________________________________    Chief Complaint   Patient presents with   • Follow-up   • DCIS     History of Present Illness: 74-year-old female admitted to [facility] with respiratory issues. Patient has a history of obesity, KATRINA, diabetes, hypertension, pulmonary nodules. Hematology/oncology consult was performed on March 27, 2023. A recent mammogram demonstrated a right-sided abnormality.   Eventual biopsy demonstrated atypical cells but no clear diagnosis of a malignancy. Patient was subsequently seen/evaluated by Dr. Trinidad Chappell, surgery and underwent right-sided lumpectomy. Pathology results (listed below) demonstrated ductal carcinoma in situ, few foci, closest margin was 5 mm, background intraductal papilloma and noninvasive lobular neoplasia (ALH). ER and CA only came back, are positive. Presently Mrs. Stefan Mayo states feeling feeling well, baseline. No respiratory issues. No recent hospitalizations. Patient has healed well from the surgery, no breast issues, no pain control issues. Activities are slight. Review of Systems   Constitutional: Positive for fatigue. HENT: Negative. Eyes: Negative. Respiratory: Negative. Cardiovascular: Negative. Gastrointestinal: Negative. Endocrine: Negative. Genitourinary: Negative. Musculoskeletal: Negative. Skin: Negative. Allergic/Immunologic: Negative. Neurological: Negative. Hematological: Negative. Psychiatric/Behavioral: Negative. All other systems reviewed and are negative.     Patient Active Problem List   Diagnosis   • Moderate persistent asthma without complication   • Morbid obesity with BMI of 45.0-49.9, adult ([facility])   • Lower leg edema   • Paroxysmal atrial fibrillation ([facility])   • Mixed hyperlipidemia   • Anemia   • Essential hypertension   • PONV (postoperative nausea and vomiting)   • Gastroesophageal reflux disease   • Nephrolithiasis   • Arthritis   • S/P total knee replacement, right   • On continuous oral anticoagulation - eliquis   • Status post reverse total replacement of left shoulder   • Stage 3 chronic kidney disease ([facility])   • Peripheral venous insufficiency   • Raynaud's disease   • Lung nodule   • Chronic diastolic heart failure (HCC)   • Multiple pulmonary nodules determined by computed tomography of lung   • Mass overlapping multiple quadrants of right breast   • Pressure injury of deep tissue of sacral region   • Obesity hypoventilation syndrome (HCC)   • KATRINA (obstructive sleep apnea)   • Chronic respiratory failure with hypoxia and hypercapnia (HCC)   • Platelets decreased (HCC)   • PSVT (paroxysmal supraventricular tachycardia) (HCC)   • Rash   • Impaired mobility and ADLs   • Pressure injury of sacral region, stage 2 (HCC)   • Fall   • Syncope   • Type 2 diabetes mellitus with hyperglycemia, without long-term current use of insulin (HCC)   • Allergies   • Abnormal mammogram   • Intraductal papilloma of right breast   • Ductal carcinoma in situ (DCIS) of right breast     Past Medical History:   Diagnosis Date   • SEBASTIAN (acute kidney injury) ([address]) 01/23/2023   • Anemia    • Asthma    • Atrial fibrillation (HCC)    • Chronic kidney disease    • COVID-19 January 2022   • Diabetes mellitus ([address])    • GERD (gastroesophageal reflux disease)    • Hyperlipidemia    • Hypertension    • PONV (postoperative nausea and vomiting)    • Sleep apnea     wear BIPAP   • SVT (supraventricular tachycardia) ([address])      Past Surgical History:   Procedure Laterality Date   • APPENDECTOMY     • BREAST BIOPSY Right     years ago when she was 23   • BREAST BIOPSY Right 03/13/2023   • BREAST LUMPECTOMY Right 5/30/2023    Procedure: RIGHT BREAST KRIS  DIRECTED LUMPECTOMY - Elizabeth Forrester;  Surgeon: Ortega Rivera MD;  Location: [facility];  Service: Surgical Oncology   • CYSTOSCOPY W/ LASER LITHOTRIPSY Left 07/12/2016    Procedure: CYSTOSCOPY URETEROSCOPY WITH LITHOTRIPSY HOLMIUM LASER, RETROGRADE PYELOGRAM AND INSERTION STENT URETERAL;  Surgeon: Laz Thapa MD;  Location: [facility];  Service:    • DILATION AND CURETTAGE OF UTERUS     • IR THORACENTESIS  03/18/2022   • JOINT REPLACEMENT      right knee   • KNEE ARTHROPLASTY Right    • MAMMO NEEDLE LOCALIZATION LEFT (ALL INC) EACH ADD Right 4/24/2023   • MAMMO STEREOTACTIC BREAST BIOPSY RIGHT (ALL INC) Right 03/13/2023    High Risk Lesion   • WI ARTHROPLASTY GLENOHUMERAL JOINT TOTAL SHOULDER Left 2022    Procedure: ARTHROPLASTY SHOULDER REVERSE;  Surgeon: Sundar Ya MD;  Location: [facility];  Service: Orthopedics   • IL CYSTO BLADDER W/URETERAL CATHETERIZATION Left 2016    Procedure: CYSTOSCOPY RETROGRADE PYELOGRAM WITH INSERTION STENT URETERAL, left;  Surgeon: Ava Burnett MD;  Location: [facility];  Service: Urology   • SHOULDER ARTHROTOMY Left      Family History   Problem Relation Age of Onset   • Heart disease Mother    • Diabetes Father    • Thyroid cancer Sister    • Heart disease Brother    • Diabetes Brother    • Heart disease Brother    • Heart disease Brother    • Emphysema Maternal Grandmother    • Heart disease Family      Social History     Socioeconomic History   • Marital status: Single     Spouse name: Not on file   • Number of children: 0   • Years of education: 15   • Highest education level: Associate degree: academic program   Occupational History   • Not on file   Tobacco Use   • Smoking status: Former     Packs/day: 1.00     Years: 20.00     Total pack years: 20.00     Types: Cigarettes     Quit date: 1996     Years since quittin.0     Passive exposure: Past   • Smokeless tobacco: Never   Vaping Use   • Vaping Use: Never used   Substance and Sexual Activity   • Alcohol use: Not Currently     Comment: rarely   • Drug use: Never   • Sexual activity: Not Currently   Other Topics Concern   • Not on file   Social History Narrative    ** Merged History Encounter **          Social Determinants of Health     Financial Resource Strain: Low Risk  (2023)    Overall Financial Resource Strain (CARDIA)    • Difficulty of Paying Living Expenses: Not hard at all   Food Insecurity: No Food Insecurity (3/28/2023)    Hunger Vital Sign    • Worried About Running Out of Food in the Last Year: Never true    • Ran Out of Food in the Last Year: Never true   Transportation Needs: No Transportation Needs (2023) PRAPARE - Transportation    • Lack of Transportation (Medical): No    • Lack of Transportation (Non-Medical): No   Physical Activity: Not on file   Stress: Not on file   Social Connections: Not on file   Intimate Partner Violence: Not on file   Housing Stability: Low Risk  (3/28/2023)    Housing Stability Vital Sign    • Unable to Pay for Housing in the Last Year: No    • Number of Places Lived in the Last Year: 2    • Unstable Housing in the Last Year: No       Current Outpatient Medications:   •  albuterol (PROVENTIL HFA,VENTOLIN HFA) 90 mcg/act inhaler, Inhale 2 puffs every 6 (six) hours as needed for wheezing, Disp: , Rfl:   •  ammonium lactate (LAC-HYDRIN) 12 % lotion, APPLY TOPICALLY TO AFFECTED AREAS twice a day, Disp: , Rfl:   •  apixaban (ELIQUIS) 5 mg, Take 1 tablet (5 mg total) by mouth 2 (two) times a day, Disp: 60 tablet, Rfl: 2  •  B Complex-C-Folic Acid (triphrocaps) 1 MG CAPS, Take 1 capsule (1 mg total) by mouth daily, Disp: 30 capsule, Rfl: 5  •  fluticasone (FLONASE) 50 mcg/act nasal spray, 1 spray into each nostril daily, Disp: , Rfl: 0  •  Fluticasone-Salmeterol (Wixela Inhub) 250-50 mcg/dose inhaler, Inhale 1 puff 2 (two) times a day Rinse mouth after use., Disp: 60 blister, Rfl: 3  •  glucose blood (OneTouch Verio) test strip, Use 1 each 4 (four) times a day Use as instructed, Disp: 400 strip, Rfl: 1  •  insulin lispro (HumaLOG KwikPen) 100 units/mL injection pen, Use 10 units prior to each meal +1 unit per 50 above 150 mg/dL, Disp: 15 mL, Rfl: 2  •  Insulin Pen Needle (BD Pen Needle Pilar 2nd Gen) 32G X 4 MM MISC, For use with insulin pen.  Pharmacy may dispense brand covered by insurance., Disp: 100 each, Rfl: 0  •  Insulin Pen Needle (NovoFine Autocover) 30G X 8 MM MISC, Inject under the skin daily, Disp: 100 each, Rfl: 3  •  Insulin Pen Needle 30G X 8 MM MISC, 2 (two) times a day, Disp: , Rfl:   •  Insulin Pen Needle 31G X 8 MM MISC, Use daily Inject under the skin, Disp: 100 each, Rfl: 2  • Lancets (onetouch ultrasoft) lancets, Use once daily, Disp: 100 each, Rfl: 2  •  metoprolol succinate (TOPROL-XL) 50 mg 24 hr tablet, take 1 tablet by mouth twice a day, Disp: 60 tablet, Rfl: 1  •  montelukast (SINGULAIR) 10 mg tablet, take 1 tablet by mouth at bedtime, Disp: 30 tablet, Rfl: 1  •  NovoFine Autocover Pen Needle 30G X 8 MM MISC, USE TWICE A DAY, Disp: 300 each, Rfl: 5  •  nystatin (MYCOSTATIN) powder, Apply topically 2 (two) times a day, Disp: 15 g, Rfl: 0  •  pantoprazole (PROTONIX) 40 mg tablet, Take 1 tablet (40 mg total) by mouth daily, Disp: 30 tablet, Rfl: 0  •  pregabalin (LYRICA) 100 mg capsule, take 1 capsule by mouth twice a day, Disp: 180 capsule, Rfl: 1  •  rosuvastatin (CRESTOR) 10 MG tablet, take 1 tablet by mouth once daily, Disp: 90 tablet, Rfl: 1  •  semaglutide, 1 mg/dose, (Ozempic, 1 MG/DOSE,) 4 mg/3 mL injection pen, Inject 0.75 mL (1 mg total) under the skin every 7 days, Disp: 9 mL, Rfl: 1  •  torsemide (DEMADEX) 20 mg tablet, TAKE 2 TABLETS BY MOUTH IN THE A.M., Disp: 180 tablet, Rfl: 1  •  Toujeo SoloStar 300 units/mL CONCENTRATED U-300 injection pen (1-unit dial), inject 25 units subcutaneously daily at bedtime, Disp: 4.5 mL, Rfl: 2    Allergies   Allergen Reactions   • Penicillins Hives   • Moxifloxacin Other (See Comments)     unknown   • Oxycodone-Acetaminophen Other (See Comments)     GI upset   • Zinc Acetate Other (See Comments)     unknown   • Penicillins Hives   • Asa [Aspirin] GI Intolerance   • Indocin [Indomethacin] Other (See Comments)     Made patient "loopy"   • Other Other (See Comments)     unknown   • Tannic Acid Rash       Vitals:    08/02/23 1507   BP: 126/66   Pulse: 63   Resp: 20   Temp: (!) 97.2 °F (36.2 °C)   SpO2: 92%     Physical Exam  Constitutional:       Appearance: She is well-developed. Comments: Obese female, no respiratory distress, no signs of pain   HENT:      Head: Normocephalic and atraumatic.       Right Ear: External ear normal. Left Ear: External ear normal.   Eyes:      Conjunctiva/sclera: Conjunctivae normal.      Pupils: Pupils are equal, round, and reactive to light. Cardiovascular:      Rate and Rhythm: Normal rate and regular rhythm. Heart sounds: Normal heart sounds. Pulmonary:      Effort: Pulmonary effort is normal.      Breath sounds: Normal breath sounds. Comments: Distant breath sounds bilaterally  Abdominal:      General: Bowel sounds are normal.      Palpations: Abdomen is soft. Comments: + Bowel sounds, nontender, soft, obese cannot palpate liver or spleen, no guarding   Musculoskeletal:         General: Normal range of motion. Cervical back: Normal range of motion and neck supple. Skin:     General: Skin is warm. Neurological:      Mental Status: She is alert and oriented to person, place, and time. Deep Tendon Reflexes: Reflexes are normal and symmetric. Psychiatric:         Behavior: Behavior normal.         Thought Content:  Thought content normal.         Judgment: Judgment normal.     Breasts: Deferred, no axillary adenopathy bilaterally   Extremities: 0-1+ bilateral lower extremity edema, no cords, pulses are 1+  Lymphatics: No adenopathy in the neck, supraclavicular region, axilla bilaterally    Performance Status: 0 - Asymptomatic    Labs    4/29/2023 BUN = 37 creatinine = 1.67 glucose = 288 calcium = 8.1    4/28/2023 WBC = 9.7 hemoglobin = 11.6 hematocrit = 35.5 platelet = 384 neutrophil = 58%    Imaging     3/26/2023 CAT scan chest abdomen pelvis without     CHEST     LUNGS:  Multiple nodules, unchanged since 3/9/2022:  5 mm anterior right apical nodule, image 6/23.  5 mm left lower lobe superior segment nodule, image 6/40.  3 mm posterior lateral right upper lobe nodule, image 6/47.  6 mm anterior right lower lobe nodule, image 6/60.     No acute infiltrates.  There is no tracheal or endobronchial lesion.     PLEURA:  Unremarkable     IMPRESSION:     No evidence of malignancy or source for sepsis identified in the chest, abdomen or pelvis. Stable pulmonary nodules. Hepatomegaly. Colonic diverticulosis.    3/9/2023 mammogram diagnostic right. Suspicious microcalcification for which stereotactic core biopsy sampling is recommended, category 4.     1/12/2023 mammogram screening bilateral.  Impression stated that additional imaging required, right was category 0, incomplete, needs additional evaluation.     Pathology    Case Report   Surgical Pathology Report                         Case: [id number]                                    Authorizing Provider: Sheri Ponce,  Collected:           05/30/2023 1016                                      MD                                                                            Ordering Location:     [facility] Received:            05/30/2023 1049                                      Operating Room                                                                Pathologist:           Scot Walters MD                                                                 Specimen:    Breast, Right, Right Breast Evelyn Directed Lumpectomy Suture Cox Short Superior Long                Lateral                                                                                    Addendum 2   At the request of Dr. Homer Muller, unstained slides from paraffin BLOCK A1 containing the patient's cancer cells were sent to [facility] for [address] [place] [address] testing. Upon completion of testing, the [facility] report will be directly sent to the requesting physician as well as posted in the Media Tab of the patient's ODIN EMR by the [facility] Department.     Please note: The [facility]'s analysis and report is performed independently of [address] [place] [address], and neither the Hospital nor the Pathology Department screen, review or comment upon [facility]'s report.  Because the role of testing in cancer diagnosis and management is subject to evolving development, usage and interpretation, we strongly urge that the test limitations described in the [facility] report be carefully read, appropriately shared with the patient, and critically considered when reaching treatment decisions.     This pathology material was removed from archive for purpose of molecular testing. Addendum electronically signed by Velvet Chinchilla MD on 8/1/2023 at 11:52 AM   Addendum   Test Description                                     Result                                         Prognostic Interpretation  Estrogen Receptor (clone SP-1)              100%                                           Positive   Internal control: Positive                           Staining Intensity: Strong  External control: Positive                          Danny Score*:  8                                                         Progesterone Receptor (clone 1E2)         100%                                           Positive   Internal control: Positive                           Staining Intensity: Strong  External control: Positive                         Danny Score*:  8        Fixative Duration of Fixation (hrs)             Cold Ischemia Time (mins)           Sample Adequacy  Formalin            32 hours                                      Not stated                                    Adequate  Appropriate positive and negative controls were reviewed. These immunohistochemical tests were performed at [facility] in [facility] and interpreted by Dr. Danielle Perea. An electronic copy of this report will be kept on file in the Medical Records Department at [facility].  * The Danny score is a semi quantitative system that takes into consideration the proportion of positive cells (scored on a scale of 0-5) and staining intensity (scored on a scale of 0-3).   The proportion and intensity are summed to produce total scores of 0 or 2 through 8. A score of 0-2 is regarded as negative while 3-8 as positive. Addendum electronically signed by Shirley Chino MD on 6/10/2023 at  1:56 PM   Final Diagnosis   A. Right breast (lumpectomy):     - Ductal carcinoma in-situ (DCIS), grade 1-2 of 3.     - Few foci of DCIS present, with the largest contiguous focus measuring 6 mm. - Closest margin: medial (approximately 5 mm from DCIS). - Background intraductal papilloma. See comment.      - Non-invasive lobular neoplasia ([address]).    Comment:  - In addition to DCIS, intraductal papilloma with at least ADH is present (partial involvement by DCIS not excluded). - ER / SD pending. Electronically signed by Shirley Chino MD on 6/7/2023 at 12:51 PM   Microscopic Description    - Immunohistochemical studies (with appropriate controls) demonstrate:     DCIS:  Membranous E-cadherin and P120 catenin. P63 and SMM-HC present. CK5/6 peripheral.      ALH:  Absent e-cadherin. Note    - BEST TUMOR SECTION:   A1.  - Intradepartmental consultation concurs with the diagnosis. Additional Information    All reported additional testing was performed with appropriately reactive controls.  These tests were developed and their performance characteristics determined by [facility] or appropriate performing facility, though some tests may be performed on tissues which have not been validated for performance characteristics (such as staining performed on alcohol exposed cell blocks and decalcified tissues).  Results should be interpreted with caution and in the context of the patients’ clinical condition. These tests may not be cleared or approved by the U.S. Food and Drug Administration, though the FDA has determined that such clearance or approval is not necessary. These tests are used for clinical purposes and they should not be regarded as investigational or for research.  This laboratory has been approved by CLIA 88, designated as a high-complexity laboratory and is qualified to perform these tests. Interpretation performed at [facility], [facility], [address] [place] [address] Synoptic Checklist   DCIS OF THE BREAST: Resection  8th Edition - Protocol posted: 3/23/2022  DCIS OF THE BREAST: COMPLETE EXCISION - All Specimens  SPECIMEN   Procedure  Excision (less than total mastectomy)    Specimen Laterality  Right    TUMOR   Tumor Site  Clock position      9 o'clock    Histologic Type  Ductal carcinoma in situ    Size (Extent) of DCIS  Estimated size (extent) of DCIS is at least (Millimeters): 6 mm   Architectural Patterns  Cribriform      Papillary      Solid    Nuclear Grade  Grade II (intermediate)    Necrosis  Present, central (expansive "comedo" necrosis)    Microcalcifications  Present in DCIS      Present in nonneoplastic tissue    MARGINS   Margin Status  All margins negative for DCIS    Distance from DCIS to Closest Margin  Greater than: 3 mm   REGIONAL LYMPH NODES   Regional Lymph Node Status  Not applicable (no regional lymph nodes submitted or found)    PATHOLOGIC STAGE CLASSIFICATION (pTNM, AJCC 8th Edition)   Reporting of pT, pN, and (when applicable) pM categories is based on information available to the pathologist at the time the report is issued. As per the AJCC (Chapter 1, 8th Ed.) it is the managing physician’s responsibility to establish the final pathologic stage based upon all pertinent information, including but potentially not limited to this pathology report. pT Category  pTis (DCIS)    pN Category  pN not assigned (no nodes submitted or found)    ADDITIONAL FINDINGS   Additional Findings  Proliferative fibrocystic change and intraductal papilloma    .           Case Report   Surgical Pathology Report                         Case: [id number]                                    Authorizing Provider: Ulysses Math, MD  Collected:           03/13/2023 1055               Ordering Location:     [address] Received:            03/13/2023 1153                                      Mammography                                                                   Pathologist:           Roxana Aguillon MD                                                        Specimen:    Breast, Right, right breast calcs. 9:00                                                     Final Diagnosis   A. Breast, Right, right breast calcs.  9:00:  -Fragments of Atypical intraductal papillary proliferation with central hyalinized stromal core and associated calcification.     Comment  The differential diagnosis includes  ductal carcinoma in situ /DCIS arising in the background of intraductal papilloma vs. intraductal papilloma with atypical ductal hyperplasia   Further characterization will be performed on the resection specimen.      Note   stain highlights  intraductal epithelial proliferation while CK5/6 shows patchy staining .  Calponin stain highlights myoepithelial layer surrounding the lesion.  Controls reacted appropriately    Electronically signed by Mary Mnoteiro MD on 3/15/2023 at 10:56 AM

## 2023-08-03 NOTE — PROGRESS NOTES
Patient was not seen or examined personally.   Wilson Rodriguez data was reviewed today and recommendations made

## 2023-08-03 NOTE — PROGRESS NOTES
Yung Genao  1950  [id number]    Radiation Oncology Follow Up    History of present illness: Ms. Devin Cooper is a 45-year-old postmenopausal woman with significant comorbidities including congestive heart failure, paroxysmal atrial fibrillation and supraventricular tachycardia, diabetes and asthma with chronic respiratory failure with shortness of breath at rest, arthritis and hypertension. She had routine screening mammograms in January 2023 that showed an area of linear calcifications in the upper lateral right breast.  Additional diagnostic imaging confirmed the presence of suspicious calcifications and a biopsy on March 13 showed atypical intraductal papillary lesion, possible diagnosis of DCIS. Patient therefore underwent a lumpectomy of the area of calcifications by Dr. Armand Lobo on May 30. Pathology did show a few foci of estrogen and progesterone receptor positive ductal carcinoma in situ that was grade 1-2, the largest measuring 6 mm with negative margins of excision. Patient has done well postoperatively with no complications. She was seen in consultation July 19 for information regarding the potential role of radiation therapy in the setting of breast conservation treatment for ductal carcinoma in situ of the right breast, pathologic stage pTis cN0 cM0, stage 0. Given the patient's presentation with intraductal cancer and comorbidities I ordered a DCISion RT assay to help with medical decision making regarding indications for adjuvant radiation. The study returned with a score of 3.7 which falls in the low end of the elevated risk range.   The patient returns today to discuss the assay results and to finalize treatment plan.     Of note over the past 6 months the patient has had multiple visits to the emergency department and admissions for cardiopulmonary comorbidities including CHF exacerbation, pneumonia requiring antibiotics and chronic wheezing with shortness of breath.           Oncology History Ductal carcinoma in situ (DCIS) of right breast   3/13/2023 Biopsy    A. Breast, Right, right breast calcs. 9:00:  -Fragments of Atypical intraductal papillary proliferation with central hyalinized stromal core and associated calcification. Comment  The differential diagnosis includes  ductal carcinoma in situ /DCIS arising in the background of intraductal papilloma vs. intraductal papilloma with atypical ductal hyperplasia   Further characterization will be performed on the resection specimen     5/30/2023 Initial Diagnosis    Ductal carcinoma in situ (DCIS) of right breast     5/30/2023 Surgery    Right breast bhavik  directed lumpectomy  Ductal carcinoma in situ  Grade 1-2  Few foci of DCIS present, with the largest contiguous focus measuring 6mm  Closest margin: medial (approximately 5mm from DCIS)             5/30/2023 -  Cancer Staged    Staging form: Breast, AJCC 8th Edition  - Pathologic stage from 5/30/2023: Stage 0 (pTis (DCIS), cN0, cM0, G2, ER+, DE+, HER2: Unknown) - Signed by Sanam Pelaez MD on 7/19/2023  Histopathologic type: Intraductal carcinoma, noninfiltrating, NOS  Stage prefix: Initial diagnosis  Nuclear grade: G2  Multigene prognostic tests performed:  Other  Histologic grading system: 3 grade system  Laterality: Right  Tumor size (mm): 6  Lymph-vascular invasion (LVI): LVI not present (absent)/not identified           Patient Active Problem List   Diagnosis   • Moderate persistent asthma without complication   • Morbid obesity with BMI of 45.0-49.9, adult (HCC)   • Lower leg edema   • Paroxysmal atrial fibrillation (HCC)   • Mixed hyperlipidemia   • Anemia   • Essential hypertension   • PONV (postoperative nausea and vomiting)   • Gastroesophageal reflux disease   • Nephrolithiasis   • Arthritis   • S/P total knee replacement, right   • On continuous oral anticoagulation - eliquis   • Status post reverse total replacement of left shoulder   • Stage 3 chronic kidney disease [facility])   • Peripheral venous insufficiency   • Raynaud's disease   • Lung nodule   • Chronic diastolic heart failure (HCC)   • Multiple pulmonary nodules determined by computed tomography of lung   • Mass overlapping multiple quadrants of right breast   • Pressure injury of deep tissue of sacral region   • Obesity hypoventilation syndrome (HCC)   • KATRINA (obstructive sleep apnea)   • Chronic respiratory failure with hypoxia and hypercapnia (HCC)   • Platelets decreased (HCC)   • PSVT (paroxysmal supraventricular tachycardia) (HCC)   • Rash   • Impaired mobility and ADLs   • Pressure injury of sacral region, stage 2 (HCC)   • Fall   • Syncope   • Type 2 diabetes mellitus with hyperglycemia, without long-term current use of insulin (HCC)   • Allergies   • Abnormal mammogram   • Intraductal papilloma of right breast   • Ductal carcinoma in situ (DCIS) of right breast    Cancer Staging   Ductal carcinoma in situ (DCIS) of right breast  Staging form: Breast, AJCC 8th Edition  - Pathologic stage from 5/30/2023: Stage 0 (pTis (DCIS), cN0, cM0, G2, ER+, ND+, HER2: Unknown) - Signed by Duarte Lr MD on 7/19/2023  Histopathologic type: Intraductal carcinoma, noninfiltrating, NOS  Stage prefix: Initial diagnosis  Nuclear grade: G2  Multigene prognostic tests performed:  Other  Histologic grading system: 3 grade system  Laterality: Right  Tumor size (mm): 6  Lymph-vascular invasion (LVI): LVI not present (absent)/not identified    Past Medical History:   Diagnosis Date   • SEBASTIAN (acute kidney injury) ([address]) 01/23/2023   • Anemia    • Asthma    • Atrial fibrillation ([address])    • Chronic kidney disease    • COVID-19 January 2022   • Diabetes mellitus ([address])    • GERD (gastroesophageal reflux disease)    • Hyperlipidemia    • Hypertension    • PONV (postoperative nausea and vomiting)    • Sleep apnea     wear BIPAP   • SVT (supraventricular tachycardia) ([address])      Social History     Socioeconomic History   • Marital status: Single     Spouse name: Not on file   • Number of children: 0   • Years of education: 15   • Highest education level: Associate degree: academic program   Occupational History   • Not on file   Tobacco Use   • Smoking status: Former     Packs/day: 1.00     Years: 20.00     Total pack years: 20.00     Types: Cigarettes     Quit date: 1996     Years since quittin.0     Passive exposure: Past   • Smokeless tobacco: Never   Vaping Use   • Vaping Use: Never used   Substance and Sexual Activity   • Alcohol use: Not Currently     Comment: rarely   • Drug use: Never   • Sexual activity: Not Currently   Other Topics Concern   • Not on file   Social History Narrative    ** Merged History Encounter **          Social Determinants of Health     Financial Resource Strain: Low Risk  (2023)    Overall Financial Resource Strain (CARDIA)    • Difficulty of Paying Living Expenses: Not hard at all   Food Insecurity: No Food Insecurity (3/28/2023)    Hunger Vital Sign    • Worried About Running Out of Food in the Last Year: Never true    • Ran Out of Food in the Last Year: Never true   Transportation Needs: No Transportation Needs (2023)    PRAPARE - Transportation    • Lack of Transportation (Medical): No    • Lack of Transportation (Non-Medical):  No   Physical Activity: Not on file   Stress: Not on file   Social Connections: Not on file   Intimate Partner Violence: Not on file   Housing Stability: Low Risk  (3/28/2023)    Housing Stability Vital Sign    • Unable to Pay for Housing in the Last Year: No    • Number of Places Lived in the Last Year: 2    • Unstable Housing in the Last Year: No     Family History   Problem Relation Age of Onset   • Heart disease Mother    • Diabetes Father    • Thyroid cancer Sister    • Heart disease Brother    • Diabetes Brother    • Heart disease Brother    • Heart disease Brother    • Emphysema Maternal Grandmother    • Heart disease Family      Past Surgical History: Procedure Laterality Date   • APPENDECTOMY     • BREAST BIOPSY Right     years ago when she was 23   • BREAST BIOPSY Right 03/13/2023   • BREAST LUMPECTOMY Right 5/30/2023    Procedure: RIGHT BREAST KRIS  DIRECTED LUMPECTOMY - Ofe Morgan;  Surgeon: Felix Arzate MD;  Location: [facility];  Service: Surgical Oncology   • CYSTOSCOPY W/ LASER LITHOTRIPSY Left 07/12/2016    Procedure: CYSTOSCOPY URETEROSCOPY WITH LITHOTRIPSY HOLMIUM LASER, RETROGRADE PYELOGRAM AND INSERTION STENT URETERAL;  Surgeon: Zac Bernardo MD;  Location: [facility] ([facility]);  Service:    • DILATION AND CURETTAGE OF UTERUS     • IR THORACENTESIS  03/18/2022   • JOINT REPLACEMENT      right knee   • KNEE ARTHROPLASTY Right    • MAMMO NEEDLE LOCALIZATION LEFT (ALL INC) EACH ADD Right 4/24/2023   • MAMMO STEREOTACTIC BREAST BIOPSY RIGHT (ALL INC) Right 03/13/2023    High Risk Lesion   • WY ARTHROPLASTY GLENOHUMERAL JOINT TOTAL SHOULDER Left 03/01/2022    Procedure: ARTHROPLASTY SHOULDER REVERSE;  Surgeon: Gelacio Kaba MD;  Location: [facility] ([facility]);  Service: Orthopedics   • WY CYSTO BLADDER W/URETERAL CATHETERIZATION Left 06/29/2016    Procedure: CYSTOSCOPY RETROGRADE PYELOGRAM WITH INSERTION STENT URETERAL, left;  Surgeon: Zac Bernardo MD;  Location: [facility];  Service: Urology   • SHOULDER ARTHROTOMY Left        Current Outpatient Medications:   •  albuterol (PROVENTIL HFA,VENTOLIN HFA) 90 mcg/act inhaler, Inhale 2 puffs every 6 (six) hours as needed for wheezing, Disp: , Rfl:   •  ammonium lactate (LAC-HYDRIN) 12 % lotion, APPLY TOPICALLY TO AFFECTED AREAS twice a day, Disp: , Rfl:   •  apixaban (ELIQUIS) 5 mg, Take 1 tablet (5 mg total) by mouth 2 (two) times a day, Disp: 60 tablet, Rfl: 2  •  B Complex-C-Folic Acid (triphrocaps) 1 MG CAPS, Take 1 capsule (1 mg total) by mouth daily, Disp: 30 capsule, Rfl: 5  •  fluticasone (FLONASE) 50 mcg/act nasal spray, 1 spray into each nostril daily, Disp: , Rfl: 0  • Fluticasone-Salmeterol (Wixela Inhub) 250-50 mcg/dose inhaler, Inhale 1 puff 2 (two) times a day Rinse mouth after use., Disp: 60 blister, Rfl: 3  •  glucose blood (OneTouch Verio) test strip, Use 1 each 4 (four) times a day Use as instructed, Disp: 400 strip, Rfl: 1  •  insulin lispro (HumaLOG KwikPen) 100 units/mL injection pen, Use 10 units prior to each meal +1 unit per 50 above 150 mg/dL, Disp: 15 mL, Rfl: 2  •  Insulin Pen Needle (BD Pen Needle Pilar 2nd Gen) 32G X 4 MM MISC, For use with insulin pen.  Pharmacy may dispense brand covered by insurance., Disp: 100 each, Rfl: 0  •  Insulin Pen Needle (NovoFine Autocover) 30G X 8 MM MISC, Inject under the skin daily, Disp: 100 each, Rfl: 3  •  Insulin Pen Needle 30G X 8 MM MISC, 2 (two) times a day, Disp: , Rfl:   •  Lancets (onetouch ultrasoft) lancets, Use once daily, Disp: 100 each, Rfl: 2  •  metoprolol succinate (TOPROL-XL) 50 mg 24 hr tablet, take 1 tablet by mouth twice a day, Disp: 60 tablet, Rfl: 1  •  montelukast (SINGULAIR) 10 mg tablet, take 1 tablet by mouth at bedtime, Disp: 30 tablet, Rfl: 1  •  NovoFine Autocover Pen Needle 30G X 8 MM MISC, USE TWICE A DAY, Disp: 300 each, Rfl: 5  •  nystatin (MYCOSTATIN) powder, Apply topically 2 (two) times a day, Disp: 15 g, Rfl: 0  •  pantoprazole (PROTONIX) 40 mg tablet, Take 1 tablet (40 mg total) by mouth daily, Disp: 30 tablet, Rfl: 0  •  pregabalin (LYRICA) 100 mg capsule, take 1 capsule by mouth twice a day, Disp: 180 capsule, Rfl: 1  •  rosuvastatin (CRESTOR) 10 MG tablet, take 1 tablet by mouth once daily, Disp: 90 tablet, Rfl: 1  •  semaglutide, 1 mg/dose, (Ozempic, 1 MG/DOSE,) 4 mg/3 mL injection pen, Inject 0.75 mL (1 mg total) under the skin every 7 days, Disp: 9 mL, Rfl: 1  •  torsemide (DEMADEX) 20 mg tablet, TAKE 2 TABLETS BY MOUTH IN THE A.M., Disp: 180 tablet, Rfl: 1  •  Toujeo SoloStar 300 units/mL CONCENTRATED U-300 injection pen (1-unit dial), inject 25 units subcutaneously daily at bedtime, Disp: 4.5 mL, Rfl: 2  •  Insulin Pen Needle 31G X 8 MM MISC, Use daily Inject under the skin, Disp: 100 each, Rfl: 2  Allergies   Allergen Reactions   • Penicillins Hives   • Moxifloxacin Other (See Comments)     unknown   • Oxycodone-Acetaminophen Other (See Comments)     GI upset   • Zinc Acetate Other (See Comments)     unknown   • Penicillins Hives   • Asa [Aspirin] GI Intolerance   • Indocin [Indomethacin] Other (See Comments)     Made patient "loopy"   • Other Other (See Comments)     unknown   • Tannic Acid Rash     Physical Exam:  Physical Exam  Constitutional:       General: She is not in acute distress. Appearance: Normal appearance. HENT:      Nose: No congestion. Eyes:      Extraocular Movements: Extraocular movements intact. Pupils: Pupils are equal, round, and reactive to light. Pulmonary:      Effort: Pulmonary effort is normal. No respiratory distress. Chest:          Comments: Well-healed incision right breast.  Musculoskeletal:         General: No swelling. Normal range of motion. Neurological:      Mental Status: She is alert.          LABS:    CBC  Diff   Lab Results   Component Value Date/Time    WBC 9.70 04/28/2023 05:46 PM    HGB 11.6 04/28/2023 05:46 PM    HCT 35.5 04/28/2023 05:46 PM    RBC 3.87 04/28/2023 05:46 PM    MCV 92 04/28/2023 05:46 PM    MCHC 32.7 04/28/2023 05:46 PM    MCH 30.0 04/28/2023 05:46 PM    RDW 14.3 04/28/2023 05:46 PM    MPV 10.6 04/28/2023 05:46 PM    Lab Results   Component Value Date/Time    LYMPHSABS 2.17 04/28/2023 05:46 PM    EOSABS 0.73 (H) 04/28/2023 05:46 PM    MONOSABS 1.07 04/28/2023 05:46 PM    BASOSABS 0.08 04/28/2023 05:46 PM        Basic Metabolic Profile    Lab Results   Component Value Date/Time    SODIUM 136 04/30/2023 05:31 AM    CO2 28 04/30/2023 05:31 AM    Lab Results   Component Value Date/Time    BUN 47 (H) 04/30/2023 05:31 AM    CREATININE 1.82 (H) 04/30/2023 05:31 AM            Discussion/Summary: Ms. Biju Bolton is a 72-year-old woman with significant cardiopulmonary comorbidities who had a mammographic abnormality in the lateral right breast, biopsy showing a papillary lesion. Lumpectomy revealed a grade 1-2 ductal carcinoma in situ that is strongly estrogen and progesterone receptor positive with negative margins of excision. She is healed well postoperatively. She is referred for consideration of adjuvant radiation for pathologic stage pTis cN0 cM0, stage 0 right breast cancer. Decision RT score returned at 3.7, which falls in the lower end of the elevated risk range. The assay predicts a 10-year risk of in breast recurrence of 12% without radiation and 5% with radiation, and of invasive recurrence 8% without radiation and 3% with radiation. I reviewed these findings with the patient and her niece. I reviewed the potential radiation options including hypofractionated whole breast radiation or accelerated partial breast irradiation. I advised that omission of radiation would be an option depending upon the patient's comfort level with the predicted risk of local recurrence. Patient and her family asked a number of pertinent questions which were answered to their satisfaction. She would like to proceed with adjuvant radiation to reduce the risk of local recurrence. We will plan to treat with accelerated partial breast irradiation. Informed consent was reviewed by me and signed by the patient today. CT simulation was scheduled. I spent 20 minutes reviewing the medical record interval history, reviewing the genetic assay results with the patient and her family and reviewing treatment options.

## 2023-08-04 ENCOUNTER — PATIENT OUTREACH (OUTPATIENT)
Dept: CASE MANAGEMENT | Facility: HOSPITAL | Age: 73
End: 2023-08-04

## 2023-08-04 ENCOUNTER — PATIENT OUTREACH (OUTPATIENT)
Dept: HEMATOLOGY ONCOLOGY | Facility: CLINIC | Age: 73
End: 2023-08-04

## 2023-08-04 NOTE — PROGRESS NOTES
Transferring pt to MSALIREZA Mcfarland as she will have radiation tx at [facility].

## 2023-08-04 NOTE — PROGRESS NOTES
Breast Oncology Nurse Navigator    Called patient to check in after her appointments with medical oncology and radiation oncology this week. Left a voicemail and included my phone number and office hours. Requested a call back.

## 2023-08-11 ENCOUNTER — APPOINTMENT (EMERGENCY)
Dept: RADIOLOGY | Facility: HOSPITAL | Age: 73
DRG: 853 | End: 2023-08-11
Payer: MEDICARE

## 2023-08-11 ENCOUNTER — HOSPITAL ENCOUNTER (INPATIENT)
Facility: HOSPITAL | Age: 73
LOS: 11 days | Discharge: NURSING, REHAB OR LONG-TERM CARE | DRG: 853 | End: 2023-08-22
Attending: EMERGENCY MEDICINE | Admitting: INTERNAL MEDICINE
Payer: MEDICARE

## 2023-08-11 DIAGNOSIS — N61.1 BREAST ABSCESS OF FEMALE: ICD-10-CM

## 2023-08-11 DIAGNOSIS — D05.11 DUCTAL CARCINOMA IN SITU (DCIS) OF RIGHT BREAST: ICD-10-CM

## 2023-08-11 DIAGNOSIS — J18.9 PNEUMONIA: ICD-10-CM

## 2023-08-11 DIAGNOSIS — K59.04 CHRONIC IDIOPATHIC CONSTIPATION: ICD-10-CM

## 2023-08-11 DIAGNOSIS — R50.9 FEVER: ICD-10-CM

## 2023-08-11 DIAGNOSIS — R53.83 FATIGUE: ICD-10-CM

## 2023-08-11 DIAGNOSIS — D64.9 ANEMIA, UNSPECIFIED TYPE: ICD-10-CM

## 2023-08-11 DIAGNOSIS — I63.9 STROKE (HCC): ICD-10-CM

## 2023-08-11 DIAGNOSIS — A41.9 SEPSIS (HCC): Primary | ICD-10-CM

## 2023-08-11 DIAGNOSIS — R09.02 HYPOXEMIA: ICD-10-CM

## 2023-08-11 DIAGNOSIS — N17.9 ACUTE KIDNEY INJURY SUPERIMPOSED ON CHRONIC KIDNEY DISEASE (HCC): ICD-10-CM

## 2023-08-11 DIAGNOSIS — I63.9 STROKE (CEREBRUM) (HCC): ICD-10-CM

## 2023-08-11 DIAGNOSIS — N18.9 ACUTE KIDNEY INJURY SUPERIMPOSED ON CHRONIC KIDNEY DISEASE (HCC): ICD-10-CM

## 2023-08-11 DIAGNOSIS — I10 ESSENTIAL HYPERTENSION: ICD-10-CM

## 2023-08-11 DIAGNOSIS — R60.0 LOWER LEG EDEMA: ICD-10-CM

## 2023-08-11 LAB
2HR DELTA HS TROPONIN: 1 NG/L
4HR DELTA HS TROPONIN: 0 NG/L
ALBUMIN SERPL BCP-MCNC: 3.7 G/DL (ref 3.5–5)
ALP SERPL-CCNC: 54 U/L (ref 34–104)
ALT SERPL W P-5'-P-CCNC: 12 U/L (ref 7–52)
ANION GAP SERPL CALCULATED.3IONS-SCNC: 7 MMOL/L
APTT PPP: 31 SECONDS (ref 23–37)
AST SERPL W P-5'-P-CCNC: 15 U/L (ref 13–39)
BACTERIA UR QL AUTO: ABNORMAL /HPF
BASOPHILS # BLD AUTO: 0.06 THOUSANDS/ÂΜL (ref 0–0.1)
BASOPHILS NFR BLD AUTO: 0 % (ref 0–1)
BILIRUB SERPL-MCNC: 0.56 MG/DL (ref 0.2–1)
BILIRUB UR QL STRIP: NEGATIVE
BNP SERPL-MCNC: 148 PG/ML (ref 0–100)
BUN SERPL-MCNC: 35 MG/DL (ref 5–25)
CALCIUM SERPL-MCNC: 9 MG/DL (ref 8.4–10.2)
CARDIAC TROPONIN I PNL SERPL HS: 7 NG/L
CARDIAC TROPONIN I PNL SERPL HS: 7 NG/L
CARDIAC TROPONIN I PNL SERPL HS: 8 NG/L
CHLORIDE SERPL-SCNC: 97 MMOL/L (ref 96–108)
CLARITY UR: ABNORMAL
CO2 SERPL-SCNC: 33 MMOL/L (ref 21–32)
COLOR UR: YELLOW
CREAT SERPL-MCNC: 1.64 MG/DL (ref 0.6–1.3)
EOSINOPHIL # BLD AUTO: 0.12 THOUSAND/ÂΜL (ref 0–0.61)
EOSINOPHIL NFR BLD AUTO: 1 % (ref 0–6)
ERYTHROCYTE [DISTWIDTH] IN BLOOD BY AUTOMATED COUNT: 15.2 % (ref 11.6–15.1)
FLUAV RNA RESP QL NAA+PROBE: NEGATIVE
FLUBV RNA RESP QL NAA+PROBE: NEGATIVE
GFR SERPL CREATININE-BSD FRML MDRD: 31 ML/MIN/1.73SQ M
GLUCOSE SERPL-MCNC: 132 MG/DL (ref 65–140)
GLUCOSE SERPL-MCNC: 158 MG/DL (ref 65–140)
GLUCOSE SERPL-MCNC: 190 MG/DL (ref 65–140)
GLUCOSE SERPL-MCNC: 241 MG/DL (ref 65–140)
GLUCOSE UR STRIP-MCNC: NEGATIVE MG/DL
HCT VFR BLD AUTO: 37.6 % (ref 34.8–46.1)
HGB BLD-MCNC: 11.9 G/DL (ref 11.5–15.4)
HGB UR QL STRIP.AUTO: ABNORMAL
IMM GRANULOCYTES # BLD AUTO: 0.08 THOUSAND/UL (ref 0–0.2)
IMM GRANULOCYTES NFR BLD AUTO: 1 % (ref 0–2)
INR PPP: 1.28 (ref 0.84–1.19)
KETONES UR STRIP-MCNC: NEGATIVE MG/DL
LACTATE SERPL-SCNC: 1.2 MMOL/L (ref 0.5–2)
LEUKOCYTE ESTERASE UR QL STRIP: ABNORMAL
LYMPHOCYTES # BLD AUTO: 1.39 THOUSANDS/ÂΜL (ref 0.6–4.47)
LYMPHOCYTES NFR BLD AUTO: 8 % (ref 14–44)
MCH RBC QN AUTO: 28.4 PG (ref 26.8–34.3)
MCHC RBC AUTO-ENTMCNC: 31.6 G/DL (ref 31.4–37.4)
MCV RBC AUTO: 90 FL (ref 82–98)
MONOCYTES # BLD AUTO: 1.14 THOUSAND/ÂΜL (ref 0.17–1.22)
MONOCYTES NFR BLD AUTO: 7 % (ref 4–12)
NEUTROPHILS # BLD AUTO: 14.09 THOUSANDS/ÂΜL (ref 1.85–7.62)
NEUTS SEG NFR BLD AUTO: 83 % (ref 43–75)
NITRITE UR QL STRIP: NEGATIVE
NON-SQ EPI CELLS URNS QL MICRO: ABNORMAL /HPF
NRBC BLD AUTO-RTO: 0 /100 WBCS
PH UR STRIP.AUTO: 5.5 [PH]
PLATELET # BLD AUTO: 171 THOUSANDS/UL (ref 149–390)
PMV BLD AUTO: 10.9 FL (ref 8.9–12.7)
POTASSIUM SERPL-SCNC: 4.5 MMOL/L (ref 3.5–5.3)
PROCALCITONIN SERPL-MCNC: 0.08 NG/ML
PROT SERPL-MCNC: 7.3 G/DL (ref 6.4–8.4)
PROT UR STRIP-MCNC: ABNORMAL MG/DL
PROTHROMBIN TIME: 16.1 SECONDS (ref 11.6–14.5)
RBC # BLD AUTO: 4.19 MILLION/UL (ref 3.81–5.12)
RBC #/AREA URNS AUTO: ABNORMAL /HPF
RSV RNA RESP QL NAA+PROBE: NEGATIVE
SARS-COV-2 RNA RESP QL NAA+PROBE: NEGATIVE
SODIUM SERPL-SCNC: 137 MMOL/L (ref 135–147)
SP GR UR STRIP.AUTO: <=1.005 (ref 1–1.03)
TSH SERPL DL<=0.05 MIU/L-ACNC: 2.37 UIU/ML (ref 0.45–4.5)
UROBILINOGEN UR QL STRIP.AUTO: 0.2 E.U./DL
WBC # BLD AUTO: 16.88 THOUSAND/UL (ref 4.31–10.16)
WBC #/AREA URNS AUTO: ABNORMAL /HPF

## 2023-08-11 PROCEDURE — 82948 REAGENT STRIP/BLOOD GLUCOSE: CPT

## 2023-08-11 PROCEDURE — 99285 EMERGENCY DEPT VISIT HI MDM: CPT

## 2023-08-11 PROCEDURE — 94760 N-INVAS EAR/PLS OXIMETRY 1: CPT

## 2023-08-11 PROCEDURE — 96375 TX/PRO/DX INJ NEW DRUG ADDON: CPT

## 2023-08-11 PROCEDURE — 87086 URINE CULTURE/COLONY COUNT: CPT | Performed by: EMERGENCY MEDICINE

## 2023-08-11 PROCEDURE — 87077 CULTURE AEROBIC IDENTIFY: CPT | Performed by: EMERGENCY MEDICINE

## 2023-08-11 PROCEDURE — 84484 ASSAY OF TROPONIN QUANT: CPT | Performed by: NURSE PRACTITIONER

## 2023-08-11 PROCEDURE — 85610 PROTHROMBIN TIME: CPT | Performed by: EMERGENCY MEDICINE

## 2023-08-11 PROCEDURE — 96365 THER/PROPH/DIAG IV INF INIT: CPT

## 2023-08-11 PROCEDURE — 0241U HB NFCT DS VIR RESP RNA 4 TRGT: CPT | Performed by: EMERGENCY MEDICINE

## 2023-08-11 PROCEDURE — 84443 ASSAY THYROID STIM HORMONE: CPT | Performed by: EMERGENCY MEDICINE

## 2023-08-11 PROCEDURE — 93005 ELECTROCARDIOGRAM TRACING: CPT

## 2023-08-11 PROCEDURE — 87040 BLOOD CULTURE FOR BACTERIA: CPT | Performed by: EMERGENCY MEDICINE

## 2023-08-11 PROCEDURE — 99285 EMERGENCY DEPT VISIT HI MDM: CPT | Performed by: EMERGENCY MEDICINE

## 2023-08-11 PROCEDURE — 81001 URINALYSIS AUTO W/SCOPE: CPT | Performed by: EMERGENCY MEDICINE

## 2023-08-11 PROCEDURE — 84145 PROCALCITONIN (PCT): CPT | Performed by: EMERGENCY MEDICINE

## 2023-08-11 PROCEDURE — 84484 ASSAY OF TROPONIN QUANT: CPT | Performed by: EMERGENCY MEDICINE

## 2023-08-11 PROCEDURE — 83880 ASSAY OF NATRIURETIC PEPTIDE: CPT | Performed by: EMERGENCY MEDICINE

## 2023-08-11 PROCEDURE — 80053 COMPREHEN METABOLIC PANEL: CPT | Performed by: EMERGENCY MEDICINE

## 2023-08-11 PROCEDURE — 85730 THROMBOPLASTIN TIME PARTIAL: CPT | Performed by: EMERGENCY MEDICINE

## 2023-08-11 PROCEDURE — 99222 1ST HOSP IP/OBS MODERATE 55: CPT | Performed by: FAMILY MEDICINE

## 2023-08-11 PROCEDURE — 85025 COMPLETE CBC W/AUTO DIFF WBC: CPT | Performed by: EMERGENCY MEDICINE

## 2023-08-11 PROCEDURE — 83605 ASSAY OF LACTIC ACID: CPT | Performed by: EMERGENCY MEDICINE

## 2023-08-11 PROCEDURE — 36415 COLL VENOUS BLD VENIPUNCTURE: CPT | Performed by: EMERGENCY MEDICINE

## 2023-08-11 PROCEDURE — 71045 X-RAY EXAM CHEST 1 VIEW: CPT

## 2023-08-11 RX ORDER — PANTOPRAZOLE SODIUM 40 MG/1
40 TABLET, DELAYED RELEASE ORAL DAILY
Status: DISCONTINUED | OUTPATIENT
Start: 2023-08-12 | End: 2023-08-22 | Stop reason: HOSPADM

## 2023-08-11 RX ORDER — INSULIN LISPRO 100 [IU]/ML
2-12 INJECTION, SOLUTION INTRAVENOUS; SUBCUTANEOUS
Status: DISCONTINUED | OUTPATIENT
Start: 2023-08-11 | End: 2023-08-18

## 2023-08-11 RX ORDER — INSULIN LISPRO 100 [IU]/ML
10 INJECTION, SOLUTION INTRAVENOUS; SUBCUTANEOUS
Status: DISCONTINUED | OUTPATIENT
Start: 2023-08-11 | End: 2023-08-18

## 2023-08-11 RX ORDER — INSULIN LISPRO 100 [IU]/ML
2-12 INJECTION, SOLUTION INTRAVENOUS; SUBCUTANEOUS
Status: DISCONTINUED | OUTPATIENT
Start: 2023-08-12 | End: 2023-08-18

## 2023-08-11 RX ORDER — CEFTRIAXONE 1 G/50ML
1000 INJECTION, SOLUTION INTRAVENOUS EVERY 24 HOURS
Status: DISCONTINUED | OUTPATIENT
Start: 2023-08-12 | End: 2023-08-13

## 2023-08-11 RX ORDER — MONTELUKAST SODIUM 10 MG/1
10 TABLET ORAL
Status: DISCONTINUED | OUTPATIENT
Start: 2023-08-11 | End: 2023-08-22 | Stop reason: HOSPADM

## 2023-08-11 RX ORDER — PREGABALIN 100 MG/1
100 CAPSULE ORAL 2 TIMES DAILY
Status: DISCONTINUED | OUTPATIENT
Start: 2023-08-11 | End: 2023-08-16

## 2023-08-11 RX ORDER — INSULIN GLARGINE 100 [IU]/ML
25 INJECTION, SOLUTION SUBCUTANEOUS
Status: DISCONTINUED | OUTPATIENT
Start: 2023-08-11 | End: 2023-08-18

## 2023-08-11 RX ORDER — ATORVASTATIN CALCIUM 10 MG/1
20 TABLET, FILM COATED ORAL
Status: DISCONTINUED | OUTPATIENT
Start: 2023-08-11 | End: 2023-08-17

## 2023-08-11 RX ORDER — ACETAMINOPHEN 325 MG/1
650 TABLET ORAL EVERY 6 HOURS PRN
Status: DISCONTINUED | OUTPATIENT
Start: 2023-08-11 | End: 2023-08-16

## 2023-08-11 RX ORDER — FLUTICASONE PROPIONATE 50 MCG
1 SPRAY, SUSPENSION (ML) NASAL DAILY
Status: DISCONTINUED | OUTPATIENT
Start: 2023-08-12 | End: 2023-08-22 | Stop reason: HOSPADM

## 2023-08-11 RX ORDER — ACETAMINOPHEN 325 MG/1
975 TABLET ORAL ONCE
Status: COMPLETED | OUTPATIENT
Start: 2023-08-11 | End: 2023-08-11

## 2023-08-11 RX ORDER — CEFTRIAXONE 1 G/50ML
1000 INJECTION, SOLUTION INTRAVENOUS ONCE
Status: COMPLETED | OUTPATIENT
Start: 2023-08-11 | End: 2023-08-11

## 2023-08-11 RX ORDER — FLUTICASONE FUROATE AND VILANTEROL 100; 25 UG/1; UG/1
1 POWDER RESPIRATORY (INHALATION)
Status: DISCONTINUED | OUTPATIENT
Start: 2023-08-12 | End: 2023-08-22 | Stop reason: HOSPADM

## 2023-08-11 RX ORDER — ONDANSETRON 2 MG/ML
4 INJECTION INTRAMUSCULAR; INTRAVENOUS ONCE
Status: COMPLETED | OUTPATIENT
Start: 2023-08-11 | End: 2023-08-11

## 2023-08-11 RX ORDER — AZITHROMYCIN 250 MG/1
500 TABLET, FILM COATED ORAL EVERY 24 HOURS
Status: DISCONTINUED | OUTPATIENT
Start: 2023-08-11 | End: 2023-08-12

## 2023-08-11 RX ADMIN — SODIUM CHLORIDE 500 ML: 0.9 INJECTION, SOLUTION INTRAVENOUS at 14:42

## 2023-08-11 RX ADMIN — MONTELUKAST 10 MG: 10 TABLET, FILM COATED ORAL at 21:10

## 2023-08-11 RX ADMIN — ACETAMINOPHEN 975 MG: 325 TABLET ORAL at 12:44

## 2023-08-11 RX ADMIN — SODIUM CHLORIDE 1000 ML: 0.9 INJECTION, SOLUTION INTRAVENOUS at 14:56

## 2023-08-11 RX ADMIN — APIXABAN 5 MG: 5 TABLET, FILM COATED ORAL at 17:20

## 2023-08-11 RX ADMIN — INSULIN LISPRO 10 UNITS: 100 INJECTION, SOLUTION INTRAVENOUS; SUBCUTANEOUS at 17:21

## 2023-08-11 RX ADMIN — PREGABALIN 100 MG: 100 CAPSULE ORAL at 17:23

## 2023-08-11 RX ADMIN — INSULIN LISPRO 4 UNITS: 100 INJECTION, SOLUTION INTRAVENOUS; SUBCUTANEOUS at 21:10

## 2023-08-11 RX ADMIN — CEFTRIAXONE 1000 MG: 1 INJECTION, SOLUTION INTRAVENOUS at 13:17

## 2023-08-11 RX ADMIN — AZITHROMYCIN MONOHYDRATE 500 MG: 250 TABLET ORAL at 17:20

## 2023-08-11 RX ADMIN — ATORVASTATIN CALCIUM 20 MG: 20 TABLET, FILM COATED ORAL at 17:20

## 2023-08-11 RX ADMIN — INSULIN GLARGINE 25 UNITS: 100 INJECTION, SOLUTION SUBCUTANEOUS at 21:09

## 2023-08-11 RX ADMIN — SODIUM CHLORIDE 1000 ML: 0.9 INJECTION, SOLUTION INTRAVENOUS at 17:31

## 2023-08-11 RX ADMIN — ONDANSETRON 4 MG: 2 INJECTION INTRAMUSCULAR; INTRAVENOUS at 12:46

## 2023-08-11 NOTE — ASSESSMENT & PLAN NOTE
Patient reports feeling unwell for 2 days with intermittent shortness of breath. This morning when she woke up her heart was racing prompting her to come to the emergency department. On arrival to the ER she was found to have fever of 103. She meets sepsis criteria with fever, tachycardia, leukocytosis. She had a chest x-ray which revealed a right base infiltrate. Flu and COVID-negative  · Admit to medicine  · Continue Rocephin, add azithromycin  · Given BMI of 49.9, will give IV fluid based on ideal body weight, will need to receive an additional liter of IV fluids which has been ordered  · Lactic normal  · Procalcitonin 0.08, repeat in a.m.   · Blood and urine cultures pending  · Send urine for strep and legionella  · Supportive therapies with missy respiratory protocol

## 2023-08-11 NOTE — ASSESSMENT & PLAN NOTE
Lab Results   Component Value Date    EGFR 31 08/11/2023    EGFR 27 04/30/2023    EGFR 30 04/29/2023    CREATININE 1.64 (H) 08/11/2023    CREATININE 1.82 (H) 04/30/2023    CREATININE 1.67 (H) 04/29/2023   Creatinine at baseline

## 2023-08-11 NOTE — ED PROVIDER NOTES
History  Chief Complaint   Patient presents with   • Fever     Pt arrives with her niece who states this AM she was in afib and acting confused and having chills. Pt is a 81yo F who presents for A-fib and shortness of breath. Patient reports she has history of paroxysmal A-fib and states that this morning she noticed that she went into A-fib. Patient's heart rate was elevated and she had associated shortness of breath. Patient also reports nausea without vomiting at that time. Patient's niece states that she is not acting her normal self during this time and therefore brought her in for further evaluation. Patient's niece also reports history of UTI with similar presentation. Patient denies any urinary symptoms. Patient denies diarrhea but does report constipation. Patient denying any abdominal pain. Patient states she overall feels weak and unwell but does not have specific complaints. Patient reports she takes her medications regularly with no recent changes. Prior to Admission Medications   Prescriptions Last Dose Informant Patient Reported? Taking? B Complex-C-Folic Acid (triphrocaps) 1 MG CAPS 2023 at unknown Self No Yes   Sig: Take 1 capsule (1 mg total) by mouth daily   Fluticasone-Salmeterol (Wixela Inhub) 250-50 mcg/dose inhaler  Self No No   Sig: Inhale 1 puff 2 (two) times a day Rinse mouth after use. Insulin Pen Needle (BD Pen Needle Pilar 2nd Gen) 32G X 4 MM MISC  Self No No   Sig: For use with insulin pen. Pharmacy may dispense brand covered by insurance.    Insulin Pen Needle (NovoFine Autocover) 30G X 8 MM MISC  Self No No   Sig: Inject under the skin daily   Insulin Pen Needle 30G X 8 MM MISC  Self Yes No   Si (two) times a day   Insulin Pen Needle 31G X 8 MM MISC  Self No No   Sig: Use daily Inject under the skin   Lancets (onetouch ultrasoft) lancets  Self No No   Sig: Use once daily   NovoFine Autocover Pen Needle 30G X 8 MM MISC  Self No No   Sig: USE TWICE A DAY   Toujeo SoloStar 300 units/mL CONCENTRATED U-300 injection pen (1-unit dial) 8/10/2023 at unknown Self No Yes   Sig: inject 25 units subcutaneously daily at bedtime   albuterol (PROVENTIL HFA,VENTOLIN HFA) 90 mcg/act inhaler Past Month at unknown Self Yes Yes   Sig: Inhale 2 puffs every 6 (six) hours as needed for wheezing   ammonium lactate (LAC-HYDRIN) 12 % lotion 8/10/2023 Self Yes Yes   Sig: APPLY TOPICALLY TO AFFECTED AREAS twice a day   apixaban (ELIQUIS) 5 mg 2023 at 1800 Self No Yes   Sig: Take 1 tablet (5 mg total) by mouth 2 (two) times a day   fluticasone (FLONASE) 50 mcg/act nasal spray 8/10/2023 at unknown Self No Yes   Si spray into each nostril daily   glucose blood (OneTouch Verio) test strip  Self No No   Sig: Use 1 each 4 (four) times a day Use as instructed   insulin lispro (HumaLOG KwikPen) 100 units/mL injection pen 2023 Self No Yes   Sig: Use 10 units prior to each meal +1 unit per 50 above 150 mg/dL   metoprolol succinate (TOPROL-XL) 50 mg 24 hr tablet 2023 at unknown Self No Yes   Sig: take 1 tablet by mouth twice a day   montelukast (SINGULAIR) 10 mg tablet Unknown at unknown Self No No   Sig: take 1 tablet by mouth at bedtime   nystatin (MYCOSTATIN) powder 8/10/2023 at unknown Self No Yes   Sig: Apply topically 2 (two) times a day   pantoprazole (PROTONIX) 40 mg tablet Unknown at unknown Self No No   Sig: Take 1 tablet (40 mg total) by mouth daily   pregabalin (LYRICA) 100 mg capsule 2023 at 1800 Self No Yes   Sig: take 1 capsule by mouth twice a day   rosuvastatin (CRESTOR) 10 MG tablet 2023 at unknown Self No Yes   Sig: take 1 tablet by mouth once daily   semaglutide, 1 mg/dose, (Ozempic, 1 MG/DOSE,) 4 mg/3 mL injection pen Not Taking at unknown Self No No   Sig: Inject 0.75 mL (1 mg total) under the skin every 7 days   Patient not taking: Reported on 2023   torsemide (DEMADEX) 20 mg tablet 2023 at unknown Self No Yes   Sig: TAKE 2 TABLETS BY MOUTH IN THE A.M.       Facility-Administered Medications: None       Past Medical History:   Diagnosis Date   • SEBASTIAN (acute kidney injury) ([address]) 01/23/2023   • Anemia    • Asthma    • Atrial fibrillation ([address])    • Chronic kidney disease    • COVID-19 January 2022   • Diabetes mellitus ([address])    • GERD (gastroesophageal reflux disease)    • Hyperlipidemia    • Hypertension    • PONV (postoperative nausea and vomiting)    • Sleep apnea     wear BIPAP   • SVT (supraventricular tachycardia) ([address])        Past Surgical History:   Procedure Laterality Date   • APPENDECTOMY     • BREAST BIOPSY Right     years ago when she was 23   • BREAST BIOPSY Right 03/13/2023   • BREAST LUMPECTOMY Right 5/30/2023    Procedure: RIGHT BREAST KRIS  DIRECTED LUMPECTOMY - Silvia Forbes;  Surgeon: Anup Vera MD;  Location: [facility];  Service: Surgical Oncology   • CYSTOSCOPY W/ LASER LITHOTRIPSY Left 07/12/2016    Procedure: CYSTOSCOPY URETEROSCOPY WITH LITHOTRIPSY HOLMIUM LASER, RETROGRADE PYELOGRAM AND INSERTION STENT URETERAL;  Surgeon: Rica Watson MD;  Location: [facility];  Service:    • DILATION AND CURETTAGE OF UTERUS     • IR DRAINAGE TUBE PLACEMENT  8/18/2023   • IR THORACENTESIS  03/18/2022   • JOINT REPLACEMENT      right knee   • KNEE ARTHROPLASTY Right    • MAMMO NEEDLE LOCALIZATION LEFT (ALL INC) EACH ADD Right 4/24/2023   • MAMMO STEREOTACTIC BREAST BIOPSY RIGHT (ALL INC) Right 03/13/2023    High Risk Lesion   • NY ARTHROPLASTY GLENOHUMERAL JOINT TOTAL SHOULDER Left 03/01/2022    Procedure: ARTHROPLASTY SHOULDER REVERSE;  Surgeon: Yvan Power MD;  Location: [facility];  Service: Orthopedics   • NY CYSTO BLADDER W/URETERAL CATHETERIZATION Left 06/29/2016    Procedure: CYSTOSCOPY RETROGRADE PYELOGRAM WITH INSERTION STENT URETERAL, left;  Surgeon: Rica Watson MD;  Location: [facility];  Service: Urology   • SHOULDER ARTHROTOMY Left        Family History   Problem Relation Age of Onset   • Heart disease Mother    • Diabetes Father    • Thyroid cancer Sister    • Heart disease Brother    • Diabetes Brother    • Heart disease Brother    • Heart disease Brother    • Emphysema Maternal Grandmother    • Heart disease Family      I have reviewed and agree with the history as documented. E-Cigarette/Vaping   • E-Cigarette Use Never User      E-Cigarette/Vaping Substances   • Nicotine No    • THC No    • CBD No    • Flavoring No    • Other No    • Unknown No      Social History     Tobacco Use   • Smoking status: Former     Packs/day: 1.00     Years: 20.00     Total pack years: 20.00     Types: Cigarettes     Quit date: 1996     Years since quittin.1     Passive exposure: Past   • Smokeless tobacco: Never   Vaping Use   • Vaping Use: Never used   Substance Use Topics   • Alcohol use: Not Currently     Comment: rarely   • Drug use: Never       Review of Systems   Constitutional: Positive for fatigue. Respiratory: Positive for shortness of breath. Cardiovascular: Positive for palpitations. Skin: Positive for wound (buttocks). All other systems reviewed and are negative. Physical Exam  Physical Exam  Vitals reviewed. Constitutional:       General: She is not in acute distress. Appearance: She is well-developed. She is obese. She is ill-appearing. She is not diaphoretic. HENT:      Head: Normocephalic and atraumatic. Right Ear: External ear normal.      Left Ear: External ear normal.      Nose: Nose normal.      Mouth/Throat:      Pharynx: Oropharynx is clear. Eyes:      Extraocular Movements: Extraocular movements intact. Pupils: Pupils are equal, round, and reactive to light. Cardiovascular:      Rate and Rhythm: Regular rhythm. Tachycardia present. Heart sounds: Normal heart sounds. Pulmonary:      Effort: Pulmonary effort is normal. No respiratory distress. Breath sounds: Normal breath sounds. No wheezing, rhonchi or rales. Abdominal:      General: There is no distension. Palpations: Abdomen is soft. Tenderness: There is no abdominal tenderness. There is no guarding or rebound. Musculoskeletal:         General: Normal range of motion. Cervical back: Normal range of motion and neck supple. Right lower leg: Edema (Symmetric; 2+) present. Left lower leg: Edema (Symmetric; 2+) present. Skin:     General: Skin is warm and dry. Capillary Refill: Capillary refill takes less than 2 seconds. Coloration: Skin is not pale. Findings: No erythema or rash. Neurological:      General: No focal deficit present. Mental Status: She is alert and oriented to person, place, and time. Comments: Poor historian with occasionally slow responses but alert and oriented x3, following all commands, and moving all extremities   Psychiatric:         Speech: Speech normal.         Behavior: Behavior is cooperative.          Vital Signs  ED Triage Vitals   Temperature Pulse Respirations Blood Pressure SpO2   08/11/23 1222 08/11/23 1222 08/11/23 1222 08/11/23 1222 08/11/23 1222   (!) 103.1 °F (39.5 °C) 98 20 (!) 179/108 96 %      Temp Source Heart Rate Source Patient Position - Orthostatic VS BP Location FiO2 (%)   08/11/23 1222 08/11/23 1222 08/11/23 1222 08/11/23 1222 --   Tympanic Monitor Sitting Right arm       Pain Score       08/11/23 1244       Med Not Given for Pain - for MAR use only           Vitals:    08/20/23 2055 08/20/23 2239 08/21/23 0307 08/21/23 0759   BP: 151/64 117/52 121/55 130/56   Pulse: 69 60 62 60   Patient Position - Orthostatic VS:             Visual Acuity  Visual Acuity    Flowsheet Row Most Recent Value   L Pupil Size (mm) 4   R Pupil Size (mm) 4   L Pupil Shape Round   R Pupil Shape Round          ED Medications  Medications   fluticasone (FLONASE) 50 mcg/act nasal spray 1 spray (1 spray Nasal Given 8/21/23 0842)   Fluticasone Furoate-Vilanterol 100-25 mcg/actuation 1 puff (1 puff Inhalation Given 8/21/23 0842)   montelukast (SINGULAIR) tablet 10 mg (10 mg Oral Given 8/20/23 2224)   pantoprazole (PROTONIX) EC tablet 40 mg (40 mg Oral Given 8/21/23 0841)   saccharomyces boulardii (FLORASTOR) capsule 250 mg (250 mg Oral Given 8/21/23 0841)   nystatin (MYCOSTATIN) powder ( Topical Given 8/21/23 1002)   ammonium lactate (LAC-HYDRIN) 12 % lotion 1 Application (1 Application Topical Given 8/14/23 0824)   ondansetron (ZOFRAN) injection 4 mg (4 mg Intravenous Given 8/19/23 1547)   aspirin chewable tablet 81 mg (81 mg Oral Given 8/21/23 0841)   atorvastatin (LIPITOR) tablet 40 mg (40 mg Oral Given 8/20/23 1700)   acetaminophen (TYLENOL) tablet 650 mg (650 mg Oral Given 8/20/23 1348)   metoprolol (LOPRESSOR) injection 5 mg (5 mg Intravenous Given 8/19/23 0412)   midazolam (VERSED) 2 mg/2 mL injection **ADS Override Pull** (has no administration in time range)   fentanyl citrate (PF) 100 MCG/2ML **ADS Override Pull** (has no administration in time range)   insulin lispro (HumaLOG) 100 units/mL subcutaneous injection 5 Units (5 Units Subcutaneous Given 8/21/23 1146)   insulin glargine (LANTUS) subcutaneous injection 25 Units 0.25 mL (25 Units Subcutaneous Given 8/20/23 2224)   insulin lispro (HumaLOG) 100 units/mL subcutaneous injection 1-5 Units (1 Units Subcutaneous Given 8/21/23 1146)   insulin lispro (HumaLOG) 100 units/mL subcutaneous injection 1-5 Units (1 Units Subcutaneous Given 8/20/23 2225)   insulin lispro (HumaLOG) 100 units/mL subcutaneous injection 1-5 Units (1 Units Subcutaneous Given 8/21/23 0202)   polyethylene glycol (MIRALAX) packet 17 g (17 g Oral Given 8/21/23 1001)   bisacodyl (DULCOLAX) rectal suppository 10 mg ( Rectal MAR Unhold 8/19/23 1401)   metoprolol tartrate (LOPRESSOR) tablet 50 mg (50 mg Oral Given 8/21/23 4682)   senna (SENOKOT) tablet 17.2 mg (17.2 mg Oral Given 8/20/23 2224)   vancomycin (VANCOCIN) IVPB (premix in dextrose) 1,000 mg 200 mL (has no administration in time range)   torsemide (DEMADEX) tablet 10 mg (10 mg Oral Given 8/21/23 1037)   acetaminophen (TYLENOL) tablet 975 mg (975 mg Oral Given 8/11/23 1244)   ondansetron (ZOFRAN) injection 4 mg (4 mg Intravenous Given 8/11/23 1246)   cefTRIAXone (ROCEPHIN) IVPB (premix in dextrose) 1,000 mg 50 mL (0 mg Intravenous Stopped 8/11/23 1440)   sodium chloride 0.9 % bolus 500 mL (0 mL Intravenous Stopped 8/11/23 1523)   sodium chloride 0.9 % bolus 1,000 mL (0 mL Intravenous Stopped 8/11/23 1523)   sodium chloride 0.9 % bolus 1,000 mL (1,000 mL Intravenous New Bag 8/11/23 1731)   furosemide (LASIX) injection 20 mg (20 mg Intravenous Given 8/12/23 1020)   albumin human (FLEXBUMIN) 5 % injection 12.5 g (12.5 g Intravenous New Bag 8/13/23 0036)   albumin human (FLEXBUMIN) 25 % injection 12.5 g (12.5 g Intravenous New Bag 8/13/23 2132)   furosemide (LASIX) injection 40 mg (40 mg Intravenous Given 8/15/23 1657)   perflutren lipid microsphere (DEFINITY) injection (2 mL/min Intravenous Given 8/15/23 1118)   calcium gluconate 1 g in sodium chloride 0.9% 50 mL (premix) (0 g Intravenous Stopped 8/16/23 1210)   sodium chloride 0.9 % bolus 250 mL (0 mL Intravenous Stopped 8/16/23 1530)   vancomycin (VANCOCIN) 2,000 mg in sodium chloride 0.9 % 500 mL IVPB (0 mg Intravenous Stopped 8/16/23 1615)   sodium chloride 0.9 % bolus 1,870 mL (0 mL Intravenous Stopped 8/16/23 1720)   sodium chloride 0.9 % bolus 250 mL (0 mL Intravenous Stopped 8/16/23 1625)   vancomycin (VANCOCIN) 1,250 mg in sodium chloride 0.9 % 250 mL IVPB (1,250 mg Intravenous New Bag 8/17/23 1015)   perflutren lipid microsphere (DEFINITY) injection (0.4 mL/min Intravenous Given 8/17/23 1048)   metoprolol (LOPRESSOR) injection 5 mg (5 mg Intravenous Given 8/17/23 1428)   Gadoterate Meglumine SOLN 20 mL (20 mL Intravenous Given 8/17/23 1913)   vancomycin (VANCOCIN) 1,250 mg in sodium chloride 0.9 % 250 mL IVPB (0 mg Intravenous Stopped 8/18/23 1200)   midazolam (VERSED) injection (1 mg Intravenous Given 8/18/23 1602)   fentanyl citrate (PF) 100 MCG/2ML (50 mcg Intravenous Given 8/18/23 1603)   insulin glargine (LANTUS) subcutaneous injection 12 Units 0.12 mL (12 Units Subcutaneous Given 8/18/23 2152)   Sodium Zirconium Cyclosilicate (Lokelma) 10 g (10 g Oral Given 8/20/23 1034)   vancomycin (VANCOCIN) IVPB (premix in dextrose) 1,000 mg 200 mL (1,000 mg Intravenous New Bag 8/20/23 1108)       Diagnostic Studies  Results Reviewed     Procedure Component Value Units Date/Time    Blood culture #1 [[id number]] Collected: 08/11/23 1236    Lab Status: Final result Specimen: Blood from Arm, Right Updated: 08/16/23 2001     Blood Culture No Growth After 5 Days. Blood culture #2 [[id number]] Collected: 08/11/23 1236    Lab Status: Final result Specimen: Blood from Hand, Left Updated: 08/16/23 2001     Blood Culture No Growth After 5 Days.     Urine culture [[id number]]  (Abnormal)  (Susceptibility) Collected: 08/11/23 1308    Lab Status: Final result Specimen: Urine Updated: 08/13/23 2244     Urine Culture >100,000 cfu/ml Gardnerella vaginalis      <10,000 cfu/ml    Susceptibility     Gardnerella vaginalis (3)     Antibiotic Interpretation Microscan Method Status    ZID Performed  Yes PAVITHRA Final                   HS Troponin I 4hr [[id number]]  (Normal) Collected: 08/11/23 1903    Lab Status: Final result Specimen: Blood from Hand, Right Updated: 08/11/23 1932     hs TnI 4hr 7 ng/L      Delta 4hr hsTnI 0 ng/L     TSH, 3rd generation with Free T4 reflex [[id number]]  (Normal) Collected: 08/11/23 1236    Lab Status: Final result Specimen: Blood from Arm, Right Updated: 08/11/23 1613     TSH 3RD GENERATON 2.374 uIU/mL     HS Troponin I 2hr [[id number]]  (Normal) Collected: 08/11/23 1442    Lab Status: Final result Specimen: Blood from Arm, Right Updated: 08/11/23 1516     hs TnI 2hr 8 ng/L      Delta 2hr hsTnI 1 ng/L     Fingerstick Glucose (POCT) [[id number]]  (Abnormal) Collected: 08/11/23 1459    Lab Status: Final result Updated: 08/11/23 1506     POC Glucose 158 mg/dl     Urine Microscopic [[id number]]  (Abnormal) Collected: 08/11/23 1308    Lab Status: Final result Specimen: Urine, Straight Cath Updated: 08/11/23 1344     RBC, UA 0-1 /hpf      WBC, UA 4-10 /hpf      Epithelial Cells Occasional /hpf      Bacteria, UA None Seen /hpf     UA w Reflex to Microscopic w Reflex to Culture [[id number]]  (Abnormal) Collected: 08/11/23 1308    Lab Status: Final result Specimen: Urine, Straight Cath Updated: 08/11/23 1343     Color, UA Yellow     Clarity, UA Slightly Cloudy     Specific Gravity, UA <=1.005     pH, UA 5.5     Leukocytes, UA Small     Nitrite, UA Negative     Protein, UA Trace mg/dl      Glucose, UA Negative mg/dl      Ketones, UA Negative mg/dl      Urobilinogen, UA 0.2 E.U./dl      Bilirubin, UA Negative     Occult Blood, UA Small    FLU/RSV/COVID - if FLU/RSV clinically relevant [[id number]]  (Normal) Collected: 08/11/23 1236    Lab Status: Final result Specimen: Nares from Nose Updated: 08/11/23 1331     SARS-CoV-2 Negative     INFLUENZA A PCR Negative     INFLUENZA B PCR Negative     RSV PCR Negative    Narrative:      FOR PEDIATRIC PATIENTS - copy/paste COVID Guidelines URL to browser: [email or web address]    SARS-CoV-2 assay is a Nucleic Acid Amplification assay intended for the  qualitative detection of nucleic acid from SARS-CoV-2 in nasopharyngeal  swabs. Results are for the presumptive identification of SARS-CoV-2 RNA. Positive results are indicative of infection with SARS-CoV-2, the virus  causing COVID-19, but do not rule out bacterial infection or co-infection  with other viruses. Laboratories within the [facility] and its  territories are required to report all positive results to the appropriate  public health authorities.  Negative results do not preclude SARS-CoV-2  infection and should not be used as the sole basis for treatment or other  patient management decisions. Negative results must be combined with  clinical observations, patient history, and epidemiological information. This test has not been FDA cleared or approved. This test has been authorized by FDA under an Emergency Use Authorization  (EUA). This test is only authorized for the duration of time the  declaration that circumstances exist justifying the authorization of the  emergency use of an in vitro diagnostic tests for detection of SARS-CoV-2  virus and/or diagnosis of COVID-19 infection under section 564(b)(1) of  the Act, 21 U. S.C. 776DNK-5(J)(3), unless the authorization is terminated  or revoked sooner. The test has been validated but independent review by FDA  and CLIA is pending. Test performed using [employer]: This RT-PCR assay targets N2,  a region unique to SARS-CoV-2. A conserved region in the E-gene was chosen  for pan-Sarbecovirus detection which includes SARS-CoV-2. According to CMS-2020-01-R, this platform meets the definition of high-throughput technology. Lactic acid [[id number]]  (Normal) Collected: 08/11/23 1236    Lab Status: Final result Specimen: Blood from Arm, Right Updated: 08/11/23 1324     LACTIC ACID 1.2 mmol/L     Narrative:      Result may be elevated if tourniquet was used during collection.     Procalcitonin [[id number]]  (Normal) Collected: 08/11/23 1236    Lab Status: Final result Specimen: Blood from Arm, Right Updated: 08/11/23 1316     Procalcitonin 0.08 ng/ml     HS Troponin 0hr (reflex protocol) [[id number]]  (Normal) Collected: 08/11/23 1236    Lab Status: Final result Specimen: Blood from Arm, Right Updated: 08/11/23 1312     hs TnI 0hr 7 ng/L     B-Type Natriuretic Peptide(BNP) [[id number]]  (Abnormal) Collected: 08/11/23 1236    Lab Status: Final result Specimen: Blood from Arm, Right Updated: 08/11/23 1311      pg/mL     Comprehensive metabolic panel [[id number]]  (Abnormal) Collected: 08/11/23 1236    Lab Status: Final result Specimen: Blood from Arm, Right Updated: 08/11/23 1309     Sodium 137 mmol/L      Potassium 4.5 mmol/L      Chloride 97 mmol/L      CO2 33 mmol/L      ANION GAP 7 mmol/L      BUN 35 mg/dL      Creatinine 1.64 mg/dL      Glucose 190 mg/dL      Calcium 9.0 mg/dL      AST 15 U/L      ALT 12 U/L      Alkaline Phosphatase 54 U/L      Total Protein 7.3 g/dL      Albumin 3.7 g/dL      Total Bilirubin 0.56 mg/dL      eGFR 31 ml/min/1.73sq m     Narrative:      National Kidney Disease Foundation guidelines for Chronic Kidney Disease (CKD):   •  Stage 1 with normal or high GFR (GFR > 90 mL/min/1.73 square meters)  •  Stage 2 Mild CKD (GFR = 60-89 mL/min/1.73 square meters)  •  Stage 3A Moderate CKD (GFR = 45-59 mL/min/1.73 square meters)  •  Stage 3B Moderate CKD (GFR = 30-44 mL/min/1.73 square meters)  •  Stage 4 Severe CKD (GFR = 15-29 mL/min/1.73 square meters)  •  Stage 5 End Stage CKD (GFR <15 mL/min/1.73 square meters)  Note: GFR calculation is accurate only with a steady state creatinine    Protime-INR [[id number]]  (Abnormal) Collected: 08/11/23 1236    Lab Status: Final result Specimen: Blood from Arm, Right Updated: 08/11/23 1302     Protime 16.1 seconds      INR 1.28    APTT [[id number]]  (Normal) Collected: 08/11/23 1236    Lab Status: Final result Specimen: Blood from Arm, Right Updated: 08/11/23 1302     PTT 31 seconds     CBC and differential [[id number]]  (Abnormal) Collected: 08/11/23 1236    Lab Status: Final result Specimen: Blood from Arm, Right Updated: 08/11/23 1249     WBC 16.88 Thousand/uL      RBC 4.19 Million/uL      Hemoglobin 11.9 g/dL      Hematocrit 37.6 %      MCV 90 fL      MCH 28.4 pg      MCHC 31.6 g/dL      RDW 15.2 %      MPV 10.9 fL      Platelets 490 Thousands/uL      nRBC 0 /100 WBCs      Neutrophils Relative 83 %      Immat GRANS % 1 %      Lymphocytes Relative 8 %      Monocytes Relative 7 %      Eosinophils Relative 1 %      Basophils Relative 0 %      Neutrophils Absolute 14.09 Thousands/µL      Immature Grans Absolute 0.08 Thousand/uL      Lymphocytes Absolute 1.39 Thousands/µL      Monocytes Absolute 1.14 Thousand/µL      Eosinophils Absolute 0.12 Thousand/µL      Basophils Absolute 0.06 Thousands/µL                  IR drainage tube placement   Final Result by Wei Marks MD (08/18 9628)   Impression:   Successful placement of a 10 Welsh catheter into a right breast abscess under ultrasound control, and 160 mL of tan purulent fluid was aspirated and a specimen sent to the lab as described above. PLAN: Tube to ELIESER bulb suction. Flush with 10 cc of normal saline solution q. day. Return in 2 weeks for tube check. Workstation performed: [id number]         MRI brain w wo contrast   Final Result by Belén Goldsmith MD (08/17 2115)   Motion degraded examination. 1. No acute infarction, edema, or pathologic intra-axial enhancement. 2. Mild chronic microangiopathic ischemic changes. Workstation performed: [id number]         MRA carotids wo and w contrast   Final Result by Belén Goldsmith MD (08/17 2121)      There is no evidence for a hemodynamically significant extracranial carotid stenosis. The cervical vertebral arteries are patent. Workstation performed: [id number]         MRA head wo contrast   Final Result by Belén Goldsmith MD (08/17 2118)      Cerebral MRA demonstrates no large vessel intracranial occlusion or focal stenosis. Workstation performed: [id number]         CT stroke alert brain   Final Result by Aiyana June DO (08/17 3314)      No acute intracranial abnormality. Chronic microangiopathic changes. Findings were directly discussed with Epi Caballero  at 8:37 a.m. on 8/17/2023.       Workstation performed: [id number]         CT chest abdomen pelvis wo contrast   Final Result by Daniel Burrell MD (65/83 9965)      CT chest:      New trace bilateral pleural effusions and small pericardial effusion. Findings may be sequela of sepsis, third spacing, or pulmonary vascular congestion congestion. Trace bilateral lower lobe dependent subsegmental atelectasis slightly increased likely due to new pleural effusions. No airspace disease to suggest pneumonia. Incidental partially visualized right breast loculated fluid collection measuring at least 11 cm. Given history of right breast surgery, this may represent chronic seroma or hematoma. Additional chronic findings and negatives as above. CT abdomen and pelvis:      No evidence of acute abdominopelvic process. Colonic diverticulosis. Additional chronic findings and negatives as above. This study demonstrates a significant  finding and was documented as such in [facility] for liaison and referring practitioner notification. Workstation performed: [id number]         XR chest portable   Final Result by Elizabeth Brandt MD (08/17 1515)      Trace bilateral pleural effusions with subjacent atelectasis. Workstation performed: [id number]         US kidney and bladder   Final Result by Radu Contreras DO (08/15 1617)      Unremarkable kidneys without evidence of hydronephrosis. Workstation performed: [id number]         CT chest wo contrast   Final Result by Shelbi Butt MD (08/14 1151)      No definite pneumonia. Mild septal thickening suggestive of interstitial edema. Dependent atelectasis in the lower lobes. Several subcentimeter nodules, stable since March 2023, likely stable since March 2022 although that exam is compromised by motion.          Workstation performed: [id number]         XR chest portable   Final Result by Rebecca Guo MD (08/11 1402)      Right base infiltrate and/or atelectasis                  Workstation performed: [id number]         IR drainage tube check/change/reposition/reinsertion/upsize    (Results Pending)   IR drainage tube check/change/reposition/reinsertion/upsize    (Results Pending)              Procedures  Procedures  Conscious Sedation Assessment    Flowsheet Row Classification Score   ASA Scale Assessment 2-Mild to moderate systemic disease, medically well controlled, with no functional limitation filed at 08/18/2023 1610   Mallampati Classification Class III: soft palate, base of uvula visible - Moderate Difficulty filed at 08/18/2023 1610             ED Course  ED Course as of 08/21/23 1215   Fri Aug 11, 2023   1240 Temperature(!): 103.1 °F (39.5 °C)  Tylenol ordered   1249 WBC(!): 16.88  Elevated. Meeting SIRS. Will give abx once urine collected. 1250 CBC and differential(!)  Reviewed and without actionable derangement. 1303 PTT: 31  WNL   1303 POCT INR(!): 1.28  Likely 2/2 Eliquis use. 1304 Pt encouraged to provide urine. 1310 Creatinine(!): 1.64  Elevated but largely unchanged from prior. History of CKD. 1311 Comprehensive metabolic panel(!)  Reviewed and without actionable derangement. 1311 Decrease in SpO2. Now on 2L NC.    1314 hs TnI 0hr: 7  Will require delta. 1315 BNP(!): 148  Mildly elevated. 1317 Procalcitonin: 0.08  WNL   1327 LACTIC ACID: 1.2  WNL   1327 XR in room. 1332 FLU/RSV/COVID - if FLU/RSV clinically relevant  Negative. 1350 Leukocytes, UA(!): Small   1350 Nitrite, UA: Negative   1350 Blood, UA(!): Small   1350 WBC, UA(!): 4-10   1350 Bacteria, UA: None Seen  Not convincing for UTI. Will confirm sent for culture. 1357 Pt HR now 125. Will repeat EKG as unable to determine if afib on monitor. 1402 HR now back to 102 and clearly sinus. Pt's only complaint is fatigue. 36 Pt and niece both made aware of all results and plan for admission. Both agreeable. 1407 XR chest portable  Right base infiltrate and/or atelectasis   1407 [facility] contacted for admission. 1446 Blood Pressure(!): 82/56  Will order additional fluids.  Will hold on full 30cc/kg fluids due to history of HF and pt already hypoxic.    1506 BP improved following fluids. Will make [facility] aware. Initial Sepsis Screening     Row Name 08/16/23 1305                Is the patient's history suggestive of a new or worsening infection? Yes (Proceed)  -JS        Suspected source of infection pneumonia  -JS        Indicate SIRS criteria Hyperthemia > 38.3C (100.9F) OR Hypothermia <36C (96.8F); Tachycardia > 90 bpm;Tachypnea > 20 resp per min  -JS        Are two or more of the above signs & symptoms of infection both present and new to the patient? Yes (Proceed)  -JS        Assess for evidence of organ dysfunction: Are any of the below criteria present within 6 hours of suspected infection and SIRS criteria that are NOT considered to be chronic conditions? Creatinine > 2. 0;Creatinine > 2.0 AND > 0.5 above baseline  -JS        Date of presentation of severe sepsis 08/16/23  -JS        Time of presentation of severe sepsis 1305  -JS        Sepsis Note: Click "NEXT" below (NOT "close") to generate sepsis note based on above information. YES (proceed by clicking "NEXT")  -[address]  (r) = Recorded By, (t) = Taken By, (c) = Cosigned By    [address] [place] [address] Name Provider Type    RK Crenshaw Nurse Practitioner              Default Flowsheet Data (last 720 hours)     Sepsis Reassess     Row Name 08/16/23 1700                   Repeat Volume Status and Tissue Perfusion Assessment Performed    Date of Reassessment: 08/16/23  -Casie Abt        Time of Reassessment: 65  -JS        Sepsis Reassessment Note: Click "NEXT" below (NOT "close") to generate sepsis reassessment note.  YES (proceed by clicking "NEXT")  -        Repeat Volume Status and Tissue Perfusion Assessment Performed --              User Key  (r) = Recorded By, (t) = Taken By, (c) = Cosigned By    [address] [place] [address] Name Provider Type    Vimal Tracy, [address] [place] [address] Nurse Practitioner                            Medical Decision Making  Pt is a 79yo F who presents with A-fib. Exam pertinent for fever, tachycardia, and mild confusion. Differential diagnosis to include but not limited to sepsis, anemia, UTI, arrhythmia. Will obtain sepsis work-up and treat with antipyretics. See ED course for results and details. Plan to [facility]. Pt discussed with admitting team and admission orders placed. Pt admitted without incident. I spent 41min of critical care time on this patient. Amount and/or Complexity of Data Reviewed  Labs: ordered. Decision-making details documented in ED Course. Radiology: ordered. Decision-making details documented in ED Course. Risk  OTC drugs. Prescription drug management. Decision regarding hospitalization.           Disposition  Final diagnoses:   Pneumonia   Sepsis ([address])   Hypoxemia   Fever   Fatigue     Time reflects when diagnosis was documented in both MDM as applicable and the Disposition within this note     Time User Action Codes Description Comment    8/11/2023  2:18 PM Graciela Haley Add [J18.9] Pneumonia     8/11/2023  2:18 PM Graciela McMullen Add [A41.9] Sepsis ([address])     8/11/2023  2:18 PM Graciela McMullen Add [R09.02] Hypoxemia     8/11/2023  2:19 PM Carloyn Gavino [R50.9] Fever     8/11/2023  2:19 PM Graciela Haley Add [R53.83] Fatigue     8/11/2023  4:09 PM Chino Garcia Add [D05.11] Ductal carcinoma in situ (DCIS) of right breast     8/15/2023  8:05 AM Donnis Shutters Add [N17.9,  N18.9] Acute kidney injury superimposed on chronic kidney disease ([address])     8/17/2023  8:12 AM Eladia Siu Add [I63.9] Stroke (cerebrum) ([address])     8/17/2023  8:46 AM SalbadorEladia berkowitz Ranjit Add [I63.9] Stroke ([address])     8/18/2023  2:34 PM Tylene Noy Modify [J18.9] Pneumonia     8/18/2023  2:34 PM Tylene Noy Modify [A41.9] Sepsis ([address])     8/18/2023  2:34 PM Tylene Noy Modify [R09.02] Hypoxemia     8/18/2023  2:34 PM Tylene Noy Modify [R50.9] Fever     8/18/2023  2:34 PM Michelle Gutierrez [J15.21] Fatigue     8/18/2023  2:34 PM Kenny Grimes Modify [D05.11] Ductal carcinoma in situ (DCIS) of right breast     8/18/2023  2:34 PM Kenny Son Modify [N17.9,  N18.9] Acute kidney injury superimposed on chronic kidney disease ([address])     8/18/2023  2:34 PM Kenny Son Modify [I63.9] Stroke (cerebrum) ([address])     8/18/2023  2:34 PM Kenny Grimes Modify [I63.9] Stroke [facility])       ED Disposition     ED Disposition   Admit    Condition   Stable    Date/Time   Fri Aug 11, 2023  2:18 PM    Comment   Case was discussed with [facility] and the patient's admission status was agreed to be Admission Status: inpatient status to the service of Dr. Jigar Alexander. Follow-up Information    None         Current Discharge Medication List      START taking these medications    Details   sodium chloride, PF, 0.9 % 10 mL by Intracatheter route daily Intracatheter flushing daily.  May substitute prefilled syringe with normal saline 10 mL vials, 10 mL syringes, and 18 g blunt needles  Qty: 300 mL, Refills: 2    Associated Diagnoses: Sepsis ([address])         CONTINUE these medications which have NOT CHANGED    Details   albuterol (PROVENTIL HFA,VENTOLIN HFA) 90 mcg/act inhaler Inhale 2 puffs every 6 (six) hours as needed for wheezing      ammonium lactate (LAC-HYDRIN) 12 % lotion APPLY TOPICALLY TO AFFECTED AREAS twice a day      apixaban (ELIQUIS) 5 mg Take 1 tablet (5 mg total) by mouth 2 (two) times a day  Qty: 60 tablet, Refills: 2    Associated Diagnoses: Paroxysmal atrial fibrillation (HCC)      B Complex-C-Folic Acid (triphrocaps) 1 MG CAPS Take 1 capsule (1 mg total) by mouth daily  Qty: 30 capsule, Refills: 5    Associated Diagnoses: Anemia, unspecified type      fluticasone (FLONASE) 50 mcg/act nasal spray 1 spray into each nostril daily  Refills: 0    Associated Diagnoses: COVID-19      insulin lispro (HumaLOG KwikPen) 100 units/mL injection pen Use 10 units prior to each meal +1 unit per 50 above 150 mg/dL  Qty: 15 mL, Refills: 2    Associated Diagnoses: Type 2 diabetes mellitus with hyperosmolarity without coma, without long-term current use of insulin ([facility])      metoprolol succinate (TOPROL-XL) 50 mg 24 hr tablet take 1 tablet by mouth twice a day  Qty: 60 tablet, Refills: 1    Associated Diagnoses: Paroxysmal atrial fibrillation ([facility])      nystatin (MYCOSTATIN) powder Apply topically 2 (two) times a day  Qty: 15 g, Refills: 0    Associated Diagnoses: Rash      pregabalin (LYRICA) 100 mg capsule take 1 capsule by mouth twice a day  Qty: 180 capsule, Refills: 1    Associated Diagnoses: Neuropathy      rosuvastatin (CRESTOR) 10 MG tablet take 1 tablet by mouth once daily  Qty: 90 tablet, Refills: 1    Associated Diagnoses: Type 2 diabetes mellitus with hyperosmolarity without coma, without long-term current use of insulin ([address]); Mixed hyperlipidemia      torsemide (DEMADEX) 20 mg tablet TAKE 2 TABLETS BY MOUTH IN THE A.M.  [place] Katie: 180 tablet, Refills: 1    Associated Diagnoses: Chronic heart failure with preserved ejection fraction ([facility])      Toujeo SoloStar 300 units/mL CONCENTRATED U-300 injection pen (1-unit dial) inject 25 units subcutaneously daily at bedtime  Qty: 4.5 mL, Refills: 2    Associated Diagnoses: Type 2 diabetes mellitus with hyperglycemia, without long-term current use of insulin ([facility])      Fluticasone-Salmeterol (Wixela Inhub) 250-50 mcg/dose inhaler Inhale 1 puff 2 (two) times a day Rinse mouth after use. Qty: 60 blister, Refills: 3    Comments: Substitution to a formulary equivalent within the same pharmaceutical class is authorized. Associated Diagnoses: Moderate persistent asthma with acute exacerbation      glucose blood (OneTouch Verio) test strip Use 1 each 4 (four) times a day Use as instructed  Qty: 400 strip, Refills: 1    Associated Diagnoses: Type 2 diabetes mellitus with hyperosmolarity without coma, with long-term current use of insulin ([address]);  Uncontrolled type 2 diabetes mellitus with hyperglycemia ([address])      ! ! Insulin Pen Needle (BD Pen Needle Pilar 2nd Gen) 32G X 4 MM MISC For use with insulin pen. Pharmacy may dispense brand covered by insurance. Qty: 100 each, Refills: 0    Associated Diagnoses: Type 2 diabetes mellitus with hyperosmolarity without coma, without long-term current use of insulin ([address])      ! ! Insulin Pen Needle (NovoFine Autocover) 30G X 8 MM MISC Inject under the skin daily  Qty: 100 each, Refills: 3    Associated Diagnoses: Type 2 diabetes mellitus with hyperosmolarity without coma, without long-term current use of insulin ([address])      ! ! Insulin Pen Needle 30G X 8 MM MISC 2 (two) times a day      Lancets (onetouch ultrasoft) lancets Use once daily  Qty: 100 each, Refills: 2    Comments: New FAX number [phone]  Associated Diagnoses: Type 2 diabetes mellitus with hyperosmolarity without coma, without long-term current use of insulin (HCC)      montelukast (SINGULAIR) 10 mg tablet take 1 tablet by mouth at bedtime  Qty: 30 tablet, Refills: 1    Associated Diagnoses: Moderate persistent asthma with acute exacerbation      !! NovoFine Autocover Pen Needle 30G X 8 MM MISC USE TWICE A DAY  Qty: 300 each, Refills: 5    Associated Diagnoses: Type 2 diabetes mellitus with hyperglycemia, without long-term current use of insulin (HCC)      pantoprazole (PROTONIX) 40 mg tablet Take 1 tablet (40 mg total) by mouth daily  Qty: 30 tablet, Refills: 0    Associated Diagnoses: Acute gout due to renal impairment involving toe of left foot      semaglutide, 1 mg/dose, (Ozempic, 1 MG/DOSE,) 4 mg/3 mL injection pen Inject 0.75 mL (1 mg total) under the skin every 7 days  Qty: 9 mL, Refills: 1    Associated Diagnoses: Type 2 diabetes mellitus with hyperglycemia, without long-term current use of insulin ([address])       ! ! - Potential duplicate medications found. Please discuss with provider.           Outpatient Discharge Orders   IR drainage tube check/change/reposition/reinsertion/upsize   Standing Status: Future Number of Occurrences: 1 Standing Exp. Date: 08/18/27     IR drainage tube check/change/reposition/reinsertion/upsize   Standing Status: Future Standing Exp.  Date: 08/18/27       PDMP Review       Value Time User    PDMP Reviewed  Yes 4/6/2023 11:35 AM Avril Cronin MD          ED Provider  Electronically Signed by           Manuel Corey MD  08/21/23 2977

## 2023-08-11 NOTE — ASSESSMENT & PLAN NOTE
Patient had breast mass removed recently and is in the process of seeing radiation oncology  Outpatient follow-up

## 2023-08-11 NOTE — PLAN OF CARE
Problem: RESPIRATORY - ADULT  Goal: Achieves optimal ventilation and oxygenation  Description: INTERVENTIONS:  - Assess for changes in respiratory status  - Assess for changes in mentation and behavior  - Position to facilitate oxygenation and minimize respiratory effort  - Oxygen administered by appropriate delivery if ordered  - Initiate smoking cessation education as indicated  - Encourage broncho-pulmonary hygiene including cough, deep breathe, Incentive Spirometry  - Assess the need for suctioning and aspirate as needed  - Assess and instruct to report SOB or any respiratory difficulty  - Respiratory Therapy support as indicated  Outcome: Progressing     Problem: CARDIOVASCULAR - ADULT  Goal: Maintains optimal cardiac output and hemodynamic stability  Description: INTERVENTIONS:  - Monitor I/O, vital signs and rhythm  - Monitor for S/S and trends of decreased cardiac output  - Administer and titrate ordered vasoactive medications to optimize hemodynamic stability  - Assess quality of pulses, skin color and temperature  - Assess for signs of decreased coronary artery perfusion  - Instruct patient to report change in severity of symptoms  Outcome: Progressing  Goal: Absence of cardiac dysrhythmias or at baseline rhythm  Description: INTERVENTIONS:  - Continuous cardiac monitoring, vital signs, obtain 12 lead EKG if ordered  - Administer antiarrhythmic and heart rate control medications as ordered  - Monitor electrolytes and administer replacement therapy as ordered  Outcome: Progressing     Problem: METABOLIC, FLUID AND ELECTROLYTES - ADULT  Goal: Electrolytes maintained within normal limits  Description: INTERVENTIONS:  - Monitor labs and assess patient for signs and symptoms of electrolyte imbalances  - Administer electrolyte replacement as ordered  - Monitor response to electrolyte replacements, including repeat lab results as appropriate  - Instruct patient on fluid and nutrition as appropriate  Outcome: Progressing  Goal: Fluid balance maintained  Description: INTERVENTIONS:  - Monitor labs   - Monitor I/O and WT  - Instruct patient on fluid and nutrition as appropriate  - Assess for signs & symptoms of volume excess or deficit  Outcome: Progressing  Goal: Glucose maintained within target range  Description: INTERVENTIONS:  - Monitor Blood Glucose as ordered  - Assess for signs and symptoms of hyperglycemia and hypoglycemia  - Administer ordered medications to maintain glucose within target range  - Assess nutritional intake and initiate nutrition service referral as needed  Outcome: Progressing     Problem: SKIN/TISSUE INTEGRITY - ADULT  Goal: Skin Integrity remains intact(Skin Breakdown Prevention)  Description: Assess:  -Perform [place] assessment every shift  Problem: INFECTION - ADULT  Goal: Absence or prevention of progression during hospitalization  Description: INTERVENTIONS:  - Assess and monitor for signs and symptoms of infection  - Monitor lab/diagnostic results  - Monitor all insertion sites, i.e. indwelling lines, tubes, and drains  - Monitor endotracheal if appropriate and nasal secretions for changes in amount and color  - [place] appropriate cooling/warming therapies per order  - Administer medications as ordered  - Instruct and encourage patient and family to use good hand hygiene technique  - Identify and instruct in appropriate isolation precautions for identified infection/condition  Outcome: Progressing  Goal: Absence of fever/infection during neutropenic period  Description: INTERVENTIONS:  - Monitor WBC    Outcome: Progressing     Problem: SAFETY ADULT  Goal: Patient will remain free of falls  Description: INTERVENTIONS:  - Educate patient/family on patient safety including physical limitations  - Instruct patient to call for assistance with activity   - Consult OT/PT to assist with strengthening/mobility   - Keep Call bell within reach  - Keep bed low and locked with side rails adjusted as appropriate  - Keep care items and personal belongings within reach  - Initiate and maintain comfort rounds  - Make Fall Risk Sign visible to staff  - Offer Toileting every 2 Hours, in advance of need  - Initiate/Maintainbedalarm  - Apply yellow socks and bracelet for high fall risk patients  - Consider moving patient to room near nurses station  Outcome: Progressing  Goal: Maintain or return to baseline ADL function  Description: INTERVENTIONS:  -  Assess patient's ability to carry out ADLs; assess patient's baseline for ADL function and identify physical deficits which impact ability to perform ADLs (bathing, care of mouth/teeth, toileting, grooming, dressing, etc.)  - Assess/evaluate cause of self-care deficits   - Assess range of motion  - Assess patient's mobility; develop plan if impaired  - Assess patient's need for assistive devices and provide as appropriate  - Encourage maximum independence but intervene and supervise when necessary  - Involve family in performance of ADLs  - Assess for home care needs following discharge   - Consider OT consult to assist with ADL evaluation and planning for discharge  - Provide patient education as appropriate  Outcome: Progressing  Goal: Maintains/Returns to pre admission functional level  Description: INTERVENTIONS:  - Perform BMAT or MOVE assessment daily.   - Set and communicate daily mobility goal to care team and patient/family/caregiver.    - Collaborate with rehabilitation services on mobility goals if consulted  - Out of bed to chair 3  times a day   - Out of bed for meals 3 times a day  - Out of bed for toileting  - Record patient progress and toleration of activity level   Outcome: Progressing     Problem: DISCHARGE PLANNING  Goal: Discharge to home or other facility with appropriate resources  Description: INTERVENTIONS:  - Identify barriers to discharge w/patient and caregiver  - Arrange for needed discharge resources and transportation as appropriate  - Identify discharge learning needs (meds, wound care, etc.)  - Arrange for interpretive services to assist at discharge as needed  - Refer to Case Management Department for coordinating discharge planning if the patient needs post-hospital services based on physician/advanced practitioner order or complex needs related to functional status, cognitive ability, or social support system  Outcome: Progressing     Problem: Knowledge Deficit  Goal: Patient/family/caregiver demonstrates understanding of disease process, treatment plan, medications, and discharge instructions  Description: Complete learning assessment and assess knowledge base.   Interventions:  - Provide teaching at level of understanding  - Provide teaching via preferred learning methods  Outcome: Progressing     -Clean and moisturize skin every shift  -Inspect skin when repositioning, toileting, and assisting with ADLS  -Assess extremities for adequate circulation and sensation     Bed Management:  -Have minimal linens on bed & keep smooth, unwrinkled  -Change linens as needed when moist or perspiring    Toileting:  -Offer bedside commode      Activity:  -Mobilize patient 4 times a day  -Encourage activity and walks on unit  -Encourage or provide ROM exercises   -Turn and reposition patient every 2 Hours    Skin Care:  -Avoid use of baby powder, tape, friction and shearing, hot water or constrictive clothing  -Relieve pressure over bony prominences using [place]  -Do not massage red bony areas    Next Steps:  Outcome: Progressing  Goal: Incision(s), wounds(s) or drain site(s) healing without S/S of infection  Description: INTERVENTIONS  - Assess and document dressing, incision, wound bed, drain sites and surrounding tissue  - Provide patient and family education    Outcome: Progressing  Goal: Pressure injury heals and does not worsen  Description: Interventions:  - Implement low air loss mattress or specialty surface (Criteria met)  - Apply silicone foam dressing  - Turn and reposition patient & offload bony prominences every 2 hours   - Utilize friction reducing device or surface for transfers   - Consider consults to  interdisciplinary teams such as wound care consult  - Use incontinent care products after each incontinent episode such as foam   - Consider nutrition services referral as needed  Outcome: Progressing

## 2023-08-11 NOTE — H&P
[address]  H&P  Name: Elinor Lou 72 y.o. female I MRN: [id number]  Unit/Bed#: ROBB I Date of Admission: 8/11/2023   Date of Service: 8/11/2023 I Hospital Day: 0      Assessment/Plan   * Sepsis due to pneumonia [facility])  Assessment & Plan  Patient reports feeling unwell for 2 days with intermittent shortness of breath. This morning when she woke up her heart was racing prompting her to come to the emergency department. On arrival to the ER she was found to have fever of 103. She meets sepsis criteria with fever, tachycardia, leukocytosis. She had a chest x-ray which revealed a right base infiltrate. Flu and COVID-negative  · Admit to medicine  · Continue Rocephin, add azithromycin  · Given BMI of 49.9, will give IV fluid based on ideal body weight, will need to receive an additional liter of IV fluids which has been ordered  · Lactic normal  · Procalcitonin 0.08, repeat in a.m. · Blood and urine cultures pending  · Send urine for strep and legionella  · Supportive therapies with duonebs, respiratory protocol      Ductal carcinoma in situ (DCIS) of right breast  Assessment & Plan  Patient had breast mass removed recently and is in the process of seeing radiation oncology  Outpatient follow-up    Type 2 diabetes mellitus with hyperglycemia, without long-term current use of insulin [facility])  Assessment & Plan  Lab Results   Component Value Date    HGBA1C 8.1 (H) 03/25/2023       Recent Labs     08/11/23  1459   POCGLU 158*       Blood Sugar Average: Last 72 hrs:  (P) 158   Continue 25 units of long-acting insulin at bedtime, 10 units of Humalog 3 times daily with meals and insulin sliding scale  Check hemoglobin A1c in a.m.     Chronic respiratory failure with hypoxia and hypercapnia (HCC)  Assessment & Plan  Patient wears oxygen at home at night with her CPAP but is not oxygen dependent during the day    KATRINA (obstructive sleep apnea)  Assessment & Plan  Continue CPAP    Stage 3 chronic kidney disease [facility])  Assessment & Plan  Lab Results   Component Value Date    EGFR 31 08/11/2023    EGFR 27 04/30/2023    EGFR 30 04/29/2023    CREATININE 1.64 (H) 08/11/2023    CREATININE 1.82 (H) 04/30/2023    CREATININE 1.67 (H) 04/29/2023   Creatinine at baseline    Essential hypertension  Assessment & Plan  Hold metoprolol and Demadex    Paroxysmal atrial fibrillation (HCC)  Assessment & Plan  Continue Eliquis  Hold metoprolol given hypotension, resume as soon as possible    Morbid obesity with BMI of 45.0-49.9, adult (HCC)  Assessment & Plan  Dietary lifestyle modifications         VTE Prophylaxis: Apixaban (Eliquis)  / sequential compression device   Code Status: Prior    Anticipated Length of Stay:  Patient will be admitted on an Inpatient basis with an anticipated length of stay of  Greater than 2 midnights. Justification for Hospital Stay: sepsis due to pneumonia    Total Time for Visit, including Counseling / Coordination of Care: 20 minutes. Greater than 50% of this total time spent on direct patient counseling and coordination of care. Chief Complaint:   Fever (Pt arrives with her niece who states this AM she was in afib and acting confused and having chills. )      History of Present Illness:    Elinor Lou is a 72 y.o. female with a PMH of atrial fibrillation, diabetes, chronic kidney disease, newly diagnosed breast cancer, obstructive sleep apnea who presents with altered mental status. Patient was brought in by her niece because she was acting confused this morning. Patient states she has been feeling short of breath for 2 days. This morning she woke up and she was having palpitations. She has history of paroxysmal atrial fibrillation and states that she felt similar to these episodes that she had in the past.  On arrival to the emergency department patient was found to have a fever of 103. She met sepsis criteria with fever, tachycardia, tachypnea and leukocytosis.   Chest x-ray revealed a right lower lobe infiltrate. Patient had an episode of hypotension in the emergency department and received a sepsis bolus based on her ideal body weight. She is admitted for further management. Review of Systems:    Review of Systems   Constitutional: Positive for chills and fever. HENT: Negative for ear pain and sore throat. Eyes: Negative for pain and visual disturbance. Respiratory: Positive for shortness of breath. Negative for cough. Cardiovascular: Negative for chest pain and palpitations. Gastrointestinal: Negative for abdominal pain and vomiting. Genitourinary: Negative for dysuria and hematuria. Musculoskeletal: Negative for arthralgias and back pain. Skin: Negative for color change and rash. Neurological: Negative for seizures and syncope. All other systems reviewed and are negative.       Past Medical and Surgical History:     Past Medical History:   Diagnosis Date   • SEBASTIAN (acute kidney injury) ([address]) 01/23/2023   • Anemia    • Asthma    • Atrial fibrillation (HCC)    • Chronic kidney disease    • COVID-19 January 2022   • Diabetes mellitus ([address])    • GERD (gastroesophageal reflux disease)    • Hyperlipidemia    • Hypertension    • PONV (postoperative nausea and vomiting)    • Sleep apnea     wear BIPAP   • SVT (supraventricular tachycardia) ([address])        Past Surgical History:   Procedure Laterality Date   • APPENDECTOMY     • BREAST BIOPSY Right     years ago when she was 23   • BREAST BIOPSY Right 03/13/2023   • BREAST LUMPECTOMY Right 5/30/2023    Procedure: RIGHT BREAST KRIS  DIRECTED LUMPECTOMY - Jyoti Aragon;  Surgeon: Adrien Travis MD;  Location: [facility];  Service: Surgical Oncology   • CYSTOSCOPY W/ LASER LITHOTRIPSY Left 07/12/2016    Procedure: CYSTOSCOPY URETEROSCOPY WITH LITHOTRIPSY HOLMIUM LASER, RETROGRADE PYELOGRAM AND INSERTION STENT URETERAL;  Surgeon: Stephanie Gallo MD;  Location: [facility];  Service:    • DILATION AND CURETTAGE OF UTERUS     • IR THORACENTESIS  03/18/2022   • JOINT REPLACEMENT      right knee   • KNEE ARTHROPLASTY Right    • MAMMO NEEDLE LOCALIZATION LEFT (ALL INC) EACH ADD Right 4/24/2023   • MAMMO STEREOTACTIC BREAST BIOPSY RIGHT (ALL INC) Right 03/13/2023    High Risk Lesion   • MS ARTHROPLASTY GLENOHUMERAL JOINT TOTAL SHOULDER Left 03/01/2022    Procedure: ARTHROPLASTY SHOULDER REVERSE;  Surgeon: Janice Salguero MD;  Location: [facility];  Service: Orthopedics   • MS CYSTO BLADDER W/URETERAL CATHETERIZATION Left 06/29/2016    Procedure: CYSTOSCOPY RETROGRADE PYELOGRAM WITH INSERTION STENT URETERAL, left;  Surgeon: Joel Moon MD;  Location: [facility];  Service: Urology   • SHOULDER ARTHROTOMY Left        Meds/Allergies:    Prior to Admission medications    Medication Sig Start Date End Date Taking? Authorizing Provider   albuterol (PROVENTIL HFA,VENTOLIN HFA) 90 mcg/act inhaler Inhale 2 puffs every 6 (six) hours as needed for wheezing    Historical Provider, MD   ammonium lactate (LAC-HYDRIN) 12 % lotion APPLY TOPICALLY TO AFFECTED AREAS twice a day 12/16/21   Historical Provider, MD   apixaban (ELIQUIS) 5 mg Take 1 tablet (5 mg total) by mouth 2 (two) times a day 6/19/23   Yoko Boateng MD   B Complex-C-Folic Acid (triphrocaps) 1 MG CAPS Take 1 capsule (1 mg total) by mouth daily 6/26/23   Yoko Boateng MD   fluticasone [facility]) 50 mcg/act nasal spray 1 spray into each nostril daily 11/3/22   RK Rayo   Fluticasone-Salmeterol (Wixela Inhub) 250-50 mcg/dose inhaler Inhale 1 puff 2 (two) times a day Rinse mouth after use.  4/4/23 8/2/23  RK Hi   glucose blood (OneTouch Verio) test strip Use 1 each 4 (four) times a day Use as instructed 3/29/23   Yoko Boateng MD   insulin lispro (HumaLOG KwikPen) 100 units/mL injection pen Use 10 units prior to each meal +1 unit per 50 above 150 mg/dL 2/27/23   Deepa Quintero MD   Insulin Pen Needle (BD Pen Needle Pilar 2nd Gen) 32G X 4 MM MISC For use with insulin pen. Pharmacy may dispense brand covered by insurance. 5/23/22   Judi Hirsch MD   Insulin Pen Needle (NovoFine Autocover) 30G X 8 MM MISC Inject under the skin daily 11/8/22   Batool Daniel MD   Insulin Pen Needle 30G X 8 MM MISC 2 (two) times a day    Historical Provider, MD   Insulin Pen Needle 31G X 8 MM MISC Use daily Inject under the skin 12/8/22 8/2/23  Batool EstimMD levi   Lancets (onetouch ultrasoft) lancets Use once daily 4/8/22   Batool Estimable, MD   metoprolol succinate (TOPROL-XL) 50 mg 24 hr tablet take 1 tablet by mouth twice a day 7/24/23   Batool Estimable, MD   montelukast (SINGULAIR) 10 mg tablet take 1 tablet by mouth at bedtime 7/24/23   Batool EstimMD levi   NovoFine Autocover Pen Needle 30G X 8 MM MISC USE TWICE A DAY 2/23/23   Batool EstimMD levi   nystatin (MYCOSTATIN) powder Apply topically 2 (two) times a day 3/28/23   RK López   pantoprazole (PROTONIX) 40 mg tablet Take 1 tablet (40 mg total) by mouth daily 7/26/23   Edmund Kumar MD   pregabalin (LYRICA) 100 mg capsule take 1 capsule by mouth twice a day 4/24/23   Batool EstimMD levi   rosuvastatin (CRESTOR) 10 MG tablet take 1 tablet by mouth once daily 7/13/23   Batool Daniel MD   semaglutide, 1 mg/dose, (Ozempic, 1 MG/DOSE,) 4 mg/3 mL injection pen Inject 0.75 mL (1 mg total) under the skin every 7 days 3/24/23   Stan Lynne MD   torsemide (DEMADEX) 20 mg tablet TAKE 2 TABLETS BY MOUTH IN THE A.M. 5/23/23   MD Lev ShepherdoStar 300 units/mL CONCENTRATED U-300 injection pen (1-unit dial) inject 25 units subcutaneously daily at bedtime 7/21/23   Batool Daniel MD       Allergies:    Allergies   Allergen Reactions   • Penicillins Hives   • Moxifloxacin Other (See Comments)     unknown   • Oxycodone-Acetaminophen Other (See Comments)     GI upset   • Zinc Acetate Other (See Comments)     unknown   • Penicillins Hives   • Asa [Aspirin] GI Intolerance   • Indocin [Indomethacin] Other (See Comments)     Made patient "loopy"   • Other Other (See Comments)     unknown   • Tannic Acid Rash       Social History:     Marital Status: Single   Substance Use History:   Social History     Substance and Sexual Activity   Alcohol Use Not Currently    Comment: rarely     Social History     Tobacco Use   Smoking Status Former   • Packs/day: 1.00   • Years: 20.00   • Total pack years: 20.00   • Types: Cigarettes   • Quit date: 1996   • Years since quittin.1   • Passive exposure: Past   Smokeless Tobacco Never     Social History     Substance and Sexual Activity   Drug Use Never       Family History:    Family History   Problem Relation Age of Onset   • Heart disease Mother    • Diabetes Father    • Thyroid cancer Sister    • Heart disease Brother    • Diabetes Brother    • Heart disease Brother    • Heart disease Brother    • Emphysema Maternal Grandmother    • Heart disease Family        Physical Exam:     Vitals:   Blood Pressure: 117/57 (23 1552)  Pulse: 83 (23 1552)  Temperature: 98.9 °F (37.2 °C) (23 1500)  Temp Source: Oral (23 1500)  Respirations: 18 (23 1524)  Weight - Scale: 124 kg (273 lb 2.4 oz) (23 1222)  SpO2: 99 % (23 1545)    Physical Exam  Vitals and nursing note reviewed. Constitutional:       Appearance: She is morbidly obese. Interventions: Nasal cannula in place. HENT:      Head: Normocephalic. Nose: Nose normal.   Eyes:      Extraocular Movements: Extraocular movements intact. Cardiovascular:      Rate and Rhythm: Normal rate and regular rhythm. Pulses: Normal pulses. Pulmonary:      Effort: Pulmonary effort is normal. No respiratory distress. Breath sounds: Normal breath sounds. Abdominal:      General: Abdomen is flat. Bowel sounds are normal.      Palpations: Abdomen is soft. Musculoskeletal:         General: Swelling present. Deformity: trace pedal edema. Normal range of motion. Skin:     General: Skin is warm and dry. Neurological:      General: No focal deficit present. Mental Status: She is alert and oriented to person, place, and time. Psychiatric:         Mood and Affect: Mood normal.         Behavior: Behavior normal.           Additional Data:     Lab Results: I have personally reviewed pertinent reports. Results from last 7 days   Lab Units 08/11/23  1236   WBC Thousand/uL 16.88*   HEMOGLOBIN g/dL 11.9   HEMATOCRIT % 37.6   PLATELETS Thousands/uL 171   NEUTROS PCT % 83*     Results from last 7 days   Lab Units 08/11/23  1236   SODIUM mmol/L 137   POTASSIUM mmol/L 4.5   CHLORIDE mmol/L 97   CO2 mmol/L 33*   BUN mg/dL 35*   CREATININE mg/dL 1.64*   CALCIUM mg/dL 9.0   TOTAL BILIRUBIN mg/dL 0.56   ALK PHOS U/L 54   ALT U/L 12   AST U/L 15     Results from last 7 days   Lab Units 08/11/23  1236   INR  1.28*         Results from last 7 days   Lab Units 08/11/23  1459   POC GLUCOSE mg/dl 158*     Results from last 7 days   Lab Units 08/11/23  1236   LACTIC ACID mmol/L 1.2       Imaging: I have personally reviewed pertinent reports. XR chest portable   Final Result by Monika Christianson MD (08/11 1402)      Right base infiltrate and/or atelectasis                  Workstation performed: [id number]             XR chest portable   Final Result      Right base infiltrate and/or atelectasis                  Workstation performed: [id number]             EKG, Pathology, and Other Studies Reviewed on Admission:   · EKG: Normal sinus rhythm    Allscripts / Epic Records Reviewed: Yes     ** Please Note: This note has been constructed using a voice recognition system.  **

## 2023-08-12 LAB
ANION GAP SERPL CALCULATED.3IONS-SCNC: 9 MMOL/L
BUN SERPL-MCNC: 36 MG/DL (ref 5–25)
CALCIUM SERPL-MCNC: 8.3 MG/DL (ref 8.4–10.2)
CHLORIDE SERPL-SCNC: 104 MMOL/L (ref 96–108)
CO2 SERPL-SCNC: 24 MMOL/L (ref 21–32)
CREAT SERPL-MCNC: 1.82 MG/DL (ref 0.6–1.3)
ERYTHROCYTE [DISTWIDTH] IN BLOOD BY AUTOMATED COUNT: 15.4 % (ref 11.6–15.1)
GFR SERPL CREATININE-BSD FRML MDRD: 27 ML/MIN/1.73SQ M
GLUCOSE SERPL-MCNC: 111 MG/DL (ref 65–140)
GLUCOSE SERPL-MCNC: 118 MG/DL (ref 65–140)
GLUCOSE SERPL-MCNC: 153 MG/DL (ref 65–140)
GLUCOSE SERPL-MCNC: 164 MG/DL (ref 65–140)
GLUCOSE SERPL-MCNC: 210 MG/DL (ref 65–140)
HCT VFR BLD AUTO: 36 % (ref 34.8–46.1)
HGB BLD-MCNC: 10.9 G/DL (ref 11.5–15.4)
L PNEUMO1 AG UR QL IA.RAPID: NEGATIVE
MCH RBC QN AUTO: 28.3 PG (ref 26.8–34.3)
MCHC RBC AUTO-ENTMCNC: 30.3 G/DL (ref 31.4–37.4)
MCV RBC AUTO: 94 FL (ref 82–98)
PLATELET # BLD AUTO: 153 THOUSANDS/UL (ref 149–390)
PMV BLD AUTO: 10.2 FL (ref 8.9–12.7)
POTASSIUM SERPL-SCNC: 4.5 MMOL/L (ref 3.5–5.3)
PROCALCITONIN SERPL-MCNC: 0.23 NG/ML
RBC # BLD AUTO: 3.85 MILLION/UL (ref 3.81–5.12)
S PNEUM AG UR QL: NEGATIVE
SODIUM SERPL-SCNC: 137 MMOL/L (ref 135–147)
WBC # BLD AUTO: 15.48 THOUSAND/UL (ref 4.31–10.16)

## 2023-08-12 PROCEDURE — 87449 NOS EACH ORGANISM AG IA: CPT | Performed by: NURSE PRACTITIONER

## 2023-08-12 PROCEDURE — 99232 SBSQ HOSP IP/OBS MODERATE 35: CPT | Performed by: FAMILY MEDICINE

## 2023-08-12 PROCEDURE — 84145 PROCALCITONIN (PCT): CPT | Performed by: NURSE PRACTITIONER

## 2023-08-12 PROCEDURE — 82948 REAGENT STRIP/BLOOD GLUCOSE: CPT

## 2023-08-12 PROCEDURE — 85027 COMPLETE CBC AUTOMATED: CPT | Performed by: NURSE PRACTITIONER

## 2023-08-12 PROCEDURE — 80048 BASIC METABOLIC PNL TOTAL CA: CPT | Performed by: NURSE PRACTITIONER

## 2023-08-12 RX ORDER — FUROSEMIDE 10 MG/ML
20 INJECTION INTRAMUSCULAR; INTRAVENOUS ONCE
Status: COMPLETED | OUTPATIENT
Start: 2023-08-12 | End: 2023-08-12

## 2023-08-12 RX ORDER — TORSEMIDE 20 MG/1
20 TABLET ORAL DAILY
Status: DISCONTINUED | OUTPATIENT
Start: 2023-08-13 | End: 2023-08-13

## 2023-08-12 RX ORDER — SACCHAROMYCES BOULARDII 250 MG
250 CAPSULE ORAL 2 TIMES DAILY
Status: DISCONTINUED | OUTPATIENT
Start: 2023-08-12 | End: 2023-08-22 | Stop reason: HOSPADM

## 2023-08-12 RX ORDER — METOPROLOL SUCCINATE 50 MG/1
50 TABLET, EXTENDED RELEASE ORAL 2 TIMES DAILY
Status: DISCONTINUED | OUTPATIENT
Start: 2023-08-12 | End: 2023-08-16

## 2023-08-12 RX ADMIN — METOPROLOL SUCCINATE 50 MG: 50 TABLET, EXTENDED RELEASE ORAL at 10:20

## 2023-08-12 RX ADMIN — ATORVASTATIN CALCIUM 20 MG: 20 TABLET, FILM COATED ORAL at 17:05

## 2023-08-12 RX ADMIN — MONTELUKAST 10 MG: 10 TABLET, FILM COATED ORAL at 21:05

## 2023-08-12 RX ADMIN — CEFTRIAXONE 1000 MG: 1 INJECTION, SOLUTION INTRAVENOUS at 13:01

## 2023-08-12 RX ADMIN — INSULIN LISPRO 4 UNITS: 100 INJECTION, SOLUTION INTRAVENOUS; SUBCUTANEOUS at 17:05

## 2023-08-12 RX ADMIN — INSULIN LISPRO 10 UNITS: 100 INJECTION, SOLUTION INTRAVENOUS; SUBCUTANEOUS at 12:57

## 2023-08-12 RX ADMIN — Medication 250 MG: at 10:20

## 2023-08-12 RX ADMIN — PREGABALIN 100 MG: 100 CAPSULE ORAL at 17:05

## 2023-08-12 RX ADMIN — INSULIN LISPRO 2 UNITS: 100 INJECTION, SOLUTION INTRAVENOUS; SUBCUTANEOUS at 12:56

## 2023-08-12 RX ADMIN — INSULIN LISPRO 10 UNITS: 100 INJECTION, SOLUTION INTRAVENOUS; SUBCUTANEOUS at 17:06

## 2023-08-12 RX ADMIN — ACETAMINOPHEN 650 MG: 325 TABLET ORAL at 19:39

## 2023-08-12 RX ADMIN — INSULIN LISPRO 10 UNITS: 100 INJECTION, SOLUTION INTRAVENOUS; SUBCUTANEOUS at 08:02

## 2023-08-12 RX ADMIN — INSULIN GLARGINE 25 UNITS: 100 INJECTION, SOLUTION SUBCUTANEOUS at 21:05

## 2023-08-12 RX ADMIN — APIXABAN 5 MG: 5 TABLET, FILM COATED ORAL at 08:00

## 2023-08-12 RX ADMIN — PANTOPRAZOLE SODIUM 40 MG: 40 TABLET, DELAYED RELEASE ORAL at 08:00

## 2023-08-12 RX ADMIN — AZITHROMYCIN MONOHYDRATE 500 MG: 250 TABLET ORAL at 17:05

## 2023-08-12 RX ADMIN — PREGABALIN 100 MG: 100 CAPSULE ORAL at 08:00

## 2023-08-12 RX ADMIN — FUROSEMIDE 20 MG: 10 INJECTION, SOLUTION INTRAVENOUS at 10:20

## 2023-08-12 RX ADMIN — FLUTICASONE PROPIONATE 1 SPRAY: 50 SPRAY, METERED NASAL at 08:02

## 2023-08-12 RX ADMIN — INSULIN LISPRO 2 UNITS: 100 INJECTION, SOLUTION INTRAVENOUS; SUBCUTANEOUS at 21:05

## 2023-08-12 RX ADMIN — METOPROLOL SUCCINATE 50 MG: 50 TABLET, EXTENDED RELEASE ORAL at 17:05

## 2023-08-12 RX ADMIN — APIXABAN 5 MG: 5 TABLET, FILM COATED ORAL at 17:05

## 2023-08-12 RX ADMIN — Medication 250 MG: at 17:05

## 2023-08-12 RX ADMIN — FLUTICASONE FUROATE AND VILANTEROL TRIFENATATE 1 PUFF: 100; 25 POWDER RESPIRATORY (INHALATION) at 08:01

## 2023-08-12 NOTE — ASSESSMENT & PLAN NOTE
Initially held metoprolol and Demadex  Blood pressure improved, metoprolol resumed. Patient appeared volume overloaded with +4 pitting edema bilateral lower extremities, received one-time dose of Lasix 20 mg IV on 8/12. We will resume patient's home medication Demadex on a.m. of 8/13  Monitor.

## 2023-08-12 NOTE — ASSESSMENT & PLAN NOTE
Patient wears oxygen at home at night with her CPAP but is not oxygen dependent during the day  Keep O2 sats greater than 92%

## 2023-08-12 NOTE — ASSESSMENT & PLAN NOTE
Lab Results   Component Value Date    HGBA1C 8.1 (H) 03/25/2023       Recent Labs     08/11/23  1720 08/11/23 2029 08/12/23  0706 08/12/23  1200   POCGLU 132 241* 111 164*       Blood Sugar Average: Last 72 hrs:  (P) 161.2   Continue 25 units of long-acting insulin at bedtime, 10 units of Humalog 3 times daily with meals and insulin sliding scale  Check hemoglobin A1c in a.m.

## 2023-08-12 NOTE — ASSESSMENT & PLAN NOTE
Patient reports feeling unwell for 2 days with intermittent shortness of breath. Morning of admission, when she woke up her heart was racing prompting her to come to the emergency department. On arrival to the ER she was found to have fever of 103. She meets sepsis criteria with fever, tachycardia, leukocytosis. She had a chest x-ray which revealed a right base infiltrate. Flu and COVID-negative  · Admitted to medicine  · Started on Rocephin, and azithromycin; urine antigens negative, will de-escalate azithromycin   · Given BMI of 49.9, received IV fluid based on ideal body weight, and received an additional liter of IV fluids Lactic normal  · 8/12 AM patient demonstrated bilateral lower extremity edema; BP stable; give one time dose lasix 20mg and resume home medication demadex in AM 8/13  · Procalcitonin 0.08, repeat 0.23.   · Blood and urine cultures pending  · Urine for strep and legionella negative  · Supportive therapies with missy, respiratory protocol

## 2023-08-12 NOTE — ASSESSMENT & PLAN NOTE
Continue Eliquis  Metoprolol initially was held secondary to hypotension.   Blood pressure improved, metoprolol resumed

## 2023-08-12 NOTE — PROGRESS NOTES
[id number] [facility]  Progress Note  Name: Lisbet Hernandez  MRN: [id number]  Unit/Bed#: 4 [address] I Date of Admission: 8/11/2023   Date of Service: 8/12/2023 I Hospital Day: 1    Assessment/Plan   * Sepsis due to pneumonia [facility])  Assessment & Plan  Patient reports feeling unwell for 2 days with intermittent shortness of breath. Morning of admission, when she woke up her heart was racing prompting her to come to the emergency department. On arrival to the ER she was found to have fever of 103. She meets sepsis criteria with fever, tachycardia, leukocytosis. She had a chest x-ray which revealed a right base infiltrate. Flu and COVID-negative  · Admitted to medicine  · Started on Rocephin, and azithromycin; urine antigens negative, will de-escalate azithromycin   · Given BMI of 49.9, received IV fluid based on ideal body weight, and received an additional liter of IV fluids Lactic normal  · 8/12 AM patient demonstrated bilateral lower extremity edema; BP stable; give one time dose lasix 20mg and resume home medication demadex in AM 8/13  · Procalcitonin 0.08, repeat 0.23. · Blood and urine cultures pending  · Urine for strep and legionella negative  · Supportive therapies with duonebs, respiratory protocol      Chronic respiratory failure with hypoxia and hypercapnia ([address])  Assessment & Plan  Patient wears oxygen at home at night with her CPAP but is not oxygen dependent during the day  Keep O2 sats greater than 92%    Paroxysmal atrial fibrillation (HCC)  Assessment & Plan  Continue Eliquis  Metoprolol initially was held secondary to hypotension.   Blood pressure improved, metoprolol resumed    Stage 3 chronic kidney disease [facility])  Assessment & Plan  Lab Results   Component Value Date    EGFR 27 08/12/2023    EGFR 31 08/11/2023    EGFR 27 04/30/2023    CREATININE 1.82 (H) 08/12/2023    CREATININE 1.64 (H) 08/11/2023    CREATININE 1.82 (H) 04/30/2023   Creatinine at baseline  Repeat BMP in a.m.    Essential hypertension  Assessment & Plan  Initially held metoprolol and Demadex  Blood pressure improved, metoprolol resumed. Patient appeared volume overloaded with +4 pitting edema bilateral lower extremities, received one-time dose of Lasix 20 mg IV on 8/12. We will resume patient's home medication Demadex on a.m. of 8/13  Monitor. Ductal carcinoma in situ (DCIS) of right breast  Assessment & Plan  Patient had breast mass removed recently and is in the process of seeing radiation oncology  Outpatient follow-up    Morbid obesity with BMI of 45.0-49.9, adult [facility])  Assessment & Plan  Dietary lifestyle modifications    Type 2 diabetes mellitus with hyperglycemia, without long-term current use of insulin [facility])  Assessment & Plan  Lab Results   Component Value Date    HGBA1C 8.1 (H) 03/25/2023       Recent Labs     08/11/23  1720 08/11/23  2029 08/12/23  0706 08/12/23  1200   POCGLU 132 241* 111 164*       Blood Sugar Average: Last 72 hrs:  (P) 161.2   Continue 25 units of long-acting insulin at bedtime, 10 units of Humalog 3 times daily with meals and insulin sliding scale  Check hemoglobin A1c in a.m.    KATRINA (obstructive sleep apnea)  Assessment & Plan  Continue CPAP               VTE Pharmacologic Prophylaxis: VTE Score: 5 High Risk (Score >/= 5) - Pharmacological DVT Prophylaxis Ordered: apixaban (Eliquis). Sequential Compression Devices Ordered. Patient Centered Rounds: I performed bedside rounds with nursing staff today. Discussions with Specialists or Other Care Team Provider: case management     Education and Discussions with Family / Patient: patient indicated she would update family .      Total Time Spent on Date of Encounter in care of patient: 45 minutes This time was spent on one or more of the following: performing physical exam; counseling and coordination of care; obtaining or reviewing history; documenting in the medical record; reviewing/ordering tests, medications or procedures; communicating with other healthcare professionals and discussing with patient's family/caregivers. Current Length of Stay: 1 day(s)  Current Patient Status: Inpatient   Certification Statement: The patient will continue to require additional inpatient hospital stay due to IV antibiotics, IV lasix, repeat labs in am   Discharge Plan: Anticipate discharge in 48-72 hrs to discharge location to be determined pending rehab evaluations. Code Status: Level 1 - Full Code    Subjective:   Patient seen sitting up in bed resting comfortably, eating breakfast.  Reports that she slept well, feels much better than when she first came in. Denies any fevers or chills overnight. Denies any cough. Denies any headache or dizziness, no chest pain or shortness of breath. Objective:     Vitals:   Temp (24hrs), Av.1 °F (36.7 °C), Min:97.8 °F (36.6 °C), Max:98.6 °F (37 °C)    Temp:  [97.8 °F (36.6 °C)-98.6 °F (37 °C)] 98.6 °F (37 °C)  HR:  [] 91  Resp:  [17-19] 18  BP: ()/(54-78) 113/54  SpO2:  [88 %-100 %] 97 %  Body mass index is 47.58 kg/m². Input and Output Summary (last 24 hours): Intake/Output Summary (Last 24 hours) at 2023 1501  Last data filed at 2023 0401  Gross per 24 hour   Intake 1700 ml   Output 450 ml   Net 1250 ml       Physical Exam:   Physical Exam  Vitals and nursing note reviewed. Constitutional:       General: She is not in acute distress. HENT:      Head: Normocephalic. Nose: Nose normal.      Mouth/Throat:      Mouth: Mucous membranes are moist.   Eyes:      Extraocular Movements: Extraocular movements intact. Conjunctiva/sclera: Conjunctivae normal.   Cardiovascular:      Rate and Rhythm: Normal rate and regular rhythm. Pulses: Normal pulses. Pulmonary:      Effort: Pulmonary effort is normal.      Breath sounds: No wheezing. Comments: Diminished   Abdominal:      General: Bowel sounds are normal. There is no distension.       Palpations: Abdomen is soft. Tenderness: There is no abdominal tenderness. Genitourinary:     Comments: Voiding spontaneously   Musculoskeletal:      Cervical back: Normal range of motion. Right lower leg: Edema present. Left lower leg: Edema present. Skin:     General: Skin is warm and dry. Capillary Refill: Capillary refill takes less than 2 seconds. Neurological:      General: No focal deficit present. Mental Status: She is alert and oriented to person, place, and time. Psychiatric:         Mood and Affect: Mood normal.         Behavior: Behavior normal.         Thought Content:  Thought content normal.         Judgment: Judgment normal.          Additional Data:     Labs:  Results from last 7 days   Lab Units 08/12/23  1158 08/11/23  1236   WBC Thousand/uL 15.48* 16.88*   HEMOGLOBIN g/dL 10.9* 11.9   HEMATOCRIT % 36.0 37.6   PLATELETS Thousands/uL 153 171   NEUTROS PCT %  --  83*   LYMPHS PCT %  --  8*   MONOS PCT %  --  7   EOS PCT %  --  1     Results from last 7 days   Lab Units 08/12/23  0512 08/11/23  1236   SODIUM mmol/L 137 137   POTASSIUM mmol/L 4.5 4.5   CHLORIDE mmol/L 104 97   CO2 mmol/L 24 33*   BUN mg/dL 36* 35*   CREATININE mg/dL 1.82* 1.64*   ANION GAP mmol/L 9 7   CALCIUM mg/dL 8.3* 9.0   ALBUMIN g/dL  --  3.7   TOTAL BILIRUBIN mg/dL  --  0.56   ALK PHOS U/L  --  54   ALT U/L  --  12   AST U/L  --  15   GLUCOSE RANDOM mg/dL 118 190*     Results from last 7 days   Lab Units 08/11/23  1236   INR  1.28*     Results from last 7 days   Lab Units 08/12/23  1200 08/12/23  0706 08/11/23  2029 08/11/23  1720 08/11/23  1459   POC GLUCOSE mg/dl 164* 111 241* 132 158*         Results from last 7 days   Lab Units 08/12/23  0512 08/11/23  1236   LACTIC ACID mmol/L  --  1.2   PROCALCITONIN ng/ml 0.23 0.08       Lines/Drains:  Invasive Devices     Peripheral Intravenous Line  Duration           Peripheral IV 08/11/23 Distal;Right;Upper;Ventral (anterior) Arm 1 day    Peripheral IV 08/11/23 Dorsal (posterior); Right Forearm 1 day                      Imaging: Reviewed radiology reports from this admission including: chest xray    Recent Cultures (last 7 days):   Results from last 7 days   Lab Units 08/12/23  0557 08/11/23  1236   BLOOD CULTURE   --  Received in Microbiology Lab. Culture in Progress. Received in Microbiology Lab. Culture in Progress.    LEGIONELLA URINARY ANTIGEN  Negative  --        Last 24 Hours Medication List:   Current Facility-Administered Medications   Medication Dose Route Frequency Provider Last Rate   • acetaminophen  650 mg Oral Q6H PRN RK Angel     • apixaban  5 mg Oral BID RK Angel     • atorvastatin  20 mg Oral Daily With Dinner RK Angel     • cefTRIAXone  1,000 mg Intravenous Q24H RK Angel 1,000 mg (08/12/23 1301)    And   • azithromycin  500 mg Oral Q24H RK Angel     • fluticasone  1 spray Nasal Daily RK Angel     • Fluticasone Furoate-Vilanterol  1 puff Inhalation Daily RK Angel     • insulin glargine  25 Units Subcutaneous HS RK Angel     • insulin lispro  10 Units Subcutaneous TID With Meals RK Angel     • insulin lispro  2-12 Units Subcutaneous TID AC RK Angel     • insulin lispro  2-12 Units Subcutaneous HS RK Angel     • insulin lispro  2-12 Units Subcutaneous 0200 RK Angel     • metoprolol succinate  50 mg Oral BID RK Shields Asp     • montelukast  10 mg Oral HS RK Angel     • pantoprazole  40 mg Oral Daily RK Angel     • pregabalin  100 mg Oral BID RK Angel     • saccharomyces boulardii  250 mg Oral BID RK Shields Asp     • [START ON 8/13/2023] torsemide  20 mg Oral Daily RK Shields Asp          Today, Patient Was Seen By: RK Shields Asp    **Please Note: This note may have been constructed using a voice recognition system. **

## 2023-08-12 NOTE — ASSESSMENT & PLAN NOTE
Lab Results   Component Value Date    EGFR 27 08/12/2023    EGFR 31 08/11/2023    EGFR 27 04/30/2023    CREATININE 1.82 (H) 08/12/2023    CREATININE 1.64 (H) 08/11/2023    CREATININE 1.82 (H) 04/30/2023   Creatinine at baseline  Repeat BMP in a.m.

## 2023-08-12 NOTE — PLAN OF CARE
Problem: RESPIRATORY - ADULT  Goal: Achieves optimal ventilation and oxygenation  Description: INTERVENTIONS:  - Assess for changes in respiratory status  - Assess for changes in mentation and behavior  - Position to facilitate oxygenation and minimize respiratory effort  - Oxygen administered by appropriate delivery if ordered  - Initiate smoking cessation education as indicated  - Encourage broncho-pulmonary hygiene including cough, deep breathe, Incentive Spirometry  - Assess the need for suctioning and aspirate as needed  - Assess and instruct to report SOB or any respiratory difficulty  - Respiratory Therapy support as indicated  Outcome: Progressing     Problem: CARDIOVASCULAR - ADULT  Goal: Absence of cardiac dysrhythmias or at baseline rhythm  Description: INTERVENTIONS:  - Continuous cardiac monitoring, vital signs, obtain 12 lead EKG if ordered  - Administer antiarrhythmic and heart rate control medications as ordered  - Monitor electrolytes and administer replacement therapy as ordered  Outcome: Progressing

## 2023-08-13 LAB
ANION GAP SERPL CALCULATED.3IONS-SCNC: 7 MMOL/L
BACTERIA UR CULT: ABNORMAL
BACTERIA UR CULT: ABNORMAL
BUN SERPL-MCNC: 37 MG/DL (ref 5–25)
CALCIUM SERPL-MCNC: 7.9 MG/DL (ref 8.4–10.2)
CHLORIDE SERPL-SCNC: 104 MMOL/L (ref 96–108)
CO2 SERPL-SCNC: 29 MMOL/L (ref 21–32)
CREAT SERPL-MCNC: 2.01 MG/DL (ref 0.6–1.3)
ERYTHROCYTE [DISTWIDTH] IN BLOOD BY AUTOMATED COUNT: 15.6 % (ref 11.6–15.1)
EST. AVERAGE GLUCOSE BLD GHB EST-MCNC: 220 MG/DL
GFR SERPL CREATININE-BSD FRML MDRD: 24 ML/MIN/1.73SQ M
GLUCOSE SERPL-MCNC: 108 MG/DL (ref 65–140)
GLUCOSE SERPL-MCNC: 116 MG/DL (ref 65–140)
GLUCOSE SERPL-MCNC: 128 MG/DL (ref 65–140)
GLUCOSE SERPL-MCNC: 134 MG/DL (ref 65–140)
GLUCOSE SERPL-MCNC: 161 MG/DL (ref 65–140)
GLUCOSE SERPL-MCNC: 235 MG/DL (ref 65–140)
HBA1C MFR BLD: 9.3 %
HCT VFR BLD AUTO: 33 % (ref 34.8–46.1)
HGB BLD-MCNC: 10.1 G/DL (ref 11.5–15.4)
LACTATE SERPL-SCNC: 0.6 MMOL/L (ref 0.5–2)
MCH RBC QN AUTO: 28.6 PG (ref 26.8–34.3)
MCHC RBC AUTO-ENTMCNC: 30.6 G/DL (ref 31.4–37.4)
MCV RBC AUTO: 94 FL (ref 82–98)
PLATELET # BLD AUTO: 133 THOUSANDS/UL (ref 149–390)
PMV BLD AUTO: 11.1 FL (ref 8.9–12.7)
POTASSIUM SERPL-SCNC: 4.2 MMOL/L (ref 3.5–5.3)
RBC # BLD AUTO: 3.53 MILLION/UL (ref 3.81–5.12)
SODIUM SERPL-SCNC: 140 MMOL/L (ref 135–147)
WBC # BLD AUTO: 14.04 THOUSAND/UL (ref 4.31–10.16)

## 2023-08-13 PROCEDURE — 80048 BASIC METABOLIC PNL TOTAL CA: CPT | Performed by: NURSE PRACTITIONER

## 2023-08-13 PROCEDURE — 82948 REAGENT STRIP/BLOOD GLUCOSE: CPT

## 2023-08-13 PROCEDURE — 99232 SBSQ HOSP IP/OBS MODERATE 35: CPT | Performed by: FAMILY MEDICINE

## 2023-08-13 PROCEDURE — 93005 ELECTROCARDIOGRAM TRACING: CPT

## 2023-08-13 PROCEDURE — 97167 OT EVAL HIGH COMPLEX 60 MIN: CPT

## 2023-08-13 PROCEDURE — 87040 BLOOD CULTURE FOR BACTERIA: CPT | Performed by: FAMILY MEDICINE

## 2023-08-13 PROCEDURE — 97163 PT EVAL HIGH COMPLEX 45 MIN: CPT | Performed by: PHYSICAL THERAPIST

## 2023-08-13 PROCEDURE — 85027 COMPLETE CBC AUTOMATED: CPT | Performed by: NURSE PRACTITIONER

## 2023-08-13 PROCEDURE — 83605 ASSAY OF LACTIC ACID: CPT | Performed by: FAMILY MEDICINE

## 2023-08-13 PROCEDURE — 83036 HEMOGLOBIN GLYCOSYLATED A1C: CPT | Performed by: NURSE PRACTITIONER

## 2023-08-13 PROCEDURE — 97535 SELF CARE MNGMENT TRAINING: CPT

## 2023-08-13 RX ORDER — CEFEPIME HYDROCHLORIDE 2 G/50ML
2000 INJECTION, SOLUTION INTRAVENOUS EVERY 12 HOURS
Status: DISCONTINUED | OUTPATIENT
Start: 2023-08-13 | End: 2023-08-13

## 2023-08-13 RX ORDER — ALBUMIN (HUMAN) 12.5 G/50ML
12.5 SOLUTION INTRAVENOUS ONCE
Status: COMPLETED | OUTPATIENT
Start: 2023-08-13 | End: 2023-08-13

## 2023-08-13 RX ORDER — NYSTATIN 100000 [USP'U]/G
POWDER TOPICAL 2 TIMES DAILY
Status: DISCONTINUED | OUTPATIENT
Start: 2023-08-13 | End: 2023-08-22 | Stop reason: HOSPADM

## 2023-08-13 RX ORDER — SODIUM CHLORIDE 9 MG/ML
50 INJECTION, SOLUTION INTRAVENOUS CONTINUOUS
Status: DISCONTINUED | OUTPATIENT
Start: 2023-08-13 | End: 2023-08-13

## 2023-08-13 RX ORDER — ONDANSETRON 2 MG/ML
4 INJECTION INTRAMUSCULAR; INTRAVENOUS EVERY 6 HOURS PRN
Status: DISCONTINUED | OUTPATIENT
Start: 2023-08-13 | End: 2023-08-22 | Stop reason: HOSPADM

## 2023-08-13 RX ORDER — AMMONIUM LACTATE 12 G/100G
1 LOTION TOPICAL 2 TIMES DAILY PRN
Status: DISCONTINUED | OUTPATIENT
Start: 2023-08-13 | End: 2023-08-22 | Stop reason: HOSPADM

## 2023-08-13 RX ORDER — ALBUMIN, HUMAN INJ 5% 5 %
12.5 SOLUTION INTRAVENOUS ONCE
Status: COMPLETED | OUTPATIENT
Start: 2023-08-13 | End: 2023-08-13

## 2023-08-13 RX ADMIN — MONTELUKAST 10 MG: 10 TABLET, FILM COATED ORAL at 21:32

## 2023-08-13 RX ADMIN — INSULIN LISPRO 10 UNITS: 100 INJECTION, SOLUTION INTRAVENOUS; SUBCUTANEOUS at 12:56

## 2023-08-13 RX ADMIN — PREGABALIN 100 MG: 100 CAPSULE ORAL at 08:07

## 2023-08-13 RX ADMIN — PANTOPRAZOLE SODIUM 40 MG: 40 TABLET, DELAYED RELEASE ORAL at 08:07

## 2023-08-13 RX ADMIN — Medication 250 MG: at 08:07

## 2023-08-13 RX ADMIN — ONDANSETRON 4 MG: 2 INJECTION INTRAMUSCULAR; INTRAVENOUS at 17:52

## 2023-08-13 RX ADMIN — ACETAMINOPHEN 650 MG: 325 TABLET ORAL at 17:53

## 2023-08-13 RX ADMIN — CEFTRIAXONE 1000 MG: 1 INJECTION, SOLUTION INTRAVENOUS at 13:03

## 2023-08-13 RX ADMIN — METOPROLOL SUCCINATE 50 MG: 50 TABLET, EXTENDED RELEASE ORAL at 18:15

## 2023-08-13 RX ADMIN — PREGABALIN 100 MG: 100 CAPSULE ORAL at 18:15

## 2023-08-13 RX ADMIN — INSULIN LISPRO 4 UNITS: 100 INJECTION, SOLUTION INTRAVENOUS; SUBCUTANEOUS at 21:32

## 2023-08-13 RX ADMIN — INSULIN LISPRO 10 UNITS: 100 INJECTION, SOLUTION INTRAVENOUS; SUBCUTANEOUS at 08:12

## 2023-08-13 RX ADMIN — APIXABAN 5 MG: 5 TABLET, FILM COATED ORAL at 18:15

## 2023-08-13 RX ADMIN — INSULIN GLARGINE 25 UNITS: 100 INJECTION, SOLUTION SUBCUTANEOUS at 21:32

## 2023-08-13 RX ADMIN — METOPROLOL SUCCINATE 50 MG: 50 TABLET, EXTENDED RELEASE ORAL at 08:07

## 2023-08-13 RX ADMIN — FLUTICASONE PROPIONATE 1 SPRAY: 50 SPRAY, METERED NASAL at 08:08

## 2023-08-13 RX ADMIN — ATORVASTATIN CALCIUM 20 MG: 20 TABLET, FILM COATED ORAL at 18:16

## 2023-08-13 RX ADMIN — FLUTICASONE FUROATE AND VILANTEROL TRIFENATATE 1 PUFF: 100; 25 POWDER RESPIRATORY (INHALATION) at 08:08

## 2023-08-13 RX ADMIN — APIXABAN 5 MG: 5 TABLET, FILM COATED ORAL at 08:07

## 2023-08-13 RX ADMIN — NYSTATIN: 100000 POWDER TOPICAL at 18:15

## 2023-08-13 RX ADMIN — Medication 250 MG: at 18:15

## 2023-08-13 RX ADMIN — ALBUMIN (HUMAN) 12.5 G: 12.5 INJECTION, SOLUTION INTRAVENOUS at 00:36

## 2023-08-13 RX ADMIN — ALBUMIN (HUMAN) 12.5 G: 0.25 INJECTION, SOLUTION INTRAVENOUS at 21:32

## 2023-08-13 RX ADMIN — INSULIN LISPRO 10 UNITS: 100 INJECTION, SOLUTION INTRAVENOUS; SUBCUTANEOUS at 18:15

## 2023-08-13 RX ADMIN — INSULIN LISPRO 2 UNITS: 100 INJECTION, SOLUTION INTRAVENOUS; SUBCUTANEOUS at 18:14

## 2023-08-13 NOTE — PLAN OF CARE
Problem: RESPIRATORY - ADULT  Goal: Achieves optimal ventilation and oxygenation  Description: INTERVENTIONS:  - Assess for changes in respiratory status  - Assess for changes in mentation and behavior  - Position to facilitate oxygenation and minimize respiratory effort  - Oxygen administered by appropriate delivery if ordered  - Initiate smoking cessation education as indicated  - Encourage broncho-pulmonary hygiene including cough, deep breathe, Incentive Spirometry  - Assess the need for suctioning and aspirate as needed  - Assess and instruct to report SOB or any respiratory difficulty  - Respiratory Therapy support as indicated  Outcome: Progressing     Problem: CARDIOVASCULAR - ADULT  Goal: Maintains optimal cardiac output and hemodynamic stability  Description: INTERVENTIONS:  - Monitor I/O, vital signs and rhythm  - Monitor for S/S and trends of decreased cardiac output  - Administer and titrate ordered vasoactive medications to optimize hemodynamic stability  - Assess quality of pulses, skin color and temperature  - Assess for signs of decreased coronary artery perfusion  - Instruct patient to report change in severity of symptoms  Outcome: Progressing  Goal: Absence of cardiac dysrhythmias or at baseline rhythm  Description: INTERVENTIONS:  - Continuous cardiac monitoring, vital signs, obtain 12 lead EKG if ordered  - Administer antiarrhythmic and heart rate control medications as ordered  - Monitor electrolytes and administer replacement therapy as ordered  Outcome: Progressing     Problem: METABOLIC, FLUID AND ELECTROLYTES - ADULT  Goal: Electrolytes maintained within normal limits  Description: INTERVENTIONS:  - Monitor labs and assess patient for signs and symptoms of electrolyte imbalances  - Administer electrolyte replacement as ordered  - Monitor response to electrolyte replacements, including repeat lab results as appropriate  - Instruct patient on fluid and nutrition as appropriate  Outcome: Progressing  Goal: Fluid balance maintained  Description: INTERVENTIONS:  - Monitor labs   - Monitor I/O and WT  - Instruct patient on fluid and nutrition as appropriate  - Assess for signs & symptoms of volume excess or deficit  Outcome: Progressing  Goal: Glucose maintained within target range  Description: INTERVENTIONS:  - Monitor Blood Glucose as ordered  - Assess for signs and symptoms of hyperglycemia and hypoglycemia  - Administer ordered medications to maintain glucose within target range  - Assess nutritional intake and initiate nutrition service referral as needed  Outcome: Progressing     Problem: SKIN/TISSUE INTEGRITY - ADULT  Goal: Skin Integrity remains intact(Skin Breakdown Prevention)  Description: Assess:  -Perform Hung assessment every 12 hours  -Clean and moisturize skin every 12 hours  -Inspect skin when repositioning, toileting, and assisting with ADLS  -Assess under medical devices such as BP cuff every 3 hours  -Assess extremities for adequate circulation and sensation     Bed Management:  -Have minimal linens on bed & keep smooth, unwrinkled  -Change linens as needed when moist or perspiring  -Avoid sitting or lying in one position for more than 3 hours while in bed  -Keep HOB at 30 degrees     Toileting:  -Offer bedside commode  -Assess for incontinence every 2 hours  -Use incontinent care products after each incontinent episode    Activity:  -Mobilize patient 3 times a day  -Encourage activity and walks on unit  -Encourage or provide ROM exercises   -Turn and reposition patient every 2 Hours  -Use appropriate equipment to lift or move patient in bed  -Instruct/ Assist with weight shifting every 2 hours when out of bed in chair  -Consider limitation of chair time 4 hour intervals    Skin Care:  -Avoid use of baby powder, tape, friction and shearing, hot water or constrictive clothing  -Relieve pressure over bony prominences  -Do not massage red bony areas    Next Steps:  -Teach patient strategies to minimize risks  -Consider consults to  interdisciplinary teams  Outcome: Progressing  Goal: Incision(s), wounds(s) or drain site(s) healing without S/S of infection  Description: INTERVENTIONS  - Assess and document dressing, incision, wound bed, drain sites and surrounding tissue  - Provide patient and family education  - Perform skin care/dressing changes every 12 hours  Outcome: Progressing  Goal: Pressure injury heals and does not worsen  Description: Interventions:  - Implement low air loss mattress or specialty surface (Criteria met)  - Apply silicone foam dressing  - Instruct/assist with weight shifting every 120 minutes when in chair   - Limit chair time to 4 hour intervals  - Use special pressure reducing interventions such as waffle cushion when in chair   - Apply fecal or urinary incontinence containment device   - Perform passive or active ROM every 12 hours  - Turn and reposition patient & offload bony prominences every 2 hours   - Utilize friction reducing device or surface for transfers   - Consider consults to  interdisciplinary teams  - Use incontinent care products after each incontinent episode  - Consider nutrition services referral as needed  Outcome: Progressing     Problem: INFECTION - ADULT  Goal: Absence or prevention of progression during hospitalization  Description: INTERVENTIONS:  - Assess and monitor for signs and symptoms of infection  - Monitor lab/diagnostic results  - Monitor all insertion sites, i.e. indwelling lines, tubes, and drains  - Monitor endotracheal if appropriate and nasal secretions for changes in amount and color  - [place] appropriate cooling/warming therapies per order  - Administer medications as ordered  - Instruct and encourage patient and family to use good hand hygiene technique  - Identify and instruct in appropriate isolation precautions for identified infection/condition  Outcome: Progressing  Goal: Absence of fever/infection during neutropenic period  Description: INTERVENTIONS:  - Monitor WBC    Outcome: Progressing     Problem: SAFETY ADULT  Goal: Patient will remain free of falls  Description: INTERVENTIONS:  - Educate patient/family on patient safety including physical limitations  - Instruct patient to call for assistance with activity   - Consult OT/PT to assist with strengthening/mobility   - Keep Call bell within reach  - Keep bed low and locked with side rails adjusted as appropriate  - Keep care items and personal belongings within reach  - Initiate and maintain comfort rounds  - Make Fall Risk Sign visible to staff  - Offer Toileting every 2 Hours, in advance of need  - Initiate/Maintain bed alarm  - Obtain necessary fall risk management equipment  - Apply yellow socks and bracelet for high fall risk patients  - Consider moving patient to room near nurses station  Outcome: Progressing  Goal: Maintain or return to baseline ADL function  Description: INTERVENTIONS:  -  Assess patient's ability to carry out ADLs; assess patient's baseline for ADL function and identify physical deficits which impact ability to perform ADLs (bathing, care of mouth/teeth, toileting, grooming, dressing, etc.)  - Assess/evaluate cause of self-care deficits   - Assess range of motion  - Assess patient's mobility; develop plan if impaired  - Assess patient's need for assistive devices and provide as appropriate  - Encourage maximum independence but intervene and supervise when necessary  - Involve family in performance of ADLs  - Assess for home care needs following discharge   - Consider OT consult to assist with ADL evaluation and planning for discharge  - Provide patient education as appropriate  Outcome: Progressing       Problem: DISCHARGE PLANNING  Goal: Discharge to home or other facility with appropriate resources  Description: INTERVENTIONS:  - Identify barriers to discharge w/patient and caregiver  - Arrange for needed discharge resources and transportation as appropriate  - Identify discharge learning needs (meds, wound care, etc.)  - Arrange for interpretive services to assist at discharge as needed  - Refer to Case Management Department for coordinating discharge planning if the patient needs post-hospital services based on physician/advanced practitioner order or complex needs related to functional status, cognitive ability, or social support system  Outcome: Progressing     Problem: Knowledge Deficit  Goal: Patient/family/caregiver demonstrates understanding of disease process, treatment plan, medications, and discharge instructions  Description: Complete learning assessment and assess knowledge base. Interventions:  - Provide teaching at level of understanding  - Provide teaching via preferred learning methods  Outcome: Progressing     Problem: MOBILITY - ADULT  Goal: Maintain or return to baseline ADL function  Description: INTERVENTIONS:  -  Assess patient's ability to carry out ADLs; assess patient's baseline for ADL function and identify physical deficits which impact ability to perform ADLs (bathing, care of mouth/teeth, toileting, grooming, dressing, etc.)  - Assess/evaluate cause of self-care deficits   - Assess range of motion  - Assess patient's mobility; develop plan if impaired  - Assess patient's need for assistive devices and provide as appropriate  - Encourage maximum independence but intervene and supervise when necessary  - Involve family in performance of ADLs  - Assess for home care needs following discharge   - Consider OT consult to assist with ADL evaluation and planning for discharge  - Provide patient education as appropriate  Outcome: Progressing  Goal: Maintains/Returns to pre admission functional level  Description: INTERVENTIONS:  - Perform BMAT or MOVE assessment daily.   - Set and communicate daily mobility goal to care team and patient/family/caregiver.    - Collaborate with rehabilitation services on mobility goals if consulted  - Perform Range of Motion 2 times a day. - Reposition patient every 2 hours.   - Dangle patient 3 times a day  - Stand patient 3 times a day  - Ambulate patient 3 times a day  - Out of bed to chair 3 times a day   - Out of bed for meals 3 times a day  - Out of bed for toileting  - Record patient progress and toleration of activity level   Outcome: Progressing     Problem: Prexisting or High Potential for Compromised Skin Integrity  Goal: Skin integrity is maintained or improved  Description: INTERVENTIONS:  - Identify patients at risk for skin breakdown  - Assess and monitor skin integrity  - Assess and monitor nutrition and hydration status  - Monitor labs   - Assess for incontinence   - Turn and reposition patient  - Assist with mobility/ambulation  - Relieve pressure over bony prominences  - Avoid friction and shearing  - Provide appropriate hygiene as needed including keeping skin clean and dry  - Evaluate need for skin moisturizer/barrier cream  - Collaborate with interdisciplinary team   - Patient/family teaching  - Consider wound care consult   Outcome: Progressing

## 2023-08-13 NOTE — ASSESSMENT & PLAN NOTE
Patient reports feeling unwell for 2 days with intermittent shortness of breath. Morning of admission, when she woke up her heart was racing prompting her to come to the emergency department. On arrival to the ER she was found to have fever of 103. She meets sepsis criteria with fever, tachycardia, leukocytosis. She had a chest x-ray which revealed a right base infiltrate. Flu and COVID-negative  · Admitted to medicine  · Started on Rocephin, and azithromycin; urine antigens negative, will de-escalate azithromycin   · Given BMI of 49.9, received IV fluid based on ideal body weight, and received an additional liter of IV fluids Lactic normal  · 8/12 AM patient demonstrated bilateral lower extremity edema; BP stable; give one time dose lasix 20mg and resume home medication demadex in AM 8/13  · Procalcitonin 0.08, repeat 0.23.   · Blood cultures negative to date and urine cultures pending  · Urine for strep and legionella negative  · Supportive therapies with missy, respiratory protocol

## 2023-08-13 NOTE — PLAN OF CARE
Problem: PHYSICAL THERAPY ADULT  Goal: Performs mobility at highest level of function for planned discharge setting. See evaluation for individualized goals. Description: Treatment/Interventions: ADL retraining, Functional transfer training, LE strengthening/ROM, Therapeutic exercise, Patient/family training, Bed mobility, Gait training, Spoke to nursing          See flowsheet documentation for full assessment, interventions and recommendations. Note:    Problem List: Decreased strength, Decreased endurance, Impaired balance, Decreased mobility, Obesity, Pain  Assessment: Patient seen for Physical Therapy evaluation. Patient admitted with Sepsis due to pneumonia [facility]). Comorbidities affecting patient's physical performance include: cancer, CKD, COPD, diabetes, hypertension and obesity. Personal factors affecting patient at time of initial evaluation include: inability to ambulate household distances, inability to navigate community distances, inability to perform caregiver support/tasks, inability to perform ADLS and inability to perform IADLS . Prior to admission, patient was requiring assist for ADLS, requiring assist for IADLS, living with niece and family in a single level home with 3 steps to enter and ambulating household distance. Please find objective findings from Physical Therapy assessment regarding body systems outlined above with impairments and limitations including weakness, impaired balance, decreased endurance, impaired coordination, gait deviations, pain, decreased activity tolerance, decreased functional mobility tolerance and fall risk. The Barthel Index was used as a functional outcome tool presenting with a score of Barthel Index Score: 40 today indicating marked limitations of functional mobility and ADLS.   Patient's clinical presentation is currently unstable/unpredictable as seen in patient's presentation of increased fall risk, new onset of impairment of functional mobility, decreased endurance and new onset of weakness. Pt would benefit from continued Physical Therapy treatment to address deficits as defined above and maximize level of functional mobility. As demonstrated by objective findings, the assigned level of complexity for this evaluation is high. The patient's AM-[facility] Basic Mobility Inpatient Short Form Raw Score is 12. A Raw score of less than or equal to 16 suggests the patient may benefit from discharge to post-acute rehabilitation services. Please also refer to the recommendation of the Physical Therapist for safe discharge planning. PT Discharge Recommendation: Post acute rehabilitation services    See flowsheet documentation for full assessment.

## 2023-08-13 NOTE — OCCUPATIONAL THERAPY NOTE
Occupational Therapy Evaluation/Treatment Note       08/13/23 1005   Note Type   Note type Evaluation   Pain Assessment   Pain Assessment Tool 0-10   Pain Score 10 - Worst Possible Pain   Pain Location/Orientation Orientation: Bilateral;Location: Foot   Restrictions/Precautions   Other Precautions Chair Alarm; Bed Alarm; Fall Risk   Home Living   Type of [address] [facility] [address] One level;Performs ADLs on one level   Bathroom Shower/Tub Tub/shower unit   Bathroom Toilet Standard   Bathroom Equipment Shower chair   Home Equipment Walker;Cane;Wheelchair-manual;Hospital bed   Prior Function   Level of [place] Independent with functional mobility; Independent with ADLs; Needs assistance with IADLS  (assist for bathing, otherwise independent in ADLs)   Lives With Family  (Niece)   Receives Help From Family   IADLs Family/Friend/Other provides transportation; Family/Friend/Other provides meals; Family/Friend/Other provides medication management   Comments Patient reports PTA independent in ambulation without AD; independent with most ADLs, gets assist for tub transfers and bathing from niece; Niece assists with all iADLs   General   Additional Pertinent History Patient presented to hospital with SOB, found to have sepsis and PNA   ADL   Eating Assistance 7  Independent   Grooming Assistance 5  Supervision/Setup   UB Bathing Assistance 4  Minimal Assistance   LB Bathing Assistance 3  Moderate [address] 3  Moderate Assistance   LB Dressing Deficit Don/doff R sock; Don/doff L sock   Toileting Assistance  4  Minimal Assistance   Bed Mobility   Additional Comments Patient received sitting OOB in recliner chair   Transfers   Sit to Stand 3  Moderate assistance   Additional items Assist x 1;Verbal cues   Stand to Sit 3  Moderate assistance   Additional items Assist x 1;Verbal cues   Additional Comments STS with support of RW   Functional Mobility Functional Mobility 3  Moderate assistance   Additional Comments Few steps with RW: pt with c/o bilateral foot pain and cramping, reports has not been on her feet in three days   Balance   Static Sitting Fair   Dynamic Sitting Fair -   Static Standing Poor +   Activity Tolerance   Activity Tolerance Patient limited by fatigue;Patient limited by pain   RUE Assessment   RUE Assessment WFL   LUE Assessment   LUE Assessment WFL  (Limited shoulder flexion to 100*, pt reports has not gained full motion of shoulder replacement but able to perform all ADLs within current AROM limitations)   Cognition   Overall Cognitive Status [facility]   Arousal/Participation Alert; Cooperative   Attention Within functional limits   Orientation Level Oriented X4   Memory Within functional limits   Following Commands Follows all commands and directions without difficulty   Assessment   Limitation Decreased ADL status; Decreased UE strength;Decreased Safe judgement during ADL;Decreased endurance;Decreased self-care trans;Decreased high-level ADLs   Prognosis Good   Assessment Patient evaluated by Occupational Therapy. Patient admitted with Sepsis due to pneumonia [facility]). The patients occupational profile, medical and therapy history includes a extensive additional review of physical, cognitive, or psychosocial history related to current functional performance. Comorbidities affecting functional mobility and ADLS include: asthma, GERD, HLD, Covid 19, anemia, CKD, DM, Afib, L total shoulder arthroplasty.   Prior to admission, patient was independent with functional mobility without assistive device, independent with ADLS, requiring assist for IADLS and home with family assist.  The evaluation identifies the following performance deficits: weakness, impaired balance, decreased endurance, increased fall risk, new onset of impairment of functional mobility, decreased ADLS, decreased IADLS, decreased activity tolerance, decreased safety awareness and decreased strength, that result in activity limitations and/or participation restrictions. This evaluation requires clinical decision making of high complexity, because the patient presents with comorbidites that affect occupational performance and required significant modification of tasks or assistance with consideration of multiple treatment options. The Barthel Index was used as a functional outcome tool presenting with a score of Barthel Index Score: 40, indicating marked limitations of functional mobility and ADLS. The patient's raw score on the AM-PAC Daily Activity Inpatient Short Form is 19. A raw score of greater than or equal to 19 suggests the patient may benefit from discharge to home. Please refer to the recommendation of the Occupational Therapist for safe discharge planning. Patient will benefit from skilled Occupational Therapy services to address above deficits and facilitate a safe return to prior level of function. Goals   Patient Goals 'To move more'   STG Time Frame   (1-7 days)   Short Term Goal  Goals established to promote Patient Goals: 'To move more':  Grooming: independent seated; Bathing: min assist; Upper Body Dressing supervision; Lower Body Dressing: min assist; Toileting: supervision; Patient will increase ambulatory standard toilet transfer to supervision with rolling walker to increase performance and safety with ADLS and functional mobility; Patient will increase standing tolerance to 5 minutes during ADL task to decrease assistance level and decrease fall risk; Patient will increase bed mobility to min assist in preparation for ADLS and transfers;  Patient will increase functional mobility to and from bathroom with rolling walker with supervision to increase performance with ADLS and to use a toilet; Patient will tolerate 5 minutes of UE ROM/strengthening to increase general activity tolerance and performance in ADLS/IADLS; Patient will improve functional activity tolerance to 5 minutes of sustained functional tasks to increase participation in basic self-care and decrease assistance level;  Patient will be able to to verbalize understanding and perform energy conservation/proper body mechanics during ADLS and functional mobility at least 75% of the time with minimal cueing to decrease signs of fatigue and increase stamina to return to prior level of function; Patient will increase dynamic sitting balance to fair to improve the ability to sit at edge of bed or on a chair for ADLS;  Patient will increase static/dynamic standing balance to fair- to improve postural stability and decrease fall risk during standing ADLS and transfers   LTG Time Frame   (8-14 days)   Long Term Goal Grooming: independent standing at sink; Bathing: supervision; Upper Body Dressing independent; Lower Body Dressing: supervision; Toileting: independent; Patient will increase ambulatory standard toilet transfer to independent with rolling walker to increase performance and safety with ADLS and functional mobility; Patient will increase standing tolerance to 10 minutes during ADL task to decrease assistance level and decrease fall risk; Patient will increase bed mobility to independent in preparation for ADLS and transfers;  Patient will increase functional mobility to and from bathroom with rolling walker independently to increase performance with ADLS and to use a toilet; Patient will tolerate 10 minutes of UE ROM/strengthening to increase general activity tolerance and performance in ADLS/IADLS; Patient will improve functional activity tolerance to 10 minutes of sustained functional tasks to increase participation in basic self-care and decrease assistance level;  Patient will be able to to verbalize understanding and perform energy conservation/proper body mechanics during ADLS and functional mobility at least 90% of the time with no cueing to decrease signs of fatigue and increase stamina to return to prior level of function; Patient will increase static/dynamic sitting balance to fair+ to improve the ability to sit at edge of bed or on a chair for ADLS;  Patient will increase static/dynamic standing balance to fair to improve postural stability and decrease fall risk during standing ADLS and transfers. Pt will score >/= 24/24 on AM-PAC Daily Activity Inpatient scale to promote safe independence with ADLs and functional mobility; Pt will score >/= 75/100 on Barthel Index in order to decrease caregiver assistance needed and increase ability to perform ADLs and functional mobility. Plan   Treatment Interventions ADL retraining;Functional transfer training;UE strengthening/ROM; Endurance training;Patient/family training;Equipment evaluation/education; Compensatory technique education;Continued evaluation; Energy conservation; Activityengagement   Goal Expiration Date 08/27/23   OT Frequency 3-5x/wk   Recommendation   OT Discharge Recommendation Home with home health rehabilitation   [facility] Daily Activity Inpatient   Lower Body Dressing 2   Bathing 3   Toileting 3   Upper Body Dressing 3   Grooming 4   Eating 4   Daily Activity Raw Score 19   Daily Activity Standardized Score (Calc for Raw Score >=11) 40.22   AM-PAC Applied Cognition Inpatient   Following a Speech/Presentation 4   Understanding Ordinary Conversation 4   Taking Medications 4   Remembering Where Things Are Placed or Put Away 4   Remembering List of 4-5 Errands 4   Taking Care of Complicated Tasks 4   Applied Cognition Raw Score 24   Applied Cognition Standardized Score 62.21   Barthel Index   Feeding 10   Bathing 0   Grooming Score 0   Dressing Score 5   Bladder Score 5   Bowels Score 10   Toilet Use Score 5   Transfers (Bed/Chair) Score 5   Mobility (Level Surface) Score 0   Stairs Score 0   Barthel Index Score 40   Additional Treatment Session   Start Time 0955   End Time 1007   Treatment Assessment S: "I feel so stiff after not moving for three days" O: Patient performed STS from recliner with min assist; Ambulated short household distance to/from bathroom with RW and min assist; Ambulatory transfer to/from standard height toilet with RW and mod assist, patient with difficulty rising from toilet; Toilet hygiene completed in standing with supervision; Stand to sit to recliner chair with supervision; A: Patient motivated to participate in therapy and functional performance improved with increased activity; Pt reported pain to bilateral feet decreased with increased activity.  At end of session patient seated in recliner chair with all needs met, chair alarm set; P: Home with continued family assist, home OT   Licensure   [place] License Number  Blanca Leung [id number]

## 2023-08-13 NOTE — ASSESSMENT & PLAN NOTE
Lab Results   Component Value Date    HGBA1C 8.1 (H) 03/25/2023       Recent Labs     08/12/23 2059 08/13/23  0143 08/13/23  0716 08/13/23  1112   POCGLU 153* 134 116 128       Blood Sugar Average: Last 72 hrs:  (P) 154.7   Continue 25 units of long-acting insulin at bedtime, 10 units of Humalog 3 times daily with meals and insulin sliding scale  Check hemoglobin A1c in a.m.

## 2023-08-13 NOTE — PROGRESS NOTES
[address]  Progress Note  Name: Janet Bush  MRN: [id number]  Unit/Bed#: [address] [place] [address]01 I Date of Admission: 8/11/2023   Date of Service: 8/13/2023 I Hospital Day: 2    Assessment/Plan   * Sepsis due to pneumonia [facility])  Assessment & Plan  Patient reports feeling unwell for 2 days with intermittent shortness of breath. Morning of admission, when she woke up her heart was racing prompting her to come to the emergency department. On arrival to the ER she was found to have fever of 103. She meets sepsis criteria with fever, tachycardia, leukocytosis. She had a chest x-ray which revealed a right base infiltrate. Flu and COVID-negative  · Admitted to medicine  · Started on Rocephin, and azithromycin; urine antigens negative, will de-escalate azithromycin   · Given BMI of 49.9, received IV fluid based on ideal body weight, and received an additional liter of IV fluids Lactic normal  · 8/12 AM patient demonstrated bilateral lower extremity edema; BP stable; give one time dose lasix 20mg and resume home medication demadex in AM 8/13  · Procalcitonin 0.08, repeat 0.23. · Blood cultures negative to date and urine cultures pending  · Urine for strep and legionella negative  · Supportive therapies with duonebs, respiratory protocol      Chronic respiratory failure with hypoxia and hypercapnia ([address])  Assessment & Plan  Patient wears oxygen at home at night with her CPAP but is not oxygen dependent during the day  Keep O2 sats greater than 92%    Paroxysmal atrial fibrillation (HCC)  Assessment & Plan  Continue Eliquis  Metoprolol initially was held secondary to hypotension.   Blood pressure improved, metoprolol resumed    Stage 3 chronic kidney disease [facility])  Assessment & Plan  Lab Results   Component Value Date    EGFR 24 08/13/2023    EGFR 27 08/12/2023    EGFR 31 08/11/2023    CREATININE 2.01 (H) 08/13/2023    CREATININE 1.82 (H) 08/12/2023    CREATININE 1.64 (H) 08/11/2023   Creatinine slightly elevated  Bladder scan 150 mL  Hold parameters for medications for SBP less than 110  Repeat BMP in a.m. Essential hypertension  Assessment & Plan  Initially held metoprolol and Demadex  Blood pressure improved, metoprolol resumed. Patient appeared volume overloaded with +4 pitting edema bilateral lower extremities, received one-time dose of Lasix 20 mg IV on 8/12. Continue to hold Demadex on 8/13 as overnight patient did have a period of hypotension. May consider resuming in a.m as BP improving, currently 140/59  Monitor. Ductal carcinoma in situ (DCIS) of right breast  Assessment & Plan  Patient had breast mass removed recently and is in the process of seeing radiation oncology  Outpatient follow-up    Morbid obesity with BMI of 45.0-49.9, adult [facility])  Assessment & Plan  Dietary lifestyle modifications    Type 2 diabetes mellitus with hyperglycemia, without long-term current use of insulin [facility])  Assessment & Plan  Lab Results   Component Value Date    HGBA1C 8.1 (H) 03/25/2023       Recent Labs     08/12/23  2059 08/13/23  0143 08/13/23  0716 08/13/23  1112   POCGLU 153* 134 116 128       Blood Sugar Average: Last 72 hrs:  (P) 154.7   Continue 25 units of long-acting insulin at bedtime, 10 units of Humalog 3 times daily with meals and insulin sliding scale  Check hemoglobin A1c in a.m.    KATRINA (obstructive sleep apnea)  Assessment & Plan  Continue CPAP               VTE Pharmacologic Prophylaxis: VTE Score: 5 High Risk (Score >/= 5) - Pharmacological DVT Prophylaxis Ordered: apixaban (Eliquis). Sequential Compression Devices Ordered. Patient Centered Rounds: I performed bedside rounds with nursing staff today. Discussions with Specialists or Other Care Team Provider: Nursing    Education and Discussions with Family / Patient: Patient indicated that she would update her family.      Total Time Spent on Date of Encounter in care of patient: 45 minutes This time was spent on one or more of the following: performing physical exam; counseling and coordination of care; obtaining or reviewing history; documenting in the medical record; reviewing/ordering tests, medications or procedures; communicating with other healthcare professionals and discussing with patient's family/caregivers. Current Length of Stay: 2 day(s)  Current Patient Status: Inpatient   Certification Statement: The patient will continue to require additional inpatient hospital stay due to Continued IV antibiotics, repeat lab work  Discharge Plan: Anticipate discharge in 24-48 hrs to discharge location to be determined pending rehab evaluations. Code Status: Level 1 - Full Code    Subjective:   Patient seen sitting up in chair resting comfortably. Reports that she is more comfortable sitting up in the chair. Slept well, currently off oxygen. Good appetite no nausea or vomiting. Feels that she is improving. Objective:     Vitals:   Temp (24hrs), Av.1 °F (37.3 °C), Min:98.6 °F (37 °C), Max:99.8 °F (37.7 °C)    Temp:  [98.6 °F (37 °C)-99.8 °F (37.7 °C)] 98.8 °F (37.1 °C)  HR:  [] 78  Resp:  [18] 18  BP: ()/(40-76) 140/59  SpO2:  [91 %-100 %] 100 %  Body mass index is 48.32 kg/m². Input and Output Summary (last 24 hours): Intake/Output Summary (Last 24 hours) at 2023 1453  Last data filed at 2023 0600  Gross per 24 hour   Intake 20 ml   Output 1000 ml   Net -980 ml       Physical Exam:   Physical Exam  Vitals and nursing note reviewed. Constitutional:       General: She is not in acute distress. HENT:      Head: Normocephalic. Nose: Nose normal.      Mouth/Throat:      Mouth: Mucous membranes are moist.   Eyes:      Extraocular Movements: Extraocular movements intact. Conjunctiva/sclera: Conjunctivae normal.      Pupils: Pupils are equal, round, and reactive to light. Cardiovascular:      Rate and Rhythm: Normal rate and regular rhythm. Pulses: Normal pulses.       Heart sounds: Normal heart sounds. Pulmonary:      Effort: Pulmonary effort is normal.      Comments: Diminished at bases   Abdominal:      General: Bowel sounds are normal.      Palpations: Abdomen is soft. Tenderness: There is no abdominal tenderness. Comments: Abdomen obese   Genitourinary:     Comments: Voiding spontaneously  Musculoskeletal:         General: Normal range of motion. Cervical back: Normal range of motion. Right lower leg: Edema present. Left lower leg: Edema present. Comments: Bilateral lower extremity edema appears less than prior exam   Skin:     General: Skin is warm. Capillary Refill: Capillary refill takes less than 2 seconds. Comments: Bilateral redness underneath breasts noted; patient also has scaly patch on left ankle extensor region   Neurological:      General: No focal deficit present. Mental Status: She is alert and oriented to person, place, and time. Psychiatric:         Mood and Affect: Mood normal.         Behavior: Behavior normal.         Thought Content: Thought content normal.         Judgment: Judgment normal.          Additional Data:     Labs:  Results from last 7 days   Lab Units 08/13/23  0446 08/12/23  1158 08/11/23  1236   WBC Thousand/uL 14.04*   < > 16.88*   HEMOGLOBIN g/dL 10.1*   < > 11.9   HEMATOCRIT % 33.0*   < > 37.6   PLATELETS Thousands/uL 133*   < > 171   NEUTROS PCT %  --   --  83*   LYMPHS PCT %  --   --  8*   MONOS PCT %  --   --  7   EOS PCT %  --   --  1    < > = values in this interval not displayed.      Results from last 7 days   Lab Units 08/13/23  0446 08/12/23  0512 08/11/23  1236   SODIUM mmol/L 140   < > 137   POTASSIUM mmol/L 4.2   < > 4.5   CHLORIDE mmol/L 104   < > 97   CO2 mmol/L 29   < > 33*   BUN mg/dL 37*   < > 35*   CREATININE mg/dL 2.01*   < > 1.64*   ANION GAP mmol/L 7   < > 7   CALCIUM mg/dL 7.9*   < > 9.0   ALBUMIN g/dL  --   --  3.7   TOTAL BILIRUBIN mg/dL  --   --  0.56   ALK PHOS U/L  --   --  54 ALT U/L  --   --  12   AST U/L  --   --  15   GLUCOSE RANDOM mg/dL 108   < > 190*    < > = values in this interval not displayed. Results from last 7 days   Lab Units 08/11/23  1236   INR  1.28*     Results from last 7 days   Lab Units 08/13/23  1112 08/13/23  0716 08/13/23  0143 08/12/23  2059 08/12/23  1634 08/12/23  1200 08/12/23  0706 08/11/23  2029 08/11/23  1720 08/11/23  1459   POC GLUCOSE mg/dl 128 116 134 153* 210* 164* 111 241* 132 158*         Results from last 7 days   Lab Units 08/12/23  0512 08/11/23  1236   LACTIC ACID mmol/L  --  1.2   PROCALCITONIN ng/ml 0.23 0.08       Lines/Drains:  Invasive Devices     Peripheral Intravenous Line  Duration           Peripheral IV 08/11/23 Distal;Right;Upper;Ventral (anterior) Arm 2 days    Peripheral IV 08/11/23 Dorsal (posterior); Right Forearm 2 days          Drain  Duration           External Urinary Catheter <1 day                      Imaging: No pertinent imaging reviewed. Recent Cultures (last 7 days):   Results from last 7 days   Lab Units 08/12/23  0557 08/11/23  1236   BLOOD CULTURE   --  No Growth at 24 hrs. No Growth at 24 hrs.    LEGIONELLA URINARY ANTIGEN  Negative  --        Last 24 Hours Medication List:   Current Facility-Administered Medications   Medication Dose Route Frequency Provider Last Rate   • acetaminophen  650 mg Oral Q6H PRN RK Ardon     • apixaban  5 mg Oral BID RK Ardon     • atorvastatin  20 mg Oral Daily With Dinner RK Ardon     • cefTRIAXone  1,000 mg Intravenous Q24H RK Ardon 1,000 mg (08/13/23 1303)   • fluticasone  1 spray Nasal Daily RK Ardon     • Fluticasone Furoate-Vilanterol  1 puff Inhalation Daily RK Ardon     • insulin glargine  25 Units Subcutaneous HS RK Ardon     • insulin lispro  10 Units Subcutaneous TID With Meals RK Ardon     • insulin lispro  2-12 Units Subcutaneous  [facility], RK     • insulin lispro  2-12 Units Subcutaneous HS RK Hinton     • insulin lispro  2-12 Units Subcutaneous [address] Gloria[address]ber     • metoprolol succinate  50 mg Oral BID RK Leggett     • montelukast  10 mg Oral HS RK Hinton     • nystatin   Topical BID RK Leggett     • pantoprazole  40 mg Oral Daily GloriaRK Webber     • pregabalin  100 mg Oral BID RK Hinton     • saccharomyces boulardii  250 mg Oral BID RK Leggett          Today, Patient Was Seen By: RK Leggett    **Please Note: This note may have been constructed using a voice recognition system. **

## 2023-08-13 NOTE — PHYSICAL THERAPY NOTE
PT EVALUATION     Time In: 4576  Time Out: 0945 08/13/23 0925   PT Last Visit   PT Visit Date 08/13/23   Note Type   Note type Evaluation   Pain Assessment   Pain Assessment Tool 0-10   Pain Score 10 - Worst Possible Pain   Pain Location/Orientation Orientation: Bilateral;Location: Foot   Hospital Pain Intervention(s) Repositioned; Ambulation/increased activity; Emotional support   Restrictions/Precautions   Other Precautions Chair Alarm; Bed Alarm;Multiple lines;O2;Fall Risk   Home Living   Type of [address] [place] [address] One level; Able to live on main level with bedroom/bathroom  (Pt reports she has a hospital bed)   Bathroom Shower/Tub Tub/shower unit   [facility] chair   Bathroom Accessibility Accessible via walker   Home Equipment Walker;Cane;Wheelchair-manual   Additional Comments Pt reports her niece helps her with showering   Prior Function   Level of [place] Needs assistance with ADLs; Needs assistance with IADLS   Lives With Family  (Niece)   Receives Help From Family   IADLs Family/Friend/Other provides transportation; Family/Friend/Other provides meals; Family/Friend/Other provides medication management   Falls in the last 6 months 0   Vocational Retired   General   Additional Pertinent History Pt admitted 8/12/23 for ongoing SOB ongoing for a couple days. Pt ultimately diagnosed with sepsis due to pneumonia. Family/Caregiver Present No   Cognition   Overall Cognitive Status WFL   Arousal/Participation Cooperative   Orientation Level Oriented X4   Memory Within functional limits   Following Commands Follows all commands and directions without difficulty   RLE Assessment   RLE Assessment WFL  (3+/5 all myotomes)   LLE Assessment   LLE Assessment WFL  (3+/5 all myotomes)   Bed Mobility   Additional Comments Pt received by PT in bedside chair, assess bed mobility as able next visit.    Transfers   Sit to Stand 3  Moderate assistance   Additional items Assist x 1   Stand to Sit 3  Moderate assistance   Additional items Assist x 1   Toilet transfer 3  Moderate assistance   Additional items Assist x 1; Increased time required;Standard toilet   Ambulation/Elevation   Gait pattern Forward Flexion; Wide ONUR; Decreased foot clearance;Shuffling;Excessively slow   Gait Assistance 3  Moderate assist   Additional items Assist x 1   Assistive Device Rolling walker   Distance 20 feet bedside chair <> toilet   Stair Management Assistance Not tested   Ambulation/Elevation Additional Comments Pt able to perform all ambulation without room O2 today, oxygen saturation remaining above 92% throughout session. Balance   Static Sitting Fair   Dynamic Sitting Fair -   Static Standing Poor +   Dynamic Standing Poor +   Ambulatory Poor   Activity Tolerance   Activity Tolerance Patient tolerated treatment well;Patient limited by fatigue;Patient limited by pain   Nurse Made Aware RN Mikaela   Assessment   Problem List Decreased strength;Decreased endurance; Impaired balance;Decreased mobility;Obesity;Pain   Assessment Patient seen for Physical Therapy evaluation. Patient admitted with Sepsis due to pneumonia [facility]). Comorbidities affecting patient's physical performance include: cancer, CKD, COPD, diabetes, hypertension and obesity. Personal factors affecting patient at time of initial evaluation include: inability to ambulate household distances, inability to navigate community distances, inability to perform caregiver support/tasks, inability to perform ADLS and inability to perform IADLS . Prior to admission, patient was requiring assist for ADLS, requiring assist for IADLS, living with niece and family in a single level home with 3 steps to enter and ambulating household distance.   Please find objective findings from Physical Therapy assessment regarding body systems outlined above with impairments and limitations including weakness, impaired balance, decreased endurance, impaired coordination, gait deviations, pain, decreased activity tolerance, decreased functional mobility tolerance and fall risk. The Barthel Index was used as a functional outcome tool presenting with a score of Barthel Index Score: 40 today indicating marked limitations of functional mobility and ADLS. Patient's clinical presentation is currently unstable/unpredictable as seen in patient's presentation of increased fall risk, new onset of impairment of functional mobility, decreased endurance and new onset of weakness. Pt would benefit from continued Physical Therapy treatment to address deficits as defined above and maximize level of functional mobility. As demonstrated by objective findings, the assigned level of complexity for this evaluation is high. The patient's [facility] Basic Mobility Inpatient Short Form Raw Score is 12. A Raw score of less than or equal to 16 suggests the patient may benefit from discharge to post-acute rehabilitation services. Please also refer to the recommendation of the Physical Therapist for safe discharge planning. Goals   STG Expiration Date 08/20/23   Short Term Goal #1 Pt will improve bilateral LE strength by 1/2 grade   Short Term Goal #2 Pt will be supervision for all bed mobility and transfers   LTG Expiration Date 08/27/23   Long Term Goal #1 Pt will be able to ambulate 20 feet supervision with RW at time of discharge   Long Term Goal #2 Pt will be independent with HEP at time of discharge. Plan   Treatment/Interventions ADL retraining;Functional transfer training;LE strengthening/ROM; Therapeutic exercise;Patient/family training;Bed mobility;Gait training;Spoke to nursing   PT Frequency   (5x/wk)   Recommendation   PT Discharge Recommendation Post acute rehabilitation services   [facility] Basic Mobility Inpatient   Turning in Flat Bed Without Bedrails 2   Lying on Back to Sitting on Edge of Flat Bed Without Bedrails 2   Moving Bed to Chair 2   Standing Up From Chair Using Arms 2 Walk in Room 2   Climb 3-5 Stairs With Railing 2   Basic Mobility Inpatient Raw Score 12   Basic Mobility Standardized Score 32.23   Highest Level Of Mobility   -[facility] Goal 4: Move to chair/commode   -HL Achieved 6: Walk 10 steps or more   Barthel Index   Feeding 10   Bathing 0   Grooming Score 0   Dressing Score 0   Bladder Score 10   Bowels Score 10   Toilet Use Score 5   Transfers (Bed/Chair) Score 5   Mobility (Level Surface) Score 0   Stairs Score 0   Barthel Index Score 40   End of Consult   Patient Position at End of Consult Bedside chair;Bed/Chair alarm activated; All needs within reach   [facility] [place] Insurance Number  [facility] PT, DPT [id number]

## 2023-08-13 NOTE — ASSESSMENT & PLAN NOTE
Lab Results   Component Value Date    EGFR 24 08/13/2023    EGFR 27 08/12/2023    EGFR 31 08/11/2023    CREATININE 2.01 (H) 08/13/2023    CREATININE 1.82 (H) 08/12/2023    CREATININE 1.64 (H) 08/11/2023   Creatinine slightly elevated  Bladder scan 150 mL  Hold parameters for medications for SBP less than 110  Repeat BMP in a.m.

## 2023-08-13 NOTE — ASSESSMENT & PLAN NOTE
Initially held metoprolol and Demadex  Blood pressure improved, metoprolol resumed. Patient appeared volume overloaded with +4 pitting edema bilateral lower extremities, received one-time dose of Lasix 20 mg IV on 8/12. Continue to hold Demadex on 8/13 as overnight patient did have a period of hypotension. May consider resuming in a.m as BP improving, currently 140/59  Monitor.

## 2023-08-14 ENCOUNTER — APPOINTMENT (INPATIENT)
Dept: RADIOLOGY | Facility: HOSPITAL | Age: 73
DRG: 853 | End: 2023-08-14
Payer: MEDICARE

## 2023-08-14 LAB
ANION GAP SERPL CALCULATED.3IONS-SCNC: 10 MMOL/L
ATRIAL RATE: 111 BPM
ATRIAL RATE: 144 BPM
ATRIAL RATE: 95 BPM
BUN SERPL-MCNC: 47 MG/DL (ref 5–25)
CALCIUM SERPL-MCNC: 8.2 MG/DL (ref 8.4–10.2)
CHLORIDE SERPL-SCNC: 101 MMOL/L (ref 96–108)
CO2 SERPL-SCNC: 27 MMOL/L (ref 21–32)
CREAT SERPL-MCNC: 2.69 MG/DL (ref 0.6–1.3)
ERYTHROCYTE [DISTWIDTH] IN BLOOD BY AUTOMATED COUNT: 15.7 % (ref 11.6–15.1)
GFR SERPL CREATININE-BSD FRML MDRD: 17 ML/MIN/1.73SQ M
GLUCOSE SERPL-MCNC: 142 MG/DL (ref 65–140)
GLUCOSE SERPL-MCNC: 154 MG/DL (ref 65–140)
GLUCOSE SERPL-MCNC: 165 MG/DL (ref 65–140)
GLUCOSE SERPL-MCNC: 180 MG/DL (ref 65–140)
GLUCOSE SERPL-MCNC: 230 MG/DL (ref 65–140)
GLUCOSE SERPL-MCNC: 239 MG/DL (ref 65–140)
HCT VFR BLD AUTO: 31.8 % (ref 34.8–46.1)
HGB BLD-MCNC: 9.6 G/DL (ref 11.5–15.4)
MCH RBC QN AUTO: 28.4 PG (ref 26.8–34.3)
MCHC RBC AUTO-ENTMCNC: 30.2 G/DL (ref 31.4–37.4)
MCV RBC AUTO: 94 FL (ref 82–98)
P AXIS: 24 DEGREES
P AXIS: 50 DEGREES
PLATELET # BLD AUTO: 142 THOUSANDS/UL (ref 149–390)
PMV BLD AUTO: 10.9 FL (ref 8.9–12.7)
POTASSIUM SERPL-SCNC: 4.5 MMOL/L (ref 3.5–5.3)
PR INTERVAL: 166 MS
PR INTERVAL: 166 MS
PROCALCITONIN SERPL-MCNC: 0.42 NG/ML
QRS AXIS: -31 DEGREES
QRS AXIS: -31 DEGREES
QRS AXIS: -37 DEGREES
QRSD INTERVAL: 66 MS
QRSD INTERVAL: 78 MS
QRSD INTERVAL: 80 MS
QT INTERVAL: 306 MS
QT INTERVAL: 322 MS
QT INTERVAL: 346 MS
QTC INTERVAL: 416 MS
QTC INTERVAL: 434 MS
QTC INTERVAL: 451 MS
RBC # BLD AUTO: 3.38 MILLION/UL (ref 3.81–5.12)
SODIUM SERPL-SCNC: 138 MMOL/L (ref 135–147)
T WAVE AXIS: 39 DEGREES
T WAVE AXIS: 42 DEGREES
T WAVE AXIS: 48 DEGREES
VENTRICULAR RATE: 111 BPM
VENTRICULAR RATE: 118 BPM
VENTRICULAR RATE: 95 BPM
WBC # BLD AUTO: 14.02 THOUSAND/UL (ref 4.31–10.16)

## 2023-08-14 PROCEDURE — G1004 CDSM NDSC: HCPCS

## 2023-08-14 PROCEDURE — 99232 SBSQ HOSP IP/OBS MODERATE 35: CPT

## 2023-08-14 PROCEDURE — 71250 CT THORAX DX C-: CPT

## 2023-08-14 PROCEDURE — 93010 ELECTROCARDIOGRAM REPORT: CPT | Performed by: INTERNAL MEDICINE

## 2023-08-14 PROCEDURE — 80048 BASIC METABOLIC PNL TOTAL CA: CPT | Performed by: NURSE PRACTITIONER

## 2023-08-14 PROCEDURE — 84145 PROCALCITONIN (PCT): CPT | Performed by: FAMILY MEDICINE

## 2023-08-14 PROCEDURE — 82948 REAGENT STRIP/BLOOD GLUCOSE: CPT

## 2023-08-14 PROCEDURE — 85027 COMPLETE CBC AUTOMATED: CPT | Performed by: NURSE PRACTITIONER

## 2023-08-14 RX ADMIN — NYSTATIN: 100000 POWDER TOPICAL at 08:25

## 2023-08-14 RX ADMIN — INSULIN LISPRO 4 UNITS: 100 INJECTION, SOLUTION INTRAVENOUS; SUBCUTANEOUS at 21:26

## 2023-08-14 RX ADMIN — FLUTICASONE PROPIONATE 1 SPRAY: 50 SPRAY, METERED NASAL at 08:24

## 2023-08-14 RX ADMIN — PREGABALIN 100 MG: 100 CAPSULE ORAL at 08:23

## 2023-08-14 RX ADMIN — MONTELUKAST 10 MG: 10 TABLET, FILM COATED ORAL at 21:27

## 2023-08-14 RX ADMIN — CEFEPIME 2000 MG: 2 INJECTION, POWDER, FOR SOLUTION INTRAVENOUS at 05:47

## 2023-08-14 RX ADMIN — Medication 250 MG: at 08:23

## 2023-08-14 RX ADMIN — INSULIN GLARGINE 25 UNITS: 100 INJECTION, SOLUTION SUBCUTANEOUS at 21:26

## 2023-08-14 RX ADMIN — INSULIN LISPRO 2 UNITS: 100 INJECTION, SOLUTION INTRAVENOUS; SUBCUTANEOUS at 01:50

## 2023-08-14 RX ADMIN — INSULIN LISPRO 2 UNITS: 100 INJECTION, SOLUTION INTRAVENOUS; SUBCUTANEOUS at 16:30

## 2023-08-14 RX ADMIN — METOPROLOL SUCCINATE 50 MG: 50 TABLET, EXTENDED RELEASE ORAL at 17:53

## 2023-08-14 RX ADMIN — NYSTATIN: 100000 POWDER TOPICAL at 17:59

## 2023-08-14 RX ADMIN — CEFEPIME 2000 MG: 2 INJECTION, POWDER, FOR SOLUTION INTRAVENOUS at 17:54

## 2023-08-14 RX ADMIN — Medication 250 MG: at 17:53

## 2023-08-14 RX ADMIN — ATORVASTATIN CALCIUM 20 MG: 20 TABLET, FILM COATED ORAL at 16:29

## 2023-08-14 RX ADMIN — PREGABALIN 100 MG: 100 CAPSULE ORAL at 17:54

## 2023-08-14 RX ADMIN — APIXABAN 5 MG: 5 TABLET, FILM COATED ORAL at 08:23

## 2023-08-14 RX ADMIN — INSULIN LISPRO 2 UNITS: 100 INJECTION, SOLUTION INTRAVENOUS; SUBCUTANEOUS at 07:56

## 2023-08-14 RX ADMIN — INSULIN LISPRO 10 UNITS: 100 INJECTION, SOLUTION INTRAVENOUS; SUBCUTANEOUS at 16:30

## 2023-08-14 RX ADMIN — FLUTICASONE FUROATE AND VILANTEROL TRIFENATATE 1 PUFF: 100; 25 POWDER RESPIRATORY (INHALATION) at 08:24

## 2023-08-14 RX ADMIN — Medication 1 APPLICATION: at 08:24

## 2023-08-14 RX ADMIN — INSULIN LISPRO 10 UNITS: 100 INJECTION, SOLUTION INTRAVENOUS; SUBCUTANEOUS at 07:57

## 2023-08-14 RX ADMIN — INSULIN LISPRO 4 UNITS: 100 INJECTION, SOLUTION INTRAVENOUS; SUBCUTANEOUS at 11:47

## 2023-08-14 RX ADMIN — INSULIN LISPRO 10 UNITS: 100 INJECTION, SOLUTION INTRAVENOUS; SUBCUTANEOUS at 11:47

## 2023-08-14 RX ADMIN — METOPROLOL SUCCINATE 50 MG: 50 TABLET, EXTENDED RELEASE ORAL at 08:23

## 2023-08-14 RX ADMIN — PANTOPRAZOLE SODIUM 40 MG: 40 TABLET, DELAYED RELEASE ORAL at 08:23

## 2023-08-14 RX ADMIN — APIXABAN 5 MG: 5 TABLET, FILM COATED ORAL at 17:53

## 2023-08-14 NOTE — WOUND OSTOMY CARE
Progress Note - Wound   Hazel Walker 72 y.o. female MRN: [id number]  Unit/Bed#: [address] [place] [address] Encounter: [id number]      Assessment: This is a 72year old female patient admitted on 8/11/23 with sepsis/pneumonia. 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 She was AAO x 3 and agreeable to have wound visit done. Assessment Findings:  1-Severe MASD to bilateral breast, abdomen and groin with some areas of partial thickness skin denudement. Orders in place for skin care. 2-Dry brown eschar to bilateral buttocks due to patient scratching - orders in place for skin care and for prevention. 3-Unstageable pressure injury to mid-sacrum present on admission with 99% yellow slough and 1% pink granulation tissue. Orders in place for wound care and for prevention. 4-Bilateral heels intact - orders in place for skin care and for prevention. Plan:   Skin care plans:     1-Calazime to bilateral/mid sacrum and buttocks BID and PRN  2-Apply Hydraguard to left anterior lower leg and heels BID & PRN. 3-Cleanse skin folds to bilateral breasts, bilateral/mid abdomen and bilateral groin with foaming cleanser & pat dry. Apply light dusting of Nystatin powder 2x/day & gently remove excess. 4-Float heels on 2 pillows to offload pressure so heels are not in contact with mattress or pillows. 5-Ehob pressure redistribution cushion in chair when out of bed. Limit sitting for 1-2 hours at a time due to mid-sacrobuttock wound then offload back in bed turning side to side every 2 hours. 6-Moisturize skin daily with skin nourishing cream.  7-Turn/reposition q2h or when medically stable for pressure re-distribution on skin. Wound 05/30/23 Breast Right (Active)              Wound 08/14/23 Foot Anterior; Left (Active)   Wound Image   08/14/23 1057   Wound Description Epithelialization 08/14/23 1057   Marlene-wound Assessment Intact;Edema 08/14/23 1057   Wound Length (cm) 0 cm 08/14/23 1057   Wound Width (cm) 0 cm 08/14/23 1057   Wound Depth (cm) 0 cm 08/14/23 1057   Wound Surface Area (cm^2) 0 cm^2 08/14/23 1057   Wound Volume (cm^3) 0 cm^3 08/14/23 1057   Calculated Wound Volume (cm^3) 0 cm^3 08/14/23 1057   Change in Wound Size % 100 08/14/23 1057   Drainage Amount Scant 08/14/23 1048   Drainage Description Serosanguineous 08/14/23 1048   Treatments Cleansed 08/14/23 1057   Dressing Moisture barrier 08/14/23 1057   Dressing Changed New 08/14/23 1057   Dressing Status Intact 08/14/23 1057       Wound 08/14/23 Pressure Injury Sacrum Mid (Active)   Wound Image   08/14/23 1040   Pressure Injury Stage Unstageable 08/14/23 1040   Marlene-wound Assessment Hyperpigmented; Purple 08/14/23 1040   Wound Length (cm) 2.3 cm 08/14/23 1040   Wound Width (cm) 0.3 cm 08/14/23 1040   Wound Depth (cm) 0.1 cm 08/14/23 1040   Wound Surface Area (cm^2) 0.69 cm^2 08/14/23 1040   Wound Volume (cm^3) 0.069 cm^3 08/14/23 1040   Calculated Wound Volume (cm^3) 0.07 cm^3 08/14/23 1040   Drainage Amount Scant 08/14/23 1040   Drainage Description Serous;Clear 08/14/23 1040   Non-staged Wound Description Full thickness 08/14/23 1040   Treatments Cleansed 08/14/23 1040   Dressing Moisture barrier 08/14/23 1040   Dressing Changed New 08/14/23 1040   Dressing Status Intact 08/14/23 1040       Wound 08/14/23 Abrasion(s) Buttocks Right (Active)   Wound Image   08/14/23 1040   Wound Description Brown;Dry;Eschar;Slough; Yellow 08/14/23 1040   Wound Length (cm) 4 cm 08/14/23 1040   Wound Width (cm) 2.5 cm 08/14/23 1040   Wound Depth (cm) 0.1 cm 08/14/23 1040   Wound Surface Area (cm^2) 10 cm^2 08/14/23 1040   Wound Volume (cm^3) 1 cm^3 08/14/23 1040   Calculated Wound Volume (cm^3) 1 cm^3 08/14/23 1040   Drainage Amount None 08/14/23 1040   Non-staged Wound Description Full thickness 08/14/23 1040   Treatments Cleansed 08/14/23 1040   Dressing Moisture barrier 08/14/23 1040 Wound packed? No 08/14/23 1040   Dressing Changed New 08/14/23 1040   Dressing Status Intact 08/14/23 1040       Wound 08/14/23 Abrasion(s) Buttocks Left (Active)        Wound Description Brown;Dry;Eschar 08/14/23 1040   Wound Length (cm) 5 cm 08/14/23 1040   Wound Width (cm) 5 cm 08/14/23 1040   Wound Depth (cm) 0 cm 08/14/23 1040   Wound Surface Area (cm^2) 25 cm^2 08/14/23 1040   Wound Volume (cm^3) 0 cm^3 08/14/23 1040   Calculated Wound Volume (cm^3) 0 cm^3 08/14/23 1040   Drainage Amount None 08/14/23 1040   Non-staged Wound Description Full thickness 08/14/23 1040   Dressing Moisture barrier 08/14/23 1040   Dressing Changed New 08/14/23 1040   Dressing Status Intact 08/14/23 1040     Discussed assessment findings, and plan of care/recommendations with Miladis Horton RN. Wound care will follow along with patient throughout admission, please call or tiger text with questions and concerns. Recommendations written as orders.   Charles Murguia MSN, RN, Alejandra Alcala

## 2023-08-14 NOTE — DISCHARGE INSTR - OTHER ORDERS
Plan:   Skin care plans:     1-Calazime moisture barrier or equivalent zinc-based cream to bilateral/mid sacrum and buttocks BID and PRN  2-Apply Hydraguard barrier cream to left anterior lower leg and heels 2x/day & as needed. 3-Cleanse skin folds to bilateral breasts, bilateral/mid abdomen and bilateral groin with foaming cleanser & pat dry. Apply light dusting of Nystatin powder 2x/day & gently remove excess. 4-Float heels on 2 pillows to offload pressure so heels are not in contact with mattress or pillows. 5-Use pressure redistribution cushion in chair when out of bed. Limit sitting for 1-2 hours at a time due to mid-sacrobuttock wound then offload back in bed turning side to side every 2 hours. 6-Moisturize skin daily with skin nourishing cream.  7-Turn/reposition for pressure re-distribution on skin.

## 2023-08-14 NOTE — ASSESSMENT & PLAN NOTE
Lab Results   Component Value Date    HGBA1C 9.3 (H) 08/13/2023       Recent Labs     08/14/23  0138 08/14/23  0705 08/14/23  1111 08/14/23  1557   POCGLU 180* 154* 230* 165*       Blood Sugar Average: Last 72 hrs:  (P) 167   · Continue 25 units of long-acting insulin at bedtime, 10 units of Humalog 3 times daily with meals and insulin sliding scale

## 2023-08-14 NOTE — PLAN OF CARE
Problem: RESPIRATORY - ADULT  Goal: Achieves optimal ventilation and oxygenation  Description: INTERVENTIONS:  - Assess for changes in respiratory status  - Assess for changes in mentation and behavior  - Position to facilitate oxygenation and minimize respiratory effort  - Oxygen administered by appropriate delivery if ordered  - Initiate smoking cessation education as indicated  - Encourage broncho-pulmonary hygiene including cough, deep breathe, Incentive Spirometry  - Assess the need for suctioning and aspirate as needed  - Assess and instruct to report SOB or any respiratory difficulty  - Respiratory Therapy support as indicated  Outcome: Progressing     Problem: CARDIOVASCULAR - ADULT  Goal: Maintains optimal cardiac output and hemodynamic stability  Description: INTERVENTIONS:  - Monitor I/O, vital signs and rhythm  - Monitor for S/S and trends of decreased cardiac output  - Administer and titrate ordered vasoactive medications to optimize hemodynamic stability  - Assess quality of pulses, skin color and temperature  - Assess for signs of decreased coronary artery perfusion  - Instruct patient to report change in severity of symptoms  Outcome: Progressing  Goal: Absence of cardiac dysrhythmias or at baseline rhythm  Description: INTERVENTIONS:  - Continuous cardiac monitoring, vital signs, obtain 12 lead EKG if ordered  - Administer antiarrhythmic and heart rate control medications as ordered  - Monitor electrolytes and administer replacement therapy as ordered  Outcome: Progressing     Problem: METABOLIC, FLUID AND ELECTROLYTES - ADULT  Goal: Electrolytes maintained within normal limits  Description: INTERVENTIONS:  - Monitor labs and assess patient for signs and symptoms of electrolyte imbalances  - Administer electrolyte replacement as ordered  - Monitor response to electrolyte replacements, including repeat lab results as appropriate  - Instruct patient on fluid and nutrition as appropriate  Outcome: Progressing  Goal: Fluid balance maintained  Description: INTERVENTIONS:  - Monitor labs   - Monitor I/O and WT  - Instruct patient on fluid and nutrition as appropriate  - Assess for signs & symptoms of volume excess or deficit  Outcome: Progressing  Goal: Glucose maintained within target range  Description: INTERVENTIONS:  - Monitor Blood Glucose as ordered  - Assess for signs and symptoms of hyperglycemia and hypoglycemia  - Administer ordered medications to maintain glucose within target range  - Assess nutritional intake and initiate nutrition service referral as needed  Outcome: Progressing     Problem: SKIN/TISSUE INTEGRITY - ADULT  Goal: Skin Integrity remains intact(Skin Breakdown Prevention)  Description: Assess:  -Perform Hung assessment every 12 hours  -Clean and moisturize skin every 12 hours  -Inspect skin when repositioning, toileting, and assisting with ADLS  -Assess under medical devices such as BP cuff every 3 hours  -Assess extremities for adequate circulation and sensation     Bed Management:  -Have minimal linens on bed & keep smooth, unwrinkled  -Change linens as needed when moist or perspiring  -Avoid sitting or lying in one position for more than 3 hours while in bed  -Keep HOB at 30 degrees     Toileting:  -Offer bedside commode  -Assess for incontinence every 2 hours  -Use incontinent care products after each incontinent episode    Activity:  -Mobilize patient 3 times a day  -Encourage activity and walks on unit  -Encourage or provide ROM exercises   -Turn and reposition patient every 2 Hours  -Use appropriate equipment to lift or move patient in bed  -Instruct/ Assist with weight shifting every 2 hours when out of bed in chair  -Consider limitation of chair time 4 hour intervals    Skin Care:  -Avoid use of baby powder, tape, friction and shearing, hot water or constrictive clothing  -Relieve pressure over bony prominences  -Do not massage red bony areas    Next Steps:  -Teach patient strategies to minimize risks  -Consider consults to  interdisciplinary teams  Outcome: Progressing  Goal: Incision(s), wounds(s) or drain site(s) healing without S/S of infection  Description: INTERVENTIONS  - Assess and document dressing, incision, wound bed, drain sites and surrounding tissue  - Provide patient and family education  - Perform skin care/dressing changes as ordered and as needed  Outcome: Progressing  Goal: Pressure injury heals and does not worsen  Description: Interventions:  - Implement low air loss mattress or specialty surface (Criteria met)  - Apply silicone foam dressing  - Instruct/assist with weight shifting every 120 minutes when in chair   - Limit chair time to 4 hour intervals  - Use special pressure reducing interventions when in chair   - Apply fecal or urinary incontinence containment device   - Perform passive or active ROM every 12 hours  - Turn and reposition patient & offload bony prominences every 2 hours   - Utilize friction reducing device or surface for transfers   - Consider consults to  interdisciplinary teams  - Use incontinent care products after each incontinent episode  - Consider nutrition services referral as needed  Outcome: Progressing     Problem: INFECTION - ADULT  Goal: Absence or prevention of progression during hospitalization  Description: INTERVENTIONS:  - Assess and monitor for signs and symptoms of infection  - Monitor lab/diagnostic results  - Monitor all insertion sites, i.e. indwelling lines, tubes, and drains  - Monitor endotracheal if appropriate and nasal secretions for changes in amount and color  - [place] appropriate cooling/warming therapies per order  - Administer medications as ordered  - Instruct and encourage patient and family to use good hand hygiene technique  - Identify and instruct in appropriate isolation precautions for identified infection/condition  Outcome: Progressing  Goal: Absence of fever/infection during neutropenic period  Description: INTERVENTIONS:  - Monitor WBC    Outcome: Progressing     Problem: SAFETY ADULT  Goal: Patient will remain free of falls  Description: INTERVENTIONS:  - Educate patient/family on patient safety including physical limitations  - Instruct patient to call for assistance with activity   - Consult OT/PT to assist with strengthening/mobility   - Keep Call bell within reach  - Keep bed low and locked with side rails adjusted as appropriate  - Keep care items and personal belongings within reach  - Initiate and maintain comfort rounds  - Make Fall Risk Sign visible to staff  - Offer Toileting every 2 Hours, in advance of need  - Initiate/Maintain bed alarm  - Obtain necessary fall risk management equipment  - Apply yellow socks and bracelet for high fall risk patients  - Consider moving patient to room near nurses station  Outcome: Progressing  Goal: Maintain or return to baseline ADL function  Description: INTERVENTIONS:  -  Assess patient's ability to carry out ADLs; assess patient's baseline for ADL function and identify physical deficits which impact ability to perform ADLs (bathing, care of mouth/teeth, toileting, grooming, dressing, etc.)  - Assess/evaluate cause of self-care deficits   - Assess range of motion  - Assess patient's mobility; develop plan if impaired  - Assess patient's need for assistive devices and provide as appropriate  - Encourage maximum independence but intervene and supervise when necessary  - Involve family in performance of ADLs  - Assess for home care needs following discharge   - Consider OT consult to assist with ADL evaluation and planning for discharge  - Provide patient education as appropriate  Outcome: Progressing    Problem: DISCHARGE PLANNING  Goal: Discharge to home or other facility with appropriate resources  Description: INTERVENTIONS:  - Identify barriers to discharge w/patient and caregiver  - Arrange for needed discharge resources and transportation as appropriate  - Identify discharge learning needs (meds, wound care, etc.)  - Arrange for interpretive services to assist at discharge as needed  - Refer to Case Management Department for coordinating discharge planning if the patient needs post-hospital services based on physician/advanced practitioner order or complex needs related to functional status, cognitive ability, or social support system  Outcome: Progressing     Problem: Knowledge Deficit  Goal: Patient/family/caregiver demonstrates understanding of disease process, treatment plan, medications, and discharge instructions  Description: Complete learning assessment and assess knowledge base. Interventions:  - Provide teaching at level of understanding  - Provide teaching via preferred learning methods  Outcome: Progressing     Problem: MOBILITY - ADULT  Goal: Maintain or return to baseline ADL function  Description: INTERVENTIONS:  -  Assess patient's ability to carry out ADLs; assess patient's baseline for ADL function and identify physical deficits which impact ability to perform ADLs (bathing, care of mouth/teeth, toileting, grooming, dressing, etc.)  - Assess/evaluate cause of self-care deficits   - Assess range of motion  - Assess patient's mobility; develop plan if impaired  - Assess patient's need for assistive devices and provide as appropriate  - Encourage maximum independence but intervene and supervise when necessary  - Involve family in performance of ADLs  - Assess for home care needs following discharge   - Consider OT consult to assist with ADL evaluation and planning for discharge  - Provide patient education as appropriate  Outcome: Progressing  Goal: Maintains/Returns to pre admission functional level  Description: INTERVENTIONS:  - Perform BMAT or MOVE assessment daily.   - Set and communicate daily mobility goal to care team and patient/family/caregiver.    - Collaborate with rehabilitation services on mobility goals if consulted  - Perform Range of Motion 2 times a day. - Reposition patient every 2 hours.   - Dangle patient 3 times a day  - Stand patient 3 times a day  - Ambulate patient 3 times a day  - Out of bed to chair 3 times a day   - Out of bed for meals 3 times a day  - Out of bed for toileting  - Record patient progress and toleration of activity level   Outcome: Progressing     Problem: Prexisting or High Potential for Compromised Skin Integrity  Goal: Skin integrity is maintained or improved  Description: INTERVENTIONS:  - Identify patients at risk for skin breakdown  - Assess and monitor skin integrity  - Assess and monitor nutrition and hydration status  - Monitor labs   - Assess for incontinence   - Turn and reposition patient  - Assist with mobility/ambulation  - Relieve pressure over bony prominences  - Avoid friction and shearing  - Provide appropriate hygiene as needed including keeping skin clean and dry  - Evaluate need for skin moisturizer/barrier cream  - Collaborate with interdisciplinary team   - Patient/family teaching  - Consider wound care consult   Outcome: Progressing

## 2023-08-14 NOTE — ASSESSMENT & PLAN NOTE
· Initially held metoprolol and Demadex  · Blood pressure improved, metoprolol resumed  · Patient appeared volume overloaded with +4 pitting edema bilateral lower extremities, received one-time dose of Lasix 20 mg IV on 8/12  · Continue to hold Demadex as patient continues to have episodes of hypotension overnight s/p albumin  · Continue to monitor

## 2023-08-14 NOTE — ASSESSMENT & PLAN NOTE
Patient reports feeling unwell for 2 days with intermittent shortness of breath. Morning of admission, when she woke up her heart was racing prompting her to come to the emergency department. On arrival to the ER she was found to have fever of 103. She meets sepsis criteria with fever, tachycardia, leukocytosis. She had a chest x-ray which revealed a right base infiltrate. Flu and COVID-negative  · Started on Rocephin, and azithromycin; urine antigens negative, d/c azithro  · Procalcitonin 0.08 > 0.23 > 0.42  · Blood cultures negative to date  · Urine culture growing contaminant  · Patient spiking fevers overnight 8/13 with tmax 103.7  · abx broadened to cefepime  · Repeat blood cultures pending  · CT chest: No definite pneumonia. Mild septal thickening suggestive of interstitial edema. Dependent atelectasis in the lower lobes.   · Continue cefepime for now and continue to monitor  · Patient clinically stable and without worsening or new symptoms

## 2023-08-14 NOTE — PLAN OF CARE
Problem: RESPIRATORY - ADULT  Goal: Achieves optimal ventilation and oxygenation  Description: INTERVENTIONS:  - Assess for changes in respiratory status  - Assess for changes in mentation and behavior  - Position to facilitate oxygenation and minimize respiratory effort  - Oxygen administered by appropriate delivery if ordered  - Initiate smoking cessation education as indicated  - Encourage broncho-pulmonary hygiene including cough, deep breathe, Incentive Spirometry  - Assess the need for suctioning and aspirate as needed  - Assess and instruct to report SOB or any respiratory difficulty  - Respiratory Therapy support as indicated  Outcome: Progressing     Problem: INFECTION - ADULT  Goal: Absence or prevention of progression during hospitalization  Description: INTERVENTIONS:  - Assess and monitor for signs and symptoms of infection  - Monitor lab/diagnostic results  - Monitor all insertion sites, i.e. indwelling lines, tubes, and drains  - Monitor endotracheal if appropriate and nasal secretions for changes in amount and color  - [place] appropriate cooling/warming therapies per order  - Administer medications as ordered  - Instruct and encourage patient and family to use good hand hygiene technique  - Identify and instruct in appropriate isolation precautions for identified infection/condition  Outcome: Progressing  Goal: Absence of fever/infection during neutropenic period  Description: INTERVENTIONS:  - Monitor WBC    Outcome: Progressing     Problem: SAFETY ADULT  Goal: Patient will remain free of falls  Description: INTERVENTIONS:  - Educate patient/family on patient safety including physical limitations  - Instruct patient to call for assistance with activity   - Consult OT/PT to assist with strengthening/mobility   - Keep Call bell within reach  - Keep bed low and locked with side rails adjusted as appropriate  - Keep care items and personal belongings within reach  - Initiate and maintain comfort rounds  - Make Fall Risk Sign visible to staff  - Offer Toileting every 2 Hours, in advance of need  - Initiate/Maintain bed alarm  - Obtain necessary fall risk management equipment: call bell   - Apply yellow socks and bracelet for high fall risk patients  - Consider moving patient to room near nurses station  Outcome: Progressing  Goal: Maintain or return to baseline ADL function  Description: INTERVENTIONS:  -  Assess patient's ability to carry out ADLs; assess patient's baseline for ADL function and identify physical deficits which impact ability to perform ADLs (bathing, care of mouth/teeth, toileting, grooming, dressing, etc.)  - Assess/evaluate cause of self-care deficits   - Assess range of motion  - Assess patient's mobility; develop plan if impaired  - Assess patient's need for assistive devices and provide as appropriate  - Encourage maximum independence but intervene and supervise when necessary  - Involve family in performance of ADLs  - Assess for home care needs following discharge   - Consider OT consult to assist with ADL evaluation and planning for discharge  - Provide patient education as appropriate  Outcome: Progressing  Goal: Maintains/Returns to pre admission functional level  Description: INTERVENTIONS:  - Perform BMAT or MOVE assessment daily.   - Set and communicate daily mobility goal to care team and patient/family/caregiver. - Collaborate with rehabilitation services on mobility goals if consulted  - Perform Range of Motion 3 times a day. - Reposition patient every 3 hours.   - Dangle patient 3 times a day  - Stand patient 3 times a day  - Ambulate patient 3 times a day  - Out of bed to chair 3 times a day   - Out of bed for meals 3 times a day  - Out of bed for toileting  - Record patient progress and toleration of activity level   Outcome: Progressing     Problem: DISCHARGE PLANNING  Goal: Discharge to home or other facility with appropriate resources  Description: INTERVENTIONS:  - Identify barriers to discharge w/patient and caregiver  - Arrange for needed discharge resources and transportation as appropriate  - Identify discharge learning needs (meds, wound care, etc.)  - Arrange for interpretive services to assist at discharge as needed  - Refer to Case Management Department for coordinating discharge planning if the patient needs post-hospital services based on physician/advanced practitioner order or complex needs related to functional status, cognitive ability, or social support system  Outcome: Progressing     Problem: Knowledge Deficit  Goal: Patient/family/caregiver demonstrates understanding of disease process, treatment plan, medications, and discharge instructions  Description: Complete learning assessment and assess knowledge base.   Interventions:  - Provide teaching at level of understanding  - Provide teaching via preferred learning methods  Outcome: Progressing     Problem: CARDIOVASCULAR - ADULT  Goal: Maintains optimal cardiac output and hemodynamic stability  Description: INTERVENTIONS:  - Monitor I/O, vital signs and rhythm  - Monitor for S/S and trends of decreased cardiac output  - Administer and titrate ordered vasoactive medications to optimize hemodynamic stability  - Assess quality of pulses, skin color and temperature  - Assess for signs of decreased coronary artery perfusion  - Instruct patient to report change in severity of symptoms  Outcome: Progressing  Goal: Absence of cardiac dysrhythmias or at baseline rhythm  Description: INTERVENTIONS:  - Continuous cardiac monitoring, vital signs, obtain 12 lead EKG if ordered  - Administer antiarrhythmic and heart rate control medications as ordered  - Monitor electrolytes and administer replacement therapy as ordered  Outcome: Progressing     Problem: METABOLIC, FLUID AND ELECTROLYTES - ADULT  Goal: Electrolytes maintained within normal limits  Description: INTERVENTIONS:  - Monitor labs and assess patient for signs and symptoms of electrolyte imbalances  - Administer electrolyte replacement as ordered  - Monitor response to electrolyte replacements, including repeat lab results as appropriate  - Instruct patient on fluid and nutrition as appropriate  Outcome: Progressing  Goal: Fluid balance maintained  Description: INTERVENTIONS:  - Monitor labs   - Monitor I/O and WT  - Instruct patient on fluid and nutrition as appropriate  - Assess for signs & symptoms of volume excess or deficit  Outcome: Progressing  Goal: Glucose maintained within target range  Description: INTERVENTIONS:  - Monitor Blood Glucose as ordered  - Assess for signs and symptoms of hyperglycemia and hypoglycemia  - Administer ordered medications to maintain glucose within target range  - Assess nutritional intake and initiate nutrition service referral as needed  Outcome: Progressing     Problem: SKIN/TISSUE INTEGRITY - ADULT  Goal: Skin Integrity remains intact(Skin Breakdown Prevention)  Description: Assess:  -Perform Hung assessment every shift   -Clean and moisturize skin every shift   -Inspect skin when repositioning, toileting, and assisting with ADLS  -Assess under medical devices every shift   -Assess extremities for adequate circulation and sensation     Bed Management:  -Have minimal linens on bed & keep smooth, unwrinkled  -Change linens as needed when moist or perspiring  -Avoid sitting or lying in one position for more than 1 hours while in bed  -Keep HOB at 30degrees     Toileting:  -Offer bedside commode  -Assess for incontinence every shift   -Use incontinent care products after each incontinent episode such as ***    Activity:  -Mobilize patient *** times a day  -Encourage activity and walks on unit  -Encourage or provide ROM exercises   -Turn and reposition patient every *** Hours  -Use appropriate equipment to lift or move patient in bed  -Instruct/ Assist with weight shifting every *** when out of bed in chair  -Consider limitation of chair time *** hour intervals    Skin Care:  -Avoid use of baby powder, tape, friction and shearing, hot water or constrictive clothing  -Relieve pressure over bony prominences using ***  -Do not massage red bony areas    Next Steps:  -Teach patient strategies to minimize risks such as ***   -Consider consults to  interdisciplinary teams such as ***  Outcome: Progressing  Goal: Incision(s), wounds(s) or drain site(s) healing without S/S of infection  Description: INTERVENTIONS  - Assess and document dressing, incision, wound bed, drain sites and surrounding tissue  - Provide patient and family education  - Perform skin care/dressing changes every ***  Outcome: Progressing  Goal: Pressure injury heals and does not worsen  Description: Interventions:  - Implement low air loss mattress or specialty surface (Criteria met)  - Apply silicone foam dressing  - Instruct/assist with weight shifting every *** minutes when in chair   - Limit chair time to *** hour intervals  - Use special pressure reducing interventions such as *** when in chair   - Apply fecal or urinary incontinence containment device   - Perform passive or active ROM every ***  - Turn and reposition patient & offload bony prominences every *** hours   - Utilize friction reducing device or surface for transfers   - Consider consults to  interdisciplinary teams such as ***  - Use incontinent care products after each incontinent episode such as ***  - Consider nutrition services referral as needed  Outcome: Progressing     Problem: MOBILITY - ADULT  Goal: Maintain or return to baseline ADL function  Description: INTERVENTIONS:  -  Assess patient's ability to carry out ADLs; assess patient's baseline for ADL function and identify physical deficits which impact ability to perform ADLs (bathing, care of mouth/teeth, toileting, grooming, dressing, etc.)  - Assess/evaluate cause of self-care deficits   - Assess range of motion  - Assess patient's mobility; develop plan if impaired  - Assess patient's need for assistive devices and provide as appropriate  - Encourage maximum independence but intervene and supervise when necessary  - Involve family in performance of ADLs  - Assess for home care needs following discharge   - Consider OT consult to assist with ADL evaluation and planning for discharge  - Provide patient education as appropriate  Outcome: Progressing  Goal: Maintains/Returns to pre admission functional level  Description: INTERVENTIONS:  - Perform BMAT or MOVE assessment daily.   - Set and communicate daily mobility goal to care team and patient/family/caregiver. - Collaborate with rehabilitation services on mobility goals if consulted  - Perform Range of Motion *** times a day. - Reposition patient every *** hours.   - Dangle patient *** times a day  - Stand patient *** times a day  - Ambulate patient *** times a day  - Out of bed to chair *** times a day   - Out of bed for meals *** times a day  - Out of bed for toileting  - Record patient progress and toleration of activity level   Outcome: Progressing     Problem: Prexisting or High Potential for Compromised Skin Integrity  Goal: Skin integrity is maintained or improved  Description: INTERVENTIONS:  - Identify patients at risk for skin breakdown  - Assess and monitor skin integrity  - Assess and monitor nutrition and hydration status  - Monitor labs   - Assess for incontinence   - Turn and reposition patient  - Assist with mobility/ambulation  - Relieve pressure over bony prominences  - Avoid friction and shearing  - Provide appropriate hygiene as needed including keeping skin clean and dry  - Evaluate need for skin moisturizer/barrier cream  - Collaborate with interdisciplinary team   - Patient/family teaching  - Consider wound care consult   Outcome: Progressing

## 2023-08-14 NOTE — ASSESSMENT & PLAN NOTE
· Patient had breast mass removed recently and is in the process of seeing radiation oncology  · Radiation was to start 8/16 at [facility]  · Outpatient follow-up

## 2023-08-14 NOTE — ASSESSMENT & PLAN NOTE
Lab Results   Component Value Date    EGFR 17 08/14/2023    EGFR 24 08/13/2023    EGFR 27 08/12/2023    CREATININE 2.69 (H) 08/14/2023    CREATININE 2.01 (H) 08/13/2023    CREATININE 1.82 (H) 08/12/2023   · Creatinine elevated today at 2.69  · Bladder scan 150 mL  · Patient has had periods of hypotension  · Hold parameters for medications for SBP less than 110  · Patient had dose of IV lasix on 8/12  · Hold further diuretics for now  · Check BNP in am  · Repeat BMP in a.m.   · Consider nephrology consultation if no improvement

## 2023-08-14 NOTE — ASSESSMENT & PLAN NOTE
· Patient wears oxygen at home at night with her CPAP but is not oxygen dependent during the day  · Keep O2 sats greater than 92%  · Currently stable on 2 L

## 2023-08-14 NOTE — PROGRESS NOTES
[address]  Progress Note  Name: Merrick Barthel  MRN: [id number]  Unit/Bed#: 4 [place] 401-01 I Date of Admission: 8/11/2023   Date of Service: 8/14/2023 I Hospital Day: 3    Assessment/Plan   * Sepsis due to pneumonia [facility])  Assessment & Plan  Patient reports feeling unwell for 2 days with intermittent shortness of breath. Morning of admission, when she woke up her heart was racing prompting her to come to the emergency department. On arrival to the ER she was found to have fever of 103. She meets sepsis criteria with fever, tachycardia, leukocytosis. She had a chest x-ray which revealed a right base infiltrate. Flu and COVID-negative  · Started on Rocephin, and azithromycin; urine antigens negative, d/c azithro  · Procalcitonin 0.08 > 0.23 > 0.42  · Blood cultures negative to date  · Urine culture growing contaminant  · Patient spiking fevers overnight 8/13 with tmax 103.7  · abx broadened to cefepime  · Repeat blood cultures pending  · CT chest: No definite pneumonia. Mild septal thickening suggestive of interstitial edema. Dependent atelectasis in the lower lobes.   · Continue cefepime for now and continue to monitor  · Patient clinically stable and without worsening or new symptoms    Essential hypertension  Assessment & Plan  · Initially held metoprolol and Demadex  · Blood pressure improved, metoprolol resumed  · Patient appeared volume overloaded with +4 pitting edema bilateral lower extremities, received one-time dose of Lasix 20 mg IV on 8/12  · Continue to hold Demadex as patient continues to have episodes of hypotension overnight s/p albumin  · Continue to monitor    Acute kidney injury superimposed on chronic kidney disease [facility])  Assessment & Plan  Lab Results   Component Value Date    EGFR 17 08/14/2023    EGFR 24 08/13/2023    EGFR 27 08/12/2023    CREATININE 2.69 (H) 08/14/2023    CREATININE 2.01 (H) 08/13/2023    CREATININE 1.82 (H) 08/12/2023   · Creatinine elevated today at 2.69  · Bladder scan 150 mL  · Patient has had periods of hypotension  · Hold parameters for medications for SBP less than 110  · Patient had dose of IV lasix on 8/12  · Hold further diuretics for now  · Check BNP in am  · Repeat BMP in a.m. · Consider nephrology consultation if no improvement    Ductal carcinoma in situ (DCIS) of right breast  Assessment & Plan  · Patient had breast mass removed recently and is in the process of seeing radiation oncology  · Radiation was to start 8/16 at [facility]  · Outpatient follow-up    Type 2 diabetes mellitus with hyperglycemia, without long-term current use of insulin [facility])  Assessment & Plan  Lab Results   Component Value Date    HGBA1C 9.3 (H) 08/13/2023       Recent Labs     08/14/23  0138 08/14/23  0705 08/14/23  1111 08/14/23  1557   POCGLU 180* 154* 230* 165*       Blood Sugar Average: Last 72 hrs:  (P) 167   · Continue 25 units of long-acting insulin at bedtime, 10 units of Humalog 3 times daily with meals and insulin sliding scale    Chronic respiratory failure with hypoxia and hypercapnia (HCC)  Assessment & Plan  · Patient wears oxygen at home at night with her CPAP but is not oxygen dependent during the day  · Keep O2 sats greater than 92%  · Currently stable on 2 L    KATRINA (obstructive sleep apnea)  Assessment & Plan  · Continue CPAP hs    Paroxysmal atrial fibrillation (HCC)  Assessment & Plan  · Continue Eliquis  · Continue metoprolol    Morbid obesity with BMI of 45.0-49.9, adult (HCC)  Assessment & Plan  · Dietary lifestyle modifications           VTE Pharmacologic Prophylaxis: VTE Score: 5 High Risk (Score >/= 5) - Pharmacological DVT Prophylaxis Ordered: apixaban (Eliquis). Sequential Compression Devices Ordered. Patient Centered Rounds: I performed bedside rounds with nursing staff today. Discussions with Specialists or Other Care Team Provider: rn, kenyon    Education and Discussions with Family / Patient: Patient declined call to . Patient reports her niece is at work and she will call her later. Total Time Spent on Date of Encounter in care of patient: 45 minutes This time was spent on one or more of the following: performing physical exam; counseling and coordination of care; obtaining or reviewing history; documenting in the medical record; reviewing/ordering tests, medications or procedures; communicating with other healthcare professionals and discussing with patient's family/caregivers. Current Length of Stay: 3 day(s)  Current Patient Status: Inpatient   Certification Statement: The patient will continue to require additional inpatient hospital stay due to fever, IV abx, SEBASTIAN  Discharge Plan: Anticipate discharge in 48-72 hrs to rehab facility. Code Status: Level 1 - Full Code    Subjective:   Patient seen and examined at bedside this morning with wound care nurse present. Patient reports she is feeling fine and does not understand why she is having such high fevers. She does report that when her temperatures at home she feels "flushed" but overall she feels better than when she came in. She does still have some shortness of breath when she has to move around a lot but no shortness of breath at rest.  She denies fever, chills, sweats, headache, cough, chest pain, nausea, vomiting, abdominal pain, urinary symptoms, or diarrhea. Objective:     Vitals:   Temp (24hrs), Av.2 °F (37.3 °C), Min:97.3 °F (36.3 °C), Max:103.7 °F (39.8 °C)    Temp:  [97.3 °F (36.3 °C)-103.7 °F (39.8 °C)] 98.2 °F (36.8 °C)  HR:  [] 66  Resp:  [18-20] 20  BP: ()/(43-62) 127/58  SpO2:  [93 %-100 %] 100 %  Body mass index is 48.65 kg/m². Input and Output Summary (last 24 hours): Intake/Output Summary (Last 24 hours) at 2023 3050  Last data filed at 2023 1301  Gross per 24 hour   Intake 770 ml   Output 1300 ml   Net -530 ml       Physical Exam:   Physical Exam  Vitals and nursing note reviewed.    Constitutional: General: She is not in acute distress. Appearance: She is well-developed. She is obese. Comments: 98% on 2 L   Cardiovascular:      Rate and Rhythm: Normal rate and regular rhythm. Heart sounds: No murmur heard. Pulmonary:      Effort: Pulmonary effort is normal. No respiratory distress. Breath sounds: Decreased breath sounds present. Abdominal:      General: Bowel sounds are normal.      Palpations: Abdomen is soft. Tenderness: There is no abdominal tenderness. Musculoskeletal:         General: No swelling. Cervical back: Neck supple. Right lower leg: Edema present. Left lower leg: Edema present. Skin:     General: Skin is warm and dry. Capillary Refill: Capillary refill takes less than 2 seconds. Neurological:      Mental Status: She is alert. Psychiatric:         Mood and Affect: Mood normal.         Additional Data:     Labs:  Results from last 7 days   Lab Units 08/14/23  0559 08/12/23  1158 08/11/23  1236   WBC Thousand/uL 14.02*   < > 16.88*   HEMOGLOBIN g/dL 9.6*   < > 11.9   HEMATOCRIT % 31.8*   < > 37.6   PLATELETS Thousands/uL 142*   < > 171   NEUTROS PCT %  --   --  83*   LYMPHS PCT %  --   --  8*   MONOS PCT %  --   --  7   EOS PCT %  --   --  1    < > = values in this interval not displayed. Results from last 7 days   Lab Units 08/14/23  0559 08/12/23  0512 08/11/23  1236   SODIUM mmol/L 138   < > 137   POTASSIUM mmol/L 4.5   < > 4.5   CHLORIDE mmol/L 101   < > 97   CO2 mmol/L 27   < > 33*   BUN mg/dL 47*   < > 35*   CREATININE mg/dL 2.69*   < > 1.64*   ANION GAP mmol/L 10   < > 7   CALCIUM mg/dL 8.2*   < > 9.0   ALBUMIN g/dL  --   --  3.7   TOTAL BILIRUBIN mg/dL  --   --  0.56   ALK PHOS U/L  --   --  54   ALT U/L  --   --  12   AST U/L  --   --  15   GLUCOSE RANDOM mg/dL 142*   < > 190*    < > = values in this interval not displayed.      Results from last 7 days   Lab Units 08/11/23  1236   INR  1.28*     Results from last 7 days   Lab Units 08/14/23  1557 08/14/23  1111 08/14/23  0705 08/14/23  0138 08/13/23  2038 08/13/23  1553 08/13/23  1112 08/13/23  0716 08/13/23  0143 08/12/23  2059 08/12/23  1634 08/12/23  1200   POC GLUCOSE mg/dl 165* 230* 154* 180* 235* 161* 128 116 134 153* 210* 164*     Results from last 7 days   Lab Units 08/13/23  0446   HEMOGLOBIN A1C % 9.3*     Results from last 7 days   Lab Units 08/14/23  0559 08/13/23  1956 08/12/23  0512 08/11/23  1236   LACTIC ACID mmol/L  --  0.6  --  1.2   PROCALCITONIN ng/ml 0.42*  --  0.23 0.08       Lines/Drains:  Invasive Devices     Peripheral Intravenous Line  Duration           Peripheral IV 08/11/23 Distal;Right;Upper;Ventral (anterior) Arm 3 days    Peripheral IV 08/11/23 Dorsal (posterior); Right Forearm 3 days          Drain  Duration           External Urinary Catheter 1 day    External Urinary Catheter <1 day                      Imaging: Reviewed radiology reports from this admission including: chest xray and chest CT scan    Recent Cultures (last 7 days):   Results from last 7 days   Lab Units 08/13/23  1956 08/12/23  0557 08/11/23  1308 08/11/23  1236   BLOOD CULTURE  Received in Microbiology Lab. Culture in Progress. Received in Microbiology Lab. Culture in Progress. --   --  No Growth at 48 hrs. No Growth at 48 hrs.    URINE CULTURE   --   --  >100,000 cfu/ml Gardnerella vaginalis*  <10,000 cfu/ml  --    LEGIONELLA URINARY ANTIGEN   --  Negative  --   --        Last 24 Hours Medication List:   Current Facility-Administered Medications   Medication Dose Route Frequency Provider Last Rate   • acetaminophen  650 mg Oral Q6H PRN RK Hinton     • ammonium lactate  1 Application Topical BID PRN RK Leggett     • apixaban  5 mg Oral BID RK Hinton     • atorvastatin  20 mg Oral Daily With Dinner RK Hinton     • cefepime  2 g Intravenous Q12H Cristal Pennington DO 2,000 mg (08/14/23 1754)   • fluticasone  1 spray Nasal Daily Chari Fresh, CRNP     • Fluticasone Furoate-Vilanterol  1 puff Inhalation Daily Chari Fresh, CRNP     • insulin glargine  25 Units Subcutaneous HS Chari Fresh, CRNP     • insulin lispro  10 Units Subcutaneous TID With Meals Chari Fresh, CRNP     • insulin lispro  2-12 Units Subcutaneous TID AC Chari Fresh, CRNP     • insulin lispro  2-12 Units Subcutaneous HS Chari Fresh, CRNP     • insulin lispro  2-12 Units Subcutaneous 0200 Chari Fresh, MARLENENP     • metoprolol succinate  50 mg Oral BID Orbbarb GeRK banks     • montelukast  10 mg Oral HS Chari Fresh, CRNP     • nystatin   Topical BID OrbRK James     • ondansetron  4 mg Intravenous Q6H PRN Cristal Pennington DO     • pantoprazole  40 mg Oral Daily Chari Fresh, RK     • pregabalin  100 mg Oral BID Chari Fresh, MARLENENP     • saccharomyces boulardii  250 mg Oral BID OrbRK James          Today, Patient Was Seen By: Michoacano Carrasquillo PA-C    **Please Note: This note may have been constructed using a voice recognition system. **

## 2023-08-14 NOTE — CASE MANAGEMENT
Case Management Assessment & Discharge Planning Note    Patient name Hazel Walker  Location [address] [facility] 401/4 [facility] 12-* MRN [id number]  : 1950 Date 2023       Current Admission Date: 2023  Current Admission Diagnosis:Sepsis due to pneumonia [facility])   Patient Active Problem List    Diagnosis Date Noted   • Ductal carcinoma in situ (DCIS) of right breast 2023   • Intraductal papilloma of right breast 05/15/2023   • Abnormal mammogram 2023   • Allergies 2023   • Fall 2023   • Sepsis due to pneumonia ([address]) 2023   • Syncope 2023   • Type 2 diabetes mellitus with hyperglycemia, without long-term current use of insulin ([address]) 2023   • Impaired mobility and ADLs 2022   • Pressure injury of sacral region, stage 2 ([address]) 2022   • Rash 2022   • PSVT (paroxysmal supraventricular tachycardia) ([address]) 2022   • Chronic respiratory failure with hypoxia and hypercapnia ([address]) 2022   • Platelets decreased ([address]) 2022   • KATRINA (obstructive sleep apnea) 2022   • Obesity hypoventilation syndrome ([address]) 2022   • Mass overlapping multiple quadrants of right breast 2022   • Pressure injury of deep tissue of sacral region 2022   • Multiple pulmonary nodules determined by computed tomography of lung 2022   • Chronic diastolic heart failure ([address]) 2022   • Peripheral venous insufficiency 2022   • Raynaud's disease 2022   • Lung nodule 2022   • Stage 3 chronic kidney disease ([address])    • Status post reverse total replacement of left shoulder 2022   • PONV (postoperative nausea and vomiting) 2022   • Diabetes mellitus, type 2 ([address]) 2022   • Gastroesophageal reflux disease 2022   • Nephrolithiasis 2022   • Arthritis 2022   • S/P total knee replacement, right 2022   • On continuous oral anticoagulation - eliquis 2022   • Essential hypertension 2019   • Anemia 07/31/2019   • Mixed hyperlipidemia 06/25/2019   • Paroxysmal atrial fibrillation ([address] [facility]) 05/28/2019   • Lower leg edema 05/27/2019   • Moderate persistent asthma without complication 43/04/0157   • Morbid obesity with BMI of 45.0-49.9, adult ([address] [facility]) 03/08/2018      LOS (days): 3  Geometric Mean LOS (GMLOS) (days): 5.00  Days to GMLOS:2     OBJECTIVE:    Risk of Unplanned Readmission Score: 33.79       Current admission status: Inpatient  Referral Reason: Other (Discharge planning)    Preferred Pharmacy:   [facility], [address] [place] [address] ([address])  [address] ([address])  [address] [facility] [address]  Phone: [phone] Fax: [phone]    Primary Care Provider: Morgan Jamison MD    Primary Insurance: MEDICARE  Secondary Insurance: [employer]    ASSESSMENT:  Active Health Care Proxies     [address], [address] Representative - Karen   Primary Phone: [phone] (Mobile)                Readmission Root Cause  30 Day Readmission: No    Patient Information  Admitted from[de-identified] Home  Mental Status: Alert  During Assessment patient was accompanied by:  Other-Comment (Niyen Augusta Alpers and [facility])  Assessment information provided by[de-identified] Patient  Primary Caregiver: Family  Caregiver's Name[de-identified] Booker El Relationship to Patient[de-identified] Family Member  Caregiver's Telephone Number[de-identified] [phone]  Support Systems: Family members  [place] of Residence: [address] [place] [address] do you live in?: [place]  Type of Current Residence: [facility]  In the last 12 months, was there a time when you were not able to pay the mortgage or rent on time?: No  In the last 12 months, how many places have you lived?: 1  In the last 12 months, was there a time when you did not have a steady place to sleep or slept in a shelter (including now)?: No  Homeless/housing insecurity resource given?: N/A  Living Arrangements: Lives w/ Extended Family    Activities of Daily Living Prior to Admission  Functional Status: Assistance  Completes ADLs independently?: No  Level of ADL dependence: Assistance  Ambulates independently?: Yes  Does patient use assisted devices?: Yes  Assisted Devices (DME) used: Shower Chair, Walker, Straight [employer], Home Oxygen concentrator, CPAP, Wheelchair, Hospital Bed  DME Company Name (respiratory supplies): [employer]  Does patient currently own DME?: Yes  What DME does the patient currently own?: CPAP, Home Oxygen concentrator, Hospital Bed, Shower Chair, Straight [employer] [place], Wheelchair  Does the patient have a history of Short-Term Rehab?: Yes ([facility], [facility])  Does patient have a history of [facility]?: Yes ([facility])     Patient Information Continued  Income Source: Pension/detention  Does patient have prescription coverage?: Yes  Within the past 12 months, you worried that your food would run out before you got the money to buy more.: Never true  Within the past 12 months, the food you bought just didn't last and you didn't have money to get more.: Never true  Food insecurity resource given?: N/A  Does patient receive dialysis treatments?: No  Does patient have a history of substance abuse?: No  Does patient have a history of Mental Health Diagnosis?: No     Means of Transportation  Means of Transport to Appts[de-identified] Family transport  In the past 12 months, has lack of transportation kept you from medical appointments or from getting medications?: No  In the past 12 months, has lack of transportation kept you from meetings, work, or from getting things needed for daily living?: No  Was application for public transport provided?: N/A      DISCHARGE DETAILS:    Discharge planning discussed with[de-identified] Patient and family  Freedom of Choice: Yes     Comments - Freedom of Choice: SW spoke with patient at bedside to introduce role of CM, conduct assessment and discuss discharge planning. Nieces Nathan García and TERRY were at bedside.   STR is recommended and patient and family is in agreement. Patient requested that [facility] not be included in the referral and she verbalized preference for [facility]. SW placed referrals in [address] and will continue to follow. CM contacted family/caregiver?: Yes  Were Treatment Team discharge recommendations reviewed with patient/caregiver?: Yes  Did patient/caregiver verbalize understanding of patient care needs?: N/A- going to facility  Were patient/caregiver advised of the risks associated with not following Treatment Team discharge recommendations?: Yes    Contacts  Patient Contacts: Maximo Mejia and Moon Christina (nieces)  Relationship to Patient[de-identified] Family  Contact Method:  In Person  Reason/Outcome: Discharge [address]         Is the patient interested in [facility] at discharge?: No    DME Referral Provided  Referral made for DME?: No    Other Referral/Resources/Interventions Provided:  Interventions: Short Term Rehab    Would you like to participate in our [address] service program?  : No - Declined    Treatment Team Recommendation: Short Term Rehab  Discharge Destination Plan[de-identified] Short Term Rehab  Transport at Discharge : BLS Ambulance         IMM Given (Date):: 08/11/23

## 2023-08-15 ENCOUNTER — APPOINTMENT (INPATIENT)
Dept: RADIOLOGY | Facility: HOSPITAL | Age: 73
DRG: 853 | End: 2023-08-15
Payer: MEDICARE

## 2023-08-15 ENCOUNTER — APPOINTMENT (INPATIENT)
Dept: NON INVASIVE DIAGNOSTICS | Facility: HOSPITAL | Age: 73
DRG: 853 | End: 2023-08-15
Payer: MEDICARE

## 2023-08-15 LAB
ANION GAP SERPL CALCULATED.3IONS-SCNC: 6 MMOL/L
AORTIC ROOT: 3.2 CM
BASOPHILS # BLD AUTO: 0.06 THOUSANDS/ÂΜL (ref 0–0.1)
BASOPHILS NFR BLD AUTO: 1 % (ref 0–1)
BNP SERPL-MCNC: 213 PG/ML (ref 0–100)
BUN SERPL-MCNC: 58 MG/DL (ref 5–25)
CALCIUM SERPL-MCNC: 7.9 MG/DL (ref 8.4–10.2)
CHLORIDE SERPL-SCNC: 102 MMOL/L (ref 96–108)
CO2 SERPL-SCNC: 28 MMOL/L (ref 21–32)
CREAT SERPL-MCNC: 2.91 MG/DL (ref 0.6–1.3)
E WAVE DECELERATION TIME: 149 MS
EOSINOPHIL # BLD AUTO: 0.91 THOUSAND/ÂΜL (ref 0–0.61)
EOSINOPHIL NFR BLD AUTO: 8 % (ref 0–6)
ERYTHROCYTE [DISTWIDTH] IN BLOOD BY AUTOMATED COUNT: 15.4 % (ref 11.6–15.1)
FRACTIONAL SHORTENING: 33 % (ref 28–44)
GFR SERPL CREATININE-BSD FRML MDRD: 15 ML/MIN/1.73SQ M
GLUCOSE SERPL-MCNC: 149 MG/DL (ref 65–140)
GLUCOSE SERPL-MCNC: 158 MG/DL (ref 65–140)
GLUCOSE SERPL-MCNC: 162 MG/DL (ref 65–140)
GLUCOSE SERPL-MCNC: 200 MG/DL (ref 65–140)
GLUCOSE SERPL-MCNC: 208 MG/DL (ref 65–140)
GLUCOSE SERPL-MCNC: 235 MG/DL (ref 65–140)
HCT VFR BLD AUTO: 30.2 % (ref 34.8–46.1)
HGB BLD-MCNC: 9.1 G/DL (ref 11.5–15.4)
IMM GRANULOCYTES # BLD AUTO: 0.09 THOUSAND/UL (ref 0–0.2)
IMM GRANULOCYTES NFR BLD AUTO: 1 % (ref 0–2)
INTERVENTRICULAR SEPTUM IN DIASTOLE (PARASTERNAL SHORT AXIS VIEW): 0.9 CM
INTERVENTRICULAR SEPTUM: 0.9 CM (ref 0.6–1.1)
LEFT ATRIUM SIZE: 3.6 CM
LEFT INTERNAL DIMENSION IN SYSTOLE: 3.2 CM (ref 2.1–4)
LEFT VENTRICULAR INTERNAL DIMENSION IN DIASTOLE: 4.8 CM (ref 3.5–6)
LEFT VENTRICULAR POSTERIOR WALL IN END DIASTOLE: 0.8 CM
LEFT VENTRICULAR STROKE VOLUME: 65 ML
LVSV (TEICH): 65 ML
LYMPHOCYTES # BLD AUTO: 1.42 THOUSANDS/ÂΜL (ref 0.6–4.47)
LYMPHOCYTES NFR BLD AUTO: 12 % (ref 14–44)
MCH RBC QN AUTO: 28.3 PG (ref 26.8–34.3)
MCHC RBC AUTO-ENTMCNC: 30.1 G/DL (ref 31.4–37.4)
MCV RBC AUTO: 94 FL (ref 82–98)
MONOCYTES # BLD AUTO: 1.21 THOUSAND/ÂΜL (ref 0.17–1.22)
MONOCYTES NFR BLD AUTO: 10 % (ref 4–12)
MV E'TISSUE VEL-LAT: 10 CM/S
MV E'TISSUE VEL-SEP: 9 CM/S
MV PEAK A VEL: 0.63 M/S
MV PEAK E VEL: 97 CM/S
MV STENOSIS PRESSURE HALF TIME: 43 MS
MV VALVE AREA P 1/2 METHOD: 5.12 CM2
NEUTROPHILS # BLD AUTO: 7.92 THOUSANDS/ÂΜL (ref 1.85–7.62)
NEUTS SEG NFR BLD AUTO: 68 % (ref 43–75)
NRBC BLD AUTO-RTO: 0 /100 WBCS
PLATELET # BLD AUTO: 143 THOUSANDS/UL (ref 149–390)
PMV BLD AUTO: 10.9 FL (ref 8.9–12.7)
POTASSIUM SERPL-SCNC: 4.6 MMOL/L (ref 3.5–5.3)
PROCALCITONIN SERPL-MCNC: 0.36 NG/ML
RBC # BLD AUTO: 3.21 MILLION/UL (ref 3.81–5.12)
SL CV LV EF: 55
SL CV PED ECHO LEFT VENTRICLE DIASTOLIC VOLUME (MOD BIPLANE) 2D: 105 ML
SL CV PED ECHO LEFT VENTRICLE SYSTOLIC VOLUME (MOD BIPLANE) 2D: 40 ML
SODIUM SERPL-SCNC: 136 MMOL/L (ref 135–147)
TR MAX PG: 16 MMHG
TR PEAK VELOCITY: 2 M/S
TRICUSPID VALVE PEAK REGURGITATION VELOCITY: 2.03 M/S
WBC # BLD AUTO: 11.61 THOUSAND/UL (ref 4.31–10.16)

## 2023-08-15 PROCEDURE — 83880 ASSAY OF NATRIURETIC PEPTIDE: CPT

## 2023-08-15 PROCEDURE — 99232 SBSQ HOSP IP/OBS MODERATE 35: CPT

## 2023-08-15 PROCEDURE — 85025 COMPLETE CBC W/AUTO DIFF WBC: CPT

## 2023-08-15 PROCEDURE — 94760 N-INVAS EAR/PLS OXIMETRY 1: CPT

## 2023-08-15 PROCEDURE — 82948 REAGENT STRIP/BLOOD GLUCOSE: CPT

## 2023-08-15 PROCEDURE — 93306 TTE W/DOPPLER COMPLETE: CPT | Performed by: INTERNAL MEDICINE

## 2023-08-15 PROCEDURE — 76775 US EXAM ABDO BACK WALL LIM: CPT

## 2023-08-15 PROCEDURE — 93005 ELECTROCARDIOGRAM TRACING: CPT

## 2023-08-15 PROCEDURE — C8929 TTE W OR WO FOL WCON,DOPPLER: HCPCS

## 2023-08-15 PROCEDURE — 80048 BASIC METABOLIC PNL TOTAL CA: CPT

## 2023-08-15 PROCEDURE — 99223 1ST HOSP IP/OBS HIGH 75: CPT | Performed by: INTERNAL MEDICINE

## 2023-08-15 PROCEDURE — 84145 PROCALCITONIN (PCT): CPT

## 2023-08-15 RX ORDER — FUROSEMIDE 10 MG/ML
40 INJECTION INTRAMUSCULAR; INTRAVENOUS
Status: COMPLETED | OUTPATIENT
Start: 2023-08-15 | End: 2023-08-15

## 2023-08-15 RX ADMIN — APIXABAN 5 MG: 5 TABLET, FILM COATED ORAL at 17:01

## 2023-08-15 RX ADMIN — INSULIN GLARGINE 25 UNITS: 100 INJECTION, SOLUTION SUBCUTANEOUS at 21:10

## 2023-08-15 RX ADMIN — INSULIN LISPRO 2 UNITS: 100 INJECTION, SOLUTION INTRAVENOUS; SUBCUTANEOUS at 07:58

## 2023-08-15 RX ADMIN — APIXABAN 5 MG: 5 TABLET, FILM COATED ORAL at 08:46

## 2023-08-15 RX ADMIN — PERFLUTREN 2 ML/MIN: 6.52 INJECTION, SUSPENSION INTRAVENOUS at 11:18

## 2023-08-15 RX ADMIN — INSULIN LISPRO 10 UNITS: 100 INJECTION, SOLUTION INTRAVENOUS; SUBCUTANEOUS at 11:53

## 2023-08-15 RX ADMIN — CEFEPIME 2000 MG: 2 INJECTION, POWDER, FOR SOLUTION INTRAVENOUS at 05:28

## 2023-08-15 RX ADMIN — INSULIN LISPRO 10 UNITS: 100 INJECTION, SOLUTION INTRAVENOUS; SUBCUTANEOUS at 16:57

## 2023-08-15 RX ADMIN — PREGABALIN 100 MG: 100 CAPSULE ORAL at 08:45

## 2023-08-15 RX ADMIN — FUROSEMIDE 40 MG: 10 INJECTION, SOLUTION INTRAVENOUS at 16:57

## 2023-08-15 RX ADMIN — FLUTICASONE PROPIONATE 1 SPRAY: 50 SPRAY, METERED NASAL at 08:47

## 2023-08-15 RX ADMIN — NYSTATIN: 100000 POWDER TOPICAL at 17:02

## 2023-08-15 RX ADMIN — FUROSEMIDE 40 MG: 10 INJECTION, SOLUTION INTRAVENOUS at 09:43

## 2023-08-15 RX ADMIN — FLUTICASONE FUROATE AND VILANTEROL TRIFENATATE 1 PUFF: 100; 25 POWDER RESPIRATORY (INHALATION) at 08:01

## 2023-08-15 RX ADMIN — INSULIN LISPRO 4 UNITS: 100 INJECTION, SOLUTION INTRAVENOUS; SUBCUTANEOUS at 16:57

## 2023-08-15 RX ADMIN — Medication 250 MG: at 17:01

## 2023-08-15 RX ADMIN — ACETAMINOPHEN 650 MG: 325 TABLET ORAL at 00:40

## 2023-08-15 RX ADMIN — METOPROLOL SUCCINATE 50 MG: 50 TABLET, EXTENDED RELEASE ORAL at 08:45

## 2023-08-15 RX ADMIN — PREGABALIN 100 MG: 100 CAPSULE ORAL at 17:01

## 2023-08-15 RX ADMIN — NYSTATIN: 100000 POWDER TOPICAL at 08:50

## 2023-08-15 RX ADMIN — ATORVASTATIN CALCIUM 20 MG: 20 TABLET, FILM COATED ORAL at 17:01

## 2023-08-15 RX ADMIN — INSULIN LISPRO 4 UNITS: 100 INJECTION, SOLUTION INTRAVENOUS; SUBCUTANEOUS at 11:53

## 2023-08-15 RX ADMIN — Medication 250 MG: at 08:45

## 2023-08-15 RX ADMIN — INSULIN LISPRO 10 UNITS: 100 INJECTION, SOLUTION INTRAVENOUS; SUBCUTANEOUS at 07:58

## 2023-08-15 RX ADMIN — ACETAMINOPHEN 650 MG: 325 TABLET ORAL at 18:55

## 2023-08-15 RX ADMIN — METOPROLOL SUCCINATE 50 MG: 50 TABLET, EXTENDED RELEASE ORAL at 17:01

## 2023-08-15 RX ADMIN — PANTOPRAZOLE SODIUM 40 MG: 40 TABLET, DELAYED RELEASE ORAL at 08:45

## 2023-08-15 RX ADMIN — CEFEPIME 2000 MG: 2 INJECTION, POWDER, FOR SOLUTION INTRAVENOUS at 17:02

## 2023-08-15 RX ADMIN — MONTELUKAST 10 MG: 10 TABLET, FILM COATED ORAL at 21:10

## 2023-08-15 RX ADMIN — INSULIN LISPRO 2 UNITS: 100 INJECTION, SOLUTION INTRAVENOUS; SUBCUTANEOUS at 02:22

## 2023-08-15 NOTE — PLAN OF CARE
Problem: CARDIOVASCULAR - ADULT  Goal: Maintains optimal cardiac output and hemodynamic stability  Description: INTERVENTIONS:  - Monitor I/O, vital signs and rhythm  - Monitor for S/S and trends of decreased cardiac output  - Administer and titrate ordered vasoactive medications to optimize hemodynamic stability  - Assess quality of pulses, skin color and temperature  - Assess for signs of decreased coronary artery perfusion  - Instruct patient to report change in severity of symptoms  Outcome: Progressing     Problem: CARDIOVASCULAR - ADULT  Goal: Absence of cardiac dysrhythmias or at baseline rhythm  Description: INTERVENTIONS:  - Continuous cardiac monitoring, vital signs, obtain 12 lead EKG if ordered  - Administer antiarrhythmic and heart rate control medications as ordered  - Monitor electrolytes and administer replacement therapy as ordered  Outcome: Progressing     Problem: METABOLIC, FLUID AND ELECTROLYTES - ADULT  Goal: Electrolytes maintained within normal limits  Description: INTERVENTIONS:  - Monitor labs and assess patient for signs and symptoms of electrolyte imbalances  - Administer electrolyte replacement as ordered  - Monitor response to electrolyte replacements, including repeat lab results as appropriate  - Instruct patient on fluid and nutrition as appropriate  Outcome: Progressing     Problem: METABOLIC, FLUID AND ELECTROLYTES - ADULT  Goal: Fluid balance maintained  Description: INTERVENTIONS:  - Monitor labs   - Monitor I/O and WT  - Instruct patient on fluid and nutrition as appropriate  - Assess for signs & symptoms of volume excess or deficit  Outcome: Progressing     Problem: METABOLIC, FLUID AND ELECTROLYTES - ADULT  Goal: Glucose maintained within target range  Description: INTERVENTIONS:  - Monitor Blood Glucose as ordered  - Assess for signs and symptoms of hyperglycemia and hypoglycemia  - Administer ordered medications to maintain glucose within target range  - Assess nutritional intake and initiate nutrition service referral as needed  Outcome: Progressing     Problem: SKIN/TISSUE INTEGRITY - ADULT  Goal: Skin Integrity remains intact(Skin Breakdown Prevention)  Description: Assess:  -Perform Hung assessment every shift  -Clean and moisturize skin every every shift  -Inspect skin when repositioning, toileting, and assisting with ADLS  -Assess extremities for adequate circulation and sensation     Bed Management:  -Have minimal linens on bed & keep smooth, unwrinkled  -Change linens as needed when moist or perspiring  -Avoid sitting or lying in one position for more than 2 hours while in bed    Toileting:  -Offer bedside commode  -Assess for incontinence every 2 hours  -Use incontinent care products after each incontinent episode such as pads    Activity:  -Mobilize patient 2-3 times a day  -Encourage activity and walks on unit  -Encourage or provide ROM exercises   -Turn and reposition patient every 2 Hours  -Use appropriate equipment to lift or move patient in bed  -Instruct/ Assist with weight shifting every 2 when out of bed in chair    Skin Care:  -Avoid use of baby powder, tape, friction and shearing, hot water or constrictive clothing  -Relieve pressure over bony prominences using pillows  -Do not massage red bony areas    Outcome: Progressing     Problem: SKIN/TISSUE INTEGRITY - ADULT  Goal: Incision(s), wounds(s) or drain site(s) healing without S/S of infection  Description: INTERVENTIONS  - Assess and document dressing, incision, wound bed, drain sites and surrounding tissue  - Provide patient and family education  - Perform skin care/dressing changes every shift   Outcome: Progressing     Problem: SKIN/TISSUE INTEGRITY - ADULT  Goal: Pressure injury heals and does not worsen  Description: Interventions:  - Implement low air loss mattress or specialty surface (Criteria met)  - Apply silicone foam dressing  - Instruct/assist with weight shifting every 30 minutes when in chair   - Use special pressure reducing interventions such as pillows when in chair   - Apply fecal or urinary incontinence containment device   - Perform passive or active ROM every shift  - Turn and reposition patient & offload bony prominences every 2 hours   - Utilize friction reducing device or surface for transfers   - Use incontinent care products after each incontinent episode such as pads  - Consider nutrition services referral as needed  Outcome: Progressing

## 2023-08-15 NOTE — ASSESSMENT & PLAN NOTE
Lab Results   Component Value Date    HGBA1C 9.3 (H) 08/13/2023       Recent Labs     08/15/23  0147 08/15/23  0704 08/15/23  1113 08/15/23  1605   POCGLU 208* 158* 235* 200*       Blood Sugar Average: Last 72 hrs:  (P) [id number]   · Continue 25 units of long-acting insulin at bedtime, 10 units of Humalog 3 times daily with meals and insulin sliding scale

## 2023-08-15 NOTE — ASSESSMENT & PLAN NOTE
· Initially held metoprolol and Demadex  · Blood pressure improved, metoprolol resumed  · Patient appeared volume overloaded with +4 pitting edema bilateral lower extremities, received one-time dose of Lasix 20 mg IV on 8/12  · demadex held, currently on IV lasix  · Continue to monitor

## 2023-08-15 NOTE — CASE MANAGEMENT
Case Management Progress Note    Patient name Debe Crigler  Location [facility] [phone] MRN [id number]  : 1950 Date 8/15/2023       LOS (days): 4  Geometric Mean LOS (GMLOS) (days): 5.00  Days to GMLOS:1        OBJECTIVE:      Current admission status: Inpatient  Preferred Pharmacy:   [address] [place] [address] #[id number] Brenda Muñoz, [address] ([address])  [address] [facility] ([address])  [address] [facility] [address]  Phone: [phone] Fax: [phone]    Primary Care Provider: Foreign Arreguin MD    Primary Insurance: MEDICARE  Secondary Insurance: [employer]    PROGRESS NOTE:    SW provided copy of Patient Choice List to patient and she will review with family. Patient has not yet been medically cleared and SANDRA will continue to follow.

## 2023-08-15 NOTE — ASSESSMENT & PLAN NOTE
Lab Results   Component Value Date    EGFR 15 08/15/2023    EGFR 17 08/14/2023    EGFR 24 08/13/2023    CREATININE 2.91 (H) 08/15/2023    CREATININE 2.69 (H) 08/14/2023    CREATININE 2.01 (H) 08/13/2023   · Creatinine elevated at 2.69 > 2.91  · Bladder scan 150 mL  · Patient has had periods of hypotension  · Hold parameters for medications for SBP less than 110  · Patient had dose of IV lasix on 8/12  ·   · Check BNP in am  · Nephrology consulted  · Started on IV lasix 40 mg BID  · Follow up ECHO  · Monitor Is/Os

## 2023-08-15 NOTE — CONSULTS
[facility] 72 y.o. female MRN: [id number]  Unit/Bed#: [address] [place] [address] Encounter: [id number]    ASSESSMENT and PLAN:  Chandan Jin is a 72 y.o. female who was admitted to [address] [place] [address] after presenting with change in mental status. A renal consultation is requested today for assistance in the management of SEBASTIAN on CKD.      Nonoliguric SEBASTIAN  Etiology of SEBASTIAN: Most likely ATN in setting of infection +/- episodes of hypotension +/- cardiorenal syndrome  Etiology of CKD: Hypertension, diabetes, cardiorenal syndrome (outpatient nephrologist Dr. Mckeon Due)  Cr at baseline: 1.5-1.8, Cr at presentation: 1.64, Cr at consult: 2.9  UA: Trace protein, 0-1 RBC, 4-10 WBC  UACR 55 mg/g 03/2023  Renal imaging: Check renal ultrasound to rule out obstructive etiologies  Currently appears volume overloaded on examination and imaging  Give IV Lasix 40 mg x 2 today  Check echocardiogram  Strict I/O and daily weights  Trend BMP daily     BP/Volume  She has history of diastolic CHF and follows with heart failure service  Home medications: Toprol-XL 50 mg twice daily, torsemide 40 mg daily  Current medications:   CT chest consistent with mild septal thickening suggestive of interstitial edema  Cardiac BNP rising, to 213 today  As stated above, signs of volume overload with weight gain  Give IV Lasix 40 mg x 2 today  Update echocardiogram    Anemia of CKD  Baseline hemoglobin appears to be around 10-11  Currently hemoglobin 9.1  Check iron studies including ferritin to rule out absolute/functional iron deficiency  Transfuse to keep hemoglobin more than 7    Electrolytes/Acid Base Status   Electrolytes and acid-base status within normal limits    Bone Mineral Disease   She has hypocalcemia most likely in setting of secondary hyperparathyroidism  Check PTH, if elevated we will add calcitriol    Pneumonia  Currently on IV cefepime    Discussed with internal medicine team.  After discussion, we agreed to give IV furosemide today due to signs of volume overload and to trend BMP. HISTORY OF PRESENT ILLNESS:  Requesting Physician: Sonido Hassan DO  Reason for Consult: Acute kidney injury    Son Santillan is a 72 y.o. female who was admitted to [address] [place] [address] after presenting with change in mental status. A renal consultation is requested today for assistance in the management of SEBASTIAN on CKD. Patient was brought in because she was acting confused and was feeling short of breath for 2 days. She also had palpitations. She was noted to have fever and is currently being treated for possible pneumonia. She had previous episodes of acute kidney injury in setting of cardiorenal syndrome and creatinine did improve with diuresis. At home, her good weight is around 260 pounds.     PAST MEDICAL HISTORY:  Past Medical History:   Diagnosis Date   • SEBASTIAN (acute kidney injury) ([address]) 01/23/2023   • Anemia    • Asthma    • Atrial fibrillation ([address])    • Chronic kidney disease    • COVID-19 January 2022   • Diabetes mellitus ([address])    • GERD (gastroesophageal reflux disease)    • Hyperlipidemia    • Hypertension    • PONV (postoperative nausea and vomiting)    • Sleep apnea     wear BIPAP   • SVT (supraventricular tachycardia) ([address])        PAST SURGICAL HISTORY:  Past Surgical History:   Procedure Laterality Date   • APPENDECTOMY     • BREAST BIOPSY Right     years ago when she was 23   • BREAST BIOPSY Right 03/13/2023   • BREAST LUMPECTOMY Right 5/30/2023    Procedure: RIGHT BREAST KRIS  DIRECTED LUMPECTOMY - Bren Archer;  Surgeon: Rudolph Johnston MD;  Location: [facility];  Service: Surgical Oncology   • CYSTOSCOPY W/ LASER LITHOTRIPSY Left 07/12/2016    Procedure: CYSTOSCOPY URETEROSCOPY WITH LITHOTRIPSY HOLMIUM LASER, RETROGRADE PYELOGRAM AND INSERTION STENT URETERAL;  Surgeon: Jaswinder Cui MD;  Location: [facility];  Service:    • DILATION AND CURETTAGE OF UTERUS     • IR THORACENTESIS 2022   • JOINT REPLACEMENT      right knee   • KNEE ARTHROPLASTY Right    • MAMMO NEEDLE LOCALIZATION LEFT (ALL INC) EACH ADD Right 2023   • MAMMO STEREOTACTIC BREAST BIOPSY RIGHT (ALL INC) Right 2023    High Risk Lesion   • HI ARTHROPLASTY GLENOHUMERAL JOINT TOTAL SHOULDER Left 2022    Procedure: ARTHROPLASTY SHOULDER REVERSE;  Surgeon: Rochelle Harp MD;  Location: [facility];  Service: Orthopedics   • HI CYSTO BLADDER W/URETERAL CATHETERIZATION Left 2016    Procedure: CYSTOSCOPY RETROGRADE PYELOGRAM WITH INSERTION STENT URETERAL, left;  Surgeon: Oliver Portillo MD;  Location: [facility];  Service: Urology   • SHOULDER ARTHROTOMY Left        ALLERGIES:  Allergies   Allergen Reactions   • Penicillins Hives   • Moxifloxacin Other (See Comments)     unknown   • Oxycodone-Acetaminophen Other (See Comments)     GI upset   • Zinc Acetate Other (See Comments)     unknown   • Penicillins Hives   • Asa [Aspirin] GI Intolerance   • Indocin [Indomethacin] Other (See Comments)     Made patient "loopy"   • Other Other (See Comments)     unknown   • Tannic Acid Rash       SOCIAL HISTORY:  Social History     Substance and Sexual Activity   Alcohol Use Not Currently    Comment: rarely     Social History     Substance and Sexual Activity   Drug Use Never     Social History     Tobacco Use   Smoking Status Former   • Packs/day: 1.00   • Years: 20.00   • Total pack years: 20.00   • Types: Cigarettes   • Quit date: 1996   • Years since quittin.1   • Passive exposure: Past   Smokeless Tobacco Never       FAMILY HISTORY:  Family History   Problem Relation Age of Onset   • Heart disease Mother    • Diabetes Father    • Thyroid cancer Sister    • Heart disease Brother    • Diabetes Brother    • Heart disease Brother    • Heart disease Brother    • Emphysema Maternal Grandmother    • Heart disease Family        MEDICATIONS:    Current Facility-Administered Medications:   •  acetaminophen (TYLENOL) tablet 650 mg, 650 mg, Oral, Q6H PRN, RK Page, 650 mg at 08/15/23 0040  •  ammonium lactate (LAC-HYDRIN) 12 % lotion 1 Application, 1 Application, Topical, BID PRN, Roe Loots, RK, 1 Application at 23/02/02 8416  •  apixaban (ELIQUIS) tablet 5 mg, 5 mg, Oral, BID, RK Page, 5 mg at 08/14/23 1753  •  atorvastatin (LIPITOR) tablet 20 mg, 20 mg, Oral, Daily With RK Walsh, 20 mg at 08/14/23 1629  •  cefepime (MAXIPIME) 2 g/50 mL dextrose IVPB, 2 g, Intravenous, Q12H, Cristal Pennington DO, Last Rate: 100 mL/hr at 08/15/23 0528, 2,000 mg at 08/15/23 0528  •  fluticasone (FLONASE) 50 mcg/act nasal spray 1 spray, 1 spray, Nasal, Daily, RK Page, 1 spray at 08/14/23 2082  •  Fluticasone Furoate-Vilanterol 100-25 mcg/actuation 1 puff, 1 puff, Inhalation, Daily, RK Page, 1 puff at 08/15/23 0801  •  insulin glargine (LANTUS) subcutaneous injection 25 Units 0.25 mL, 25 Units, Subcutaneous, HS, RK Page, 25 Units at 08/14/23 2126  •  insulin lispro (HumaLOG) 100 units/mL subcutaneous injection 10 Units, 10 Units, Subcutaneous, TID With Meals, RK Page, 10 Units at 08/15/23 0758  •  insulin lispro (HumaLOG) 100 units/mL subcutaneous injection 2-12 Units, 2-12 Units, Subcutaneous, TID AC, 2 Units at 08/15/23 0758 **AND** Fingerstick Glucose (POCT), , , TID AC, RK Page  •  insulin lispro (HumaLOG) 100 units/mL subcutaneous injection 2-12 Units, 2-12 Units, Subcutaneous, HS, RK Page, 4 Units at 08/14/23 2126  •  insulin lispro (HumaLOG) 100 units/mL subcutaneous injection 2-12 Units, 2-12 Units, Subcutaneous, 0200, RK Page, 2 Units at 08/15/23 0222  •  metoprolol succinate (TOPROL-XL) 24 hr tablet 50 mg, 50 mg, Oral, BID, Roe Claros, RK, 50 mg at 08/14/23 1753  •  montelukast (SINGULAIR) tablet 10 mg, 10 mg, Oral, HS, RK Page, 10 mg at 08/14/23 2127  •  nystatin (MYCOSTATIN) powder, , Topical, BID, Leslie Gambler, CRNP, Given at 08/14/23 1759  •  ondansetron [facility]) injection 4 mg, 4 mg, Intravenous, Q6H PRN, Cristal Марияar, DO, 4 mg at 08/13/23 1752  •  pantoprazole (PROTONIX) EC tablet 40 mg, 40 mg, Oral, Daily, Charliene Graham, CRNP, 40 mg at 08/14/23 9276  •  pregabalin (LYRICA) capsule 100 mg, 100 mg, Oral, BID, Charliene Graham, CRNP, 100 mg at 08/14/23 1754  •  saccharomyces boulardii (FLORASTOR) capsule 250 mg, 250 mg, Oral, BID, Leslie Gambler, CRNP, 250 mg at 08/14/23 1753    REVIEW OF SYSTEMS:  Review of Systems   Constitutional: Negative for chills and fever. HENT: Negative for ear pain and sore throat. Eyes: Negative for pain and visual disturbance. Respiratory: Positive for shortness of breath. Negative for cough. Cardiovascular: Positive for leg swelling. Negative for chest pain and palpitations. Gastrointestinal: Negative for abdominal pain and vomiting. Genitourinary: Negative for dysuria and hematuria. Musculoskeletal: Negative for arthralgias and back pain. Skin: Negative for color change and rash. Neurological: Negative for seizures and syncope. All other systems reviewed and are negative. PHYSICAL EXAM:  Current Weight: Weight - Scale: 123 kg (272 lb 3.2 oz)  First Weight: Weight - Scale: 124 kg (273 lb 2.4 oz)  Vitals:    08/14/23 2100 08/14/23 2214 08/15/23 0600 08/15/23 0706   BP:  122/54  119/53   BP Location:       Pulse:  72  72   Resp:  18  18   Temp:  97.7 °F (36.5 °C)  97.7 °F (36.5 °C)   TempSrc:       SpO2: 100% 100%  96%   Weight:   123 kg (272 lb 3.2 oz)    Height:           Intake/Output Summary (Last 24 hours) at 8/15/2023 0682  Last data filed at 8/14/2023 2000  Gross per 24 hour   Intake 500 ml   Output 1100 ml   Net -600 ml     Physical Exam  Constitutional:       Appearance: Normal appearance. HENT:      Head: Normocephalic and atraumatic. Mouth/Throat:      Mouth: Mucous membranes are moist.      Pharynx: Oropharynx is clear. Cardiovascular:      Rate and Rhythm: Normal rate and regular rhythm. Pulses: Normal pulses. Heart sounds: Normal heart sounds. Pulmonary:      Effort: Pulmonary effort is normal.      Breath sounds: Rales present. Abdominal:      General: Bowel sounds are normal.      Palpations: Abdomen is soft. Musculoskeletal:         General: Normal range of motion. Right lower leg: Edema present. Left lower leg: Edema present. Skin:     General: Skin is warm and dry. Neurological:      General: No focal deficit present. Mental Status: She is alert and oriented to person, place, and time. Mental status is at baseline. Psychiatric:         Mood and Affect: Mood normal.         Behavior: Behavior normal.         Thought Content: Thought content normal.         Judgment: Judgment normal.       Lab Results:   Results from last 7 days   Lab Units 08/15/23  0432 08/14/23  0559 08/13/23  0446 08/12/23  0512 08/11/23  1236   WBC Thousand/uL 11.61* 14.02* 14.04*   < > 16.88*   HEMOGLOBIN g/dL 9.1* 9.6* 10.1*   < > 11.9   HEMATOCRIT % 30.2* 31.8* 33.0*   < > 37.6   PLATELETS Thousands/uL 143* 142* 133*   < > 171   POTASSIUM mmol/L 4.6 4.5 4.2   < > 4.5   CHLORIDE mmol/L 102 101 104   < > 97   CO2 mmol/L 28 27 29   < > 33*   BUN mg/dL 58* 47* 37*   < > 35*   CREATININE mg/dL 2.91* 2.69* 2.01*   < > 1.64*   CALCIUM mg/dL 7.9* 8.2* 7.9*   < > 9.0   ALK PHOS U/L  --   --   --   --  54   ALT U/L  --   --   --   --  12   AST U/L  --   --   --   --  15    < > = values in this interval not displayed. Portions of the record may have been created with voice recognition software. Occasional wrong word or "sound a like" substitutions may have occurred due to the inherent limitations of voice recognition software. Read the chart carefully and recognize, using context, where substitutions have occurred.  If you have any questions, please contact the dictating provider.

## 2023-08-15 NOTE — PLAN OF CARE
Problem: RESPIRATORY - ADULT  Goal: Achieves optimal ventilation and oxygenation  Description: INTERVENTIONS:  - Assess for changes in respiratory status  - Assess for changes in mentation and behavior  - Position to facilitate oxygenation and minimize respiratory effort  - Oxygen administered by appropriate delivery if ordered  - Initiate smoking cessation education as indicated  - Encourage broncho-pulmonary hygiene including cough, deep breathe, Incentive Spirometry  - Assess the need for suctioning and aspirate as needed  - Assess and instruct to report SOB or any respiratory difficulty  - Respiratory Therapy support as indicated  Outcome: Progressing     Problem: INFECTION - ADULT  Goal: Absence or prevention of progression during hospitalization  Description: INTERVENTIONS:  - Assess and monitor for signs and symptoms of infection  - Monitor lab/diagnostic results  - Monitor all insertion sites, i.e. indwelling lines, tubes, and drains  - Monitor endotracheal if appropriate and nasal secretions for changes in amount and color  - [place] appropriate cooling/warming therapies per order  - Administer medications as ordered  - Instruct and encourage patient and family to use good hand hygiene technique  - Identify and instruct in appropriate isolation precautions for identified infection/condition  Outcome: Progressing  Goal: Absence of fever/infection during neutropenic period  Description: INTERVENTIONS:  - Monitor WBC    Outcome: Progressing     Problem: SAFETY ADULT  Goal: Patient will remain free of falls  Description: INTERVENTIONS:  - Educate patient/family on patient safety including physical limitations  - Instruct patient to call for assistance with activity   - Consult OT/PT to assist with strengthening/mobility   - Keep Call bell within reach  - Keep bed low and locked with side rails adjusted as appropriate  - Keep care items and personal belongings within reach  - Initiate and maintain comfort rounds  - Make Fall Risk Sign visible to staff  - Offer Toileting every 2 Hours, in advance of need  - Initiate/Maintain bed alarm  - Obtain necessary fall risk management equipment: yes   - Apply yellow socks and bracelet for high fall risk patients  - Consider moving patient to room near nurses station  Outcome: Progressing  Goal: Maintain or return to baseline ADL function  Description: INTERVENTIONS:  -  Assess patient's ability to carry out ADLs; assess patient's baseline for ADL function and identify physical deficits which impact ability to perform ADLs (bathing, care of mouth/teeth, toileting, grooming, dressing, etc.)  - Assess/evaluate cause of self-care deficits   - Assess range of motion  - Assess patient's mobility; develop plan if impaired  - Assess patient's need for assistive devices and provide as appropriate  - Encourage maximum independence but intervene and supervise when necessary  - Involve family in performance of ADLs  - Assess for home care needs following discharge   - Consider OT consult to assist with ADL evaluation and planning for discharge  - Provide patient education as appropriate  Outcome: Progressing  Goal: Maintains/Returns to pre admission functional level  Description: INTERVENTIONS:  - Perform BMAT or MOVE assessment daily.   - Set and communicate daily mobility goal to care team and patient/family/caregiver. - Collaborate with rehabilitation services on mobility goals if consulted  - Perform Range of Motion 3 times a day. - Reposition patient every 2 hours.   - Dangle patient 3 times a day  - Stand patient 3 times a day  - Ambulate patient 3 times a day  - Out of bed to chair 3 times a day   - Out of bed for meals 3 times a day  - Out of bed for toileting  - Record patient progress and toleration of activity level   Outcome: Progressing     Problem: DISCHARGE PLANNING  Goal: Discharge to home or other facility with appropriate resources  Description: INTERVENTIONS:  - Identify barriers to discharge w/patient and caregiver  - Arrange for needed discharge resources and transportation as appropriate  - Identify discharge learning needs (meds, wound care, etc.)  - Arrange for interpretive services to assist at discharge as needed  - Refer to Case Management Department for coordinating discharge planning if the patient needs post-hospital services based on physician/advanced practitioner order or complex needs related to functional status, cognitive ability, or social support system  Outcome: Progressing     Problem: Knowledge Deficit  Goal: Patient/family/caregiver demonstrates understanding of disease process, treatment plan, medications, and discharge instructions  Description: Complete learning assessment and assess knowledge base.   Interventions:  - Provide teaching at level of understanding  - Provide teaching via preferred learning methods  Outcome: Progressing     Problem: CARDIOVASCULAR - ADULT  Goal: Maintains optimal cardiac output and hemodynamic stability  Description: INTERVENTIONS:  - Monitor I/O, vital signs and rhythm  - Monitor for S/S and trends of decreased cardiac output  - Administer and titrate ordered vasoactive medications to optimize hemodynamic stability  - Assess quality of pulses, skin color and temperature  - Assess for signs of decreased coronary artery perfusion  - Instruct patient to report change in severity of symptoms  Outcome: Progressing  Goal: Absence of cardiac dysrhythmias or at baseline rhythm  Description: INTERVENTIONS:  - Continuous cardiac monitoring, vital signs, obtain 12 lead EKG if ordered  - Administer antiarrhythmic and heart rate control medications as ordered  - Monitor electrolytes and administer replacement therapy as ordered  Outcome: Progressing     Problem: METABOLIC, FLUID AND ELECTROLYTES - ADULT  Goal: Electrolytes maintained within normal limits  Description: INTERVENTIONS:  - Monitor labs and assess patient for signs and symptoms of electrolyte imbalances  - Administer electrolyte replacement as ordered  - Monitor response to electrolyte replacements, including repeat lab results as appropriate  - Instruct patient on fluid and nutrition as appropriate  Outcome: Progressing  Goal: Fluid balance maintained  Description: INTERVENTIONS:  - Monitor labs   - Monitor I/O and WT  - Instruct patient on fluid and nutrition as appropriate  - Assess for signs & symptoms of volume excess or deficit  Outcome: Progressing  Goal: Glucose maintained within target range  Description: INTERVENTIONS:  - Monitor Blood Glucose as ordered  - Assess for signs and symptoms of hyperglycemia and hypoglycemia  - Administer ordered medications to maintain glucose within target range  - Assess nutritional intake and initiate nutrition service referral as needed  Outcome: Progressing     Problem: SKIN/TISSUE INTEGRITY - ADULT  Goal: Skin Integrity remains intact(Skin Breakdown Prevention)  Description: Assess:  -Perform Hung assessment every shift   -Clean and moisturize skin every shift   -Inspect skin when repositioning, toileting, and assisting with ADLS  -Assess under medical devices such as Masimo every shift   -Assess extremities for adequate circulation and sensation     Bed Management:  -Have minimal linens on bed & keep smooth, unwrinkled  -Change linens as needed when moist or perspiring  -Avoid sitting or lying in one position for more than 2 hours while in bed  -Keep HOB at 30 degrees     Toileting:  -Offer bedside commode  -Assess for incontinence every shift   -Use incontinent care products after each incontinent episode such as Remedy    Activity:  -Mobilize patient 3 times a day  -Encourage activity and walks on unit  -Encourage or provide ROM exercises   -Turn and reposition patient every 2 Hours  -Use appropriate equipment to lift or move patient in bed  -Instruct/ Assist with weight shifting every 2 hours when out of bed in chair  -Consider limitation of chair time 2 hour intervals    Skin Care:  -Avoid use of baby powder, tape, friction and shearing, hot water or constrictive clothing  -Relieve pressure over bony prominences using Pillow and foam wedges  -Do not massage red bony areas    Next Steps:  -Teach patient strategies to minimize risks such as Weight shifting    -Consider consults to  interdisciplinary teams such as wounds  Outcome: Progressing  Goal: Incision(s), wounds(s) or drain site(s) healing without S/S of infection  Description: INTERVENTIONS  - Assess and document dressing, incision, wound bed, drain sites and surrounding tissue  - Provide patient and family education  - Perform skin care/dressing changes every shift   Outcome: Progressing  Goal: Pressure injury heals and does not worsen  Description: Interventions:  - Implement low air loss mattress or specialty surface (Criteria met)  - Apply silicone foam dressing  - Instruct/assist with weight shifting every 30 minutes when in chair   - Limit chair time to 2 hour intervals  - Use special pressure reducing interventions such as waffle cushion when in chair   - Apply fecal or urinary incontinence containment device   - Perform passive or active ROM every shift   - Turn and reposition patient & offload bony prominences every 2 hours   - Utilize friction reducing device or surface for transfers   - Consider consults to  interdisciplinary teams such as wounds  - Use incontinent care products after each incontinent episode such as Remedy  - Consider nutrition services referral as needed  Outcome: Progressing     Problem: MOBILITY - ADULT  Goal: Maintain or return to baseline ADL function  Description: INTERVENTIONS:  -  Assess patient's ability to carry out ADLs; assess patient's baseline for ADL function and identify physical deficits which impact ability to perform ADLs (bathing, care of mouth/teeth, toileting, grooming, dressing, etc.)  - Assess/evaluate cause of self-care deficits - Assess range of motion  - Assess patient's mobility; develop plan if impaired  - Assess patient's need for assistive devices and provide as appropriate  - Encourage maximum independence but intervene and supervise when necessary  - Involve family in performance of ADLs  - Assess for home care needs following discharge   - Consider OT consult to assist with ADL evaluation and planning for discharge  - Provide patient education as appropriate  Outcome: Progressing  Goal: Maintains/Returns to pre admission functional level  Description: INTERVENTIONS:  - Perform BMAT or MOVE assessment daily.   - Set and communicate daily mobility goal to care team and patient/family/caregiver. - Collaborate with rehabilitation services on mobility goals if consulted  - Perform Range of Motion 3 times a day. - Reposition patient every 2 hours.   - Dangle patient 3 times a day  - Stand patient 3 times a day  - Ambulate patient 3 times a day  - Out of bed to chair 3 times a day   - Out of bed for meals 3 times a day  - Out of bed for toileting  - Record patient progress and toleration of activity level   Outcome: Progressing     Problem: Prexisting or High Potential for Compromised Skin Integrity  Goal: Skin integrity is maintained or improved  Description: INTERVENTIONS:  - Identify patients at risk for skin breakdown  - Assess and monitor skin integrity  - Assess and monitor nutrition and hydration status  - Monitor labs   - Assess for incontinence   - Turn and reposition patient  - Assist with mobility/ambulation  - Relieve pressure over bony prominences  - Avoid friction and shearing  - Provide appropriate hygiene as needed including keeping skin clean and dry  - Evaluate need for skin moisturizer/barrier cream  - Collaborate with interdisciplinary team   - Patient/family teaching  - Consider wound care consult   Outcome: Progressing

## 2023-08-15 NOTE — ASSESSMENT & PLAN NOTE
Patient reports feeling unwell for 2 days with intermittent shortness of breath. Morning of admission, when she woke up her heart was racing prompting her to come to the emergency department. On arrival to the ER she was found to have fever of 103. She meets sepsis criteria with fever, tachycardia, leukocytosis. She had a chest x-ray which revealed a right base infiltrate. Flu and COVID-negative  · Started on Rocephin, and azithromycin; urine antigens negative, d/c azithro  · Procalcitonin 0.08 > 0.23 > 0.42 > .36  · Blood cultures negative to date  · Urine culture growing contaminant  · Patient spiking fevers overnight 8/13 with tmax 103.7  · abx broadened to cefepime  · Repeat blood cultures no growth to date  · CT chest: No definite pneumonia. Mild septal thickening suggestive of interstitial edema. Dependent atelectasis in the lower lobes.   · Continue cefepime for now and continue to monitor  · Patient clinically improving and leukocytosis and procal improving

## 2023-08-15 NOTE — PROGRESS NOTES
[id number] [facility]  Progress Note  Name: Kieran Felty  MRN: [id number]  Unit/Bed#: 4 [address] Date of Admission: 8/11/2023   Date of Service: 8/15/2023 I Hospital Day: 4    Assessment/Plan   * Sepsis due to pneumonia [facility])  Assessment & Plan  Patient reports feeling unwell for 2 days with intermittent shortness of breath. Morning of admission, when she woke up her heart was racing prompting her to come to the emergency department. On arrival to the ER she was found to have fever of 103. She meets sepsis criteria with fever, tachycardia, leukocytosis. She had a chest x-ray which revealed a right base infiltrate. Flu and COVID-negative  · Started on Rocephin, and azithromycin; urine antigens negative, d/c azithro  · Procalcitonin 0.08 > 0.23 > 0.42 > .36  · Blood cultures negative to date  · Urine culture growing contaminant  · Patient spiking fevers overnight 8/13 with tmax 103.7  · abx broadened to cefepime  · Repeat blood cultures no growth to date  · CT chest: No definite pneumonia. Mild septal thickening suggestive of interstitial edema. Dependent atelectasis in the lower lobes.   · Continue cefepime for now and continue to monitor  · Patient clinically improving and leukocytosis and procal improving    Essential hypertension  Assessment & Plan  · Initially held metoprolol and Demadex  · Blood pressure improved, metoprolol resumed  · Patient appeared volume overloaded with +4 pitting edema bilateral lower extremities, received one-time dose of Lasix 20 mg IV on 8/12  · demadex held, currently on IV lasix  · Continue to monitor    Acute kidney injury superimposed on chronic kidney disease [facility])  Assessment & Plan  Lab Results   Component Value Date    EGFR 15 08/15/2023    EGFR 17 08/14/2023    EGFR 24 08/13/2023    CREATININE 2.91 (H) 08/15/2023    CREATININE 2.69 (H) 08/14/2023    CREATININE 2.01 (H) 08/13/2023   · Creatinine elevated at 2.69 > 2.91  · Bladder scan 150 mL  · Patient has had periods of hypotension  · Hold parameters for medications for SBP less than 110  · Patient had dose of IV lasix on 8/12  ·   · Check BNP in am  · Nephrology consulted  · Started on IV lasix 40 mg BID  · Follow up ECHO  · Monitor Is/Os    Ductal carcinoma in situ (DCIS) of right breast  Assessment & Plan  · Patient had breast mass removed recently and is in the process of seeing radiation oncology  · Radiation was to start 8/16 at [facility]  · Outpatient follow-up    Type 2 diabetes mellitus with hyperglycemia, without long-term current use of insulin [facility])  Assessment & Plan  Lab Results   Component Value Date    HGBA1C 9.3 (H) 08/13/2023       Recent Labs     08/15/23  0147 08/15/23  0704 08/15/23  1113 08/15/23  1605   POCGLU 208* 158* 235* 200*       Blood Sugar Average: Last 72 hrs:  (P) [id number]   · Continue 25 units of long-acting insulin at bedtime, 10 units of Humalog 3 times daily with meals and insulin sliding scale    Chronic respiratory failure with hypoxia and hypercapnia (HCC)  Assessment & Plan  · Patient wears oxygen at home at night with her CPAP but is not oxygen dependent during the day  · Keep O2 sats greater than 92%  · Currently stable on 2 L    KATRINA (obstructive sleep apnea)  Assessment & Plan  · Continue CPAP hs    Paroxysmal atrial fibrillation (HCC)  Assessment & Plan  · Continue Eliquis  · Continue metoprolol    Morbid obesity with BMI of 45.0-49.9, adult (HCC)  Assessment & Plan  · Dietary lifestyle modifications         VTE Pharmacologic Prophylaxis: VTE Score: 5 High Risk (Score >/= 5) - Pharmacological DVT Prophylaxis Ordered: apixaban (Eliquis). Sequential Compression Devices Ordered. Patient Centered Rounds: I performed bedside rounds with nursing staff today. Discussions with Specialists or Other Care Team Provider: nephro, rn, cm    Education and Discussions with Family / Patient: Patient declined call to .      Total Time Spent on Date of Encounter in care of patient: 35 minutes This time was spent on one or more of the following: performing physical exam; counseling and coordination of care; obtaining or reviewing history; documenting in the medical record; reviewing/ordering tests, medications or procedures; communicating with other healthcare professionals and discussing with patient's family/caregivers. Current Length of Stay: 4 day(s)  Current Patient Status: Inpatient   Certification Statement: The patient will continue to require additional inpatient hospital stay due to pneumonia, IV abx, SEBASTIAN, IV diuretics  Discharge Plan: Anticipate discharge in 48-72 hrs to rehab facility. Code Status: Level 1 - Full Code    Subjective:   Patient reports she is feeling better today. Her shortness of breath is improving. She denies fever, chills, chest pain, nausea, vomiting, abdominal pain, diarrhea. Objective:     Vitals:   Temp (24hrs), Av.7 °F (36.5 °C), Min:97.4 °F (36.3 °C), Max:98 °F (36.7 °C)    Temp:  [97.4 °F (36.3 °C)-98 °F (36.7 °C)] 97.4 °F (36.3 °C)  HR:  [67-78] 68  Resp:  [18] 18  BP: (112-128)/(53-72) 127/60  SpO2:  [96 %-100 %] 99 %  Body mass index is 49.75 kg/m². Input and Output Summary (last 24 hours): Intake/Output Summary (Last 24 hours) at 8/15/2023 1629  Last data filed at 8/15/2023 1248  Gross per 24 hour   Intake 660 ml   Output 400 ml   Net 260 ml       Physical Exam:   Physical Exam  Vitals and nursing note reviewed. Constitutional:       General: She is not in acute distress. Appearance: She is well-developed. Cardiovascular:      Rate and Rhythm: Normal rate and regular rhythm. Heart sounds: No murmur heard. Pulmonary:      Effort: Pulmonary effort is normal.      Breath sounds: Normal breath sounds. Abdominal:      Palpations: Abdomen is soft. Tenderness: There is no abdominal tenderness. Musculoskeletal:         General: No swelling. Right lower leg: Edema present.       Left lower leg: Edema present. Skin:     Capillary Refill: Capillary refill takes less than 2 seconds. Neurological:      Mental Status: She is alert. Psychiatric:         Mood and Affect: Mood normal.         Additional Data:     Labs:  Results from last 7 days   Lab Units 08/15/23  0432   WBC Thousand/uL 11.61*   HEMOGLOBIN g/dL 9.1*   HEMATOCRIT % 30.2*   PLATELETS Thousands/uL 143*   NEUTROS PCT % 68   LYMPHS PCT % 12*   MONOS PCT % 10   EOS PCT % 8*     Results from last 7 days   Lab Units 08/15/23  0432 08/12/23  0512 08/11/23  1236   SODIUM mmol/L 136   < > 137   POTASSIUM mmol/L 4.6   < > 4.5   CHLORIDE mmol/L 102   < > 97   CO2 mmol/L 28   < > 33*   BUN mg/dL 58*   < > 35*   CREATININE mg/dL 2.91*   < > 1.64*   ANION GAP mmol/L 6   < > 7   CALCIUM mg/dL 7.9*   < > 9.0   ALBUMIN g/dL  --   --  3.7   TOTAL BILIRUBIN mg/dL  --   --  0.56   ALK PHOS U/L  --   --  54   ALT U/L  --   --  12   AST U/L  --   --  15   GLUCOSE RANDOM mg/dL 162*   < > 190*    < > = values in this interval not displayed.      Results from last 7 days   Lab Units 08/11/23  1236   INR  1.28*     Results from last 7 days   Lab Units 08/15/23  1605 08/15/23  1113 08/15/23  0704 08/15/23  0147 08/14/23  2041 08/14/23  1557 08/14/23  1111 08/14/23  0705 08/14/23  0138 08/13/23  2038 08/13/23  1553 08/13/23  1112   POC GLUCOSE mg/dl 200* 235* 158* 208* 239* 165* 230* 154* 180* 235* 161* 128     Results from last 7 days   Lab Units 08/13/23  0446   HEMOGLOBIN A1C % 9.3*     Results from last 7 days   Lab Units 08/15/23  0432 08/14/23  0559 08/13/23  1956 08/12/23  0512 08/11/23  1236   LACTIC ACID mmol/L  --   --  0.6  --  1.2   PROCALCITONIN ng/ml 0.36* 0.42*  --  0.23 0.08       Lines/Drains:  Invasive Devices     Peripheral Intravenous Line  Duration           Peripheral IV 08/11/23 Distal;Right;Upper;Ventral (anterior) Arm 4 days          Drain  Duration           External Urinary Catheter 1 day                      Imaging: No pertinent imaging reviewed. Recent Cultures (last 7 days):   Results from last 7 days   Lab Units 08/13/23  1956 08/12/23  0557 08/11/23  1308 08/11/23  1236   BLOOD CULTURE  No Growth at 24 hrs. No Growth at 24 hrs.  --   --  No Growth at 72 hrs. No Growth at 72 hrs.    URINE CULTURE   --   --  >100,000 cfu/ml Gardnerella vaginalis*  <10,000 cfu/ml  --    LEGIONELLA URINARY ANTIGEN   --  Negative  --   --        Last 24 Hours Medication List:   Current Facility-Administered Medications   Medication Dose Route Frequency Provider Last Rate   • acetaminophen  650 mg Oral Q6H PRN RK Smith     • ammonium lactate  1 Application Topical BID PRN RK Rodriguez     • apixaban  5 mg Oral BID RK Smith     • atorvastatin  20 mg Oral Daily With Dinner RK Smith     • cefepime  2 g Intravenous Q12H Cristal Revankar, DO 2,000 mg (08/15/23 0528)   • fluticasone  1 spray Nasal Daily RK Smith     • Fluticasone Furoate-Vilanterol  1 puff Inhalation Daily RK Smith     • furosemide  40 mg Intravenous BID (diuretic) Albert Valdez MD     • insulin glargine  25 Units Subcutaneous HS RK Smith     • insulin lispro  10 Units Subcutaneous TID With Meals RK Smith     • insulin lispro  2-12 Units Subcutaneous TID AC RK Smith     • insulin lispro  2-12 Units Subcutaneous HS RK Simth     • insulin lispro  2-12 Units Subcutaneous 0200 RK Smith     • metoprolol succinate  50 mg Oral BID RK Rodriguez     • montelukast  10 mg Oral HS RK Smith     • nystatin   Topical BID RK Rodriguez     • ondansetron  4 mg Intravenous Q6H PRN Cristal Revankar, DO     • pantoprazole  40 mg Oral Daily RK Smith     • pregabalin  100 mg Oral BID RK Smith     • saccharomyces boulardii  250 mg Oral BID RK Rodriguez          Today, Patient Was Seen By: Marina Handy PA-C    **Please Note: This note may have been constructed using a voice recognition system. **

## 2023-08-15 NOTE — ASSESSMENT & PLAN NOTE
· Patient had breast mass removed recently and is in the process of seeing radiation oncology  · Radiation was to start 8/16 at [facility]  · Outpatient follow-up

## 2023-08-16 ENCOUNTER — APPOINTMENT (INPATIENT)
Dept: RADIOLOGY | Facility: HOSPITAL | Age: 73
DRG: 853 | End: 2023-08-16
Payer: MEDICARE

## 2023-08-16 PROBLEM — R65.21 SEPTIC SHOCK (HCC): Status: ACTIVE | Noted: 2022-03-28

## 2023-08-16 PROBLEM — L03.90 CELLULITIS: Status: ACTIVE | Noted: 2023-01-01

## 2023-08-16 PROBLEM — R82.71 BACTERIURIA: Status: ACTIVE | Noted: 2023-08-16

## 2023-08-16 PROBLEM — G92.8 TOXIC METABOLIC ENCEPHALOPATHY: Status: ACTIVE | Noted: 2022-05-16

## 2023-08-16 LAB
ALBUMIN SERPL BCP-MCNC: 3.2 G/DL (ref 3.5–5)
ALP SERPL-CCNC: 43 U/L (ref 34–104)
ALT SERPL W P-5'-P-CCNC: 8 U/L (ref 7–52)
ANION GAP SERPL CALCULATED.3IONS-SCNC: 8 MMOL/L
ANION GAP SERPL CALCULATED.3IONS-SCNC: 8 MMOL/L
ARTERIAL PATENCY WRIST A: YES
AST SERPL W P-5'-P-CCNC: 9 U/L (ref 13–39)
BACTERIA BLD CULT: NORMAL
BACTERIA BLD CULT: NORMAL
BACTERIA UR QL AUTO: ABNORMAL /HPF
BASE EXCESS BLDA CALC-SCNC: 0.8 MMOL/L
BILIRUB SERPL-MCNC: 0.41 MG/DL (ref 0.2–1)
BILIRUB UR QL STRIP: NEGATIVE
BODY TEMPERATURE: 100.6 DEGREES FEHRENHEIT
BUN SERPL-MCNC: 65 MG/DL (ref 5–25)
BUN SERPL-MCNC: 67 MG/DL (ref 5–25)
CA-I BLD-SCNC: 1.08 MMOL/L (ref 1.12–1.32)
CALCIUM ALBUM COR SERPL-MCNC: 9.4 MG/DL (ref 8.3–10.1)
CALCIUM SERPL-MCNC: 8.5 MG/DL (ref 8.4–10.2)
CALCIUM SERPL-MCNC: 8.8 MG/DL (ref 8.4–10.2)
CHLORIDE SERPL-SCNC: 103 MMOL/L (ref 96–108)
CHLORIDE SERPL-SCNC: 104 MMOL/L (ref 96–108)
CLARITY UR: CLEAR
CO2 SERPL-SCNC: 28 MMOL/L (ref 21–32)
CO2 SERPL-SCNC: 28 MMOL/L (ref 21–32)
COLOR UR: ABNORMAL
CREAT SERPL-MCNC: 2.7 MG/DL (ref 0.6–1.3)
CREAT SERPL-MCNC: 2.71 MG/DL (ref 0.6–1.3)
ERYTHROCYTE [DISTWIDTH] IN BLOOD BY AUTOMATED COUNT: 15.3 % (ref 11.6–15.1)
ERYTHROCYTE [DISTWIDTH] IN BLOOD BY AUTOMATED COUNT: 15.6 % (ref 11.6–15.1)
FERRITIN SERPL-MCNC: 168 NG/ML (ref 11–307)
GFR SERPL CREATININE-BSD FRML MDRD: 17 ML/MIN/1.73SQ M
GFR SERPL CREATININE-BSD FRML MDRD: 17 ML/MIN/1.73SQ M
GLUCOSE SERPL-MCNC: 120 MG/DL (ref 65–140)
GLUCOSE SERPL-MCNC: 127 MG/DL (ref 65–140)
GLUCOSE SERPL-MCNC: 138 MG/DL (ref 65–140)
GLUCOSE SERPL-MCNC: 143 MG/DL (ref 65–140)
GLUCOSE SERPL-MCNC: 177 MG/DL (ref 65–140)
GLUCOSE SERPL-MCNC: 229 MG/DL (ref 65–140)
GLUCOSE SERPL-MCNC: 92 MG/DL (ref 65–140)
GLUCOSE UR STRIP-MCNC: NEGATIVE MG/DL
HCO3 BLDA-SCNC: 26 MMOL/L (ref 22–28)
HCT VFR BLD AUTO: 31.4 % (ref 34.8–46.1)
HCT VFR BLD AUTO: 32.6 % (ref 34.8–46.1)
HGB BLD-MCNC: 10 G/DL (ref 11.5–15.4)
HGB BLD-MCNC: 10.3 G/DL (ref 11.5–15.4)
HGB UR QL STRIP.AUTO: NEGATIVE
IRON SATN MFR SERPL: 5 % (ref 15–50)
IRON SERPL-MCNC: 16 UG/DL (ref 50–170)
KETONES UR STRIP-MCNC: NEGATIVE MG/DL
LACTATE SERPL-SCNC: 0.7 MMOL/L (ref 0.5–2)
LEUKOCYTE ESTERASE UR QL STRIP: NEGATIVE
MCH RBC QN AUTO: 28.8 PG (ref 26.8–34.3)
MCH RBC QN AUTO: 28.9 PG (ref 26.8–34.3)
MCHC RBC AUTO-ENTMCNC: 31.6 G/DL (ref 31.4–37.4)
MCHC RBC AUTO-ENTMCNC: 31.8 G/DL (ref 31.4–37.4)
MCV RBC AUTO: 91 FL (ref 82–98)
MCV RBC AUTO: 91 FL (ref 82–98)
NASAL CANNULA: 2
NITRITE UR QL STRIP: NEGATIVE
NON-SQ EPI CELLS URNS QL MICRO: ABNORMAL /HPF
O2 CT BLDA-SCNC: 14.4 ML/DL (ref 16–23)
OXYHGB MFR BLDA: 94.2 % (ref 94–97)
PCO2 BLDA: 43.7 MM HG (ref 36–44)
PCO2 TEMP ADJ BLDA: 45.9 MM HG (ref 36–44)
PH BLD: 7.38 [PH] (ref 7.35–7.45)
PH BLDA: 7.39 [PH] (ref 7.35–7.45)
PH UR STRIP.AUTO: 5 [PH]
PLATELET # BLD AUTO: 173 THOUSANDS/UL (ref 149–390)
PLATELET # BLD AUTO: 185 THOUSANDS/UL (ref 149–390)
PMV BLD AUTO: 10.8 FL (ref 8.9–12.7)
PMV BLD AUTO: 11.3 FL (ref 8.9–12.7)
PO2 BLD: 80.3 MM HG (ref 75–129)
PO2 BLDA: 74.6 MM HG (ref 75–129)
POTASSIUM SERPL-SCNC: 4.7 MMOL/L (ref 3.5–5.3)
POTASSIUM SERPL-SCNC: 5.5 MMOL/L (ref 3.5–5.3)
PROCALCITONIN SERPL-MCNC: 0.62 NG/ML
PROT SERPL-MCNC: 6.8 G/DL (ref 6.4–8.4)
PROT UR STRIP-MCNC: NEGATIVE MG/DL
PTH-INTACT SERPL-MCNC: 70.2 PG/ML (ref 12–88)
RBC # BLD AUTO: 3.47 MILLION/UL (ref 3.81–5.12)
RBC # BLD AUTO: 3.57 MILLION/UL (ref 3.81–5.12)
RBC #/AREA URNS AUTO: ABNORMAL /HPF
SODIUM SERPL-SCNC: 139 MMOL/L (ref 135–147)
SODIUM SERPL-SCNC: 140 MMOL/L (ref 135–147)
SP GR UR STRIP.AUTO: 1.01 (ref 1–1.03)
SPECIMEN SOURCE: ABNORMAL
TIBC SERPL-MCNC: 327 UG/DL (ref 250–450)
UROBILINOGEN UR QL STRIP.AUTO: 0.2 E.U./DL
WBC # BLD AUTO: 13.31 THOUSAND/UL (ref 4.31–10.16)
WBC # BLD AUTO: 16.06 THOUSAND/UL (ref 4.31–10.16)
WBC #/AREA URNS AUTO: ABNORMAL /HPF

## 2023-08-16 PROCEDURE — 71045 X-RAY EXAM CHEST 1 VIEW: CPT

## 2023-08-16 PROCEDURE — 85027 COMPLETE CBC AUTOMATED: CPT | Performed by: NURSE PRACTITIONER

## 2023-08-16 PROCEDURE — 85027 COMPLETE CBC AUTOMATED: CPT | Performed by: INTERNAL MEDICINE

## 2023-08-16 PROCEDURE — 82330 ASSAY OF CALCIUM: CPT | Performed by: NURSE PRACTITIONER

## 2023-08-16 PROCEDURE — 36600 WITHDRAWAL OF ARTERIAL BLOOD: CPT

## 2023-08-16 PROCEDURE — 83540 ASSAY OF IRON: CPT | Performed by: INTERNAL MEDICINE

## 2023-08-16 PROCEDURE — 82805 BLOOD GASES W/O2 SATURATION: CPT | Performed by: NURSE PRACTITIONER

## 2023-08-16 PROCEDURE — 81001 URINALYSIS AUTO W/SCOPE: CPT | Performed by: NURSE PRACTITIONER

## 2023-08-16 PROCEDURE — 83970 ASSAY OF PARATHORMONE: CPT | Performed by: INTERNAL MEDICINE

## 2023-08-16 PROCEDURE — 74176 CT ABD & PELVIS W/O CONTRAST: CPT

## 2023-08-16 PROCEDURE — 99291 CRITICAL CARE FIRST HOUR: CPT | Performed by: NURSE PRACTITIONER

## 2023-08-16 PROCEDURE — 87147 CULTURE TYPE IMMUNOLOGIC: CPT | Performed by: INTERNAL MEDICINE

## 2023-08-16 PROCEDURE — 71250 CT THORAX DX C-: CPT

## 2023-08-16 PROCEDURE — 97535 SELF CARE MNGMENT TRAINING: CPT

## 2023-08-16 PROCEDURE — 87147 CULTURE TYPE IMMUNOLOGIC: CPT | Performed by: NURSE PRACTITIONER

## 2023-08-16 PROCEDURE — 82948 REAGENT STRIP/BLOOD GLUCOSE: CPT

## 2023-08-16 PROCEDURE — 93005 ELECTROCARDIOGRAM TRACING: CPT

## 2023-08-16 PROCEDURE — 99292 CRITICAL CARE ADDL 30 MIN: CPT | Performed by: NURSE PRACTITIONER

## 2023-08-16 PROCEDURE — 87040 BLOOD CULTURE FOR BACTERIA: CPT | Performed by: NURSE PRACTITIONER

## 2023-08-16 PROCEDURE — 99232 SBSQ HOSP IP/OBS MODERATE 35: CPT | Performed by: INTERNAL MEDICINE

## 2023-08-16 PROCEDURE — 82728 ASSAY OF FERRITIN: CPT | Performed by: INTERNAL MEDICINE

## 2023-08-16 PROCEDURE — 94760 N-INVAS EAR/PLS OXIMETRY 1: CPT

## 2023-08-16 PROCEDURE — G1004 CDSM NDSC: HCPCS

## 2023-08-16 PROCEDURE — 99223 1ST HOSP IP/OBS HIGH 75: CPT | Performed by: INTERNAL MEDICINE

## 2023-08-16 PROCEDURE — 83550 IRON BINDING TEST: CPT | Performed by: INTERNAL MEDICINE

## 2023-08-16 PROCEDURE — 87081 CULTURE SCREEN ONLY: CPT | Performed by: INTERNAL MEDICINE

## 2023-08-16 PROCEDURE — 84145 PROCALCITONIN (PCT): CPT | Performed by: NURSE PRACTITIONER

## 2023-08-16 PROCEDURE — 80048 BASIC METABOLIC PNL TOTAL CA: CPT | Performed by: INTERNAL MEDICINE

## 2023-08-16 PROCEDURE — 83605 ASSAY OF LACTIC ACID: CPT | Performed by: NURSE PRACTITIONER

## 2023-08-16 PROCEDURE — 87081 CULTURE SCREEN ONLY: CPT | Performed by: NURSE PRACTITIONER

## 2023-08-16 PROCEDURE — 99232 SBSQ HOSP IP/OBS MODERATE 35: CPT | Performed by: NURSE PRACTITIONER

## 2023-08-16 PROCEDURE — 80053 COMPREHEN METABOLIC PANEL: CPT | Performed by: NURSE PRACTITIONER

## 2023-08-16 RX ORDER — CALCIUM GLUCONATE 20 MG/ML
1 INJECTION, SOLUTION INTRAVENOUS ONCE
Status: COMPLETED | OUTPATIENT
Start: 2023-08-16 | End: 2023-08-16

## 2023-08-16 RX ORDER — FUROSEMIDE 10 MG/ML
40 INJECTION INTRAMUSCULAR; INTRAVENOUS
Status: DISCONTINUED | OUTPATIENT
Start: 2023-08-16 | End: 2023-08-16

## 2023-08-16 RX ORDER — METRONIDAZOLE 500 MG/100ML
500 INJECTION, SOLUTION INTRAVENOUS EVERY 8 HOURS
Status: DISCONTINUED | OUTPATIENT
Start: 2023-08-16 | End: 2023-08-17

## 2023-08-16 RX ORDER — ACETAMINOPHEN 325 MG/1
650 TABLET ORAL EVERY 6 HOURS SCHEDULED
Status: DISCONTINUED | OUTPATIENT
Start: 2023-08-16 | End: 2023-08-18

## 2023-08-16 RX ORDER — ACETAMINOPHEN 325 MG/1
975 TABLET ORAL EVERY 6 HOURS PRN
Status: DISCONTINUED | OUTPATIENT
Start: 2023-08-16 | End: 2023-08-16

## 2023-08-16 RX ADMIN — FLUTICASONE PROPIONATE 1 SPRAY: 50 SPRAY, METERED NASAL at 08:43

## 2023-08-16 RX ADMIN — Medication 250 MG: at 08:35

## 2023-08-16 RX ADMIN — INSULIN LISPRO 4 UNITS: 100 INJECTION, SOLUTION INTRAVENOUS; SUBCUTANEOUS at 12:17

## 2023-08-16 RX ADMIN — CEFEPIME 2000 MG: 2 INJECTION, POWDER, FOR SOLUTION INTRAVENOUS at 18:45

## 2023-08-16 RX ADMIN — CALCIUM GLUCONATE 1 G: 20 INJECTION, SOLUTION INTRAVENOUS at 11:00

## 2023-08-16 RX ADMIN — APIXABAN 5 MG: 5 TABLET, FILM COATED ORAL at 17:49

## 2023-08-16 RX ADMIN — VANCOMYCIN HYDROCHLORIDE 2000 MG: 1 INJECTION, POWDER, LYOPHILIZED, FOR SOLUTION INTRAVENOUS at 13:45

## 2023-08-16 RX ADMIN — ACETAMINOPHEN 650 MG: 325 TABLET ORAL at 17:49

## 2023-08-16 RX ADMIN — INSULIN LISPRO 10 UNITS: 100 INJECTION, SOLUTION INTRAVENOUS; SUBCUTANEOUS at 08:35

## 2023-08-16 RX ADMIN — NYSTATIN 1 APPLICATION: 100000 POWDER TOPICAL at 17:48

## 2023-08-16 RX ADMIN — SODIUM CHLORIDE 250 ML: 0.9 INJECTION, SOLUTION INTRAVENOUS at 14:25

## 2023-08-16 RX ADMIN — INSULIN GLARGINE 25 UNITS: 100 INJECTION, SOLUTION SUBCUTANEOUS at 22:12

## 2023-08-16 RX ADMIN — PANTOPRAZOLE SODIUM 40 MG: 40 TABLET, DELAYED RELEASE ORAL at 08:36

## 2023-08-16 RX ADMIN — NYSTATIN: 100000 POWDER TOPICAL at 08:43

## 2023-08-16 RX ADMIN — SODIUM CHLORIDE 1870 ML: 0.9 INJECTION, SOLUTION INTRAVENOUS at 14:35

## 2023-08-16 RX ADMIN — METRONIDAZOLE 500 MG: 500 INJECTION, SOLUTION INTRAVENOUS at 17:46

## 2023-08-16 RX ADMIN — FLUTICASONE FUROATE AND VILANTEROL TRIFENATATE 1 PUFF: 100; 25 POWDER RESPIRATORY (INHALATION) at 08:43

## 2023-08-16 RX ADMIN — ACETAMINOPHEN 650 MG: 325 TABLET ORAL at 12:30

## 2023-08-16 RX ADMIN — MONTELUKAST 10 MG: 10 TABLET, FILM COATED ORAL at 22:12

## 2023-08-16 RX ADMIN — CEFEPIME 2000 MG: 2 INJECTION, POWDER, FOR SOLUTION INTRAVENOUS at 05:12

## 2023-08-16 RX ADMIN — ATORVASTATIN CALCIUM 20 MG: 20 TABLET, FILM COATED ORAL at 17:52

## 2023-08-16 RX ADMIN — SODIUM CHLORIDE 250 ML: 0.9 INJECTION, SOLUTION INTRAVENOUS at 13:30

## 2023-08-16 RX ADMIN — SODIUM ZIRCONIUM CYCLOSILICATE 10 G: 10 POWDER, FOR SUSPENSION ORAL at 08:33

## 2023-08-16 RX ADMIN — METOPROLOL SUCCINATE 50 MG: 50 TABLET, EXTENDED RELEASE ORAL at 08:36

## 2023-08-16 RX ADMIN — FUROSEMIDE 40 MG: 10 INJECTION, SOLUTION INTRAVENOUS at 08:35

## 2023-08-16 RX ADMIN — APIXABAN 5 MG: 5 TABLET, FILM COATED ORAL at 08:35

## 2023-08-16 RX ADMIN — INSULIN LISPRO 10 UNITS: 100 INJECTION, SOLUTION INTRAVENOUS; SUBCUTANEOUS at 12:16

## 2023-08-16 NOTE — OCCUPATIONAL THERAPY NOTE
OT TREATMENT         08/16/23 1131   Note Type   Note Type Treatment   Pain Assessment   Pain Assessment Tool Guan-Baker FACES   Guan-Baker FACES Pain Rating 0   Restrictions/Precautions   Other Precautions Chair Alarm; Bed Alarm;Cognitive; Fall Risk   ADL   Grooming Assistance 3  Moderate Assistance   Grooming Deficit Setup; Wash/dry face   Grooming Comments max verbal cues to initiate and complete task, only able to wipe mouth not able to complete whole face. Transfers   Additional Comments mobility not assess as pt unable to follow commands and with muscle spasms/tremors of arms during session, nurse aware immediately   ROM- Right Upper Extremities   R Shoulder AAROM; Flexion; Horizontal ABduction   R Elbow AAROM;Elbow flexion;Elbow extension   R Hand AAROM; Index finger; Thumb; Long finger;Ring finger;Little finger   R Weight/Reps/Sets 10 times each in bed with head raised (tremors BUE with activity)   ROM - Left Upper Extremities    L Shoulder Flexion; Horizontal ABduction;AAROM   L Elbow AAROM;Elbow flexion;Elbow extension   L Hand AAROM; Thumb; Index finger; Long finger;Ring finger;Little finger   L Weight/Reps/Sets 10 times each in bed with head raised (tremors BUE with activity)   Cognition   Overall Cognitive Status Impaired   Arousal/Participation Arousable;Lethargic   Attention Difficulty dividing attention   Orientation Level Oriented to person   Following Commands Follows one step commands inconsistently   Assessment   Assessment Patient seen for OT treatment. Patients cognition impaired today. Poor command follow, tremors/jerky movements BUE, limited ability to complete any ADLS. Patients nurse made aware via Tiger[employer] as she was off the floor at MRI. Will follow. The patient's raw score on the -PAC Daily Activity Inpatient Short Form is 8. A raw score of less than 19 suggests the patient may benefit from discharge to post-acute rehabilitation services.  Please refer to the recommendation of the Occupational Therapist for safe discharge planning. Plan   Treatment Interventions ADL retraining;UE strengthening/ROM; Endurance training; Activityengagement;Cognitive reorientation   OT Frequency 3-5x/wk   Recommendation   OT Discharge Recommendation Post acute rehabilitation services  (pending progress due to recent change in mental status)   AM-PAC Daily Activity Inpatient   Lower Body Dressing 1   Bathing 1   Toileting 1   Upper Body Dressing 1   Grooming 2   Eating 2   Daily Activity Raw Score 8   Turning Head Towards Sound 3   Follow Simple Instructions 2   Low Function Daily Activity Raw Score 13   Low Function Daily Activity Standardized Score  23.16   AM-PAC Applied Cognition Inpatient   Following a Speech/Presentation 1   Understanding Ordinary Conversation 2   Taking Medications 1   Remembering Where Things Are Placed or Put Away 1   Remembering List of 4-5 Errands 1   Taking Care of Complicated Tasks 1   Applied Cognition Raw Score 7   Applied Cognition Standardized Score 75.64   Licensure   [place] License Number  Yo Honeycutt [id number] [facility] OTR/L [id number]

## 2023-08-16 NOTE — ASSESSMENT & PLAN NOTE
· Moderate persistent asthma/KATRINA   · CPAP @ HS with 1-2L supplemental O2  · No O2 requirement during the day  · Follows with pulm outpatient   · Currently on 2LNC   · Goal O2 sat 90% or above   · Pulmonary toilet; encourage coughing and deep breathing, IS q1h while awake

## 2023-08-16 NOTE — ASSESSMENT & PLAN NOTE
· Initially held metoprolol and Demadex  · Blood pressure improved, metoprolol resumed  · Patient appeared volume overloaded with +4 pitting edema bilateral lower extremities, received one-time dose of Lasix 20 mg IV on 8/12  · demadex held, was on IV lasix; secondary to hypotension/sepsis  · Continue to monitor

## 2023-08-16 NOTE — CONSULTS
[address]  Consult  Name: Ynes Landon 72 y.o. female I MRN: [id number]  Unit/Bed#: ICU 01 I Date of Admission: 8/11/2023   Date of Service: 8/16/2023 I Hospital Day: 5    Consults    Assessment/Plan   * Septic shock ([address])  Assessment & Plan  · As evidenced by fever 101.6, tachycardia, tachypnea, and leukocytosis  · Unclear source at this time  · Initially presumed to be pneumonia  · CXR 8/11 with concern for RLL infiltrate  · CT chest 8/14 bibasilar atelectasis   · Repeat CXR today with bibasilar atelectasis vs infiltrates  · CT C/A/P to r/o occult source of infection  · Hypotension with SBP 80  · 30mL IVF of IBW ordered  · MAP goal > 65  · BP improving with IVF  · Procal elevated at 0.62 from 0.36, continue to trend   · Persistent leukocytosis (WBC 13), continue to trend daily  · ATC Tylenol for fever control   · Check MRSA nasal surveillance   · Continue cefepime, add vanco for MRSA coverage   · UA unremarkable   · Repeat BC x 2  · Blood cultures from admission 8/11 negative x 4 days   · Strep pneumo and legionella urine antigens negative  · Obtain sputum culture if able     Toxic metabolic encephalopathy  Assessment & Plan  · Secondary to fever, sepsis, and SEBASTIAN/uremia   · ATC tylenol for fever control  · IVF resuscitation/sepsis treatment as above  · Neuro checks q4h  · Delirium precautions; regulate sleep/wake cycle, environmental controls, daily CAM ICU     Acute kidney injury superimposed on chronic kidney disease [facility])  Assessment & Plan  Lab Results   Component Value Date    EGFR 17 08/16/2023    EGFR 17 08/16/2023    EGFR 15 08/15/2023    CREATININE 2.71 (H) 08/16/2023    CREATININE 2.70 (H) 08/16/2023    CREATININE 2.91 (H) 08/15/2023     · Baseline creatinine appears to be between 1.3-1.7  · Admission creatinine 1.64  · Peak creatinine 2.91, now currently 2.71  · D/C IV Lasix (received 40mg x 2 yesterday and once this AM)   · Hold home demadex   · Continue IVF resuscitation · Trend BUN/creatinine  · Avoid nephrotoxic agents  · Strict I&O  · U/S kidneys/bladder 8/15-Unremarkable kidneys without evidence of hydronephrosis    Moderate persistent asthma without complication  Assessment & Plan  · Continue home fluticasone furoate-vilanterol inhaler daily  · Continue daily singulair  · Albuterol inhaler PRN     Mixed hyperlipidemia  Assessment & Plan  · Continue home statin     Essential hypertension  Assessment & Plan  · Hold home metoprolol secondary to hypotension in setting of sepsis     Ductal carcinoma in situ (DCIS) of right breast  Assessment & Plan  · Dx March 2023  · S/p lumpectomy 5/30/2023  · Follows with Dr. Selvin Roche outpatient  · Plan for radiation to start soon     Type 2 diabetes mellitus with hyperglycemia, without long-term current use of insulin [facility])  Assessment & Plan  Lab Results   Component Value Date    HGBA1C 9.3 (H) 08/13/2023       Recent Labs     08/16/23  0149 08/16/23  0707 08/16/23  1102 08/16/23  1723   POCGLU 138 143* 229* 92       Blood Sugar Average: Last 72 hrs:  (P) [id number]     · Continue home regimen 10 units humalog with meals and 25 units lantus @ HS + SSI coverage ACHS  · Goal blood glucose less than 180     Chronic respiratory failure with hypoxia and hypercapnia (HCC)  Assessment & Plan  · Moderate persistent asthma/KATRINA   · CPAP @ HS with 1-2L supplemental O2  · No O2 requirement during the day  · Follows with pulm outpatient   · Currently on 2LNC   · Goal O2 sat 90% or above   · Pulmonary toilet; encourage coughing and deep breathing, IS q1h while awake     KATRINA (obstructive sleep apnea)  Assessment & Plan  · Continue CPAP 7 @ HS     Paroxysmal atrial fibrillation (HCC)  Assessment & Plan  · Continue home Eliquis for [facility]  · Hold metoprolol secondary to hypotension  · Optimize electrolytes  · Monitor rhythm on tele     Morbid obesity with BMI of 45.0-49.9, adult (HCC)  Assessment & Plan  · Encourage diet and lifestyle modification  · Nutrition consult          History of Present Illness     HPI: Jarrod Goodson is a 72year old male with PMH of Afib on eliquis, IDDM2, CKD, newly diagnosed breast CA, HTN, HLD, GERD, anemia, asthma and KATRINA who presented to [facility] ED on 8/11 with AMS. She was brought in by her niece who stated that she had been acting confused since that morning. Patient endorsed feeling SOB x 2 days and c/o waking up with palpitations. Upon arrival to ED she met sepsis criteria with temp 103, tachycardia, tachypnea, and leukocytosis. There was concern of RLL infiltrate on CXR, although CT chest with only findings of atelectasis. She was initially started on ceftriaxone and zithromax. Patient had one episode of hypotension in ED and was given 30mL/kg IVF based on IBW with improvement in BP. Patient also with SEBASTIAN on admission which has since worsened with creat 2.7. Critical care asked to evaluate patient today d/t worsening mentation; patient lethargic and confused, and slow to respond to questions. Also with new tremors and now in Afib with rate low 100s up to 120. Tympanic temperature check upon my arrival, found to be 101. 6. Concern for worsening sepsis. Sepsis alert activated. Had Tmax 103.7 on 8/13. Patient broadened from ceftriaxone to cefepime on 8/14. Culture data has been negative to date. Patient with episode of hypotension with SBP 80s. Concern for worsening infection. 30mL/kg IVF IBW ordered. Tylenol given for fever, patient re-cultured, and vanco added. Will transfer to critical care for further medical management. Patient's niece Nena Feeling was called by [facility] and voicemail was left. History obtained from chart review. Review of Systems   Constitutional: Positive for chills. Psychiatric/Behavioral: Positive for confusion. All other systems reviewed and are negative.         Historical Information   Past Medical History:  01/23/2023: SEBASTIAN (acute kidney injury) ([address])  No date: Anemia  No date: Asthma  No date: Atrial fibrillation (HCC)  No date: Chronic kidney disease  No date: COVID-19      Comment:  January 2022  No date: Diabetes mellitus ([address])  No date: GERD (gastroesophageal reflux disease)  No date: Hyperlipidemia  No date: Hypertension  No date: PONV (postoperative nausea and vomiting)  No date: Sleep apnea      Comment:  wear BIPAP  No date: SVT (supraventricular tachycardia) ([address]) Past Surgical History:  No date: APPENDECTOMY  No date: BREAST BIOPSY; Right      Comment:  years ago when she was 23  03/13/2023: BREAST BIOPSY; Right  5/30/2023: BREAST LUMPECTOMY; Right      Comment:  Procedure: RIGHT BREAST KRIS  DIRECTED LUMPECTOMY -               Bren Archer;  Surgeon: Rudolph Johnston MD;                Location: [facility];  Service: Surgical Oncology  07/12/2016: CYSTOSCOPY W/ LASER LITHOTRIPSY; Left      Comment:  Procedure: CYSTOSCOPY URETEROSCOPY WITH LITHOTRIPSY                HOLMIUM LASER, RETROGRADE PYELOGRAM AND INSERTION STENT                URETERAL;  Surgeon: Jaswinder Cui MD;  Location: [address] [place] [address];  Service:   No date: DILATION AND CURETTAGE OF UTERUS  03/18/2022: IR THORACENTESIS  No date: JOINT REPLACEMENT      Comment:  right knee  No date: KNEE ARTHROPLASTY; Right  4/24/2023: MAMMO NEEDLE LOCALIZATION LEFT (ALL INC) EACH ADD; Right  03/13/2023: MAMMO STEREOTACTIC BREAST BIOPSY RIGHT (ALL INC); Right      Comment:  High Risk Lesion  03/01/2022: AL ARTHROPLASTY GLENOHUMERAL JOINT TOTAL SHOULDER; Left      Comment:  Procedure: ARTHROPLASTY SHOULDER REVERSE;  Surgeon:                Coby Gutierrez MD;  Location: [facility];                 Service: Orthopedics  06/29/2016: AL CYSTO BLADDER W/URETERAL CATHETERIZATION;  Left      Comment:  Procedure: CYSTOSCOPY RETROGRADE PYELOGRAM WITH                INSERTION STENT URETERAL, left;  Surgeon: Jaswinder Cui MD;  Location: [facility];  Service: Urology  No date: SHOULDER ARTHROTOMY; Left   Current Outpatient Medications   Medication Instructions   • albuterol (PROVENTIL HFA,VENTOLIN HFA) 90 mcg/act inhaler 2 puffs, Inhalation, Every 6 hours PRN   • ammonium lactate (LAC-HYDRIN) 12 % lotion APPLY TOPICALLY TO AFFECTED AREAS twice a day   • apixaban (ELIQUIS) 5 mg, Oral, 2 times daily   • fluticasone (FLONASE) 50 mcg/act nasal spray 1 spray, Nasal, Daily   • Fluticasone-Salmeterol (Wixela Inhub) 250-50 mcg/dose inhaler 1 puff, Inhalation, 2 times daily, Rinse mouth after use. • glucose blood (OneTouch Verio) test strip 1 each, Other, 4 times daily, Use as instructed   • insulin lispro (HumaLOG KwikPen) 100 units/mL injection pen Use 10 units prior to each meal +1 unit per 50 above 150 mg/dL   • Insulin Pen Needle (BD Pen Needle Pilar 2nd Gen) 32G X 4 MM MISC For use with insulin pen. Pharmacy may dispense brand covered by insurance.    • Insulin Pen Needle (NovoFine Autocover) 30G X 8 MM MISC Subcutaneous, Daily   • Insulin Pen Needle 30G X 8 MM MISC 2 times daily   • Insulin Pen Needle 31G X 8 MM MISC Does not apply, Daily, Inject under the skin   • Lancets (onetouch ultrasoft) lancets Use once daily   • metoprolol succinate (TOPROL-XL) 50 mg, Oral, 2 times daily   • montelukast (SINGULAIR) 10 mg, Oral, Daily at bedtime   • NovoFine Autocover Pen Needle 30G X 8 MM MISC USE TWICE A DAY   • nystatin (MYCOSTATIN) powder Topical, 2 times daily   • pantoprazole (PROTONIX) 40 mg, Oral, Daily   • pregabalin (LYRICA) 100 mg capsule take 1 capsule by mouth twice a day   • rosuvastatin (CRESTOR) 10 MG tablet take 1 tablet by mouth once daily   • semaglutide (1 mg/dose) (OZEMPIC (1 MG/DOSE)) 1 mg, Subcutaneous, Every 7 days   • torsemide (DEMADEX) 20 mg tablet TAKE 2 TABLETS BY MOUTH IN THE A.M.   • Toujeo SoloStar 300 units/mL CONCENTRATED U-300 injection pen (1-unit dial) inject 25 units subcutaneously daily at bedtime   • triphrocaps 1 mg, Oral, Daily    Allergies Allergen Reactions   • Penicillins Hives   • Moxifloxacin Other (See Comments)     unknown   • Oxycodone-Acetaminophen Other (See Comments)     GI upset   • Zinc Acetate Other (See Comments)     unknown   • Penicillins Hives   • Asa [Aspirin] GI Intolerance   • Indocin [Indomethacin] Other (See Comments)     Made patient "loopy"   • Other Other (See Comments)     unknown   • Tannic Acid Rash      Social History     Tobacco Use   • Smoking status: Former     Packs/day: 1.00     Years: 20.00     Total pack years: 20.00     Types: Cigarettes     Quit date: 1996     Years since quittin.1     Passive exposure: Past   • Smokeless tobacco: Never   Vaping Use   • Vaping Use: Never used   Substance Use Topics   • Alcohol use: Not Currently     Comment: rarely   • Drug use: Never    Family History   Problem Relation Age of Onset   • Heart disease Mother    • Diabetes Father    • Thyroid cancer Sister    • Heart disease Brother    • Diabetes Brother    • Heart disease Brother    • Heart disease Brother    • Emphysema Maternal Grandmother    • Heart disease Family         Objective                            Vitals I/O      Most Recent Min/Max in 24hrs   Temp 98.9 °F (37.2 °C) Temp  Min: 98.9 °F (37.2 °C)  Max: 101.2 °F (38.4 °C)   Pulse (!) 116 Pulse  Min: 55  Max: 134   Resp 18 Resp  Min: 18  Max: 28   BP 97/51 BP  Min: 84/45  Max: 156/99   O2 Sat 96 % SpO2  Min: 92 %  Max: 98 %      Intake/Output Summary (Last 24 hours) at 2023 1911  Last data filed at 2023 1820  Gross per 24 hour   Intake 280 ml   Output 1800 ml   Net -1520 ml         Diet Ephraim/CHO Controlled; Consistent Carbohydrate Diet Level 2 (5 carb servings/75 grams CHO/meal); Potassium 2 GM     Invasive Monitoring Physical exam   N/A Physical Exam  Eyes:      General: Lids are normal.      Extraocular Movements: Extraocular movements intact. Conjunctiva/sclera: Conjunctivae normal.      Pupils: Pupils are equal, round, and reactive to light. Skin:     General: Skin is warm and dry. Capillary Refill: Capillary refill takes less than 2 seconds. Coloration: Skin is pale. HENT:      Head: Normocephalic and atraumatic. Neck:     Trachea: Trachea normal.    Cardiovascular:      Rate and Rhythm: Tachycardia present. Rhythm irregularly irregular. Pulses: Normal pulses. Radial pulses are 2+ on the right side and 2+ on the left side. Dorsalis pedis pulses are 2+ on the right side and 2+ on the left side. Heart sounds: Normal heart sounds, S1 normal and S2 normal.   Musculoskeletal:      Cervical back: Full passive range of motion without pain, normal range of motion and neck supple. Comments: Normal ROM    Abdominal:      General: Bowel sounds are normal.      Palpations: Abdomen is soft. Constitutional:       Appearance: She is morbidly obese. She is ill-appearing. Pulmonary:      Effort: Pulmonary effort is normal.      Breath sounds: Decreased breath sounds and rhonchi present. Psychiatric:         Mood and Affect: Mood is anxious. Speech: Speech is delayed. Cognition and Memory: Cognition is impaired. Memory is impaired. Neurological:      General: No focal deficit present. Mental Status: She is easily aroused. She is lethargic, disoriented and confused. GCS: GCS eye subscore is 4. GCS verbal subscore is 4. GCS motor subscore is 6.               Diagnostic Studies      EKG: Afib-rate 120  Imaging: CXR-bibasilar atelectasis I have personally reviewed pertinent films in PACS     Medications:  Scheduled PRN   acetaminophen, 650 mg, Q6H SELMA  apixaban, 5 mg, BID  atorvastatin, 20 mg, Daily With Dinner  cefepime, 2 g, Q12H  fluticasone, 1 spray, Daily  Fluticasone Furoate-Vilanterol, 1 puff, Daily  insulin glargine, 25 Units, HS  insulin lispro, 10 Units, TID With Meals  insulin lispro, 2-12 Units, TID AC  insulin lispro, 2-12 Units, HS  insulin lispro, 2-12 Units, 0200  metroNIDAZOLE, 500 mg, Q8H  montelukast, 10 mg, HS  nystatin, , BID  pantoprazole, 40 mg, Daily  saccharomyces boulardii, 250 mg, BID      ammonium lactate, 1 Application, BID PRN  ondansetron, 4 mg, Q6H PRN       Continuous          Labs:    CBC    Recent Labs     08/16/23  0455 08/16/23  1322   WBC 16.06* 13.31*   HGB 10.3* 10.0*   HCT 32.6* 31.4*    185     BMP    Recent Labs     08/16/23  0455 08/16/23  1322   SODIUM 140 139   K 5.5* 4.7    103   CO2 28 28   AGAP 8 8   BUN 65* 67*   CREATININE 2.70* 2.71*   CALCIUM 8.5 8.8       Coags    No recent results     Additional Electrolytes  Recent Labs     08/16/23  0847   CAIONIZED 1.08*          Blood Gas    Recent Labs     08/16/23  1215   PHART 7.392   EXX9RKK 43.7   PO2ART 74.6*   [id number] 26.0   BEART 0.8   SOURCE Radial, Right     Recent Labs     08/16/23  1215   SOURCE Radial, Right    LFTs  Recent Labs     08/16/23  1322   ALT 8   AST 9*   ALKPHOS 43   ALB 3.2*   TBILI 0.41       Infectious  Recent Labs     08/15/23  0432 08/16/23  1322   PROCALCITONI 0.36* 0.62*     Glucose  Recent Labs     08/15/23  0432 08/16/23  0455 08/16/23  1322   GLUC 162* 120 177*             Critical Care Time Delivered: Upon my evaluation, this patient had a high probability of imminent or life-threatening deterioration due to septic shock, which required my direct attention, intervention, and personal management. I have personally provided 80 minutes of critical care time, exclusive of procedures, teaching, family meetings, and any prior time recorded by providers other than myself.    RK Fisher

## 2023-08-16 NOTE — PROGRESS NOTES
[address]  Progress Note  Name: Lisbet Hernandez  MRN: [id number]  Unit/Bed#: 4 [place] [address]-01 I Date of Admission: 8/11/2023   Date of Service: 8/16/2023 I Hospital Day: 5    Assessment/Plan   * Septic shock ([address])  Assessment & Plan  · Septic shock evidenced by patient having known pneumonia, hypothermia, elevated creatinine, hypotension and tachycardia. · Patient initially had been placed on ceftriaxone, broadened antibiotics to cefepime on 8/15 secondary to spike in temperature. · On 8/16 patient once again spiked fever, became hypotensive and tachycardic. · Repeat blood cultures, procalcitonin, lactic acid, CMP and CBC performed. · Lactic acid negative. · Repeat chest x-ray ordered, follow-up on results. · Gave patient one-time order of IV vancomycin, ordered MRSA swab follow-up on results. · ID consulted. · Initially gave 250 cc bolus as patient has history of CHF, BP noted to drop, discussed with ICU give 30mL/kg of ideal body weight bolus  · ICU consulted, patient will be transferred to stepdown unit. · Patient aware. · Message left for patient's Sister Ce Acevedo her of pending transfer. Chronic respiratory failure with hypoxia and hypercapnia (HCC)  Assessment & Plan  · Patient wears oxygen at home at night with her CPAP but is not oxygen dependent during the day  · Keep O2 sats greater than 92%  · Currently stable on 2 L with O2 sats mid 90s.   · As noted above patient will be transferred to stepdown unit    Paroxysmal atrial fibrillation (HCC)  Assessment & Plan  · Continue Eliquis  · Continue metoprolol    Acute kidney injury superimposed on chronic kidney disease [facility])  Assessment & Plan  Lab Results   Component Value Date    EGFR 17 08/16/2023    EGFR 17 08/16/2023    EGFR 15 08/15/2023    CREATININE 2.71 (H) 08/16/2023    CREATININE 2.70 (H) 08/16/2023    CREATININE 2.91 (H) 08/15/2023   · Creatinine elevated at 2.69 > 2.91>2.71  · Bladder scan 150 mL  · Patient has had periods of hypotension  · Hold parameters for medications for SBP less than 110  · Patient had dose of IV lasix on 8/12  · Nephrology has been following; did receive one dose of IV Lasix morning of 8/16, discontinued as patient became hypotensive as noted above.   ·   · Check BNP in am  · Nephrology consulted  · Started on IV lasix 40 mg BID; currently on hold secondary to hypotension/sepsis  · Echo cardiogram shows EF 55%  · Monitor Is/Os    Essential hypertension  Assessment & Plan  · Initially held metoprolol and Demadex  · Blood pressure improved, metoprolol resumed  · Patient appeared volume overloaded with +4 pitting edema bilateral lower extremities, received one-time dose of Lasix 20 mg IV on 8/12  · demadex held, was on IV lasix; secondary to hypotension/sepsis  · Continue to monitor    Ductal carcinoma in situ (DCIS) of right breast  Assessment & Plan  · Patient had breast mass removed recently and is in the process of seeing radiation oncology  · Radiation was to start 8/16 at [facility]  · Outpatient follow-up    Morbid obesity with BMI of 45.0-49.9, adult [facility])  Assessment & Plan  · Dietary lifestyle modifications    Type 2 diabetes mellitus with hyperglycemia, without long-term current use of insulin [facility])  Assessment & Plan  Lab Results   Component Value Date    HGBA1C 9.3 (H) 08/13/2023       Recent Labs     08/15/23  2028 08/16/23  0149 08/16/23  0707 08/16/23  1102   POCGLU 149* 138 143* 229*       Blood Sugar Average: Last 72 hrs:  (P) 761.7879839271013862   · Continue 25 units of long-acting insulin at bedtime, 10 units of Humalog 3 times daily with meals and insulin sliding scale    KATRINA (obstructive sleep apnea)  Assessment & Plan  · Continue CPAP hs    Toxic metabolic encephalopathy  Assessment & Plan  · Toxic metabolic encephalopathy secondary to sepsis/pneumonia  · On morning of 8/16 patient's mentation noted to be slowed, patient reported feeling foggy  · ABG performed pH 7.392, PCO2 45.9, PO2 74.6 and bicarb 26.0  · As noted above patient receiving IV fluid bolus 30 mL/kilogram based on ideal body weight. · Patient's mentation improving with hydration. · As noted above patient will be monitored on stepdown unit               VTE Pharmacologic Prophylaxis: VTE Score: 5 High Risk (Score >/= 5) - Pharmacological DVT Prophylaxis Ordered: apixaban (Eliquis). Sequential Compression Devices Ordered. Patient Centered Rounds: I performed bedside rounds with nursing staff today. Discussions with Specialists or Other Care Team Provider: Multidisciplinary team, critical care     Education and Discussions with Family / Patient: Left message for sister Genell Lanes for update; transfer to step down unit    Total Time Spent on Date of Encounter in care of patient: 54 minutes This time was spent on one or more of the following: performing physical exam; counseling and coordination of care; obtaining or reviewing history; documenting in the medical record; reviewing/ordering tests, medications or procedures; communicating with other healthcare professionals and discussing with patient's family/caregivers. Current Length of Stay: 5 day(s)  Current Patient Status: Inpatient   Certification Statement: The patient will continue to require additional inpatient hospital stay due to septic shock, IV abx, ID consult, step down unit care  Discharge Plan: Anticipate discharge in >72 hrs to rehab facility. Code Status: Level 1 - Full Code    Subjective:   Patient seen Jacquelyn Roche in bed, appears ill. Reports that she is feeling poorly. Is not at her baseline mentation. States she feels "foggy", and not herself.      Objective:     Vitals:   Temp (24hrs), Av °F (37.8 °C), Min:97.8 °F (36.6 °C), Max:101.2 °F (38.4 °C)    Temp:  [97.8 °F (36.6 °C)-101.2 °F (38.4 °C)] 101 °F (38.3 °C)  HR:  [] 111  Resp:  [18-20] 19  BP: ()/(34-99) 91/34  SpO2:  [92 %-96 %] 95 %  Body mass index is 49.6 kg/m². Input and Output Summary (last 24 hours): Intake/Output Summary (Last 24 hours) at 8/16/2023 1518  Last data filed at 8/16/2023 1100  Gross per 24 hour   Intake 180 ml   Output 1700 ml   Net -1520 ml       Physical Exam:   Physical Exam  Vitals and nursing note reviewed. Constitutional:       Appearance: She is ill-appearing and toxic-appearing. HENT:      Head: Normocephalic. Nose: Nose normal.      Mouth/Throat:      Mouth: Mucous membranes are dry. Eyes:      Extraocular Movements: Extraocular movements intact. Conjunctiva/sclera: Conjunctivae normal.   Cardiovascular:      Rate and Rhythm: Tachycardia present. Rhythm irregular. Heart sounds: Murmur heard. Pulmonary:      Comments: Diminished bilaterally   Abdominal:      General: Bowel sounds are normal.      Palpations: Abdomen is soft. Tenderness: There is no abdominal tenderness. Genitourinary:     Comments: Purewick in place clear yellow urine   Musculoskeletal:      Right lower leg: Edema present. Left lower leg: Edema present. Skin:     Capillary Refill: Capillary refill takes less than 2 seconds. Comments: Pale, clammy   Neurological:      General: No focal deficit present. Psychiatric:      Comments: Very lethargic, does open eyes, speaks some, but appears very ill           Additional Data:     Labs:  Results from last 7 days   Lab Units 08/16/23  1322 08/16/23  0455 08/15/23  0432   WBC Thousand/uL 13.31*   < > 11.61*   HEMOGLOBIN g/dL 10.0*   < > 9.1*   HEMATOCRIT % 31.4*   < > 30.2*   PLATELETS Thousands/uL 185   < > 143*   NEUTROS PCT %  --   --  68   LYMPHS PCT %  --   --  12*   MONOS PCT %  --   --  10   EOS PCT %  --   --  8*    < > = values in this interval not displayed.      Results from last 7 days   Lab Units 08/16/23  1322   SODIUM mmol/L 139   POTASSIUM mmol/L 4.7   CHLORIDE mmol/L 103   CO2 mmol/L 28   BUN mg/dL 67*   CREATININE mg/dL 2.71*   ANION GAP mmol/L 8   CALCIUM mg/dL 8. 8   ALBUMIN g/dL 3.2*   TOTAL BILIRUBIN mg/dL 0.41   ALK PHOS U/L 43   ALT U/L 8   AST U/L 9*   GLUCOSE RANDOM mg/dL 177*     Results from last 7 days   Lab Units 08/11/23  1236   INR  1.28*     Results from last 7 days   Lab Units 08/16/23  1102 08/16/23  0707 08/16/23  0149 08/15/23  2028 08/15/23  1605 08/15/23  1113 08/15/23  0704 08/15/23  0147 08/14/23  2041 08/14/23  1557 08/14/23  1111 08/14/23  0705   POC GLUCOSE mg/dl 229* 143* 138 149* 200* 235* 158* 208* 239* 165* 230* 154*     Results from last 7 days   Lab Units 08/13/23  0446   HEMOGLOBIN A1C % 9.3*     Results from last 7 days   Lab Units 08/16/23  1322 08/15/23  0432 08/14/23  0559 08/13/23  1956 08/12/23  0512 08/11/23  1236   LACTIC ACID mmol/L 0.7  --   --  0.6  --  1.2   PROCALCITONIN ng/ml 0.62* 0.36* 0.42*  --  0.23 0.08       Lines/Drains:  Invasive Devices     Peripheral Intravenous Line  Duration           Peripheral IV 08/15/23 Proximal;Right;Ventral (anterior) Forearm <1 day          Drain  Duration           External Urinary Catheter 2 days                      Imaging: Reviewed radiology reports from this admission including: ultrasound(s) bladder and kidney     Recent Cultures (last 7 days):   Results from last 7 days   Lab Units 08/13/23  1956 08/12/23  0557 08/11/23  1308 08/11/23  1236   BLOOD CULTURE  No Growth at 48 hrs. No Growth at 48 hrs.  --   --  No Growth After 4 Days. No Growth After 4 Days.    URINE CULTURE   --   --  >100,000 cfu/ml Gardnerella vaginalis*  <10,000 cfu/ml  --    LEGIONELLA URINARY ANTIGEN   --  Negative  --   --        Last 24 Hours Medication List:   Current Facility-Administered Medications   Medication Dose Route Frequency Provider Last Rate   • acetaminophen  650 mg Oral Q6H [facility] RK Vee     • ammonium lactate  1 Application Topical BID PRN RK Vee     • apixaban  5 mg Oral BID RK Vee     • atorvastatin  20 mg Oral Daily With [facility] RK Vee     • cefepime  2 g Intravenous Q12H RK Vee 2,000 mg (08/16/23 9311)   • fluticasone  1 spray Nasal Daily RK Vee     • Fluticasone Furoate-Vilanterol  1 puff Inhalation Daily RK Vee     • insulin glargine  25 Units Subcutaneous HS RK Vee     • insulin lispro  10 Units Subcutaneous TID With Meals RK Vee     • insulin lispro  2-12 Units Subcutaneous TID AC RK Vee     • insulin lispro  2-12 Units Subcutaneous HS RK Vee     • insulin lispro  2-12 Units Subcutaneous 0200 RK Vee     • montelukast  10 mg Oral HS RK Vee     • nystatin   Topical BID RK Vee     • ondansetron  4 mg Intravenous Q6H PRN RK Vee     • pantoprazole  40 mg Oral Daily RK Vee     • saccharomyces boulardii  250 mg Oral BID RK Vee     • sodium chloride  1,870 mL Intravenous Once RK Pierre 1,870 mL (08/16/23 1435)   • [COMPLETED] sodium chloride  250 mL Intravenous Once RK Pierre 250 mL (08/16/23 1400)   • sodium chloride  250 mL Intravenous Once RK Pierre 250 mL (08/16/23 1425)   • vancomycin  25 mg/kg (Adjusted) Intravenous Once RK Pierre 2,000 mg (08/16/23 1345)        Today, Patient Was Seen By: RK Pierre    **Please Note: This note may have been constructed using a voice recognition system. **

## 2023-08-16 NOTE — ASSESSMENT & PLAN NOTE
· Septic shock evidenced by patient having known pneumonia, hypothermia, elevated creatinine, hypotension and tachycardia. · Patient initially had been placed on ceftriaxone, broadened antibiotics to cefepime on 8/15 secondary to spike in temperature. · On 8/16 patient once again spiked fever, became hypotensive and tachycardic. · Repeat blood cultures, procalcitonin, lactic acid, CMP and CBC performed. · Lactic acid negative. · Repeat chest x-ray ordered, follow-up on results. · Gave patient one-time order of IV vancomycin, ordered MRSA swab follow-up on results. · ID consulted. · Initially gave 250 cc bolus as patient has history of CHF, BP noted to drop, discussed with ICU give 30mL/kg of ideal body weight bolus  · ICU consulted, patient will be transferred to stepdown unit. · Patient aware. · Message left for patient's Sister Wale Palomo her of pending transfer.

## 2023-08-16 NOTE — QUICK NOTE
Patient seen BP dropped to 84/45 manually; will give 30ml/kg (ideal body weight 79kg) for 2,370ml total. ICU aware; will be up to see patient and re-evaluate.

## 2023-08-16 NOTE — ASSESSMENT & PLAN NOTE
· Continue home fluticasone furoate-vilanterol inhaler daily  · Continue daily singulair  · Albuterol inhaler PRN

## 2023-08-16 NOTE — ASSESSMENT & PLAN NOTE
· Toxic metabolic encephalopathy secondary to sepsis/pneumonia  · On morning of 8/16 patient's mentation noted to be slowed, patient reported feeling foggy  · ABG performed pH 7.392, PCO2 45.9, PO2 74.6 and bicarb 26.0  · As noted above patient receiving IV fluid bolus 30 mL/kilogram based on ideal body weight. · Patient's mentation improving with hydration.   · As noted above patient will be monitored on stepdown unit

## 2023-08-16 NOTE — ASSESSMENT & PLAN NOTE
Lab Results   Component Value Date    HGBA1C 9.3 (H) 08/13/2023       Recent Labs     08/15/23  2028 08/16/23  0149 08/16/23  0707 08/16/23  1102   POCGLU 149* 138 143* 229*       Blood Sugar Average: Last 72 hrs:  (P) 438.0566453992901398   · Continue 25 units of long-acting insulin at bedtime, 10 units of Humalog 3 times daily with meals and insulin sliding scale

## 2023-08-16 NOTE — ASSESSMENT & PLAN NOTE
· Secondary to fever, sepsis, and SEBASTIAN/uremia   · ATC tylenol for fever control  · IVF resuscitation/sepsis treatment as above  · Neuro checks q4h  · Delirium precautions; regulate sleep/wake cycle, environmental controls, daily CAM ICU

## 2023-08-16 NOTE — SEPSIS NOTE
Sepsis Note   Nemesio Sawant 72 y.o. female MRN: [id number]  Unit/Bed#: ICU 01 Encounter: [id number]       Initial Sepsis Screening     [address] Name 08/16/23 1305                Is the patient's history suggestive of a new or worsening infection? Yes (Proceed)  -JS        Suspected source of infection pneumonia  -JS        Indicate SIRS criteria Hyperthemia > 38.3C (100.9F) OR Hypothermia <36C (96.8F); Tachycardia > 90 bpm;Tachypnea > 20 resp per min  -JS        Are two or more of the above signs & symptoms of infection both present and new to the patient? Yes (Proceed)  -JS        Assess for evidence of organ dysfunction: Are any of the below criteria present within 6 hours of suspected infection and SIRS criteria that are NOT considered to be chronic conditions? Creatinine > 2. 0;Creatinine > 2.0 AND > 0.5 above baseline  -JS        Date of presentation of severe sepsis 08/16/23  -JS        Time of presentation of severe sepsis 1305  -JS        Sepsis Note: Click "NEXT" below (NOT "close") to generate sepsis note based on above information. YES (proceed by clicking "NEXT")  -[address] [place] [address]  (r) = Recorded By, (t) = Taken By, (c) = Cosigned By    [address] [place] [address] Name Provider Type    RK Colbert Nurse Practitioner                Default Flowsheet Data (last 720 hours)     Sepsis Reassess     Row Name 08/16/23 1700                   Repeat Volume Status and Tissue Perfusion Assessment Performed    Date of Reassessment: 08/16/23  -Tiffany Mention        Time of Reassessment: 65  -JS        Sepsis Reassessment Note: Click "NEXT" below (NOT "close") to generate sepsis reassessment note. YES (proceed by clicking "NEXT")  -        Repeat Volume Status and Tissue Perfusion Assessment Performed --              User Key  (r) = Recorded By, (t) = Taken By, (c) = Cosigned By    [address] [place] [address] Name Provider Type    RK Colbert Nurse Practitioner                Body mass index is 49.6 kg/m².   Wt Readings from Last 1 Encounters:   08/16/23 123 kg (271 lb 2.7 oz)     IBW (Ideal Body Weight): 50.1 kg    Ideal body weight: 50.1 kg (110 lb 7.2 oz)  Adjusted ideal body weight: 79.3 kg (174 lb 11.8 oz)

## 2023-08-16 NOTE — ASSESSMENT & PLAN NOTE
· Dx March 2023  · S/p lumpectomy 5/30/2023  · Follows with Dr. Julius Perry outpatient  · Plan for radiation to start soon

## 2023-08-16 NOTE — SEPSIS NOTE
Sepsis Note   Netta Leon 72 y.o. female MRN: [id number]  Unit/Bed#: [address] [place] [address] Encounter: [id number]       Initial Sepsis Screening     [address] Name 08/16/23 1305                Is the patient's history suggestive of a new or worsening infection? Yes (Proceed)  -JS        Suspected source of infection pneumonia  -JS        Indicate SIRS criteria Hyperthemia > 38.3C (100.9F) OR Hypothermia <36C (96.8F); Tachycardia > 90 bpm;Tachypnea > 20 resp per min  -JS        Are two or more of the above signs & symptoms of infection both present and new to the patient? Yes (Proceed)  -JS        Assess for evidence of organ dysfunction: Are any of the below criteria present within 6 hours of suspected infection and SIRS criteria that are NOT considered to be chronic conditions? Creatinine > 2. 0;Creatinine > 2.0 AND > 0.5 above baseline  -JS        Date of presentation of severe sepsis 08/16/23  -JS        Time of presentation of severe sepsis 1305  -JS        Sepsis Note: Click "NEXT" below (NOT "close") to generate sepsis note based on above information. YES (proceed by clicking "NEXT")  -[address]  (r) = Recorded By, (t) = Taken By, (c) = Cosigned By    [address] [place] [address] Name Provider Type    RK Godfrey Nurse Practitioner                    Body mass index is 49.6 kg/m².   Wt Readings from Last 1 Encounters:   08/16/23 123 kg (271 lb 2.7 oz)     IBW (Ideal Body Weight): 50.1 kg    Ideal body weight: 50.1 kg (110 lb 7.2 oz)  Adjusted ideal body weight: 79.3 kg (174 lb 11.8 oz)

## 2023-08-16 NOTE — ASSESSMENT & PLAN NOTE
· Patient had breast mass removed recently and is in the process of seeing radiation oncology  · Radiation was to start 8/16 at [facility]  · Outpatient follow-up

## 2023-08-16 NOTE — ASSESSMENT & PLAN NOTE
· As evidenced by fever 101.6, tachycardia, tachypnea, and leukocytosis  · Unclear source at this time  · Initially presumed to be pneumonia  · CXR 8/11 with concern for RLL infiltrate  · CT chest 8/14 bibasilar atelectasis   · Repeat CXR today with bibasilar atelectasis vs infiltrates  · CT C/A/P to r/o occult source of infection  · Hypotension with SBP 80  · 30mL IVF of IBW ordered  · MAP goal > 65  · BP improving with IVF  · Procal elevated at 0.62 from 0.36, continue to trend   · Persistent leukocytosis (WBC 13), continue to trend daily  · ATC Tylenol for fever control   · Check MRSA nasal surveillance   · Continue cefepime, add vanco for MRSA coverage   · UA unremarkable   · Repeat BC x 2  · Blood cultures from admission 8/11 negative x 4 days   · Strep pneumo and legionella urine antigens negative  · Obtain sputum culture if able

## 2023-08-16 NOTE — ASSESSMENT & PLAN NOTE
· Continue home Eliquis for [facility]  · Hold metoprolol secondary to hypotension  · Optimize electrolytes  · Monitor rhythm on tele

## 2023-08-16 NOTE — QUICK NOTE
Patient seen, lethargic, not at her baseline. Sepsis alert initiated by nursing. ABGs performed pH 7.32, PCO2 45.9, PO2 74.6 with bicarb 26.0. O2 sats 95% O2 2 L. ICU at bedside evaluating, will get procalcitonin, lactic acid, CMP, CBC, repeat blood cultures, repeat chest x-ray; will give patient fluid bolus 250cc as patient as a history of CHF, EF 55% on recent echo. Does appear volume overloaded.

## 2023-08-16 NOTE — ASSESSMENT & PLAN NOTE
Lab Results   Component Value Date    HGBA1C 9.3 (H) 08/13/2023       Recent Labs     08/16/23  0149 08/16/23  0707 08/16/23  1102 08/16/23  1723   POCGLU 138 143* 229* 92       Blood Sugar Average: Last 72 hrs:  (P) 173.6895624119404969     · Continue home regimen 10 units humalog with meals and 25 units lantus @ HS + SSI coverage ACHS  · Goal blood glucose less than 180

## 2023-08-16 NOTE — ASSESSMENT & PLAN NOTE
Lab Results   Component Value Date    EGFR 17 08/16/2023    EGFR 17 08/16/2023    EGFR 15 08/15/2023    CREATININE 2.71 (H) 08/16/2023    CREATININE 2.70 (H) 08/16/2023    CREATININE 2.91 (H) 08/15/2023     · Baseline creatinine appears to be between 1.3-1.7  · Admission creatinine 1.64  · Peak creatinine 2.91, now currently 2.71  · D/C IV Lasix (received 40mg x 2 yesterday and once this AM)   · Hold home demadex   · Continue IVF resuscitation   · Trend BUN/creatinine  · Avoid nephrotoxic agents  · Strict I&O  · U/S kidneys/bladder 8/15-Unremarkable kidneys without evidence of hydronephrosis

## 2023-08-16 NOTE — CASE MANAGEMENT
Case Management Progress Note    Patient name Josué Jose [phone] MRN [id number]  : 1950 Date 2023       LOS (days): 5  Geometric Mean LOS (GMLOS) (days): 5.00  Days to GMLOS:0.2        OBJECTIVE:     Current admission status: Inpatient  Preferred Pharmacy:   [address] [place] [address] #[id number] [facility], [address] ([address])  [address] [facility] ([address])  [address] [address]  Phone: [phone] Fax: [phone]    Primary Care Provider: Radha Justice MD    Primary Insurance: MEDICARE  Secondary Insurance: [employer]    PROGRESS NOTE:    SW met bedside with patient to inquire on choice for STR facility. Patient states she is feeling very sick this morning and has not been able to consider facility choice. SW encouraged patient to think about choice as soon as she can so that a bed can be reserved at the facility she wants. Pt gave verbal understanding.

## 2023-08-16 NOTE — ASSESSMENT & PLAN NOTE
Lab Results   Component Value Date    EGFR 17 08/16/2023    EGFR 17 08/16/2023    EGFR 15 08/15/2023    CREATININE 2.71 (H) 08/16/2023    CREATININE 2.70 (H) 08/16/2023    CREATININE 2.91 (H) 08/15/2023   · Creatinine elevated at 2.69 > 2.91>2.71  · Bladder scan 150 mL  · Patient has had periods of hypotension  · Hold parameters for medications for SBP less than 110  · Patient had dose of IV lasix on 8/12  · Nephrology has been following; did receive one dose of IV Lasix morning of 8/16, discontinued as patient became hypotensive as noted above.   ·   · Check BNP in am  · Nephrology consulted  · Started on IV lasix 40 mg BID; currently on hold secondary to hypotension/sepsis  · Echo cardiogram shows EF 55%  · Monitor Is/Os

## 2023-08-16 NOTE — PROGRESS NOTES
Am assessment around 8am, observed the pateint twitching and weak as compared to yesterday. Unable to stand for standing weight, Nephrology requested standing weight. Called NP (SWAPNIL) ordrs obtained. The patient seemed improving and ate 25% breakfast and 50% lunch. Around 1300, observed the patient breathing heavy, vitals abnormal entered in Epic. BP dropped to 84/45 manually;  NS 2,370ml IV fluid ordered   NP and ICU at bed side for increasing  Lethargy  Sepsis alert initiated by at 1300. New orderes obtained. All the labs done as ordered. IV abt, boluses administered as ordered. C xray, CT abd/pelvis done as ordered. Transferred to ICU and nurse to nurse report provided.   Family was notified by NP

## 2023-08-16 NOTE — PROGRESS NOTES
[address] NOTE   Mateus Smith 72 y.o. female MRN: [id number]  Unit/Bed#: [address] [place] [address] Encounter: [id number]  Reason for Consult: SEBASTIAN on CKD    ASSESSMENT and PLAN:  53-year-old female with history of CKD admitted with sepsis secondary to pneumonia. We are consulted for management of SEBASTIAN on CKD. 1. Acute kidney injury. Etiology of acute kidney injury most likely ATN in setting of infection +/- episodes of hypotension and contribution of cardiorenal syndrome. Etiology of CKD most likely hypertension, diabetes, cardiorenal syndrome. Creatinine at baseline is 1.5-1.8, creatinine at presentation 1.64, peak creatinine 2.9 on 08/15, creatinine today 2.7. Urinalysis showed trace protein, 0-1 RBC, 4-10 WBC. UACR 55 mg/g 03/2023. Kidney ultrasound did not show hydronephrosis. She was given a trial of IV Lasix 40 mg x 208/15 which led to improvement of serum creatinine to 2.7 today. This signifies that there is a component of cardiorenal syndrome. Echocardiogram has been performed and LVEF is 55% with normal diastolic function. Repeat IV Lasix 40 mg x 2 today for ongoing volume overload. No indication for renal replacement therapy. Trend BMP. 2. Hypertension/volume. She has history of diastolic CHF and follows with heart failure service. Home medications include Toprol-XL 50 mg twice daily and torsemide 40 mg daily. CT chest on admission consistent with mild septal thickening suggestive of interstitial edema. Cardiac BNP elevated at 213. Continue IV diuretics as stated above. 3. Anemia of CKD. Baseline hemoglobin is around 10-11. Currently hemoglobin 10.3. Follow iron studies including ferritin to rule out absolute or functional iron deficiency. No indication for erythropoietin stimulating agents. 4. Hyperkalemia. Most likely in setting of SEBASTIAN on CKD. Low potassium diet. Continue diuretics for kaliuresis. Give Lokelma 10 g x 1 today. 5. Bone mineral disease.   Serum calcium improved to 8.5 today. Follow PTH. If PTH is significantly elevated, will add low-dose calcitriol. 6. Pneumonia. Currently on intravenous cefepime per primary medicine team.    7. New onset twitching. No hypocalcemia currently. May be related to Lyrica in setting of decreased renal function. Hold Lyrica today to see if symptoms improve. Once resuming, would recommend resuming at a lower dosage of 75 mg daily. Discussed with internal medicine team.  After discussion, we agreed that there has been some improvement in kidney function with diuresis and to continue diuresis today as above. SUBJECTIVE / 24H INTERVAL HISTORY:  Urine output recorded as 1500 cc. Bed weight down to 271 from 272 pounds. She is having intermittent twitching this morning. Complains of dyspnea. Review of Systems   Constitutional: Negative for chills and fever. HENT: Negative for ear pain and sore throat. Eyes: Negative for pain and visual disturbance. Respiratory: Positive for shortness of breath. Negative for cough. Cardiovascular: Negative for chest pain and palpitations. Gastrointestinal: Negative for abdominal pain and vomiting. Genitourinary: Negative for dysuria and hematuria. Musculoskeletal: Negative for arthralgias and back pain. Skin: Negative for color change and rash. Neurological: Negative for seizures and syncope. All other systems reviewed and are negative. OBJECTIVE:  Current Weight: Weight - Scale: 123 kg (271 lb 2.7 oz)  Vitals:    08/15/23 2300 08/16/23 0300 08/16/23 0500 08/16/23 0600   BP:       BP Location:       Pulse:  83 85 88   Resp:       Temp:  98.9 °F (37.2 °C)     TempSrc:       SpO2: 94% 92% 93% 92%   Weight:    123 kg (271 lb 2.7 oz)   Height:           Intake/Output Summary (Last 24 hours) at 8/16/2023 0802  Last data filed at 8/15/2023 2201  Gross per 24 hour   Intake 660 ml   Output 1500 ml   Net -840 ml     Physical Exam  Vitals and nursing note reviewed. Constitutional:       General: She is not in acute distress. Appearance: She is well-developed. HENT:      Head: Normocephalic and atraumatic. Eyes:      Conjunctiva/sclera: Conjunctivae normal.   Cardiovascular:      Rate and Rhythm: Normal rate and regular rhythm. Heart sounds: No murmur heard. Pulmonary:      Effort: Pulmonary effort is normal. No respiratory distress. Comments: Diminished breath sounds bilaterally at bases  Abdominal:      Palpations: Abdomen is soft. Tenderness: There is no abdominal tenderness. Musculoskeletal:         General: No swelling. Cervical back: Neck supple. Right lower leg: Edema present. Left lower leg: Edema present. Skin:     General: Skin is warm and dry. Capillary Refill: Capillary refill takes less than 2 seconds. Neurological:      Mental Status: She is alert.    Psychiatric:         Mood and Affect: Mood normal.       Medications:    Current Facility-Administered Medications:   •  acetaminophen (TYLENOL) tablet 650 mg, 650 mg, Oral, Q6H PRN, Yvonnie Seat, CRNP, 650 mg at 08/15/23 1855  •  ammonium lactate (LAC-HYDRIN) 12 % lotion 1 Application, 1 Application, Topical, BID PRN, Kenny Teja, CRNP, 1 Application at 14/87/02 9163  •  apixaban (ELIQUIS) tablet 5 mg, 5 mg, Oral, BID, Yvonnie Seat, CRNP, 5 mg at 08/15/23 1701  •  atorvastatin (LIPITOR) tablet 20 mg, 20 mg, Oral, Daily With Dinner, Yvonnie Seat, CRNP, 20 mg at 08/15/23 1701  •  cefepime (MAXIPIME) 2 g/50 mL dextrose IVPB, 2 g, Intravenous, Q12H, Cristal Pennington, DO, Last Rate: 100 mL/hr at 08/16/23 0512, 2,000 mg at 08/16/23 0512  •  fluticasone (FLONASE) 50 mcg/act nasal spray 1 spray, 1 spray, Nasal, Daily, MARLENE LovelaceNP, 1 spray at 08/15/23 0847  •  Fluticasone Furoate-Vilanterol 100-25 mcg/actuation 1 puff, 1 puff, Inhalation, Daily, MARLENE LovelaceNP, 1 puff at 08/15/23 0801  •  furosemide (LASIX) injection 40 mg, 40 mg, Intravenous, BID (diuretic), Aliza Guy MD  •  insulin glargine (LANTUS) subcutaneous injection 25 Units 0.25 mL, 25 Units, Subcutaneous, HS, RK Burrell, 25 Units at 08/15/23 2110  •  insulin lispro (HumaLOG) 100 units/mL subcutaneous injection 10 Units, 10 Units, Subcutaneous, TID With Meals, RK Burrell, 10 Units at 08/15/23 1657  •  insulin lispro (HumaLOG) 100 units/mL subcutaneous injection 2-12 Units, 2-12 Units, Subcutaneous, TID AC, 4 Units at 08/15/23 1657 **AND** Fingerstick Glucose (POCT), , , TID AC, RK Burrell  •  insulin lispro (HumaLOG) 100 units/mL subcutaneous injection 2-12 Units, 2-12 Units, Subcutaneous, HS, RK Burrell, 4 Units at 08/14/23 2126  •  insulin lispro (HumaLOG) 100 units/mL subcutaneous injection 2-12 Units, 2-12 Units, Subcutaneous, 0200, RK Burrell, 2 Units at 08/15/23 0222  •  metoprolol succinate (TOPROL-XL) 24 hr tablet 50 mg, 50 mg, Oral, BID, RK Kelly, 50 mg at 08/15/23 1701  •  montelukast (SINGULAIR) tablet 10 mg, 10 mg, Oral, HS, RK Burrell, 10 mg at 08/15/23 2110  •  nystatin (MYCOSTATIN) powder, , Topical, BID, RK Kelly, Given at 08/15/23 1702  •  ondansetron (ZOFRAN) injection 4 mg, 4 mg, Intravenous, Q6H PRN, Cristal Pennington, DO, 4 mg at 08/13/23 1752  •  pantoprazole (PROTONIX) EC tablet 40 mg, 40 mg, Oral, Daily, RK Burrell, 40 mg at 08/15/23 0845  •  pregabalin (LYRICA) capsule 100 mg, 100 mg, Oral, BID, RK Burrell, 100 mg at 08/15/23 1701  •  saccharomyces boulardii (FLORASTOR) capsule 250 mg, 250 mg, Oral, BID, RK Kelly, 250 mg at 08/15/23 1701  •  Sodium Zirconium Cyclosilicate (Lokelma) 10 g, 10 g, Oral, Once, Aliza Guy MD    Laboratory Results:  Results from last 7 days   Lab Units 08/16/23  0455 08/15/23  0432 08/14/23  0559 08/13/23  0446 08/12/23  1158 08/12/23  0512 08/11/23  1236   WBC Thousand/uL 16.06* 11.61* 14.02* 14.04* 15.48*  --  16.88*   HEMOGLOBIN g/dL 10.3* 9.1* 9.6* 10.1* 10.9*  --  11.9   HEMATOCRIT % 32.6* 30.2* 31.8* 33.0* 36.0  --  37.6   PLATELETS Thousands/uL 173 143* 142* 133* 153  --  171   POTASSIUM mmol/L 5.5* 4.6 4.5 4.2  --  4.5 4.5   CHLORIDE mmol/L 104 102 101 104  --  104 97   CO2 mmol/L 28 28 27 29  --  24 33*   BUN mg/dL 65* 58* 47* 37*  --  36* 35*   CREATININE mg/dL 2.70* 2.91* 2.69* 2.01*  --  1.82* 1.64*   CALCIUM mg/dL 8.5 7.9* 8.2* 7.9*  --  8.3* 9.0     Portions of the record may have been created with voice recognition software. Occasional wrong word or "sound a like" substitutions may have occurred due to the inherent limitations of voice recognition software. Read the chart carefully and recognize, using context, where substitutions have occurred. If you have any questions, please contact the dictating provider.

## 2023-08-16 NOTE — PHYSICAL THERAPY NOTE
Attempted PT treatment however pt declined. Pt reports she does not feel well today. Will follow.   Unique Regan PT  [id number]     08/16/23 1031   Note Type   Note Type Cancelled Session   Cancel Reasons Refusal

## 2023-08-16 NOTE — CONSULTS
Consultation - Infectious Disease   Sherrell Haney 72 y.o. female MRN: [id number]  Unit/Bed#: [address] [place] [address] Encounter: [id number]      IMPRESSION & RECOMMENDATIONS:   1. Severe sepsis, presenting with fever of 103 F, tachycardia, tachypnea and leukocytosis and now hypotension worsening. Initially presumed pneumonia. Continues to spike fever and now hypotension despite 6 days of IV cephalosporins. Blood cultures x 6, urinary antigens, RSV/FLU/COVID19 screen negative. 8/14/23 CT chest no definite pneumonia. procal 0.62. unclear source, possible aspiration  -continues Cefepime 2g IV q 12 hours at present  -add Vancomycin IV renal dosed by pharmacy and Flagyl  mg q 8 hours  -follow up MRSA  -follow-up cultures and adjust antibiotics as needed  -monitor temperature and hemodynamics  -serial exam  -recheck CBC and BMP in a.m.  -follow CT C/A/P  -check swallow evaluation  -aspiration precautions    2. Encephalopathy. With increased lethargy/altered mental status. Consider aspiration. Consider medication side effect. -Management/work-up as above  -check swallow evaluation  -aspiration precautions  -Consider CT head imaging     3. SEBASTIAN on CKD. Peak creatinine 2.91. Baseline 1.7  -renal dose adjust antibiotic as needed  -volume management per nephrology  -Vancomycin dosing per pharmacy  -recheck BMP    4. Type 2 diabetes mellitus. Hemoglobin A1c on 8/13/2023 9.3. Risk factor for infection  -Blood glucose management per primary care team    5. Morbid Obesity. BMI 49.6. Can affect antibiotic absorption/dosing    6. 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 [facility]. Inframammary bilateral intertrigo with powder applied.  -serial exam    Antibiotics:  Cefepime D3    I have discussed the above management plan in detail with patient, RN, and Mary Kay Norton of the primary service.   I have spent a total time of 80 minutes on 08/16/23 in caring for this patient including Diagnostic results, Prognosis, Risks and benefits of tx options, Instructions for management, Patient and family education, Importance of tx compliance, Risk factor reductions, Impressions, Counseling / Coordination of care, Documenting in the medical record, Reviewing / ordering tests, medicine, procedures  , Obtaining or reviewing history   and Communicating with other healthcare professionals . Extensive review of the medical records in epic including review of the notes, radiographs, and laboratory results     HISTORY OF PRESENT ILLNESS:  Reason for Consult: 1. Pneumonia 2. Sepsis    HPI: Cal Wilson is a 72y.o. year old female with atrial fibrillation, diabetes mellitus, chronic kidney disease, newly diagnosed breast cancer, obstructive sleep apnea who presented to the [facility] ER 8/11/23 with altered mental status. Patient was brought in by her niece because she was acting confused that morning. Patient reported she had been feeling short of breath for 2 days and woke up with palpitations day of admission. On arrival to the emergency department patient was found to have a fever of 103 F, tachycardia, tachypnea and leukocytosis. Chest x-ray revealed a right lower lobe infiltrate. Patient had an episode of hypotension responsive to IVF. Patient was admitted on IV Ceftriaxone/azithromycin. She then spiked fevers to 103.7 on 8/13/23, and patient was escalated to Cefepime starting 8/14/23. She is now febrile to 101 with tachycardia and hypotension. Infectious Disease is now being consulted regarding evaluation and management of pneumonia and sepsis. Patient moved to SDU. Patient drowsy, arousable to name and answers questions appropriately. She denies pain anywhere. Reports getting better. No dysuria, no N/V/D, chest pain, cough. Some discomfort at lumpectomy site. REVIEW OF SYSTEMS:  A complete review of systems is negative other than that noted in the HPI.     PAST MEDICAL HISTORY:  Past Medical History:   Diagnosis Date   • SEBASTIAN (acute kidney injury) ([address]) 01/23/2023   • Anemia    • Asthma    • Atrial fibrillation ([address])    • Chronic kidney disease    • COVID-19 January 2022   • Diabetes mellitus ([address])    • GERD (gastroesophageal reflux disease)    • Hyperlipidemia    • Hypertension    • PONV (postoperative nausea and vomiting)    • Sleep apnea     wear BIPAP   • SVT (supraventricular tachycardia) ([address])      Past Surgical History:   Procedure Laterality Date   • APPENDECTOMY     • BREAST BIOPSY Right     years ago when she was 23   • BREAST BIOPSY Right 03/13/2023   • BREAST LUMPECTOMY Right 5/30/2023    Procedure: RIGHT BREAST KRIS  DIRECTED LUMPECTOMY - Yobani Bautista;  Surgeon: Santino Sears MD;  Location: [facility];  Service: Surgical Oncology   • CYSTOSCOPY W/ LASER LITHOTRIPSY Left 07/12/2016    Procedure: CYSTOSCOPY URETEROSCOPY WITH LITHOTRIPSY HOLMIUM LASER, RETROGRADE PYELOGRAM AND INSERTION STENT URETERAL;  Surgeon: Tammy Fuentes MD;  Location: [facility];  Service:    • DILATION AND CURETTAGE OF UTERUS     • IR THORACENTESIS  03/18/2022   • JOINT REPLACEMENT      right knee   • KNEE ARTHROPLASTY Right    • MAMMO NEEDLE LOCALIZATION LEFT (ALL INC) EACH ADD Right 4/24/2023   • MAMMO STEREOTACTIC BREAST BIOPSY RIGHT (ALL INC) Right 03/13/2023    High Risk Lesion   • NV ARTHROPLASTY GLENOHUMERAL JOINT TOTAL SHOULDER Left 03/01/2022    Procedure: ARTHROPLASTY SHOULDER REVERSE;  Surgeon: Les Shannon MD;  Location: [facility];  Service: Orthopedics   • NV CYSTO BLADDER W/URETERAL CATHETERIZATION Left 06/29/2016    Procedure: CYSTOSCOPY RETROGRADE PYELOGRAM WITH INSERTION STENT URETERAL, left;  Surgeon: Tammy Fuentes MD;  Location: [facility];  Service: Urology   • SHOULDER ARTHROTOMY Left        FAMILY HISTORY:  Non-contributory    SOCIAL HISTORY:  Social History   Social History     Substance and Sexual Activity   Alcohol Use Not Currently Comment: rarely     Social History     Substance and Sexual Activity   Drug Use Never     Social History     Tobacco Use   Smoking Status Former   • Packs/day: 1.00   • Years: 20.00   • Total pack years: 20.00   • Types: Cigarettes   • Quit date: 1996   • Years since quittin.1   • Passive exposure: Past   Smokeless Tobacco Never       ALLERGIES:  Allergies   Allergen Reactions   • Penicillins Hives   • Moxifloxacin Other (See Comments)     unknown   • Oxycodone-Acetaminophen Other (See Comments)     GI upset   • Zinc Acetate Other (See Comments)     unknown   • Penicillins Hives   • Asa [Aspirin] GI Intolerance   • Indocin [Indomethacin] Other (See Comments)     Made patient "loopy"   • Other Other (See Comments)     unknown   • Tannic Acid Rash       MEDICATIONS:  All current active medications have been reviewed. PHYSICAL EXAM:  Temp:  [97.8 °F (36.6 °C)-101.2 °F (38.4 °C)] 99.9 °F (37.7 °C)  HR:  [] 100  Resp:  [18-20] 19  BP: ()/(34-99) 110/57  SpO2:  [92 %-96 %] 95 %  Temp (24hrs), Av °F (37.8 °C), Min:97.8 °F (36.6 °C), Max:101.2 °F (38.4 °C)  Current: Temperature: 99.9 °F (37.7 °C)    Intake/Output Summary (Last 24 hours) at 2023 1610  Last data filed at 2023 1100  Gross per 24 hour   Intake 180 ml   Output 1700 ml   Net -1520 ml       General Appearance:  72year old female, propped in ICU bed, drowsy, arousable to exam, name, in no acute distress   Head:  Normocephalic, without obvious abnormality, atraumatic   Eyes:  Conjunctiva pink and sclera anicteric, both eyes, keeps eyes closed.    Nose: Nares normal, mucosa normal, no drainage   Throat: Oropharynx moist without lesions   Neck: Supple, symmetrical, no adenopathy, no tenderness/mass/nodules   Back:   Symmetric, no curvature, ROM normal, no CVA tenderness   Lungs:   Decreased breath sounds fairly clear to auscultation bilaterally, respirations unlabored on 2L NC O2 statting 96%, + short statements   Chest Wall:  No tenderness or deformity   Heart:  Tachy, decreased tones   Abdomen:   Soft, non-tender, protuberant pannus, positive bowel sounds    Extremities: No cyanosis, clubbing or edema   :  no oneal, no SPT   Skin: No rashes or lesions. No draining wounds noted. Wound photos in media section reviewed. Lymph nodes: Cervical, supraclavicular nodes normal   Neurologic: Alert and oriented times 3, can moves fingers and toes bilaterally. LABS, IMAGING, & OTHER STUDIES:  Lab Results:  I have personally reviewed pertinent labs. Results from last 7 days   Lab Units 08/16/23  1322 08/16/23 0455 08/15/23  0432   WBC Thousand/uL 13.31* 16.06* 11.61*   HEMOGLOBIN g/dL 10.0* 10.3* 9.1*   PLATELETS Thousands/uL 185 173 143*     Results from last 7 days   Lab Units 08/16/23  1322 08/16/23 0455 08/15/23  0432 08/12/23  0512 08/11/23  1236   SODIUM mmol/L 139 140 136   < > 137   POTASSIUM mmol/L 4.7 5.5* 4.6   < > 4.5   CHLORIDE mmol/L 103 104 102   < > 97   CO2 mmol/L 28 28 28   < > 33*   BUN mg/dL 67* 65* 58*   < > 35*   CREATININE mg/dL 2.71* 2.70* 2.91*   < > 1.64*   EGFR ml/min/1.73sq m 17 17 15   < > 31   CALCIUM mg/dL 8.8 8.5 7.9*   < > 9.0   AST U/L 9*  --   --   --  15   ALT U/L 8  --   --   --  12   ALK PHOS U/L 43  --   --   --  54    < > = values in this interval not displayed. Results from last 7 days   Lab Units 08/13/23 1956 08/12/23  0557 08/11/23  1308 08/11/23  1236   BLOOD CULTURE  No Growth at 48 hrs. No Growth at 48 hrs.  --   --  No Growth After 4 Days. No Growth After 4 Days.    URINE CULTURE   --   --  >100,000 cfu/ml Gardnerella vaginalis*  <10,000 cfu/ml  --    LEGIONELLA URINARY ANTIGEN   --  Negative  --   --      Results from last 7 days   Lab Units 08/16/23  1322 08/15/23  0432 08/14/23  0559 08/12/23  0512 08/11/23  1236   PROCALCITONIN ng/ml 0.62* 0.36* 0.42* 0.23 0.08         Results from last 7 days   Lab Units 08/16/23  0455   FERRITIN ng/mL 168           Imaging Studies:   Imaging study reports and images in PACS reviewed personally. 8/14/23 CT chest atelectasis lower lobes  8/11/23 PCXR: right base airspace opacity  Other Studies:   I have personally reviewed pertinent reports.

## 2023-08-16 NOTE — ASSESSMENT & PLAN NOTE
· Patient wears oxygen at home at night with her CPAP but is not oxygen dependent during the day  · Keep O2 sats greater than 92%  · Currently stable on 2 L with O2 sats mid 90s.   · As noted above patient will be transferred to stepdown unit

## 2023-08-17 ENCOUNTER — APPOINTMENT (INPATIENT)
Dept: RADIOLOGY | Facility: HOSPITAL | Age: 73
DRG: 853 | End: 2023-08-17
Payer: MEDICARE

## 2023-08-17 ENCOUNTER — APPOINTMENT (INPATIENT)
Dept: NON INVASIVE DIAGNOSTICS | Facility: HOSPITAL | Age: 73
DRG: 853 | End: 2023-08-17
Payer: MEDICARE

## 2023-08-17 ENCOUNTER — APPOINTMENT (INPATIENT)
Dept: NEUROLOGY | Facility: HOSPITAL | Age: 73
DRG: 853 | End: 2023-08-17
Payer: MEDICARE

## 2023-08-17 LAB
ANION GAP SERPL CALCULATED.3IONS-SCNC: 7 MMOL/L
AORTIC ROOT: 3.4 CM
APICAL FOUR CHAMBER EJECTION FRACTION: 61 %
BASOPHILS # BLD AUTO: 0.11 THOUSANDS/ÂΜL (ref 0–0.1)
BASOPHILS NFR BLD AUTO: 1 % (ref 0–1)
BUN SERPL-MCNC: 65 MG/DL (ref 5–25)
CALCIUM SERPL-MCNC: 8.6 MG/DL (ref 8.4–10.2)
CHLORIDE SERPL-SCNC: 106 MMOL/L (ref 96–108)
CO2 SERPL-SCNC: 26 MMOL/L (ref 21–32)
CREAT SERPL-MCNC: 2.39 MG/DL (ref 0.6–1.3)
E WAVE DECELERATION TIME: 163 MS
EOSINOPHIL # BLD AUTO: 0.35 THOUSAND/ÂΜL (ref 0–0.61)
EOSINOPHIL NFR BLD AUTO: 2 % (ref 0–6)
ERYTHROCYTE [DISTWIDTH] IN BLOOD BY AUTOMATED COUNT: 15.9 % (ref 11.6–15.1)
FRACTIONAL SHORTENING: 35 % (ref 28–44)
GFR SERPL CREATININE-BSD FRML MDRD: 19 ML/MIN/1.73SQ M
GLUCOSE SERPL-MCNC: 125 MG/DL (ref 65–140)
GLUCOSE SERPL-MCNC: 133 MG/DL (ref 65–140)
GLUCOSE SERPL-MCNC: 140 MG/DL (ref 65–140)
GLUCOSE SERPL-MCNC: 140 MG/DL (ref 65–140)
GLUCOSE SERPL-MCNC: 184 MG/DL (ref 65–140)
GLUCOSE SERPL-MCNC: 205 MG/DL (ref 65–140)
GLUCOSE SERPL-MCNC: 221 MG/DL (ref 65–140)
HCT VFR BLD AUTO: 32.1 % (ref 34.8–46.1)
HGB BLD-MCNC: 10.1 G/DL (ref 11.5–15.4)
IMM GRANULOCYTES # BLD AUTO: 0.13 THOUSAND/UL (ref 0–0.2)
IMM GRANULOCYTES NFR BLD AUTO: 1 % (ref 0–2)
INTERVENTRICULAR SEPTUM IN DIASTOLE (PARASTERNAL SHORT AXIS VIEW): 1.1 CM
INTERVENTRICULAR SEPTUM: 1.1 CM (ref 0.6–1.1)
LAAS-AP2: 14.2 CM2
LAAS-AP4: 17.5 CM2
LEFT ATRIUM SIZE: 3.4 CM
LEFT ATRIUM VOLUME (MOD BIPLANE): 51 ML
LEFT INTERNAL DIMENSION IN SYSTOLE: 2.6 CM (ref 2.1–4)
LEFT VENTRICULAR INTERNAL DIMENSION IN DIASTOLE: 4 CM (ref 3.5–6)
LEFT VENTRICULAR POSTERIOR WALL IN END DIASTOLE: 1.3 CM
LEFT VENTRICULAR STROKE VOLUME: 48 ML
LVSV (TEICH): 48 ML
LYMPHOCYTES # BLD AUTO: 1.24 THOUSANDS/ÂΜL (ref 0.6–4.47)
LYMPHOCYTES NFR BLD AUTO: 8 % (ref 14–44)
MAGNESIUM SERPL-MCNC: 2 MG/DL (ref 1.9–2.7)
MCH RBC QN AUTO: 28.9 PG (ref 26.8–34.3)
MCHC RBC AUTO-ENTMCNC: 31.5 G/DL (ref 31.4–37.4)
MCV RBC AUTO: 92 FL (ref 82–98)
MONOCYTES # BLD AUTO: 1.92 THOUSAND/ÂΜL (ref 0.17–1.22)
MONOCYTES NFR BLD AUTO: 13 % (ref 4–12)
MRSA NOSE QL CULT: ABNORMAL
MRSA NOSE QL CULT: ABNORMAL
MV E'TISSUE VEL-LAT: 8 CM/S
MV E'TISSUE VEL-SEP: 6 CM/S
MV PEAK A VEL: 0.73 M/S
MV PEAK E VEL: 64 CM/S
MV STENOSIS PRESSURE HALF TIME: 47 MS
MV VALVE AREA P 1/2 METHOD: 4.68 CM2
NEUTROPHILS # BLD AUTO: 10.97 THOUSANDS/ÂΜL (ref 1.85–7.62)
NEUTS SEG NFR BLD AUTO: 75 % (ref 43–75)
NRBC BLD AUTO-RTO: 0 /100 WBCS
PHOSPHATE SERPL-MCNC: 4.4 MG/DL (ref 2.3–4.1)
PLATELET # BLD AUTO: 184 THOUSANDS/UL (ref 149–390)
PMV BLD AUTO: 11.1 FL (ref 8.9–12.7)
POTASSIUM SERPL-SCNC: 4.9 MMOL/L (ref 3.5–5.3)
PROCALCITONIN SERPL-MCNC: 0.56 NG/ML
RBC # BLD AUTO: 3.49 MILLION/UL (ref 3.81–5.12)
RIGHT ATRIUM AREA SYSTOLE A4C: 10.5 CM2
RIGHT VENTRICLE ID DIMENSION: 3 CM
SL CV LEFT ATRIUM LENGTH A2C: 4.1 CM
SL CV LV EF: 55
SL CV PED ECHO LEFT VENTRICLE DIASTOLIC VOLUME (MOD BIPLANE) 2D: 72 ML
SL CV PED ECHO LEFT VENTRICLE SYSTOLIC VOLUME (MOD BIPLANE) 2D: 24 ML
SODIUM SERPL-SCNC: 139 MMOL/L (ref 135–147)
TRICUSPID ANNULAR PLANE SYSTOLIC EXCURSION: 2.2 CM
VANCOMYCIN TROUGH SERPL-MCNC: 11.4 UG/ML (ref 10–20)
WBC # BLD AUTO: 14.72 THOUSAND/UL (ref 4.31–10.16)

## 2023-08-17 PROCEDURE — 85025 COMPLETE CBC W/AUTO DIFF WBC: CPT | Performed by: NURSE PRACTITIONER

## 2023-08-17 PROCEDURE — 95816 EEG AWAKE AND DROWSY: CPT | Performed by: STUDENT IN AN ORGANIZED HEALTH CARE EDUCATION/TRAINING PROGRAM

## 2023-08-17 PROCEDURE — 93306 TTE W/DOPPLER COMPLETE: CPT | Performed by: INTERNAL MEDICINE

## 2023-08-17 PROCEDURE — 70544 MR ANGIOGRAPHY HEAD W/O DYE: CPT

## 2023-08-17 PROCEDURE — NC001 PR NO CHARGE: Performed by: NURSE PRACTITIONER

## 2023-08-17 PROCEDURE — 70549 MR ANGIOGRAPH NECK W/O&W/DYE: CPT

## 2023-08-17 PROCEDURE — 80202 ASSAY OF VANCOMYCIN: CPT | Performed by: PHYSICIAN ASSISTANT

## 2023-08-17 PROCEDURE — 84100 ASSAY OF PHOSPHORUS: CPT | Performed by: NURSE PRACTITIONER

## 2023-08-17 PROCEDURE — 93005 ELECTROCARDIOGRAM TRACING: CPT

## 2023-08-17 PROCEDURE — A9575 INJ GADOTERATE MEGLUMI 0.1ML: HCPCS | Performed by: INTERNAL MEDICINE

## 2023-08-17 PROCEDURE — 80048 BASIC METABOLIC PNL TOTAL CA: CPT | Performed by: NURSE PRACTITIONER

## 2023-08-17 PROCEDURE — 99291 CRITICAL CARE FIRST HOUR: CPT | Performed by: INTERNAL MEDICINE

## 2023-08-17 PROCEDURE — 94760 N-INVAS EAR/PLS OXIMETRY 1: CPT

## 2023-08-17 PROCEDURE — 92523 SPEECH SOUND LANG COMPREHEN: CPT | Performed by: SPEECH-LANGUAGE PATHOLOGIST

## 2023-08-17 PROCEDURE — 84145 PROCALCITONIN (PCT): CPT | Performed by: NURSE PRACTITIONER

## 2023-08-17 PROCEDURE — 97530 THERAPEUTIC ACTIVITIES: CPT

## 2023-08-17 PROCEDURE — 92610 EVALUATE SWALLOWING FUNCTION: CPT | Performed by: SPEECH-LANGUAGE PATHOLOGIST

## 2023-08-17 PROCEDURE — C8929 TTE W OR WO FOL WCON,DOPPLER: HCPCS

## 2023-08-17 PROCEDURE — 99223 1ST HOSP IP/OBS HIGH 75: CPT | Performed by: PSYCHIATRY & NEUROLOGY

## 2023-08-17 PROCEDURE — NC001 PR NO CHARGE: Performed by: INTERNAL MEDICINE

## 2023-08-17 PROCEDURE — 83735 ASSAY OF MAGNESIUM: CPT | Performed by: NURSE PRACTITIONER

## 2023-08-17 PROCEDURE — 99223 1ST HOSP IP/OBS HIGH 75: CPT | Performed by: PHYSICIAN ASSISTANT

## 2023-08-17 PROCEDURE — 82948 REAGENT STRIP/BLOOD GLUCOSE: CPT

## 2023-08-17 PROCEDURE — 99232 SBSQ HOSP IP/OBS MODERATE 35: CPT | Performed by: INTERNAL MEDICINE

## 2023-08-17 PROCEDURE — 95816 EEG AWAKE AND DROWSY: CPT

## 2023-08-17 PROCEDURE — 70553 MRI BRAIN STEM W/O & W/DYE: CPT

## 2023-08-17 PROCEDURE — 97535 SELF CARE MNGMENT TRAINING: CPT

## 2023-08-17 RX ORDER — FENTANYL CITRATE 50 UG/ML
25 INJECTION, SOLUTION INTRAMUSCULAR; INTRAVENOUS ONCE
Status: DISCONTINUED | OUTPATIENT
Start: 2023-08-17 | End: 2023-08-17

## 2023-08-17 RX ORDER — METOPROLOL TARTRATE 5 MG/5ML
5 INJECTION INTRAVENOUS ONCE
Status: COMPLETED | OUTPATIENT
Start: 2023-08-17 | End: 2023-08-17

## 2023-08-17 RX ORDER — ATORVASTATIN CALCIUM 40 MG/1
40 TABLET, FILM COATED ORAL
Status: DISCONTINUED | OUTPATIENT
Start: 2023-08-18 | End: 2023-08-22 | Stop reason: HOSPADM

## 2023-08-17 RX ORDER — GADOTERATE MEGLUMINE 376.9 MG/ML
20 INJECTION INTRAVENOUS
Status: COMPLETED | OUTPATIENT
Start: 2023-08-17 | End: 2023-08-17

## 2023-08-17 RX ORDER — ASPIRIN 81 MG/1
81 TABLET, CHEWABLE ORAL DAILY
Status: DISCONTINUED | OUTPATIENT
Start: 2023-08-17 | End: 2023-08-22 | Stop reason: HOSPADM

## 2023-08-17 RX ORDER — METOPROLOL TARTRATE 5 MG/5ML
5 INJECTION INTRAVENOUS EVERY 6 HOURS PRN
Status: DISCONTINUED | OUTPATIENT
Start: 2023-08-17 | End: 2023-08-18

## 2023-08-17 RX ADMIN — FLUTICASONE PROPIONATE 1 SPRAY: 50 SPRAY, METERED NASAL at 08:52

## 2023-08-17 RX ADMIN — ATORVASTATIN CALCIUM 20 MG: 20 TABLET, FILM COATED ORAL at 16:30

## 2023-08-17 RX ADMIN — FLUTICASONE FUROATE AND VILANTEROL TRIFENATATE 1 PUFF: 100; 25 POWDER RESPIRATORY (INHALATION) at 08:52

## 2023-08-17 RX ADMIN — METOPROLOL TARTRATE 5 MG: 5 INJECTION INTRAVENOUS at 16:39

## 2023-08-17 RX ADMIN — ACETAMINOPHEN 650 MG: 325 TABLET ORAL at 14:43

## 2023-08-17 RX ADMIN — CEFEPIME 2000 MG: 2 INJECTION, POWDER, FOR SOLUTION INTRAVENOUS at 05:54

## 2023-08-17 RX ADMIN — METOPROLOL TARTRATE 25 MG: 25 TABLET, FILM COATED ORAL at 17:26

## 2023-08-17 RX ADMIN — NYSTATIN: 100000 POWDER TOPICAL at 08:53

## 2023-08-17 RX ADMIN — APIXABAN 5 MG: 5 TABLET, FILM COATED ORAL at 08:51

## 2023-08-17 RX ADMIN — PERFLUTREN 0.4 ML/MIN: 6.52 INJECTION, SUSPENSION INTRAVENOUS at 10:48

## 2023-08-17 RX ADMIN — VANCOMYCIN HYDROCHLORIDE 1250 MG: 10 INJECTION, POWDER, LYOPHILIZED, FOR SOLUTION INTRAVENOUS at 10:15

## 2023-08-17 RX ADMIN — METRONIDAZOLE 500 MG: 500 INJECTION, SOLUTION INTRAVENOUS at 01:20

## 2023-08-17 RX ADMIN — ACETAMINOPHEN 650 MG: 325 TABLET ORAL at 17:26

## 2023-08-17 RX ADMIN — ACETAMINOPHEN 650 MG: 325 TABLET ORAL at 23:41

## 2023-08-17 RX ADMIN — INSULIN LISPRO 4 UNITS: 100 INJECTION, SOLUTION INTRAVENOUS; SUBCUTANEOUS at 16:28

## 2023-08-17 RX ADMIN — NYSTATIN 1 APPLICATION: 100000 POWDER TOPICAL at 17:27

## 2023-08-17 RX ADMIN — PANTOPRAZOLE SODIUM 40 MG: 40 TABLET, DELAYED RELEASE ORAL at 08:51

## 2023-08-17 RX ADMIN — ACETAMINOPHEN 650 MG: 325 TABLET ORAL at 05:52

## 2023-08-17 RX ADMIN — METRONIDAZOLE 500 MG: 500 INJECTION, SOLUTION INTRAVENOUS at 08:51

## 2023-08-17 RX ADMIN — Medication 250 MG: at 17:26

## 2023-08-17 RX ADMIN — ASPIRIN 81 MG CHEWABLE TABLET 81 MG: 81 TABLET CHEWABLE at 09:06

## 2023-08-17 RX ADMIN — INSULIN GLARGINE 25 UNITS: 100 INJECTION, SOLUTION SUBCUTANEOUS at 21:42

## 2023-08-17 RX ADMIN — GADOTERATE MEGLUMINE 20 ML: 376.9 INJECTION INTRAVENOUS at 19:13

## 2023-08-17 RX ADMIN — MONTELUKAST 10 MG: 10 TABLET, FILM COATED ORAL at 21:42

## 2023-08-17 RX ADMIN — METOPROLOL TARTRATE 5 MG: 5 INJECTION INTRAVENOUS at 14:28

## 2023-08-17 RX ADMIN — INSULIN LISPRO 2 UNITS: 100 INJECTION, SOLUTION INTRAVENOUS; SUBCUTANEOUS at 11:34

## 2023-08-17 RX ADMIN — ACETAMINOPHEN 650 MG: 325 TABLET ORAL at 00:20

## 2023-08-17 RX ADMIN — Medication 250 MG: at 08:51

## 2023-08-17 NOTE — QUICK NOTE
Patient not seen, recommendation based on information provided by ICU provider Kimmy Miles over the phone    Stroke alert called: [facility] on 8/17    Neurology response immediate  LKW: 0725 on 8/17  NIHSS at least 8 for aphasia, not answer questions, LUE weakness  per  Exam by ICU provider Kimmy Miles     CTH: no acute abnormalities  CTA H/N, ICU team decided to hold for now     IVtPA: TPA Decision: reported patient on [facility]  Thrombectomy: pending CTA     A/P   71 yo F, with hx including Afib on eliquis, admitted for septic shock. Reported LKN was 0725. ICU provider then found the patient not able to answer questions, reported has aphasia and also concern for L arm? Hand? Weakness. Based on the initial reporting, patient NIHSSt least 8    Discussed with [facility] and Dr. Boland Client, patient is stroke alerted for reported new symptoms of ? aphasia, LUE weakness. I cannot rule out the possibility of LVO. Therefore, CTA will be needed for further assessment. Update, Dr. Boland Client examined patient and believes patient is close to her baseline. Due to her poor kidney function, he decided to hold CTA head and neck. Impression  Unclear whether patient truly had aphasia or not, based on information received by ICU team.   She is certainly not a candidate for TNK due to being on eliquis. Cannot assess whether patient has LVO occlusion or not, given no CTA performed. If patient truly back to baseline, the chance of LVO is certainly low but cannot completely rule out. Patient also has complicted comorbidities, including SEBASTIAN, septic shock etc.  We will defer the final decision to ICU team. May consider MRA head/neck instead. If there is still concern that patient may had a stroke, recommend to hold morning dose of eliquis. And repeat CTH in 6 hours.  If Repeat CTH or MRI brain rules out acute infarct, please resume eliquis

## 2023-08-17 NOTE — PROGRESS NOTES
Discussed with nephrologist Dr. Kaleb Low regarding contrast for MRA: agreed to proceed with MRA with contrast in regards patient's renal situation.     Discussed with Dr. Bogdan Yoo reading radiologist who approved MRI/MRA head and neck with contrast. Discussed with MRI technician

## 2023-08-17 NOTE — PLAN OF CARE
Problem: OCCUPATIONAL THERAPY ADULT  Goal: Performs self-care activities at highest level of function for planned discharge setting. See evaluation for individualized goals. Description: Treatment Interventions: ADL retraining, Functional transfer training, UE strengthening/ROM, Endurance training, Patient/family training, Equipment evaluation/education, Compensatory technique education, Continued evaluation, Energy conservation, Activityengagement          See flowsheet documentation for full assessment, interventions and recommendations. Outcome: Not Progressing  Note: Limitation: Decreased ADL status, Decreased UE strength, Decreased Safe judgement during ADL, Decreased endurance, Decreased self-care trans, Decreased high-level ADLs  Prognosis: Good  Assessment: Patient seen for OT treatment. Patient s/p stroke alert this am.  Patient is alert today but very minimal command follow. Patient seems to have expressive aphasia and receptive. Patient unable to complete AROM BUE today. Patient assisted with muscle contraction BUE with AAROM. Patient able to look at therapist and open and close eyes with increased time today. Patient will benefit from continued OT services to maximize functional performance with ADLS.      OT Discharge Recommendation: Post acute rehabilitation services

## 2023-08-17 NOTE — PROGRESS NOTES
[address] NOTE   Markell Stringer 72 y.o. female MRN: [id number]  Unit/Bed#: ICU 01 Encounter: [id number]  Reason for Consult: SEBASTIAN on CKD    ASSESSMENT and PLAN:  60-year-old female with history of CKD admitted with sepsis secondary to pneumonia. We are consulted for management of SEBASTIAN on CKD. 1. Acute kidney injury. Etiology of acute kidney injury most likely ATN in setting of infection +/- episodes of hypotension and contribution of cardiorenal syndrome. Etiology of CKD most likely hypertension, diabetes, cardiorenal syndrome. Creatinine at baseline is 1.5-1.8, creatinine at presentation 1.64, peak creatinine 2.9 on 08/15, creatinine trending down to 2.7 yesterday, pending today. Urinalysis showed trace protein, 0-1 RBC, 4-10 WBC. UACR 55 mg/g 03/2023. Kidney ultrasound did not show hydronephrosis. She was given a trial of IV Lasix 40 mg x 2 on 08/15 which led to improvement in serum creatinine. She did receive a dose of IV Lasix 40 mg on 08/16 but later became hypotensive and was transferred to ICU and received IV fluids. Currently off IV fluids and diuretics. Continue to hold diuretics today. Follow results of BMP today. No indication for renal replacement therapy at this time. 2. Hypertension/volume. She has history of diastolic CHF and follows with heart failure service. Home medications include Toprol-XL 50 mg twice daily and torsemide 40 mg daily. CT chest on admission consistent with mild septal thickening suggestive of interstitial edema. Cardiac BNP was also elevated at 213. Chest x-ray yesterday did not show any evidence of pulmonary edema and is probably improved after administration of diuretics since admission. Given episodes of hypotension and ongoing sepsis, okay to hold diuretics today. Monitor volume status closely. 3. Anemia of CKD. Baseline hemoglobin is around 10-11. Currently hemoglobin 10.1. Currently hemoglobin 10.3.   Serum ferritin 168, iron saturation 5 consistent with functional iron deficiency. Hold off iron supplementation in setting of ongoing pneumonia. We will consider intravenous iron once he recovers from infection. No indication for erythropoietin stimulating agents. 4. Hyperkalemia. Potassium was elevated to 5.5 yesterday morning and later improved to 4.7 after administration of Lokelma. Monitor serum potassium. Continue low potassium diet. 5. Bone mineral disease. 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 Ionized calcium was low yesterday and she was having intermittent twitching status post IV calcium gluconate administration. 6. Pneumonia. Currently on IV vancomycin by level, intravenous cefepime and Flagyl per ICU team.    7. New onset twitching 08/16. This was most likely due to combination of hypocalcemia and high-dose of pregabalin in setting of SEBASTIAN. Twitching is currently resolved after intravenous calcium supplementation and holding pregabalin. Discussed with ICU team.  After discussion, we agreed to follow-up on BMP results and to hold off on diuretics today. SUBJECTIVE / 24H INTERVAL HISTORY:  Urine output recorded as 600 cc. She was transferred to ICU due to hypotension. She was given broad-spectrum antibiotics. Diuretics were discontinued and IV fluids were given. This morning she is awake and alert. Denies dyspnea. Continues to have leg swelling. Review of Systems   Constitutional: Negative for chills and fever. HENT: Negative for ear pain and sore throat. Eyes: Negative for pain and visual disturbance. Respiratory: Negative for cough and shortness of breath. Cardiovascular: Negative for chest pain and palpitations. Gastrointestinal: Negative for abdominal pain and vomiting. Genitourinary: Negative for dysuria and hematuria. Musculoskeletal: Negative for arthralgias and back pain. Skin: Negative for color change and rash.    Neurological: Negative for seizures and syncope. All other systems reviewed and are negative. OBJECTIVE:  Current Weight: Weight - Scale: 129 kg (283 lb 15.2 oz)  Vitals:    08/17/23 0437 08/17/23 0500 08/17/23 0552 08/17/23 0600   BP:  129/59     BP Location:       Pulse:  76     Resp:  (!) 23     Temp:   (!) 96.5 °F (35.8 °C)    TempSrc:   Tympanic    SpO2: 96% 96%     Weight:    129 kg (283 lb 15.2 oz)   Height:           Intake/Output Summary (Last 24 hours) at 8/17/2023 0746  Last data filed at 8/17/2023 0530  Gross per 24 hour   Intake 2700 ml   Output 1100 ml   Net 1600 ml     Physical Exam  Vitals and nursing note reviewed. Constitutional:       General: She is not in acute distress. Appearance: She is well-developed. HENT:      Head: Normocephalic and atraumatic. Eyes:      Conjunctiva/sclera: Conjunctivae normal.   Cardiovascular:      Rate and Rhythm: Normal rate and regular rhythm. Heart sounds: No murmur heard. Pulmonary:      Effort: Pulmonary effort is normal. No respiratory distress. Comments: Decreased breath sounds bilaterally at bases  Abdominal:      Palpations: Abdomen is soft. Tenderness: There is no abdominal tenderness. Musculoskeletal:         General: No swelling. Cervical back: Neck supple. Right lower leg: Edema present. Left lower leg: Edema present. Skin:     General: Skin is warm and dry. Capillary Refill: Capillary refill takes less than 2 seconds. Neurological:      Mental Status: She is alert.    Psychiatric:         Mood and Affect: Mood normal.       Medications:    Current Facility-Administered Medications:   •  acetaminophen (TYLENOL) tablet 650 mg, 650 mg, Oral, Q6H SELMA, Libby Plunkett V, CRNP, 650 mg at 08/17/23 0552  •  ammonium lactate (LAC-HYDRIN) 12 % lotion 1 Application, 1 Application, Topical, BID PRN, Cameron Benjamin V, CRNP, 1 Application at 32/46/88 9609  •  apixaban (ELIQUIS) tablet 5 mg, 5 mg, Oral, BID, Moses Calzada RK Laureano, 5 mg at 08/16/23 1749  •  atorvastatin (LIPITOR) tablet 20 mg, 20 mg, Oral, Daily With Kayla RK Cordero, 20 mg at 08/16/23 1752  •  cefepime (MAXIPIME) 2 g/50 mL dextrose IVPB, 2 g, Intravenous, Q12H, RK Vee, Last Rate: 100 mL/hr at 08/17/23 0554, 2,000 mg at 08/17/23 0554  •  fluticasone (FLONASE) 50 mcg/act nasal spray 1 spray, 1 spray, Nasal, Daily, RK Vee, 1 spray at 08/16/23 0843  •  Fluticasone Furoate-Vilanterol 100-25 mcg/actuation 1 puff, 1 puff, Inhalation, Daily, RK Vee, 1 puff at 08/16/23 0843  •  insulin glargine (LANTUS) subcutaneous injection 25 Units 0.25 mL, 25 Units, Subcutaneous, HS, RK Vee, 25 Units at 08/16/23 2212  •  insulin lispro (HumaLOG) 100 units/mL subcutaneous injection 10 Units, 10 Units, Subcutaneous, TID With Meals, RK Vee, 10 Units at 08/16/23 1216  •  insulin lispro (HumaLOG) 100 units/mL subcutaneous injection 2-12 Units, 2-12 Units, Subcutaneous, TID AC, 4 Units at 08/16/23 1217 **AND** Fingerstick Glucose (POCT), , , TID AC, RK Vee  •  insulin lispro (HumaLOG) 100 units/mL subcutaneous injection 2-12 Units, 2-12 Units, Subcutaneous, HS, RK Vee, 4 Units at 08/14/23 2126  •  insulin lispro (HumaLOG) 100 units/mL subcutaneous injection 2-12 Units, 2-12 Units, Subcutaneous, 0200, RK Vee, 2 Units at 08/15/23 0222  •  metroNIDAZOLE (FLAGYL) IVPB (premix) 500 mg 100 mL, 500 mg, Intravenous, Q8H, Stanley Mcclain PA-C, Last Rate: 200 mL/hr at 08/17/23 0120, 500 mg at 08/17/23 0120  •  montelukast (SINGULAIR) tablet 10 mg, 10 mg, Oral, HS, RK Vee, 10 mg at 08/16/23 2212  •  nystatin (MYCOSTATIN) powder, , Topical, BID, RK Vee, 1 Application at 83/81/09 1748  •  ondansetron (ZOFRAN) injection 4 mg, 4 mg, Intravenous, Q6H PRN, Becca Tillman V, CRNP, 4 mg at 08/13/23 1752  •  pantoprazole (PROTONIX) EC tablet 40 mg, 40 mg, Oral, Daily, Libby Spironello V, CRNP, 40 mg at 08/16/23 8233  •  saccharomyces boulardii (FLORASTOR) capsule 250 mg, 250 mg, Oral, BID, Libby Spironello V, CRNP, 250 mg at 08/16/23 3734  •  vancomycin (VANCOCIN) 1,250 mg in sodium chloride 0.9 % 250 mL IVPB, 15 mg/kg (Adjusted), Intravenous, Daily PRN, Reba Rodrigues PA-C    Laboratory Results:  Results from last 7 days   Lab Units 08/17/23  0547 08/16/23  1322 08/16/23  0455 08/15/23  0432 08/14/23  0559 08/13/23  0446 08/12/23  1158 08/12/23  0512 08/11/23  1236   WBC Thousand/uL 14.72* 13.31* 16.06* 11.61* 14.02* 14.04* 15.48*  --  16.88*   HEMOGLOBIN g/dL 10.1* 10.0* 10.3* 9.1* 9.6* 10.1* 10.9*  --  11.9   HEMATOCRIT % 32.1* 31.4* 32.6* 30.2* 31.8* 33.0* 36.0  --  37.6   PLATELETS Thousands/uL 184 185 173 143* 142* 133* 153  --  171   POTASSIUM mmol/L  --  4.7 5.5* 4.6 4.5 4.2  --  4.5 4.5   CHLORIDE mmol/L  --  103 104 102 101 104  --  104 97   CO2 mmol/L  --  28 28 28 27 29  --  24 33*   BUN mg/dL  --  67* 65* 58* 47* 37*  --  36* 35*   CREATININE mg/dL  --  2.71* 2.70* 2.91* 2.69* 2.01*  --  1.82* 1.64*   CALCIUM mg/dL  --  8.8 8.5 7.9* 8.2* 7.9*  --  8.3* 9.0     Portions of the record may have been created with voice recognition software. Occasional wrong word or "sound a like" substitutions may have occurred due to the inherent limitations of voice recognition software. Read the chart carefully and recognize, using context, where substitutions have occurred. If you have any questions, please contact the dictating provider.

## 2023-08-17 NOTE — CONSULTS
[address] [facility]   Neurology Initial Consult    Herb Murphy is a 72 y.o. female  ICU 01/ICU 01          Information obtained from:   Chief Complaint   Patient presents with   • Fever     Pt arrives with her niece who states this AM she was in afib and acting confused and having chills. Assessment/Plan:     1. Expressive/receptive aphasia  2. Metabolic encephalopathy  3. Sepsis of unknown source  4. Hypotensive events related to #3  5. CKD  6. History of hypertension  7. HLD    -Monitored on telemetry  -Frequent neuro assessments  -Eliquis 5 mg p.o. twice daily  -We will increase Lipitor to 40 mg daily  -Permissive hypertension  -Avoid hypotensive episodes due to hypoperfusion  -MRI of the brain with without  -MRA of the head and carotids with without contrast  -Echocardiogram with shunt study-EF of 55 to 60% normal wall motion. Bilateral atrium normal in size mildly calcific and thickened aortic leaflets with normal mobility, mitral valve structure is normal trace regurgitation tricuspid with trace regurgitation no evidence of stenosis and pulmonic valve with trace regurgitation no evidence of stenosis  -Lipid profile and A1c  -If patient is unable to have MRIs- recommendation for repeat CT of the head. Also recommend CTA of the head and neck from neurological perspective to evaluate for large vessel occlusive disease. Can defer if patient is able to have MR studies  -PT/OT/ST    Patient is a 79-year-old female who was hospitalized with shortness of breath, atrial fibrillation and sepsis. Patient was having 2-day history of confusion, she presented to the hospital with a temperature over 103 degrees. Her blood pressure was 82/56. She was admitted and started on antibiotics, 2 days later continued to have high temps therefore her antibiotic regimen had been changed. She was started on IV cefepime. She was also hypotensive receiving IV fluids and albumin.   8/16/2023, patient became very lethargic, she appeared to be in fluid overload. Her blood pressure was 84/45. She was in atrial fibrillation. Patient had confusion and chills. Suspected patient with some degree of septic shock, she was admitted to ICU for further treatment and evaluation. This a.m. patient was evaluated and appeared to have left upper extremity weakness with aphasia. Which appears to be an acute change from her prior hospitalization assessments. Patient became a stroke alert and was taken for CAT scan. She had a CAT scan of the head which was negative for any significant abnormal or acute findings. CTA was deferred due to patient's chronic kidney disease with exacerbation, GFR this a.m. was 19 with a creatinine level of 2.39. Patient reevaluated by the critical care team and felt that her symptoms had returned back to baseline, given her kidney status did not feel CTA was of benefit at that time. Neurological evaluation at bedside proved significant receptive and expressive aphasia. Patient is able to follow some commands however others with multi function are limited. She is able to provide her name however unable to provide any other oriented conversation. She is able to follow some simple commands however unable to follow multifunctional commands. Patient's NIH score on exam was 12, this was limited due to weakness in the bilateral lower extremities as well as left upper extremity related to frozen shoulder. Patient is unable to process multifunctional commands therefore unable to fully test coordination and ataxia. In conversation patient requires refocusing, I do I concentration in order for her to respond appropriately. If patient has not focus, she is only able to say yes/no or okay to any questioning. There does not seem to be any rhyme or reason to her answers, i.e. when asked if object was cold patient said yes then asked if object was hot patient said yes.   Patient was able to name 2 of 5 objects, unable to identify written numbers or letters. Patient was also unable to write numbers or letters per her request.  Speech therapy present for further evaluation of aphasia. Patient does have weakness in her left upper extremity compared to her right. Bilateral lower extremities are equally weak and dropped to the bed, no resistance to gravity. Patient appears to have visual extinction however her visual fields do appear to be intact. Patient has equal reflexes in the bilateral upper extremities, no reflex to the right patellar, trace to the left. Achilles absent and plantar reflexes are downgoing. Suspect patient having an acute ischemic event, CT of the head negative. CTA of the head and neck was recommended, without CTA large vessel occlusion cannot be ruled out. There are questions with regards to patient's kidney status in the event of inability to perform CTA, would recommend MRA of the head and neck as well as MRI of the brain for further evaluation of ischemia. It was initially reported patient was unable to have MRI due to hardware in her shoulder, recommended further evaluation by the MRI team to determine if patient is MRI compatible. It appears as though patient is MRI compatible and is now scheduled for MRI/MRA. Will need to continue to follow for ischemic event and large vessel occlusion and evaluation of intervention if appropriate. Patient remains on Eliquis 5 mg twice daily, she is not a TNK candidate due to the use of anticoagulation. She is also on Lipitor 20 mg, increased to 40 mg during this event. We will continue to follow for lipid profile. Patient started on baby aspirin 81 mg daily per the critical care team.    NIHSS:    1a.Level of Consciousness: 0 = Alert   1b. LOC Questions: 2 = Answers neither correctly   1c. LOC Commands: 2 = Obeys neither correctly   2. Best Gaze: 0 = Normal   3. Visual: 0 = No visual field loss   4.  Facial Palsy: 0=Normal symmetric movement 5a. Motor Right Arm: 0=No drift, limb holds 90 (or 45) degrees for full 10 seconds   5b. Motor Left Arm: 1=Drift, limb holds 90 (or 45) degrees but drifts down before full 10 seconds: does not hit bed   6a. Motor Right Leg: 3=No effort against gravity, limb falls   6b. Motor Left Leg: 3=No effort against gravity, limb falls   7. Limb Ataxia:  UN = Untestable (amputation, fused joint)   8. Sensory: 0=Normal; no sensory loss   9. Best Language:  2=Severe aphasia; fragmentary expression, inference needed, cannot identify materials   10. Dysarthria: 0=Normal articulation   11. Extinction and Inattention (formerly Neglect): 1=Visual, tactile, auditory, spatial or personal inattention or extinction to bilateral simultaneous stimulation in one of the sensory modalities   Total Score: 14     NIH score may be limited due to patient's lower extremity weakness bilaterally and the degree of aphasia and patient's abilities to follow command. Jarrod Goodson will need follow up in 8-10 weeks with general attending or advance practitioner. She will not require outpatient neurological testing. HPI:  Jarrod Goodson is a 81yo female with PMH of diabetes, CKD, HLD, HTN, sleep apnea, SVT, pericardial effusions and UTIs who was brought to the ER on 8/11/2023 with onset of confusion. She was short of breath x2 days with onset of confusion causing family concern. Upon arrival to the ER her temperature was 102.6, blood pressure 82/56, creatinine 1.69, BUN 35 with a GFR of 31, Pro-Ephraim 0.08, WBC 16.88. She had a chest x-ray which showed right lower lobe infiltrate for which she was started on antibiotic therapy and admitted for sepsis. As of 8/13/2023, patient had ongoing hypotension, received IV fluids and albumin. Her temperature was 102.6 and she was continued on IV antibiotics however they were switched to cefepime for broad coverage. 8/16/2023, patient continued to have episodes of fevers.   Her blood pressure was 84/45, she appeared to be in fluid overload. She was satting 95% on 2 L nasal cannula. She had onset of tremors and converted into atrial fibrillation. Patient was significantly lethargic and confused. She also reported episodes of chills. Patient was taken for CAT scan of the head to rule out underlying neurological event, this was negative for any acute findings. Patient was then evaluated by the critical care team, she was admitted for hypotension and sepsis, likely septic shock. She had continued evaluations and treatment for her hemodynamic instability. 8/17/2023, stroke alert was called at 8:13 AM.  Neurology MD on-call responded immediately, her last known normal was 7:25 AM.  Was reported her NIH scale to be at least 8, she was aphasic and unable to answer questions. They reported some left upper extremity weakness as well. Patient was taken for CAT scan, she had a CT of the head which showed no acute abnormalities. Patient also had elevated creatinine, now at 2.39. Her BUN is up to 65 with a low GFR of 19. Patient's Pro-Ephraim has increased to 0.56 and her WBC counts remain elevated at 14.72. Nephrology on board regarding SEBASTIAN with CKD, critical care team determination to hold on CTA of the head and neck given patient's kidney functions. Per attending neurology MD, question the possibility of large vessel occlusion which could not be ruled out based on patient's symptomology. Recommendations for CTA of the head and neck. After discussion with the critical care attending, it was felt patient was back to her baseline therefore the CTA was deferred due to her kidney status. Neurology MD recommendations for MRA of the head and neck to evaluate for circulation in the event she could not have the CTA. Also recommended an MRI of the brain to rule out acute CVA.   Advised if patient had any resumption or additional neurological symptoms, would recommend holding additional neurological symptoms, would recommend holding Eliquis x6 hours and repeating her CT of the head. Met with patient at bedside, she is awake alert and oriented to herself. If she is able to focus and concentrate on the examiner she is able to answer simple questions with 1-2 words. Patient unable to tell where she is or what month/year it is. She is able to name 2 of 5 objects however requires refocusing and needs to concentrate on the object prior to naming it. She has some difficulties in following multistep commands. She needs to maintain eye contact with the examiner commanding attention for her to be able to follow the simplest of commands such as sticking out her tongue, squeezing her hands or pushing or pulling with her upper extremities. Patient was unable to follow commands for multiple aspects of the neurological exam.  Patient appears to have visual fields intact, her pupils are equal round and reactive. She does have corneal reflexes. She has facial symmetry with no atrophy noted of the tongue. Horizontal EOM is intact, unable to maintain concentration and focus on fingers for additional testing. Patient appears to have equal sensation, responds briskly to cold sensation as well as strong vibration in the upper and lower extremities bilaterally. She is unable to reliably respond to light touch and vibration. Patient's reflexes are intact in the upper extremities, she has trace patellar reflex, no reflex seen to the right patella or bilateral Achilles. Plantar reflexes with downgoing toes. Patient speech is clear and fluent, she is able to hold brief 2-3 word conversation. She will have intermittent spontaneous conversation however difficulties with processing commands and has had intermittent difficulties with expression. She does have left upper extremity weakness however has history of left frozen shoulder. Otherwise patient's exam is nonfocal in the upper and lower extremities.   It appears as though patient has ongoing severe expressive and receptive aphasia on exam.  Review of patient's medical records during this hospitalization she has previously been alert oriented x3. There is been no indication of expressive or receptive aphasia on her exams. Calculated NIH score is 14 however there may be some limitations with regards to generalized weakness and inability is to follow multisyllable commands. Was advised by the critical care team, patient unable to have MRI due to hardware in her shoulder. Medical team is also had concerns with regards to her kidney status and obtaining a CTA. Overall this limits our ability to adequately assess for acute ischemia as well as large vessel occlusion which may result in targeted intervention. Recommend patient continue on the stroke pathway with TTE, lab works and permissive hypertension. Would avoid significant hypotensive episodes as this may be causing hypoperfusion. Patient did receive her Eliquis today, however in the event she cannot have an MRI would recommend repeating CTA of the head 6 hours post prior study. Should be able to evaluate for acute ischemic event in that time. There is an alert indicating that patient may not be eligible for MRI given her hardware however this has not been determined. Would await MRI technicians to evaluate patient's hardware for appropriateness for MR studies. If patient is MRI compatible, recommend MRI of the brain with MRA of head and neck for vascular evaluation given patient is unable to have CTA. If patient is unable to have MRI, continue to go forward with follow-up CT of head and we continue to recommend CTA of the head and neck in order to be able to make further recommendations regarding patient's neurovascular status. Spoke with critical care ALTHEA, order for MRI has been placed and they are speaking with the department in order to try to have this scheduled for today.   At this point in time, the CT of the head has been canceled in lieu of obtaining MRI.     Past Medical History:   Diagnosis Date   • SEBASTIAN (acute kidney injury) ([address]) 01/23/2023   • Anemia    • Asthma    • Atrial fibrillation ([address])    • Chronic kidney disease    • COVID-19 January 2022   • Diabetes mellitus ([address])    • GERD (gastroesophageal reflux disease)    • Hyperlipidemia    • Hypertension    • PONV (postoperative nausea and vomiting)    • Sleep apnea     wear BIPAP   • SVT (supraventricular tachycardia) ([address])        Past Surgical History:   Procedure Laterality Date   • APPENDECTOMY     • BREAST BIOPSY Right     years ago when she was 23   • BREAST BIOPSY Right 03/13/2023   • BREAST LUMPECTOMY Right 5/30/2023    Procedure: RIGHT BREAST KRIS  DIRECTED LUMPECTOMY - Eino Phalen;  Surgeon: John Paul Anne MD;  Location: [facility];  Service: Surgical Oncology   • CYSTOSCOPY W/ LASER LITHOTRIPSY Left 07/12/2016    Procedure: CYSTOSCOPY URETEROSCOPY WITH LITHOTRIPSY HOLMIUM LASER, RETROGRADE PYELOGRAM AND INSERTION STENT URETERAL;  Surgeon: Ruy Lucas MD;  Location: [facility];  Service:    • DILATION AND CURETTAGE OF UTERUS     • IR THORACENTESIS  03/18/2022   • JOINT REPLACEMENT      right knee   • KNEE ARTHROPLASTY Right    • MAMMO NEEDLE LOCALIZATION LEFT (ALL INC) EACH ADD Right 4/24/2023   • MAMMO STEREOTACTIC BREAST BIOPSY RIGHT (ALL INC) Right 03/13/2023    High Risk Lesion   • IL ARTHROPLASTY GLENOHUMERAL JOINT TOTAL SHOULDER Left 03/01/2022    Procedure: ARTHROPLASTY SHOULDER REVERSE;  Surgeon: Traci Brandon MD;  Location: [facility] OR;  Service: Orthopedics   • IL CYSTO BLADDER W/URETERAL CATHETERIZATION Left 06/29/2016    Procedure: CYSTOSCOPY RETROGRADE PYELOGRAM WITH INSERTION STENT URETERAL, left;  Surgeon: Ruy Lucas MD;  Location: [facility];  Service: Urology   • SHOULDER ARTHROTOMY Left        Allergies   Allergen Reactions   • Penicillins Hives   • Moxifloxacin Other (See Comments)     unknown   • Oxycodone-Acetaminophen Other (See Comments)     GI upset   • Zinc Acetate Other (See Comments)     unknown   • Penicillins Hives   • Asa [Aspirin] GI Intolerance   • Indocin [Indomethacin] Other (See Comments)     Made patient "loopy"   • Other Other (See Comments)     unknown   • Tannic Acid Rash         Current Facility-Administered Medications:   •  acetaminophen (TYLENOL) tablet 650 mg, 650 mg, Oral, Q6H SELMA, MARLENE VeeNP, 650 mg at 08/17/23 0552  •  ammonium lactate (LAC-HYDRIN) 12 % lotion 1 Application, 1 Application, Topical, BID PRN, Mikey Paget V, CRNP, 1 Application at 26/23/27 1886  •  aspirin chewable tablet 81 mg, 81 mg, Oral, Daily, Masco MARLENE MonteroNP, 81 mg at 08/17/23 7214  •  atorvastatin (LIPITOR) tablet 20 mg, 20 mg, Oral, Daily With Dinner, Noah Plunkett V, MARLENENP, 20 mg at 08/16/23 1752  •  fluticasone (FLONASE) 50 mcg/act nasal spray 1 spray, 1 spray, Nasal, Daily, Libby Plunkett V CRNP, 1 spray at 08/17/23 7103  •  Fluticasone Furoate-Vilanterol 100-25 mcg/actuation 1 puff, 1 puff, Inhalation, Daily, Libby Plunkett V CRNP, 1 puff at 08/17/23 0852  •  insulin glargine (LANTUS) subcutaneous injection 25 Units 0.25 mL, 25 Units, Subcutaneous, HS, RK Vee, 25 Units at 08/16/23 2212  •  insulin lispro (HumaLOG) 100 units/mL subcutaneous injection 10 Units, 10 Units, Subcutaneous, TID With Meals, RK Vee, 10 Units at 08/16/23 1216  •  insulin lispro (HumaLOG) 100 units/mL subcutaneous injection 2-12 Units, 2-12 Units, Subcutaneous, TID AC, 4 Units at 08/16/23 1217 **AND** Fingerstick Glucose (POCT), , , TID AC, RK Vee  •  insulin lispro (HumaLOG) 100 units/mL subcutaneous injection 2-12 Units, 2-12 Units, Subcutaneous, HS, RK Vee, 4 Units at 08/14/23 2126  •  insulin lispro (HumaLOG) 100 units/mL subcutaneous injection 2-12 Units, 2-12 Units, Subcutaneous, 0200, Noah Forman Spironello V, CRNP, 2 Units at 08/15/23 0222  •  metroNIDAZOLE (FLAGYL) IVPB (premix) 500 mg 100 mL, 500 mg, Intravenous, Q8H, Amrit Nascimento PA-C, Last Rate: 200 mL/hr at 23 0851, 500 mg at 23 0851  •  montelukast (SINGULAIR) tablet 10 mg, 10 mg, Oral, HS, Libby Spironello V, CRNP, 10 mg at 23 2212  •  nystatin (MYCOSTATIN) powder, , Topical, BID, Libby Spironello V, CRNP, Given at 23 9434  •  ondansetron (ZOFRAN) injection 4 mg, 4 mg, Intravenous, Q6H PRN, Libby Spironello V, CRNP, 4 mg at 23 1752  •  pantoprazole (PROTONIX) EC tablet 40 mg, 40 mg, Oral, Daily, Libby Spironello V, CRNP, 40 mg at 23 0540  •  saccharomyces boulardii (FLORASTOR) capsule 250 mg, 250 mg, Oral, BID, Libby Spironello V, CRNP, 250 mg at 23 0851  •  vancomycin (VANCOCIN) 1,250 mg in sodium chloride 0.9 % 250 mL IVPB, 15 mg/kg (Adjusted), Intravenous, Daily PRN, Amrit Nascimento PA-C    Social History     Socioeconomic History   • Marital status: Single     Spouse name: Not on file   • Number of children: 0   • Years of education: 15   • Highest education level: Associate degree: academic program   Occupational History   • Not on file   Tobacco Use   • Smoking status: Former     Packs/day: 1.00     Years: 20.00     Total pack years: 20.00     Types: Cigarettes     Quit date: 1996     Years since quittin.1     Passive exposure: Past   • Smokeless tobacco: Never   Vaping Use   • Vaping Use: Never used   Substance and Sexual Activity   • Alcohol use: Not Currently     Comment: rarely   • Drug use: Never   • Sexual activity: Not Currently   Other Topics Concern   • Not on file   Social History Narrative    ** Merged History Encounter **          Social Determinants of Health     Financial Resource Strain: Low Risk  (2023)    Overall Financial Resource Strain (CARDIA)    • Difficulty of Paying Living Expenses: Not hard at all   Food Insecurity: No Food Insecurity (2023) Hunger Vital Sign    • Worried About Running Out of Food in the Last Year: Never true    • Ran Out of Food in the Last Year: Never true   Transportation Needs: No Transportation Needs (8/14/2023)    PRAPARE - Transportation    • Lack of Transportation (Medical): No    • Lack of Transportation (Non-Medical): No   Physical Activity: Not on file   Stress: Not on file   Social Connections: Not on file   Intimate Partner Violence: Not on file   Housing Stability: Low Risk  (8/14/2023)    Housing Stability Vital Sign    • Unable to Pay for Housing in the Last Year: No    • Number of Places Lived in the Last Year: 1    • Unstable Housing in the Last Year: No       Family History   Problem Relation Age of Onset   • Heart disease Mother    • Diabetes Father    • Thyroid cancer Sister    • Heart disease Brother    • Diabetes Brother    • Heart disease Brother    • Heart disease Brother    • Emphysema Maternal Grandmother    • Heart disease Family          Review of systems:  Patient unable to completely participate in subjective feedback    Physical examination:  /74   Pulse 85   Temp (!) 96.5 °F (35.8 °C) (Tympanic)   Resp 16   Ht 5' 2" (1.575 m)   Wt 129 kg (284 lb 6.3 oz)   SpO2 97%   BMI 52.02 kg/m²     GENERAL APPEARANCE:  The patient is alert, oriented to self only. HEENT:  Head is normocephalic. Pupils are equal and reactive. NECK:  Supple without lymphadenopathy. HEART: Irregular rate and rhythm. LUNGS: Mild audible wheezing noted   ABDOMEN:  Soft, nontender, nondistended  EXTREMITIES:  Without cyanosis, clubbing or edema. Mental status: The patient is alert and oriented x1 only. Her speech is clear. She responds with 1-3 word answers, can spontaneously have multiword sentences when not provoked. Patient unable to repeat, at times can comprehend commands and name 2 of 3 objects. Patient unable to identify letters and numbers, unable to describe letters or numbers.   Cranial nerves:  CN II: Visual fields appear to be full to confrontation. Patient able to identify to wiggling fingers in her medial and lateral periphery bilaterally  Fundoscopic exam is normal with sharp discs and no vascular changes. Pupils are 3 mm and briskly reactive to light. CN III, IV, VI: At primary gaze, there is no eye deviation. Horizontal EOM is intact  CN V: Corneal responses are intact. CN VII: Face is symmetric with normal eye closure and smile. CN VIII: Hearing is normal to rubbing fingers  CN IX, X: Palate elevates symmetrically. Phonation is normal.  CN XI: Head turning and shoulder shrug are intact, slightly weaker on the left  CN XII: Tongue is midline with normal movements and no atrophy. Motor: There is + slight drift in LUE on out-stretched arms. Testing is limited to chronic LUE weakness r/t ho frozen left shoulder. Muscle bulk and tone are normal.   Motor exam is limited due to patient's inability to process multistep commands. Lower extremities weak on exam, drops to the bed with testing. Patient able to raise her arms up independently, hold them up as a command. With motor testing, patient unable to follow command regarding deltoid however was able to pull and push slightly when asked. Muscle exam  Arm Right Left Leg Right Left   Deltoid 0 0 Iliopsoas 0 0   Biceps 3+/5 3/5 Quads 0 0   Triceps 3+/5 3/5 Hamstrings 0 0   Wrist Extension 0 0 Ankle Dorsi Flexion 0 0   Wrist Flexion 0 0 Ankle Plantar Flexion 0 0        Reflexes    RJ BJ TJ KJ AJ Plantars Ordonez's   Right 2+ 2+  0 0 Downgoing Not present   Left 2+ 2+  tr 0 Downgoing Not present      Sensory:  Light touch, Temperature and vibration sense appears to be intact in fingers and toes. Testing is limited due to patient's aphasia  Coordination:  Patient unable to perform rapid alternating movements and coordination testing due to inability is to follow multistep commands  There are no abnormal or extraneous movements.    Romberg is deferred, unable to adequately assess   Gait/Stance:  Deferred unable to adequately assess, safety risk    Lab Results   Component Value Date    WBC 14.72 (H) 08/17/2023    HGB 10.1 (L) 08/17/2023    HCT 32.1 (L) 08/17/2023    MCV 92 08/17/2023     08/17/2023     Lab Results   Component Value Date    HGBA1C 9.3 (H) 08/13/2023     Lab Results   Component Value Date    ALT 8 08/16/2023    AST 9 (L) 08/16/2023    ALKPHOS 43 08/16/2023     Lab Results   Component Value Date    CALCIUM 8.6 08/17/2023    K 4.9 08/17/2023    CO2 26 08/17/2023     08/17/2023    BUN 65 (H) 08/17/2023    CREATININE 2.39 (H) 08/17/2023         Review of reports and notes reveal:  Independent Interpretation of images or specimens:  CT stroke alert brain    Result Date: 8/17/2023  No acute intracranial abnormality. Chronic microangiopathic changes. Findings were directly discussed with Lesvia Burt  at 8:37 a.m. on 8/17/2023. Workstation performed: [id number]     CT chest abdomen pelvis wo contrast    Result Date: 8/17/2023  CT chest: New trace bilateral pleural effusions and small pericardial effusion. Findings may be sequela of sepsis, third spacing, or pulmonary vascular congestion congestion. Trace bilateral lower lobe dependent subsegmental atelectasis slightly increased likely due to new pleural effusions. No airspace disease to suggest pneumonia. Incidental partially visualized right breast loculated fluid collection measuring at least 11 cm. Given history of right breast surgery, this may represent chronic seroma or hematoma. Additional chronic findings and negatives as above. CT abdomen and pelvis: No evidence of acute abdominopelvic process. Colonic diverticulosis. Additional chronic findings and negatives as above. This study demonstrates a significant  finding and was documented as such in [facility] for liaison and referring practitioner notification.  Workstation performed: [id number]      kidney and bladder    Result Date: 8/15/2023  Unremarkable kidneys without evidence of hydronephrosis. Workstation performed: [id number]     CT chest wo contrast    Result Date: 8/14/2023  No definite pneumonia. Mild septal thickening suggestive of interstitial edema. Dependent atelectasis in the lower lobes. Several subcentimeter nodules, stable since March 2023, likely stable since March 2022 although that exam is compromised by motion. Workstation performed: [id number]     XR chest portable    Result Date: 8/11/2023  Right base infiltrate and/or atelectasis Workstation performed: [id number]           Thank you for this consult. Total time of encounter: 70 Minutes  More than 50% of time was spent in counseling and coordination of care of patient. Attempted discussions with patient at bedside, discussed with the critical care medical team and attending neurology MD on call.

## 2023-08-17 NOTE — ASSESSMENT & PLAN NOTE
· As evidenced by fever 101.6, tachycardia, tachypnea, and leukocytosis  · Unclear source at this time  · Initially presumed to be pneumonia, also with bacteriuria noted, and area of cellulitic skin changes on right lateral breast  · CXR 8/11 with concern for RLL infiltrate  · CT chest 8/14 bibasilar atelectasis   · Repeat CXR today with bibasilar atelectasis vs infiltrates  · CT C/A/P to r/o occult source of infection- appears unchanged from prior. Formal report pending  · Hypotension with SBP 80  · 30mL IVF of IBW ordered  · MAP goal > 65  · BP improved with IVF  · Procal elevated at 0.62 from 0.36, continue to trend   · Persistent leukocytosis (WBC 13), continue to trend daily  · ATC Tylenol for fever control   · MRSA nasal surveillance pending  · Continue cefepime, add vanco for MRSA coverage, and flagyl for ? UTI  · UA unremarkable   · Repeat BC x 2  · Blood cultures from admission 8/11 negative x 4 days   · Strep pneumo and legionella urine antigens negative  · Obtain sputum culture if able

## 2023-08-17 NOTE — PHYSICAL THERAPY NOTE
PHYSICAL THERAPY     08/17/23 4080   Note Type   Note Type Cancelled Session   Cancel Reasons Other  (patient with nursing care and on 2nd attempt, patient going to MRI(with recent clearance for testing). Will re-attempt at a later time.)   Licensure   [place] License Number  Raisa Chamebrs PT [id number]

## 2023-08-17 NOTE — QUICK NOTE
Stroke alert was called and patient is currently in need for MRI study with contrast.  Several studies have shown that risk of NSF with class 2 agents such as Jett Contras is extremely low. Currently the benefits of the study outweigh the risks.   Okay to proceed with MRI with [facility].

## 2023-08-17 NOTE — SPEECH THERAPY NOTE
Speech/Language Evaluation      Patient Name: Akshat Cronin    QENOV'O Date: roblem List  Principal Problem:    Septic shock [facility])  Active Problems:     Moderate persistent asthma without complication    Morbid obesity with BMI of 45.0-49.9, adult (HCC)    Paroxysmal atrial fibrillation (HCC)    Mixed hyperlipidemia    Essential hypertension    Acute kidney injury superimposed on chronic kidney disease (HCC)    Toxic metabolic encephalopathy    KATRINA (obstructive sleep apnea)    Chronic respiratory failure with hypoxia and hypercapnia (HCC)    Type 2 diabetes mellitus with hyperglycemia, without long-term current use of insulin (HCC)    Ductal carcinoma in situ (DCIS) of right breast    Bacteriuria    Cellulitis      Past Medical History  Past Medical History:   Diagnosis Date   • SEBASTIAN (acute kidney injury) ([address]) 01/23/2023   • Anemia    • Asthma    • Atrial fibrillation (HCC)    • Chronic kidney disease    • COVID-19 January 2022   • Diabetes mellitus ([address])    • GERD (gastroesophageal reflux disease)    • Hyperlipidemia    • Hypertension    • PONV (postoperative nausea and vomiting)    • Sleep apnea     wear BIPAP   • SVT (supraventricular tachycardia) ([address])        Past Surgical History  Past Surgical History:   Procedure Laterality Date   • APPENDECTOMY     • BREAST BIOPSY Right     years ago when she was 23   • BREAST BIOPSY Right 03/13/2023   • BREAST LUMPECTOMY Right 5/30/2023    Procedure: RIGHT BREAST KRIS  DIRECTED LUMPECTOMY - [facility];  Surgeon: Doyle Feliciano MD;  Location: [facility];  Service: Surgical Oncology   • CYSTOSCOPY W/ LASER LITHOTRIPSY Left 07/12/2016    Procedure: CYSTOSCOPY URETEROSCOPY WITH LITHOTRIPSY HOLMIUM LASER, RETROGRADE PYELOGRAM AND INSERTION STENT URETERAL;  Surgeon: Bethany Andrea MD;  Location: [facility];  Service:    • DILATION AND CURETTAGE OF UTERUS     • IR THORACENTESIS  03/18/2022   • JOINT REPLACEMENT      right knee   • KNEE ARTHROPLASTY Right • MAMMO NEEDLE LOCALIZATION LEFT (ALL INC) EACH ADD Right 4/24/2023   • MAMMO STEREOTACTIC BREAST BIOPSY RIGHT (ALL INC) Right 03/13/2023    High Risk Lesion   • WV ARTHROPLASTY GLENOHUMERAL JOINT TOTAL SHOULDER Left 03/01/2022    Procedure: ARTHROPLASTY SHOULDER REVERSE;  Surgeon: Rai Farah MD;  Location: [facility];  Service: Orthopedics   • WV CYSTO BLADDER W/URETERAL CATHETERIZATION Left 06/29/2016    Procedure: CYSTOSCOPY RETROGRADE PYELOGRAM WITH INSERTION STENT URETERAL, left;  Surgeon: Bonilla Garcia MD;  Location: [facility];  Service: Urology   • SHOULDER ARTHROTOMY Left          Summary   Patient presents with mod-severe expressive and at least mod receptive aphasia c/b reduced naming, ID objects/pictures, ability to follow commands, ability to complete automatic speech tasks. Cannot r/o visual impairment impacting her overall participation. While she does have some appropriate spontaneous speech unfortunately her Y/N answers are unreliable. Her speech is characterized primarily by perseverations of a few words/phrases ( "ok", "I'm sorry") with occasional semantic paraphasias. Recommendation: Ongoing ST is recommended- if this is 2' CVA and does not resolve then ST is likely indicated at the next level of care as well. Consider use of simple commands with visual model. Ask simple Y/N questions with visual choices to confirm when able. Patient's goal:   Unable to state      Goals:    Patient will follow 1-step commands with 80% acc given verbal and visual cues. Patient will answer simple Y/N questions accurately in 75% opps given verbal and visual cues. Patient will participate in sentence completion tasks with 80% acc given verbal and visual cues. Patient will naming objects in 60% of opp given verbal, visual and phonemic cues.     Current Medical:     See BSE from today      General Cognitive Status:  Alert with occ reduced attention    Auditory Comprehension:  Body part ID: 0%  Left vs Right Body part: 0%  One step commands: 60%  Two step commands: 0%  Complex or 3 step commands: 0%  Picture ID:  unable  Letter ID: unable  Personal y/n ?'s: 50%  Simple y/n ?'s: 50%  Complex y/n ?'s: 50%  Paragraph Level Comprehension:DNT  Comprehension of Conversation:  Subjectively appears to understand some simple conversational tasks    Reading Comprehension:  Single word comprehension/Match picture given choice of 2 words: unable   Simple direction following: unable  Sentence comprehension: unable  Functional comprehension: some  Paragraph comprehension: DNT    Oral Expression:  Auto Sequences:   Count Forwards: unable  Count Backwards: unable  Days[de-identified] unable   Months unable  Automatic Social Utterances: occ spontaneous appropriate utterances  Word Repetition: unable  Phrase Completion: simple ones with phonemic cues  Confrontation Namin/10  Responsive Namin  Divergent Namin  Picture Description: unable  Conversation: v limited  Able to make basic needs known:  occasionally    Written Expression:  Name: unable  Address: unable  Phone #: unable  Words to dictation: unable  Sentence to dictation: DNT  Sentence formulation:  DNT    Motor Speech:  No impairment identified    Orientation:  Person: no  Place: Hospital, specifically [facility]  City: no  Time of Day: no  Month: no  JASMYNE: no  Year:  no  Reason for hospitalization:no    Cognitive -linguistic skills:  DNT    Also noted:  Semantic paraphasias  Perseveration  partially aware of deficits    Results discussed with:   RK, Neurology, RN, and ANDERSON Madsen M.S., [facility]  Speech Language Pathologist   Available via [address] [place] [address] #[id number]  [place] #[id number]

## 2023-08-17 NOTE — SPEECH THERAPY NOTE
Speech-Language Pathology Bedside Swallow Evaluation        Patient Name: Debe Crigler    UOPTF'W Date: 8/17/2023     Problem List  Patient Active Problem List   Diagnosis   • Moderate persistent asthma without complication   • Morbid obesity with BMI of 45.0-49.9, adult ([facility])   • Lower leg edema   • Paroxysmal atrial fibrillation ([facility])   • Mixed hyperlipidemia   • Anemia   • Essential hypertension   • PONV (postoperative nausea and vomiting)   • Diabetes mellitus, type 2 ([facility])   • Gastroesophageal reflux disease   • Nephrolithiasis   • Arthritis   • S/P total knee replacement, right   • On continuous oral anticoagulation - eliquis   • Status post reverse total replacement of left shoulder   • Acute kidney injury superimposed on chronic kidney disease ([facility])   • Peripheral venous insufficiency   • Raynaud's disease   • Lung nodule   • Septic shock ([facility])   • Chronic diastolic heart failure ([facility])   • Multiple pulmonary nodules determined by computed tomography of lung   • Mass overlapping multiple quadrants of right breast   • Pressure injury of deep tissue of sacral region   • Toxic metabolic encephalopathy   • Obesity hypoventilation syndrome ([facility])   • KATRINA (obstructive sleep apnea)   • Chronic respiratory failure with hypoxia and hypercapnia ([facility])   • Platelets decreased ([facility])   • PSVT (paroxysmal supraventricular tachycardia) ([facility])   • Rash   • Impaired mobility and ADLs   • Pressure injury of sacral region, stage 2 ([address])   • Fall   • Sepsis due to pneumonia ([address])   • Syncope   • Type 2 diabetes mellitus with hyperglycemia, without long-term current use of insulin ([facility])   • Allergies   • Abnormal mammogram   • Intraductal papilloma of right breast   • Ductal carcinoma in situ (DCIS) of right breast   • Bacteriuria   • Cellulitis       Past Medical History  Past Medical History:   Diagnosis Date   • SEBASTIAN (acute kidney injury) ([address]) 01/23/2023   • Anemia    • Asthma    • Atrial fibrillation ([facility])    • Chronic kidney disease    • COVID-19     January 2022   • Diabetes mellitus ([address])    • GERD (gastroesophageal reflux disease)    • Hyperlipidemia    • Hypertension    • PONV (postoperative nausea and vomiting)    • Sleep apnea     wear BIPAP   • SVT (supraventricular tachycardia) ([facility])        Past Surgical History  Past Surgical History:   Procedure Laterality Date   • APPENDECTOMY     • BREAST BIOPSY Right     years ago when she was 23   • BREAST BIOPSY Right 03/13/2023   • BREAST LUMPECTOMY Right 5/30/2023    Procedure: RIGHT BREAST KRIS  DIRECTED LUMPECTOMY - [facility];  Surgeon: Jyoti Burnette MD;  Location: [facility];  Service: Surgical Oncology   • CYSTOSCOPY W/ LASER LITHOTRIPSY Left 07/12/2016    Procedure: CYSTOSCOPY URETEROSCOPY WITH LITHOTRIPSY HOLMIUM LASER, RETROGRADE PYELOGRAM AND INSERTION STENT URETERAL;  Surgeon: Ashlee Fuentes MD;  Location: [facility];  Service:    • DILATION AND CURETTAGE OF UTERUS     • IR THORACENTESIS  03/18/2022   • JOINT REPLACEMENT      right knee   • KNEE ARTHROPLASTY Right    • MAMMO NEEDLE LOCALIZATION LEFT (ALL INC) EACH ADD Right 4/24/2023   • MAMMO STEREOTACTIC BREAST BIOPSY RIGHT (ALL INC) Right 03/13/2023    High Risk Lesion   • [place] ARTHROPLASTY GLENOHUMERAL JOINT TOTAL SHOULDER Left 03/01/2022    Procedure: ARTHROPLASTY SHOULDER REVERSE;  Surgeon: Evette Cespedes MD;  Location: [facility];  Service: Orthopedics   • VT CYSTO BLADDER W/URETERAL CATHETERIZATION Left 06/29/2016    Procedure: CYSTOSCOPY RETROGRADE PYELOGRAM WITH INSERTION STENT URETERAL, left;  Surgeon: Ashlee Fuentes MD;  [facility];  Service: Urology   • SHOULDER ARTHROTOMY Left          Current Medical Status  Pt is a 72 y.o. female who presented to [facility] with AMS. She was brought in by her niece who stated that she had been acting confused since that morning. Patient endorsed feeling SOB x 2 days and c/o waking up with palpitations.  Upon arrival to ED she met sepsis criteria with temp 103, tachycardia, tachypnea, and leukocytosis. There was concern of RLL infiltrate on CXR, although CT chest with only findings of atelectasis. She was initially started on ceftriaxone and zithromax. Patient had one episode of hypotension in ED and was given 30mL/kg IVF based on IBW with improvement in BP. Patient also with SEBASTIAN on admission which has since worsened with creat 2.7. Yesterday she had worsening mentation; patient lethargic and confused, and slow to respond to questions. Also with new tremors and now in Afib with rate low 100s up to 120. Tympanic temperature check upon my arrival, found to be 101. 6. Concern for worsening sepsis. Sepsis alert activated. Had Tmax 103.7 on 8/13. Patient broadened from ceftriaxone to cefepime on 8/14. Culture data has been negative to date. Patient with episode of hypotension with SBP 80s. Concern for worsening infection. ID with request for SLP consult to evaluate for aspiration. A stroke alert was also called due to ? Expressive aphasia. Past medical history:   Please see H&P for details    Special Studies:  8/17 CT stroke brain: No acute intracranial abnormality. Chronic microangiopathic changes. 8/16 CT Chest abd pelvis: CT chest: New trace bilateral pleural effusions and small pericardial effusion. Findings may be sequela of sepsis, third spacing, or pulmonary vascular congestion congestion. Trace bilateral lower lobe dependent subsegmental atelectasis slightly increased likely due to new pleural effusions. No airspace disease to suggest pneumonia. Incidental partially visualized right breast loculated fluid collection measuring at least 11 cm. Given history of right breast surgery, this may represent chronic seroma or hematoma. Additional chronic findings and negatives as above. CT abdomen and pelvis: No evidence of acute abdominopelvic process. Colonic diverticulosis.  Additional chronic findings and negatives as above. 8/14 CT Chest wo contrast: No definite pneumonia. Mild septal thickening suggestive of interstitial edema. Dependent atelectasis in the lower lobes. Several subcentimeter nodules, stable since March 2023, likely stable since March 2022 although that exam is compromised by motion. 8/11 CXR: Right base infiltrate and/or atelectasis     Swallow Information   Current Risks for Dysphagia & Aspiration: concern for CVA     Current Symptoms/Concerns: change in respiratory status    Current Diet: regular diet and thin liquids      Baseline Diet: regular diet and thin liquids      Baseline Assessment   Behavior/Cognition: alert    Speech/Language Status: not formally assesswed however appears to have expressive and receptive deficits with difficulty in naming, answering Y/N questions, following commands- also noted to have perseverations "ok" and "i'm sorry"    Patient Positioning: upright in bed    Swallow Mechanism Exam   Facial: difficult to fully assess although R eye ptosis? Labial: unable to test 2/2 limited command following  Lingual: adequate protrusion/ROM - unable to assess for strength howevre appears adequate during functional tasks  Velum: unable to visualize  Mandible: adequate ROM  Dentition: adequate and some missing teeth  Vocal quality:clear/adequate   Volitional Cough: unable to initiate volitional cough   Resp: 2L NC    Consistencies Assessed and Performance   Consistencies Administered: thin liquids, puree, soft solids, hard solids and mixed consistency  Specific materials administered included: applesauce, rice krispie cereal in thin milk, mixed fruit ( pineapple, grapes, melon) and thin liquids + 1 bite of meatloaf    Oral Stage:  Straw suck was adequate when utilized appropriately ( often bit the straw). Bolus retrieval/spoon stripping was adequate. No anterior spill noted. Mastication was prolonged and at times incomplete.  Bolus formation and transfer were slow functional with soft solids with no significant oral residue noted. Oral retention was noted on the R with regular solids- requiring finger sweep to clear. No overt s/s reduced oral control. Pharyngeal Stage:   Swallowing initiation appeared prompt. Laryngeal rise was palpated and judged to be within functional limits. No coughing, throat clearing, change in vocal quality or respiratory status noted today. Esophageal Concerns: none reported    Summary   Patient presents with mild-mod oral dysphagia- in part due to inattention but cannot r/o sensory deficit. Cannot r/o silent aspiration. She does appear to have components of exp and rec aphasia- see other report from today.      Recommendations: mechanically altered/level 2 diet and thin liquids     Recommended Form of Meds: as tolerated     Aspiration precautions and compensatory swallowing strategies: upright posture, only feed when fully alert, small bites/sips and alternating bites and sips    Results Reviewed with: patient, RN and Neurology service     Dysphagia Goals: pt will tolerate regular textures with thin liquids without s/s of aspiration x3      Plan  Will f/u    Everett Byrd M.S., CCC-SLP  Speech Language Pathologist   Available via [address] [place] [address] #[id number]  [place] #[id number]

## 2023-08-17 NOTE — PROGRESS NOTES
Progress Note - Infectious Disease   Nany Kaur 72 y.o. female MRN: [id number]  Unit/Bed#: ICU 01 Encounter: [id number]      Impression/Plan:  1. Severe sepsis, presenting with fever of 103 F, tachycardia, tachypnea and leukocytosis and now hypotension worsening. Initially presumed pneumonia. Continues to spike fever and now hypotension despite 6 days of IV cephalosporins. Blood cultures x 6, urinary antigens, RSV/FLU/COVID19 screen negative. 8/14/23 CT chest no definite pneumonia. procal 0.62. unclear source, possible aspiration. 8/16/23 CT with small pleural effusions.   -continue Vancomycin IV renal dosed by pharmacy and Flagyl  mg q 8 hours for now  -follow up MRSA screen  -follow-up cultures and adjust antibiotics as needed  -discontinue Cefepime in setting of #2 and having completed 7 day cephalosporin course today.  -monitor temperature and hemodynamics  -serial exam  -monitor am labs  -aspiration precautions     2. Encephalopathy. With increased lethargy/altered mental status. Consider aspiration. Consider medication side effect. 8/17/23 CT brain: no acute intracranial abnormality.  -discontinue Cefepime  -Management/work-up as above  -check swallow evaluation  -aspiration precautions  -Consider CT head imaging     3. SEBASTIAN on CKD. Peak creatinine 2.91 > 2.39. Baseline 1.7  -renal dose adjust antibiotic as needed  -volume management per nephrology  -Vancomycin dosing per pharmacy  -recheck BMP     4. Type 2 diabetes mellitus. Hemoglobin A1c on 8/13/2023 9.3. Risk factor for infection  -Blood glucose management per primary care team     5. Morbid Obesity. BMI 49.6. Can affect antibiotic absorption/dosing     6. 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 Radiation was to [facility] at [facility] 8/16/2023. Inframammary bilateral intertrigo with powder applied.   8/16/23 CT chest: right breast loculated fluid collection 11cm  -serial exam  -continue Vancomycin  -recommend surgical evaluation     Antibiotics:  Cefepime D4 - abx D7  Vancomycin/Flagyl D2     Above impression and plan discussed in detail with patient, RN, and Dalia Amaya from critical care team.    Subjective:  Patient has declining temperature last 24 hours, no reported chills, sweats; no nausea, vomiting, diarrhea; no cough, shortness of breath; no pain complaints, denies right breast pain/tenderness on exam. On dysphagia 2 diet. Objective:  Vitals:  Temp:  [96.5 °F (35.8 °C)-100 °F (37.8 °C)] 96.5 °F (35.8 °C)  HR:  [] 85  Resp:  [16-37] 16  BP: ()/(34-75) 133/74  SpO2:  [93 %-98 %] 97 %  Temp (24hrs), Av.6 °F (37 °C), Min:96.5 °F (35.8 °C), Max:100 °F (37.8 °C)  Current: Temperature: (!) 96.5 °F (35.8 °C)    Physical Exam:   General Appearance:  72year old female, acute on chronically debilitated, propped in ICU SD bed, arousable to name, primarily answers "ok" throughout exam, some confusion, no acute distress. HEENT: Atraumatic normocephalic   Throat: Oropharynx moist.   Pulmonary:   Decreased breath sounds, Normal respiratory excursion without accessory muscle use, statting 97% on RA   Cardiac:  RRR   Abdomen:   Soft, non-tender, protuberant pannus, + bowel sounds   Extremities: No edema   : Purewick with clear, yellow urine in canister, no SPT   Psychiatric: Awake, cooperative   Skin: No new rashes. IV site nontender.  Inframammary intertrigo drying with powder applied, no point tenderness, fluctuance at right breast post op healed incision/induration       Labs, Imaging, & Other studies:   All pertinent labs and imaging studies were personally reviewed  Results from last 7 days   Lab Units 23  0547 23  1322 23  0455   WBC Thousand/uL 14.72* 13.31* 16.06*   HEMOGLOBIN g/dL 10.1* 10.0* 10.3*   PLATELETS Thousands/uL 184 185 173     Results from last 7 days   Lab Units 23  0809 23  1322 23  0455 23  0512 23  1236   SODIUM mmol/L 139 139 140   < > 137   POTASSIUM mmol/L 4.9 4.7 5.5*   < > 4.5   CHLORIDE mmol/L 106 103 104   < > 97   CO2 mmol/L 26 28 28   < > 33*   BUN mg/dL 65* 67* 65*   < > 35*   CREATININE mg/dL 2.39* 2.71* 2.70*   < > 1.64*   EGFR ml/min/1.73sq m 19 17 17   < > 31   CALCIUM mg/dL 8.6 8.8 8.5   < > 9.0   AST U/L  --  9*  --   --  15   ALT U/L  --  8  --   --  12   ALK PHOS U/L  --  43  --   --  54    < > = values in this interval not displayed. Results from last 7 days   Lab Units 08/16/23  1349 08/16/23  1322 08/13/23  1956 08/12/23  0557 08/11/23  1308 08/11/23  1236   BLOOD CULTURE  Received in Microbiology Lab. Culture in Progress. Received in Microbiology Lab. Culture in Progress. No Growth at 72 hrs. No Growth at 72 hrs.  --   --  No Growth After 5 Days. No Growth After 5 Days.    URINE CULTURE   --   --   --   --  >100,000 cfu/ml Gardnerella vaginalis*  <10,000 cfu/ml  --    LEGIONELLA URINARY ANTIGEN   --   --   --  Negative  --   --      Results from last 7 days   Lab Units 08/17/23  0547 08/16/23  1322 08/15/23  0432 08/14/23  0559 08/12/23  0512 08/11/23  1236   PROCALCITONIN ng/ml 0.56* 0.62* 0.36* 0.42* 0.23 0.08         Results from last 7 days   Lab Units 08/16/23  0455   FERRITIN ng/mL 168

## 2023-08-17 NOTE — ASSESSMENT & PLAN NOTE
· Right lateral breast with edematous, erythematous skin.  Appears consistent with cellulitis  · No outward drainage or open areas noted  · Continue antibiotics as outlined above  · Local skin care

## 2023-08-17 NOTE — PROGRESS NOTES
Ynes Landon is a 72 y.o. female who is currently ordered Vancomycin IV with management by the Pharmacy Consult service. Relevant clinical data and objective / subjective history reviewed. Vancomycin Assessment:  Indication and Goal AUC/Trough: Pneumonia (goal -600, trough >10), -600, trough >10  Clinical Status: stable  Micro:     Renal Function:  SCr: 2.39 mg/dL  CrCl: 27.8 mL/min  Renal replacement: Not on dialysis  Days of Therapy: 2  Current Dose: 2000 mg IV once  Vancomycin Plan:  New Dosin mg IV prn trough < 15  Estimated AUC: N/A mcg*hr/mL  Estimated Trough: T>15 mcg/mL  Next Level: 2023 at 0600  Renal Function Monitoring: Daily BMP and UOP  Patient's trough came back at 11.4 so they will receive a 1250 mg one time dose today. Pharmacy will continue to follow closely for s/sx of nephrotoxicity, infusion reactions and appropriateness of therapy. BMP and CBC will be ordered per protocol. We will continue to follow the patient’s culture results and clinical progress daily.     Rosmery Renee, Pharmacist

## 2023-08-17 NOTE — PROGRESS NOTES
[id number] [facility]  Interval Progress Note: Critical Care  Name: Millie Romero  MRN: [id number]  Unit/Bed#: ICU 01 I Date of Admission: 8/11/2023   Date of Service: 8/17/2023 I Hospital Day: 6    Interval Events:    On AM assessment, patient only answers "okay" to orientation questions, does not answer orientation questions, does not lift arms off bed. Previous notes indicate she is awake and able to answer orientation questions. Pertinent New Data:   blood pressure, pulse, temperature, respirations and pulse oximetry    Physical Exam  Neurological:      Comments: Awake, alert, maintaining her airway  Answers "okay" to all questions  Does not follow commands to lift arms from bed, no facial droop noted  Lifts legs from bed to command, though weakly   Cannot assess ataxia 2/2 lack of ability to follow commands  Cannot assess sensation 2/2 lack of verbal response   Vitals and nursing note reviewed. I have personally reviewed pertinent lab results. CVA alert pending      Assessment and Plan  · Diagnosis: Aphasia, weakness, acute   · Plan:  · LKW 0720 this am during scheduled glucose rounds with tech partner who states patient was awake and alert, having appropriate conversation regarding her breakfast   · On my assessment at 0820, she is awake, alert, maintaining her airway. She answers "okay" to all questions. She does not follow commands to lift arms from bed, no facial droop noted. Lifts legs from bed to command, though weakly. Cannot assess ataxia 2/2 lack of ability to follow commands. Cannot assess sensation 2/2 lack of verbal response. · CVA Alert activated.  Not a TPA candidate given daily eliquis   · STAT CTH   · Cannot get CTA 2/2 SEBASTIAN   · Cannot get MRI 2/2 L shoulder replacement  · Appreciate neuro input   · Start ASA 81   · On statin, though may consider increase in dosing   · Hold eliquis until repeat CTH   · Will need CTH for reeval given lack of ability to obtain MRI · Echo   · Stroke pathway per protocol   · Will also obtain spot EEG to r/o subclinical seizures  · Dr. Omar Fan updated in real-time        Billing Level:  During this visit, Critical care services were medically necessary as this patient had a high probability of imminent or life-threatening deterioration due to acute mental status change, required my direct attention, intervention, and personal management to implement the following: bedside management, test review, records review, case discussed with consultants , direct patient care and documentation of findings. I have personally provided 35 minutes on 08/17/23 of critical care time, exclusive of procedures, teaching, family meetings, and any prior time recorded by providers other than myself.     SIGNATURE: RK Barr

## 2023-08-17 NOTE — PLAN OF CARE
Problem: RESPIRATORY - ADULT  Goal: Achieves optimal ventilation and oxygenation  Description: INTERVENTIONS:  - Assess for changes in respiratory status  - Assess for changes in mentation and behavior  - Position to facilitate oxygenation and minimize respiratory effort  - Oxygen administered by appropriate delivery if ordered  - Initiate smoking cessation education as indicated  - Encourage broncho-pulmonary hygiene including cough, deep breathe, Incentive Spirometry  - Assess the need for suctioning and aspirate as needed  - Assess and instruct to report SOB or any respiratory difficulty  - Respiratory Therapy support as indicated  Outcome: Progressing     Problem: INFECTION - ADULT  Goal: Absence or prevention of progression during hospitalization  Description: INTERVENTIONS:  - Assess and monitor for signs and symptoms of infection  - Monitor lab/diagnostic results  - Monitor all insertion sites, i.e. indwelling lines, tubes, and drains  - Monitor endotracheal if appropriate and nasal secretions for changes in amount and color  - Winn appropriate cooling/warming therapies per order  - Administer medications as ordered  - Instruct and encourage patient and family to use good hand hygiene technique  - Identify and instruct in appropriate isolation precautions for identified infection/condition  Outcome: Progressing  Goal: Absence of fever/infection during neutropenic period  Description: INTERVENTIONS:  - Monitor WBC    Outcome: Progressing     Problem: SAFETY ADULT  Goal: Patient will remain free of falls  Description: INTERVENTIONS:  - Educate patient/family on patient safety including physical limitations  - Instruct patient to call for assistance with activity   - Consult OT/PT to assist with strengthening/mobility   - Keep Call bell within reach  - Keep bed low and locked with side rails adjusted as appropriate  - Keep care items and personal belongings within reach  - Initiate and maintain comfort rounds  - Make Fall Risk Sign visible to staff  - Offer Toileting every 2 Hours, in advance of need  - Initiate/Maintain bed alarm  - Obtain necessary fall risk management equipment: bed alarm  - Apply yellow socks and bracelet for high fall risk patients  - Consider moving patient to room near nurses station  Outcome: Progressing  Goal: Maintain or return to baseline ADL function  Description: INTERVENTIONS:  -  Assess patient's ability to carry out ADLs; assess patient's baseline for ADL function and identify physical deficits which impact ability to perform ADLs (bathing, care of mouth/teeth, toileting, grooming, dressing, etc.)  - Assess/evaluate cause of self-care deficits   - Assess range of motion  - Assess patient's mobility; develop plan if impaired  - Assess patient's need for assistive devices and provide as appropriate  - Encourage maximum independence but intervene and supervise when necessary  - Involve family in performance of ADLs  - Assess for home care needs following discharge   - Consider OT consult to assist with ADL evaluation and planning for discharge  - Provide patient education as appropriate  Outcome: Progressing  Goal: Maintains/Returns to pre admission functional level  Description: INTERVENTIONS:  - Perform BMAT or MOVE assessment daily.   - Set and communicate daily mobility goal to care team and patient/family/caregiver. - Collaborate with rehabilitation services on mobility goals if consulted  - Perform Range of Motion 3 times a day. - Reposition patient every 2 hours.   - Dangle patient 3 times a day  - Stand patient 3 times a day  - Ambulate patient 3 times a day  - Out of bed to chair 3 times a day   - Out of bed for meals 3 times a day  - Out of bed for toileting  - Record patient progress and toleration of activity level   Outcome: Progressing     Problem: DISCHARGE PLANNING  Goal: Discharge to home or other facility with appropriate resources  Description: INTERVENTIONS:  - Identify barriers to discharge w/patient and caregiver  - Arrange for needed discharge resources and transportation as appropriate  - Identify discharge learning needs (meds, wound care, etc.)  - Arrange for interpretive services to assist at discharge as needed  - Refer to Case Management Department for coordinating discharge planning if the patient needs post-hospital services based on physician/advanced practitioner order or complex needs related to functional status, cognitive ability, or social support system  Outcome: Progressing     Problem: Knowledge Deficit  Goal: Patient/family/caregiver demonstrates understanding of disease process, treatment plan, medications, and discharge instructions  Description: Complete learning assessment and assess knowledge base.   Interventions:  - Provide teaching at level of understanding  - Provide teaching via preferred learning methods  Outcome: Progressing     Problem: CARDIOVASCULAR - ADULT  Goal: Maintains optimal cardiac output and hemodynamic stability  Description: INTERVENTIONS:  - Monitor I/O, vital signs and rhythm  - Monitor for S/S and trends of decreased cardiac output  - Administer and titrate ordered vasoactive medications to optimize hemodynamic stability  - Assess quality of pulses, skin color and temperature  - Assess for signs of decreased coronary artery perfusion  - Instruct patient to report change in severity of symptoms  Outcome: Progressing  Goal: Absence of cardiac dysrhythmias or at baseline rhythm  Description: INTERVENTIONS:  - Continuous cardiac monitoring, vital signs, obtain 12 lead EKG if ordered  - Administer antiarrhythmic and heart rate control medications as ordered  - Monitor electrolytes and administer replacement therapy as ordered  Outcome: Progressing     Problem: METABOLIC, FLUID AND ELECTROLYTES - ADULT  Goal: Electrolytes maintained within normal limits  Description: INTERVENTIONS:  - Monitor labs and assess patient for signs and symptoms of electrolyte imbalances  - Administer electrolyte replacement as ordered  - Monitor response to electrolyte replacements, including repeat lab results as appropriate  - Instruct patient on fluid and nutrition as appropriate  Outcome: Progressing  Goal: Fluid balance maintained  Description: INTERVENTIONS:  - Monitor labs   - Monitor I/O and WT  - Instruct patient on fluid and nutrition as appropriate  - Assess for signs & symptoms of volume excess or deficit  Outcome: Progressing  Goal: Glucose maintained within target range  Description: INTERVENTIONS:  - Monitor Blood Glucose as ordered  - Assess for signs and symptoms of hyperglycemia and hypoglycemia  - Administer ordered medications to maintain glucose within target range  - Assess nutritional intake and initiate nutrition service referral as needed  Outcome: Progressing     Problem: SKIN/TISSUE INTEGRITY - ADULT  Goal: Skin Integrity remains intact(Skin Breakdown Prevention)  Description: Assess:  -Perform Hung assessment every shift  -Clean and moisturize skin every shift  -Inspect skin when repositioning, toileting, and assisting with ADLS  -Assess under medical devices such as needed every 2 hour  -Assess extremities for adequate circulation and sensation     Bed Management:  -Have minimal linens on bed & keep smooth, unwrinkled  -Change linens as needed when moist or perspiring  -Avoid sitting or lying in one position for more than 45 hours while in bed  -Keep HOB at 45 degrees     Toileting:  -Offer bedside commode  -Assess for incontinence every 2 hours  -Use incontinent care products after each incontinent episode such as wrap    Activity:  -Mobilize patient 3 times a day  -Encourage activity and walks on unit  -Encourage or provide ROM exercises   -Turn and reposition patient every 2 Hours  -Use appropriate equipment to lift or move patient in bed  -Instruct/ Assist with weight shifting every 1 hour when out of bed in chair  -Consider limitation of chair time 2 hour intervals    Skin Care:  -Avoid use of baby powder, tape, friction and shearing, hot water or constrictive clothing  -Relieve pressure over bony prominences using nav  -Do not massage red bony areas    Next Steps:  -Teach patient strategies to minimize risks such as shifting  -Consider consults to  interdisciplinary teams such as PT/OT  Outcome: Progressing  Goal: Incision(s), wounds(s) or drain site(s) healing without S/S of infection  Description: INTERVENTIONS  - Assess and document dressing, incision, wound bed, drain sites and surrounding tissue  - Provide patient and family education  - Perform skin care/dressing changes every shift  Outcome: Progressing  Goal: Pressure injury heals and does not worsen  Description: Interventions:  - Implement low air loss mattress or specialty surface (Criteria met)  - Apply silicone foam dressing  - Instruct/assist with weight shifting every 30 minutes when in chair   - Limit chair time to 2 hour intervals  - Use special pressure reducing interventions such as shifting when in chair   - Apply fecal or urinary incontinence containment device   - Perform passive or active ROM every 3 hours  - Turn and reposition patient & offload bony prominences every 2 hours   - Utilize friction reducing device or surface for transfers   - Consider consults to  interdisciplinary teams such as PT/OT  - Use incontinent care products after each incontinent episode such as wrap  - Consider nutrition services referral as needed  Outcome: Progressing     Problem: MOBILITY - ADULT  Goal: Maintain or return to baseline ADL function  Description: INTERVENTIONS:  -  Assess patient's ability to carry out ADLs; assess patient's baseline for ADL function and identify physical deficits which impact ability to perform ADLs (bathing, care of mouth/teeth, toileting, grooming, dressing, etc.)  - Assess/evaluate cause of self-care deficits   - Assess range of motion  - Assess patient's mobility; develop plan if impaired  - Assess patient's need for assistive devices and provide as appropriate  - Encourage maximum independence but intervene and supervise when necessary  - Involve family in performance of ADLs  - Assess for home care needs following discharge   - Consider OT consult to assist with ADL evaluation and planning for discharge  - Provide patient education as appropriate  Outcome: Progressing  Goal: Maintains/Returns to pre admission functional level  Description: INTERVENTIONS:  - Perform BMAT or MOVE assessment daily.   - Set and communicate daily mobility goal to care team and patient/family/caregiver. - Collaborate with rehabilitation services on mobility goals if consulted  - Perform Range of Motion 3 times a day. - Reposition patient every 2 hours.   - Dangle patient 3 times a day  - Stand patient 3 times a day  - Ambulate patient 3 times a day  - Out of bed to chair 3 times a day   - Out of bed for meals 3 times a day  - Out of bed for toileting  - Record patient progress and toleration of activity level   Outcome: Progressing     Problem: Prexisting or High Potential for Compromised Skin Integrity  Goal: Skin integrity is maintained or improved  Description: INTERVENTIONS:  - Identify patients at risk for skin breakdown  - Assess and monitor skin integrity  - Assess and monitor nutrition and hydration status  - Monitor labs   - Assess for incontinence   - Turn and reposition patient  - Assist with mobility/ambulation  - Relieve pressure over bony prominences  - Avoid friction and shearing  - Provide appropriate hygiene as needed including keeping skin clean and dry  - Evaluate need for skin moisturizer/barrier cream  - Collaborate with interdisciplinary team   - Patient/family teaching  - Consider wound care consult   Outcome: Progressing

## 2023-08-17 NOTE — PROGRESS NOTES
[id number] [facility]  Progress Note  Name: Candice Flores  MRN: [id number]  Unit/Bed#: ICU 01 I Date of Admission: 8/11/2023   Date of Service: 8/17/2023 I Hospital Day: 6    Assessment/Plan   * Septic shock [facility])  Assessment & Plan  · As evidenced by fever 101.6, tachycardia, tachypnea, and leukocytosis  · Unclear source at this time  · Initially presumed to be pneumonia, also with bacteriuria noted, and area of cellulitic skin changes on right lateral breast  · CXR 8/11 with concern for RLL infiltrate  · CT chest 8/14 bibasilar atelectasis   · Repeat CXR today with bibasilar atelectasis vs infiltrates  · CT C/A/P to r/o occult source of infection- appears unchanged from prior. Formal report pending  · Hypotension with SBP 80  · 30mL IVF of IBW ordered  · MAP goal > 65  · BP improved with IVF  · Procal elevated at 0.62 from 0.36, continue to trend   · Persistent leukocytosis (WBC 13), continue to trend daily  · ATC Tylenol for fever control   · MRSA nasal surveillance pending  · Continue cefepime, add vanco for MRSA coverage, and flagyl for ? UTI  · UA unremarkable   · Repeat BC x 2  · Blood cultures from admission 8/11 negative x 4 days   · Strep pneumo and legionella urine antigens negative  · Obtain sputum culture if able     Toxic metabolic encephalopathy  Assessment & Plan  · Secondary to fever, sepsis, and SEBASTIAN/uremia   · ATC tylenol for fever control  · IVF resuscitation/sepsis treatment as above  · Neuro checks q4h  · Delirium precautions; regulate sleep/wake cycle, environmental controls, daily CAM ICU     Acute kidney injury superimposed on chronic kidney disease [facility])  Assessment & Plan  Lab Results   Component Value Date    EGFR 17 08/16/2023    EGFR 17 08/16/2023    EGFR 15 08/15/2023    CREATININE 2.71 (H) 08/16/2023    CREATININE 2.70 (H) 08/16/2023    CREATININE 2.91 (H) 08/15/2023     · Baseline creatinine appears to be between 1.3-1.7  · Admission creatinine 1.64  · Peak creatinine 2.91, now currently 2.71  · D/C IV Lasix (received 40mg x 2 8/15 and once 8/16 AM)   · Hold home demadex   · S/p 30ml/kg IVF yesterday  · Trend BUN/creatinine  · Avoid nephrotoxic agents  · Strict I&O  · U/S kidneys/bladder 8/15-Unremarkable kidneys without evidence of hydronephrosis    Chronic respiratory failure with hypoxia and hypercapnia (HCC)  Assessment & Plan  · Moderate persistent asthma/KATRINA   · CPAP @ HS with 1-2L supplemental O2  · No O2 requirement during the day  · Follows with pulm outpatient   · Currently on 2LNC   · Goal O2 sat 90% or above   · Pulmonary toilet; encourage coughing and deep breathing, IS q1h while awake     Type 2 diabetes mellitus with hyperglycemia, without long-term current use of insulin [facility])  Assessment & Plan  Lab Results   Component Value Date    HGBA1C 9.3 (H) 08/13/2023       Recent Labs     08/16/23  1102 08/16/23  1723 08/16/23  2104 08/17/23  0204   POCGLU 229* 92 127 133       Blood Sugar Average: Last 72 hrs:  (P) 173.75     · Continue home regimen 10 units humalog with meals and 25 units lantus @ HS + SSI coverage ACHS  · Goal blood glucose less than 180     Bacteriuria  Assessment & Plan  · UA with 4-10 WBC but no bacteria seen 8/11  · Urine culture 8/11 grew 100K gardnerella vaginalis, ? Colonization- initially denied dysuria. · Continue Flagyl 500 q8h for now    Moderate persistent asthma without complication  Assessment & Plan  · Continue home fluticasone furoate-vilanterol inhaler daily  · Continue daily singulair  · Albuterol inhaler PRN     Cellulitis  Assessment & Plan  · Right lateral breast with edematous, erythematous skin.  Appears consistent with cellulitis  · No outward drainage or open areas noted  · Continue antibiotics as outlined above  · Local skin care    Paroxysmal atrial fibrillation (HCC)  Assessment & Plan  · Continue home Eliquis for [facility]  · Hold metoprolol secondary to hypotension  · Optimize electrolytes  · Monitor rhythm on tele     KATRINA (obstructive sleep apnea)  Assessment & Plan  · Continue CPAP 7 @ HS     Ductal carcinoma in situ (DCIS) of right breast  Assessment & Plan  · Dx March 2023  · S/p lumpectomy 5/30/2023  · Follows with Dr. Garland Grullon outpatient  · Plan for radiation to start soon     Mixed hyperlipidemia  Assessment & Plan  · Continue home statin     Morbid obesity with BMI of 45.0-49.9, adult ([address])  Assessment & Plan  · Encourage diet and lifestyle modification  · Nutrition consult     Essential hypertension  Assessment & Plan  · Hold home metoprolol secondary to hypotension in setting of sepsis            ICU Core Measures     A: Assess, Prevent, and Manage Pain · Has pain been assessed? Yes  · Need for changes to pain regimen? No   B: Both SAT/SAT  · N/A   C: Choice of Sedation · RASS Goal: N/A patient not on sedation  · Need for changes to sedation or analgesia regimen? NA   D: Delirium · CAM-ICU: Negative   E: Early Mobility  · Plan for early mobility? Yes   F: Family Engagement · Plan for family engagement today? Yes       Antibiotic Review: Awaiting culture results. Prophylaxis:  VTE VTE covered by:  apixaban, Oral, 5 mg at 08/16/23 1749       Stress Ulcer  covered bypantoprazole (PROTONIX) EC tablet 40 mg [[id number]]       Subjective    "I'm alright"    HPI/24hr events: Transferred to ICU yesterday in setting of hypotension and encephalopathy while febrile and tachycardic. Responded to IVF bolus. Repeat cultures pending. Fevers improved overnight with scheduled tylenol. Encephalopathy improving but still intermittently lethargic with some mild confusion. No additional changes. Review of Systems   Constitutional: Positive for fatigue. Respiratory: Negative for cough and shortness of breath. Cardiovascular: Positive for leg swelling. Negative for chest pain and palpitations. Gastrointestinal: Negative. Genitourinary: Negative for dysuria. All other systems reviewed and are negative.          Objective Vitals I/O      Most Recent Min/Max in 24hrs   Temp (!) 97 °F (36.1 °C) Temp  Min: 97 °F (36.1 °C)  Max: 101.2 °F (38.4 °C)   Pulse 76 Pulse  Min: 55  Max: 134   Resp (!) 23 Resp  Min: 18  Max: 37   /59 BP  Min: 84/45  Max: 138/74   O2 Sat 96 % SpO2  Min: 92 %  Max: 98 %      Intake/Output Summary (Last 24 hours) at 2023 0443  Last data filed at 2023 1920  Gross per 24 hour   Intake 2700 ml   Output 600 ml   Net 2100 ml         Diet Ephraim/CHO Controlled; Consistent Carbohydrate Diet Level 2 (5 carb servings/75 grams CHO/meal); Potassium 2 GM     Invasive Monitoring Physical exam   N/A Physical Exam  Eyes:      General: Lids are normal.   Skin:     General: Skin is warm and dry. HENT:      Head: Normocephalic. Nose: No congestion. Mouth/Throat:      Mouth: Mucous membranes are moist.   Cardiovascular:      Rate and Rhythm: Normal rate and regular rhythm. Pulses:           Radial pulses are 2+ on the right side and 2+ on the left side. Dorsalis pedis pulses are 1+ on the right side and 1+ on the left side. Musculoskeletal:      Right lower le+ Pitting Edema present. Left lower le+ Pitting Edema present. Abdominal:      General: Bowel sounds are normal.      Palpations: Abdomen is soft. Tenderness: There is no abdominal tenderness. Constitutional:       General: She is not in acute distress. Appearance: She is morbidly obese. She is ill-appearing and toxic-appearing. Interventions: Nasal cannula in place. Pulmonary:      Effort: Tachypnea present. No accessory muscle usage, respiratory distress or accessory muscle usage. Breath sounds: Decreased breath sounds present. No wheezing. Psychiatric:         Attention and Perception: She is inattentive (drifts to sleep between some questions). Speech: Speech is delayed. Behavior: Behavior is slowed. Behavior is cooperative.    Neurological:      General: No focal deficit present. Mental Status: She is easily aroused. She is lethargic and disoriented to time. GCS: GCS eye subscore is 3. GCS verbal subscore is 4. GCS motor subscore is 6. Comments: Oriented to place. States "22-23" to year. Oriented to "August" and self. Genitourinary/Anorectal:  external catheterVitals and nursing note reviewed. Diagnostic Studies      EKG: Converted from Afib with rates 130s to NSR on monitor last evening around . Remains NSR with rates in 70s. Imagin/16 CT C/A/P without contrast: Appears similar to prior. B/l posterior atelectasis with no clear focal consolidation. Also with small b/l pleural effusions on my read. Formal report pending.     I have personally reviewed pertinent films in PACS     Medications:  Scheduled PRN   acetaminophen, 650 mg, Q6H SELMA  apixaban, 5 mg, BID  atorvastatin, 20 mg, Daily With Dinner  cefepime, 2 g, Q12H  fluticasone, 1 spray, Daily  Fluticasone Furoate-Vilanterol, 1 puff, Daily  insulin glargine, 25 Units, HS  insulin lispro, 10 Units, TID With Meals  insulin lispro, 2-12 Units, TID AC  insulin lispro, 2-12 Units, HS  insulin lispro, 2-12 Units, 0200  metroNIDAZOLE, 500 mg, Q8H  montelukast, 10 mg, HS  nystatin, , BID  pantoprazole, 40 mg, Daily  saccharomyces boulardii, 250 mg, BID      ammonium lactate, 1 Application, BID PRN  ondansetron, 4 mg, Q6H PRN  vancomycin, 15 mg/kg (Adjusted), Daily PRN       Continuous          Labs:    CBC    Recent Labs     23  0455 23  1322   WBC 16.06* 13.31*   HGB 10.3* 10.0*   HCT 32.6* 31.4*    185     BMP    Recent Labs     23  0455 23  1322   SODIUM 140 139   K 5.5* 4.7    103   CO2 28 28   AGAP 8 8   BUN 65* 67*   CREATININE 2.70* 2.71*   CALCIUM 8.5 8.8       Coags    No recent results     Additional Electrolytes  Recent Labs     23  0847   CAIONIZED 1.08*          Blood Gas    Recent Labs     23  1215   PHART 7.392   NSJ2NDH 43.7   PO2ART 74.6*   PWS2ZIX 26.0   BEART 0.8   SOURCE Radial, Right     Recent Labs     08/16/23  1215   SOURCE Radial, Right    LFTs  Recent Labs     08/16/23  1322   ALT 8   AST 9*   ALKPHOS 43   ALB 3.2*   TBILI 0.41       Infectious  Recent Labs     08/16/23  1322   PROCALCITONI 0.62*     Glucose  Recent Labs     08/16/23  0455 08/16/23  1322   GLUC 120 177*                 Marni King, MARLENENP

## 2023-08-17 NOTE — ASSESSMENT & PLAN NOTE
· UA with 4-10 WBC but no bacteria seen 8/11  · Urine culture 8/11 grew 100K gardnerella vaginalis, ? Colonization- initially denied dysuria.    · Continue Flagyl 500 q8h for now

## 2023-08-17 NOTE — PROGRESS NOTES
Edenilson Douglass is a 72 y.o. female who is currently ordered Vancomycin IV with management by the Pharmacy Consult service. Relevant clinical data and objective / subjective history reviewed. Vancomycin Assessment:  Indication and Goal AUC/Trough: Pneumonia (goal -600, trough >10), -600, trough >10  Clinical Status: New  Micro:   Pending  Renal Function:  SCr: 2.71 mg/dL  CrCl: 23.8 mL/min  Days of Therapy: 1  Current Dose: 2000 mg IV once  Vancomycin Plan:  New Dosin mg IV prn trough < 15  Next Level: 0600 on 2023  Renal Function Monitoring: Daily BMP and UOP  Pharmacy will continue to follow closely for s/sx of nephrotoxicity, infusion reactions and appropriateness of therapy. BMP and CBC will be ordered per protocol. We will continue to follow the patient’s culture results and clinical progress daily.     Dago Gandara, Pharmacist

## 2023-08-17 NOTE — ASSESSMENT & PLAN NOTE
Lab Results   Component Value Date    HGBA1C 9.3 (H) 08/13/2023       Recent Labs     08/16/23  1102 08/16/23  1723 08/16/23  2104 08/17/23  0204   POCGLU 229* 92 127 133       Blood Sugar Average: Last 72 hrs:  (P) 173.75     · Continue home regimen 10 units humalog with meals and 25 units lantus @ HS + SSI coverage ACHS  · Goal blood glucose less than 180

## 2023-08-17 NOTE — OCCUPATIONAL THERAPY NOTE
OT TREATMENT         08/17/23 2917   Note Type   Note Type Treatment   Pain Assessment   Pain Assessment Tool FLACC   Pain Rating: FLACC (Activity) - Face 1   Pain Rating: FLACC (Activity) - Legs 0   Pain Rating: FLACC (Activity) - Activity 1  (L shoulder discomfort with ROM)   Pain Rating: FLACC (Activity) - Cry 1   Pain Rating: FLACC (Activity) - Consolability 1   Score: FLACC (Activity) 4   Restrictions/Precautions   Other Precautions Chair Alarm; Bed Alarm;Cognitive;Multiple lines; Fall Risk;O2   ADL   Eating Assistance 3  Moderate Assistance   Eating Deficit Beverage management   Eating Comments can hold can in L hand, needed assist to bring straw to mouth, verbal cues to take a drink. Grooming Assistance 2  Maximal Assistance   Grooming Comments unable to follow commands to bring arm to face to wash   Bed Mobility   Additional Comments mobility deferred as pt with increased heart rate and ability to follow commands today, nurse deferred mobility   ROM- Right Upper Extremities   R Shoulder AAROM; Flexion   R Elbow AAROM;Elbow flexion;Elbow extension   R Hand PROM; Thumb; Index finger; Long finger;Ring finger;Little finger   R Weight/Reps/Sets 10 times each supine, unable to follow commands for ROM   ROM - Left Upper Extremities    L Shoulder AAROM; Flexion   L Elbow AAROM;Elbow flexion;Elbow extension   L Hand PROM; Thumb; Index finger; Long finger;Ring finger;Little finger   L Weight/Reps/Sets 10 times each supine, unable to follow commands for ROM   Cognition   Overall Cognitive Status Impaired   Arousal/Participation Alert   Attention Difficulty dividing attention   Orientation Level Oriented to person;Disoriented to place; Disoriented to time;Disoriented to situation   Following Commands Follows one step commands inconsistently  (10% of the time)   Comments only verbalization from patient other than yes and ok during session was "I'm freezing"   Additional Activities   Additional Activities Comments 2 family members present for session  (MRI pending, patient will benefit from an OT re-evaluation once medically appropriate)   Activity Tolerance   Medical Staff Made Aware yes, nurse Max Bath   Assessment   Assessment Patient seen for OT treatment. Patient s/p stroke alert this am.  Patient is alert today but very minimal command follow. Patient seems to have expressive aphasia and receptive. Patient unable to complete AROM BUE today. Patient assisted with muscle contraction BUE with AAROM. Patient able to look at therapist and open and close eyes with increased time today. Patient will benefit from continued OT services to maximize functional performance with ADLS. The patient's raw score on the AM-PAC Daily Activity Inpatient Short Form is 8. A raw score of less than 19 suggests the patient may benefit from discharge to post-acute rehabilitation services. Please refer to the recommendation of the Occupational Therapist for safe discharge planning. Plan   Treatment Interventions ADL retraining;UE strengthening/ROM; Endurance training;Patient/family training; Activityengagement;Cognitive reorientation   OT Frequency 3-5x/wk   Recommendation   OT Discharge Recommendation Post acute rehabilitation services   AM-PAC Daily Activity Inpatient   Lower Body Dressing 1   Bathing 1   Toileting 1   Upper Body Dressing 1   Grooming 2   Eating 2   Daily Activity Raw Score 8   Turning Head Towards Sound 3   Follow Simple Instructions 2   Low Function Daily Activity Raw Score 13   Low Function Daily Activity Standardized Score  23.16   AM-PAC Applied Cognition Inpatient   Following a Speech/Presentation 1   Understanding Ordinary Conversation 2   Taking Medications 1   Remembering Where Things Are Placed or Put Away 1   Remembering List of 4-5 Errands 1   Taking Care of Complicated Tasks 1   Applied Cognition Raw Score 7   Applied Cognition Standardized Score 15.17   End of Consult   Patient Position at End of Consult Supine; All needs within reach   Nurse Communication Nurse aware of consult  (nurse present with patient at end of session)   Licensure   [place] License Number  Jodee Franklin [id number] [facility] OTR/L [id number]

## 2023-08-17 NOTE — ASSESSMENT & PLAN NOTE
Lab Results   Component Value Date    EGFR 17 08/16/2023    EGFR 17 08/16/2023    EGFR 15 08/15/2023    CREATININE 2.71 (H) 08/16/2023    CREATININE 2.70 (H) 08/16/2023    CREATININE 2.91 (H) 08/15/2023     · Baseline creatinine appears to be between 1.3-1.7  · Admission creatinine 1.64  · Peak creatinine 2.91, now currently 2.71  · D/C IV Lasix (received 40mg x 2 8/15 and once 8/16 AM)   · Hold home demadex   · S/p 30ml/kg IVF yesterday  · Trend BUN/creatinine  · Avoid nephrotoxic agents  · Strict I&O  · U/S kidneys/bladder 8/15-Unremarkable kidneys without evidence of hydronephrosis

## 2023-08-17 NOTE — PLAN OF CARE
Recommendations: mechanically altered/level 2 diet and thin liquids     Recommended Form of Meds: as tolerated     Aspiration precautions and compensatory swallowing strategies: upright posture, only feed when fully alert, small bites/sips and alternating bites and sips    Dysphagia Goals: pt will tolerate regular textures with thin liquids without s/s of aspiration x3      Consider use of simple commands with visual model. Ask simple Y/N questions with visual choices to confirm when able. Communication Goals:   Patient will follow 1-step commands with 80% acc given verbal and visual cues. Patient will answer simple Y/N questions accurately in 75% opps given verbal and visual cues. Patient will participate in sentence completion tasks with 80% acc given verbal and visual cues. Patient will naming objects in 60% of opp given verbal, visual and phonemic cues.

## 2023-08-17 NOTE — ASSESSMENT & PLAN NOTE
· Dx March 2023  · S/p lumpectomy 5/30/2023  · Follows with Dr. Frieda Encinas outpatient  · Plan for radiation to start soon

## 2023-08-17 NOTE — ASSESSMENT & PLAN NOTE
· Continue home Eliquis for [facility] [place] by [facility]  · Hold metoprolol secondary to hypotension  · Optimize electrolytes  · Monitor rhythm on tele

## 2023-08-17 NOTE — QUICK NOTE
Updated family at the bedside. We discussed stroke alert, AMS, stopping cefepime. They are aware we are waiting for cultures to finalize and MRI brain.

## 2023-08-18 ENCOUNTER — APPOINTMENT (OUTPATIENT)
Dept: NON INVASIVE DIAGNOSTICS | Facility: HOSPITAL | Age: 73
DRG: 853 | End: 2023-08-18
Attending: RADIOLOGY
Payer: MEDICARE

## 2023-08-18 LAB
AMMONIA PLAS-SCNC: 27 UMOL/L (ref 18–72)
ANION GAP SERPL CALCULATED.3IONS-SCNC: 8 MMOL/L
ATRIAL RATE: 104 BPM
ATRIAL RATE: 113 BPM
ATRIAL RATE: 131 BPM
ATRIAL RATE: 133 BPM
ATRIAL RATE: 74 BPM
BACTERIA BLD CULT: NORMAL
BACTERIA BLD CULT: NORMAL
BASE EX.OXY STD BLDV CALC-SCNC: 91.8 % (ref 60–80)
BASE EXCESS BLDV CALC-SCNC: -2.7 MMOL/L
BASOPHILS # BLD AUTO: 0.09 THOUSANDS/ÂΜL (ref 0–0.1)
BASOPHILS NFR BLD AUTO: 1 % (ref 0–1)
BUN SERPL-MCNC: 58 MG/DL (ref 5–25)
CALCIUM SERPL-MCNC: 9.3 MG/DL (ref 8.4–10.2)
CHLORIDE SERPL-SCNC: 109 MMOL/L (ref 96–108)
CHOLEST SERPL-MCNC: 80 MG/DL
CO2 SERPL-SCNC: 26 MMOL/L (ref 21–32)
CORTIS SERPL-MCNC: 22.7 UG/DL
CREAT SERPL-MCNC: 1.88 MG/DL (ref 0.6–1.3)
EOSINOPHIL # BLD AUTO: 0.23 THOUSAND/ÂΜL (ref 0–0.61)
EOSINOPHIL NFR BLD AUTO: 2 % (ref 0–6)
ERYTHROCYTE [DISTWIDTH] IN BLOOD BY AUTOMATED COUNT: 15.9 % (ref 11.6–15.1)
ERYTHROCYTE [DISTWIDTH] IN BLOOD BY AUTOMATED COUNT: 16.1 % (ref 11.6–15.1)
EST. AVERAGE GLUCOSE BLD GHB EST-MCNC: 217 MG/DL
GFR SERPL CREATININE-BSD FRML MDRD: 26 ML/MIN/1.73SQ M
GLUCOSE SERPL-MCNC: 173 MG/DL (ref 65–140)
GLUCOSE SERPL-MCNC: 185 MG/DL (ref 65–140)
GLUCOSE SERPL-MCNC: 198 MG/DL (ref 65–140)
GLUCOSE SERPL-MCNC: 201 MG/DL (ref 65–140)
GLUCOSE SERPL-MCNC: 97 MG/DL (ref 65–140)
GLUCOSE SERPL-MCNC: 97 MG/DL (ref 65–140)
HBA1C MFR BLD: 9.2 %
HCO3 BLDV-SCNC: 23.3 MMOL/L (ref 24–30)
HCT VFR BLD AUTO: 33.5 % (ref 34.8–46.1)
HCT VFR BLD AUTO: 34.3 % (ref 34.8–46.1)
HDLC SERPL-MCNC: 22 MG/DL
HGB BLD-MCNC: 10.3 G/DL (ref 11.5–15.4)
HGB BLD-MCNC: 10.5 G/DL (ref 11.5–15.4)
IMM GRANULOCYTES # BLD AUTO: 0.19 THOUSAND/UL (ref 0–0.2)
IMM GRANULOCYTES NFR BLD AUTO: 1 % (ref 0–2)
LDLC SERPL CALC-MCNC: 37 MG/DL (ref 0–100)
LYMPHOCYTES # BLD AUTO: 1.28 THOUSANDS/ÂΜL (ref 0.6–4.47)
LYMPHOCYTES NFR BLD AUTO: 9 % (ref 14–44)
MAGNESIUM SERPL-MCNC: 2.3 MG/DL (ref 1.9–2.7)
MCH RBC QN AUTO: 28.2 PG (ref 26.8–34.3)
MCH RBC QN AUTO: 28.4 PG (ref 26.8–34.3)
MCHC RBC AUTO-ENTMCNC: 30.6 G/DL (ref 31.4–37.4)
MCHC RBC AUTO-ENTMCNC: 30.7 G/DL (ref 31.4–37.4)
MCV RBC AUTO: 92 FL (ref 82–98)
MCV RBC AUTO: 92 FL (ref 82–98)
MONOCYTES # BLD AUTO: 1.64 THOUSAND/ÂΜL (ref 0.17–1.22)
MONOCYTES NFR BLD AUTO: 11 % (ref 4–12)
MRSA NOSE QL CULT: ABNORMAL
NASAL CANNULA: 2
NEUTROPHILS # BLD AUTO: 11.53 THOUSANDS/ÂΜL (ref 1.85–7.62)
NEUTS SEG NFR BLD AUTO: 76 % (ref 43–75)
NRBC BLD AUTO-RTO: 0 /100 WBCS
O2 CT BLDV-SCNC: 15 ML/DL
P AXIS: 51 DEGREES
PCO2 BLDV: 45.6 MM HG (ref 42–50)
PH BLDV: 7.33 [PH] (ref 7.3–7.4)
PLATELET # BLD AUTO: 250 THOUSANDS/UL (ref 149–390)
PLATELET # BLD AUTO: 260 THOUSANDS/UL (ref 149–390)
PMV BLD AUTO: 11.1 FL (ref 8.9–12.7)
PMV BLD AUTO: 11.1 FL (ref 8.9–12.7)
PO2 BLDV: 74.3 MM HG (ref 35–45)
POTASSIUM SERPL-SCNC: 4.8 MMOL/L (ref 3.5–5.3)
PR INTERVAL: 188 MS
PROCALCITONIN SERPL-MCNC: 0.4 NG/ML
QRS AXIS: -19 DEGREES
QRS AXIS: -21 DEGREES
QRS AXIS: -22 DEGREES
QRS AXIS: -9 DEGREES
QRS AXIS: -9 DEGREES
QRSD INTERVAL: 64 MS
QRSD INTERVAL: 72 MS
QRSD INTERVAL: 72 MS
QRSD INTERVAL: 76 MS
QRSD INTERVAL: 76 MS
QT INTERVAL: 292 MS
QT INTERVAL: 308 MS
QT INTERVAL: 400 MS
QTC INTERVAL: 429 MS
QTC INTERVAL: 435 MS
QTC INTERVAL: 444 MS
QTC INTERVAL: 454 MS
QTC INTERVAL: 456 MS
RBC # BLD AUTO: 3.63 MILLION/UL (ref 3.81–5.12)
RBC # BLD AUTO: 3.73 MILLION/UL (ref 3.81–5.12)
SODIUM SERPL-SCNC: 143 MMOL/L (ref 135–147)
T WAVE AXIS: 25 DEGREES
T WAVE AXIS: 27 DEGREES
T WAVE AXIS: 28 DEGREES
T WAVE AXIS: 33 DEGREES
T WAVE AXIS: 6 DEGREES
TRIGL SERPL-MCNC: 107 MG/DL
VANCOMYCIN SERPL-MCNC: 14.9 UG/ML (ref 10–20)
VENTRICULAR RATE: 120 BPM
VENTRICULAR RATE: 130 BPM
VENTRICULAR RATE: 131 BPM
VENTRICULAR RATE: 132 BPM
VENTRICULAR RATE: 74 BPM
WBC # BLD AUTO: 14.95 THOUSAND/UL (ref 4.31–10.16)
WBC # BLD AUTO: 14.96 THOUSAND/UL (ref 4.31–10.16)

## 2023-08-18 PROCEDURE — 99233 SBSQ HOSP IP/OBS HIGH 50: CPT | Performed by: PHYSICIAN ASSISTANT

## 2023-08-18 PROCEDURE — 87070 CULTURE OTHR SPECIMN AEROBIC: CPT | Performed by: PHYSICIAN ASSISTANT

## 2023-08-18 PROCEDURE — 97110 THERAPEUTIC EXERCISES: CPT

## 2023-08-18 PROCEDURE — 10030 IMG GID FLU COLL DRG SFT TIS: CPT

## 2023-08-18 PROCEDURE — 82948 REAGENT STRIP/BLOOD GLUCOSE: CPT

## 2023-08-18 PROCEDURE — 82805 BLOOD GASES W/O2 SATURATION: CPT | Performed by: NURSE PRACTITIONER

## 2023-08-18 PROCEDURE — NC001 PR NO CHARGE: Performed by: RADIOLOGY

## 2023-08-18 PROCEDURE — 99152 MOD SED SAME PHYS/QHP 5/>YRS: CPT

## 2023-08-18 PROCEDURE — 49423 EXCHANGE DRAINAGE CATHETER: CPT

## 2023-08-18 PROCEDURE — 49423 EXCHANGE DRAINAGE CATHETER: CPT | Performed by: RADIOLOGY

## 2023-08-18 PROCEDURE — 82140 ASSAY OF AMMONIA: CPT | Performed by: NURSE PRACTITIONER

## 2023-08-18 PROCEDURE — 80202 ASSAY OF VANCOMYCIN: CPT | Performed by: PHYSICIAN ASSISTANT

## 2023-08-18 PROCEDURE — 99232 SBSQ HOSP IP/OBS MODERATE 35: CPT | Performed by: NURSE PRACTITIONER

## 2023-08-18 PROCEDURE — 83735 ASSAY OF MAGNESIUM: CPT | Performed by: NURSE PRACTITIONER

## 2023-08-18 PROCEDURE — C1769 GUIDE WIRE: HCPCS

## 2023-08-18 PROCEDURE — 83036 HEMOGLOBIN GLYCOSYLATED A1C: CPT | Performed by: NURSE PRACTITIONER

## 2023-08-18 PROCEDURE — 0H9T30Z DRAINAGE OF RIGHT BREAST WITH DRAINAGE DEVICE, PERCUTANEOUS APPROACH: ICD-10-PCS | Performed by: RADIOLOGY

## 2023-08-18 PROCEDURE — C1729 CATH, DRAINAGE: HCPCS

## 2023-08-18 PROCEDURE — 85025 COMPLETE CBC W/AUTO DIFF WBC: CPT | Performed by: NURSE PRACTITIONER

## 2023-08-18 PROCEDURE — 85027 COMPLETE CBC AUTOMATED: CPT | Performed by: NURSE PRACTITIONER

## 2023-08-18 PROCEDURE — 87205 SMEAR GRAM STAIN: CPT | Performed by: PHYSICIAN ASSISTANT

## 2023-08-18 PROCEDURE — 75984 XRAY CONTROL CATHETER CHANGE: CPT

## 2023-08-18 PROCEDURE — 84145 PROCALCITONIN (PCT): CPT | Performed by: NURSE PRACTITIONER

## 2023-08-18 PROCEDURE — 10030 IMG GID FLU COLL DRG SFT TIS: CPT | Performed by: RADIOLOGY

## 2023-08-18 PROCEDURE — 80061 LIPID PANEL: CPT | Performed by: NURSE PRACTITIONER

## 2023-08-18 PROCEDURE — 82533 TOTAL CORTISOL: CPT | Performed by: NURSE PRACTITIONER

## 2023-08-18 PROCEDURE — 99232 SBSQ HOSP IP/OBS MODERATE 35: CPT | Performed by: INTERNAL MEDICINE

## 2023-08-18 PROCEDURE — 99152 MOD SED SAME PHYS/QHP 5/>YRS: CPT | Performed by: RADIOLOGY

## 2023-08-18 PROCEDURE — 0W383ZZ CONTROL BLEEDING IN CHEST WALL, PERCUTANEOUS APPROACH: ICD-10-PCS | Performed by: RADIOLOGY

## 2023-08-18 PROCEDURE — 87476 LYME DIS DNA AMP PROBE: CPT | Performed by: NURSE PRACTITIONER

## 2023-08-18 PROCEDURE — 75984 XRAY CONTROL CATHETER CHANGE: CPT | Performed by: RADIOLOGY

## 2023-08-18 PROCEDURE — 93010 ELECTROCARDIOGRAM REPORT: CPT | Performed by: INTERNAL MEDICINE

## 2023-08-18 PROCEDURE — 87075 CULTR BACTERIA EXCEPT BLOOD: CPT | Performed by: PHYSICIAN ASSISTANT

## 2023-08-18 PROCEDURE — 80048 BASIC METABOLIC PNL TOTAL CA: CPT | Performed by: NURSE PRACTITIONER

## 2023-08-18 RX ORDER — FENTANYL CITRATE 50 UG/ML
INJECTION, SOLUTION INTRAMUSCULAR; INTRAVENOUS AS NEEDED
Status: COMPLETED | OUTPATIENT
Start: 2023-08-18 | End: 2023-08-18

## 2023-08-18 RX ORDER — INSULIN LISPRO 100 [IU]/ML
1-5 INJECTION, SOLUTION INTRAVENOUS; SUBCUTANEOUS
Status: DISCONTINUED | OUTPATIENT
Start: 2023-08-19 | End: 2023-08-22 | Stop reason: HOSPADM

## 2023-08-18 RX ORDER — METOPROLOL TARTRATE 5 MG/5ML
5 INJECTION INTRAVENOUS EVERY 6 HOURS PRN
Status: DISCONTINUED | OUTPATIENT
Start: 2023-08-18 | End: 2023-08-22 | Stop reason: HOSPADM

## 2023-08-18 RX ORDER — SODIUM CHLORIDE 9 MG/ML
10 INJECTION INTRAVENOUS DAILY
Qty: 300 ML | Refills: 2 | Status: SHIPPED | OUTPATIENT
Start: 2023-08-18 | End: 2023-08-30 | Stop reason: CLARIF

## 2023-08-18 RX ORDER — INSULIN GLARGINE 100 [IU]/ML
12 INJECTION, SOLUTION SUBCUTANEOUS ONCE
Status: COMPLETED | OUTPATIENT
Start: 2023-08-18 | End: 2023-08-18

## 2023-08-18 RX ORDER — MIDAZOLAM HYDROCHLORIDE 2 MG/2ML
INJECTION, SOLUTION INTRAMUSCULAR; INTRAVENOUS
Status: DISPENSED
Start: 2023-08-18 | End: 2023-08-19

## 2023-08-18 RX ORDER — INSULIN GLARGINE 100 [IU]/ML
25 INJECTION, SOLUTION SUBCUTANEOUS
Status: DISCONTINUED | OUTPATIENT
Start: 2023-08-19 | End: 2023-08-22 | Stop reason: HOSPADM

## 2023-08-18 RX ORDER — ACETAMINOPHEN 325 MG/1
650 TABLET ORAL EVERY 6 HOURS PRN
Status: DISCONTINUED | OUTPATIENT
Start: 2023-08-18 | End: 2023-08-22 | Stop reason: HOSPADM

## 2023-08-18 RX ORDER — INSULIN LISPRO 100 [IU]/ML
5 INJECTION, SOLUTION INTRAVENOUS; SUBCUTANEOUS
Status: DISCONTINUED | OUTPATIENT
Start: 2023-08-19 | End: 2023-08-22 | Stop reason: HOSPADM

## 2023-08-18 RX ORDER — MIDAZOLAM HYDROCHLORIDE 2 MG/2ML
INJECTION, SOLUTION INTRAMUSCULAR; INTRAVENOUS AS NEEDED
Status: COMPLETED | OUTPATIENT
Start: 2023-08-18 | End: 2023-08-18

## 2023-08-18 RX ORDER — FENTANYL CITRATE 50 UG/ML
INJECTION, SOLUTION INTRAMUSCULAR; INTRAVENOUS
Status: DISPENSED
Start: 2023-08-18 | End: 2023-08-19

## 2023-08-18 RX ORDER — INSULIN LISPRO 100 [IU]/ML
1-5 INJECTION, SOLUTION INTRAVENOUS; SUBCUTANEOUS
Status: DISCONTINUED | OUTPATIENT
Start: 2023-08-18 | End: 2023-08-22 | Stop reason: HOSPADM

## 2023-08-18 RX ADMIN — INSULIN LISPRO 2 UNITS: 100 INJECTION, SOLUTION INTRAVENOUS; SUBCUTANEOUS at 11:51

## 2023-08-18 RX ADMIN — INSULIN LISPRO 10 UNITS: 100 INJECTION, SOLUTION INTRAVENOUS; SUBCUTANEOUS at 11:51

## 2023-08-18 RX ADMIN — INSULIN GLARGINE 12 UNITS: 100 INJECTION, SOLUTION SUBCUTANEOUS at 21:52

## 2023-08-18 RX ADMIN — PANTOPRAZOLE SODIUM 40 MG: 40 TABLET, DELAYED RELEASE ORAL at 08:05

## 2023-08-18 RX ADMIN — FLUTICASONE PROPIONATE 1 SPRAY: 50 SPRAY, METERED NASAL at 08:06

## 2023-08-18 RX ADMIN — INSULIN LISPRO 10 UNITS: 100 INJECTION, SOLUTION INTRAVENOUS; SUBCUTANEOUS at 08:03

## 2023-08-18 RX ADMIN — APIXABAN 5 MG: 5 TABLET, FILM COATED ORAL at 08:05

## 2023-08-18 RX ADMIN — METOPROLOL TARTRATE 25 MG: 25 TABLET, FILM COATED ORAL at 08:05

## 2023-08-18 RX ADMIN — MONTELUKAST 10 MG: 10 TABLET, FILM COATED ORAL at 21:52

## 2023-08-18 RX ADMIN — Medication 250 MG: at 08:05

## 2023-08-18 RX ADMIN — ACETAMINOPHEN 650 MG: 325 TABLET ORAL at 08:05

## 2023-08-18 RX ADMIN — INSULIN LISPRO 4 UNITS: 100 INJECTION, SOLUTION INTRAVENOUS; SUBCUTANEOUS at 08:03

## 2023-08-18 RX ADMIN — NYSTATIN 1 APPLICATION: 100000 POWDER TOPICAL at 08:06

## 2023-08-18 RX ADMIN — METOPROLOL TARTRATE 5 MG: 5 INJECTION INTRAVENOUS at 02:17

## 2023-08-18 RX ADMIN — INSULIN LISPRO 2 UNITS: 100 INJECTION, SOLUTION INTRAVENOUS; SUBCUTANEOUS at 02:15

## 2023-08-18 RX ADMIN — METOPROLOL TARTRATE 25 MG: 25 TABLET, FILM COATED ORAL at 20:28

## 2023-08-18 RX ADMIN — NYSTATIN 1 APPLICATION: 100000 POWDER TOPICAL at 17:57

## 2023-08-18 RX ADMIN — FENTANYL CITRATE 50 MCG: 50 INJECTION, SOLUTION INTRAMUSCULAR; INTRAVENOUS at 16:03

## 2023-08-18 RX ADMIN — VANCOMYCIN HYDROCHLORIDE 1250 MG: 10 INJECTION, POWDER, LYOPHILIZED, FOR SOLUTION INTRAVENOUS at 09:58

## 2023-08-18 RX ADMIN — ASPIRIN 81 MG CHEWABLE TABLET 81 MG: 81 TABLET CHEWABLE at 08:05

## 2023-08-18 RX ADMIN — MIDAZOLAM 1 MG: 1 INJECTION INTRAMUSCULAR; INTRAVENOUS at 16:02

## 2023-08-18 RX ADMIN — FLUTICASONE FUROATE AND VILANTEROL TRIFENATATE 1 PUFF: 100; 25 POWDER RESPIRATORY (INHALATION) at 08:06

## 2023-08-18 NOTE — PROGRESS NOTES
[address]  Progress Note  Name: Fransisco Currie  MRN: [id number]  Unit/Bed#: ICU 01 I Date of Admission: 8/11/2023   Date of Service: 8/18/2023 I Hospital Day: 7    Assessment/Plan   * Septic shock ([address])  Assessment & Plan  · As evidenced by fever 101.6, tachycardia, tachypnea, and leukocytosis  · Unclear source at this time  · Initially presumed to be pneumonia  · Area of cellulitic skin changes on right lateral breast and palpable painful collection-thoughts to be hematoma vs seroma on CT scan. · Discuss drainage with IR today  · CXR 8/11 with concern for RLL infiltrate  · CT chest 8/14 bibasilar atelectasis   · Repeat CXR today with bibasilar atelectasis vs infiltrates  · CT C/A/P to r/o occult source of infection- appears unchanged from prior. Formal report pending  · Hypotension with SBP 80  · 30mL IVF of IBW ordered  · MAP goal > 65  · BP improved with IVF  · Procal elevated at 0.36-->0.62-->0.56, continue to trend   · Persistent leukocytosis (WBC 13-->14), continue to trend daily  · ATC Tylenol for fever control   · MRSA nasal surveillance pending  · Cefepime D/C yesterday d/t concern it was contributing to encephalopathy, continue vanco   · Fevers stopped after vanco initiated   · UA unremarkable   · Repeat BC x 2 neagtive at 24 hours   · Blood cultures from admission 8/11 negative x 4 days   · Strep pneumo and legionella urine antigens negative  · Obtain sputum culture if able   · MRSA nasal surveillance positive   · ID consulted, appreciate recommendations     Toxic metabolic encephalopathy  Assessment & Plan  · Suspected to be secondary to fever, sepsis, and SEBASTIAN/uremia  · Mental status had improved after fever control  · Acute change in mental status yesterday morning with sudden expressive and receptive aphasia and stroke alert was called.   · Not tPA candidate secondary to Eliquis  · CT head negative  · 8/17: MRI brain-No acute infarction, edema, or pathologic intra-axial enhancement. Mild chronic microangiopathic ischemic changes. · 8/17: MRA carotids-There is no evidence for a hemodynamically significant extracranial carotid stenosis. The cervical vertebral arteries are patent. · 8/17: MRA head-no LVO  · Sepsis treatment as above  · Stroke pathway, neuro checks per protocol  · Aspirin started, continue home statin   · Continues to have expressive aphasia, slightly improved with ability to answer some questions but most questions answers by stating "okay".  Follows commands   · Delirium precautions; regulate sleep/wake cycle, environmental controls, daily CAM ICU   · TSH WNL  · Ammonia pending this morning   · Cefepime d/c yesterday d/t concerns that it was contributing to encephalopathy     Acute kidney injury superimposed on chronic kidney disease [facility])  Assessment & Plan  Lab Results   Component Value Date    EGFR 19 08/17/2023    EGFR 17 08/16/2023    EGFR 17 08/16/2023    CREATININE 2.39 (H) 08/17/2023    CREATININE 2.71 (H) 08/16/2023    CREATININE 2.70 (H) 08/16/2023     · Baseline creatinine appears to be between 1.3-1.7  · Admission creatinine 1.64  · Peak creatinine 2.91, now currently 2.39  · D/C IV Lasix (received 40mg x 2 8/15 and once 8/16 AM)   · Hold home demadex   · S/p 30ml/kg IVF 8/16 with improvement in creatinine   · Trend BUN/creatinine  · Avoid nephrotoxic agents  · Strict I&O  · U/S kidneys/bladder 8/15-Unremarkable kidneys without evidence of hydronephrosis    Chronic respiratory failure with hypoxia and hypercapnia (HCC)  Assessment & Plan  · Moderate persistent asthma/KATRINA   · CPAP 7 @ HS with 1-2L supplemental O2  · No O2 requirement during the day  · Follows with pulm outpatient   · Currently on 2LNC   · Goal O2 sat 90% or above   · Pulmonary toilet; encourage coughing and deep breathing, IS q1h while awake     Type 2 diabetes mellitus with hyperglycemia, without long-term current use of insulin [facility])  Assessment & Plan  Lab Results   Component Value Date    HGBA1C 9.3 (H) 08/13/2023       Recent Labs     08/17/23  1108 08/17/23  1548 08/17/23  2036 08/18/23  0138   POCGLU 184* 221* 205* 198*       Blood Sugar Average: Last 72 hrs:  (P) 565.9393862830611071     · Continue home regimen 10 units humalog with meals and 25 units lantus @ HS + SSI Alg #4 coverage ACHS  · Goal blood glucose less than 180     Moderate persistent asthma without complication  Assessment & Plan  · Continue home fluticasone furoate-vilanterol inhaler daily  · Continue daily singulair  · Albuterol inhaler PRN     Cellulitis  Assessment & Plan  · Right lateral breast with edematous, erythematous skin. Appears consistent with cellulitis  · CT 8/16-loculated fluid collection measuring at least 11 cm  · Discuss drainage with IR  · Continue antibiotics as outlined above  · Local skin care    Paroxysmal atrial fibrillation (HCC)  Assessment & Plan  · Continue home Eliquis and metoprolol  · PRN Metoprolol for sustained HR greater than 120  · Optimize electrolytes  · Monitor rhythm on tele     KATRINA (obstructive sleep apnea)  Assessment & Plan  · Continue CPAP 7 @ HS     Ductal carcinoma in situ (DCIS) of right breast  Assessment & Plan  · Dx March 2023  · S/p lumpectomy 5/30/2023  · Follows with Dr. Charmaine Mcclellan outpatient  · Plan for radiation to start soon     Mixed hyperlipidemia  Assessment & Plan  · Continue home statin     Morbid obesity with BMI of 45.0-49.9, adult ([address])  Assessment & Plan  · Encourage diet and lifestyle modification  · Nutrition consult     Essential hypertension  Assessment & Plan  · Continue metoprolol             ICU Core Measures     A: Assess, Prevent, and Manage Pain · Has pain been assessed? Yes  · Need for changes to pain regimen? No   B: Both SAT/SAT  · N/A   C: Choice of Sedation · RASS Goal: N/A patient not on sedation  · Need for changes to sedation or analgesia regimen?  NA   D: Delirium · CAM-ICU: Unable to perform secondary to Acute cognitive dysfunction   E: Early Mobility  · Plan for early mobility? Yes   F: Family Engagement · Plan for family engagement today? Yes       Antibiotic Review: Continue broad spectrum secondary to severity of illness. and Infectious disease consulted      Prophylaxis:  VTE VTE covered by:  apixaban, Oral       Stress Ulcer  covered bypantoprazole (PROTONIX) EC tablet 40 mg [[id number]]       Subjective   HPI/24hr events: Acute change in mental status yesterday morning with sudden expressive and receptive aphasia and stroke alert was called. Not tPA candidate secondary to Eliquis. CT head and MRI brain negative. Continues to have expressive aphasia, slightly improved with ability to answer some questions but most questions answers by stating "okay". Follows commands. Fevers have stopped since starting vanco. Cefepime D/C yesterday d/t concern for it contributing to encephalopathy. Review of Systems   Unable to perform ROS: Mental status change               Objective                            Vitals I/O      Most Recent Min/Max in 24hrs   Temp 97.8 °F (36.6 °C) Temp  Min: 96.5 °F (35.8 °C)  Max: 98.4 °F (36.9 °C)   Pulse (!) 121 Pulse  Min: 76  Max: 143   Resp (!) 23 Resp  Min: 12  Max: 37   /61 BP  Min: 115/59  Max: 167/55   O2 Sat 95 % SpO2  Min: 92 %  Max: 97 %      Intake/Output Summary (Last 24 hours) at 8/18/2023 0429  Last data filed at 8/18/2023 0222  Gross per 24 hour   Intake --   Output 1450 ml   Net -1450 ml         Diet [place]/CHO Controlled; Consistent Carbohydrate Diet Level 2 (5 carb servings/75 grams CHO/meal); Potassium 2 GM, Dysphagia 3-Dental Soft; Thin Liquid     Invasive Monitoring Physical exam   N/A Physical Exam  Eyes:      General: Lids are normal.      Extraocular Movements: Extraocular movements intact. Conjunctiva/sclera: Conjunctivae normal.      Pupils: Pupils are equal, round, and reactive to light. Skin:     General: Skin is warm and dry.       Capillary Refill: Capillary refill takes less than 2 seconds. Coloration: Skin is pale. HENT:      Head: Normocephalic and atraumatic. Neck:     Trachea: Trachea normal.    Cardiovascular:      Rate and Rhythm: Tachycardia present. Rhythm irregularly irregular. Pulses: Normal pulses. Radial pulses are 2+ on the right side and 2+ on the left side. Dorsalis pedis pulses are 2+ on the right side and 2+ on the left side. Heart sounds: Normal heart sounds, S1 normal and S2 normal.   Musculoskeletal:      Cervical back: Neck supple. Right lower le+ Edema present. Left lower le+ Edema present. Abdominal:      General: Bowel sounds are normal.      Palpations: Abdomen is soft. Constitutional:       General: She is awake. Appearance: She is obese. She is ill-appearing. Pulmonary:      Effort: Pulmonary effort is normal.      Breath sounds: Decreased breath sounds present. Chest:   Breasts:     Right: Skin change and tenderness present. Psychiatric:         Attention and Perception: Attention and perception normal.         Mood and Affect: Affect is blunt. Speech: Speech is delayed. Behavior: Behavior is cooperative. Neurological:      Mental Status: She is alert. GCS: GCS eye subscore is 4. GCS verbal subscore is 3. GCS motor subscore is 6. Comments: Expressive aphasia             Diagnostic Studies      Imagin/17: MRI brain-No acute infarction, edema, or pathologic intra-axial enhancement. Mild chronic microangiopathic ischemic changes. : MRA carotids-There is no evidence for a hemodynamically significant extracranial carotid stenosis. The cervical vertebral arteries are patent. : MRA head-no LVO  : CT head-negative I have personally reviewed pertinent reports.    and I have personally reviewed pertinent films in PACS     Medications:  Scheduled PRN   apixaban, 5 mg, BID  aspirin, 81 mg, Daily  atorvastatin, 40 mg, Daily With Dinner  fluticasone, 1 spray, Daily  Fluticasone Furoate-Vilanterol, 1 puff, Daily  insulin glargine, 25 Units, HS  insulin lispro, 10 Units, TID With Meals  insulin lispro, 2-12 Units, TID AC  insulin lispro, 2-12 Units, HS  insulin lispro, 2-12 Units, 0200  metoprolol tartrate, 25 mg, Q12H SELMA  montelukast, 10 mg, HS  nystatin, , BID  pantoprazole, 40 mg, Daily  saccharomyces boulardii, 250 mg, BID      acetaminophen, 650 mg, Q6H PRN  ammonium lactate, 1 Application, BID PRN  metoprolol, 5 mg, Q6H PRN  ondansetron, 4 mg, Q6H PRN  vancomycin, 15 mg/kg (Adjusted), Daily PRN       Continuous          Labs:    CBC    Recent Labs     08/16/23  1322 08/17/23  0547   WBC 13.31* 14.72*   HGB 10.0* 10.1*   HCT 31.4* 32.1*    184     BMP    Recent Labs     08/16/23  1322 08/17/23  0809   SODIUM 139 139   K 4.7 4.9    106   CO2 28 26   AGAP 8 7   BUN 67* 65*   CREATININE 2.71* 2.39*   CALCIUM 8.8 8.6       Coags    No recent results     Additional Electrolytes  Recent Labs     08/16/23  0847 08/17/23  0809   MG  --  2.0   PHOS  --  4.4*   CAIONIZED 1.08*  --           Blood Gas    Recent Labs     08/16/23  1215   PHART 7.392   NQF9HBS 43.7   PO2ART 74.6*   GJY1ATM 26.0   BEART 0.8   SOURCE Radial, Right     Recent Labs     08/16/23  1215   SOURCE Radial, Right    LFTs  Recent Labs     08/16/23  1322   ALT 8   AST 9*   ALKPHOS 43   ALB 3.2*   TBILI 0.41       Infectious  Recent Labs     08/16/23  1322 08/17/23  0547   PROCALCITONI 0.62* 0.56*     Glucose  Recent Labs     08/16/23  0455 08/16/23  1322 08/17/23  0809   GLUC 120 177* 140            RK Ortez

## 2023-08-18 NOTE — ASSESSMENT & PLAN NOTE
· Right lateral breast with edematous, erythematous skin.  Appears consistent with cellulitis  · CT 8/16-loculated fluid collection measuring at least 11 cm  · Discuss drainage with IR  · Continue antibiotics as outlined above  · Local skin care

## 2023-08-18 NOTE — PHYSICAL THERAPY NOTE
PT re-assessment   08/18/23 1205   PT Last Visit   PT Visit Date 08/18/23   Note Type   Note Type Treatment   Pain Assessment   Pain Assessment Tool FLACC   Pain Rating: FLACC (Rest) - Face 0   Pain Rating: FLACC (Rest) - Legs 0   Pain Rating: FLACC (Rest) - Activity 0   Pain Rating: FLACC (Rest) - Cry 0   Pain Rating: FLACC (Rest) - Consolability 0   Score: FLACC (Rest) 0   Pain Rating: FLACC (Activity) - Face 0   Pain Rating: FLACC (Activity) - Legs 0   Pain Rating: FLACC (Activity) - Activity 0   Pain Rating: FLACC (Activity) - Cry 0   Pain Rating: FLACC (Activity) - Consolability 0   Score: FLACC (Activity) 0   Restrictions/Precautions   Weight Bearing Precautions Per Order No   Other Precautions Contact/isolation;Cognitive; Chair Alarm; Bed Alarm;Multiple lines;Telemetry;O2;Fall Risk   General   Chart Reviewed Yes   Family/Caregiver Present No   Cognition   Overall Cognitive Status Impaired   Arousal/Participation Alert   Attention Difficulty attending to directions   Orientation Level Oriented to person;Disoriented to place; Disoriented to time;Disoriented to situation   Following Commands Follows one step commands inconsistently   Subjective   Subjective "Okay",  "help me"  ,no other meaningful verbage. Bed Mobility   Additional Comments deferred by RN , unable to follow commands   Transfers   Sit to Stand Unable to assess   Ambulation/Elevation   Gait Assistance Not tested   Activity Tolerance   Activity Tolerance Treatment limited secondary to medical complications (Comment)   Nurse Made Aware RN: Varsha Plank   Exercises   Heelslides Supine;5 reps;PROM; Bilateral   Hip Flexion Supine;10 reps;PROM; Bilateral  (SLR)   Hip Abduction Supine;10 reps;PROM; Bilateral   Hip Extension Supine;10 reps;PROM; Bilateral  (hip Internal rot and External rotation)   Knee AROM Short Arc Quad Supine;10 reps;PROM; Bilateral   Ankle Pumps Supine;10 reps;PROM; Bilateral   UE Exercise Supine;10 reps;PROM; Bilateral  (shoulder flexion (limited on left), shoulder horizontal ab/adduction, bicep curls.)   Assessment   Problem List Decreased strength;Decreased endurance; Impaired balance;Decreased mobility; Decreased coordination;Decreased cognition; Impaired judgement;Decreased safety awareness; Obesity; Decreased skin integrity;Pain   Goals   Patient Goals Unable to state due to aphasia   STG Expiration Date 08/25/23   Short Term Goal #1 Mod A of 2 for rolling right/left with use of bedrails; Mod A of 2 for transfer supine to short sit ,  Mod A of 2 for stand pivot bed <> chair   Short Term Goal #2 Pt able to follow 50% of commands   LTG Expiration Date 09/01/23   Long Term Goal #1 Min A for bedmobility and transfers to short sit. Min A or less for transfers bed <> chair with use of RW;  Pt able to follow 100% of commands   Long Term Goal #2 Min A for amb. with RW for 40 feet with fair + balance or better; Indep with HEP. Plan   Treatment/Interventions ADL retraining;Functional transfer training;LE strengthening/ROM; Therapeutic exercise; Endurance training;Cognitive reorientation;Patient/family training;Equipment eval/education; Bed mobility;Gait training;Continued evaluation;Spoke to nursing   Progress Slow progress, medical status limitations   PT Frequency Other (Comment)  (5/wk)   Recommendation   PT Discharge Recommendation Post acute rehabilitation services   Additional Comments Pt was admitted to hospital due to sepsis and pneumonia. Was  originally seen for evaluation on 8/13/23, however pt recently had a change in medical status and change in mental status which prompted the transfer to ICU.   r/o for CVA =  MRI negative per chart. Pt seen today for a re-assessment  due to change in functional and mental faculties. Goals for PT revised. Will continue PT to progress as pt tolerates and as medically appropriate.    AM-PAC Basic Mobility Inpatient   Turning in Flat Bed Without Bedrails 1   Lying on Back to Sitting on Edge of Flat Bed Without Bedrails 1   Moving Bed to Chair 1   Standing Up From Chair Using Arms 1   Walk in Room 1   Climb 3-5 Stairs With Railing 1   Basic Mobility Inpatient Raw Score 6   Turning Head Towards Sound 1   Follow Simple Instructions 1   Low Function Basic Mobility Raw Score  8   Low Function Basic Mobility Standardized Score  10.37   Highest Level Of Mobility   -HLM Goal 2: Bed activities/Dependent transfer   JH-HLM Achieved 2: Bed activities/Dependent transfer   End of Consult   Patient Position at End of Consult Supine;Bed/Chair alarm activated   Licensure   [place] License Number  Jorden. Melany [facility]  [id number]

## 2023-08-18 NOTE — ASSESSMENT & PLAN NOTE
· As evidenced by fever 101.6, tachycardia, tachypnea, and leukocytosis  · Unclear source at this time  · Initially presumed to be pneumonia  · Area of cellulitic skin changes on right lateral breast and palpable painful collection-thoughts to be hematoma vs seroma on CT scan. · Discuss drainage with IR today  · CXR 8/11 with concern for RLL infiltrate  · CT chest 8/14 bibasilar atelectasis   · Repeat CXR today with bibasilar atelectasis vs infiltrates  · CT C/A/P to r/o occult source of infection- appears unchanged from prior.  Formal report pending  · Hypotension with SBP 80  · 30mL IVF of IBW ordered  · MAP goal > 65  · BP improved with IVF  · Procal elevated at 0.36-->0.62-->0.56, continue to trend   · Persistent leukocytosis (WBC 13-->14), continue to trend daily  · ATC Tylenol for fever control   · MRSA nasal surveillance pending  · Cefepime D/C yesterday d/t concern it was contributing to encephalopathy, continue vanco   · Fevers stopped after vanco initiated   · UA unremarkable   · Repeat BC x 2 neagtive at 24 hours   · Blood cultures from admission 8/11 negative x 4 days   · Strep pneumo and legionella urine antigens negative  · Obtain sputum culture if able   · MRSA nasal surveillance positive   · ID consulted, appreciate recommendations

## 2023-08-18 NOTE — QUICK NOTE
Fentanyl 50 mcg and Versed 1mg IV administered by Kailee Francisco during IR drainage tube placement fro pain mangement during procedure.

## 2023-08-18 NOTE — CONSULTS
e-Consult (IPC)  - Interventional Radiology  Mateus Smith 72 y.o. female MRN: [id number]  Unit/Bed#: ICU 01 Encounter: [id number]    Interventional Radiology has been consulted to evaluate Mateus Smith    We were consulted by Russell Noe concerning this patient with sepsis. Inpatient Consult to IR  Consult performed by: Maddie Mccarthy MD  Consult ordered by: Hansel Smith PA-C        08/18/23    Assessment/Recommendation:   Sepsis with large 11cm right breast fluid collection for ultrasound drainage tube placement. 21-30 minutes, >50% of the total time devoted to medical consultative verbal/EMR discussion between providers. Written report will be generated in the EMR. Thank you for allowing Interventional Radiology to participate in the care of Mateus Smith. Please don't hesitate to call or TigerText us with any questions.      Maddie Mccarthy MD

## 2023-08-18 NOTE — ASSESSMENT & PLAN NOTE
· Moderate persistent asthma/KATRINA   · CPAP 7 @ HS with 1-2L supplemental O2  · No O2 requirement during the day  · Follows with pulm outpatient   · Currently on 2LNC   · Goal O2 sat 90% or above   · Pulmonary toilet; encourage coughing and deep breathing, IS q1h while awake

## 2023-08-18 NOTE — PLAN OF CARE
Problem: RESPIRATORY - ADULT  Goal: Achieves optimal ventilation and oxygenation  Description: INTERVENTIONS:  - Assess for changes in respiratory status  - Assess for changes in mentation and behavior  - Position to facilitate oxygenation and minimize respiratory effort  - Oxygen administered by appropriate delivery if ordered  - Initiate smoking cessation education as indicated  - Encourage broncho-pulmonary hygiene including cough, deep breathe, Incentive Spirometry  - Assess the need for suctioning and aspirate as needed  - Assess and instruct to report SOB or any respiratory difficulty  - Respiratory Therapy support as indicated  Outcome: Progressing     Problem: INFECTION - ADULT  Goal: Absence or prevention of progression during hospitalization  Description: INTERVENTIONS:  - Assess and monitor for signs and symptoms of infection  - Monitor lab/diagnostic results  - Monitor all insertion sites, i.e. indwelling lines, tubes, and drains  - Monitor endotracheal if appropriate and nasal secretions for changes in amount and color  - [place] appropriate cooling/warming therapies per order  - Administer medications as ordered  - Instruct and encourage patient and family to use good hand hygiene technique  - Identify and instruct in appropriate isolation precautions for identified infection/condition  Outcome: Progressing  Goal: Absence of fever/infection during neutropenic period  Description: INTERVENTIONS:  - Monitor WBC    Outcome: Progressing     Problem: SAFETY ADULT  Goal: Patient will remain free of falls  Description: INTERVENTIONS:  - Educate patient/family on patient safety including physical limitations  - Instruct patient to call for assistance with activity   - Consult OT/PT to assist with strengthening/mobility   - Keep Call bell within reach  - Keep bed low and locked with side rails adjusted as appropriate  - Keep care items and personal belongings within reach  - Initiate and maintain comfort rounds  - Make Fall Risk Sign visible to staff  - Offer Toileting every 2 Hours, in advance of need  - Initiate/Maintain bed alarm  - Obtain necessary fall risk management equipment: yellow socks  - Apply yellow socks and bracelet for high fall risk patients  - Consider moving patient to room near nurses station  Outcome: Progressing  Goal: Maintain or return to baseline ADL function  Description: INTERVENTIONS:  -  Assess patient's ability to carry out ADLs; assess patient's baseline for ADL function and identify physical deficits which impact ability to perform ADLs (bathing, care of mouth/teeth, toileting, grooming, dressing, etc.)  - Assess/evaluate cause of self-care deficits   - Assess range of motion  - Assess patient's mobility; develop plan if impaired  - Assess patient's need for assistive devices and provide as appropriate  - Encourage maximum independence but intervene and supervise when necessary  - Involve family in performance of ADLs  - Assess for home care needs following discharge   - Consider OT consult to assist with ADL evaluation and planning for discharge  - Provide patient education as appropriate  Outcome: Progressing  Goal: Maintains/Returns to pre admission functional level  Description: INTERVENTIONS:  - Perform BMAT or MOVE assessment daily.   - Set and communicate daily mobility goal to care team and patient/family/caregiver. - Collaborate with rehabilitation services on mobility goals if consulted  - Perform Range of Motion 4 times a day. - Reposition patient every 2 hours.   - Dangle patient 3 times a day  - Stand patient 3 times a day  - Ambulate patient 3 times a day  - Out of bed to chair 3 times a day   - Out of bed for meals 3 times a day  - Out of bed for toileting  - Record patient progress and toleration of activity level   Outcome: Progressing     Problem: DISCHARGE PLANNING  Goal: Discharge to home or other facility with appropriate resources  Description: INTERVENTIONS:  - Identify barriers to discharge w/patient and caregiver  - Arrange for needed discharge resources and transportation as appropriate  - Identify discharge learning needs (meds, wound care, etc.)  - Arrange for interpretive services to assist at discharge as needed  - Refer to Case Management Department for coordinating discharge planning if the patient needs post-hospital services based on physician/advanced practitioner order or complex needs related to functional status, cognitive ability, or social support system  Outcome: Progressing     Problem: Knowledge Deficit  Goal: Patient/family/caregiver demonstrates understanding of disease process, treatment plan, medications, and discharge instructions  Description: Complete learning assessment and assess knowledge base.   Interventions:  - Provide teaching at level of understanding  - Provide teaching via preferred learning methods  Outcome: Progressing     Problem: CARDIOVASCULAR - ADULT  Goal: Maintains optimal cardiac output and hemodynamic stability  Description: INTERVENTIONS:  - Monitor I/O, vital signs and rhythm  - Monitor for S/S and trends of decreased cardiac output  - Administer and titrate ordered vasoactive medications to optimize hemodynamic stability  - Assess quality of pulses, skin color and temperature  - Assess for signs of decreased coronary artery perfusion  - Instruct patient to report change in severity of symptoms  Outcome: Progressing  Goal: Absence of cardiac dysrhythmias or at baseline rhythm  Description: INTERVENTIONS:  - Continuous cardiac monitoring, vital signs, obtain 12 lead EKG if ordered  - Administer antiarrhythmic and heart rate control medications as ordered  - Monitor electrolytes and administer replacement therapy as ordered  Outcome: Progressing     Problem: METABOLIC, FLUID AND ELECTROLYTES - ADULT  Goal: Electrolytes maintained within normal limits  Description: INTERVENTIONS:  - Monitor labs and assess patient for signs and symptoms of electrolyte imbalances  - Administer electrolyte replacement as ordered  - Monitor response to electrolyte replacements, including repeat lab results as appropriate  - Instruct patient on fluid and nutrition as appropriate  Outcome: Progressing  Goal: Fluid balance maintained  Description: INTERVENTIONS:  - Monitor labs   - Monitor I/O and WT  - Instruct patient on fluid and nutrition as appropriate  - Assess for signs & symptoms of volume excess or deficit  Outcome: Progressing  Goal: Glucose maintained within target range  Description: INTERVENTIONS:  - Monitor Blood Glucose as ordered  - Assess for signs and symptoms of hyperglycemia and hypoglycemia  - Administer ordered medications to maintain glucose within target range  - Assess nutritional intake and initiate nutrition service referral as needed  Outcome: Progressing     Problem: SKIN/TISSUE INTEGRITY - ADULT  Goal: Skin Integrity remains intact(Skin Breakdown Prevention)  Description: Assess:  -Perform Hung assessment every shift   -Clean and moisturize skin every shift  -Inspect skin when repositioning, toileting, and assisting with ADLS  -Assess under medical devices  -Assess extremities for adequate circulation and sensation     Bed Management:  -Have minimal linens on bed & keep smooth, unwrinkled  -Change linens as needed when moist or perspiring  -Avoid sitting or lying in one position for more than 2 hours while in bed  -Keep HOB at 45 degrees     Toileting:  -Offer bedside commode  -Assess for incontinence every 2 hours  -Use incontinent care products after each incontinent episode     Activity:  -Mobilize patient 4 times a day  -Encourage activity and walks on unit  -Encourage or provide ROM exercises   -Turn and reposition patient every 2 Hours  -Use appropriate equipment to lift or move patient in bed  -Instruct/ Assist with weight shifting every 30 minutes when out of bed in chair  -Consider limitation of chair time 2 hour intervals    Skin Care:  -Avoid use of baby powder, tape, friction and shearing, hot water or constrictive clothing  -Relieve pressure over bony prominences using allevyn  -Do not massage red bony areas    Next Steps:  -Teach patient strategies to minimize risks   -Consider consults to  interdisciplinary teams   Outcome: Progressing  Goal: Incision(s), wounds(s) or drain site(s) healing without S/S of infection  Description: INTERVENTIONS  - Assess and document dressing, incision, wound bed, drain sites and surrounding tissue  - Provide patient and family education  - Perform skin care/dressing changes every shift  Outcome: Progressing  Goal: Pressure injury heals and does not worsen  Description: Interventions:  - Implement low air loss mattress or specialty surface (Criteria met)  - Apply silicone foam dressing  - Instruct/assist with weight shifting every 30 minutes when in chair   - Limit chair time to 2 hour intervals  - Use special pressure reducing interventions such as offloading when in chair   - Apply fecal or urinary incontinence containment device   - Perform passive or active ROM every shift  - Turn and reposition patient & offload bony prominences every 2 hours   - Utilize friction reducing device or surface for transfers   - Consider consults to  interdisciplinary teams   - Use incontinent care products after each incontinent episode   - Consider nutrition services referral as needed  Outcome: Progressing     Problem: MOBILITY - ADULT  Goal: Maintain or return to baseline ADL function  Description: INTERVENTIONS:  -  Assess patient's ability to carry out ADLs; assess patient's baseline for ADL function and identify physical deficits which impact ability to perform ADLs (bathing, care of mouth/teeth, toileting, grooming, dressing, etc.)  - Assess/evaluate cause of self-care deficits   - Assess range of motion  - Assess patient's mobility; develop plan if impaired  - Assess patient's need for assistive devices and provide as appropriate  - Encourage maximum independence but intervene and supervise when necessary  - Involve family in performance of ADLs  - Assess for home care needs following discharge   - Consider OT consult to assist with ADL evaluation and planning for discharge  - Provide patient education as appropriate  Outcome: Progressing  Goal: Maintains/Returns to pre admission functional level  Description: INTERVENTIONS:  - Perform BMAT or MOVE assessment daily.   - Set and communicate daily mobility goal to care team and patient/family/caregiver. - Collaborate with rehabilitation services on mobility goals if consulted  - Perform Range of Motion 4 times a day. - Reposition patient every 2 hours.   - Dangle patient 2 times a day  - Stand patient 2 times a day  - Ambulate patient 2 times a day  - Out of bed to chair 2 times a day   - Out of bed for meals 2 times a day  - Out of bed for toileting  - Record patient progress and toleration of activity level   Outcome: Progressing     Problem: Prexisting or High Potential for Compromised Skin Integrity  Goal: Skin integrity is maintained or improved  Description: INTERVENTIONS:  - Identify patients at risk for skin breakdown  - Assess and monitor skin integrity  - Assess and monitor nutrition and hydration status  - Monitor labs   - Assess for incontinence   - Turn and reposition patient  - Assist with mobility/ambulation  - Relieve pressure over bony prominences  - Avoid friction and shearing  - Provide appropriate hygiene as needed including keeping skin clean and dry  - Evaluate need for skin moisturizer/barrier cream  - Collaborate with interdisciplinary team   - Patient/family teaching  - Consider wound care consult   Outcome: Progressing     Problem: Nutrition/Hydration-ADULT  Goal: Nutrient/Hydration intake appropriate for improving, restoring or maintaining nutritional needs  Description: Monitor and assess patient's nutrition/hydration status for malnutrition. Collaborate with interdisciplinary team and initiate plan and interventions as ordered. Monitor patient's weight and dietary intake as ordered or per policy. Utilize nutrition screening tool and intervene as necessary. Determine patient's food preferences and provide high-protein, high-caloric foods as appropriate.      INTERVENTIONS:  - Monitor oral intake, urinary output, labs, and treatment plans  - Assess nutrition and hydration status and recommend course of action  - Evaluate amount of meals eaten  - Assist patient with eating if necessary   - Allow adequate time for meals  - Recommend/ encourage appropriate diets, oral nutritional supplements, and vitamin/mineral supplements  - Order, calculate, and assess calorie counts as needed  - Recommend, monitor, and adjust tube feedings and TPN/PPN based on assessed needs  - Assess need for intravenous fluids  - Provide specific nutrition/hydration education as appropriate  - Include patient/family/caregiver in decisions related to nutrition  Outcome: Progressing

## 2023-08-18 NOTE — ASSESSMENT & PLAN NOTE
· Dx March 2023  · S/p lumpectomy 5/30/2023  · Follows with Dr. Gordy Donahue outpatient  · Plan for radiation to start soon

## 2023-08-18 NOTE — QUICK NOTE
Transferred to [facility] under Dr. Tyson Stephenson. Update given. ID to continue to follow.

## 2023-08-18 NOTE — ASSESSMENT & PLAN NOTE
Lab Results   Component Value Date    EGFR 19 08/17/2023    EGFR 17 08/16/2023    EGFR 17 08/16/2023    CREATININE 2.39 (H) 08/17/2023    CREATININE 2.71 (H) 08/16/2023    CREATININE 2.70 (H) 08/16/2023     · Baseline creatinine appears to be between 1.3-1.7  · Admission creatinine 1.64  · Peak creatinine 2.91, now currently 2.39  · D/C IV Lasix (received 40mg x 2 8/15 and once 8/16 AM)   · Hold home demadex   · S/p 30ml/kg IVF 8/16 with improvement in creatinine   · Trend BUN/creatinine  · Avoid nephrotoxic agents  · Strict I&O  · U/S kidneys/bladder 8/15-Unremarkable kidneys without evidence of hydronephrosis

## 2023-08-18 NOTE — PROGRESS NOTES
[address] NOTE   Herb Murphy 72 y.o. female MRN: [id number]  Unit/Bed#: ICU 01 Encounter: [id number]  Reason for Consult: SEBASTIAN on CKD    ASSESSMENT and PLAN:  81-year-old female with history of CKD admitted with sepsis secondary to pneumonia. We are consulted for management of SEBASTIAN on CKD. 1. Acute kidney injury. Etiology of acute kidney injury most likely ATN in setting of infection +/- episodes of hypotension and contribution of cardiorenal syndrome. Etiology of CKD most likely hypertension, diabetes, cardiorenal syndrome. Creatinine at baseline is 1.5-1.8, creatinine at presentation 1.64, peak creatinine 2.9 on 08/15, creatinine trending down to 1.9 today. Urinalysis showed trace protein, 0-1 RBC, 4-10 WBC. UACR 55 mg/g 03/2023. Kidney ultrasound did not show hydronephrosis. She was given a trial of IV Lasix 08/15 in 08/16 which led to improvement in her serum creatinine. However she had a drop in her blood pressure on 08/16 and diuretics were discontinued. She continues to have signs of volume overload especially evidenced by bilateral lower extremity swelling but is currently in atrial fibrillation with RVR and respiratory status remained stable. Continue to observe diuretic holiday. Trend BMP. No indication for renal replacement therapy. 2. Hypertension/volume. She has history of diastolic CHF and follows with heart failure service. Home medications include Toprol-XL 50 mg twice daily and torsemide 40 mg daily. CT chest on admission consistent with mild septal thickening suggestive of interstitial edema. Cardiac BNP was also elevated at 213. Chest x-ray 08/16 did not show any evidence of pulmonary edema and is probably improved after administration of diuretics since admission. Given ongoing sepsis and current state of atrial fibrillation with RVR, can observe diuretic holiday today. 3. Anemia of CKD. Baseline hemoglobin is around 10-11.   Currently hemoglobin 10.5.  Serum ferritin 168, iron saturation 5 consistent with functional iron deficiency. Hold off iron supplementation in setting of pneumonia. We will consider intravenous iron once she recovers from infection. No indication for erythropoietin stimulating agents. 4. Hyperkalemia. This is currently resolved. 5. Bone mineral disease. 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 Ionized calcium was low 08/15 and she was given IV calcium gluconate due to intermittent twitching. Calcium level is currently normalized and twitching is resolved. 6. Pneumonia. Currently on IV vancomycin by level. Cefepime has been discontinued due to encephalopathy. 7. New onset twitching 08/16. This was most likely due to combination of hypocalcemia and high-dose of pregabalin in setting of SEBASTIAN. Twitching is currently resolved after intravenous calcium supplementation and holding pregabalin. 8.  Change in mental status. Stroke alert was called 08/17 and she underwent CT head, MRI brain and MRA of head and neck that have been unrevealing at this time. Discussed with ICU team.  After discussion, we agreed that kidney function continues to improve and to observe diuretic holiday to avoid further hemodynamic insults. SUBJECTIVE / 24H INTERVAL HISTORY:  Urine output recorded as 900 cc. Stroke alert was called yesterday due to change in mental status. MRI was negative for stroke. However she continues to have confusion this morning. Responding yes to every question.     Review of Systems   Unable to perform ROS: Mental status change     OBJECTIVE:  Current Weight: Weight - Scale: 124 kg (272 lb 4.3 oz)  Vitals:    08/18/23 0218 08/18/23 0300 08/18/23 0400 08/18/23 0553   BP: 145/65 135/62 127/61    Pulse: (!) 132 (!) 126 (!) 121    Resp: (!) 29 (!) 30 (!) 23    Temp:   97.8 °F (36.6 °C)    TempSrc:   Temporal    SpO2: 95% 94% 95%    Weight:    124 kg (272 lb 4.3 oz) Height:           Intake/Output Summary (Last 24 hours) at 8/18/2023 0757  Last data filed at 8/18/2023 0222  Gross per 24 hour   Intake --   Output 950 ml   Net -950 ml     Physical Exam  Vitals and nursing note reviewed. Constitutional:       General: She is not in acute distress. Appearance: She is well-developed. HENT:      Head: Normocephalic and atraumatic. Eyes:      Conjunctiva/sclera: Conjunctivae normal.   Cardiovascular:      Rate and Rhythm: Normal rate and regular rhythm. Heart sounds: No murmur heard. Pulmonary:      Effort: Pulmonary effort is normal. No respiratory distress. Breath sounds: Wheezing present. Abdominal:      Palpations: Abdomen is soft. Tenderness: There is no abdominal tenderness. Musculoskeletal:         General: No swelling. Cervical back: Neck supple. Right lower leg: Edema present. Left lower leg: Edema present. Skin:     General: Skin is warm and dry. Capillary Refill: Capillary refill takes less than 2 seconds. Neurological:      Mental Status: She is alert. She is disoriented.    Psychiatric:         Mood and Affect: Mood normal.       Medications:    Current Facility-Administered Medications:   •  acetaminophen (TYLENOL) tablet 650 mg, 650 mg, Oral, Q6H PRN, RK Vee  •  ammonium lactate (LAC-HYDRIN) 12 % lotion 1 Application, 1 Application, Topical, BID PRN, RK Mccray, 1 Application at 38/12/93 7251  •  apixaban (ELIQUIS) tablet 5 mg, 5 mg, Oral, BID, RK Vee  •  aspirin chewable tablet 81 mg, 81 mg, Oral, Daily, RK Sellers, 81 mg at 08/17/23 3496  •  atorvastatin (LIPITOR) tablet 40 mg, 40 mg, Oral, Daily With Dinner, RK Cerda  •  fluticasone (FLONASE) 50 mcg/act nasal spray 1 spray, 1 spray, Nasal, Daily, RK Vee, 1 spray at 08/17/23 8025  •  Fluticasone Furoate-Vilanterol 100-25 mcg/actuation 1 puff, 1 puff, Inhalation, Daily, RK Vee, 1 puff at 08/17/23 3828  •  insulin glargine (LANTUS) subcutaneous injection 25 Units 0.25 mL, 25 Units, Subcutaneous, HS, RK Vee, 25 Units at 08/17/23 2142  •  insulin lispro (HumaLOG) 100 units/mL subcutaneous injection 10 Units, 10 Units, Subcutaneous, TID With Meals, RK Vee, 10 Units at 08/16/23 1216  •  insulin lispro (HumaLOG) 100 units/mL subcutaneous injection 2-12 Units, 2-12 Units, Subcutaneous, TID AC, 4 Units at 08/17/23 1628 **AND** Fingerstick Glucose (POCT), , , TID AC, RK Vee  •  insulin lispro (HumaLOG) 100 units/mL subcutaneous injection 2-12 Units, 2-12 Units, Subcutaneous, HS, RK Vee, 4 Units at 08/14/23 2126  •  insulin lispro (HumaLOG) 100 units/mL subcutaneous injection 2-12 Units, 2-12 Units, Subcutaneous, 0200, RK Vee, 2 Units at 08/18/23 0215  •  metoprolol (LOPRESSOR) injection 5 mg, 5 mg, Intravenous, Q6H PRN, RK Vee  •  metoprolol tartrate (LOPRESSOR) tablet 25 mg, 25 mg, Oral, Q12H [facility], Diann Siu, RK, 25 mg at 08/17/23 1726  •  montelukast (SINGULAIR) tablet 10 mg, 10 mg, Oral, HS, RK Vee, 10 mg at 08/17/23 2142  •  nystatin (MYCOSTATIN) powder, , Topical, BID, RK Vee, 1 Application at 17/40/75 1727  •  ondansetron (ZOFRAN) injection 4 mg, 4 mg, Intravenous, Q6H PRN, RK Vee, 4 mg at 08/13/23 1752  •  pantoprazole (PROTONIX) EC tablet 40 mg, 40 mg, Oral, Daily, Libby Spironello V, CRNP, 40 mg at 08/17/23 2805  •  saccharomyces boulardii (FLORASTOR) capsule 250 mg, 250 mg, Oral, BID, Libby Spironello V, CRNP, 250 mg at 08/17/23 1726  •  vancomycin (VANCOCIN) 1,250 mg in sodium chloride 0.9 % 250 mL IVPB, 15 mg/kg (Adjusted), Intravenous, Daily PRN, Amrit Nascimento PA-C    Laboratory Results:  Results from last 7 days   Lab Units 08/18/23  6218 08/17/23  0809 08/17/23  0547 08/16/23  1322 08/16/23  0455 08/15/23  0432 08/14/23  0559 08/13/23  0446   WBC Thousand/uL 14.96*  --  14.72* 13.31* 16.06* 11.61* 14.02* 14.04*   HEMOGLOBIN g/dL 10.5*  --  10.1* 10.0* 10.3* 9.1* 9.6* 10.1*   HEMATOCRIT % 34.3*  --  32.1* 31.4* 32.6* 30.2* 31.8* 33.0*   PLATELETS Thousands/uL 260  --  184 185 173 143* 142* 133*   POTASSIUM mmol/L 4.8 4.9  --  4.7 5.5* 4.6 4.5 4.2   CHLORIDE mmol/L 109* 106  --  103 104 102 101 104   CO2 mmol/L 26 26  --  28 28 28 27 29   BUN mg/dL 58* 65*  --  67* 65* 58* 47* 37*   CREATININE mg/dL 1.88* 2.39*  --  2.71* 2.70* 2.91* 2.69* 2.01*   CALCIUM mg/dL 9.3 8.6  --  8.8 8.5 7.9* 8.2* 7.9*   MAGNESIUM mg/dL 2.3 2.0  --   --   --   --   --   --    PHOSPHORUS mg/dL  --  4.4*  --   --   --   --   --   --      Portions of the record may have been created with voice recognition software. Occasional wrong word or "sound a like" substitutions may have occurred due to the inherent limitations of voice recognition software. Read the chart carefully and recognize, using context, where substitutions have occurred. If you have any questions, please contact the dictating provider.

## 2023-08-18 NOTE — PROGRESS NOTES
Herb Murphy is a 72 y.o. female who is currently ordered Vancomycin IV with management by the Pharmacy Consult service. Relevant clinical data and objective / subjective history reviewed. Vancomycin Assessment:  Indication and Goal AUC/Trough: Pneumonia (goal -600, trough >10), -600, trough >10  Clinical Status: improving  Micro:     Renal Function:  SCr: 1.88 mg/dL  CrCl: 34.5 mL/min  Renal replacement: Not on dialysis  Days of Therapy: 3  Current Dose: 1250mg IV qd prn trough <15  Vancomycin Plan:  New Dosin mg IV prn trough < 15  Estimated AUC: N/A  Estimated Trough: <15mcg/mL  Next Level: 0600 on 23  Renal Function Monitoring: Daily BMP and UOP  Pharmacy will continue to follow closely for s/sx of nephrotoxicity, infusion reactions and appropriateness of therapy. BMP and CBC will be ordered per protocol. We will continue to follow the patient’s culture results and clinical progress daily.     Mary Ann Schmid, Pharmacist

## 2023-08-18 NOTE — SEDATION DOCUMENTATION
Pt had bleeding at tube site from prior right breast abscess tube placement. Tube changed to 12 fr, bleeder cauterized and dstat to tract. Pt required some sedation for procedure. Tolerated with sedation.

## 2023-08-18 NOTE — BRIEF OP NOTE (RAD/CATH)
INTERVENTIONAL RADIOLOGY PROCEDURE NOTE    Date: 8/18/2023    Procedure: IR DRAINAGE TUBE REPLACEMENT    Preoperative diagnosis:   1. Sepsis ([address])    2. Pneumonia    3. Hypoxemia    4. Fever    5. Fatigue    6. Ductal carcinoma in situ (DCIS) of right breast    7. Acute kidney injury superimposed on chronic kidney disease ([address])    8. Stroke (cerebrum) ([facility])    9. Stroke [facility])         Postoperative diagnosis: Same. Surgeon: Agustín Rivera MD     Assistant: None. No qualified resident was available. Blood loss: minimal    Specimens: none    Findings: Bleeding from the insertion site. Area cauterized and thrombin gel injected to stop the bleeding just under the drain incision site. Bleeding stopped. Tube upsized to a 12 [place] tube. Complications: None immediate.     Anesthesia: conscious sedation

## 2023-08-18 NOTE — ASSESSMENT & PLAN NOTE
· Suspected to be secondary to fever, sepsis, and SEBASTIAN/uremia  · Mental status had improved after fever control  · Acute change in mental status yesterday morning with sudden expressive and receptive aphasia and stroke alert was called. · Not tPA candidate secondary to Eliquis  · CT head negative  · 8/17: MRI brain-No acute infarction, edema, or pathologic intra-axial enhancement. Mild chronic microangiopathic ischemic changes. · 8/17: MRA carotids-There is no evidence for a hemodynamically significant extracranial carotid stenosis. The cervical vertebral arteries are patent. · 8/17: MRA head-no LVO  · Sepsis treatment as above  · Stroke pathway, neuro checks per protocol  · Aspirin started, continue home statin   · Continues to have expressive aphasia, slightly improved with ability to answer some questions but most questions answers by stating "okay".  Follows commands   · Delirium precautions; regulate sleep/wake cycle, environmental controls, daily CAM ICU   · TSH WNL  · Ammonia pending this morning   · Cefepime d/c yesterday d/t concerns that it was contributing to encephalopathy

## 2023-08-18 NOTE — ASSESSMENT & PLAN NOTE
Lab Results   Component Value Date    HGBA1C 9.3 (H) 08/13/2023       Recent Labs     08/17/23  1108 08/17/23  1548 08/17/23 2036 08/18/23  0138   POCGLU 184* 221* 205* 198*       Blood Sugar Average: Last 72 hrs:  (P) [id number]     · Continue home regimen 10 units humalog with meals and 25 units lantus @ HS + SSI Alg #4 coverage ACHS  · Goal blood glucose less than 180

## 2023-08-18 NOTE — OCCUPATIONAL THERAPY NOTE
Occupational Therapy Cancellation     Patient Name: Josué Campbell  SADWP'W Date: 8/18/2023  Problem List  Principal Problem:    Septic shock [facility])  Active Problems:     Moderate persistent asthma without complication    Morbid obesity with BMI of 45.0-49.9, adult (HCC)    Paroxysmal atrial fibrillation (HCC)    Mixed hyperlipidemia    Essential hypertension    Acute kidney injury superimposed on chronic kidney disease (HCC)    Toxic metabolic encephalopathy    KATRINA (obstructive sleep apnea)    Chronic respiratory failure with hypoxia and hypercapnia (HCC)    Type 2 diabetes mellitus with hyperglycemia, without long-term current use of insulin (HCC)    Ductal carcinoma in situ (DCIS) of right breast    Cellulitis    Cerebrovascular accident (CVA) [facility])    Past Medical History  Past Medical History:   Diagnosis Date    SEBASTIAN (acute kidney injury) ([address]) 01/23/2023    Anemia     Asthma     Atrial fibrillation ([address])     Chronic kidney disease     COVID-19 January 2022    Diabetes mellitus (HCC)     GERD (gastroesophageal reflux disease)     Hyperlipidemia     Hypertension     PONV (postoperative nausea and vomiting)     Sleep apnea     wear BIPAP    SVT (supraventricular tachycardia) ([address])      Past Surgical History  Past Surgical History:   Procedure Laterality Date    APPENDECTOMY      BREAST BIOPSY Right     years ago when she was 23    BREAST BIOPSY Right 03/13/2023    BREAST LUMPECTOMY Right 5/30/2023    Procedure: RIGHT BREAST KRIS  DIRECTED LUMPECTOMY - KRIS Alvarez;  Surgeon: Santino Sears MD;  Location: [facility];  Service: Surgical Oncology    CYSTOSCOPY W/ LASER LITHOTRIPSY Left 07/12/2016    Procedure: CYSTOSCOPY URETEROSCOPY WITH LITHOTRIPSY HOLMIUM LASER, RETROGRADE PYELOGRAM AND INSERTION STENT URETERAL;  Surgeon: Tammy Fuentes MD;  Location: [facility];  Service:     DILATION AND CURETTAGE OF UTERUS      IR THORACENTESIS  03/18/2022    JOINT REPLACEMENT      right knee    KNEE ARTHROPLASTY Right     MAMMO NEEDLE LOCALIZATION LEFT (ALL INC) EACH ADD Right 4/24/2023    MAMMO STEREOTACTIC BREAST BIOPSY RIGHT (ALL INC) Right 03/13/2023    High Risk Lesion    AK ARTHROPLASTY GLENOHUMERAL JOINT TOTAL SHOULDER Left 03/01/2022    Procedure: ARTHROPLASTY SHOULDER REVERSE;  Surgeon: Nohemi Hoang MD;  Location: [facility];  Service: Orthopedics    AK CYSTO BLADDER W/URETERAL CATHETERIZATION Left 06/29/2016    Procedure: CYSTOSCOPY RETROGRADE PYELOGRAM WITH INSERTION STENT URETERAL, left;  Surgeon: Mason Brambila MD;  Location: [facility];  Service: Urology    SHOULDER ARTHROTOMY Left          08/18/23 1416   Note Type   Note type Cancelled Session  (procedure w/ another provider at bedside)   Cancel Reasons Other   Additional Comments Chart review completed and attempted to see pt for OT re-eval. Presently w/ provider / procedure bedside by IR.  Will cancel and continue to follow as appropriate / schedule allows     Bill Dubin, OTR/COLUMBA  [id number]  [id number]

## 2023-08-18 NOTE — BRIEF OP NOTE (RAD/CATH)
INTERVENTIONAL RADIOLOGY PROCEDURE NOTE    Date: 8/18/2023    Procedure: IR DRAINAGE TUBE PLACEMENT     Preoperative diagnosis:   1. Pneumonia    2. Sepsis ([address])    3. Hypoxemia    4. Fever    5. Fatigue    6. Ductal carcinoma in situ (DCIS) of right breast    7. Acute kidney injury superimposed on chronic kidney disease ([address])    8. Stroke (cerebrum) ([facility])    9. Stroke [facility])       Postoperative diagnosis: Same. Surgeon: Agustín Rivera MD     Assistant: None. No qualified resident was available. Blood loss: minimal    Specimens: 160 cc of purulent fluid    Findings: Successful ultrasound guided drainage tube placement into the large right breast abscess. Bleeding around the catheter. Pressure dressing applied. Recommend stopping eliquis for 3 days. Plan:  Tube to ELIESER bulb suction. Tube check in 2 weeks. Flush qd with 10cc NSS. Complications: None immediate.     Anesthesia: local

## 2023-08-18 NOTE — SEDATION DOCUMENTATION
Procedure ended , maria esther, RN at bedside. Right breast dressing clean dry and intact, no bleeding noted.

## 2023-08-18 NOTE — ASSESSMENT & PLAN NOTE
· Continue home Eliquis and metoprolol  · PRN Metoprolol for sustained HR greater than 120  · Optimize electrolytes  · Monitor rhythm on tele

## 2023-08-18 NOTE — PROGRESS NOTES
Progress Note - Infectious Disease   Nobie Gal 72 y.o. female MRN: [id number]  Unit/Bed#: ICU 01 Encounter: [id number]      Impression/Plan:  1. Severe sepsis, presenting with fever of 103 F, tachycardia, tachypnea and leukocytosis and now hypotension worsening. Initially presumed pneumonia. Continues to spike fever and now hypotension despite 6 days of IV cephalosporins. Blood cultures x 6, urinary antigens, RSV/FLU/COVID19 screen negative. 8/14/23 CT chest no definite pneumonia. procal 0.62. unclear source, possible aspiration. 8/16/23 CT with small pleural effusions. MRSA nares +, #2 suspicious source   -continue Vancomycin IV renal dosed by pharmacy   -follow-up cultures and adjust antibiotics as needed  -monitor temperature and hemodynamics  -serial exam  -monitor am labs  -aspiration precautions  -follow up IR drainage and cultures     2. Right breast collection. In setting of #7   8/16/23 CT chest: right breast loculated fluid collection 11 cm  S/p IR aspiration of 160 mL of purulent fluid.  -continue IV Vancomycin  -consulted IR  -follow up IR drainage and cultures  -serial exam    3. Encephalopathy. With increased lethargy/altered mental status. Consider aspiration. Consider medication side effect. 8/17/23 CT brain: no acute intracranial abnormality. 8/18/23 MRI/MRA Head/Neck - Stroke work up negative to date  -discontinued Cefepime 8/17/23  -Management/work-up as above  -follow swallow   -aspiration precautions  -Neurology on board     4. SEBASTIAN on CKD. Peak creatinine 2.91 > 1.88. Baseline 1.7  -renal dose adjust antibiotic as needed  -volume management per nephrology  -Vancomycin dosing per pharmacy  -recheck BMP     5. Type 2 diabetes mellitus. Hemoglobin A1c on 8/13/2023 9.3. Risk factor for infection  -Blood glucose management per primary care team     6. Morbid Obesity. BMI 49.6. Can affect antibiotic absorption/dosing     7.  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 Radiation was to start at [place] 2023. Inframammary bilateral intertrigo with powder applied. 23 CT chest: right breast loculated fluid collection 11cm  -serial exam  -continue Vancomycin  -management/work up as above     Antibiotics:  Vancomycin D3     Above impression and plan discussed in detail with patient, RN, Dr. Zhen Gibson, and Brenda Maddox from critical care team.  I have spent a total time of 50 minutes on 23 in caring for this patient including Diagnostic results, Prognosis, Risks and benefits of tx options, Instructions for management, Patient and family education, Importance of tx compliance, Risk factor reductions, Impressions, Counseling / Coordination of care, Documenting in the medical record, Reviewing / ordering tests, medicine, procedures  , Obtaining or reviewing history   and Communicating with other healthcare professionals . We will see patient again 23. Please call ID on call physician in meantime if questions. Subjective:  Patient has declining temperature since addition of Vancomycin, no reported chills, sweats; no nausea, vomiting, diarrhea; no cough, shortness of breath; + right breast tenderness on exam today  On dysphagia 2 diet. Objective:  Vitals:  Temp:  [97 °F (36.1 °C)-98.4 °F (36.9 °C)] 97 °F (36.1 °C)  HR:  [] 97  Resp:  [22-31] 31  BP: (115-167)/(55-87) 167/87  SpO2:  [91 %-97 %] 91 %  Temp (24hrs), Av.8 °F (36.6 °C), Min:97 °F (36.1 °C), Max:98.4 °F (36.9 °C)  Current: Temperature: (!) 97 °F (36.1 °C)    Physical Exam:   General Appearance:  72year old female, acute on chronically debilitated, propped in ICU SD bed, arousable to name, + expressive aphasia: primarily answers "ok and help me" throughout exam, + confusion, no acute distress.    HEENT: Atraumatic normocephalic   Throat: Oropharynx moist.   Pulmonary:   Decreased breath sounds, Normal respiratory excursion without accessory muscle use, statting 91% on 2L NCO2 Cardiac:  RRR   Abdomen:   Soft, non-tender, protuberant pannus, + bowel sounds   Extremities: No edema   : Purewick with clear, yellow urine in canister, no SPT   Psychiatric: Awake, cooperative   Skin: No new rashes. IV site nontender. Inframammary intertrigo drying with powder applied,                  Labs, Imaging, & Other studies:   All pertinent labs and imaging studies were personally reviewed  Results from last 7 days   Lab Units 08/18/23  1219 08/18/23  0543 08/17/23  0547   WBC Thousand/uL 14.95* 14.96* 14.72*   HEMOGLOBIN g/dL 10.3* 10.5* 10.1*   PLATELETS Thousands/uL 250 260 184     Results from last 7 days   Lab Units 08/18/23  0543 08/17/23  0809 08/16/23  1322 08/12/23  0512 08/11/23  1236   SODIUM mmol/L 143 139 139   < > 137   POTASSIUM mmol/L 4.8 4.9 4.7   < > 4.5   CHLORIDE mmol/L 109* 106 103   < > 97   CO2 mmol/L 26 26 28   < > 33*   BUN mg/dL 58* 65* 67*   < > 35*   CREATININE mg/dL 1.88* 2.39* 2.71*   < > 1.64*   EGFR ml/min/1.73sq m 26 19 17   < > 31   CALCIUM mg/dL 9.3 8.6 8.8   < > 9.0   AST U/L  --   --  9*  --  15   ALT U/L  --   --  8  --  12   ALK PHOS U/L  --   --  43  --  54    < > = values in this interval not displayed. Results from last 7 days   Lab Units 08/16/23  1725 08/16/23  1349 08/16/23  1339 08/16/23  1322 08/13/23  1956 08/12/23  0557 08/11/23  1308 08/11/23  1236   BLOOD CULTURE   --  No Growth at 24 hrs.  --  No Growth at 24 hrs. No Growth After 4 Days. No Growth After 4 Days. --   --  No Growth After 5 Days. No Growth After 5 Days. URINE CULTURE   --   --   --   --   --   --  >100,000 cfu/ml Gardnerella vaginalis*  <10,000 cfu/ml  --    MRSA CULTURE ONLY  1+ Growth of Methicillin Resistant Staphylococcus aureus*  --  Methicillin Resistant Staphylococcus aureus isolated*  This patient requires contact isolation precautions per [facility] law. Contact precautions are not required in [place] for nasal surveillance cultures.   --   --   --   --   -- LEGIONELLA URINARY ANTIGEN   --   --   --   --   --  Negative  --   --      Results from last 7 days   Lab Units 08/18/23  0543 08/17/23  0547 08/16/23  1322 08/15/23  0432 08/14/23  0559 08/12/23  0512 08/11/23  1236   PROCALCITONIN ng/ml 0.40* 0.56* 0.62* 0.36* 0.42* 0.23 0.08         Results from last 7 days   Lab Units 08/16/23  0455   FERRITIN ng/mL 168

## 2023-08-18 NOTE — SEDATION DOCUMENTATION
Procedure ended. 10 fr drainage tube placed in right breast bedside in ICU. Tube to bulb suction. 160 ml purulent fluid drained, specimen sent to lab. Bleeding noted at tube site, pressure dressing applied. Dr. Song Abeba aware.

## 2023-08-19 PROBLEM — N61.1 BREAST ABSCESS OF FEMALE: Status: ACTIVE | Noted: 2023-01-01

## 2023-08-19 LAB
ANION GAP SERPL CALCULATED.3IONS-SCNC: 6 MMOL/L
BUN SERPL-MCNC: 53 MG/DL (ref 5–25)
CALCIUM SERPL-MCNC: 8.2 MG/DL (ref 8.4–10.2)
CHLORIDE SERPL-SCNC: 117 MMOL/L (ref 96–108)
CO2 SERPL-SCNC: 24 MMOL/L (ref 21–32)
CREAT SERPL-MCNC: 1.62 MG/DL (ref 0.6–1.3)
GFR SERPL CREATININE-BSD FRML MDRD: 31 ML/MIN/1.73SQ M
GLUCOSE SERPL-MCNC: 121 MG/DL (ref 65–140)
GLUCOSE SERPL-MCNC: 122 MG/DL (ref 65–140)
GLUCOSE SERPL-MCNC: 148 MG/DL (ref 65–140)
GLUCOSE SERPL-MCNC: 162 MG/DL (ref 65–140)
GLUCOSE SERPL-MCNC: 169 MG/DL (ref 65–140)
GLUCOSE SERPL-MCNC: 184 MG/DL (ref 65–140)
MAGNESIUM SERPL-MCNC: 2.2 MG/DL (ref 1.9–2.7)
PHOSPHATE SERPL-MCNC: 3.3 MG/DL (ref 2.3–4.1)
POTASSIUM SERPL-SCNC: 4.3 MMOL/L (ref 3.5–5.3)
SODIUM SERPL-SCNC: 147 MMOL/L (ref 135–147)
VANCOMYCIN SERPL-MCNC: 21.6 UG/ML (ref 10–20)

## 2023-08-19 PROCEDURE — 82948 REAGENT STRIP/BLOOD GLUCOSE: CPT

## 2023-08-19 PROCEDURE — 99233 SBSQ HOSP IP/OBS HIGH 50: CPT | Performed by: INTERNAL MEDICINE

## 2023-08-19 PROCEDURE — 99232 SBSQ HOSP IP/OBS MODERATE 35: CPT | Performed by: INTERNAL MEDICINE

## 2023-08-19 PROCEDURE — 83735 ASSAY OF MAGNESIUM: CPT | Performed by: NURSE PRACTITIONER

## 2023-08-19 PROCEDURE — 80202 ASSAY OF VANCOMYCIN: CPT | Performed by: NURSE PRACTITIONER

## 2023-08-19 PROCEDURE — 94760 N-INVAS EAR/PLS OXIMETRY 1: CPT

## 2023-08-19 PROCEDURE — 84100 ASSAY OF PHOSPHORUS: CPT | Performed by: NURSE PRACTITIONER

## 2023-08-19 PROCEDURE — 80048 BASIC METABOLIC PNL TOTAL CA: CPT | Performed by: NURSE PRACTITIONER

## 2023-08-19 RX ORDER — METOPROLOL TARTRATE 50 MG/1
50 TABLET, FILM COATED ORAL EVERY 12 HOURS SCHEDULED
Status: DISCONTINUED | OUTPATIENT
Start: 2023-08-19 | End: 2023-08-22 | Stop reason: HOSPADM

## 2023-08-19 RX ORDER — BISACODYL 10 MG
10 SUPPOSITORY, RECTAL RECTAL DAILY PRN
Status: DISCONTINUED | OUTPATIENT
Start: 2023-08-19 | End: 2023-08-22 | Stop reason: HOSPADM

## 2023-08-19 RX ORDER — POLYETHYLENE GLYCOL 3350 17 G/17G
17 POWDER, FOR SOLUTION ORAL DAILY
Status: DISCONTINUED | OUTPATIENT
Start: 2023-08-19 | End: 2023-08-22 | Stop reason: HOSPADM

## 2023-08-19 RX ORDER — SENNOSIDES 8.6 MG
2 TABLET ORAL
Status: DISCONTINUED | OUTPATIENT
Start: 2023-08-19 | End: 2023-08-22 | Stop reason: HOSPADM

## 2023-08-19 RX ADMIN — POLYETHYLENE GLYCOL 3350 17 G: 17 POWDER, FOR SOLUTION ORAL at 11:55

## 2023-08-19 RX ADMIN — FLUTICASONE FUROATE AND VILANTEROL TRIFENATATE 1 PUFF: 100; 25 POWDER RESPIRATORY (INHALATION) at 08:39

## 2023-08-19 RX ADMIN — ATORVASTATIN CALCIUM 40 MG: 40 TABLET, FILM COATED ORAL at 16:55

## 2023-08-19 RX ADMIN — ONDANSETRON 4 MG: 2 INJECTION INTRAMUSCULAR; INTRAVENOUS at 15:47

## 2023-08-19 RX ADMIN — MONTELUKAST 10 MG: 10 TABLET, FILM COATED ORAL at 22:06

## 2023-08-19 RX ADMIN — INSULIN LISPRO 1 UNITS: 100 INJECTION, SOLUTION INTRAVENOUS; SUBCUTANEOUS at 22:09

## 2023-08-19 RX ADMIN — FLUTICASONE PROPIONATE 1 SPRAY: 50 SPRAY, METERED NASAL at 08:39

## 2023-08-19 RX ADMIN — METOPROLOL TARTRATE 5 MG: 5 INJECTION INTRAVENOUS at 04:12

## 2023-08-19 RX ADMIN — ASPIRIN 81 MG CHEWABLE TABLET 81 MG: 81 TABLET CHEWABLE at 08:34

## 2023-08-19 RX ADMIN — PANTOPRAZOLE SODIUM 40 MG: 40 TABLET, DELAYED RELEASE ORAL at 08:38

## 2023-08-19 RX ADMIN — ACETAMINOPHEN 650 MG: 325 TABLET ORAL at 08:34

## 2023-08-19 RX ADMIN — INSULIN LISPRO 5 UNITS: 100 INJECTION, SOLUTION INTRAVENOUS; SUBCUTANEOUS at 11:53

## 2023-08-19 RX ADMIN — ONDANSETRON 4 MG: 2 INJECTION INTRAMUSCULAR; INTRAVENOUS at 08:32

## 2023-08-19 RX ADMIN — SENNOSIDES 17.2 MG: 8.6 TABLET, FILM COATED ORAL at 22:06

## 2023-08-19 RX ADMIN — METOPROLOL TARTRATE 50 MG: 50 TABLET, FILM COATED ORAL at 22:06

## 2023-08-19 RX ADMIN — BISACODYL 10 MG: 10 SUPPOSITORY RECTAL at 12:35

## 2023-08-19 RX ADMIN — NYSTATIN 1 APPLICATION: 100000 POWDER TOPICAL at 08:38

## 2023-08-19 RX ADMIN — INSULIN GLARGINE 25 UNITS: 100 INJECTION, SOLUTION SUBCUTANEOUS at 22:06

## 2023-08-19 RX ADMIN — METOPROLOL TARTRATE 25 MG: 25 TABLET, FILM COATED ORAL at 08:34

## 2023-08-19 RX ADMIN — NYSTATIN: 100000 POWDER TOPICAL at 17:22

## 2023-08-19 RX ADMIN — INSULIN LISPRO 1 UNITS: 100 INJECTION, SOLUTION INTRAVENOUS; SUBCUTANEOUS at 11:52

## 2023-08-19 RX ADMIN — Medication 250 MG: at 17:22

## 2023-08-19 RX ADMIN — INSULIN LISPRO 5 UNITS: 100 INJECTION, SOLUTION INTRAVENOUS; SUBCUTANEOUS at 08:15

## 2023-08-19 RX ADMIN — Medication 250 MG: at 08:35

## 2023-08-19 NOTE — PLAN OF CARE
Problem: RESPIRATORY - ADULT  Goal: Achieves optimal ventilation and oxygenation  Description: INTERVENTIONS:  - Assess for changes in respiratory status  - Assess for changes in mentation and behavior  - Position to facilitate oxygenation and minimize respiratory effort  - Oxygen administered by appropriate delivery if ordered  - Initiate smoking cessation education as indicated  - Encourage broncho-pulmonary hygiene including cough, deep breathe, Incentive Spirometry  - Assess the need for suctioning and aspirate as needed  - Assess and instruct to report SOB or any respiratory difficulty  - Respiratory Therapy support as indicated  Outcome: Progressing     Problem: INFECTION - ADULT  Goal: Absence or prevention of progression during hospitalization  Description: INTERVENTIONS:  - Assess and monitor for signs and symptoms of infection  - Monitor lab/diagnostic results  - Monitor all insertion sites, i.e. indwelling lines, tubes, and drains  - Monitor endotracheal if appropriate and nasal secretions for changes in amount and color  - [place] appropriate cooling/warming therapies per order  - Administer medications as ordered  - Instruct and encourage patient and family to use good hand hygiene technique  - Identify and instruct in appropriate isolation precautions for identified infection/condition  Outcome: Progressing  Goal: Absence of fever/infection during neutropenic period  Description: INTERVENTIONS:  - Monitor WBC    Outcome: Progressing     Problem: SAFETY ADULT  Goal: Patient will remain free of falls  Description: INTERVENTIONS:  - Educate patient/family on patient safety including physical limitations  - Instruct patient to call for assistance with activity   - Consult OT/PT to assist with strengthening/mobility   - Keep Call bell within reach  - Keep bed low and locked with side rails adjusted as appropriate  - Keep care items and personal belongings within reach  - Initiate and maintain comfort rounds  - Make Fall Risk Sign visible to staff  - Offer Toileting every 2 Hours, in advance of need  - Initiate/Maintain bed alarm  - Obtain necessary fall risk management equipment:   - Apply yellow socks and bracelet for high fall risk patients  - Consider moving patient to room near nurses station  Outcome: Progressing  Goal: Maintain or return to baseline ADL function  Description: INTERVENTIONS:  -  Assess patient's ability to carry out ADLs; assess patient's baseline for ADL function and identify physical deficits which impact ability to perform ADLs (bathing, care of mouth/teeth, toileting, grooming, dressing, etc.)  - Assess/evaluate cause of self-care deficits   - Assess range of motion  - Assess patient's mobility; develop plan if impaired  - Assess patient's need for assistive devices and provide as appropriate  - Encourage maximum independence but intervene and supervise when necessary  - Involve family in performance of ADLs  - Assess for home care needs following discharge   - Consider OT consult to assist with ADL evaluation and planning for discharge  - Provide patient education as appropriate  Outcome: Progressing  Goal: Maintains/Returns to pre admission functional level  Description: INTERVENTIONS:  - Perform BMAT or MOVE assessment daily.   - Set and communicate daily mobility goal to care team and patient/family/caregiver. - Collaborate with rehabilitation services on mobility goals if consulted  - Perform Range of Motion 2 times a day. - Reposition patient every 2 hours.   - Dangle patient 2 times a day  - Stand patient 2 times a day  - Out of bed to chair 2 times a day   - Out of bed for meals 2 times a day  - Out of bed for toileting  - Record patient progress and toleration of activity level   Outcome: Progressing     Problem: DISCHARGE PLANNING  Goal: Discharge to home or other facility with appropriate resources  Description: INTERVENTIONS:  - Identify barriers to discharge w/patient and caregiver  - Arrange for needed discharge resources and transportation as appropriate  - Identify discharge learning needs (meds, wound care, etc.)  - Arrange for interpretive services to assist at discharge as needed  - Refer to Case Management Department for coordinating discharge planning if the patient needs post-hospital services based on physician/advanced practitioner order or complex needs related to functional status, cognitive ability, or social support system  Outcome: Progressing     Problem: Knowledge Deficit  Goal: Patient/family/caregiver demonstrates understanding of disease process, treatment plan, medications, and discharge instructions  Description: Complete learning assessment and assess knowledge base.   Interventions:  - Provide teaching at level of understanding  - Provide teaching via preferred learning methods  Outcome: Progressing     Problem: CARDIOVASCULAR - ADULT  Goal: Maintains optimal cardiac output and hemodynamic stability  Description: INTERVENTIONS:  - Monitor I/O, vital signs and rhythm  - Monitor for S/S and trends of decreased cardiac output  - Administer and titrate ordered vasoactive medications to optimize hemodynamic stability  - Assess quality of pulses, skin color and temperature  - Assess for signs of decreased coronary artery perfusion  - Instruct patient to report change in severity of symptoms  Outcome: Progressing  Goal: Absence of cardiac dysrhythmias or at baseline rhythm  Description: INTERVENTIONS:  - Continuous cardiac monitoring, vital signs, obtain 12 lead EKG if ordered  - Administer antiarrhythmic and heart rate control medications as ordered  - Monitor electrolytes and administer replacement therapy as ordered  Outcome: Progressing     Problem: METABOLIC, FLUID AND ELECTROLYTES - ADULT  Goal: Electrolytes maintained within normal limits  Description: INTERVENTIONS:  - Monitor labs and assess patient for signs and symptoms of electrolyte imbalances  - Administer electrolyte replacement as ordered  - Monitor response to electrolyte replacements, including repeat lab results as appropriate  - Instruct patient on fluid and nutrition as appropriate  Outcome: Progressing  Goal: Fluid balance maintained  Description: INTERVENTIONS:  - Monitor labs   - Monitor I/O and WT  - Instruct patient on fluid and nutrition as appropriate  - Assess for signs & symptoms of volume excess or deficit  Outcome: Progressing  Goal: Glucose maintained within target range  Description: INTERVENTIONS:  - Monitor Blood Glucose as ordered  - Assess for signs and symptoms of hyperglycemia and hypoglycemia  - Administer ordered medications to maintain glucose within target range  - Assess nutritional intake and initiate nutrition service referral as needed  Outcome: Progressing     Problem: SKIN/TISSUE INTEGRITY - ADULT  Goal: Skin Integrity remains intact(Skin Breakdown Prevention)  Description: Assess:  -Perform Hung assessment every shift  -Clean and moisturize skin every 2 hours and as needed  -Inspect skin when repositioning, toileting, and assisting with ADLS  -Assess under medical devices such as oxygen tubing every 2 hours  -Assess extremities for adequate circulation and sensation     Bed Management:  -Have minimal linens on bed & keep smooth, unwrinkled  -Change linens as needed when moist or perspiring  -Avoid sitting or lying in one position for more than 2 hours while in bed  -Keep HOB at 30degrees     Toileting:  -Offer bedside commode  -Assess for incontinence every 2 hours  -Use incontinent care products after each incontinent episode such as pads and external urinary catheter    Activity:  -Mobilize patient 2 times a day  -Encourage activity and walks on unit  -Encourage or provide ROM exercises   -Turn and reposition patient every 2 Hours  -Use appropriate equipment to lift or move patient in bed  -Instruct/ Assist with weight shifting every 2 hours when out of bed in chair  -Consider limitation of chair time 2 hour intervals    Skin Care:  -Avoid use of baby powder, tape, friction and shearing, hot water or constrictive clothing  -Relieve pressure over bony prominences using pillows  -Do not massage red bony areas      Outcome: Progressing  Goal: Incision(s), wounds(s) or drain site(s) healing without S/S of infection  Description: INTERVENTIONS  - Assess and document dressing, incision, wound bed, drain sites and surrounding tissue  - Provide patient and family education  - Perform skin care/dressing changes every day and as needed  Outcome: Progressing  Goal: Pressure injury heals and does not worsen  Description: Interventions:  - Implement low air loss mattress or specialty surface (Criteria met)  - Apply silicone foam dressing  - Instruct/assist with weight shifting every 120 minutes when in chair   - Limit chair time to 2 hour intervals  - Use special pressure reducing interventions such as EHOB  when in chair   - Apply fecal or urinary incontinence containment device   - Perform passive or active ROM every 2 hours  - Turn and reposition patient & offload bony prominences every 2 hours   - Utilize friction reducing device or surface for transfers   - Consider consults to  interdisciplinary teams such as PT  - Use incontinent care products after each incontinent episode such as pads  - Consider nutrition services referral as needed  Outcome: Progressing     Problem: MOBILITY - ADULT  Goal: Maintain or return to baseline ADL function  Description: INTERVENTIONS:  -  Assess patient's ability to carry out ADLs; assess patient's baseline for ADL function and identify physical deficits which impact ability to perform ADLs (bathing, care of mouth/teeth, toileting, grooming, dressing, etc.)  - Assess/evaluate cause of self-care deficits   - Assess range of motion  - Assess patient's mobility; develop plan if impaired  - Assess patient's need for assistive devices and provide as appropriate  - Encourage maximum independence but intervene and supervise when necessary  - Involve family in performance of ADLs  - Assess for home care needs following discharge   - Consider OT consult to assist with ADL evaluation and planning for discharge  - Provide patient education as appropriate  Outcome: Progressing  Goal: Maintains/Returns to pre admission functional level  Description: INTERVENTIONS:  - Perform BMAT or MOVE assessment daily.   - Set and communicate daily mobility goal to care team and patient/family/caregiver. - Collaborate with rehabilitation services on mobility goals if consulted  - Perform Range of Motion 2 times a day. - Reposition patient every 2 hours. - Dangle patient 2 times a day  - Stand patient 2 times a day  - Out of bed to chair 2 times a day   - Out of bed for meals 2 times a day  - Out of bed for toileting  - Record patient progress and toleration of activity level   Outcome: Progressing     Problem: Prexisting or High Potential for Compromised Skin Integrity  Goal: Skin integrity is maintained or improved  Description: INTERVENTIONS:  - Identify patients at risk for skin breakdown  - Assess and monitor skin integrity  - Assess and monitor nutrition and hydration status  - Monitor labs   - Assess for incontinence   - Turn and reposition patient  - Assist with mobility/ambulation  - Relieve pressure over bony prominences  - Avoid friction and shearing  - Provide appropriate hygiene as needed including keeping skin clean and dry  - Evaluate need for skin moisturizer/barrier cream  - Collaborate with interdisciplinary team   - Patient/family teaching  - Consider wound care consult   Outcome: Progressing     Problem: Nutrition/Hydration-ADULT  Goal: Nutrient/Hydration intake appropriate for improving, restoring or maintaining nutritional needs  Description: Monitor and assess patient's nutrition/hydration status for malnutrition.  Collaborate with interdisciplinary team and initiate plan and interventions as ordered. Monitor patient's weight and dietary intake as ordered or per policy. Utilize nutrition screening tool and intervene as necessary. Determine patient's food preferences and provide high-protein, high-caloric foods as appropriate.      INTERVENTIONS:  - Monitor oral intake, urinary output, labs, and treatment plans  - Assess nutrition and hydration status and recommend course of action  - Evaluate amount of meals eaten  - Assist patient with eating if necessary   - Allow adequate time for meals  - Recommend/ encourage appropriate diets, oral nutritional supplements, and vitamin/mineral supplements  - Order, calculate, and assess calorie counts as needed  -- Assess need for intravenous fluids  - Provide specific nutrition/hydration education as appropriate  - Include patient/family/caregiver in decisions related to nutrition  Outcome: Progressing

## 2023-08-19 NOTE — ASSESSMENT & PLAN NOTE
· Initially held metoprolol and Demadex  · Blood pressure improved, metoprolol resumed  · Patient appeared volume overloaded with +4 pitting edema bilateral lower extremities, received one-time dose of Lasix 20 mg IV on 8/12, 40 mg on 8/15 and 8/16 with improved creatinine  · Later Demadex and Lasix were discontinued as patient became hypotensive on 8/16/2023. Patient received IV fluids on 8/16/2023 which were later discontinued. · Blood pressure is much better. Metoprolol dose was increased to 50 mg p.o. twice daily. Trial of Lasix 40 mg IV today.

## 2023-08-19 NOTE — PROGRESS NOTES
[address]  Progress Note  Name: Krista Le  MRN: [id number]  Unit/Bed#: 913 [facility] 405-01 I Date of Admission: 8/11/2023   Date of Service: 8/19/2023 I Hospital Day: 8    Assessment/Plan       * Septic shock ([address])  Assessment & Plan  · As evidenced by fever, tachycardia, tachypnea, elevated white count  · Initially patient presumed to have pneumonia. Chest x-ray on 8/11/2023 concerning for right lower lobe infiltrate. CT chest on 8/14/2023 with bibasilar atelectasis. Repeat chest x-ray showed bibasilar ectasis versus infiltrates  · Patient initially had been placed on ceftriaxone, broadened antibiotics to cefepime on 8/15 secondary to fever spike. Patient had another fever spike with hypotensive and tachycardia on 8/16/2023 and patient received 30 mill per KG fluid bolus and was transferred to ICU  · CT chest abdomen pelvis rule out occult source of infection was ordered on 8/16/2023 which showed new trace bilateral pleural effusions with small pericardial effusion with bilateral lower lobe dependent atelectasis with 11 cm right breast loculated fluid collection with colonic diverticulosis  · Procalcitonin level is minimally elevated  · MRSA surveillance was positive  · Patient was later changed to cefepime and vancomycin. Cefepime was later discontinued due to concern for encephalopathy  · Blood cultures from admission from 8/11/2023 have been negative. Repeat blood cultures from 8/16/2023 have also been negative.   · Continue vancomycin with pharmacy consultation for dosing  · ID input appreciated      Toxic metabolic encephalopathy  Assessment & Plan  · Toxic metabolic encephalopathy secondary to sepsis/SEBASTIAN with contribution from cefepime  · On morning of 8/16 patient's mentation noted to be slowed, patient reported feeling foggy  · ABG performed pH 7.392, PCO2 45.9, PO2 74.6 and bicarb 26.0  · Patient also was hypotensive and received fluid bolus  · Patient later also had some expressive/receptive aphasia with some mild upper extremity weakness on 8/17/2023. Stroke alert was called and patient had a CAT scan of the brain which was negative for any acute findings. CTA could not be performed due to CKD. · MRI of the brain showed no acute infarction edema or pathological enhancement  · MRA head showed no large vessel occlusion or focal stenosis. · MRA of the carotids without any hemodynamically significant stenosis  · HemoGlobin A1c was 9.2  · Felt to be secondary to sepsis and acute kidney injury. Continue Eliquis and statin as per neurology. · Status has improved and is close to baseline. Breast abscess of female  Assessment & Plan  Patient noted to have edema with erythematous skin on the right lateral breast  CAT scan of the chest from 8/16/2023 with loculated fluid collection measuring 11 cm  IR was consulted-patient underwent drainage of 160 mL of purulent fluid from the right breast with placement of catheter. Postprocedure patient developed significant bleeding. Area was cauterized and thrombin gel injected to stop the bleeding and tube was upsized to a 12 [place] tube. Eliquis has been on hold. Acute kidney injury superimposed on chronic kidney disease [facility])  Assessment & Plan  Lab Results   Component Value Date    EGFR 31 08/19/2023    EGFR 26 08/18/2023    EGFR 19 08/17/2023    CREATININE 1.62 (H) 08/19/2023    CREATININE 1.88 (H) 08/18/2023    CREATININE 2.39 (H) 08/17/2023   · Suspected secondary to ATN in the setting of infection with hypotension and also possible contribution from cardio renal syndrome  · Creatinine baseline is 1.5-1.8  · Creatinine upon admission was 2.9 which has improved to 1.6  · Renal ultrasound showed no evidence of hydronephrosis  · Patient initially received few dose of IV Lasix which were later discontinued.    patient became hypotensive on 8/16/2023 following which  patient received gentle IV hydration which was later discontinued after blood pressure improved  · Trial of Lasix 40 mg IV today. ·   · Nephrology consulted  · Echo cardiogram shows EF 55%  · Monitor Is/Os    Essential hypertension  Assessment & Plan  · Initially held metoprolol and Demadex  · Blood pressure improved, metoprolol resumed  · Patient appeared volume overloaded with +4 pitting edema bilateral lower extremities, received one-time dose of Lasix 20 mg IV on 8/12, 40 mg on 8/15 and 8/16 with improved creatinine  · Later Demadex and Lasix were discontinued as patient became hypotensive on 8/16/2023. Patient received IV fluids on 8/16/2023 which were later discontinued. · Blood pressure is much better. Metoprolol dose was increased to 50 mg p.o. twice daily. Trial of Lasix 40 mg IV today. Paroxysmal atrial fibrillation (HCC)  Assessment & Plan  · Continue Eliquis  · Continue metoprolol  · Patient had couple of episodes of atrial fibrillation with rapid ventricular late last night needing as needed IV Lopressor  · Will increase metoprolol to 50 mg p.o. twice daily.   Heart rate currently controlled    Morbid obesity with BMI of 45.0-49.9, adult [facility])  Assessment & Plan  · Dietary lifestyle modifications    Ductal carcinoma in situ (DCIS) of right breast  Assessment & Plan  · Diagnosed in March 2023  · Status postlumpectomy on 5/30/2023  · Radiation was to start 8/16 at [facility]  · Outpatient follow-up with oncology    Type 2 diabetes mellitus with hyperglycemia, without long-term current use of insulin [facility])  Assessment & Plan  Lab Results   Component Value Date    HGBA1C 9.2 (H) 08/18/2023       Recent Labs     08/18/23  2048 08/19/23  0201 08/19/23  0641 08/19/23  1102   POCGLU 97 122 148* 184*       Blood Sugar Average: Last 72 hrs:  (P) [id number]   · Continue 25 units of lantus insulin at bedtime, 5 units of Humalog 3 times daily with meals and insulin sliding scale  · Patient is on diabetic diet    Chronic respiratory failure with hypoxia and hypercapnia ([address])  Assessment & Plan  · Patient wears oxygen at home at night with her CPAP but is not oxygen dependent during the day  · Keep O2 sats greater than 92%  · Currently stable on 2 L with O2 sats mid 90s. · Continue CPAP nightly  · Encourage incentive spirometry and pulmonary toilet    KATRINA (obstructive sleep apnea)  Assessment & Plan  · Continue CPAP 7 cm water hs    Mixed hyperlipidemia  Assessment & Plan  Continue Lipitor             VTE Pharmacologic Prophylaxis: VTE Score: 5 High Risk (Score >/= 5) - Pharmacological DVT Prophylaxis Contraindicated. Sequential Compression Devices Ordered. Patient Centered Rounds: I performed bedside rounds with nursing staff today. Discussions with Specialists or Other Care Team Provider: No    Education and Discussions with Family / Patient: Updated  (father and sister) via phone. Total Time Spent on Date of Encounter in care of patient: 55 minutes This time was spent on one or more of the following: performing physical exam; counseling and coordination of care; obtaining or reviewing history; documenting in the medical record; reviewing/ordering tests, medications or procedures; communicating with other healthcare professionals and discussing with patient's family/caregivers. Current Length of Stay: 8 day(s)  Current Patient Status: Inpatient   Certification Statement: The patient will continue to require additional inpatient hospital stay due to Septic shock with right breast abscess, acute kidney injury, constipation, chronic respiratory failure  Discharge Plan: Anticipate discharge in 48-72 hrs to Pending course    Code Status: Level 1 - Full Code    Subjective:   Patient is more awake and alert and responding appropriately. Reported some abdominal discomfort and did not have a bowel movement in 3 days.   Patient does report some nausea    Objective:     Vitals:   Temp (24hrs), Av.3 °F (36.3 °C), Min:96.8 °F (36 °C), Max:97.8 °F (36.6 °C)    Temp:  [96.8 °F (36 °C)-97.8 °F (36.6 °C)] 97.8 °F (36.6 °C)  HR:  [] 77  Resp:  [23-28] 23  BP: (120-165)/(57-95) 122/57  SpO2:  [92 %-96 %] 96 %  Body mass index is 48.23 kg/m². Input and Output Summary (last 24 hours): Intake/Output Summary (Last 24 hours) at 8/19/2023 1630  Last data filed at 8/19/2023 0834  Gross per 24 hour   Intake 70 ml   Output 520 ml   Net -450 ml       Physical Exam:   Physical Exam  Constitutional:       Appearance: Normal appearance. HENT:      Head: Normocephalic and atraumatic. Nose: Nose normal.      Mouth/Throat:      Mouth: Mucous membranes are moist.      Pharynx: Oropharynx is clear. Eyes:      Extraocular Movements: Extraocular movements intact. Pupils: Pupils are equal, round, and reactive to light. Cardiovascular:      Rate and Rhythm: Normal rate. Rhythm irregular. Pulmonary:      Effort: Pulmonary effort is normal.      Breath sounds: Normal breath sounds. Abdominal:      General: Bowel sounds are normal. There is no distension. Palpations: Abdomen is soft. Tenderness: There is no abdominal tenderness. Musculoskeletal:         General: No swelling. Cervical back: Normal range of motion and neck supple. Right lower leg: Edema present. Left lower leg: Edema present. Skin:     General: Skin is warm and dry. Neurological:      General: No focal deficit present. Mental Status: She is alert.          Additional Data:     Labs:  Results from last 7 days   Lab Units 08/18/23  1219 08/18/23  0543   WBC Thousand/uL 14.95* 14.96*   HEMOGLOBIN g/dL 10.3* 10.5*   HEMATOCRIT % 33.5* 34.3*   PLATELETS Thousands/uL 250 260   NEUTROS PCT %  --  76*   LYMPHS PCT %  --  9*   MONOS PCT %  --  11   EOS PCT %  --  2     Results from last 7 days   Lab Units 08/19/23  0457 08/17/23  0809 08/16/23  1322   SODIUM mmol/L 147   < > 139   POTASSIUM mmol/L 4.3   < > 4.7   CHLORIDE mmol/L 117*   < > 103   CO2 mmol/L 24   < > 28 BUN mg/dL 53*   < > 67*   CREATININE mg/dL 1.62*   < > 2.71*   ANION GAP mmol/L 6   < > 8   CALCIUM mg/dL 8.2*   < > 8.8   ALBUMIN g/dL  --   --  3.2*   TOTAL BILIRUBIN mg/dL  --   --  0.41   ALK PHOS U/L  --   --  43   ALT U/L  --   --  8   AST U/L  --   --  9*   GLUCOSE RANDOM mg/dL 121   < > 177*    < > = values in this interval not displayed. Results from last 7 days   Lab Units 08/19/23  1102 08/19/23  0641 08/19/23  0201 08/18/23  2048 08/18/23  1632 08/18/23  1128 08/18/23  0725 08/18/23  0138 08/17/23  2036 08/17/23  1548 08/17/23  1108 08/17/23  0813   POC GLUCOSE mg/dl 184* 148* 122 97 97 173* 201* 198* 205* 221* 184* 140     Results from last 7 days   Lab Units 08/18/23  0543 08/13/23  0446   HEMOGLOBIN A1C % 9.2* 9.3*     Results from last 7 days   Lab Units 08/18/23  0543 08/17/23  0547 08/16/23  1322 08/15/23  0432 08/14/23  0559 08/13/23  1956   LACTIC ACID mmol/L  --   --  0.7  --   --  0.6   PROCALCITONIN ng/ml 0.40* 0.56* 0.62* 0.36* 0.42*  --        Lines/Drains:  Invasive Devices     Peripheral Intravenous Line  Duration           Peripheral IV 08/16/23 Distal;Right;Ventral (anterior) Forearm 3 days    Peripheral IV 08/18/23 Left;Ventral (anterior) Forearm <1 day          Drain  Duration           External Urinary Catheter 2 days    Abscess Drain Breast <1 day                  Telemetry:  Telemetry Orders (From admission, onward)             24 Hour Telemetry Monitoring  Continuous x 24 Hours (Telem)        Question:  Reason for 24 Hour Telemetry  Answer:  Arrhythmias requiring acute medical intervention / PPM or ICD malfunction                 Telemetry Reviewed: Atrial fibrillation.  HR averaging From 80-1 40  Indication for Continued Telemetry Use: Arrthymias requiring medical therapy             Imaging: Reviewed radiology reports from this admission including: chest CT scan, abdominal/pelvic CT, CT head and MRI brain    Recent Cultures (last 7 days):   Results from last 7 days   Lab Units 08/18/23  1424 08/16/23  1349 08/16/23  1322 08/13/23  1956   BLOOD CULTURE   --  No Growth at 72 hrs. No Growth at 72 hrs. No Growth After 5 Days. No Growth After 5 Days.    GRAM STAIN RESULT  1+ Polys  No bacteria seen  --   --   --    BODY FLUID CULTURE, STERILE  No growth  --   --   --        Last 24 Hours Medication List:   Current Facility-Administered Medications   Medication Dose Route Frequency Provider Last Rate   • acetaminophen  650 mg Oral Q6H PRN Sumit Ny MD     • ammonium lactate  1 Application Topical BID PRN Sumit Ny MD     • aspirin  81 mg Oral Daily Sumit Ny MD     • atorvastatin  40 mg Oral Daily With Mark Rockwell MD     • bisacodyl  10 mg Rectal Daily PRN Sumit Ny MD     • fluticasone  1 spray Nasal Daily Sumit Ny MD     • Fluticasone Furoate-Vilanterol  1 puff Inhalation Daily Sumit Ny MD     • insulin glargine  25 Units Subcutaneous HS Sumit Ny MD     • insulin lispro  1-5 Units Subcutaneous TID AC Sumit Ny MD     • insulin lispro  1-5 Units Subcutaneous HS Sumit Ny MD     • insulin lispro  1-5 Units Subcutaneous 0200 Georgi Dye MD     • insulin lispro  5 Units Subcutaneous TID With Meals Sumit Ny MD     • metoprolol  5 mg Intravenous Q6H PRN Sumit Ny MD     • metoprolol tartrate  50 mg Oral Q12H [facility] Sumit Ny MD     • montelukast  10 mg Oral HS Sumit Ny MD     • nystatin   Topical BID Sumit Ny MD     • ondansetron  4 mg Intravenous Q6H PRN Sumit Ny MD     • pantoprazole  40 mg Oral Daily Sumit Ny MD     • polyethylene glycol  17 g Oral Daily Sumit Ny MD     • saccharomyces boulardii  250 mg Oral BID Sumit Ny MD     • vancomycin  15 mg/kg (Adjusted) Intravenous Daily PRN Sumit Ny MD          Today, Patient Was Seen By: Sumit Ny MD    **Please Note: This note may have been constructed using a voice recognition system. **

## 2023-08-19 NOTE — ASSESSMENT & PLAN NOTE
· Patient wears oxygen at home at night with her CPAP but is not oxygen dependent during the day  · Keep O2 sats greater than 92%  · Currently stable on 2 L with O2 sats mid 90s.   · Continue CPAP nightly  · Encourage incentive spirometry and pulmonary toilet

## 2023-08-19 NOTE — ASSESSMENT & PLAN NOTE
· Diagnosed in March 2023  · Status postlumpectomy on 5/30/2023  · Radiation was to start 8/16 at [facility]  · Outpatient follow-up with oncology

## 2023-08-19 NOTE — ASSESSMENT & PLAN NOTE
· Continue Eliquis  · Continue metoprolol  · Patient had couple of episodes of atrial fibrillation with rapid ventricular late last night needing as needed IV Lopressor  · Will increase metoprolol to 50 mg p.o. twice daily.   Heart rate currently controlled

## 2023-08-19 NOTE — PROGRESS NOTES
[address] NOTE   Elinor Lou 72 y.o. female MRN: [id number]  Unit/Bed#: ICU 01 Encounter: [id number]  Reason for Consult: SEBASTIAN on CKD    ASSESSMENT and PLAN:  81-year-old female with history of CKD admitted with sepsis secondary to pneumonia. We are consulted for management of SEBASTIAN on CKD. 1. Acute kidney injury. Etiology of acute kidney injury most likely ATN in setting of infection +/- episodes of hypotension and contribution of cardiorenal syndrome. Etiology of CKD most likely hypertension, diabetes, cardiorenal syndrome. Creatinine at baseline is 1.5-1.8, creatinine at presentation 1.64, peak creatinine 2.9 on 08/15, creatinine trending down to 1.6 today. Urinalysis showed trace protein, 0-1 RBC, 4-10 WBC. UACR 55 mg/g 03/2023. Kidney ultrasound did not show hydronephrosis. She was given a trial of IV Lasix on 08/15 and 08/16 which led to improvement in her serum creatinine. However subsequently she had a drop in her blood pressure on 08/16 and diuretics were discontinued. She was given IV fluids on 08/16. Since then IV fluids have been on hold. She has signs of volume overload currently and will give IV Lasix 40 mg x 1 today. Trend BMP. No indication for renal replacement therapy. 2. Hypertension/volume. She has history of diastolic CHF and follows with heart failure service. Home medications include Toprol-XL 50 mg twice daily and torsemide 40 mg daily. CT chest on admission consistent with mild septal thickening suggestive of interstitial edema. Cardiac BNP was also elevated at 213. Chest x-ray 08/16 did not show any evidence of pulmonary edema and is probably improved after administration of diuretics since admission. She was given diuretic holiday 08/17 in 08/18. She now has some signs of volume overload as evidenced by ongoing hypoxia and lower extremity swelling. We will give IV Lasix 40 mg x 1 today. 3. Anemia of CKD. Baseline hemoglobin is around 10-11. Currently hemoglobin 10.3. Serum ferritin 168, iron saturation 5 consistent with functional iron deficiency. Hold off iron supplementation in setting of infection. We will consider intravenous iron once she recovers from infection. No indication for intrapartum stimulating agents as hemoglobin is above 10.      4. Hyperkalemia. This is currently resolved. 5. Bone mineral disease. 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 Ionized calcium was low 08/15 and she was given IV calcium gluconate due to intermittent twitching. Calcium level is currently normalized and twitching is resolved. 6.  Right breast abscess. Status post IR drain placement. Currently on IV vancomycin per level. Infectious diseases following. 7. New onset twitching 08/16. This was most likely due to combination of hypocalcemia and high-dose of pregabalin in setting of SEBASTIAN. Twitching is currently resolved after intravenous calcium supplementation and holding pregabalin. 8.  Change in mental status. This is currently improved. Stroke alert was called 08/17 and she underwent CT head, MRI brain and MRA of head and neck that have been unrevealing at this time. Cefepime being considered as possibility of altered sensorium. Cefepime has been discontinued and mental status has improved. Discussed with internal medicine team.  After discussion, we agreed that kidney function continues to improve but patient with signs of volume overload and will give IV Lasix 40 mg x 1 today. .    SUBJECTIVE / 24H INTERVAL HISTORY:  Urine output recorded as 1350 cc. She had right breast abscess drained yesterday. This morning denies dyspnea but appears to have mild respiratory distress. Also has significant leg swelling. Review of Systems   Constitutional: Negative for chills and fever. HENT: Negative for ear pain and sore throat. Eyes: Negative for pain and visual disturbance.    Respiratory: Negative for cough and shortness of breath. Cardiovascular: Negative for chest pain and palpitations. Gastrointestinal: Negative for abdominal pain and vomiting. Genitourinary: Negative for dysuria and hematuria. Musculoskeletal: Negative for arthralgias and back pain. Skin: Negative for color change and rash. Neurological: Negative for seizures and syncope. All other systems reviewed and are negative. OBJECTIVE:  Current Weight: Weight - Scale: 120 kg (263 lb 10.7 oz)  Vitals:    08/19/23 0400 08/19/23 0553 08/19/23 0640 08/19/23 0834   BP: 161/69  156/95 159/92   BP Location:       Pulse: (!) 124  (!) 132 (!) 135   Resp: (!) 28  (!) 24    Temp: 97.8 °F (36.6 °C)      TempSrc: Temporal      SpO2: 93%  94%    Weight:  120 kg (263 lb 10.7 oz)     Height:           Intake/Output Summary (Last 24 hours) at 8/19/2023 0943  Last data filed at 8/19/2023 0834  Gross per 24 hour   Intake 320 ml   Output 1530 ml   Net -1210 ml     Physical Exam  Vitals and nursing note reviewed. Constitutional:       General: She is not in acute distress. Appearance: She is well-developed. HENT:      Head: Normocephalic and atraumatic. Eyes:      Conjunctiva/sclera: Conjunctivae normal.   Cardiovascular:      Rate and Rhythm: Normal rate. Rhythm irregular. Heart sounds: No murmur heard. Pulmonary:      Effort: Pulmonary effort is normal. No respiratory distress. Comments: Decreased breath sounds bilaterally at bases  Abdominal:      Palpations: Abdomen is soft. Tenderness: There is no abdominal tenderness. Musculoskeletal:         General: No swelling. Cervical back: Neck supple. Right lower leg: Edema present. Left lower leg: Edema present. Skin:     General: Skin is warm and dry. Capillary Refill: Capillary refill takes less than 2 seconds. Neurological:      Mental Status: She is alert.    Psychiatric:         Mood and Affect: Mood normal.       Medications:    Current Facility-Administered Medications:   •  acetaminophen (TYLENOL) tablet 650 mg, 650 mg, Oral, Q6H PRN, [employer], RK, 650 mg at 08/19/23 1256  •  ammonium lactate (LAC-HYDRIN) 12 % lotion 1 Application, 1 Application, Topical, BID PRN, [employer], CRNP, 1 Application at 65/27/50 0496  •  aspirin chewable tablet 81 mg, 81 mg, Oral, Daily, [employer], CRNP, 81 mg at 08/19/23 9964  •  atorvastatin (LIPITOR) tablet 40 mg, 40 mg, Oral, Daily With RK Prieto  •  fluticasone (FLONASE) 50 mcg/act nasal spray 1 spray, 1 spray, Nasal, Daily, [employer], CRNP, 1 spray at 08/19/23 9694  •  Fluticasone Furoate-Vilanterol 100-25 mcg/actuation 1 puff, 1 puff, Inhalation, Daily, [employer], CRNP, 1 puff at 08/19/23 4858  •  insulin glargine (LANTUS) subcutaneous injection 25 Units 0.25 mL, 25 Units, Subcutaneous, HS, RK Clements  •  insulin lispro (HumaLOG) 100 units/mL subcutaneous injection 1-5 Units, 1-5 Units, Subcutaneous, TID AC **AND** Fingerstick Glucose (POCT), , , TID AC, RK Clements  •  insulin lispro (HumaLOG) 100 units/mL subcutaneous injection 1-5 Units, 1-5 Units, Subcutaneous, HS, RK Clements  •  insulin lispro (HumaLOG) 100 units/mL subcutaneous injection 1-5 Units, 1-5 Units, Subcutaneous, 0200, RK Clements  •  insulin lispro (HumaLOG) 100 units/mL subcutaneous injection 5 Units, 5 Units, Subcutaneous, TID With Meals, RK Clements, 5 Units at 08/19/23 0815  •  metoprolol (LOPRESSOR) injection 5 mg, 5 mg, Intravenous, Q6H PRN, [employer], RK, 5 mg at 08/19/23 4623  •  metoprolol tartrate (LOPRESSOR) tablet 25 mg, 25 mg, Oral, Q12H SELMA, [employer], CRNP, 25 mg at 08/19/23 2426  •  montelukast (SINGULAIR) tablet 10 mg, 10 mg, Oral, HS, [employer], CRNP, 10 mg at 08/18/23 2152  •  nystatin (MYCOSTATIN) powder, , Topical, BID, [employer], CRNP, 1 Application at 56/28/06 6300  •  ondansetron (ZOFRAN) injection 4 mg, 4 mg, Intravenous, Q6H PRN, [employer] [facility], CRNP, 4 mg at 08/19/23 9545  •  pantoprazole (PROTONIX) EC tablet 40 mg, 40 mg, Oral, Daily, [employer] [facility], CRNP, 40 mg at 08/19/23 4701  •  saccharomyces boulardii (FLORASTOR) capsule 250 mg, 250 mg, Oral, BID, Baker Marie [facility], CRNP, 250 mg at 08/19/23 4767  •  vancomycin (VANCOCIN) 1,250 mg in sodium chloride 0.9 % 250 mL IVPB, 15 mg/kg (Adjusted), Intravenous, Daily PRN, [employer], CRNP    Laboratory Results:  Results from last 7 days   Lab Units 08/19/23  0457 08/18/23  1219 08/18/23  0543 08/17/23  0809 08/17/23  0547 08/16/23  1322 08/16/23  0455 08/15/23  0432 08/14/23  0559   WBC Thousand/uL  --  14.95* 14.96*  --  14.72* 13.31* 16.06* 11.61* 14.02*   HEMOGLOBIN g/dL  --  10.3* 10.5*  --  10.1* 10.0* 10.3* 9.1* 9.6*   HEMATOCRIT %  --  33.5* 34.3*  --  32.1* 31.4* 32.6* 30.2* 31.8*   PLATELETS Thousands/uL  --  250 260  --  184 185 173 143* 142*   POTASSIUM mmol/L 4.3  --  4.8 4.9  --  4.7 5.5* 4.6 4.5   CHLORIDE mmol/L 117*  --  109* 106  --  103 104 102 101   CO2 mmol/L 24  --  26 26  --  28 28 28 27   BUN mg/dL 53*  --  58* 65*  --  67* 65* 58* 47*   CREATININE mg/dL 1.62*  --  1.88* 2.39*  --  2.71* 2.70* 2.91* 2.69*   CALCIUM mg/dL 8.2*  --  9.3 8.6  --  8.8 8.5 7.9* 8.2*   MAGNESIUM mg/dL 2.2  --  2.3 2.0  --   --   --   --   --    PHOSPHORUS mg/dL 3.3  --   --  4.4*  --   --   --   --   --      Portions of the record may have been created with voice recognition software. Occasional wrong word or "sound a like" substitutions may have occurred due to the inherent limitations of voice recognition software. Read the chart carefully and recognize, using context, where substitutions have occurred. If you have any questions, please contact the dictating provider.

## 2023-08-19 NOTE — ASSESSMENT & PLAN NOTE
Patient noted to have edema with erythematous skin on the right lateral breast  CAT scan of the chest from 8/16/2023 with loculated fluid collection measuring 11 cm  IR was consulted-patient underwent drainage of 160 mL of purulent fluid from the right breast with placement of catheter. Postprocedure patient developed significant bleeding. Area was cauterized and thrombin gel injected to stop the bleeding and tube was upsized to a 12 [place] tube. Eliquis has been on hold.

## 2023-08-19 NOTE — PROGRESS NOTES
Mateus Smith is a 72 y.o. female who is currently ordered Vancomycin IV with management by the Pharmacy Consult service. Relevant clinical data and objective / subjective history reviewed. Vancomycin Assessment:  Indication and Goal AUC/Trough: Pneumonia (goal -600, trough >10), -600, trough >10  Clinical Status:   Micro:   MRSA+  Renal Function:  SCr: 1.62 mg/dL  CrCl: 39.2 mL/min  Renal replacement: Not on dialysis  Days of Therapy: 4  Current Dose: 1250 mg IV prn trough < 15  Vancomycin Plan:  Continue the pulse dosing for today: Dose will NOT be given today 8/19/23. As the renal fxn has been improved, if stable, the scheduled dosing will be utilized. Estimated AUC: 400-600 mcg*hr/mL  Estimated Trough: >10 mcg/mL  Next Level: 8/20/23 @0600  Renal Function Monitoring: Daily BMP and [place] will continue to follow closely for s/sx of nephrotoxicity, infusion reactions and appropriateness of therapy. BMP and CBC will be ordered per protocol. We will continue to follow the patient’s culture results and clinical progress daily.     Juanjo Renae, Pharmacist

## 2023-08-19 NOTE — ASSESSMENT & PLAN NOTE
Patient did not have a bowel movement in 3 days and complaining of some abdominal discomfort and nausea   patient started MiraLAX 17 g p.o. daily and was given Keflex suppository without bowel movement  Add senna 2 tablets daily nightly

## 2023-08-19 NOTE — ASSESSMENT & PLAN NOTE
· As evidenced by fever, tachycardia, tachypnea, elevated white count  · Initially patient presumed to have pneumonia. Chest x-ray on 8/11/2023 concerning for right lower lobe infiltrate. CT chest on 8/14/2023 with bibasilar atelectasis. Repeat chest x-ray showed bibasilar ectasis versus infiltrates  · Patient initially had been placed on ceftriaxone, broadened antibiotics to cefepime on 8/15 secondary to fever spike. Patient had another fever spike with hypotensive and tachycardia on 8/16/2023 and patient received 30 mill per KG fluid bolus and was transferred to ICU  · CT chest abdomen pelvis rule out occult source of infection was ordered on 8/16/2023 which showed new trace bilateral pleural effusions with small pericardial effusion with bilateral lower lobe dependent atelectasis with 11 cm right breast loculated fluid collection with colonic diverticulosis  · Procalcitonin level is minimally elevated  · MRSA surveillance was positive  · Patient was later changed to cefepime and vancomycin. Cefepime was later discontinued due to concern for encephalopathy  · Blood cultures from admission from 8/11/2023 have been negative. Repeat blood cultures from 8/16/2023 have also been negative.   · Continue vancomycin with pharmacy consultation for dosing  · ID input appreciated

## 2023-08-19 NOTE — ASSESSMENT & PLAN NOTE
· Toxic metabolic encephalopathy secondary to sepsis/SEBASTIAN with contribution from cefepime  · On morning of 8/16 patient's mentation noted to be slowed, patient reported feeling foggy  · ABG performed pH 7.392, PCO2 45.9, PO2 74.6 and bicarb 26.0  · Patient also was hypotensive and received fluid bolus  · Patient later also had some expressive/receptive aphasia with some mild upper extremity weakness on 8/17/2023. Stroke alert was called and patient had a CAT scan of the brain which was negative for any acute findings. CTA could not be performed due to CKD. · MRI of the brain showed no acute infarction edema or pathological enhancement  · MRA head showed no large vessel occlusion or focal stenosis. · MRA of the carotids without any hemodynamically significant stenosis  · HemoGlobin A1c was 9.2  · Felt to be secondary to sepsis and acute kidney injury. Continue Eliquis and statin as per neurology. · Status has improved and is close to baseline.

## 2023-08-19 NOTE — ASSESSMENT & PLAN NOTE
Lab Results   Component Value Date    HGBA1C 9.2 (H) 08/18/2023       Recent Labs     08/18/23  2048 08/19/23  0201 08/19/23  0641 08/19/23  1102   POCGLU 97 122 148* 184*       Blood Sugar Average: Last 72 hrs:  (P) [id number]   · Continue 25 units of lantus insulin at bedtime, 5 units of Humalog 3 times daily with meals and insulin sliding scale  · Patient is on diabetic diet

## 2023-08-19 NOTE — ASSESSMENT & PLAN NOTE
Lab Results   Component Value Date    EGFR 31 08/19/2023    EGFR 26 08/18/2023    EGFR 19 08/17/2023    CREATININE 1.62 (H) 08/19/2023    CREATININE 1.88 (H) 08/18/2023    CREATININE 2.39 (H) 08/17/2023   · Suspected secondary to ATN in the setting of infection with hypotension and also possible contribution from cardio renal syndrome  · Creatinine baseline is 1.5-1.8  · Creatinine upon admission was 2.9 which has improved to 1.6  · Renal ultrasound showed no evidence of hydronephrosis  · Patient initially received few dose of IV Lasix which were later discontinued and patient received IV fluids and patient became hypotensive on 8/16/2023 following which IV fluids have been on hold  · Trial of Lasix 40 mg IV today. · Nephrology has been following; did receive one dose of IV Lasix morning of 8/16, discontinued as patient became hypotensive as noted above.   ·   · Check BNP in am  · Nephrology consulted  · Started on IV lasix 40 mg BID; currently on hold secondary to hypotension/sepsis  · Echo cardiogram shows EF 55%  · Monitor Is/Os

## 2023-08-20 LAB
ANION GAP SERPL CALCULATED.3IONS-SCNC: 5 MMOL/L
BASOPHILS # BLD AUTO: 0.13 THOUSANDS/ÂΜL (ref 0–0.1)
BASOPHILS NFR BLD AUTO: 1 % (ref 0–1)
BUN SERPL-MCNC: 67 MG/DL (ref 5–25)
CALCIUM SERPL-MCNC: 9 MG/DL (ref 8.4–10.2)
CHLORIDE SERPL-SCNC: 112 MMOL/L (ref 96–108)
CO2 SERPL-SCNC: 29 MMOL/L (ref 21–32)
CREAT SERPL-MCNC: 2.22 MG/DL (ref 0.6–1.3)
EOSINOPHIL # BLD AUTO: 0.4 THOUSAND/ÂΜL (ref 0–0.61)
EOSINOPHIL NFR BLD AUTO: 4 % (ref 0–6)
ERYTHROCYTE [DISTWIDTH] IN BLOOD BY AUTOMATED COUNT: 16.1 % (ref 11.6–15.1)
GFR SERPL CREATININE-BSD FRML MDRD: 21 ML/MIN/1.73SQ M
GLUCOSE SERPL-MCNC: 129 MG/DL (ref 65–140)
GLUCOSE SERPL-MCNC: 147 MG/DL (ref 65–140)
GLUCOSE SERPL-MCNC: 158 MG/DL (ref 65–140)
GLUCOSE SERPL-MCNC: 208 MG/DL (ref 65–140)
GLUCOSE SERPL-MCNC: 216 MG/DL (ref 65–140)
GLUCOSE SERPL-MCNC: 241 MG/DL (ref 65–140)
HCT VFR BLD AUTO: 26.9 % (ref 34.8–46.1)
HCT VFR BLD AUTO: 28.4 % (ref 34.8–46.1)
HGB BLD-MCNC: 8 G/DL (ref 11.5–15.4)
HGB BLD-MCNC: 8.1 G/DL (ref 11.5–15.4)
IMM GRANULOCYTES # BLD AUTO: 0.17 THOUSAND/UL (ref 0–0.2)
IMM GRANULOCYTES NFR BLD AUTO: 2 % (ref 0–2)
LYMPHOCYTES # BLD AUTO: 1.91 THOUSANDS/ÂΜL (ref 0.6–4.47)
LYMPHOCYTES NFR BLD AUTO: 17 % (ref 14–44)
MCH RBC QN AUTO: 27.6 PG (ref 26.8–34.3)
MCHC RBC AUTO-ENTMCNC: 28.5 G/DL (ref 31.4–37.4)
MCV RBC AUTO: 97 FL (ref 82–98)
MONOCYTES # BLD AUTO: 1.25 THOUSAND/ÂΜL (ref 0.17–1.22)
MONOCYTES NFR BLD AUTO: 11 % (ref 4–12)
NEUTROPHILS # BLD AUTO: 7.1 THOUSANDS/ÂΜL (ref 1.85–7.62)
NEUTS SEG NFR BLD AUTO: 65 % (ref 43–75)
NRBC BLD AUTO-RTO: 0 /100 WBCS
PLATELET # BLD AUTO: 295 THOUSANDS/UL (ref 149–390)
PMV BLD AUTO: 10.6 FL (ref 8.9–12.7)
POTASSIUM SERPL-SCNC: 5.4 MMOL/L (ref 3.5–5.3)
RBC # BLD AUTO: 2.94 MILLION/UL (ref 3.81–5.12)
SODIUM SERPL-SCNC: 146 MMOL/L (ref 135–147)
VANCOMYCIN SERPL-MCNC: 14.7 UG/ML (ref 10–20)
WBC # BLD AUTO: 10.96 THOUSAND/UL (ref 4.31–10.16)

## 2023-08-20 PROCEDURE — 99232 SBSQ HOSP IP/OBS MODERATE 35: CPT | Performed by: INTERNAL MEDICINE

## 2023-08-20 PROCEDURE — 94760 N-INVAS EAR/PLS OXIMETRY 1: CPT

## 2023-08-20 PROCEDURE — 97110 THERAPEUTIC EXERCISES: CPT

## 2023-08-20 PROCEDURE — 82948 REAGENT STRIP/BLOOD GLUCOSE: CPT

## 2023-08-20 PROCEDURE — 80202 ASSAY OF VANCOMYCIN: CPT | Performed by: INTERNAL MEDICINE

## 2023-08-20 PROCEDURE — 85018 HEMOGLOBIN: CPT | Performed by: INTERNAL MEDICINE

## 2023-08-20 PROCEDURE — 85014 HEMATOCRIT: CPT | Performed by: INTERNAL MEDICINE

## 2023-08-20 PROCEDURE — 97530 THERAPEUTIC ACTIVITIES: CPT

## 2023-08-20 PROCEDURE — 80048 BASIC METABOLIC PNL TOTAL CA: CPT | Performed by: INTERNAL MEDICINE

## 2023-08-20 PROCEDURE — 85025 COMPLETE CBC W/AUTO DIFF WBC: CPT | Performed by: INTERNAL MEDICINE

## 2023-08-20 RX ORDER — VANCOMYCIN HYDROCHLORIDE 1 G/200ML
12.5 INJECTION, SOLUTION INTRAVENOUS ONCE
Status: COMPLETED | OUTPATIENT
Start: 2023-08-20 | End: 2023-08-20

## 2023-08-20 RX ORDER — VANCOMYCIN HYDROCHLORIDE 1 G/200ML
12.5 INJECTION, SOLUTION INTRAVENOUS DAILY PRN
Status: DISCONTINUED | OUTPATIENT
Start: 2023-08-20 | End: 2023-08-22 | Stop reason: HOSPADM

## 2023-08-20 RX ADMIN — INSULIN LISPRO 1 UNITS: 100 INJECTION, SOLUTION INTRAVENOUS; SUBCUTANEOUS at 12:29

## 2023-08-20 RX ADMIN — INSULIN LISPRO 1 UNITS: 100 INJECTION, SOLUTION INTRAVENOUS; SUBCUTANEOUS at 22:25

## 2023-08-20 RX ADMIN — ATORVASTATIN CALCIUM 40 MG: 40 TABLET, FILM COATED ORAL at 17:00

## 2023-08-20 RX ADMIN — METOPROLOL TARTRATE 50 MG: 50 TABLET, FILM COATED ORAL at 20:54

## 2023-08-20 RX ADMIN — ASPIRIN 81 MG CHEWABLE TABLET 81 MG: 81 TABLET CHEWABLE at 08:59

## 2023-08-20 RX ADMIN — INSULIN GLARGINE 25 UNITS: 100 INJECTION, SOLUTION SUBCUTANEOUS at 22:24

## 2023-08-20 RX ADMIN — FLUTICASONE PROPIONATE 1 SPRAY: 50 SPRAY, METERED NASAL at 09:00

## 2023-08-20 RX ADMIN — SODIUM ZIRCONIUM CYCLOSILICATE 10 G: 10 POWDER, FOR SUSPENSION ORAL at 10:34

## 2023-08-20 RX ADMIN — SENNOSIDES 17.2 MG: 8.6 TABLET, FILM COATED ORAL at 22:24

## 2023-08-20 RX ADMIN — INSULIN LISPRO 1 UNITS: 100 INJECTION, SOLUTION INTRAVENOUS; SUBCUTANEOUS at 02:19

## 2023-08-20 RX ADMIN — INSULIN LISPRO 2 UNITS: 100 INJECTION, SOLUTION INTRAVENOUS; SUBCUTANEOUS at 17:02

## 2023-08-20 RX ADMIN — MONTELUKAST 10 MG: 10 TABLET, FILM COATED ORAL at 22:24

## 2023-08-20 RX ADMIN — NYSTATIN: 100000 POWDER TOPICAL at 17:07

## 2023-08-20 RX ADMIN — Medication 250 MG: at 08:59

## 2023-08-20 RX ADMIN — FLUTICASONE FUROATE AND VILANTEROL TRIFENATATE 1 PUFF: 100; 25 POWDER RESPIRATORY (INHALATION) at 09:00

## 2023-08-20 RX ADMIN — Medication 250 MG: at 17:00

## 2023-08-20 RX ADMIN — ACETAMINOPHEN 650 MG: 325 TABLET ORAL at 13:48

## 2023-08-20 RX ADMIN — PANTOPRAZOLE SODIUM 40 MG: 40 TABLET, DELAYED RELEASE ORAL at 08:59

## 2023-08-20 RX ADMIN — METOPROLOL TARTRATE 50 MG: 50 TABLET, FILM COATED ORAL at 08:59

## 2023-08-20 RX ADMIN — VANCOMYCIN HYDROCHLORIDE 1000 MG: 1 INJECTION, SOLUTION INTRAVENOUS at 11:08

## 2023-08-20 NOTE — PHYSICAL THERAPY NOTE
PT TREATMENT       08/20/23 1458   Note Type   Note Type Treatment   Pain Assessment   Pain Assessment Tool 0-10   Pain Score No Pain   Patient's Stated Pain Goal No pain   Multiple Pain Sites No   Restrictions/Precautions   Weight Bearing Precautions Per Order No   Other Precautions Bed Alarm; Chair Alarm;Contact/isolation; Fall Risk;Pain;Cognitive   General   Chart Reviewed Yes   Family/Caregiver Present Yes  (dtr at bedside)   Cognition   Overall Cognitive Status Impaired   Arousal/Participation Cooperative   Orientation Level Oriented to person;Oriented to place; Disoriented to time;Disoriented to situation   Memory Decreased recall of recent events   Following Commands Follows one step commands with increased time or repetition   Subjective   Subjective "I can try"   Bed Mobility   Supine to Sit 2  Maximal assistance   Additional items Assist x 1;Verbal cues; Bedrails; Increased time required;LE management   Sit to Supine 2  Maximal assistance   Additional items Assist x 1;Verbal cues; Increased time required; Bedrails;LE management   Additional Comments Able to sit at EOB x 4-5 minutes with Fair- progressing to Fair sitting balance. Transfers   Sit to Stand Unable to assess   Stand to Sit Unable to assess   Ambulation/Elevation   Gait Assistance Not tested   Balance   Static Sitting Fair   Dynamic Sitting Fair -   Activity Tolerance   Activity Tolerance Patient tolerated treatment well;Patient limited by fatigue   Nurse Made Aware yes LUISITO Dejesus   Exercises   Neuro re-ed Pt able to perform supine/seated exercise including ankle pumps, quad sets, heel slides, slr hip flex/abd, LAQ x 5-10 reps bilat   Assessment   Prognosis Good   Problem List Decreased strength;Decreased endurance; Impaired balance;Decreased mobility; Decreased coordination;Decreased cognition; Impaired judgement;Decreased safety awareness; Obesity; Decreased skin integrity;Pain   Assessment Pt agreeable to PT session this afternoon.  Able to perform exercise as mentioned above with verbal/tactile cues to improve form/ROM. Pt requires Max A to sit at EOB + increased time, verbal cues to maintain sitting balance. Able to tolerate sitting x 4-5 minutes with Fair sitting baalnce. Pt easily fatigues, reports dizziness that worsened while sitting - returned to supine and repositioned for comfort - BP assessed 133/59 with improvement in symptoms reported. The patient's AM-PAC Basic Mobility Inpatient Short Form Raw Score is 8. A Raw score of less than or equal to 16 suggests the patient may benefit from discharge to post-acute rehabilitation services. Please also refer to the recommendation of the Physical Therapist for safe discharge planning. Goals   Patient Goals unable to state   Plan   Treatment/Interventions Functional transfer training;ADL retraining;LE strengthening/ROM; Therapeutic exercise; Endurance training;Bed mobility;Gait training;Spoke to nursing;OT   Progress Slow progress, decreased activity tolerance   PT Frequency Other (Comment)  (5x/week)   Recommendation   PT Discharge Recommendation Post acute rehabilitation services   AM-PAC Basic Mobility Inpatient   Turning in Flat Bed Without Bedrails 2   Lying on Back to Sitting on Edge of Flat Bed Without Bedrails 2   Moving Bed to Chair 1   Standing Up From Chair Using Arms 1   Walk in Room 1   Climb 3-5 Stairs With Railing 1   Basic Mobility Inpatient Raw Score 8   Turning Head Towards Sound 3   Follow Simple Instructions 3   Low Function Basic Mobility Raw Score  14   Low Function Basic Mobility Standardized Score  22.01   Highest Level Of Mobility   JH-HLM Goal 3: Sit at edge of bed   JH-HLM Achieved 3: Sit at edge of bed   End of Consult   Patient Position at End of Consult Supine;Bed/Chair alarm activated; All needs within reach   [facility] Number  Steffany Henriette [id number]

## 2023-08-20 NOTE — ASSESSMENT & PLAN NOTE
· As evidenced by fever, tachycardia, tachypnea, elevated white count  · Initially patient presumed to have pneumonia. Chest x-ray on 8/11/2023 concerning for right lower lobe infiltrate. CT chest on 8/14/2023 with bibasilar atelectasis. Repeat chest x-ray showed bibasilar ectasis versus infiltrates  · Patient initially had been placed on ceftriaxone, broadened antibiotics to cefepime on 8/15 secondary to fever spike. Patient had another fever spike with hypotensive and tachycardia on 8/16/2023 and patient received 30 mill per KG fluid bolus and was transferred to ICU  · CT chest abdomen pelvis rule out occult source of infection was ordered on 8/16/2023 which showed new trace bilateral pleural effusions with small pericardial effusion with bilateral lower lobe dependent atelectasis with 11 cm right breast loculated fluid collection with colonic diverticulosis  · Procalcitonin level is minimally elevated  · MRSA surveillance was positive  · Patient was later changed to cefepime and vancomycin. Cefepime was later discontinued due to concern for encephalopathy  · Blood cultures from admission from 8/11/2023 have been negative. Repeat blood cultures from 8/16/2023 have also been negative.   · Body fluid culture from the breast with no growth  · Continue vancomycin with pharmacy consultation for dosing  · ID input appreciated

## 2023-08-20 NOTE — PLAN OF CARE
Problem: PHYSICAL THERAPY ADULT  Goal: Performs mobility at highest level of function for planned discharge setting. See evaluation for individualized goals. Description: Treatment/Interventions: Functional transfer training, ADL retraining, LE strengthening/ROM, Therapeutic exercise, Endurance training, Bed mobility, Gait training, Spoke to nursing, OT          See flowsheet documentation for full assessment, interventions and recommendations. Outcome: Progressing  Note: Prognosis: Good  Problem List: Decreased strength, Decreased endurance, Impaired balance, Decreased mobility, Decreased coordination, Decreased cognition, Impaired judgement, Decreased safety awareness, Obesity, Decreased skin integrity, Pain  Assessment: Pt agreeable to PT session this afternoon. Able to perform exercise as mentioned above with verbal/tactile cues to improve form/ROM. Pt requires Max A to sit at EOB + increased time, verbal cues to maintain sitting balance. Able to tolerate sitting x 4-5 minutes with Fair sitting baalnce. Pt easily fatigues, reports dizziness that worsened while sitting - returned to supine and repositioned for comfort - BP assessed 133/59 with improvement in symptoms reported. PT Discharge Recommendation: Post acute rehabilitation services    See flowsheet documentation for full assessment.

## 2023-08-20 NOTE — PROGRESS NOTES
Shayy Ponce is a 72 y.o. female who is currently ordered Vancomycin IV with management by the Pharmacy Consult service. Relevant clinical data and objective / subjective history reviewed. Vancomycin Assessment:  Indication and Goal AUC/Trough: Pneumonia (goal -600, trough >10), -600, trough >10  Clinical Status:   Micro:   MRSA+  Renal Function:  SCr: 2.22 mg/dL  CrCl:  mL/min  Renal replacement: Not on dialysis  Days of Therapy: 5  Current Dose: 1250 mg IV prn trough < 15  Vancomycin Plan:  New Dose : 1000 mg (12.5mg/kg) IV prn trough < 15. Dose will be administered today. Estimated AUC: 400-600 mcg*hr/mL  Estimated Trough: >10 mcg/mL  Next Level: 8/21/23 @0600  Renal Function Monitoring: Daily BMP and [place] will continue to follow closely for s/sx of nephrotoxicity, infusion reactions and appropriateness of therapy. BMP and CBC will be ordered per protocol. We will continue to follow the patient’s culture results and clinical progress daily.     Juanjo Renae Pharmacist

## 2023-08-20 NOTE — PLAN OF CARE
Problem: RESPIRATORY - ADULT  Goal: Achieves optimal ventilation and oxygenation  Description: INTERVENTIONS:  - Assess for changes in respiratory status  - Assess for changes in mentation and behavior  - Position to facilitate oxygenation and minimize respiratory effort  - Oxygen administered by appropriate delivery if ordered  - Initiate smoking cessation education as indicated  - Encourage broncho-pulmonary hygiene including cough, deep breathe, Incentive Spirometry  - Assess the need for suctioning and aspirate as needed  - Assess and instruct to report SOB or any respiratory difficulty  - Respiratory Therapy support as indicated  Outcome: Progressing     Problem: INFECTION - ADULT  Goal: Absence or prevention of progression during hospitalization  Description: INTERVENTIONS:  - Assess and monitor for signs and symptoms of infection  - Monitor lab/diagnostic results  - Monitor all insertion sites, i.e. indwelling lines, tubes, and drains  - Monitor endotracheal if appropriate and nasal secretions for changes in amount and color  - [place] appropriate cooling/warming therapies per order  - Administer medications as ordered  - Instruct and encourage patient and family to use good hand hygiene technique  - Identify and instruct in appropriate isolation precautions for identified infection/condition  Outcome: Progressing       Problem: SAFETY ADULT  Goal: Patient will remain free of falls  Description: INTERVENTIONS:  - Educate patient/family on patient safety including physical limitations  - Instruct patient to call for assistance with activity   - Consult OT/PT to assist with strengthening/mobility   - Keep Call bell within reach  - Keep bed low and locked with side rails adjusted as appropriate  - Keep care items and personal belongings within reach  - Initiate and maintain comfort rounds  - Make Fall Risk Sign visible to staff  - Offer Toileting every 2 Hours, in advance of need  - Initiate/Maintain bed alarm  - Obtain necessary fall risk management equipment:   - Apply yellow socks and bracelet for high fall risk patients  - Consider moving patient to room near nurses station  Outcome: Progressing  Goal: Maintain or return to baseline ADL function  Description: INTERVENTIONS:  -  Assess patient's ability to carry out ADLs; assess patient's baseline for ADL function and identify physical deficits which impact ability to perform ADLs (bathing, care of mouth/teeth, toileting, grooming, dressing, etc.)  - Assess/evaluate cause of self-care deficits   - Assess range of motion  - Assess patient's mobility; develop plan if impaired  - Assess patient's need for assistive devices and provide as appropriate  - Encourage maximum independence but intervene and supervise when necessary  - Involve family in performance of ADLs  - Assess for home care needs following discharge   - Consider OT consult to assist with ADL evaluation and planning for discharge  - Provide patient education as appropriate  Outcome: Progressing  Goal: Maintains/Returns to pre admission functional level  Description: INTERVENTIONS:  - Perform BMAT or MOVE assessment daily.   - Set and communicate daily mobility goal to care team and patient/family/caregiver. - Collaborate with rehabilitation services on mobility goals if consulted  - Perform Range of Motion 2 times a day. - Reposition patient every 2 hours.   - Dangle patient 2 times a day  - Stand patient 2 times a day  - Out of bed to chair 2 times a day   - Out of bed for meals 2 times a day  - Out of bed for toileting  - Record patient progress and toleration of activity level   Outcome: Progressing     Problem: DISCHARGE PLANNING  Goal: Discharge to home or other facility with appropriate resources  Description: INTERVENTIONS:  - Identify barriers to discharge w/patient and caregiver  - Arrange for needed discharge resources and transportation as appropriate  - Identify discharge learning needs (meds, wound care, etc.)  - Arrange for interpretive services to assist at discharge as needed  - Refer to Case Management Department for coordinating discharge planning if the patient needs post-hospital services based on physician/advanced practitioner order or complex needs related to functional status, cognitive ability, or social support system  Outcome: Progressing     Problem: Knowledge Deficit  Goal: Patient/family/caregiver demonstrates understanding of disease process, treatment plan, medications, and discharge instructions  Description: Complete learning assessment and assess knowledge base.   Interventions:  - Provide teaching at level of understanding  - Provide teaching via preferred learning methods  Outcome: Progressing     Problem: CARDIOVASCULAR - ADULT  Goal: Maintains optimal cardiac output and hemodynamic stability  Description: INTERVENTIONS:  - Monitor I/O, vital signs and rhythm  - Monitor for S/S and trends of decreased cardiac output  - Administer and titrate ordered vasoactive medications to optimize hemodynamic stability  - Assess quality of pulses, skin color and temperature  - Assess for signs of decreased coronary artery perfusion  - Instruct patient to report change in severity of symptoms  Outcome: Progressing  Goal: Absence of cardiac dysrhythmias or at baseline rhythm  Description: INTERVENTIONS:  - Continuous cardiac monitoring, vital signs, obtain 12 lead EKG if ordered  - Administer antiarrhythmic and heart rate control medications as ordered  - Monitor electrolytes and administer replacement therapy as ordered  Outcome: Progressing     Problem: METABOLIC, FLUID AND ELECTROLYTES - ADULT  Goal: Electrolytes maintained within normal limits  Description: INTERVENTIONS:  - Monitor labs and assess patient for signs and symptoms of electrolyte imbalances  - Administer electrolyte replacement as ordered  - Monitor response to electrolyte replacements, including repeat lab results as appropriate  - Instruct patient on fluid and nutrition as appropriate  Outcome: Progressing  Goal: Fluid balance maintained  Description: INTERVENTIONS:  - Monitor labs   - Monitor I/O and WT  - Instruct patient on fluid and nutrition as appropriate  - Assess for signs & symptoms of volume excess or deficit  Outcome: Progressing  Goal: Glucose maintained within target range  Description: INTERVENTIONS:  - Monitor Blood Glucose as ordered  - Assess for signs and symptoms of hyperglycemia and hypoglycemia  - Administer ordered medications to maintain glucose within target range  - Assess nutritional intake and initiate nutrition service referral as needed  Outcome: Progressing     Problem: SKIN/TISSUE INTEGRITY - ADULT  Goal: Skin Integrity remains intact(Skin Breakdown Prevention)  Description: Assess:  -Perform Hung assessment every shift  -Clean and moisturize skin every 2 hours and as needed  -Inspect skin when repositioning, toileting, and assisting with ADLS  -Assess under medical devices such as oxygen tubing every 2 hours  -Assess extremities for adequate circulation and sensation     Bed Management:  -Have minimal linens on bed & keep smooth, unwrinkled  -Change linens as needed when moist or perspiring  -Avoid sitting or lying in one position for more than 2 hours while in bed  -Keep HOB at 30degrees     Toileting:  -Offer bedside commode  -Assess for incontinence every 2 hours  -Use incontinent care products after each incontinent episode such as pads and external urinary catheter    Activity:  -Mobilize patient 2 times a day  -Encourage activity and walks on unit  -Encourage or provide ROM exercises   -Turn and reposition patient every 2 Hours  -Use appropriate equipment to lift or move patient in bed  -Instruct/ Assist with weight shifting every 2 hours when out of bed in chair  -Consider limitation of chair time 2 hour intervals    Skin Care:  -Avoid use of baby powder, tape, friction and shearing, hot water or constrictive clothing  -Relieve pressure over bony prominences using pillows  -Do not massage red bony areas      Outcome: Progressing  Goal: Incision(s), wounds(s) or drain site(s) healing without S/S of infection  Description: INTERVENTIONS  - Assess and document dressing, incision, wound bed, drain sites and surrounding tissue  - Provide patient and family education  - Perform skin care/dressing changes every day and as needed  Outcome: Progressing  Goal: Pressure injury heals and does not worsen  Description: Interventions:  - Implement low air loss mattress or specialty surface (Criteria met)  - Apply silicone foam dressing  - Instruct/assist with weight shifting every 120 minutes when in chair   - Limit chair time to 2 hour intervals  - Use special pressure reducing interventions such as EHOB  when in chair   - Apply fecal or urinary incontinence containment device   - Perform passive or active ROM every 2 hours  - Turn and reposition patient & offload bony prominences every 2 hours   - Utilize friction reducing device or surface for transfers   - Consider consults to  interdisciplinary teams such as PT  - Use incontinent care products after each incontinent episode such as pads  - Consider nutrition services referral as needed  Outcome: Progressing     Problem: MOBILITY - ADULT  Goal: Maintain or return to baseline ADL function  Description: INTERVENTIONS:  -  Assess patient's ability to carry out ADLs; assess patient's baseline for ADL function and identify physical deficits which impact ability to perform ADLs (bathing, care of mouth/teeth, toileting, grooming, dressing, etc.)  - Assess/evaluate cause of self-care deficits   - Assess range of motion  - Assess patient's mobility; develop plan if impaired  - Assess patient's need for assistive devices and provide as appropriate  - Encourage maximum independence but intervene and supervise when necessary  - Involve family in performance of ADLs  - Assess for home care needs following discharge   - Consider OT consult to assist with ADL evaluation and planning for discharge  - Provide patient education as appropriate  Outcome: Progressing  Goal: Maintains/Returns to pre admission functional level  Description: INTERVENTIONS:  - Perform BMAT or MOVE assessment daily.   - Set and communicate daily mobility goal to care team and patient/family/caregiver. - Collaborate with rehabilitation services on mobility goals if consulted  - Perform Range of Motion 2 times a day. - Reposition patient every 2 hours. - Dangle patient 2 times a day  - Stand patient 2 times a day  - Out of bed to chair 2 times a day   - Out of bed for meals 2 times a day  - Out of bed for toileting  - Record patient progress and toleration of activity level   Outcome: Progressing     Problem: Prexisting or High Potential for Compromised Skin Integrity  Goal: Skin integrity is maintained or improved  Description: INTERVENTIONS:  - Identify patients at risk for skin breakdown  - Assess and monitor skin integrity  - Assess and monitor nutrition and hydration status  - Monitor labs   - Assess for incontinence   - Turn and reposition patient  - Assist with mobility/ambulation  - Relieve pressure over bony prominences  - Avoid friction and shearing  - Provide appropriate hygiene as needed including keeping skin clean and dry  - Evaluate need for skin moisturizer/barrier cream  - Collaborate with interdisciplinary team   - Patient/family teaching  - Consider wound care consult   Outcome: Progressing     Problem: Nutrition/Hydration-ADULT  Goal: Nutrient/Hydration intake appropriate for improving, restoring or maintaining nutritional needs  Description: Monitor and assess patient's nutrition/hydration status for malnutrition. Collaborate with interdisciplinary team and initiate plan and interventions as ordered. Monitor patient's weight and dietary intake as ordered or per policy.  Utilize nutrition screening tool and intervene as necessary. Determine patient's food preferences and provide high-protein, high-caloric foods as appropriate.      INTERVENTIONS:  - Monitor oral intake, urinary output, labs, and treatment plans  - Assess nutrition and hydration status and recommend course of action  - Evaluate amount of meals eaten  - Assist patient with eating if necessary   - Allow adequate time for meals  - Recommend/ encourage appropriate diets, oral nutritional supplements, and vitamin/mineral supplements  - Order, calculate, and assess calorie counts as needed  -- Assess need for intravenous fluids  - Provide specific nutrition/hydration education as appropriate  - Include patient/family/caregiver in decisions related to nutrition  Outcome: Progressing

## 2023-08-20 NOTE — ASSESSMENT & PLAN NOTE
Baseline hemoglobin has been in the range of 10 which dropped to 8.1  No evidence of GI bleeding or blood in the urine  Likely secondary to bleeding from the breast after the placement of breast drain  Monitor hemoglobin

## 2023-08-20 NOTE — ASSESSMENT & PLAN NOTE
Patient did not have a bowel movement in 3 days and complaining of some abdominal discomfort and nausea   patient started MiraLAX 17 g p.o. daily and was given Dulcolax suppository without bowel movement  Patient also received senna last night and did have good bowel movement

## 2023-08-20 NOTE — ASSESSMENT & PLAN NOTE
· Initially held metoprolol and Demadex  · Blood pressure improved, metoprolol resumed  · Patient appeared volume overloaded with +4 pitting edema bilateral lower extremities, received one-time dose of Lasix 20 mg IV on 8/12, 40 mg on 8/15 and 8/16 with improved creatinine  · Later Demadex and Lasix were discontinued as patient became hypotensive on 8/16/2023. Patient received IV fluids on 8/16/2023 which were later discontinued. · Blood pressure is much better. Metoprolol dose was increased to 50 mg p.o. twice daily.

## 2023-08-20 NOTE — ASSESSMENT & PLAN NOTE
· Diagnosed in March 2023  · Status postlumpectomy on 5/30/2023  · Radiation was to start 8/16 at [facility]  · Outpatient follow-up with oncology

## 2023-08-20 NOTE — ASSESSMENT & PLAN NOTE
· Patient wears oxygen at home at night with her CPAP but is not oxygen dependent during the day  · Keep O2 sats greater than 92%  · Currently stable on 2 L with O2 saturation in mid 90s.   · Continue CPAP nightly  · Encourage incentive spirometry and pulmonary toilet

## 2023-08-20 NOTE — ASSESSMENT & PLAN NOTE
Lab Results   Component Value Date    EGFR 21 08/20/2023    EGFR 31 08/19/2023    EGFR 26 08/18/2023    CREATININE 2.22 (H) 08/20/2023    CREATININE 1.62 (H) 08/19/2023    CREATININE 1.88 (H) 08/18/2023   · Suspected secondary to ATN in the setting of infection with hypotension and also possible contribution from cardio renal syndrome  · Creatinine baseline is 1.5-1.8  · Creatinine upon admission was 2.9   · Renal ultrasound showed no evidence of hydronephrosis  · Patient initially received few dose of IV Lasix which were later discontinued and patient received IV fluids and patient became hypotensive on 8/16/2023 following which IV fluids have been on hold  · Creatinine has increased to 2.2 today from 1.6 yesterday. Continue to hold diuretics. Patient was 6 pounds since yesterday.   · Nephrology has been following  ·   · Nephrology consulted  · Echo cardiogram shows EF 55%  · Monitor Is/Os

## 2023-08-20 NOTE — ASSESSMENT & PLAN NOTE
· Patient has been on Eliquis which has been on hold as patient had significant bleeding from the breast after placement of drain. If hemoglobin continues remained stable possible restarting Eliquis tomorrow  · Continue metoprolol  · Patient had couple of episodes of atrial fibrillation with rapid ventricular late on the night of 8/18/2023 needing as needed IV Lopressor  · Metoprolol dose increased to 50 mg p.o. twice daily.   Heart rate currently controlled

## 2023-08-20 NOTE — ASSESSMENT & PLAN NOTE
Patient noted to have edema with erythematous skin on the right lateral breast  CAT scan of the chest from 8/16/2023 with loculated fluid collection measuring 11 cm  IR was consulted-patient underwent drainage of 160 mL of purulent fluid from the right breast with placement of catheter. Postprocedure patient developed significant bleeding. Area was cauterized and thrombin gel injected to stop the bleeding and tube was upsized to a 12 [place] tube. Eliquis has been on hold.   Breast drain with 30 cc drainage yesterday

## 2023-08-20 NOTE — ASSESSMENT & PLAN NOTE
· Toxic metabolic encephalopathy secondary to sepsis/SEBASTIAN with contribution from cefepime  · On morning of 8/16 patient's mentation noted to be slowed, patient reported feeling foggy  · ABG performed pH 7.392, PCO2 45.9, PO2 74.6 and bicarb 26.0  · Patient also was hypotensive and received fluid bolus  · Patient later also had some expressive/receptive aphasia with some mild upper extremity weakness on 8/17/2023. Stroke alert was called and patient had a CAT scan of the brain which was negative for any acute findings. CTA could not be performed due to CKD. · MRI of the brain showed no acute infarction edema or pathological enhancement  · MRA head showed no large vessel occlusion or focal stenosis. · MRA of the carotids without any hemodynamically significant stenosis  · HemoGlobin A1c was 9.2  · Felt to be secondary to sepsis and acute kidney injury. Continue Eliquis and statin as per neurology. · Mental status has improved and is close to baseline.

## 2023-08-20 NOTE — ASSESSMENT & PLAN NOTE
Lab Results   Component Value Date    HGBA1C 9.2 (H) 08/18/2023       Recent Labs     08/19/23  2134 08/20/23  0215 08/20/23  0720 08/20/23  1139   POCGLU 169* 158* 147* 208*       Blood Sugar Average: Last 72 hrs:  (P) [id number]   · Continue 25 units of lantus insulin at bedtime, 5 units of Humalog 3 times daily with meals and insulin sliding scale  · Patient is on diabetic diet

## 2023-08-20 NOTE — PROGRESS NOTES
[address] NOTE   Neisha Hilton 72 y.o. female MRN: [id number]  Unit/Bed#: [address] [place] [address] Encounter: [id number]  Reason for Consult: SEBASTIAN on CKD    ASSESSMENT and PLAN:  72-year-old female with history of CKD admitted with sepsis secondary to pneumonia. We are consulted for management of SEBASTIAN on CKD. 1. Acute kidney injury. Etiology of acute kidney injury most likely ATN in setting of infection +/- episodes of hypotension and contribution of cardiorenal syndrome. She is currently receiving vancomycin and vancomycin levels have been acceptable. Etiology of CKD most likely hypertension, diabetes, cardiorenal syndrome. Creatinine at baseline 1.5-1.8, creatinine at presentation 1.64, peak creatinine 2.9 on 08/15, creatinine aditya 1.6208/19, creatinine today 2.2. Urinalysis showed trace protein, 0-1 RBC, 4-10 WBC. UACR 55 mg/g 03/2023. Kidney ultrasound did not show hydronephrosis. She was given a trial of IV Lasix on 08/15 and 08/16 which led to improvement in her serum creatinine. However subsequently she had a drop in her blood pressure on 08/16 and diuretics were discontinued. She was given IV fluids on 08/16. Since then IV fluids have been on hold. Creatinine was trending down as of yesterday and there is a slight bump today to 2.2. Patient overall looks to be in a good volume state. She has lost 6 pounds since yesterday. We will continue to hold diuretics at this time. No IV fluids at this time. Trend BMP. No indication for renal placement therapy. 2. Hypertension/volume. She has history of diastolic CHF and follows with heart failure service. At home she takes Toprol-XL 50 mg twice daily and torsemide 40 mg daily. CT chest on admission consistent with mild septal thickening suggestive of interstitial edema. Cardiac BNP was also elevated at 213. Chest x-ray 08/16 showed improvement without any evidence of pulmonary edema. She is currently on a diuretic holiday.   Volume status overall looks stable with weight loss in last 24 hours. Continue to observe off diuretics. 3. Anemia of CKD. Baseline hemoglobin is around 10-11. Currently hemoglobin 8.1. She had a 2 g drop in her hemoglobin since yesterday. No obvious reports of any source of bleeding at this time. Continue to monitor hemoglobin. Serum ferritin 168, iron saturation 5 consistent with functional iron deficiency. Holding off on iron supplementation in setting of infection. We will consider intravenous iron once he recovers from infection. No indication for EDILIA. 4. Hyperkalemia. Potassium mildly elevated to 5.4 this morning. Give Lokelma 10 g x 1. Use low potassium diet. 5. Bone mineral disease. 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 Ionized calcium was low 08/15 and she was given IV calcium gluconate due to intermittent twitching. Calcium level is currently normalized and twitching is resolved. 6.  Right breast abscess. Status post IR drain placement. Currently on IV vancomycin per level. Infectious diseases following. 7. New onset twitching 08/16. This was most likely due to combination of hypocalcemia and high-dose of pregabalin in setting of SEBASTIAN. Twitching is currently resolved after intravenous calcium supplementation and holding pregabalin. 8.  Change in mental status. This is currently improved. Stroke alert was called 08/17 and she underwent CT head, MRI brain and MRA of head and neck that have been unrevealing at this time. Cefepime being considered as possibility of altered sensorium. Cefepime has been discontinued and mental status has improved. Discussed with internal medicine team.  After discussion, we agreed that kidney function has slightly worsened in last 24 hours but overall improved from earlier in the admission and to continue to observe off diuretics.   We also discussed to follow-up on drop in hemoglobin today with repeat CBC later today. SUBJECTIVE / 24H INTERVAL HISTORY:  Urine output recorded as 350 cc. Weight is down from 263 pounds to 257 pounds. Denies dyspnea. No worsening in oxygen requirement. Review of Systems   Constitutional: Negative for chills and fever. HENT: Negative for ear pain and sore throat. Eyes: Negative for pain and visual disturbance. Respiratory: Negative for cough and shortness of breath. Cardiovascular: Negative for chest pain and palpitations. Gastrointestinal: Negative for abdominal pain and vomiting. Genitourinary: Negative for dysuria and hematuria. Musculoskeletal: Negative for arthralgias and back pain. Skin: Negative for color change and rash. Neurological: Negative for seizures and syncope. All other systems reviewed and are negative. OBJECTIVE:  Current Weight: Weight - Scale: 117 kg (257 lb 12.8 oz)  Vitals:    08/19/23 2231 08/19/23 2311 08/20/23 0301 08/20/23 0546   BP:  132/83 (!) 126/47    BP Location:       Pulse:  79 65    Resp:  18 18    Temp:  97.9 °F (36.6 °C) 97.8 °F (36.6 °C)    TempSrc:       SpO2: 98% 97% 96%    Weight:    117 kg (257 lb 12.8 oz)   Height:           Intake/Output Summary (Last 24 hours) at 8/20/2023 0723  Last data filed at 8/20/2023 0546  Gross per 24 hour   Intake 60 ml   Output 330 ml   Net -270 ml     Physical Exam  Vitals and nursing note reviewed. Constitutional:       General: She is not in acute distress. Appearance: She is well-developed. She is obese. HENT:      Head: Normocephalic and atraumatic. Eyes:      Conjunctiva/sclera: Conjunctivae normal.   Cardiovascular:      Rate and Rhythm: Normal rate and regular rhythm. Pulses: Normal pulses. Heart sounds: Normal heart sounds. No murmur heard. Pulmonary:      Effort: Pulmonary effort is normal. No respiratory distress. Comments: Decreased breath sounds bilaterally at bases  Abdominal:      Palpations: Abdomen is soft. Tenderness:  There is no abdominal tenderness. Musculoskeletal:         General: No swelling. Cervical back: Neck supple. Right lower leg: No edema. Left lower leg: No edema. Skin:     General: Skin is warm and dry. Capillary Refill: Capillary refill takes less than 2 seconds. Neurological:      Mental Status: She is alert.    Psychiatric:         Mood and Affect: Mood normal.       Medications:    Current Facility-Administered Medications:   •  acetaminophen (TYLENOL) tablet 650 mg, 650 mg, Oral, Q6H PRN, Georgi Dye MD, 650 mg at 08/19/23 0834  •  ammonium lactate (LAC-HYDRIN) 12 % lotion 1 Application, 1 Application, Topical, BID PRN, Jaky Coronado MD, 1 Application at 51/50/86 0824  •  aspirin chewable tablet 81 mg, 81 mg, Oral, Daily, Jaky Coronado MD, 81 mg at 08/19/23 0834  •  atorvastatin (LIPITOR) tablet 40 mg, 40 mg, Oral, Daily With Ashley Martinez MD, 40 mg at 08/19/23 1655  •  bisacodyl (DULCOLAX) rectal suppository 10 mg, 10 mg, Rectal, Daily PRN, Jaky Coronado MD, 10 mg at 08/19/23 1235  •  fluticasone (FLONASE) 50 mcg/act nasal spray 1 spray, 1 spray, Nasal, Daily, Jaky Coronado MD, 1 spray at 08/19/23 0839  •  Fluticasone Furoate-Vilanterol 100-25 mcg/actuation 1 puff, 1 puff, Inhalation, Daily, Jaky Coronado MD, 1 puff at 08/19/23 0839  •  insulin glargine (LANTUS) subcutaneous injection 25 Units 0.25 mL, 25 Units, Subcutaneous, HS, Jaky Coronado MD, 25 Units at 08/19/23 2206  •  insulin lispro (HumaLOG) 100 units/mL subcutaneous injection 1-5 Units, 1-5 Units, Subcutaneous, TID AC, 1 Units at 08/19/23 1152 **AND** Fingerstick Glucose (POCT), , , TID AC, Georgi Dye MD  •  insulin lispro (HumaLOG) 100 units/mL subcutaneous injection 1-5 Units, 1-5 Units, Subcutaneous, HS, Georgi Dye MD, 1 Units at 08/19/23 2201  •  insulin lispro (HumaLOG) 100 units/mL subcutaneous injection 1-5 Units, 1-5 Units, Subcutaneous, 0200, Georgi MD Hardik, 1 Units at 08/20/23 0219  •  insulin lispro (HumaLOG) 100 units/mL subcutaneous injection 5 Units, 5 Units, Subcutaneous, TID With Meals, Clarisa Roach MD, 5 Units at 08/19/23 1153  •  metoprolol (LOPRESSOR) injection 5 mg, 5 mg, Intravenous, Q6H PRN, Clarisa Roach MD, 5 mg at 08/19/23 0412  •  metoprolol tartrate (LOPRESSOR) tablet 50 mg, 50 mg, Oral, Q12H [address], Clarisa Roach MD, 50 mg at 08/19/23 2206  •  montelukast (SINGULAIR) tablet 10 mg, 10 mg, Oral, HS, Clarisa Roach MD, 10 mg at 08/19/23 2206  •  nystatin (MYCOSTATIN) powder, , Topical, BID, Clarisa Roach MD, Given at 08/19/23 1722  •  ondansetron (ZOFRAN) injection 4 mg, 4 mg, Intravenous, Q6H PRN, Clarisa Roach MD, 4 mg at 08/19/23 1547  •  pantoprazole (PROTONIX) EC tablet 40 mg, 40 mg, Oral, Daily, Clarisa Roach MD, 40 mg at 08/19/23 9795  •  polyethylene glycol (MIRALAX) packet 17 g, 17 g, Oral, Daily, Clarisa Roach MD, 17 g at 08/19/23 1155  •  saccharomyces boulardii (FLORASTOR) capsule 250 mg, 250 mg, Oral, BID, Clarisa Roach MD, 250 mg at 08/19/23 1722  •  senna (SENOKOT) tablet 17.2 mg, 2 tablet, Oral, HS, Clarisa Roach MD, 17.2 mg at 08/19/23 2206  •  Sodium Zirconium Cyclosilicate (Lokelma) 10 g, 10 g, Oral, Once, Carmen Leon MD  •  vancomycin (VANCOCIN) 1,250 mg in sodium chloride 0.9 % 250 mL IVPB, 15 mg/kg (Adjusted), Intravenous, Daily PRN, Clarisa Roach MD    Laboratory Results:  Results from last 7 days   Lab Units 08/20/23  0511 08/19/23  0457 08/18/23  1219 08/18/23  0543 08/17/23  0809 08/17/23  0547 08/16/23  1322 08/16/23  0455 08/15/23  0432   WBC Thousand/uL 10.96*  --  14.95* 14.96*  --  14.72* 13.31* 16.06* 11.61*   HEMOGLOBIN g/dL 8.1*  --  10.3* 10.5*  --  10.1* 10.0* 10.3* 9.1*   HEMATOCRIT % 28.4*  --  33.5* 34.3*  --  32.1* 31.4* 32.6* 30.2*   PLATELETS Thousands/uL 295  --  250 260  --  184 185 173 143*   POTASSIUM mmol/L 5.4* 4.3  --  4.8 4.9  --  4.7 5. 5* 4.6   CHLORIDE mmol/L 112* 117*  --  109* 106  --  103 104 102   CO2 mmol/L 29 24  --  26 26  --  28 28 28   BUN mg/dL 67* 53*  --  58* 65*  --  67* 65* 58*   CREATININE mg/dL 2.22* 1.62*  --  1.88* 2.39*  --  2.71* 2.70* 2.91*   CALCIUM mg/dL 9.0 8.2*  --  9.3 8.6  --  8.8 8.5 7.9*   MAGNESIUM mg/dL  --  2.2  --  2.3 2.0  --   --   --   --    PHOSPHORUS mg/dL  --  3.3  --   --  4.4*  --   --   --   --      Portions of the record may have been created with voice recognition software. Occasional wrong word or "sound a like" substitutions may have occurred due to the inherent limitations of voice recognition software. Read the chart carefully and recognize, using context, where substitutions have occurred. If you have any questions, please contact the dictating provider.

## 2023-08-20 NOTE — PROGRESS NOTES
[id number] [facility]  Progress Note  Name: Dk Otero  MRN: [id number]  Unit/Bed#: 913 [facility] 405-01 I Date of Admission: 8/11/2023   Date of Service: 8/20/2023 I Hospital Day: 9    Assessment/Plan   * Septic shock ([address])  Assessment & Plan  · As evidenced by fever, tachycardia, tachypnea, elevated white count  · Initially patient presumed to have pneumonia. Chest x-ray on 8/11/2023 concerning for right lower lobe infiltrate. CT chest on 8/14/2023 with bibasilar atelectasis. Repeat chest x-ray showed bibasilar ectasis versus infiltrates  · Patient initially had been placed on ceftriaxone, broadened antibiotics to cefepime on 8/15 secondary to fever spike. Patient had another fever spike with hypotensive and tachycardia on 8/16/2023 and patient received 30 mill per KG fluid bolus and was transferred to ICU  · CT chest abdomen pelvis rule out occult source of infection was ordered on 8/16/2023 which showed new trace bilateral pleural effusions with small pericardial effusion with bilateral lower lobe dependent atelectasis with 11 cm right breast loculated fluid collection with colonic diverticulosis  · Procalcitonin level is minimally elevated  · MRSA surveillance was positive  · Patient was later changed to cefepime and vancomycin. Cefepime was later discontinued due to concern for encephalopathy  · Blood cultures from admission from 8/11/2023 have been negative. Repeat blood cultures from 8/16/2023 have also been negative.   · Body fluid culture from the breast with no growth  · Continue vancomycin with pharmacy consultation for dosing  · ID input appreciated      Toxic metabolic encephalopathy  Assessment & Plan  · Toxic metabolic encephalopathy secondary to sepsis/SEBASTIAN with contribution from cefepime  · On morning of 8/16 patient's mentation noted to be slowed, patient reported feeling foggy  · ABG performed pH 7.392, PCO2 45.9, PO2 74.6 and bicarb 26.0  · Patient also was hypotensive and received fluid bolus  · Patient later also had some expressive/receptive aphasia with some mild upper extremity weakness on 8/17/2023. Stroke alert was called and patient had a CAT scan of the brain which was negative for any acute findings. CTA could not be performed due to CKD. · MRI of the brain showed no acute infarction edema or pathological enhancement  · MRA head showed no large vessel occlusion or focal stenosis. · MRA of the carotids without any hemodynamically significant stenosis  · HemoGlobin A1c was 9.2  · Felt to be secondary to sepsis and acute kidney injury. Continue Eliquis and statin as per neurology. · Mental status has improved and is close to baseline. Breast abscess of female  Assessment & Plan  Patient noted to have edema with erythematous skin on the right lateral breast  CAT scan of the chest from 8/16/2023 with loculated fluid collection measuring 11 cm  IR was consulted-patient underwent drainage of 160 mL of purulent fluid from the right breast with placement of catheter. Postprocedure patient developed significant bleeding. Area was cauterized and thrombin gel injected to stop the bleeding and tube was upsized to a 12 [place] tube. Eliquis has been on hold.   Breast drain with 30 cc drainage yesterday    Acute kidney injury superimposed on chronic kidney disease [facility])  Assessment & Plan  Lab Results   Component Value Date    EGFR 21 08/20/2023    EGFR 31 08/19/2023    EGFR 26 08/18/2023    CREATININE 2.22 (H) 08/20/2023    CREATININE 1.62 (H) 08/19/2023    CREATININE 1.88 (H) 08/18/2023   · Suspected secondary to ATN in the setting of infection with hypotension and also possible contribution from cardio renal syndrome  · Creatinine baseline is 1.5-1.8  · Creatinine upon admission was 2.9   · Renal ultrasound showed no evidence of hydronephrosis  · Patient initially received few dose of IV Lasix which were later discontinued and patient received IV fluids and patient became hypotensive on 8/16/2023 following which IV fluids have been on hold  · Creatinine has increased to 2.2 today from 1.6 yesterday. Continue to hold diuretics. Patient was 6 pounds since yesterday. · Nephrology has been following  ·   · Nephrology consulted  · Echo cardiogram shows EF 55%  · Monitor Is/Os    Essential hypertension  Assessment & Plan  · Initially held metoprolol and Demadex  · Blood pressure improved, metoprolol resumed  · Patient appeared volume overloaded with +4 pitting edema bilateral lower extremities, received one-time dose of Lasix 20 mg IV on 8/12, 40 mg on 8/15 and 8/16 with improved creatinine  · Later Demadex and Lasix were discontinued as patient became hypotensive on 8/16/2023. Patient received IV fluids on 8/16/2023 which were later discontinued. · Blood pressure is much better. Metoprolol dose was increased to 50 mg p.o. twice daily. Paroxysmal atrial fibrillation [facility])  Assessment & Plan  · Patient has been on Eliquis which has been on hold as patient had significant bleeding from the breast after placement of drain. If hemoglobin continues remained stable possible restarting Eliquis tomorrow  · Continue metoprolol  · Patient had couple of episodes of atrial fibrillation with rapid ventricular late on the night of 8/18/2023 needing as needed IV Lopressor  · Metoprolol dose increased to 50 mg p.o. twice daily.   Patient currently in sinus rhythm    Anemia  Assessment & Plan  Baseline hemoglobin has been in the range of 10 which dropped to 8.1  No evidence of GI bleeding or blood in the urine  Likely secondary to bleeding from the breast after the placement of breast drain  Monitor hemoglobin    Moderate persistent asthma without complication  Assessment & Plan  Continue Breo Ellipta and Singulair    Morbid obesity with BMI of 45.0-49.9, adult (HCC)  Assessment & Plan  · Dietary lifestyle modifications    Ductal carcinoma in situ (DCIS) of right breast  Assessment & Plan  · Diagnosed in March 2023  · Status postlumpectomy on 5/30/2023  · Radiation was to start 8/16 at [facility]  · Outpatient follow-up with oncology    Type 2 diabetes mellitus with hyperglycemia, without long-term current use of insulin [facility])  Assessment & Plan  Lab Results   Component Value Date    HGBA1C 9.2 (H) 08/18/2023       Recent Labs     08/19/23  2134 08/20/23  0215 08/20/23  0720 08/20/23  1139   POCGLU 169* 158* 147* 208*       Blood Sugar Average: Last 72 hrs:  (P) [id number]   · Continue 25 units of lantus insulin at bedtime, 5 units of Humalog 3 times daily with meals and insulin sliding scale  · Patient is on diabetic diet    Chronic respiratory failure with hypoxia and hypercapnia (HCC)  Assessment & Plan  · Patient wears oxygen at home at night with her CPAP but is not oxygen dependent during the day  · Keep O2 sats greater than 92%  · Currently stable on 2 L with O2 saturation in mid 90s. · Continue CPAP nightly  · Encourage incentive spirometry and pulmonary toilet    KATRINA (obstructive sleep apnea)  Assessment & Plan  · Continue CPAP 7 cm water hs    Constipation  Assessment & Plan  Patient did not have a bowel movement in 3 days and complaining of some abdominal discomfort and nausea   patient started MiraLAX 17 g p.o. daily and was given Dulcolax suppository without bowel movement  Patient also received senna last night and did have good bowel movement      Mixed hyperlipidemia  Assessment & Plan  Continue Lipitor             VTE Pharmacologic Prophylaxis: VTE Score: 5 High Risk (Score >/= 5) - Pharmacological DVT Prophylaxis Contraindicated. Sequential Compression Devices Ordered. Patient Centered Rounds: I performed bedside rounds with nursing staff today. Discussions with Specialists or Other Care Team Provider: Nephrology    Education and Discussions with Family / Patient: Updated  (sister) via phone.     Total Time Spent on Date of Encounter in care of patient: 45 minutes This time was spent on one or more of the following: performing physical exam; counseling and coordination of care; obtaining or reviewing history; documenting in the medical record; reviewing/ordering tests, medications or procedures; communicating with other healthcare professionals and discussing with patient's family/caregivers. Current Length of Stay: 9 day(s)  Current Patient Status: Inpatient   Certification Statement: The patient will continue to require additional inpatient hospital stay due to Shock, breast abscess, acute kidney injury on CKD, atrial fibrillation  Discharge Plan: Anticipate discharge in 48-72 hrs to Pending course    Code Status: Level 1 - Full Code    Subjective:   Patient had a bowel movement last night which was normal.  Denies any chest pain or shortness of breath or abdominal pain or breast pain. Objective:     Vitals:   Temp (24hrs), Av.9 °F (36.6 °C), Min:97.8 °F (36.6 °C), Max:98 °F (36.7 °C)    Temp:  [97.8 °F (36.6 °C)-98 °F (36.7 °C)] 97.9 °F (36.6 °C)  HR:  [] 74  Resp:  [18] 18  BP: (119-137)/(47-83) 133/59  SpO2:  [96 %-98 %] 97 %  Body mass index is 47.15 kg/m². Input and Output Summary (last 24 hours): Intake/Output Summary (Last 24 hours) at 2023 1516  Last data filed at 2023 0546  Gross per 24 hour   Intake --   Output 330 ml   Net -330 ml       Physical Exam:   Physical Exam  Constitutional:       Appearance: Normal appearance. HENT:      Head: Normocephalic and atraumatic. Nose: Nose normal.      Mouth/Throat:      Mouth: Mucous membranes are moist.      Pharynx: Oropharynx is clear. Eyes:      Extraocular Movements: Extraocular movements intact. Pupils: Pupils are equal, round, and reactive to light. Cardiovascular:      Rate and Rhythm: Normal rate. Rhythm irregular. Pulmonary:      Effort: Pulmonary effort is normal.      Breath sounds: Normal breath sounds.       Comments: Right breast drain with small amount of serosanguineous drainage  Abdominal:      General: Bowel sounds are normal. There is no distension. Palpations: Abdomen is soft. Tenderness: There is no abdominal tenderness. Musculoskeletal:         General: No swelling. Cervical back: Normal range of motion and neck supple. Right lower leg: Edema present. Left lower leg: Edema present. Skin:     General: Skin is warm and dry. Neurological:      General: No focal deficit present. Mental Status: She is alert. Additional Data:     Labs:  Results from last 7 days   Lab Units 08/20/23  0511   WBC Thousand/uL 10.96*   HEMOGLOBIN g/dL 8.1*   HEMATOCRIT % 28.4*   PLATELETS Thousands/uL 295   NEUTROS PCT % 65   LYMPHS PCT % 17   MONOS PCT % 11   EOS PCT % 4     Results from last 7 days   Lab Units 08/20/23  0511 08/17/23  0809 08/16/23  1322   SODIUM mmol/L 146   < > 139   POTASSIUM mmol/L 5.4*   < > 4.7   CHLORIDE mmol/L 112*   < > 103   CO2 mmol/L 29   < > 28   BUN mg/dL 67*   < > 67*   CREATININE mg/dL 2.22*   < > 2.71*   ANION GAP mmol/L 5   < > 8   CALCIUM mg/dL 9.0   < > 8.8   ALBUMIN g/dL  --   --  3.2*   TOTAL BILIRUBIN mg/dL  --   --  0.41   ALK PHOS U/L  --   --  43   ALT U/L  --   --  8   AST U/L  --   --  9*   GLUCOSE RANDOM mg/dL 129   < > 177*    < > = values in this interval not displayed.          Results from last 7 days   Lab Units 08/20/23  1139 08/20/23  0720 08/20/23  0215 08/19/23  2134 08/19/23  1636 08/19/23  1102 08/19/23  0641 08/19/23  0201 08/18/23  2048 08/18/23  1632 08/18/23  1128 08/18/23  0725   POC GLUCOSE mg/dl 208* 147* 158* 169* 162* 184* 148* 122 97 97 173* 201*     Results from last 7 days   Lab Units 08/18/23  0543   HEMOGLOBIN A1C % 9.2*     Results from last 7 days   Lab Units 08/18/23  0543 08/17/23  0547 08/16/23  1322 08/15/23  0432 08/14/23  0559 08/13/23  1956   LACTIC ACID mmol/L  --   --  0.7  --   --  0.6   PROCALCITONIN ng/ml 0.40* 0.56* 0.62* 0.36* 0.42*  -- Lines/Drains:  Invasive Devices     Peripheral Intravenous Line  Duration           Peripheral IV 23 Distal;Right;Ventral (anterior) Forearm 3 days    Peripheral IV 23 Left;Ventral (anterior) Forearm 1 day          Drain  Duration           External Urinary Catheter 3 days    Abscess Drain Breast 1 day                  Telemetry:  Telemetry Orders (From admission, onward)             24 Hour Telemetry Monitoring  Continuous x 24 Hours (Telem)           Question:  Reason for 24 Hour Telemetry  Answer:  Arrhythmias requiring acute medical intervention / PPM or ICD malfunction                 Telemetry Reviewed: Atrial fibrillation. HR averaging 70-90  Indication for Continued Telemetry Use: Arrthymias requiring medical therapy             Imaging: Reviewed radiology reports from this admission including: chest CT scan, abdominal/pelvic CT, CT head, MRI brain and xray(s) MRI of the carotids and MRA of the head    Recent Cultures (last 7 days):   Results from last 7 days   Lab Units 23  1424 23  1349 23  1322 23  1956   BLOOD CULTURE   --  No Growth at 72 hrs. No Growth at 72 hrs. No Growth After 5 Days. No Growth After 5 Days.    GRAM STAIN RESULT  1+ Polys  No bacteria seen  --   --   --    BODY FLUID CULTURE, STERILE  No growth  --   --   --        Last 24 Hours Medication List:   Current Facility-Administered Medications   Medication Dose Route Frequency Provider Last Rate   • acetaminophen  650 mg Oral Q6H PRN Gloria Abbott MD     • ammonium lactate  1 Application Topical BID PRN Gloria Abbott MD     • aspirin  81 mg Oral Daily Gloria Abbott MD     • atorvastatin  40 mg Oral Daily With Gómez Spencer MD     • bisacodyl  10 mg Rectal Daily PRN Gloria Abbott MD     • fluticasone  1 spray Nasal Daily Gloria Abbott MD     • Fluticasone Furoate-Vilanterol  1 puff Inhalation Daily Gloria Abbott MD     • insulin glargine  25 Units Subcutaneous HS Jayson Morales MD     • insulin lispro  1-5 Units Subcutaneous TID AC Jayson Morales MD     • insulin lispro  1-5 Units Subcutaneous HS Jayson Morales MD     • insulin lispro  1-5 Units Subcutaneous 0200 Jayson Morales MD     • insulin lispro  5 Units Subcutaneous TID With Meals Jayson Morales MD     • metoprolol  5 mg Intravenous Q6H PRN Jayson Morales MD     • metoprolol tartrate  50 mg Oral Q12H [address] Jayson Morales MD     • montelukast  10 mg Oral HS Jayson Morales MD     • nystatin   Topical BID Jayson Morales MD     • ondansetron  4 mg Intravenous Q6H PRN Jayson Morales MD     • pantoprazole  40 mg Oral Daily Jayson Morales MD     • polyethylene glycol  17 g Oral Daily Jayson Morales MD     • saccharomyces boulardii  250 mg Oral BID Jayson Morales MD     • senna  2 tablet Oral HS Jayson Morales MD     • vancomycin  12.5 mg/kg (Adjusted) Intravenous Daily PRN Gee Kapoor PA-C          Today, Patient Was Seen By: Jayson Morales MD    **Please Note: This note may have been constructed using a voice recognition system. **

## 2023-08-21 LAB
ANION GAP SERPL CALCULATED.3IONS-SCNC: 4 MMOL/L
BACTERIA BLD CULT: NORMAL
BACTERIA BLD CULT: NORMAL
BACTERIA SPEC ANAEROBE CULT: NO GROWTH
BASO STIPL BLD QL SMEAR: PRESENT
BASOPHILS # BLD MANUAL: 0 THOUSAND/UL (ref 0–0.1)
BASOPHILS NFR MAR MANUAL: 0 % (ref 0–1)
BUN SERPL-MCNC: 72 MG/DL (ref 5–25)
CALCIUM SERPL-MCNC: 8.7 MG/DL (ref 8.4–10.2)
CHLORIDE SERPL-SCNC: 110 MMOL/L (ref 96–108)
CO2 SERPL-SCNC: 28 MMOL/L (ref 21–32)
CREAT SERPL-MCNC: 2.38 MG/DL (ref 0.6–1.3)
CREAT UR-MCNC: 96.8 MG/DL
EOSINOPHIL # BLD MANUAL: 0.68 THOUSAND/UL (ref 0–0.4)
EOSINOPHIL NFR BLD MANUAL: 7 % (ref 0–6)
ERYTHROCYTE [DISTWIDTH] IN BLOOD BY AUTOMATED COUNT: 15.8 % (ref 11.6–15.1)
GFR SERPL CREATININE-BSD FRML MDRD: 19 ML/MIN/1.73SQ M
GLUCOSE SERPL-MCNC: 107 MG/DL (ref 65–140)
GLUCOSE SERPL-MCNC: 115 MG/DL (ref 65–140)
GLUCOSE SERPL-MCNC: 147 MG/DL (ref 65–140)
GLUCOSE SERPL-MCNC: 153 MG/DL (ref 65–140)
GLUCOSE SERPL-MCNC: 180 MG/DL (ref 65–140)
GLUCOSE SERPL-MCNC: 187 MG/DL (ref 65–140)
HCT VFR BLD AUTO: 28 % (ref 34.8–46.1)
HGB BLD-MCNC: 8.1 G/DL (ref 11.5–15.4)
LYMPHOCYTES # BLD AUTO: 1.95 THOUSAND/UL (ref 0.6–4.47)
LYMPHOCYTES # BLD AUTO: 20 % (ref 14–44)
MCH RBC QN AUTO: 27.8 PG (ref 26.8–34.3)
MCHC RBC AUTO-ENTMCNC: 28.9 G/DL (ref 31.4–37.4)
MCV RBC AUTO: 96 FL (ref 82–98)
MONOCYTES # BLD AUTO: 0.68 THOUSAND/UL (ref 0–1.22)
MONOCYTES NFR BLD: 7 % (ref 4–12)
MYELOCYTES NFR BLD MANUAL: 1 % (ref 0–1)
NEUTROPHILS # BLD MANUAL: 6.34 THOUSAND/UL (ref 1.85–7.62)
NEUTS BAND NFR BLD MANUAL: 4 % (ref 0–8)
NEUTS SEG NFR BLD AUTO: 61 % (ref 43–75)
PLATELET # BLD AUTO: 294 THOUSANDS/UL (ref 149–390)
PLATELET BLD QL SMEAR: ADEQUATE
PMV BLD AUTO: 10.2 FL (ref 8.9–12.7)
POTASSIUM SERPL-SCNC: 4.8 MMOL/L (ref 3.5–5.3)
PROT UR-MCNC: 99 MG/DL
PROT/CREAT UR: 1.02 MG/G{CREAT} (ref 0–0.1)
RBC # BLD AUTO: 2.91 MILLION/UL (ref 3.81–5.12)
RBC MORPH BLD: PRESENT
SODIUM SERPL-SCNC: 142 MMOL/L (ref 135–147)
VANCOMYCIN SERPL-MCNC: 17.6 UG/ML (ref 10–20)
WBC # BLD AUTO: 9.75 THOUSAND/UL (ref 4.31–10.16)

## 2023-08-21 PROCEDURE — 99233 SBSQ HOSP IP/OBS HIGH 50: CPT | Performed by: STUDENT IN AN ORGANIZED HEALTH CARE EDUCATION/TRAINING PROGRAM

## 2023-08-21 PROCEDURE — 80048 BASIC METABOLIC PNL TOTAL CA: CPT | Performed by: INTERNAL MEDICINE

## 2023-08-21 PROCEDURE — 97535 SELF CARE MNGMENT TRAINING: CPT

## 2023-08-21 PROCEDURE — 97168 OT RE-EVAL EST PLAN CARE: CPT

## 2023-08-21 PROCEDURE — 99232 SBSQ HOSP IP/OBS MODERATE 35: CPT | Performed by: INTERNAL MEDICINE

## 2023-08-21 PROCEDURE — 85007 BL SMEAR W/DIFF WBC COUNT: CPT | Performed by: INTERNAL MEDICINE

## 2023-08-21 PROCEDURE — 80202 ASSAY OF VANCOMYCIN: CPT | Performed by: PHYSICIAN ASSISTANT

## 2023-08-21 PROCEDURE — 82570 ASSAY OF URINE CREATININE: CPT | Performed by: STUDENT IN AN ORGANIZED HEALTH CARE EDUCATION/TRAINING PROGRAM

## 2023-08-21 PROCEDURE — 85027 COMPLETE CBC AUTOMATED: CPT | Performed by: INTERNAL MEDICINE

## 2023-08-21 PROCEDURE — 99233 SBSQ HOSP IP/OBS HIGH 50: CPT | Performed by: PHYSICIAN ASSISTANT

## 2023-08-21 PROCEDURE — 82948 REAGENT STRIP/BLOOD GLUCOSE: CPT

## 2023-08-21 PROCEDURE — 97530 THERAPEUTIC ACTIVITIES: CPT

## 2023-08-21 PROCEDURE — 84156 ASSAY OF PROTEIN URINE: CPT | Performed by: STUDENT IN AN ORGANIZED HEALTH CARE EDUCATION/TRAINING PROGRAM

## 2023-08-21 RX ORDER — TORSEMIDE 20 MG/1
20 TABLET ORAL DAILY
Status: DISCONTINUED | OUTPATIENT
Start: 2023-08-21 | End: 2023-08-21

## 2023-08-21 RX ORDER — TORSEMIDE 10 MG/1
10 TABLET ORAL DAILY
Status: DISCONTINUED | OUTPATIENT
Start: 2023-08-21 | End: 2023-08-22 | Stop reason: HOSPADM

## 2023-08-21 RX ADMIN — INSULIN GLARGINE 25 UNITS: 100 INJECTION, SOLUTION SUBCUTANEOUS at 21:48

## 2023-08-21 RX ADMIN — ASPIRIN 81 MG CHEWABLE TABLET 81 MG: 81 TABLET CHEWABLE at 08:41

## 2023-08-21 RX ADMIN — NYSTATIN: 100000 POWDER TOPICAL at 10:02

## 2023-08-21 RX ADMIN — INSULIN LISPRO 5 UNITS: 100 INJECTION, SOLUTION INTRAVENOUS; SUBCUTANEOUS at 11:46

## 2023-08-21 RX ADMIN — FLUTICASONE PROPIONATE 1 SPRAY: 50 SPRAY, METERED NASAL at 08:42

## 2023-08-21 RX ADMIN — METOPROLOL TARTRATE 50 MG: 50 TABLET, FILM COATED ORAL at 08:41

## 2023-08-21 RX ADMIN — INSULIN LISPRO 5 UNITS: 100 INJECTION, SOLUTION INTRAVENOUS; SUBCUTANEOUS at 08:42

## 2023-08-21 RX ADMIN — PANTOPRAZOLE SODIUM 40 MG: 40 TABLET, DELAYED RELEASE ORAL at 08:41

## 2023-08-21 RX ADMIN — INSULIN LISPRO 1 UNITS: 100 INJECTION, SOLUTION INTRAVENOUS; SUBCUTANEOUS at 08:41

## 2023-08-21 RX ADMIN — Medication 250 MG: at 17:00

## 2023-08-21 RX ADMIN — TORSEMIDE 10 MG: 10 TABLET ORAL at 10:37

## 2023-08-21 RX ADMIN — Medication 250 MG: at 08:41

## 2023-08-21 RX ADMIN — MONTELUKAST 10 MG: 10 TABLET, FILM COATED ORAL at 21:48

## 2023-08-21 RX ADMIN — APIXABAN 5 MG: 5 TABLET, FILM COATED ORAL at 17:00

## 2023-08-21 RX ADMIN — INSULIN LISPRO 5 UNITS: 100 INJECTION, SOLUTION INTRAVENOUS; SUBCUTANEOUS at 16:57

## 2023-08-21 RX ADMIN — NYSTATIN: 100000 POWDER TOPICAL at 17:00

## 2023-08-21 RX ADMIN — METOPROLOL TARTRATE 50 MG: 50 TABLET, FILM COATED ORAL at 21:48

## 2023-08-21 RX ADMIN — SENNOSIDES 17.2 MG: 8.6 TABLET, FILM COATED ORAL at 21:48

## 2023-08-21 RX ADMIN — POLYETHYLENE GLYCOL 3350 17 G: 17 POWDER, FOR SOLUTION ORAL at 10:01

## 2023-08-21 RX ADMIN — FLUTICASONE FUROATE AND VILANTEROL TRIFENATATE 1 PUFF: 100; 25 POWDER RESPIRATORY (INHALATION) at 08:42

## 2023-08-21 RX ADMIN — INSULIN LISPRO 1 UNITS: 100 INJECTION, SOLUTION INTRAVENOUS; SUBCUTANEOUS at 11:46

## 2023-08-21 RX ADMIN — INSULIN LISPRO 1 UNITS: 100 INJECTION, SOLUTION INTRAVENOUS; SUBCUTANEOUS at 02:02

## 2023-08-21 RX ADMIN — ATORVASTATIN CALCIUM 40 MG: 40 TABLET, FILM COATED ORAL at 17:00

## 2023-08-21 NOTE — PROGRESS NOTES
Progress Note - Infectious Disease   Laural Nearing 72 y.o. female MRN: [id number]  Unit/Bed#: [address] [place] [address] Encounter: [id number]      Impression/Plan:  1. Severe sepsis, presenting with fever of 103 F, tachycardia, tachypnea and leukocytosis and now hypotension worsening. Initially presumed pneumonia. Continues to spike fever and now hypotension despite 6 days of IV cephalosporins. Blood cultures x 6, urinary antigens, RSV/FLU/COVID19 screen negative. 8/14/23 CT chest no definite pneumonia. procal 0.62. unclear source, possible aspiration. 8/16/23 CT with small pleural effusions. MRSA nares +, #2 suspicious source   -continue Vancomycin IV renal dosed by pharmacy   -follow-up cultures and adjust antibiotics as needed  -monitor temperature and hemodynamics  -serial exam  -monitor am labs  -aspiration precautions  -follow up IR drainage and cultures     2. Right breast collection. In setting of #7   8/16/23 CT chest: right breast loculated fluid collection 11 cm  S/p IR aspiration of 160 mL of purulent fluid. Body fluid cx no growth to date  -continues IV Vancomycin  -follow up IR drainage and cultures  -serial exam    3. Encephalopathy. With increased lethargy/altered mental status. Consider aspiration. Consider medication side effect. 8/17/23 CT brain: no acute intracranial abnormality. 8/18/23 MRI/MRA Head/Neck - Stroke work up negative to date. Mentation much improved, close to baseline  -discontinued Cefepime 8/17/23  -Management/work-up as above  -follow swallow   -aspiration precautions as needed     4. SEBASTIAN on CKD. Peak creatinine 2.91 > 2.38. Baseline 1.7 8/21/23 Vancomycin level 17.6  -renal dose adjust antibiotic as needed  -volume management per nephrology  -Vancomycin dosing per pharmacy  -recheck BMP     5. Type 2 diabetes mellitus. Hemoglobin A1c on 8/13/2023 9.3. Risk factor for infection  -Blood glucose management per primary care team     6. Morbid Obesity. BMI 47.9.  Can affect antibiotic absorption/dosing     7. 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 Radiation was to [facility] at [facility] 2023. Inframammary bilateral intertrigo with powder applied. 23 CT chest: right breast loculated fluid collection 11cm  -serial exam  -continue Vancomycin  -management/work up as above     Antibiotics:  Vancomycin D3     Above impression and plan discussed in detail with patient, family at bedside, RN, , and the primary care team.    Subjective:  Patient has declining temperature since addition of Vancomycin, no reported chills, sweats; no nausea, vomiting, diarrhea; no cough, shortness of breath; + right breast tenderness much improved on exam. Patient does not recall much of events last week. She is close to baseline mentation now and names her 2 daughters and 3 granddaughters at bedside. Objective:  Vitals:  Temp:  [97.5 °F (36.4 °C)-98 °F (36.7 °C)] 97.5 °F (36.4 °C)  HR:  [60-74] 60  Resp:  [18-20] 20  BP: (117-151)/(52-64) 130/56  SpO2:  [96 %-98 %] 98 %  Temp (24hrs), Av.8 °F (36.6 °C), Min:97.5 °F (36.4 °C), Max:98 °F (36.7 °C)  Current: Temperature: 97.5 °F (36.4 °C)    Physical Exam:   General Appearance:  72year old female, chronically debilitated, propped in bed, alert and oriented, no acute distress. HEENT: Atraumatic normocephalic   Throat: Oropharynx moist.   Pulmonary:   Decreased breath sounds, Normal respiratory excursion without accessory muscle use, statting 98% on 2L NCO2   Cardiac:  RRR   Abdomen:   Soft, non-tender, protuberant pannus, + bowel sounds   Extremities: No edema   : No oneal, no SPT   Psychiatric: Awake, cooperative   Skin: No new rashes. IV site nontender. Inframammary intertrigo drying with powder applied. Right breast ELIESER drain with mild cloudy output, right breast much less induration.            Labs, Imaging, & Other studies:   All pertinent labs and imaging studies were personally reviewed  Results from last 7 days   Lab Units 08/21/23  0606 08/20/23  1600 08/20/23  0511 08/18/23  1219   WBC Thousand/uL 9.75  --  10.96* 14.95*   HEMOGLOBIN g/dL 8.1* 8.0* 8.1* 10.3*   PLATELETS Thousands/uL 294  --  295 250     Results from last 7 days   Lab Units 08/21/23  0606 08/20/23  0511 08/19/23  0457 08/17/23  0809 08/16/23  1322   SODIUM mmol/L 142 146 147   < > 139   POTASSIUM mmol/L 4.8 5.4* 4.3   < > 4.7   CHLORIDE mmol/L 110* 112* 117*   < > 103   CO2 mmol/L 28 29 24   < > 28   BUN mg/dL 72* 67* 53*   < > 67*   CREATININE mg/dL 2.38* 2.22* 1.62*   < > 2.71*   EGFR ml/min/1.73sq m 19 21 31   < > 17   CALCIUM mg/dL 8.7 9.0 8.2*   < > 8.8   AST U/L  --   --   --   --  9*   ALT U/L  --   --   --   --  8   ALK PHOS U/L  --   --   --   --  43    < > = values in this interval not displayed. Results from last 7 days   Lab Units 08/18/23  1424 08/16/23  1725 08/16/23  1349 08/16/23  1339 08/16/23  1322   BLOOD CULTURE   --   --  No Growth After 4 Days. --  No Growth After 4 Days. GRAM STAIN RESULT  1+ Polys  No bacteria seen  --   --   --   --    BODY FLUID CULTURE, STERILE  No growth  --   --   --   --    MRSA CULTURE ONLY   --  1+ Growth of Methicillin Resistant Staphylococcus aureus*  --  Methicillin Resistant Staphylococcus aureus isolated*  This patient requires contact isolation precautions per [facility] law. Contact precautions are not required in [place] for nasal surveillance cultures.   --      Results from last 7 days   Lab Units 08/18/23  0543 08/17/23  0547 08/16/23  1322 08/15/23  0432   PROCALCITONIN ng/ml 0.40* 0.56* 0.62* 0.36*         Results from last 7 days   Lab Units 08/16/23  0455   FERRITIN ng/mL 168

## 2023-08-21 NOTE — ASSESSMENT & PLAN NOTE
· Patient has been on Eliquis which has been on hold as patient had significant bleeding from the breast after placement of drain. Hemoglobin initially dropped slightly but has been stable. Will resume Eliquis  · Continue metoprolol  · Patient had couple of episodes of atrial fibrillation with rapid ventricular late on the night of 8/18/2023 needing as needed IV Lopressor  · Metoprolol dose increased to 50 mg p.o. twice daily. Patient currently in sinus rhythm.

## 2023-08-21 NOTE — PROGRESS NOTES
NEPHROLOGY PROGRESS NOTE   Keyla Murphy 72 y.o. female MRN: [id number]  Unit/Bed#: [address] [place] [address] Encounter: [id number]  Reason for Consult: SEBASTIAN    ASSESSMENT AND PLAN:  72 woman with PMH of yo A-fib, diabetes, CKD G4, breast cancer, KATRINA p/w AMS found to be septic. Nephrology is consulted for management of SEBASTIAN    PLAN:    #Non-Oliguric KDIGO SEBASTIAN stage 1 on CKd G4  • Etiology: Likely secondary to ATN in the settings of sepsis  • Baseline creatinine 1.5 to 1.9 mg/dL  • Current creatinine: 2.38 mg/dL, fluctuating  • Peak creatinine: 2.9 mg/dL  • UA: No hematuria, no leukocyturia  • Renal imaging : No hydronephrosis  • Treatment:  • No indication of dialysis at this time  • Maintain MAP:  Over 65 mmHg if possible/avoid hypoperfusion:  Hold parameters on blood pressure medications  • Avoid nephrotoxic agents such as NSAIDs, and IV contrast if possible. Avoid opioids   • Adjust medications to GFR  • UPCR    #CKD G4  • Baseline creatinine: 1.5 to 1.9 mg/dL  • Etiology:Likely secondary to nephrosclerosis in the settings of cardiovascular disease and diabetic glomerulopathy      #Acid-base Disorder  • serum HCO3 28 mmol/L  • Elevated bicarb likely secondary to hypercapnia in the settings of KATRINA    #Volume status/hypertension:  • Volume: Slightly fluid overload with trace lower extremity edema  • Blood pressure: Normotensive, /56mmhg   • Recommend:  • Low-sodium diet  • Restart torsemide at 10 mg (home dose 20)  • Metoprolol 50 mg twice daily      #Anemia:  • Current hemoglobin: 8.1 mg/dL  • iron panel  o Iron 16  o Saturation 5  • Treatment:  • Recommend IV iron once infection is cleared  • Transfuse for hemoglobin less than 7.0 per primary service      #Sepsis  · Currently on ceftriaxone  · Management as per primary team  · On vancomycin    Discussed with Dr. Jorge Alberto Cornejo kidney function fluctuating with trace lower extremity edema. We agreed to restart torsemide      SUBJECTIVE:  Patient seen and examined at bedside. No chest pain, shortness of breath, nausea, vomiting, abdominal pain or diarrhea. No urinary complaints.        OBJECTIVE:  Current Weight: Weight - Scale: 119 kg (262 lb 3.2 oz)  Vitals:    08/21/23 0759   BP: 130/56   Pulse: 60   Resp: 20   Temp: 97.5 °F (36.4 °C)   SpO2: 98%       Intake/Output Summary (Last 24 hours) at 8/21/2023 1021  Last data filed at 8/21/2023 0655  Gross per 24 hour   Intake 130 ml   Output 717.5 ml   Net -587.5 ml     Wt Readings from Last 3 Encounters:   08/21/23 119 kg (262 lb 3.2 oz)   08/17/23 129 kg (284 lb 6.3 oz)   08/02/23 119 kg (263 lb 3.2 oz)     Temp Readings from Last 3 Encounters:   08/21/23 97.5 °F (36.4 °C)   08/02/23 (!) 97.2 °F (36.2 °C) (Tympanic)   08/02/23 97.9 °F (36.6 °C) (Temporal)     BP Readings from Last 3 Encounters:   08/21/23 130/56   08/02/23 126/66   08/02/23 130/88     Pulse Readings from Last 3 Encounters:   08/21/23 60   08/02/23 63   08/02/23 79      General:  no acute distress at this time  Skin:  No acute rash  Eyes:  No scleral icterus and noninjected  ENT:  mucous membranes moist  Neck:  no carotid bruits  Chest:  Clear to auscultation percussion, good respiratory effort, no use of accessory respiratory muscles  CVS:  Regular rate and rhythm without rub   Abdomen:  soft and nontender   Extremities: trace lower extremity edema  Neuro:  No gross focality  Psych:  Alert , cooperative     Medications:    Current Facility-Administered Medications:   •  acetaminophen (TYLENOL) tablet 650 mg, 650 mg, Oral, Q6H PRN, Georgi Dye MD, 650 mg at 08/20/23 1348  •  ammonium lactate (LAC-HYDRIN) 12 % lotion 1 Application, 1 Application, Topical, BID PRN, Gloria Abbott MD, 1 Application at 98/76/62 2493  •  aspirin chewable tablet 81 mg, 81 mg, Oral, Daily, Georgi Dye MD, 81 mg at 08/21/23 0841  •  atorvastatin (LIPITOR) tablet 40 mg, 40 mg, Oral, Daily With Gómez Spencer MD, 40 mg at 08/20/23 1700  •  bisacodyl (DULCOLAX) rectal suppository 10 mg, 10 mg, Rectal, Daily PRN, Lucero Hutson MD, 10 mg at 08/19/23 1235  •  fluticasone (FLONASE) 50 mcg/act nasal spray 1 spray, 1 spray, Nasal, Daily, Lucero Hutson MD, 1 spray at 08/21/23 0842  •  Fluticasone Furoate-Vilanterol 100-25 mcg/actuation 1 puff, 1 puff, Inhalation, Daily, Lucero Hutson MD, 1 puff at 08/21/23 0842  •  insulin glargine (LANTUS) subcutaneous injection 25 Units 0.25 mL, 25 Units, Subcutaneous, HS, Lucero Hutson MD, 25 Units at 08/20/23 2224  •  insulin lispro (HumaLOG) 100 units/mL subcutaneous injection 1-5 Units, 1-5 Units, Subcutaneous, TID AC, 1 Units at 08/21/23 0841 **AND** Fingerstick Glucose (POCT), , , TID AC, Lucero Hutson MD  •  insulin lispro (HumaLOG) 100 units/mL subcutaneous injection 1-5 Units, 1-5 Units, Subcutaneous, HS, Lucero Hutson MD, 1 Units at 08/20/23 2225  •  insulin lispro (HumaLOG) 100 units/mL subcutaneous injection 1-5 Units, 1-5 Units, Subcutaneous, 0200, Lucero Hutson MD, 1 Units at 08/21/23 0202  •  insulin lispro (HumaLOG) 100 units/mL subcutaneous injection 5 Units, 5 Units, Subcutaneous, TID With Meals, Lucero Hutson MD, 5 Units at 08/21/23 0842  •  metoprolol (LOPRESSOR) injection 5 mg, 5 mg, Intravenous, Q6H PRN, Lucero Hutson MD, 5 mg at 08/19/23 0412  •  metoprolol tartrate (LOPRESSOR) tablet 50 mg, 50 mg, Oral, Q12H [address], Lucero Hutson MD, 50 mg at 08/21/23 0841  •  montelukast (SINGULAIR) tablet 10 mg, 10 mg, Oral, HS, Lucero Hutson MD, 10 mg at 08/20/23 2224  •  nystatin (MYCOSTATIN) powder, , Topical, BID, Lucero Hutson MD, Given at 08/21/23 1002  •  ondansetron (ZOFRAN) injection 4 mg, 4 mg, Intravenous, Q6H PRN, Lucero Hutson MD, 4 mg at 08/19/23 1547  •  pantoprazole (PROTONIX) EC tablet 40 mg, 40 mg, Oral, Daily, Lucero Hutson MD, 40 mg at 08/21/23 0841  •  polyethylene glycol (MIRALAX) packet 17 g, 17 g, Oral, Daily, Lucero Hutson MD, 17 g at 08/21/23 1001  •  saccharomyces boulardii (FLORASTOR) capsule 250 mg, 250 mg, Oral, BID, Georgi Dye MD, 250 mg at 08/21/23 0841  •  senna (SENOKOT) tablet 17.2 mg, 2 tablet, Oral, HS, Faith Webber MD, 17.2 mg at 08/20/23 2224  •  torsemide (DEMADEX) tablet 20 mg, 20 mg, Oral, Daily, Steve Guallpa MD  •  vancomycin (VANCOCIN) IVPB (premix in dextrose) 1,000 mg 200 mL, 12.5 mg/kg (Adjusted), Intravenous, Daily PRN, Sussy Suazo PA-C    Laboratory Results:  Results from last 7 days   Lab Units 08/21/23  0606 08/20/23  1600 08/20/23  0511 08/19/23  0457 08/18/23  1219 08/18/23  0543 08/17/23  0809 08/17/23  0547 08/16/23  1322 08/16/23  0455   WBC Thousand/uL 9.75  --  10.96*  --  14.95* 14.96*  --  14.72* 13.31* 16.06*   HEMOGLOBIN g/dL 8.1* 8.0* 8.1*  --  10.3* 10.5*  --  10.1* 10.0* 10.3*   HEMATOCRIT % 28.0* 26.9* 28.4*  --  33.5* 34.3*  --  32.1* 31.4* 32.6*   PLATELETS Thousands/uL 294  --  295  --  250 260  --  184 185 173   SODIUM mmol/L 142  --  146 147  --  143 139  --  139 140   POTASSIUM mmol/L 4.8  --  5.4* 4.3  --  4.8 4.9  --  4.7 5.5*   CHLORIDE mmol/L 110*  --  112* 117*  --  109* 106  --  103 104   CO2 mmol/L 28  --  29 24  --  26 26  --  28 28   BUN mg/dL 72*  --  67* 53*  --  58* 65*  --  67* 65*   CREATININE mg/dL 2.38*  --  2.22* 1.62*  --  1.88* 2.39*  --  2.71* 2.70*   CALCIUM mg/dL 8.7  --  9.0 8.2*  --  9.3 8.6  --  8.8 8.5   MAGNESIUM mg/dL  --   --   --  2.2  --  2.3 2.0  --   --   --    PHOSPHORUS mg/dL  --   --   --  3.3  --   --  4.4*  --   --   --        IR drainage tube placement   Final Result by Parris Mora MD (08/18 6592)   Impression:   Successful placement of a 10 Montserratian catheter into a right breast abscess under ultrasound control, and 160 mL of tan purulent fluid was aspirated and a specimen sent to the lab as described above. PLAN: Tube to ELIESER bulb suction. Flush with 10 cc of normal saline solution q. day. Return in 2 weeks for tube check. Workstation performed: [id number]         MRI brain w wo contrast   Final Result by Christi Houser MD (08/17 2115)   Motion degraded examination. 1. No acute infarction, edema, or pathologic intra-axial enhancement. 2. Mild chronic microangiopathic ischemic changes. Workstation performed: [id number]         MRA carotids wo and w contrast   Final Result by Christi Houser MD (08/17 2121)      There is no evidence for a hemodynamically significant extracranial carotid stenosis. The cervical vertebral arteries are patent. Workstation performed: [id number]         MRA head wo contrast   Final Result by Christi Houser MD (08/17 2118)      Cerebral MRA demonstrates no large vessel intracranial occlusion or focal stenosis. Workstation performed: [id number]         CT stroke alert brain   Final Result by Aiyana June DO (08/17 2242)      No acute intracranial abnormality. Chronic microangiopathic changes. Findings were directly discussed with Orlando Alford  at 8:37 a.m. on 8/17/2023. Workstation performed: [id number]         CT chest abdomen pelvis wo contrast   Final Result by Noemi Braxton MD (47/55 7525)      CT chest:      New trace bilateral pleural effusions and small pericardial effusion. Findings may be sequela of sepsis, third spacing, or pulmonary vascular congestion congestion. Trace bilateral lower lobe dependent subsegmental atelectasis slightly increased likely due to new pleural effusions. No airspace disease to suggest pneumonia. Incidental partially visualized right breast loculated fluid collection measuring at least 11 cm. Given history of right breast surgery, this may represent chronic seroma or hematoma. Additional chronic findings and negatives as above. CT abdomen and pelvis:      No evidence of acute abdominopelvic process. Colonic diverticulosis.       Additional chronic findings and negatives as above.      This study demonstrates a significant  finding and was documented as such in [facility] for liaison and referring practitioner notification. Workstation performed: [id number]         XR chest portable   Final Result by Harris Reynaga MD (08/17 1515)      Trace bilateral pleural effusions with subjacent atelectasis. Workstation performed: [id number]         US kidney and bladder   Final Result by Awa Guillen DO (08/15 1617)      Unremarkable kidneys without evidence of hydronephrosis. Workstation performed: [id number]         CT chest wo contrast   Final Result by Nhan Lewis MD (08/14 1151)      No definite pneumonia. Mild septal thickening suggestive of interstitial edema. Dependent atelectasis in the lower lobes. Several subcentimeter nodules, stable since March 2023, likely stable since March 2022 although that exam is compromised by motion. Workstation performed: [id number]         XR chest portable   Final Result by Brissa Keller MD (08/11 1402)      Right base infiltrate and/or atelectasis                  Workstation performed: [id number]         IR drainage tube check/change/reposition/reinsertion/upsize    (Results Pending)   IR drainage tube check/change/reposition/reinsertion/upsize    (Results Pending)       Portions of the record may have been created with voice recognition software. Occasional wrong word or "sound a like" substitutions may have occurred due to the inherent limitations of voice recognition software. Read the chart carefully and recognize, using context, where substitutions have occurred.

## 2023-08-21 NOTE — CASE MANAGEMENT
Case Management Discharge Planning Note    Patient name Haley Rodriguez  Location [address] [facility] 405[address] [place] 12-* MRN [id number]  : 1950 Date 2023       Current Admission Date: 2023  Current Admission Diagnosis:Septic shock [facility])   Patient Active Problem List    Diagnosis Date Noted   • Cerebrovascular accident (CVA) ([address])    • Bacteriuria 2023   • Breast abscess of female 2023   • Ductal carcinoma in situ (DCIS) of right breast 2023   • Intraductal papilloma of right breast 05/15/2023   • Abnormal mammogram 2023   • Allergies 2023   • Fall 2023   • Sepsis due to pneumonia ([address]) 2023   • Syncope 2023   • Type 2 diabetes mellitus with hyperglycemia, without long-term current use of insulin ([address]) 2023   • Impaired mobility and ADLs 2022   • Pressure injury of sacral region, stage 2 ([address]) 2022   • Rash 2022   • PSVT (paroxysmal supraventricular tachycardia) ([address]) 2022   • Chronic respiratory failure with hypoxia and hypercapnia ([address]) 2022   • Platelets decreased ([address]) 2022   • KATRINA (obstructive sleep apnea) 2022   • Obesity hypoventilation syndrome ([address]) 2022   • Toxic metabolic encephalopathy    • Mass overlapping multiple quadrants of right breast 2022   • Pressure injury of deep tissue of sacral region 2022   • Multiple pulmonary nodules determined by computed tomography of lung 2022   • Chronic diastolic heart failure ([address]) 2022   • Septic shock ([address]) 2022   • Peripheral venous insufficiency 2022   • Raynaud's disease 2022   • Lung nodule 2022   • Acute kidney injury superimposed on chronic kidney disease ([address])    • Status post reverse total replacement of left shoulder 2022   • Constipation 2022   • PONV (postoperative nausea and vomiting) 2022   • Diabetes mellitus, type 2 ([address]) 2022   • Gastroesophageal reflux disease 02/28/2022   • Nephrolithiasis 02/28/2022   • Arthritis 02/28/2022   • S/P total knee replacement, right 02/28/2022   • On continuous oral anticoagulation - eliquis 02/28/2022   • Essential hypertension 08/01/2019   • Anemia 07/31/2019   • Mixed hyperlipidemia 06/25/2019   • Paroxysmal atrial fibrillation ([address]) 05/28/2019   • Lower leg edema 05/27/2019   • Moderate persistent asthma without complication 24/56/5452   • Morbid obesity with BMI of 45.0-49.9, adult ([address]) 03/08/2018      LOS (days): 10  Geometric Mean LOS (GMLOS) (days): 9.60  Days to GMLOS:-0.5     OBJECTIVE:  Risk of Unplanned Readmission Score: 35.8       Current admission status: Inpatient   Preferred Pharmacy:   [facility] #[id number] [facility], [address] ([address])  [address] ([address])  [address] [facility] [address]  Phone: [phone] Fax: [phone]    Primary Care Provider: Cooper De La Paz MD    Primary Insurance: MEDICARE  Secondary Insurance: [employer]    DISCHARGE DETAILS:    Discharge planning discussed with[de-identified] Patient and sister Hector Vo of Choice: Yes     Comments - Freedom of Choice: SW spoke by phone with Sarah Najjar in admissions at [facility] regarding patient's preference for [facility] for [employer]. Patient and family are in agreement that patient will not be undergoing radiation treatments in the near future and [facility] is now able to accept patient. SANDRA reserved facility in [place] and notified patient at bedside and sister Brennen Sim by phone. SW will continue to follow.        CM contacted family/caregiver?: Yes  Were Treatment Team discharge recommendations reviewed with patient/caregiver?: Yes  Did patient/caregiver verbalize understanding of patient care needs?: N/A- going to facility  Were patient/caregiver advised of the risks associated with not following Treatment Team discharge recommendations?: Yes    Contacts  Patient Contacts: Sister Brennen Sim  Relationship to Patient[de-identified] Family  Contact Method: Phone  Phone Number: [phone]  Reason/Outcome: Emergency Contact, Discharge [address]         Is the patient interested in [address] [facility] [place] at discharge?: No    DME Referral Provided  Referral made for DME?: No    Other Referral/Resources/Interventions Provided:  Interventions: Short Term Rehab    Would you like to participate in our [employer] service program?  : No - Declined    Treatment Team Recommendation: Short Term Rehab  Discharge Destination Plan[de-identified] Short Term Rehab  Transport at Discharge : BLS Ambulance         IMM Given (Date):: 08/21/23  IMM Given to[de-identified] Patient (IMM reviewed with and signed by patient.   Copy given to patient and copy placed in scan bin for chart.)

## 2023-08-21 NOTE — ASSESSMENT & PLAN NOTE
· Initially held metoprolol and Demadex  · Blood pressure improved, metoprolol resumed  · Patient appeared volume overloaded with +4 pitting edema bilateral lower extremities, received one-time dose of Lasix 20 mg IV on 8/12, 40 mg on 8/15 and 8/16 with improved creatinine  · Later Demadex and Lasix were discontinued as patient became hypotensive on 8/16/2023. Patient received IV fluids on 8/16/2023 which were later discontinued. · Blood pressure is much better. Metoprolol dose was increased to 50 mg p.o. twice daily.     · Patient to be restarted back on torsemide 10 mg p.o. daily

## 2023-08-21 NOTE — PROGRESS NOTES
-- Patient: Annie Davenport  -- MRN: [id number]  -- Aidin Request ID: [id number]  -- Level of care reserved: [address]  -- Partner Reserved: [address] [place] [address], [facility], [address] [place] [address] Drive [phone]  -- Clinical needs requested:  -- Geography searched: 20 miles around [facility]  -- Start of Service:  -- Request sent: 12:30pm EDT on 8/14/2023 by Livia Kendall  -- Partner reserved: 4:44pm EDT on 8/21/2023 by Livia Kendall  -- Choice list shared: 1:00pm EDT on 8/21/2023 by Livia Kendall

## 2023-08-21 NOTE — ASSESSMENT & PLAN NOTE
Lab Results   Component Value Date    EGFR 19 08/21/2023    EGFR 21 08/20/2023    EGFR 31 08/19/2023    CREATININE 2.38 (H) 08/21/2023    CREATININE 2.22 (H) 08/20/2023    CREATININE 1.62 (H) 08/19/2023   · Suspected secondary to ATN in the setting of infection with hypotension and also possible contribution from cardio renal syndrome  · Creatinine baseline is 1.5-1.8  · Creatinine upon admission was 2.9   · Renal ultrasound showed no evidence of hydronephrosis  · Patient initially received few dose of IV Lasix which were later discontinued and patient received IV fluids and patient became hypotensive on 8/16/2023 following which IV fluids have been on hold  · Creatinine has creased to 1.6 but has again increased to 2.2-2.3. Diuretics have been on hold for the past few days  · Nephrology has been following  · Echo cardiogram shows EF 55%  · Patient to be restarted back on Demadex 10 mg p.o. daily with a follow-up BMP in a.m.

## 2023-08-21 NOTE — PLAN OF CARE
Problem: OCCUPATIONAL THERAPY ADULT  Goal: Performs self-care activities at highest level of function for planned discharge setting. See evaluation for individualized goals. Description: Treatment Interventions: ADL retraining, Functional transfer training, UE strengthening/ROM, Endurance training, Patient/family training, Equipment evaluation/education, Compensatory technique education, Continued evaluation, Energy conservation, Activityengagement          See flowsheet documentation for full assessment, interventions and recommendations. Outcome: Progressing  Note: Limitation: Decreased ADL status, Decreased UE strength, Decreased endurance, Decreased self-care trans, Decreased high-level ADLs, Decreased UE ROM, Decreased cognition  Prognosis: Good  Assessment: Pt seen for OT re-eval due to change in medical status and to update plan of care. Pt admitted on 8/11/23 and transferred to ICU on 8/16/23; stroke alert called. Pt is s/p IR drainage tube placement into R breast abscess on 8/18/23. Downgraded to med surg on 8/18/23. Please refer to initial Ot eval (08/13/23) for detailed PMH and social history. Upon re-eval, pt alert and oriented to person, place, and time. Pt able to follow directions. Pt required max A x1 to complete bed mobility, mod <> min A sit to stand, min A using RW to take few lateral side steps using RW, max A LBD, mod A UBD, min A using RW to complete toilet transfer to commode. Pt completing ADLs below baseline level of I and would benefit from continued OT in acute care to max functional independence. Recommend post acute rehab when medically stable.  Will continue to follow     OT Discharge Recommendation: Post acute rehabilitation services

## 2023-08-21 NOTE — WOUND OSTOMY CARE
Progress Note - Wound   Laurita Rader 72 y.o. female MRN: [id number]  Unit/Bed#: [address] [place] [address] Encounter: [id number]       Assessment: This is a follow up visit for this 72year old female patient admitted on 8/11/23 with sepsis/pneumonia. She has a history of NIDDM 2, ductal carcinoma in situ of right breast,   CKD 3, HTN PAT, morbid obesity and chronic fungal rashes to skin folds. She was AAO x 3 with family at bedside and was agreeable to have wound visit done. She had Purewick in place with episodes of urinary and fecal incontinence and is dependent upon nursing staff/family for all of her care.     Assessment Findings:  1-MASD to bilateral breast, abdomen and groin has improved with area of partial thickness skin denudement to left breast and moderate yellow/serous drainage. Orders in place for skin care. 2-Full thickness wounds to bilateral sacrobuttocks due to patient scratching, friction/shear and moisture. Orders in place for wound care and for prevention. 3-Pressure injury to mid-sacrum has healed - orders in place for wound care and for prevention. 4-Bilateral heels intact - orders in place for skin care and for prevention.     Plan:   Skin care plans:   1-Calazime to bilateral/mid sacrum and buttocks BID and PRN  2-Apply Hydraguard to left anterior lower leg and heels BID & PRN. 3-Cleanse skin folds to bilateral breasts, bilateral/mid abdomen and bilateral groin with foaming cleanser & pat dry. Apply light dusting of Nystatin powder 2x/day & gently remove excess. 4-Float heels on 2 pillows to offload pressure so heels are not in contact with mattress or pillows. 5-Ehob pressure redistribution cushion in chair when out of bed. Limit sitting for 1-2 hours at a time due to sacrobuttock wounds then offload back in bed turning side to side every 2 hours.   6-Moisturize skin daily with skin nourishing cream.  7-Turn/reposition q2h or when medically stable for pressure re-distribution on skin.     Wound 05/30/23 Breast Right (Active)   Wound Description Pink;granulation tissue 08/21/23 1352   Wound Length (cm) 0 cm 08/21/23 1352   Wound Width (cm) 0 cm 08/21/23 1352   Wound Depth (cm) 0 cm 08/21/23 1352   Wound Surface Area (cm^2) 0 cm^2 08/21/23 1352   Wound Volume (cm^3) 0 cm^3 08/21/23 1352   Calculated Wound Volume (cm^3) 0 cm^3 08/21/23 1352   Drainage Amount None 08/21/23 1352   Treatments Cleansed 08/21/23 1352   Dressing Nystatin powder 08/21/23 1352   Dressing Changed New 08/21/23 1352   Dressing Status Intact 08/21/23 1352       Wound 08/14/23 Abrasion(s) Buttocks Right (Active)   Wound Image   08/21/23 1409   Wound Description Granulation tissue;Pink;slough;yellow 08/21/23 1409   Marlene-wound Assessment Intact 08/21/23 1409   Wound Length (cm) 0.4 cm 08/21/23 1409   Wound Width (cm) 0.6 cm 08/21/23 1409   Wound Depth (cm) 0.1 cm 08/21/23 1409   Wound Surface Area (cm^2) 0.24 cm^2 08/21/23 1409   Wound Volume (cm^3) 0.024 cm^3 08/21/23 1409   Calculated Wound Volume (cm^3) 0.02 cm^3 08/21/23 1409   Change in Wound Size % 98 08/21/23 1409   Drainage Amount None 08/21/23 1409   Non-staged Wound Description Full thickness 08/21/23 1409   Treatments Cleansed 08/21/23 1409   Dressing Moisture barrier 08/21/23 1409   Wound packed?  No 08/21/23 1409   Dressing Changed New 08/21/23 1409   Dressing Status Intact 08/21/23 1409       Wound 08/14/23 Abrasion(s) Buttocks Left (Active)        Wound Description Granulation tissue;Pink;slough;yellow 08/21/23 1409   Marlene-wound Assessment Hyperpigmented 08/21/23 1409   Wound Length (cm) 0.6 cm 08/21/23 1409   Wound Width (cm) 1.4 cm 08/21/23 1409   Wound Depth (cm) 0.1 cm 08/21/23 1409   Wound Surface Area (cm^2) 0.84 cm^2 08/21/23 1409   Wound Volume (cm^3) 0.084 cm^3 08/21/23 1409   Calculated Wound Volume (cm^3) 0.08 cm^3 08/21/23 1409   Drainage Amount None 08/21/23 1409   Non-staged Wound Description Full thickness 08/21/23 1409   Treatments Cleansed 08/21/23 1409 Dressing Moisture barrier 08/21/23 1409   Dressing Changed New 08/21/23 1409   Dressing Status Intact 08/21/23 1409       Wound 08/14/23 MASD Breast Left (Active)   Wound Image   08/21/23 1352   Wound Description Granulation tissue;[place] 08/21/23 1352   Wound Length (cm) 0.1 cm 08/21/23 1352   Wound Width (cm) 4 cm 08/21/23 1352   Wound Depth (cm) 0.1 cm 08/21/23 1352   Wound Surface Area (cm^2) 0.4 cm^2 08/21/23 1352   Wound Volume (cm^3) 0.04 cm^3 08/21/23 1352   Calculated Wound Volume (cm^3) 0.04 cm^3 08/21/23 1352   Drainage Amount Moderate;serous;yellow 08/21/23 1352   Non-staged Wound Description Partial thickness 08/21/23 1352   Treatments Cleansed 08/21/23 1352   Dressing Nystatin powder 08/21/23 1352   Dressing Changed New 08/21/23 1352   Dressing Status Intact 08/21/23 1352     Discussed assessment findings, and plan of care/recommendations with Brendon Anaya RN.     Wound care will follow along with patient throughout admission, please call or tiger text with questions and concerns.     Recommendations written as orders.   Kaci PEDROZA, RN, [employer]

## 2023-08-21 NOTE — PROGRESS NOTES
[id number] [facility]  Progress Note  Name: Ellie Aguillon  MRN: [id number]  Unit/Bed#: 913 [facility] 405-01 I Date of Admission: 8/11/2023   Date of Service: 8/21/2023 I Hospital Day: 10    Assessment/Plan   * Septic shock [facility])  Assessment & Plan  · As evidenced by fever, tachycardia, tachypnea, elevated white count  · Initially patient presumed to have pneumonia. Chest x-ray on 8/11/2023 concerning for right lower lobe infiltrate. CT chest on 8/14/2023 with bibasilar atelectasis. Repeat chest x-ray showed bibasilar ectasis versus infiltrates  · Patient initially had been placed on ceftriaxone, broadened antibiotics to cefepime on 8/15 secondary to fever spike. Patient had another fever spike with hypotensive and tachycardia on 8/16/2023 and patient received 30 mill per KG fluid bolus and was transferred to ICU  · CT chest abdomen pelvis rule out occult source of infection was ordered on 8/16/2023 which showed new trace bilateral pleural effusions with small pericardial effusion with bilateral lower lobe dependent atelectasis with 11 cm right breast loculated fluid collection with colonic diverticulosis  · Procalcitonin level is minimally elevated  · MRSA surveillance was positive  · Patient was later changed to cefepime and vancomycin. Cefepime was later discontinued due to concern for encephalopathy  · Blood cultures from admission from 8/11/2023 have been negative. Repeat blood cultures from 8/16/2023 have also been negative.   · Body fluid culture from the breast with no growth  · Continue vancomycin with pharmacy consultation for dosing  · ID input appreciated      Toxic metabolic encephalopathy  Assessment & Plan  · Toxic metabolic encephalopathy secondary to sepsis/SEBASTIAN with contribution from cefepime  · On morning of 8/16 patient's mentation noted to be slowed, patient reported feeling foggy  · ABG performed pH 7.392, PCO2 45.9, PO2 74.6 and bicarb 26.0  · Patient also was hypotensive and received fluid bolus  · Patient later also had some expressive/receptive aphasia with some mild upper extremity weakness on 8/17/2023. Stroke alert was called and patient had a CAT scan of the brain which was negative for any acute findings. CTA could not be performed due to CKD. · MRI of the brain showed no acute infarction edema or pathological enhancement  · MRA head showed no large vessel occlusion or focal stenosis. · MRA of the carotids without any hemodynamically significant stenosis  · HemoGlobin A1c was 9.2  · Felt to be secondary to sepsis and acute kidney injury. Continue Eliquis and statin as per neurology. · Mental status has improved and is close to baseline. Breast abscess of female  Assessment & Plan  Patient noted to have edema with erythematous skin on the right lateral breast  CAT scan of the chest from 8/16/2023 with loculated fluid collection measuring 11 cm  IR was consulted-patient underwent drainage of 160 mL of purulent fluid from the right breast with placement of catheter. Postprocedure patient developed significant bleeding. Area was cauterized and thrombin gel injected to stop the bleeding and tube was upsized to a 12 [place] tube. Eliquis has been on hold. Breast drain with 7.5 Ml over the past 24 hours. Continue diet as per IR.     Acute kidney injury superimposed on chronic kidney disease [facility])  Assessment & Plan  Lab Results   Component Value Date    EGFR 19 08/21/2023    EGFR 21 08/20/2023    EGFR 31 08/19/2023    CREATININE 2.38 (H) 08/21/2023    CREATININE 2.22 (H) 08/20/2023    CREATININE 1.62 (H) 08/19/2023   · Suspected secondary to ATN in the setting of infection with hypotension and also possible contribution from cardio renal syndrome  · Creatinine baseline is 1.5-1.8  · Creatinine upon admission was 2.9   · Renal ultrasound showed no evidence of hydronephrosis  · Patient initially received few dose of IV Lasix which were later discontinued and patient received IV fluids and patient became hypotensive on 8/16/2023 following which IV fluids have been on hold  · Creatinine has creased to 1.6 but has again increased to 2.2-2.3. Diuretics have been on hold for the past few days  · Nephrology has been following  · Echo cardiogram shows EF 55%  · Patient to be restarted back on Demadex 10 mg p.o. daily with a follow-up BMP in a.m. Essential hypertension  Assessment & Plan  · Initially held metoprolol and Demadex  · Blood pressure improved, metoprolol resumed  · Patient appeared volume overloaded with +4 pitting edema bilateral lower extremities, received one-time dose of Lasix 20 mg IV on 8/12, 40 mg on 8/15 and 8/16 with improved creatinine  · Later Demadex and Lasix were discontinued as patient became hypotensive on 8/16/2023. Patient received IV fluids on 8/16/2023 which were later discontinued. · Blood pressure is much better. Metoprolol dose was increased to 50 mg p.o. twice daily. · Patient to be restarted back on torsemide 10 mg p.o. daily    Paroxysmal atrial fibrillation [facility])  Assessment & Plan  · Patient has been on Eliquis which has been on hold as patient had significant bleeding from the breast after placement of drain. Hemoglobin initially dropped slightly but has been stable. Will resume Eliquis  · Continue metoprolol  · Patient had couple of episodes of atrial fibrillation with rapid ventricular late on the night of 8/18/2023 needing as needed IV Lopressor  · Metoprolol dose increased to 50 mg p.o. twice daily. Patient currently in sinus rhythm. Anemia  Assessment & Plan  Baseline hemoglobin has been in the range of 10 which dropped to 8.1  No evidence of GI bleeding or blood in the urine  Likely secondary to acute blood loss bleeding from the breast after the placement of breast drain  Hemoglobin dropped slightly but has been stable in the 8 range.   Resume Eliquis with a follow-up hemoglobin    Moderate persistent asthma without complication  Assessment & Plan  Continue Breo Ellipta and Singulair    Morbid obesity with BMI of 45.0-49.9, adult ([address])  Assessment & Plan  · Dietary lifestyle modifications    Ductal carcinoma in situ (DCIS) of right breast  Assessment & Plan  · Diagnosed in March 2023  · Status postlumpectomy on 5/30/2023  · Radiation was to start 8/16 at [facility]  · Outpatient follow-up with oncology    Type 2 diabetes mellitus with hyperglycemia, without long-term current use of insulin [facility])  Assessment & Plan  Lab Results   Component Value Date    HGBA1C 9.2 (H) 08/18/2023       Recent Labs     08/20/23  2032 08/21/23  0154 08/21/23  0748 08/21/23  1120   POCGLU 216* 180* 153* 187*       Blood Sugar Average: Last 72 hrs:  (P) [id number]   · Continue 25 units of lantus insulin at bedtime, 5 units of Humalog 3 times daily with meals and insulin sliding scale  · Patient is on diabetic diet    Chronic respiratory failure with hypoxia and hypercapnia (HCC)  Assessment & Plan  · Patient wears oxygen at home at night with her CPAP but is not oxygen dependent during the day  · Keep O2 sats greater than 92%  · Currently stable on 2 L with O2 saturation in mid 90s. · Continue CPAP nightly  · Encourage incentive spirometry and pulmonary toilet    KATRINA (obstructive sleep apnea)  Assessment & Plan  · Continue CPAP 7 cm water hs    Constipation  Assessment & Plan  Patient did not have a bowel movement in 3 days and complaining of some abdominal discomfort and nausea   patient started MiraLAX 17 g p.o. daily and was given Dulcolax suppository without bowel movement  Patient also had on senna and is having good bowel movements. Mixed hyperlipidemia  Assessment & Plan  Continue Lipitor               VTE Pharmacologic Prophylaxis: VTE Score: 5 High Risk (Score >/= 5) - Pharmacological DVT Prophylaxis Ordered: apixaban (Eliquis). Sequential Compression Devices Ordered.     Patient Centered Rounds: I performed bedside rounds with nursing staff today. Discussions with Specialists or Other Care Team Provider: Nephrology, ID    Education and Discussions with Family / Patient: Updated  (sister) via phone. Total Time Spent on Date of Encounter in care of patient: 45 minutes This time was spent on one or more of the following: performing physical exam; counseling and coordination of care; obtaining or reviewing history; documenting in the medical record; reviewing/ordering tests, medications or procedures; communicating with other healthcare professionals and discussing with patient's family/caregivers. Current Length of Stay: 10 day(s)  Current Patient Status: Inpatient   Certification Statement: The patient will continue to require additional inpatient hospital stay due to Septic shock secondary to breast abscess, SEBASTIAN superimposed on CKD, anemia  Discharge Plan: Anticipate discharge in 24-48 hrs to rehab facility. Code Status: Level 1 - Full Code    Subjective:   Patient is feeling well. Denies any chest pain, abdominal pain, breast pain, shortness of breath. Objective:     Vitals:   Temp (24hrs), Av.8 °F (36.6 °C), Min:97.5 °F (36.4 °C), Max:98 °F (36.7 °C)    Temp:  [97.5 °F (36.4 °C)-98 °F (36.7 °C)] 97.5 °F (36.4 °C)  HR:  [60-74] 60  Resp:  [18-20] 20  BP: (117-151)/(52-64) 130/56  SpO2:  [96 %-98 %] 98 %  Body mass index is 47.96 kg/m². Input and Output Summary (last 24 hours): Intake/Output Summary (Last 24 hours) at 2023 1420  Last data filed at 2023 0806  Gross per 24 hour   Intake 250 ml   Output 717.5 ml   Net -467.5 ml       Physical Exam:   Physical Exam  Constitutional:       Appearance: Normal appearance. HENT:      Head: Normocephalic and atraumatic. Nose: Nose normal.      Mouth/Throat:      Mouth: Mucous membranes are moist.      Pharynx: Oropharynx is clear. Eyes:      Extraocular Movements: Extraocular movements intact.       Pupils: Pupils are equal, round, and reactive to light. Cardiovascular:      Rate and Rhythm: Normal rate and regular rhythm. Pulmonary:      Effort: Pulmonary effort is normal.      Breath sounds: Normal breath sounds. Abdominal:      General: Bowel sounds are normal. There is no distension. Palpations: Abdomen is soft. Tenderness: There is no abdominal tenderness. Musculoskeletal:         General: No swelling. Cervical back: Normal range of motion and neck supple. Right lower leg: Edema present. Left lower leg: Edema present. Comments: Bilateral +1 pedal edema   Skin:     General: Skin is warm and dry. Neurological:      General: No focal deficit present. Mental Status: She is alert. Additional Data:     Labs:  Results from last 7 days   Lab Units 08/21/23  0606 08/20/23  1600 08/20/23  0511   WBC Thousand/uL 9.75  --  10.96*   HEMOGLOBIN g/dL 8.1*   < > 8.1*   HEMATOCRIT % 28.0*   < > 28.4*   PLATELETS Thousands/uL 294  --  295   BANDS PCT % 4  --   --    NEUTROS PCT %  --   --  65   LYMPHS PCT %  --   --  17   LYMPHO PCT % 20  --   --    MONOS PCT %  --   --  11   MONO PCT % 7  --   --    EOS PCT % 7*  --  4    < > = values in this interval not displayed. Results from last 7 days   Lab Units 08/21/23  0606 08/17/23  0809 08/16/23  1322   SODIUM mmol/L 142   < > 139   POTASSIUM mmol/L 4.8   < > 4.7   CHLORIDE mmol/L 110*   < > 103   CO2 mmol/L 28   < > 28   BUN mg/dL 72*   < > 67*   CREATININE mg/dL 2.38*   < > 2.71*   ANION GAP mmol/L 4   < > 8   CALCIUM mg/dL 8.7   < > 8.8   ALBUMIN g/dL  --   --  3.2*   TOTAL BILIRUBIN mg/dL  --   --  0.41   ALK PHOS U/L  --   --  43   ALT U/L  --   --  8   AST U/L  --   --  9*   GLUCOSE RANDOM mg/dL 147*   < > 177*    < > = values in this interval not displayed.          Results from last 7 days   Lab Units 08/21/23  1120 08/21/23  4271 08/21/23  0154 08/20/23  2032 08/20/23  1548 08/20/23  1139 08/20/23  0720 08/20/23  0215 08/19/23  2134 08/19/23  1636 08/19/23  1102 08/19/23  0641   POC GLUCOSE mg/dl 187* 153* 180* 216* 241* 208* 147* 158* 169* 162* 184* 148*     Results from last 7 days   Lab Units 08/18/23  0543   HEMOGLOBIN A1C % 9.2*     Results from last 7 days   Lab Units 08/18/23  0543 08/17/23  0547 08/16/23  1322 08/15/23  0432   LACTIC ACID mmol/L  --   --  0.7  --    PROCALCITONIN ng/ml 0.40* 0.56* 0.62* 0.36*       Lines/Drains:  Invasive Devices     Peripheral Intravenous Line  Duration           Peripheral IV 08/18/23 Left;Ventral (anterior) Forearm 2 days          Drain  Duration           Abscess Drain Breast 2 days    External Urinary Catheter <1 day                  Telemetry:  Telemetry Orders (From admission, onward)             24 Hour Telemetry Monitoring  Continuous x 24 Hours (Telem)        Question:  Reason for 24 Hour Telemetry  Answer:  Arrhythmias requiring acute medical intervention / PPM or ICD malfunction                 Telemetry Reviewed: Normal Sinus Rhythm  Indication for Continued Telemetry Use: No indication for continued use. Will discontinue. Imaging: Reviewed radiology reports from this admission including: chest CT scan, abdominal/pelvic CT, CT head and MRI brain    Recent Cultures (last 7 days):   Results from last 7 days   Lab Units 08/18/23  1424 08/16/23  1349 08/16/23  1322   BLOOD CULTURE   --  No Growth After 4 Days. No Growth After 4 Days.    GRAM STAIN RESULT  1+ Polys  No bacteria seen  --   --    BODY FLUID CULTURE, STERILE  No growth  --   --        Last 24 Hours Medication List:   Current Facility-Administered Medications   Medication Dose Route Frequency Provider Last Rate   • acetaminophen  650 mg Oral Q6H PRN William Roach MD     • ammonium lactate  1 Application Topical BID PRN William Roach MD     • apixaban  5 mg Oral BID William Roach MD     • aspirin  81 mg Oral Daily William Roach MD     • atorvastatin  40 mg Oral Daily With Nena Peterson MD     • bisacodyl 10 mg Rectal Daily PRN Yenifer Moya MD     • fluticasone  1 spray Nasal Daily Yenifer Moya MD     • Fluticasone Furoate-Vilanterol  1 puff Inhalation Daily Yenifer Moya MD     • insulin glargine  25 Units Subcutaneous HS Yenifer Moya MD     • insulin lispro  1-5 Units Subcutaneous TID AC Yenifer Moya MD     • insulin lispro  1-5 Units Subcutaneous HS Yenifer Moya MD     • insulin lispro  1-5 Units Subcutaneous 0200 Yenifer Moya MD     • insulin lispro  5 Units Subcutaneous TID With Meals Yenifer Moya MD     • metoprolol  5 mg Intravenous Q6H PRN Yenifer Moya MD     • metoprolol tartrate  50 mg Oral Q12H [facility] Yenifer Moya MD     • montelukast  10 mg Oral HS Yenifer Moya MD     • nystatin   Topical BID Yenifer Moya MD     • ondansetron  4 mg Intravenous Q6H PRN Yenifer Moya MD     • pantoprazole  40 mg Oral Daily Yenifer Moya MD     • polyethylene glycol  17 g Oral Daily Yenifer Moya MD     • saccharomyces boulardii  250 mg Oral BID Yenifer Moya MD     • senna  2 tablet Oral HS Yenifer Moya MD     • torsemide  10 mg Oral Daily Gale Paul MD     • vancomycin  12.5 mg/kg (Adjusted) Intravenous Daily PRN Margie Stanley PA-C          Today, Patient Was Seen By: Yenifer Moay MD    **Please Note: This note may have been constructed using a voice recognition system. **

## 2023-08-21 NOTE — OCCUPATIONAL THERAPY NOTE
Occupational Therapy Evaluation     Patient Name: Mariella GUTIERREZX Date: 8/21/2023  Problem List  Principal Problem:    Septic shock [facility])  Active Problems:     Moderate persistent asthma without complication    Morbid obesity with BMI of 45.0-49.9, adult (HCC)    Paroxysmal atrial fibrillation (HCC)    Mixed hyperlipidemia    Anemia    Essential hypertension    Constipation    Acute kidney injury superimposed on chronic kidney disease (HCC)    Toxic metabolic encephalopathy    KATRINA (obstructive sleep apnea)    Chronic respiratory failure with hypoxia and hypercapnia (HCC)    Type 2 diabetes mellitus with hyperglycemia, without long-term current use of insulin (HCC)    Ductal carcinoma in situ (DCIS) of right breast    Breast abscess of female    Past Medical History  Past Medical History:   Diagnosis Date    SEBASTIAN (acute kidney injury) ([address]) 01/23/2023    Anemia     Asthma     Atrial fibrillation (HCC)     Chronic kidney disease     COVID-19 January 2022    Diabetes mellitus (HCC)     GERD (gastroesophageal reflux disease)     Hyperlipidemia     Hypertension     PONV (postoperative nausea and vomiting)     Sleep apnea     wear BIPAP    SVT (supraventricular tachycardia) ([address])      Past Surgical History  Past Surgical History:   Procedure Laterality Date    APPENDECTOMY      BREAST BIOPSY Right     years ago when she was 23    BREAST BIOPSY Right 03/13/2023    BREAST LUMPECTOMY Right 5/30/2023    Procedure: RIGHT BREAST KRIS  DIRECTED LUMPECTOMY - Nubia Mckeon;  Surgeon: Isabella Cha MD;  Location: [facility];  Service: Surgical Oncology    CYSTOSCOPY W/ LASER LITHOTRIPSY Left 07/12/2016    Procedure: CYSTOSCOPY URETEROSCOPY WITH LITHOTRIPSY HOLMIUM LASER, RETROGRADE PYELOGRAM AND INSERTION STENT URETERAL;  Surgeon: Cipriano Chu MD;  Location: [facility];  Service:     DILATION AND CURETTAGE OF UTERUS      IR DRAINAGE TUBE CHECK/CHANGE/REPOSITION/REINSERTION/UPSIZE  8/18/2023 IR DRAINAGE TUBE PLACEMENT  8/18/2023    IR THORACENTESIS  03/18/2022    JOINT REPLACEMENT      right knee    KNEE ARTHROPLASTY Right     MAMMO NEEDLE LOCALIZATION LEFT (ALL INC) EACH ADD Right 4/24/2023    MAMMO STEREOTACTIC BREAST BIOPSY RIGHT (ALL INC) Right 03/13/2023    High Risk Lesion    OK ARTHROPLASTY GLENOHUMERAL JOINT TOTAL SHOULDER Left 03/01/2022    Procedure: ARTHROPLASTY SHOULDER REVERSE;  Surgeon: Irina Castro MD;  Location: [facility];  Service: Orthopedics    OK CYSTO BLADDER W/URETERAL CATHETERIZATION Left 06/29/2016    Procedure: CYSTOSCOPY RETROGRADE PYELOGRAM WITH INSERTION STENT URETERAL, left;  Surgeon: Gisel Martinez MD;  Location: [facility];  Service: Urology    SHOULDER ARTHROTOMY Left            08/21/23 1557   OT Last Visit   OT Visit Date 08/21/23  (Monday)   Note Type   Note type Re-Evaluation  (and tx session 2286-1321)   Pain Assessment   Pain Assessment Tool 0-10  (Simultaneous filing. User may not have seen previous data.)   Pain Score No Pain  (upon arrival resting in bed)   Effect of Pain on Daily Activities limits activity tolerance and I w/ ADLs   Patient's Stated Pain Goal No pain   Hospital Pain Intervention(s) Repositioned; Ambulation/increased activity; Emotional support   Restrictions/Precautions   Weight Bearing Precautions Per Order No  (Simultaneous filing. User may not have seen previous data.)   Other Precautions Contact/isolation;Cognitive; Chair Alarm; Bed Alarm;Multiple lines;O2;Fall Risk;Pain  (R breast ELIESER drain  Simultaneous filing. User may not have seen previous data.)   Home Living   Type of [address] [place] [address] Dr One level;Performs ADLs on one level; Able to live on main level with bedroom/bathroom   Bathroom Shower/Tub Tub/shower unit   Bathroom Toilet Standard   Bathroom Equipment Shower chair   Bathroom Accessibility Accessible   Home Equipment Walker;Cane;Wheelchair-manual   Additional Comments Pt seen for OT re-eval to update plan of care due to change in medical status. Please refer to initial OT eval (08/13/23) for detailed social history and PMH   Prior Function   Level of [place] Independent with functional mobility; Needs assistance with IADLS;Needs assistance with ADLs   Lives With Family; Other (Comment)  (niKirk belle and her daughters)   Akua Gaucher Help From Family   IADLs Family/Friend/Other provides transportation; Family/Friend/Other provides meals; Family/Friend/Other provides medication management   Falls in the last 6 months 0   Vocational Retired   Comments Pt reports I w/ UBD, grooming. Needs assistance for LBD and bathing. Lifestyle   Autonomy Pt reports supportive niece assist w/ bathing and LBD. 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 MRI impression: No acute infarction, edema, or pathologic intra-axial enhancement   Family/Caregiver Present No   Additional General Comments s/p IR R breast drain placement on 8/18/23   Subjective   Subjective "I will try"   ADL   Where Assessed Edge of bed   Eating Assistance 6  Modified independent   Eating Deficit Setup; Increased time to complete  (demo UE AROM / strength to complete)   Grooming Assistance 4  Minimal Assistance   Grooming Deficit Setup;Verbal cueing;Supervision/safety; Increased time to complete   UB Bathing Assistance 3  Moderate Assistance   UB Bathing Deficit Setup;Verbal cueing;Supervision/safety; Increased time to complete  (seated at EOB)   LB Bathing Assistance Unable to assess   UB Dressing Assistance 3  Moderate Assistance   UB Dressing Deficit Setup;Supervision/safety;Verbal cueing; Increased time to complete   LB Dressing Assistance 2  Maximal Assistance   LB Dressing Deficit Don/doff R sock; Don/doff L sock; Setup;Steadying Toileting Assistance  Unable to assess   Additional Comments on acute O2 via NC   Bed Mobility   Supine to Sit 2  Maximal assistance   Additional items Assist x 1; Increased time required;Verbal cues;LE management;HOB elevated; Bedrails  (to pt's R)   Sit to Supine Unable to assess   Additional Comments Pt seated on commode post re-eval w/ needs met attempting tovoid  (Simultaneous filing. User may not have seen previous data.)   Transfers   Sit to Stand 3  Moderate assistance  (mod A x1 initially and progressed to min A X1)   Additional items Assist x 1; Increased time required;Verbal cues; Bedrails;Armrests  (instruction for hand placement)   Stand to Sit 3  Moderate assistance   Additional items Assist x 1; Increased time required;Verbal cues; Bedrails;Armrests   Toilet transfer 4  Minimal assistance   Additional items Assist x 1; Increased time required;Verbal cues; Commode;Bedrails;Armrests  (hand placement)   Additional Comments Pt performed sit <> stand 2 X from EOB. Initially required mod A x1 and progressed to min A x1   Functional Mobility   Additional items Rolling walker   Balance   Static Sitting Fair   Static Standing Poor +   Activity Tolerance   Activity Tolerance Patient limited by fatigue;Patient limited by pain  (pt reports nausea and dizziness upon sitting at EOB)   Medical Staff Made Aware care coordination w/ PTTelma for portion of session due to physical assistance required, decreased activity tolerance, regression from baseline   Nurse Made Aware spoke w/ Shant JORGE   RUE Assessment   RUE Assessment   (limited end / terminal ROM due to pain, ELIESER drain in R breast)   RUE Strength   RUE Overall Strength Deficits;Due to pain  (able to participate in ADLs)   LUE Assessment   LUE Assessment   (premorbid limited shoulder flexion; able to participate in ADLs)   LUE Strength   LUE Overall Strength Deficits; Within Functional Limits - able to perform ADL tasks with strength; Other (Comment)  (shoulder deficits)   Hand Function   Gross Motor Coordination Functional   Fine Motor Coordination Functional   Cognition   Arousal/Participation Cooperative   Orientation Level Oriented to person;Oriented to time;Oriented to place   Memory Decreased recall of recent events   Following Commands Follows multistep commands with increased time or repetition   Assessment   Limitation Decreased ADL status; Decreased UE strength;Decreased endurance;Decreased self-care trans;Decreased high-level ADLs; Decreased UE ROM; Decreased cognition   Assessment Pt seen for OT re-eval due to change in medical status and to update plan of care. Pt admitted on 8/11/23 and transferred to ICU on 8/16/23; stroke alert called. Pt is s/p IR drainage tube placement into R breast abscess on 8/18/23. Downgraded to med surg on 8/18/23. Please refer to initial Ot eval (08/13/23) for detailed PMH and social history. Upon re-eval, pt alert and oriented to person, place, and time. Pt able to follow directions. Pt required max A x1 to complete bed mobility, mod <> min A sit to stand, min A using RW to take few lateral side steps using RW, max A LBD, mod A UBD, min A using RW to complete toilet transfer to commode. Pt completing ADLs below baseline level of I and would benefit from continued OT in acute care to max functional independence. Recommend post acute rehab when medically stable. Will continue to follow   Goals   Patient Goals Pt stated that she would like to go to rehab and get better to return home to baseline level of I   Plan   Treatment Interventions ADL retraining;Functional transfer training;UE strengthening/ROM; Endurance training;Patient/family training;Equipment evaluation/education; Compensatory technique education;Continued evaluation; Energy conservation; Activityengagement   Goal Expiration Date 08/31/23   OT Treatment Day 0  (Monday 8/21/23)   OT Frequency 3-5x/wk   Recommendation   OT Discharge Recommendation Post acute rehabilitation services   AM-PAC Daily Activity Inpatient   Lower Body Dressing 2   Bathing 2   Toileting 2   Upper Body Dressing 2   Grooming 3   Eating 3   Daily Activity Raw Score 14   Daily Activity Standardized Score (Calc for Raw Score >=11) 33.39   AM-PAC Applied Cognition Inpatient   Following a Speech/Presentation 2   Understanding Ordinary Conversation 4   Taking Medications 2   Remembering Where Things Are Placed or Put Away 3   Remembering List of 4-5 Errands 2   Taking Care of Complicated Tasks 2   Applied Cognition Raw Score 15   Applied Cognition Standardized Score 33.54   Barthel Index   Feeding 10   Bathing 0   Grooming Score 0   Dressing Score 5   Bladder Score 5   Bowels Score 5   Toilet Use Score 5   Transfers (Bed/Chair) Score 10   Mobility (Level Surface) Score 0   Stairs Score 0   Barthel Index Score 40   Modified Beaman Scale   Modified Beaman Scale 4   Additional Treatment Session   Start Time 1530   End Time 1557   Treatment Assessment Pt seen for skilled OT tx session day 1 following re-eval. Pt agreeable and motivated to participate. Pt seated on commode attempting to void. Pt motivated to sit out of bed in recliner chair. Pt required max A to participate in personal hygiene after voiding. Pt required min A to stand from commode and min A using RW for short distance functional mobility using RW. Pt tolerated standing approx 2 minutes w/ fair - balance using RW. Pt seated OOB in chair at end of session w/ needs met, call bell in reach and chair alarm activated. Continue to recommend post acute rehab when medically stable for discharge from acute care. Will continue to follow   Additional Treatment Day 1  (Monday 8/21/23)   End of Consult   Patient Position at End of Consult Bedside chair;Bed/Chair alarm activated; All needs within reach   Nurse Communication Nurse aware of consult   Licensure   [place] License Number  Bill Dubin, OTR/COLUMBA [id number]      Pt goals to be met by 8/31/23 to max I w/ ADLs and improve engagement includes:    -Pt will demonstrate good attention and participation in ongoing eval of functional cognition to assist in DC planning    -Pt will consistently follow multi step directions during ADLs to improve engagement, return home    -Pt will complete bed mobility supine <> sit w/ min A in preparation for ADLs    -Pt will complete functional transfers to bed, chair and toilet using LRAD, DME as needed w/ S to max I and  minimize burden of care    -Pt will consistently engage in functional mobility using LRAD to / from bathroom w/ S to max I w/ ADLs    -Pt will demonstrate improved activity and sitting tolerance OOB in chair for all meals    -Pt will complete UBD w/ mod I    -Pt will complete LBD w/ min A using LHAE as needed to max I and minimize burden of care    -Pt will demonstrate improved activity tolerance to participate in ADLs / preferred leisure task in supported sitting for at least 10 minutes w/ out rest break or sign / symptoms of fatigue. The patient's raw score on the -PAC Daily Activity Inpatient Short Form is 14. A raw score of less than 19 suggests the patient may benefit from discharge to post-acute rehabilitation services. Please refer to the recommendation of the Occupational Therapist for safe discharge planning.     Kristin Bynum, OTR/L  [id number]  [id number]

## 2023-08-21 NOTE — ASSESSMENT & PLAN NOTE
Lab Results   Component Value Date    HGBA1C 9.2 (H) 08/18/2023       Recent Labs     08/20/23  2032 08/21/23  0154 08/21/23  0748 08/21/23  1120   POCGLU 216* 180* 153* 187*       Blood Sugar Average: Last 72 hrs:  (P) [id number]   · Continue 25 units of lantus insulin at bedtime, 5 units of Humalog 3 times daily with meals and insulin sliding scale  · Patient is on diabetic diet

## 2023-08-21 NOTE — ASSESSMENT & PLAN NOTE
Baseline hemoglobin has been in the range of 10 which dropped to 8.1  No evidence of GI bleeding or blood in the urine  Likely secondary to acute blood loss bleeding from the breast after the placement of breast drain  Hemoglobin dropped slightly but has been stable in the 8 range.   Resume Eliquis with a follow-up hemoglobin

## 2023-08-21 NOTE — PHYSICAL THERAPY NOTE
PT TREATMENT       08/21/23 1559   Note Type   Note Type Treatment   Pain Assessment   Pain Assessment Tool 0-10   Pain Score No Pain   Restrictions/Precautions   Weight Bearing Precautions Per Order No   Other Precautions Chair Alarm; Bed Alarm;Contact/isolation;O2;Fall Risk;Pain;Cognitive   General   Chart Reviewed Yes   Family/Caregiver Present No   Cognition   Arousal/Participation Cooperative   Orientation Level Oriented to person;Oriented to time;Oriented to place   Memory Decreased recall of recent events   Following Commands Follows multistep commands with increased time or repetition   Subjective   Subjective Pt agreeable to PT session this afternoon - joined ROBERTO Sheth pt motivated to get OOB today   Bed Mobility   Additional Comments Received sitting in bedside chair   Transfers   Sit to Stand 3  Moderate assistance  (Progressing to Min A from bed/commode)   Additional items Assist x 1;Verbal cues; Bedrails   Stand to Sit 4  Minimal assistance   Additional items Assist x 1;Verbal cues; Bedrails   Ambulation/Elevation   Gait pattern Foward flexed; Short stride; Step to; Wide ONUR   Gait Assistance 4  Minimal assist   Additional items Assist x 1;Verbal cues   Assistive Device Rolling walker   Distance 5 feet + 2 feet   Stair Management Assistance Not tested   Balance   Static Sitting Fair +   Dynamic Sitting Fair   Static Standing Fair -   Dynamic Standing Fair -   Ambulatory Poor +   Activity Tolerance   Activity Tolerance Patient tolerated treatment well;Patient limited by fatigue;Patient limited by pain   Nurse Made Aware yes LUISITO Jnesen   Assessment   Prognosis Good   Problem List Decreased strength;Decreased endurance; Impaired balance;Decreased mobility; Decreased coordination;Decreased cognition; Impaired judgement;Decreased safety awareness; Obesity; Decreased skin integrity;Pain   Assessment Pt seen for PT session this afternoon - seen with ROBERTO Sheth due to level of assist. Overall pt has progressed well since last session with improved tolerance to bed mobility. Pt able to perform bed > commode transfer with Mod progressing to Min A using RW. Pt also able to take 3-4 steps towards [facility] + transfer to bedside chair with Min A using RW. Pt fatigues easily but pt happy and motivated by progress. The patient's AM-PAC Basic Mobility Inpatient Short Form Raw Score is 13. A Raw score of less than or equal to 16 suggests the patient may benefit from discharge to post-acute rehabilitation services. Please also refer to the recommendation of the Physical Therapist for safe discharge planning. Goals   Patient Goals none stated   Plan   Treatment/Interventions ADL retraining;Functional transfer training;LE strengthening/ROM; Therapeutic exercise; Endurance training;Bed mobility;Gait training;Spoke to nursing;OT   Progress Progressing toward goals   PT Frequency Other (Comment)  (5x/week)   Recommendation   PT Discharge Recommendation Post acute rehabilitation services   AM-PAC Basic Mobility Inpatient   Turning in Flat Bed Without Bedrails 2   Lying on Back to Sitting on Edge of Flat Bed Without Bedrails 2   Moving Bed to Chair 2   Standing Up From Chair Using Arms 3   Walk in Room 3   Climb 3-5 Stairs With Railing 1   Basic Mobility Inpatient Raw Score 13   Basic Mobility Standardized Score 33.99   Turning Head Towards Sound 3   Follow Simple Instructions 3   Low Function Basic Mobility Raw Score  19   Low Function Basic Mobility Standardized Score  31.06   Highest Level Of Mobility   JH-HLM Goal 4: Move to chair/commode   JH-HLM Achieved 5: Stand (1 or more minutes)   End of Consult   Patient Position at End of Consult Bedside chair;Bed/Chair alarm activated; All needs within reach   [facility] Number  Steffany Moseley [id number]

## 2023-08-21 NOTE — PLAN OF CARE
Problem: RESPIRATORY - ADULT  Goal: Achieves optimal ventilation and oxygenation  Description: INTERVENTIONS:  - Assess for changes in respiratory status  - Assess for changes in mentation and behavior  - Position to facilitate oxygenation and minimize respiratory effort  - Oxygen administered by appropriate delivery if ordered  - Initiate smoking cessation education as indicated  - Encourage broncho-pulmonary hygiene including cough, deep breathe, Incentive Spirometry  - Assess the need for suctioning and aspirate as needed  - Assess and instruct to report SOB or any respiratory difficulty  - Respiratory Therapy support as indicated  Outcome: Progressing     Problem: CARDIOVASCULAR - ADULT  Goal: Maintains optimal cardiac output and hemodynamic stability  Description: INTERVENTIONS:  - Monitor I/O, vital signs and rhythm  - Monitor for S/S and trends of decreased cardiac output  - Administer and titrate ordered vasoactive medications to optimize hemodynamic stability  - Assess quality of pulses, skin color and temperature  - Assess for signs of decreased coronary artery perfusion  - Instruct patient to report change in severity of symptoms  Outcome: Progressing  Goal: Absence of cardiac dysrhythmias or at baseline rhythm  Description: INTERVENTIONS:  - Continuous cardiac monitoring, vital signs, obtain 12 lead EKG if ordered  - Administer antiarrhythmic and heart rate control medications as ordered  - Monitor electrolytes and administer replacement therapy as ordered  Outcome: Progressing     Problem: METABOLIC, FLUID AND ELECTROLYTES - ADULT  Goal: Electrolytes maintained within normal limits  Description: INTERVENTIONS:  - Monitor labs and assess patient for signs and symptoms of electrolyte imbalances  - Administer electrolyte replacement as ordered  - Monitor response to electrolyte replacements, including repeat lab results as appropriate  - Instruct patient on fluid and nutrition as appropriate  Outcome: Progressing  Goal: Fluid balance maintained  Description: INTERVENTIONS:  - Monitor labs   - Monitor I/O and WT  - Instruct patient on fluid and nutrition as appropriate  - Assess for signs & symptoms of volume excess or deficit  Outcome: Progressing  Goal: Glucose maintained within target range  Description: INTERVENTIONS:  - Monitor Blood Glucose as ordered  - Assess for signs and symptoms of hyperglycemia and hypoglycemia  - Administer ordered medications to maintain glucose within target range  - Assess nutritional intake and initiate nutrition service referral as needed  Outcome: Progressing     Problem: SKIN/TISSUE INTEGRITY - ADULT  Goal: Skin Integrity remains intact(Skin Breakdown Prevention)  Description: Assess:  -Perform Hung assessment every shift  -Clean and moisturize skin every 2 hours and as needed  -Inspect skin when repositioning, toileting, and assisting with ADLS  -Assess under medical devices such as oxygen tubing every 2 hours  -Assess extremities for adequate circulation and sensation     Bed Management:  -Have minimal linens on bed & keep smooth, unwrinkled  -Change linens as needed when moist or perspiring  -Avoid sitting or lying in one position for more than 2 hours while in bed  -Keep HOB at 30degrees     Toileting:  -Offer bedside commode  -Assess for incontinence every 2 hours  -Use incontinent care products after each incontinent episode such as pads and external urinary catheter    Activity:  -Mobilize patient 2 times a day  -Encourage activity and walks on unit  -Encourage or provide ROM exercises   -Turn and reposition patient every 2 Hours  -Use appropriate equipment to lift or move patient in bed  -Instruct/ Assist with weight shifting every 2 hours when out of bed in chair  -Consider limitation of chair time 2 hour intervals    Skin Care:  -Avoid use of baby powder, tape, friction and shearing, hot water or constrictive clothing  -Relieve pressure over bony prominences using pillows  -Do not massage red bony areas      Outcome: Progressing  Goal: Incision(s), wounds(s) or drain site(s) healing without S/S of infection  Description: INTERVENTIONS  - Assess and document dressing, incision, wound bed, drain sites and surrounding tissue  - Provide patient and family education  - Perform skin care/dressing changes every day and as needed  Outcome: Progressing  Goal: Pressure injury heals and does not worsen  Description: Interventions:  - Implement low air loss mattress or specialty surface (Criteria met)  - Apply silicone foam dressing  - Instruct/assist with weight shifting every 120 minutes when in chair   - Limit chair time to 2 hour intervals  - Use special pressure reducing interventions such as EHOB  when in chair   - Apply fecal or urinary incontinence containment device   - Perform passive or active ROM every 2 hours  - Turn and reposition patient & offload bony prominences every 2 hours   - Utilize friction reducing device or surface for transfers   - Consider consults to  interdisciplinary teams such as PT  - Use incontinent care products after each incontinent episode such as pads  - Consider nutrition services referral as needed  Outcome: Progressing     Problem: INFECTION - ADULT  Goal: Absence or prevention of progression during hospitalization  Description: INTERVENTIONS:  - Assess and monitor for signs and symptoms of infection  - Monitor lab/diagnostic results  - Monitor all insertion sites, i.e. indwelling lines, tubes, and drains  - Monitor endotracheal if appropriate and nasal secretions for changes in amount and color  - [place] appropriate cooling/warming therapies per order  - Administer medications as ordered  - Instruct and encourage patient and family to use good hand hygiene technique  - Identify and instruct in appropriate isolation precautions for identified infection/condition  Outcome: Progressing     Problem: SAFETY ADULT  Goal: Patient will remain free of falls  Description: INTERVENTIONS:  - Educate patient/family on patient safety including physical limitations  - Instruct patient to call for assistance with activity   - Consult OT/PT to assist with strengthening/mobility   - Keep Call bell within reach  - Keep bed low and locked with side rails adjusted as appropriate  - Keep care items and personal belongings within reach  - Initiate and maintain comfort rounds  - Make Fall Risk Sign visible to staff  - Offer Toileting every 2 Hours, in advance of need  - Initiate/Maintain bed alarm  - Obtain necessary fall risk management equipment:   - Apply yellow socks and bracelet for high fall risk patients  - Consider moving patient to room near nurses station  Outcome: Progressing  Goal: Maintain or return to baseline ADL function  Description: INTERVENTIONS:  -  Assess patient's ability to carry out ADLs; assess patient's baseline for ADL function and identify physical deficits which impact ability to perform ADLs (bathing, care of mouth/teeth, toileting, grooming, dressing, etc.)  - Assess/evaluate cause of self-care deficits   - Assess range of motion  - Assess patient's mobility; develop plan if impaired  - Assess patient's need for assistive devices and provide as appropriate  - Encourage maximum independence but intervene and supervise when necessary  - Involve family in performance of ADLs  - Assess for home care needs following discharge   - Consider OT consult to assist with ADL evaluation and planning for discharge  - Provide patient education as appropriate  Outcome: Progressing  Goal: Maintains/Returns to pre admission functional level  Description: INTERVENTIONS:  - Perform BMAT or MOVE assessment daily.   - Set and communicate daily mobility goal to care team and patient/family/caregiver. - Collaborate with rehabilitation services on mobility goals if consulted  - Perform Range of Motion 2 times a day. - Reposition patient every 2 hours.   - Dangle patient 2 times a day  - Stand patient 2 times a day  - Out of bed to chair 2 times a day   - Out of bed for meals 2 times a day  - Out of bed for toileting  - Record patient progress and toleration of activity level   Outcome: Progressing     Problem: DISCHARGE PLANNING  Goal: Discharge to home or other facility with appropriate resources  Description: INTERVENTIONS:  - Identify barriers to discharge w/patient and caregiver  - Arrange for needed discharge resources and transportation as appropriate  - Identify discharge learning needs (meds, wound care, etc.)  - Arrange for interpretive services to assist at discharge as needed  - Refer to Case Management Department for coordinating discharge planning if the patient needs post-hospital services based on physician/advanced practitioner order or complex needs related to functional status, cognitive ability, or social support system  Outcome: Progressing     Problem: MOBILITY - ADULT  Goal: Maintain or return to baseline ADL function  Description: INTERVENTIONS:  -  Assess patient's ability to carry out ADLs; assess patient's baseline for ADL function and identify physical deficits which impact ability to perform ADLs (bathing, care of mouth/teeth, toileting, grooming, dressing, etc.)  - Assess/evaluate cause of self-care deficits   - Assess range of motion  - Assess patient's mobility; develop plan if impaired  - Assess patient's need for assistive devices and provide as appropriate  - Encourage maximum independence but intervene and supervise when necessary  - Involve family in performance of ADLs  - Assess for home care needs following discharge   - Consider OT consult to assist with ADL evaluation and planning for discharge  - Provide patient education as appropriate  Outcome: Progressing  Goal: Maintains/Returns to pre admission functional level  Description: INTERVENTIONS:  - Perform BMAT or MOVE assessment daily.   - Set and communicate daily mobility goal to care team and patient/family/caregiver. - Collaborate with rehabilitation services on mobility goals if consulted  - Perform Range of Motion 2 times a day. - Reposition patient every 2 hours. - Dangle patient 2 times a day  - Stand patient 2 times a day  - Out of bed to chair 2 times a day   - Out of bed for meals 2 times a day  - Out of bed for toileting  - Record patient progress and toleration of activity level   Outcome: Progressing     Problem: Nutrition/Hydration-ADULT  Goal: Nutrient/Hydration intake appropriate for improving, restoring or maintaining nutritional needs  Description: Monitor and assess patient's nutrition/hydration status for malnutrition. Collaborate with interdisciplinary team and initiate plan and interventions as ordered. Monitor patient's weight and dietary intake as ordered or per policy. Utilize nutrition screening tool and intervene as necessary. Determine patient's food preferences and provide high-protein, high-caloric foods as appropriate.      INTERVENTIONS:  - Monitor oral intake, urinary output, labs, and treatment plans  - Assess nutrition and hydration status and recommend course of action  - Evaluate amount of meals eaten  - Assist patient with eating if necessary   - Allow adequate time for meals  - Recommend/ encourage appropriate diets, oral nutritional supplements, and vitamin/mineral supplements  - Order, calculate, and assess calorie counts as needed  -- Assess need for intravenous fluids  - Provide specific nutrition/hydration education as appropriate  - Include patient/family/caregiver in decisions related to nutrition  Outcome: Progressing

## 2023-08-21 NOTE — PLAN OF CARE
Problem: RESPIRATORY - ADULT  Goal: Achieves optimal ventilation and oxygenation  Description: INTERVENTIONS:  - Assess for changes in respiratory status  - Assess for changes in mentation and behavior  - Position to facilitate oxygenation and minimize respiratory effort  - Oxygen administered by appropriate delivery if ordered  - Initiate smoking cessation education as indicated  - Encourage broncho-pulmonary hygiene including cough, deep breathe, Incentive Spirometry  - Assess the need for suctioning and aspirate as needed  - Assess and instruct to report SOB or any respiratory difficulty  - Respiratory Therapy support as indicated  Outcome: Progressing     Problem: INFECTION - ADULT  Goal: Absence or prevention of progression during hospitalization  Description: INTERVENTIONS:  - Assess and monitor for signs and symptoms of infection  - Monitor lab/diagnostic results  - Monitor all insertion sites, i.e. indwelling lines, tubes, and drains  - Monitor endotracheal if appropriate and nasal secretions for changes in amount and color  - [place] appropriate cooling/warming therapies per order  - Administer medications as ordered  - Instruct and encourage patient and family to use good hand hygiene technique  - Identify and instruct in appropriate isolation precautions for identified infection/condition  Outcome: Progressing     Problem: SAFETY ADULT  Goal: Patient will remain free of falls  Description: INTERVENTIONS:  - Educate patient/family on patient safety including physical limitations  - Instruct patient to call for assistance with activity   - Consult OT/PT to assist with strengthening/mobility   - Keep Call bell within reach  - Keep bed low and locked with side rails adjusted as appropriate  - Keep care items and personal belongings within reach  - Initiate and maintain comfort rounds  - Make Fall Risk Sign visible to staff  - Offer Toileting every 2 Hours, in advance of need  - Initiate/Maintain bed alarm  - Obtain necessary fall risk management equipment:   - Apply yellow socks and bracelet for high fall risk patients  - Consider moving patient to room near nurses station  Outcome: Progressing  Goal: Maintain or return to baseline ADL function  Description: INTERVENTIONS:  -  Assess patient's ability to carry out ADLs; assess patient's baseline for ADL function and identify physical deficits which impact ability to perform ADLs (bathing, care of mouth/teeth, toileting, grooming, dressing, etc.)  - Assess/evaluate cause of self-care deficits   - Assess range of motion  - Assess patient's mobility; develop plan if impaired  - Assess patient's need for assistive devices and provide as appropriate  - Encourage maximum independence but intervene and supervise when necessary  - Involve family in performance of ADLs  - Assess for home care needs following discharge   - Consider OT consult to assist with ADL evaluation and planning for discharge  - Provide patient education as appropriate  Outcome: Progressing  Goal: Maintains/Returns to pre admission functional level  Description: INTERVENTIONS:  - Perform BMAT or MOVE assessment daily.   - Set and communicate daily mobility goal to care team and patient/family/caregiver. - Collaborate with rehabilitation services on mobility goals if consulted  - Perform Range of Motion 2 times a day. - Reposition patient every 2 hours.   - Dangle patient 2 times a day  - Stand patient 2 times a day  - Out of bed to chair 2 times a day   - Out of bed for meals 2 times a day  - Out of bed for toileting  - Record patient progress and toleration of activity level   Outcome: Progressing     Problem: DISCHARGE PLANNING  Goal: Discharge to home or other facility with appropriate resources  Description: INTERVENTIONS:  - Identify barriers to discharge w/patient and caregiver  - Arrange for needed discharge resources and transportation as appropriate  - Identify discharge learning needs (meds, wound care, etc.)  - Arrange for interpretive services to assist at discharge as needed  - Refer to Case Management Department for coordinating discharge planning if the patient needs post-hospital services based on physician/advanced practitioner order or complex needs related to functional status, cognitive ability, or social support system  Outcome: Progressing     Problem: Knowledge Deficit  Goal: Patient/family/caregiver demonstrates understanding of disease process, treatment plan, medications, and discharge instructions  Description: Complete learning assessment and assess knowledge base.   Interventions:  - Provide teaching at level of understanding  - Provide teaching via preferred learning methods  Outcome: Progressing     Problem: CARDIOVASCULAR - ADULT  Goal: Maintains optimal cardiac output and hemodynamic stability  Description: INTERVENTIONS:  - Monitor I/O, vital signs and rhythm  - Monitor for S/S and trends of decreased cardiac output  - Administer and titrate ordered vasoactive medications to optimize hemodynamic stability  - Assess quality of pulses, skin color and temperature  - Assess for signs of decreased coronary artery perfusion  - Instruct patient to report change in severity of symptoms  Outcome: Progressing  Goal: Absence of cardiac dysrhythmias or at baseline rhythm  Description: INTERVENTIONS:  - Continuous cardiac monitoring, vital signs, obtain 12 lead EKG if ordered  - Administer antiarrhythmic and heart rate control medications as ordered  - Monitor electrolytes and administer replacement therapy as ordered  Outcome: Progressing     Problem: METABOLIC, FLUID AND ELECTROLYTES - ADULT  Goal: Electrolytes maintained within normal limits  Description: INTERVENTIONS:  - Monitor labs and assess patient for signs and symptoms of electrolyte imbalances  - Administer electrolyte replacement as ordered  - Monitor response to electrolyte replacements, including repeat lab results as appropriate  - Instruct patient on fluid and nutrition as appropriate  Outcome: Progressing  Goal: Fluid balance maintained  Description: INTERVENTIONS:  - Monitor labs   - Monitor I/O and WT  - Instruct patient on fluid and nutrition as appropriate  - Assess for signs & symptoms of volume excess or deficit  Outcome: Progressing  Goal: Glucose maintained within target range  Description: INTERVENTIONS:  - Monitor Blood Glucose as ordered  - Assess for signs and symptoms of hyperglycemia and hypoglycemia  - Administer ordered medications to maintain glucose within target range  - Assess nutritional intake and initiate nutrition service referral as needed  Outcome: Progressing     Problem: SKIN/TISSUE INTEGRITY - ADULT  Goal: Skin Integrity remains intact(Skin Breakdown Prevention)  Description: Assess:  -Perform Hung assessment every shift  -Clean and moisturize skin every 2 hours and as needed  -Inspect skin when repositioning, toileting, and assisting with ADLS  -Assess under medical devices such as oxygen tubing every 2 hours  -Assess extremities for adequate circulation and sensation     Bed Management:  -Have minimal linens on bed & keep smooth, unwrinkled  -Change linens as needed when moist or perspiring  -Avoid sitting or lying in one position for more than 2 hours while in bed  -Keep HOB at 30degrees     Toileting:  -Offer bedside commode  -Assess for incontinence every 2 hours  -Use incontinent care products after each incontinent episode such as pads and external urinary catheter    Activity:  -Mobilize patient 2 times a day  -Encourage activity and walks on unit  -Encourage or provide ROM exercises   -Turn and reposition patient every 2 Hours  -Use appropriate equipment to lift or move patient in bed  -Instruct/ Assist with weight shifting every 2 hours when out of bed in chair  -Consider limitation of chair time 2 hour intervals    Skin Care:  -Avoid use of baby powder, tape, friction and shearing, hot water or constrictive clothing  -Relieve pressure over bony prominences using pillows  -Do not massage red bony areas      Outcome: Progressing  Goal: Incision(s), wounds(s) or drain site(s) healing without S/S of infection  Description: INTERVENTIONS  - Assess and document dressing, incision, wound bed, drain sites and surrounding tissue  - Provide patient and family education  - Perform skin care/dressing changes every day and as needed  Outcome: Progressing  Goal: Pressure injury heals and does not worsen  Description: Interventions:  - Implement low air loss mattress or specialty surface (Criteria met)  - Apply silicone foam dressing  - Instruct/assist with weight shifting every 120 minutes when in chair   - Limit chair time to 2 hour intervals  - Use special pressure reducing interventions such as EHOB  when in chair   - Apply fecal or urinary incontinence containment device   - Perform passive or active ROM every 2 hours  - Turn and reposition patient & offload bony prominences every 2 hours   - Utilize friction reducing device or surface for transfers   - Consider consults to  interdisciplinary teams such as PT  - Use incontinent care products after each incontinent episode such as pads  - Consider nutrition services referral as needed  Outcome: Progressing     Problem: MOBILITY - ADULT  Goal: Maintain or return to baseline ADL function  Description: INTERVENTIONS:  -  Assess patient's ability to carry out ADLs; assess patient's baseline for ADL function and identify physical deficits which impact ability to perform ADLs (bathing, care of mouth/teeth, toileting, grooming, dressing, etc.)  - Assess/evaluate cause of self-care deficits   - Assess range of motion  - Assess patient's mobility; develop plan if impaired  - Assess patient's need for assistive devices and provide as appropriate  - Encourage maximum independence but intervene and supervise when necessary  - Involve family in performance of ADLs  - Assess for home care needs following discharge   - Consider OT consult to assist with ADL evaluation and planning for discharge  - Provide patient education as appropriate  Outcome: Progressing  Goal: Maintains/Returns to pre admission functional level  Description: INTERVENTIONS:  - Perform BMAT or MOVE assessment daily.   - Set and communicate daily mobility goal to care team and patient/family/caregiver. - Collaborate with rehabilitation services on mobility goals if consulted  - Perform Range of Motion 2 times a day. - Reposition patient every 2 hours. - Dangle patient 2 times a day  - Stand patient 2 times a day  - Out of bed to chair 2 times a day   - Out of bed for meals 2 times a day  - Out of bed for toileting  - Record patient progress and toleration of activity level   Outcome: Progressing     Problem: Prexisting or High Potential for Compromised Skin Integrity  Goal: Skin integrity is maintained or improved  Description: INTERVENTIONS:  - Identify patients at risk for skin breakdown  - Assess and monitor skin integrity  - Assess and monitor nutrition and hydration status  - Monitor labs   - Assess for incontinence   - Turn and reposition patient  - Assist with mobility/ambulation  - Relieve pressure over bony prominences  - Avoid friction and shearing  - Provide appropriate hygiene as needed including keeping skin clean and dry  - Evaluate need for skin moisturizer/barrier cream  - Collaborate with interdisciplinary team   - Patient/family teaching  - Consider wound care consult   Outcome: Progressing     Problem: Nutrition/Hydration-ADULT  Goal: Nutrient/Hydration intake appropriate for improving, restoring or maintaining nutritional needs  Description: Monitor and assess patient's nutrition/hydration status for malnutrition. Collaborate with interdisciplinary team and initiate plan and interventions as ordered. Monitor patient's weight and dietary intake as ordered or per policy.  Utilize nutrition screening tool and intervene as necessary. Determine patient's food preferences and provide high-protein, high-caloric foods as appropriate.      INTERVENTIONS:  - Monitor oral intake, urinary output, labs, and treatment plans  - Assess nutrition and hydration status and recommend course of action  - Evaluate amount of meals eaten  - Assist patient with eating if necessary   - Allow adequate time for meals  - Recommend/ encourage appropriate diets, oral nutritional supplements, and vitamin/mineral supplements  - Order, calculate, and assess calorie counts as needed  -- Assess need for intravenous fluids  - Provide specific nutrition/hydration education as appropriate  - Include patient/family/caregiver in decisions related to nutrition  Outcome: Progressing

## 2023-08-21 NOTE — CASE MANAGEMENT
Case Management Progress Note    Patient name Ynes Landon  Location [address] [place] 405-* MRN [id number]  : 1950 Date 2023       LOS (days): 10  Geometric Mean LOS (GMLOS) (days): 9.60  Days to GMLOS:-0.4        OBJECTIVE:      Current admission status: Inpatient  Preferred Pharmacy:   [facility] #[id number] Jas Corley, [address] ([address])  [address] [id number])  [address] [address]  Phone: [phone] Fax: [phone]    Primary Care Provider: Twin Howe MD    Primary Insurance: MEDICARE  Secondary Insurance: [employer]IG[employer]    PROGRESS NOTE:    SANDRA spoke with patient and nieces Annbrenden Mcgill and Sarah Ferrari at bedside to review patient choice list again. Patient continues to express preference for [facility] which had previously not offered a bed. SANDRA agreed to send a message to [facility] regarding patient's intent to postpone any radiation treatments until she has finished STR and determine if [facility] is now able to accept. SANDRA encouraged family to choose a second choice facility from the list provided and Ann Mcgill stated that she will discuss with her mother, patient's sister Janeen Byrd. SANDRA will continue to follow.

## 2023-08-21 NOTE — ASSESSMENT & PLAN NOTE
· Diagnosed in March 2023  · Status postlumpectomy on 5/30/2023  · Radiation was to start 8/16 at [facility]  · Outpatient follow-up with oncology

## 2023-08-21 NOTE — ASSESSMENT & PLAN NOTE
Patient did not have a bowel movement in 3 days and complaining of some abdominal discomfort and nausea   patient started MiraLAX 17 g p.o. daily and was given Dulcolax suppository without bowel movement  Patient also had on senna and is having good bowel movements.

## 2023-08-21 NOTE — PLAN OF CARE
Problem: PHYSICAL THERAPY ADULT  Goal: Performs mobility at highest level of function for planned discharge setting. See evaluation for individualized goals. Description: Treatment/Interventions: ADL retraining, Functional transfer training, LE strengthening/ROM, Therapeutic exercise, Endurance training, Bed mobility, Gait training, Spoke to nursing, OT          See flowsheet documentation for full assessment, interventions and recommendations. Outcome: Progressing  Note: Prognosis: Good  Problem List: Decreased strength, Decreased endurance, Impaired balance, Decreased mobility, Decreased coordination, Decreased cognition, Impaired judgement, Decreased safety awareness, Obesity, Decreased skin integrity, Pain  Assessment: Pt seen for PT session this afternoon - seen with OT Meryl due to level of assist. Overall pt has progressed well since last session with improved tolerance to bed mobility. Pt able to perform bed > commode transfer with Mod progressing to Min A using RW. Pt also able to take 3-4 steps towards [facility] + transfer to bedside chair with Min A using RW. Pt fatigues easily but pt happy and motivated by progress. PT Discharge Recommendation: Post acute rehabilitation services    See flowsheet documentation for full assessment.

## 2023-08-22 VITALS
WEIGHT: 264 LBS | SYSTOLIC BLOOD PRESSURE: 148 MMHG | RESPIRATION RATE: 20 BRPM | DIASTOLIC BLOOD PRESSURE: 74 MMHG | HEART RATE: 63 BPM | OXYGEN SATURATION: 98 % | HEIGHT: 62 IN | BODY MASS INDEX: 48.58 KG/M2 | TEMPERATURE: 97.8 F

## 2023-08-22 PROBLEM — K59.00 CONSTIPATED: Status: ACTIVE | Noted: 2023-08-22

## 2023-08-22 LAB
ANION GAP SERPL CALCULATED.3IONS-SCNC: 5 MMOL/L
BACTERIA SPEC BFLD CULT: NO GROWTH
BUN SERPL-MCNC: 67 MG/DL (ref 5–25)
CALCIUM SERPL-MCNC: 8.7 MG/DL (ref 8.4–10.2)
CHLORIDE SERPL-SCNC: 109 MMOL/L (ref 96–108)
CO2 SERPL-SCNC: 28 MMOL/L (ref 21–32)
CREAT SERPL-MCNC: 2.07 MG/DL (ref 0.6–1.3)
ERYTHROCYTE [DISTWIDTH] IN BLOOD BY AUTOMATED COUNT: 15.6 % (ref 11.6–15.1)
GFR SERPL CREATININE-BSD FRML MDRD: 23 ML/MIN/1.73SQ M
GLUCOSE SERPL-MCNC: 191 MG/DL (ref 65–140)
GLUCOSE SERPL-MCNC: 91 MG/DL (ref 65–140)
GLUCOSE SERPL-MCNC: 94 MG/DL (ref 65–140)
GLUCOSE SERPL-MCNC: 99 MG/DL (ref 65–140)
GRAM STN SPEC: NORMAL
GRAM STN SPEC: NORMAL
HCT VFR BLD AUTO: 28 % (ref 34.8–46.1)
HGB BLD-MCNC: 8.5 G/DL (ref 11.5–15.4)
MCH RBC QN AUTO: 28.7 PG (ref 26.8–34.3)
MCHC RBC AUTO-ENTMCNC: 30.4 G/DL (ref 31.4–37.4)
MCV RBC AUTO: 95 FL (ref 82–98)
PLATELET # BLD AUTO: 280 THOUSANDS/UL (ref 149–390)
PMV BLD AUTO: 10.6 FL (ref 8.9–12.7)
POTASSIUM SERPL-SCNC: 4.7 MMOL/L (ref 3.5–5.3)
RBC # BLD AUTO: 2.96 MILLION/UL (ref 3.81–5.12)
SODIUM SERPL-SCNC: 142 MMOL/L (ref 135–147)
VANCOMYCIN TROUGH SERPL-MCNC: 15 UG/ML (ref 10–20)
WBC # BLD AUTO: 9.59 THOUSAND/UL (ref 4.31–10.16)

## 2023-08-22 PROCEDURE — 99232 SBSQ HOSP IP/OBS MODERATE 35: CPT | Performed by: INTERNAL MEDICINE

## 2023-08-22 PROCEDURE — 80048 BASIC METABOLIC PNL TOTAL CA: CPT | Performed by: PHYSICIAN ASSISTANT

## 2023-08-22 PROCEDURE — 99239 HOSP IP/OBS DSCHRG MGMT >30: CPT | Performed by: STUDENT IN AN ORGANIZED HEALTH CARE EDUCATION/TRAINING PROGRAM

## 2023-08-22 PROCEDURE — 82948 REAGENT STRIP/BLOOD GLUCOSE: CPT

## 2023-08-22 PROCEDURE — 99232 SBSQ HOSP IP/OBS MODERATE 35: CPT | Performed by: STUDENT IN AN ORGANIZED HEALTH CARE EDUCATION/TRAINING PROGRAM

## 2023-08-22 PROCEDURE — 80202 ASSAY OF VANCOMYCIN: CPT | Performed by: PHYSICIAN ASSISTANT

## 2023-08-22 PROCEDURE — 85025 COMPLETE CBC W/AUTO DIFF WBC: CPT | Performed by: PHYSICIAN ASSISTANT

## 2023-08-22 PROCEDURE — 94760 N-INVAS EAR/PLS OXIMETRY 1: CPT

## 2023-08-22 PROCEDURE — 92526 ORAL FUNCTION THERAPY: CPT

## 2023-08-22 RX ORDER — DOXYCYCLINE 100 MG/1
100 TABLET ORAL 2 TIMES DAILY
Qty: 18 TABLET | Refills: 0 | Status: SHIPPED | OUTPATIENT
Start: 2023-08-22 | End: 2023-08-30 | Stop reason: CLARIF

## 2023-08-22 RX ORDER — SENNOSIDES 8.6 MG
17.2 TABLET ORAL
Qty: 60 TABLET | Refills: 0 | Status: SHIPPED | OUTPATIENT
Start: 2023-08-22 | End: 2023-08-30 | Stop reason: CLARIF

## 2023-08-22 RX ORDER — TORSEMIDE 10 MG/1
10 TABLET ORAL DAILY
Qty: 30 TABLET | Refills: 0 | Status: SHIPPED | OUTPATIENT
Start: 2023-08-23 | End: 2023-08-30 | Stop reason: CLARIF

## 2023-08-22 RX ORDER — METOPROLOL TARTRATE 50 MG/1
50 TABLET, FILM COATED ORAL EVERY 12 HOURS SCHEDULED
Qty: 60 TABLET | Refills: 0 | Status: SHIPPED | OUTPATIENT
Start: 2023-08-22 | End: 2023-08-30 | Stop reason: CLARIF

## 2023-08-22 RX ORDER — POLYETHYLENE GLYCOL 3350 17 G/17G
17 POWDER, FOR SOLUTION ORAL DAILY
Qty: 510 G | Refills: 0 | Status: SHIPPED | OUTPATIENT
Start: 2023-08-23 | End: 2023-08-30 | Stop reason: CLARIF

## 2023-08-22 RX ORDER — FERROUS SULFATE 325(65) MG
325 TABLET ORAL
Status: DISCONTINUED | OUTPATIENT
Start: 2023-08-22 | End: 2023-08-22 | Stop reason: HOSPADM

## 2023-08-22 RX ORDER — VANCOMYCIN HYDROCHLORIDE 1 G/200ML
1000 INJECTION, SOLUTION INTRAVENOUS ONCE
Status: COMPLETED | OUTPATIENT
Start: 2023-08-22 | End: 2023-08-22

## 2023-08-22 RX ORDER — FERROUS SULFATE 325(65) MG
325 TABLET ORAL
Qty: 30 TABLET | Refills: 0 | Status: SHIPPED | OUTPATIENT
Start: 2023-08-23 | End: 2023-08-30 | Stop reason: CLARIF

## 2023-08-22 RX ADMIN — FLUTICASONE PROPIONATE 1 SPRAY: 50 SPRAY, METERED NASAL at 08:43

## 2023-08-22 RX ADMIN — ONDANSETRON 4 MG: 2 INJECTION INTRAMUSCULAR; INTRAVENOUS at 06:19

## 2023-08-22 RX ADMIN — INSULIN LISPRO 5 UNITS: 100 INJECTION, SOLUTION INTRAVENOUS; SUBCUTANEOUS at 08:57

## 2023-08-22 RX ADMIN — INSULIN LISPRO 1 UNITS: 100 INJECTION, SOLUTION INTRAVENOUS; SUBCUTANEOUS at 12:08

## 2023-08-22 RX ADMIN — PANTOPRAZOLE SODIUM 40 MG: 40 TABLET, DELAYED RELEASE ORAL at 08:42

## 2023-08-22 RX ADMIN — NYSTATIN: 100000 POWDER TOPICAL at 10:21

## 2023-08-22 RX ADMIN — APIXABAN 5 MG: 5 TABLET, FILM COATED ORAL at 08:42

## 2023-08-22 RX ADMIN — TORSEMIDE 10 MG: 10 TABLET ORAL at 08:42

## 2023-08-22 RX ADMIN — ASPIRIN 81 MG CHEWABLE TABLET 81 MG: 81 TABLET CHEWABLE at 08:42

## 2023-08-22 RX ADMIN — METOPROLOL TARTRATE 50 MG: 50 TABLET, FILM COATED ORAL at 08:42

## 2023-08-22 RX ADMIN — FLUTICASONE FUROATE AND VILANTEROL TRIFENATATE 1 PUFF: 100; 25 POWDER RESPIRATORY (INHALATION) at 08:43

## 2023-08-22 RX ADMIN — FERROUS SULFATE TAB 325 MG (65 MG ELEMENTAL FE) 325 MG: 325 (65 FE) TAB at 10:21

## 2023-08-22 RX ADMIN — Medication 250 MG: at 08:42

## 2023-08-22 RX ADMIN — INSULIN LISPRO 5 UNITS: 100 INJECTION, SOLUTION INTRAVENOUS; SUBCUTANEOUS at 12:08

## 2023-08-22 RX ADMIN — VANCOMYCIN HYDROCHLORIDE 1000 MG: 1 INJECTION, SOLUTION INTRAVENOUS at 10:49

## 2023-08-22 NOTE — NURSING NOTE
This RN attempted to call report at [facility] twice with no answer. Will try again later.

## 2023-08-22 NOTE — ASSESSMENT & PLAN NOTE
· Diagnosed in March 2023  · Status postlumpectomy on 5/30/2023  · Radiation was to start 8/16 at [facility]  · Outpatient follow-up with oncology

## 2023-08-22 NOTE — ASSESSMENT & PLAN NOTE
Lab Results   Component Value Date    EGFR 23 08/22/2023    EGFR 19 08/21/2023    EGFR 21 08/20/2023    CREATININE 2.07 (H) 08/22/2023    CREATININE 2.38 (H) 08/21/2023    CREATININE 2.22 (H) 08/20/2023   · Suspected secondary to ATN in the setting of infection with hypotension and also possible contribution from cardio renal syndrome  · Creatinine baseline is 1.5-1.8  · Creatinine upon admission was 2.9   · Renal ultrasound showed no evidence of hydronephrosis  · Patient initially received few dose of IV Lasix which were later discontinued and patient received IV fluids and patient became hypotensive on 8/16/2023 following which IV fluids have been on hold  · Creatinine has creased to 1.6 but has again increased to 2.2-2.3.   Diuretics have been on hold for the past few days  · Nephrology has been following  · Echo cardiogram shows EF 55%  · Patient to be restarted back on Demadex 10 mg p.o. daily  · F/U with nephrology as outpatient

## 2023-08-22 NOTE — [place] UNIVERSAL TRANSFER FORM
[place] UNIVERSAL TRANSFER FORM  (ALL ITEMS MUST BE COMPLETED)    1. TRANSFER FROM: [address] [place] [address]    2. DATE OF TRANSFER: 8/22/2023                        TIME OF TRANSFER: 1400    3. PATIENT NAME: Ynes Landon,        YOB: 1950                             GENDER: female    4. LANGUAGE:   English    5. PHYSICIAN NAME:  Inderjitani HuertaDO abigail                   PHONE: [phone]    6. CODE STATUS: Level 1 - Full Code        Out of Hospital DNR Attached: No    7. :                                      :  Extended Emergency Contact Information  Primary Emergency Contact: Zaire Zelaya [facility]  Mobile Phone: [phone]  Relation: Sister  Secondary Emergency Contact: Lesly Schultz  Mobile Phone: [phone]  Relation: Niece           Health Care Representative/Proxy:  No           Legal Guardian:  No             NAME OF:           HEALTH CARE REPRESENTATIVE/PROXY:                                         OR           LEGAL GUARDIAN, IF NOT :                                               PHONE:  (Day)           (Night)                        (Cell)    8. REASON FOR TRANSFER: (Must include brief medical history and recent changes in physical function or cognition.) Rehab            V/S: /74   Pulse 63   Temp 97.8 °F (36.6 °C)   Resp 20   Ht 5' 2" (1.575 m)   Wt 120 kg (264 lb)   SpO2 98%   BMI 48.29 kg/m²           PAIN: None    9. PRIMARY DIAGNOSIS: Septic shock ([address])      Secondary Diagnosis:         Pacemaker: No      Internal Defib: No          Mental Health Diagnosis (if Applicable):    10. RESTRAINTS: No     11. RESPIRATORY NEEDS: Oxygen Device O2 2L,    12. ISOLATION/PRECAUTION: MRSA    15. ALLERGY: Penicillins, Moxifloxacin, Oxycodone-acetaminophen, Zinc acetate, Penicillins, Asa [aspirin], Indocin [indomethacin], Other, and Tannic acid    14. SENSORY:       Speech Clear    15. SKIN CONDITION: Yes:  OtherExcoriation under bilateral breats, abscess drain right breast, excoriation between upper thighs    16. DIET: Special (describe)dental soft diet    17. IV ACCESS: None    18. PERSONAL ITEMS SENT WITH PATIENT: None    19. ATTACHED DOCUMENTS: MUST ATTACH CURRENT MEDICATION INFORMATION Face Sheet, MAR, Diagnostic Studies, Labs, Respiratory Care, Code Status, Discharge Summary, PT Note, OT Note and HX/PE    20. AT RISK ALERTS:None        HARM TO: N/A    21. WEIGHT BEARING STATUS:         Left Leg: Full        Right Leg: Full    22. MENTAL STATUS:Alert and Oriented    23. FUNCTION:        Walk: With Help        Transfer: With Help        Toilet: With Help        Feed: Self    24. IMMUNIZATIONS/SCREENING:     Immunization History   Administered Date(s) Administered   • COVID-19 MODERNA VACC 0.5 ML IM 02/11/2022   • COVID-19 Moderna vac 6-11y or adult booster 50 mcg/0.5 mL 03/11/2022   • Tdap 01/22/2023   • Tuberculin Skin Test 02/20/2022   • Unknown 02/11/2022       25. BOWEL: Continent    26. BLADDER: Continent    27.  SENDING FACILITY CONTACT: [facility]                  Title: ALIREZA        Unit: [address]        Phone: [phone] [address] (if known):        Title:        Unit:         Phone:         FORM PREFILLED BY (if applicable)       Title:       Unit:        Phone:         FORM COMPLETED BY Priscila Souza RN      Title: [address]      Phone: [phone]

## 2023-08-22 NOTE — CASE MANAGEMENT
Case Management Discharge Planning Note    Patient name Shayy Ponce  Location [address] [place] 12-* MRN [id number]  : 1950 Date 2023       Current Admission Date: 2023  Current Admission Diagnosis:Septic shock [facility])   Patient Active Problem List    Diagnosis Date Noted   • Cerebrovascular accident (CVA) ([address])    • Bacteriuria 2023   • Breast abscess of female 2023   • Ductal carcinoma in situ (DCIS) of right breast 2023   • Intraductal papilloma of right breast 05/15/2023   • Abnormal mammogram 2023   • Allergies 2023   • Fall 2023   • Sepsis due to pneumonia ([address]) 2023   • Syncope 2023   • Type 2 diabetes mellitus with hyperglycemia, without long-term current use of insulin ([address]) 2023   • Impaired mobility and ADLs 2022   • Pressure injury of sacral region, stage 2 ([address]) 2022   • Rash 2022   • PSVT (paroxysmal supraventricular tachycardia) ([address]) 2022   • Chronic respiratory failure with hypoxia and hypercapnia ([address]) 2022   • Platelets decreased ([address]) 2022   • KATRINA (obstructive sleep apnea) 2022   • Obesity hypoventilation syndrome ([address]) 2022   • Toxic metabolic encephalopathy    • Mass overlapping multiple quadrants of right breast 2022   • Pressure injury of deep tissue of sacral region 2022   • Multiple pulmonary nodules determined by computed tomography of lung 2022   • Chronic diastolic heart failure ([address]) 2022   • Septic shock ([address]) 2022   • Peripheral venous insufficiency 2022   • Raynaud's disease 2022   • Lung nodule 2022   • Acute kidney injury superimposed on chronic kidney disease ([address])    • Status post reverse total replacement of left shoulder 2022   • Constipation 2022   • PONV (postoperative nausea and vomiting) 2022   • Diabetes mellitus, type 2 ([address]) 2022   • Gastroesophageal reflux disease 02/28/2022   • Nephrolithiasis 02/28/2022   • Arthritis 02/28/2022   • S/P total knee replacement, right 02/28/2022   • On continuous oral anticoagulation - eliquis 02/28/2022   • Essential hypertension 08/01/2019   • Anemia 07/31/2019   • Mixed hyperlipidemia 06/25/2019   • Paroxysmal atrial fibrillation ([address]) 05/28/2019   • Lower leg edema 05/27/2019   • Moderate persistent asthma without complication 17/51/6411   • Morbid obesity with BMI of 45.0-49.9, adult ([address]) 03/08/2018      LOS (days): 11  Geometric Mean LOS (GMLOS) (days): 9.60  Days to GMLOS:-1.3     OBJECTIVE:  Risk of Unplanned Readmission Score: 33.61       Current admission status: Inpatient   Preferred Pharmacy:   [address] [place] [address] [id number] [facility], [address] ([address] 22)  [address] [facility] ([address])  [address] [facility] [address]  Phone: [phone] Fax: [phone]    Primary Care Provider:  Raymond Morgan MD    Primary Insurance: MEDICARE  Secondary Insurance: CIGNA    DISCHARGE DETAILS:    Discharge planning discussed with[de-identified] Patient and niyoshi Debi Bachelor      CM contacted family/caregiver?: Yes  Were Treatment Team discharge recommendations reviewed with patient/caregiver?: Yes  Did patient/caregiver verbalize understanding of patient care needs?: N/A- going to facility  Were patient/caregiver advised of the risks associated with not following Treatment Team discharge recommendations?: Yes    Contacts  Patient Contacts: Natalie Ledbetter (niece)  Relationship to Patient[de-identified] Family  Contact Method: Phone  Phone Number: [phone]  Reason/Outcome: Discharge [address]         Is the patient interested in [address] [facility] at discharge?: No    DME Referral Provided  Referral made for DME?: No    Other Referral/Resources/Interventions Provided:  Interventions: Short Term Rehab, Transportation    Would you like to participate in our [address] [facility] Shared Web service program?  : No - Declined    Treatment Team Recommendation: Short Term Rehab  Discharge Destination Plan[de-identified] Short Term Rehab  Transport at Discharge : [facility] Ambulance  Dispatcher Contacted: Yes  Number/Name of Dispatcher: [facility] via [employer]  Transported by [employer] and Unit #):  [facility]  ETA of Transport (Date): 08/22/23  ETA of Transport (Time): 1500         IMM Given (Date):: 08/21/23        Accepting Facility Name, [address] [place] [address] Street : [address] [place] [address]  Receiving Facility/Agency Phone Number: [phone], ext [phone]  Facility/Agency Fax Number: [phone]

## 2023-08-22 NOTE — PROGRESS NOTES
NEPHROLOGY PROGRESS NOTE   Coleman Schmid 72 y.o. female MRN: [id number]  Unit/Bed#: [address] [place] [address] Encounter: [id number]  Reason for Consult: SEBASTIAN    ASSESSMENT AND PLAN:  72 woman with PMH of yo A-fib, diabetes, CKD G4, breast cancer, KATRINA p/w AMS found to be septic. Nephrology is consulted for management of SEBASTIAN     PLAN:     Labs from 8/22 reviewed and updated    #Non-Oliguric KDIGO SEBASTIAN stage 1 on CKd G4A3 with evidence of kidney recovery to her baseline  • Etiology: Likely secondary to ATN in the settings of sepsis  • Baseline creatinine 1.5 to 1.9 mg/dL  • Peak creatinine: 2.9 mg/dL  • Current creatinine:  2.07 mg/dL, back to baseline  • UA: No hematuria, no leukocyturia  • Renal imaging : No hydronephrosis  • Treatment:  • No indication of dialysis at this time, kidney function stable at baseline  • Maintain MAP:  Over 65 mmHg if possible/avoid hypoperfusion:  Hold parameters on blood pressure medications  • Avoid nephrotoxic agents such as NSAIDs, and IV contrast if possible. Avoid opioids   • Adjust medications to GFR  • Patient will need to follow-up with nephrology within 3 to 4 weeks of discharge     #CKD G4A3  • Baseline creatinine: 1.5 to 1.9 mg/dL  • Etiology:Likely secondary to nephrosclerosis in the settings of cardiovascular disease and diabetic glomerulopathy  • UPCR 1.02 g/g        #Acid-base Disorder  • serum HCO3  28 mmol/L  • Elevated bicarb likely secondary to hypercapnia in the settings of KATRINA     #Volume status/hypertension:  • Volume:  Euvolemic  • Blood pressure:  Borderline hypertension, /75mmhg   • Recommend:  • Low-sodium diet  • Continue torsemide 10 mg daily r  • Metoprolol 50 mg twice daily        #Anemia:  • Current hemoglobin: 8.5 mg/dL  • iron panel  ? Iron 16  ?  Saturation 5  • Treatment:  • Recommend IV iron once infection is cleared  • Transfuse for hemoglobin less than 7.0 per primary service    • Start ferrous sulfate once a day     #Sepsis  • Management as per primary team  • On vancomycin    Discussed with Dr. Ronnie Billy. Kidney function back to baseline. We agree to continue with torsemide 10 mg.      Nephrology will sign off at this time. Thank you for involving us in this case. Please call us if any question. SUBJECTIVE:  Patient seen and examined at bedside. No chest pain, shortness of breath, nausea, vomiting, abdominal pain or diarrhea.      OBJECTIVE:  Current Weight: Weight - Scale: 120 kg (264 lb)  Vitals:    08/22/23 0752   BP: 148/74   Pulse: 63   Resp:    Temp: 97.8 °F (36.6 °C)   SpO2: 98%       Intake/Output Summary (Last 24 hours) at 8/22/2023 0907  Last data filed at 8/22/2023 0600  Gross per 24 hour   Intake 130 ml   Output 1465 ml   Net -1335 ml     Wt Readings from Last 3 Encounters:   08/22/23 120 kg (264 lb)   08/17/23 129 kg (284 lb 6.3 oz)   08/02/23 119 kg (263 lb 3.2 oz)     Temp Readings from Last 3 Encounters:   08/22/23 97.8 °F (36.6 °C)   08/02/23 (!) 97.2 °F (36.2 °C) (Tympanic)   08/02/23 97.9 °F (36.6 °C) (Temporal)     BP Readings from Last 3 Encounters:   08/22/23 148/74   08/02/23 126/66   08/02/23 130/88     Pulse Readings from Last 3 Encounters:   08/22/23 63   08/02/23 63   08/02/23 79      General:  no acute distress at this time  Skin:  No acute rash  Eyes:  No scleral icterus and noninjected  ENT:  mucous membranes moist  Neck:  no carotid bruits  Chest:  Clear to auscultation percussion, good respiratory effort, no use of accessory respiratory muscles  CVS:  Regular rate and rhythm without rub   Abdomen:  soft and nontender   Extremities: trace extremity edema  Neuro:  No gross focality  Psych:  Alert , cooperative     Medications:    Current Facility-Administered Medications:   •  acetaminophen (TYLENOL) tablet 650 mg, 650 mg, Oral, Q6H PRN, Georgi yDe MD, 650 mg at 08/20/23 1348  •  ammonium lactate (LAC-HYDRIN) 12 % lotion 1 Application, 1 Application, Topical, BID PRN, William Roach MD, 1 Application at 37/77/26 1183  •  apixaban (ELIQUIS) tablet 5 mg, 5 mg, Oral, BID, Jayson Morales MD, 5 mg at 08/22/23 2860  •  aspirin chewable tablet 81 mg, 81 mg, Oral, Daily, Jayson Morales MD, 81 mg at 08/22/23 5599  •  atorvastatin (LIPITOR) tablet 40 mg, 40 mg, Oral, Daily With Nayely Mcfarland MD, 40 mg at 08/21/23 1700  •  bisacodyl (DULCOLAX) rectal suppository 10 mg, 10 mg, Rectal, Daily PRN, Jayson Morales MD, 10 mg at 08/19/23 1235  •  ferrous sulfate tablet 325 mg, 325 mg, Oral, Daily With Breakfast, Sade Painting MD  •  fluticasone (FLONASE) 50 mcg/act nasal spray 1 spray, 1 spray, Nasal, Daily, Jayson Morales MD, 1 spray at 08/22/23 0843  •  Fluticasone Furoate-Vilanterol 100-25 mcg/actuation 1 puff, 1 puff, Inhalation, Daily, Jayson Morales MD, 1 puff at 08/22/23 0843  •  insulin glargine (LANTUS) subcutaneous injection 25 Units 0.25 mL, 25 Units, Subcutaneous, HS, Jayson Morales MD, 25 Units at 08/21/23 2148  •  insulin lispro (HumaLOG) 100 units/mL subcutaneous injection 1-5 Units, 1-5 Units, Subcutaneous, TID AC, 1 Units at 08/21/23 1146 **AND** Fingerstick Glucose (POCT), , , TID AC, Georgi Dye MD  •  insulin lispro (HumaLOG) 100 units/mL subcutaneous injection 1-5 Units, 1-5 Units, Subcutaneous, HS, Jayson Morales MD, 1 Units at 08/20/23 2225  •  insulin lispro (HumaLOG) 100 units/mL subcutaneous injection 1-5 Units, 1-5 Units, Subcutaneous, 0200, Jayson Morales MD, 1 Units at 08/21/23 0202  •  insulin lispro (HumaLOG) 100 units/mL subcutaneous injection 5 Units, 5 Units, Subcutaneous, TID With Meals, Jayson Morales MD, 5 Units at 08/22/23 0857  •  metoprolol (LOPRESSOR) injection 5 mg, 5 mg, Intravenous, Q6H PRN, Jayson Morales MD, 5 mg at 08/19/23 0412  •  metoprolol tartrate (LOPRESSOR) tablet 50 mg, 50 mg, Oral, Q12H [facility], [facility] MD Hardik, 50 mg at 08/22/23 0842  •  montelukast (SINGULAIR) tablet 10 mg, 10 mg, Oral, HS, Jayson Morales MD, 10 mg at 08/21/23 2148  •  nystatin (MYCOSTATIN) powder, , Topical, BID, Shailesh Navarro MD, Given at 08/21/23 1700  •  ondansetron (ZOFRAN) injection 4 mg, 4 mg, Intravenous, Q6H PRN, Shailesh Navaror MD, 4 mg at 08/22/23 5550  •  pantoprazole (PROTONIX) EC tablet 40 mg, 40 mg, Oral, Daily, Georgi Dye MD, 40 mg at 08/22/23 3195  •  polyethylene glycol (MIRALAX) packet 17 g, 17 g, Oral, Daily, Georgi Dye MD, 17 g at 08/21/23 1001  •  saccharomyces boulardii (FLORASTOR) capsule 250 mg, 250 mg, Oral, BID, Georgi Dye MD, 250 mg at 08/22/23 8317  •  senna (SENOKOT) tablet 17.2 mg, 2 tablet, Oral, HS, Shailesh Navarro MD, 17.2 mg at 08/21/23 2148  •  torsemide (DEMADEX) tablet 10 mg, 10 mg, Oral, Daily, Opal Christensen MD, 10 mg at 08/22/23 3345  •  vancomycin (VANCOCIN) IVPB (premix in dextrose) 1,000 mg 200 mL, 12.5 mg/kg (Adjusted), Intravenous, Daily PRN, Daina Rose PA-C    Laboratory Results:  Results from last 7 days   Lab Units 08/22/23  0415 08/21/23  0606 08/20/23  1600 08/20/23  0511 08/19/23  0457 08/18/23  1219 08/18/23  0543 08/17/23  0809 08/17/23  0547 08/16/23  1322   WBC Thousand/uL 9.59 9.75  --  10.96*  --  14.95* 14.96*  --  14.72* 13.31*   HEMOGLOBIN g/dL 8.5* 8.1* 8.0* 8.1*  --  10.3* 10.5*  --  10.1* 10.0*   HEMATOCRIT % 28.0* 28.0* 26.9* 28.4*  --  33.5* 34.3*  --  32.1* 31.4*   PLATELETS Thousands/uL 280 294  --  295  --  250 260  --  184 185   SODIUM mmol/L 142 142  --  146 147  --  143 139  --  139   POTASSIUM mmol/L 4.7 4.8  --  5.4* 4.3  --  4.8 4.9  --  4.7   CHLORIDE mmol/L 109* 110*  --  112* 117*  --  109* 106  --  103   CO2 mmol/L 28 28  --  29 24  --  26 26  --  28   BUN mg/dL 67* 72*  --  67* 53*  --  58* 65*  --  67*   CREATININE mg/dL 2.07* 2.38*  --  2.22* 1.62*  --  1.88* 2.39*  --  2.71*   CALCIUM mg/dL 8.7 8.7  --  9.0 8.2*  --  9.3 8.6  --  8.8   MAGNESIUM mg/dL  --   --   --   --  2.2  --  2.3 2.0  --   --    PHOSPHORUS mg/dL  --   -- --   --  3.3  --   --  4.4*  --   --        IR drainage tube check/change/reposition/reinsertion/upsize   Final Result by Ivet Harkins MD (08/21 6330)   Impression:   Successful right breast abscess drain replacement and upsize to a 12 [place] drain after the skin tract was cauterized and injected with D-Stat thrombin gel due to a small superficial bleeding vessel just beneath the skin. PLAN: Return in 2 weeks for a tube check. Tube to ELIESER bulb suction and flush once a day with 10 cc normal saline solution. May resume Eliquis in 3 days if there is no further bleeding from the tube tract. Workstation performed: [id number]         IR drainage tube placement   Final Result by Ivet Harkins MD (08/18 4858)   Impression:   Successful placement of a 10 [place] catheter into a right breast abscess under ultrasound control, and 160 mL of tan purulent fluid was aspirated and a specimen sent to the lab as described above. PLAN: Tube to ELIESER bulb suction. Flush with 10 cc of normal saline solution q. day. Return in 2 weeks for tube check. Workstation performed: [id number]         MRI brain w wo contrast   Final Result by Son Braden MD (08/17 2115)   Motion degraded examination. 1. No acute infarction, edema, or pathologic intra-axial enhancement. 2. Mild chronic microangiopathic ischemic changes. Workstation performed: [id number]         MRA carotids wo and w contrast   Final Result by Son Braden MD (08/17 2121)      There is no evidence for a hemodynamically significant extracranial carotid stenosis. The cervical vertebral arteries are patent. Workstation performed: [id number]         MRA head wo contrast   Final Result by Son Braden MD (08/17 2118)      Cerebral MRA demonstrates no large vessel intracranial occlusion or focal stenosis.             Workstation performed: [id number]         CT stroke alert brain   Final Result by Foxwordy Chandan Antoine DO (08/17 1761)      No acute intracranial abnormality. Chronic microangiopathic changes. Findings were directly discussed with Shahnaz Morgan  at 8:37 a.m. on 8/17/2023. Workstation performed: [id number]         CT chest abdomen pelvis wo contrast   Final Result by Ricardo Ramirez MD (39/29 9652)      CT chest:      New trace bilateral pleural effusions and small pericardial effusion. Findings may be sequela of sepsis, third spacing, or pulmonary vascular congestion congestion. Trace bilateral lower lobe dependent subsegmental atelectasis slightly increased likely due to new pleural effusions. No airspace disease to suggest pneumonia. Incidental partially visualized right breast loculated fluid collection measuring at least 11 cm. Given history of right breast surgery, this may represent chronic seroma or hematoma. Additional chronic findings and negatives as above. CT abdomen and pelvis:      No evidence of acute abdominopelvic process. Colonic diverticulosis. Additional chronic findings and negatives as above. This study demonstrates a significant  finding and was documented as such in [facility] for liaison and referring practitioner notification. Workstation performed: [id number]         XR chest portable   Final Result by Helio Grier MD (08/17 1515)      Trace bilateral pleural effusions with subjacent atelectasis. Workstation performed: [id number]         US kidney and bladder   Final Result by Drew Jimenez DO (08/15 1617)      Unremarkable kidneys without evidence of hydronephrosis. Workstation performed: [id number]         CT chest wo contrast   Final Result by Rosa Talley MD (08/14 1151)      No definite pneumonia. Mild septal thickening suggestive of interstitial edema. Dependent atelectasis in the lower lobes.       Several subcentimeter nodules, stable since March 2023, likely stable since March 2022 although that exam is compromised by motion. Workstation performed: [id number]         XR chest portable   Final Result by Cnocha Nash MD (08/11 1402)      Right base infiltrate and/or atelectasis                  Workstation performed: [id number]         IR drainage tube check/change/reposition/reinsertion/upsize    (Results Pending)       Portions of the record may have been created with voice recognition software. Occasional wrong word or "sound a like" substitutions may have occurred due to the inherent limitations of voice recognition software. Read the chart carefully and recognize, using context, where substitutions have occurred.

## 2023-08-22 NOTE — PROGRESS NOTES
Progress Note - Infectious Disease   Mateus Smith 72 y.o. female MRN: [id number]  Unit/Bed#: [address] [place] [address] Encounter: [id number]      Impression/Plan:  1. Severe sepsis, presenting with fever of 103 F, tachycardia, tachypnea and leukocytosis and now hypotension worsening. Initially presumed pneumonia. Continues to spike fever and now hypotension despite 6 days of IV cephalosporins. Blood cultures x 6, urinary antigens, RSV/FLU/COVID19 screen negative. 8/14/23 CT chest no definite pneumonia. procal 0.62. unclear source, possible aspiration. 8/16/23 CT with small pleural effusions. MRSA nares +, #2 suspicious source   -antibiotics as below  -monitor temperature and hemodynamics  -serial exam     2. Right breast abscess. In setting of #7   8/16/23 CT chest: right breast loculated fluid collection 11 cm  S/p IR aspiration of 160 mL of purulent fluid. Body fluid cx no growth though Vancomycin was started prior to drainage procedure.  -continues IV Vancomycin inhouse.  -upon discharge, can transition to Doxycycline 100 mg PO BID to complete total 14 day course post drain placement through 8/31/23  -follow up IR drainage output    3. Encephalopathy. With increased lethargy/altered mental status. Consider aspiration. Consider medication side effect. 8/17/23 CT brain: no acute intracranial abnormality. 8/18/23 MRI/MRA Head/Neck - Stroke work up negative to date. Mentation much improved, close to baseline  -discontinued Cefepime 8/17/23  -Management/work-up as above  -follow swallow   -aspiration precautions as needed     4. SEBASTIAN on CKD. Peak creatinine 2.91 > 2.02. Baseline 1.7 8/21/23 Vancomycin level 17.6  -renal dose adjust antibiotic as needed  -volume management per nephrology  -Vancomycin dosing per pharmacy  -recheck BMP     5. Type 2 diabetes mellitus. Hemoglobin A1c on 8/13/2023 9.3. Risk factor for infection  -Blood glucose management per primary care team     6. Morbid Obesity. BMI 47.9.  Can affect antibiotic absorption/dosing     7. 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 Radiation was to start at [facility] 2023. Inframammary bilateral intertrigo with powder applied. 23 CT chest: right breast loculated fluid collection 11cm  -serial exam  -antibiotic as above  -drain management per IR as above  -follow up with oncology outpatient.     Antibiotics:  Vancomycin D5 post drain placement     Above impression and plan discussed in detail with patient, RN, , and Dr. Jocelyn Joaquin of the primary care team.  Disposition planning in progress to [facility]. Subjective:  Patient has declining temperature since addition of Vancomycin, no reported chills, sweats; no nausea, vomiting, diarrhea; no cough, shortness of breath; + right breast tenderness much improved on exam. Patient does not recall much of events last week. Objective:  Vitals:  Temp:  [97.4 °F (36.3 °C)-97.8 °F (36.6 °C)] 97.8 °F (36.6 °C)  HR:  [61-64] 63  Resp:  [20] 20  BP: (134-156)/(55-79) 148/74  SpO2:  [96 %-98 %] 98 %  Temp (24hrs), Av.6 °F (36.4 °C), Min:97.4 °F (36.3 °C), Max:97.8 °F (36.6 °C)  Current: Temperature: 97.8 °F (36.6 °C)    Physical Exam:   General Appearance:  72year old female, chronically debilitated, propped in bed, alert and oriented, no acute distress. HEENT: Atraumatic normocephalic   Throat: Oropharynx moist.   Pulmonary:   Decreased breath sounds, Normal respiratory excursion without accessory muscle use, statting 98% on 2L NCO2   Cardiac:  RRR   Abdomen:   Soft, non-tender, protuberant pannus, + bowel sounds   Extremities: No edema   : Purewick with clear, yellow urine in canister, no SPT   Psychiatric: Awake, cooperative   Skin: No new rashes. IV site nontender. Inframammary intertrigo drying with powder applied. Right breast ELIESER drain with mild cloudy output, right breast much less induration.            Labs, Imaging, & Other studies:   All pertinent labs and imaging studies were personally reviewed  Results from last 7 days   Lab Units 08/22/23  0415 08/21/23  0606 08/20/23  1600 08/20/23  0511   WBC Thousand/uL 9.59 9.75  --  10.96*   HEMOGLOBIN g/dL 8.5* 8.1* 8.0* 8.1*   PLATELETS Thousands/uL 280 294  --  295     Results from last 7 days   Lab Units 08/22/23  0415 08/21/23  0606 08/20/23  0511 08/17/23  0809 08/16/23  1322   SODIUM mmol/L 142 142 146   < > 139   POTASSIUM mmol/L 4.7 4.8 5.4*   < > 4.7   CHLORIDE mmol/L 109* 110* 112*   < > 103   CO2 mmol/L 28 28 29   < > 28   BUN mg/dL 67* 72* 67*   < > 67*   CREATININE mg/dL 2.07* 2.38* 2.22*   < > 2.71*   EGFR ml/min/1.73sq m 23 19 21   < > 17   CALCIUM mg/dL 8.7 8.7 9.0   < > 8.8   AST U/L  --   --   --   --  9*   ALT U/L  --   --   --   --  8   ALK PHOS U/L  --   --   --   --  43    < > = values in this interval not displayed. Results from last 7 days   Lab Units 08/18/23  1424 08/16/23  1725 08/16/23  1349 08/16/23  1339 08/16/23  1322   BLOOD CULTURE   --   --  No Growth After 5 Days. --  No Growth After 5 Days. GRAM STAIN RESULT  1+ Polys  No bacteria seen  --   --   --   --    BODY FLUID CULTURE, STERILE  No growth  --   --   --   --    MRSA CULTURE ONLY   --  1+ Growth of Methicillin Resistant Staphylococcus aureus*  --  Methicillin Resistant Staphylococcus aureus isolated*  This patient requires contact isolation precautions per [facility] law. Contact precautions are not required in [place] for nasal surveillance cultures.   --      Results from last 7 days   Lab Units 08/18/23  0543 08/17/23  0547 08/16/23  1322   PROCALCITONIN ng/ml 0.40* 0.56* 0.62*         Results from last 7 days   Lab Units 08/16/23  2165   FERRITIN ng/mL 168

## 2023-08-22 NOTE — PLAN OF CARE
Problem: RESPIRATORY - ADULT  Goal: Achieves optimal ventilation and oxygenation  Description: INTERVENTIONS:  - Assess for changes in respiratory status  - Assess for changes in mentation and behavior  - Position to facilitate oxygenation and minimize respiratory effort  - Oxygen administered by appropriate delivery if ordered  - Initiate smoking cessation education as indicated  - Encourage broncho-pulmonary hygiene including cough, deep breathe, Incentive Spirometry  - Assess the need for suctioning and aspirate as needed  - Assess and instruct to report SOB or any respiratory difficulty  - Respiratory Therapy support as indicated  Outcome: Progressing     Problem: INFECTION - ADULT  Goal: Absence or prevention of progression during hospitalization  Description: INTERVENTIONS:  - Assess and monitor for signs and symptoms of infection  - Monitor lab/diagnostic results  - Monitor all insertion sites, i.e. indwelling lines, tubes, and drains  - Monitor endotracheal if appropriate and nasal secretions for changes in amount and color  - [place] appropriate cooling/warming therapies per order  - Administer medications as ordered  - Instruct and encourage patient and family to use good hand hygiene technique  - Identify and instruct in appropriate isolation precautions for identified infection/condition  Outcome: Progressing     Problem: SAFETY ADULT  Goal: Patient will remain free of falls  Description: INTERVENTIONS:  - Educate patient/family on patient safety including physical limitations  - Instruct patient to call for assistance with activity   - Consult OT/PT to assist with strengthening/mobility   - Keep Call bell within reach  - Keep bed low and locked with side rails adjusted as appropriate  - Keep care items and personal belongings within reach  - Initiate and maintain comfort rounds  - Make Fall Risk Sign visible to staff  - Offer Toileting every 2 Hours, in advance of need  - Initiate/Maintain bed alarm  - Obtain necessary fall risk management equipment:   - Apply yellow socks and bracelet for high fall risk patients  - Consider moving patient to room near nurses station  Outcome: Progressing  Goal: Maintain or return to baseline ADL function  Description: INTERVENTIONS:  -  Assess patient's ability to carry out ADLs; assess patient's baseline for ADL function and identify physical deficits which impact ability to perform ADLs (bathing, care of mouth/teeth, toileting, grooming, dressing, etc.)  - Assess/evaluate cause of self-care deficits   - Assess range of motion  - Assess patient's mobility; develop plan if impaired  - Assess patient's need for assistive devices and provide as appropriate  - Encourage maximum independence but intervene and supervise when necessary  - Involve family in performance of ADLs  - Assess for home care needs following discharge   - Consider OT consult to assist with ADL evaluation and planning for discharge  - Provide patient education as appropriate  Outcome: Progressing  Goal: Maintains/Returns to pre admission functional level  Description: INTERVENTIONS:  - Perform BMAT or MOVE assessment daily.   - Set and communicate daily mobility goal to care team and patient/family/caregiver. - Collaborate with rehabilitation services on mobility goals if consulted  - Perform Range of Motion 2 times a day. - Reposition patient every 2 hours.   - Dangle patient 2 times a day  - Stand patient 2 times a day  - Out of bed to chair 2 times a day   - Out of bed for meals 2 times a day  - Out of bed for toileting  - Record patient progress and toleration of activity level   Outcome: Progressing     Problem: DISCHARGE PLANNING  Goal: Discharge to home or other facility with appropriate resources  Description: INTERVENTIONS:  - Identify barriers to discharge w/patient and caregiver  - Arrange for needed discharge resources and transportation as appropriate  - Identify discharge learning needs (meds, wound care, etc.)  - Arrange for interpretive services to assist at discharge as needed  - Refer to Case Management Department for coordinating discharge planning if the patient needs post-hospital services based on physician/advanced practitioner order or complex needs related to functional status, cognitive ability, or social support system  Outcome: Progressing     Problem: Knowledge Deficit  Goal: Patient/family/caregiver demonstrates understanding of disease process, treatment plan, medications, and discharge instructions  Description: Complete learning assessment and assess knowledge base.   Interventions:  - Provide teaching at level of understanding  - Provide teaching via preferred learning methods  Outcome: Progressing     Problem: CARDIOVASCULAR - ADULT  Goal: Maintains optimal cardiac output and hemodynamic stability  Description: INTERVENTIONS:  - Monitor I/O, vital signs and rhythm  - Monitor for S/S and trends of decreased cardiac output  - Administer and titrate ordered vasoactive medications to optimize hemodynamic stability  - Assess quality of pulses, skin color and temperature  - Assess for signs of decreased coronary artery perfusion  - Instruct patient to report change in severity of symptoms  Outcome: Progressing  Goal: Absence of cardiac dysrhythmias or at baseline rhythm  Description: INTERVENTIONS:  - Continuous cardiac monitoring, vital signs, obtain 12 lead EKG if ordered  - Administer antiarrhythmic and heart rate control medications as ordered  - Monitor electrolytes and administer replacement therapy as ordered  Outcome: Progressing     Problem: METABOLIC, FLUID AND ELECTROLYTES - ADULT  Goal: Electrolytes maintained within normal limits  Description: INTERVENTIONS:  - Monitor labs and assess patient for signs and symptoms of electrolyte imbalances  - Administer electrolyte replacement as ordered  - Monitor response to electrolyte replacements, including repeat lab results as appropriate  - Instruct patient on fluid and nutrition as appropriate  Outcome: Progressing  Goal: Fluid balance maintained  Description: INTERVENTIONS:  - Monitor labs   - Monitor I/O and WT  - Instruct patient on fluid and nutrition as appropriate  - Assess for signs & symptoms of volume excess or deficit  Outcome: Progressing  Goal: Glucose maintained within target range  Description: INTERVENTIONS:  - Monitor Blood Glucose as ordered  - Assess for signs and symptoms of hyperglycemia and hypoglycemia  - Administer ordered medications to maintain glucose within target range  - Assess nutritional intake and initiate nutrition service referral as needed  Outcome: Progressing     Problem: SKIN/TISSUE INTEGRITY - ADULT  Goal: Skin Integrity remains intact(Skin Breakdown Prevention)  Description: Assess:  -Perform Hung assessment every shift  -Clean and moisturize skin every 2 hours and as needed  -Inspect skin when repositioning, toileting, and assisting with ADLS  -Assess under medical devices such as oxygen tubing every 2 hours  -Assess extremities for adequate circulation and sensation     Bed Management:  -Have minimal linens on bed & keep smooth, unwrinkled  -Change linens as needed when moist or perspiring  -Avoid sitting or lying in one position for more than 2 hours while in bed  -Keep HOB at 30degrees     Toileting:  -Offer bedside commode  -Assess for incontinence every 2 hours  -Use incontinent care products after each incontinent episode such as pads and external urinary catheter    Activity:  -Mobilize patient 2 times a day  -Encourage activity and walks on unit  -Encourage or provide ROM exercises   -Turn and reposition patient every 2 Hours  -Use appropriate equipment to lift or move patient in bed  -Instruct/ Assist with weight shifting every 2 hours when out of bed in chair  -Consider limitation of chair time 2 hour intervals    Skin Care:  -Avoid use of baby powder, tape, friction and shearing, hot water or constrictive clothing  -Relieve pressure over bony prominences using pillows  -Do not massage red bony areas      Outcome: Progressing  Goal: Incision(s), wounds(s) or drain site(s) healing without S/S of infection  Description: INTERVENTIONS  - Assess and document dressing, incision, wound bed, drain sites and surrounding tissue  - Provide patient and family education  - Perform skin care/dressing changes every day and as needed  Outcome: Progressing  Goal: Pressure injury heals and does not worsen  Description: Interventions:  - Implement low air loss mattress or specialty surface (Criteria met)  - Apply silicone foam dressing  - Instruct/assist with weight shifting every 120 minutes when in chair   - Limit chair time to 2 hour intervals  - Use special pressure reducing interventions such as EHOB  when in chair   - Apply fecal or urinary incontinence containment device   - Perform passive or active ROM every 2 hours  - Turn and reposition patient & offload bony prominences every 2 hours   - Utilize friction reducing device or surface for transfers   - Consider consults to  interdisciplinary teams such as PT  - Use incontinent care products after each incontinent episode such as pads  - Consider nutrition services referral as needed  Outcome: Progressing     Problem: MOBILITY - ADULT  Goal: Maintain or return to baseline ADL function  Description: INTERVENTIONS:  -  Assess patient's ability to carry out ADLs; assess patient's baseline for ADL function and identify physical deficits which impact ability to perform ADLs (bathing, care of mouth/teeth, toileting, grooming, dressing, etc.)  - Assess/evaluate cause of self-care deficits   - Assess range of motion  - Assess patient's mobility; develop plan if impaired  - Assess patient's need for assistive devices and provide as appropriate  - Encourage maximum independence but intervene and supervise when necessary  - Involve family in performance of ADLs  - Assess for home care needs following discharge   - Consider OT consult to assist with ADL evaluation and planning for discharge  - Provide patient education as appropriate  Outcome: Progressing  Goal: Maintains/Returns to pre admission functional level  Description: INTERVENTIONS:  - Perform BMAT or MOVE assessment daily.   - Set and communicate daily mobility goal to care team and patient/family/caregiver. - Collaborate with rehabilitation services on mobility goals if consulted  - Perform Range of Motion 2 times a day. - Reposition patient every 2 hours. - Dangle patient 2 times a day  - Stand patient 2 times a day  - Out of bed to chair 2 times a day   - Out of bed for meals 2 times a day  - Out of bed for toileting  - Record patient progress and toleration of activity level   Outcome: Progressing     Problem: Prexisting or High Potential for Compromised Skin Integrity  Goal: Skin integrity is maintained or improved  Description: INTERVENTIONS:  - Identify patients at risk for skin breakdown  - Assess and monitor skin integrity  - Assess and monitor nutrition and hydration status  - Monitor labs   - Assess for incontinence   - Turn and reposition patient  - Assist with mobility/ambulation  - Relieve pressure over bony prominences  - Avoid friction and shearing  - Provide appropriate hygiene as needed including keeping skin clean and dry  - Evaluate need for skin moisturizer/barrier cream  - Collaborate with interdisciplinary team   - Patient/family teaching  - Consider wound care consult   Outcome: Progressing     Problem: Nutrition/Hydration-ADULT  Goal: Nutrient/Hydration intake appropriate for improving, restoring or maintaining nutritional needs  Description: Monitor and assess patient's nutrition/hydration status for malnutrition. Collaborate with interdisciplinary team and initiate plan and interventions as ordered. Monitor patient's weight and dietary intake as ordered or per policy.  Utilize nutrition screening tool and intervene as necessary. Determine patient's food preferences and provide high-protein, high-caloric foods as appropriate.      INTERVENTIONS:  - Monitor oral intake, urinary output, labs, and treatment plans  - Assess nutrition and hydration status and recommend course of action  - Evaluate amount of meals eaten  - Assist patient with eating if necessary   - Allow adequate time for meals  - Recommend/ encourage appropriate diets, oral nutritional supplements, and vitamin/mineral supplements  - Order, calculate, and assess calorie counts as needed  -- Assess need for intravenous fluids  - Provide specific nutrition/hydration education as appropriate  - Include patient/family/caregiver in decisions related to nutrition  Outcome: Progressing

## 2023-08-22 NOTE — ASSESSMENT & PLAN NOTE
Lab Results   Component Value Date    HGBA1C 9.2 (H) 08/18/2023       Recent Labs     08/21/23  1643 08/21/23  2045 08/22/23  0212 08/22/23  0716   POCGLU 115 107 94 99       Blood Sugar Average: Last 72 hrs:  (P) [id number]   · Continue 25 units of lantus insulin at bedtime, 5 units of Humalog 3 times daily with meals and insulin sliding scale  · Patient is on diabetic diet

## 2023-08-22 NOTE — PROGRESS NOTES
Frankie Armstrong is a 72 y.o. female who is currently ordered Vancomycin IV with management by the Pharmacy Consult service. Relevant clinical data and objective / subjective history reviewed. Vancomycin Assessment:  Indication and Goal AUC/Trough: Pneumonia (goal -600, trough >10), -600, trough >10  Clinical Status: stable  Micro:   MRSA+  Renal Function:  SCr: 2.07 mg/dL  CrCl: 30.7 mL/min  Renal replacement: Not on dialysis  Days of Therapy: 7  Current Dose: 1000mg IV QD T<15  Vancomycin Plan:  New Dosinmg IV QD T<15  Estimated AUC: 400-600 mcg*hr/mL  Estimated Trough: >10 mcg/mL  Next Level: 23 @ 0600  Renal Function Monitoring: Daily BMP and [place] will continue to follow closely for s/sx of nephrotoxicity, infusion reactions and appropriateness of therapy. BMP and CBC will be ordered per protocol. We will continue to follow the patient’s culture results and clinical progress daily.     Fercho Lazo, Pharmacist

## 2023-08-22 NOTE — SPEECH THERAPY NOTE
SLP Progress Note    Patient Name: Anitra HAMPTON Date: 8/22/2023    Subjective:  "I take my time" re: chewing/swallowing. Objective:  Pt was seen for diagnostic dysphagia therapy to assess potential for diet upgrade. Pt was fully alert and upright. Pt was assessed with applesauce, chicken salad, hard pretzels and thin liquid . Mastication was cautious and effective. Bolus formation and transfer were effective. Swallow initiation appeared prompt. Laryngeal rise was good by palpation. No coughing, throat clearing, c/o stasis, multiple swallows, change in vocal quality noted c po intake today. Assessment:  MS improved and no s/s oropharyngeal dysphagia. Plan/Recommendations:  Recommend regular textured food, thin liquid (continue CCD)  No additional SLP  Dysphagia f/u indicated.      Evelin Gonzalez [address] [place] [address] [phone]

## 2023-08-22 NOTE — ASSESSMENT & PLAN NOTE
Patient noted to have edema with erythematous skin on the right lateral breast  CAT scan of the chest from 8/16/2023 with loculated fluid collection measuring 11 cm  IR was consulted-patient underwent drainage of 160 mL of purulent fluid from the right breast with placement of catheter. Postprocedure patient developed significant bleeding. Area was cauterized and thrombin gel injected to stop the bleeding and tube was upsized to a 12 [place] tube. Eliquis has been on hold. Breast drain with 7.5 Ml over the past 24 hours. Continue car per IR recs.

## 2023-08-22 NOTE — ASSESSMENT & PLAN NOTE
· Initially held metoprolol and Demadex  · Blood pressure improved, metoprolol resumed  · Patient appeared volume overloaded with +4 pitting edema bilateral lower extremities, received one-time dose of Lasix 20 mg IV on 8/12, 40 mg on 8/15 and 8/16 with improved creatinine  · Later Demadex and Lasix were discontinued as patient became hypotensive on 8/16/2023. Patient received IV fluids on 8/16/2023 which were later discontinued. · Blood pressure is much better. Metoprolol dose was increased to 50 mg p.o. twice daily.     · Resume torsemide 10 mg p.o. daily

## 2023-08-22 NOTE — DISCHARGE INSTR - AVS FIRST PAGE
Plan:  Tube to ELIESER bulb suction. Tube check in 2 weeks with IR. Flush qd with 10cc NSS.     Drain placed on 8/18/23  You have been on IV vancomycin while in the hospital  Continue taking doxycycline 100 mg twice daily for 9 more days to complete 2 week course of antibiotics  F/U with IR to have drain removed in 2 weeks    Your toprol xl has been stopped you have been started on lopressor 50 mg twice daily  Also start taking iron tablet daily for anemia with stool softners    Your torsemide has been decreased to 10 mg daily  Follow up with nephrology on discharge

## 2023-08-22 NOTE — PLAN OF CARE
Problem: RESPIRATORY - ADULT  Goal: Achieves optimal ventilation and oxygenation  Description: INTERVENTIONS:  - Assess for changes in respiratory status  - Assess for changes in mentation and behavior  - Position to facilitate oxygenation and minimize respiratory effort  - Oxygen administered by appropriate delivery if ordered  - Initiate smoking cessation education as indicated  - Encourage broncho-pulmonary hygiene including cough, deep breathe, Incentive Spirometry  - Assess the need for suctioning and aspirate as needed  - Assess and instruct to report SOB or any respiratory difficulty  - Respiratory Therapy support as indicated  Outcome: Progressing     Problem: CARDIOVASCULAR - ADULT  Goal: Maintains optimal cardiac output and hemodynamic stability  Description: INTERVENTIONS:  - Monitor I/O, vital signs and rhythm  - Monitor for S/S and trends of decreased cardiac output  - Administer and titrate ordered vasoactive medications to optimize hemodynamic stability  - Assess quality of pulses, skin color and temperature  - Assess for signs of decreased coronary artery perfusion  - Instruct patient to report change in severity of symptoms  Outcome: Progressing  Goal: Absence of cardiac dysrhythmias or at baseline rhythm  Description: INTERVENTIONS:  - Continuous cardiac monitoring, vital signs, obtain 12 lead EKG if ordered  - Administer antiarrhythmic and heart rate control medications as ordered  - Monitor electrolytes and administer replacement therapy as ordered  Outcome: Progressing     Problem: METABOLIC, FLUID AND ELECTROLYTES - ADULT  Goal: Electrolytes maintained within normal limits  Description: INTERVENTIONS:  - Monitor labs and assess patient for signs and symptoms of electrolyte imbalances  - Administer electrolyte replacement as ordered  - Monitor response to electrolyte replacements, including repeat lab results as appropriate  - Instruct patient on fluid and nutrition as appropriate  Outcome: Progressing  Goal: Fluid balance maintained  Description: INTERVENTIONS:  - Monitor labs   - Monitor I/O and WT  - Instruct patient on fluid and nutrition as appropriate  - Assess for signs & symptoms of volume excess or deficit  Outcome: Progressing  Goal: Glucose maintained within target range  Description: INTERVENTIONS:  - Monitor Blood Glucose as ordered  - Assess for signs and symptoms of hyperglycemia and hypoglycemia  - Administer ordered medications to maintain glucose within target range  - Assess nutritional intake and initiate nutrition service referral as needed  Outcome: Progressing     Problem: SKIN/TISSUE INTEGRITY - ADULT  Goal: Skin Integrity remains intact(Skin Breakdown Prevention)  Description: Assess:  -Perform Hung assessment every shift  -Clean and moisturize skin every 2 hours and as needed  -Inspect skin when repositioning, toileting, and assisting with ADLS  -Assess under medical devices such as oxygen tubing every 2 hours  -Assess extremities for adequate circulation and sensation     Bed Management:  -Have minimal linens on bed & keep smooth, unwrinkled  -Change linens as needed when moist or perspiring  -Avoid sitting or lying in one position for more than 2 hours while in bed  -Keep HOB at 30degrees     Toileting:  -Offer bedside commode  -Assess for incontinence every 2 hours  -Use incontinent care products after each incontinent episode such as pads and external urinary catheter    Activity:  -Mobilize patient 2 times a day  -Encourage activity and walks on unit  -Encourage or provide ROM exercises   -Turn and reposition patient every 2 Hours  -Use appropriate equipment to lift or move patient in bed  -Instruct/ Assist with weight shifting every 2 hours when out of bed in chair  -Consider limitation of chair time 2 hour intervals    Skin Care:  -Avoid use of baby powder, tape, friction and shearing, hot water or constrictive clothing  -Relieve pressure over bony prominences using pillows  -Do not massage red bony areas      Outcome: Progressing  Goal: Incision(s), wounds(s) or drain site(s) healing without S/S of infection  Description: INTERVENTIONS  - Assess and document dressing, incision, wound bed, drain sites and surrounding tissue  - Provide patient and family education  - Perform skin care/dressing changes every day and as needed  Outcome: Progressing  Goal: Pressure injury heals and does not worsen  Description: Interventions:  - Implement low air loss mattress or specialty surface (Criteria met)  - Apply silicone foam dressing  - Instruct/assist with weight shifting every 120 minutes when in chair   - Limit chair time to 2 hour intervals  - Use special pressure reducing interventions such as EHOB  when in chair   - Apply fecal or urinary incontinence containment device   - Perform passive or active ROM every 2 hours  - Turn and reposition patient & offload bony prominences every 2 hours   - Utilize friction reducing device or surface for transfers   - Consider consults to  interdisciplinary teams such as PT  - Use incontinent care products after each incontinent episode such as pads  - Consider nutrition services referral as needed  Outcome: Progressing     Problem: INFECTION - ADULT  Goal: Absence or prevention of progression during hospitalization  Description: INTERVENTIONS:  - Assess and monitor for signs and symptoms of infection  - Monitor lab/diagnostic results  - Monitor all insertion sites, i.e. indwelling lines, tubes, and drains  - Monitor endotracheal if appropriate and nasal secretions for changes in amount and color  - [place] appropriate cooling/warming therapies per order  - Administer medications as ordered  - Instruct and encourage patient and family to use good hand hygiene technique  - Identify and instruct in appropriate isolation precautions for identified infection/condition  Outcome: Progressing

## 2023-08-22 NOTE — ASSESSMENT & PLAN NOTE
· Patient has been on Eliquis which has been on hold as patient had significant bleeding from the breast after placement of drain. Hemoglobin initially dropped slightly but has been stable. · Continue eliquis on d/c  · Continue metoprolol  · Patient had couple of episodes of atrial fibrillation with rapid ventricular late on the night of 8/18/2023 needing as needed IV Lopressor  · Metoprolol dose increased to 50 mg p.o. twice daily. Patient currently in sinus rhythm.

## 2023-08-22 NOTE — DISCHARGE SUMMARY
[address]  Discharge- Herb Murphy 1950, 72 y.o. female MRN: [id number]  Unit/Bed#: 913 [facility] 405-01 Encounter: [id number]  Primary Care Provider: Madelene Cockayne, MD   Date and time admitted to hospital: 8/11/2023 12:10 PM    * Septic shock [facility])  Assessment & Plan  · As evidenced by fever, tachycardia, tachypnea, elevated white count  · Initially patient presumed to have pneumonia. Chest x-ray on 8/11/2023 concerning for right lower lobe infiltrate. CT chest on 8/14/2023 with bibasilar atelectasis. Repeat chest x-ray showed bibasilar ectasis versus infiltrates  · Patient initially had been placed on ceftriaxone, broadened antibiotics to cefepime on 8/15 secondary to fever spike. Patient had another fever spike with hypotensive and tachycardia on 8/16/2023 and patient received 30 mill per KG fluid bolus and was transferred to ICU  · CT chest abdomen pelvis rule out occult source of infection was ordered on 8/16/2023 which showed new trace bilateral pleural effusions with small pericardial effusion with bilateral lower lobe dependent atelectasis with 11 cm right breast loculated fluid collection with colonic diverticulosis  · Procalcitonin level is minimally elevated  · MRSA surveillance was positive  · Patient was later changed to cefepime and vancomycin. Cefepime was later discontinued due to concern for encephalopathy  · Blood cultures from admission from 8/11/2023 have been negative. Repeat blood cultures from 8/16/2023 have also been negative. · Body fluid culture from the breast with no growth  · Continue vancomycin with pharmacy consultation for dosing  · ID input appreciated  · Will d/c on doxycycline 100 mg bid for 9 more days to complete 14 days of abx.       Breast abscess of female  Assessment & Plan  Patient noted to have edema with erythematous skin on the right lateral breast  CAT scan of the chest from 8/16/2023 with loculated fluid collection measuring 11 cm  IR was consulted-patient underwent drainage of 160 mL of purulent fluid from the right breast with placement of catheter. Postprocedure patient developed significant bleeding. Area was cauterized and thrombin gel injected to stop the bleeding and tube was upsized to a 12 [place] tube. Eliquis has been on hold. Breast drain with 7.5 Ml over the past 24 hours. Eliquis resumed 8/21/23  Hg stable    Ductal carcinoma in situ (DCIS) of right breast  Assessment & Plan  · Diagnosed in March 2023  · Status postlumpectomy on 5/30/2023  · Radiation was to start 8/16 at [facility]  · Outpatient follow-up with oncology    Type 2 diabetes mellitus with hyperglycemia, without long-term current use of insulin [facility])  Assessment & Plan  Lab Results   Component Value Date    HGBA1C 9.2 (H) 08/18/2023       Recent Labs     08/21/23  1643 08/21/23  2045 08/22/23  0212 08/22/23  0716   POCGLU 115 107 94 99       Blood Sugar Average: Last 72 hrs:  (P) 158.0539081153159425   · Continue 25 units of lantus insulin at bedtime, 5 units of Humalog 3 times daily with meals and insulin sliding scale  · Patient is on diabetic diet    Chronic respiratory failure with hypoxia and hypercapnia (HCC)  Assessment & Plan  · Patient wears oxygen at home at night with her CPAP but is not oxygen dependent during the day  · Keep O2 sats greater than 92%  · Currently stable on 2 L with O2 saturation in mid 90s.   · Continue CPAP nightly  · Encourage incentive spirometry and pulmonary toilet    KATRINA (obstructive sleep apnea)  Assessment & Plan  · Continue CPAP 7 cm water hs    Toxic metabolic encephalopathy  Assessment & Plan  · Toxic metabolic encephalopathy secondary to sepsis/SEBASTIAN with contribution from cefepime  · On morning of 8/16 patient's mentation noted to be slowed, patient reported feeling foggy  · ABG performed pH 7.392, PCO2 45.9, PO2 74.6 and bicarb 26.0  · Patient also was hypotensive and received fluid bolus  · Patient later also had some expressive/receptive aphasia with some mild upper extremity weakness on 8/17/2023. Stroke alert was called and patient had a CAT scan of the brain which was negative for any acute findings. CTA could not be performed due to CKD. · MRI of the brain showed no acute infarction edema or pathological enhancement  · MRA head showed no large vessel occlusion or focal stenosis. · MRA of the carotids without any hemodynamically significant stenosis  · HemoGlobin A1c was 9.2  · Felt to be secondary to sepsis and acute kidney injury. Continue Eliquis and statin as per neurology. · Mental status has improved and is close to baseline. Acute kidney injury superimposed on chronic kidney disease [facility])  Assessment & Plan  Lab Results   Component Value Date    EGFR 23 08/22/2023    EGFR 19 08/21/2023    EGFR 21 08/20/2023    CREATININE 2.07 (H) 08/22/2023    CREATININE 2.38 (H) 08/21/2023    CREATININE 2.22 (H) 08/20/2023   · Suspected secondary to ATN in the setting of infection with hypotension and also possible contribution from cardio renal syndrome  · Creatinine baseline is 1.5-1.8  · Creatinine upon admission was 2.9   · Renal ultrasound showed no evidence of hydronephrosis  · Patient initially received few dose of IV Lasix which were later discontinued and patient received IV fluids and patient became hypotensive on 8/16/2023 following which IV fluids have been on hold  · Creatinine has creased to 1.6 but has again increased to 2.2-2.3.   Diuretics have been on hold for the past few days  · Nephrology has been following  · Echo cardiogram shows EF 55%  · Patient to be restarted back on Demadex 10 mg p.o. daily  · F/U with nephrology as outpatient    Constipation  Assessment & Plan  Patient did not have a bowel movement in 3 days and complaining of some abdominal discomfort and nausea   patient started MiraLAX 17 g p.o. daily and was given Dulcolax suppository without bowel movement  Patient also had on senna and is having good bowel movements. Essential hypertension  Assessment & Plan  · Initially held metoprolol and Demadex  · Blood pressure improved, metoprolol resumed  · Patient appeared volume overloaded with +4 pitting edema bilateral lower extremities, received one-time dose of Lasix 20 mg IV on 8/12, 40 mg on 8/15 and 8/16 with improved creatinine  · Later Demadex and Lasix were discontinued as patient became hypotensive on 8/16/2023. Patient received IV fluids on 8/16/2023 which were later discontinued. · Blood pressure is much better. Metoprolol dose was increased to 50 mg p.o. twice daily. · Continue torsemide 10 mg p.o. daily    Anemia  Assessment & Plan  Baseline hemoglobin has been in the range of 10 which dropped to 8.1  No evidence of GI bleeding or blood in the urine  Likely secondary to acute blood loss bleeding from the breast after the placement of breast drain  Hemoglobin dropped slightly but has been stable in the 8 range. Resume Eliquis with a follow-up hemoglobin    Mixed hyperlipidemia  Assessment & Plan  Continue Lipitor    Paroxysmal atrial fibrillation [facility])  Assessment & Plan  · Patient has been on Eliquis which has been on hold as patient had significant bleeding from the breast after placement of drain. Hemoglobin initially dropped slightly but has been stable. Will resume Eliquis  · Continue metoprolol  · Patient had couple of episodes of atrial fibrillation with rapid ventricular late on the night of 8/18/2023 needing as needed IV Lopressor  · Metoprolol dose increased to 50 mg p.o. twice daily. Patient currently in sinus rhythm.     Morbid obesity with BMI of 45.0-49.9, adult [facility])  Assessment & Plan  · Dietary lifestyle modifications    Moderate persistent asthma without complication  Assessment & Plan  Continue Mil Larsen        Medical Problems     Resolved Problems  Date Reviewed: 8/22/2023   None       Discharging Physician / Practitioner: Jeannine Durand DO  PCP: Foreign Arreguin MD  Admission Date:   Admission Orders (From admission, onward)     Ordered        08/11/23 [facility]  Once                      Discharge Date: 08/22/23    Consultations During Hospital Stay:  · IR  · Neurology  · ID  · Nephrology  · Critical Care    Procedures Performed:   8/18/23 IR was consulted-patient underwent drainage of 160 mL of purulent fluid from the right breast with placement of catheter. Postprocedure patient developed significant bleeding. Area was cauterized and thrombin gel injected to stop the bleeding and tube was upsized to a 12 [place] tube. Significant Findings / Test Results:     8/16/23 CT chest: right breast loculated fluid collection 11 cm  S/p IR aspiration of 160 mL of purulent fluid. Body fluid cx no growth to date  -continues IV Vancomycin  -follow up IR drainage and cultures  -serial exam      MRA Head/neck  IMPRESSION:     There is no evidence for a hemodynamically significant extracranial carotid stenosis.     The cervical vertebral arteries are patent. MRI brain  IMPRESSION:  Motion degraded examination.     1. No acute infarction, edema, or pathologic intra-axial enhancement.     2. Mild chronic microangiopathic ischemic changes.       Incidental Findings:   · See above   · I reviewed the above mentioned incidental findings with the patient and/or family and they expressed understanding. Test Results Pending at Discharge (will require follow up):   · n/a     Outpatient Tests Requested:  · BMP in 1 week    Complications:  Post procedure bleeding    Reason for Admission: Sepsis    History of Present Illness:     Debe Crigler is a 72 y.o. female with a PMH of atrial fibrillation, diabetes, chronic kidney disease, newly diagnosed breast cancer, obstructive sleep apnea who presents with altered mental status. Patient was brought in by her niece because she was acting confused this morning.   Patient states she has been feeling short of breath for 2 days. This morning she woke up and she was having palpitations. She has history of paroxysmal atrial fibrillation and states that she felt similar to these episodes that she had in the past.  On arrival to the emergency department patient was found to have a fever of 103. She met sepsis criteria with fever, tachycardia, tachypnea and leukocytosis. Chest x-ray revealed a right lower lobe infiltrate. Patient had an episode of hypotension in the emergency department and received a sepsis bolus based on her ideal body weight. She is admitted for further management. Hospital Course:   Chandan Jin is a 72 y.o. female patient who originally presented to the hospital on 8/11/2023 due to altered mental status and found to be septic. Patient was initially treated per sepsis protocol for pneumonia. Patient continued to spike fevers later developing septic shock. Infectious disease was consulted and a CT chest was done showing right breast loculated fluid collection 11 cm. Patient underwent  IR aspiration of 160 mL of purulent fluid. Body fluid cx no growth to date. Patient has been on IV vancomycin for 7 days and has been cleared for discharge on p.o. doxycycline for 9 more days to complete 14 days of antibiotics after IR drainage. Patient is to follow-up with IR after antibiotics to have drain removed. During patient's hospitalization patient required brief stay in the ICU due to hypotension. Patient was given IV fluid hydration. Nephrology has evaluated patient for SEBASTIAN on CKD. Creatinine peaked at 2.9 and has come down to 2.07. Nephrology cleared patient for discharge on torsemide 10 mg daily. Patient's Toprol-XL has been changed to Lopressor 50 twice daily per nephrology recommendations. 81-year-old female with multiple medical problems being treated initially for sepsis secondary to pneumonia. Patient continued to have fevers and later developed septic shock.   Patient has history of breast cancer postlumpectomy. Patient also had acute kidney injury superimposed on CKD with possible fluid overload initially received Lasix. During hospital course patient also became hypotensive and was transferred to ICU and needed fluid boluses   At that point patient noted to have right breast abscess s/p drainage by IR. Known history of MRSA. Body fluid cultures have been negative. Patient currently on IV vancomycin. Creatinine slightly higher than baseline and patient was restarted back on home dose torsemide. ID recommending discharging on doxycycline for 14 days after IR drainage          Please see above list of diagnoses and related plan for additional information. Condition at Discharge: fair    Discharge Day Visit / Exam:   Subjective: Patient states that she feels well admits to having a bowel movement relieving her abdominal bloating this morning. Vitals: Blood Pressure: 148/74 (08/22/23 0752)  Pulse: 63 (08/22/23 0752)  Temperature: 97.8 °F (36.6 °C) (08/22/23 0752)  Temp Source: Temporal (08/19/23 0400)  Respirations: 20 (08/21/23 1903)  Height: 5' 2" (157.5 cm) (08/17/23 1000)  Weight - Scale: 120 kg (264 lb) (08/22/23 0600)  SpO2: 98 % (08/22/23 0752)  Exam:   Physical Exam  Vitals and nursing note reviewed. Constitutional:       General: She is not in acute distress. Appearance: She is well-developed. She is obese. HENT:      Head: Normocephalic and atraumatic. Eyes:      Conjunctiva/sclera: Conjunctivae normal.   Cardiovascular:      Rate and Rhythm: Normal rate and regular rhythm. Heart sounds: No murmur heard. Pulmonary:      Effort: Pulmonary effort is normal. No respiratory distress. Breath sounds: Normal breath sounds. Abdominal:      Palpations: Abdomen is soft. Tenderness: There is no abdominal tenderness. Musculoskeletal:         General: No swelling. Cervical back: Neck supple. Skin:     General: Skin is warm and dry. Capillary Refill: Capillary refill takes less than 2 seconds. Neurological:      Mental Status: She is alert. Mental status is at baseline. Psychiatric:         Mood and Affect: Mood normal.          Discussion with Family: Updated  (daughter) via phone. Discharge instructions/Information to patient and family:   See after visit summary for information provided to patient and family. Provisions for Follow-Up Care:  See after visit summary for information related to follow-up care and any pertinent home health orders. Disposition:   Other [address] [facility] at [facility] Readmission: no     Discharge Statement:  I spent 45 minutes discharging the patient. This time was spent on the day of discharge. I had direct contact with the patient on the day of discharge. Greater than 50% of the total time was spent examining patient, answering all patient questions, arranging and discussing plan of care with patient as well as directly providing post-discharge instructions. Additional time then spent on discharge activities. Discharge Medications:  See after visit summary for reconciled discharge medications provided to patient and/or family.       **Please Note: This note may have been constructed using a voice recognition system**

## 2023-08-23 ENCOUNTER — TELEPHONE (OUTPATIENT)
Dept: HEMATOLOGY ONCOLOGY | Facility: CLINIC | Age: 73
End: 2023-08-23

## 2023-08-23 LAB — B BURGDOR DNA SPEC QL NAA+PROBE: NEGATIVE

## 2023-08-23 NOTE — TELEPHONE ENCOUNTER
Appointment Schedule   Who are you speaking with? flo   If it is not the patient, are they listed on an active communication consent form? N/A   Which provider is the appointment scheduled with? Dr. Selvin Roche   At which location is the appointment scheduled for? [facility]   When is the appointment scheduled? Please list date and time 10/12/23 858   What is the reason for this appointment? f/u   Did patient voice understanding of the details of this appointment? Yes   Was the no show policy reviewed with patient?  Yes

## 2023-08-28 PROBLEM — E87.20 METABOLIC ACIDOSIS: Status: ACTIVE | Noted: 2023-01-01

## 2023-08-28 NOTE — RESPIRATORY THERAPY NOTE
Assisted with intubation. Size 7.5 ETT placed . 22 @ lip. Placement confirmed with [facility]CO@ and ascultation.

## 2023-08-28 NOTE — DISCHARGE INSTRUCTIONS
Drainage Tube Removal    Your drainage tube was removed today. What you need know at home:   Keep a clean dry dressing at the tube site until the small opening closes. It will take twenty four to forty eight hours. Keep the site dry until it heals. A small amount of drainage on your dressing is normal. Resume your normal diet. Small sips of flat soda will help with any nausea. Contact Interventional Radiology for any of the following: You have pain, fever greater than 101, shaking chills. If you have increased redness or swelling at the site. I the drainage from your site does not stop. If the site drains pus or has a bad odor.      Contact Interventional Radiology at [phone]   Kris PATIENTS: Contact Interventional Radiology at [phone]) Yamilet Thorpe PATIENTS: Contact Interventional Radiology at [phone]) if:

## 2023-08-28 NOTE — SEDATION DOCUMENTATION
Right breast drain checked, small residual collection noted, drain removed by Dr Núñez Early. Dressing applied. Pt discharged back to [facility] with transport.

## 2023-08-28 NOTE — ED NOTES
1835 etomidate given  1836 Lexx given   1837 7.5 tube placed at 22 at the lips  1840 18 [place] OG tube placed at 52 at the lips  1845 16 [place] tempt oneal placed     Fariha Vazquez RN  08/28/23 1925

## 2023-08-28 NOTE — ED PROVIDER NOTES
History  Chief Complaint   Patient presents with   • Cough     Pt arrives via EMS from [facility], diagnosed with PNA and receiving treatment, staff called EMS because they felt the patient seemed SOB, patient denies SOB, sp02 94% on room air for EMS, no respiratory distress noted     81-year-old female with history of asthma, atrial fibrillation, diabetes mellitus, HLD, HTN, SVT presents with shortness of breath. Patient acutely presented with 1-2 word sentences, tripod, short of breath with diaphoresis. Patient only able to give limited history due to acute condition. History limited by:  Severe respiratory distress   used: No        Prior to Admission Medications   Prescriptions Last Dose Informant Patient Reported? Taking? B Complex-C-Folic Acid (triphrocaps) 1 MG CAPS  Self No No   Sig: Take 1 capsule (1 mg total) by mouth daily   Fluticasone-Salmeterol (Wixela Inhub) 250-50 mcg/dose inhaler  Self No No   Sig: Inhale 1 puff 2 (two) times a day Rinse mouth after use. Insulin Pen Needle (BD Pen Needle Pilar 2nd Gen) 32G X 4 MM MISC  Self No No   Sig: For use with insulin pen. Pharmacy may dispense brand covered by insurance.    Insulin Pen Needle (NovoFine Autocover) 30G X 8 MM MISC  Self No No   Sig: Inject under the skin daily   Insulin Pen Needle 30G X 8 MM MISC  Self Yes No   Si (two) times a day   Insulin Pen Needle 31G X 8 MM MISC  Self No No   Sig: Use daily Inject under the skin   Lancets (onetouch ultrasoft) lancets  Self No No   Sig: Use once daily   NovoFine Autocover Pen Needle 30G X 8 MM MISC  Self No No   Sig: USE TWICE A DAY   Toujeo SoloStar 300 units/mL CONCENTRATED U-300 injection pen (1-unit dial)  Self No No   Sig: inject 25 units subcutaneously daily at bedtime   albuterol (PROVENTIL HFA,VENTOLIN HFA) 90 mcg/act inhaler  Self Yes No   Sig: Inhale 2 puffs every 6 (six) hours as needed for wheezing   ammonium lactate (LAC-HYDRIN) 12 % lotion  Self Yes No Sig: APPLY TOPICALLY TO AFFECTED AREAS twice a day   apixaban (ELIQUIS) 5 mg  Self No No   Sig: Take 1 tablet (5 mg total) by mouth 2 (two) times a day   doxycycline (ADOXA) 100 MG tablet   No No   Sig: Take 1 tablet (100 mg total) by mouth 2 (two) times a day for 9 days   ferrous sulfate 325 (65 Fe) mg tablet   No No   Sig: Take 1 tablet (325 mg total) by mouth daily with breakfast Do not start before 2023. fluticasone (FLONASE) 50 mcg/act nasal spray  Self No No   Si spray into each nostril daily   glucose blood (OneTouch Verio) test strip  Self No No   Sig: Use 1 each 4 (four) times a day Use as instructed   insulin lispro (HumaLOG KwikPen) 100 units/mL injection pen  Self No No   Sig: Use 10 units prior to each meal +1 unit per 50 above 150 mg/dL   metoprolol tartrate (LOPRESSOR) 50 mg tablet   No No   Sig: Take 1 tablet (50 mg total) by mouth every 12 (twelve) hours   montelukast (SINGULAIR) 10 mg tablet  Self No No   Sig: take 1 tablet by mouth at bedtime   nystatin (MYCOSTATIN) powder  Self No No   Sig: Apply topically 2 (two) times a day   pantoprazole (PROTONIX) 40 mg tablet  Self No No   Sig: Take 1 tablet (40 mg total) by mouth daily   polyethylene glycol (MIRALAX) 17 g packet   No No   Sig: Take 17 g by mouth daily Do not start before 2023. rosuvastatin (CRESTOR) 10 MG tablet  Self No No   Sig: take 1 tablet by mouth once daily   senna (SENOKOT) 8.6 mg   No No   Sig: Take 2 tablets (17.2 mg total) by mouth daily at bedtime   sodium chloride, PF, 0.9 %   No No   Sig: 10 mL by Intracatheter route daily Intracatheter flushing daily. May substitute prefilled syringe with normal saline 10 mL vials, 10 mL syringes, and 18 g blunt needles   torsemide (DEMADEX) 10 mg tablet   No No   Sig: Take 1 tablet (10 mg total) by mouth daily Do not start before 2023.       Facility-Administered Medications: None       Past Medical History:   Diagnosis Date   • SEBASTIAN (acute kidney injury) ([address]) 01/23/2023   • Anemia    • Asthma    • Atrial fibrillation [facility])    • Chronic kidney disease    • COVID-19 January 2022   • Diabetes mellitus ([address])    • GERD (gastroesophageal reflux disease)    • Hyperlipidemia    • Hypertension    • PONV (postoperative nausea and vomiting)    • Sleep apnea     wear BIPAP   • SVT (supraventricular tachycardia) ([address])        Past Surgical History:   Procedure Laterality Date   • APPENDECTOMY     • BREAST BIOPSY Right     years ago when she was 23   • BREAST BIOPSY Right 03/13/2023   • BREAST LUMPECTOMY Right 5/30/2023    Procedure: RIGHT BREAST KRIS  DIRECTED LUMPECTOMY - Marvin Thomson;  Surgeon: Guy Pham MD;  Location: [facility];  Service: Surgical Oncology   • CYSTOSCOPY W/ LASER LITHOTRIPSY Left 07/12/2016    Procedure: CYSTOSCOPY URETEROSCOPY WITH LITHOTRIPSY HOLMIUM LASER, RETROGRADE PYELOGRAM AND INSERTION STENT URETERAL;  Surgeon: Zuleyma James MD;  Location: [facility];  Service:    • DILATION AND CURETTAGE OF UTERUS     • IR DRAINAGE TUBE CHECK/CHANGE/REPOSITION/REINSERTION/UPSIZE  8/18/2023   • IR DRAINAGE TUBE CHECK/CHANGE/REPOSITION/REINSERTION/UPSIZE  8/28/2023   • IR DRAINAGE TUBE PLACEMENT  8/18/2023   • IR THORACENTESIS  03/18/2022   • JOINT REPLACEMENT      right knee   • KNEE ARTHROPLASTY Right    • MAMMO NEEDLE LOCALIZATION LEFT (ALL INC) EACH ADD Right 4/24/2023   • MAMMO STEREOTACTIC BREAST BIOPSY RIGHT (ALL INC) Right 03/13/2023    High Risk Lesion   • ME ARTHROPLASTY GLENOHUMERAL JOINT TOTAL SHOULDER Left 03/01/2022    Procedure: ARTHROPLASTY SHOULDER REVERSE;  Surgeon: Frantz Lezama MD;  Location: [facility];  Service: Orthopedics   • ME CYSTO BLADDER W/URETERAL CATHETERIZATION Left 06/29/2016    Procedure: CYSTOSCOPY RETROGRADE PYELOGRAM WITH INSERTION STENT URETERAL, left;  Surgeon: Zuleyma James MD;  Location: [facility];  Service: Urology   • SHOULDER ARTHROTOMY Left        Family History Problem Relation Age of Onset   • Heart disease Mother    • Diabetes Father    • Thyroid cancer Sister    • Heart disease Brother    • Diabetes Brother    • Heart disease Brother    • Heart disease Brother    • Emphysema Maternal Grandmother    • Heart disease Family      I have reviewed and agree with the history as documented. E-Cigarette/Vaping   • E-Cigarette Use Never User      E-Cigarette/Vaping Substances   • Nicotine No    • THC No    • CBD No    • Flavoring No    • Other No    • Unknown No      Social History     Tobacco Use   • Smoking status: Former     Packs/day: 1.00     Years: 20.00     Total pack years: 20.00     Types: Cigarettes     Quit date: 1996     Years since quittin.1     Passive exposure: Past   • Smokeless tobacco: Never   Vaping Use   • Vaping Use: Never used   Substance Use Topics   • Alcohol use: Not Currently     Comment: rarely   • Drug use: Never        Review of Systems   Unable to perform ROS: Severe respiratory distress   All other systems reviewed and are negative. Physical Exam  ED Triage Vitals   Temperature Pulse Respirations Blood Pressure SpO2   23 1506 23 1506 23 1506 23 1508 23 1506   98.2 °F (36.8 °C) 65 20 130/63 96 %      Temp Source Heart Rate Source Patient Position - Orthostatic VS BP Location FiO2 (%)   23 1506 23 1506 -- -- --   Oral Monitor         Pain Score       --                    Orthostatic Vital Signs  Vitals:    23 2056 23 2101 23 2200 23 2300   BP: 91/51 (!) 87/51 90/57 (!) 83/57   Pulse: 81 81 83 82       Physical Exam  Vitals and nursing note reviewed. Constitutional:       General: She is in acute distress. Appearance: She is obese. She is ill-appearing, toxic-appearing and diaphoretic. HENT:      Head: Normocephalic and atraumatic.       Right Ear: External ear normal.      Left Ear: External ear normal.      Nose: Nose normal.      Mouth/Throat:      Mouth: Mucous membranes are moist.   Eyes:      General: No scleral icterus. Right eye: No discharge. Left eye: No discharge. Extraocular Movements: Extraocular movements intact. Conjunctiva/sclera: Conjunctivae normal.   Cardiovascular:      Rate and Rhythm: Tachycardia present. Rhythm irregular. Pulses: Normal pulses. Heart sounds: Normal heart sounds. Pulmonary:      Effort: Respiratory distress present. Comments: Diminished lung sounds on the left. Tachypnea, increased work of breathing, tripod position. Abdominal:      General: There is no distension. Palpations: Abdomen is soft. There is no mass. Tenderness: There is no abdominal tenderness. There is no guarding or rebound. Hernia: No hernia is present. Musculoskeletal:         General: No swelling, tenderness, deformity or signs of injury. Cervical back: Neck supple. No tenderness. Right lower leg: Edema present. Left lower leg: Edema present. Skin:     General: Skin is cool. Capillary Refill: Capillary refill takes more than 3 seconds. Coloration: Skin is pale. Comments: Old lesion on right breast, nondraining, no surrounding erythema, granulation tissue. Bilateral intertriginous inframamillary rash with stellate lesions. Inguinal rash and erythema w/ overlying candidal rash. Neurological:      Mental Status: She is alert.          ED Medications  Medications   sodium chloride 0.9 % bolus 1,000 mL (1,000 mL Intravenous Not Given 8/28/23 1916)   furosemide (LASIX) 120 mg in dextrose 5 % 50 mL IVPB (120 mg Intravenous Not Given 8/28/23 1922)   propofol (DIPRIVAN) 1000 mg in 100 mL infusion (premix) (10 mcg/kg/min × 120 kg Intravenous New Bag 8/28/23 1947)   amiodarone (CORDARONE) 900 mg in dextrose 5 % 500 mL infusion (1 mg/min Intravenous New Bag 8/28/23 2010)     Followed by   amiodarone (CORDARONE) 900 mg in dextrose 5 % 500 mL infusion (has no administration in time range)   NxStage K 0/Ca 3 dialysis solution (RFP-402) 20,000 mL (20,000 mL Dialysis New Bag 8/28/23 2240)   bisacodyl (DULCOLAX) rectal suppository 10 mg (has no administration in time range)   NOREPINEPHRINE 4 MG  ML NSS (CMPD ORDER) infusion (30 mcg/min Intravenous Rate/Dose Change 8/28/23 2306)   vasopressin (PITRESSIN) 20 Units in sodium chloride 0.9 % 100 mL infusion (0.04 Units/min Intravenous New Bag 8/28/23 2218)   pantoprazole (PROTONIX) EC tablet 40 mg (has no administration in time range)   chlorhexidine (PERIDEX) 0.12 % oral rinse 15 mL (15 mL Mouth/Throat Given 8/28/23 2311)   insulin lispro (HumaLOG) 100 units/mL subcutaneous injection 2-12 Units (has no administration in time range)   albuterol (PROVENTIL HFA,VENTOLIN HFA) inhaler 2 puff (has no administration in time range)   apixaban (ELIQUIS) tablet 5 mg (5 mg Oral Given 8/28/23 2311)   fluticasone (FLONASE) 50 mcg/act nasal spray 1 spray (has no administration in time range)   montelukast (SINGULAIR) tablet 10 mg (10 mg Oral Given 8/28/23 2311)   nystatin (MYCOSTATIN) powder (has no administration in time range)   polyethylene glycol (MIRALAX) packet 17 g (has no administration in time range)   atorvastatin (LIPITOR) tablet 40 mg (has no administration in time range)   senna (SENOKOT) tablet 17.2 mg (17.2 mg Oral Given 8/28/23 2311)   vancomycin (VANCOCIN) 2,000 mg in sodium chloride 0.9 % 500 mL IVPB (has no administration in time range)   cefepime (MAXIPIME) 2 g/50 mL dextrose IVPB (has no administration in time range)   EPINEPHrine 5,000 mcg (STANDARD CONCENTRATION) IV in sodium chloride 0.9% 250 mL (has no administration in time range)   ondansetron (ZOFRAN) injection 4 mg (4 mg Intravenous Given 8/28/23 1623)   metoclopramide (REGLAN) injection 10 mg (10 mg Intravenous Given 8/28/23 1639)   lidocaine-epinephrine (XYLOCAINE/EPINEPHRINE) 1 %-1:100,000 injection 20 mL (20 mL Infiltration Given 8/28/23 1718)   albuterol inhalation solution 10 mg (10 mg Nebulization Given 8/28/23 1744)     And   sodium chloride 0.9 % inhalation solution 3 mL (3 mL Nebulization Given 8/28/23 1744)   calcium gluconate 1 g in sodium chloride 0.9% 50 mL (premix) (0 g Intravenous Stopped 8/28/23 1830)   dextrose 50 % IV solution 25 mL (25 mL Intravenous Given 8/28/23 1800)   etomidate (AMIDATE) 2 mg/mL injection 30 mg (30 mg Intravenous Given 8/28/23 1835)   ROCuronium (ZEMURON) injection 150 mg (150 mg Intravenous Given 8/28/23 1836)   cefepime (MAXIPIME) 2 g/50 mL dextrose IVPB (0 mg Intravenous Stopped 8/28/23 1730)   insulin regular (HumuLIN R,NovoLIN R) injection 10 Units (10 Units Intravenous Given 8/28/23 1800)   lidocaine (PF) (XYLOCAINE-MPF) 1 % injection 10 mL (10 mL Infiltration Given 8/28/23 1745)   hydrocortisone (Solu-CORTEF) injection 200 mg (200 mg Intravenous Given 8/28/23 1910)   lactated ringers bolus 1,000 mL (0 mL Intravenous Stopped 8/28/23 1921)   amiodarone 150 mg in dextrose 5 % 100 mL IV bolus (150 mg Intravenous New Bag 8/28/23 1929)   iohexol (OMNIPAQUE) 350 MG/ML injection (SINGLE-DOSE) 100 mL (100 mL Intravenous Given 8/28/23 2146)   sodium bicarbonate 8.4 % injection 50 mEq (50 mEq Intravenous Given 8/28/23 2239)   sodium bicarbonate 8.4 % injection 50 mEq (50 mEq Intravenous Given 8/28/23 2312)       Diagnostic Studies  Results Reviewed     Procedure Component Value Units Date/Time    Body fluid white cell count with differential [[id number]] Collected: 08/28/23 2228    Lab Status: Final result Specimen: Body Fluid from Thoracentesis Updated: 08/29/23 0000     Site Thoracentesis     Color, Fluid Yellow     Clarity, Fluid Clear     WBC, Fluid 229 /ul     LD (LDH), Body Fluid [[id number]] Collected: 08/28/23 2228    Lab Status: In process Specimen: Body Fluid from Other Updated: 08/28/23 2237    Body fluid culture and Gram stain [[id number]] Collected: 08/28/23 2229    Lab Status: In process Specimen:  Body Fluid from Thoracentesis Updated: 08/28/23 2237    Blood gas, venous [[id number]]  (Abnormal) Collected: 08/28/23 2219    Lab Status: Final result Specimen: Blood from Central Venous Line Updated: 08/28/23 2230     pH, Pillo 6.972     pCO2, Pillo 51.4 mm Hg      pO2, Pillo 43.7 mm Hg      HCO3, Pillo 11.6 mmol/L      Base Excess, Pillo -19.4 mmol/L      O2 Content, Pillo 7.6 ml/dL      O2 HGB, VENOUS 54.8 %     Blood culture #1 [[id number]] Collected: 08/28/23 1613    Lab Status: Preliminary result Specimen: Blood from Arm, Left Updated: 08/28/23 1901     Blood Culture Received in Microbiology Lab. Culture in Progress. Blood culture #2 [[id number]] Collected: 08/28/23 1618    Lab Status: Preliminary result Specimen: Blood Updated: 08/28/23 1901     Blood Culture Received in Microbiology Lab. Culture in Progress. HS Troponin I 4hr [[id number]]     Lab Status: No result Specimen: Blood     Lactic acid 2 Hours [[id number]]  (Abnormal) Collected: 08/28/23 1749    Lab Status: Final result Specimen: Blood from Arm, Right Updated: 08/28/23 1811     LACTIC ACID 6.1 mmol/L     Narrative:      Result may be elevated if tourniquet was used during collection.     Protime-INR [[id number]]  (Abnormal) Collected: 08/28/23 1749    Lab Status: Final result Specimen: Blood from Arm, Right Updated: 08/28/23 1811     Protime 35.1 seconds      INR 3.37    APTT [[id number]]  (Abnormal) Collected: 08/28/23 1749    Lab Status: Final result Specimen: Blood from Arm, Right Updated: 08/28/23 1811     PTT 46 seconds     Blood gas, venous [[id number]]  (Abnormal) Collected: 08/28/23 1749    Lab Status: Final result Specimen: Blood from Arm, Right Updated: 08/28/23 1756     pH, Pillo 7.150     pCO2, Pillo 41.9 mm Hg      pO2, Pillo 80.4 mm Hg      HCO3, Pillo 14.3 mmol/L      Base Excess, Pillo -13.8 mmol/L      O2 Content, Pillo 13.1 ml/dL      O2 HGB, VENOUS 87.8 %     B-Type Natriuretic Peptide(BNP) [[id number]]  (Abnormal) Collected: 08/28/23 4804    Lab Status: Final result Specimen: Blood from Arm, Left Updated: 08/28/23 1724      pg/mL     Lactic acid, plasma (w/reflex if result > 2.0) [[id number]]  (Abnormal) Collected: 08/28/23 1613    Lab Status: Final result Specimen: Blood from Arm, Left Updated: 08/28/23 1718     LACTIC ACID 4.0 mmol/L     Narrative:      Result may be elevated if tourniquet was used during collection.     Comprehensive metabolic panel [[id number]]  (Abnormal) Collected: 08/28/23 1613    Lab Status: Final result Specimen: Blood from Hand, Left Updated: 08/28/23 1718     Sodium 135 mmol/L      Potassium 7.4 mmol/L      Chloride 98 mmol/L      CO2 21 mmol/L      ANION GAP 16 mmol/L      BUN 91 mg/dL      Creatinine 7.38 mg/dL      Glucose 109 mg/dL      Calcium 9.0 mg/dL      AST 75 U/L      ALT 64 U/L      Alkaline Phosphatase 142 U/L      Total Protein 7.5 g/dL      Albumin 3.6 g/dL      Total Bilirubin 0.60 mg/dL      eGFR 5 ml/min/1.73sq m     Narrative:      National Kid[employer] Disease Foundation guidelines for Chronic Kidney Disease (CKD):   •  Stage 1 with normal or high GFR (GFR > 90 mL/min/1.73 square meters)  •  Stage 2 Mild CKD (GFR = 60-89 mL/min/1.73 square meters)  •  Stage 3A Moderate CKD (GFR = 45-59 mL/min/1.73 square meters)  •  Stage 3B Moderate CKD (GFR = 30-44 mL/min/1.73 square meters)  •  Stage 4 Severe CKD (GFR = 15-29 mL/min/1.73 square meters)  •  Stage 5 End Stage CKD (GFR <15 mL/min/1.73 square meters)  Note: GFR calculation is accurate only with a steady state creatinine    Lipase [[id number]]  (Normal) Collected: 08/28/23 1613    Lab Status: Final result Specimen: Blood from Hand, Left Updated: 08/28/23 1654     Lipase 22 u/L     HS Troponin 0hr (reflex protocol) [[id number]]  (Normal) Collected: 08/28/23 1613    Lab Status: Final result Specimen: Blood from Arm, Left Updated: 08/28/23 1644     hs TnI 0hr 12 ng/L     HS Troponin I 2hr [[id number]]     Lab Status: No result Specimen: Blood     CBC and differential [[id number]]  (Abnormal) Collected: 08/28/23 4137 Lab Status: Final result Specimen: Blood from Arm, Left Updated: 08/28/23 1626     WBC 17.85 Thousand/uL      RBC 3.30 Million/uL      Hemoglobin 9.5 g/dL      Hematocrit 31.2 %      MCV 95 fL      MCH 28.8 pg      MCHC 30.4 g/dL      RDW 16.9 %      MPV 10.7 fL      Platelets 707 Thousands/uL      nRBC 0 /100 WBCs      Neutrophils Relative 80 %      Immat GRANS % 2 %      Lymphocytes Relative 11 %      Monocytes Relative 7 %      Eosinophils Relative 0 %      Basophils Relative 0 %      Neutrophils Absolute 14.28 Thousands/µL      Immature Grans Absolute 0.32 Thousand/uL      Lymphocytes Absolute 1.98 Thousands/µL      Monocytes Absolute 1.20 Thousand/µL      Eosinophils Absolute 0.00 Thousand/µL      Basophils Absolute 0.07 Thousands/µL     UA w Reflex to Microscopic w Reflex to Culture [[id number]]     Lab Status: No result Specimen: Urine     Fingerstick Glucose (POCT) [[id number]]  (Normal) Collected: 08/28/23 1514    Lab Status: Final result Updated: 08/28/23 1517     POC Glucose 135 mg/dl                  XR chest 1 view portable   Final Result by Prince Alisha MD (08/28 1629)      Increase in left effusion, moderate, with moderate left base atelectasis. Pneumonia not excluded in the appropriate clinical setting. Persistent trace right effusion.                Workstation performed: [id number]         XR chest 1 view portable    (Results Pending)   XR chest 1 view portable    (Results Pending)   CTA chest (pe study) abdomen pelvis contrast    (Results Pending)         Procedures  Central Line    Date/Time: 8/28/2023 8:43 PM    Performed by: Elan Braswell MD  Authorized by: Elan Braswell MD    Patient location:  ED  Other Assisting Provider: Yes (comment)    Consent:     Consent obtained:  Written and verbal    Consent given by:  Patient    Risks discussed:  Arterial puncture, incorrect placement, nerve damage, bleeding, infection and pneumothorax    Alternatives discussed:  No treatment  Universal protocol:     Procedure explained and questions answered to patient or proxy's satisfaction: yes      Relevant documents present and verified: yes      Test results available and properly labeled: yes      Radiology Images displayed and confirmed. If images not available, report reviewed: yes      Required blood products, implants, devices, and special equipment available: yes      Site/side marked: no      Immediately prior to procedure, a time out was called: no      Patient identity confirmed:  Verbally with patient and hospital-assigned identification number  Pre-procedure details:     Hand hygiene: Hand hygiene performed prior to insertion      Sterile barrier technique: All elements of maximal sterile technique followed      Skin preparation:  ChloraPrep    Skin preparation agent: Skin preparation agent completely dried prior to procedure    Indications:     Central line indications: medications requiring central line, hemodynamic monitoring and no peripheral vascular access      Site selection rationale:  Right IJ due to decreased risk of infection  Procedure details:     Location:  Right internal jugular    Vessel type: vein      Laterality:  Right    Approach: percutaneous technique used      Patient position:  Flat    Catheter type:  Triple lumen 16cm    Landmarks identified: yes      Ultrasound guidance: yes      Ultrasound image availability:  Not obtained due to urgency    Sterile ultrasound techniques: Sterile gel and sterile probe covers were used      Manometry confirmation: no      Number of attempts:  1    Successful placement: yes      Vessel of catheter tip end:  SVC  Post-procedure details:     Post-procedure:  Line sutured and dressing applied    Assessment:  Blood return through all ports, no pneumothorax on x-ray, free fluid flow and placement verified by x-ray    Post-procedure complications: none      Patient tolerance of procedure:   Tolerated well, no immediate complications    Observer: Yes      Observer name:  Husam  Intubation    Date/Time: 8/28/2023 8:47 PM    Performed by: Starla Senior MD  Authorized by: Starla Senior MD    Patient location:  ED  Other Assisting Provider: Yes (comment)    Consent:     Consent obtained:  Verbal and emergent situation    Consent given by:  Patient    Risks discussed:  Death and hypoxia    Alternatives discussed:  No treatment  Universal protocol:     Patient identity confirmed: Anonymous protocol, patient vented/unresponsive  Pre-procedure details:     Patient status:  Altered mental status    Mallampati score:  3    Pretreatment medications:  Etomidate    Paralytics:  Rocuronium  Indications:     Indications for intubation: respiratory failure, airway protection and hypoxemia    Procedure details:     Preoxygenation:  BiPAP    CPR in progress: no      Intubation method:  Oral    Oral intubation technique:  Direct and glidescope    Laryngoscope blade: Mac 3    Tube size (mm):  7.5    Tube type:  Cuffed    Number of attempts:  1    Ventilation between attempts: no      Cricoid pressure: no      Tube visualized through cords: yes    Placement assessment:     ETT to lip:  22cm    Tube secured with:  ETT [place]    Breath sounds:  Equal and absent over the epigastrium    Placement verification: chest rise, condensation, colorimetric ETCO2 device, CXR verification, direct visualization, equal breath sounds and tube exhalation      CXR findings:  ETT in proper place    Ventilator settings:  [address] 14/340/100/6  Post-procedure details:     Patient tolerance of procedure:   Tolerated well, no immediate complications  Central Line    Date/Time: 8/28/2023 8:50 PM    Performed by: Starla Senior MD  Authorized by: Starla Senior MD    Patient location:  ED  Other Assisting Provider: Yes (comment)    Consent:     Consent obtained:  Emergent situation    Consent given by:  Healthcare agent    Alternatives discussed:  No treatment  Universal protocol:     Patient identity confirmed: Anonymous protocol, patient vented/unresponsive  Pre-procedure details:     Hand hygiene: Hand hygiene performed prior to insertion      Sterile barrier technique: All elements of maximal sterile technique followed      Skin preparation:  ChloraPrep    Skin preparation agent: Skin preparation agent completely dried prior to procedure    Indications:     Central line indications: medications requiring central line, hemodynamic monitoring and dialysis      Site selection rationale:  Right IJ taken w/ cvc and patient requiring HD  Anesthesia (see MAR for exact dosages): Anesthesia method:  None  Procedure details:     Location:  Right femoral    Vessel type: vein      Laterality:  Right    Approach: percutaneous technique used      Patient position:  Flat    Catheter type:  Triple lumen    Landmarks identified: yes      Ultrasound guidance: yes      Ultrasound image availability:  Not obtained due to urgency    Sterile ultrasound techniques: Sterile gel and sterile probe covers were used      Manometry confirmation: no      Number of attempts:  1    Successful placement: yes    Post-procedure details:     Post-procedure:  Dressing applied and line sutured    Assessment:  Blood return through all ports and free fluid flow    Post-procedure complications: none      Patient tolerance of procedure: Tolerated well, no immediate complications    Observer: Yes            ED Course  ED Course as of 08/29/23 0004   Mon Aug 28, 2023   1641 Respiratory advised patient continuing to have nausea despite Reglan and Zofran. Patient taken off BiPAP and placed on mid flow. Consent for thoracentesis signed. 1717 WBC(!): 17.85   1717 Neutrophils %(!): 80   1717 Absolute Neutrophils(!): 14.28   1717 Immature Grans Absolute(!): 0.32   1819 IV line infiltrated, CVC placed in Right IJ.    1928 Patient intubated. Started on stress dose steroids and vasopressin.  Will need HD per nephrology. Will place HD cath. 2107 EKG atrial fibrillation with RVR, rate of 119, normal axis, no ST elevations, no ST depressions, QTc 483, when compared to previous EKG on August 17, 2023 continued A-fib with RVR. 2348 Concern for cardiac tamponade. Spoke w/ radiology regarding CT imaging study and advised moderate pericardial effusion w/ reflux of contrast into the hepatic vessels. Increased right heart pressure w/o tamponade physiology or signs of right heart strain. Scattered PNA. Likely 2/2 fluid overload and renal failure. Agrees [facility] interpretation of CT read. Medical Decision Making  71 yo f presenting in resp distress. History of asthma, atrial fibrillation, diabetes mellitus, HLD, HTN, SVT presenting from nursing home with shortness of breath and speaking 1-2 word sentences. Differential includes but not limited to pneumonia, ACS, pneumothorax, liver failure, renal failure, sepsis, CHF exacerbation, UTI. Decreased lung sounds on the left side with concern for pneumothorax, consolidation, pleural effusion, mucus plugging, foreign body. Noted pleural effusion on cxr. Started on BiPAP and thoracentesis performed with removal of 760 ml. Patient had continued respiratory distress, nausea, low blood pressure with infiltration of PIV line. Patient prepared for intubation and started on midflow for concern of aspiration while on BiPAP. Central line placed, patient intubated with 7.5 tube at 22 mm at the corner of the lip. Nephrology consulted for acute renal failure with hyperkalemia, advised would likely need hemodialysis. Patient started on calcium chloride, insulin, and albuterol for hyperkalemia. No UOP despite 3L bolus. Elevated WBC likely secondary to pneumonia but pending urine and further imaging. Patient given cefepime. Continued hypotension despite 3L bolus, started on Levophed, vasopressin, stress dose steroids.    Heart rate escalated during ED and in 140s to 180s, started amnio drip and infusion. HD cath placed in the right femoral vein for pending CVVHD. CT PE study and chest/abd/pelv order, discussed w/ radiology. Discussed case w/ ICU and patient admitted for further management of respiratory failure, renal failure, hyperkalemia. On chart review, noted moderate pericardial effusion, discussed w/ ICU and radiology. ICU performed bedside ECHO prior and concern for pericardial effusion. Radiology advised, Moderate pericardial effusion w/ reflux of contrast into the hepatic vessels. Increased right heart pressure w/o tamponade physiology or signs of right heart strain. Scattered PNA. Likely 2/2 fluid overload and renal failure. Radiology agreed w/ [facility] interpretation of CT scan. Amount and/or Complexity of Data Reviewed  Labs: ordered. Decision-making details documented in ED Course. Radiology: ordered. Risk  Prescription drug management.             Disposition  Final diagnoses:   Acute renal failure, unspecified acute renal failure type (HCC)   Hyperkalemia   Acute respiratory failure, unspecified whether with hypoxia or hypercapnia (HCC)   Pleural effusion     Time reflects when diagnosis was documented in both MDM as applicable and the Disposition within this note     Time User Action Codes Description Comment    8/28/2023  6:58 PM Keegan Haney Add [N17.9] Acute renal failure, unspecified acute renal failure type ([address])     8/28/2023 10:30 PM Berenice Poole Add [J18.9,  A41.9] Sepsis due to pneumonia ([address])     8/28/2023 11:37 PM Lilliana Haney Add [E87.5] Hyperkalemia     8/28/2023 11:37 PM Lilliana Haney Add [J96.00] Acute respiratory failure, unspecified whether with hypoxia or hypercapnia ([address])     8/28/2023 11:37 PM Keegan Haney Add [J90] Pleural effusion       ED Disposition     None      Follow-up Information    None         Current Discharge Medication List      CONTINUE these medications which have NOT CHANGED Details   albuterol (PROVENTIL HFA,VENTOLIN HFA) 90 mcg/act inhaler Inhale 2 puffs every 6 (six) hours as needed for wheezing      ammonium lactate (LAC-HYDRIN) 12 % lotion APPLY TOPICALLY TO AFFECTED AREAS twice a day      apixaban (ELIQUIS) 5 mg Take 1 tablet (5 mg total) by mouth 2 (two) times a day  Qty: 60 tablet, Refills: 2    Associated Diagnoses: Paroxysmal atrial fibrillation (HCC)      B Complex-C-Folic Acid (triphrocaps) 1 MG CAPS Take 1 capsule (1 mg total) by mouth daily  Qty: 30 capsule, Refills: 5    Associated Diagnoses: Anemia, unspecified type      doxycycline (ADOXA) 100 MG tablet Take 1 tablet (100 mg total) by mouth 2 (two) times a day for 9 days  Qty: 18 tablet, Refills: 0    Associated Diagnoses: Breast abscess of female      ferrous sulfate 325 (65 Fe) mg tablet Take 1 tablet (325 mg total) by mouth daily with breakfast Do not start before August 23, 2023. Qty: 30 tablet, Refills: 0    Associated Diagnoses: Anemia, unspecified type      fluticasone (FLONASE) 50 mcg/act nasal spray 1 spray into each nostril daily  Refills: 0    Associated Diagnoses: COVID-19      Fluticasone-Salmeterol (Wixela Inhub) 250-50 mcg/dose inhaler Inhale 1 puff 2 (two) times a day Rinse mouth after use. Qty: 60 blister, Refills: 3    Comments: Substitution to a formulary equivalent within the same pharmaceutical class is authorized. Associated Diagnoses: Moderate persistent asthma with acute exacerbation      glucose blood (OneTouch Verio) test strip Use 1 each 4 (four) times a day Use as instructed  Qty: 400 strip, Refills: 1    Associated Diagnoses: Type 2 diabetes mellitus with hyperosmolarity without coma, with long-term current use of insulin ([address]);  Uncontrolled type 2 diabetes mellitus with hyperglycemia (HCC)      insulin lispro (HumaLOG KwikPen) 100 units/mL injection pen Use 10 units prior to each meal +1 unit per 50 above 150 mg/dL  Qty: 15 mL, Refills: 2    Associated Diagnoses: Type 2 diabetes mellitus with hyperosmolarity without coma, without long-term current use of insulin ([address])      ! ! Insulin Pen Needle (BD Pen Needle Pilar 2nd Gen) 32G X 4 MM MISC For use with insulin pen. Pharmacy may dispense brand covered by insurance. Qty: 100 each, Refills: 0    Associated Diagnoses: Type 2 diabetes mellitus with hyperosmolarity without coma, without long-term current use of insulin ([address])      ! ! Insulin Pen Needle (NovoFine Autocover) 30G X 8 MM MISC Inject under the skin daily  Qty: 100 each, Refills: 3    Associated Diagnoses: Type 2 diabetes mellitus with hyperosmolarity without coma, without long-term current use of insulin ([address])      ! ! Insulin Pen Needle 30G X 8 MM MISC 2 (two) times a day      Lancets (onetouch ultrasoft) lancets Use once daily  Qty: 100 each, Refills: 2    Comments: New FAX number [phone]  Associated Diagnoses: Type 2 diabetes mellitus with hyperosmolarity without coma, without long-term current use of insulin (HCC)      metoprolol tartrate (LOPRESSOR) 50 mg tablet Take 1 tablet (50 mg total) by mouth every 12 (twelve) hours  Qty: 60 tablet, Refills: 0    Associated Diagnoses: Essential hypertension      montelukast (SINGULAIR) 10 mg tablet take 1 tablet by mouth at bedtime  Qty: 30 tablet, Refills: 1    Associated Diagnoses: Moderate persistent asthma with acute exacerbation      !!  NovoFine Autocover Pen Needle 30G X 8 MM MISC USE TWICE A DAY  Qty: 300 each, Refills: 5    Associated Diagnoses: Type 2 diabetes mellitus with hyperglycemia, without long-term current use of insulin (HCC)      nystatin (MYCOSTATIN) powder Apply topically 2 (two) times a day  Qty: 15 g, Refills: 0    Associated Diagnoses: Rash      pantoprazole (PROTONIX) 40 mg tablet Take 1 tablet (40 mg total) by mouth daily  Qty: 30 tablet, Refills: 0    Associated Diagnoses: Acute gout due to renal impairment involving toe of left foot      polyethylene glycol (MIRALAX) 17 g packet Take 17 g by mouth daily Do not start before August 23, 2023. Qty: 510 g, Refills: 0    Associated Diagnoses: Chronic idiopathic constipation      rosuvastatin (CRESTOR) 10 MG tablet take 1 tablet by mouth once daily  Qty: 90 tablet, Refills: 1    Associated Diagnoses: Type 2 diabetes mellitus with hyperosmolarity without coma, without long-term current use of insulin ([address]); Mixed hyperlipidemia      senna (SENOKOT) 8.6 mg Take 2 tablets (17.2 mg total) by mouth daily at bedtime  Qty: 60 tablet, Refills: 0    Associated Diagnoses: Chronic idiopathic constipation      sodium chloride, PF, 0.9 % 10 mL by Intracatheter route daily Intracatheter flushing daily. May substitute prefilled syringe with normal saline 10 mL vials, 10 mL syringes, and 18 g blunt needles  Qty: 300 mL, Refills: 2    Associated Diagnoses: Sepsis ([address])      torsemide (DEMADEX) 10 mg tablet Take 1 tablet (10 mg total) by mouth daily Do not start before August 23, 2023. Qty: 30 tablet, Refills: 0    Associated Diagnoses: Lower leg edema      Toujeo SoloStar 300 units/mL CONCENTRATED U-300 injection pen (1-unit dial) inject 25 units subcutaneously daily at bedtime  Qty: 4.5 mL, Refills: 2    Associated Diagnoses: Type 2 diabetes mellitus with hyperglycemia, without long-term current use of insulin ([address])       ! ! - Potential duplicate medications found. Please discuss with provider. No discharge procedures on file. PDMP Review       Value Time User    PDMP Reviewed  Yes 4/6/2023 11:35 AM Shawna Vicente MD           ED Provider  Attending physically available and evaluated Dane Estrada. I managed the patient along with the ED Attending.     Electronically Signed by         Nathan Cruz MD  08/29/23 [id number]

## 2023-08-28 NOTE — CONSULTS
Consultation - Nephrology   Herb Murphy 72 y.o. female MRN: [id number]  Unit/Bed#: ED-19 Encounter: [id number]    ASSESSMENT AND PLAN:  Patient is 72-year-old female with significant medical issues of CKD, diastolic CHF, diabetes, GERD, hyperlipidemia, A-fib, presented from nursing home due to worsening respiratory distress. We are consulted for SEBASTIAN/CKD management. Severe SEBASTIAN POA on CKD stage III, baseline creatinine 1.5-1.7 since mid 2022, follows with Dr. Lorie Johansen  -She was recently hospitalized when had episode of SEBASTIAN with peak creatinine 2.9 improved and creatinine on discharge 2.0  -Now presented with creatinine 7.3 on admission  -SEBASTIAN suspect secondary to severe hypotension/shock, use of Bactrim (started on 8/27/2023, based on nursing home records), torsemide all causing ATN  -Poor urine output so far, status post total 2 l of IV fluid bolus in ER and currently getting third liter bolus  -Now has Gomez catheter placed  -I had detailed discussion with patient's cousin sister Bill Cronin [facility]) over the phone regarding significant worsening renal failure, severe life-threatening hyperkalemia and likely need for dialysis. She has provided consent for dialysis which is in the chart. -UA during recent hospitalization this month shows no proteinuria or significant hematuria. UPC ratio 1 g  Addendum: Discussed with Dr. Seth Regalado from [facility] who confirmed that patient did not get any Bactrim. Lactic acidosis/metabolic acidosis/respiratory acidosis  -pH noted 7.15  -Patient now just intubated. Repeat ABG.   -Vent settings as per ICU team.  -Consider changing to IV bicarb drip  -Bicarb Level 21  -Plan to initiate dialysis if no significant urine output  -Lactic acid level 6.1, continue to trend    Severe life-threatening hyperkalemia  -Serum potassium 7.4  -Suspect secondary to recently started Bactrim yesterday, severe SEBASTIAN  -Agree with initial medical management including albuterol inhaler, IV calcium gluconate, IV insulin  -Status post IV fluid boluses as above. Plan to give IV Lasix if BP tolerates, currently significantly hypotensive in shock requiring vasopressors.  -So far poor urine output, will need to pre HD catheter placement with initiation of CRRT if no improvement in urine output    Severe hypotension, shock, rule out infection issues  -Patient was started on Bactrim yesterday based on nursing records. Avoid further Bactrim. -Empiric antibiotic as per ICU team.  Culture results to follow.  -Echo this month shows EF 55 to 79%, normal diastolic function  -Recently hospitalized with breast abscess, status post drainage. During ID evaluation thought to be less likely of pneumonia. Discussed above plan in detail with ER team/ICU team regarding close monitoring of renal parameters, electrolytes levels, need for dialysis and they agree with above recommendations. HISTORY OF PRESENT ILLNESS:  Requesting Physician: Rodrigo Oshea DO  Reason for Consult: SEBASTIAN, hyperkalemia    Akshat Cronin is a 72y.o. year old female who was admitted to [facility] after presenting with worsening shortness of breath. A renal consultation is requested today for assistance in the management of SEBASTIAN, hyperkalemia. Patient was recently hospitalized when she was found to have breast abscess requiring drain, also concern for sepsis with initial concern for pneumonia although this thought to be less likely upon ID evaluation. Eventually was discharged to nursing home. Now presented with worsening shortness of breath. Due to significant respiratory distress, now patient remains intubated at the time of my encounter. Unable to obtain any history from patient as patient is intubated. All the history is obtained from reviewing medical records and talking to ER staff. Upon arrival patient was found to have severe respiratory distress, also severe hypotension now requiring multiple vasopressors.   Urine output remains poor.    PAST MEDICAL HISTORY:  Past Medical History:   Diagnosis Date   • SEBASTIAN (acute kidney injury) ([address]) 01/23/2023   • Anemia    • Asthma    • Atrial fibrillation ([address])    • Chronic kidney disease    • COVID-19 January 2022   • Diabetes mellitus ([address])    • GERD (gastroesophageal reflux disease)    • Hyperlipidemia    • Hypertension    • PONV (postoperative nausea and vomiting)    • Sleep apnea     wear BIPAP   • SVT (supraventricular tachycardia) ([address])        PAST SURGICAL HISTORY:  Past Surgical History:   Procedure Laterality Date   • APPENDECTOMY     • BREAST BIOPSY Right     years ago when she was 23   • BREAST BIOPSY Right 03/13/2023   • BREAST LUMPECTOMY Right 5/30/2023    Procedure: RIGHT BREAST KRIS  DIRECTED LUMPECTOMY - Jennifer Sorensen;  Surgeon: Fauzia Urias MD;  Location: [facility];  Service: Surgical Oncology   • CYSTOSCOPY W/ LASER LITHOTRIPSY Left 07/12/2016    Procedure: CYSTOSCOPY URETEROSCOPY WITH LITHOTRIPSY HOLMIUM LASER, RETROGRADE PYELOGRAM AND INSERTION STENT URETERAL;  Surgeon: Andry Mercado MD;  Location: [facility];  Service:    • DILATION AND CURETTAGE OF UTERUS     • IR DRAINAGE TUBE CHECK/CHANGE/REPOSITION/REINSERTION/UPSIZE  8/18/2023   • IR DRAINAGE TUBE CHECK/CHANGE/REPOSITION/REINSERTION/UPSIZE  8/28/2023   • IR DRAINAGE TUBE PLACEMENT  8/18/2023   • IR THORACENTESIS  03/18/2022   • JOINT REPLACEMENT      right knee   • KNEE ARTHROPLASTY Right    • MAMMO NEEDLE LOCALIZATION LEFT (ALL INC) EACH ADD Right 4/24/2023   • MAMMO STEREOTACTIC BREAST BIOPSY RIGHT (ALL INC) Right 03/13/2023    High Risk Lesion   • MI ARTHROPLASTY GLENOHUMERAL JOINT TOTAL SHOULDER Left 03/01/2022    Procedure: ARTHROPLASTY SHOULDER REVERSE;  Surgeon: Dez Martinez MD;  Location: [facility];  Service: Orthopedics   • MI CYSTO BLADDER W/URETERAL CATHETERIZATION Left 06/29/2016    Procedure: CYSTOSCOPY RETROGRADE PYELOGRAM WITH INSERTION STENT URETERAL, left;  Surgeon: Yisel Downs MD;  Location: [facility];  Service: Urology   • SHOULDER ARTHROTOMY Left        ALLERGIES:  Allergies   Allergen Reactions   • Penicillins Hives   • Moxifloxacin Other (See Comments)     unknown   • Oxycodone-Acetaminophen Other (See Comments)     GI upset   • Zinc Acetate Other (See Comments)     unknown   • Penicillins Hives   • Asa [Aspirin] GI Intolerance   • Indocin [Indomethacin] Other (See Comments)     Made patient "loopy"   • Other Other (See Comments)     unknown   • Tannic Acid Rash       SOCIAL HISTORY:  Social History     Substance and Sexual Activity   Alcohol Use Not Currently    Comment: rarely     Social History     Substance and Sexual Activity   Drug Use Never     Social History     Tobacco Use   Smoking Status Former   • Packs/day: 1.00   • Years: 20.00   • Total pack years: 20.00   • Types: Cigarettes   • Quit date: 1996   • Years since quittin.1   • Passive exposure: Past   Smokeless Tobacco Never       FAMILY HISTORY:  Family History   Problem Relation Age of Onset   • Heart disease Mother    • Diabetes Father    • Thyroid cancer Sister    • Heart disease Brother    • Diabetes Brother    • Heart disease Brother    • Heart disease Brother    • Emphysema Maternal Grandmother    • Heart disease Family        MEDICATIONS:    Current Facility-Administered Medications:   •  amiodarone (CORDARONE) 900 mg in dextrose 5 % 500 mL infusion, 1 mg/min, Intravenous, Continuous **FOLLOWED BY** [START ON 2023] amiodarone (CORDARONE) 900 mg in dextrose 5 % 500 mL infusion, 0.5 mg/min, Intravenous, Continuous, Keegan Chappell MD  •  amiodarone 150 mg in dextrose 5 % 100 mL IV bolus, 150 mg, Intravenous, Once, Starla Senior MD  •  furosemide (LASIX) 120 mg in dextrose 5 % 50 mL IVPB, 120 mg, Intravenous, Once, Geovanna Baugh MD  •  hydrocortisone (Solu-CORTEF) injection 200 mg, 200 mg, Intravenous, Once, Mg Landis DO  •  lactated ringers bolus 1,000 mL, 1,000 mL, Intravenous, Once, Rodrigo Oshea DO  •  norepinephrine (LEVOPHED) 4 mg (STANDARD CONCENTRATION) IV in sodium chloride 0.9% 250 mL, 1-30 mcg/min, Intravenous, Titrated, Keegan Hylton MD, Last Rate: 75 mL/hr at 08/28/23 1846, 20 mcg/min at 08/28/23 1846  •  propofol (DIPRIVAN) 1000 mg in 100 mL infusion (premix), 5-50 mcg/kg/min, Intravenous, Titrated, Rodrigo Oshea DO  •  sodium chloride 0.9 % bolus 1,000 mL, 1,000 mL, Intravenous, Once, Joaquim Stanton MD  •  vasopressin (PITRESSIN) 20 Units in sodium chloride 0.9 % 100 mL infusion, 0.04 Units/min, Intravenous, Continuous, Rodrigo Oshea DO, Last Rate: 12 mL/hr at 08/28/23 1858, 0.04 Units/min at 08/28/23 1858    Current Outpatient Medications:   •  albuterol (PROVENTIL HFA,VENTOLIN HFA) 90 mcg/act inhaler, Inhale 2 puffs every 6 (six) hours as needed for wheezing, Disp: , Rfl:   •  ammonium lactate (LAC-HYDRIN) 12 % lotion, APPLY TOPICALLY TO AFFECTED AREAS twice a day, Disp: , Rfl:   •  apixaban (ELIQUIS) 5 mg, Take 1 tablet (5 mg total) by mouth 2 (two) times a day, Disp: 60 tablet, Rfl: 2  •  B Complex-C-Folic Acid (triphrocaps) 1 MG CAPS, Take 1 capsule (1 mg total) by mouth daily, Disp: 30 capsule, Rfl: 5  •  doxycycline (ADOXA) 100 MG tablet, Take 1 tablet (100 mg total) by mouth 2 (two) times a day for 9 days, Disp: 18 tablet, Rfl: 0  •  ferrous sulfate 325 (65 Fe) mg tablet, Take 1 tablet (325 mg total) by mouth daily with breakfast Do not start before August 23, 2023., Disp: 30 tablet, Rfl: 0  •  fluticasone (FLONASE) 50 mcg/act nasal spray, 1 spray into each nostril daily, Disp: , Rfl: 0  •  Fluticasone-Salmeterol (Wixela Inhub) 250-50 mcg/dose inhaler, Inhale 1 puff 2 (two) times a day Rinse mouth after use., Disp: 60 blister, Rfl: 3  •  glucose blood (OneTouch Verio) test strip, Use 1 each 4 (four) times a day Use as instructed, Disp: 400 strip, Rfl: 1  •  insulin lispro (HumaLOG KwikPen) 100 units/mL injection pen, Use 10 units prior to each meal +1 unit per 50 above 150 mg/dL, Disp: 15 mL, Rfl: 2  •  Insulin Pen Needle (BD Pen Needle Pilar 2nd Gen) 32G X 4 MM MISC, For use with insulin pen. Pharmacy may dispense brand covered by insurance., Disp: 100 each, Rfl: 0  •  Insulin Pen Needle (NovoFine Autocover) 30G X 8 MM MISC, Inject under the skin daily, Disp: 100 each, Rfl: 3  •  Insulin Pen Needle 30G X 8 MM MISC, 2 (two) times a day, Disp: , Rfl:   •  Insulin Pen Needle 31G X 8 MM MISC, Use daily Inject under the skin, Disp: 100 each, Rfl: 2  •  Lancets (onetouch ultrasoft) lancets, Use once daily, Disp: 100 each, Rfl: 2  •  metoprolol tartrate (LOPRESSOR) 50 mg tablet, Take 1 tablet (50 mg total) by mouth every 12 (twelve) hours, Disp: 60 tablet, Rfl: 0  •  montelukast (SINGULAIR) 10 mg tablet, take 1 tablet by mouth at bedtime, Disp: 30 tablet, Rfl: 1  •  NovoFine Autocover Pen Needle 30G X 8 MM MISC, USE TWICE A DAY, Disp: 300 each, Rfl: 5  •  nystatin (MYCOSTATIN) powder, Apply topically 2 (two) times a day, Disp: 15 g, Rfl: 0  •  pantoprazole (PROTONIX) 40 mg tablet, Take 1 tablet (40 mg total) by mouth daily, Disp: 30 tablet, Rfl: 0  •  polyethylene glycol (MIRALAX) 17 g packet, Take 17 g by mouth daily Do not start before August 23, 2023., Disp: 510 g, Rfl: 0  •  rosuvastatin (CRESTOR) 10 MG tablet, take 1 tablet by mouth once daily, Disp: 90 tablet, Rfl: 1  •  senna (SENOKOT) 8.6 mg, Take 2 tablets (17.2 mg total) by mouth daily at bedtime, Disp: 60 tablet, Rfl: 0  •  sodium chloride, PF, 0.9 %, 10 mL by Intracatheter route daily Intracatheter flushing daily.  May substitute prefilled syringe with normal saline 10 mL vials, 10 mL syringes, and 18 g blunt needles, Disp: 300 mL, Rfl: 2  •  torsemide (DEMADEX) 10 mg tablet, Take 1 tablet (10 mg total) by mouth daily Do not start before August 23, 2023., Disp: 30 tablet, Rfl: 0  •  Toujeo SoloStar 300 units/mL CONCENTRATED U-300 injection pen (1-unit dial), inject 25 units subcutaneously daily at bedtime, Disp: 4.5 mL, Rfl: 2    REVIEW OF SYSTEMS:  Unable to obtain any review of system as patient is currently intubated. PHYSICAL EXAM:  Current Weight:    First Weight:    Vitals:    08/28/23 1845   BP: (!) 73/30   Pulse: (!) 149   Resp:    Temp:    SpO2:      No intake or output data in the 24 hours ending 08/28/23 1909  Wt Readings from Last 3 Encounters:   08/22/23 120 kg (264 lb)   08/17/23 129 kg (284 lb 6.3 oz)   08/02/23 119 kg (263 lb 3.2 oz)     Temp Readings from Last 3 Encounters:   08/28/23 98.4 °F (36.9 °C) (Rectal)   08/22/23 97.8 °F (36.6 °C)   08/02/23 (!) 97.2 °F (36.2 °C) (Tympanic)     BP Readings from Last 3 Encounters:   08/28/23 (!) 73/30   08/22/23 148/74   08/02/23 126/66     Pulse Readings from Last 3 Encounters:   08/28/23 (!) 149   08/22/23 63   08/02/23 63        Physical Examination:  Eyes: Mild conjunctival pallor present  ENT: External examination of ear and nose unremarkable, ET tube present  Neck: No obvious lymphadenopathy appreciated  Respiratory: Decreased breath sound at base  CVS: Trace edema in legs. GI: Soft, nondistended  CNS: Intubated, sedated  Skin: No new rash  Musculoskeletal: No obvious new gross deformity noted    Invasive Devices:      Lab Results:   Results from last 7 days   Lab Units 08/28/23  1613 08/22/23  0415   WBC Thousand/uL 17.85* 9.59   HEMOGLOBIN g/dL 9.5* 8.5*   HEMATOCRIT % 31.2* 28.0*   PLATELETS Thousands/uL 680* 280   POTASSIUM mmol/L 7.4* 4.7   CHLORIDE mmol/L 98 109*   CO2 mmol/L 21 28   BUN mg/dL 91* 67*   CREATININE mg/dL 7.38* 2.07*   CALCIUM mg/dL 9.0 8.7       Other Studies:   XR chest 1 view portable   Final Result by Carlton Mccartney MD (08/28 1629)      Increase in left effusion, moderate, with moderate left base atelectasis. Pneumonia not excluded in the appropriate clinical setting. Persistent trace right effusion.                Workstation performed: [id number]         XR chest 1 view portable    (Results Pending)   XR chest 1 view portable    (Results Pending)   Initial chest X images personally reviewed which shows pleural effusion. Portions of the record may have been created with voice recognition software. Occasional wrong word or "sound a like" substitutions may have occurred due to the inherent limitations of voice recognition software. Read the chart carefully and recognize, using context, where substitutions have occurred.

## 2023-08-28 NOTE — TELEPHONE ENCOUNTER
BMP in system    ----- Message from Stan Christie MD sent at 8/22/2023  9:05 AM EDT -----  Hi,     Mrs Aquilino Montoya will be discharged from [facility] in the next few days and she will need a follow-up appointment within 4 to 5 weeks of discharge with BMP    Thank you    Shad Brunson

## 2023-08-28 NOTE — TREATMENT PLAN
Renal addendum:    Despite getting 3L IV fluid bolus, there is no urine output. continue oneal catheter. Given severe hyperkalemia, worsening lactic acidosis, and underlying shock requiring multiple vasopressors, will start CRRT. Plan to start 0K bath with close monitoring of BMP every 4 hourly and once serum potassium less than 5, change to 4K bath.

## 2023-08-29 PROBLEM — I46.9 CARDIAC ARREST (HCC): Status: ACTIVE | Noted: 2023-01-01

## 2023-08-29 PROBLEM — I31.39 PERICARDIAL EFFUSION: Status: ACTIVE | Noted: 2023-01-01

## 2023-08-29 NOTE — ASSESSMENT & PLAN NOTE
· Recent admission to [facility] from 8/11-8/22/23 due to septic shock from RLL pneumonia and a right breast abscess status post IR drainage on 8/18. MRSA (+) and blood cultures on 8/11/23 and 8/16 (-). Body fluid culture from the breast (-)  · Patient was given IV ceftriaxone, broadened to cefepime and vancomycin while admitted. · Discharged on oral doxycycline for total of 14 days of antibiotics. · 8/28: While in the ED, patient meeting septic shock criteria based on leukocytosis, hypotension, tachycardia, new SEBASTIAN, requiring levophed and vasopressin.  Started on Cefepime  · While in ICU, pressor requirements have been steadily increasing with addition of epi  · S/p thoracentesis in the ED, pending pleural fluid studies will need follow-up  · Follow-up UA  · Follow-up blood cultures  · Continue cefepime, vancomycin, flagyl   · Monitor R breast abscess

## 2023-08-29 NOTE — ED NOTES
Called [facility] and gave update on patient whereabouts and status. Spoke w/ Devyn Floyd.       Kiersten Ferguson RN  08/28/23 7036

## 2023-08-29 NOTE — ASSESSMENT & PLAN NOTE
· 8/29 around midnight: Code blue called for bradycardia followed by cardiac arrest most likely in setting of hyperkalemia from renal failure.  Cannot rule out possibility of cardiac arrest from possible tamponade  · ROSC received with 2 rounds of epi, multiple doses of bicarb, hyperkalemia temporizing therapy  · F/u echo, cardio consult

## 2023-08-29 NOTE — PROGRESS NOTES
23 1200   Clinical Encounter Type   Visited With Patient and family together;Family   Crisis Visit Critical Care;Death   Referral From Nurse   Jew Encounters   Jew Needs Prayer   Patient Spiritual Encounters   Spiritual Encounter Notes Family seemed very loving and supportive. Pt extubated and  shortly thereafter.    Family Spiritual Encounters   Family Coping Sadness;Open/discussion   Family Normalization 5   Family Participation in Care 5   Family Support During Treatment 5   Caregiver-Patient Relationship 5

## 2023-08-29 NOTE — ASSESSMENT & PLAN NOTE
· Most likely in setting of lactic acidosis in addition to renal failure  · Continue mechanical ventilation with goal pH of >=7.2  · Given multiple doses of bicarb   · Nephro consulted and following

## 2023-08-29 NOTE — ASSESSMENT & PLAN NOTE
· Initial potassium in the ED of 7.2 s/p  calcium gluconate, albuterol, insulin, D5, Lasix given in the ED.   · Start CRRT  · Nephro consulted and following  · Continue to monitor electrolytes  · Continue monitor ECG for cardiac changes

## 2023-08-29 NOTE — PROCEDURES
Arterial Line Insertion    Date/Time: 8/29/2023 2:01 AM    Performed by: Mandeep David MD  Authorized by: Mandeep David MD    Patient location:  ICU  Other Assisting Provider: Yes (comment) (Alyson Leigh, ICU AP)    Consent:     Consent obtained:  Emergent situation  Universal protocol:     Patient identity confirmed:  Arm band and hospital-assigned identification number  Indications:     Indications: hemodynamic monitoring, multiple ABGs, continuous blood pressure monitoring and frequent labs / infusion    Pre-procedure details:     Skin preparation:  Chlorhexidine    Preparation: Patient was prepped and draped in sterile fashion    Anesthesia (see MAR for exact dosages): Anesthesia method:  None  Procedure details:     Location / Tip of Catheter:  Radial    Laterality:  Left    Needle gauge:  20 G    Number of attempts:  2    Successful placement: yes      Transducer: waveform confirmed    Post-procedure details:     Post-procedure:  Sterile dressing applied and sutured    Patient tolerance of procedure:   Tolerated well, no immediate complications

## 2023-08-29 NOTE — ASSESSMENT & PLAN NOTE
Lab Results   Component Value Date    HGBA1C 9.2 (H) 08/18/2023       Recent Labs     08/28/23  1514   POCGLU 135       Blood Sugar Average: Last 72 hrs:  (P) 135     · Sliding-scale insulin while admitted

## 2023-08-29 NOTE — ED NOTES
Paola from [facility] updated on patient status and requested a call when patient is admitted.  Phone number: [phone], Extension: LUISITO Ann  08/28/23 5332

## 2023-08-29 NOTE — DISCHARGE SUMMARY
Discharge Summary - Cal Nearing 72 y.o. female MRN: [id number]    Unit/Bed#: ICU 01 Encounter: [id number] PCP: Julianne Alejandra MD    Admission Date:   Admission Orders (From admission, onward)     Ordered        08/28/23 2103  Inpatient Admission  Once                        Admitting Diagnosis: Cough [R05.9]  SOB (shortness of breath) [R06.02]  Acute renal failure, unspecified acute renal failure type ([address]) [N17.9]    HPI: per Dr. Gil Alcaraz  "Patient is a 65-year-old female with past medical history significant for asthma, A-fib on [facility], DM, breast cancer on no therapy who presented on 8/28/2023 from her facility with shortness of breath, abdominal pain, nausea and vomiting. Patient had a recent admission to [facility] from 8-11 /8-22 in the setting of infection. Patient was initially admitted to general medical floor but subsequently required ICU level care. Initial thoughts were for pneumonia but patient was subsequently found to have a breast abscess. This was treated with antibiotics converted to oral for discharge. Additionally, the patient had a drain placed in her right breast.  This remained in place at the facility but was removed on the day of admission. Per family, while at the facility, the patient appeared to be "yellow". Additionally, the patient was complaining of nausea as well as periods of vomiting. She did have some chills as well. She denied burning or pain with urination, productive cough. Per family, patient was feeling slightly better over the course of her stay at the facility.     Upon arrival, patient was hypoxemic. Patient was refractory to BiPAP and was subsequently intubated. Patient a progressive shock thought to be of septic source. Patient had rapidly increasing pressor requirements. Given her acute renal failure, patient was initiated on CVVH as well.   Patient underwent thoracentesis emergency department."    Procedures Performed:   Orders Placed This Encounter Procedures   • Central Line   • Central Line   • Intubation       Summary of Hospital Course:   Patient was hypoxic and hypotensive in ED and subsequently intubated and started on vasopressors. Thoracentesis was performed in ED for pleural effusion. Hyperkalemia in ED was medically treated with insulin, dextrose and albuterol. She was started on Cefepime, Vancomycin and Flagyl for potential septic shock. Levo was started. She was transferred to ICU, became bradycardic around midnight and had PEA arrest. Chest compressions and two rounds of epinephrine, calcium and bicarb was given, ROSC obtained after 10 minutes. Patient had increased vasopressor requirements. Hyperkalemia persisted and was treated medically in ICU. Lactic acid initially 6, increased to 16. Metabolic acidosis with pH 7.1 and bicarb 14. Patient was placed on CVVH due to acidosis and renal failure. Patient was started on Hydrocortisone, Na bicarb infusion, Levo, Vaso and Epinephrine. Adjusted ventilator rate for acidosis. Patient eventually needed to add on Hai and methylene blue. Added Micafungin for broader coverage. Florinef was ordered for adrenal insufficiency. Patient also had coagulopathy with undetectable INR and PT >120. CT chest abdomen and pelvis with pericholecystic fluid and perihepatic fluid, right breast abscess, Left upper lobe consolidation and atelectasis. Despite medical management, patient was persistently hypotensive and acidotic. Discussion with surgery team for possible source control but patient would not be stable enough for OR. Patient family was bedside this morning and present during rounds. Despite increased pressors, microbial coverage and sodium bicarb, patient continued to become hypotensive and acidotic. After discussion, family decided to make patient comfort care. Patient was extubated and passed shortly after, prior to stopping infusions.      Significant Findings, Care, Treatment and Services Provided: · Imaging: pericholecystic fluid, thickened gallbladder wall, hepatomegaly, pleural effusion, pericardial effusion, left upper consolidation, atelectasis   · Treatment: Na bicarb, Vaso, Levo, Epi, Hai, Methylene blue, Amiodarone    Complications: n/a    Disposition:      Final Diagnosis: presumed Septic Shock, Mixed acidosis, Renal failure    Medical Problems     Resolved Problems  Date Reviewed: 2023   None           Date, Time and Cause of Death    Date of Death: 23  Time of Death: 11:21 AM  Preliminary Cause of Death: Septic shock ([address])  Entered by: Keith Mcgowan PA-C[MD1.1]     Attribution     MD1.1 Geovani Martinez PA-C 23 11:46          Death Note:    INPATIENT DEATH NOTE  Elinor Lou 72 y.o. female MRN: [id number]  Unit/Bed#: ICU 01 Encounter: [id number]    Date, Time and Cause of Death    Date of Death: 23  Time of Death: 11:21 AM  Preliminary Cause of Death: Septic shock ([address])  Entered by: Keith Mcgowan PA-C[MD1.1]     Attribution     MD1.1 Geovani Martinez PA-C 23 11:46           Patient's Information  Pronounced by: Keith Mcgowan PA-C  Did the patient's death occur in the ED?: No  Did the patient's death occur in the OR?: No  Did the patient's death occur less than 10 days post-op?: No  Did the patient's death occur within 24 hours of admission?: Yes  Was code status DNR at the time of death?: Yes    PHYSICAL EXAM:  Spontaneous respirations absent, Breath sounds absent, Carotid pulse absent and Heart sounds absent    Medical Examiner notification criteria:  Patient  within 24 hours of arrival to hospital   Medical Examiner's office notified?:  Yes  Medical Examiner accepted case?:  No  Name of Medical Examiner: Laury Gillett    Family Notification  Was the family notified?: Yes  Date Notified: 23  Time Notified: 542  Notified by: Care team   Relationship to Patient: Other relative  Family Notification Route:  At bedside  Was the family told to contact a  home?: Yes    Autopsy Options:  Post-mortem examination authorized by next of kin. Consent form completed.     Primary Service Attending Physician notified?:  yes - Attending: Stephanie Hernandez  Physician/Resident responsible for completing Discharge Summary:  Mary Mcgrath PA-C

## 2023-08-29 NOTE — ASSESSMENT & PLAN NOTE
Lab Results   Component Value Date    HGBA1C 9.2 (H) 08/18/2023       Recent Labs     08/28/23  1514   POCGLU 135       Blood Sugar Average: Last 72 hrs:  (P) 135   Sliding-scale insulin while admitted

## 2023-08-29 NOTE — ASSESSMENT & PLAN NOTE
· Status post IR drainage 8/18/2023  · Fluid cultures negative from breast from last admission at [facility]  · Flagyl started to cover anaerobes; can discontinue if cultures (-)  · Wound care as needed  · Monitor for any drainage

## 2023-08-29 NOTE — PHYSICAL THERAPY NOTE
Physical Therapy Cancellation Note         08/29/23 1051   Note Type   Note type Cancelled Session   Cancel Reasons Medical status   Additional Comments PT orders received. Chart reviewed. Per ICU mobility rounds, pt currently intubated and receiving CVVH. At this time, will hold PT evaluation and f/u as appropriate.      Nick Claire, PT

## 2023-08-29 NOTE — ASSESSMENT & PLAN NOTE
· History of A-fib, anticoagulated with Eliquis, rate control with metoprolol 50 mg bid  · Will contunue eliquis while admitted  · Will hold home Metoprolol for now in setting of hypotension  · While in the ED, patient was noted to be on A-fib requiring an amiodarone bolus followed by infusion, patient converted to normal sinus rhythm  · Continue amiodarone infusion  · Cardiology consult  · Follow-up echocardiogram

## 2023-08-29 NOTE — PROCEDURES
NEPHROLOGY DIALYSIS PROCEDURE NOTE      Patient seen and examined on CVVHD at 9:45 AM, tolerating procedure well. All documentation, labs, medications were reviewed by myself. Discussed with the ICU nurse. CRRT Prescription: CVVHD, therapy fluid rate 3 L/h, blood flow rate 300 mL/min, running +100 mL/hr  Vitals: Currently normotensive maintaining a MAP in the 70s  Access: Nontunneled dialysis catheter  Bath: 4K/3 calcium  Gtts: Epinephrine drip, norepinephrine drip, phenylephrine drip, vasopressin, propofol, sodium bicarbonate drip at 200 mL/hr  Overnight Events: Started on CVVHD on August 28    Summary:    82-year-old female with significant medical issues of CKD, diastolic CHF, diabetes, GERD, hyperlipidemia, A-fib, presented from nursing home due to worsening respiratory distress. We are consulted for SEBASTIAN/CKD management. Assessment/Plan:    Acute renal failure/acute tubular necrosis  -- Present on admission, admission creatinine 7.3 mg/dL  -- Underlying chronic kidney disease and recent episode of acute kidney injury  -- Oliguric, with severe metabolic acidosis, and hyperkalemia --> subsequent started on continuous renal placement therapy  -- CVVHD started on August 28  -- Access: Temporary nontunneled dialysis catheter in place, Gomez catheter in place  -- Remains persistently acidemic, potassium level has improved  --PCP confirmed patient never received Bactrim  -- Hemodynamically unstable on 4 pressors  -- Acute tubular necrosis in the setting of cardiac arrest and hypotension leading to ischemic ATN  -- Maintain mean arterial pressure in 65  -- Continue CVVHD continue to run positive if starts to become volume overloaded can start to run even and monitor volume status closely.     Mixed acid-base disorder  -- Lactic acidosis with a high anion gap metabolic acidosis and a respiratory acidosis  -- Currently on a bicarbonate drip at 200 mL/h  -- Currently on CVVHD  -- Continue to trend lactic acid level  -- Likely type a lactic acidosis in the setting of cardiac arrest and hypotension  -- Intubated and vent management as per the ICU team  -- Arterial blood gas reviewed still remains acidemic  -- Plan to receive methylene blue    Ventilatory dependent respiratory failure  -- 50% FiO2  -- Plan to receive methylene blue    Hyperkalemia--resolved  -- Continue CVVHD  -- Currently on a 4K bath    Blood pressure/volume status  -- History of hypertension  -- 2D echocardiogram from this month showed a preserved ejection fraction of 55 to 60% with normal diastolic function  -- Patient significantly hypotensive on 4 pressors    Cardiac arrest  -- Bradycardia in the setting of hyperkalemia  -- Received 2 rounds of epinephrine and achievement of ROSC yes    Right breast cancer with an abscess  -- Status postlumpectomy on May 2023 and IR drainage in 2023    Diabetes mellitus type 2  -- Last hemoglobin A1c was 9.2    Chronic kidney disease stage III  -- Baseline creatinine 1.5 to 2 mg/dL recent episode of acute kidney injury  -- Outpatient nephrologist Dr. Mary Carmen Fish      Review of Systems: Unable to obtain review of systems as patient is currently intubated    Physical Exam:    General: Intubated and sedated on the ventilator, critically ill-appearing  Skin: Poor skin turgor, no rash  CVS: S1-S2 appreciated, intermittent tachycardia  Lungs: Coarse breath sounds bilaterally  Abdomen: Nontender nondistended  Access: Temporary dialysis catheter with no erythema or exudate  Extremities: No significant edema  Neuro: Sedated on the ventilator          Current Facility-Administered Medications:   •  albuterol (PROVENTIL HFA,VENTOLIN HFA) inhaler 2 puff, 2 puff, Inhalation, Q6H PRN, Nelson Nielsen MD  •  [] amiodarone (CORDARONE) 900 mg in dextrose 5 % 500 mL infusion, 1 mg/min, Intravenous, Continuous, Stopped at 23 0131 **FOLLOWED BY** amiodarone (CORDARONE) 900 mg in dextrose 5 % 500 mL infusion, 0.5 mg/min, Intravenous, Continuous, Mark Stephens MD, Last Rate: 16.7 mL/hr at 08/29/23 0125, 0.5 mg/min at 08/29/23 0125  •  atorvastatin (LIPITOR) tablet 40 mg, 40 mg, Oral, Daily With Rito Ramírez MD  •  bisacodyl (DULCOLAX) rectal suppository 10 mg, 10 mg, Rectal, Daily PRN, Mark Stephens MD  •  cefepime (MAXIPIME) 2 g/50 mL dextrose IVPB, 2,000 mg, Intravenous, Q12H, Mark Stephens MD, Stopped at 08/29/23 0700  •  chlorhexidine (PERIDEX) 0.12 % oral rinse 15 mL, 15 mL, Mouth/Throat, Q12H [facility], Mark Stephens MD, 15 mL at 08/29/23 0949  •  EPINEPHrine 5,000 mcg (STANDARD CONCENTRATION) IV in sodium chloride 0.9% 250 mL, 1-20 mcg/min, Intravenous, Titrated, Mark Stephens MD, Last Rate: 60 mL/hr at 08/29/23 0914, 20 mcg/min at 08/29/23 0914  •  fluticasone (FLONASE) 50 mcg/act nasal spray 1 spray, 1 spray, Nasal, Daily, Mark Stephens MD  •  hydrocortisone (Solu-CORTEF) injection 50 mg, 50 mg, Intravenous, Q6H [facility], Mark Stephens MD, 50 mg at 08/29/23 0539  •  insulin lispro (HumaLOG) 100 units/mL subcutaneous injection 2-12 Units, 2-12 Units, Subcutaneous, Q6H [facility] **AND** Fingerstick Glucose (POCT), , , Q6H, Mark Stephens MD  •  methylene blue (PROVAYBLUE) 150 mg in dextrose 5 % 50 mL IVPB, 150 mg, Intravenous, Once, RK Rogers, Last Rate: 50 mL/hr at 08/29/23 0930, 150 mg at 08/29/23 0930  •  metroNIDAZOLE (FLAGYL) IVPB (premix) 500 mg 100 mL, 500 mg, Intravenous, Q8H, Mark Stephens MD, Last Rate: 200 mL/hr at 08/29/23 0945, 500 mg at 08/29/23 0945  •  micafungin (MYCAMINE) 100 mg in sodium chloride 0.9 % 100 mL IVPB, 100 mg, Intravenous, Q24H, Lanette Azul PA-C, Last Rate: 100 mL/hr at 08/29/23 0727, 100 mg at 08/29/23 9451  •  montelukast (SINGULAIR) tablet 10 mg, 10 mg, Oral, HS, Mark Stephens MD, 10 mg at 08/28/23 2311  •  norepinephrine (LEVOPHED) 8 mg (DOUBLE CONCENTRATION) IV in sodium chloride 0.9% 250 mL, 1-30 mcg/min, Intravenous, Titrated, Mark Stephens MD, Last Rate: 56.3 mL/hr at 08/29/23 0113, 30 mcg/min at 08/29/23 0113  •  NxStage K 4/Ca 3 dialysis solution (RFP-401) 20,000 mL, 20,000 mL, Dialysis, Continuous, Wilmer Vegas MD, 20,000 mL at 08/29/23 4669  •  nystatin (MYCOSTATIN) powder, , Topical, BID, Christal Cavazos MD, 1 Application at 52/68/89 3553  •  pantoprazole (PROTONIX) EC tablet 40 mg, 40 mg, Oral, Early Morning, Christal Cavazos MD, 40 mg at 08/29/23 0539  •  phenylephrine (CELINA-SYNEPHRINE) 100 mg (DOUBLE CONCENTRATION) IV in sodium chloride 0.9% 250 mL,  mcg/min, Intravenous, Titrated, Pardeep Tobar PA-C, Last Rate: 27 mL/hr at 08/29/23 0931, 180 mcg/min at 08/29/23 0931  •  polyethylene glycol (MIRALAX) packet 17 g, 17 g, Oral, Daily, Christal Cavazos MD, 17 g at 08/29/23 0006  •  propofol (DIPRIVAN) 1000 mg in 100 mL infusion (premix), 5-50 mcg/kg/min, Intravenous, Titrated, Christal Cavazos MD, Stopped at 08/28/23 2230  •  senna (SENOKOT) tablet 17.2 mg, 17.2 mg, Oral, HS, Christal Cavazos MD, 17.2 mg at 08/28/23 2311  •  sodium bicarbonate 150 mEq in dextrose 5 % 1,000 mL infusion, 200 mL/hr, Intravenous, Continuous, Chelsey MarineMARLENENP, Last Rate: 200 mL/hr at 08/29/23 0706, 200 mL/hr at 08/29/23 0706  •  vancomycin (VANCOCIN) IVPB (premix in dextrose) 750 mg 150 mL, 10 mg/kg (Adjusted), Intravenous, Q12H, Suzanne Witt DO  •  vasopressin (PITRESSIN) 20 Units in sodium chloride 0.9 % 100 mL infusion, 0.04 Units/min, Intravenous, Continuous, Christal Cavazos MD, Last Rate: 12 mL/hr at 08/28/23 2218, 0.04 Units/min at 08/28/23 2218

## 2023-08-29 NOTE — PROCEDURES
Arterial Line Insertion    Date/Time: 8/29/2023 8:13 AM    Performed by: RK De La Torre  Authorized by: Yfn Jean, [address] [place] [address]    Patient location:  ICU  Consent:     Consent obtained:  Emergent situation  Universal protocol:     Patient identity confirmed:  Arm band and hospital-assigned identification number  Indications:     Indications: hemodynamic monitoring, multiple ABGs and frequent labs / infusion    Pre-procedure details:     Skin preparation:  Chlorhexidine  Procedure details:     Location / Tip of Catheter:  Radial    Laterality:  Left    Needle gauge:  18 G    Placement technique:  Percutaneous    Number of attempts:  1    Successful placement: yes      Transducer: waveform confirmed    Post-procedure details:     Post-procedure:  Sterile dressing applied and sutured    Patient tolerance of procedure:   Tolerated well, no immediate complications

## 2023-08-29 NOTE — CONSULTS
Consultation - Cardiology Team One  Nemesio Sawant 72 y.o. female MRN: [id number]  Unit/Bed#: ICU 01 Encounter: [id number]    Consults    Physician Requesting Consult: Michelle Cueto DO  Reason for Consult / Principal Problem: AF    Assessment:    1. Cardiac arrest: Bradycardia subsequent cardiac arrest likely in the setting of electrolyte derangement with K+ 7.2 POA. Received 2 rounds of epi, bicarb with achievement of ROSC. Remains intubated and sedated. 2.  PAF with RVR: In the setting of septic shock that converted to NSR this morning around 8 AM on amiodarone infusion. Typically maintained on Lopressor 50 mg BID that is on hold due to pressor requirement. · [id number]: Anticoagulated on Eliquis 5 mg BID on hold as patient intubated    3. Septic shock: In the setting of RLL pneumonia and right breast abscess. Lactic acid up to 16, WBC 39 currently on epinephrine, Levophed, phenylephrine and vasopressin. Antibiotic management per primary team.  · Concern for possible pericardial effusion noted on CTA. Echocardiogram pending. 4.  SEBASTIAN on CKD stage III: Baseline creatinine 1.7. Creatinine 7.38 POA in the setting of hypotension and septic shock. CRRT initiated with nephrology following. Creatinine 4.48 today. 5.  Hyperkalemia: K+ 7.2 POA s/p calcium gluconate, albuterol, insulin, D5 and Lasix in the ED. K+ 5.1 today. 6.  Chronic HFpEF: Volume management per CRRT and nephrology. · Echocardiogram 8/17/2023: EF 55-60% with no WMA, normal diastolic function, normal RV function, normal biatrial size, trace-mild MR, small pericardial effusion with no evidence of tamponade. · Home diuretic regimen: Torsemide 10 mg daily    7. Essential hypertension: Currently with pressor requirement in the setting of septic shock as above. · Medication PTA: Lopressor 50 mg BID    8. Hyperlipidemia: TC 80, , HDL 22, LDL 37 on simvastatin 10 mg daily that is currently on hold as patient intubated.     9. Type II DM: Hemoglobin A1c 9.2. Management per primary team.    10.  Right breast cancer with abscess: s/p lumpectomy 5/2023 and IR drainage 8/18/2023. 11.  Obesity with OHS: BMI 48. Requires CPAP at night. Plan/Recommendations:  · Follow-up on echocardiogram to evaluate heart function and valvular status  · Continue amiodarone infusion in hopes to maintain NSR  · Volume management per nephrology  · Patient severely ill on multiple pressors. Wean as and if able. · Consider initiating heparin infusion if no procedures planned for systemic anticoagulation  · Continue to monitor on telemetry  · Please await attending attestation for final recommendations  ___________________________________________________________________    CC: Cough      History of Present Illness   HPI: Herb Mruphy is a 72y.o. year old female who has chronic HFpEF, PAF, PSVT, essential hypertension, dyslipidemia, type II DM, CKD stage IIIb, KATRINA, OHS, right breast abscess s/p IR drainage 8/18 who follows with cardiologist Dr. Dorota Barber. Patient presented to the emergency room at [address] [place] [address] on 8/28/2023 via EMS [facility] with worsening shortness of breath. Patient was recently diagnosed with pneumonia and breast abscess and undergoing treatment however staff noticed that she had increased shortness of breath, cough with decrease in oxygen saturation on room air. On arrival to the ED patient was hypotensive and tachycardic. In the ED patient was noted to be tripod with shortness of breath and diaphoresis with to give limited history due to acute condition. She was placed on 15 L mid flow NC. CXR revealed increased moderate left effusion, pneumonia not excluded. Labs revealed lactic/metabolic/respiratory acidosis, potassium 7.4, BUN 91, creatinine 7.38, lactic acid 4, WBC 17, hemoglobin 9.5. EKG revealed A-fib with RVR. Patient was admitted with septic shock from RLL pneumonia and right breast abscess. Vasopressin and Levophed were started for hypotension. Amiodarone was initiated for AF with RVR. Nephrology was consulted for SEBASTIAN and patient was started on CRRT. Patient had a CODE BLUE for bradycardia followed by cardiac arrest midnight in the setting of hyperkalemia and renal failure. Patient received 2 rounds of epi, bicarb with achievement of ROSC. CTA was done with report pending. Per primary team note there was concern for moderate-sized pericardial effusion. Echocardiogram was ordered and cardiology has been consulted for further evaluation and management of rated cardia and PAF. Home medication regimen includes Eliquis 5 mg BID, Lopressor 50 mg BID, rosuvastatin 10 mg daily, torsemide 10 mg daily. Patient intubated and sedated. Echocardiogram 8/17/2023: EF 55-60% with no WMA, normal diastolic function, normal RV function, normal biatrial size, trace-mild MR, small pericardial effusion with no evidence of tamponade. Nuclear stress test 8/26/2022: No evidence of ischemia. Breast attenuation left arm field image as patient unable to raise arm due to recent surgery. EKG reviewed personally: 8/29/2023-sinus tachycardia rate of 101 bpm with first-degree AV block. When compared to the EKG from 8/28/2023 A-fib with RVR at a rate of 119 bpm was noted. Telemetry reviewed personally: AF with RVR with conversion to NSR around 8 AM with no conversion pauses. Currently in NSR with rates in the 90s.       Review of Systems   Unable to perform ROS: intubated     Historical Information   Past Medical History:   Diagnosis Date   • SEBASTIAN (acute kidney injury) ([address]) 01/23/2023   • Anemia    • Asthma    • Atrial fibrillation [facility])    • Chronic kidney disease    • COVID-19 January 2022   • Diabetes mellitus ([address])    • GERD (gastroesophageal reflux disease)    • Hyperlipidemia    • Hypertension    • PONV (postoperative nausea and vomiting)    • Sleep apnea     wear BIPAP   • SVT (supraventricular tachycardia) ([address])      Past Surgical History:   Procedure Laterality Date   • APPENDECTOMY     • BREAST BIOPSY Right     years ago when she was 23   • BREAST BIOPSY Right 03/13/2023   • BREAST LUMPECTOMY Right 5/30/2023    Procedure: RIGHT BREAST KRIS  DIRECTED LUMPECTOMY - Raphael Estrada;  Surgeon: Mindi Das MD;  Location: [facility];  Service: Surgical Oncology   • CYSTOSCOPY W/ LASER LITHOTRIPSY Left 07/12/2016    Procedure: CYSTOSCOPY URETEROSCOPY WITH LITHOTRIPSY HOLMIUM LASER, RETROGRADE PYELOGRAM AND INSERTION STENT URETERAL;  Surgeon: Shana Dejesus MD;  Location: [facility];  Service:    • DILATION AND CURETTAGE OF UTERUS     • IR DRAINAGE TUBE CHECK/CHANGE/REPOSITION/REINSERTION/UPSIZE  8/18/2023   • IR DRAINAGE TUBE CHECK/CHANGE/REPOSITION/REINSERTION/UPSIZE  8/28/2023   • IR DRAINAGE TUBE PLACEMENT  8/18/2023   • IR THORACENTESIS  03/18/2022   • JOINT REPLACEMENT      right knee   • KNEE ARTHROPLASTY Right    • MAMMO NEEDLE LOCALIZATION LEFT (ALL INC) EACH ADD Right 4/24/2023   • MAMMO STEREOTACTIC BREAST BIOPSY RIGHT (ALL INC) Right 03/13/2023    High Risk Lesion   • WV ARTHROPLASTY GLENOHUMERAL JOINT TOTAL SHOULDER Left 03/01/2022    Procedure: ARTHROPLASTY SHOULDER REVERSE;  Surgeon: Kat Lombardi MD;  Location: [facility];  Service: Orthopedics   • WV CYSTO BLADDER W/URETERAL CATHETERIZATION Left 06/29/2016    Procedure: CYSTOSCOPY RETROGRADE PYELOGRAM WITH INSERTION STENT URETERAL, left;  Surgeon: Shana Dejesus MD;  Location: [facility];  Service: Urology   • SHOULDER ARTHROTOMY Left      Social History     Substance and Sexual Activity   Alcohol Use Not Currently    Comment: rarely     Social History     Substance and Sexual Activity   Drug Use Never     Social History     Tobacco Use   Smoking Status Former   • Packs/day: 1.00   • Years: 20.00   • Total pack years: 20.00   • Types: Cigarettes   • Quit date: 7/8/1996   • Years since quittin.1   • Passive exposure: Past   Smokeless Tobacco Never     Family History:   Family History   Problem Relation Age of Onset   • Heart disease Mother    • Diabetes Father    • Thyroid cancer Sister    • Heart disease Brother    • Diabetes Brother    • Heart disease Brother    • Heart disease Brother    • Emphysema Maternal Grandmother    • Heart disease Family        Meds/Allergies   all current active meds have been reviewed, current meds:   Current Facility-Administered Medications   Medication Dose Route Frequency   • albuterol (PROVENTIL HFA,VENTOLIN HFA) inhaler 2 puff  2 puff Inhalation Q6H PRN   • amiodarone (CORDARONE) 900 mg in dextrose 5 % 500 mL infusion  0.5 mg/min Intravenous Continuous   • atorvastatin (LIPITOR) tablet 40 mg  40 mg Oral Daily With Dinner   • bisacodyl (DULCOLAX) rectal suppository 10 mg  10 mg Rectal Daily PRN   • cefepime (MAXIPIME) 2 g/50 mL dextrose IVPB  2,000 mg Intravenous Q12H   • chlorhexidine (PERIDEX) 0.12 % oral rinse 15 mL  15 mL Mouth/Throat Q12H [facility]   • EPINEPHrine 5,000 mcg (STANDARD CONCENTRATION) IV in sodium chloride 0.9% 250 mL  1-20 mcg/min Intravenous Titrated   • fluticasone (FLONASE) 50 mcg/act nasal spray 1 spray  1 spray Nasal Daily   • hydrocortisone (Solu-CORTEF) injection 50 mg  50 mg Intravenous Q6H [facility]   • insulin lispro (HumaLOG) 100 units/mL subcutaneous injection 2-12 Units  2-12 Units Subcutaneous Q6H [facility]   • metroNIDAZOLE (FLAGYL) IVPB (premix) 500 mg 100 mL  500 mg Intravenous Q8H   • micafungin (MYCAMINE) 100 mg in sodium chloride 0.9 % 100 mL IVPB  100 mg Intravenous Q24H   • montelukast (SINGULAIR) tablet 10 mg  10 mg Oral HS   • norepinephrine (LEVOPHED) 8 mg (DOUBLE CONCENTRATION) IV in sodium chloride 0.9% 250 mL  1-30 mcg/min Intravenous Titrated   • NxStage K 4/Ca 3 dialysis solution (RFP-401) 20,000 mL  20,000 mL Dialysis Continuous   • nystatin (MYCOSTATIN) powder   Topical BID   • pantoprazole (PROTONIX) EC tablet 40 mg  40 mg Oral Early Morning   • phenylephrine (CELINA-SYNEPHRINE) 100 mg (DOUBLE CONCENTRATION) IV in sodium chloride 0.9% 250 mL   mcg/min Intravenous Titrated   • polyethylene glycol (MIRALAX) packet 17 g  17 g Oral Daily   • propofol (DIPRIVAN) 1000 mg in 100 mL infusion (premix)  5-50 mcg/kg/min Intravenous Titrated   • senna (SENOKOT) tablet 17.2 mg  17.2 mg Oral HS   • sodium bicarbonate 150 mEq in dextrose 5 % 1,000 mL infusion  200 mL/hr Intravenous Continuous   • vancomycin (VANCOCIN) IVPB (premix in dextrose) 750 mg 150 mL  10 mg/kg (Adjusted) Intravenous Q12H   • vasopressin (PITRESSIN) 20 Units in sodium chloride 0.9 % 100 mL infusion  0.04 Units/min Intravenous Continuous    and PTA meds:   Prior to Admission Medications   Prescriptions Last Dose Informant Patient Reported? Taking? B Complex-C-Folic Acid (triphrocaps) 1 MG CAPS  Self No No   Sig: Take 1 capsule (1 mg total) by mouth daily   Fluticasone-Salmeterol (Wixela Inhub) 250-50 mcg/dose inhaler  Self No No   Sig: Inhale 1 puff 2 (two) times a day Rinse mouth after use. Insulin Pen Needle (BD Pen Needle Pilar 2nd Gen) 32G X 4 MM MISC  Self No No   Sig: For use with insulin pen. Pharmacy may dispense brand covered by insurance.    Insulin Pen Needle (NovoFine Autocover) 30G X 8 MM MISC  Self No No   Sig: Inject under the skin daily   Insulin Pen Needle 30G X 8 MM MISC  Self Yes No   Si (two) times a day   Insulin Pen Needle 31G X 8 MM MISC  Self No No   Sig: Use daily Inject under the skin   Lancets (onetouch ultrasoft) lancets  Self No No   Sig: Use once daily   NovoFine Autocover Pen Needle 30G X 8 MM MISC  Self No No   Sig: USE TWICE A DAY   Toujeo SoloStar 300 units/mL CONCENTRATED U-300 injection pen (1-unit dial)  Self No No   Sig: inject 25 units subcutaneously daily at bedtime   albuterol (PROVENTIL HFA,VENTOLIN HFA) 90 mcg/act inhaler  Self Yes No   Sig: Inhale 2 puffs every 6 (six) hours as needed for wheezing   ammonium lactate (LAC-HYDRIN) 12 % lotion  Self Yes No   Sig: APPLY TOPICALLY TO AFFECTED AREAS twice a day   apixaban (ELIQUIS) 5 mg  Self No No   Sig: Take 1 tablet (5 mg total) by mouth 2 (two) times a day   doxycycline (ADOXA) 100 MG tablet   No No   Sig: Take 1 tablet (100 mg total) by mouth 2 (two) times a day for 9 days   ferrous sulfate 325 (65 Fe) mg tablet   No No   Sig: Take 1 tablet (325 mg total) by mouth daily with breakfast Do not start before 2023. fluticasone (FLONASE) 50 mcg/act nasal spray  Self No No   Si spray into each nostril daily   glucose blood (OneTouch Verio) test strip  Self No No   Sig: Use 1 each 4 (four) times a day Use as instructed   insulin lispro (HumaLOG KwikPen) 100 units/mL injection pen  Self No No   Sig: Use 10 units prior to each meal +1 unit per 50 above 150 mg/dL   metoprolol tartrate (LOPRESSOR) 50 mg tablet   No No   Sig: Take 1 tablet (50 mg total) by mouth every 12 (twelve) hours   montelukast (SINGULAIR) 10 mg tablet  Self No No   Sig: take 1 tablet by mouth at bedtime   nystatin (MYCOSTATIN) powder  Self No No   Sig: Apply topically 2 (two) times a day   pantoprazole (PROTONIX) 40 mg tablet  Self No No   Sig: Take 1 tablet (40 mg total) by mouth daily   polyethylene glycol (MIRALAX) 17 g packet   No No   Sig: Take 17 g by mouth daily Do not start before 2023. rosuvastatin (CRESTOR) 10 MG tablet  Self No No   Sig: take 1 tablet by mouth once daily   senna (SENOKOT) 8.6 mg   No No   Sig: Take 2 tablets (17.2 mg total) by mouth daily at bedtime   sodium chloride, PF, 0.9 %   No No   Sig: 10 mL by Intracatheter route daily Intracatheter flushing daily. May substitute prefilled syringe with normal saline 10 mL vials, 10 mL syringes, and 18 g blunt needles   torsemide (DEMADEX) 10 mg tablet   No No   Sig: Take 1 tablet (10 mg total) by mouth daily Do not start before 2023.       Facility-Administered Medications: None     amiodarone (CORDARONE) 900 mg in dextrose 5 % 500 mL infusion, 0.5 mg/min, Last Rate: 0.5 mg/min (08/29/23 0125)  epinephrine, 1-20 mcg/min, Last Rate: 20 mcg/min (08/29/23 0523)  norepinephrine, 1-30 mcg/min, Last Rate: 30 mcg/min (08/29/23 0113)  NxStage K 4/Ca 3, 20,000 mL  phenylephine,  mcg/min, Last Rate: 155 mcg/min (08/29/23 0745)  propofol, 5-50 mcg/kg/min, Last Rate: Stopped (08/28/23 2230)  sodium bicarbonate 150 mEq in dextrose 5 % 1,000 mL infusion, 200 mL/hr, Last Rate: 200 mL/hr (08/29/23 0706)  vasopressin, 0.04 Units/min, Last Rate: 0.04 Units/min (08/28/23 2218)        Allergies   Allergen Reactions   • Penicillins Hives   • Moxifloxacin Other (See Comments)     unknown   • Oxycodone-Acetaminophen Other (See Comments)     GI upset   • Zinc Acetate Other (See Comments)     unknown   • Penicillins Hives   • Asa [Aspirin] GI Intolerance   • Indocin [Indomethacin] Other (See Comments)     Made patient "loopy"   • Other Other (See Comments)     unknown   • Tannic Acid Rash       Objective   Vitals: Blood pressure (!) 84/51, pulse (!) 122, temperature (!) 95.4 °F (35.2 °C), resp. rate (!) 28, height 5' 2" (1.575 m), weight 120 kg (264 lb), SpO2 94 %, not currently breastfeeding.,     Body mass index is 48.29 kg/m². ,     Systolic (31NIR), SAK:45 , Min:51 , GCM:837     Diastolic (99AZM), TYD:61, Min:30, Max:79    Wt Readings from Last 3 Encounters:   08/29/23 120 kg (264 lb)   08/22/23 120 kg (264 lb)   08/17/23 129 kg (284 lb 6.3 oz)      Lab Results   Component Value Date    CREATININE 5.25 (H) 08/29/2023    CREATININE 6.76 (H) 08/28/2023    CREATININE 7.38 (H) 08/28/2023             Intake/Output Summary (Last 24 hours) at 8/29/2023 0850  Last data filed at 8/29/2023 0700  Gross per 24 hour   Intake 5323.25 ml   Output 95 ml   Net 5228.25 ml     Weight (last 2 days)     Date/Time Weight    08/29/23 0135 120 (264)        Invasive Devices     Central Venous Catheter Line  Duration           CVC Central Lines 08/28/23 Triple Right Internal jugular <1 day          Peripheral Intravenous Line  Duration           Peripheral IV 08/28/23 Left;Proximal;Ventral (anterior) Forearm <1 day          Arterial Line  Duration           Arterial Line 08/29/23 Radial <1 day          Hemodialysis Catheter  Duration           HD Temporary Double Catheter <1 day          Drain  Duration           NG/OG/Enteral Tube Nasogastric 18 Fr Center mouth <1 day    Urethral Catheter Temperature probe 16 Fr. <1 day          Airway  Duration           ETT  Oral 7.5 mm <1 day                  Physical Exam  Vitals and nursing note reviewed. Constitutional:       General: She is not in acute distress. Appearance: She is well-developed. She is obese. She is ill-appearing. Comments: Intubated and sedated   HENT:      Head: Normocephalic and atraumatic. Neck:      Vascular: No JVD. Cardiovascular:      Rate and Rhythm: Normal rate and regular rhythm. Heart sounds: Normal heart sounds. No murmur heard. No friction rub. Pulmonary:      Effort: Pulmonary effort is normal. No respiratory distress. Breath sounds: Rhonchi present. No wheezing or rales. Abdominal:      General: Bowel sounds are normal. There is distension. Palpations: Abdomen is soft. Tenderness: There is no abdominal tenderness. Musculoskeletal:         General: No tenderness. Normal range of motion. Cervical back: Normal range of motion and neck supple. Right lower leg: Edema present. Left lower leg: Edema present. Skin:     General: Skin is warm and dry. Findings: No erythema. Neurological:      Mental Status: She is alert.       Comments: Sedated           LABORATORY RESULTS:      CBC with diff:   Results from last 7 days   Lab Units 08/29/23  0451 08/29/23  0034 08/28/23  2219 08/28/23  1613   WBC Thousand/uL 39.52*  --  17.14* 17.85*   HEMOGLOBIN g/dL 9.0*  --  8.9* 9.5*   I STAT HEMOGLOBIN g/dl  --  8.8*  --   --    HEMATOCRIT % 31.0*  -- 30.3* 31.2*   HEMATOCRIT, ISTAT %  --  26*  --   --    MCV fL 98  --  97 95   PLATELETS Thousands/uL 146*  --  586* 680*   RBC Million/uL 3.17*  --  3.11* 3.30*   MCH pg 28.4  --  28.6 28.8   MCHC g/dL 29.0*  --  29.4* 30.4*   RDW % 17.1*  --  17.0* 16.9*   MPV fL 10.4  --  10.6 10.7   NRBC AUTO /100 WBCs  --   --   --  0       CMP:  Results from last 7 days   Lab Units 08/29/23 0304 08/29/23 0034 08/28/23 2219 08/28/23  1613   POTASSIUM mmol/L 4.9  --  7.4* 7.4*   CHLORIDE mmol/L 103  --  100 98   CO2 mmol/L 15*  --  13* 21   CO2, I-STAT mmol/L  --  16*  --   --    BUN mg/dL 69*  --  84* 91*   CREATININE mg/dL 5.25*  --  6.76* 7.38*   GLUCOSE, ISTAT mg/dl  --  79  --   --    CALCIUM mg/dL 8.7  --  8.3* 9.0   AST U/L  --   --   --  75*   ALT U/L  --   --   --  64*   ALK PHOS U/L  --   --   --  142*   EGFR ml/min/1.73sq m 7  --  5 5       BMP:  Results from last 7 days   Lab Units 08/29/23 0304 08/29/23 0034 08/28/23 2219 08/28/23  1613   POTASSIUM mmol/L 4.9  --  7.4* 7.4*   CHLORIDE mmol/L 103  --  100 98   CO2 mmol/L 15*  --  13* 21   CO2, I-STAT mmol/L  --  16*  --   --    BUN mg/dL 69*  --  84* 91*   CREATININE mg/dL 5.25*  --  6.76* 7.38*   GLUCOSE, ISTAT mg/dl  --  79  --   --    CALCIUM mg/dL 8.7  --  8.3* 9.0          Lab Results   Component Value Date    NTBNP 71 (H) 10/31/2022    NTBNP 1,859 (H) 05/31/2022    NTBNP 4,994 (H) 05/17/2022            Results from last 7 days   Lab Units 08/29/23  0451 08/29/23  0304 08/28/23  2219   MAGNESIUM mg/dL 2.5 2.5 2.3                     Results from last 7 days   Lab Units 08/28/23  1749   INR  3.37*     Lipid Profile:   No results found for: "CHOL"  Lab Results   Component Value Date    HDL 22 (L) 08/18/2023    HDL 42 (L) 12/20/2022    HDL 35 (L) 07/20/2022     Lab Results   Component Value Date    LDLCALC 37 08/18/2023    LDLCALC 61 12/20/2022    LDLCALC 31 07/20/2022     Lab Results   Component Value Date    TRIG 107 08/18/2023    TRIG 259 (H) 12/20/2022 TRIG 376 (H) 2022         Cardiac testing:   Results for orders placed during the hospital encounter of 19    Echo complete with contrast if indicated    Narrative  [address] [facility].  [place] [address] [address]  [phone]    Transthoracic Echocardiogram  2D, M-mode, Doppler, and Color Doppler    Study date:  29-May-2019    Patient: Zoie White  MR number: [id number]  Account number: [de-identified]  : 1950  Age: 79 years  Gender: Female  Status: Inpatient  Location: Bedside  Height: 62 in  Weight: 250.6 lb  BP: 133/ 62 mmHg    Indications: Tachycardia    Diagnoses: I11.9 - Hypertensive heart disease without heart failure    Sonographer:  QUETA Fernando  Referring Physician:  Jenny White MD  Group:  [facility]  Interpreting Physician:  Som Tapia DO    SUMMARY    LEFT VENTRICLE:  Systolic function was normal by visual assessment. Ejection fraction was estimated to be 55 %. There were no regional wall motion abnormalities. Wall thickness was mildly to moderately increased. Doppler parameters were consistent with abnormal left ventricular relaxation (grade 1 diastolic dysfunction). MITRAL VALVE:  There was mild regurgitation. AORTIC VALVE:  There was no evidence for stenosis. There was no regurgitation. TRICUSPID VALVE:  There was mild regurgitation. Pulmonary artery systolic pressure was within the normal range. HISTORY: PRIOR HISTORY: Diabetes,Diabetes, HTN, Hyperlipidemia, A.Fib. PROCEDURE: The procedure was performed at the bedside. This was a routine study. The transthoracic approach was used. The study included complete 2D imaging, M-mode, complete spectral Doppler, and color Doppler. The heart rate was 77 bpm,  at the start of the study. Images were obtained from the parasternal, apical, subcostal, and suprasternal notch acoustic windows.  Echocardiographic views were limited due to restricted patient mobility, poor patient compliance, poor  acoustic window availability, decreased penetration, and lung interference. This was a technically difficult study. LEFT VENTRICLE: Size was normal. Systolic function was normal by visual assessment. Ejection fraction was estimated to be 55 %. There were no regional wall motion abnormalities. Wall thickness was mildly to moderately increased. DOPPLER:  Doppler parameters were consistent with abnormal left ventricular relaxation (grade 1 diastolic dysfunction). RIGHT VENTRICLE: The size was normal. Systolic function was normal.    LEFT ATRIUM: The atrium was mildly dilated. RIGHT ATRIUM: Size was normal.    MITRAL VALVE: Valve structure was normal. There was normal leaflet separation. No echocardiographic evidence for prolapse. DOPPLER: The transmitral velocity was within the normal range. There was no evidence for stenosis. There was mild  regurgitation. AORTIC VALVE: The valve was trileaflet. Leaflets exhibited normal thickness, normal cuspal separation, and sclerosis. DOPPLER: Transaortic velocity was within the normal range. There was no evidence for stenosis. There was no  regurgitation. TRICUSPID VALVE: The valve structure was normal. There was normal leaflet separation. DOPPLER: The transtricuspid velocity was within the normal range. There was mild regurgitation. Pulmonary artery systolic pressure was within the normal  range. Estimated peak PA pressure was 32 mmHg. PULMONIC VALVE: Leaflets exhibited normal thickness, no calcification, and normal cuspal separation. DOPPLER: The transpulmonic velocity was within the normal range. There was no regurgitation. PERICARDIUM: There was no thickening. There was no pericardial effusion. AORTA: The root exhibited normal size.     PULMONARY ARTERY: The size was normal. The morphology appeared normal.    SYSTEM MEASUREMENT TABLES    2D mode  AoR Diam 2D: 3.6 cm  LA Diam (2D): 3.9 cm  LA/Ao (2D): 1.08  FS (2D Teich): 26.9 %  IVSd (2D): 1.29 cm  LVDEV: 75.1 cmï¾³  LVESV: 35.3 cmï¾³  LVIDd(2D): 4.12 cm  LVISd (2D): 3.01 cm  LVOT Area 2D: 3.14 cmï¾²  LVPWd (2D): 1.2 cm  SV (Teich): 39.8 cmï¾³    Apical four chamber  LVEF A4C: 51 %    Unspecified Scan Mode  HANNA Cont Eq (Peak Gutierrez): 2.58 cmï¾²  LVOT Diam.: 2 cm  LVOT Vmax: 963 mm/s  LVOT Vmax; Mean: 963 mm/s  Peak Grad.; Mean: 4 mm[Hg]  MV Peak A Gutierrez: 893 mm/s  MV Peak E Gutierrez. Mean: 805 mm/s  MVA (PHT): 3.67 cmï¾²  PHT: 60 ms  Max P mm[Hg]  V Max: 2360 mm/s  Vmax: 2430 mm/s  RA Area: 12.8 cmï¾²  RA Volume: 27.6 cmï¾³  TAPSE: 2 cm    IntersBradley [facility] Commission Accredited Echocardiography Laboratory    Prepared and electronically signed by    Eduin Caputo DO  Signed 29-May-2019 16:19:07    No results found for this or any previous visit. No valid procedures specified. No results found for this or any previous visit. Imaging: I have personally reviewed pertinent reports. XR chest 1 view portable    Result Date: 2023  Narrative: CHEST INDICATION:   post intubation and ogt. COMPARISON: Chest radiograph 2023 at 18:20 EXAM PERFORMED/VIEWS:  XR CHEST PORTABLE FINDINGS: Tip of endotracheal tube is 0.4 cm above the beatrice and projects over the proximal right mainstem bronchus. Distal end of enteric tube is beneath diaphragm; tip is excluded from field-of-view. Tip of right internal jugular central venous catheter projects over the superior cavoatrial junction. Heart shadow is enlarged but unchanged from prior exam. New hazy bilateral opacities. Mild blunting of the costophrenic angles is similar to prior study. No pneumothorax. Osseous structures appear within normal limits for patient age. Impression: Low positioning of the endotracheal tube. Retraction by approximately 2 cm is advised. New hazy bilateral opacities may represent edema. Probable small bilateral pleural effusions are similar to prior study.  The study was marked in [facility] for immediate notification. Workstation performed: [id number]     XR chest 1 view portable    Result Date: 8/29/2023  Narrative: CHEST INDICATION:   sob. COMPARISON: Chest radiograph August 28, 2023 at 15:31 EXAM PERFORMED/VIEWS:  XR CHEST PORTABLE FINDINGS: Tip of right internal jugular central venous catheter projects over the superior cavoatrial junction. Heart shadow is enlarged but unchanged from prior exam. Significant decrease in left-sided opacities. Mild blunting of the costophrenic angles. No pneumothorax. Partially imaged left shoulder arthroplasty. Impression: Pulmonary edema has significantly improved with probable small residual bilateral pleural effusions. Workstation performed: [id number]     XR chest 1 view portable    Result Date: 8/28/2023  Narrative: CHEST INDICATION:   sob. COMPARISON: CXR and chest CT 8/16/2023. EXAM PERFORMED/VIEWS:  XR CHEST PORTABLE. FINDINGS: Cardiomediastinal silhouette normal. Increase in left effusion, moderate, with left base atelectasis. Persistent trace right effusion. No pneumothorax. Upper abdomen normal. Bones normal for age. Reverse left shoulder arthroplasty. Impression: Increase in left effusion, moderate, with moderate left base atelectasis. Pneumonia not excluded in the appropriate clinical setting. Persistent trace right effusion. Workstation performed: [id number]     IR drainage tube check/change/reposition/reinsertion/upsize    Result Date: 8/28/2023  Narrative: Drain check using ultrasound followed by drain removal Clinical History: A41.9: Sepsis, unspecified organism Contrast: None Fluoro time: n/a Number of Images: 7 Radiation Dose: 0 mGy Conscious sedation time: None Procedure: The patient was brought to the interventional radiology suite, identified. The patient was placed supine on the table. The previous drainage catheter site was prepped and draped in usual sterile fashion. An ultrasound was performed with minimal residual fluid.  The catheter was slowly pulled back during aspiration and no fluid could be withdrawn. Therefore, the catheter was removed since there was only 5 to 10 cc/day of drainage at this point. Impression: Impression: 1. Drain check demonstrated minimal residual fluid collection and therefore the catheter was removed. Workstation performed: [id number]     IR drainage tube check/change/reposition/reinsertion/upsize    Result Date: 8/21/2023  Narrative: Ultrasound guided abscess drainage tube replacement and repositioning Clinical History: A41.9: Sepsis, unspecified organism. Right breast abscess drain placed with postop bleeding around the catheter likely due to the patient being on Eliquis. Fluoro time: n/a Number of Images: 3 Conscious sedation time: 15 min Technique: The patient was brought to the interventional radiology suite and identified verbally and by wristband. The patient was placed supine on her bed in the ICU. After review of the patient's prior imaging studies, the skin over the abscess site drainage tube was prepped and draped in usual sterile fashion. Lidocaine was administered to the skin. A small bleeding vessel was identified just beneath the skin which was successfully cauterized. The catheter was removed and upsized to a 12 [place] catheter over the wire. An ultrasound demonstrated the drainage catheter within the collection. The drainage tube was resutured to the skin and placed to ELIESER bulb suction. The drainage tube tract was injected with D-Stat thrombin gel. Impression: Impression: Successful right breast abscess drain replacement and upsize to a 12 [place] drain after the skin tract was cauterized and injected with D-Stat thrombin gel due to a small superficial bleeding vessel just beneath the skin. PLAN: Return in 2 weeks for a tube check. Tube to ELIESER bulb suction and flush once a day with 10 cc normal saline solution. May resume Eliquis in 3 days if there is no further bleeding from the tube tract.  Workstation performed: [id number]     IR drainage tube placement    Result Date: 8/18/2023  Narrative: ultrasound guided abscess drainage Clinical History: Large right breast fluid collection and sepsis Fluoro time: n/a Number of Images: 8 Conscious sedation time: n/a Technique: The patient was brought to the interventional radiology suite and identified verbally and by wristband. The patient was placed supine on her bed. After review of the patient's prior imaging studies, the skin over the abscess site was prepped and draped in usual sterile fashion. Lidocaine was administered to the skin and a small skin incision was made. Under direct ultrasound guidance, a 19 gauge hollow bore needle was advanced into the abscess. Small amount of fluid was aspirated and sent for culture, sensitivity, and gram stain. A heavy-duty wire was coiled within the collection, and after tract dilatation, a 10 [place] all-purpose drainage catheter was inserted over the wire. The catheter was sutured in place, sterilely dressed, and connected to ELIESER bulb suction. 160 cc of purulent fluid was drained from the collection. Impression: Impression: Successful placement of a 10 [place] catheter into a right breast abscess under ultrasound control, and 160 mL of tan purulent fluid was aspirated and a specimen sent to the lab as described above. PLAN: Tube to ELIESER bulb suction. Flush with 10 cc of normal saline solution q. day. Return in 2 weeks for tube check. Workstation performed: [id number]     MRA carotids wo and w contrast    Result Date: 8/17/2023  Narrative: MRA NECK - WITH AND WITHOUT CONTRAST INDICATION: stroke COMPARISON: CT head 8/17/2023 TECHNIQUE:  Gadolinium enhanced and noncontrast examination with 3-D reconstruction. IV Contrast:  20 mL of Gadoterate Meglumine SOLN FINDINGS: IMAGE QUALITY:  Diagnostic. AORTIC ARCH:  Normal. VISUALIZED SUBCLAVIAN ARTERIES:  The subclavian arteries demonstrate normal enhancement with no stenosis. VERTEBRAL ARTERIES:  Normal vertebral artery origins. The vertebral arteries are bilaterally symmetric with normal enhancement and no evidence of stenosis. Normal vertebral basilar junction. RIGHT CAROTID ARTERY: Normal common carotid artery, cervical internal carotid artery and external carotid artery. No stenosis at the bifurcation. LEFT CAROTID ARTERY:  Normal common carotid artery, cervical internal carotid artery and external carotid artery. No stenosis at the bifurcation. VISUALIZED INTRACRANIAL VASCULATURE: Please refer to the dedicated cerebral MRA examination. NASCET criteria was used to determine the degree of internal carotid artery diameter stenosis. Impression: There is no evidence for a hemodynamically significant extracranial carotid stenosis. The cervical vertebral arteries are patent. Workstation performed: [id number]     MRA head wo contrast    Result Date: 8/17/2023  Narrative: MRA BRAIN WITHOUT CONTRAST INDICATION:  stroke COMPARISON: CT head 8/17/2023 TECHNIQUE:  Axial 3-D time-of-flight imaging with 3-D reconstructions performed without contrast. IV Contrast:  Not administered. FINDINGS: IMAGE QUALITY:  Diagnostic. ANATOMY INTERNAL CAROTID ARTERIES:  Normal flow related signal of the distal cervical, petrous and cavernous segments of the internal carotid arteries. Normal ICA terminus. ANTERIOR CIRCULATION:  Normal A1 segments. Normal anterior communicating artery. Normal flow-related signal of the anterior cerebral arteries. MIDDLE CEREBRAL ARTERY CIRCULATION:  The M1 segment and middle cerebral artery branches demonstrate normal flow-related signal. DISTAL VERTEBRAL ARTERIES:  Distal vertebral arteries are patent with a normal vertebrobasilar junction. The posterior inferior cerebellar artery origins are normal. BASILAR ARTERY:  Normal. POSTERIOR CEREBRAL ARTERIES: Partial for supply of the right posterior cerebral artery. Both arteries demonstrate normal flow-related enhancement. Patent posterior communicating arteries. Impression: Cerebral MRA demonstrates no large vessel intracranial occlusion or focal stenosis. Workstation performed: [id number]     MRI brain w wo contrast    Result Date: 8/17/2023  Narrative: MRI BRAIN WITH AND WITHOUT CONTRAST INDICATION: Stroke. COMPARISON:  None. TECHNIQUE: Multiplanar, multisequence imaging of the brain was performed before and after gadolinium administration. IV Contrast:  20 mL of Gadoterate Meglumine SOLN IMAGE QUALITY:   Motion degraded examination. FINDINGS: BRAIN PARENCHYMA:  There is no discrete mass, mass effect or midline shift. There is no intracranial hemorrhage. Normal posterior fossa. Diffusion imaging is unremarkable. Small scattered hyperintensities on T2/FLAIR imaging are noted in the periventricular and subcortical white matter demonstrating an appearance that is statistically most likely to represent mild microangiopathic change. Postcontrast imaging of the brain demonstrates no abnormal enhancement. VENTRICLES:  Ventricles and extra-axial CSF spaces are prominent commensurate with the degree of volume loss. No hydrocephalus. No intraventricular hemorrhage. SELLA AND PITUITARY GLAND:  Normal. ORBITS:  Normal. PARANASAL SINUSES: Mild ethmoidal mucosal thickening. Trace left mastoid fluid is noted. VASCULATURE:  Evaluation of the major intracranial vasculature demonstrates appropriate flow voids. CALVARIUM AND SKULL BASE: Marrow signal heterogeneity which can be seen with underlying red marrow proliferation. EXTRACRANIAL SOFT TISSUES:  Normal.     Impression: Motion degraded examination. 1. No acute infarction, edema, or pathologic intra-axial enhancement. 2. Mild chronic microangiopathic ischemic changes. Workstation performed: [id number]     XR chest portable    Result Date: 8/17/2023  Narrative: CHEST INDICATION:   increased work of breathing. COMPARISON: Chest radiograph dated 8/11/2023.  EXAM PERFORMED/VIEWS:  XR CHEST PORTABLE FINDINGS: Cardiomediastinal silhouette appears unremarkable. No focal airspace consolidation. Trace bilateral pleural effusions with subjacent atelectasis. No pneumothorax. Partially included left humeral prosthesis. Impression: Trace bilateral pleural effusions with subjacent atelectasis. Workstation performed: [id number]     EEG awake or drowsy routine    Result Date: 2023  Narrative: Table formatting from the original result was not included. Routine EEG Patient Name:  Rina Gamez  MRN: [id number] :  1950 File #: [id number] Age: 72 y.o. Ordering Provider: RK Sandoval  Study Start-End: 2023 [id number]             Study type: Routine EEG ICD 10 diagnosis: Encephalopathy, unspecified G93.40 ------------------------------------------------- Patient History: Rina Gamez is a 72 y.o. female with a PMH of afib on [facility], admitted for septick shock with an episode concerning for seizure. EEG ordered to evaluate for encephalopathy. Relevant medications include: No AEDs Sedation: No Intubated: No Paralyzed: No ------------------------------------------------- 32 channel digital recording with electrodes placed according to the International 10-20 system with continuous video, ECG, EOG and the possible addition of T1/T2 electrodes. This study was monitored by a monitoring technologist.  The physician interpreting the study had access to the data throughout the recording. The recording was technically satisfactory. ------------------------------------------------- Description: Background: During waking, there were poorly formed anterior-posterior voltage and frequency gradients. There was no well formed posterior dominant rhythm. The predominant awake background activity was generalized irregular 3-5 Hz mixed delta/theta Sleep: The patient did not sleep during this recording Reactivity: The background was reactive to external stimuli.  Photic stimulation did not provoke photic driving. Hyperventilation was deferred. Interictal abnormalities: 1. Frequent generalized triphasic waves with alternating midline (Fz vs. Pz vs. Cz) maximal polarity Rhythmic/Periodic patterns: None Seizures: None Events: No push buttons were activated. Other findings: Samples of the single channel ECG demonstrated an irregular rhythm ------------------------------------------------- EEG impression: This is an abnormal routine EEG recording due to: 1. Generalized triphasic waves 2. Generalized irregular delta/theta 3. Absent PDR Clinical Interpretation: This abnormal study is consistent with a moderate generalized non-specific encephalopathy. No electrographic seizures, interictal epileptiform discharges (seizure tendency) or focal slowing were seen. No diagnostic clinical events were captured. Alexia Harper MD [facility]      Echo complete w/ contrast if indicated    Result Date: 8/17/2023  Narrative: •  Left Ventricle: Left ventricular cavity size is normal. Wall thickness is mildly increased. There is mild concentric hypertrophy. The left ventricular ejection fraction is 55 or 60 % by visual estimation. . Systolic function is normal. Wall motion is normal. Diastolic function is normal for age. •  Right Ventricle: Right ventricular cavity size is upper normal. •  Mitral Valve: There is trace to mild regurgitation. •  Pericardium: There is a small pericardial effusion circumferential to the heart. The fluid exhibits no internal echoes. No evidence of any tamponade. •  As compared to recent study 8/15/2023, no significant change. CT stroke alert brain    Result Date: 8/17/2023  Narrative: CT BRAIN - STROKE ALERT PROTOCOL INDICATION:   Neuro deficit, acute, stroke suspected Stroke Alert. COMPARISON: January 22, 2023 TECHNIQUE:  CT examination of the brain was performed.   In addition to axial images, coronal reformatted images were created and submitted for interpretation. Radiation dose length product (DLP) for this visit:  895 mGy-cm . This examination, like all CT scans performed in the [facility], was performed utilizing techniques to minimize radiation dose exposure, including the use of iterative reconstruction and automated exposure control. IMAGE QUALITY:  Diagnostic. FINDINGS: PARENCHYMA: Decreased attenuation is noted in periventricular and subcortical white matter demonstrating an appearance that is statistically most likely to represent mild microangiopathic change; this appearance is similar when compared to most recent prior examination. No CT signs of acute infarction. No intracranial mass, mass effect or midline shift. No acute parenchymal hemorrhage. Normal intracranial vasculature. VENTRICLES AND EXTRA-AXIAL SPACES:  Normal for the patient's age. VISUALIZED ORBITS: Normal visualized orbits. PARANASAL SINUSES: Normal visualized paranasal sinuses. CALVARIUM AND EXTRACRANIAL SOFT TISSUES:   Normal.     Impression: No acute intracranial abnormality. Chronic microangiopathic changes. Findings were directly discussed with Prudy Grew  at 8:37 a.m. on 8/17/2023. Workstation performed: [id number]     CT chest abdomen pelvis wo contrast    Result Date: 8/17/2023  Narrative: CT CHEST, ABDOMEN AND PELVIS WITHOUT IV CONTRAST INDICATION:   Sepsis sepsis. COMPARISON: CT chest 8/14/2023 and CT chest abdomen and pelvis 3/26/2023 TECHNIQUE: CT examination of the chest, abdomen and pelvis was performed without intravenous contrast. Multiplanar 2D reformatted images were created from the source data. This examination, like all CT scans performed in the [facility], was performed utilizing techniques to minimize radiation dose exposure, including the use of iterative reconstruction and automated exposure control. Radiation dose length product (DLP) for this visit:  2530.33 mGy-cm Enteric contrast was not administered.  FINDINGS: CHEST Mild respiratory motion artifact. LUNGS: Minimal bilateral lower lobe subsegmental atelectasis, slightly increased. Scattered pulmonary nodules better visualized on the prior study. Few representative nodules are marked on series 6. Central airways are clear. PLEURA: New trace bilateral pleural effusions. HEART/GREAT VESSELS: Heart is not enlarged. New small pericardial effusion. Aortic and coronary artery calcification. No thoracic aortic aneurysm. Aberrant right subclavian artery, normal variant MEDIASTINUM AND LUIGI: Stable mildly prominent subcentimeter short axis right subcarinal lymph nodes. CHEST WALL AND LOWER NECK: Partially visualized right breast fluid collection measuring at least 11 cm. ABDOMEN Minimal motion artifact. LIVER/BILIARY TREE: Hepatomegaly measuring approximately 20 cm craniocaudal. Unenhanced liver otherwise unremarkable. No biliary dilation. GALLBLADDER: Minimal hyperdensity in the dependent gallbladder may represent sludge or layering gravel-like gallstones. No pericholecystic inflammatory changes. SPLEEN:  Unremarkable. PANCREAS: Stable severe atrophy of the pancreas. ADRENAL GLANDS:  Unremarkable. KIDNEYS/URETERS:  Unremarkable. No hydronephrosis. STOMACH AND BOWEL: Colonic diverticulosis without diverticulitis. Minimal hyperdensity in the distal small bowel and colon presumably medicinal; patient was not given enteric contrast for the study. No bowel obstruction. APPENDIX: Prior appendectomy per history in epic. ABDOMINOPELVIC CAVITY: No ascites. No pneumoperitoneum. No lymphadenopathy. VESSELS: Aortoiliac calcification. No aneurysm. PELVIS REPRODUCTIVE ORGANS:  Unremarkable for patient's age. URINARY BLADDER:  Unremarkable. ABDOMINAL WALL/INGUINAL REGIONS: Diastases recti and small fat-containing umbilical hernia. Trace body wall edema. OSSEOUS STRUCTURES: No acute fracture or osseous destructive lesion identified.   Degenerative changes of the spine and multiple joints mild levoconvex thoracic scoliosis. Evidence of prior left shoulder surgery. Impression: CT chest: New trace bilateral pleural effusions and small pericardial effusion. Findings may be sequela of sepsis, third spacing, or pulmonary vascular congestion congestion. Trace bilateral lower lobe dependent subsegmental atelectasis slightly increased likely due to new pleural effusions. No airspace disease to suggest pneumonia. Incidental partially visualized right breast loculated fluid collection measuring at least 11 cm. Given history of right breast surgery, this may represent chronic seroma or hematoma. Additional chronic findings and negatives as above. CT abdomen and pelvis: No evidence of acute abdominopelvic process. Colonic diverticulosis. Additional chronic findings and negatives as above. This study demonstrates a significant  finding and was documented as such in [facility] for liaison and referring practitioner notification. Workstation performed: [id number]     US kidney and bladder    Result Date: 8/15/2023  Narrative: RENAL ULTRASOUND INDICATION:  SEBASTIAN. COMPARISON: Renal ultrasound 3/15/2022, CT chest, abdomen and pelvis 3/26/2023 TECHNIQUE:   Ultrasound of the retroperitoneum was performed with a curvilinear transducer utilizing volumetric sweeps and still imaging techniques. FINDINGS: KIDNEYS: Symmetric and normal size. Right kidney:  9.7 x 5.1 x 4.4 cm. Volume 115.3 mL Left kidney:  10.2 x 6.0 x 4.5 cm. Volume 144.8 mL Right kidney Normal echogenicity and contour. No mass is identified. No hydronephrosis. No shadowing calculi. No perinephric fluid collections. Left kidney Normal echogenicity and contour. No mass is identified. No hydronephrosis. No shadowing calculi. No perinephric fluid collections. URETERS: Nonvisualized. BLADDER: Suboptimally distended. No focal thickening or mass lesions. Neither ureteral jet is detected. Impression: Unremarkable kidneys without evidence of hydronephrosis.  Workstation performed: [id number]     Echo complete w/ contrast if indicated    Result Date: 8/15/2023  Narrative: •  Left Ventricle: Left ventricle is not well visualized. Left ventricular cavity size is normal. Wall thickness is normal. The left ventricular ejection fraction is 55% by visual estimation. . Systolic function is normal. Wall motion is normal. Diastolic function is normal. •  Mitral Valve: There is trace regurgitation. •  This is a very limited study due to body habitus and linear echoprobe. No significant change from previous echo. CT chest wo contrast    Result Date: 8/14/2023  Narrative: CT CHEST WITHOUT IV CONTRAST INDICATION:   Pneumonia, unresolved pneumonia, fever. COMPARISON: Chest radiograph 8/11/2023, chest CT 4/28/2023, 3/26/2023, baseline chest CT 3/9/2022. TECHNIQUE: Chest CT without intravenous contrast.  Axial, sagittal, coronal 2D reformats and coronal MIPS from source data. Radiation dose length product (DLP):  722 mGy-cm . Radiation dose exposure minimized using iterative reconstruction and automated exposure control. FINDINGS: LUNGS: No focal pneumonia. Mild septal thickening suggestive of interstitial edema. Dependent atelectasis in the lower lobes. Several subcentimeter nodules, marked on series 3, likely stable since March 2022 but that study was compromised by motion. AIRWAYS: No significant filling defects. PLEURA:  Unremarkable. HEART/GREAT VESSELS: Normal heart size. Mild coronary artery calcification indicating atherosclerotic heart disease. Aberrant right subclavian artery coursing posterior to the esophagus, a normal variant. MEDIASTINUM AND LUIGI:  Unremarkable. CHEST WALL AND LOWER NECK: Unremarkable. UPPER ABDOMEN:  Unremarkable. OSSEOUS STRUCTURES: Mild degenerative disease in the spine. Reverse left shoulder arthroplasty. Impression: No definite pneumonia. Mild septal thickening suggestive of interstitial edema. Dependent atelectasis in the lower lobes.  Several subcentimeter nodules, stable since March 2023, likely stable since March 2022 although that exam is compromised by motion. Workstation performed: [id number]     XR chest portable    Result Date: 8/11/2023  Narrative: CHEST INDICATION:   SOB. COMPARISON: 4/28/2023 EXAM PERFORMED/VIEWS:  XR CHEST PORTABLE FINDINGS: Cardiomediastinal silhouette appears unremarkable. Right base airspace opacity. Replacement no pneumothorax or pleural effusion. Osseous structures appear within normal limits for patient age. Impression: Right base infiltrate and/or atelectasis Workstation performed: [id number]           Counseling / Coordination of Care  Total floor / unit time spent today 45 minutes. Greater than 50% of total time was spent with the patient and / or family counseling and / or coordination of care. A description of the counseling / coordination of care: Review of history, current assessment, development of a plan. Code Status: Level 1 - Full Code    ** Please Note: Dragon 360 Dictation voice to text software may have been used in the creation of this document.  **

## 2023-08-29 NOTE — ASSESSMENT & PLAN NOTE
· Hx of KATRINA and obesity hypoventilation syndrome; pt wears oxygen and CPAP qHS at home. No oxygen requirements during the day  · 8/28/2023: Intubated in the ED for airway protection due to vomiting while on BiPAP  · Continue AC/VC, maintain oxygen saturation >/= 92%.   · Keep head of bed elevated to prevent hypoxia from obesity hypoventilation syndrome   · Ventilator management protocol  · Wean as tolerated  · NPO while intubated

## 2023-08-29 NOTE — OCCUPATIONAL THERAPY NOTE
Occupational Therapy Cancelled Session    Patient Name: Terry BECKMAN Date: 8/29/2023 08/29/23 1015   Note Type   Note type Cancelled Session   Cancel Reasons Intubated/sedated   Additional Comments OT orders received and chart review performed. Pt admitted w/ septic shock. Per ICU mobility rounds, pt not appropriate for participation in therapy today. Will hold and follow as appropriate.      GHANSHYAM Barron, OTR/L  [place] License [id number]  [address] [place] [address] [phone]

## 2023-08-29 NOTE — CONSULTS
Consultation -[facility] 72 y.o. female MRN: [id number]  Unit/Bed#: ICU 01 Encounter: [id number]        Inpatient consult to Acute Care Surgery  Consult performed by: Jose Kumar DO  Consult ordered by: Chelsey Torrez, [address] [place] [address]          Reason for Consult / Principal Problem: Septic shock [facility])     Examined approximately 830 this morning    HPI: Nany Kaur is a 72y.o. year old female who presents with Septic shock ([address] Patient presently intubated on multiple pressors. Epic history was reviewed. I discussed with the critical care AP as well. 65-year-old female with significant past medical history including type 2 diabetes, A-fib on Eliquis, recent right breast abscess status post IR drainage and subsequent removal, right lower lobe pneumonia, morbid obesity, chronic renal insufficiency, hypertension, hyperlipidemia, chronic heart failure brought in by EMS to the emergency room from [facility] with chief complaint of increasing shortness of breath and work of breathing per staff. She met septic parameters requiring intubation. Informed that she underwent a PEA arrest but had return of vital signs. Remains unresponsive on a ventilator on multiple pressors. There was concerns about her gallbladder and surgery was asked to comment. Although no report is noted, CTA did reveal bilateral pleural effusions as well as a pericardial effusion. Per reports in epic this does not appear to be a tamponade effect. Ultrasound was performed revealing hepatic steatosis hepatomegaly. Gallbladder wall is felt to be secondary to hepatic dysfunction. No gallstones were noted. I did review the abdominal images of the CTA and cannot appreciate any pneumatosis nor any thickened bowel wall. Recent PT was immeasurable at greater than 120. Lactic acid is elevated at 17. Initial presentation her creatinine was 7.4. She has been on CVVH and this is now down to 4.5.   White count 35,000.   Remains acidotic with a pH of 7.15 and a base deficit of 15      Review of Systems    Historical Information   Past Medical History:   Diagnosis Date   • SEBASTIAN (acute kidney injury) ([address]) 01/23/2023   • Anemia    • Asthma    • Atrial fibrillation ([address])    • Chronic kidney disease    • COVID-19 January 2022   • Diabetes mellitus ([address])    • GERD (gastroesophageal reflux disease)    • Hyperlipidemia    • Hypertension    • PONV (postoperative nausea and vomiting)    • Sleep apnea     wear BIPAP   • SVT (supraventricular tachycardia) ([address])      Past Surgical History:   Procedure Laterality Date   • APPENDECTOMY     • BREAST BIOPSY Right     years ago when she was 23   • BREAST BIOPSY Right 03/13/2023   • BREAST LUMPECTOMY Right 5/30/2023    Procedure: RIGHT BREAST KRIS  DIRECTED LUMPECTOMY - Jamari Philippe;  Surgeon: Roberto Persaud MD;  Location: [facility];  Service: Surgical Oncology   • CYSTOSCOPY W/ LASER LITHOTRIPSY Left 07/12/2016    Procedure: CYSTOSCOPY URETEROSCOPY WITH LITHOTRIPSY HOLMIUM LASER, RETROGRADE PYELOGRAM AND INSERTION STENT URETERAL;  Surgeon: Rahul Acosta MD;  Location: [facility];  Service:    • DILATION AND CURETTAGE OF UTERUS     • IR DRAINAGE TUBE CHECK/CHANGE/REPOSITION/REINSERTION/UPSIZE  8/18/2023   • IR DRAINAGE TUBE CHECK/CHANGE/REPOSITION/REINSERTION/UPSIZE  8/28/2023   • IR DRAINAGE TUBE PLACEMENT  8/18/2023   • IR THORACENTESIS  03/18/2022   • JOINT REPLACEMENT      right knee   • KNEE ARTHROPLASTY Right    • MAMMO NEEDLE LOCALIZATION LEFT (ALL INC) EACH ADD Right 4/24/2023   • MAMMO STEREOTACTIC BREAST BIOPSY RIGHT (ALL INC) Right 03/13/2023    High Risk Lesion   • AZ ARTHROPLASTY GLENOHUMERAL JOINT TOTAL SHOULDER Left 03/01/2022    Procedure: ARTHROPLASTY SHOULDER REVERSE;  Surgeon: Bean Calero MD;  Location: [facility];  Service: Orthopedics   • AZ CYSTO BLADDER W/URETERAL CATHETERIZATION Left 06/29/2016    Procedure: Alyssia Bennett RETROGRADE PYELOGRAM WITH INSERTION STENT URETERAL, left;  Surgeon: Clemencia Rosario MD;  Location: [facility];  Service: Urology   • SHOULDER ARTHROTOMY Left      Social History   Social History     Substance and Sexual Activity   Alcohol Use Not Currently    Comment: rarely     Social History     Substance and Sexual Activity   Drug Use Never     Social History     Tobacco Use   Smoking Status Former   • Packs/day: 1.00   • Years: 20.00   • Total pack years: 20.00   • Types: Cigarettes   • Quit date: 1996   • Years since quittin.1   • Passive exposure: Past   Smokeless Tobacco Never     Family History   Problem Relation Age of Onset   • Heart disease Mother    • Diabetes Father    • Thyroid cancer Sister    • Heart disease Brother    • Diabetes Brother    • Heart disease Brother    • Heart disease Brother    • Emphysema Maternal Grandmother    • Heart disease Family        Meds/Allergies     Medications Prior to Admission   Medication   • albuterol (PROVENTIL HFA,VENTOLIN HFA) 90 mcg/act inhaler   • ammonium lactate (LAC-HYDRIN) 12 % lotion   • apixaban (ELIQUIS) 5 mg   • B Complex-C-Folic Acid (triphrocaps) 1 MG CAPS   • doxycycline (ADOXA) 100 MG tablet   • ferrous sulfate 325 (65 Fe) mg tablet   • fluticasone (FLONASE) 50 mcg/act nasal spray   • Fluticasone-Salmeterol (Wixela Inhub) 250-50 mcg/dose inhaler   • glucose blood (OneTouch Verio) test strip   • insulin lispro (HumaLOG KwikPen) 100 units/mL injection pen   • Insulin Pen Needle (BD Pen Needle Pilar 2nd Gen) 32G X 4 MM MISC   • Insulin Pen Needle (NovoFine Autocover) 30G X 8 MM MISC   • Insulin Pen Needle 30G X 8 MM MISC   • Insulin Pen Needle 31G X 8 MM MISC   • Lancets (onetouch ultrasoft) lancets   • metoprolol tartrate (LOPRESSOR) 50 mg tablet   • montelukast (SINGULAIR) 10 mg tablet   • NovoFine Autocover Pen Needle 30G X 8 MM MISC   • nystatin (MYCOSTATIN) powder   • pantoprazole (PROTONIX) 40 mg tablet   • polyethylene glycol (MIRALAX) 17 g packet   • rosuvastatin (CRESTOR) 10 MG tablet   • senna (SENOKOT) 8.6 mg   • sodium chloride, PF, 0.9 %   • torsemide (DEMADEX) 10 mg tablet   • Toujeo SoloStar 300 units/mL CONCENTRATED U-300 injection pen (1-unit dial)     Current Facility-Administered Medications   Medication Dose Route Frequency   • cefepime (MAXIPIME) 2 g/50 mL dextrose IVPB  2,000 mg Intravenous Q12H   • chlorhexidine (PERIDEX) 0.12 % oral rinse 15 mL  15 mL Mouth/Throat Q12H [address]   • EPINEPHrine 5,000 mcg (STANDARD CONCENTRATION) IV in sodium chloride 0.9% 250 mL  1-20 mcg/min Intravenous Titrated   • fludrocortisone (FLORINEF) tablet 0.1 mg  0.1 mg Oral Daily   • hydrocortisone (Solu-CORTEF) injection 100 mg  100 mg Intravenous Q12H [address]   • hydrocortisone (Solu-CORTEF) injection 50 mg  50 mg Intravenous Once   • insulin lispro (HumaLOG) 100 units/mL subcutaneous injection 2-12 Units  2-12 Units Subcutaneous Q6H [address]   • ipratropium-albuterol (DUO-NEB) 0.5-2.5 mg/3 mL inhalation solution 3 mL  3 mL Nebulization Q6H PRN   • methylene blue (PROVAYBLUE) 150 mg in dextrose 5 % 50 mL IVPB  150 mg Intravenous Once   • metroNIDAZOLE (FLAGYL) IVPB (premix) 500 mg 100 mL  500 mg Intravenous Q8H   • micafungin (MYCAMINE) 100 mg in sodium chloride 0.9 % 100 mL IVPB  100 mg Intravenous Q24H   • norepinephrine (LEVOPHED) 8 mg (DOUBLE CONCENTRATION) IV in sodium chloride 0.9% 250 mL  1-30 mcg/min Intravenous Titrated   • NxStage K 4/Ca 3 dialysis solution (RFP-401) 20,000 mL  20,000 mL Dialysis Continuous   • nystatin (MYCOSTATIN) powder   Topical BID   • pantoprazole (PROTONIX) injection 40 mg  40 mg Intravenous Q24H SELMA   • phenylephrine (CELINA-SYNEPHRINE) 100 mg (DOUBLE CONCENTRATION) IV in sodium chloride 0.9% 250 mL   mcg/min Intravenous Titrated   • sodium bicarbonate 150 mEq in dextrose 5 % 1,000 mL infusion  200 mL/hr Intravenous Continuous   • vancomycin (VANCOCIN) IVPB (premix in dextrose) 750 mg 150 mL  10 mg/kg (Adjusted) Intravenous Q12H   • vasopressin (PITRESSIN) 20 Units in sodium chloride 0.9 % 100 mL infusion  0.04 Units/min Intravenous Continuous       Allergies   Allergen Reactions   • Penicillins Hives   • Moxifloxacin Other (See Comments)     unknown   • Oxycodone-Acetaminophen Other (See Comments)     GI upset   • Zinc Acetate Other (See Comments)     unknown   • Penicillins Hives   • Asa [Aspirin] GI Intolerance   • Indocin [Indomethacin] Other (See Comments)     Made patient "loopy"   • Other Other (See Comments)     unknown   • Tannic Acid Rash       Objective     Blood pressure (!) 65/47, pulse 98, temperature (!) 96.3 °F (35.7 °C), resp. rate (!) 28, height 5' 2" (1.575 m), weight 120 kg (264 lb), SpO2 92 %, not currently breastfeeding. Intake/Output Summary (Last 24 hours) at 8/29/2023 1021  Last data filed at 8/29/2023 0900  Gross per 24 hour   Intake 6054.25 ml   Output 317 ml   Net 5737.25 ml       PHYSICAL EXAM  General appearance: She is obtunded on the ventilator. Morbidly obese    Abdomen: Round and soft. No appreciable tenderness. Skin: Warm without any lower extremity mottling    Lab Results:   No results displayed because visit has over 200 results. Imaging Studies: I have personally reviewed pertinent films in PACS    ASSESSMENT: Septic shock. Unclear etiology. Recent history of right breast abscess which was drained percutaneously with subsequent drain removal.  Also reports right sided pneumonia. Presently in septic shock. Requiring multiple pressors. Acute renal failure on dialysis  Pro time greater than 120  Changes on her gallbladder likely secondary to hepatomegaly or potential heart failure with congestion of the liver    PLAN: No role for any surgical intervention at this point. Given high pro time, would be difficult to recommend any type of procedure or risk of bleeding. Discussed with critical care AP    Counseling / Coordination of Care  Total time spent today  30 minutes.  Greater than 50% of total time was spent with the patient and / or family counseling and / or coordination of care.

## 2023-08-29 NOTE — ED ATTENDING ATTESTATION
8/28/2023  Ginger Holguin DO, saw and evaluated the patient. I have discussed the patient with the resident/non-physician practitioner and agree with the resident's/non-physician practitioner's findings, Plan of Care, and MDM as documented in the resident's/non-physician practitioner's note, except where noted. All available labs and Radiology studies were reviewed. I was present for key portions of any procedure(s) performed by the resident/non-physician practitioner and I was immediately available to provide assistance. At this point I agree with the current assessment done in the Emergency Department. I have conducted an independent evaluation of this patient a history and physical is as follows:    ED Course    73 yo f presenting in resp distress. History of asthma, atrial fibrillation, diabetes mellitus, HLD, HTN, breast abscess presenting from nursing home with shortness of breath and speaking 1-2 word sentences. Immediately placed on the monitor. BiPAP initiated. Chest x-ray shows large left-sided pleural effusion which was drained emergently. Patient unfortunately continued to decompensate requiring intubation. Central line was placed. Patient found to be in renal failure. Dialysis catheter was emergently placed. Nephrology was consulted. Patient started on broad-spectrum antibiotics, pressors, admitted to ICU. Overall poor prognosis. Nephrology is planning emergent dialysis. Critical Care Time  Procedures    I have personally seen and examined the patient on above stated date. I discussed the patient with the Resident including, but not limited to, verifying findings; reviewing labs and x-rays; discussing with consultants; developing the plan of care with the bedside nurse; and discussing treatment plan with patient or surrogate.   I have reviewed the note and assessment performed by the Resident, and agree with the documented findings and plan of care with the following additions/exceptions. Please see my following comments for details and adjustments.  Critical care time, excluding procedures, teaching, family meetings, and excludes any prior time recorded by providers other than myself, (120) minutes       Lonne , DO

## 2023-08-29 NOTE — ASSESSMENT & PLAN NOTE
· Bedside US and CTA PE study concerning for moderate sized pericardial effusion w/ reflux of contrast into hepatic vessels, increased R heart pressure without tamponade physiology or signs of R heart strain likely secondary to fluid overload and renal failure   · Although not confirmed on CT, there is some clinical concern for possible tamponade given hypotension, tachycardia, hypoxia, clear lung sounds, normal IVC responsive to IVF's, minimally responsive to multiple pressors   · F/u echo final read  · Consult cardiology   · Consider cardiothoracic consult for possible pericardiocentesis   · Continue dialysis

## 2023-08-29 NOTE — PROGRESS NOTES
Vancomycin IV Pharmacy-to-Dose Consultation    Patient's level was rescheduled to a trough on 8/30 at 1300 before the 4th dose.     Xena Yip, FredisD  Pharmacist

## 2023-08-29 NOTE — PROGRESS NOTES
Corwin Macias is a 72 y.o. female who is currently ordered Vancomycin IV with management by the Pharmacy Consult service. Relevant clinical data and objective / subjective history reviewed. Vancomycin Assessment:  Indication and Goal AUC/Trough: Other, not sure what goal trough is; pt is on vancomycin, has CKD and superimposed SEBASTIAN, needs renal dose adjustment, -600, trough >10  Clinical Status:  critical care  Micro:   Pending      Renal Function:  SCr: 5.25 mg/dL  CrCl: 12.1 mL/min  Renal replacement: CVVH D  Days of Therapy: 1  Current Dose: 2000mg (25mg/kg) loading dose given  Vancomycin Plan:  New Dosinmg (10mg/kg) Q 12hrs  Estimated AUC: n/a for CVVHD  Estimated Trough: n/a for CVVHD  Next Level: random  @ 0600 to ensure level >10 but <20. Renal Function Monitoring: Daily BMP and [place] will continue to follow closely for s/sx of nephrotoxicity, infusion reactions and appropriateness of therapy. BMP and CBC will be ordered per protocol. We will continue to follow the patient’s culture results and clinical progress daily.     Brittaney Nolan, Pharmacist

## 2023-08-29 NOTE — DEATH NOTE
INPATIENT DEATH NOTE  Elinor Lou 72 y.o. female MRN: [id number]  Unit/Bed#: ICU 01 Encounter: [id number]    Date, Time and Cause of Death    Date of Death: 23  Time of Death: 11:21 AM  Preliminary Cause of Death: Septic shock ([address])  Entered by: Keith Mcgowan PA-C[MD1.1]     Attribution     MD1.1 Geovani Martinez PA-C 23 11:46           Patient's Information  Pronounced by: Keith Mcgowan PA-C  Did the patient's death occur in the ED?: No  Did the patient's death occur in the OR?: No  Did the patient's death occur less than 10 days post-op?: No  Did the patient's death occur within 24 hours of admission?: Yes  Was code status DNR at the time of death?: Yes    PHYSICAL EXAM:  Spontaneous respirations absent, Breath sounds absent, Carotid pulse absent and Heart sounds absent    Medical Examiner notification criteria:  Patient  within 24 hours of arrival to hospital   Medical Examiner's office notified?:  Yes  Medical Examiner accepted case?:  No  Name of Medical Examiner: Laury Garcia    Family Notification  Was the family notified?: Yes  Date Notified: 23  Time Notified: 3488  Notified by: Care team   Relationship to Patient: Other relative  Family Notification Route: At bedside  Was the family told to contact a  home?: Yes    Autopsy Options:  Post-mortem examination authorized by next of kin. Consent form completed.     Primary Service Attending Physician notified?:  yes - Attending: Shelley Barnett  Physician/Resident responsible for completing Discharge Summary:  Keith Mcgowan PA-C

## 2023-08-29 NOTE — ASSESSMENT & PLAN NOTE
Lab Results   Component Value Date    EGFR 5 08/28/2023    EGFR 23 08/22/2023    EGFR 19 08/21/2023    CREATININE 7.38 (H) 08/28/2023    CREATININE 2.07 (H) 08/22/2023    CREATININE 2.38 (H) 08/21/2023   · Baseline creatinine of 1.7.  · 8/28/2023 while in the ED, creatinine of 7.38, GFR: 5.  · Right femoral triple-lumen HD cath placed in the ED  · Nephro consulted and following, appreciate recommendations  · Start CRRT w/ plan to start 0K bath with close monitoring of BMP q 4 hrs and once serum potassium is <5, change to 4K bath.   · Strict I's and O's  · Maintain oneal catheter  · Follow-up CTA PE study with abdomen and pelvis read by radiology

## 2023-08-29 NOTE — RESPIRATORY THERAPY NOTE
RT Ventilator Management Note  Haley Rodriguez 72 y.o. female MRN: [id number]  Unit/Bed#: ICU 01 Encounter: [id number]      Daily Screen         8/29/2023  0742 8/29/2023  1115          Patient safety screen outcome[de-identified] Failed --      Not Ready for Weaning due to[de-identified] Hemodynamically unstable;Oxygenation saturation < 90%; FiO2 >60% --      Preparing to extubate/ Notify Nurse: -- Yes      Extubation order obtained: -- Yes      Patient extubated: -- Yes                    Resp Comments: Per pt family and physician order pt extubated to comfort care

## 2023-08-30 LAB
ATRIAL RATE: 101 BPM
BACTERIA UR CULT: NORMAL
P AXIS: 55 DEGREES
PR INTERVAL: 224 MS
QRS AXIS: -59 DEGREES
QRSD INTERVAL: 88 MS
QT INTERVAL: 336 MS
QTC INTERVAL: 435 MS
T WAVE AXIS: 73 DEGREES
VENTRICULAR RATE: 101 BPM

## 2023-08-30 PROCEDURE — 93010 ELECTROCARDIOGRAM REPORT: CPT | Performed by: INTERNAL MEDICINE

## 2023-09-01 LAB
BACTERIA BLD CULT: ABNORMAL
BACTERIA SPEC BFLD CULT: NO GROWTH
GRAM STN SPEC: ABNORMAL
GRAM STN SPEC: NORMAL
MECA+MECC ISLT/SPM QL: DETECTED
S EPIDERMIDIS DNA BLD POS QL NAA+NON-PRB: DETECTED

## 2023-09-02 LAB — BACTERIA BLD CULT: NORMAL

## 2024-02-24 LAB

## 2024-08-31 NOTE — ASSESSMENT & PLAN NOTE
Lab Results   Component Value Date    HGBA1C 8.1 (H) 03/25/2023       Recent Labs     08/11/23  1459   POCGLU 158*       Blood Sugar Average: Last 72 hrs:  (P) 158   Continue 25 units of long-acting insulin at bedtime, 10 units of Humalog 3 times daily with meals and insulin sliding scale  Check hemoglobin A1c in a.m. No

## 2024-10-17 NOTE — ASSESSMENT & PLAN NOTE
Heather Moss returns today for a postoperative check 1 month after having had a circumcision revision, simple scrotoplasty, and chordee release     His mother and father state(s) that he is doing well postoperatively.    He did well with pain control.     Review of Systems   Constitutional:  Negative for fever and irritability.   Genitourinary:  Negative for penile swelling and scrotal swelling.       Physical Exam  Constitutional:       General: He is active.      Appearance: He is well-developed.   HENT:      Head: Normocephalic and atraumatic.   Pulmonary:      Effort: Pulmonary effort is normal.   Genitourinary:     Comments: Penis looks great- straight and healing well,  incision lines intact, scrotum healed well  Musculoskeletal:         General: Normal range of motion.   Skin:     General: Skin is warm and dry.   Neurological:      Mental Status: He is alert.         Plan:  Can resume activities  Call if any concerns arise in interim  Follow up as needed         Wt Readings from Last 3 Encounters:   07/06/22 113 kg (249 lb)   06/17/22 111 kg (245 lb)   06/14/22 113 kg (249 lb)     CHF compensated  Infect her resting pulse ox was 95 person walking from waiting room to examining room it was 94-95%  Heart rate resting was 72 went up to 90  Infect she was able to walk from car to a our office without assistive device and without wheelchair       Will continue same regimen including torsemide 20 mg twice a day, metoprolol succinate now 50 mg once a day,

## 2025-05-11 NOTE — ASSESSMENT & PLAN NOTE
Monitor Summary  Rhythm: SB/SR:  Rate: 56-66  Ectopy: R)PVC  Measurements: 16/07/37  ---12 hr Chart Review---           · Creatinine gradually trending up this admission, 1 89 today  · Baseline Cr 1 4-1 6 with multiple episodes of SEBASTIAN  · Likely secondary to CRS in setting of CHF exacerbation complicated with contrast induced nephropathy  · Had CT contrast 3/9  · Nephrology consulted:  · Plan for renal US  · Avoid nephrotoxins  · Caution with diuresis  · Monitor BMP

## 2025-05-17 NOTE — H&P
10/21/19 0711   Patient Assessment/Suction   Level of Consciousness (AVPU) alert   Respiratory Effort Unlabored   All Lung Fields Breath Sounds coarse   Cough Type nonproductive   PRE-TX-O2   O2 Device (Oxygen Therapy) nasal cannula   $ Is the patient on Low Flow Oxygen? Yes   Flow (L/min) 2   SpO2 98 %   Pulse Oximetry Type Intermittent   $ Pulse Oximetry - Multiple Charge Pulse Oximetry - Multiple   Pulse 79   Resp 16   Inhaler   $ Inhaler Charges MDI (Metered Dose Inahler) Treatment   Daily Review of Necessity (Inhaler) completed   Respiratory Treatment Status (Inhaler) given;mouth rinsed post treatment   Treatment Route (Inhaler) mouthpiece   Patient Position (Inhaler) HOB not elevated due to hypotension   Signs of Intolerance (Inhaler) none      [address] [facility]  H&P Note  Name: Poncho Davidson  MRN: [id number]  Unit/Bed#: ICU 01 I Date of Admission: 8/28/2023   Date of Service: 8/29/2023 I Hospital Day: 1    Assessment/Plan   * Septic shock [facility])  Assessment & Plan  · Recent admission to [facility] from 8/11-8/22/23 due to septic shock from RLL pneumonia and a right breast abscess status post IR drainage on 8/18. MRSA (+) and blood cultures on 8/11/23 and 8/16 (-). Body fluid culture from the breast (-)  · Patient was given IV ceftriaxone, broadened to cefepime and vancomycin while admitted. · Discharged on oral doxycycline for total of 14 days of antibiotics. · 8/28: While in the ED, patient meeting septic shock criteria based on leukocytosis, hypotension, tachycardia, new SEBASTIAN, requiring levophed and vasopressin. Started on Cefepime  · While in ICU, pressor requirements have been steadily increasing with addition of epi  · S/p thoracentesis in the ED, pending pleural fluid studies will need follow-up  · Follow-up UA  · Follow-up blood cultures  · Continue cefepime, vancomycin, flagyl   · Monitor R breast abscess      Chronic respiratory failure with hypoxia and hypercapnia (HCC)  Assessment & Plan  · Hx of KATRINA and obesity hypoventilation syndrome; pt wears oxygen and CPAP qHS at home. No oxygen requirements during the day  · 8/28/2023: Intubated in the ED for airway protection due to vomiting while on BiPAP  · Continue AC/VC, maintain oxygen saturation >/= 92%.   · Keep head of bed elevated to prevent hypoxia from obesity hypoventilation syndrome   · Ventilator management protocol  · Wean as tolerated  · NPO while intubated    Acute kidney injury superimposed on chronic kidney disease [facility])  Assessment & Plan  Lab Results   Component Value Date    EGFR 5 08/28/2023    EGFR 23 08/22/2023    EGFR 19 08/21/2023    CREATININE 7.38 (H) 08/28/2023    CREATININE 2.07 (H) 08/22/2023    CREATININE 2.38 (H) 08/21/2023   · Baseline creatinine of 1.7.  · 8/28/2023 while in the ED, creatinine of 7.38, GFR: 5.  · Right femoral triple-lumen HD cath placed in the ED  · Nephro consulted and following, appreciate recommendations  · Start CRRT w/ plan to start 0K bath with close monitoring of BMP q 4 hrs and once serum potassium is <5, change to 4K bath. · Strict I's and O's  · Maintain oneal catheter  · Follow-up CTA PE study with abdomen and pelvis read by radiology    Hyperkalemia  Assessment & Plan  · Initial potassium in the ED of 7.2 s/p  calcium gluconate, albuterol, insulin, D5, Lasix given in the ED. · Start CRRT  · Nephro consulted and following  · Continue to monitor electrolytes  · Continue monitor ECG for cardiac changes    Metabolic acidosis  Assessment & Plan  · Most likely in setting of lactic acidosis in addition to renal failure  · Continue mechanical ventilation with goal pH of >=7.2  · Given multiple doses of bicarb   · Nephro consulted and following    Cardiac arrest [facility])  Assessment & Plan  · 8/29 around midnight: Code blue called for bradycardia followed by cardiac arrest most likely in setting of hyperkalemia from renal failure.  Cannot rule out possibility of cardiac arrest from possible tamponade  · ROSC received with 2 rounds of epi, multiple doses of bicarb, hyperkalemia temporizing therapy  · F/u echo, cardio consult    Paroxysmal atrial fibrillation (HCC)  Assessment & Plan  · History of A-fib, anticoagulated with Eliquis, rate control with metoprolol 50 mg bid  · Will contunue eliquis while admitted  · Will hold home Metoprolol for now in setting of hypotension  · While in the ED, patient was noted to be on A-fib requiring an amiodarone bolus followed by infusion, patient converted to normal sinus rhythm  · Continue amiodarone infusion  · Cardiology consult  · Follow-up echocardiogram    Pericardial effusion  Assessment & Plan  · Bedside US and CTA PE study concerning for moderate sized pericardial effusion w/ reflux of contrast into hepatic vessels, increased R heart pressure without tamponade physiology or signs of R heart strain likely secondary to fluid overload and renal failure   · Although not confirmed on CT, there is some clinical concern for possible tamponade given hypotension, tachycardia, hypoxia, clear lung sounds, normal IVC responsive to IVF's, minimally responsive to multiple pressors   · F/u echo final read  · Consult cardiology   · Consider cardiothoracic consult for possible pericardiocentesis   · Continue dialysis     Diabetes mellitus, type 2 [facility])  Assessment & Plan  Lab Results   Component Value Date    HGBA1C 9.2 (H) 08/18/2023       Recent Labs     08/28/23  1514   POCGLU 135       Blood Sugar Average: Last 72 hrs:  (P) 135   Sliding-scale insulin while admitted    Moderate persistent asthma without complication  Assessment & Plan  · Continue home Breo Ellipta and Singulair  · Outpatient follow-up    Ductal carcinoma in situ (DCIS) of right breast  Assessment & Plan  · Status post lumpectomy on 5/30/2023  · Outpatient oncology f/u    Type 2 diabetes mellitus with hyperglycemia, without long-term current use of insulin ([facility])  Assessment & Plan  Lab Results   Component Value Date    HGBA1C 9.2 (H) 08/18/2023       Recent Labs     08/28/23  1514   POCGLU 135       Blood Sugar Average: Last 72 hrs:  (P) 135     · Sliding-scale insulin while admitted    Breast abscess of female  Assessment & Plan  · Status post IR drainage 8/18/2023  · Fluid cultures negative from breast from last admission at [place]  · Flagyl started to cover anaerobes; can discontinue if cultures (-)  · Wound care as needed  · Monitor for any drainage           ICU Core Measures     Vented Patient  VAP Bundle  VAP bundle ordered     A: Assess, Prevent, and Manage Pain · Has pain been assessed? Yes  · Need for changes to pain regimen?  No   B: Both Spontaneous Awakening Trials (SATs) and Spontaneous Breathing Trials (SBTs) · Plan to perform spontaneous awakening trial today? No secondary to targeted temperature management  · Plan to perform spontaneous breathing trial today? No secondary to targeted temperature management  · Obvious barriers to extubation? No   C: Choice of Sedation · RASS Goal: -2 Light Sedation or 0 Alert and Calm  · Need for changes to sedation or analgesia regimen? No   D: Delirium · CAM-ICU: Negative   E: Early Mobility  · Plan for early mobility? Yes   F: Family Engagement · Plan for family engagement today? Yes     Antibiotic Review: Awaiting culture results. and Continue broad spectrum secondary to severity of illness. Review of Invasive Devices: Gomez Plan: Continue for accurate I/O monitoring for 48 hours  Central access plan: Medications requiring central line Hemodynamic monitoring, R femoral HD cath  Amarilis Plan: Keep arterial line for hemodynamic monitoring, frequent ABGs and frequent labs    Prophylaxis:  VTE VTE covered by:  apixaban, Oral, 5 mg at 08/28/23 2311       Stress Ulcer  covered bypantoprazole (PROTONIX) EC tablet 40 mg [[id number]]          Subjective      HPI: 82-year-old female with asthma, A-fib on Eliquis and metoprolol, IDDM2, recent admission at [place] from 8/11-8/22 due to septic shock from pneumonia. [facility] [place] due to SOB, oxygen saturation of 85% on room air, decreased oral intake for the past 2-3 days with vomiting/dry heaving. In ED, concern for septic shock from PNA; pt was intubated for airway protection, hypotensive requiring pressors, started on cefepime, thoracentesis performed, HD line placed, hyperkalemia treatment given, started on amio drip for a fib    HPI/24hr events: Cardiac arrest overnight with ROSC after 2 rounds of epi. Dr. Delfina Shaw attending was called to perform possible pericardiocentesis, however did not complete at that time as ROSC was achieved by time of his arrival to the ICU room. Pending final echo read. CRRT started. Increasing vasopressor requirements. BP did respond nicely to 500cc isolyte and 50g albumin bolus. Requiring bear hugger for hypothermia. Updated family prior to and after cardiac arrest, family would like pt to remain level 1 full-code. Review of Systems   Unable to perform ROS: Intubated          Objective                            Vitals I/O      Most Recent Min/Max in 24hrs   Temp (!) 96.3 °F (35.7 °C) Temp  Min: 91.9 °F (33.3 °C)  Max: 98.4 °F (36.9 °C)   Pulse 93 Pulse  Min: 0  Max: 192   Resp 14 Resp  Min: 14  Max: 20   BP (!) 173/79 BP  Min: 51/35  Max: 173/79   O2 Sat 94 % SpO2  Min: 48 %  Max: 100 %      Intake/Output Summary (Last 24 hours) at 2023 0336  Last data filed at 2023 1921  Gross per 24 hour   Intake 3100 ml   Output --   Net 3100 ml         Diet NPO     Invasive Monitoring Physical exam   Arterial Line  Amarilis BP    No data recorded   MAP    No data recorded    Physical Exam  Eyes:      Comments: Pupils nonreactive   Skin:     General: Skin is cool. Capillary Refill: Capillary refill takes 2 to 3 seconds. HENT:      Head: Normocephalic and atraumatic. Mouth/Throat:      Mouth: Mucous membranes are dry. Neck:     Vascular: Central line present. Cardiovascular:      Rate and Rhythm: Tachycardia present. Rhythm irregularly irregular. Heart sounds: Normal heart sounds, S1 normal and S2 normal.      No S3 or S4 sounds. Musculoskeletal:      Right lower le+ Pitting Edema present. Left lower le+ Pitting Edema present. Constitutional:       Appearance: She is obese. She is ill-appearing and toxic-appearing. Interventions: She is sedated, intubated and restrained. Comments: Intubated, OGT in place. CVC in R IJ, L radial A-line, R femoral triple lumen HD catheter, oneal catheter    Pulmonary:      Effort: She is intubated. Breath sounds: Decreased breath sounds present.       Comments: Decreased breath sounds bilaterally most likely due to body habitus  Chest:          Comments: R breast abscess in area circled above; no purulence or surrounding erythema  Neurological:      Comments: No cough reflex, no gag reflex, pupils nonreactive to light    Genitourinary/Anorectal:     Comments: Gomez in place. Intertrigo of bilateral inguinal folds   Exam conducted with a chaperone present. Diagnostic Studies    EKG: ECG in ED: a fib,   Imaging:  I have personally reviewed pertinent reports.        CTA PE STUDY & ABDOMEN AND PELVIS (read via vRAD, report below):                 Medications:  Scheduled PRN   apixaban, 5 mg, BID  atorvastatin, 40 mg, Daily With Dinner  cefepime, 2,000 mg, Q12H  chlorhexidine, 15 mL, Q12H SELMA  fluticasone, 1 spray, Daily  hydrocortisone sodium succinate, 50 mg, Q6H SELMA  insulin lispro, 2-12 Units, Q6H SELMA  metroNIDAZOLE, 500 mg, Q8H  montelukast, 10 mg, HS  nystatin, , BID  pantoprazole, 40 mg, Early Morning  polyethylene glycol, 17 g, Daily  senna, 17.2 mg, HS  vancomycin, 10 mg/kg (Adjusted), Q12H      albuterol, 2 puff, Q6H PRN  bisacodyl, 10 mg, Daily PRN       Continuous    amiodarone (CORDARONE) 900 mg in dextrose 5 % 500 mL infusion, 0.5 mg/min, Last Rate: 0.5 mg/min (08/29/23 0125)  epinephrine, 1-20 mcg/min, Last Rate: 20 mcg/min (08/29/23 0153)  norepinephrine, 1-30 mcg/min, Last Rate: 30 mcg/min (08/29/23 0113)  NxStage K 0/Ca 3, 20,000 mL  propofol, 5-50 mcg/kg/min, Last Rate: 10 mcg/kg/min (08/28/23 1947)  vasopressin, 0.04 Units/min, Last Rate: 0.04 Units/min (08/28/23 2218)         Labs:    CBC    Recent Labs     08/28/23  1613 08/28/23 2219 08/29/23  0034   WBC 17.85* 17.14*  --    HGB 9.5* 8.9* 8.8*   HCT 31.2* 30.3* 26*   * 586*  --    BANDSPCT  --  4  --      BMP    Recent Labs     08/28/23  1613 08/28/23  2219 08/29/23  0034   SODIUM 135 132*  --    K 7.4* 7.4*  --    CL 98 100  --    CO2 21 13* 16*   AGAP 16 19  --    BUN 91* 84*  --    CREATININE 7.38* 6.76*  --    CALCIUM 9.0 8.3*  -- Coags    Recent Labs     08/28/23  1749   INR 3.37*   PTT 46*        Additional Electrolytes  Recent Labs     08/28/23  2219 08/29/23  0034 08/29/23  0304   MG 2.3  --   --    PHOS 10.8*  --   --    CAIONIZED 1.10* 1.24 1.09*          Blood Gas    No recent results  Recent Labs     08/28/23  2219   PHVEN 6.972*   HVG9DPC 51.4*   PO2VEN 43.7   VCI2MLK 11.6*   BEVEN -19.4    LFTs  Recent Labs     08/28/23  1613   ALT 64*   AST 75*   ALKPHOS 142*   ALB 3.6   TBILI 0.60       Infectious  No recent results  Glucose  Recent Labs     08/28/23  1613 08/28/23 2219   GLUC 109 124               Critical Care Time Delivered: Upon my evaluation, this patient had a high probability of imminent or life-threatening deterioration due to cardiac arrest, hyperkalemia, renal failure requiring dialysis, which required my direct attention, intervention, and personal management. I have personally provided 180 minutes of critical care time, exclusive of procedures, teaching, family meetings, and any prior time recorded by providers other than myself.      Matthew Christianson MD [facility]

## 2025-05-28 NOTE — ASSESSMENT & PLAN NOTE
Lab Results   Component Value Date    EGFR 34 02/16/2022    EGFR 45 01/01/2020    EGFR 52 10/18/2019    CREATININE 1 52 (H) 02/16/2022    CREATININE 1 24 01/01/2020    CREATININE 1 10 10/18/2019   Baseline Cr 1 2-1 4   Cr slightly elevated at 1 5  Hold demadex  Trend [NL] : moves all extremities x4, warm, well perfused x4 [de-identified] : b/l cheeks look red, not war to touch.  No rash elsewhere on the body

## 2025-06-13 NOTE — OCCUPATIONAL THERAPY NOTE
06/13/25 1030   Pain Assessment   Pain Assessment Tool 0-10   Pain Score No Pain   Restrictions/Precautions   Precautions Cognitive;Fall Risk   Weight Bearing Restrictions No   ROM Restrictions No   Braces or Orthoses Other (Comment)  (ace wrap to  lower legs)   Cognition   Overall Cognitive Status Impaired   Arousal/Participation Alert;Cooperative   Attention Attends with cues to redirect   Orientation Level Oriented X4   Memory Decreased short term memory;Decreased recall of precautions   Following Commands Follows one step commands without difficulty   Sit to Stand   Type of Assistance Needed Independent;Adaptive equipment   Comment with RW   Sit to Stand CARE Score 6   Bed-Chair Transfer   Type of Assistance Needed Independent;Adaptive equipment   Comment with RW   Chair/Bed-to-Chair Transfer CARE Score 6   Transfer Bed/Chair/Wheelchair   Adaptive Equipment Roller Walker   Walk 10 Feet   Type of Assistance Needed Independent;Adaptive equipment   Comment with RW   Walk 10 Feet CARE Score 6   Walk 50 Feet with Two Turns   Type of Assistance Needed Independent;Adaptive equipment   Comment with RW   Walk 50 Feet with Two Turns CARE Score 6   Walk 150 Feet   Type of Assistance Needed Set-up / clean-up;Adaptive equipment   Comment DS with RW   Walk 150 Feet CARE Score 5   Walking 10 Feet on Uneven Surfaces   Type of Assistance Needed Supervision;Adaptive equipment   Comment CS with RW over outside ramp. Pt also amb on carpet in lobby.   Walking 10 Feet on Uneven Surfaces CARE Score 4   Ambulation   Primary Mode of Locomotion Prior to Admission Walk   Distance Walked (feet) 250 ft  (x2, 70', 150')   Assist Device Roller Walker   Gait Pattern Inconsistant Rose;Slow Rose;Decreased foot clearance;Forward Flexion;Shuffle;Improper weight shift   Limitations Noted In Balance;Coordination;Endurance;Heel Strike;Posture;Speed;Strength;Swing   Provided Assistance with: Direction   Walk Assist Level Modified  OT TREATMENT       03/16/22 1515   Note Type   Note Type Treatment   Restrictions/Precautions   LUE Weight Bearing Per Order NWB   Braces or Orthoses Sling   Other Precautions Pain;O2;Fall Risk   Pain Assessment   Pain Assessment Tool 0-10   Pain Score 10 - Worst Possible Pain   Pain Location/Orientation Orientation: Left; Location: Shoulder   ADL   Grooming Assistance 5  Supervision/Setup   Grooming Deficit Brushing hair   Grooming Comments seated on edge of bed    UB Dressing Comments Sling on L shoulder adjusted to comfort and positioning  Patient educated on positioning and how to assist for comfort with pillows while supine and seated in chair  Patient verbalized understanding  Bed Mobility   Supine to Sit 4  Minimal assistance   Sit to Supine 4  Minimal assistance   Transfers   Sit to Stand 4  Minimal assistance   Stand to Sit 4  Minimal assistance   Functional Mobility   Functional Mobility 4  Minimal assistance   Additional Comments 15 feet with hand hold assist   Assessment   Assessment Patient seen for OT treatment  Patient is generally weak and deconditioned  Patient walked in hallway  Patient is fatigued with activity  Patient required min assist for bed mobility and transfers today  Patients sling adjusted for better fit  Patient will benefit from continued OT services to maximize functional performance with ADLS  The patient's raw score on the AM-PAC Daily Activity inpatient short form is 17, standardized score is 37 26, less than 39 4  Patients at this level are likely to benefit from DC to post-acute rehabilitation services, however pt plans to return home with carter who has been trained in [facility]  Please refer to the recommendation of the Occupational Therapist for safe DC planning  Plan   Treatment Interventions ADL retraining;Functional transfer training;UE strengthening/ROM; Endurance training;Patient/family training;Equipment evaluation/education; Activityengagement; Compensatory Independent;Distant Supervision   Does the patient walk? 2. Yes   Curb or Single Stair   Style negotiated Curb   Type of Assistance Needed Set-up / clean-up   Comment on outside curb step   1 Step (Curb) CARE Score 5   Stairs   Type Curb   # of Steps 1   Weight Bearing Precautions Fall Risk   Assist Devices Roller Walker   Picking Up Object   Type of Assistance Needed Independent;Adaptive equipment   Comment RW and reacher   Picking Up Object CARE Score 6   Toilet Transfer   Type of Assistance Needed Independent;Adaptive equipment   Comment RW   Toilet Transfer CARE Score 6   Toilet Transfer   Surface Assessed Standard Toilet   Equipment Use   NuStep L2 x15 min BLE/UE   Assessment   Treatment Assessment Pt participated in skilled PT session with increased focus on amb outside on uneven surfaces. Pt remained CS on sidewalk with RW. Pt is now I with RW in room but states he will still call for help if he needs. Pt was educated on not amb alone if he feels off and he still requires assist at night. Pt showed understanding. Pt will cont POC as tolerated with planned discharge home 6/16 with recommended OPPT.   Problem List Decreased strength;Decreased endurance;Impaired balance;Decreased mobility;Decreased coordination;Decreased cognition;Impaired judgement;Decreased safety awareness   Barriers to Discharge Inaccessible home environment;Decreased caregiver support   PT Barriers   Functional Limitation Car transfers;Stair negotiation;Standing;Transfers;Walking   Discharge Recommendation   Equipment Recommended Walker   PT Therapy Minutes   PT Time In 1030   PT Time Out 1130   PT Total Time (minutes) 60   PT Mode of treatment - Individual (minutes) 60   PT Mode of treatment - Concurrent (minutes) 0   PT Mode of treatment - Group (minutes) 0   PT Mode of treatment - Co-treat (minutes) 0   PT Mode of Treatment - Total time(minutes) 60 minutes   PT Cumulative Minutes 690   Therapy Time missed   Time missed? No      technique education   OT Frequency 3-5x/wk   Recommendation   OT Discharge Recommendation Home with home health rehabilitation   AM-PAC Daily Activity Inpatient   Lower Body Dressing 2   Bathing 2   Toileting 3   Upper Body Dressing 2   Grooming 4   Eating 4   Daily Activity Raw Score 17   Daily Activity Standardized Score (Calc for Raw Score >=11) 37 26   AM-PAC Applied Cognition Inpatient   Following a Speech/Presentation 4   Understanding Ordinary Conversation 4   Taking Medications 4   Remembering Where Things Are Placed or Put Away 4   Remembering List of 4-5 Errands 4   Taking Care of Complicated Tasks 4   Applied Cognition Raw Score 24   Applied Cognition Standardized Score 66 29   Licensure   [place] License Number  Edna Sainz [facility] [place] [address] OTR/L [id number]

## (undated) DEVICE — SUT VICRYL 3-0 SH 27 IN J416H

## (undated) DEVICE — CHLORAPREP HI-LITE 26ML ORANGE

## (undated) DEVICE — GLOVE SRG BIOGEL ORTHOPEDIC 7.5

## (undated) DEVICE — POV-IOD SOLUTION 4OZ BT

## (undated) DEVICE — POSITIONER TRIMANO LIMB BEACH CHAIR

## (undated) DEVICE — PACK UNIVERSAL DRAPES SUB-Q ICD

## (undated) DEVICE — DRAPE EQUIPMENT RF WAND

## (undated) DEVICE — SUT VICRYL 2-0 SH 27 IN UNDYED J417H

## (undated) DEVICE — MAYO STAND COVER: Brand: CONVERTORS

## (undated) DEVICE — HANDPIECE SET WITH HIGH FLOW TIP AND SUCTION TUBE: Brand: INTERPULSE

## (undated) DEVICE — 3M™ COBAN™ NL STERILE NON-LATEX SELF-ADHERENT WRAP, 2084S, 4 IN X 5 YD (10 CM X 4,5 M), 18 ROLLS/CASE: Brand: 3M™ COBAN™

## (undated) DEVICE — DRAPE SHEET THREE QUARTER

## (undated) DEVICE — ANTIBACTERIAL UNDYED BRAIDED (POLYGLACTIN 910), SYNTHETIC ABSORBABLE SUTURE: Brand: COATED VICRYL

## (undated) DEVICE — TONGUE DEPRESSOR STERILE

## (undated) DEVICE — GLOVE SRG BIOGEL 8

## (undated) DEVICE — REPEL LITE CUT REST SURGICAL GLV LNRS X-LG: Brand: REPEL

## (undated) DEVICE — SYRINGE,TOOMEY,IRRIGATION,70CC,STERILE: Brand: MEDLINE

## (undated) DEVICE — TUBING SUCTION 5MM X 12 FT

## (undated) DEVICE — ADHESIVE SKIN CLOSR DERMABOND PRINEO

## (undated) DEVICE — ORTHOPEDIC PACK: Brand: CARDINAL HEALTH

## (undated) DEVICE — SUT SILK 2-0 SH 30 IN K833H

## (undated) DEVICE — GLOVE INDICATOR PI UNDERGLOVE SZ 7.5 BLUE

## (undated) DEVICE — SUT ETHIBOND 5 V-37 30 IN MB66G

## (undated) DEVICE — CAPIT SHOULDER REV DELTA XTEND

## (undated) DEVICE — HOOD: Brand: FLYTE, SURGICOOL

## (undated) DEVICE — INTENDED FOR TISSUE SEPARATION, AND OTHER PROCEDURES THAT REQUIRE A SHARP SURGICAL BLADE TO PUNCTURE OR CUT.: Brand: BARD-PARKER SAFETY BLADES SIZE 15, STERILE

## (undated) DEVICE — BETHLEHEM UNIVERSAL MINOR GEN: Brand: CARDINAL HEALTH

## (undated) DEVICE — PLUMEPEN PRO 10FT

## (undated) DEVICE — HEMOSTAT POWDER ADSORB SURGICEL 3GM

## (undated) DEVICE — TIBURON SPLIT SHEET: Brand: CONVERTORS

## (undated) DEVICE — ASTOUND STANDARD SURGICAL GOWN, XL: Brand: CONVERTORS

## (undated) DEVICE — BASIC DOUBLE BASIN 2-LF: Brand: MEDLINE INDUSTRIES, INC.

## (undated) DEVICE — ADHESIVE SKIN HIGH VISCOSITY EXOFIN 1ML

## (undated) DEVICE — ELECTRODE EZ CLEAN BLADE -0012

## (undated) DEVICE — 4-PORT MANIFOLD: Brand: NEPTUNE 2

## (undated) DEVICE — LIGHT HANDLE COVER SLEEVE DISP BLUE STELLAR

## (undated) DEVICE — SUT MONOCRYL 3-0 PS-2 18 IN Y497G

## (undated) DEVICE — DRESSING MEPILEX AG BORDER 4 X 10 IN

## (undated) DEVICE — DUAL CUT SAGITTAL BLADE

## (undated) DEVICE — SHEATH, GUIDE, SAVI SCOUT®: Brand: SAVI SCOUT®

## (undated) DEVICE — DRESSING MEPILEX AG BORDER 4 X 8 IN

## (undated) DEVICE — SUT VICRYL 4-0 PS-2 18 IN J496G

## (undated) DEVICE — ANTIBACTERIAL VIOLET BRAIDED (POLYGLACTIN 910), SYNTHETIC ABSORBABLE SUTURE: Brand: COATED VICRYL